# Patient Record
Sex: FEMALE | Race: WHITE | NOT HISPANIC OR LATINO | Employment: OTHER | URBAN - METROPOLITAN AREA
[De-identification: names, ages, dates, MRNs, and addresses within clinical notes are randomized per-mention and may not be internally consistent; named-entity substitution may affect disease eponyms.]

---

## 2017-01-25 ENCOUNTER — TRANSCRIBE ORDERS (OUTPATIENT)
Dept: ADMINISTRATIVE | Facility: HOSPITAL | Age: 82
End: 2017-01-25

## 2017-01-25 ENCOUNTER — ALLSCRIPTS OFFICE VISIT (OUTPATIENT)
Dept: OTHER | Facility: OTHER | Age: 82
End: 2017-01-25

## 2017-01-25 DIAGNOSIS — C50.311 MALIGNANT NEOPLASM OF LOWER-INNER QUADRANT OF RIGHT FEMALE BREAST (HCC): Primary | ICD-10-CM

## 2017-02-20 ENCOUNTER — GENERIC CONVERSION - ENCOUNTER (OUTPATIENT)
Dept: OTHER | Facility: OTHER | Age: 82
End: 2017-02-20

## 2017-07-11 ENCOUNTER — ALLSCRIPTS OFFICE VISIT (OUTPATIENT)
Dept: OTHER | Facility: OTHER | Age: 82
End: 2017-07-11

## 2017-08-15 ENCOUNTER — HOSPITAL ENCOUNTER (OUTPATIENT)
Dept: MAMMOGRAPHY | Facility: CLINIC | Age: 82
Discharge: HOME | End: 2017-08-15
Payer: COMMERCIAL

## 2017-08-15 DIAGNOSIS — C50.311 MALIGNANT NEOPLASM OF LOWER-INNER QUADRANT OF RIGHT FEMALE BREAST (HCC): ICD-10-CM

## 2017-08-15 PROCEDURE — G0279 TOMOSYNTHESIS, MAMMO: HCPCS

## 2017-08-15 PROCEDURE — G0204 DX MAMMO INCL CAD BI: HCPCS

## 2017-08-23 ENCOUNTER — ALLSCRIPTS OFFICE VISIT (OUTPATIENT)
Dept: OTHER | Facility: OTHER | Age: 82
End: 2017-08-23

## 2018-01-11 NOTE — RESULT NOTES
Verified Results  (1) COMPREHENSIVE METABOLIC PANEL [id number] 13:38KF Me[facility] Quant    Order Number: [id number]    TW Order Number: [id number] Order Number: [id number] Order Number: [id number]  National Kidney Disease Education Program recommendations are as follows:  GFR calculation is accurate only with a steady state creatinine  Chronic Kidney disease less than 60 ml/min/1 73 sq  meters  Kidney failure less than 15 ml/min/1 73 sq  meters  Test Name Result Flag Reference   GLUCOSE,RANDM 88 mg/dL     If the patient is fasting, the ADA then defines impaired fasting glucose as > 100 mg/dL and diabetes as > or equal to 123 mg/dL  SODIUM 138 mmol/L  136-145   POTASSIUM 4 2 mmol/L  3 5-5 3   CHLORIDE 102 mmol/L  100-108   CARBON DIOXIDE 28 mmol/L  21-32   ANION GAP (CALC) 8 mmol/L  4-13   BLOOD UREA NITROGEN 33 mg/dL H 5-25   CREATININE 1 26 mg/dL  0 60-1 30   Standardized to IDMS reference method   CALCIUM 9 1 mg/dL  8 3-10 1   BILI, TOTAL 0 47 mg/dL  0 20-1 00   ALK PHOSPHATAS 52 U/L     ALT (SGPT) 25 U/L  12-78   AST(SGOT) 20 U/L  5-45   ALBUMIN 3 9 g/dL  3 5-5 0   TOTAL PROTEIN 7 2 g/dL  6 4-8 2   eGFR Non-African American 40 8 ml/min/1 73sq m       (1) CBC/PLT/DIFF 22Apr2016 10:47AM [facility]   TW Order Number: [id number]    TW Order Number: [id number]     Test Name Result Flag Reference   WBC COUNT 5 18 Thousand/uL  4 31-10 16   RBC COUNT 3 58 Million/uL L 3 81-5 12   HEMOGLOBIN 11 5 g/dL  11 5-15 4   HEMATOCRIT 35 5 %  34 8-46  1   MCV 99 fL H 82-98   MCH 32 1 pg  26 8-34 3   MCHC 32 4 g/dL  31 4-37 4   RDW 14 6 %  11 6-15 1   MPV 9 4 fL  8 9-12 7   PLATELET COUNT 275 Thousands/uL  149-390   nRBC AUTOMATED 0 /100 WBCs     NEUTROPHILS RELATIVE PERCENT 57 %  43-75   LYMPHOCYTES RELATIVE PERCENT 30 %  14-44   MONOCYTES RELATIVE PERCENT 7 %  4-12   EOSINOPHILS RELATIVE PERCENT 5 %  0-6   BASOPHILS RELATIVE PERCENT 1 %  0-1   NEUTROPHILS ABSOLUTE COUNT 2 97 Thousands/µL  1 85-7 62 LYMPHOCYTES ABSOLUTE COUNT 1 54 Thousands/µL  0 60-4 47   MONOCYTES ABSOLUTE COUNT 0 35 Thousand/µL  0 17-1 22   EOSINOPHILS ABSOLUTE COUNT 0 24 Thousand/µL  0 00-0 61   BASOPHILS ABSOLUTE COUNT 0 07 Thousands/µL  0 00-0 10     (1) LIPID PANEL, FASTING 22Apr2016 10:47AM [facility] Order Number: [id number]     Order Number: [id number] Order Number: [id number] Order Number: [id number]  Triglyceride:         Normal              <150 mg/dl       Borderline High    150-199 mg/dl       High               200-499 mg/dl       Very High          >499 mg/dl  Cholesterol:         Desirable        <200 mg/dl      Borderline High  200-239 mg/dl      High             >239 mg/dl  HDL Cholesterol:        High    >59 mg/dL      Low     <41 mg/dL  LDL CALCULATED:    This screening LDL is a calculated result  It does not have the accuracy of the Direct Measured LDL in the monitoring of patients with hyperlipidemia and/or statin therapy  Direct Measure LDL (PQA518) must be ordered separately in these patients  Test Name Result Flag Reference   CHOLESTEROL 201 mg/dL H    HDL,DIRECT 54 mg/dL  40-60   Specimen collection should occur prior to Metamizole administration due to the potential for falsely depressed results  LDL CHOLESTEROL CALCULATED 99 mg/dL  0-100   TRIGLYCERIDES 239 mg/dL H <=150   Specimen collection should occur prior to N-Acetylcysteine or Metamizole administration due to the potential for falsely depressed results       (1) HEMOGLOBIN A1C 22Apr2016 10:47AM [facility] Order Number: [id number]      5 7-6 4% impaired fasting glucose  >=6 5% diagnosis of diabetes    Falsely low levels are seen in conditions linked to short RBC life span-  hemolytic anemia, and splenomegaly  Falsely elevated levels are seen in situations where there is an increased production of RBC- receipt of erythropoietin or blood transfusions  Adopted from ADA-Clinical Practice Recommendations     Test Name Result Flag Reference   HEMOGLOBIN A1C 5 6 %  4 0-5 6   EST  AVG  GLUCOSE 114 mg/dl       (1) TSH WITH FT4 REFLEX 22Apr2016 10:47AM Ashanti Aguirre    Order Number: [id number]    TW Order Number: [id number]     Test Name Result Flag Reference   TSH 7 200 uIU/mL H 0 358-3 740   T4,FREE 1 14 ng/dL  0 76-1 46       Plan  Hypothyroidism    · Levothyroxine Sodium 75 MCG Oral Tablet; Take 1 tablet daily   · (1) TSH WITH FT4 REFLEX; Status:Active;  Requested GUEVARA:[id number];

## 2018-01-12 VITALS
BODY MASS INDEX: 41.12 KG/M2 | HEART RATE: 64 BPM | DIASTOLIC BLOOD PRESSURE: 70 MMHG | HEIGHT: 59 IN | TEMPERATURE: 98.2 F | OXYGEN SATURATION: 96 % | RESPIRATION RATE: 18 BRPM | SYSTOLIC BLOOD PRESSURE: 104 MMHG | WEIGHT: 204 LBS

## 2018-01-14 VITALS
TEMPERATURE: 98.3 F | WEIGHT: 206 LBS | SYSTOLIC BLOOD PRESSURE: 100 MMHG | BODY MASS INDEX: 41.53 KG/M2 | RESPIRATION RATE: 18 BRPM | HEIGHT: 59 IN | HEART RATE: 70 BPM | DIASTOLIC BLOOD PRESSURE: 64 MMHG

## 2018-01-15 VITALS
TEMPERATURE: 97.6 F | HEART RATE: 70 BPM | SYSTOLIC BLOOD PRESSURE: 110 MMHG | DIASTOLIC BLOOD PRESSURE: 74 MMHG | RESPIRATION RATE: 16 BRPM | OXYGEN SATURATION: 89 % | BODY MASS INDEX: 41.93 KG/M2 | HEIGHT: 59 IN | WEIGHT: 208 LBS

## 2018-07-10 ENCOUNTER — TRANSCRIBE ORDERS (OUTPATIENT)
Dept: ADMINISTRATIVE | Facility: HOSPITAL | Age: 83
End: 2018-07-10

## 2018-07-10 ENCOUNTER — OFFICE VISIT (OUTPATIENT)
Dept: HEMATOLOGY ONCOLOGY | Facility: CLINIC | Age: 83
End: 2018-07-10
Payer: COMMERCIAL

## 2018-07-10 VITALS
SYSTOLIC BLOOD PRESSURE: 120 MMHG | HEIGHT: 59 IN | DIASTOLIC BLOOD PRESSURE: 62 MMHG | BODY MASS INDEX: 40.92 KG/M2 | HEART RATE: 64 BPM | TEMPERATURE: 100 F | WEIGHT: 203 LBS | RESPIRATION RATE: 20 BRPM | OXYGEN SATURATION: 90 %

## 2018-07-10 DIAGNOSIS — C50.911 BILATERAL MALIGNANT NEOPLASM OF BREAST IN FEMALE, ESTROGEN RECEPTOR POSITIVE, UNSPECIFIED SITE OF BREAST (HCC): Primary | ICD-10-CM

## 2018-07-10 DIAGNOSIS — C50.912 BILATERAL MALIGNANT NEOPLASM OF BREAST IN FEMALE, ESTROGEN RECEPTOR POSITIVE, UNSPECIFIED SITE OF BREAST (HCC): Primary | ICD-10-CM

## 2018-07-10 DIAGNOSIS — C50.912 BILATERAL MALIGNANT NEOPLASM OF BREAST IN FEMALE, UNSPECIFIED ESTROGEN RECEPTOR STATUS, UNSPECIFIED SITE OF BREAST (HCC): Primary | ICD-10-CM

## 2018-07-10 DIAGNOSIS — Z17.0 BILATERAL MALIGNANT NEOPLASM OF BREAST IN FEMALE, ESTROGEN RECEPTOR POSITIVE, UNSPECIFIED SITE OF BREAST (HCC): Primary | ICD-10-CM

## 2018-07-10 DIAGNOSIS — C50.911 BILATERAL MALIGNANT NEOPLASM OF BREAST IN FEMALE, UNSPECIFIED ESTROGEN RECEPTOR STATUS, UNSPECIFIED SITE OF BREAST (HCC): Primary | ICD-10-CM

## 2018-07-10 PROCEDURE — 99214 OFFICE O/P EST MOD 30 MIN: CPT | Performed by: INTERNAL MEDICINE

## 2018-07-10 RX ORDER — CLOPIDOGREL BISULFATE 75 MG/1
75 TABLET ORAL DAILY
COMMUNITY
Start: 2018-05-12 | End: 2019-05-03 | Stop reason: HOSPADM

## 2018-07-10 RX ORDER — LEVOTHYROXINE SODIUM 112 MCG
112 TABLET ORAL DAILY
COMMUNITY
Start: 2018-07-02 | End: 2019-04-02 | Stop reason: ALTCHOICE

## 2018-07-10 RX ORDER — GABAPENTIN 300 MG/1
300 CAPSULE ORAL
COMMUNITY
Start: 2018-05-04 | End: 2019-02-19

## 2018-07-10 RX ORDER — SERTRALINE HYDROCHLORIDE 25 MG/1
25 TABLET, FILM COATED ORAL
COMMUNITY
Start: 2018-05-12

## 2018-07-10 NOTE — PROGRESS NOTES
Hematology / Oncology Outpatient Follow Up Note    Anat Pugh 80 y o  female PSP:[birth year] HFY:[id number]         Date:  7/10/2018    Assessment / Plan:  A 80year old postmenopausal woman with bilateral stage IA breast cancer  She had grade 1, ER/UT positive, HER-2 negative right breast cancer, as well as grade 2, ER/UT positive, HER-2 3+ left breast cancer  She underwent bilateral lumpectomy, resulting in MALGORZATA  She was intolerant to anastrozole, as well as exemestane  Because of history of stroke, tamoxifen was not recommended  She is currently on observation  Clinically, she has no evidence recurrent disease  I recommended her to be followed by Dr Tana Russo  She may see me p r n  basis  She and her daughter are in agreement with my recommendations  Subjective:     HPI:  A 45-year-old postmenopausal woman, who has several other comorbidities, including aortic bovine valve replacement, hypertension, as well as severe rheumatoid, and osteoarthritis  She was recently found to have bilateral breasts abnormality, based on screening mammography  Left breast  Biopsy was positive for invasive ductal carcinoma , which was % positive, UT 65-70% positive, HER-2 3+ disease  She underwent bilateral lumpectomy in September 8, 2015  Right lumpectomy specimen showed a 7x4 mm of invasive mucinous carcinoma, grade 1  ER percent positive, UT 95% positive, HER-2 negative disease  Left lumpectomy specimen showed 9x8x5 mm of invasive ductal carcinoma, grade 2  This was ER/UT positive, HER-2 3+ disease  She presents today to discuss adjuvant treatment options  She has somewhat limited mobility, because of arthritis  She use a cane for ambulation  According to the cardiologist short, she has normal  Ejection fraction, based on the echocardiogram in 2014  She has no complaint of pain  She has mild exertional shortness of breath  She has diffuse joint pain   She also told me that she has history of osteoporosis, which is improved with injection treatment, given by rheumatologist  Her performance status is 1-2/4 on ECOG scale  She has no family history of breast or ovarian cancer  Interval History:  A 80year-old postmenopausal woman with bilateral stage IA breast cancer  She has ER/NE positive, HER-2 negative on the right, ER/NE positive, HER-2 3+ on the left  She underwent bilateral lumpectomies resulting in MALGORZATA  She has both subcentimeter tumor  We previously had extensive discussion regarding adjuvant treatment options, including no systemic therapy, aromatase inhibitor, AI with Herceptin  She chose anastrozole  She could not tolerate anastrozole, because of significant musculoskeletal symptoms  She has baseline rheumatoid arthritis for which she was previously on prednisone  Subsequently, she was on exemestane for 2 weeks in January 2016, resulting in same side effect with significant joint pain and stiffness  Then, she discontinued hormonal therapy  She presents today for annual follow-up  She has baseline ambulatory dysfunction, due to the arthritis  Otherwise, she has no new complaint  She denied hot flashes  She has no respiratory symptoms  She is going to have another mammography next months  Her performance status is 1/4 on the ECOG scale  Objective:     Primary Diagnosis:    Bilateral breast cancer, diagnosed in September 2015  1  right breast cancer, stage IA (pT1b, pN0, M0)  Invasive mucinous histology Grade 1, ER, NE positive, HER-2 negative disease  2  left breast cancer, stage IA (pT1b, pN0,M0) invasive ductal histology, grade 2, ER/NE positive, HER-2 3+ disease  Cancer Staging:  Cancer Staging  No matching staging information was found for the patient  Previous Hematologic/ Oncologic Treatment:     1  adjuvant hormonal therapy with anastrozole for 10 days in November 2015   2  Adjuvant hormonal therapy with exemestane, for 2 weeks in January 2016       Current Hematologic/ Oncologic Treatment:      Observation  Disease Status:     MALGORZATA status post bilateral lumpectomy  Test Results:    Pathology:    Right lumpectomy specimen showed 7x4 mm of invasive mucinous carcinoma, grade 1  No sentinel lymph node was taken  % positive, DE 95% positive, HER-2 negative disease  Stage IA(pT1b, pN0,M0)Left lumpectomy specimen showed 9x8x5 mm of invasive ductal carcinoma, grade 2  2 sentinel lymph node was negative for metastatic disease  % positive, DE 65-70% positive, HER-2 3+ disease  Stage IA(pT1b, pN0,M0)  Radiology:    Mammography in August 2017 was benign  BI-RADS 2  Laboratory:        Physical Exam:      General Appearance:    Alert, oriented        Eyes:    PERRL   Ears:    Normal external ear canals, both ears   Nose:   Nares normal, septum midline   Throat:   Mucosa moist  Pharynx without injection  Neck:   Supple       Lungs:     Clear to auscultation bilaterally   Chest Wall:    No tenderness or deformity    Heart:    Regular rate and rhythm       Abdomen:     Soft, non-tender, bowel sounds +, no organomegaly           Extremities:   Extremities no cyanosis or edema       Skin:   no rash or icterus  Lymph nodes:   Cervical, supraclavicular, and axillary nodes normal   Neurologic:   CNII-XII intact, normal strength, sensation and reflexes     Throughout          Breast exam:   lumpectomy scar in outer upper quadrant of her bilateral breast  No palpable abnormality in either remaining breast tissue              ROS: Review of Systems   Musculoskeletal:        Arthritis   All other systems reviewed and are negative  Imaging: No results found        Labs:   Lab Results   Component Value Date    WBC 5 42 09/23/2016    HGB 12 1 09/23/2016    HCT 36 1 09/23/2016     (H) 09/23/2016     09/23/2016     Lab Results   Component Value Date     09/25/2016    K 3 8 09/25/2016     09/25/2016    CO2 27 09/25/2016    ANIONGAP 9 09/25/2016    BUN 27 (H) 09/25/2016    CREATININE 1 34 (H) 09/25/2016    GLUCOSE 81 09/25/2016    CALCIUM 8 3 09/25/2016    AST 26 09/23/2016    ALT 22 09/23/2016    ALKPHOS 44 (L) 09/23/2016    PROT 7 2 09/23/2016    BILITOT 0 39 09/23/2016    EGFR 37 9 09/25/2016         Lab Results   Component Value Date    BXQKEJSJ18 1,118 (H) 09/23/2016         Current Medications: Reviewed  Allergies: Reviewed  PMH/FH/SH:  Reviewed      Vital Sign:    Body surface area is 1 86 meters squared      Wt Readings from Last 3 Encounters:   07/10/18 92 1 kg (203 lb)   08/23/17 94 3 kg (208 lb)   07/11/17 92 5 kg (204 lb)        Temp Readings from Last 3 Encounters:   07/10/18 100 °F (37 8 °C) (Tympanic)   08/23/17 97 6 °F (36 4 °C)   07/11/17 98 2 °F (36 8 °C)        BP Readings from Last 3 Encounters:   07/10/18 120/62   08/23/17 110/74   07/11/17 104/70         Pulse Readings from Last 3 Encounters:   07/10/18 64   08/23/17 70   07/11/17 64     @LASTSAO2(3)@

## 2018-07-30 NOTE — ASSESSMENT & PLAN NOTE
Diagnosis and Staging:   Left breast invasive carcinoma, grade 2 Stage IA (T1b, N0, M0) %, [place] 65%, HER-2/gemma 3+, margins negative by greater than 2 mm  Right Breast  invasive carcinoma 7 mm in size Stage IA (T1b, NX, M0) grade 1 % /AK 90% HER-2/gemma negative  Closest margin 0 4 mmdense breast   Treatment History:   October 2015: Left breast lumpectomy sentinel lymph node biopsy, right breast lumpectomy  Anastrozole 1 mg daily for 10 days-stopped due to arthalgias  Exemestane for two weeks, stopped due to arthralgias

## 2018-08-01 ENCOUNTER — OFFICE VISIT (OUTPATIENT)
Dept: SURGICAL ONCOLOGY | Facility: CLINIC | Age: 83
End: 2018-08-01
Payer: COMMERCIAL

## 2018-08-01 VITALS
WEIGHT: 201 LBS | HEIGHT: 59 IN | TEMPERATURE: 98 F | BODY MASS INDEX: 40.52 KG/M2 | SYSTOLIC BLOOD PRESSURE: 138 MMHG | DIASTOLIC BLOOD PRESSURE: 82 MMHG | RESPIRATION RATE: 16 BRPM | HEART RATE: 60 BPM

## 2018-08-01 DIAGNOSIS — C50.912 BILATERAL MALIGNANT NEOPLASM OF BREAST IN FEMALE, ESTROGEN RECEPTOR POSITIVE, UNSPECIFIED SITE OF BREAST (HCC): Primary | ICD-10-CM

## 2018-08-01 DIAGNOSIS — C50.911 BILATERAL MALIGNANT NEOPLASM OF BREAST IN FEMALE, ESTROGEN RECEPTOR POSITIVE, UNSPECIFIED SITE OF BREAST (HCC): Primary | ICD-10-CM

## 2018-08-01 DIAGNOSIS — Z17.0 BILATERAL MALIGNANT NEOPLASM OF BREAST IN FEMALE, ESTROGEN RECEPTOR POSITIVE, UNSPECIFIED SITE OF BREAST (HCC): Primary | ICD-10-CM

## 2018-08-01 PROCEDURE — 4040F PNEUMOC VAC/ADMIN/RCVD: CPT | Performed by: SURGERY

## 2018-08-01 PROCEDURE — 99214 OFFICE O/P EST MOD 30 MIN: CPT | Performed by: SURGERY

## 2018-08-01 RX ORDER — NYSTATIN 100000 [USP'U]/G
POWDER TOPICAL AS NEEDED
COMMUNITY
Start: 2018-01-19 | End: 2019-11-06 | Stop reason: HOSPADM

## 2018-08-01 RX ORDER — LORAZEPAM 0.5 MG/1
0.5 TABLET ORAL
COMMUNITY
Start: 2017-06-13 | End: 2019-04-18

## 2018-08-01 RX ORDER — LIDOCAINE 50 MG/G
2 PATCH TOPICAL DAILY
COMMUNITY
Start: 2018-05-13 | End: 2019-04-18

## 2018-08-01 NOTE — PROGRESS NOTES
Surgical Oncology Follow Up       [address] [place] [address],[address] [place] [address]  [facility] [place] [address] [address]  1934  [id number]  [address] [place] [address],[address] [place] [address]  [facility] [place] [id number]    Chief Complaint   Patient presents with    Follow-up     6  month follow up    Breast Cancer          Assessment & Plan:   The patient presents for six-month follow-up visit  She has no complaints referable to her breast   She is due for mammogram and has a scheduled for August 17th  Clinical exam shows no evidence of local regional or distant recurrence disease  We will see her back in 6 months  She is agreeable to seeing Aaron Johnsonu at that time  Cancer History:      No history exists  Diagnosis and Staging: Left breast invasive carcinoma, grade 2 Stage I (T1b, N0, M0) %, [place] 65%, HER-2/gemma 3+, margins negative by greater than 2 mmRight breast invasive carcinoma 7 mm in size Stage I (T1b, NX, M0) grade 1 ER/[place] HER-2/gemma pending  Closest margin 0 4 mmHeterogeneously dense breast   Treatment History: October 2015: Left breast lumpectomy sentinel lymph node biopsy, right breast lumpectomyArimidex discontinued secondary to arthralgias       Interval History:   See above    Review of Systems:   Review of Systems   Constitutional: Negative for activity change, appetite change and fatigue  HENT: Negative  Eyes: Negative  Respiratory: Negative for cough, shortness of breath and wheezing  Cardiovascular: Negative for chest pain and leg swelling  Gastrointestinal: Negative  Endocrine: Negative  Genitourinary: Negative  Musculoskeletal:        No new changes or complaints of bone pain   Skin: Negative  Allergic/Immunologic: Negative  Neurological: Negative  Hematological: Negative  Psychiatric/Behavioral: Negative          Past Medical History     Patient Active Problem List   Diagnosis    TIA (transient ischemic attack)    UTI (urinary tract infection)    Hypertension    Diabetes ([place] [address] )    Hypothyroidism    HX: breast cancer    H/O: CVA (cerebrovascular accident)    Osteoporosis    Arthritis    Fibromyalgia    Bilateral malignant neoplasm of breast in female, estrogen receptor positive ([place] [address] )     Past Medical History:   Diagnosis Date    Cardiac disease     aortic valve transplant    Diabetes mellitus ([place] [address] )     Disease of thyroid gland     Hyperlipidemia     Hypertension     Renal disorder     kidney stent    Stroke ([place] [address] )      No past surgical history on file  No family history on file  Social History     Social History    Marital status: /Civil Union     Spouse name: N/A    Number of children: N/A    Years of education: N/A     Occupational History    Not on file       Social History Main Topics    Smoking status: Never Smoker    Smokeless tobacco: Not on file    Alcohol use No    Drug use: No    Sexual activity: Not on file     Other Topics Concern    Not on file     Social History Narrative    No narrative on file       Current Outpatient Prescriptions:     amLODIPine (NORVASC) 5 mg tablet, Take by mouth , Disp: , Rfl:     clopidogrel (PLAVIX) 75 mg tablet, , Disp: , Rfl:     furosemide (LASIX) 20 mg tablet, Take by mouth , Disp: , Rfl:     gabapentin (NEURONTIN) 300 mg capsule, Take 300 mg by mouth, Disp: , Rfl:     HYDROcodone-acetaminophen (NORCO) 5-325 mg per tablet, Take by mouth , Disp: , Rfl:     hydroxychloroquine (PLAQUENIL) 200 mg tablet, Take by mouth , Disp: , Rfl:     losartan (COZAAR) 100 MG tablet, Take by mouth , Disp: , Rfl:     metoprolol tartrate (LOPRESSOR) 25 mg tablet, Take by mouth , Disp: , Rfl:     nitroglycerin (NITROSTAT) 0 4 mg SL tablet, Place under the tongue , Disp: , Rfl:     Omega-3 Fatty Acids (FISH OIL) 645 MG CAPS, Take by mouth , Disp: , Rfl:     potassium chloride (KLOR-CON M10) 10 mEq tablet, Take by mouth , Disp: , Rfl:     Red Yeast Rice 600 MG CAPS, Take by mouth , Disp: , Rfl:     sertraline (ZOLOFT) 25 mg tablet, , Disp: , Rfl:     SYNTHROID 112 MCG tablet, , Disp: , Rfl:   Allergies   Allergen Reactions    Duloxetine Hcl Other (See Comments)    Escitalopram      Other reaction(s): Urinary Retention    Gabapentin      Other reaction(s): Hypertension    Pregabalin      Other reaction(s): Hypertension    Tramadol      Other reaction(s): Hypertension    Triprolidine-Pse Other (See Comments)       Physical Exam:   There were no vitals filed for this visit  Physical Exam   Constitutional: She is oriented to person, place, and time  She appears well-developed and well-nourished  HENT:   Head: Normocephalic and atraumatic  Mouth/Throat: Oropharynx is clear and moist    Eyes: EOM are normal  Pupils are equal, round, and reactive to light  Neck: Normal range of motion  Neck supple  No JVD present  No tracheal deviation present  No thyromegaly present  Cardiovascular: Normal rate, regular rhythm, normal heart sounds and intact distal pulses  Exam reveals no gallop and no friction rub  No murmur heard  Pulmonary/Chest: Effort normal and breath sounds normal  No respiratory distress  She has no wheezes  She has no rales  Both breasts were examined in the sitting and supine position  There are no worrisome skin lesions, no nipple retraction and no nipple discharge  There are no dominant masses, axillary adenopathy or supraclavicular adenopathy on either side  Abdominal: Soft  She exhibits no distension and no mass  There is no hepatomegaly  There is no tenderness  There is no rebound and no guarding  Musculoskeletal: Normal range of motion  She exhibits no edema or tenderness  Lymphadenopathy:     She has no cervical adenopathy  Neurological: She is alert and oriented to person, place, and time  No cranial nerve deficit  Skin: Skin is warm and dry  No rash noted   No erythema  Psychiatric: She has a normal mood and affect  Her behavior is normal    Vitals reviewed  Results:   Not applicable          Advance Care Planning/Advance Directives:  I discussed the disease status, treatment plans and follow-up with the patient

## 2018-08-01 NOTE — LETTER
August 1, 2018     Jay Jin, DO  [address]    Patient: Silvestre Osborne   YOB: 1934   Date of Visit: 8/1/2018       Dear Dr Trisha Doss: Thank you for referring Claudia Ramos to me for evaluation  Below are my notes for this consultation  If you have questions, please do not hesitate to call me  I look forward to following your patient along with you  Sincerely,        Olya Laura MD        CC: No Recipients  Olya Laura MD  8/1/2018  2:11 PM  Sign at close encounter     Surgical Oncology Follow Up       [address] [place] [address] [address]  1934  [phone]  [address] [facility],[address]  [facility] [place] [id number]    Chief Complaint   Patient presents with    Follow-up     6  month follow up    Breast Cancer          Assessment & Plan:   The patient presents for six-month follow-up visit  She has no complaints referable to her breast   She is due for mammogram and has a scheduled for August 17th  Clinical exam shows no evidence of local regional or distant recurrence disease  We will see her back in 6 months  She is agreeable to seeing Solange Mcintosh at that time  Cancer History:      No history exists  Diagnosis and Staging: Left breast invasive carcinoma, grade 2 Stage I (T1b, N0, M0) %, NH 65%, HER-2/gemma 3+, margins negative by greater than 2 mmRight breast invasive carcinoma 7 mm in size Stage I (T1b, NX, M0) grade 1 ER/NH HER-2/gemma pending  Closest margin 0 4 mmHeterogeneously dense breast   Treatment History: October 2015: Left breast lumpectomy sentinel lymph node biopsy, right breast lumpectomyArimidex discontinued secondary to arthralgias       Interval History:   See above    Review of Systems:   Review of Systems   Constitutional: Negative for activity change, appetite change and fatigue  HENT: Negative  Eyes: Negative  Respiratory: Negative for cough, shortness of breath and wheezing  Cardiovascular: Negative for chest pain and leg swelling  Gastrointestinal: Negative  Endocrine: Negative  Genitourinary: Negative  Musculoskeletal:        No new changes or complaints of bone pain   Skin: Negative  Allergic/Immunologic: Negative  Neurological: Negative  Hematological: Negative  Psychiatric/Behavioral: Negative  Past Medical History     Patient Active Problem List   Diagnosis    TIA (transient ischemic attack)    UTI (urinary tract infection)    Hypertension    Diabetes ([place] [address] )    Hypothyroidism    HX: breast cancer    H/O: CVA (cerebrovascular accident)    Osteoporosis    Arthritis    Fibromyalgia    Bilateral malignant neoplasm of breast in female, estrogen receptor positive ([place] [address] )     Past Medical History:   Diagnosis Date    Cardiac disease     aortic valve transplant    Diabetes mellitus ([place] [address] )     Disease of thyroid gland     Hyperlipidemia     Hypertension     Renal disorder     kidney stent    Stroke ([place] [address] )      No past surgical history on file  No family history on file  Social History     Social History    Marital status: /Civil Union     Spouse name: N/A    Number of children: N/A    Years of education: N/A     Occupational History    Not on file       Social History Main Topics    Smoking status: Never Smoker    Smokeless tobacco: Not on file    Alcohol use No    Drug use: No    Sexual activity: Not on file     Other Topics Concern    Not on file     Social History Narrative    No narrative on file       Current Outpatient Prescriptions:     amLODIPine (NORVASC) 5 mg tablet, Take by mouth , Disp: , Rfl:     clopidogrel (PLAVIX) 75 mg tablet, , Disp: , Rfl:     furosemide (LASIX) 20 mg tablet, Take by mouth , Disp: , Rfl:     gabapentin (NEURONTIN) 300 mg capsule, Take 300 mg by mouth, Disp: , Rfl:    HYDROcodone-acetaminophen (NORCO) 5-325 mg per tablet, Take by mouth , Disp: , Rfl:     hydroxychloroquine (PLAQUENIL) 200 mg tablet, Take by mouth , Disp: , Rfl:     losartan (COZAAR) 100 MG tablet, Take by mouth , Disp: , Rfl:     metoprolol tartrate (LOPRESSOR) 25 mg tablet, Take by mouth , Disp: , Rfl:     nitroglycerin (NITROSTAT) 0 4 mg SL tablet, Place under the tongue , Disp: , Rfl:     Omega-3 Fatty Acids (FISH OIL) 645 MG CAPS, Take by mouth , Disp: , Rfl:     potassium chloride (KLOR-CON M10) 10 mEq tablet, Take by mouth , Disp: , Rfl:     Red Yeast Rice 600 MG CAPS, Take by mouth , Disp: , Rfl:     sertraline (ZOLOFT) 25 mg tablet, , Disp: , Rfl:     SYNTHROID 112 MCG tablet, , Disp: , Rfl:   Allergies   Allergen Reactions    Duloxetine Hcl Other (See Comments)    Escitalopram      Other reaction(s): Urinary Retention    Gabapentin      Other reaction(s): Hypertension    Pregabalin      Other reaction(s): Hypertension    Tramadol      Other reaction(s): Hypertension    Triprolidine-Pse Other (See Comments)       Physical Exam:   There were no vitals filed for this visit  Physical Exam   Constitutional: She is oriented to person, place, and time  She appears well-developed and well-nourished  HENT:   Head: Normocephalic and atraumatic  Mouth/Throat: Oropharynx is clear and moist    Eyes: EOM are normal  Pupils are equal, round, and reactive to light  Neck: Normal range of motion  Neck supple  No JVD present  No tracheal deviation present  No thyromegaly present  Cardiovascular: Normal rate, regular rhythm, normal heart sounds and intact distal pulses  Exam reveals no gallop and no friction rub  No murmur heard  Pulmonary/Chest: Effort normal and breath sounds normal  No respiratory distress  She has no wheezes  She has no rales  Both breasts were examined in the sitting and supine position  There are no worrisome skin lesions, no nipple retraction and no nipple discharge  There are no dominant masses, axillary adenopathy or supraclavicular adenopathy on either side  Abdominal: Soft  She exhibits no distension and no mass  There is no hepatomegaly  There is no tenderness  There is no rebound and no guarding  Musculoskeletal: Normal range of motion  She exhibits no edema or tenderness  Lymphadenopathy:     She has no cervical adenopathy  Neurological: She is alert and oriented to person, place, and time  No cranial nerve deficit  Skin: Skin is warm and dry  No rash noted  No erythema  Psychiatric: She has a normal mood and affect  Her behavior is normal    Vitals reviewed  Results:   Not applicable          Advance Care Planning/Advance Directives:  I discussed the disease status, treatment plans and follow-up with the patient

## 2018-08-24 ENCOUNTER — HOSPITAL ENCOUNTER (OUTPATIENT)
Dept: MAMMOGRAPHY | Facility: CLINIC | Age: 83
Discharge: HOME | End: 2018-08-24
Payer: COMMERCIAL

## 2018-08-24 DIAGNOSIS — C50.911 BILATERAL MALIGNANT NEOPLASM OF BREAST IN FEMALE, UNSPECIFIED ESTROGEN RECEPTOR STATUS, UNSPECIFIED SITE OF BREAST (HCC): ICD-10-CM

## 2018-08-24 DIAGNOSIS — C50.912 BILATERAL MALIGNANT NEOPLASM OF BREAST IN FEMALE, UNSPECIFIED ESTROGEN RECEPTOR STATUS, UNSPECIFIED SITE OF BREAST (HCC): ICD-10-CM

## 2018-08-24 PROCEDURE — 77066 DX MAMMO INCL CAD BI: CPT

## 2018-08-24 PROCEDURE — G0279 TOMOSYNTHESIS, MAMMO: HCPCS

## 2018-08-27 ENCOUNTER — TELEPHONE (OUTPATIENT)
Dept: HEMATOLOGY ONCOLOGY | Facility: CLINIC | Age: 83
End: 2018-08-27

## 2018-08-27 NOTE — TELEPHONE ENCOUNTER
----- Message from Monika Vasques RN sent at 8/24/2018  4:41 PM EDT -----      ----- Message -----  From: Fahad Salas MD  Sent: 8/24/2018   3:16 PM  To: Monika Vasques RN    Call pt  Normal mammo

## 2019-01-09 ENCOUNTER — APPOINTMENT (EMERGENCY)
Dept: RADIOLOGY | Facility: HOSPITAL | Age: 84
DRG: 552 | End: 2019-01-09
Payer: COMMERCIAL

## 2019-01-09 ENCOUNTER — HOSPITAL ENCOUNTER (INPATIENT)
Facility: HOSPITAL | Age: 84
LOS: 8 days | DRG: 552 | End: 2019-01-17
Attending: SURGERY | Admitting: SURGERY
Payer: COMMERCIAL

## 2019-01-09 DIAGNOSIS — S12.101A CLOSED NONDISPLACED FRACTURE OF SECOND CERVICAL VERTEBRA (HCC): Primary | ICD-10-CM

## 2019-01-09 DIAGNOSIS — M54.2 NECK PAIN, ACUTE: ICD-10-CM

## 2019-01-09 DIAGNOSIS — S12.120A CLOSED ODONTOID FRACTURE WITH TYPE III MORPHOLOGY, INITIAL ENCOUNTER (HCC): ICD-10-CM

## 2019-01-09 PROBLEM — S12.500A C6 CERVICAL FRACTURE (HCC): Status: ACTIVE | Noted: 2019-01-09

## 2019-01-09 PROBLEM — E03.9 HYPOTHYROID: Status: ACTIVE | Noted: 2019-01-09

## 2019-01-09 PROBLEM — I10 HYPERTENSION: Status: ACTIVE | Noted: 2019-01-09

## 2019-01-09 LAB
ALBUMIN SERPL BCP-MCNC: 3.9 G/DL (ref 3.5–5)
ALP SERPL-CCNC: 70 U/L (ref 46–116)
ALT SERPL W P-5'-P-CCNC: 38 U/L (ref 12–78)
ANION GAP SERPL CALCULATED.3IONS-SCNC: 8 MMOL/L (ref 4–13)
AST SERPL W P-5'-P-CCNC: 36 U/L (ref 5–45)
BASE EXCESS BLDA CALC-SCNC: 3 MMOL/L (ref -2–3)
BASOPHILS # BLD AUTO: 0.06 THOUSANDS/ΜL (ref 0–0.1)
BASOPHILS NFR BLD AUTO: 1 % (ref 0–1)
BILIRUB SERPL-MCNC: 0.42 MG/DL (ref 0.2–1)
BUN SERPL-MCNC: 25 MG/DL (ref 5–25)
CA-I BLD-SCNC: 1.13 MMOL/L (ref 1.12–1.32)
CALCIUM SERPL-MCNC: 9 MG/DL (ref 8.3–10.1)
CHLORIDE SERPL-SCNC: 102 MMOL/L (ref 100–108)
CO2 SERPL-SCNC: 27 MMOL/L (ref 21–32)
CREAT SERPL-MCNC: 1.12 MG/DL (ref 0.6–1.3)
EOSINOPHIL # BLD AUTO: 0.06 THOUSAND/ΜL (ref 0–0.61)
EOSINOPHIL NFR BLD AUTO: 1 % (ref 0–6)
ERYTHROCYTE [DISTWIDTH] IN BLOOD BY AUTOMATED COUNT: 13.2 % (ref 11.6–15.1)
GFR SERPL CREATININE-BSD FRML MDRD: 45 ML/MIN/1.73SQ M
GLUCOSE SERPL-MCNC: 161 MG/DL (ref 65–140)
GLUCOSE SERPL-MCNC: 178 MG/DL (ref 65–140)
HCO3 BLDA-SCNC: 29.7 MMOL/L (ref 24–30)
HCT VFR BLD AUTO: 37.5 % (ref 34.8–46.1)
HCT VFR BLD CALC: 36 % (ref 34.8–46.1)
HGB BLD-MCNC: 11.8 G/DL (ref 11.5–15.4)
HGB BLDA-MCNC: 12.2 G/DL (ref 11.5–15.4)
IMM GRANULOCYTES # BLD AUTO: 0.14 THOUSAND/UL (ref 0–0.2)
IMM GRANULOCYTES NFR BLD AUTO: 2 % (ref 0–2)
LYMPHOCYTES # BLD AUTO: 2.42 THOUSANDS/ΜL (ref 0.6–4.47)
LYMPHOCYTES NFR BLD AUTO: 27 % (ref 14–44)
MCH RBC QN AUTO: 31.9 PG (ref 26.8–34.3)
MCHC RBC AUTO-ENTMCNC: 31.5 G/DL (ref 31.4–37.4)
MCV RBC AUTO: 101 FL (ref 82–98)
MONOCYTES # BLD AUTO: 0.5 THOUSAND/ΜL (ref 0.17–1.22)
MONOCYTES NFR BLD AUTO: 6 % (ref 4–12)
NEUTROPHILS # BLD AUTO: 5.66 THOUSANDS/ΜL (ref 1.85–7.62)
NEUTS SEG NFR BLD AUTO: 63 % (ref 43–75)
NRBC BLD AUTO-RTO: 0 /100 WBCS
PCO2 BLD: 31 MMOL/L (ref 21–32)
PCO2 BLD: 52.4 MM HG (ref 42–50)
PH BLD: 7.36 [PH] (ref 7.3–7.4)
PLATELET # BLD AUTO: 183 THOUSANDS/UL (ref 149–390)
PMV BLD AUTO: 9.4 FL (ref 8.9–12.7)
PO2 BLD: 30 MM HG (ref 35–45)
POTASSIUM BLD-SCNC: 4.6 MMOL/L (ref 3.5–5.3)
POTASSIUM SERPL-SCNC: 4.4 MMOL/L (ref 3.5–5.3)
PROT SERPL-MCNC: 7.7 G/DL (ref 6.4–8.2)
RBC # BLD AUTO: 3.7 MILLION/UL (ref 3.81–5.12)
SAO2 % BLD FROM PO2: 53 % (ref 95–98)
SODIUM BLD-SCNC: 139 MMOL/L (ref 136–145)
SODIUM SERPL-SCNC: 137 MMOL/L (ref 136–145)
SPECIMEN SOURCE: ABNORMAL
WBC # BLD AUTO: 8.84 THOUSAND/UL (ref 4.31–10.16)

## 2019-01-09 PROCEDURE — 84295 ASSAY OF SERUM SODIUM: CPT

## 2019-01-09 PROCEDURE — 70450 CT HEAD/BRAIN W/O DYE: CPT

## 2019-01-09 PROCEDURE — 99232 SBSQ HOSP IP/OBS MODERATE 35: CPT | Performed by: SURGERY

## 2019-01-09 PROCEDURE — 80053 COMPREHEN METABOLIC PANEL: CPT | Performed by: SURGERY

## 2019-01-09 PROCEDURE — 82803 BLOOD GASES ANY COMBINATION: CPT

## 2019-01-09 PROCEDURE — 70498 CT ANGIOGRAPHY NECK: CPT

## 2019-01-09 PROCEDURE — 99285 EMERGENCY DEPT VISIT HI MDM: CPT

## 2019-01-09 PROCEDURE — 94760 N-INVAS EAR/PLS OXIMETRY 1: CPT

## 2019-01-09 PROCEDURE — 82947 ASSAY GLUCOSE BLOOD QUANT: CPT

## 2019-01-09 PROCEDURE — 96374 THER/PROPH/DIAG INJ IV PUSH: CPT

## 2019-01-09 PROCEDURE — 85014 HEMATOCRIT: CPT

## 2019-01-09 PROCEDURE — 36415 COLL VENOUS BLD VENIPUNCTURE: CPT | Performed by: SURGERY

## 2019-01-09 PROCEDURE — 82330 ASSAY OF CALCIUM: CPT

## 2019-01-09 PROCEDURE — 72125 CT NECK SPINE W/O DYE: CPT

## 2019-01-09 PROCEDURE — 84132 ASSAY OF SERUM POTASSIUM: CPT

## 2019-01-09 PROCEDURE — 85025 COMPLETE CBC W/AUTO DIFF WBC: CPT | Performed by: SURGERY

## 2019-01-09 RX ORDER — POTASSIUM CHLORIDE 750 MG/1
10 TABLET, EXTENDED RELEASE ORAL 2 TIMES DAILY
COMMUNITY
End: 2019-02-06 | Stop reason: HOSPADM

## 2019-01-09 RX ORDER — ONDANSETRON 2 MG/ML
INJECTION INTRAMUSCULAR; INTRAVENOUS
Status: COMPLETED
Start: 2019-01-09 | End: 2019-01-09

## 2019-01-09 RX ORDER — ONDANSETRON 2 MG/ML
4 INJECTION INTRAMUSCULAR; INTRAVENOUS EVERY 4 HOURS PRN
Status: DISCONTINUED | OUTPATIENT
Start: 2019-01-09 | End: 2019-01-17 | Stop reason: HOSPADM

## 2019-01-09 RX ORDER — ALBUTEROL SULFATE 2.5 MG/3ML
2.5 SOLUTION RESPIRATORY (INHALATION) EVERY 6 HOURS PRN
Status: DISCONTINUED | OUTPATIENT
Start: 2019-01-09 | End: 2019-01-12

## 2019-01-09 RX ORDER — HYDROMORPHONE HCL/PF 1 MG/ML
0.5 SYRINGE (ML) INJECTION EVERY 4 HOURS PRN
Status: DISCONTINUED | OUTPATIENT
Start: 2019-01-09 | End: 2019-01-10

## 2019-01-09 RX ORDER — NYSTATIN 100000 U/G
CREAM TOPICAL 2 TIMES DAILY
Status: DISCONTINUED | OUTPATIENT
Start: 2019-01-09 | End: 2019-01-17 | Stop reason: HOSPADM

## 2019-01-09 RX ORDER — FUROSEMIDE 20 MG/1
20 TABLET ORAL DAILY
COMMUNITY
End: 2019-02-19

## 2019-01-09 RX ORDER — GABAPENTIN 300 MG/1
300 CAPSULE ORAL 2 TIMES DAILY
Status: ON HOLD | COMMUNITY
End: 2019-02-05

## 2019-01-09 RX ORDER — CLOPIDOGREL BISULFATE 75 MG/1
75 TABLET ORAL DAILY
COMMUNITY
End: 2019-02-19

## 2019-01-09 RX ORDER — LEVOTHYROXINE SODIUM 112 UG/1
CAPSULE ORAL DAILY
Status: ON HOLD | COMMUNITY
End: 2019-10-08

## 2019-01-09 RX ORDER — LOSARTAN POTASSIUM 100 MG/1
100 TABLET ORAL DAILY
Status: ON HOLD | COMMUNITY
End: 2019-02-05

## 2019-01-09 RX ORDER — OXYCODONE HYDROCHLORIDE 5 MG/1
2.5 TABLET ORAL EVERY 4 HOURS PRN
Status: DISCONTINUED | OUTPATIENT
Start: 2019-01-09 | End: 2019-01-11

## 2019-01-09 RX ORDER — AMPICILLIN TRIHYDRATE 250 MG
CAPSULE ORAL DAILY
COMMUNITY
End: 2019-03-15

## 2019-01-09 RX ORDER — ACETAMINOPHEN 325 MG/1
650 TABLET ORAL EVERY 6 HOURS SCHEDULED
Status: DISCONTINUED | OUTPATIENT
Start: 2019-01-10 | End: 2019-01-10

## 2019-01-09 RX ORDER — LOSARTAN POTASSIUM 50 MG/1
100 TABLET ORAL DAILY
Status: DISCONTINUED | OUTPATIENT
Start: 2019-01-10 | End: 2019-01-17 | Stop reason: HOSPADM

## 2019-01-09 RX ORDER — LORAZEPAM 0.5 MG/1
0.5 TABLET ORAL EVERY 6 HOURS PRN
Status: DISCONTINUED | OUTPATIENT
Start: 2019-01-09 | End: 2019-01-10

## 2019-01-09 RX ORDER — LIDOCAINE 50 MG/G
2 PATCH TOPICAL DAILY
COMMUNITY
End: 2019-11-06 | Stop reason: HOSPADM

## 2019-01-09 RX ORDER — HYDROXYCHLOROQUINE SULFATE 200 MG/1
200 TABLET, FILM COATED ORAL
COMMUNITY
End: 2019-02-19

## 2019-01-09 RX ORDER — CLOTRIMAZOLE 1 %
CREAM (GRAM) TOPICAL 2 TIMES DAILY
Status: DISCONTINUED | OUTPATIENT
Start: 2019-01-09 | End: 2019-01-09

## 2019-01-09 RX ORDER — AMLODIPINE BESYLATE 5 MG/1
5 TABLET ORAL DAILY
COMMUNITY
End: 2019-02-19

## 2019-01-09 RX ORDER — B-COMPLEX WITH VITAMIN C
1 TABLET ORAL 2 TIMES DAILY WITH MEALS
COMMUNITY
End: 2019-11-06 | Stop reason: HOSPADM

## 2019-01-09 RX ORDER — HYDROCODONE BITARTRATE AND ACETAMINOPHEN 5; 300 MG/1; MG/1
1 TABLET ORAL EVERY 6 HOURS PRN
COMMUNITY
End: 2019-04-18

## 2019-01-09 RX ORDER — LIDOCAINE 50 MG/G
1 PATCH TOPICAL ONCE
Status: DISCONTINUED | OUTPATIENT
Start: 2019-01-09 | End: 2019-01-10

## 2019-01-09 RX ORDER — CHLORAL HYDRATE 500 MG
1000 CAPSULE ORAL DAILY
COMMUNITY
End: 2019-05-03 | Stop reason: HOSPADM

## 2019-01-09 RX ORDER — ALBUTEROL SULFATE 90 UG/1
2 AEROSOL, METERED RESPIRATORY (INHALATION) EVERY 4 HOURS PRN
Status: DISCONTINUED | OUTPATIENT
Start: 2019-01-09 | End: 2019-01-17 | Stop reason: HOSPADM

## 2019-01-09 RX ORDER — UBIDECARENONE 75 MG
CAPSULE ORAL DAILY
Status: ON HOLD | COMMUNITY
End: 2019-01-10

## 2019-01-09 RX ORDER — GABAPENTIN 100 MG/1
100 CAPSULE ORAL
Status: DISCONTINUED | OUTPATIENT
Start: 2019-01-09 | End: 2019-01-10

## 2019-01-09 RX ORDER — CHLORHEXIDINE GLUCONATE 0.12 MG/ML
15 RINSE ORAL EVERY 12 HOURS SCHEDULED
Status: DISCONTINUED | OUTPATIENT
Start: 2019-01-09 | End: 2019-01-10

## 2019-01-09 RX ORDER — LOSARTAN POTASSIUM 50 MG/1
100 TABLET ORAL DAILY
Status: DISCONTINUED | OUTPATIENT
Start: 2019-01-10 | End: 2019-01-09

## 2019-01-09 RX ORDER — LORAZEPAM 0.5 MG/1
0.5 TABLET ORAL 2 TIMES DAILY PRN
COMMUNITY
End: 2019-02-06 | Stop reason: HOSPADM

## 2019-01-09 RX ORDER — HEPARIN SODIUM 5000 [USP'U]/ML
5000 INJECTION, SOLUTION INTRAVENOUS; SUBCUTANEOUS EVERY 8 HOURS SCHEDULED
Status: DISCONTINUED | OUTPATIENT
Start: 2019-01-09 | End: 2019-01-09

## 2019-01-09 RX ORDER — OXYCODONE HYDROCHLORIDE 5 MG/1
5 TABLET ORAL EVERY 4 HOURS PRN
Status: DISCONTINUED | OUTPATIENT
Start: 2019-01-09 | End: 2019-01-11

## 2019-01-09 RX ORDER — FUROSEMIDE 20 MG/1
20 TABLET ORAL DAILY
Status: DISCONTINUED | OUTPATIENT
Start: 2019-01-10 | End: 2019-01-10

## 2019-01-09 RX ORDER — SODIUM CHLORIDE 9 MG/ML
75 INJECTION, SOLUTION INTRAVENOUS CONTINUOUS
Status: DISCONTINUED | OUTPATIENT
Start: 2019-01-09 | End: 2019-01-10

## 2019-01-09 RX ORDER — LEVOTHYROXINE SODIUM 112 UG/1
112 TABLET ORAL
Status: DISCONTINUED | OUTPATIENT
Start: 2019-01-10 | End: 2019-01-17 | Stop reason: HOSPADM

## 2019-01-09 RX ORDER — NITROGLYCERIN 0.4 MG/1
0.4 TABLET SUBLINGUAL
COMMUNITY
End: 2019-03-15

## 2019-01-09 RX ORDER — LORAZEPAM 0.5 MG/1
0.5 TABLET ORAL 2 TIMES DAILY
Status: DISCONTINUED | OUTPATIENT
Start: 2019-01-10 | End: 2019-01-09

## 2019-01-09 RX ADMIN — HYDROMORPHONE HYDROCHLORIDE 0.5 MG: 1 INJECTION, SOLUTION INTRAMUSCULAR; INTRAVENOUS; SUBCUTANEOUS at 22:04

## 2019-01-09 RX ADMIN — SODIUM CHLORIDE 100 ML/HR: 0.9 INJECTION, SOLUTION INTRAVENOUS at 23:13

## 2019-01-09 RX ADMIN — ONDANSETRON 4 MG: 2 INJECTION INTRAMUSCULAR; INTRAVENOUS at 23:35

## 2019-01-09 RX ADMIN — CHLORHEXIDINE GLUCONATE 0.12% ORAL RINSE 15 ML: 1.2 LIQUID ORAL at 23:08

## 2019-01-09 RX ADMIN — METOPROLOL TARTRATE 25 MG: 25 TABLET, FILM COATED ORAL at 23:08

## 2019-01-09 RX ADMIN — ACETAMINOPHEN 650 MG: 325 TABLET, FILM COATED ORAL at 23:08

## 2019-01-09 RX ADMIN — GABAPENTIN 100 MG: 100 CAPSULE ORAL at 23:08

## 2019-01-09 RX ADMIN — IOHEXOL 85 ML: 350 INJECTION, SOLUTION INTRAVENOUS at 20:56

## 2019-01-10 ENCOUNTER — APPOINTMENT (INPATIENT)
Dept: RADIOLOGY | Facility: HOSPITAL | Age: 84
DRG: 552 | End: 2019-01-10
Payer: COMMERCIAL

## 2019-01-10 PROBLEM — R53.81 PHYSICAL DECONDITIONING: Status: ACTIVE | Noted: 2019-01-10
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
## 2019-01-10 PROBLEM — R52 ACUTE PAIN: Status: ACTIVE | Noted: 2019-01-10

## 2019-01-10 PROBLEM — R68.89 FORGETFULNESS: Status: ACTIVE | Noted: 2019-01-10

## 2019-01-10 PROBLEM — R26.2 AMBULATORY DYSFUNCTION: Status: ACTIVE | Noted: 2019-01-10

## 2019-01-10 LAB
ABO GROUP BLD: NORMAL
ANION GAP SERPL CALCULATED.3IONS-SCNC: 10 MMOL/L (ref 4–13)
BASOPHILS # BLD AUTO: 0.03 THOUSANDS/ΜL (ref 0–0.1)
BASOPHILS NFR BLD AUTO: 0 % (ref 0–1)
BLD GP AB SCN SERPL QL: NEGATIVE
BUN SERPL-MCNC: 22 MG/DL (ref 5–25)
CALCIUM SERPL-MCNC: 8.8 MG/DL (ref 8.3–10.1)
CHLORIDE SERPL-SCNC: 104 MMOL/L (ref 100–108)
CO2 SERPL-SCNC: 24 MMOL/L (ref 21–32)
CREAT SERPL-MCNC: 1 MG/DL (ref 0.6–1.3)
EOSINOPHIL # BLD AUTO: 0.01 THOUSAND/ΜL (ref 0–0.61)
EOSINOPHIL NFR BLD AUTO: 0 % (ref 0–6)
ERYTHROCYTE [DISTWIDTH] IN BLOOD BY AUTOMATED COUNT: 13.2 % (ref 11.6–15.1)
GFR SERPL CREATININE-BSD FRML MDRD: 52 ML/MIN/1.73SQ M
GLUCOSE SERPL-MCNC: 135 MG/DL (ref 65–140)
HCT VFR BLD AUTO: 34.9 % (ref 34.8–46.1)
HGB BLD-MCNC: 10.9 G/DL (ref 11.5–15.4)
IMM GRANULOCYTES # BLD AUTO: 0.06 THOUSAND/UL (ref 0–0.2)
IMM GRANULOCYTES NFR BLD AUTO: 1 % (ref 0–2)
INR PPP: 1.02 (ref 0.86–1.17)
LYMPHOCYTES # BLD AUTO: 1.44 THOUSANDS/ΜL (ref 0.6–4.47)
LYMPHOCYTES NFR BLD AUTO: 15 % (ref 14–44)
MCH RBC QN AUTO: 31.8 PG (ref 26.8–34.3)
MCHC RBC AUTO-ENTMCNC: 31.2 G/DL (ref 31.4–37.4)
MCV RBC AUTO: 102 FL (ref 82–98)
MONOCYTES # BLD AUTO: 0.54 THOUSAND/ΜL (ref 0.17–1.22)
MONOCYTES NFR BLD AUTO: 6 % (ref 4–12)
NEUTROPHILS # BLD AUTO: 7.28 THOUSANDS/ΜL (ref 1.85–7.62)
NEUTS SEG NFR BLD AUTO: 78 % (ref 43–75)
NRBC BLD AUTO-RTO: 0 /100 WBCS
NT-PROBNP SERPL-MCNC: 7347 PG/ML
PLATELET # BLD AUTO: 156 THOUSANDS/UL (ref 149–390)
PMV BLD AUTO: 9.2 FL (ref 8.9–12.7)
POTASSIUM SERPL-SCNC: 4.7 MMOL/L (ref 3.5–5.3)
PROTHROMBIN TIME: 13.5 SECONDS (ref 11.8–14.2)
RBC # BLD AUTO: 3.43 MILLION/UL (ref 3.81–5.12)
RH BLD: NEGATIVE
SODIUM SERPL-SCNC: 138 MMOL/L (ref 136–145)
SPECIMEN EXPIRATION DATE: NORMAL
WBC # BLD AUTO: 9.36 THOUSAND/UL (ref 4.31–10.16)

## 2019-01-10 PROCEDURE — 99254 IP/OBS CNSLTJ NEW/EST MOD 60: CPT | Performed by: PHYSICIAN ASSISTANT

## 2019-01-10 PROCEDURE — 85025 COMPLETE CBC W/AUTO DIFF WBC: CPT | Performed by: EMERGENCY MEDICINE

## 2019-01-10 PROCEDURE — 99232 SBSQ HOSP IP/OBS MODERATE 35: CPT | Performed by: SURGERY

## 2019-01-10 PROCEDURE — 97116 GAIT TRAINING THERAPY: CPT

## 2019-01-10 PROCEDURE — 85610 PROTHROMBIN TIME: CPT | Performed by: EMERGENCY MEDICINE

## 2019-01-10 PROCEDURE — 83880 ASSAY OF NATRIURETIC PEPTIDE: CPT | Performed by: EMERGENCY MEDICINE

## 2019-01-10 PROCEDURE — 97163 PT EVAL HIGH COMPLEX 45 MIN: CPT

## 2019-01-10 PROCEDURE — 86900 BLOOD TYPING SEROLOGIC ABO: CPT | Performed by: EMERGENCY MEDICINE

## 2019-01-10 PROCEDURE — 86850 RBC ANTIBODY SCREEN: CPT | Performed by: EMERGENCY MEDICINE

## 2019-01-10 PROCEDURE — 86901 BLOOD TYPING SEROLOGIC RH(D): CPT | Performed by: EMERGENCY MEDICINE

## 2019-01-10 PROCEDURE — G8978 MOBILITY CURRENT STATUS: HCPCS

## 2019-01-10 PROCEDURE — G8979 MOBILITY GOAL STATUS: HCPCS

## 2019-01-10 PROCEDURE — 99223 1ST HOSP IP/OBS HIGH 75: CPT | Performed by: PHYSICIAN ASSISTANT

## 2019-01-10 PROCEDURE — 71045 X-RAY EXAM CHEST 1 VIEW: CPT

## 2019-01-10 PROCEDURE — 80048 BASIC METABOLIC PNL TOTAL CA: CPT | Performed by: EMERGENCY MEDICINE

## 2019-01-10 PROCEDURE — 97760 ORTHOTIC MGMT&TRAING 1ST ENC: CPT

## 2019-01-10 RX ORDER — SERTRALINE HYDROCHLORIDE 25 MG/1
25 TABLET, FILM COATED ORAL DAILY
Status: DISCONTINUED | OUTPATIENT
Start: 2019-01-10 | End: 2019-01-17 | Stop reason: HOSPADM

## 2019-01-10 RX ORDER — HYDROMORPHONE HCL/PF 1 MG/ML
0.2 SYRINGE (ML) INJECTION EVERY 4 HOURS PRN
Status: DISCONTINUED | OUTPATIENT
Start: 2019-01-10 | End: 2019-01-11

## 2019-01-10 RX ORDER — AMLODIPINE BESYLATE 5 MG/1
5 TABLET ORAL DAILY
Status: DISCONTINUED | OUTPATIENT
Start: 2019-01-10 | End: 2019-01-11

## 2019-01-10 RX ORDER — LIDOCAINE 50 MG/G
1 PATCH TOPICAL DAILY
Status: DISCONTINUED | OUTPATIENT
Start: 2019-01-10 | End: 2019-01-17 | Stop reason: HOSPADM

## 2019-01-10 RX ORDER — HYDRALAZINE HYDROCHLORIDE 20 MG/ML
10 INJECTION INTRAMUSCULAR; INTRAVENOUS ONCE
Status: COMPLETED | OUTPATIENT
Start: 2019-01-10 | End: 2019-01-10

## 2019-01-10 RX ORDER — SERTRALINE HYDROCHLORIDE 25 MG/1
25 TABLET, FILM COATED ORAL DAILY
COMMUNITY
End: 2019-02-19

## 2019-01-10 RX ORDER — GABAPENTIN 300 MG/1
300 CAPSULE ORAL DAILY
Status: DISCONTINUED | OUTPATIENT
Start: 2019-01-11 | End: 2019-01-17 | Stop reason: HOSPADM

## 2019-01-10 RX ORDER — B-COMPLEX WITH VITAMIN C
2 TABLET ORAL 2 TIMES DAILY WITH MEALS
Status: DISCONTINUED | OUTPATIENT
Start: 2019-01-10 | End: 2019-01-10 | Stop reason: SDUPTHER

## 2019-01-10 RX ORDER — ACETAMINOPHEN 325 MG/1
975 TABLET ORAL EVERY 8 HOURS SCHEDULED
Status: DISCONTINUED | OUTPATIENT
Start: 2019-01-10 | End: 2019-01-17 | Stop reason: HOSPADM

## 2019-01-10 RX ORDER — FUROSEMIDE 10 MG/ML
20 INJECTION INTRAMUSCULAR; INTRAVENOUS ONCE
Status: COMPLETED | OUTPATIENT
Start: 2019-01-10 | End: 2019-01-10

## 2019-01-10 RX ORDER — B-COMPLEX WITH VITAMIN C
1 TABLET ORAL 2 TIMES DAILY WITH MEALS
Status: DISCONTINUED | OUTPATIENT
Start: 2019-01-10 | End: 2019-01-17 | Stop reason: HOSPADM

## 2019-01-10 RX ORDER — HEPARIN SODIUM 5000 [USP'U]/ML
5000 INJECTION, SOLUTION INTRAVENOUS; SUBCUTANEOUS EVERY 8 HOURS SCHEDULED
Status: DISCONTINUED | OUTPATIENT
Start: 2019-01-10 | End: 2019-01-15

## 2019-01-10 RX ORDER — DOCUSATE SODIUM 100 MG/1
100 CAPSULE, LIQUID FILLED ORAL 2 TIMES DAILY
Status: DISCONTINUED | OUTPATIENT
Start: 2019-01-10 | End: 2019-01-17 | Stop reason: HOSPADM

## 2019-01-10 RX ORDER — CLOPIDOGREL BISULFATE 75 MG/1
75 TABLET ORAL DAILY
Status: DISCONTINUED | OUTPATIENT
Start: 2019-01-10 | End: 2019-01-17 | Stop reason: HOSPADM

## 2019-01-10 RX ORDER — GABAPENTIN 300 MG/1
600 CAPSULE ORAL
Status: DISCONTINUED | OUTPATIENT
Start: 2019-01-10 | End: 2019-01-17 | Stop reason: HOSPADM

## 2019-01-10 RX ORDER — ACETAMINOPHEN 325 MG/1
TABLET ORAL
Status: COMPLETED
Start: 2019-01-10 | End: 2019-01-10

## 2019-01-10 RX ORDER — CHLORAL HYDRATE 500 MG
1000 CAPSULE ORAL DAILY
Status: DISCONTINUED | OUTPATIENT
Start: 2019-01-10 | End: 2019-01-17 | Stop reason: HOSPADM

## 2019-01-10 RX ORDER — ALBUTEROL SULFATE 90 UG/1
2 AEROSOL, METERED RESPIRATORY (INHALATION) EVERY 4 HOURS PRN
COMMUNITY

## 2019-01-10 RX ADMIN — NYSTATIN: 100000 CREAM TOPICAL at 08:08

## 2019-01-10 RX ADMIN — FUROSEMIDE 20 MG: 10 INJECTION, SOLUTION INTRAMUSCULAR; INTRAVENOUS at 09:54

## 2019-01-10 RX ADMIN — HYDRALAZINE HYDROCHLORIDE 10 MG: 20 INJECTION INTRAMUSCULAR; INTRAVENOUS at 02:38

## 2019-01-10 RX ADMIN — METOPROLOL TARTRATE 25 MG: 25 TABLET, FILM COATED ORAL at 23:08

## 2019-01-10 RX ADMIN — LIDOCAINE 1 PATCH: 50 PATCH CUTANEOUS at 09:54

## 2019-01-10 RX ADMIN — OXYCODONE HYDROCHLORIDE 5 MG: 5 TABLET ORAL at 17:49

## 2019-01-10 RX ADMIN — CHLORHEXIDINE GLUCONATE 0.12% ORAL RINSE 15 ML: 1.2 LIQUID ORAL at 08:07

## 2019-01-10 RX ADMIN — ONDANSETRON 4 MG: 2 INJECTION INTRAMUSCULAR; INTRAVENOUS at 06:03

## 2019-01-10 RX ADMIN — HYDROMORPHONE HYDROCHLORIDE 0.5 MG: 1 INJECTION, SOLUTION INTRAMUSCULAR; INTRAVENOUS; SUBCUTANEOUS at 05:51

## 2019-01-10 RX ADMIN — NYSTATIN: 100000 CREAM TOPICAL at 05:30

## 2019-01-10 RX ADMIN — ACETAMINOPHEN 975 MG: 325 TABLET, FILM COATED ORAL at 19:14

## 2019-01-10 RX ADMIN — DOCUSATE SODIUM 100 MG: 100 CAPSULE, LIQUID FILLED ORAL at 17:49

## 2019-01-10 RX ADMIN — ACETAMINOPHEN 650 MG: 325 TABLET, FILM COATED ORAL at 05:04

## 2019-01-10 RX ADMIN — LOSARTAN POTASSIUM 100 MG: 50 TABLET, FILM COATED ORAL at 08:07

## 2019-01-10 RX ADMIN — LEVOTHYROXINE SODIUM 112 MCG: 112 TABLET ORAL at 05:04

## 2019-01-10 RX ADMIN — OXYCODONE HYDROCHLORIDE 5 MG: 5 TABLET ORAL at 13:19

## 2019-01-10 RX ADMIN — AMLODIPINE BESYLATE 5 MG: 5 TABLET ORAL at 08:07

## 2019-01-10 RX ADMIN — HEPARIN SODIUM 5000 UNITS: 5000 INJECTION INTRAVENOUS; SUBCUTANEOUS at 13:16

## 2019-01-10 RX ADMIN — LORAZEPAM 0.5 MG: 0.5 TABLET ORAL at 09:13

## 2019-01-10 RX ADMIN — HYDROMORPHONE HYDROCHLORIDE 0.5 MG: 1 INJECTION, SOLUTION INTRAMUSCULAR; INTRAVENOUS; SUBCUTANEOUS at 02:38

## 2019-01-10 RX ADMIN — OXYCODONE HYDROCHLORIDE 5 MG: 5 TABLET ORAL at 23:09

## 2019-01-10 RX ADMIN — HEPARIN SODIUM 5000 UNITS: 5000 INJECTION INTRAVENOUS; SUBCUTANEOUS at 23:09

## 2019-01-10 RX ADMIN — HYDROMORPHONE HYDROCHLORIDE 0.2 MG: 1 INJECTION, SOLUTION INTRAMUSCULAR; INTRAVENOUS; SUBCUTANEOUS at 14:50

## 2019-01-10 RX ADMIN — CALCIUM CARBONATE 500 MG (1,250 MG)-VITAMIN D3 200 UNIT TABLET 1 TABLET: at 17:49

## 2019-01-10 RX ADMIN — OXYCODONE HYDROCHLORIDE 2.5 MG: 5 TABLET ORAL at 09:13

## 2019-01-10 RX ADMIN — ACETAMINOPHEN 975 MG: 325 TABLET ORAL at 19:14

## 2019-01-10 RX ADMIN — ONDANSETRON 4 MG: 2 INJECTION INTRAMUSCULAR; INTRAVENOUS at 09:24

## 2019-01-10 RX ADMIN — NYSTATIN: 100000 CREAM TOPICAL at 23:13

## 2019-01-10 RX ADMIN — SERTRALINE HYDROCHLORIDE 25 MG: 25 TABLET ORAL at 23:08

## 2019-01-10 RX ADMIN — GABAPENTIN 600 MG: 300 CAPSULE ORAL at 23:08

## 2019-01-10 RX ADMIN — ALBUTEROL SULFATE 2 PUFF: 90 AEROSOL, METERED RESPIRATORY (INHALATION) at 23:36

## 2019-01-10 RX ADMIN — FUROSEMIDE 20 MG: 20 TABLET ORAL at 08:08

## 2019-01-10 RX ADMIN — CLOPIDOGREL 75 MG: 75 TABLET, FILM COATED ORAL at 13:14

## 2019-01-10 NOTE — SOCIAL WORK
MSW Student met with patient and explained her role  Pt was alert and oriented and reported that her emergency contact is her daughter Anat Barcenas [phone]  Pt reported that she lives in a 1 story home with her daughter and her family  Pt reported that there are no steps to enter and only 1 step inside the home  Pt denied any hx of D/A, MH stays, or inpatient PT/OT  Pt reported that she had [address] and outpt  PT in the past after a knee surgery  Pt was IPTA with ADLS  Pt uses a cane and walker to ambulate  Pts PCP is through  and her pharmacy of choice is [facility] but they are switching to [facility]  CM reviewed d/c planning process including the following: identifying help at home, patient preference for d/c planning needs, Discharge Lounge, [facility] Meds to Bed program, availability of treatment team to discuss questions or concerns patient and/or family may have regarding understanding medications and recognizing signs and symptoms once discharged  CM also encouraged patient to follow up with all recommended appointments after discharge  Patient advised of importance for patient and family to participate in managing patients medical well being      I have read and agree with the above documentation    ODALYS Mcrae

## 2019-01-10 NOTE — PROGRESS NOTES
Acceptance Note - Critical Care   Leana Christianson 80 y o  female MRN: [id number]  Unit/Bed#: ED 12 Encounter: [id number]    Assessment: 81 yo F w/ history of Stroke, AVR, HTN, Diabetes w/ Type III Odontoid fracture and non-displaced C6 fracture s/p mechanical fall on Plavix  Plan:          Neuro:    - CT c spine: Type III Odontoid fracture extending to R and L superior articular facets and L transverse foramen  Acute non-displaced C6 fracture   - CTA neck: Negative for aneurysm or dissection   - CTH negative   - ASPEN collar   - Neurosurgery consultation  [place] collar  NPO for possible surgery intervention   - Q1 hr neuro checks  C spine precautions   - Pain control w/ scheduled tylenol and PRN Oxycodone and Dilaudid                CV:    - Hx of HTN: Current /76  Restart home Losartan 100mg q Daily, Lopressor 25mg BID  Hold amlodipine 5mg    - Hx of stroke  Hold Plavix for possible surgery tomorrow                Lung:    - Continue to monitor airway given C collar    - 97% on 2L NC  Maintain O2 sat > 94%                  GI:    - No acute issues                 FEN:    - NS @ 100cc/hr given NPO status  - Advance diet per neurosurgery    - replete electrolytes PRN  :    - Cr 1 12  BUN 25  Continue to monitor   - Monitor I/Os   - Restart home Lasix 20mg                  ID:    - No acute issues  Afebrile, continue to monitor WBC                 Heme:    - Hgb 11 8   - SCDs for DVT prophylaxis  Hold chemical                  Endo:   - Hx of hyothyroid  Continue home synthroid 112 mcg q daily   - Hx of Diabetes on no medications  Q6 hr glucose, Sliding Scale Insulin                            Msk/Skin:   - Type II odontoid fracture  [place] collar  C spine precautions  Neurosurgery consult   - Rash underneath L breast and panus   Start Clotrimazole cream                  Disposition: ICU for airway observation     Chief Complaint: Neck pain, R lateral chest wall pain w/ associated SOB    HPI/24hr events: Mechanical fall on Plavix with head strike  Type III Odontoid Fracture and non-displaced C2 fracture  Physical Exam:   Physical Exam   Constitutional: She is oriented to person, place, and time  She appears well-developed and well-nourished  No distress  HENT:   Head: Normocephalic  Right Ear: External ear normal    Left Ear: External ear normal    Eyes: Pupils are equal, round, and reactive to light  Conjunctivae and EOM are normal  Right eye exhibits no discharge  Left eye exhibits no discharge  Neck:   C collar in place   Cardiovascular: Normal rate, regular rhythm, normal heart sounds and intact distal pulses  Exam reveals no friction rub  No murmur heard  Pulmonary/Chest: Effort normal  No respiratory distress  She has wheezes (mild, diffuse)  She exhibits tenderness (R lateral rib cage)  Abdominal: Soft  She exhibits no distension  There is no tenderness  There is no guarding  Musculoskeletal: Normal range of motion  She exhibits edema (b/l legs) and tenderness (R lateral chest)  5/5 strength in all extremities    Lymphadenopathy:     She has no cervical adenopathy  Neurological: She is alert and oriented to person, place, and time  No cranial nerve deficit  She exhibits normal muscle tone  Skin: Rash (Red rash under left breast) noted  Vitals:    19 2035 19 2040 19 2045 19 2100   BP: 169/70 (!) 205/73 (!) 198/75 (!) 190/77   Pulse: 70 64 62 63   Resp: 22 16 16 16   Temp: 98 8 °F (37 1 °C)      TempSrc: Oral      SpO2: 99% 99% 99% 93%             Temperature:   Temp (24hrs), Av 8 °F (37 1 °C), Min:98 8 °F (37 1 °C), Max:98 8 °F (37 1 °C)    Current: Temperature: 98 8 °F (37 1 °C)    Weights: There is no height or weight on file to calculate BMI    Weight (last 2 days)     None          Hemodynamic Monitoring:  N/A     Non-Invasive/Invasive Ventilation Settings:  Respiratory    Lab Data (Last 4 hours)    None O2/Vent Data (Last 4 hours)    None              No results found for: PHART, OWL4WFD, PO2ART, STB0HMA, Q3OGDGNF, BEART, SOURCE  SpO2: SpO2: 93 %    Intake and Outputs:  I/O     None     Nutrition:        Diet Orders            Start     Ordered    01/09/19 2118  Diet NPO  Diet effective now     Question Answer Comment   Diet Type NPO    RD to adjust diet per protocol? Yes        01/09/19 2124          Labs:     Results from last 7 days  Lab Units 01/09/19 2049 01/09/19 2043   WBC Thousand/uL 8 84  --    HEMOGLOBIN g/dL 11 8  --    I STAT HEMOGLOBIN g/dl  --  12 2   HEMATOCRIT % 37 5  --    HEMATOCRIT, ISTAT %  --  36   PLATELETS Thousands/uL 183  --    NEUTROS PCT % 63  --    MONOS PCT % 6  --       Results from last 7 days  Lab Units 01/09/19 2049 01/09/19 2043   SODIUM mmol/L 137  --    POTASSIUM mmol/L 4 4  --    CHLORIDE mmol/L 102  --    CO2 mmol/L 27  --    CO2, I-STAT mmol/L  --  31   BUN mg/dL 25  --    CREATININE mg/dL 1 12  --    CALCIUM mg/dL 9 0  --    ALK PHOS U/L 70  --    ALT U/L 38  --    AST U/L 36  --    GLUCOSE, ISTAT mg/dl  --  178*                      No results found for: TROPONINI    Imaging:  I have personally reviewed pertinent reports        EKG: I have personally reviewed pertinent reports    Micro:  No results found for: Jessi Crate, WOUNDCULT, SPUTUMCULTUR    Allergies: No Known Allergies    Medications:   Scheduled Meds:  Current Facility-Administered Medications:  [START ON 1/10/2019] acetaminophen 650 mg Oral Q6H [address] Cookie Shave, DO   chlorhexidine 15 mL Swish & Spit Q12H [address] Cookie Shave, DO   heparin (porcine) 5,000 Units Subcutaneous [facility] Cookie Yuri, DO   HYDROmorphone 0 5 mg Intravenous Q4H PRN Michelle Adam PA-C   oxyCODONE 2 5 mg Oral Q4H PRN Michelle Jair PA-C   oxyCODONE 5 mg Oral Q4H PRN Michelle Adam PA-C   sodium chloride 100 mL/hr Intravenous Continuous Cookie Shave, DO     Continuous Infusions:  sodium chloride 100 mL/hr     PRN Meds:    HYDROmorphone 0 5 mg Q4H PRN   oxyCODONE 2 5 mg Q4H PRN   oxyCODONE 5 mg Q4H PRN       VTE Pharmacologic Prophylaxis: Sequential compression device (Venodyne)   VTE Mechanical Prophylaxis: reason for no mechanical VTE prophylaxis possible OR tomorrow    Invasive lines and devices: Invasive Devices     Peripheral Intravenous Line            Peripheral IV 01/09/19 Left Antecubital less than 1 day    Peripheral IV 01/09/19 Left Hand less than 1 day                   Counseling / Coordination of Care  Total time spent today 30 minutes  Greater than 50% of total time was spent with the patient and / or family counseling and / or coordination of care  A description of the counseling / coordination of care: patient care     Code Status: Level 1 - Full Code     Portions of the record may have been created with voice recognition software  Occasional wrong word or "sound a like" substitutions may have occurred due to the inherent limitations of voice recognition software  Read the chart carefully and recognize, using context, where substitutions have occurred       Joel Larios DO

## 2019-01-10 NOTE — ED PROVIDER NOTES
Emergency Department Airway Evaluation and Management Form    History  Obtained from: medics  Patient has no allergy information on record  No chief complaint on file  HPI     80 yr old fem on plavix fell and hit her head  Now with neck pain  No past medical history on file  No past surgical history on file  No family history on file  Social History   Substance Use Topics    Smoking status: Not on file    Smokeless tobacco: Not on file    Alcohol use Not on file     I have reviewed and agree with the history as documented  Review of Systems    Physical Exam  There were no vitals taken for this visit  Physical Exam:     GCS 15  Lungs: cta  Ox good  BS equal bilaterally       ED Medications  Medications - No data to display    Intubation  Procedures    Notes  No airway intervention    CritCare Time    Final Diagnosis  Final diagnoses:   None       ED Provider  Electronically Signed by     Key Huynh DO  01/09/19 3906

## 2019-01-10 NOTE — PROGRESS NOTES
01/10/19 176 [facility]   Spiritual Beliefs/Perceptions   Support Systems Children   Stress Factors   Patient Stress Factors None identified   Family Stress Factors None identified   Coping Responses   Patient Coping Open/discussion   Family Coping Open/discussion   Plan of Care   Comments Cultivated a caring and supportive presence     Assessment Completed by: Unit visit

## 2019-01-10 NOTE — H&P
H&P Exam - Trauma   Raffi Person 80 y o  female MRN: [id number]  Unit/Bed#: TR 02 Encounter: [id number]    Assessment/Plan   Trauma Alert: Level B  Model of Arrival: Ambulance  Trauma Team: Attending Shay Padilla, Residents Shaylee, Fellow Homer and NATE Shrestha  Consultants: Neurosurgery    Trauma Active Problems:    Fall  Head strike on Plavix   Back pain  Neck pain    Trauma Plan:      Fall     - PT/OT eval   Head Strike on Plavix     -CT Head   Neck Pain/Back Pain      - CT Neck      - CTA Neck      - Neurosurgery Consulted      -Ambulatory trial      -Pain control     Chief Complaint: Back pain    History of Present Illness   HPI:  Raffi Person is a 80 y o  female who presents with  Fall and neck pain on Plavix  Denies LOC  Mechanical fall after slip while walking with two canes  Mechanism:Fall    Review of Systems    Historical Information      No past medical history on file  No past surgical history on file  Social History   History   Alcohol use Not on file     History   Drug use: Unknown     History   Smoking Status    Not on file   Smokeless Tobacco    Not on file       There is no immunization history on file for this patient    Last Tetanus: n/a  Family History: Non-contributory  Unable to obtain/limited by none      Meds/Allergies   current meds:   Current Facility-Administered Medications   Medication Dose Route Frequency     EMS REPLENISHMENT MED   Does not apply Once       Allergies not on file      PHYSICAL EXAM    PE limited by: body habitus     Objective   Vitals:   First set: Temperature: 98 8 °F (37 1 °C) (01/09/19 2035)  Pulse: 70 (01/09/19 2035)  Respirations: 22 (01/09/19 2035)  Blood Pressure: 169/70 (01/09/19 2035)    Primary Survey:   (A) Airway: intact  (B) Breathing: BS equal B/L  (C) Circulation: Pulses:   normal, pedal  2/4 and radial  2/4  (D) Disabliity:  GCS Total:  15  (E) Expose:  Completed    Secondary Survey: (Click on Physical Exam tab above)  Physical Exam   Constitutional: She is oriented to person, place, and time  She appears well-developed and well-nourished  HENT:   Head: Normocephalic  Eyes: Pupils are equal, round, and reactive to light  Conjunctivae are normal    Neck: Normal range of motion  No JVD present  Tracheal tenderness and muscular tenderness present  No spinous process tenderness present  Cardiovascular: Normal rate, regular rhythm, normal heart sounds and intact distal pulses  Exam reveals no gallop and no friction rub  No murmur heard  Pulmonary/Chest: Effort normal and breath sounds normal  No stridor  No respiratory distress  She has no wheezes  Abdominal: Soft  Bowel sounds are normal  She exhibits no distension  There is no tenderness  Musculoskeletal: She exhibits edema  Cervical back: She exhibits tenderness  Thoracic back: She exhibits no tenderness  Lumbar back: She exhibits no tenderness  Neurological: She is alert and oriented to person, place, and time  She displays normal reflexes  No cranial nerve deficit  She exhibits normal muscle tone  Skin: Skin is warm and dry  inframammary rash c/w candidal infection noted    Nursing note and vitals reviewed        Invasive Devices     Peripheral Intravenous Line            Peripheral IV 01/09/19 Right Antecubital less than 1 day    Peripheral IV 01/09/19 Right Antecubital less than 1 day                Lab Results:   BMP/CMP:   Lab Results   Component Value Date    CO2 31 01/09/2019    GLUCOSE 178 (H) 01/09/2019   , CBC:   Lab Results   Component Value Date    HGB 12 2 01/09/2019    HCT 36 01/09/2019    and ISTAT: No components found for: VBG  Imaging/EKG Studies: Results: I have personally reviewed pertinent films in PACS  Other Studies: none    Code Status: No Order  Advance Directive and Living Will:      Power of :    POLST:

## 2019-01-10 NOTE — PROGRESS NOTES
Progress Note - ICU Transfer to SD/MS galdino Hernandez 80 y o  female MRN: [id number]  [facility]   [facility] Encounter: [id number]    Code Status: Level 1 - Full Code  POA:    POLST:      Reason for ICU admission: Type III odontoid fracture    Active problems:   Principal Problem:    Type III fracture of odontoid process ([address] )  Active Problems:    Closed nondisplaced fracture of second cervical vertebra ([address] )    Neck pain, acute    C6 cervical fracture ([address] )    Hypertension    Hypothyroid  Resolved Problems:    * No resolved hospital problems  *      Consultants:   Neurosurgery, geriatrics     History of Present Illness:  Patient is a 80year old female presenting on 1/9 as a level B trauma following a mechanical fall  She has a past medical history of breast cancer, type 2 diabetes, hypertension, hypothyroidism, fibrom,yalgia, renal artery stenosis, and TIA  Her imaging was notable for a type III odontoid fracture and a nondisplaced fracture through bridgin osteophytes at the C6 level  She was referred to the critical care unit for closer monitoring  Summary of clinical course:   She had a stable neurologic exam overnight  Her laboratory work-up was notable for an elevated BNP and she will be given one dose of IV Lasix today and return to her home dose of Lasix tomorrow  Neurosurgery was contacted and they recommended non-operative management with upright xrays and the Aspen collar  A consult for geriatrics was placed and she is felt to be stable for transiton to a medical surgical floor with telemetry       Recent or scheduled procedures:   1/10 CXR- Bibasilar opacities, stable mild enlargement of cardiac silhouette  1/9 CTA Neck- No evidence of aneurysm or dissection, type 3 dens fracture with component extending to the left C2 transverse foramen, nondisplaced fracture anterior T6 bridging osteophyte, areas of atherosclerotic plque narrowing  1/9 CT Head- No acute intracranial abnormality    Outstanding/pending diagnostics:   Official neurosurgery consult, upright xrays, geriatrics consult, PT/OT    Cultures:   None       Mobilization Plan:   Out of bed with assistance    Nutrition Plan:   Regular diet    VTE Pharmacologic Prophylaxis: Heparin  VTE Mechanical Prophylaxis: sequential compression device    Discharge Plan:   Patient should be ready for discharge to home/rehab after neurosurgery eval/PT/OT    Initial Physical Therapy Recommendations: Pending  Initial Occupational Therapy Recommendations: Pending  Initial /Plan: Following    Home medications that are not reordered and reason why:   Percocet- on oral pain control  Plaquenil- Pending final evaluations, likely resume on discharge  Losartan- Watch renal function following IV diuresis    Spoke with Raven Ross PA-C regarding transfer  Please call [phone] with any questions or concerns  Portions of the record may have been created with voice recognition software  Occasional wrong word or "sound a like" substitutions may have occurred due to the inherent limitations of voice recognition software  Read the chart carefully and recognize, using context, where substitutions have occurred      NATHEN Dill

## 2019-01-10 NOTE — PROGRESS NOTES
01/09/19 6743   Plan of Care   Comments  provided support to staff, hospitality to family members, and was a silent and supportive presence      Assessment Completed by: Unit visit

## 2019-01-10 NOTE — CONSULTS
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      Plan:   · Exam: GCS 15, A&OX3, EOMI, 2mm, conjugate, SF, Finger to nose is normal and without drift  Bilaterally  Strength is 5/5 throughout  Sensation to light touch is normal in all extremities  Neck exam:  [place] Nucla collar on  Tenderness at C2 region and C6 on deep palpation  +3 bilateral pedal and pretibial edema noted on extremity exam   · Images:  CT of cervical spine on 01/09/2019 demonstrates traumatic type 3 odontoid fracture with extension to the left superior articular facet and transverse facet of C2    · Pain control:  Per primary team  · DVT ppx:   · SCDs  · Continue heparin subcu  · Activity:  As tolerated  · PT/OT evaluation & treatment  · Brace:  [place]  Nucla cervical collar all the time, Estefani collar for shower  · Medical Mx:  Per primary team  · Neurocheck:  Routine  · NSx  Following the patient by neuromonitoring and reviewing images  Upright cervical spine x-rays AP, lateral and open-mouth views ordered  results pending  Call for concern or problem  HPI: Nancy Horton is a 80y o  year old female with PMH of AVR, HTN, DM, CVA 2x, left knee replacement, and gait instability admitted with traumatic type III odontoid fracture and non displaced fracture of C6    Patient lives with her daughter, she usually uses cane for walking, but her cane slid and  fell forward on her right side   Patient denies loss of consciousness, seizure, numbness, weakness, paresthesia and extremities except chronic residual underlying tingling sensation in the feet and upper extremities related to head diabetes mellitus  Patient is nauseated but denies any vomiting  Denies urinary or bowel issues  Denies blurry vision diplopia  Denies fever, chills, rigors, cough or chest pain  Both her diabetes mellitus and hypertension is controlled with medication  Patient denies history of headache or seizure  She has history of AVR, peripheral edema on Lasix  She takes Plavix  Denies history of smoking cigarettes but drinks alcohol very occasionally  Head CT cervical spine demonstrates traumatic type 3 odontoid fracture with extension to the left superior articular facet and also transverse facet of C2 fracture, C6 fracture through osteophytic bridging complex  Review of Systems   Constitutional: Negative for appetite change, chills, fatigue and fever  HENT: Negative for trouble swallowing and voice change  Eyes: Negative for photophobia and visual disturbance  Respiratory: Negative for apnea, chest tightness, shortness of breath and stridor  Cardiovascular: Positive for leg swelling  Negative for chest pain  Gastrointestinal: Negative for constipation, nausea and vomiting  Genitourinary: Negative for difficulty urinating  Musculoskeletal: Positive for gait problem, neck pain and neck stiffness  Negative for back pain  Skin: Positive for wound  Small bruise on the left shoulder   Neurological: Negative for dizziness, tremors, seizures, syncope, facial asymmetry, speech difficulty, weakness, light-headedness, numbness and headaches  Hematological: Does not bruise/bleed easily  Psychiatric/Behavioral: Negative for decreased concentration and dysphoric mood         Historical Information   Past Medical History:   Diagnosis Date    [facility]     Hypertension     [facility]roke [facility])      History reviewed  No pertinent surgical history  History   Alcohol Use No     History   Drug use: Unknown     History   Smoking Status    Unknown If Ever Smoked   Smokeless Tobacco    Never Used     History reviewed  No pertinent family history  Meds/Allergies   all current active meds have been reviewed and current meds:   Current Facility-Administered Medications   Medication Dose Route Frequency    acetaminophen (TYLENOL) tablet 975 mg  975 mg Oral Q8H [address]    albuterol (PROVENTIL HFA,VENTOLIN HFA) inhaler 2 puff  2 puff Inhalation Q4H PRN    albuterol inhalation solution 2 5 mg  2 5 mg Nebulization Q6H PRN    amLODIPine (NORVASC) tablet 5 mg  5 mg Oral Daily    calcium carbonate-vitamin D (OSCAL-D) 500 mg-200 units per tablet 1 tablet  1 tablet Oral BID With Meals    clopidogrel (PLAVIX) tablet 75 mg  75 mg Oral Daily    docusate sodium (COLACE) capsule 100 mg  100 mg Oral BID    fish oil capsule 1,000 mg  1,000 mg Oral Daily    gabapentin (NEURONTIN) capsule 100 mg  100 mg Oral HS    heparin (porcine) subcutaneous injection 5,000 Units  5,000 Units Subcutaneous Q8H [address]    HYDROmorphone (DILAUDID) injection 0 2 mg  0 2 mg Intravenous Q4H PRN    levothyroxine tablet 112 mcg  112 mcg Oral Early Morning    lidocaine (LIDODERM) 5 % patch 1 patch  1 patch Topical Daily    losartan (COZAAR) tablet 100 mg  100 mg Oral Daily    metoprolol tartrate (LOPRESSOR) tablet 25 mg  25 mg Oral Q12H KARINE    nystatin (MYCOSTATIN) cream   Topical BID    ondansetron (ZOFRAN) injection 4 mg  4 mg Intravenous Q4H PRN    oxyCODONE (ROXICODONE) IR tablet 2 5 mg  2 5 mg Oral Q4H PRN    oxyCODONE (ROXICODONE) IR tablet 5 mg  5 mg Oral Q4H PRN    sertraline (ZOLOFT) tablet 25 mg  25 mg Oral Daily     No Known Allergies    Objective   I/O       01/08 0701 - 01/09 0700 01/09 0701 - 01/10 0700 01/10 0701 - 01/11 0700    P  O   150 30    I V  (mL/kg)  478 3 (5 1) 345 8 (3 7)    Total Intake(mL/kg)  628 3 (6 7) 375 8 (4)    Urine (mL/kg/hr)  200 291 (0 6)    Total Output   200 291    Net   +428 3 +84 8           Unmeasured Urine Occurrence  1 x           Physical Exam   Constitutional: She appears well-developed and well-nourished  HENT:   Head: Normocephalic and atraumatic  Eyes: Pupils are equal, round, and reactive to light  Conjunctivae and EOM are normal    Neck: Normal range of motion  Cardiovascular: Normal rate and regular rhythm  Pulmonary/Chest: Effort normal and breath sounds normal    Abdominal: Soft  Bowel sounds are normal    Musculoskeletal: Normal range of motion  Neurological: She is alert  She has normal strength and normal reflexes  She has a normal Finger-Nose-Finger Test  GCS eye subscore is 4  GCS verbal subscore is 5  GCS motor subscore is 6  She displays no Babinski's sign on the right side  She displays no Babinski's sign on the left side  Reflex Scores:       Tricep reflexes are 2+ on the right side and 2+ on the left side  Bicep reflexes are 2+ on the right side and 2+ on the left side  Brachioradialis reflexes are 2+ on the right side and 2+ on the left side  Patellar reflexes are 2+ on the right side and 2+ on the left side  Achilles reflexes are 2+ on the right side and 2+ on the left side  Skin: Skin is warm and dry  Psychiatric: Her speech is normal      Neurologic Exam     Mental Status   Disoriented to person  Disoriented to place  Oriented to country, city and area  Oriented to time  Disoriented to date  Oriented to year and month  Registration: recalls 3 of 3 objects  Recall at 5 minutes: recalls 2 of 3 objects  Attention: normal  Concentration: normal    Speech: speech is normal   Knowledge: good  Able to perform simple calculations  Able to name object       Cranial Nerves     CN II   Visual acuity: normal  Right visual field deficit: none  Left visual field deficit: none     CN III, IV, VI   Pupils are equal, round, and reactive to light  Extraocular motions are normal    Right pupil: Size: 2 mm  Shape: regular  Reactivity: brisk  Left pupil: Size: 2 mm  Shape: regular  Reactivity: brisk  Nystagmus: none     CN V   Right facial sensation deficit: none  Left facial sensation deficit: none    CN VII   Right facial weakness: none  Left facial weakness: none    CN XI   CN XI normal      CN XII   CN XII normal      Motor Exam   Muscle bulk: normal  Overall muscle tone: normal  Right arm tone: normal  Left arm tone: normal  Right arm pronator drift: absent  Left arm pronator drift: absent  Right leg tone: normal  Left leg tone: normal    Strength   Strength 5/5 throughout  Sensory Exam   Light touch normal    Proprioception normal    Pinprick normal      Gait, Coordination, and Reflexes     Coordination   Finger to nose coordination: normal    Reflexes   Right brachioradialis: 2+  Left brachioradialis: 2+  Right biceps: 2+  Left biceps: 2+  Right triceps: 2+  Left triceps: 2+  Right patellar: 2+  Left patellar: 2+  Right achilles: 2+  Left achilles: 2+  Right : 2+  Left : 2+  Right plantar: normal  Left plantar: normal  Right Anderson: absent  Left Anderson: absent  Right ankle clonus: absent  Left ankle clonus: absent      Vitals:Blood pressure 155/61, pulse 56, temperature 98 1 °F (36 7 °C), temperature source Oral, resp  rate 20, height 4' 11 75" (1 518 m), weight 93 3 kg (205 lb 11 oz), SpO2 93 %  ,Body mass index is 40 51 kg/m²       Lab Results:     Results from last 7 days  Lab Units 01/10/19  0530 01/09/19 2049 01/09/19 2043   WBC Thousand/uL 9 36 8 84  --    HEMOGLOBIN g/dL 10 9* 11 8  --    I STAT HEMOGLOBIN g/dl  --   --  12 2   HEMATOCRIT % 34 9 37 5  --    HEMATOCRIT, ISTAT %  --   --  36   PLATELETS Thousands/uL 156 183  --    NEUTROS PCT % 78* 63  --    MONOS PCT % 6 6  --        Results from last 7 days  Lab Units 01/10/19  0530 01/09/19 2049 01/09/19 2043   POTASSIUM mmol/L 4 7 4 4  -- CHLORIDE mmol/L 104 102  --    CO2 mmol/L 24 27  --    CO2, I-STAT mmol/L  --   --  31   BUN mg/dL 22 25  --    CREATININE mg/dL 1 00 1 12  --    CALCIUM mg/dL 8 8 9 0  --    ALK PHOS U/L  --  70  --    ALT U/L  --  38  --    AST U/L  --  36  --    GLUCOSE, ISTAT mg/dl  --   --  178*               Results from last 7 days  Lab Units 01/10/19  0530   INR  1 02     No results found for: TROPONINT  ABG:No results found for: PHART, XIR2CKV, PO2ART, WNV5XCC, M8AFNTMT, BEART, SOURCE    Imaging Studies: I have personally reviewed pertinent reports  and I have personally reviewed pertinent films in PACS    EKG, Pathology, and Other Studies: I have personally reviewed pertinent reports  and I have personally reviewed pertinent films in PACS    VTE Prophylaxis: Sequential compression device (Venodyne)     Code Status: Level 1 - Full Code  Advance Directive and Living Will:      Power of :    POLST:      Counseling / Coordination of Care  I spent 20 minutes with the patient

## 2019-01-10 NOTE — PLAN OF CARE
Problem: PHYSICAL THERAPY ADULT  Goal: Performs mobility at highest level of function for planned discharge setting  See evaluation for individualized goals  Treatment/Interventions: LE strengthening/ROM, Functional transfer training, Elevations, Therapeutic exercise, Endurance training, Cognitive reorientation, Patient/family training, Equipment eval/education, Bed mobility, Gait training, Compensatory technique education, Continued evaluation, Spoke to nursing  Equipment Recommended: Rickey López (wide )       See flowsheet documentation for full assessment, interventions and recommendations    Prognosis: Good  Problem List: Decreased strength, Decreased range of motion, Decreased endurance, Impaired balance, Decreased coordination, Decreased mobility, Decreased safety awareness, Pain  Assessment: Pt is a 80 y o  female admitted to [facility] on 1/9/2019 w/ Type III fracture of odontoid process ([facility] [address] ); is currently in Ccollar w spine precautions Pt exhibits significant impairments with weakness, decreased ROM, impaired balance, decreased endurance, impaired coordination, gait deviations, pain, decreased activity tolerance, decreased functional mobility tolerance, decreased safety awareness, impaired judgement, fall risk, orthopedic restrictions and decreased skin integrity; these impact independence with mobility, ADLs, and IADLs; requires min assist w sup-sit EOB w HOB elev use of bed rails and bed pad- pt required significant increased time exhibits paucity of movement, needs multiple rest intervals and encouragement to progress w mob; objective measures on the Barthel Index also reveal limitations;  therapy prognosis is impacted by relevant co morbidities as noted in evaluation; clinical presentation is currently unstable/unpredictable - pt is on cont pulse oximetry, pain inadequately controlled, presents w complex hx and phys impairments as noted- a regression from baseline; PTA, pt was Independent with mobility lives in single level setup  skilled PT is indicated to to optimize functional independence and discharge planning; pending functional progress, PT recommendation at discharge is IP rehab         Recommendation: Post acute IP rehab          See flowsheet documentation for full assessment

## 2019-01-10 NOTE — OCCUPATIONAL THERAPY NOTE
Occupational Therapy Cancellation Note        Patient Name: Jean Jacinto  Today's Date: 1/10/2019        OT orders received  Chart reviewed  Awaiting neurosurgery consult at this time   OT will continue to follow and see as able for initial eval     Patrick Garcia MS, OTR/L

## 2019-01-10 NOTE — PROGRESS NOTES
01/10/19 1000   Clinical Encounter Type   Visited With Patient and family together   Routine Visit Introduction   Continue Visiting Yes   Family Spiritual Encounters   Family Coping Open/discussion

## 2019-01-10 NOTE — ORTHOTIC NOTE
Orthotic Note            Date: 1/10/2019      Patient Name: Mojgan Solano        Time: 14:15pm    Reason for Consult:  Patient Active Problem List   Diagnosis    Closed nondisplaced fracture of second cervical vertebra ([facility] )    Neck pain, acute    Type III fracture of odontoid process ([facility] )    C6 cervical fracture ([facility] )    Hypertension    Hypothyroid    Fall    Forgetfulness    Ambulatory dysfunction    Physical deconditioning    Acute pain   Laverne Dante/Estefani Collar [id number]  Per Neurosurgical    I measured, fit, and donned [employer] while patient was supine in bed  Pt tolerated well and instructions/adjustments reviewed at this time  I molded bilateral sides and adjusted anterior chin plate to level 2 for optimal fit  Instructions, vista replacement pads, my contact information, and Estefani shower collar at bedside  RN kimi Bowden  Assisted with splinting c-spine  during this fitting I will continue daily follow up with patient  Recommendations:  Please call Mobility Coordinator at ext  [phone] in regards to bracing instruction and/or adjustment  Jules Hobson Mobility Coordinator LCFo, LCOF, ASOP R  O T, O B T

## 2019-01-10 NOTE — PHYSICAL THERAPY NOTE
Physical Therapy Evaluation    Patient's Name:Chely Davila    Today's Date:01/10/19    Patient Active Problem List   Diagnosis    Closed nondisplaced fracture of second cervical vertebra ([facility] [address] )    Neck pain, acute    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)    C6 cervical fracture (HCC)    Hypertension    Hypothyroid    Fall    Forgetfulness    Ambulatory dysfunction    Physical deconditioning    Acute pain       Past Medical History:   Diagnosis Date    [facility]     Hypertension     [facility]roke [facility])        History reviewed  No pertinent surgical history  01/10/19 1500   Pain Assessment   Pain Assessment FLACC   Pain Score 6   Hospital Pain Intervention(s) Ambulation/increased activity   Response to Interventions increased pain w mobilization   Pain Rating: FLACC (Rest) - Face 1   Pain Rating: FLACC (Rest) - Legs 1   Pain Rating: FLACC (Rest) - Activity 1   Pain Rating: FLACC (Rest) - Cry 2   Pain Rating: FLACC (Rest) - Consolability 1   Score: FLACC (Rest) 6   Home Living   Type of [address] One level;Ramped entrance   Prior Function   Level of [place] (amb w 2 canes)   Lives With [facility] Help From Family   Restrictions/Precautions   Braces or Orthoses C/S Collar   Other Precautions Pain; Fall Risk;Spinal precautions   General   Family/Caregiver Present Yes   Cognition   Arousal/Participation Alert   Orientation Level Oriented to person;Oriented to place;Oriented to situation   Following Commands Follows all commands and directions without difficulty   RUE Assessment   RUE Assessment X  (limited reach and propulsion of RW)   LUE Assessment   LUE Assessment X  (limited reach and propulsion of RW)   RLE Assessment   RLE Assessment X   Strength RLE   RLE Overall Strength 3-/5   LLE Assessment   LLE Assessment X   Strength LLE   LLE Overall Strength 3-/5   Coordination   Movements are Fluid and Coordinated 0   Coordination and Movement Description antalgic LE instability   Bed Mobility   Supine to Sit 4  Minimal assistance   Additional items HOB elevated; Bedrails; Increased time required;Verbal cues;LE management   Transfers   Sit to Stand 4  Minimal assistance   Additional items Increased time required;Verbal cues   Stand to Sit 4  Minimal assistance   Additional items Increased time required;Verbal cues  (RW)   Balance   Static Sitting Fair   Dynamic Sitting Fair -  (forward reach)   Endurance Deficit   Endurance Deficit Yes   Endurance Deficit Description antalgic, fatigue   Activity Tolerance   Activity Tolerance Patient limited by fatigue;Patient limited by pain   Nurse Made Aware yes   Assessment   Prognosis Good   Problem List Decreased strength;Decreased range of motion;Decreased endurance; Impaired balance;Decreased coordination;Decreased mobility; Decreased safety awareness;Pain   Assessment Pt is a 80 y o  female admitted to [facility] on 1/9/2019 w/ Type III fracture of odontoid process ([facility] [address] ); is currently in Ccollar w spine precautions Pt exhibits significant impairments with weakness, decreased ROM, impaired balance, decreased endurance, impaired coordination, gait deviations, pain, decreased activity tolerance, decreased functional mobility tolerance, decreased safety awareness, impaired judgement, fall risk, orthopedic restrictions and decreased skin integrity; these impact independence with mobility, ADLs, and IADLs; requires min assist w sup-sit EOB w HOB elev use of bed rails and bed pad- pt required significant increased time exhibits paucity of movement, needs multiple rest intervals and encouragement to progress w mob; objective measures on the Barthel Index also reveal limitations;  therapy prognosis is impacted by relevant co morbidities as noted in evaluation; clinical presentation is currently unstable/unpredictable - pt is on cont pulse oximetry, pain inadequately controlled, presents w complex hx and phys impairments as noted- a regression from baseline; PTA, pt was Independent with mobility lives in single level setup  skilled PT is indicated to to optimize functional independence and discharge planning; pending functional progress, PT recommendation at discharge is IP rehab    Goals   Patient Goals not expressed 2* cog deficits   STG Expiration Date 01/20/19   Short Term Goal #1 1  Modified Independent with Bed Mobility Rolling Right and Left     2  Modified Independent with Bed Mobility Supine-Sit     3  Modified Independent with Transfer Bed-Chair     4  Increase Dynamic Sitting Balance at least 1 Grade for improved stability with functional reach activities     5  Increase Dynamic Standing Balance at least 1 Grade for improved ease with Activities of Daily Living     6  Increase Lower Extremity Strength at least 1 Grade for improved ease mobility tasks     7  Modified Independent with  Ambulation 200 feet using RW to facilitate home and community mobility   Plan   Treatment/Interventions LE strengthening/ROM; Functional transfer training;Elevations; Therapeutic exercise; Endurance training;Cognitive reorientation;Patient/family training;Equipment eval/education; Bed mobility;Gait training; Compensatory technique education;Continued evaluation;Spoke to nursing   PT Frequency (4-6x/wk)   Recommendation   Recommendation Post acute IP rehab   Equipment Recommended Walker  (wide )   Barthel Index   Feeding 10   Bathing 0   Grooming Score 0   Dressing Score 5   Bladder Score 10   Bowels Score 10   Toilet Use Score 5   Transfers (Bed/Chair) Score 10   Mobility (Level Surface) Score 0   Stairs Score 0   Barthel Index Score 50     PT Progress Note  Time In:1445  Time Out:1515  Total Time: 30 min  Additional session required 2* pt required Ccollar refitting- + trigger point pressure related tenderness; pt collar refitted for snug fit and pt educated on spine precautions, collar wear; pt then required min assist w transf and amb 14 ft using wide RW-initially trialed 2 steps w bilat canes however increased stability noted w RW- gait pattern slow, decreased foot clearance, wide JEFE, requires multiple rest intervals; pt seated in rec session post amb- reports feeling better; rec cont PT IP rehab when med cleared    Jil Sanchez, PT

## 2019-01-10 NOTE — PLAN OF CARE
DISCHARGE PLANNING     Discharge to home or other facility with appropriate resources Progressing        HEMATOLOGIC - ADULT     Maintains hematologic stability Progressing        INFECTION - ADULT     Absence or prevention of progression during hospitalization Progressing        Knowledge Deficit     Patient/family/caregiver demonstrates understanding of disease process, treatment plan, medications, and discharge instructions Progressing        METABOLIC, FLUID AND ELECTROLYTES - ADULT     Electrolytes maintained within normal limits Progressing     Fluid balance maintained Progressing     Glucose maintained within target range Progressing        MUSCULOSKELETAL - ADULT     Maintain or return mobility to safest level of function Progressing     Maintain proper alignment of affected body part Progressing        NEUROSENSORY - ADULT     Achieves stable or improved neurological status Progressing     Absence of seizures Progressing     Remains free of injury related to seizures activity Progressing     Achieves maximal functionality and self care Progressing        Nutrition/Hydration-ADULT     Nutrient/Hydration intake appropriate for improving, restoring or maintaining nutritional needs Progressing        PAIN - ADULT     Verbalizes/displays adequate comfort level or baseline comfort level Progressing        Potential for Falls     Patient will remain free of falls Progressing        Prexisting or High Potential for Compromised Skin Integrity     Skin integrity is maintained or improved Progressing        SAFETY ADULT     Maintain or return to baseline ADL function Progressing     Maintain or return mobility status to optimal level Progressing        SKIN/TISSUE INTEGRITY - ADULT     Skin integrity remains intact Progressing     Incision(s), wounds(s) or drain site(s) healing without S/S of infection Progressing     Oral mucous membranes remain intact Progressing

## 2019-01-10 NOTE — RESPIRATORY THERAPY NOTE
RT Protocol Note  Graeme Chapman 80 y o  female MRN: [id number]  Unit/Bed#: [facility] 513-01 Encounter: [id number]    Assessment    Principal Problem:    Type III fracture of odontoid process ([facility] )  Active Problems:    Closed nondisplaced fracture of second cervical vertebra ([facility] )    Neck pain, acute    C6 cervical fracture (HCC)    Hypertension    Hypothyroid      Home Pulmonary Medications:  Albuterol MDI PRN       Past Medical History:   Diagnosis Date    CHF (congestive heart failure) ([place] [address] )     Diabetes mellitus ([place] [address] )     Hypertension     Stroke ([place] [address] )      Social History     Social History    Marital status: /Civil Union     Spouse name: N/A    Number of children: N/A    Years of education: N/A     Social History Main Topics    Smoking status: Unknown If Ever Smoked    Smokeless tobacco: Never Used    Alcohol use No    Drug use: Unknown    Sexual activity: Not Asked     Other Topics Concern    None     Social History Narrative    None       Subjective         Objective    Physical Exam:   Assessment Type: Assess only  General Appearance: Awake, Alert  Respiratory Pattern: Normal  Chest Assessment: Chest expansion symmetrical  Bilateral Breath Sounds: Diminished, Expiratory wheezes  O2 Device: 4LNC    Vitals:  Blood pressure 133/92, pulse 58, temperature 97 6 °F (36 4 °C), temperature source Oral, resp  rate (!) 23, height 3' 9" (1 143 m), weight 93 3 kg (205 lb 11 oz), SpO2 93 %  Imaging and other studies: I have personally reviewed pertinent reports  O2 Device: 4LNC     Plan    Respiratory Plan: Home Bronchodilator Patient pathway  Airway Clearance Plan: Incentive Spirometer     Resp Comments: (P) Respiratory and airway clearance protocol initiated at this time  Pt admitted s/p fall  Pt has C6 cervical fracture  Pt states she has no pulmonary history  Pt states she takes an Albuterol MDI at home PRN and was recently prescribed that   Pt's breath sounds are diminished with expiratory wheezes but pt's daughter is at bedside and states that is normal  Pt states she does not feel SOB at the moment  Will order pt on home regimen of Albuterol MDI PRN and Albuterol nebs Q6 PRN  Will continue to monitor pt with IS therapy

## 2019-01-10 NOTE — CONSULTS
Consultation - Geriatrics   Roberta Splinter 80 y o  female MRN: [id number]  Unit/Bed#: [facility] 634-01 Encounter: [id number]      Assessment/Plan  1  Fall  Fall precautions  Home meds unremarkable, patient does not take Ativan at home  Continue with calcium, vitamin-D supplement  PT, OT  Patient would benefit from rehab versus home with home therapy services, suspect she will need rehab given fractures    2  Forgetfulness  Patient admits to feeling forgetful, but is alert oriented x4 and scores 4/5 on mini cog assessment which is normal  Patient encouraged to keep mind active to prevent further decline  Will draw a B12, folate as patient shows signs of macrocytosis  Patient may follow up at [facility] for positive aging upon discharge for any concerns over her memory  Continue supportive care    3  Ambulatory dysfunction  Patient ambulates with 2 canes at baseline  Continue with vitamin D calcium supplement that she takes at home  PT, OT  Patient will likely need rehab status post cervical fractures    4  Deconditioning  Secondary to injuries, hospital stay  Mobilize frequently to prevent further decline  PT, OT    5  Delirium precautions  Continue with pain medication as ordered, see 6  Recommend Colace daily for bowel regimen  Monitor closely for urinary retention, bladder scan and straight cath as needed  Patient at risk for developing delirium, delirium preventing tactics advised  Redirect unwanted patient behaviors as first line tx  Reorient patient frequently  Avoid deliriogenic meds including tramadol, benzodiazepines, benadryl  Good sleep hygiene important, limit night time interruptions  Encourage patient to stay awake during the day  Ensure adequate hydration/nutrition  Mobilize often     6  Acute pain due to trauma  Will adjust Tylenol to 975 mg Q 8  Continue with Lidoderm patch  Continue with oxycodone 2 5, 5 mg p r n  Q 4 for moderate, severe pain  will adjust Dilaudid to 0 2 mg IV p r n  Q 4    7  Depression  Continue with patient's Zoloft 25 mg daily, will restart    8  Type 3 odontoid fracture, nondisplaced C6 fracture  Neurosurgery following        History of Present Illness   Physician Requesting Consult: Destiney Quinn DO  Reason for Consult / Principal Problem: isar  Hx and PE limited by: n/a  HPI: Raffi Person is a 80y o  year old female who presents to [facility] after falling  Patient was ambulating with 2 canes  Patient was found to have type 3 odontoid fracture nondisplaced C6 fracture, she was admitted under the trauma team, Neurosurgery was consulted  Prior to arrival patient lived with her daughter, son-in-law, and her grandchildren  She was independent with ADLs, had some assistance with IADLs  Reports she does not drive  Ambulates with 2 canes at baseline  Admits to feeling forgetful at times, but scores within normal limits on cognitive assessment  Denies depression  Does not take Ativan at baseline which is listed in computer as a home medication  Daughter at bedside, all questions answered  Inpatient consult to Gerontology  Consult performed by: Rosezella Alpers ordered by: Johanna Pozo          Review of Systems   Constitutional: Negative for fatigue  Eyes: Negative for visual disturbance  Respiratory: Negative for chest tightness and shortness of breath  Cardiovascular: Negative for chest pain and palpitations  Gastrointestinal: Negative for abdominal distention, abdominal pain, constipation, diarrhea, nausea and vomiting  Genitourinary: Negative for difficulty urinating  Musculoskeletal: Positive for gait problem, myalgias and neck pain  Neurological: Positive for headaches  Psychiatric/Behavioral: Negative for confusion  Admits to feeling forgetful   All other systems reviewed and are negative        Historical Information   Past Medical History:   Diagnosis Date    CHF [facility]congestive heart failure) ([facility] )     Hypertension     Stroke [facility])      History reviewed  No pertinent surgical history    Social History   History   Alcohol Use No     History   Drug use: Unknown     History   Smoking Status    Unknown If Ever Smoked   Smokeless Tobacco    Never Used         Family History: non-contributory    Meds/Allergies   Current meds:   Current Facility-Administered Medications   Medication Dose Route Frequency    acetaminophen (TYLENOL) tablet 650 mg  650 mg Oral Q6H [facility]    albuterol (PROVENTIL HFA,VENTOLIN HFA) inhaler 2 puff  2 puff Inhalation Q4H PRN    albuterol inhalation solution 2 5 mg  2 5 mg Nebulization Q6H PRN    amLODIPine (NORVASC) tablet 5 mg  5 mg Oral Daily    calcium carbonate-vitamin D (OSCAL-D) 500 mg-200 units per tablet 1 tablet  1 tablet Oral BID With Meals    clopidogrel (PLAVIX) tablet 75 mg  75 mg Oral Daily    fish oil capsule 1,000 mg  1,000 mg Oral Daily    gabapentin (NEURONTIN) capsule 100 mg  100 mg Oral HS    heparin (porcine) subcutaneous injection 5,000 Units  5,000 Units Subcutaneous Q8H [facility]    HYDROmorphone (DILAUDID) injection 0 5 mg  0 5 mg Intravenous Q4H PRN    levothyroxine tablet 112 mcg  112 mcg Oral Early Morning    lidocaine (LIDODERM) 5 % patch 1 patch  1 patch Topical Daily    LORazepam (ATIVAN) tablet 0 5 mg  0 5 mg Oral Q6H PRN    losartan (COZAAR) tablet 100 mg  100 mg Oral Daily    metoprolol tartrate (LOPRESSOR) tablet 25 mg  25 mg Oral Q12H [facility]    nystatin (MYCOSTATIN) cream   Topical BID    ondansetron (ZOFRAN) injection 4 mg  4 mg Intravenous Q4H PRN    oxyCODONE (ROXICODONE) IR tablet 2 5 mg  2 5 mg Oral Q4H PRN    oxyCODONE (ROXICODONE) IR tablet 5 mg  5 mg Oral Q4H PRN      Current PTA meds:  Prescriptions Prior to Admission   Medication    albuterol (PROVENTIL HFA,VENTOLIN HFA) 90 mcg/act inhaler    amLODIPine (NORVASC) 5 mg tablet    calcium carbonate-vitamin D (OSCAL-D) 500 mg-200 units per tablet    calcium-vitamin D 250-100 MG-UNIT per tablet    clopidogrel (PLAVIX) 75 mg tablet    furosemide (LASIX) 20 mg tablet    gabapentin (NEURONTIN) 100 mg capsule    HYDROcodone-acetaminophen (NORCO) 5-325 mg per tablet    hydroxychloroquine (PLAQUENIL) 200 mg tablet    Levothyroxine Sodium 112 MCG CAPS    lidocaine (LIDODERM) 5 %    LORazepam (ATIVAN) 0 5 mg tablet    losartan (COZAAR) 100 MG tablet    LOSARTAN POTASSIUM-HCTZ PO    metoprolol tartrate (LOPRESSOR) 25 mg tablet    Multiple Vitamins-Calcium (ONE-A-DAY WOMENS PO)    nitroglycerin (NITROSTAT) 0 4 mg SL tablet    Omega-3 Fatty Acids (FISH OIL) 1,000 mg    potassium chloride (K-DUR,KLOR-CON) 10 mEq tablet    Red Yeast Rice 600 MG CAPS    sertraline (ZOLOFT) 25 mg tablet        No Known Allergies    Objective   Vitals: Blood pressure 135/79, pulse 76, temperature 97 6 °F (36 4 °C), temperature source Oral, resp  rate 20, height 4' 11 75" (1 518 m), weight 93 3 kg (205 lb 11 oz), SpO2 95 %  ,Body mass index is 40 51 kg/m²  Physical Exam   Constitutional: She is oriented to person, place, and time  She appears well-developed and well-nourished  No distress  HENT:   Head: Normocephalic  Mouth/Throat: No oropharyngeal exudate  In c collar   Eyes: Conjunctivae and EOM are normal  No scleral icterus  Neck: Neck supple  Cardiovascular: Normal rate  Pulmonary/Chest: Effort normal and breath sounds normal  She has no wheezes  She has no rales  Abdominal: Soft  Bowel sounds are normal  She exhibits no distension  Musculoskeletal: She exhibits edema  Neurological: She is alert and oriented to person, place, and time  Skin: Skin is warm and dry  Psychiatric: She is not agitated and not actively hallucinating  Cognition and memory are not impaired  She exhibits normal recent memory and normal remote memory     Patient scores 4/5 on mini cog assessment which is normal  No signs of delirium  Patient has history of depression for which she takes Zoloft   Nursing note and vitals reviewed  Lab Results:   Results from last 7 days  Lab Units 01/10/19  0530   WBC Thousand/uL 9 36   HEMOGLOBIN g/dL 10 9*   HEMATOCRIT % 34 9   PLATELETS Thousands/uL 156        Results from last 7 days  Lab Units 01/10/19  0530 01/09/19 2049 01/09/19 2043   POTASSIUM mmol/L 4 7 4 4  < >  --    CHLORIDE mmol/L 104 102  < >  --    CO2 mmol/L 24 27  < >  --    CO2, I-STAT mmol/L  --   --   --  31   BUN mg/dL 22 25  < >  --    CREATININE mg/dL 1 00 1 12  < >  --    CALCIUM mg/dL 8 8 9 0  < >  --    ALK PHOS U/L  --  70  --   --    ALT U/L  --  38  --   --    AST U/L  --  36  --   --    GLUCOSE, ISTAT mg/dl  --   --   --  178*   < > = values in this interval not displayed  Imaging Studies: I have personally reviewed pertinent reports  EKG, Pathology, and Other Studies: I have personally reviewed pertinent reports      VTE Prophylaxis: Sequential compression device (Venodyne)     Code Status: Level 1 - Full Code

## 2019-01-10 NOTE — PROGRESS NOTES
Progress Note - Critical Care   Hurman Phoenix 80 y o  female MRN: [id number]  Unit/Bed#: [facility] 513-01 Encounter: [id number]    Assessment:  - Type III Odontoid fracture  - Nondisplaced C6 fracture  - HTN  - Hypothyroid  - elevated BNP    Plan:          Neuro:    - Type III odontoid fracture and nondisplaced C6 fracture    - Neurosurgery consulted    - ASPEN collar    - Cervical spine precautions    - Q1 hour neuro checks  No focal deficits              CV:    - HTN    - continue home Losartan, Lopressor and Amlodipine     - 1 dose of Hydralazine given overnight   - Hx of Stroke    - Hold plavix given possible surgical intervention    - B/L Leg edema    - ECHO 2016: EF 55% w/ grade 1 diastolic dysfunction     - BNP 7,347 - consider repeat ECHO                Lung:    - Sating 92-94% on RA  Diffuse wheezes, no acute respiratory distress   - Respiratory protocol    - R sided chest pain    - no apparent fracture on CXR (1/9)    - repeat CXR pending    - Pain control: Lidoderm patch, scheduled tylenol, prn oxycodone, dilaudid prn for breakthrough                 GI:    - Prn Zofran for nausea   - GI prophylaxis not needed  FEN:    - NS @ 75 ml/hr given NPO status   - Replete electrolytes prn   - NPO for possible operative management of C2 fx  Advance diet per neurosurgery                 :    - BUN/CR stable   - Increase Lasix to 40mg IV given b/l leg edema and elevated BNP - Voiding spontaneously   - Monitor I/Os                 ID:    - No acute issues  Monitor WBC                 Heme:    - SCDs for DVT prophylaxis    Hold Chemical ppx for possible OR                 Endo:    - Hypothyroid - Continue home levothyroxine                            Msk/Skin:    - R sided chest pain    - no apparent fracture on CXR    - Pain control: Lidoderm patch, scheduled tylenol, prn oxycodone, dilaudid prn for breakthrough    - Candidal rash to L breast and panus      - Continue Nystatin cream Disposition: ICU     Chief Complaint: Neck pain, L chest wall pain    HPI/24hr events: No acute events overnight  Given 1 dose of 10mg Hydralazine for HTN    Physical Exam: Physical Exam   Constitutional: She is oriented to person, place, and time  She appears well-developed and well-nourished  No distress  HENT:   Head: Normocephalic and atraumatic  Right Ear: External ear normal    Left Ear: External ear normal    Eyes: Pupils are equal, round, and reactive to light  Conjunctivae and EOM are normal  Right eye exhibits no discharge  Left eye exhibits no discharge  Neck:   C collar in place     Cardiovascular: Normal rate, regular rhythm, normal heart sounds and intact distal pulses  Exam reveals no gallop and no friction rub  No murmur heard  Pulmonary/Chest: Effort normal  No respiratory distress  She has wheezes  She has no rales  She exhibits tenderness (R sided)  Abdominal: Soft  Bowel sounds are normal  She exhibits no distension  There is no tenderness  There is no guarding  Musculoskeletal: Normal range of motion  She exhibits edema (b/l legs)  She exhibits no tenderness or deformity  5/5 Muscle strength in all extremities    Neurological: She is alert and oriented to person, place, and time  No cranial nerve deficit or sensory deficit  She exhibits normal muscle tone  Skin: Skin is warm  Rash (L breast and panus ) noted  Nursing note and vitals reviewed  Vitals:    01/10/19 0100 01/10/19 0200 01/10/19 0300 01/10/19 0400   BP: (!) 181/74 (!) 188/82 147/62 133/92   Pulse: 58 60 60 58   Resp: (!) 28 (!) 25 (!) 27 (!) 23   Temp:       TempSrc:       SpO2: 99% 99% 91% 93%   Weight:       Height:                 Temperature:   Temp (24hrs), Av 3 °F (36 8 °C), Min:97 6 °F (36 4 °C), Max:98 8 °F (37 1 °C)    Current: Temperature: 97 6 °F (36 4 °C)    Weights:   IBW: 11 kg    Body mass index is 71 42 kg/m²    Weight (last 2 days)     Date/Time   Weight    19 2244  93 3 (205 69) Hemodynamic Monitoring:  N/A     Non-Invasive/Invasive Ventilation Settings:  Respiratory    Lab Data (Last 4 hours)    None         O2/Vent Data (Last 4 hours)    None              No results found for: PHART, YLI5RWZ, PO2ART, FIR2EWZ, K2YLHZFA, BEART, SOURCE  SpO2: SpO2: 93 %    Intake and Outputs:  I/O       01/08 0701 - 01/09 0700 01/09 0701 - 01/10 0700    P  O   150    I V  (mL/kg)  478 3 (5 1)    Total Intake(mL/kg)  628 3 (6 7)    Urine (mL/kg/hr)  200    Total Output   200    Net   +428 3                Nutrition:        Diet Orders            Start     Ordered    01/09/19 2118  Diet NPO  Diet effective now     Question Answer Comment   Diet Type NPO    RD to adjust diet per protocol? Yes        01/09/19 2124            Labs:     Results from last 7 days  Lab Units 01/09/19 2049 01/09/19 2043   WBC Thousand/uL 8 84  --    HEMOGLOBIN g/dL 11 8  --    I STAT HEMOGLOBIN g/dl  --  12 2   HEMATOCRIT % 37 5  --    HEMATOCRIT, ISTAT %  --  36   PLATELETS Thousands/uL 183  --    NEUTROS PCT % 63  --    MONOS PCT % 6  --       Results from last 7 days  Lab Units 01/09/19 2049 01/09/19 2043   SODIUM mmol/L 137  --    POTASSIUM mmol/L 4 4  --    CHLORIDE mmol/L 102  --    CO2 mmol/L 27  --    CO2, I-STAT mmol/L  --  31   BUN mg/dL 25  --    CREATININE mg/dL 1 12  --    CALCIUM mg/dL 9 0  --    ALK PHOS U/L 70  --    ALT U/L 38  --    AST U/L 36  --    GLUCOSE, ISTAT mg/dl  --  178*                      No results found for: TROPONINI    Imaging:  I have personally reviewed pertinent reports        EKG: I have personally reviewed pertinent reports    Micro:  No results found for: James Edison, WOUNDCULT, SPUTUMCULTUR    Allergies: No Known Allergies    Medications:   Scheduled Meds:  Current Facility-Administered Medications:  acetaminophen 650 mg Oral Q6H [facility] Azeem Lyons,     albuterol 2 puff Inhalation Q4H PRN Radonna Press, DO    albuterol 2 5 mg Nebulization Q6H PRN Radonna Press, DO amLODIPine 5 mg Oral Daily Daniella Addison, DO    chlorhexidine 15 mL Swish & Spit Q12H [address] Daniella Addison, DO    furosemide 20 mg Oral Daily Daniella Addison, DO    gabapentin 100 mg Oral HS Daniella Addison, DO    HYDROmorphone 0 5 mg Intravenous Q4H PRN AZIZA Gonzalez-C    levothyroxine 112 mcg Oral Early Morning Daniella Addison, DO    lidocaine 1 patch Topical Once Daniella Addison, DO    LORazepam 0 5 mg Oral Q6H PRN Daniella Addison, DO    losartan 100 mg Oral Daily Daniella Addison, DO    metoprolol tartrate 25 mg Oral Q12H [address] Daniella Addison, DO    nystatin  Topical BID Daniella Addison, DO    ondansetron 4 mg Intravenous Q4H PRN Daniella Addison, DO    oxyCODONE 2 5 mg Oral Q4H PRN Des Hamilton PA-C    oxyCODONE 5 mg Oral Q4H PRN Des Hamilton PA-C    sodium chloride 100 mL/hr Intravenous Continuous Daniella Addison,  Last Rate: 100 mL/hr (01/09/19 2313)     Continuous Infusions:  sodium chloride 100 mL/hr Last Rate: 100 mL/hr (01/09/19 2313)     PRN Meds:    albuterol 2 puff Q4H PRN   albuterol 2 5 mg Q6H PRN   HYDROmorphone 0 5 mg Q4H PRN   LORazepam 0 5 mg Q6H PRN   ondansetron 4 mg Q4H PRN   oxyCODONE 2 5 mg Q4H PRN   oxyCODONE 5 mg Q4H PRN       VTE Pharmacologic Prophylaxis: Sequential compression device (Venodyne)   VTE Mechanical Prophylaxis: reason for no mechanical VTE prophylaxis possible OR    Invasive lines and devices: Invasive Devices     Peripheral Intravenous Line            Peripheral IV 01/09/19 Left Antecubital less than 1 day    Peripheral IV 01/09/19 Left Hand less than 1 day                   Counseling / Coordination of Care  Total Critical Care time spent 30 minutes excluding procedures, teaching and family updates  Code Status: Level 1 - Full Code     Portions of the record may have been created with voice recognition software  Occasional wrong word or "sound a like" substitutions may have occurred due to the inherent limitations of voice recognition software    Read the chart carefully and recognize, using context, where substitutions have occurred       Mika Foreman, DO

## 2019-01-10 NOTE — PROGRESS NOTES
Progress Note - Tertiary Trauma Survery   Mojgan Solano 80 y o  female MRN: [id number]  Unit/Bed#: [facility] 513-01 Encounter: [id number]    Summary of Diagnosed Injuries: Type III Odontoid Fx, Nondisplaced C6 Fx    Clinical Plan: ASPEN collar    Bedside nurse rounds completed with nurse     Prophylaxis: Sequential compression device Zollie Buckingham)     Disposition: Med/Surg    Code status:  Level 1 - Full Code    Consultants: Neurosurgery   Is the patient 65 years or older?: YES:    1  Before the illness or injury that brought you to the Emergency, did you need someone to help you on a regular basis? 1=Yes   2  Since the illness or injury that brought you to the Emergency, have you needed more help than usual to take care of yourself? 1=Yes   3  Have you been hospitalized for one or more nights during the past 6 months (excluding a stay in the Emergency Department)? 0=No   4  In general, do you see well? 0=Yes   5  In general, do you have serious problems with your memory? 0=No   6  Do you take more than three different medications everyday? 1=Yes   TOTAL   3     Did you order a geriatric consult if the score was 2 or greater?: yes    SUBJECTIVE:     Transfer from: n/a  Outside Films Received: not applicable  Tertiary Exam Due on: 1/10/19    Mechanism of Injury:Other: Mechanical fall    Details related to Injury: +LOC:  no    Chief Complaint: Neck Pain, R chest wall pain    HPI/Last 24 hour events: 81 yo F who presented as level B trauma for mechanical fall with headstrike on plavix   Patient found to have Type III Odontoid Fx, Nondisplaced C6 fx     Active medications:           Current Facility-Administered Medications:     acetaminophen (TYLENOL) tablet 650 mg, 650 mg, Oral, Q6H KARINE, 650 mg at 01/10/19 0504    albuterol (PROVENTIL HFA,VENTOLIN HFA) inhaler 2 puff, 2 puff, Inhalation, Q4H PRN    albuterol inhalation solution 2 5 mg, 2 5 mg, Nebulization, Q6H PRN    amLODIPine (NORVASC) tablet 5 mg, 5 mg, Oral, Daily, 5 mg at 01/10/19 0807    chlorhexidine (PERIDEX) 0 12 % oral rinse 15 mL, 15 mL, Swish & Spit, Q12H [facility], 15 mL at 01/10/19 0807    furosemide (LASIX) injection 20 mg, 20 mg, Intravenous, Once    gabapentin (NEURONTIN) capsule 100 mg, 100 mg, Oral, HS, 100 mg at 01/09/19 2308    HYDROmorphone (DILAUDID) injection 0 5 mg, 0 5 mg, Intravenous, Q4H PRN, 0 5 mg at 01/10/19 0551    levothyroxine tablet 112 mcg, 112 mcg, Oral, Early Morning, 112 mcg at 01/10/19 0504    lidocaine (LIDODERM) 5 % patch 1 patch, 1 patch, Topical, Daily    LORazepam (ATIVAN) tablet 0 5 mg, 0 5 mg, Oral, Q6H PRN, 0 5 mg at 01/10/19 0913    losartan (COZAAR) tablet 100 mg, 100 mg, Oral, Daily, 100 mg at 01/10/19 0807    metoprolol tartrate (LOPRESSOR) tablet 25 mg, 25 mg, Oral, Q12H KARINE, 25 mg at 01/09/19 2308    nystatin (MYCOSTATIN) cream, , Topical, BID    ondansetron (ZOFRAN) injection 4 mg, 4 mg, Intravenous, Q4H PRN, 4 mg at 01/10/19 0924    oxyCODONE (ROXICODONE) IR tablet 2 5 mg, 2 5 mg, Oral, Q4H PRN, 2 5 mg at 01/10/19 0913    oxyCODONE (ROXICODONE) IR tablet 5 mg, 5 mg, Oral, Q4H PRN      OBJECTIVE:     Vitals:   Vitals:    01/10/19 0830   BP:    Pulse: 56   Resp: 20   Temp:    SpO2: 93%       Physical Exam:   GENERAL APPEARANCE: Well appearing, no acute distress  NEURO: AAO x3, sensation grossly intact, 5/5 muscle strength in all extremities  HEENT: Normocephalic, Atraumatic  CV: RRR, no murmurs  LUNGS: CTA b/l  GI: Soft, non distended, no tenderness  : No acute issues  MSK: R chest wall pain  SKIN: Rash to L breast and panus      I/O:   I/O       01/08 0701 - 01/09 0700 01/09 0701 - 01/10 0700 01/10 0701 - 01/11 0700    P  O   150     I V  (mL/kg)  478 3 (5 1)     Total Intake(mL/kg)  628 3 (6 7)     Urine (mL/kg/hr)  200     Total Output   200      Net   +428 3             Unmeasured Urine Occurrence  1 x           Invasive Devices:    Invasive Devices     Peripheral Intravenous Line            Peripheral IV 01/09/19 Left Antecubital less than 1 day    Peripheral IV 01/09/19 Left Hand less than 1 day                  Imaging:   Trauma - Ct Head Wo Contrast    Result Date: 1/9/2019  Impression: No acute intracranial abnormality  Workstation performed: [id number]     Cta Neck W Wo Contrast    Result Date: 1/9/2019  Impression: No evidence of aneurysm or dissection  Type 3 dens fracture with fracture component extending to the left C2 transverse foramen  Nondisplaced fracture anterior T6 bridging osteophyte  Areas of atherosclerotic plaque narrowing as described above  Workstation performed: [id number]     Trauma - Ct Spine Cervical Wo Contrast    Result Date: 1/9/2019  Impression: 1  Type III odontoid fracture  The fracture line extends to the right and left superior articular facets and left transverse foramen of C2  2   Acute nondisplaced fracture through bulky, bridging osteophytes at the C6 level  I personally discussed this study with Deanna Dancer on 1/9/2019 at 8:59 PM   Workstation performed: [id number]     Xr Trauma Multiple    Result Date: 1/10/2019  Impression: Borderline enlargement of cardiac silhouette  Status post median sternotomy  No acute pulmonary disease  Workstation performed: [id number]     Xr Chest Portable Icu    Result Date: 1/10/2019  Impression: 1  Bibasilar opacities compatible with effusions and a component of atelectasis or infection  2   Stable mild enlargement of cardiac silhouette   Workstation performed: [id number]       Labs:   CBC:   Lab Results   Component Value Date    WBC 9 36 01/10/2019    HGB 10 9 (L) 01/10/2019    HCT 34 9 01/10/2019     (H) 01/10/2019     01/10/2019    MCH 31 8 01/10/2019    MCHC 31 2 (L) 01/10/2019    RDW 13 2 01/10/2019    MPV 9 2 01/10/2019    NRBC 0 01/10/2019     CMP:   Lab Results   Component Value Date     01/10/2019    CO2 24 01/10/2019    CO2 31 01/09/2019    BUN 22 01/10/2019    CREATININE 1 00 01/10/2019    GLUCOSE 178 (H) 01/09/2019    CALCIUM 8 8 01/10/2019    AST 36 01/09/2019    ALT 38 01/09/2019    ALKPHOS 70 01/09/2019    EGFR 52 01/10/2019     Phosphorus: No results found for: PHOS  Magnesium: No results found for: MG  Urinalysis: No results found for: COLORU, CLARITYU, SPECGRAV, PHUR, LEUKOCYTESUR, NITRITE, PROTEINUA, GLUCOSEU, KETONESU, BILIRUBINUR, BLOODU  Coagulation:   Lab Results   Component Value Date    INR 1 02 01/10/2019

## 2019-01-11 ENCOUNTER — APPOINTMENT (INPATIENT)
Dept: RADIOLOGY | Facility: HOSPITAL | Age: 84
DRG: 552 | End: 2019-01-11
Payer: COMMERCIAL

## 2019-01-11 PROBLEM — R41.0 DELIRIUM: Status: ACTIVE | Noted: 2019-01-11

## 2019-01-11 LAB
ANION GAP SERPL CALCULATED.3IONS-SCNC: 7 MMOL/L (ref 4–13)
BUN SERPL-MCNC: 20 MG/DL (ref 5–25)
CALCIUM SERPL-MCNC: 9.3 MG/DL (ref 8.3–10.1)
CHLORIDE SERPL-SCNC: 98 MMOL/L (ref 100–108)
CO2 SERPL-SCNC: 29 MMOL/L (ref 21–32)
CREAT SERPL-MCNC: 0.97 MG/DL (ref 0.6–1.3)
ERYTHROCYTE [DISTWIDTH] IN BLOOD BY AUTOMATED COUNT: 13.4 % (ref 11.6–15.1)
FOLATE SERPL-MCNC: >20 NG/ML (ref 3.1–17.5)
GFR SERPL CREATININE-BSD FRML MDRD: 54 ML/MIN/1.73SQ M
GLUCOSE SERPL-MCNC: 92 MG/DL (ref 65–140)
HCT VFR BLD AUTO: 31.6 % (ref 34.8–46.1)
HGB BLD-MCNC: 10.1 G/DL (ref 11.5–15.4)
MAGNESIUM SERPL-MCNC: 2.1 MG/DL (ref 1.6–2.6)
MCH RBC QN AUTO: 32.2 PG (ref 26.8–34.3)
MCHC RBC AUTO-ENTMCNC: 32 G/DL (ref 31.4–37.4)
MCV RBC AUTO: 101 FL (ref 82–98)
PLATELET # BLD AUTO: 153 THOUSANDS/UL (ref 149–390)
PMV BLD AUTO: 9.8 FL (ref 8.9–12.7)
POTASSIUM SERPL-SCNC: 3.7 MMOL/L (ref 3.5–5.3)
RBC # BLD AUTO: 3.14 MILLION/UL (ref 3.81–5.12)
SODIUM SERPL-SCNC: 134 MMOL/L (ref 136–145)
VIT B12 SERPL-MCNC: 622 PG/ML (ref 100–900)
WBC # BLD AUTO: 6.58 THOUSAND/UL (ref 4.31–10.16)

## 2019-01-11 PROCEDURE — 99233 SBSQ HOSP IP/OBS HIGH 50: CPT | Performed by: NURSE PRACTITIONER

## 2019-01-11 PROCEDURE — G8988 SELF CARE GOAL STATUS: HCPCS

## 2019-01-11 PROCEDURE — 80048 BASIC METABOLIC PNL TOTAL CA: CPT | Performed by: NURSE PRACTITIONER

## 2019-01-11 PROCEDURE — 94760 N-INVAS EAR/PLS OXIMETRY 1: CPT

## 2019-01-11 PROCEDURE — G8987 SELF CARE CURRENT STATUS: HCPCS

## 2019-01-11 PROCEDURE — 85027 COMPLETE CBC AUTOMATED: CPT | Performed by: NURSE PRACTITIONER

## 2019-01-11 PROCEDURE — 82607 VITAMIN B-12: CPT | Performed by: PHYSICIAN ASSISTANT

## 2019-01-11 PROCEDURE — 94640 AIRWAY INHALATION TREATMENT: CPT

## 2019-01-11 PROCEDURE — 83735 ASSAY OF MAGNESIUM: CPT | Performed by: NURSE PRACTITIONER

## 2019-01-11 PROCEDURE — 99233 SBSQ HOSP IP/OBS HIGH 50: CPT | Performed by: NEUROLOGICAL SURGERY

## 2019-01-11 PROCEDURE — 99232 SBSQ HOSP IP/OBS MODERATE 35: CPT | Performed by: SURGERY

## 2019-01-11 PROCEDURE — 97167 OT EVAL HIGH COMPLEX 60 MIN: CPT

## 2019-01-11 PROCEDURE — 82746 ASSAY OF FOLIC ACID SERUM: CPT | Performed by: PHYSICIAN ASSISTANT

## 2019-01-11 PROCEDURE — 72040 X-RAY EXAM NECK SPINE 2-3 VW: CPT

## 2019-01-11 RX ORDER — HYDRALAZINE HYDROCHLORIDE 20 MG/ML
5 INJECTION INTRAMUSCULAR; INTRAVENOUS EVERY 6 HOURS PRN
Status: DISCONTINUED | OUTPATIENT
Start: 2019-01-11 | End: 2019-01-17 | Stop reason: HOSPADM

## 2019-01-11 RX ORDER — OXYCODONE HYDROCHLORIDE 5 MG/1
2.5 TABLET ORAL EVERY 4 HOURS PRN
Status: DISCONTINUED | OUTPATIENT
Start: 2019-01-11 | End: 2019-01-17 | Stop reason: HOSPADM

## 2019-01-11 RX ORDER — HYDROMORPHONE HCL/PF 1 MG/ML
0.2 SYRINGE (ML) INJECTION ONCE
Status: COMPLETED | OUTPATIENT
Start: 2019-01-11 | End: 2019-01-11

## 2019-01-11 RX ORDER — AMLODIPINE BESYLATE 10 MG/1
10 TABLET ORAL DAILY
Status: DISCONTINUED | OUTPATIENT
Start: 2019-01-12 | End: 2019-01-11

## 2019-01-11 RX ORDER — POLYETHYLENE GLYCOL 3350 17 G/17G
17 POWDER, FOR SOLUTION ORAL DAILY PRN
Status: DISCONTINUED | OUTPATIENT
Start: 2019-01-11 | End: 2019-01-17 | Stop reason: HOSPADM

## 2019-01-11 RX ORDER — AMLODIPINE BESYLATE 5 MG/1
5 TABLET ORAL ONCE
Status: DISCONTINUED | OUTPATIENT
Start: 2019-01-11 | End: 2019-01-11

## 2019-01-11 RX ORDER — IPRATROPIUM BROMIDE AND ALBUTEROL SULFATE 2.5; .5 MG/3ML; MG/3ML
3 SOLUTION RESPIRATORY (INHALATION) ONCE
Status: COMPLETED | OUTPATIENT
Start: 2019-01-11 | End: 2019-01-11

## 2019-01-11 RX ORDER — AMLODIPINE BESYLATE 5 MG/1
5 TABLET ORAL DAILY
Status: DISCONTINUED | OUTPATIENT
Start: 2019-01-12 | End: 2019-01-13

## 2019-01-11 RX ORDER — HYDROXYCHLOROQUINE SULFATE 200 MG/1
200 TABLET, FILM COATED ORAL
Status: DISCONTINUED | OUTPATIENT
Start: 2019-01-12 | End: 2019-01-17 | Stop reason: HOSPADM

## 2019-01-11 RX ORDER — FUROSEMIDE 20 MG/1
20 TABLET ORAL DAILY
Status: DISCONTINUED | OUTPATIENT
Start: 2019-01-11 | End: 2019-01-17 | Stop reason: HOSPADM

## 2019-01-11 RX ORDER — SENNOSIDES 8.6 MG
1 TABLET ORAL
Status: DISCONTINUED | OUTPATIENT
Start: 2019-01-11 | End: 2019-01-17 | Stop reason: HOSPADM

## 2019-01-11 RX ORDER — LIDOCAINE 50 MG/G
1 PATCH TOPICAL DAILY
Status: DISCONTINUED | OUTPATIENT
Start: 2019-01-11 | End: 2019-01-17 | Stop reason: HOSPADM

## 2019-01-11 RX ADMIN — Medication 1000 MG: at 08:25

## 2019-01-11 RX ADMIN — HYDRALAZINE HYDROCHLORIDE 5 MG: 20 INJECTION INTRAMUSCULAR; INTRAVENOUS at 23:27

## 2019-01-11 RX ADMIN — AMLODIPINE BESYLATE 5 MG: 5 TABLET ORAL at 08:26

## 2019-01-11 RX ADMIN — ACETAMINOPHEN 975 MG: 325 TABLET ORAL at 05:22

## 2019-01-11 RX ADMIN — OXYCODONE HYDROCHLORIDE 2.5 MG: 5 TABLET ORAL at 11:06

## 2019-01-11 RX ADMIN — ACETAMINOPHEN 975 MG: 325 TABLET ORAL at 12:50

## 2019-01-11 RX ADMIN — SERTRALINE HYDROCHLORIDE 25 MG: 25 TABLET ORAL at 21:52

## 2019-01-11 RX ADMIN — DOCUSATE SODIUM 100 MG: 100 CAPSULE, LIQUID FILLED ORAL at 08:26

## 2019-01-11 RX ADMIN — METOPROLOL TARTRATE 25 MG: 25 TABLET, FILM COATED ORAL at 08:25

## 2019-01-11 RX ADMIN — CALCIUM CARBONATE 500 MG (1,250 MG)-VITAMIN D3 200 UNIT TABLET 1 TABLET: at 08:25

## 2019-01-11 RX ADMIN — GABAPENTIN 300 MG: 300 CAPSULE ORAL at 08:26

## 2019-01-11 RX ADMIN — METOPROLOL TARTRATE 25 MG: 25 TABLET, FILM COATED ORAL at 21:54

## 2019-01-11 RX ADMIN — LOSARTAN POTASSIUM 100 MG: 50 TABLET, FILM COATED ORAL at 08:25

## 2019-01-11 RX ADMIN — CALCIUM CARBONATE 500 MG (1,250 MG)-VITAMIN D3 200 UNIT TABLET 1 TABLET: at 17:39

## 2019-01-11 RX ADMIN — HEPARIN SODIUM 5000 UNITS: 5000 INJECTION INTRAVENOUS; SUBCUTANEOUS at 05:22

## 2019-01-11 RX ADMIN — LIDOCAINE 1 PATCH: 50 PATCH CUTANEOUS at 11:26

## 2019-01-11 RX ADMIN — OXYCODONE HYDROCHLORIDE 2.5 MG: 5 TABLET ORAL at 21:53

## 2019-01-11 RX ADMIN — DOCUSATE SODIUM 100 MG: 100 CAPSULE, LIQUID FILLED ORAL at 17:39

## 2019-01-11 RX ADMIN — LEVOTHYROXINE SODIUM 112 MCG: 112 TABLET ORAL at 05:23

## 2019-01-11 RX ADMIN — NYSTATIN: 100000 CREAM TOPICAL at 08:26

## 2019-01-11 RX ADMIN — OXYCODONE HYDROCHLORIDE 2.5 MG: 5 TABLET ORAL at 23:32

## 2019-01-11 RX ADMIN — HYDROMORPHONE HYDROCHLORIDE 0.2 MG: 1 INJECTION, SOLUTION INTRAMUSCULAR; INTRAVENOUS; SUBCUTANEOUS at 12:59

## 2019-01-11 RX ADMIN — ACETAMINOPHEN 975 MG: 325 TABLET ORAL at 21:52

## 2019-01-11 RX ADMIN — CLOPIDOGREL 75 MG: 75 TABLET, FILM COATED ORAL at 08:25

## 2019-01-11 RX ADMIN — HEPARIN SODIUM 5000 UNITS: 5000 INJECTION INTRAVENOUS; SUBCUTANEOUS at 21:55

## 2019-01-11 RX ADMIN — NYSTATIN 1 APPLICATION: 100000 CREAM TOPICAL at 19:43

## 2019-01-11 RX ADMIN — GABAPENTIN 600 MG: 300 CAPSULE ORAL at 21:52

## 2019-01-11 RX ADMIN — SENNOSIDES 8.6 MG: 8.6 TABLET, FILM COATED ORAL at 22:00

## 2019-01-11 RX ADMIN — SENNOSIDES 8.6 MG: 8.6 TABLET, FILM COATED ORAL at 12:21

## 2019-01-11 RX ADMIN — FUROSEMIDE 20 MG: 20 TABLET ORAL at 12:21

## 2019-01-11 RX ADMIN — IPRATROPIUM BROMIDE AND ALBUTEROL SULFATE 3 ML: 2.5; .5 SOLUTION RESPIRATORY (INHALATION) at 12:57

## 2019-01-11 NOTE — SOCIAL WORK
Cm reviewed patient during care coordination rounds  Cm informed patient not ready for discharge at this time  Cm informed by therapies patient is being recommended for inpatient rehab  Cm met with patient's family at bedside  Family requesting referral to [facility]  Subacute rehab choices left at bedside for family review  Awaiting [facility] determination and subacute choices

## 2019-01-11 NOTE — PROGRESS NOTES
Pt complains of severe right forehead pain, following adjustment of cervical collar  Previously medicated for pain with oxycodone  Notified trauma PA of same  Orders placed for dilaudid one time dose

## 2019-01-11 NOTE — OCCUPATIONAL THERAPY NOTE
[address] Evaluation     Patient Name: Patrica Scott  Today's Date: 1/11/2019  Problem List  Patient Active Problem List   Diagnosis    Closed nondisplaced fracture of second cervical vertebra ([facility] )    Neck pain, acute    Type III fracture of odontoid process ([facility] )    C6 cervical fracture (HCC)    Hypertension    Hypothyroid    Fall    Forgetfulness    Ambulatory dysfunction    Physical deconditioning    Acute pain     Past Medical History  Past Medical History:   Diagnosis Date    [facility]congest[facility] failure) ([facility] )     Hypertension     Stroke [facility])      Past Surgical History  History reviewed  No pertinent surgical history  01/11/19 0930   Note Type   Note type Eval/Treat   Restrictions/Precautions   Braces or Orthoses C/S Collar   Other Precautions Cognitive; Chair Alarm; Bed Alarm;Multiple lines; Fall Risk;Pain;Spinal precautions   Pain Assessment   Pain Assessment 0-10   Pain Score 3   Pain Location Neck   Patient's Stated Pain Goal No pain   Hospital Pain Intervention(s) Repositioned; Ambulation/increased activity; Distraction; Emotional support   Response to Interventions INCREASED WITH ACTIVITY    Home Living   Type of [address] [place] [address] One level;Ramped entrance   Bathroom Shower/Tub Walk-in shower   Bathroom Toilet Standard   Bathroom Equipment Built-in shower seat;Grab bars in [address]   Additional Comments PT REPORTS USE OF B/L SPC PTA  USE OF GRAB BARS IN SHOWER    Prior Function   Level of [place] Independent with ADLs and functional mobility   Lives With Family   Receives Help From Family   ADL Assistance Independent   IADLs Needs assistance   Falls in the last 6 months 1 to 4   Vocational Retired   Lifestyle   [facility] PT [address] AS NEEDED  PT SLEEPS IN RECLINER AT HOME      Reciprocal Relationships LIVES WITH SUPPORTIVE FAMILY    Service to Others RETIRED Intrinsic Gratification ENJOYS SPENDING TIME WITH FAMILY    ADL   Eating Assistance 4  Minimal Assistance   Grooming Assistance 4  Minimal Assistance   UB Bathing Assistance 3  Moderate Assistance   LB Bathing Assistance 2  Maximal Assistance   UB Dressing Assistance 3  Moderate Assistance   LB Dressing Assistance 2  Maximal Assistance   Toileting Assistance  2  Maximal Assistance   Functional Assistance 2  Maximal Assistance   Bed Mobility   Supine to Sit 3  Moderate assistance   Additional items Assist x 1; Increased time required;Verbal cues;HOB elevated   Sit to Supine Unable to assess  (PT LEFT OOB WITH ALARM ON )   Transfers   Sit to Stand 4  Minimal assistance   Additional items Assist x 1; Increased time required;Verbal cues   Stand to Sit 4  Minimal assistance   Additional items Assist x 1; Increased time required;Verbal cues   Functional Mobility   Functional Mobility 3  Moderate assistance   Additional items Rolling walker   Balance   Static Sitting Fair   Static Standing Fair -   Ambulatory Poor +   Activity Tolerance   Activity Tolerance Patient limited by fatigue;Patient limited by pain   Medical Staff Made Aware SPOKE TO CM REGARDING D/C PLAN    Nurse Made Aware APPROPRIATE TO SEE    RUE Assessment   RUE Assessment WFL   RUE Strength   RUE Overall Strength Within Functional Limits - able to perform ADL tasks with strength   LUE Assessment   LUE Assessment WFL   LUE Strength   LUE Overall Strength Within Functional Limits - able to perform ADL tasks with strength   Cognition   Overall Cognitive Status Impaired   Arousal/Participation Alert; Cooperative   Attention Attends with cues to redirect   Orientation Level Oriented X4   Memory Decreased recall of precautions;Decreased recall of recent events;Decreased short term memory   Following Commands Follows one step commands without difficulty   Comments PT IS PLEASANT AND COOPERATIVE  LIMITED INSIGHT/JUDGEMENT/SAFETY AWARENESS   LIMITED RECALL OF LEARNED PRECAUTIONS  MILDLY ANXIOUS AT TIMES  RECOMMEND ALARM ON PT FOR SAFETY  Assessment   Limitation Decreased ADL status; Decreased Safe judgement during ADL;Decreased cognition;Decreased endurance;Decreased self-care trans;Decreased high-level ADLs   Prognosis Good   Assessment 81 YO Female SEEN FOR INITIAL OCCUPATIONAL THERAPY EVALUATION FOLLOWING ADMISSION TO [facility] S/P FALL RESULTING IN TYPE 3 ODONTOID FX WITH EXTENSION TO L SUPERVISOR ARTICULAR FACET AND TRANSVERSE FORAMEN OF C2  PT CURRENTLY HAS THE FOLLOWING RESTRICTIONS;SPINAL PRECAUTIONS and C-COLLAR   PROBLEMS LIST INCLUDES DM, CHF, CVA, HTN, FREQUENT FALLS AND FORGETFULNESS  PT IS FROM AN [place] SUITE OFF FAMILY'S HOME   WHERE SHE REPORTS BEING INDEPENDENT WITH ADLS AND HAS ASSIST FOR IADLS PTA  PT CURRENTLY REQUIRES OVERALL MOD-MAX A WITH ADLS, MIN A WITH TRANSFERS AND MOD A WITH LIMITED FUNCTIONAL MOBILITY WITH USE OF RW + CUES FOR SEQUENCING/SAFETY T/O  PT IS LIMITED 2' PAIN, FATIGUE, IMPAIRED BALANCE, FALL RISK , LIMITED SAFETY AWARENESS/INSIGHT/JUDGEMENT, SPINAL PRECAUTIONS, OVERALL WEAKNESS/DECONDITIONING , DIZZINESS WITH CHANGE OF POSITIONING  and OVERALL LIMITED ACTIVITY TOLERANCE  PT EDUCATED ON SPINAL PRECAUTIONS, DEEP BREATHING TECHNIQUES T/O ACTIVITY and CONTINUE PARTICIPATION IN SELF-CARE/MOBILITY WITH STAFF WHILE IN THE HOSPITAL   FROM AN OCCUPATIONAL THERAPY PERSPECTIVE, PT WOULD BENEFIT FROM ADDITIONAL OT SERVICES IN AN INPT REHAB SETTING UPON D/C  WILL CONT TO FOLLOW TO ADDRESS THE BELOW DESCRIBED GOALS  Goals   Patient Goals TO HAVE LESS PAIN    LTG Time Frame 10-14   Long Term Goal #1 SEE BELOW    Plan   Treatment Interventions ADL retraining;Functional transfer training; Endurance training;Cognitive reorientation;Patient/family training;Equipment evaluation/education; Compensatory technique education; Energy conservation; Activityengagement   Goal Expiration Date 01/25/19   OT Frequency 3-5x/wk   Recommendation   OT Discharge Recommendation Short Term Rehab   OT - OK to Discharge Yes   Barthel Index   Feeding 5   Bathing 0   Grooming Score 0   Dressing Score 5   Bladder Score 5   Bowels Score 10   Toilet Use Score 5   Transfers (Bed/Chair) Score 10   Mobility (Level Surface) Score 0   Stairs Score 0   Barthel Index Score 40   Modified [place] Scale   Modified [place] Scale 4         OCCUPATIONAL THERAPY GOALS TO BE MET WITHIN 10-14 DAYS:    -Pt will complete additional cognitive assessment with 100% attention to task in order to assist with safe d/c plan  -Pt will follow 100% simple 2-step commands and be A&O x4 consistently with environmental cues to increase participation in functional activities  -Pt will improve functional mobility and transfers to MOD I on/off all surfaces w/ G balance/safety including toileting   -Pt will increase independence in all ADLS to MOD I with G balance sitting upright in chair   -Pt will demonstrate G recall and carry over of learned spinal precautions when completing c-collar management with MIN A from caregiver    -Pt will improve activity tolerance to G for 30 min txment sessions w/ G carry over of learned energy conservation techniques   -Pt will improve independence in lt homemaking activities to MOD I without requiring cues for safety   -Pt will demonstrate G carryover of learned safety techniques and proper body mechanics in functional and leisure activities with use of DME      Documentation completed by Toribio Ayon, JUNG, OTR/L

## 2019-01-11 NOTE — PROGRESS NOTES
Progress Note - Leana Christianson 1934, 80 y o  female MRN: [id number]    Unit/Bed#: [facility] 634-01 Encounter: [id number]    Primary Care Provider: Desire Lentz DO   Date and time admitted to hospital: 1/9/2019  8:31 PM        Delirium   Assessment & Plan    - delirium overnight, resolved in the day time  - delirium precautions  - declined melatonin      Hypertension   Assessment & Plan    - daughter reports that she is very sensitive to hypertension and 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 pressures  Will resume home lasix today as she has some wheezing and a few crackles noted on exam  Monitor response  Could consider increasing norvasc to 10 mg dosing if continues to have HTN       Closed nondisplaced fracture of second cervical vertebra (HCC)   Assessment & Plan    - cervical collar per neurosurgery recs  - upright Xrays today  - analgesia: pt reporting she takes hydrocodone at home  I explained oxycodone is preferred in geriatric population related to side effect profile and to optimize tylenol use and minimize narcotics (with 2 5 mg dosing available)  She is agreeable to try this method and daughter is requesting to try 2 5 with additional 2 5 available as breakthrough  - neuro intact    - PT/OT recommending rehab, CM assisting with placement                Subjective/Objective   Chief Complaint: "I have a lot of pain in my neck  I was confused last night also "    Subjective: rports neck pain, denies headache, admits to intermittent nausea yesterday and intermittent dizziness  Denies SOB or palpations  Admits to mild wheezing/chest congestion   Denies constipation    Objective:     Meds/Allergies   Prescriptions Prior to Admission   Medication Sig Dispense Refill Last Dose    albuterol (PROVENTIL HFA,VENTOLIN HFA) 90 mcg/act inhaler Inhale 2 puffs every 4 (four) hours as needed for wheezing       amLODIPine (NORVASC) 5 mg tablet Take 5 mg by mouth daily       calcium carbonate-vitamin D (OSCAL-D) 500 mg-200 units per tablet Take 1 tablet by mouth 2 (two) times a day with meals         calcium-vitamin D 250-100 MG-UNIT per tablet Take 1 tablet by mouth 2 (two) times a day       clopidogrel (PLAVIX) 75 mg tablet Take 75 mg by mouth daily       furosemide (LASIX) 20 mg tablet Take 20 mg by mouth daily       gabapentin (NEURONTIN) 300 mg capsule Take 300 mg by mouth 2 (two) times a day 1 CAPSULE IN AM (300MG) AND 2 CAPS AT HS (600MG)        HYDROcodone-acetaminophen (NORCO) 5-325 mg per tablet Take 1 tablet by mouth every 6 (six) hours as needed for pain       hydroxychloroquine (PLAQUENIL) 200 mg tablet Take 200 mg by mouth daily with breakfast       Levothyroxine Sodium 112 MCG CAPS Take by mouth daily       lidocaine (LIDODERM) 5 % Apply 2 patches topically daily Remove & Discard patch within 12 hours or as directed by MD         LORazepam (ATIVAN) 0 5 mg tablet Take 0 5 mg by mouth 2 (two) times a day as needed for anxiety         losartan (COZAAR) 100 MG tablet Take 100 mg by mouth daily       LOSARTAN POTASSIUM-HCTZ PO Take by mouth       metoprolol tartrate (LOPRESSOR) 25 mg tablet Take 25 mg by mouth every 12 (twelve) hours       Multiple Vitamins-Calcium (ONE-A-DAY WOMENS PO) Take by mouth       nitroglycerin (NITROSTAT) 0 4 mg SL tablet Place 0 4 mg under the tongue every 5 (five) minutes as needed for chest pain       Omega-3 Fatty Acids (FISH OIL) 1,000 mg Take 1,000 mg by mouth daily       potassium chloride (K-DUR,KLOR-CON) 10 mEq tablet Take 10 mEq by mouth 2 (two) times a day       Red Yeast Rice 600 MG CAPS Take by mouth daily       sertraline (ZOLOFT) 25 mg tablet Take 25 mg by mouth daily          Vitals: Blood pressure (!) 175/60, pulse (!) 54, temperature (!) 97 3 °F (36 3 °C), resp  rate 18, height 4' 11 75" (1 518 m), weight 93 3 kg (205 lb 11 oz), SpO2 93 %  Body mass index is 40 51 kg/m²   SpO2: SpO2: 93 %    ABG: No results found for: PHART, SKD8OCF, PO2ART, KHD4FMU, M1TDCWDH, BEART, SOURCE      Intake/Output Summary (Last 24 hours) at 01/11/19 1820  Last data filed at 01/11/19 1401   Gross per 24 hour   Intake              480 ml   Output             1050 ml   Net             -570 ml       Invasive Devices     Peripheral Intravenous Line            Peripheral IV 01/09/19 Left Antecubital 1 day    Peripheral IV 01/09/19 Left Hand 1 day                Nutrition/GI Proph/Bowel Reg: regular    Physical Exam:   Physical Exam   Constitutional: She is oriented to person, place, and time  She appears well-developed  No distress  HENT:   Head: Normocephalic  Eyes: Pupils are equal, round, and reactive to light  Conjunctivae and EOM are normal  Right eye exhibits no discharge  Left eye exhibits no discharge  Neck: No tracheal deviation present  c-collar intact    Cardiovascular: Normal rate, regular rhythm, normal heart sounds and intact distal pulses  Pulmonary/Chest: Effort normal  No stridor  No respiratory distress  She has wheezes (left sided)  She has rales (few scattered rhonchi in bases, mostly on left side )  She exhibits no tenderness  98% on room air    Abdominal: Soft  Bowel sounds are normal  She exhibits no distension and no mass  There is no tenderness  There is no guarding  Musculoskeletal: She exhibits tenderness (posterior neck pain)  She exhibits no edema or deformity  Neurological: She is alert and oriented to person, place, and time  No cranial nerve deficit  Skin: Skin is warm and dry  Capillary refill takes less than 2 seconds  She is not diaphoretic  Psychiatric: She has a normal mood and affect  Lab Results:   Results: I have personally reviewed pertinent reports     and I have personally reviewed pertinent films in PACS, BMP/CMP:   Lab Results   Component Value Date    SODIUM 134 (L) 01/11/2019    K 3 7 01/11/2019    CL 98 (L) 01/11/2019    CO2 29 01/11/2019    BUN 20 01/11/2019    CREATININE 0 97 01/11/2019    CALCIUM 9 3 01/11/2019    EGFR 54 01/11/2019    and CBC:   Lab Results   Component Value Date    WBC 6 58 01/11/2019    HGB 10 1 (L) 01/11/2019    HCT 31 6 (L) 01/11/2019     (H) 01/11/2019     01/11/2019    MCH 32 2 01/11/2019    MCHC 32 0 01/11/2019    RDW 13 4 01/11/2019    MPV 9 8 01/11/2019     Imaging/EKG Studies: Results: I have personally reviewed pertinent reports     and I have personally reviewed pertinent films in PACS  Other Studies:   VTE Prophylaxis: SCDs, SQ Heparin

## 2019-01-11 NOTE — PROGRESS NOTES
Progress Note - Neurosurgery   Mey Toth 80 y o  female MRN: [id number]  Unit/Bed#: [facility] 634-01 Encounter: [id number]      Assessment:  1  Traumatic type 3 odontoid fracture, with extension to left superior articular facet and transverse foramen of C2  2  Nondisplaced fracture of C6 bridging osteophyte  3  History of Bovine tissue AVR, on Plavix  4  History of diabetes mellitus  5  History of hypertension  6  History of frequent falls with 2 episodes of strokes        Plan:   § Exam: A&OX3, speech fluent, finger-to-nose normal and without drift bilaterally, neuro intact and nonfocal   Tenderness at the posterior cervical spine C2 and C6 region, on a spin [place] collar, bruise on left shoulder noted  § Images:  CT of cervical spine on 01/09/2019 demonstrates traumatic type 3 odontoid fracture with extension to the left superior articular facet and transverse facet of C2  § Upright cervical spine x-rays in brace on 01/11/2019 demonstrates stable odontoid fracture and C6 fracture     ? Pain control:  Per primary team  ? DVT ppx:   § SCDs  § Continue heparin subcu  ? Activity:  As tolerated  ? PT/OT evaluation & treatment  ? Brace:  [place] cervical collar all the time, Esteafni collar for shower  ? Medical Mx:  Per primary team  ? Neurocheck:  Routine  ? Patient is complaining of posterior neck pain; otherwise neurological nonfocal   She is wearing [place] cervical collar  Walked 15 feet today using walker  PT/OT recommends acute inpatient rehab  Upright cervical spine x-rays appears was stable fractures  No neurosurgical intervention is anticipated at this juncture  Continue pain control, wearing [place] cervical collar, and physical therapy  We also recommend APS evaluation and treatment if pain becomes worse  Will sign off  Follow-up in 2 weeks with another upright cervical spine x-rays  Call for concern or problem        Subjective/Objective   Chief Complaint: "I have pain on my back of neck"/progress note    Subjective:  Patient complains of moderate to severe pain on head posterior neck, radiating to the lateral face of left-side, denies any radiculopathy to the upper extremity  Denies bowel or bladder issues  She started to walk and denies any gait issues or tendency to fall  Objective:  Patient is sitting in the recliner, [place] cervical collar or    I/O       01/09 0701 - 01/10 0700 01/10 0701 - 01/11 0700 01/11 0701 - 01/12 0700    P  O  150 510     I V  (mL/kg) 478 3 (5 1) 345 8 (3 7)     Total Intake(mL/kg) 628 3 (6 7) 855 8 (9 2)     Urine (mL/kg/hr) 200 2049 (0 9)     Total Output 200 2049      Net +428 3 -1193 2             Unmeasured Urine Occurrence 1 x            Invasive Devices     Peripheral Intravenous Line            Peripheral IV 01/09/19 Left Antecubital 1 day    Peripheral IV 01/09/19 Left Hand 1 day                Physical Exam:  Vitals: Blood pressure 170/56, pulse 57, temperature 98 4 °F (36 9 °C), resp  rate 18, height 4' 11 75" (1 518 m), weight 93 3 kg (205 lb 11 oz), SpO2 94 %  ,Body mass index is 40 51 kg/m²          General appearance: alert, appears stated age, cooperative and in no pain distress time of exam  Head: Normocephalic, without obvious abnormality, atraumatic  Eyes: EOMI, 2 mm conjugate  Neck: supple, a spin [place] collar on, left shoulder tip bruise noted  Lungs: non labored breathing  Heart: regular heart rate  Neurologic:   Mental status: Alert, oriented x3, thought content appropriate  Cranial nerves: grossly intact (Cranial nerves II-XII)  Sensory: normal to light touch throughout  Motor: moving all extremities without focal weakness, strength is 5/5 throughout  Reflexes: 2+ and symmetric; without clonus and Babinski negative bilaterally  Coordination: finger to nose normal bilaterally, no drift bilaterally      Lab Results:    Results from last 7 days  Lab Units 01/11/19  0604 01/10/19  0530 01/09/19  2049   WBC Thousand/uL 6 58 9 36 8 84 HEMOGLOBIN g/dL 10 1* 10 9* 11 8   HEMATOCRIT % 31 6* 34 9 37 5   PLATELETS Thousands/uL 153 156 183   NEUTROS PCT %  --  78* 63   MONOS PCT %  --  6 6       Results from last 7 days  Lab Units 01/11/19  0604 01/10/19  0530 01/09/19 2049 01/09/19  2043   POTASSIUM mmol/L 3 7 4 7 4 4  --    CHLORIDE mmol/L 98* 104 102  --    CO2 mmol/L 29 24 27  --    CO2, I-STAT mmol/L  --   --   --  31   BUN mg/dL 20 22 25  --    CREATININE mg/dL 0 97 1 00 1 12  --    CALCIUM mg/dL 9 3 8 8 9 0  --    ALK PHOS U/L  --   --  70  --    ALT U/L  --   --  38  --    AST U/L  --   --  36  --    GLUCOSE, ISTAT mg/dl  --   --   --  178*       Results from last 7 days  Lab Units 01/11/19  0604   MAGNESIUM mg/dL 2 1           Results from last 7 days  Lab Units 01/10/19  0530   INR  1 02     No results found for: TROPONINT  ABG:No results found for: PHART, XRQ7CIK, PO2ART, MJR6DSH, K8OOKHNB, BEART, SOURCE    Imaging Studies: I have personally reviewed pertinent reports  and I have personally reviewed pertinent films in PACS    EKG, Pathology, and Other Studies: I have personally reviewed pertinent reports        VTE Pharmacologic Prophylaxis: Heparin SQ    VTE Mechanical Prophylaxis: sequential compression device

## 2019-01-11 NOTE — DISCHARGE INSTRUCTIONS
VISTA collar at all times except for showering change to caleb collar  No heavy lifting  No strenuous activities  NO DRIVING  **Please notify MD immediately if you have increased neck or arm pain  New numbness and/or weakness in your arm  Difficulty swallowing or breathing especially while lying down  Numbness or weakness in arms or legs   **

## 2019-01-11 NOTE — ASSESSMENT & PLAN NOTE
- cervical collar per neurosurgery recs  - upright Xrays today  - analgesia: pt reporting she takes hydrocodone at home  I explained oxycodone is preferred in geriatric population related to side effect profile and to optimize tylenol use and minimize narcotics (with 2 5 mg dosing available)   She is agreeable to try this method and daughter is requesting to try 2 5 with additional 2 5 available as breakthrough  - neuro intact    - PT/OT recommending rehab, CM assisting with placement

## 2019-01-11 NOTE — PLAN OF CARE
DISCHARGE PLANNING     Discharge to home or other facility with appropriate resources Progressing        DISCHARGE PLANNING - CARE MANAGEMENT     Discharge to post-acute care or home with appropriate resources Progressing        HEMATOLOGIC - ADULT     Maintains hematologic stability Progressing        INFECTION - ADULT     Absence or prevention of progression during hospitalization Progressing        Knowledge Deficit     Patient/family/caregiver demonstrates understanding of disease process, treatment plan, medications, and discharge instructions Progressing        METABOLIC, FLUID AND ELECTROLYTES - ADULT     Electrolytes maintained within normal limits Progressing     Fluid balance maintained Progressing     Glucose maintained within target range Progressing        MUSCULOSKELETAL - ADULT     Maintain or return mobility to safest level of function Progressing     Maintain proper alignment of affected body part Progressing        NEUROSENSORY - ADULT     Achieves stable or improved neurological status Progressing     Absence of seizures Progressing     Remains free of injury related to seizures activity Progressing     Achieves maximal functionality and self care Progressing        Nutrition/Hydration-ADULT     Nutrient/Hydration intake appropriate for improving, restoring or maintaining nutritional needs Progressing        PAIN - ADULT     Verbalizes/displays adequate comfort level or baseline comfort level Progressing        Potential for Falls     Patient will remain free of falls Progressing        Prexisting or High Potential for Compromised Skin Integrity     Skin integrity is maintained or improved Progressing        SAFETY ADULT     Maintain or return to baseline ADL function Progressing     Maintain or return mobility status to optimal level Progressing        SKIN/TISSUE INTEGRITY - ADULT     Skin integrity remains intact Progressing     Incision(s), wounds(s) or drain site(s) healing without S/S of infection Progressing     Oral mucous membranes remain intact Progressing

## 2019-01-11 NOTE — PROGRESS NOTES
Progress Note - Karlene Hernandez 80 y o  female MRN: [id number]    Unit/Bed#: [facility] 634-01 Encounter: [id number]      Assessment/Plan:  1  Ambulatory dysfunction/fall  Fall precautions  Daughter confirms patient does not take Ativan at home  PT/OT  Recommend inpatient rehab  Continue calcium and vitamin-D supplementation    2  Acute pain secondary to trauma  Patient refused Lidoderm patch   Patient and daughter educated on need for Lidoderm patch, agreed to use  Lidoderm ordered for left neck and mid back  Continue with scheduled acetaminophen 975 mg  Continue low-dose oxycodone 2 5 mg p o  P r n  Q 4 hours for moderate pain and oxycodone 5 mg p o  P r n  For severe pain  Dilaudid 0 2 mg IV p r n  q 4 hours for breakthrough pain    3  Constipation secondary to opioid use  Daughter reports history of constipation with use of opioids  Patient reports no bowel movement last 2 days  Continue with scheduled Colace  Will order senna how  MiraLax p r n  Continue to monitor    4  Delirium precautions  The patient is alert and orient x3  Daughter reports patient is slow to respond but not as sharp as she normally is  Multifactorial risk factors  Continue with pain protocol and bowel regimen  Provide frequent redirection, reorientation, distraction techniques    Avoid deliriogenic medications such as tramadol, benzodiazepines, anticholinergics,  Benadryl  Treat pain, See geriatric pain protocol  Monitor for constipation and urinary retention  Encourage early and frequent moblization, OOB  Encourage Hydration/ Nutrition  Implement sleep hygiene, limit night time interuptions, group activities    5  Forgetfulness  Mini cog was 4/5 yesterday  Patient complained of fatigue and not sleeping last night  Vitamin B12 and folate within normal limits    6  Deconditioning  Multifactorial  Encourage frequent mobilization  Encourage p o   Hydration/nutrition  Encourage incentive spirometry  Encourage reorientation and conversation to keep mind active  Recommend engagement been puzzles  PT/OT       7  Depression  Patient with reported history of depression and recent loss of  and other family members  Zoloft 25 mg p o  Daily started    8  Odontoid Fx, type 3/ nondisplaced C6 fracture  Neurosurgery following            Subjective:   "I did not sleep well last night, the nurses kept waking up patient is alert and oriented x3, out of bed, daughter is at bedside  Per nursing patient was reportedly slept well  Patient complaining of pain, refusing Lidoderm patch  Patient and daughter educated on use of Lidoderm patch and goal of reducing need for narcotic/opioid medications for pain  Patient tolerating p o  Well, reports constipation  Objective:     Vitals: Blood pressure 170/56, pulse 57, temperature 98 4 °F (36 9 °C), resp  rate 18, height 4' 11 75" (1 518 m), weight 93 3 kg (205 lb 11 oz), SpO2 94 %  ,Body mass index is 40 51 kg/m²  Intake/Output Summary (Last 24 hours) at 01/11/19 1136  Last data filed at 01/11/19 0601   Gross per 24 hour   Intake              480 ml   Output             1758 ml   Net            -1278 ml       Physical Exam: /56   Pulse 57   Temp 98 4 °F (36 9 °C)   Resp 18   Ht 4' 11 75" (1 518 m)   Wt 93 3 kg (205 lb 11 oz)   SpO2 94%   BMI 40 51 kg/m²    Gen:  Alert and orient x3, no acute distress  Cardiovascular:  S1, S2, no murmur rub or gallop  Pulmonary:  Clear to auscultation no wheezes rales or rhonchi  Abdomen:  Soft, slightly distended, bowel sounds hypoactive    Invasive Devices     Peripheral Intravenous Line            Peripheral IV 01/09/19 Left Antecubital 1 day    Peripheral IV 01/09/19 Left Hand 1 day                Lab, Imaging and other studies: I have personally reviewed pertinent reports      VTE Pharmacologic Prophylaxis: Sequential compression device (Venodyne)   VTE Mechanical Prophylaxis: sequential compression device

## 2019-01-11 NOTE — PLAN OF CARE
Problem: OCCUPATIONAL THERAPY ADULT  Goal: Performs self-care activities at highest level of function for planned discharge setting  See evaluation for individualized goals  Treatment Interventions: ADL retraining, Functional transfer training, Endurance training, Cognitive reorientation, Patient/family training, Equipment evaluation/education, Compensatory technique education, Energy conservation, Activityengagement          See flowsheet documentation for full assessment, interventions and recommendations  Limitation: Decreased ADL status, Decreased Safe judgement during ADL, Decreased cognition, Decreased endurance, Decreased self-care trans, Decreased high-level ADLs  Prognosis: Good  Assessment: 79 YO Female SEEN FOR INITIAL OCCUPATIONAL THERAPY EVALUATION FOLLOWING ADMISSION TO [facility] S/ FALL RESULTING IN TYPE 3 ODONTOID FX WITH EXTENSION TO L SUPERVISOR ARTICULAR FACET AND TRANSVERSE FORAMEN OF C2  PT CURRENTLY HAS THE FOLLOWING RESTRICTIONS;SPINAL PRECAUTIONS and C-COLLAR   PROBLEMS LIST INCLUDES DM, HTN, FREQUENT FALLS AND FORGETFULNESS  PT IS FROM AN [facility]   WHERE SHE REPORTS BEING INDEPENDENT WITH ADLS AND HAS ASSIST FOR IADLS PTA  PT CURRENTLY REQUIRES OVERALL MOD-MAX A WITH ADLS, MIN A WITH TRANSFERS AND MOD A WITH LIMITED FUNCTIONAL MOBILITY WITH USE OF RW + CUES FOR SEQUENCING/SAFETY T/O  PT IS LIMITED 2' PAIN, FATIGUE, IMPAIRED BALANCE, FALL RISK , LIMITED SAFETY AWARENESS/INSIGHT/JUDGEMENT, SPINAL PRECAUTIONS, OVERALL WEAKNESS/DECONDITIONING , DIZZINESS WITH CHANGE OF POSITIONING  and OVERALL LIMITED ACTIVITY TOLERANCE  PT EDUCATED ON SPINAL PRECAUTIONS, DEEP BREATHING TECHNIQUES T/O ACTIVITY and CONTINUE PARTICIPATION IN SELF-CARE/MOBILITY WITH STAFF WHILE IN THE HOSPITAL   FROM AN OCCUPATIONAL THERAPY PERSPECTIVE, PT WOULD BENEFIT FROM ADDITIONAL OT SERVICES IN AN INPT REHAB SETTING UPON D/C  WILL CONT TO FOLLOW TO ADDRESS THE BELOW DESCRIBED GOALS  OT Discharge Recommendation: Short Term Rehab  OT - OK to Discharge:  Yes

## 2019-01-11 NOTE — ASSESSMENT & PLAN NOTE
- daughter reports that she is very sensitive to hypertension and 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 pressures  Will resume home lasix today as she has some wheezing and a few crackles noted on exam  Monitor response   Could consider increasing norvasc to 10 mg dosing if continues to have HTN

## 2019-01-12 ENCOUNTER — APPOINTMENT (INPATIENT)
Dept: RADIOLOGY | Facility: HOSPITAL | Age: 84
DRG: 552 | End: 2019-01-12
Payer: COMMERCIAL

## 2019-01-12 LAB
ANION GAP SERPL CALCULATED.3IONS-SCNC: 7 MMOL/L (ref 4–13)
BUN SERPL-MCNC: 20 MG/DL (ref 5–25)
CALCIUM SERPL-MCNC: 9.3 MG/DL (ref 8.3–10.1)
CHLORIDE SERPL-SCNC: 97 MMOL/L (ref 100–108)
CO2 SERPL-SCNC: 30 MMOL/L (ref 21–32)
CREAT SERPL-MCNC: 0.98 MG/DL (ref 0.6–1.3)
GFR SERPL CREATININE-BSD FRML MDRD: 53 ML/MIN/1.73SQ M
GLUCOSE SERPL-MCNC: 98 MG/DL (ref 65–140)
POTASSIUM SERPL-SCNC: 3.6 MMOL/L (ref 3.5–5.3)
SODIUM SERPL-SCNC: 134 MMOL/L (ref 136–145)

## 2019-01-12 PROCEDURE — 97116 GAIT TRAINING THERAPY: CPT

## 2019-01-12 PROCEDURE — 99232 SBSQ HOSP IP/OBS MODERATE 35: CPT | Performed by: PHYSICIAN ASSISTANT

## 2019-01-12 PROCEDURE — 80048 BASIC METABOLIC PNL TOTAL CA: CPT | Performed by: NURSE PRACTITIONER

## 2019-01-12 PROCEDURE — 97530 THERAPEUTIC ACTIVITIES: CPT

## 2019-01-12 PROCEDURE — 71045 X-RAY EXAM CHEST 1 VIEW: CPT

## 2019-01-12 PROCEDURE — 97110 THERAPEUTIC EXERCISES: CPT

## 2019-01-12 RX ORDER — NYSTATIN 100000 [USP'U]/G
POWDER TOPICAL 2 TIMES DAILY
Status: DISCONTINUED | OUTPATIENT
Start: 2019-01-12 | End: 2019-01-17 | Stop reason: HOSPADM

## 2019-01-12 RX ADMIN — NYSTATIN: 100000 CREAM TOPICAL at 09:21

## 2019-01-12 RX ADMIN — OXYCODONE HYDROCHLORIDE 2.5 MG: 5 TABLET ORAL at 05:49

## 2019-01-12 RX ADMIN — ACETAMINOPHEN 975 MG: 325 TABLET ORAL at 13:18

## 2019-01-12 RX ADMIN — SENNOSIDES 8.6 MG: 8.6 TABLET, FILM COATED ORAL at 22:06

## 2019-01-12 RX ADMIN — LEVOTHYROXINE SODIUM 112 MCG: 112 TABLET ORAL at 05:47

## 2019-01-12 RX ADMIN — ACETAMINOPHEN 975 MG: 325 TABLET ORAL at 22:05

## 2019-01-12 RX ADMIN — CALCIUM CARBONATE 500 MG (1,250 MG)-VITAMIN D3 200 UNIT TABLET 1 TABLET: at 18:16

## 2019-01-12 RX ADMIN — HYDROXYCHLOROQUINE SULFATE 200 MG: 200 TABLET, FILM COATED ORAL at 09:13

## 2019-01-12 RX ADMIN — GABAPENTIN 600 MG: 300 CAPSULE ORAL at 22:05

## 2019-01-12 RX ADMIN — METOPROLOL TARTRATE 25 MG: 25 TABLET, FILM COATED ORAL at 09:13

## 2019-01-12 RX ADMIN — SERTRALINE HYDROCHLORIDE 25 MG: 25 TABLET ORAL at 22:05

## 2019-01-12 RX ADMIN — GABAPENTIN 300 MG: 300 CAPSULE ORAL at 09:13

## 2019-01-12 RX ADMIN — OXYCODONE HYDROCHLORIDE 2.5 MG: 5 TABLET ORAL at 03:25

## 2019-01-12 RX ADMIN — LOSARTAN POTASSIUM 100 MG: 50 TABLET, FILM COATED ORAL at 09:13

## 2019-01-12 RX ADMIN — OXYCODONE HYDROCHLORIDE 2.5 MG: 5 TABLET ORAL at 22:08

## 2019-01-12 RX ADMIN — DOCUSATE SODIUM 100 MG: 100 CAPSULE, LIQUID FILLED ORAL at 09:13

## 2019-01-12 RX ADMIN — HEPARIN SODIUM 5000 UNITS: 5000 INJECTION INTRAVENOUS; SUBCUTANEOUS at 22:04

## 2019-01-12 RX ADMIN — FUROSEMIDE 20 MG: 20 TABLET ORAL at 09:21

## 2019-01-12 RX ADMIN — HEPARIN SODIUM 5000 UNITS: 5000 INJECTION INTRAVENOUS; SUBCUTANEOUS at 05:47

## 2019-01-12 RX ADMIN — AMLODIPINE BESYLATE 5 MG: 5 TABLET ORAL at 09:13

## 2019-01-12 RX ADMIN — ACETAMINOPHEN 975 MG: 325 TABLET ORAL at 05:47

## 2019-01-12 RX ADMIN — HEPARIN SODIUM 5000 UNITS: 5000 INJECTION INTRAVENOUS; SUBCUTANEOUS at 13:19

## 2019-01-12 RX ADMIN — OXYCODONE HYDROCHLORIDE 2.5 MG: 5 TABLET ORAL at 09:13

## 2019-01-12 RX ADMIN — Medication 1000 MG: at 09:13

## 2019-01-12 RX ADMIN — OXYCODONE HYDROCHLORIDE 2.5 MG: 5 TABLET ORAL at 18:16

## 2019-01-12 RX ADMIN — LIDOCAINE 1 PATCH: 50 PATCH CUTANEOUS at 09:00

## 2019-01-12 RX ADMIN — CLOPIDOGREL 75 MG: 75 TABLET, FILM COATED ORAL at 09:13

## 2019-01-12 RX ADMIN — OXYCODONE HYDROCHLORIDE 2.5 MG: 5 TABLET ORAL at 13:18

## 2019-01-12 RX ADMIN — LIDOCAINE 1 PATCH: 50 PATCH CUTANEOUS at 09:14

## 2019-01-12 RX ADMIN — CALCIUM CARBONATE 500 MG (1,250 MG)-VITAMIN D3 200 UNIT TABLET 1 TABLET: at 09:13

## 2019-01-12 RX ADMIN — NYSTATIN: 100000 POWDER TOPICAL at 18:17

## 2019-01-12 RX ADMIN — DOCUSATE SODIUM 100 MG: 100 CAPSULE, LIQUID FILLED ORAL at 18:17

## 2019-01-12 RX ADMIN — METOPROLOL TARTRATE 25 MG: 25 TABLET, FILM COATED ORAL at 22:05

## 2019-01-12 NOTE — ASSESSMENT & PLAN NOTE
- cervical collar per neurosurgery recs  - upright Xrays today  - analgesia: pt reporting she takes hydrocodone at home   -agreeable to current regimen here  - neuro intact    - PT/OT recommending rehab, CM assisting with placement

## 2019-01-12 NOTE — PROGRESS NOTES
Progress Note - Cliff Hutchison 1934, 80 y o  female MRN: [id number]    Unit/Bed#: [facility] 634-01 Encounter: [id number]    Primary Care Provider: Brandon Giles DO   Date and time admitted to hospital: 1/9/2019  8:31 PM        Delirium   Assessment & Plan    - delirium overnight, resolved in the day time  - delirium precautions  - declined melatonin      Hypertension   Assessment & Plan    - daughter reports that she is very sensitive to hypertension and 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 pressures  Will resume home lasix today as she has some wheezing and a few crackles noted on exam  Monitor response  Could consider increasing norvasc to 10 mg dosing if continues to have HTN       Closed nondisplaced fracture of second cervical vertebra (HCC)   Assessment & Plan    - cervical collar per neurosurgery recs  - upright Xrays today  - analgesia: pt reporting she takes hydrocodone at home   -agreeable to current regimen here  - neuro intact    - PT/OT recommending rehab, CM assisting with placement      * Type III fracture of odontoid process ([address] )   Assessment & Plan    -see above       DVT prophylaxis: SCDs and SQH  PT and OT: rehab    Disposition:  DC pending  Awaiting Case Management to find placement  Subjective/Objective   Chief Complaint: "No new complaints "    Subjective:  Patient offers no new complaints today on presentation  Denies any new nausea or vomiting  Tolerating collar  Tolerating a diet  Already out of bed to chair today  No chest pain or shortness of breath  Pain is well controlled      Objective:     Meds/Allergies   Prescriptions Prior to Admission   Medication Sig Dispense Refill Last Dose    albuterol (PROVENTIL HFA,VENTOLIN HFA) 90 mcg/act inhaler Inhale 2 puffs every 4 (four) hours as needed for wheezing       amLODIPine (NORVASC) 5 mg tablet Take 5 mg by mouth daily       calcium carbonate-vitamin D (OSCAL-D) 500 mg-200 units per tablet Take 1 tablet by mouth 2 (two) times a day with meals         calcium-vitamin D 250-100 MG-UNIT per tablet Take 1 tablet by mouth 2 (two) times a day       clopidogrel (PLAVIX) 75 mg tablet Take 75 mg by mouth daily       furosemide (LASIX) 20 mg tablet Take 20 mg by mouth daily       gabapentin (NEURONTIN) 300 mg capsule Take 300 mg by mouth 2 (two) times a day 1 CAPSULE IN AM (300MG) AND 2 CAPS AT HS (600MG)        HYDROcodone-acetaminophen (NORCO) 5-325 mg per tablet Take 1 tablet by mouth every 6 (six) hours as needed for pain       hydroxychloroquine (PLAQUENIL) 200 mg tablet Take 200 mg by mouth daily with breakfast       Levothyroxine Sodium 112 MCG CAPS Take by mouth daily       lidocaine (LIDODERM) 5 % Apply 2 patches topically daily Remove & Discard patch within 12 hours or as directed by MD         LORazepam (ATIVAN) 0 5 mg tablet Take 0 5 mg by mouth 2 (two) times a day as needed for anxiety         losartan (COZAAR) 100 MG tablet Take 100 mg by mouth daily       LOSARTAN POTASSIUM-HCTZ PO Take by mouth       metoprolol tartrate (LOPRESSOR) 25 mg tablet Take 25 mg by mouth every 12 (twelve) hours       Multiple Vitamins-Calcium (ONE-A-DAY WOMENS PO) Take by mouth       nitroglycerin (NITROSTAT) 0 4 mg SL tablet Place 0 4 mg under the tongue every 5 (five) minutes as needed for chest pain       Omega-3 Fatty Acids (FISH OIL) 1,000 mg Take 1,000 mg by mouth daily       potassium chloride (K-DUR,KLOR-CON) 10 mEq tablet Take 10 mEq by mouth 2 (two) times a day       Red Yeast Rice 600 MG CAPS Take by mouth daily       sertraline (ZOLOFT) 25 mg tablet Take 25 mg by mouth daily          Vitals: Blood pressure 160/64, pulse 62, temperature 97 5 °F (36 4 °C), resp  rate 18, height 4' 11 75" (1 518 m), weight 93 8 kg (206 lb 12 7 oz), SpO2 94 %  Body mass index is 40 72 kg/m²   SpO2: SpO2: 94 %, SpO2 Device: O2 Device: None (Room air)    ABG: No results found for: PHART, XDU2UDW, PO2ART, QAK2WON, G8FWEXVT, BEART, SOURCE      Intake/Output Summary (Last 24 hours) at 01/12/19 1628  Last data filed at 01/12/19 1623   Gross per 24 hour   Intake              270 ml   Output             2430 ml   Net            -2160 ml       Invasive Devices     Peripheral Intravenous Line            Peripheral IV 01/09/19 Left Antecubital 2 days          Drain            External Urinary Catheter -- days                Nutrition/GI Proph/Bowel Reg:  Continue current diet    Physical Exam:   GENERAL APPEARANCE:  No acute distress, well-appearing  HEENT:  Normocephalic, PERRLA  CV:  Regular rate and rhythm  LUNGS:  CTA bilaterally  ABD:  Bowel sounds x4, nontender  EXT:  +2 pulses on extremities, neurovascularly intact  NEURO:  Cranial nerves 2-12 intact, no focal deficits, collar in place  SKIN:  Warm, dry, intact    Lab Results:   Results: I have personally reviewed pertinent reports   , BMP/CMP:   Lab Results   Component Value Date    SODIUM 134 (L) 01/12/2019    K 3 6 01/12/2019    CL 97 (L) 01/12/2019    CO2 30 01/12/2019    BUN 20 01/12/2019    CREATININE 0 98 01/12/2019    CALCIUM 9 3 01/12/2019    EGFR 53 01/12/2019    and CBC: No results found for: WBC, HGB, HCT, MCV, PLT, ADJUSTEDWBC, MCH, MCHC, RDW, MPV, NRBC  Imaging/EKG Studies: Results: I have personally reviewed pertinent reports  Other Studies:  No other studies  VTE Prophylaxis:  SCDs and subcutaneous heparin    Nurse rounds completed  Family updated at bedside

## 2019-01-12 NOTE — PLAN OF CARE
Problem: PHYSICAL THERAPY ADULT  Goal: Performs mobility at highest level of function for planned discharge setting  See evaluation for individualized goals  Treatment/Interventions: LE strengthening/ROM, Functional transfer training, Elevations, Therapeutic exercise, Endurance training, Cognitive reorientation, Patient/family training, Equipment eval/education, Bed mobility, Gait training, Compensatory technique education, Continued evaluation, Spoke to nursing  Equipment Recommended: Holly Saleh (wide )       See flowsheet documentation for full assessment, interventions and recommendations  Outcome: Progressing  Prognosis: Good  Problem List: Decreased strength, Decreased range of motion, Decreased endurance, Impaired balance, Decreased coordination, Decreased mobility, Decreased safety awareness, Pain  Assessment: Pt was found supine to begin session  She was able to perform all bed mobility and transfers with mod to min Ax1 with cuing needed for hand placement and LE management at times  She denied any dizziness upon sitting up and was able to ambulate into the hallway going increased distances compared to LV  She did c/o increased neck pain with ambulation and requested to go back to the room  She was seated in bedside chair where she perform seated TE with no increased symptoms but muscular fatigue noted in B/L LE  Pt was comfortable in chair with overall fatigue noted having her call bell in reach  She would benefitr from continued PT in order to promote safe and functional mobility           Recommendation: Post acute IP rehab          See flowsheet documentation for full assessment

## 2019-01-12 NOTE — PHYSICAL THERAPY NOTE
Physical Therapy Progress Note     19 1050   Pain Assessment   Pain Assessment 0-10   Pain Score 4   Pain Type Acute pain   Pain Location Head;Neck   Pain Orientation Bilateral   Restrictions/Precautions   Braces or Orthoses C/S Collar   Other Precautions Cognitive; Fall Risk;Telemetry;Spinal precautions   General   Family/Caregiver Present Yes   Cognition   Overall Cognitive Status Impaired   Arousal/Participation Alert; Cooperative   Attention Attends with cues to redirect   Orientation Level Oriented X4   Memory Decreased recall of precautions;Decreased recall of recent events;Decreased short term memory   Following Commands Follows one step commands without difficulty   Comments Pt was identified by name and , agreeable to treatment  Bed Mobility   Rolling L 3  Moderate assistance   Additional items Assist x 1;HOB elevated; Increased time required;Verbal cues   Supine to Sit 3  Moderate assistance   Additional items Assist x 1;HOB elevated; Increased time required; Bedrails;Verbal cues;LE management   Transfers   Sit to Stand 4  Minimal assistance   Additional items Assist x 1; Armrests; Increased time required;Verbal cues   Stand to Sit 4  Minimal assistance   Additional items Assist x 1; Armrests; Increased time required;Verbal cues   Stand pivot 4  Minimal assistance   Additional items Assist x 1; Armrests; Increased time required;Verbal cues   Ambulation/Elevation   Gait pattern Narrow JEFE; Decreased foot clearance; Short stride; Step to;Excessively slow   Gait Assistance 4  Minimal assist   Additional items Assist x 1;Verbal cues; Tactile cues  (For walker direstion)   Assistive Device Rolling walker   Distance 30'x2   Balance   Static Sitting Fair   Dynamic Sitting Fair -   Static Standing Fair -   Dynamic Standing Poor +   Ambulatory Poor +   Endurance Deficit   Endurance Deficit Yes   Endurance Deficit Description general deconditioning   Activity Tolerance   Activity Tolerance Patient limited by fatigue;Patient limited by pain   Nurse Made Aware yes, ok to see   Exercises   Knee AROM Long Arc Quad Sitting;10 reps;AROM; Bilateral   Ankle Pumps Sitting;20 reps;AROM; Bilateral   Assessment   Prognosis Good   Problem List Decreased strength;Decreased range of motion;Decreased endurance; Impaired balance;Decreased coordination;Decreased mobility; Decreased safety awareness;Pain   Assessment Pt was found supine to begin session  She was able to perform all bed mobility and transfers with mod to min Ax1 with cuing needed for hand placement and LE management at times  She denied any dizziness upon sitting up and was able to ambulate into the hallway going increased distances compared to LV  She did c/o increased neck pain with ambulation and requested to go back to the room  She was seated in bedside chair where she perform seated TE with no increased symptoms but muscular fatigue noted in B/L LE  Pt was comfortable in chair with overall fatigue noted having her call bell in reach  She would benefitr from continued PT in order to promote safe and functional mobility      Goals   Patient Goals To go home  STG Expiration Date 01/20/19   Short Term Goal #1 As per PT: 1  Modified Independent with Bed Mobility Rolling Right and Left 2  Modified Independent with Bed Mobility Supine-Sit 3  Modified Independent with Transfer Bed-Chair 4  Increase Dynamic Sitting Balance at least 1 Grade for improved stability with functional reach activities 5  Increase Dynamic Standing Balance at least 1 Grade for improved ease with Activities of Daily Living 6  Increase Lower Extremity Strength at least 1 Grade for improved ease mobility tasks 7  Modified Independent with Ambulation 200 feet using RW to facilitate home and community mobility   Treatment Day 1   Plan   Treatment/Interventions LE strengthening/ROM; Functional transfer training;Elevations; Therapeutic exercise; Endurance training;Cognitive reorientation;Patient/family training;Equipment eval/education; Bed mobility;Gait training; Compensatory technique education;Continued evaluation;Spoke to nursing   PT Frequency Other (Comment)  (4-6x/week)   Recommendation   Recommendation Post acute IP rehab   Equipment Recommended Azalea Triana  (RW, Wide)     Sheela Dennison, PTA

## 2019-01-12 NOTE — SOCIAL WORK
Per [place] [facility], Pt's insurance carrier is closed for the weekend, therefore, unable to obtain an auth for an admission to [facility] over the weekend  CM will continue to follow

## 2019-01-13 PROCEDURE — 97116 GAIT TRAINING THERAPY: CPT

## 2019-01-13 PROCEDURE — 97530 THERAPEUTIC ACTIVITIES: CPT

## 2019-01-13 PROCEDURE — 99232 SBSQ HOSP IP/OBS MODERATE 35: CPT | Performed by: PHYSICIAN ASSISTANT

## 2019-01-13 PROCEDURE — 97112 NEUROMUSCULAR REEDUCATION: CPT

## 2019-01-13 PROCEDURE — 97535 SELF CARE MNGMENT TRAINING: CPT

## 2019-01-13 RX ORDER — AMLODIPINE BESYLATE 10 MG/1
10 TABLET ORAL DAILY
Status: DISCONTINUED | OUTPATIENT
Start: 2019-01-14 | End: 2019-01-17 | Stop reason: HOSPADM

## 2019-01-13 RX ADMIN — GABAPENTIN 300 MG: 300 CAPSULE ORAL at 09:48

## 2019-01-13 RX ADMIN — METOPROLOL TARTRATE 25 MG: 25 TABLET, FILM COATED ORAL at 21:03

## 2019-01-13 RX ADMIN — LIDOCAINE 1 PATCH: 50 PATCH CUTANEOUS at 09:58

## 2019-01-13 RX ADMIN — NYSTATIN: 100000 POWDER TOPICAL at 18:20

## 2019-01-13 RX ADMIN — CALCIUM CARBONATE 500 MG (1,250 MG)-VITAMIN D3 200 UNIT TABLET 1 TABLET: at 18:19

## 2019-01-13 RX ADMIN — OXYCODONE HYDROCHLORIDE 2.5 MG: 5 TABLET ORAL at 13:56

## 2019-01-13 RX ADMIN — OXYCODONE HYDROCHLORIDE 2.5 MG: 5 TABLET ORAL at 09:50

## 2019-01-13 RX ADMIN — AMLODIPINE BESYLATE 5 MG: 5 TABLET ORAL at 09:48

## 2019-01-13 RX ADMIN — NYSTATIN: 100000 POWDER TOPICAL at 10:21

## 2019-01-13 RX ADMIN — SENNOSIDES 8.6 MG: 8.6 TABLET, FILM COATED ORAL at 21:03

## 2019-01-13 RX ADMIN — HEPARIN SODIUM 5000 UNITS: 5000 INJECTION INTRAVENOUS; SUBCUTANEOUS at 21:02

## 2019-01-13 RX ADMIN — LEVOTHYROXINE SODIUM 112 MCG: 112 TABLET ORAL at 05:57

## 2019-01-13 RX ADMIN — OXYCODONE HYDROCHLORIDE 2.5 MG: 5 TABLET ORAL at 02:06

## 2019-01-13 RX ADMIN — NYSTATIN: 100000 CREAM TOPICAL at 18:20

## 2019-01-13 RX ADMIN — ACETAMINOPHEN 975 MG: 325 TABLET ORAL at 13:56

## 2019-01-13 RX ADMIN — NYSTATIN: 100000 CREAM TOPICAL at 10:21

## 2019-01-13 RX ADMIN — METOPROLOL TARTRATE 25 MG: 25 TABLET, FILM COATED ORAL at 09:48

## 2019-01-13 RX ADMIN — HEPARIN SODIUM 5000 UNITS: 5000 INJECTION INTRAVENOUS; SUBCUTANEOUS at 05:57

## 2019-01-13 RX ADMIN — ACETAMINOPHEN 975 MG: 325 TABLET ORAL at 21:02

## 2019-01-13 RX ADMIN — OXYCODONE HYDROCHLORIDE 2.5 MG: 5 TABLET ORAL at 22:33

## 2019-01-13 RX ADMIN — HYDROXYCHLOROQUINE SULFATE 200 MG: 200 TABLET, FILM COATED ORAL at 07:44

## 2019-01-13 RX ADMIN — OXYCODONE HYDROCHLORIDE 2.5 MG: 5 TABLET ORAL at 18:19

## 2019-01-13 RX ADMIN — GABAPENTIN 600 MG: 300 CAPSULE ORAL at 21:03

## 2019-01-13 RX ADMIN — OXYCODONE HYDROCHLORIDE 2.5 MG: 5 TABLET ORAL at 21:02

## 2019-01-13 RX ADMIN — HEPARIN SODIUM 5000 UNITS: 5000 INJECTION INTRAVENOUS; SUBCUTANEOUS at 13:56

## 2019-01-13 RX ADMIN — LOSARTAN POTASSIUM 100 MG: 50 TABLET, FILM COATED ORAL at 09:48

## 2019-01-13 RX ADMIN — CALCIUM CARBONATE 500 MG (1,250 MG)-VITAMIN D3 200 UNIT TABLET 1 TABLET: at 07:44

## 2019-01-13 RX ADMIN — SERTRALINE HYDROCHLORIDE 25 MG: 25 TABLET ORAL at 21:02

## 2019-01-13 RX ADMIN — FUROSEMIDE 20 MG: 20 TABLET ORAL at 09:48

## 2019-01-13 RX ADMIN — DOCUSATE SODIUM 100 MG: 100 CAPSULE, LIQUID FILLED ORAL at 18:19

## 2019-01-13 RX ADMIN — OXYCODONE HYDROCHLORIDE 2.5 MG: 5 TABLET ORAL at 07:43

## 2019-01-13 RX ADMIN — DOCUSATE SODIUM 100 MG: 100 CAPSULE, LIQUID FILLED ORAL at 09:48

## 2019-01-13 RX ADMIN — ACETAMINOPHEN 975 MG: 325 TABLET ORAL at 05:57

## 2019-01-13 RX ADMIN — CLOPIDOGREL 75 MG: 75 TABLET, FILM COATED ORAL at 09:58

## 2019-01-13 RX ADMIN — LIDOCAINE 1 PATCH: 50 PATCH CUTANEOUS at 09:48

## 2019-01-13 NOTE — PHYSICAL THERAPY NOTE
Physical Therapy Progress Note      01/13/19 1050   Pain Assessment   Pain Assessment 0-10   Pain Score 5   Pain Type Acute pain   Pain Location Neck;Back   Hospital Pain Intervention(s) Ambulation/increased activity; Distraction; Emotional support   Response to Interventions Tolerated   Restrictions/Precautions   Braces or Orthoses C/S Collar   Other Precautions Fall Risk;Cognitive;Pain   General   Chart Reviewed Yes   Response to Previous Treatment Patient with no complaints from previous session  Family/Caregiver Present Yes   Cognition   Arousal/Participation Alert; Cooperative   Comments Patient is pleasant and cooperative throughout session, intermittent encourgament   Bed Mobility   Rolling L 3  Moderate assistance   Additional items Assist x 1;HOB elevated; Increased time required;Verbal cues   Supine to Sit 3  Moderate assistance   Additional items Assist x 1;HOB elevated; Increased time required;Verbal cues;LE management   Transfers   Sit to Stand 4  Minimal assistance   Additional items Assist x 1; Armrests; Increased time required;Verbal cues   Stand to Sit 4  Minimal assistance   Additional items Assist x 1; Armrests; Increased time required;Verbal cues   Ambulation/Elevation   Gait pattern Excessively slow; Step to; Inconsistent cande;Decreased foot clearance; Improper Weight shift; Poor UE support   Gait Assistance 4  Minimal assist   Additional items Assist x 1;Verbal cues   Assistive Device Rolling walker   Distance 20 feet x 2   Balance   Static Sitting Fair   Dynamic Sitting Fair -   Static Standing Fair -   Dynamic Standing Poor +   Endurance Deficit   Endurance Deficit Yes   Activity Tolerance   Activity Tolerance Patient limited by fatigue;Patient limited by pain   Nurse Sarthak Hoffman RN   Exercises   Heelslides Sitting;10 reps;AAROM; Bilateral   Hip Flexion Sitting;15 reps;AAROM; Bilateral   Knee AROM Long Arc Quad Sitting;15 reps;AAROM; Bilateral   Ankle Pumps Sitting;15 reps;AROM; Bilateral Assessment   Prognosis Good   Problem List Decreased strength;Decreased range of motion;Decreased endurance; Impaired balance;Decreased mobility; Decreased coordination;Decreased safety awareness;Orthopedic restrictions;Pain   Assessment Patient lying supine in bed, agreeable to participate in therapy  She requires mod A to sit EOB with LE management  She ambulated 20 feetx2  with standing rest breaks and icnrease pain  Educaetd patient and family on use of commode to improve mobility with good understanding  Goals   Patient Goals To get better   STG Expiration Date 01/20/19   Treatment Day 2   Plan   Treatment/Interventions Functional transfer training;LE strengthening/ROM; Therapeutic exercise; Endurance training;Bed mobility;Gait training;Family   PT Frequency (4-6x a week)   Recommendation   Recommendation Post acute IP rehab   Equipment Recommended Walker Darylene Saber)   PT - OK to Discharge No  (to rehab when medically stable)       Chidi Duarte, PTA

## 2019-01-13 NOTE — ASSESSMENT & PLAN NOTE
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

## 2019-01-13 NOTE — PLAN OF CARE
Problem: PHYSICAL THERAPY ADULT  Goal: Performs mobility at highest level of function for planned discharge setting  See evaluation for individualized goals  Treatment/Interventions: LE strengthening/ROM, Functional transfer training, Elevations, Therapeutic exercise, Endurance training, Cognitive reorientation, Patient/family training, Equipment eval/education, Bed mobility, Gait training, Compensatory technique education, Continued evaluation, Spoke to nursing  Equipment Recommended: Rickey López (wide )       See flowsheet documentation for full assessment, interventions and recommendations  Outcome: Progressing  Prognosis: Good  Problem List: Decreased strength, Decreased range of motion, Decreased endurance, Impaired balance, Decreased mobility, Decreased coordination, Decreased safety awareness, Orthopedic restrictions, Pain  Assessment: Patient lying supine in bed, agreeable to participate in therapy  She requires mod A to sit EOB with LE management  She ambulated 20 feetx2  with standing rest breaks and icnrease pain  Educaetd patient and family on use of commode to improve mobility with good understanding  Recommendation: Post acute IP rehab     PT - OK to Discharge: No (to rehab when medically stable)    See flowsheet documentation for full assessment

## 2019-01-13 NOTE — OCCUPATIONAL THERAPY NOTE
Occupational Therapy Treatment Note:     01/13/19 1215   Restrictions/Precautions   Braces or Orthoses C/S Collar   Other Precautions Cognitive; Fall Risk;Pain   Pain Assessment   Pain Score 5   Pain Type Acute pain   Pain Location Neck   Hospital Pain Intervention(s) Repositioned   Response to Interventions tolerated   ADL   Where Assessed Commode   UB Dressing Assistance 4  Minimal Assistance   UB Dressing Deficit Thread RUE; Thread LUE;Pull around back   UB Dressing Comments don gown   Toileting Assistance  2  Maximal Assistance   Toileting Deficit Perineal hygiene   Toileting Comments in stance at rw   Functional Standing Tolerance   Time ~3 mins   Activity static standing   Comments during hygiene   Bed Mobility   Additional Comments Pt OOB upon presentation   Transfers   Sit to Stand 4  Minimal assistance   Additional items Assist x 1; Increased time required   Stand to Sit 4  Minimal assistance   Additional items Assist x 1; Increased time required   Stand pivot 4  Minimal assistance   Additional items Assist x 1; Increased time required   Toilet transfer 4  Minimal assistance   Additional items Assist x 1; Increased time required;Verbal cues   Additional Comments vc and emotional support required   Functional Mobility   Functional Mobility 3  Moderate assistance   Additional Comments for weight shifting and RW use   Additional items Rolling walker   Toilet Transfers   Toilet Transfer From [place] Type To and from   Toilet Transfer to Raised toilet seat with rails   Toilet Transfer Technique Ambulating;Stand pivot   Toilet Transfers Minimal assistance   Toilet Transfers Comments incerased time and verbal cues   Cognition   Overall Cognitive Status Impaired   Arousal/Participation Alert; Cooperative   Attention Attends with cues to redirect   Orientation Level Oriented X4   Memory Decreased recall of precautions;Decreased recall of recent events;Decreased short term memory   Following Commands Follows one step commands with increased time or repetition   Comments Pt is fearful of falling, requires increased emotional support theoughout required increased vc for techniques and safe rw use   Activity Tolerance   Activity Tolerance Patient limited by fatigue   Medical Staff Made Aware NSG aware   Assessment   Assessment Pt was seen this date for OT tx session focusing on dressing tasks, toileting, trasnfers and overall activity tolerance  Pt presents in bathroom with PCA finishing washing seated on commode, pt requires min A for donning gown to thread UE and pull around back  Min A and increased time with vc for STS from commode, pt is fearful of falling requires increased emotional support theoughout  Requires max A for perianal hyginene following toileting at RW level Pt requres mod A for ambulating transfer back to chair  Increased VC and emotional support tactile cues for RW use  Min A with increased cues for stand to sit in chair  Phone and call bell in reach, continue to follow with current POC     Plan   Treatment Interventions ADL retraining   Goal Expiration Date 01/25/19   Treatment Day 1   OT Frequency 3-5x/wk   Recommendation   OT Discharge Recommendation Short Term [address], [address]

## 2019-01-13 NOTE — PLAN OF CARE
Problem: OCCUPATIONAL THERAPY ADULT  Goal: Performs self-care activities at highest level of function for planned discharge setting  See evaluation for individualized goals  Treatment Interventions: ADL retraining, Functional transfer training, Endurance training, Cognitive reorientation, Patient/family training, Equipment evaluation/education, Compensatory technique education, Energy conservation, Activityengagement          See flowsheet documentation for full assessment, interventions and recommendations  Outcome: Progressing  Limitation: Decreased ADL status, Decreased Safe judgement during ADL, Decreased cognition, Decreased endurance, Decreased self-care trans, Decreased high-level ADLs  Prognosis: Good  Assessment: Pt was seen this date for OT tx session focusing on dressing tasks, toileting, trasnfers and overall activity tolerance  Pt presents in bathroom with PCA finishing washing seated on commode, pt requires min A for donning gown to thread UE and pull around back  Min A and increased time with vc for STS from commode, pt is fearful of falling requires increased emotional support theoughout  Requires max A for perianal hyginene following toileting at RW level Pt requres mod A for ambulating transfer back to chair  Increased VC and emotional support tactile cues for RW use  Min A with increased cues for stand to sit in chair  Phone and call bell in reach, continue to follow with current POC       OT Discharge Recommendation: Short Term Rehab  OT - OK to Discharge: Yes  SHAWN Nixon

## 2019-01-14 ENCOUNTER — TELEPHONE (OUTPATIENT)
Dept: NEUROSURGERY | Facility: CLINIC | Age: 84
End: 2019-01-14

## 2019-01-14 PROCEDURE — 97535 SELF CARE MNGMENT TRAINING: CPT

## 2019-01-14 PROCEDURE — 99232 SBSQ HOSP IP/OBS MODERATE 35: CPT | Performed by: EMERGENCY MEDICINE

## 2019-01-14 PROCEDURE — 94762 N-INVAS EAR/PLS OXIMTRY CONT: CPT

## 2019-01-14 RX ADMIN — METOPROLOL TARTRATE 25 MG: 25 TABLET, FILM COATED ORAL at 08:57

## 2019-01-14 RX ADMIN — OXYCODONE HYDROCHLORIDE 2.5 MG: 5 TABLET ORAL at 04:10

## 2019-01-14 RX ADMIN — HEPARIN SODIUM 5000 UNITS: 5000 INJECTION INTRAVENOUS; SUBCUTANEOUS at 21:01

## 2019-01-14 RX ADMIN — NYSTATIN: 100000 POWDER TOPICAL at 09:08

## 2019-01-14 RX ADMIN — DOCUSATE SODIUM 100 MG: 100 CAPSULE, LIQUID FILLED ORAL at 18:09

## 2019-01-14 RX ADMIN — GABAPENTIN 300 MG: 300 CAPSULE ORAL at 08:57

## 2019-01-14 RX ADMIN — SERTRALINE HYDROCHLORIDE 25 MG: 25 TABLET ORAL at 21:01

## 2019-01-14 RX ADMIN — ACETAMINOPHEN 975 MG: 325 TABLET ORAL at 13:17

## 2019-01-14 RX ADMIN — OXYCODONE HYDROCHLORIDE 2.5 MG: 5 TABLET ORAL at 21:01

## 2019-01-14 RX ADMIN — METOPROLOL TARTRATE 25 MG: 25 TABLET, FILM COATED ORAL at 21:00

## 2019-01-14 RX ADMIN — HYDROXYCHLOROQUINE SULFATE 200 MG: 200 TABLET, FILM COATED ORAL at 09:07

## 2019-01-14 RX ADMIN — HEPARIN SODIUM 5000 UNITS: 5000 INJECTION INTRAVENOUS; SUBCUTANEOUS at 06:09

## 2019-01-14 RX ADMIN — NYSTATIN: 100000 CREAM TOPICAL at 09:08

## 2019-01-14 RX ADMIN — HEPARIN SODIUM 5000 UNITS: 5000 INJECTION INTRAVENOUS; SUBCUTANEOUS at 13:17

## 2019-01-14 RX ADMIN — CALCIUM CARBONATE 500 MG (1,250 MG)-VITAMIN D3 200 UNIT TABLET 1 TABLET: at 08:57

## 2019-01-14 RX ADMIN — LOSARTAN POTASSIUM 100 MG: 50 TABLET, FILM COATED ORAL at 09:07

## 2019-01-14 RX ADMIN — LEVOTHYROXINE SODIUM 112 MCG: 112 TABLET ORAL at 06:09

## 2019-01-14 RX ADMIN — OXYCODONE HYDROCHLORIDE 2.5 MG: 5 TABLET ORAL at 01:17

## 2019-01-14 RX ADMIN — OXYCODONE HYDROCHLORIDE 2.5 MG: 5 TABLET ORAL at 08:58

## 2019-01-14 RX ADMIN — CLOPIDOGREL 75 MG: 75 TABLET, FILM COATED ORAL at 08:57

## 2019-01-14 RX ADMIN — OXYCODONE HYDROCHLORIDE 2.5 MG: 5 TABLET ORAL at 06:10

## 2019-01-14 RX ADMIN — DOCUSATE SODIUM 100 MG: 100 CAPSULE, LIQUID FILLED ORAL at 08:58

## 2019-01-14 RX ADMIN — CALCIUM CARBONATE 500 MG (1,250 MG)-VITAMIN D3 200 UNIT TABLET 1 TABLET: at 15:43

## 2019-01-14 RX ADMIN — AMLODIPINE BESYLATE 10 MG: 10 TABLET ORAL at 08:58

## 2019-01-14 RX ADMIN — OXYCODONE HYDROCHLORIDE 2.5 MG: 5 TABLET ORAL at 15:43

## 2019-01-14 RX ADMIN — ACETAMINOPHEN 975 MG: 325 TABLET ORAL at 06:09

## 2019-01-14 RX ADMIN — GABAPENTIN 600 MG: 300 CAPSULE ORAL at 21:01

## 2019-01-14 RX ADMIN — NYSTATIN 1 APPLICATION: 100000 POWDER TOPICAL at 18:09

## 2019-01-14 RX ADMIN — OXYCODONE HYDROCHLORIDE 2.5 MG: 5 TABLET ORAL at 11:35

## 2019-01-14 RX ADMIN — SENNOSIDES 8.6 MG: 8.6 TABLET, FILM COATED ORAL at 21:01

## 2019-01-14 RX ADMIN — ACETAMINOPHEN 975 MG: 325 TABLET ORAL at 21:00

## 2019-01-14 RX ADMIN — FUROSEMIDE 20 MG: 20 TABLET ORAL at 08:57

## 2019-01-14 NOTE — OCCUPATIONAL THERAPY NOTE
Occupational Therapy Treatment Note:       01/14/19 4598   Restrictions/Precautions   Braces or Orthoses C/S Collar   Other Precautions Cognitive   Pain Assessment   Pain Assessment 0-10   Pain Score 8   Pain Location Back   Pain Orientation Upper   ADL   Grooming Assistance 2  Maximal Assistance   Grooming Comments hair combing   LB Bathing Assistance 3  Moderate Assistance   UB Dressing Assistance 4  Minimal Assistance  (hosp gown (will  require more asst for overhead shirt))   LB Dressing Assistance 2  Maximal Assistance   Toileting Assistance  2  Maximal Assistance   Functional Standing Tolerance   Time 15 min at sinkside during ub bahting  pt demsontrates fair balance with rw and close sba    Transfers   Sit to Stand 4  Minimal assistance   Stand to Sit 4  Minimal assistance   Stand pivot 4  Minimal assistance   Additional items (rw)   Functional Mobility   Functional Mobility 4  Minimal assistance   Additional items Rolling walker   Cognition   Overall Cognitive Status Impaired   Arousal/Participation Alert; Cooperative   Attention Attends with cues to redirect   Following Commands Follows one step commands without difficulty   Comments pt requires increased time to complete adls/ mobility  Activity Tolerance   Activity Tolerance Patient tolerated treatment well   Assessment   Assessment pt participated in pm ot session and was seen focusing on complete am care seated for lb and in stance for ub and grooming at sinkside  pt requires increased time to walk to from bathroom  pt tolerated standing 15 min with fairly static balance  pt required max asst lb care and min asst ub  pt with c/o pain with touching of her upper back and of her r lower leg  pt toelrated session well, required increased time and min asst to stand from tall chair   pt required max asst for hair combing as she is unable to use ue's to comb hair per pt report  thus requireing max asst   Plan   Treatment Interventions ADL retraining;Functional transfer training; Activityengagement;Equipment evaluation/education;Patient/family training; Endurance training   Goal Expiration Date 01/25/19   Treatment Day 2   OT Frequency 3-5x/wk   Recommendation   OT Discharge Recommendation Short Term Rehab   Barthel Index   Feeding 5   Bathing 0   Grooming Score 0   Dressing Score 5   Bladder Score 5   Bowels Score 10   Toilet Use Score 5   Transfers (Bed/Chair) Score 10   Mobility (Level Surface) Score 0   Stairs Score 0   Barthel Index Score 40   Modified Rosy Scale   Modified Mellette Scale 4   April A SHAWN Jean-Baptiste

## 2019-01-14 NOTE — PROGRESS NOTES
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 pressures  - resumed Lasix, patient appears euvolemic  - increase Norvasc to 10 mg       Closed nondisplaced fracture of second cervical vertebra (HCC)   Assessment & Plan    - cervical collar per neurosurgery recs  - upright Xrays - stable  - analgesia: pt reporting she takes hydrocodone at home   - agreeable to current regimen here  - neuro intact    - PT/OT recommending rehab, CM assisting with placement      * 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)   Assessment & Plan    -see above       DVT prophylaxis: SCDs and subcutaneous heparin  PT and OT:  Eval and treat    Disposition:  DC pending Case Management  Subjective/Objective   Chief Complaint: "Patient offers no new complaints "    Subjective:  Patient offers no complaints and is resting in bed  Denies any new pain  Tolerating collar  Tolerating a diet      Objective:     Meds/Allergies   Prescriptions Prior to Admission   Medication Sig Dispense Refill Last Dose    albuterol (PROVENTIL HFA,VENTOLIN HFA) 90 mcg/act inhaler Inhale 2 puffs every 4 (four) hours as needed for wheezing       amLODIPine (NORVASC) 5 mg tablet Take 5 mg by mouth daily       calcium carbonate-vitamin D (OSCAL-D) 500 mg-200 units per tablet Take 1 tablet by mouth 2 (two) times a day with meals         calcium-vitamin D 250-100 MG-UNIT per tablet Take 1 tablet by mouth 2 (two) times a day       clopidogrel (PLAVIX) 75 mg tablet Take 75 mg by mouth daily       furosemide (LASIX) 20 mg tablet Take 20 mg by mouth daily       gabapentin (NEURONTIN) 300 mg capsule Take 300 mg by mouth 2 (two) times a day 1 CAPSULE IN AM (300MG) AND 2 CAPS AT HS (600MG)        HYDROcodone-acetaminophen (NORCO) 5-325 mg per tablet Take 1 tablet by mouth every 6 (six) hours as needed for pain       hydroxychloroquine (PLAQUENIL) 200 mg tablet Take 200 mg by mouth daily with breakfast       Levothyroxine Sodium 112 MCG CAPS Take by mouth daily       lidocaine (LIDODERM) 5 % Apply 2 patches topically daily Remove & Discard patch within 12 hours or as directed by MD         LORazepam (ATIVAN) 0 5 mg tablet Take 0 5 mg by mouth 2 (two) times a day as needed for anxiety         losartan (COZAAR) 100 MG tablet Take 100 mg by mouth daily       LOSARTAN POTASSIUM-HCTZ PO Take by mouth       metoprolol tartrate (LOPRESSOR) 25 mg tablet Take 25 mg by mouth every 12 (twelve) hours       Multiple Vitamins-Calcium (ONE-A-DAY WOMENS PO) Take by mouth       nitroglycerin (NITROSTAT) 0 4 mg SL tablet Place 0 4 mg under the tongue every 5 (five) minutes as needed for chest pain       Omega-3 Fatty Acids (FISH OIL) 1,000 mg Take 1,000 mg by mouth daily       potassium chloride (K-DUR,KLOR-CON) 10 mEq tablet Take 10 mEq by mouth 2 (two) times a day       Red Yeast Rice 600 MG CAPS Take by mouth daily       sertraline (ZOLOFT) 25 mg tablet Take 25 mg by mouth daily          Vitals: Blood pressure 169/54, pulse 56, temperature 98 1 °F (36 7 °C), resp  rate 18, height 4' 11 75" (1 518 m), weight 93 8 kg (206 lb 12 7 oz), SpO2 94 %  Body mass index is 40 72 kg/m²   SpO2: SpO2: 94 %, SpO2 Device: O2 Device: None (Room air)    ABG: No results found for: PHART, KAR3IBC, PO2ART, KZT6OKN, D5FNRQOT, BEART, SOURCE      Intake/Output Summary (Last 24 hours) at 01/14/19 1339  Last data filed at 01/14/19 1101   Gross per 24 hour   Intake              570 ml   Output              788 ml   Net             -218 ml Invasive Devices     Drain            External Urinary Catheter -- days                Nutrition/GI Proph/Bowel Reg:  Continue current diet    Physical Exam:   GENERAL APPEARANCE:  No acute distress, well-appearing  HEENT:  Currently in a collar; neurovascularly intact  CV:  Regular rate and rhythm  LUNGS:  CTA bilaterally; no rales or rhonchi  ABD:  Bowel sounds x4, nontender  EXT:  Edema has improved daily; neurovascularly intact; +2 pulses  NEURO:  Cranial nerves 2-12 intact, no focal deficits  SKIN:  Warm, dry, intact    Lab Results: Results: I have personally reviewed pertinent reports   , BMP/CMP: No results found for: SODIUM, K, CL, CO2, ANIONGAP, BUN, CREATININE, GLUCOSE, CALCIUM, AST, ALT, ALKPHOS, PROT, BILITOT, EGFR and CBC: No results found for: WBC, HGB, HCT, MCV, PLT, ADJUSTEDWBC, MCH, MCHC, RDW, MPV, NRBC  Imaging/EKG Studies: Results: I have personally reviewed pertinent reports  Other Studies:  No other studies  VTE Prophylaxis:  SCDs and subcutaneous heparin    Family updated today  Nurse rounds completed today

## 2019-01-14 NOTE — UTILIZATION REVIEW
Continued Stay Review    Date: 1/14/2019  Vital Signs: /54   Pulse 56   Temp 98 1 °F (36 7 °C)   Resp 18   Ht 4' 11 75" (1 518 m)   Wt 93 8 kg (206 lb 12 7 oz)   SpO2 94%   BMI 40 72 kg/m²   Assessment/Plan: delirium overnight - resolved in daytime  Resume home lasix as she has some wheezing and crackles-- increase norvasc  fx nodisplaced 2 cervical vertebrae- cervical collar--pt/ot re commends  Rehab  Type 3 fx odontoid fx    Medications:   Scheduled Meds:   Current Facility-Administered Medications:  acetaminophen 975 mg Oral Q8H [facility] Claudia Loredo PA-C   albuterol 2 puff Inhalation Q4H PRN Phil Johnson,    amLODIPine 10 mg Oral Daily Davey Mackey PA-C   calcium carbonate-vitamin D 1 tablet Oral BID With Meals Venda Manuel\[facility] [place] [facility]\"", CRNP   clopidogrel 75 mg Oral Daily [facility] Manuel\[facility] [place] [facility]\"", CRNP   docusate sodium 100 mg Oral BID Juan Pratt PA-C   fish oil 1,000 mg Oral [facility], CR[facility]   furosemide 20 mg Oral Daily Zamzam Hodgson, CRNP   gabapentin 300 mg Oral Daily Madeline Paredes MD   gabapentin 600 mg Oral HS Madeline Paredes MD   heparin (porcine) 5,000 Units Subcutaneous [facility] Anthony Montiel, LISSETTENP   hydrALAZINE 5 mg Intravenous Q6H PRN Genice Canard, CRNP   hydroxychloroquine 200 mg Oral Daily With Breakfast Ivane Mikayla, CRNP   levothyroxine 112 mcg Oral Early Morning [facility]Gall[facility] Cent[facility], DO   lidocaine 1 patch Topical Daily Venda Cra\[facility] [place] [facility]\"", CRNP   lidocaine 1 patch Topical Daily Tuesday M Haider, CRNP   losartan 100 mg Oral Daily [facility], DO   metoprolol tartrate 25 mg Oral Q12H [facility] Gwendjesus alberto Meng, DO   nystatin  Topical BID Lauriendjesus alberto Salinas, DO   nystatin  Topical BID Gagandeep Small PA-C   ondansetron 4 mg Intravenous Q4H PRN Nacho [facility], DO   oxyCODONE 2 5 mg Oral Q4H PRN Genice Canard, CRNP   oxyCODONE 2 5 mg Oral Q4H PRN NATHEN Coombs   polyethylene glycol 17 g Oral Daily PRN Tuesday Carlos NATHEN Bliss   senna 1 tablet Oral HS Tuesday M NATHEN Maloney   sertraline 25 mg Oral Daily Solectron Geenapp, ALICE     Continuous Infusions:    PRN Meds: albuterol    hydrALAZINE    ondansetron    oxyCODONEx4    oxyCODONExx4    polyethylene glycol  Pertinent Labs/Diagnostic Results:none  Age/Sex: 80 y o  female   Discharge Plan: insurance carrier closed for the weekend -- awaiting approval for [facility]

## 2019-01-14 NOTE — ASSESSMENT & PLAN NOTE
- cervical collar per neurosurgery recs  - upright Xrays - stable  - analgesia: pt reporting she takes hydrocodone at home   - agreeable to current regimen here  - neuro intact    - PT/OT recommending rehab, CM assisting with placement

## 2019-01-14 NOTE — SOCIAL WORK
Cm reviewed patient during care coordination rounds  Cm continues to work on discharge plan  Cm informed by [facility] liaison that patient's daughter is concerned about acute referral   Cm contacted daughter to speak about discharge plan  Daughter states she has concerns about moms ability to do acute therapy  Daughter plans to come in at 5pm today to discuss discharge options with patient  Daughter provided additional choices of [address] [place] [address]  Cm sent referrals and awaiting final decision from patient and patient's daughter

## 2019-01-14 NOTE — PLAN OF CARE
Problem: OCCUPATIONAL THERAPY ADULT  Goal: Performs self-care activities at highest level of function for planned discharge setting  See evaluation for individualized goals  Treatment Interventions: ADL retraining, Functional transfer training, Endurance training, Cognitive reorientation, Patient/family training, Equipment evaluation/education, Compensatory technique education, Energy conservation, Activityengagement          See flowsheet documentation for full assessment, interventions and recommendations  Outcome: Progressing  Limitation: Decreased ADL status, Decreased Safe judgement during ADL, Decreased cognition, Decreased endurance, Decreased self-care trans, Decreased high-level ADLs  Prognosis: Good  Assessment: pt participated in pm ot session and was seen focusing on complete am care seated for lb and in stance for ub and grooming at sinkside  pt requires increased time to walk to from bathroom  pt tolerated standing 15 min with fairly static balance  pt required max asst lb care and min asst ub  pt with c/o pain with touching of her upper back and of her r lower leg  pt toelrated session well, required increased time and min asst to stand from tall chair  pt required max asst for hair combing as she is unable to use ue's to comb hair per pt report  thus requireing max asst     OT Discharge Recommendation: Short Term Rehab  OT - OK to Discharge:  Yes  SHAWN Perez

## 2019-01-14 NOTE — ASSESSMENT & PLAN NOTE
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

## 2019-01-14 NOTE — TELEPHONE ENCOUNTER
01/28/2019-PT STILL IN HOSPITAL/PA-S ARE AWARE AND WILL FOLLOW-UP  01/28/2019 APT CANCELLED     01/25/2019-PT STILL IN HOSPITAL    01/24/2019-PT STILL IN HOSPITAL    01/23/2019-PT STILL IN HOSPITAL    01/22/2019-PT STILL IN HOSPITAL    01/21/2019-PT STILL IN HOSPITAL    01/15/2019-PT STILL IN HOSPITAL    01/14/2019-NORM (01/11/2019)SIGNED OFF WAS 2 WEEKS FOLLOW-UP WITH ANOTHER UPRIGHT CERVICAL SPINE XRAY      [facility]  01/28/2019 JOCE Dexter

## 2019-01-15 PROCEDURE — 97530 THERAPEUTIC ACTIVITIES: CPT

## 2019-01-15 PROCEDURE — 99232 SBSQ HOSP IP/OBS MODERATE 35: CPT | Performed by: EMERGENCY MEDICINE

## 2019-01-15 PROCEDURE — 97535 SELF CARE MNGMENT TRAINING: CPT

## 2019-01-15 RX ADMIN — OXYCODONE HYDROCHLORIDE 2.5 MG: 5 TABLET ORAL at 01:41

## 2019-01-15 RX ADMIN — AMLODIPINE BESYLATE 10 MG: 10 TABLET ORAL at 09:04

## 2019-01-15 RX ADMIN — CALCIUM CARBONATE 500 MG (1,250 MG)-VITAMIN D3 200 UNIT TABLET 1 TABLET: at 09:03

## 2019-01-15 RX ADMIN — OXYCODONE HYDROCHLORIDE 2.5 MG: 5 TABLET ORAL at 20:23

## 2019-01-15 RX ADMIN — HEPARIN SODIUM 5000 UNITS: 5000 INJECTION INTRAVENOUS; SUBCUTANEOUS at 06:32

## 2019-01-15 RX ADMIN — GABAPENTIN 600 MG: 300 CAPSULE ORAL at 21:32

## 2019-01-15 RX ADMIN — NYSTATIN: 100000 CREAM TOPICAL at 09:06

## 2019-01-15 RX ADMIN — DOCUSATE SODIUM 100 MG: 100 CAPSULE, LIQUID FILLED ORAL at 09:03

## 2019-01-15 RX ADMIN — METOPROLOL TARTRATE 25 MG: 25 TABLET, FILM COATED ORAL at 09:04

## 2019-01-15 RX ADMIN — LIDOCAINE 1 PATCH: 50 PATCH CUTANEOUS at 09:02

## 2019-01-15 RX ADMIN — FUROSEMIDE 20 MG: 20 TABLET ORAL at 09:05

## 2019-01-15 RX ADMIN — NYSTATIN: 100000 POWDER TOPICAL at 18:16

## 2019-01-15 RX ADMIN — GABAPENTIN 300 MG: 300 CAPSULE ORAL at 09:03

## 2019-01-15 RX ADMIN — CALCIUM CARBONATE 500 MG (1,250 MG)-VITAMIN D3 200 UNIT TABLET 1 TABLET: at 16:11

## 2019-01-15 RX ADMIN — ACETAMINOPHEN 975 MG: 325 TABLET ORAL at 21:32

## 2019-01-15 RX ADMIN — CLOPIDOGREL 75 MG: 75 TABLET, FILM COATED ORAL at 09:19

## 2019-01-15 RX ADMIN — OXYCODONE HYDROCHLORIDE 2.5 MG: 5 TABLET ORAL at 09:04

## 2019-01-15 RX ADMIN — NYSTATIN: 100000 POWDER TOPICAL at 09:06

## 2019-01-15 RX ADMIN — SERTRALINE HYDROCHLORIDE 25 MG: 25 TABLET ORAL at 21:32

## 2019-01-15 RX ADMIN — LEVOTHYROXINE SODIUM 112 MCG: 112 TABLET ORAL at 06:33

## 2019-01-15 RX ADMIN — HYDROXYCHLOROQUINE SULFATE 200 MG: 200 TABLET, FILM COATED ORAL at 09:06

## 2019-01-15 RX ADMIN — ACETAMINOPHEN 975 MG: 325 TABLET ORAL at 13:42

## 2019-01-15 RX ADMIN — ENOXAPARIN SODIUM 40 MG: 40 INJECTION SUBCUTANEOUS at 13:42

## 2019-01-15 RX ADMIN — OXYCODONE HYDROCHLORIDE 2.5 MG: 5 TABLET ORAL at 16:11

## 2019-01-15 RX ADMIN — METOPROLOL TARTRATE 25 MG: 25 TABLET, FILM COATED ORAL at 21:32

## 2019-01-15 RX ADMIN — ACETAMINOPHEN 975 MG: 325 TABLET ORAL at 06:32

## 2019-01-15 RX ADMIN — LOSARTAN POTASSIUM 100 MG: 50 TABLET, FILM COATED ORAL at 09:05

## 2019-01-15 RX ADMIN — OXYCODONE HYDROCHLORIDE 2.5 MG: 5 TABLET ORAL at 21:33

## 2019-01-15 RX ADMIN — LIDOCAINE 1 PATCH: 50 PATCH CUTANEOUS at 09:03

## 2019-01-15 RX ADMIN — OXYCODONE HYDROCHLORIDE 2.5 MG: 5 TABLET ORAL at 13:44

## 2019-01-15 NOTE — PHYSICAL THERAPY NOTE
Physical Therapy Progress Note     01/15/19 1156   Pain Assessment   Pain Assessment FLACC   Pain Rating: FLACC (Rest) - Face 0   Pain Rating: FLACC (Rest) - Legs 0   Pain Rating: FLACC (Rest) - Activity 0   Pain Rating: FLACC (Rest) - Cry 1   Pain Rating: FLACC (Rest) - Consolability 0   Score: FLACC (Rest) 1   Pain Rating: FLACC (Activity) - Face 1   Pain Rating: FLACC (Activity) - Legs 1   Pain Rating: FLACC (Activity) - Activity 1   Pain Rating: FLACC (Activity) - Cry 1   Pain Rating: FLACC (Activity) - Consolability 1   Score: FLACC (Activity) 5   Restrictions/Precautions   Braces or Orthoses C/S Collar   Other Precautions Fall Risk;Pain   Subjective   Subjective Pt pleasant and agreeable to treatment  family arrived as session began  Pt requested to use bathroom during session, and deferred ambulating in hallway due to fear of incontinence after realizing she had some diarrhea  Complained of increased neck & head pain during ambulation which resided with seated rests  Transfers   Sit to Stand 4  Minimal assistance   Additional items Assist x 1; Armrests; Increased time required   Stand to Sit 4  Minimal assistance   Additional items Assist x 1; Armrests; Increased time required   Stand pivot 4  Minimal assistance   Additional items Assist x 1; Armrests; Increased time required   Toilet transfer 4  Minimal assistance   Additional items Assist x 1; Armrests; Increased time required;Raised toilet seat   Ambulation/Elevation   Gait pattern Excessively slow; Short stride; Inconsistent cande; Shuffling;Decreased foot clearance; Antalgic;Poor UE support   Gait Assistance 4  Minimal assist   Additional items Assist x 1   Assistive Device Rolling walker   Distance 10' x 4   Balance   Static Sitting Fair   Static Standing Fair -   Ambulatory Poor +   Endurance Deficit   Endurance Deficit Yes   Endurance Deficit Description deconditioning & increased neck pain with ambulation   Activity Tolerance   Activity Tolerance Patient tolerated treatment well;Patient limited by fatigue;Patient limited by pain   Nurse Debbie Aguilar RN   Assessment   Prognosis Good   Problem List Decreased strength;Decreased range of motion;Decreased endurance; Impaired balance;Decreased mobility; Decreased coordination;Decreased safety awareness;Orthopedic restrictions;Pain   Assessment Pt continues to require assistance and supervision for all standing activities this session due to increased fatigue and pain with mobility tasks this session  Pt able to perform transfers under own power with min A to maintain safety, but requires excessive time to perform & instructions for decreased UE use to reduce pain  Pt then ambulated repeated short distances between bedside chair & toilet with short, shuffling steps, and slow pacing with no LOB or increased swaying noted  Pt complained of increased neck pain with mobility due to increased reliance on UEs during ambulation at this time  Pt provided with shorter RW during session to improve UE support with decreased shoulder elevation during ambulation  Pt reported it felt more comfortable and did not voice complaints of increased pain with use  Pt able to perform static standing for up to 1 minute for cleaning next to toilet, for which she was dependent  At end of session, pt returned to bedside recliner and required assist x 2 to fully boost back into chair as pt demonstrated difficulty doing so on her own  During session, spoke with pt's daughter regarding pt's mobilty status, and plan for discharge to [facility] at this time  Daughter verbalized agreement to all discussion points at this time with no further questions or concerns presently  Will continue to benefit from therapy services at this time to improve functional mobility, strength, endurance, and activity tolerance with decreased pain     Barriers to Discharge Inaccessible home environment   Goals   Patient Goals to go to rehab so she can get home   STG Expiration Date 01/20/19   Short Term Goal #1 as per PT eval on 1/10/19; 1  Modified Independent with Bed Mobility Rolling Right and Left 2  Modified Independent with Bed Mobility Supine-Sit 3  Modified Independent with Transfer Bed-Chair 4  Increase Dynamic Sitting Balance at least 1 Grade for improved stability with functional reach activities 5  Increase Dynamic Standing Balance at least 1 Grade for improved ease with Activities of Daily Living 6  Increase Lower Extremity Strength at least 1 Grade for improved ease mobility tasks 7  Modified Independent with Ambulation 200 feet using RW to facilitate home and community mobility   Treatment Day 3   Plan   Treatment/Interventions LE strengthening/ROM; Functional transfer training; Therapeutic exercise; Endurance training;Patient/family training;Equipment eval/education; Bed mobility;Gait training   Progress Progressing toward goals   PT Frequency (4-6x/week)   Recommendation   Recommendation Short-term skilled PT   Equipment Recommended Prosper Menjivar PTA

## 2019-01-15 NOTE — PROGRESS NOTES
Progress Note - Samir Ordoñez 1934, 80 y o  female MRN: [id number]    Unit/Bed#: [facility] 634-01 Encounter: [id number]    Primary Care Provider: Hair Franco DO   Date and time admitted to hospital: 1/9/2019  8:31 PM      59-year-old female status post fall    Closed nondisplaced fracture of second cervical vertebra (HCC)   Assessment & Plan    - cervical collar per neurosurgery recs  - upright Xrays - stable  - analgesia: pt reporting she takes hydrocodone at home   - agreeable to current regimen with oxycodone  - neuro intact    - PT/OT recommending rehab, CM assisting with placement - looking into [facility] with possible SNF back ups after they speak with daughter today     * Type III fracture of odontoid process [facility])   Assessment & Plan    -see above     C6 cervical fracture ([facility] [address] )   Assessment & Plan    - See above     Neck pain, acute   Assessment & Plan    Continue current pain regimen     Acute pain   Assessment & Plan    Continue current pain regimen this scheduled Tylenol p r n  Oxycodone  Continue bowel regimen, patient is having bowel movements     Ambulatory dysfunction   Assessment & Plan    Patient requires inpatient rehab, case management following regarding placement     Delirium   Assessment & Plan    - delirium overnight, resolved in the day time  - delirium precautions  - declined melatonin      Forgetfulness   Assessment & Plan    Inpatient rehab upon discharge     Physical deconditioning   Assessment & Plan    Inpatient rehab upon discharge     Hypertension   Assessment & Plan    - daughter reports that she is very sensitive to hypertension and 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 pressures  - resumed Lasix, patient appears euvolemic  - continue Norvasc at 10 mg       Hypothyroid   Assessment & Plan    On home Synthroid       Prophylaxis:  SCDs, change subcu heparin to Lovenox  Disposition:  Placement pending at skilled nursing facility versus [facility]    Case management following  Bedside nurse rounds completed with nurse Rebecca Argueta  Patient aware plan of care for the day  Chief Complaint: "I'm feeling pretty good"    Subjective: Patient's pain is controlled with current pain regimen  She slept well last night  Has no complaints this morning       Objective:     Meds/Allergies     Current Facility-Administered Medications:     acetaminophen (TYLENOL) tablet 975 mg, 975 mg, Oral, Q8H [address], Claudia Loredo PA-C, 975 mg at 01/15/19 1320    albuterol (PROVENTIL HFA,VENTOLIN HFA) inhaler 2 puff, 2 puff, Inhalation, Q4H PRN, Estrella Asif, DO, 2 puff at 01/10/19 2336    amLODIPine (NORVASC) tablet 10 mg, 10 mg, Oral, Daily, Jen De La Fuente PA-C, 10 mg at 01/15/19 0904    calcium carbonate-vitamin D (OSCAL-D) 500 mg-200 units per tablet 1 tablet, 1 tablet, Oral, BID With Meals, NATHEN Rahman, 1 tablet at 01/15/19 0903    clopidogrel (PLAVIX) tablet 75 mg, 75 mg, Oral, Daily, NATHEN Rahman, 75 mg at 01/15/19 0919    docusate sodium (COLACE) capsule 100 mg, 100 mg, Oral, BID, Jaquan Flores PA-C, 100 mg at 01/15/19 0903    enoxaparin (LOVENOX) subcutaneous injection 40 mg, 40 mg, Subcutaneous, Q24H [facility], Neha Lucio PA-C    fish oil capsule 1,000 mg, 1,000 mg, Oral, Daily, NATHEN Rahman, 1,000 mg at 01/12/19 0913    furosemide (LASIX) tablet 20 mg, 20 mg, Oral, Daily, NATHEN Bronw, 20 mg at 01/15/19 0905    gabapentin (NEURONTIN) capsule 300 mg, 300 mg, Oral, Daily, Jaguar Lepe MD, 300 mg at 01/15/19 6812    gabapentin (NEURONTIN) capsule 600 mg, 600 mg, Oral, HS, Jaguar Lepe MD, 600 mg at 01/14/19 2101    hydrALAZINE (APRESOLINE) injection 5 mg, 5 mg, Intravenous, Q6H PRN, NATHEN Brown, 5 mg at 01/11/19 2327    hydroxychloroquine (PLAQUENIL) tablet 200 mg, 200 mg, Oral, Daily With Breakfast, NATHEN Brown, 200 mg at 01/15/19 0906    levothyroxine tablet 112 mcg, 112 mcg, Oral, Early Morning, Kellen Nolan, DO, 112 mcg at 01/15/19 0633    lidocaine (LIDODERM) 5 % patch 1 patch, 1 patch, Topical, Daily, Kevyn Quiros, NATHEN, 1 patch at 01/15/19 0903    lidocaine (LIDODERM) 5 % patch 1 patch, 1 patch, Topical, Daily,  Miguelangel Asencio, LISSETTENP, 1 patch at 01/15/19 0902    losartan (COZAAR) tablet 100 mg, 100 mg, Oral, Daily, Rusty Lights, DO, 100 mg at 01/15/19 0905    metoprolol tartrate (LOPRESSOR) tablet 25 mg, 25 mg, Oral, Q12H [facility], Rusty Lights, DO, 25 mg at 01/15/19 9179    nystatin (MYCOSTATIN) cream, , Topical, BID, Rusty Lights, DO    nystatin (MYCOSTATIN) powder, , Topical, BID, Darius Small PA-C    ondansetron [facility]) injection 4 mg, 4 mg, Intravenous, Q4H PRN, Rusty Lights, DO, 4 mg at 01/10/19 1616    oxyCODONE (ROXICODONE) IR tablet 2 5 mg, 2 5 mg, Oral, Q4H PRN, NATHEN Brown, 2 5 mg at 19 0858    oxyCODONE (ROXICODONE) IR tablet 2 5 mg, 2 5 mg, Oral, Q4H PRN, NATHEN Brown, 2 5 mg at 01/15/19 0904    polyethylene glycol (MIRALAX) packet 17 g, 17 g, Oral, Daily PRN,  NATHEN Maloney    [facility]) tablet 8 6 mg, 1 tablet, Oral, HS,  NATHEN Maloney, 8 6 mg at 19    sertraline (ZOLOFT) tablet 25 mg, 25 mg, Oral, Daily, Freddy Cornelius PA-C, 25 mg at 19    Vitals: Blood pressure 128/61, pulse 58, temperature 98 2 °F (36 8 °C), resp  rate 19, height 4' 11 75" (1 518 m), weight 93 8 kg (206 lb 12 7 oz), SpO2 96 %  Body mass index is 40 72 kg/m²   SpO2: SpO2: 96 %, SpO2 Device: O2 Device: None (Room air)    ABG: No results found for: PHART, PGB0CNT, PO2ART, JQX7SRN, U8EBXUFI, BEART, SOURCE      Intake/Output Summary (Last 24 hours) at 01/15/19 0919  Last data filed at 01/15/19 0600   Gross per 24 hour   Intake              120 ml   Output             1213 ml   Net            -1093 ml       Invasive Devices     Drain            External Urinary Catheter -- days                          Nutrition/GI Proph/Bowel Re g sodium, regular house    Physical Exam:   GENERAL APPEARANCE:  No acute distress  HEENT:  Normocephalic, atraumatic  CV:  Regular rate and rhythm, no murmurs gallops or rubs  LUNGS:  Clear to auscultation bilaterally  ABD:  Soft, nontender, nondistended  EXT:  Moving all extremities equally  NEURO:  GCS 15, nonfocal exam; cervical collar in place  SKIN:  Pink, warm, dry      Lab Results: BMP/CMP: No results found for: SODIUM, K, CL, CO2, ANIONGAP, BUN, CREATININE, GLUCOSE, CALCIUM, AST, ALT, ALKPHOS, PROT, BILITOT, EGFR and CBC: No results found for: WBC, HGB, HCT, MCV, PLT, ADJUSTEDWBC, MCH, MCHC, RDW, MPV, NRBC  Imaging/EKG Studies: Results: I have personally reviewed pertinent reports      Other Studies:  No new  VTE Prophylaxis:  SCDs, subcu heparin

## 2019-01-15 NOTE — PLAN OF CARE
Problem: PHYSICAL THERAPY ADULT  Goal: Performs mobility at highest level of function for planned discharge setting  See evaluation for individualized goals  Treatment/Interventions: LE strengthening/ROM, Functional transfer training, Elevations, Therapeutic exercise, Endurance training, Cognitive reorientation, Patient/family training, Equipment eval/education, Bed mobility, Gait training, Compensatory technique education, Continued evaluation, Spoke to nursing  Equipment Recommended: [address] (wide )       See flowsheet documentation for full assessment, interventions and recommendations  Outcome: Progressing  Prognosis: Good  Problem List: Decreased strength, Decreased range of motion, Decreased endurance, Impaired balance, Decreased mobility, Decreased coordination, Decreased safety awareness, Orthopedic restrictions, Pain  Assessment: Pt continues to require assistance and supervision for all standing activities this session due to increased fatigue and pain with mobility tasks this session  Pt able to perform transfers under own power with min A to maintain safety, but requires excessive time to perform & instructions for decreased UE use to reduce pain  Pt then ambulated repeated short distances between bedside chair & toilet with short, shuffling steps, and slow pacing with no LOB or increased swaying noted  Pt complained of increased neck pain with mobility due to increased reliance on UEs during ambulation at this time  Pt provided with shorter RW during session to improve UE support with decreased shoulder elevation during ambulation  Pt reported it felt more comfortable and did not voice complaints of increased pain with use  Pt able to perform static standing for up to 1 minute for cleaning next to toilet, for which she was dependent  At end of session, pt returned to bedside recliner and required assist x 2 to fully boost back into chair as pt demonstrated difficulty doing so on her own  During session, spoke with pt's daughter regarding pt's mobilty status, and plan for discharge to [facility] at this time  Daughter verbalized agreement to all discussion points at this time with no further questions or concerns presently  Will continue to benefit from therapy services at this time to improve functional mobility, strength, endurance, and activity tolerance with decreased pain  Barriers to Discharge: Inaccessible home environment     Recommendation: Short-term skilled PT     PT - OK to Discharge: No (to rehab when medically stable)    See flowsheet documentation for full assessment

## 2019-01-15 NOTE — PLAN OF CARE
Problem: OCCUPATIONAL THERAPY ADULT  Goal: Performs self-care activities at highest level of function for planned discharge setting  See evaluation for individualized goals  Treatment Interventions: ADL retraining, Functional transfer training, Endurance training, Cognitive reorientation, Patient/family training, Equipment evaluation/education, Compensatory technique education, Energy conservation, Activityengagement          See flowsheet documentation for full assessment, interventions and recommendations  Outcome: Progressing  Limitation: Decreased ADL status, Decreased Safe judgement during ADL, Decreased cognition, Decreased endurance, Decreased self-care trans, Decreased high-level ADLs  Prognosis: Good  Assessment: Patient participated in skilled OT with focus on ADL skill training, functional transfer skills, dressing, bathing, hygiene skills  Patient limited by C/S collar for oral care with dentures, however, problem solved independently  Patient required assist levels as documented  Patient identified by name and   Patient would benefit from STR with focus on increasing funcitonal strength, increasing functional transfer skill training, increasing functional independence with ADL self care skills for carryover into her daily routine        OT Discharge Recommendation: Short Term Rehab  OT - OK to Discharge:  (when medically cleared)  Krysta Segundo

## 2019-01-15 NOTE — ASSESSMENT & PLAN NOTE
- cervical collar per neurosurgery recs  - upright Xrays - stable  - analgesia: pt reporting she takes hydrocodone at home   - agreeable to current regimen with oxycodone  - neuro intact    - PT/OT recommending rehab, CM assisting with placement - looking into [facility] with possible SNF back ups after they speak with daughter today

## 2019-01-15 NOTE — ASSESSMENT & PLAN NOTE
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

## 2019-01-15 NOTE — SOCIAL WORK
Auth obtained for [facility]  Unfortunately there are no beds at [facility] today and there will not be any until Thursday or Friday   CM will discuss with family in the morning to determine if we should pursue [facility]

## 2019-01-15 NOTE — ASSESSMENT & PLAN NOTE
Continue current pain regimen this scheduled Tylenol p r n   Oxycodone  Continue bowel regimen, patient is having bowel movements

## 2019-01-15 NOTE — PROGRESS NOTES
Spoke w/ trauma regarding PRN pain medication regimen  Told her orders need to be changed d/t same dose being used as moderate/severe pain and breakthrough pain with same time apart - Q4H, PRN  Was told she will change orders   Waiting to follow up

## 2019-01-15 NOTE — OCCUPATIONAL THERAPY NOTE
Occupational Therapy Treatment Note:       01/15/19 1142   Restrictions/Precautions   Braces or Orthoses C/S Collar   Other Precautions Cognitive   Pain Assessment   Pain Assessment 0-10   Pain Score Worst Possible Pain   Pain Type Acute pain   Pain Location Neck   ADL   Where Assessed Other (Comment)  (chair and commode)   Grooming Assistance 2  Maximal Assistance   Grooming Deficit Setup;Verbal cueing;Supervision/safety; Increased time to complete;Wash/dry face;Denture care   Grooming Comments (dentures difficult secondary to C/S collar)   UB Bathing Assistance 3  Moderate Assistance   UB Bathing Deficit Increased time to complete; Chest;Right arm;Left arm   LB Bathing Assistance 3  Moderate Assistance   LB Bathing Deficit Setup; Increased time to complete; Buttocks;Right lower leg including foot; Left lower leg including foot   UB Dressing Assistance 4  Minimal Assistance   UB Dressing Deficit Thread RUE  (hospital gown; increased assist with OH top)   LB Dressing Assistance 2  Maximal Assistance   LB Dressing Deficit Setup;Verbal cueing;Supervision/safety; Increased time to complete; Don/doff R sock; Don/doff L sock   Toileting Assistance  2  Maximal Assistance   Toileting Deficit Clothing management up;Clothing management down   Toileting Comments (perianal hygiene assist)   Functional Standing Tolerance   Time 12 minutes at sink   Activity static adl task   Comments at sink   Transfers   Sit to Stand 4  Minimal assistance   Additional items Assist x 1; Armrests; Increased time required;Verbal cues   Stand to Sit 4  Minimal assistance   Additional items Assist x 1; Armrests; Increased time required;Verbal cues   Stand pivot 4  Minimal assistance   Additional items Assist x 1; Increased time required;Verbal cues   Toilet transfer 4  Minimal assistance   Additional items Assist x 1; Increased time required   Functional Mobility   Functional Mobility 4  Minimal assistance   Additional items Rolling walker   Toilet Transfers Toilet Transfer From Rolling walker   Toilet Transfer Type To and from   Toilet Transfer to Raised toilet seat with rails   Toilet Transfer Technique Ambulating   Toilet Transfers Minimal assistance   Cognition   Overall Cognitive Status Impaired   Arousal/Participation Alert; Cooperative   Attention Attends with cues to redirect   Orientation Level Oriented X4   Memory Decreased recall of recent events;Decreased recall of precautions   Following Commands Follows one step commands without difficulty   Activity Tolerance   Activity Tolerance Patient tolerated treatment well   Medical Staff Made Aware ok to see per ZENAIDA Mendoza   Assessment   Assessment Patient participated in skilled OT with focus on ADL skill training, functional transfer skills, dressing, bathing, hygiene skills  Patient limited by C/S collar for oral care with dentures, however, problem solved independently  Patient required assist levels as documented  Patient identified by name and   Patient would benefit from STR with focus on increasing funcitonal strength, increasing functional transfer skill training, increasing functional independence with ADL self care skills for carryover into her daily routine      Plan   Treatment Interventions ADL retraining;Functional transfer training   Goal Expiration Date 19   Treatment Day 3   OT Frequency 3-5x/wk   Recommendation   OT Discharge Recommendation Short Term Rehab   OT - OK to Discharge (when medically cleared)   Danita Shea

## 2019-01-16 PROCEDURE — 99232 SBSQ HOSP IP/OBS MODERATE 35: CPT | Performed by: EMERGENCY MEDICINE

## 2019-01-16 PROCEDURE — 94762 N-INVAS EAR/PLS OXIMTRY CONT: CPT

## 2019-01-16 RX ADMIN — GABAPENTIN 600 MG: 300 CAPSULE ORAL at 21:29

## 2019-01-16 RX ADMIN — OXYCODONE HYDROCHLORIDE 2.5 MG: 5 TABLET ORAL at 17:29

## 2019-01-16 RX ADMIN — LIDOCAINE 1 PATCH: 50 PATCH CUTANEOUS at 08:03

## 2019-01-16 RX ADMIN — SERTRALINE HYDROCHLORIDE 25 MG: 25 TABLET ORAL at 21:28

## 2019-01-16 RX ADMIN — NYSTATIN: 100000 CREAM TOPICAL at 08:08

## 2019-01-16 RX ADMIN — HYDROXYCHLOROQUINE SULFATE 200 MG: 200 TABLET, FILM COATED ORAL at 08:07

## 2019-01-16 RX ADMIN — Medication 1000 MG: at 08:05

## 2019-01-16 RX ADMIN — ACETAMINOPHEN 975 MG: 325 TABLET ORAL at 05:17

## 2019-01-16 RX ADMIN — METOPROLOL TARTRATE 25 MG: 25 TABLET, FILM COATED ORAL at 21:29

## 2019-01-16 RX ADMIN — OXYCODONE HYDROCHLORIDE 2.5 MG: 5 TABLET ORAL at 10:59

## 2019-01-16 RX ADMIN — CALCIUM CARBONATE 500 MG (1,250 MG)-VITAMIN D3 200 UNIT TABLET 1 TABLET: at 17:28

## 2019-01-16 RX ADMIN — METOPROLOL TARTRATE 25 MG: 25 TABLET, FILM COATED ORAL at 08:08

## 2019-01-16 RX ADMIN — OXYCODONE HYDROCHLORIDE 2.5 MG: 5 TABLET ORAL at 21:29

## 2019-01-16 RX ADMIN — AMLODIPINE BESYLATE 10 MG: 10 TABLET ORAL at 08:06

## 2019-01-16 RX ADMIN — GABAPENTIN 300 MG: 300 CAPSULE ORAL at 08:05

## 2019-01-16 RX ADMIN — NYSTATIN: 100000 POWDER TOPICAL at 17:28

## 2019-01-16 RX ADMIN — LEVOTHYROXINE SODIUM 112 MCG: 112 TABLET ORAL at 05:17

## 2019-01-16 RX ADMIN — NYSTATIN: 100000 POWDER TOPICAL at 08:07

## 2019-01-16 RX ADMIN — OXYCODONE HYDROCHLORIDE 2.5 MG: 5 TABLET ORAL at 08:01

## 2019-01-16 RX ADMIN — ACETAMINOPHEN 975 MG: 325 TABLET ORAL at 21:28

## 2019-01-16 RX ADMIN — ACETAMINOPHEN 975 MG: 325 TABLET ORAL at 13:09

## 2019-01-16 RX ADMIN — CALCIUM CARBONATE 500 MG (1,250 MG)-VITAMIN D3 200 UNIT TABLET 1 TABLET: at 08:04

## 2019-01-16 RX ADMIN — OXYCODONE HYDROCHLORIDE 2.5 MG: 5 TABLET ORAL at 00:07

## 2019-01-16 RX ADMIN — OXYCODONE HYDROCHLORIDE 2.5 MG: 5 TABLET ORAL at 18:50

## 2019-01-16 RX ADMIN — OXYCODONE HYDROCHLORIDE 2.5 MG: 5 TABLET ORAL at 01:51

## 2019-01-16 RX ADMIN — LOSARTAN POTASSIUM 100 MG: 50 TABLET, FILM COATED ORAL at 08:06

## 2019-01-16 RX ADMIN — DOCUSATE SODIUM 100 MG: 100 CAPSULE, LIQUID FILLED ORAL at 08:05

## 2019-01-16 RX ADMIN — OXYCODONE HYDROCHLORIDE 2.5 MG: 5 TABLET ORAL at 13:10

## 2019-01-16 RX ADMIN — FUROSEMIDE 20 MG: 20 TABLET ORAL at 08:06

## 2019-01-16 RX ADMIN — CLOPIDOGREL 75 MG: 75 TABLET, FILM COATED ORAL at 08:05

## 2019-01-16 RX ADMIN — ENOXAPARIN SODIUM 40 MG: 40 INJECTION SUBCUTANEOUS at 08:04

## 2019-01-16 NOTE — SOCIAL WORK
Horace spoke to pt and her granddaughter regarding d/c plan  They requested I speak to the pt's dtr [facility]  HORACE spoke to [facility] and explained the current situation  [facility] appreciated the offer of the pt potentially going to [facility] but prefers to stay here and is set on going to [facility] for the rehab   Pt and family aware there is no available bed today but once one opens, the pt can d/c there

## 2019-01-16 NOTE — ASSESSMENT & PLAN NOTE
Patient requires inpatient rehab, case management following regarding placement  Plan to d/c to [facility] tomorrow

## 2019-01-16 NOTE — SOCIAL WORK
Spoke to [facility]  Bed available for the pt tomorrow   CM will follow up in the AM for bed assignment and time

## 2019-01-16 NOTE — ASSESSMENT & PLAN NOTE
- cervical collar per neurosurgery recs  - upright Xrays - stable  - analgesia: pt reporting she takes hydrocodone at home   - agreeable to current regimen with oxycodone  - neuro intact    - PT/OT recommending rehab, CM assisting with placement - D/C to [facility] tomorrow

## 2019-01-16 NOTE — PROGRESS NOTES
Progress Note - Krzysztof Granado 1934, 80 y o  female MRN: [id number]    Unit/Bed#: [facility] 634-01 Encounter: [id number]    Primary Care Provider: Sarah Ariza DO   Date and time admitted to hospital: 1/9/2019  8:31 PM        Closed nondisplaced fracture of second cervical vertebra (HCC)   Assessment & Plan    - cervical collar per neurosurgery recs  - upright Xrays - stable  - analgesia: pt reporting she takes hydrocodone at home   - agreeable to current regimen with oxycodone  - neuro intact    - PT/OT recommending rehab, CM assisting with placement - D/C to [facility] tomorrow     * Type III fracture of odontoid process ([facility] [address] )   Assessment & Plan    -see above     C6 cervical fracture (HCC)   Assessment & Plan    - See above     Neck pain, acute   Assessment & Plan    Continue current pain regimen     Acute pain   Assessment & Plan    Continue current pain regimen this scheduled Tylenol p r n  Oxycodone  Continue bowel regimen, patient is having bowel movements     Ambulatory dysfunction   Assessment & Plan    Patient requires inpatient rehab, case management following regarding placement  Plan to d/c to [facility] tomorrow  Delirium   Assessment & Plan    - much improved  - delirium precautions  - declined melatonin      Forgetfulness   Assessment & Plan    Inpatient rehab upon discharge  [facility] tomorrow  Physical deconditioning   Assessment & Plan    Inpatient rehab upon discharge     Hypertension   Assessment & Plan    - daughter reports that she is very sensitive to hypertension and 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 pressures  - resumed Lasix, patient appears euvolemic  - continue Norvasc at 10 mg       Hypothyroid   Assessment & Plan    On home Synthroid       Proph: SCDS, lovenox  Disp: D/C to [facility] tomorrow  Bedside nurse rounds completed with nurse Zarina Flood  Patient aware of plan of care for the day           Chief Complaint: "I'm doing okay"    Subjective: Patient's pain is controlled with the oxycodone  Notes pain in her neck  Slept well last night  Is aware of plan to go to [facility] tomorrow       Objective:     Meds/Allergies     Current Facility-Administered Medications:     acetaminophen (TYLENOL) tablet 975 mg, 975 mg, Oral, Q8H [facility], Claudia Loredo PA-C, 975 mg at 01/16/19 0517    albuterol (PROVENTIL HFA,VENTOLIN HFA) inhaler 2 puff, 2 puff, Inhalation, Q4H PRN, Estrella Asif, DO, 2 puff at 01/10/19 2336    amLODIPine (NORVASC) tablet 10 mg, 10 mg, Oral, Daily, Jen De La Fuente PA-C, 10 mg at 01/16/19 0806    calcium carbonate-vitamin D (OSCAL-D) 500 mg-200 units per tablet 1 tablet, 1 tablet, Oral, BID With Meals, NATHEN Rahman, 1 tablet at 01/16/19 0804    clopidogrel (PLAVIX) tablet 75 mg, 75 mg, Oral, Daily, NATHEN Rahman, 75 mg at 01/16/19 0805    docusate sodium (COLACE) capsule 100 mg, 100 mg, Oral, BID, Jaquan Flores PA-C, 100 mg at 01/16/19 0805    enoxaparin (LOVENOX) subcutaneous injection 40 mg, 40 mg, Subcutaneous, Q24H [facility], Neha Lucio PA-C, 40 mg at 01/16/19 0804    fish oil capsule 1,000 mg, 1,000 mg, Oral, Daily, NATHEN Rahman, 1,000 mg at 01/16/19 0805    furosemide (LASIX) tablet 20 mg, 20 mg, Oral, Daily, NATHEN Brown, 20 mg at 01/16/19 0806    gabapentin (NEURONTIN) capsule 300 mg, 300 mg, Oral, Daily, Jaguar Lepe MD, 300 mg at 01/16/19 0805    gabapentin (NEURONTIN) capsule 600 mg, 600 mg, Oral, HS, Jaguar Lepe MD, 600 mg at 01/15/19 2132    hydrALAZINE (APRESOLINE) injection 5 mg, 5 mg, Intravenous, Q6H PRN, NATHEN Brown, 5 mg at 01/11/19 2327    hydroxychloroquine (PLAQUENIL) tablet 200 mg, 200 mg, Oral, Daily With Breakfast, NATHEN Brown, 200 mg at 01/16/19 0807    levothyroxine tablet 112 mcg, 112 mcg, Oral, Early Morning, Kellen Nolan DO, 112 mcg at 01/16/19 0517    lidocaine (LIDODERM) 5 % patch 1 patch, 1 patch, Topical, Daily, NATHEN Rahman, 1 patch at 01/16/19 7205   lidocaine (LIDODERM) 5 % patch 1 patch, 1 patch, Topical, Daily, Tuesday M Argenis Alvarezian, [address], 1 patch at 01/16/19 0803    losartan (COZAAR) tablet 100 mg, 100 mg, Oral, Daily, Dasia Pace, DO, 100 mg at 01/16/19 6106    metoprolol tartrate (LOPRESSOR) tablet 25 mg, 25 mg, Oral, Q12H [address], Dasia Pace, DO, 25 mg at 01/16/19 0808    nystatin (MYCOSTATIN) cream, , Topical, BID, Dasia Pace, DO    nystatin (MYCOSTATIN) powder, , Topical, BID, Darius Small PA-C    ondansetron [facility]) injection 4 mg, 4 mg, Intravenous, Q4H PRN, Dasia Pace, DO, 4 mg at 01/10/19 6768    oxyCODONE (ROXICODONE) IR tablet 2 5 mg, 2 5 mg, Oral, Q4H PRN, NATHEN Brown, 2 5 mg at 01/16/19 1059    oxyCODONE (ROXICODONE) IR tablet 2 5 mg, 2 5 mg, Oral, Q4H PRN, NATHEN Brown, 2 5 mg at 01/16/19 0801    polyethylene glycol (MIRALAX) packet 17 g, 17 g, Oral, Daily PRN, Tuesday M NATHEN Maloney    [facility]) tablet 8 6 mg, 1 tablet, Oral, HS, Tuesday M NATHEN Maloney, 8 6 mg at 01/14/19 2101    sertraline (ZOLOFT) tablet 25 mg, 25 mg, Oral, Daily, Desmond Dixon PA-C, 25 mg at 01/15/19 2132    Vitals: Blood pressure (!) 174/76, pulse 56, temperature 98 2 °F (36 8 °C), resp  rate 20, height 4' 11 75" (1 518 m), weight 93 8 kg (206 lb 12 7 oz), SpO2 97 %  Body mass index is 40 72 kg/m²   SpO2: SpO2: 97 %, SpO2 Device: O2 Device: None (Room air)    ABG: No results found for: PHART, WXG7YLM, PO2ART, PSH1HZQ, C9PLWBWQ, BEART, SOURCE      Intake/Output Summary (Last 24 hours) at 01/16/19 1139  Last data filed at 01/16/19 1101   Gross per 24 hour   Intake              360 ml   Output             1500 ml   Net            -1140 ml       Invasive Devices     Drain            External Urinary Catheter -- days                          Nutrition/GI Proph/Bowel Reg: Regular      Physical Exam:   GENERAL APPEARANCE: NAD  HEENT: NCAT; + VISTA collar in place  CV: RRR, no MGR   LUNGS: CTA bilaterally  ABD: soft, non-tender, non-distended  EXT: moving all equally  NEURO: GCS 15, non-focal exam  SKIN: pink, warm, dry      Lab Results: BMP/CMP: No results found for: SODIUM, K, CL, CO2, ANIONGAP, BUN, CREATININE, GLUCOSE, CALCIUM, AST, ALT, ALKPHOS, PROT, BILITOT, EGFR and CBC: No results found for: WBC, HGB, HCT, MCV, PLT, ADJUSTEDWBC, MCH, MCHC, RDW, MPV, NRBC  Imaging/EKG Studies: Results: I have personally reviewed pertinent reports      Other Studies: no new    VTE Prophylaxis: SCDS, Lovenox

## 2019-01-17 ENCOUNTER — HOSPITAL ENCOUNTER (INPATIENT)
Facility: HOSPITAL | Age: 84
LOS: 20 days | Discharge: HOME HEALTH | DRG: 560 | End: 2019-02-06
Attending: PHYSICAL MEDICINE & REHABILITATION | Admitting: PHYSICAL MEDICINE & REHABILITATION
Payer: COMMERCIAL

## 2019-01-17 VITALS
WEIGHT: 206.79 LBS | DIASTOLIC BLOOD PRESSURE: 52 MMHG | BODY MASS INDEX: 40.6 KG/M2 | SYSTOLIC BLOOD PRESSURE: 143 MMHG | HEART RATE: 55 BPM | OXYGEN SATURATION: 97 % | TEMPERATURE: 99 F | HEIGHT: 60 IN | RESPIRATION RATE: 18 BRPM

## 2019-01-17 DIAGNOSIS — E03.9 HYPOTHYROIDISM, UNSPECIFIED TYPE: ICD-10-CM

## 2019-01-17 DIAGNOSIS — I50.32 CHRONIC DIASTOLIC HEART FAILURE (HCC): ICD-10-CM

## 2019-01-17 DIAGNOSIS — N18.9 CHRONIC KIDNEY DISEASE, UNSPECIFIED CKD STAGE: ICD-10-CM

## 2019-01-17 DIAGNOSIS — M79.7 FIBROMYALGIA: ICD-10-CM

## 2019-01-17 DIAGNOSIS — S12.120A CLOSED ODONTOID FRACTURE WITH TYPE III MORPHOLOGY, INITIAL ENCOUNTER (HCC): ICD-10-CM

## 2019-01-17 DIAGNOSIS — R26.2 AMBULATORY DYSFUNCTION: ICD-10-CM

## 2019-01-17 DIAGNOSIS — K59.01 SLOW TRANSIT CONSTIPATION: ICD-10-CM

## 2019-01-17 DIAGNOSIS — E87.1 HYPONATREMIA: ICD-10-CM

## 2019-01-17 DIAGNOSIS — S12.501D CLOSED NONDISPLACED FRACTURE OF SIXTH CERVICAL VERTEBRA WITH ROUTINE HEALING, UNSPECIFIED FRACTURE MORPHOLOGY, SUBSEQUENT ENCOUNTER: ICD-10-CM

## 2019-01-17 DIAGNOSIS — R52 PAIN: ICD-10-CM

## 2019-01-17 DIAGNOSIS — F32.A DEPRESSION, UNSPECIFIED DEPRESSION TYPE: ICD-10-CM

## 2019-01-17 DIAGNOSIS — M06.9 RHEUMATOID ARTHRITIS INVOLVING MULTIPLE SITES, UNSPECIFIED RHEUMATOID FACTOR PRESENCE: ICD-10-CM

## 2019-01-17 DIAGNOSIS — I15.0 RENOVASCULAR HYPERTENSION: Primary | ICD-10-CM

## 2019-01-17 DIAGNOSIS — Z95.2 AORTIC VALVE REPLACED: ICD-10-CM

## 2019-01-17 PROBLEM — Z95.3 HISTORY OF AORTIC VALVE REPLACEMENT WITH BIOPROSTHETIC VALVE: Status: ACTIVE | Noted: 2019-01-17

## 2019-01-17 PROBLEM — Z86.73 HISTORY OF CVA (CEREBROVASCULAR ACCIDENT): Status: ACTIVE | Noted: 2019-01-17

## 2019-01-17 PROBLEM — E11.42 TYPE 2 DIABETES MELLITUS WITH DIABETIC POLYNEUROPATHY (HCC): Status: ACTIVE | Noted: 2019-01-17

## 2019-01-17 PROBLEM — E11.9 TYPE 2 DIABETES MELLITUS (HCC): Status: ACTIVE | Noted: 2019-01-17

## 2019-01-17 LAB
GLUCOSE SERPL-MCNC: 143 MG/DL (ref 65–140)
GLUCOSE SERPL-MCNC: 178 MG/DL (ref 65–140)

## 2019-01-17 PROCEDURE — 99238 HOSP IP/OBS DSCHRG MGMT 30/<: CPT | Performed by: EMERGENCY MEDICINE

## 2019-01-17 PROCEDURE — 82948 REAGENT STRIP/BLOOD GLUCOSE: CPT

## 2019-01-17 PROCEDURE — 99223 1ST HOSP IP/OBS HIGH 75: CPT | Performed by: PHYSICAL MEDICINE & REHABILITATION

## 2019-01-17 RX ORDER — SENNOSIDES 8.6 MG
1 TABLET ORAL
Status: DISCONTINUED | OUTPATIENT
Start: 2019-01-17 | End: 2019-02-06 | Stop reason: HOSPADM

## 2019-01-17 RX ORDER — FUROSEMIDE 20 MG/1
20 TABLET ORAL DAILY
Status: CANCELLED | OUTPATIENT
Start: 2019-01-18

## 2019-01-17 RX ORDER — OXYCODONE HYDROCHLORIDE 5 MG/1
2.5 TABLET ORAL EVERY 4 HOURS PRN
Status: CANCELLED | OUTPATIENT
Start: 2019-01-17

## 2019-01-17 RX ORDER — HYDRALAZINE HYDROCHLORIDE 20 MG/ML
5 INJECTION INTRAMUSCULAR; INTRAVENOUS EVERY 6 HOURS PRN
Status: DISCONTINUED | OUTPATIENT
Start: 2019-01-17 | End: 2019-01-18

## 2019-01-17 RX ORDER — SERTRALINE HYDROCHLORIDE 25 MG/1
25 TABLET, FILM COATED ORAL DAILY
Status: CANCELLED | OUTPATIENT
Start: 2019-01-17

## 2019-01-17 RX ORDER — AMLODIPINE BESYLATE 10 MG/1
10 TABLET ORAL DAILY
Status: CANCELLED | OUTPATIENT
Start: 2019-01-18

## 2019-01-17 RX ORDER — LIDOCAINE 50 MG/G
1 PATCH TOPICAL DAILY
Status: CANCELLED | OUTPATIENT
Start: 2019-01-18

## 2019-01-17 RX ORDER — SENNOSIDES 8.6 MG
1 TABLET ORAL
Status: CANCELLED | OUTPATIENT
Start: 2019-01-17

## 2019-01-17 RX ORDER — LIDOCAINE 50 MG/G
1 PATCH TOPICAL DAILY
Status: DISCONTINUED | OUTPATIENT
Start: 2019-01-18 | End: 2019-02-06 | Stop reason: HOSPADM

## 2019-01-17 RX ORDER — LOSARTAN POTASSIUM 50 MG/1
100 TABLET ORAL DAILY
Status: DISCONTINUED | OUTPATIENT
Start: 2019-01-18 | End: 2019-01-24

## 2019-01-17 RX ORDER — CLOPIDOGREL BISULFATE 75 MG/1
75 TABLET ORAL DAILY
Status: CANCELLED | OUTPATIENT
Start: 2019-01-18

## 2019-01-17 RX ORDER — LEVOTHYROXINE SODIUM 112 UG/1
112 TABLET ORAL
Status: DISCONTINUED | OUTPATIENT
Start: 2019-01-18 | End: 2019-02-06 | Stop reason: HOSPADM

## 2019-01-17 RX ORDER — METHOCARBAMOL 500 MG/1
500 TABLET, FILM COATED ORAL EVERY 6 HOURS PRN
Status: DISCONTINUED | OUTPATIENT
Start: 2019-01-17 | End: 2019-01-24

## 2019-01-17 RX ORDER — OXYCODONE HYDROCHLORIDE 5 MG/1
2.5 TABLET ORAL EVERY 4 HOURS PRN
Status: DISCONTINUED | OUTPATIENT
Start: 2019-01-17 | End: 2019-01-24

## 2019-01-17 RX ORDER — AMLODIPINE BESYLATE 10 MG/1
10 TABLET ORAL DAILY
Status: DISCONTINUED | OUTPATIENT
Start: 2019-01-18 | End: 2019-01-24

## 2019-01-17 RX ORDER — HYDROXYCHLOROQUINE SULFATE 200 MG/1
200 TABLET, FILM COATED ORAL
Status: CANCELLED | OUTPATIENT
Start: 2019-01-18

## 2019-01-17 RX ORDER — POLYETHYLENE GLYCOL 3350 17 G/17G
17 POWDER, FOR SOLUTION ORAL DAILY PRN
Status: DISCONTINUED | OUTPATIENT
Start: 2019-01-17 | End: 2019-02-06 | Stop reason: HOSPADM

## 2019-01-17 RX ORDER — ALBUTEROL SULFATE 90 UG/1
2 AEROSOL, METERED RESPIRATORY (INHALATION) EVERY 4 HOURS PRN
Status: DISCONTINUED | OUTPATIENT
Start: 2019-01-17 | End: 2019-02-06 | Stop reason: HOSPADM

## 2019-01-17 RX ORDER — DOCUSATE SODIUM 100 MG/1
100 CAPSULE, LIQUID FILLED ORAL 2 TIMES DAILY
Status: DISCONTINUED | OUTPATIENT
Start: 2019-01-17 | End: 2019-02-06 | Stop reason: HOSPADM

## 2019-01-17 RX ORDER — POLYETHYLENE GLYCOL 3350 17 G/17G
17 POWDER, FOR SOLUTION ORAL DAILY PRN
Status: CANCELLED | OUTPATIENT
Start: 2019-01-17

## 2019-01-17 RX ORDER — CHLORAL HYDRATE 500 MG
1000 CAPSULE ORAL DAILY
Status: DISCONTINUED | OUTPATIENT
Start: 2019-01-18 | End: 2019-02-06 | Stop reason: HOSPADM

## 2019-01-17 RX ORDER — B-COMPLEX WITH VITAMIN C
1 TABLET ORAL 2 TIMES DAILY WITH MEALS
Status: CANCELLED | OUTPATIENT
Start: 2019-01-17

## 2019-01-17 RX ORDER — ACETAMINOPHEN 325 MG/1
975 TABLET ORAL EVERY 8 HOURS SCHEDULED
Status: DISCONTINUED | OUTPATIENT
Start: 2019-01-17 | End: 2019-02-06 | Stop reason: HOSPADM

## 2019-01-17 RX ORDER — ONDANSETRON 2 MG/ML
4 INJECTION INTRAMUSCULAR; INTRAVENOUS EVERY 4 HOURS PRN
Status: CANCELLED | OUTPATIENT
Start: 2019-01-17

## 2019-01-17 RX ORDER — DOCUSATE SODIUM 100 MG/1
100 CAPSULE, LIQUID FILLED ORAL 2 TIMES DAILY
Status: CANCELLED | OUTPATIENT
Start: 2019-01-17

## 2019-01-17 RX ORDER — B-COMPLEX WITH VITAMIN C
1 TABLET ORAL 2 TIMES DAILY WITH MEALS
Status: DISCONTINUED | OUTPATIENT
Start: 2019-01-18 | End: 2019-02-06 | Stop reason: HOSPADM

## 2019-01-17 RX ORDER — NYSTATIN 100000 [USP'U]/G
POWDER TOPICAL 2 TIMES DAILY
Status: CANCELLED | OUTPATIENT
Start: 2019-01-17

## 2019-01-17 RX ORDER — GABAPENTIN 300 MG/1
600 CAPSULE ORAL
Status: DISCONTINUED | OUTPATIENT
Start: 2019-01-17 | End: 2019-01-28

## 2019-01-17 RX ORDER — ACETAMINOPHEN 325 MG/1
975 TABLET ORAL EVERY 8 HOURS SCHEDULED
Status: CANCELLED | OUTPATIENT
Start: 2019-01-17

## 2019-01-17 RX ORDER — LOSARTAN POTASSIUM 50 MG/1
100 TABLET ORAL DAILY
Status: CANCELLED | OUTPATIENT
Start: 2019-01-18

## 2019-01-17 RX ORDER — NYSTATIN 100000 U/G
CREAM TOPICAL 2 TIMES DAILY
Status: CANCELLED | OUTPATIENT
Start: 2019-01-17

## 2019-01-17 RX ORDER — HYDROXYCHLOROQUINE SULFATE 200 MG/1
200 TABLET, FILM COATED ORAL
Status: DISCONTINUED | OUTPATIENT
Start: 2019-01-18 | End: 2019-02-06 | Stop reason: HOSPADM

## 2019-01-17 RX ORDER — OXYCODONE HYDROCHLORIDE 5 MG/1
2.5 TABLET ORAL EVERY 4 HOURS PRN
Status: DISCONTINUED | OUTPATIENT
Start: 2019-01-17 | End: 2019-01-21

## 2019-01-17 RX ORDER — LEVOTHYROXINE SODIUM 112 UG/1
112 TABLET ORAL
Status: CANCELLED | OUTPATIENT
Start: 2019-01-18

## 2019-01-17 RX ORDER — GABAPENTIN 300 MG/1
600 CAPSULE ORAL
Status: CANCELLED | OUTPATIENT
Start: 2019-01-17

## 2019-01-17 RX ORDER — NYSTATIN 100000 [USP'U]/G
POWDER TOPICAL 2 TIMES DAILY
Status: DISCONTINUED | OUTPATIENT
Start: 2019-01-17 | End: 2019-02-06 | Stop reason: HOSPADM

## 2019-01-17 RX ORDER — FUROSEMIDE 20 MG/1
20 TABLET ORAL DAILY
Status: DISCONTINUED | OUTPATIENT
Start: 2019-01-18 | End: 2019-01-24

## 2019-01-17 RX ORDER — METHOCARBAMOL 500 MG/1
500 TABLET, FILM COATED ORAL EVERY 6 HOURS PRN
Status: DISCONTINUED | OUTPATIENT
Start: 2019-01-17 | End: 2019-01-17 | Stop reason: HOSPADM

## 2019-01-17 RX ORDER — HYDRALAZINE HYDROCHLORIDE 20 MG/ML
5 INJECTION INTRAMUSCULAR; INTRAVENOUS EVERY 6 HOURS PRN
Status: CANCELLED | OUTPATIENT
Start: 2019-01-17

## 2019-01-17 RX ORDER — GABAPENTIN 300 MG/1
300 CAPSULE ORAL DAILY
Status: CANCELLED | OUTPATIENT
Start: 2019-01-18

## 2019-01-17 RX ORDER — METHOCARBAMOL 500 MG/1
500 TABLET, FILM COATED ORAL EVERY 6 HOURS PRN
Status: CANCELLED | OUTPATIENT
Start: 2019-01-17

## 2019-01-17 RX ORDER — GABAPENTIN 300 MG/1
300 CAPSULE ORAL DAILY
Status: DISCONTINUED | OUTPATIENT
Start: 2019-01-18 | End: 2019-01-19

## 2019-01-17 RX ORDER — SERTRALINE HYDROCHLORIDE 25 MG/1
25 TABLET, FILM COATED ORAL DAILY
Status: DISCONTINUED | OUTPATIENT
Start: 2019-01-17 | End: 2019-02-06 | Stop reason: HOSPADM

## 2019-01-17 RX ORDER — NYSTATIN 100000 U/G
CREAM TOPICAL 2 TIMES DAILY
Status: DISCONTINUED | OUTPATIENT
Start: 2019-01-17 | End: 2019-01-22

## 2019-01-17 RX ORDER — ONDANSETRON 2 MG/ML
4 INJECTION INTRAMUSCULAR; INTRAVENOUS EVERY 4 HOURS PRN
Status: DISCONTINUED | OUTPATIENT
Start: 2019-01-17 | End: 2019-01-18

## 2019-01-17 RX ORDER — CLOPIDOGREL BISULFATE 75 MG/1
75 TABLET ORAL DAILY
Status: DISCONTINUED | OUTPATIENT
Start: 2019-01-18 | End: 2019-02-06 | Stop reason: HOSPADM

## 2019-01-17 RX ORDER — CHLORAL HYDRATE 500 MG
1000 CAPSULE ORAL DAILY
Status: CANCELLED | OUTPATIENT
Start: 2019-01-18

## 2019-01-17 RX ORDER — ALBUTEROL SULFATE 90 UG/1
2 AEROSOL, METERED RESPIRATORY (INHALATION) EVERY 4 HOURS PRN
Status: CANCELLED | OUTPATIENT
Start: 2019-01-17

## 2019-01-17 RX ADMIN — GABAPENTIN 600 MG: 300 CAPSULE ORAL at 21:49

## 2019-01-17 RX ADMIN — OXYCODONE HYDROCHLORIDE 2.5 MG: 5 TABLET ORAL at 09:18

## 2019-01-17 RX ADMIN — AMLODIPINE BESYLATE 10 MG: 10 TABLET ORAL at 09:17

## 2019-01-17 RX ADMIN — OXYCODONE HYDROCHLORIDE 2.5 MG: 5 TABLET ORAL at 16:17

## 2019-01-17 RX ADMIN — LIDOCAINE 1 PATCH: 50 PATCH CUTANEOUS at 09:17

## 2019-01-17 RX ADMIN — OXYCODONE HYDROCHLORIDE 2.5 MG: 5 TABLET ORAL at 00:15

## 2019-01-17 RX ADMIN — OXYCODONE HYDROCHLORIDE 2.5 MG: 5 TABLET ORAL at 02:30

## 2019-01-17 RX ADMIN — METHOCARBAMOL 500 MG: 500 TABLET, FILM COATED ORAL at 11:19

## 2019-01-17 RX ADMIN — METOPROLOL TARTRATE 25 MG: 25 TABLET, FILM COATED ORAL at 21:48

## 2019-01-17 RX ADMIN — INSULIN LISPRO 1 UNITS: 100 INJECTION, SOLUTION INTRAVENOUS; SUBCUTANEOUS at 21:51

## 2019-01-17 RX ADMIN — FUROSEMIDE 20 MG: 20 TABLET ORAL at 09:17

## 2019-01-17 RX ADMIN — ACETAMINOPHEN 975 MG: 325 TABLET, FILM COATED ORAL at 21:48

## 2019-01-17 RX ADMIN — ENOXAPARIN SODIUM 40 MG: 40 INJECTION SUBCUTANEOUS at 09:15

## 2019-01-17 RX ADMIN — LOSARTAN POTASSIUM 100 MG: 50 TABLET, FILM COATED ORAL at 09:17

## 2019-01-17 RX ADMIN — NYSTATIN: 100000 CREAM TOPICAL at 09:23

## 2019-01-17 RX ADMIN — METOPROLOL TARTRATE 25 MG: 25 TABLET, FILM COATED ORAL at 09:17

## 2019-01-17 RX ADMIN — SERTRALINE HYDROCHLORIDE 25 MG: 25 TABLET ORAL at 21:49

## 2019-01-17 RX ADMIN — LEVOTHYROXINE SODIUM 112 MCG: 112 TABLET ORAL at 06:00

## 2019-01-17 RX ADMIN — NYSTATIN: 100000 POWDER TOPICAL at 21:48

## 2019-01-17 RX ADMIN — ACETAMINOPHEN 975 MG: 325 TABLET ORAL at 06:00

## 2019-01-17 RX ADMIN — ACETAMINOPHEN 975 MG: 325 TABLET ORAL at 13:20

## 2019-01-17 RX ADMIN — HYDROXYCHLOROQUINE SULFATE 200 MG: 200 TABLET, FILM COATED ORAL at 09:20

## 2019-01-17 RX ADMIN — CLOPIDOGREL 75 MG: 75 TABLET, FILM COATED ORAL at 09:17

## 2019-01-17 RX ADMIN — OXYCODONE HYDROCHLORIDE 2.5 MG: 5 TABLET ORAL at 14:07

## 2019-01-17 RX ADMIN — GABAPENTIN 300 MG: 300 CAPSULE ORAL at 09:17

## 2019-01-17 RX ADMIN — NYSTATIN: 100000 POWDER TOPICAL at 09:22

## 2019-01-17 RX ADMIN — CALCIUM CARBONATE 500 MG (1,250 MG)-VITAMIN D3 200 UNIT TABLET 1 TABLET: at 09:17

## 2019-01-17 NOTE — ASSESSMENT & PLAN NOTE
Patient participated in a comprehensive inpatient rehab program with 3-5 hours a day 5-7 days a week of PT/OT  To evaluate for any further SLP needs

## 2019-01-17 NOTE — ASSESSMENT & PLAN NOTE
Followed by IM/Nephro  Her BP goals were liberalized to 130/80  Her medication required adjustment  She is stable on the following regimen:  Amlodipine 5mg  Losartan 50mg  Continue metoprolol tartrate 25mg Q12  Continue Lasix 20mg daily     S/p stenting for DONAVAN

## 2019-01-17 NOTE — ASSESSMENT & PLAN NOTE
Lab Results   Component Value Date    HGBA1C 5 2 01/18/2019     - BG were well-controlled on a diabetic diet  She is diet controlled at home  We discontinued her accuchecks/CDI

## 2019-01-17 NOTE — DISCHARGE SUMMARY
Discharge- Heide Cornea 1934, 80 y o  female MRN: [id number]    [facility] Encounter: [id number]    Primary Care Provider: Nicole Klinefelter, DO   Date and time admitted to hospital: 1/9/2019  8:31 PM      81 y/o female s/p fall    Closed nondisplaced fracture of second cervical vertebra (HCC) and C6 fracture   Assessment & Plan    - cervical collar per neurosurgery recs  - upright Xrays - stable  - analgesia: pt reporting she takes hydrocodone at home   - agreeable to current regimen with oxycodone and will add methocarbamol prn today  - neuro intact    - PT/OT recommending rehab, CM assisting with placement - D/C to [facility] today     * Type III fracture of odontoid process ([facility] [address] )   Assessment & Plan    -see above     C6 cervical fracture (HCC)   Assessment & Plan    - See above     Neck pain, acute   Assessment & Plan    Continue current pain regimen, adding methocarbamol prn     Acute pain   Assessment & Plan    Continue current pain regimen this scheduled Tylenol p r n  Oxycodone  Continue bowel regimen, patient is having bowel movements     Ambulatory dysfunction   Assessment & Plan    Patient requires inpatient rehab, case management following regarding placement  Plan to d/c to [facility] today  Delirium   Assessment & Plan    - much improved  - delirium precautions  - declined melatonin      Forgetfulness   Assessment & Plan    Inpatient rehab upon discharge  [facility] today  Physical deconditioning   Assessment & Plan    Inpatient rehab upon discharge     Hypertension   Assessment & Plan    - daughter reports that she is very sensitive to hypertension and 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 pressures  - resumed Lasix, patient appears euvolemic  - continue Norvasc at 10 mg       Hypothyroid   Assessment & Plan    On home Synthroid         Bedside nurse rounds completed with nurse Rose Rodriguez  Patient aware of the plan of care for the day           Resolved Problems  Date Reviewed: 1/17/2019    None          Admission Date:   Admission Orders     Ordered        01/09/19 2352  Inpatient Admission  Once               Admitting Diagnosis: Multiple injuries [T07  XXXA]  Closed nondisplaced fracture of second cervical vertebra ([address] ) [S12 101A]    HPI: As per Meg Sosa PA-C who evaluated the patient on admission, "Karlene Hernandez is a 80 y o  female who presents with  Fall and neck pain on Plavix  Denies LOC  Mechanical fall after slip while walking with two canes  "    Procedures Performed: No orders of the defined types were placed in this encounter  Summary of Hospital Course:   Patient was admitted to the trauma service due to findings of C2 and C6 fracture  She was seen by neurosurgery who evaluated the patient and recommended non-operative management in a VISTA collar  She remained neurologically intact and her pain was controlled  She was able to tolerate a regular diet  She was hypertensive and her amlodipine was increased to 10 mg daily  She was up and ambulatory PT/OT who evaluated he patient and recommended discharge to inpatient rehab  She is being discharged to [facility] today  She will f/u with neurosurgery in 2 weeks with repeat x-rays at that time and should f/u with her PCP as well regarding her hypertension  This morning she notes neck pain intermittently  Offered a muscle relaxant and she is willing to try this  Ready to go to [facility]  Exam:   GEN: NAD  HEENT: + VISTA collar  NEURO: GCS 15, non-focal exam  CV: RRR, no MGR  PULM: CTA bilaterally  GI: soft, non-tender, non-distended   : voiding   MSK: moving all equally  SKIN: pink, warm, dry    Significant Findings, Care, Treatment and Services Provided:   Xr Chest Portable    Result Date: 1/12/2019  Impression: Mild CHF with trace bilateral pleural effusions similar to prior study  Workstation performed: [id number]     Xr Spine Cervical 2 Or 3 Vw Injury    Result Date: 1/11/2019  Impression: 1    Grossly stable type III odontoid process fracture as previously characterized  2   Multilevel degenerative change and evidence of diffuse idiopathic skeletal hypertrophy  Workstation performed: [id number]     Trauma - Ct Head Wo Contrast    Result Date: 1/9/2019  Impression: No acute intracranial abnormality  Workstation performed: [id number]     Cta Neck W Wo Contrast    Result Date: 1/9/2019  Impression: No evidence of aneurysm or dissection  Type 3 dens fracture with fracture component extending to the left C2 transverse foramen  Nondisplaced fracture anterior T6 bridging osteophyte  Areas of atherosclerotic plaque narrowing as described above  Workstation performed: [id number]     Trauma - Ct Spine Cervical Wo Contrast    Result Date: 1/9/2019  Impression: 1  Type III odontoid fracture  The fracture line extends to the right and left superior articular facets and left transverse foramen of C2  2   Acute nondisplaced fracture through bulky, bridging osteophytes at the C6 level  I personally discussed this study with Sasha Lockhart on 1/9/2019 at 8:59 PM   Workstation performed: [id number]     Xr Chest 1 View    Result Date: 1/10/2019  Impression: Borderline enlargement of cardiac silhouette  Status post median sternotomy  No acute pulmonary disease  Workstation performed: [id number]     Xr Trauma Multiple    Result Date: 1/10/2019  Impression: Borderline enlargement of cardiac silhouette  Status post median sternotomy  No acute pulmonary disease  Workstation performed: [id number]     Xr Chest Portable Icu    Result Date: 1/10/2019  Impression: 1  Bibasilar opacities compatible with effusions and a component of atelectasis or infection  2   Stable mild enlargement of cardiac silhouette  Workstation performed: [id number]       Complications: none    Condition at Discharge: good         Discharge instructions/Information to patient and family:   See after visit summary for information provided to patient and family  Provisions for Follow-Up Care:  See after visit summary for information related to follow-up care and any pertinent home health orders  PCP: Brandon Giles DO    [facility] Readmission: No    Discharge Statement   I spent 30 minutes discharging the patient  This time was spent on the day of discharge  I had direct contact with the patient on the day of discharge  Additional documentation is required if more than 30 minutes were spent on discharge  Discharge Medications:  See after visit summary for reconciled discharge medications provided to patient and family

## 2019-01-17 NOTE — ASSESSMENT & PLAN NOTE
Old R BG stroke appreciated on recent CT head   Cannot tolerate statins  Continue Plavix  Continue fish oil  Can restart red yeast rice at discharge

## 2019-01-17 NOTE — SOCIAL WORK
Pt's bed available at [facility]  Pt will d/c there today @1500 and go into room 973   CM left a voicemail for pt's dtr Haven Cap

## 2019-01-17 NOTE — PROGRESS NOTES
PHYSICAL MEDICINE AND REHABILITATION   PREADMISSION ASSESSMENT     Projected [facility] Diagnoses:  Impairment of mobility, safety, Activities of Daily Living (ADLs), and cognitive/communication skills due to Orthopedic Disorders:  08 9  Other Orthopedic     Etiology: Traumatic Type 3 Odontoid Fracture  Date of Onset: 1/9/19   Date of surgery: N/A    PATIENT INFORMATION  Name: Mey Toth Phone #: [phone] (home)   Address: [address] [place], [place] [address]  YOB: 1934 Age: 80 y o  SS#   Marital Status: /Civil Union  Ethnicity:   Employment Status: retired  Extended Emergency Contact Information  Primary Emergency Contact: [address] of [place] Phone: [phone]  Relation: Daughter  Advance Directive: Level 1 Full Code, AD Unknown    INSURANCE/COVERAGE:     Primary Payor: [employer] [address] / Plan: [facility] MEDICARE ADVANTAGE [address] / Product Type: Medicare HMO /   Secondary Payer: Private Pay    Payer Contact: Bro Palacios Payer Contact:   Contact Phone: [phone]  Contact Fax: [phone] Contact Phone:   Authorization #: [id number]  Coverage Dates:1/17-1/23  LCD: 1/23 with follow up same day  Medicare #: [id number]    Medical Record #: [id number]    REFERRAL SOURCE:   Referring provider: Harleen Reyes DO  Referring facility: [address] [place] [address]   Room: [address] [place] [address]/[facility] 185-11  PCP: Viet Ro DO PCP phone number: [phone]    MEDICAL INFORMATION  HPI: Patient is an 80year old female who originally presented to the [address] [place] [address] on 1/9/19 s/p fall with complaints of neck pain   At that time she was walking with the use of 2 canes   The patient denies LOC, although does admit to hitting her head   A CTH was done, however it was found to be negative   The patient was however found to have a Type III Odontoid fracture extending to right and left superior articular facets and left transverse foramen   There was also an acute non-displaced C6 fracture noted   The patient was treated non-operatively and placed in a VISTA collar, which is to be worn at all times, with spinal precautions   On 1/10/19 the patient offered complaints of right sided chest pain  A chest xray was done which revealed bibasilar opacities consistent with effusing and a component of atelectasis or infection   Her home dosage of Lasix was then resumed  During her hospital course she was noted to by hypertensive and her Amlodipine dosage was then increased  (Of note, the daughter did state during the patients hospital course that the patient is very sensitive to hypertension and that she has had 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 due to her high blood pressures)  At this time the patient is being cleared by her attending for discharge to rehab and both PT/OT are recommending inpatient acute rehab  Insurance authorization has been obtained and the patient will transfer to [address] [place] [address] in [facility] later on today  Past Medical History:   Past Surgical History: Allergies:     Past Medical History:   Diagnosis Date    CHF (congestive heart failure) ([facility] [address] )     Hypertension     Stroke [facility])     History reviewed  No pertinent surgical history  Allergies   Allergen Reactions    Duloxetine Hcl      Cymbalta;  Hemorrhagic stroke listed as reaction    Anastrozole     Exemestane      Aromasin; Muscle pain & cramps    Lexapro [Escitalopram]      Urinary retention    Lyrica [Pregabalin]      hypertension    Metformin      diarrhea    Statins      Muscle pain & cramps    Tramadol      hypertension    Triprolidine-Pseudoephedrine          Comorbidities: S/p fal, nondisplaced C6 fracture, nondisplaced C2 fracture, acute neck pain, delirium, forgetfulness , and history of: HTN, b/l lower extremity edema, anemia, constipation, hyponatremia, median sternotomy, CVA on plavix, hypothyroid, Dm, depression, AVR, left TKA, and arthritis     CURRENT VITAL SIGNS:   Temp:  [97 7 °F (36 5 °C)-98 1 °F (36 7 °C)] 98 1 °F (36 7 °C)  HR:  [52-55] 55  Resp:  [18-20] 20  BP: (167-184)/(66-74) 184/66   Intake/Output Summary (Last 24 hours) at 01/17/19 1139  Last data filed at 01/17/19 0612   Gross per 24 hour   Intake              480 ml   Output             1125 ml   Net             -645 ml        LABORATORY RESULTS:      Lab Results   Component Value Date    HGB 10 1 (L) 01/11/2019    HCT 31 6 (L) 01/11/2019    WBC 6 58 01/11/2019     Lab Results   Component Value Date    BUN 20 01/12/2019    K 3 6 01/12/2019    CL 97 (L) 01/12/2019    GLUCOSE 178 (H) 01/09/2019    CREATININE 0 98 01/12/2019     Lab Results   Component Value Date    PROTIME 13 5 01/10/2019    INR 1 02 01/10/2019        DIAGNOSTIC STUDIES:  Xr Chest Portable    Result Date: 1/12/2019  Impression: Mild CHF with trace bilateral pleural effusions similar to prior study  Workstation performed: [id number]     Xr Spine Cervical 2 Or 3 Vw Injury    Result Date: 1/11/2019  Impression: 1  Grossly stable type III odontoid process fracture as previously characterized  2   Multilevel degenerative change and evidence of diffuse idiopathic skeletal hypertrophy  Workstation performed: [id number]     Trauma - Ct Head Wo Contrast    Result Date: 1/9/2019  Impression: No acute intracranial abnormality  Workstation performed: [id number]     Cta Neck W Wo Contrast    Result Date: 1/9/2019  Impression: No evidence of aneurysm or dissection  Type 3 dens fracture with fracture component extending to the left C2 transverse foramen  Nondisplaced fracture anterior T6 bridging osteophyte  Areas of atherosclerotic plaque narrowing as described above  Workstation performed: [id number]     Trauma - Ct Spine Cervical Wo Contrast    Result Date: 1/9/2019  Impression: 1  Type III odontoid fracture    The fracture line extends to the right and left superior articular facets and left transverse foramen of C2  2   Acute nondisplaced fracture through bulky, bridging osteophytes at the C6 level  I personally discussed this study with Marta Lee on 1/9/2019 at 8:59 PM   Workstation performed: [id number]     Xr Chest 1 View    Result Date: 1/10/2019  Impression: Borderline enlargement of cardiac silhouette  Status post median sternotomy  No acute pulmonary disease  Workstation performed: [id number]     Xr Trauma Multiple    Result Date: 1/10/2019  Impression: Borderline enlargement of cardiac silhouette  Status post median sternotomy  No acute pulmonary disease  Workstation performed: [id number]     Xr Chest Portable Icu    Result Date: 1/10/2019  Impression: 1  Bibasilar opacities compatible with effusions and a component of atelectasis or infection  2   Stable mild enlargement of cardiac silhouette   Workstation performed: [id number]       PRECAUTIONS/SPECIAL NEEDS:  Tobacco:   History   Smoking Status    Unknown If Ever Smoked   Smokeless Tobacco    Never Used   , Alcohol:    History   Alcohol Use No   , Splints/Braces: C/S collar at all times, Anticoagulation:  clopidogrel 75 mg orally every day and Lovenox 40mg QD, Blood Sugar Management: as per MD, Edema Management, Safety Concerns, Pain Management, Bladder Incontinence: # of accidents 3 in 3 days, Dietary Restrictions: Sodium 2 Grams, Pain, and Fall Precautions    MEDICATIONS:     Current Facility-Administered Medications:     acetaminophen (TYLENOL) tablet 975 mg, 975 mg, Oral, Q8H [address], Claudia Loredo PA-C, 975 mg at 01/17/19 0600    albuterol (PROVENTIL HFA,VENTOLIN HFA) inhaler 2 puff, 2 puff, Inhalation, Q4H PRN, Brennan Reis, DO, 2 puff at 01/10/19 2336    amLODIPine (NORVASC) tablet 10 mg, 10 mg, Oral, Daily, Disha Small PA-C, 10 mg at 01/17/19 2136    calcium carbonate-vitamin D (OSCAL-D) 500 mg-200 units per tablet 1 tablet, 1 tablet, Oral, BID With Meals, NATHEN Oswald, 1 tablet at 01/17/19 3016    clopidogrel (PLAVIX) tablet 75 mg, 75 mg, Oral, Daily, Dykes LISSETTE CanNP, 75 mg at 01/17/19 0917    docusate sodium (COLACE) capsule 100 mg, 100 mg, Oral, BID, Raghav Ferrer PA-C, 100 mg at 01/16/19 0805    enoxaparin (LOVENOX) subcutaneous injection 40 mg, 40 mg, Subcutaneous, Q24H [address], Alena Mcmahon PA-C, 40 mg at 01/17/19 0915    fish oil capsule 1,000 mg, 1,000 mg, Oral, Daily, Dykesdwight Can, NATHEN, 1,000 mg at 01/16/19 0805    furosemide (LASIX) tablet 20 mg, 20 mg, Oral, Daily, NATHEN Brown, 20 mg at 01/17/19 7698    gabapentin (NEURONTIN) capsule 300 mg, 300 mg, Oral, Daily, Irma Durham MD, 300 mg at 01/17/19 7812    gabapentin (NEURONTIN) capsule 600 mg, 600 mg, Oral, HS, Irma Durham MD, 600 mg at 01/16/19 2129    hydrALAZINE (APRESOLINE) injection 5 mg, 5 mg, Intravenous, Q6H PRN, NATHEN Brown, 5 mg at 01/11/19 2327    hydroxychloroquine (PLAQUENIL) tablet 200 mg, 200 mg, Oral, Daily With Breakfast, NATHEN Brown, 200 mg at 01/17/19 0920    levothyroxine tablet 112 mcg, 112 mcg, Oral, Early Morning, Gilmar Majanoshorn, DO, 112 mcg at 01/17/19 0600    lidocaine (LIDODERM) 5 % patch 1 patch, 1 patch, Topical, Daily, Dykes NATHEN Can, 1 patch at 01/17/19 0917    lidocaine (LIDODERM) 5 % patch 1 patch, 1 patch, Topical, Daily, Tuesday M NATHEN Maloney, 1 patch at 01/17/19 9007    losartan (COZAAR) tablet 100 mg, 100 mg, Oral, Daily, Crelucio Jess, DO, 100 mg at 01/17/19 2847    methocarbamol (ROBAXIN) tablet 500 mg, 500 mg, Oral, Q6H PRN, Alena Mcmahon PA-C, 500 mg at 01/17/19 1119    metoprolol tartrate (LOPRESSOR) tablet 25 mg, 25 mg, Oral, Q12H [address], Creig Jess, DO, 25 mg at 01/17/19 9105    nystatin (MYCOSTATIN) cream, , Topical, BID, Creig Jess, DO    nystatin (MYCOSTATIN) powder, , Topical, BID, Darius Small PA-C    ondansetron [facility]) injection 4 mg, 4 mg, Intravenous, Q4H PRN, Creig Jess, DO, 4 mg at 01/10/19 9165    oxyCODONE (ROXICODONE) IR tablet 2 5 mg, 2 5 mg, Oral, Q4H PRN, NATHEN Brown, 2 5 mg at 01/17/19 0918    oxyCODONE (ROXICODONE) IR tablet 2 5 mg, 2 5 mg, Oral, Q4H PRN, Zamzam Hodgson, NATHEN, 2 5 mg at 01/17/19 0230    polyethylene glycol (MIRALAX) packet 17 g, 17 g, Oral, Daily PRN, Tuesday M Haider, CRNP    [facility]) tablet 8 6 mg, 1 tablet, Oral, HS, Tuesday M NATHEN Maloney, 8 6 mg at 01/14/19 2101    sertraline (ZOLOFT) tablet 25 mg, 25 mg, Oral, Daily, Darren Li PA-C, 25 mg at 01/16/19 2128    SKIN INTEGRITY:   Redness under breasts  PRIOR LEVEL OF FUNCTION:  She lives in a(n) single family home  Karlene Hernandez is  and lives with their family  Self Care: Independent, Indoor Mobility: Modified independnet , Stairs (in/outdoor): modified independent and Cognition: Needed some help    HOME ENVIRONMENT:  The living area: can live on 1 level  There Ramp to enter the home  The patient will have 24 hour supervision/physical assistance available upon discharge  Spoke with patient's daughter Aminata Weeks who has stated that she works from home Tuesdays and Thursdays and has an adult daughter who can also assist and supervise  Daughter has stated that she will make arrangements for 24/7 supervision for her mother prior to her discharge from rehab      PREVIOUS DME:  Equipment in home (previous DME): Grab Bars, Rolling Walker, Single [employer] and Built in [address]:  Physical Therapy Occupational Therapy Speech Therapy   As per PTA:      01/15/19 1156   Pain Assessment   Pain Assessment FLACC   Pain Rating: FLACC (Rest) - Face 0   Pain Rating: FLACC (Rest) - Legs 0   Pain Rating: FLACC (Rest) - Activity 0   Pain Rating: FLACC (Rest) - Cry 1   Pain Rating: FLACC (Rest) - Consolability 0   Score: FLACC (Rest) 1   Pain Rating: FLACC (Activity) - Face 1   Pain Rating: FLACC (Activity) - Legs 1   Pain Rating: FLACC (Activity) - Activity 1   Pain Rating: FLACC (Activity) - Cry 1   Pain Rating: FLACC (Activity) - Consolability 1   Score: FLACC (Activity) 5   Restrictions/Precautions   Braces or Orthoses C/S Collar   Other Precautions Fall Risk;Pain   Subjective   Subjective Pt pleasant and agreeable to treatment  family arrived as session began  Pt requested to use bathroom during session, and deferred ambulating in hallway due to fear of incontinence after realizing she had some diarrhea  Complained of increased neck & head pain during ambulation which resided with seated rests  Transfers   Sit to Stand 4  Minimal assistance   Additional items Assist x 1; Armrests; Increased time required   Stand to Sit 4  Minimal assistance   Additional items Assist x 1; Armrests; Increased time required   Stand pivot 4  Minimal assistance   Additional items Assist x 1; Armrests; Increased time required   Toilet transfer 4  Minimal assistance   Additional items Assist x 1; Armrests; Increased time required;Raised toilet seat   Ambulation/Elevation   Gait pattern Excessively slow; Short stride; Inconsistent cande; Shuffling;Decreased foot clearance; Antalgic;Poor UE support   Gait Assistance 4  Minimal assist   Additional items Assist x 1   Assistive Device Rolling walker   Distance 10' x 4   Balance   Static Sitting Fair   Static Standing Fair -   Ambulatory Poor +   Endurance Deficit   Endurance Deficit Yes   Endurance Deficit Description deconditioning & increased neck pain with ambulation   Activity Tolerance   Activity Tolerance Patient tolerated treatment well;Patient limited by fatigue;Patient limited by pain   Nurse [address]   Assessment   Prognosis Good   Problem List Decreased strength;Decreased range of motion;Decreased endurance; Impaired balance;Decreased mobility; Decreased coordination;Decreased safety awareness;Orthopedic restrictions;Pain   Assessment Pt continues to require assistance and supervision for all standing activities this session due to increased fatigue and pain with mobility tasks this session  Pt able to perform transfers under own power with min A to maintain safety, but requires excessive time to perform & instructions for decreased UE use to reduce pain  Pt then ambulated repeated short distances between bedside chair & toilet with short, shuffling steps, and slow pacing with no LOB or increased swaying noted  Pt complained of increased neck pain with mobility due to increased reliance on UEs during ambulation at this time  Pt provided with shorter RW during session to improve UE support with decreased shoulder elevation during ambulation  Pt reported it felt more comfortable and did not voice complaints of increased pain with use  Pt able to perform static standing for up to 1 minute for cleaning next to toilet, for which she was dependent  At end of session, pt returned to bedside recliner and required assist x 2 to fully boost back into chair as pt demonstrated difficulty doing so on her own  During session, spoke with pt's daughter regarding pt's mobilty status, and plan for discharge to [facility] at this time  Daughter verbalized agreement to all discussion points at this time with no further questions or concerns presently  Will continue to benefit from therapy services at this time to improve functional mobility, strength, endurance, and activity tolerance with decreased pain  As per ESCOBAR:       01/15/19 1142   Restrictions/Precautions   Braces or Orthoses C/S Collar   Other Precautions Cognitive   Pain Assessment   Pain Assessment 0-10   Pain Score Worst Possible Pain   Pain Type Acute pain   Pain Location Neck   ADL   Where Assessed Other (Comment)  (chair and commode)   Grooming Assistance 2  Maximal Assistance   Grooming Deficit Setup;Verbal cueing;Supervision/safety; Increased time to complete;Wash/dry face;Denture care   Grooming Comments (dentures difficult secondary to C/S collar)   UB Bathing Assistance 3  Moderate Assistance UB Bathing Deficit Increased time to complete; Chest;Right arm;Left arm   LB Bathing Assistance 3  Moderate Assistance   LB Bathing Deficit Setup; Increased time to complete; Buttocks;Right lower leg including foot; Left lower leg including foot   UB Dressing Assistance 4  Minimal Assistance   UB Dressing Deficit Thread RUE  (hospital gown; increased assist with [place] top)   LB Dressing Assistance 2  Maximal Assistance   LB Dressing Deficit Setup;Verbal cueing;Supervision/safety; Increased time to complete; Don/doff R sock; Don/doff L sock   Toileting Assistance  2  Maximal Assistance   Toileting Deficit Clothing management up;Clothing management down   Toileting Comments (perianal hygiene assist)   Functional Standing Tolerance   Time 12 minutes at sink   Activity static adl task   Comments at sink   Transfers   Sit to Stand 4  Minimal assistance   Additional items Assist x 1; Armrests; Increased time required;Verbal cues   Stand to Sit 4  Minimal assistance   Additional items Assist x 1; Armrests; Increased time required;Verbal cues   Stand pivot 4  Minimal assistance   Additional items Assist x 1; Increased time required;Verbal cues   Toilet transfer 4  Minimal assistance   Additional items Assist x 1; Increased time required   Functional Mobility   Functional Mobility 4  Minimal assistance   Additional items Rolling walker   Toilet Transfers   Toilet Transfer From Rolling walker   Toilet Transfer Type To and from   Toilet Transfer to Raised toilet seat with rails   Toilet Transfer Technique Ambulating   Toilet Transfers Minimal assistance   Cognition   Overall Cognitive Status Impaired   Arousal/Participation Alert; Cooperative   Attention Attends with cues to redirect   Orientation Level Oriented X4   Memory Decreased recall of recent events;Decreased recall of precautions   Following Commands Follows one step commands without difficulty   Activity Tolerance   Activity Tolerance Patient tolerated treatment well   Medical Staff Made Aware ok to see per ZENAIDA Romano   Assessment   Assessment Patient participated in skilled OT with focus on ADL skill training, functional transfer skills, dressing, bathing, hygiene skills  Patient limited by C/S collar for oral care with dentures, however, problem solved independently  Patient required assist levels as documented  Patient identified by name and   Patient would benefit from STR with focus on increasing funcitonal strength, increasing functional transfer skill training, increasing functional independence with ADL self care skills for carryover into her daily routine  N/A     CURRENT GAP IN FUNCTION  Prior to admission the patient was modified independent with transfers and ambulation with cane/rolling walkerand was independent with ADLs  IADLs were completed by her family  Estimated length of stay: 7 to 10 days    Anticipated Post-Discharge Disposition/Treatment  Disposition: Return to previous home/apartment  Outpatient Services: Physical Therapy (PT) and Occupational Therapy (OT)    BARRIERS TO DISCHARGE  Lovenox, Weakness, Pain, Diminished cognition/Mentation change, Balance Difficulty, Fatigue, Home Accessibility, Caregiver Accessibility, Financial Resources, Equipment Needs and Resource Availability    INTERVENTIONS FOR DISCHARGE  Adaptive equipment, Patient/Family/Caregiver Education, [employer], Financial Assistance, Arrange DME needs, Medication Changes as per MD, Therapy exercises and Center of balance support     REQUIRED THERAPY:  Patient will require PT and OT 90 minutes each per day, five days per week to achieve rehab goals       REQUIRED FUNCTIONAL AND MEDICAL MANAGEMENT FOR INPATIENT REHABILITATION:  Skin:  Grab Bars, Rolling Walker, [facility] [place], Pain Management: Overall pain is well controlled, Deep Vein Thrombosis (DVT) Prophylaxis:  low molecular weight heparin, IVC filter and Plavix daily, Diabetes Management: continue sliding scale insulin, patient to do finger sticks as ordered, SLIM to continue to manage diabetes, and additional medical conditions, nursing management for education, PM&R to maximize function and provide medical over sight, PT/OT intervention, patient and family education and training, and any consults as needed  RECOMMENDED LEVEL OF CARE:  Patient is an 80year old female who originally presented to the [address] [place] [address] on 1/9/19 s/p fall with complaints of neck pain   The patient was found to have a Type III Odontoid fracture extending to right and left superior articular facets and left transverse foramen   There was also an acute non-displaced C6 fracture noted   The patient was treated non-operatively and placed in a VISTA collar  Prior to admission the patient was independent with ADLS and required assist with IADLS   She was able to ambulate with the use of a cane/rolling walker   At this time the patient requires min assist with transfers and ambulation   She is able to ambulate with the use of a rolling walker and is going 10 x 4   The patient also requires mod assist with UB ADLS and mod to max assist with LB ADLS    At this time close medical management and PM&R management is recommended for the patient while on the [facility] to help monitor labs as well as other medical conditions   Nursing management will be required to monitor bowel/bladder function to prevent incontinent episodes and skin breakdown as well as education on medication changes   Inpatient acute rehab is recommended at this time for the patient to maximize overall strength, endurance, self care, and mobility upon discharge to home with the support of her family

## 2019-01-17 NOTE — ASSESSMENT & PLAN NOTE
Had an episode of HERLINDA  She was followed by nephrology, and this was felt to be due to increased BP control  Crea at discharge was 1 26  - Baseline crea ~ 1    - We monitored her labs on Lasix  - Renally dosed medications  - Avoiding nephrotoxic medications  - She was instructed to get a BMP checked 1 week after discharge to monitor her sodium levels and kidney function on Lasix, and to follow-up with her PCP  - At discharge, I contacted daughter to let her know that her fluid restriction was lifted, and that Jenny Paiz should get her BMP rechecked as above  - Nephrology has signed off and will follow-up with her at discharge on an as needed basis

## 2019-01-17 NOTE — ASSESSMENT & PLAN NOTE
Continue sertraline  Her gabapentin was titrated up to 600mg BID, which she is tolerating  Consult neuropsychology for adjustment and coping skills for pain management

## 2019-01-17 NOTE — ASSESSMENT & PLAN NOTE
- cervical collar per neurosurgery recs  - upright Xrays - stable  - analgesia: pt reporting she takes hydrocodone at home   - agreeable to current regimen with oxycodone and will add methocarbamol prn today  - neuro intact    - PT/OT recommending rehab, CM assisting with placement - D/C to [facility] today

## 2019-01-17 NOTE — ASSESSMENT & PLAN NOTE
She maintained her cervical precautions and C-Collar ATC  - 1/28 Follow-up XR x2 showed increased anterior displacement compared to 1/11   - Neurosurgery reviewed, and felt that fracture appeared stable   - Patient remained neurologically intact with functional improvement  - Plan to follow-up 2/13 with repeat XR and Neurosurgery

## 2019-01-17 NOTE — ASSESSMENT & PLAN NOTE
Monitor cardiopulmonary status  IM to manage  - Cath without serious cardiac dz prior to her surgery

## 2019-01-17 NOTE — ASSESSMENT & PLAN NOTE
Patient requires inpatient rehab, case management following regarding placement  Plan to d/c to [facility] today

## 2019-01-17 NOTE — PROGRESS NOTES
Pt care rounding with Kaitlyn Loza - plan is for patient to be discharged to [facility] today , robaxin ordered

## 2019-01-17 NOTE — PLAN OF CARE
Problem: Potential for Falls  Goal: Patient will remain free of falls  INTERVENTIONS:  - Assess patient frequently for physical needs  -  Identify cognitive and physical deficits and behaviors that affect risk of falls    -  [place] fall precautions as indicated by assessment   - Educate patient/family on patient safety including physical limitations  - Instruct patient to call for assistance with activity based on assessment  - Modify environment to reduce risk of injury  - Consider OT/PT consult to assist with strengthening/mobility   Outcome: Progressing      Problem: Prexisting or High Potential for Compromised Skin Integrity  Goal: Skin integrity is maintained or improved  INTERVENTIONS:  - Identify patients at risk for skin breakdown  - Assess and monitor skin integrity  - Assess and monitor nutrition and hydration status  - Monitor labs (i e  albumin)  - Assess for incontinence   - Turn and reposition patient  - Assist with mobility/ambulation  - Relieve pressure over bony prominences  - Avoid friction and shearing  - Provide appropriate hygiene as needed including keeping skin clean and dry  - Evaluate need for skin moisturizer/barrier cream  - Collaborate with interdisciplinary team (i e  Nutrition, Rehabilitation, etc )   - Patient/family teaching   Outcome: Progressing      Problem: PAIN - ADULT  Goal: Verbalizes/displays adequate comfort level or baseline comfort level  Interventions:  - Encourage patient to monitor pain and request assistance  - Assess pain using appropriate pain scale  - Administer analgesics based on type and severity of pain and evaluate response  - Implement non-pharmacological measures as appropriate and evaluate response  - Consider cultural and social influences on pain and pain management  - Notify physician/advanced practitioner if interventions unsuccessful or patient reports new pain   Outcome: Progressing      Problem: INFECTION - ADULT  Goal: Absence or prevention of progression during hospitalization  INTERVENTIONS:  - Assess and monitor for signs and symptoms of infection  - Monitor lab/diagnostic results  - Monitor all insertion sites, i e  indwelling lines, tubes, and drains  - Monitor endotracheal (as able) and nasal secretions for changes in amount and color  - [place] appropriate cooling/warming therapies per order  - Administer medications as ordered  - Instruct and encourage patient and family to use good hand hygiene technique  - Identify and instruct in appropriate isolation precautions for identified infection/condition   Outcome: Progressing      Problem: SAFETY ADULT  Goal: Maintain or return to baseline ADL function  INTERVENTIONS:  -  Assess patient's ability to carry out ADLs; assess patient's baseline for ADL function and identify physical deficits which impact ability to perform ADLs (bathing, care of mouth/teeth, toileting, grooming, dressing, etc )  - Assess/evaluate cause of self-care deficits   - Assess range of motion  - Assess patient's mobility; develop plan if impaired  - Assess patient's need for assistive devices and provide as appropriate  - Encourage maximum independence but intervene and supervise when necessary  ¯ Involve family in performance of ADLs  ¯ Assess for home care needs following discharge   ¯ Request OT consult to assist with ADL evaluation and planning for discharge  ¯ Provide patient education as appropriate   Outcome: Progressing    Goal: Maintain or return mobility status to optimal level  INTERVENTIONS:  - Assess patient's baseline mobility status (ambulation, transfers, stairs, etc )    - Identify cognitive and physical deficits and behaviors that affect mobility  - Identify mobility aids required to assist with transfers and/or ambulation (gait belt, sit-to-stand, lift, walker, cane, etc )  - [place] fall precautions as indicated by assessment  - Record patient progress and toleration of activity level on Mobility SBAR; progress patient to next Phase/Stage  - Instruct patient to call for assistance with activity based on assessment  - Request Rehabilitation consult to assist with strengthening/weightbearing, etc    Outcome: Progressing      Problem: DISCHARGE PLANNING  Goal: Discharge to home or other facility with appropriate resources  INTERVENTIONS:  - Identify barriers to discharge w/patient and caregiver  - Arrange for needed discharge resources and transportation as appropriate  - Identify discharge learning needs (meds, wound care, etc )  - Arrange for interpretive services to assist at discharge as needed  - Refer to Case Management Department for coordinating discharge planning if the patient needs post-hospital services based on physician/advanced practitioner order or complex needs related to functional status, cognitive ability, or social support system   Outcome: Progressing      Problem: Knowledge Deficit  Goal: Patient/family/caregiver demonstrates understanding of disease process, treatment plan, medications, and discharge instructions  Complete learning assessment and assess knowledge base  Interventions:  - Provide teaching at level of understanding  - Provide teaching via preferred learning methods   Outcome: Progressing      Problem: Nutrition/Hydration-ADULT  Goal: Nutrient/Hydration intake appropriate for improving, restoring or maintaining nutritional needs  Monitor and assess patient's nutrition/hydration status for malnutrition (ex- brittle hair, bruises, dry skin, pale skin and conjunctiva, muscle wasting, smooth red tongue, and disorientation)  Collaborate with interdisciplinary team and initiate plan and interventions as ordered  Monitor patient's weight and dietary intake as ordered or per policy  Utilize nutrition screening tool and intervene per policy  Determine patient's food preferences and provide high-protein, high-caloric foods as appropriate       INTERVENTIONS:  - Monitor oral intake, urinary output, labs, and treatment plans  - Assess nutrition and hydration status and recommend course of action  - Evaluate amount of meals eaten  - Assist patient with eating if necessary   - Allow adequate time for meals  - Recommend/ encourage appropriate diets, oral nutritional supplements, and vitamin/mineral supplements  - Order, calculate, and assess calorie counts as needed  - Recommend, monitor, and adjust tube feedings and TPN/PPN based on assessed needs  - Assess need for intravenous fluids  - Provide specific nutrition/hydration education as appropriate  - Include patient/family/caregiver in decisions related to nutrition   Outcome: Progressing      Problem: NEUROSENSORY - ADULT  Goal: Achieves stable or improved neurological status  INTERVENTIONS  - Monitor and report changes in neurological status  - Initiate measures to prevent increased intracranial pressure  - Maintain blood pressure and fluid volume within ordered parameters to optimize cerebral perfusion  - Monitor temperature, glucose, and sodium or any other associated labs   Initiate appropriate interventions as ordered  - Monitor for seizure activity   - Administer anti-seizure medications as ordered   Outcome: Progressing    Goal: Absence of seizures  INTERVENTIONS  - Monitor for seizure activity  - Administer anti-seizure medications as ordered  - Monitor neurological status   Outcome: Progressing    Goal: Remains free of injury related to seizures activity  INTERVENTIONS  - Maintain airway, patient safety  and administer oxygen as ordered  - Monitor patient for seizure activity, document and report duration and description of seizure to physician/advanced practitioner  - If seizure occurs,  ensure patient safety during seizure  - Reorient patient post seizure  - Seizure pads on all 4 side rails  - Instruct patient/family to notify RN of any seizure activity including if an aura is experienced  - Instruct patient/family to call for assistance with activity based on nursing assessment  - Administer anti-seizure medications as ordered  - Monitor fetal well being   Outcome: Progressing    Goal: Achieves maximal functionality and self care  INTERVENTIONS  - Monitor swallowing and airway patency with patient fatigue and changes in neurological status  - Encourage and assist patient to increase activity and self care with guidance from rehab services  - Encourage visually impaired, hearing impaired and aphasic patients to use assistive/communication devices   Outcome: Progressing      Problem: METABOLIC, FLUID AND ELECTROLYTES - ADULT  Goal: Electrolytes maintained within normal limits  INTERVENTIONS:  - Monitor labs and assess patient for signs and symptoms of electrolyte imbalances  - Administer electrolyte replacement as ordered  - Monitor response to electrolyte replacements, including repeat lab results as appropriate  - Instruct patient on fluid and nutrition as appropriate   Outcome: Progressing    Goal: Fluid balance maintained  INTERVENTIONS:  - Monitor labs and assess for signs and symptoms of volume excess or deficit  - Monitor I/O and WT  - Instruct patient on fluid and nutrition as appropriate   Outcome: Progressing    Goal: Glucose maintained within target range  INTERVENTIONS:  - Monitor Blood Glucose as ordered  - Assess for signs and symptoms of hyperglycemia and hypoglycemia  - Administer ordered medications to maintain glucose within target range  - Assess nutritional intake and initiate nutrition service referral as needed   Outcome: Progressing      Problem: SKIN/TISSUE INTEGRITY - ADULT  Goal: Skin integrity remains intact  INTERVENTIONS  - Identify patients at risk for skin breakdown  - Assess and monitor skin integrity  - Assess and monitor nutrition and hydration status  - Monitor labs (i e  albumin)  - Assess for incontinence   - Turn and reposition patient  - Assist with mobility/ambulation  - Relieve pressure over bony prominences  - Avoid friction and shearing  - Provide appropriate hygiene as needed including keeping skin clean and dry  - Evaluate need for skin moisturizer/barrier cream  - Collaborate with interdisciplinary team (i e  Nutrition, Rehabilitation, etc )   - Patient/family teaching   Outcome: Progressing    Goal: Incision(s), wounds(s) or drain site(s) healing without S/S of infection  INTERVENTIONS  - Assess and document risk factors for skin impairment   - Assess and document dressing, incision, wound bed, drain sites and surrounding tissue  - Initiate Nutrition services consult and/or wound management as needed   Outcome: Progressing    Goal: Oral mucous membranes remain intact  INTERVENTIONS  - Assess oral mucosa and hygiene practices  - Implement preventative oral hygiene regimen  - Implement oral medicated treatments as ordered  - Initiate Nutrition services referral as needed   Outcome: Progressing      Problem: HEMATOLOGIC - ADULT  Goal: Maintains hematologic stability  INTERVENTIONS  - Assess for signs and symptoms of bleeding or hemorrhage  - Monitor labs  - Administer supportive blood products/factors as ordered and appropriate   Outcome: Progressing      Problem: MUSCULOSKELETAL - ADULT  Goal: Maintain or return mobility to safest level of function  INTERVENTIONS:  - Assess patient's ability to carry out ADLs; assess patient's baseline for ADL function and identify physical deficits which impact ability to perform ADLs (bathing, care of mouth/teeth, toileting, grooming, dressing, etc )  - Assess/evaluate cause of self-care deficits   - Assess range of motion  - Assess patient's mobility; develop plan if impaired  - Assess patient's need for assistive devices and provide as appropriate  - Encourage maximum independence but intervene and supervise when necessary  - Involve family in performance of ADLs  - Assess for home care needs following discharge   - Request OT consult to assist with ADL evaluation and planning for discharge  - Provide patient education as appropriate   Outcome: Progressing    Goal: Maintain proper alignment of affected body part  INTERVENTIONS:  - Support, maintain and protect limb and body alignment  - Provide pt/fam with appropriate education   Outcome: Progressing      Problem: DISCHARGE PLANNING - CARE MANAGEMENT  Goal: Discharge to post-acute care or home with appropriate resources  INTERVENTIONS:  - Conduct assessment to determine patient/family and health care team treatment goals, and need for post-acute services based on payer coverage, community resources, and patient preferences, and barriers to discharge  - Address psychosocial, clinical, and financial barriers to discharge as identified in assessment in conjunction with the patient/family and health care team  - Arrange appropriate level of post-acute services according to patient's   needs and preference and payer coverage in collaboration with the physician and health care team  - Communicate with and update the patient/family, physician, and health care team regarding progress on the discharge plan  - Arrange appropriate transportation to post-acute venues  -Patient to be evaluated   Pt anticipated for rehab vs home   Outcome: Progressing

## 2019-01-18 LAB
ALBUMIN SERPL BCP-MCNC: 3.2 G/DL (ref 3.5–5)
ALP SERPL-CCNC: 62 U/L (ref 46–116)
ALT SERPL W P-5'-P-CCNC: 43 U/L (ref 12–78)
ANION GAP SERPL CALCULATED.3IONS-SCNC: 7 MMOL/L (ref 4–13)
AST SERPL W P-5'-P-CCNC: 35 U/L (ref 5–45)
BASOPHILS # BLD AUTO: 0.04 THOUSANDS/ΜL (ref 0–0.1)
BASOPHILS NFR BLD AUTO: 1 % (ref 0–1)
BILIRUB SERPL-MCNC: 0.5 MG/DL (ref 0.2–1)
BUN SERPL-MCNC: 31 MG/DL (ref 5–25)
CALCIUM SERPL-MCNC: 9.1 MG/DL (ref 8.3–10.1)
CHLORIDE SERPL-SCNC: 98 MMOL/L (ref 100–108)
CO2 SERPL-SCNC: 29 MMOL/L (ref 21–32)
CREAT SERPL-MCNC: 1.14 MG/DL (ref 0.6–1.3)
EOSINOPHIL # BLD AUTO: 0.2 THOUSAND/ΜL (ref 0–0.61)
EOSINOPHIL NFR BLD AUTO: 3 % (ref 0–6)
ERYTHROCYTE [DISTWIDTH] IN BLOOD BY AUTOMATED COUNT: 13.2 % (ref 11.6–15.1)
EST. AVERAGE GLUCOSE BLD GHB EST-MCNC: 103 MG/DL
GFR SERPL CREATININE-BSD FRML MDRD: 44 ML/MIN/1.73SQ M
GLUCOSE SERPL-MCNC: 115 MG/DL (ref 65–140)
GLUCOSE SERPL-MCNC: 124 MG/DL (ref 65–140)
GLUCOSE SERPL-MCNC: 85 MG/DL (ref 65–140)
GLUCOSE SERPL-MCNC: 97 MG/DL (ref 65–140)
HBA1C MFR BLD: 5.2 % (ref 4.2–6.3)
HCT VFR BLD AUTO: 34 % (ref 34.8–46.1)
HGB BLD-MCNC: 10.9 G/DL (ref 11.5–15.4)
IMM GRANULOCYTES # BLD AUTO: 0.04 THOUSAND/UL (ref 0–0.2)
IMM GRANULOCYTES NFR BLD AUTO: 1 % (ref 0–2)
LYMPHOCYTES # BLD AUTO: 1.88 THOUSANDS/ΜL (ref 0.6–4.47)
LYMPHOCYTES NFR BLD AUTO: 27 % (ref 14–44)
MAGNESIUM SERPL-MCNC: 2.4 MG/DL (ref 1.6–2.6)
MCH RBC QN AUTO: 32 PG (ref 26.8–34.3)
MCHC RBC AUTO-ENTMCNC: 32.1 G/DL (ref 31.4–37.4)
MCV RBC AUTO: 100 FL (ref 82–98)
MONOCYTES # BLD AUTO: 0.55 THOUSAND/ΜL (ref 0.17–1.22)
MONOCYTES NFR BLD AUTO: 8 % (ref 4–12)
NEUTROPHILS # BLD AUTO: 4.24 THOUSANDS/ΜL (ref 1.85–7.62)
NEUTS SEG NFR BLD AUTO: 60 % (ref 43–75)
NRBC BLD AUTO-RTO: 0 /100 WBCS
PHOSPHATE SERPL-MCNC: 3.2 MG/DL (ref 2.3–4.1)
PLATELET # BLD AUTO: 182 THOUSANDS/UL (ref 149–390)
PMV BLD AUTO: 9.4 FL (ref 8.9–12.7)
POTASSIUM SERPL-SCNC: 3.7 MMOL/L (ref 3.5–5.3)
PROT SERPL-MCNC: 6.7 G/DL (ref 6.4–8.2)
RBC # BLD AUTO: 3.41 MILLION/UL (ref 3.81–5.12)
SODIUM SERPL-SCNC: 134 MMOL/L (ref 136–145)
WBC # BLD AUTO: 6.95 THOUSAND/UL (ref 4.31–10.16)

## 2019-01-18 PROCEDURE — 80053 COMPREHEN METABOLIC PANEL: CPT | Performed by: PHYSICAL MEDICINE & REHABILITATION

## 2019-01-18 PROCEDURE — 99232 SBSQ HOSP IP/OBS MODERATE 35: CPT | Performed by: PHYSICAL MEDICINE & REHABILITATION

## 2019-01-18 PROCEDURE — 97166 OT EVAL MOD COMPLEX 45 MIN: CPT

## 2019-01-18 PROCEDURE — 97535 SELF CARE MNGMENT TRAINING: CPT

## 2019-01-18 PROCEDURE — 97162 PT EVAL MOD COMPLEX 30 MIN: CPT

## 2019-01-18 PROCEDURE — 84100 ASSAY OF PHOSPHORUS: CPT | Performed by: PHYSICAL MEDICINE & REHABILITATION

## 2019-01-18 PROCEDURE — 97110 THERAPEUTIC EXERCISES: CPT

## 2019-01-18 PROCEDURE — 85025 COMPLETE CBC W/AUTO DIFF WBC: CPT | Performed by: PHYSICAL MEDICINE & REHABILITATION

## 2019-01-18 PROCEDURE — 97530 THERAPEUTIC ACTIVITIES: CPT

## 2019-01-18 PROCEDURE — 83735 ASSAY OF MAGNESIUM: CPT | Performed by: PHYSICAL MEDICINE & REHABILITATION

## 2019-01-18 PROCEDURE — 82948 REAGENT STRIP/BLOOD GLUCOSE: CPT

## 2019-01-18 PROCEDURE — 83036 HEMOGLOBIN GLYCOSYLATED A1C: CPT | Performed by: PHYSICAL MEDICINE & REHABILITATION

## 2019-01-18 RX ADMIN — ENOXAPARIN SODIUM 40 MG: 40 INJECTION SUBCUTANEOUS at 08:14

## 2019-01-18 RX ADMIN — LOSARTAN POTASSIUM 100 MG: 50 TABLET, FILM COATED ORAL at 08:15

## 2019-01-18 RX ADMIN — NYSTATIN: 100000 POWDER TOPICAL at 21:04

## 2019-01-18 RX ADMIN — OXYCODONE HYDROCHLORIDE 2.5 MG: 5 TABLET ORAL at 21:06

## 2019-01-18 RX ADMIN — LIDOCAINE 1 PATCH: 50 PATCH CUTANEOUS at 08:14

## 2019-01-18 RX ADMIN — OXYCODONE HYDROCHLORIDE 2.5 MG: 5 TABLET ORAL at 04:17

## 2019-01-18 RX ADMIN — ACETAMINOPHEN 975 MG: 325 TABLET, FILM COATED ORAL at 21:05

## 2019-01-18 RX ADMIN — STANDARDIZED SENNA CONCENTRATE 8.6 MG: 8.6 TABLET ORAL at 21:05

## 2019-01-18 RX ADMIN — LIDOCAINE 1 PATCH: 50 PATCH CUTANEOUS at 08:12

## 2019-01-18 RX ADMIN — METOPROLOL TARTRATE 25 MG: 25 TABLET, FILM COATED ORAL at 08:16

## 2019-01-18 RX ADMIN — ACETAMINOPHEN 975 MG: 325 TABLET, FILM COATED ORAL at 13:25

## 2019-01-18 RX ADMIN — METOPROLOL TARTRATE 25 MG: 25 TABLET, FILM COATED ORAL at 21:07

## 2019-01-18 RX ADMIN — SERTRALINE HYDROCHLORIDE 25 MG: 25 TABLET ORAL at 21:05

## 2019-01-18 RX ADMIN — OXYCODONE HYDROCHLORIDE 2.5 MG: 5 TABLET ORAL at 16:27

## 2019-01-18 RX ADMIN — GABAPENTIN 600 MG: 300 CAPSULE ORAL at 21:05

## 2019-01-18 RX ADMIN — LEVOTHYROXINE SODIUM 112 MCG: 112 TABLET ORAL at 05:51

## 2019-01-18 RX ADMIN — ACETAMINOPHEN 975 MG: 325 TABLET, FILM COATED ORAL at 05:51

## 2019-01-18 RX ADMIN — CALCIUM CARBONATE 500 MG (1,250 MG)-VITAMIN D3 200 UNIT TABLET 1 TABLET: at 08:16

## 2019-01-18 RX ADMIN — OXYCODONE HYDROCHLORIDE 2.5 MG: 5 TABLET ORAL at 08:16

## 2019-01-18 RX ADMIN — FUROSEMIDE 20 MG: 20 TABLET ORAL at 08:15

## 2019-01-18 RX ADMIN — HYDROXYCHLOROQUINE SULFATE 200 MG: 200 TABLET, FILM COATED ORAL at 11:45

## 2019-01-18 RX ADMIN — DOCUSATE SODIUM 100 MG: 100 CAPSULE, LIQUID FILLED ORAL at 08:14

## 2019-01-18 RX ADMIN — CLOPIDOGREL BISULFATE 75 MG: 75 TABLET ORAL at 08:15

## 2019-01-18 RX ADMIN — GABAPENTIN 300 MG: 300 CAPSULE ORAL at 08:16

## 2019-01-18 RX ADMIN — METHOCARBAMOL 500 MG: 500 TABLET, FILM COATED ORAL at 13:26

## 2019-01-18 RX ADMIN — METHOCARBAMOL 500 MG: 500 TABLET, FILM COATED ORAL at 08:14

## 2019-01-18 RX ADMIN — AMLODIPINE BESYLATE 10 MG: 10 TABLET ORAL at 08:16

## 2019-01-18 RX ADMIN — CALCIUM CARBONATE 500 MG (1,250 MG)-VITAMIN D3 200 UNIT TABLET 1 TABLET: at 16:27

## 2019-01-18 RX ADMIN — NYSTATIN 1 APPLICATION: 100000 POWDER TOPICAL at 08:12

## 2019-01-18 NOTE — PROGRESS NOTES
Spoke with patient's daughter Carry Desi regarding patient's personal needs while on the unit  Update given to daughter regarding patient's status also

## 2019-01-18 NOTE — PROGRESS NOTES
19 0700   Patient Data   Rehab Impairment Impairment of mobility, safety, Activities of Daily Living (ADLs), and cognitive/communication skills due to Orthopedic Disorders s/p fall at home   Etiologic Diagnosis Traumatic Type III Odontoid fx, C6 fx   Date of Onset 19   Support System   Name Haven Cap   Relationship DTR   Able to provide 24 hour supervision Yes   Able to provide physical help? Yes   Home Setup   Type of Home Single Level   Method of Entry Ramp;Hand Rail Bilateral   First Floor Bathroom Shower;Curtain;Grab Bars;Built-in shower seat  (handicap s/u for pt's spouse who had PD ())   First Floor Bathroom Accessibility Grab bars by toilet;Grab bars in tub/shower   First Floor Setup Available Yes   Available Equipment Single Point Cane;Roller Walker  (raised toilet)   Baseline Information   Prior Device(s) Used On-Ramp Wireless; Shower Chair   Prior IADL Participation   Money Management Identify Money;Estimate Costs;Estimate Change;Combine Bills;Manage Checkbook  (A from DTR)   Meal Preparation Partial Participation;Stove top;Microwave; Other  (light meal prep, pt DTR A)   Home Cleaning Cleaning service  (1x per week)   Prior Level of Function   Self-Care 3  Independent - Patient completed the activities by him/herself, with or without an assistive device, with no assistance from a helper  Indoor-Mobility (Ambulation) 3  Independent - Patient completed the activities by him/herself, with or without an assistive device, with no assistance from a helper  Functional Cognition 2  Needed Some Help - Patient needed a partial assistance from another person to complete activities  Prior Assistance Needed for Driving;Household Chores/Cleaning;Meal Preparation;Money Management; Shopping   Prior Device Used VELMA Garay   Psychosocial   Psychosocial (WDL) WDL   Patient Behaviors/Mood Anxious; Fearful   Restrictions/Precautions   Precautions Fall Risk;Supervision on toilet/commode;Spinal precautions;Pain  (purewick at night)   Braces or Orthoses C/S Collar  (at all times)   Pain Assessment   Pain Assessment 0-10   Pain Score 7   Pain Type Acute pain   Pain Location Neck   Pain Orientation Bilateral;Posterior   Pain Radiating Towards b/l shoulders   Pain Descriptors Aching;Sore;Tender   Pain Frequency Constant/continuous   Pain Onset Ongoing   Clinical Progression Gradually worsening   Effect of Pain on Daily Activities Pain of 4 at rest, inc to 7 w/ movement   Hospital Pain Intervention(s) Repositioned; Ambulation/increased activity; Rest;Other (Comment)  (RN notified, admin pain meds; C/S collar adjusted)   Response to Interventions Pt tolerated Tx   QI: Eating   Assistance Needed Set-up / clean-up   Assistance Provided by [place] No physical assistance   Eating CARE Score 5   Eating Assessment   Positioning Upright;Out of Bed   Meal Assessed Breakfast   QI: Swallowing/Nutritional Status Regular food   Dentures Lower   Opens Packages No   Findings benefits from straws for liquids 2* C/S collar  Eating (FIM) 5 - Patient needs help to open contianers or set up tray   QI: Oral Hygiene   Assistance Needed Set-up / [address] Provided by [place] No physical assistance   Comment seated w/c level at sink 2* spinal precautions and dec stand tolerance  Oral Hygiene CARE Score 5   Grooming   Able To Initiate Tasks;Comb/Brush Hair;Wash/Dry Face;Brush/Clean Teeth;Wash/Dry Hands   Limitation Noted In Coordination; Safety;Strength;Timeliness   Findings seated, able to manipulate toothpaste/toothbrush materials  Pt requires A to comb hair in back 2* dec ROM of b/l UE      Grooming (FIM) 4 - Patient completed 3/4  tasks   QI: Shower/Bathe Self   Assistance Needed Physical assistance   Assistance Provided by [place] 50%-74%   Shower/Bathe Self CARE Score 2   Bathing   Assessed Bath Style Sponge Bath   Anticipated D/C Bath Style Shower   Able to [place] No   Able to [employer] Temperature No Able to Wash/Rinse/Dry (body part) L Upper Leg;R Upper Leg;Chest;Abdomen;Perineal Area   Limitations Noted in Balance; Endurance;ROM;Safety;Strength;Timeliness   Positioning Seated;Standing   Findings  Pt seated and required A for thoroughness of b/l UE 2* dec ROM/strength and inc pain in neck w/ UE movement  Pt dependent for bathing b/l LE and buttocks 2* dec posterior reach and unable to complete cross leg to maintain spinal precautions  Pt 's pain level, dec ROM, and body habitus limiting factors  Pt able to bathe perineal area in stance w/ RW w/ steadying A from OT  Inc time required t/o 2* fatigue  Bathing (FIM) 3 - Patient completes 5/10  6/10 or 7/10 parts   QI: Upper Body Dressing   Assistance Needed Physical assistance   Assistance Provided by [place] 75% or more   Comment Pt required maxA for problem solving and to thread b/l UE through long sleeve button down shirt, A for mgmt in back and A for buttoning 2* dec visual w/ C/S collar on  Pt noted w/ inc pain in neck w/ arm movement  Pt unable to bring shirt overhead, OT recommend wide/stretchy openings for pullover shirts  Pt has poor tolerance for C/S collar adjustment 2* c/o severe pain in posterior neck  Upper Body Dressing CARE Score 2   QI: Lower Body Dressing   Assistance Needed Physical assistance   Assistance Provided by [place] 50%-74%   Comment Pt requires A to thread pants/undergarment over b/l LE  Pt steadying A and inc time to complete CM over hips      Lower Body Dressing CARE Score 2   QI: Putting On/Taking Off Footwear   Assistance Needed Physical assistance   Assistance Provided by [place] Total assistance   Comment dependent 2* spinal precautions   Putting On/Taking Off Footwear CARE Score 1   QI: Picking Up Object   Reason if not Attempted Safety concerns   Picking Up Object CARE Score 88   Dressing/Undressing Clothing   Remove UB Clothes Other  (hospital gown)   Remove LB [address] Pants;Undergarment;Socks   Limitations Noted In Balance; Buttoning; Endurance;Problem Solving; Safety;Strength;ROM; Timeliness   Positioning Supported Sit;Standing   UB Dressing (FIM) 2 - Patient completes 25-49% of all tasks   LB Dressing (FIM) 2 - Patient completes 25-49% of all tasks   QI: [address] Needed Physical assistance   Assistance Provided by [place] 25%-49%   Henrry Chen [address] Score 3   Toileting   Able to [address] down yes, up yes  Manage Hygiene Bladder   Limitations Noted In Balance;ROM;Safety;LE Strength;UE Strength   Adaptive Equipment Grab Bar   Findings Pt steadying A in stance for perineal hygiene, pt state "I can't do it", however demo ability to complete w/ inc time  Pt does demo impaired posterior reach and will require extensive A for bowel hygiene  Pt reports utilizing "long wash cloth" at home for buttock cleaning/hygiene 2* body habitus  Toileting (FIM) 4 - Patient requires steadying assist or light touching   Bed Mobility   Findings Pt refusing to roll and reports inc pain when attempting log rolling  QI: Lying to Sitting on Side of Bed   Assistance Needed Physical assistance   Assistance Provided by [place] 50%-74%   Comment Pt require A for trunk mgmt and b/l LE, 2nd person present for safety and 2* pt pain level  Lying to Sitting on Side of Bed CARE Score 2   QI: Sit to Stand   Assistance Needed Physical assistance   Assistance Provided by [place] 25%-49%   Comment w/ RW   Sit to Stand CARE Score 3   QI: Chair/Bed-to-Chair Transfer   Assistance Needed Physical assistance   Assistance Provided by [place] 25%-49%   Comment Deondre w/ RW, max inc time   Chair/Bed-to-Chair Transfer CARE Score 3   Transfer Bed/Chair/Wheelchair   Positioning Concerns (pain)   Limitations Noted In Balance;Confidence; Endurance;Pain Management;UE Strength;LE Strength   Adaptive Equipment Roller Walker   Stand Pivot Minimal Assist   Sit to Stand Minimal   Stand to Sit Minimal Supine to Sit Moderate Assist   Findings VC for safe hand placement, inc time warranted, pt noted anxious and anticipating pain w/ sit>stand, requires max encouragment from OT  Bed, Chair, Wheelchair Transfer (FIM) 4 - Patient requires steadying assist or light touching   QI: Toilet Transfer   Assistance Needed Physical assistance   Assistance Provided by [place] 25%-49%   Toilet Transfer CARE Score 3   Toilet Transfer   Surface Assessed Standard Toilet   Transfer Technique Standard   Limitations Noted In Balance; Endurance;Confidence; Safety;ROM;LE Strength;UE Strength   Adaptive Equipment Grab Bar   Findings Deondre w/ RW and VC for safety   Toilet Transfer (FIM) 4 - Patient requires steadying assist or light touching   Tub/Shower Transfer   Not Assessed Sponge Bath;Endurance; Safety   Findings Pt requesting SB this session 2* fatigue/pain when OT offered full shower  Shower Transfer (FIM) 0 - Activity does not occur   Comprehension   QI: Comprehension 3  Usually Understands: Understands most conversations, but misses some part/intent of message  Requires cues at times to understand  Comprehension (FIM) 4 - Understands basic info/conversation 75-90% of time   Expression   QI: Expression 4   Express complex messages without difficulty and with speech that is clear and easy to [place]   Expression (FIM) 5 - Needs help/cues only RARELY (< 10% of the time)   Social Interaction   Social Interaction (FIM) 5 - Interacts appropriately with others 90% of time   Problem Solving   Problem solving (FIM) 4 - Solves basic problems 75-89% of time   Memory   Memory (FIM) 5 - Needs cueing reminders <10%   RUE Assessment   RUE Assessment WFL  (limited to 90 degrees 2* pain)   LUE Assessment   LUE Assessment WFL  (limited to 90 degrees 2* pain)   Coordination   Movements are Fluid and Coordinated 0   Sensation   Light Touch No apparent deficits   Additional Comments reports "some tingling" in b/l hands, however sensation appears intact  Cognition   Overall Cognitive Status Impaired   Arousal/Participation Alert; Cooperative   Attention Attends with cues to redirect   Orientation Level Oriented X4   Memory Decreased recall of precautions   Following Commands Follows one step commands with increased time or repetition   Comments Pt unable to recall spinal precautions  Pt demo G recall of fall at home and able to describe event to OT  Discharge Information   Patient's Discharge Plan "return home w/ my daughter"   Patient's Rehab Expectations "to get back to what I was doing before, maybe even better"   Barriers to Discharge Home Decreased Strength;Decreased Endurance;Pain; Safety Considerations   Impressions Pt is a 80 y o  female seen for OT evaluation s/p admit to [facility] on 1/9/2019 for type III odontoid fx and non-displaced C6 fx s/p fall at home  Pt comorbidities include: h/o HTN, b/l LE edema, neuropathy, anemia, CVA on plavix, DM, dperession, AVR, L TKA, arthritis, h/o broken back (per pt report T2 and L5 fx), and h/o 2 CVAs 2* high BP  Pt reports living w/ DTR in single level home w/ ramp to eter and indep for ADLs w/ assist from DTR for IADLs PTA  Pt using RW or SPC at times PTA  Pt reports having the following DME available at home: RW, built in shower chair/handicap bathroom, GB in shower and at toilet, raised toilet seat, and SPC  Upon evaluation, pt current level of fxn: maxA for UB/LB dressing, modA for bathing, modA bed mobility, and Deondre w/ RW for fxnl transfers due to the following deficits impacting occupational performance: orthopedic restrictions, pain mgmt, poor endurance, dec UE/LE strength, dec ROM, dec stand tolerance, and impaired standing balance  Based on OT evaluation, pt demonstrates good rehab potential and would benefit from skilled OT services to reach pt goals of S w/ RW for ADL fxn/transfers and Deondre for IADLs w/ ELOS 2 weeks to return home w/ family support      OT Therapy Minutes   OT Time In 0700   OT Time Out 0830   OT Total Time (minutes) 90   OT Mode of treatment - Individual (minutes) 90   OT Mode of treatment - Concurrent (minutes) 0   OT Mode of treatment - Group (minutes) 0   OT Mode of treatment - Co-treat (minutes) 0   OT Mode of Teatment - Total time(minutes) 90 minutes

## 2019-01-18 NOTE — PROGRESS NOTES
OT LTG   01/18/19 0700   Rehab Team Goals   ADL Team Goal Patient will require supervision with ADLs with least restrictive device upon completion of rehab program   Transfer Team Goal Patient will require supervision with transfers with least restrictive device upon completion of rehab program   Cognitive Team Goal Patient will be independent for basic  tasks and require supervision for complex tasks upon completion of rehab program   Rehab Team Interventions   OT Interventions Self Care;Home Management; Therapeutic Exercise;Community Reintegration;Cognitive Retraining;Energy Conservation;Splint Fabrication/Modification;Patient/Family Education   Eating Goal   QI: Eating Goal 06  Independent - Patient completes the activity by him/herself with no assistance from a helper  FIM Eating Goal Moderate [place]   Status Ongoing; Target goal - one week; Target goal - two weeks   Interventions Optimal Position   Grooming Goal   QI: Oral Hygiene Goal 06  Independent - Patient completes the activity by him/herself with no assistance from a helper  FIM Grooming Goal Moderate [place]   Task Wash/Dry Face;Wash/Dry Hands;Brush Teeth;Comb Hair;Acquire Items; Initiate Task;Complete Groom   Environment Seated in Chair   Status Ongoing; Target goal - one week; Target goal - two weeks   Intervention Assistive Device;Balance Work; Therapeutic Exercise; Tolerance Work   Bathing Goal   QI: Shower/bathe self Goal 04  Supervision or touching assistance- [place] provides VERBAL CUES or supervision throughout activity  FIM Bathing Goal Supervision   Task Upper Body Bathing  (LB)   Environment Seated;Standing; Shower   Adaptive Equipment Reacher;Long Handle Sponge;Seat with back;Grab Bar;Hand Held Shower   Status Ongoing; Target goal - one week; Target goal - two weeks   Intervention ADL Training;Assistive Device; Therapeutic Exercise   Upper Body Dressing Goal   QI: Upper body dressing Goal 05   Setup or clean-up assistance - [place] SETS UP or CLEANS UP, patient completes activity  [place] assists only prior to or following the activity  FIM Upper Body Dressing Goal Supervision   Task Upper Body;Arms in/out; Over Head;Button Down   Environment Seated   Status Ongoing; Target goal - one week; Target goal - two weeks   Intervention Assistive Device;Balance Work; Therapeutic Exercise; Tolerance Work   Lower [place] [employer] Dressing Goal   QI: Lower body dressing Goal 05  Setup or clean-up assistance - [place] SETS UP or CLEANS UP, patient completes activity  [place] assists only prior to or following the activity  QI: Putting on/taking off footwear Goal 05  Setup or clean-up assistance - [place] SETS UP or CLEANS UP, patient completes activity  [place] assists only prior to or following the activity  FIM Lower Body Dressing Goal Supervision   Task Lower Body;Socks;Pants; Undergarment;Shoe/Slipper   Adaptive Equipment Reacher;Sock Aide; Shoe Horn;Dressing Stick; Elastic Laces   Environment Seated;Standing   Status Ongoing; Target goal - one week; Target goal - two weeks   Intervention Assistive Device;Balance Work; Therapeutic Exercise; Tolerance Work   Toileting Goal   QI: Toileting hygiene Goal 05  Setup or clean-up assistance - [place] SETS UP or CLEANS UP, patient completes activity  [place] assists only prior to or following the activity  FIM Toileting Goal Supervision   Task Pants Up;Pants Down;Hygiene   Status Ongoing; Target goal - one week; Target goal - two weeks   Intervention ADL Training;Balance Work;Assistive Device   PT Transfer Goal   QI: Sit to stand Goal 04  Supervision or touching assistance- [place] provides VERBAL CUES or supervision throughout activity  QI: Chair/bed-to-chair transfer Goal 04  Supervision or touching assistance- [place] provides VERBAL CUES or supervision throughout activity  Assistive Device Roller Walker   Environment Level Surface; Well Lit   Status Ongoing; Target goal - one week; Target goal - two weeks   Toileting Transfer Goal   QI: Toilet transfer Goal 04  Supervision or touching assistance- [place] provides VERBAL CUES or supervision throughout activity  FIM Toilet Transfer Goal Supervision   Assistive Device Roller Walker   Status Ongoing; Target goal - one week; Target goal - two weeks   Intervention ADL Training;Balance Work;Assistive Device   Tub/Shower Transfer Goal   FIM Tub/ Shower Transfer Goal Supervision   Method Shower Stall   Assist Device Seat with Back;Grab Bar;Hand Held Shower   Status Ongoing; Target goal - one week; Target goal - two weeks   Interventions ADL Training;Assistive Device   Comprehension Goal   Comprehension Assist Level Close Supervision   Status Ongoing; Target goal - one week; Target goal - two weeks   Interventions Receptive Language Tasks; Reading Tasks   Expression Goal   Expression Assist Level Moderate [place]   Status Ongoing; Target goal - one week; Target goal - two weeks   Social Interaction Goal   Social Interaction Assist Level Moderate [place]   Status Ongoing; Target goal - one week; Target goal - two weeks   Problem Solving Goal   Problem Solving Assist Level Close Supervision   Status Ongoing; Target goal - one week; Target goal - two weeks   Intervention Cognitive Training; Safety Education   Memory Goal   Memory Assist Level Close Supervision   Status Ongoing; Target goal - one week; Target goal - two weeks   Intervention Precautions Review   Object Retrieval Goal   QI: Picking up object Goal 04  Supervision or touching assistance- [place] provides VERBAL CUES or supervision throughout activity  Assistive Device  Reacher   Small Object Picked Up shoe   Home Management Goal   Assist Level Supervision   Status Ongoing; Target - one week; Target - two weeks   Interventions Activity Tolerance; Other;Money Management  (light meal prep)

## 2019-01-18 NOTE — TREATMENT PLAN
Individualized Plan of [address] 80 y o  female MRN: [id number]  Unit/Bed#: -01 Encounter: [id number]     PATIENT INFORMATION  ADMISSION DATE: 1/17/2019  4:48 PM APOORVA CATEGORY:Orthopedic Disorders:  08 9  Other Orthopedic Type 3 odontoid fracture, C6 fracture   ADMISSION DIAGNOSIS: Fracture [T14  8XXA]  EXPECTED LOS: 7 to 10 days     MEDICAL/FUNCTIONAL PROGNOSIS  Based on my assessment of the patient's medical conditions and current functional status, the prognosis for attaining medical and functional goals or the IRF stay is:  Good    Medical Goals: Patient will be medically stable for discharge to [facility] upon completion of rehab program    Cardiopulmonary function: Ensure cardiopulmonary stability and optimize cardiopulmonary function not only at rest but with activity as patient's activity level significantly increases in acute rehab compared with prior to transfer in preparation for safe discharge from [facility]  Must closely and frequently monitor blood pressure and HR to ensure adequate cardiac output during ADLs and ambulation as patient is at increased risk for orthostatic hypotension/syncope and potential injury if not monitored for and managed adequately  Blood pressure management:    Frequent monitoring of blood pressure with appropriate adjustments in blood pressure medication management to optimize blood pressure control and prevent/limit renal complications  Monitoring impact of blood pressure and side-effects of blood pressure medications at rest and with activity  Hypoxia prevention: Ensure appropriate level of oxygenation at rest and with activity to avoid symptomatic hypoxia, maximize functional performance, and decrease risk of atelectasis/pneumonia through close and frequent monitoring, providing appropriate respiratory treatments (such as incentive spirometry), and when necessary provide/adjust respiratory medications     Pain management:  Pain will improve with frequent evaluation of pain, careful adjustments in medications, frequent re-evaluation of patient's pain and medical/neurologic status to ensure optimal pain control, avoidance of potential serious and even life-threatening side-effects and drug interactions, as well as weaning pain medications as soon as possible to decrease risk of short and long-term use  Also with involvement of neuropsychology for adjunct coping mechanisms  Skin wounds: Appropriate skin checks for wound/skin evaluation including evaluation of healing, worsening of wounds, or signs of infection  Wound care management from rehab nursing, wound care nursing, physicians  Ensure frequent appropriate turning, positioning in bed, in chair, when mobilizing, and when appropriate with use of appropriate devices to optimize healing and decrease risk of worsening or new skin breakdown  Prevention of hypo/hyperglycemia:  Given history of diabetes - maintain euglycemia    ANTICIPATED DISCHARGE DISPOSITION AND SERVICES  COMMUNITY SETTING: Home - with supervision  Is a 24-hr caregiver available? Yes  Has discharge plan been discussed with primary caregiver? Yes  Date of Discussion: unknown date    ANTICIPATED FOLLOW-UP SERVICE:   Outpatient Therapy Services: PT and OT        DISCIPLINE SPECIFIC PLANS:  Required Disciplines & Services: Rehabillitation Nursing, Case Management, Diabetes Education and Psychology    REQUIRED THERAPY:  Therapy Hours per Day Days per Week Total Days   Physical Therapy 1 5 5 5   Occupational Therapy 1 5 5 5   NOTE: Additional therapy time(s) may be added as appropriate to meet patient needs and to achieve functional goals      Patient will either participate in above therapy regimen or participate in 900 minutes of therapy within 7 day week consisting of PT and OT due to the following medical procedure/condition:Orthopedic Disorders:  08 9  Other Orthopedic Type 3 odontoid fracture and C6 fracture    ANTICIPATED FUNCTIONAL OUTCOMES:  ADL:   Patient will get to at least a Supervision /Standby assist level with ADLs   Bladder/Bowel: Patient will return to premorbid level for bladder/bowel management upon completion of rehab program   Transfers:   Patient will get to a modified independent level with transfers   Locomotion:   Patient will get to a modified independent level with locomotion   Cognitive:   Patient will be at baseline cognitive function at discharge        DISCHARGE PLANNING NEEDS  Equipment needs: Discharge needs to be reviewed with team      REHAB ANTICIPATED PARTICIPATION RESTRICTIONS:  None

## 2019-01-18 NOTE — PLAN OF CARE
DISCHARGE PLANNING     Discharge to home or other facility with appropriate resources Progressing        INFECTION - ADULT     Absence or prevention of progression during hospitalization Progressing     Absence of fever/infection during neutropenic period Progressing        Nutrition/Hydration-ADULT     Nutrient/Hydration intake appropriate for improving, restoring or maintaining nutritional needs Progressing        PAIN - ADULT     Verbalizes/displays adequate comfort level or baseline comfort level Progressing        Potential for Falls     Patient will remain free of falls Progressing        Prexisting or High Potential for Compromised Skin Integrity     Skin integrity is maintained or improved Progressing        SAFETY ADULT     Patient will remain free of falls Progressing     Maintain or return to baseline ADL function Progressing     Maintain or return mobility status to optimal level Progressing

## 2019-01-18 NOTE — PROGRESS NOTES
01/18/19 1001   Patient Data   Rehab Impairment Orthopedic Injury   Etiologic Diagnosis Type III Odontoid fx, C6 fx   Date of Onset 01/09/19   Support System   Name joshua   Birdie dtr    Home Setup   Type of Home Single Level   Method of Entry Ramp   Number of Stairs in Home 1  (curb step may use RW)   Prior Level of Function   Self-Care 3  Independent - Patient completed the activities by him/herself, with or without an assistive device, with no assistance from a helper  Indoor-Mobility (Ambulation) 3  Independent - Patient completed the activities by him/herself, with or without an assistive device, with no assistance from a helper  Stairs 3  Independent - Patient completed the activities by him/herself, with or without an assistive device, with no assistance from a helper  Functional Cognition 2  Needed Some Help - Patient needed a partial assistance from another person to complete activities  Prior Device Used (2 Canes )   Psychosocial   Psychosocial (WDL) WDL   Restrictions/Precautions   Precautions Fall Risk;Spinal precautions;Pain   Braces or Orthoses C/S Collar   Pain Assessment   Pain Score 7   Pain Location Neck   Pain Orientation Bilateral   Hospital Pain Intervention(s) Repositioned; Ambulation/increased activity   Response to Interventions nursing had provided pain meds earlier in the day    QI: Elizabethrt Score 4   Toileting   Able to [address] down yes, up yes     Manage Hygiene Bladder   Limitations Noted In Balance;UE Strength;LE Strength   Toileting (FIM) 4 - Patient completes 75% of all tasks   QI: Roll Left and Right   Assistance Needed Physical assistance   Assistance Provided by [place] 50%-74%   Roll Left and Right CARE Score 2   QI: Sit to Lying   Assistance Needed Physical assistance   Assistance Provided by [place] 75% or more   Sit to Lying CARE Score 2   QI: Lying to Sitting on Side of Bed   Assistance Needed Physical assistance   Assistance Provided by [place] 75% or more   Lying to Sitting on Side of Bed CARE Score 2   QI: Sit to Stand   Assistance Needed Physical assistance   Assistance Provided by [place] 50%-74%   Sit to Stand CARE Score 2   QI: Chair/Bed-to-Chair Transfer   Assistance Needed Physical assistance   Assistance Provided by [place] 50%-74%   Chair/Bed-to-Chair Transfer CARE Score 2   QI: Car Transfer   Reason if not Attempted Safety concerns   Car Transfer CARE Score 88   Transfer Bed/Chair/Wheelchair   Limitations Noted In Balance; Endurance;UE Strength;LE Strength;Pain Management   Adaptive Equipment Roller Walker   Stand Pivot Moderate Assist   Sit to Stand Moderate Assist   Stand to Sit Minimal   Supine to Sit Maximum Assist   Sit to Supine Maximum Assist   Bed, Chair, Wheelchair Transfer (FIM) 2 - [place] needs to lift or boost to rise AND assist to sit   QI: Toilet Transfer   Assistance Needed Physical assistance; Adaptive equipment   Assistance Provided by [place] 50%-74%   Toilet Transfer CARE Score 2   Toilet Transfer   Surface Assessed Standard Toilet   Transfer Technique Stand Pivot   Limitations Noted In Balance; Endurance;UE Strength;LE Strength   Adaptive Equipment Grab Bar   Findings Mark down and ModA up   Toilet Transfer (FIM) 2 - [place] needs to lift or boost to rise AND assist to sit   QI: Walk 10 Feet   Assistance Needed Physical assistance; Adaptive equipment   Assistance Provided by [place] Less than 25%   Walk 10 Feet CARE Score 3   QI: Walk 50 Feet with Two Turns   Assistance Needed Physical assistance; Adaptive equipment   Assistance Provided by [place] Less than 25%   Walk 50 Feet with Two Turns CARE Score 3   QI: Walk 150 Feet   Reason if not Attempted Safety concerns   Walk 150 Feet CARE Score 88   QI: Walking 10 Feet on Uneven Surfaces   Reason if not Attempted Safety concerns   Walking 10 Feet on Uneven Surfaces CARE Score 88   Ambulation   Does the patient walk? 2   Yes   Primary Discharge Mode of Locomotion Walk   Walk Assist Level Minimum Assist   Gait Pattern Inconsistant Jenny; Slow Jenny;Decreased foot clearance; Step to; Step through; Decreased R stance;Decreased L stance; Improper weight shift   Assist Device Lisa Fang Walked (feet) 20 ft  (25, 30, 50)   Limitations Noted In Balance; Endurance; Heel Strike;Speed;Strength;Swing   Findings very slow gait speed  pausing at times to take rest breaks due to neck pain  elevated height of RW to prevent her from leaning forward   Walking (FIM) 1 - Patient ambulates less than 49 feet regardless of assist/device/set up   QI: 1 Step (Curb)   Reason if not Attempted Safety concerns   1 Step (Curb) CARE Score 88   QI: 4 Steps   Reason if not Attempted Safety concerns   4 Steps CARE Score 88   QI: 12 Steps   Reason if not Attempted Safety concerns   12 Steps CARE Score 88   Comprehension   QI: Comprehension 3  Usually Understands: Understands most conversations, but misses some part/intent of message  Requires cues at times to understand  Comprehension (FIM) 5 - Understands basic directions and conversation   Expression   QI: Expression 3  Exhibits some difficulty with expressing needs and ideas (e g , some words or finishing thoughts) or speech is not clear   Expression (FIM) 5 - Needs help/cues only RARELY (< 10% of the time)   Social Interaction   Social Interaction (FIM) 6 - Interacts appropriately with others BUT requires extra  time   Problem Solving   Problem solving (FIM) 5 - Solves basic problems 90% of time   Memory   Memory (FIM) 5 - Post cues patient   RLE Assessment   RLE Assessment (gross strength 3-/5)   Strength RLE   RLE Overall Strength 3-/5   Strength LLE   LLE Overall Strength 3-/5   Cognition   Arousal/Participation Cooperative   Objective Measure   PT Measure(s) seated hillary BRADLEY   talked with pt and daughter regarding waht she was doing before, recommendations for home, need for fall prevention techniques to be in place  Daughter rpeorted they have 3 dogs at home and one is very energetic so she is working on a plan for safety in regards to the dogs  She is also working on having someonehome with pt on the day she works outside of the house  She also has a commode at home she will set up for pt to use at night  Reviewed fall risk precautions and safety concerns  Discharge Information   Patient's Discharge Plan to [place] home wtih family   Patient's Rehab Expectations to get better   Impressions Pt presents s/p fall at home and a Type 3 odontoid fracture and C6 fx  Pt presents with inc in neck pain, dec BLE strength, dec core strength, dec standing dynamic balance, dec righting reactions and very slow gait speed  Pt is of short stature, so with dec ability to use arms to help per reposition in the chair secondary to neck pain, she also needs physical A for positioning purposes  Pt's family is supportive but will need FT to review safety precautions with pt before d/c home  Pt will benefit from skilled PT to progress BLE strength to assist in standing and progress to stairs, to work on activity tolerance to inc gait speed and to improve balance to dec fall risk      PT Therapy Minutes   PT Time In 1000   PT Time Out 1130   PT Total Time (minutes) 90   PT Mode of treatment - Individual (minutes) 90   PT Mode of treatment - Concurrent (minutes) 0   PT Mode of treatment - Group (minutes) 0   PT Mode of treatment - Co-treat (minutes) 0   PT Mode of Teatment - Total time(minutes) 90 minutes

## 2019-01-18 NOTE — CONSULTS
Consultation - Gloria Lockhart 80 y o  female MRN: [id number]    Unit/Bed#: -01 Encounter: [id number]        History of Present Illness     HPI: Gloria Lockhart is a 80y o  year old female with a history of DM, CVA 2005 (Plavix)/TIA 2016, Rheumatoid arthritis/SLE, HTN, tissue AVR 0533, diastolic CHF, chronic pain taking Hydrocodone, CKD, depression, breast cancer, fibromyalgia, right RA stent for DONAVAN, hypothyroidism, intolerance to statins who presented after a fall at home  As a result of her fall, she sustained a Type III odontoid fracture wit the fracture line extending to the right and left superior articular facets and left transverse foramen of C2 along with an acute nondisplaced fracture through bulky, bridging osteophytes at the C6 level  CTA of the neck showed no aneurysm/dissection  She was seen by Neurosurgery but did not require surgery  She is wearing a cervical collar  She had some delirium but has resolved  Currently, patient has no c/o CP, SOB, dizziness, N/V/D     ROS:  As in HPI, otherwise negative 12 point ROS        Historical Information   Past Medical History:   Diagnosis Date    CHF [facility]congestive heart failure) ([facility] [address] )     Hypertension     Stroke [facility])      History reviewed  No pertinent surgical history  Social History   History   Alcohol Use No     History   Drug use: Unknown     History   Smoking Status    Unknown If Ever Smoked   Smokeless Tobacco    Never Used     History reviewed  No pertinent family history      Meds/Allergies   current meds:  Current Facility-Administered Medications   Medication Dose Route Frequency    acetaminophen (TYLENOL) tablet 975 mg  975 mg Oral Q8H [address]    albuterol (PROVENTIL HFA,VENTOLIN HFA) inhaler 2 puff  2 puff Inhalation Q4H PRN    amLODIPine (NORVASC) tablet 10 mg  10 mg Oral Daily    calcium carbonate-vitamin D (OSCAL-D) 500 mg-200 units per tablet 1 tablet  1 tablet Oral BID With Meals    clopidogrel (PLAVIX) tablet 75 mg  75 mg Oral Daily    docusate sodium (COLACE) capsule 100 mg  100 mg Oral BID    enoxaparin (LOVENOX) subcutaneous injection 40 mg  40 mg Subcutaneous Q24H KARINE    fish oil capsule 1,000 mg  1,000 mg Oral Daily    furosemide (LASIX) tablet 20 mg  20 mg Oral Daily    gabapentin (NEURONTIN) capsule 300 mg  300 mg Oral Daily    gabapentin (NEURONTIN) capsule 600 mg  600 mg Oral HS    hydrALAZINE (APRESOLINE) injection 5 mg  5 mg Intravenous Q6H PRN    hydroxychloroquine (PLAQUENIL) tablet 200 mg  200 mg Oral Daily With Breakfast    insulin lispro (HumaLOG) 100 units/mL subcutaneous injection 1-5 Units  1-5 Units Subcutaneous TID AC    insulin lispro (HumaLOG) 100 units/mL subcutaneous injection 1-5 Units  1-5 Units Subcutaneous HS    levothyroxine tablet 112 mcg  112 mcg Oral Early Morning    lidocaine (LIDODERM) 5 % patch 1 patch  1 patch Topical Daily    lidocaine (LIDODERM) 5 % patch 1 patch  1 patch Topical Daily    losartan (COZAAR) tablet 100 mg  100 mg Oral Daily    methocarbamol (ROBAXIN) tablet 500 mg  500 mg Oral Q6H PRN    metoprolol tartrate (LOPRESSOR) tablet 25 mg  25 mg Oral Q12H KARINE    nystatin (MYCOSTATIN) cream   Topical BID    nystatin (MYCOSTATIN) powder   Topical BID    ondansetron (ZOFRAN) injection 4 mg  4 mg Intravenous Q4H PRN    oxyCODONE (ROXICODONE) IR tablet 2 5 mg  2 5 mg Oral Q4H PRN    oxyCODONE (ROXICODONE) IR tablet 2 5 mg  2 5 mg Oral Q4H PRN    polyethylene glycol (MIRALAX) packet 17 g  17 g Oral Daily PRN    senna (SENOKOT) tablet 8 6 mg  1 tablet Oral HS    sertraline (ZOLOFT) tablet 25 mg  25 mg Oral Daily       PTA meds:   Prescriptions Prior to Admission   Medication    clopidogrel (PLAVIX) 75 mg tablet    furosemide (LASIX) 20 mg tablet    gabapentin (NEURONTIN) 300 mg capsule    hydroxychloroquine (PLAQUENIL) 200 mg tablet    Levothyroxine Sodium 112 MCG CAPS    lidocaine (LIDODERM) 5 %    losartan (COZAAR) 100 MG tablet    LOSARTAN POTASSIUM-HCTZ PO    metoprolol tartrate (LOPRESSOR) 25 mg tablet    sertraline (ZOLOFT) 25 mg tablet    albuterol (PROVENTIL HFA,VENTOLIN HFA) 90 mcg/act inhaler    amLODIPine (NORVASC) 5 mg tablet    calcium carbonate-vitamin D (OSCAL-D) 500 mg-200 units per tablet    calcium-vitamin D 250-100 MG-UNIT per tablet    HYDROcodone-acetaminophen (NORCO) 5-325 mg per tablet    LORazepam (ATIVAN) 0 5 mg tablet    Multiple Vitamins-Calcium (ONE-A-DAY WOMENS PO)    nitroglycerin (NITROSTAT) 0 4 mg SL tablet    Omega-3 Fatty Acids (FISH OIL) 1,000 mg    potassium chloride (K-DUR,KLOR-CON) 10 mEq tablet    Red Yeast Rice 600 MG CAPS     Allergies   Allergen Reactions    Duloxetine Hcl      Cymbalta; Hemorrhagic stroke listed as reaction    Anastrozole     Exemestane      Aromasin; Muscle pain & cramps    Lexapro [Escitalopram]      Urinary retention    Lyrica [Pregabalin]      hypertension    Metformin      diarrhea    Statins      Muscle pain & cramps    Tramadol      hypertension    Triprolidine-Pseudoephedrine        Objective   Vitals: Blood pressure 150/90, pulse 56, temperature 98 °F (36 7 °C), temperature source Oral, resp  rate 18, height 4' 11" (1 499 m), weight 84 1 kg (185 lb 6 5 oz), SpO2 95 %      Physical Exam      Constitutional:  NAD; pleasant; nontoxic  HEENT:  AT/NC;  oropharynx negative for thrush  Neck: collar on  CV:  +S1, S2;  RRR; no rub/murmur  Pulmonary:  BBS without crackles/wheeze/rhonci; resp are unlabored  Abdominal:  soft, +BS, ND/NT; no mass  Musculoskeletal:  Mild bilateral LE edema  :  no gomez  Neurological/Psych:  AAO; no depression/anxiety    Lab Results:   Results from last 7 days  Lab Units 01/18/19  0550   WBC Thousand/uL 6 95   HEMOGLOBIN g/dL 10 9*   HEMATOCRIT % 34 0*   PLATELETS Thousands/uL 182       Results from last 7 days  Lab Units 01/18/19  0550 01/12/19  0525   SODIUM mmol/L 134* 134*   POTASSIUM mmol/L 3 7 3 6   CHLORIDE mmol/L 98* 97*   CO2 mmol/L 29 30   BUN mg/dL 31* 20 CREATININE mg/dL 1 14 0 98   CALCIUM mg/dL 9 1 9 3       Results from last 7 days  Lab Units 01/18/19  0550   HEMOGLOBIN A1C % 5 2             Results from last 7 days  Lab Units 01/18/19  1118 01/18/19  0648 01/17/19  2104   POC GLUCOSE mg/dl 115 97 178*     [unfilled]    Labs reviewed    Imaging: reviewed  EKG, Pathology, and Other Studies: I have personally reviewed pertinent reports  VTE Prophylaxis: Enoxaparin (Lovenox)    Code Status: Level 1 - Full Code   Advance Directive and Living Will: Yes    Power of : Yes    Assessment/Plan      Type III odontoid fracture with fracture line extending to the right/left superior articular facets and left transverse foramen of C2; nondisplaced fracture through bulky, bridging osteophytes at the C6 level:  required no surgery  Continue collar    HTN: on Norvasc 10mg qd, Cozaar 100mg qd, Lopressor 25mg q12hrs; BPs are still a bit too high but cant go up on Lopressor since HR is in the 50's; may need to add Hydralazine to regimen but will watch for today    CKD; baseline 1 1-1 3:  Stable    Hx bioAVR 2012: had no significant disease when had card cath before surgery    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 2016:  continue Plavix that she has been on since CVA 2005; with her TIA 2016, neuro kept Plavix; she is intol statins    DM diet controlled:  stop Accuchecks and watch fastings; not sure she follows a diet at home but had been on Metformin in past but stopped since HbA1C was 5 as OP    Chronic diastolic CHF, LVEF 62%, mild-mod MR/mild TR and norm function AVR 2016:  continue Lasix as at home  Follows with Dr Leno Silverio    Family says LE edema today is less than at home    RA/SLE:  continue Plaquenil    HLD: intol to statin; continue Red yeast rice and Fish oil when she goes home    Hypothyroidism:  continue current Levothyroxine    Hx right RA stent: BPs since stent controlled    Depression: Zoloft    Chronic pain/fibromyalgia:  on Hydrocodone at home    Hx breast cancer: follows with Ramya Perez      Counseling / Coordination of Care  Total floor / unit time spent today 60 minutes  Greater than 50% of total time was spent with the patient and / or family counseling and / or coordination of care  ** Please Note: Dragon 360 Dictation voice to text software may have been used in the creation of this document   **

## 2019-01-18 NOTE — PROGRESS NOTES
Physical Medicine and Rehabilitation Progress Note  Dorothy Rajan 80 y o  female MRN: [id number]  Unit/Bed#: -01 Encounter: [id number]    HPI: Prema Son a 80 y  o  female who presented to the [facility] as a trauma following a fall on Plavix  She utilizes a SPC at baseline, and had hit a wet patch on the floor causing the cane to slip from under her  Her PMH is significant for HTN with history of CVA, AVR, HTN, and T2DM  CT C-Spine revealed a Type 3 Odontoid fracture extending to the R and L superior articular facets and L transverse foramen  She also had an acute non-displaced C6 fracture  Treated non-op  Course c/b by fluid overload, hospital delirium, and  Pain  These are improving  She was evaluated by PT/OT and determined to be appropriate for discharge to the [facility] on 1/17/19  Chief Complaint: Neck Pain    Interval: Patient s/e at bedside  States she had a heavenly sleep  She denies any CP, SOB, fevers, chills, N/V  Last BM 1/16  She reports she used to see a pain management physician in the early 80s who taught her biofeedback and did aquatherapy which was incredibly helpful for her diffuse pain  She would like to get back to trying modalities like that to help control her pain  No acute events overnight  ROS:  Negative except for what is noted in HPI/CC/Sbuj    Assessment/Plan:      * Ambulatory dysfunction   Assessment & Plan    Patient will participate in a comprehensive inpatient rehab program with 3-5 hours a day 5-7 days a week of PT/OT  To evaluate for any further SLP needs  - Continue pain management  - Fall precautions     C6 cervical fracture (HCC)   Assessment & Plan    Non-op management  - Pain control as noted above in Type III odontoid fracture  - PT/OT     Type III fracture of odontoid process [facility])   Assessment & Plan    Please see ambulatory dysfunction     C-Collar ATC   - Pain management - goal to wean off of opioids   - Continue Oxycodone 2 5mg as needed and with breakthrough  - Continue Tylenol ATC  - Continue Robaxin as needed for muscle spasms  - Lidocaine patch  - Cervical precautions  - Will need follow-up with Neurosurgery on 1/28/19  History of aortic valve replacement with bioprosthetic valve   Assessment & Plan    Monitor cardiopulmonary status  IM to manage     Fibromyalgia   Assessment & Plan    Continue sertraline  Continue gabapentin (Renally dosed)  Consult neuropsychology for adjustment and coping skills for pain management  Rheumatoid arthritis involving multiple sites [facility])   Assessment & Plan    Continue plaquenil  - Was also on hydrocodone but titrating off at home  - Monitor for flare  - IM consulted     Chronic kidney disease   Assessment & Plan    Crea stable  Check CMP, Mag, Phos in AM  Monitor  Depression   Assessment & Plan    Continue sertraline  Would avoid lorazepam considering confusion  Has not received on inpatient  Type 2 diabetes mellitus with diabetic polyneuropathy [facility])   Assessment & Plan    Lab Results   Component Value Date    HGBA1C 5 2 01/18/2019       Recent Labs      01/17/19   1216  01/17/19   2104  01/18/19   0648  01/18/19   1118   POCGLU  143*  178*  97  115       Blood Sugar Average: Last 72 hrs:  - Checking Hgb A1C  - Starting on CDI  - Starting Accuchecks  - At home diet controlled  - Placed on diabetic diet  - IM to manage     History of CVA (cerebrovascular accident)   Assessment & Plan    No tim sequelae  Cannot tolerate statins  Continue Plavix  Continue fish oil  At home on other supplements as well  Chronic diastolic heart failure (HCC)   Assessment & Plan    With some volume overload early in hospital stay  Now examines euvolemic  Monitor kidney function on lasix   Continue ARB/BB     Renovascular hypertension   Assessment & Plan    Continue increased Amlodipine 10mg  Continues home Losartan 100mg  Continue metoprolol tartrate 25mg Q12    Change hydralazine PRN to PO  S/p stenting for DONAVAN  IM to manage        Scheduled Meds:    Current Facility-Administered Medications:  acetaminophen 975 mg Oral [facility] Neris Gilmore MD   albuterol 2 puff Inhalation Q4H PRN Neris Gilmore MD   amLODIPine 10 mg Oral Daily Neris Gilmore MD   calcium carbonate-vitamin D 1 tablet Oral BID With Meals Neris Gilmore MD   clopidogrel 75 mg Oral Daily Neris Gilmore MD   docusate sodium 100 mg Oral BID Neris Gilmore MD   enoxaparin 40 mg Subcutaneous Q24H [address] Neris Gilmore MD   fish oil 1,000 mg Oral Daily Neris Gilmore MD   furosemide 20 mg Oral Daily Neris Gilmore MD   gabapentin 300 mg Oral Daily Neris Gilmore MD   gabapentin 600 mg Oral HS Neris Gilmore MD   hydrALAZINE 5 mg Intravenous Q6H PRN Neris Gilmore MD   hydroxychloroquine 200 mg Oral Daily With Breakfast Neris Gilmore MD   insulin lispro 1-5 Units Subcutaneous TID [facility] Neris Gilmore MD   insulin lispro 1-5 Units Subcutaneous HS Neris Gilmore MD   levothyroxine 112 mcg Oral Early Morning Neris Gilmore MD   lidocaine 1 patch Topical Daily Neris Gilmore MD   lidocaine 1 patch Topical Daily Neris Gilmore MD   losartan 100 mg Oral Daily Neris Gilmore MD   methocarbamol 500 mg Oral Q6H PRN Neris Gilmore MD   metoprolol tartrate 25 mg Oral Q12H [address] Neris Gilmore MD   nystatin  Topical BID Neris Gilmore MD   nystatin  Topical BID Neris Gilmore MD   ondansetron 4 mg Intravenous Q4H PRN Neris Gilmore MD   oxyCODONE 2 5 mg Oral Q4H PRN Neris Gilmore MD   oxyCODONE 2 5 mg Oral Q4H PRN Neris Gilmore MD   polyethylene glycol 17 g Oral Daily PRN Neris Gilmore MD   senna 1 tablet Oral HS Neris Gilmore MD   sertraline 25 mg Oral Daily Neris Gilmore MD        Objective:    Functional Update: Pending evals    Allergies per EMR    Physical Exam:  Temp:  [98 °F (36 7 °C)-99 °F (37 2 °C)] 98 °F (36 7 °C)  HR:  [56-59] 56  Resp:  [16-18] 18  BP: (143-167)/(52-90) 150/90  SpO2:  [95 %-100 %] 95 %    General: alert, no apparent distress, cooperative and comfortable  HEENT:  Head: Normocephalic, no lesions, without obvious abnormality  Neck has C-collar in place  NO signs of skin breakdown at occiput/chin  CARDIAC:  regular rate and rhythm, S1, S2 normal, no murmur, click, rub or gallop  LUNGS:  normal air entry, lungs clear to auscultation  ABDOMEN:  soft, non-tender  Bowel sounds normal  No masses, no organomegaly  EXTREMITIES:  No pitting edema noted at this time  NEURO:   normal without focal findings, mental status, speech normal, alert and oriented x3, SANDEEP and Moving all 4 extremities symmetrically  Observed ambulating transfer from wheelchair to chair  PSYCH:  Normal  and Alert and oriented, appropriate affect    INCISION:  None    Diagnostic Studies: Reviewed, no new imaging  No orders to display       Laboratory: Reviewed    Results from last 7 days  Lab Units 01/18/19  0550   HEMOGLOBIN g/dL 10 9*   HEMATOCRIT % 34 0*   WBC Thousand/uL 6 95       Results from last 7 days  Lab Units 01/18/19  0550 01/12/19  0525   BUN mg/dL 31* 20   SODIUM mmol/L 134* 134*   POTASSIUM mmol/L 3 7 3 6   CHLORIDE mmol/L 98* 97*   CREATININE mg/dL 1 14 0 98   AST U/L 35  --    ALT U/L 43  --             Patient Active Problem List   Diagnosis    Closed nondisplaced fracture of second cervical vertebra (HCC)    Neck pain, acute    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)    C6 cervical fracture (HCC)    Hypertension    Hypothyroidism    Fall    Forgetfulness    Ambulatory dysfunction    Physical deconditioning    Acute pain    Delirium    Renovascular hypertension    Chronic diastolic heart failure (HCC)    History of CVA (cerebrovascular accident)    Type 2 diabetes mellitus with diabetic polyneuropathy (HCC)    Depression    Chronic kidney disease    Rheumatoid arthritis involving multiple sites ([facility] [address] )    Fibromyalgia    History of aortic valve replacement with bioprosthetic valve ** Please Note: Fluency Direct voice to text software may have been used in the creation of this document  **    Total visit time:  At least 25 minutes, with more than 50% spent counseling/coordinating care

## 2019-01-18 NOTE — CASE MANAGEMENT
Patient seen for initial evaluation for case management needs  Patient reports living with daughter, LEXA and grandchildren in an apt attached to home with ramp, 1 ISRA  Pt was I pta with use of spcs for mobility  Patient has a cleaning aide 1x per week and is looking to add aides to assist at d/c  Dtr is home 2 days per week to assist as well  Pt has remote history of outpatient therapy and no home care services  IMM form reviewed and signed  Case mgmt will continue to follow and assist with d/c planning needs during the course of the rehab stay

## 2019-01-19 PROCEDURE — 97535 SELF CARE MNGMENT TRAINING: CPT

## 2019-01-19 RX ORDER — GABAPENTIN 300 MG/1
300 CAPSULE ORAL 2 TIMES DAILY
Status: DISCONTINUED | OUTPATIENT
Start: 2019-01-19 | End: 2019-01-28

## 2019-01-19 RX ORDER — MUSCLE RUB CREAM 100; 150 MG/G; MG/G
CREAM TOPICAL 4 TIMES DAILY PRN
Status: DISCONTINUED | OUTPATIENT
Start: 2019-01-19 | End: 2019-01-20

## 2019-01-19 RX ADMIN — LEVOTHYROXINE SODIUM 112 MCG: 112 TABLET ORAL at 05:45

## 2019-01-19 RX ADMIN — ENOXAPARIN SODIUM 40 MG: 40 INJECTION SUBCUTANEOUS at 08:06

## 2019-01-19 RX ADMIN — OXYCODONE HYDROCHLORIDE 2.5 MG: 5 TABLET ORAL at 13:21

## 2019-01-19 RX ADMIN — GABAPENTIN 300 MG: 300 CAPSULE ORAL at 15:04

## 2019-01-19 RX ADMIN — ACETAMINOPHEN 975 MG: 325 TABLET, FILM COATED ORAL at 13:50

## 2019-01-19 RX ADMIN — OXYCODONE HYDROCHLORIDE 2.5 MG: 5 TABLET ORAL at 05:46

## 2019-01-19 RX ADMIN — NYSTATIN 1 APPLICATION: 100000 POWDER TOPICAL at 18:03

## 2019-01-19 RX ADMIN — HYDROXYCHLOROQUINE SULFATE 200 MG: 200 TABLET, FILM COATED ORAL at 09:57

## 2019-01-19 RX ADMIN — METOPROLOL TARTRATE 25 MG: 25 TABLET, FILM COATED ORAL at 08:07

## 2019-01-19 RX ADMIN — GABAPENTIN 300 MG: 300 CAPSULE ORAL at 08:07

## 2019-01-19 RX ADMIN — METOPROLOL TARTRATE 25 MG: 25 TABLET, FILM COATED ORAL at 21:40

## 2019-01-19 RX ADMIN — LIDOCAINE 1 PATCH: 50 PATCH CUTANEOUS at 09:56

## 2019-01-19 RX ADMIN — OXYCODONE HYDROCHLORIDE 2.5 MG: 5 TABLET ORAL at 09:55

## 2019-01-19 RX ADMIN — NYSTATIN 1 APPLICATION: 100000 POWDER TOPICAL at 09:58

## 2019-01-19 RX ADMIN — ACETAMINOPHEN 975 MG: 325 TABLET, FILM COATED ORAL at 21:40

## 2019-01-19 RX ADMIN — AMLODIPINE BESYLATE 10 MG: 10 TABLET ORAL at 08:07

## 2019-01-19 RX ADMIN — METHOCARBAMOL 500 MG: 500 TABLET, FILM COATED ORAL at 13:50

## 2019-01-19 RX ADMIN — ACETAMINOPHEN 975 MG: 325 TABLET, FILM COATED ORAL at 05:45

## 2019-01-19 RX ADMIN — CALCIUM CARBONATE 500 MG (1,250 MG)-VITAMIN D3 200 UNIT TABLET 1 TABLET: at 18:00

## 2019-01-19 RX ADMIN — DOCUSATE SODIUM 100 MG: 100 CAPSULE, LIQUID FILLED ORAL at 18:00

## 2019-01-19 RX ADMIN — OXYCODONE HYDROCHLORIDE 2.5 MG: 5 TABLET ORAL at 20:12

## 2019-01-19 RX ADMIN — METHOCARBAMOL 500 MG: 500 TABLET, FILM COATED ORAL at 20:12

## 2019-01-19 RX ADMIN — CLOPIDOGREL BISULFATE 75 MG: 75 TABLET ORAL at 08:07

## 2019-01-19 RX ADMIN — DOCUSATE SODIUM 100 MG: 100 CAPSULE, LIQUID FILLED ORAL at 08:07

## 2019-01-19 RX ADMIN — CALCIUM CARBONATE 500 MG (1,250 MG)-VITAMIN D3 200 UNIT TABLET 1 TABLET: at 08:06

## 2019-01-19 RX ADMIN — SERTRALINE HYDROCHLORIDE 25 MG: 25 TABLET ORAL at 21:40

## 2019-01-19 RX ADMIN — STANDARDIZED SENNA CONCENTRATE 8.6 MG: 8.6 TABLET ORAL at 21:40

## 2019-01-19 RX ADMIN — FUROSEMIDE 20 MG: 20 TABLET ORAL at 08:06

## 2019-01-19 RX ADMIN — LOSARTAN POTASSIUM 100 MG: 50 TABLET, FILM COATED ORAL at 08:07

## 2019-01-19 RX ADMIN — GABAPENTIN 600 MG: 300 CAPSULE ORAL at 21:40

## 2019-01-19 NOTE — PROGRESS NOTES
Internal Medicine Progress Note  Patient: Bong Morris  Age/sex: 80 y o  female  Medical Record #: [id number]      ASSESSMENT/PLAN:  Bong Morris is seen and examined and mangement for following issues:    Type III odontoid fracture with fracture line extending to the right/left superior articular facets and left transverse foramen of C2; nondisplaced fracture through bulky, bridging osteophytes at the C6 level:  required no surgery  Continue collar  Pt reports severe Rt facial pain today - an afternoon dose of gabapentin will be added      HTN: on Norvasc 10mg qd, Cozaar 100mg qd, Lopressor 25mg q12hrs  BP improving      CKD; baseline 1 1-1 3:  Stable     Hx bioAVR 2012: had no significant disease when had card cath before surgery     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 2016:  continue Plavix that she has been on since CVA 2005; with her TIA 2016, neuro kept Plavix; she is intol statins     DM diet controlled:  stop Accuchecks and watch fastings; not sure she follows a diet at home but had been on Metformin in past but stopped since HbA1C was 5 as OP     Chronic diastolic CHF, LVEF 85%, mild-mod MR/mild TR and norm function AVR 2016:  continue Lasix as at home  Follows with Dr Anastacia Smith  Family says LE edema today is less than at home     RA/SLE:  continue Plaquenil     HLD: intol to statin; continue Red yeast rice and Fish oil when she goes home     Hypothyroidism:  continue current Levothyroxine     Hx right RA stent: BPs since stent controlled     Depression: Zoloft     Chronic pain/fibromyalgia:  on Hydrocodone at home     Hx breast cancer: follows with Olena Mcintosh      Subjective: Patient seen and examined  Pt reports severe Rt facial pain  She states that it is not relieved with pain medications  She reprots diffuse muscle pain as well  She denies any other complaints      ROS:   GI: denies abdominal pain, change bowel habits or reflux symptoms  Neuro: No new neurologic changes  Respiratory: No Cough, SOB  Cardiovascular: No CP, palpitations     Scheduled Meds:    Current Facility-Administered Medications:  acetaminophen 975 mg Oral [facility] Kip Parkinson MD   albuterol 2 puff Inhalation Q4H PRN Kip Parkinson MD   amLODIPine 10 mg Oral Daily Kip Parkinson MD   calcium carbonate-vitamin D 1 tablet Oral BID With Meals Kip Parkinson MD   clopidogrel 75 mg Oral Daily Kip Parkinson MD   docusate sodium 100 mg Oral BID Kip Parkinson MD   enoxaparin 40 mg Subcutaneous Q24H [address] Kip Parkinson MD   fish oil 1,000 mg Oral Daily Kip Parkinson MD   furosemide 20 mg Oral Daily Kip Parkinson MD   gabapentin 300 mg Oral Daily Kip Parkinson MD   gabapentin 600 mg Oral HS Kip Parkinson MD   hydroxychloroquine 200 mg Oral Daily With Breakfast Kip Parkinson MD   levothyroxine 112 mcg Oral Early Morning Kip Parkinson MD   lidocaine 1 patch Topical Daily Kip Parkinson MD   lidocaine 1 patch Topical Daily Kip Parkinson MD   losartan 100 mg Oral Daily Kip Parkinson MD   methocarbamol 500 mg Oral Q6H PRN Kip Parkinson MD   metoprolol tartrate 25 mg Oral Q12H [address] Kip Parkinson MD   nystatin  Topical BID Kip Parkinson MD   nystatin  Topical BID Kip Parkinson MD   oxyCODONE 2 5 mg Oral Q4H PRN Kip Parkinson MD   oxyCODONE 2 5 mg Oral Q4H PRN Kip Parkinson MD   polyethylene glycol 17 g Oral Daily PRN Kip Parkinson MD   senna 1 tablet Oral HS Kip Parkinson MD   sertraline 25 mg Oral Daily Kip Parkinson MD       Labs:       Results from last 7 days  Lab Units 01/18/19  0550   WBC Thousand/uL 6 95   HEMOGLOBIN g/dL 10 9*   HEMATOCRIT % 34 0*   PLATELETS Thousands/uL 182       Results from last 7 days  Lab Units 01/18/19  0550   SODIUM mmol/L 134*   POTASSIUM mmol/L 3 7   CHLORIDE mmol/L 98*   CO2 mmol/L 29   BUN mg/dL 31*   CREATININE mg/dL 1 14   CALCIUM mg/dL 9 1       Results from last 7 days  Lab Units 01/18/19  0550   HEMOGLOBIN A1C % 5 2              Results from last 7 days  Lab Units 01/18/19  1526 01/18/19  1118 01/18/19  0648   POC GLUCOSE mg/dl 124 115 97     [unfilled]    Labs reviewed    Physical Examination:  Vitals:   Vitals:    01/18/19 1325 01/18/19 2059 01/18/19 2107 01/19/19 0514   BP: 145/77 144/70 144/70 144/60   BP Location: Right arm Left arm  Right arm   Pulse: 66 63 63 (!) 54   Resp: 18 18 16   Temp: 98 °F (36 7 °C) 97 9 °F (36 6 °C)  97 9 °F (36 6 °C)   TempSrc: Oral Oral  Oral   SpO2: 94% 98%  97%   Weight:       Height:           PERRLA EOMI no JVD  RESP: CTAB, no R/R/W  CV: +S1 S2, regular rate, no rubs  ABD: soft, NT, ND, normal BS   EXT: No edema  Neuro: AAOx3  [ X ] Total time spent: 30 Mins and greater than 50% of this time was spent counseling/coordinating care  ** Please Note: Dragon 360 Dictation voice to text software may have been used in the creation of this document   **

## 2019-01-19 NOTE — QUICK NOTE
PM&R Brief Note:    Patient with diffuse muscular pains similar to her fibromyalgia  Also complaining of right side facial pain similar to exacerbated trigeminal neuralgia        Plan:  -Order placed for Aqua K pad prn - Avoid cervical area   -Order placed for topical Bengay prn  -Increase Neurontin dose to add a afternoon additional dose of 300mg timed at Fili Manning MD  PM&R

## 2019-01-19 NOTE — PROGRESS NOTES
Occupational Therapy Note     01/19/19 2001   Pain Assessment   Pain Assessment 0-10   Pain Score 6   Pain Type Acute pain;Surgical pain   Pain Location Neck   Pain Orientation Posterior   Hospital Pain Intervention(s) Repositioned; Ambulation/increased activity   Restrictions/Precautions   Precautions Bed/chair alarms;Spinal precautions;Pain; Fall Risk   Braces or Orthoses C/S Collar   Lifestyle   Autonomy "On days when I had my fibromyalgia, I just didn't do anything"   QI: Eating   Comment Pt reports difficulty hand to mouth, may benefit from further assessment for curved utensils or differnet positioning for meals for improved hand to mouth   Grooming   Able To Wash/Dry Face   Limitation Noted In Strength   Findings seated WC level after set up able to wash face   Grooming (FIM) 4 - Patient completed  4/5 tasks   QI: Shower/Bathe Self   Comment pt set up sinksdie for UB bathing, due to time after toileting, decreased assessment of full bathing with OT    Bathing   Assessed Bath Style Sponge Bath   Positioning Seated   Findings  recommend LHAE usage for LB ADLS   QI: Sit to Stand   Assistance Needed Physical assistance   Assistance Provided by [place] Less than 25%   Comment from recliner and from toilet   Sit to Stand CARE Score 3   QI: Chair/Bed-to-Chair Transfer   Assistance Needed Physical assistance   Assistance Provided by [place] Less than 25%   Chair/Bed-to-Chair Transfer CARE Score 3   QI: [address] Needed Physical assistance   Assistance Provided by [place] Total assistance   Comment for BM management due to spinal precautions and obesity   Toileting Hygiene CARE Score 1   Toileting   Able to Pull Clothing down yes, up no     Manage Hygiene Bladder   Limitations Noted In Balance;ROM;Safety   Findings Pt able to perform bladder management, D for BM hygiene  (CM requiring assist to pull up underpants, unilateral suppor)   Toileting (FIM) 3 - Patient completes  50-74% of all tasks   QI: Toilet Transfer   Assistance Needed Physical assistance   Assistance Provided by [place] Less than 25%   Toilet Transfer CARE Score 3   Toilet Transfer   Toilet Transfer (FIM) 4 - Patient completes 75% of all tasks   Other Comments   Assessment Pt reports being educated on changing c-collar pads yesterday with daughter, able to provide some direction for teach back when changing pads this am   Pads requiring re adjust x 1 after donned, improved comfort with same  Assessment   Treatment Assessment Pt seen for skilled OT services this date for functional mobility with RW bed to toilet, toileting, transfers, education on collar pad changing, grooming and UB bathing  Pt requires CG  at this time for functional mobility in room, cue for maneuvering around end of bed, reports burning nerve pain at neck  Able to complete bladder hygiene however assist for BM hygiene due to spinal precautions and obesity  CM requiring min A to pull up over hips with unilateral alternating graps release of RW and/or grab bar, no LOB noted  Seated, pt able to wash face after set up and abdomen  Requesting asssit with meals, to further assess need for curved built up utensils or compensatory OOB positioning while seated upright     Tolerating session well, slow to move, min A from recliner initially, CG from toilet  Occupational performance remains limited in functional mobility, UB and LB dressing, feeding  Pt to benefit from continued skilled OT services to progress with feeding assessment for hand to mouth problem solving with collar, LB ADLS with LHAE review to meet established treatment goals with overall decreased burden of care  Plan   Treatment/Interventions ADL retraining;Functional transfer training; Therapeutic exercise;Patient/family training;Equipment eval/education; Bed mobility; Compensatory technique education;OT   OT Therapy Minutes   OT Time In 0830   OT Time Out 0900   OT Total Time (minutes) 30   OT Mode of treatment - Individual (minutes) 30   OT Mode of treatment - Concurrent (minutes) 0   OT Mode of treatment - Group (minutes) 0   OT Mode of treatment - Co-treat (minutes) 0   OT Mode of Teatment - Total time(minutes) 30 minutes   Therapy Time missed   Time missed?  No

## 2019-01-20 PROCEDURE — 97116 GAIT TRAINING THERAPY: CPT

## 2019-01-20 PROCEDURE — 97535 SELF CARE MNGMENT TRAINING: CPT

## 2019-01-20 PROCEDURE — 97530 THERAPEUTIC ACTIVITIES: CPT

## 2019-01-20 PROCEDURE — 97110 THERAPEUTIC EXERCISES: CPT

## 2019-01-20 RX ORDER — TROLAMINE SALICYLATE 10 G/100G
1 CREAM TOPICAL 4 TIMES DAILY PRN
Status: DISCONTINUED | OUTPATIENT
Start: 2019-01-20 | End: 2019-02-06 | Stop reason: HOSPADM

## 2019-01-20 RX ADMIN — STANDARDIZED SENNA CONCENTRATE 8.6 MG: 8.6 TABLET ORAL at 21:53

## 2019-01-20 RX ADMIN — AMLODIPINE BESYLATE 10 MG: 10 TABLET ORAL at 10:11

## 2019-01-20 RX ADMIN — METOPROLOL TARTRATE 25 MG: 25 TABLET, FILM COATED ORAL at 21:53

## 2019-01-20 RX ADMIN — ACETAMINOPHEN 975 MG: 325 TABLET, FILM COATED ORAL at 14:07

## 2019-01-20 RX ADMIN — LIDOCAINE 1 PATCH: 50 PATCH CUTANEOUS at 09:10

## 2019-01-20 RX ADMIN — NYSTATIN 1 APPLICATION: 100000 POWDER TOPICAL at 10:16

## 2019-01-20 RX ADMIN — GABAPENTIN 300 MG: 300 CAPSULE ORAL at 09:16

## 2019-01-20 RX ADMIN — SERTRALINE HYDROCHLORIDE 25 MG: 25 TABLET ORAL at 21:52

## 2019-01-20 RX ADMIN — FUROSEMIDE 20 MG: 20 TABLET ORAL at 10:05

## 2019-01-20 RX ADMIN — CALCIUM CARBONATE 500 MG (1,250 MG)-VITAMIN D3 200 UNIT TABLET 1 TABLET: at 10:04

## 2019-01-20 RX ADMIN — OXYCODONE HYDROCHLORIDE 2.5 MG: 5 TABLET ORAL at 10:03

## 2019-01-20 RX ADMIN — LIDOCAINE 1 PATCH: 50 PATCH CUTANEOUS at 09:11

## 2019-01-20 RX ADMIN — HYDROXYCHLOROQUINE SULFATE 200 MG: 200 TABLET, FILM COATED ORAL at 10:14

## 2019-01-20 RX ADMIN — NYSTATIN: 100000 CREAM TOPICAL at 18:18

## 2019-01-20 RX ADMIN — METHOCARBAMOL 500 MG: 500 TABLET, FILM COATED ORAL at 12:40

## 2019-01-20 RX ADMIN — DOCUSATE SODIUM 100 MG: 100 CAPSULE, LIQUID FILLED ORAL at 10:05

## 2019-01-20 RX ADMIN — OXYCODONE HYDROCHLORIDE 2.5 MG: 5 TABLET ORAL at 05:47

## 2019-01-20 RX ADMIN — NYSTATIN: 100000 POWDER TOPICAL at 18:18

## 2019-01-20 RX ADMIN — OXYCODONE HYDROCHLORIDE 2.5 MG: 5 TABLET ORAL at 20:26

## 2019-01-20 RX ADMIN — METOPROLOL TARTRATE 25 MG: 25 TABLET, FILM COATED ORAL at 10:11

## 2019-01-20 RX ADMIN — GABAPENTIN 600 MG: 300 CAPSULE ORAL at 21:52

## 2019-01-20 RX ADMIN — OXYCODONE HYDROCHLORIDE 2.5 MG: 5 TABLET ORAL at 18:15

## 2019-01-20 RX ADMIN — CLOPIDOGREL BISULFATE 75 MG: 75 TABLET ORAL at 10:07

## 2019-01-20 RX ADMIN — LEVOTHYROXINE SODIUM 112 MCG: 112 TABLET ORAL at 05:47

## 2019-01-20 RX ADMIN — GABAPENTIN 300 MG: 300 CAPSULE ORAL at 14:08

## 2019-01-20 RX ADMIN — ACETAMINOPHEN 975 MG: 325 TABLET, FILM COATED ORAL at 21:53

## 2019-01-20 RX ADMIN — OXYCODONE HYDROCHLORIDE 2.5 MG: 5 TABLET ORAL at 14:07

## 2019-01-20 RX ADMIN — METHOCARBAMOL 500 MG: 500 TABLET, FILM COATED ORAL at 19:38

## 2019-01-20 RX ADMIN — DOCUSATE SODIUM 100 MG: 100 CAPSULE, LIQUID FILLED ORAL at 16:51

## 2019-01-20 RX ADMIN — CALCIUM CARBONATE 500 MG (1,250 MG)-VITAMIN D3 200 UNIT TABLET 1 TABLET: at 16:51

## 2019-01-20 RX ADMIN — ACETAMINOPHEN 975 MG: 325 TABLET, FILM COATED ORAL at 05:47

## 2019-01-20 RX ADMIN — ENOXAPARIN SODIUM 40 MG: 40 INJECTION SUBCUTANEOUS at 09:13

## 2019-01-20 RX ADMIN — METHOCARBAMOL 500 MG: 500 TABLET, FILM COATED ORAL at 05:47

## 2019-01-20 RX ADMIN — LOSARTAN POTASSIUM 100 MG: 50 TABLET, FILM COATED ORAL at 10:13

## 2019-01-20 NOTE — PROGRESS NOTES
01/20/19 0855   Pain Assessment   Pain Assessment 0-10   Pain Score 4   Pain Location Shoulder   Pain Orientation Bilateral   Pain Descriptors Aching;Burning   Pain Frequency Constant/continuous   Restrictions/Precautions   Precautions Fall Risk;Pain;Spinal precautions;Supervision on toilet/commode   Braces or Orthoses C/S Collar   Cognition   Overall Cognitive Status Impaired   Subjective   Subjective pt c/o pain in B/L shoulders; requesting to get pain patches from nursing   QI: Sit to Stand   Assistance Needed Physical assistance   Assistance Provided by [place] 25%-49%   Comment fluctuates based on fatigue   Sit to Stand CARE Score 3   Bed Mobility   Findings pt sleeps in a recliner at home and does not like to lay flat   QI: Chair/Bed-to-Chair Transfer   Assistance Needed Physical assistance; Adaptive equipment   Assistance Provided by [place] Less than 25%   Chair/Bed-to-Chair Transfer CARE Score 3   Transfer Bed/Chair/Wheelchair   Limitations Noted In Endurance;Pain Management;LE Strength   Adaptive Equipment Roller Walker   Stand Pivot Contact Guard   Sit to Stand Minimal Assist   Stand to [facility]   Findings based on pain and fatigue pt performs STS CGA-Deondre    Bed, Chair, Wheelchair Transfer (FIM) 3 - [place] needs to lift, boost or assist to stand OR sit   QI: Car Transfer   Reason if not Attempted Safety concerns   Car Transfer CARE Score 88   QI: Walk 10 Feet   Assistance Needed Physical assistance; Adaptive equipment   Assistance Provided by [place] Less than 25%   Walk 10 Feet CARE Score 3   QI: Walk 50 Feet with Two Turns   Reason if not Attempted Safety concerns   Walk 50 Feet with Two Turns CARE Score 88   QI: Walk 150 Feet   Reason if not Attempted Safety concerns   Walk 150 Feet CARE Score 88   QI: Walking 10 Feet on Uneven Surfaces   Reason if not Attempted Safety concerns   Walking 10 Feet on Uneven Surfaces CARE Score 88   Ambulation   Does the patient walk? 2   Yes   Primary Discharge Mode of Locomotion Walk   Walk Assist Level Contact Guard;Minimum Assist   Gait Pattern Inconsistant Jenny; Slow Jenny;Decreased foot clearance; Improper weight shift   Assist Device Roller Kerline Fang Walked (feet) 40 ft  (x2)   Limitations Noted In Balance; Endurance; Safety;Posture;Speed;Strength   Findings pt requires increase time, pt very slow gait  cues to keep RW closer to avoid outstretched and increasing pain   Walking (FIM) 1 - Patient ambulates less than 49 feet regardless of assist/device/set up   Wheelchair mobility   QI: Does the patient use a wheelchair? 0  No   QI: 1 Step (Curb)   Reason if not Attempted Safety concerns   1 Step (Curb) CARE Score 88   QI: 4 Steps   Reason if not Attempted Safety concerns   4 Steps CARE Score 88   QI: 12 Steps   Reason if not Attempted Safety concerns   12 Steps CARE Score 88   Therapeutic Interventions   Strengthening seated LAQ x20  STS 2x5 for functional strengthening   Flexibility seated scap retraction no resistance  x20   Assessment   Treatment Assessment pt with poor activity tolerance today due to pain  pt given pain meds in the morning and not due until after therapy session  pt states the full 1 1/2 hours is a lot at this time  pt educated on rehab is 3hours a day, but will try and space sessions out for rest   pt very slow gait, increasing fall risk at this time  pt to cont focus on functional txs, strengthening and pain management to progress with funcitonal mobility and safety  Problem List Decreased strength;Decreased range of motion;Decreased endurance; Impaired balance;Decreased mobility; Decreased coordination;Decreased safety awareness;Orthopedic restrictions;Pain   Barriers to Discharge Inaccessible home environment   Plan   Treatment/Interventions Functional transfer training;LE strengthening/ROM; Endurance training;Patient/family training;Gait training   Progress Slow progress, decreased activity tolerance   Recommendation   Recommendation Short-term skilled PT; Home PT;24 hour supervision/assist;Home with family support   PT Therapy Minutes   PT Time In 0830   PT Time Out 1000   PT Total Time (minutes) 90   PT Mode of treatment - Individual (minutes) 90   PT Mode of treatment - Concurrent (minutes) 0   PT Mode of treatment - Group (minutes) 0   PT Mode of treatment - Co-treat (minutes) 0   PT Mode of Teatment - Total time(minutes) 90 minutes   Therapy Time missed   Time missed?  No

## 2019-01-20 NOTE — PROGRESS NOTES
Patient has been sleeping in chair  This is what she dose at home, however I did educate her on needing to spend time in the bed besides chair only  C/o how she is put into bed, per patient  Please use 2 people to assist patient to bed with least amount of pain if possible   ty

## 2019-01-20 NOTE — PROGRESS NOTES
01/20/19 1050   Pain Assessment   Pain Assessment 0-10   Pain Score 4   Pain Type Acute pain;Surgical pain   Pain Location Head;Neck; Shoulder   Pain Orientation Bilateral;Posterior   Pain Descriptors Aching;Nagging; Throbbing   Pain Frequency Constant/continuous   Pain Onset Ongoing   Clinical Progression Not changed   Hospital Pain Intervention(s) Repositioned; Rest   Response to Interventions tolerated session   Restrictions/Precautions   Precautions Fall Risk;Pain;Spinal precautions;Supervision on toilet/commode   Braces or Orthoses C/S Collar   QI: Eating   Assistance Needed Set-up / clean-up   Assistance Provided by [place] No physical assistance   Comment Pt with only finger foods to inc indep with feeding   Eating CARE Score 5   Eating Assessment   Positioning Upright;Out of Bed   Meal Assessed Lunch   Findings Pt ordered only foods that she can eat with her fingers and reports that she is able to feed herself a little better  Would benefit from self-feeding modification to inc indep   Eating (FIM) 5 - Patient needs help to open contianers or set up tray   Grooming   Able To Wash/Dry Hands; Wash/Dry Face;Comb/Brush Hair   Limitation Noted In Strength   Findings Pt in stance at sink  Pt with limited ability to perform grooming tasks 2* pain with dynamic reach to acquire items and reach to face and head  Grooming (FIM) 2 - Patient completed 1/4  tasks   QI: Sit to Stand   Assistance Needed Physical assistance   Assistance Provided by [place] 25%-49%   Sit to Stand CARE Score 3   QI: Chair/Bed-to-Chair Transfer   Assistance Needed Physical assistance   Assistance Provided by [place] 25%-49%   Comment once in stance Deondre with RW   Chair/Bed-to-Chair Transfer CARE Score 3   Transfer Bed/Chair/Wheelchair   Positioning Concerns (pain)   Limitations Noted In Balance; Endurance;Confidence;UE Strength;LE Strength   Adaptive Equipment Roller Walker   Findings Pt req modA for sit>stand but then performs pivot and functional mobility at [address] with RW  Pt req ext time  Pt reports that RW is causing pain in UEs  Bed, Chair, Wheelchair Transfer (FIM) 3 - [place] needs to lift, boost or assist to stand OR sit   QI: [address] Needed Physical assistance   Assistance Provided by [place] 50%-74%   Comment dec ROM, pain in shoulders   [address] Score 2   Toileting   Able to [address] down no, up no  Manage Hygiene Bladder   Limitations Noted In Balance; Coordination; Safety;LE Strength   Adaptive Equipment Grab Bar   Findings performed [place] hygiene but req A for CM this session 2* pain   Toileting (FIM) 2 - Patient completes 25-49% of all tasks   QI: Toilet Transfer   Assistance Needed Physical assistance   Assistance Provided by [place] Less than 25%   Comment Deondre with grab bar   Toilet Transfer CARE Score 3   Toilet Transfer   Surface Assessed Standard Toilet   Transfer Technique Standard   Limitations Noted In Balance; Endurance;ROM;UE Strength;LE Strength   Adaptive Equipment Grab Bar   Findings Pt req use of grab bar with BUEs for sit<>stand from toilet   Toilet Transfer (FIM) 4 - Patient completes 75% of all tasks   Exercise Tools   Exercise Tools Yes   Other Exercise Tool 1 Pt engaged in towel glides with BUEs on table top to inc UE ROM to promote inc dynamic reach to acquire grooming tasks, perform self feeding and perform toileting tasks  Pt with limit range during initial movements but with repetition pt demo inc range  Pt reports that pain lessens with inc movement  Cognition   Overall Cognitive Status Impaired   Arousal/Participation Cooperative   Attention Attends with cues to redirect   Orientation Level Oriented X4   Memory Decreased recall of precautions   Following Commands Follows one step commands with increased time or repetition   Activity Tolerance   Activity Tolerance Patient limited by pain; Patient limited by fatigue   Assessment   Treatment Assessment Pt participated in skilled OT session focusing on toileting, grooming, functional transfers, and UE ROM  Pt cont to be limited by pain in neck, shoulders, and head limiting confidence and motivation to participate in tasks  Pt tolerated ROM while engaging in conversation regarding her dogs and chickens  Pt motivated to get home to be able to take care of them again  Pt would benefit from cont therapy focusing on inc ROM, dynamic balance, and pain managmenet to inc indep with ADL and IADL tasks  Cont with POC  Prognosis Fair   Problem List Decreased strength;Decreased range of motion;Decreased endurance; Impaired balance;Decreased mobility; Decreased coordination;Decreased safety awareness;Orthopedic restrictions;Pain   Barriers to Discharge Inaccessible home environment   Plan   Treatment/Interventions ADL retraining;Functional transfer training; Therapeutic exercise; Endurance training;Bed mobility   Progress Slow progress, decreased activity tolerance   OT Therapy Minutes   OT Time In 1050   OT Time Out 1150   OT Total Time (minutes) 60   OT Mode of treatment - Individual (minutes) 60   OT Mode of treatment - Concurrent (minutes) 0   OT Mode of treatment - Group (minutes) 0   OT Mode of treatment - Co-treat (minutes) 0   OT Mode of Teatment - Total time(minutes) 60 minutes   Therapy Time missed   Time missed?  No

## 2019-01-20 NOTE — PROGRESS NOTES
01/20/19 1230   Pain Assessment   Pain Assessment 0-10   Pain Score 7   Pain Type Acute pain   Pain Location Head;Face   Pain Orientation Posterior;Bilateral   Pain Descriptors Pressure; Joseph Patches; Shooting   Pain Frequency Constant/continuous   Pain Onset Ongoing   Clinical Progression Not changed   Effect of Pain on Daily Activities pain of 4 at rest, inc to 7 w/ movement   Patient's Stated Pain Goal No pain   Hospital Pain Intervention(s) Repositioned; Other (Comment); Emotional support;Relaxation technique; Rest  (RN notified, admin pain meds)   Response to Interventions Pt participation limited 2* pain   Restrictions/Precautions   Precautions Fall Risk;Spinal precautions;Pain;Supervision on toilet/commode   ROM Restrictions Yes  (spinal precautions)   Braces or Orthoses C/S Collar   QI: Eating   Assistance Needed Set-up / clean-up   Eating CARE Score 5   Eating Assessment   Positioning Upright;Out of Bed   Intake Mode PO;Self   Findings Pt seated upright position to eat applesauce utilizing curved soup spoon in effort to inc pt ease and indep of feeding during meals and dec pain w/ lifting UE to mouth  OT trialed pillow behind pt in w/c to inc upright positioning, inc height of bedside table to bring food closer, and curved spoon  Pt notes curved spoon is easier to utilize, however the soup spoon is too large for pt 's mouth  Pt benefits from smaller curved spoon, however therapy does not have smaller spoon on CW4 or 9 at this time  Pt reports having curved spoons at home utilized w/ her  in the past, pt plan to have DTR bring in curved utensils during next visit      Eating (FIM) 5 - Patient requires supervision, cueing or coaxing   QI: Sit to Stand   Assistance Needed Physical assistance   Assistance Provided by [place] 25%-49%   Sit to Stand CARE Score 3   QI: Chair/Bed-to-Chair Transfer   Assistance Needed Physical assistance   Assistance Provided by [place] Less than 25%   Chair/Bed-to-Chair Transfer CARE Score 3   Transfer Bed/Chair/Wheelchair   Limitations Noted In Balance; Endurance;UE Strength;LE Strength;Pain Management   Adaptive Equipment Roller Walker   Stand Pivot Contact Guard   Sit to Stand Minimal;Moderate Assist   Stand to Sit Minimal   Findings Pt engaged in repetitive sit<>stand to inc indep w/ focus on utilizing LE more than UE in effort to dec UE pain  Pt varrying Deondre-modA for sit>stand depending on fatigue/pain level  Pt cont to require VC for safe hand placement  Bed, Chair, Wheelchair Transfer (FIM) 3 - [place] needs to lift, boost or assist to stand OR sit   Cognition   Overall Cognitive Status Impaired   Arousal/Participation Cooperative   Attention Attends with cues to redirect   Orientation Level Oriented X4   Memory Decreased recall of precautions   Following Commands Follows one step commands with increased time or repetition   Activity Tolerance   Activity Tolerance Patient limited by pain   Assessment   Treatment Assessment Pt participated in skilled OT Tx session w/ focus on feeding/adaptive utensils, repetitive transfer training, and repositioning pt for optimal comfort  See above note for detail  Pt unable to see TV in room without straining neck  OT returned pt to recliner at end of session and repositioned all pillows and level of recliner to inc pt comfort and ability to see TV  Several different positions trialed until pt report G comfort level and ability to see TV without strain  Pt cont to have poor tolerance for standing/ambulation and limited by pain in face/neck  Cont OT POC w/ focus on pain mgmt, stand tolerance, endurance, light UE strengthening/ROM, safety awareness and C/S collar mgmt to maximize pt indep w/ ADLs/IADLs and all fxnl transfers  Prognosis Good   Problem List Decreased strength;Decreased range of motion;Decreased endurance; Impaired balance;Decreased mobility; Decreased coordination;Decreased safety awareness;Orthopedic restrictions;Pain   Barriers to Discharge Inaccessible home environment   Plan   Treatment/Interventions ADL retraining;Functional transfer training;LE strengthening/ROM; Therapeutic exercise;Equipment eval/education; Compensatory technique education   Progress Slow progress, decreased activity tolerance   OT Therapy Minutes   OT Time In 1230   OT Time Out 1330   OT Total Time (minutes) 60   OT Mode of treatment - Individual (minutes) 60   OT Mode of treatment - Concurrent (minutes) 0   OT Mode of treatment - Group (minutes) 0   OT Mode of treatment - Co-treat (minutes) 0   OT Mode of Teatment - Total time(minutes) 60 minutes   Therapy Time missed   Time missed?  Yes

## 2019-01-20 NOTE — PROGRESS NOTES
Internal Medicine Progress Note  Patient: Darren Player  Age/sex: 80 y o  female  Medical Record #: [id number]      ASSESSMENT/PLAN:  Darren Player is seen and examined and mangement for following issues:    Type III odontoid fracture with fracture line extending to the right/left superior articular facets and left transverse foramen of C2; nondisplaced fracture through bulky, bridging osteophytes at the C6 level:  required no surgery  Continue collar  Pt reportedsevere Rt facial pain yesterday and ended afternoon dose of gabapentin was added  Facial pain has improved today      HTN: on Norvasc 10mg qd, Cozaar 100mg qd, Lopressor 25mg q12hrs  BP improving      CKD; baseline 1 1-1 3:  Stable     Hx bioAVR 2012: had no significant disease when had card cath before surgery     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 2016:  continue Plavix that she has been on since CVA 2005; with her TIA 2016, neuro kept Plavix; she is intol statins     DM diet controlled:   watch fasting blood sugars      Chronic diastolic CHF, LVEF 54%, mild-mod MR/mild TR and norm function AVR 2016:  continue Lasix as at home  Follows with Dr Capone Room  Family says LE edema today is less than at home     RA/SLE:  continue Plaquenil     HLD: intol to statin; continue Red yeast rice and Fish oil when she goes home     Hypothyroidism:  continue current Levothyroxine     Hx right RA stent: BPs since stent controlled     Depression: Zoloft     Chronic pain/fibromyalgia:  on Hydrocodone at home     Hx breast cancer: follows with Liu Kingsley      Subjective: Patient seen and examined  Patient reports right facial pain is better today  She denies any other complaints      ROS:   GI: denies abdominal pain, change bowel habits or reflux symptoms  Neuro: +Right facial pain  Respiratory: No Cough, SOB  Cardiovascular: No CP, palpitations     Scheduled Meds:    Current Facility-Administered Medications:  acetaminophen 975 mg Oral Q8H [address] Farhad Bowman MD   albuterol 2 puff Inhalation Q4H PRN Ishan Herman MD   amLODIPine 10 mg Oral Daily Ishan Herman MD   calcium carbonate-vitamin D 1 tablet Oral BID With Meals Ishan Herman MD   clopidogrel 75 mg Oral Daily Ishan Herman MD   docusate sodium 100 mg Oral BID Isahn Herman MD   enoxaparin 40 mg Subcutaneous Q24H [address] Ishan Herman MD   fish oil 1,000 mg Oral Daily Ishan Herman MD   furosemide 20 mg Oral Daily Ishan Herman MD   gabapentin 300 mg Oral BID Ewelina Perales PA-C   gabapentin 600 mg Oral HS Ishan Herman MD   hydroxychloroquine 200 mg Oral Daily With Breakfast Ishan Herman MD   levothyroxine 112 mcg Oral Early Morning Ishan Herman MD   lidocaine 1 patch Topical Daily Ishan Herman MD   lidocaine 1 patch Topical Daily Ishan Herman MD   losartan 100 mg Oral Daily Ishan Herman MD   methocarbamol 500 mg Oral Q6H PRN Ishan Herman MD   metoprolol tartrate 25 mg Oral Q12H [address] Ishan Herman MD   NON FORMULARY 1 application Topical 4x Daily PRN Ewelina Perales PA-C   nystatin  Topical BID Ishan Herman MD   nystatin  Topical BID Ishan Herman MD   oxyCODONE 2 5 mg Oral Q4H PRN Ishan Herman MD   oxyCODONE 2 5 mg Oral Q4H PRN Ishan Herman MD   polyethylene glycol 17 g Oral Daily PRN Ishan Herman MD   senna 1 tablet Oral HS Ishan Herman MD   sertraline 25 mg Oral Daily Ishan Herman MD       Labs:       Results from last 7 days  Lab Units 01/18/19  0550   WBC Thousand/uL 6 95   HEMOGLOBIN g/dL 10 9*   HEMATOCRIT % 34 0*   PLATELETS Thousands/uL 182       Results from last 7 days  Lab Units 01/18/19  0550   SODIUM mmol/L 134*   POTASSIUM mmol/L 3 7   CHLORIDE mmol/L 98*   CO2 mmol/L 29   BUN mg/dL 31*   CREATININE mg/dL 1 14   CALCIUM mg/dL 9 1       Results from last 7 days  Lab Units 01/18/19  0550   HEMOGLOBIN A1C % 5 2              Results from last 7 days  Lab Units 01/18/19  1526 01/18/19  1118 01/18/19  0648   POC GLUCOSE mg/dl 124 115 97     @ARI@    Labs reviewed    Physical Examination:  Vitals:   Vitals:    01/19/19 1459 01/19/19 2109 01/20/19 1000 01/20/19 1011   BP: 170/72 143/74 150/60    BP Location: Left arm Right arm Left arm    Pulse: 58 62  (!) 53   Resp: 18 16     Temp: 97 6 °F (36 4 °C) 97 9 °F (36 6 °C)     TempSrc: Oral Oral     SpO2: 98% 94%     Weight:       Height:           PERRLA EOMI no JVD  RESP: CTAB, no R/R/W  CV: +S1 S2, regular rate, no rubs  ABD: soft, NT, ND, normal BS   EXT: No edema  Neuro: AAOx3  [ X ] Total time spent: 30 Mins and greater than 50% of this time was spent counseling/coordinating care  ** Please Note: Dragon 360 Dictation voice to text software may have been used in the creation of this document   **

## 2019-01-21 PROBLEM — E87.1 HYPONATREMIA: Status: ACTIVE | Noted: 2019-01-21

## 2019-01-21 LAB
ANION GAP SERPL CALCULATED.3IONS-SCNC: 5 MMOL/L (ref 4–13)
BASOPHILS # BLD AUTO: 0.05 THOUSANDS/ΜL (ref 0–0.1)
BASOPHILS NFR BLD AUTO: 1 % (ref 0–1)
BUN SERPL-MCNC: 30 MG/DL (ref 5–25)
CALCIUM SERPL-MCNC: 9.1 MG/DL (ref 8.3–10.1)
CHLORIDE SERPL-SCNC: 98 MMOL/L (ref 100–108)
CHLORIDE UR-SCNC: 50 MMOL/L (ref 10–330)
CO2 SERPL-SCNC: 27 MMOL/L (ref 21–32)
CREAT SERPL-MCNC: 0.99 MG/DL (ref 0.6–1.3)
EOSINOPHIL # BLD AUTO: 0.11 THOUSAND/ΜL (ref 0–0.61)
EOSINOPHIL NFR BLD AUTO: 2 % (ref 0–6)
ERYTHROCYTE [DISTWIDTH] IN BLOOD BY AUTOMATED COUNT: 13.2 % (ref 11.6–15.1)
GFR SERPL CREATININE-BSD FRML MDRD: 52 ML/MIN/1.73SQ M
GLUCOSE SERPL-MCNC: 109 MG/DL (ref 65–140)
HCT VFR BLD AUTO: 36.7 % (ref 34.8–46.1)
HGB BLD-MCNC: 12.1 G/DL (ref 11.5–15.4)
IMM GRANULOCYTES # BLD AUTO: 0.06 THOUSAND/UL (ref 0–0.2)
IMM GRANULOCYTES NFR BLD AUTO: 1 % (ref 0–2)
LYMPHOCYTES # BLD AUTO: 1.48 THOUSANDS/ΜL (ref 0.6–4.47)
LYMPHOCYTES NFR BLD AUTO: 22 % (ref 14–44)
MCH RBC QN AUTO: 32.2 PG (ref 26.8–34.3)
MCHC RBC AUTO-ENTMCNC: 33 G/DL (ref 31.4–37.4)
MCV RBC AUTO: 98 FL (ref 82–98)
MONOCYTES # BLD AUTO: 0.55 THOUSAND/ΜL (ref 0.17–1.22)
MONOCYTES NFR BLD AUTO: 8 % (ref 4–12)
NEUTROPHILS # BLD AUTO: 4.44 THOUSANDS/ΜL (ref 1.85–7.62)
NEUTS SEG NFR BLD AUTO: 66 % (ref 43–75)
NRBC BLD AUTO-RTO: 0 /100 WBCS
OSMOLALITY UR/SERPL-RTO: 287 MMOL/KG (ref 282–298)
OSMOLALITY UR: 388 MMOL/KG
PLATELET # BLD AUTO: 205 THOUSANDS/UL (ref 149–390)
PMV BLD AUTO: 9.2 FL (ref 8.9–12.7)
POTASSIUM SERPL-SCNC: 4.1 MMOL/L (ref 3.5–5.3)
RBC # BLD AUTO: 3.76 MILLION/UL (ref 3.81–5.12)
SODIUM 24H UR-SCNC: 56 MOL/L
SODIUM SERPL-SCNC: 130 MMOL/L (ref 136–145)
TSH SERPL DL<=0.05 MIU/L-ACNC: 3.02 UIU/ML (ref 0.36–3.74)
URATE SERPL-MCNC: 5.9 MG/DL (ref 2–6.8)
WBC # BLD AUTO: 6.69 THOUSAND/UL (ref 4.31–10.16)

## 2019-01-21 PROCEDURE — 97530 THERAPEUTIC ACTIVITIES: CPT

## 2019-01-21 PROCEDURE — 84300 ASSAY OF URINE SODIUM: CPT | Performed by: NURSE PRACTITIONER

## 2019-01-21 PROCEDURE — 82436 ASSAY OF URINE CHLORIDE: CPT | Performed by: NURSE PRACTITIONER

## 2019-01-21 PROCEDURE — 84443 ASSAY THYROID STIM HORMONE: CPT | Performed by: NURSE PRACTITIONER

## 2019-01-21 PROCEDURE — 85025 COMPLETE CBC W/AUTO DIFF WBC: CPT | Performed by: NURSE PRACTITIONER

## 2019-01-21 PROCEDURE — 84550 ASSAY OF BLOOD/URIC ACID: CPT | Performed by: NURSE PRACTITIONER

## 2019-01-21 PROCEDURE — 97110 THERAPEUTIC EXERCISES: CPT

## 2019-01-21 PROCEDURE — 83930 ASSAY OF BLOOD OSMOLALITY: CPT | Performed by: NURSE PRACTITIONER

## 2019-01-21 PROCEDURE — 83935 ASSAY OF URINE OSMOLALITY: CPT | Performed by: NURSE PRACTITIONER

## 2019-01-21 PROCEDURE — 80048 BASIC METABOLIC PNL TOTAL CA: CPT | Performed by: NURSE PRACTITIONER

## 2019-01-21 PROCEDURE — 99232 SBSQ HOSP IP/OBS MODERATE 35: CPT | Performed by: PHYSICAL MEDICINE & REHABILITATION

## 2019-01-21 RX ADMIN — FUROSEMIDE 20 MG: 20 TABLET ORAL at 08:33

## 2019-01-21 RX ADMIN — METHOCARBAMOL 500 MG: 500 TABLET, FILM COATED ORAL at 07:09

## 2019-01-21 RX ADMIN — DOCUSATE SODIUM 100 MG: 100 CAPSULE, LIQUID FILLED ORAL at 08:33

## 2019-01-21 RX ADMIN — METOPROLOL TARTRATE 25 MG: 25 TABLET, FILM COATED ORAL at 21:08

## 2019-01-21 RX ADMIN — OXYCODONE HYDROCHLORIDE 2.5 MG: 5 TABLET ORAL at 12:20

## 2019-01-21 RX ADMIN — LIDOCAINE 1 PATCH: 50 PATCH CUTANEOUS at 08:37

## 2019-01-21 RX ADMIN — ENOXAPARIN SODIUM 40 MG: 40 INJECTION SUBCUTANEOUS at 08:34

## 2019-01-21 RX ADMIN — HYDROXYCHLOROQUINE SULFATE 200 MG: 200 TABLET, FILM COATED ORAL at 08:38

## 2019-01-21 RX ADMIN — GABAPENTIN 300 MG: 300 CAPSULE ORAL at 14:15

## 2019-01-21 RX ADMIN — STANDARDIZED SENNA CONCENTRATE 8.6 MG: 8.6 TABLET ORAL at 21:09

## 2019-01-21 RX ADMIN — ACETAMINOPHEN 975 MG: 325 TABLET, FILM COATED ORAL at 14:15

## 2019-01-21 RX ADMIN — LEVOTHYROXINE SODIUM 112 MCG: 112 TABLET ORAL at 07:09

## 2019-01-21 RX ADMIN — NYSTATIN 1 APPLICATION: 100000 POWDER TOPICAL at 08:38

## 2019-01-21 RX ADMIN — CALCIUM CARBONATE 500 MG (1,250 MG)-VITAMIN D3 200 UNIT TABLET 1 TABLET: at 08:29

## 2019-01-21 RX ADMIN — DOCUSATE SODIUM 100 MG: 100 CAPSULE, LIQUID FILLED ORAL at 17:40

## 2019-01-21 RX ADMIN — GABAPENTIN 600 MG: 300 CAPSULE ORAL at 21:08

## 2019-01-21 RX ADMIN — NYSTATIN: 100000 POWDER TOPICAL at 17:41

## 2019-01-21 RX ADMIN — LOSARTAN POTASSIUM 100 MG: 50 TABLET, FILM COATED ORAL at 08:32

## 2019-01-21 RX ADMIN — SERTRALINE HYDROCHLORIDE 25 MG: 25 TABLET ORAL at 21:08

## 2019-01-21 RX ADMIN — OXYCODONE HYDROCHLORIDE 2.5 MG: 5 TABLET ORAL at 16:30

## 2019-01-21 RX ADMIN — OXYCODONE HYDROCHLORIDE 2.5 MG: 5 TABLET ORAL at 21:08

## 2019-01-21 RX ADMIN — CLOPIDOGREL BISULFATE 75 MG: 75 TABLET ORAL at 08:33

## 2019-01-21 RX ADMIN — METOPROLOL TARTRATE 25 MG: 25 TABLET, FILM COATED ORAL at 08:33

## 2019-01-21 RX ADMIN — ACETAMINOPHEN 975 MG: 325 TABLET, FILM COATED ORAL at 07:08

## 2019-01-21 RX ADMIN — LIDOCAINE 1 PATCH: 50 PATCH CUTANEOUS at 08:35

## 2019-01-21 RX ADMIN — CALCIUM CARBONATE 500 MG (1,250 MG)-VITAMIN D3 200 UNIT TABLET 1 TABLET: at 15:56

## 2019-01-21 RX ADMIN — OXYCODONE HYDROCHLORIDE 2.5 MG: 5 TABLET ORAL at 07:09

## 2019-01-21 RX ADMIN — ACETAMINOPHEN 975 MG: 325 TABLET, FILM COATED ORAL at 21:09

## 2019-01-21 RX ADMIN — METHOCARBAMOL 500 MG: 500 TABLET, FILM COATED ORAL at 12:21

## 2019-01-21 RX ADMIN — METHOCARBAMOL 500 MG: 500 TABLET, FILM COATED ORAL at 18:58

## 2019-01-21 RX ADMIN — GABAPENTIN 300 MG: 300 CAPSULE ORAL at 08:33

## 2019-01-21 NOTE — ASSESSMENT & PLAN NOTE
Resolved  - Patient continuing Fluid restriction   - Sodium tabs discontinued  - Resolved with most recent Na on 1/31/18 136 < 135 <137  Imaging per prior providers:  - CTCAP to rule out other causes - unremarkable except for possible pulmonary nodule  - CTH unremarkable except for old, stable lacunar infarct  - Remainder of work-up unrevealing    - Restarted on furosemide  - Will monitor sodium periodically

## 2019-01-21 NOTE — PROGRESS NOTES
01/21/19 0830   Pain Assessment   Pain Score 5   Pain Location Neck; Shoulder   Hospital Pain Intervention(s) Repositioned; Ambulation/increased activity   Response to Interventions end of session repositioned in recliner with arms supported by pillows and head supported with pillows and pt reported feeling better    Restrictions/Precautions   Precautions Fall Risk;Spinal precautions;Pain   Braces or Orthoses C/S Collar   Cognition   Arousal/Participation Cooperative   Subjective   Subjective pt reports feeling okay, but having had a rough morning  she reported inc pain in her neck earlier in the day adn it took some time to get pain meds    QI: Sit to Stand   Assistance Needed Physical assistance; Adaptive equipment   Assistance Provided by [place] 50%-74%   Sit to Stand CARE Score 2   QI: Chair/Bed-to-Chair Transfer   Assistance Needed Physical assistance; Adaptive equipment   Assistance Provided by [place] 50%-74%   Chair/Bed-to-Chair Transfer CARE Score 2   Transfer Bed/Chair/Wheelchair   Limitations Noted In Balance; Endurance;UE Strength;LE Strength   Adaptive Equipment Roller Walker   Stand Pivot Moderate Assist;Minimal Assist   Sit to Stand Moderate Assist;Minimal Assist   Stand to Sit Minimal Assist   Bed, Chair, Wheelchair Transfer (FIM) 2 - [place] needs to lift or boost to rise AND assist to sit   QI: Walk 10 Feet   Assistance Needed Supervision; Adaptive equipment   Walk 10 Feet CARE Score 4   QI: Walk 50 Feet with Two Turns   Assistance Needed Supervision; Adaptive equipment   Walk 50 Feet with Two Turns CARE Score 4   Ambulation   Primary Discharge Mode of Locomotion Walk   Walk Assist Level Close Supervision   Gait Pattern Inconsistant Jenny; Slow Jenny;Decreased foot clearance; Improper weight shift;Decreased L stance;Decreased R stance; Step to; Step through   Assist Device Lisa Fang Walked (feet) 20 ft  (x2, 50 x2)   Limitations Noted In Balance; Endurance; Heel Strike;Speed;Strength;Swing Findings 20' was into  / out of the bathroom   Walking (FIM) 2 - Patient ambulates between 50 - 149 feet regardless of assist/device/set up   QI: Toilet Transfer   Assistance Needed Physical assistance; Adaptive equipment   Assistance Provided by [place] Less than 25%   Toilet Transfer CARE Score 3   Toilet Transfer   Surface Assessed Standard Toilet   Limitations Noted In Balance; Endurance;UE Strength;LE Strength   Adaptive Equipment Grab Bar   Findings Mark up and down   Toilet Transfer (FIM) 2 - [place] needs to lift or boost to rise AND assist to sit   Therapeutic Interventions   Strengthening seated marching and LAQ with 3lb ankle weight both legs 2x5-6  resisted knee flexion and hip abd 3x10 BLE against green theraband    Other Comments   Comments start of session pt was getting meds from nursing; donned socks and sneakers and got ready for therapy; pt needed to use the bathroom, so also stood at sink to wash hands  Assessment   Treatment Assessment Pt cont to need help to stand due to cont BLE weakness/core weakness, as well as continued neck pain, however she needs slightly less help than on eval  Pt cont to ambulate at very slow speed, which she reports is normal for her  Pt will cont to benefit from skilled PT to further progress activity tolerance, BLE strength and standing balance/righting reactions to improve safety and dec fall risk  Talked with nursing and Dr David Bernard about pt request to sleep in the recliner; she sleeps in a recliner at home  Put an extra soft care cushion on the recliner for skin care purposes  ISRA cont to be a barrier for home  Barriers to Discharge Inaccessible home environment;Decreased caregiver support   PT Barriers   Physical Impairment Decreased strength;Decreased range of motion;Decreased endurance; Impaired balance;Decreased mobility;Pain   Functional Limitation Car transfers;Stair negotiation;Standing;Transfers; Walking   Plan   Treatment/Interventions Functional transfer training;LE strengthening/ROM; Therapeutic exercise; Endurance training;Patient/family training;Equipment eval/education; Bed mobility;Gait training   Progress Progressing toward goals   Recommendation   Recommendation Outpatient PT; Home with family support;24 hour supervision/assist   Equipment Recommended Walker   PT Therapy Minutes   PT Time In 0830   PT Time Out 1000   PT Total Time (minutes) 90   PT Mode of treatment - Individual (minutes) 90   PT Mode of treatment - Concurrent (minutes) 0   PT Mode of treatment - Group (minutes) 0   PT Mode of treatment - Co-treat (minutes) 0   PT Mode of Teatment - Total time(minutes) 90 minutes   Therapy Time missed   Time missed?  No

## 2019-01-21 NOTE — PROGRESS NOTES
01/21/19 4697 [facility] Involvement Patient active with [facility] active in [address] [place] Aware/Contacted Leader/[facility] aware of patient's status   Spiritual Beliefs/Perceptions   Concept of God Accepting   God's Role in Disease Natural   Relationship with God Close   Support Systems [facility] community   Stress Factors   Patient Stress Factors Other (Comment)   Coping Responses   Patient Coping Open/discussion; Accepting   Plan of Care   Comments Cultivated a relationship of care and support  Provided chaplaincy education and prayer  Listened empathetically     Assessment Completed by: Unit visit

## 2019-01-21 NOTE — PROGRESS NOTES
Internal Medicine Progress Note  Patient: Elizabeth Johnston  Age/sex: 80 y o  female  Medical Record #: [id number]      ASSESSMENT/PLAN:  Elizabeth Johnston is seen and examined and management for following issues:    Type III odontoid fracture with fracture line extending to the right/left superior articular facets and left transverse foramen of C2; nondisplaced fracture through bulky, bridging osteophytes at the C6 level:  required no surgery  Continue collar  Pt reported severe Rt facial pain on 1/19 and an afternoon dose of gabapentin was added  Facial pain resolved today      HTN: stable on Norvasc 10mg qd, Cozaar 100mg qd, Lopressor 25mg q12hrs  Hyponatremia:  Sodium today is lowest that she has been = will get serum/urine osmol, urine sodium/chloride, uric acid, TSH, AM cortisol  Will change diet from 2GM to 4GM sodium and place on FR 1500ml     CKD; baseline 1 1-1 3:  Stable     Hx bioAVR 2012: had no significant disease when had card cath before surgery     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 2016:  continue Plavix that she has been on since CVA 2005; with her TIA 2016, neuro kept Plavix; she is intol statins     DM diet controlled:   watch fasting blood sugars      Chronic diastolic CHF, LVEF 72%, mild-mod MR/mild TR and norm function AVR 2016:  continue Lasix as at home  Follows with Dr Pura You  Family says LE edema is less than at home     RA/SLE:  continue Plaquenil     HLD: intol to statin; continue Red yeast rice and Fish oil when she goes home     Hypothyroidism:  continue current Levothyroxine     Hx right RA stent: BPs since stent controlled     Depression: Zoloft     Chronic pain/fibromyalgia:  on Hydrocodone at home     Hx breast cancer: follows with Alyssa Rodas      Subjective:  denies any complaints      ROS:   GI: denies abdominal pain, change bowel habits or reflux symptoms  Neuro: +Right facial pain  Respiratory: No Cough, SOB  Cardiovascular: No CP, palpitations     Scheduled Meds:    Current Facility-Administered Medications:  acetaminophen 975 mg Oral [facility] Fartun Cassidy MD   albuterol 2 puff Inhalation Q4H PRN Fartun Cassidy, MD   amLODIPine 10 mg Oral Daily Fartun Cassidy MD   calcium carbonate-vitamin D 1 tablet Oral BID With Meals Fartun Cassidy MD   clopidogrel 75 mg Oral Daily Fartun Cassidy MD   docusate sodium 100 mg Oral BID Fartun Cassidy MD   enoxaparin 40 mg Subcutaneous Q24H [address] Fartun Cassidy, MD   fish oil 1,000 mg Oral Daily Fartun Cassidy MD   furosemide 20 mg Oral Daily Fartun Cassidy, MD   gabapentin 300 mg Oral BID WILLAM AriasC   gabapentin 600 mg Oral HS Fartun Cassidy, MD   hydroxychloroquine 200 mg Oral Daily With Breakfast Fartun Cassidy MD   levothyroxine 112 mcg Oral Early Morning Fartun Cassidy MD   lidocaine 1 patch Topical Daily Fartun Cassidy, MD   lidocaine 1 patch Topical Daily Fartun Cassidy, MD   losartan 100 mg Oral Daily Fartun Cassidy, MD   methocarbamol 500 mg Oral Q6H PRN Fartun Cassidy MD   metoprolol tartrate 25 mg Oral Q12H [address] Fartun Cassidy MD   nystatin  Topical BID Fartun Cassidy, MD   nystatin  Topical BID Fartun Cassidy, MD   oxyCODONE 2 5 mg Oral Q4H PRN Fartun Cassidy, MD   oxyCODONE 2 5 mg Oral Q4H PRN Fartun Cassidy, MD   polyethylene glycol 17 g Oral Daily PRN Fartun Cassidy, MD   senna 1 tablet Oral HS Fartun Cassidy, MD   sertraline 25 mg Oral Daily Fartun Cassidy, MD   trolamine salicylate 1 application Topical 4x Daily PRN Ewelina Perales, PA-C       Labs:       Results from last 7 days  Lab Units 01/21/19  1004 01/18/19  0550   WBC Thousand/uL 6 69 6 95   HEMOGLOBIN g/dL 12 1 10 9*   HEMATOCRIT % 36 7 34 0*   PLATELETS Thousands/uL 205 182       Results from last 7 days  Lab Units 01/21/19  1004 01/18/19  0550   SODIUM mmol/L 130* 134*   POTASSIUM mmol/L 4 1 3 7   CHLORIDE mmol/L 98* 98*   CO2 mmol/L 27 29   BUN mg/dL 30* 31*   CREATININE mg/dL 0 99 1 14   CALCIUM mg/dL 9 1 9 1       Results from last 7 days  Lab Units 01/18/19  0550 HEMOGLOBIN A1C % 5 2              Results from last 7 days  Lab Units 01/18/19  1526 01/18/19  1118 01/18/19  0648   POC GLUCOSE mg/dl 124 115 97     [unfilled]    Labs reviewed    Physical Examination:  Vitals:   Vitals:    01/20/19 1011 01/20/19 1354 01/20/19 2105 01/21/19 0558   BP:  122/66 130/70 126/69   BP Location:  Left arm Left arm Left arm   Pulse: (!) 53 (!) 54 60 58   Resp:  18 20 20   Temp:  97 7 °F (36 5 °C) 98 5 °F (36 9 °C) 97 6 °F (36 4 °C)   TempSrc:  Oral Oral Oral   SpO2:  94% 95% 98%   Weight:       Height:           HEENT: PERRLA EOMI;  No thrush  RESP: CTAB, no R/R/W; resp unlabored  CV: +S1 S2, regular rate, no rubs/murmurs  ABD: soft, NT, ND, normal BS   EXT: No edema  Neuro: AAOx3  [ X ] Total time spent: 30 Mins and greater than 50% of this time was spent counseling/coordinating care  ** Please Note: Dragon 360 Dictation voice to text software may have been used in the creation of this document   **

## 2019-01-21 NOTE — CONSULTS
Consultation - Neuropsychology    Mojgan Solano 80 y o  female MRN: [id number]  Unit/Bed#: -01 Encounter: [id number]    Assessment/Plan     Assessment:  Pt has history of depression; pt denied current major symptoms  Pt reported frustration and sadness related to the need for hospitalization and being away from her family  Other psychosocial stressors include: fracture from fall at home; loss of independence  Overall, pt is currently coping with medical issues and adjusting to rehab needs  Family and social support includes: pt lives with her daughter, son in law and granddaughters, she has close family relationships  Pt reported motivation to participate in rehab program and work on rehab goals  Dx: G25 66 Adjustment disorder with depressed mood  Code: [id number]    Plan:   Pt will be afforded rehab psychology services while at [facility] to help pt cope with illness  History of Present Illness   Physician Requesting Consult: Sebastian Iyer MD  Reason for Consult / Principal Problem: coping, adjustment  Patient is a 80 y o  female   Primary complaints include: concern about health problems and poor concentration  Psychosocial Stressors: health  Consults    Psychiatric Review Of Systems:  sleep: no  appetite changes: no  weight changes: no  energy/anergy: yes  interest/pleasure/anhedonia: no  somatic symptoms: yes  anxiety/panic: no  murali: no  guilty/hopeless: no  self injurious behavior/risky behavior: no    Historical Information   Past Psychiatric History: noted history of depression  Substance Abuse History:  Use of Alcohol: denied and 0 out of 4 on CAGE    Smoking history: none noted  Family Psychiatric History: none noted  Social History  Education: high school diploma/GED  Marital history:   Living arrangement, social support: The patient lives in home with extended family    Occupational History: retired  Functioning Relationships: good support system and good relationship with children  Other Pertinent History: None    Traumatic History:   Abuse: none noted  Other Traumatic Events: fall that lead to current injuries; death of  last year      Past Medical History:   Diagnosis Date    CHF (congestive heart failure) ([facility] [address] )     Hypertension     Stroke [facility])        Medical Review Of Systems:  Review of Systems    Meds/Allergies   current meds:   Current Facility-Administered Medications   Medication Dose Route Frequency    acetaminophen (TYLENOL) tablet 975 mg  975 mg Oral Q8H [facility]    albuterol (PROVENTIL HFA,VENTOLIN HFA) inhaler 2 puff  2 puff Inhalation Q4H PRN    amLODIPine (NORVASC) tablet 10 mg  10 mg Oral Daily    calcium carbonate-vitamin D (OSCAL-D) 500 mg-200 units per tablet 1 tablet  1 tablet Oral BID With Meals    clopidogrel (PLAVIX) tablet 75 mg  75 mg Oral Daily    docusate sodium (COLACE) capsule 100 mg  100 mg Oral BID    enoxaparin (LOVENOX) subcutaneous injection 40 mg  40 mg Subcutaneous Q24H Pending sale to [facility]    fish oil capsule 1,000 mg  1,000 mg Oral Daily    furosemide (LASIX) tablet 20 mg  20 mg Oral Daily    gabapentin (NEURONTIN) capsule 300 mg  300 mg Oral BID    gabapentin (NEURONTIN) capsule 600 mg  600 mg Oral HS    hydroxychloroquine (PLAQUENIL) tablet 200 mg  200 mg Oral Daily With Breakfast    levothyroxine tablet 112 mcg  112 mcg Oral Early Morning    lidocaine (LIDODERM) 5 % patch 1 patch  1 patch Topical Daily    lidocaine (LIDODERM) 5 % patch 1 patch  1 patch Topical Daily    losartan (COZAAR) tablet 100 mg  100 mg Oral Daily    methocarbamol (ROBAXIN) tablet 500 mg  500 mg Oral Q6H PRN    metoprolol tartrate (LOPRESSOR) tablet 25 mg  25 mg Oral Q12H Pending sale to [facility]    nystatin (MYCOSTATIN) cream   Topical BID    nystatin (MYCOSTATIN) powder   Topical BID    oxyCODONE (ROXICODONE) IR tablet 2 5 mg  2 5 mg Oral Q4H PRN    oxyCODONE (ROXICODONE) IR tablet 2 5 mg  2 5 mg Oral Q4H PRN    polyethylene glycol (MIRALAX) packet 17 g  17 g Oral Daily PRN    senna (SENOKOT) tablet 8 6 mg  1 tablet Oral HS    sertraline (ZOLOFT) tablet 25 mg  25 mg Oral Daily    trolamine salicylate (ASPERCREME) 10 % cream 1 application  1 application Topical 4x Daily PRN     Allergies   Allergen Reactions    Duloxetine Hcl      Cymbalta; Hemorrhagic stroke listed as reaction    Anastrozole     Exemestane      Aromasin; Muscle pain & cramps    Lexapro [Escitalopram]      Urinary retention    Lyrica [Pregabalin]      hypertension    Metformin      diarrhea    Statins      Muscle pain & cramps    Tramadol      hypertension    Triprolidine-Pseudoephedrine        Objective   Vital signs in last 24 hours:  Temp:  [97 6 °F (36 4 °C)-98 5 °F (36 9 °C)] 97 6 °F (36 4 °C)  HR:  [54-60] 58  Resp:  [18-20] 20  BP: (122-130)/(66-70) 126/69      Intake/Output Summary (Last 24 hours) at 01/21/19 1158  Last data filed at 01/21/19 0830   Gross per 24 hour   Intake              360 ml   Output                0 ml   Net              360 ml       Mental Status Evaluation:  Appearance:  age appropriate   Behavior:  normal   Speech:  normal pitch and normal volume   Mood:  euthymic   Affect:  mood-congruent   Language:  WNL   Thought Process:  goal directed and logical   Thought Content:  normal   Perceptual Disturbances: None   Risk Potential: none   Sensorium:  person, place and situation   Cognition:  grossly intact   Consciousness:  alert and awake    Attention: attention span and concentration were age appropriate   Intellect: within normal limits   Fund of Knowledge: vocabulary: WNL   Insight:  age appropriate   Judgment: age appropriate   Muscle Strength and Tone: see PT eval   Gait/Station: see PT eval   Motor Activity: no abnormal movements

## 2019-01-21 NOTE — PROGRESS NOTES
Physical Medicine and Rehabilitation Progress Note  Cliff Hutchison 80 y o  female MRN: [id number]  Unit/Bed#: -01 Encounter: [id number]    HPI: Juanita Bulgarian a 80 y  o  female who presented to the [facility] as a trauma following a fall on Plavix  She utilizes a SPC at baseline, and had hit a wet patch on the floor causing the cane to slip from under her  Her PMH is significant for HTN with history of CVA, AVR, HTN, and T2DM  CT C-Spine revealed a Type 3 Odontoid fracture extending to the R and L superior articular facets and L transverse foramen  She also had an acute non-displaced C6 fracture  Treated non-op  Course c/b by fluid overload, hospital delirium, and  Pain  These are improving  She was evaluated by PT/OT and determined to be appropriate for discharge to the [facility] on 1/17/19  Chief Complaint: Neck Pain    Interval: Patient s/e at bedside  States she had a heavenly sleep  She denies any CP, SOB, fevers, chills, N/V  Last BM 1/16  She reports she used to see a pain management physician in the early 80s who taught her biofeedback and did aquatherapy which was incredibly helpful for her diffuse pain  She would like to get back to trying modalities like that to help control her pain  No acute events overnight  ROS:  Negative except for what is noted in HPI/CC/Sbuj    Assessment/Plan:      * Ambulatory dysfunction   Assessment & Plan    Patient will participate in a comprehensive inpatient rehab program with 3-5 hours a day 5-7 days a week of PT/OT  To evaluate for any further SLP needs  - Continue pain management  - Fall precautions     C6 cervical fracture (HCC)   Assessment & Plan    Non-op management  - Pain control as noted above in Type III odontoid fracture  - PT/OT     Type III fracture of odontoid process [facility])   Assessment & Plan    Please see ambulatory dysfunction     C-Collar ATC   - Pain management - goal to wean off of opioids   - Continue Oxycodone 2 5mg as needed  - Discontinued breakthrough  - Continue Tylenol ATC  - Continue Robaxin as needed for muscle spasms  - Lidocaine patch  - Cervical precautions  - Will need follow-up with Neurosurgery on 1/28/19  Hyponatremia   Assessment & Plan    1/21 Na down to 130  Will discuss with medicine, may be SIADH vs  PO intake  Monitor labs  History of aortic valve replacement with bioprosthetic valve   Assessment & Plan    Monitor cardiopulmonary status  IM to manage     Fibromyalgia   Assessment & Plan    Continue sertraline  Continue gabapentin (Renally dosed)  Consult neuropsychology for adjustment and coping skills for pain management  Rheumatoid arthritis involving multiple sites [facility])   Assessment & Plan    Continue plaquenil  - Was also on hydrocodone but titrating off at home  - Monitor for flare  - IM consulted     Chronic kidney disease   Assessment & Plan    Crea stable  Depression   Assessment & Plan    Continue sertraline  Would avoid lorazepam considering confusion  Has not received on inpatient  Type 2 diabetes mellitus with diabetic polyneuropathy [facility])   Assessment & Plan    Lab Results   Component Value Date    HGBA1C 5 2 01/18/2019       Recent Labs      01/18/19   1526   POCGLU  124       Blood Sugar Average: Last 72 hrs:  - Checking Hgb A1C  - Starting on CDI  - Starting Accuchecks  - At home diet controlled  - Placed on diabetic diet  - IM to manage     History of CVA (cerebrovascular accident)   Assessment & Plan    No tim sequelae  Cannot tolerate statins  Continue Plavix  Continue fish oil  At home on other supplements as well  Chronic diastolic heart failure (HCC)   Assessment & Plan    With some volume overload early in hospital stay     Now examines euvolemic  Monitor kidney function on lasix   Continue ARB/BB     Renovascular hypertension   Assessment & Plan    Continue increased Amlodipine 10mg  Continues home Losartan 100mg  Continue metoprolol tartrate 25mg Q12  Change hydralazine PRN to PO  S/p stenting for DONAVAN  IM to manage       # Skin  · Encourage regular turning as patient at risk for skin breakdown  · Staff to continue patient education on Q2h turning     # Bowel  · Patient reports no constipation  · last BM 1/21/19  Continu regimen of Colace, senna, and miralax       # Bladder  · Patient voiding spontaneously    # Pain  · Continue tylenol, for max of 3gm daily  · Continue oxycodone   · Continue methocarbamol  · Continue gabapentin    # Rehab Psych   · referral to Rehabilitation Psychology    # Other  - Diet/Nutrition:        Diet Orders            Start     Ordered    01/18/19 1606  Diet Cardiovascular; Sodium 2 GM  Diet effective now     Question Answer Comment   Diet Type Cardiovascular    Cardiac Sodium 2 GM    RD to adjust diet per protocol?  Yes        01/18/19 1606    01/18/19 0721  Room Service  Once     Question:  Type of Service  Answer:  Room Service - Appropriate with Assistance    01/18/19 0720    01/17/19 1654  Dietary nutrition supplements  Once     Question Answer Comment   Select Supplement: Ensure Enlive-Vanilla    Frequency Lunch        01/17/19 1653        - DVT prophy: Sequential compression device (Venodyne)  and Enoxaparin (Lovenox)  - GI ppx: None  - Nausea: None  - Supplements: None  - Sleep: None    Disposition: TBD    CODE: Level 1: Full Code   Scheduled Meds:    Current Facility-Administered Medications:  acetaminophen 975 mg Oral Q8H [address] Maryjane Gurrola MD   albuterol 2 puff Inhalation Q4H PRN Maryjane Gurrola MD   amLODIPine 10 mg Oral Daily Maryjane Gurrola MD   calcium carbonate-vitamin D 1 tablet Oral BID With Meals Maryjane Gurrola MD   clopidogrel 75 mg Oral Daily Maryjane Gurrola MD   docusate sodium 100 mg Oral BID Maryjane Gurrola MD   enoxaparin 40 mg Subcutaneous Q24H [address] Maryjane Gurrola MD   fish oil 1,000 mg Oral Daily Maryjane Gurrola MD   furosemide 20 mg Oral Daily Maryjane Gurrola MD   gabapentin 300 mg Oral BID Ewelina Perales PA-C   gabapentin 600 mg Oral HS Hammad Og MD   hydroxychloroquine 200 mg Oral Daily With Breakfast Hammad Og MD   levothyroxine 112 mcg Oral Early Morning Hammad Og MD   lidocaine 1 patch Topical Daily Hammad Og MD   lidocaine 1 patch Topical Daily Hammad Og MD   losartan 100 mg Oral Daily Hammad Og MD   methocarbamol 500 mg Oral Q6H PRN Hammad Og MD   metoprolol tartrate 25 mg Oral Q12H [place] Goel MD   nystatin  Topical BID Hammad Og MD   nystatin  Topical BID Hammad Og MD   oxyCODONE 2 5 mg Oral Q4H PRN Hammad Og MD   polyethylene glycol 17 g Oral Daily PRN Hammad Og MD   senna 1 tablet Oral HS Hammad Og MD   sertraline 25 mg Oral Daily Hammad Og MD   trolamine salicylate 1 application Topical 4x Daily PRN Ewelina Perales PA-C        Objective:    Functional Update:   1/20/19 OT: Set-up for eating, min- Roseline Field for functional transfers  1/21/19 PT: min-modA for transfers, ClS for ambulation 20' x2, 48' x2 with RW, Deondre for toilet transfers, hygiene, and lower body dressing  Allergies per EMR    Physical Exam:  Temp:  [97 6 °F (36 4 °C)-98 5 °F (36 9 °C)] 97 6 °F (36 4 °C)  HR:  [54-60] 58  Resp:  [18-20] 20  BP: (122-130)/(66-70) 126/69  SpO2:  [94 %-98 %] 98 %    General: alert, no apparent distress, cooperative and comfortable  HEENT:  Head: Normocephalic, no lesions, without obvious abnormality  Neck has C-collar in place  No signs of skin breakdown at occiput/chin  CARDIAC:  regular rate and rhythm, S1, S2 normal, no murmur, click, rub or gallop  LUNGS:  normal air entry, lungs clear to auscultation  ABDOMEN:  soft, non-tender  Bowel sounds normal  No masses, no organomegaly  EXTREMITIES:  No pitting edema noted at this time  NEURO:   normal without focal findings, mental status, speech normal, alert and oriented x3, SANDEEP and Moving all 4 extremities symmetrically   Observed ambulating - decreased step length and clearance, almost shuffling  PSYCH:  Normal  and Alert and oriented, appropriate affect  INCISION:  None    Diagnostic Studies: Reviewed, no new imaging  No orders to display       Laboratory: Reviewed    Results from last 7 days  Lab Units 01/21/19  1004 01/18/19  0550   HEMOGLOBIN g/dL 12 1 10 9*   HEMATOCRIT % 36 7 34 0*   WBC Thousand/uL 6 69 6 95       Results from last 7 days  Lab Units 01/21/19  1004 01/18/19  0550   BUN mg/dL 30* 31*   SODIUM mmol/L 130* 134*   POTASSIUM mmol/L 4 1 3 7   CHLORIDE mmol/L 98* 98*   CREATININE mg/dL 0 99 1 14   AST U/L  --  35   ALT U/L  --  43            Patient Active Problem List   Diagnosis    Closed nondisplaced fracture of second cervical vertebra (HCC)    Neck pain, acute    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)    C6 cervical fracture (HCC)    Hypertension    Hypothyroidism    Fall    Forgetfulness    Ambulatory dysfunction    Physical deconditioning    Acute pain    Delirium    Renovascular hypertension    Chronic diastolic heart failure (HCC)    History of CVA (cerebrovascular accident)    Type 2 diabetes mellitus with diabetic polyneuropathy (HCC)    Depression    Chronic kidney disease    Rheumatoid arthritis involving multiple sites ([facility] [address] )    Fibromyalgia    History of aortic valve replacement with bioprosthetic valve       ** Please Note: Fluency Direct voice to text software may have been used in the creation of this document  **    Total visit time:  At least 25 minutes, with more than 50% spent counseling/coordinating care

## 2019-01-21 NOTE — PROGRESS NOTES
01/21/19 1230   Pain Assessment   Pain Assessment 0-10   Pain Score 5   Pain Type Chronic pain   Pain Location Head;Neck   Pain Radiating Towards b/l shoulders   Pain Descriptors Aching; Sharp   Pain Frequency Constant/continuous   Pain Onset Ongoing   Clinical Progression Gradually improving   Hospital Pain Intervention(s) Ambulation/increased activity   Response to Interventions pt tolerated gentle ROM    Restrictions/Precautions   Precautions Fall Risk;Spinal precautions   ROM Restrictions Yes  (cerv spinal precautions)   Braces or Orthoses C/S Collar   QI: Sit to Stand   Assistance Needed Physical assistance   Assistance Provided by Cyrus 25%-49%   Sit to Stand CARE Score 3   QI: Chair/Bed-to-Chair Transfer   Assistance Needed Physical assistance   Assistance Provided by Cyrus 25%-49%   Chair/Bed-to-Chair Transfer CARE Score 3   Transfer Bed/Chair/Wheelchair   Positioning Concerns (pain)   Limitations Noted In Balance; Endurance;LE Strength;UE Strength   Adaptive Equipment Roller Walker   Findings Pt fluctuates with transfers dependent on fatigue, pain, and surface height  Pt able to stand with CGA from armed chair in OT gym, modA from recliner after ext sit  Bed, Chair, Wheelchair Transfer (FIM) 3 - Cyrus needs to lift, boost or assist to stand OR sit   Exercise Tools   Exercise Tools Yes   Theraputty Pt engaged in bead retrievel from yellow theraputty to inc [address] and strength to inc indep with feeding, grooming, toileting tasks  Pt reports that she has neuropathy at baseline and occasionally had difficulty grasping pills prior to fall/injury  Pt req ext time to manipulate putty but reported no pain and had no note bead dropping during session   Other Exercise Tool 1 Pt engaged in towel glides to inc UEROM and dec discomfort in shoulders and back to promote inc dynamic reach for functional task performance  Pt initially reports pain and stiffness but feels "loosening" with repitition      Cognition Overall Cognitive Status Impaired   Arousal/Participation Cooperative   Attention Attends with cues to redirect   Orientation Level Oriented X4   Memory Decreased recall of precautions   Following Commands Follows one step commands with increased time or repetition   Activity Tolerance   Activity Tolerance Patient limited by fatigue;Patient limited by pain   Medical Staff Made Aware NSG notified   Assessment   Treatment Assessment Pt participated in skilled OT session focusing on functional transfers, UE ROM, [address], and dynamic reach  Pt cont to be limited by pain and fatigue in neck and shoulders but motivated to participate this session  Pt req positioning in chairs with support under b/l UEs to limit "pulling" on shoulders  Pt engaged in dynamic reach task to organize photos on photo display "tree " Pt req to use LUE to stabilize each branch while attaching photo with RUE to tree  Pt req rest breaks between each picture due to unsupported position  Pt reported no inc pain  Pt would benefit from cont therapy focusing on act tolerance, UE ROM, UE strength, FMC, and dynamic reach  Pt would also benefit from [address] [place] [address] to determine functional cognition  Cont with POC  Prognosis Fair   Problem List Decreased strength;Decreased range of motion;Decreased endurance; Impaired balance;Decreased mobility; Decreased coordination;Decreased safety awareness;Orthopedic restrictions;Pain; Impaired sensation   Barriers to Discharge Inaccessible home environment;Decreased caregiver support   Plan   Treatment/Interventions Functional transfer training; Therapeutic exercise; Endurance training;Bed mobility; ADL retraining   Progress Progressing toward goals   OT Therapy Minutes   OT Time In 1230   OT Time Out 1400   OT Total Time (minutes) 90   OT Mode of treatment - Individual (minutes) 90   OT Mode of treatment - Concurrent (minutes) 0   OT Mode of treatment - Group (minutes) 0   OT Mode of treatment - Co-treat (minutes) 0   OT Mode of Teatment - Total time(minutes) 90 minutes   Therapy Time missed   Time missed?  No

## 2019-01-22 LAB
ANION GAP SERPL CALCULATED.3IONS-SCNC: 9 MMOL/L (ref 4–13)
BUN SERPL-MCNC: 34 MG/DL (ref 5–25)
CALCIUM SERPL-MCNC: 8.9 MG/DL (ref 8.3–10.1)
CHLORIDE SERPL-SCNC: 98 MMOL/L (ref 100–108)
CO2 SERPL-SCNC: 26 MMOL/L (ref 21–32)
CORTIS SERPL-MCNC: 17.8 UG/DL
CREAT SERPL-MCNC: 1.07 MG/DL (ref 0.6–1.3)
GFR SERPL CREATININE-BSD FRML MDRD: 48 ML/MIN/1.73SQ M
GLUCOSE SERPL-MCNC: 94 MG/DL (ref 65–140)
POTASSIUM SERPL-SCNC: 4.5 MMOL/L (ref 3.5–5.3)
SODIUM SERPL-SCNC: 133 MMOL/L (ref 136–145)

## 2019-01-22 PROCEDURE — 80048 BASIC METABOLIC PNL TOTAL CA: CPT | Performed by: NURSE PRACTITIONER

## 2019-01-22 PROCEDURE — 97116 GAIT TRAINING THERAPY: CPT

## 2019-01-22 PROCEDURE — 99232 SBSQ HOSP IP/OBS MODERATE 35: CPT | Performed by: PHYSICAL MEDICINE & REHABILITATION

## 2019-01-22 PROCEDURE — 82533 TOTAL CORTISOL: CPT | Performed by: NURSE PRACTITIONER

## 2019-01-22 PROCEDURE — 97530 THERAPEUTIC ACTIVITIES: CPT

## 2019-01-22 PROCEDURE — 97535 SELF CARE MNGMENT TRAINING: CPT

## 2019-01-22 PROCEDURE — 97110 THERAPEUTIC EXERCISES: CPT

## 2019-01-22 RX ADMIN — SERTRALINE HYDROCHLORIDE 25 MG: 25 TABLET ORAL at 21:19

## 2019-01-22 RX ADMIN — TROLAMINE SALICYLATE 1 APPLICATION: 10 CREAM TOPICAL at 22:57

## 2019-01-22 RX ADMIN — NYSTATIN: 100000 POWDER TOPICAL at 17:36

## 2019-01-22 RX ADMIN — CALCIUM CARBONATE 500 MG (1,250 MG)-VITAMIN D3 200 UNIT TABLET 1 TABLET: at 07:41

## 2019-01-22 RX ADMIN — NYSTATIN: 100000 CREAM TOPICAL at 08:31

## 2019-01-22 RX ADMIN — METOPROLOL TARTRATE 25 MG: 25 TABLET, FILM COATED ORAL at 21:18

## 2019-01-22 RX ADMIN — TROLAMINE SALICYLATE 1 APPLICATION: 10 CREAM TOPICAL at 13:20

## 2019-01-22 RX ADMIN — GABAPENTIN 300 MG: 300 CAPSULE ORAL at 14:48

## 2019-01-22 RX ADMIN — METOPROLOL TARTRATE 25 MG: 25 TABLET, FILM COATED ORAL at 08:26

## 2019-01-22 RX ADMIN — ACETAMINOPHEN 975 MG: 325 TABLET, FILM COATED ORAL at 05:27

## 2019-01-22 RX ADMIN — LEVOTHYROXINE SODIUM 112 MCG: 112 TABLET ORAL at 05:27

## 2019-01-22 RX ADMIN — CALCIUM CARBONATE 500 MG (1,250 MG)-VITAMIN D3 200 UNIT TABLET 1 TABLET: at 16:48

## 2019-01-22 RX ADMIN — CLOPIDOGREL BISULFATE 75 MG: 75 TABLET ORAL at 08:26

## 2019-01-22 RX ADMIN — LOSARTAN POTASSIUM 100 MG: 50 TABLET, FILM COATED ORAL at 08:26

## 2019-01-22 RX ADMIN — METHOCARBAMOL 500 MG: 500 TABLET, FILM COATED ORAL at 14:48

## 2019-01-22 RX ADMIN — GABAPENTIN 600 MG: 300 CAPSULE ORAL at 21:19

## 2019-01-22 RX ADMIN — OXYCODONE HYDROCHLORIDE 2.5 MG: 5 TABLET ORAL at 03:36

## 2019-01-22 RX ADMIN — ENOXAPARIN SODIUM 40 MG: 40 INJECTION SUBCUTANEOUS at 08:24

## 2019-01-22 RX ADMIN — LIDOCAINE 1 PATCH: 50 PATCH CUTANEOUS at 08:29

## 2019-01-22 RX ADMIN — DOCUSATE SODIUM 100 MG: 100 CAPSULE, LIQUID FILLED ORAL at 17:36

## 2019-01-22 RX ADMIN — HYDROXYCHLOROQUINE SULFATE 200 MG: 200 TABLET, FILM COATED ORAL at 08:28

## 2019-01-22 RX ADMIN — AMLODIPINE BESYLATE 10 MG: 10 TABLET ORAL at 08:27

## 2019-01-22 RX ADMIN — OXYCODONE HYDROCHLORIDE 2.5 MG: 5 TABLET ORAL at 12:40

## 2019-01-22 RX ADMIN — OXYCODONE HYDROCHLORIDE 2.5 MG: 5 TABLET ORAL at 16:48

## 2019-01-22 RX ADMIN — FUROSEMIDE 20 MG: 20 TABLET ORAL at 08:27

## 2019-01-22 RX ADMIN — STANDARDIZED SENNA CONCENTRATE 8.6 MG: 8.6 TABLET ORAL at 21:19

## 2019-01-22 RX ADMIN — ACETAMINOPHEN 975 MG: 325 TABLET, FILM COATED ORAL at 13:19

## 2019-01-22 RX ADMIN — ACETAMINOPHEN 975 MG: 325 TABLET, FILM COATED ORAL at 21:19

## 2019-01-22 RX ADMIN — OXYCODONE HYDROCHLORIDE 2.5 MG: 5 TABLET ORAL at 21:18

## 2019-01-22 RX ADMIN — METHOCARBAMOL 500 MG: 500 TABLET, FILM COATED ORAL at 21:18

## 2019-01-22 RX ADMIN — OXYCODONE HYDROCHLORIDE 2.5 MG: 5 TABLET ORAL at 07:39

## 2019-01-22 RX ADMIN — METHOCARBAMOL 500 MG: 500 TABLET, FILM COATED ORAL at 07:39

## 2019-01-22 RX ADMIN — GABAPENTIN 300 MG: 300 CAPSULE ORAL at 08:27

## 2019-01-22 RX ADMIN — NYSTATIN: 100000 POWDER TOPICAL at 08:32

## 2019-01-22 RX ADMIN — DOCUSATE SODIUM 100 MG: 100 CAPSULE, LIQUID FILLED ORAL at 08:27

## 2019-01-22 NOTE — PCC OCCUPATIONAL THERAPY
Pt has made G progress towards achieving LTGs  Pt continues to present with decreased activity tolerance/endurance, dec UE strength/ROM, decreased memory and pain  Pt with supportive daughter who has been present for family training on C collar management and on ADL recommendations  Pt has achieved supervision level goals  Recommending pt be dc'd home with family support and home OT services

## 2019-01-22 NOTE — PROGRESS NOTES
Internal Medicine Progress Note  Patient: Benjamin Marin  Age/sex: 80 y o  female  Medical Record #: [id number]      ASSESSMENT/PLAN:  Benjamin Marin is seen and examined and management for following issues:    Type III odontoid fracture with fracture line extending to the right/left superior articular facets and left transverse foramen of C2; nondisplaced fracture through bulky, bridging osteophytes at the C6 level:  required no surgery  Continue collar  Pt reported severe Rt facial pain on 1/19 and an afternoon dose of gabapentin was added  Facial pain resolved today      HTN: stable on Norvasc 10mg qd, Cozaar 100mg qd, Lopressor 25mg q12hrs  Hyponatremia:  Improved; continue the diet change from 2GM to 4GM sodium/FR 1500ml  Will recheck sodium 1/24      CKD; baseline 1 1-1 3:  Stable     Hx bioAVR 2012: had no significant disease when had card cath before surgery     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 2016:  continue Plavix that she has been on since CVA 2005; with her TIA 2016, neuro kept Plavix; she is intol statins     DM diet controlled:   watch fasting blood sugars      Chronic diastolic CHF, LVEF 24%, mild-mod MR/mild TR and norm function AVR 2016:  continue Lasix as at home  Follows with Dr Kirstin Diaz  Family says LE edema is less than at home     RA/SLE:  continue Plaquenil     HLD: intol to statin; continue Red yeast rice and Fish oil when she goes home     Hypothyroidism:  continue current Levothyroxine     Hx right RA stent: BPs since stent placement is controlled     Depression: Zoloft     Chronic pain/fibromyalgia:  on Hydrocodone at home     Hx breast cancer: follows with Bernestine Guardian      Subjective:  denies any complaints      ROS:   GI: denies abdominal pain, change bowel habits or reflux symptoms  Neuro: +Right facial pain  Respiratory: No Cough, SOB  Cardiovascular: No CP, palpitations     Scheduled Meds:    Current Facility-Administered Medications:  acetaminophen 975 mg Oral [facility] Guillaume Benavides MD   albuterol 2 puff Inhalation Q4H PRN Rogelio Espinoza MD   amLODIPine 10 mg Oral Daily Rogelio Espinoza MD   calcium carbonate-vitamin D 1 tablet Oral BID With Meals Rogelio Espinoza MD   clopidogrel 75 mg Oral Daily Rogelio Espinoza MD   docusate sodium 100 mg Oral BID Rogelio Espinoza MD   enoxaparin 40 mg Subcutaneous Q24H [facility] Rogelio Espinoza MD   fish oil 1,000 mg Oral Daily Rogelio Espinoza MD   furosemide 20 mg Oral Daily Rogleio Espinoza MD   gabapentin 300 mg Oral BID Ewelina Perales PA-C   gabapentin 600 mg Oral HS Rogelio Espinoza MD   hydroxychloroquine 200 mg Oral Daily With Breakfast Rogelio Espinoza MD   levothyroxine 112 mcg Oral Early Morning Rogelio Espinoza MD   lidocaine 1 patch Topical Daily Rogelio Espinoza MD   lidocaine 1 patch Topical Daily Rogelio Espinoza MD   losartan 100 mg Oral Daily Rogelio Espinoza MD   methocarbamol 500 mg Oral Q6H PRN Rogelio Espinoza MD   metoprolol tartrate 25 mg Oral Q12H [facility] Rogelio Espinoza MD   nystatin  Topical BID Rogelio Espinoza MD   nystatin  Topical BID Rogelio Espinoza MD   oxyCODONE 2 5 mg Oral Q4H PRN Rogelio Espinoza MD   polyethylene glycol 17 g Oral Daily PRN Rogelio Espinoza MD   senna 1 tablet Oral HS Rogelio Espinoza MD   sertraline 25 mg Oral Daily Rogelio Espinoza MD   trolamine salicylate 1 application Topical 4x Daily PRN Ewelina Perales, PA-LYNNE       Labs:       Results from last 7 days  Lab Units 01/21/19  1004 01/18/19  0550   WBC Thousand/uL 6 69 6 95   HEMOGLOBIN g/dL 12 1 10 9*   HEMATOCRIT % 36 7 34 0*   PLATELETS Thousands/uL 205 182       Results from last 7 days  Lab Units 01/22/19  0527 01/21/19  1004   SODIUM mmol/L 133* 130*   POTASSIUM mmol/L 4 5 4 1   CHLORIDE mmol/L 98* 98*   CO2 mmol/L 26 27   BUN mg/dL 34* 30*   CREATININE mg/dL 1 07 0 99   CALCIUM mg/dL 8 9 9 1       Results from last 7 days  Lab Units 01/18/19  0550   HEMOGLOBIN A1C % 5 2              Results from last 7 days  Lab Units 01/18/19  1526 01/18/19  1118 01/18/19  0648   POC GLUCOSE mg/dl 124 115 97     [unfilled]    Labs reviewed    Physical Examination:  Vitals:   Vitals:    01/21/19 0558 01/21/19 1358 01/21/19 2006 01/22/19 0501   BP: 126/69 118/60 151/65 138/58   BP Location: Left arm Left arm Left arm Right arm   Pulse: 58 104 55 57   Resp: 20 20 18 18   Temp: 97 6 °F (36 4 °C) 98 °F (36 7 °C) 98 4 °F (36 9 °C) 97 8 °F (36 6 °C)   TempSrc: Oral Oral Oral Oral   SpO2: 98% 94% 98% 99%   Weight:       Height:           HEENT:  No thrush  RESP: CTAB, no R/R/W; resp unlabored  CV: +S1 S2, regular rate, no rubs/murmurs  ABD: soft, NT, ND, normal BS   EXT: No edema  Neuro: AAOx3  [ X ] Total time spent: 30 Mins and greater than 50% of this time was spent counseling/coordinating care  ** Please Note: Dragon 360 Dictation voice to text software may have been used in the creation of this document   **

## 2019-01-22 NOTE — PROGRESS NOTES
01/22/19 1230   Pain Assessment   Pain Assessment 0-10   Pain Score 8   Pain Type Acute pain   Pain Location Neck;Head   Pain Orientation Posterior   Pain Descriptors Throbbing;Aching   Pain Frequency Constant/continuous   Hospital Pain Intervention(s) Rest  (spoke with RN asking for meds during session)   Response to Interventions frequent rest breaks   Restrictions/Precautions   Precautions Fall Risk;Spinal precautions   Braces or Orthoses C/S Collar   Subjective   Subjective c/o pain in neck, between shoulders and raditing into head   QI: Sit to Stand   Assistance Needed Physical assistance;Verbal cues   Assistance Provided by [place] 25%-49%   Sit to Stand CARE Score 3   QI: Chair/Bed-to-Chair Transfer   Assistance Needed Physical assistance; Adaptive equipment   Assistance Provided by [place] 25%-49%   Chair/Bed-to-Chair Transfer CARE Score 3   Transfer Bed/Chair/Wheelchair   Adaptive Equipment Roller Walker   Stand Pivot Contact Guard   Sit to Stand Moderate Assist   Bed, Chair, Wheelchair Transfer (FIM) 3 - [place] needs to lift, boost or assist to stand OR sit   QI: Walk 10 Feet   Assistance Needed Physical assistance; Adaptive equipment   Assistance Provided by [place] Less than 25%   Walk 10 Feet CARE Score 3   QI: Walk 50 Feet with Two Turns   Reason if not Attempted Safety concerns   Walk 50 Feet with Two Turns CARE Score 88   QI: Walk 150 Feet   Reason if not Attempted Safety concerns   Walk 150 Feet CARE Score 88   QI: Walking 10 Feet on Uneven Surfaces   Reason if not Attempted Safety concerns   Walking 10 Feet on Uneven Surfaces CARE Score 88   Ambulation   Does the patient walk? 2  Yes   Primary Discharge Mode of Locomotion Walk   Walk Assist Level Contact Guard   Gait Pattern Slow Jenny;Decreased foot clearance; Step to   Assist Device Lisa Fang Walked (feet) 20 ft  (x6)   Limitations Noted In Endurance;Balance;Strength;Speed   Findings limited by fatigue and c/o neck pain   Walking (FIM) 1 - Patient ambulates less than 49 feet regardless of assist/device/set up   Wheelchair mobility   QI: Does the patient use a wheelchair? 0  No   Toilet Transfer   Toilet Transfer (FIM) 3 - [place] needs to lift, boost or assist to stand OR sit   Therapeutic Interventions   Strengthening LAQ 4 x 15 reps, STS 3 x 5 reps   Flexibility passive stretch B HS and calves   Equipment Use   NuStep 10 min B LE lvl 1   Assessment   Treatment Assessment Pt starting hussein with c/o pain, just receiving oxycodone at start of session  Frequent rest breaks required due to pain and fatigue  Pt given tylenol and robaxin along with aspercream by Rn during session, with reports of pain relief, yet still with intermittent c/o spasms with headaches  Still requires A with STS transfers, relys on B UE for support  Only ambulatig 20-25ft at a time, remains very fearful of falling  will benefit from continued therapy to maxmize her functional Ind and safety  Recommendation   Recommendation Home with family support;Home PT   Equipment Recommended Walker   PT Therapy Minutes   PT Time In 1230   PT Time Out 1400   PT Total Time (minutes) 90   PT Mode of treatment - Individual (minutes) 90   PT Mode of treatment - Concurrent (minutes) 0   PT Mode of treatment - Group (minutes) 0   PT Mode of treatment - Co-treat (minutes) 0   PT Mode of Teatment - Total time(minutes) 90 minutes   Therapy Time missed   Time missed?  No

## 2019-01-22 NOTE — PCC PHYSICAL THERAPY
1/22/19  Pt presents with decline in function  Pt c/o pain ion neck, spasms between shoulders, radiating to head limiting her ability to participate at times  Pt retropulsive with STS transfers, relying on B UE more than legs  CGA/MiN A with ambulation, but short distances only at this time  Slow progress towards LTGs  Will benefit form continued skilled therapy to maximize functional mobility, decrease  [place] of care and risk for falls  1/31/19  Patient making good progress with skilled PT intervention  Pt overall min/CGA for activity  Pt  ambulates in slow cande but is safe with no LOB noted  Pt  cont to have some difficulty with ascending steps due to arthritis on B LE but does not have really to manage steps in her house  Pt   cont to demonstrate progress and inc activity tolerance  COnt with POC as tolerated     02/5/2019    Pt progressing toward goals today with skilled PT intervention  Pt shows good demo/ and safety awareness during ambulation and car transfers with her daughter outside today in PM session  Instructed pt daughter on car transfers outside with safety precaution to dec fall risk and pt daughter demo ,good understanding of hand and body position on pt during car transfers  Pt being D/C with all needs on 2/6

## 2019-01-22 NOTE — PLAN OF CARE
Discharge to home or other facility with appropriate resources Progressing      Absence or prevention of progression during hospitalization Progressing      Absence of fever/infection during neutropenic period Progressing      Nutrient/Hydration intake appropriate for improving, restoring or maintaining nutritional needs Progressing      Verbalizes/displays adequate comfort level or baseline comfort level Progressing      Patient will remain free of falls Progressing      Skin integrity is maintained or improved Progressing      Patient will remain free of falls Progressing      Maintain or return to baseline ADL function Progressing      Maintain or return mobility status to optimal level Progressing

## 2019-01-22 NOTE — PROGRESS NOTES
Physical Medicine and Rehabilitation Progress Note  Dorothy Rajan 80 y o  female MRN: [id number]  Unit/Bed#: -01 Encounter: [id number]    HPI: Prema Son a 80 y  o  female who presented to the [facility] as a trauma following a fall on Plavix  She utilizes a SPC at baseline, and had hit a wet patch on the floor causing the cane to slip from under her  Her PMH is significant for HTN with history of CVA, AVR, HTN, and T2DM  CT C-Spine revealed a Type 3 Odontoid fracture extending to the R and L superior articular facets and L transverse foramen  She also had an acute non-displaced C6 fracture  Treated non-op  Course c/b by fluid overload, hospital delirium, and  Pain  These are improving  She was evaluated by PT/OT and determined to be appropriate for discharge to the [facility] on 1/17/19  Chief Complaint: Feeling well today  Interval: Patient s/e at bedside  States her pain is at a 2/10  Distraction helps  Sleeping in the recliner helps, and she performs her weight shifts appropriately throughout the night  Last BM last night  No new bladder dysfunction  Pain - has been using oxycodone 2 5mg about 4x a day, understands the goal is to titrate down  No other acute events overnight  ROS:  Negative except for what is noted in HPI/CC/Sbuj    Assessment/Plan:      * Ambulatory dysfunction   Assessment & Plan    Patient will participate in a comprehensive inpatient rehab program with 3-5 hours a day 5-7 days a week of PT/OT  To evaluate for any further SLP needs  - Continue pain management  - Fall precautions     C6 cervical fracture (HCC)   Assessment & Plan    Non-op management  - Pain control as noted above in Type III odontoid fracture  - PT/OT     Type III fracture of odontoid process [facility])   Assessment & Plan    Please see ambulatory dysfunction     C-Collar ATC   - Pain management - goal to wean off of opioids   - Continue Oxycodone 2 5mg as needed  - Discontinued breakthrough  - Continue Tylenol ATC  - Continue Robaxin as needed for muscle spasms  - Lidocaine patch  - Cervical precautions  - Will need follow-up with Neurosurgery on 1/28/19  Hyponatremia   Assessment & Plan    1/22 Na improved from 130 to 133 with fluid restriction and liberalizing salt intake with diet  Follow-up labs  History of aortic valve replacement with bioprosthetic valve   Assessment & Plan    Monitor cardiopulmonary status  IM to manage     Fibromyalgia   Assessment & Plan    Continue sertraline  Continue gabapentin (Renally dosed)  Consult neuropsychology for adjustment and coping skills for pain management  Rheumatoid arthritis involving multiple sites [facility])   Assessment & Plan    Continue plaquenil  - Was also on hydrocodone but titrating off at home  - Monitor for flare  - IM consulted     Chronic kidney disease   Assessment & Plan    Crea stable  Depression   Assessment & Plan    Continue sertraline  Would avoid lorazepam considering confusion  Has not received on inpatient  Type 2 diabetes mellitus with diabetic polyneuropathy [facility])   Assessment & Plan    Lab Results   Component Value Date    HGBA1C 5 2 01/18/2019       No results for input(s): POCGLU in the last 72 hours  Blood Sugar Average: Last 72 hrs:  - Checking Hgb A1C  - Starting on CDI  - Starting Accuchecks  - At home diet controlled  - Placed on diabetic diet  - IM to manage     History of CVA (cerebrovascular accident)   Assessment & Plan    No tim sequelae  Cannot tolerate statins  Continue Plavix  Continue fish oil  At home on other supplements as well  Chronic diastolic heart failure (HCC)   Assessment & Plan    With some volume overload early in hospital stay     Now examines euvolemic  Monitor kidney function on lasix   Continue ARB/BB     Renovascular hypertension   Assessment & Plan    Continue increased Amlodipine 10mg  Continues home Losartan 100mg  Continue metoprolol tartrate 25mg Q12  Change hydralazine PRN to PO  S/p stenting for DONAVAN  IM managing, recs appreciated       # Skin  · Encourage regular turning as patient at risk for skin breakdown  · Staff to continue patient education on Q2h turning     # Bowel  · Patient reports no constipation  · last BM 1/21/19  Continu regimen of Colace, senna, and miralax       # Bladder  · Patient voiding spontaneously    # Pain  · Continue tylenol, for max of 3gm daily  · Continue oxycodone   · Continue methocarbamol  · Continue gabapentin    # Rehab Psych   · referral to Rehabilitation Psychology    # Other  - Diet/Nutrition:        Diet Orders            Start     Ordered    01/21/19 1337  Diet Cardiovascular; Sodium 4 Gm (KARMA); Fluid Restriction 1500 ML  Diet effective now     Question Answer Comment   Diet Type Cardiovascular    Cardiac Sodium 4 Gm (KARMA)    Other Restriction(s): Fluid Restriction 1500 ML    RD to adjust diet per protocol?  Yes        01/21/19 1336    01/18/19 0721  Room Service  Once     Question:  Type of Service  Answer:  Room Service - Appropriate with Assistance    01/18/19 0720    01/17/19 1654  Dietary nutrition supplements  Once     Question Answer Comment   Select Supplement: Ensure Enlive-Vanilla    Frequency Lunch        01/17/19 1653        - DVT prophy: Sequential compression device (Venodyne)  and Enoxaparin (Lovenox)  - GI ppx: None  - Nausea: None  - Supplements: None  - Sleep: None    Disposition: TBD    CODE: Level 1: Full Code   Scheduled Meds:    Current Facility-Administered Medications:  acetaminophen 975 mg Oral Q8H Jessica Kurtz MD   albuterol 2 puff Inhalation Q4H PRN Judd Christianson MD   amLODIPine 10 mg Oral Daily Judd Christianson MD   calcium carbonate-vitamin D 1 tablet Oral BID With Meals Judd Christianson MD   clopidogrel 75 mg Oral Daily Judd Christianson MD   docusate sodium 100 mg Oral BID Judd Christianson MD   enoxaparin 40 mg Subcutaneous Q24H [address] Judd Christianson MD fish oil 1,000 mg Oral Daily Emily Addison MD   furosemide 20 mg Oral Daily Emily Addison MD   gabapentin 300 mg Oral BID Ewelina Perales PA-C   gabapentin 600 mg Oral HS Emily Addison MD   hydroxychloroquine 200 mg Oral Daily With Breakfast Emily Addison MD   levothyroxine 112 mcg Oral Early Morning Emily Addison MD   lidocaine 1 patch Topical Daily Emily Addison MD   lidocaine 1 patch Topical Daily Emily Addison MD   losartan 100 mg Oral Daily Emily Addison MD   methocarbamol 500 mg Oral Q6H PRN Emily Addison MD   metoprolol tartrate 25 mg Oral Q12H Yao Hampton MD   nystatin  Topical BID Emily Addison MD   oxyCODONE 2 5 mg Oral Q4H PRN Emily Addison MD   polyethylene glycol 17 g Oral Daily PRN Emily Addison MD   senna 1 tablet Oral HS Emily Addison MD   sertraline 25 mg Oral Daily Emily Addison MD   trolamine salicylate 1 application Topical 4x Daily PRN Ewelina Perales PA-C        Objective:    Functional Update:   1/22/19 OT: Fredrick Miles shower/bathe, tub transfer, maxA LB dressing, totalA UB dressing, modA toileting hygiene/toileting/transfers  1/21/19 PT: min-modA for transfers, ClS for ambulation 20' x2, 48' x2 with RW, Deondre for toilet transfers, hygiene, and lower body dressing  Allergies per EMR    Physical Exam:  Temp:  [97 8 °F (36 6 °C)-98 4 °F (36 9 °C)] 97 8 °F (36 6 °C)  HR:  [] 57  Resp:  [18-20] 18  BP: (118-151)/(58-65) 138/58  SpO2:  [94 %-99 %] 99 %    General: alert, no apparent distress, cooperative and comfortable  HEENT:  Head: Normocephalic, no lesions, without obvious abnormality  Neck has C-collar in place  No signs of skin breakdown at occiput/chin  CARDIAC:  regular rate and rhythm, S1, S2 normal, no murmur, click, rub or gallop  LUNGS:  normal air entry, lungs clear to auscultation  ABDOMEN:  soft, non-tender  Bowel sounds normal  No masses, no organomegaly  EXTREMITIES:  Her socks leave an imprint distal 1/3 of her calf    NEURO: normal without focal findings, mental status, speech normal, alert and oriented x3, SANDEEP and Moving all 4 extremities symmetrically  PSYCH:  Normal  and Alert and oriented, appropriate affect  INCISION:  None    Diagnostic Studies: Reviewed, no new imaging  No orders to display       Laboratory: Reviewed    Results from last 7 days  Lab Units 01/21/19  1004 01/18/19  0550   HEMOGLOBIN g/dL 12 1 10 9*   HEMATOCRIT % 36 7 34 0*   WBC Thousand/uL 6 69 6 95       Results from last 7 days  Lab Units 01/22/19  0527 01/21/19  1004 01/18/19  0550   BUN mg/dL 34* 30* 31*   SODIUM mmol/L 133* 130* 134*   POTASSIUM mmol/L 4 5 4 1 3 7   CHLORIDE mmol/L 98* 98* 98*   CREATININE mg/dL 1 07 0 99 1 14   AST U/L  --   --  35   ALT U/L  --   --  43            Patient Active Problem List   Diagnosis    Closed nondisplaced fracture of second cervical vertebra (HCC)    Neck pain, acute    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)    C6 cervical fracture (HCC)    Hypertension    Hypothyroidism    Fall    Forgetfulness    Ambulatory dysfunction    Physical deconditioning    Acute pain    Delirium    Renovascular hypertension    Chronic diastolic heart failure (HCC)    History of CVA (cerebrovascular accident)    Type 2 diabetes mellitus with diabetic polyneuropathy (HCC)    Depression    Chronic kidney disease    Rheumatoid arthritis involving multiple sites ([facility] [address] )    Fibromyalgia    History of aortic valve replacement with bioprosthetic valve    Hyponatremia       ** Please Note: Fluency Direct voice to text software may have been used in the creation of this document  **    Total visit time:  At least 25 minutes, with more than 50% spent counseling/coordinating care

## 2019-01-22 NOTE — PCC CARE MANAGEMENT
Pt has made good progress and is d/c'ing home today with family and contd hhc services through [facility] for rn pt and ot  Family training completed and they are aware of pts functional ability

## 2019-01-22 NOTE — PROGRESS NOTES
Occupational Therapy Treatment Note       01/22/19 4611   Pain Assessment   Pain Assessment 0-10   Pain Score 5   Pain Location Shoulder   Pain Radiating Towards B/L;   Hospital Pain Intervention(s) Repositioned   Response to Interventions RN provided pain medication    Restrictions/Precautions   Precautions Fall Risk;Spinal precautions;Supervision on toilet/commode   Braces or Orthoses C/S Collar   Lifestyle   Autonomy "I am really tired"    QI: Shower/Bathe Self   Assistance Needed Physical assistance   Assistance Provided by [place] 25%-49%   Shower/Bathe Self CARE Score 3   Bathing   Assessed Bath Style Shower   Anticipated D/C Bath Style Shower   Able to [place] No   Able to [employer] Temperature No   Able to Wash/Rinse/Dry (body part) Left Arm;Right Arm;L Upper Leg;R Upper Leg;Chest;Abdomen   Limitations Noted in Balance; Endurance; Safety;Strength;Timeliness;ROM   Positioning Seated;Standing   Findings  pt will requires assistance for bathing of lower legs/feet while seated and buttock/perineal area while in stance  Pt will benefit from long handled adaptive equipment training    Bathing (FIM) 3 - Patient completes 5/10  6/10 or 7/10 parts   Tub/Shower Transfer   Limitations Noted In Balance; Endurance; Safety;UE Strength;LE Strength   Adaptive Equipment Transfer Bench;Grab Bars   Assessed Shower   Findings pt requires mod assist to compelte stand pivot transfer from w/c using grab bars   Pt reports having walk in shower with grab bars all around at home and built in bench    Shower Transfer (FIM) 3 - [place] needs to lift, boost or assist to stand OR sit   QI: Upper Body Dressing   Assistance Needed Physical assistance   Assistance Provided by [place] Total assistance   Comment pt requires assistance to thread UEs and pull shirt around back, and button in front    Upper Body Dressing CARE Score 1   QI: Lower Body Dressing   Assistance Needed Physical assistance   Assistance Provided by [place] 75% or more Comment pt requires assistance to thread LEs into clothing, steadying assist in stance, and assistance to manage clothing over R hip    Lower Body Dressing CARE Score 2   QI: Putting [address] Needed Physical assistance   Assistance Provided by [place] Total assistance   Putting On/Taking Off Footwear CARE Score 1   Dressing/Undressing Clothing   Remove LB Clothes [address]; Undergarment;Socks; Shoes   Limitations Noted In Balance; Endurance; Safety;Strength;ROM; Timeliness   Positioning Supported Sit   Findings pt will benefit from long handled adaptive equipment training    UB Dressing (FIM) 1 - Patient completes less than 25% of all tasks   LB Dressing (FIM) 2 - Patient completes 25-49% of all tasks   QI: Sit to Stand   Assistance Needed Physical assistance   Assistance Provided by [place] 50%-74%   Comment pt requires boosting assistance    Sit to Stand CARE Score 2   QI: Chair/Bed-to-Chair Transfer   Assistance Needed Physical assistance   Assistance Provided by [place] 25%-49%   Comment RW   Chair/Bed-to-Chair Transfer CARE Score 3   Transfer Bed/Chair/Wheelchair   Limitations Noted In Balance; Endurance;UE Strength;LE Strength   Adaptive Equipment Roller Walker   Sit to Stand Moderate Assist;Assist x 1   Stand to Sit Minimal;Assist x 1   Findings pt requires boosting assistance to sit to stand, able to complete functional mobility within room/bathroom with min assist x1 using RW and increased time    Bed, Chair, Wheelchair Transfer (FIM) 3 - Patient completes 50 - 74% of all tasks   QI: [address] Needed Physical assistance   Assistance Provided by [place] 50%-74%   Toileting Hygiene CARE Score 2   Toileting   Able to [address] down no, up no  Manage Hygiene Bladder   Limitations Noted In Balance; Safety;LE Strength;UE Strength   Adaptive Equipment Grab Bar   Findings pt completes hygiene in stance with steadying assist, requires assistance to fully manage brief on/off hips    Toileting (FIM) 2 - Patient completes 25-49% of all tasks   QI: Toilet Transfer   Assistance Needed Physical assistance   Assistance Provided by [place] 50%-74%   Toilet Transfer CARE Score 2   Toilet Transfer   Surface Assessed Standard Toilet   Transfer Technique Standard   Limitations Noted In Balance; Endurance;UE Strength;LE Strength   Adaptive Equipment Grab Bar  (B/L )   Toilet Transfer (FIM) 3 - [place] needs to lift, boost or assist to stand OR sit   Cognition   Comments Pt engages in appropriate conversation, requires increased time to recall spinal precautions and will benefit from repetitive training  Pt will benefit from administration of [place] Cognitive Assessment during next OT tx session    Activity Tolerance   Activity Tolerance Patient tolerated treatment well   Assessment   Treatment Assessment Pt participated in skilled OT tx session focused on ADL routine  Per pt's after visit summary pt to wear [place] collar at all times, but Estefani collar for showering, no shower restrictions  OTR/L attempted to don [place] collar however fit too large 2" length of pt's neck, OTR/L spoke with restorative specialist Estella who verbalized he will bring pediatric [place] collar to pt and assess fit, as well as address pt's c/o [place] collar being uncomfortable  Pt limited in ADL and functional mobility/transfers 2* impaired standing balance/tolerance, UE ROM/strength, pain, impaired activity tolerance, fatigue  Pt will continue to benefit from skilled OT intervention to address long handled adaptive equipment training, standing balance/tolerance, administration of [place] Cognitive Assessment, in order to maximize functional independence in ADLS and functional mobility/transfers while decreasing burden of care   Of note, after session restorative specialist Estella present and reports pediatric collar and adult [place] collar inappropriate fit - he will talk to Karlie Adrian tomorrow  At this time pt would shower in [place] collar, change pads after  Problem List Decreased strength;Decreased range of motion;Decreased endurance; Impaired balance;Decreased mobility   Plan   Treatment/Interventions ADL retraining;Functional transfer training; Therapeutic exercise; Endurance training;Patient/family training;Equipment eval/education; Compensatory technique education   Progress Progressing toward goals   Recommendation   OT Discharge Recommendation (pending progress )   OT Therapy Minutes   OT Time In 0830   OT Time Out 1010   OT Total Time (minutes) 100   OT Mode of treatment - Individual (minutes) 100   OT Mode of treatment - Concurrent (minutes) 0   OT Mode of treatment - Group (minutes) 0   OT Mode of treatment - Co-treat (minutes) 0   OT Mode of Teatment - Total time(minutes) 100 minutes   Therapy Time missed   Time missed?  No       Afsaneh De Paz MS, OTR/L , CBIS

## 2019-01-23 PROCEDURE — 97530 THERAPEUTIC ACTIVITIES: CPT

## 2019-01-23 PROCEDURE — 97116 GAIT TRAINING THERAPY: CPT

## 2019-01-23 PROCEDURE — 97535 SELF CARE MNGMENT TRAINING: CPT

## 2019-01-23 PROCEDURE — 97110 THERAPEUTIC EXERCISES: CPT

## 2019-01-23 PROCEDURE — G0515 COGNITIVE SKILLS DEVELOPMENT: HCPCS

## 2019-01-23 PROCEDURE — 99233 SBSQ HOSP IP/OBS HIGH 50: CPT | Performed by: PHYSICAL MEDICINE & REHABILITATION

## 2019-01-23 RX ORDER — OXYCODONE HYDROCHLORIDE 5 MG/1
2.5 TABLET ORAL ONCE
Status: COMPLETED | OUTPATIENT
Start: 2019-01-23 | End: 2019-01-23

## 2019-01-23 RX ADMIN — OXYCODONE HYDROCHLORIDE 2.5 MG: 5 TABLET ORAL at 00:28

## 2019-01-23 RX ADMIN — DOCUSATE SODIUM 100 MG: 100 CAPSULE, LIQUID FILLED ORAL at 09:18

## 2019-01-23 RX ADMIN — METHOCARBAMOL 500 MG: 500 TABLET, FILM COATED ORAL at 17:24

## 2019-01-23 RX ADMIN — GABAPENTIN 300 MG: 300 CAPSULE ORAL at 09:14

## 2019-01-23 RX ADMIN — Medication 1000 MG: at 09:21

## 2019-01-23 RX ADMIN — OXYCODONE HYDROCHLORIDE 2.5 MG: 5 TABLET ORAL at 13:22

## 2019-01-23 RX ADMIN — DOCUSATE SODIUM 100 MG: 100 CAPSULE, LIQUID FILLED ORAL at 16:42

## 2019-01-23 RX ADMIN — HYDROXYCHLOROQUINE SULFATE 200 MG: 200 TABLET, FILM COATED ORAL at 07:47

## 2019-01-23 RX ADMIN — LIDOCAINE 1 PATCH: 50 PATCH CUTANEOUS at 09:20

## 2019-01-23 RX ADMIN — OXYCODONE HYDROCHLORIDE 2.5 MG: 5 TABLET ORAL at 09:17

## 2019-01-23 RX ADMIN — NYSTATIN: 100000 POWDER TOPICAL at 09:21

## 2019-01-23 RX ADMIN — OXYCODONE HYDROCHLORIDE 2.5 MG: 5 TABLET ORAL at 17:24

## 2019-01-23 RX ADMIN — OXYCODONE HYDROCHLORIDE 2.5 MG: 5 TABLET ORAL at 05:30

## 2019-01-23 RX ADMIN — METHOCARBAMOL 500 MG: 500 TABLET, FILM COATED ORAL at 11:31

## 2019-01-23 RX ADMIN — NYSTATIN: 100000 POWDER TOPICAL at 16:42

## 2019-01-23 RX ADMIN — SERTRALINE HYDROCHLORIDE 25 MG: 25 TABLET ORAL at 21:50

## 2019-01-23 RX ADMIN — CALCIUM CARBONATE 500 MG (1,250 MG)-VITAMIN D3 200 UNIT TABLET 1 TABLET: at 16:42

## 2019-01-23 RX ADMIN — TROLAMINE SALICYLATE 1 APPLICATION: 10 CREAM TOPICAL at 13:23

## 2019-01-23 RX ADMIN — ACETAMINOPHEN 975 MG: 325 TABLET, FILM COATED ORAL at 21:35

## 2019-01-23 RX ADMIN — METHOCARBAMOL 500 MG: 500 TABLET, FILM COATED ORAL at 23:23

## 2019-01-23 RX ADMIN — GABAPENTIN 600 MG: 300 CAPSULE ORAL at 21:35

## 2019-01-23 RX ADMIN — ACETAMINOPHEN 975 MG: 325 TABLET, FILM COATED ORAL at 14:08

## 2019-01-23 RX ADMIN — FUROSEMIDE 20 MG: 20 TABLET ORAL at 09:18

## 2019-01-23 RX ADMIN — CALCIUM CARBONATE 500 MG (1,250 MG)-VITAMIN D3 200 UNIT TABLET 1 TABLET: at 07:47

## 2019-01-23 RX ADMIN — METOPROLOL TARTRATE 25 MG: 25 TABLET, FILM COATED ORAL at 09:14

## 2019-01-23 RX ADMIN — CLOPIDOGREL BISULFATE 75 MG: 75 TABLET ORAL at 09:20

## 2019-01-23 RX ADMIN — TROLAMINE SALICYLATE 1 APPLICATION: 10 CREAM TOPICAL at 07:48

## 2019-01-23 RX ADMIN — ACETAMINOPHEN 975 MG: 325 TABLET, FILM COATED ORAL at 05:31

## 2019-01-23 RX ADMIN — LOSARTAN POTASSIUM 100 MG: 50 TABLET, FILM COATED ORAL at 09:18

## 2019-01-23 RX ADMIN — METOPROLOL TARTRATE 25 MG: 25 TABLET, FILM COATED ORAL at 21:35

## 2019-01-23 RX ADMIN — ENOXAPARIN SODIUM 40 MG: 40 INJECTION SUBCUTANEOUS at 09:19

## 2019-01-23 RX ADMIN — METHOCARBAMOL 500 MG: 500 TABLET, FILM COATED ORAL at 05:30

## 2019-01-23 RX ADMIN — OXYCODONE HYDROCHLORIDE 2.5 MG: 5 TABLET ORAL at 21:36

## 2019-01-23 RX ADMIN — GABAPENTIN 300 MG: 300 CAPSULE ORAL at 14:08

## 2019-01-23 RX ADMIN — STANDARDIZED SENNA CONCENTRATE 8.6 MG: 8.6 TABLET ORAL at 21:36

## 2019-01-23 RX ADMIN — LEVOTHYROXINE SODIUM 112 MCG: 112 TABLET ORAL at 05:31

## 2019-01-23 RX ADMIN — AMLODIPINE BESYLATE 10 MG: 10 TABLET ORAL at 09:19

## 2019-01-23 NOTE — PROGRESS NOTES
01/23/19 1000   Pain Assessment   Pain Assessment 0-10   Pain Score 5   Pain Type Acute pain;Surgical pain   Pain Location Head;Neck; Shoulder   Pain Orientation Bilateral   Pain Descriptors Sharp;Radiating   Pain Frequency Intermittent   Restrictions/Precautions   Precautions Fluid restriction; Fall Risk;Spinal precautions;Pain   Weight Bearing Restrictions No   Braces or Orthoses C/S Collar   Cognition   Overall Cognitive Status Impaired   Subjective   Subjective pt complained of pain throughout session  nursing notified at end of session for pain meds     QI: Sit to Stand   Assistance Needed Physical assistance;Verbal cues   Assistance Provided by [place] Less than 25%   Comment verbal cues needed for sequencing    Sit to Stand CARE Score 3   QI: Chair/Bed-to-Chair Transfer   Assistance Needed Incidental touching; Adaptive equipment   Comment rolling walker    Chair/Bed-to-Chair Transfer CARE Score 4   Transfer Bed/Chair/Wheelchair   Limitations Noted In Endurance;LE Strength;Pain Management;Balance; Sequencing   Adaptive Equipment Roller Walker   Stand Pivot Contact Guard   Sit to Stand Minimal Assist;Contact Guard   Stand to [employer] pt needed vc for sequencing  with repeated STS demonstrated good carry over, with repeated txs without vc and later in session pt unable to carry over without vc   Bed, Chair, Wheelchair Transfer (FIM) 3 - [place] needs to lift, boost or assist to stand OR sit   QI: Car Transfer   Assistance Needed Physical assistance   Assistance Provided by [place] 50%-74%   Comment assistance with BL LE in and out of car    Car Transfer CARE Score 2   QI: Walk 10 Feet   Assistance Needed Incidental touching; Adaptive equipment   Walk 10 Feet CARE Score 4   QI: Walk 50 Feet with Two Turns   Assistance Needed Incidental touching; Adaptive equipment   Walk 50 Feet with Two Turns CARE Score 4   QI: Walk 150 Feet   Assistance Needed Incidental touching; Adaptive equipment   Walk 150 Feet CARE Score 4   QI: Walking 10 Feet on Uneven Surfaces   Reason if not Attempted Safety concerns   Walking 10 Feet on Uneven Surfaces CARE Score 88   Ambulation   Does the patient walk? 2  Yes   Primary Discharge Mode of Locomotion Walk   Walk Assist Level Contact Guard   Gait Pattern Antalgic; Slow Jenny; Inconsistant Jenny; Forward Flexion;Decreased foot clearance; Improper weight shift   Assist Device Roller Walker   Distance Walked (feet) 55 ft  (x1, 70 x1, 150 x1 )   Limitations Noted In Endurance;Balance;Posture; Sequencing;Speed;Strength   Findings pt requires increased time to perform takes standing rest breaks to improve posture  decreased distance with fatigue    Walking (FIM) 2 - Patient ambulates between 50 - 149 feet regardless of assist/device/set up   Wheelchair mobility   QI: Does the patient use a wheelchair? 0  No   QI: 1 Step (Curb)   Reason if not Attempted Safety concerns   1 Step (Curb) CARE Score 88   QI: 4 Steps   Reason if not Attempted Safety concerns   4 Steps CARE Score 88   QI: 12 Steps   Reason if not Attempted Safety concerns   12 Steps CARE Score 88   Therapeutic Interventions   Strengthening LAQ BL 2 lbs x30, STS for functional strengthening 2 set x5   Flexibility seated BL gastroc and HS stretching 3 min    Equipment Use   NuStep 10 level 1 LE only    Assessment   Treatment Assessment pt demonstrated good progress in gait training but still limited by pain and fatigue  continues to need vc for carryover in txs to inprove functional independance  pt requires rest breaks during amb to improve posture  pt to contiue to focus on strengthening, endurance, sequencing and safety to progress with functional mobility and independance    Problem List Decreased strength;Decreased endurance; Impaired balance;Decreased mobility; Decreased safety awareness;Orthopedic restrictions;Pain   Barriers to Discharge Decreased caregiver support; Inaccessible home environment   PT Barriers   Physical Impairment Decreased strength;Decreased endurance; Impaired balance;Decreased mobility; Decreased safety awareness;Pain;Orthopedic restrictions   Functional Limitation Car transfers;Stair negotiation;Standing;Transfers; Walking   Plan   Treatment/Interventions Functional transfer training;LE strengthening/ROM; Therapeutic exercise; Endurance training;Patient/family training;Gait training   Progress Progressing toward goals   Recommendation   Recommendation 24 hour supervision/assist;Home PT; Home with family support   Equipment Recommended Walker   PT Therapy Minutes   PT Time In 1000   PT Time Out 1130   PT Total Time (minutes) 90   PT Mode of treatment - Individual (minutes) 90   PT Mode of treatment - Concurrent (minutes) 0   PT Mode of treatment - Group (minutes) 0   PT Mode of treatment - Co-treat (minutes) 0   PT Mode of Teatment - Total time(minutes) 90 minutes   Therapy Time missed   Time missed?  No

## 2019-01-23 NOTE — PCC NURSING
Admitted s/p Traumatic Type 3 odontoid fracture s/p fall-C2 and C6 fractures (non-op)  Wears Cervical collar AAT  Pain is managed with Tylenol, neurontin, lidocaine patches, robaxin and oxycodone as well as home med Aspercream  Pt has HX: CHF, HTN, anemia, B/L LE edema, hyponatremia, DM, AVR, stroke, arthritis, constipation, anxiety, depression, hypothyroid  She is currently taking Plavix for anti-coag, and uses compression stockings from home  HTN managed with lopressor, norvasc, lasix  She can be Forgetful, occasional confusion at night  And has Patient states she has facial numbness which is not new  She is on a 1200cc F R  Pt will be discharged home today with supervision  We will review medication administration and cervical precautions  We will review skin care and turning and repositioning as well as weight shifts   Followup appointments will be discussed and AVS summary will be provided

## 2019-01-23 NOTE — PROGRESS NOTES
Patient complaining of increase pain with no relief from PRN medications  Dr Olivia Nearyaniv notified  Verbal order received from  for 2 5 oxycodone x1  Order placed  Will administer

## 2019-01-23 NOTE — TEAM CONFERENCE
Acute RehabilitationTeam Conference Note  Date: 1/23/2019   Time: 11:05 AM       Patient Name:  Mojgan Solano       Medical Record Number: [id number]   YOB: 1934  Sex: Female          Room/Bed:  [facility] 973-01  Payor Info:  Payor: [address] [place] [address] / Plan: [facility] REP / Product Type: Medicare HMO /      Admitting Diagnosis: Fracture Salonila Fleeting  8XXA]   Admit Date/Time:  1/17/2019  4:48 PM  Admission Comments: No comment available     Primary Diagnosis:  Ambulatory dysfunction  Principal Problem: Ambulatory dysfunction    Patient Active Problem List    Diagnosis Date Noted    Hyponatremia 01/21/2019    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    Chronic diastolic heart failure ([facility] ) 01/17/2019    History of CVA (cerebrovascular accident) 01/17/2019    Type 2 diabetes mellitus with diabetic polyneuropathy [facility] ) 01/17/2019    Depression 01/17/2019    Chronic kidney disease 01/17/2019    Rheumatoid arthritis involving multiple sites ([facility] ) 01/17/2019    Fibromyalgia 01/17/2019    History of aortic valve replacement with bioprosthetic valve 01/17/2019    Delirium 01/11/2019    Fall 01/10/2019    Forgetfulness 01/10/2019    Ambulatory dysfunction 01/10/2019    Physical deconditioning 01/10/2019    Acute pain 01/10/2019    Closed nondisplaced fracture of second cervical vertebra ([facility] ) 01/09/2019    Neck pain, acute 01/09/2019    Type III fracture of odontoid process ([facility] ) 01/09/2019    C6 cervical fracture ([facility] ) 01/09/2019    Hypertension 01/09/2019    Hypothyroidism 01/09/2019       Physical Therapy:    Weight Bearing Status: Full Weight Bearing  Transfers: Moderate Assistance  Bed Mobility:  (NA, sleeps in recliner)  Amulation Distance (ft): 25 feet  Ambulation: Minimal Assistance, Contact Guard  Assistive Device for Ambulation: Roller Walker  Wheelchair Mobility Distance:  (NA)  Number of Stairs: 1  Assistive Device for Stairs: [address]:  Moderate Assistance  Ramp: Minimal Assistance  Assistive Device for Ramp: Roller Walker  Discharge Recommendations: Home with:  [address] with[de-identified] Family Support, Home Physical Therapy    1/22/19  Pt presents with decline in function  Pt c/o pain ion neck, spasms between shoulders, radiating to head limiting her ability to participate at times  Pt retropulsive with STS transfers, relying on B UE more than legs  CGA/MiN A with ambulation, but short distances only at this time  Slow progress towards LTGs  Will benefit form continued skilled therapy to maximize functional mobility, decrease  [place] of care and risk for falls  Occupational Therapy:  Eating: Supervision  Grooming: Minimal Assistance  Bathing: Moderate Assistance  Bathing: Moderate Assistance  Upper Body Dressing: Total Assistance  Lower Body Dressing: Maximum Assistance  Toileting: Maximum Assistance  Tub/Shower Transfer: Moderate Assistance  Toilet Transfer: Moderate Assistance  Cognition: Exceptions to WNL  Cognition: Decreased Memory  Orientation: Person, Place, Time, Situation  Discharge Recommendations: Other       Pt presents with impairments in activity tolerance, endurance, standing balance/tolerance, UE strength, UE ROM, memory and pain  Additional functional barriers include fatigue, HERNANDEZ, decreased caregiver support, risk for falls and home environment  Pt will continue to benefit from skilled OT services to address above mentioned barriers and maximize functional independence in baseline areas of occupation   OT D/C recommendation is for re-team                Speech Therapy:           No notes on file    Nursing Notes:  Appetite: Good  Diet Type: Cardiac, 2 gram sodium diet                                                                     Pain Location: Eye, Head  Pain Orientation: Bilateral  Pain Score: 3                       Hospital Pain Intervention(s): Medication (See MAR)          Admitted s/p Traumatic Type 3 odontoid fracture s/p fall-C2 and C6 fractures (non-op)  Wears Cervical collar AAT  Pain is managed with Tylenol, neurontin, lidocaine patches, robaxin and oxycodone as well as home med Aspercream  Pt has HX: CHF, HTN, anemia, B/L LE edema, hyponatremia, DM, AVR, stroke, arthritis, constipation, anxiety, depression, hypothyroid  She is currently taking Plavix for anti-coag, and uses compression stockings from home  HTN managed with lopressor, norvasc, lasix  She can be Forgetful, occasional confusion at night  And has Patient states she has facial numbness which is not new  She is on a 1500cc F R  We will monitor lab values, vital signs and work on pain management  We will work with patient on proper use of cervical collar  We will educate pt on turning, repositioning and weight shifts to prevent skin breakdown  Pt will practice safety awareness and remain free from injury  Case Management:     Discharge Planning  Living Arrangements: Children  Support Systems: [place] community  Assistance Needed: yes  Type of Current Residence: Private residence  Current Home Care Services: No  Pt is participating with therapy and expects to return home  Family is supportive and aware of potential recommendations for contd therapy services  Following to assist w/dc planning needs  Is the patient actively participating in therapies? yes  List any modifications to the treatment plan:     Barriers Interventions   pain Pain meds, repositioning   Upper extremity rom and strength, standing balance/tolerance Therapy exercises, adaptive equipment training   deconditioning Therapy strengthening exercises, energy conservation education             Is the patient making expected progress toward goals?  yesyes  List any update or changes to goals:     Medical Goals: Patient will be medically stable for discharge to [facility] upon completion of rehab program and Patient will be able to manage medical conditions and comorbid conditions with medications and follow up upon completion of rehab program    Weekly Team Goals:   Rehab Team Goals  ADL Team Goal: Patient will require supervision with ADLs with least restrictive device upon completion of rehab program  Bowel/Bladder Team Goal: Patient will return to premorbid level for bladder/bowel management upon completion of rehab program  Transfer Team Goal: Patient will require supervision with transfers with least restrictive device upon completion of rehab program  Locomotion Team Goal: Patient will require supervision with locomotion with least restrictive device upon completion of rehab program  Cognitive Team Goal: Patient will be independent for basic  tasks and require supervision for complex tasks upon completion of rehab program    Discussion: pt presents with the above barriers and is funcitoning at max a with adls,  Min to mod to stand, close supervision walker with walker  Pt was using two canes prior to admisison and is now aware of need for walker  Goals are for supervision on dc with the use of a walker  Estimated los totalling 3 weeks  Anticipated Discharge Date:  reteam [facility] Team Members Present: The following team members are supervising care for this patient and were present during this Weekly Team Conference      Physician: Dr Adele Roy MD  : ODALYS Beltran  Registered Nurse: Sara Del Rio, RN, CRRN  Physical Therapist: Alondra Garcia DPT  Occupational Therapist: Gladys Crockett MS, OTR/L, CBIS  Speech Therapist:   Maggie

## 2019-01-23 NOTE — PROGRESS NOTES
Physical Medicine and Rehabilitation Progress Note  Karlene Hernandez 80 y o  female MRN: [id number]  Unit/Bed#: -01 Encounter: [id number]    HPI: Kieranhoward Ayala a 80 y  o  female who presented to the [facility] as a trauma following a fall on Plavix  She utilizes a SPC at baseline, and had hit a wet patch on the floor causing the cane to slip from under her  Her PMH is significant for HTN with history of CVA, AVR, HTN, and T2DM  CT C-Spine revealed a Type 3 Odontoid fracture extending to the R and L superior articular facets and L transverse foramen  She also had an acute non-displaced C6 fracture  Treated non-op  Course c/b by fluid overload, hospital delirium, and  Pain  These are improving  She was evaluated by PT/OT and determined to be appropriate for discharge to the [facility] on 1/17/19  Chief Complaint: Feeling well today  Interval: Patient s/e at bedside  Overnight had significant pain radiating from her neck on the left side, up around her ear to her face  It is tender to palpation and recreates the radiation pattern  It is localized to the level of her fracture  She denies new weakness/numbness/tingling  Required extra dose of Oxycodone  She feels that the robaxin also provides significantly relief, but only takes it 3-4x a day  No new bowel/bladder dysfunction  This morning pain is 4/10 and more manageable  ROS:  Negative except for what is noted in HPI/CC/Sbuj    Assessment/Plan:      * Ambulatory dysfunction   Assessment & Plan    Patient will participate in a comprehensive inpatient rehab program with 3-5 hours a day 5-7 days a week of PT/OT  To evaluate for any further SLP needs    - Continue pain management  - Fall precautions     C6 cervical fracture (HCC)   Assessment & Plan    Non-op management  - Pain control as noted above in Type III odontoid fracture  - PT/OT     Type III fracture of odontoid process [facility])   Assessment & Plan    Please see ambulatory dysfunction  C-Collar ATC   - Pain management - goal to wean off of opioids   - Continue Oxycodone 2 5mg as needed  - Discontinued breakthrough  - Continue Tylenol ATC  - Continue Robaxin as needed for muscle spasms - try this if pain recurs  May need to schedule this medication    - Lidocaine patch  - Cervical precautions  - Will need follow-up with Neurosurgery on 1/28/19    - The pain she is having may have a myofascial component  Hyponatremia   Assessment & Plan    1/22 Na improved from 130 to 133 with fluid restriction and liberalizing salt intake with diet  Follow-up labs  History of aortic valve replacement with bioprosthetic valve   Assessment & Plan    Monitor cardiopulmonary status  IM to manage     Fibromyalgia   Assessment & Plan    Continue sertraline  Continue gabapentin (Renally dosed)  Consult neuropsychology for adjustment and coping skills for pain management  Rheumatoid arthritis involving multiple sites [facility])   Assessment & Plan    Continue plaquenil  - Was also on hydrocodone but titrating off at home  - Monitor for flare  - IM following     Chronic kidney disease   Assessment & Plan    Crea stable  Depression   Assessment & Plan    Continue sertraline  Type 2 diabetes mellitus with diabetic polyneuropathy [facility])   Assessment & Plan    Lab Results   Component Value Date    HGBA1C 5 2 01/18/2019       No results for input(s): POCGLU in the last 72 hours  Blood Sugar Average: Last 72 hrs:  - Monitor fasting glucose  - Continue diabetic diet  - At home diet controlled  - Placed on diabetic diet  - IM to manage     History of CVA (cerebrovascular accident)   Assessment & Plan    No tim sequelae  Cannot tolerate statins  Continue Plavix  Continue fish oil  At home on red yeast rice as well  Chronic diastolic heart failure (HCC)   Assessment & Plan    With some volume overload early in hospital stay     Now examines euvolemic  Monitor kidney function on lasix   Continue ARB/BB     Renovascular hypertension   Assessment & Plan    Continue increased Amlodipine 10mg  Continues home Losartan 100mg  Continue metoprolol tartrate 25mg Q12  Change hydralazine PRN to PO  S/p stenting for DONAVAN  IM managing, recs appreciated       # Skin  · Encourage regular turning as patient at risk for skin breakdown  · Staff to continue patient education on Q2h turning     # Bowel  · Patient reports no constipation  · last BM 1/23/19  Continu regimen of Colace, senna, and miralax       # Bladder  · Patient voiding spontaneously    # Pain  · Continue tylenol, for max of 3gm daily  · Continue oxycodone   · Continue methocarbamol  · Continue gabapentin    # Rehab Psych   · referral to Rehabilitation Psychology    # Other  - Diet/Nutrition:        Diet Orders            Start     Ordered    01/21/19 1337  Diet Cardiovascular; Sodium 4 Gm (KARMA); Fluid Restriction 1500 ML  Diet effective now     Question Answer Comment   Diet Type Cardiovascular    Cardiac Sodium 4 Gm (KARMA)    Other Restriction(s): Fluid Restriction 1500 ML    RD to adjust diet per protocol? Yes        01/21/19 1336    01/18/19 0721  Room Service  Once     Question:  Type of Service  Answer:  Room Service - Appropriate with Assistance    01/18/19 0720    01/17/19 1654  Dietary nutrition supplements  Once     Question Answer Comment   Select Supplement: Ensure Enlive-Vanilla    Frequency Lunch        01/17/19 1653        - DVT prophy: Sequential compression device (Venodyne)  and Enoxaparin (Lovenox)  - GI ppx: None  - Nausea: None  - Supplements: None  - Sleep: None    Disposition: Team 1/23/19: UE ROM/functional strength are barriers  She may have some cognitive deficits at baseline  OT to check MOCA today  Also having issues with the fit of her [employer] collar for showers  Anticipate that her length of stay may be about 2-3 weeks  Will reteam next week      CODE: Level 1: Full Code   Scheduled Meds:    Current Facility-Administered Medications:  acetaminophen 975 mg Oral [facility] Lima Self MD   albuterol 2 puff Inhalation Q4H PRN Lima Self MD   amLODIPine 10 mg Oral Daily Lima Self MD   calcium carbonate-vitamin D 1 tablet Oral BID With Meals Lima Self MD   clopidogrel 75 mg Oral Daily Lima Self MD   docusate sodium 100 mg Oral BID Lima Self MD   enoxaparin 40 mg Subcutaneous Q24H [address] Lima Self MD   fish oil 1,000 mg Oral Daily Lima Self MD   furosemide 20 mg Oral Daily Lima Self MD   gabapentin 300 mg Oral BID Ewelina Perales PA-C   gabapentin 600 mg Oral HS Lima Self MD   hydroxychloroquine 200 mg Oral Daily With Breakfast Lima Self MD   levothyroxine 112 mcg Oral Early Morning Lima Self MD   lidocaine 1 patch Topical Daily Lima Self MD   lidocaine 1 patch Topical Daily Lima Self MD   losartan 100 mg Oral Daily Lima Self MD   methocarbamol 500 mg Oral Q6H PRN Lima Self MD   metoprolol tartrate 25 mg Oral Q12H [address] Lima Self MD   nystatin  Topical BID Lima Self MD   oxyCODONE 2 5 mg Oral Q4H PRN Lima Self MD   polyethylene glycol 17 g Oral Daily PRN Lima Self MD   senna 1 tablet Oral HS Lima Self MD   sertraline 25 mg Oral Daily Lima Self MD   trolamine salicylate 1 application Topical 4x Daily PRN Ewelina Perales PA-C        Objective:    Functional Update:   1/23/19 OT: Eating - supervision, Deondre grooming, modA bathing, Total A UB dressing, MaxA LB dressing, maxA toileting, modA tub/shower transfer, modA toilet transfer  1/23/19 PT: modA transfers, CGA-Deondre ambulation 25' with RW, modA for stairs, Deondre ramp       Allergies per EMR    Physical Exam:  Temp:  [97 5 °F (36 4 °C)-98 °F (36 7 °C)] 97 5 °F (36 4 °C)  HR:  [53-66] 56  Resp:  [18] 18  BP: (108-142)/(60-72) 110/65  SpO2:  [97 %-99 %] 97 %    General: alert, no apparent distress, cooperative and comfortable  HEENT:  Head: Normocephalic, no lesions, without obvious abnormality  Neck has C-collar in place  No signs of skin breakdown at occiput/chin  CARDIAC:  regular rate and rhythm, S1, S2 normal, no murmur, click, rub or gallop  LUNGS:  normal air entry, lungs clear to auscultation  ABDOMEN:  soft, non-tender  Bowel sounds normal  No masses, no organomegaly  EXTREMITIES:  Her socks leave an imprint distal 1/3 of her calf  NEURO:   normal without focal findings, mental status, speech normal, alert and oriented x3, SANDEEP and Strength is intact throughout  5/5  PSYCH:  Normal  and Alert and oriented, appropriate affect  INCISION:  None   MSK: She has paraspinal tenderness very localized that follows a radiation pattern through the side of her head to her face  Localized at level of fracture       Diagnostic Studies: Reviewed, no new imaging  No orders to display       Laboratory: Reviewed    Results from last 7 days  Lab Units 01/21/19  1004 01/18/19  0550   HEMOGLOBIN g/dL 12 1 10 9*   HEMATOCRIT % 36 7 34 0*   WBC Thousand/uL 6 69 6 95       Results from last 7 days  Lab Units 01/22/19  0527 01/21/19  1004 01/18/19  0550   BUN mg/dL 34* 30* 31*   SODIUM mmol/L 133* 130* 134*   POTASSIUM mmol/L 4 5 4 1 3 7   CHLORIDE mmol/L 98* 98* 98*   CREATININE mg/dL 1 07 0 99 1 14   AST U/L  --   --  35   ALT U/L  --   --  43            Patient Active Problem List   Diagnosis    Closed nondisplaced fracture of second cervical vertebra (HCC)    Neck pain, acute    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)    C6 cervical fracture (HCC)    Hypertension    Hypothyroidism    Fall    Forgetfulness    Ambulatory dysfunction    Physical deconditioning    Acute pain    Delirium    Renovascular hypertension    Chronic diastolic heart failure (HCC)    History of CVA (cerebrovascular accident)    Type 2 diabetes mellitus with diabetic polyneuropathy (HCC)    Depression    Chronic kidney disease    Rheumatoid arthritis involving multiple sites ([facility] )    Fibromyalgia    History of aortic valve replacement with bioprosthetic valve    Hyponatremia       ** Please Note: Fluency Direct voice to text software may have been used in the creation of this document  **    Total time spent: At least 35 minutes, with more than 50% spent counseling/coordinating care  In addition, this patient was discussed by the interdisciplinary team in weekly case conference today  The care of the patient was extensively discussed with all care providers and an appropriate rehabilitation plan was formulated unique for this patient  Barriers were identified preventing progression of therapy and appropriate interventions were discussed with each discipline  Please see the team note for input from all disciplines regarding barriers, intervention, and discharge planning

## 2019-01-23 NOTE — PROGRESS NOTES
01/23/19 1230   Pain Assessment   Pain Assessment 0-10   Pain Score 6   Pain Type Acute pain   Pain Location Back;Head   Pain Orientation Posterior;Bilateral   Restrictions/Precautions   Precautions Fall Risk;Supervision on toilet/commode;Spinal precautions;Pain   Braces or Orthoses C/S Collar   QI: Putting On/Taking Off Footwear   Assistance Needed Physical assistance   Assistance Provided by [place] Less than 25%   Comment LHAE, pt is able to complete with increased time and VC's  Putting On/Taking Off Footwear CARE Score 3   QI: Sit to Stand   Assistance Needed Physical assistance   Assistance Provided by [place] 25%-49%   Sit to Stand CARE Score 3   QI: Chair/Bed-to-Chair Transfer   Assistance Needed Physical assistance   Assistance Provided by [place] 50%-74%   Chair/Bed-to-Chair Transfer CARE Score 2   Transfer Bed/Chair/Wheelchair   Limitations Noted In Balance; Endurance;Pain Management;UE Strength;LE Strength;Confidence   Adaptive Equipment Roller Walker   Bed, Chair, Wheelchair Transfer (FIM) 3 - [place] needs to lift, boost or assist to stand OR sit   QI: [address] Needed Physical assistance   Assistance Provided by [place] 50%-74%   Comment Pt requires A for CM up over hips, with encouragement pt is able to complete hygiene  Toileting Hygiene CARE Score 2   Toileting   Able to Pull Clothing down no, up no  Able to Manage Clothing Closures No   Manage Hygiene Bladder   Limitations Noted In Balance; Safety;UE Strength;LE Strength   Toileting (FIM) 2 - Patient completes 25-49% of all tasks   QI: Toilet Transfer   Assistance Needed Physical assistance   Assistance Provided by [place] 50%-74%   Toilet Transfer CARE Score 2   Toilet Transfer   Surface Assessed Standard Toilet   Transfer Technique Standard   Limitations Noted In Balance; Endurance; Safety;UE Strength;LE Strength   Findings Pt requires unilateral UE on grab bar to maintain balance      Toilet Transfer (FIM) 3 - [place] needs to lift, boost or assist to stand OR sit   Exercise Tools   Other Exercise Tool 1 Pt completes non resistive towel glides moving to her tolerance through all planes completing 3 x 10 each to increase UB ROM for increased independence with ADL tasks  Cognition   Overall Cognitive Status Impaired   Arousal/Participation Cooperative   Attention Attends with cues to redirect   Orientation Level Oriented to person;Oriented to place;Oriented to situation   Memory Decreased recall of precautions   Following Commands Follows one step commands with increased time or repetition   Comments Pt completed Jason Cognitive Assessment (MOCA) version 8 1  Pt scored overall 20/30  indicating a mild cognitive deficit  Visuospatial/executive: 3/5 Naming: 3/3 Memory:    (worth no points) Attention:  2/6 Language: 1/ 3 Abstraction: 1/2 Delayed recall: 4/ 5 Orientation: 5/ 6   Activity Tolerance   Activity Tolerance Patient tolerated treatment well   Assessment   Treatment Assessment Pt participated in skilled OT services with focus on c collar management, endurance, UE ROM, and functional cognition  Pt continues to be limited by pain, decreased endurance, decreased strength, dec UE ROM, and decreased functional cognition  Pt administered MOCA with scoring of 20/30 indicating a mild cognitive impairment, refer above for details  Pt continues to require modA for all functional transfers and VC's for proper technique  Pt continues to require total A for C collar management and education on pad change  Pt states her daughter will be able to assist at home  Pt will continue to benefit from skilled OT services with focus on family training for C collar management, postural control, and standing tolerance/balance  Prognosis Fair   Problem List Decreased strength;Decreased endurance; Impaired balance;Decreased mobility; Decreased safety awareness;Orthopedic restrictions;Pain   Plan   Treatment/Interventions ADL retraining;Functional transfer training; Therapeutic exercise; Endurance training;Cognitive reorientation;Equipment eval/education; Compensatory technique education   Progress Progressing toward goals   Recommendation   OT Discharge Recommendation Home with family support   OT Therapy Minutes   OT Time In 1230   OT Time Out 1400   OT Total Time (minutes) 90   OT Mode of treatment - Individual (minutes) 90   OT Mode of treatment - Concurrent (minutes) 0   OT Mode of treatment - Group (minutes) 0   OT Mode of treatment - Co-treat (minutes) 0   OT Mode of Teatment - Total time(minutes) 90 minutes   Therapy Time missed   Time missed?  No

## 2019-01-23 NOTE — PROGRESS NOTES
Internal Medicine Progress Note  Patient: Norman Maldonado  Age/sex: 80 y o  female  Medical Record #: [id number]      ASSESSMENT/PLAN:  Norman Maldonado is seen and examined and management for following issues:    Type III odontoid fracture with fracture line extending to the right/left superior articular facets and left transverse foramen of C2; nondisplaced fracture through bulky, bridging osteophytes at the C6 level:  required no surgery  Continue collar  Pt reported severe Rt facial pain on 1/19 and an afternoon dose of gabapentin was added  Facial pain resolved today      HTN: stable on Norvasc 10mg qd, Cozaar 100mg qd, Lopressor 25mg q12hrs  Hyponatremia:  Improved; continue the diet change from 2GM to 4GM sodium/FR 1500ml  Will recheck sodium 1/24      CKD; baseline 1 1-1 3:  Stable     Hx bioAVR 2012: had no significant disease when had card cath before surgery     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 2016:  continue Plavix that she has been on since CVA 2005; with her TIA 2016, neuro kept Plavix; she is intol statins     DM diet controlled:   watch fasting blood sugars      Chronic diastolic CHF, LVEF 68%, mild-mod MR/mild TR and norm function AVR 2016:  continue Lasix as at home  Follows with Dr Brayan Amador  Family says LE edema is less than at home     RA/SLE:  continue Plaquenil     HLD: intol to statin; continue Red yeast rice and Fish oil when she goes home     Hypothyroidism:  continue current Levothyroxine     Hx right RA stent: BPs since stent placement is controlled     Depression: Zoloft     Chronic pain/fibromyalgia:  on Hydrocodone at home     Hx breast cancer: follows with Shiva Fuchs      Subjective:  denies any complaints      ROS:   GI: denies abdominal pain, change bowel habits or reflux symptoms  Neuro: +Right facial pain  Respiratory: No Cough, SOB  Cardiovascular: No CP, palpitations     Scheduled Meds:    Current Facility-Administered Medications:  acetaminophen 975 mg Oral [facility] Emily Addison MD   albuterol 2 puff Inhalation Q4H PRN Leta Mccray MD   amLODIPine 10 mg Oral Daily Leta Mccray MD   calcium carbonate-vitamin D 1 tablet Oral BID With Meals Leta Mccray MD   clopidogrel 75 mg Oral Daily Leta Mccray MD   docusate sodium 100 mg Oral BID Leta Mccray MD   enoxaparin 40 mg Subcutaneous Q24H [facility] Leta Mccray MD   fish oil 1,000 mg Oral Daily Leta Mccray MD   furosemide 20 mg Oral Daily Leta Mccray MD   gabapentin 300 mg Oral BID Ewelina Perales PA-C   gabapentin 600 mg Oral HS Leta Mccray MD   hydroxychloroquine 200 mg Oral Daily With Breakfast Leta Mccray MD   levothyroxine 112 mcg Oral Early Morning Leta Mccray MD   lidocaine 1 patch Topical Daily Leta Mccray MD   lidocaine 1 patch Topical Daily Leta cMcray MD   losartan 100 mg Oral Daily Leta Mccray MD   methocarbamol 500 mg Oral Q6H PRN Leta Mccray MD   metoprolol tartrate 25 mg Oral Q12H [facility] Leta Mccray MD   nystatin  Topical BID Leta Mccray MD   oxyCODONE 2 5 mg Oral Q4H PRN Leta Mccray MD   polyethylene glycol 17 g Oral Daily PRN Leta Mccray MD   senna 1 tablet Oral HS Leta Mccray MD   sertraline 25 mg Oral Daily Leta Mccray MD   trolamine salicylate 1 application Topical 4x Daily PRN Ewelina Perales PA-C       Labs:       Results from last 7 days  Lab Units 01/21/19  1004 01/18/19  0550   WBC Thousand/uL 6 69 6 95   HEMOGLOBIN g/dL 12 1 10 9*   HEMATOCRIT % 36 7 34 0*   PLATELETS Thousands/uL 205 182       Results from last 7 days  Lab Units 01/22/19  0527 01/21/19  1004   SODIUM mmol/L 133* 130*   POTASSIUM mmol/L 4 5 4 1   CHLORIDE mmol/L 98* 98*   CO2 mmol/L 26 27   BUN mg/dL 34* 30*   CREATININE mg/dL 1 07 0 99   CALCIUM mg/dL 8 9 9 1       Results from last 7 days  Lab Units 01/18/19  0550   HEMOGLOBIN A1C % 5 2              Results from last 7 days  Lab Units 01/18/19  1526 01/18/19  1118 01/18/19  0648   POC GLUCOSE mg/dl 124 115 97     @ARI@    Labs reviewed    Physical Examination:  Vitals:   Vitals:    01/22/19 2013 01/22/19 2118 01/23/19 0551 01/23/19 0914   BP: 142/72  108/60 110/65   BP Location: Left arm  Right arm    Pulse: (!) 53 66 56    Resp: 18  18    Temp: 97 8 °F (36 6 °C)  97 5 °F (36 4 °C)    TempSrc: Oral  Oral    SpO2: 98%  97%    Weight:       Height:           HEENT:  No thrush  RESP: CTAB, no R/R/W; resp unlabored  CV: +S1 S2, regular rate, no rubs/murmurs  ABD: soft, NT, ND, normal BS   EXT: mild edema of LEs L>R  Neuro: AAOx3  [ X ] Total time spent: 30 Mins and greater than 50% of this time was spent counseling/coordinating care  ** Please Note: Dragon 360 Dictation voice to text software may have been used in the creation of this document   **

## 2019-01-23 NOTE — SOCIAL WORK
Team conference summary sent to amisha kimble at [facility] [phone] for clinical update  Awaiting determination  Met w/pt and reviewed team update and barriers to dc  Pt in agreement and hopes to be able to return home doing things on her own as she was prior to admission  Pt aware insurance update was conducted today  Pt asked cm to contact her dtr joshua to make her aware as well  mssg left on dtr's phone, awaiting return call

## 2019-01-24 ENCOUNTER — APPOINTMENT (INPATIENT)
Dept: RADIOLOGY | Facility: HOSPITAL | Age: 84
DRG: 560 | End: 2019-01-24
Payer: COMMERCIAL

## 2019-01-24 PROBLEM — I70.1 RENAL ARTERY STENOSIS (HCC): Status: ACTIVE | Noted: 2019-01-24

## 2019-01-24 PROBLEM — I12.9 PARENCHYMAL RENAL HYPERTENSION: Status: ACTIVE | Noted: 2019-01-24

## 2019-01-24 LAB
ANION GAP SERPL CALCULATED.3IONS-SCNC: 6 MMOL/L (ref 4–13)
BASOPHILS # BLD AUTO: 0.05 THOUSANDS/ΜL (ref 0–0.1)
BASOPHILS NFR BLD AUTO: 1 % (ref 0–1)
BUN SERPL-MCNC: 39 MG/DL (ref 5–25)
CALCIUM SERPL-MCNC: 9.1 MG/DL (ref 8.3–10.1)
CHLORIDE SERPL-SCNC: 98 MMOL/L (ref 100–108)
CO2 SERPL-SCNC: 27 MMOL/L (ref 21–32)
CREAT SERPL-MCNC: 1.2 MG/DL (ref 0.6–1.3)
EOSINOPHIL # BLD AUTO: 0.23 THOUSAND/ΜL (ref 0–0.61)
EOSINOPHIL NFR BLD AUTO: 2 % (ref 0–6)
ERYTHROCYTE [DISTWIDTH] IN BLOOD BY AUTOMATED COUNT: 13.3 % (ref 11.6–15.1)
GFR SERPL CREATININE-BSD FRML MDRD: 42 ML/MIN/1.73SQ M
GLUCOSE SERPL-MCNC: 90 MG/DL (ref 65–140)
HCT VFR BLD AUTO: 34 % (ref 34.8–46.1)
HGB BLD-MCNC: 10.6 G/DL (ref 11.5–15.4)
IMM GRANULOCYTES # BLD AUTO: 0.05 THOUSAND/UL (ref 0–0.2)
IMM GRANULOCYTES NFR BLD AUTO: 1 % (ref 0–2)
LYMPHOCYTES # BLD AUTO: 2 THOUSANDS/ΜL (ref 0.6–4.47)
LYMPHOCYTES NFR BLD AUTO: 20 % (ref 14–44)
MCH RBC QN AUTO: 31.5 PG (ref 26.8–34.3)
MCHC RBC AUTO-ENTMCNC: 31.2 G/DL (ref 31.4–37.4)
MCV RBC AUTO: 101 FL (ref 82–98)
MONOCYTES # BLD AUTO: 0.63 THOUSAND/ΜL (ref 0.17–1.22)
MONOCYTES NFR BLD AUTO: 6 % (ref 4–12)
NEUTROPHILS # BLD AUTO: 7.32 THOUSANDS/ΜL (ref 1.85–7.62)
NEUTS SEG NFR BLD AUTO: 70 % (ref 43–75)
NRBC BLD AUTO-RTO: 0 /100 WBCS
PLATELET # BLD AUTO: 206 THOUSANDS/UL (ref 149–390)
PMV BLD AUTO: 9.4 FL (ref 8.9–12.7)
POTASSIUM SERPL-SCNC: 4.7 MMOL/L (ref 3.5–5.3)
RBC # BLD AUTO: 3.37 MILLION/UL (ref 3.81–5.12)
SODIUM SERPL-SCNC: 131 MMOL/L (ref 136–145)
WBC # BLD AUTO: 10.28 THOUSAND/UL (ref 4.31–10.16)

## 2019-01-24 PROCEDURE — 97530 THERAPEUTIC ACTIVITIES: CPT

## 2019-01-24 PROCEDURE — 97116 GAIT TRAINING THERAPY: CPT

## 2019-01-24 PROCEDURE — 97110 THERAPEUTIC EXERCISES: CPT

## 2019-01-24 PROCEDURE — 85025 COMPLETE CBC W/AUTO DIFF WBC: CPT | Performed by: NURSE PRACTITIONER

## 2019-01-24 PROCEDURE — 71046 X-RAY EXAM CHEST 2 VIEWS: CPT

## 2019-01-24 PROCEDURE — 99222 1ST HOSP IP/OBS MODERATE 55: CPT | Performed by: INTERNAL MEDICINE

## 2019-01-24 PROCEDURE — G0515 COGNITIVE SKILLS DEVELOPMENT: HCPCS

## 2019-01-24 PROCEDURE — 99232 SBSQ HOSP IP/OBS MODERATE 35: CPT | Performed by: PHYSICAL MEDICINE & REHABILITATION

## 2019-01-24 PROCEDURE — 80048 BASIC METABOLIC PNL TOTAL CA: CPT | Performed by: NURSE PRACTITIONER

## 2019-01-24 RX ORDER — METHOCARBAMOL 500 MG/1
500 TABLET, FILM COATED ORAL EVERY 6 HOURS SCHEDULED
Status: DISCONTINUED | OUTPATIENT
Start: 2019-01-24 | End: 2019-01-29

## 2019-01-24 RX ORDER — OXYCODONE HYDROCHLORIDE 5 MG/1
2.5 TABLET ORAL EVERY 6 HOURS PRN
Status: DISCONTINUED | OUTPATIENT
Start: 2019-01-24 | End: 2019-02-04

## 2019-01-24 RX ORDER — TORSEMIDE 20 MG/1
10 TABLET ORAL DAILY
Status: DISCONTINUED | OUTPATIENT
Start: 2019-01-24 | End: 2019-01-25

## 2019-01-24 RX ORDER — AMLODIPINE BESYLATE 5 MG/1
5 TABLET ORAL DAILY
Status: DISCONTINUED | OUTPATIENT
Start: 2019-01-25 | End: 2019-02-06 | Stop reason: HOSPADM

## 2019-01-24 RX ORDER — LOSARTAN POTASSIUM 50 MG/1
100 TABLET ORAL
Status: DISCONTINUED | OUTPATIENT
Start: 2019-01-25 | End: 2019-01-25

## 2019-01-24 RX ADMIN — GABAPENTIN 600 MG: 300 CAPSULE ORAL at 22:14

## 2019-01-24 RX ADMIN — TROLAMINE SALICYLATE 1 APPLICATION: 10 CREAM TOPICAL at 15:33

## 2019-01-24 RX ADMIN — CLOPIDOGREL BISULFATE 75 MG: 75 TABLET ORAL at 08:09

## 2019-01-24 RX ADMIN — LOSARTAN POTASSIUM 100 MG: 50 TABLET, FILM COATED ORAL at 08:08

## 2019-01-24 RX ADMIN — GABAPENTIN 300 MG: 300 CAPSULE ORAL at 14:42

## 2019-01-24 RX ADMIN — TORSEMIDE 10 MG: 20 TABLET ORAL at 14:41

## 2019-01-24 RX ADMIN — HYDROXYCHLOROQUINE SULFATE 200 MG: 200 TABLET, FILM COATED ORAL at 07:50

## 2019-01-24 RX ADMIN — ACETAMINOPHEN 975 MG: 325 TABLET, FILM COATED ORAL at 14:41

## 2019-01-24 RX ADMIN — METHOCARBAMOL 500 MG: 500 TABLET, FILM COATED ORAL at 05:43

## 2019-01-24 RX ADMIN — DOCUSATE SODIUM 100 MG: 100 CAPSULE, LIQUID FILLED ORAL at 08:09

## 2019-01-24 RX ADMIN — OXYCODONE HYDROCHLORIDE 2.5 MG: 5 TABLET ORAL at 01:33

## 2019-01-24 RX ADMIN — OXYCODONE HYDROCHLORIDE 2.5 MG: 5 TABLET ORAL at 10:16

## 2019-01-24 RX ADMIN — AMLODIPINE BESYLATE 10 MG: 10 TABLET ORAL at 08:08

## 2019-01-24 RX ADMIN — FUROSEMIDE 20 MG: 20 TABLET ORAL at 08:09

## 2019-01-24 RX ADMIN — METOPROLOL TARTRATE 25 MG: 25 TABLET, FILM COATED ORAL at 22:14

## 2019-01-24 RX ADMIN — STANDARDIZED SENNA CONCENTRATE 8.6 MG: 8.6 TABLET ORAL at 22:14

## 2019-01-24 RX ADMIN — METHOCARBAMOL 500 MG: 500 TABLET, FILM COATED ORAL at 17:20

## 2019-01-24 RX ADMIN — CALCIUM CARBONATE 500 MG (1,250 MG)-VITAMIN D3 200 UNIT TABLET 1 TABLET: at 17:20

## 2019-01-24 RX ADMIN — DOCUSATE SODIUM 100 MG: 100 CAPSULE, LIQUID FILLED ORAL at 17:20

## 2019-01-24 RX ADMIN — METHOCARBAMOL 500 MG: 500 TABLET, FILM COATED ORAL at 11:53

## 2019-01-24 RX ADMIN — CALCIUM CARBONATE 500 MG (1,250 MG)-VITAMIN D3 200 UNIT TABLET 1 TABLET: at 07:50

## 2019-01-24 RX ADMIN — NYSTATIN: 100000 POWDER TOPICAL at 17:22

## 2019-01-24 RX ADMIN — OXYCODONE HYDROCHLORIDE 2.5 MG: 5 TABLET ORAL at 05:43

## 2019-01-24 RX ADMIN — ENOXAPARIN SODIUM 40 MG: 40 INJECTION SUBCUTANEOUS at 08:11

## 2019-01-24 RX ADMIN — GABAPENTIN 300 MG: 300 CAPSULE ORAL at 08:09

## 2019-01-24 RX ADMIN — TROLAMINE SALICYLATE 1 APPLICATION: 10 CREAM TOPICAL at 10:21

## 2019-01-24 RX ADMIN — NYSTATIN: 100000 POWDER TOPICAL at 08:12

## 2019-01-24 RX ADMIN — ACETAMINOPHEN 975 MG: 325 TABLET, FILM COATED ORAL at 22:14

## 2019-01-24 RX ADMIN — LIDOCAINE 1 PATCH: 50 PATCH CUTANEOUS at 08:10

## 2019-01-24 RX ADMIN — METOPROLOL TARTRATE 25 MG: 25 TABLET, FILM COATED ORAL at 08:09

## 2019-01-24 RX ADMIN — SERTRALINE HYDROCHLORIDE 25 MG: 25 TABLET ORAL at 22:14

## 2019-01-24 RX ADMIN — OXYCODONE HYDROCHLORIDE 2.5 MG: 5 TABLET ORAL at 17:21

## 2019-01-24 RX ADMIN — LEVOTHYROXINE SODIUM 112 MCG: 112 TABLET ORAL at 05:43

## 2019-01-24 RX ADMIN — OXYCODONE HYDROCHLORIDE 2.5 MG: 5 TABLET ORAL at 22:22

## 2019-01-24 RX ADMIN — ACETAMINOPHEN 975 MG: 325 TABLET, FILM COATED ORAL at 05:43

## 2019-01-24 NOTE — PROGRESS NOTES
01/24/19 1245   Pain Assessment   Pain Assessment 0-10   Pain Score 8   Pain Type Acute pain   Pain Location Head;Neck   Pain Frequency Intermittent   Hospital Pain Intervention(s) Repositioned;Distraction; Emotional support; Environmental changes   Restrictions/Precautions   Precautions Bed/chair alarms; Fall Risk;Spinal precautions   Weight Bearing Restrictions No   Braces or Orthoses C/S Collar   QI: Sit to Stand   Assistance Needed Physical assistance   Assistance Provided by [place] 50%-74%   Comment Pt demos ability to complete sit to stand transfers from mat table with overall Deondre with VC's for proper hand placement  However, from recliner pt requires up to modA to boost from surface  Sit to Stand CARE Score 2   QI: Chair/Bed-to-Chair Transfer   Assistance Needed Physical assistance   Assistance Provided by [place] 50%-74%   Chair/Bed-to-Chair Transfer CARE Score 2   Transfer Bed/Chair/Wheelchair   Limitations Noted In Balance; Endurance;UE Strength;LE Strength   Adaptive Equipment Roller Walker   Findings Pt demos ability to complete sit to stand transfers with overall modA warranted to boost from surfaces  Bed, Chair, Wheelchair Transfer (FIM) 3 - [place] needs to lift, boost or assist to stand OR sit   Health Management   Health Management Level of Assistance Minimal verbal cues; Close supervision   Health Management Pt engaged in simulated medication management task  Pt reports at baseline she used a weekly pill container  into AM/PM slots  Pt reports that she kept her PRN medications in separate area and did not keep them in pill container at baseline  Pt utilizes 4 medications to complete task to simulate home routine with directions of 1 in the AM, 2 tablets a day- 1 AM, 1PM, PRN every 6 hours, and 1 at bed time  Pt demos ability to complete 3/4 medications with 100% accuracy without VC's   Pt does require min VC's initially to identify PRN medication, once prompted pt was able to determine that she would not put that in the container  When asked if she took a medication at 8AM and the dosage was for every 6 hours pt did demo difficulty identifying next appropriate time of day to take that medication  Recommending pt complete medication management with supervision of her daughter initially to ensure accuracy  With pt receptive to recommendations  Cognition   Overall Cognitive Status Impaired   Arousal/Participation Cooperative   Attention Attends with cues to redirect   Orientation Level Oriented X4   Memory Decreased recall of precautions   Following Commands Follows one step commands with increased time or repetition   Activity Tolerance   Activity Tolerance Patient tolerated treatment well   Assessment   Treatment Assessment Pt participated in skilled OT services with focus on sitting balance and postural control, UE ROM, medication management, and functional mobility  Please refer above for details on medication management task  Pt continues to be limited by dec endurance/activity tolerance, pain, dec UE ROM, dec standing tolerance/balance, dec general strength, dec functional cognition, and decreased functional mobility which limits pt's ability to safely and independently complete ADL tasks  Pt engaged in jigsaw puzzle activity while in unsupported sitting at [facility] for ~15 minutes with occasional LUE support to maintain balance warranted  Pt reports "Wow, I used to be so good at doing these things " Pt requires increased time to problem solve positioning of pieces and for sequencing task  Pt requires supported rest break to manage fatigue/pain  Then tolerates an additional 10 minutes at EOM  Pt will continue to benefit from skilled OT services with focus on above mentioned deficits to increase safety and independence with I/ADL tasks  Prognosis Fair   Problem List Decreased strength;Decreased range of motion;Decreased endurance; Impaired balance;Decreased mobility; Decreased coordination;Decreased safety awareness;Pain;Orthopedic restrictions   Plan   Treatment/Interventions ADL retraining;Functional transfer training; Therapeutic exercise;Cognitive reorientation; Endurance training;Equipment eval/education; Compensatory technique education   Progress Progressing toward goals   Recommendation   OT Discharge Recommendation Home with family support   OT Therapy Minutes   OT Time In 1245   OT Time Out 1415   OT Total Time (minutes) 90   OT Mode of treatment - Individual (minutes) 90   OT Mode of treatment - Concurrent (minutes) 0   OT Mode of treatment - Group (minutes) 0   OT Mode of treatment - Co-treat (minutes) 0   OT Mode of Teatment - Total time(minutes) 90 minutes   Therapy Time missed   Time missed?  No

## 2019-01-24 NOTE — PROGRESS NOTES
01/24/19 0830   Pain Assessment   Pain Assessment 0-10   Pain Score 5   Pain Type Chronic pain   Pain Location Head;Shoulder   Pain Orientation Bilateral;Posterior   Restrictions/Precautions   Precautions Bed/chair alarms; Fall Risk;Spinal precautions   ROM Restrictions Yes   Braces or Orthoses C/S Collar   Subjective   Subjective pt reports having pain as subjective c/o    QI: Sit to Stand   Assistance Needed Physical assistance   Assistance Provided by [place] 25%-49%   Sit to Stand CARE Score 3   Bed Mobility   Findings pt in recliner    QI: Chair/Bed-to-Chair Transfer   Assistance Needed Physical assistance   Assistance Provided by [place] 25%-49%   Comment vc's to leaning forward    Chair/Bed-to-Chair Transfer CARE Score 3   Transfer Bed/Chair/Wheelchair   Limitations Noted In Endurance;LE Strength;Balance;Pain Management   Adaptive Equipment Roller [employer], Chair, Wheelchair Transfer (FIM) 3 - [place] needs to lift, boost or assist to stand OR sit   QI: Car Transfer   Assistance Needed Physical assistance   Assistance Provided by [place] 50%-74%   Comment BL LE needs Min/ ModA    Car Transfer CARE Score 2   QI: Walk 50 Feet with Two Turns   Assistance Needed Incidental touching   Walk 50 Feet with Two Turns CARE Score 4   Ambulation   Does the patient walk? 2  Yes   Primary Discharge Mode of Locomotion Walk   Walk Assist Level Close Supervision   Gait Pattern Antalgic; Slow Jenny; Wide JEFE; Improper weight shift   Assist Device Lisa Kerline Leoncio Walked (feet) 55 ft   Limitations Noted In Speed; Endurance;Balance; Safety;Posture   Findings pt slow gait noted pain in Left side of head for shoulder level    Walking (FIM) 2 - Patient ambulates between 50 - 149 feet regardless of assist/device/set up   QI: Wheel 150 Feet   Reason if not Attempted Activity not applicable   Wheel 741 Feet CARE Score 9   Wheelchair mobility   QI: Does the patient use a wheelchair? 0   No   Wheelchair (FIM) 0 - Activity does not occur   QI: 1 Step (Curb)   Assistance Needed Physical assistance   Assistance Provided by [place] 50%-74%   1 Step (Curb) CARE Score 2   Stairs   Type Curb   # of Steps 2   Weight Bearing Precautions Fall Risk   Assist Devices Roller Walker   Stairs (FIM) 1 - Patient goes up and down less than 4 stairs regardless of assist/device/set up   QI: Toilet Transfer   Assistance Needed Physical assistance   Assistance Provided by [place] 50%-74%   Toilet Transfer CARE Score 2   Toilet Transfer   Surface Assessed Standard Toilet   Limitations Noted In Balance; Endurance;LE Strength;Sensation   Adaptive Equipment Grab Bar   Findings Mark    Toilet Transfer (FIM) 3 - [place] needs to lift, boost or assist to stand OR sit   Therapeutic Interventions   Strengthening BL LAQ AP, 10 15 reps    Flexibility B/L LE stretching    Balance B/L LE positioning wide to narrow    Assessment   Treatment Assessment pt progressing toward goals , pt gait cont to be slow  and wide base of support , pt cont with C' collar at this time and repositioned  and Educated on collar pad  pt perform  thex ex's , gait training with RW standing balance w/o RW for support , pt will cont towrd goals    Problem List Decreased mobility; Decreased strength;Decreased endurance;Orthopedic restrictions;Decreased safety awareness   Barriers to Discharge Decreased caregiver support; Inaccessible home environment   PT Barriers   Physical Impairment Decreased endurance;Decreased strength;Orthopedic restrictions;Decreased safety awareness;Decreased mobility   Plan   Treatment/Interventions Therapeutic exercise; Functional transfer training;LE strengthening/ROM; Endurance training;Patient/family training;Gait training   Progress Progressing toward goals   PT Therapy Minutes   PT Time In 0830   PT Time Out 0930   PT Total Time (minutes) 60   PT Mode of treatment - Individual (minutes) 60   PT Mode of treatment - Concurrent (minutes) 0   PT Mode of treatment - Group (minutes) 0 PT Mode of treatment - Co-treat (minutes) 0   PT Mode of Teatment - Total time(minutes) 60 minutes   Therapy Time missed   Time missed?  No

## 2019-01-24 NOTE — CONSULTS
Consultation - Nephrology   Dorothy Rajan 80 y o  female MRN: [id number]  Unit/Bed#: [facility] 973-01 Encounter: [id number]    Inpatient consult to Nephrology  Consult performed by: Jani Walton  Consult ordered by: Marlynn Gilford          History of Present Illness   Physician Requesting Consult: Johanny Trivedi MD  Reason for Consult / Principal Problem:  Hyponatremia  History mildly limited by some forgetfulness    HPI: Dorothy Rajan is a 80y o  year old female with history of diabetes mellitus/hypertension with renal artery disease/hypothyroidism/ambulatory dysfunction/CVA/rheumatoid arthritis/fibromyalgia/aortic valve replacement who presents with a fall having had a wet patch on the floor causing her cane to slipped from under her  CT of the spine revealed a T3 adulthood fracture extending to the right and left superior articular facets left transverse foramen  She also had an acute nondisplaced C6 fracture  Negative CT for acute intracranial process  Now admitted for rehabilitation  We are asked to see her for hyponatremia and hypertension/renal artery disease  She denies any prior history of hyponatremia  Sodium on admission was 137  It did decrease down progressively 230 as of 01/21/2019 and has been hovering at that level between 131-133  She drinks about 6-8 oz glasses of water a day  She has chronic lower extremity edema on furosemide  She has a good appetite denying any significant weight loss  No nausea vomiting or diarrhea  No urinary symptoms including dysuria hematuria voiding symptoms foamy urine  No headaches  Slight productive cough  Patient has a history of left renal artery stent for renal vascular hypertension followed by Dr Tricia Vázquez    History obtained from the patient, and the chart which I completely reviewed        General:  Overall feeling well with good appetite and no weight loss  Cardiovascular:  No chest pain, shortness of breath, chronic leg swelling stable  Respiratory:  No wheezing, mildly productive cough at times related to postnasal drip  Gastrointestinal:  No nausea vomiting or diarrhea and no abdominal pain  Genitourinary:  See HPI  Neuro:  No headaches, dizziness or lightheadedness at this time  Rest of review of systems as completely reviewed with the patient are negative    Historical Information   Past Medical History:   Diagnosis Date    [facility]F [facility]congestive heart failure) ([facility] )     Hypertension     Stroke ([facility] )    ·  Renal artery disease status post left renal artery stent    History reviewed  No pertinent surgical history    Social History   History   Alcohol Use No     History   Drug use: Unknown     History   Smoking Status    Unknown If Ever Smoked   Smokeless Tobacco    Never Used     Family history:  · Both mother and father with hypertension  · No history of kidney disease    Meds/Allergies   all current active meds have been reviewed, current meds: Current Facility-Administered Medications   Medication Dose Route Frequency    acetaminophen (TYLENOL) tablet 975 mg  975 mg Oral Q8H [address]    albuterol (PROVENTIL HFA,VENTOLIN HFA) inhaler 2 puff  2 puff Inhalation Q4H PRN    amLODIPine (NORVASC) tablet 10 mg  10 mg Oral Daily    calcium carbonate-vitamin D (OSCAL-D) 500 mg-200 units per tablet 1 tablet  1 tablet Oral BID With Meals    clopidogrel (PLAVIX) tablet 75 mg  75 mg Oral Daily    docusate sodium (COLACE) capsule 100 mg  100 mg Oral BID    enoxaparin (LOVENOX) subcutaneous injection 40 mg  40 mg Subcutaneous Q24H [facility]    fish oil capsule 1,000 mg  1,000 mg Oral Daily    gabapentin (NEURONTIN) capsule 300 mg  300 mg Oral BID    gabapentin (NEURONTIN) capsule 600 mg  600 mg Oral HS    hydroxychloroquine (PLAQUENIL) tablet 200 mg  200 mg Oral Daily With Breakfast    levothyroxine tablet 112 mcg  112 mcg Oral Early Morning    lidocaine (LIDODERM) 5 % patch 1 patch  1 patch Topical Daily    lidocaine (LIDODERM) 5 % patch 1 patch  1 patch Topical Daily    losartan (COZAAR) tablet 100 mg  100 mg Oral Daily    methocarbamol (ROBAXIN) tablet 500 mg  500 mg Oral Q6H [address]    metoprolol tartrate (LOPRESSOR) tablet 25 mg  25 mg Oral Q12H KARINE    nystatin (MYCOSTATIN) powder   Topical BID    oxyCODONE (ROXICODONE) IR tablet 2 5 mg  2 5 mg Oral Q6H PRN    polyethylene glycol (MIRALAX) packet 17 g  17 g Oral Daily PRN    senna (SENOKOT) tablet 8 6 mg  1 tablet Oral HS    sertraline (ZOLOFT) tablet 25 mg  25 mg Oral Daily    trolamine salicylate (ASPERCREME) 10 % cream 1 application  1 application Topical 4x Daily PRN    and PTA meds:  Prescriptions Prior to Admission   Medication    clopidogrel (PLAVIX) 75 mg tablet    furosemide (LASIX) 20 mg tablet    gabapentin (NEURONTIN) 300 mg capsule    hydroxychloroquine (PLAQUENIL) 200 mg tablet    Levothyroxine Sodium 112 MCG CAPS    lidocaine (LIDODERM) 5 %    losartan (COZAAR) 100 MG tablet    LOSARTAN POTASSIUM-HCTZ PO    metoprolol tartrate (LOPRESSOR) 25 mg tablet    sertraline (ZOLOFT) 25 mg tablet    albuterol (PROVENTIL HFA,VENTOLIN HFA) 90 mcg/act inhaler    amLODIPine (NORVASC) 5 mg tablet    calcium carbonate-vitamin D (OSCAL-D) 500 mg-200 units per tablet    calcium-vitamin D 250-100 MG-UNIT per tablet    HYDROcodone-acetaminophen (NORCO) 5-325 mg per tablet    LORazepam (ATIVAN) 0 5 mg tablet    Multiple Vitamins-Calcium (ONE-A-DAY WOMENS PO)    nitroglycerin (NITROSTAT) 0 4 mg SL tablet    Omega-3 Fatty Acids (FISH OIL) 1,000 mg    potassium chloride (K-DUR,KLOR-CON) 10 mEq tablet    Red Yeast Rice 600 MG CAPS       Allergies   Allergen Reactions    Duloxetine Hcl      Cymbalta;  Hemorrhagic stroke listed as reaction    Anastrozole     Exemestane      Aromasin; Muscle pain & cramps    Lexapro [Escitalopram]      Urinary retention    Lyrica [Pregabalin]      hypertension    Metformin      diarrhea    Statins      Muscle pain & cramps    Tramadol      hypertension  Triprolidine-Pseudoephedrine        Objective     Intake/Output Summary (Last 24 hours) at 01/24/19 1353  Last data filed at 01/24/19 1300   Gross per 24 hour   Intake             1074 ml   Output                0 ml   Net             1074 ml   Patient Vitals for the past 24 hrs:   BP Temp Temp src Pulse Resp SpO2   01/24/19 0626 140/58 98 3 °F (36 8 °C) Oral 55 18 95 %   01/23/19 2022 158/63 97 6 °F (36 4 °C) Oral 59 18 98 %   01/23/19 1420 134/56 97 5 °F (36 4 °C) Oral (!) 54 18 94 %       Invasive Devices:      Vitals:    01/24/19 0626   BP: 140/58   Pulse: 55   Resp: 18   Temp: 98 3 °F (36 8 °C)   SpO2: 95%     General:  Well-developed well-nourished, obese in no acute distress  Skin:  No acute rash  Eyes:  No scleral icterus noninjected  ENT:  Normocephalic/atraumatic, mucous membranes moist  Neck:  Appears Supple but in a Neck color so limited, no jugular venous distention, no carotid bruits, 1+ carotid upstroke and no thyromegaly  Back:  No CVA tenderness  Chest:  Very minimal and expiratory wheezes but no rales or rhonchi appreciated; no dullness to percussion; good respiratory effort no use of accessory muscles  CVS:  Regular rate and rhythm without a murmur rub or gallops appreciable  Abdomen:  Obese, soft and nontender with normal bowel sounds, no hepatosplenomegaly or bruits appreciable  Extremities:  No clubbing, no cyanosis, 1+ lower extremity edema 1/2 the way up the pretibial region with support hose, no femoral bruits  Neuro:  No gross focality but limited in a chair  Psych:  Alert and oriented today    Current Weight: Weight - Scale: 84 1 kg (185 lb 6 5 oz)  First Weight: Weight - Scale: 84 1 kg (185 lb 6 5 oz)    Lab Results:  I have personally reviewed pertinent labs    CBC: Lab Results   Component Value Date    WBC 10 28 (H) 01/24/2019    RBC 3 37 (L) 01/24/2019     CMP: Lab Results   Component Value Date    CL 98 (L) 01/24/2019    CO2 27 01/24/2019    CO2 31 01/09/2019    BUN 39 (H) 01/24/2019    CREATININE 1 20 01/24/2019    GLUCOSE 178 (H) 01/09/2019    CALCIUM 9 1 01/24/2019    AST 35 01/18/2019    ALT 43 01/18/2019    ALKPHOS 62 01/18/2019    EGFR 42 01/24/2019     Phosphorus:   Lab Results   Component Value Date    PHOS 3 2 01/18/2019     Magnesium:   Lab Results   Component Value Date    MG 2 4 01/18/2019     Urinalysis: No results found for: Delfina Perks, SPECGRAV, PHUR, LEUKOCYTESUR, NITRITE, PROTEINUA, GLUCOSEU, KETONESU, BILIRUBINUR, BLOODU  BMP: Lab Results   Component Value Date    GLUCOSE 178 (H) 01/09/2019    SODIUM 131 (L) 01/24/2019    CO2 27 01/24/2019    CO2 31 01/09/2019    BUN 39 (H) 01/24/2019    CREATININE 1 20 01/24/2019    CALCIUM 9 1 01/24/2019     Radiology review:   Chest x-ray from 01/12/2019:  Mild CHF with trace bilateral effusions  CT of the head without acute intracranial abnormality            Assessment/Plan  :  1  Hyponatremia: This is somewhat new  Differential diagnosis would include prerenal in the setting of questionable volume overload, rule out hypothyroidism and adrenal insufficiency  Rule out potential SIADH  Recommendations: Workup:  · Urine for sodium:  56 but that was on furosemide so inconclusive  · Urine for osmolality:  388  · Serum for osmolality:  287 borderline  · TSH:  3 0 acceptable  · A m  Cortisol: To be ordered  · Uric acid:  5 9 more likely related to prerenal  · Recheck chest x-ray at this time regarding volume status  If persistent consideration for possible CT of the chest abdomen pelvis rule out other causes for SIADH  Treatment:  · Fluid restriction 1200 mL per day  · Add torsemide 10 mg daily to help promote water excretion  · Monitor sodium with an attempt to keep closer to normal    2  Hypertension/renal artery disease status post renal artery stent:  Blood pressure appears overall acceptable in some lability  Avoid over treating    I would place hold parameters and decrease amlodipine given edema to 5 mg daily given some blood pressure readings below 386 and low diastolic readings in the 92-94 range  Goal is to maintain blood pressure less than 130/80 but not much lower  Portions of the record may have been created with voice recognition software   Occasional wrong word or "sound a like" substitutions may have occurred due to the inherent limitations of voice recognition software   Read the chart carefully and recognize, using context, where substitutions have occurred

## 2019-01-24 NOTE — SOCIAL WORK
Received return call from pts dtr Tomi Erickson, reviewed team update, barriers to dc and approximate los  Tomi Erickson has already reached out to [employer] members for assist with supervision on dc when family is not available  Tomi Erickson feels pt's mental status has changed with hospitalization and pt doesn't appear as sharp as she was pta  Tomi Dre also stated pt has had some hallucinations in her presence, which she feels may be due to pain meds  Tomi Erickson is off from work on fri/sat/sun and is asking that be taken into consideration for dc so she is able to be with her mom when she returns home

## 2019-01-24 NOTE — PROGRESS NOTES
Internal Medicine Progress Note  Patient: Aamir Brown  Age/sex: 80 y o  female  Medical Record #: [id number]      ASSESSMENT/PLAN:  Aamir Brown is seen and examined and management for following issues:    Type III odontoid fracture with fracture line extending to the right/left superior articular facets and left transverse foramen of C2; nondisplaced fracture through bulky, bridging osteophytes at the C6 level:  required no surgery  Continue collar  Pt reported severe Rt facial pain on 1/19 and an afternoon dose of gabapentin was added  Facial pain resolved      HTN: stable on Norvasc 10mg qd, Cozaar 100mg qd, Lopressor 25mg q12hrs  Hyponatremia:  Worse today = renal to see; will tighten up FR from 1500 to 1200 today and change the diet to regular from 4GM sodium  Will recheck sodium 1/25  Hyponatremia labs sent 1/21      CKD; baseline 1 1-1 3:  Stable     Hx bioAVR 2012: had no significant disease when had card cath before surgery     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 2016:  continue Plavix that she has been on since CVA 2005; with her TIA 2016, neuro kept Plavix; she is intol statins     DM diet controlled:   watching fasting blood sugars  Today was 90     Chronic diastolic CHF, LVEF 57%, mild-mod MR/mild TR and norm function AVR 2016:  on Lasix 20mg qd as at home which I will place on hold until seen by renal   Follows with Dr Endy Bradshaw  Family says LE edema is less than at home     RA/SLE:  continue Plaquenil     HLD: intol to statin; continue Red yeast rice and Fish oil when she goes home     Hypothyroidism:  continue current Levothyroxine     Hx right RA stent: BPs since stent placement is controlled     Depression: Zoloft     Chronic pain/fibromyalgia:  on Hydrocodone at home     Hx breast cancer: follows with Dominique Robert      Subjective:  denies any complaints      ROS:   GI: denies abdominal pain, change bowel habits or reflux symptoms  Neuro: +Right facial pain  Respiratory: No Cough, SOB  Cardiovascular: No CP, palpitations Scheduled Meds:    Current Facility-Administered Medications:  acetaminophen 975 mg Oral [facility] Amarjit Gallo MD   albuterol 2 puff Inhalation Q4H PRN Amarjit Gallo MD   amLODIPine 10 mg Oral Daily Amarjit Gallo MD   calcium carbonate-vitamin D 1 tablet Oral BID With Meals Amarjit Gallo MD   clopidogrel 75 mg Oral Daily Amarjit Gallo MD   docusate sodium 100 mg Oral BID Amarjit Gallo MD   enoxaparin 40 mg Subcutaneous Q24H [address] Amarjit Gallo MD   fish oil 1,000 mg Oral Daily Amarjit Gallo MD   furosemide 20 mg Oral Daily Amarjit Gallo MD   gabapentin 300 mg Oral BID Ewelina Perales PA-C   gabapentin 600 mg Oral HS Amarjit Gallo MD   hydroxychloroquine 200 mg Oral Daily With Breakfast Amarjit Gallo MD   levothyroxine 112 mcg Oral Early Morning Amarjit Gallo MD   lidocaine 1 patch Topical Daily Amarjit Gallo MD   lidocaine 1 patch Topical Daily Amarjit Gallo MD   losartan 100 mg Oral Daily Amarjit Gallo MD   methocarbamol 500 mg Oral Q6H [address] Amarjit Gallo MD   metoprolol tartrate 25 mg Oral Q12H [address] Amarjit Gallo MD   nystatin  Topical BID Amarjit Gallo MD   oxyCODONE 2 5 mg Oral Q6H PRN Amarjit Gallo MD   polyethylene glycol 17 g Oral Daily PRN Amarjit Gallo MD   senna 1 tablet Oral HS Amarjit Gallo MD   sertraline 25 mg Oral Daily Amarjit Gallo MD   trolamine salicylate 1 application Topical 4x Daily PRN Ewelina Perales PA-C       Labs:       Results from last 7 days  Lab Units 01/24/19  0632 01/21/19  1004   WBC Thousand/uL 10 28* 6 69   HEMOGLOBIN g/dL 10 6* 12 1   HEMATOCRIT % 34 0* 36 7   PLATELETS Thousands/uL 206 205       Results from last 7 days  Lab Units 01/24/19  0632 01/22/19  0527   SODIUM mmol/L 131* 133*   POTASSIUM mmol/L 4 7 4 5   CHLORIDE mmol/L 98* 98*   CO2 mmol/L 27 26   BUN mg/dL 39* 34*   CREATININE mg/dL 1 20 1 07   CALCIUM mg/dL 9 1 8 9       Results from last 7 days  Lab Units 01/18/19  0550   HEMOGLOBIN A1C % 5 2 Results from last 7 days  Lab Units 01/18/19  1526 01/18/19  1118 01/18/19  0648   POC GLUCOSE mg/dl 124 115 97     [unfilled]    Labs reviewed    Physical Examination:  Vitals:   Vitals:    01/23/19 0914 01/23/19 1420 01/23/19 2022 01/24/19 0626   BP: 110/65 134/56 158/63 140/58   BP Location:  Left arm Left arm Right arm   Pulse:  (!) 54 59 55   Resp:  18 18 18   Temp:  97 5 °F (36 4 °C) 97 6 °F (36 4 °C) 98 3 °F (36 8 °C)   TempSrc:  Oral Oral Oral   SpO2:  94% 98% 95%   Weight:       Height:           HEENT:  No thrush  RESP: CTAB, no R/R/W; resp unlabored  CV: +S1 S2, regular rate, no rubs/murmurs  ABD: soft, NT, ND, normal BS   EXT: mild edema of LEs L>R  Neuro: AAOx3  [ X ] Total time spent: 30 Mins and greater than 50% of this time was spent counseling/coordinating care  ** Please Note: Dragon 360 Dictation voice to text software may have been used in the creation of this document   **

## 2019-01-24 NOTE — PROGRESS NOTES
01/24/19 1430   Pain Assessment   Pain Assessment 0-10   Pain Score 7   Pain Type Acute pain   Restrictions/Precautions   Precautions Bed/chair alarms; Fall Risk;Spinal precautions   Braces or Orthoses C/S Collar   Subjective   Subjective pt reports cont with pain as above    QI: Sit to Stand   Assistance Needed Physical assistance   Assistance Provided by [place] 25%-49%   Sit to Stand CARE Score 3   QI: Chair/Bed-to-Chair Transfer   Assistance Needed Physical assistance   Assistance Provided by [place] 25%-49%   Chair/Bed-to-Chair Transfer CARE Score 3   Transfer Bed/Chair/Wheelchair   Limitations Noted In Endurance;LE Strength;Pain Management   Adaptive Equipment Roller Walker   Sit to [employer] Guard;Minimal Assist   Stand to Sit Contact Guard   Findings pt cont to make slow progress toward    Bed, Chair, Wheelchair Transfer (FIM) 3 - [place] needs to lift, boost or assist to stand OR sit   QI: Walk 50 Feet with Two Turns   Assistance Needed Incidental touching   Assistance Provided by [place] 25%-49%   Walk 50 Feet with Two Turns CARE Score 3   Ambulation   Does the patient walk? 2  Yes   Primary Discharge Mode of Locomotion Walk   Walk Assist Level Close Supervision   Gait Pattern Antalgic; Slow Jenny; Wide JEFE; Improper weight shift   Assist Device Lilianaer Kerline Fang Walked (feet) 55 ft   Limitations Noted In Endurance;Balance;Posture; Safety;Strength   Walking (FIM) 2 - Patient ambulates between 50 - 149 feet regardless of assist/device/set up   Wheelchair mobility   Wheelchair (FIM) 0 - Activity does not occur   Toilet Transfer   Toilet Transfer (FIM) 0 - Activity does not occur   Therapeutic Interventions   Strengthening LAQ X20 AP x20    Balance standing balance    Equipment Use   NuStep 10 min level 1   Assessment   Treatment Assessment Pt progressing toward goals , perform thex ex;s functional activities , cont to need conditioning , LE strengthening      Plan   Treatment/Interventions Therapeutic exercise;LE strengthening/ROM; Functional transfer training;Gait training; Endurance training   Progress Progressing toward goals   PT Therapy Minutes   PT Time In 1430   PT Time Out 1500   PT Total Time (minutes) 30   PT Mode of treatment - Individual (minutes) 30   PT Mode of treatment - Concurrent (minutes) 0   PT Mode of treatment - Group (minutes) 0   PT Mode of treatment - Co-treat (minutes) 0   PT Mode of Teatment - Total time(minutes) 30 minutes   Therapy Time missed   Time missed?  No

## 2019-01-24 NOTE — PROGRESS NOTES
Physical Medicine and Rehabilitation Progress Note  Haydee Mary 80 y o  female MRN: [id number]  Unit/Bed#: -01 Encounter: [id number]    HPI: Nneka Banks a 80 y  o  female who presented to the [facility] as a trauma following a fall on Plavix  She utilizes a SPC at baseline, and had hit a wet patch on the floor causing the cane to slip from under her  Her PMH is significant for HTN with history of CVA, AVR, HTN, and T2DM  CT C-Spine revealed a Type 3 Odontoid fracture extending to the R and L superior articular facets and L transverse foramen  She also had an acute non-displaced C6 fracture  Treated non-op  Course c/b by fluid overload, hospital delirium, and  Pain  These are improving  She was evaluated by PT/OT and determined to be appropriate for discharge to the [facility] on 1/17/19  Chief Complaint: Pain is localized to C2 level on the left  Interval: Patient s/e at therapy  Slept better yesterday  Having difficulty with C-collar fit as pediatric collar is too small, and this collar is too big  She denies any pressure areas or discomfort in terms of skin  She denies any new weakness/numbness/tingling  She denies any CP, SOB  She feels the Robaxin helps with her pain more than the oxycodone, but has difficulty figuring out when to order it  She does not feel the pain constantly  Had a better sleep last night  NO other acute events  ROS:  Negative except for what is noted in HPI/CC/Sbuj    Assessment/Plan:      * Ambulatory dysfunction   Assessment & Plan    Patient will participate in a comprehensive inpatient rehab program with 3-5 hours a day 5-7 days a week of PT/OT  To evaluate for any further SLP needs    - Continue pain management  - Fall precautions     C6 cervical fracture (HCC)   Assessment & Plan    Non-op management  - Pain control as noted above in Type III odontoid fracture  - PT/OT     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)   Assessment & Plan    Please see ambulatory dysfunction  C-Collar ATC  Current collar keeps her stable, but does seem a little too big, unfortunately Pediatric collar doesn't fit  Seen by orthotics here with minimal improvement  - Pain management - goal to wean off of opioids  Emphasized today  - Will make Robaxin scheduled  - Decreased frequency of oxycodone 2 5mg to Q6hr  - Discontinued breakthrough  - Continue Tylenol ATC  - Lidocaine patch  - Cervical precautions  - Will need follow-up with Neurosurgery on 1/28/19    - The pain she is having may have a myofascial component  Hyponatremia   Assessment & Plan    1/24 Na 131 with fluid restriction and liberalizing salt intake with diet  History of aortic valve replacement with bioprosthetic valve   Assessment & Plan    Monitor cardiopulmonary status  IM to manage     Fibromyalgia   Assessment & Plan    Continue sertraline   - Would strongly consider a switch to Duloxetine or another SNRI as an outpatient as this is approved for chronic pain syndromes, and may help with her central sensitization from her Fibromyalgia  Continue gabapentin (Renally dosed)  Consult neuropsychology for adjustment and coping skills for pain management  Rheumatoid arthritis involving multiple sites [facility])   Assessment & Plan    Continue plaquenil  - Was also on hydrocodone but titrating off at home  - Monitor for flare  - IM following     Chronic kidney disease   Assessment & Plan    Crea stable  Depression   Assessment & Plan    Continue sertraline    - Would strongly consider a switch to Duloxetine or another SNRI as an outpatient as this is approved for chronic pain syndromes, and may help with her central sensitization from her Fibromyalgia  Type 2 diabetes mellitus with diabetic polyneuropathy [facility])   Assessment & Plan    Lab Results   Component Value Date    HGBA1C 5 2 01/18/2019       No results for input(s): POCGLU in the last 72 hours      Blood Sugar Average: Last 72 hrs:  - Monitor fasting glucose  - Continue diabetic diet  - At home diet controlled  - Placed on diabetic diet  - IM to manage     History of CVA (cerebrovascular accident)   Assessment & Plan    No tim sequelae  Cannot tolerate statins  Continue Plavix  Continue fish oil  At home on red yeast rice as well  Chronic diastolic heart failure (HCC)   Assessment & Plan    With some volume overload early in hospital stay  Now examines euvolemic  Monitor kidney function on lasix   Continue ARB/BB     Renovascular hypertension   Assessment & Plan    Continue increased Amlodipine 10mg  Continues home Losartan 100mg  Continue metoprolol tartrate 25mg Q12  Change hydralazine PRN to PO  S/p stenting for DONAVAN  IM managing, recs appreciated       # Skin  · Encourage regular turning as patient at risk for skin breakdown  · Staff to continue patient education on Q2h turning     # Bowel  · Patient reports no constipation  · last BM 1/23/19  Continu regimen of Colace, senna, and miralax       # Bladder  · Patient voiding spontaneously    # Pain  · Continue tylenol, for max of 3gm daily  · Continue oxycodone   · Continue methocarbamol  · Continue gabapentin    # Rehab Psych   · referral to Rehabilitation Psychology    # Other  - Diet/Nutrition:        Diet Orders            Start     Ordered    01/21/19 1337  Diet Cardiovascular; Sodium 4 Gm (KARMA); Fluid Restriction 1500 ML  Diet effective now     Question Answer Comment   Diet Type Cardiovascular    Cardiac Sodium 4 Gm (KARMA)    Other Restriction(s): Fluid Restriction 1500 ML    RD to adjust diet per protocol?  Yes        01/21/19 1336    01/18/19 0721  Room Service  Once     Question:  Type of Service  Answer:  Room Service - Appropriate with Assistance    01/18/19 0720    01/17/19 1654  Dietary nutrition supplements  Once     Question Answer Comment   Select Supplement: Ensure Enlive-Vanilla    Frequency Lunch        01/17/19 1653        - DVT prophy: Sequential compression device (Venodyne)  and Enoxaparin (Lovenox)  - GI ppx: None  - Nausea: None  - Supplements: None  - Sleep: None    Disposition: Team 1/23/19: UE ROM/functional strength are barriers  She may have some cognitive deficits at baseline  OT to check MOCA today  Also having issues with the fit of her Estefani collar for showers  Anticipate that her length of stay may be about 2-3 weeks  Will reteam next week  CODE: Level 1: Full Code   Scheduled Meds:    Current Facility-Administered Medications:  acetaminophen 975 mg Oral [facility] Matthew Erps, MD   albuterol 2 puff Inhalation Q4H PRN MARVINula Erps, MD   amLODIPine 10 mg Oral Daily Zula Erps, MD   calcium carbonate-vitamin D 1 tablet Oral BID With Meals Zula Erps, MD   clopidogrel 75 mg Oral Daily Zula Erps, MD   docusate sodium 100 mg Oral BID Zula Erps, MD   enoxaparin 40 mg Subcutaneous Q24H [facility] MARVINula Erps, MD   fish oil 1,000 mg Oral Daily Zula Erps, MD   furosemide 20 mg Oral Daily Zula Erps, MD   gabapentin 300 mg Oral BID Ewelina Perales PA-C   gabapentin 600 mg Oral HS Zula Erps, MD   hydroxychloroquine 200 mg Oral Daily With Breakfast Zula Erps, MD   levothyroxine 112 mcg Oral Early Morning Zula Erps, MD   lidocaine 1 patch Topical Daily Zula Erps, MD   lidocaine 1 patch Topical Daily Zula Erps, MD   losartan 100 mg Oral Daily Zula Erps, MD   methocarbamol 500 mg Oral Q6H [facility] Zula Erps, MD   metoprolol tartrate 25 mg Oral Q12H [facility] Zula Erps, MD   nystatin  Topical BID Zula Erps, MD   oxyCODONE 2 5 mg Oral Q6H PRN Zula Erps, MD   polyethylene glycol 17 g Oral Daily PRN Zula Erps, MD   senna 1 tablet Oral HS Zula Erps, MD   sertraline 25 mg Oral Daily Zula Erps, MD   trolamine salicylate 1 application Topical 4x Daily PRN Ewelina Perales PA-C        Objective:    Functional Update: Pending updates today     1/23/19 OT: Eating - supervision, Deondre grooming, modA bathing, Total A UB dressing, MaxA LB dressing, maxA toileting, modA tub/shower transfer, modA toilet transfer  1/23/19 PT: modA transfers, CGA-Deondre ambulation 25' with RW, modA for stairs, Deondre ramp  Allergies per EMR    Physical Exam:  Temp:  [97 5 °F (36 4 °C)-98 3 °F (36 8 °C)] 98 3 °F (36 8 °C)  HR:  [54-59] 55  Resp:  [18] 18  BP: (134-158)/(56-63) 140/58  SpO2:  [94 %-98 %] 95 %    General: alert, no apparent distress, cooperative and comfortable  HEENT:  Head: Normocephalic, no lesions, without obvious abnormality  C-collar in place  CARDIAC:  regular rate and rhythm, S1, S2 normal, no murmur, click, rub or gallop  LUNGS:  normal air entry, lungs clear to auscultation  ABDOMEN:  soft, non-tender  Bowel sounds normal  No masses, no organomegaly  EXTREMITIES:  NOw wearing TEDs  NEURO:   normal without focal findings, mental status, speech normal, alert and oriented x3 and Strenght in UE intact 5/5, able to demonstrate >3/5 throughout LE    PSYCH:  Normal  and Alert and oriented, appropriate affect    INCISION:  None     Diagnostic Studies: Reviewed, no new imaging  No orders to display       Laboratory: Reviewed    Results from last 7 days  Lab Units 01/24/19  0632 01/21/19  1004 01/18/19  0550   HEMOGLOBIN g/dL 10 6* 12 1 10 9*   HEMATOCRIT % 34 0* 36 7 34 0*   WBC Thousand/uL 10 28* 6 69 6 95       Results from last 7 days  Lab Units 01/24/19  0632 01/22/19  0527 01/21/19  1004 01/18/19  0550   BUN mg/dL 39* 34* 30* 31*   SODIUM mmol/L 131* 133* 130* 134*   POTASSIUM mmol/L 4 7 4 5 4 1 3 7   CHLORIDE mmol/L 98* 98* 98* 98*   CREATININE mg/dL 1 20 1 07 0 99 1 14   AST U/L  --   --   --  35   ALT U/L  --   --   --  43            Patient Active Problem List   Diagnosis    Closed nondisplaced fracture of second cervical vertebra (HCC)    Neck pain, acute    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)    C6 cervical fracture (HCC)    Hypertension    Hypothyroidism    Fall    Forgetfulness    Ambulatory dysfunction    Physical deconditioning    Acute pain    Delirium    Renovascular hypertension    Chronic diastolic heart failure (HCC)    History of CVA (cerebrovascular accident)    Type 2 diabetes mellitus with diabetic polyneuropathy ([facility])    Depression    Chronic kidney disease    [facility]    Fibromyalgia    History of aortic valve replacement with bioprosthetic valve    Hyponatremia       ** Please Note: Flu[employer] Direct voice to text software may have been used in the creation of this document  **    Total visit time:  At least 25 minutes, with more than 50% spent counseling/coordinating care

## 2019-01-25 ENCOUNTER — APPOINTMENT (INPATIENT)
Dept: RADIOLOGY | Facility: HOSPITAL | Age: 84
DRG: 560 | End: 2019-01-25
Payer: COMMERCIAL

## 2019-01-25 PROBLEM — N17.9 ACUTE RENAL FAILURE SUPERIMPOSED ON CHRONIC KIDNEY DISEASE (HCC): Status: ACTIVE | Noted: 2019-01-17

## 2019-01-25 LAB
ANION GAP SERPL CALCULATED.3IONS-SCNC: 7 MMOL/L (ref 4–13)
BACTERIA UR QL AUTO: ABNORMAL /HPF
BILIRUB UR QL STRIP: NEGATIVE
BUN SERPL-MCNC: 42 MG/DL (ref 5–25)
CALCIUM SERPL-MCNC: 9 MG/DL (ref 8.3–10.1)
CHLORIDE SERPL-SCNC: 96 MMOL/L (ref 100–108)
CLARITY UR: CLEAR
CO2 SERPL-SCNC: 29 MMOL/L (ref 21–32)
COLOR UR: YELLOW
CORTIS SERPL-MCNC: 18.8 UG/DL
CREAT SERPL-MCNC: 1.4 MG/DL (ref 0.6–1.3)
GFR SERPL CREATININE-BSD FRML MDRD: 35 ML/MIN/1.73SQ M
GLUCOSE SERPL-MCNC: 80 MG/DL (ref 65–140)
GLUCOSE UR STRIP-MCNC: NEGATIVE MG/DL
HGB UR QL STRIP.AUTO: NEGATIVE
HYALINE CASTS #/AREA URNS LPF: ABNORMAL /LPF
KETONES UR STRIP-MCNC: NEGATIVE MG/DL
LEUKOCYTE ESTERASE UR QL STRIP: ABNORMAL
MAGNESIUM SERPL-MCNC: 2.6 MG/DL (ref 1.6–2.6)
NITRITE UR QL STRIP: NEGATIVE
NON-SQ EPI CELLS URNS QL MICRO: ABNORMAL /HPF
PH UR STRIP.AUTO: 6 [PH] (ref 4.5–8)
POTASSIUM SERPL-SCNC: 4.5 MMOL/L (ref 3.5–5.3)
PROT UR STRIP-MCNC: NEGATIVE MG/DL
RBC #/AREA URNS AUTO: ABNORMAL /HPF
SODIUM SERPL-SCNC: 132 MMOL/L (ref 136–145)
SP GR UR STRIP.AUTO: 1.01 (ref 1–1.03)
UROBILINOGEN UR QL STRIP.AUTO: 0.2 E.U./DL
WBC #/AREA URNS AUTO: ABNORMAL /HPF

## 2019-01-25 PROCEDURE — 97530 THERAPEUTIC ACTIVITIES: CPT

## 2019-01-25 PROCEDURE — 83735 ASSAY OF MAGNESIUM: CPT | Performed by: INTERNAL MEDICINE

## 2019-01-25 PROCEDURE — 71250 CT THORAX DX C-: CPT

## 2019-01-25 PROCEDURE — 97535 SELF CARE MNGMENT TRAINING: CPT

## 2019-01-25 PROCEDURE — 99232 SBSQ HOSP IP/OBS MODERATE 35: CPT | Performed by: PHYSICAL MEDICINE & REHABILITATION

## 2019-01-25 PROCEDURE — 70450 CT HEAD/BRAIN W/O DYE: CPT

## 2019-01-25 PROCEDURE — 74176 CT ABD & PELVIS W/O CONTRAST: CPT

## 2019-01-25 PROCEDURE — 80048 BASIC METABOLIC PNL TOTAL CA: CPT | Performed by: NURSE PRACTITIONER

## 2019-01-25 PROCEDURE — 82533 TOTAL CORTISOL: CPT | Performed by: INTERNAL MEDICINE

## 2019-01-25 PROCEDURE — 97110 THERAPEUTIC EXERCISES: CPT

## 2019-01-25 PROCEDURE — 99232 SBSQ HOSP IP/OBS MODERATE 35: CPT | Performed by: INTERNAL MEDICINE

## 2019-01-25 PROCEDURE — 81001 URINALYSIS AUTO W/SCOPE: CPT | Performed by: INTERNAL MEDICINE

## 2019-01-25 PROCEDURE — 97116 GAIT TRAINING THERAPY: CPT

## 2019-01-25 RX ORDER — LOSARTAN POTASSIUM 50 MG/1
50 TABLET ORAL
Status: DISCONTINUED | OUTPATIENT
Start: 2019-01-25 | End: 2019-02-06 | Stop reason: HOSPADM

## 2019-01-25 RX ADMIN — LOSARTAN POTASSIUM 50 MG: 50 TABLET, FILM COATED ORAL at 21:36

## 2019-01-25 RX ADMIN — DOCUSATE SODIUM 100 MG: 100 CAPSULE, LIQUID FILLED ORAL at 08:52

## 2019-01-25 RX ADMIN — ACETAMINOPHEN 975 MG: 325 TABLET, FILM COATED ORAL at 21:37

## 2019-01-25 RX ADMIN — LIDOCAINE 1 PATCH: 50 PATCH CUTANEOUS at 08:55

## 2019-01-25 RX ADMIN — DOCUSATE SODIUM 100 MG: 100 CAPSULE, LIQUID FILLED ORAL at 17:57

## 2019-01-25 RX ADMIN — OXYCODONE HYDROCHLORIDE 2.5 MG: 5 TABLET ORAL at 05:56

## 2019-01-25 RX ADMIN — GABAPENTIN 300 MG: 300 CAPSULE ORAL at 15:56

## 2019-01-25 RX ADMIN — METHOCARBAMOL 500 MG: 500 TABLET, FILM COATED ORAL at 05:57

## 2019-01-25 RX ADMIN — TORSEMIDE 10 MG: 20 TABLET ORAL at 08:53

## 2019-01-25 RX ADMIN — METOPROLOL TARTRATE 25 MG: 25 TABLET, FILM COATED ORAL at 08:53

## 2019-01-25 RX ADMIN — CALCIUM CARBONATE 500 MG (1,250 MG)-VITAMIN D3 200 UNIT TABLET 1 TABLET: at 08:52

## 2019-01-25 RX ADMIN — LEVOTHYROXINE SODIUM 112 MCG: 112 TABLET ORAL at 05:57

## 2019-01-25 RX ADMIN — SERTRALINE HYDROCHLORIDE 25 MG: 25 TABLET ORAL at 21:37

## 2019-01-25 RX ADMIN — METHOCARBAMOL 500 MG: 500 TABLET, FILM COATED ORAL at 17:55

## 2019-01-25 RX ADMIN — Medication 1000 MG: at 08:55

## 2019-01-25 RX ADMIN — NYSTATIN: 100000 POWDER TOPICAL at 08:55

## 2019-01-25 RX ADMIN — METOPROLOL TARTRATE 25 MG: 25 TABLET, FILM COATED ORAL at 20:30

## 2019-01-25 RX ADMIN — ACETAMINOPHEN 975 MG: 325 TABLET, FILM COATED ORAL at 05:57

## 2019-01-25 RX ADMIN — OXYCODONE HYDROCHLORIDE 2.5 MG: 5 TABLET ORAL at 20:31

## 2019-01-25 RX ADMIN — CLOPIDOGREL BISULFATE 75 MG: 75 TABLET ORAL at 08:52

## 2019-01-25 RX ADMIN — ACETAMINOPHEN 975 MG: 325 TABLET, FILM COATED ORAL at 14:00

## 2019-01-25 RX ADMIN — OXYCODONE HYDROCHLORIDE 2.5 MG: 5 TABLET ORAL at 02:05

## 2019-01-25 RX ADMIN — GABAPENTIN 300 MG: 300 CAPSULE ORAL at 08:54

## 2019-01-25 RX ADMIN — STANDARDIZED SENNA CONCENTRATE 8.6 MG: 8.6 TABLET ORAL at 21:37

## 2019-01-25 RX ADMIN — ENOXAPARIN SODIUM 40 MG: 40 INJECTION SUBCUTANEOUS at 08:55

## 2019-01-25 RX ADMIN — NYSTATIN: 100000 POWDER TOPICAL at 17:55

## 2019-01-25 RX ADMIN — LIDOCAINE 1 PATCH: 50 PATCH CUTANEOUS at 08:56

## 2019-01-25 RX ADMIN — CALCIUM CARBONATE 500 MG (1,250 MG)-VITAMIN D3 200 UNIT TABLET 1 TABLET: at 15:56

## 2019-01-25 RX ADMIN — GABAPENTIN 600 MG: 300 CAPSULE ORAL at 21:36

## 2019-01-25 RX ADMIN — METHOCARBAMOL 500 MG: 500 TABLET, FILM COATED ORAL at 11:57

## 2019-01-25 RX ADMIN — METHOCARBAMOL 500 MG: 500 TABLET, FILM COATED ORAL at 00:22

## 2019-01-25 RX ADMIN — HYDROXYCHLOROQUINE SULFATE 200 MG: 200 TABLET, FILM COATED ORAL at 08:55

## 2019-01-25 NOTE — SOCIAL WORK
Received mssg from Pineda Bradford at [facility] approving additional 7 days with lcd 1/29 and update due 1/30

## 2019-01-25 NOTE — PROGRESS NOTES
Progress Note - Nephrology   Mojgan Solano 80 y o  female MRN: [id number]  Unit/Bed#: -01 Encounter: [id number]    ASSESSMENT AND PLAN:  58-year-old female with history of diabetes mellitus/hypertension/renal artery disease/hypothyroidism/ambulatory dysfunction/CVA/rheumatoid arthritis/fibromyalgia/aortic valve replacement who presents with a fall slipping  She has a T3 fracture extending to the right and left superior articular facets and left transfers foramen  Also an acute nondisplaced C6 fracture  She is admitted for rehabilitation  We are asked to follow her for hyponatremia:    1  Hyponatremia: This is somewhat new  Differential diagnosis would include prerenal in the setting of questionable volume overload  Rule out potential SIADH  Serum sodium was 130 at the [place] on 01/21/2019  Current sodium is 132 slightly better  Recommendations: Workup:  · Urine for sodium:  56 but that was on furosemide so inconclusive  · Urine for osmolality:  388  · Serum for osmolality:  287 borderline  · TSH:  3 0 acceptable  · A m  Cortisol:  17 8 therefore negative adrenal insufficiency  · Uric acid:  5 9 more likely related to prerenal  · Chest x-ray:  No acute disease no evidence of CHF    Treatment:  · Fluid restriction 1200 mL per day  · Torsemide 10 mg daily to help promote water excretion  · Monitor sodium with an attempt to keep closer to normal  · For completeness I would perform the CT of the chest abdomen and pelvis to rule out other causes of SIADH with oral contrast only  · IF THE SODIUM IS NO BETTER TOMORROW CONSIDERATION FOR SMALL DOSE OF SODIUM CHLORIDE 1 G T I D      2  Hypertension/renal artery disease status post renal artery stent:  Blood pressure appears overall acceptable in some lability  Avoid over treating  · I just decreased amlodipine 5 mg daily    Further adjustments and possibly lowering medications if persistently low normal  · Goal is to maintain blood pressure less than 130/80 but not much lower  3  CKD stage 3/mild HERLINDA:  Typical creatinine is closer to 1 0  Creatinine slightly higher today 1 4  Most likely slightly prerenal/decrease in blood pressure on losartan     I would decrease losartan hold torsemide today  Potential re-initiation at torsemide in a m     I would do a simple workup as well  Recommendations:  · UA  · Urine protein creatinine ratio  · CT ordered of the abdomen and pelvis which should evaluate her kidneys ruling out hydronephrosis  · PVR with bladder scans  · Hold torsemide  · Decrease losartan 50 a day  · Avoid hypoperfusion        Subjective:   Patient overall feels well  Only complains of a postnasal drip leading to a cough  She denies any chest pain or shortness of Breath  Urinating well  No nausea vomiting or diarrhea  Objective:     Vitals: Blood pressure 126/60, pulse 55, temperature 98 °F (36 7 °C), temperature source Oral, resp  rate 18, height 4' 11" (1 499 m), weight 84 1 kg (185 lb 6 5 oz), SpO2 96 %  ,Body mass index is 37 45 kg/m²      Weight (last 2 days)     None            Intake/Output Summary (Last 24 hours) at 01/25/19 0930  Last data filed at 01/25/19 0900   Gross per 24 hour   Intake              474 ml   Output                0 ml   Net              474 ml            Physical Exam: General:  No acute distress sitting out of bed  Skin:  No acute rash  Eyes:  No scleral icterus  ENT:  Moist mucous membranes  Neck:  No jugular venous distention; cervical neck collar in place  Chest:  Clear to auscultation  CVS:  No rubs or gallops appreciable  Abdomen:  Obese, soft and nontender with normal bowel sounds  Extremities:  Trace lower extremity edema bilaterally  Neuro:  No gross focality  Psych:  Alert and oriented                Medications:    Scheduled Meds:  Current Facility-Administered Medications:  acetaminophen 975 mg Oral Q8H [facility] Rogelio Espinoza MD   albuterol 2 puff Inhalation Q4H PRN Rogelio Espinoza MD   amLODIPine 5 mg Oral Daily Bethany Marshall MD   calcium carbonate-vitamin D 1 tablet Oral BID With Meals Anthony Steele MD   clopidogrel 75 mg Oral Daily Anthony Steele MD   docusate sodium 100 mg Oral BID Anthony Steele MD   enoxaparin 40 mg Subcutaneous Q24H [address] Anthony Steele MD   fish oil 1,000 mg Oral Daily Anthony Steele MD   gabapentin 300 mg Oral BID Ewelina Perales PA-C   gabapentin 600 mg Oral HS Anthony Steele MD   hydroxychloroquine 200 mg Oral Daily With Breakfast Anthony Steele MD   levothyroxine 112 mcg Oral Early Morning Anthony Steele MD   lidocaine 1 patch Topical Daily Anthony Steele MD   lidocaine 1 patch Topical Daily Anthony Steele MD   losartan 100 mg Oral HS Bethany Marshall MD   methocarbamol 500 mg Oral Q6H [address] Anthony Steele MD   metoprolol tartrate 25 mg Oral Q12H [address] Anthony Steele MD   nystatin  Topical BID Anthony Steele MD   oxyCODONE 2 5 mg Oral Q6H PRN Anthony Steele MD   polyethylene glycol 17 g Oral Daily PRN Anthony Steele MD   senna 1 tablet Oral HS Anthony Steele MD   sertraline 25 mg Oral Daily Anthony Steele MD   torsemide 10 mg Oral Daily Bethany Marshall MD   trolamine salicylate 1 application Topical 4x Daily PRN Ewelina Perales PA-C       PRN Meds: albuterol    oxyCODONE    polyethylene glycol    trolamine salicylate    Continuous Infusions:     Lab, Imaging and other studies: I have personally reviewed pertinent labs    Laboratory Results:    Results from last 7 days  Lab Units 01/25/19  0504 01/24/19  0632 01/22/19  0527 01/21/19  1004   WBC Thousand/uL  --  10 28*  --  6 69   HEMOGLOBIN g/dL  --  10 6*  --  12 1   HEMATOCRIT %  --  34 0*  --  36 7   PLATELETS Thousands/uL  --  206  --  205   POTASSIUM mmol/L 4 5 4 7 4 5 4 1   CHLORIDE mmol/L 96* 98* 98* 98*   CO2 mmol/L 29 27 26 27   BUN mg/dL 42* 39* 34* 30*   CREATININE mg/dL 1 40* 1 20 1 07 0 99   CALCIUM mg/dL 9 0 9 1 8 9 9 1   MAGNESIUM mg/dL 2 6  --   --   --      Urinalysis: No results found for: COLORU, CLARITYU, Leslie Corley, LEUKOCYTESUR, NITRITE, PROTEINUA, GLUCOSEU, KETONESU, BILIRUBINUR, BLOODU  ABGs: No results found for: [facility]  Radiology review:     Portions of the record may have been created with voice recognition software   Occasional wrong word or "sound a like" substitutions may have occurred due to the inherent limitations of voice recognition software   Read the chart carefully and recognize, using context, where substitutions have occurred

## 2019-01-25 NOTE — PROGRESS NOTES
Physical Medicine and Rehabilitation Progress Note  Jennie Rodriguez 80 y o  female MRN: [id number]  Unit/Bed#: -01 Encounter: [id number]    HPI: Dwayne Rdz 80 y  o  female who presented to the [facility] as a trauma following a fall on Plavix  She utilizes a SPC at baseline, and had hit a wet patch on the floor causing the cane to slip from under her  Her PMH is significant for HTN with history of CVA, AVR, HTN, and T2DM  CT C-Spine revealed a Type 3 Odontoid fracture extending to the R and L superior articular facets and L transverse foramen  She also had an acute non-displaced C6 fracture  Treated non-op  Course c/b by fluid overload, hospital delirium, and  Pain  These are improving  She was evaluated by PT/OT and determined to be appropriate for discharge to the 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, as well as her daughter/grandchildren  She states that yesterday, she told her daughter she was having strange dreams at night of people coming into her room, but that she didn't think it was actually happening  She denies any visual or auditory hallucinations and is feeling well today  She feels her pain is much better controlled on the Robaxin, and is fine with decreasing her oxycodone use  She has only used it once so far  She has been having bowel movements daily  She denies lightheadedness, dizziness  She denies difficulty speaking/swallowing, new numbness/tingling/weakness  She is in good spirits today  ROS:  Negative except for what is noted in HPI/CC/Sbuj    Assessment/Plan:      * Ambulatory dysfunction   Assessment & Plan    Patient will participate in a comprehensive inpatient rehab program with 3-5 hours a day 5-7 days a week of PT/OT  To evaluate for any further SLP needs    - Continue pain management  - Fall precautions     C6 cervical fracture (HCC)   Assessment & Plan    Non-op management  - Pain control as noted above in Type III odontoid fracture  - PT/OT     Type III fracture of odontoid process [facility])   Assessment & Plan    Please see ambulatory dysfunction  C-Collar ATC  Current collar keeps her stable, but does seem a little too big, unfortunately Pediatric collar doesn't fit  Seen by orthotics here with minimal improvement  - Pain management - goal to wean off of opioids  Emphasized today  - Will make Robaxin scheduled  - Decreased frequency of oxycodone 2 5mg to Q6hr  - Discontinued breakthrough  - Continue Tylenol ATC  - Lidocaine patch  - Cervical precautions  - Will need follow-up with Neurosurgery on 1/28/19    - The pain she is having may have a myofascial component  Hyponatremia   Assessment & Plan    1/25 Na 132 from 131  Continue to check BMP daily  - 1200 mL fluid restriction  - May need to start sodium chloride tabs if no improvement  - CTCAP to rule out other causes  - Remainder of work-up unrevealing  Nephro recs appreciated  History of aortic valve replacement with bioprosthetic valve   Assessment & Plan    Monitor cardiopulmonary status  IM to manage     Fibromyalgia   Assessment & Plan    Continue sertraline   - Would strongly consider a switch to Duloxetine or another SNRI as an outpatient as this is approved for chronic pain syndromes, and may help with her central sensitization from her Fibromyalgia  Continue gabapentin (Renally dosed)  Consult neuropsychology for adjustment and coping skills for pain management  Rheumatoid arthritis involving multiple sites [facility])   Assessment & Plan    Continue plaquenil  - Was also on hydrocodone but titrating off at home  - Monitor for flare  - IM following     Acute renal failure superimposed on chronic kidney disease ([facility] [address] )   Assessment & Plan    Creatinine increased to 1 4   Of note, bladder scans have been <150cc previously  Per nephro, may be due to decreased BP, and likely pre-renal  - Hold torsemide one day  - Losartan decreased   - Current dose of gabapentin acceptable given kidney disease   - Follow-up work-up per nephro   · UA  · Urine protein creatinine ratio  · CT ordered of the abdomen and pelvis which should evaluate her kidneys ruling out hydronephrosis  · Avoid hypoperfusion        Depression   Assessment & Plan    Continue sertraline    - Would strongly consider a switch to Duloxetine or another SNRI as an outpatient as this is approved for chronic pain syndromes, and may help with her central sensitization from her Fibromyalgia  Type 2 diabetes mellitus with diabetic polyneuropathy [facility])   Assessment & Plan    Lab Results   Component Value Date    HGBA1C 5 2 01/18/2019       No results for input(s): POCGLU in the last 72 hours  Blood Sugar Average: Last 72 hrs:  - Monitor fasting glucose  - Continue diabetic diet  - At home diet controlled  - Placed on diabetic diet  - IM to manage     History of CVA (cerebrovascular accident)   Assessment & Plan    No tim sequelae  Cannot tolerate statins  Continue Plavix  Continue fish oil  At home on red yeast rice as well  Chronic diastolic heart failure (HCC)   Assessment & Plan    With some volume overload early in hospital stay  Now examines euvolemic  Monitor kidney function on lasix   Continue ARB/BB     Renovascular hypertension   Assessment & Plan    Amlodipine decreased to 5mg  Goal <130/80 but not by much per Nephrology  Increased creatinine, Losartan decreased to50mg  Continue metoprolol tartrate 25mg Q12  Change hydralazine PRN to PO  S/p stenting for DONAVAN  IM/Nephro managing, recs appreciated       # Skin  · Encourage regular turning as patient at risk for skin breakdown  · Staff to continue patient education on Q2h turning     # Bowel  · Patient reports no constipation  · last BM 1/25/19   Continu regimen of Colace, senna, and miralax       # Bladder  · Patient voiding spontaneously    # Pain  · Continue tylenol, for max of 3gm daily  · Continue oxycodone   · Continue methocarbamol  · Continue gabapentin  · Decreased oxycodone, and made Robaxin scheduled  # Rehab Psych   · referral to Rehabilitation Psychology    # Other  - Diet/Nutrition:        Diet Orders            Start     Ordered    01/24/19 1248  Diet Regular; Regular House; Fluid Restriction 1200 ML  Diet effective now     Question Answer Comment   Diet Type Regular    Regular Regular House    Other Restriction(s): Fluid Restriction 1200 ML    RD to adjust diet per protocol? Yes        01/24/19 1247    01/18/19 0721  Room Service  Once     Question:  Type of Service  Answer:  Room Service - Appropriate with Assistance    01/18/19 0720    01/17/19 1654  Dietary nutrition supplements  Once     Question Answer Comment   Select Supplement: Ensure Enlive-Vanilla    Frequency Lunch        01/17/19 1653        - DVT prophy: Sequential compression device (Venodyne)  and Enoxaparin (Lovenox)  - GI ppx: None  - Nausea: None  - Supplements: None  - Sleep: None    Disposition: Team 1/23/19: UE ROM/functional strength are barriers  She may have some cognitive deficits at baseline  OT to check MOCA today  Also having issues with the fit of her Estefani collar for showers  Anticipate that her length of stay may be about 2-3 weeks  Will reteam next week      CODE: Level 1: Full Code   Scheduled Meds:    Current Facility-Administered Medications:  acetaminophen 975 mg Oral [facility] Ishan Herman MD   albuterol 2 puff Inhalation Q4H PRN Ishan Herman MD   amLODIPine 5 mg Oral Daily Beatris Hatchet, MD   barium sulfate 450 mL Oral Once in imaging Beatris Hatchet, MD   calcium carbonate-vitamin D 1 tablet Oral BID With Meals Ishan Herman MD   clopidogrel 75 mg Oral Daily Ishan Herman MD   docusate sodium 100 mg Oral BID Ishan Herman MD   enoxaparin 40 mg Subcutaneous Q24H [facility] Depadua, MD   fish oil 1,000 mg Oral Daily Patrica Camacho MD   gabapentin 300 mg Oral BID Ewelina Perales PA-C   gabapentin 600 mg Oral HS Patrica Camacho MD   hydroxychloroquine 200 mg Oral Daily With Breakfast Patrica Camacho MD   levothyroxine 112 mcg Oral Early Morning Patrica Camacho MD   lidocaine 1 patch Topical Daily Patrica Camacho MD   lidocaine 1 patch Topical Daily Patrica Camacho MD   losartan 50 mg Oral HS Patrica Kline MD   methocarbamol 500 mg Oral Q6H [facility] Patrica Camacho MD   metoprolol tartrate 25 mg Oral Q12H [facility] Patrica Camacho MD   nystatin  Topical BID Patrica Camacho MD   oxyCODONE 2 5 mg Oral Q6H PRN Patrica Camacho MD   polyethylene glycol 17 g Oral Daily PRN Patrica Camacho MD   senna 1 tablet Oral HS Patrica Camacho MD   sertraline 25 mg Oral Daily Patrica Camacho MD   trolamine salicylate 1 application Topical 4x Daily PRN Ewelina Perales PA-C        Objective:    Functional Update:   1/25/19 OT: Set-up/Clean-up Eating, oral hygiene, modA sponge bathe, UB dressing, totalA LB dressing, toileting hygiene, modA toilet transfer  1/25/19 PT: CGA-Deondre with transfers, ClS 54' with RW ambulation  Allergies per EMR    Physical Exam:  Temp:  [97 5 °F (36 4 °C)-98 4 °F (36 9 °C)] 97 5 °F (36 4 °C)  HR:  [55-59] 57  Resp:  [18] 18  BP: (124-170)/(50-64) 170/64  SpO2:  [94 %-96 %] 96 %    General: alert, no apparent distress, cooperative and comfortable  HEENT:  Head: Normocephalic, no lesions, without obvious abnormality  C-collar in place  CARDIAC:  regular rate and rhythm, S1, S2 normal, no murmur, click, rub or gallop  LUNGS:  normal air entry, lungs clear to auscultation  ABDOMEN:  soft, non-tender  Bowel sounds normal  No masses, no organomegaly  EXTREMITIES:  NOw wearing TEDs    NEURO:   normal without focal findings, mental status, speech normal, alert and oriented x3 and Strenght in UE intact 5/5, able to demonstrate >3/5 throughout LE    PSYCH:  Normal  and Alert and oriented, appropriate affect  INCISION:  None     Diagnostic Studies: Reviewed  XR chest pa & lateral   Final Result by Giovanni Sanders DO (01/25 9992)   No acute cardiopulmonary disease  Workstation performed: [id number]         CT head wo contrast    (Results Pending)   CT chest abdomen pelvis wo contrast    (Results Pending)       Laboratory: Reviewed    Results from last 7 days  Lab Units 01/24/19  0632 01/21/19  1004   HEMOGLOBIN g/dL 10 6* 12 1   HEMATOCRIT % 34 0* 36 7   WBC Thousand/uL 10 28* 6 69       Results from last 7 days  Lab Units 01/25/19  0504 01/24/19  0632 01/22/19  0527   BUN mg/dL 42* 39* 34*   SODIUM mmol/L 132* 131* 133*   POTASSIUM mmol/L 4 5 4 7 4 5   CHLORIDE mmol/L 96* 98* 98*   CREATININE mg/dL 1 40* 1 20 1 07            Patient Active Problem List   Diagnosis    Closed nondisplaced fracture of second cervical vertebra (HCC)    Neck pain, acute    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)    C6 cervical fracture (HCC)    Hypertension    Hypothyroidism    Fall    Forgetfulness    Ambulatory dysfunction    Physical deconditioning    Acute pain    Delirium    Renovascular hypertension    Chronic diastolic heart failure (HCC)    History of CVA (cerebrovascular accident)    Type 2 diabetes mellitus with diabetic polyneuropathy (HCC)    Depression    Chronic kidney disease    Rheumatoid arthritis involving multiple sites ([facility] [address] )    Fibromyalgia    History of aortic valve replacement with bioprosthetic valve    Hyponatremia    Renal artery stenosis (HCC)    Parenchymal renal hypertension       ** Please Note: Fluency Direct voice to text software may have been used in the creation of this document  **    Total visit time:  At least 25 minutes, with more than 50% spent counseling/coordinating care

## 2019-01-25 NOTE — PROGRESS NOTES
01/25/19 1230   Pain Assessment   Pain Assessment 0-10   Pain Score 4   Pain Type Acute pain   Pain Location Head   Pain Orientation Posterior   Restrictions/Precautions   Precautions Bed/chair alarms; Fall Risk;Spinal precautions;Supervision on toilet/commode;Pain   Braces or Orthoses C/S Collar   Subjective   Subjective pt states feeling okay a little pain as above in subject    QI: Sit to Stand   Assistance Needed Incidental touching   Sit to Stand CARE Score 4   Bed Mobility   Findings pt recliner    QI: Chair/Bed-to-Chair Transfer   Assistance Needed Physical assistance   Assistance Provided by [place] Less than 25%   Chair/Bed-to-Chair Transfer CARE Score 3   Transfer Bed/Chair/Wheelchair   Limitations Noted In Endurance;Balance;Pain Management; Sequencing;LE Strength   Adaptive Equipment Roller Walker   Stand Pivot Contact Guard   Sit to Stand Supervision   Stand to [employer] Transfer Minimal Assist   Findings with Car transfers Mark with BL LE    Bed, Chair, Wheelchair Transfer (FIM) 4 - Patient requires steadying assist or light touching   QI: Car Transfer   Assistance Needed Physical assistance   Assistance Provided by [place] 25%-49%   Car Transfer CARE Score 3   QI: Walk 50 Feet with Two Turns   Assistance Needed Incidental touching;Supervision   Walk 50 Feet with Two Turns CARE Score 4   Ambulation   Does the patient walk? 2  Yes   Primary Discharge Mode of Locomotion Walk   Walk Assist Level Close Supervision;Contact Guard   Gait Pattern Antalgic; Inconsistant Jenny;Decreased foot clearance; Wide JEFE; Improper weight shift   Assist Device Lisa Fang Walked (feet) 80 ft  (x2)   Limitations Noted In Endurance;Balance; Heel Strike;Strength; Safety;Posture   Findings 50 x2 and 25 x2 to inc endurance and short ambulation for home    Wheelchair mobility   QI: Does the patient use a wheelchair? 0   No   Wheelchair (FIM) 0 - Activity does not occur   QI: 1 Step (Curb)   Assistance Needed Incidental touching   Comment 4 inch step up   1 Step (Curb) CARE Score 4   QI: 4 Steps   Assistance Needed Incidental touching   4 Steps CARE Score 4   Stairs   Type Curb;Stairs   # of Steps 4   Weight Bearing Precautions Fall Risk   Assist Devices Bilateral Rail;Walker   Findings instructed pt with ascending and descdnding stairs and curb steps with foot and  handplacement and RW    Stairs (FIM) 2 - Patient goes up and down 4 - 11 stairs regardless of assist/device/setup   QI: Picking Up Object   Reason if not Attempted Safety concerns   Picking Up Object CARE Score 88   Toilet Transfer   Surface Assessed Standard Toilet   Limitations Noted In Endurance;Balance; Safety;LE Strength   Adaptive Equipment Grab Bar   Toilet Transfer (FIM) 4 - Patient requires steadying assist or light touching   Therapeutic Interventions   Strengthening LAQ QS hip flexion x20  AP x20 WC push ups x10    Flexibility B/L LE stretch in sitting    Balance walking backward with RW    Equipment Use   NuStep 10 min level 2    Assessment   Treatment Assessment pt perform thex ex's functional , gait training  with RW functional balance training w/o support AD   pt demo / better cande during ambulation speed inc with RW , pt instructed on C ' collar to repositon  Spoke with nsging about pt pain level and recv'ing pain meds before PT session  Spoke with pt daughter  about pt progresing pain level and setting up car transfer next week  Pt perform 4 inch curb step with RW and ascending and descending [facility] at [facility] with vc's for foot and RW placement  Pt will cont toward goals for DC/ and pt return to her room in recliner with all needs in reach and chair alarm on   PT Barriers   Physical Impairment Decreased strength;Decreased endurance;Decreased mobility; Impaired balance;Decreased safety awareness;Pain;Orthopedic restrictions   Functional Limitation Car transfers   Plan   Treatment/Interventions Functional transfer training;LE strengthening/ROM; Elevations; Therapeutic exercise; Endurance training;Patient/family training;Gait training   Progress Progressing toward goals   PT Therapy Minutes   PT Time In 1230   PT Time Out 1400   PT Total Time (minutes) 90   PT Mode of treatment - Individual (minutes) 90   PT Mode of treatment - Concurrent (minutes) 0   PT Mode of treatment - Group (minutes) 0   PT Mode of treatment - Co-treat (minutes) 0   PT Mode of Teatment - Total time(minutes) 90 minutes   Therapy Time missed   Time missed?  No

## 2019-01-25 NOTE — PROGRESS NOTES
Internal Medicine Progress Note  Patient: Eneida Anglin  Age/sex: 80 y o  female  Medical Record #: [id number]      ASSESSMENT/PLAN:  Eneida Anglin is seen and examined and management for following issues:    Type III odontoid fracture with fracture line extending to the right/left superior articular facets and left transverse foramen of C2; nondisplaced fracture through bulky, bridging osteophytes at the C6 level:  required no surgery  Continue collar  Pt reported severe Rt facial pain on 1/19 and an afternoon dose of gabapentin was added  Facial pain resolved      HTN: on Norvasc 10mg qd, Cozaar 100mg qd, Lopressor 25mg q12hrs = renal decreased Losartan to 50mg qd and the Norvasc to 5mg qd today    Hyponatremia:  Renal is following = added Torsemide but held today; on  FR 1200  CXR was negative and renal getting CT C/A/P to explore etio of the hyponatremia     CKD; baseline 1 1-1 3:  above baseline today so renal decreased Losartan and held Torsemide; for PVRs      Hx bioAVR 2012: had no significant disease when had card cath before surgery     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 2016:  continue Plavix that she has been on since CVA 2005; with her TIA 2016, neuro kept Plavix; she is intol statins     DM diet controlled:   watching fasting blood sugars  Today was 80     Chronic diastolic CHF, LVEF 91%, mild-mod MR/mild TR and norm function AVR 2016:  on Lasix 20mg qd as at home which is now changed to Torsemide 10 mg qd by renal   Follows with Dr Raynell Cockayne  Family says LE edema is less than at home  CXR yesterday was negative for CHF     RA/SLE:  continue Plaquenil     HLD: intol to statin; continue Red yeast rice and Fish oil when she goes home     Hypothyroidism:  continue current Levothyroxine     Hx right RA stent: BPs since stent placement is controlled     Depression: Zoloft     Chronic pain/fibromyalgia:  on Hydrocodone at home     Hx breast cancer: follows with Thompson Vargas      Subjective:  denies any complaints      ROS:   GI: denies abdominal pain, change bowel habits or reflux symptoms  Neuro: +Right facial pain  Respiratory: No Cough, SOB  Cardiovascular: No CP, palpitations     Scheduled Meds:    Current Facility-Administered Medications:  acetaminophen 975 mg Oral Replaced by [facility] Travis Parker MD   albuterol 2 puff Inhalation Q4H PRN Travis Parker MD   amLODIPine 5 mg Oral Daily Dima Gould MD   calcium carbonate-vitamin D 1 tablet Oral BID With Meals Travis Parker MD   clopidogrel 75 mg Oral Daily Travis Parker MD   docusate sodium 100 mg Oral BID Travis Parker MD   enoxaparin 40 mg Subcutaneous Q24H [address] Travis Parker MD   fish oil 1,000 mg Oral Daily Travis Parker MD   gabapentin 300 mg Oral BID Ewelina Perales PA-C   gabapentin 600 mg Oral HS Travis Parker MD   hydroxychloroquine 200 mg Oral Daily With Breakfast Travis Parker MD   levothyroxine 112 mcg Oral Early Morning Travis Parker MD   lidocaine 1 patch Topical Daily Travis Parker MD   lidocaine 1 patch Topical Daily Travis Parker MD   losartan 50 mg Oral HS Dima Gould MD   methocarbamol 500 mg Oral Q6H [address] Travsi Parker MD   metoprolol tartrate 25 mg Oral Q12H [address] Travis Parker MD   nystatin  Topical BID Travis Parker MD   oxyCODONE 2 5 mg Oral Q6H PRN Travis Parker MD   polyethylene glycol 17 g Oral Daily PRN Travis Parker MD   senna 1 tablet Oral HS Travis Parker MD   sertraline 25 mg Oral Daily Travis Parker MD   trolamine salicylate 1 application Topical 4x Daily PRN Ewelina Perales PA-C       Labs:       Results from last 7 days  Lab Units 01/24/19  0632 01/21/19  1004   WBC Thousand/uL 10 28* 6 69   HEMOGLOBIN g/dL 10 6* 12 1   HEMATOCRIT % 34 0* 36 7   PLATELETS Thousands/uL 206 205       Results from last 7 days  Lab Units 01/25/19  0504 01/24/19  0632   SODIUM mmol/L 132* 131*   POTASSIUM mmol/L 4 5 4 7   CHLORIDE mmol/L 96* 98*   CO2 mmol/L 29 27   BUN mg/dL 42* 39*   CREATININE mg/dL 1 40* 1 20   CALCIUM mg/dL 9 0 9 1 Results from last 7 days  Lab Units 01/18/19  1526   POC GLUCOSE mg/dl 124     [unfilled]    Labs reviewed    Physical Examination:  Vitals:   Vitals:    01/24/19 1300 01/24/19 2211 01/25/19 0458 01/25/19 0852   BP: 143/56 142/50 124/50 126/60   BP Location: Right arm Left arm Right arm    Pulse: 60 59 55    Resp: 18 18 18    Temp: 98 4 °F (36 9 °C) 98 4 °F (36 9 °C) 98 °F (36 7 °C)    TempSrc: Oral Oral Oral    SpO2: 98% 94% 96%    Weight:       Height:           HEENT:  No thrush  RESP: CTAB, no R/R/W; resp unlabored  CV: +S1 S2, regular rate, no rubs/murmurs  ABD: soft, NT, ND, normal BS   EXT: edema of LEs L>R  Neuro: AAOx3  [ X ] Total time spent: 30 Mins and greater than 50% of this time was spent counseling/coordinating care  ** Please Note: Dragon 360 Dictation voice to text software may have been used in the creation of this document   **

## 2019-01-25 NOTE — PROGRESS NOTES
01/25/19 0700   Pain Assessment   Pain Score 3   Pain Location Head   Hospital Pain Intervention(s) Distraction; Emotional support; Environmental changes; Rest   Diversional Activities Other (Comment)  (conversation)   Restrictions/Precautions   Precautions Bed/chair alarms; Fall Risk;Fluid restriction;Spinal precautions;Supervision on toilet/commode   QI: Eating   Assistance Needed Set-up / [address] Provided by [place] No physical assistance   Eating CARE Score 5   Eating Assessment   Eating (FIM) 5 - Patient needs help to open contianers or set up tray   QI: Oral Hygiene   Assistance Needed Set-up / [address] [place] Provided by [place] No physical assistance   Oral Hygiene CARE Score 5   Grooming   Able To Wash/Dry Face;Brush/Clean Teeth;Wash/Dry Hands   Limitation Noted In Timeliness; Other  (Positioning)   Findings (seated at sinkside in w/c with verbal cues for positioning )   Grooming (FIM) 5 - [place] sets up supplies or applies device   QI: Shower/Bathe Self   Assistance Needed Physical assistance   Assistance Provided by [place] 50%-74%   Comment sponge bath completed- awaiting showering orders   Shower/Bathe Self CARE Score 2   Bathing   Assessed Bath Style Sponge Bath   Anticipated D/C Bath Style Shower   Able to [place] No   Able to [employer] Temperature Yes   Able to Wash/Rinse/Dry (body part) Left Arm;Right Arm;L Upper Leg;R Upper Leg;Chest;Abdomen   Limitations Noted in Coordination; Endurance;Problem Solving;Strength;Timeliness   Positioning Seated  (seated in w/c at sinkside)   Adaptive Equipment Longhand Reacher  (education given on LH sponge for BLEs for spinal precautions)   Findings  (UB- set-up A for spongebath seated in w/c, LB- Max A w/ LHAE)   Bathing (FIM) 2 - Patient completes 3/10 or 4/10 parts   Tub/Shower Transfer   Not Assessed Sponge Bath;Medical   QI: Upper Body Dressing   Assistance Needed Physical assistance   Assistance Provided by [place] 25%-49%   Upper Body Dressing CARE Score 3   QI: Lower Body Dressing   Assistance Needed Physical assistance   Assistance Provided by [place] 75% or more   Lower Body Dressing CARE Score 2   QI: Putting On/Taking Off Footwear   Assistance Needed Physical assistance   Assistance Provided by [place] Total assistance   Putting On/Taking Off Footwear CARE Score 1   Dressing/Undressing Clothing   Remove UB Clothes Other  (hospital gown)   Remove LB Clothes Undergarment;Socks   [address]; Undergarment;Socks; Shoes   Limitations Noted In Balance; Endurance;Problem Solving; Safety;Strength;Timeliness   Adaptive Equipment Reacher;Sock Aide;Dressing Stick   Positioning Supported Sit  (sinkside in w/c)   Findings Pt seated sinkside in w/c with education provided on AE education/training with Min A for UB to don pullover shirts overhead with C/S collar, with Max A for LB dressing with AE training provided for threading pants, and donning/doffing socks bilaterally to follow spinal precautions  Pt required Max verbal cues and assistance to complete LB dressing at this time  Pt Dep for donning shoes  UB Dressing (FIM) 4 - Patient completes 75% of all tasks   LB Dressing (FIM) 2 - Patient completes 25-49% of all tasks   QI: Sit to Stand   Assistance Needed Physical assistance   Assistance Provided by [place] Less than 25%   Sit to Stand CARE Score 3   QI: Chair/Bed-to-Chair Transfer   Assistance Needed Physical assistance   Assistance Provided by [place] Less than 25%   Chair/Bed-to-Chair Transfer CARE Score 3   Transfer Bed/Chair/Wheelchair   Sit to Stand Minimal;Assist x 1   Stand to Sit Minimal;Assist x 1   Bed, Chair, Wheelchair Transfer (FIM) 4 - Patient completes 75% of all tasks   QI: [address] Needed Physical assistance   Assistance Provided by [place] Total assistance   Toileting Hygiene CARE Score 1   Toileting   Able to [address] down no, up no     Able to [employer] No   Manage Hygiene Bladder; Bowel   Limitations Noted In Balance; Safety; Other  (spinal precautions)   Toileting (FIM) 1 - Patient completes less than 25% of all tasks   QI: Toilet Transfer   Assistance Needed Physical assistance   Assistance Provided by [place] 25%-49%   Toilet Transfer CARE Score 3   Toilet Transfer   Surface Assessed Raised Toilet   Transfer Technique Standard   Limitations Noted In Balance; Endurance; Safety   Adaptive Equipment Grab Bar  (Rolling Walker)   Toilet Transfer (FIM) 4 - Patient completes 75% of all tasks   Assessment   Treatment Assessment Pt participated in skilled OT session for up to 90 minutes with fair activity tolerance with focus on self-care ADL tasks  Pt seated in recliner chair at start of therapy session  Pt transferred sit to stand at [address] level with RW  Pt participated in functional mobility with RW to bathroom at [address] [place] [address] level with RW  Pt participated in toilet transfers at [address] [place] [address] with RW and grab bars  Pt participated Max A for clothing management/hygiene tasks  Pt participated in sponge bath at sinkside seated in w/c  Pt participated in UB bathing at set-up A level, with Min A needed to don shirt over head secondary to managing over C/S collar  Pt participated in LB bathing routine upper LEs at Sup level, with [facility] training completed with reacher/dressing stick for LEs seated in w/c  Pt participated in LB dressing routine at Max A level with [facility] training completed with max verbal cues following demonstration to utilize with poor carryover noted at this time with continued training needed for pt to follow spinal precautions at this time  Pt Dep for shoes at this time  Pt participated in functional mobility from bathroom to recliner chair in room at [address] [place] [address] with RW  Pt seated in recliner chair with pillow support at end of therapy session with breakfast tray  Pt required set-up A with tray   Pt would benefit from continued OT services to progress pt with self-care ADL tasks to least restrictive level for safe d/c to home at [facility]  Problem List Decreased endurance; Impaired balance;Decreased mobility; Decreased coordination;Decreased safety awareness   Plan   Treatment/Interventions ADL retraining;Functional transfer training; Therapeutic exercise; Endurance training;Patient/family training;Bed mobility; Compensatory technique education   OT Therapy Minutes   OT Time In 0700   OT Time Out 0830   OT Total Time (minutes) 90   OT Mode of treatment - Individual (minutes) 90   OT Mode of treatment - Concurrent (minutes) 0   OT Mode of treatment - Group (minutes) 0   OT Mode of treatment - Co-treat (minutes) 0   OT Mode of Teatment - Total time(minutes) 90 minutes   Therapy Time missed   Time missed?  No

## 2019-01-26 LAB
ANION GAP SERPL CALCULATED.3IONS-SCNC: 8 MMOL/L (ref 4–13)
BUN SERPL-MCNC: 51 MG/DL (ref 5–25)
CALCIUM SERPL-MCNC: 8.9 MG/DL (ref 8.3–10.1)
CHLORIDE SERPL-SCNC: 96 MMOL/L (ref 100–108)
CO2 SERPL-SCNC: 25 MMOL/L (ref 21–32)
CREAT SERPL-MCNC: 1.36 MG/DL (ref 0.6–1.3)
CREAT UR-MCNC: 51 MG/DL
GFR SERPL CREATININE-BSD FRML MDRD: 36 ML/MIN/1.73SQ M
GLUCOSE P FAST SERPL-MCNC: 91 MG/DL (ref 65–99)
GLUCOSE SERPL-MCNC: 91 MG/DL (ref 65–140)
POTASSIUM SERPL-SCNC: 4.6 MMOL/L (ref 3.5–5.3)
PROT UR-MCNC: 21 MG/DL
PROT/CREAT UR: 0.41 MG/G{CREAT} (ref 0–0.1)
SODIUM SERPL-SCNC: 129 MMOL/L (ref 136–145)

## 2019-01-26 PROCEDURE — 80048 BASIC METABOLIC PNL TOTAL CA: CPT | Performed by: INTERNAL MEDICINE

## 2019-01-26 PROCEDURE — 97110 THERAPEUTIC EXERCISES: CPT

## 2019-01-26 PROCEDURE — 97530 THERAPEUTIC ACTIVITIES: CPT

## 2019-01-26 PROCEDURE — 82570 ASSAY OF URINE CREATININE: CPT | Performed by: INTERNAL MEDICINE

## 2019-01-26 PROCEDURE — 99232 SBSQ HOSP IP/OBS MODERATE 35: CPT | Performed by: PHYSICAL MEDICINE & REHABILITATION

## 2019-01-26 PROCEDURE — 97535 SELF CARE MNGMENT TRAINING: CPT

## 2019-01-26 PROCEDURE — 97116 GAIT TRAINING THERAPY: CPT

## 2019-01-26 PROCEDURE — 99232 SBSQ HOSP IP/OBS MODERATE 35: CPT | Performed by: INTERNAL MEDICINE

## 2019-01-26 PROCEDURE — 84156 ASSAY OF PROTEIN URINE: CPT | Performed by: INTERNAL MEDICINE

## 2019-01-26 RX ORDER — SODIUM CHLORIDE 1000 MG
2 TABLET, SOLUBLE MISCELLANEOUS 2 TIMES DAILY WITH MEALS
Status: DISCONTINUED | OUTPATIENT
Start: 2019-01-26 | End: 2019-01-27

## 2019-01-26 RX ORDER — SODIUM CHLORIDE 1000 MG
1 TABLET, SOLUBLE MISCELLANEOUS
Status: DISCONTINUED | OUTPATIENT
Start: 2019-01-26 | End: 2019-01-26

## 2019-01-26 RX ADMIN — CALCIUM CARBONATE 500 MG (1,250 MG)-VITAMIN D3 200 UNIT TABLET 1 TABLET: at 08:11

## 2019-01-26 RX ADMIN — METOPROLOL TARTRATE 25 MG: 25 TABLET, FILM COATED ORAL at 21:15

## 2019-01-26 RX ADMIN — LEVOTHYROXINE SODIUM 112 MCG: 112 TABLET ORAL at 05:26

## 2019-01-26 RX ADMIN — LIDOCAINE 1 PATCH: 50 PATCH CUTANEOUS at 08:11

## 2019-01-26 RX ADMIN — DOCUSATE SODIUM 100 MG: 100 CAPSULE, LIQUID FILLED ORAL at 18:32

## 2019-01-26 RX ADMIN — LIDOCAINE 1 PATCH: 50 PATCH CUTANEOUS at 08:12

## 2019-01-26 RX ADMIN — ENOXAPARIN SODIUM 40 MG: 40 INJECTION SUBCUTANEOUS at 08:11

## 2019-01-26 RX ADMIN — OXYCODONE HYDROCHLORIDE 2.5 MG: 5 TABLET ORAL at 12:26

## 2019-01-26 RX ADMIN — SODIUM CHLORIDE TAB 1 GM 1 G: 1 TAB at 14:16

## 2019-01-26 RX ADMIN — METHOCARBAMOL 500 MG: 500 TABLET, FILM COATED ORAL at 00:55

## 2019-01-26 RX ADMIN — METHOCARBAMOL 500 MG: 500 TABLET, FILM COATED ORAL at 05:26

## 2019-01-26 RX ADMIN — OXYCODONE HYDROCHLORIDE 2.5 MG: 5 TABLET ORAL at 05:26

## 2019-01-26 RX ADMIN — GABAPENTIN 300 MG: 300 CAPSULE ORAL at 14:15

## 2019-01-26 RX ADMIN — SERTRALINE HYDROCHLORIDE 25 MG: 25 TABLET ORAL at 21:15

## 2019-01-26 RX ADMIN — SODIUM CHLORIDE TAB 1 GM 1 G: 1 TAB at 16:44

## 2019-01-26 RX ADMIN — STANDARDIZED SENNA CONCENTRATE 8.6 MG: 8.6 TABLET ORAL at 21:15

## 2019-01-26 RX ADMIN — LOSARTAN POTASSIUM 50 MG: 50 TABLET, FILM COATED ORAL at 21:15

## 2019-01-26 RX ADMIN — GABAPENTIN 600 MG: 300 CAPSULE ORAL at 21:15

## 2019-01-26 RX ADMIN — ACETAMINOPHEN 975 MG: 325 TABLET, FILM COATED ORAL at 14:15

## 2019-01-26 RX ADMIN — METOPROLOL TARTRATE 25 MG: 25 TABLET, FILM COATED ORAL at 08:11

## 2019-01-26 RX ADMIN — NYSTATIN 1 APPLICATION: 100000 POWDER TOPICAL at 21:23

## 2019-01-26 RX ADMIN — METHOCARBAMOL 500 MG: 500 TABLET, FILM COATED ORAL at 18:32

## 2019-01-26 RX ADMIN — Medication 1000 MG: at 08:12

## 2019-01-26 RX ADMIN — METHOCARBAMOL 500 MG: 500 TABLET, FILM COATED ORAL at 12:25

## 2019-01-26 RX ADMIN — GABAPENTIN 300 MG: 300 CAPSULE ORAL at 08:11

## 2019-01-26 RX ADMIN — CALCIUM CARBONATE 500 MG (1,250 MG)-VITAMIN D3 200 UNIT TABLET 1 TABLET: at 16:31

## 2019-01-26 RX ADMIN — NYSTATIN: 100000 POWDER TOPICAL at 09:00

## 2019-01-26 RX ADMIN — ACETAMINOPHEN 975 MG: 325 TABLET, FILM COATED ORAL at 05:26

## 2019-01-26 RX ADMIN — OXYCODONE HYDROCHLORIDE 2.5 MG: 5 TABLET ORAL at 18:32

## 2019-01-26 RX ADMIN — ACETAMINOPHEN 975 MG: 325 TABLET, FILM COATED ORAL at 21:15

## 2019-01-26 RX ADMIN — DOCUSATE SODIUM 100 MG: 100 CAPSULE, LIQUID FILLED ORAL at 08:11

## 2019-01-26 RX ADMIN — HYDROXYCHLOROQUINE SULFATE 200 MG: 200 TABLET, FILM COATED ORAL at 08:12

## 2019-01-26 RX ADMIN — CLOPIDOGREL BISULFATE 75 MG: 75 TABLET ORAL at 08:11

## 2019-01-26 RX ADMIN — AMLODIPINE BESYLATE 5 MG: 5 TABLET ORAL at 08:11

## 2019-01-26 NOTE — PROGRESS NOTES
Internal Medicine Progress Note  Patient: Ousmane Hernandez  Age/sex: 80 y o  female  Medical Record #: [id number]      ASSESSMENT/PLAN:  Ousmane Hernandez is seen and examined and management for following issues:    Type III odontoid fracture with fracture line extending to the right/left superior articular facets and left transverse foramen of C2; nondisplaced fracture through bulky, bridging osteophytes at the C6 level:  required no surgery  Continue collar  Pt reported severe Rt facial pain on 1/19 and an afternoon dose of gabapentin was added  Facial pain resolved      HTN: on Norvasc 10mg qd, Cozaar 100mg qd, Lopressor 25mg q12hrs = renal decreased Losartan to 50mg qd and the Norvasc to 5mg qd today    Hyponatremia:  Renal is following = added Torsemide but held today; on  FR 1200  CXR was negative and renal getting CT C/A/P negative for apparent etiology for hyponatremia  Need Followup 3-6 months for ground-glass finding lower lung base  Sodium tabs t i d  Added     CKD; baseline 1 1-1 3:  above baseline today so renal decreased Losartan and held Torsemide; for PVRs      Hx bioAVR 2012: had no significant disease when had card cath before surgery     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 2016:  continue Plavix that she has been on since CVA 2005; with her TIA 2016, neuro kept Plavix; she is intol statins     DM diet controlled:   watching fasting blood sugars  Today was 80     Chronic diastolic CHF, LVEF 59%, mild-mod MR/mild TR and norm function AVR 2016:  on Lasix 20mg qd as at home which is now changed to Torsemide 10 mg qd by renal   Follows with Dr Noe Lopes  Family says LE edema is less than at home    CXR yesterday was negative for CHF     RA/SLE:  continue Plaquenil     HLD: intol to statin; continue Red yeast rice and Fish oil when she goes home     Hypothyroidism:  continue current Levothyroxine     Hx right RA stent: BPs since stent placement is controlled     Depression: Zoloft     Chronic pain/fibromyalgia:  on Hydrocodone at home     Hx breast cancer: follows with Dominique Robert      Subjective:  denies any complaints      ROS:   GI: denies abdominal pain, change bowel habits or reflux symptoms  Neuro: +Right facial pain  Respiratory: No Cough, SOB  Cardiovascular: No CP, palpitations     Scheduled Meds:    Current Facility-Administered Medications:  acetaminophen 975 mg Oral [facility] Matthew Manzanares MD   albuterol 2 puff Inhalation Q4H PRN Matthew Manzanares, MD   amLODIPine 5 mg Oral Daily Rafal Peng MD   barium sulfate 450 mL Oral Once in imaging Rafal Peng MD   calcium carbonate-vitamin D 1 tablet Oral BID With Meals Matthew Manzanares, MD   clopidogrel 75 mg Oral Daily Matthew Erps, MD   docusate sodium 100 mg Oral BID Matthew Erps, MD   enoxaparin 40 mg Subcutaneous Q24H [address] Matthew Erps, MD   fish oil 1,000 mg Oral Daily MARVINula Erps, MD   gabapentin 300 mg Oral BID Ewelina Perales PA-C   gabapentin 600 mg Oral HS Matthew Erps, MD   hydroxychloroquine 200 mg Oral Daily With Breakfast Matthew Erps, MD   levothyroxine 112 mcg Oral Early Morning Zula Erps, MD   lidocaine 1 patch Topical Daily MARVINula Erps, MD   lidocaine 1 patch Topical Daily Zula Erps, MD   losartan 50 mg Oral HS Rafal Peng, MD   methocarbamol 500 mg Oral Q6H [address] Zula Erps, MD   metoprolol tartrate 25 mg Oral Q12H [address] Zula Erps, MD   nystatin  Topical BID Matthew Erps, MD   oxyCODONE 2 5 mg Oral Q6H PRN Matthew Erps, MD   polyethylene glycol 17 g Oral Daily PRN Matthew Erps, MD   senna 1 tablet Oral HS Zula Erps, MD   sertraline 25 mg Oral Daily Zula Erps, MD   sodium chloride 1 g Oral TID With Meals NATHEN Henry   trolamine salicylate 1 application Topical 4x Daily PRN Ewelina Perales PA-C       Labs:       Results from last 7 days  Lab Units 01/24/19  0632 01/21/19  1004   WBC Thousand/uL 10 28* 6 69   HEMOGLOBIN g/dL 10 6* 12 1   HEMATOCRIT % 34 0* 36 7   PLATELETS Thousands/uL 206 205 Results from last 7 days  Lab Units 01/26/19  0605 01/25/19  0504   SODIUM mmol/L 129* 132*   POTASSIUM mmol/L 4 6 4 5   CHLORIDE mmol/L 96* 96*   CO2 mmol/L 25 29   BUN mg/dL 51* 42*   CREATININE mg/dL 1 36* 1 40*   CALCIUM mg/dL 8 9 9 0                    [unfilled]    Labs reviewed    Physical Examination:  Vitals:   Vitals:    01/25/19 1425 01/25/19 2030 01/25/19 2100 01/26/19 0632   BP: 130/70 142/65 142/65 138/63   BP Location: Left arm  Left arm Left arm   Pulse:  62 60 55   Resp:   18 18   Temp:   98 4 °F (36 9 °C) 97 7 °F (36 5 °C)   TempSrc:   Oral Oral   SpO2:   97% 96%   Weight:       Height:           HEENT:  No thrush  RESP: CTAB, no R/R/W; resp unlabored  CV: +S1 S2, regular rate, no rubs/murmurs  ABD: soft, NT, ND, normal BS   EXT: edema of LEs L>R  Neuro: AAOx3  [ X ] Total time spent: 30 Mins and greater than 50% of this time was spent counseling/coordinating care  ** Please Note: Dragon 360 Dictation voice to text software may have been used in the creation of this document   **

## 2019-01-26 NOTE — PROGRESS NOTES
NEPHROLOGY PROGRESS NOTE   Hurman Phoenix 80 y o  female MRN: [id number]  Unit/Bed#: -01 Encounter: [id number]  Reason for Consult: Hyponatremia, CKd    ASSESSMENT AND PLAN:  55-year-old female with history of diabetes mellitus/hypertension/renal artery disease/hypothyroidism/ambulatory dysfunction/CVA/rheumatoid arthritis/fibromyalgia/aortic valve replacement who presents with a fall slipping  She has a T3 fracture extending to the right and left superior articular facets and left transfers foramen  Also an acute nondisplaced C6 fracture  She is admitted for rehabilitation  We are asked to follow her for hyponatremia    Hyponatremia could be secondary to component of hypervolemia/SIADH  -serum sodium has worsened at 129 Today  Agree with starting salt tablet will increase dose to 2 g p o  B i d  Holding diuretics for time being   -strict fluid restriction 1 2 L per day  Workup:  · · Urine for sodium:  56 but that was on furosemide so inconclusive  · · Urine for osmolality:  388  · · Serum for osmolality:  287 borderline  · · TSH:  3 0 acceptable  · · A m  Cortisol:  17 8 therefore negative adrenal insufficiency  · · Uric acid:  5 9 more likely related to prerenal  · · Chest x-ray:  No acute disease no evidence of CHF    Mild HERLINDA on CKD stage 3, baseline creatinine around one  Creatinine has peaked at 1 4 which has now slightly improved at 1 3 after reducing losartan   -continue to monitor renal function  Diuretics is currently being held   -avoid nephrotoxins or NSAIDs  -allows SBP 130s to 140s-  -urinalysis shows no significant hematuria or proteinuria  -CT scan shows no hydronephrosis  -bladder scan nonsignificant   -losartan reduced  Hypertension, blood pressure overall acceptable  Renal artery disease status post renal artery stent  Losartan reduced recently  Diuretics is on hold for now  May consider restarting diuretics in next 24 hr depending on renal function and volume status      Will discuss with primary team    SUBJECTIVE:  Patient seen and examined at bedside  No chest pain, shortness of breath, nausea, vomiting, abdominal pain or diarrhea  No urinary complaints       OBJECTIVE:  Current Weight: Weight - Scale: 84 1 kg (185 lb 6 5 oz)  Vitals:    01/26/19 1344   BP: 138/60   Pulse: 66   Resp: 18   Temp: 97 5 °F (36 4 °C)   SpO2: 97%       Intake/Output Summary (Last 24 hours) at 01/26/19 1510  Last data filed at 01/26/19 1200   Gross per 24 hour   Intake              360 ml   Output              742 ml   Net             -382 ml       Physical Examination:  General:  Lying in bed, no acute distress   Eyes:  Mild conjunctival pallor present  ENT:  External examination ears and nose unremarkable  Neck:  Neck collar present  Respiratory:  Bilateral air entry present  CVS:  S1, S2 present  GI:  Soft, nontender, nondistended  CNS:  Active alert oriented x3  Extremities:  Has trace edema in both legs with some nonpitting component  Skin:  No new rash in legs    Medications:    Current Facility-Administered Medications:     acetaminophen (TYLENOL) tablet 975 mg, 975 mg, Oral, Q8H [address], Lima Self MD, 975 mg at 01/26/19 1415    albuterol (PROVENTIL HFA,VENTOLIN HFA) inhaler 2 puff, 2 puff, Inhalation, Q4H PRN, Lmia Self MD    amLODIPine (NORVASC) tablet 5 mg, 5 mg, Oral, Daily, Shawn Swain MD, 5 mg at 01/26/19 6746    barium sulfate 2 1 % suspension 450 mL, 450 mL, Oral, Once in imaging, Shawn Swain MD    calcium carbonate-vitamin D (OSCAL-D) 500 mg-200 units per tablet 1 tablet, 1 tablet, Oral, BID With Meals, Lima Self MD, 1 tablet at 01/26/19 0811    clopidogrel (PLAVIX) tablet 75 mg, 75 mg, Oral, Daily, Lima Self MD, 75 mg at 01/26/19 0811    docusate sodium (COLACE) capsule 100 mg, 100 mg, Oral, BID, Lima Self MD, 100 mg at 01/26/19 0811    enoxaparin (LOVENOX) subcutaneous injection 40 mg, 40 mg, Subcutaneous, Q24H [address], Lima Self MD, 40 mg at 01/26/19 0811    fish oil capsule 1,000 mg, 1,000 mg, Oral, Daily, Cynthia Schroeder MD, 1,000 mg at 01/26/19 8359    gabapentin (NEURONTIN) capsule 300 mg, 300 mg, Oral, BID, Ewelina Perales PA-C, 300 mg at 01/26/19 1415    gabapentin (NEURONTIN) capsule 600 mg, 600 mg, Oral, HS, Cynthia Schroeder MD, 600 mg at 01/25/19 2136    hydroxychloroquine (PLAQUENIL) tablet 200 mg, 200 mg, Oral, Daily With Breakfast, Cynthia Schroeder MD, 200 mg at 01/26/19 4723    levothyroxine tablet 112 mcg, 112 mcg, Oral, Early Morning, Cynthia Schroeder MD, 112 mcg at 01/26/19 0526    lidocaine (LIDODERM) 5 % patch 1 patch, 1 patch, Topical, Daily, Cynthia Schroeder MD, 1 patch at 01/26/19 0812    lidocaine (LIDODERM) 5 % patch 1 patch, 1 patch, Topical, Daily, Cynthia Schroeder MD, 1 patch at 01/26/19 0811    losartan (COZAAR) tablet 50 mg, 50 mg, Oral, HS, Leonel Pimentel MD, 50 mg at 01/25/19 2136    methocarbamol (ROBAXIN) tablet 500 mg, 500 mg, Oral, Q6H [facility], Cynthia Schroeder MD, 500 mg at 01/26/19 1225    metoprolol tartrate (LOPRESSOR) tablet 25 mg, 25 mg, Oral, Q12H [facility], Cynthia Schroeder MD, 25 mg at 01/26/19 4531    nystatin (MYCOSTATIN) powder, , Topical, BID, Cynthia Schroeder MD    oxyCODONE (ROXICODONE) IR tablet 2 5 mg, 2 5 mg, Oral, Q6H PRN, Cynthia Schroeder MD, 2 5 mg at 01/26/19 1226    polyethylene glycol (MIRALAX) packet 17 g, 17 g, Oral, Daily PRN, Cynthia Schroeder MD    [facility]) tablet 8 6 mg, 1 tablet, Oral, HS, Cynthia Schroeder MD, 8 6 mg at 01/25/19 2137    sertraline (ZOLOFT) tablet 25 mg, 25 mg, Oral, Daily, Cynthia Schroeder MD, 25 mg at 01/25/19 2137    sodium chloride tablet 1 g, 1 g, Oral, TID With Meals, NATHEN Vega, 1 g at 01/26/19 1416    trolamine salicylate (ASPERCREME) 10 % cream 1 application, 1 application, Topical, 4x Daily PRN, Ewelina Perales PA-C, 1 application at 56/13/87 1533    Laboratory Results:    Results from last 7 days  Lab Units 01/26/19  0605 01/25/19  8826 01/24/19  4772 01/22/19  0527 01/21/19  1004   WBC Thousand/uL  --   --  10 28*  --  6 69   HEMOGLOBIN g/dL  --   --  10 6*  --  12 1   HEMATOCRIT %  --   --  34 0*  --  36 7   PLATELETS Thousands/uL  --   --  206  --  205   POTASSIUM mmol/L 4 6 4 5 4 7 4 5 4 1   CHLORIDE mmol/L 96* 96* 98* 98* 98*   CO2 mmol/L 25 29 27 26 27   BUN mg/dL 51* 42* 39* 34* 30*   CREATININE mg/dL 1 36* 1 40* 1 20 1 07 0 99   CALCIUM mg/dL 8 9 9 0 9 1 8 9 9 1   MAGNESIUM mg/dL  --  2 6  --   --   --        Results for orders placed during the hospital encounter of 01/09/19   XR chest portable    Narrative CHEST     INDICATION:   pulm congestion  COMPARISON:  January 10, 2019    EXAM PERFORMED/VIEWS:  XR CHEST PORTABLE      FINDINGS:  There are median sternotomy wires indicating prior cardiac surgery  Valvular replacement noted  Cardiomediastinal silhouette appears unremarkable  Trace bilateral pleural effusions  Mild pulmonary vascular congestion  Osseous structures appear within normal limits for patient age  Impression Mild CHF with trace bilateral pleural effusions similar to prior study  Workstation performed: [id number]       Results for orders placed during the hospital encounter of 01/17/19   XR chest pa & lateral    Narrative CHEST     INDICATION:   History of CHF with some mild wheezing  COMPARISON:  Chest radiographs January 12, 2019    EXAM PERFORMED/VIEWS:  XR CHEST PA & LATERAL  Images: 2    FINDINGS:  The heart is enlarged  Atherosclerotic changes in the aorta  Median sternotomy with valve replacement  The lungs are clear  No pneumothorax or pleural effusion  Thoracic vertebral compression deformities are noted, unchanged when compared to prior examination  Impression No acute cardiopulmonary disease  Workstation performed: [id number]       No results found for this or any previous visit  No results found for this or any previous visit    No results found for this or any previous visit  No results found for this or any previous visit  Portions of the record may have been created with voice recognition software  Occasional wrong word or "sound a like" substitutions may have occurred due to the inherent limitations of voice recognition software  Read the chart carefully and recognize, using context, where substitutions have occurred

## 2019-01-26 NOTE — PROGRESS NOTES
Physical Medicine and Rehabilitation Progress Note  Aamir Brown 80 y o  female MRN: [id number]  Unit/Bed#: -01 Encounter: [id number]    HPI: Anatoly Vargas a 80 y  o  female who presented to the [facility] as a trauma following a fall on Plavix  She utilizes a SPC at baseline, and had hit a wet patch on the floor causing the cane to slip from under her  Her PMH is significant for HTN with history of CVA, AVR, HTN, and T2DM  CT C-Spine revealed a Type 3 Odontoid fracture extending to the R and L superior articular facets and L transverse foramen  She also had an acute non-displaced C6 fracture  Treated non-op  Course c/b by fluid overload, hospital delirium, and  Pain  These are improving  She was evaluated by PT/OT and determined to be appropriate for discharge to the [facility] on 1/17/19  Chief Complaint: "I'm doing well"      Interval: Patient s/e today at bedside  Overall, feeling as though her pain has been improving throughout her stay  Has not changed in quality, but has improved in regards to intensity  Only utilized Oxycodone 3x yesterday  Otherwise she denies any CP, SOB, new numbness/tingling/weakness, abdominal discomfort, N/V      ROS:  Negative except for what is noted in HPI/CC/Sbuj    Assessment/Plan:      * Ambulatory dysfunction   Assessment & Plan    Patient will participate in a comprehensive inpatient rehab program with 3-5 hours a day 5-7 days a week of PT/OT  To evaluate for any further SLP needs  - Continue pain management  - Fall precautions     C6 cervical fracture (HCC)   Assessment & Plan    Non-op management  - Pain control as noted above in Type III odontoid fracture  - PT/OT     Type III fracture of odontoid process [facility])   Assessment & Plan    Please see ambulatory dysfunction  C-Collar ATC  Current collar keeps her stable, but does seem a little too big, unfortunately Pediatric collar doesn't fit   Seen by orthotics here with minimal improvement  - Pain management - goal to wean off of opioids  Emphasized today  - Will make Robaxin scheduled  - Decreased frequency of oxycodone 2 5mg to Q6hr  - Discontinued breakthrough  - Continue Tylenol ATC  - Lidocaine patch  - Cervical precautions  - Will need follow-up with Neurosurgery on 1/28/19    - The pain she is having may have a myofascial component  Hyponatremia   Assessment & Plan    1/26 Na 129 from 132  Continue to check BMP daily  - 1200 mL fluid restriction  - Start sodium chloride tabs today pending Nephrology remarks  - CTCAP to rule out other causes - unremarkable except for possible pulmonary nodule  - CTH unremarkable except for old, stable lacunar infarct  - Remainder of work-up unrevealing  Nephro recs appreciated  History of aortic valve replacement with bioprosthetic valve   Assessment & Plan    Monitor cardiopulmonary status  IM to manage     Fibromyalgia   Assessment & Plan    Continue sertraline   - Would strongly consider a switch to Duloxetine or another SNRI as an outpatient as this is approved for chronic pain syndromes, and may help with her central sensitization from her Fibromyalgia  Continue gabapentin (Renally dosed)  Consult neuropsychology for adjustment and coping skills for pain management  Rheumatoid arthritis involving multiple sites [facility])   Assessment & Plan    Continue plaquenil  - Was also on hydrocodone but titrating off at home  - Monitor for flare  - IM following     Acute renal failure superimposed on chronic kidney disease ([facility] [address] )   Assessment & Plan    Creatinine improved 1/26 to 1 36  Of note, bladder scans have been <150cc previously     Per nephro, may be due to decreased BP, and likely pre-renal  - Hold torsemide one day  - Losartan decreased   - Current dose of gabapentin acceptable given kidney disease   - Follow-up work-up per nephro   · UA  · Urine protein creatinine ratio  · CT ordered of the abdomen and pelvis which should evaluate her kidneys ruling out hydronephrosis  · Avoid hypoperfusion        Depression   Assessment & Plan    Continue sertraline    - Would strongly consider a switch to Duloxetine or another SNRI as an outpatient as this is approved for chronic pain syndromes, and may help with her central sensitization from her Fibromyalgia  Type 2 diabetes mellitus with diabetic polyneuropathy [facility])   Assessment & Plan    Lab Results   Component Value Date    HGBA1C 5 2 01/18/2019       No results for input(s): POCGLU in the last 72 hours  Blood Sugar Average: Last 72 hrs:  - Monitor fasting glucose  - Continue diabetic diet  - At home diet controlled  - Placed on diabetic diet  - IM to manage     History of CVA (cerebrovascular accident)   Assessment & Plan    No tim sequelae  Cannot tolerate statins  Continue Plavix  Continue fish oil  At home on red yeast rice as well  Chronic diastolic heart failure (HCC)   Assessment & Plan    With some volume overload early in hospital stay  Now examines euvolemic  Monitor kidney function on lasix   Continue ARB/BB     Renovascular hypertension   Assessment & Plan    Amlodipine decreased to 5mg  Goal <130/80 but not by much per Nephrology  Increased creatinine, Losartan decreased to50mg  Continue metoprolol tartrate 25mg Q12  Change hydralazine PRN to PO  S/p stenting for DONAVAN  IM/Nephro managing, recs appreciated       # Skin  · Encourage regular turning as patient at risk for skin breakdown  · Staff to continue patient education on Q2h turning     # Bowel  · Patient reports no constipation  · last BM 1/25/19  Continu regimen of Colace, senna, and miralax       # Bladder  · Patient voiding spontaneously    # Pain  · Continue tylenol, for max of 3gm daily  · Continue oxycodone   · Continue methocarbamol  · Continue gabapentin  · Decreased oxycodone, and made Robaxin scheduled      # Rehab Psych   · referral to Rehabilitation Psychology    # Other  - Diet/Nutrition:        Diet Orders            Start     Ordered    01/24/19 1248  Diet Regular; Regular House; Fluid Restriction 1200 ML  Diet effective now     Question Answer Comment   Diet Type Regular    Regular Regular House    Other Restriction(s): Fluid Restriction 1200 ML    RD to adjust diet per protocol? Yes        01/24/19 1247    01/18/19 0721  Room Service  Once     Question:  Type of Service  Answer:  Room Service - Appropriate with Assistance    01/18/19 0720    01/17/19 1654  Dietary nutrition supplements  Once     Question Answer Comment   Select Supplement: Ensure Enlive-Vanilla    Frequency Lunch        01/17/19 1653        - DVT prophy: Sequential compression device (Venodyne)  and Enoxaparin (Lovenox)  - GI ppx: None  - Nausea: None  - Supplements: None  - Sleep: None    Disposition: Team 1/23/19: UE ROM/functional strength are barriers  She may have some cognitive deficits at baseline  OT to check MOCA today  Also having issues with the fit of her Estefani collar for showers  Anticipate that her length of stay may be about 2-3 weeks  Will reteam next week      CODE: Level 1: Full Code   Scheduled Meds:    Current Facility-Administered Medications:  acetaminophen 975 mg Oral [facility] Christine Goldstein MD   albuterol 2 puff Inhalation Q4H PRN Christine Goldstein MD   amLODIPine 5 mg Oral Daily Frank Gandara MD   barium sulfate 450 mL Oral Once in imaging Frank Gandara MD   calcium carbonate-vitamin D 1 tablet Oral BID With Meals Christine Goldstein MD   clopidogrel 75 mg Oral Daily Christine Goldstein MD   docusate sodium 100 mg Oral BID Christine Goldstein MD   enoxaparin 40 mg Subcutaneous Q24H [address] Christine Goldstein MD   fish oil 1,000 mg Oral Daily Christine Goldstein MD   gabapentin 300 mg Oral BID Ewelina Perales PA-C   gabapentin 600 mg Oral HS Christine Goldstein MD   hydroxychloroquine 200 mg Oral Daily With Breakfast Christine Goldtsein MD   levothyroxine 112 mcg Oral Early Morning Christine Goldstein MD   lidocaine 1 patch Topical Daily Neris Gilmore MD   lidocaine 1 patch Topical Daily Neris Gilmore MD   losartan 50 mg Oral HS Bambi Hernandez MD   methocarbamol 500 mg Oral Q6H [address] Neris Gilmore MD   metoprolol tartrate 25 mg Oral Q12H [address] Neris Gilmore MD   nystatin  Topical BID Neris Gilmore MD   oxyCODONE 2 5 mg Oral Q6H PRN Neris Gilmore MD   polyethylene glycol 17 g Oral Daily PRN Neris Gilmore MD   senna 1 tablet Oral HS Neris Gilmore MD   sertraline 25 mg Oral Daily Neris Gilmore MD   trolamine salicylate 1 application Topical 4x Daily PRN Ewelina Perales PA-C        Objective:    Functional Update:   1/25/19 OT: Set-up/Clean-up Eating, oral hygiene, modA sponge bathe, UB dressing, totalA LB dressing, toileting hygiene, modA toilet transfer  1/25/19 PT: CGA-Deondre with transfers, ClS 54' with RW ambulation  Allergies per EMR    Physical Exam:  Temp:  [97 5 °F (36 4 °C)-98 4 °F (36 9 °C)] 97 7 °F (36 5 °C)  HR:  [55-62] 55  Resp:  [18] 18  BP: (126-170)/(60-70) 138/63  SpO2:  [96 %-97 %] 96 %    General: alert, no apparent distress, cooperative and comfortable  HEENT:  Head: Normocephalic, no lesions, without obvious abnormality  C-collar in place  CARDIAC:  regular rate and rhythm, S1, S2 normal, no murmur, click, rub or gallop  LUNGS:  normal air entry, lungs clear to auscultation  ABDOMEN:  soft, non-tender  Bowel sounds normal  No masses, no organomegaly  EXTREMITIES:  BL LE edema, stable  NEURO:   normal without focal findings, mental status, speech normal, alert and oriented x3 and Demonstrates functional strength throughout  PSYCH:  Normal  and Alert and oriented, appropriate affect  INCISION:  None     Diagnostic Studies: Reviewed  CT chest abdomen pelvis wo contrast   Final Result by Jacob Li MD (01/25 1954)      No suspicious appearing masses are seen  There is an approximately 1 cm groundglass attenuation density in the right lower lobe    Suggest reassessment with repeat chest CT in 3-6 months  Relatively mild colonic diverticulosis  Workstation performed: [id number]         CT head wo contrast   Final Result by Sahil Rosales MD (01/25 1943)      No acute intracranial abnormality  Moderate chronic microvascular ischemic change in periventricular white matter and cerebral atrophy  Workstation performed: [id number]         XR chest pa & lateral   Final Result by Sammy Black DO (01/25 5278)   No acute cardiopulmonary disease  Workstation performed: [id number]             Laboratory: Reviewed    Results from last 7 days  Lab Units 01/24/19  0632 01/21/19  1004   HEMOGLOBIN g/dL 10 6* 12 1   HEMATOCRIT % 34 0* 36 7   WBC Thousand/uL 10 28* 6 69       Results from last 7 days  Lab Units 01/26/19  0605 01/25/19  0504 01/24/19  0632   BUN mg/dL 51* 42* 39*   SODIUM mmol/L 129* 132* 131*   POTASSIUM mmol/L 4 6 4 5 4 7   CHLORIDE mmol/L 96* 96* 98*   CREATININE mg/dL 1 36* 1 40* 1 20            Patient Active Problem List   Diagnosis    Closed nondisplaced fracture of second cervical vertebra (HCC)    Neck pain, acute    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)    C6 cervical fracture (HCC)    Hypertension    Hypothyroidism    Fall    Forgetfulness    Ambulatory dysfunction    Physical deconditioning    Acute pain    Delirium    Renovascular hypertension    Chronic diastolic heart failure (HCC)    History of CVA (cerebrovascular accident)    Type 2 diabetes mellitus with diabetic polyneuropathy (HCC)    Depression    Acute renal failure superimposed on chronic kidney disease (HCC)    Rheumatoid arthritis involving multiple sites ([address] )    Fibromyalgia    History of aortic valve replacement with bioprosthetic valve    Hyponatremia    Renal artery stenosis (HCC)    Parenchymal renal hypertension       ** Please Note: Fluency Direct voice to text software may have been used in the creation of this document  **    Total visit time:  At least 25 minutes, with more than 50% spent counseling/coordinating care

## 2019-01-26 NOTE — PROGRESS NOTES
01/26/19 1300   Pain Assessment   Pain Assessment 0-10   Pain Score 2   Pain Type Acute pain   Pain Location Head   Pain Orientation Bilateral   Restrictions/Precautions   Precautions Bed/chair alarms; Fall Risk;Spinal precautions;Supervision on toilet/commode   Weight Bearing Restrictions No   ROM Restrictions Yes   Braces or Orthoses C/S Collar   QI: Sit to Stand   Assistance Needed Physical assistance   Assistance Provided by [place] 50%-74%   Comment Pt requires A to boost up from surface  Sit to Stand CARE Score 2   QI: Chair/Bed-to-Chair Transfer   Assistance Needed Physical assistance   Assistance Provided by [place] 50%-74%   Chair/Bed-to-Chair Transfer CARE Score 2   Transfer Bed/Chair/Wheelchair   Limitations Noted In Balance; Endurance;LE Strength;UE Strength;Pain Management   Adaptive Equipment Roller Walker   Bed, Chair, Wheelchair Transfer (FIM) 3 - [place] needs to lift, boost or assist to stand OR sit   QI: [address] Needed Physical assistance   Assistance Provided by [place] 25%-49%   Henrry Alfaro 83 Score 3   Toileting   Able to [address] down yes, up no  Able to Manage Clothing Closures No   Manage Hygiene Bladder   Limitations Noted In Balance; Coordination; Safety;UE Strength;LE Strength   Adaptive Equipment Grab Bar   Findings During hygiene completion pt noted to have blood tinged area  Nursing notified and present to assess  Applied orange cream and powder for skin integrity  Toileting (FIM) 3 - Patient completes  50-74% of all tasks   QI: Toilet Transfer   Assistance Needed Physical assistance   Assistance Provided by [place] 50%-74%   Toilet Transfer CARE Score 2   Toilet Transfer   Surface Assessed Raised Toilet   Transfer Technique Standard   Limitations Noted In Balance; Endurance; Safety;UE Strength;LE Strength   Adaptive Equipment Grab Bar   Toilet Transfer (FIM) 3 - [place] needs to lift, boost or assist to stand OR sit   Cognition   Overall Cognitive Status Impaired   Arousal/Participation Alert; Cooperative   Attention Attends with cues to redirect   Orientation Level Oriented X4   Memory Decreased recall of precautions   Following Commands Follows one step commands with increased time or repetition   Activity Tolerance   Activity Tolerance Patient tolerated treatment well   Assessment   Treatment Assessment Pt participated in skilled OT services with focus on informal family training session on C collar management, and toileting tasks  Pt's daughter, Vane Hernandez, present for family training  Per her request we focused session on collar management  Handout issued at end of session  Educated on c collar pad change and on importance of keeping head immobilized during collar change  Pt's daughter verbalizes understanding and states that she can have her  A with collar change as well so that one person can assure she doesn't move while the other completes the change  She get's hands on with the actual pad change this session on the practice collar and demos G understanding but does not get hands on at this time with the actual collar change  Pt will continue to benefit from skilled OT services with focus on further family training, endurance, strengthening, and standing balance  OT Family training done with: Pt's daughter Vane Hernandez  Assessment of family training Pt's daughter very receptive and asks appropriate questions  To continue training on Tuesday at 10AM    Prognosis Fair   Problem List Decreased endurance; Impaired balance;Decreased mobility; Decreased coordination;Decreased safety awareness   Plan   Treatment/Interventions ADL retraining;Functional transfer training; Therapeutic exercise; Endurance training;Equipment eval/education; Compensatory technique education   Progress Progressing toward goals   Recommendation   OT Discharge Recommendation Home with family support   OT Therapy Minutes   OT Time In 1300   OT Time Out 1409   OT Total Time (minutes) 81 OT Mode of treatment - Individual (minutes) 69   OT Mode of treatment - Concurrent (minutes) 0   OT Mode of treatment - Group (minutes) 0   OT Mode of treatment - Co-treat (minutes) 0   OT Mode of Teatment - Total time(minutes) 69 minutes   Therapy Time missed   Time missed?  No

## 2019-01-26 NOTE — PROGRESS NOTES
01/26/19 0830   Pain Assessment   Pain Assessment 0-10   Pain Score 5   Pain Type Acute pain   Pain Location Head;Neck   Pain Orientation Bilateral   Pain Descriptors Aching   Pain Frequency Intermittent   Pain Onset Ongoing   Clinical Progression Gradually improving   Patient's Stated Pain Goal No pain   Hospital Pain Intervention(s) Medication (See MAR); Rest;Repositioned   Response to Interventions tolerating well   Restrictions/Precautions   Precautions Bed/chair alarms; Fall Risk;Spinal precautions;Supervision on toilet/commode   Weight Bearing Restrictions No   Braces or Orthoses C/S Collar   Cognition   Overall Cognitive Status Impaired   Arousal/Participation Alert; Cooperative   Attention Attends with cues to redirect   Orientation Level Oriented X4   Memory Decreased recall of precautions   Following Commands Follows one step commands with increased time or repetition   Subjective   Subjective c/o 5/10 pain in her neck and head during therapy session but she was still able to tolerate the entire therapy session  Patient reported she had her pain meds prior to therapy session  QI: Roll Left and Right   Reason if not Attempted Activity not applicable   Roll Left and Right CARE Score 9   QI: Sit to Stand   Assistance Needed Supervision   Assistance Provided by [place] No physical assistance   Sit to Stand CARE Score 4   QI: Chair/Bed-to-Chair Transfer   Assistance Needed Supervision   Assistance Provided by [place] No physical assistance   Comment CS using RW   Chair/Bed-to-Chair Transfer CARE Score 4   Transfer Bed/Chair/Wheelchair   Limitations Noted In Balance; Endurance;Pain Management; Sequencing;LE Strength   Adaptive Equipment Roller Walker   Stand Pivot Supervision  (CS)   Sit to Stand Supervision  (CS)   Stand to Дмитрий Controls Supervision  (CS)   Bed, Chair, Wheelchair Transfer (FIM) 5 - Patient requires supervision/monitoring   QI: Walk 10 Feet   Assistance Needed Supervision   Assistance Provided by [place] No physical assistance   Comment CS using RW   Walk 10 Feet CARE Score 4   QI: Walk 50 Feet with Two [address] Provided by [place] No physical assistance   Comment CS using RW   Walk 50 Feet with Two Turns CARE Score 4   QI: Walk 150 Feet   Assistance Needed Supervision   Assistance Provided by [place] No physical assistance   Comment CS using RW   Walk 150 Feet CARE Score 4   QI: Walking 10 Feet on Uneven Surfaces   Reason if not Attempted Safety concerns   Walking 10 Feet on Uneven Surfaces CARE Score 88   Ambulation   Does the patient walk? 2  Yes   Primary Discharge Mode of Locomotion Walk   Walk Assist Level Close Supervision   Gait Pattern Inconsistant Jenny; Slow Jenny;Decreased foot clearance; Wide JEFE; Improper weight shift   Assist Device Roller Walker   Distance Walked (feet) 150 ft  (150 feet x 2)   Limitations Noted In Balance; Endurance;Speed;Strength;Swing   Walking (FIM) 5 - Patient requires supervision/monitoring AND distance 150 feet or more, no rest   Wheelchair mobility   QI: Does the patient use a wheelchair? 0   No   QI: 1 Step (Curb)   Assistance Needed Incidental touching   Assistance Provided by [place] Less than 25%   Comment CG using RW   1 Step (Curb) CARE Score 3   QI: 4 Steps   Assistance Needed Physical assistance   Assistance Provided by [place] 25%-49%   Comment Min Assist   4 Steps CARE Score 3   QI: 12 Steps   Reason if not Attempted Activity not applicable   12 Steps CARE Score 9   Stairs   Type Curb;Stairs   # of Steps 4   Weight Bearing Precautions Fall Risk   Assist Devices Bilateral Rail;Roller Walker   Stairs (FIM) 2 - Patient goes up and down 4 - 11 stairs regardless of assist/device/setup   QI: Picking Up Object   Reason if not Attempted Safety concerns   Picking Up Object CARE Score 88   Therapeutic Interventions   Strengthening Seated ther ex on BLE's using 2 5# ankle weights for LAQ, hip flexion, hip adduction with ball squeezes, gluteal sets, ankle DF/PF for 3 sets of 10 reps each  Equipment Use   NuStep Level 2 x 10 minutes   Assessment   Treatment Assessment Patient tolerated therapy very well with rest breaks in between  She was very pleasant and very cooperative the entire therapy session  She ambulated at a steady but slow pace  No LOB noted  She ambulated 150 feet x 2 with Close Supervision using RW  CS with all transfers  CG using RW with curb step  Ascended/descended 4 steps with B/L HR with Min Assist  She will benefit from continued skilled PT services to improve BLE's strength to facilitate safety and [place] with transfers and gait, to improve endurance with gait and improve standing balance to reduce risk for falls  Problem List Decreased strength;Decreased endurance; Impaired balance;Decreased mobility; Decreased safety awareness;Orthopedic restrictions;Pain   Barriers to Discharge Inaccessible home environment;Decreased caregiver support   PT Barriers   Physical Impairment Decreased strength;Decreased endurance; Impaired balance;Decreased mobility; Decreased safety awareness;Orthopedic restrictions;Pain   Functional Limitation Car transfers;Stair negotiation;Standing;Transfers; Walking   Plan   Treatment/Interventions Functional transfer training;LE strengthening/ROM; Elevations; Therapeutic exercise; Endurance training;Bed mobility;Gait training   Recommendation   Recommendation 24 hour supervision/assist;Home PT; Home with family support   Equipment Recommended Walker   PT Therapy Minutes   PT Time In 0830   PT Time Out 1000   PT Total Time (minutes) 90   PT Mode of treatment - Individual (minutes) 90   PT Mode of treatment - Concurrent (minutes) 0   PT Mode of treatment - Group (minutes) 0   PT Mode of treatment - Co-treat (minutes) 0   PT Mode of Teatment - Total time(minutes) 90 minutes   Therapy Time missed   Time missed?  No

## 2019-01-27 LAB
ANION GAP SERPL CALCULATED.3IONS-SCNC: 5 MMOL/L (ref 4–13)
BUN SERPL-MCNC: 49 MG/DL (ref 5–25)
CALCIUM SERPL-MCNC: 9 MG/DL (ref 8.3–10.1)
CHLORIDE SERPL-SCNC: 100 MMOL/L (ref 100–108)
CO2 SERPL-SCNC: 31 MMOL/L (ref 21–32)
CREAT SERPL-MCNC: 1.32 MG/DL (ref 0.6–1.3)
GFR SERPL CREATININE-BSD FRML MDRD: 37 ML/MIN/1.73SQ M
GLUCOSE SERPL-MCNC: 82 MG/DL (ref 65–140)
POTASSIUM SERPL-SCNC: 4.8 MMOL/L (ref 3.5–5.3)
SODIUM SERPL-SCNC: 136 MMOL/L (ref 136–145)

## 2019-01-27 PROCEDURE — 97110 THERAPEUTIC EXERCISES: CPT

## 2019-01-27 PROCEDURE — 97530 THERAPEUTIC ACTIVITIES: CPT

## 2019-01-27 PROCEDURE — 80048 BASIC METABOLIC PNL TOTAL CA: CPT | Performed by: INTERNAL MEDICINE

## 2019-01-27 PROCEDURE — 97535 SELF CARE MNGMENT TRAINING: CPT

## 2019-01-27 PROCEDURE — 99232 SBSQ HOSP IP/OBS MODERATE 35: CPT | Performed by: INTERNAL MEDICINE

## 2019-01-27 PROCEDURE — 97116 GAIT TRAINING THERAPY: CPT

## 2019-01-27 RX ORDER — SODIUM CHLORIDE 1000 MG
1 TABLET, SOLUBLE MISCELLANEOUS 2 TIMES DAILY WITH MEALS
Status: DISCONTINUED | OUTPATIENT
Start: 2019-01-27 | End: 2019-01-29

## 2019-01-27 RX ADMIN — CLOPIDOGREL BISULFATE 75 MG: 75 TABLET ORAL at 07:34

## 2019-01-27 RX ADMIN — ACETAMINOPHEN 975 MG: 325 TABLET, FILM COATED ORAL at 14:03

## 2019-01-27 RX ADMIN — OXYCODONE HYDROCHLORIDE 2.5 MG: 5 TABLET ORAL at 19:45

## 2019-01-27 RX ADMIN — LIDOCAINE 1 PATCH: 50 PATCH CUTANEOUS at 08:40

## 2019-01-27 RX ADMIN — GABAPENTIN 300 MG: 300 CAPSULE ORAL at 14:03

## 2019-01-27 RX ADMIN — NYSTATIN 1 APPLICATION: 100000 POWDER TOPICAL at 08:38

## 2019-01-27 RX ADMIN — AMLODIPINE BESYLATE 5 MG: 5 TABLET ORAL at 07:34

## 2019-01-27 RX ADMIN — ACETAMINOPHEN 975 MG: 325 TABLET, FILM COATED ORAL at 21:41

## 2019-01-27 RX ADMIN — DOCUSATE SODIUM 100 MG: 100 CAPSULE, LIQUID FILLED ORAL at 18:24

## 2019-01-27 RX ADMIN — METHOCARBAMOL 500 MG: 500 TABLET, FILM COATED ORAL at 18:24

## 2019-01-27 RX ADMIN — ACETAMINOPHEN 975 MG: 325 TABLET, FILM COATED ORAL at 05:38

## 2019-01-27 RX ADMIN — GABAPENTIN 600 MG: 300 CAPSULE ORAL at 21:40

## 2019-01-27 RX ADMIN — SODIUM CHLORIDE TAB 1 GM 2 G: 1 TAB at 07:36

## 2019-01-27 RX ADMIN — OXYCODONE HYDROCHLORIDE 2.5 MG: 5 TABLET ORAL at 07:34

## 2019-01-27 RX ADMIN — GABAPENTIN 300 MG: 300 CAPSULE ORAL at 07:34

## 2019-01-27 RX ADMIN — METHOCARBAMOL 500 MG: 500 TABLET, FILM COATED ORAL at 05:38

## 2019-01-27 RX ADMIN — LEVOTHYROXINE SODIUM 112 MCG: 112 TABLET ORAL at 05:38

## 2019-01-27 RX ADMIN — CALCIUM CARBONATE 500 MG (1,250 MG)-VITAMIN D3 200 UNIT TABLET 1 TABLET: at 07:33

## 2019-01-27 RX ADMIN — METOPROLOL TARTRATE 25 MG: 25 TABLET, FILM COATED ORAL at 21:41

## 2019-01-27 RX ADMIN — SERTRALINE HYDROCHLORIDE 25 MG: 25 TABLET ORAL at 21:41

## 2019-01-27 RX ADMIN — LIDOCAINE 1 PATCH: 50 PATCH CUTANEOUS at 07:35

## 2019-01-27 RX ADMIN — CALCIUM CARBONATE 500 MG (1,250 MG)-VITAMIN D3 200 UNIT TABLET 1 TABLET: at 16:30

## 2019-01-27 RX ADMIN — ENOXAPARIN SODIUM 40 MG: 40 INJECTION SUBCUTANEOUS at 07:35

## 2019-01-27 RX ADMIN — LOSARTAN POTASSIUM 50 MG: 50 TABLET, FILM COATED ORAL at 21:41

## 2019-01-27 RX ADMIN — METHOCARBAMOL 500 MG: 500 TABLET, FILM COATED ORAL at 00:21

## 2019-01-27 RX ADMIN — OXYCODONE HYDROCHLORIDE 2.5 MG: 5 TABLET ORAL at 00:21

## 2019-01-27 RX ADMIN — METOPROLOL TARTRATE 25 MG: 25 TABLET, FILM COATED ORAL at 07:33

## 2019-01-27 RX ADMIN — STANDARDIZED SENNA CONCENTRATE 8.6 MG: 8.6 TABLET ORAL at 21:42

## 2019-01-27 RX ADMIN — HYDROXYCHLOROQUINE SULFATE 200 MG: 200 TABLET, FILM COATED ORAL at 07:36

## 2019-01-27 RX ADMIN — METHOCARBAMOL 500 MG: 500 TABLET, FILM COATED ORAL at 11:43

## 2019-01-27 RX ADMIN — DOCUSATE SODIUM 100 MG: 100 CAPSULE, LIQUID FILLED ORAL at 07:34

## 2019-01-27 RX ADMIN — SODIUM CHLORIDE TAB 1 GM 1 G: 1 TAB at 16:28

## 2019-01-27 RX ADMIN — NYSTATIN: 100000 POWDER TOPICAL at 19:49

## 2019-01-27 RX ADMIN — OXYCODONE HYDROCHLORIDE 2.5 MG: 5 TABLET ORAL at 14:09

## 2019-01-27 RX ADMIN — Medication 1000 MG: at 07:36

## 2019-01-27 NOTE — PROGRESS NOTES
NEPHROLOGY PROGRESS NOTE   Samir Ordoñez 80 y o  female MRN: [id number]  Unit/Bed#: -01 Encounter: [id number]  Reason for Consult: CKD, hyponatremia    ASSESSMENT AND PLAN:  27-year-old female with history of diabetes mellitus/hypertension/renal artery disease/hypothyroidism/ambulatory dysfunction/CVA/rheumatoid arthritis/fibromyalgia/aortic valve replacement who presents with a fall slipping  She has a T3 fracture extending to the right and left superior articular facets and left transfers foramen  Also an acute nondisplaced C6 fracture  She is admitted for rehabilitation  We are asked to follow her for hyponatremia     Hyponatremia could be secondary to component of hypervolemia/SIADH  -serum sodium has improved at 136 Today   -strict fluid restriction 1 2 L per day, decrease salt tablet to 1 g p  O  B i d   Continue to hold diuretics for today  Consider initiating diuretics in next 24 to 48 hr     Workup:  · ·           Urine for sodium:  56 but that was on furosemide so inconclusive  · ·           Urine for osmolality:  388  · ·           Serum for osmolality:  287 borderline  · ·           TSH:  3 0 acceptable  · ·           A m  Cortisol:  17 8 therefore negative adrenal insufficiency  · ·           Uric acid:  5 9 more likely related to prerenal  · ·           Chest x-ray:  No acute disease no evidence of CHF     Mild HERLINDA on CKD stage 3, baseline creatinine around one  Creatinine has peaked at 1 4 which has now slightly improved at 1 3 and stable   -continue to monitor renal function  Diuretics is currently being held   -avoid nephrotoxins or NSAIDs  -allows SBP 130s to 140s  -urinalysis shows no significant hematuria or proteinuria  -CT scan shows no hydronephrosis  -bladder scan nonsignificant   -losartan reduced      Hypertension, blood pressure overall acceptable with occasional high readings  Renal artery disease status post renal artery stent  Losartan reduced recently    Diuretics is on hold for now  May consider restarting diuretics in next 24 hr depending on renal function and volume status  SUBJECTIVE:  Patient seen and examined at bedside  No chest pain, shortness of breath, nausea, vomiting, abdominal pain or diarrhea  No urinary complaints       OBJECTIVE:  Current Weight: Weight - Scale: 84 1 kg (185 lb 6 5 oz)  Vitals:    01/27/19 0514   BP: 152/65   Pulse: 77   Resp: 19   Temp: 98 7 °F (37 1 °C)   SpO2: 92%       Intake/Output Summary (Last 24 hours) at 01/27/19 1303  Last data filed at 01/27/19 1230   Gross per 24 hour   Intake              897 ml   Output              250 ml   Net              647 ml       Physical Examination:  General:  Sitting in chair, no acute distress   Eyes:  Mild conjunctival pallor present  ENT:  External examination of ears and nose unremarkable  Neck:  Neck collar present  Respiratory:  Bilateral air entry present  CVS:  S1, S2 present  GI:  Soft, nontender, nondistended  CNS:  Active alert oriented x3  Extremities:  No significant pitting edema in legs  Skin:  No new rash in legs    Medications:    Current Facility-Administered Medications:     acetaminophen (TYLENOL) tablet 975 mg, 975 mg, Oral, Q8H [facility], Ishna Herman MD, 975 mg at 01/27/19 0538    albuterol (PROVENTIL HFA,VENTOLIN HFA) inhaler 2 puff, 2 puff, Inhalation, Q4H PRN, Ishan Herman MD    amLODIPine (NORVASC) tablet 5 mg, 5 mg, Oral, Daily, Beatris Hatchet, MD, 5 mg at 01/27/19 0734    barium sulfate 2 1 % suspension 450 mL, 450 mL, Oral, Once in imaging, Beatris Hatchet, MD    calcium carbonate-vitamin D (OSCAL-D) 500 mg-200 units per tablet 1 tablet, 1 tablet, Oral, BID With Meals, Ishan Herman MD, 1 tablet at 01/27/19 0733    clopidogrel (PLAVIX) tablet 75 mg, 75 mg, Oral, Daily, Ishan Herman MD, 75 mg at 01/27/19 0734    docusate sodium (COLACE) capsule 100 mg, 100 mg, Oral, BID, Ishan Herman MD, 100 mg at 01/27/19 0734    enoxaparin (LOVENOX) subcutaneous injection 40 mg, 40 mg, Subcutaneous, Q24H [address], Vidal Lr MD, 40 mg at 01/27/19 4595    fish oil capsule 1,000 mg, 1,000 mg, Oral, Daily, Vidal Lr MD, 1,000 mg at 01/27/19 0736    gabapentin (NEURONTIN) capsule 300 mg, 300 mg, Oral, BID, Ewelina Perales PA-C, 300 mg at 01/27/19 4301    gabapentin (NEURONTIN) capsule 600 mg, 600 mg, Oral, HS, Vidal Lr MD, 600 mg at 01/26/19 2115    hydroxychloroquine (PLAQUENIL) tablet 200 mg, 200 mg, Oral, Daily With Breakfast, Vidal Lr MD, 200 mg at 01/27/19 0736    levothyroxine tablet 112 mcg, 112 mcg, Oral, Early Morning, Vidal Lr MD, 112 mcg at 01/27/19 0538    lidocaine (LIDODERM) 5 % patch 1 patch, 1 patch, Topical, Daily, Vidal Lr MD, 1 patch at 01/27/19 0840    lidocaine (LIDODERM) 5 % patch 1 patch, 1 patch, Topical, Daily, Vidal Lr MD, 1 patch at 01/27/19 0840    losartan (COZAAR) tablet 50 mg, 50 mg, Oral, HS, Shane Brown MD, 50 mg at 01/26/19 2115    methocarbamol (ROBAXIN) tablet 500 mg, 500 mg, Oral, Q6H [address], Vidal Lr MD, 500 mg at 01/27/19 1143    metoprolol tartrate (LOPRESSOR) tablet 25 mg, 25 mg, Oral, Q12H [address], Vidal Lr MD, 25 mg at 01/27/19 5986    nystatin (MYCOSTATIN) powder, , Topical, BID, Vidal Lr MD, 1 application at 05/71/27 0838    oxyCODONE (ROXICODONE) IR tablet 2 5 mg, 2 5 mg, Oral, Q6H PRN, Vidal Lr MD, 2 5 mg at 01/27/19 0734    polyethylene glycol (MIRALAX) packet 17 g, 17 g, Oral, Daily PRN, Vidal Lr MD    [facility]) tablet 8 6 mg, 1 tablet, Oral, HS, Vidal Lr MD, 8 6 mg at 01/26/19 2115    sertraline (ZOLOFT) tablet 25 mg, 25 mg, Oral, Daily, Vidal Lr MD, 25 mg at 01/26/19 2115    sodium chloride tablet 2 g, 2 g, Oral, BID With Meals, Flavia Butler MD, 2 g at 01/27/19 0736    trolamine salicylate (ASPERCREME) 10 % cream 1 application, 1 application, Topical, 4x Daily PRN, Ewelina Perales PA-C, 1 application at 75/71/34 1533    Laboratory Results:    Results from last 7 days  Lab Units 01/27/19  0510 01/26/19  0605 01/25/19  0504 01/24/19  7615 01/22/19  0527 01/21/19  1004   WBC Thousand/uL  --   --   --  10 28*  --  6 69   HEMOGLOBIN g/dL  --   --   --  10 6*  --  12 1   HEMATOCRIT %  --   --   --  34 0*  --  36 7   PLATELETS Thousands/uL  --   --   --  206  --  205   POTASSIUM mmol/L 4 8 4 6 4 5 4 7 4 5 4 1   CHLORIDE mmol/L 100 96* 96* 98* 98* 98*   CO2 mmol/L 31 25 29 27 26 27   BUN mg/dL 49* 51* 42* 39* 34* 30*   CREATININE mg/dL 1 32* 1 36* 1 40* 1 20 1 07 0 99   CALCIUM mg/dL 9 0 8 9 9 0 9 1 8 9 9 1   MAGNESIUM mg/dL  --   --  2 6  --   --   --        Results for orders placed during the hospital encounter of 01/09/19   XR chest portable    Narrative CHEST     INDICATION:   pulm congestion  COMPARISON:  January 10, 2019    EXAM PERFORMED/VIEWS:  XR CHEST PORTABLE      FINDINGS:  There are median sternotomy wires indicating prior cardiac surgery  Valvular replacement noted  Cardiomediastinal silhouette appears unremarkable  Trace bilateral pleural effusions  Mild pulmonary vascular congestion  Osseous structures appear within normal limits for patient age  Impression Mild CHF with trace bilateral pleural effusions similar to prior study  Workstation performed: [id number]       Results for orders placed during the hospital encounter of 01/17/19   XR chest pa & lateral    Narrative CHEST     INDICATION:   History of CHF with some mild wheezing  COMPARISON:  Chest radiographs January 12, 2019    EXAM PERFORMED/VIEWS:  XR CHEST PA & LATERAL  Images: 2    FINDINGS:  The heart is enlarged  Atherosclerotic changes in the aorta  Median sternotomy with valve replacement  The lungs are clear  No pneumothorax or pleural effusion  Thoracic vertebral compression deformities are noted, unchanged when compared to prior examination  Impression No acute cardiopulmonary disease          Workstation performed: [id number]       No results found for this or any previous visit  No results found for this or any previous visit  No results found for this or any previous visit  No results found for this or any previous visit  Portions of the record may have been created with voice recognition software  Occasional wrong word or "sound a like" substitutions may have occurred due to the inherent limitations of voice recognition software  Read the chart carefully and recognize, using context, where substitutions have occurred

## 2019-01-27 NOTE — PROGRESS NOTES
01/27/19 1500   Pain Assessment   Pain Assessment 0-10   Pain Score 5   Pain Type Acute pain   Pain Location Head;Neck   Pain Descriptors Aching;Pressure; Headache;Heaviness   Pain Frequency Constant/continuous   Hospital Pain Intervention(s) Medication (See MAR); Repositioned; Rest   Restrictions/Precautions   Precautions Bed/chair alarms; Fall Risk;Spinal precautions;Supervision on toilet/commode   Weight Bearing Restrictions No   ROM Restrictions Yes   Braces or Orthoses C/S Collar   Cognition   Overall Cognitive Status Impaired   Arousal/Participation Alert; Cooperative   Attention Attends with cues to redirect   Orientation Level Oriented X4   Memory Decreased recall of precautions   Following Commands Follows one step commands with increased time or repetition   QI: Sit to [address] Provided by [place] No physical assistance   Comment First attempt pt challenged, with cues for pre-tx set-up able to execute at supervision level   Sit to Stand CARE Score 4   Transfer Bed/Chair/Wheelchair   Bed, Chair, Wheelchair Transfer (FIM) 4 - Patient requires steadying assist or light touching   QI: Walk 10 Feet   Assistance Needed Supervision; Adaptive equipment   Assistance Provided by [place] No physical assistance   Walk 10 Feet CARE Score 4   QI: Walk 50 Feet with Two Turns   Assistance Needed Supervision; Adaptive equipment   Assistance Provided by [place] No physical assistance   Walk 50 Feet with Two Turns CARE Score 4   QI: Walk 150 Feet   Assistance Needed Supervision; Adaptive equipment   Assistance Provided by [place] No physical assistance   Walk 150 Feet CARE Score 4   Ambulation   Does the patient walk? 2  Yes   Primary Discharge Mode of Locomotion Walk   Walk Assist Level Supervision   Gait Pattern Slow Jenny;Decreased foot clearance   Assist Device Roller Walker   Distance Walked (feet) 300 ft   Limitations Noted In Speed;Strength;Swing;Endurance; Heel Strike   Walking (FIM) 5 - Patient requires supervision/monitoring AND distance 150 feet or more, no rest   Wheelchair mobility   QI: Does the patient use a wheelchair? 0  No   QI: 4 Steps   Assistance Needed Incidental touching; Adaptive equipment   Assistance Provided by [place] No physical assistance   4 Steps CARE Score 4   QI: 12 Steps   Reason if not Attempted Activity not applicable   12 Steps CARE Score 9   Stairs   Type Stairs   # of Steps 6  (3 x2 )   Weight Bearing Precautions Fall Risk;Cervical   Assist Devices Bilateral Rail   Findings Non reciprocal pattern with B HR assist, appropriate eccentric control for safe descent   Stairs (FIM) 2 - Patient goes up and down 4 - 11 stairs regardless of assist/device/setup   Therapeutic Interventions   Strengthening TE once seated in recliner after ambulation and elevations: LAQ, hip flexion, ankle pumps 3 x10 each   Assessment   Treatment Assessment Pt participated in brief skilled PT session focusing on ambulation tolerance over short community distances, negotiation of stairs, and therapeutic exercise  Pt complaining of significant headache and neck pain on entry, however agreeable to participate  Gait speed remains slow however pt is a safe RW user, ambulating 300 feet at supervision level, actively conversing while walking with no deterioration in balance  Stairs performed at supervision level with verbal cueing to place as much of lead foot onto stair riser prior to advancing opposite leg as pt does have tendency to only achieve forefoot contact before proceeding  Second trial with improved safety practices  Pt will continue to benefit from PT intervention to maximize safety  with all functional mobility and activity tolerance/endurance while progressing towards d/c  Problem List Decreased endurance; Impaired balance;Decreased mobility; Decreased coordination;Decreased safety awareness;Decreased skin integrity;Orthopedic restrictions;Decreased cognition; Impaired judgement;Decreased strength   Barriers to Discharge Inaccessible home environment;Decreased caregiver support   PT Barriers   Physical Impairment Decreased strength;Decreased endurance; Impaired balance;Decreased mobility; Decreased safety awareness;Orthopedic restrictions;Pain   Functional Limitation Car transfers;Stair negotiation;Standing;Transfers; Walking   Plan   Treatment/Interventions Functional transfer training;LE strengthening/ROM; Elevations; Therapeutic exercise; Endurance training;Gait training;Bed mobility; Patient/family training   Progress Progressing toward goals   PT Therapy Minutes   PT Time In 1500   PT Time Out 1530   PT Total Time (minutes) 30   PT Mode of treatment - Individual (minutes) 30   PT Mode of treatment - Concurrent (minutes) 0   PT Mode of treatment - Group (minutes) 0   PT Mode of treatment - Co-treat (minutes) 0   PT Mode of Teatment - Total time(minutes) 30 minutes   Therapy Time missed   Time missed?  No

## 2019-01-27 NOTE — PROGRESS NOTES
Internal Medicine Progress Note  Patient: Heidi Leon  Age/sex: 80 y o  female  Medical Record #: [id number]      ASSESSMENT/PLAN:  Heidi Leon is seen and examined and management for following issues:    Type III odontoid fracture with fracture line extending to the right/left superior articular facets and left transverse foramen of C2; nondisplaced fracture through bulky, bridging osteophytes at the C6 level:  required no surgery  Continue collar  Pt reported severe Rt facial pain on 1/19 and an afternoon dose of gabapentin was added  Facial pain resolved      HTN: on Norvasc 10mg qd, Cozaar 100mg qd, Lopressor 25mg q12hrs = renal decreased Losartan to 50mg qd and the Norvasc to 5mg qd today    Hyponatremia:  Renal is following = added Torsemide but held today; on  FR 1200  CXR was negative and renal getting CT C/A/P negative for apparent etiology for hyponatremia  Need Followup 3-6 months for ground-glass finding lower lung base  Sodium tabs t i d  Added     CKD; baseline 1 1-1 3:  above baseline today so renal decreased Losartan and held Torsemide; for PVRs      Hx bioAVR 2012: had no significant disease when had card cath before surgery     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 2016:  continue Plavix that she has been on since CVA 2005; with her TIA 2016, neuro kept Plavix; she is intol statins     DM diet controlled:   watching fasting blood sugars  Today was 80     Chronic diastolic CHF, LVEF 85%, mild-mod MR/mild TR and norm function AVR 2016:  on Lasix 20mg qd as at home which is now changed to Torsemide 10 mg qd by renal   Follows with Dr Wells Able  Family says LE edema is less than at home    CXR yesterday was negative for CHF     RA/SLE:  continue Plaquenil     HLD: intol to statin; continue Red yeast rice and Fish oil when she goes home     Hypothyroidism:  continue current Levothyroxine     Hx right RA stent: BPs since stent placement is controlled     Depression: Zoloft     Chronic pain/fibromyalgia:  on Hydrocodone at home     Hx breast cancer: follows with Yong Gamez      Subjective:  Out of bed with breakfast denies any complaints      ROS:   GI: denies abdominal pain, change bowel habits or reflux symptoms  Neuro: +Right facial pain  Respiratory: No Cough, SOB  Cardiovascular: No CP, palpitations     Scheduled Meds:    Current Facility-Administered Medications:  acetaminophen 975 mg Oral [facility] Ishan Herman MD   albuterol 2 puff Inhalation Q4H PRN Ishan Herman MD   amLODIPine 5 mg Oral Daily Beatris Hatchet, MD   barium sulfate 450 mL Oral Once in imaging Beatris Hatchet, MD   calcium carbonate-vitamin D 1 tablet Oral BID With Meals Ishan Herman MD   clopidogrel 75 mg Oral Daily Ishan Herman MD   docusate sodium 100 mg Oral BID Ishan Herman MD   enoxaparin 40 mg Subcutaneous Q24H [address] Ishan Herman MD   fish oil 1,000 mg Oral Daily Ishan Herman MD   gabapentin 300 mg Oral BID Ewelina Perales PA-C   gabapentin 600 mg Oral HS Ishan Herman MD   hydroxychloroquine 200 mg Oral Daily With Breakfast Ishan Herman MD   levothyroxine 112 mcg Oral Early Morning Ishan Herman MD   lidocaine 1 patch Topical Daily Ishan Herman MD   lidocaine 1 patch Topical Daily Ishan Herman MD   losartan 50 mg Oral HS Beatris Hatchet, MD   methocarbamol 500 mg Oral Q6H [address] Ishan Herman MD   metoprolol tartrate 25 mg Oral Q12H [address] Ishan Herman MD   nystatin  Topical BID Ishan Herman MD   oxyCODONE 2 5 mg Oral Q6H PRN Ishan Herman MD   polyethylene glycol 17 g Oral Daily PRN Ishan Herman MD   senna 1 tablet Oral HS Ishan Herman MD   sertraline 25 mg Oral Daily Ishan Herman MD   sodium chloride 2 g Oral BID With Meals Matthew Do MD   trolamine salicylate 1 application Topical 4x Daily PRN Ewelina Perales PA-C       Labs:       Results from last 7 days  Lab Units 01/24/19  0632 01/21/19  1004   WBC Thousand/uL 10 28* 6 69   HEMOGLOBIN g/dL 10 6* 12 1   HEMATOCRIT % 34 0* 36 7   PLATELETS Thousands/uL 206 205       Results from last 7 days  Lab Units 01/27/19  0510 01/26/19  0605   SODIUM mmol/L 136 129*   POTASSIUM mmol/L 4 8 4 6   CHLORIDE mmol/L 100 96*   CO2 mmol/L 31 25   BUN mg/dL 49* 51*   CREATININE mg/dL 1 32* 1 36*   CALCIUM mg/dL 9 0 8 9                    [unfilled]    Labs reviewed    Physical Examination:  Vitals:   Vitals:    01/26/19 1344 01/26/19 2001 01/27/19 0508 01/27/19 0514   BP: 138/60 145/65 140/70 152/65   BP Location: Left arm Left arm Left arm Left arm   Pulse: 66 60 56 77   Resp: 18 20 19 19   Temp: 97 5 °F (36 4 °C) 98 °F (36 7 °C) 98 7 °F (37 1 °C) 98 7 °F (37 1 °C)   TempSrc: Oral Oral Oral Oral   SpO2: 97% 95% 96% 92%   Weight:       Height:           HEENT:  No thrush  RESP: CTAB, no R/R/W; resp unlabored  CV: +S1 S2, regular rate, no rubs/murmurs  ABD: soft, NT, ND, normal BS   EXT: edema of LEs L>R  Neuro: AAOx3  [ X ] Total time spent: 30 Mins and greater than 50% of this time was spent counseling/coordinating care  ** Please Note: Dragon 360 Dictation voice to text software may have been used in the creation of this document   **

## 2019-01-28 ENCOUNTER — APPOINTMENT (INPATIENT)
Dept: RADIOLOGY | Facility: HOSPITAL | Age: 84
DRG: 560 | End: 2019-01-28
Payer: COMMERCIAL

## 2019-01-28 LAB
ANION GAP SERPL CALCULATED.3IONS-SCNC: 8 MMOL/L (ref 4–13)
BASOPHILS # BLD AUTO: 0.04 THOUSANDS/ΜL (ref 0–0.1)
BASOPHILS NFR BLD AUTO: 1 % (ref 0–1)
BUN SERPL-MCNC: 47 MG/DL (ref 5–25)
CALCIUM SERPL-MCNC: 8.8 MG/DL (ref 8.3–10.1)
CHLORIDE SERPL-SCNC: 102 MMOL/L (ref 100–108)
CO2 SERPL-SCNC: 28 MMOL/L (ref 21–32)
CREAT SERPL-MCNC: 1.11 MG/DL (ref 0.6–1.3)
EOSINOPHIL # BLD AUTO: 0.16 THOUSAND/ΜL (ref 0–0.61)
EOSINOPHIL NFR BLD AUTO: 3 % (ref 0–6)
ERYTHROCYTE [DISTWIDTH] IN BLOOD BY AUTOMATED COUNT: 13.2 % (ref 11.6–15.1)
GFR SERPL CREATININE-BSD FRML MDRD: 46 ML/MIN/1.73SQ M
GLUCOSE P FAST SERPL-MCNC: 84 MG/DL (ref 65–99)
GLUCOSE SERPL-MCNC: 84 MG/DL (ref 65–140)
HCT VFR BLD AUTO: 34.2 % (ref 34.8–46.1)
HGB BLD-MCNC: 10.8 G/DL (ref 11.5–15.4)
IMM GRANULOCYTES # BLD AUTO: 0.03 THOUSAND/UL (ref 0–0.2)
IMM GRANULOCYTES NFR BLD AUTO: 1 % (ref 0–2)
LYMPHOCYTES # BLD AUTO: 2.31 THOUSANDS/ΜL (ref 0.6–4.47)
LYMPHOCYTES NFR BLD AUTO: 40 % (ref 14–44)
MCH RBC QN AUTO: 32 PG (ref 26.8–34.3)
MCHC RBC AUTO-ENTMCNC: 31.6 G/DL (ref 31.4–37.4)
MCV RBC AUTO: 102 FL (ref 82–98)
MONOCYTES # BLD AUTO: 0.43 THOUSAND/ΜL (ref 0.17–1.22)
MONOCYTES NFR BLD AUTO: 7 % (ref 4–12)
NEUTROPHILS # BLD AUTO: 2.88 THOUSANDS/ΜL (ref 1.85–7.62)
NEUTS SEG NFR BLD AUTO: 48 % (ref 43–75)
NRBC BLD AUTO-RTO: 0 /100 WBCS
PLATELET # BLD AUTO: 193 THOUSANDS/UL (ref 149–390)
PMV BLD AUTO: 9.2 FL (ref 8.9–12.7)
POTASSIUM SERPL-SCNC: 4.6 MMOL/L (ref 3.5–5.3)
RBC # BLD AUTO: 3.37 MILLION/UL (ref 3.81–5.12)
SODIUM SERPL-SCNC: 138 MMOL/L (ref 136–145)
WBC # BLD AUTO: 5.85 THOUSAND/UL (ref 4.31–10.16)

## 2019-01-28 PROCEDURE — 97116 GAIT TRAINING THERAPY: CPT

## 2019-01-28 PROCEDURE — 99233 SBSQ HOSP IP/OBS HIGH 50: CPT | Performed by: NEUROLOGICAL SURGERY

## 2019-01-28 PROCEDURE — 80048 BASIC METABOLIC PNL TOTAL CA: CPT | Performed by: NURSE PRACTITIONER

## 2019-01-28 PROCEDURE — 97530 THERAPEUTIC ACTIVITIES: CPT

## 2019-01-28 PROCEDURE — 99232 SBSQ HOSP IP/OBS MODERATE 35: CPT | Performed by: INTERNAL MEDICINE

## 2019-01-28 PROCEDURE — 99232 SBSQ HOSP IP/OBS MODERATE 35: CPT | Performed by: PHYSICAL MEDICINE & REHABILITATION

## 2019-01-28 PROCEDURE — 85025 COMPLETE CBC W/AUTO DIFF WBC: CPT | Performed by: NURSE PRACTITIONER

## 2019-01-28 PROCEDURE — 97110 THERAPEUTIC EXERCISES: CPT

## 2019-01-28 PROCEDURE — 72040 X-RAY EXAM NECK SPINE 2-3 VW: CPT

## 2019-01-28 PROCEDURE — 97535 SELF CARE MNGMENT TRAINING: CPT

## 2019-01-28 RX ORDER — GABAPENTIN 400 MG/1
400 CAPSULE ORAL EVERY 8 HOURS SCHEDULED
Status: DISCONTINUED | OUTPATIENT
Start: 2019-01-28 | End: 2019-01-28

## 2019-01-28 RX ORDER — FUROSEMIDE 20 MG/1
20 TABLET ORAL DAILY
Status: DISCONTINUED | OUTPATIENT
Start: 2019-01-28 | End: 2019-02-06 | Stop reason: HOSPADM

## 2019-01-28 RX ADMIN — CALCIUM CARBONATE 500 MG (1,250 MG)-VITAMIN D3 200 UNIT TABLET 1 TABLET: at 18:13

## 2019-01-28 RX ADMIN — Medication 1000 MG: at 08:54

## 2019-01-28 RX ADMIN — GABAPENTIN 300 MG: 300 CAPSULE ORAL at 14:41

## 2019-01-28 RX ADMIN — AMLODIPINE BESYLATE 5 MG: 5 TABLET ORAL at 08:52

## 2019-01-28 RX ADMIN — OXYCODONE HYDROCHLORIDE 2.5 MG: 5 TABLET ORAL at 14:42

## 2019-01-28 RX ADMIN — LIDOCAINE 1 PATCH: 50 PATCH CUTANEOUS at 08:51

## 2019-01-28 RX ADMIN — NYSTATIN: 100000 POWDER TOPICAL at 18:14

## 2019-01-28 RX ADMIN — LOSARTAN POTASSIUM 50 MG: 50 TABLET, FILM COATED ORAL at 21:19

## 2019-01-28 RX ADMIN — OXYCODONE HYDROCHLORIDE 2.5 MG: 5 TABLET ORAL at 08:52

## 2019-01-28 RX ADMIN — FUROSEMIDE 20 MG: 20 TABLET ORAL at 14:41

## 2019-01-28 RX ADMIN — ENOXAPARIN SODIUM 40 MG: 40 INJECTION SUBCUTANEOUS at 08:51

## 2019-01-28 RX ADMIN — METHOCARBAMOL 500 MG: 500 TABLET, FILM COATED ORAL at 00:09

## 2019-01-28 RX ADMIN — GABAPENTIN 300 MG: 300 CAPSULE ORAL at 08:50

## 2019-01-28 RX ADMIN — METHOCARBAMOL 500 MG: 500 TABLET, FILM COATED ORAL at 18:13

## 2019-01-28 RX ADMIN — NYSTATIN: 100000 POWDER TOPICAL at 08:54

## 2019-01-28 RX ADMIN — DOCUSATE SODIUM 100 MG: 100 CAPSULE, LIQUID FILLED ORAL at 08:50

## 2019-01-28 RX ADMIN — ACETAMINOPHEN 975 MG: 325 TABLET, FILM COATED ORAL at 14:07

## 2019-01-28 RX ADMIN — METHOCARBAMOL 500 MG: 500 TABLET, FILM COATED ORAL at 06:31

## 2019-01-28 RX ADMIN — CALCIUM CARBONATE 500 MG (1,250 MG)-VITAMIN D3 200 UNIT TABLET 1 TABLET: at 08:50

## 2019-01-28 RX ADMIN — SERTRALINE HYDROCHLORIDE 25 MG: 25 TABLET ORAL at 21:19

## 2019-01-28 RX ADMIN — METHOCARBAMOL 500 MG: 500 TABLET, FILM COATED ORAL at 12:00

## 2019-01-28 RX ADMIN — SODIUM CHLORIDE TAB 1 GM 1 G: 1 TAB at 08:54

## 2019-01-28 RX ADMIN — SODIUM CHLORIDE TAB 1 GM 1 G: 1 TAB at 18:14

## 2019-01-28 RX ADMIN — DOCUSATE SODIUM 100 MG: 100 CAPSULE, LIQUID FILLED ORAL at 18:13

## 2019-01-28 RX ADMIN — CLOPIDOGREL BISULFATE 75 MG: 75 TABLET ORAL at 08:50

## 2019-01-28 RX ADMIN — OXYCODONE HYDROCHLORIDE 2.5 MG: 5 TABLET ORAL at 02:02

## 2019-01-28 RX ADMIN — ACETAMINOPHEN 975 MG: 325 TABLET, FILM COATED ORAL at 21:18

## 2019-01-28 RX ADMIN — GABAPENTIN 500 MG: 400 CAPSULE ORAL at 18:13

## 2019-01-28 RX ADMIN — METOPROLOL TARTRATE 25 MG: 25 TABLET, FILM COATED ORAL at 21:19

## 2019-01-28 RX ADMIN — STANDARDIZED SENNA CONCENTRATE 8.6 MG: 8.6 TABLET ORAL at 21:18

## 2019-01-28 RX ADMIN — ACETAMINOPHEN 975 MG: 325 TABLET, FILM COATED ORAL at 06:31

## 2019-01-28 RX ADMIN — METOPROLOL TARTRATE 25 MG: 25 TABLET, FILM COATED ORAL at 08:52

## 2019-01-28 RX ADMIN — ENOXAPARIN SODIUM 40 MG: 40 INJECTION SUBCUTANEOUS at 08:50

## 2019-01-28 RX ADMIN — HYDROXYCHLOROQUINE SULFATE 200 MG: 200 TABLET, FILM COATED ORAL at 08:54

## 2019-01-28 RX ADMIN — LEVOTHYROXINE SODIUM 112 MCG: 112 TABLET ORAL at 06:31

## 2019-01-28 RX ADMIN — OXYCODONE HYDROCHLORIDE 2.5 MG: 5 TABLET ORAL at 21:19

## 2019-01-28 NOTE — PROGRESS NOTES
Physical Medicine and Rehabilitation Progress Note  Hurman Phoenix 80 y o  female MRN: [id number]  Unit/Bed#: -01 Encounter: [id number]    HPI: Pooja Duque a 80 y  o  female who presented to the [facility] as a trauma following a fall on Plavix  She utilizes a SPC at baseline, and had hit a wet patch on the floor causing the cane to slip from under her  Her PMH is significant for HTN with history of CVA, AVR, HTN, and T2DM  CT C-Spine revealed a Type 3 Odontoid fracture extending to the R and L superior articular facets and L transverse foramen  She also had an acute non-displaced C6 fracture  Treated non-op  Course c/b by fluid overload, hospital delirium, and  Pain  These are improving  She was evaluated by PT/OT and determined to be appropriate for discharge to the [facility] on 1/17/19  Chief Complaint: "I'm doing well"      Interval: Patient s/e today at bedside  Overall, feeling as though her pain has been improving throughout her stay  Has not changed in quality, but has improved in regards to intensity  Only utilized Oxycodone 3x yesterday  Otherwise she denies any CP, SOB, new numbness/tingling/weakness, abdominal discomfort, N/V      ROS:  Negative except for what is noted in HPI/CC/Sbuj    Assessment/Plan:      * Ambulatory dysfunction   Assessment & Plan    Patient will participate in a comprehensive inpatient rehab program with 3-5 hours a day 5-7 days a week of PT/OT  To evaluate for any further SLP needs  - Continue pain management  - Fall precautions     C6 cervical fracture (HCC)   Assessment & Plan    Non-op management  - Pain control as noted above in Type III odontoid fracture  - PT/OT     Type III fracture of odontoid process [facility])   Assessment & Plan    Please see ambulatory dysfunction  C-Collar ATC  Current collar keeps her stable, but does seem a little too big, unfortunately Pediatric collar doesn't fit   Seen by orthotics here with minimal improvement  - Pain management - goal to wean off of opioids  - Will make Robaxin scheduled  - Continue oxycodone 2 5mg to Q6hr  - Continue Tylenol ATC  - Lidocaine patch  - Cervical precautions  - Follow-up XR with Nsx today  Some anterior displacement  Neurosurgery to follow-up regarding CT Scan and possible intervention    - Neurologically stable  - The pain she is having may have a myofascial component  Hyponatremia   Assessment & Plan    Resolved  1/28 138  Continue to check BMP daily  - 1200 mL fluid restriction  - Currently on sodium chloride 1g BID    - CTCAP to rule out other causes - unremarkable except for possible pulmonary nodule  - CTH unremarkable except for old, stable lacunar infarct  - Remainder of work-up unrevealing    - Restarting Lasix 20mg daily and monitoring labs  Nephro recs appreciated  History of aortic valve replacement with bioprosthetic valve   Assessment & Plan    Monitor cardiopulmonary status  IM to manage     Fibromyalgia   Assessment & Plan    Continue sertraline  Increased gabapentin to 500mg BID  Renally dosed  Consult neuropsychology for adjustment and coping skills for pain management  Rheumatoid arthritis involving multiple sites [facility])   Assessment & Plan    Continue plaquenil  - Was also on hydrocodone but titrating off at home  - Monitor for flare  - IM following     Acute renal failure superimposed on chronic kidney disease ([facility] [address] )   Assessment & Plan    Resolved  Crea 1/28 1  11  Per nephro, may be due to decreased BP, and likely pre-renal  - Restarted on Lasix 20mg daily    - Losartan decreased still at 50mg  - Current dose of gabapentin acceptable given kidney disease   - Follow-up work-up per nephro        Depression   Assessment & Plan    Continue sertraline        Type 2 diabetes mellitus with diabetic polyneuropathy [facility])   Assessment & Plan    Lab Results   Component Value Date    HGBA1C 5 2 01/18/2019       No results for input(s): POCGLU in the last 72 hours  Blood Sugar Average: Last 72 hrs:  - Monitor fasting glucose  - Continue diabetic diet  - At home diet controlled  - Placed on diabetic diet  - IM to manage     History of CVA (cerebrovascular accident)   Assessment & Plan    No tim sequelae  Cannot tolerate statins  Continue Plavix  Continue fish oil  At home on red yeast rice as well  Chronic diastolic heart failure (HCC)   Assessment & Plan    With some volume overload early in hospital stay  Now examines euvolemic  Monitor kidney function on lasix   Continue ARB/BB     Renovascular hypertension   Assessment & Plan    Amlodipine decreased to 5mg  Goal <130/80 but not by much per Nephrology  Increased creatinine, Losartan decreased to50mg  Continue metoprolol tartrate 25mg Q12   - Restarting Lasix 20mg daily  Change hydralazine PRN to PO   S/p stenting for DONAVAN  IM/Nephro managing, recs appreciated       # Skin  · Encourage regular turning as patient at risk for skin breakdown  · Staff to continue patient education on Q2h turning     # Bowel  · Patient reports no constipation  · last BM 1/27/19  Continu regimen of Colace, senna, and miralax       # Bladder  · Patient voiding spontaneously    # Pain  · Continue tylenol, for max of 3gm daily  · Continue oxycodone   · Continue methocarbamol  · Continue gabapentin  · Decreased oxycodone, and made Robaxin scheduled  # Rehab Psych   · referral to Rehabilitation Psychology    # Other  - Diet/Nutrition:        Diet Orders            Start     Ordered    01/24/19 1248  Diet Regular; Regular House; Fluid Restriction 1200 ML  Diet effective now     Question Answer Comment   Diet Type Regular    Regular Regular House    Other Restriction(s): Fluid Restriction 1200 ML    RD to adjust diet per protocol?  Yes        01/24/19 1247    01/18/19 0721  Room Service  Once     Question:  Type of Service  Answer:  Room Service - Appropriate with Assistance    01/18/19 8020 01/17/19 1654  Dietary nutrition supplements  Once     Question Answer Comment   Select Supplement: Ensure Enlive-Vanilla    Frequency Lunch        01/17/19 1653        - DVT prophy: Sequential compression device (Venodyne)  and Enoxaparin (Lovenox)  - GI ppx: None  - Nausea: None  - Supplements: None  - Sleep: None    Disposition: Team 1/23/19: UE ROM/functional strength are barriers  She may have some cognitive deficits at baseline  OT to check MOCA today  Also having issues with the fit of her Estefani collar for showers  Anticipate that her length of stay may be about 2-3 weeks  Will reteam next week      CODE: Level 1: Full Code   Scheduled Meds:    Current Facility-Administered Medications:  acetaminophen 975 mg Oral [facility] Neftali Coley MD   albuterol 2 puff Inhalation Q4H PRN Neftali Coley MD   amLODIPine 5 mg Oral Daily Rebecca Tran MD   barium sulfate 450 mL Oral Once in imaging Rebecca Tran MD   calcium carbonate-vitamin D 1 tablet Oral BID With Meals Neftali Coley MD   clopidogrel 75 mg Oral Daily Neftali Coley MD   docusate sodium 100 mg Oral BID Neftali Coley MD   enoxaparin 40 mg Subcutaneous Q24H [facility] Neftali Coley MD   fish oil 1,000 mg Oral Daily Neftali Coley MD   furosemide 20 mg Oral Daily Gina Wilson MD   gabapentin 500 mg Oral BID Neftali Coley MD   hydroxychloroquine 200 mg Oral Daily With Breakfast Neftali Coley MD   levothyroxine 112 mcg Oral Early Morning Neftali Coley MD   lidocaine 1 patch Topical Daily Neftali Coley MD   lidocaine 1 patch Topical Daily Neftali Coley MD   losartan 50 mg Oral HS Rebecca Tran MD   methocarbamol 500 mg Oral Q6H [facility] Neftali Coley MD   metoprolol tartrate 25 mg Oral Q12H [facility] Neftali Coley MD   nystatin  Topical BID Neftali Coley MD   oxyCODONE 2 5 mg Oral Q6H PRN Neftali Coley MD   polyethylene glycol 17 g Oral Daily PRN Neftali Coley MD   senna 1 tablet Oral HS Neftali Coley MD   sertraline 25 mg Oral Daily Leamon Phlegm Deandra Mix MD   sodium chloride 1 g Oral BID With Meals Lisa Park MD   trolamine salicylate 1 application Topical 4x Daily PRN Ewelina Perales PA-C        Objective:    Functional Update:   1/25/19 OT: Set-up/Clean-up Eating, oral hygiene, modA sponge bathe, UB dressing, totalA LB dressing, toileting hygiene, modA toilet transfer  1/25/19 PT: CGA-Deondre with transfers, ClS 54' with RW ambulation  Allergies per EMR    Physical Exam:  Temp:  [97 8 °F (36 6 °C)-98 4 °F (36 9 °C)] 97 8 °F (36 6 °C)  HR:  [56-88] 60  Resp:  [18] 18  BP: (135-164)/(58-67) 142/58  SpO2:  [93 %-98 %] 93 %    General: alert, no apparent distress, cooperative and comfortable  HEENT:  Head: Normocephalic, no lesions, without obvious abnormality  C-collar in place - needed adjustment  Slightly too big  CARDIAC:  regular rate and rhythm, S1, S2 normal, no murmur, click, rub or gallop  LUNGS:  normal air entry, lungs clear to auscultation  ABDOMEN:  soft, non-tender  Bowel sounds normal  No masses, no organomegaly  EXTREMITIES:  BL LE edema, stable  NEURO:   normal without focal findings, mental status, speech normal, alert and oriented x3 and Demonstrates functional strength throughout  PSYCH:  Normal  and Alert and oriented, appropriate affect  INCISION:  None     Diagnostic Studies: Reviewed  XR spine cervical 2 or 3 vw injury   Final Result by Brayan Momin MD (01/28 1142)      Increased anterior displacement of odontoid fracture  Workstation performed: [id number]         CT chest abdomen pelvis wo contrast   Final Result by Tiffanie Mcclure MD (01/25 1954)      No suspicious appearing masses are seen  There is an approximately 1 cm groundglass attenuation density in the right lower lobe  Suggest reassessment with repeat chest CT in 3-6 months  Relatively mild colonic diverticulosis                 Workstation performed: [id number]         CT head wo contrast   Final Result by Tiffanie Mcclure MD (01/25 1943)      No acute intracranial abnormality  Moderate chronic microvascular ischemic change in periventricular white matter and cerebral atrophy  Workstation performed: [id number]         XR chest pa & lateral   Final Result by Anastacia Early DO (01/25 2331)   No acute cardiopulmonary disease  Workstation performed: [id number]             Laboratory: Reviewed    Results from last 7 days  Lab Units 01/28/19  0541 01/24/19  0632   HEMOGLOBIN g/dL 10 8* 10 6*   HEMATOCRIT % 34 2* 34 0*   WBC Thousand/uL 5 85 10 28*       Results from last 7 days  Lab Units 01/28/19  0541 01/27/19  0510 01/26/19  0605   BUN mg/dL 47* 49* 51*   SODIUM mmol/L 138 136 129*   POTASSIUM mmol/L 4 6 4 8 4 6   CHLORIDE mmol/L 102 100 96*   CREATININE mg/dL 1 11 1 32* 1 36*            Patient Active Problem List   Diagnosis    Closed nondisplaced fracture of second cervical vertebra (HCC)    Neck pain, acute    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)    C6 cervical fracture (HCC)    Hypertension    Hypothyroidism    Fall    Forgetfulness    Ambulatory dysfunction    Physical deconditioning    Acute pain    Delirium    Renovascular hypertension    Chronic diastolic heart failure (HCC)    History of CVA (cerebrovascular accident)    Type 2 diabetes mellitus with diabetic polyneuropathy (HCC)    Depression    Acute renal failure superimposed on chronic kidney disease (HCC)    Rheumatoid arthritis involving multiple sites ([facility] [address] )    Fibromyalgia    History of aortic valve replacement with bioprosthetic valve    Hyponatremia    Renal artery stenosis (HCC)    Parenchymal renal hypertension       ** Please Note: Fluency Direct voice to text software may have been used in the creation of this document  **    Total visit time:  At least 25 minutes, with more than 50% spent counseling/coordinating care

## 2019-01-28 NOTE — PROGRESS NOTES
01/28/19 1230   Pain Assessment   Pain Assessment 0-10   Pain Score 6   Pain Type Acute pain   Pain Location Neck   Pain Orientation Bilateral   Pain Radiating Towards shoulders   Pain Descriptors Aching;Headache;Pressure   Pain Frequency Constant/continuous   Pain Onset Ongoing   Clinical Progression Not changed   Effect of Pain on Daily Activities pain limits the patients ability to tolerate continuous functional mobility   Patient's Stated Pain Goal No pain   Hospital Pain Intervention(s) Repositioned; Other (Comment)  (gentle massage to distal upper traps)   Response to Interventions after gentle massage pt noted decrease in pain to 3/10   Restrictions/Precautions   Precautions Bed/chair alarms; Fall Risk;Pain;Spinal precautions   Weight Bearing Restrictions No   Braces or Orthoses C/S Collar   Cognition   Orientation Level Oriented X4   Subjective   Subjective Patient said that although therapy gives her pain she feels like it is heling her improve her mobility   She said that she will be happy when she learns about her DC date and feels like she's progressing    QI: Sit to [address] Provided by [place] No physical assistance   Sit to Stand CARE Score 4   QI: Chair/Bed-to-Chair Transfer   Assistance Needed Incidental touching   Assistance Provided by [place] No physical assistance   Chair/Bed-to-Chair Transfer CARE Score 4   Transfer Bed/Chair/Wheelchair   Stand Pivot Contact Guard   Sit to Stand Supervision   Stand to Sit Supervision   Bed, Chair, Wheelchair Transfer (FIM) 4 - Patient requires steadying assist or light touching   QI: Walk 10 [address] Provided by [place] No physical assistance   Walk 10 Feet CARE Score 4   QI: Walk 50 Feet with Two [address] Provided by [place] No physical assistance   Walk 50 Feet with Two Turns CARE Score 4   QI: Walk [address] Assistance Provided by [place] No physical assistance   Walk 150 Feet CARE Score 4   QI: Walking 10 Feet on Uneven Surfaces   Reason if not Attempted Safety concerns   Walking 10 Feet on Uneven Surfaces CARE Score 88   Ambulation   Does the patient walk? 2  Yes   Primary Discharge Mode of Locomotion Walk   Walk Assist Level Supervision   Gait Pattern Slow Cande;Decreased foot clearance   Assist Device Roller Walker   Distance Walked (feet) 150 ft  (x 3 and 200)   Limitations Noted In Endurance;Speed   Findings Patient continue to present with slow cande however I do not believe that this is a barrier to her returning home as she does demonstrate good ability to manage safely household distances   Walking (FIM) 5 - Patient requires supervision/monitoring AND distance 150 feet or more, no rest   QI: 1 Step (Curb)   Assistance Needed Incidental touching   Assistance Provided by [place] Less than 25%   1 Step (Curb) CARE Score 3   QI: 4 Steps   Assistance Needed Incidental touching   Assistance Provided by [place] Less than 25%   4 Steps CARE Score 3   Stairs   Type Stairs   # of Steps 9  (3 x 3)   Weight Bearing Precautions Fall Risk;Cervical   Assist Devices Bilateral Rail   Stairs (FIM) 2 - Patient goes up and down 4 - 11 stairs regardless of assist/device/setup   Therapeutic Interventions   Strengthening bilateral LAQs with moderate manual resistance 10 x 3, STS 5 x 2, standing heel raises 10 x 3   Assessment   Treatment Assessment Patient is demonstrating good progress toward her goals as noted above  She is able to manage her mobility with a RW and anticipate that she will be able to mobilize safely household distances  Patient is recommended to continue to practice stair negotiation to assure safe transition in her home  Recommend that she continue to focus on stair negotiation and progression with functional mobility to allow her to return home safely   Patient states that she has not slept in her bed for many years at home and will not need to practice bed transfers because she will continue to use her recline   PT Barriers   Physical Impairment Decreased strength;Decreased range of motion;Decreased endurance;Decreased mobility;Orthopedic restrictions;Pain   Functional Limitation Car transfers;Stair negotiation;Standing;Transfers; Walking   Plan   Treatment/Interventions Functional transfer training;LE strengthening/ROM; Elevations; Therapeutic exercise; Endurance training;Patient/family training;Gait training   Progress Progressing toward goals   PT Therapy Minutes   PT Time In 1230   PT Time Out 1400   PT Total Time (minutes) 90   PT Mode of treatment - Individual (minutes) 90   PT Mode of treatment - Concurrent (minutes) 0   PT Mode of treatment - Group (minutes) 0   PT Mode of treatment - Co-treat (minutes) 0   PT Mode of Teatment - Total time(minutes) 90 minutes   Therapy Time missed   Time missed?  No

## 2019-01-28 NOTE — PROGRESS NOTES
Progress Note - Neurosurgery   Graeme Chapman 80 y o  female MRN: [id number]  Unit/Bed#: -01 Encounter: [id number]    Assessment:  1  Traumatic type 3 odontoid fracture, with extension to left superior articular facet and transverse foramen of C2  2  Nondisplaced fracture of C6 bridging osteophyte  3  History of Bovine tissue AVR, on Plavix  4  History of diabetes mellitus  5  History of hypertension  6  History of frequent falls with 2 episodes of strokes    Plan:  Neuro:  · Exam GCS 15, AAO X 3, , sensation intact to LT, decreased BUE to pinprick, normal to pinprick BLE, reflexes 2+ and symmetric, no clonus, no drift  · Continue regular neurologic checks   · Imaging reviewed personally and by attending  Final results as below  § Xr cervical Spine ordered 1/28/19: showed Increased anterior displacement of odontoid fracture  Pt was bent on this exam thus it was uncertain if this may have been exaggerating the displacement as stated here  Therefore a repeat Xr Cervical spine was ordered  Will review when completed  § CT of cervical spine on 01/09/2019 demonstrates traumatic type 3 odontoid fracture with extension to the left superior articular facet and transverse facet of C2  § Upright cervical spine x-rays in brace on 01/11/2019 demonstrates stable odontoid fracture and C6 fracture  · Ongoing medical mgt per primary team in [facility]  · Pain control per primary team    · Eval and mobilize per PT/OT  · DVT PPX: SCDs, Lovenox  · Pt remains neurologically stable  No neurosurgical intervention is anticipated at this time  Will review repeat Xray of Cervical spine when completed  Neurosurgery will continue to follow  Please call with any questions or concerns  Subjective/Objective   Chief Complaint: "I  pain in around my jaws, the back of my neck and the back of my head"     Subjective: Pt reports she has constant pain in her posterior neck, the back of her head and around her jaws and ears bilaterally   She rates her pain as 2-3/10 on the pain scale and reports her pain is made worse with movement and improves with rests  She reports the pain in her neck does not radiate down her bilateral arms  She reports she also has a hx of chronic ear pain  Pt denies numbness and tingling in bilateral arms or legs  She denies weakness in BUE/BLE  She reports she continues to ambulate with a walker with PT/OT  She reports she has also been able to go up the stairs  Pt reports that she lives at home with her daughter and son in law who live in the adjacent house and would have support whenever she will be ready to be discharged to home  She also gets aides to assist her with cleaning around her house  She reports her  passed away so she lives in the apt next to her daughter by herself  Objective: AAO x 3 , NAD  I/O       01/26 0701 - 01/27 0700 01/27 0701 - 01/28 0700 01/28 0701 - 01/29 0700    P  O  540 957 180    Total Intake(mL/kg) 540 (6 4) 957 (11 4) 180 (2 1)    Urine (mL/kg/hr) 650 (0 3)      Total Output 650        Net -110 +957 +180           Unmeasured Urine Occurrence  2 x     Unmeasured Stool Occurrence  1 x           Invasive Devices          No matching active lines, drains, or airways          Physical Exam:  Vitals: Blood pressure 164/67, pulse 60, temperature 98 4 °F (36 9 °C), temperature source Oral, resp  rate 18, height 4' 11" (1 499 m), weight 84 1 kg (185 lb 6 5 oz), SpO2 95 %  ,Body mass index is 37 45 kg/m²  General appearance: alert, appears stated age, cooperative and no distress  Head: Normocephalic, without obvious abnormality, atraumatic  Eyes: EOMI, PERRL  Neck: supple, symmetrical, trachea midline, cervical brace in place  Back:  tenderness to percussion or palpation of cervical spine    Lungs: non labored breathing  Heart: regular heart rate  Neurologic:   Mental status: Alert, oriented X 3, thought content appropriate  Cranial nerves: grossly intact (Cranial nerves II-XII)  Sensory: normal to LT X 4,   Motor: moving all extremities without focal weakness, strength 5/5 BUE, BLE  4/5 secondary to tight jeans and bilateral knee surgeries  Reflexes: 2+ and symmetric  Coordination: finger to nose normal bilaterally, no drift bilaterally      Lab Results:    Results from last 7 days  Lab Units 01/28/19  0541 01/24/19  0632 01/21/19  1004   WBC Thousand/uL 5 85 10 28* 6 69   HEMOGLOBIN g/dL 10 8* 10 6* 12 1   HEMATOCRIT % 34 2* 34 0* 36 7   PLATELETS Thousands/uL 193 206 205   NEUTROS PCT % 48 70 66   MONOS PCT % 7 6 8       Results from last 7 days  Lab Units 01/28/19  0541 01/27/19  0510 01/26/19  0605   POTASSIUM mmol/L 4 6 4 8 4 6   CHLORIDE mmol/L 102 100 96*   CO2 mmol/L 28 31 25   BUN mg/dL 47* 49* 51*   CREATININE mg/dL 1 11 1 32* 1 36*   CALCIUM mg/dL 8 8 9 0 8 9       Results from last 7 days  Lab Units 01/25/19  0504   MAGNESIUM mg/dL 2 6             Imaging Studies: I have personally reviewed pertinent reports  and I have personally reviewed pertinent films in PACS  Attending personally reviewed pertinent films and reports in PACS  Xr cervical Spine ordered 1/28/19: showed Increased anterior displacement of odontoid fracture  Pt was bent on this xray so a repeat Upright x ray ordered  EKG, Pathology, and Other Studies: I have personally reviewed pertinent reports  VTE Pharmacologic Prophylaxis: Enoxaparin (Lovenox)    VTE Mechanical Prophylaxis: sequential compression device    PLEASE NOTE:  This encounter was completed utilizing the [employer] Direct Speech Voice Recognition Software  Grammatical errors, random word insertions, pronoun errors and incomplete sentences are occasional consequences of the system due to software limitations, ambient noise and hardware issues  These may be missed by proof reading prior to affixing electronic signature   Any questions or concerns about the content, text or information contained within the body of this dictation should be directly addressed to the advanced practitioner or physician for clarification  Please do not hesitate to call me directly if you have any questions or concerns

## 2019-01-28 NOTE — PROGRESS NOTES
Internal Medicine Progress Note  Patient: Tammi Padilla  Age/sex: 80 y o  female  Medical Record #: [id number]      ASSESSMENT/PLAN:  Tammi Padilla is seen and examined and management for following issues:    Type III odontoid fracture with fracture line extending to the right/left superior articular facets and left transverse foramen of C2; nondisplaced fracture through bulky, bridging osteophytes at the C6 level:  required no surgery  Continue collar  Pt reported severe Rt facial pain on 1/19 and an afternoon dose of gabapentin was added  Facial pain she describes is intermitt and in front of ears      HTN: per renal = acceptable on Norvasc 5 mg qd, Cozaar 50mg qd, Lopressor 25mg q12hrs    Hyponatremia:  Resolved; renal is following  On NACL tabs 1 Gm BID  Torsemide still on hold; continue  FR 1200  Renal ordered CXR 1/24 (negative) and CT C/A/P 1/25 negative for etiology for hyponatremia  Ground-glass attenuation RLL lower lung base: For repeat CT chest 3-6 months        CKD; baseline 1 1-1 3:  baseline today ; diuretic is on hold     Hx bioAVR 2012: had no significant disease when had card cath before surgery     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 2016:  continue Plavix that she has been on since CVA 2005; with her TIA 2016, neuro kept Plavix; she is intol statins     DM diet controlled:   watching fasting blood sugars  Today was 80     Chronic diastolic CHF, LVEF 78%, mild-mod MR/mild TR and norm function AVR 2016:  on Lasix 20mg qd as at home which is now changed to Torsemide by renal (on hold now)  Follows with Dr Randy Mcdowell  Family says LE edema is less than at home    CXR 1/24 was negative for CHF     RA/SLE:  continue Plaquenil     HLD: intol to statin; continue Red yeast rice and Fish oil when she goes home     Hypothyroidism:  continue current Levothyroxine     Hx right RA stent: BPs since stent placement is controlled     Depression: Zoloft     Chronic pain/fibromyalgia:  on Hydrocodone at home     Hx breast cancer: follows with Keya Watters      Subjective: Other than the intermitt discomfort from collar, denies any complaints      ROS:   GI: denies abdominal pain, change bowel habits or reflux symptoms  Neuro: +Right facial pain  Respiratory: No Cough, SOB  Cardiovascular: No CP, palpitations     Scheduled Meds:    Current Facility-Administered Medications:  acetaminophen 975 mg Oral [facility] Amarjit Gallo MD   albuterol 2 puff Inhalation Q4H PRN Amarjit Gallo MD   amLODIPine 5 mg Oral Daily Rama Menjivar MD   barium sulfate 450 mL Oral Once in imaging Rama Menjivar MD   calcium carbonate-vitamin D 1 tablet Oral BID With Meals Amarjit Gallo MD   clopidogrel 75 mg Oral Daily Amarjit Gallo MD   docusate sodium 100 mg Oral BID Amarjit Gallo MD   enoxaparin 40 mg Subcutaneous Q24H [facility] Amarjit Gallo MD   fish oil 1,000 mg Oral Daily Amarjit Gallo MD   gabapentin 300 mg Oral BID Ewelina Perales PA-C   gabapentin 600 mg Oral HS Amarjit Gallo MD   hydroxychloroquine 200 mg Oral Daily With Breakfast Amarjit Gallo MD   levothyroxine 112 mcg Oral Early Morning Amarjit Gallo MD   lidocaine 1 patch Topical Daily Amarjit Gallo MD   lidocaine 1 patch Topical Daily Amarjit Gallo MD   losartan 50 mg Oral HS Rama Menjivar MD   methocarbamol 500 mg Oral Q6H [facility] Amarjit Gallo MD   metoprolol tartrate 25 mg Oral Q12H [facility] Amarjit Gallo MD   nystatin  Topical BID Amarjit Gallo MD   oxyCODONE 2 5 mg Oral Q6H PRN Amarjit Gallo MD   polyethylene glycol 17 g Oral Daily PRN Amarjit Gallo MD   senna 1 tablet Oral HS Amarjit Gallo MD   sertraline 25 mg Oral Daily Amarjit Gallo MD   sodium chloride 1 g Oral BID With Meals Ariane Castillo MD   trolamine salicylate 1 application Topical 4x Daily PRN Ewelina Perales PA-C       Labs:       Results from last 7 days  Lab Units 01/28/19  0541 01/24/19  0632   WBC Thousand/uL 5 85 10 28*   HEMOGLOBIN g/dL 10 8* 10 6*   HEMATOCRIT % 34 2* 34 0*   PLATELETS Thousands/uL 193 206 Results from last 7 days  Lab Units 01/28/19  0541 01/27/19  0510   SODIUM mmol/L 138 136   POTASSIUM mmol/L 4 6 4 8   CHLORIDE mmol/L 102 100   CO2 mmol/L 28 31   BUN mg/dL 47* 49*   CREATININE mg/dL 1 11 1 32*   CALCIUM mg/dL 8 8 9 0                    [unfilled]    Labs reviewed    Physical Examination:  Vitals:   Vitals:    01/27/19 1318 01/27/19 2045 01/28/19 0501 01/28/19 0852   BP: 148/58 148/66 135/65 164/67   BP Location: Left arm Left arm Left arm    Pulse: 62 88 56 60   Resp: 18 18 18    Temp: 98 4 °F (36 9 °C) 98 4 °F (36 9 °C) 98 4 °F (36 9 °C)    TempSrc: Oral Oral Oral    SpO2: 97% 98% 95%    Weight:       Height:           HEENT:  No thrush  RESP: CTAB, no R/R/W; resp unlabored  CV: +S1 S2, regular rate, no rubs/murmurs  ABD: soft, NT, ND, normal BS   EXT: edema of LEs L>R is atble  Neuro: AAO; [place] 4/5      [ X ] Total time spent: 30 Mins and greater than 50% of this time was spent counseling/coordinating care  ** Please Note: Dragon 360 Dictation voice to text software may have been used in the creation of this document   **

## 2019-01-28 NOTE — PROGRESS NOTES
01/28/19 0700   Pain Assessment   Pain Assessment 0-10   Pain Score 4   Pain Type Acute pain   Pain Location Neck   Restrictions/Precautions   Precautions Bed/chair alarms;Spinal precautions   Braces or Orthoses C/S Collar   Lifestyle   Intrinsic Gratification Pt reporting that she enjoys being outdoors and spding time with her grand children and dogs  QI: Eating   Assistance Needed Set-up / clean-up   Eating CARE Score 5   Eating Assessment   Findings assessed self feeding task  Pt has demonstrated improved hand to mouth ROM and is able to complete entire meal with use of standard utensil  requires increased time with management of chewing with collar  spilliage on chin peice noted and educated for need of changing pads  Eating (FIM) 5 - Patient needs help to apply orthoses or insert dentures   QI: Oral Hygiene   Assistance Needed Supervision;Set-up / clean-up   Comment in stance at sink   Oral Hygiene CARE Score 4   Grooming   Able To Wash/Dry Hands;Brush/Clean Teeth;Comb/Brush Hair;Initiate Tasks; Acquire Items   Findings in stance at sink   Grooming (FIM) 5 - Patient requires supervision/monitoring   QI: Shower/Bathe Self   Assistance Needed Physical assistance   Assistance Provided by Mobile 25%-49%   Shower/Bathe Self CARE Score 3   [address] [place] [facility] to Wash/Rinse/Dry (body part) Left Arm;Right Arm;L Upper Leg;R Upper Leg;Chest;Abdomen;Perineal Area   Positioning Seated;Standing   Findings  Pt reports use of "flossing "method fopr perinal bathing  Discussed use fo longer towel to complete with reduced reaching and strain on neck  UB bathing completed in stance at sink   Pt reports that she has LH sponge at home    Bathing (FIM) 3 - Patient completes 5/10  6/10 or 7/10 parts   QI: Upper Body Dressing   Assistance Needed Physical assistance   Assistance Provided by Mobile Less than 25%   Comment able to don pull over tank, and requires assist for bring sleeve of button down around to thread  Upper Body Dressing CARE Score 3   QI: Lower Body Dressing   Assistance Needed Incidental touching   Comment Pt utilizing [address] reacher for donning underwear and pants in sitting  increased time required for manipulation of tool but able to complete  Lower Body Dressing CARE Score 4   Dressing/Undressing Clothing   Remove UB Clothes Pullover Shirt   Remove LB Clothes Undergarment   Don UB Clothes Pullover Shirt; [address]; Undergarment   Limitations Noted In Balance; Endurance   Adaptive Equipment Reacher   Findings UB dressing completed in sitting along with LB threading  See QI comment for detail   UB Dressing (FIM) 4 - Patient requires steadying assist or light touching   LB Dressing (FIM) 4 - Patient completes 75% of all tasks   QI: Sit to Stand   Assistance Needed Supervision   Sit to Stand CARE Score 4   QI: Chair/Bed-to-Chair Transfer   Assistance Needed Incidental touching   Chair/Bed-to-Chair Transfer CARE Score 4   Transfer Bed/Chair/Wheelchair   Stand Pivot Contact Guard   Bed, Chair, Wheelchair Transfer (FIM) 4 - Patient requires steadying assist or light touching   QI: [address] Needed Incidental touching   [address] Score 4   Toileting   Able to [address] down yes, up yes  Toileting (FIM) 4 - Patient requires steadying assist or light touching   QI: Toilet Transfer   Assistance Needed Incidental touching   Toilet Transfer CARE Score 4   Toilet Transfer   Adaptive Equipment Grab Bar   Toilet Transfer (FIM) 4 - [place] lifts one extremity during transfer   Activity Tolerance   Activity Tolerance Patient tolerated treatment well   Assessment   Treatment Assessment Pt participated in skilled OT session focusing on functional ADL retraining, activity tolerance, activity modification, use of AE, and functional transfers  See above for details on ADL function   Pt continues to demonstrate the need for increased time to manage fatigue  When utilizing [facility] pt demonstrates ability to complete LB dressing tasks with set up  Stand by assist for safety in stance  Pt continues to require skilled acute rehab OT services to increase overall functional independence and safety w/ ADLs and functional transfers, continue to follow plan of care  Prognosis Fair   Problem List Decreased strength;Decreased range of motion;Decreased endurance; Impaired balance;Decreased mobility; Decreased safety awareness   Plan   Treatment/Interventions ADL retraining;Functional transfer training;LE strengthening/ROM; Therapeutic exercise; Endurance training;Cognitive reorientation;Patient/family training;Bed mobility; Equipment eval/education   Progress Progressing toward goals   OT Therapy Minutes   OT Time In 0700   OT Time Out 0830   OT Total Time (minutes) 90   OT Mode of treatment - Individual (minutes) 90   OT Mode of treatment - Concurrent (minutes) 0   OT Mode of treatment - Group (minutes) 0   OT Mode of treatment - Co-treat (minutes) 0   OT Mode of Teatment - Total time(minutes) 90 minutes

## 2019-01-28 NOTE — PROGRESS NOTES
NEPHROLOGY PROGRESS NOTE   Dorothy Rajan 80 y o  female MRN: [id number]  Unit/Bed#: -01 Encounter: [id number]  Reason for Consult: HERLINDA, Hyponatremia    ASSESSMENT and PLAN:    1 ) Hyponatremia --> resolved  -serum sodium normal at 138  -sodium has improved and nice and correct rate with no evidence of over-correction  -currently on sodium chloride tablets 1 g b i d   -currently on a fluid restriction  -can restart furosemide 20 mg daily  -check a BMP tomorrow  -history of hypovolemia/SIADH    2 ) Acute kidney injury --> resolved  -renal function back to baseline  -restart Lasix    3 ) Chronic kidney disease stage II/III  -baseline creatinine is around 1 mg/dL    4 ) Hypertension  -last blood pressure was running on the higher side but overall has been acceptable  -restart furosemide monitor blood pressure      SUBJECTIVE / INTERVAL HISTORY:    No overnight events    OBJECTIVE:  Current Weight: Weight - Scale: 84 1 kg (185 lb 6 5 oz)  Vitals:    01/27/19 1318 01/27/19 2045 01/28/19 0501 01/28/19 0852   BP: 148/58 148/66 135/65 164/67   BP Location: Left arm Left arm Left arm    Pulse: 62 88 56 60   Resp: 18 18 18    Temp: 98 4 °F (36 9 °C) 98 4 °F (36 9 °C) 98 4 °F (36 9 °C)    TempSrc: Oral Oral Oral    SpO2: 97% 98% 95%    Weight:       Height:           Intake/Output Summary (Last 24 hours) at 01/28/19 1200  Last data filed at 01/28/19 0830   Gross per 24 hour   Intake              837 ml   Output                0 ml   Net              837 ml       Review of Systems:    12 point ROS has been reviewed  Physical Exam   Constitutional: She is oriented to person, place, and time  She appears well-developed and well-nourished  No distress  HENT:   Head: Normocephalic and atraumatic  Eyes: Pupils are equal, round, and reactive to light  No scleral icterus  Neck: Normal range of motion  Neck supple  Cardiovascular: Normal rate, regular rhythm and normal heart sounds    Exam reveals no gallop and no friction rub  No murmur heard  Pulmonary/Chest: Effort normal and breath sounds normal  No respiratory distress  She has no wheezes  She has no rales  She exhibits no tenderness  Abdominal: Soft  Bowel sounds are normal  She exhibits no distension  There is no tenderness  There is no rebound  Musculoskeletal: Normal range of motion  She exhibits no edema  Neurological: She is alert and oriented to person, place, and time  Skin: No rash noted  She is not diaphoretic  Psychiatric: She has a normal mood and affect  Nursing note and vitals reviewed        Medications:    Current Facility-Administered Medications:     acetaminophen (TYLENOL) tablet 975 mg, 975 mg, Oral, Q8H [address], Renato Denise MD, 975 mg at 01/28/19 0631    albuterol (PROVENTIL HFA,VENTOLIN HFA) inhaler 2 puff, 2 puff, Inhalation, Q4H PRN, Renato Denise MD    amLODIPine (NORVASC) tablet 5 mg, 5 mg, Oral, Daily, Danial Harris MD, 5 mg at 01/28/19 9792    barium sulfate 2 1 % suspension 450 mL, 450 mL, Oral, Once in imaging, Danial Harris MD    calcium carbonate-vitamin D (OSCAL-D) 500 mg-200 units per tablet 1 tablet, 1 tablet, Oral, BID With Meals, Renato Denise MD, 1 tablet at 01/28/19 0850    clopidogrel (PLAVIX) tablet 75 mg, 75 mg, Oral, Daily, Renato Denise MD, 75 mg at 01/28/19 0850    docusate sodium (COLACE) capsule 100 mg, 100 mg, Oral, BID, Renato Denise MD, 100 mg at 01/28/19 0850    enoxaparin (LOVENOX) subcutaneous injection 40 mg, 40 mg, Subcutaneous, Q24H [address], Renato Denise MD, 40 mg at 01/28/19 6699    fish oil capsule 1,000 mg, 1,000 mg, Oral, Daily, Renato Denise MD, 1,000 mg at 01/28/19 0854    gabapentin (NEURONTIN) capsule 300 mg, 300 mg, Oral, BID, Ewelina Perales PA-C, 300 mg at 01/28/19 0850    gabapentin (NEURONTIN) capsule 600 mg, 600 mg, Oral, HS, Renato Denise MD, 600 mg at 01/27/19 2140    hydroxychloroquine (PLAQUENIL) tablet 200 mg, 200 mg, Oral, Daily With Breakfast, Rosalio Rodriguez Hafsa Fatima MD, 200 mg at 01/28/19 0854    levothyroxine tablet 112 mcg, 112 mcg, Oral, Early Morning, Kellen Downing MD, 112 mcg at 01/28/19 0631    lidocaine (LIDODERM) 5 % patch 1 patch, 1 patch, Topical, Daily, Kellen Downing MD, 1 patch at 01/28/19 0851    lidocaine (LIDODERM) 5 % patch 1 patch, 1 patch, Topical, Daily, Kellen Downing MD, 1 patch at 01/28/19 0851    losartan (COZAAR) tablet 50 mg, 50 mg, Oral, HS, Suhail Baxter MD, 50 mg at 01/27/19 2141    methocarbamol (ROBAXIN) tablet 500 mg, 500 mg, Oral, Q6H [facility], Kellen Downing MD, 500 mg at 01/28/19 0631    metoprolol tartrate (LOPRESSOR) tablet 25 mg, 25 mg, Oral, Q12H [facility], Kellen Downing MD, 25 mg at 01/28/19 3010    nystatin (MYCOSTATIN) powder, , Topical, BID, Kellen Downing MD    oxyCODONE (ROXICODONE) IR tablet 2 5 mg, 2 5 mg, Oral, Q6H PRN, Kellen Downing MD, 2 5 mg at 01/28/19 0485    polyethylene glycol (MIRALAX) packet 17 g, 17 g, Oral, Daily PRN, Kellen Downing MD    [facility]) tablet 8 6 mg, 1 tablet, Oral, HS, Kellen Downing MD, 8 6 mg at 01/27/19 2142    sertraline (ZOLOFT) tablet 25 mg, 25 mg, Oral, Daily, Kellen Downing MD, 25 mg at 01/27/19 2141    sodium chloride tablet 1 g, 1 g, Oral, BID With Meals, Olayinka Thornton MD, 1 g at 01/28/19 0854    trolamine salicylate (ASPERCREME) 10 % cream 1 application, 1 application, Topical, 4x Daily PRN, Ewelina Perales PA-C, 1 application at 52/61/49 1533    Laboratory Results:    Results from last 7 days  Lab Units 01/28/19  0541 01/27/19  0510 01/26/19  0605 01/25/19  0504 01/24/19  0632 01/22/19  0527   WBC Thousand/uL 5 85  --   --   --  10 28*  --    HEMOGLOBIN g/dL 10 8*  --   --   --  10 6*  --    HEMATOCRIT % 34 2*  --   --   --  34 0*  --    PLATELETS Thousands/uL 193  --   --   --  206  --    POTASSIUM mmol/L 4 6 4 8 4 6 4 5 4 7 4 5   CHLORIDE mmol/L 102 100 96* 96* 98* 98*   CO2 mmol/L 28 31 25 29 27 26   BUN mg/dL 47* 49* 51* 42* 39* 34*   CREATININE mg/dL 1  11 1 32* 1 36* 1 40* 1 20 1 07   CALCIUM mg/dL 8 8 9 0 8 9 9 0 9 1 8 9   MAGNESIUM mg/dL  --   --   --  2 6  --   --

## 2019-01-29 PROBLEM — R91.1 LESION OF LEFT LUNG: Status: ACTIVE | Noted: 2019-01-29

## 2019-01-29 PROBLEM — R52 PAIN: Status: ACTIVE | Noted: 2019-01-29

## 2019-01-29 LAB
ANION GAP SERPL CALCULATED.3IONS-SCNC: 7 MMOL/L (ref 4–13)
BUN SERPL-MCNC: 41 MG/DL (ref 5–25)
CALCIUM SERPL-MCNC: 9.1 MG/DL (ref 8.3–10.1)
CHLORIDE SERPL-SCNC: 102 MMOL/L (ref 100–108)
CO2 SERPL-SCNC: 28 MMOL/L (ref 21–32)
CREAT SERPL-MCNC: 1.18 MG/DL (ref 0.6–1.3)
GFR SERPL CREATININE-BSD FRML MDRD: 42 ML/MIN/1.73SQ M
GLUCOSE SERPL-MCNC: 81 MG/DL (ref 65–140)
POTASSIUM SERPL-SCNC: 4.8 MMOL/L (ref 3.5–5.3)
SODIUM SERPL-SCNC: 137 MMOL/L (ref 136–145)

## 2019-01-29 PROCEDURE — 80048 BASIC METABOLIC PNL TOTAL CA: CPT | Performed by: INTERNAL MEDICINE

## 2019-01-29 PROCEDURE — 99232 SBSQ HOSP IP/OBS MODERATE 35: CPT

## 2019-01-29 PROCEDURE — 97530 THERAPEUTIC ACTIVITIES: CPT

## 2019-01-29 PROCEDURE — 99233 SBSQ HOSP IP/OBS HIGH 50: CPT | Performed by: NEUROLOGICAL SURGERY

## 2019-01-29 PROCEDURE — 97110 THERAPEUTIC EXERCISES: CPT

## 2019-01-29 PROCEDURE — 97116 GAIT TRAINING THERAPY: CPT

## 2019-01-29 PROCEDURE — 97535 SELF CARE MNGMENT TRAINING: CPT

## 2019-01-29 PROCEDURE — 99232 SBSQ HOSP IP/OBS MODERATE 35: CPT | Performed by: INTERNAL MEDICINE

## 2019-01-29 RX ORDER — METHOCARBAMOL 500 MG/1
500 TABLET, FILM COATED ORAL 3 TIMES DAILY
Status: DISCONTINUED | OUTPATIENT
Start: 2019-01-30 | End: 2019-02-06 | Stop reason: HOSPADM

## 2019-01-29 RX ADMIN — OXYCODONE HYDROCHLORIDE 2.5 MG: 5 TABLET ORAL at 09:08

## 2019-01-29 RX ADMIN — ENOXAPARIN SODIUM 40 MG: 40 INJECTION SUBCUTANEOUS at 09:06

## 2019-01-29 RX ADMIN — CALCIUM CARBONATE 500 MG (1,250 MG)-VITAMIN D3 200 UNIT TABLET 1 TABLET: at 07:28

## 2019-01-29 RX ADMIN — LIDOCAINE 1 PATCH: 50 PATCH CUTANEOUS at 09:05

## 2019-01-29 RX ADMIN — STANDARDIZED SENNA CONCENTRATE 8.6 MG: 8.6 TABLET ORAL at 22:31

## 2019-01-29 RX ADMIN — OXYCODONE HYDROCHLORIDE 2.5 MG: 5 TABLET ORAL at 18:19

## 2019-01-29 RX ADMIN — GABAPENTIN 500 MG: 400 CAPSULE ORAL at 09:05

## 2019-01-29 RX ADMIN — LIDOCAINE 1 PATCH: 50 PATCH CUTANEOUS at 09:04

## 2019-01-29 RX ADMIN — LOSARTAN POTASSIUM 50 MG: 50 TABLET, FILM COATED ORAL at 22:30

## 2019-01-29 RX ADMIN — CALCIUM CARBONATE 500 MG (1,250 MG)-VITAMIN D3 200 UNIT TABLET 1 TABLET: at 16:48

## 2019-01-29 RX ADMIN — NYSTATIN: 100000 POWDER TOPICAL at 09:06

## 2019-01-29 RX ADMIN — METHOCARBAMOL 500 MG: 500 TABLET, FILM COATED ORAL at 11:44

## 2019-01-29 RX ADMIN — SODIUM CHLORIDE TAB 1 GM 1 G: 1 TAB at 07:29

## 2019-01-29 RX ADMIN — TROLAMINE SALICYLATE 1 APPLICATION: 10 CREAM TOPICAL at 09:21

## 2019-01-29 RX ADMIN — SERTRALINE HYDROCHLORIDE 25 MG: 25 TABLET ORAL at 22:30

## 2019-01-29 RX ADMIN — LEVOTHYROXINE SODIUM 112 MCG: 112 TABLET ORAL at 05:41

## 2019-01-29 RX ADMIN — DOCUSATE SODIUM 100 MG: 100 CAPSULE, LIQUID FILLED ORAL at 09:05

## 2019-01-29 RX ADMIN — HYDROXYCHLOROQUINE SULFATE 200 MG: 200 TABLET, FILM COATED ORAL at 09:06

## 2019-01-29 RX ADMIN — DOCUSATE SODIUM 100 MG: 100 CAPSULE, LIQUID FILLED ORAL at 17:35

## 2019-01-29 RX ADMIN — ACETAMINOPHEN 975 MG: 325 TABLET, FILM COATED ORAL at 22:29

## 2019-01-29 RX ADMIN — METHOCARBAMOL 500 MG: 500 TABLET, FILM COATED ORAL at 05:41

## 2019-01-29 RX ADMIN — GABAPENTIN 500 MG: 400 CAPSULE ORAL at 17:34

## 2019-01-29 RX ADMIN — METOPROLOL TARTRATE 25 MG: 25 TABLET, FILM COATED ORAL at 09:05

## 2019-01-29 RX ADMIN — METHOCARBAMOL 500 MG: 500 TABLET, FILM COATED ORAL at 00:17

## 2019-01-29 RX ADMIN — ACETAMINOPHEN 975 MG: 325 TABLET, FILM COATED ORAL at 13:29

## 2019-01-29 RX ADMIN — FUROSEMIDE 20 MG: 20 TABLET ORAL at 09:05

## 2019-01-29 RX ADMIN — METOPROLOL TARTRATE 25 MG: 25 TABLET, FILM COATED ORAL at 22:31

## 2019-01-29 RX ADMIN — Medication 1000 MG: at 09:20

## 2019-01-29 RX ADMIN — NYSTATIN: 100000 POWDER TOPICAL at 17:35

## 2019-01-29 RX ADMIN — METHOCARBAMOL 500 MG: 500 TABLET, FILM COATED ORAL at 17:34

## 2019-01-29 RX ADMIN — CLOPIDOGREL BISULFATE 75 MG: 75 TABLET ORAL at 09:05

## 2019-01-29 RX ADMIN — ACETAMINOPHEN 975 MG: 325 TABLET, FILM COATED ORAL at 05:40

## 2019-01-29 RX ADMIN — AMLODIPINE BESYLATE 5 MG: 5 TABLET ORAL at 09:05

## 2019-01-29 NOTE — PROGRESS NOTES
NEPHROLOGY PROGRESS NOTE   Benjamin Marin 80 y o  female MRN: [id number]  Unit/Bed#: -01 Encounter: [id number]  Reason for Consult: Hyponatremia    ASSESSMENT and PLAN:    1 ) Hyponatremia --> resolved  -serum sodium is stable at 137  -sodium has improved and nice and correct rate with no evidence of over-correction  -currently on sodium chloride tablets 1 g b i d   -currently on a fluid restriction  -restarted furosemide 20 mg yesterday  -history of hypovolemia/SIADH  -will discontinue sodium chloride tablets and monitor sodium level closely, check a BMP tomorrow morning  Given her history of chronic congestive heart failure I would like to avoid salt tablets if possible     2 ) Acute kidney injury --> resolved  -renal function back to baseline  -restart Lasix  -creatinine stable at 1 18 today     3 ) Chronic kidney disease stage II/III  -baseline creatinine is around 1 mg/dL     4 ) Hypertension  -last blood pressure was running on the higher side but overall has been acceptable  -blood pressure still remains elevated  -will discontinue salt tablets and monitor blood pressure  -continue Lasix 20 mg      SUBJECTIVE / INTERVAL HISTORY:    No overnight events    OBJECTIVE:  Current Weight: Weight - Scale: 84 1 kg (185 lb 6 5 oz)  Vitals:    01/28/19 1401 01/28/19 2053 01/28/19 2107 01/29/19 0516   BP: 142/58 (!) 173/74 160/70 165/60   BP Location: Left arm Left arm Left arm Left arm   Pulse: 60 59  56   Resp: 18 18  18   Temp: 97 8 °F (36 6 °C) 97 7 °F (36 5 °C)  98 2 °F (36 8 °C)   TempSrc: Oral Oral  Oral   SpO2: 93% 96%  98%   Weight:       Height:           Intake/Output Summary (Last 24 hours) at 01/29/19 0953  Last data filed at 01/29/19 0916   Gross per 24 hour   Intake              960 ml   Output              600 ml   Net              360 ml       Review of Systems:    12 point ROS has been reviewed  Physical Exam   Constitutional: She is oriented to person, place, and time   She appears well-developed and well-nourished  No distress  HENT:   Head: Normocephalic and atraumatic  Eyes: Pupils are equal, round, and reactive to light  No scleral icterus  Neck: Normal range of motion  Neck supple  Cardiovascular: Normal rate, regular rhythm and normal heart sounds  Exam reveals no gallop and no friction rub  No murmur heard  Pulmonary/Chest: Effort normal and breath sounds normal  No respiratory distress  She has no wheezes  She has no rales  She exhibits no tenderness  Abdominal: Soft  Bowel sounds are normal  She exhibits no distension  There is no tenderness  There is no rebound  Musculoskeletal: Normal range of motion  She exhibits no edema  Neurological: She is alert and oriented to person, place, and time  Skin: No rash noted  She is not diaphoretic  Psychiatric: She has a normal mood and affect  Nursing note and vitals reviewed        Medications:    Current Facility-Administered Medications:     acetaminophen (TYLENOL) tablet 975 mg, 975 mg, Oral, Q8H [facility], Kathi Solorzano MD, 975 mg at 01/29/19 0540    albuterol (PROVENTIL HFA,VENTOLIN HFA) inhaler 2 puff, 2 puff, Inhalation, Q4H PRN, Kathi Solorzano MD    amLODIPine (NORVASC) tablet 5 mg, 5 mg, Oral, Daily, Aaron Butcher MD, 5 mg at 01/29/19 0905    barium sulfate 2 1 % suspension 450 mL, 450 mL, Oral, Once in imaging, Aaron Butcher MD    calcium carbonate-vitamin D (OSCAL-D) 500 mg-200 units per tablet 1 tablet, 1 tablet, Oral, BID With Meals, Kathi Solorzano MD, 1 tablet at 01/29/19 0728    clopidogrel (PLAVIX) tablet 75 mg, 75 mg, Oral, Daily, Kathi Solorzano MD, 75 mg at 01/29/19 0905    docusate sodium (COLACE) capsule 100 mg, 100 mg, Oral, BID, Kathi Solorzano MD, 100 mg at 01/29/19 0905    enoxaparin (LOVENOX) subcutaneous injection 40 mg, 40 mg, Subcutaneous, Q24H [facility], Kathi Solorzano MD, 40 mg at 01/29/19 0859    fish oil capsule 1,000 mg, 1,000 mg, Oral, Daily, Kathi Solorzano MD, 1,000 mg at 01/29/19 0920    furosemide (LASIX) tablet 20 mg, 20 mg, Oral, Daily, Janina Haywood MD, 20 mg at 01/29/19 6510    gabapentin (NEURONTIN) capsule 500 mg, 500 mg, Oral, BID, Patrica Camacho MD, 500 mg at 01/29/19 0268    hydroxychloroquine (PLAQUENIL) tablet 200 mg, 200 mg, Oral, Daily With Breakfast, Patrica Camacho MD, 200 mg at 01/29/19 0906    levothyroxine tablet 112 mcg, 112 mcg, Oral, Early Morning, Patrica Camacho MD, 112 mcg at 01/29/19 0541    lidocaine (LIDODERM) 5 % patch 1 patch, 1 patch, Topical, Daily, Patrica Camacho MD, 1 patch at 01/29/19 0905    lidocaine (LIDODERM) 5 % patch 1 patch, 1 patch, Topical, Daily, Patrica Camacho MD, 1 patch at 01/29/19 0904    losartan (COZAAR) tablet 50 mg, 50 mg, Oral, HS, Patrica Kline MD, 50 mg at 01/28/19 2119    methocarbamol (ROBAXIN) tablet 500 mg, 500 mg, Oral, Q6H [facility], Patrica Camacho MD, 500 mg at 01/29/19 0541    metoprolol tartrate (LOPRESSOR) tablet 25 mg, 25 mg, Oral, Q12H [facility], Patrica Camacho MD, 25 mg at 01/29/19 0270    nystatin (MYCOSTATIN) powder, , Topical, BID, Patrica Camacho MD    oxyCODONE (ROXICODONE) IR tablet 2 5 mg, 2 5 mg, Oral, Q6H PRN, Patrica Camacho MD, 2 5 mg at 01/29/19 0908    polyethylene glycol (MIRALAX) packet 17 g, 17 g, Oral, Daily PRN, Patrica Camacho MD    [facility]) tablet 8 6 mg, 1 tablet, Oral, HS, Patrica Camacho MD, 8 6 mg at 01/28/19 2118    sertraline (ZOLOFT) tablet 25 mg, 25 mg, Oral, Daily, Patrica Camacho MD, 25 mg at 01/28/19 2119    trolamine salicylate (ASPERCREME) 10 % cream 1 application, 1 application, Topical, 4x Daily PRN, Ewelina Peraels PA-C, 1 application at 01/52/38 0875    Laboratory Results:    Results from last 7 days  Lab Units 01/29/19  0524 01/28/19  0541 01/27/19  0510 01/26/19  0605 01/25/19  0504 01/24/19  0632   WBC Thousand/uL  --  5 85  --   --   --  10 28*   HEMOGLOBIN g/dL  --  10 8*  --   --   --  10 6*   HEMATOCRIT %  --  34 2*  --   --   --  34 0*   PLATELETS Thousands/uL --  193  --   --   --  206   POTASSIUM mmol/L 4 8 4 6 4 8 4 6 4 5 4 7   CHLORIDE mmol/L 102 102 100 96* 96* 98*   CO2 mmol/L 28 28 31 25 29 27   BUN mg/dL 41* 47* 49* 51* 42* 39*   CREATININE mg/dL 1 18 1 11 1 32* 1 36* 1 40* 1 20   CALCIUM mg/dL 9 1 8 8 9 0 8 9 9 0 9 1   MAGNESIUM mg/dL  --   --   --   --  2 6  --

## 2019-01-29 NOTE — PROGRESS NOTES
01/29/19 0930   Pain Assessment   Pain Assessment 0-10   Pain Score 2   Pain Type Acute pain   Pain Location Chest;Coccyx   Restrictions/Precautions   Precautions Bed/chair alarms;Cardiac/sternal;Cognitive;Fluid restriction   Subjective   Subjective pt states feeling good ready for    QI: Sit to Stand   Assistance Needed Incidental touching   Sit to Stand CARE Score 4   QI: Chair/Bed-to-Chair Transfer   Assistance Needed Incidental touching   Chair/Bed-to-Chair Transfer CARE Score 4   Transfer Bed/Chair/Wheelchair   Limitations Noted In Balance; Endurance;LE Strength;Pain Management; Sequencing   Adaptive Equipment Roller Walker   Stand Pivot Contact Guard   Sit to [employer]   Stand to nPicker Supervision   Findings needs extra time to perform Car transfers    Bed, Chair, Wheelchair Transfer (FIM) 4 - Patient requires steadying assist or light touching   QI: Car Transfer   Assistance Needed Supervision   Car Transfer CARE Score 4   QI: Walk 150 Feet   Assistance Needed Supervision   Walk 150 Feet CARE Score 4   Ambulation   Does the patient walk? 2   Yes   Primary Discharge Mode of Locomotion Walk   Walk Assist Level Supervision   Gait Pattern Slow Jenny;Decreased foot clearance   Assist Device Roller Walker   Distance Walked (feet) 152 ft  (x3)   Limitations Noted In Endurance;Speed   Findings pt progressing with ambulation as above but still has slow gait speed    Walking (FIM) 5 - Patient requires supervision/monitoring AND distance 150 feet or more, no rest   Wheelchair mobility   Wheelchair (FIM) 0 - Activity does not occur   QI: 4 Steps   Assistance Needed Incidental touching   4 Steps CARE Score 4   Stairs   Type Curb;Stairs   # of Steps 6   Weight Bearing Precautions Fall Risk;Cervical   Assist Devices Bilateral Rail   Findings non reciprocal    Stairs (FIM) 2 - Patient goes up and down 4 - 11 stairs regardless of assist/device/setup   Therapeutic Interventions Strengthening LAQ QS standing marching x20    Flexibility Hamstring/calf   stretching in sitting    Balance standing balance marching inplace  and pt walking backward to inc safety awareness and improve balance    Equipment Use   NuStep level 2 for 10 min    Other Comments   Comments pt cont to maintain fair to good safe during all transfers and ambulation with RW    Assessment   Treatment Assessment pt perform thex ex's functional activities and gait training with RW  pt cont with C' collar , inc gait distnace , pt gait slow during ambulation with RW , pt improving with ascending and descending stair   pt will cont to benefit with PT session to meet goals    Problem List Orthopedic restrictions;Pain;Decreased safety awareness; Impaired balance;Decreased endurance;Decreased strength;Decreased mobility   Barriers to Discharge Inaccessible home environment;Decreased caregiver support   Plan   Treatment/Interventions Functional transfer training;LE strengthening/ROM; Elevations; Therapeutic exercise; Endurance training;Patient/family training;Gait training   Progress Progressing toward goals   PT Therapy Minutes   PT Time In 0930   PT Time Out 1030   PT Total Time (minutes) 60   PT Mode of treatment - Individual (minutes) 60   PT Mode of treatment - Concurrent (minutes) 0   PT Mode of treatment - Group (minutes) 0   PT Mode of treatment - Co-treat (minutes) 0   PT Mode of Teatment - Total time(minutes) 60 minutes   Therapy Time missed   Time missed?  No

## 2019-01-29 NOTE — PROGRESS NOTES
01/29/19 1430   Pain Assessment   Pain Assessment No/denies pain   Restrictions/Precautions   Precautions Bed/chair alarms;Spinal precautions;Supervision on toilet/commode   Braces or Orthoses C/S Collar   Subjective   Subjective pt reay for PT session    Ambulation   Primary Discharge Mode of Locomotion Walk   Walk Assist Level Supervision   Gait Pattern Inconsistant Jenny; Slow Jenny   Assist Device Roller Kerline Fang Walked (feet) 150 ft   Limitations Noted In Endurance;Speed;Strength; Sequencing   Walking (FIM) 5 - Patient requires supervision/monitoring AND distance 150 feet or more, no rest   Stairs   Type Stairs   # of Steps 6   Assist Devices Bilateral Rail   Stairs (FIM) 2 - Patient goes up and down 4 - 11 stairs regardless of assist/device/setup   Equipment Use   NuStep level 2 for 10 min   Assessment   Treatment Assessment pt perform thex ex's Nu Step bike for 10 min level 2 , LE only perfomr [facility]   Pt perform gait training with RW to her tolernace 150 feet with RW ,pt will cont toward gols    Plan   Progress Progressing toward goals   PT Therapy Minutes   PT Time In 1430   PT Time Out 1500   PT Total Time (minutes) 30   PT Mode of treatment - Individual (minutes) 30   PT Mode of treatment - Concurrent (minutes) 0   PT Mode of treatment - Group (minutes) 0   PT Mode of treatment - Co-treat (minutes) 0   PT Mode of Teatment - Total time(minutes) 30 minutes   Therapy Time missed   Time missed?  No

## 2019-01-29 NOTE — PROGRESS NOTES
01/29/19 1030   Pain Assessment   Pain Assessment 0-10   Pain Score 2   Pain Type Acute pain   Pain Location Head   Hospital Pain Intervention(s) Distraction; Emotional support; Environmental changes   Restrictions/Precautions   Precautions Bed/chair alarms;Supervision on toilet/commode;Spinal precautions   Weight Bearing Restrictions No   ROM Restrictions Yes   Braces or Orthoses C/S Collar   QI: Eating   Assistance Needed Set-up / Christos Stable Provided by [place] No physical assistance   Eating CARE Score 5   Eating Assessment   Eating (FIM) 6 - Patient requires assistive device or dentures   QI: Sit to Stand   Assistance Needed Incidental touching   Assistance Provided by [place] Less than 25%   Sit to Stand CARE Score 3   QI: Chair/Bed-to-Chair Transfer   Assistance Needed Incidental touching   Assistance Provided by [place] Less than 25%   Comment RW   Chair/Bed-to-Chair Transfer CARE Score 3   Transfer Bed/Chair/Wheelchair   Sit to Stand Minimal;Assist x 1   Stand to Sit Minimal;Assist x 1   Bed, Chair, Wheelchair Transfer (FIM) 4 - Patient requires steadying assist or light touching   Meal Prep   Meal Prep Level Walker   Meal Prep Level of Assistance Minimum assistance   Meal Preparation Pt engaged in making instant oatmeal on stove top to simulate meal prep at home  Pt needed mod VC's for safety and RW placement  Pt reports that she makes oatmeal on the stove top at home and she makes non-instant oatmeal  Pt also reports that daughter does most of the cooking for meals  Pt able to read directions on package and follow them with verbal cues for sequencing task  Pt noted to perseverate on certain step at a time and requires VC's to terminate that task and move on to the next  Pt able to retrieve oatmeal packet from [facility] cabinet, pot from counter, and water from sink  Pt able to find measuring cup but unable to retrieve it due to spinal precautions   Pt demos G safety awareness for leaning around pot to adjust water temperature in back of stove but francesca P safety awareness for walker positioning  Pt reports that she has temperature controls for her stove above oven door on the front portion of the stove top which will increase independence and safety  Pt requires VC's to initiate rest breaks appropriately, chair placed near stove top in line of sight to promote safety  Pt's daughter present at the end of session and reviewed recommendations for all items to be placed at counter height, supervision recommended during hot meal prep 2* to dec functional cognition (memory, insight, and safety awareness), and to place chair near stove to promote ECTs  Pt's daughter receptive to all recommendations  Cognition   Overall Cognitive Status Impaired   Arousal/Participation Alert; Cooperative   Attention Attends with cues to redirect   Orientation Level Oriented X4   Memory Decreased recall of precautions;Decreased short term memory;Decreased recall of recent events   Following Commands Follows one step commands with increased time or repetition   Additional Activities   Additional Activities Comments Pt engaged in towel glides on the table to focus on increased UE ROM to increase independence with ADL tasks  Pt completes 3 x 10 doing towel glides front and back, side to side, V shaped, clockwise circles and counter clockwise circles  Overall tolerates well this session, decreased c/o pain this session  Activity Tolerance   Activity Tolerance Patient tolerated treatment well   Assessment   Treatment Assessment Pt participated in skilled OT services to focus on meal prep, kitchen mobility, and UE ROM  See above for details on meal prep  Pt demos compliance with orthopedic retrictions   Pt overall making G progress towards goals but continues to be limited by decrease endurance, decreased UE ROM, decreased safety awareness, decreased standing tolerance/balance, decreased functional cognition, and orthopedic restrictions which limits her ability to complete I/ADL tasks independently  Pt with supportive family that was present at end of session, receptive to all recommendations  Will benefit from further family training on February 1st to review C collar management and ADL recommendations  Pt would benefit from skilled OT services to focus on endurance, family training, standing balance/tolerance, and general strengthening  Prognosis Fair   Problem List Decreased strength;Decreased range of motion;Decreased endurance; Impaired balance;Decreased mobility; Decreased cognition;Decreased safety awareness;Orthopedic restrictions   Plan   Treatment/Interventions ADL retraining;Functional transfer training; Therapeutic exercise; Endurance training;Cognitive reorientation;Patient/family training   Progress Progressing toward goals   Recommendation   OT Discharge Recommendation Home with family support   OT Therapy Minutes   OT Time In 1030   OT Time Out 1145   OT Total Time (minutes) 75   OT Mode of treatment - Individual (minutes) 75   OT Mode of treatment - Concurrent (minutes) 0   OT Mode of treatment - Group (minutes) 0   OT Mode of treatment - Co-treat (minutes) 0   OT Mode of Teatment - Total time(minutes) 75 minutes   Therapy Time missed   Time missed?  No

## 2019-01-29 NOTE — PROGRESS NOTES
01/29/19 1305   Pain Assessment   Pain Assessment No/denies pain   Pain Score No Pain   Restrictions/Precautions   Precautions Bed/chair alarms;Spinal precautions;Supervision on toilet/commode   Weight Bearing Restrictions No   ROM Restrictions Yes   Braces or Orthoses C/S Collar   QI: Chair/Bed-to-Chair Transfer   Assistance Needed Incidental touching   Assistance Provided by [place] Less than 25%   Comment RW   Chair/Bed-to-Chair Transfer CARE Score 3   Transfer Bed/Chair/Wheelchair   Limitations Noted In Balance; Endurance;LE Strength   Adaptive Equipment Roller [employer], Chair, Wheelchair Transfer (FIM) 4 - Patient completes 75% of all tasks   QI: [address] Needed Physical assistance   Assistance Provided by [place] 25%-49%   Comment Pt demos improvement with CM up/down over hips but requires A for rear hygiene for thoroughness  Toileting Hygiene CARE Score 3   Toileting   Able to [address] down yes, up yes  Able to Manage Clothing Closures No   Manage Hygiene Bladder; Bowel   Limitations Noted In Balance;LE Strength;UE Strength   Adaptive Equipment Grab Bar   Findings Pt demos significant improvement with CM up/down over hips, however, continues to require A for rear hygiene  Orange cream and nystatin powder applied to labia area 2* to decreased skin integrity  Nursing aware  Toileting (FIM) 3 - Patient completes  50-74% of all tasks   QI: Toilet Transfer   Assistance Needed Incidental touching   Assistance Provided by [place] No physical assistance   Toilet Transfer CARE Score 4   Toilet Transfer   Surface Assessed Raised Toilet   Transfer Technique Standard   Limitations Noted In Balance; Endurance; Safety;UE Strength;LE Strength   Adaptive Equipment Grab Bar   Toilet Transfer (FIM) 4 - Patient requires steadying assist or light touching   Cognition   Overall Cognitive Status Impaired   Arousal/Participation Alert; Cooperative   Attention Attends with cues to redirect Orientation Level Oriented X4   Memory Decreased recall of precautions   Following Commands Follows one step commands with increased time or repetition   Activity Tolerance   Activity Tolerance Patient tolerated treatment well   Assessment   Treatment Assessment Pt participated in skilled OT services with focus on toileting, functional transfers, and endurance  Pt demos significant improvement with CM up/down over hips during toileting tasks  She continues to require VC's for safety and to maintain unilateral UE support on grab bar during task to maintain balance  Pt continues to require A for rear hygiene 2* to decreased UE ROM  Pt's daughter completed pad change on our simulated practice collar with 100% accuracy  Pt will continue to benefit from skilled OT services with focus on FT scheduled for Friday February 1st at 12:30 PM to review shower transfer and  C collar management  Prognosis Fair   Problem List Decreased strength;Decreased range of motion;Decreased endurance; Impaired balance;Decreased mobility; Decreased cognition;Decreased safety awareness;Orthopedic restrictions   Plan   Treatment/Interventions ADL retraining;Functional transfer training; Therapeutic exercise; Endurance training;Equipment eval/education; Compensatory technique education; Bed mobility   Progress Progressing toward goals   Recommendation   OT Discharge Recommendation Home with family support   OT Therapy Minutes   OT Time In 1305   OT Time Out 1330   OT Total Time (minutes) 25   OT Mode of treatment - Individual (minutes) 25   OT Mode of treatment - Concurrent (minutes) 0   OT Mode of treatment - Group (minutes) 0   OT Mode of treatment - Co-treat (minutes) 0   OT Mode of Teatment - Total time(minutes) 25 minutes   Therapy Time missed   Time missed?  No

## 2019-01-29 NOTE — PROGRESS NOTES
Progress Note - Neurosurgery   Bong Morris 80 y o  female MRN: [id number]  Unit/Bed#: -01 Encounter: [id number]    Assessment:  1  Traumatic type 3 odontoid fracture, with extension to left superior articular facet and transverse foramen of C2  2  Nondisplaced fracture of C6 bridging osteophyte  3  History of Bovine tissue AVR, on Plavix  4  History of diabetes mellitus  5  History of hypertension  6  History of frequent falls with 2 episodes of strokes    Plan:  · Exam GCS 15, AAO X 3, , sensation intact to LT, decreased BUE to pinprick, normal to pinprick BLE, reflexes 2+ and symmetric, no clonus, no drift  · Continue regular neurologic checks   · Imaging reviewed personally and by attending  Final results as below  § Repeat X ray cervical spine 1/28/19 : Pt Upright in this repeat X ray compared to previous x ray  Odontoid fracture looks stable  Awaiting final read  § Xr cervical Spine ordered 1/28/19: showed Increased anterior displacement of odontoid fracture  Pt was bent on this exam thus it was uncertain if this may have been exaggerating the displacement as stated here  Therefore a repeat Xr Cervical spine was ordered  C6 fracture  § CT of cervical spine on 01/09/2019 demonstrates traumatic type 3 odontoid fracture with extension to the left superior articular facet and transverse facet of C2  § Upright cervical spine x-rays in brace on 01/11/2019 demonstrates stable odontoid fracture and C6 fracture  · Ongoing medical mgt per primary team in [facility]  · Pain control per primary team    · Eval and mobilize per PT/OT  · DVT PPX: SCDs, Lovenox  · Pt remains neurologically stable  No neurosurgical intervention is anticipated at this time  2 week follow up apt scheduled at the neurosurgery office with repeat Xray of Cervical spine  Neurosurgery will see pt as needed during the remainder of her hospitalization  Please call with any questions or concerns       Subjective/Objective   Chief Complaint: "I have mild pain in my neck while I sit here"/ Follow up for type III Odontoid fracture and C6 fracture  Subjective: Pt reports that while she sits resting in her chair, she only has mild pain that she rates as 1/10 on the pain scale  She reports the pain is limited to her neck at this time and does not radiate down her arms  She reports the pain is aggravated by movement and improves by staying still or sitting down  She denies numbness, tingling or weakness in bilateral arms and legs  Pt reports she continues to void in the bathroom without concerns  She reports she continues to do well with her therapy, walking down the hallway using her walker  Objective: Alert and awake, NAD  I/O       01/27 0701 - 01/28 0700 01/28 0701 - 01/29 0700    P  O  957 1140    Total Intake(mL/kg) 957 (11 4) 1140 (13 6)    Urine (mL/kg/hr)  300 (0 1)    Total Output   300    Net +957 +840          Unmeasured Urine Occurrence 2 x 1 x    Unmeasured Stool Occurrence 1 x           Invasive Devices          No matching active lines, drains, or airways          Physical Exam:  Vitals: Blood pressure 165/60, pulse 56, temperature 98 2 °F (36 8 °C), temperature source Oral, resp  rate 18, height 4' 11" (1 499 m), weight 84 1 kg (185 lb 6 5 oz), SpO2 98 %  ,Body mass index is 37 45 kg/m²      General appearance: alert, appears stated age, cooperative and no distress  Head: Normocephalic, without obvious abnormality, atraumatic  Eyes: EOMI, PERRL  Neck: supple, symmetrical, trachea midline, cervical brace in place  Back: tenderness to percussion or palpation of posterior cervical spine  Lungs: non labored breathing  Heart: regular heart rate  Neurologic:   Mental status: Alert, oriented x 3, thought content appropriate  Cranial nerves: grossly intact (Cranial nerves II-XII)  Sensory: normal to LT X 4  Motor: moving all extremities without focal weakness, strength 5/5 BUE, BLE  4/5 secondary to tight jeans and bilateral knee surgeries  Reflexes: 2+ and symmetric  Coordination: no drift bilaterally      Lab Results:    Results from last 7 days  Lab Units 01/28/19  0541 01/24/19  0632   WBC Thousand/uL 5 85 10 28*   HEMOGLOBIN g/dL 10 8* 10 6*   HEMATOCRIT % 34 2* 34 0*   PLATELETS Thousands/uL 193 206   NEUTROS PCT % 48 70   MONOS PCT % 7 6       Results from last 7 days  Lab Units 01/29/19  0524 01/28/19  0541 01/27/19  0510   POTASSIUM mmol/L 4 8 4 6 4 8   CHLORIDE mmol/L 102 102 100   CO2 mmol/L 28 28 31   BUN mg/dL 41* 47* 49*   CREATININE mg/dL 1 18 1 11 1 32*   CALCIUM mg/dL 9 1 8 8 9 0       Results from last 7 days  Lab Units 01/25/19  0504   MAGNESIUM mg/dL 2 6               Imaging Studies: I have personally reviewed pertinent reports  and I have personally reviewed pertinent films in PACS  Attending reviewed pertinent reports and films in PACS  · Repeat X ray cervical spine 1/28/19 : Pt Upright in this repeat X ray compared to previous x ray  Odontoid fracture looks stable  Awaiting final read  · Xr cervical Spine ordered 1/28/19: showed Increased anterior displacement of odontoid fracture  Pt was bent on this exam thus it was uncertain if this may have been exaggerating the displacement as stated here  Therefore a repeat Xr Cervical spine was ordered  C6 fracture  · CT of cervical spine on 01/09/2019 demonstrates traumatic type 3 odontoid fracture with extension to the left superior articular facet and transverse facet of C2  · Upright cervical spine x-rays in brace on 01/11/2019 demonstrates stable odontoid fracture and C6 fracture      EKG, Pathology, and Other Studies: I have personally reviewed pertinent reports  VTE Pharmacologic Prophylaxis: Enoxaparin (Lovenox)    VTE Mechanical Prophylaxis: sequential compression device    PLEASE NOTE:  This encounter was completed utilizing the [employer] Direct Speech Voice Recognition Software   Grammatical errors, random word insertions, pronoun errors and incomplete sentences are occasional consequences of the system due to software limitations, ambient noise and hardware issues  These may be missed by proof reading prior to affixing electronic signature  Any questions or concerns about the content, text or information contained within the body of this dictation should be directly addressed to the advanced practitioner or physician for clarification  Please do not hesitate to call me directly if you have any questions or concerns

## 2019-01-30 ENCOUNTER — TELEPHONE (OUTPATIENT)
Dept: NEUROSURGERY | Facility: CLINIC | Age: 84
End: 2019-01-30

## 2019-01-30 LAB
ANION GAP SERPL CALCULATED.3IONS-SCNC: 5 MMOL/L (ref 4–13)
BUN SERPL-MCNC: 40 MG/DL (ref 5–25)
CALCIUM SERPL-MCNC: 9.2 MG/DL (ref 8.3–10.1)
CHLORIDE SERPL-SCNC: 101 MMOL/L (ref 100–108)
CO2 SERPL-SCNC: 29 MMOL/L (ref 21–32)
CREAT SERPL-MCNC: 1.18 MG/DL (ref 0.6–1.3)
GFR SERPL CREATININE-BSD FRML MDRD: 42 ML/MIN/1.73SQ M
GLUCOSE SERPL-MCNC: 75 MG/DL (ref 65–140)
POTASSIUM SERPL-SCNC: 4.5 MMOL/L (ref 3.5–5.3)
SODIUM SERPL-SCNC: 135 MMOL/L (ref 136–145)

## 2019-01-30 PROCEDURE — 97116 GAIT TRAINING THERAPY: CPT

## 2019-01-30 PROCEDURE — 97530 THERAPEUTIC ACTIVITIES: CPT

## 2019-01-30 PROCEDURE — 97535 SELF CARE MNGMENT TRAINING: CPT

## 2019-01-30 PROCEDURE — 99232 SBSQ HOSP IP/OBS MODERATE 35: CPT | Performed by: INTERNAL MEDICINE

## 2019-01-30 PROCEDURE — 80048 BASIC METABOLIC PNL TOTAL CA: CPT | Performed by: INTERNAL MEDICINE

## 2019-01-30 PROCEDURE — 97110 THERAPEUTIC EXERCISES: CPT

## 2019-01-30 RX ADMIN — NYSTATIN: 100000 POWDER TOPICAL at 17:14

## 2019-01-30 RX ADMIN — ENOXAPARIN SODIUM 40 MG: 40 INJECTION SUBCUTANEOUS at 08:56

## 2019-01-30 RX ADMIN — CALCIUM CARBONATE 500 MG (1,250 MG)-VITAMIN D3 200 UNIT TABLET 1 TABLET: at 08:55

## 2019-01-30 RX ADMIN — METHOCARBAMOL 500 MG: 500 TABLET, FILM COATED ORAL at 21:25

## 2019-01-30 RX ADMIN — OXYCODONE HYDROCHLORIDE 2.5 MG: 5 TABLET ORAL at 08:59

## 2019-01-30 RX ADMIN — CLOPIDOGREL BISULFATE 75 MG: 75 TABLET ORAL at 08:55

## 2019-01-30 RX ADMIN — OXYCODONE HYDROCHLORIDE 2.5 MG: 5 TABLET ORAL at 15:22

## 2019-01-30 RX ADMIN — METOPROLOL TARTRATE 25 MG: 25 TABLET, FILM COATED ORAL at 21:23

## 2019-01-30 RX ADMIN — Medication 1000 MG: at 08:57

## 2019-01-30 RX ADMIN — ACETAMINOPHEN 975 MG: 325 TABLET, FILM COATED ORAL at 13:30

## 2019-01-30 RX ADMIN — LEVOTHYROXINE SODIUM 112 MCG: 112 TABLET ORAL at 06:33

## 2019-01-30 RX ADMIN — ACETAMINOPHEN 975 MG: 325 TABLET, FILM COATED ORAL at 21:23

## 2019-01-30 RX ADMIN — LIDOCAINE 1 PATCH: 50 PATCH CUTANEOUS at 08:11

## 2019-01-30 RX ADMIN — STANDARDIZED SENNA CONCENTRATE 8.6 MG: 8.6 TABLET ORAL at 21:23

## 2019-01-30 RX ADMIN — METHOCARBAMOL 500 MG: 500 TABLET, FILM COATED ORAL at 06:34

## 2019-01-30 RX ADMIN — LOSARTAN POTASSIUM 50 MG: 50 TABLET, FILM COATED ORAL at 21:23

## 2019-01-30 RX ADMIN — METOPROLOL TARTRATE 25 MG: 25 TABLET, FILM COATED ORAL at 08:55

## 2019-01-30 RX ADMIN — ACETAMINOPHEN 975 MG: 325 TABLET, FILM COATED ORAL at 06:33

## 2019-01-30 RX ADMIN — DOCUSATE SODIUM 100 MG: 100 CAPSULE, LIQUID FILLED ORAL at 17:14

## 2019-01-30 RX ADMIN — CALCIUM CARBONATE 500 MG (1,250 MG)-VITAMIN D3 200 UNIT TABLET 1 TABLET: at 17:14

## 2019-01-30 RX ADMIN — GABAPENTIN 500 MG: 400 CAPSULE ORAL at 08:54

## 2019-01-30 RX ADMIN — AMLODIPINE BESYLATE 5 MG: 5 TABLET ORAL at 08:55

## 2019-01-30 RX ADMIN — DOCUSATE SODIUM 100 MG: 100 CAPSULE, LIQUID FILLED ORAL at 08:56

## 2019-01-30 RX ADMIN — GABAPENTIN 500 MG: 400 CAPSULE ORAL at 17:14

## 2019-01-30 RX ADMIN — SERTRALINE HYDROCHLORIDE 25 MG: 25 TABLET ORAL at 21:23

## 2019-01-30 RX ADMIN — FUROSEMIDE 20 MG: 20 TABLET ORAL at 08:55

## 2019-01-30 RX ADMIN — METHOCARBAMOL 500 MG: 500 TABLET, FILM COATED ORAL at 13:29

## 2019-01-30 RX ADMIN — NYSTATIN: 100000 POWDER TOPICAL at 08:57

## 2019-01-30 RX ADMIN — HYDROXYCHLOROQUINE SULFATE 200 MG: 200 TABLET, FILM COATED ORAL at 08:57

## 2019-01-30 NOTE — PROGRESS NOTES
01/30/19 0930   Pain Assessment   Pain Score 5   Pain Type Acute pain;Chronic pain   Pain Location Head   Pain Orientation Right   Hospital Pain Intervention(s) Ambulation/increased activity  (medicated earlier )   Restrictions/Precautions   Precautions Bed/chair alarms; Fall Risk;Spinal precautions;Supervision on toilet/commode   Braces or Orthoses C/S Collar   Cognition   Arousal/Participation Alert; Cooperative   Attention Within functional limits   Following Commands Follows multistep commands with increased time or repetition   Subjective   Subjective Pt  reported having a bad headache earlier but was medicated  Is ready to participate in therapy    QI: Sit to Stand   Assistance Needed Incidental touching   Sit to Stand CARE Score 4   QI: Chair/Bed-to-Chair Transfer   Assistance Needed Supervision   Chair/Bed-to-Chair Transfer CARE Score 4   Transfer Bed/Chair/Wheelchair   Adaptive Equipment Roller Walker   Stand Pivot Supervision   Sit to Stand Contact Guard;Supervision   Stand to [employer] Transfer Minimal Assist   Findings occasional CGA for sit to stand when pt  not placing feet back enough and is unsteady when she first gets up    Bed, Chair, Wheelchair Transfer (FIM) 4 - Patient requires steadying assist or light touching   QI: Car Transfer   Assistance Needed Physical assistance   Assistance Provided by [place] 25%-49%   Comment used RW as a grab bar like what she has in her daughter's car    Car Transfer CARE Score 3   QI: [address] [place] 10 Feet CARE Score 4   QI: Walk 50 Feet with Two [place] 50 Feet with Two Turns CARE Score 4   QI: Walk 150 Feet   Assistance Needed Supervision   Walk 150 Feet CARE Score 4   QI: Walking 10 Feet on Uneven Surfaces   Reason if not Attempted Safety concerns   Walking 10 Feet on Uneven Surfaces CARE Score 88   Ambulation   Does the patient walk? 2   Yes   Primary Discharge Mode of Locomotion Walk   Walk Assist Level Close Supervision   Gait Pattern Slow Cande; Step to; Improper weight shift   Assist Device Roller Kerline Fang Walked (feet) 150 ft  (X 2 )   Limitations Noted In Balance; Coordination;Device Management; Endurance   Walking (FIM) 5 - Patient requires supervision/monitoring AND distance 150 feet or more, no rest   Wheelchair mobility   QI: Does the patient use a wheelchair? 0  No   QI: 1 Step (Curb)   Assistance Needed Incidental touching   Comment 2" step block to mimic ISRA    1 Step (Curb) CARE Score 4   QI: 4 Steps   Assistance Needed Physical assistance   Assistance Provided by [place] 25%-49%   4 Steps CARE Score 3   QI: 12 Steps   Reason if not Attempted Safety concerns   12 Steps CARE Score 88   Stairs   Type Stairs;Curb   # of Steps 6   Weight Bearing Precautions Cervical;Fall Risk   Assist Devices Bilateral Rail   Findings min to mod A    Stairs (FIM) 3 - Patient completes 50 - 74% of all tasks, needs more than steadying or light touch AND goes up and down full flight (12- 14 stairs)   Therapeutic Interventions   Strengthening seated LAQ with 2# wts, add suqezze with ball, hip abd with red TB, standing marching in place    Flexibility B hamstring and gastroc stretching    Equipment Use   NuStep Level 2 , LE only 12 mins    Assessment   Treatment Assessment Pt  tolerated session well amidst ongoing headache but reported feeling a little better after her pain meds earlier  No LOB noted durins session but needed cueing for proper foot placement with sit to stand with pt  tending to put feet far forward making her unstable when she first initial stands  Pt  ambulates in slow cande but is safe with no LOB noted  Pt  cont to have some difficulty with ascending steps due to arthritis on B LE but does not have really to manage steps in her house  Pt   cont to demonstrate progress and inc activity tolerance   COnt with POC as tolerated    Problem List Decreased strength;Decreased endurance; Impaired balance;Decreased mobility; Decreased coordination; Impaired sensation;Orthopedic restrictions;Pain   PT Barriers   Physical Impairment Decreased strength;Decreased endurance; Impaired balance;Decreased mobility; Decreased coordination; Impaired sensation;Orthopedic restrictions;Pain   Functional Limitation Car transfers;Stair negotiation;Standing;Transfers; Walking   Plan   Treatment/Interventions Functional transfer training;LE strengthening/ROM; Elevations; Therapeutic exercise; Endurance training;Patient/family training;Equipment eval/education; Bed mobility;Gait training   Recommendation   Recommendation Home PT; Home with family support   Equipment Recommended Walker   PT Therapy Minutes   PT Time In 0930   PT Time Out 1100   PT Total Time (minutes) 90   PT Mode of treatment - Individual (minutes) 90   PT Mode of treatment - Concurrent (minutes) 0   PT Mode of treatment - Group (minutes) 0   PT Mode of treatment - Co-treat (minutes) 0   PT Mode of Teatment - Total time(minutes) 90 minutes   Therapy Time missed   Time missed?  No

## 2019-01-30 NOTE — PROGRESS NOTES
Physical Medicine and Rehabilitation Progress Note  Aamir Brown 80 y o  female MRN: [id number]  Unit/Bed#: -01 Encounter: [id number]    HPI: "Anatoly Vargas a 80 y  o  female who presented to the [facility] as a trauma following a fall on Plavix  She utilizes a SPC at baseline, and had hit a wet patch on the floor causing the cane to slip from under her  Her PMH is significant for HTN with history of CVA, AVR, HTN, and T2DM  CT C-Spine revealed a Type 3 Odontoid fracture extending to the R and L superior articular facets and L transverse foramen  She also had an acute non-displaced C6 fracture  Treated non-op  Course c/b by fluid overload, hospital delirium, and  Pain  These are improving  She was evaluated by PT/OT and determined to be appropriate for discharge to the [facility] on 1/17/19  " Per primary attending     Chief Complaint:  Decline in function    Interval: Repeat C-spine x-ray confirms slight increased anterior displacement of odontoid compared to XR 1/11  Awaiting attending NS recs  Patient reports neck pain and associated myofacial trap, upper back pain are improving well  She denies new weakness, sensory changes, radiating arm or leg pain, worsening balance, bowel/bladder problems, lightheadedness, trouble swallowing, calf pain, SOB, or other complaints    ROS: A 10-point ROS was performed  Negative except as listed above  Assessment/Plan:      * Ambulatory dysfunction   Assessment & Plan    Patient will participate in a comprehensive inpatient rehab program with 3-5 hours a day 5-7 days a week of PT/OT  To evaluate for any further SLP needs  - Continue pain management  - Fall precautions     Type III fracture of odontoid process [facility])   Assessment & Plan    Please see ambulatory dysfunction    Follow-up X-rays 1/28: some increased anterior displacement compared with 1/11 films  Neuro exam stable   >NS following > await further recommendations from attending  >C-Collar ATC   - Cervical precautions       Pain   Assessment & Plan    Improving neck and associated myofascial pain improving  - Continue scheduled APAP  - Adjust Robaxin 500mg TID for muscle spasms but limit impact on sleep with QID dosing  - Gabapentin 500mg BID scheduled for adjuvant  Pain control and chronic Fibro  - Continue oxycodone 2 5mg to Q6hr PRN  - Lidocaine patch     Lesion of left lung   Assessment & Plan    CT C/A/P - 1 cm groundglass attenuation density in the right lower lobe  Suggest reassessment with repeat chest CT in 3-6 months  Hyponatremia   Assessment & Plan    Resolved  Fluid restriction per nephro/IM  Imaging per prior providers:  - CTCAP to rule out other causes - unremarkable except for possible pulmonary nodule  - CTH unremarkable except for old, stable lacunar infarct  Nephro recs appreciated  History of aortic valve replacement with bioprosthetic valve   Assessment & Plan    Monitor cardiopulmonary status  IM to manage     Fibromyalgia   Assessment & Plan    Continue sertraline  Continue gabapentin to 500mg BID  Consult neuropsychology for adjustment and coping skills for pain management  Rheumatoid arthritis involving multiple sites [facility])   Assessment & Plan    Continue plaquenil, and pain mgmt as outlined below  - Monitor for flare  - IM following     Acute renal failure superimposed on chronic kidney disease [facility])   Assessment & Plan    Remains resolved 1/29  - Follow-up mgmt per nephro/IM       Depression   Assessment & Plan    Continue sertraline  Type 2 diabetes mellitus with diabetic polyneuropathy [facility])   Assessment & Plan    Lab Results   Component Value Date    HGBA1C 5 2 01/18/2019       No results for input(s): POCGLU in the last 72 hours  Blood Sugar Average: Last 72 hrs:  - Monitor fasting glucose  - Continue diabetic diet  - At home diet controlled  - Placed on diabetic diet    - IM to manage     History of CVA (cerebrovascular accident)   Assessment & Plan    Old L IC stroke appreciated on recent CT head   Cannot tolerate statins  Continue Plavix  Continue fish oil  At home on red yeast rice as well  Chronic diastolic heart failure (HCC)   Assessment & Plan    Continue lasix 20mg qday per nephro/IM  Monitor kidney function on lasix   Continue ARB/BB     Renovascular hypertension   Assessment & Plan    Mgmt per IM/Nephro  Amlodipine 5mg  Goal <130/80 but not by much per Nephrology  Losartan 50mg  Continue metoprolol tartrate 25mg Q12  Continue Lasix 20mg daily  Change hydralazine PRN to PO   S/p stenting for DONAVAN       C6 cervical fracture (HCC)   Assessment & Plan    Non-op management  - Pain control as noted above in Type III odontoid fracture  - PT/OT       # Skin  · Encourage regular turning as patient at risk for skin breakdown  · Staff to continue patient education on Q2h turning     # Bowel    Bowel program; stooling better    # Bladder  · Patient voiding spontaneously    # Rehab Psych   · referral to Rehabilitation Psychology    # Other  - Diet/Nutrition:        Diet Orders            Start     Ordered    01/24/19 1248  Diet Regular; Regular House; Fluid Restriction 1200 ML  Diet effective now     Question Answer Comment   Diet Type Regular    Regular Regular House    Other Restriction(s): Fluid Restriction 1200 ML    RD to adjust diet per protocol? Yes        01/24/19 1247    01/18/19 0721  Room Service  Once     Question:  Type of Service  Answer:  Room Service - Appropriate with Assistance    01/18/19 0720    01/17/19 1654  Dietary nutrition supplements  Once     Question Answer Comment   Select Supplement: Ensure Enlive-Vanilla    Frequency Lunch        01/17/19 1653        - DVT prophy: Sequential compression device (Venodyne)  and Enoxaparin (Lovenox)  - GI ppx: None  - Nausea: None  - Supplements: None  - Sleep: None    Disposition: Team 1/23/19: UE ROM/functional strength are barriers   She may have some cognitive deficits at baseline  OT to check MOCA today  Also having issues with the fit of her Estefani collar for showers  Anticipate that her length of stay may be about 2-3 weeks  Will reteam this week      CODE: Level 1: Full Code   Scheduled Meds:    Current Facility-Administered Medications:  acetaminophen 975 mg Oral [facility] Rogers Velez MD   albuterol 2 puff Inhalation Q4H PRN Rogers Velez MD   amLODIPine 5 mg Oral Daily Linda Wayne MD   barium sulfate 450 mL Oral Once in imaging Linda Wayne MD   calcium carbonate-vitamin D 1 tablet Oral BID With Meals Rogers Velez MD   clopidogrel 75 mg Oral Daily Rogers Velez MD   docusate sodium 100 mg Oral BID Rogers Velez MD   enoxaparin 40 mg Subcutaneous Q24H [facility] Rogers Velez MD   fish oil 1,000 mg Oral Daily Rogers Velez MD   furosemide 20 mg Oral Daily Sarah Huerta MD   gabapentin 500 mg Oral BID Rogers Velez MD   hydroxychloroquine 200 mg Oral Daily With Breakfast Rogers Velez MD   levothyroxine 112 mcg Oral Early Morning Rogers Velez MD   lidocaine 1 patch Topical Daily Rogers Velez MD   lidocaine 1 patch Topical Daily Rogers Velez MD   losartan 50 mg Oral HS Linda Wayne MD   [START ON 1/30/2019] methocarbamol 500 mg Oral TID Ronn Acevedo MD   metoprolol tartrate 25 mg Oral Q12H [facility] Rogers Velez MD   nystatin  Topical BID Rogers Velez MD   oxyCODONE 2 5 mg Oral Q6H PRN Rogers Velez MD   polyethylene glycol 17 g Oral Daily PRN Rogers Velez MD   senna 1 tablet Oral HS Rogers Velez MD   sertraline 25 mg Oral Daily Rogers Velez MD   trolamine salicylate 1 application Topical 4x Daily PRN Ewelina Perales PA-C        Objective:    Functional Update:   OT: toileting mod assist; transfers min assist;  PT:  ambulation supervision at times    Allergies per EMR    Physical Exam:  Temp:  [97 6 °F (36 4 °C)-98 2 °F (36 8 °C)] 97 6 °F (36 4 °C)  HR:  [] 58  Resp:  [18] 18  BP: (128-173)/(58-74) 128/58  SpO2:  [61 %-98 %] 98 %    General: Awake, alert in NAD  HENT:  MMM, Cervical collar in place   Respiratory: Suboptimal lung expansion bilaterally with slight decreased sounds at the bases; no wheezes, rales, or rhonchi  Cardiovascular: Regular rate and rhythm, no murmurs, rubs, or gallops  Gastrointestinal: Soft, non-tender, non-distended, normoactive bowel sounds  Genitourinary: No gomez  SkiN/MSK/Extremities:  No calf edema or calf tenderness to palpation  Neurologic/Psych:   MENTAL STATUS: orientation intact  Affect: Reasonably positive   Strength/MMT:  Near 5/5 throughout    Diagnostic Studies: Reviewed  XR follow up   Final Result by Franklyn Panda MD (01/29 1848)      Anterior displacement at the odontoid fracture is unchanged from exam earlier in the same day, but increased from January 11  Unchanged alignment at C6 anterior syndesmophyte fracture  Workstation performed: [id number]         XR spine cervical 2 or 3 vw injury   Final Result by Mar Goldstein MD (01/28 1142)      Increased anterior displacement of odontoid fracture  Workstation performed: [id number]         CT chest abdomen pelvis wo contrast   Final Result by Andrew Rust MD (01/25 1954)      No suspicious appearing masses are seen  There is an approximately 1 cm groundglass attenuation density in the right lower lobe  Suggest reassessment with repeat chest CT in 3-6 months  Relatively mild colonic diverticulosis  Workstation performed: [id number]         CT head wo contrast   Final Result by Andrew Rust MD (01/25 1943)      No acute intracranial abnormality  Moderate chronic microvascular ischemic change in periventricular white matter and cerebral atrophy  Workstation performed: [id number]         XR chest pa & lateral   Final Result by Anita hCase DO (01/25 9610)   No acute cardiopulmonary disease              Workstation performed: [id number]             Laboratory: Reviewed    Results from last 7 days  Lab Units 01/28/19  0541 01/24/19  0632   HEMOGLOBIN g/dL 10 8* 10 6*   HEMATOCRIT % 34 2* 34 0*   WBC Thousand/uL 5 85 10 28*       Results from last 7 days  Lab Units 01/29/19  0524 01/28/19  0541 01/27/19  0510   BUN mg/dL 41* 47* 49*   SODIUM mmol/L 137 138 136   POTASSIUM mmol/L 4 8 4 6 4 8   CHLORIDE mmol/L 102 102 100   CREATININE mg/dL 1 18 1 11 1 32*            Patient Active Problem List   Diagnosis    Closed nondisplaced fracture of second cervical vertebra (HCC)    Neck pain, acute    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)    C6 cervical fracture (HCC)    Hypertension    Hypothyroidism    Fall    Forgetfulness    Ambulatory dysfunction    Physical deconditioning    Acute pain    Delirium    Renovascular hypertension    Chronic diastolic heart failure (HCC)    History of CVA (cerebrovascular accident)    Type 2 diabetes mellitus with diabetic polyneuropathy (HCC)    Depression    Acute renal failure superimposed on chronic kidney disease (HCC)    Rheumatoid arthritis involving multiple sites ([facility] [address] )    Fibromyalgia    History of aortic valve replacement with bioprosthetic valve    Hyponatremia    Renal artery stenosis (HCC)    Parenchymal renal hypertension    Pain    Lesion of left lung       ** Please Note: [employer] Direct voice to text software may have been used in the creation of this document  **    Total visit time:  At least 25 minutes, with more than 50% spent counseling/coordinating care

## 2019-01-30 NOTE — PROGRESS NOTES
Occupational Therapy Treatment Note       01/30/19 0700   Pain Assessment   Pain Assessment 0-10   Pain Score 5   Pain Type Acute pain   Pain Location [facility]   Hospital Pain Intervention(s) Repositioned   Response to Interventions tolerates session    Restrictions/Precautions   Precautions Bed/chair alarms; Fall Risk;Supervision on toilet/commode;Spinal precautions   Braces or Orthoses C/S Collar   Lifestyle   Autonomy "It really hurts where I keep bleeding" - pt with skin tears on labia, RN Rosalio Emery aware and present to cover at end of session    QI: Shower/Bathe Self   Assistance Needed Physical assistance   Assistance Provided by [place] Less than 25%   Shower/Bathe Self CARE Score 3   Bathing   Assessed Bath Style Shower   Anticipated D/C Bath Style Shower   Able to [place] No   Able to [employer] Temperature Yes   Able to Wash/Rinse/Dry (body part) Left Arm;Right Arm;L Upper Leg;R Upper Leg;L Lower Leg/Foot;R Lower Leg/Foot;Chest;Abdomen   Limitations Noted in Balance; Endurance; Safety;Strength;Timeliness   Positioning Seated;Standing   Adaptive Equipment Hand Held Shower; Shower [employer]; Shower Seat;Longhand Reacher   Findings  pt requires steadying assist in stance and assistance to bathe buttock 2* spinal precautions and body habitus  Pt has long sponge at home she will be able to use to increase independence in LB bathing while seated  Bathing (FIM) 4 - Patient completes 8/10 or 9/10 parts   Tub/Shower Transfer   Limitations Noted In Balance; Safety; Endurance;UE Strength;LE Strength   Adaptive Equipment Grab Bars;Seat with Back   Assessed Shower   Findings pt requires steadying assist for walk in shower transfer using RW and then B/L grab bars    Shower Transfer (FIM) 4 - Patient requires steadying assist or light touching   QI: Lower Body Dressing   Assistance Needed Physical assistance   Assistance Provided by [place] 25%-49%   Comment steadying assist in stance while managing off brief    Lower Body Dressing CARE Score 3   QI: Putting On/Taking Off Footwear   Comment pt able to doff socks while seated using dressing stick with supervision, OT to trial pt's ability to don socks using sock aide during future session 2* time constraint    Dressing/Undressing Clothing   Findings pt requires assist x2 for c/s collar management as pt is unable to fit into [place] collar and unable to tolerate lying flat  While seated in recliner chair assist x1 supports 1 piece of c/s collar and pt's head while assist of 2nd changes c/s collar pads after they are wet from shower  Pt able to doff tank top overhead while seated with supervision, unable to don 2* time constraint at this time    UB Dressing (FIM) 5 - Patient requires supervision/monitoring   QI: Sit to Stand   Assistance Needed Incidental touching   Assistance Provided by [place] Less than 25%   Sit to Stand CARE Score 3   QI: Chair/Bed-to-Chair Transfer   Assistance Needed Incidental touching   Assistance Provided by [place] Less than 25%   Comment RW   Chair/Bed-to-Chair Transfer CARE Score 3   Transfer Bed/Chair/Wheelchair   Limitations Noted In Balance; Endurance;Pain Management;UE Strength;LE Strength   Adaptive Equipment Roller Walker   Sit to Stand Minimal;Assist x 1   Stand to Sit Minimal;Assist x 1   Findings pt requires min assist for steadying with RW for sit <> stand and functinal mobility within room  Bed, Chair, Wheelchair Transfer (FIM) 4 - Patient requires steadying assist or light touching   Cognition   Arousal/Participation Alert; Cooperative   Attention Attends with cues to redirect   Orientation Level Oriented to person;Oriented to place;Oriented to situation   Following Commands Follows one step commands without difficulty   Comments pt presents with improved recall of precautions, is able to identify need to not bend forward during ADL tasks   OT did educate pt that she may hinge forward at hips while maintaining spine in straight position to assist with ADL tasks  Activity Tolerance   Activity Tolerance Patient tolerated treatment well   Assessment   Treatment Assessment Pt participated in skilled 70 minute OT tx session focused on bathing and functional mobility/transfers  See above for further details on functional performance  Pt continues to require extensive time for functional mobility and ADL routine, in 70 minute session able to accomplish functional mobility/transfers within room, shower transfer, bathing routine, unable to complete full dressing routine  OT recommending at least 90 minute session to complete bathing/dressing routine 2* pt's decreased activity tolerance and speed during functional mobility  Pt demonstrates progress in shower transfer, completes with min assist x1 using grab bars (which pt has at home)  Pt reports having shower chair installed in home shower that pulls down from wall at home  Pt requires contact guard assist for sit to stand transfer, min assist for stand to sit transfer with RW  Pt will benefit from continued long handled adaptive equipment training, and dressing tasks, as well as standing balance during toileting tasks at home  Continue OT plan of care with focus on dressing and toileting tasks, as well as standing balance, and family training scheduled for Friday February 1st  Plan for pt to D/C home with supervision for all functional mobility/transfers and supervision/assistance for all ADLS, as well as home OT services  OT to assess DME needs with pt and pt's daughter on Friday  Plan for pt to D/C next Wednesday February 6  Problem List Decreased strength;Decreased endurance;Decreased range of motion; Impaired balance;Decreased mobility; Decreased skin integrity;Orthopedic restrictions;Pain   Plan   Treatment/Interventions ADL retraining;Functional transfer training; Therapeutic exercise; Endurance training;Patient/family training;Cognitive reorientation; Bed mobility; Equipment eval/education; Compensatory technique education   Progress Progressing toward goals   Recommendation   OT Discharge Recommendation Home OT   OT Therapy Minutes   OT Time In 0700   OT Time Out 0810   OT Total Time (minutes) 70   OT Mode of treatment - Individual (minutes) 70   OT Mode of treatment - Concurrent (minutes) 0   OT Mode of treatment - Group (minutes) 0   OT Mode of treatment - Co-treat (minutes) 0   OT Mode of Teatment - Total time(minutes) 70 minutes   Therapy Time missed   Time missed?  No       Afsaneh De Paz MS, OTR/L , CBIS

## 2019-01-30 NOTE — TEAM CONFERENCE
Acute RehabilitationTeam Conference Note  Date: 1/30/2019   Time: 10:11 AM       Patient Name:  Ramona Malloy       Medical Record Number: [id number]   YOB: 1934  Sex: Female          Room/Bed:  [facility]01  Payor Info:  Payor: [id number] [place] [address] / Plan: [facility] REP / Product Type: Medicare HMO /      Admitting Diagnosis: Fracture Viry Peels  8XXA]   Admit Date/Time:  1/17/2019  4:48 PM  Admission Comments: No comment available     Primary Diagnosis:  Ambulatory dysfunction  Principal Problem: Ambulatory dysfunction    Patient Active Problem List    Diagnosis Date Noted    Pain 01/29/2019    Lesion of left lung 01/29/2019    Renal artery stenosis (HCC) 01/24/2019    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    Hyponatremia 01/21/2019    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    Chronic diastolic heart failure ([facility] ) 01/17/2019    History of CVA (cerebrovascular accident) 01/17/2019    Type 2 diabetes mellitus with diabetic polyneuropathy ([facility] ) 01/17/2019    Depression 01/17/2019    Acute renal failure superimposed on chronic kidney disease ([facility] ) 01/17/2019    Rheumatoid arthritis involving multiple sites ([facility] [facility] ) 01/17/2019    Fibromyalgia 01/17/2019    History of aortic valve replacement with bioprosthetic valve 01/17/2019    Delirium 01/11/2019    Fall 01/10/2019    Forgetfulness 01/10/2019    Ambulatory dysfunction 01/10/2019    Physical deconditioning 01/10/2019    Acute pain 01/10/2019    Closed nondisplaced fracture of second cervical vertebra ([facility] ) 01/09/2019    Neck pain, acute 01/09/2019    Type III fracture of odontoid process ([facility] ) 01/09/2019    C6 cervical fracture ([facility] ) 01/09/2019    Hypertension 01/09/2019    Hypothyroidism 01/09/2019       Physical Therapy:    Weight Bearing Status: Full Weight Bearing  Transfers:  Moderate Assistance  Bed Mobility:  (NA, sleeps in recliner)  Amulation Distance (ft): 25 feet  Ambulation: Minimal Assistance, Contact Guard  Assistive Device for Ambulation: Roller Walker  Wheelchair Mobility Distance:  (NA)  Number of Stairs: 1  Assistive Device for Stairs: [address]: Moderate Assistance  Ramp: Minimal Assistance  Assistive Device for Ramp: Roller Walker  Discharge Recommendations: Home with:  [address] with[de-identified] Family Support, Home Physical Therapy    1/22/19  Pt presents with decline in function  Pt c/o pain ion neck, spasms between shoulders, radiating to head limiting her ability to participate at times  Pt retropulsive with STS transfers, relying on B UE more than legs  CGA/MiN A with ambulation, but short distances only at this time  Slow progress towards LTGs  Will benefit form continued skilled therapy to maximize functional mobility, decrease  [place] of care and risk for falls  Occupational Therapy:  Eating: Supervision  Grooming: Minimal Assistance  Bathing: Moderate Assistance  Bathing: Moderate Assistance  Upper Body Dressing: Minimal Assistance  Lower Body Dressing: Minimal Assistance  Toileting: Minimal Assistance  Tub/Shower Transfer: Moderate Assistance  Toilet Transfer: Minimal Assistance  Cognition: Exceptions to WNL  Cognition: Decreased Memory, Decreased Safety, Decreased Comprehension, Decreased Attention  Orientation: Person, Place, Time, Situation  Discharge Recommendations: Home with:  [address] with[de-identified] Family Support       Pt making slow progress towards LTGs  Pt continues to present with decreased activity tolerance/endurance, decreased standing balance/tolerance, dec UE strength/ROM, decreased memory and pain  Pt with supportive daughter who has been present for family training on C collar management  Pt will continue to benefit from skilled OT services to address above mentioned barriers and maximize functional independence in baseline areas of occupation   OT D/C recommendation is for re-team                Speech Therapy:           No notes on file    Nursing Notes:  Appetite: Good  Diet Type: Cardiac                                          Wound 01/26/19 Moisture associated skin damage Vagina Right;Left small open areas from wearing depends & + moisture (Active)   Wound Description Beefy red 1/30/2019  7:27 AM   Olga-wound Assessment Pink 1/30/2019  7:27 AM   Drainage Amount Moderate 1/30/2019  7:27 AM   Drainage Description Bloody 1/30/2019  7:27 AM   Treatments Cleansed;Site care 1/30/2019  7:27 AM   Dressing Other (Comment); Protective barrier 1/30/2019  7:27 AM                             Pain Location: Head  Pain Orientation: Right  Pain Score: 7                       Hospital Pain Intervention(s): Medication (See MAR), Rest          Admitted s/p Traumatic Type 3 odontoid fracture s/p fall-C2 and C6 fractures (non-op)  Wears Cervical collar AAT  Pain is managed with Tylenol, neurontin, lidocaine patches, robaxin and oxycodone as well as home med Aspercream  Pt has HX: CHF, HTN, anemia, B/L LE edema, hyponatremia, DM, AVR, stroke, arthritis, constipation, anxiety, depression, hypothyroid  She is currently taking Plavix for anti-coag, and uses compression stockings from home  HTN managed with lopressor, norvasc, lasix  She can be Forgetful, occasional confusion at night  And has Patient states she has facial numbness which is not new  She is on a 1200cc F R  We will monitor lab values, vital signs and work on pain management  We will work with patient on proper use of cervical collar  We will educate pt on turning, repositioning and weight shifts to prevent skin breakdown  Pt will practice safety awareness and remain free from injury  Case Management:     Discharge Planning  Living Arrangements: Children  Support Systems: Protestant/bianka community  Assistance Needed: yes  Type of Current Residence: Private residence  Current Home Care Services: No  Pt is participating with therapy and expects to return home   Family is supportive and aware of potential recommendations for contd therapy services  Following to assist w/dc planning needs  Is the patient actively participating in therapies? yes  List any modifications to the treatment plan:     Barriers Interventions   Standing balance, righting reactions Therapy exercises   Gait speed  Therapy exercises,    Spinal precautions Family education   stairs Therapy stair training, family education         Is the patient making expected progress toward goals? yes  List any update or changes to goals:     Medical Goals: Patient will be medically stable for discharge to [facility] upon completion of rehab program and Patient will be able to manage medical conditions and comorbid conditions with medications and follow up upon completion of rehab program    Weekly Team Goals:   Rehab Team Goals  ADL Team Goal: Patient will require supervision with ADLs with least restrictive device upon completion of rehab program  Bowel/Bladder Team Goal: Patient will return to premorbid level for bladder/bowel management upon completion of rehab program  Transfer Team Goal: Patient will require supervision with transfers with least restrictive device upon completion of rehab program  Locomotion Team Goal: Patient will require supervision with locomotion with least restrictive device upon completion of rehab program  Cognitive Team Goal: Patient will be independent for basic  tasks and require supervision for complex tasks upon completion of rehab program    Discussion: pt presents with above barriers and is making progress  Pain is better controlled  Pt is close supervision for transfers and walking  Pt is mod a adls and recommendations will be for supervision on dc  Pt has stairs to enter and will need supervision on dc  Family education and training to occur on Friday  Recommendations are for contd rn pt and ot services    Anticipated Discharge Date:  2/6/19  [facility] Team Members Present: The following team members are supervising care for this patient and were present during this Weekly Team Conference      Physician: Dr Charisma Angel MD  : Mya Guerra MSW  Registered Nurse: Marino Ribeiro, RN, CRRN  Physical Therapist: RAQUEL GalvezT  Occupational Therapist: Winnie Lucero MS, OTR/L, CBIS  Speech Therapist:   Other:

## 2019-01-30 NOTE — PROGRESS NOTES
Internal Medicine Progress Note  Patient: Aamir Brown  Age/sex: 80 y o  female  Medical Record #: [id number]      ASSESSMENT/PLAN:  Aamir Brown is seen and examined and management for following issues:    Type III odontoid fracture with fracture line extending to the right/left superior articular facets and left transverse foramen of C2; nondisplaced fracture through bulky, bridging osteophytes at the C6 level:  required no surgery  Continue collar  Pt reported severe Rt facial pain on 1/19 and an afternoon dose of gabapentin was added  Facial pain she describes is intermitt and in front of ears      HTN with hx right RA stent: per renal = acceptable on Norvasc 5 mg qd, Cozaar 50mg qd, Lopressor 25mg q12hrs    Hyponatremia:  Resolved; renal is following  The NACL tabs 1 Gm BID are now stopped  Torsemide was stopped and now she is now back on Lasix 20 mg qd as at home; continue FR 1200  Renal ordered CXR 1/24 (negative) and CT C/A/P 1/25 negative for etiology for hyponatremia  Ground-glass attenuation RLL lower lung base: For repeat CT chest 3-6 months        CKD; baseline 1 1-1 3:  baseline today     Hx bioAVR 2012: had no significant disease when had card cath before surgery     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 2016:  continue Plavix that she has been on since CVA 2005; with her TIA 2016, neuro kept Plavix; she is intol statins and uses Red yeast rice and Fish oil at home     DM diet controlled:   watching fasting blood sugars  Today was 81      Chronic diastolic CHF, LVEF 39%, mild-mod MR/mild TR and norm function AVR 2016:  on Lasix 20mg qd as at home  Follows with Dr Endy Bradshaw  Family says LE edema is less than at home  CXR 1/24 was negative for CHF     RA/SLE; chr pain/fibromyalgia:  continue Plaquenil for RA; on Hydrocodone at home for chr pain      Hypothyroidism:  continue current Levothyroxine     Depression: Zoloft     Hx breast cancer: follows with Dominique Robert      Subjective:   Other than the intermitt discomfort from collar, denies any complaints      ROS:   GI: denies abdominal pain, change bowel habits or reflux symptoms  Neuro: +Right facial pain  Respiratory: No Cough, SOB  Cardiovascular: No CP, palpitations     Scheduled Meds:    Current Facility-Administered Medications:  acetaminophen 975 mg Oral [facility] Hammad Og MD   albuterol 2 puff Inhalation Q4H PRN Hammad Og MD   amLODIPine 5 mg Oral Daily Ladonna Muir MD   barium sulfate 450 mL Oral Once in imaging Ladonna Muir MD   calcium carbonate-vitamin D 1 tablet Oral BID With Meals Hammad Og MD   clopidogrel 75 mg Oral Daily Hammad Og MD   docusate sodium 100 mg Oral BID Hammad Og MD   enoxaparin 40 mg Subcutaneous Q24H [address] Hammad Og MD   fish oil 1,000 mg Oral Daily Hammad Og MD   furosemide 20 mg Oral Daily Essie Palumbo MD   gabapentin 500 mg Oral BID Hammad Og MD   hydroxychloroquine 200 mg Oral Daily With Breakfast Hammad Og MD   levothyroxine 112 mcg Oral Early Morning Hammad Og MD   lidocaine 1 patch Topical Daily Hammad Og MD   lidocaine 1 patch Topical Daily Hammad Og MD   losartan 50 mg Oral HS Ladonna Muir MD   methocarbamol 500 mg Oral TID Ivy Arreola MD   metoprolol tartrate 25 mg Oral Q12H [address] Hammad Og MD   nystatin  Topical BID Hammad Og MD   oxyCODONE 2 5 mg Oral Q6H PRN Hammad Og MD   polyethylene glycol 17 g Oral Daily PRN Hammad Og MD   senna 1 tablet Oral HS Hammad Og MD   sertraline 25 mg Oral Daily Hammad Og MD   trolamine salicylate 1 application Topical 4x Daily PRN Ewelina Perales PA-C       Labs:       Results from last 7 days  Lab Units 01/28/19  0541 01/24/19  0632   WBC Thousand/uL 5 85 10 28*   HEMOGLOBIN g/dL 10 8* 10 6*   HEMATOCRIT % 34 2* 34 0*   PLATELETS Thousands/uL 193 206       Results from last 7 days  Lab Units 01/30/19  0620 01/29/19  0524   SODIUM mmol/L 135* 137   POTASSIUM mmol/L 4 5 4 8   CHLORIDE mmol/L 101 102   CO2 mmol/L 29 28   BUN mg/dL 40* 41*   CREATININE mg/dL 1 18 1 18   CALCIUM mg/dL 9 2 9 1                    [unfilled]    Labs reviewed    Physical Examination:  Vitals:   Vitals:    01/29/19 1329 01/29/19 1624 01/29/19 2036 01/30/19 0612   BP: 128/58  134/70 152/66   BP Location: Left arm  Left arm Left arm   Pulse: 104 58 63 60   Resp: 18  18 16   Temp: 97 6 °F (36 4 °C)  98 2 °F (36 8 °C) 97 6 °F (36 4 °C)   TempSrc: Oral  Oral Oral   SpO2: 98%  98% 98%   Weight:       Height:           HEENT:  No thrush  RESP: CTAB, no R/R/W; resp unlabored  CV: +S1 S2, regular rate, no rubs/murmurs  ABD: soft, NT, ND, normal BS   EXT: edema of LEs L>R is stable  Neuro: AAO; [place] 4/5      [ X ] Total time spent: 30 Mins and greater than 50% of this time was spent counseling/coordinating care  ** Please Note: Dragon 360 Dictation voice to text software may have been used in the creation of this document   **

## 2019-01-30 NOTE — ASSESSMENT & PLAN NOTE
CT C/A/P - 1 cm groundglass attenuation density in the right lower lobe  Suggest reassessment with repeat chest CT in 3-6 months

## 2019-01-30 NOTE — ASSESSMENT & PLAN NOTE
Improving neck and associated myofascial pain improving  Multifactorial due to trigger points and acute injury, as well as a component of central sensitization due to her fibromyalgia  - As there is Tylenol in her home medication, she was instructed to monitor her intake and limit to 3g daily    - Robaxin 500mg QID as needed for muscle spasms  - Her gabapentin was titrated up to 600mg BID which helped significantly with her central sensitization/fibro  Dosing adjusted for kidney function  - She is titrating off her opioids  She will return to using her home medications, Hydrocodone 5mg/Tylenol 325mg which she will continue to titrate down  She was only using narcotics 2x a day at discharge  She was not given a prescription for oxycodone    - Lidocaine patch as needed for 12 hours at a time

## 2019-01-30 NOTE — PROGRESS NOTES
01/30/19 1100   Pain Assessment   Pain Assessment No/denies pain   Pain Score No Pain   Restrictions/Precautions   Precautions Bed/chair alarms; Fall Risk;Supervision on toilet/commode;Spinal precautions   Weight Bearing Restrictions No   ROM Restrictions Yes   Braces or Orthoses C/S Collar   Lifestyle   Autonomy "I'm not reall having any pain right now"   QI: Eating   Assistance Needed Set-up / [address] Provided by [place] No physical assistance   Eating CARE Score 5   Eating Assessment   Eating (FIM) 5 - Patient needs help to open contianers or set up tray   QI: [address] Provided by [place] No physical assistance   Comment Pt engaged in activity that involved retrieving bean bags from the floor using a reacher  Pt able to retrieve all bean bags by using reacher toss them across the floor closer to the bin  Pt then proceeded to  each one and place it into the bin  Pt demos G safety awareness with RW position  Pt demos little difficulty with activity  Pt demos compliance with spinal precautions remembering not to bend to  objects  Picking Up Object CARE Score 4   QI: Sit to Stand   Assistance Needed Incidental touching   Assistance Provided by [place] No physical assistance   Comment Pt did not need any boosting during this session   Sit to Stand CARE Score 4   QI: Chair/Bed-to-Chair Transfer   Assistance Needed Incidental touching   Assistance Provided by [place] No physical assistance   Comment Pt did not need any boosting during session  Continues to require steadying A 2* to decreased standing balance  Chair/Bed-to-Chair Transfer CARE Score 4   Transfer Bed/Chair/Wheelchair   Limitations Noted In Balance; Endurance;LE Strength;UE Strength   Adaptive Equipment Roller Walker   Bed, Chair, Wheelchair Transfer (FIM) 4 - Patient requires steadying assist or light touching   Functional Standing Tolerance   Time ~10 minutes   Activity Static standing at table   Comments Pt engaged in parquetry puzzle activity to focus on unsupported standing tolerance/balance to increase independence in I/ADL tasks  Pt able to stand unsupported with ~10 minutes with a rest break in between to adjust table height for Pt but Pt did not require a rest break on her own  Pt demonstrates improved standing tolerance as evidenced by no significant signs of fatigue during activity  Pt able to complete first puzzle that with fixed figure pattern with overall CS  Pt able to complete additional open ended pattern that requires pt to problem solve fitting numerous pieces to make one shape  Pt requires more time to figure out open ended pattern but does not require any VC's to finish puzzle  Cognition   Overall Cognitive Status Impaired   Arousal/Participation Alert; Cooperative   Attention Attends with cues to redirect   Orientation Level Oriented X4   Memory Decreased short term memory   Following Commands Follows one step commands without difficulty   Activity Tolerance   Activity Tolerance Patient tolerated treatment well   Assessment   Treatment Assessment Pt participated in skilled OT services to focus on standing balance/tolerance and item retrieval from ground using a reacher and RW to increase independence with I/ADLs  See above for details on standing balance/tolerance and item retrieval  Pt demos G compliance with orthopedic restrictions and does not require any VC's  Pt overall progressing towards LTGs but continues to be limited by decreased standing balance, decreased general strength, decreased endurance, decreased UE ROM, and orthopedic restrictions which limits pt's ability to complete I/ADL tasks independently  Pt would benefit from skilled OT services to focus on decreased standing balance, decreased endurance, and family training to increase safety and independence with ADL tasks      Prognosis Fair   Problem List Decreased strength;Decreased range of motion;Decreased endurance; Impaired balance;Decreased mobility; Decreased cognition;Decreased safety awareness;Decreased skin integrity;Orthopedic restrictions;Pain   Plan   Treatment/Interventions ADL retraining;Functional transfer training; Therapeutic exercise; Endurance training;Patient/family training; Compensatory technique education   Progress Progressing toward goals   Recommendation   OT Discharge Recommendation Home OT   OT Therapy Minutes   OT Time In 1100   OT Time Out 1130   OT Total Time (minutes) 30   OT Mode of treatment - Individual (minutes) 20   OT Mode of treatment - Concurrent (minutes) 10   OT Mode of treatment - Group (minutes) 0   OT Mode of treatment - Co-treat (minutes) 0   OT Mode of Teatment - Total time(minutes) 30 minutes   Therapy Time missed   Time missed?  No

## 2019-01-30 NOTE — SOCIAL WORK
Clinical update faxed to amisha kimble at [facility] [phone], requesting an additional week  mssg left on dtrs voice mail with update from team and potential dc date  Following to assist w/dc planning needs

## 2019-01-30 NOTE — PROGRESS NOTES
NEPHROLOGY PROGRESS NOTE   Samir Ordoñez 80 y o  female MRN: [id number]  Unit/Bed#: -01 Encounter: [id number]  Reason for Consult: Hyponatremia    ASSESSMENT and PLAN:    1 ) Hyponatremia --> resolved  -serum sodium stable at 135, 137 yesterday  -sodium has improved and nice and correct rate with no evidence of over-correction  -off sodium chloride tablets  -currently on a fluid restriction, 1 2 L per day  -currently on furosemide 20 mg daily  -history of hypovolemia/SIADH  -BMP tomorrow     2 ) Acute kidney injury --> resolved  -renal function back to baseline  -continue furosemide  -creatinine stable at 1 18 today     3 ) Chronic kidney disease stage II/III  -baseline creatinine is around 1 mg/dL     4 ) Hypertension  -last blood pressure was running on the higher side but overall has been acceptable  -blood pressure still remains elevated  -if blood pressure still high tomorrow renal function stable along with sodium level we can increase the furosemide to 40 mg or 20 mg b i d   -continue Lasix 20 mg      SUBJECTIVE / INTERVAL HISTORY:    No overnight events    OBJECTIVE:  Current Weight: Weight - Scale: 84 1 kg (185 lb 6 5 oz)  Vitals:    01/29/19 1329 01/29/19 1624 01/29/19 2036 01/30/19 0612   BP: 128/58  134/70 152/66   BP Location: Left arm  Left arm Left arm   Pulse: 104 58 63 60   Resp: 18  18 16   Temp: 97 6 °F (36 4 °C)  98 2 °F (36 8 °C) 97 6 °F (36 4 °C)   TempSrc: Oral  Oral Oral   SpO2: 98%  98% 98%   Weight:       Height:           Intake/Output Summary (Last 24 hours) at 01/30/19 1105  Last data filed at 01/30/19 0830   Gross per 24 hour   Intake              180 ml   Output              500 ml   Net             -320 ml       Review of Systems:    12 point ROS has been reviewed  Physical Exam   Constitutional: She is oriented to person, place, and time  She appears well-developed and well-nourished  No distress  HENT:   Head: Normocephalic and atraumatic     Eyes: Pupils are equal, round, and reactive to light  No scleral icterus  Neck: Normal range of motion  Neck supple  Cardiovascular: Normal rate, regular rhythm and normal heart sounds  Exam reveals no gallop and no friction rub  No murmur heard  Pulmonary/Chest: Effort normal and breath sounds normal  No respiratory distress  She has no wheezes  She has no rales  She exhibits no tenderness  Abdominal: Soft  Bowel sounds are normal  She exhibits no distension  There is no tenderness  There is no rebound  Musculoskeletal: Normal range of motion  She exhibits no edema  Neurological: She is alert and oriented to person, place, and time  Skin: No rash noted  She is not diaphoretic  Psychiatric: She has a normal mood and affect  Nursing note and vitals reviewed        Medications:    Current Facility-Administered Medications:     acetaminophen (TYLENOL) tablet 975 mg, 975 mg, Oral, Q8H [address], Zarina Flood MD, 975 mg at 01/30/19 0633    albuterol (PROVENTIL HFA,VENTOLIN HFA) inhaler 2 puff, 2 puff, Inhalation, Q4H PRN, Zarina Flood MD    amLODIPine (NORVASC) tablet 5 mg, 5 mg, Oral, Daily, Lulu Dyson MD, 5 mg at 01/30/19 0855    barium sulfate 2 1 % suspension 450 mL, 450 mL, Oral, Once in imaging, Lulu Dyson MD    calcium carbonate-vitamin D (OSCAL-D) 500 mg-200 units per tablet 1 tablet, 1 tablet, Oral, BID With Meals, Zarina Flood MD, 1 tablet at 01/30/19 0855    clopidogrel (PLAVIX) tablet 75 mg, 75 mg, Oral, Daily, Zarina Flood MD, 75 mg at 01/30/19 0855    docusate sodium (COLACE) capsule 100 mg, 100 mg, Oral, BID, Zarina Flood MD, 100 mg at 01/30/19 0856    enoxaparin (LOVENOX) subcutaneous injection 40 mg, 40 mg, Subcutaneous, Q24H [address], Zarina Flood MD, 40 mg at 01/30/19 0856    fish oil capsule 1,000 mg, 1,000 mg, Oral, Daily, Zarina Flood MD, 1,000 mg at 01/30/19 0857    furosemide (LASIX) tablet 20 mg, 20 mg, Oral, Daily, Madison Mcmahon MD, 20 mg at 01/30/19 1699    gabapentin (NEURONTIN) capsule 500 mg, 500 mg, Oral, BID, Rogelio Espinoza MD, 500 mg at 01/30/19 0854    hydroxychloroquine (PLAQUENIL) tablet 200 mg, 200 mg, Oral, Daily With Breakfast, Rogelio Espinoza MD, 200 mg at 01/30/19 0857    levothyroxine tablet 112 mcg, 112 mcg, Oral, Early Morning, Rogelio Espinoza MD, 112 mcg at 01/30/19 5881    lidocaine (LIDODERM) 5 % patch 1 patch, 1 patch, Topical, Daily, Rogelio Espinoza MD, 1 patch at 01/30/19 0811    lidocaine (LIDODERM) 5 % patch 1 patch, 1 patch, Topical, Daily, Rogelio Espinoza MD, 1 patch at 01/30/19 0811    losartan (COZAAR) tablet 50 mg, 50 mg, Oral, HS, Valdemar Vanessa MD, 50 mg at 01/29/19 2230    methocarbamol (ROBAXIN) tablet 500 mg, 500 mg, Oral, TID, Papi Cummings MD, 500 mg at 01/30/19 5252    metoprolol tartrate (LOPRESSOR) tablet 25 mg, 25 mg, Oral, Q12H [address], Rogelio Espinoza MD, 25 mg at 01/30/19 0855    nystatin (MYCOSTATIN) powder, , Topical, BID, Rogelio Espinoza MD    oxyCODONE (ROXICODONE) IR tablet 2 5 mg, 2 5 mg, Oral, Q6H PRN, Rogelio Espinoza MD, 2 5 mg at 01/30/19 0859    polyethylene glycol (MIRALAX) packet 17 g, 17 g, Oral, Daily PRN, Rogelio Espinoza MD    [facility]) tablet 8 6 mg, 1 tablet, Oral, HS, Rogelio Espinoza MD, 8 6 mg at 01/29/19 2231    sertraline (ZOLOFT) tablet 25 mg, 25 mg, Oral, Daily, Rogelio Espinoza MD, 25 mg at 01/29/19 2230    trolamine salicylate (ASPERCREME) 10 % cream 1 application, 1 application, Topical, 4x Daily PRN, Ewelina Perales PA-C, 1 application at 30/51/66 7824    Laboratory Results:    Results from last 7 days  Lab Units 01/30/19  0620 01/29/19  0524 01/28/19  0541 01/27/19  0510 01/26/19  0605 01/25/19  0504 01/24/19  0632   WBC Thousand/uL  --   --  5 85  --   --   --  10 28*   HEMOGLOBIN g/dL  --   --  10 8*  --   --   --  10 6*   HEMATOCRIT %  --   --  34 2*  --   --   --  34 0*   PLATELETS Thousands/uL  --   --  193  --   --   --  206   POTASSIUM mmol/L 4 5 4 8 4 6 4 8 4 6 4 5 4 7   CHLORIDE mmol/L 101 102 102 100 96* 96* 98*   CO2 mmol/L 29 28 28 31 25 29 27   BUN mg/dL 40* 41* 47* 49* 51* 42* 39*   CREATININE mg/dL 1 18 1 18 1 11 1 32* 1 36* 1 40* 1 20   CALCIUM mg/dL 9 2 9 1 8 8 9 0 8 9 9 0 9 1   MAGNESIUM mg/dL  --   --   --   --   --  2 6  --

## 2019-01-31 PROBLEM — N18.30 STAGE 3 CHRONIC KIDNEY DISEASE (HCC): Status: ACTIVE | Noted: 2019-01-17

## 2019-01-31 LAB
ANION GAP SERPL CALCULATED.3IONS-SCNC: 6 MMOL/L (ref 4–13)
BASOPHILS # BLD AUTO: 0.03 THOUSANDS/ΜL (ref 0–0.1)
BASOPHILS NFR BLD AUTO: 1 % (ref 0–1)
BUN SERPL-MCNC: 38 MG/DL (ref 5–25)
CALCIUM SERPL-MCNC: 9.1 MG/DL (ref 8.3–10.1)
CHLORIDE SERPL-SCNC: 101 MMOL/L (ref 100–108)
CO2 SERPL-SCNC: 29 MMOL/L (ref 21–32)
CREAT SERPL-MCNC: 1.18 MG/DL (ref 0.6–1.3)
EOSINOPHIL # BLD AUTO: 0.2 THOUSAND/ΜL (ref 0–0.61)
EOSINOPHIL NFR BLD AUTO: 3 % (ref 0–6)
ERYTHROCYTE [DISTWIDTH] IN BLOOD BY AUTOMATED COUNT: 13 % (ref 11.6–15.1)
GFR SERPL CREATININE-BSD FRML MDRD: 42 ML/MIN/1.73SQ M
GLUCOSE SERPL-MCNC: 84 MG/DL (ref 65–140)
HCT VFR BLD AUTO: 35.7 % (ref 34.8–46.1)
HGB BLD-MCNC: 11.2 G/DL (ref 11.5–15.4)
IMM GRANULOCYTES # BLD AUTO: 0.03 THOUSAND/UL (ref 0–0.2)
IMM GRANULOCYTES NFR BLD AUTO: 1 % (ref 0–2)
LYMPHOCYTES # BLD AUTO: 2.14 THOUSANDS/ΜL (ref 0.6–4.47)
LYMPHOCYTES NFR BLD AUTO: 34 % (ref 14–44)
MCH RBC QN AUTO: 31.5 PG (ref 26.8–34.3)
MCHC RBC AUTO-ENTMCNC: 31.4 G/DL (ref 31.4–37.4)
MCV RBC AUTO: 101 FL (ref 82–98)
MONOCYTES # BLD AUTO: 0.46 THOUSAND/ΜL (ref 0.17–1.22)
MONOCYTES NFR BLD AUTO: 7 % (ref 4–12)
NEUTROPHILS # BLD AUTO: 3.39 THOUSANDS/ΜL (ref 1.85–7.62)
NEUTS SEG NFR BLD AUTO: 54 % (ref 43–75)
NRBC BLD AUTO-RTO: 0 /100 WBCS
PLATELET # BLD AUTO: 181 THOUSANDS/UL (ref 149–390)
PMV BLD AUTO: 9.3 FL (ref 8.9–12.7)
POTASSIUM SERPL-SCNC: 4.4 MMOL/L (ref 3.5–5.3)
RBC # BLD AUTO: 3.55 MILLION/UL (ref 3.81–5.12)
SODIUM SERPL-SCNC: 136 MMOL/L (ref 136–145)
WBC # BLD AUTO: 6.25 THOUSAND/UL (ref 4.31–10.16)

## 2019-01-31 PROCEDURE — 97110 THERAPEUTIC EXERCISES: CPT

## 2019-01-31 PROCEDURE — 97116 GAIT TRAINING THERAPY: CPT

## 2019-01-31 PROCEDURE — 80048 BASIC METABOLIC PNL TOTAL CA: CPT | Performed by: INTERNAL MEDICINE

## 2019-01-31 PROCEDURE — 99232 SBSQ HOSP IP/OBS MODERATE 35: CPT | Performed by: PHYSICAL MEDICINE & REHABILITATION

## 2019-01-31 PROCEDURE — 97535 SELF CARE MNGMENT TRAINING: CPT

## 2019-01-31 PROCEDURE — 97530 THERAPEUTIC ACTIVITIES: CPT

## 2019-01-31 PROCEDURE — 85025 COMPLETE CBC W/AUTO DIFF WBC: CPT | Performed by: NURSE PRACTITIONER

## 2019-01-31 PROCEDURE — 99232 SBSQ HOSP IP/OBS MODERATE 35: CPT | Performed by: INTERNAL MEDICINE

## 2019-01-31 RX ADMIN — SERTRALINE HYDROCHLORIDE 25 MG: 25 TABLET ORAL at 21:09

## 2019-01-31 RX ADMIN — Medication 1000 MG: at 08:59

## 2019-01-31 RX ADMIN — ENOXAPARIN SODIUM 40 MG: 40 INJECTION SUBCUTANEOUS at 09:02

## 2019-01-31 RX ADMIN — OXYCODONE HYDROCHLORIDE 2.5 MG: 5 TABLET ORAL at 09:43

## 2019-01-31 RX ADMIN — METHOCARBAMOL 500 MG: 500 TABLET, FILM COATED ORAL at 06:07

## 2019-01-31 RX ADMIN — ACETAMINOPHEN 975 MG: 325 TABLET, FILM COATED ORAL at 06:06

## 2019-01-31 RX ADMIN — DOCUSATE SODIUM 100 MG: 100 CAPSULE, LIQUID FILLED ORAL at 09:00

## 2019-01-31 RX ADMIN — HYDROXYCHLOROQUINE SULFATE 200 MG: 200 TABLET, FILM COATED ORAL at 08:59

## 2019-01-31 RX ADMIN — GABAPENTIN 500 MG: 400 CAPSULE ORAL at 09:00

## 2019-01-31 RX ADMIN — LEVOTHYROXINE SODIUM 112 MCG: 112 TABLET ORAL at 06:07

## 2019-01-31 RX ADMIN — METOPROLOL TARTRATE 25 MG: 25 TABLET, FILM COATED ORAL at 21:09

## 2019-01-31 RX ADMIN — METOPROLOL TARTRATE 25 MG: 25 TABLET, FILM COATED ORAL at 09:00

## 2019-01-31 RX ADMIN — DOCUSATE SODIUM 100 MG: 100 CAPSULE, LIQUID FILLED ORAL at 17:13

## 2019-01-31 RX ADMIN — NYSTATIN: 100000 POWDER TOPICAL at 17:15

## 2019-01-31 RX ADMIN — ACETAMINOPHEN 975 MG: 325 TABLET, FILM COATED ORAL at 21:08

## 2019-01-31 RX ADMIN — STANDARDIZED SENNA CONCENTRATE 8.6 MG: 8.6 TABLET ORAL at 21:08

## 2019-01-31 RX ADMIN — CALCIUM CARBONATE 500 MG (1,250 MG)-VITAMIN D3 200 UNIT TABLET 1 TABLET: at 15:46

## 2019-01-31 RX ADMIN — GABAPENTIN 500 MG: 400 CAPSULE ORAL at 17:13

## 2019-01-31 RX ADMIN — NYSTATIN: 100000 POWDER TOPICAL at 09:03

## 2019-01-31 RX ADMIN — ACETAMINOPHEN 975 MG: 325 TABLET, FILM COATED ORAL at 13:16

## 2019-01-31 RX ADMIN — AMLODIPINE BESYLATE 5 MG: 5 TABLET ORAL at 09:01

## 2019-01-31 RX ADMIN — LIDOCAINE 1 PATCH: 50 PATCH CUTANEOUS at 09:02

## 2019-01-31 RX ADMIN — CALCIUM CARBONATE 500 MG (1,250 MG)-VITAMIN D3 200 UNIT TABLET 1 TABLET: at 08:59

## 2019-01-31 RX ADMIN — CLOPIDOGREL BISULFATE 75 MG: 75 TABLET ORAL at 09:01

## 2019-01-31 RX ADMIN — OXYCODONE HYDROCHLORIDE 2.5 MG: 5 TABLET ORAL at 15:45

## 2019-01-31 RX ADMIN — METHOCARBAMOL 500 MG: 500 TABLET, FILM COATED ORAL at 13:17

## 2019-01-31 RX ADMIN — METHOCARBAMOL 500 MG: 500 TABLET, FILM COATED ORAL at 21:08

## 2019-01-31 RX ADMIN — LIDOCAINE 1 PATCH: 50 PATCH CUTANEOUS at 09:01

## 2019-01-31 RX ADMIN — FUROSEMIDE 20 MG: 20 TABLET ORAL at 09:01

## 2019-01-31 NOTE — SOCIAL WORK
Received call back from amisha kimble at [facility] approving additional week with lcd 2/5 and confirmed dc 2/6  dtr aware and would like pt to be d/c'd 2/7 if possible due to her work schedule  Cm to review with physician

## 2019-01-31 NOTE — PROGRESS NOTES
01/31/19 0930   Pain Assessment   Pain Assessment 0-10   Pain Score 7   Pain Type Acute pain   Pain Location Neck;Head   Hospital Pain Intervention(s) Medication (See MAR); Repositioned; Rest   Restrictions/Precautions   Precautions Bed/chair alarms; Fall Risk;Spinal precautions;Supervision on toilet/commode   Braces or Orthoses C/S Collar   Cognition   Overall Cognitive Status WFL   Arousal/Participation Alert; Cooperative   Attention Within functional limits   Orientation Level Oriented X4   Following Commands Follows one step commands without difficulty   Subjective   Subjective Agreeable to PT; c/o posterior head/neck pain 7/10  RN notified and administered pain meds  Pt reporting dec in pain by end of session  QI: Sit to Stand   Assistance Needed Supervision   Sit to Stand CARE Score 4   QI: Chair/Bed-to-Chair Transfer   Assistance Needed Supervision; Adaptive equipment   Comment RW   Chair/Bed-to-Chair Transfer CARE Score 4   Transfer Bed/Chair/Wheelchair   Limitations Noted In Balance;Confidence; Endurance;UE Strength;LE Strength   Adaptive Equipment Roller Walker   Stand Pivot Supervision   Sit to Stand Supervision   Stand to Gabriel Thomas time to complete transfers   Bed, Chair, Wheelchair Transfer (FIM) 5 - Patient requires supervision/monitoring   QI: Walk 10 Feet   Assistance Needed Supervision; Adaptive equipment   Comment RW   Walk 10 Feet CARE Score 4   QI: Walk 50 Feet with Two Turns   Assistance Needed Supervision; Adaptive equipment   Comment RW   Walk 50 Feet with Two Turns CARE Score 4   QI: Walk 150 Feet   Assistance Needed Supervision; Adaptive equipment   Comment RW   Walk 150 Feet CARE Score 4   Ambulation   Does the patient walk? 2  Yes   Primary Discharge Mode of Locomotion Walk   Walk Assist Level Close Supervision   Gait Pattern Inconsistant Jenny; Slow Jenny;Decreased foot clearance; Improper weight shift   Assist Device Lisa Fang Walked (feet) 150 ft Limitations Noted In Balance; Endurance; Heel Strike; Sequencing;Speed;Strength;Swing   Findings 150ft x3   Walking (FIM) 5 - Patient requires supervision/monitoring AND distance 150 feet or more, no rest   Wheelchair mobility   QI: Does the patient use a wheelchair? 0  No   QI: 4 Steps   Assistance Needed Supervision; Adaptive equipment   Comment Bilat HR, 6 steps   4 Steps CARE Score 4   Stairs   Type Stairs   # of Steps 6   Assist Devices Bilateral Rail   Findings Nonreciprocal pattern, inc time needed to complete  Pt with c/o R knee discomfort while descending steps   Stairs (FIM) 2 - Patient goes up and down 4 - 11 stairs regardless of assist/device/setup   Therapeutic Interventions   Strengthening Seated bilat LE TE 20x each: LAQ, hip flex, hip abd with green theraband, hamstring curls green theraband   Assessment   Treatment Assessment Pt participated in PT session with focus on LE strengthening and mobility with RW  Pt completed all mobility at supervision level today with no episodes of LOB or instability noted  Inc time required to complete mobility due to pain  Pt also remains limited by dec strength and endurance  Pt demonstrating good insight into residual deficits/limitations  She will cont to benefit from PT to further improve strength, endurance and indep and safety with all mobility  Family/Caregiver Present NO   Problem List Decreased strength;Decreased range of motion;Decreased endurance; Impaired balance;Decreased mobility;Orthopedic restrictions;Pain   PT Barriers   Physical Impairment Decreased strength;Decreased range of motion;Decreased endurance; Impaired balance;Decreased mobility;Orthopedic restrictions;Pain   Functional Limitation Car transfers; Ramp negotiation;Stair negotiation;Standing;Transfers; Walking   Plan   Treatment/Interventions Functional transfer training;LE strengthening/ROM; Elevations; Therapeutic exercise; Endurance training;Patient/family training;Bed mobility;Gait training   Progress Progressing toward goals   Recommendation   Recommendation Home with family support; Outpatient PT   PT Therapy Minutes   PT Time In 0930   PT Time Out 1030   PT Total Time (minutes) 60   PT Mode of treatment - Individual (minutes) 60   PT Mode of treatment - Concurrent (minutes) 0   PT Mode of treatment - Group (minutes) 0   PT Mode of treatment - Co-treat (minutes) 0   PT Mode of Teatment - Total time(minutes) 60 minutes   Therapy Time missed   Time missed?  No

## 2019-01-31 NOTE — PROGRESS NOTES
Physical Medicine and Rehabilitation Progress Note  Qian Jeffers 80 y o  female MRN: [id number]  Unit/Bed#: -01 Encounter: [id number]    HPI: Sathish Gregory a 80 y  o  female who presented to the [facility] as a trauma following a fall on Plavix  She utilizes a SPC at baseline, and had hit a wet patch on the floor causing the cane to slip from under her  Her PMH is significant for HTN with history of CVA, AVR, HTN, and T2DM  CT C-Spine revealed a Type 3 Odontoid fracture extending to the R and L superior articular facets and L transverse foramen  She also had an acute non-displaced C6 fracture  Treated non-op  Course c/b by fluid overload, hospital delirium, and  Pain  These are improving  She was evaluated by PT/OT and determined to be appropriate for discharge to the [facility] on 1/17/19  Chief Complaint: Pain has improved  Interval: Patient s/e today at therapy and in recliner  Discussed with OT who feels that the patient is tolerating therapy well, with improvement in overall discomfort/tenderness  She reports having a BM this morning, and generally being fairly regular  No issues with bladder  She denies new weakness/numbness/tingling  Robaxin decreased to TID  Using oxycodone only twice a day  Review of her old records indicates her stroke primarily affected her left side  ROS:  Negative except for what is noted in HPI/CC/Sbuj    Assessment/Plan:      * Ambulatory dysfunction   Assessment & Plan    Patient will participate in a comprehensive inpatient rehab program with 3-5 hours a day 5-7 days a week of PT/OT  To evaluate for any further SLP needs  - Continue pain management  - Fall precautions     C6 cervical fracture (HCC)   Assessment & Plan    Non-op management  - Pain control as noted above in Type III odontoid fracture  - PT/OT     Type III fracture of odontoid process [facility])   Assessment & Plan    Please see ambulatory dysfunction    Follow-up X-rays x2 1/28: some increased anterior displacement compared with 1/11 films  Neuro exam stable   - C-Collar ATC   - Cervical precautions  - Discussed with Neurosurgery Initial films on 1/28 were with patient bent forward, exaggerating displacement  Repeat XR done appeared stable  - Plan for follow-up with Neurosurgery 2 weeks with another XR of cervical spine    - Messaged findings on exam (UMN) to Neurosurgery, but anticipate that given the rest of her exam stability, may wait to get further imaging  Lesion of left lung   Assessment & Plan    CT C/A/P - 1 cm groundglass attenuation density in the right lower lobe  Suggest reassessment with repeat chest CT in 3-6 months  Pain   Assessment & Plan    Improving neck and associated myofascial pain improving  - Continue scheduled APAP  - Adjust Robaxin 500mg TID for muscle spasms but limit impact on sleep with QID dosing  - Gabapentin 500mg BID scheduled for adjuvant  Pain control and chronic Fibro  Can increase if needed to up to 700mg BID    - Continue oxycodone 2 5mg to Q6hr PRN - patient titrating down appropriately  - Lidocaine patch     Hyponatremia   Assessment & Plan    Resolved  - Patient continuing Fluid restriction   - Sodium tabs discontinued  - Resolved with most recent Na on 1/31/18 136 < 135 <137  Imaging per prior providers:  - CTCAP to rule out other causes - unremarkable except for possible pulmonary nodule  - CTH unremarkable except for old, stable lacunar infarct  - Remainder of work-up unrevealing  Nephro recs appreciated  History of aortic valve replacement with bioprosthetic valve   Assessment & Plan    Monitor cardiopulmonary status  IM to manage     Fibromyalgia   Assessment & Plan    Continue sertraline  Continue gabapentin to 500mg BID  Consult neuropsychology for adjustment and coping skills for pain management       Rheumatoid arthritis involving multiple sites [facility])   Assessment & Plan    Continue plaquenil, and pain mgmt as outlined below  - Monitor for flare  - IM following     Stage 3 chronic kidney disease ([address] )   Assessment & Plan    Had an episode of HERLINDA  Now resolved with stable creatinine at 1 18    - Baseline crea ~ 1    - Nephro following, recs appreciated  Continue to monitor function on Lasix  - Renally dosed medications  - Avoiding nephrotoxic medications  Depression   Assessment & Plan    Continue sertraline  Type 2 diabetes mellitus with diabetic polyneuropathy [facility])   Assessment & Plan    Lab Results   Component Value Date    HGBA1C 5 2 01/18/2019       No results for input(s): POCGLU in the last 72 hours  Blood Sugar Average: Last 72 hrs:  - Monitor fasting glucose  - Continue diabetic diet  - At home diet controlled  - Placed on diabetic diet  - IM to manage     History of CVA (cerebrovascular accident)   Assessment & Plan    Old R BG stroke appreciated on recent CT head   Cannot tolerate statins  Continue Plavix  Continue fish oil  At home on red yeast rice as well  Chronic diastolic heart failure (HCC)   Assessment & Plan    Continue lasix 20mg qday per nephro/IM  Monitor kidney function on lasix   Continue ARB/BB     Renovascular hypertension   Assessment & Plan    Mgmt per IM/Nephro  Amlodipine 5mg  Goal <130/80 but not by much per Nephrology  Losartan 50mg  Continue metoprolol tartrate 25mg Q12  Continue Lasix 20mg daily  S/p stenting for DONAVAN         # Skin  · Encourage regular turning as patient at risk for skin breakdown  · Staff to continue patient education on Q2h turning     # Bowel  · Patient reports no constipation  · last BM 1/31/19  Continu regimen of Colace, senna, and miralax       # Bladder  · Patient voiding spontaneously    # Pain  · Continue tylenol, for max of 3gm daily  · Continue oxycodone   · Continue methocarbamol  · Continue gabapentin  · Robaxin TID, Gabapentin increased to 500mg BID      # Rehab Psych   · referral to Rehabilitation Psychology    # Other  - Diet/Nutrition:        Diet Orders            Start     Ordered    01/24/19 1248  Diet Regular; Regular House; Fluid Restriction 1200 ML  Diet effective now     Question Answer Comment   Diet Type Regular    Regular Regular House    Other Restriction(s): Fluid Restriction 1200 ML    RD to adjust diet per protocol? Yes        01/24/19 1247    01/18/19 0721  Room Service  Once     Question:  Type of Service  Answer:  Room Service - Appropriate with Assistance    01/18/19 0720    01/17/19 1654  Dietary nutrition supplements  Once     Question Answer Comment   Select Supplement: Ensure Enlive-Vanilla    Frequency Lunch        01/17/19 1653        - DVT prophy: Sequential compression device (Venodyne)  and Enoxaparin (Lovenox)  - GI ppx: None  - Nausea: None  - Supplements: None  - Sleep: None    Disposition: Team 1/30/19 Making slow progress  Has decrease tolerance/endurance, UE strength/ROM, memory  Goal is to get patient to a Supervision level with ADLs/transfers/mobility with LRAD  She is making progress  Plan to reteam on 2/6/19       CODE: Level 1: Full Code   Scheduled Meds:    Current Facility-Administered Medications:  acetaminophen 975 mg Oral [facility] Maryjane Gurrola MD   albuterol 2 puff Inhalation Q4H PRN Maryjane Gurrola MD   amLODIPine 5 mg Oral Daily Matt Barry MD   barium sulfate 450 mL Oral Once in imaging Matt Barry MD   calcium carbonate-vitamin D 1 tablet Oral BID With Meals Maryjane Gurrola MD   clopidogrel 75 mg Oral Daily Maryjane Gurrola MD   docusate sodium 100 mg Oral BID Maryjane Gurrola MD   enoxaparin 40 mg Subcutaneous Q24H [address] Maryjane Gurrola MD   fish oil 1,000 mg Oral Daily Maryjane Gurrola MD   furosemide 20 mg Oral Daily Chad Li MD   gabapentin 500 mg Oral BID Maryjane Gurrola MD   hydroxychloroquine 200 mg Oral Daily With Breakfast Maryjane Gurrola MD   levothyroxine 112 mcg Oral Early Morning Maryjane Gurrola MD   lidocaine 1 patch Topical Daily Maryjane Gurrola MD lidocaine 1 patch Topical Daily Hammad Og MD   losartan 50 mg Oral HS Ladonna Muir MD   methocarbamol 500 mg Oral TID Ivy Arreola MD   metoprolol tartrate 25 mg Oral Q12H [facility] Hammad Og MD   nystatin  Topical BID Hammad Og MD   oxyCODONE 2 5 mg Oral Q6H PRN Hammad Og MD   polyethylene glycol 17 g Oral Daily PRN Hammad Og MD   senna 1 tablet Oral HS Hammad Og MD   sertraline 25 mg Oral Daily Hammad Og MD   trolamine salicylate 1 application Topical 4x Daily PRN Ewelina Perales PA-C        Objective:    Functional Update:   1/30/19 OT: S eating, Deondre grooming, UB/LB dressing  modA bathing, Deondre toileting, modA tub/shower transfers, Deondre toilet transfers  1/30/19 PT: modA transfers, Ambulates 25' CGA-Deondre with RW, 1 stair with RW modA, Deondre with ramp  Allergies per EMR    Physical Exam:  Temp:  [97 7 °F (36 5 °C)-97 9 °F (36 6 °C)] 97 9 °F (36 6 °C)  HR:  [56-60] 60  Resp:  [16-18] 16  BP: (134-158)/(60-68) 142/68  SpO2:  [97 %] 97 %    General: alert, no apparent distress, cooperative and comfortable  HEENT:  Head: Normocephalic, no lesions, without obvious abnormality  C-collar in place - slightly big, but appropriate restriction  CARDIAC:  regular rate and rhythm, S1, S2 normal, no murmur, click, rub or gallop  LUNGS:  normal air entry, lungs clear to auscultation  ABDOMEN:  soft, non-tender  Bowel sounds normal  No masses, no organomegaly  EXTREMITIES:  BL LE edema, stable  NEURO:   normal without focal findings, mental status, speech normal, alert and oriented x3 and See mmt below  Also reflexes are 2+ symmetric throughout  Azael present on the right, as well as plantar response extensor on the right   L side is normal    PSYCH:  Normal  and Alert and oriented, appropriate affect     INTEG/SKIN: Two areas of raw, macerated skin on her bilateral labia, seen best posteriorly, with evidence of moisture associated dermatitis through her labia, groin, and intergluteal region  MSK:  See MMT below  Hip ROM seated limited due to body habitus  MMT:   Strength:   Right  Left  Site  Right  Left  Site    5 5  S Ab: Shoulder Abductors   3 3  HF: Hip Flexors    5 5  EF: Elbow Flexors  5  5 KF: Knee Flexors    5  5  EE: Elbow Extensors  5  5  KE: Knee Extensors    5  5  WE: Wrist Extensors  5  5  DR: Dorsi Flexors    5  5  FF: Finger Flexors  5  5  PF: Plantar Flexors    5  5  HI: Hand Intrinsics  5  5  EHL: Extensor Hallucis Longus          Diagnostic Studies: Reviewed  XR follow up   Final Result by Kendra Phillip MD (01/29 1848)      Anterior displacement at the odontoid fracture is unchanged from exam earlier in the same day, but increased from January 11  Unchanged alignment at C6 anterior syndesmophyte fracture  Workstation performed: [id number]         XR spine cervical 2 or 3 vw injury   Final Result by Daisy Kent MD (01/28 1142)      Increased anterior displacement of odontoid fracture  Workstation performed: [id number]         CT chest abdomen pelvis wo contrast   Final Result by Leonardo Aj MD (01/25 1954)      No suspicious appearing masses are seen  There is an approximately 1 cm groundglass attenuation density in the right lower lobe  Suggest reassessment with repeat chest CT in 3-6 months  Relatively mild colonic diverticulosis  Workstation performed: [id number]         CT head wo contrast   Final Result by Leonardo Aj MD (01/25 1943)      No acute intracranial abnormality  Moderate chronic microvascular ischemic change in periventricular white matter and cerebral atrophy  Workstation performed: [id number]         XR chest pa & lateral   Final Result by Geri Abebe DO (01/25 5299)   No acute cardiopulmonary disease              Workstation performed: [id number]             Laboratory: Reviewed    Results from last 7 days  Lab Units 01/31/19  0608 01/28/19  0526 HEMOGLOBIN g/dL 11 2* 10 8*   HEMATOCRIT % 35 7 34 2*   WBC Thousand/uL 6 25 5 85       Results from last 7 days  Lab Units 01/31/19  0608 01/30/19  0620 01/29/19  0524   BUN mg/dL 38* 40* 41*   SODIUM mmol/L 136 135* 137   POTASSIUM mmol/L 4 4 4 5 4 8   CHLORIDE mmol/L 101 101 102   CREATININE mg/dL 1 18 1 18 1 18            Patient Active Problem List   Diagnosis    Closed nondisplaced fracture of second cervical vertebra (HCC)    Neck pain, acute    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)    C6 cervical fracture (HCC)    Hypertension    Hypothyroidism    Fall    Forgetfulness    Ambulatory dysfunction    Physical deconditioning    Acute pain    Delirium    Renovascular hypertension    Chronic diastolic heart failure (HCC)    History of CVA (cerebrovascular accident)    Type 2 diabetes mellitus with diabetic polyneuropathy (HCC)    Depression    Acute renal failure superimposed on chronic kidney disease (HCC)    Rheumatoid arthritis involving multiple sites ([address] )    Fibromyalgia    History of aortic valve replacement with bioprosthetic valve    Hyponatremia    Renal artery stenosis (HCC)    Parenchymal renal hypertension    Pain    Lesion of left lung       ** Please Note: Fluency Direct voice to text software may have been used in the creation of this document  **    Total visit time:  At least 25 minutes, with more than 50% spent counseling/coordinating care

## 2019-01-31 NOTE — PROGRESS NOTES
01/31/19 1300   Pain Assessment   Pain Assessment 0-10   Pain Score 3   Pain Type Acute pain   Pain Location Head   Pain Orientation Frontal   Restrictions/Precautions   Precautions Bed/chair alarms; Fall Risk;Spinal precautions;Supervision on toilet/commode   Braces or Orthoses C/S Collar   Subjective   Subjective Pt  was sleep when entering room and agreeable to therapy   QI: Sit to Stand   Assistance Needed Supervision; Adaptive equipment   Sit to Stand CARE Score 4   QI: Chair/Bed-to-Chair Transfer   Assistance Needed Supervision; Adaptive equipment   Chair/Bed-to-Chair Transfer CARE Score 4   Transfer Bed/Chair/Wheelchair   Limitations Noted In Coordination; Endurance;LE Strength;Balance   Adaptive Equipment Roller Walker   Stand Pivot Supervision   Sit to Stand Supervision   Stand to SAMANTHA Christenseney, Chair, Wheelchair Transfer (FIM) 5 - Patient requires supervision/monitoring   QI: Walk 10 Feet   Assistance Needed Supervision; Adaptive equipment   Walk 10 Feet CARE Score 4   QI: Walk 50 Feet with Two Turns   Assistance Needed Supervision; Adaptive equipment   Walk 50 Feet with Two Turns CARE Score 4   QI: Walking 10 Feet on Uneven Surfaces   Reason if not Attempted Safety concerns   Walking 10 Feet on Uneven Surfaces CARE Score 88   Ambulation   Does the patient walk? 2  Yes   Primary Discharge Mode of Locomotion Walk   Walk Assist Level Supervision   Gait Pattern Inconsistant Jenny; Slow Jenny; Improper weight shift;Decreased foot clearance   Assist Device Roller Walker   Distance Walked (feet) 150 ft   Limitations Noted In Balance; Coordination; Endurance; Heel Strike;Strength;Speed   Findings pt amb with decrease step length and decrease gait speeds    Walking (FIM) 5 - Patient requires supervision/monitoring AND distance 150 feet or more, no rest   Wheelchair mobility   QI: Does the patient use a wheelchair? 0   No   QI: Toilet Transfer   Assistance Needed Adaptive equipment;Supervision   Toilet Transfer CARE Score 4   Toilet Transfer   Surface Assessed Standard Toilet   Limitations Noted In Balance; Endurance;LE Strength   Adaptive Equipment Grab Bar   Findings pt had to use BR at start of session, nursing entered and performed medication application    Toilet Transfer (FIM) 5 - Patient requires supervision/monitoring   Equipment Use   NuStep 10 min LEs only    Assessment   Treatment Assessment session focused on functional mobility and endurance with amb  pt with slow gait speed but does not demonstrate decrease balance or safety at this time  cont to work on LE strength, endurance and fuctional mobility to decrease fall risk prior to d/c next week  Problem List Decreased strength;Decreased endurance; Impaired balance;Decreased mobility; Decreased coordination;Orthopedic restrictions   Barriers to Discharge Inaccessible home environment   PT Barriers   Physical Impairment Decreased strength;Decreased range of motion;Decreased endurance; Impaired balance;Decreased mobility;Orthopedic restrictions;Pain   Functional Limitation Car transfers;Stair negotiation;Standing;Transfers; Walking   Plan   Treatment/Interventions Functional transfer training;LE strengthening/ROM; Therapeutic exercise; Endurance training;Bed mobility;Gait training   Progress Progressing toward goals   Recommendation   Recommendation Outpatient PT; Home with family support   PT Therapy Minutes   PT Time In 1300   PT Time Out 1330   PT Total Time (minutes) 30   PT Mode of treatment - Individual (minutes) 30   PT Mode of treatment - Concurrent (minutes) 0   PT Mode of treatment - Group (minutes) 0   PT Mode of treatment - Co-treat (minutes) 0   PT Mode of Teatment - Total time(minutes) 30 minutes   Therapy Time missed   Time missed?  No

## 2019-01-31 NOTE — PROGRESS NOTES
01/31/19 0700   Pain Assessment   Pain Assessment 0-10   Pain Score 2   Pain Type Acute pain   Hospital Pain Intervention(s) Distraction; Emotional support; Environmental changes   Restrictions/Precautions   Precautions Bed/chair alarms;Spinal precautions;Supervision on toilet/commode; Fall Risk   Weight Bearing Restrictions No   ROM Restrictions Yes   Braces or Orthoses C/S Collar   QI: Eating   Assistance Needed Set-up / [address] Provided by [place] No physical assistance   Eating CARE Score 5   Eating Assessment   Eating (FIM) 5 - Patient needs help to open contianers or set up tray   Grooming   Able To Comb/Brush Hair;Wash/Dry Face;Brush/Clean Teeth;Wash/Dry Hands; Initiate Tasks   Limitation Noted In Coordination; Safety;Strength;Timeliness   Grooming (FIM) 4 - Patient requires steadying assist or light touching   QI: Shower/Bathe Self   Assistance Needed Incidental touching   Assistance Provided by [place] Less than 25%   Comment Pt has demonstrated ability to complete rear/loreto hygiene while in stance at sink using flossing method  2* to decreased skin integrity pt educated on patting area clean to reduce shearing force as pt presents with skin tears on labia  Pt uses LHR to wash BLEs with G carry over of technique  Shower/Bathe Self CARE Score 3   Bathing   Assessed Bath Style Sponge Bath   Anticipated D/C Bath Style Shower   Able to [place] No   Able to [employer] Temperature Other  (Not assessed)   Able to Wash/Rinse/Dry (body part) Left Arm;Right Arm;L Upper Leg;R Upper Leg;L Lower Leg/Foot;R Lower Leg/Foot;Chest;Abdomen;Perineal Area; Buttocks   Positioning Seated;Standing   Adaptive Equipment Longhand Reacher   Bathing (FIM) 4 - Patient requires steadying assist or light touching   Tub/Shower Transfer   Not Assessed Sponge Bath  (Timeliness)   QI: Upper Body Dressing   Assistance Needed Verbal cues; Incidental touching   Assistance Provided by [place] No physical assistance   Comment Pt able to don OH tank top with overall CS and requires dressing stick to don button down shirt to pass shirt around back 2* orthopedic restrictions and decreased UE ROM  Pt requires increased time for task completion  Upper Body Dressing CARE Score 4   QI: Lower Body Dressing   Assistance Needed Incidental touching   Assistance Provided by [place] No physical assistance   Comment Pt demos increased ability to complete LB dressing with use of LHAE  Pt is able to thread BLEs into pants with use of reacher with increased time  Pt also demtaisha increased independence with pulling pants up to knees from seated position which increases her ability to complete CM up over hips while in stance  Pt continues to require steadying A for maintaining balance, increased time, and VC's for safety  Lower Body Dressing CARE Score 4   QI: Putting On/Taking Off Footwear   Assistance Needed Physical assistance   Assistance Provided by [place] Less than 25%   Comment Pt's family brought in home sock aid for Pt to try  Pt francesca difficulty getting sock on to free standing sock aid and getting her leg up on the bed which is the method she reports completing at home  Pt unable to get sock on using this sock aid due to orthopedic restrictions as this sock aide does not have any ropes  Pt shown standard sock aid pt requires VC's/A for pulling sock on sock aid 2* to decreased hand/ strength  Pt reports that she wants to use free standing sock aid at home and supervising SHAWN/L educates pt on concerns with sock aide she is currently using and made recommendation for standard one to increase independence, safety, and for ECTs  Pt receptive to education with increased education  Putting On/Taking Off Footwear CARE Score 3   Dressing/Undressing Clothing   Remove UB Clothes Undershirt; Other  [facility] gown)   Remove LB Clothes Socks  (Brief)   Don UB Clothes Button Shirt; [address]; Undergarment;Socks   Limitations Noted In Balance; Endurance;Strength;ROM; Timeliness   Adaptive Equipment Reacher;Dressing Stick; Sock Aide;LH [employer]; Sit Edge Of Bed;Standing   Findings Pt uses dressing stick to remove socks  Pt then uses reacher to don brief and pants  Pt uses dressing stick to pull button shirt around her back to thread left arm  Pt uses sock aid to don socks and LH shoehorn to don shoes with assistance to tie shoe laces   UB Dressing (FIM) 5 - Patient requires supervision/monitoring   LB Dressing (FIM) 3 - Patient completes  50-74% of all tasks   QI: Sit to Stand   Assistance Needed Incidental touching   Assistance Provided by [place] Less than 25%   Comment Pt did not require anyt boosting during this session, continues to require CGA 2* to decreased standing balance  Sit to Stand CARE Score 3   QI: Chair/Bed-to-Chair Transfer   Assistance Needed Incidental touching   Assistance Provided by [place] Less than 25%   Comment RW   Chair/Bed-to-Chair Transfer CARE Score 3   Transfer Bed/Chair/Wheelchair   Limitations Noted In Balance; Endurance;UE Strength;LE Strength   Adaptive Equipment Roller Walker   Sit to TXU [place]   Stand to W  R  [place], Chair, Wheelchair Transfer (FIM) 4 - Patient requires steadying assist or light touching   QI: [address] Needed Physical assistance   Assistance Provided by [place] 75% or more   Comment Pt able to complete CM down over hips but requires assistance for hygiene and pants up over hips  Toileting Hygiene CARE Score 2   Toileting   Able to Pull Clothing down yes, up no  Able to Manage Clothing Closures No   Manage Hygiene Bladder; Bowel   Limitations Noted In Balance;ROM;LE Strength;UE Strength; Safety   Adaptive Equipment Grab Bar   Toileting (FIM) 2 - Patient completes 25-49% of all tasks   QI: Toilet Transfer   Assistance Needed Incidental touching   Assistance Provided by [place] No physical assistance   Toilet Transfer CARE Score 4   Toilet Transfer   Surface Assessed Raised Toilet   Transfer Technique Standard   Limitations Noted In Balance; Endurance;ROM;LE Strength;UE Strength   Adaptive Equipment Grab Bar   Findings Pt does not require any boosting during this session   Toilet Transfer (FIM) 4 - Patient requires steadying assist or light touching   Cognition   Overall Cognitive Status Impaired   Arousal/Participation Alert; Cooperative   Attention Attends with cues to redirect   Orientation Level Oriented X4   Memory Decreased short term memory   Following Commands Follows one step commands without difficulty   Activity Tolerance   Activity Tolerance Patient tolerated treatment well   Assessment   Treatment Assessment Pt participated in skilled OT services to focus on ADL retraining  See above for details on ADL performance  Pt demos F insight into her deficits after supervising ESCOBAR/L educated client on different long rope handled sock aid to promote ECT  Pt demos improved standing tolerance when standing at sink to complete rear and loreto hygiene  Pt demos P safety awareness while sitting on the edge of bed and needs VC's to sit back further to prevent fall  Pt seen by nurse Avani Handley from wound care to further assess wound on labia  Hydroguard lotion and ABD pad to be applied to area between brief to promote wound healing  Pt reports it feeling better after cream and pad are applied  Pt shows overall F progress towards LTGs but continues to be limited by decreased shoulder ROM, decreased endurance, and decreased general strength which limits her ability to complete I/ADL tasks independently  Pt would benefit from skilled OT services to focus on family training, increased endurance, increased shoulder ROM, and increased general strength and compensatory strategies for loreto/rear hygiene  Prognosis Fair   Problem List Decreased strength;Decreased range of motion;Decreased endurance; Impaired balance;Decreased mobility; Decreased cognition;Decreased safety awareness;Decreased skin integrity;Orthopedic restrictions;Pain   Plan   Treatment/Interventions ADL retraining;Functional transfer training; Therapeutic exercise; Endurance training;Patient/family training   Progress Progressing toward goals   Recommendation   OT Discharge Recommendation Home OT   OT Therapy Minutes   OT Time In 0700   OT Time Out 0830   OT Total Time (minutes) 90   OT Mode of treatment - Individual (minutes) 90   OT Mode of treatment - Concurrent (minutes) 0   OT Mode of treatment - Group (minutes) 0   OT Mode of treatment - Co-treat (minutes) 0   OT Mode of Teatment - Total time(minutes) 90 minutes   Therapy Time missed   Time missed?  No

## 2019-01-31 NOTE — PROGRESS NOTES
NEPHROLOGY PROGRESS NOTE   Patrica Scott 80 y o  female MRN: [id number]  Unit/Bed#: -01 Encounter: [id number]  Reason for Consult: HERLINDA    ASSESSMENT and PLAN:    1 ) Hyponatremia --> resolved  -sodium level stable at 136  -sodium has improved and nice and correct rate with no evidence of over-correction  -off sodium chloride tablets  -currently on a fluid restriction, 1 2 L per day  -currently on furosemide 20 mg daily  -history of hypovolemia/SIADH  -no further recommendations   -continue current management     2 ) Acute kidney injury --> resolved  -renal function back to baseline  -continue furosemide  -creatinine stable at 1 18 today     3 ) Chronic kidney disease stage II/III  -baseline creatinine is around 1 mg/dL     4 ) Hypertension  -blood pressure acceptable  -continue Lasix 20 mg    All acute renal issues have resolved and are currently stable  Can follow up as an outpatient if needed  No further recommendations  We will sign off at this time call for any questions or concerns  Thank you      SUBJECTIVE / INTERVAL HISTORY:    No overnight events    OBJECTIVE:  Current Weight: Weight - Scale: 84 1 kg (185 lb 6 5 oz)  Vitals:    01/30/19 0612 01/30/19 1255 01/30/19 1953 01/31/19 0603   BP: 152/66 134/68 158/60 142/68   BP Location: Left arm Left arm Left arm Left arm   Pulse: 60 56 58 60   Resp: 16 18 16 16   Temp: 97 6 °F (36 4 °C) 97 7 °F (36 5 °C) 97 7 °F (36 5 °C) 97 9 °F (36 6 °C)   TempSrc: Oral Oral Oral Oral   SpO2: 98% 97% 97% 97%   Weight:       Height:           Intake/Output Summary (Last 24 hours) at 01/31/19 0953  Last data filed at 01/31/19 0830   Gross per 24 hour   Intake              330 ml   Output                0 ml   Net              330 ml       Review of Systems:    12 point ROS has been reviewed  Physical Exam   Constitutional: She is oriented to person, place, and time  She appears well-developed and well-nourished  No distress     HENT:   Head: Normocephalic and atraumatic  Eyes: Pupils are equal, round, and reactive to light  No scleral icterus  Neck: Normal range of motion  Neck supple  Cardiovascular: Normal rate, regular rhythm and normal heart sounds  Exam reveals no gallop and no friction rub  No murmur heard  Pulmonary/Chest: Effort normal and breath sounds normal  No respiratory distress  She has no wheezes  She has no rales  She exhibits no tenderness  Abdominal: Soft  Bowel sounds are normal  She exhibits no distension  There is no tenderness  There is no rebound  Musculoskeletal: Normal range of motion  She exhibits no edema  Neurological: She is alert and oriented to person, place, and time  Skin: No rash noted  She is not diaphoretic  Psychiatric: She has a normal mood and affect  Nursing note and vitals reviewed        Medications:    Current Facility-Administered Medications:     acetaminophen (TYLENOL) tablet 975 mg, 975 mg, Oral, Q8H [facility], Lima Self MD, 975 mg at 01/31/19 0606    albuterol (PROVENTIL HFA,VENTOLIN HFA) inhaler 2 puff, 2 puff, Inhalation, Q4H PRN, Lima Self MD    amLODIPine (NORVASC) tablet 5 mg, 5 mg, Oral, Daily, Shawn Swain MD, 5 mg at 01/31/19 0901    barium sulfate 2 1 % suspension 450 mL, 450 mL, Oral, Once in imaging, Shawn Swain MD    calcium carbonate-vitamin D (OSCAL-D) 500 mg-200 units per tablet 1 tablet, 1 tablet, Oral, BID With Meals, Lima Self MD, 1 tablet at 01/31/19 0859    clopidogrel (PLAVIX) tablet 75 mg, 75 mg, Oral, Daily, Lima Self MD, 75 mg at 01/31/19 0901    docusate sodium (COLACE) capsule 100 mg, 100 mg, Oral, BID, Lima Self MD, 100 mg at 01/31/19 0900    enoxaparin (LOVENOX) subcutaneous injection 40 mg, 40 mg, Subcutaneous, Q24H [facility], Lima Self MD, 40 mg at 01/31/19 0902    fish oil capsule 1,000 mg, 1,000 mg, Oral, Daily, Lima Self MD, 1,000 mg at 01/31/19 0859    furosemide (LASIX) tablet 20 mg, 20 mg, Oral, Daily, Madelaine Ponce MD, 20 mg at 01/31/19 0901    gabapentin (NEURONTIN) capsule 500 mg, 500 mg, Oral, BID, Travis Parker MD, 500 mg at 01/31/19 0900    hydroxychloroquine (PLAQUENIL) tablet 200 mg, 200 mg, Oral, Daily With Breakfast, Travis Parker MD, 200 mg at 01/31/19 0859    levothyroxine tablet 112 mcg, 112 mcg, Oral, Early Morning, Travis Parker MD, 112 mcg at 01/31/19 0607    lidocaine (LIDODERM) 5 % patch 1 patch, 1 patch, Topical, Daily, Travis Parker MD, 1 patch at 01/31/19 0902    lidocaine (LIDODERM) 5 % patch 1 patch, 1 patch, Topical, Daily, Travis Parker MD, 1 patch at 01/31/19 0901    losartan (COZAAR) tablet 50 mg, 50 mg, Oral, HS, Dima Gould MD, 50 mg at 01/30/19 2123    methocarbamol (ROBAXIN) tablet 500 mg, 500 mg, Oral, TID, Олег Suresh MD, 500 mg at 01/31/19 0607    metoprolol tartrate (LOPRESSOR) tablet 25 mg, 25 mg, Oral, Q12H [address], Travis Parker MD, 25 mg at 01/31/19 0900    nystatin (MYCOSTATIN) powder, , Topical, BID, Travis Parker MD    oxyCODONE (ROXICODONE) IR tablet 2 5 mg, 2 5 mg, Oral, Q6H PRN, Travis Parker MD, 2 5 mg at 01/31/19 0943    polyethylene glycol (MIRALAX) packet 17 g, 17 g, Oral, Daily PRN, Travis Parker MD    [facility]) tablet 8 6 mg, 1 tablet, Oral, HS, Travis Parker MD, 8 6 mg at 01/30/19 2123    sertraline (ZOLOFT) tablet 25 mg, 25 mg, Oral, Daily, Travis Parker MD, 25 mg at 01/30/19 2123    trolamine salicylate (ASPERCREME) 10 % cream 1 application, 1 application, Topical, 4x Daily PRN, Ewelina Perales PA-C, 1 application at 34/26/85 3107    Laboratory Results:    Results from last 7 days  Lab Units 01/31/19  0608 01/30/19  0620 01/29/19  0524 01/28/19  0541 01/27/19  0510 01/26/19  0605 01/25/19  0504   WBC Thousand/uL 6 25  --   --  5 85  --   --   --    HEMOGLOBIN g/dL 11 2*  --   --  10 8*  --   --   --    HEMATOCRIT % 35 7  --   --  34 2*  --   --   --    PLATELETS Thousands/uL 181  --   --  193  --   --   --    POTASSIUM mmol/L 4 4 4 5 4 8 4 6 4 8 4 6 4 5   CHLORIDE mmol/L 101 101 102 102 100 96* 96*   CO2 mmol/L 29 29 28 28 31 25 29   BUN mg/dL 38* 40* 41* 47* 49* 51* 42*   CREATININE mg/dL 1 18 1 18 1 18 1 11 1 32* 1 36* 1 40*   CALCIUM mg/dL 9 1 9 2 9 1 8 8 9 0 8 9 9 0   MAGNESIUM mg/dL  --   --   --   --   --   --  2 6

## 2019-01-31 NOTE — PROGRESS NOTES
Internal Medicine Progress Note  Patient: Elizabeth Johnston  Age/sex: 80 y o  female  Medical Record #: [id number]      ASSESSMENT/PLAN:  Elizabeth Johnston is seen and examined and management for following issues:    Type III odontoid fracture with fracture line extending to the right/left superior articular facets and left transverse foramen of C2; nondisplaced fracture through bulky, bridging osteophytes at the C6 level:  required no surgery  Continue collar  Pt reported severe Rt facial pain on 1/19 and an afternoon dose of gabapentin was added  Facial pain she describes is intermitt and in front of ears      HTN with hx right RA stent: per renal = acceptable on Norvasc 5 mg qd, Cozaar 50mg qd, Lopressor 25mg q12hrs    Hyponatremia:  Resolved; renal is following  The NACL tabs 1 Gm BID are now stopped  Torsemide was switched back to Lasix 20 mg qd as at home; continue FR 1200  Renal ordered CXR 1/24 (negative) and CT C/A/P 1/25 negative for etiology for hyponatremia  Ground-glass attenuation RLL lower lung base: For repeat CT chest 3-6 months        CKD; baseline 1 1-1 3:  baseline      Hx bioAVR 2012: had no significant disease when had card cath before surgery     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 2016:  continue Plavix that she has been on since CVA 2005; with her TIA 2016, neuro kept Plavix; she is intol statins and uses Red yeast rice and Fish oil at home     DM diet controlled:   watching fasting blood sugars  Stable      Chronic diastolic CHF, LVEF 30%, mild-mod MR/mild TR and norm function AVR 2016:  on Lasix 20mg qd as at home  Follows with Dr Pura You  Family says LE edema is less than at home  CXR 1/24 was negative for CHF     RA/SLE; chr pain/fibromyalgia:  continue Plaquenil for RA; on Hydrocodone at home for chr pain      Hypothyroidism:  continue current Levothyroxine     Depression: Zoloft     Hx breast cancer: follows with Alyssa Rodas      Subjective:  denies any complaints      ROS:   GI: denies abdominal pain, change bowel habits or reflux symptoms  Neuro: +Right facial pain  Respiratory: No Cough, SOB  Cardiovascular: No CP, palpitations     Scheduled Meds:    Current Facility-Administered Medications:  acetaminophen 975 mg Oral [facility] Rasheed Dhillon MD   albuterol 2 puff Inhalation Q4H PRN Rasheed Dhillon MD   amLODIPine 5 mg Oral Daily Rober Collins MD   barium sulfate 450 mL Oral Once in imaging Rober Collins MD   calcium carbonate-vitamin D 1 tablet Oral BID With Meals Rasheed Dhillon MD   clopidogrel 75 mg Oral Daily Rasheed Dhillon MD   docusate sodium 100 mg Oral BID Rasheed Dhillon MD   enoxaparin 40 mg Subcutaneous Q24H [facility] Rasheed Dhillon MD   fish oil 1,000 mg Oral Daily Rasheed Dhillon MD   furosemide 20 mg Oral Daily Allen Terrazas MD   gabapentin 500 mg Oral BID Rasheed Dhillon MD   hydroxychloroquine 200 mg Oral Daily With Breakfast Rasheed Dhillon MD   levothyroxine 112 mcg Oral Early Morning Rasheed Dhillon MD   lidocaine 1 patch Topical Daily Rasheed Dhillon MD   lidocaine 1 patch Topical Daily Rasheed Dhillon MD   losartan 50 mg Oral HS Rober Collins MD   methocarbamol 500 mg Oral TID Lucinda Mathew MD   metoprolol tartrate 25 mg Oral Q12H [facility] Rasheed Dhillon MD   nystatin  Topical BID Rasheed Dhillon MD   oxyCODONE 2 5 mg Oral Q6H PRN Rasheed Dhillon MD   polyethylene glycol 17 g Oral Daily PRN Rasheed Dhillon MD   senna 1 tablet Oral HS Rasheed Dhillon MD   sertraline 25 mg Oral Daily Rasheed Dhillon MD   trolamine salicylate 1 application Topical 4x Daily PRN Ewelina Perales PA-C       Labs:       Results from last 7 days  Lab Units 01/31/19  0608 01/28/19  0541   WBC Thousand/uL 6 25 5 85   HEMOGLOBIN g/dL 11 2* 10 8*   HEMATOCRIT % 35 7 34 2*   PLATELETS Thousands/uL 181 193       Results from last 7 days  Lab Units 01/31/19  0608 01/30/19  0620   SODIUM mmol/L 136 135*   POTASSIUM mmol/L 4 4 4 5   CHLORIDE mmol/L 101 101   CO2 mmol/L 29 29   BUN mg/dL 38* 40*   CREATININE mg/dL 1 18 1 18 CALCIUM mg/dL 9 1 9 2                    [unfilled]    Labs reviewed    Physical Examination:  Vitals:   Vitals:    01/30/19 1255 01/30/19 1953 01/31/19 0603 01/31/19 1314   BP: 134/68 158/60 142/68 124/54   BP Location: Left arm Left arm Left arm Left arm   Pulse: 56 58 60 56   Resp: 18 16 16 18   Temp: 97 7 °F (36 5 °C) 97 7 °F (36 5 °C) 97 9 °F (36 6 °C) 97 6 °F (36 4 °C)   TempSrc: Oral Oral Oral Oral   SpO2: 97% 97% 97% 97%   Weight:       Height:           HEENT:  No thrush  RESP: CTAB, no R/R/W; resp unlabored  CV: +S1 S2, regular rate, no rubs/murmurs  ABD: soft, NT, ND, normal BS   EXT: edema of LEs L>R is stable  Neuro: AAO; [place] 4/5      [ X ] Total time spent: 30 Mins and greater than 50% of this time was spent counseling/coordinating care  ** Please Note: Dragon 360 Dictation voice to text software may have been used in the creation of this document   **

## 2019-02-01 PROCEDURE — 97530 THERAPEUTIC ACTIVITIES: CPT

## 2019-02-01 PROCEDURE — 99232 SBSQ HOSP IP/OBS MODERATE 35: CPT | Performed by: PHYSICAL MEDICINE & REHABILITATION

## 2019-02-01 PROCEDURE — 97116 GAIT TRAINING THERAPY: CPT

## 2019-02-01 PROCEDURE — 97535 SELF CARE MNGMENT TRAINING: CPT

## 2019-02-01 RX ADMIN — GABAPENTIN 500 MG: 400 CAPSULE ORAL at 17:11

## 2019-02-01 RX ADMIN — GABAPENTIN 500 MG: 400 CAPSULE ORAL at 09:17

## 2019-02-01 RX ADMIN — OXYCODONE HYDROCHLORIDE 2.5 MG: 5 TABLET ORAL at 17:13

## 2019-02-01 RX ADMIN — HYDROXYCHLOROQUINE SULFATE 200 MG: 200 TABLET, FILM COATED ORAL at 07:37

## 2019-02-01 RX ADMIN — NYSTATIN 1 APPLICATION: 100000 POWDER TOPICAL at 09:22

## 2019-02-01 RX ADMIN — ACETAMINOPHEN 975 MG: 325 TABLET, FILM COATED ORAL at 21:45

## 2019-02-01 RX ADMIN — SERTRALINE HYDROCHLORIDE 25 MG: 25 TABLET ORAL at 21:46

## 2019-02-01 RX ADMIN — AMLODIPINE BESYLATE 5 MG: 5 TABLET ORAL at 09:20

## 2019-02-01 RX ADMIN — STANDARDIZED SENNA CONCENTRATE 8.6 MG: 8.6 TABLET ORAL at 21:45

## 2019-02-01 RX ADMIN — Medication 1000 MG: at 09:19

## 2019-02-01 RX ADMIN — OXYCODONE HYDROCHLORIDE 2.5 MG: 5 TABLET ORAL at 10:20

## 2019-02-01 RX ADMIN — METHOCARBAMOL 500 MG: 500 TABLET, FILM COATED ORAL at 21:45

## 2019-02-01 RX ADMIN — LOSARTAN POTASSIUM 50 MG: 50 TABLET, FILM COATED ORAL at 21:46

## 2019-02-01 RX ADMIN — CALCIUM CARBONATE 500 MG (1,250 MG)-VITAMIN D3 200 UNIT TABLET 1 TABLET: at 07:36

## 2019-02-01 RX ADMIN — ACETAMINOPHEN 975 MG: 325 TABLET, FILM COATED ORAL at 05:51

## 2019-02-01 RX ADMIN — ENOXAPARIN SODIUM 40 MG: 40 INJECTION SUBCUTANEOUS at 09:16

## 2019-02-01 RX ADMIN — LIDOCAINE 1 PATCH: 50 PATCH CUTANEOUS at 09:22

## 2019-02-01 RX ADMIN — CALCIUM CARBONATE 500 MG (1,250 MG)-VITAMIN D3 200 UNIT TABLET 1 TABLET: at 17:11

## 2019-02-01 RX ADMIN — CLOPIDOGREL BISULFATE 75 MG: 75 TABLET ORAL at 09:17

## 2019-02-01 RX ADMIN — NYSTATIN: 100000 POWDER TOPICAL at 17:15

## 2019-02-01 RX ADMIN — LEVOTHYROXINE SODIUM 112 MCG: 112 TABLET ORAL at 05:51

## 2019-02-01 RX ADMIN — METOPROLOL TARTRATE 25 MG: 25 TABLET, FILM COATED ORAL at 21:45

## 2019-02-01 RX ADMIN — METHOCARBAMOL 500 MG: 500 TABLET, FILM COATED ORAL at 14:19

## 2019-02-01 RX ADMIN — METHOCARBAMOL 500 MG: 500 TABLET, FILM COATED ORAL at 07:36

## 2019-02-01 RX ADMIN — DOCUSATE SODIUM 100 MG: 100 CAPSULE, LIQUID FILLED ORAL at 09:17

## 2019-02-01 RX ADMIN — LIDOCAINE 1 PATCH: 50 PATCH CUTANEOUS at 09:23

## 2019-02-01 RX ADMIN — METOPROLOL TARTRATE 25 MG: 25 TABLET, FILM COATED ORAL at 09:21

## 2019-02-01 RX ADMIN — FUROSEMIDE 20 MG: 20 TABLET ORAL at 09:20

## 2019-02-01 RX ADMIN — ACETAMINOPHEN 975 MG: 325 TABLET, FILM COATED ORAL at 14:18

## 2019-02-01 RX ADMIN — DOCUSATE SODIUM 100 MG: 100 CAPSULE, LIQUID FILLED ORAL at 17:11

## 2019-02-01 NOTE — PROGRESS NOTES
Physical Medicine and Rehabilitation Progress Note  Bong Morris 80 y o  female MRN: [id number]  Unit/Bed#: -01 Encounter: [id number]    HPI: Shiraz Isabel a 80 y  o  female who presented to the [facility] as a trauma following a fall on Plavix  She utilizes a SPC at baseline, and had hit a wet patch on the floor causing the cane to slip from under her  Her PMH is significant for HTN with history of CVA, AVR, HTN, and T2DM  CT C-Spine revealed a Type 3 Odontoid fracture extending to the R and L superior articular facets and L transverse foramen  She also had an acute non-displaced C6 fracture  Treated non-op  Course c/b by fluid overload, hospital delirium, and  Pain  These are improving  She was evaluated by PT/OT and determined to be appropriate for discharge to the [facility] on 1/17/19  Chief Complaint: "I'm doing well"    Interval: Patient s/e today at bedside  Feeling well  Had a bad experience having her blood pressure being taken in her upper arm this morning - anything that causes her anxiety or frustrates her causes her pain to flare  It is back to baseline this morning  No acute events overnight  She denies any CP, SOB, fevers, chills, N/V, numbness/tingling/weakness, bowel/bladder dysfunction  ROS:  Negative except for what is noted in HPI/CC/Sbuj    Assessment/Plan:      * Ambulatory dysfunction   Assessment & Plan    Patient will participate in a comprehensive inpatient rehab program with 3-5 hours a day 5-7 days a week of PT/OT  To evaluate for any further SLP needs  - Continue pain management  - Fall precautions     C6 cervical fracture (HCC)   Assessment & Plan    Non-op management  - Pain control as noted above in Type III odontoid fracture  - PT/OT     Type III fracture of odontoid process [facility])   Assessment & Plan    Please see ambulatory dysfunction    Follow-up X-rays  x2 1/28: some increased anterior displacement compared with 1/11 films  Neuro exam stable   - C-Collar ATC   - Cervical precautions  - Discussed with Neurosurgery Initial films on 1/28 were with patient bent forward, exaggerating displacement  Repeat XR done appeared stable  - Plan for follow-up with Neurosurgery 2 weeks with another XR of cervical spine  Lesion of left lung   Assessment & Plan    CT C/A/P - 1 cm groundglass attenuation density in the right lower lobe  Suggest reassessment with repeat chest CT in 3-6 months  Pain   Assessment & Plan    Improving neck and associated myofascial pain improving  - Continue scheduled APAP  - Adjust Robaxin 500mg TID for muscle spasms but limit impact on sleep with QID dosing  - Gabapentin 500mg BID scheduled for adjuvant  Pain control and chronic Fibro  Can increase if needed to up to 700mg BID    - Continue oxycodone 2 5mg to Q6hr PRN - patient titrating down appropriately  - Lidocaine patch     Hyponatremia   Assessment & Plan    Resolved  - Patient continuing Fluid restriction   - Sodium tabs discontinued  - Resolved with most recent Na on 1/31/18 136 < 135 <137  Imaging per prior providers:  - CTCAP to rule out other causes - unremarkable except for possible pulmonary nodule  - CTH unremarkable except for old, stable lacunar infarct  - Remainder of work-up unrevealing    - Restarted on furosemide  - Will monitor sodium periodically  History of aortic valve replacement with bioprosthetic valve   Assessment & Plan    Monitor cardiopulmonary status  IM to manage  - Cath without serious cardiac dz prior to her surgery  Fibromyalgia   Assessment & Plan    Continue sertraline  Continue gabapentin to 500mg BID  Consult neuropsychology for adjustment and coping skills for pain management       Rheumatoid arthritis involving multiple sites [facility])   Assessment & Plan    Continue plaquenil, and pain mgmt as outlined below  - Monitor for flare  - IM following     Stage 3 chronic kidney disease ([facility] [address] )   Assessment & Plan    Had an episode of HERLINDA  Now resolved with stable creatinine at 1 18    - Baseline crea ~ 1    - Nephro following, recs appreciated  Continue to monitor function on Lasix  - Renally dosed medications  - Avoiding nephrotoxic medications     - Nephrology has signed off and will follow-up with her at discharge  Depression   Assessment & Plan    Continue sertraline  Type 2 diabetes mellitus with diabetic polyneuropathy [facility])   Assessment & Plan    Lab Results   Component Value Date    HGBA1C 5 2 01/18/2019       No results for input(s): POCGLU in the last 72 hours  Blood Sugar Average: Last 72 hrs:  - Monitor fasting glucose  - Continue diabetic diet  - At home diet controlled  - Placed on diabetic diet  - IM to manage     History of CVA (cerebrovascular accident)   Assessment & Plan    Old R BG stroke appreciated on recent CT head   Cannot tolerate statins  Continue Plavix  Continue fish oil  At home on red yeast rice as well  Chronic diastolic heart failure (HCC)   Assessment & Plan    Continue lasix 20mg qday per nephro/IM  Monitor kidney function on lasix   Continue ARB/BB     Renovascular hypertension   Assessment & Plan    Mgmt per IM/Nephro  Amlodipine 5mg  Goal <130/80 but not by much per Nephrology  Losartan 50mg  Continue metoprolol tartrate 25mg Q12  Continue Lasix 20mg daily  S/p stenting for DONAVAN         # Skin  · Encourage regular turning as patient at risk for skin breakdown  · Staff to continue patient education on Q2h turning  · Maceration on labia and crural folds with hydraguard and ABD pads      # Bowel  · Patient reports no constipation  · last BM 1/31/19  Colace, Senna, Miralax      # Bladder  · Patient voiding spontaneously    # Pain  · Continue tylenol, for max of 3gm daily  · Continue oxycodone   · Continue methocarbamol  · Continue gabapentin  · Robaxin TID, Gabapentin increased to 500mg BID      # Rehab Psych   · referral to Rehabilitation Psychology    # Other  - Diet/Nutrition:        Diet Orders            Start     Ordered    01/24/19 1248  Diet Regular; Regular House; Fluid Restriction 1200 ML  Diet effective now     Question Answer Comment   Diet Type Regular    Regular Regular House    Other Restriction(s): Fluid Restriction 1200 ML    RD to adjust diet per protocol? Yes        01/24/19 1247    01/18/19 0721  Room Service  Once     Question:  Type of Service  Answer:  Room Service - Appropriate with Assistance    01/18/19 0720    01/17/19 1654  Dietary nutrition supplements  Once     Question Answer Comment   Select Supplement: Ensure Enlive-Vanilla    Frequency Lunch        01/17/19 1653        - DVT prophy: Sequential compression device (Venodyne)  and Enoxaparin (Lovenox)  - GI ppx: None  - Nausea: None  - Supplements: None  - Sleep: None    Disposition: Team 1/30/19 Making slow progress  Has decrease tolerance/endurance, UE strength/ROM, memory  Goal is to get patient to a Supervision level with ADLs/transfers/mobility with LRAD  She is making progress  Plan for discharge on 2/6/19       CODE: Level 1: Full Code   Scheduled Meds:    Current Facility-Administered Medications:  acetaminophen 975 mg Oral [facility] Lima Self MD   albuterol 2 puff Inhalation Q4H PRN Lima Self MD   amLODIPine 5 mg Oral Daily Shawn Swain MD   barium sulfate 450 mL Oral Once in imaging Shawn Swain MD   calcium carbonate-vitamin D 1 tablet Oral BID With Meals Lima Self MD   clopidogrel 75 mg Oral Daily Lima Self MD   docusate sodium 100 mg Oral BID Lima Self MD   enoxaparin 40 mg Subcutaneous Q24H [address] Lima Self MD   fish oil 1,000 mg Oral Daily Lima Self MD   furosemide 20 mg Oral Daily Madelaine Ponce MD   gabapentin 500 mg Oral BID Lima Self MD   hydroxychloroquine 200 mg Oral Daily With Breakfast Lima Self MD   levothyroxine 112 mcg Oral Early Morning Lima Self MD   lidocaine 1 patch Topical Daily Lima Self MD   lidocaine 1 patch Topical Daily Raven Angulo MD   losartan 50 mg Oral HS Jasvir Rubalcava MD   methocarbamol 500 mg Oral TID Wilda Dang MD   metoprolol tartrate 25 mg Oral Q12H [address] Raven Angulo MD   nystatin  Topical BID Raven Angulo MD   oxyCODONE 2 5 mg Oral Q6H PRN Raven Angulo MD   polyethylene glycol 17 g Oral Daily PRN Raven Angulo MD   senna 1 tablet Oral HS Raven Angulo MD   sertraline 25 mg Oral Daily Raven Angulo MD   trolamine salicylate 1 application Topical 4x Daily PRN Ewelina Perales PA-C        Objective:    Functional Update:   19 OT: Set-up/clean-up for eating, CGA for grooming, Deondre shower/bathe  Sup with UB dressing/LB dressing, maxA toilet hygiene, CGA toilet transfers  19 PT: S bed mobility, transfers, ambulation 150' with RW, toilet transfers    Allergies per EMR    Physical Exam:  Temp:  [97 5 °F (36 4 °C)-97 6 °F (36 4 °C)] 97 5 °F (36 4 °C)  HR:  [56-64] 58  Resp:  [18-19] 19  BP: (122-139)/(54-70) 139/65  SpO2:  [97 %-98 %] 97 %    General: alert, no apparent distress, cooperative and comfortable  HEENT:  Head: Normocephalic, no lesions, without obvious abnormality  C-collar in place  CARDIAC:  regular rate and rhythm, S1, S2 normal, no murmur, click, rub or gallop  LUNGS:  normal air entry, lungs clear to auscultation  ABDOMEN:  soft, non-tender  Bowel sounds normal  No masses, no organomegaly  EXTREMITIES:  Improved BL LE edema  NEURO:   normal without focal findings, mental status, speech normal, alert and oriented x3 and    PSYCH:  Normal  and Alert and oriented, appropriate affect  INTEG/SKIN: Significant moisture associated dermatitis of her bilateral outer labia and crural/intergluteal folds  MSK:  See MMT below    MMT:   Strength: (from 19)  Right  Left  Site  Right  Left  Site    5 5  S Ab: Shoulder Abductors   3 3  HF: Hip Flexors    5 5  EF: Elbow Flexors  5  5 KF: Knee Flexors    5  5  EE: Elbow Extensors  5  5  KE: Knee Extensors    5  5 WE: Wrist Extensors  5  5  DR: Dorsi Flexors    5  5  FF: Finger Flexors  5  5  PF: Plantar Flexors    5  5  HI: Hand Intrinsics  5  5  EHL: Extensor Hallucis Longus     Diagnostic Studies: Reviewed  XR follow up   Final Result by Ayo Oseguera MD (01/29 1848)      Anterior displacement at the odontoid fracture is unchanged from exam earlier in the same day, but increased from January 11  Unchanged alignment at C6 anterior syndesmophyte fracture  Workstation performed: [id number]         XR spine cervical 2 or 3 vw injury   Final Result by Victor Manuel Sweet MD (01/28 1142)      Increased anterior displacement of odontoid fracture  Workstation performed: [id number]         CT chest abdomen pelvis wo contrast   Final Result by Kush Hernandez MD (01/25 1954)      No suspicious appearing masses are seen  There is an approximately 1 cm groundglass attenuation density in the right lower lobe  Suggest reassessment with repeat chest CT in 3-6 months  Relatively mild colonic diverticulosis  Workstation performed: [id number]         CT head wo contrast   Final Result by Kush Hernandez MD (01/25 1943)      No acute intracranial abnormality  Moderate chronic microvascular ischemic change in periventricular white matter and cerebral atrophy  Workstation performed: [id number]         XR chest pa & lateral   Final Result by Lorna Roca DO (01/25 3158)   No acute cardiopulmonary disease              Workstation performed: [id number]             Laboratory: Reviewed    Results from last 7 days  Lab Units 01/31/19  0608 01/28/19  0541   HEMOGLOBIN g/dL 11 2* 10 8*   HEMATOCRIT % 35 7 34 2*   WBC Thousand/uL 6 25 5 85       Results from last 7 days  Lab Units 01/31/19  0608 01/30/19  0620 01/29/19  0524   BUN mg/dL 38* 40* 41*   SODIUM mmol/L 136 135* 137   POTASSIUM mmol/L 4 4 4 5 4 8   CHLORIDE mmol/L 101 101 102   CREATININE mg/dL 1 18 1 18 1 18 Patient Active Problem List   Diagnosis    Closed nondisplaced fracture of second cervical vertebra (HCC)    Neck pain, acute    Type III fracture of odontoid process ([facility] [address] )    C6 cervical fracture (HCC)    Hypertension    Hypothyroidism    Fall    Forgetfulness    Ambulatory dysfunction    Physical deconditioning    Acute pain    Delirium    Renovascular hypertension    Chronic diastolic heart failure (HCC)    History of CVA (cerebrovascular accident)    Type 2 diabetes mellitus with diabetic polyneuropathy (HCC)    Depression    Stage 3 chronic kidney disease (HCC)    Rheumatoid arthritis involving multiple sites ([facility] [address] )    Fibromyalgia    History of aortic valve replacement with bioprosthetic valve    Hyponatremia    Renal artery stenosis (HCC)    Parenchymal renal hypertension    Pain    Lesion of left lung       ** Please Note: Fluency Direct voice to text software may have been used in the creation of this document  **    Total visit time:  At least 25 minutes, with more than 50% spent counseling/coordinating care

## 2019-02-01 NOTE — PROGRESS NOTES
02/01/19 1230   Pain Assessment   Pain Assessment 0-10   Pain Score 3   Pain Type Acute pain   Pain Location Head   Pain Orientation Left;Right   Restrictions/Precautions   Precautions Bed/chair alarms; Fall Risk;Cognitive;Supervision on toilet/commode;Spinal precautions   Braces or Orthoses C/S Collar   Subjective   Subjective pt reports feeling a littl pain but tolerable    QI: Sit to Stand   Assistance Needed Adaptive equipment;Supervision   Sit to Stand CARE Score 4   Bed Mobility   Findings in recliner    QI: Chair/Bed-to-Chair Transfer   Assistance Needed Supervision   Chair/Bed-to-Chair Transfer CARE Score 4   Transfer Bed/Chair/Wheelchair   Limitations Noted In Coordination;Balance   [address] to Stand Supervision   Stand to [employer] Transfer Supervision   Bed, Chair, Wheelchair Transfer (FIM) 5 - Patient requires supervision/monitoring   QI: Car Transfer   Assistance Needed Supervision   Comment needs extra time with B/L LE    Car Transfer CARE Score 4   QI: Walk 10 Feet   Assistance Needed Supervision   Walk 10 Feet CARE Score 4   QI: Walk 50 Feet with Two Turns   Assistance Needed Supervision   Walk 50 Feet with Two Turns CARE Score 4   QI: Walk 150 Feet   Assistance Needed Supervision   Walk 150 Feet CARE Score 4   Ambulation   Does the patient walk? 2  Yes   Primary Discharge Mode of Locomotion Walk   Walk Assist Level Supervision   Gait Pattern Inconsistant Jenny;Decreased foot clearance; Improper weight shift; Wide JEFE   Assist Device Roller Kerline Fang Walked (feet) 155 ft  (X2)   Limitations Noted In Device Management;Balance; Endurance; Heel Strike   Findings progressing with gait training , pt seem more relax and steady    Walking (FIM) 5 - Patient requires supervision/monitoring AND distance 150 feet or more, no rest   QI: Wheel 150 Feet   Reason if not Attempted Activity not applicable   Wheel 749 Feet CARE Score 9 Wheelchair mobility   QI: Does the patient use a wheelchair? 0  No   Wheelchair (FIM) 0 - Activity does not occur   QI: 1 Step (Curb)   Assistance Needed Supervision; Adaptive equipment   Comment Close S    1 Step (Curb) CARE Score 4   QI: 4 Steps   Assistance Needed Supervision   Comment Single hand rail left side and ascending and descending sideways , pt better at front ascending and descending with HHA  plus single handrail    4 Steps CARE Score 4   Stairs   Type Stairs   # of Steps 6   Weight Bearing Precautions Fall Risk;Cervical   Assist Devices Single Rail   Findings nonreciproccal pattern VC 's for foot placement    Stairs (FIM) 2 - Patient goes up and down 4 - 11 stairs regardless of assist/device/setup   QI: Picking Up Object   Reason if not Attempted Safety concerns   Picking Up Object CARE Score 88   Toilet Transfer   Toilet Transfer (FIM) 0 - Activity does not occur   Therapeutic Interventions   Flexibility B/L LE stretching in sitting manual    Balance in ambulation  and ascending and descending stair, pt walking with RW on long mat to inc different surface   Assessment   Treatment Assessment pt perform skilled PT gait training with activities, functional activities /mobility with dynamic balance in standing w/o AD   Pt daugther instructed with F/T hand and body positioning chey with car transfer and ascending  and descending stair  Pt daughter shows good understanding all safety transfers and ambulation , stairs and car transfer for safety and to dec risk for falls   Pt will cont toward D/C goals and with skilled PT    Problem List Decreased strength;Decreased endurance; Impaired balance;Decreased mobility;Pain;Orthopedic restrictions   Barriers to Discharge Inaccessible home environment   PT Barriers   Functional Limitation Car transfers;Stair negotiation  (and long abdulaziz downstairs )   Plan   Treatment/Interventions Functional transfer training;Elevations; Endurance training;Patient/family training;Gait training;LE strengthening/ROM   Progress Progressing toward goals   PT Therapy Minutes   PT Time In 1230   PT Time Out 1330   PT Total Time (minutes) 60   PT Mode of treatment - Individual (minutes) 60   PT Mode of treatment - Concurrent (minutes) 0   PT Mode of treatment - Group (minutes) 0   PT Mode of treatment - Co-treat (minutes) 0   PT Mode of Teatment - Total time(minutes) 60 minutes   Therapy Time missed   Time missed?  No

## 2019-02-01 NOTE — PROGRESS NOTES
Occupational Therapy Treatment Note       02/01/19 1000   Pain Assessment   Pain Assessment No/denies pain   Restrictions/Precautions   Precautions Bed/chair alarms; Fall Risk;Cognitive;Supervision on toilet/commode;Spinal precautions   Braces or Orthoses C/S Collar   Lifestyle   Autonomy "I am not concerned" - with D/C home next week    QI: [address] Provided by [place] No physical assistance   Eating CARE Score 6   Eating Assessment   Findings pt able to cut food and bring to mouth with increased time, no use of adaptive equipment    Eating (FIM) 6 - Patient requires increased time or safety concern   Dressing/Undressing Clothing   Findings pt's daughter engaged in changing of c/s collar pads with min verbal instruction from OTR/L  Currently pt requires assist x2 to be present while pt seated and supported at back  Pt requires assist x1 for holding 1 piece of [place] collar and stabilizing head with assist of 2nd to change collar pads  2* [place] collar not fitting, pt's daughter questioning having 2nd [place] collar present  OTR/L called Jaja Pryor who verbalized he will bring 2nd [place] collar for pt to act as shower collar since adult and pediatric Estefani collars do not fit  QI: Sit to Stand   Assistance Needed Adaptive equipment;Supervision   Assistance Provided by [place] No physical assistance   Sit to Stand CARE Score 4   QI: Chair/Bed-to-Chair Transfer   Assistance Needed Supervision; Adaptive equipment   Assistance Provided by [place] No physical assistance   Chair/Bed-to-Chair Transfer CARE Score 4   Transfer Bed/Chair/Wheelchair   Limitations Noted In Balance; Endurance;LE Strength;UE Strength   Adaptive Equipment Roller Walker   Sit to [employer]   Stand to Xamplified pt completes functional mobility within room/bathroom at supervision level using RW    Bed, Chair, Wheelchair Transfer (FIM) 5 - Patient requires supervision/monitoring   QI: [address] Needed Physical assistance   Assistance Provided by [place] 50%-74%   Toileting Hygiene CARE Score 2   Toileting   Able to [address] down yes, up no  Manage Hygiene Bladder   Limitations Noted In Balance; Safety;UE Strength;LE Strength   Adaptive Equipment Grab Bar  (B/L)   Findings 2* decreased skin integrity  on buttock/labia, pt requires assistance to perform hygiene and manage brief up  ZENAIDA Caldwell present and educated pt's daughter on using silicone cream and abdominal pad in brief for skin integrity purposes each time after using toilet  Pt's daughter verbalizes understanding  Toileting (FIM) 2 - Patient completes 25-49% of all tasks   QI: Toilet Transfer   Assistance Needed Supervision; Adaptive equipment   Assistance Provided by [place] No physical assistance   Toilet Transfer CARE Score 4   Toilet Transfer   Surface Assessed Standard Toilet   Transfer Technique Standard   Limitations Noted In Balance; Endurance;UE Strength;LE Strength; Safety   Adaptive Equipment Grab Bar  (B/L)   Findings pt has access to B/L grab bars at home    Toilet Transfer (FIM) 5 - Patient requires supervision/monitoring   Cognition   Arousal/Participation Alert; Cooperative   Attention Within functional limits   Orientation Level Oriented to person;Oriented to place;Oriented to situation   Memory Decreased short term memory   Following Commands Follows one step commands without difficulty   Comments pt recalls spinal precautions during functional tasks    Activity Tolerance   Activity Tolerance Patient tolerated treatment well   Medical Staff Made Aware ZENAIDA Caldwell present to observe pt's skin on buttock/labia  See nursing notes/flowsheet for further details  Assessment   Treatment Assessment Pt participated in skilled OT tx session focused on family training with pt's daughter present   Pt able to complete functional mobility/transfers, including toilet transfer, at supervision level with use of RW  Pt provided with hip kit, and copy of receipt  Pt has raised toilet with B/L grab bars at home  Pt is recommended for RW and shower chair with back  OTR/L spoke with TEODORO LR, who will submit form for RW  Pt's daughter does not currently want to purchase shower chair with back, first check at home and with [facility] to see if she has one available  OT to follow up with daughter by Monday if she would like to have one purchased through [facility]  Pt's daughter demonstrates good carryover of techniques previously taught to her on c/s collar management and changing of [place] pads  Jaja Pryor will provide second [place] collar to pt as [place] collars for showering do no fit  Pt's daughter reports family and caregivers will provide pt with supervision/assistance at home for all functional mobility/transfers, ADLS, and anything in stance  Pt and daughter discussed plan to use bell for pt to ring for family and use cell phone to call her daughter at night when she requires assistance  Pt's daughter reports she will look into increasing caregiver support at home for when she is working, as she does not believe pt's son will be able to assist/be comfortable assisting with pt's current toileting needs  Provided pt with handout of example of toileting aide to increase pt's independence  Plan for D/C home with family support, supervision/assistance for all ADLS, functional transfers/mobility, or any task in stance, and home OT services  Prognosis Fair   Problem List Decreased strength;Decreased endurance; Impaired balance;Decreased mobility; Decreased range of motion;Decreased skin integrity;Pain   Plan   Treatment/Interventions ADL retraining;Functional transfer training; Therapeutic exercise; Endurance training;Cognitive reorientation;Patient/family training;Equipment eval/education; Bed mobility; Compensatory technique education   Progress Progressing toward goals   Recommendation   OT Discharge Recommendation Home OT   Equipment Recommended (RW, shower chair with back, )   OT Equipment ordered hip kit 2/1/19   OT Therapy Minutes   OT Time In 1000   OT Time Out 1130   OT Total Time (minutes) 90   OT Mode of treatment - Individual (minutes) 90   OT Mode of treatment - Concurrent (minutes) 0   OT Mode of treatment - Group (minutes) 0   OT Mode of treatment - Co-treat (minutes) 0   OT Mode of Teatment - Total time(minutes) 90 minutes   Therapy Time missed   Time missed?  No       Afsaneh De Paz MS, OTR/L , CBIS

## 2019-02-01 NOTE — PROGRESS NOTES
02/01/19 0830   Pain Assessment   Pain Assessment No/denies pain   Pain Score No Pain   Restrictions/Precautions   Precautions Bed/chair alarms; Fall Risk;Supervision on toilet/commode;Spinal precautions   Braces or Orthoses C/S Collar   Subjective   Subjective pt reports feeling okay and ready for PT session    QI: Lying to Sitting on Side of Bed   Comment in recliner   QI: Sit to Stand   Assistance Needed Adaptive equipment;Supervision   Sit to Stand CARE Score 4   QI: Chair/Bed-to-Chair Transfer   Assistance Needed Supervision   Chair/Bed-to-Chair Transfer CARE Score 4   Transfer Bed/Chair/Wheelchair   Limitations Noted In Coordination;Balance   [address] to Stand Supervision   Stand to Ender Gomez 74, Wheelchair Transfer (FIM) 5 - Patient requires supervision/monitoring   QI: Walk 50 Feet with Two Turns   Assistance Needed Supervision   Walk 50 Feet with Two Turns CARE Score 4   Ambulation   Does the patient walk? 2  Yes   Primary Discharge Mode of Locomotion Walk   Walk Assist Level Supervision   Gait Pattern Decreased foot clearance; Inconsistant Jenny   Assist Device Roller Walker   Distance Walked (feet) 50 ft   Limitations Noted In Balance;Device Management; Endurance   Findings walking short distance for home area    Walking (FIM) 5 - HOUSEHOLD EXCEPTION: Ambulates 50 feet or more, with or w/o a device, but completely independent   QI: Toilet Transfer   Assistance Needed Supervision; Adaptive equipment   Toilet Transfer CARE Score 4   Toilet Transfer   Surface Assessed Standard Toilet   Limitations Noted In Balance; Endurance   Adaptive Equipment Grab Bar   Findings extra time and AD needed    Toilet Transfer (FIM) 5 - Patient requires supervision/monitoring   Therapeutic Interventions   Balance balance for toiteling    Assessment   Treatment Assessment pt perform skilled PT session to inc balance awareness in short distance in her room , pt walking to bathroom and around her her for functional activities to inc fall awareness and dec fall risk , Pt educated with C' collar repositioning for safety   Pt will cont toward goals ,   Problem List Decreased strength;Decreased endurance;Decreased mobility;Pain;Orthopedic restrictions   Barriers to Discharge Inaccessible home environment   PT Barriers   Physical Impairment Decreased strength;Decreased range of motion;Decreased endurance; Impaired balance;Decreased mobility; Decreased skin integrity;Orthopedic restrictions;Pain   Plan   Treatment/Interventions Functional transfer training; Endurance training;Patient/family training;Gait training   Progress Progressing toward goals   PT Therapy Minutes   PT Time In 0830   PT Time Out 0900   PT Total Time (minutes) 30   PT Mode of treatment - Individual (minutes) 30   PT Mode of treatment - Concurrent (minutes) 0   PT Mode of treatment - Group (minutes) 0   PT Mode of treatment - Co-treat (minutes) 0   PT Mode of Teatment - Total time(minutes) 30 minutes   Therapy Time missed   Time missed?  No

## 2019-02-01 NOTE — PROGRESS NOTES
Internal Medicine Progress Note  Patient: Leana Christianson  Age/sex: 80 y o  female  Medical Record #: [id number]      ASSESSMENT/PLAN:  Leana Christianson is seen and examined and management for following issues:    Type III odontoid fracture with fracture line extending to the right/left superior articular facets and left transverse foramen of C2; nondisplaced fracture through bulky, bridging osteophytes at the C6 level:  required no surgery  Continue collar  Pt reported severe Rt facial pain on 1/19 and an afternoon dose of gabapentin was added  Facial pain she describes is intermitt and in front of ears      HTN with hx right RA stent: per renal = acceptable on Norvasc 5 mg qd, Cozaar 50mg qd, Lopressor 25mg q12hrs    Hyponatremia:  Resolved; renal did follow and have signed off  The NACL tabs 1 Gm BID are now stopped  Torsemide was switched back to Lasix 20 mg qd as at home; continue FR 1200  Renal ordered CXR 1/24 (negative) and CT C/A/P 1/25 negative for etiology for hyponatremia  Ground-glass attenuation RLL lower lung base: For repeat CT chest 3-6 months        CKD; baseline 1 0-1 1:  baseline per renal     Hx bioAVR 2012: had no significant disease when had card cath before surgery     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 2016:  continue Plavix that she has been on since CVA 2005; with her TIA 2016, neuro kept Plavix; she is intol statins and uses Red yeast rice and Fish oil at home     DM diet controlled:   watching fasting blood sugars  Stable      Chronic diastolic CHF, LVEF 86%, mild-mod MR/mild TR and norm function AVR 2016:  on Lasix 20mg qd as at home  Follows with Dr Gallito Mora  Family says LE edema is less than at home  CXR 1/24 was negative for CHF     RA/SLE; chr pain/fibromyalgia:  continue Plaquenil for RA; on Hydrocodone at home for chr pain      Hypothyroidism:  continue current Levothyroxine     Depression: Zoloft     Hx breast cancer: follows with Daniela Silver      Subjective:  denies any complaints      ROS:   GI: denies abdominal pain, change bowel habits or reflux symptoms  Neuro: +Right facial pain  Respiratory: No Cough, SOB  Cardiovascular: No CP, palpitations     Scheduled Meds:    Current Facility-Administered Medications:  acetaminophen 975 mg Oral [facility] Tanner Do MD   albuterol 2 puff Inhalation Q4H PRN Tanner Do MD   amLODIPine 5 mg Oral Daily Skyla Ge MD   barium sulfate 450 mL Oral Once in imaging Skyla Ge MD   calcium carbonate-vitamin D 1 tablet Oral BID With Meals Tanner Do MD   clopidogrel 75 mg Oral Daily Tanner Do MD   docusate sodium 100 mg Oral BID Tanner Do MD   enoxaparin 40 mg Subcutaneous Q24H [facility] Tanner Do MD   fish oil 1,000 mg Oral Daily Tanner Do MD   furosemide 20 mg Oral Daily Trell Laura MD   gabapentin 500 mg Oral BID Tanner Do MD   hydroxychloroquine 200 mg Oral Daily With Breakfast Tanner Do MD   levothyroxine 112 mcg Oral Early Morning Tanner Do MD   lidocaine 1 patch Topical Daily Tanner Do MD   lidocaine 1 patch Topical Daily Tanner Do MD   losartan 50 mg Oral HS Skyla Ge MD   methocarbamol 500 mg Oral TID Keily Blue MD   metoprolol tartrate 25 mg Oral Q12H [facility] Tanner Do MD   nystatin  Topical BID Tanner Do MD   oxyCODONE 2 5 mg Oral Q6H PRN Tanner Do MD   polyethylene glycol 17 g Oral Daily PRN Tanner Do MD   senna 1 tablet Oral HS Tanner Do MD   sertraline 25 mg Oral Daily Tanner Do MD   trolamine salicylate 1 application Topical 4x Daily PRN Ewelina Perales PA-C       Labs:       Results from last 7 days  Lab Units 01/31/19  0608 01/28/19  0541   WBC Thousand/uL 6 25 5 85   HEMOGLOBIN g/dL 11 2* 10 8*   HEMATOCRIT % 35 7 34 2*   PLATELETS Thousands/uL 181 193       Results from last 7 days  Lab Units 01/31/19  0608 01/30/19  0620   SODIUM mmol/L 136 135*   POTASSIUM mmol/L 4 4 4 5   CHLORIDE mmol/L 101 101   CO2 mmol/L 29 29   BUN mg/dL 38* 40*   CREATININE mg/dL 1 18 1 18   CALCIUM mg/dL 9 1 9 2                    [unfilled]    Labs reviewed    Physical Examination:  Vitals:   Vitals:    02/01/19 0518 02/01/19 0920 02/01/19 0921 02/01/19 1314   BP: 135/70 139/65 139/65 144/58   BP Location: Left arm   Left arm   Pulse: 59  58 59   Resp: 19   18   Temp: 97 5 °F (36 4 °C)   97 6 °F (36 4 °C)   TempSrc: Oral   Oral   SpO2: 97%   98%   Weight:       Height:           HEENT:  No thrush  RESP: CTAB, no R/R/W; resp unlabored  CV: +S1 S2, regular rate, no rubs/murmurs  ABD: soft, NT, ND, normal BS   EXT: edema of LEs L>R is stable  Neuro: AAO; [place] 4/5      [ X ] Total time spent: 30 Mins and greater than 50% of this time was spent counseling/coordinating care  ** Please Note: Dragon 360 Dictation voice to text software may have been used in the creation of this document   **

## 2019-02-02 PROCEDURE — 97110 THERAPEUTIC EXERCISES: CPT

## 2019-02-02 PROCEDURE — 97530 THERAPEUTIC ACTIVITIES: CPT

## 2019-02-02 PROCEDURE — 99232 SBSQ HOSP IP/OBS MODERATE 35: CPT | Performed by: PHYSICAL MEDICINE & REHABILITATION

## 2019-02-02 PROCEDURE — 97116 GAIT TRAINING THERAPY: CPT

## 2019-02-02 PROCEDURE — 97535 SELF CARE MNGMENT TRAINING: CPT

## 2019-02-02 RX ORDER — OXYCODONE HYDROCHLORIDE 5 MG/1
2.5 TABLET ORAL ONCE
Status: COMPLETED | OUTPATIENT
Start: 2019-02-02 | End: 2019-02-02

## 2019-02-02 RX ADMIN — METOPROLOL TARTRATE 25 MG: 25 TABLET, FILM COATED ORAL at 09:18

## 2019-02-02 RX ADMIN — ENOXAPARIN SODIUM 40 MG: 40 INJECTION SUBCUTANEOUS at 09:17

## 2019-02-02 RX ADMIN — AMLODIPINE BESYLATE 5 MG: 5 TABLET ORAL at 09:17

## 2019-02-02 RX ADMIN — OXYCODONE HYDROCHLORIDE 2.5 MG: 5 TABLET ORAL at 20:52

## 2019-02-02 RX ADMIN — DOCUSATE SODIUM 100 MG: 100 CAPSULE, LIQUID FILLED ORAL at 18:55

## 2019-02-02 RX ADMIN — ACETAMINOPHEN 975 MG: 325 TABLET, FILM COATED ORAL at 05:49

## 2019-02-02 RX ADMIN — CLOPIDOGREL BISULFATE 75 MG: 75 TABLET ORAL at 09:15

## 2019-02-02 RX ADMIN — FUROSEMIDE 20 MG: 20 TABLET ORAL at 09:18

## 2019-02-02 RX ADMIN — OXYCODONE HYDROCHLORIDE 2.5 MG: 5 TABLET ORAL at 05:49

## 2019-02-02 RX ADMIN — CALCIUM CARBONATE 500 MG (1,250 MG)-VITAMIN D3 200 UNIT TABLET 1 TABLET: at 09:15

## 2019-02-02 RX ADMIN — GABAPENTIN 500 MG: 400 CAPSULE ORAL at 18:55

## 2019-02-02 RX ADMIN — NYSTATIN: 100000 POWDER TOPICAL at 18:57

## 2019-02-02 RX ADMIN — STANDARDIZED SENNA CONCENTRATE 8.6 MG: 8.6 TABLET ORAL at 21:17

## 2019-02-02 RX ADMIN — METHOCARBAMOL 500 MG: 500 TABLET, FILM COATED ORAL at 05:50

## 2019-02-02 RX ADMIN — LIDOCAINE 1 PATCH: 50 PATCH CUTANEOUS at 09:16

## 2019-02-02 RX ADMIN — ACETAMINOPHEN 975 MG: 325 TABLET, FILM COATED ORAL at 13:53

## 2019-02-02 RX ADMIN — METOPROLOL TARTRATE 25 MG: 25 TABLET, FILM COATED ORAL at 21:17

## 2019-02-02 RX ADMIN — Medication 1000 MG: at 09:19

## 2019-02-02 RX ADMIN — CALCIUM CARBONATE 500 MG (1,250 MG)-VITAMIN D3 200 UNIT TABLET 1 TABLET: at 16:19

## 2019-02-02 RX ADMIN — NYSTATIN 1 APPLICATION: 100000 POWDER TOPICAL at 09:22

## 2019-02-02 RX ADMIN — DOCUSATE SODIUM 100 MG: 100 CAPSULE, LIQUID FILLED ORAL at 09:15

## 2019-02-02 RX ADMIN — ACETAMINOPHEN 975 MG: 325 TABLET, FILM COATED ORAL at 21:15

## 2019-02-02 RX ADMIN — METHOCARBAMOL 500 MG: 500 TABLET, FILM COATED ORAL at 21:17

## 2019-02-02 RX ADMIN — GABAPENTIN 500 MG: 400 CAPSULE ORAL at 09:15

## 2019-02-02 RX ADMIN — LEVOTHYROXINE SODIUM 112 MCG: 112 TABLET ORAL at 05:49

## 2019-02-02 RX ADMIN — OXYCODONE HYDROCHLORIDE 2.5 MG: 5 TABLET ORAL at 00:20

## 2019-02-02 RX ADMIN — OXYCODONE HYDROCHLORIDE 2.5 MG: 5 TABLET ORAL at 16:19

## 2019-02-02 RX ADMIN — HYDROXYCHLOROQUINE SULFATE 200 MG: 200 TABLET, FILM COATED ORAL at 09:19

## 2019-02-02 RX ADMIN — LOSARTAN POTASSIUM 50 MG: 50 TABLET, FILM COATED ORAL at 21:17

## 2019-02-02 RX ADMIN — SERTRALINE HYDROCHLORIDE 25 MG: 25 TABLET ORAL at 21:17

## 2019-02-02 RX ADMIN — METHOCARBAMOL 500 MG: 500 TABLET, FILM COATED ORAL at 13:53

## 2019-02-02 NOTE — PROGRESS NOTES
02/02/19 0830   Pain Assessment   Pain Assessment No/denies pain   Pain Score No Pain   Restrictions/Precautions   Precautions Bed/chair alarms; Fall Risk;Cognitive;Supervision on toilet/commode;Spinal precautions   ROM Restrictions Yes  (spinal precautions)   Braces or Orthoses C/S Collar   QI: Upper Body Dressing   Assistance Needed Supervision   Assistance Provided by [place] No physical assistance   Comment inc time/effort to don zip up jacket   Upper Body Dressing CARE Score 4   QI: Putting On/Taking Off [address] Needed Incidental touching   Assistance Provided by [place] Less than 25%   Comment Pt able to don diabetic sneakers w/ laces tied utilizing [facility], [facility] and R  Pt requires A to tie shoes and to adjust velcro strap in back of diabetic shoes  Pt reports having family at home to assist as needed  Putting On/Taking Off Footwear CARE Score 3   Dressing/Undressing Clothing   UB Dressing (FIM) 5 - Patient requires supervision/monitoring   QI: Sit to [address] Provided by [place] No physical assistance   Sit to Stand CARE Score 4   QI: Chair/Bed-to-Chair Transfer   Assistance Needed Supervision;Verbal cues   Assistance Provided by [place] No physical assistance   Comment VC to stay in RW, pt noted w/ improved carryover and able to teach back to OT  Chair/Bed-to-Chair Transfer CARE Score 4   Transfer Bed/Chair/Wheelchair   Limitations Noted In Balance; Endurance;UE Strength;LE Strength   Adaptive Equipment Roller Walker   Stand Pivot Supervision   Sit to Stand Supervision   Stand to [address]  [place], Chair, Wheelchair Transfer (FIM) 5 - Patient requires supervision/monitoring   Therapeutic Excerise-Strength   UE Strength Yes   Right Upper Extremity- Strength   R Position Seated   Equipment Theraband   RUE Strength Comment light resistance TB 3x10 gentle lateral chest pull and alternating tricep press   Pt also completed gentle scapular retraction within pt tolerance 2x10  Focus of exercise on inc pt overall endurance and UE fxn for fxnl reaching and improved postural control during ADLs/IADLs and all fxnl transfers  Left Upper Extremity-Strength   LUE Strength Comment See RUE for detail  Cognition   Overall Cognitive Status WFL   Arousal/Participation Alert; Cooperative   Memory Decreased short term memory   Following Commands Follows one step commands without difficulty   Activity Tolerance   Activity Tolerance Patient tolerated treatment well   Assessment   Treatment Assessment Pt participated in skilled OT Tx session w/ focus on endurance, light UE strengthening/ROM, safety awareness, AE training, and edu for safe RW mgmt  Pt reports no concerns for upcoming D/C home  Pt able to recall need to "spot check" C/S collar while home in mirror to ensure correct positioning  Pt able to verbalize to OT how C/S collar should look in order to A caregiver w/ readjusting  Pt completed fxnl mobility room<>OT gym w/ RW a S level and able to stay in RW more  Pt noted to utilize LE more than UE during sit<>stand transfers and overall demo inc LE strength and dec strain on neck/shoulders during transfers  Cont OT POC for carryover of safety awareness, RW mgmt, endurance, light UE strengthening, stand tolerance, and balance to maximize pt indep w/ ADLs/IADLs and all fxnl transfers  Prognosis Fair   Problem List Decreased strength;Decreased range of motion;Decreased endurance; Impaired balance;Decreased mobility;Obesity;Orthopedic restrictions;Pain   Barriers to Discharge Inaccessible home environment   Plan   Treatment/Interventions ADL retraining;Functional transfer training; Therapeutic exercise; Endurance training;Equipment eval/education; Compensatory technique education   Progress Progressing toward goals   Recommendation   OT Discharge Recommendation Home OT   OT Therapy Minutes   OT Time In 0830   OT Time Out 0930   OT Total Time (minutes) 60   OT Mode of treatment - Individual (minutes) 60   OT Mode of treatment - Concurrent (minutes) 0   OT Mode of treatment - Group (minutes) 0   OT Mode of treatment - Co-treat (minutes) 0   OT Mode of Teatment - Total time(minutes) 60 minutes   Therapy Time missed   Time missed?  No

## 2019-02-02 NOTE — PROGRESS NOTES
02/02/19 1345   Pain Assessment   Pain Assessment 0-10   Pain Score 8   Pain Type Acute pain   Pain Location Ear; Face   Pain Orientation Bilateral   Pain Descriptors Jabbing;Pressure   Pain Frequency Intermittent   Pain Onset Gradual   Clinical Progression Not changed   Effect of Pain on Daily Activities pain limits pt mobility    Hospital Pain Intervention(s) Rest;Distraction   Response to Interventions Pt subsides w/ rest and distraction, pt tolerated Tx   Restrictions/Precautions   Precautions Bed/chair alarms; Fall Risk;Spinal precautions;Cognitive;Supervision on toilet/commode   ROM Restrictions Yes   Braces or Orthoses C/S Collar   QI: Sit to [address] Provided by [place] No physical assistance   Sit to Stand CARE Score 4   QI: Chair/Bed-to-Chair Transfer   Assistance Needed Supervision   Assistance Provided by [place] No physical assistance   Chair/Bed-to-Chair Transfer CARE Score 4   Transfer Bed/Chair/Wheelchair   Limitations Noted In Balance; Endurance;Pain Management;UE Strength;LE Strength   Adaptive Equipment Roller Walker   Stand Pivot Supervision   Sit to Stand Supervision   Stand to Sit Supervision   Findings Pt demo improved carryover w/ staying in RW and noted w/ improved control stand>sit from previous OT session w/ overall inc safety for transfers  OT introduced clip on walker tray to pt which pt reports "I love this" and plans to discuss w/ DTR to purchase prior to D/C  Bed, Chair, Wheelchair Transfer (FIM) 5 - Patient requires supervision/monitoring   QI: [address] [address] Needed Physical assistance   Assistance Provided by [place] 50%-74%   Toileting Hygiene CARE Score 2   Toileting   Able to [address] down yes, up yes  Manage Hygiene Bladder; Bowel   Limitations Noted In Balance; Safety;UE Strength;LE Strength   Adaptive Equipment Grab Bar   Findings RN toileting pt at start of session  OT assisted pt to complete toileting   Pt in stance to complete CM over hips, pt states, "I can't pull my pants up"  However w/ encouragment from OT and alternating b/l release from GB/RW, pt able to complete CM over hips w/ inc time  RN reports pt dependent for rear hygiene following BM  Toileting (FIM) 3 - Patient completes  50-74% of all tasks   Kitchen Mobility   Kitchen-Mobility Level Walker   Kitchen Activity Retrieve items;Transport items   Kitchen Mobility Comments Pt engaged in IADL of preparing tea kettle to make hot tea w/ focus on utilizing walker tray, dynamic standing balance, fxnl reach w/in pt ROM, and overall endurance to inc pt indep at home  Pt completed activity at S level and cont to require VC to face cabinets/water/stove top while reaching to dec strain on neck, inc safety and maintain spinal precautions  Cognition   Overall Cognitive Status WFL   Arousal/Participation Alert; Cooperative   Attention Within functional limits   Orientation Level Oriented X4   Memory Decreased short term memory   Following Commands Follows one step commands with increased time or repetition   Activity Tolerance   Activity Tolerance Patient tolerated treatment well   Assessment   Treatment Assessment Pt participated in skilled OT Tx session w/ focus on toileting, dynamic standing balance, fxnl reaching, kitchen mobility, safety awareness, and endurance  Pt completed fxnl ambulation back to room from kitchen w/ no rest break and demo inc endurance for short distance fxnl mobility  Cont OT POC w/ focus on endurance, safety awareness, stand tolerance, kitchen mobility, and dynamic balance to maximize pt indep w/ ADLs/fxnl transfers  Prognosis Fair   Problem List Decreased strength;Decreased range of motion;Decreased endurance; Impaired balance;Decreased mobility;Pain;Orthopedic restrictions   Barriers to Discharge Inaccessible home environment   Plan   Treatment/Interventions ADL retraining;Functional transfer training; Therapeutic exercise; Endurance training; Compensatory technique education   Progress Progressing toward goals   Recommendation   OT Discharge Recommendation Home OT   Equipment Recommended Other (comment)  (clip on walker tray)   OT Equipment ordered (provided w/ purchase info, plan to f/u w/ DTR)   OT Therapy Minutes   OT Time In 1345   OT Time Out 1420   OT Total Time (minutes) 35   OT Mode of treatment - Individual (minutes) 35   OT Mode of treatment - Concurrent (minutes) 0   OT Mode of treatment - Group (minutes) 0   OT Mode of treatment - Co-treat (minutes) 0   OT Mode of Teatment - Total time(minutes) 35 minutes   Therapy Time missed   Time missed?  No

## 2019-02-02 NOTE — PROGRESS NOTES
02/02/19 1000   Pain Assessment   Pain Assessment 0-10   Pain Score 3   Pain Location Head   Pain Orientation Posterior   Hospital Pain Intervention(s) Medication (See MAR)   Restrictions/Precautions   Precautions Bed/chair alarms;Cognitive; Fall Risk;Supervision on toilet/commode;Spinal precautions   Weight Bearing Restrictions No   ROM Restrictions Yes  (cervical spine precautions)   Braces or Orthoses C/S Collar   Subjective   Subjective Pt seated in bedside recliner on entry, agreeable to participate   QI: Sit to [address] Provided by [place] No physical assistance   Sit to Stand CARE Score 4   Transfer Bed/Chair/Wheelchair   Limitations Noted In Balance;LE Strength;Pain Management   Adaptive Equipment Roller [employer] to Stand Supervision   Stand to W  R  [place], Chair, Wheelchair Transfer (FIM) 5 - Patient requires supervision/monitoring   QI: Walk 10 Feet   Assistance Needed Supervision; Adaptive equipment   Walk 10 Feet CARE Score 4   QI: Walk 50 Feet with Two Turns   Assistance Needed Supervision; Adaptive equipment   Walk 50 Feet with Two Turns CARE Score 4   QI: Walk 150 Feet   Assistance Needed Supervision; Adaptive equipment   Walk 150 Feet CARE Score 4   QI: Walking 10 Feet on Uneven Surfaces   Assistance Needed Supervision; Incidental touching; Adaptive equipment   Assistance Provided by [place] No physical assistance   Walking 10 Feet on Uneven Surfaces CARE Score 4   Ambulation   Does the patient walk? 2  Yes   Primary Discharge Mode of Locomotion Walk   Walk Assist Level Supervision   Gait Pattern Decreased foot clearance; Slow Jenny; Lateral deviation   Assist Device Roller Walker   Distance Walked (feet) 150 ft  (x2)   Limitations Noted In Balance;Strength;Swing;Speed; Heel Strike; Endurance   Walking (FIM) 5 - Patient requires supervision/monitoring AND distance 150 feet or more, no rest   Wheelchair mobility   QI: Does the patient use a wheelchair?  0  No   Wheelchair (FIM) 0 - Activity does not occur   QI: 1 Step (Curb)   Assistance Needed Supervision; Adaptive equipment;Verbal cues   1 Step (Curb) CARE Score 4   QI: 4 Steps   Assistance Needed Supervision;Verbal cues   Assistance Provided by [place] No physical assistance   Comment Non reciprocal pattern with bilateral HR assist   4 Steps CARE Score 4   Stairs   Type Stairs   # of Steps 6   Weight Bearing Precautions Cervical;Fall Risk   Stairs (FIM) 2 - Patient goes up and down 4 - 11 stairs regardless of assist/device/setup   Therapeutic Interventions   Strengthening Seated TE: LAQ with 2# CW x20, Hip ADD ball squeeze 3" x 20, GTB Hip ABD 3" x 20  Standing HR/TR x 20   Flexibility Manual hamstring stretch bilaterally 30" x 4 ea, self gastroc complex stretch in standing on 2" block 30" x 3 ea   Balance Pre SLS with one LE on red foam disc arms at side 30" x 3 ea, Rhomberg position eyes closed 3 x 30'   Equipment Use   NuStep L2 x 15' SPM > 60 throughout   Parallel Bars 6 lengths of: marching gait, retro gait, and sidestepping   Assessment   Treatment Assessment Pt participated in skilled PT session focusing on activity tolerance, functional strengthening and flexibility, as well as negotiation of community obstacles  Pt resting comfortably on arrival complaining of mild HA localized to posterior region, however agreeable to participate  Pt with excellent safety awareness throughout session, assuming a slowed cande however hypervigilant with all transfer performance and negotiation of obstacles  Pt does require verbal cueing for 2" curb negotiation with rolling walker and for sequencing when climbing  stairs  Static balance mildly impaired in NBOS positions with eyes closed, appropriate use of ankle strategy appreciated  Pt encouraged to focus on heel strike initial contact while ambulating back to her room with good carryover    Pt requests to sit in recliner for lunch with all needs in reach, elevated head discomfort to 4/10 however pleased with her tolerance for extended therapy session this morning  She will continue to benefit from PT intervention to maximize functional endurance and safety while progressing towards dc  Problem List Decreased strength;Decreased endurance; Impaired balance;Decreased mobility;Pain;Orthopedic restrictions   Barriers to Discharge Inaccessible home environment   PT Barriers   Physical Impairment Decreased strength;Decreased range of motion;Decreased endurance; Impaired balance;Decreased mobility; Decreased skin integrity;Orthopedic restrictions;Pain   Functional Limitation Car transfers;Stair negotiation   Plan   Treatment/Interventions Functional transfer training;LE strengthening/ROM; Elevations; Therapeutic exercise; Endurance training;Gait training;Bed mobility   Progress Progressing toward goals   PT Therapy Minutes   PT Time In 1000   PT Time Out 1130   PT Total Time (minutes) 90   PT Mode of treatment - Individual (minutes) 90   PT Mode of treatment - Concurrent (minutes) 0   PT Mode of treatment - Group (minutes) 0   PT Mode of treatment - Co-treat (minutes) 0   PT Mode of Teatment - Total time(minutes) 90 minutes   Therapy Time missed   Time missed?  No

## 2019-02-02 NOTE — PROGRESS NOTES
Internal Medicine Progress Note  Patient: Darren Player  Age/sex: 80 y o  female  Medical Record #: [id number]      ASSESSMENT/PLAN:  Darren Player is seen and examined and management for following issues:    Type III odontoid fracture with fracture line extending to the right/left superior articular facets and left transverse foramen of C2; nondisplaced fracture through bulky, bridging osteophytes at the C6 level:  required no surgery  Continue collar  Facial pain she describes is intermitt and in front of ears and is bilateral, mostly R/T activity  Continue Neurontin     HTN with hx right RA stent: per renal = acceptable on Norvasc 5 mg qd, Cozaar 50mg qd, Lopressor 25mg q12hrs    Hyponatremia:  Resolved; renal did follow and have signed off  The NACL tabs 1 Gm BID are now stopped  Torsemide was switched back to Lasix 20 mg qd as at home; continue FR 1200  Renal ordered CXR 1/24 (negative) and CT C/A/P 1/25 negative for etiology for hyponatremia  For BMP monday    Ground-glass attenuation RLL lower lung base: For repeat CT chest 3-6 months        CKD; baseline 1 0-1 1:  baseline per renal     Hx bioAVR 2012: had no significant disease when had card cath before surgery     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 2016:  continue Plavix that she has been on since CVA 2005; with her TIA 2016, neuro kept Plavix; she is intol statins and uses Red yeast rice and Fish oil at home     DM diet controlled:   watching fasting blood sugars  Stable      Chronic diastolic CHF, LVEF 93%, mild-mod MR/mild TR and norm function AVR 2016:  on Lasix 20mg qd as at home  Follows with Dr Capone Room  Family says LE edema is less than at home  CXR 1/24 was negative for CHF     RA/SLE; chr pain/fibromyalgia:  continue Plaquenil for RA; on Hydrocodone at home for chr pain      Hypothyroidism:  continue current Levothyroxine     Depression: Zoloft     Hx breast cancer: follows with Ever Gess      Subjective:  denies any complaints      ROS:   GI: denies abdominal pain, change bowel habits or reflux symptoms  Neuro: +Right facial pain  Respiratory: No Cough, SOB  Cardiovascular: No CP, palpitations     Scheduled Meds:    Current Facility-Administered Medications:  acetaminophen 975 mg Oral [facility] Tanner Do MD   albuterol 2 puff Inhalation Q4H PRN Tanner Do MD   amLODIPine 5 mg Oral Daily Skyla Ge MD   barium sulfate 450 mL Oral Once in imaging Skyla Ge MD   calcium carbonate-vitamin D 1 tablet Oral BID With Meals Tanner Do MD   clopidogrel 75 mg Oral Daily Tanner Do MD   docusate sodium 100 mg Oral BID Tanner Do MD   enoxaparin 40 mg Subcutaneous Q24H [facility] HOME Tanner Do MD   fish oil 1,000 mg Oral Daily Tanner Do MD   furosemide 20 mg Oral Daily Trell Laura MD   gabapentin 500 mg Oral BID Tanner Do MD   hydroxychloroquine 200 mg Oral Daily With Breakfast Tanner Do MD   levothyroxine 112 mcg Oral Early Morning Tanner Do MD   lidocaine 1 patch Topical Daily Tanner Do MD   lidocaine 1 patch Topical Daily Tanner Do MD   losartan 50 mg Oral HS Skyla Ge MD   methocarbamol 500 mg Oral TID Keily Blue MD   metoprolol tartrate 25 mg Oral Q12H [facility] Tanner Do MD   nystatin  Topical BID Tanner Do MD   oxyCODONE 2 5 mg Oral Q6H PRN Tanner Do MD   polyethylene glycol 17 g Oral Daily PRN Tanner Do MD   senna 1 tablet Oral HS Tanner Do MD   sertraline 25 mg Oral Daily Tanner Do MD   trolamine salicylate 1 application Topical 4x Daily PRN Ewelina Perales PA-C       Labs:       Results from last 7 days  Lab Units 01/31/19  0608 01/28/19  0541   WBC Thousand/uL 6 25 5 85   HEMOGLOBIN g/dL 11 2* 10 8*   HEMATOCRIT % 35 7 34 2*   PLATELETS Thousands/uL 181 193       Results from last 7 days  Lab Units 01/31/19  0608 01/30/19  0620   SODIUM mmol/L 136 135*   POTASSIUM mmol/L 4 4 4 5   CHLORIDE mmol/L 101 101   CO2 mmol/L 29 29   BUN mg/dL 38* 40*   CREATININE mg/dL 1 18 1 18 CALCIUM mg/dL 9 1 9 2                    [unfilled]    Labs reviewed    Physical Examination:  Vitals:   Vitals:    02/01/19 1314 02/01/19 2108 02/02/19 0500 02/02/19 0917   BP: 144/58 132/60 125/55 160/69   BP Location: Left arm Left arm Left arm    Pulse: 59 60 55 60   Resp: 18 18 18    Temp: 97 6 °F (36 4 °C) 98 1 °F (36 7 °C) 97 5 °F (36 4 °C)    TempSrc: Oral Oral Oral    SpO2: 98% 99% 96%    Weight:       Height:           HEENT:  No thrush  RESP: CTAB, no R/R/W; resp unlabored  CV: +S1 S2, regular rate, no rubs/murmurs  ABD: soft, NT, ND, normal BS   EXT: edema of LEs L>R is stable  Neuro: AAO; [place] 4/5      [ X ] Total time spent: 30 Mins and greater than 50% of this time was spent counseling/coordinating care  ** Please Note: Dragon 360 Dictation voice to text software may have been used in the creation of this document   **

## 2019-02-02 NOTE — PROGRESS NOTES
Physical Medicine and Rehabilitation Progress Note  Mey Toth 80 y o  female MRN: [id number]  Unit/Bed#: -01 Encounter: [id number]    Chief Complaint:  f/u fracture and f/u ambulatory dysfunction    Etiology/Reason for Admission: Impairment of mobility, safety and Activities of Daily Living (ADLs) due to Orthopedic Disorders:  08 9  Other Orthopedic spinal fractures    Interval History:  No acute events overnight  Patient seen examined at bedside  She denies any chest pain shortness of breath  Occasionally has pain to her temples, but she denies any double vision or blurry vision associated with it  Pain does not extend down to her lower face or her neck  She denies any headaches at this time  Assessment/Plan - Continue plan of care as per primary team, unless otherwise stated below:      · Rehab - Continue PT and OT  · Bowel - Patient reports no constipation  · Bladder - Patient voiding spontaneously  · Skin -  Encourage regular turning as patient at risk for skin breakdown  · Staff to continue patient education on Q2h turning  · Rehabilitation team to perform skin checks regularly  · Pain - Continue tylenol, for max of 3gm daily      · Continue oxycodone   · Continue methocarbamol  · Continue lidoderm patch(es)  · Continue gabapentin  · HTN - Stable, continue anti-hypertensives as per IM  · Anemia - Stable with last Hgb at 11 2, monitor with intermittent CBC  · HERLINDA - appears to have improved from high of 1 4  encourage fluid intake as needed    Josemanuel Alberts MD MPH  Physical Medicine and Rehabilitation    Review of Systems:    General ROS: negative for - chills or fever  Psychological ROS: negative for - anxiety or depression  Ophthalmic ROS: negative for - blurry vision or double vision  Respiratory ROS: no cough, shortness of breath, or wheezing  Cardiovascular ROS: no chest pain or dyspnea on exertion  Gastrointestinal ROS: no abdominal pain, change in bowel habits, or black or bloody stools  Genito-Urinary ROS: no dysuria, trouble voiding, or hematuria  Musculoskeletal ROS: negative for - muscle pain  Neurological ROS: no TIA or stroke symptoms  Dermatological ROS: negative for rash    Functional Update:  Physical Therapy Occupational Therapy   Weight Bearing Status: Full Weight Bearing  Transfers: Contact Guard  Bed Mobility: Contact Guard  Amulation Distance (ft): 150 feet  Ambulation: Contact Guard  Assistive Device for Ambulation: Roller Walker  Wheelchair Mobility Distance:  (NA)  Number of Stairs: 8  Assistive Device for Stairs: Gerardo Coins  Stair Assistance: Minimal Assistance  Ramp: Minimal Assistance  Assistive Device for Ramp: Roller Walker  Discharge Recommendations: Home with:  [address] with[de-identified] Family Support, Home Physical Therapy   Eating: Supervision  Grooming: Minimal Assistance  Bathing: Moderate Assistance  Bathing: Moderate Assistance  Upper Body Dressing: Minimal Assistance  Lower Body Dressing: Minimal Assistance  Toileting: Minimal Assistance  Tub/Shower Transfer: Moderate Assistance  Toilet Transfer: Minimal Assistance  Cognition: Exceptions to WNL  Cognition: Decreased Memory, Decreased Safety, Decreased Comprehension, Decreased Attention  Orientation: Person, Place, Time, Situation         Physical Exam:  Temp:  [97 5 °F (36 4 °C)-98 1 °F (36 7 °C)] 97 5 °F (36 4 °C)  HR:  [55-60] 60  Resp:  [18] 18  BP: (125-160)/(55-69) 160/69  SpO2:  [96 %-99 %] 96 %    General: alert, no apparent distress, cooperative and comfortable  HEENT:  Head: Normal, normocephalic, atraumatic  Eye: Normal external eye, conjunctiva, lids cornea, JUAN  Neck / Thyroid: C-collar in place  LUNGS:  no abnormal respiratory pattern noted  ABDOMEN:  soft, non-tender   Bowel sounds normal  No masses, no organomegaly  EXTREMITIES:  extremities normal, warm and well-perfused; no cyanosis, clubbing, or edema  NEURO:   mental status, speech normal, alert and oriented x3  PSYCH:  Alert and oriented, appropriate affect  Laboratory:  Reviewed    Results from last 7 days  Lab Units 01/31/19  0608 01/28/19  0541   HEMOGLOBIN g/dL 11 2* 10 8*   HEMATOCRIT % 35 7 34 2*   WBC Thousand/uL 6 25 5 85       Results from last 7 days  Lab Units 01/31/19  0608 01/30/19  0620 01/29/19  0524   BUN mg/dL 38* 40* 41*   SODIUM mmol/L 136 135* 137   POTASSIUM mmol/L 4 4 4 5 4 8   CHLORIDE mmol/L 101 101 102   CREATININE mg/dL 1 18 1 18 1 18            Diagnostic Studies: Reviewed   XR follow up   Final Result by Ugo Sandhu MD (01/29 1848)      Anterior displacement at the odontoid fracture is unchanged from exam earlier in the same day, but increased from January 11  Unchanged alignment at C6 anterior syndesmophyte fracture  Workstation performed: [id number]         XR spine cervical 2 or 3 vw injury   Final Result by Cristian Gaston MD (01/28 1142)      Increased anterior displacement of odontoid fracture  Workstation performed: [id number]         CT chest abdomen pelvis wo contrast   Final Result by Burt George MD (01/25 1954)      No suspicious appearing masses are seen  There is an approximately 1 cm groundglass attenuation density in the right lower lobe  Suggest reassessment with repeat chest CT in 3-6 months  Relatively mild colonic diverticulosis  Workstation performed: [id number]         CT head wo contrast   Final Result by Burt George MD (01/25 1943)      No acute intracranial abnormality  Moderate chronic microvascular ischemic change in periventricular white matter and cerebral atrophy  Workstation performed: [id number]         XR chest pa & lateral   Final Result by Rubin Yeung DO (01/25 1712)   No acute cardiopulmonary disease  Workstation performed: [id number]               ** Please Note: Fluency Direct voice to text software may have been used in the creation of this document   **      Current Facility-Administered Medications:     acetaminophen (TYLENOL) tablet 975 mg, 975 mg, Oral, Q8H [address], Judd Christianson MD, 975 mg at 02/02/19 1353    albuterol (PROVENTIL HFA,VENTOLIN HFA) inhaler 2 puff, 2 puff, Inhalation, Q4H PRN, Judd Christianson MD    amLODIPine (NORVASC) tablet 5 mg, 5 mg, Oral, Daily, Lm Stoddard MD, 5 mg at 02/02/19 0237    barium sulfate 2 1 % suspension 450 mL, 450 mL, Oral, Once in imaging, Lm Stoddard MD    calcium carbonate-vitamin D (OSCAL-D) 500 mg-200 units per tablet 1 tablet, 1 tablet, Oral, BID With Meals, Judd Christianson MD, 1 tablet at 02/02/19 0915    clopidogrel (PLAVIX) tablet 75 mg, 75 mg, Oral, Daily, Judd Crhistianson MD, 75 mg at 02/02/19 0915    docusate sodium (COLACE) capsule 100 mg, 100 mg, Oral, BID, Judd Christianson MD, 100 mg at 02/02/19 0915    enoxaparin (LOVENOX) subcutaneous injection 40 mg, 40 mg, Subcutaneous, Q24H [address], Judd Christianson MD, 40 mg at 02/02/19 7706    fish oil capsule 1,000 mg, 1,000 mg, Oral, Daily, Judd Christianson MD, 1,000 mg at 02/02/19 0919    furosemide (LASIX) tablet 20 mg, 20 mg, Oral, Daily, Valente Duran MD, 20 mg at 02/02/19 2542    gabapentin (NEURONTIN) capsule 500 mg, 500 mg, Oral, BID, Judd Christianson MD, 500 mg at 02/02/19 0915    hydroxychloroquine (PLAQUENIL) tablet 200 mg, 200 mg, Oral, Daily With Breakfast, Judd Christianson MD, 200 mg at 02/02/19 0919    levothyroxine tablet 112 mcg, 112 mcg, Oral, Early Morning, Judd Christianson MD, 112 mcg at 02/02/19 0549    lidocaine (LIDODERM) 5 % patch 1 patch, 1 patch, Topical, Daily, Judd Christianson MD, 1 patch at 02/02/19 0916    lidocaine (LIDODERM) 5 % patch 1 patch, 1 patch, Topical, Daily, Judd Christianson MD, 1 patch at 02/02/19 0916    losartan (COZAAR) tablet 50 mg, 50 mg, Oral, HS, Lm Stoddard MD, 50 mg at 02/01/19 2146    methocarbamol (ROBAXIN) tablet 500 mg, 500 mg, Oral, TID, Liz Singh MD, 500 mg at 02/02/19 1353    metoprolol tartrate (LOPRESSOR) tablet 25 mg, 25 mg, Oral, Q12H [facility] HOME, Johanny Trivedi MD, 25 mg at 02/02/19 8493    nystatin (MYCOSTATIN) powder, , Topical, BID, Johanny Trivedi MD, 1 application at 95/69/54 3742    oxyCODONE (ROXICODONE) IR tablet 2 5 mg, 2 5 mg, Oral, Q6H PRN, Johanny Trivedi MD, 2 5 mg at 02/02/19 0549    polyethylene glycol (MIRALAX) packet 17 g, 17 g, Oral, Daily PRN, Johanny Trivedi MD    [facility]) tablet 8 6 mg, 1 tablet, Oral, HS, Johanny Trivedi MD, 8 6 mg at 02/01/19 2145    sertraline (ZOLOFT) tablet 25 mg, 25 mg, Oral, Daily, Johanny Trivedi MD, 25 mg at 02/01/19 2146    trolamine salicylate (ASPERCREME) 10 % cream 1 application, 1 application, Topical, 4x Daily PRN, Ewelina Perales PA-C, 1 application at 52/32/82 4900

## 2019-02-03 PROCEDURE — 97110 THERAPEUTIC EXERCISES: CPT

## 2019-02-03 PROCEDURE — 97530 THERAPEUTIC ACTIVITIES: CPT

## 2019-02-03 PROCEDURE — 97112 NEUROMUSCULAR REEDUCATION: CPT

## 2019-02-03 PROCEDURE — 97535 SELF CARE MNGMENT TRAINING: CPT

## 2019-02-03 RX ADMIN — ACETAMINOPHEN 975 MG: 325 TABLET, FILM COATED ORAL at 21:40

## 2019-02-03 RX ADMIN — CALCIUM CARBONATE 500 MG (1,250 MG)-VITAMIN D3 200 UNIT TABLET 1 TABLET: at 09:22

## 2019-02-03 RX ADMIN — Medication 1000 MG: at 09:16

## 2019-02-03 RX ADMIN — AMLODIPINE BESYLATE 5 MG: 5 TABLET ORAL at 09:14

## 2019-02-03 RX ADMIN — LIDOCAINE 1 PATCH: 50 PATCH CUTANEOUS at 09:15

## 2019-02-03 RX ADMIN — METHOCARBAMOL 500 MG: 500 TABLET, FILM COATED ORAL at 06:04

## 2019-02-03 RX ADMIN — HYDROXYCHLOROQUINE SULFATE 200 MG: 200 TABLET, FILM COATED ORAL at 09:16

## 2019-02-03 RX ADMIN — SERTRALINE HYDROCHLORIDE 25 MG: 25 TABLET ORAL at 21:40

## 2019-02-03 RX ADMIN — METOPROLOL TARTRATE 25 MG: 25 TABLET, FILM COATED ORAL at 09:14

## 2019-02-03 RX ADMIN — GABAPENTIN 500 MG: 400 CAPSULE ORAL at 09:20

## 2019-02-03 RX ADMIN — OXYCODONE HYDROCHLORIDE 2.5 MG: 5 TABLET ORAL at 05:48

## 2019-02-03 RX ADMIN — CALCIUM CARBONATE 500 MG (1,250 MG)-VITAMIN D3 200 UNIT TABLET 1 TABLET: at 16:45

## 2019-02-03 RX ADMIN — ACETAMINOPHEN 975 MG: 325 TABLET, FILM COATED ORAL at 05:47

## 2019-02-03 RX ADMIN — ACETAMINOPHEN 975 MG: 325 TABLET, FILM COATED ORAL at 13:51

## 2019-02-03 RX ADMIN — LEVOTHYROXINE SODIUM 112 MCG: 112 TABLET ORAL at 05:47

## 2019-02-03 RX ADMIN — CLOPIDOGREL BISULFATE 75 MG: 75 TABLET ORAL at 09:20

## 2019-02-03 RX ADMIN — STANDARDIZED SENNA CONCENTRATE 8.6 MG: 8.6 TABLET ORAL at 21:40

## 2019-02-03 RX ADMIN — ENOXAPARIN SODIUM 40 MG: 40 INJECTION SUBCUTANEOUS at 09:14

## 2019-02-03 RX ADMIN — FUROSEMIDE 20 MG: 20 TABLET ORAL at 09:14

## 2019-02-03 RX ADMIN — METHOCARBAMOL 500 MG: 500 TABLET, FILM COATED ORAL at 13:51

## 2019-02-03 RX ADMIN — NYSTATIN: 100000 POWDER TOPICAL at 18:03

## 2019-02-03 RX ADMIN — DOCUSATE SODIUM 100 MG: 100 CAPSULE, LIQUID FILLED ORAL at 09:14

## 2019-02-03 RX ADMIN — GABAPENTIN 500 MG: 400 CAPSULE ORAL at 18:02

## 2019-02-03 RX ADMIN — NYSTATIN: 100000 POWDER TOPICAL at 09:16

## 2019-02-03 RX ADMIN — METHOCARBAMOL 500 MG: 500 TABLET, FILM COATED ORAL at 21:40

## 2019-02-03 RX ADMIN — DOCUSATE SODIUM 100 MG: 100 CAPSULE, LIQUID FILLED ORAL at 18:02

## 2019-02-03 RX ADMIN — METOPROLOL TARTRATE 25 MG: 25 TABLET, FILM COATED ORAL at 21:40

## 2019-02-03 RX ADMIN — OXYCODONE HYDROCHLORIDE 2.5 MG: 5 TABLET ORAL at 16:46

## 2019-02-03 NOTE — PROGRESS NOTES
02/03/19 0830   Pain Assessment   Pain Assessment 0-10   Pain Score 2   Pain Type Acute pain   Pain Location Neck   Pain Orientation Bilateral   Pain Radiating Towards shoulders   Pain Descriptors Sore   Pain Frequency Intermittent   Hospital Pain Intervention(s) (STM upper traps)   Response to Interventions pain subsided   Restrictions/Precautions   Precautions Bed/chair alarms; Fall Risk;Spinal precautions;Supervision on toilet/commode   Braces or Orthoses C/S Collar   Cognition   Overall Cognitive Status WFL   Arousal/Participation Alert; Cooperative   Attention Within functional limits   Orientation Level Oriented X4   Memory Decreased short term memory   Following Commands Follows one step commands with increased time or repetition   Subjective   Subjective reports her shoulders get sore   QI: Sit to Stand   Assistance Needed Supervision   Sit to Stand CARE Score 4   QI: Chair/Bed-to-Chair Transfer   Assistance Needed Supervision   Chair/Bed-to-Chair Transfer CARE Score 4   Transfer Bed/Chair/Wheelchair   Limitations Noted In Balance;UE Strength;LE Strength   Adaptive Equipment Roller Walker   Stand Pivot Supervision   Sit to Stand Supervision   Stand to W  R  [place], Chair, Wheelchair Transfer (FIM) 5 - Patient requires supervision/monitoring   QI: Walk 10 Feet   Assistance Needed Supervision   Walk 10 Feet CARE Score 4   QI: Walk 50 Feet with Two Turns   Assistance Needed Supervision   Walk 50 Feet with Two Turns CARE Score 4   QI: Walk 150 Feet   Assistance Needed Supervision   Walk 150 Feet CARE Score 4   Ambulation   Does the patient walk? 2  Yes   Primary Discharge Mode of Locomotion Walk   Walk Assist Level Supervision   Gait Pattern Inconsistant Jenny; Slow Jenny; Step through   Assist Device Lisa Fang Walked (feet) 150 ft   Limitations Noted In Balance; Heel Strike;Speed;Strength;Swing   Walking (FIM) 5 - Patient requires supervision/monitoring AND distance 150 feet or more, no rest   Wheelchair mobility   QI: Does the patient use a wheelchair? 0  No   QI: 1 Step (Curb)   Assistance Needed Supervision   Comment CS   1 Step (Curb) CARE Score 4   Stairs   Type Stairs;Curb   # of Steps 2   Weight Bearing Precautions Cervical;Fall Risk   Assist Devices Hand Hold;Single Rail   Findings HHA on non-rail side for home setup   Therapeutic Interventions   Strengthening LAQ, standing marches 15x2 2 5# CW; abduction blue TB 15x2   Flexibility hamstring and gastroc 60"x2   Other Comments   Comments STM to bilat upper trap for 6 mins   Assessment   Treatment Assessment good safety awareness and sequencing for all xfers with RW; pt has decreased ROM for hip flexion to use stairs due to anatomical structure; she is able to get up and down stairs with 1 HR which is the setup she has at home and reports her daughter helps her with this, min A with the HHA on the non-rail side; ROM is not sufficient for stepping straight up, she needs to lean to her L to get her RLE up a 6" curb or stair; mobility is slow but she has good endurance for amb and TE; pt would benefit from continued skilled PT to increase strength in BLE, and balance for safe and functional mobility/activity; continue POC as per PT   Problem List Decreased strength;Decreased range of motion;Decreased endurance; Impaired balance;Decreased mobility;Pain;Orthopedic restrictions;Decreased skin integrity   Barriers to Discharge Inaccessible home environment   PT Barriers   Functional Limitation Car transfers;Stair negotiation   Plan   Treatment/Interventions ADL retraining;Functional transfer training;LE strengthening/ROM; Elevations; Therapeutic exercise; Endurance training;Patient/family training;Gait training;Equipment eval/education   Progress Progressing toward goals   Recommendation   Recommendation Outpatient PT; Home with family support   Equipment Recommended Walker   PT Therapy Minutes   PT Time In 0830   PT Time Out 0930   PT Total Time (minutes) 60   PT Mode of treatment - Individual (minutes) 60   PT Mode of treatment - Concurrent (minutes) 0   PT Mode of treatment - Group (minutes) 0   PT Mode of treatment - Co-treat (minutes) 0   PT Mode of Teatment - Total time(minutes) 60 minutes   Therapy Time missed   Time missed?  No

## 2019-02-03 NOTE — PROGRESS NOTES
02/03/19 1040   Pain Assessment   Pain Assessment 0-10   Pain Score 3   Pain Type Acute pain   Pain Location Neck   Pain Orientation Bilateral;Posterior   Pain Radiating Towards shoulders   Pain Descriptors Sore   Pain Frequency Intermittent   Pain Onset Gradual   Clinical Progression Not changed   Effect of Pain on Daily Activities impacts pt's sleep, pt reports not sleeping at night   Patient's Stated Pain Goal No pain   Hospital Pain Intervention(s) Ambulation/increased activity;Repositioned;Relaxation technique   Response to Interventions Pt tolerated Tx   Restrictions/Precautions   Precautions Bed/chair alarms; Fall Risk;Supervision on toilet/commode;Pain;Spinal precautions   ROM Restrictions Yes   Braces or Orthoses C/S Collar   QI: Sit to [address] Provided by [facility] No physical assistance   Sit to Stand CARE Score 4   QI: Chair/Bed-to-Chair Transfer   Assistance Needed Supervision   Assistance Provided by [facility] No physical assistance   Chair/Bed-to-Chair Transfer CARE Score 4   Transfer Bed/Chair/Wheelchair   Limitations Noted In Balance; Endurance;LE Strength;UE Strength   Adaptive Equipment Roller Walker   Stand Pivot Supervision   Sit to Stand Supervision   Stand to Sit Supervision   Findings Pt cont to demo improved safety awareness for fxnl transfers, however requires VC to stay in RW to dec pressure placed through b/l UE during fxnl ambulation OT gym>pt room  Bed, Chair, Wheelchair Transfer (FIM) 5 - Patient requires supervision/monitoring   Functional Standing Tolerance   Time 6 mins   Activity Static standing on balance disc   Comments Pt CG-CS in stance on balance disc w/ RW at raised table top to engage in pt preferred theract of completing a puzzle  Pt demo inc difficulty w/ puzzle completion and reports "it is more difficult than it used to be to do a puzzle"   Focus of activity on standing balance, stand tolerance, activity tolerance, and fxnl reaching to maximize pt indep w/ ADLs/IADLs and all fxnl transfers  Pt tolerated activity w/ no c/o pain  Therapeutic Exercise - ROM   UE-ROM Yes   ROM- Right Upper Extremities   R Shoulder AROM; Flexion; Extension   R Position Seated   Equipment Other (Comment)  (jeffrey, no weight applied)   R Weight/Reps/Sets 2x15   RUE ROM Comment Pt seated at table top raised to shoulder height to dec neck flexion and completed 2x15 table top jeffrey w/ no resistance for light stretch of b/l UE and spinal precautions maintained  Pt tolerated exercise  Pt c/o tightness upper traps  Pt in stance to complete 3x10 retrograde shoulder rolls to improve posture and break up tension/tightness in upper traps to dec pt pain and inc pt indep w/ ADL fxn and transfers  ROM - Left Upper Extremities    LUE ROM Comment See RUE for detail  Cognition   Overall Cognitive Status WFL   Arousal/Participation Alert; Cooperative   Attention Within functional limits   Orientation Level Oriented X4   Memory Decreased short term memory   Following Commands Follows one step commands with increased time or repetition   Activity Tolerance   Activity Tolerance Patient tolerated treatment well   Assessment   Treatment Assessment Pt participated in skilled OT Tx session w/ focus on b/l UE AROM within pt tolerance, stand tolerance, standing balance, and fxnl transfers  See above note for activity detail  Pt cont to report that she has "not slept well in days because of face/neck pain"  Pt has poor tolerance for reclined position and reports that placing her "head against anything causes pain"  OT rearranged all pt pillows in recliner in effort to determine optimal position for comfort and provided edu on need to have less pillows and to allow head to rest w/ spine in improved alignment to dec pt neck pain, however pt cont to have no tolerance for various positions and cont to be unable to rest head against care cushion or pillow   Pt reports her recliner at home "is like sinking into a cloud and it will be more comfortable, this recliner is too hard and needs lots of pillows"  Pt poor sleep hygiene and pain in neck cont to limit pt activity tolerance and pt reports feeling "tired" often during therapy  Pt is noted w/ improved transfers and carryover for safety  Cont OT POC w/ focus on optimal positioning for sleep routine, pain mgmt, stand tolerance, dynamic standing balance, and endurance to maximize pt indep w/ ADLs and all fxnl transfers  Pt plan to purchase clip on walker tray through [facility], however there were no trays in storage closet today, Mo Spivey aware and plan to restock during the week, please f/u w/ pt to purchase  Prognosis Good   Problem List Decreased strength;Decreased range of motion;Decreased endurance; Impaired balance;Orthopedic restrictions;Decreased skin integrity;Pain   Barriers to Discharge Inaccessible home environment   Plan   Treatment/Interventions ADL retraining;Functional transfer training; Therapeutic exercise; Endurance training; Compensatory technique education   Progress Progressing toward goals   Recommendation   OT Discharge Recommendation Home OT   Equipment Recommended Other (comment)  (clip on tray walker, please f/u for purchase)   OT Therapy Minutes   OT Time In 1040   OT Time Out 1140   OT Total Time (minutes) 60   OT Mode of treatment - Individual (minutes) 40   OT Mode of treatment - Concurrent (minutes) 0   OT Mode of treatment - Group (minutes) 0   OT Mode of treatment - Co-treat (minutes) 0   OT Mode of Teatment - Total time(minutes) 40 minutes   Therapy Time missed   Time missed?  No

## 2019-02-03 NOTE — PROGRESS NOTES
Internal Medicine Progress Note  Patient: Samir Ordoñez  Age/sex: 80 y o  female  Medical Record #: [id number]      ASSESSMENT/PLAN:  Samir Ordoñez is seen and examined and management for following issues:    Type III odontoid fracture with fracture line extending to the right/left superior articular facets and left transverse foramen of C2; nondisplaced fracture through bulky, bridging osteophytes at the C6 level:  required no surgery  Continue collar  Facial pain she describes is intermitt and in front of ears and is bilateral, mostly R/T activity  Continue Neurontin     HTN with hx right RA stent: per renal = acceptable on Norvasc 5 mg qd, Cozaar 50mg qd, Lopressor 25mg q12hrs    Hyponatremia:  Resolved; renal did follow and have signed off  The NACL tabs 1 Gm BID are now stopped  Torsemide was switched back to Lasix 20 mg qd as at home; continue FR 1200  Renal ordered CXR 1/24 (negative) and CT C/A/P 1/25 negative for etiology for hyponatremia  For BMP monday    Ground-glass attenuation RLL lower lung base: For repeat CT chest 3-6 months        CKD; baseline 1 0-1 1:  baseline per renal     Hx bioAVR 2012: had no significant disease when had card cath before surgery     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 2016:  continue Plavix that she has been on since CVA 2005; with her TIA 2016, neuro kept Plavix; she is intol statins and uses Red yeast rice and Fish oil at home     DM diet controlled:   watching fasting blood sugars  Stable      Chronic diastolic CHF, LVEF 71%, mild-mod MR/mild TR and norm function AVR 2016:  on Lasix 20mg qd as at home  Follows with Dr Pedro Spearing  Family says LE edema is less than at home    CXR 1/24 was negative for CHF     RA/SLE; chr pain/fibromyalgia:  continue Plaquenil for RA; on Hydrocodone at home for chr pain      Hypothyroidism:  continue current Levothyroxine     Depression: Zoloft     Hx breast cancer: follows with Myrtle Alford      Subjective:  denies any complaints currently but had severe pain last night in top of head and ears down side of face bilaterally    ROS:   GI: denies abdominal pain, change bowel habits or reflux symptoms  Neuro: +Right facial pain  Respiratory: No Cough, SOB  Cardiovascular: No CP, palpitations     Scheduled Meds:    Current Facility-Administered Medications:  acetaminophen 975 mg Oral [facility] Christine Goldstein, MD   albuterol 2 puff Inhalation Q4H PRN Christine Goldstein, MD   amLODIPine 5 mg Oral Daily Frank Gandara MD   barium sulfate 450 mL Oral Once in imaging Frank Gandara MD   calcium carbonate-vitamin D 1 tablet Oral BID With Meals Christine Goldstein, MD   clopidogrel 75 mg Oral Daily Christine Filter, MD   docusate sodium 100 mg Oral BID Christine Filter, MD   enoxaparin 40 mg Subcutaneous Q24H [address] Christine Filter, MD   fish oil 1,000 mg Oral Daily Christine Filter, MD   furosemide 20 mg Oral Daily Enrigue Banco, MD   gabapentin 500 mg Oral BID Christine Filter, MD   hydroxychloroquine 200 mg Oral Daily With Breakfast Christine Filter, MD   levothyroxine 112 mcg Oral Early Morning Christine Filter, MD   lidocaine 1 patch Topical Daily Christine Filter, MD   lidocaine 1 patch Topical Daily Christine Filter, MD   losartan 50 mg Oral HS Frank Gandara, MD   methocarbamol 500 mg Oral TID Gerard Query, MD   metoprolol tartrate 25 mg Oral Q12H [address] Christine Goldstein, MD   nystatin  Topical BID Christine Filter, MD   oxyCODONE 2 5 mg Oral Q6H PRN Christine Goldstein, MD   polyethylene glycol 17 g Oral Daily PRN Christine Filter, MD   senna 1 tablet Oral HS Christine Filter, MD   sertraline 25 mg Oral Daily Christine Filter, MD   trolamine salicylate 1 application Topical 4x Daily PRN Ewelina Perales PA-C       Labs:       Results from last 7 days  Lab Units 01/31/19  0608 01/28/19  0541   WBC Thousand/uL 6 25 5 85   HEMOGLOBIN g/dL 11 2* 10 8*   HEMATOCRIT % 35 7 34 2*   PLATELETS Thousands/uL 181 193       Results from last 7 days  Lab Units 01/31/19  0608 01/30/19  0620   SODIUM mmol/L 136 135*   POTASSIUM mmol/L 4 4 4 5 CHLORIDE mmol/L 101 101   CO2 mmol/L 29 29   BUN mg/dL 38* 40*   CREATININE mg/dL 1 18 1 18   CALCIUM mg/dL 9 1 9 2                    [unfilled]    Labs reviewed    Physical Examination:  Vitals:   Vitals:    02/02/19 0917 02/02/19 2116 02/03/19 0517 02/03/19 0525   BP: 160/69 155/67 140/70    BP Location:  Left arm Left arm    Pulse: 60 69 60    Resp:  16 20    Temp:  98 3 °F (36 8 °C)  98 3 °F (36 8 °C)   TempSrc:  Oral Oral Oral   SpO2:  98% 97%    Weight:       Height:           HEENT:  No thrush  RESP: CTAB, no R/R/W; resp unlabored  CV: +S1 S2, regular rate, no rubs/murmurs  ABD: soft, NT, ND, normal BS   EXT: edema of LEs L>R is stable  Neuro: AAO; [place] 4/5      [ X ] Total time spent: 30 Mins and greater than 50% of this time was spent counseling/coordinating care  ** Please Note: Dragon 360 Dictation voice to text software may have been used in the creation of this document   **

## 2019-02-03 NOTE — PROGRESS NOTES
Patient was complaining of severe neck and head pain  Doctor Tele was notified  N/O to administer one time dose of Oxycodone 2 5mg PO

## 2019-02-03 NOTE — PROGRESS NOTES
02/03/19 1400   Pain Assessment   Pain Assessment 0-10   Pain Score 3   Pain Type Acute pain   Pain Location Head   Pain Descriptors Headache   Hospital Pain Intervention(s) Ambulation/increased activity; Rest   Restrictions/Precautions   Precautions Bed/chair alarms; Fall Risk;Supervision on toilet/commode;Spinal precautions   Braces or Orthoses C/S Collar   Cognition   Overall Cognitive Status WFL   Arousal/Participation Alert; Cooperative   Attention Within functional limits   Orientation Level Oriented X4   Memory Decreased short term memory   Following Commands Follows one step commands with increased time or repetition   Subjective   Subjective reports she has a HA   QI: Sit to Stand   Assistance Needed Supervision   Sit to Stand CARE Score 4   QI: Chair/Bed-to-Chair Transfer   Assistance Needed Supervision   Chair/Bed-to-Chair Transfer CARE Score 4   Transfer Bed/Chair/Wheelchair   Limitations Noted In Balance;UE Strength;LE Strength   Adaptive Equipment Roller Walker   Stand Pivot Supervision   Sit to Stand Supervision   Stand to W  R  [place], Chair, Wheelchair Transfer (FIM) 5 - Patient requires supervision/monitoring   QI: Walk 10 Feet   Assistance Needed Supervision   Walk 10 Feet CARE Score 4   QI: Walk 50 Feet with Two Turns   Assistance Needed Supervision   Walk 50 Feet with Two Turns CARE Score 4   Ambulation   Does the patient walk? 2  Yes   Primary Discharge Mode of Locomotion Walk   Walk Assist Level Supervision   Gait Pattern Inconsistant Jenny; Slow Jenny;Narrow JEFE;Step through   Assist Device Lisa Fang Walked (feet) 50 ft  (x2)   Limitations Noted In Endurance; Heel Strike;Speed;Strength;Swing   Findings to and from gym for short distance walking   Walking (FIM) 2 - Patient ambulates between 50 - 149 feet regardless of assist/device/set up   Wheelchair mobility   QI: Does the patient use a wheelchair? 0   No   Equipment Use   NuStep 15 mins BLE only level 2   Assessment Treatment Assessment pt is limited with activity due to HA but she is able to demontstrate consistent safety awareness and sequencing; movements continue to be slow but her balance with the RW is good; continue to increase strength, endurance, and balance for safe and functional mobility; continue POC as per PT   Problem List Decreased strength;Decreased range of motion;Decreased endurance; Impaired balance;Orthopedic restrictions;Decreased skin integrity;Pain   Barriers to Discharge Inaccessible home environment   PT Barriers   Functional Limitation Car transfers;Stair negotiation   Plan   Treatment/Interventions ADL retraining;Functional transfer training;LE strengthening/ROM; Elevations; Therapeutic exercise; Endurance training;Patient/family training;Equipment eval/education; Bed mobility;Gait training   Progress Progressing toward goals   Recommendation   Recommendation Outpatient PT; Home with family support   Equipment Recommended Walker   PT Therapy Minutes   PT Time In 1400   PT Time Out 1430   PT Total Time (minutes) 30   PT Mode of treatment - Individual (minutes) 0   PT Mode of treatment - Concurrent (minutes) 30   PT Mode of treatment - Group (minutes) 0   PT Mode of treatment - Co-treat (minutes) 0   PT Mode of Teatment - Total time(minutes) 30 minutes   Therapy Time missed   Time missed?  No

## 2019-02-04 PROBLEM — E87.1 HYPONATREMIA: Status: RESOLVED | Noted: 2019-01-21 | Resolved: 2019-02-04

## 2019-02-04 LAB
ANION GAP SERPL CALCULATED.3IONS-SCNC: 9 MMOL/L (ref 4–13)
BASOPHILS # BLD AUTO: 0.04 THOUSANDS/ΜL (ref 0–0.1)
BASOPHILS NFR BLD AUTO: 1 % (ref 0–1)
BUN SERPL-MCNC: 38 MG/DL (ref 5–25)
CALCIUM SERPL-MCNC: 8.7 MG/DL (ref 8.3–10.1)
CHLORIDE SERPL-SCNC: 101 MMOL/L (ref 100–108)
CO2 SERPL-SCNC: 25 MMOL/L (ref 21–32)
CREAT SERPL-MCNC: 1.18 MG/DL (ref 0.6–1.3)
EOSINOPHIL # BLD AUTO: 0.23 THOUSAND/ΜL (ref 0–0.61)
EOSINOPHIL NFR BLD AUTO: 4 % (ref 0–6)
ERYTHROCYTE [DISTWIDTH] IN BLOOD BY AUTOMATED COUNT: 13.2 % (ref 11.6–15.1)
GFR SERPL CREATININE-BSD FRML MDRD: 42 ML/MIN/1.73SQ M
GLUCOSE P FAST SERPL-MCNC: 85 MG/DL (ref 65–99)
GLUCOSE SERPL-MCNC: 85 MG/DL (ref 65–140)
HCT VFR BLD AUTO: 32.5 % (ref 34.8–46.1)
HGB BLD-MCNC: 10.3 G/DL (ref 11.5–15.4)
IMM GRANULOCYTES # BLD AUTO: 0.02 THOUSAND/UL (ref 0–0.2)
IMM GRANULOCYTES NFR BLD AUTO: 0 % (ref 0–2)
LYMPHOCYTES # BLD AUTO: 1.76 THOUSANDS/ΜL (ref 0.6–4.47)
LYMPHOCYTES NFR BLD AUTO: 33 % (ref 14–44)
MCH RBC QN AUTO: 32.4 PG (ref 26.8–34.3)
MCHC RBC AUTO-ENTMCNC: 31.7 G/DL (ref 31.4–37.4)
MCV RBC AUTO: 102 FL (ref 82–98)
MONOCYTES # BLD AUTO: 0.43 THOUSAND/ΜL (ref 0.17–1.22)
MONOCYTES NFR BLD AUTO: 8 % (ref 4–12)
NEUTROPHILS # BLD AUTO: 2.82 THOUSANDS/ΜL (ref 1.85–7.62)
NEUTS SEG NFR BLD AUTO: 54 % (ref 43–75)
NRBC BLD AUTO-RTO: 0 /100 WBCS
PLATELET # BLD AUTO: 165 THOUSANDS/UL (ref 149–390)
PMV BLD AUTO: 9.4 FL (ref 8.9–12.7)
POTASSIUM SERPL-SCNC: 4.7 MMOL/L (ref 3.5–5.3)
RBC # BLD AUTO: 3.18 MILLION/UL (ref 3.81–5.12)
SODIUM SERPL-SCNC: 135 MMOL/L (ref 136–145)
WBC # BLD AUTO: 5.3 THOUSAND/UL (ref 4.31–10.16)

## 2019-02-04 PROCEDURE — 97110 THERAPEUTIC EXERCISES: CPT

## 2019-02-04 PROCEDURE — 99232 SBSQ HOSP IP/OBS MODERATE 35: CPT | Performed by: PHYSICAL MEDICINE & REHABILITATION

## 2019-02-04 PROCEDURE — 80048 BASIC METABOLIC PNL TOTAL CA: CPT | Performed by: NURSE PRACTITIONER

## 2019-02-04 PROCEDURE — 85025 COMPLETE CBC W/AUTO DIFF WBC: CPT | Performed by: NURSE PRACTITIONER

## 2019-02-04 PROCEDURE — 97530 THERAPEUTIC ACTIVITIES: CPT

## 2019-02-04 PROCEDURE — 97116 GAIT TRAINING THERAPY: CPT

## 2019-02-04 PROCEDURE — 97535 SELF CARE MNGMENT TRAINING: CPT

## 2019-02-04 RX ORDER — OXYCODONE HYDROCHLORIDE 5 MG/1
2.5 TABLET ORAL EVERY 8 HOURS PRN
Status: DISCONTINUED | OUTPATIENT
Start: 2019-02-04 | End: 2019-02-06 | Stop reason: HOSPADM

## 2019-02-04 RX ADMIN — DOCUSATE SODIUM 100 MG: 100 CAPSULE, LIQUID FILLED ORAL at 08:53

## 2019-02-04 RX ADMIN — SERTRALINE HYDROCHLORIDE 25 MG: 25 TABLET ORAL at 21:58

## 2019-02-04 RX ADMIN — METHOCARBAMOL 500 MG: 500 TABLET, FILM COATED ORAL at 05:54

## 2019-02-04 RX ADMIN — TROLAMINE SALICYLATE 1 APPLICATION: 10 CREAM TOPICAL at 23:00

## 2019-02-04 RX ADMIN — DOCUSATE SODIUM 100 MG: 100 CAPSULE, LIQUID FILLED ORAL at 17:13

## 2019-02-04 RX ADMIN — Medication 1000 MG: at 08:53

## 2019-02-04 RX ADMIN — GABAPENTIN 500 MG: 400 CAPSULE ORAL at 17:13

## 2019-02-04 RX ADMIN — CALCIUM CARBONATE 500 MG (1,250 MG)-VITAMIN D3 200 UNIT TABLET 1 TABLET: at 08:50

## 2019-02-04 RX ADMIN — METHOCARBAMOL 500 MG: 500 TABLET, FILM COATED ORAL at 13:45

## 2019-02-04 RX ADMIN — LIDOCAINE 1 PATCH: 50 PATCH CUTANEOUS at 08:50

## 2019-02-04 RX ADMIN — LOSARTAN POTASSIUM 50 MG: 50 TABLET, FILM COATED ORAL at 21:58

## 2019-02-04 RX ADMIN — OXYCODONE HYDROCHLORIDE 2.5 MG: 5 TABLET ORAL at 09:10

## 2019-02-04 RX ADMIN — STANDARDIZED SENNA CONCENTRATE 8.6 MG: 8.6 TABLET ORAL at 21:58

## 2019-02-04 RX ADMIN — METHOCARBAMOL 500 MG: 500 TABLET, FILM COATED ORAL at 21:58

## 2019-02-04 RX ADMIN — METOPROLOL TARTRATE 25 MG: 25 TABLET, FILM COATED ORAL at 21:58

## 2019-02-04 RX ADMIN — LEVOTHYROXINE SODIUM 112 MCG: 112 TABLET ORAL at 05:54

## 2019-02-04 RX ADMIN — ACETAMINOPHEN 975 MG: 325 TABLET, FILM COATED ORAL at 21:58

## 2019-02-04 RX ADMIN — FUROSEMIDE 20 MG: 20 TABLET ORAL at 10:07

## 2019-02-04 RX ADMIN — NYSTATIN: 100000 POWDER TOPICAL at 09:03

## 2019-02-04 RX ADMIN — OXYCODONE HYDROCHLORIDE 2.5 MG: 5 TABLET ORAL at 17:14

## 2019-02-04 RX ADMIN — ACETAMINOPHEN 975 MG: 325 TABLET, FILM COATED ORAL at 05:54

## 2019-02-04 RX ADMIN — CALCIUM CARBONATE 500 MG (1,250 MG)-VITAMIN D3 200 UNIT TABLET 1 TABLET: at 17:14

## 2019-02-04 RX ADMIN — CLOPIDOGREL BISULFATE 75 MG: 75 TABLET ORAL at 08:53

## 2019-02-04 RX ADMIN — HYDROXYCHLOROQUINE SULFATE 200 MG: 200 TABLET, FILM COATED ORAL at 08:53

## 2019-02-04 RX ADMIN — GABAPENTIN 500 MG: 400 CAPSULE ORAL at 08:52

## 2019-02-04 RX ADMIN — ACETAMINOPHEN 975 MG: 325 TABLET, FILM COATED ORAL at 13:44

## 2019-02-04 RX ADMIN — NYSTATIN: 100000 POWDER TOPICAL at 17:12

## 2019-02-04 RX ADMIN — METOPROLOL TARTRATE 25 MG: 25 TABLET, FILM COATED ORAL at 10:07

## 2019-02-04 RX ADMIN — ENOXAPARIN SODIUM 40 MG: 40 INJECTION SUBCUTANEOUS at 09:00

## 2019-02-04 NOTE — PROGRESS NOTES
02/04/19 1100   Pain Assessment   Pain Assessment 0-10   Pain Type Acute pain   Pain Location Head   Restrictions/Precautions   Precautions Bed/chair alarms; Fall Risk;Impulsive   ROM Restrictions Yes   Braces or Orthoses C/S Collar   Subjective   Subjective pt states ready for PT session    QI: Roll Left and Right   Reason if not Attempted Activity not applicable   Roll Left and Right CARE Score 9   QI: Sit to Lying   Comment pt sleep in recliner at home    Reason if not Attempted Activity not applicable   Sit to Lying CARE Score 9   QI: Lying to Sitting on Side of Bed   Comment in recliner    QI: Sit to Stand   Assistance Needed Supervision   Sit to Stand CARE Score 4   Bed Mobility   Findings in recliner    QI: Chair/Bed-to-Chair Transfer   Assistance Needed Supervision   Chair/Bed-to-Chair Transfer CARE Score 4   Transfer Bed/Chair/Wheelchair   Limitations Noted In Balance; Endurance;LE Strength;UE Strength   Adaptive Equipment Roller Walker   Stand Pivot Supervision   Sit to Stand Supervision   Stand to [employer], Chair, Wheelchair Transfer (FIM) 5 - Patient requires supervision/monitoring   QI: Car Transfer   Assistance Needed Supervision   Comment needs extra time to complete task    Car Transfer CARE Score 4   QI: Walk 10 Feet   Assistance Needed Supervision   Walk 10 Feet CARE Score 4   QI: Walk 50 Feet with Two Turns   Assistance Needed Supervision   Walk 50 Feet with Two Turns CARE Score 4   Ambulation   Does the patient walk? 2  Yes   Primary Discharge Mode of Locomotion Walk   Walk Assist Level Supervision   Gait Pattern Decreased foot clearance; Inconsistant Jenny;R knee yovany   Assist Device Rollator   Distance Walked (feet) 100 ft  (x2)   Limitations Noted In Balance; Endurance; Heel Strike;Speed;Strength   Walking (FIM) 2 - Patient ambulates between 50 - 149 feet regardless of assist/device/set up   QI: Wheel 50 Feet with Two Turns   Reason if not Attempted Activity not applicable   Wheel 50 Feet with Two Turns CARE Score 9   QI: Wheel 150 Feet   Reason if not Attempted Activity not applicable   Wheel 852 Feet CARE Score 9   Wheelchair mobility   QI: Does the patient use a wheelchair? 0  No   Wheelchair (FIM) 0 - Activity does not occur   Stairs   Type Stairs   # of Steps 2   Weight Bearing Precautions Cervical;Fall Risk   Assist Devices Single Rail;Hand Hold   Stairs (FIM) 1 - Patient goes up and down less than 4 stairs regardless of assist/device/set up   QI: Toilet Transfer   Assistance Needed Supervision   Toilet Transfer CARE Score 4   Toilet Transfer   Surface Assessed Standard Toilet   Limitations Noted In Balance; Endurance   Adaptive Equipment Grab Bar   Positioning Concerns Arm Rest   Toilet Transfer (FIM) 5 - Patient requires supervision/monitoring   Therapeutic Interventions   Strengthening LAQ QS AP x20 Gluts x15 WC push ups x10    Flexibility B/LLE PROM in sitting    Balance standing w/o support for 60 sec    Equipment Use   NuStep level 2 for 15 min   Assessment   Treatment Assessment pt perform skilled PT session  this AM focus on functional activities, gait training with RW and thex ex's to inc B/L LE strengthening   pt educated on step lenght  and stride in gait pattern to dec fall risk  Pt will cont to need PT to meet D/C goals    Barriers to Discharge Inaccessible home environment   Plan   Treatment/Interventions Functional transfer training;LE strengthening/ROM; Therapeutic exercise;Elevations; Endurance training;Cognitive reorientation;Patient/family training;Bed mobility;Gait training   Progress Progressing toward goals   Recommendation   Recommendation Outpatient PT; Home with family support   Equipment Recommended Walker   PT Therapy Minutes   PT Time In 1100   PT Time Out 1130   PT Total Time (minutes) 30   PT Mode of treatment - Individual (minutes) 0   PT Mode of treatment - Concurrent (minutes) 30   PT Mode of treatment - Group (minutes) 0   PT Mode of treatment - Co-treat (minutes) 0   PT Mode of Teatment - Total time(minutes) 30 minutes   Therapy Time missed   Time missed?  No

## 2019-02-04 NOTE — SOCIAL WORK
In preparation for dc cm received order from therapy for a youth roller walker and bath seat w/back  Order placed with [facility]  Referral also made to [facility] for contd rn pt and ot services  dtr did ask for dc to be moved to Thursday due to her work schedule however pts insurance covered through 2/5 with expected dc 2/6  dtr made aware  She stated dc would be occurring late in the day then on Wednesday

## 2019-02-04 NOTE — PROGRESS NOTES
Physical Medicine and Rehabilitation Progress Note  Cliff Hutchison 80 y o  female MRN: [id number]  Unit/Bed#: -01 Encounter: [id number]    HPI: Juanita Berman a 80 y  o  female who presented to the [facility] as a trauma following a fall on Plavix  She utilizes a SPC at baseline, and had hit a wet patch on the floor causing the cane to slip from under her  Her PMH is significant for HTN with history of CVA, AVR, HTN, and T2DM  CT C-Spine revealed a Type 3 Odontoid fracture extending to the R and L superior articular facets and L transverse foramen  She also had an acute non-displaced C6 fracture  Treated non-op  Course c/b by fluid overload, hospital delirium, and  Pain  These are improving  She was evaluated by PT/OT and determined to be appropriate for discharge to the [facility] on 1/17/19  Chief Complaint: "I didn't get a good sleep last night  Interval: Patient s/e today at bedside eating breakfast  She reports that last night had difficulty sleeping due to her neighbor having guests as well as her facial pain  Also reports ear aching, but denies popping, congestion, difficulty hearing  She states she occasionally has her ears cleaned by her primary care physician and is due  She denies any UR symptoms  No new numbness/tingling/weakness  Otherwise no acute events  ROS:  Negative except for what is noted in HPI/CC/Sbuj    Assessment/Plan:      * Ambulatory dysfunction   Assessment & Plan    Patient will participate in a comprehensive inpatient rehab program with 3-5 hours a day 5-7 days a week of PT/OT  To evaluate for any further SLP needs  - Continue pain management  - Fall precautions     C6 cervical fracture (HCC)   Assessment & Plan    Non-op management  - Pain control as noted above in Type III odontoid fracture  - PT/OT     Type III fracture of odontoid process [facility])   Assessment & Plan    Please see ambulatory dysfunction    Follow-up X-rays  x2 1/28: some increased anterior displacement compared with 1/11 films  Neuro exam stable   - C-Collar ATC   - Cervical precautions  - Discussed with Neurosurgery Initial films on 1/28 were with patient bent forward, exaggerating displacement  Repeat XR done appeared stable  - Plan for follow-up with Neurosurgery 2 weeks with another XR of cervical spine  Lesion of left lung   Assessment & Plan    CT C/A/P - 1 cm groundglass attenuation density in the right lower lobe  Suggest reassessment with repeat chest CT in 3-6 months  Pain   Assessment & Plan    Improving neck and associated myofascial pain improving  - Continue scheduled APAP  - Adjust Robaxin 500mg TID for muscle spasms but limit impact on sleep with QID dosing  - Gabapentin 500mg BID scheduled for adjuvant  Pain control and chronic Fibro  Can increase if needed to up to 700mg BID    - Decreased oxycodone 2 5mg to Q8hr PRN - patient titrating down appropriately  - Lidocaine patch     History of aortic valve replacement with bioprosthetic valve   Assessment & Plan    Monitor cardiopulmonary status  IM to manage  - Cath without serious cardiac dz prior to her surgery  Fibromyalgia   Assessment & Plan    Continue sertraline  Continue gabapentin to 500mg BID  Consult neuropsychology for adjustment and coping skills for pain management  Rheumatoid arthritis involving multiple sites [facility])   Assessment & Plan    Continue plaquenil, and pain mgmt as outlined below  - Monitor for flare  - IM following     Stage 3 chronic kidney disease ([address] )   Assessment & Plan    Had an episode of HERLINDA  Now resolved with stable creatinine at 1 18    - Baseline crea ~ 1    - Nephro following, recs appreciated  Continue to monitor function on Lasix  - Renally dosed medications  - Avoiding nephrotoxic medications     - Nephrology has signed off and will follow-up with her at discharge  Depression   Assessment & Plan    Continue sertraline        Type 2 diabetes mellitus with diabetic polyneuropathy [facility])   Assessment & Plan    Lab Results   Component Value Date    HGBA1C 5 2 01/18/2019       No results for input(s): POCGLU in the last 72 hours  Blood Sugar Average: Last 72 hrs:  - Monitor fasting glucose  - Continue diabetic diet  - At home diet controlled  - Placed on diabetic diet  - IM to manage     History of CVA (cerebrovascular accident)   Assessment & Plan    Old R BG stroke appreciated on recent CT head   Cannot tolerate statins  Continue Plavix  Continue fish oil  At home on red yeast rice as well  Chronic diastolic heart failure (HCC)   Assessment & Plan    Continue lasix 20mg qday per nephro/IM  Monitor kidney function on lasix   Continue ARB/BB     Renovascular hypertension   Assessment & Plan    Mgmt per IM/Nephro  Amlodipine 5mg  Goal <130/80 but not by much per Nephrology  Losartan 50mg  Continue metoprolol tartrate 25mg Q12  Continue Lasix 20mg daily  S/p stenting for DONAVAN       Hyponatremiaresolved as of 2/4/2019   Assessment & Plan    Resolved  - Patient continuing Fluid restriction   - Sodium tabs discontinued  - Resolved with most recent Na on 1/31/18 136 < 135 <137  Imaging per prior providers:  - CTCAP to rule out other causes - unremarkable except for possible pulmonary nodule  - CTH unremarkable except for old, stable lacunar infarct  - Remainder of work-up unrevealing    - Restarted on furosemide  - Will monitor sodium periodically  # Skin  · Encourage regular turning as patient at risk for skin breakdown  · Staff to continue patient education on Q2h turning  · Maceration on labia and crural folds with hydraguard and ABD pads      # Bowel  · Patient reports no constipation  · last BM 2/3/19  Colace, Senna, Miralax      # Bladder  · Patient voiding spontaneously    # Pain  · Continue tylenol, for max of 3gm daily      · Continue oxycodone   · Continue methocarbamol  · Continue gabapentin  · Robaxin TID, Gabapentin increased to 500mg BID  # Rehab Psych   · referral to Rehabilitation Psychology    # Other  - Diet/Nutrition:        Diet Orders            Start     Ordered    01/24/19 1248  Diet Regular; Regular House; Fluid Restriction 1200 ML  Diet effective now     Question Answer Comment   Diet Type Regular    Regular Regular House    Other Restriction(s): Fluid Restriction 1200 ML    RD to adjust diet per protocol? Yes        01/24/19 1247    01/18/19 0721  Room Service  Once     Question:  Type of Service  Answer:  Room Service - Appropriate with Assistance    01/18/19 0720    01/17/19 1654  Dietary nutrition supplements  Once     Question Answer Comment   Select Supplement: Ensure Enlive-Vanilla    Frequency Lunch        01/17/19 1653        - DVT prophy: Sequential compression device (Venodyne)  and Enoxaparin (Lovenox)  - GI ppx: None  - Nausea: None  - Supplements: None  - Sleep: None    Disposition: Team 1/30/19 Making slow progress  Has decrease tolerance/endurance, UE strength/ROM, memory  Goal is to get patient to a Supervision level with ADLs/transfers/mobility with LRAD  She is making progress  Plan for discharge on 2/6/19       CODE: Level 1: Full Code   Scheduled Meds:    Current Facility-Administered Medications:  acetaminophen 975 mg Oral [facility] Kellen Downing MD   albuterol 2 puff Inhalation Q4H PRN Kellen Downing MD   amLODIPine 5 mg Oral Daily Suhail Baxter MD   barium sulfate 450 mL Oral Once in imaging Suhail Baxter MD   calcium carbonate-vitamin D 1 tablet Oral BID With Meals Kellen Downing MD   clopidogrel 75 mg Oral Daily Kellen Downing MD   docusate sodium 100 mg Oral BID Kellen Downing MD   enoxaparin 40 mg Subcutaneous Q24H [address] Kellen Downing MD   fish oil 1,000 mg Oral Daily Kellen Downing MD   furosemide 20 mg Oral Daily Jossie Munson MD   gabapentin 500 mg Oral BID Kellen Downing MD   hydroxychloroquine 200 mg Oral Daily With Breakfast Kellen Downing MD   levothyroxine 112 mcg Oral Early Morning Anthony Steele MD   lidocaine 1 patch Topical Daily Anthony Steele MD   lidocaine 1 patch Topical Daily Anthony Steele MD   losartan 50 mg Oral HS Bethany Marshall MD   methocarbamol 500 mg Oral TID Rah Gurrola MD   metoprolol tartrate 25 mg Oral Q12H [address] Anthony Steele MD   nystatin  Topical BID Anthony Steele MD   oxyCODONE 2 5 mg Oral Q8H PRN Anthony Steele MD   polyethylene glycol 17 g Oral Daily PRN Anthony Steele MD   senna 1 tablet Oral HS Anthony Steele MD   sertraline 25 mg Oral Daily Anthony Steele MD   trolamine salicylate 1 application Topical 4x Daily PRN Ewelina Perales PA-C        Objective:    Functional Update:   2/3/19 OT: Mayda Pepper toileting hygiene, toileting  2/3/19 PT: S transfers, ambulation ' with RW,     Allergies per EMR    Physical Exam:  Temp:  [97 9 °F (36 6 °C)-98 8 °F (37 1 °C)] 98 2 °F (36 8 °C)  HR:  [55-67] 55  Resp:  [18-20] 20  BP: (110-158)/(55-75) 110/64  SpO2:  [93 %-95 %] 95 %    General: alert, no apparent distress, cooperative and comfortable  HEENT:  Head: Normocephalic, no lesions, without obvious abnormality  Ears: Limited due to equipment, but TM appear clear, no erythema in her external canal, no pain/tenderness when pulling pinna  Has minimal cerumen in R ear > L ear  C-collar in place  No evidence of pressure injury at chin/jawline/occiput  CARDIAC:  regular rate and rhythm, S1, S2 normal, no murmur, click, rub or gallop  LUNGS:  normal air entry, lungs clear to auscultation  ABDOMEN:  soft, non-tender  Bowel sounds normal  No masses, no organomegaly  EXTREMITIES:  Improved BL LE edema  wearing home NABIL stockings  NEURO:   normal without focal findings, mental status, speech normal, alert and oriented x3 and Strength is >4/5 throughout  [facility]:  Normal  and Alert and oriented, appropriate affect       Diagnostic Studies: Reviewed  XR follow up   Final Result by Jayson Gomez MD (01/29 1848)      Anterior displacement at the odontoid fracture is unchanged from exam earlier in the same day, but increased from January 11  Unchanged alignment at C6 anterior syndesmophyte fracture  Workstation performed: [id number]         XR spine cervical 2 or 3 vw injury   Final Result by Vera Ace MD (01/28 1142)      Increased anterior displacement of odontoid fracture  Workstation performed: [id number]         CT chest abdomen pelvis wo contrast   Final Result by Pj Bullock MD (01/25 1954)      No suspicious appearing masses are seen  There is an approximately 1 cm groundglass attenuation density in the right lower lobe  Suggest reassessment with repeat chest CT in 3-6 months  Relatively mild colonic diverticulosis  Workstation performed: [id number]         CT head wo contrast   Final Result by Pj Bullock MD (01/25 1943)      No acute intracranial abnormality  Moderate chronic microvascular ischemic change in periventricular white matter and cerebral atrophy  Workstation performed: [id number]         XR chest pa & lateral   Final Result by Tacos Romero DO (01/25 8334)   No acute cardiopulmonary disease              Workstation performed: [id number]             Laboratory: Reviewed    Results from last 7 days  Lab Units 02/04/19  0625 01/31/19  0608   HEMOGLOBIN g/dL 10 3* 11 2*   HEMATOCRIT % 32 5* 35 7   WBC Thousand/uL 5 30 6 25       Results from last 7 days  Lab Units 02/04/19  0625 01/31/19  0608 01/30/19  0620   BUN mg/dL 38* 38* 40*   SODIUM mmol/L 135* 136 135*   POTASSIUM mmol/L 4 7 4 4 4 5   CHLORIDE mmol/L 101 101 101   CREATININE mg/dL 1 18 1 18 1 18            Patient Active Problem List   Diagnosis    Closed nondisplaced fracture of second cervical vertebra (HCC)    Neck pain, acute    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)    C6 cervical fracture (HCC)    Hypertension    Hypothyroidism    Fall    Forgetfulness    Ambulatory dysfunction    Physical deconditioning    Acute pain    Delirium    Renovascular hypertension    Chronic diastolic heart failure (HCC)    History of CVA (cerebrovascular accident)    Type 2 diabetes mellitus with diabetic polyneuropathy (HCC)    Depression    Stage 3 chronic kidney disease (HCC)    [facility]    Fibromyalgia    History of aortic valve replacement with bioprosthetic valve    Hyponatremia    Renal artery stenosis (HCC)    Parenchymal renal hypertension    Pain    Lesion of left lung       ** Please Note: Fluency Direct voice to text software may have been used in the creation of this document  **    Total visit time:  At least 25 minutes, with more than 50% spent counseling/coordinating care

## 2019-02-04 NOTE — PROGRESS NOTES
02/04/19 0700   Pain Assessment   Pain Assessment 0-10   Pain Score 4   Pain Type Acute pain   Pain Location Head   Pain Orientation Right   Pain Frequency Intermittent   Hospital Pain Intervention(s) Distraction; Emotional support; Environmental changes   Restrictions/Precautions   Precautions Bed/chair alarms; Fall Risk;Spinal precautions;Supervision on toilet/commode   Weight Bearing Restrictions No   ROM Restrictions Yes   Braces or Orthoses C/S Collar   QI: Eating   Assistance Needed Independent   Assistance Provided by [place] No physical assistance   Eating CARE Score 6   Eating Assessment   Positioning Upright;Out of Bed   Meal Assessed Breakfast   Eating (FIM) 6 - Patient requires increased time or safety concern   QI: Oral Hygiene   Comment Unable to complete 2* to time constraints  Grooming   Able To Initiate Tasks;Comb/Brush Hair;Wash/Dry Face;Brush/Clean Teeth;Wash/Dry Hands   Limitation Noted In Coordination;Timeliness; Safety;Strength   Findings Pt requires assistance with combing hair 2* to limted UE ROM  Grooming (FIM) 4 - Patient completed 3/4  tasks   QI: Shower/Bathe Self   Assistance Needed Supervision   Assistance Provided by [place] No physical assistance   Comment Pt in stance using grab bars with unilateral UE support to complete rear and loreto bathing  Pt demos G safety awareness during stance  Pt utilizes LHR to complete foot and bilateral lower leg bathing  Shower/Bathe Self CARE Score 4   Bathing   Assessed Bath Style Shower   Anticipated D/C Bath Style Shower   Able to [place] No   Able to [employer] Temperature Yes   Able to Wash/Rinse/Dry (body part) Left Arm;Right Arm;L Upper Leg;R Upper Leg;L Lower Leg/Foot;R Lower Leg/Foot;Chest;Abdomen;Perineal Area; Buttocks   Limitations Noted in Balance; Endurance;ROM;Strength;Timeliness   Positioning Seated;Standing   Adaptive Equipment Longhand Reacher; Shower Seat;Shower [employer]   Findings  Pt requires supervision when in stance 2* decreased standing balance  Pt demos increased safety awareness by maintaining unilateral UE support on the grab bars while in stance   Bathing (FIM) 5 - Patient requires supervision/monitoring but completes 10/10 parts   Tub/Shower Transfer   Limitations Noted In Balance; Endurance;UE Strength;LE Strength; Safety   Adaptive Equipment Grab Bars;Seat with Back   Assessed Shower   Findings Pt requires supervision during transfer 2* decreased standing balance   Shower Transfer (FIM) 5 - Patient requires supervision/monitoring   QI: Upper Body Dressing   Assistance Needed Supervision   Assistance Provided by [place] No physical assistance   Comment Pt able to complete UB dressing with supervision but requires increased time to pull shirt down 2* decreased ROM and orthopedic restrictions  Pt continues to require total A for donning/doffing C collar  Pt's daughter and son in law will complete this portion at home 2* to pt's decreased ROM and safety  Upper Body Dressing CARE Score 4   QI: Lower Body Dressing   Assistance Needed Incidental touching; Adaptive equipment   Assistance Provided by [place] No physical assistance   Comment Pt requires steading when pulling up pants over hips  Pt uses LHR to pull pants over feet and lower leg  Lower Body Dressing CARE Score 4   QI: Putting On/Taking Off Footwear   Comment Unable to assess this session 2* to time constraints  QI: Picking Up Object   Assistance Needed Supervision; Adaptive equipment   Assistance Provided by [place] No physical assistance   Comment Pt uses LHR to  socks off floor   Picking Up Object CARE Score 4   [facility]Undressing [facility]; Other  [facility])   Remove LB [address] UB Clothes Undershirt;Pullover [address] LB Clothes Pants; Undergarment;Socks; Shoes   Limitations Noted In Balance; Endurance;Strength;ROM; Timeliness   Adaptive Equipment Reacher;Dressing Stick   Positioning Supported Sit;Standing   Findings Pt uses dressing stick to remove hospital socks when getting in shower  2* to time constraints pt unable to complete LB dressing  UB Dressing (FIM) 5 - Patient requires supervision/monitoring   LB Dressing (FIM) 4 - Patient requires steadying assist or light touching   QI: Sit to [address] Provided by [place] No physical assistance   Comment Pt requires no boosting but continues to require supervision 2* decreased standing balance   Sit to Stand CARE Score 4   QI: Chair/Bed-to-Chair Transfer   Assistance Needed Supervision   Assistance Provided by [place] No physical assistance   Comment Pt requires no boosting but continues to require supervision due to decreased standing balance   Chair/Bed-to-Chair Transfer CARE Score 4   Transfer Bed/Chair/Wheelchair   Limitations Noted In Balance; Endurance;UE Strength;LE Strength   Adaptive Equipment Roller Walker   Sit to [facility] [place]   Stand to Sit Supervision   Findings Pt continues to demo increased safety awareness with transfer while using RW by pushing from w/c or chair instead of grabbing for RW  Bed, Chair, Wheelchair Transfer (FIM) 5 - Patient requires supervision/monitoring   QI: [address] Provided by [place] No physical assistance   Comment Pt able to complete pants down, hygiene, and pants up but continues to be supervision 2* decreased standing balance   Toileting Hygiene CARE Score 4   Toileting   Able to [address] down yes, up yes     Manage Hygiene Bladder   Limitations Noted In Balance;ROM;LE Strength   Adaptive Equipment Grab Bar   Toileting (FIM) 5 - Patient requires supervision/monitoring   QI: Toilet Transfer   Assistance Needed Supervision   Assistance Provided by [place] No physical assistance   Toilet Transfer CARE Score 4   Toilet Transfer   Surface Assessed Standard Toilet   Transfer Technique Standard   Limitations Noted In Balance; Endurance;LE Strength;UE Strength; Safety   Adaptive Equipment Grab Bar   Findings Pt francesca MIRZA carryover of safety during functional transfer  Toilet Transfer (FIM) 5 - Patient requires supervision/monitoring   Cognition   Overall Cognitive Status WFL   Arousal/Participation Alert; Cooperative   Attention Within functional limits   Orientation Level Oriented X4   Memory Decreased short term memory   Following Commands Follows one step commands with increased time or repetition   Activity Tolerance   Activity Tolerance Patient tolerated treatment well   Assessment   Treatment Assessment Pt participated in skilled OT services to focus on ADL retraining and functional transfers  See above for details on bathing and dressing  Pt francesca MIRZA safety awareness with RW needing no VC's for hand placement and staying inside of the walker this session  Pt francesca MIRZA compliance of spinal precautions and demos understanding when dressing lower body stating " I can't bend that far"  Pt continues to be limited by decreased standing tolerance/balance, decreased endurance, and orthopedic restriction which limits her ability to complete I/ADLs independently  Pt educated to complete rear/loreto hygiene by patting area instead of wiping area to prevent shearing  Notified nursing when completed with bathing tasks so that they could address wounds  Refer to their note for details  Pt would continue to benefit from skilled OT services to focus on standing tolerance/balance, endurance, functional transfers, and family training  Prognosis Good   Problem List Decreased strength;Decreased range of motion;Decreased endurance; Impaired balance;Decreased mobility; Decreased cognition;Decreased safety awareness;Orthopedic restrictions;Pain   Plan   Treatment/Interventions ADL retraining;Functional transfer training; Therapeutic exercise; Endurance training;Patient/family training; Compensatory technique education   Progress Progressing toward goals Recommendation   OT Discharge Recommendation Home OT   OT Equipment ordered Youth RW, shower chair with back  Date ordered 02/04/19   OT Therapy Minutes   OT Time In 0700   OT Time Out 0830   OT Total Time (minutes) 90   OT Mode of treatment - Individual (minutes) 90   OT Mode of treatment - Concurrent (minutes) 0   OT Mode of treatment - Group (minutes) 0   OT Mode of treatment - Co-treat (minutes) 0   OT Mode of Teatment - Total time(minutes) 90 minutes   Therapy Time missed   Time missed?  No

## 2019-02-04 NOTE — PROGRESS NOTES
02/04/19 1400   Pain Assessment   Pain Assessment 0-10   Pain Score 4   Pain Type Acute pain   Pain Location Head   Restrictions/Precautions   Precautions Bed/chair alarms; Fall Risk;Impulsive   ROM Restrictions Yes   Braces or Orthoses C/S Collar   Subjective   Subjective pt agreeable to perform PT session    QI: Sit to Stand   Assistance Needed Set-up / clean-up   Sit to Stand CARE Score 5   Bed Mobility   Findings pt sleeps in recliner and will sleep at her daughter house in her recliner post d/c    QI: Chair/Bed-to-Chair Transfer   Assistance Needed Set-up / clean-up   Chair/Bed-to-Chair Transfer CARE Score 5   Transfer Bed/Chair/Wheelchair   Limitations Noted In Endurance;Balance   Adaptive Equipment Roller Walker   Stand Pivot Supervision   Sit to Stand Supervision   Stand to [facility] 146, Wheelchair Transfer (FIM) 5 - Patient requires supervision/monitoring   QI: Car Transfer   Assistance Needed Verbal cues; Incidental touching   Car Transfer CARE Score 4   QI: Walk 10 Feet   Assistance Needed Set-up / clean-up   Walk 10 Feet CARE Score 5   QI: Walk 50 Feet with Two Turns   Assistance Needed Set-up / clean-up   Walk 50 Feet with Two Turns CARE Score 5   QI: Walk 150 Feet   Assistance Needed Set-up / clean-up   Walk 150 Feet CARE Score 5   Ambulation   Does the patient walk? 2  Yes   Primary Discharge Mode of Locomotion Walk   Walk Assist Level Supervision   Gait Pattern Decreased foot clearance; Inconsistant Jenny; Improper weight shift; Wide JEFE   Assist Device Lisa Kerline Leoncio Walked (feet) 150 ft   Limitations Noted In Balance; Endurance; Heel Strike; Sequencing;Speed;Strength;Swing   Findings short distance with 50 feet with RW for S level    Walking (FIM) 5 - Patient requires supervision/monitoring AND distance 150 feet or more, no rest   Wheelchair mobility   QI: Does the patient use a wheelchair? 0   No   QI: 1 Step (Curb)   Assistance Needed Incidental touching   1 Step (Curb) CARE Score 4   QI: 4 Steps   Assistance Needed Incidental touching   Assistance Provided by Tracy Less than 25%   4 Steps CARE Score 3   Stairs   Type Stairs;Curb   # of Steps 4   Assist Devices Single Rail;Hand Hold   Findings instructed HHA and single HR    Stairs (FIM) 2 - Patient goes up and down 4 - 11 stairs regardless of assist/device/setup   Therapeutic Interventions   Strengthening LAQ AP QS GLUTS X15     Flexibility Hamstring and calf stretch in sitting    Balance standing balance NBOS with EC/EO    Equipment Use   GenJuice level 2 for 15 min    Assessment   Treatment Assessment pt perform thex ex's functional activities and gait mobility to her tolerance   Pt perform ascending and descending  steps 6 inch single HR needs extra time to perform task at [facility] , to [address] when fatigue   Pt perform thex ex's for B/L LE strengthening and mobility   pt also demo/ good safety awareness  with RW needing no VC for hand placement  Pt has difficulty with transfers chey bending her B/L LEGS into the car need Mark getting in car but not out  Pt will perform car transfers [place] 2/5/19 with her daughter car at 1 pm for trail before D/C this week  Pt will cont to need skilled PT to meet goals    Problem List Decreased strength;Decreased endurance;Decreased mobility; Decreased coordination;Decreased cognition;Decreased safety awareness   Barriers to Discharge Inaccessible home environment   Plan   Treatment/Interventions Functional transfer training;LE strengthening/ROM; Elevations; Therapeutic exercise; Endurance training;Cognitive reorientation;Patient/family training;Gait training   Progress Progressing toward goals   PT Therapy Minutes   PT Time In 1400   PT Time Out 1500   PT Total Time (minutes) 60   PT Mode of treatment - Individual (minutes) 60   PT Mode of treatment - Concurrent (minutes) 0   PT Mode of treatment - Group (minutes) 0   PT Mode of treatment - Co-treat (minutes) 0   PT Mode of Teatment - Total time(minutes) 60 minutes   Therapy Time missed   Time missed?  No

## 2019-02-04 NOTE — PROGRESS NOTES
Internal Medicine Progress Note  Patient: Dorothy Rajan  Age/sex: 80 y o  female  Medical Record #: [id number]      ASSESSMENT/PLAN:  Dorothy Rajan is seen and examined and management for following issues:    Type III odontoid fracture with fracture line extending to the right/left superior articular facets and left transverse foramen of C2; nondisplaced fracture through bulky, bridging osteophytes at the C6 level:  required no surgery  Continue collar  Facial pain she describes is intermitt and in front of ears and is bilateral, mostly R/T activity  Continue Neurontin     HTN with hx right RA stent: per renal = acceptable on Norvasc 5 mg qd, Cozaar 50mg qd, Lopressor 25mg q12hrs    Hyponatremia:  Resolved/stable; renal did follow and have signed off  Torsemide was switched back to Lasix 20 mg qd as at home; on FR 1200 and will liberalize to 1500 today  Renal ordered CXR 1/24 (negative) and CT C/A/P 1/25 negative for etiology for hyponatremia  Ground-glass attenuation RLL lower lung base: For repeat CT chest 3-6 months        CKD; baseline 1 0-1 1:  baseline per renal and stable currently     Hx bioAVR 2012: had no significant disease when had card cath before surgery     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 2016:  continue Plavix that she has been on since CVA 2005; with her TIA 2016, neuro kept Plavix; she is intol statins and uses Red yeast rice and Fish oil at home     DM diet controlled:   watching fasting blood sugars  Stable      Chronic diastolic CHF, LVEF 22%, mild-mod MR/mild TR and norm function AVR 2016:  on Lasix 20mg qd as at home  Follows with Dr Gracie Mcelroy  Family says LE edema is less than at home    CXR 1/24 was negative for CHF     RA/SLE; chr pain/fibromyalgia:  continue Plaquenil for RA; on Hydrocodone at home for chr pain      Hypothyroidism:  continue current Levothyroxine     Depression: Zoloft     Hx breast cancer: follows with Fidel Newby      Subjective:  denies any complaints currently     ROS:   GI: denies abdominal pain, change bowel habits or reflux symptoms  Neuro: +Right facial pain  Respiratory: No Cough, SOB  Cardiovascular: No CP, palpitations     Scheduled Meds:    Current Facility-Administered Medications:  acetaminophen 975 mg Oral [facility] Danielle Champagne MD   albuterol 2 puff Inhalation Q4H PRN Danielle Champagne MD   amLODIPine 5 mg Oral Daily Mary Dupree MD   barium sulfate 450 mL Oral Once in imaging Mary Dupree MD   calcium carbonate-vitamin D 1 tablet Oral BID With Meals Danielle Champagne MD   clopidogrel 75 mg Oral Daily Danielle Champagne MD   docusate sodium 100 mg Oral BID Danielle Champagne MD   enoxaparin 40 mg Subcutaneous Q24H [address] Danielle Champagne MD   fish oil 1,000 mg Oral Daily Danielle Champagne, MD   furosemide 20 mg Oral Daily Maria A Barriga MD   gabapentin 500 mg Oral BID Danielle Champagne MD   hydroxychloroquine 200 mg Oral Daily With Breakfast Danielle Champagne MD   levothyroxine 112 mcg Oral Early Morning Danielle Champagne MD   lidocaine 1 patch Topical Daily Danielle Champagne, MD   lidocaine 1 patch Topical Daily Danielle Champagne, MD   losartan 50 mg Oral HS Mary Dupree MD   methocarbamol 500 mg Oral TID Garrett Hem, MD   metoprolol tartrate 25 mg Oral Q12H [address] Danielle Champagne MD   nystatin  Topical BID Danielle Champagne, MD   oxyCODONE 2 5 mg Oral Q8H PRN Danielle Champagne MD   polyethylene glycol 17 g Oral Daily PRN Danielle Champagne MD   senna 1 tablet Oral HS Danielle Champagne, MD   sertraline 25 mg Oral Daily Danielle Champagne MD   trolamine salicylate 1 application Topical 4x Daily PRN Ewelina Perales PA-C       Labs:       Results from last 7 days  Lab Units 02/04/19  0625 01/31/19  0608   WBC Thousand/uL 5 30 6 25   HEMOGLOBIN g/dL 10 3* 11 2*   HEMATOCRIT % 32 5* 35 7   PLATELETS Thousands/uL 165 181       Results from last 7 days  Lab Units 02/04/19  0625 01/31/19  0608   SODIUM mmol/L 135* 136   POTASSIUM mmol/L 4 7 4 4   CHLORIDE mmol/L 101 101   CO2 mmol/L 25 29   BUN mg/dL 38* 38*   CREATININE mg/dL 1 18 1 18   CALCIUM mg/dL 8 7 9 1                    [unfilled]    Labs reviewed    Physical Examination:  Vitals:   Vitals:    02/03/19 2031 02/04/19 0700 02/04/19 0859 02/04/19 1005   BP: 128/55 110/58 110/64 126/56   BP Location: Left arm  Left arm Left arm   Pulse: 63 55     Resp: 18 20     Temp: 97 9 °F (36 6 °C) 98 2 °F (36 8 °C)     TempSrc: Oral Oral     SpO2: 95% 95%     Weight:       Height:           HEENT:  No thrush  RESP: CTAB, no R/R/W; resp unlabored  CV: +S1 S2, regular rate, no rubs/murmurs  ABD: soft, NT, ND, normal BS   EXT: edema of LEs L>R is stable  Neuro: AAO; [place] 4/5      [ X ] Total time spent: 30 Mins and greater than 50% of this time was spent counseling/coordinating care  ** Please Note: Dragon 360 Dictation voice to text software may have been used in the creation of this document   **

## 2019-02-05 PROBLEM — L30.8 DERMATITIS ASSOCIATED WITH MOISTURE: Status: ACTIVE | Noted: 2019-02-05

## 2019-02-05 PROBLEM — M79.10 TRIGGER POINT: Status: ACTIVE | Noted: 2019-02-05

## 2019-02-05 PROBLEM — R41.0 DELIRIUM: Status: RESOLVED | Noted: 2019-01-11 | Resolved: 2019-02-05

## 2019-02-05 PROBLEM — R68.89 FORGETFULNESS: Status: RESOLVED | Noted: 2019-01-10 | Resolved: 2019-02-05

## 2019-02-05 PROBLEM — R53.81 PHYSICAL DECONDITIONING: Status: RESOLVED | Noted: 2019-01-10 | Resolved: 2019-02-05

## 2019-02-05 PROCEDURE — 3E023BZ INTRODUCTION OF ANESTHETIC AGENT INTO MUSCLE, PERCUTANEOUS APPROACH: ICD-10-PCS | Performed by: PHYSICAL MEDICINE & REHABILITATION

## 2019-02-05 PROCEDURE — 97535 SELF CARE MNGMENT TRAINING: CPT

## 2019-02-05 PROCEDURE — 99233 SBSQ HOSP IP/OBS HIGH 50: CPT | Performed by: PHYSICAL MEDICINE & REHABILITATION

## 2019-02-05 PROCEDURE — 97530 THERAPEUTIC ACTIVITIES: CPT

## 2019-02-05 PROCEDURE — 97110 THERAPEUTIC EXERCISES: CPT

## 2019-02-05 PROCEDURE — 97116 GAIT TRAINING THERAPY: CPT

## 2019-02-05 RX ORDER — METHOCARBAMOL 500 MG/1
500 TABLET, FILM COATED ORAL 4 TIMES DAILY PRN
Qty: 60 TABLET | Refills: 0 | Status: SHIPPED | OUTPATIENT
Start: 2019-02-05 | End: 2019-03-15 | Stop reason: SDUPTHER

## 2019-02-05 RX ORDER — GABAPENTIN 300 MG/1
600 CAPSULE ORAL 2 TIMES DAILY
Status: DISCONTINUED | OUTPATIENT
Start: 2019-02-05 | End: 2019-02-06 | Stop reason: HOSPADM

## 2019-02-05 RX ORDER — SENNOSIDES 8.6 MG
1 TABLET ORAL
Qty: 30 EACH | Refills: 0 | Status: SHIPPED | OUTPATIENT
Start: 2019-02-05 | End: 2019-02-19

## 2019-02-05 RX ORDER — LIDOCAINE HYDROCHLORIDE 5 MG/ML
5 INJECTION, SOLUTION INFILTRATION; INTRAVENOUS ONCE
Status: DISCONTINUED | OUTPATIENT
Start: 2019-02-05 | End: 2019-02-05

## 2019-02-05 RX ORDER — LOSARTAN POTASSIUM 100 MG/1
50 TABLET ORAL DAILY
Qty: 15 TABLET | Refills: 0
Start: 2019-02-05 | End: 2019-08-20 | Stop reason: HOSPADM

## 2019-02-05 RX ORDER — LIDOCAINE HYDROCHLORIDE 5 MG/ML
10 INJECTION, SOLUTION INFILTRATION; INTRAVENOUS ONCE
Status: COMPLETED | OUTPATIENT
Start: 2019-02-05 | End: 2019-02-05

## 2019-02-05 RX ORDER — GABAPENTIN 300 MG/1
600 CAPSULE ORAL 2 TIMES DAILY
Qty: 30 CAPSULE | Refills: 0 | Status: SHIPPED | OUTPATIENT
Start: 2019-02-05 | End: 2019-05-03 | Stop reason: HOSPADM

## 2019-02-05 RX ORDER — DOCUSATE SODIUM 100 MG/1
100 CAPSULE, LIQUID FILLED ORAL 2 TIMES DAILY PRN
Qty: 30 CAPSULE | Refills: 0 | Status: SHIPPED | OUTPATIENT
Start: 2019-02-05 | End: 2019-09-24 | Stop reason: ALTCHOICE

## 2019-02-05 RX ORDER — BUPIVACAINE HYDROCHLORIDE 2.5 MG/ML
5 INJECTION, SOLUTION EPIDURAL; INFILTRATION; INTRACAUDAL ONCE
Status: DISCONTINUED | OUTPATIENT
Start: 2019-02-05 | End: 2019-02-05

## 2019-02-05 RX ADMIN — CLOPIDOGREL BISULFATE 75 MG: 75 TABLET ORAL at 09:06

## 2019-02-05 RX ADMIN — Medication 1000 MG: at 09:13

## 2019-02-05 RX ADMIN — CALCIUM CARBONATE 500 MG (1,250 MG)-VITAMIN D3 200 UNIT TABLET 1 TABLET: at 09:07

## 2019-02-05 RX ADMIN — ENOXAPARIN SODIUM 40 MG: 40 INJECTION SUBCUTANEOUS at 09:07

## 2019-02-05 RX ADMIN — LOSARTAN POTASSIUM 50 MG: 50 TABLET, FILM COATED ORAL at 21:09

## 2019-02-05 RX ADMIN — GABAPENTIN 500 MG: 400 CAPSULE ORAL at 09:06

## 2019-02-05 RX ADMIN — LIDOCAINE 1 PATCH: 50 PATCH CUTANEOUS at 09:13

## 2019-02-05 RX ADMIN — GABAPENTIN 600 MG: 300 CAPSULE ORAL at 17:06

## 2019-02-05 RX ADMIN — CALCIUM CARBONATE 500 MG (1,250 MG)-VITAMIN D3 200 UNIT TABLET 1 TABLET: at 17:06

## 2019-02-05 RX ADMIN — LIDOCAINE HYDROCHLORIDE 10 ML: 5 INJECTION, SOLUTION INFILTRATION at 15:39

## 2019-02-05 RX ADMIN — LEVOTHYROXINE SODIUM 112 MCG: 112 TABLET ORAL at 06:17

## 2019-02-05 RX ADMIN — HYDROXYCHLOROQUINE SULFATE 200 MG: 200 TABLET, FILM COATED ORAL at 09:13

## 2019-02-05 RX ADMIN — METHOCARBAMOL 500 MG: 500 TABLET, FILM COATED ORAL at 13:43

## 2019-02-05 RX ADMIN — FUROSEMIDE 20 MG: 20 TABLET ORAL at 09:07

## 2019-02-05 RX ADMIN — NYSTATIN: 100000 POWDER TOPICAL at 17:06

## 2019-02-05 RX ADMIN — OXYCODONE HYDROCHLORIDE 2.5 MG: 5 TABLET ORAL at 09:17

## 2019-02-05 RX ADMIN — METOPROLOL TARTRATE 25 MG: 25 TABLET, FILM COATED ORAL at 21:09

## 2019-02-05 RX ADMIN — NYSTATIN: 100000 POWDER TOPICAL at 09:18

## 2019-02-05 RX ADMIN — DOCUSATE SODIUM 100 MG: 100 CAPSULE, LIQUID FILLED ORAL at 17:06

## 2019-02-05 RX ADMIN — ACETAMINOPHEN 975 MG: 325 TABLET, FILM COATED ORAL at 21:09

## 2019-02-05 RX ADMIN — METOPROLOL TARTRATE 25 MG: 25 TABLET, FILM COATED ORAL at 09:17

## 2019-02-05 RX ADMIN — ACETAMINOPHEN 975 MG: 325 TABLET, FILM COATED ORAL at 13:43

## 2019-02-05 RX ADMIN — AMLODIPINE BESYLATE 5 MG: 5 TABLET ORAL at 09:18

## 2019-02-05 RX ADMIN — DOCUSATE SODIUM 100 MG: 100 CAPSULE, LIQUID FILLED ORAL at 09:06

## 2019-02-05 RX ADMIN — STANDARDIZED SENNA CONCENTRATE 8.6 MG: 8.6 TABLET ORAL at 21:09

## 2019-02-05 RX ADMIN — ACETAMINOPHEN 975 MG: 325 TABLET, FILM COATED ORAL at 06:17

## 2019-02-05 RX ADMIN — METHOCARBAMOL 500 MG: 500 TABLET, FILM COATED ORAL at 21:09

## 2019-02-05 RX ADMIN — METHOCARBAMOL 500 MG: 500 TABLET, FILM COATED ORAL at 06:17

## 2019-02-05 RX ADMIN — SERTRALINE HYDROCHLORIDE 25 MG: 25 TABLET ORAL at 21:09

## 2019-02-05 NOTE — PROGRESS NOTES
Internal Medicine Progress Note  Patient: Benjamin Marin  Age/sex: 80 y o  female  Medical Record #: [id number]      ASSESSMENT/PLAN:  Benjamin Marin is seen and examined and management for following issues:    Type III odontoid fracture with fracture line extending to the right/left superior articular facets and left transverse foramen of C2; nondisplaced fracture through bulky, bridging osteophytes at the C6 level:  required no surgery  Continue collar  Facial pain she describes is intermitt and in front of ears and is bilateral, mostly R/T activity  Continue Neurontin     HTN with hx right RA stent: per renal = acceptable on Norvasc 5 mg qd, Cozaar 50mg qd, Lopressor 25mg q12hrs    Hyponatremia:  Resolved/stable; renal did follow and have signed off  Torsemide was switched back to Lasix 20 mg qd as at home; had been on FR 1200 and yesterday liberalized to 1500 (keep for home)  Ground-glass attenuation RLL lower lung base: For repeat CT chest 3-6 months        CKD; baseline 1 0-1 1:  baseline per renal and stable currently     Hx bioAVR 2012: had no significant disease when had card cath before surgery     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 2016:  continue Plavix that she has been on since CVA 2005; with her TIA 2016, neuro kept Plavix; she is intol statins and uses Red yeast rice and Fish oil at home     DM diet controlled:   watching fasting blood sugars  Stable      Chronic diastolic CHF, LVEF 36%, mild-mod MR/mild TR and norm function AVR 2016:  on Lasix 20mg qd as at home  Follows with Dr Kirstin Diaz  Family says LE edema is less than at home    CXR 1/24 was negative for CHF     RA/SLE; chr pain/fibromyalgia:  continue Plaquenil for RA; on Hydrocodone at home for chr pain      Hypothyroidism:  continue current Levothyroxine     Depression: Zoloft      Subjective:  denies any complaints currently     ROS:   GI: denies abdominal pain, change bowel habits or reflux symptoms  Neuro: +Right facial pain  Respiratory: No Cough, SOB  Cardiovascular: No CP, palpitations     Scheduled Meds:    Current Facility-Administered Medications:  acetaminophen 975 mg Oral [facility] Travis Parker MD   albuterol 2 puff Inhalation Q4H PRN Travis Parker MD   amLODIPine 5 mg Oral Daily Dima Gould MD   barium sulfate 450 mL Oral Once in imaging Dima Gould MD   calcium carbonate-vitamin D 1 tablet Oral BID With Meals Travis Parker MD   clopidogrel 75 mg Oral Daily Travis Parker MD   docusate sodium 100 mg Oral BID Travis Parker MD   enoxaparin 40 mg Subcutaneous Q24H [facility] Travis Parker MD   fish oil 1,000 mg Oral Daily Travis Parker MD   furosemide 20 mg Oral Daily Romy Saravia MD   gabapentin 500 mg Oral BID Travis Parker MD   hydroxychloroquine 200 mg Oral Daily With Breakfast Travis Parker MD   levothyroxine 112 mcg Oral Early Morning Travis Parker MD   lidocaine 1 patch Topical Daily Travis Parker MD   lidocaine 1 patch Topical Daily Travis Parker MD   losartan 50 mg Oral HS Dima Gould MD   methocarbamol 500 mg Oral TID Олег Suresh MD   metoprolol tartrate 25 mg Oral Q12H [facility] Travis Parker MD   nystatin  Topical BID Travis Praker MD   oxyCODONE 2 5 mg Oral Q8H PRN Travis Parker MD   polyethylene glycol 17 g Oral Daily PRN Travis Parker MD   senna 1 tablet Oral HS Travis Parker MD   sertraline 25 mg Oral Daily Travis Parker MD   trolamine salicylate 1 application Topical 4x Daily PRN Ewelina Perales PA-C       Labs:       Results from last 7 days  Lab Units 02/04/19  0625 01/31/19  0608   WBC Thousand/uL 5 30 6 25   HEMOGLOBIN g/dL 10 3* 11 2*   HEMATOCRIT % 32 5* 35 7   PLATELETS Thousands/uL 165 181       Results from last 7 days  Lab Units 02/04/19  0625 01/31/19  0608   SODIUM mmol/L 135* 136   POTASSIUM mmol/L 4 7 4 4   CHLORIDE mmol/L 101 101   CO2 mmol/L 25 29   BUN mg/dL 38* 38*   CREATININE mg/dL 1 18 1 18   CALCIUM mg/dL 8 7 9 1                    [unfilled]    Labs reviewed    Physical Examination:  Vitals:   Vitals:    02/04/19 1325 02/04/19 2026 02/05/19 0616 02/05/19 0917   BP: 120/58 155/62 145/70 160/70   BP Location: Left arm Left arm Left arm    Pulse: 59 64  60   Resp: 18 18     Temp: 97 6 °F (36 4 °C) 98 3 °F (36 8 °C)     TempSrc: Oral Oral     SpO2: 98% 97% 98%    Weight:       Height:           HEENT:  No thrush  RESP: CTAB, no R/R/W; resp unlabored  CV: +S1 S2, regular rate, no rubs/murmurs  ABD: soft, NT, ND, normal BS   EXT: edema of LEs L>R is stable  Neuro: AAO; [place] 4/5      [ X ] Total time spent: 30 Mins and greater than 50% of this time was spent counseling/coordinating care  ** Please Note: Dragon 360 Dictation voice to text software may have been used in the creation of this document   **

## 2019-02-05 NOTE — ASSESSMENT & PLAN NOTE
During stay developed pain in her neck that radiated to her temples/side of her face  On exam, noted to have multiple trigger points, worse on the R side of her neck with twitch signs  Noted particularly in upper trapezius and splenius capitis  - Discussed with Neurosurgery who had no contraindications to treatment  - On 2/5/19, patient was consented after a discussion of risks/benefits  Trigger point injections of 0 5% lidocaine in 1cc aliquots to 2 spots in her R upper trapezius (Cervical portion), and 1 spot in her splenius capitis were targeted  She wanted to hold off on her L side at this time  - She reported 50% improvement, and would like to follow-up with rehab as an outpatient for repeat trigger injections if needed

## 2019-02-05 NOTE — ASSESSMENT & PLAN NOTE
Was seen by wound care nursing  Has two areas of raw skin on her bilateral labia, best seen posteriorly  Hydroguard is being placed on these regions  Will be receiving home wound care nursing to treat and monitor these areas

## 2019-02-05 NOTE — PROGRESS NOTES
02/05/19 0930   Pain Assessment   Pain Assessment 0-10   Pain Score 5   Pain Type Acute pain   Pain Location Head   Pain Orientation Upper   Pain Radiating Towards shoulders   Restrictions/Precautions   Precautions Bed/chair alarms; Fall Risk;Supervision on toilet/commode   Braces or Orthoses C/S Collar   Subjective   Subjective pt agreeable to perform PT session    QI: Roll Left and Right   Reason if not Attempted Activity not applicable   Roll Left and Right CARE Score 9   QI: Sit to Lying   Reason if not Attempted Activity not applicable   Sit to Lying CARE Score 9   QI: Lying to Sitting on Side of Bed   Reason if not Attempted Activity not applicable   Lying to Sitting on Side of Bed CARE Score 9   QI: Sit to Stand   Reason if not Attempted Activity not applicable   Sit to Stand CARE Score 9   Bed Mobility   Findings pt states does not need to perform bed mobility d/t sleeping in recliner at home  and inc pain in C ' area  QI: Chair/Bed-to-Chair Transfer   Assistance Needed Set-up / clean-up   Chair/Bed-to-Chair Transfer CARE Score 5   Transfer Bed/Chair/Wheelchair   Limitations Noted In Endurance;Balance   Adaptive Equipment Roller Walker   Stand Pivot Supervision   Sit to Stand Supervision   Stand to [employer] Transfer Supervision;Contact Guard   Findings extra time to perform car tansfers   Bed, Chair, Wheelchair Transfer (FIM) 5 - Patient requires supervision/monitoring   QI: Car Transfer   Assistance Needed Verbal cues; Incidental touching   Assistance Provided by [place] 25%-49%   Comment needs assist with B/L LE    Car Transfer CARE Score 3   QI: Walk 10 Feet   Assistance Needed Set-up / clean-up   Walk 10 Feet CARE Score 5   QI: Walk 50 Feet with Two Turns   Assistance Needed Set-up / clean-up   Walk 50 Feet with Two Turns CARE Score 5   QI: Walk 150 Feet   Assistance Needed Set-up / clean-up   Walk 150 Feet CARE Score 5   QI: Walking 10 Feet on Uneven Surfaces   Reason if not Attempted Activity not applicable   Walking 10 Feet on Uneven Surfaces CARE Score 9   Ambulation   Does the patient walk? 2  Yes   Primary Discharge Mode of Locomotion Walk   Walk Assist Level Supervision   Gait Pattern Decreased foot clearance; Slow Jenny; Improper weight shift; Wide JEFE   Assist Device Roller Kerline Fang Walked (feet) 155 ft  (x2)   QI: Wheel 50 Feet with Two Turns   Reason if not Attempted Activity not applicable   Wheel 50 Feet with Two Turns CARE Score 9   QI: Wheel 150 Feet   Reason if not Attempted Activity not applicable   Wheel 306 Feet CARE Score 9   Wheelchair mobility   QI: Does the patient use a wheelchair? 0   No   Wheelchair (FIM) 0 - Activity does not occur   QI: 1 Step (Curb)   Assistance Needed Incidental touching   1 Step (Curb) CARE Score 4   QI: 4 Steps   Assistance Needed Incidental touching   4 Steps CARE Score 4   QI: 12 Steps   Reason if not Attempted Activity not applicable   12 Steps CARE Score 9   Stairs   Type Curb;Stairs   # of Steps 6   Weight Bearing Precautions Cervical;Fall Risk   Assist Devices Single Rail;Hand Hold   Stairs (FIM) 2 - Patient goes up and down 4 - 11 stairs regardless of assist/device/setup   QI: Picking Up Object   Reason if not Attempted Safety concerns   Picking Up Object CARE Score 88   QI: Toilet Transfer   Reason if not Attempted Activity not applicable   Toilet Transfer CARE Score 9   Toilet Transfer   Toilet Transfer (FIM) 0 - Activity does not occur   Therapeutic Interventions   Strengthening LAQ AP hip flexion in sitting and in standing with RW for AD , x15    Flexibility calf and hamstring in sitting manual stretch    Balance standing balance w/o AD    Other walkikng forward and backwards with RW no LOB    Equipment Use   NuStep level 2 for 12 min B/L LE ONLY    Assessment   Treatment Assessment pt perform thex ex's , functional activities  and gait training with RW ,instructed pt will alll safety percaution and ascendinga nd descending stairs as above  Pt shows good demo/ with STS and hand placement before standing and sitting   pt also cont to demo with good safety awareness with functional transfers using RW  Pt will cont toward goals    Barriers to Discharge Inaccessible home environment   Plan   Treatment/Interventions Functional transfer training;LE strengthening/ROM; Elevations; Therapeutic exercise; Endurance training;Gait training;Patient/family training   Progress Progressing toward goals   PT Therapy Minutes   PT Time In 0930   PT Time Out 1030   PT Total Time (minutes) 60   PT Mode of treatment - Individual (minutes) 60   PT Mode of treatment - Concurrent (minutes) 0   PT Mode of treatment - Group (minutes) 0   PT Mode of treatment - Co-treat (minutes) 0   PT Mode of Teatment - Total time(minutes) 60 minutes   Therapy Time missed   Time missed?  No

## 2019-02-05 NOTE — PROGRESS NOTES
02/05/19 1245   Pain Assessment   Pain Assessment 0-10   Pain Score 4   Pain Type Acute pain   Pain Location Face;Head   Restrictions/Precautions   Precautions Bed/chair alarms; Fall Risk;Supervision on toilet/commode   ROM Restrictions Yes   Braces or Orthoses C/S Collar   Subjective   Subjective pt agreeable for car transfers outside with her daugther   QI: Chair/Bed-to-Chair Transfer   Assistance Needed Set-up / clean-up   Chair/Bed-to-Chair Transfer CARE Score 5   Transfer Bed/Chair/Wheelchair   Limitations Noted In Balance; Endurance   Adaptive Equipment Roller Walker   Stand Pivot Supervision   Sit to Stand Supervision   Stand to Jule Game Supervision   Findings pt S level all transfers   plus car transfer outside at S level with her daughter , with instructed on safety percaution and body positioning  and hand placement    Bed, Chair, Wheelchair Transfer (FIM) 5 - Patient requires supervision/monitoring   QI: Car Transfer   Assistance Needed Set-up / clean-up   Car Transfer CARE Score 5   QI: [address] / clean-up   Walk 10 Feet CARE Score 5   QI: Walk 50 Feet with Two [address] / clean-up   Walk 50 Feet with Two Turns CARE Score 5   QI: Walk 150 Feet   Assistance Needed Set-up / clean-up   Walk 150 Feet CARE Score 5   QI: Walking 10 Feet on Uneven Surfaces   Assistance Needed Set-up / clean-up   Comment ramp downstair on first floor about 40 feet    Walking 10 Feet on Uneven Surfaces CARE Score 5   Ambulation   Does the patient walk? 2  Yes   Primary Discharge Mode of Locomotion Walk   Walk Assist Level Supervision   Gait Pattern Inconsistant Jenny; Slow Jenny;Decreased foot clearance; Improper weight shift; Wide JEFE   Assist Device Lisa Fang Walked (feet) 158 ft   Limitations Noted In Balance; Coordination   Walking (FIM) 5 - Patient requires supervision/monitoring AND distance 150 feet or more, no rest   Wheelchair mobility   QI: Does the patient use a wheelchair? 0  No   Toilet Transfer   Surface Assessed Standard Toilet   Limitations Noted In Endurance;Balance   Adaptive Equipment Grab Bar   Positioning Concerns Arm Rest   Toilet Transfer (FIM) 5 - Patient requires supervision/monitoring   Therapeutic Interventions   Strengthening LAQ AP X15 QS X15    Balance Walking forward and backward with RW // inplace marching with AD RW for 60 sec    Assessment   Treatment Assessment pt daughter instructed with car transfers and curb steps with RW to transfers into her car for D/C  Daughter instructed on body positioning and hand placement on her mother for safety and VC for pt for hand placement and foot sequence to enter car and out of car  Pt will be D/C on 2/6 this week with all needs for home   pt will f/u with MD post D/C    Barriers to Discharge Inaccessible home environment   Plan   Treatment/Interventions Functional transfer training;LE strengthening/ROM; Endurance training; Therapeutic exercise;Elevations; Patient/family training;Gait training   Progress Progressing toward goals   PT Therapy Minutes   PT Time In 1245   PT Time Out 1330   PT Total Time (minutes) 45   PT Mode of treatment - Individual (minutes) 45   PT Mode of treatment - Concurrent (minutes) 0   PT Mode of treatment - Group (minutes) 0   PT Mode of treatment - Co-treat (minutes) 0   PT Mode of Teatment - Total time(minutes) 45 minutes   Therapy Time missed   Time missed?  No

## 2019-02-05 NOTE — PROGRESS NOTES
Physical Medicine and Rehabilitation Progress Note  Samir Ordoñez 80 y o  female MRN: [id number]  Unit/Bed#: -01 Encounter: [id number]    HPI: Catie Angulo a 80 y  o  female who presented to the [facility] as a trauma following a fall on Plavix  She utilizes a SPC at baseline, and had hit a wet patch on the floor causing the cane to slip from under her  Her PMH is significant for HTN with history of CVA, AVR, HTN, and T2DM  CT C-Spine revealed a Type 3 Odontoid fracture extending to the R and L superior articular facets and L transverse foramen  She also had an acute non-displaced C6 fracture  Treated non-op  Course c/b by fluid overload, hospital delirium, and  Pain  These are improving  She was evaluated by PT/OT and determined to be appropriate for discharge to the [facility] on 19  Chief Complaint: "Last night was hard too"    Interval: Patient s/e today at breakfast  She reports she had another episode of pain, that typically starts in her neck and radiates up the side of her head bilaterally  Palpation provokes the pain, and pressure on these points from her cervical collar provokes the pain  She denies associated weakness, numbness, tingling, changes in vision or difficulty swallowing  When it occurs, the pain is severe  The pain does not radiate down her arms  Raising her right shoulder exacerbates the pain  She denies any new bowel/bladder dysfunction  She denies any symptoms in her lower extremities  She has never had trigger point injections in the past      ROS:  Negative except for what is noted in HPI/CC/Sbuj    Assessment/Plan:      * Ambulatory dysfunction   Assessment & Plan    Patient will participate in a comprehensive inpatient rehab program with 3-5 hours a day 5-7 days a week of PT/OT  To evaluate for any further SLP needs    - Continue pain management  - Fall precautions     C6 cervical fracture [facility])   Assessment & Plan    Non-op management  - Pain control as noted above in Type III odontoid fracture  - PT/OT     Type III fracture of odontoid process [facility])   Assessment & Plan    Please see ambulatory dysfunction  Follow-up X-rays  x2 1/28: some increased anterior displacement compared with 1/11 films  Neuro exam stable   - C-Collar ATC   - Cervical precautions  - Discussed with Neurosurgery Initial films on 1/28 were with patient bent forward, exaggerating displacement  Repeat XR done appeared stable  - Plan for follow-up with Neurosurgery 2 weeks with another XR of cervical spine  Dermatitis associated with moisture   Assessment & Plan    Was seen by wound care nursing  Has two areas of raw skin on her bilateral labia, best seen posteriorly  Hydroguard is being placed on these regions  Will be receiving home wound care nursing to treat and monitor these areas  Trigger point   Assessment & Plan    She has trigger points noted in her cervical paraspinals: Upper trapezius, and splenius  Cervicis/capitis  Will get consent and trial trigger points later today  Discussed with Neurosurgery who has no objections  Lesion of left lung   Assessment & Plan    CT C/A/P - 1 cm groundglass attenuation density in the right lower lobe  Suggest reassessment with repeat chest CT in 3-6 months  Pain   Assessment & Plan    Improving neck and associated myofascial pain improving  - Continue scheduled APAP  - Adjust Robaxin 500mg TID for muscle spasms but limit impact on sleep with QID dosing  - Gabapentin increased to 600mg BID scheduled for adjuvant  Pain control and chronic Fibro  Can increase if needed to up to 700mg BID    - Decreased oxycodone 2 5mg to Q8hr PRN - patient titrating down appropriately  - Discussed with patient and she will go home on home Hydrocodone  Will discuss equivalencies in dosing with her     - Lidocaine patch     History of aortic valve replacement with bioprosthetic valve   Assessment & Plan    Monitor cardiopulmonary status  IM to manage  - Cath without serious cardiac dz prior to her surgery  Fibromyalgia   Assessment & Plan    Continue sertraline  Continue gabapentin to 500mg BID  Consult neuropsychology for adjustment and coping skills for pain management  Rheumatoid arthritis involving multiple sites [facility])   Assessment & Plan    Continue plaquenil, and pain mgmt as outlined below  - Monitor for flare  - IM following     Stage 3 chronic kidney disease ([address] )   Assessment & Plan    Had an episode of HERLINDA  Now resolved with stable creatinine at 1 18    - Baseline crea ~ 1    - Nephro following, recs appreciated  Continue to monitor function on Lasix  - Renally dosed medications  - Avoiding nephrotoxic medications     - Nephrology has signed off and will follow-up with her at discharge  Depression   Assessment & Plan    Continue sertraline  Type 2 diabetes mellitus with diabetic polyneuropathy [facility])   Assessment & Plan    Lab Results   Component Value Date    HGBA1C 5 2 01/18/2019       No results for input(s): POCGLU in the last 72 hours  Blood Sugar Average: Last 72 hrs:  - Monitor fasting glucose  - Continue diabetic diet  - At home diet controlled  - Placed on diabetic diet  - IM to manage     History of CVA (cerebrovascular accident)   Assessment & Plan    Old R BG stroke appreciated on recent CT head   Cannot tolerate statins  Continue Plavix  Continue fish oil  At home on red yeast rice as well  Chronic diastolic heart failure (HCC)   Assessment & Plan    Continue lasix 20mg qday per nephro/IM  Monitor kidney function on lasix   Continue ARB/BB     Renovascular hypertension   Assessment & Plan    Mgmt per IM/Nephro  Amlodipine 5mg  Goal <130/80 but not by much per Nephrology  Losartan 50mg  Continue metoprolol tartrate 25mg Q12  Continue Lasix 20mg daily  S/p stenting for DONAVAN       Hyponatremiaresolved as of 2/4/2019   Assessment & Plan    Resolved     - Patient continuing Fluid restriction   - Sodium tabs discontinued  - Resolved with most recent Na on 1/31/18 136 < 135 <137  Imaging per prior providers:  - CTCAP to rule out other causes - unremarkable except for possible pulmonary nodule  - CTH unremarkable except for old, stable lacunar infarct  - Remainder of work-up unrevealing    - Restarted on furosemide  - Will monitor sodium periodically  # Skin  · Encourage regular turning as patient at risk for skin breakdown  · Staff to continue patient education on Q2h turning  · Maceration on labia and crural folds with hydraguard and ABD pads      # Bowel  · Patient reports no constipation  · Colace, Senna, Miralax      # Bladder  · Patient voiding spontaneously    # Pain  · Continue tylenol, for max of 3gm daily  · Continue oxycodone   · Continue methocarbamol  · Continue gabapentin    # Rehab Psych   · referral to Rehabilitation Psychology    # Other  - Diet/Nutrition:        Diet Orders            Start     Ordered    02/04/19 1039  Diet Regular; Regular House; Fluid Restriction 1500 ML  Diet effective now     Question Answer Comment   Diet Type Regular    Regular Regular House    Other Restriction(s): Fluid Restriction 1500 ML    RD to adjust diet per protocol? Yes        02/04/19 1038    01/18/19 0721  Room Service  Once     Question:  Type of Service  Answer:  Room Service - Appropriate with Assistance    01/18/19 0720    01/17/19 1654  Dietary nutrition supplements  Once     Question Answer Comment   Select Supplement: Ensure Enlive-Vanilla    Frequency Lunch        01/17/19 1653        - DVT prophy: Sequential compression device (Venodyne)  and Enoxaparin (Lovenox)  - GI ppx: None  - Nausea: None  - Supplements: None  - Sleep: None    Disposition: Team 1/30/19 Making slow progress  Has decrease tolerance/endurance, UE strength/ROM, memory  Goal is to get patient to a Supervision level with ADLs/transfers/mobility with LRAD  She is making progress   Plan for discharge on 2/6/19  CODE: Level 1: Full Code   Scheduled Meds:    Current Facility-Administered Medications:  acetaminophen 975 mg Oral [facility] Kip Parkinson MD   albuterol 2 puff Inhalation Q4H PRN Kip Parkinson MD   amLODIPine 5 mg Oral Daily Patrick Ramírez MD   barium sulfate 450 mL Oral Once in imaging Patrick Ramírez MD   calcium carbonate-vitamin D 1 tablet Oral BID With Meals Kip Parkinson MD   clopidogrel 75 mg Oral Daily Kip Parkinson MD   docusate sodium 100 mg Oral BID Kip Parkinson MD   enoxaparin 40 mg Subcutaneous Q24H [address] Kip Parkinson MD   fish oil 1,000 mg Oral Daily Kip Parkinson MD   furosemide 20 mg Oral Daily Claudette Norris, MD   gabapentin 500 mg Oral BID Kip Parkinson MD   hydroxychloroquine 200 mg Oral Daily With Breakfast Kip Parkinson MD   levothyroxine 112 mcg Oral Early Morning Kip Parkinson MD   lidocaine 1 patch Topical Daily Kip Parkinson MD   lidocaine 1 patch Topical Daily Kip Parkinson MD   losartan 50 mg Oral HS Patrick Ramírez MD   methocarbamol 500 mg Oral TID Su Martinez MD   metoprolol tartrate 25 mg Oral Q12H [address] Kip Parkinson MD   nystatin  Topical BID Kip Parkinson MD   oxyCODONE 2 5 mg Oral Q8H PRN Kip Parkinson MD   polyethylene glycol 17 g Oral Daily PRN Kip Parkinson MD   senna 1 tablet Oral HS Kip aPrkinson MD   sertraline 25 mg Oral Daily Kip Parkinson MD   trolamine salicylate 1 application Topical 4x Daily PRN Ewelina Perales PA-C        Objective:    Functional Update:   2/3/19 OT: Tin Gayle toileting hygiene, toileting  2/3/19 PT: S transfers, ambulation ' with RW,     Allergies per EMR    Physical Exam:  Temp:  [97 6 °F (36 4 °C)-98 3 °F (36 8 °C)] 98 3 °F (36 8 °C)  HR:  [59-64] 60  Resp:  [18] 18  BP: (120-160)/(58-70) 160/70  SpO2:  [97 %-98 %] 98 %    General: alert, no apparent distress, cooperative and comfortable  HEENT:  Head: Normocephalic, no lesions, without obvious abnormality  C-collar in place   No evidence of pressure injury at chin/jawline/occiput She has discrete trigger points on the R > L cervical paraspinals, along her upper trapezius and splenius  CARDIAC:  regular rate and rhythm, S1, S2 normal, no murmur, click, rub or gallop  LUNGS:  normal air entry, lungs clear to auscultation  ABDOMEN:  soft, non-tender  Bowel sounds normal  No masses, no organomegaly  EXTREMITIES:  Minimal pitting edema BL LE  wearing home NABIL stockings  NEURO:   normal without focal findings, mental status, speech normal, alert and oriented x3 and Strength is >4/5 throughout  PSYCH:  Normal  and Alert and oriented, appropriate affect  MSK: She has discrete ropes of muscle in her cervical paraspinals R>L, with elicitation of radiation to the sides of her face and head with palpation  Diagnostic Studies: Reviewed  XR follow up   Final Result by Chris Cuellar MD (01/29 1848)      Anterior displacement at the odontoid fracture is unchanged from exam earlier in the same day, but increased from January 11  Unchanged alignment at C6 anterior syndesmophyte fracture  Workstation performed: [id number]         XR spine cervical 2 or 3 vw injury   Final Result by Taty Fountain MD (01/28 1142)      Increased anterior displacement of odontoid fracture  Workstation performed: [id number]         CT chest abdomen pelvis wo contrast   Final Result by Alicia Carlin MD (01/25 1954)      No suspicious appearing masses are seen  There is an approximately 1 cm groundglass attenuation density in the right lower lobe  Suggest reassessment with repeat chest CT in 3-6 months  Relatively mild colonic diverticulosis  Workstation performed: [id number]         CT head wo contrast   Final Result by Alicia Carlin MD (01/25 1943)      No acute intracranial abnormality  Moderate chronic microvascular ischemic change in periventricular white matter and cerebral atrophy  Workstation performed: [id number]         XR chest pa & lateral   Final Result by Srinivas Donis DO (01/25 1593)   No acute cardiopulmonary disease  Workstation performed: [id number]             Laboratory: Reviewed    Results from last 7 days  Lab Units 02/04/19  0625 01/31/19  0608   HEMOGLOBIN g/dL 10 3* 11 2*   HEMATOCRIT % 32 5* 35 7   WBC Thousand/uL 5 30 6 25       Results from last 7 days  Lab Units 02/04/19  0625 01/31/19  0608 01/30/19  0620   BUN mg/dL 38* 38* 40*   SODIUM mmol/L 135* 136 135*   POTASSIUM mmol/L 4 7 4 4 4 5   CHLORIDE mmol/L 101 101 101   CREATININE mg/dL 1 18 1 18 1 18            Patient Active Problem List   Diagnosis    Closed nondisplaced fracture of second cervical vertebra (HCC)    Neck pain, acute    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)    C6 cervical fracture (HCC)    Hypertension    Hypothyroidism    Fall    Forgetfulness    Ambulatory dysfunction    Physical deconditioning    Acute pain    Delirium    Renovascular hypertension    Chronic diastolic heart failure (HCC)    History of CVA (cerebrovascular accident)    Type 2 diabetes mellitus with diabetic polyneuropathy (HCC)    Depression    Stage 3 chronic kidney disease (HCC)    Rheumatoid arthritis involving multiple sites ([facility] [address] )    Fibromyalgia    History of aortic valve replacement with bioprosthetic valve    Renal artery stenosis (HCC)    Parenchymal renal hypertension    Pain    Lesion of left lung       ** Please Note: Fluency Direct voice to text software may have been used in the creation of this document  **     ADDENDUM 2/5/19  Procedure Note  The risks and benefits were explained to the patient, and verbal consent was received  The 3 trigger points were identified with twitch responses and marked along the right cervical portion of the upper trapezius (2 spots) and the splenius capitis (1 spot)   Each site was prepped with alcohol and injected with 1cc aliquots of Lidocaine 0  5%  Hemostasis was achieved with pressure, and the patient tolerated the procedure well  She reported some relief of her discomfort  She denied any changes in vision, lightheadedness, dizziness, numbness, tingling, weakness  Cervical precautions were maintained      Leanna Carrero MD  Physical Medicine and Rehabilitation

## 2019-02-05 NOTE — DISCHARGE INSTRUCTIONS
Rosario Gonsales, you presented to the [facility] as a trauma following a fall on Plavix  You were using a cane, which slipped on a wet patch on the floor  Your medical history is significant for high blood pressure, previous stroke, aortic valve replacement, hypertension, type 2 diabetes  CT of your cervical spine showed a Type 3 Odontoid fracture extending to the R and L superior articular facets and L transverse foramen  You also had an acute non-displaced C6 fracture  CTA Neck was negative for aneurysm or dissection and CTH was negative for acute intracranial process  Neurosurgery evaluated the you and recommended non-operative management  Neurologically, you are intact  Geriatric Medicine was consulted, who noted a normal mini cognitive screen, but some forgetfulness at baseline  There were no significant medications noted that would have contributed to your fall  Your course was complicated by pain, for which you were placed on opioids, scheduled tylenol, robaxin, and lidoderm patches  You had some constipation due to opioid use and were regular on a bowel regimen of senna/colace  You also developed mild fluid overload treated with Lasix, and hypertension  She also had episodes of sundowning and hospital delirium at night  She was treated with delirium precautions - there was no evidence of an organic cause (such as an infection) contributing to your delirium  This completely resolved  You were evaluated by PT/OT and determined to be appropriate for discharge to the [facility] on 1/17/19  Here we managed the following issues:    * Ambulatory dysfunction   Assessment & Plan     You participated in a comprehensive inpatient rehab program with 3-5 hours a day 5-7 days a week of PT/OT  You are going home at a Supervision level at this time, meaning you should have supervision around the house   You will also be receiving home PT/OT/wound care nursing        C6 cervical fracture ([facility] [address] ) Assessment & Plan     Non-op management  - Pain control as noted above in Type III odontoid fracture  - PT/OT      Type III fracture of odontoid process [facility])   Assessment & Plan     You had follow-up XR done on 1/28 twice that appeared to show some increased anterior displacement, however, upon evaluation by Neurosurgery they appeared stable  - Please follow-up with Neurosurgery on 2/13/19  Please get your XRs done 2-3 days prior to this appointment  - You are to continue to wear your collar around the clock and take cervical precautions to prevent further devastating injury        Trigger point   Assessment & Plan         On exam you were noted to have trigger points that cause lateral head pain, worse on the right than the left  On 2/5/19, you had trigger point injections to your upper trapezius and splenius capitis with some improvement  - You should follow-up with Dr Gt Liao, one of our rehab physicians at discharge to monitor and treat as needed        Lesion of left lung   Assessment & Plan     CT C/A/P - 1 cm groundglass attenuation density in the right lower lobe   Suggest reassessment with repeat chest CT in 3-6 months  - You do have an appointment with your oncologist on 2/12/19        Pain   Assessment & Plan     Multifactorial due to neck fracture, muscle spasm, myofascial pain, and trigger points  - At this time you will be transitioning to your home hydrocodone  Please take it as prescribed, and try to titrate off of it   - Your home medication has Tylenol in it (325mg per pill)  You are only to take 3g of Tylenol total a day to prevent liver damage    - You can take Robaxin 500mg up to 4x a day as needed for muscle spasms    - We increased your Gabapentin gradually to 600mg BID  This should help with the pain related to your fibromyalgia    - You can continue to use your lidocaine patches for 12 hours at a time (they must be removed for 12 hours)     - Please do not take any benzodiazepines or any other muscle relaxants or narcotics on this regimen      Fibromyalgia   Assessment & Plan     Continue sertraline  Kelly [place] has been increased to 600mg BID  Neuropsychology followed along to provide coping skills management  - You may benefit from aqua therapy as well as biofeedback, which has been very helpful for you in the past        Rheumatoid arthritis involving multiple sites [facility])   Assessment & Plan     Continue your plaquenil        Stage 3 chronic kidney disease ([facility] [address] )   Assessment & Plan     Had an episode of HERLINDA  During your stay, your kidney function stabilized  Your most recent creatinine is 1 18  Your baseline is 1    - Avoid NSAIDs at this time (Aleve, Ibuprofen, Motrin, etc)  - You were followed by the kidney doctors and can follow-up with them on an as needed basis  - Please have a BMP (bloodwork) done in 1 week and follow-up with your PCP        Depression   Assessment & Plan     Continue sertraline        Type 2 diabetes mellitus with diabetic polyneuropathy [facility])   Assessment & Plan             Lab Results   Component Value Date     HGBA1C 5 2 01/18/2019      - We monitored your fasting glucose  Continue a diabetic diet       History of CVA (cerebrovascular accident)   Assessment & Plan     Old R BG stroke appreciated on recent CT head   Cannot tolerate statins  Continue Plavix  Continue fish oil and red yeast rice at discharge  Chronic diastolic heart failure (HCC)   Assessment & Plan     You were restarted on your Lasix  You should follow-up with labs in 1 week to check your electrolytes  - You can follow-up with your PCP    - Your Losartan was decreased to 50mg and you are on the same dose of metoprolol at this time        Renovascular hypertension   Assessment & Plan     Amlodipine 5mg  Goal <130/80 but not by much per Nephrology  Losartan 50mg  Continue metoprolol tartrate 25mg Q12  Continue Lasix 20mg daily        Hyponatremia-resolved as of 2/4/2019   Assessment & Plan     Resolved  - You were seen by nephrology and are currently on a fluid restriction of 1500mL  - Please get labs in 1 week to check your sodium level  - A full work-up was negative (including imaging of your chest, abdomen, pelvis, and head)  - This is most likely related to your pain and injury    - You were initially treated with sodium tabs, but those have been discontinued  - You are back on your Lasix  Dermatitis associated with moisture   Assessment & Plan     Was seen by wound care nursing  Has two areas of raw skin on her bilateral labia, best seen posteriorly  Hydroguard is being placed on these regions  Will be receiving home wound care nursing to treat and monitor these areas  Please see your doctors listed in the follow up providers section of your discharge paperwork, and take the discharge paperwork with you to your appointments  ***  Should you develop feelings of significant hopelessness, severe depression, severe anxiety, or suicide you should call 911 or obtain transportation to nearest ER  [employer] Suicide Prevention Lifeline is 7-138.773.6208 and is available 24 hrs/day  If you have any questions or concerns regarding your acute rehabilitation stay including issues with medications, rehabilitation, and follow-up plan, please call:   [address] [place] [address] at [phone]  You should have the following blood work done after discharge:  BMP  Follow-up with outpatient physicians regarding the results  Please note the following incidental findings were found during your recent hospitalization please discuss them with your doctors *** so that they may arrange any tests/referrals as they deem necessary:  CT C/A/P - 1 cm groundglass attenuation density in the right lower lobe   Suggest reassessment with repeat chest CT in 3-6 months  Weightbearing/Activity precautions:  WBAT      Plavix (clopidogrel) instructions  Take Plavix (clopidogrel) every day INDEFINITELY unless instructed otherwise by a doctor  Should you develop fevers, chills, new weakness, changes in sensation, difficulty speaking, facial weakness, confusion, or other concerning symptoms please call 911 and/or obtain transportation to nearest ER immediately  Please note you are restricted from driving/operating a motorized vehicle/operating heavy machinery/etc     You are restricted from driving until cleared by outpatient physician  Please note changes may have been made to your medications please refer to your discharge paperwork for your current medications and take this list with you to all your doctors appointments for your doctors to review  Please do not resume a home medication unless the medication reconciliation sheet indicates to do so, please do not assume that a medication that you were given a prescription for is the same as a medication you have at home based on both medications having the same name as dosages and frequency may have changed  You have been continued on chronic opiate pain medications on discharge; should you require additional refills/medications, your PCP and/or surgeon/pain provider may prescribe at his/her discretion  They can be quite helpful particularly for severe acute pain and at times chronic pain  They can increase your risk of falling and injury which can be severe and even life-threatening  Note it is advisable to limit use of pain medications for these reasons as well as they can become habit forming and lead to addiction  Prior to this hospitalization you were taking Hydrocodone as prescribed by your outpatient physicians for pain related to your rheumatoid arthritis and fibromyalgia  During this hospital course we had you on oxycodone  At discharge you can transition back to your home opioids (hydrocodone)  Please continue to titrate down this medication as tolerated        Unless specifically discussed with physician, please do not combine any muscle relaxants (including but not limited to zanaflex, flexaril, soma, robaxin, etc) pain medications (including but not limited to tramadol, vicodin, norco, percocet, oxycodone, oxycontin, morphine, hydropmorphone, oxymorphone, fentanyl, hydrocodone, etc)  or sedatives/sleep aids/anti-anxiety medications (including but not limited to [place], trazodone, valium, xanax, klonipin, ativan, lorazepam, restoril, temazepam etc) that you may have been prescribed prior to admission with the pain medication you are being prescribed upon discharge from the rehab unit (unless listed to do so on your medication reconciliation in your discharge paperwork)   Note:  It is advisable to limit the use of pain medications (including but not limited to tramadol, vicodin, norco, percocet, oxycodone, oxycontin, morphine, hydropmorphone, oxymorphone, fentanyl, hydrocodone, etc) and avoid sedatives/sleep aids/anti-anxiety medications (including but not limited to [place], trazodone, valium, xanax, klonipin, ativan, lorazepam, restoril, temazepam etc), muscle relaxants (including but not limited to zanaflex, flexaril, soma, robaxin, etc); unless listed to do so on your medication reconciliation in your discharge paperwork, as they may become habit forming and lead to additiction  Please do not drive or operate heavy machinery while on hydrocodone  Do not drink alcohol while on current medications as this can increase your risk of injury or severe complication  Please check your blood pressure prior to taking your blood pressure medications and keep a log that you will bring with you to your follow-up doctors' appointments      >Please contact your family doctor or cardiologist immediately for a blood pressure below 100/50 and do not take your blood pressure medications until speaking with them  >Please contact your family doctor or cardiologist as soon as possible for blood pressure greater than 160/100    Please avoid NSAID (including but not limited to advil, aleve, motrin, naproxen, ibuprofen, mobic, meloxicam, diclofenac etc) medications as NSAID medications may increase your risk of bleeding (which can be life-threatening), stroke, heart attack, kidney disease,  delay bone healing  Please wear your cervical collar at all times until cleared by [facility]    Please follow your spine precautions as instructed until cleared by [facility]    No pushing/pulling/lifting greater than 5 lbs until cleared by [facility]    Please have your x-rays done 2-3 days prior to your 6 week post operative follow up visit with your neurosurgeon and bring the disk with the x-rays on them with you to the appointment; you have been provided an electronic prescription that can be used at [facility] radiology facilities however if you do not wish to use [facility] radiology services please contact [facility] for a paper prescription to use at a radiology facility of your choosing and bring the disk with the x-rays/images on them to your appointment     Unless specifically noted in your medication list provided to you in your discharge paper work, please discuss with your family doctor prior to resuming any vitamins, minerals, supplements you may have been taking prior to your hospitalization     Please note a summary of your hospital stay with relevant information for your doctors will try to be sent to them  Please confirm with your doctors at your follow up visits that they have received this summary and have them contact [address] [place] [address] if they have not received them along with any other medical records they may require  Hang Rogers Phone Number:  [phone]             Trigger Point Pain   WHAT YOU NEED TO KNOW:   What is a trigger point? Trigger points are often called muscle knots   They are a tight lump in an area of muscle that can cause pain  Where might trigger points occur? You may have trigger points in your neck, shoulders, or upper and lower back  You may have them in your head or jaws  You may also have them in your buttocks or legs  You may only have one trigger point or you may have many in the different areas of your body  What causes trigger points? Trigger points may be caused by muscle injury  They may also form if you use the muscle too much, or you have repeated minor stress to the muscle  Minor stress may result from things such as poor posture and sleep position  Emotional stress may also cause trigger points  This can happen when stress makes you tense certain muscles, such as those in your shoulders and neck  What are the signs and symptoms of trigger points? · Pain:  Trigger points can cause deep, aching pain  They may cause pain only when the trigger point is pressed  They may also cause constant pain, or pain during movement of the muscle  Pain may spread away from the trigger point  Pain may also occur in another part of your body  For example, a trigger point in your neck may cause eye pain  This is called referred pain  · Decreased range of motion:  Range of motion is how much you can move a joint, such as your shoulder or knee  A trigger point can shorten a muscle  This can reduce the range of motion of a nearby joint  · Muscle weakness: The pain caused by a trigger point may weaken the muscle  · Other signs and symptoms: You may be dizzy or hear ringing in your ears  Your skin over the trigger point may turn red  Your skin may tingle and be sensitive to the touch  Trigger points may also cause your mouth may make extra saliva and your eyes may make extra tears  How are trigger points diagnosed? Your healthcare provider will ask about your health history  He will also ask you to describe your pain  You may need certain tests to rule out other conditions   Your healthcare provider will look for a hard lump in your muscle  He may press on or squeeze the tissue over this lump  He will do this to see if the muscle twitches (quick movements) and if you have any pain  These signs help your healthcare provider know if you have a trigger point  You may also need tests to make sure your pain is not related to a more serious condition  Tests include:  · X-ray: This is a picture taken of your bones and tissues to look for possible causes of your pain  · Computed tomography scan: This is also called a CT scan  A special x-ray machine uses a computer to take pictures of your body  It may be used to look at your bones and muscles  You may be given dye in an IV before the pictures are taken  The dye may help your healthcare provider see the pictures better  People who are allergic to iodine or shellfish (lobster, crab, or shrimp) may be allergic to some dyes  Tell your healthcare provider if you are allergic to shellfish, or have other allergies or medical conditions  · Magnetic resonance imaging: This test is called an MRI  During the MRI, pictures are taken of your body  An MRI may be used to look at your bones and muscles  You will need to lie still during the MRI  Never enter the MRI room with any metal objects  This can cause serious injury  How are trigger points treated? · Trigger point injections:  A healthcare provider puts a needle through your skin and into the trigger point  Saline (salt solution), pain relievers, or other medicines may be pushed through the needle into the trigger point  Healthcare providers may also use only a dry needle (no medicine)  When the needle is removed, the muscle area is gently stretched  · Spray and stretch:  A cooling substance is sprayed on your skin over the trigger point  This helps relax the muscle, which is then gently stretched  · Massage:   Your healthcare provider may massage the muscle that contains the trigger point  He may also perform compression therapy  This is when he presses on the trigger point until the muscle relaxes  These methods help relax and stretch the muscle  · Transcutaneous electrical nerve stimulation: This is also called TENS  During TENS, pads are placed on the trigger point  They may also be placed where you have referred pain  The pads are attached to a device that sends electrical signals into your body  These signals help reduce pain  · Physical therapy:  A physical therapist helps you with special exercises  These exercises help gently stretch your muscles and relieve pain  Your therapist may also help you find the causes of your trigger points, such as poor posture  · Medicines: Your healthcare provider may suggest pain medicines such as ibuprofen  He may also prescribe medicines that relax your muscles  What are the risks of treatment for trigger points? You may bruise or get an infection in the area your trigger point injections (shots) were given  The shots may cause you to feel faint  You may also be sore where you got the shot  The needle may cause muscle damaged  A collapsed lung is a risk if you get the shot near your lungs  How can I manage my trigger point pain? Follow all instructions your healthcare provider gives you  He may tell you to do the following:  · Stay active after you have trigger point injections  Gently move your joints through their full range of motion during the first week  Avoid strenuous activity for 3 or 4 days  · Do regular stretches of the trigger point muscle  Place gentle pressure on the trigger point, and then stretch the muscle  Ask your healthcare provider for more information about how to stretch and apply pressure  · Apply ice or heat to the pain site  A bag of ice covered with a towel or a heating pad can help ease pain for a short time  Apply ice or heat as directed  When should I call my healthcare provider?    · Your pain has not improved after 2 to 3 sessions of trigger point injections  · You have questions or concerns about your trigger point pain  When should I seek immediate care or call 911? · You have chest pain or trouble breathing that starts suddenly  CARE AGREEMENT:   You have the right to help plan your care  Learn about your health condition and how it may be treated  Discuss treatment options with your caregivers to decide what care you want to receive  You always have the right to refuse treatment  The above information is an  only  It is not intended as medical advice for individual conditions or treatments  Talk to your doctor, nurse or pharmacist before following any medical regimen to see if it is safe and effective for you  © 2017 [address]  Information is for End User's use only and may not be sold, redistributed or otherwise used for commercial purposes  All illustrations and images included in CareNotes® are the copyrighted property of [employer]  or [employer]  Fluid Restriction   WHAT YOU NEED TO KNOW:   Fluid restriction means that you need to limit the amount of liquids you have each day  Fluid restriction is needed if your body is holding water  This is called fluid retention  Fluid retention can cause health problems, such as tissue and blood vessel damage, long-term swelling, and stress on the heart  You may need to limit the amount of liquids you have each day to less than 1,000 milliliters (mL)  Ask your healthcare provider how much liquid you can have each day  DISCHARGE INSTRUCTIONS:   Follow up with your healthcare provider as directed: Your healthcare provider may recommend that you see a dietitian on a regular basis  A dietitian can help you create a plan to get the right amount of liquid each day  The dietitian can also help you calculate the amount of liquid in the foods you eat   Write down your questions so you remember to ask them during your visits  Amount of liquid in common foods and drinks:  Liquid from both foods and drinks should be counted toward your daily liquid limit:  · 12 ounces (1 can) of soda (332 mL)    · 1 cup of juice (215 mL) or 2% milk (217 mL)    · 6 ounces of coffee (175 mL) or 6 ounces of tea (168 mL)    · 1 cup of gelatin (200 mL)    · 1 single popsicle (45 mL)    · 1 cup of ice cream (100 mL) or sherbet (127 mL)    · 1 cup of yogurt (182 mL) or cottage cheese (185 mL)    · 1 cup of raw peaches, canned in juice (218 mL)    · 1 cup of grapes (120 mL) or berries (130 mL)    · 1 cup of watermelon (140 mL)    · 1 cup of cooked broccoli (170 mL) or creamed corn (200 mL)  Track your liquid intake:  Keep a record of the amount of liquid you get each day  Record the exact amount of liquid in mL  To do this, measure the amount of ounces that your glass holds  Multiply the amount in ounces by 30 to get the amount in mL  For example, a glass may hold 16 ounces of liquid  To get the amount in mL, multiply 16 by 30  The total amount of liquid in this glass is 480 mL  Monitor your weight:  Weigh yourself at the same time every day, with the same scale  Record your weight so you can compare it to your other daily weights  You may be retaining fluid if your weight goes up by more than 2 pounds in 24 hours  If you have a sudden weight loss, you may be dehydrated  Other things to remember about fluid balance:   · Large amounts of sodium from foods can cause fluid retention  Sodium is found in table salt, salted snacks, tamez, cheddar cheese, soy sauce, lunch meat, and canned vegetables  Ask your healthcare provider how much sodium you can have each day  · Diuretics are medicines that can help your body get rid of extra fluid  If you take diuretics, follow your healthcare provider's directions  You may become dehydrated if you take too much of this medicine  Contact your healthcare provider if:   · You gain 2 pounds in 1 day  · Your skin is tight and shiny  · You are urinating very little, even though you are regularly drinking liquids  · You have signs of dehydration, such as a headache, dark yellow urine, dry eyes or mouth, or a fast heartbeat  · You have questions or concerns about your condition or care  Seek immediate care or call 911 if:   · You have a severe headache that cannot be relieved with medicine  · You have shortness of breath  · You have chest pain  © 2017 [facility] Information is for End User's use only and may not be sold, redistributed or otherwise used for commercial purposes  All illustrations and images included in CareNotes® are the copyrighted property of [employer]  or Avelino Valencia  The above information is an  only  It is not intended as medical advice for individual conditions or treatments  Talk to your doctor, nurse or pharmacist before following any medical regimen to see if it is safe and effective for you  Cervical Fracture   WHAT YOU NEED TO KNOW:   · A cervical fracture is a break in 1 or more of the 7 cervical vertebrae (bones) in your neck  Cervical vertebrae support your head and allow your neck to bend and twist  The vertebrae enclose and protect the spinal cord, which controls your ability to move  Cervical fractures can happen after injuring the neck in motor vehicle accidents, falls, diving, and contact sports  Because a cervical fracture can also harm the spinal cord, it can be a very serious injury  DISCHARGE INSTRUCTIONS:   Medicines:   · Pain medicine: You may be given medicine to take away or decrease pain  Do not wait until the pain is severe before you take your medicine  · Take your medicine as directed  Contact your healthcare provider if you think your medicine is not helping or if you have side effects  Tell him or her if you are allergic to any medicine   Keep a list of the medicines, vitamins, and herbs you take  Include the amounts, and when and why you take them  Bring the list or the pill bottles to follow-up visits  Carry your medicine list with you in case of an emergency  Follow up with your spine specialist or healthcare provider as directed:  Write down your questions so you remember to ask them during your visits  Skin and brace care:  Skin breakdown can lead to deep wounds caused by pressure or pulling on your skin  Check your chin, ears, back of your head, and shoulders for redness or sores if you are wearing a brace  Check the skin daily around halo brace pins for signs of infection, such as redness or bad-smelling drainage  Change your vest lining if it gets wet  Ask your healthcare provider how to care for your halo pins and vest  Ask your healthcare provider for more information about using a halo brace, semirigid collar, or soft collar  Therapy: A physical therapist and an occupational therapist may exercise your arms, legs, and hands  They may also teach you new ways to do things around the house  A speech therapist may work with you to help you talk or swallow  Wound care:  Ask your healthcare provider to show you how to care for your wound  Contact your spine specialist or healthcare provider if:   · You have a fever  · You see a skin rash, redness, or sores under your brace  · You have problems swallowing while you are wearing your halo brace  · Your neck pain is not getting better even with treatment  · You have questions or concerns about your cervical fracture, medicine, or care  Seek care immediately or call 911 if:   · You have a sudden, severe headache with nausea and vomiting  · You are seeing double or cannot see out of 1 eye  · You cannot stay awake  · The pins in your halo brace have loosened or look deeper in the skin than before  · You feel new weakness or numbness in your hands or fingers  · You are short of breath      · You cannot feel or move your arms or legs  · You cough up blood  · You feel lightheaded, short of breath, and have chest pain  You cough up blood  · You feel lightheaded, short of breath, and have chest pain  · Your arm or leg feels warm, tender, and painful  It may look swollen and red  © 2017 [address] Information is for End User's use only and may not be sold, redistributed or otherwise used for commercial purposes  All illustrations and images included in CareNotes® are the copyrighted property of [employer]  or Avelino Valencia  The above information is an  only  It is not intended as medical advice for individual conditions or treatments  Talk to your doctor, nurse or pharmacist before following any medical regimen to see if it is safe and effective for you

## 2019-02-05 NOTE — PROGRESS NOTES
02/05/19 0700   Pain Assessment   Pain Assessment 0-10   Pain Score 5   Pain Type Acute pain   Pain Location Head   Pain Frequency Intermittent   Hospital Pain Intervention(s) Distraction; Emotional support; Environmental changes   Restrictions/Precautions   Precautions Bed/chair alarms; Fall Risk;Supervision on toilet/commode;Pain  (Orthopedic Restrictions)   Weight Bearing Restrictions No   ROM Restrictions Yes   Braces or Orthoses C/S Collar   Lifestyle   Autonomy "My head really hurts today"   QI: [address] Provided by [place] No physical assistance   Eating CARE Score 6   Eating Assessment   Positioning Upright;Out of Bed   Meal Assessed Breakfast   QI: Swallowing/Nutritional Status Regular food   Eating (FIM) 6 - Patient requires increased time or safety concern   QI: Oral Hygiene   Assistance Needed Supervision   Assistance Provided by [place] No physical assistance   Comment Pt completes hygiene while in stance at sink with RW  Pt requires supervision 2* to decreased standing balance  Oral Hygiene CARE Score 4   Grooming   Able To Initiate Tasks; Acquire Items;Comb/Brush Hair;Wash/Dry Face;Brush/Clean Teeth;Wash/Dry Hands   Limitation Noted In Timeliness;Strength   Findings Pt does require Supervision for grooming activities while in stance at sink 2* to decreased standing balance, however, pt will complete grooming while seated in w/c at sink upon d/c and demos ability to complete seated grooming tasks at a Liliana level      Grooming (FIM) 6 - Patient requires assistive device/extra time/safety concerns but completes independently   QI: Shower/Bathe Self   Assistance Needed Adaptive equipment;Physical assistance   Assistance Provided by [place] No physical assistance   Shower/Bathe Self CARE Score -   Bathing   Assessed Bath Style Sponge Bath   Anticipated D/C Bath Style Shower   Able to [place] No   Able to [employer] Temperature Yes   Able to Wash/Rinse/Dry (body part) Left Arm;Right Arm;L Upper Leg;R Upper Leg;L Lower Leg/Foot;R Lower Leg/Foot;Chest;Abdomen;Perineal Area   Limitations Noted in Balance; Endurance;Timeliness;Strength   Positioning Seated;Standing   Adaptive Equipment Longhand Reacher   Findings  Pt continues to require min A for rear hygiene 2* to decreased skin integrity and decreased dynamic reach around waist for thoroughness  Pt's daughter has purchased toileting wand to increase independence with task  Recommending Home OT to further assess  Bathing (FIM) 4 - Patient completes 8/10 or 9/10 parts   Tub/Shower Transfer   Not Assessed Sponge Bath;Patient refusal   Findings Pt completed SB this session 2* to increased pain and fatigue, however, has demonstrated ability to complete shower transfer with use of bench at an overall Supervision level  Shower Transfer (FIM) 5 - Patient requires supervision/monitoring   QI: Upper Body Dressing   Assistance Needed Supervision   Assistance Provided by [place] No physical assistance   Comment Pt continues to require supervision during UB dressing 2* to decreased standing balance  Pt continues to require total assistance with C/S collar management which daughter will complete at home  Upper Body Dressing CARE Score 4   QI: Lower Body Dressing   Assistance Needed Supervision; Adaptive equipment   Assistance Provided by [place] No physical assistance   Comment Pt requires supervision for LB dressing  Pt uses LHR to thread pants over ankles  Pt demos increased ability to pull pants over hips by requiring no physical assistance  Lower Body Dressing CARE Score 4   QI: Putting On/Taking Off Footwear   Assistance Needed Supervision; Adaptive equipment   Assistance Provided by [place] No physical assistance   Comment Pt at times continues to present with difficulty managing compression stockings and tying shoe laces  Pt's daughter has confirmed that she would be willing to A PRN upon d/c with that task   Pt self reports that at baseline she did not tie her shoes and instead managed the velcro strap in back with use of shoe horn  Pt has demonstrated ability to complete donning/doffing footwear simulated to home set up with use of standard socks and her shoes with use of LHAE to complete at an overall supervision level  Putting On/Taking Off Footwear CARE Score 4   QI: Picking Up Object   Assistance Needed Supervision; Adaptive equipment   Assistance Provided by [place] No physical assistance   Comment Pt uses LHR to  socks off the floor  Picking Up Object CARE Score 4   Dressing/Undressing Clothing   Remove UB Clothes Undershirt; Other  [facility])   Remove LB Clothes Socks; [address]; [address]; Undergarment;Socks; Shoes   Limitations Noted In Balance; Endurance; Safety;ROM; Timeliness;Strength   Adaptive Equipment Reacher;Dressing Stick;LH Shoehorn   Positioning Supported Sit;Standing   UB Dressing (FIM) 5 - Patient requires supervision/monitoring   LB Dressing (FIM) 5 - Patient requires supervision/monitoring   QI: Sit to [address] Provided by [place] No physical assistance   Comment Pt continues to require supervison 2* to decreased standing balance   Sit to Stand CARE Score 4   QI: Chair/Bed-to-Chair Transfer   Assistance Needed Supervision   Assistance Provided by [place] No physical assistance   Comment Pt continues to require supervision 2* decreased standing balance   Chair/Bed-to-Chair Transfer CARE Score 4   Transfer Bed/Chair/Wheelchair   Sit to Stand Supervision   Stand to Sit Supervision   Findings Pt continues to demo increased safety awareness with RW by pushing from surface shes sitting on     Bed, Chair, Wheelchair Transfer (FIM) 5 - Patient requires supervision/monitoring   QI: [address] Provided by [place] No physical assistance   Comment Pt continues to require supervision 2* to decreased standing balance  Toileting Hygiene CARE Score 4   Toileting   Able to [address] down yes, up yes  Able to Manage Clothing Closures No   Manage Hygiene Bowel;Bladder   Limitations Noted In Balance;ROM;Safety;UE Strength;LE Strength   Adaptive Equipment Grab Bar   Findings Pt completes pants up and down but requires assistance with rear hygiene 2* decreased UE ROM for thoroughness  Toileting (FIM) 4 - Patient completes 75% of all tasks   QI: [facility] Provided by [place] No physical assistance   Toilet Transfer CARE Score 4   Toilet Transfer   Surface Assessed Standard Commode   Transfer Technique Standard   Limitations Noted In Balance; Endurance;ROM;Safety;UE Strength;LE Strength   Adaptive Equipment Grab Bar   Findings Pt continues to demo G carryover of safety during functional transfer  Toilet Transfer (FIM) 5 - Patient requires supervision/monitoring   Cognition   Overall Cognitive Status WFL   Arousal/Participation Alert; Cooperative   Attention Within functional limits   Orientation Level Oriented X4   Memory Decreased short term memory   Following Commands Follows one step commands with increased time or repetition   Activity Tolerance   Activity Tolerance Patient limited by pain   Assessment   Treatment Assessment Pt participated in skilled OT services to focus on ADL retraining and functional transfers  See above for details on ADL performance and functional transfers  Pt continues to demo increased safety awareness with functional transfers using RW  Pt limited this session by pain on right side of her head, nursing made aware  Pt made good progress towards LTGs  Pt expecting shower chair with back, folding walker tray, and RW in preparation for d/c  OT recommending supervision for ADL tasks 2* to decreased UE ROM, decreased functional cognition, and impaired standing balance   OT also recommending pt continue with home OT to further address above mentioned deficits  Pt and family receptive to all recommendations and very supportive, agreeable to A PRN upon initial d/c home  OT recommending discharge home with family support and home OT services  Prognosis Good   Plan   Progress Progressing toward goals   Recommendation   OT Discharge Recommendation Home OT   OT Therapy Minutes   OT Time In 0700   OT Time Out 0830   OT Total Time (minutes) 90   OT Mode of treatment - Individual (minutes) 90   OT Mode of treatment - Concurrent (minutes) 0   OT Mode of treatment - Group (minutes) 0   OT Mode of treatment - Co-treat (minutes) 0   OT Mode of Teatment - Total time(minutes) 90 minutes   Therapy Time missed   Time missed?  No

## 2019-02-06 VITALS
TEMPERATURE: 97.9 F | WEIGHT: 185.41 LBS | RESPIRATION RATE: 18 BRPM | SYSTOLIC BLOOD PRESSURE: 140 MMHG | DIASTOLIC BLOOD PRESSURE: 70 MMHG | BODY MASS INDEX: 37.38 KG/M2 | OXYGEN SATURATION: 98 % | HEART RATE: 66 BPM | HEIGHT: 59 IN

## 2019-02-06 LAB
ANION GAP SERPL CALCULATED.3IONS-SCNC: 9 MMOL/L (ref 4–13)
BUN SERPL-MCNC: 42 MG/DL (ref 5–25)
CALCIUM SERPL-MCNC: 8.9 MG/DL (ref 8.3–10.1)
CHLORIDE SERPL-SCNC: 102 MMOL/L (ref 100–108)
CO2 SERPL-SCNC: 26 MMOL/L (ref 21–32)
CREAT SERPL-MCNC: 1.26 MG/DL (ref 0.6–1.3)
GFR SERPL CREATININE-BSD FRML MDRD: 39 ML/MIN/1.73SQ M
GLUCOSE SERPL-MCNC: 82 MG/DL (ref 65–140)
POTASSIUM SERPL-SCNC: 4.4 MMOL/L (ref 3.5–5.3)
SODIUM SERPL-SCNC: 137 MMOL/L (ref 136–145)

## 2019-02-06 PROCEDURE — 80048 BASIC METABOLIC PNL TOTAL CA: CPT | Performed by: NURSE PRACTITIONER

## 2019-02-06 PROCEDURE — 99239 HOSP IP/OBS DSCHRG MGMT >30: CPT | Performed by: PHYSICAL MEDICINE & REHABILITATION

## 2019-02-06 RX ADMIN — METOPROLOL TARTRATE 25 MG: 25 TABLET, FILM COATED ORAL at 08:35

## 2019-02-06 RX ADMIN — ENOXAPARIN SODIUM 40 MG: 40 INJECTION SUBCUTANEOUS at 08:35

## 2019-02-06 RX ADMIN — METHOCARBAMOL 500 MG: 500 TABLET, FILM COATED ORAL at 06:41

## 2019-02-06 RX ADMIN — NYSTATIN: 100000 POWDER TOPICAL at 08:40

## 2019-02-06 RX ADMIN — AMLODIPINE BESYLATE 5 MG: 5 TABLET ORAL at 08:35

## 2019-02-06 RX ADMIN — LIDOCAINE 1 PATCH: 50 PATCH CUTANEOUS at 08:41

## 2019-02-06 RX ADMIN — HYDROXYCHLOROQUINE SULFATE 200 MG: 200 TABLET, FILM COATED ORAL at 08:36

## 2019-02-06 RX ADMIN — Medication 1000 MG: at 08:36

## 2019-02-06 RX ADMIN — LEVOTHYROXINE SODIUM 112 MCG: 112 TABLET ORAL at 06:41

## 2019-02-06 RX ADMIN — CALCIUM CARBONATE 500 MG (1,250 MG)-VITAMIN D3 200 UNIT TABLET 1 TABLET: at 06:41

## 2019-02-06 RX ADMIN — ACETAMINOPHEN 975 MG: 325 TABLET, FILM COATED ORAL at 06:41

## 2019-02-06 RX ADMIN — DOCUSATE SODIUM 100 MG: 100 CAPSULE, LIQUID FILLED ORAL at 08:35

## 2019-02-06 RX ADMIN — CLOPIDOGREL BISULFATE 75 MG: 75 TABLET ORAL at 08:35

## 2019-02-06 RX ADMIN — GABAPENTIN 600 MG: 300 CAPSULE ORAL at 08:35

## 2019-02-06 RX ADMIN — FUROSEMIDE 20 MG: 20 TABLET ORAL at 08:35

## 2019-02-06 NOTE — NURSING NOTE
Patient and family received discharge information  Patient vitals were stable, no pain reported  Patient left in wheelchair with PT, PCA and son  No questions or concerns at time of discharge

## 2019-02-06 NOTE — DISCHARGE SUMMARY
Discharge Summary - PMR   Patrica Scott 80 y o  female MRN: [id number]  Unit/Bed#: -01 Encounter: [id number]    Admission Date: 1/17/2019     Discharge Date: 2/6/19    Etiologic/Rehabilitation Diagnosis: Impairment of mobility, safety and Activities of Daily Living (ADLs) due to Orthopedic Disorders:  08 9  Other Orthopedic Type 3 odontoid fracture and C6 nondisplaced fracture after fall    HPI: Lolis Sheets a 80 y  o  female who presented to the [facility] as a trauma following a fall on Plavix  She utilizes a SPC at baseline, and had hit a wet patch on the floor causing the cane to slip from under her  Her PMH is significant for HTN with history of CVA, AVR, HTN, and T2DM  CT C-Spine revealed a Type 3 Odontoid fracture extending to the R and L superior articular facets and L transverse foramen  She also had an acute non-displaced C6 fracture  CTA Neck was negative for aneurysm or dissection and CTH was negative for acute intracranial process  Neurosurgery evaluated the patient and recommended non-operative management  She was neurologically intact  Geriatric Medicine was consulted, who noted she had a normal mini cog, but some forgetfulness at baseline  There were no significant medications noted that would contribute to her fall  Her course was complicated by pain, for which she was placed on opioids, scheduled tylenol, robaxin, and lidoderm patches  She had constipation due to her opioid use and placed on a bowel regimen of senna/colace  She had some mild fluid overload treated with Lasix, and hypertension for which they increased her home amlodipine to 10mg  She also had episodes of sundowning and hospital delirium at night  She was treated with delirium precautions - there was no evidence of an organic cause (such as an infection) contributing to her delirium  She was evaluated by PT/OT and determined to be appropriate for discharge to the [facility] on 1/17/19  Procedures Performed During ARC Admission:   - 2/5/19 Trigger point injection to R upper trapezius and splenius capitis  Acute Rehabilitation Center Course:   Patient participated in a comprehensive interdisciplinary inpatient rehabilitation program which included involvment of MD, therapies (PT, OT, and/or SLP), RN, CM, SW, dietary, and psychology services  She was able to be advanced to a supervision level of assist and considered safe for discharge home with family  Please see below for patient's day to day management of medical needs  * Ambulatory dysfunction   Assessment & Plan    Patient participated in a comprehensive inpatient rehab program with 3-5 hours a day 5-7 days a week of PT/OT  To evaluate for any further SLP needs  C6 cervical fracture (HCC)   Assessment & Plan    Non-op management  - Pain control as noted above in Type III odontoid fracture  - PT/OT     Type III fracture of odontoid process [facility])   Assessment & Plan    She maintained her cervical precautions and C-Collar ATC  - 1/28 Follow-up XR x2 showed increased anterior displacement compared to 1/11   - Neurosurgery reviewed, and felt that fracture appeared stable   - Patient remained neurologically intact with functional improvement  - Plan to follow-up 2/13 with repeat XR and Neurosurgery  Dermatitis associated with moisture   Assessment & Plan    Was seen by wound care nursing  Has two areas of raw skin on her bilateral labia, best seen posteriorly  Hydroguard is being placed on these regions  Will be receiving home wound care nursing to treat and monitor these areas  Trigger point   Assessment & Plan    During stay developed pain in her neck that radiated to her temples/side of her face  On exam, noted to have multiple trigger points, worse on the R side of her neck with twitch signs  Noted particularly in upper trapezius and splenius capitis     - Discussed with Neurosurgery who had no contraindications to treatment  - On 2/5/19, patient was consented after a discussion of risks/benefits  Trigger point injections of 0 5% lidocaine in 1cc aliquots to 2 spots in her R upper trapezius (Cervical portion), and 1 spot in her splenius capitis were targeted  She wanted to hold off on her L side at this time  - She reported 50% improvement, and would like to follow-up with rehab as an outpatient for repeat trigger injections if needed  Lesion of left lung   Assessment & Plan    CT C/A/P - 1 cm groundglass attenuation density in the right lower lobe  Suggest reassessment with repeat chest CT in 3-6 months  Pain   Assessment & Plan    Improving neck and associated myofascial pain improving  Multifactorial due to trigger points and acute injury, as well as a component of central sensitization due to her fibromyalgia  - As there is Tylenol in her home medication, she was instructed to monitor her intake and limit to 3g daily    - Robaxin 500mg QID as needed for muscle spasms  - Her gabapentin was titrated up to 600mg BID which helped significantly with her central sensitization/fibro  Dosing adjusted for kidney function  - She is titrating off her opioids  She will return to using her home medications, Hydrocodone 5mg/Tylenol 325mg which she will continue to titrate down  She was only using narcotics 2x a day at discharge  She was not given a prescription for oxycodone    - Lidocaine patch as needed for 12 hours at a time  History of aortic valve replacement with bioprosthetic valve   Assessment & Plan    Monitor cardiopulmonary status  IM to manage  - Cath without serious cardiac dz prior to her surgery  Fibromyalgia   Assessment & Plan    Continue sertraline  Her gabapentin was titrated up to 600mg BID, which she is tolerating  Consult neuropsychology for adjustment and coping skills for pain management       Rheumatoid arthritis involving multiple sites [facility])   Assessment & Plan    Continue plaquenil, and pain mgmt as outlined below     Stage 3 chronic kidney disease [facility])   Assessment & Plan    Had an episode of HERLINDA  She was followed by nephrology, and this was felt to be due to increased BP control  Crea at discharge was 1 26  - Baseline crea ~ 1    - We monitored her labs on Lasix  - Renally dosed medications  - Avoiding nephrotoxic medications  - She was instructed to get a BMP checked 1 week after discharge to monitor her sodium levels and kidney function on Lasix, and to follow-up with her PCP  - At discharge, I contacted daughter to let her know that her fluid restriction was lifted, and that Eileen Loera should get her BMP rechecked as above  - Nephrology has signed off and will follow-up with her at discharge on an as needed basis  Depression   Assessment & Plan    Continue sertraline  Type 2 diabetes mellitus with diabetic polyneuropathy [facility])   Assessment & Plan    Lab Results   Component Value Date    HGBA1C 5 2 01/18/2019     - BG were well-controlled on a diabetic diet  She is diet controlled at home  We discontinued her accuchecks/CDI  History of CVA (cerebrovascular accident)   Assessment & Plan    Old R BG stroke appreciated on recent CT head   Cannot tolerate statins  Continue Plavix  Continue fish oil  Can restart red yeast rice at discharge  Chronic diastolic heart failure (HCC)   Assessment & Plan    Continue lasix 20mg qday  Monitor kidney function on lasix   Continue ARB/BB     Renovascular hypertension   Assessment & Plan    Followed by IM/Nephro  Her BP goals were liberalized to 130/80  Her medication required adjustment  She is stable on the following regimen:  Amlodipine 5mg  Losartan 50mg  Continue metoprolol tartrate 25mg Q12  Continue Lasix 20mg daily  S/p stenting for DONAVAN       Hyponatremiaresolved as of 2/4/2019   Assessment & Plan    Resolved  - Patient continuing Fluid restriction   - Sodium tabs discontinued    - Resolved with most recent Na on 1/31/18 136 < 135 <137  Imaging per prior providers:  - CTCAP to rule out other causes - unremarkable except for possible pulmonary nodule  - CTH unremarkable except for old, stable lacunar infarct  - Remainder of work-up unrevealing    - Restarted on furosemide  - Will monitor sodium periodically  Discharge Physical Examination:  /70   Pulse 66   Temp 97 9 °F (36 6 °C) (Oral)   Resp 18   Ht 4' 11" (1 499 m)   Wt 84 1 kg (185 lb 6 5 oz)   SpO2 98%   BMI 37 45 kg/m²     Gen: No acute distress, Well-nourished, well-appearing  HEENT: Moist mucus membranes, Normocephalic/Atraumatic  C-Collar in place  No evidence of pressure injury at chin, jawline, occiput  Cardiovascular: Regular rate, rhythm, S1/S2  Distal pulses palpable  Heme/Extr: No edema/clubbing/cyanosis  Pulmonary: Non-labored breathing  Lungs CTAB  : No gomez  GI: Soft, non-tender, non-distended  BS+  MSK: AROM is WFL in all extremities  No effusions or deformities  Bulk is symmetric  See below for MMT scores  HF limited due to habitus  Integumentary: Skin is warm, dry  No rashes or ulcers  She does have bilateral labial maceration and around groin due to moisture  Clean, raw wound beds without evidence of infection  Neuro: AAOx3, CN 2-12 intact  Sensation intact to light touch throughout  Speech is intact  Appropriate to questioning  Tone is normal    MMT:   Strength:   Right  Left  Site  Right  Left  Site    5 5  S Ab: Shoulder Abductors  4*  4*  HF: Hip Flexors    5 5  EF: Elbow Flexors  5  5 KF: Knee Flexors    5  5  EE: Elbow Extensors  5  5  KE: Knee Extensors    5  5  WE: Wrist Extensors  5  5  DR: Dorsi Flexors    5  5  FF: Finger Flexors  5  5  PF: Plantar Flexors    5  5  HI: Hand Intrinsics  5  5  EHL: Extensor Hallucis Longus   Psych: Normal mood and affect       Significant Findings, Care, Treatment and Services Provided: Acute comprehensive interdisciplinary inpatient rehabilitation including PT, OT, SLP, RN, CM, SW, dietary, psychology, etc     Complications: None    Functional Status Upon Admission to [facility]:  Mobility: Deondre 10' x4 with ambulation  Transfers: Deondre  ADLs: maxA grooming, modA UB bathing, LB bathing, Deondre UB dressing, maxA LB dressing, maxA toileting, Deondre toilet transfers, A with toileting hygiene  Functional Status Upon Discharge from [facility]:   Mobility: Supervision 80' with a RW  Transfers: Supervision, Set-up for Car transfer  ADLs: modInd eating, S grooming, UB/LB dressing, toileting, tub/shower transfer, toilet transfers, bathing  Discharge Diagnosis: Impairment of mobility, safety and Activities of Daily Living (ADLs) due to Orthopedic Disorders:  08 9  Other Orthopedic Type 3 Odontoid Fracture, Non-displaced C6 fracture  Discharge Medications:   See after visit summary for reconciled discharge medications provided to patient and family  Condition at Discharge: good     Discharge instructions/Information to patient and family:   See after visit summary for information provided to patient and family  Provisions for Follow-Up Care:  See after visit summary for information related to follow-up care and any pertinent home health orders  Disposition: Home    Planned Readmission: No    Discharge Statement   I spent 45 minutes discharging the patient  This time was spent on the day of discharge  I had direct contact with the patient on the day of discharge  Greater than 50% of the total time was spent examining patient, answering all patient questions, arranging and discussing plan of care with patient as well as directly providing post-discharge instructions  Additional time then spent on discharge activities  Discharge Medications:  See after visit summary for reconciled discharge medications provided to patient and family

## 2019-02-06 NOTE — PROGRESS NOTES
Internal Medicine Progress Note  Patient: Mojgan Solano  Age/sex: 80 y o  female  Medical Record #: [id number]      ASSESSMENT/PLAN:  Mojgan Solano is seen and examined and management for following issues:    Type III odontoid fracture with fracture line extending to the right/left superior articular facets and left transverse foramen of C2; nondisplaced fracture through bulky, bridging osteophytes at the C6 level:  required no surgery  Continue collar       HTN with hx right RA stent: per renal = acceptable on Norvasc 5 mg qd, Cozaar 50mg qd, Lopressor 25mg q12hrs  Hyponatremia:  Resolved/stable; On FR 1500  Ground-glass attenuation RLL lower lung base: For repeat CT chest 3-6 months        CKD; baseline 1 0-1 1:  a little above baseline today; recheck as OP     Hx bioAVR 2012: had no significant disease when had card cath before surgery     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 2016:  continue Plavix that she has been on since CVA 2005; with her TIA 2016, neuro kept Plavix; she is intol statins and uses Red yeast rice and Fish oil at home     DM diet controlled:   watching fasting blood sugars  Stable      Chronic diastolic CHF, LVEF 42%, mild-mod MR/mild TR and norm function AVR 2016:  on Lasix 20mg qd as at home  Follows with Dr Miracle Dominguez  Family says LE edema is less than at home    CXR 1/24 was negative for CHF     RA/SLE; chr pain/fibromyalgia:  continue Plaquenil for RA; on Hydrocodone at home for chr pain      Hypothyroidism:  continue current Levothyroxine     Depression: Zoloft      Subjective:  denies any complaints currently     ROS:   GI: denies abdominal pain, change bowel habits or reflux symptoms  Neuro: +Right facial pain  Respiratory: No Cough, SOB  Cardiovascular: No CP, palpitations     Scheduled Meds:    Current Facility-Administered Medications:  acetaminophen 975 mg Oral Q8H [address] Sebastian Iyer MD   albuterol 2 puff Inhalation Q4H PRN Sebastian Iyer MD   amLODIPine 5 mg Oral Daily Mateusz Lucero MD   barium sulfate 450 mL Oral Once in imaging Snajuana Cardenas MD   calcium carbonate-vitamin D 1 tablet Oral BID With Meals Emily Addison MD   clopidogrel 75 mg Oral Daily Emily Addison MD   docusate sodium 100 mg Oral BID Emily Addison MD   enoxaparin 40 mg Subcutaneous Q24H [facility] Emily Addison MD   fish oil 1,000 mg Oral Daily Emily Addison MD   furosemide 20 mg Oral Daily Tristan Jimenez MD   gabapentin 600 mg Oral BID Emily Addison MD   hydroxychloroquine 200 mg Oral Daily With Breakfast Emily Addison MD   levothyroxine 112 mcg Oral Early Morning Emily Addison MD   lidocaine 1 patch Topical Daily Emily Addison MD   lidocaine 1 patch Topical Daily Emily Addison MD   losartan 50 mg Oral HS Sanjuana Cardenas MD   methocarbamol 500 mg Oral TID Percy Fuller MD   metoprolol tartrate 25 mg Oral Q12H [facility] Emily Addison MD   nystatin  Topical BID Emily Addison MD   oxyCODONE 2 5 mg Oral Q8H PRN Emily Addison MD   polyethylene glycol 17 g Oral Daily PRN Emily Addison MD   senna 1 tablet Oral HS Emily Addison MD   sertraline 25 mg Oral Daily Emily Addison MD   trolamine salicylate 1 application Topical 4x Daily PRN Ewelina Perales PA-C       Labs:       Results from last 7 days  Lab Units 02/04/19  0625 01/31/19  0608   WBC Thousand/uL 5 30 6 25   HEMOGLOBIN g/dL 10 3* 11 2*   HEMATOCRIT % 32 5* 35 7   PLATELETS Thousands/uL 165 181       Results from last 7 days  Lab Units 02/06/19  0545 02/04/19  0625   SODIUM mmol/L 137 135*   POTASSIUM mmol/L 4 4 4 7   CHLORIDE mmol/L 102 101   CO2 mmol/L 26 25   BUN mg/dL 42* 38*   CREATININE mg/dL 1 26 1 18   CALCIUM mg/dL 8 9 8 7                    [unfilled]    Labs reviewed    Physical Examination:  Vitals:   Vitals:    02/05/19 1411 02/05/19 2037 02/06/19 0536 02/06/19 0835   BP: 150/60 145/65 139/75 140/70   BP Location: Left arm Left arm Left arm    Pulse:  64 65 66   Resp:  19 18    Temp:  98 5 °F (36 9 °C) 97 9 °F (36 6 °C)    TempSrc:  Oral Oral    SpO2: 95% 98%    Weight:       Height:           HEENT:  No thrush  RESP: CTAB, no R/R/W; resp unlabored  CV: +S1 S2, regular rate, no rubs/murmurs  ABD: soft, NT, ND, normal BS   EXT: edema of LEs L>R is stable  Neuro: AAO; [place] 4/5      [ X ] Total time spent: 30 Mins and greater than 50% of this time was spent counseling/coordinating care  ** Please Note: Dragon 360 Dictation voice to text software may have been used in the creation of this document   **

## 2019-02-06 NOTE — TEAM CONFERENCE
Acute RehabilitationTeam Conference Note  Date: 2/6/2019   Time: 9:31 AM       Patient Name:  Mumtaz Mix       Medical Record Number: [id number]   YOB: 1934  Sex: Female          Room/Bed:  [facility] 973-01  Payor Info:  Payor: [id number] [place] [address] / Plan: [facility] REP / Product Type: Medicare HMO /      Admitting Diagnosis: Fracture Rosalea Rach  [id number]]   Admit Date/Time:  1/17/2019  4:48 PM  Admission Comments: No comment available     Primary Diagnosis:  Ambulatory dysfunction  Principal Problem: Ambulatory dysfunction    Patient Active Problem List    Diagnosis Date Noted    Trigger point 02/05/2019    Dermatitis associated with moisture 02/05/2019    Pain 01/29/2019    Lesion of left lung 01/29/2019    Renal artery stenosis ([facility] ) 01/24/2019    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    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    Chronic diastolic heart failure ([facility] ) 01/17/2019    History of CVA (cerebrovascular accident) 01/17/2019    Type 2 diabetes mellitus with diabetic polyneuropathy ([facility] ) 01/17/2019    Depression 01/17/2019    Stage 3 chronic kidney disease ([facility] ) 01/17/2019    Rheumatoid arthritis involving multiple sites ([facility] ) 01/17/2019    Fibromyalgia 01/17/2019    History of aortic valve replacement with bioprosthetic valve 01/17/2019    Fall 01/10/2019    Ambulatory dysfunction 01/10/2019    Acute pain 01/10/2019    Closed nondisplaced fracture of second cervical vertebra ([facility] ) 01/09/2019    Neck pain, acute 01/09/2019    Type III fracture of odontoid process ([facility] ) 01/09/2019    C6 cervical fracture ([facility] ) 01/09/2019    Hypertension 01/09/2019    Hypothyroidism 01/09/2019       Physical Therapy:    Weight Bearing Status: Full Weight Bearing  Transfers: Supervision  Bed Mobility:  (no bed mobility pt sleeps in recliner in her room and at home with her daugther )  Amulation Distance (ft): 150 feet  Ambulation: Supervision  Assistive Device for Ambulation: Roller Walker  Wheelchair Mobility Distance: 0 ft  Number of Stairs: 6  Assistive Device for Stairs: Lehft Hand Rail (HHA and ascending and descending sideways )  Stair Assistance: Minimal Assistance  Ramp: Minimal Assistance  Assistive Device for Ramp: Roller Walker  Discharge Recommendations: Home with:  [address] with[de-identified] Family Support, 24 Hour Supervision    1/22/19  Pt presents with decline in function  Pt c/o pain ion neck, spasms between shoulders, radiating to head limiting her ability to participate at times  Pt retropulsive with STS transfers, relying on B UE more than legs  CGA/MiN A with ambulation, but short distances only at this time  Slow progress towards LTGs  Will benefit form continued skilled therapy to maximize functional mobility, decrease  Wasta of care and risk for falls  1/31/19  Patient making good progress with skilled PT intervention  Pt overall min/CGA for activity  Pt  ambulates in slow cande but is safe with no LOB noted  Pt  cont to have some difficulty with ascending steps due to arthritis on B LE but does not have really to manage steps in her house  Pt   cont to demonstrate progress and inc activity tolerance  COnt with POC as tolerated     02/5/2019    Pt progressing toward goals today with skilled PT intervention  Pt shows good demo/ and safety awareness during ambulation and car transfers with her daughter outside today in PM session  Instructed pt daughter on car transfers outside with safety precaution to dec fall risk and pt daughter demo ,good understanding of hand and body position on pt during car transfers  Pt being D/C with all needs on 2/6        Occupational Therapy:  Eating: Modified Independent  Grooming: Supervision  Bathing: Supervision  Bathing: Supervision  Upper Body Dressing: Supervision  Lower Body Dressing: Supervision  Toileting: Supervision  Tub/Shower Transfer: Supervision  Toilet Transfer: Supervision  Cognition: Exceptions to WNL  Cognition: Decreased Memory  Orientation: Person, Place, Time, Situation  Discharge Recommendations: Home with:  [address] with[de-identified] Family Support       Pt has made G progress towards achieving LTGs  Pt continues to present with decreased activity tolerance/endurance, dec UE strength/ROM, decreased memory and pain  Pt with supportive daughter who has been present for family training on C collar management and on ADL recommendations  Pt has achieved supervision level goals  Recommending pt be dc'd home with family support  Speech Therapy:           No notes on file    Nursing Notes:  Appetite: Fair  Diet Type: Regular/House                                          Wound 01/26/19 Moisture associated skin damage Vagina Right;Left small open areas from wearing depends & + moisture (Active)   Wound Description Beefy red 2/6/2019  8:52 AM   Olga-wound Assessment Maceration 2/6/2019  8:52 AM   Drainage Amount Scant 2/5/2019  5:28 PM   Drainage Description Bloody 2/5/2019  5:28 PM   Treatments Site care 2/6/2019  8:52 AM   Dressing ABD 2/6/2019  8:52 AM   Dressing Changed Changed 2/6/2019  8:52 AM   Patient Tolerance Tolerated well 2/6/2019  8:52 AM   Dressing Status Clean;Dry; Intact 2/6/2019  8:52 AM                             Pain Location: Head, Neck  Pain Orientation: Upper  Pain Score: 2                       Hospital Pain Intervention(s): Distraction, Emotional support, Environmental changes          Admitted s/p Traumatic Type 3 odontoid fracture s/p fall-C2 and C6 fractures (non-op)  Wears Cervical collar AAT  Pain is managed with Tylenol, neurontin, lidocaine patches, robaxin and oxycodone as well as home med Aspercream  Pt has HX: CHF, HTN, anemia, B/L LE edema, hyponatremia, DM, AVR, stroke, arthritis, constipation, anxiety, depression, hypothyroid  She is currently taking Plavix for anti-coag, and uses compression stockings from home  HTN managed with lopressor, norvasc, lasix   She can be Forgetful, occasional confusion at night  And has Patient states she has facial numbness which is not new  She is on a 1200cc F R  Pt will be discharged home today with supervision  We will review medication administration and cervical precautions  We will review skin care and turning and repositioning as well as weight shifts  Followup appointments will be discussed and AVS summary will be provided        Case Management:     Discharge Planning  Living Arrangements: Children  Support Systems: [employer] community  Assistance Needed: yes  Type of Current Residence: Private residence  [address]: No  Pt has made good progress and is d/c'ing home today with family and contd [facility] services through [facility] for rn pt and ot  Family training completed and they are aware of pts functional ability  Is the patient actively participating in therapies? yes  List any modifications to the treatment plan:     Barriers Interventions   All functional barriers resolved                      Is the patient making expected progress toward goals?  yes  List any update or changes to goals:     Medical Goals: Patient will be medically stable for discharge to [facility] upon completion of rehab program and Patient will be able to manage medical conditions and comorbid conditions with medications and follow up upon completion of rehab program    Weekly Team Goals:   Rehab Team Goals  ADL Team Goal: Patient will require supervision with ADLs with least restrictive device upon completion of rehab program  Bowel/Bladder Team Goal: Patient will return to premorbid level for bladder/bowel management upon completion of rehab program  Transfer Team Goal: Patient will require supervision with transfers with least restrictive device upon completion of rehab program  Locomotion Team Goal: Patient will require supervision with locomotion with least restrictive device upon completion of rehab program  Cognitive Team Goal: Patient will be independent for basic  tasks and require supervision for complex tasks upon completion of rehab program    Discussion: pt has achieved goals of supervision and is d/c'ing home today with family and [address] [place] [address] services through [facility] for rn pt and ot  Family training completed and pts son will be providing transport home  Anticipated Discharge Date:  2/6/2019  [facility] Team Members Present: The following team members are supervising care for this patient and were present during this Weekly Team Conference      Physician: Dr Chhaya Carmona MD  : Zula Skiff, MSW  Registered Nurse: Gato Crowe RN  Physical Therapist: Starr Torres DPT  Occupational Therapist: Bridget Fuller MS, OTR/L, CBIS  Speech Therapist:   Other:

## 2019-02-07 NOTE — OCCUPATIONAL THERAPY NOTE
Occupational Therapy Discharge Summary:     Pt made good progress throughout rehab stay, from OT standpoint  Pt was D/C home with family support, home OT services  Pt's daughter was present and educated on D/C recommendations  OT recommending pt have supervision/min assistance with all ADLS and supervision for all tasks in stance and functional transfers  Pt recommended for the following DME: shower chair with back, folding PRETTY fisher, PRETTY Bernard, MS, OTR/L , CBIS

## 2019-02-07 NOTE — CASE MANAGEMENT
Team dc summary - pt made good progress and returned home w/family support and cotnd [facility] services through [facility] for rn pt and ot  Pt received a roller walker and bath seat w/back through [facility]  Family training occurred and pts son present for dc instructions and is aware of pts functional ability

## 2019-02-07 NOTE — PHYSICAL THERAPY NOTE
PT D/C SUMMARY    Pt d/c home at this time with family support and use of RW for walking and transfers  Pt able to walk at S/DS level, but cont to recommend S level for overall safety due to fracture site and need to prevent further falls  FT was repeated on multiple occasions with family with PT and OT, to review collar management, safety precautions and car transfer  Pt was limited by pain and fatigue, due to pain from fibro as well as cervical pain from this fall  Family demonstrated understanding of safety precautions and why S level is recommended  Recommending family performs household chores  Pt able to dc home with family support and cont PT services

## 2019-02-08 ENCOUNTER — TELEPHONE (OUTPATIENT)
Dept: NEUROLOGY | Facility: CLINIC | Age: 84
End: 2019-02-08

## 2019-02-08 NOTE — TELEPHONE ENCOUNTER
----- Message from Danielle Champagne MD sent at 2/5/2019  4:05 PM EST -----  Regarding: HFU   Please make appointment for patient to follow-up with Dr Abby Shankar in 2-3 weeks  Thank you!

## 2019-02-16 ENCOUNTER — APPOINTMENT (OUTPATIENT)
Dept: RADIOLOGY | Age: 84
End: 2019-02-16
Payer: COMMERCIAL

## 2019-02-16 DIAGNOSIS — S12.120A CLOSED ODONTOID FRACTURE WITH TYPE III MORPHOLOGY, INITIAL ENCOUNTER (HCC): ICD-10-CM

## 2019-02-16 DIAGNOSIS — S12.101A CLOSED NONDISPLACED FRACTURE OF SECOND CERVICAL VERTEBRA (HCC): ICD-10-CM

## 2019-02-16 PROCEDURE — 72040 X-RAY EXAM NECK SPINE 2-3 VW: CPT

## 2019-02-19 ENCOUNTER — OFFICE VISIT (OUTPATIENT)
Dept: NEUROSURGERY | Facility: CLINIC | Age: 84
End: 2019-02-19
Payer: COMMERCIAL

## 2019-02-19 VITALS
SYSTOLIC BLOOD PRESSURE: 130 MMHG | TEMPERATURE: 97.8 F | HEIGHT: 59 IN | BODY MASS INDEX: 39.31 KG/M2 | WEIGHT: 195 LBS | DIASTOLIC BLOOD PRESSURE: 68 MMHG | HEART RATE: 64 BPM

## 2019-02-19 DIAGNOSIS — S12.190D OTHER CLOSED DISPLACED FRACTURE OF SECOND CERVICAL VERTEBRA WITH ROUTINE HEALING, SUBSEQUENT ENCOUNTER: Primary | ICD-10-CM

## 2019-02-19 DIAGNOSIS — S12.501D CLOSED NONDISPLACED FRACTURE OF SIXTH CERVICAL VERTEBRA WITH ROUTINE HEALING, UNSPECIFIED FRACTURE MORPHOLOGY, SUBSEQUENT ENCOUNTER: ICD-10-CM

## 2019-02-19 PROCEDURE — 99213 OFFICE O/P EST LOW 20 MIN: CPT | Performed by: PHYSICIAN ASSISTANT

## 2019-02-19 NOTE — PROGRESS NOTES
Assessment/Plan:       Diagnoses and all orders for this visit:    Other closed displaced fracture of second cervical vertebra with routine healing, subsequent encounter  -     Cervical Collar Pads  -     CT cervical spine without contrast; Future    Closed nondisplaced fracture of sixth cervical vertebra with routine healing, unspecified fracture morphology, subsequent encounter  -     Cervical Collar Pads  -     CT cervical spine without contrast; Future          Discussion / Summary: This is a 80 y o  female  who presents for follow up for C2 fracture and C6 fracture  Patient has been maintained in c-collar for approximately 5 1/2 weeks  C-spine xray ( 2/16/19)  was reviewed in detail by Dr Suresh Santiago and the type III odontoid fracture with anterior displacement of odontoid process in relation to the body of C2 appears stable in comparison to previous c-spine xray 1/28/19  There is presence of additional healing of the nondisplaced anterior bridging osteophyte fracture C6 when comparing the 2/16/19 C-spine xray to prior c-spine xray 1/28/19  Continued management with C-collar is advised at this juncture  She is to wear Vista c-collar at all times  She reports she has two [place] c-collars as she changes Vista c-collar after showering  She was provided with spare Vista c-collar pads while at office today  She is advised no strenuous activity  Patient advised to refrain from lifting, pulling, or pushing more then 10 lbs  No driving in setting of having to wear c-collar  Recommend patient take fall precautions  Recommend patient refrain from activity that increases chance of falling or injury to neck  Patient is to follow up in 4 weeks with CT C-spnie to be completed a few days prior to follow up visit    (Pending clinical status and result of C-spine CT to consider if able to wean from c-collar after follow up visit )    Explained to patient and daughter that can not rule out the possible need for surgery in future if there is not sufficient healing of cervical fractures  Ms Ainsley Santiago is advised to follow up with her Primary Care Provider for evaluation / management of cerumen in ears and for history of osteoporosis  Patient is to contact our office or present to hospital Emergency Room if experience worsening or new neck pain, sensory or motor change, bladder or bowel dysfunction, or other neurological change  Patient and her daughter expressed understanding and agreement     ______________________________________________________________________________________      Subjective:      Patient ID: Adrienne Fong is a 80 y o  female who presents for hospital consult (1/10/19) follow up for traumatic type III C2 fracture with displacement and for nondisplaced fracture of C6 bridging osteophyte which was diagnosed s/p fall  Ms Ainsley Santiago was in [facility] 1/9/19 - 1/17/19 and then transferred to [facility]  She was in [facility] until discharge home w/ daughter 2/6/19  Ms Ainsley Santiago had f/u C-spine xray 2/16/19  Ms Ainsley Santiago is accompanied at office with her daughter Missy Soliz)  HPI  Patient reports compliance with wearing Vista cervical collar at all times  She reports she has 2 Vista cervical collars as she changes the collar after showering  Patient reports she really does not have neck pain  She does report some discomfort posterior to her ears and throbbing discomfort towards the front of her head  She reports this is overall been improving over the last week  Patient and her daughter reports the methocarbamol has seemed to help her pain  She takes methocarbamol 500 mg 1 tablet q i d  She reports taking Norco 5/325 mg 1 tablet Q 6 hr p r n   (Per daughter Ms Ainsley Santiago had been on Norco 1 tab TID prior to hospitalization)  Patient denies radicular arm pain  She denies numbness, tingling, or weakness of her upper extremities or lower extremities      She reports she is eating well and denies swallowing issues  She ambulates with a roller walker  Patient reports she lives in an apartment at her daughter's home  Patient/daughter reports she was evaluated by home physical therapy and was cleared by home PT  Home OT consult is pending  Daughter reports OT cancelled home OT evaluation last week and has to be rescheduled  Ms Angelica Saavedra daughter reports that patient has been dressing and feeding herself without issue  Patient reports history of urinary urgency and some urinary stress incontinence leakage upon standing for about 1-2 years  She denies changes in her bladder function from her baseline  She denies bowel function dysfunction  She denies saddle anesthesia  She takes calcium plus vitamin-D  The following portions of the patient's history were reviewed and updated as appropriate: allergies, current medications, past family history, past medical history, past social history and past surgical history  Review of Systems   Constitutional: Negative for fever  HENT:        See HPI   Eyes: Negative for visual disturbance  Respiratory: Negative for shortness of breath  Cardiovascular: Negative for chest pain  Gastrointestinal: Negative for nausea and vomiting  Denies bowel dysfunction    Genitourinary:        See HPI   Musculoskeletal:        See HPI   Neurological:        See HPI    Psychiatric/Behavioral: Negative for agitation  Objective:      /68 (BP Location: Left arm, Patient Position: Sitting, Cuff Size: Standard)   Pulse 64   Temp 97 8 °F (36 6 °C) (Temporal)   Ht 4' 11" (1 499 m)   Wt 88 5 kg (195 lb)   BMI 39 39 kg/m²          Physical Exam   Constitutional: She appears well-developed and well-nourished  No distress  Wearing Vista c-collar  HENT:     Cerumen in bilateral EAC  Cerumen obscures TMs bilaterally  Eyes: Conjunctivae are normal  No scleral icterus  Cardiovascular: Normal rate     Pulmonary/Chest: No respiratory distress  Musculoskeletal:        Cervical back: She exhibits no tenderness and no deformity  Neurological:   Reflex Scores:       Tricep reflexes are 2+ on the right side and 2+ on the left side  Bicep reflexes are 2+ on the right side and 2+ on the left side  Brachioradialis reflexes are 2+ on the right side and 2+ on the left side  Patellar reflexes are 1+ on the right side and 1+ on the left side  Achilles reflexes are 1+ on the right side and 1+ on the left side  Skin: Skin is warm and dry  No breakdown of skin underlying c-collar   Psychiatric: Her speech is normal        Neurologic Exam     Mental Status   Speech: speech is normal   Level of consciousness: alert    Motor Exam     Motor:  Shoulder abduction 5/5 bilaterally  Elbow flexion/extension 5/5 bilaterally  Wrist flexion/extension 5/5 bilaterally  Finger  / abduction 5/5 bilaterally  Hip flexion 4/5 bilaterally  Knee flexion/extension 5/5 bilaterally  Ankle DF/PF 5/5 bilaterally  Great toe DF 5/5 bilaterally  Sensory Exam     Sensation to pinprick intact b/l upper extremities, torso, and b/l lower extremities  JPS and Vibratory sense intact b/l thumbs and great toes         Gait, Coordination, and Reflexes     Gait  Gait: (Ambaltes steady with roller walker )    Reflexes   Right brachioradialis: 2+  Left brachioradialis: 2+  Right biceps: 2+  Left biceps: 2+  Right triceps: 2+  Left triceps: 2+  Right patellar: 1+  Left patellar: 1+  Right achilles: 1+  Left achilles: 1+  Right plantar: normal  Left plantar: normal  Right Anderson: absent  Left Anderson: absent  Right ankle clonus: absent  Left ankle clonus: absent        Imaging study:    2/16/19 [facility] C-spine xray -- per report: " CERVICAL SPINE     INDICATION:   S12 101A: Unspecified nondisplaced fracture of second cervical vertebra, initial encounter for closed fracture  S12 120A: Other displaced dens fracture, initial encounter for closed fracture      COMPARISON:  Cervical spine x-ray dated January 28, 2019      VIEWS:  XR SPINE CERVICAL 2 OR 3 VW INJURY         FINDINGS:     No evidence of acute fracture  Again noted is anterior displacement of the odontoid process in relation to the body of C2 similar to the prior exam and this patient with a history of type III odontoid fracture  Reidentified is a nondisplaced fracture   of an anterior C6 bridging osteophyte      Normal alignment without subluxation      The intervertebral disc spaces are preserved  Moderate-sized multilevel anterior flowing vertebral body osteophytes are again noted    Multilevel spondylotic degenerative changes of the cervical spine are reidentified      The prevertebral soft tissues are within normal limits        The lung apices are clear      IMPRESSION:     No acute osseous abnormality      Stable type III odontoid fracture with anterior displacement of the odontoid process in relation to the body of C2 when compared to cervical spine x-ray dated January 28, 2019      Stable nondisplaced anterior bridging osteophyte at C6            Workstation performed: [id number] "

## 2019-02-19 NOTE — CONSULTS
Consultation/Progress note - Nils Jacobs 80 y o  female MRN: [id number]    Unit/Bed#: -01 Encounter: [id number]      Assessment/Plan     Assessment:  CLINICAL PROGRESS NOTE     Start Time: 9:00am   Stop Time: 9:45am      Time: 45 min       CPT Code: 91772     Functional Status, Objectives, Interventions, and Assessment:  Pt participated in psychology services to address coping skills, adjustment, and motivation in rehab  Pt addressed goals and interventions listed below in plan during session effectively  Pt reported improvements in mood and utilization of coping skills     Dx: F43 21 Adjustment disorder with depressed mood          Appearance  __X__Neat ____Disheveled ____Overweight ____Underweight ____Frail  __X__Casual     Speech  __X__Fluid, Articulate __X__Spontaneous ____Circumlocutions ____Word Finding difficulties  ____Dysarthria ____Circumstantial ____Paucity ____Perseverative  ____Pressured     Thought Process/Content  __X__Coherent____Preoccupations____Ruminations____Obsessions____Hallucinations  ____Delusions ____Flight of Ideas ____Distortions ____Distracted ____Suicidal Ideation  ____Homicidal Ideation     Interaction  __X__Engaged__X__Cooperative____Superficial____Detached____Fearful____Guarded  ____Suspicious ____Poor Becca Irons ____Aggressive     Affect  ____Neutral__X__Positive____Anxiety____Worried____Irritable____Angry____Dysphoric  __X__Euthymic     Behavior  __X__Spontaneous __X__Purposeful ____Slowed Initiation ____Apathetic ____Impulsive  ____Dyscontrolled ____Hypoactive ____Hyperactive ____Repetitive/Perseverative        BMI: 39 39 kg/m²              Overall Progress:  ____Very Declined ____Declined ____Stable __X__Improved ____Very Improved     Motivation and Participation:  ____Very Poor ____Poor ____Fair ____Good __X__Very Codie Jauregui     Projected Number of Sessions to Attain Goals: 6     Date of Next Session (if confirmed):     Continued Treatment is medically necessary to:  __X__Maintain current progress  __X__Prevent regression  ____Prevent hospitalization  ____Prevent relapse     Medications:  __X__ Reviewed current prescribed medications and there are no changes in dosage, frequency, method of administration since last appointment  (see below)  __X__ Reviewed current OTC medications and there are no changes in dosage, frequency, method of administration since last appointment  __X__ Reviewed current herbal supplements and vitamins and there are no changes in dosage, frequency, that of administration since last appointment      Depression Screen: GDS- no change       Unhealthy Alcohol Use Screen: CAGE- no change           Plan:  PSYCHOTHERAPY TREATMENT PLAN AND RECOMMENDATIONS     1-Treatment Modality and Frequency:  __X__CBT ____Cognitive Rehabilitation ____Integrative ____Psychodynamic ____Behavioral____ Existential ____Health and Behavior ____Biofeedback ____Addictions Treatment ____Marital and Family ____Other:  Weekly and PRN sessions while pt is at [facility]       2-Treatment Plan and Goals:     Problem: Improve coping skills      Goal: Pt will identify three CBT skills in sessions      Intervention: 1, 7, 10, 14, 19, 24, 25, 42, 43, 51      Target Date: 2/6/2019        Problem: Adjustment disorder with depressed mood      Goal: Pt will address medical issues related to adjustment issues as evidenced by pt report during sessions       Intervention: 1, 14, 16, 19, 22, 42, 50, 51      Target Date: 2/6/2019        Problem:  Motivation in rehab sessions      Goal: Pt will improve motivation in rehab sessions as evidenced by treatment team report       Intervention: 1, 11, 14, 17, 21, 31, 42, 50, 51       Target Date: 2/6/2019              3-Discharge criteria:  ____ Treatment Goals Reached __X__Maximum Benefit Achieved ____Health Condition Improved     4-Further Evaluation: N/A  ____Psychological ____Neuropsychological ____Educational ____Career ____Other:     5-Referrals: N/A ____Medical Specialist ____Psychiatry ____Support Group ____Self-Help ____Community Resources ____Other:     6-Treatment plan and recommendations discussed and patient understands and consents to treatment:  __X__Yes ____No     7-I have communicated the results of this evaluation with the patient PCP or referring physician:  ___X_Yes ____No (Explain why or why not)        Interventions Code Key*         1  Affect Identification & Expression 14  Coping Skills Training 27  Journal Assignments 40  Progressive Muscle Relaxation   2  Anger Management 15  Crisis Management 28  Journaling 41  Psychodrama   3  Assertiveness Training 16  Critical Incident Debriefing 29  Lifestyle management 42  Psychoeducation   4  Assigned Readings 17  Decision Option 30  Memory Enhancement Training 43  Refuting Irrational Thoughts   5  Assigned tasks 18  Engage Sig Others in Treatment 31  Motivational Interview 44  Reminiscence Techniques   6  Autogenic Training 19  Exploration 32  Motivational Interviewing 45  Role Play Techniques   7  Breathing Exercises 20  Facilitate decision making 33  Organizational Skills Training 46  Self-Hypnosis   8  CBT-I 21  Goal setting & time management 34  Other (specify) 47  Self-Other Boundaries Training   9  Cognitive Rehabilitation 22  Grief Work 35  Pain Management 48  Social Skills Training   10  Cognitive Restructuring 23  Guided Imagery 36  Parent Training 49  Solution Focused Techniques   11  Communication Training 24  Identify Automatic Thoughts 37  Pattern Identification & Interruption 50  Stress Management   12  Community support group 25  Identify Cognitive Distortions 38  Preventative strategies 51  Supportive Therapy   13  Conflict Resolution Skills 26  Job stress management 39  Problem Solving Skills Training 52   Thought stopping Techniques                Consults    Review of Systems    Historical Information   Past Medical History:   Diagnosis Date    Cardiac disease     aortic valve transplant    CHF (congestive heart failure) (HCC)     Diabetes mellitus ([address] )     Disease of thyroid gland     Hyperlipidemia     Hypertension     Renal disorder     kidney stent    Stroke [facility])      History reviewed  No pertinent surgical history    Social History   Social History     Substance and Sexual Activity   Alcohol Use No     Social History     Substance and Sexual Activity   Drug Use No     Social History     Tobacco Use   Smoking Status Unknown If Ever Smoked   Smokeless Tobacco Never Used   current meds:               Current Facility-Administered Medications   Medication Dose Route Frequency    acetaminophen (TYLENOL) tablet 975 mg  975 mg Oral Q8H [address]    albuterol (PROVENTIL HFA,VENTOLIN HFA) inhaler 2 puff  2 puff Inhalation Q4H PRN    amLODIPine (NORVASC) tablet 10 mg  10 mg Oral Daily    calcium carbonate-vitamin D (OSCAL-D) 500 mg-200 units per tablet 1 tablet  1 tablet Oral BID With Meals    clopidogrel (PLAVIX) tablet 75 mg  75 mg Oral Daily    docusate sodium (COLACE) capsule 100 mg  100 mg Oral BID    enoxaparin (LOVENOX) subcutaneous injection 40 mg  40 mg Subcutaneous Q24H [address]    fish oil capsule 1,000 mg  1,000 mg Oral Daily    furosemide (LASIX) tablet 20 mg  20 mg Oral Daily    gabapentin (NEURONTIN) capsule 300 mg  300 mg Oral BID    gabapentin (NEURONTIN) capsule 600 mg  600 mg Oral HS    hydroxychloroquine (PLAQUENIL) tablet 200 mg  200 mg Oral Daily With Breakfast    levothyroxine tablet 112 mcg  112 mcg Oral Early Morning    lidocaine (LIDODERM) 5 % patch 1 patch  1 patch Topical Daily    lidocaine (LIDODERM) 5 % patch 1 patch  1 patch Topical Daily    losartan (COZAAR) tablet 100 mg  100 mg Oral Daily    methocarbamol (ROBAXIN) tablet 500 mg  500 mg Oral Q6H PRN    metoprolol tartrate (LOPRESSOR) tablet 25 mg  25 mg Oral Q12H KARINE    nystatin (MYCOSTATIN) cream   Topical BID    nystatin (MYCOSTATIN) powder   Topical BID    oxyCODONE (ROXICODONE) IR tablet 2 5 mg  2 5 mg Oral Q4H PRN    oxyCODONE (ROXICODONE) IR tablet 2 5 mg  2 5 mg Oral Q4H PRN    polyethylene glycol (MIRALAX) packet 17 g  17 g Oral Daily PRN    senna (SENOKOT) tablet 8 6 mg  1 tablet Oral HS    sertraline (ZOLOFT) tablet 25 mg  25 mg Oral Daily    trolamine salicylate (ASPERCREME) 10 % cream 1 application  1 application Topical 4x Daily PRN           Allergies   Allergen Reactions    Duloxetine Hcl Other (See Comments) and Hypertension    Duloxetine Hcl      Cymbalta; Hemorrhagic stroke listed as reaction    Escitalopram      Other reaction(s): Urinary Retention    Pregabalin      Other reaction(s): Hypertension    Statins Myalgia    Tramadol      Other reaction(s): Hypertension    Triprolidine-Pse Other (See Comments)    Anastrozole Abdominal Pain    Anastrozole     Exemestane      Aromasin; Muscle pain & cramps    Lexapro [Escitalopram]      Urinary retention    Lyrica [Pregabalin]      hypertension    Metformin      diarrhea    Statins      Muscle pain & cramps    Tramadol      hypertension    Triprolidine-Pseudoephedrine     Metformin Diarrhea       Objective   Vitals: Blood pressure 140/70, pulse 66, temperature 97 9 °F (36 6 °C), temperature source Oral, resp  rate 18, height 4' 11" (1 499 m), weight 84 1 kg (185 lb 6 5 oz), SpO2 98 %, not currently breastfeeding    Invasive Devices          None          Physical Exam

## 2019-02-19 NOTE — CONSULTS
Consultation/Progress note - Ba Romeo 80 y o  female MRN: [id number]    Unit/Bed#: -01 Encounter: [id number]      Assessment/Plan     Assessment:  CLINICAL PROGRESS NOTE    Start Time: 10:30am   Stop Time: 11:15am     Time: 45 min  CPT Code: 15459    Functional Status, Objectives, Interventions, and Assessment:  Pt participated in psychology services to address coping skills, adjustment, and motivation in rehab  Pt addressed goals and interventions listed below in plan during session effectively  Dx: I58 31 Adjustment disorder with depressed mood           Appearance  __X__Neat ____Disheveled ____Overweight ____Underweight ____Frail  __X__Casual    Speech  __X__Fluid, Articulate __X__Spontaneous ____Circumlocutions ____Word Finding difficulties  ____Dysarthria ____Circumstantial ____Paucity ____Perseverative  ____Pressured    Thought Process/Content  __X__Coherent____Preoccupations____Ruminations____Obsessions____Hallucinations  ____Delusions ____Flight of Ideas ____Distortions ____Distracted ____Suicidal Ideation  ____Homicidal Ideation    Interaction  __X__Engaged__X__Cooperative____Superficial____Detached____Fearful____Guarded  ____Suspicious ____Poor Boundaries ____Aggressive    Affect  ____Neutral____Positive____Anxiety__X__Worried____Irritable____Angry____Dysphoric  __X__Euthymic    Behavior  __X__Spontaneous __X__Purposeful ____Slowed Initiation ____Apathetic ____Impulsive  ____Dyscontrolled ____Hypoactive ____Hyperactive ____Repetitive/Perseverative      BMI: 39 39 kg/m²          Overall Progress:  ____Very Declined ____Declined __X__Stable ____Improved ____Very Improved    Motivation and Participation:  ____Very Poor ____Poor ____Fair __X__Good ____Very Good    Projected Number of Sessions to Attain Goals: 6    Date of Next Session (if confirmed):    Continued Treatment is medically necessary to:  __X__Maintain current progress  __X__Prevent regression  ____Prevent hospitalization  ____Prevent relapse    Medications:  __X__ Reviewed current prescribed medications and there are no changes in dosage, frequency, method of administration since last appointment  (see below)  __X__ Reviewed current OTC medications and there are no changes in dosage, frequency, method of administration since last appointment  __X__ Reviewed current herbal supplements and vitamins and there are no changes in dosage, frequency, that of administration since last appointment  Depression Screen: GDS- no change  Unhealthy Alcohol Use Screen: CAGE- no change  Plan:  PSYCHOTHERAPY TREATMENT PLAN AND RECOMMENDATIONS    1-Treatment Modality and Frequency:  __X__CBT ____Cognitive Rehabilitation ____Integrative ____Psychodynamic ____Behavioral____ Existential ____Health and Behavior ____Biofeedback ____Addictions Treatment ____Marital and Family ____Other:  Weekly and PRN sessions while pt is at [facility]  2-Treatment Plan and Goals:    Problem: Improve coping skills     Goal: Pt will identify three CBT skills in sessions  Intervention: 1, 7, 10, 14, 19, 24, 25, 42, 43, 51  Target Date: 2/6/2019      Problem: Adjustment disorder with depressed mood     Goal: Pt will address medical issues related to adjustment issues as evidenced by pt report during sessions  Intervention: 1, 14, 16, 19, 22, 42, 50, 51  Target Date: 2/6/2019      Problem: Motivation in rehab sessions  Goal: Pt will improve motivation in rehab sessions as evidenced by treatment team report  Intervention: 1, 11, 14, 17, 21, 31, 42, 50, 51       Target Date: 2/6/2019          3-Discharge criteria:  ____ Treatment Goals Reached __X__Maximum Benefit Achieved ____Health Condition Improved    4-Further Evaluation: N/A  ____Psychological ____Neuropsychological ____Educational ____Career ____Other:    5-Referrals: N/A   ____Medical Specialist ____Psychiatry ____Support Group ____Self-Help ____Community Resources ____Other:    6-Treatment plan and recommendations discussed and patient understands and consents to treatment:  __X__Yes ____No    7-I have communicated the results of this evaluation with the patient PCP or referring physician:  ___X_Yes ____No (Explain why or why not)      Interventions Code Key*      1  Affect Identification & Expression 14  Coping Skills Training 27  Journal Assignments 40  Progressive Muscle Relaxation   2  Anger Management 15  Crisis Management 28  Journaling 41  Psychodrama   3  Assertiveness Training 16  Critical Incident Debriefing 29  Lifestyle management 42  Psychoeducation   4  Assigned Readings 17  Decision Option 30  Memory Enhancement Training 43  Refuting Irrational Thoughts   5  Assigned tasks 18  Engage Sig Others in Treatment 31  Motivational Interview 44  Reminiscence Techniques   6  Autogenic Training 19  Exploration 32  Motivational Interviewing 45  Role Play Techniques   7  Breathing Exercises 20  Facilitate decision making 33  Organizational Skills Training 46  Self-Hypnosis   8  CBT-I 21  Goal setting & time management 34  Other (specify) 47  Self-Other Boundaries Training   9  Cognitive Rehabilitation 22  Grief Work 35  Pain Management 48  Social Skills Training   10  Cognitive Restructuring 23  Guided Imagery 36  Parent Training 49  Solution Focused Techniques   11  Communication Training 24  Identify Automatic Thoughts 37  Pattern Identification & Interruption 50  Stress Management   12  Community support group 25  Identify Cognitive Distortions 38  Preventative strategies 51  Supportive Therapy   13  Conflict Resolution Skills 26  Job stress management 39  Problem Solving Skills Training 52   Thought stopping Techniques       Consults    Review of Systems    Historical Information   Past Medical History:   Diagnosis Date    Cardiac disease     aortic valve transplant    [facility]     [facility] mellitus [facility] )     Disease of thyroid gland     Hyperlipidemia     Hypertension     Renal disorder     kidney stent    Stroke [facility])      History reviewed  No pertinent surgical history    Social History   Social History     Substance and Sexual Activity   Alcohol Use No     Social History     Substance and Sexual Activity   Drug Use No     Social History     Tobacco Use   Smoking Status Unknown If Ever Smoked   Smokeless Tobacco Never Used   Meds/Allergies   current meds:          Current Facility-Administered Medications   Medication Dose Route Frequency    acetaminophen (TYLENOL) tablet 975 mg  975 mg Oral Q8H [address]    albuterol (PROVENTIL HFA,VENTOLIN HFA) inhaler 2 puff  2 puff Inhalation Q4H PRN    amLODIPine (NORVASC) tablet 10 mg  10 mg Oral Daily    calcium carbonate-vitamin D (OSCAL-D) 500 mg-200 units per tablet 1 tablet  1 tablet Oral BID With Meals    clopidogrel (PLAVIX) tablet 75 mg  75 mg Oral Daily    docusate sodium (COLACE) capsule 100 mg  100 mg Oral BID    enoxaparin (LOVENOX) subcutaneous injection 40 mg  40 mg Subcutaneous Q24H [address]    fish oil capsule 1,000 mg  1,000 mg Oral Daily    furosemide (LASIX) tablet 20 mg  20 mg Oral Daily    gabapentin (NEURONTIN) capsule 300 mg  300 mg Oral BID    gabapentin (NEURONTIN) capsule 600 mg  600 mg Oral HS    hydroxychloroquine (PLAQUENIL) tablet 200 mg  200 mg Oral Daily With Breakfast    levothyroxine tablet 112 mcg  112 mcg Oral Early Morning    lidocaine (LIDODERM) 5 % patch 1 patch  1 patch Topical Daily    lidocaine (LIDODERM) 5 % patch 1 patch  1 patch Topical Daily    losartan (COZAAR) tablet 100 mg  100 mg Oral Daily    methocarbamol (ROBAXIN) tablet 500 mg  500 mg Oral Q6H PRN    metoprolol tartrate (LOPRESSOR) tablet 25 mg  25 mg Oral Q12H KARINE    nystatin (MYCOSTATIN) cream   Topical BID    nystatin (MYCOSTATIN) powder   Topical BID    oxyCODONE (ROXICODONE) IR tablet 2 5 mg  2 5 mg Oral Q4H PRN    oxyCODONE (ROXICODONE) IR tablet 2 5 mg  2 5 mg Oral Q4H PRN    polyethylene glycol (MIRALAX) packet 17 g  17 g Oral Daily PRN    senna (SENOKOT) tablet 8 6 mg  1 tablet Oral HS    sertraline (ZOLOFT) tablet 25 mg  25 mg Oral Daily    trolamine salicylate (ASPERCREME) 10 % cream 1 application  1 application Topical 4x Daily PRN        Allergies   Allergen Reactions    Duloxetine Hcl Other (See Comments) and Hypertension    Duloxetine Hcl      Cymbalta; Hemorrhagic stroke listed as reaction    Escitalopram      Other reaction(s): Urinary Retention    Pregabalin      Other reaction(s): Hypertension    Statins Myalgia    Tramadol      Other reaction(s): Hypertension    Triprolidine-Pse Other (See Comments)    Anastrozole Abdominal Pain    Anastrozole     Exemestane      Aromasin; Muscle pain & cramps    Lexapro [Escitalopram]      Urinary retention    Lyrica [Pregabalin]      hypertension    Metformin      diarrhea    Statins      Muscle pain & cramps    Tramadol      hypertension    Triprolidine-Pseudoephedrine     Metformin Diarrhea       Objective   Vitals: Blood pressure 140/70, pulse 66, temperature 97 9 °F (36 6 °C), temperature source Oral, resp  rate 18, height 4' 11" (1 499 m), weight 84 1 kg (185 lb 6 5 oz), SpO2 98 %, not currently breastfeeding    Invasive Devices          None          Physical Exam

## 2019-02-19 NOTE — LETTER
February 20, 2019     Susy Hinojosa,   [address] [facility] [address]    Patient: Sylwia Darden   YOB: 1934   Date of Visit: 2/19/2019       Dear Dr Susanna Leonard Recipients: Thank you for referring Roberto Alegria to me for evaluation  Below are my notes for this consultation  If you have questions, please do not hesitate to call me  I look forward to following your patient along with you  Sincerely,        Geremias Ann PA-C        CC: No Recipients  Geremias Ann PA-C  2/20/2019  6:35 AM  Sign at close encounter  Assessment/Plan:       Diagnoses and all orders for this visit:    Other closed displaced fracture of second cervical vertebra with routine healing, subsequent encounter  -     Cervical Collar Pads  -     CT cervical spine without contrast; Future    Closed nondisplaced fracture of sixth cervical vertebra with routine healing, unspecified fracture morphology, subsequent encounter  -     Cervical Collar Pads  -     CT cervical spine without contrast; Future          Discussion / Summary: This is a 80 y o  female  who presents for follow up for C2 fracture and C6 fracture  Patient has been maintained in c-collar for approximately 5 1/2 weeks  C-spine xray ( 2/16/19)  was reviewed in detail by Dr Rohan Velazquez and the type III odontoid fracture with anterior displacement of odontoid process in relation to the body of C2 appears stable in comparison to previous c-spine xray 1/28/19  There is presence of additional healing of the nondisplaced anterior bridging osteophyte fracture C6 when comparing the 2/16/19 C-spine xray to prior c-spine xray 1/28/19  Continued management with C-collar is advised at this juncture  She is to wear Vista c-collar at all times  She reports she has two Benjamin c-collars as she changes Vista c-collar after showering  She was provided with spare Vista c-collar pads while at office today  She is advised no strenuous activity   Patient advised to refrain from lifting, pulling, or pushing more then 10 lbs  No driving in setting of having to wear c-collar  Recommend patient take fall precautions  Recommend patient refrain from activity that increases chance of falling or injury to neck  Patient is to follow up in 4 weeks with CT C-spnie to be completed a few days prior to follow up visit  (Pending clinical status and result of C-spine CT to consider if able to wean from c-collar after follow up visit )    Explained to patient and daughter that can not rule out the possible need for surgery in future if there is not sufficient healing of cervical fractures  Ms Tiburcio Prado is advised to follow up with her Primary Care Provider for evaluation / management of cerumen in ears and for history of osteoporosis  Patient is to contact our office or present to hospital Emergency Room if experience worsening or new neck pain, sensory or motor change, bladder or bowel dysfunction, or other neurological change  Patient and her daughter expressed understanding and agreement     ______________________________________________________________________________________      Subjective:      Patient ID: Susie Joseph is a 80 y o  female who presents for hospital consult (1/10/19) follow up for traumatic type III C2 fracture with displacement and for nondisplaced fracture of C6 bridging osteophyte which was diagnosed s/p fall  Ms Tiburcio Prado was in [address] [place] [address] 1/9/19 - 1/17/19 and then transferred to [facility]  She was in [facility] until discharge home w/ daughter 2/6/19  Ms Tiburcio Prado had f/u C-spine xray 2/16/19  Ms Tiburcio Prado is accompanied at office with her daughter Rodrick Chamorro)  HPI  Patient reports compliance with wearing Vista cervical collar at all times  She reports she has 2 Vista cervical collars as she changes the collar after showering  Patient reports she really does not have neck pain    She does report some discomfort posterior to her ears and throbbing discomfort towards the front of her head  She reports this is overall been improving over the last week  Patient and her daughter reports the methocarbamol has seemed to help her pain  She takes methocarbamol 500 mg 1 tablet q i d  She reports taking Norco 5/325 mg 1 tablet Q 6 hr p r n   (Per daughter Ms Adina Davila had been on Norco 1 tab TID prior to hospitalization)  Patient denies radicular arm pain  She denies numbness, tingling, or weakness of her upper extremities or lower extremities  She reports she is eating well and denies swallowing issues  She ambulates with a roller walker  Patient reports she lives in an apartment at her daughter's home  Patient/daughter reports she was evaluated by home physical therapy and was cleared by home PT  Home OT consult is pending  Daughter reports OT cancelled home OT evaluation last week and has to be rescheduled  Ms Lazarus Riis daughter reports that patient has been dressing and feeding herself without issue  Patient reports history of urinary urgency and some urinary stress incontinence leakage upon standing for about 1-2 years  She denies changes in her bladder function from her baseline  She denies bowel function dysfunction  She denies saddle anesthesia  She takes calcium plus vitamin-D  The following portions of the patient's history were reviewed and updated as appropriate: allergies, current medications, past family history, past medical history, past social history and past surgical history  Review of Systems   Constitutional: Negative for fever  HENT:        See HPI   Eyes: Negative for visual disturbance  Respiratory: Negative for shortness of breath  Cardiovascular: Negative for chest pain  Gastrointestinal: Negative for nausea and vomiting          Denies bowel dysfunction    Genitourinary:        See HPI   Musculoskeletal:        See HPI   Neurological:        See HPI    Psychiatric/Behavioral: Negative for agitation  Objective:      /68 (BP Location: Left arm, Patient Position: Sitting, Cuff Size: Standard)   Pulse 64   Temp 97 8 °F (36 6 °C) (Temporal)   Ht 4' 11" (1 499 m)   Wt 88 5 kg (195 lb)   BMI 39 39 kg/m²           Physical Exam   Constitutional: She appears well-developed and well-nourished  No distress  Wearing Vista c-collar  HENT:     Cerumen in bilateral EAC  Cerumen obscures TMs bilaterally  Eyes: Conjunctivae are normal  No scleral icterus  Cardiovascular: Normal rate  Pulmonary/Chest: No respiratory distress  Musculoskeletal:        Cervical back: She exhibits no tenderness and no deformity  Neurological:   Reflex Scores:       Tricep reflexes are 2+ on the right side and 2+ on the left side  Bicep reflexes are 2+ on the right side and 2+ on the left side  Brachioradialis reflexes are 2+ on the right side and 2+ on the left side  Patellar reflexes are 1+ on the right side and 1+ on the left side  Achilles reflexes are 1+ on the right side and 1+ on the left side  Skin: Skin is warm and dry  No breakdown of skin underlying c-collar   Psychiatric: Her speech is normal        Neurologic Exam     Mental Status   Speech: speech is normal   Level of consciousness: alert    Motor Exam     Motor:  Shoulder abduction 5/5 bilaterally  Elbow flexion/extension 5/5 bilaterally  Wrist flexion/extension 5/5 bilaterally  Finger  / abduction 5/5 bilaterally  Hip flexion 4/5 bilaterally  Knee flexion/extension 5/5 bilaterally  Ankle DF/PF 5/5 bilaterally  Great toe DF 5/5 bilaterally  Sensory Exam     Sensation to pinprick intact b/l upper extremities, torso, and b/l lower extremities  JPS and Vibratory sense intact b/l thumbs and great toes         Gait, Coordination, and Reflexes     Gait  Gait: (Ambaltes steady with roller walker )    Reflexes   Right brachioradialis: 2+  Left brachioradialis: 2+  Right biceps: 2+  Left biceps: 2+  Right triceps: 2+  Left triceps: 2+  Right patellar: 1+  Left patellar: 1+  Right achilles: 1+  Left achilles: 1+  Right plantar: normal  Left plantar: normal  Right Anderson: absent  Left Anderson: absent  Right ankle clonus: absent  Left ankle clonus: absent        Imaging study:    2/16/19 [facility] C-spine xray -- per report: " CERVICAL SPINE     INDICATION:   S12 101A: Unspecified nondisplaced fracture of second cervical vertebra, initial encounter for closed fracture  S12 120A: Other displaced dens fracture, initial encounter for closed fracture      COMPARISON:  Cervical spine x-ray dated January 28, 2019      VIEWS:  XR SPINE CERVICAL 2 OR 3 VW INJURY         FINDINGS:     No evidence of acute fracture  Again noted is anterior displacement of the odontoid process in relation to the body of C2 similar to the prior exam and this patient with a history of type III odontoid fracture  Reidentified is a nondisplaced fracture   of an anterior C6 bridging osteophyte      Normal alignment without subluxation      The intervertebral disc spaces are preserved  Moderate-sized multilevel anterior flowing vertebral body osteophytes are again noted    Multilevel spondylotic degenerative changes of the cervical spine are reidentified      The prevertebral soft tissues are within normal limits        The lung apices are clear      IMPRESSION:     No acute osseous abnormality      Stable type III odontoid fracture with anterior displacement of the odontoid process in relation to the body of C2 when compared to cervical spine x-ray dated January 28, 2019      Stable nondisplaced anterior bridging osteophyte at C6            Workstation performed: [id number] "

## 2019-02-19 NOTE — CONSULTS
Consultation/Progress note - Cletis Denver 80 y o  female MRN: [id number]    Unit/Bed#: -01 Encounter: [id number]      Assessment/Plan     Assessment:  CLINICAL PROGRESS NOTE     Start Time: 10:30am   Stop Time: 11:15am      Time: 45 min       CPT EHFU: 14114     Functional Status, Objectives, Interventions, and Assessment:  Pt participated in psychology services to address coping skills, adjustment, and motivation in rehab  Pt addressed goals and interventions listed below in plan during session effectively  Pt reported continued improvements in motivation in rehab sessions, coping skills, and mood     Dx: F43 21 Adjustment disorder with depressed mood          Appearance  __X__Neat ____Disheveled ____Overweight ____Underweight ____Frail  __X__Casual     Speech  __X__Fluid, Articulate __X__Spontaneous ____Circumlocutions ____Word Finding difficulties  ____Dysarthria ____Circumstantial ____Paucity ____Perseverative  ____Pressured     Thought Process/Content  __X__Coherent____Preoccupations____Ruminations____Obsessions____Hallucinations  ____Delusions ____Flight of Ideas ____Distortions ____Distracted ____Suicidal Ideation  ____Homicidal Ideation     Interaction  __X__Engaged__X__Cooperative____Superficial____Detached____Fearful____Guarded  ____Suspicious ____Poor Molina Hoops ____Aggressive     Affect  ____Neutral__X__Positive____Anxiety____Worried____Irritable____Angry____Dysphoric  __X__Euthymic     Behavior  __X__Spontaneous __X__Purposeful ____Slowed Initiation ____Apathetic ____Impulsive  ____Dyscontrolled ____Hypoactive ____Hyperactive ____Repetitive/Perseverative        BMI: 39 39 kg/m²              Overall Progress:  ____Very Declined ____Declined ____Stable ____Improved ___X_Very Improved     Motivation and Participation:  ____Very Poor ____Poor ____Fair ____Good __X__Very Klein Salvage     Projected [facility] of Sessions to [facility]     Date of Next Session (if confirmed):     Continued Treatment is medically necessary to:  __X__Maintain current progress  __X__Prevent regression  ____Prevent hospitalization  ____Prevent relapse     Medications:  __X__ Reviewed current prescribed medications and there are no changes in dosage, frequency, method of administration since last appointment  (see below)  __X__ Reviewed current OTC medications and there are no changes in dosage, frequency, method of administration since last appointment    __X__ Reviewed current herbal supplements and vitamins and there are no changes in dosage, frequency, that of administration since last appointment      Depression Screen: GDS- not given; last score 1/21/2019      Unhealthy Alcohol Use Screen: CAGE- no change            Plan:  PSYCHOTHERAPY TREATMENT PLAN AND RECOMMENDATIONS     1-Treatment Modality and Frequency:  __X__CBT ____Cognitive Rehabilitation ____Integrative ____Psychodynamic ____Behavioral____ Existential ____Health and Behavior ____Biofeedback ____Addictions Treatment ____Marital and Family ____Other:    Weekly and PRN sessions while pt is at [facility]       2-Treatment Plan and Goals:     Problem: Improve coping skills      Goal: Pt will identify three CBT skills in sessions      Intervention: 1, 7, 10, 14, 19, 24, 25, 42, 43, 51      Target Date: 2/6/2019        Problem: Adjustment disorder with depressed mood      Goal: Pt will address medical issues related to adjustment issues as evidenced by pt report during sessions       Intervention: 1, 14, 16, 19, 22, 42, 50, 51      Target Date: 2/6/2019        Problem: Motivation in rehab sessions      Goal: Pt will improve motivation in rehab sessions as evidenced by treatment team report       Intervention: 1, 11, 14, 17, 21, 31, 42, 50, 51       Target Date: 2/6/2019              3-Discharge criteria:  ____ Treatment Goals Reached __X__Maximum Benefit Achieved ____Health Condition Improved     4-Further Evaluation: N/A  ____Psychological ____Neuropsychological ____Educational ____Career ____Other:     5-Referrals: N/A   ____Medical Specialist ____Psychiatry ____Support Group ____Self-Help ____Community Resources ____Other:     6-Treatment plan and recommendations discussed and patient understands and consents to treatment:  __X__Yes ____No     7-I have communicated the results of this evaluation with the patient PCP or referring physician:  ___X_Yes ____No (Explain why or why not)        Interventions Code Key*         1  Affect Identification & Expression 14  Coping Skills Training 27  Journal Assignments 40  Progressive Muscle Relaxation   2  Anger Management 15  Crisis Management 28  Journaling 41  Psychodrama   3  Assertiveness Training 16  Critical Incident Debriefing 29  Lifestyle management 42  Psychoeducation   4  Assigned Readings 17  Decision Option 30  Memory Enhancement Training 43  Refuting Irrational Thoughts   5  Assigned tasks 18  Engage Sig Others in Treatment 31  Motivational Interview 44  Reminiscence Techniques   6  Autogenic Training 19  Exploration 32  Motivational Interviewing 45  Role Play Techniques   7  Breathing Exercises 20  Facilitate decision making 33  Organizational Skills Training 46  Self-Hypnosis   8  CBT-I 21  Goal setting & time management 34  Other (specify) 47  Self-Other Boundaries Training   9  Cognitive Rehabilitation 22  Grief Work 35  Pain Management 48  Social Skills Training   10  Cognitive Restructuring 23  Guided Imagery 36  Parent Training 49  Solution Focused Techniques   11  Communication Training 24  Identify Automatic Thoughts 37  Pattern Identification & Interruption 50  Stress Management   12  Community support group 25  Identify Cognitive Distortions 38  Preventative strategies 51  Supportive Therapy   13  Conflict Resolution Skills 26  Job stress management 39  Problem Solving Skills Training 52   Thought stopping Techniques             Consults    Review of Systems    Historical Information   Past Medical History:   Diagnosis Date    Cardiac disease     aortic valve transplant    CHF (congestive heart failure) (HCC)     Diabetes mellitus ([address] )     Disease of thyroid gland     Hyperlipidemia     Hypertension     Renal disorder     kidney stent    Stroke [facility])      History reviewed  No pertinent surgical history    Social History   Social History     Substance and Sexual Activity   Alcohol Use No     Social History     Substance and Sexual Activity   Drug Use No     Social History     Tobacco Use   Smoking Status Unknown If Ever Smoked   Smokeless Tobacco Never Used   current meds:               Current Facility-Administered Medications   Medication Dose Route Frequency    acetaminophen (TYLENOL) tablet 975 mg  975 mg Oral Q8H [address]    albuterol (PROVENTIL HFA,VENTOLIN HFA) inhaler 2 puff  2 puff Inhalation Q4H PRN    amLODIPine (NORVASC) tablet 10 mg  10 mg Oral Daily    calcium carbonate-vitamin D (OSCAL-D) 500 mg-200 units per tablet 1 tablet  1 tablet Oral BID With Meals    clopidogrel (PLAVIX) tablet 75 mg  75 mg Oral Daily    docusate sodium (COLACE) capsule 100 mg  100 mg Oral BID    enoxaparin (LOVENOX) subcutaneous injection 40 mg  40 mg Subcutaneous Q24H [address]    fish oil capsule 1,000 mg  1,000 mg Oral Daily    furosemide (LASIX) tablet 20 mg  20 mg Oral Daily    gabapentin (NEURONTIN) capsule 300 mg  300 mg Oral BID    gabapentin (NEURONTIN) capsule 600 mg  600 mg Oral HS    hydroxychloroquine (PLAQUENIL) tablet 200 mg  200 mg Oral Daily With Breakfast    levothyroxine tablet 112 mcg  112 mcg Oral Early Morning    lidocaine (LIDODERM) 5 % patch 1 patch  1 patch Topical Daily    lidocaine (LIDODERM) 5 % patch 1 patch  1 patch Topical Daily    losartan (COZAAR) tablet 100 mg  100 mg Oral Daily    methocarbamol (ROBAXIN) tablet 500 mg  500 mg Oral Q6H PRN    metoprolol tartrate (LOPRESSOR) tablet 25 mg  25 mg Oral Q12H KARINE    nystatin (MYCOSTATIN) cream   Topical BID    nystatin (MYCOSTATIN) powder   Topical BID    oxyCODONE (ROXICODONE) IR tablet 2 5 mg  2 5 mg Oral Q4H PRN    oxyCODONE (ROXICODONE) IR tablet 2 5 mg  2 5 mg Oral Q4H PRN    polyethylene glycol (MIRALAX) packet 17 g  17 g Oral Daily PRN    senna (SENOKOT) tablet 8 6 mg  1 tablet Oral HS    sertraline (ZOLOFT) tablet 25 mg  25 mg Oral Daily    trolamine salicylate (ASPERCREME) 10 % cream 1 application  1 application Topical 4x Daily PRN       Allergies   Allergen Reactions    Duloxetine Hcl Other (See Comments) and Hypertension    Duloxetine Hcl      Cymbalta; Hemorrhagic stroke listed as reaction    Escitalopram      Other reaction(s): Urinary Retention    Pregabalin      Other reaction(s): Hypertension    Statins Myalgia    Tramadol      Other reaction(s): Hypertension    Triprolidine-Pse Other (See Comments)    Anastrozole Abdominal Pain    Anastrozole     Exemestane      Aromasin; Muscle pain & cramps    Lexapro [Escitalopram]      Urinary retention    Lyrica [Pregabalin]      hypertension    Metformin      diarrhea    Statins      Muscle pain & cramps    Tramadol      hypertension    Triprolidine-Pseudoephedrine     Metformin Diarrhea       Objective   Vitals: Blood pressure 140/70, pulse 66, temperature 97 9 °F (36 6 °C), temperature source Oral, resp  rate 18, height 4' 11" (1 499 m), weight 84 1 kg (185 lb 6 5 oz), SpO2 98 %, not currently breastfeeding    Invasive Devices          None          Physical Exam

## 2019-02-19 NOTE — PATIENT INSTRUCTIONS
Wear Vista C-collar at all times  You were provided with extra Vista c-collar pads at office today  Refrain from strenuous activity  Limit lifting, pulling, pushing to no more then 10 lbs  No driving in setting of having to wear C-collar  Recommend you take fall precautions and refrain from activity that increases chance of fall or injury to neck  Follow up your Primary Care Provider for evaluation / management of ear wax in both ears and for history of osteoporosis  Contact our office or present to Emergency Room if experience worsening or new neck pain, sensory or motor change, bladder or bowel function change, or other neurological change

## 2019-02-25 ENCOUNTER — TELEPHONE (OUTPATIENT)
Dept: NEUROLOGY | Facility: CLINIC | Age: 84
End: 2019-02-25

## 2019-02-25 NOTE — TELEPHONE ENCOUNTER
LYNNE:    Received a call from Shane w/[facility] (home care)  He wanted to inform Dr Niki Arguelles that he saw the patient today and she is "doing great" and is being discharged from therapy

## 2019-03-15 ENCOUNTER — OFFICE VISIT (OUTPATIENT)
Dept: NEUROLOGY | Facility: CLINIC | Age: 84
End: 2019-03-15
Payer: COMMERCIAL

## 2019-03-15 VITALS
RESPIRATION RATE: 12 BRPM | WEIGHT: 203 LBS | SYSTOLIC BLOOD PRESSURE: 118 MMHG | DIASTOLIC BLOOD PRESSURE: 58 MMHG | HEIGHT: 59 IN | BODY MASS INDEX: 40.92 KG/M2

## 2019-03-15 DIAGNOSIS — S12.120A CLOSED ODONTOID FRACTURE WITH TYPE III MORPHOLOGY, INITIAL ENCOUNTER (HCC): ICD-10-CM

## 2019-03-15 DIAGNOSIS — R52 PAIN: ICD-10-CM

## 2019-03-15 DIAGNOSIS — S12.501D CLOSED NONDISPLACED FRACTURE OF SIXTH CERVICAL VERTEBRA WITH ROUTINE HEALING, UNSPECIFIED FRACTURE MORPHOLOGY, SUBSEQUENT ENCOUNTER: ICD-10-CM

## 2019-03-15 PROCEDURE — 99213 OFFICE O/P EST LOW 20 MIN: CPT | Performed by: PHYSICAL MEDICINE & REHABILITATION

## 2019-03-15 RX ORDER — METHOCARBAMOL 500 MG/1
500 TABLET, FILM COATED ORAL 3 TIMES DAILY PRN
Qty: 90 TABLET | Refills: 0 | Status: SHIPPED | OUTPATIENT
Start: 2019-03-15 | End: 2019-11-06 | Stop reason: HOSPADM

## 2019-03-15 NOTE — PROGRESS NOTES
Physical Medicine & Rehabilitation New Patient Evaluation  Neal Leal 80 y o  female      ASSESSMENT/PLAN:     Dwayne Rdz 80 y  o  female who presented to the [facility] as a trauma following a fall on Plavix  She utilizes a SPC at baseline, and had hit a wet patch on the floor causing the cane to slip from under her  Her PMH is significant for HTN with history of CVA, AVR, HTN, and T2DM  CT C-Spine revealed a Type 3 Odontoid fracture extending to the R and L superior articular facets and L transverse foramen  She also had an acute non-displaced C6 fracture  CTA Neck was negative for aneurysm or dissection and CTH was negative for acute intracranial process  Neurosurgery evaluated the patient and recommended non-operative management  She was neurologically intact  Patinet present today for physiatry follow-up  After [facility], she was in home therapies and these are finishing up  She is accompanied by her daughter today  Patient and daughter feel she is almost back to her baseline functionally in regards to mobility and self care  She continues to have neck and upper shoulder/back pain, overall improving from initial fall and hospitalization  Plan:   -Rehab of Odontoid fx and C6 fracture: At this time, no further therapy needed  When C-collar is eventually discontinued by NSGY, patient would benefit from PT for improved cervical ROM  -Today, provided counselling on her diagnoses and answered questions     -Pain: Patient at this time was provided with refill for Robaxin at a lower frequency at 500mg TID prn  At baseline she is on Norco and Neurontin for RA  -Patient may benefit from another TPI in the future if needed  -RTC in 2-3 months  Diagnoses and all orders for this visit:    Closed odontoid fracture with type III morphology, initial encounter ([facility] [address] )  -     methocarbamol (ROBAXIN) 500 mg tablet;  Take 1 tablet (500 mg total) by mouth 3 (three) times a day as needed for muscle spasms    Closed nondisplaced fracture of sixth cervical vertebra with routine healing, unspecified fracture morphology, subsequent encounter  -     methocarbamol (ROBAXIN) 500 mg tablet; Take 1 tablet (500 mg total) by mouth 3 (three) times a day as needed for muscle spasms    Pain  -     methocarbamol (ROBAXIN) 500 mg tablet; Take 1 tablet (500 mg total) by mouth 3 (three) times a day as needed for muscle spasms             *I have spent 60 minutes with Patient and family today in which greater than 50% of this time was spent in counseling/coordination of care regarding Patient and family education and Impressions  HPI:   Farhana Davila 80 y o  female right handed, with  has a past medical history of Cardiac disease, CHF (congestive heart failure) ([facility] [address] ), Diabetes mellitus ([facility] [address] ), Disease of thyroid gland, Hyperlipidemia, Hypertension, Renal disorder, and Stroke ([facility] [address] )  Old records were reviewed personally  Imaging: I personally reviewed pertinent imaging      Expanded Social History:  Patient lives with her family in a home with first floor set up       Function:   Current Level of Function:   Mod I with RW with mobility and self care - needs extra time  Does have Min A with bathing and donning/doffing collar     Review of Systems   Constitutional: Negative  HENT: Negative  Eyes: Negative  Respiratory: Negative  Cardiovascular: Negative  Gastrointestinal: Negative  Endocrine: Negative  Genitourinary: Negative  Musculoskeletal: Positive for back pain, gait problem and neck pain  Head pain (right side), pressure and sharp pain (right side of face) Shoulder pain,    Skin: Negative  Allergic/Immunologic: Negative  Neurological: Positive for headaches  Hematological: Negative  Psychiatric/Behavioral: Negative  All other systems reviewed and are negative    ROS reviewed and updated     OBJECTIVE:   /58 (BP Location: Left arm, Patient Position: Sitting, Cuff Size: Standard)   Resp 12   Ht 4' 11" (1 499 m)   Wt 92 1 kg (203 lb)   BMI 41 00 kg/m²      Physical Exam   Constitutional: She appears well-developed and well-nourished  HENT:   Head: Normocephalic and atraumatic  C-Collar in place    Eyes: Pupils are equal, round, and reactive to light  EOM are normal    Cardiovascular: Normal rate and regular rhythm  Pulmonary/Chest: Breath sounds normal  She has no wheezes  She has no rales  Abdominal: Soft  Bowel sounds are normal  She exhibits no distension  There is no tenderness  Skin: Skin is warm  Psychiatric: She has a normal mood and affect  Nursing note and vitals reviewed      Neuro:  AAOx3  Gait: Slow pattern step thought with RW  Motor Exam:   4/5 throughout    Labs:   Lab Results   Component Value Date    WBC 5 30 02/04/2019    HGB 10 3 (L) 02/04/2019    HCT 32 5 (L) 02/04/2019     (H) 02/04/2019     02/04/2019     Lab Results   Component Value Date    GLUCOSE 178 (H) 01/09/2019    CALCIUM 8 9 02/06/2019     11/06/2015    K 4 4 02/06/2019    CO2 26 02/06/2019     02/06/2019    BUN 42 (H) 02/06/2019    CREATININE 1 26 02/06/2019         Past Medical History:   Diagnosis Date    Cardiac disease     aortic valve transplant    CHF (congestive heart failure) ([address] )     Diabetes mellitus ([facility] [address] )     Disease of thyroid gland     Hyperlipidemia     Hypertension     Renal disorder     kidney stent    Stroke [facility])        Patient Active Problem List    Diagnosis Date Noted    Trigger point 02/05/2019    Dermatitis associated with moisture 02/05/2019    Pain 01/29/2019    Lesion of left lung 01/29/2019    Renal artery stenosis ([address] ) 01/24/2019    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    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    Chronic diastolic heart failure ([address] ) 01/17/2019    History of CVA (cerebrovascular accident) 01/17/2019    Type 2 diabetes mellitus with diabetic polyneuropathy ([place] [address] ) 01/17/2019    Depression 01/17/2019    Stage 3 chronic kidney disease ([place] [address] ) 01/17/2019    Rheumatoid arthritis involving multiple sites ([place] [address] ) 01/17/2019    Fibromyalgia 01/17/2019    History of aortic valve replacement with bioprosthetic valve 01/17/2019    Fall 01/10/2019    Ambulatory dysfunction 01/10/2019    Acute pain 01/10/2019    Closed nondisplaced fracture of second cervical vertebra ([place] [address] ) 01/09/2019    Neck pain, acute 01/09/2019    Type III fracture of odontoid process ([place] [address] ) 01/09/2019    C6 cervical fracture ([place] [address] ) 01/09/2019    Hypertension 01/09/2019    Hypothyroidism 01/09/2019    Bilateral malignant neoplasm of breast in female, estrogen receptor positive ([place] [address] ) 07/10/2018    TIA (transient ischemic attack) 09/23/2016    UTI (urinary tract infection) 09/23/2016    Hypertension 09/23/2016    Diabetes ([place] [address] ) 09/23/2016    Hypothyroidism 09/23/2016    HX: breast cancer 09/23/2016    H/O: CVA (cerebrovascular accident) 09/23/2016    Osteoporosis 09/23/2016    Arthritis 09/23/2016    Fibromyalgia 09/23/2016       History reviewed  No pertinent surgical history  History reviewed  No pertinent family history  Social History     Allergies   Allergen Reactions    Duloxetine Hcl Other (See Comments) and Hypertension    Duloxetine Hcl      Cymbalta;  Hemorrhagic stroke listed as reaction    Escitalopram      Other reaction(s): Urinary Retention    Pregabalin      Other reaction(s): Hypertension    Statins Myalgia    Tramadol      Other reaction(s): Hypertension    Triprolidine-Pse Other (See Comments)    Anastrozole Abdominal Pain    Anastrozole     Exemestane      Aromasin; Muscle pain & cramps    Lexapro [Escitalopram]      Urinary retention    Lyrica [Pregabalin]      hypertension    Metformin      diarrhea    Statins      Muscle pain & cramps    Tramadol      hypertension    Triprolidine-Pseudoephedrine     Metformin Diarrhea         Current Outpatient Medications:     albuterol (PROVENTIL HFA,VENTOLIN HFA) 90 mcg/act inhaler, Inhale 2 puffs every 4 (four) hours as needed for wheezing, Disp: , Rfl:     amLODIPine (NORVASC) 5 mg tablet, Take 5 mg by mouth 2 (two) times a day , Disp: , Rfl:     calcium carbonate-vitamin D (OSCAL-D) 500 mg-200 units per tablet, Take 1 tablet by mouth 2 (two) times a day with meals  , Disp: , Rfl:     clopidogrel (PLAVIX) 75 mg tablet, Take 75 mg by mouth daily , Disp: , Rfl:     docusate sodium (COLACE) 100 mg capsule, Take 1 capsule (100 mg total) by mouth 2 (two) times a day as needed for constipation (Patient taking differently: Take 100 mg by mouth daily ), Disp: 30 capsule, Rfl: 0    furosemide (LASIX) 20 mg tablet, Take 20 mg by mouth daily , Disp: , Rfl:     gabapentin (NEURONTIN) 300 mg capsule, Take 2 capsules (600 mg total) by mouth 2 (two) times a day, Disp: 30 capsule, Rfl: 0    HYDROcodone-acetaminophen (NORCO) 5-325 mg per tablet, Take 1 tablet by mouth every 6 (six) hours as needed , Disp: , Rfl:     hydroxychloroquine (PLAQUENIL) 200 mg tablet, Take 200 mg by mouth daily , Disp: , Rfl:     Levothyroxine Sodium 112 MCG CAPS, Take by mouth daily, Disp: , Rfl:     lidocaine (LIDODERM) 5 %, , Disp: , Rfl:     lidocaine (LIDODERM) 5 %, Apply 2 patches topically daily Remove & Discard patch within 12 hours or as directed by MD  , Disp: , Rfl:     losartan (COZAAR) 100 MG tablet, Take 0 5 tablets (50 mg total) by mouth daily, Disp: 15 tablet, Rfl: 0    methocarbamol (ROBAXIN) 500 mg tablet, Take 1 tablet (500 mg total) by mouth 3 (three) times a day as needed for muscle spasms, Disp: 90 tablet, Rfl: 0    metoprolol tartrate (LOPRESSOR) 25 mg tablet, Take 25 mg by mouth 2 (two) times a day , Disp: , Rfl:     Multiple Vitamins-Calcium (MULTI-DAY/CALCIUM/EXTRA IRON PO), Take 1 tablet by mouth daily, Disp: , Rfl:     Multiple Vitamins-Calcium (ONE-A-DAY WOMENS PO), Take by mouth, Disp: , Rfl:     nystatin (MYCOSTATIN) powder, Apply topically as needed , Disp: , Rfl:     Omega-3 Fatty Acids (FISH OIL) 1,000 mg, Take 1,000 mg by mouth daily, Disp: , Rfl:     Red Yeast Rice 600 MG CAPS, Take by mouth , Disp: , Rfl:     sertraline (ZOLOFT) 25 mg tablet, 25 mg daily at bedtime , Disp: , Rfl:     SYNTHROID 112 MCG tablet, Take 112 mcg by mouth daily , Disp: , Rfl:     HYDROcodone-acetaminophen (NORCO) 5-325 mg per tablet, Take 1 tablet by mouth every 6 (six) hours as needed for pain, Disp: , Rfl:     LORazepam (ATIVAN) 0 5 mg tablet, Take 0 5 mg by mouth, Disp: , Rfl:     nitroglycerin (NITROSTAT) 0 4 mg SL tablet, Place under the tongue , Disp: , Rfl:

## 2019-03-22 ENCOUNTER — TELEPHONE (OUTPATIENT)
Dept: NEUROLOGY | Facility: CLINIC | Age: 84
End: 2019-03-22

## 2019-03-29 ENCOUNTER — HOSPITAL ENCOUNTER (OUTPATIENT)
Dept: RADIOLOGY | Age: 84
Discharge: HOME | End: 2019-03-29
Payer: COMMERCIAL

## 2019-03-29 DIAGNOSIS — S12.190D OTHER CLOSED DISPLACED FRACTURE OF SECOND CERVICAL VERTEBRA WITH ROUTINE HEALING, SUBSEQUENT ENCOUNTER: ICD-10-CM

## 2019-03-29 DIAGNOSIS — S12.501D CLOSED NONDISPLACED FRACTURE OF SIXTH CERVICAL VERTEBRA WITH ROUTINE HEALING, UNSPECIFIED FRACTURE MORPHOLOGY, SUBSEQUENT ENCOUNTER: ICD-10-CM

## 2019-03-29 PROCEDURE — 72125 CT NECK SPINE W/O DYE: CPT

## 2019-04-02 ENCOUNTER — OFFICE VISIT (OUTPATIENT)
Dept: NEUROSURGERY | Facility: CLINIC | Age: 84
End: 2019-04-02
Payer: COMMERCIAL

## 2019-04-02 VITALS
BODY MASS INDEX: 40.16 KG/M2 | DIASTOLIC BLOOD PRESSURE: 70 MMHG | TEMPERATURE: 97.8 F | HEART RATE: 68 BPM | SYSTOLIC BLOOD PRESSURE: 120 MMHG | HEIGHT: 59 IN | WEIGHT: 199.2 LBS

## 2019-04-02 DIAGNOSIS — S12.090G: ICD-10-CM

## 2019-04-02 DIAGNOSIS — S12.130K: Primary | ICD-10-CM

## 2019-04-02 PROCEDURE — 99214 OFFICE O/P EST MOD 30 MIN: CPT | Performed by: PHYSICIAN ASSISTANT

## 2019-04-02 RX ORDER — CHLORHEXIDINE GLUCONATE 0.12 MG/ML
15 RINSE ORAL ONCE
Status: CANCELLED | OUTPATIENT
Start: 2019-04-02 | End: 2019-04-02

## 2019-04-09 ENCOUNTER — TELEPHONE (OUTPATIENT)
Dept: NEUROSURGERY | Facility: CLINIC | Age: 84
End: 2019-04-09

## 2019-04-09 ENCOUNTER — TRANSCRIBE ORDERS (OUTPATIENT)
Dept: LAB | Facility: HOSPITAL | Age: 84
End: 2019-04-09

## 2019-04-09 ENCOUNTER — TELEPHONE (OUTPATIENT)
Dept: NEUROLOGY | Facility: CLINIC | Age: 84
End: 2019-04-09

## 2019-04-09 ENCOUNTER — APPOINTMENT (OUTPATIENT)
Dept: LAB | Facility: HOSPITAL | Age: 84
End: 2019-04-09
Attending: PHYSICAL MEDICINE & REHABILITATION
Payer: COMMERCIAL

## 2019-04-09 DIAGNOSIS — E11.49 DIABETIC NEUROPATHY WITH NEUROLOGIC COMPLICATION (HCC): ICD-10-CM

## 2019-04-09 DIAGNOSIS — R53.83 TIREDNESS: Primary | ICD-10-CM

## 2019-04-09 DIAGNOSIS — E11.40 DIABETIC NEUROPATHY WITH NEUROLOGIC COMPLICATION (HCC): ICD-10-CM

## 2019-04-09 DIAGNOSIS — M32.9 SLE (SYSTEMIC LUPUS ERYTHEMATOSUS RELATED SYNDROME) (HCC): ICD-10-CM

## 2019-04-09 DIAGNOSIS — E03.9 ACQUIRED HYPOTHYROIDISM: ICD-10-CM

## 2019-04-09 DIAGNOSIS — M81.0 SENILE OSTEOPOROSIS: ICD-10-CM

## 2019-04-09 DIAGNOSIS — Z95.2 HEART VALVE REPLACED BY TRANSPLANT: ICD-10-CM

## 2019-04-09 DIAGNOSIS — S12.090G: ICD-10-CM

## 2019-04-09 DIAGNOSIS — E87.1 HYPONATREMIA: ICD-10-CM

## 2019-04-09 DIAGNOSIS — R53.83 TIREDNESS: ICD-10-CM

## 2019-04-09 LAB
25(OH)D3 SERPL-MCNC: 32.1 NG/ML (ref 30–100)
ABO GROUP BLD: NORMAL
ALBUMIN SERPL BCP-MCNC: 3.9 G/DL (ref 3.5–5)
ALP SERPL-CCNC: 60 U/L (ref 46–116)
ALT SERPL W P-5'-P-CCNC: 22 U/L (ref 12–78)
ANION GAP SERPL CALCULATED.3IONS-SCNC: 6 MMOL/L (ref 4–13)
APTT PPP: 29 SECONDS (ref 26–38)
AST SERPL W P-5'-P-CCNC: 19 U/L (ref 5–45)
BASOPHILS # BLD AUTO: 0.06 THOUSANDS/ΜL (ref 0–0.1)
BASOPHILS NFR BLD AUTO: 1 % (ref 0–1)
BILIRUB SERPL-MCNC: 0.4 MG/DL (ref 0.2–1)
BILIRUB UR QL STRIP: NEGATIVE
BLD GP AB SCN SERPL QL: NEGATIVE
BUN SERPL-MCNC: 30 MG/DL (ref 5–25)
C3 SERPL-MCNC: 140 MG/DL (ref 90–180)
C4 SERPL-MCNC: 39 MG/DL (ref 10–40)
CALCIUM SERPL-MCNC: 9.7 MG/DL (ref 8.3–10.1)
CHLORIDE SERPL-SCNC: 104 MMOL/L (ref 100–108)
CHOLEST SERPL-MCNC: 193 MG/DL (ref 50–200)
CLARITY UR: CLEAR
CO2 SERPL-SCNC: 28 MMOL/L (ref 21–32)
COLOR UR: YELLOW
CREAT SERPL-MCNC: 1.21 MG/DL (ref 0.6–1.3)
CRP SERPL QL: 4.9 MG/L
EOSINOPHIL # BLD AUTO: 0.2 THOUSAND/ΜL (ref 0–0.61)
EOSINOPHIL NFR BLD AUTO: 4 % (ref 0–6)
ERYTHROCYTE [DISTWIDTH] IN BLOOD BY AUTOMATED COUNT: 13.3 % (ref 11.6–15.1)
ERYTHROCYTE [SEDIMENTATION RATE] IN BLOOD: 56 MM/HOUR (ref 0–20)
EST. AVERAGE GLUCOSE BLD GHB EST-MCNC: 91 MG/DL
GFR SERPL CREATININE-BSD FRML MDRD: 41 ML/MIN/1.73SQ M
GLUCOSE P FAST SERPL-MCNC: 83 MG/DL (ref 65–99)
GLUCOSE UR STRIP-MCNC: NEGATIVE MG/DL
HBA1C MFR BLD: 4.8 % (ref 4.2–6.3)
HCT VFR BLD AUTO: 37.4 % (ref 34.8–46.1)
HDLC SERPL-MCNC: 48 MG/DL (ref 40–60)
HGB BLD-MCNC: 11.7 G/DL (ref 11.5–15.4)
HGB UR QL STRIP.AUTO: NEGATIVE
IMM GRANULOCYTES # BLD AUTO: 0.01 THOUSAND/UL (ref 0–0.2)
IMM GRANULOCYTES NFR BLD AUTO: 0 % (ref 0–2)
INR PPP: 0.99 (ref 0.86–1.17)
KETONES UR STRIP-MCNC: NEGATIVE MG/DL
LDLC SERPL CALC-MCNC: 66 MG/DL (ref 0–100)
LEUKOCYTE ESTERASE UR QL STRIP: NEGATIVE
LYMPHOCYTES # BLD AUTO: 2.07 THOUSANDS/ΜL (ref 0.6–4.47)
LYMPHOCYTES NFR BLD AUTO: 36 % (ref 14–44)
MCH RBC QN AUTO: 32.4 PG (ref 26.8–34.3)
MCHC RBC AUTO-ENTMCNC: 31.3 G/DL (ref 31.4–37.4)
MCV RBC AUTO: 104 FL (ref 82–98)
MONOCYTES # BLD AUTO: 0.31 THOUSAND/ΜL (ref 0.17–1.22)
MONOCYTES NFR BLD AUTO: 6 % (ref 4–12)
NEUTROPHILS # BLD AUTO: 3.03 THOUSANDS/ΜL (ref 1.85–7.62)
NEUTS SEG NFR BLD AUTO: 53 % (ref 43–75)
NITRITE UR QL STRIP: NEGATIVE
NONHDLC SERPL-MCNC: 145 MG/DL
NRBC BLD AUTO-RTO: 0 /100 WBCS
PH UR STRIP.AUTO: 5 [PH]
PLATELET # BLD AUTO: 188 THOUSANDS/UL (ref 149–390)
PMV BLD AUTO: 9.2 FL (ref 8.9–12.7)
POTASSIUM SERPL-SCNC: 4.7 MMOL/L (ref 3.5–5.3)
PROT SERPL-MCNC: 7.9 G/DL (ref 6.4–8.2)
PROT UR STRIP-MCNC: NEGATIVE MG/DL
PROTHROMBIN TIME: 13.2 SECONDS (ref 11.8–14.2)
RBC # BLD AUTO: 3.61 MILLION/UL (ref 3.81–5.12)
RH BLD: NEGATIVE
SODIUM SERPL-SCNC: 138 MMOL/L (ref 136–145)
SP GR UR STRIP.AUTO: 1.02 (ref 1–1.03)
SPECIMEN EXPIRATION DATE: NORMAL
TRIGL SERPL-MCNC: 397 MG/DL
TSH SERPL DL<=0.05 MIU/L-ACNC: 3.62 UIU/ML (ref 0.36–3.74)
UROBILINOGEN UR QL STRIP.AUTO: 0.2 E.U./DL
WBC # BLD AUTO: 5.68 THOUSAND/UL (ref 4.31–10.16)

## 2019-04-09 PROCEDURE — 80053 COMPREHEN METABOLIC PANEL: CPT

## 2019-04-09 PROCEDURE — 80061 LIPID PANEL: CPT

## 2019-04-09 PROCEDURE — 86850 RBC ANTIBODY SCREEN: CPT

## 2019-04-09 PROCEDURE — 86225 DNA ANTIBODY NATIVE: CPT

## 2019-04-09 PROCEDURE — 85652 RBC SED RATE AUTOMATED: CPT

## 2019-04-09 PROCEDURE — 83036 HEMOGLOBIN GLYCOSYLATED A1C: CPT

## 2019-04-09 PROCEDURE — 86038 ANTINUCLEAR ANTIBODIES: CPT

## 2019-04-09 PROCEDURE — 86900 BLOOD TYPING SEROLOGIC ABO: CPT

## 2019-04-09 PROCEDURE — 86140 C-REACTIVE PROTEIN: CPT

## 2019-04-09 PROCEDURE — 81003 URINALYSIS AUTO W/O SCOPE: CPT | Performed by: PHYSICIAN ASSISTANT

## 2019-04-09 PROCEDURE — 85610 PROTHROMBIN TIME: CPT

## 2019-04-09 PROCEDURE — 85730 THROMBOPLASTIN TIME PARTIAL: CPT

## 2019-04-09 PROCEDURE — 36415 COLL VENOUS BLD VENIPUNCTURE: CPT

## 2019-04-09 PROCEDURE — 86039 ANTINUCLEAR ANTIBODIES (ANA): CPT

## 2019-04-09 PROCEDURE — 86160 COMPLEMENT ANTIGEN: CPT

## 2019-04-09 PROCEDURE — 84443 ASSAY THYROID STIM HORMONE: CPT

## 2019-04-09 PROCEDURE — 82306 VITAMIN D 25 HYDROXY: CPT

## 2019-04-09 PROCEDURE — 85025 COMPLETE CBC W/AUTO DIFF WBC: CPT

## 2019-04-09 PROCEDURE — 87081 CULTURE SCREEN ONLY: CPT

## 2019-04-09 PROCEDURE — 86901 BLOOD TYPING SEROLOGIC RH(D): CPT

## 2019-04-10 LAB
ANA HOMOGEN SER QL IF: NORMAL
ANA HOMOGEN TITR SER: NORMAL {TITER}
DSDNA AB SER-ACNC: 20 IU/ML (ref 0–9)
MRSA NOSE QL CULT: NORMAL
RYE IGE QN: POSITIVE

## 2019-04-16 ENCOUNTER — ANESTHESIA EVENT (OUTPATIENT)
Dept: PERIOP | Facility: HOSPITAL | Age: 84
DRG: 472 | End: 2019-04-16
Payer: COMMERCIAL

## 2019-04-18 RX ORDER — HYDROCODONE BITARTRATE AND ACETAMINOPHEN 5; 325 MG/1; MG/1
1 TABLET ORAL EVERY 6 HOURS PRN
COMMUNITY
End: 2019-08-20 | Stop reason: HOSPADM

## 2019-04-30 ENCOUNTER — DOCUMENTATION (OUTPATIENT)
Dept: NEUROSURGERY | Facility: CLINIC | Age: 84
End: 2019-04-30

## 2019-05-01 ENCOUNTER — HOSPITAL ENCOUNTER (INPATIENT)
Facility: HOSPITAL | Age: 84
LOS: 2 days | Discharge: HOME | DRG: 472 | End: 2019-05-03
Attending: NEUROLOGICAL SURGERY | Admitting: NEUROLOGICAL SURGERY
Payer: COMMERCIAL

## 2019-05-01 ENCOUNTER — ANESTHESIA (OUTPATIENT)
Dept: PERIOP | Facility: HOSPITAL | Age: 84
DRG: 472 | End: 2019-05-01
Payer: COMMERCIAL

## 2019-05-01 ENCOUNTER — HOSPITAL ENCOUNTER (OUTPATIENT)
Dept: RADIOLOGY | Facility: HOSPITAL | Age: 84
Setting detail: SURGERY ADMIT
Discharge: HOME | DRG: 472 | End: 2019-05-01
Payer: COMMERCIAL

## 2019-05-01 DIAGNOSIS — S12.130K: ICD-10-CM

## 2019-05-01 DIAGNOSIS — Z98.890 POST-OPERATIVE STATE: Primary | ICD-10-CM

## 2019-05-01 LAB
ABO GROUP BLD: NORMAL
BLD GP AB SCN SERPL QL: NEGATIVE
CA-I BLD-SCNC: 0.61 MMOL/L (ref 1.12–1.32)
GLUCOSE SERPL-MCNC: 182 MG/DL (ref 65–140)
GLUCOSE SERPL-MCNC: 192 MG/DL (ref 65–140)
GLUCOSE SERPL-MCNC: 192 MG/DL (ref 65–140)
GLUCOSE SERPL-MCNC: 41 MG/DL (ref 65–140)
GLUCOSE SERPL-MCNC: 93 MG/DL (ref 65–140)
HCT VFR BLD CALC: <15 % (ref 34.8–46.1)
PCO2 BLD: <17 MM HG (ref 36–44)
PH BLD: 7.43 [PH] (ref 7.35–7.45)
PLATELET # BLD AUTO: 151 THOUSANDS/UL (ref 149–390)
PMV BLD AUTO: 9.1 FL (ref 8.9–12.7)
PO2 BLD: 244 MM HG (ref 75–129)
POTASSIUM BLD-SCNC: <2 MMOL/L (ref 3.5–5.3)
RH BLD: NEGATIVE
SODIUM BLD-SCNC: 154 MMOL/L (ref 136–145)
SPECIMEN EXPIRATION DATE: NORMAL
SPECIMEN SOURCE: ABNORMAL

## 2019-05-01 PROCEDURE — 22840 INSERT SPINE FIXATION DEVICE: CPT | Performed by: NEUROLOGICAL SURGERY

## 2019-05-01 PROCEDURE — 82803 BLOOD GASES ANY COMBINATION: CPT

## 2019-05-01 PROCEDURE — 86850 RBC ANTIBODY SCREEN: CPT | Performed by: PHYSICIAN ASSISTANT

## 2019-05-01 PROCEDURE — 86900 BLOOD TYPING SEROLOGIC ABO: CPT | Performed by: PHYSICIAN ASSISTANT

## 2019-05-01 PROCEDURE — 82947 ASSAY GLUCOSE BLOOD QUANT: CPT

## 2019-05-01 PROCEDURE — 84132 ASSAY OF SERUM POTASSIUM: CPT

## 2019-05-01 PROCEDURE — 0RG10J1 FUSION OF CERVICAL VERTEBRAL JOINT WITH SYNTHETIC SUBSTITUTE, POSTERIOR APPROACH, POSTERIOR COLUMN, OPEN APPROACH: ICD-10-PCS | Performed by: NEUROLOGICAL SURGERY

## 2019-05-01 PROCEDURE — 82330 ASSAY OF CALCIUM: CPT

## 2019-05-01 PROCEDURE — 85049 AUTOMATED PLATELET COUNT: CPT | Performed by: NEUROLOGICAL SURGERY

## 2019-05-01 PROCEDURE — C1713 ANCHOR/SCREW BN/BN,TIS/BN: HCPCS | Performed by: NEUROLOGICAL SURGERY

## 2019-05-01 PROCEDURE — 72040 X-RAY EXAM NECK SPINE 2-3 VW: CPT

## 2019-05-01 PROCEDURE — 84295 ASSAY OF SERUM SODIUM: CPT

## 2019-05-01 PROCEDURE — 22595 ARTHRD PST TQ ATLAS-AXIS: CPT | Performed by: NEUROLOGICAL SURGERY

## 2019-05-01 PROCEDURE — 85014 HEMATOCRIT: CPT

## 2019-05-01 PROCEDURE — 82947 ASSAY GLUCOSE BLOOD QUANT: CPT | Performed by: NURSE PRACTITIONER

## 2019-05-01 PROCEDURE — 82948 REAGENT STRIP/BLOOD GLUCOSE: CPT

## 2019-05-01 PROCEDURE — 86901 BLOOD TYPING SEROLOGIC RH(D): CPT | Performed by: PHYSICIAN ASSISTANT

## 2019-05-01 PROCEDURE — 20930 SP BONE ALGRFT MORSEL ADD-ON: CPT | Performed by: NEUROLOGICAL SURGERY

## 2019-05-01 DEVICE — ROD 3603725 PRE-CUT 3.5MM X 25MM
Type: IMPLANTABLE DEVICE | Site: SPINE CERVICAL | Status: FUNCTIONAL
Brand: INFINITY™ OCCIPITOCERVICAL UPPER THORACIC SYSTEM

## 2019-05-01 DEVICE — DBM 006005 PROGENIX PLUS 5CC
Type: IMPLANTABLE DEVICE | Site: SPINE CERVICAL | Status: FUNCTIONAL
Brand: PROGENIX® PUTTY AND PROGENIX® PLUS

## 2019-05-01 RX ORDER — MAGNESIUM SULFATE HEPTAHYDRATE 40 MG/ML
INJECTION, SOLUTION INTRAVENOUS AS NEEDED
Status: DISCONTINUED | OUTPATIENT
Start: 2019-05-01 | End: 2019-05-01

## 2019-05-01 RX ORDER — LIDOCAINE 50 MG/G
2 PATCH TOPICAL DAILY
Status: DISCONTINUED | OUTPATIENT
Start: 2019-05-01 | End: 2019-05-03 | Stop reason: HOSPADM

## 2019-05-01 RX ORDER — CEFAZOLIN SODIUM 1 G/50ML
1000 SOLUTION INTRAVENOUS EVERY 8 HOURS
Status: COMPLETED | OUTPATIENT
Start: 2019-05-01 | End: 2019-05-02

## 2019-05-01 RX ORDER — NYSTATIN 100000 [USP'U]/G
POWDER TOPICAL 2 TIMES DAILY
Status: DISCONTINUED | OUTPATIENT
Start: 2019-05-01 | End: 2019-05-03 | Stop reason: HOSPADM

## 2019-05-01 RX ORDER — FENTANYL CITRATE/PF 50 MCG/ML
25 SYRINGE (ML) INJECTION
Status: COMPLETED | OUTPATIENT
Start: 2019-05-01 | End: 2019-05-01

## 2019-05-01 RX ORDER — LABETALOL 20 MG/4 ML (5 MG/ML) INTRAVENOUS SYRINGE
AS NEEDED
Status: DISCONTINUED | OUTPATIENT
Start: 2019-05-01 | End: 2019-05-01 | Stop reason: SURG

## 2019-05-01 RX ORDER — CHLORHEXIDINE GLUCONATE 0.12 MG/ML
15 RINSE ORAL ONCE
Status: COMPLETED | OUTPATIENT
Start: 2019-05-01 | End: 2019-05-01

## 2019-05-01 RX ORDER — MIDAZOLAM HYDROCHLORIDE 1 MG/ML
INJECTION INTRAMUSCULAR; INTRAVENOUS AS NEEDED
Status: DISCONTINUED | OUTPATIENT
Start: 2019-05-01 | End: 2019-05-01 | Stop reason: SURG

## 2019-05-01 RX ORDER — SERTRALINE HYDROCHLORIDE 25 MG/1
25 TABLET, FILM COATED ORAL
Status: DISCONTINUED | OUTPATIENT
Start: 2019-05-01 | End: 2019-05-03 | Stop reason: HOSPADM

## 2019-05-01 RX ORDER — PROPOFOL 10 MG/ML
INJECTION, EMULSION INTRAVENOUS AS NEEDED
Status: DISCONTINUED | OUTPATIENT
Start: 2019-05-01 | End: 2019-05-01 | Stop reason: SURG

## 2019-05-01 RX ORDER — AMPICILLIN TRIHYDRATE 250 MG
1 CAPSULE ORAL DAILY
Status: DISCONTINUED | OUTPATIENT
Start: 2019-05-01 | End: 2019-05-01 | Stop reason: ALTCHOICE

## 2019-05-01 RX ORDER — SODIUM CHLORIDE 9 MG/ML
INJECTION, SOLUTION INTRAVENOUS CONTINUOUS PRN
Status: DISCONTINUED | OUTPATIENT
Start: 2019-05-01 | End: 2019-05-01 | Stop reason: SURG

## 2019-05-01 RX ORDER — SODIUM CHLORIDE, SODIUM LACTATE, POTASSIUM CHLORIDE, CALCIUM CHLORIDE 600; 310; 30; 20 MG/100ML; MG/100ML; MG/100ML; MG/100ML
50 INJECTION, SOLUTION INTRAVENOUS CONTINUOUS
Status: DISCONTINUED | OUTPATIENT
Start: 2019-05-01 | End: 2019-05-02

## 2019-05-01 RX ORDER — LEVOTHYROXINE SODIUM 112 UG/1
112 TABLET ORAL
Status: DISCONTINUED | OUTPATIENT
Start: 2019-05-02 | End: 2019-05-03 | Stop reason: HOSPADM

## 2019-05-01 RX ORDER — OXYCODONE HYDROCHLORIDE 5 MG/1
5 TABLET ORAL EVERY 4 HOURS PRN
Status: DISCONTINUED | OUTPATIENT
Start: 2019-05-01 | End: 2019-05-03 | Stop reason: HOSPADM

## 2019-05-01 RX ORDER — METOPROLOL TARTRATE 5 MG/5ML
INJECTION INTRAVENOUS AS NEEDED
Status: DISCONTINUED | OUTPATIENT
Start: 2019-05-01 | End: 2019-05-01 | Stop reason: SURG

## 2019-05-01 RX ORDER — SODIUM CHLORIDE 9 MG/ML
125 INJECTION, SOLUTION INTRAVENOUS CONTINUOUS
Status: DISCONTINUED | OUTPATIENT
Start: 2019-05-01 | End: 2019-05-01

## 2019-05-01 RX ORDER — LIDOCAINE HYDROCHLORIDE 10 MG/ML
0.5 INJECTION, SOLUTION EPIDURAL; INFILTRATION; INTRACAUDAL; PERINEURAL ONCE AS NEEDED
Status: COMPLETED | OUTPATIENT
Start: 2019-05-01 | End: 2019-05-01

## 2019-05-01 RX ORDER — LABETALOL 20 MG/4 ML (5 MG/ML) INTRAVENOUS SYRINGE
5
Status: DISCONTINUED | OUTPATIENT
Start: 2019-05-01 | End: 2019-05-01 | Stop reason: HOSPADM

## 2019-05-01 RX ORDER — BISACODYL 10 MG
10 SUPPOSITORY, RECTAL RECTAL DAILY PRN
Status: DISCONTINUED | OUTPATIENT
Start: 2019-05-01 | End: 2019-05-03 | Stop reason: HOSPADM

## 2019-05-01 RX ORDER — GLYCOPYRROLATE 0.2 MG/ML
INJECTION INTRAMUSCULAR; INTRAVENOUS AS NEEDED
Status: DISCONTINUED | OUTPATIENT
Start: 2019-05-01 | End: 2019-05-01 | Stop reason: SURG

## 2019-05-01 RX ORDER — LOSARTAN POTASSIUM 50 MG/1
50 TABLET ORAL DAILY
Status: DISCONTINUED | OUTPATIENT
Start: 2019-05-01 | End: 2019-05-03 | Stop reason: HOSPADM

## 2019-05-01 RX ORDER — KETAMINE HYDROCHLORIDE 50 MG/ML
INJECTION, SOLUTION, CONCENTRATE INTRAMUSCULAR; INTRAVENOUS AS NEEDED
Status: DISCONTINUED | OUTPATIENT
Start: 2019-05-01 | End: 2019-05-01 | Stop reason: SURG

## 2019-05-01 RX ORDER — EPHEDRINE SULFATE 50 MG/ML
INJECTION INTRAVENOUS AS NEEDED
Status: DISCONTINUED | OUTPATIENT
Start: 2019-05-01 | End: 2019-05-01 | Stop reason: SURG

## 2019-05-01 RX ORDER — ALBUTEROL SULFATE 90 UG/1
2 AEROSOL, METERED RESPIRATORY (INHALATION) EVERY 4 HOURS PRN
Status: DISCONTINUED | OUTPATIENT
Start: 2019-05-01 | End: 2019-05-03 | Stop reason: HOSPADM

## 2019-05-01 RX ORDER — CEFAZOLIN SODIUM 2 G/50ML
2000 SOLUTION INTRAVENOUS ONCE
Status: COMPLETED | OUTPATIENT
Start: 2019-05-01 | End: 2019-05-01

## 2019-05-01 RX ORDER — DEXAMETHASONE SODIUM PHOSPHATE 10 MG/ML
INJECTION, SOLUTION INTRAMUSCULAR; INTRAVENOUS AS NEEDED
Status: DISCONTINUED | OUTPATIENT
Start: 2019-05-01 | End: 2019-05-01 | Stop reason: SURG

## 2019-05-01 RX ORDER — AMLODIPINE BESYLATE 5 MG/1
5 TABLET ORAL EVERY MORNING
Status: DISCONTINUED | OUTPATIENT
Start: 2019-05-02 | End: 2019-05-03 | Stop reason: HOSPADM

## 2019-05-01 RX ORDER — B-COMPLEX WITH VITAMIN C
1 TABLET ORAL 2 TIMES DAILY WITH MEALS
Status: DISCONTINUED | OUTPATIENT
Start: 2019-05-01 | End: 2019-05-03 | Stop reason: HOSPADM

## 2019-05-01 RX ORDER — SENNOSIDES 8.6 MG
1 TABLET ORAL DAILY
Status: DISCONTINUED | OUTPATIENT
Start: 2019-05-01 | End: 2019-05-02

## 2019-05-01 RX ORDER — HYDRALAZINE HYDROCHLORIDE 20 MG/ML
INJECTION INTRAMUSCULAR; INTRAVENOUS AS NEEDED
Status: DISCONTINUED | OUTPATIENT
Start: 2019-05-01 | End: 2019-05-01 | Stop reason: SURG

## 2019-05-01 RX ORDER — LIDOCAINE HYDROCHLORIDE 10 MG/ML
INJECTION, SOLUTION INFILTRATION; PERINEURAL AS NEEDED
Status: DISCONTINUED | OUTPATIENT
Start: 2019-05-01 | End: 2019-05-01 | Stop reason: HOSPADM

## 2019-05-01 RX ORDER — ONDANSETRON 2 MG/ML
4 INJECTION INTRAMUSCULAR; INTRAVENOUS EVERY 6 HOURS PRN
Status: DISCONTINUED | OUTPATIENT
Start: 2019-05-01 | End: 2019-05-03 | Stop reason: HOSPADM

## 2019-05-01 RX ORDER — CHLORAL HYDRATE 500 MG
1000 CAPSULE ORAL DAILY
Status: DISCONTINUED | OUTPATIENT
Start: 2019-05-01 | End: 2019-05-03 | Stop reason: HOSPADM

## 2019-05-01 RX ORDER — HYDROMORPHONE HCL/PF 1 MG/ML
0.2 SYRINGE (ML) INJECTION
Status: DISCONTINUED | OUTPATIENT
Start: 2019-05-01 | End: 2019-05-01 | Stop reason: HOSPADM

## 2019-05-01 RX ORDER — BUPIVACAINE HYDROCHLORIDE AND EPINEPHRINE 5; 5 MG/ML; UG/ML
INJECTION, SOLUTION EPIDURAL; INTRACAUDAL; PERINEURAL AS NEEDED
Status: DISCONTINUED | OUTPATIENT
Start: 2019-05-01 | End: 2019-05-01 | Stop reason: HOSPADM

## 2019-05-01 RX ORDER — ONDANSETRON 2 MG/ML
4 INJECTION INTRAMUSCULAR; INTRAVENOUS EVERY 4 HOURS PRN
Status: DISCONTINUED | OUTPATIENT
Start: 2019-05-01 | End: 2019-05-01

## 2019-05-01 RX ORDER — SUCCINYLCHOLINE/SOD CL,ISO/PF 100 MG/5ML
SYRINGE (ML) INTRAVENOUS AS NEEDED
Status: DISCONTINUED | OUTPATIENT
Start: 2019-05-01 | End: 2019-05-01 | Stop reason: SURG

## 2019-05-01 RX ORDER — METHOCARBAMOL 500 MG/1
500 TABLET, FILM COATED ORAL 3 TIMES DAILY PRN
Status: DISCONTINUED | OUTPATIENT
Start: 2019-05-01 | End: 2019-05-03 | Stop reason: HOSPADM

## 2019-05-01 RX ORDER — NEOSTIGMINE METHYLSULFATE 1 MG/ML
INJECTION INTRAVENOUS AS NEEDED
Status: DISCONTINUED | OUTPATIENT
Start: 2019-05-01 | End: 2019-05-01 | Stop reason: SURG

## 2019-05-01 RX ORDER — ONDANSETRON 2 MG/ML
INJECTION INTRAMUSCULAR; INTRAVENOUS AS NEEDED
Status: DISCONTINUED | OUTPATIENT
Start: 2019-05-01 | End: 2019-05-01 | Stop reason: SURG

## 2019-05-01 RX ORDER — POLYETHYLENE GLYCOL 3350 17 G/17G
17 POWDER, FOR SOLUTION ORAL DAILY
Status: DISCONTINUED | OUTPATIENT
Start: 2019-05-02 | End: 2019-05-03 | Stop reason: HOSPADM

## 2019-05-01 RX ORDER — SENNOSIDES 8.6 MG
1 TABLET ORAL DAILY
Status: DISCONTINUED | OUTPATIENT
Start: 2019-05-02 | End: 2019-05-03 | Stop reason: HOSPADM

## 2019-05-01 RX ORDER — FENTANYL CITRATE 50 UG/ML
INJECTION, SOLUTION INTRAMUSCULAR; INTRAVENOUS AS NEEDED
Status: DISCONTINUED | OUTPATIENT
Start: 2019-05-01 | End: 2019-05-01 | Stop reason: SURG

## 2019-05-01 RX ORDER — OXYCODONE HYDROCHLORIDE 5 MG/1
2.5 TABLET ORAL EVERY 4 HOURS PRN
Status: DISCONTINUED | OUTPATIENT
Start: 2019-05-01 | End: 2019-05-03 | Stop reason: HOSPADM

## 2019-05-01 RX ORDER — ACETAMINOPHEN 325 MG/1
975 TABLET ORAL EVERY 8 HOURS SCHEDULED
Status: DISCONTINUED | OUTPATIENT
Start: 2019-05-01 | End: 2019-05-03 | Stop reason: HOSPADM

## 2019-05-01 RX ORDER — HYDROMORPHONE HCL/PF 1 MG/ML
0.2 SYRINGE (ML) INJECTION EVERY 4 HOURS PRN
Status: DISCONTINUED | OUTPATIENT
Start: 2019-05-01 | End: 2019-05-03 | Stop reason: HOSPADM

## 2019-05-01 RX ORDER — HYDROXYCHLOROQUINE SULFATE 200 MG/1
200 TABLET, FILM COATED ORAL DAILY
Status: DISCONTINUED | OUTPATIENT
Start: 2019-05-01 | End: 2019-05-03 | Stop reason: HOSPADM

## 2019-05-01 RX ORDER — VANCOMYCIN HYDROCHLORIDE 1 G/20ML
INJECTION, POWDER, LYOPHILIZED, FOR SOLUTION INTRAVENOUS AS NEEDED
Status: DISCONTINUED | OUTPATIENT
Start: 2019-05-01 | End: 2019-05-01 | Stop reason: HOSPADM

## 2019-05-01 RX ORDER — ONDANSETRON 2 MG/ML
4 INJECTION INTRAMUSCULAR; INTRAVENOUS ONCE AS NEEDED
Status: DISCONTINUED | OUTPATIENT
Start: 2019-05-01 | End: 2019-05-01 | Stop reason: HOSPADM

## 2019-05-01 RX ORDER — SODIUM CHLORIDE 9 MG/ML
75 INJECTION, SOLUTION INTRAVENOUS CONTINUOUS
Status: DISCONTINUED | OUTPATIENT
Start: 2019-05-01 | End: 2019-05-02

## 2019-05-01 RX ORDER — HEPARIN SODIUM 5000 [USP'U]/ML
5000 INJECTION, SOLUTION INTRAVENOUS; SUBCUTANEOUS EVERY 8 HOURS SCHEDULED
Status: DISCONTINUED | OUTPATIENT
Start: 2019-05-01 | End: 2019-05-03 | Stop reason: HOSPADM

## 2019-05-01 RX ORDER — ALBUMIN, HUMAN INJ 5% 5 %
SOLUTION INTRAVENOUS CONTINUOUS PRN
Status: DISCONTINUED | OUTPATIENT
Start: 2019-05-01 | End: 2019-05-01 | Stop reason: SURG

## 2019-05-01 RX ORDER — LIDOCAINE HYDROCHLORIDE AND EPINEPHRINE 10; 10 MG/ML; UG/ML
INJECTION, SOLUTION INFILTRATION; PERINEURAL AS NEEDED
Status: DISCONTINUED | OUTPATIENT
Start: 2019-05-01 | End: 2019-05-01 | Stop reason: HOSPADM

## 2019-05-01 RX ORDER — GABAPENTIN 300 MG/1
600 CAPSULE ORAL 2 TIMES DAILY
Status: DISCONTINUED | OUTPATIENT
Start: 2019-05-01 | End: 2019-05-02

## 2019-05-01 RX ORDER — FUROSEMIDE 20 MG/1
20 TABLET ORAL DAILY
Status: DISCONTINUED | OUTPATIENT
Start: 2019-05-01 | End: 2019-05-03 | Stop reason: HOSPADM

## 2019-05-01 RX ORDER — LIDOCAINE HYDROCHLORIDE 10 MG/ML
INJECTION, SOLUTION INFILTRATION; PERINEURAL
Status: DISCONTINUED | OUTPATIENT
Start: 2019-05-01 | End: 2019-05-01

## 2019-05-01 RX ORDER — ROCURONIUM BROMIDE 10 MG/ML
INJECTION, SOLUTION INTRAVENOUS AS NEEDED
Status: DISCONTINUED | OUTPATIENT
Start: 2019-05-01 | End: 2019-05-01 | Stop reason: SURG

## 2019-05-01 RX ORDER — MAGNESIUM SULFATE HEPTAHYDRATE 40 MG/ML
INJECTION, SOLUTION INTRAVENOUS AS NEEDED
Status: DISCONTINUED | OUTPATIENT
Start: 2019-05-01 | End: 2019-05-01 | Stop reason: SURG

## 2019-05-01 RX ORDER — NITROGLYCERIN 0.4 MG/1
0.4 TABLET SUBLINGUAL
Status: DISCONTINUED | OUTPATIENT
Start: 2019-05-01 | End: 2019-05-03 | Stop reason: HOSPADM

## 2019-05-01 RX ORDER — LIDOCAINE HYDROCHLORIDE 10 MG/ML
INJECTION, SOLUTION INFILTRATION; PERINEURAL AS NEEDED
Status: DISCONTINUED | OUTPATIENT
Start: 2019-05-01 | End: 2019-05-01 | Stop reason: SURG

## 2019-05-01 RX ADMIN — OXYCODONE HYDROCHLORIDE 5 MG: 5 TABLET ORAL at 18:31

## 2019-05-01 RX ADMIN — NEOSTIGMINE METHYLSULFATE 1 MG: 1 INJECTION, SOLUTION INTRAVENOUS at 13:05

## 2019-05-01 RX ADMIN — ROCURONIUM BROMIDE 30 MG: 10 INJECTION, SOLUTION INTRAVENOUS at 10:26

## 2019-05-01 RX ADMIN — SODIUM CHLORIDE 75 ML/HR: 0.9 INJECTION, SOLUTION INTRAVENOUS at 15:15

## 2019-05-01 RX ADMIN — SODIUM CHLORIDE, SODIUM LACTATE, POTASSIUM CHLORIDE, AND CALCIUM CHLORIDE 50 ML/HR: .6; .31; .03; .02 INJECTION, SOLUTION INTRAVENOUS at 08:57

## 2019-05-01 RX ADMIN — EPHEDRINE SULFATE 15 MG: 50 INJECTION, SOLUTION INTRAVENOUS at 12:41

## 2019-05-01 RX ADMIN — DEXAMETHASONE SODIUM PHOSPHATE 10 MG: 10 INJECTION, SOLUTION INTRAMUSCULAR; INTRAVENOUS at 11:04

## 2019-05-01 RX ADMIN — PHENYLEPHRINE HYDROCHLORIDE 40 MCG/MIN: 10 INJECTION INTRAVENOUS at 11:20

## 2019-05-01 RX ADMIN — CHLORHEXIDINE GLUCONATE 0.12% ORAL RINSE 15 ML: 1.2 LIQUID ORAL at 09:06

## 2019-05-01 RX ADMIN — LIDOCAINE HYDROCHLORIDE 0.5 ML: 10 INJECTION, SOLUTION EPIDURAL; INFILTRATION; INTRACAUDAL; PERINEURAL at 08:57

## 2019-05-01 RX ADMIN — FENTANYL CITRATE 50 MCG: 50 INJECTION, SOLUTION INTRAMUSCULAR; INTRAVENOUS at 10:11

## 2019-05-01 RX ADMIN — NEOSTIGMINE METHYLSULFATE 3 MG: 1 INJECTION, SOLUTION INTRAVENOUS at 12:47

## 2019-05-01 RX ADMIN — FENTANYL CITRATE 50 MCG: 50 INJECTION, SOLUTION INTRAMUSCULAR; INTRAVENOUS at 10:46

## 2019-05-01 RX ADMIN — SODIUM CHLORIDE: 0.9 INJECTION, SOLUTION INTRAVENOUS at 10:20

## 2019-05-01 RX ADMIN — FENTANYL CITRATE 25 MCG: 50 INJECTION, SOLUTION INTRAMUSCULAR; INTRAVENOUS at 14:31

## 2019-05-01 RX ADMIN — GABAPENTIN 600 MG: 300 CAPSULE ORAL at 17:28

## 2019-05-01 RX ADMIN — LABETALOL HYDROCHLORIDE 5 MG: 5 INJECTION, SOLUTION INTRAVENOUS at 13:15

## 2019-05-01 RX ADMIN — HYDRALAZINE HYDROCHLORIDE 5 MG: 20 INJECTION INTRAMUSCULAR; INTRAVENOUS at 14:02

## 2019-05-01 RX ADMIN — METOPROLOL TARTRATE 25 MG: 25 TABLET, FILM COATED ORAL at 17:28

## 2019-05-01 RX ADMIN — FENTANYL CITRATE 50 MCG: 50 INJECTION, SOLUTION INTRAMUSCULAR; INTRAVENOUS at 13:50

## 2019-05-01 RX ADMIN — KETAMINE HYDROCHLORIDE 10 MG: 50 INJECTION, SOLUTION INTRAMUSCULAR; INTRAVENOUS at 12:00

## 2019-05-01 RX ADMIN — MIDAZOLAM 1 MG: 1 INJECTION INTRAMUSCULAR; INTRAVENOUS at 10:08

## 2019-05-01 RX ADMIN — LABETALOL HYDROCHLORIDE 5 MG: 5 INJECTION, SOLUTION INTRAVENOUS at 13:20

## 2019-05-01 RX ADMIN — CEFAZOLIN SODIUM 2000 MG: 2 SOLUTION INTRAVENOUS at 11:00

## 2019-05-01 RX ADMIN — FUROSEMIDE 20 MG: 20 TABLET ORAL at 17:28

## 2019-05-01 RX ADMIN — SUGAMMADEX 178 MG: 100 INJECTION, SOLUTION INTRAVENOUS at 13:10

## 2019-05-01 RX ADMIN — HYDROMORPHONE HYDROCHLORIDE 0.2 MG: 1 INJECTION, SOLUTION INTRAMUSCULAR; INTRAVENOUS; SUBCUTANEOUS at 20:35

## 2019-05-01 RX ADMIN — SERTRALINE HYDROCHLORIDE 25 MG: 25 TABLET ORAL at 21:20

## 2019-05-01 RX ADMIN — GLYCOPYRROLATE 0.6 MG: 0.2 INJECTION, SOLUTION INTRAMUSCULAR; INTRAVENOUS at 12:46

## 2019-05-01 RX ADMIN — EPHEDRINE SULFATE 10 MG: 50 INJECTION, SOLUTION INTRAVENOUS at 10:35

## 2019-05-01 RX ADMIN — GLYCOPYRROLATE 0.2 MG: 0.2 INJECTION, SOLUTION INTRAMUSCULAR; INTRAVENOUS at 13:05

## 2019-05-01 RX ADMIN — FENTANYL CITRATE 50 MCG: 50 INJECTION, SOLUTION INTRAMUSCULAR; INTRAVENOUS at 13:31

## 2019-05-01 RX ADMIN — LABETALOL HYDROCHLORIDE 5 MG: 5 INJECTION, SOLUTION INTRAVENOUS at 13:45

## 2019-05-01 RX ADMIN — METOPROLOL TARTRATE 2 MG: 1 INJECTION, SOLUTION INTRAVENOUS at 12:07

## 2019-05-01 RX ADMIN — Medication 100 MG: at 10:12

## 2019-05-01 RX ADMIN — PROPOFOL 150 MG: 10 INJECTION, EMULSION INTRAVENOUS at 10:12

## 2019-05-01 RX ADMIN — LABETALOL HYDROCHLORIDE 5 MG: 5 INJECTION, SOLUTION INTRAVENOUS at 13:35

## 2019-05-01 RX ADMIN — HYDROMORPHONE HYDROCHLORIDE 0.2 MG: 1 INJECTION, SOLUTION INTRAMUSCULAR; INTRAVENOUS; SUBCUTANEOUS at 14:49

## 2019-05-01 RX ADMIN — FENTANYL CITRATE 50 MCG: 50 INJECTION, SOLUTION INTRAMUSCULAR; INTRAVENOUS at 11:44

## 2019-05-01 RX ADMIN — ALBUMIN (HUMAN): 12.5 SOLUTION INTRAVENOUS at 11:08

## 2019-05-01 RX ADMIN — LIDOCAINE HYDROCHLORIDE 50 MG: 10 INJECTION, SOLUTION INFILTRATION; PERINEURAL at 10:11

## 2019-05-01 RX ADMIN — MAGNESIUM SULFATE IN WATER 1 G: 40 INJECTION, SOLUTION INTRAVENOUS at 12:01

## 2019-05-01 RX ADMIN — FENTANYL CITRATE 50 MCG: 50 INJECTION, SOLUTION INTRAMUSCULAR; INTRAVENOUS at 13:40

## 2019-05-01 RX ADMIN — FENTANYL CITRATE 25 MCG: 50 INJECTION, SOLUTION INTRAMUSCULAR; INTRAVENOUS at 14:11

## 2019-05-01 RX ADMIN — PHENYLEPHRINE HYDROCHLORIDE 50 MCG: 10 INJECTION INTRAVENOUS at 10:11

## 2019-05-01 RX ADMIN — ROCURONIUM BROMIDE 20 MG: 10 INJECTION, SOLUTION INTRAVENOUS at 10:45

## 2019-05-01 RX ADMIN — LOSARTAN POTASSIUM 50 MG: 50 TABLET, FILM COATED ORAL at 17:28

## 2019-05-01 RX ADMIN — ACETAMINOPHEN 975 MG: 325 TABLET, FILM COATED ORAL at 21:20

## 2019-05-01 RX ADMIN — METOPROLOL TARTRATE 2 MG: 1 INJECTION, SOLUTION INTRAVENOUS at 11:48

## 2019-05-01 RX ADMIN — CEFAZOLIN SODIUM 1000 MG: 1 SOLUTION INTRAVENOUS at 20:01

## 2019-05-01 RX ADMIN — SODIUM CHLORIDE: 9 INJECTION, SOLUTION INTRAVENOUS at 10:46

## 2019-05-01 RX ADMIN — REMIFENTANIL HYDROCHLORIDE 0.2 MCG/KG/MIN: 1 INJECTION, POWDER, LYOPHILIZED, FOR SOLUTION INTRAVENOUS at 11:35

## 2019-05-01 RX ADMIN — METHOCARBAMOL 500 MG: 500 TABLET, FILM COATED ORAL at 18:31

## 2019-05-01 RX ADMIN — EPHEDRINE SULFATE 10 MG: 50 INJECTION, SOLUTION INTRAVENOUS at 10:17

## 2019-05-01 RX ADMIN — PROPOFOL 50 MG: 10 INJECTION, EMULSION INTRAVENOUS at 10:45

## 2019-05-01 RX ADMIN — ONDANSETRON 4 MG: 2 INJECTION INTRAMUSCULAR; INTRAVENOUS at 12:46

## 2019-05-01 RX ADMIN — HYDROMORPHONE HYDROCHLORIDE 0.2 MG: 1 INJECTION, SOLUTION INTRAMUSCULAR; INTRAVENOUS; SUBCUTANEOUS at 14:55

## 2019-05-02 ENCOUNTER — APPOINTMENT (INPATIENT)
Dept: RADIOLOGY | Facility: HOSPITAL | Age: 84
DRG: 472 | End: 2019-05-02
Payer: COMMERCIAL

## 2019-05-02 LAB
ANION GAP SERPL CALCULATED.3IONS-SCNC: 8 MMOL/L (ref 4–13)
BUN SERPL-MCNC: 19 MG/DL (ref 5–25)
CALCIUM SERPL-MCNC: 8.6 MG/DL (ref 8.3–10.1)
CHLORIDE SERPL-SCNC: 106 MMOL/L (ref 100–108)
CO2 SERPL-SCNC: 25 MMOL/L (ref 21–32)
CREAT SERPL-MCNC: 1.08 MG/DL (ref 0.6–1.3)
ERYTHROCYTE [DISTWIDTH] IN BLOOD BY AUTOMATED COUNT: 13 % (ref 11.6–15.1)
GFR SERPL CREATININE-BSD FRML MDRD: 47 ML/MIN/1.73SQ M
GLUCOSE SERPL-MCNC: 112 MG/DL (ref 65–140)
GLUCOSE SERPL-MCNC: 117 MG/DL (ref 65–140)
GLUCOSE SERPL-MCNC: 125 MG/DL (ref 65–140)
GLUCOSE SERPL-MCNC: 131 MG/DL (ref 65–140)
GLUCOSE SERPL-MCNC: 133 MG/DL (ref 65–140)
HCT VFR BLD AUTO: 32.5 % (ref 34.8–46.1)
HGB BLD-MCNC: 10.3 G/DL (ref 11.5–15.4)
MCH RBC QN AUTO: 32.1 PG (ref 26.8–34.3)
MCHC RBC AUTO-ENTMCNC: 31.7 G/DL (ref 31.4–37.4)
MCV RBC AUTO: 101 FL (ref 82–98)
PLATELET # BLD AUTO: 187 THOUSANDS/UL (ref 149–390)
PMV BLD AUTO: 9.3 FL (ref 8.9–12.7)
POTASSIUM SERPL-SCNC: 4.3 MMOL/L (ref 3.5–5.3)
RBC # BLD AUTO: 3.21 MILLION/UL (ref 3.81–5.12)
SODIUM SERPL-SCNC: 139 MMOL/L (ref 136–145)
WBC # BLD AUTO: 8.73 THOUSAND/UL (ref 4.31–10.16)

## 2019-05-02 PROCEDURE — G8978 MOBILITY CURRENT STATUS: HCPCS

## 2019-05-02 PROCEDURE — G8979 MOBILITY GOAL STATUS: HCPCS

## 2019-05-02 PROCEDURE — 97166 OT EVAL MOD COMPLEX 45 MIN: CPT

## 2019-05-02 PROCEDURE — G8988 SELF CARE GOAL STATUS: HCPCS

## 2019-05-02 PROCEDURE — 85027 COMPLETE CBC AUTOMATED: CPT | Performed by: PHYSICIAN ASSISTANT

## 2019-05-02 PROCEDURE — 80048 BASIC METABOLIC PNL TOTAL CA: CPT | Performed by: PHYSICIAN ASSISTANT

## 2019-05-02 PROCEDURE — 99024 POSTOP FOLLOW-UP VISIT: CPT | Performed by: NEUROLOGICAL SURGERY

## 2019-05-02 PROCEDURE — 82948 REAGENT STRIP/BLOOD GLUCOSE: CPT

## 2019-05-02 PROCEDURE — G8987 SELF CARE CURRENT STATUS: HCPCS

## 2019-05-02 PROCEDURE — 72040 X-RAY EXAM NECK SPINE 2-3 VW: CPT

## 2019-05-02 PROCEDURE — 97163 PT EVAL HIGH COMPLEX 45 MIN: CPT

## 2019-05-02 PROCEDURE — G8989 SELF CARE D/C STATUS: HCPCS

## 2019-05-02 RX ORDER — GABAPENTIN 400 MG/1
400 CAPSULE ORAL 3 TIMES DAILY
Status: DISCONTINUED | OUTPATIENT
Start: 2019-05-02 | End: 2019-05-03 | Stop reason: HOSPADM

## 2019-05-02 RX ADMIN — HYDROMORPHONE HYDROCHLORIDE 0.2 MG: 1 INJECTION, SOLUTION INTRAMUSCULAR; INTRAVENOUS; SUBCUTANEOUS at 22:49

## 2019-05-02 RX ADMIN — HEPARIN SODIUM 5000 UNITS: 5000 INJECTION INTRAVENOUS; SUBCUTANEOUS at 05:04

## 2019-05-02 RX ADMIN — METOPROLOL TARTRATE 25 MG: 25 TABLET, FILM COATED ORAL at 17:36

## 2019-05-02 RX ADMIN — HYDROXYCHLOROQUINE SULFATE 200 MG: 200 TABLET, FILM COATED ORAL at 08:36

## 2019-05-02 RX ADMIN — ACETAMINOPHEN 975 MG: 325 TABLET, FILM COATED ORAL at 22:20

## 2019-05-02 RX ADMIN — OXYCODONE HYDROCHLORIDE 5 MG: 5 TABLET ORAL at 08:28

## 2019-05-02 RX ADMIN — OXYCODONE HYDROCHLORIDE 5 MG: 5 TABLET ORAL at 04:19

## 2019-05-02 RX ADMIN — GABAPENTIN 400 MG: 400 CAPSULE ORAL at 22:20

## 2019-05-02 RX ADMIN — OXYCODONE HYDROCHLORIDE 5 MG: 5 TABLET ORAL at 22:20

## 2019-05-02 RX ADMIN — AMLODIPINE BESYLATE 5 MG: 5 TABLET ORAL at 08:35

## 2019-05-02 RX ADMIN — Medication 1000 MG: at 22:20

## 2019-05-02 RX ADMIN — METHOCARBAMOL 500 MG: 500 TABLET, FILM COATED ORAL at 02:30

## 2019-05-02 RX ADMIN — GABAPENTIN 400 MG: 400 CAPSULE ORAL at 17:36

## 2019-05-02 RX ADMIN — LIDOCAINE 2 PATCH: 50 PATCH CUTANEOUS at 08:31

## 2019-05-02 RX ADMIN — HEPARIN SODIUM 5000 UNITS: 5000 INJECTION INTRAVENOUS; SUBCUTANEOUS at 13:27

## 2019-05-02 RX ADMIN — NYSTATIN: 100000 POWDER TOPICAL at 08:37

## 2019-05-02 RX ADMIN — LEVOTHYROXINE SODIUM 112 MCG: 112 TABLET ORAL at 05:04

## 2019-05-02 RX ADMIN — GABAPENTIN 600 MG: 300 CAPSULE ORAL at 08:30

## 2019-05-02 RX ADMIN — SERTRALINE HYDROCHLORIDE 25 MG: 25 TABLET ORAL at 22:20

## 2019-05-02 RX ADMIN — FUROSEMIDE 20 MG: 20 TABLET ORAL at 08:36

## 2019-05-02 RX ADMIN — ACETAMINOPHEN 975 MG: 325 TABLET, FILM COATED ORAL at 05:04

## 2019-05-02 RX ADMIN — LOSARTAN POTASSIUM 50 MG: 50 TABLET, FILM COATED ORAL at 08:35

## 2019-05-02 RX ADMIN — CALCIUM CARBONATE-VITAMIN D TAB 500 MG-200 UNIT 1 TABLET: 500-200 TAB at 08:31

## 2019-05-02 RX ADMIN — CALCIUM CARBONATE-VITAMIN D TAB 500 MG-200 UNIT 1 TABLET: 500-200 TAB at 17:36

## 2019-05-02 RX ADMIN — METHOCARBAMOL 500 MG: 500 TABLET, FILM COATED ORAL at 11:59

## 2019-05-02 RX ADMIN — ACETAMINOPHEN 975 MG: 325 TABLET, FILM COATED ORAL at 13:27

## 2019-05-02 RX ADMIN — CEFAZOLIN SODIUM 1000 MG: 1 SOLUTION INTRAVENOUS at 04:20

## 2019-05-02 RX ADMIN — HEPARIN SODIUM 5000 UNITS: 5000 INJECTION INTRAVENOUS; SUBCUTANEOUS at 22:22

## 2019-05-02 RX ADMIN — OXYCODONE HYDROCHLORIDE 5 MG: 5 TABLET ORAL at 00:09

## 2019-05-02 RX ADMIN — METOPROLOL TARTRATE 25 MG: 25 TABLET, FILM COATED ORAL at 08:36

## 2019-05-02 RX ADMIN — Medication 1 TABLET: at 08:31

## 2019-05-03 ENCOUNTER — APPOINTMENT (INPATIENT)
Dept: RADIOLOGY | Facility: HOSPITAL | Age: 84
DRG: 472 | End: 2019-05-03
Payer: COMMERCIAL

## 2019-05-03 VITALS
SYSTOLIC BLOOD PRESSURE: 160 MMHG | WEIGHT: 196 LBS | RESPIRATION RATE: 18 BRPM | BODY MASS INDEX: 39.51 KG/M2 | HEIGHT: 59 IN | DIASTOLIC BLOOD PRESSURE: 52 MMHG | OXYGEN SATURATION: 98 % | TEMPERATURE: 98.2 F | HEART RATE: 60 BPM

## 2019-05-03 LAB — GLUCOSE SERPL-MCNC: 101 MG/DL (ref 65–140)

## 2019-05-03 PROCEDURE — 82948 REAGENT STRIP/BLOOD GLUCOSE: CPT

## 2019-05-03 PROCEDURE — 71046 X-RAY EXAM CHEST 2 VIEWS: CPT

## 2019-05-03 PROCEDURE — NC001 PR NO CHARGE: Performed by: PHYSICIAN ASSISTANT

## 2019-05-03 PROCEDURE — 99024 POSTOP FOLLOW-UP VISIT: CPT | Performed by: PHYSICIAN ASSISTANT

## 2019-05-03 PROCEDURE — 97116 GAIT TRAINING THERAPY: CPT

## 2019-05-03 RX ORDER — GABAPENTIN 400 MG/1
400 CAPSULE ORAL 2 TIMES DAILY
Qty: 28 CAPSULE | Refills: 0 | Status: SHIPPED | OUTPATIENT
Start: 2019-05-03 | End: 2019-05-22 | Stop reason: DRUGHIGH

## 2019-05-03 RX ORDER — ACETAMINOPHEN 325 MG/1
650 TABLET ORAL EVERY 6 HOURS PRN
Qty: 30 TABLET | Refills: 0
Start: 2019-05-03 | End: 2019-11-06 | Stop reason: HOSPADM

## 2019-05-03 RX ADMIN — CALCIUM CARBONATE-VITAMIN D TAB 500 MG-200 UNIT 1 TABLET: 500-200 TAB at 15:55

## 2019-05-03 RX ADMIN — SENNOSIDES 8.6 MG: 8.6 TABLET, FILM COATED ORAL at 09:20

## 2019-05-03 RX ADMIN — LEVOTHYROXINE SODIUM 112 MCG: 112 TABLET ORAL at 05:29

## 2019-05-03 RX ADMIN — LOSARTAN POTASSIUM 50 MG: 50 TABLET, FILM COATED ORAL at 09:20

## 2019-05-03 RX ADMIN — ACETAMINOPHEN 975 MG: 325 TABLET, FILM COATED ORAL at 15:55

## 2019-05-03 RX ADMIN — OXYCODONE HYDROCHLORIDE 5 MG: 5 TABLET ORAL at 09:27

## 2019-05-03 RX ADMIN — OXYCODONE HYDROCHLORIDE 5 MG: 5 TABLET ORAL at 03:56

## 2019-05-03 RX ADMIN — GABAPENTIN 400 MG: 400 CAPSULE ORAL at 15:55

## 2019-05-03 RX ADMIN — ACETAMINOPHEN 975 MG: 325 TABLET, FILM COATED ORAL at 05:29

## 2019-05-03 RX ADMIN — FUROSEMIDE 20 MG: 20 TABLET ORAL at 09:20

## 2019-05-03 RX ADMIN — OXYCODONE HYDROCHLORIDE 5 MG: 5 TABLET ORAL at 15:55

## 2019-05-03 RX ADMIN — CALCIUM CARBONATE-VITAMIN D TAB 500 MG-200 UNIT 1 TABLET: 500-200 TAB at 09:20

## 2019-05-03 RX ADMIN — HEPARIN SODIUM 5000 UNITS: 5000 INJECTION INTRAVENOUS; SUBCUTANEOUS at 15:55

## 2019-05-03 RX ADMIN — METOPROLOL TARTRATE 25 MG: 25 TABLET, FILM COATED ORAL at 09:20

## 2019-05-03 RX ADMIN — NYSTATIN: 100000 POWDER TOPICAL at 09:54

## 2019-05-03 RX ADMIN — METHOCARBAMOL 500 MG: 500 TABLET, FILM COATED ORAL at 10:28

## 2019-05-03 RX ADMIN — LIDOCAINE 2 PATCH: 50 PATCH CUTANEOUS at 09:20

## 2019-05-03 RX ADMIN — HEPARIN SODIUM 5000 UNITS: 5000 INJECTION INTRAVENOUS; SUBCUTANEOUS at 05:30

## 2019-05-03 RX ADMIN — Medication 1 TABLET: at 09:20

## 2019-05-03 RX ADMIN — HYDROXYCHLOROQUINE SULFATE 200 MG: 200 TABLET, FILM COATED ORAL at 09:20

## 2019-05-03 RX ADMIN — AMLODIPINE BESYLATE 5 MG: 5 TABLET ORAL at 09:20

## 2019-05-03 RX ADMIN — GABAPENTIN 400 MG: 400 CAPSULE ORAL at 09:20

## 2019-05-06 ENCOUNTER — TELEPHONE (OUTPATIENT)
Dept: NEUROSURGERY | Facility: CLINIC | Age: 84
End: 2019-05-06

## 2019-05-14 ENCOUNTER — CLINICAL SUPPORT (OUTPATIENT)
Dept: NEUROSURGERY | Facility: CLINIC | Age: 84
End: 2019-05-14

## 2019-05-14 VITALS — DIASTOLIC BLOOD PRESSURE: 78 MMHG | TEMPERATURE: 97.2 F | SYSTOLIC BLOOD PRESSURE: 130 MMHG

## 2019-05-14 DIAGNOSIS — Z98.890 POST-OPERATIVE STATE: Primary | ICD-10-CM

## 2019-05-14 PROCEDURE — 99024 POSTOP FOLLOW-UP VISIT: CPT

## 2019-05-21 DIAGNOSIS — G62.9 NEUROPATHY: Primary | ICD-10-CM

## 2019-05-21 DIAGNOSIS — S12.130K: ICD-10-CM

## 2019-05-22 ENCOUNTER — TELEPHONE (OUTPATIENT)
Dept: NEUROSURGERY | Facility: CLINIC | Age: 84
End: 2019-05-22

## 2019-05-22 DIAGNOSIS — M79.2 NERVE PAIN: Primary | ICD-10-CM

## 2019-05-22 RX ORDER — GABAPENTIN 300 MG/1
CAPSULE ORAL
Qty: 60 CAPSULE | Refills: 0 | Status: CANCELLED | OUTPATIENT
Start: 2019-05-22

## 2019-05-22 RX ORDER — GABAPENTIN 300 MG/1
CAPSULE ORAL
Qty: 60 CAPSULE | Refills: 0 | Status: ON HOLD | OUTPATIENT
Start: 2019-05-22 | End: 2019-11-05 | Stop reason: SDUPTHER

## 2019-05-30 ENCOUNTER — TELEPHONE (OUTPATIENT)
Dept: OBGYN CLINIC | Facility: HOSPITAL | Age: 84
End: 2019-05-30

## 2019-06-05 ENCOUNTER — TELEPHONE (OUTPATIENT)
Dept: OBGYN CLINIC | Facility: HOSPITAL | Age: 84
End: 2019-06-05

## 2019-06-07 ENCOUNTER — TELEPHONE (OUTPATIENT)
Dept: NEUROLOGY | Facility: CLINIC | Age: 84
End: 2019-06-07

## 2019-06-17 ENCOUNTER — TELEPHONE (OUTPATIENT)
Dept: NEUROSURGERY | Facility: CLINIC | Age: 84
End: 2019-06-17

## 2019-06-21 ENCOUNTER — APPOINTMENT (OUTPATIENT)
Dept: RADIOLOGY | Age: 84
End: 2019-06-21
Payer: COMMERCIAL

## 2019-06-21 DIAGNOSIS — S12.130K: ICD-10-CM

## 2019-06-21 PROCEDURE — 72040 X-RAY EXAM NECK SPINE 2-3 VW: CPT

## 2019-06-25 ENCOUNTER — OFFICE VISIT (OUTPATIENT)
Dept: NEUROSURGERY | Facility: CLINIC | Age: 84
End: 2019-06-25

## 2019-06-25 VITALS
HEART RATE: 64 BPM | WEIGHT: 196 LBS | HEIGHT: 59 IN | DIASTOLIC BLOOD PRESSURE: 57 MMHG | SYSTOLIC BLOOD PRESSURE: 149 MMHG | TEMPERATURE: 96.5 F | BODY MASS INDEX: 39.51 KG/M2 | RESPIRATION RATE: 16 BRPM

## 2019-06-25 DIAGNOSIS — Z98.1 S/P CERVICAL SPINAL FUSION: Primary | ICD-10-CM

## 2019-06-25 PROCEDURE — 99024 POSTOP FOLLOW-UP VISIT: CPT | Performed by: NEUROLOGICAL SURGERY

## 2019-06-25 RX ORDER — CHLORAL HYDRATE 500 MG
CAPSULE ORAL
COMMUNITY
End: 2020-09-22

## 2019-06-25 NOTE — PROGRESS NOTES
[facility] Neurosurgery Office Note  Aubrey Herzog is a 80 y o  female    Hand Dominance: Right    Type of Visit: Post-op      Diagnoses and all orders for this visit:    S/P cervical spinal fusion  -     XR spine cervical complete 6+ vw flex/ext/obl; Future  -     Ambulatory referral to Physical Therapy; Future          DISCUSSION SUMMARY  44-year-old female status post a C1-C2 lateral mass fixation fusion who is doing well  Follow-up imaging studies are essential to ensure that the fusion continues    Continued follow up is advised in 3 months' time with repeat x-rays to ensure that instrumentation is still intact  CHIEF COMPLAINT  Patient presents for 6 week post-op visit     NEUROSURGERY PROCEDURES  5/1/19 (DKO) C1-C2 lateral mass fixation fusion with possible sublaminar cables    HISTORY OF PRESENT ILLNESS  This patient presented to the outpatient office for follow up of a C2 fracture with anterolisthesis s/p fall in January  Patient underwent C1-2 lateral mass fixation fusion with sublaminar cables under general anesthesia with minimal blood loss and no complications  The patient has no complaints at this point  She has no problems with her incision  The numbness and tingling which she had posteriorly on the scalp is now gone    REVIEW OF SYSTEMS  Review of Systems   Constitutional: Positive for activity change (wearing cervical collar)  HENT: Negative  Eyes: Negative  Respiratory: Negative  Cardiovascular: Negative  Gastrointestinal: Negative  Endocrine: Negative  Genitourinary: Negative  Musculoskeletal: Positive for gait problem (ambulating with a walker) and neck stiffness (due to collar)  Skin: Negative  Allergic/Immunologic: Negative  Neurological:        Pressure and discomfort on the right side of her face   Hematological: Negative  Psychiatric/Behavioral: Negative  All other systems reviewed and are negative      I reviewed the ROS    MEDICAL HISTORY  Active Ambulatory Problems     Diagnosis Date Noted    TIA (transient ischemic attack) 09/23/2016    UTI (urinary tract infection) 09/23/2016    Hypertension 09/23/2016    Diabetes ([address] ) 09/23/2016    Hypothyroidism 09/23/2016    HX: breast cancer 09/23/2016    H/O: CVA (cerebrovascular accident) 09/23/2016    Osteoporosis 09/23/2016    Arthritis 09/23/2016    Fibromyalgia 09/23/2016    Bilateral malignant neoplasm of breast in female, estrogen receptor positive ([address] ) 07/10/2018    Closed nondisplaced fracture of second cervical vertebra ([facility] [address] ) 01/09/2019    Neck pain, acute 01/09/2019    Type III fracture of odontoid process ([facility] [address] ) 01/09/2019    C6 cervical fracture ([facility] [address] ) 01/09/2019    Hypertension 01/09/2019    Hypothyroidism 01/09/2019    Fall 01/10/2019    Ambulatory dysfunction 01/10/2019    Acute pain 01/10/2019    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    Chronic diastolic heart failure ([address] ) 01/17/2019    History of CVA (cerebrovascular accident) 01/17/2019    Type 2 diabetes mellitus with diabetic polyneuropathy ([address] ) 01/17/2019    Depression 01/17/2019    Stage 3 chronic kidney disease ([address] ) 01/17/2019    Rheumatoid arthritis involving multiple sites ([facility] [address] ) 01/17/2019    Fibromyalgia 01/17/2019    History of aortic valve replacement with bioprosthetic valve 01/17/2019    Renal artery stenosis (HCC) 01/24/2019    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    Pain 01/29/2019    Lesion of left lung 01/29/2019    Trigger point 02/05/2019    Dermatitis associated with moisture 02/05/2019    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 04/02/2019     Resolved Ambulatory Problems     Diagnosis Date Noted    Forgetfulness 01/10/2019    Physical deconditioning 01/10/2019    Delirium 01/11/2019    Hyponatremia 01/21/2019     Past Medical History:   Diagnosis Date    Breast cancer ([facility] [address] )     Cardiac disease     CHF [facility]congestive heart failure) ([facility] [address] )     Compression fracture of body of thoracic vertebra (HCC)     CVA (cerebral vascular accident) ([facility] [address] )     [email or web address]     Disease of thyroid gland     Fibromyalgia, primary     Hyperlipidemia     Neuropathy     Pressure injury of skin     Renal disorder     Stroke [facility])        Past Surgical History:   Procedure Laterality Date    AORTIC VALVE SURGERY      BREAST SURGERY      lumpectomy for breast cancer    CATARACT EXTRACTION      CHOLECYSTECTOMY      HYSTERECTOMY  1978    KIDNEY SURGERY      stent placed for kidney    OTHER SURGICAL HISTORY      abdominal aneurysm surgery    RI ARTHRODESIS POSTERIOR ATLAS-AXIS C1-C2 N/A 5/1/2019    Procedure: C1-C2 lateral mass fixation fusion with possible sublaminar cables;  Surgeon: Adolphus Boas, MD;  [facility]: [facility];  Service: Neurosurgery    REPLACEMENT TOTAL KNEE      left        Social History     Tobacco Use   Smoking Status Never Smoker   Smokeless Tobacco Never Used       Social History     Substance and Sexual Activity   Alcohol Use Not Currently       Social History     Substance and Sexual Activity   Drug Use No       Vitals:    06/25/19 1012   BP: 149/57   BP Location: Right arm   Patient Position: Sitting   Cuff Size: Adult   Pulse: 64   Resp: 16   Temp: (!) 96 5 °F (35 8 °C)   Weight: 88 9 kg (196 lb)   Height: 4' 11" (1 499 m)         Current Outpatient Medications:     acetaminophen (TYLENOL) 325 mg tablet, Take 2 tablets (650 mg total) by mouth every 6 (six) hours as needed for mild pain (Patient taking differently: Take 1,000 mg by mouth 3 (three) times a day as needed for mild pain ), Disp: 30 tablet, Rfl: 0    albuterol (PROVENTIL HFA,VENTOLIN HFA) 90 mcg/act inhaler, Inhale 2 puffs every 4 (four) hours as needed for wheezing, Disp: , Rfl:     amLODIPine (NORVASC) 5 mg tablet, Take 5 mg by mouth every morning , Disp: , Rfl:     calcium carbonate-vitamin D (OSCAL-D) 500 mg-200 units per tablet, Take 1 tablet by mouth 2 (two) times a day with meals  , Disp: , Rfl:     docusate sodium (COLACE) 100 mg capsule, Take 1 capsule (100 mg total) by mouth 2 (two) times a day as needed for constipation (Patient taking differently: Take 100 mg by mouth daily ), Disp: 30 capsule, Rfl: 0    furosemide (LASIX) 20 mg tablet, Take 20 mg by mouth daily , Disp: , Rfl:     gabapentin (NEURONTIN) 300 mg capsule, Take 2 capsules PO three times a day, Disp: 60 capsule, Rfl: 0    HYDROcodone-acetaminophen (NORCO) 5-325 mg per tablet, Take 1 tablet by mouth every 6 (six) hours as needed for pain, Disp: , Rfl:     hydroxychloroquine (PLAQUENIL) 200 mg tablet, Take 200 mg by mouth daily , Disp: , Rfl:     Levothyroxine Sodium 112 MCG CAPS, Take by mouth daily, Disp: , Rfl:     lidocaine (LIDODERM) 5 %, Apply 2 patches topically daily Remove & Discard patch within 12 hours or as directed by MD  , Disp: , Rfl:     losartan (COZAAR) 100 MG tablet, Take 0 5 tablets (50 mg total) by mouth daily, Disp: 15 tablet, Rfl: 0    methocarbamol (ROBAXIN) 500 mg tablet, Take 1 tablet (500 mg total) by mouth 3 (three) times a day as needed for muscle spasms, Disp: 90 tablet, Rfl: 0    metoprolol tartrate (LOPRESSOR) 25 mg tablet, Take 25 mg by mouth 2 (two) times a day , Disp: , Rfl:     Multiple Vitamins-Calcium (MULTI-DAY/CALCIUM/EXTRA IRON PO), Take 1 tablet by mouth daily, Disp: , Rfl:     Multiple Vitamins-Minerals (CENTRUM ADULTS PO), Centrum, Disp: , Rfl:     nitroglycerin (NITROSTAT) 0 4 mg SL tablet, Place under the tongue every 5 (five) minutes as needed for chest pain , Disp: , Rfl:     nystatin (MYCOSTATIN) powder, Apply topically as needed , Disp: , Rfl:     Omega-3 Fatty Acids (FISH OIL) 1,000 mg, Fish Oil 1,000 mg capsule, Disp: , Rfl:     sertraline (ZOLOFT) 25 mg tablet, 25 mg daily at bedtime , Disp: , Rfl:      Allergies   Allergen Reactions    Duloxetine Hcl Other (See Comments) and Hypertension    Duloxetine Hcl Cymbalta;  Hemorrhagic stroke listed as reaction    Escitalopram      Other reaction(s): Urinary Retention    Pregabalin      Other reaction(s): Hypertension    Statins Myalgia    Tramadol      Other reaction(s): Hypertension    Triprolidine-Pse Other (See Comments)    Anastrozole Abdominal Pain    Anastrozole     Antihistamines, Diphenhydramine-Type     Exemestane      Aromasin; Muscle pain & cramps    Lexapro [Escitalopram]      Urinary retention    Lyrica [Pregabalin]      hypertension    Metformin      diarrhea    Oxycodone     Statins      Muscle pain & cramps    Tramadol      hypertension    Triprolidine-Pseudoephedrine     Metformin Diarrhea        The following portions of the patient's history were updated by MA and reviewed by MD: allergies, current medications, past family history, past medical history, past social history, past surgical history and problem list       Physical Exam  Awake and alert  Incision clean and dry and well healed  Power in the upper extremities is 5/5 bilaterally    RESULTS/DATA  X-rays cervical spine demonstrate the instrumentation to be in ideal position without signs of breakage or loosening

## 2019-06-25 NOTE — PATIENT INSTRUCTIONS
Return for office visit in 3 months' time with a repeat set of flex/ext x-rays of the cervical spine  Start weaning out of collar for the next 2 weeks and commence course of physical therapy after that time

## 2019-06-25 NOTE — LETTER
July 8, 2019     Ashley Savannah, DO  [address] [facility] [address]    Patient: Christoph Mcclure   YOB: 1934   Date of Visit: 6/25/2019       Dear Dr Cortney Berg: Thank you for referring David Matute to me for evaluation  Below are my notes for this consultation  If you have questions, please do not hesitate to call me  I look forward to following your patient along with you  Sincerely,        Farzaneh Mendez MD        CC: No Recipients  Farzaneh Mendez MD  7/1/2019 10:34 AM  Signed  [facility] Note  Christoph Mcclure is a 80 y o  female    Hand Dominance: Right    Type of Visit: Post-op      Diagnoses and all orders for this visit:    S/P cervical spinal fusion  -     XR spine cervical complete 6+ vw flex/ext/obl; Future  -     Ambulatory referral to Physical Therapy; Future          DISCUSSION SUMMARY  20-year-old female status post a C1-C2 lateral mass fixation fusion who is doing well  Follow-up imaging studies are essential to ensure that the fusion continues    Continued follow up is advised in 3 months' time with repeat x-rays to ensure that instrumentation is still intact  CHIEF COMPLAINT  Patient presents for 6 week post-op visit     NEUROSURGERY PROCEDURES  5/1/19 (DKO) C1-C2 lateral mass fixation fusion with possible sublaminar cables    HISTORY OF PRESENT ILLNESS  This patient presented to the outpatient office for follow up of a C2 fracture with anterolisthesis s/p fall in January  Patient underwent C1-2 lateral mass fixation fusion with sublaminar cables under general anesthesia with minimal blood loss and no complications  The patient has no complaints at this point  She has no problems with her incision  The numbness and tingling which she had posteriorly on the scalp is now gone    REVIEW OF SYSTEMS  Review of Systems   Constitutional: Positive for activity change (wearing cervical collar)  HENT: Negative  Eyes: Negative  Respiratory: Negative  Cardiovascular: Negative  Gastrointestinal: Negative  Endocrine: Negative  Genitourinary: Negative  Musculoskeletal: Positive for gait problem (ambulating with a walker) and neck stiffness (due to collar)  Skin: Negative  Allergic/Immunologic: Negative  Neurological:        Pressure and discomfort on the right side of her face   Hematological: Negative  Psychiatric/Behavioral: Negative  All other systems reviewed and are negative      I reviewed the ROS    MEDICAL HISTORY  Active Ambulatory Problems     Diagnosis Date Noted    TIA (transient ischemic attack) 09/23/2016    UTI (urinary tract infection) 09/23/2016    Hypertension 09/23/2016    Diabetes ([address] ) 09/23/2016    Hypothyroidism 09/23/2016    HX: breast cancer 09/23/2016    H/O: CVA (cerebrovascular accident) 09/23/2016    Osteoporosis 09/23/2016    Arthritis 09/23/2016    Fibromyalgia 09/23/2016    Bilateral malignant neoplasm of breast in female, estrogen receptor positive ([address] ) 07/10/2018    Closed nondisplaced fracture of second cervical vertebra ([facility] ) 01/09/2019    Neck pain, acute 01/09/2019    Type III fracture of odontoid process ([address] ) 01/09/2019    C6 cervical fracture ([address] ) 01/09/2019    Hypertension 01/09/2019    Hypothyroidism 01/09/2019    Fall 01/10/2019    Ambulatory dysfunction 01/10/2019    Acute pain 01/10/2019    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    Chronic diastolic heart failure ([address] ) 01/17/2019    History of CVA (cerebrovascular accident) 01/17/2019    Type 2 diabetes mellitus with diabetic polyneuropathy ([address] ) 01/17/2019    Depression 01/17/2019    Stage 3 chronic kidney disease ([address] ) 01/17/2019    Rheumatoid arthritis involving multiple sites ([address] ) 01/17/2019    Fibromyalgia 01/17/2019    History of aortic valve replacement with bioprosthetic valve 01/17/2019    Renal artery stenosis ([address] ) 01/24/2019    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    Pain 01/29/2019    Lesion of left lung 01/29/2019    Trigger point 02/05/2019    Dermatitis associated with moisture 02/05/2019    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 04/02/2019     Resolved Ambulatory Problems     Diagnosis Date Noted    Forgetfulness 01/10/2019    Physical deconditioning 01/10/2019    Delirium 01/11/2019    Hyponatremia 01/21/2019     Past Medical History:   Diagnosis Date    Breast cancer ([facility] )     Cardiac disease     [facility]     Compression fracture of body of thoracic vertebra ([facility])     CVA (cerebral vascular accident) ([facility] )     Diabetes mellitus ([facility] )     Disease of thyroid gland     Fibromyalgia, primary     Hyperlipidemia     Neuropathy     Pressure injury of skin     Renal disorder     Stroke ([facility] )        Past Surgical History:   Procedure Laterality Date    AORTIC VALVE SURGERY      BREAST SURGERY      lumpectomy for breast cancer    CATARACT EXTRACTION      CHOLECYSTECTOMY      HYSTERECTOMY  1978    KIDNEY SURGERY      stent placed for kidney    OTHER SURGICAL HISTORY      abdominal aneurysm surgery    UT ARTHRODESIS POSTERIOR ATLAS-AXIS C1-C2 N/A 5/1/2019    Procedure: C1-C2 lateral mass fixation fusion with possible sublaminar cables;  Surgeon: Juani Stacy MD;  Location: [facility];  Service: Neurosurgery    REPLACEMENT TOTAL KNEE      left        Social History     Tobacco Use   Smoking Status Never Smoker   Smokeless Tobacco Never Used       Social History     Substance and Sexual Activity   Alcohol Use Not Currently       Social History     Substance and Sexual Activity   Drug Use No       Vitals:    06/25/19 1012   BP: 149/57   BP Location: Right arm   Patient Position: Sitting   Cuff Size: Adult   Pulse: 64   Resp: 16   Temp: (!) 96 5 °F (35 8 °C)   Weight: 88 9 kg (196 lb)   Height: 4' 11" (1 499 m)         Current Outpatient Medications:     acetaminophen (TYLENOL) 325 mg tablet, Take 2 tablets (650 mg total) by mouth every 6 (six) hours as needed for mild pain (Patient taking differently: Take 1,000 mg by mouth 3 (three) times a day as needed for mild pain ), Disp: 30 tablet, Rfl: 0    albuterol (PROVENTIL HFA,VENTOLIN HFA) 90 mcg/act inhaler, Inhale 2 puffs every 4 (four) hours as needed for wheezing, Disp: , Rfl:     amLODIPine (NORVASC) 5 mg tablet, Take 5 mg by mouth every morning , Disp: , Rfl:     calcium carbonate-vitamin D (OSCAL-D) 500 mg-200 units per tablet, Take 1 tablet by mouth 2 (two) times a day with meals  , Disp: , Rfl:     docusate sodium (COLACE) 100 mg capsule, Take 1 capsule (100 mg total) by mouth 2 (two) times a day as needed for constipation (Patient taking differently: Take 100 mg by mouth daily ), Disp: 30 capsule, Rfl: 0    furosemide (LASIX) 20 mg tablet, Take 20 mg by mouth daily , Disp: , Rfl:     gabapentin (NEURONTIN) 300 mg capsule, Take 2 capsules PO three times a day, Disp: 60 capsule, Rfl: 0    HYDROcodone-acetaminophen (NORCO) 5-325 mg per tablet, Take 1 tablet by mouth every 6 (six) hours as needed for pain, Disp: , Rfl:     hydroxychloroquine (PLAQUENIL) 200 mg tablet, Take 200 mg by mouth daily , Disp: , Rfl:     Levothyroxine Sodium 112 MCG CAPS, Take by mouth daily, Disp: , Rfl:     lidocaine (LIDODERM) 5 %, Apply 2 patches topically daily Remove & Discard patch within 12 hours or as directed by MD  , Disp: , Rfl:     losartan (COZAAR) 100 MG tablet, Take 0 5 tablets (50 mg total) by mouth daily, Disp: 15 tablet, Rfl: 0    methocarbamol (ROBAXIN) 500 mg tablet, Take 1 tablet (500 mg total) by mouth 3 (three) times a day as needed for muscle spasms, Disp: 90 tablet, Rfl: 0    metoprolol tartrate (LOPRESSOR) 25 mg tablet, Take 25 mg by mouth 2 (two) times a day , Disp: , Rfl:     Multiple Vitamins-Calcium (MULTI-DAY/CALCIUM/EXTRA IRON PO), Take 1 tablet by mouth daily, Disp: , Rfl:     Multiple Vitamins-Minerals (CENTRUM ADULTS PO), Centrum, Disp: , Rfl:     nitroglycerin (NITROSTAT) 0 4 mg SL tablet, Place under the tongue every 5 (five) minutes as needed for chest pain , Disp: , Rfl:     nystatin (MYCOSTATIN) powder, Apply topically as needed , Disp: , Rfl:     Omega-3 Fatty Acids (FISH OIL) 1,000 mg, Fish Oil 1,000 mg capsule, Disp: , Rfl:     sertraline (ZOLOFT) 25 mg tablet, 25 mg daily at bedtime , Disp: , Rfl:      Allergies   Allergen Reactions    Duloxetine Hcl Other (See Comments) and Hypertension    Duloxetine Hcl      Cymbalta;  Hemorrhagic stroke listed as reaction    Escitalopram      Other reaction(s): Urinary Retention    Pregabalin      Other reaction(s): Hypertension    Statins Myalgia    Tramadol      Other reaction(s): Hypertension    Triprolidine-Pse Other (See Comments)    Anastrozole Abdominal Pain    Anastrozole     Antihistamines, Diphenhydramine-Type     Exemestane      Aromasin; Muscle pain & cramps    Lexapro [Escitalopram]      Urinary retention    Lyrica [Pregabalin]      hypertension    Metformin      diarrhea    Oxycodone     Statins      Muscle pain & cramps    Tramadol      hypertension    Triprolidine-Pseudoephedrine     Metformin Diarrhea        The following portions of the patient's history were updated by MA and reviewed by MD: allergies, current medications, past family history, past medical history, past social history, past surgical history and problem list       Physical Exam  Awake and alert  Incision clean and dry and well healed  Power in the upper extremities is 5/5 bilaterally    RESULTS/DATA  X-rays cervical spine demonstrate the instrumentation to be in ideal position without signs of breakage or loosening

## 2019-07-09 ENCOUNTER — EVALUATION (OUTPATIENT)
Dept: PHYSICAL THERAPY | Age: 84
End: 2019-07-09
Payer: COMMERCIAL

## 2019-07-09 VITALS — SYSTOLIC BLOOD PRESSURE: 122 MMHG | DIASTOLIC BLOOD PRESSURE: 58 MMHG

## 2019-07-09 DIAGNOSIS — Z98.1 S/P CERVICAL SPINAL FUSION: Primary | ICD-10-CM

## 2019-07-09 PROCEDURE — 97162 PT EVAL MOD COMPLEX 30 MIN: CPT | Performed by: PHYSICAL THERAPIST

## 2019-07-09 PROCEDURE — 97116 GAIT TRAINING THERAPY: CPT | Performed by: PHYSICAL THERAPIST

## 2019-07-09 PROCEDURE — 97110 THERAPEUTIC EXERCISES: CPT | Performed by: PHYSICAL THERAPIST

## 2019-07-09 NOTE — PROGRESS NOTES
PT Evaluation     Today's date: 2019  Patient name: Prudence Clark  : 1934  MRN: [id number]  Referring provider: Praneeth Parker MD  Dx:   Encounter Diagnosis     ICD-10-CM    1  S/P cervical spinal fusion Z98 1 Ambulatory referral to Physical Therapy       Start Time: 1100  Stop Time: 1200  Total time in clinic (min): 60 minutes    Assessment  Assessment details: Pt presents with signs and symptoms synonymous with C1-C2 spinal fusion and a mechanical assessment of decreased LE/UE strength, range of motion, general deconditioning, and reduced step length during ambulation with use of rolling walker  Patient reports prior left knee issues causing her to have used AD since   Also limited RLE strength due to prior stroke  Pt prognosis is fair to good due to spinal fusion limiting range of motion  Patient was instructed in proper use of rolling walker to promote safety as well as instructed in HEP  Positive prognostic indicators include compliance with HEP at home, family support, and motivated  Pt will benefit from continued skilled physical therapy to improve IADLs independently, strength, and improve overall function     Impairments: abnormal gait, abnormal or restricted ROM, impaired balance, impaired physical strength, lacks appropriate home exercise program and poor posture   Other impairment: fall risk  Understanding of Dx/Px/POC: good  Plan  Plan details: 2-3 times per week/8 weeks  Patient would benefit from: skilled physical therapy  Planned therapy interventions: neuromuscular re-education, patient education, strengthening, therapeutic activities, therapeutic exercise, therapeutic training, functional ROM exercises, gait training, graded exercise, home exercise program, balance, ADL training and abdominal trunk stabilization  Plan of Care beginning date: 2019  Plan of Care expiration date: 9/3/2019  Treatment plan discussed with: family and patient        Subjective Evaluation    History of Present Illness  Date of surgery: 5/1/2019  Mechanism of injury: Patient reports that in January she lost her balance while using two SPC due to prior knee replacement and fell forward causing her to hit the top of her head  After the fall she was admitted to the hospital for two weeks and was placed in a hard collar (Dx C2 fracture and C6 fracture)  Patient received in-home physical therapy while fracture was healing but was not successful  Underwent C1-C2 spinal fusion in May patient was recommended by physician to try physical therapy for strengthening, balance, and deconditioning  Previously was receiving in home therapy  Currently reports that she is sleeping on a recliner and denies any difficulty to stay asleep  Pt denies difficulty of upper and lower extremity paresthesia, B/B changes, and saddle paresthesia  Denies dizziness, dysphagia, dysarthria, diplopia, nor drop attacks, nor nystagmus, facial numbness, nausea  She states that her pain symptoms are reduced and reports that she has a deep pressure on the right lateral aspect of her face that has slowly improving  Had heightened sensitivity on crown of head which has dissipated  Currently ambulating with rolling walker and reports that she must use a chair with arm rests to get up  Not a recurrent problem   Quality of life: fair    Pain  No pain reported  Location: bilateral lower cervical   Quality: tight  Relieving factors: relaxation    Social Support  Steps to enter house: no  Stairs in house: yes (1)   Lives in: one-story house    Hand dominance: right    Treatments  Previous treatment: occupational therapy, physical therapy and immobilization  Discharged from (in last 30 days): skilled nursing facility and home health care  Patient Goals  Patient goal: Improve ambulation and reaching overhead        Objective    Cervical % of normal   Flex   75%   Extn  0%   SB Left 50%   SB Right 25%   ROT Left 25%   ROT Right 25%         MMT         AROM Shoulder       L       R        L           R   Flex  5 5 WFL WFL   Extn  5 5 WNL WNL   Abd  5 5 90 90   IR  5 5 WNL WNL   ER  5 5                   MMT    Elbow         L        R   Flex  5 5   Extn  5 5   Wrist     Flex 5 5   Extn  5 5    strength            Head positioning: forward head, decreased lordosis  Dermatome: (light touch): = B/L UE, B/L LE   Reflexes:  (L/R) C5:        C6:            C7: No cervical mobility testing performing nor ligamentous testing  GAIT: decreased gait speed with rolling walker, forward trunk lean with reach to rolling walker  Squat assess: 25% with UE assist  Sit to stand: requires BUE assist  SLS: deferred  Rhomber sec  EO, 30 sec sway EC  TUGx10 feet: 63 seconds  Required mod assist from sit to stand without arm rests present  5x STS: 51 sec  Lumbar  % of normal   Flex  50   Extn  25   SB Left 25   SB Right 25   ROT Left 50   ROT Right 50   Testing in seated position      MMT    Hip       L       R   Flex  3+ 2+p! Extn  N/T N/T   Abd  4 4   Add  4+ 4+   IR  4 3+   ER  3+ 3+                 MMT    Knee         L        R   Flex  5 5   Extn  5 4-          Tested in seated             MMT    Ankle       L        R   PF 2+ 2+   DF  3+ 3+   EHL 4 4      Reflexes:  (L/R) L4:   0 BLE     S1:  0 BLE       Flowsheet Rows      Most Recent Value   PT/OT G-Codes   Current Score  36   Projected Score  50      Precautions: hx of Type 2 DM and breast cx, HTN, hx of strokes (right side involvement),     Manual                          Exercise Diary 7 9       Seated heel raise 2x10       Stand hip abd  3x10       Stand march 3x10       LAQ 3x10       Feet togeth EC 3x30''                                         Patient provided verbal consent to treatment plan and recommended interventions  Short Term:  1  Pt will demonstrate improved TUG score by 5 seconds in 4 weeks   2  Pt will demonstrate improved LE strength by 1 grade in 4 weeks     3  Pt will be able to improve 5x STS score by 5 seconds in 4 weeks  4    Pt will be able to ambulate > 5 minutes with assist device in 4 weeks  Long Term:  1  Pt will be independent in their HEP in 8 weeks  2  Pt will be able to improve Rhomberg sway with EC in 8 weeks to reduce fall risk  3  Pt will demonstrate improved LE strength by 2 grades in 8 weeks  4  Pt will be able to perform sit to stand transfer without assistance in 8 weeks        5  Pt will be able to ambulate > 10-15 minutes with rolling walker in 8 weeks

## 2019-07-16 ENCOUNTER — OFFICE VISIT (OUTPATIENT)
Dept: PHYSICAL THERAPY | Age: 84
End: 2019-07-16
Payer: COMMERCIAL

## 2019-07-16 DIAGNOSIS — Z98.1 S/P CERVICAL SPINAL FUSION: Primary | ICD-10-CM

## 2019-07-16 PROCEDURE — 97112 NEUROMUSCULAR REEDUCATION: CPT | Performed by: PHYSICAL THERAPIST

## 2019-07-16 PROCEDURE — 97110 THERAPEUTIC EXERCISES: CPT | Performed by: PHYSICAL THERAPIST

## 2019-07-16 NOTE — PROGRESS NOTES
Daily Note     Today's date: 2019  Patient name: Umair Monroy  : 1934  MRN: [id number]  Referring provider: Magaly Somers MD  Dx:   Encounter Diagnosis     ICD-10-CM    1  S/P cervical spinal fusion Z98 1        Start Time: 1445  Stop Time: 1545  Total time in clinic (min): 60 minutes    Subjective: Pt presents today stating that she is feeling better today but continues to feel stiff in upper cervical region  Pt reports that she was fatigued and sore after initial evaluation but symptoms improved since  Patient co-treated by Physical Therapy Student, Camden Pedro, under my direct supervision  Objective: See treatment diary below      Assessment: Patient tolerated treatment well with minimal fatigue at the end of the session  Provided cervical-thoracic p/a mobilizations to improve range of motion  Will continue to progress lower extremity strength as well as balance intervention  Patient reported not wanting to perform table exercises lying down next time as she is not very comfortable doing so as well as high sensitivity reported with use of pillow behind head  Exercises modified due to only trace muscle activation RLE  Plan: Continue to progress treatment as tolerated  Precautions: hx of Type 2 DM and breast cx, HTN, hx of strokes (right side involvement),      Manual  7 16           Upper T-spine with cervical exn 2x10                            Exercise Diary 7 9  7 16         Seated heel raise 2x10  2x10         Stand hip abd   3x10  3x10 ea          Stand march 3x10  3x10 ea          LAQ 3x10  3x10         Feet together EC 3x30''  3x30"         seated posture   3x30"          semi-rec  glute set    2x10          Biodex LOS    3x, lvl 2         Rocker board  2x20      TA draw BKFO  2x10      recumb bike        Standing weight shifts        Reaching for cones

## 2019-07-18 ENCOUNTER — OFFICE VISIT (OUTPATIENT)
Dept: PHYSICAL THERAPY | Age: 84
End: 2019-07-18
Payer: COMMERCIAL

## 2019-07-18 DIAGNOSIS — Z98.1 S/P CERVICAL SPINAL FUSION: Primary | ICD-10-CM

## 2019-07-18 PROCEDURE — 97112 NEUROMUSCULAR REEDUCATION: CPT | Performed by: PHYSICAL THERAPIST

## 2019-07-18 PROCEDURE — 97110 THERAPEUTIC EXERCISES: CPT | Performed by: PHYSICAL THERAPIST

## 2019-07-18 NOTE — PROGRESS NOTES
Daily Note     Today's date: 2019  Patient name: Aubrey Herzog  : 1934  MRN: [id number]  Referring provider: Shilpi Bradford MD  Dx:   Encounter Diagnosis     ICD-10-CM    1  S/P cervical spinal fusion Z98 1        Start Time: 1445  Stop Time: 1530  Total time in clinic (min): 45 minutes    Subjective: Patient reports that her right leg is bothering her today and her neck is feeling stiff  Patient co-treated by Physical Therapy Student, Dee Amin, under my direct supervision  Objective: See treatment diary below      Assessment: Patient reported fatigue with treatment today  Improved tolerance to standing exercises noted  Plan: Continue to progress treatment as tolerated  Precautions: hx of Type 2 DM and breast cx, HTN, hx of strokes (right side involvement),      Manual  7 16  7 18         Upper T-spine with cervical exn 2x10  2x10                          Exercise Diary 7 9  7 16  7 18       Seated heel raise 2x10  2x10  2x20       Stand hip abd   3x10  3x10 ea   3x10 EA        Stand march 3x10  3x10 ea          LAQ 3x10  3x10  3x10 ea        Feet together EC 3x30''  3x30"        seated posture   3x30"         stand glute set    2x10 2x10        Biodex LOS    3x, lvl 2  3x maze       Rocker board  2x20 2x15 a/p, m/l     TA draw BKFO  2x10      recumb bike        Standing weight shifts        Reaching for cones   5'     Stand hamstring curl   2x10 ea

## 2019-07-19 ENCOUNTER — APPOINTMENT (OUTPATIENT)
Dept: PHYSICAL THERAPY | Age: 84
End: 2019-07-19
Payer: COMMERCIAL

## 2019-07-26 ENCOUNTER — OFFICE VISIT (OUTPATIENT)
Dept: PHYSICAL THERAPY | Age: 84
End: 2019-07-26
Payer: COMMERCIAL

## 2019-07-26 DIAGNOSIS — Z98.1 S/P CERVICAL SPINAL FUSION: Primary | ICD-10-CM

## 2019-07-26 PROCEDURE — 97112 NEUROMUSCULAR REEDUCATION: CPT

## 2019-07-26 PROCEDURE — 97110 THERAPEUTIC EXERCISES: CPT

## 2019-07-26 NOTE — PROGRESS NOTES
Daily Note     Today's date: 2019  Patient name: Andres Brown  : 1934  MRN: [id number]  Referring provider: Merline Shores, MD  Dx:   Encounter Diagnosis     ICD-10-CM    1  S/P cervical spinal fusion Z98 1                   Subjective: pt reports     Objective: See treatment diary below      Assessment: Tolerated treatment well  Patient demonstrated fatigue post treatment and would benefit from continued PT, occas LOB during balance ex, but pt able to recover without assist, sit to stand txfer difficulty noted but pt able to do with much effort      Plan: Continue per plan of care  Progress treatment as tolerated  Precautions: hx of Type 2 DM and breast cx, HTN, hx of strokes (right side involvement),      Manual  7 16  7 18         Upper T-spine with cervical exn 2x10  2x10                          Exercise Diary 7 9  7 16  7 18  19     Seated heel raise 2x10  2x10  2x20  2x20     Stand hip abd  3x10  3x10 ea   3x10 EA   3x10 ea     Stand march 3x10  3x10 ea   3x10 ea  3x10 ea     LAQ 3x10  3x10  3x10 ea   3x10x5"     Feet together EC 3x30''  3x30"   3x30"     seated posture   3x30"         stand glute set    2x10 2x10  3x10x5"      Biodex LOS    3x, lvl 2  3x maze  LOS static level 2x3,maze static level 1x3     Rocker board  2x20 2x15 a/p, m/l 2x15 each direction    TA draw BKFO  2x10      recumb bike        Standing weight shifts    10x10"    Reaching for cones   5' 5'(no UE support)    Stand hamstring curl   2x10 ea   3x10 ea

## 2019-07-30 ENCOUNTER — APPOINTMENT (OUTPATIENT)
Dept: PHYSICAL THERAPY | Age: 84
End: 2019-07-30
Payer: COMMERCIAL

## 2019-08-16 ENCOUNTER — APPOINTMENT (INPATIENT)
Dept: NON INVASIVE DIAGNOSTICS | Facility: HOSPITAL | Age: 84
DRG: 291 | End: 2019-08-16
Payer: COMMERCIAL

## 2019-08-16 ENCOUNTER — HOSPITAL ENCOUNTER (INPATIENT)
Facility: HOSPITAL | Age: 84
LOS: 4 days | Discharge: HOME | DRG: 291 | End: 2019-08-20
Attending: EMERGENCY MEDICINE | Admitting: INTERNAL MEDICINE
Payer: COMMERCIAL

## 2019-08-16 ENCOUNTER — APPOINTMENT (EMERGENCY)
Dept: RADIOLOGY | Facility: HOSPITAL | Age: 84
DRG: 291 | End: 2019-08-16
Payer: COMMERCIAL

## 2019-08-16 DIAGNOSIS — I50.33 ACUTE ON CHRONIC DIASTOLIC HEART FAILURE (HCC): ICD-10-CM

## 2019-08-16 DIAGNOSIS — N90.89 LABIAL LESION: ICD-10-CM

## 2019-08-16 DIAGNOSIS — I21.4 NSTEMI (NON-ST ELEVATED MYOCARDIAL INFARCTION) (HCC): Primary | ICD-10-CM

## 2019-08-16 DIAGNOSIS — I50.9 CHF EXACERBATION (HCC): ICD-10-CM

## 2019-08-16 DIAGNOSIS — N17.9 AKI (ACUTE KIDNEY INJURY) (HCC): ICD-10-CM

## 2019-08-16 PROBLEM — R77.8 ELEVATED TROPONIN: Status: ACTIVE | Noted: 2019-08-16

## 2019-08-16 PROBLEM — R13.10 DYSPHAGIA: Status: ACTIVE | Noted: 2019-08-16

## 2019-08-16 PROBLEM — J96.01 ACUTE RESPIRATORY FAILURE WITH HYPOXIA (HCC): Status: ACTIVE | Noted: 2019-08-16

## 2019-08-16 PROBLEM — R79.89 ELEVATED TROPONIN: Status: ACTIVE | Noted: 2019-08-16

## 2019-08-16 LAB
ALBUMIN SERPL BCP-MCNC: 4 G/DL (ref 3.5–5)
ALP SERPL-CCNC: 68 U/L (ref 46–116)
ALT SERPL W P-5'-P-CCNC: 20 U/L (ref 12–78)
ANION GAP SERPL CALCULATED.3IONS-SCNC: 8 MMOL/L (ref 4–13)
ANION GAP SERPL CALCULATED.3IONS-SCNC: 8 MMOL/L (ref 4–13)
APTT PPP: 173 SECONDS (ref 23–37)
APTT PPP: 75 SECONDS (ref 23–37)
AST SERPL W P-5'-P-CCNC: 37 U/L (ref 5–45)
ATRIAL RATE: 80 BPM
BASOPHILS # BLD AUTO: 0.07 THOUSANDS/ΜL (ref 0–0.1)
BASOPHILS NFR BLD AUTO: 1 % (ref 0–1)
BILIRUB SERPL-MCNC: 0.54 MG/DL (ref 0.2–1)
BUN SERPL-MCNC: 33 MG/DL (ref 5–25)
BUN SERPL-MCNC: 33 MG/DL (ref 5–25)
CALCIUM SERPL-MCNC: 9.1 MG/DL (ref 8.3–10.1)
CALCIUM SERPL-MCNC: 9.5 MG/DL (ref 8.3–10.1)
CHLORIDE SERPL-SCNC: 102 MMOL/L (ref 100–108)
CHLORIDE SERPL-SCNC: 103 MMOL/L (ref 100–108)
CHOLEST SERPL-MCNC: 204 MG/DL (ref 50–200)
CO2 SERPL-SCNC: 26 MMOL/L (ref 21–32)
CO2 SERPL-SCNC: 27 MMOL/L (ref 21–32)
CREAT SERPL-MCNC: 1.28 MG/DL (ref 0.6–1.3)
CREAT SERPL-MCNC: 1.35 MG/DL (ref 0.6–1.3)
EOSINOPHIL # BLD AUTO: 0.21 THOUSAND/ΜL (ref 0–0.61)
EOSINOPHIL NFR BLD AUTO: 2 % (ref 0–6)
ERYTHROCYTE [DISTWIDTH] IN BLOOD BY AUTOMATED COUNT: 13.5 % (ref 11.6–15.1)
ERYTHROCYTE [DISTWIDTH] IN BLOOD BY AUTOMATED COUNT: 13.6 % (ref 11.6–15.1)
ERYTHROCYTE [DISTWIDTH] IN BLOOD BY AUTOMATED COUNT: 13.7 % (ref 11.6–15.1)
EST. AVERAGE GLUCOSE BLD GHB EST-MCNC: 97 MG/DL
GFR SERPL CREATININE-BSD FRML MDRD: 36 ML/MIN/1.73SQ M
GFR SERPL CREATININE-BSD FRML MDRD: 38 ML/MIN/1.73SQ M
GLUCOSE SERPL-MCNC: 115 MG/DL (ref 65–140)
GLUCOSE SERPL-MCNC: 135 MG/DL (ref 65–140)
GLUCOSE SERPL-MCNC: 142 MG/DL (ref 65–140)
GLUCOSE SERPL-MCNC: 147 MG/DL (ref 65–140)
GLUCOSE SERPL-MCNC: 158 MG/DL (ref 65–140)
GLUCOSE SERPL-MCNC: 167 MG/DL (ref 65–140)
HBA1C MFR BLD: 5 % (ref 4.2–6.3)
HCT VFR BLD AUTO: 31.7 % (ref 34.8–46.1)
HCT VFR BLD AUTO: 32.5 % (ref 34.8–46.1)
HCT VFR BLD AUTO: 36.3 % (ref 34.8–46.1)
HDLC SERPL-MCNC: 56 MG/DL (ref 40–60)
HGB BLD-MCNC: 10.1 G/DL (ref 11.5–15.4)
HGB BLD-MCNC: 10.2 G/DL (ref 11.5–15.4)
HGB BLD-MCNC: 11.5 G/DL (ref 11.5–15.4)
IMM GRANULOCYTES # BLD AUTO: 0.03 THOUSAND/UL (ref 0–0.2)
IMM GRANULOCYTES NFR BLD AUTO: 0 % (ref 0–2)
INR PPP: 1.15 (ref 0.84–1.19)
LDLC SERPL CALC-MCNC: 128 MG/DL (ref 0–100)
LYMPHOCYTES # BLD AUTO: 2.83 THOUSANDS/ΜL (ref 0.6–4.47)
LYMPHOCYTES NFR BLD AUTO: 28 % (ref 14–44)
MAGNESIUM SERPL-MCNC: 2.1 MG/DL (ref 1.6–2.6)
MCH RBC QN AUTO: 32.1 PG (ref 26.8–34.3)
MCH RBC QN AUTO: 32.2 PG (ref 26.8–34.3)
MCH RBC QN AUTO: 32.4 PG (ref 26.8–34.3)
MCHC RBC AUTO-ENTMCNC: 31.4 G/DL (ref 31.4–37.4)
MCHC RBC AUTO-ENTMCNC: 31.7 G/DL (ref 31.4–37.4)
MCHC RBC AUTO-ENTMCNC: 31.9 G/DL (ref 31.4–37.4)
MCV RBC AUTO: 101 FL (ref 82–98)
MCV RBC AUTO: 102 FL (ref 82–98)
MCV RBC AUTO: 102 FL (ref 82–98)
MONOCYTES # BLD AUTO: 0.54 THOUSAND/ΜL (ref 0.17–1.22)
MONOCYTES NFR BLD AUTO: 5 % (ref 4–12)
NEUTROPHILS # BLD AUTO: 6.45 THOUSANDS/ΜL (ref 1.85–7.62)
NEUTS SEG NFR BLD AUTO: 64 % (ref 43–75)
NONHDLC SERPL-MCNC: 148 MG/DL
NRBC BLD AUTO-RTO: 0 /100 WBCS
NT-PROBNP SERPL-MCNC: ABNORMAL PG/ML
P AXIS: 62 DEGREES
PLATELET # BLD AUTO: 170 THOUSANDS/UL (ref 149–390)
PLATELET # BLD AUTO: 172 THOUSANDS/UL (ref 149–390)
PLATELET # BLD AUTO: 196 THOUSANDS/UL (ref 149–390)
PMV BLD AUTO: 9.4 FL (ref 8.9–12.7)
PMV BLD AUTO: 9.4 FL (ref 8.9–12.7)
PMV BLD AUTO: 9.5 FL (ref 8.9–12.7)
POTASSIUM SERPL-SCNC: 4.2 MMOL/L (ref 3.5–5.3)
POTASSIUM SERPL-SCNC: 5 MMOL/L (ref 3.5–5.3)
PR INTERVAL: 182 MS
PROT SERPL-MCNC: 8.1 G/DL (ref 6.4–8.2)
PROTHROMBIN TIME: 14.3 SECONDS (ref 11.6–14.5)
QRS AXIS: -17 DEGREES
QRSD INTERVAL: 96 MS
QT INTERVAL: 406 MS
QTC INTERVAL: 468 MS
RBC # BLD AUTO: 3.14 MILLION/UL (ref 3.81–5.12)
RBC # BLD AUTO: 3.18 MILLION/UL (ref 3.81–5.12)
RBC # BLD AUTO: 3.55 MILLION/UL (ref 3.81–5.12)
SODIUM SERPL-SCNC: 137 MMOL/L (ref 136–145)
SODIUM SERPL-SCNC: 137 MMOL/L (ref 136–145)
T WAVE AXIS: 70 DEGREES
TRIGL SERPL-MCNC: 99 MG/DL
TROPONIN I SERPL-MCNC: 0.99 NG/ML
TROPONIN I SERPL-MCNC: 1.22 NG/ML
TROPONIN I SERPL-MCNC: 1.24 NG/ML
VENTRICULAR RATE: 80 BPM
WBC # BLD AUTO: 10.13 THOUSAND/UL (ref 4.31–10.16)
WBC # BLD AUTO: 8.05 THOUSAND/UL (ref 4.31–10.16)
WBC # BLD AUTO: 8.16 THOUSAND/UL (ref 4.31–10.16)

## 2019-08-16 PROCEDURE — 82948 REAGENT STRIP/BLOOD GLUCOSE: CPT

## 2019-08-16 PROCEDURE — 84484 ASSAY OF TROPONIN QUANT: CPT | Performed by: INTERNAL MEDICINE

## 2019-08-16 PROCEDURE — 99232 SBSQ HOSP IP/OBS MODERATE 35: CPT | Performed by: INTERNAL MEDICINE

## 2019-08-16 PROCEDURE — 80061 LIPID PANEL: CPT | Performed by: INTERNAL MEDICINE

## 2019-08-16 PROCEDURE — 36415 COLL VENOUS BLD VENIPUNCTURE: CPT | Performed by: EMERGENCY MEDICINE

## 2019-08-16 PROCEDURE — 94760 N-INVAS EAR/PLS OXIMETRY 1: CPT

## 2019-08-16 PROCEDURE — 93005 ELECTROCARDIOGRAM TRACING: CPT

## 2019-08-16 PROCEDURE — 83036 HEMOGLOBIN GLYCOSYLATED A1C: CPT | Performed by: INTERNAL MEDICINE

## 2019-08-16 PROCEDURE — 96375 TX/PRO/DX INJ NEW DRUG ADDON: CPT

## 2019-08-16 PROCEDURE — 85027 COMPLETE CBC AUTOMATED: CPT | Performed by: EMERGENCY MEDICINE

## 2019-08-16 PROCEDURE — 93306 TTE W/DOPPLER COMPLETE: CPT

## 2019-08-16 PROCEDURE — 80053 COMPREHEN METABOLIC PANEL: CPT | Performed by: EMERGENCY MEDICINE

## 2019-08-16 PROCEDURE — 71045 X-RAY EXAM CHEST 1 VIEW: CPT

## 2019-08-16 PROCEDURE — 85025 COMPLETE CBC W/AUTO DIFF WBC: CPT | Performed by: EMERGENCY MEDICINE

## 2019-08-16 PROCEDURE — 99223 1ST HOSP IP/OBS HIGH 75: CPT | Performed by: INTERNAL MEDICINE

## 2019-08-16 PROCEDURE — G8997 SWALLOW GOAL STATUS: HCPCS

## 2019-08-16 PROCEDURE — 93306 TTE W/DOPPLER COMPLETE: CPT | Performed by: INTERNAL MEDICINE

## 2019-08-16 PROCEDURE — 83880 ASSAY OF NATRIURETIC PEPTIDE: CPT | Performed by: EMERGENCY MEDICINE

## 2019-08-16 PROCEDURE — 96376 TX/PRO/DX INJ SAME DRUG ADON: CPT

## 2019-08-16 PROCEDURE — 85610 PROTHROMBIN TIME: CPT | Performed by: EMERGENCY MEDICINE

## 2019-08-16 PROCEDURE — 92610 EVALUATE SWALLOWING FUNCTION: CPT

## 2019-08-16 PROCEDURE — 80048 BASIC METABOLIC PNL TOTAL CA: CPT | Performed by: INTERNAL MEDICINE

## 2019-08-16 PROCEDURE — 93010 ELECTROCARDIOGRAM REPORT: CPT | Performed by: INTERNAL MEDICINE

## 2019-08-16 PROCEDURE — 85027 COMPLETE CBC AUTOMATED: CPT | Performed by: INTERNAL MEDICINE

## 2019-08-16 PROCEDURE — 96365 THER/PROPH/DIAG IV INF INIT: CPT

## 2019-08-16 PROCEDURE — 83735 ASSAY OF MAGNESIUM: CPT | Performed by: INTERNAL MEDICINE

## 2019-08-16 PROCEDURE — 85730 THROMBOPLASTIN TIME PARTIAL: CPT | Performed by: EMERGENCY MEDICINE

## 2019-08-16 PROCEDURE — 99291 CRITICAL CARE FIRST HOUR: CPT

## 2019-08-16 PROCEDURE — 99285 EMERGENCY DEPT VISIT HI MDM: CPT | Performed by: EMERGENCY MEDICINE

## 2019-08-16 PROCEDURE — G8996 SWALLOW CURRENT STATUS: HCPCS

## 2019-08-16 PROCEDURE — 94660 CPAP INITIATION&MGMT: CPT

## 2019-08-16 PROCEDURE — 85730 THROMBOPLASTIN TIME PARTIAL: CPT | Performed by: INTERNAL MEDICINE

## 2019-08-16 PROCEDURE — 84484 ASSAY OF TROPONIN QUANT: CPT | Performed by: EMERGENCY MEDICINE

## 2019-08-16 RX ORDER — NITROGLYCERIN 0.4 MG/1
0.8 TABLET SUBLINGUAL ONCE
Status: COMPLETED | OUTPATIENT
Start: 2019-08-16 | End: 2019-08-16

## 2019-08-16 RX ORDER — NYSTATIN 100000 [USP'U]/G
1 POWDER TOPICAL 3 TIMES DAILY
Status: DISCONTINUED | OUTPATIENT
Start: 2019-08-16 | End: 2019-08-20 | Stop reason: HOSPADM

## 2019-08-16 RX ORDER — FUROSEMIDE 10 MG/ML
80 INJECTION INTRAMUSCULAR; INTRAVENOUS 2 TIMES DAILY
Status: DISCONTINUED | OUTPATIENT
Start: 2019-08-16 | End: 2019-08-18

## 2019-08-16 RX ORDER — SERTRALINE HYDROCHLORIDE 25 MG/1
25 TABLET, FILM COATED ORAL
Status: DISCONTINUED | OUTPATIENT
Start: 2019-08-16 | End: 2019-08-20 | Stop reason: HOSPADM

## 2019-08-16 RX ORDER — NITROGLYCERIN 20 MG/100ML
100 INJECTION INTRAVENOUS
Status: DISCONTINUED | OUTPATIENT
Start: 2019-08-16 | End: 2019-08-16

## 2019-08-16 RX ORDER — HEPARIN SODIUM 1000 [USP'U]/ML
4000 INJECTION, SOLUTION INTRAVENOUS; SUBCUTANEOUS ONCE
Status: COMPLETED | OUTPATIENT
Start: 2019-08-16 | End: 2019-08-16

## 2019-08-16 RX ORDER — HEPARIN SODIUM 1000 [USP'U]/ML
2000 INJECTION, SOLUTION INTRAVENOUS; SUBCUTANEOUS AS NEEDED
Status: DISCONTINUED | OUTPATIENT
Start: 2019-08-16 | End: 2019-08-16

## 2019-08-16 RX ORDER — DOCUSATE SODIUM 100 MG/1
100 CAPSULE, LIQUID FILLED ORAL DAILY
Status: DISCONTINUED | OUTPATIENT
Start: 2019-08-16 | End: 2019-08-20 | Stop reason: HOSPADM

## 2019-08-16 RX ORDER — AMLODIPINE BESYLATE 5 MG/1
5 TABLET ORAL EVERY MORNING
Status: DISCONTINUED | OUTPATIENT
Start: 2019-08-16 | End: 2019-08-18

## 2019-08-16 RX ORDER — HEPARIN SODIUM 10000 [USP'U]/100ML
3-20 INJECTION, SOLUTION INTRAVENOUS
Status: DISCONTINUED | OUTPATIENT
Start: 2019-08-16 | End: 2019-08-16

## 2019-08-16 RX ORDER — GABAPENTIN 300 MG/1
300 CAPSULE ORAL 2 TIMES DAILY
Status: DISCONTINUED | OUTPATIENT
Start: 2019-08-16 | End: 2019-08-16

## 2019-08-16 RX ORDER — NITROGLYCERIN 0.4 MG/1
0.4 TABLET SUBLINGUAL
Status: DISCONTINUED | OUTPATIENT
Start: 2019-08-16 | End: 2019-08-20 | Stop reason: HOSPADM

## 2019-08-16 RX ORDER — ASPIRIN 81 MG/1
324 TABLET, CHEWABLE ORAL ONCE
Status: COMPLETED | OUTPATIENT
Start: 2019-08-16 | End: 2019-08-16

## 2019-08-16 RX ORDER — FUROSEMIDE 10 MG/ML
40 INJECTION INTRAMUSCULAR; INTRAVENOUS 2 TIMES DAILY
Status: DISCONTINUED | OUTPATIENT
Start: 2019-08-16 | End: 2019-08-16

## 2019-08-16 RX ORDER — METHOCARBAMOL 500 MG/1
500 TABLET, FILM COATED ORAL 3 TIMES DAILY PRN
Status: DISCONTINUED | OUTPATIENT
Start: 2019-08-16 | End: 2019-08-20 | Stop reason: HOSPADM

## 2019-08-16 RX ORDER — ALBUTEROL SULFATE 90 UG/1
2 AEROSOL, METERED RESPIRATORY (INHALATION) EVERY 4 HOURS PRN
Status: DISCONTINUED | OUTPATIENT
Start: 2019-08-16 | End: 2019-08-20 | Stop reason: HOSPADM

## 2019-08-16 RX ORDER — LEVOTHYROXINE SODIUM 112 UG/1
112 TABLET ORAL
Status: DISCONTINUED | OUTPATIENT
Start: 2019-08-16 | End: 2019-08-20 | Stop reason: HOSPADM

## 2019-08-16 RX ORDER — HYDROXYCHLOROQUINE SULFATE 200 MG/1
200 TABLET, FILM COATED ORAL DAILY
Status: DISCONTINUED | OUTPATIENT
Start: 2019-08-16 | End: 2019-08-20 | Stop reason: HOSPADM

## 2019-08-16 RX ORDER — FUROSEMIDE 10 MG/ML
40 INJECTION INTRAMUSCULAR; INTRAVENOUS ONCE
Status: COMPLETED | OUTPATIENT
Start: 2019-08-16 | End: 2019-08-16

## 2019-08-16 RX ORDER — GABAPENTIN 300 MG/1
600 CAPSULE ORAL 3 TIMES DAILY
Status: DISCONTINUED | OUTPATIENT
Start: 2019-08-16 | End: 2019-08-20 | Stop reason: HOSPADM

## 2019-08-16 RX ORDER — FUROSEMIDE 10 MG/ML
40 INJECTION INTRAMUSCULAR; INTRAVENOUS ONCE
Status: DISCONTINUED | OUTPATIENT
Start: 2019-08-16 | End: 2019-08-16

## 2019-08-16 RX ORDER — B-COMPLEX WITH VITAMIN C
1 TABLET ORAL 2 TIMES DAILY WITH MEALS
Status: DISCONTINUED | OUTPATIENT
Start: 2019-08-16 | End: 2019-08-20 | Stop reason: HOSPADM

## 2019-08-16 RX ORDER — FUROSEMIDE 10 MG/ML
20 INJECTION INTRAMUSCULAR; INTRAVENOUS ONCE
Status: DISCONTINUED | OUTPATIENT
Start: 2019-08-16 | End: 2019-08-16

## 2019-08-16 RX ORDER — NITROGLYCERIN 20 MG/100ML
INJECTION INTRAVENOUS
Status: COMPLETED
Start: 2019-08-16 | End: 2019-08-16

## 2019-08-16 RX ORDER — CHLORAL HYDRATE 500 MG
1000 CAPSULE ORAL DAILY
Status: DISCONTINUED | OUTPATIENT
Start: 2019-08-16 | End: 2019-08-20 | Stop reason: HOSPADM

## 2019-08-16 RX ORDER — ACETAMINOPHEN 325 MG/1
650 TABLET ORAL EVERY 6 HOURS PRN
Status: DISCONTINUED | OUTPATIENT
Start: 2019-08-16 | End: 2019-08-20 | Stop reason: HOSPADM

## 2019-08-16 RX ORDER — NITROGLYCERIN 20 MG/100ML
5-200 INJECTION INTRAVENOUS
Status: DISCONTINUED | OUTPATIENT
Start: 2019-08-16 | End: 2019-08-17

## 2019-08-16 RX ORDER — CLOPIDOGREL BISULFATE 75 MG/1
75 TABLET ORAL DAILY
COMMUNITY
End: 2019-10-10 | Stop reason: HOSPADM

## 2019-08-16 RX ORDER — HEPARIN SODIUM 1000 [USP'U]/ML
4000 INJECTION, SOLUTION INTRAVENOUS; SUBCUTANEOUS AS NEEDED
Status: DISCONTINUED | OUTPATIENT
Start: 2019-08-16 | End: 2019-08-16

## 2019-08-16 RX ADMIN — GABAPENTIN 300 MG: 300 CAPSULE ORAL at 13:45

## 2019-08-16 RX ADMIN — DOCUSATE SODIUM 100 MG: 100 CAPSULE, LIQUID FILLED ORAL at 09:58

## 2019-08-16 RX ADMIN — CALCIUM CARBONATE-VITAMIN D TAB 500 MG-200 UNIT 1 TABLET: 500-200 TAB at 09:58

## 2019-08-16 RX ADMIN — FUROSEMIDE 40 MG: 10 INJECTION, SOLUTION INTRAMUSCULAR; INTRAVENOUS at 03:07

## 2019-08-16 RX ADMIN — Medication 1 TABLET: at 09:58

## 2019-08-16 RX ADMIN — NITROGLYCERIN 100 MCG/MIN: 20 INJECTION INTRAVENOUS at 00:50

## 2019-08-16 RX ADMIN — INSULIN LISPRO 1 UNITS: 100 INJECTION, SOLUTION INTRAVENOUS; SUBCUTANEOUS at 13:29

## 2019-08-16 RX ADMIN — AMLODIPINE BESYLATE 5 MG: 5 TABLET ORAL at 09:58

## 2019-08-16 RX ADMIN — CALCIUM CARBONATE-VITAMIN D TAB 500 MG-200 UNIT 1 TABLET: 500-200 TAB at 15:40

## 2019-08-16 RX ADMIN — NYSTATIN 1 APPLICATION: 100000 POWDER TOPICAL at 21:12

## 2019-08-16 RX ADMIN — GABAPENTIN 600 MG: 300 CAPSULE ORAL at 20:01

## 2019-08-16 RX ADMIN — NITROGLYCERIN 0.8 MG: 0.4 TABLET SUBLINGUAL at 00:52

## 2019-08-16 RX ADMIN — Medication 1000 MG: at 09:58

## 2019-08-16 RX ADMIN — NITROGLYCERIN 100 MCG/MIN: 20 INJECTION INTRAVENOUS at 01:01

## 2019-08-16 RX ADMIN — ACETAMINOPHEN 650 MG: 325 TABLET ORAL at 10:03

## 2019-08-16 RX ADMIN — METOPROLOL TARTRATE 25 MG: 25 TABLET, FILM COATED ORAL at 21:13

## 2019-08-16 RX ADMIN — ASPIRIN 81 MG 324 MG: 81 TABLET ORAL at 01:51

## 2019-08-16 RX ADMIN — ACETAMINOPHEN 650 MG: 325 TABLET ORAL at 15:41

## 2019-08-16 RX ADMIN — ALBUTEROL SULFATE 2 PUFF: 90 AEROSOL, METERED RESPIRATORY (INHALATION) at 15:41

## 2019-08-16 RX ADMIN — FUROSEMIDE 40 MG: 10 INJECTION, SOLUTION INTRAMUSCULAR; INTRAVENOUS at 09:59

## 2019-08-16 RX ADMIN — METOPROLOL TARTRATE 25 MG: 25 TABLET, FILM COATED ORAL at 09:58

## 2019-08-16 RX ADMIN — HEPARIN SODIUM 4000 UNITS: 1000 INJECTION, SOLUTION INTRAVENOUS; SUBCUTANEOUS at 01:39

## 2019-08-16 RX ADMIN — HEPARIN SODIUM AND DEXTROSE 11.1 UNITS/KG/HR: 10000; 5 INJECTION INTRAVENOUS at 01:43

## 2019-08-16 RX ADMIN — METHOCARBAMOL 500 MG: 500 TABLET, FILM COATED ORAL at 10:03

## 2019-08-16 RX ADMIN — SERTRALINE HYDROCHLORIDE 25 MG: 25 TABLET ORAL at 21:13

## 2019-08-16 RX ADMIN — NYSTATIN 1 APPLICATION: 100000 POWDER TOPICAL at 14:40

## 2019-08-16 RX ADMIN — TICAGRELOR 180 MG: 90 TABLET ORAL at 01:52

## 2019-08-16 RX ADMIN — FUROSEMIDE 80 MG: 10 INJECTION, SOLUTION INTRAMUSCULAR; INTRAVENOUS at 17:59

## 2019-08-16 RX ADMIN — HYDROXYCHLOROQUINE SULFATE 200 MG: 200 TABLET, FILM COATED ORAL at 09:59

## 2019-08-16 RX ADMIN — ACETAMINOPHEN 650 MG: 325 TABLET ORAL at 21:16

## 2019-08-16 NOTE — ASSESSMENT & PLAN NOTE
Wt Readings from Last 3 Encounters:   08/16/19 101 kg (223 lb 1 7 oz)   06/25/19 88 9 kg (196 lb)   05/01/19 88 9 kg (196 lb)     Acute on chronic diastolic CHF; reviewed records and patient known to Dr Viji Serrano  Continue IV furosemide until seen by cardiology    Daughter reports baseline weight is maximum 200 lb

## 2019-08-16 NOTE — H&P
H&P- Milaids Perks 1934, 80 y o  female MRN: [id number]    Unit/Bed#: [facility] 507-01 Encounter: [id number]    Primary Care Provider: Gina Velez DO   Date and time admitted to hospital: 8/16/2019 12:33 AM        * Acute respiratory failure with hypoxia ([facility] [address] )  Assessment & Plan  · Initially presenting with respiratory distress requiring BiPAP therapy, since improved and now on 3L NC supplemental oxygen  Likely in setting of acute heart failure  · Treatment of acute heart failure and potential NSTEMI as below  · Incentive spirometry, pulmonary toileting  · Provide supplemental oxygen as needed to maintain SaO2 greater than 88%  · ADDENDUM:  On arrival to floor patient with respiratory distress  BiPAP initiated on reassessment patient more comfortable  Will update level of care to step-down 2, trial off BiPAP in 2-3 hours  May need to consider re-initiation of nitroglycerin GTT if no improvement    Acute heart failure [facility])  Assessment & Plan  Wt Readings from Last 3 Encounters:   08/16/19 90 7 kg (200 lb)   06/25/19 88 9 kg (196 lb)   05/01/19 88 9 kg (196 lb)     · Initially presenting with respiratory distress  CXR awaiting final read but appears pulmonary vascular congestion, also with elevated NT-BNP pro  · Improved with a BiPAP and nitroglycerin GTT; nitroglycerin GTT since ceased due to low blood pressures in ED  · Will provide 40 mg IV Lasix now then 40 mg IV b i d    · Check echocardiogram; last echocardiogram 09/2016 with EF 79%, grade 1 diastolic dysfunction  · Cardiology consultation to be requested  · Daily weights, I/Os  · Monitor creatinine, electrolytes        Elevated troponin  Assessment & Plan  · Troponin 0 99, EKG without apparent ischemic change  · Unclear at this time if NSTEMI type 1 or NSTEMI type 2 in setting of acute heart failure  · Was initiated on heparin GTT, provided ASA and Brilinta load in ED  · Continue heparin GTT for now  · Continue to trend troponin    Acute kidney injury [facility])  Assessment & Plan  · POA, baseline creatinine approximately 1  · Diuresis as above, will monitor creatinine daily  · Hold nephrotoxins, avoid hypotension    Rheumatoid arthritis involving multiple sites [facility])  Assessment & Plan  · Stable and will continue home medications    Type 2 diabetes mellitus with diabetic polyneuropathy [facility])  Assessment & Plan  Lab Results   Component Value Date    HGBA1C 4 8 04/09/2019       No results for input(s): POCGLU in the last 72 hours  Blood Sugar Average: Last 72 hrs:  ·  listed past medical history, however appears controlled off medications  · Check A1c in a m  SSI with Accu-Cheks    Hypothyroidism  Assessment & Plan  · Continue levothyroxine    Hypertension  Assessment & Plan  · Continue amlodipine and metoprolol, holding losartan in setting of acute kidney injury  · Avoid hypotension and monitor blood pressure per protocol      VTE Prophylaxis: Heparin Drip  / sequential compression device   Code Status: ADDENDUM: DNR/DNI  Patient initially wished to be full code, however prior documentation on living will provided by patient indicated she would wish to have been DNR/DNI  I clarified with patient at bedside, she has indicated she would wish to follow directions on living will  POLST: POLST form is not discussed and not completed at this time  Anticipated Length of Stay:  Patient will be admitted on an Inpatient basis with an anticipated length of stay of  Greater than 2 midnights  Justification for Hospital Stay: Please see detailed plans noted above  Chief Complaint:     Shortness of breath  History of Present Illness:  Alona Siegel is a 80 y o  female who presents with shortness of breath  Has a past medical history significant for diastolic CHF, aortic stenosis S/P prior bioprosthetic AVR replacement, apparent type 2 DM, hypertension, hypothyroidism, rheumatoid arthritis    Reports she was in her normal state of health until approximately 2 days prior to admission when she developed worsening shortness of breath even at rest   This was associated with wheezing, and patient has been unable to lie flat since the onset of symptoms  She notes leg swelling, but does not feel this is worse than baseline  She denies any fevers/chills or known sick contacts, chest pain/pressure, coughing, abdominal pain/nausea/vomiting/diarrhea  On presentation to the ED, she was noted to be in significant respiratory distress requiring BiPAP therapy and IV nitroglycerin  Subsequent workup revealed a troponin of 0 99 and CXR with evidence of pulmonary vascular congestion for which she was initiated on heparin GTT, loaded with Brilinta and aspirin, and is to receive dose of IV Lasix  At the time of my evaluation, BiPAP had been weaned off, and patient in no respiratory distress on 3L NC, noting improvement in her shortness of breath      Review of Systems:    Constitutional:  Denies fever or chills   Eyes:  Denies change in visual acuity   HENT:  Denies nasal congestion or sore throat   Respiratory:  Denies cough but endorsed shortness of breath  Cardiovascular:  Denies chest pain but endorses chronic edema  GI:  Denies abdominal pain, nausea, vomiting, bloody stools or diarrhea   :  Denies dysuria   Musculoskeletal:  Denies back pain or joint pain   Integument:  Denies rash   Neurologic:  Denies headache, focal weakness or sensory changes   Endocrine:  Denies polyuria or polydipsia   Lymphatic:  Denies swollen glands   Psychiatric:  Denies depression or anxiety     Past Medical and Surgical History:   Past Medical History:   Diagnosis Date    Arthritis     [facility]     Cardiac disease     aortic valve transplant    [facility]congestive heart failure) ([facility])     Compression fracture of body of thoracic vertebra (HCC)     CVA (cerebral vascular accident) ([facility] [address] )     Diabetes mellitus [facility]     Disease of thyroid gland     Fibromyalgia, primary     Hyperlipidemia     Hypertension     Neuropathy     Pressure injury of skin     Renal disorder     kidney stent    Stroke [facility])      Past Surgical History:   Procedure Laterality Date    AORTIC VALVE SURGERY      BREAST SURGERY      lumpectomy for breast cancer    CATARACT EXTRACTION      CHOLECYSTECTOMY      HYSTERECTOMY  1978    KIDNEY SURGERY      stent placed for kidney    OTHER SURGICAL HISTORY      abdominal aneurysm surgery    [place] ARTHRODESIS POSTERIOR ATLAS-AXIS C1-C2 N/A 5/1/2019    Procedure: C1-C2 lateral mass fixation fusion with possible sublaminar cables;  Surgeon: Mp Whitehead MD;  [facility];  Service: Neurosurgery    REPLACEMENT TOTAL KNEE      left        Meds/Allergies:  Medications Prior to Admission   Medication    acetaminophen (TYLENOL) 325 mg tablet    albuterol (PROVENTIL HFA,VENTOLIN HFA) 90 mcg/act inhaler    amLODIPine (NORVASC) 5 mg tablet    calcium carbonate-vitamin D (OSCAL-D) 500 mg-200 units per tablet    docusate sodium (COLACE) 100 mg capsule    furosemide (LASIX) 20 mg tablet    gabapentin (NEURONTIN) 300 mg capsule    HYDROcodone-acetaminophen (NORCO) 5-325 mg per tablet    hydroxychloroquine (PLAQUENIL) 200 mg tablet    Levothyroxine Sodium 112 MCG CAPS    lidocaine (LIDODERM) 5 %    losartan (COZAAR) 100 MG tablet    methocarbamol (ROBAXIN) 500 mg tablet    metoprolol tartrate (LOPRESSOR) 25 mg tablet    Multiple Vitamins-Calcium (MULTI-DAY/CALCIUM/EXTRA IRON PO)    Multiple Vitamins-Minerals (CENTRUM ADULTS PO)    nitroglycerin (NITROSTAT) 0 4 mg SL tablet    nystatin (MYCOSTATIN) powder    Omega-3 Fatty Acids (FISH OIL) 1,000 mg    sertraline (ZOLOFT) 25 mg tablet       Allergies: Allergies   Allergen Reactions    Duloxetine Hcl Other (See Comments) and Hypertension    Duloxetine Hcl      Cymbalta;  Hemorrhagic stroke listed as reaction    Escitalopram      Other reaction(s): Urinary Retention    Pregabalin      Other reaction(s): Hypertension    Statins Myalgia    Tramadol      Other reaction(s): Hypertension    Triprolidine-Pse Other (See Comments)    Anastrozole Abdominal Pain    Anastrozole     Antihistamines, Diphenhydramine-Type     Exemestane      Aromasin; Muscle pain & cramps    Lexapro [Escitalopram]      Urinary retention    Lyrica [Pregabalin]      hypertension    Metformin      diarrhea    Oxycodone     Statins      Muscle pain & cramps    Tramadol      hypertension    Triprolidine-Pseudoephedrine     Metformin Diarrhea     History:  Marital Status:      Substance Use History:   Social History     Substance and Sexual Activity   Alcohol Use Not Currently     Social History     Tobacco Use   Smoking Status Never Smoker   Smokeless Tobacco Never Used     Social History     Substance and Sexual Activity   Drug Use No       Family History:  Family History   Problem Relation Age of Onset    Heart disease Mother     Arthritis Mother     Diabetes Mother     Heart failure Father     Arthritis Father     Other Father         enlargement of prostate     Dementia Father        Physical Exam:     Vitals:   Blood Pressure: 142/65 (08/16/19 0215)  Pulse: 68 (08/16/19 0215)  Temperature: 99 7 °F (37 6 °C) (08/16/19 0059)  Temp Source: Axillary (08/16/19 0059)  Respirations: (!) 24 (08/16/19 0215)  Height: 4' 11" (149 9 cm) (08/16/19 0045)  Weight - Scale: 90 7 kg (200 lb) (08/16/19 0045)  SpO2: 95 % (08/16/19 0215)    Constitutional:  Well developed, obese, no acute distress, non-toxic appearance   Eyes:  PERRL, conjunctiva normal   HENT:  Atraumatic, external ears normal, nose normal, oropharynx moist, no pharyngeal exudates   Neck- normal range of motion, no tenderness, supple   Respiratory:  No respiratory distress, decreased breath sounds bilaterally with bilateral apical expiratory wheezing and bibasilar rales  Cardiovascular:  Normal rate, normal rhythm, no murmurs, no gallops, no rubs   GI:  Soft, nondistended, normal bowel sounds, nontender, no organomegaly, no mass, no rebound, no guarding   :  No costovertebral angle tenderness   Musculoskeletal:  Trace bilateral LE edema, no tenderness, no deformities  Back- no tenderness  Integument:  Well hydrated, no rash   Lymphatic:  No lymphadenopathy noted   Neurologic:  Alert &awake, communicative, CN 2-12 normal, normal motor function, normal sensory function, no focal deficits noted   Psychiatric:  Speech and behavior appropriate       Lab Results: I have personally reviewed pertinent reports  Results from last 7 days   Lab Units 08/16/19 0214 08/16/19  0053   WBC Thousand/uL 8 16 10 13   HEMOGLOBIN g/dL 10 2* 11 5   HEMATOCRIT % 32 5* 36 3   PLATELETS Thousands/uL 170 196   NEUTROS PCT %  --  64   LYMPHS PCT %  --  28   MONOS PCT %  --  5   EOS PCT %  --  2     Results from last 7 days   Lab Units 08/16/19  0053   POTASSIUM mmol/L 5 0   CHLORIDE mmol/L 103   CO2 mmol/L 26   BUN mg/dL 33*   CREATININE mg/dL 1 35*   CALCIUM mg/dL 9 5   ALK PHOS U/L 68   ALT U/L 20   AST U/L 37     Results from last 7 days   Lab Units 08/16/19  0214   INR  1 15       EKG: Normal sinus rhythm    Imaging: I have personally reviewed pertinent reports  CXR personally reviewed, appears evidence of bilateral pulmonary vascular congestion  ** Please Note: Dragon 360 Dictation voice to text software was used in the creation of this document   **

## 2019-08-16 NOTE — ASSESSMENT & PLAN NOTE
Wt Readings from Last 3 Encounters:   08/16/19 90 7 kg (200 lb)   06/25/19 88 9 kg (196 lb)   05/01/19 88 9 kg (196 lb)     · Initially presenting with respiratory distress  CXR awaiting final read but appears pulmonary vascular congestion, also with elevated NT-BNP pro  · Improved with a BiPAP and nitroglycerin GTT; nitroglycerin GTT since ceased due to low blood pressures in ED  · Will provide 40 mg IV Lasix now then 40 mg IV b i d    · Check echocardiogram; last echocardiogram 09/2016 with EF 61%, grade 1 diastolic dysfunction  · Cardiology consultation to be requested  · Daily weights, I/Os  · Monitor creatinine, electrolytes

## 2019-08-16 NOTE — ED ATTENDING ATTESTATION
Chidi Brooks DO, saw and evaluated the patient  I have discussed the patient with the resident/non-physician practitioner and agree with the resident's/non-physician practitioner's findings, Plan of Care, and MDM as documented in the resident's/non-physician practitioner's note, except where noted  All available labs and Radiology studies were reviewed  I was present for key portions of any procedure(s) performed by the resident/non-physician practitioner and I was immediately available to provide assistance  At this point I agree with the current assessment done in the Emergency Department  I have conducted an independent evaluation of this patient a history and physical is as follows:    59-year-old female with history of grade 1 diastolic heart failure presents for evaluation of dyspnea and increased work of breathing starting tonight symptoms constant, no alleviating or exacerbating factors denies any significant increase in leg swelling  Denies fevers, chills  Patient seen immediately upon arrival with increased respiratory rate and abdominal breathing  She has decreased breath sounds bilaterally and faint crackles in the bases bilaterally  She has multiple B lines in the anterior lung fields bilaterally right greater than left  She has slightly reduced ejection fraction based on bedside echocardiogram she has mild apical hypokinesis no pericardial effusion is noted the RV is not significantly dilated  Patient has some mild hypertension with a systolic pressure of 624  Impression:  Acute respiratory distress likely due to acute pulmonary edema from acute on chronic diastolic heart failure  Plan:  Nitroglycerin for preload reduction, BiPAP, cardiac evaluation, reassess after preload reduction is complete  May require Lasix patient will require admission for further evaluation and treatment          Critical Care Time  Procedures

## 2019-08-16 NOTE — PROGRESS NOTES
Patient and daughter noted having bilateral labial lesions and PCP had recommended surgical evaluation  Patient requesting gynecologic evaluation during hospitalization

## 2019-08-16 NOTE — SPEECH THERAPY NOTE
Speech-Language Pathology Bedside Swallow Evaluation    Patient Name: Ernst Momin    ZGXPB'[id number] Date: 8/16/2019     Problem List  Patient Active Problem List   Diagnosis    TIA (transient ischemic attack)    UTI (urinary tract infection)    Hypertension    Diabetes ([facility] [address] )    Hypothyroidism    HX: breast cancer    H/O: CVA (cerebrovascular accident)    Osteoporosis    Arthritis    Fibromyalgia    Bilateral malignant neoplasm of breast in female, estrogen receptor positive ([facility] [address] )    Closed nondisplaced fracture of second cervical vertebra (HCC)    Neck pain, acute    Type III fracture of odontoid process ([address] )    C6 cervical fracture ([address] )    Hypertension    Hypothyroidism    Fall    Ambulatory dysfunction    Acute pain    Renovascular hypertension    Acute heart failure (HCC)    History of CVA (cerebrovascular accident)    Type 2 diabetes mellitus with diabetic polyneuropathy ([facility])    Depression    Stage 3 chronic kidney disease ([facility])    Rheumatoid arthritis involving multiple sites ([address] )    Fibromyalgia    History of aortic valve replacement with bioprosthetic valve    Renal artery stenosis ([facility])    Parenchymal renal hypertension    Pain    Lesion of left lung    Trigger point    Dermatitis associated with moisture    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    Elevated troponin    Acute respiratory failure with hypoxia ([facility])    Acute kidney injury ([address] )    Dysphagia       Past Medical History  Past Medical History:   Diagnosis Date    Arthritis     Breast cancer ([address] )     Cardiac disease     aortic valve transplant    CHF (congestive heart failure) ([facility])     Compression fracture of body of thoracic vertebra ([facility])     CVA (cerebral vascular accident) ([address] )     Diabetes mellitus ([address] )     Disease of thyroid gland     Fibromyalgia, primary     Hyperlipidemia     Hypertension     Neuropathy     Pressure injury of skin     Renal disorder     kidney stent    Stroke [facility])        Past Surgical History  Past Surgical History:   Procedure Laterality Date    AORTIC VALVE SURGERY      BREAST SURGERY      lumpectomy for breast cancer    CATARACT EXTRACTION      CHOLECYSTECTOMY      HYSTERECTOMY  1978    KIDNEY SURGERY      stent placed for kidney    OTHER SURGICAL HISTORY      abdominal aneurysm surgery    [place] ARTHRODESIS POSTERIOR ATLAS-AXIS C1-C2 N/A 5/1/2019    Procedure: C1-C2 lateral mass fixation fusion with possible sublaminar cables;  Surgeon: Tony Stein MD;  [facility];  Service: Neurosurgery    REPLACEMENT TOTAL KNEE      left        Summary   Pt presented with functional appearing oral and pharyngeal stage swallowing skills with materials administered today  Pharyngeal swallow initiation felt to be timely, and laryngeal rise felt to be [place]  Pt/daughter reported pt has occasional coughing episodes when she eats (~1x per month), pt admitted she occasionally eats too fast and is often distracted (laughing, talking) while eating  Additionally, pt was noted to have some burping during evaluation and stated she sometimes feels "it gets blocked" pointing to the level of her sternum  Question component of esophageal dysphagia/occasional bottom up aspiration  Recommend continuing regular diet at this time, ST will f/u briefly for reinforcement of safe swallow strategies  Risk for Aspiration: suspect low, may be at increased risk for bottom up aspiration  Recommendations: regular diet and thin liquids     Recommended Form of Meds: whole with liquid     Aspiration precautions and compensatory swallowing strategies: upright posture, only feed when fully alert, slow rate of feeding, small bites/sips and alternating bites and sips      Current Medical Status per Dr Trina Amaya H&P 8/16/2019  Michelle Primus is a 80 y o  female who presents with shortness of breath    Has a past medical history significant for diastolic CHF, aortic stenosis S/P prior bioprosthetic AVR replacement, apparent type 2 DM, hypertension, hypothyroidism, rheumatoid arthritis  Reports she was in her normal state of health until approximately 2 days prior to admission when she developed worsening shortness of breath even at rest   This was associated with wheezing, and patient has been unable to lie flat since the onset of symptoms  She notes leg swelling, but does not feel this is worse than baseline  She denies any fevers/chills or known sick contacts, chest pain/pressure, coughing, abdominal pain/nausea/vomiting/diarrhea  On presentation to the ED, she was noted to be in significant respiratory distress requiring BiPAP therapy and IV nitroglycerin  Subsequent workup revealed a troponin of 0 99 and CXR with evidence of pulmonary vascular congestion for which she was initiated on heparin GTT, loaded with Brilinta and aspirin, and is to receive dose of IV Lasix  At the time of my evaluation, BiPAP had been weaned off, and patient in no respiratory distress on 3L NC, noting improvement in her shortness of breath  Per Dr Josefa Tucker progress note today 8/16/2019   Dysphagia  Assessment & Plan  Daughter reported increased coughing when swallowing  Given respiratory symptoms will have speech pathologist evaluate swallowing    Current Precautions:  none    Past medical history:  Please see H&P for details    Special Studies:  IMPRESSION:  Mild central vascular congestion in keeping with CHF  Probable small pleural effusions      Social/Education/Vocational Hx:  Pt lives with family      Swallow Information   Current Symptoms/Concerns: coughing with po, per pt/family    Current Diet: regular diet and thin liquids      Baseline Diet: regular diet and thin liquids      Baseline Assessment   Behavior/Cognition: alert    Speech/Language Status: able to participate in conversation and able to follow commands    Patient Positioning: upright in bed    Pain Status/Interventions/Response to Interventions:  No report of or nonverbal indications of pain  Swallow Mechanism Exam   Oral-motor structures and function are WNL for symmetry, strength, ROM & coordination  Pt has full dentures  Consistencies Assessed and Performance   Consistencies Administered: thin liquids, puree and regular solids  Materials administered included water, apple sauce, toast    Oral Stage: WFL  Mastication was adequate with the materials administered today  Bolus formation and transfer were functional with no significant oral residue noted  No overt s/s reduced oral control  Pharyngeal Stage: WFL  Swallow Mechanics: Swallowing initiation appeared prompt  Laryngeal rise was palpated and judged to be within functional limits  Occasional mild throat clearing and burping also noted, question component of esophageal dysfunction  No overt coughing or change in vocal quality  Esophageal Concerns: belching and description of GERD symptoms but no official diagnosis    Strategies and Efficacy: encouraged pt to eat slowly and take small bites/sips      Summary and Recommendations (see above)    Results Reviewed with: patient, RN and family     Consider referral to: possibly GI    Treatment Recommended: brief ST f/u    Frequency of treatment: 2-3x    Dysphagia Goals per SLP: pt will tolerate regular diet with thin liquids without s/s of aspiration x72hrs    Education: initiated  Pt and caregivers would benefit from/require continued education

## 2019-08-16 NOTE — RESPIRATORY THERAPY NOTE
Respiratory Care      08/16/19 0100   Respiratory Assessment   Assessment Type Assess only   General Appearance Awake   Respiratory Pattern Tachypneic   Chest Assessment Chest expansion symmetrical   Bilateral Breath Sounds Diminished   Resp Comments Pt in respiratory distress  SpO2 95% on NC  Tachypneic with accessory muscle use  BiPAP started for increased WOB

## 2019-08-16 NOTE — ED PROCEDURE NOTE
PROCEDURE  CriticalCare Time  Performed by: Shannan Singh MD  Authorized by: Shannan Singh MD     Critical care provider statement:     Critical care time (minutes):  31    Critical care time was exclusive of:  Separately billable procedures and treating other patients and teaching time    Critical care was necessary to treat or prevent imminent or life-threatening deterioration of the following conditions:  Cardiac failure    Critical care was time spent personally by me on the following activities:  Obtaining history from patient or surrogate, development of treatment plan with patient or surrogate, discussions with consultants, evaluation of patient's response to treatment, examination of patient, ordering and performing treatments and interventions, ordering and review of laboratory studies, ordering and review of radiographic studies, re-evaluation of patient's condition and review of old charts    I assumed direction of critical care for this patient from another provider in my specialty: yes Kt Campbell    Comments:      Patient's Tn and BNP are elevated;  Patient has NSTEMI; bipap initiated; NTG IV patient did not tolerate; patient did received NTG SL; plan to administer heparin IV and ticagrelor         Shannan Singh MD  08/16/19 5233

## 2019-08-16 NOTE — ASSESSMENT & PLAN NOTE
Lab Results   Component Value Date    HGBA1C 5 0 08/16/2019     Recent Labs     08/16/19  0558   POCGLU 147*     Diabetes mellitus continue sliding scale insulin

## 2019-08-16 NOTE — ASSESSMENT & PLAN NOTE
Elevated troponin consideration for NSTEMI    Continue heparin infusion until seen by cardiology    Results from last 7 days   Lab Units 08/16/19  0721 08/16/19  0439 08/16/19  0053   TROPONIN I ng/mL 1 22* 1 24* 0 99*

## 2019-08-16 NOTE — CONSULTS
Cardiology   Michael Quiroga 80 y o  female MRN: [id number]  Unit/Bed#: [facility] 507-01 Encounter: [id number]      Reason for Consult / Principal Problem: Acute exacerbation of CHF and NSTEMI    Physician Requesting Consult:  Lesley Barbosa DO    Cardiologist: Dr Zak Coleman        Assessment/Plan:    >> Acute exacerbation of diastolic CHF  Known history of diastolic CHF, takes Lasix 20mg PO QD at home  Presented to the ED with respiratory failure after worsening SOB at rest and orthopnea for 2 days  Is not on any home O2  Was in acute respiratory distress on initial presentation to the ED and on BiPAP, now being weaned down off O2, currently on 3L and breathing easier  Appears volume overloaded on exam  CXR showed pulmonary vascular congestion  Baseline weight is stated to be 20 lbs lower than reported in hospital weight of 223 lbs  Increased diuresis to Lasix 80mg BID to try and reduce fluid overload and reduce O2 use  Will continue to observe diuresis    >> Troponin elevation  Troponin on presentation was 0 99, peaked at 1 24 early this morning  No chest pain at any time  Pt does not have a history of CAD  Denies any episodes of exertional chest pain  Likely secondary to non-MI troponin elevation from acute exacerbation of CHF  Waiting on ECHO results    >> HLD  Not on home statin, recommend that she should be started on high dose statin  LDL was 128 on presentation  Has known DM type 2  Has known HTN  Presented with NSTEMI    >> HTN  On Losartan 100mg PO QD at home    >> DM type 2  Known history of DM type 2   On insulin, continued in hospital  A1c at this hospitalization is 5%      CC: acute respiratory distress    HPI: Michael Quiroga 80y o  year old female with cardiac history significant for diastolic CHF, aortic stenosis s/p bioprosthetic valve replacement, DM type 2, HTN, HLD who presented to the ED yesterday with 2 day history of worsening shortness of breath at rest, leg swelling and orthopnea   On presentation to the ED she required BiPAP and IV nitroglycerin  Labs from the ED showed troponin elevated to 0 99 which has since peaked at 1 24 overnight, and CXR showed pulmonary vascular congestion  She was started on heparin, loaded with brilinta and given ASA in the ED and is now on IV lasix 40 BID, amlodipine 5mg QD, metoprolol tartrate 25 PO BID, and she has nitro PRN  Since admission she has now been weaned down to 3L O2 and is breathing comfortably  She states she normally takes Plavix at home and ECHO has been ordered this morning, presently awaiting results  Pt follows with cardiology at [address] [place] [address] [address], Dr Ha Bernstein, she states she has had many previous stress tests and ECHOs and they have always been normal  Per chart, most recent ECHO in 2016 showed EF of 60%  Pt was seen by me this morning, she is evidently volume up and is complaining of shortness of breath (although improved from admission yesterday)  She states she feels much better than yesterday  She has b/l edema of the lower extremities and she does not believe the edema is worse than her baseline, however, her typical home weight is stated as 200 lbs which is 20 lbs lower than her currently stated in hospital weight, although not confirmed  She is resting easily now and states she is able to lay flatter than she has been able to the last few days and has been able to sleep well and eat      Denies chest pain, palpitations, orthopnea, PND, pedal edema, syncope, presyncope, diaphoresis, nausea/vomiting     Remainder of ROS done and negative    Telemetry: no events    Net fluid balance: -1414mL    Weight: 101kgs    EKG: normal sinus (from the ED)    ECHO: ordered this AM, currently being completed    CHEST X-RAY: pulmonary vascular congestion    Labs: Cr 1 28        Family History:   Family History   Problem Relation Age of Onset    Heart disease Mother     Arthritis Mother     Diabetes Mother     Heart failure Father     Arthritis Father     Other Father enlargement of prostate     Dementia Father      Historical Information   Past Medical History:   Diagnosis Date    Arthritis     Breast cancer [facility])     Cardiac disease     aortic valve transplant    [facility]     Compression fracture of body of thoracic vertebra ([facility])     CVA [facility]re[facility] accident[facility] [address] [facility]     [facility]     Disease of thyroid gland     Fibromyalgia, primary     Hyperlipidemia     Hypertension     Neuropathy     Pressure injury of skin     Renal disorder     kidney stent    Stroke [facility])      Past Surgical History:   Procedure Laterality Date    AORTIC VALVE SURGERY      BREAST SURGERY      lumpectomy for breast cancer    CATARACT EXTRACTION      CHOLECYSTECTOMY      HYSTERECTOMY  1978    KIDNEY SURGERY      stent placed for kidney    OTHER SURGICAL HISTORY      abdominal aneurysm surgery    [facility] ARTHRODESIS POSTERIOR ATLAS-AXIS C1-C2 N/A 5/1/2019    Procedure: C1-C2 lateral mass fixation fusion with possible sublaminar cables;  Surgeon: Keila Matt MD;  Location: [facility];  Service: Neurosurgery    REPLACEMENT TOTAL KNEE      left      Social History   Social History     Substance and Sexual Activity   Alcohol Use Not Currently     Social History     Substance and Sexual Activity   Drug Use No     Social History     Tobacco Use   Smoking Status Never Smoker   Smokeless Tobacco Never Used     Family History:   Family History   Problem Relation Age of Onset    Heart disease Mother     Arthritis Mother     Diabetes Mother     Heart failure Father     Arthritis Father     Other Father         enlargement of prostate     Dementia Father        Review of Systems:  Review of Systems   Constitutional: Negative for fever  HENT: Positive for congestion  Respiratory: Positive for cough, shortness of breath and wheezing  Cardiovascular: Positive for leg swelling  Negative for chest pain and palpitations     All other systems reviewed and are negative            Scheduled Meds:    Current Facility-Administered Medications:  acetaminophen 650 mg Oral Q6H PRN Benedetta Flack, DO    albuterol 2 puff Inhalation Q4H PRN Benedetta Flack, DO    amLODIPine 5 mg Oral QAM Benedetta Flack, DO    calcium carbonate-vitamin D 1 tablet Oral BID With Meals Benedetta Flack, DO    docusate sodium 100 mg Oral Daily Benedetta Flack, DO    fish oil 1,000 mg Oral Daily Benedetta Flack, DO    furosemide 80 mg Intravenous BID Jake Alan MD    gabapentin 300 mg Oral BID Ancelmo Liemariam, DO    heparin (porcine) 3-20 Units/kg/hr (Order-Specific) Intravenous Titrated Benedetta Flack, DO Last Rate: 11 1 Units/kg/hr (08/16/19 0143)   heparin (porcine) 2,000 Units Intravenous PRN Benedetta Flack, DO    heparin (porcine) 4,000 Units Intravenous PRN Benedetta Flack, DO    hydroxychloroquine 200 mg Oral Daily Benedetta Flack, DO    insulin lispro 1-6 Units Subcutaneous 4x Daily (AC & HS) Ancelmo Lien, DO    levothyroxine 112 mcg Oral Early Morning Benedetta Flack, DO    methocarbamol 500 mg Oral TID PRN Benedetta Flack, DO    metoprolol tartrate 25 mg Oral BID Benedetta Flack, DO    multivitamin-minerals 1 tablet Oral Daily Benedetta Flack, DO    nitroglycerin 0 4 mg Sublingual Q5 Min PRN Benedetta Flack, DO    nitroGLYcerin 5-200 mcg/min Intravenous Titrated Benedetta Flack, DO Last Rate: Stopped (08/16/19 0103)   nystatin 1 application Topical TID Ancelmo Lien, DO    sertraline 25 mg Oral HS Benedetta Flack, DO      Continuous Infusions:    heparin (porcine) 3-20 Units/kg/hr (Order-Specific) Last Rate: 11 1 Units/kg/hr (08/16/19 0143)   nitroGLYcerin 5-200 mcg/min Last Rate: Stopped (08/16/19 0103)     PRN Meds:   acetaminophen    albuterol    heparin (porcine)    heparin (porcine)    methocarbamol    nitroglycerin  all current active meds have been reviewed, current meds:   Current Facility-Administered Medications   Medication Dose Route Frequency    acetaminophen (TYLENOL) tablet 650 mg  650 mg Oral Q6H PRN    albuterol (PROVENTIL HFA,VENTOLIN HFA) inhaler 2 puff  2 puff Inhalation Q4H PRN    amLODIPine (NORVASC) tablet 5 mg  5 mg Oral QAM    calcium carbonate-vitamin D (OSCAL-D) 500 mg-200 units per tablet 1 tablet  1 tablet Oral BID With Meals    docusate sodium (COLACE) capsule 100 mg  100 mg Oral Daily    fish oil capsule 1,000 mg  1,000 mg Oral Daily    furosemide (LASIX) injection 80 mg  80 mg Intravenous BID    gabapentin (NEURONTIN) capsule 300 mg  300 mg Oral BID    heparin (porcine) 25,000 units in 250 mL infusion (premix)  3-20 Units/kg/hr (Order-Specific) Intravenous Titrated    heparin (porcine) injection 2,000 Units  2,000 Units Intravenous PRN    heparin (porcine) injection 4,000 Units  4,000 Units Intravenous PRN    hydroxychloroquine (PLAQUENIL) tablet 200 mg  200 mg Oral Daily    insulin lispro (HumaLOG) 100 units/mL subcutaneous injection 1-6 Units  1-6 Units Subcutaneous 4x Daily (AC & HS)    levothyroxine tablet 112 mcg  112 mcg Oral Early Morning    methocarbamol (ROBAXIN) tablet 500 mg  500 mg Oral TID PRN    metoprolol tartrate (LOPRESSOR) tablet 25 mg  25 mg Oral BID    multivitamin-minerals (CENTRUM) tablet 1 tablet  1 tablet Oral Daily    nitroglycerin (NITROSTAT) SL tablet 0 4 mg  0 4 mg Sublingual Q5 Min PRN    nitroGLYcerin (TRIDIL) 50 mg in 250 mL infusion (premix)  5-200 mcg/min Intravenous Titrated    nystatin (MYCOSTATIN) powder 1 application  1 application Topical TID    sertraline (ZOLOFT) tablet 25 mg  25 mg Oral HS    and PTA meds:   Prior to Admission Medications   Prescriptions Last Dose Informant Patient Reported? Taking?    HYDROcodone-acetaminophen (NORCO) 5-325 mg per tablet  Self Yes No   Sig: Take 1 tablet by mouth every 6 (six) hours as needed for pain   Levothyroxine Sodium 112 MCG CAPS  Self Yes No   Sig: Take by mouth daily   Multiple Vitamins-Calcium (MULTI-DAY/CALCIUM/EXTRA IRON PO)  Self Yes No   Sig: Take 1 tablet by mouth daily   Multiple Vitamins-Minerals (CENTRUM ADULTS PO)  Self Yes No   Sig: Centrum   Omega-3 Fatty Acids (FISH OIL) 1,000 mg  Self Yes No   Sig: Fish Oil 1,000 mg capsule   acetaminophen (TYLENOL) 325 mg tablet  Self No No   Sig: Take 2 tablets (650 mg total) by mouth every 6 (six) hours as needed for mild pain   Patient taking differently: Take 1,000 mg by mouth 3 (three) times a day as needed for mild pain    albuterol (PROVENTIL HFA,VENTOLIN HFA) 90 mcg/act inhaler  Self Yes No   Sig: Inhale 2 puffs every 4 (four) hours as needed for wheezing   amLODIPine (NORVASC) 5 mg tablet  Self Yes No   Sig: Take 5 mg by mouth every morning    calcium carbonate-vitamin D (OSCAL-D) 500 mg-200 units per tablet  Self Yes No   Sig: Take 1 tablet by mouth 2 (two) times a day with meals     clopidogrel (PLAVIX) 75 mg tablet  Child Yes Yes   Sig: Take 75 mg by mouth daily   docusate sodium (COLACE) 100 mg capsule  Self No No   Sig: Take 1 capsule (100 mg total) by mouth 2 (two) times a day as needed for constipation   Patient taking differently: Take 100 mg by mouth daily    furosemide (LASIX) 20 mg tablet  Self Yes No   Sig: Take 20 mg by mouth daily    gabapentin (NEURONTIN) 300 mg capsule  Self No No   Sig: Take 2 capsules PO three times a day   hydroxychloroquine (PLAQUENIL) 200 mg tablet  Self Yes No   Sig: Take 200 mg by mouth daily    lidocaine (LIDODERM) 5 %  Self Yes No   Sig: Apply 2 patches topically daily Remove & Discard patch within 12 hours or as directed by MD     losartan (COZAAR) 100 MG tablet  Self No No   Sig: Take 0 5 tablets (50 mg total) by mouth daily   methocarbamol (ROBAXIN) 500 mg tablet  Self No No   Sig: Take 1 tablet (500 mg total) by mouth 3 (three) times a day as needed for muscle spasms   metoprolol tartrate (LOPRESSOR) 25 mg tablet  Self Yes No   Sig: Take 25 mg by mouth 2 (two) times a day    nitroglycerin (NITROSTAT) 0 4 mg SL tablet  Self Yes No   Sig: Place under the tongue every 5 (five) minutes as needed for chest pain    nystatin (MYCOSTATIN) powder  Self Yes No   Sig: Apply topically as needed    sertraline (ZOLOFT) 25 mg tablet  Self Yes No   Si mg daily at bedtime       Facility-Administered Medications: None       Allergies   Allergen Reactions    Duloxetine Hcl Other (See Comments) and Hypertension    Duloxetine Hcl      Cymbalta; Hemorrhagic stroke listed as reaction    Escitalopram      Other reaction(s): Urinary Retention    Pregabalin      Other reaction(s): Hypertension    Statins Myalgia    Tramadol      Other reaction(s): Hypertension    Triprolidine-Pse Other (See Comments)    Anastrozole Abdominal Pain    Anastrozole     Antihistamines, Diphenhydramine-Type     Exemestane      Aromasin; Muscle pain & cramps    Lexapro [Escitalopram]      Urinary retention    Lyrica [Pregabalin]      hypertension    Metformin      diarrhea    Oxycodone     Statins      Muscle pain & cramps    Tramadol      hypertension    Triprolidine-Pseudoephedrine     Metformin Diarrhea       Objective   Vitals: Blood pressure 140/63, pulse 57, temperature 97 5 °F (36 4 °C), resp   rate 22, height 4' 11" (1 499 m), weight 101 kg (223 lb 1 7 oz), SpO2 100 %, not currently breastfeeding , Body mass index is 45 06 kg/m² ,   Orthostatic Blood Pressures      Most Recent Value   Blood Pressure  140/63 filed at 2019 1113   Patient Position - Orthostatic VS  Lying filed at 2019 0300            Intake/Output Summary (Last 24 hours) at 2019 1340  Last data filed at 2019 1301  Gross per 24 hour   Intake 113 ml   Output 1527 ml   Net -1414 ml       Invasive Devices     Peripheral Intravenous Line            Peripheral IV 19 Left Wrist less than 1 day    Peripheral IV 19 Right Antecubital less than 1 day          Drain            External Urinary Catheter less than 1 day                Physical Exam:    General:  AO x3, no acute distress  Cardiac:  S1-S2 normal  No murmurs, rubs or gallops, JVP: elevation noted  Lungs:  Bibasilar crackles and some wheezing heard b/l    Abdomen:  Soft nontender nondistended, positive bowel sounds  Extremities:  Warm, well perfused, pulses palpable, no ulcers or rashes, nonpitting edema of b/l lower extremities  Neuro: Grossly nonfocal  Psych:  Normal affect      Lab Results:   Recent Results (from the past 24 hour(s))   ECG 12 lead    Collection Time: 08/16/19 12:47 AM   Result Value Ref Range    Ventricular Rate 80 BPM    Atrial Rate 80 BPM    OR Interval 182 ms    QRSD Interval 96 ms    QT Interval 406 ms    QTC Interval 468 ms    P Axis 62 degrees    QRS Axis -17 degrees    T Wave Axis 70 degrees   CBC and differential    Collection Time: 08/16/19 12:53 AM   Result Value Ref Range    WBC 10 13 4 31 - 10 16 Thousand/uL    RBC 3 55 (L) 3 81 - 5 12 Million/uL    Hemoglobin 11 5 11 5 - 15 4 g/dL    Hematocrit 36 3 34 8 - 46 1 %     (H) 82 - 98 fL    MCH 32 4 26 8 - 34 3 pg    MCHC 31 7 31 4 - 37 4 g/dL    RDW 13 5 11 6 - 15 1 %    MPV 9 4 8 9 - 12 7 fL    Platelets 602 416 - 382 Thousands/uL    nRBC 0 /100 WBCs    Neutrophils Relative 64 43 - 75 %    Immat GRANS % 0 0 - 2 %    Lymphocytes Relative 28 14 - 44 %    Monocytes Relative 5 4 - 12 %    Eosinophils Relative 2 0 - 6 %    Basophils Relative 1 0 - 1 %    Neutrophils Absolute 6 45 1 85 - 7 62 Thousands/µL    Immature Grans Absolute 0 03 0 00 - 0 20 Thousand/uL    Lymphocytes Absolute 2 83 0 60 - 4 47 Thousands/µL    Monocytes Absolute 0 54 0 17 - 1 22 Thousand/µL    Eosinophils Absolute 0 21 0 00 - 0 61 Thousand/µL    Basophils Absolute 0 07 0 00 - 0 10 Thousands/µL   Comprehensive metabolic panel    Collection Time: 08/16/19 12:53 AM   Result Value Ref Range    Sodium 137 136 - 145 mmol/L    Potassium 5 0 3 5 - 5 3 mmol/L    Chloride 103 100 - 108 mmol/L    CO2 26 21 - 32 mmol/L    ANION GAP 8 4 - 13 mmol/L    BUN 33 (H) 5 - 25 mg/dL    Creatinine 1 35 (H) 0 60 - 1 30 mg/dL    Glucose 167 (H) 65 - 140 mg/dL    Calcium 9 5 8 3 - 10 1 mg/dL    AST 37 5 - 45 U/L    ALT 20 12 - 78 U/L    Alkaline Phosphatase 68 46 - 116 U/L    Total Protein 8 1 6 4 - 8 2 g/dL    Albumin 4 0 3 5 - 5 0 g/dL    Total Bilirubin 0 54 0 20 - 1 00 mg/dL    eGFR 36 ml/min/1 73sq m   Troponin I    Collection Time: 08/16/19 12:53 AM   Result Value Ref Range    Troponin I 0 99 (H) <=0 04 ng/mL   NT-BNP PRO (BNP for [facility], [facility], [facility], [place], [place], [facility], [facility], [place] campuses)    Collection Time: 08/16/19 12:53 AM   Result Value Ref Range    NT-proBNP 17,249 (H) <450 pg/mL   APTT six (6) hours after Heparin bolus/drip initiation or dosing change    Collection Time: 08/16/19  2:14 AM   Result Value Ref Range     (HH) 23 - 37 seconds   CBC    Collection Time: 08/16/19  2:14 AM   Result Value Ref Range    WBC 8 16 4 31 - 10 16 Thousand/uL    RBC 3 18 (L) 3 81 - 5 12 Million/uL    Hemoglobin 10 2 (L) 11 5 - 15 4 g/dL    Hematocrit 32 5 (L) 34 8 - 46 1 %     (H) 82 - 98 fL    MCH 32 1 26 8 - 34 3 pg    MCHC 31 4 31 4 - 37 4 g/dL    RDW 13 6 11 6 - 15 1 %    Platelets 567 427 - 064 Thousands/uL    MPV 9 5 8 9 - 12 7 fL   Protime-INR    Collection Time: 08/16/19  2:14 AM   Result Value Ref Range    Protime 14 3 11 6 - 14 5 seconds    INR 1 15 0 84 - 1 19   Hemoglobin A1c w/EAG Estimation (Orders if not completed within the last 90 days)    Collection Time: 08/16/19  4:39 AM   Result Value Ref Range    Hemoglobin A1C 5 0 4 2 - 6 3 %    EAG 97 mg/dl   Troponin I    Collection Time: 08/16/19  4:39 AM   Result Value Ref Range    Troponin I 1 24 (H) <=0 04 ng/mL   CBC (With Platelets)    Collection Time: 08/16/19  4:39 AM   Result Value Ref Range    WBC 8 05 4 31 - 10 16 Thousand/uL    RBC 3 14 (L) 3 81 - 5 12 Million/uL    Hemoglobin 10 1 (L) 11 5 - 15 4 g/dL    Hematocrit 31 7 (L) 34 8 - 46 1 %     (H) 82 - 98 fL    MCH 32 2 26 8 - 34 3 pg    MCHC 31 9 31 4 - 37 4 g/dL    RDW 13 7 11 6 - 15 1 % Platelets 399 345 - 168 Thousands/uL    MPV 9 4 8 9 - 12 7 fL   Basic metabolic panel    Collection Time: 08/16/19  4:39 AM   Result Value Ref Range    Sodium 137 136 - 145 mmol/L    Potassium 4 2 3 5 - 5 3 mmol/L    Chloride 102 100 - 108 mmol/L    CO2 27 21 - 32 mmol/L    ANION GAP 8 4 - 13 mmol/L    BUN 33 (H) 5 - 25 mg/dL    Creatinine 1 28 0 60 - 1 30 mg/dL    Glucose 142 (H) 65 - 140 mg/dL    Calcium 9 1 8 3 - 10 1 mg/dL    eGFR 38 ml/min/1 73sq m   Magnesium    Collection Time: 08/16/19  4:39 AM   Result Value Ref Range    Magnesium 2 1 1 6 - 2 6 mg/dL   Lipid panel    Collection Time: 08/16/19  4:39 AM   Result Value Ref Range    Cholesterol 204 (H) 50 - 200 mg/dL    Triglycerides 99 <=150 mg/dL    HDL, Direct 56 40 - 60 mg/dL    LDL Calculated 128 (H) 0 - 100 mg/dL    Non-HDL-Chol (CHOL-HDL) 148 mg/dl   Fingerstick Glucose (POCT)    Collection Time: 08/16/19  5:58 AM   Result Value Ref Range    POC Glucose 147 (H) 65 - 140 mg/dl   Troponin I    Collection Time: 08/16/19  7:21 AM   Result Value Ref Range    Troponin I 1 22 (H) <=0 04 ng/mL   APTT    Collection Time: 08/16/19  9:28 AM   Result Value Ref Range    PTT 75 (H) 23 - 37 seconds   ]    Imaging: I have personally reviewed pertinent reports

## 2019-08-16 NOTE — ASSESSMENT & PLAN NOTE
Daughter reported increased coughing when swallowing    Given respiratory symptoms will have speech pathologist evaluate swallowing

## 2019-08-16 NOTE — PROGRESS NOTES
Progress Note - Josefa Lira 1934, 80 y o  female MRN: [id number]    Unit/Bed#: [facility] 507-01 Encounter: [id number]    Primary Care Provider: Angelina Momin DO   Date and time admitted to hospital: 8/16/2019 12:33 AM        * Acute respiratory failure with hypoxia [facility])  Assessment & Plan  Acute respiratory failure with hypoxia secondary to decompensated CHF  Was placed on BiPAP overnight and nitroglycerin infusion  Now stable 4 L nasal cannula  Acute heart failure (HCC)  Assessment & Plan  Wt Readings from Last 3 Encounters:   08/16/19 101 kg (223 lb 1 7 oz)   06/25/19 88 9 kg (196 lb)   05/01/19 88 9 kg (196 lb)     Acute on chronic diastolic CHF; reviewed records and patient known to Dr Lesli Hammond  Continue IV furosemide until seen by cardiology  Daughter reports baseline weight is maximum 200 lb    Dysphagia  Assessment & Plan  Daughter reported increased coughing when swallowing  Given respiratory symptoms will have speech pathologist evaluate swallowing    Acute kidney injury [facility])  Assessment & Plan  HERLINDA secondary to volume overload/CHF  Monitor with diuresis    Results from last 7 days   Lab Units 08/16/19  0439 08/16/19  0053   BUN mg/dL 33* 33*   CREATININE mg/dL 1 28 1 35*   EGFR ml/min/1 73sq m 38 36       Elevated troponin  Assessment & Plan  Elevated troponin consideration for NSTEMI    Continue heparin infusion until seen by cardiology    Results from last 7 days   Lab Units 08/16/19  0721 08/16/19  0439 08/16/19  0053   TROPONIN I ng/mL 1 22* 1 24* 0 99*       History of aortic valve replacement with bioprosthetic valve  Assessment & Plan  History of aortic stenosis status post AVR    Rheumatoid arthritis involving multiple sites [facility])  Assessment & Plan  Rheumatoid arthritis continue hydroxychloroquine    Type 2 diabetes mellitus with diabetic polyneuropathy [facility])  Assessment & Plan  Lab Results   Component Value Date    HGBA1C 5 0 08/16/2019     Recent Labs     08/16/19  0558   POCGLU 147*     Diabetes mellitus continue sliding scale insulin    Hypertension  Assessment & Plan  Essential hypertension continue amlodipine and metoprolol  Holding losartan given kidney injury      VTE Pharmacologic Prophylaxis: Heparin Drip    Patient Centered Rounds: I have performed bedside rounds with nursing staff today  Discussions with Specialists or Other Care Team Provider:   Education and Discussions with Family / Patient:  Discussed at length with patient's daughter    Time Spent for Care: 30 mins  More than 50% of total time spent on counseling and coordination of care as described above  Current Length of Stay: 0 day(s)  Current Patient Status: Inpatient     Certification Statement: The patient will continue to require additional inpatient hospital stay due to Acute respiratory failure with hypoxia [facility])  Discharge Plan / Estimated Discharge Date:     Code Status: Level 3 - DNAR and DNI  ______________________________________________________________________________    Subjective:   Patient seen and examined  Had increased work of breathing overnight requiring BiPAP  Has been transition to 4 L nasal cannula this morning    Objective:   Vitals: Blood pressure 140/63, pulse 57, temperature 97 5 °F (36 4 °C), resp  rate 22, height 4' 11" (1 499 m), weight 101 kg (223 lb 1 7 oz), SpO2 100 %, not currently breastfeeding      Physical Exam:   General appearance: alert, appears stated age and cooperative  Head: atraumatic  Lungs: diminished breath sounds  Heart: regular rate and rhythm  Abdomen: soft, non-tender, positive bowel sounds   Back: negative, range of motion normal  Extremities: edema +2 lower extremities bilaterally  Neurologic: Grossly normal    Additional Data:   Labs:  Results from last 7 days   Lab Units 08/16/19  0439 08/16/19  0214 08/16/19  0053   WBC Thousand/uL 8 05 8 16 10 13   HEMOGLOBIN g/dL 10 1* 10 2* 11 5   HEMATOCRIT % 31 7* 32 5* 36 3   MCV fL 101* 102* 102*   PLATELETS Thousands/uL 172 170 196   INR   --  1 15  --      Results from last 7 days   Lab Units 08/16/19  0439 08/16/19  0053   SODIUM mmol/L 137 137   POTASSIUM mmol/L 4 2 5 0   CHLORIDE mmol/L 102 103   CO2 mmol/L 27 26   ANION GAP mmol/L 8 8   BUN mg/dL 33* 33*   CREATININE mg/dL 1 28 1 35*   CALCIUM mg/dL 9 1 9 5   ALBUMIN g/dL  --  4 0   TOTAL BILIRUBIN mg/dL  --  0 54   ALK PHOS U/L  --  68   ALT U/L  --  20   AST U/L  --  37   EGFR ml/min/1 73sq m 38 36   GLUCOSE RANDOM mg/dL 142* 167*     Results from last 7 days   Lab Units 08/16/19  0439   MAGNESIUM mg/dL 2 1     Results from last 7 days   Lab Units 08/16/19  0721 08/16/19  0439 08/16/19  0053   TROPONIN I ng/mL 1 22* 1 24* 0 99*     Results from last 7 days   Lab Units 08/16/19  0053   NT-PRO BNP pg/mL 17,249*          Results from last 7 days   Lab Units 08/16/19  0558   POC GLUCOSE mg/dl 147*     Results from last 7 days   Lab Units 08/16/19  0439   HEMOGLOBIN A1C % 5 0         * I Have Reviewed All Lab Data Listed Above  Cultures:           Imaging:  Imaging Reports Reviewed Today Include:   Procedure: Xr Chest 1 View Portable  Result Date: 8/16/2019  Impression: Mild central vascular congestion in keeping with CHF  Probable small pleural effusions   Workstation performed: [id number]     Scheduled Meds:  Current Facility-Administered Medications:  acetaminophen 650 mg Oral Q6H PRN Latonya Lebanon, DO    albuterol 2 puff Inhalation Q4H PRN Latonya Lebanon, DO    amLODIPine 5 mg Oral QAM Latonya Lebanon, DO    calcium carbonate-vitamin D 1 tablet Oral BID With Meals Latonya Sahil, DO    docusate sodium 100 mg Oral Daily Latonya Lebanon, DO    fish oil 1,000 mg Oral Daily Latonya Sahil, DO    furosemide 80 mg Intravenous BID Gabo Smart MD    heparin (porcine) 3-20 Units/kg/hr (Order-Specific) Intravenous Titrated Latonya Lebanon, DO Last Rate: 11 1 Units/kg/hr (08/16/19 0143)   heparin (porcine) 2,000 Units Intravenous PRN Latonya Lebanon, DO    heparin (porcine) 4,000 Units Intravenous PRN Dareen Pew, DO    hydroxychloroquine 200 mg Oral Daily Dareen Pew, DO    insulin lispro 1-6 Units Subcutaneous Q6H [address] Dareen Pew, DO    levothyroxine 112 mcg Oral Early Morning Dareen Pew, DO    methocarbamol 500 mg Oral TID PRN Dareen Pew, DO    metoprolol tartrate 25 mg Oral BID Dareen Pew, DO    multivitamin-minerals 1 tablet Oral Daily Dareen Pew, DO    nitroglycerin 0 4 mg Sublingual Q5 Min PRN Dareen Pew, DO    nitroGLYcerin 5-200 mcg/min Intravenous Titrated Dareen Pew, DO Last Rate: Stopped (08/16/19 0103)   sertraline 25 mg Oral HS Dareen Pew, DO        Naveed Vinson, DO [address] [facility]  Hospitalist    ** Please Note: This note has been constructed using a voice recognition system   **

## 2019-08-16 NOTE — ASSESSMENT & PLAN NOTE
· Initially presenting with respiratory distress requiring BiPAP therapy, since improved and now on 3L NC supplemental oxygen  Likely in setting of acute heart failure    · Treatment of acute heart failure and potential NSTEMI as below  · Incentive spirometry, pulmonary toileting  · Provide supplemental oxygen as needed to maintain SaO2 greater than 88%

## 2019-08-16 NOTE — ASSESSMENT & PLAN NOTE
HERLINDA secondary to volume overload/CHF    Monitor with diuresis    Results from last 7 days   Lab Units 08/16/19  0439 08/16/19  0053   BUN mg/dL 33* 33*   CREATININE mg/dL 1 28 1 35*   EGFR ml/min/1 73sq m 38 36

## 2019-08-16 NOTE — ASSESSMENT & PLAN NOTE
· Troponin 0 99, EKG without apparent ischemic change  · Unclear at this time if NSTEMI type 1 or NSTEMI type 2 in setting of acute heart failure  · Was initiated on heparin GTT, provided ASA and Brilinta load in ED  · Continue heparin GTT for now  · Continue to trend troponin

## 2019-08-16 NOTE — ASSESSMENT & PLAN NOTE
Acute respiratory failure with hypoxia secondary to decompensated CHF  Was placed on BiPAP overnight and nitroglycerin infusion  Now stable 4 L nasal cannula

## 2019-08-16 NOTE — CONSULTS
Consultation - OB/GYN   Abel Adam 80 y o  female MRN: [id number]  Unit/Bed#: [facility] 507-01 Encounter: [id number]    80 y o  with vulvar hematoma    She has not established gynecologic care    Chief complaint:  I have something down there    HPI:  Florian Alonzo is an 79yo woman with complaints of "something down there" for several months  Pt is interviewed with her daughter  Pt reports occasional feelings of discomfort on her vulva since January  Pt was hospitalized at that time, and used a Wick catheter  Daughter reports that, immediately following hospitalization where she used a wick catheter for greater than a week, she was helping her mother with hygiene when she noticed large, dark purple patches on her mother's vulva  She reports that these patches have not changed in size, shape, or consistency since January  Reports that occasionally the color changes "almost back to normal" but that they usually become dark again  Pt reports that she occasionally feels some pressure or fullness if she "sits down the wrong way" but that this feeling quickly passes if she changes positions  She has been using barrier creams to protect her skin, which she reports has helped  Pt is currently hospitalized for acute respiratory failure with hypoxia, for which she is on O2 via NC, NSTEMI, acute heart failure  ROS:  Denies fevers, chills, night sweats  +fatigue  Denies nausea, emesis, diarrhea  Pt has baseline constipation that she reports has not changed  "It's kind of like pellets sometimes"  +SOB  Denies CP, feelings of chest tightness, palpitations   Reports her breathing "is getting a lot better since I've been here"  Denies vaginal bleeding, discharge, odor, irritation, itching, or present pain      Complications:  Patient Active Problem List   Diagnosis    TIA (transient ischemic attack)    UTI (urinary tract infection)    Hypertension    Diabetes ([facility] )    Hypothyroidism    HX: breast cancer    H/O: CVA (cerebrovascular accident)    Osteoporosis    Arthritis    Fibromyalgia    Bilateral malignant neoplasm of breast in female, estrogen receptor positive ([facility] )    Closed nondisplaced fracture of second cervical vertebra (HCC)    Neck pain, acute    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)    C6 cervical fracture (HCC)    Hypertension    Hypothyroidism    Fall    Ambulatory dysfunction    Acute pain    Renovascular hypertension    Acute heart failure (HCC)    History of CVA (cerebrovascular accident)    Type 2 diabetes mellitus with diabetic polyneuropathy ([facility])    Depression    Stage 3 chronic kidney disease (HCC)    Rheumatoid arthritis involving multiple sites ([facility] )    Fibromyalgia    History of aortic valve replacement with bioprosthetic valve    Renal artery stenosis (HCC)    Parenchymal renal hypertension    Pain    Lesion of left lung    Trigger point    Dermatitis associated with moisture    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    Elevated troponin    Acute respiratory failure with hypoxia ([facility])    Acute kidney injury ([facility] )    Dysphagia       PMH:  Past Medical History:   Diagnosis Date    Arthritis     Breast cancer ([facility] )     Cardiac disease     aortic valve transplant    CHF (congestive heart failure) ([facility])     Compression fracture of body of thoracic vertebra (HCC)     CVA (cerebral vascular accident) ([facility] )     Diabetes mellitus ([facility] )     Disease of thyroid gland     Fibromyalgia, primary     Hyperlipidemia     Hypertension     Neuropathy     Pressure injury of skin     Renal disorder     kidney stent    Stroke ([facility])        PSH:  Past Surgical History:   Procedure Laterality Date    AORTIC VALVE SURGERY      BREAST SURGERY      lumpectomy for breast cancer    CATARACT EXTRACTION      CHOLECYSTECTOMY      HYSTERECTOMY  1978    KIDNEY SURGERY      stent placed for kidney    OTHER SURGICAL HISTORY      abdominal aneurysm surgery    PA ARTHRODESIS POSTERIOR ATLAS-AXIS C1-C2 N/A 5/1/2019    Procedure: C1-C2 lateral mass fixation fusion with possible sublaminar cables;  Surgeon: Adolphus Boas, MD;  [facility]: [facility];  Service: Neurosurgery    REPLACEMENT TOTAL KNEE      left        Social Hx:  Presents with daughter; reports she lives with her  in an apartment next door to her daughter and her family    OB Hx:  OB History   No data available       Meds:  No current facility-administered medications on file prior to encounter        Current Outpatient Medications on File Prior to Encounter   Medication Sig Dispense Refill    clopidogrel (PLAVIX) 75 mg tablet Take 75 mg by mouth daily      acetaminophen (TYLENOL) 325 mg tablet Take 2 tablets (650 mg total) by mouth every 6 (six) hours as needed for mild pain (Patient taking differently: Take 1,000 mg by mouth 3 (three) times a day as needed for mild pain ) 30 tablet 0    albuterol (PROVENTIL HFA,VENTOLIN HFA) 90 mcg/act inhaler Inhale 2 puffs every 4 (four) hours as needed for wheezing      amLODIPine (NORVASC) 5 mg tablet Take 5 mg by mouth every morning       calcium carbonate-vitamin D (OSCAL-D) 500 mg-200 units per tablet Take 1 tablet by mouth 2 (two) times a day with meals        docusate sodium (COLACE) 100 mg capsule Take 1 capsule (100 mg total) by mouth 2 (two) times a day as needed for constipation (Patient taking differently: Take 100 mg by mouth daily ) 30 capsule 0    furosemide (LASIX) 20 mg tablet Take 20 mg by mouth daily       gabapentin (NEURONTIN) 300 mg capsule Take 2 capsules PO three times a day 60 capsule 0    HYDROcodone-acetaminophen (NORCO) 5-325 mg per tablet Take 1 tablet by mouth every 6 (six) hours as needed for pain      hydroxychloroquine (PLAQUENIL) 200 mg tablet Take 200 mg by mouth daily       Levothyroxine Sodium 112 MCG CAPS Take by mouth daily      lidocaine (LIDODERM) 5 % Apply 2 patches topically daily Remove & Discard patch within 12 hours or as directed by MD        losartan (COZAAR) 100 MG tablet Take 0 5 tablets (50 mg total) by mouth daily 15 tablet 0    methocarbamol (ROBAXIN) 500 mg tablet Take 1 tablet (500 mg total) by mouth 3 (three) times a day as needed for muscle spasms 90 tablet 0    metoprolol tartrate (LOPRESSOR) 25 mg tablet Take 25 mg by mouth 2 (two) times a day       Multiple Vitamins-Calcium (MULTI-DAY/CALCIUM/EXTRA IRON PO) Take 1 tablet by mouth daily      Multiple Vitamins-Minerals (CENTRUM ADULTS PO) Centrum      nitroglycerin (NITROSTAT) 0 4 mg SL tablet Place under the tongue every 5 (five) minutes as needed for chest pain       nystatin (MYCOSTATIN) powder Apply topically as needed       Omega-3 Fatty Acids (FISH OIL) 1,000 mg Fish Oil 1,000 mg capsule      sertraline (ZOLOFT) 25 mg tablet 25 mg daily at bedtime          Allergies: Allergies   Allergen Reactions    Duloxetine Hcl Other (See Comments) and Hypertension    Duloxetine Hcl      Cymbalta; Hemorrhagic stroke listed as reaction    Escitalopram      Other reaction(s): Urinary Retention    Pregabalin      Other reaction(s): Hypertension    Statins Myalgia    Tramadol      Other reaction(s): Hypertension    Triprolidine-Pse Other (See Comments)    Anastrozole Abdominal Pain    Anastrozole     Antihistamines, Diphenhydramine-Type     Exemestane      Aromasin; Muscle pain & cramps    Lexapro [Escitalopram]      Urinary retention    Lyrica [Pregabalin]      hypertension    Metformin      diarrhea    Oxycodone     Statins      Muscle pain & cramps    Tramadol      hypertension    Triprolidine-Pseudoephedrine     Metformin Diarrhea       Physical Exam:  /61 (BP Location: Left arm)   Pulse 58   Temp 97 8 °F (36 6 °C)   Resp 20   Ht 4' 11" (1 499 m)   Wt 101 kg (223 lb 1 7 oz)   SpO2 99%   BMI 45 06 kg/m²     Gen:  AaOx3, NAD, pleasant, cooperative  Card: RRR, no murmurs, rubs, or gallops  Pulm: Diffuse wheezing  Abd: Soft, obese, nontender, nondistended  : Kathleen Hung catheter initially in place; removed for examination  Initially attempted to examine pt while pt in supine position; pt does not tolerate supine position secondary to increased respiratory effort and neck pain  Offered pt examination while standing vs  Outpatient examination once pt breathing effort improved  Pt was able to stand at bedside and gently lean forward  On this exam, pt found to have bilateral lower vulvar hematomas, R>L  These were firm, non fluctuant, nontender  No open wounds  No obvious signs of infection  Per daughter's report during exam, these are the same size and shape as previously appreciated  Extremities: Wearing SCDs bilaterally      Assessment:   80 y o  with bilateral vulvar hematomas in setting of anticoagulation  Plan:   Vulvar hematomas:  - Suspect may be secondary to Kathleen Hung catheter use; would recommend cessation of Wick catheter   - Continue barrier creams  - Discussed with pt and with pt daughter that these hematomas would likely regress and reabsorb over time without intervention  - Recommend use of donut seat cushion to alleviate pressure on hematomas  - Pt provided with [address] [place] [address] contact information for outpatient follow up once able to better tolerate physical examination    Thank you for the courtesy of the consultation for this very kind patient  We will be available if there are any additional questions or concerns        Discussed with Dr Jaqueline Sefl,   OB/GYN, PGY4  8/16/2019, 7:02 PM

## 2019-08-16 NOTE — ASSESSMENT & PLAN NOTE
Lab Results   Component Value Date    HGBA1C 4 8 04/09/2019       No results for input(s): POCGLU in the last 72 hours  Blood Sugar Average: Last 72 hrs:  ·  listed past medical history, however appears controlled off medications  · Check A1c in a m    SSI with Fazal

## 2019-08-16 NOTE — RESPIRATORY THERAPY NOTE
resp care      08/16/19 1100   Respiratory Protocol   Respiratory Plan Home Bronchodilator Patient pathway   Respiratory Assessment   Assessment Type Assess only   General Appearance Drowsy;Sleeping   Respiratory Pattern Normal   Chest Assessment Chest expansion symmetrical   Bilateral Breath Sounds Clear;Diminished   Resp Comments Pt taken off HS BiPAP at 0900 hrs and placed on a 4 lpm nc  Pt assesssed per Resp Protocol  No apparent respiratory distress at this time  No MDIs given  BiPAP ordered PRN and avialable if needed  Will continue to monitor per protocol  NC reduced to 3 lpm since SpO2 found at 100% on 4     Additional Assessments   SpO2 100 %

## 2019-08-16 NOTE — ASSESSMENT & PLAN NOTE
· POA, baseline creatinine approximately 1  · Diuresis as above, will monitor creatinine daily  · Hold nephrotoxins, avoid hypotension

## 2019-08-16 NOTE — ASSESSMENT & PLAN NOTE
· Continue amlodipine and metoprolol, holding losartan in setting of acute kidney injury  · Avoid hypotension and monitor blood pressure per protocol

## 2019-08-16 NOTE — ED NOTES
Pt taken of bipap as trial - placed on 2L via [facility], [address] [facility]  08/16/19 5462

## 2019-08-16 NOTE — ED CARE HANDOFF
Emergency Department Sign Out Note        Sign out and transfer of care from [facility]  See Separate Emergency Department note  The patient, Wilda Richter, was evaluated by the previous provider for dyspnea      Workup Completed:  Evaluation for CHF    ED Course / Workup Pending (followup):  CXR mild pulmonary congestion       Results Reviewed     Procedure Component Value Units Date/Time    Comprehensive metabolic panel [[id number]]  (Abnormal) Collected:  08/16/19 0053    Lab Status:  Final result Specimen:  Blood from Arm, Right Updated:  08/16/19 0122     Sodium 137 mmol/L      Potassium 5 0 mmol/L      Chloride 103 mmol/L      CO2 26 mmol/L      ANION GAP 8 mmol/L      BUN 33 mg/dL      Creatinine 1 35 mg/dL      Glucose 167 mg/dL      Calcium 9 5 mg/dL      AST 37 U/L      ALT 20 U/L      Alkaline Phosphatase 68 U/L      Total Protein 8 1 g/dL      Albumin 4 0 g/dL      Total Bilirubin 0 54 mg/dL      eGFR 36 ml/min/1 73sq m     Narrative:       [facility] guidelines for Chronic Kidney Disease (CKD):     Stage 1 with normal or high GFR (GFR > 90 mL/min/1 73 square meters)    Stage 2 Mild CKD (GFR = 60-89 mL/min/1 73 square meters)    Stage 3A Moderate CKD (GFR = 45-59 mL/min/1 73 square meters)    Stage 3B Moderate CKD (GFR = 30-44 mL/min/1 73 square meters)    Stage 4 Severe CKD (GFR = 15-29 mL/min/1 73 square meters)    Stage 5 End Stage CKD (GFR <15 mL/min/1 73 square meters)  Note: GFR calculation is accurate only with a steady state creatinine    Troponin I [[id number]]  (Abnormal) Collected:  08/16/19 0053    Lab Status:  Final result Specimen:  Blood from Arm, Right Updated:  08/16/19 0122     Troponin I 0 99 ng/mL     NT-BNP PRO (BNP for [facility], [facility], [facility], [place], [place], [facility], [facility], [place] campuses) [[id number]]  (Abnormal) Collected:  08/16/19 0053    Lab Status:  Final result Specimen:  Blood from Arm, Right Updated:  08/16/19 0122     NT-proBNP 17,249 pg/mL     CBC and differential [[id number]]  (Abnormal) Collected:  08/16/19 0053    Lab Status:  Final result Specimen:  Blood from Arm, Right Updated:  08/16/19 0058     WBC 10 13 Thousand/uL      RBC 3 55 Million/uL      Hemoglobin 11 5 g/dL      Hematocrit 36 3 %       fL      MCH 32 4 pg      MCHC 31 7 g/dL      RDW 13 5 %      MPV 9 4 fL      Platelets 012 Thousands/uL      nRBC 0 /100 WBCs      Neutrophils Relative 64 %      Immat GRANS % 0 %      Lymphocytes Relative 28 %      Monocytes Relative 5 %      Eosinophils Relative 2 %      Basophils Relative 1 %      Neutrophils Absolute 6 45 Thousands/µL      Immature Grans Absolute 0 03 Thousand/uL      Lymphocytes Absolute 2 83 Thousands/µL      Monocytes Absolute 0 54 Thousand/µL      Eosinophils Absolute 0 21 Thousand/µL      Basophils Absolute 0 07 Thousands/µL             Identification of Seniors at Risk      Most Recent Value   (ISAR) Identification of Seniors at Risk   Before the illness or injury that brought you to the Emergency, did you need someone to help you on a regular basis? 1 Filed at: 08/16/2019 0055   In the last 24 hours, have you needed more help than usual?  1 Filed at: 08/16/2019 0066   Have you been hospitalized for one or more nights during the past 6 months? 1 Filed at: 08/16/2019 0055   In general, do you see well?  0 Filed at: 08/16/2019 0055   In general, do you have serious problems with your memory? 0 Filed at: 08/16/2019 0055   Do you take more than three different medications every day? 1 Filed at: 08/16/2019 0055   ISAR Score  4 Filed at: 08/16/2019 0055                        ED Course as of Aug 16 0124   Fri Aug 16, 2019   0106 79 yo F presents for respiratory distress likely secondary to diastolic heart failure   Plan to initiate bipap to see if symptoms improve      0122 Tn is  99; plan to treat for NSTEMI        Procedures  MDMpatient has received NTG;plan a dose of furosemide; anticipate admission    Disposition  Final diagnoses:   None     ED Disposition     None      Follow-up Information    None       Patient's Medications   Discharge Prescriptions    No medications on file     No discharge procedures on file         ED Provider  Electronically Signed by     Geovany Merida MD  08/16/19 3607       Geovany Merida MD  08/16/19 8434

## 2019-08-16 NOTE — SOCIAL WORK
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  Was [employer], retired & does not drive  Daughter transports  DME rw  H/o vna with JEREMY  No h/o rhb  Denies any MH, D&A tx  [facility] [address] and [facility]  PCP Dr Trisha CASTANO and emergency contact, daughter Monica Gonzalez [phone]  Will have ride home  Cm to follow  Patient/caregiver received discharge checklist  Content reviewed  Patient/caregiver encouraged to participate in discharge plan of care prior to discharge home  CM reviewed d/c planning process including the following: identifying help at home, patient preference for d/c planning needs, Discharge Lounge, [facility] Meds to Bed program, availability of treatment team to discuss questions or concerns patient and/or family may have regarding understanding medications and recognizing signs and symptoms once discharged  CM also encouraged patient to follow up with all recommended appointments after discharge  Patient advised of importance for patient and family to participate in managing patients medical well being

## 2019-08-16 NOTE — RESPIRATORY THERAPY NOTE
RT Protocol Note  Julia Carter 80 y o  female MRN: [id number]  Unit/Bed#: [facility] 507-01 Encounter: [id number]    Assessment    Principal Problem:    Acute respiratory failure with hypoxia ([facility] )  Active Problems:    Hypertension    Hypothyroidism    Acute heart failure (HCC)    Type 2 diabetes mellitus with diabetic polyneuropathy (HCC)    Rheumatoid arthritis involving multiple sites ([facility])    Elevated troponin    Acute kidney injury ([facility] )      Home Pulmonary Medications:  albuterol       Past Medical History:   Diagnosis Date    Arthritis     Breast cancer ([place] [address] )     Cardiac disease     aortic valve transplant    CHF (congestive heart failure) ([facility])     Compression fracture of body of thoracic vertebra ([facility])     CVA (cerebral vascular accident) ([place] [address] )     Diabetes mellitus ([place] [address] )     Disease of thyroid gland     Fibromyalgia, primary     Hyperlipidemia     Hypertension     Neuropathy     Pressure injury of skin     Renal disorder     kidney stent    Stroke [facility])      Social History     Socioeconomic History    Marital status:       Spouse name: None    Number of children: 3    Years of education: None    Highest education level: None   Occupational History    None   Social Needs    Financial resource strain: None    Food insecurity:     Worry: None     Inability: None    Transportation needs:     Medical: None     Non-medical: None   Tobacco Use    Smoking status: Never Smoker    Smokeless tobacco: Never Used   Substance and Sexual Activity    Alcohol use: Not Currently    Drug use: No    Sexual activity: None   Lifestyle    Physical activity:     Days per week: None     Minutes per session: None    Stress: None   Relationships    Social connections:     Talks on phone: None     Gets together: None     Attends Denominational service: None     Active member of club or organization: None     Attends meetings of clubs or organizations: None     Relationship status: None    Intimate partner violence:     Fear of current or ex partner: None     Emotionally abused: None     Physically abused: None     Forced sexual activity: None   Other Topics Concern    None   Social History Narrative    ** Merged History Encounter **            Subjective         Objective    Physical Exam:   Assessment Type: Assess only  General Appearance: Alert, Awake  Respiratory Pattern: Dyspnea at rest, Tachypneic  Chest Assessment: Chest expansion symmetrical  Bilateral Breath Sounds: Clear, Diminished    Vitals:  Blood pressure (!) 173/72, pulse 66, temperature 98 5 °F (36 9 °C), temperature source Oral, resp  rate 18, height 4' 11" (1 499 m), weight 101 kg (223 lb 1 7 oz), SpO2 100 %, not currently breastfeeding  Imaging and other studies: I have personally reviewed pertinent films in PACS          Plan    Respiratory Plan: Vent/NIV/HFNC        Resp Comments: Called to pt room to place pt on BIPAP  Pt initially on BIPAP in the ED for resp distress  Upon assessment pt was using accessory muscles to breathe and she stated she was having SOB  SpO2 WNL  Pt was placed on BIPAP for increased WOB  Bialteral BS were decreased and clear anteriorly  CXR revealed pulmonary vascular congestion  Chart was reviewed at this time  Pt has no pulmonary HX but has a albuterol MDI listed under home medications  Pt will continue to be followed per resp protocol while on BIPAP

## 2019-08-16 NOTE — ED PROVIDER NOTES
History  Chief Complaint   Patient presents with    Shortness of Breath     Patient reports SOB worsening over the past two days  Reports wheezing beginning tonight  HPI  80 y o  Female with PMH DM, CVA, C2 fx s/p fixation in May 2019, and recent CHF diagnosis presents to the ED with wheezing and SOB worsening 2 days  Pt reports sxs are worse when laying flat  She denies fever, chills, cough, chest pain, nausea, vomiting, dizziness, lightheadedness, abdominal pain  States leg swelling is unchanged from baseline  Denies any hx breathing problems in the past  Last echo EF 55% in 2016  Pt does not wear oxygen at home  On arrival she was satting 87% on RA, but came up to 95% on 3L NC  Prior to Admission Medications   Prescriptions Last Dose Informant Patient Reported? Taking?    HYDROcodone-acetaminophen (NORCO) 5-325 mg per tablet  Self Yes No   Sig: Take 1 tablet by mouth every 6 (six) hours as needed for pain   Levothyroxine Sodium 112 MCG CAPS  Self Yes No   Sig: Take by mouth daily   Multiple Vitamins-Calcium (MULTI-DAY/CALCIUM/EXTRA IRON PO)  Self Yes No   Sig: Take 1 tablet by mouth daily   Multiple Vitamins-Minerals (CENTRUM ADULTS PO)  Self Yes No   Sig: Centrum   Omega-3 Fatty Acids (FISH OIL) 1,000 mg  Self Yes No   Sig: Fish Oil 1,000 mg capsule   acetaminophen (TYLENOL) 325 mg tablet  Self No No   Sig: Take 2 tablets (650 mg total) by mouth every 6 (six) hours as needed for mild pain   Patient taking differently: Take 1,000 mg by mouth 3 (three) times a day as needed for mild pain    albuterol (PROVENTIL HFA,VENTOLIN HFA) 90 mcg/act inhaler  Self Yes No   Sig: Inhale 2 puffs every 4 (four) hours as needed for wheezing   amLODIPine (NORVASC) 5 mg tablet  Self Yes No   Sig: Take 5 mg by mouth every morning    calcium carbonate-vitamin D (OSCAL-D) 500 mg-200 units per tablet  Self Yes No   Sig: Take 1 tablet by mouth 2 (two) times a day with meals     docusate sodium (COLACE) 100 mg capsule  Self No No Sig: Take 1 capsule (100 mg total) by mouth 2 (two) times a day as needed for constipation   Patient taking differently: Take 100 mg by mouth daily    furosemide (LASIX) 20 mg tablet  Self Yes No   Sig: Take 20 mg by mouth daily    gabapentin (NEURONTIN) 300 mg capsule  Self No No   Sig: Take 2 capsules PO three times a day   hydroxychloroquine (PLAQUENIL) 200 mg tablet  Self Yes No   Sig: Take 200 mg by mouth daily    lidocaine (LIDODERM) 5 %  Self Yes No   Sig: Apply 2 patches topically daily Remove & Discard patch within 12 hours or as directed by MD     losartan (COZAAR) 100 MG tablet  Self No No   Sig: Take 0 5 tablets (50 mg total) by mouth daily   methocarbamol (ROBAXIN) 500 mg tablet  Self No No   Sig: Take 1 tablet (500 mg total) by mouth 3 (three) times a day as needed for muscle spasms   metoprolol tartrate (LOPRESSOR) 25 mg tablet  Self Yes No   Sig: Take 25 mg by mouth 2 (two) times a day    nitroglycerin (NITROSTAT) 0 4 mg SL tablet  Self Yes No   Sig: Place under the tongue every 5 (five) minutes as needed for chest pain    nystatin (MYCOSTATIN) powder  Self Yes No   Sig: Apply topically as needed    sertraline (ZOLOFT) 25 mg tablet  Self Yes No   Si mg daily at bedtime       Facility-Administered Medications: None       Past Medical History:   Diagnosis Date    Arthritis     Breast cancer ([place] [address] )     Cardiac disease     aortic valve transplant    [facility])     Compression fracture of body of thoracic vertebra ([facility])     CVA (cerebral vascular accident) ([facility] )     Diabetes mellitus ([place] [address] )     Disease of thyroid gland     Fibromyalgia, primary     Hyperlipidemia     Hypertension     Neuropathy     Pressure injury of skin     Renal disorder     kidney stent    Stroke [facility])        Past Surgical History:   Procedure Laterality Date    AORTIC VALVE SURGERY      BREAST SURGERY      lumpectomy for breast cancer    CATARACT EXTRACTION      CHOLECYSTECTOMY      HYSTERECTOMY  1978    KIDNEY SURGERY      stent placed for kidney    OTHER SURGICAL HISTORY      abdominal aneurysm surgery    ID ARTHRODESIS POSTERIOR ATLAS-AXIS C1-C2 N/A 5/1/2019    Procedure: C1-C2 lateral mass fixation fusion with possible sublaminar cables;  Surgeon: Eloy Purcell MD;  [facility]: [facility];  Service: Neurosurgery    REPLACEMENT TOTAL KNEE      left        Family History   Problem Relation Age of Onset    Heart disease Mother     Arthritis Mother     Diabetes Mother     Heart failure Father     Arthritis Father     Other Father         enlargement of prostate     Dementia Father      I have reviewed and agree with the history as documented  Social History     Tobacco Use    Smoking status: Never Smoker    Smokeless tobacco: Never Used   Substance Use Topics    Alcohol use: Not Currently    Drug use: No        Review of Systems   Constitutional: Positive for diaphoresis and fatigue  Negative for chills and fever  HENT: Negative for congestion, rhinorrhea and sore throat  Respiratory: Positive for shortness of breath and wheezing  Negative for chest tightness  Cardiovascular: Negative for chest pain  Gastrointestinal: Negative for abdominal pain, diarrhea, nausea and vomiting  Genitourinary: Negative for dysuria and hematuria  Musculoskeletal: Negative for arthralgias and myalgias  Skin: Negative for rash  Neurological: Negative for dizziness, syncope, weakness, light-headedness, numbness and headaches  Hematological: Bruises/bleeds easily (on plavix)  Psychiatric/Behavioral: Negative for confusion  All other systems reviewed and are negative        Physical Exam  ED Triage Vitals   Temperature Pulse Respirations Blood Pressure SpO2   08/16/19 0059 08/16/19 0045 08/16/19 0045 08/16/19 0045 08/16/19 0045   99 7 °F (37 6 °C) 88 (!) 32 (!) 185/86 (!) 87 %      Temp Source Heart Rate Source Patient Position - Orthostatic VS BP Location FiO2 (%)   08/16/19 0059 08/16/19 0045 08/16/19 0045 08/16/19 0045 --   Axillary Monitor Sitting Right arm       Pain Score       08/16/19 0045       No Pain             Orthostatic Vital Signs  Vitals:    08/16/19 0045 08/16/19 0103 08/16/19 0115 08/16/19 0215   BP: (!) 185/86 112/63 105/55 142/65   Pulse: 88  76 68   Patient Position - Orthostatic VS: Sitting          Physical Exam   Constitutional: She is oriented to person, place, and time  She appears well-developed and well-nourished  No distress  HENT:   Head: Normocephalic and atraumatic  Right Ear: External ear normal    Left Ear: External ear normal    Nose: Nose normal    Eyes: Pupils are equal, round, and reactive to light  Conjunctivae and EOM are normal    Neck: Normal range of motion  Neck supple  Cardiovascular: Normal rate, regular rhythm, normal heart sounds and intact distal pulses  Exam reveals no gallop and no friction rub  No murmur heard  Pulmonary/Chest: Tachypnea noted  She is in respiratory distress  She has decreased breath sounds in the right lower field and the left lower field  She has no wheezes  She has rhonchi in the right upper field, the right middle field, the left upper field and the left middle field  She has no rales  Abdominal: Soft  Bowel sounds are normal  She exhibits no distension and no mass  There is no tenderness  There is no rebound and no guarding  Musculoskeletal: Normal range of motion  She exhibits edema (bilateral lower extremity edema)  Lymphadenopathy:     She has no cervical adenopathy  Neurological: She is alert and oriented to person, place, and time  She has normal strength  No cranial nerve deficit or sensory deficit  Skin: Skin is warm and dry  She is not diaphoretic  Psychiatric: She has a normal mood and affect  Nursing note and vitals reviewed        ED Medications  Medications   nitroGLYcerin (TRIDIL) 50 mg in 250 mL infusion (premix) (0 mcg/min Intravenous Stopped 8/16/19 0103)   heparin (porcine) 25,000 units in 250 mL infusion (premix) (11 1 Units/kg/hr × 90 kg (Order-Specific) Intravenous New Bag 8/16/19 0143)   heparin (porcine) injection 4,000 Units (has no administration in time range)   heparin (porcine) injection 2,000 Units (has no administration in time range)   nitroglycerin (NITROSTAT) SL tablet 0 8 mg (0 8 mg Sublingual Given 8/16/19 0052)   aspirin chewable tablet 324 mg (324 mg Oral Given 8/16/19 0151)   ticagrelor (BRILINTA) tablet 180 mg (180 mg Oral Given 8/16/19 0152)   heparin (porcine) injection 4,000 Units (4,000 Units Intravenous Given 8/16/19 0139)   furosemide (LASIX) injection 40 mg (40 mg Intravenous Given 8/16/19 0307)       Diagnostic Studies  Results Reviewed     Procedure Component Value Units Date/Time    Protime-INR [[id number]]  (Normal) Collected:  08/16/19 0214    Lab Status:  Final result Specimen:  Blood from Arm, Left Updated:  08/16/19 0300     Protime 14 3 seconds      INR 1 15    APTT six (6) hours after Heparin bolus/drip initiation or dosing change [[id number]]  (Abnormal) Collected:  08/16/19 0214    Lab Status:  Final result Specimen:  Blood from Arm, Left Updated:  08/16/19 0300      seconds     CBC [[id number]]  (Abnormal) Collected:  08/16/19 0214    Lab Status:  Final result Specimen:  Blood from Arm, Left Updated:  08/16/19 0223     WBC 8 16 Thousand/uL      RBC 3 18 Million/uL      Hemoglobin 10 2 g/dL      Hematocrit 32 5 %       fL      MCH 32 1 pg      MCHC 31 4 g/dL      RDW 13 6 %      Platelets 984 Thousands/uL      MPV 9 5 fL     Comprehensive metabolic panel [[id number]]  (Abnormal) Collected:  08/16/19 0053    Lab Status:  Final result Specimen:  Blood from Arm, Right Updated:  08/16/19 0122     Sodium 137 mmol/L      Potassium 5 0 mmol/L      Chloride 103 mmol/L      CO2 26 mmol/L      ANION GAP 8 mmol/L      BUN 33 mg/dL      Creatinine 1 35 mg/dL      Glucose 167 mg/dL      Calcium 9 5 mg/dL      AST 37 U/L      ALT 20 U/L      Alkaline Phosphatase 68 U/L      Total Protein 8 1 g/dL      Albumin 4 0 g/dL      Total Bilirubin 0 54 mg/dL      eGFR 36 ml/min/1 73sq m     Narrative:       [place] guidelines for Chronic Kidney Disease (CKD):     Stage 1 with normal or high GFR (GFR > 90 mL/min/1 73 square meters)    Stage 2 Mild CKD (GFR = 60-89 mL/min/1 73 square meters)    Stage 3A Moderate CKD (GFR = 45-59 mL/min/1 73 square meters)    Stage 3B Moderate CKD (GFR = 30-44 mL/min/1 73 square meters)    Stage 4 Severe CKD (GFR = 15-29 mL/min/1 73 square meters)    Stage 5 End Stage CKD (GFR <15 mL/min/1 73 square meters)  Note: GFR calculation is accurate only with a steady state creatinine    Troponin I [[id number]]  (Abnormal) Collected:  08/16/19 0053    Lab Status:  Final result Specimen:  Blood from Arm, Right Updated:  08/16/19 0122     Troponin I 0 99 ng/mL     NT-BNP PRO (BNP for [place], [facility], [facility], [place], [place], [facility], [facility], [place] campuses) [[id number]]  (Abnormal) Collected:  08/16/19 0053    Lab Status:  Final result Specimen:  Blood from Arm, Right Updated:  08/16/19 0122     NT-proBNP 17,249 pg/mL     CBC and differential [[id number]]  (Abnormal) Collected:  08/16/19 0053    Lab Status:  Final result Specimen:  Blood from Arm, Right Updated:  08/16/19 0058     WBC 10 13 Thousand/uL      RBC 3 55 Million/uL      Hemoglobin 11 5 g/dL      Hematocrit 36 3 %       fL      MCH 32 4 pg      MCHC 31 7 g/dL      RDW 13 5 %      MPV 9 4 fL      Platelets 176 Thousands/uL      nRBC 0 /100 WBCs      Neutrophils Relative 64 %      Immat GRANS % 0 %      Lymphocytes Relative 28 %      Monocytes Relative 5 %      Eosinophils Relative 2 %      Basophils Relative 1 %      Neutrophils Absolute 6 45 Thousands/µL      Immature Grans Absolute 0 03 Thousand/uL      Lymphocytes Absolute 2 83 Thousands/µL      Monocytes Absolute 0 54 Thousand/µL      Eosinophils Absolute 0 21 Thousand/µL      Basophils Absolute 0 07 Thousands/µL                  XR chest 1 view portable   ED Interpretation by Bryce Doran MD (56/01 0149)   Pulmonary edema bilateral            Procedures  ECG 12 Lead Documentation Only  Date/Time: 8/16/2019 3:24 AM  Performed by: Bryce Doran MD  Authorized by: Bryce Doran MD     ECG reviewed by me, the ED Provider: yes    Patient location:  ED  Previous ECG:     Previous ECG:  Compared to current    Similarity:  No change  Interpretation:     Interpretation: non-specific    Rate:     ECG rate:  80    ECG rate assessment: normal    Rhythm:     Rhythm: sinus rhythm    Ectopy:     Ectopy: none    QRS:     QRS axis:  Normal    QRS intervals:  Normal  Conduction:     Conduction: normal    ST segments:     ST segments:  Normal  T waves:     T waves: normal              ED Course  ED Course as of Aug 16 0326   Fri Aug 16, 2019   0129 Some bilateral pulmonary edema, will give lasix   XR chest 1 view portable   0129 NT-proBNP(!): 17,249   0130 Will treat for NSTEMI with ASA, brulinta, heparin   Troponin I(!): 0 99   0130 Creatinine(!): 1 35           Identification of Seniors at Risk      Most Recent Value   (ISAR) Identification of Seniors at Risk   Before the illness or injury that brought you to the Emergency, did you need someone to help you on a regular basis? 1 Filed at: 08/16/2019 0055   In the last 24 hours, have you needed more help than usual?  1 Filed at: 08/16/2019 9135   Have you been hospitalized for one or more nights during the past 6 months? 1 Filed at: 08/16/2019 0055   In general, do you see well?  0 Filed at: 08/16/2019 0055   In general, do you have serious problems with your memory? 0 Filed at: 08/16/2019 0055   Do you take more than three different medications every day? 1 Filed at: 08/16/2019 0055   ISAR Score  4 Filed at: 08/16/2019 0055                          MDM  SOB and respiratory distress  Likely CHF exacerbation  Bipap  SL nitro, nitro drip  CBC, CMP, Troponin, BNP, EKG, CXR   Consider lasix if fluid overloaded on CXR  Disposition  Final diagnoses:   NSTEMI (non-ST elevated myocardial infarction) ([facility] )   CHF exacerbation ([place] [address] )   HERLINDA (acute kidney injury) ([place] [address] )     Time reflects when diagnosis was documented in both MDM as applicable and the Disposition within this note     Time User Action Codes Description Comment    8/16/2019  1:43 AM Estefani Ledger Add [I21 4] NSTEMI (non-ST elevated myocardial infarction) ([place] [address] )     8/16/2019  1:43 AM Estefani Ledger Add [I50 9] CHF exacerbation ([place] [address] )     8/16/2019  1:43 AM Estefani Ledger Add [N17 9] HERLINDA (acute kidney injury) [facility])       ED Disposition     ED Disposition Condition Date/Time Comment    Admit Stable Fri Aug 16, 2019  2:40 AM Case was discussed with Dr Samantha Boyce and the patient's admission status was agreed to be Admission Status: inpatient status to the service of Dr Samantha Boyce   Follow-up Information    None         Patient's Medications   Discharge Prescriptions    No medications on file     No discharge procedures on file  ED Provider  Attending physically available and evaluated Aubrey Herzog I managed the patient along with the ED Attending      Electronically Signed by         Gianni Barney MD  08/16/19 3650       Gianni Barney MD  08/16/19 8692

## 2019-08-17 PROBLEM — I50.33 ACUTE ON CHRONIC DIASTOLIC HEART FAILURE (HCC): Status: ACTIVE | Noted: 2019-01-17

## 2019-08-17 LAB
ANION GAP SERPL CALCULATED.3IONS-SCNC: 7 MMOL/L (ref 4–13)
BUN SERPL-MCNC: 33 MG/DL (ref 5–25)
CALCIUM SERPL-MCNC: 9.2 MG/DL (ref 8.3–10.1)
CHLORIDE SERPL-SCNC: 99 MMOL/L (ref 100–108)
CO2 SERPL-SCNC: 28 MMOL/L (ref 21–32)
CREAT SERPL-MCNC: 1.28 MG/DL (ref 0.6–1.3)
ERYTHROCYTE [DISTWIDTH] IN BLOOD BY AUTOMATED COUNT: 13.4 % (ref 11.6–15.1)
GFR SERPL CREATININE-BSD FRML MDRD: 38 ML/MIN/1.73SQ M
GLUCOSE SERPL-MCNC: 112 MG/DL (ref 65–140)
GLUCOSE SERPL-MCNC: 122 MG/DL (ref 65–140)
GLUCOSE SERPL-MCNC: 144 MG/DL (ref 65–140)
HCT VFR BLD AUTO: 32.6 % (ref 34.8–46.1)
HGB BLD-MCNC: 9.9 G/DL (ref 11.5–15.4)
MCH RBC QN AUTO: 31.7 PG (ref 26.8–34.3)
MCHC RBC AUTO-ENTMCNC: 30.4 G/DL (ref 31.4–37.4)
MCV RBC AUTO: 105 FL (ref 82–98)
PLATELET # BLD AUTO: 169 THOUSANDS/UL (ref 149–390)
PMV BLD AUTO: 9.5 FL (ref 8.9–12.7)
POTASSIUM SERPL-SCNC: 3.9 MMOL/L (ref 3.5–5.3)
RBC # BLD AUTO: 3.12 MILLION/UL (ref 3.81–5.12)
SODIUM SERPL-SCNC: 134 MMOL/L (ref 136–145)
WBC # BLD AUTO: 6.73 THOUSAND/UL (ref 4.31–10.16)

## 2019-08-17 PROCEDURE — 80048 BASIC METABOLIC PNL TOTAL CA: CPT | Performed by: INTERNAL MEDICINE

## 2019-08-17 PROCEDURE — 82948 REAGENT STRIP/BLOOD GLUCOSE: CPT

## 2019-08-17 PROCEDURE — 85027 COMPLETE CBC AUTOMATED: CPT | Performed by: INTERNAL MEDICINE

## 2019-08-17 PROCEDURE — 99232 SBSQ HOSP IP/OBS MODERATE 35: CPT | Performed by: INTERNAL MEDICINE

## 2019-08-17 RX ORDER — LOSARTAN POTASSIUM 50 MG/1
50 TABLET ORAL DAILY
Status: DISCONTINUED | OUTPATIENT
Start: 2019-08-17 | End: 2019-08-18

## 2019-08-17 RX ORDER — POTASSIUM CHLORIDE 20 MEQ/1
20 TABLET, EXTENDED RELEASE ORAL DAILY
Status: DISCONTINUED | OUTPATIENT
Start: 2019-08-17 | End: 2019-08-20 | Stop reason: HOSPADM

## 2019-08-17 RX ORDER — HEPARIN SODIUM 5000 [USP'U]/ML
5000 INJECTION, SOLUTION INTRAVENOUS; SUBCUTANEOUS EVERY 8 HOURS SCHEDULED
Status: DISCONTINUED | OUTPATIENT
Start: 2019-08-17 | End: 2019-08-20 | Stop reason: HOSPADM

## 2019-08-17 RX ADMIN — HEPARIN SODIUM 5000 UNITS: 5000 INJECTION INTRAVENOUS; SUBCUTANEOUS at 16:00

## 2019-08-17 RX ADMIN — FUROSEMIDE 80 MG: 10 INJECTION, SOLUTION INTRAMUSCULAR; INTRAVENOUS at 09:17

## 2019-08-17 RX ADMIN — CALCIUM CARBONATE-VITAMIN D TAB 500 MG-200 UNIT 1 TABLET: 500-200 TAB at 08:00

## 2019-08-17 RX ADMIN — LOSARTAN POTASSIUM 50 MG: 50 TABLET ORAL at 09:10

## 2019-08-17 RX ADMIN — POTASSIUM CHLORIDE 20 MEQ: 1500 TABLET, EXTENDED RELEASE ORAL at 09:08

## 2019-08-17 RX ADMIN — ACETAMINOPHEN 650 MG: 325 TABLET ORAL at 20:07

## 2019-08-17 RX ADMIN — DOCUSATE SODIUM 100 MG: 100 CAPSULE, LIQUID FILLED ORAL at 09:09

## 2019-08-17 RX ADMIN — AMLODIPINE BESYLATE 5 MG: 5 TABLET ORAL at 09:10

## 2019-08-17 RX ADMIN — GABAPENTIN 600 MG: 300 CAPSULE ORAL at 09:09

## 2019-08-17 RX ADMIN — GABAPENTIN 600 MG: 300 CAPSULE ORAL at 21:14

## 2019-08-17 RX ADMIN — SERTRALINE HYDROCHLORIDE 25 MG: 25 TABLET ORAL at 21:15

## 2019-08-17 RX ADMIN — HYDROXYCHLOROQUINE SULFATE 200 MG: 200 TABLET, FILM COATED ORAL at 09:11

## 2019-08-17 RX ADMIN — METHOCARBAMOL 500 MG: 500 TABLET, FILM COATED ORAL at 03:24

## 2019-08-17 RX ADMIN — METOPROLOL TARTRATE 25 MG: 25 TABLET, FILM COATED ORAL at 09:09

## 2019-08-17 RX ADMIN — FUROSEMIDE 80 MG: 10 INJECTION, SOLUTION INTRAMUSCULAR; INTRAVENOUS at 17:12

## 2019-08-17 RX ADMIN — GABAPENTIN 600 MG: 300 CAPSULE ORAL at 16:00

## 2019-08-17 RX ADMIN — NYSTATIN 1 APPLICATION: 100000 POWDER TOPICAL at 09:11

## 2019-08-17 RX ADMIN — CALCIUM CARBONATE-VITAMIN D TAB 500 MG-200 UNIT 1 TABLET: 500-200 TAB at 16:00

## 2019-08-17 RX ADMIN — Medication 1000 MG: at 09:08

## 2019-08-17 RX ADMIN — METOPROLOL TARTRATE 25 MG: 25 TABLET, FILM COATED ORAL at 21:14

## 2019-08-17 RX ADMIN — HEPARIN SODIUM 5000 UNITS: 5000 INJECTION INTRAVENOUS; SUBCUTANEOUS at 21:14

## 2019-08-17 RX ADMIN — NYSTATIN 1 APPLICATION: 100000 POWDER TOPICAL at 16:01

## 2019-08-17 RX ADMIN — ACETAMINOPHEN 650 MG: 325 TABLET ORAL at 06:21

## 2019-08-17 RX ADMIN — LEVOTHYROXINE SODIUM 112 MCG: 112 TABLET ORAL at 05:47

## 2019-08-17 RX ADMIN — NYSTATIN 1 APPLICATION: 100000 POWDER TOPICAL at 21:15

## 2019-08-17 RX ADMIN — Medication 1 TABLET: at 09:09

## 2019-08-17 NOTE — ASSESSMENT & PLAN NOTE
· Repeat 2D echocardiogram this admission shows normal functioning bioprosthetic valve    No further intervention indicated at this time

## 2019-08-17 NOTE — ASSESSMENT & PLAN NOTE
· Likely in the setting of acute on chronic diastolic congestive heart failure  She is weaned off BiPAP and currently saturating well on room air with diuresis  · Continue incentive spirometry  · Diuresis as outlined below  · Continue to monitor respiratory status closely  P r n   Supplemental oxygen for O2 sats less than 92% on room air

## 2019-08-17 NOTE — ASSESSMENT & PLAN NOTE
Lab Results   Component Value Date    HGBA1C 5 0 08/16/2019     Recent Labs     08/16/19  1648 08/16/19  2104 08/17/19  0711 08/17/19  1056   POCGLU 135 115 144* 122     · Controlled currently  A1c is only 5    Will discontinue insulin sliding scale and blood glucose checks

## 2019-08-17 NOTE — PROGRESS NOTES
Progress Note - Kirs Carpenter 1934, 80 y o  female MRN: [id number]    Unit/Bed#: [facility] 507-01 Encounter: [id number]    Primary Care Provider: Jones Hernandez DO   Date and time admitted to hospital: 8/16/2019 12:33 AM        * Acute respiratory failure with hypoxia ([address] )  Assessment & Plan  · Likely in the setting of acute on chronic diastolic congestive heart failure  She is weaned off BiPAP and currently saturating well on room air with diuresis  · Continue incentive spirometry  · Diuresis as outlined below  · Continue to monitor respiratory status closely  P r n  Supplemental oxygen for O2 sats less than 92% on room air    Acute on chronic diastolic heart failure [facility])  Assessment & Plan  Wt Readings from Last 3 Encounters:   08/17/19 101 kg (222 lb 3 6 oz)   06/25/19 88 9 kg (196 lb)   05/01/19 88 9 kg (196 lb)     · Appreciate Cardiology recommendations  Repeat 2D echocardiogram shows preserved EF with grade 2 diastolic dysfunction  Patient has history of bioprosthetic aortic valve which appears to be normally functioning  · Patient is responding well to IV diuretics, though still appears volume overloaded  She is on room air though weight is still significantly up at 222 lb (she was 196 lb in the end of June)  · Will continue aggressive diuresis with IV Lasix 80 mg b i d  · Replete lytes as needed  · Telemetry monitoring  · Fluid and sodium restriction    Elevated troponin  Assessment & Plan  · This is likely a non MI troponin elevation in the setting of her on chronic diastolic congestive heart failure exacerbation and acute hypoxic respiratory failure  No ischemic changes noted on EKG    · Will continue to monitor  · Per Cardiology, patient will require eventually an updated ischemic evaluation as an outpatient with her cardiologist  · Discontinue heparin gtt and start DVT prophylaxis    Results from last 7 days   Lab Units 08/16/19  0721 08/16/19  0439 08/16/19  0053   TROPONIN I ng/mL 1 22* 1 24* 0 99*       Dysphagia  Assessment & Plan  · Patient was evaluated by speech pathology  Patient did well with swallowing study though there is question of possible esophageal dysphagia  Patient should follow up with Gastroenterology in the outpatient setting for consideration of EGD  · For now continue regular diet as tolerated    Acute kidney injury [facility])  Assessment & Plan    Results from last 7 days   Lab Units 08/17/19  0559 08/16/19  0439 08/16/19  0053   BUN mg/dL 33* 33* 33*   CREATININE mg/dL 1 28 1 28 1 35*   EGFR ml/min/1 73sq m 38 38 36   ·  Renal function improving  Baseline appears between 1 0-1 2  · Will restart losartan  · Continue to monitor BMP    History of aortic valve replacement with bioprosthetic valve  Assessment & Plan  · Repeat 2D echocardiogram this admission shows normal functioning bioprosthetic valve  No further intervention indicated at this time    Rheumatoid arthritis involving multiple sites [facility])  Assessment & Plan  · Continue hydroxychloroquine  Stable    Type 2 diabetes mellitus with diabetic polyneuropathy [facility])  Assessment & Plan  Lab Results   Component Value Date    HGBA1C 5 0 08/16/2019     Recent Labs     08/16/19  1648 08/16/19  2104 08/17/19  0711 08/17/19  1056   POCGLU 135 115 144* 122     · Controlled currently  A1c is only 5  Will discontinue insulin sliding scale and blood glucose checks    Hypothyroidism  Assessment & Plan  · Stable  Continue levothyroxine 112 mcg daily replacement    Hypertension  Assessment & Plan  · Continue Lopressor 25 mg b i d  And amlodipine 5 mg daily  · Will restart losartan today 50 mg daily  · Continue to monitor    VTE Pharmacologic Prophylaxis:   Pharmacologic: Heparin  Mechanical VTE Prophylaxis in Place: Yes    Patient Centered Rounds: I have performed bedside rounds with nursing staff today      Discussions with Specialists or Other Care Team Provider:  Cardiology    Education and Discussions with Family / Patient: Patient as well as patient's daughter at bedside    Time Spent for Care: 30 minutes  More than 50% of total time spent on counseling and coordination of care as described above  Current Length of Stay: 1 day(s)    Current Patient Status: Inpatient   Certification Statement: The patient will continue to require additional inpatient hospital stay due to IV diuresis for acute on chronic diastolic congestive heart failure exacerbation    Discharge Plan:  Continue aggressive IV diuretics    Code Status: Level 3 - DNAR and DNI      Subjective:   Patient seen examined this morning  No acute events overnight  She feels like she is breathing better this morning  She denies any pain  She denies fevers, chills, nausea or vomiting    Objective:     Vitals:   Temp (24hrs), Av 2 °F (36 8 °C), Min:97 8 °F (36 6 °C), Max:98 8 °F (37 1 °C)    Temp:  [97 8 °F (36 6 °C)-98 8 °F (37 1 °C)] 98 °F (36 7 °C)  HR:  [58-68] 61  Resp:  [16-20] 16  BP: (115-155)/(51-68) 115/51  SpO2:  [94 %-99 %] 98 %  Body mass index is 44 88 kg/m²  Input and Output Summary (last 24 hours): Intake/Output Summary (Last 24 hours) at 2019 1509  Last data filed at 2019 1459  Gross per 24 hour   Intake 1046 17 ml   Output 4273 ml   Net -3226 83 ml       Physical Exam:     Physical Exam   Constitutional: She is oriented to person, place, and time  She appears well-developed and well-nourished  HENT:   Head: Normocephalic and atraumatic  Eyes: Pupils are equal, round, and reactive to light  Cardiovascular: Normal rate and regular rhythm  No murmur heard  Pulmonary/Chest: Effort normal  No stridor  No respiratory distress  Mild conversational tachypnea   Abdominal: Soft  She exhibits no distension  There is no tenderness  Obese habitus   Musculoskeletal: She exhibits edema (3+ lower extremity pitting edema with venous stasis changes)  Neurological: She is alert and oriented to person, place, and time   No cranial nerve deficit  Skin: Skin is warm and dry  Vitals reviewed  Additional Data:     Labs:    Results from last 7 days   Lab Units 08/17/19  0559  08/16/19  0053   WBC Thousand/uL 6 73   < > 10 13   HEMOGLOBIN g/dL 9 9*   < > 11 5   HEMATOCRIT % 32 6*   < > 36 3   PLATELETS Thousands/uL 169   < > 196   NEUTROS PCT %  --   --  64   LYMPHS PCT %  --   --  28   MONOS PCT %  --   --  5   EOS PCT %  --   --  2    < > = values in this interval not displayed  Results from last 7 days   Lab Units 08/17/19  0559  08/16/19  0053   SODIUM mmol/L 134*   < > 137   POTASSIUM mmol/L 3 9   < > 5 0   CHLORIDE mmol/L 99*   < > 103   CO2 mmol/L 28   < > 26   BUN mg/dL 33*   < > 33*   CREATININE mg/dL 1 28   < > 1 35*   ANION GAP mmol/L 7   < > 8   CALCIUM mg/dL 9 2   < > 9 5   ALBUMIN g/dL  --   --  4 0   TOTAL BILIRUBIN mg/dL  --   --  0 54   ALK PHOS U/L  --   --  68   ALT U/L  --   --  20   AST U/L  --   --  37   GLUCOSE RANDOM mg/dL 112   < > 167*    < > = values in this interval not displayed  Results from last 7 days   Lab Units 08/16/19  0214   INR  1 15     Results from last 7 days   Lab Units 08/17/19  1056 08/17/19  0711 08/16/19  2104 08/16/19  1648 08/16/19  1328 08/16/19  0558   POC GLUCOSE mg/dl 122 144* 115 135 158* 147*     Results from last 7 days   Lab Units 08/16/19  0439   HEMOGLOBIN A1C % 5 0         * I Have Reviewed All Lab Data Listed Above  * Additional Pertinent Lab Tests Reviewed: All Labs Within Last 24 Hours Reviewed    Imaging:   Xr Chest 1 View Portable    Result Date: 8/16/2019  Impression: Mild central vascular congestion in keeping with CHF  Probable small pleural effusions   Workstation performed: [id number]     Last 24 Hours Medication List:     Current Facility-Administered Medications:  acetaminophen 650 mg Oral Q6H PRN Regan Danas, DO   albuterol 2 puff Inhalation Q4H PRN Regan Danas, DO   amLODIPine 5 mg Oral QAM Regan Danas, DO   calcium carbonate-vitamin D 1 tablet Oral BID With Meals Ruben Clifton,    docusate sodium 100 mg Oral Daily Ruben Clifton, DO   fish oil 1,000 mg Oral Daily Ruben Clifton,    furosemide 80 mg Intravenous BID Aixa Bauer MD   gabapentin 600 mg Oral TID Tanya Gan, DO   heparin (porcine) 5,000 Units Subcutaneous [facility] Teto Dowell MD   hydroxychloroquine 200 mg Oral Daily Ruben Clifton, DO   levothyroxine 112 mcg Oral Early Morning Ruben Clifton, DO   losartan 50 mg Oral Daily Teto Dowell MD   methocarbamol 500 mg Oral TID PRN Ruben Clifton DO   metoprolol tartrate 25 mg Oral BID Ruben Clifton,    multivitamin-minerals 1 tablet Oral Daily Ruben Clifton DO   nitroglycerin 0 4 mg Sublingual Q5 Min PRN Ruben Clifton DO   nystatin 1 application Topical TID Tanya Gan, DO   potassium chloride 20 mEq Oral Daily Teto Dowell MD   sertraline 25 mg Oral HS Ruben Clifton DO        Today, Patient Was Seen By: Teto Dowell MD    ** Please Note: Dictation voice to text software may have been used in the creation of this document   **

## 2019-08-17 NOTE — ASSESSMENT & PLAN NOTE
· Continue Lopressor 25 mg b i d   And amlodipine 5 mg daily  · Will restart losartan today 50 mg daily  · Continue to monitor

## 2019-08-17 NOTE — ASSESSMENT & PLAN NOTE
Wt Readings from Last 3 Encounters:   08/17/19 101 kg (222 lb 3 6 oz)   06/25/19 88 9 kg (196 lb)   05/01/19 88 9 kg (196 lb)     · Appreciate Cardiology recommendations  Repeat 2D echocardiogram shows preserved EF with grade 2 diastolic dysfunction  Patient has history of bioprosthetic aortic valve which appears to be normally functioning  · Patient is responding well to IV diuretics, though still appears volume overloaded  She is on room air though weight is still significantly up at 222 lb (she was 196 lb in the end of June)  · Will continue aggressive diuresis with IV Lasix 80 mg b i d    · Replete lytes as needed  · Telemetry monitoring  · Fluid and sodium restriction

## 2019-08-17 NOTE — ASSESSMENT & PLAN NOTE
· This is likely a non MI troponin elevation in the setting of her on chronic diastolic congestive heart failure exacerbation and acute hypoxic respiratory failure  No ischemic changes noted on EKG    · Will continue to monitor  · Per Cardiology, patient will require eventually an updated ischemic evaluation as an outpatient with her cardiologist  · Discontinue heparin gtt and start DVT prophylaxis    Results from last 7 days   Lab Units 08/16/19  0721 08/16/19  0439 08/16/19  0053   TROPONIN I ng/mL 1 22* 1 24* 0 99*

## 2019-08-17 NOTE — PROGRESS NOTES
Cardiology Progress Note - Steven Cain 80 y o  female MRN: [id number]    Unit/Bed#: [facility] 507-01 Encounter: [id number]      Assessment:  Principal Problem:    Acute respiratory failure with hypoxia ([facility] [address] [address] )  Active Problems:    Hypertension    Hypothyroidism    Acute heart failure ([facility] [address] )    Type 2 diabetes mellitus with diabetic polyneuropathy (HCC)    Rheumatoid arthritis involving multiple sites [facility])    History of aortic valve replacement with bioprosthetic valve    Elevated troponin    Acute kidney injury ([facility] )    Dysphagia      Plan:    1  Acute on chronic diastolic CHF - She had a very good response to IV Lasix and is significantly improved from a volume standpoint  At this point we will continue IV Lasix today, and likely switch her to oral tomorrow  She will be discharged on torsemide instead of Lasix, likely at about 40 mg daily  Continue to follow urine output, daily labs and weight  Echocardiogram shows preserved LV function with grade 2 diastolic dysfunction      2  History of bioprosthetic aortic valve replacement - Echocardiogram shows normally functioning prosthetic valve  No other issues from this standpoint      3  Non MI troponin elevation - This Is associated with her acute CHF and respiratory failure  No symptoms of angina and no ECG changes seen  Medical management as above  She will require eventually an updated ischemic evaluation as an outpatient  Subjective:   Patient seen and examined  No significant events overnight  Patient states he is feeling better  Less short of breath  Had a good overall response to IV Lasix  Objective:     Vitals: Blood pressure 145/66, pulse 62, temperature 98 4 °F (36 9 °C), temperature source Oral, resp   rate 16, height 4' 11" (1 499 m), weight 101 kg (222 lb 3 6 oz), SpO2 99 %, not currently breastfeeding , Body mass index is 44 88 kg/m² ,   Orthostatic Blood Pressures      Most Recent Value   Blood Pressure  145/66 filed at 08/17/2019 4992 Patient Position - Orthostatic VS  Lying filed at 08/17/2019 0806            Intake/Output Summary (Last 24 hours) at 8/17/2019 0914  Last data filed at 8/17/2019 0806  Gross per 24 hour   Intake 856 17 ml   Output 4758 ml   Net -3901 83 ml       No significant arrhythmias seen on telemetry review  Physical Exam:    GEN: Michelle Clark appears well, alert and oriented x 3, pleasant and cooperative   HEENT: pupils equal, round, and reactive to light; extraocular muscles intact  NECK: supple, no carotid bruits  Improved JVP   HEART: regular rhythm, normal S1 and S2, soft systolic murmur, no clicks, gallops or rubs   LUNGS:  Diminished at bases bilaterally; no wheezes, rales, or rhonchi   ABDOMEN: normal bowel sounds, soft, no tenderness, no distention  EXTREMITIES: peripheral pulses normal; no clubbing, cyanosis    + edema bilaterally - improved  NEURO: no focal findings   SKIN: normal without suspicious lesions on exposed skin    Medications:      Current Facility-Administered Medications:     acetaminophen (TYLENOL) tablet 650 mg, 650 mg, Oral, Q6H PRN, Chad Oconnor DO, 650 mg at 08/17/19 0621    albuterol (PROVENTIL HFA,VENTOLIN HFA) inhaler 2 puff, 2 puff, Inhalation, Q4H PRN, Chad Oconnor DO, 2 puff at 08/16/19 1541    amLODIPine (NORVASC) tablet 5 mg, 5 mg, Oral, QAM, Chad Oconnor, DO, 5 mg at 08/17/19 0910    calcium carbonate-vitamin D (OSCAL-D) 500 mg-200 units per tablet 1 tablet, 1 tablet, Oral, BID With Meals, Chad Oconnor DO, 1 tablet at 08/17/19 0800    docusate sodium (COLACE) capsule 100 mg, 100 mg, Oral, Daily, Chad Oconnor DO, 100 mg at 08/17/19 0909    fish oil capsule 1,000 mg, 1,000 mg, Oral, Daily, Chad Oconnor, DO, 1,000 mg at 08/17/19 0908    furosemide (LASIX) injection 80 mg, 80 mg, Intravenous, BID, Anaya Bey MD, 80 mg at 08/16/19 1759    gabapentin (NEURONTIN) capsule 600 mg, 600 mg, Oral, TID, Gt Davila DO, 600 mg at 08/17/19 0955   hydroxychloroquine (PLAQUENIL) tablet 200 mg, 200 mg, Oral, Daily, Artelia Africa, DO, 200 mg at 08/17/19 0911    insulin lispro (HumaLOG) 100 units/mL subcutaneous injection 1-6 Units, 1-6 Units, Subcutaneous, 4x Daily (AC & HS) **AND** Fingerstick Glucose (POCT), , , 4x Daily AC and at bedtime, Marcheta Noon, DO    levothyroxine tablet 112 mcg, 112 mcg, Oral, Early Morning, Artelia Natty, DO, 112 mcg at 08/17/19 0547    losartan (COZAAR) tablet 50 mg, 50 mg, Oral, Daily, Lindsey Kay MD, 50 mg at 08/17/19 0910    methocarbamol (ROBAXIN) tablet 500 mg, 500 mg, Oral, TID PRN, Artelia Africa, DO, 500 mg at 08/17/19 0324    metoprolol tartrate (LOPRESSOR) tablet 25 mg, 25 mg, Oral, BID, Artelia Africa, DO, 25 mg at 08/17/19 0909    multivitamin-minerals (CENTRUM) tablet 1 tablet, 1 tablet, Oral, Daily, Artelia Africa, DO, 1 tablet at 08/17/19 0909    nitroglycerin (NITROSTAT) SL tablet 0 4 mg, 0 4 mg, Sublingual, Q5 Min PRN, Artelia Natty, DO    nystatin (MYCOSTATIN) powder 1 application, 1 application, Topical, TID, Gt Davila, DO, 1 application at 22/03/26 0911    potassium chloride (K-DUR,KLOR-CON) CR tablet 20 mEq, 20 mEq, Oral, Daily, Lindsey Kay MD, 20 mEq at 08/17/19 0908    sertraline (ZOLOFT) tablet 25 mg, 25 mg, Oral, HS, Artelia Africa, DO, 25 mg at 08/16/19 2113     Labs & Results:    Results from last 7 days   Lab Units 08/16/19  0721 08/16/19  0439 08/16/19  0053   TROPONIN I ng/mL 1 22* 1 24* 0 99*     Results from last 7 days   Lab Units 08/17/19  0559 08/16/19  0439 08/16/19  0214   WBC Thousand/uL 6 73 8 05 8 16   HEMOGLOBIN g/dL 9 9* 10 1* 10 2*   HEMATOCRIT % 32 6* 31 7* 32 5*   PLATELETS Thousands/uL 169 172 170     Results from last 7 days   Lab Units 08/16/19  0439   TRIGLYCERIDES mg/dL 99   HDL mg/dL 56     Results from last 7 days   Lab Units 08/17/19  0559 08/16/19  0439 08/16/19  0053   POTASSIUM mmol/L 3 9 4 2 5 0   CHLORIDE mmol/L 99* 102 103   CO2 mmol/L 28 27 26   BUN mg/dL 33* 33* 33*   CREATININE mg/dL 1 28 1 28 1 35*   CALCIUM mg/dL 9 2 9 1 9 5   ALK PHOS U/L  --   --  68   ALT U/L  --   --  20   AST U/L  --   --  37     Results from last 7 days   Lab Units 08/16/19  0928 08/16/19  0214   INR   --  1 15   PTT seconds 75* 173*     Results from last 7 days   Lab Units 08/16/19  0439   MAGNESIUM mg/dL 2 1         EKG personally reviewed by Donna Arreola MD   Sinus rhythm    ECHO:  LEFT VENTRICLE:  Systolic function was normal by visual assessment  Ejection fraction was estimated to be 55 %  There was moderate hypokinesis of the mid anteroseptal wall(s)  Features were consistent with a pseudonormal left ventricular filling pattern, with concomitant abnormal relaxation and increased filling pressure (grade 2 diastolic dysfunction)      RIGHT VENTRICLE:  Systolic pressure was mildly increased  Estimated peak pressure was 40 mmHg      LEFT ATRIUM:  The atrium was mildly dilated      MITRAL VALVE:  There was mild to moderate annular calcification  There was mild regurgitation      AORTIC VALVE:  A bioprosthesis was present  It exhibited normal function      TRICUSPID VALVE:  There was mild regurgitation      PULMONIC VALVE:  There was trace regurgitation  Counseling / Coordination of Care  Total floor / unit time spent today 25 minutes  Greater than 50% of total time was spent with the patient and / or family counseling and / or coordination of care

## 2019-08-17 NOTE — ASSESSMENT & PLAN NOTE
· Patient was evaluated by speech pathology  Patient did well with swallowing study though there is question of possible esophageal dysphagia    Patient should follow up with Gastroenterology in the outpatient setting for consideration of EGD  · For now continue regular diet as tolerated

## 2019-08-18 LAB
ANION GAP SERPL CALCULATED.3IONS-SCNC: 11 MMOL/L (ref 4–13)
BASOPHILS # BLD AUTO: 0.04 THOUSANDS/ΜL (ref 0–0.1)
BASOPHILS NFR BLD AUTO: 1 % (ref 0–1)
BUN SERPL-MCNC: 41 MG/DL (ref 5–25)
CALCIUM SERPL-MCNC: 9.5 MG/DL (ref 8.3–10.1)
CHLORIDE SERPL-SCNC: 100 MMOL/L (ref 100–108)
CO2 SERPL-SCNC: 22 MMOL/L (ref 21–32)
CREAT SERPL-MCNC: 1.59 MG/DL (ref 0.6–1.3)
EOSINOPHIL # BLD AUTO: 0.22 THOUSAND/ΜL (ref 0–0.61)
EOSINOPHIL NFR BLD AUTO: 4 % (ref 0–6)
ERYTHROCYTE [DISTWIDTH] IN BLOOD BY AUTOMATED COUNT: 13.3 % (ref 11.6–15.1)
GFR SERPL CREATININE-BSD FRML MDRD: 29 ML/MIN/1.73SQ M
GLUCOSE SERPL-MCNC: 94 MG/DL (ref 65–140)
HCT VFR BLD AUTO: 37.4 % (ref 34.8–46.1)
HGB BLD-MCNC: 11 G/DL (ref 11.5–15.4)
IMM GRANULOCYTES # BLD AUTO: 0.01 THOUSAND/UL (ref 0–0.2)
IMM GRANULOCYTES NFR BLD AUTO: 0 % (ref 0–2)
LYMPHOCYTES # BLD AUTO: 2.01 THOUSANDS/ΜL (ref 0.6–4.47)
LYMPHOCYTES NFR BLD AUTO: 38 % (ref 14–44)
MAGNESIUM SERPL-MCNC: 2.5 MG/DL (ref 1.6–2.6)
MCH RBC QN AUTO: 32 PG (ref 26.8–34.3)
MCHC RBC AUTO-ENTMCNC: 29.4 G/DL (ref 31.4–37.4)
MCV RBC AUTO: 109 FL (ref 82–98)
MONOCYTES # BLD AUTO: 0.33 THOUSAND/ΜL (ref 0.17–1.22)
MONOCYTES NFR BLD AUTO: 6 % (ref 4–12)
NEUTROPHILS # BLD AUTO: 2.71 THOUSANDS/ΜL (ref 1.85–7.62)
NEUTS SEG NFR BLD AUTO: 51 % (ref 43–75)
NRBC BLD AUTO-RTO: 0 /100 WBCS
PLATELET # BLD AUTO: 154 THOUSANDS/UL (ref 149–390)
PMV BLD AUTO: 9.6 FL (ref 8.9–12.7)
POTASSIUM SERPL-SCNC: 3.9 MMOL/L (ref 3.5–5.3)
RBC # BLD AUTO: 3.44 MILLION/UL (ref 3.81–5.12)
SODIUM SERPL-SCNC: 133 MMOL/L (ref 136–145)
WBC # BLD AUTO: 5.32 THOUSAND/UL (ref 4.31–10.16)

## 2019-08-18 PROCEDURE — 85025 COMPLETE CBC W/AUTO DIFF WBC: CPT | Performed by: INTERNAL MEDICINE

## 2019-08-18 PROCEDURE — 99232 SBSQ HOSP IP/OBS MODERATE 35: CPT | Performed by: INTERNAL MEDICINE

## 2019-08-18 PROCEDURE — 83735 ASSAY OF MAGNESIUM: CPT | Performed by: INTERNAL MEDICINE

## 2019-08-18 PROCEDURE — 80048 BASIC METABOLIC PNL TOTAL CA: CPT | Performed by: INTERNAL MEDICINE

## 2019-08-18 RX ORDER — LIDOCAINE 50 MG/G
2 PATCH TOPICAL DAILY
Status: DISCONTINUED | OUTPATIENT
Start: 2019-08-19 | End: 2019-08-20 | Stop reason: HOSPADM

## 2019-08-18 RX ORDER — LIDOCAINE 50 MG/G
1 PATCH TOPICAL DAILY
Status: DISCONTINUED | OUTPATIENT
Start: 2019-08-18 | End: 2019-08-18

## 2019-08-18 RX ORDER — LOSARTAN POTASSIUM 50 MG/1
50 TABLET ORAL DAILY
Status: DISCONTINUED | OUTPATIENT
Start: 2019-08-19 | End: 2019-08-19

## 2019-08-18 RX ADMIN — LOSARTAN POTASSIUM 50 MG: 50 TABLET ORAL at 08:51

## 2019-08-18 RX ADMIN — DOCUSATE SODIUM 100 MG: 100 CAPSULE, LIQUID FILLED ORAL at 08:51

## 2019-08-18 RX ADMIN — HEPARIN SODIUM 5000 UNITS: 5000 INJECTION INTRAVENOUS; SUBCUTANEOUS at 21:34

## 2019-08-18 RX ADMIN — NYSTATIN 1 APPLICATION: 100000 POWDER TOPICAL at 21:35

## 2019-08-18 RX ADMIN — METOPROLOL TARTRATE 25 MG: 25 TABLET, FILM COATED ORAL at 08:52

## 2019-08-18 RX ADMIN — GABAPENTIN 600 MG: 300 CAPSULE ORAL at 21:35

## 2019-08-18 RX ADMIN — NYSTATIN 1 APPLICATION: 100000 POWDER TOPICAL at 08:56

## 2019-08-18 RX ADMIN — GABAPENTIN 600 MG: 300 CAPSULE ORAL at 08:52

## 2019-08-18 RX ADMIN — LIDOCAINE 1 PATCH: 50 PATCH CUTANEOUS at 15:17

## 2019-08-18 RX ADMIN — Medication 1000 MG: at 08:51

## 2019-08-18 RX ADMIN — HYDROXYCHLOROQUINE SULFATE 200 MG: 200 TABLET, FILM COATED ORAL at 08:54

## 2019-08-18 RX ADMIN — CALCIUM CARBONATE-VITAMIN D TAB 500 MG-200 UNIT 1 TABLET: 500-200 TAB at 08:50

## 2019-08-18 RX ADMIN — SERTRALINE HYDROCHLORIDE 25 MG: 25 TABLET ORAL at 21:34

## 2019-08-18 RX ADMIN — ALBUTEROL SULFATE 2 PUFF: 90 AEROSOL, METERED RESPIRATORY (INHALATION) at 22:54

## 2019-08-18 RX ADMIN — LIDOCAINE 2 PATCH: 50 PATCH CUTANEOUS at 17:49

## 2019-08-18 RX ADMIN — HEPARIN SODIUM 5000 UNITS: 5000 INJECTION INTRAVENOUS; SUBCUTANEOUS at 15:00

## 2019-08-18 RX ADMIN — NYSTATIN 1 APPLICATION: 100000 POWDER TOPICAL at 15:20

## 2019-08-18 RX ADMIN — POTASSIUM CHLORIDE 20 MEQ: 1500 TABLET, EXTENDED RELEASE ORAL at 08:52

## 2019-08-18 RX ADMIN — LEVOTHYROXINE SODIUM 112 MCG: 112 TABLET ORAL at 05:20

## 2019-08-18 RX ADMIN — HEPARIN SODIUM 5000 UNITS: 5000 INJECTION INTRAVENOUS; SUBCUTANEOUS at 05:20

## 2019-08-18 RX ADMIN — CALCIUM CARBONATE-VITAMIN D TAB 500 MG-200 UNIT 1 TABLET: 500-200 TAB at 15:21

## 2019-08-18 RX ADMIN — GABAPENTIN 600 MG: 300 CAPSULE ORAL at 15:19

## 2019-08-18 RX ADMIN — ACETAMINOPHEN 650 MG: 325 TABLET ORAL at 21:34

## 2019-08-18 RX ADMIN — METOPROLOL TARTRATE 25 MG: 25 TABLET, FILM COATED ORAL at 21:34

## 2019-08-18 RX ADMIN — Medication 1 TABLET: at 08:51

## 2019-08-18 RX ADMIN — AMLODIPINE BESYLATE 5 MG: 5 TABLET ORAL at 08:52

## 2019-08-18 NOTE — PROGRESS NOTES
Cardiology Progress Note - Fang Shipley 80 y o  female MRN: [id number]    Unit/Bed#: [facility] 507-01 Encounter: [id number]      Assessment:  Principal Problem:    Acute respiratory failure with hypoxia ([facility] [address] )  Active Problems:    Hypertension    Hypothyroidism    Acute on chronic diastolic heart failure (HCC)    Type 2 diabetes mellitus with diabetic polyneuropathy (HCC)    Rheumatoid arthritis involving multiple sites [facility])    History of aortic valve replacement with bioprosthetic valve    Elevated troponin    Acute kidney injury ([facility] [address] )    Dysphagia      Plan:    1  Acute on chronic diastolic CHF - She had a very good response to IV Lasix and is significantly improved from a volume standpoint  However, creatinine did rise and therefore we will hold IV Lasix today  Plan to start torsemide 40 mg daily tomorrow  Follow labs and urine output  Echocardiogram shows preserved LV function with grade 2 diastolic dysfunction      2  History of bioprosthetic aortic valve replacement - Echocardiogram shows normally functioning prosthetic valve  No other issues from this standpoint      3  Non MI troponin elevation - This is associated with her acute CHF and respiratory failure  No symptoms of angina and no ECG changes seen  Medical management as above  She will require eventually an updated ischemic evaluation as an outpatient  Subjective:   Patient seen and examined  No significant events overnight  Patient states he is feeling better  Less short of breath  Had a good overall response to IV Lasix  However creatinine did rise slightly overnight  Objective:     Vitals: Blood pressure 122/60, pulse (!) 54, temperature 98 5 °F (36 9 °C), resp   rate 18, height 4' 11" (1 499 m), weight 86 2 kg (190 lb 0 6 oz), SpO2 99 %, not currently breastfeeding , Body mass index is 38 38 kg/m² ,   Orthostatic Blood Pressures      Most Recent Value   Blood Pressure  122/60 filed at 08/18/2019 2944   Patient Position - Orthostatic VS  Sitting filed at 08/17/2019 1927            Intake/Output Summary (Last 24 hours) at 8/18/2019 0900  Last data filed at 8/18/2019 0253  Gross per 24 hour   Intake 950 ml   Output 918 ml   Net 32 ml       No significant arrhythmias seen on telemetry review  Physical Exam:    GEN: Lloyd Smart appears well, alert and oriented x 3, pleasant and cooperative   HEENT: pupils equal, round, and reactive to light; extraocular muscles intact  NECK: supple, no carotid bruits  Improved JVP   HEART: regular rhythm, normal S1 and S2, no murmurs, clicks, gallops or rubs   LUNGS:  Diminished at bases with basilar rales that do clear bilaterally; no wheezes or rhonchi   ABDOMEN: normal bowel sounds, soft, no tenderness, no distention  EXTREMITIES: peripheral pulses normal; no clubbing, cyanosis   ++ edema bilaterally  NEURO: no focal findings   SKIN: normal without suspicious lesions on exposed skin      Medications:      Current Facility-Administered Medications:     acetaminophen (TYLENOL) tablet 650 mg, 650 mg, Oral, Q6H PRN, Willie Mcdowellgel, DO, 650 mg at 08/17/19 2007    albuterol (PROVENTIL HFA,VENTOLIN HFA) inhaler 2 puff, 2 puff, Inhalation, Q4H PRN, Willie Clemens, DO, 2 puff at 08/16/19 1541    amLODIPine (NORVASC) tablet 5 mg, 5 mg, Oral, QAM, Willie Clemens, DO, 5 mg at 08/18/19 0792    calcium carbonate-vitamin D (OSCAL-D) 500 mg-200 units per tablet 1 tablet, 1 tablet, Oral, BID With Meals, Willie Clemens, DO, 1 tablet at 08/18/19 0850    docusate sodium (COLACE) capsule 100 mg, 100 mg, Oral, Daily, Willie Mcdowellgel, DO, 100 mg at 08/18/19 0851    fish oil capsule 1,000 mg, 1,000 mg, Oral, Daily, Willie Mcdowellgel, DO, 1,000 mg at 08/18/19 0851    furosemide (LASIX) injection 80 mg, 80 mg, Intravenous, BID, Luanne Edwards MD, 80 mg at 08/18/19 0853    gabapentin (NEURONTIN) capsule 600 mg, 600 mg, Oral, TID, Gt Davila DO, 600 mg at 08/18/19 0845    heparin (porcine) subcutaneous injection 5,000 Units, 5,000 Units, Subcutaneous, Q8H [facility], Sherwin Rincon MD, 5,000 Units at 08/18/19 0520    hydroxychloroquine (PLAQUENIL) tablet 200 mg, 200 mg, Oral, Daily, Meg Lous, DO, 200 mg at 08/18/19 0854    levothyroxine tablet 112 mcg, 112 mcg, Oral, Early Morning, Meg Lous, DO, 112 mcg at 08/18/19 0520    losartan (COZAAR) tablet 50 mg, 50 mg, Oral, Daily, Sherwin Rincon MD, 50 mg at 08/18/19 5793    methocarbamol (ROBAXIN) tablet 500 mg, 500 mg, Oral, TID PRN, Meg Lous, DO, 500 mg at 08/17/19 0324    metoprolol tartrate (LOPRESSOR) tablet 25 mg, 25 mg, Oral, BID, Meg Lous, DO, 25 mg at 08/18/19 6360    multivitamin-minerals (CENTRUM) tablet 1 tablet, 1 tablet, Oral, Daily, Meg Thompson, DO, 1 tablet at 08/18/19 0851    nitroglycerin (NITROSTAT) SL tablet 0 4 mg, 0 4 mg, Sublingual, Q5 Min PRN, Meg Lous, DO    nystatin (MYCOSTATIN) powder 1 application, 1 application, Topical, TID, Gaile Pearl, DO, 1 application at 78/26/44 0856    potassium chloride (K-DUR,KLOR-CON) CR tablet 20 mEq, 20 mEq, Oral, Daily, Sherwin Rincon MD, 20 mEq at 08/18/19 6725    sertraline (ZOLOFT) tablet 25 mg, 25 mg, Oral, HS, Meg Lous, DO, 25 mg at 08/17/19 2115     Labs & Results:    Results from last 7 days   Lab Units 08/16/19  0721 08/16/19  0439 08/16/19  0053   TROPONIN I ng/mL 1 22* 1 24* 0 99*     Results from last 7 days   Lab Units 08/18/19  0538 08/17/19  0559 08/16/19  0439   WBC Thousand/uL 5 32 6 73 8 05   HEMOGLOBIN g/dL 11 0* 9 9* 10 1*   HEMATOCRIT % 37 4 32 6* 31 7*   PLATELETS Thousands/uL 154 169 172     Results from last 7 days   Lab Units 08/16/19  0439   TRIGLYCERIDES mg/dL 99   HDL mg/dL 56     Results from last 7 days   Lab Units 08/18/19  0531 08/17/19  0559 08/16/19  0439 08/16/19  0053   POTASSIUM mmol/L 3 9 3 9 4 2 5 0   CHLORIDE mmol/L 100 99* 102 103   CO2 mmol/L 22 28 27 26   BUN mg/dL 41* 33* 33* 33*   CREATININE mg/dL 1 59* 1 28 1 28 1 35*   CALCIUM mg/dL 9 5 9 2 9 1 9 5   ALK PHOS U/L  --   --   --  68   ALT U/L  --   --   --  20   AST U/L  --   --   --  37     Results from last 7 days   Lab Units 08/16/19  0928 08/16/19  0214   INR   --  1 15   PTT seconds 75* 173*     Results from last 7 days   Lab Units 08/18/19  0531 08/16/19  0439   MAGNESIUM mg/dL 2 5 2 1         EKG personally reviewed by Vita Trinh MD   Sinus rhythm    ECHO:  LEFT VENTRICLE:  Systolic function was normal by visual assessment  Ejection fraction was estimated to be 55 %  There was moderate hypokinesis of the mid anteroseptal wall(s)  Features were consistent with a pseudonormal left ventricular filling pattern, with concomitant abnormal relaxation and increased filling pressure (grade 2 diastolic dysfunction)      RIGHT VENTRICLE:  Systolic pressure was mildly increased  Estimated peak pressure was 40 mmHg      LEFT ATRIUM:  The atrium was mildly dilated      MITRAL VALVE:  There was mild to moderate annular calcification  There was mild regurgitation      AORTIC VALVE:  A bioprosthesis was present  It exhibited normal function      TRICUSPID VALVE:  There was mild regurgitation      PULMONIC VALVE:  There was trace regurgitation  Counseling / Coordination of Care  Total floor / unit time spent today 25 minutes  Greater than 50% of total time was spent with the patient and / or family counseling and / or coordination of care

## 2019-08-18 NOTE — UTILIZATION REVIEW
Initial Clinical Review    Admission: Date/Time/Statement: 8/16/19 @ 0240     Orders Placed This Encounter   Procedures    Inpatient Admission (expected length of stay for this patient Order details is greater than two midnights)     Standing Status:   Standing     Number of Occurrences:   1     Order Specific Question:   Admitting Physician     Answer:   Aniceto Pereyra [[id number]]     Order Specific Question:   Level of Care     Answer:   Med Surg [[id number]]     Order Specific Question:   Estimated length of stay     Answer:   More than 2 Midnights     Order Specific Question:   Certification     Answer:   I certify that inpatient services are medically necessary for this patient for a duration of greater than two midnights  See H&P and MD Progress Notes for additional information about the patient's course of treatment  ED Arrival Information     Expected Arrival Acuity Means of Arrival Escorted By Service Admission Type    - 8/16/2019 00:33 Emergent Ambulance [facility] Hospitalist Elective    Arrival Complaint    SOB        Chief Complaint   Patient presents with    Shortness of Breath     Patient reports SOB worsening over the past two days  Reports wheezing beginning tonight  Assessment/Plan: 81 y/o female with PMHx CHF, aortic stenosis s/p bioprosthetic AVR replacement, type 2 DM, HTN, hypothyroidism, rheumatoid arthritis presents to ED with c/o worsening sob even with rest x 2 days  SOB a/w wheezing and unable to lie flat  (+) leg swelling  In ED significant respiratory distress noted and Bipap initiated & IV nitro given  Trop 0 99, CXR revealed evidence of pulmonary vascular congestion   Hep gtt, brilanta and asa, IV lasix given       On exam:  decreased breath sounds b/l with b/l apical expiratory wheezing and bibasilar rales  Dx:  Acute respiratory failure with hypoxia / Acute heart failure / possible NSTEMI  Admit to Step-down unit level 2 under inpatient status, O2 as needed --- pt weaned to O2 3 lpm nc, but respiratory distress returned  Bipap re-initiated, with improvement in respiratory status  Serial troponins, repeat EKG, I/O's, daily wts, hold nephrotoxins, monitor labs, cardiology consult    Cardiology consult 8/16 --    1  Acute on chronic diastolic CHF - Patient arrived volume overloaded acute respiratory failure  She is improving with IV Lasix  Given her significant lower extremity edema we will increase this to 80 mg twice daily  Continue to follow urine output, daily labs and weight  Check an updated echocardiogram to look for any changes in LV function      2  History of bioprosthetic aortic valve replacement - get an updated echocardiogram        3  Non MI troponin elevation - a/w her acute CHF and respiratory failure  No symptoms of angina and no ECG changes seen  Medical management as above  Can stop IV heparin  8/17 --- pt still with sob, but improving  Continue IV lasix, monitor I/O's, daily wts, O2 as needed currently on room air        Intake/Output Summary (Last 24 hours) at 8/17/2019 0914  Last data filed at 8/17/2019 0806      Gross per 24 hour   Intake 856 17 ml   Output 4758 ml   Net -3901 83 ml         ED Triage Vitals   Temperature Pulse Respirations Blood Pressure SpO2   08/16/19 0059 08/16/19 0045 08/16/19 0045 08/16/19 0045 08/16/19 0045   99 7 °F (37 6 °C) 88 (!) 32 (!) 185/86 (!) 87 %      Temp Source Heart Rate Source Patient Position - Orthostatic VS BP Location FiO2 (%)   08/16/19 0059 08/16/19 0045 08/16/19 0045 08/16/19 0045 --   Axillary Monitor Sitting Right arm       Pain Score       08/16/19 0045       No Pain        Wt Readings from Last 1 Encounters:   08/18/19 86 2 kg (190 lb 0 6 oz)            08/17/19 0621  101 kg (222 lb 3 6 oz)     08/16/19 0359  101 kg (223 lb 1 7 oz)     08/16/19 0045  90 7 kg (200 lb)  4' 11" (1 499 m)       Additional Vital Signs:   Date/Time  Temp  Pulse  Resp  BP  SpO2  O2 Device   08/17/19 1600            None (Room air)   08/17/19 1537  97 9 °F (36 6 °C)  62  26Abnormal   117/56  94 %  None (Room air)   08/17/19 1241  98 °F (36 7 °C)  61  16  115/51  98 %  Nasal cannula   08/17/19 1200          94 %  None (Room air)   08/17/19 0806  98 4 °F (36 9 °C)  62  16  145/66  99 %     08/17/19 0800            Nasal cannula   08/17/19 0300  98 °F (36 7 °C)  64  18  140/66  98 %     08/16/19 2300  98 8 °F (37 1 °C)  62  18  139/60  99 %     08/16/19 1100          100 %  Nasal cannula   08/16/19 0958    66    151/70       08/16/19 0702  98 3 °F (36 8 °C)  66  20  151/67  100 %      08/16/19 0416    66      100 %     08/16/19 0300  98 5 °F (36 9 °C)  61  18  173/72Abnormal   98 %     08/16/19 0215    68  24Abnormal   142/65  95 %  Nasal cannula   08/16/19 0115    76  28Abnormal   105/55  99 %      08/16/19 0103        112/63       08/16/19 0059  99 7 °F (37 6 °C)             08/16/19 0048          95 %  Nasal cannula   08/16/19 0045    88  32Abnormal   185/86Abnormal   87 %Abnormal          Pertinent Labs/Diagnostic Test Results:   Results from last 7 days   Lab Units 08/17/19  0559 08/16/19  0439 08/16/19  0214 08/16/19  0053   WBC Thousand/uL 6 73 8 05 8 16 10 13   HEMOGLOBIN g/dL 9 9* 10 1* 10 2* 11 5   HEMATOCRIT % 32 6* 31 7* 32 5* 36 3   PLATELETS Thousands/uL 169 172 170 196   NEUTROS ABS Thousands/µL  --   --   --  6 45     Results from last 7 days   Lab Units 08/17/19  0559 08/16/19  0439 08/16/19  0053   SODIUM mmol/L 134* 137 137   POTASSIUM mmol/L 3 9 4 2 5 0   CHLORIDE mmol/L 99* 102 103   CO2 mmol/L 28 27 26   ANION GAP mmol/L 7 8 8   BUN mg/dL 33* 33* 33*   CREATININE mg/dL 1 28 1 28 1 35*   EGFR ml/min/1 73sq m 38 38 36   CALCIUM mg/dL 9 2 9 1 9 5   MAGNESIUM mg/dL  --  2 1  --      Results from last 7 days   Lab Units 08/16/19  0053   AST U/L 37   ALT U/L 20   ALK PHOS U/L 68   TOTAL PROTEIN g/dL 8 1   ALBUMIN g/dL 4 0   TOTAL BILIRUBIN mg/dL 0 54     Results from last 7 days   Lab Units 08/17/19  1056 08/17/19  0711 08/16/19  2104 08/16/19  1648 08/16/19  1328 08/16/19  0558   POC GLUCOSE mg/dl 122 144* 115 135 158* 147*     Results from last 7 days   Lab Units 08/17/19  0559 08/16/19  0439 08/16/19  0053   GLUCOSE RANDOM mg/dL 112 142* 167*     Results from last 7 days   Lab Units 08/16/19  0439   HEMOGLOBIN A1C % 5 0   EAG mg/dl 97     Results from last 7 days   Lab Units 08/16/19  0721 08/16/19  0439 08/16/19  0053   TROPONIN I ng/mL 1 22* 1 24* 0 99*     Results from last 7 days   Lab Units 08/16/19  0928 08/16/19  0214   PROTIME seconds  --  14 3   INR   --  1 15   PTT seconds 75* 173*     Results from last 7 days   Lab Units 08/16/19  0053   NT-PRO BNP pg/mL 17,249*     CXR 8/16 - Mild central vascular congestion in keeping with CHF  Probable small pleural effusions      EKG 8/16 -- Normal sinus rhythm  Incomplete right bundle branch block  Septal infarct (cited on or before 23-SEP-2016)  Abnormal ECG    ED Treatment:   Medication Administration from 08/16/2019 0033 to 08/16/2019 0330       Date/Time Order Dose Route Action     08/16/2019 0052 nitroglycerin (NITROSTAT) SL tablet 0 8 mg 0 8 mg Sublingual Given     08/16/2019 0050 nitroGLYcerin (TRIDIL) 50 mg in 250 mL infusion (premix) 100 mcg/min Intravenous New Bag     08/16/2019 0101 nitroGLYcerin (TRIDIL) 50 mg in 250 mL infusion (premix) 100 mcg/min Intravenous New Bag     08/16/2019 0151 aspirin chewable tablet 324 mg 324 mg Oral Given     08/16/2019 0152 ticagrelor (BRILINTA) tablet 180 mg 180 mg Oral Given     08/16/2019 0139 heparin (porcine) injection 4,000 Units 4,000 Units Intravenous Given     08/16/2019 0143 heparin (porcine) 25,000 units in 250 mL infusion (premix) 11 1 Units/kg/hr Intravenous New Bag     08/16/2019 0307 furosemide (LASIX) injection 40 mg 40 mg Intravenous Given     Past Medical History:   Diagnosis Date    Arthritis     [facility] [address] )     Cardiac disease     aortic valve transplant    [facility]est[facility])     Compression fracture of body of thoracic vertebra ([facility])     CVA (cerebral vascular accident) ([place] [address] )     Diabetes mellitus ([place] [address] )     Disease of thyroid gland     Fibromyalgia, primary     Hyperlipidemia     Hypertension     Neuropathy     Pressure injury of skin     Renal disorder     kidney stent    Stroke [facility])      Present on Admission:   Acute on chronic diastolic heart failure ([facility])   Elevated troponin   Hypertension   Hypothyroidism   Rheumatoid arthritis involving multiple sites ([place] [address] )   Type 2 diabetes mellitus with diabetic polyneuropathy ([facility])   Acute respiratory failure with hypoxia ([facility])   Acute kidney injury ([place] [address] )   Dysphagia      Admitting Diagnosis: SOB (shortness of breath) [R06 02]  NSTEMI (non-ST elevated myocardial infarction) ([facility]) [I21 4]  CHF exacerbation ([facility]) [I50 9]  HERLINDA (acute kidney injury) ([place] [address] ) [N17 9]  Age/Sex: 80 y o  female  Admission Orders:  Tele  Cardiac diet, 2 gm sodium  SCD's  I/O  Daily wts  Up with assist    Current Facility-Administered Medications:  furosemide (LASIX) injection 40 mg   Dose: 40 mg  Freq: Once Route: IV  Start: 08/16/19 0245 End: 08/16/19 0307      furosemide (LASIX) injection 40 mg   Dose: 40 mg  Freq:  Once Route: IV  Start: 08/16/19 0130 End: 08/16/19 0122      furosemide (LASIX) injection 80 mg   Dose: 80 mg  Freq: 2 times daily Route: IV  Start: 08/16/19 1800 End: 08/18/19 0910          acetaminophen 650 mg Oral Q6H PRN  8/16 x3, 8/17 x2   albuterol 2 puff Inhalation Q4H PRN  8/16 x1   calcium carbonate-vitamin D 1 tablet Oral BID With Meals   docusate sodium 100 mg Oral Daily   fish oil 1,000 mg Oral Daily   gabapentin 600 mg Oral TID   heparin (porcine) 5,000 Units Subcutaneous Q8H [facility]   hydroxychloroquine 200 mg Oral Daily   levothyroxine 112 mcg Oral Early Morning   [START ON 8/19/2019] losartan 50 mg Oral Daily   methocarbamol 500 mg Oral TID PRN  8/16 x1, 8/17 x1   metoprolol tartrate 25 mg Oral BID   multivitamin-minerals 1 tablet Oral Daily   nitroglycerin 0 4 mg Sublingual Q5 Min PRN   nystatin 1 application Topical TID   potassium chloride 20 mEq Oral Daily   sertraline 25 mg Oral HS       IP CONSULT TO CASE MANAGEMENT  IP CONSULT TO CARDIOLOGY  IP CONSULT TO OB GYN    Network Utilization Review Department  Phone: [phone]; Fax [phone]  [email or web address]  ATTENTION: Please call with any questions or concerns to [phone]  and carefully listen to the prompts so that you are directed to the right person  Send all requests for admission clinical reviews, approved or denied determinations and any other requests to fax [phone]   All voicemails are confidential

## 2019-08-18 NOTE — PLAN OF CARE
Problem: Prexisting or High Potential for Compromised Skin Integrity  Goal: Skin integrity is maintained or improved  Description  INTERVENTIONS:  - Identify patients at risk for skin breakdown  - Assess and monitor skin integrity  - Assess and monitor nutrition and hydration status  - Monitor labs   - Assess for incontinence   - Turn and reposition patient  - Assist with mobility/ambulation  - Relieve pressure over bony prominences  - Avoid friction and shearing  - Provide appropriate hygiene as needed including keeping skin clean and dry  - Evaluate need for skin moisturizer/barrier cream  - Collaborate with interdisciplinary team   - Patient/family teaching  - Consider wound care consult   Outcome: Progressing     Problem: Nutrition/Hydration-ADULT  Goal: Nutrient/Hydration intake appropriate for improving, restoring or maintaining nutritional needs  Description  Monitor and assess patient's nutrition/hydration status for malnutrition  Collaborate with interdisciplinary team and initiate plan and interventions as ordered  Monitor patient's weight and dietary intake as ordered or per policy  Utilize nutrition screening tool and intervene as necessary  Determine patient's food preferences and provide high-protein, high-caloric foods as appropriate       INTERVENTIONS:  - Monitor oral intake, urinary output, labs, and treatment plans  - Assess nutrition and hydration status and recommend course of action  - Evaluate amount of meals eaten  - Assist patient with eating if necessary   - Allow adequate time for meals  - Recommend/ encourage appropriate diets, oral nutritional supplements, and vitamin/mineral supplements  - Order, calculate, and assess calorie counts as needed  - Recommend, monitor, and adjust tube feedings and TPN/PPN based on assessed needs  - Assess need for intravenous fluids  - Provide specific nutrition/hydration education as appropriate  - Include patient/family/caregiver in decisions related to nutrition  Outcome: Progressing     Problem: Potential for Falls  Goal: Patient will remain free of falls  Description  INTERVENTIONS:  - Assess patient frequently for physical needs  -  Identify cognitive and physical deficits and behaviors that affect risk of falls    -  Golden fall precautions as indicated by assessment   - Educate patient/family on patient safety including physical limitations  - Instruct patient to call for assistance with activity based on assessment  - Modify environment to reduce risk of injury  - Consider OT/PT consult to assist with strengthening/mobility  Outcome: Progressing     Problem: CARDIOVASCULAR - ADULT  Goal: Maintains optimal cardiac output and hemodynamic stability  Description  INTERVENTIONS:  - Monitor I/O, vital signs and rhythm  - Monitor for S/S and trends of decreased cardiac output  - Administer and titrate ordered vasoactive medications to optimize hemodynamic stability  - Assess quality of pulses, skin color and temperature  - Assess for signs of decreased coronary artery perfusion  - Instruct patient to report change in severity of symptoms  Outcome: Progressing  Goal: Absence of cardiac dysrhythmias or at baseline rhythm  Description  INTERVENTIONS:  - Continuous cardiac monitoring, vital signs, obtain 12 lead EKG if ordered  - Administer antiarrhythmic and heart rate control medications as ordered  - Monitor electrolytes and administer replacement therapy as ordered  Outcome: Progressing     Problem: RESPIRATORY - ADULT  Goal: Achieves optimal ventilation and oxygenation  Description  INTERVENTIONS:  - Assess for changes in respiratory status  - Assess for changes in mentation and behavior  - Position to facilitate oxygenation and minimize respiratory effort  - Oxygen administered by appropriate delivery if ordered  - Initiate smoking cessation education as indicated  - Encourage broncho-pulmonary hygiene including cough, deep breathe, Incentive Spirometry  - Assess the need for suctioning and aspirate as needed  - Assess and instruct to report SOB or any respiratory difficulty  - Respiratory Therapy support as indicated  Outcome: Progressing     Problem: SKIN/TISSUE INTEGRITY - ADULT  Goal: Skin integrity remains intact  Description  INTERVENTIONS  - Identify patients at risk for skin breakdown  - Assess and monitor skin integrity  - Assess and monitor nutrition and hydration status  - Monitor labs (i e  albumin)  - Assess for incontinence   - Turn and reposition patient  - Assist with mobility/ambulation  - Relieve pressure over bony prominences  - Avoid friction and shearing  - Provide appropriate hygiene as needed including keeping skin clean and dry  - Evaluate need for skin moisturizer/barrier cream  - Collaborate with interdisciplinary team (i e  Nutrition, Rehabilitation, etc )   - Patient/family teaching  Outcome: Progressing  Goal: Incision(s), wounds(s) or drain site(s) healing without S/S of infection  Description  INTERVENTIONS  - Assess and document risk factors for skin impairment   - Assess and document dressing, incision, wound bed, drain sites and surrounding tissue  - Consider nutrition services referral as needed  - Oral mucous membranes remain intact  - Provide patient/ family education  Outcome: Progressing

## 2019-08-19 LAB
ALBUMIN SERPL BCP-MCNC: 3.3 G/DL (ref 3.5–5)
ALP SERPL-CCNC: 55 U/L (ref 46–116)
ALT SERPL W P-5'-P-CCNC: 15 U/L (ref 12–78)
ANION GAP SERPL CALCULATED.3IONS-SCNC: 8 MMOL/L (ref 4–13)
AST SERPL W P-5'-P-CCNC: 17 U/L (ref 5–45)
BASOPHILS # BLD AUTO: 0.05 THOUSANDS/ΜL (ref 0–0.1)
BASOPHILS NFR BLD AUTO: 1 % (ref 0–1)
BILIRUB SERPL-MCNC: 0.35 MG/DL (ref 0.2–1)
BUN SERPL-MCNC: 40 MG/DL (ref 5–25)
CALCIUM SERPL-MCNC: 9.3 MG/DL (ref 8.3–10.1)
CHLORIDE SERPL-SCNC: 98 MMOL/L (ref 100–108)
CO2 SERPL-SCNC: 32 MMOL/L (ref 21–32)
CREAT SERPL-MCNC: 1.62 MG/DL (ref 0.6–1.3)
EOSINOPHIL # BLD AUTO: 0.37 THOUSAND/ΜL (ref 0–0.61)
EOSINOPHIL NFR BLD AUTO: 7 % (ref 0–6)
ERYTHROCYTE [DISTWIDTH] IN BLOOD BY AUTOMATED COUNT: 13.2 % (ref 11.6–15.1)
GFR SERPL CREATININE-BSD FRML MDRD: 29 ML/MIN/1.73SQ M
GLUCOSE SERPL-MCNC: 91 MG/DL (ref 65–140)
HCT VFR BLD AUTO: 32.7 % (ref 34.8–46.1)
HGB BLD-MCNC: 10.4 G/DL (ref 11.5–15.4)
IMM GRANULOCYTES # BLD AUTO: 0.02 THOUSAND/UL (ref 0–0.2)
IMM GRANULOCYTES NFR BLD AUTO: 0 % (ref 0–2)
LYMPHOCYTES # BLD AUTO: 2.32 THOUSANDS/ΜL (ref 0.6–4.47)
LYMPHOCYTES NFR BLD AUTO: 41 % (ref 14–44)
MCH RBC QN AUTO: 32.3 PG (ref 26.8–34.3)
MCHC RBC AUTO-ENTMCNC: 31.8 G/DL (ref 31.4–37.4)
MCV RBC AUTO: 102 FL (ref 82–98)
MONOCYTES # BLD AUTO: 0.38 THOUSAND/ΜL (ref 0.17–1.22)
MONOCYTES NFR BLD AUTO: 7 % (ref 4–12)
NEUTROPHILS # BLD AUTO: 2.47 THOUSANDS/ΜL (ref 1.85–7.62)
NEUTS SEG NFR BLD AUTO: 44 % (ref 43–75)
NRBC BLD AUTO-RTO: 0 /100 WBCS
PLATELET # BLD AUTO: 194 THOUSANDS/UL (ref 149–390)
PMV BLD AUTO: 9.5 FL (ref 8.9–12.7)
POTASSIUM SERPL-SCNC: 3.7 MMOL/L (ref 3.5–5.3)
PROT SERPL-MCNC: 7 G/DL (ref 6.4–8.2)
RBC # BLD AUTO: 3.22 MILLION/UL (ref 3.81–5.12)
SODIUM SERPL-SCNC: 138 MMOL/L (ref 136–145)
WBC # BLD AUTO: 5.61 THOUSAND/UL (ref 4.31–10.16)

## 2019-08-19 PROCEDURE — 99232 SBSQ HOSP IP/OBS MODERATE 35: CPT | Performed by: INTERNAL MEDICINE

## 2019-08-19 PROCEDURE — 92526 ORAL FUNCTION THERAPY: CPT

## 2019-08-19 PROCEDURE — NC001 PR NO CHARGE: Performed by: INTERNAL MEDICINE

## 2019-08-19 PROCEDURE — 85025 COMPLETE CBC W/AUTO DIFF WBC: CPT | Performed by: INTERNAL MEDICINE

## 2019-08-19 PROCEDURE — 80053 COMPREHEN METABOLIC PANEL: CPT | Performed by: INTERNAL MEDICINE

## 2019-08-19 RX ORDER — TORSEMIDE 20 MG/1
40 TABLET ORAL DAILY
Status: DISCONTINUED | OUTPATIENT
Start: 2019-08-20 | End: 2019-08-20 | Stop reason: HOSPADM

## 2019-08-19 RX ORDER — AMLODIPINE BESYLATE 5 MG/1
5 TABLET ORAL DAILY
Status: DISCONTINUED | OUTPATIENT
Start: 2019-08-20 | End: 2019-08-20 | Stop reason: HOSPADM

## 2019-08-19 RX ADMIN — ACETAMINOPHEN 650 MG: 325 TABLET ORAL at 21:43

## 2019-08-19 RX ADMIN — ACETAMINOPHEN 650 MG: 325 TABLET ORAL at 12:14

## 2019-08-19 RX ADMIN — CALCIUM CARBONATE-VITAMIN D TAB 500 MG-200 UNIT 1 TABLET: 500-200 TAB at 17:27

## 2019-08-19 RX ADMIN — GABAPENTIN 600 MG: 300 CAPSULE ORAL at 21:35

## 2019-08-19 RX ADMIN — SERTRALINE HYDROCHLORIDE 25 MG: 25 TABLET ORAL at 21:34

## 2019-08-19 RX ADMIN — METOPROLOL TARTRATE 25 MG: 25 TABLET, FILM COATED ORAL at 09:49

## 2019-08-19 RX ADMIN — LIDOCAINE 2 PATCH: 50 PATCH CUTANEOUS at 09:51

## 2019-08-19 RX ADMIN — HEPARIN SODIUM 5000 UNITS: 5000 INJECTION INTRAVENOUS; SUBCUTANEOUS at 14:16

## 2019-08-19 RX ADMIN — NYSTATIN 1 APPLICATION: 100000 POWDER TOPICAL at 12:09

## 2019-08-19 RX ADMIN — CALCIUM CARBONATE-VITAMIN D TAB 500 MG-200 UNIT 1 TABLET: 500-200 TAB at 09:49

## 2019-08-19 RX ADMIN — HYDROXYCHLOROQUINE SULFATE 200 MG: 200 TABLET, FILM COATED ORAL at 09:52

## 2019-08-19 RX ADMIN — HEPARIN SODIUM 5000 UNITS: 5000 INJECTION INTRAVENOUS; SUBCUTANEOUS at 05:00

## 2019-08-19 RX ADMIN — NYSTATIN 1 APPLICATION: 100000 POWDER TOPICAL at 21:35

## 2019-08-19 RX ADMIN — POTASSIUM CHLORIDE 20 MEQ: 1500 TABLET, EXTENDED RELEASE ORAL at 09:50

## 2019-08-19 RX ADMIN — Medication 1000 MG: at 09:49

## 2019-08-19 RX ADMIN — GABAPENTIN 600 MG: 300 CAPSULE ORAL at 09:50

## 2019-08-19 RX ADMIN — GABAPENTIN 600 MG: 300 CAPSULE ORAL at 17:27

## 2019-08-19 RX ADMIN — HEPARIN SODIUM 5000 UNITS: 5000 INJECTION INTRAVENOUS; SUBCUTANEOUS at 21:35

## 2019-08-19 RX ADMIN — LOSARTAN POTASSIUM 50 MG: 50 TABLET ORAL at 09:49

## 2019-08-19 RX ADMIN — LEVOTHYROXINE SODIUM 112 MCG: 112 TABLET ORAL at 05:00

## 2019-08-19 RX ADMIN — Medication 1 TABLET: at 09:49

## 2019-08-19 RX ADMIN — METOPROLOL TARTRATE 25 MG: 25 TABLET, FILM COATED ORAL at 21:34

## 2019-08-19 NOTE — ASSESSMENT & PLAN NOTE
· Continue Lopressor 25 mg b i d  · Stop losartan given increasing creatinine  · Restart amlodipine 5 mg    · Continue to monitor

## 2019-08-19 NOTE — PROGRESS NOTES
Progress Note - Tri Zheng 1934, 80 y o  female MRN: [id number]    Unit/Bed#: [facility] 507-01 Encounter: [id number]    Primary Care Provider: Vito Meneses DO   Date and time admitted to hospital: 8/16/2019 12:33 AM        * Acute on chronic diastolic heart failure [facility])  Assessment & Plan  Wt Readings from Last 3 Encounters:   08/19/19 87 1 kg (192 lb 0 3 oz)   06/25/19 88 9 kg (196 lb)   05/01/19 88 9 kg (196 lb)     · Appreciate Cardiology recommendations  Repeat 2D echocardiogram shows preserved EF with grade 2 diastolic dysfunction  Patient has history of bioprosthetic aortic valve which appears to be normally functioning  · Patient is responding well to IV diuretics  She is on room air  · IV Lasix 80 mg b i d  On hold yesterday because of worsening creatinine  · Replete lytes as needed  · Telemetry monitoring  · Fluid and sodium restriction  · As per Cardiology note yesterday, plan was to switch her to p o  Torsemide  Waiting for attending to evaluate the patient  · Will monitor tonight  Repeat BMP tomorrow  Essential hypertension  Assessment & Plan  · Continue Lopressor 25 mg b i d  · Stop losartan given increasing creatinine  · Restart amlodipine 5 mg  · Continue to monitor    Acute kidney injury [facility])  Assessment & Plan    Results from last 7 days   Lab Units 08/19/19  0553 08/18/19  0531 08/17/19  0559 08/16/19  0439 08/16/19  0053   BUN mg/dL 40* 41* 33* 33* 33*   CREATININE mg/dL 1 62* 1 59* 1 28 1 28 1 35*   EGFR ml/min/1 73sq m 29 29 38 38 36   ·     Creatinine slowly worsening  1 6 today  Baseline around 1 2-1 3  Diuresis currently on hold  Recheck BMP tomorrow  Acute respiratory failure with hypoxia (HCC)  Assessment & Plan  · Secondary to CHF  · Resolved for now  · Patient on room air this morning  · PT OT evaluation and ambulatory pulse ox      Elevated troponin  Assessment & Plan  · Non MI troponin elevation 2" CHF  · No further intervention    Results from last 7 days   Lab Units 19  0721 19  0439 19  0053   TROPONIN I ng/mL 1 22* 1 24* 0 99*       History of aortic valve replacement with bioprosthetic valve  Assessment & Plan  · Repeat 2D echocardiogram this admission shows normal functioning bioprosthetic valve  No further intervention indicated at this time    Rheumatoid arthritis involving multiple sites [facility])  Assessment & Plan  · Continue hydroxychloroquine  Stable    Type 2 diabetes mellitus with diabetic polyneuropathy [facility])  Assessment & Plan  Lab Results   Component Value Date    HGBA1C 5 0 2019     Recent Labs     19  1648 19  2104 19  0711 19  1056   POCGLU 135 115 144* 122     · Controlled currently  A1c is only 5  Will discontinue insulin sliding scale and blood glucose checks  · No T2DM for all practical purposes        VTE Pharmacologic Prophylaxis:   Pharmacologic: Heparin  Mechanical VTE Prophylaxis in Place: Yes    Patient Centered Rounds: I have performed bedside rounds with nursing staff today  Discussions with Specialists or Other Care Team Provider:     Education and Discussions with Family / Patient: pt    Time Spent for Care: 30 minutes  More than 50% of total time spent on counseling and coordination of care as described above  Current Length of Stay: 3 day(s)    Current Patient Status: Inpatient   Certification Statement: The patient will continue to require additional inpatient hospital stay due to above    Discharge Plan: in next 1-2 days once renal function stable and pt switched to po diuretics    Code Status: Level 3 - DNAR and DNI      Subjective:   Pt seen and examined by me this morning  Pt denies any specific complaints  Said she is feeling much better today  No shortness of breath or chest pain      Objective:     Vitals:   Temp (24hrs), Av 8 °F (36 6 °C), Min:97 5 °F (36 4 °C), Max:98 1 °F (36 7 °C)    Temp:  [97 5 °F (36 4 °C)-98 1 °F (36 7 °C)] 98 1 °F (36 7 °C)  HR: [60-70] 62  Resp:  [18-19] 19  BP: (119-161)/(57-72) 161/67  SpO2:  [93 %-97 %] 93 %  Body mass index is 38 78 kg/m²  Input and Output Summary (last 24 hours): Intake/Output Summary (Last 24 hours) at 8/19/2019 1453  Last data filed at 8/19/2019 1409  Gross per 24 hour   Intake 490 ml   Output 1386 ml   Net -896 ml       Physical Exam:     Physical Exam    Constitutional: Pt appears well-developed and well-nourished  Not in any acute distress  HENT:   Head: Normocephalic and atraumatic  Eyes: EOM are normal    Neck: Neck supple  Cardiovascular: Normal rate, regular rhythm, normal heart sounds  Exam reveals no gallop and no friction rub  No murmur heard  Pulmonary/Chest: Effort normal and breath sounds normal  No respiratory distress  Pt has no wheezes or rales  Abdominal: Soft  Non-distended, Non-tender  Bowel sounds are normal    Musculoskeletal: Normal range of motion  Neurological: alert and oriented to person, place, and time  moving extremities spontaneously  Psychiatric: normal mood and affect        Additional Data:     Labs:    Results from last 7 days   Lab Units 08/19/19  0553   WBC Thousand/uL 5 61   HEMOGLOBIN g/dL 10 4*   HEMATOCRIT % 32 7*   PLATELETS Thousands/uL 194   NEUTROS PCT % 44   LYMPHS PCT % 41   MONOS PCT % 7   EOS PCT % 7*     Results from last 7 days   Lab Units 08/19/19  0553   SODIUM mmol/L 138   POTASSIUM mmol/L 3 7   CHLORIDE mmol/L 98*   CO2 mmol/L 32   BUN mg/dL 40*   CREATININE mg/dL 1 62*   ANION GAP mmol/L 8   CALCIUM mg/dL 9 3   ALBUMIN g/dL 3 3*   TOTAL BILIRUBIN mg/dL 0 35   ALK PHOS U/L 55   ALT U/L 15   AST U/L 17   GLUCOSE RANDOM mg/dL 91     Results from last 7 days   Lab Units 08/16/19  0214   INR  1 15     Results from last 7 days   Lab Units 08/17/19  1056 08/17/19  0711 08/16/19  2104 08/16/19  1648 08/16/19  1328 08/16/19  0558   POC GLUCOSE mg/dl 122 144* 115 135 158* 147*     Results from last 7 days   Lab Units 08/16/19  0439   HEMOGLOBIN A1C % 5 0               * I Have Reviewed All Lab Data Listed Above  * Additional Pertinent Lab Tests Reviewed: [place] [address] Admission Reviewed    Imaging:    Imaging Reports Reviewed Today Include:   Imaging Personally Reviewed by Myself Includes:     Recent Cultures (last 7 days):           Last 24 Hours Medication List:     Current Facility-Administered Medications:  acetaminophen 650 mg Oral Q6H PRN Regan Danas, DO   albuterol 2 puff Inhalation Q4H PRN Regan Danas, DO   [START ON 8/20/2019] amLODIPine 5 mg Oral Daily Jak Aguirre MD   calcium carbonate-vitamin D 1 tablet Oral BID With Meals Regan Danas, DO   docusate sodium 100 mg Oral Daily Regan Danas, DO   fish oil 1,000 mg Oral Daily Regan Danas, DO   gabapentin 600 mg Oral TID Melani Thompson,    heparin (porcine) 5,000 Units Subcutaneous [facility] Bebe Alejandro MD   hydroxychloroquine 200 mg Oral Daily Regan Danas, DO   levothyroxine 112 mcg Oral Early Morning Regan Danas, DO   lidocaine 2 patch Topical Daily Nacho Mandel MD   methocarbamol 500 mg Oral TID PRN Regan Danas, DO   metoprolol tartrate 25 mg Oral BID Regan Danas, DO   multivitamin-minerals 1 tablet Oral Daily Regan Danas, DO   nitroglycerin 0 4 mg Sublingual Q5 Min PRN Regan Danas, DO   nystatin 1 application Topical TID Melani Thompson,    potassium chloride 20 mEq Oral Daily Bebe Alejandro MD   sertraline 25 mg Oral HS Regan Danas, DO        Today, Patient Was Seen By: Jak Aguirre MD    ** Please Note: Dictation voice to text software may have been used in the creation of this document   **

## 2019-08-19 NOTE — ASSESSMENT & PLAN NOTE
· Secondary to CHF  · Resolved for now  · Patient on room air this morning  · PT OT evaluation and ambulatory pulse ox

## 2019-08-19 NOTE — PROGRESS NOTES
Cardiology Progress Note - Aubrey Herzog 80 y o  female MRN: [id number]    Unit/Bed#: [facility] 507-01 Encounter: [id number]      Assessment:  1  Acute on chronic diastolic HF  · Echo - LVEF 55%, Grade 2 DD, RVSP 40, normally functioning AVR  2  Hypertension  3  HERLINDA  4  s/p bio-AVR  5  Diabetes Mellitus Type 2   6  Rheumatoid arthritis    Plan  · Exacerbation likely 2/2 eating out at restaurants frequently  · Appears euvolemic  · Cr stable, slightly above baseline at 1 62 today  · She feels her swelling is much better now, and her leg swelling has never looked better before  · Will start on torsemide 40 daily from tomorrow  · Can be discharged home from cardiac standpoint  · Check BMP in 2-3 days  · Counseled on low salt diet, weight management    Follow up outpatient with Dr Thompson in 1 week    Subjective:    No significant events overnight  Review of Systems  No c/o chest pain, No c/o palpitations, No c/o shortness of breath, No c/o dizziness or light-headedness, No c/o abdominal pain, no c/o nausea/vomitting  All other systems negative    Telemetry Review: Not on telemetry    Objective:   Vitals: Blood pressure 126/61, pulse 58, temperature 97 6 °F (36 4 °C), temperature source Oral, resp  rate 18, height 4' 11" (1 499 m), weight 87 1 kg (192 lb 0 3 oz), SpO2 97 %, not currently breastfeeding , Body mass index is 38 78 kg/m² ,   Orthostatic Blood Pressures      Most Recent Value   Blood Pressure  126/61 filed at 08/19/2019 1500   Patient Position - Orthostatic VS  Lying filed at 08/19/2019 0049         Systolic (66GWS), AST:417 , Min:119 , YQT:413     Diastolic (52PYP), WQN:93, Min:61, Max:72    Wt Readings from Last 5 Encounters:   08/19/19 87 1 kg (192 lb 0 3 oz)   06/25/19 88 9 kg (196 lb)   05/01/19 88 9 kg (196 lb)   04/02/19 90 4 kg (199 lb 3 2 oz)   03/15/19 92 1 kg (203 lb)     I/O       08/17 0701 - 08/18 0700 08/18 0701 - 08/19 0700 08/19 0701 - 08/20 0700    P  O  950 240 370    I V  (mL/kg)       Total Intake(mL/kg) 950 (11) 240 (2 8) 370 (4 2)    Urine (mL/kg/hr) 1318 (0 6) 550 (0 3) 1036 (1 3)    Stool 0 0 0    Total Output 1592 788 9569    Net -650 -310 -666           Unmeasured Urine Occurrence 5 x      Unmeasured Stool Occurrence 1 x 2 x 1 x              Physical Exam    Constitutional:  Michelle Primus appears well, alert and oriented x 3, pleasant and cooperative  No acute distress   HEENT:    Normocephalic, atraumatic   Neck:  Supple, No JVD   Lungs:  Clear to auscultation bilaterally, respirations unlabored    Chest Wall:  No tenderness or deformity    Heart::  Regular rate, Regular rhythm, S1 and S2 normal, no murmur, rub or gallop    Abdomen:  Soft, non-tender, non-distended   Neurological:  Awake, alert, oriented x3   Non-focal exam    Extremities:  trace pedal edema, No calf tenderness         Laboratory Results:  Results from last 7 days   Lab Units 08/16/19  0721 08/16/19  0439 08/16/19  0053   TROPONIN I ng/mL 1 22* 1 24* 0 99*       CBC with diff:   Results from last 7 days   Lab Units 08/19/19  0553 08/18/19  0538 08/17/19  0559 08/16/19  0439 08/16/19  0214 08/16/19  0053   WBC Thousand/uL 5 61 5 32 6 73 8 05 8 16 10 13   HEMOGLOBIN g/dL 10 4* 11 0* 9 9* 10 1* 10 2* 11 5   HEMATOCRIT % 32 7* 37 4 32 6* 31 7* 32 5* 36 3   MCV fL 102* 109* 105* 101* 102* 102*   PLATELETS Thousands/uL 194 154 169 172 170 196   MCH pg 32 3 32 0 31 7 32 2 32 1 32 4   MCHC g/dL 31 8 29 4* 30 4* 31 9 31 4 31 7   RDW % 13 2 13 3 13 4 13 7 13 6 13 5   MPV fL 9 5 9 6 9 5 9 4 9 5 9 4   NRBC AUTO /100 WBCs 0 0  --   --   --  0         CMP:  Results from last 7 days   Lab Units 08/19/19  0553 08/18/19  0531 08/17/19  0559 08/16/19  0439 08/16/19  0053   POTASSIUM mmol/L 3 7 3 9 3 9 4 2 5 0   CHLORIDE mmol/L 98* 100 99* 102 103   CO2 mmol/L 32 22 28 27 26   BUN mg/dL 40* 41* 33* 33* 33*   CREATININE mg/dL 1 62* 1 59* 1 28 1 28 1 35*   CALCIUM mg/dL 9 3 9 5 9 2 9 1 9 5   AST U/L 17  --   --   --  37   ALT U/L 15  --   --   --  20 ALK PHOS U/L 55  --   --   --  68   EGFR ml/min/1 73sq m 29 29 38 38 36         BMP:  Results from last 7 days   Lab Units 08/19/19  0553 08/18/19  0531 08/17/19  0559 08/16/19  0439 08/16/19  0053   POTASSIUM mmol/L 3 7 3 9 3 9 4 2 5 0   CHLORIDE mmol/L 98* 100 99* 102 103   CO2 mmol/L 32 22 28 27 26   BUN mg/dL 40* 41* 33* 33* 33*   CREATININE mg/dL 1 62* 1 59* 1 28 1 28 1 35*   CALCIUM mg/dL 9 3 9 5 9 2 9 1 9 5       BNP: No results for input(s): BNP in the last 72 hours      Magnesium:   Results from last 7 days   Lab Units 08/18/19  0531 08/16/19  0439   MAGNESIUM mg/dL 2 5 2 1       Coags:   Results from last 7 days   Lab Units 08/16/19  0928 08/16/19  0214   PTT seconds 75* 173*   INR   --  1 15       TSH:        Hemoglobin A1C   Results from last 7 days   Lab Units 08/16/19  0439   HEMOGLOBIN A1C % 5 0       Lipid Profile:   Results from last 7 days   Lab Units 08/16/19  0439   TRIGLYCERIDES mg/dL 99   HDL mg/dL 56       Meds/Allergies   all current active meds have been reviewed and current meds:   Current Facility-Administered Medications   Medication Dose Route Frequency    acetaminophen (TYLENOL) tablet 650 mg  650 mg Oral Q6H PRN    albuterol (PROVENTIL HFA,VENTOLIN HFA) inhaler 2 puff  2 puff Inhalation Q4H PRN    [START ON 8/20/2019] amLODIPine (NORVASC) tablet 5 mg  5 mg Oral Daily    calcium carbonate-vitamin D (OSCAL-D) 500 mg-200 units per tablet 1 tablet  1 tablet Oral BID With Meals    docusate sodium (COLACE) capsule 100 mg  100 mg Oral Daily    fish oil capsule 1,000 mg  1,000 mg Oral Daily    gabapentin (NEURONTIN) capsule 600 mg  600 mg Oral TID    heparin (porcine) subcutaneous injection 5,000 Units  5,000 Units Subcutaneous Q8H [facility]    hydroxychloroquine (PLAQUENIL) tablet 200 mg  200 mg Oral Daily    levothyroxine tablet 112 mcg  112 mcg Oral Early Morning    lidocaine (LIDODERM) 5 % patch 2 patch  2 patch Topical Daily    methocarbamol (ROBAXIN) tablet 500 mg  500 mg Oral TID PRN    metoprolol tartrate (LOPRESSOR) tablet 25 mg  25 mg Oral BID    multivitamin-minerals (CENTRUM) tablet 1 tablet  1 tablet Oral Daily    nitroglycerin (NITROSTAT) SL tablet 0 4 mg  0 4 mg Sublingual Q5 Min PRN    nystatin (MYCOSTATIN) powder 1 application  1 application Topical TID    potassium chloride (K-DUR,KLOR-CON) CR tablet 20 mEq  20 mEq Oral Daily    sertraline (ZOLOFT) tablet 25 mg  25 mg Oral HS     Medications Prior to Admission   Medication    clopidogrel (PLAVIX) 75 mg tablet    acetaminophen (TYLENOL) 325 mg tablet    albuterol (PROVENTIL HFA,VENTOLIN HFA) 90 mcg/act inhaler    amLODIPine (NORVASC) 5 mg tablet    calcium carbonate-vitamin D (OSCAL-D) 500 mg-200 units per tablet    docusate sodium (COLACE) 100 mg capsule    furosemide (LASIX) 20 mg tablet    gabapentin (NEURONTIN) 300 mg capsule    HYDROcodone-acetaminophen (NORCO) 5-325 mg per tablet    hydroxychloroquine (PLAQUENIL) 200 mg tablet    Levothyroxine Sodium 112 MCG CAPS    lidocaine (LIDODERM) 5 %    losartan (COZAAR) 100 MG tablet    methocarbamol (ROBAXIN) 500 mg tablet    metoprolol tartrate (LOPRESSOR) 25 mg tablet    Multiple Vitamins-Calcium (MULTI-DAY/CALCIUM/EXTRA IRON PO)    Multiple Vitamins-Minerals (CENTRUM ADULTS PO)    nitroglycerin (NITROSTAT) 0 4 mg SL tablet    nystatin (MYCOSTATIN) powder    Omega-3 Fatty Acids (FISH OIL) 1,000 mg    sertraline (ZOLOFT) 25 mg tablet            Cardiac testing:   Results for orders placed during the hospital encounter of 19   Echo complete with contrast if indicated    Narrative [address]  [facility] [place] [address] [facility]  [phone]    Transthoracic Echocardiogram  2D, M-mode, Doppler, and Color Doppler    Study date:  16-Aug-2019    Patient: Yves aCrreon  MR number: [id number]  Account number: [de-identified]  : 1934  Age: 80 years  Gender: Female  Status: Inpatient  Location: Bedside  Height: 59 in  Weight: 223 lb  BP: 142/ 65 mmHg    Indications: Heart failure    Diagnoses: I50 9 - Heart failure, unspecified    Sonographer:  ORVILLE Peoples  Primary Physician:  Florencia Morgan DO  Referring Physician:  Kimo Asif DO  Group:  [id number]  Cardiology Fellow: Lisa Nicole MD  Interpreting Physician:  Arron Torres MD    SUMMARY    LEFT VENTRICLE:  Systolic function was normal by visual assessment  Ejection fraction was estimated to be 55 %  There was moderate hypokinesis of the mid anteroseptal wall(s)  Features were consistent with a pseudonormal left ventricular filling pattern, with concomitant abnormal relaxation and increased filling pressure (grade 2 diastolic dysfunction)  RIGHT VENTRICLE:  Systolic pressure was mildly increased  Estimated peak pressure was 40 mmHg  LEFT ATRIUM:  The atrium was mildly dilated  MITRAL VALVE:  There was mild to moderate annular calcification  There was mild regurgitation  AORTIC VALVE:  A bioprosthesis was present  It exhibited normal function  TRICUSPID VALVE:  There was mild regurgitation  PULMONIC VALVE:  There was trace regurgitation  PROCEDURE: The procedure was performed at the bedside  This was a routine study  The transthoracic approach was used  The study included complete 2D imaging, M-mode, complete spectral Doppler, and color Doppler  The heart rate was 61 bpm,  at the start of the study  Intravenous contrast (  4 Definity in NSS) was administered to opacify the left ventricle  Echocardiographic views were limited due to restricted patient mobility, poor acoustic window availability, decreased  penetration, and lung interference  This was a technically difficult study  LEFT VENTRICLE: Size was normal  Systolic function was normal by visual assessment  Ejection fraction was estimated to be 55 %  There was moderate hypokinesis of the mid anteroseptal wall(s)   Wall thickness was normal  DOPPLER: The ratio  of early ventricular filling to atrial contraction velocities was within the normal range  Features were consistent with a pseudonormal left ventricular filling pattern, with concomitant abnormal relaxation and increased filling pressure  (grade 2 diastolic dysfunction)  RIGHT VENTRICLE: The size was normal  Systolic function was normal  DOPPLER: Systolic pressure was mildly increased  Estimated peak pressure was 40 mmHg  LEFT ATRIUM: The atrium was mildly dilated  RIGHT ATRIUM: Size was normal     MITRAL VALVE: There was mild to moderate annular calcification  Valve structure was normal  There was normal leaflet separation  DOPPLER: The transmitral velocity was within the normal range  There was no evidence for stenosis  There was  mild regurgitation  AORTIC VALVE: A bioprosthesis was present  It exhibited normal function  TRICUSPID VALVE: The valve structure was normal  There was normal leaflet separation  DOPPLER: The transtricuspid velocity was within the normal range  There was no evidence for stenosis  There was mild regurgitation  PULMONIC VALVE: Leaflets exhibited normal thickness, no calcification, and normal cuspal separation  DOPPLER: The transpulmonic velocity was within the normal range  There was trace regurgitation  PERICARDIUM: There was no pericardial effusion  AORTA: The root exhibited normal size      SYSTEM MEASUREMENT TABLES    2D  %FS: 25 46 %  EDV(Teich): 128 16 ml  EF(Cube): 58 59 %  EF(Teich): 49 87 %  ESV(Cube): 57 45 ml  ESV(Teich): 64 25 ml  IVSd: 0 61 cm  LA Diam: 3 77 cm  LVEDV MOD A4C: 94 27 ml  LVEF MOD A4C: 43 45 %  LVESV MOD A4C: 53 3 ml  LVIDd: 5 18 cm  LVIDs: 3 86 cm  LVLd A4C: 7 cm  LVLs A4C: 6 41 cm  LVPWd: 0 94 cm  SV MOD A4C: 40 96 ml  SV(Cube): 81 27 ml  SV(Teich): 63 91 ml    CW  TR Vmax: 2 99 m/s  TR maxP 66 mmHg    PW  MV A Brayan: 0 74 m/s  MV Dec [place]: 4 35 m/s2  MV DecT: 208 99 ms  MV E Brayan: 0 91 m/s  MV E/A Ratio: 1 23    [facility] Accredited [facility]chocardi[facility] Laboratory    Prepared and electronically signed by    Emile Nixon MD  Signed 16-Aug-2019 15:33:11       No results found for this or any previous visit  No results found for this or any previous visit  No results found for this or any previous visit

## 2019-08-19 NOTE — ASSESSMENT & PLAN NOTE
Lab Results   Component Value Date    HGBA1C 5 0 08/16/2019     Recent Labs     08/16/19  1648 08/16/19  2104 08/17/19  0711 08/17/19  1056   POCGLU 135 115 144* 122     · Controlled currently  A1c is only 5    Will discontinue insulin sliding scale and blood glucose checks  · No T2DM for all practical purposes

## 2019-08-19 NOTE — ASSESSMENT & PLAN NOTE
Wt Readings from Last 3 Encounters:   08/18/19 86 2 kg (190 lb 0 6 oz)   06/25/19 88 9 kg (196 lb)   05/01/19 88 9 kg (196 lb)     · Appreciate Cardiology recommendations  Repeat 2D echocardiogram shows preserved EF with grade 2 diastolic dysfunction  Patient has history of bioprosthetic aortic valve which appears to be normally functioning  · Patient is responding well to IV diuretics, though still appears volume overloaded  She is on room air though weight is still significantly up at 222 lb (she was 196 lb in the end of June)  · IV Lasix 80 mg b i d   On hold  · Replete lytes as needed  · Telemetry monitoring  · Fluid and sodium restriction

## 2019-08-19 NOTE — PROGRESS NOTES
Progress Note - Michelle Primus 1934, 80 y o  female MRN: [id number]    Unit/Bed#: [facility] 507-01 Encounter: [id number]    Primary Care Provider: Kristian Garrett DO   Date and time admitted to hospital: 8/16/2019 12:33 AM    Dysphagia  Assessment & Plan  · evaluated by speech pathology  · should follow up with Gastroenterology in the outpatient setting for consideration of EGD  · For now continue regular diet as tolerated    Acute kidney injury [facility])  Assessment & Plan    Results from last 7 days   Lab Units 08/18/19  0531 08/17/19  0559 08/16/19  0439 08/16/19  0053   BUN mg/dL 41* 33* 33* 33*   CREATININE mg/dL 1 59* 1 28 1 28 1 35*   EGFR ml/min/1 73sq m 29 38 38 36   · in context of diuresis - held  · DC Amlodipine for soft BP  · If worse, hold losartan as well    Elevated troponin  Assessment & Plan  · 2" CHF  · No further intervention    Results from last 7 days   Lab Units 08/16/19  0721 08/16/19  0439 08/16/19  0053   TROPONIN I ng/mL 1 22* 1 24* 0 99*       History of aortic valve replacement with bioprosthetic valve  Assessment & Plan  · Repeat 2D echocardiogram this admission shows normal functioning bioprosthetic valve  No further intervention indicated at this time    Rheumatoid arthritis involving multiple sites [facility])  Assessment & Plan  · Continue hydroxychloroquine  Stable    Type 2 diabetes mellitus with diabetic polyneuropathy [facility])  Assessment & Plan  Lab Results   Component Value Date    HGBA1C 5 0 08/16/2019     Recent Labs     08/16/19  1648 08/16/19  2104 08/17/19  0711 08/17/19  1056   POCGLU 135 115 144* 122     · Controlled currently  A1c is only 5    Will discontinue insulin sliding scale and blood glucose checks  · No T2DM for all practical purposes    Acute on chronic diastolic heart failure [facility])  Assessment & Plan  Wt Readings from Last 3 Encounters:   08/18/19 86 2 kg (190 lb 0 6 oz)   06/25/19 88 9 kg (196 lb)   05/01/19 88 9 kg (196 lb)     · Appreciate Cardiology recommendations  Repeat 2D echocardiogram shows preserved EF with grade 2 diastolic dysfunction  Patient has history of bioprosthetic aortic valve which appears to be normally functioning  · Patient is responding well to IV diuretics, though still appears volume overloaded  She is on room air though weight is still significantly up at 222 lb (she was 196 lb in the end of )  · IV Lasix 80 mg b i d  On hold  · Replete lytes as needed  · Telemetry monitoring  · Fluid and sodium restriction    * Acute respiratory failure with hypoxia (HCC)  Assessment & Plan  · Improving with diuresis  · Wean off o2  · OOB and Ambulate      VTE Pharmacologic Prophylaxis: Heparin  Mechanical: Mechanical VTE prophylaxis in place  Patient Centered Rounds: I have performed bedside rounds with nursing staff today  Discussions with Specialists or Other Care Team Provider:     Education and Discussions with Family / Patient:     Time Spent for Care: More than 50% of total time spent on counseling and coordination of care as described above  Current Length of Stay: 2 day(s)    Current Patient Status: Inpatient   Certification Statement: The patient will continue to require additional inpatient hospital stay due to as above    Discharge Plan:    Code Status: Level 3 - DNAR and DNI      Subjective: nil acute    Objective:     Vitals:   Temp (24hrs), Av 1 °F (36 7 °C), Min:97 5 °F (36 4 °C), Max:98 5 °F (36 9 °C)    Temp:  [97 5 °F (36 4 °C)-98 5 °F (36 9 °C)] 97 5 °F (36 4 °C)  HR:  [54-70] 62  Resp:  [18-20] 18  BP: (100-142)/(48-65) 142/65  SpO2:  [94 %-99 %] 97 %  Body mass index is 38 38 kg/m²  Input and Output Summary (last 24 hours): Intake/Output Summary (Last 24 hours) at 2019 2853  Last data filed at 2019 1540  Gross per 24 hour   Intake 480 ml   Output 300 ml   Net 180 ml       Physical Exam   Constitutional: She is oriented to person, place, and time  HENT:   Head: Normocephalic  Mouth/Throat: Oropharynx is clear and moist    Eyes: Pupils are equal, round, and reactive to light  Cardiovascular: Normal rate and normal heart sounds  Pulmonary/Chest: Effort normal  She has rales  Musculoskeletal: She exhibits edema  Neurological: She is alert and oriented to person, place, and time  Additional Data:     Labs:    Results from last 7 days   Lab Units 08/18/19  0538   WBC Thousand/uL 5 32   HEMOGLOBIN g/dL 11 0*   HEMATOCRIT % 37 4   PLATELETS Thousands/uL 154   NEUTROS PCT % 51   LYMPHS PCT % 38   MONOS PCT % 6   EOS PCT % 4     Results from last 7 days   Lab Units 08/18/19  0531  08/16/19  0053   POTASSIUM mmol/L 3 9   < > 5 0   CHLORIDE mmol/L 100   < > 103   CO2 mmol/L 22   < > 26   BUN mg/dL 41*   < > 33*   CREATININE mg/dL 1 59*   < > 1 35*   CALCIUM mg/dL 9 5   < > 9 5   ALK PHOS U/L  --   --  68   ALT U/L  --   --  20   AST U/L  --   --  37    < > = values in this interval not displayed  Results from last 7 days   Lab Units 08/16/19  0214   INR  1 15       * I Have Reviewed All Lab Data Listed Above      Imaging:  Imaging Personally Reviewed by Myself Includes:     Cultures:   Blood Culture: No results found for: BLOODCX  Urine Culture:   Lab Results   Component Value Date    URINECX >100,000 cfu/ml Escherichia coli 09/23/2016     Sputum Culture: No components found for: SPUTUMCX  Wound Culture: No results found for: WOUNDCULT    Last 24 Hours Medication List:     Current Facility-Administered Medications:  acetaminophen 650 mg Oral Q6H PRN Oneta Escort, DO   albuterol 2 puff Inhalation Q4H PRN Oneta Escort, DO   calcium carbonate-vitamin D 1 tablet Oral BID With Meals Oneta Escort, DO   docusate sodium 100 mg Oral Daily Oneta Escort, DO   fish oil 1,000 mg Oral Daily Oneta Escort, DO   gabapentin 600 mg Oral TID Cozetta Crastanton, DO   heparin (porcine) 5,000 Units Subcutaneous [facility] Diandra Stevens MD   hydroxychloroquine 200 mg Oral Daily Oneta Escort, DO levothyroxine 112 mcg Oral Early Morning Myton Porch, DO   [START ON 8/19/2019] lidocaine 2 patch Topical Daily Nikkie Nino MD   [START ON 8/19/2019] losartan 50 mg Oral Daily Nikkie Nino MD   methocarbamol 500 mg Oral TID PRN Myton Porch, DO   metoprolol tartrate 25 mg Oral BID Myton Porch, DO   multivitamin-minerals 1 tablet Oral Daily Myton Porch, DO   nitroglycerin 0 4 mg Sublingual Q5 Min PRN Myton Porch, DO   nystatin 1 application Topical TID Rawleigh Ouch, DO   potassium chloride 20 mEq Oral Daily Jesse Dietrich MD   sertraline 25 mg Oral HS Myton Porch, DO        Today, Patient Was Seen By: Nikkie Nino MD    ** Please Note: Dragon 360 Dictation voice to text software may have been used in the creation of this document   **

## 2019-08-19 NOTE — ASSESSMENT & PLAN NOTE
· Non MI troponin elevation 2" CHF  · No further intervention    Results from last 7 days   Lab Units 08/16/19  0721 08/16/19  0439 08/16/19  0053   TROPONIN I ng/mL 1 22* 1 24* 0 99*

## 2019-08-19 NOTE — ASSESSMENT & PLAN NOTE
· evaluated by speech pathology  · should follow up with Gastroenterology in the outpatient setting for consideration of EGD  · For now continue regular diet as tolerated

## 2019-08-19 NOTE — SPEECH THERAPY NOTE
Speech/Language Pathology Progress Note    Patient Name: Miladis Alcantar  MZCBI'X Date: 8/19/2019    Subjective:  Pt awake and alert, sitting upright in chair at bedside for lunch  Objective:  Pt seen for diagnostic swallow tx for analysis of tolerance of regular diet and thin liquids during a meal  Pt reported she has had no recent issues with swallowing, no complaints  Seen for lunch today with tuna salad sandwich and thin liquids  Pt Demonstrated complete and timely mastication and swallow of sandwich  No s/s aspiration with regular solids or thin liquids  No report of globus sensation or "sticking/pressure" in her chest  Reviewed safe swallow strategies with pt including: Refraining from talking while chewing, masticate thoroughly, alternate liquids and solids, upright positioning during meals, slow rate of intake and small bites  Pt verbalized understanding and agreement  Assessment:  Pt is tolerating regular diet and thin liquids with no s/s aspiration, no complaints  Plan/Recommendations:  Continue regular diet and thin liquids  D/c ST services at this time

## 2019-08-19 NOTE — PLAN OF CARE
Problem: Prexisting or High Potential for Compromised Skin Integrity  Goal: Skin integrity is maintained or improved  Description  INTERVENTIONS:  - Identify patients at risk for skin breakdown  - Assess and monitor skin integrity  - Assess and monitor nutrition and hydration status  - Monitor labs   - Assess for incontinence   - Turn and reposition patient  - Assist with mobility/ambulation  - Relieve pressure over bony prominences  - Avoid friction and shearing  - Provide appropriate hygiene as needed including keeping skin clean and dry  - Evaluate need for skin moisturizer/barrier cream  - Collaborate with interdisciplinary team   - Patient/family teaching  - Consider wound care consult   Outcome: Progressing     Problem: Nutrition/Hydration-ADULT  Goal: Nutrient/Hydration intake appropriate for improving, restoring or maintaining nutritional needs  Description  Monitor and assess patient's nutrition/hydration status for malnutrition  Collaborate with interdisciplinary team and initiate plan and interventions as ordered  Monitor patient's weight and dietary intake as ordered or per policy  Utilize nutrition screening tool and intervene as necessary  Determine patient's food preferences and provide high-protein, high-caloric foods as appropriate       INTERVENTIONS:  - Monitor oral intake, urinary output, labs, and treatment plans  - Assess nutrition and hydration status and recommend course of action  - Evaluate amount of meals eaten  - Assist patient with eating if necessary   - Allow adequate time for meals  - Recommend/ encourage appropriate diets, oral nutritional supplements, and vitamin/mineral supplements  - Order, calculate, and assess calorie counts as needed  - Recommend, monitor, and adjust tube feedings and TPN/PPN based on assessed needs  - Assess need for intravenous fluids  - Provide specific nutrition/hydration education as appropriate  - Include patient/family/caregiver in decisions related to nutrition  Outcome: Progressing     Problem: Potential for Falls  Goal: Patient will remain free of falls  Description  INTERVENTIONS:  - Assess patient frequently for physical needs  -  Identify cognitive and physical deficits and behaviors that affect risk of falls    -  [place] fall precautions as indicated by assessment   - Educate patient/family on patient safety including physical limitations  - Instruct patient to call for assistance with activity based on assessment  - Modify environment to reduce risk of injury  - Consider OT/PT consult to assist with strengthening/mobility  Outcome: Progressing     Problem: CARDIOVASCULAR - ADULT  Goal: Maintains optimal cardiac output and hemodynamic stability  Description  INTERVENTIONS:  - Monitor I/O, vital signs and rhythm  - Monitor for S/S and trends of decreased cardiac output  - Administer and titrate ordered vasoactive medications to optimize hemodynamic stability  - Assess quality of pulses, skin color and temperature  - Assess for signs of decreased coronary artery perfusion  - Instruct patient to report change in severity of symptoms  Outcome: Progressing  Goal: Absence of cardiac dysrhythmias or at baseline rhythm  Description  INTERVENTIONS:  - Continuous cardiac monitoring, vital signs, obtain 12 lead EKG if ordered  - Administer antiarrhythmic and heart rate control medications as ordered  - Monitor electrolytes and administer replacement therapy as ordered  Outcome: Progressing     Problem: RESPIRATORY - ADULT  Goal: Achieves optimal ventilation and oxygenation  Description  INTERVENTIONS:  - Assess for changes in respiratory status  - Assess for changes in mentation and behavior  - Position to facilitate oxygenation and minimize respiratory effort  - Oxygen administered by appropriate delivery if ordered  - Initiate smoking cessation education as indicated  - Encourage broncho-pulmonary hygiene including cough, deep breathe, Incentive Spirometry  - Assess the need for suctioning and aspirate as needed  - Assess and instruct to report SOB or any respiratory difficulty  - Respiratory Therapy support as indicated  Outcome: Progressing     Problem: SKIN/TISSUE INTEGRITY - ADULT  Goal: Skin integrity remains intact  Description  INTERVENTIONS  - Identify patients at risk for skin breakdown  - Assess and monitor skin integrity  - Assess and monitor nutrition and hydration status  - Monitor labs (i e  albumin)  - Assess for incontinence   - Turn and reposition patient  - Assist with mobility/ambulation  - Relieve pressure over bony prominences  - Avoid friction and shearing  - Provide appropriate hygiene as needed including keeping skin clean and dry  - Evaluate need for skin moisturizer/barrier cream  - Collaborate with interdisciplinary team (i e  Nutrition, Rehabilitation, etc )   - Patient/family teaching  Outcome: Progressing  Goal: Incision(s), wounds(s) or drain site(s) healing without S/S of infection  Description  INTERVENTIONS  - Assess and document risk factors for skin impairment   - Assess and document dressing, incision, wound bed, drain sites and surrounding tissue  - Consider nutrition services referral as needed  - Oral mucous membranes remain intact  - Provide patient/ family education  Outcome: Progressing

## 2019-08-19 NOTE — ASSESSMENT & PLAN NOTE
· 2" CHF  · No further intervention    Results from last 7 days   Lab Units 08/16/19  0721 08/16/19  0439 08/16/19  0053   TROPONIN I ng/mL 1 22* 1 24* 0 99*

## 2019-08-19 NOTE — ASSESSMENT & PLAN NOTE
Wt Readings from Last 3 Encounters:   08/19/19 87 1 kg (192 lb 0 3 oz)   06/25/19 88 9 kg (196 lb)   05/01/19 88 9 kg (196 lb)     · Appreciate Cardiology recommendations  Repeat 2D echocardiogram shows preserved EF with grade 2 diastolic dysfunction  Patient has history of bioprosthetic aortic valve which appears to be normally functioning  · Patient is responding well to IV diuretics  She is on room air  · IV Lasix 80 mg b i d  On hold yesterday because of worsening creatinine  · Replete lytes as needed  · Telemetry monitoring  · Fluid and sodium restriction  · As per Cardiology note yesterday, plan was to switch her to p o  Torsemide  Waiting for attending to evaluate the patient  · Will monitor tonight  Repeat BMP tomorrow

## 2019-08-19 NOTE — PROGRESS NOTES
Cardiology   Silvino Ray 80 y o  female MRN: [id number]  Unit/Bed#: PPHP 507-01 Encounter: [id number]        Assessment/Plan:    >> Acute on chronic diastolic CHF  Known history of diastolic CHF, takes Lasix 20mg PO QD at home  Presented to the ED with respiratory failure after worsening SOB at rest and orthopnea for 2 days  Is not on any home O2  Was successfully weaned off O2 over the weekend  Appears to be at her baseline volume state at this time  Tolerated IV Lasix 80mg BID over the weekend, held yesterday because of Cr bump, will resume diuresis today, switch to Torsemide 40mg PO QD per Sunday cardiology rec's in preparation for discharge - please wait for attending attestation    >> Aortic Stenosis s/p bioprosthetic valve replacement  Per ECHO and physical exam, valve is functioning normally     >> Non-MI Troponin elevation  Troponin on presentation to ED was 0 99, peaked at 1 24  No chest pain at any time  Pt does not have a history of CAD  No ECG changes  Likely secondary to non-MI troponin elevation from acute exacerbation of CHF and respiratory failure     >> HLD  Not on home statin, recommend that she should be started on high dose statin  LDL was 128 on presentation  Has known DM type 2  Has known HTN     >> HTN  On Losartan 100mg PO QD at home     >> DM type 2  Known history of DM type 2   On insulin, continued in hospital  A1c at this hospitalization is 5%      Subjective: 80y o  year old female with cardiac history significant for diastolic CHF, aortic stenosis s/p bioprosthetic valve replacement, DM type 2, HTN, HLD who presented to the ED on Thursday with 2 day history of worsening shortness of breath at rest, leg swelling and orthopnea  On presentation to the ED she required BiPAP and IV nitroglycerin  Labs from the ED showed troponin elevated to 0 99 which peaked at 1 24, and CXR showed pulmonary vascular congestion   She was started on heparin, loaded with brilinta and given ASA in the ED and is now on IV lasix 40 BID, amlodipine 5mg QD, metoprolol tartrate 25 PO BID, and she has nitro PRN  Pt follows with cardiology at [address] [place] [address] [address], Dr Giana Sinclair, she states she has had many previous stress tests and ECHOs and they have always been normal  ECHO over the weekend showed normal systolic function, EF of 03%, moderate hypokinesis of the mid anteroseptal wall with grade 2 diastolic dysfunction, mild MR/TR  Over the weekend, the pt was weaned completely off O2 successfully  I saw the pt this morning and she was sitting up in her chair, reading  She has no complaints, states she feels her normal self, and wants to go home at this time       Denies chest pain, shortness of breath, palpitations, orthopnea, PND, syncope, presyncope, diaphoresis, nausea/vomiting at this time    Remainder of ROS done and negative    Telemetry: N/A    Net fluid balance: -361mL (-5 5L overall)    Weight: 87 1kgs    EKG: normal sinus    Labs: Cr 1 62, K 3 7, all others unremarkable          Historical Information   Past Medical History:   Diagnosis Date    Arthritis     Breast cancer ([facility]     Cardiac disease     aortic valve transplant    [facility]     Compression fracture of body of thoracic vertebra ([facility])     CVA (cerebral vascular accident) ([facility]     Diabetes mellitus ([facility] )     Disease of thyroid gland     Fibromyalgia, primary     Hyperlipidemia     Hypertension     Neuropathy     Pressure injury of skin     Renal disorder     kidney stent    Stroke [facility])      Past Surgical History:   Procedure Laterality Date    AORTIC VALVE SURGERY      BREAST SURGERY      lumpectomy for breast cancer    CATARACT EXTRACTION      CHOLECYSTECTOMY      HYSTERECTOMY  1978    KIDNEY SURGERY      stent placed for kidney    OTHER SURGICAL HISTORY      abdominal aneurysm surgery    [place] ARTHRODESIS POSTERIOR ATLAS-AXIS C1-C2 N/A 5/1/2019    Procedure: C1-C2 lateral mass fixation fusion with possible sublaminar cables;  Surgeon: Farzaneh Mendez MD;  [facility];  Service: Neurosurgery    REPLACEMENT TOTAL KNEE      left      Social History   Social History     Substance and Sexual Activity   Alcohol Use Not Currently     Social History     Substance and Sexual Activity   Drug Use No     Social History     Tobacco Use   Smoking Status Never Smoker   Smokeless Tobacco Never Used     Family History:   Family History   Problem Relation Age of Onset    Heart disease Mother     Arthritis Mother     Diabetes Mother     Heart failure Father     Arthritis Father     Other Father         enlargement of prostate     Dementia Father            Scheduled Meds:  Current Facility-Administered Medications:  acetaminophen 650 mg Oral Q6H PRN Adra Cones, DO   albuterol 2 puff Inhalation Q4H PRN Adra Cones, DO   calcium carbonate-vitamin D 1 tablet Oral BID With Meals Adra Cones, DO   docusate sodium 100 mg Oral Daily Adra Cones, DO   fish oil 1,000 mg Oral Daily Adra Cones, DO   gabapentin 600 mg Oral TID Akil Ridges, DO   heparin (porcine) 5,000 Units Subcutaneous [facility] Luis Manuel Aceves MD   hydroxychloroquine 200 mg Oral Daily Adra Cones, DO   levothyroxine 112 mcg Oral Early Morning Adra Cones, DO   lidocaine 2 patch Topical Daily Vielka Singh MD   losartan 50 mg Oral Daily Vielka Singh MD   methocarbamol 500 mg Oral TID PRN Adra Cones, DO   metoprolol tartrate 25 mg Oral BID Adra Cones, DO   multivitamin-minerals 1 tablet Oral Daily Adra Cones, DO   nitroglycerin 0 4 mg Sublingual Q5 Min PRN Adra Cones, DO   nystatin 1 application Topical TID Gt Davila, DO   potassium chloride 20 mEq Oral Daily Luis Manuel Aceves MD   sertraline 25 mg Oral HS Adra Cones, DO     Continuous Infusions:   PRN Meds:   acetaminophen    albuterol    methocarbamol    nitroglycerin  all current active meds have been reviewed, current meds:   Current Facility-Administered Medications   Medication Dose Route Frequency    acetaminophen (TYLENOL) tablet 650 mg  650 mg Oral Q6H PRN    albuterol (PROVENTIL HFA,VENTOLIN HFA) inhaler 2 puff  2 puff Inhalation Q4H PRN    calcium carbonate-vitamin D (OSCAL-D) 500 mg-200 units per tablet 1 tablet  1 tablet Oral BID With Meals    docusate sodium (COLACE) capsule 100 mg  100 mg Oral Daily    fish oil capsule 1,000 mg  1,000 mg Oral Daily    gabapentin (NEURONTIN) capsule 600 mg  600 mg Oral TID    heparin (porcine) subcutaneous injection 5,000 Units  5,000 Units Subcutaneous Q8H [address]    hydroxychloroquine (PLAQUENIL) tablet 200 mg  200 mg Oral Daily    levothyroxine tablet 112 mcg  112 mcg Oral Early Morning    lidocaine (LIDODERM) 5 % patch 2 patch  2 patch Topical Daily    losartan (COZAAR) tablet 50 mg  50 mg Oral Daily    methocarbamol (ROBAXIN) tablet 500 mg  500 mg Oral TID PRN    metoprolol tartrate (LOPRESSOR) tablet 25 mg  25 mg Oral BID    multivitamin-minerals (CENTRUM) tablet 1 tablet  1 tablet Oral Daily    nitroglycerin (NITROSTAT) SL tablet 0 4 mg  0 4 mg Sublingual Q5 Min PRN    nystatin (MYCOSTATIN) powder 1 application  1 application Topical TID    potassium chloride (K-DUR,KLOR-CON) CR tablet 20 mEq  20 mEq Oral Daily    sertraline (ZOLOFT) tablet 25 mg  25 mg Oral HS    and PTA meds:   Prior to Admission Medications   Prescriptions Last Dose Informant Patient Reported? Taking?    HYDROcodone-acetaminophen (NORCO) 5-325 mg per tablet  Self Yes No   Sig: Take 1 tablet by mouth every 6 (six) hours as needed for pain   Levothyroxine Sodium 112 MCG CAPS  Self Yes No   Sig: Take by mouth daily   Multiple Vitamins-Calcium (MULTI-DAY/CALCIUM/EXTRA IRON PO)  Self Yes No   Sig: Take 1 tablet by mouth daily   Multiple Vitamins-Minerals (CENTRUM ADULTS PO)  Self Yes No   Sig: Centrum   Omega-3 Fatty Acids (FISH OIL) 1,000 mg  Self Yes No   Sig: Fish Oil 1,000 mg capsule   acetaminophen (TYLENOL) 325 mg tablet  Self No No Sig: Take 2 tablets (650 mg total) by mouth every 6 (six) hours as needed for mild pain   Patient taking differently: Take 1,000 mg by mouth 3 (three) times a day as needed for mild pain    albuterol (PROVENTIL HFA,VENTOLIN HFA) 90 mcg/act inhaler  Self Yes No   Sig: Inhale 2 puffs every 4 (four) hours as needed for wheezing   amLODIPine (NORVASC) 5 mg tablet  Self Yes No   Sig: Take 5 mg by mouth every morning    calcium carbonate-vitamin D (OSCAL-D) 500 mg-200 units per tablet  Self Yes No   Sig: Take 1 tablet by mouth 2 (two) times a day with meals     clopidogrel (PLAVIX) 75 mg tablet  Child Yes Yes   Sig: Take 75 mg by mouth daily   docusate sodium (COLACE) 100 mg capsule  Self No No   Sig: Take 1 capsule (100 mg total) by mouth 2 (two) times a day as needed for constipation   Patient taking differently: Take 100 mg by mouth daily    furosemide (LASIX) 20 mg tablet  Self Yes No   Sig: Take 20 mg by mouth daily    gabapentin (NEURONTIN) 300 mg capsule  Self No No   Sig: Take 2 capsules PO three times a day   hydroxychloroquine (PLAQUENIL) 200 mg tablet  Self Yes No   Sig: Take 200 mg by mouth daily    lidocaine (LIDODERM) 5 %  Self Yes No   Sig: Apply 2 patches topically daily Remove & Discard patch within 12 hours or as directed by MD     losartan (COZAAR) 100 MG tablet  Self No No   Sig: Take 0 5 tablets (50 mg total) by mouth daily   methocarbamol (ROBAXIN) 500 mg tablet  Self No No   Sig: Take 1 tablet (500 mg total) by mouth 3 (three) times a day as needed for muscle spasms   metoprolol tartrate (LOPRESSOR) 25 mg tablet  Self Yes No   Sig: Take 25 mg by mouth 2 (two) times a day    nitroglycerin (NITROSTAT) 0 4 mg SL tablet  Self Yes No   Sig: Place under the tongue every 5 (five) minutes as needed for chest pain    nystatin (MYCOSTATIN) powder  Self Yes No   Sig: Apply topically as needed    sertraline (ZOLOFT) 25 mg tablet  Self Yes No   Si mg daily at bedtime       Facility-Administered Medications: None       Allergies   Allergen Reactions    Duloxetine Hcl Other (See Comments) and Hypertension    Duloxetine Hcl      Cymbalta; Hemorrhagic stroke listed as reaction    Escitalopram      Other reaction(s): Urinary Retention    Pregabalin      Other reaction(s): Hypertension    Statins Myalgia    Tramadol      Other reaction(s): Hypertension    Triprolidine-Pse Other (See Comments)    Anastrozole Abdominal Pain    Anastrozole     Antihistamines, Diphenhydramine-Type     Exemestane      Aromasin; Muscle pain & cramps    Lexapro [Escitalopram]      Urinary retention    Lyrica [Pregabalin]      hypertension    Metformin      diarrhea    Oxycodone     Statins      Muscle pain & cramps    Tramadol      hypertension    Triprolidine-Pseudoephedrine     Metformin Diarrhea       Objective   Vitals: Blood pressure 161/67, pulse 62, temperature 98 1 °F (36 7 °C), temperature source Oral, resp  rate 19, height 4' 11" (1 499 m), weight 87 1 kg (192 lb 0 3 oz), SpO2 93 %, not currently breastfeeding , Body mass index is 38 78 kg/m² , Orthostatic Blood Pressures      Most Recent Value   Blood Pressure  161/67 filed at 08/19/2019 0705   Patient Position - Orthostatic VS  Lying filed at 08/19/2019 0705            Intake/Output Summary (Last 24 hours) at 8/19/2019 0850  Last data filed at 8/19/2019 2049  Gross per 24 hour   Intake 490 ml   Output 1161 ml   Net -671 ml       Invasive Devices     Peripheral Intravenous Line            Peripheral IV 08/16/19 Left Wrist 3 days    Peripheral IV 08/16/19 Right Antecubital 3 days                Physical Exam:    General:  AO x3, no acute distress  Cardiac:  S1-S2 normal  No murmurs, rubs or gallops, JVP: no elevation appreciated  Lungs:  Clear to auscultation bilaterally, crackles have resolved    Abdomen:  Soft nontender nondistended, positive bowel sounds  Extremities:  Warm, well perfused, pulses palpable, no ulcers or rashes, nonpitting pedal edema present  Neuro: Grossly nonfocal  Psych:  Normal affect      Lab Results:   Recent Results (from the past 24 hour(s))   Comprehensive metabolic panel    Collection Time: 08/19/19  5:53 AM   Result Value Ref Range    Sodium 138 136 - 145 mmol/L    Potassium 3 7 3 5 - 5 3 mmol/L    Chloride 98 (L) 100 - 108 mmol/L    CO2 32 21 - 32 mmol/L    ANION GAP 8 4 - 13 mmol/L    BUN 40 (H) 5 - 25 mg/dL    Creatinine 1 62 (H) 0 60 - 1 30 mg/dL    Glucose 91 65 - 140 mg/dL    Calcium 9 3 8 3 - 10 1 mg/dL    AST 17 5 - 45 U/L    ALT 15 12 - 78 U/L    Alkaline Phosphatase 55 46 - 116 U/L    Total Protein 7 0 6 4 - 8 2 g/dL    Albumin 3 3 (L) 3 5 - 5 0 g/dL    Total Bilirubin 0 35 0 20 - 1 00 mg/dL    eGFR 29 ml/min/1 73sq m   CBC and differential    Collection Time: 08/19/19  5:53 AM   Result Value Ref Range    WBC 5 61 4 31 - 10 16 Thousand/uL    RBC 3 22 (L) 3 81 - 5 12 Million/uL    Hemoglobin 10 4 (L) 11 5 - 15 4 g/dL    Hematocrit 32 7 (L) 34 8 - 46 1 %     (H) 82 - 98 fL    MCH 32 3 26 8 - 34 3 pg    MCHC 31 8 31 4 - 37 4 g/dL    RDW 13 2 11 6 - 15 1 %    MPV 9 5 8 9 - 12 7 fL    Platelets 891 214 - 029 Thousands/uL    nRBC 0 /100 WBCs    Neutrophils Relative 44 43 - 75 %    Immat GRANS % 0 0 - 2 %    Lymphocytes Relative 41 14 - 44 %    Monocytes Relative 7 4 - 12 %    Eosinophils Relative 7 (H) 0 - 6 %    Basophils Relative 1 0 - 1 %    Neutrophils Absolute 2 47 1 85 - 7 62 Thousands/µL    Immature Grans Absolute 0 02 0 00 - 0 20 Thousand/uL    Lymphocytes Absolute 2 32 0 60 - 4 47 Thousands/µL    Monocytes Absolute 0 38 0 17 - 1 22 Thousand/µL    Eosinophils Absolute 0 37 0 00 - 0 61 Thousand/µL    Basophils Absolute 0 05 0 00 - 0 10 Thousands/µL   ]    Imaging: I have personally reviewed pertinent reports

## 2019-08-19 NOTE — ASSESSMENT & PLAN NOTE
Results from last 7 days   Lab Units 08/19/19  0553 08/18/19  0531 08/17/19  0559 08/16/19  0439 08/16/19  0053   BUN mg/dL 40* 41* 33* 33* 33*   CREATININE mg/dL 1 62* 1 59* 1 28 1 28 1 35*   EGFR ml/min/1 73sq m 29 29 38 38 36   ·     Creatinine slowly worsening  1 6 today  Baseline around 1 2-1 3  Diuresis currently on hold  Recheck BMP tomorrow

## 2019-08-19 NOTE — ASSESSMENT & PLAN NOTE
Results from last 7 days   Lab Units 08/18/19  0531 08/17/19  0559 08/16/19  0439 08/16/19  0053   BUN mg/dL 41* 33* 33* 33*   CREATININE mg/dL 1 59* 1 28 1 28 1 35*   EGFR ml/min/1 73sq m 29 38 38 36   · in context of diuresis - held  · DC Amlodipine for soft BP  · If worse, hold losartan as well

## 2019-08-20 VITALS
DIASTOLIC BLOOD PRESSURE: 65 MMHG | RESPIRATION RATE: 16 BRPM | TEMPERATURE: 97.6 F | HEART RATE: 59 BPM | WEIGHT: 192.02 LBS | BODY MASS INDEX: 38.71 KG/M2 | OXYGEN SATURATION: 91 % | HEIGHT: 59 IN | SYSTOLIC BLOOD PRESSURE: 144 MMHG

## 2019-08-20 LAB
ANION GAP SERPL CALCULATED.3IONS-SCNC: 7 MMOL/L (ref 4–13)
BUN SERPL-MCNC: 43 MG/DL (ref 5–25)
CALCIUM SERPL-MCNC: 9.3 MG/DL (ref 8.3–10.1)
CHLORIDE SERPL-SCNC: 102 MMOL/L (ref 100–108)
CO2 SERPL-SCNC: 29 MMOL/L (ref 21–32)
CREAT SERPL-MCNC: 1.5 MG/DL (ref 0.6–1.3)
GFR SERPL CREATININE-BSD FRML MDRD: 32 ML/MIN/1.73SQ M
GLUCOSE SERPL-MCNC: 88 MG/DL (ref 65–140)
POTASSIUM SERPL-SCNC: 4 MMOL/L (ref 3.5–5.3)
SODIUM SERPL-SCNC: 138 MMOL/L (ref 136–145)

## 2019-08-20 PROCEDURE — 97162 PT EVAL MOD COMPLEX 30 MIN: CPT

## 2019-08-20 PROCEDURE — 99221 1ST HOSP IP/OBS SF/LOW 40: CPT | Performed by: OBSTETRICS & GYNECOLOGY

## 2019-08-20 PROCEDURE — G8980 MOBILITY D/C STATUS: HCPCS

## 2019-08-20 PROCEDURE — 97166 OT EVAL MOD COMPLEX 45 MIN: CPT

## 2019-08-20 PROCEDURE — 99239 HOSP IP/OBS DSCHRG MGMT >30: CPT | Performed by: INTERNAL MEDICINE

## 2019-08-20 PROCEDURE — G8978 MOBILITY CURRENT STATUS: HCPCS

## 2019-08-20 PROCEDURE — 80048 BASIC METABOLIC PNL TOTAL CA: CPT | Performed by: INTERNAL MEDICINE

## 2019-08-20 PROCEDURE — G8979 MOBILITY GOAL STATUS: HCPCS

## 2019-08-20 PROCEDURE — G8987 SELF CARE CURRENT STATUS: HCPCS

## 2019-08-20 PROCEDURE — G8989 SELF CARE D/C STATUS: HCPCS

## 2019-08-20 PROCEDURE — G8988 SELF CARE GOAL STATUS: HCPCS

## 2019-08-20 RX ORDER — TORSEMIDE 20 MG/1
40 TABLET ORAL DAILY
Qty: 60 TABLET | Refills: 0 | Status: SHIPPED | OUTPATIENT
Start: 2019-08-21 | End: 2019-10-10 | Stop reason: HOSPADM

## 2019-08-20 RX ORDER — POTASSIUM CHLORIDE 750 MG/1
10 TABLET, EXTENDED RELEASE ORAL DAILY
Qty: 30 TABLET | Refills: 0 | Status: SHIPPED | OUTPATIENT
Start: 2019-08-21 | End: 2019-10-10 | Stop reason: HOSPADM

## 2019-08-20 RX ADMIN — HEPARIN SODIUM 5000 UNITS: 5000 INJECTION INTRAVENOUS; SUBCUTANEOUS at 05:16

## 2019-08-20 RX ADMIN — LIDOCAINE 2 PATCH: 50 PATCH CUTANEOUS at 09:10

## 2019-08-20 RX ADMIN — METOPROLOL TARTRATE 25 MG: 25 TABLET, FILM COATED ORAL at 09:05

## 2019-08-20 RX ADMIN — GABAPENTIN 600 MG: 300 CAPSULE ORAL at 09:06

## 2019-08-20 RX ADMIN — POTASSIUM CHLORIDE 20 MEQ: 1500 TABLET, EXTENDED RELEASE ORAL at 09:06

## 2019-08-20 RX ADMIN — LEVOTHYROXINE SODIUM 112 MCG: 112 TABLET ORAL at 05:16

## 2019-08-20 RX ADMIN — DOCUSATE SODIUM 100 MG: 100 CAPSULE, LIQUID FILLED ORAL at 09:05

## 2019-08-20 RX ADMIN — TORSEMIDE 40 MG: 20 TABLET ORAL at 09:12

## 2019-08-20 RX ADMIN — HYDROXYCHLOROQUINE SULFATE 200 MG: 200 TABLET, FILM COATED ORAL at 09:08

## 2019-08-20 RX ADMIN — ACETAMINOPHEN 650 MG: 325 TABLET ORAL at 12:34

## 2019-08-20 RX ADMIN — Medication 1000 MG: at 09:06

## 2019-08-20 RX ADMIN — Medication 1 TABLET: at 09:06

## 2019-08-20 RX ADMIN — CALCIUM CARBONATE-VITAMIN D TAB 500 MG-200 UNIT 1 TABLET: 500-200 TAB at 09:04

## 2019-08-20 RX ADMIN — AMLODIPINE BESYLATE 5 MG: 5 TABLET ORAL at 09:06

## 2019-08-20 NOTE — ASSESSMENT & PLAN NOTE
Creatinine slowly worsening  1 5 today  Baseline around 1 2-1 3  Torsemide resume today as per Cardiology  Recheck BMP and 2-3 days  Continue to hold losartan for now

## 2019-08-20 NOTE — PHYSICAL THERAPY NOTE
PHYSICAL THERAPY EVALUATION          Patient Name: Nelda Harris  EDYJV'M Date: 8/20/2019  Physical Therapy Evaluation     Patient's Name: Nelda Harris    Admitting Diagnosis  SOB (shortness of breath) [R06 02]  NSTEMI (non-ST elevated myocardial infarction) ([facility] [address] ) [I21 4]  CHF exacerbation ([facility] [address] ) [I50 9]  HERLINDA (acute kidney injury) ([facility] [address] ) [N17 9]    Problem List  Patient Active Problem List   Diagnosis    TIA (transient ischemic attack)    UTI (urinary tract infection)    Hypertension    Diabetes ([facility] [address] [facility]    Hypothyroidism    HX: breast cancer    H/O: CVA (cerebrovascular accident)    Osteoporosis    Arthritis    Fibromyalgia    Bilateral malignant neoplasm of breast in female, estrogen receptor positive ([facility] [address] )    Closed nondisplaced fracture of second cervical vertebra ([facility] [address] [facility] )    Neck pain, acute    Type III fracture of odontoid process ([facility] [address] [facility] )    C6 cervical fracture ([facility] [address] [facility]    Essential hypertension    Hypothyroidism    Fall    Ambulatory dysfunction    Acute pain    Renovascular hypertension    Acute on chronic diastolic heart failure (HCC)    History of CVA (cerebrovascular accident)    Type 2 diabetes mellitus with diabetic polyneuropathy ([facility]    Depression    Stage 3 chronic kidney disease (HCC)    Rheumatoid arthritis involving multiple sites ([facility] [address] [facility] )    Fibromyalgia    History of aortic valve replacement with bioprosthetic valve    Renal artery stenosis (HCC)    Parenchymal renal hypertension    Pain    Lesion of left lung    Trigger point    Dermatitis associated with moisture    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    Elevated troponin    Acute respiratory failure with hypoxia (HCC)    Acute kidney injury ([facility] [address] [facility]    Dysphagia       Past Medical History  Past Medical History:   Diagnosis Date    Arthritis     Breast cancer ([facility] [address] [facility]     Cardiac disease     aortic valve transplant    CHF (congestive heart failure) (HCC)     Compression fracture of body of thoracic vertebra (HCC)     CVA (cerebral vascular accident) ([facility] [address] [facility]     Diabetes mellitus ([facility] [address] [facility]     Disease of thyroid gland     Fibromyalgia, primary     Hyperlipidemia     Hypertension     Neuropathy     Pressure injury of skin     Renal disorder     kidney stent    St[facility])        Past Surgical History  Past Surgical History:   Procedure Laterality Date    AORTIC VALVE SURGERY      BREAST SURGERY      lumpectomy for breast cancer    CATARACT EXTRACTION      CHOLECYSTECTOMY      HYSTERECTOMY  1978    KIDNEY SURGERY      stent placed for kidney    OTHER SURGICAL HISTORY      abdominal aneurysm surgery    [place] ARTHRODESIS POSTERIOR ATLAS-AXIS C1-C2 N/A 5/1/2019    Procedure: C1-C2 lateral mass fixation fusion with possible sublaminar cables;  Surgeon: Ella Pascual MD;  [facility];  Service: Neurosurgery    REPLACEMENT TOTAL KNEE      left         08/20/19 0946   Note Type   Note type Eval only   Pain Assessment   Pain Assessment No/denies pain   Pain Score No Pain   Home Living   Type of Home Apartment  (first floor)   Home Layout One level;Ramped entrance   [address]   Prior Function   Level of [place] Independent with ADLs and functional mobility   Lives With Daughter   Receives Help From Family   ADL Assistance Independent   IADLs Needs assistance   Falls in the last 6 months 0   Restrictions/Precautions   Weight Bearing Precautions Per Order No   General   Family/Caregiver Present No   Cognition   Overall Cognitive Status WFL   RLE Assessment   RLE Assessment WFL   LLE Assessment   LLE Assessment WFL   Coordination   Movements are Fluid and Coordinated 1   Bed Mobility   Additional Comments OOB in chair upon PT arrival   Pt left upright in bedside chair with all needs in reach at end of therapy session     Transfers   Sit to Stand 5 Supervision   Stand to Sit 5  Supervision   Additional Comments Transfers with RW   Ambulation/Elevation   Gait pattern WNL  (slow)   Gait Assistance 5  Supervision   Assistive Device Rolling walker   Distance 200 ft   Stair Management Assistance Not tested   Balance   Static Sitting Good   Dynamic Sitting Good   Static Standing Fair +   Dynamic Standing Fair   Ambulatory Fair   Endurance Deficit   Endurance Deficit No   Activity Tolerance   Activity Tolerance Patient tolerated treatment well   Medical Staff Made Aware OT Lila   Nurse Made Aware ZENAIDA Godwin cleared pt to be seen by PT   Assessment   Prognosis Good   Assessment Pt seen for moderate complexity PT evaluation due to decrease in functional mobility status compared to baseline  Pt with active PT eval/treat and ambulate pt orders at this time  Pt is a 80 y o  yo F who [facility] with SOB on 8/16/19  PMH significant for CHF, aortic stenosis, DM, HTN, RA, breast cancer, CVA, neuropathy  Pt resides with daughter in 1st floor apartment with ramp to enter and reports I PTA with use of RW  Pt requires supervision for STS and ambulation 200 ft with RW at this time  Pt left upright in bedside chair with all needs in reach  PT to D/C pt as pt is at/near baseline and recommending home with family support once medically cleared     Barriers to Discharge None   Goals   Patient Goals to go home   Recommendation   Recommendation D/C PT;Home with family support   Equipment Recommended Walker   PT - OK to Discharge Yes  (once medically cleared)   Modified Rosy Scale   Modified [place] Scale 2   Barthel Index   Feeding 10   Bathing 0   Grooming Score 5   Dressing Score 5   Bladder Score 10   Bowels Score 10   Toilet Use Score 10   Transfers (Bed/Chair) Score 10   Mobility (Level Surface) Score 10   Stairs Score 0   Barthel Index Score 70           Elli Mariee, PT

## 2019-08-20 NOTE — ASSESSMENT & PLAN NOTE
Lab Results   Component Value Date    HGBA1C 5 0 08/16/2019     No results for input(s): POCGLU in the last 72 hours  · Controlled currently  A1c is only 5    Will discontinue insulin sliding scale and blood glucose checks  · No T2DM for all practical purposes

## 2019-08-20 NOTE — ASSESSMENT & PLAN NOTE
Wt Readings from Last 3 Encounters:   08/19/19 87 1 kg (192 lb 0 3 oz)   06/25/19 88 9 kg (196 lb)   05/01/19 88 9 kg (196 lb)     · Appreciate Cardiology recommendations  Repeat 2D echocardiogram shows preserved EF with grade 2 diastolic dysfunction  Patient has history of bioprosthetic aortic valve which appears to be normally functioning  · Patient responded well to IV diuretics  She is on room air  · Fluid and sodium restriction  · torsemide started from today  · Cr  Improving today  · Pt to follow up with her cardiologist at [facility] in 2 weeks    · Repeat BMP in 2-3 days

## 2019-08-20 NOTE — ASSESSMENT & PLAN NOTE
· Continue Lopressor 25 mg b i d  · Stop losartan given increasing creatinine  · Cont amlodipine 5 mg    · Continue to monitor

## 2019-08-20 NOTE — DISCHARGE SUMMARY
Discharge- Michelle Primus 1934, 80 y o  female MRN: [id number]    Unit/Bed[facility] 507-01 Encounter: [id number]    Primary Care Provider: Kristian Garrett DO   Date and time admitted to hospital: 8/16/2019 12:33 AM        * Acute on chronic diastolic heart failure [facility])  Assessment & Plan  Wt Readings from Last 3 Encounters:   08/19/19 87 1 kg (192 lb 0 3 oz)   06/25/19 88 9 kg (196 lb)   05/01/19 88 9 kg (196 lb)     · Appreciate Cardiology recommendations  Repeat 2D echocardiogram shows preserved EF with grade 2 diastolic dysfunction  Patient has history of bioprosthetic aortic valve which appears to be normally functioning  · Patient responded well to IV diuretics  She is on room air  · Fluid and sodium restriction  · torsemide started from today  · Cr  Improving today  · Pt to follow up with her cardiologist at [facility] in 2 weeks  · Repeat BMP in 2-3 days    Essential hypertension  Assessment & Plan  · Continue Lopressor 25 mg b i d  · Stop losartan given increasing creatinine  · Cont amlodipine 5 mg  · Continue to monitor    Acute kidney injury [facility])  Assessment & Plan  Creatinine slowly worsening  1 5 today  Baseline around 1 2-1 3  Torsemide resume today as per Cardiology  Recheck BMP and 2-3 days  Continue to hold losartan for now  Acute respiratory failure with hypoxia (HCC)  Assessment & Plan  · Secondary to CHF  · Resolved for now  · Patient on room air  · O2 sat > 90% on ambulation  · Cleared for discharge from PT standpoint  Elevated troponin  Assessment & Plan  · Non MI troponin elevation 2" CHF  · No further intervention    Results from last 7 days   Lab Units 08/16/19  0721 08/16/19  0439 08/16/19  0053   TROPONIN I ng/mL 1 22* 1 24* 0 99*       History of aortic valve replacement with bioprosthetic valve  Assessment & Plan  · Repeat 2D echocardiogram this admission shows normal functioning bioprosthetic valve    No further intervention indicated at this time    Rheumatoid arthritis involving multiple sites [facility])  Assessment & Plan  · Continue hydroxychloroquine  Stable    Type 2 diabetes mellitus with diabetic polyneuropathy [facility])  Assessment & Plan  Lab Results   Component Value Date    HGBA1C 5 0 08/16/2019     No results for input(s): POCGLU in the last 72 hours  · Controlled currently  A1c is only 5  Will discontinue insulin sliding scale and blood glucose checks  · No T2DM for all practical purposes            Discharging Physician / Practitioner: Annika Terry MD  PCP: Joseph Conn DO  Admission Date:   Admission Orders (From admission, onward)     Ordered        08/16/19 0240  Inpatient Admission (expected length of stay for this patient Order details is greater than two midnights)  Once                   Discharge Date: 08/20/19    Resolved Problems  Date Reviewed: 8/20/2019    None          Consultations During Hospital Stay:  · Cardiology    Procedures Performed:   · none    Significant Findings / Test Results:     XR chest 1 view portable   ED Interpretation by Tano Valdes MD (08/16 0149)   Pulmonary edema bilateral      Final Result by Robert Hardwick MD (08/16 4330)      Mild central vascular congestion in keeping with CHF  Probable small pleural effusions  Workstation performed: [id number]              ECHOCARDIOGRAM:     SUMMARY     LEFT VENTRICLE:  Systolic function was normal by visual assessment  Ejection fraction was estimated to be 55 %  There was moderate hypokinesis of the mid anteroseptal wall(s)  Features were consistent with a pseudonormal left ventricular filling pattern, with concomitant abnormal relaxation and increased filling pressure (grade 2 diastolic dysfunction)      RIGHT VENTRICLE:  Systolic pressure was mildly increased  Estimated peak pressure was 40 mmHg      LEFT ATRIUM:  The atrium was mildly dilated      MITRAL VALVE:  There was mild to moderate annular calcification    There was mild regurgitation      AORTIC VALVE:  A bioprosthesis was present  It exhibited normal function      TRICUSPID VALVE:  There was mild regurgitation      PULMONIC VALVE:  There was trace regurgitation  Incidental Findings:   · none    Test Results Pending at Discharge (will require follow up):   · none     Outpatient Tests Requested:  · BMP in 2-3 days    Complications:  none    Reason for Admission:  Shortness of breath    Hospital Course:     Christian Meyers is a 80 y o  female patient who originally presented to the hospital on 8/16/2019 due to shortness of breath and acute hypoxic respiratory failure  This was most likely due to acute on chronic diastolic CHF  Patient was started on IV diuresis  Was slowly weaned off oxygen  Was evaluated by Cardiology  Repeat echocardiogram was done which showed ejection fraction of 55% and grade 2 diastolic dysfunction  She has history of bioprosthetic aortic valve which appeared normal on the echocardiogram     Patient has not been weaned off oxygen  IV diuresis was held for to 2 days because of slowly increasing creatinine  Now stable and trending down  Has been transitioned to p o  Torsemide as per Cardiology  Will need repeat BMP in 2-3 days after discharge  Outpatient follow-up with her cardiologist at [facility]  Please see above list of diagnoses and related plan for additional information  Condition at Discharge: good     Discharge Day Visit / Exam:     Subjective:  Pt seen and examined by me this morning  Pt denied any complaints  No shortness of breath  Feeling much better      Vitals: Blood Pressure: 144/65 (08/20/19 0700)  Pulse: 59 (08/20/19 0700)  Temperature: 97 6 °F (36 4 °C) (08/20/19 0700)  Temp Source: Oral (08/20/19 0700)  Respirations: 16 (08/20/19 0700)  Height: 4' 11" (149 9 cm) (08/16/19 0045)  Weight - Scale: 87 1 kg (192 lb 0 3 oz) (08/19/19 0600)  SpO2: 91 % (08/20/19 0700)     Exam:   Physical Exam    Constitutional: Pt appears well-developed and well-nourished  Not in any acute distress  Cardiovascular: Normal rate, regular rhythm, normal heart sounds  Exam reveals no gallop and no friction rub  No murmur heard  mild bilateral lower extremity pitting edema  Pulmonary/Chest: Effort normal and breath sounds normal  No respiratory distress  Pt has no wheezes or rales  Abdominal: Soft  Non-distended, Non-tender  Bowel sounds are normal    Musculoskeletal: Normal range of motion  Neurological: alert and oriented to person, place, and time  Normal strength and sensations  Psychiatric: normal mood and affect  Discussion with Family: pt  She said she will call her daughter    Discharge instructions/Information to patient and family:   See after visit summary for information provided to patient and family  Provisions for Follow-Up Care:  See after visit summary for information related to follow-up care and any pertinent home health orders  Disposition:     Home    For Discharges to [facility] SNF:   · Not Applicable to this Patient - Not Applicable to this Patient    Planned Readmission: no     Discharge Statement:  I spent 30 minutes discharging the patient  This time was spent on the day of discharge  I had direct contact with the patient on the day of discharge  Greater than 50% of the total time was spent examining patient, answering all patient questions, arranging and discussing plan of care with patient as well as directly providing post-discharge instructions  Additional time then spent on discharge activities  Discharge Medications:  See after visit summary for reconciled discharge medications provided to patient and family        ** Please Note: This note has been constructed using a voice recognition system **

## 2019-08-20 NOTE — UTILIZATION REVIEW
Continued Stay Review    Date: 8/20/19 Tuesday                      Current Patient Class: INPATIENT    Current Level of Care:  MED SURG      HPI:   79 y/o female with Hx HTN, CHF, aortic stenosis s/p bioprosthetic AVR replacement, type 2 DM, RA +  Hypothyroidism -  presented to [facility] ED 2nd 2 day hx worsening SOB at rest with wheezing and unable to lie flat and  leg swelling  In ED significant respiratory distress requiring Bipap + IV nitro given  Trop 0 99, CXR revealed evidence of pulmonary vascular congestion   TX - IV Hep gtt, brilanta and asa, IV lasix given  On exam:  decreased breath sounds b/l with b/l apical expiratory wheezing and bibasilar rales  Dx:  Acute respiratory failure with hypoxia / Acute heart failure / possible NSTEMI  Admitted INPATIENT 8/16/19 AT 0240 to Level 2 Step-down  Was weaned to O2 3 lpm nc, but respiratory distress returned + Bipap re-initiated, with improvement in respiratory status  CURRENT ASSESSMENT/ PLAN:  * Acute on chronic diastolic heart failure (HCC)  Assessment & Plan  08/19/19 87 1 kg (192 lb 0 3 oz)   06/25/19 88 9 kg (196 lb)   05/01/19 88 9 kg (196 lb)      · Appreciate Cardiology recommendations  Repeat 2D echocardiogram shows preserved EF with grade 2 diastolic dysfunction  Patient has history of bioprosthetic aortic valve which appears to be normally functioning  · Patient responded well to IV diuretics  She is on room air  · Fluid and sodium restriction  · torsemide started from today  · Cr  Improving today  · Pt to follow up with her cardiologist at [facility] in 2 weeks  · Repeat BMP in 2-3 days     Essential hypertension  Assessment & Plan  · Continue Lopressor 25 mg b i d  · Stop losartan given increasing creatinine  · Cont amlodipine 5 mg  · Continue to monitor     Acute kidney injury [facility])  Assessment & Plan  Creatinine slowly worsening  1 5 today  Baseline around 1 2-1 3  Torsemide resume today as per Cardiology    Recheck BMP and 2-3 days  Continue to hold losartan for now      Acute respiratory failure with hypoxia [facility])  Assessment & Plan  · Secondary to CHF  · Resolved for now  · Patient on room air  · O2 sat > 90% on ambulation  · Cleared for discharge from PT standpoint    ·   Pertinent Labs/Diagnostic Results:   Results from last 7 days   Lab Units 08/19/19  0553 08/18/19  0538 08/17/19  0559 08/16/19  0439 08/16/19  0214 08/16/19  0053   WBC Thousand/uL 5 61 5 32 6 73 8 05 8 16 10 13   HEMOGLOBIN g/dL 10 4* 11 0* 9 9* 10 1* 10 2* 11 5   HEMATOCRIT % 32 7* 37 4 32 6* 31 7* 32 5* 36 3   PLATELETS Thousands/uL 194 154 169 172 170 196   NEUTROS ABS Thousands/µL 2 47 2 71  --   --   --  6 45     Results from last 7 days   Lab Units 08/20/19  0454 08/19/19  0553 08/18/19  0531 08/17/19  0559 08/16/19  0439   SODIUM mmol/L 138 138 133* 134* 137   POTASSIUM mmol/L 4 0 3 7 3 9 3 9 4 2   CHLORIDE mmol/L 102 98* 100 99* 102   CO2 mmol/L 29 32 22 28 27   ANION GAP mmol/L 7 8 11 7 8   BUN mg/dL 43* 40* 41* 33* 33*   CREATININE mg/dL 1 50* 1 62* 1 59* 1 28 1 28   EGFR ml/min/1 73sq m 32 29 29 38 38   CALCIUM mg/dL 9 3 9 3 9 5 9 2 9 1   MAGNESIUM mg/dL  --   --  2 5  --  2 1     Results from last 7 days   Lab Units 08/17/19  1056 08/17/19  0711 08/16/19  2104 08/16/19  1648 08/16/19  1328 08/16/19  0558   POC GLUCOSE mg/dl 122 144* 115 135 158* 147*     Results from last 7 days   Lab Units 08/16/19  0721 08/16/19  0439 08/16/19  0053   TROPONIN I ng/mL 1 22* 1 24* 0 99*     Results from last 7 days   Lab Units 08/16/19  0928 08/16/19  0214   PROTIME seconds  --  14 3   INR   --  1 15   PTT seconds 75* 173*     Results from last 7 days   Lab Units 08/16/19 0053   NT-PRO BNP pg/mL 17,249*     Vital Signs:   Blood Pressure: 144/65 (08/20/19 0700)  Pulse: 59 (08/20/19 0700)  Temperature: 97 6 °F (36 4 °C) (08/20/19 0700)  Temp Source: Oral (08/20/19 0700)  Respirations: 16 (08/20/19 0700)  Height: 4' 11" (149 9 cm) (08/16/19 0045)  Weight - Scale: 87 1 kg (192 lb 0 3 oz) (08/19/19 0600)  SpO2: 91 % (08/20/19 0700)     I/O    08/19 0701 08/20 0700 08/20 0701 08/21 0700   P  O  370 580   Total Intake(mL/kg) 370 (4 2) 580 (6 7)   Urine (mL/kg/hr) 1486 (0 7) 454 (0 8)   Stool 0 0   Total Output 1486 454   Net -1116 +126        Unmeasured Urine Occurrence  2 x   Unmeasured Stool Occurrence 1 x 1 x     Diet Cardiovascular; Sodium 2 GM; Fluid Restriction 1800 ML     Medications:   Scheduled Meds:   Current Facility-Administered Medications:  acetaminophen 650 mg Oral Q6H PRN  GIVEN X 2/ 24 HRS   albuterol 2 puff Inhalation Q4H PRN   amLODIPine 5 mg Oral Daily   calcium carbonate-vitamin D 1 tablet Oral BID With Meals   docusate sodium 100 mg Oral Daily   fish oil 1,000 mg Oral Daily   gabapentin 600 mg Oral TID   heparin (porcine) 5,000 Units Subcutaneous Q8H [address]   hydroxychloroquine 200 mg Oral Daily   levothyroxine 112 mcg Oral Early Morning   lidocaine 2 patch Topical Daily   methocarbamol 500 mg Oral TID PRN   metoprolol tartrate 25 mg Oral BID   multivitamin-minerals 1 tablet Oral Daily   nitroglycerin 0 4 mg Sublingual Q5 Min PRN   nystatin 1 application Topical TID   potassium chloride 20 mEq Oral Daily   sertraline 25 mg Oral HS   torsemide 40 mg Oral Daily     Discharge Plan:   [address] WHEN MEDICALLY CLEARED  CASE MANAGEMENT FOLLOWING CLOSELY FOR ALL DISCHARGE NEEDS    Network Utilization Review Department  Phone: [phone]; Fax [phone]  [email or web address]  ATTENTION: Please call with any questions or concerns to [phone]  and carefully listen to the prompts so that you are directed to the right person  Send all requests for admission clinical reviews, approved or denied determinations and any other requests to fax [phone]   All voicemails are confidential

## 2019-08-20 NOTE — OCCUPATIONAL THERAPY NOTE
[address]  Evaluation     Patient Name: Silvestre Osborne  LKZNF'Y Date: 8/20/2019  Problem List  Patient Active Problem List   Diagnosis    TIA (transient ischemic attack)    UTI (urinary tract infection)    Hypertension    Diabetes ([address] )    Hypothyroidism    HX: breast cancer    H/O: CVA (cerebrovascular accident)    Osteoporosis    Arthritis    Fibromyalgia    Bilateral malignant neoplasm of breast in female, estrogen receptor positive ([address] )    Closed nondisplaced fracture of second cervical vertebra (HCC)    Neck pain, acute    Type III fracture of odontoid process ([address] )    C6 cervical fracture ([address] )    Essential hypertension    Hypothyroidism    Fall    Ambulatory dysfunction    Acute pain    Renovascular hypertension    Acute on chronic diastolic heart failure (HCC)    History of CVA (cerebrovascular accident)    Type 2 diabetes mellitus with diabetic polyneuropathy [facility])    Depression    Stage 3 chronic kidney disease ([facility])    Rheumatoid arthritis involving multiple sites ([address] )    Fibromyalgia    History of aortic valve replacement with bioprosthetic valve    Renal artery stenosis ([facility])    Parenchymal renal hypertension    Pain    Lesion of left lung    Trigger point    Dermatitis associated with moisture    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    Elevated troponin    Acute respiratory failure with hypoxia ([facility])    Acute kidney injury ([address] )    Dysphagia     Past Medical History  Past Medical History:   Diagnosis Date    Arthritis     Breast cancer ([address] )     Cardiac disease     aortic valve transplant    CHF (congestive heart failure) ([facility]     Compression fracture of body of thoracic vertebra ([facility])     CVA (cerebral vascular accident) ([address] )     Diabetes mellitus ([address] )     Disease of thyroid gland     Fibromyalgia, primary     Hyperlipidemia     Hypertension     Neuropathy     Pressure injury of skin     Renal disorder     kidney stent    Stroke [facility])      Past Surgical History  Past Surgical History:   Procedure Laterality Date    AORTIC VALVE SURGERY      BREAST SURGERY      lumpectomy for breast cancer    CATARACT EXTRACTION      CHOLECYSTECTOMY      HYSTERECTOMY  1978    KIDNEY SURGERY      stent placed for kidney    OTHER SURGICAL HISTORY      abdominal aneurysm surgery    UT ARTHRODESIS POSTERIOR ATLAS-AXIS C1-C2 N/A 5/1/2019    Procedure: C1-C2 lateral mass fixation fusion with possible sublaminar cables;  Surgeon: Keila Matt MD;  [facility]: [facility];  Service: Neurosurgery    REPLACEMENT TOTAL KNEE      left          08/20/19 0945   Note Type   Note type Eval only   Restrictions/Precautions   Weight Bearing Precautions Per Order No   Other Precautions Telemetry   Pain Assessment   Pain Assessment No/denies pain   Pain Score No Pain   Home Living   Type of Home Apartment   Home Layout One level;Ramped entrance   Bathroom Shower/Tub Walk-in shower   Bathroom Toilet Raised   Bathroom Equipment Grab bars in shower;Grab bars around toilet; Shower chair   Bathroom Accessibility Accessible via walker   [address] [facility], RW tray   Prior Function   Level of [place] Independent with ADLs and functional mobility   Lives With Daughter   Receives Help From Family   ADL Assistance Independent   IADLs Needs assistance   Falls in the last 6 months 0   Vocational Retired   Lifestyle   Autonomy I ADL's/daughter and granddaughter assist with IADL's, +RW functional ambulation and walker tray   Reciprocal Relationships daughter, granddaughter   Service to Others retired   Intrinsic Gratification coloring   Psychosocial   Psychosocial ([address] [facility]) [facility]   Where Assessed Chair   Eating Assistance 7  Independent   Grooming Assistance 7  Independent   UB Bathing Assistance 5  [address]  [address] 5  Supervision/Setup   LB Dressing Assistance 4  Minimal Assistance   Toileting Assistance  5  Supervision/Setup   Bed Mobility   Additional Comments pt in chair as received with all needs met upon departure   Transfers   Sit to Stand 5  Supervision   Stand to Sit 5  Supervision   Toilet transfer 3  Moderate assistance   Additional items Standard toilet  (+grab bar S simulated higher toilet)   Functional Mobility   Functional Mobility 5  Supervision   Additional Comments S with RW household distance functional mobility   Balance   Static Sitting Good   Dynamic Sitting Fair +   Static Standing Fair +   Dynamic Standing Fair   Ambulatory Fair   Activity Tolerance   Activity Tolerance Patient tolerated treatment well   Medical Staff Made Aware PT   Nurse Made Aware Rn cleared pt for therapy   RUE Assessment   RUE Assessment WFL   LUE Assessment   LUE Assessment WFL   Hand Function   Gross Motor Coordination Functional   Fine Motor Coordination Functional   Vision-Basic Assessment   Current Vision Wears glasses only for reading   Vision - Complex Assessment   Acuity   (hallway exit)   Cognition   Overall Cognitive Status [facility]   Arousal/Participation Alert; Cooperative   Attention Within functional limits   Orientation Level Oriented X4   Memory Within functional limits   Following Commands Follows all commands and directions without difficulty   Comments pt demonstrated good safety with RW   Assessment   Limitation Decreased high-level ADLs; Decreased endurance;Decreased ADL status   Prognosis Fair   Assessment Pt is a 80 y o  female who was admitted to [facility] on 8/16/2019 with SOB approx~2 days,  Acute on chronic diastolic heart failure (HCC) , elevated troponin's, ARF with hypoxia, HERLINDA, RA, diabetes, HTN    Pt's problem list also includes PMH of TIA, UTI, HTN, diabetes, history of breast cancer, arthritis, neck pain acute, C 4 fracture   At baseline pt was completing I ADL's, assistance with IADL's from family with driving/meals Pt lives with daughter in a 1st floor apartment and ramp to entrance Currently pt requires min a  for overall ADLS and S with RW for functional mobility/transfers  Pt currently presents with impairments in the following categories -difficulty performing ADLS and difficulty performing IADLS  activity tolerance and standing balance/tolerance  These impairments, as well as pt's fatigue  limit pt's ability to safely engage in all baseline areas of occupation, includingdressing, laundry , house maintenance, medication management, meal prep and cleaning From OT standpoint, recommend home with family support upon D/C  OT will continue to follow to address the below stated goals      Goals   Patient Goals go home today   Recommendation   OT Discharge Recommendation Home with family support   OT - OK to Discharge   (when medically cleared)   Barthel Index   Feeding 10   Bathing 0   Grooming Score 5   Dressing Score 5   Bladder Score 10   Bowels Score 10   Toilet Use Score 10   Transfers (Bed/Chair) Score 10   Mobility (Level Surface) Score 10   Stairs Score 0   Barthel Index Score 70   Modified [place] Scale   Modified [place]y Scale 2     Suzanna Yen MOT, OTR/L

## 2019-08-20 NOTE — ASSESSMENT & PLAN NOTE
· Secondary to CHF  · Resolved for now  · Patient on room air  · O2 sat > 90% on ambulation  · Cleared for discharge from PT standpoint

## 2019-08-21 NOTE — UTILIZATION REVIEW
Notification of Discharge  This is a Notification of Discharge from our facility [address]  Please be advised that this patient has been discharge from our facility  Below you will find the admission and discharge date and time including the patients disposition  PRESENTATION DATE: 8/16/2019 12:33 AM  OBS ADMISSION DATE:   IP ADMISSION DATE: 8/16/19 0240   DISCHARGE DATE: 8/20/2019  3:55 PM  DISPOSITION: Home/Self Care Home/Self Care   Admission Orders listed below:  Admission Orders (From admission, onward)     Ordered        08/16/19 0240  Inpatient Admission (expected length of stay for this patient Order details is greater than two midnights)  Once                   Please contact the UR Department if additional information is required to close this patient's authorization/case  [address]  Utilization Review Department  Phone: [phone]; Fax [phone]  [email or web address]  ATTENTION: Please call with any questions or concerns to [phone]  and carefully listen to the prompts so that you are directed to the right person  Send all requests for admission clinical reviews, approved or denied determinations and any other requests to fax [phone]   All voicemails are confidential

## 2019-08-22 ENCOUNTER — PATIENT OUTREACH (OUTPATIENT)
Dept: CARDIOLOGY CLINIC | Facility: CLINIC | Age: 84
End: 2019-08-22

## 2019-08-22 NOTE — PROGRESS NOTES
HF Post hospital discharge call template:    Self management/teach back:  Primary learner: Patient   Following low sodium diet: Yes  Weighing daily: Yes    - Weight today: 190#  Monitoring symptoms: Yes  Any current symptoms: No  Knows when to call provider: Yes  Taking all medication as prescribed: Yes  Knows name of diuretic: Yes    Care Coordination:  Aware of cardiology follow up appointment: Yes, on 9/3  Aware of PCP follow up appointment: SANDRA- Daughter spoke with PCP office and is waiting to hear back  Home Health Care provider has contacted patient: SANDRA  Home Health RN (name) Contacted: SANDRA  Additional comments: Patient has great support- she lives with her daughter and family   Great engagement for self management of her HF

## 2019-09-10 ENCOUNTER — TRANSCRIBE ORDERS (OUTPATIENT)
Dept: ADMINISTRATIVE | Facility: HOSPITAL | Age: 84
End: 2019-09-10

## 2019-09-10 DIAGNOSIS — C50.912 BILATERAL MALIGNANT NEOPLASM OF BREAST IN FEMALE, UNSPECIFIED ESTROGEN RECEPTOR STATUS, UNSPECIFIED SITE OF BREAST (HCC): Primary | ICD-10-CM

## 2019-09-10 DIAGNOSIS — C50.911 BILATERAL MALIGNANT NEOPLASM OF BREAST IN FEMALE, UNSPECIFIED ESTROGEN RECEPTOR STATUS, UNSPECIFIED SITE OF BREAST (HCC): Primary | ICD-10-CM

## 2019-09-17 ENCOUNTER — HOSPITAL ENCOUNTER (OUTPATIENT)
Dept: MAMMOGRAPHY | Facility: CLINIC | Age: 84
Discharge: HOME | End: 2019-09-17
Payer: COMMERCIAL

## 2019-09-17 VITALS — HEIGHT: 59 IN | BODY MASS INDEX: 38.1 KG/M2 | WEIGHT: 189 LBS

## 2019-09-17 DIAGNOSIS — C50.911 BILATERAL MALIGNANT NEOPLASM OF BREAST IN FEMALE, UNSPECIFIED ESTROGEN RECEPTOR STATUS, UNSPECIFIED SITE OF BREAST (HCC): ICD-10-CM

## 2019-09-17 DIAGNOSIS — C50.912 BILATERAL MALIGNANT NEOPLASM OF BREAST IN FEMALE, UNSPECIFIED ESTROGEN RECEPTOR STATUS, UNSPECIFIED SITE OF BREAST (HCC): ICD-10-CM

## 2019-09-17 PROCEDURE — 77066 DX MAMMO INCL CAD BI: CPT

## 2019-09-17 PROCEDURE — G0279 TOMOSYNTHESIS, MAMMO: HCPCS

## 2019-09-20 ENCOUNTER — APPOINTMENT (OUTPATIENT)
Dept: RADIOLOGY | Age: 84
End: 2019-09-20
Payer: COMMERCIAL

## 2019-09-20 DIAGNOSIS — Z98.1 S/P CERVICAL SPINAL FUSION: ICD-10-CM

## 2019-09-20 PROCEDURE — 72052 X-RAY EXAM NECK SPINE 6/>VWS: CPT

## 2019-09-24 ENCOUNTER — OFFICE VISIT (OUTPATIENT)
Dept: NEUROSURGERY | Facility: CLINIC | Age: 84
End: 2019-09-24
Payer: COMMERCIAL

## 2019-09-24 VITALS
BODY MASS INDEX: 38.51 KG/M2 | TEMPERATURE: 96.8 F | HEIGHT: 59 IN | WEIGHT: 191 LBS | DIASTOLIC BLOOD PRESSURE: 50 MMHG | SYSTOLIC BLOOD PRESSURE: 115 MMHG | RESPIRATION RATE: 16 BRPM | HEART RATE: 59 BPM

## 2019-09-24 DIAGNOSIS — Z98.1 S/P CERVICAL SPINAL FUSION: Primary | ICD-10-CM

## 2019-09-24 PROCEDURE — 99213 OFFICE O/P EST LOW 20 MIN: CPT | Performed by: NEUROLOGICAL SURGERY

## 2019-09-24 NOTE — PROGRESS NOTES
[facility] Office Note  Grace Osborn is a 80 y o  female      Type of Visit: Follow-up      Diagnoses and all orders for this visit:    S/P cervical spinal fusion  -     Ambulatory referral to Physical Therapy; Future        DISCUSSION SUMMARY  79-year-old female status post a C1-C2 fixation fusion  Her instrumentation appears to be in good position  She appears to be doing well  She does have a complaint about a muscle spasm in the cervical erector complex and although she received physical therapy she did not receive physical therapy for range of motion and strengthening of her neck  I believe this is in order  Return if symptoms worsen or fail to improve  CHIEF COMPLAINT  Patient presents for 3 month follow up with x-rays    NEUROSURGERY PROCEDURES  5/1/19 (DKO) C1-C2 lateral mass fixation fusion with possible sublaminar cables    HISTORY OF PRESENT ILLNESS  This patient is a right-handed female who presented to the outpatient office for follow up of a C2 fracture with anterolisthesis s/p fall in January  Patient underwent C1-2 lateral mass fixation fusion with sublaminar cables under general anesthesia with minimal blood loss and no complications  Follow-up imaging studies are essential to ensure that the fusion continues  In general she feels that she is doing very well  In she has not wearing a collar  She does complain of some pain in the right lateral neck radiating from the mid neck to the occiput  This sometimes produces pain behind year  She says she can feel the spasm of the muscle  REVIEW OF SYSTEMS  Review of Systems   Constitutional: Positive for activity change (limited neck ROM; unchanged)  HENT: Negative  Eyes: Positive for visual disturbance (double vision when looking to the right; since her fall)  Respiratory: Negative  Cardiovascular: Negative  Gastrointestinal: Negative  Endocrine: Negative  Genitourinary: Negative      Musculoskeletal: Positive for gait problem (using a walker) and neck stiffness (unchanged)  Negative for neck pain (soreness coming from the right side of the neck into the ear and then she will have earache; at times it travels all the way up into the head)  Skin: Negative  Allergic/Immunologic: Negative  Hematological: Negative  Psychiatric/Behavioral: Negative  All other systems reviewed and are negative  I reviewed the ROS  I reviewed the ROS      MEDICAL HISTORY  Active Ambulatory Problems     Diagnosis Date Noted    TIA (transient ischemic attack) 09/23/2016    UTI (urinary tract infection) 09/23/2016    Hypertension 09/23/2016    Diabetes ([address] ) 09/23/2016    Hypothyroidism 09/23/2016    HX: breast cancer 09/23/2016    H/O: CVA (cerebrovascular accident) 09/23/2016    Osteoporosis 09/23/2016    Arthritis 09/23/2016    Fibromyalgia 09/23/2016    Bilateral malignant neoplasm of breast in female, estrogen receptor positive ([address] ) 07/10/2018    Closed nondisplaced fracture of second cervical vertebra ([facility] [facility] ) 01/09/2019    Neck pain, acute 01/09/2019    Type III fracture of odontoid process ([address] ) 01/09/2019    C6 cervical fracture ([address] ) 01/09/2019    Essential hypertension 01/09/2019    Hypothyroidism 01/09/2019    Fall 01/10/2019    Ambulatory dysfunction 01/10/2019    Acute pain 01/10/2019    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    Acute on chronic diastolic heart failure ([address] ) 01/17/2019    History of CVA (cerebrovascular accident) 01/17/2019    Type 2 diabetes mellitus with diabetic polyneuropathy ([address] ) 01/17/2019    Depression 01/17/2019    Stage 3 chronic kidney disease ([address] ) 01/17/2019    Rheumatoid arthritis involving multiple sites ([address] ) 01/17/2019    Fibromyalgia 01/17/2019    History of aortic valve replacement with bioprosthetic valve 01/17/2019    Renal artery stenosis ([address] ) 01/24/2019    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    Pain 01/29/2019    Lesion of left lung 01/29/2019    Trigger point 02/05/2019    Dermatitis associated with moisture 02/05/2019    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 04/02/2019    Elevated troponin 08/16/2019    Acute respiratory failure with hypoxia ([facility] [address] ) 08/16/2019    Acute kidney injury ([facility] [address] ) 08/16/2019    Dysphagia 08/16/2019     Resolved Ambulatory Problems     Diagnosis Date Noted    Forgetfulness 01/10/2019    Physical deconditioning 01/10/2019    Delirium 01/11/2019    Hyponatremia 01/21/2019     Past Medical History:   Diagnosis Date    Breast cancer ([facility] [facility] ) 2015    Cardiac disease     CHF (congestive heart failure) ([facility] [facility] )     Compression fracture of body of thoracic vertebra (HCC)     CVA (cerebral vascular accident) ([facility] [facility] )     Diabetes mellitus ([facility] [address] )     Disease of thyroid gland     Fibromyalgia, primary     Hyperlipidemia     Neuropathy     Pressure injury of skin     Renal disorder     Stroke [facility])        Past Surgical History:   Procedure Laterality Date    AORTIC VALVE SURGERY      BREAST LUMPECTOMY Right     BREAST LUMPECTOMY Left     BREAST SURGERY      lumpectomy for breast cancer    CATARACT EXTRACTION      CHOLECYSTECTOMY      HYSTERECTOMY  1978    KIDNEY SURGERY      stent placed for kidney    OTHER SURGICAL HISTORY      abdominal aneurysm surgery    TN ARTHRODESIS POSTERIOR ATLAS-AXIS C1-C2 N/A 5/1/2019    Procedure: C1-C2 lateral mass fixation fusion with possible sublaminar cables;  Surgeon: Eloy Purcell MD;  [facility];  Service: Neurosurgery    REPLACEMENT TOTAL KNEE      left        Social History     Tobacco Use   Smoking Status Never Smoker   Smokeless Tobacco Never Used       Social History     Substance and Sexual Activity   Alcohol Use Not Currently       Social History     Substance and Sexual Activity   Drug Use No       Vitals:    09/24/19 1145   BP: 115/50   BP Location: Right arm   Patient Position: Sitting Cuff Size: Adult   Pulse: 59   Resp: 16   Temp: (!) 96 8 °F (36 °C)   TempSrc: Tympanic   Weight: 86 6 kg (191 lb)   Height: 4' 11" (1 499 m)         Current Outpatient Medications:     acetaminophen (TYLENOL) 325 mg tablet, Take 2 tablets (650 mg total) by mouth every 6 (six) hours as needed for mild pain (Patient taking differently: Take 1,000 mg by mouth 3 (three) times a day as needed for mild pain ), Disp: 30 tablet, Rfl: 0    albuterol (PROVENTIL HFA,VENTOLIN HFA) 90 mcg/act inhaler, Inhale 2 puffs every 4 (four) hours as needed for wheezing, Disp: , Rfl:     amLODIPine (NORVASC) 5 mg tablet, Take 5 mg by mouth every morning , Disp: , Rfl:     calcium carbonate-vitamin D (OSCAL-D) 500 mg-200 units per tablet, Take 1 tablet by mouth 2 (two) times a day with meals  , Disp: , Rfl:     clopidogrel (PLAVIX) 75 mg tablet, Take 75 mg by mouth daily, Disp: , Rfl:     gabapentin (NEURONTIN) 300 mg capsule, Take 2 capsules PO three times a day, Disp: 60 capsule, Rfl: 0    hydroxychloroquine (PLAQUENIL) 200 mg tablet, Take 200 mg by mouth daily , Disp: , Rfl:     Levothyroxine Sodium 112 MCG CAPS, Take by mouth daily, Disp: , Rfl:     lidocaine (LIDODERM) 5 %, Apply 2 patches topically daily Remove & Discard patch within 12 hours or as directed by MD  , Disp: , Rfl:     methocarbamol (ROBAXIN) 500 mg tablet, Take 1 tablet (500 mg total) by mouth 3 (three) times a day as needed for muscle spasms, Disp: 90 tablet, Rfl: 0    metoprolol tartrate (LOPRESSOR) 25 mg tablet, Take 25 mg by mouth 2 (two) times a day , Disp: , Rfl:     Multiple Vitamins-Calcium (MULTI-DAY/CALCIUM/EXTRA IRON PO), Take 1 tablet by mouth daily, Disp: , Rfl:     Multiple Vitamins-Minerals (CENTRUM ADULTS PO), Centrum, Disp: , Rfl:     nitroglycerin (NITROSTAT) 0 4 mg SL tablet, Place under the tongue every 5 (five) minutes as needed for chest pain , Disp: , Rfl:     nystatin (MYCOSTATIN) powder, Apply topically as needed , Disp: , Rfl:   Omega-3 Fatty Acids (FISH OIL) 1,000 mg, Fish Oil 1,000 mg capsule, Disp: , Rfl:     potassium chloride (K-DUR,KLOR-CON) 10 mEq tablet, Take 1 tablet (10 mEq total) by mouth daily, Disp: 30 tablet, Rfl: 0    sertraline (ZOLOFT) 25 mg tablet, 25 mg daily at bedtime , Disp: , Rfl:     torsemide (DEMADEX) 20 mg tablet, Take 2 tablets (40 mg total) by mouth daily, Disp: 60 tablet, Rfl: 0     Allergies   Allergen Reactions    Duloxetine Hcl Other (See Comments) and Hypertension    Duloxetine Hcl      Cymbalta; Hemorrhagic stroke listed as reaction    Escitalopram      Other reaction(s): Urinary Retention    Pregabalin      Other reaction(s): Hypertension    Statins Myalgia    Tramadol      Other reaction(s): Hypertension    Triprolidine-Pse Other (See Comments)    Anastrozole Abdominal Pain    Anastrozole     Antihistamines, Diphenhydramine-Type     Exemestane      Aromasin; Muscle pain & cramps    Lexapro [Escitalopram]      Urinary retention    Lyrica [Pregabalin]      hypertension    Metformin      diarrhea    Oxycodone     Statins      Muscle pain & cramps    Tramadol      hypertension    Triprolidine-Pseudoephedrine     Metformin Diarrhea        The following portions of the patient's history were updated by MA and reviewed by MD: allergies, current medications, past family history, past medical history, past social history, past surgical history and problem list       Physical Exam  Awake alert  Extraocular movements are intact  Her power is 5/5 bilaterally  There is a muscle spasm in the right lateral neck which is palpable in very different than that which is present on the left  Her incision is clean and dry and well healed  Her ambulation station and gait are all at baseline  She uses a walker for ambulation  Power in the upper extremities is 5/5 bilaterally  She does not extinguish to double simultaneous stimulation      RESULTS/DATA  X-rays of the cervical spine are carefully reviewed  These are flexion-extension as well as obliques an AP and lateral films which demonstrate the instrumentation to be in good position and no loosening or breakage

## 2019-10-01 ENCOUNTER — EVALUATION (OUTPATIENT)
Dept: PHYSICAL THERAPY | Age: 84
End: 2019-10-01
Payer: COMMERCIAL

## 2019-10-01 VITALS — SYSTOLIC BLOOD PRESSURE: 132 MMHG | DIASTOLIC BLOOD PRESSURE: 66 MMHG

## 2019-10-01 DIAGNOSIS — Z98.1 S/P CERVICAL SPINAL FUSION: ICD-10-CM

## 2019-10-01 PROCEDURE — 97163 PT EVAL HIGH COMPLEX 45 MIN: CPT

## 2019-10-01 NOTE — PROGRESS NOTES
PT Evaluation     Today's date: 10/1/2019  Patient name: Winnie Florence  : 1934  MRN: [id number]  Referring provider: Shaji Anderson MD  Dx:   Encounter Diagnosis     ICD-10-CM    1  S/P cervical spinal fusion Z98 1 Ambulatory referral to Physical Therapy                  Assessment  Assessment details: Patient is an 80 y o  Female reporting to PT nearly 5 months S/P cervical fusion  She presents with severely limited cervical AROM in all planes, decreased shoulder AROM B/L, poor posture, and pain with head movements  No red flags or abnormal neurological signs present  At this time, patient will benefit from skilled PT intervention to address the impairments listed  Impairments: abnormal or restricted ROM, abnormal movement, activity intolerance, impaired physical strength, lacks appropriate home exercise program, pain with function, poor posture  and poor body mechanics    Symptom irritability: moderateBarriers to therapy: None  Understanding of Dx/Px/POC: good   Prognosis: good    Goals  STG's: 4 Weeks  1 ) Patient will be independent with HEP   2 ) Patient will be able to reach for a overhead with <3/10 pain  3 ) Patient will be able to don/doff jacket without difficulty  4 ) Patient will be able to reach behind their back with <3/10 pain  5 ) Patient will be able to look over her shoulder without discomfort  LTG's: 8 Weeks  1 ) Patient will demonstrate 5/5 shoulder strength B/L in all planes, which will demonstrate ability to perform ADL's normally without restriction  2 ) Patient will demonstrate full cervical AROM in all planes  3 ) Patient will be able to lift 5-10 lbs overhead with <3/10 pain  4 ) Patient will be able to perform work and/or recreational ADL's without difficulty  5 ) Patient will exceed predicted FOTO score  6 ) Patient will be able to perform all household chores without difficulty  Plan  Plan details: Patient was provided with an HEP   They were educated regarding repetitions, resistance, and proper technique  Patient demonstrated understanding verbally  Patient was educated regarding the etiology and pathomechanics of their condition  They demonstrated understanding verbally  Patient would benefit from: skilled physical therapy  Planned therapy interventions: manual therapy, patient education, postural training, therapeutic activities, stretching, strengthening, therapeutic exercise, home exercise program, functional ROM exercises, flexibility and body mechanics training  Frequency: 2x week  Duration in weeks: 8  Treatment plan discussed with: family and patient        Subjective Evaluation    History of Present Illness  Mechanism of injury: Patient is an 80 y o  Female reporting to PT following cervical fusion on   Patient needed surgery to stabilize C2-C3 after a fall in [facility], which resulted in a fracture  She was placed in a cervical collar for the next few months, but the x-rays showed insufficient healing  Patient was then scheduled for surgery  She reports no complications or adverse reactions following surgery  Patient was seen by PT in July where treatment focused on LE strengthening and balance  She was recently seen by neurosurgery for 3 month follow-up where patient was referred for PT specifically to address UE strengthening and cervical AROM  Patient continues to experience significant stiffness and pressure throughout the posterior aspect of her neck  Sx's will extend superiorly into the base of the skull causing HA's  She denies dysphagia, dysarthria, or presence of radicular sx's down either arm  Patient is significantly limited in her ability to turn her head or look up due to stiffness  She also expresses balance concerns, which she attests to her inability to properly turn her head             Recurrent probem    Quality of life: good    Pain  Current pain ratin  At best pain ratin  At worst pain ratin  Location: Neck Quality: tight, discomfort, cramping, pulling and pressure  Relieving factors: relaxation, support, rest and change in position  Aggravating factors: lifting and overhead activity    Social Support  Lives in: multiple-level home  Lives with: adult children and spouse      Diagnostic Tests  X-ray: normal  Treatments  Previous treatment: physical therapy and immobilization  Patient Goals  Patient goals for therapy: increased motion, decreased pain, increased strength and independence with ADLs/IADLs          Objective     Concurrent Complaints  Negative for night pain, disturbed sleep, dizziness, faints, headaches, nausea/motion sickness, tinnitus, trouble swallowing, difficulty breathing, shortness of breath, respiratory pain, visual change, cardiac problem, kidney problem, gallbladder problem, stomach problem, ulcer, appendix problem, spleen problem, pancreas problem, history of cancer, history of trauma and infection    Static Posture     Head  Forward      Palpation     Additional Palpation Details  Point tenderness with palpation of suboccipital region and UT muscle B/L    Neurological Testing     Sensation   Cervical/Thoracic   Left   Intact: light touch    Right   Intact: light touch    Active Range of Motion   Cervical/Thoracic Spine       Cervical    Flexion: 16 degrees   Extension: 20 degrees      Left lateral flexion: 24 degrees      Right lateral flexion: 20 degrees      Left rotation: 30 degrees  Right rotation: 24 degrees         Left Shoulder   Flexion: 140 degrees   Abduction: 120 degrees   External rotation 0°: 70 degrees   Internal rotation BTB: T12     Right Shoulder   Flexion: 140 degrees   Abduction: 120 degrees   External rotation 0°: 70 degrees   Internal rotation BTB: T12     Strength/Myotome Testing   Cervical Spine     Left   Interossei strength (t1): 5    Right   Interossei strength (t1): 5    Left Shoulder     Planes of Motion   Flexion: 4   Abduction: 4-   External rotation at 0°: 4+ Internal rotation at 0°: 5     Isolated Muscles   Upper trapezius: 5     Right Shoulder     Planes of Motion   Flexion: 4   Abduction: 4-   External rotation at 0°: 4+   Internal rotation at 0°: 5     Isolated Muscles   Upper trapezius: 5     Left Elbow   Flexion: 5  Extension: 5    Right Elbow   Flexion: 5  Extension: 5    Left Wrist/Hand   Wrist extension: 5  Wrist flexion: 5  Thumb extension: 5    Right Wrist/Hand   Wrist extension: 5  Wrist flexion: 5  Thumb extension: 5    General Comments:    Upper quarter screen   Shoulder: unremarkable  Elbow: unremarkable  Hand/wrist: unremarkable  Neuro Exam:     Headaches   Patient reports headaches: No               Precautions: *FALL RISK*, S/P Cervical Spine Fusion, HTN, HF, CKD, PmHx Breast CA, Fibromyalgia     Manual              Cervical Rotation Stretch             Cervical Extension Stretch             UT/LS Stretch                                           Exercise Diary              Pulleys             Cervical AROM (flex/ext, Rot)             UT/LS Stretch              Cervical Isometrics (6-Way)             Chin Tucks             TB Rows/Ext             DB Shoulder Flex/Ext             DB Biceps Curl             Corner Pec Stretch                                                                                                                                                                Modalities

## 2019-10-03 ENCOUNTER — OFFICE VISIT (OUTPATIENT)
Dept: PHYSICAL THERAPY | Age: 84
End: 2019-10-03
Payer: COMMERCIAL

## 2019-10-03 DIAGNOSIS — Z98.1 S/P CERVICAL SPINAL FUSION: Primary | ICD-10-CM

## 2019-10-03 PROCEDURE — 97110 THERAPEUTIC EXERCISES: CPT

## 2019-10-03 NOTE — PROGRESS NOTES
Daily Note     Today's date: 10/3/2019  Patient name: Yaima Ornelas  : 1934  MRN: [id number]  Referring provider: Lilian Ramon MD  Dx:   Encounter Diagnosis     ICD-10-CM    1  S/P cervical spinal fusion Z98 1        Start Time: 1400  Stop Time: 1500  Total time in clinic (min): 60 minutes    Subjective: Patient presents with moderate neck pain/stiffnes, 3-4/10  She has been adherent with HEP expressed verbally  Offers no additional complaints  Objective: See treatment diary below      Assessment: Patient tolerated tx very well without significant complaint  Significantly limited cervical SB and rotation AROM exhibited B/L  Tolerated stretches with overpressure  No LOB or unsteadiness with standing exercises  Patient demonstrated fatigue post treatment, exhibited good technique with therapeutic exercises and would benefit from continued PT      Plan: Continue per plan of care        Precautions: *FALL RISK*, S/P Cervical Spine Fusion, HTN, HF, CKD, PmHx Breast CA, Fibromyalgia     Manual  10/1  IE 10/3           Cervical Rotation Stretch  5'           Cervical Extension Stretch             UT/LS Stretch                                           Exercise Diary  10/1  IE 10/3           Pulleys             Cervical AROM (flex/ext, Rot)  10x10"           UT/LS Stretch   5x30"           Cervical Isometrics (flex, SB, Rot)  15x5"           Chin Tucks  20x5"           TB Rows  Green  2x15           DB Shoulder Flex/Scap  2#  2x12             DB Biceps Curl  4#  2x12                                                                                                                                                                           Modalities

## 2019-10-08 ENCOUNTER — HOSPITAL ENCOUNTER (INPATIENT)
Facility: HOSPITAL | Age: 84
LOS: 2 days | Discharge: HOME HEALTH | DRG: 086 | End: 2019-10-10
Attending: SURGERY | Admitting: SURGERY
Payer: COMMERCIAL

## 2019-10-08 ENCOUNTER — HOSPITAL ENCOUNTER (EMERGENCY)
Facility: HOSPITAL | Age: 84
End: 2019-10-08
Attending: EMERGENCY MEDICINE | Admitting: EMERGENCY MEDICINE
Payer: COMMERCIAL

## 2019-10-08 ENCOUNTER — OFFICE VISIT (OUTPATIENT)
Dept: PHYSICAL THERAPY | Age: 84
End: 2019-10-08
Payer: COMMERCIAL

## 2019-10-08 ENCOUNTER — APPOINTMENT (EMERGENCY)
Dept: CT IMAGING | Facility: HOSPITAL | Age: 84
End: 2019-10-08
Payer: COMMERCIAL

## 2019-10-08 VITALS
BODY MASS INDEX: 37.45 KG/M2 | RESPIRATION RATE: 16 BRPM | HEART RATE: 55 BPM | TEMPERATURE: 97.6 F | WEIGHT: 185.41 LBS | OXYGEN SATURATION: 94 % | SYSTOLIC BLOOD PRESSURE: 149 MMHG | DIASTOLIC BLOOD PRESSURE: 71 MMHG

## 2019-10-08 DIAGNOSIS — N18.30 STAGE 3 CHRONIC KIDNEY DISEASE (HCC): ICD-10-CM

## 2019-10-08 DIAGNOSIS — I10 HYPERTENSION, UNSPECIFIED TYPE: ICD-10-CM

## 2019-10-08 DIAGNOSIS — E03.9 HYPOTHYROIDISM, UNSPECIFIED TYPE: ICD-10-CM

## 2019-10-08 DIAGNOSIS — N17.9 ACUTE KIDNEY INJURY (HCC): ICD-10-CM

## 2019-10-08 DIAGNOSIS — E11.9 DIABETES (HCC): ICD-10-CM

## 2019-10-08 DIAGNOSIS — I50.33 ACUTE ON CHRONIC DIASTOLIC HEART FAILURE (HCC): ICD-10-CM

## 2019-10-08 DIAGNOSIS — Z98.1 S/P CERVICAL SPINAL FUSION: Primary | ICD-10-CM

## 2019-10-08 DIAGNOSIS — R26.2 AMBULATORY DYSFUNCTION: ICD-10-CM

## 2019-10-08 DIAGNOSIS — S06.5X0A TRAUMATIC SUBDURAL HEMATOMA WITHOUT LOSS OF CONSCIOUSNESS, INITIAL ENCOUNTER (HCC): ICD-10-CM
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
## 2019-10-08 DIAGNOSIS — S06.5X9A SUBDURAL HEMATOMA, ACUTE (HCC): Primary | ICD-10-CM

## 2019-10-08 DIAGNOSIS — E11.42 TYPE 2 DIABETES MELLITUS WITH DIABETIC POLYNEUROPATHY, WITHOUT LONG-TERM CURRENT USE OF INSULIN (HCC): ICD-10-CM

## 2019-10-08 LAB
ANION GAP SERPL CALCULATED.3IONS-SCNC: 8 MMOL/L (ref 4–13)
ANION GAP SERPL CALCULATED.3IONS-SCNC: 8 MMOL/L (ref 4–13)
APTT PPP: 23 SECONDS (ref 23–37)
BASOPHILS # BLD AUTO: 0.05 THOUSANDS/ΜL (ref 0–0.1)
BASOPHILS NFR BLD AUTO: 1 % (ref 0–1)
BUN SERPL-MCNC: 60 MG/DL (ref 5–25)
BUN SERPL-MCNC: 61 MG/DL (ref 5–25)
CALCIUM SERPL-MCNC: 10.4 MG/DL (ref 8.3–10.1)
CALCIUM SERPL-MCNC: 9.8 MG/DL (ref 8.3–10.1)
CHLORIDE SERPL-SCNC: 104 MMOL/L (ref 100–108)
CHLORIDE SERPL-SCNC: 99 MMOL/L (ref 100–108)
CO2 SERPL-SCNC: 31 MMOL/L (ref 21–32)
CO2 SERPL-SCNC: 31 MMOL/L (ref 21–32)
CREAT SERPL-MCNC: 2.44 MG/DL (ref 0.6–1.3)
CREAT SERPL-MCNC: 2.6 MG/DL (ref 0.6–1.3)
EOSINOPHIL # BLD AUTO: 0.08 THOUSAND/ΜL (ref 0–0.61)
EOSINOPHIL NFR BLD AUTO: 1 % (ref 0–6)
ERYTHROCYTE [DISTWIDTH] IN BLOOD BY AUTOMATED COUNT: 13.7 % (ref 11.6–15.1)
GFR SERPL CREATININE-BSD FRML MDRD: 16 ML/MIN/1.73SQ M
GFR SERPL CREATININE-BSD FRML MDRD: 18 ML/MIN/1.73SQ M
GLUCOSE SERPL-MCNC: 133 MG/DL (ref 65–140)
GLUCOSE SERPL-MCNC: 145 MG/DL (ref 65–140)
HCT VFR BLD AUTO: 34.6 % (ref 34.8–46.1)
HGB BLD-MCNC: 10.7 G/DL (ref 11.5–15.4)
IMM GRANULOCYTES # BLD AUTO: 0.05 THOUSAND/UL (ref 0–0.2)
IMM GRANULOCYTES NFR BLD AUTO: 1 % (ref 0–2)
INR PPP: 1.05 (ref 0.84–1.19)
LYMPHOCYTES # BLD AUTO: 1.45 THOUSANDS/ΜL (ref 0.6–4.47)
LYMPHOCYTES NFR BLD AUTO: 20 % (ref 14–44)
MCH RBC QN AUTO: 32.2 PG (ref 26.8–34.3)
MCHC RBC AUTO-ENTMCNC: 30.9 G/DL (ref 31.4–37.4)
MCV RBC AUTO: 104 FL (ref 82–98)
MONOCYTES # BLD AUTO: 0.39 THOUSAND/ΜL (ref 0.17–1.22)
MONOCYTES NFR BLD AUTO: 5 % (ref 4–12)
NEUTROPHILS # BLD AUTO: 5.39 THOUSANDS/ΜL (ref 1.85–7.62)
NEUTS SEG NFR BLD AUTO: 72 % (ref 43–75)
NRBC BLD AUTO-RTO: 0 /100 WBCS
PLATELET # BLD AUTO: 190 THOUSANDS/UL (ref 149–390)
PMV BLD AUTO: 9.6 FL (ref 8.9–12.7)
POTASSIUM SERPL-SCNC: 3.4 MMOL/L (ref 3.5–5.3)
POTASSIUM SERPL-SCNC: 6 MMOL/L (ref 3.5–5.3)
PROTHROMBIN TIME: 13.1 SECONDS (ref 11.6–14.5)
RBC # BLD AUTO: 3.32 MILLION/UL (ref 3.81–5.12)
SODIUM SERPL-SCNC: 138 MMOL/L (ref 136–145)
SODIUM SERPL-SCNC: 143 MMOL/L (ref 136–145)
WBC # BLD AUTO: 7.41 THOUSAND/UL (ref 4.31–10.16)

## 2019-10-08 PROCEDURE — 99223 1ST HOSP IP/OBS HIGH 75: CPT | Performed by: SURGERY

## 2019-10-08 PROCEDURE — 85025 COMPLETE CBC W/AUTO DIFF WBC: CPT | Performed by: EMERGENCY MEDICINE

## 2019-10-08 PROCEDURE — 97140 MANUAL THERAPY 1/> REGIONS: CPT

## 2019-10-08 PROCEDURE — 99285 EMERGENCY DEPT VISIT HI MDM: CPT

## 2019-10-08 PROCEDURE — 99284 EMERGENCY DEPT VISIT MOD MDM: CPT | Performed by: EMERGENCY MEDICINE

## 2019-10-08 PROCEDURE — 36415 COLL VENOUS BLD VENIPUNCTURE: CPT | Performed by: EMERGENCY MEDICINE

## 2019-10-08 PROCEDURE — 80048 BASIC METABOLIC PNL TOTAL CA: CPT | Performed by: EMERGENCY MEDICINE

## 2019-10-08 PROCEDURE — 85730 THROMBOPLASTIN TIME PARTIAL: CPT | Performed by: EMERGENCY MEDICINE

## 2019-10-08 PROCEDURE — 70450 CT HEAD/BRAIN W/O DYE: CPT

## 2019-10-08 PROCEDURE — 97110 THERAPEUTIC EXERCISES: CPT

## 2019-10-08 PROCEDURE — 85610 PROTHROMBIN TIME: CPT | Performed by: EMERGENCY MEDICINE

## 2019-10-08 PROCEDURE — 72125 CT NECK SPINE W/O DYE: CPT

## 2019-10-08 RX ORDER — OXYCODONE HYDROCHLORIDE 5 MG/1
2.5 TABLET ORAL EVERY 4 HOURS PRN
Status: DISCONTINUED | OUTPATIENT
Start: 2019-10-08 | End: 2019-10-10 | Stop reason: HOSPADM

## 2019-10-08 RX ORDER — SENNOSIDES 8.6 MG
2 TABLET ORAL DAILY
Status: DISCONTINUED | OUTPATIENT
Start: 2019-10-09 | End: 2019-10-10 | Stop reason: HOSPADM

## 2019-10-08 RX ORDER — LEVETIRACETAM 500 MG/1
500 TABLET ORAL 2 TIMES DAILY
Status: DISCONTINUED | OUTPATIENT
Start: 2019-10-08 | End: 2019-10-10 | Stop reason: HOSPADM

## 2019-10-08 RX ORDER — OXYCODONE HYDROCHLORIDE 5 MG/1
5 TABLET ORAL EVERY 4 HOURS PRN
Status: DISCONTINUED | OUTPATIENT
Start: 2019-10-08 | End: 2019-10-10 | Stop reason: HOSPADM

## 2019-10-08 RX ORDER — ONDANSETRON 2 MG/ML
4 INJECTION INTRAMUSCULAR; INTRAVENOUS EVERY 6 HOURS PRN
Status: DISCONTINUED | OUTPATIENT
Start: 2019-10-08 | End: 2019-10-10 | Stop reason: HOSPADM

## 2019-10-08 RX ORDER — POLYETHYLENE GLYCOL 3350 17 G/17G
17 POWDER, FOR SOLUTION ORAL DAILY
Status: DISCONTINUED | OUTPATIENT
Start: 2019-10-09 | End: 2019-10-10 | Stop reason: HOSPADM

## 2019-10-08 RX ORDER — DOCUSATE SODIUM 100 MG/1
100 CAPSULE, LIQUID FILLED ORAL 2 TIMES DAILY
Status: DISCONTINUED | OUTPATIENT
Start: 2019-10-08 | End: 2019-10-10 | Stop reason: HOSPADM

## 2019-10-08 RX ORDER — HYDROMORPHONE HCL/PF 1 MG/ML
0.2 SYRINGE (ML) INJECTION
Status: DISCONTINUED | OUTPATIENT
Start: 2019-10-08 | End: 2019-10-10 | Stop reason: HOSPADM

## 2019-10-08 RX ORDER — LEVOTHYROXINE SODIUM 112 UG/1
112 TABLET ORAL DAILY
COMMUNITY
Start: 2019-09-30

## 2019-10-08 RX ORDER — GABAPENTIN 300 MG/1
600 CAPSULE ORAL ONCE
Status: COMPLETED | OUTPATIENT
Start: 2019-10-08 | End: 2019-10-08

## 2019-10-08 RX ADMIN — DOCUSATE SODIUM 100 MG: 100 CAPSULE, LIQUID FILLED ORAL at 20:35

## 2019-10-08 RX ADMIN — GABAPENTIN 600 MG: 300 CAPSULE ORAL at 17:20

## 2019-10-08 RX ADMIN — DESMOPRESSIN ACETATE 25.2 MCG: 4 SOLUTION INTRAVENOUS at 20:00

## 2019-10-08 RX ADMIN — LEVETIRACETAM 500 MG: 500 TABLET, FILM COATED ORAL at 20:35

## 2019-10-08 RX ADMIN — OXYCODONE HYDROCHLORIDE 5 MG: 5 TABLET ORAL at 20:35

## 2019-10-08 NOTE — ED PROVIDER NOTES
History  Chief Complaint   Patient presents with   Jeral Hose Fall     Was walking in  the cancer center the automatic doors closed on her walker knocking her backwards hitting the back of her head on the concrete floor  No LOC     79 yo female with h/o CVA, AV transplant, CHF, DM, HTN, HLD presents s/p mechanical fall just PTA  Pt takes plavix  Pt  "medical emergency" from [facility]  Reportedly lobby doors closed on pt causing her to fall backwards onto concrete  No LOC  No seizure like activity  Pt apparently perseverating directly after, which is not baseline  Reports discomfort from lying on stretcher, denies other pain complaint, numbness/tinglng/weakness         History provided by:  Medical records and patient   used: No    Fall   Mechanism of injury: fall    Injury location:  Head/neck  Head/neck injury location:  Head  Time since incident:  15 minutes  Arrived directly from scene: yes    Fall:     Fall occurred:  Standing    Impact surface:  [place]    Point of impact:  Head    Entrapped after fall: no    Protective equipment: none    Suspicion of alcohol use: no    Suspicion of drug use: no    Tetanus status:  Unknown  Prior to arrival data:     Bystander interventions:  Bystander C-spine precautions    Patient ambulatory at scene: no      Blood loss:  None    Responsiveness at scene:  Alert    Orientation at scene:  Person, place, situation and time    Loss of consciousness: no      Amnesic to event: no      Airway interventions:  None    Breathing interventions:  None    IV access status:  None    IO access:  None    Fluids administered:  None    Cardiac interventions:  None    Medications administered:  None    Immobilization:  C-collar and long board    Airway condition since incident:  Stable    Breathing condition since incident:  Stable    Circulation condition since incident:  Stable    Mental status condition since incident:  Improving    Disability condition since incident:  Stable  Associated symptoms: no abdominal pain, no chest pain, no difficulty breathing and no vomiting    Risk factors: anticoagulation therapy, CHF and diabetes        Prior to Admission Medications   Prescriptions Last Dose Informant Patient Reported? Taking?    Levothyroxine Sodium 112 MCG CAPS  Self Yes No   Sig: Take by mouth daily   Multiple Vitamins-Calcium (MULTI-DAY/CALCIUM/EXTRA IRON PO)  Self Yes No   Sig: Take 1 tablet by mouth daily   Multiple Vitamins-Minerals (CENTRUM ADULTS PO)  Self Yes No   Sig: Centrum   Omega-3 Fatty Acids (FISH OIL) 1,000 mg  Self Yes No   Sig: Fish Oil 1,000 mg capsule   acetaminophen (TYLENOL) 325 mg tablet  Self No No   Sig: Take 2 tablets (650 mg total) by mouth every 6 (six) hours as needed for mild pain   Patient taking differently: Take 1,000 mg by mouth 3 (three) times a day as needed for mild pain    albuterol (PROVENTIL HFA,VENTOLIN HFA) 90 mcg/act inhaler  Self Yes No   Sig: Inhale 2 puffs every 4 (four) hours as needed for wheezing   amLODIPine (NORVASC) 5 mg tablet  Self Yes No   Sig: Take 5 mg by mouth every morning    calcium carbonate-vitamin D (OSCAL-D) 500 mg-200 units per tablet  Self Yes No   Sig: Take 1 tablet by mouth 2 (two) times a day with meals     clopidogrel (PLAVIX) 75 mg tablet  Self Yes No   Sig: Take 75 mg by mouth daily   gabapentin (NEURONTIN) 300 mg capsule  Self No No   Sig: Take 2 capsules PO three times a day   hydroxychloroquine (PLAQUENIL) 200 mg tablet  Self Yes No   Sig: Take 200 mg by mouth daily    lidocaine (LIDODERM) 5 %  Self Yes No   Sig: Apply 2 patches topically daily Remove & Discard patch within 12 hours or as directed by MD     methocarbamol (ROBAXIN) 500 mg tablet  Self No No   Sig: Take 1 tablet (500 mg total) by mouth 3 (three) times a day as needed for muscle spasms   metoprolol tartrate (LOPRESSOR) 25 mg tablet  Self Yes No   Sig: Take 25 mg by mouth 2 (two) times a day    nitroglycerin (NITROSTAT) 0 4 mg SL tablet  Self Yes No   Sig: Place under the tongue every 5 (five) minutes as needed for chest pain    nystatin (MYCOSTATIN) powder  Self Yes No   Sig: Apply topically as needed    potassium chloride (K-DUR,KLOR-CON) 10 mEq tablet  Self No No   Sig: Take 1 tablet (10 mEq total) by mouth daily   sertraline (ZOLOFT) 25 mg tablet  Self Yes No   Si mg daily at bedtime    torsemide (DEMADEX) 20 mg tablet  Self No No   Sig: Take 2 tablets (40 mg total) by mouth daily      Facility-Administered Medications: None       Past Medical History:   Diagnosis Date    Arthritis     Breast cancer ([facility] )     Cardiac disease     aortic valve transplant    [facility]est[facility] failure[facility]     Compression fracture of body of thoracic vertebra ([facility])     CVA (cerebral vascular accident) ([facility] )     Diabetes mellitus ([facility] )     Disease of thyroid gland     Fibromyalgia, primary     Hyperlipidemia     Hypertension     Neuropathy     Pressure injury of skin     Renal disorder     kidney stent    Stroke [facility])        Past Surgical History:   Procedure Laterality Date    AORTIC VALVE SURGERY      BREAST LUMPECTOMY Right     BREAST LUMPECTOMY Left     BREAST SURGERY      lumpectomy for breast cancer    CATARACT EXTRACTION      CHOLECYSTECTOMY      HYSTERECTOMY      KIDNEY SURGERY      stent placed for kidney    OTHER SURGICAL HISTORY      abdominal aneurysm surgery    LA ARTHRODESIS POSTERIOR ATLAS-AXIS C1-C2 N/A 2019    Procedure: C1-C2 lateral mass fixation fusion with possible sublaminar cables;  Surgeon: Maria E Laguna MD;  [facility]: [facility];  Service: Neurosurgery    REPLACEMENT TOTAL KNEE      left        Family History   Problem Relation Age of Onset    Heart disease Mother     Arthritis Mother     Diabetes Mother     Heart failure Father     Arthritis Father     Other Father         enlargement of prostate     Dementia Father     No Known Problems Daughter     No Known Problems Maternal Grandmother     No Known Problems Maternal Grandfather     No Known Problems Paternal Grandmother     No Known Problems Paternal Grandfather     No Known Problems Maternal Aunt     No Known Problems Maternal Aunt     No Known Problems Maternal Aunt     No Known Problems Maternal Aunt     No Known Problems Paternal Aunt     No Known Problems Paternal Aunt      I have reviewed and agree with the history as documented  Social History     Tobacco Use    Smoking status: Never Smoker    Smokeless tobacco: Never Used   Substance Use Topics    Alcohol use: Not Currently    Drug use: No        Review of Systems   Cardiovascular: Negative for chest pain  Gastrointestinal: Negative for abdominal pain and vomiting  Neurological: Positive for numbness  Negative for weakness  All other systems reviewed and are negative  Physical Exam  Physical Exam   Constitutional: She is oriented to person, place, and time  She appears well-developed and well-nourished  HENT:   Head: Normocephalic  Head is with contusion (back of head, no skull defect or boggy hematoma)  Head is without raccoon's eyes, without Thomson's sign, without abrasion and without laceration  Eyes: Conjunctivae are normal    Neck: Trachea normal and normal range of motion  C-spine intact   Cardiovascular: Normal rate, regular rhythm, normal heart sounds and intact distal pulses  No murmur heard  Pulmonary/Chest: Effort normal and breath sounds normal  No respiratory distress  Abdominal: Soft  Musculoskeletal: Normal range of motion  She exhibits no deformity  Neurological: She is alert and oriented to person, place, and time  She has normal strength  She is not disoriented  No cranial nerve deficit or sensory deficit  She displays a negative Romberg sign  Coordination and gait normal  GCS eye subscore is 4  GCS verbal subscore is 5  GCS motor subscore is 6  She displays no Babinski's sign on the right side   She displays no Babinski's sign on the left side  No nystagmus   Skin: Skin is warm and dry  She is not diaphoretic  Psychiatric: She has a normal mood and affect  Her behavior is normal  Judgment and thought content normal    Nursing note and vitals reviewed  Vital Signs  ED Triage Vitals   Temp Pulse Resp BP SpO2   -- -- -- -- --      Temp src Heart Rate Source Patient Position - Orthostatic VS BP Location FiO2 (%)   -- -- -- -- --      Pain Score       --           There were no vitals filed for this visit  Visual Acuity      ED Medications  Medications - No data to display    Diagnostic Studies  Results Reviewed     None                 CT cervical spine without contrast    (Results Pending)   CT head without contrast    (Results Pending)              Procedures  Procedures       ED Course  ED Course as of Oct 08 1849   Tue Oct 08, 2019   1552 Pt request home dose of neurontin      1646 Pt with small SDH, neuro intact  Discussed with Trauma  Accepted to their service at [facility]  Transport comiging 5:15PM                                  MDM  Number of Diagnoses or Management Options  Fall, initial encounter:   Subdural hematoma, acute [facility]):   Diagnosis management comments: 81 yo female on plavix with small SDH after mechanical fall  No other associated injuries  Neuro intact  HD stable    Tx'd to [place] [address] service for further work up and eval         Amount and/or Complexity of Data Reviewed  Clinical lab tests: ordered and reviewed  Tests in the radiology section of CPT®: ordered and reviewed  Tests in the medicine section of CPT®: ordered and reviewed  Obtain history from someone other than the patient: yes  Review and summarize past medical records: yes  Discuss the patient with other providers: yes  Independent visualization of images, tracings, or specimens: yes    Risk of Complications, Morbidity, and/or Mortality  Presenting problems: high  Management options: high    Patient Progress  Patient progress: stable      Disposition  Final diagnoses:   None     ED Disposition     None      Follow-up Information    None         Patient's Medications   Discharge Prescriptions    No medications on file     No discharge procedures on file      ED Provider  Electronically Signed by           Fabiola Gamez MD  10/08/19 4487

## 2019-10-08 NOTE — EMTALA/ACUTE CARE TRANSFER
[address] [place] [address]  Dept: [phone]      EMTALA TRANSFER CONSENT    NAME Audelia Mackay                                         1934                              MRN [id number]    I have been informed of my rights regarding examination, treatment, and transfer   by Dr Kevin Ramos MD    Benefits: Specialized equipment and/or services available at the receiving facility (Include comment)________________________    Risks: Potential for delay in receiving treatment, Potential deterioration of medical condition, Possible worsening of condition or death during transfer      Transfer Request   I acknowledge that my medical condition has been evaluated and explained to me by the emergency department physician or other qualified medical person and/or my attending physician who has recommended and offered to me further medical examination and treatment  I understand the Hospital's obligation with respect to the treatment and stabilization of my emergency medical condition  I nevertheless request to be transferred  I release the Hospital, the doctor, and any other persons caring for me from all responsibility or liability for any injury or ill effects that may result from my transfer and agree to accept all responsibility for the consequences of my choice to transfer, rather than receive stabilizing treatment at the Hospital  I understand that because the transfer is my request, my insurance may not provide reimbursement for the services  The Hospital will assist and direct me and my family in how to make arrangements for transfer, but the hospital is not liable for any fees charged by the transport service  In spite of this understanding, I refuse to consent to further medical examination and treatment which has been offered to me, and request transfer to  David Goyal Name, [address] : [address]   I authorize the performance of emergency medical procedures and treatments upon me in both transit and upon arrival at the receiving facility  Additionally, I authorize the release of any and all medical records to the receiving facility and request they be transported with me, if possible  I authorize the performance of emergency medical procedures and treatments upon me in both transit and upon arrival at the receiving facility  Additionally, I authorize the release of any and all medical records to the receiving facility and request they be transported with me, if possible  I understand that the safest mode of transportation during a medical emergency is an ambulance and that the Hospital advocates the use of this mode of transport  Risks of traveling to the receiving facility by car, including absence of medical control, life sustaining equipment, such as oxygen, and medical personnel has been explained to me and I fully understand them  (REGINE CORRECT BOX BELOW)  [  ]  I consent to the stated transfer and to be transported by ambulance/helicopter  [  ]  I consent to the stated transfer, but refuse transportation by ambulance and accept full responsibility for my transportation by car  I understand the risks of non-ambulance transfers and I exonerate the Hospital and its staff from any deterioration in my condition that results from this refusal     X___________________________________________    DATE  10/08/19  TIME________  Signature of patient or legally responsible individual signing on patient behalf           RELATIONSHIP TO PATIENT_________________________          Provider Certification    NAME Jacqueline Cornejo                                         1934                              MRN [id number]    A medical screening exam was performed on the above named patient  Based on the examination:    Condition Necessitating Transfer The primary encounter diagnosis was Subdural hematoma, acute ([address] )   A diagnosis of Fall, initial encounter was also pertinent to this visit  Patient Condition: The patient has been stabilized such that within reasonable medical probability, no material deterioration of the patient condition or the condition of the unborn child(brittany) is likely to result from the transfer    Reason for Transfer: Level of Care needed not available at this facility    [facility]   · Space available and qualified personnel available for treatment as acknowledged by    · Agreed to accept transfer and to provide appropriate medical treatment as acknowledged by       Dr Kate Molina  · Appropriate medical records of the examination and treatment of the patient are provided at the time of transfer   [address] [facility],[address] _______  · Transfer will be performed by qualified personnel from    and appropriate transfer equipment as required, including the use of necessary and appropriate life support measures  Provider Certification: I have examined the patient and explained the following risks and benefits of being transferred/refusing transfer to the patient/family:         Based on these reasonable risks and benefits to the patient and/or the unborn child(brittany), and based upon the information available at the time of the patients examination, I certify that the medical benefits reasonably to be expected from the provision of appropriate medical treatments at another medical facility outweigh the increasing risks, if any, to the individuals medical condition, and in the case of labor to the unborn child, from effecting the transfer      X____________________________________________ DATE 10/08/19        TIME_______      ORIGINAL - SEND TO MEDICAL RECORDS   COPY - SEND WITH PATIENT DURING TRANSFER

## 2019-10-08 NOTE — PROGRESS NOTES
Daily Note     Today's date: 10/8/2019  Patient name: Pako Wood  : 1934  MRN: [id number]  Referring provider: Laura Gold MD  Dx:   Encounter Diagnosis     ICD-10-CM    1  S/P cervical spinal fusion Z98 1                   Subjective: Patient presents feeling well overall  0/10 pain noted  Offers no additional complaints  Objective: See treatment diary below      Assessment: Patient demonstrates improved cervical rotation exhibited with PROM  Unsteadiness exhibited with ambulation, but patient was able to safely transfer from sit-to-stand and stand-to-sit without LOB  Patient demonstrated fatigue post treatment, exhibited good technique with therapeutic exercises and would benefit from continued PT      Plan: Continue per plan of care        Precautions: *FALL RISK*, S/P Cervical Spine Fusion, HTN, HF, CKD, PmHx Breast CA, Fibromyalgia     Manual  10/1  IE 10/3 10/8          Cervical Rotation Stretch  5' 10'          Cervical Extension Stretch             UT/LS Stretch   5'                                        Exercise Diary  10/1  IE 10/3 10/8          Pulleys             Cervical AROM (flex/ext, Rot)  10x10" 10x10"          UT/LS Stretch   5x30" 5x30"          Cervical Isometrics (flex, SB, Rot)  15x5"           Chin Tucks  20x5" 20x5"          TB Rows  Green  2x15 Green  2x15          DB Shoulder Flex/Scap  2#  2x12   2#  2x12          DB Biceps Curl  4#  2x12 4#  2x12                                                                                                                                                                          Modalities

## 2019-10-09 ENCOUNTER — APPOINTMENT (INPATIENT)
Dept: RADIOLOGY | Facility: HOSPITAL | Age: 84
DRG: 086 | End: 2019-10-09
Payer: COMMERCIAL

## 2019-10-09 LAB
BACTERIA UR QL AUTO: ABNORMAL /HPF
BILIRUB UR QL STRIP: NEGATIVE
CLARITY UR: CLEAR
COLOR UR: YELLOW
ERYTHROCYTE [DISTWIDTH] IN BLOOD BY AUTOMATED COUNT: 14.1 % (ref 11.6–15.1)
GLUCOSE UR STRIP-MCNC: NEGATIVE MG/DL
HCT VFR BLD AUTO: 30 % (ref 34.8–46.1)
HGB BLD-MCNC: 9.3 G/DL (ref 11.5–15.4)
HGB UR QL STRIP.AUTO: NEGATIVE
HYALINE CASTS #/AREA URNS LPF: ABNORMAL /LPF
KETONES UR STRIP-MCNC: NEGATIVE MG/DL
LEUKOCYTE ESTERASE UR QL STRIP: ABNORMAL
MCH RBC QN AUTO: 32.2 PG (ref 26.8–34.3)
MCHC RBC AUTO-ENTMCNC: 31 G/DL (ref 31.4–37.4)
MCV RBC AUTO: 104 FL (ref 82–98)
NITRITE UR QL STRIP: NEGATIVE
NON-SQ EPI CELLS URNS QL MICRO: ABNORMAL /HPF
PH UR STRIP.AUTO: 5.5 [PH]
PLATELET # BLD AUTO: 157 THOUSANDS/UL (ref 149–390)
PMV BLD AUTO: 9.7 FL (ref 8.9–12.7)
PROT UR STRIP-MCNC: NEGATIVE MG/DL
RBC # BLD AUTO: 2.89 MILLION/UL (ref 3.81–5.12)
RBC #/AREA URNS AUTO: ABNORMAL /HPF
SP GR UR STRIP.AUTO: 1.01 (ref 1–1.03)
UROBILINOGEN UR QL STRIP.AUTO: 0.2 E.U./DL
WBC # BLD AUTO: 6.53 THOUSAND/UL (ref 4.31–10.16)
WBC #/AREA URNS AUTO: ABNORMAL /HPF

## 2019-10-09 PROCEDURE — 99222 1ST HOSP IP/OBS MODERATE 55: CPT | Performed by: NEUROLOGICAL SURGERY

## 2019-10-09 PROCEDURE — G8988 SELF CARE GOAL STATUS: HCPCS

## 2019-10-09 PROCEDURE — G8978 MOBILITY CURRENT STATUS: HCPCS

## 2019-10-09 PROCEDURE — 99222 1ST HOSP IP/OBS MODERATE 55: CPT | Performed by: INTERNAL MEDICINE

## 2019-10-09 PROCEDURE — 97163 PT EVAL HIGH COMPLEX 45 MIN: CPT

## 2019-10-09 PROCEDURE — 81001 URINALYSIS AUTO W/SCOPE: CPT | Performed by: PHYSICIAN ASSISTANT

## 2019-10-09 PROCEDURE — 99232 SBSQ HOSP IP/OBS MODERATE 35: CPT | Performed by: PHYSICIAN ASSISTANT

## 2019-10-09 PROCEDURE — 99253 IP/OBS CNSLTJ NEW/EST LOW 45: CPT | Performed by: PHYSICIAN ASSISTANT

## 2019-10-09 PROCEDURE — 70450 CT HEAD/BRAIN W/O DYE: CPT

## 2019-10-09 PROCEDURE — G8979 MOBILITY GOAL STATUS: HCPCS

## 2019-10-09 PROCEDURE — G8987 SELF CARE CURRENT STATUS: HCPCS

## 2019-10-09 PROCEDURE — 97167 OT EVAL HIGH COMPLEX 60 MIN: CPT

## 2019-10-09 PROCEDURE — 85027 COMPLETE CBC AUTOMATED: CPT | Performed by: EMERGENCY MEDICINE

## 2019-10-09 RX ORDER — LIDOCAINE 50 MG/G
1 PATCH TOPICAL DAILY
Status: DISCONTINUED | OUTPATIENT
Start: 2019-10-09 | End: 2019-10-10 | Stop reason: HOSPADM

## 2019-10-09 RX ORDER — MULTIVITAMIN/IRON/FOLIC ACID 18MG-0.4MG
1 TABLET ORAL DAILY
Status: DISCONTINUED | OUTPATIENT
Start: 2019-10-09 | End: 2019-10-10 | Stop reason: HOSPADM

## 2019-10-09 RX ORDER — ACETAMINOPHEN 325 MG/1
975 TABLET ORAL EVERY 8 HOURS SCHEDULED
Status: DISCONTINUED | OUTPATIENT
Start: 2019-10-09 | End: 2019-10-10 | Stop reason: HOSPADM

## 2019-10-09 RX ORDER — AMLODIPINE BESYLATE 5 MG/1
5 TABLET ORAL EVERY MORNING
Status: DISCONTINUED | OUTPATIENT
Start: 2019-10-09 | End: 2019-10-09

## 2019-10-09 RX ORDER — B-COMPLEX WITH VITAMIN C
1 TABLET ORAL 2 TIMES DAILY WITH MEALS
Status: DISCONTINUED | OUTPATIENT
Start: 2019-10-09 | End: 2019-10-10 | Stop reason: HOSPADM

## 2019-10-09 RX ORDER — TORSEMIDE 20 MG/1
40 TABLET ORAL DAILY
Status: DISCONTINUED | OUTPATIENT
Start: 2019-10-09 | End: 2019-10-09

## 2019-10-09 RX ORDER — LEVOTHYROXINE SODIUM 112 UG/1
112 TABLET ORAL
Status: DISCONTINUED | OUTPATIENT
Start: 2019-10-10 | End: 2019-10-10 | Stop reason: HOSPADM

## 2019-10-09 RX ORDER — AMLODIPINE BESYLATE 5 MG/1
5 TABLET ORAL EVERY MORNING
Status: DISCONTINUED | OUTPATIENT
Start: 2019-10-09 | End: 2019-10-10 | Stop reason: HOSPADM

## 2019-10-09 RX ORDER — GABAPENTIN 300 MG/1
300 CAPSULE ORAL
Status: DISCONTINUED | OUTPATIENT
Start: 2019-10-09 | End: 2019-10-10 | Stop reason: HOSPADM

## 2019-10-09 RX ORDER — ACETAMINOPHEN 325 MG/1
975 TABLET ORAL EVERY 6 HOURS PRN
Status: DISCONTINUED | OUTPATIENT
Start: 2019-10-09 | End: 2019-10-09

## 2019-10-09 RX ORDER — METHOCARBAMOL 500 MG/1
500 TABLET, FILM COATED ORAL 3 TIMES DAILY PRN
Status: DISCONTINUED | OUTPATIENT
Start: 2019-10-09 | End: 2019-10-10 | Stop reason: HOSPADM

## 2019-10-09 RX ORDER — SERTRALINE HYDROCHLORIDE 25 MG/1
25 TABLET, FILM COATED ORAL
Status: DISCONTINUED | OUTPATIENT
Start: 2019-10-09 | End: 2019-10-10 | Stop reason: HOSPADM

## 2019-10-09 RX ORDER — HYDROXYCHLOROQUINE SULFATE 200 MG/1
200 TABLET, FILM COATED ORAL DAILY
Status: DISCONTINUED | OUTPATIENT
Start: 2019-10-09 | End: 2019-10-09

## 2019-10-09 RX ADMIN — LIDOCAINE 1 PATCH: 50 PATCH CUTANEOUS at 14:00

## 2019-10-09 RX ADMIN — OXYCODONE HYDROCHLORIDE 5 MG: 5 TABLET ORAL at 21:39

## 2019-10-09 RX ADMIN — ACETAMINOPHEN 975 MG: 325 TABLET ORAL at 21:39

## 2019-10-09 RX ADMIN — GABAPENTIN 300 MG: 300 CAPSULE ORAL at 21:39

## 2019-10-09 RX ADMIN — LEVETIRACETAM 500 MG: 500 TABLET, FILM COATED ORAL at 18:06

## 2019-10-09 RX ADMIN — METHOCARBAMOL TABLETS 500 MG: 500 TABLET, COATED ORAL at 14:00

## 2019-10-09 RX ADMIN — OXYCODONE HYDROCHLORIDE 5 MG: 5 TABLET ORAL at 12:17

## 2019-10-09 RX ADMIN — LEVETIRACETAM 500 MG: 500 TABLET, FILM COATED ORAL at 08:34

## 2019-10-09 RX ADMIN — DOCUSATE SODIUM 100 MG: 100 CAPSULE, LIQUID FILLED ORAL at 18:07

## 2019-10-09 RX ADMIN — Medication 1 TABLET: at 14:00

## 2019-10-09 RX ADMIN — ACETAMINOPHEN 975 MG: 325 TABLET ORAL at 14:06

## 2019-10-09 RX ADMIN — Medication 1 TABLET: at 18:07

## 2019-10-09 RX ADMIN — DOCUSATE SODIUM 100 MG: 100 CAPSULE, LIQUID FILLED ORAL at 08:34

## 2019-10-09 RX ADMIN — SERTRALINE HYDROCHLORIDE 25 MG: 25 TABLET ORAL at 21:39

## 2019-10-09 NOTE — PLAN OF CARE
Problem: Nutrition/Hydration-ADULT  Goal: Nutrient/Hydration intake appropriate for improving, restoring or maintaining nutritional needs  Description  Monitor and assess patient's nutrition/hydration status for malnutrition  Collaborate with interdisciplinary team and initiate plan and interventions as ordered  Monitor patient's weight and dietary intake as ordered or per policy  Utilize nutrition screening tool and intervene as necessary  Determine patient's food preferences and provide high-protein, high-caloric foods as appropriate       INTERVENTIONS:  - Monitor oral intake, urinary output, labs, and treatment plans  - Assess nutrition and hydration status and recommend course of action  - Evaluate amount of meals eaten  - Assist patient with eating if necessary   - Allow adequate time for meals  - Recommend/ encourage appropriate diets, oral nutritional supplements, and vitamin/mineral supplements  - Order, calculate, and assess calorie counts as needed  - Recommend, monitor, and adjust tube feedings and TPN/PPN based on assessed needs  - Assess need for intravenous fluids  - Provide specific nutrition/hydration education as appropriate  - Include patient/family/caregiver in decisions related to nutrition  Outcome: Progressing     Problem: Prexisting or High Potential for Compromised Skin Integrity  Goal: Skin integrity is maintained or improved  Description  INTERVENTIONS:  - Identify patients at risk for skin breakdown  - Assess and monitor skin integrity  - Assess and monitor nutrition and hydration status  - Monitor labs   - Assess for incontinence   - Turn and reposition patient  - Assist with mobility/ambulation  - Relieve pressure over bony prominences  - Avoid friction and shearing  - Provide appropriate hygiene as needed including keeping skin clean and dry  - Evaluate need for skin moisturizer/barrier cream  - Collaborate with interdisciplinary team   - Patient/family teaching  - Consider wound care consult   Outcome: Progressing     Problem: Potential for Falls  Goal: Patient will remain free of falls  Description  INTERVENTIONS:  - Assess patient frequently for physical needs  -  Identify cognitive and physical deficits and behaviors that affect risk of falls    -  [place] fall precautions as indicated by assessment   - Educate patient/family on patient safety including physical limitations  - Instruct patient to call for assistance with activity based on assessment  - Modify environment to reduce risk of injury  - Consider OT/PT consult to assist with strengthening/mobility  Outcome: Progressing     Problem: NEUROSENSORY - ADULT  Goal: Achieves stable or improved neurological status  Description  INTERVENTIONS  - Monitor and report changes in neurological status  - Monitor vital signs such as temperature, blood pressure, glucose, and any other labs ordered   - Initiate measures to prevent increased intracranial pressure  - Monitor for seizure activity and implement precautions if appropriate      Outcome: Progressing  Goal: Remains free of injury related to seizures activity  Description  INTERVENTIONS  - Maintain airway, patient safety  and administer oxygen as ordered  - Monitor patient for seizure activity, document and report duration and description of seizure to physician/advanced practitioner  - If seizure occurs,  ensure patient safety during seizure  - Reorient patient post seizure  - Seizure pads on all 4 side rails  - Instruct patient/family to notify RN of any seizure activity including if an aura is experienced  - Instruct patient/family to call for assistance with activity based on nursing assessment  - Administer anti-seizure medications if ordered    Outcome: Progressing  Goal: Achieves maximal functionality and self care  Description  INTERVENTIONS  - Monitor swallowing and airway patency with patient fatigue and changes in neurological status  - Encourage and assist patient to increase activity and self care     - Encourage visually impaired, hearing impaired and aphasic patients to use assistive/communication devices  Outcome: Progressing     Problem: SKIN/TISSUE INTEGRITY - ADULT  Goal: Skin integrity remains intact  Description  INTERVENTIONS  - Identify patients at risk for skin breakdown  - Assess and monitor skin integrity  - Assess and monitor nutrition and hydration status  - Monitor labs (i e  albumin)  - Assess for incontinence   - Turn and reposition patient  - Assist with mobility/ambulation  - Relieve pressure over bony prominences  - Avoid friction and shearing  - Provide appropriate hygiene as needed including keeping skin clean and dry  - Evaluate need for skin moisturizer/barrier cream  - Collaborate with interdisciplinary team (i e  Nutrition, Rehabilitation, etc )   - Patient/family teaching  Outcome: Progressing  Goal: Incision(s), wounds(s) or drain site(s) healing without S/S of infection  Description  INTERVENTIONS  - Assess and document risk factors for skin impairment   - Assess and document dressing, incision, wound bed, drain sites and surrounding tissue  - Consider nutrition services referral as needed  - Oral mucous membranes remain intact  - Provide patient/ family education  Outcome: Progressing     Problem: MUSCULOSKELETAL - ADULT  Goal: Maintain or return mobility to safest level of function  Description  INTERVENTIONS:  - Assess patient's ability to carry out ADLs; assess patient's baseline for ADL function and identify physical deficits which impact ability to perform ADLs (bathing, care of mouth/teeth, toileting, grooming, dressing, etc )  - Assess/evaluate cause of self-care deficits   - Assess range of motion  - Assess patient's mobility  - Assess patient's need for assistive devices and provide as appropriate  - Encourage maximum independence but intervene and supervise when necessary  - Involve family in performance of ADLs  - Assess for home care needs following discharge   - Consider OT consult to assist with ADL evaluation and planning for discharge  - Provide patient education as appropriate  Outcome: Progressing  Goal: Maintain proper alignment of affected body part  Description  INTERVENTIONS:  - Support, maintain and protect limb and body alignment  - Provide patient/ family with appropriate education  Outcome: Progressing     Problem: PAIN - ADULT  Goal: Verbalizes/displays adequate comfort level or baseline comfort level  Description  Interventions:  - Encourage patient to monitor pain and request assistance  - Assess pain using appropriate pain scale  - Administer analgesics based on type and severity of pain and evaluate response  - Implement non-pharmacological measures as appropriate and evaluate response  - Consider cultural and social influences on pain and pain management  - Notify physician/advanced practitioner if interventions unsuccessful or patient reports new pain  Outcome: Progressing     Problem: INFECTION - ADULT  Goal: Absence or prevention of progression during hospitalization  Description  INTERVENTIONS:  - Assess and monitor for signs and symptoms of infection  - Monitor lab/diagnostic results  - Monitor all insertion sites, i e  indwelling lines, tubes, and drains  - Monitor endotracheal if appropriate and nasal secretions for changes in amount and color  - [place] appropriate cooling/warming therapies per order  - Administer medications as ordered  - Instruct and encourage patient and family to use good hand hygiene technique  - Identify and instruct in appropriate isolation precautions for identified infection/condition  Outcome: Progressing     Problem: SAFETY ADULT  Goal: Maintain or return to baseline ADL function  Description  INTERVENTIONS:  -  Assess patient's ability to carry out ADLs; assess patient's baseline for ADL function and identify physical deficits which impact ability to perform ADLs (bathing, care of mouth/teeth, toileting, grooming, dressing, etc )  - Assess/evaluate cause of self-care deficits   - Assess range of motion  - Assess patient's mobility; develop plan if impaired  - Assess patient's need for assistive devices and provide as appropriate  - Encourage maximum independence but intervene and supervise when necessary  - Involve family in performance of ADLs  - Assess for home care needs following discharge   - Consider OT consult to assist with ADL evaluation and planning for discharge  - Provide patient education as appropriate  Outcome: Progressing  Goal: Maintain or return mobility status to optimal level  Description  INTERVENTIONS:  - Assess patient's baseline mobility status (ambulation, transfers, stairs, etc )    - Identify cognitive and physical deficits and behaviors that affect mobility  - Identify mobility aids required to assist with transfers and/or ambulation (gait belt, sit-to-stand, lift, walker, cane, etc )  - [place] fall precautions as indicated by assessment  - Record patient progress and toleration of activity level on Mobility SBAR; progress patient to next Phase/Stage  - Instruct patient to call for assistance with activity based on assessment  - Consider rehabilitation consult to assist with strengthening/weightbearing, etc   Outcome: Progressing     Problem: DISCHARGE PLANNING  Goal: Discharge to home or other facility with appropriate resources  Description  INTERVENTIONS:  - Identify barriers to discharge w/patient and caregiver  - Arrange for needed discharge resources and transportation as appropriate  - Identify discharge learning needs (meds, wound care, etc )  - Arrange for interpretive services to assist at discharge as needed  - Refer to Case Management Department for coordinating discharge planning if the patient needs post-hospital services based on physician/advanced practitioner order or complex needs related to functional status, cognitive ability, or social support system  Outcome: Progressing     Problem: Knowledge Deficit  Goal: Patient/family/caregiver demonstrates understanding of disease process, treatment plan, medications, and discharge instructions  Description  Complete learning assessment and assess knowledge base    Interventions:  - Provide teaching at level of understanding  - Provide teaching via preferred learning methods  Outcome: Progressing

## 2019-10-09 NOTE — ASSESSMENT & PLAN NOTE
- Acute kidney injury likely secondary to dehydration and diuretic use  - Await evaluation recommendations from Internal Medicine, and consider consultation to Nephrology if there is failure to improve  - Obtain UA with microscopic results, but no reflex to culture  - Monitor urine output and renal function with daily BMP  - Set hold parameters for blood pressure medication for systolic blood pressure of 130 mmHg  - Avoid nephrotoxic medications  Will hold torsemide and Plaquenil for now  May also consider holding gabapentin

## 2019-10-09 NOTE — ASSESSMENT & PLAN NOTE
- Stage 3 chronic kidney disease with baseline creatinine appearing to be between 1 5-1 6   - Management of acute kidney injury as noted  - Will need close outpatient follow up with both primary care provider and Cardiology    May also need outpatient follow up with nephrologist

## 2019-10-09 NOTE — UTILIZATION REVIEW
Initial Clinical Review    10/8 Transfer from [facility] ED  Admission: Date/Time/Statement: Inpatient Admission Orders (From admission, onward)     Ordered        10/08/19 1943  Inpatient Admission  Once                   Orders Placed This Encounter   Procedures    Inpatient Admission     Standing Status:   Standing     Number of Occurrences:   1     Order Specific Question:   Admitting Physician     Answer:   [place]     Order Specific Question:   Level of Care     Answer:   Level 2 Stepdown / HOT [14]     Order Specific Question:   Estimated length of stay     Answer:   More than 2 Midnights     Order Specific Question:   Certification     Answer:   I certify that inpatient services are medically necessary for this patient for a duration of greater than two midnights  See H&P and MD Progress Notes for additional information about the patient's course of treatment  ED Arrival Information     Expected Arrival Acuity Means of Arrival Escorted By Service Admission Type    10/8/2019 17:30 10/8/2019 17:53 Emergent Ambulance SLETS [facility] Trauma Emergency    Arrival Complaint    sub-dural        Chief Complaint   Patient presents with    Fall     pt presents to ED via EMS as a trauma transfer from [facility] [place]  pt was walking through a set of double doors and they closed on her walker  pt fell with impact to head   +blood thinners +left subdural    Trauma     Assessment/Plan:   80y Female, transfer from [address] [place] [address] S/P Fall with Subdural hematoma  She was walking towards automatic door to Oncology appointment when shut before she reached it  PMH of CHF, Type 2 DM, HTN,  HLD, 2 CVA on Plavix  Transfer to [facility] for [facility]  Admit Inpatient level of care for Traumatic subdural hematoma, Ambulatory function, HERLINDA on CKD stage 3 and Chronic diastolic heart failure  Neurosurgery consult, neuro checks and pain control  10/9 Progress notes;  Await repeat CT scan of the head later today  HERLINDA likely 2nd to dehydration and diuretic use - baseline creat 1 5-1 6  Monitor urine outpt and renal function with daily BMP  Hold torsemide and Plaquenil for now, consider holding gabapentin  Will likely need some chronic diuretic therapy once acute kidney injury is resolved  Daily wts and monitor creat closely  10/9 Neurosurgery cons; Currently c/o slight headache and neck pain  On Plavix for hx of multiple strokes, most recent 2016  DDAVT given in ED  Repeat CT Head this afternoon  Keppra, hold Plavix and other blood thinning meds x2 wks and pain control  No neurosurgical intervention at this time  ED Triage Vitals [10/08/19 1801]   Temperature Pulse Respirations Blood Pressure SpO2   97 8 °F (36 6 °C) (!) 54 18 118/55 94 %      Temp Source Heart Rate Source Patient Position - Orthostatic VS BP Location FiO2 (%)   Oral Monitor Lying Right arm --      Pain Score       No Pain        Wt Readings from Last 1 Encounters:   10/08/19 84 1 kg (185 lb 6 5 oz)     Additional Vital Signs:   10/09/19 0351  98 1 °F (36 7 °C)  56  20  119/56  94 %  None (Room air)    10/09/19 0000            None (Room air)    10/08/19 2326  98 5 °F (36 9 °C)  58  18  119/50        10/08/19 2037    56  18  122/56  95 %  None (Room air)    10/08/19 1904    53Abnormal   18  118/55  95 %  None (Room air)      Pertinent Labs/Diagnostic Test Results:   10/8  CT Head - Tiny acute subdural hematoma at the left paramedian anterior falx   No intraparenchymal hemorrhage  Marked chronic small vessel ischemic changes and mild volume loss  10/8 CT Cervical spine - No acute cervical spine fracture or subluxation  Postoperative changes of posterior cervical fusion at C1 and C2 for repair of a displaced odontoid fracture  There is stable 5 mm of anterior displacement of the odontoid process in relation to the body of C2    Moderate to marked 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 with multilevel anterior bridging osteophytes  There is a stable previously described nondisplaced fracture of an anterior bridging osteophyte at C6     10/9 Repeat CT Head - Grossly stable right anterior parafalcine subdural hematoma measuring up to 4 mm in maximum thickness  No new acute intracranial hemorrhage      Results from last 7 days   Lab Units 10/09/19  0436 10/08/19  1708   WBC Thousand/uL 6 53 7 41   HEMOGLOBIN g/dL 9 3* 10 7*   HEMATOCRIT % 30 0* 34 6*   PLATELETS Thousands/uL 157 190   NEUTROS ABS Thousands/µL  --  5 39         Results from last 7 days   Lab Units 10/08/19  2029 10/08/19  1708   SODIUM mmol/L 143 138   POTASSIUM mmol/L 3 4* 6 0*   CHLORIDE mmol/L 104 99*   CO2 mmol/L 31 31   ANION GAP mmol/L 8 8   BUN mg/dL 60* 61*   CREATININE mg/dL 2 44* 2 60*   EGFR ml/min/1 73sq m 18 16   CALCIUM mg/dL 9 8 10 4*     Results from last 7 days   Lab Units 10/08/19  2029 10/08/19  1708   GLUCOSE RANDOM mg/dL 133 145*     Results from last 7 days   Lab Units 10/08/19  1708   PROTIME seconds 13 1   INR  1 05   PTT seconds 23     ED Treatment:   Medication Administration from 10/08/2019 1737 to 10/08/2019 2309       Date/Time Order Dose Route Action     10/08/2019 2000 desmopressin (DDAVP) 25 2 mcg in sodium chloride 0 9 % 50 mL IVPB 25 2 mcg Intravenous New Bag     10/08/2019 2035 levETIRAcetam (KEPPRA) tablet 500 mg 500 mg Oral Given     10/08/2019 2035 docusate sodium (COLACE) capsule 100 mg 100 mg Oral Given     10/08/2019 2035 oxyCODONE (ROXICODONE) IR tablet 5 mg 5 mg Oral Given        Past Medical History:   Diagnosis Date    Arthritis     Breast cancer ([facility] ) 2015    Cardiac disease     aortic valve transplant    CHF (congestive heart failure) ([facility] )     Compression fracture of body of thoracic vertebra ([facility] )     CVA (cerebral vascular accident) ([facility] )     Diabetes mellitus ([facility] )     Disease of thyroid gland     Fibromyalgia, primary     Hyperlipidemia     Hypertension     Neuropathy     Pressure injury of skin     Renal disorder     kidney stent    Stroke [facility])      Present on Admission:   Ambulatory dysfunction   Essential hypertension   Stage 3 chronic kidney disease (HCC)   Traumatic subdural hematoma without loss of consciousness ([facility] )    Admitting Diagnosis: Acute on chronic diastolic heart failure (HCC) [I50 33]  Diabetes ([facility] ) [E11 9]  Subdural hematoma, acute ([facility] ) [S06 5X9A]  Ambulatory dysfunction [R26 2]  Type 2 diabetes mellitus with diabetic polyneuropathy, without long-term current use of insulin ([facility]) [E11 42]  Stage 3 chronic kidney disease ([facility] ) [N18 3]  Unspecified multiple injuries, initial encounter [T07  XXXA]  Hypertension, unspecified type [I10]     Age/Sex: 80 y o  female     Admission Orders:  Neurological checks q1h  IP CONSULT TO NEUROSURGERY  IP CONSULT TO GERONTOLOGY  IP CONSULT TO INTERNAL MEDICINE  IP CONSULT TO WOUND CARE    Current Facility-Administered Medications:  acetaminophen 975 mg Oral Q8H [address]   amLODIPine 5 mg Oral QAM   calcium carbonate-vitamin D 1 tablet Oral BID With Meals   docusate sodium 100 mg Oral BID   gabapentin 300 mg Oral HS   HYDROmorphone 0 2 mg Intravenous Q3H PRN   levETIRAcetam 500 mg Oral BID   [START ON 10/10/2019] levothyroxine 112 mcg Oral Early Morning   lidocaine 1 patch Topical Daily   methocarbamol 500 mg Oral TID PRN 10/9 x1   multivitamin-minerals 1 tablet Oral Daily   ondansetron 4 mg Intravenous Q6H PRN   oxyCODONE 2 5 mg Oral Q4H PRN   oxyCODONE 5 mg Oral Q4H PRN 10/9 x1   polyethylene glycol 17 g Oral Daily   senna 2 tablet Oral Daily   sertraline 25 mg Oral HS     Network Utilization Review Department  Phone: [phone]; Fax [phone]  [email or web address]  ATTENTION: Please call with any questions or concerns to [phone]  and carefully listen to the prompts so that you are directed to the right person     Send all requests for admission clinical reviews, approved or denied determinations and any other requests to fax [phone]   All voicemails are confidential

## 2019-10-09 NOTE — SOCIAL WORK
CM met with pt and her dtr in a 1 story home which has 1STE  Pt's bathroom has a walk-in shower with built in bench, raised toilet seat, and grab bars  Pt is independent PTA but doesn't drive  Pt owns a walker and BSC  Pt's pharmacy is [facility] on [address]  Pt has no hx of mental health or substance abuse  Pt was at [facility] in the past  Pt used [facility] for [facility] in the past  Pt is recommended for IP rehab but pt and family would like a return home with VNA through [facility]  CM will reach out to agency and follow up  CM reviewed d/c planning process including the following: identifying help at home, patient preference for d/c planning needs, Discharge Lounge, [facility] Meds to Bed program, availability of treatment team to discuss questions or concerns patient and/or family may have regarding understanding medications and recognizing signs and symptoms once discharged  CM also encouraged patient to follow up with all recommended appointments after discharge  Patient advised of importance for patient and family to participate in managing patients medical well being

## 2019-10-09 NOTE — H&P
H&P Exam - Trauma   Agata Lu 80 y o  female MRN: [id number]  Unit/Bed#: [facility] 304-87 Encounter: [id number]    Assessment/Plan   Trauma Alert: Other transfer  Model of Arrival: Ambulance  Trauma Team: Attending [place] Tequila, Resident Melissa Johnson  Consultants: None    Trauma Active Problems: Traumatic subdural hematoma    Trauma Plan: Give DDAVP, consult neurosurgery, hold Plavix and pharmacological VTE prophylaxis, consult gerontology, PT/OT     Chief Complaint: "I have neck pain, but my neck always hurts since I broke my neck in January "    History of Present Illness   HPI:  Agata Lu is a 80 y o  female with a past medical history of CVA on Plavix, hypertension, hyperlipidemia, congestive heart failure, CKD who presents as a trauma transfer from [address] after she was found to have an acute subdural hematoma  The patient states that she was walking in for an appointment with her oncologist for follow-up for remote history of breast cancer, when the sliding door is closed and subsequently caused her to lose her balance and fall  The patient states that she fell hitting her right side and the right side of her head  Denies loss of consciousness, denies any weakness or sensory changes, has no headache, has neck pain but this is chronic for her and she denies any neck pain  Mechanism:Fall    Review of Systems   Constitutional: Negative  Negative for chills and fever  HENT: Negative  Eyes: Negative  Negative for visual disturbance  Respiratory: Negative  Negative for cough and shortness of breath  Cardiovascular: Negative  Negative for chest pain  Gastrointestinal: Negative for abdominal pain, constipation, diarrhea, nausea and vomiting  Endocrine: Negative  Genitourinary: Negative  Negative for dysuria, frequency and urgency  Musculoskeletal: Positive for neck pain and neck stiffness  Skin: Negative  Negative for wound  Allergic/Immunologic: Negative  Neurological: Negative  Negative for dizziness, syncope, speech difficulty, weakness and headaches  Hematological: Negative  Psychiatric/Behavioral: Negative          Historical Information   History is obtained from the patient and the patient's daughter    Past Medical History:   Diagnosis Date    Arthritis     Breast cancer [facility]) 2015    Cardiac disease     aortic valve transplant    CHF (congestive heart failure) (HCC)     Compression fracture of body of thoracic vertebra [facility] [address] [facility]     CVA (cerebral vascular accident) ([facility] [address] )     Diabetes mellitus ([facility] [address] )     Disease of thyroid gland     Fibromyalgia, primary     Hyperlipidemia     Hypertension     Neuropathy     Pressure injury of skin     Renal disorder     kidney stent    Stroke [facility])      Past Surgical History:   Procedure Laterality Date    AORTIC VALVE SURGERY      BREAST LUMPECTOMY Right     BREAST LUMPECTOMY Left     BREAST SURGERY      lumpectomy for breast cancer    CATARACT EXTRACTION      CHOLECYSTECTOMY      HYSTERECTOMY  1978    KIDNEY SURGERY      stent placed for kidney    OTHER SURGICAL HISTORY      abdominal aneurysm surgery    MD ARTHRODESIS POSTERIOR ATLAS-AXIS C1-C2 N/A 5/1/2019    Procedure: C1-C2 lateral mass fixation fusion with possible sublaminar cables;  Surgeon: Sharyn Hutton MD;  [facility];  Service: Neurosurgery    REPLACEMENT TOTAL KNEE      left      Social History   Social History     Substance and Sexual Activity   Alcohol Use Not Currently    Frequency: Never    Binge frequency: Never     Social History     Substance and Sexual Activity   Drug Use No     Social History     Tobacco Use   Smoking Status Never Smoker   Smokeless Tobacco Never Used     Immunization History   Administered Date(s) Administered    DTaP 10/08/2015    INFLUENZA 10/01/2013, 10/06/2014, 11/03/2014, 10/08/2015, 10/04/2016, 10/24/2017, 10/30/2018    Influenza Quadrivalent Preservative Free 3 years and older IM 11/03/2014, 10/08/2015    Influenza Split High Dose Preservative Free IM 10/04/2016, 10/24/2017, 10/30/2018, 09/10/2019    Influenza TIV (IM) 10/01/2013, 10/06/2014    Pneumococcal Conjugate 13-Valent 10/08/2015    Pneumococcal Polysaccharide PPV23 02/07/2017    Tdap 10/08/2015    Zoster 03/12/2009, 03/12/2009     Last Tetanus: 2015  Family History: Non-contributory      Meds/Allergies   all current active meds have been reviewed    Allergies   Allergen Reactions    Duloxetine Hcl Other (See Comments) and Hypertension    Duloxetine Hcl      Cymbalta; Hemorrhagic stroke listed as reaction    Escitalopram      Other reaction(s): Urinary Retention    Pregabalin      Other reaction(s): Hypertension    Statins Myalgia    Tramadol      Other reaction(s): Hypertension    Triprolidine-Pse Other (See Comments)    Anastrozole Abdominal Pain    Anastrozole     Antihistamines, Diphenhydramine-Type     Exemestane      Aromasin; Muscle pain & cramps    Lexapro [Escitalopram]      Urinary retention    Lyrica [Pregabalin]      hypertension    Metformin      diarrhea    Oxycodone     Statins      Muscle pain & cramps    Tramadol      hypertension    Triprolidine-Pseudoephedrine     Metformin Diarrhea         PHYSICAL EXAM      Objective   Vitals:   First set: Temperature: 97 8 °F (36 6 °C) (10/08/19 1801)  Pulse: (!) 54 (10/08/19 1801)  Respirations: 18 (10/08/19 1801)  Blood Pressure: 118/55 (10/08/19 1801)    Primary Survey:   (A) Airway:  Intact  (B) Breathing:  Bilateral breath sounds  (C) Circulation: Pulses:   normal  (D) Disabliity:  GCS Total:  15  (E) Expose:  Completed    Secondary Survey:  Physical Exam   Constitutional: She is oriented to person, place, and time  She appears well-developed and well-nourished  HENT:   Head: Normocephalic  Right Ear: Tympanic membrane normal  No hemotympanum  Left Ear: Tympanic membrane normal  No hemotympanum  Nose: Nose normal  No nasal septal hematoma  Mouth/Throat: Uvula is midline and oropharynx is clear and moist    Scalp hematoma posterior left scalp   Eyes: Pupils are equal, round, and reactive to light  Conjunctivae and EOM are normal    Neck: Phonation normal  No spinous process tenderness present  Cardiovascular: Normal rate, regular rhythm, normal heart sounds and intact distal pulses  No murmur heard  Pulses:       Carotid pulses are 2+ on the right side, and 2+ on the left side  Femoral pulses are 2+ on the right side, and 2+ on the left side  Dorsalis pedis pulses are 2+ on the right side, and 2+ on the left side  Pulmonary/Chest: Effort normal and breath sounds normal  She exhibits no tenderness and no crepitus  Abdominal: Soft  Bowel sounds are normal  There is no tenderness  There is no rigidity  Musculoskeletal: Normal range of motion  Right shoulder: Normal         Left shoulder: Normal         Right elbow: Normal        Left elbow: Normal         Right wrist: Normal         Left wrist: Normal         Right hip: Normal         Left hip: Normal         Right knee: Normal         Left knee: Normal         Right ankle: Normal         Left ankle: Normal         Cervical back: Normal         Thoracic back: Normal         Lumbar back: Normal    Neurological: She is alert and oriented to person, place, and time  She has normal strength  No cranial nerve deficit or sensory deficit  GCS eye subscore is 4  GCS verbal subscore is 5  GCS motor subscore is 6  Patient is alert and oriented to person, place, time, and situation  Speech is normal with no dysarthria, no aphasia  Cranial nerves II-XII intact  Sensation is intact bilaterally upper and lower extremities  Normal muscle tone, no clonus, no atrophy  5/5 muscle strength bilaterally upper extremities, 5/5 muscle strength bilaterally lower extremities  No pronator drift  Skin: Skin is warm, dry and intact  She is not diaphoretic     Psychiatric: She has a normal mood and affect  Her behavior is normal    Vitals reviewed  Invasive Devices     Drain            External Urinary Catheter 2 days                Lab Results: Results: I have personally reviewed pertinent reports  Imaging/EKG Studies: Results: I have personally reviewed pertinent reports        Code Status: Prior  Advance Directive and Living Will: Yes    Power of : Yes  POLST:

## 2019-10-09 NOTE — ASSESSMENT & PLAN NOTE
SDH along left anterior falx s/p fall in doorway at [facility]  · Patient denies LOC  · Currently c/o slight headache and neck pain, denies any focal neuro deficits  · Takes plavix for h/o multiple strokes, most recently 2016   DDAVP given in ED    Imaging:  · CTH w/o, 10/8/19: Tiny acute subdural hematoma at the left paramedian anterior falx  No intraparenchymal hemorrhage  · CTH w/o, 10/9/19: No change in SDH  · CT cervical spine w/o, 10/8/19: No acute spine fracture or subluxation  Hardware intact from C1-C2 posterior fusion for displaced odontoid fx  · Xray cervical spine, 10/8/19: Hardware intact, no evidence of failure or broken hardware from C1-C2 fusion    Plan:  · Repeat CTH shows stable left falx SDH  · Keppra can be prescribed per primary team  · Hold plavix and all other blood thinning medications for 2 weeks  · DVT ppx: SCDs, hold pharm ppx in setting of acute SDH  · Pain control per primary team  · No neurosurgical intervention is anticipated at this time    Neurosurgery will sign off  Please do not hesitate to call with any additional questions  The patient will follow up as outpatient in 2 weeks with repeat CTH

## 2019-10-09 NOTE — CONSULTS
[address] Internal Medicine  Consult- Ivy Harris 1934, 80 y o  female MRN: [id number]    Unit/Bed#: [facility] 612-01 Encounter: [id number]    Primary Care Provider: Eileen Ramey DO   Date and time admitted to hospital: 10/8/2019  5:54 PM      Inpatient consult to Internal Medicine  Consult performed by: Gabi Abbasi PA-C  Consult ordered by: Elli Francis DO        * Traumatic subdural hematoma without loss of consciousness ([facility] [address] )  Assessment & Plan  · Patient sustained a mechanical fall  · CT head:  A tiny acute subdural hematoma at the left paramedian anterior falx  No intraparenchymal hemorrhage  Marked chronic small-vessel ischemic changes and mild volume loss  · Neurosurgery following, no surgical intervention at this time  Repeat CT head pending today  · Management per primary team, trauma  · Patient is on Plavix given history of prior stroke, this is on hold currently  Recommend hold for 2 weeks  · Continue Keppra per Trauma and Neurosurgery    Acute kidney injury [facility])  Assessment & Plan  · HERLINDA, present on arrival   Baseline appears to be 1 2-1 3  · Creatinine on admission 2 60  · There is no BMP for today  · Continue to hold torsemide  · BMP in a m    · Consider nephrology evaluation if no improvement  · Check PVR  · Monitor I&O    Chronic diastolic heart failure (HCC)  Assessment & Plan  Wt Readings from Last 3 Encounters:   10/08/19 84 1 kg (185 lb 6 5 oz)   10/08/19 84 1 kg (185 lb 6 5 oz)   09/24/19 86 6 kg (191 lb)     · Continue metoprolol   · Torsemide on hold currently given acute kidney injury  · Monitor daily weight, I&O  · Follow-up with outpatient cardiologist    Ambulatory dysfunction  Assessment & Plan  · Patient sustained a fall with injuries as noted above  · Fall precautions  · PT/OT evaluations    Essential hypertension  Assessment & Plan  · Continue amlodipine  · Torsemide is on hold given acute kidney injury  · Monitor blood pressure        VTE Prophylaxis: RX contraindicated due to: subdural hematoma  / sequential compression device     Recommendations for Discharge:  · SLIM will follow  · Recommend to f/u with PCP on discharge    Counseling / Coordination of Care Time: 45 minutes  Greater than 50% of total time spent on patient counseling and coordination of care  Collaboration of Care: Were Recommendations Directly Discussed with Primary Treatment Team? - No     History of Present Illness:    Williams Castellon is a 80 y o  female with PMH CKD, CHF, prior stroke, who is originally admitted to the trauma service due to traumatic subdural hematoma status post fall  We are consulted for medical management  The patient had been going to a routine office visit when she sustained a fall when the automatic doors closed in on her  She denies any lightheadedness, dizziness, LOC  She lost her balance and fell and hit her head  She was initially evaluated in [place] ED, and found to have subdural hematoma on CT of the head  For this, she was transferred to [facility] for trauma evaluation  She was admitted to the trauma service  She was seen by Neurosurgery, who did not recommend any surgical intervention at this time  Repeat CT head for today is pending  Today, patient has no acute complaints  She denies any HA, visual changes, n/v, weakness, lightheadedness, dizziness at this time  Just states she is tired  She is falling asleep throughout exam but wakes back up to verbal stimuli  Nursing states she has been like this today and was woken up every hour last night due to HOT protocol  Review of Systems:    Review of Systems   Constitutional: Positive for fatigue  Negative for activity change and appetite change  HENT: Negative  Eyes: Negative for visual disturbance  Respiratory: Negative  Cardiovascular: Negative  Gastrointestinal: Negative  Endocrine: Negative  Genitourinary: Negative  Musculoskeletal: Positive for neck pain (chronic)     Skin: Negative  Neurological: Negative for dizziness, weakness, light-headedness and headaches  Hematological: Negative  Psychiatric/Behavioral: Negative  Past Medical and Surgical History:     Past Medical History:   Diagnosis Date    Arthritis     Breast cancer ([facility] [address] [facility] 2015    Cardiac disease     aortic valve transplant    [facility]     Compression fracture of body of thoracic vertebra ([facility])     CVA (cerebral vascular accident) ([facility] [address] )     Diabetes mellitus ([facility] )     Disease of thyroid gland     Fibromyalgia, primary     Hyperlipidemia     Hypertension     Neuropathy     Pressure injury of skin     Renal disorder     kidney stent    Stroke [facility])        Past Surgical History:   Procedure Laterality Date    AORTIC VALVE SURGERY      BREAST LUMPECTOMY Right     BREAST LUMPECTOMY Left     BREAST SURGERY      lumpectomy for breast cancer    CATARACT EXTRACTION      CHOLECYSTECTOMY      HYSTERECTOMY  1978    KIDNEY SURGERY      stent placed for kidney    OTHER SURGICAL HISTORY      abdominal aneurysm surgery    MS ARTHRODESIS POSTERIOR ATLAS-AXIS C1-C2 N/A 5/1/2019    Procedure: C1-C2 lateral mass fixation fusion with possible sublaminar cables;  Surgeon: Meryle Pluck, MD;  Location: [facility];  Service: Neurosurgery    REPLACEMENT TOTAL KNEE      left        Meds/Allergies:    all medications and allergies reviewed    Allergies: Allergies   Allergen Reactions    Duloxetine Hcl Other (See Comments) and Hypertension    Duloxetine Hcl      Cymbalta;  Hemorrhagic stroke listed as reaction    Escitalopram      Other reaction(s): Urinary Retention    Pregabalin      Other reaction(s): Hypertension    Statins Myalgia    Tramadol      Other reaction(s): Hypertension    Triprolidine-Pse Other (See Comments)    Anastrozole Abdominal Pain    Anastrozole     Antihistamines, Diphenhydramine-Type     Exemestane      Aromasin; Muscle pain & cramps    Lexapro [Escitalopram]      Urinary retention    Lyrica [Pregabalin]      hypertension    Metformin      diarrhea    Oxycodone     Statins      Muscle pain & cramps    Tramadol      hypertension    Triprolidine-Pseudoephedrine     Metformin Diarrhea       Social History:     Marital Status:     Substance Use History:   Social History     Substance and Sexual Activity   Alcohol Use Not Currently    Frequency: Never    Binge frequency: Never     Social History     Tobacco Use   Smoking Status Never Smoker   Smokeless Tobacco Never Used     Social History     Substance and Sexual Activity   Drug Use No       Family History:    Family History   Problem Relation Age of Onset    Heart disease Mother     Arthritis Mother     Diabetes Mother     Heart failure Father     Arthritis Father     Other Father         enlargement of prostate     Dementia Father     No Known Problems Daughter     No Known Problems Maternal Grandmother     No Known Problems Maternal Grandfather     No Known Problems Paternal Grandmother     No Known Problems Paternal Grandfather     No Known Problems Maternal Aunt     No Known Problems Maternal Aunt     No Known Problems Maternal Aunt     No Known Problems Maternal Aunt     No Known Problems Paternal Aunt     No Known Problems Paternal Aunt        Physical Exam:     Vitals:   Blood Pressure: 122/62 (10/09/19 1126)  Pulse: 59 (10/09/19 1126)  Temperature: 97 6 °F (36 4 °C) (10/09/19 1126)  Temp Source: Oral (10/09/19 1126)  Respirations: 14 (10/09/19 1126)  Height: 4' 11" (149 9 cm) (10/08/19 1801)  Weight - Scale: 84 1 kg (185 lb 6 5 oz) (10/08/19 1801)  SpO2: 96 % (10/09/19 1126)    Physical Exam   Constitutional: She is oriented to person, place, and time  No distress  Falls asleep throughout exam however easily aroused to verbal stimuli and will wake right back up   HENT:   Head: Normocephalic and atraumatic  Eyes: EOM are normal  No scleral icterus     Neck: Normal range of motion  Neck supple  Cardiovascular: Normal rate and regular rhythm  Pulmonary/Chest: Effort normal and breath sounds normal  No respiratory distress  She has no wheezes  Abdominal: Soft  Bowel sounds are normal  She exhibits no distension  There is no tenderness  Musculoskeletal: Normal range of motion  Neurological: She is alert and oriented to person, place, and time  Follows commands   Skin: Skin is warm and dry  She is not diaphoretic  Psychiatric: She has a normal mood and affect  Her behavior is normal    Vitals reviewed  Additional Data:     Lab Results: I have personally reviewed pertinent reports  Results from last 7 days   Lab Units 10/09/19  0436 10/08/19  1708   WBC Thousand/uL 6 53 7 41   HEMOGLOBIN g/dL 9 3* 10 7*   HEMATOCRIT % 30 0* 34 6*   PLATELETS Thousands/uL 157 190   NEUTROS PCT %  --  72   LYMPHS PCT %  --  20   MONOS PCT %  --  5   EOS PCT %  --  1     Results from last 7 days   Lab Units 10/08/19  2029   SODIUM mmol/L 143   POTASSIUM mmol/L 3 4*   CHLORIDE mmol/L 104   CO2 mmol/L 31   BUN mg/dL 60*   CREATININE mg/dL 2 44*   ANION GAP mmol/L 8   CALCIUM mg/dL 9 8   GLUCOSE RANDOM mg/dL 133     Results from last 7 days   Lab Units 10/08/19  1708   INR  1 05         Lab Results   Component Value Date/Time    HGBA1C 5 0 08/16/2019 04:39 AM    HGBA1C 4 8 04/09/2019 01:24 PM    HGBA1C 5 2 01/18/2019 05:50 AM    HGBA1C 5 7 (H) 11/06/2015 11:05 AM    HGBA1C 5 5 07/02/2015 10:32 AM    HGBA1C 5 4 01/19/2015 11:19 AM               Imaging: I have personally reviewed pertinent reports  CT head wo contrast    (Results Pending)   CT head wo contrast    (Results Pending)       EKG, Pathology, and Other Studies Reviewed on Admission:   · EKG:     ** Please Note: This note has been constructed using a voice recognition system   **

## 2019-10-09 NOTE — ASSESSMENT & PLAN NOTE
· HERLINDA, present on arrival   Baseline appears to be 1 2-1 3  · Creatinine on admission 2 60  · There is no BMP for today  · Continue to hold torsemide  · BMP in a m    · Consider nephrology evaluation if no improvement  · Check PVR  · Monitor I&O

## 2019-10-09 NOTE — CONSULTS
Consult- Chadwick Flood 1934, 80 y o  female MRN: [id number]    Unit/Bed#: [facility] 612-01 Encounter: [id number]    Primary Care Provider: Hammad Spencer DO   Date and time admitted to hospital: 10/8/2019  5:54 PM      Inpatient consult to Neurosurgery  Consult performed by: Alejandro Eduardo PA-C  Consult ordered by: Yair Finney DO      Imaging personally reviewed with attending 10/9/19 at 7:30am  Patient examined at bedside 10/9/19 at 12:15pm    * Traumatic subdural hematoma without loss of consciousness ([facility] [address] )  Assessment & Plan  SDH along left anterior falx s/p fall in doorway at [facility]  · Patient denies LOC  · Currently c/o slight headache and neck pain, denies any focal neuro deficits  · Takes plavix for h/o multiple strokes, most recently 2016   DDAVP given in ED    Imaging:  · CTH w/o, 10/8/19: Tiny acute subdural hematoma at the left paramedian anterior falx  No intraparenchymal hemorrhage  · CTH w/o, 10/9/19: No change in SDH  · CT cervical spine w/o, 10/8/19: No acute spine fracture or subluxation  Hardware intact from C1-C2 posterior fusion for displaced odontoid fx  · Xray cervical spine, 10/8/19: Hardware intact, no evidence of failure or broken hardware from C1-C2 fusion    Plan:  · Repeat CTH shows stable left falx SDH  · Keppra can be prescribed per primary team  · Hold plavix and all other blood thinning medications for 2 weeks  · DVT ppx: SCDs, hold pharm ppx in setting of acute SDH  · Pain control per primary team  · No neurosurgical intervention is anticipated at this time    Neurosurgery will sign off  Please do not hesitate to call with any additional questions  The patient will follow up as outpatient in 2 weeks with repeat CTH      History of Present Illness     HPI: Chadwick Flood is a 80y o  year old female with PMH including multiple CVA (2016 most recent) on Plavix, HTN, HLD, CHF, CKD, and bilateral breast cancer who presents as a trauma alert from [facility]  She was found to have a small left SDH along the falx after the automatic doors in the cancer center closed on her walker, causing her to fall backwards  She did strike her head but did not lose consciousness  She uses a walker at baseline  She denies any weakness, numbness or tingling of her bilateral extremities  She does have a slight headache and neck pain (chronic)  Otherwise, she denies dizziness, seizures, syncope, confusion, vision changes, speech difficulty, or any other focal neuro deficits  Review of Systems   Constitutional: Negative  HENT: Negative  Eyes: Negative  Respiratory: Negative  Negative for chest tightness and shortness of breath  Cardiovascular: Negative  Negative for chest pain, palpitations and leg swelling  Gastrointestinal: Negative  Endocrine: Negative  Genitourinary: Negative  Musculoskeletal: Positive for back pain and neck pain  Skin: Negative  Neurological: Positive for headaches  Negative for dizziness, seizures, syncope, weakness and light-headedness  Hematological: Negative  Psychiatric/Behavioral: Negative          Historical Information   Past Medical History:   Diagnosis Date    Arthritis     Breast cancer ([facility] [address] ) 2015    Cardiac disease     aortic valve transplant    [facility]     Compression fracture of body of thoracic vertebra ([facility])     CVA (cerebral vascular accident) ([facility] [address] )     Diabetes [facility]     Disease of thyroid gland     Fibromyalgia, primary     Hyperlipidemia     Hypertension     Neuropathy     Pressure injury of skin     Renal disorder     kidney stent    Stroke [facility])      Past Surgical History:   Procedure Laterality Date    AORTIC VALVE SURGERY      BREAST LUMPECTOMY Right     BREAST LUMPECTOMY Left     BREAST SURGERY      lumpectomy for breast cancer    CATARACT EXTRACTION      CHOLECYSTECTOMY      HYSTERECTOMY  1978    KIDNEY SURGERY      stent placed for kidney    OTHER SURGICAL HISTORY      abdominal aneurysm surgery    [place] ARTHRODESIS POSTERIOR ATLAS-AXIS C1-C2 N/A 5/1/2019    Procedure: C1-C2 lateral mass fixation fusion with possible sublaminar cables;  Surgeon: Erica Alfonso MD;  [facility];  Service: Neurosurgery    REPLACEMENT TOTAL KNEE      left      Social History     Substance and Sexual Activity   Alcohol Use Not Currently    Frequency: Never    Binge frequency: Never     Social History     Substance and Sexual Activity   Drug Use No     Social History     Tobacco Use   Smoking Status Never Smoker   Smokeless Tobacco Never Used     Family History   Problem Relation Age of Onset    Heart disease Mother     Arthritis Mother     Diabetes Mother     Heart failure Father     Arthritis Father     Other Father         enlargement of prostate     Dementia Father     No Known Problems Daughter     No Known Problems Maternal Grandmother     No Known Problems Maternal Grandfather     No Known Problems Paternal Grandmother     No Known Problems Paternal Grandfather     No Known Problems Maternal Aunt     No Known Problems Maternal Aunt     No Known Problems Maternal Aunt     No Known Problems Maternal Aunt     No Known Problems Paternal Aunt     No Known Problems Paternal Aunt        Meds/Allergies   all current active meds have been reviewed, current meds:   Current Facility-Administered Medications   Medication Dose Route Frequency    acetaminophen (TYLENOL) tablet 975 mg  975 mg Oral Q8H [address]    amLODIPine (NORVASC) tablet 5 mg  5 mg Oral QAM    calcium carbonate-vitamin D (OSCAL-D) 500 mg-200 units per tablet 1 tablet  1 tablet Oral BID With Meals    docusate sodium (COLACE) capsule 100 mg  100 mg Oral BID    gabapentin (NEURONTIN) capsule 300 mg  300 mg Oral HS    HYDROmorphone (DILAUDID) injection 0 2 mg  0 2 mg Intravenous Q3H PRN    levETIRAcetam (KEPPRA) tablet 500 mg  500 mg Oral BID    [START ON 10/10/2019] levothyroxine tablet 112 mcg  112 mcg Oral Early Morning    lidocaine (LIDODERM) 5 % patch 1 patch  1 patch Topical Daily    methocarbamol (ROBAXIN) tablet 500 mg  500 mg Oral TID PRN    multivitamin-minerals (CENTRUM ADULTS) tablet 1 tablet  1 tablet Oral Daily    ondansetron (ZOFRAN) injection 4 mg  4 mg Intravenous Q6H PRN    oxyCODONE (ROXICODONE) IR tablet 2 5 mg  2 5 mg Oral Q4H PRN    oxyCODONE (ROXICODONE) IR tablet 5 mg  5 mg Oral Q4H PRN    polyethylene glycol (MIRALAX) packet 17 g  17 g Oral Daily    senna (SENOKOT) tablet 17 2 mg  2 tablet Oral Daily    sertraline (ZOLOFT) tablet 25 mg  25 mg Oral HS    and PTA meds:   Prior to Admission Medications   Prescriptions Last Dose Informant Patient Reported? Taking?    Multiple Vitamins-Calcium (MULTI-DAY/CALCIUM/EXTRA IRON PO)  Self Yes No   Sig: Take 1 tablet by mouth daily   Multiple Vitamins-Minerals (CENTRUM ADULTS PO)  Self Yes No   Sig: Centrum   Omega-3 Fatty Acids (FISH OIL) 1,000 mg  Self Yes No   Sig: Fish Oil 1,000 mg capsule   acetaminophen (TYLENOL) 325 mg tablet  Self No No   Sig: Take 2 tablets (650 mg total) by mouth every 6 (six) hours as needed for mild pain   Patient taking differently: Take 1,000 mg by mouth 3 (three) times a day as needed for mild pain    albuterol (PROVENTIL HFA,VENTOLIN HFA) 90 mcg/act inhaler  Self Yes No   Sig: Inhale 2 puffs every 4 (four) hours as needed for wheezing   amLODIPine (NORVASC) 5 mg tablet  Self Yes No   Sig: Take 5 mg by mouth every morning    calcium carbonate-vitamin D (OSCAL-D) 500 mg-200 units per tablet  Self Yes No   Sig: Take 1 tablet by mouth 2 (two) times a day with meals     clopidogrel (PLAVIX) 75 mg tablet  Self Yes Yes   Sig: Take 75 mg by mouth daily   gabapentin (NEURONTIN) 300 mg capsule  Self No No   Sig: Take 2 capsules PO three times a day   hydroxychloroquine (PLAQUENIL) 200 mg tablet  Self Yes No   Sig: Take 200 mg by mouth daily    levothyroxine 112 mcg tablet   Yes Yes Sig: TAKE 1 TABLET DAILY   lidocaine (LIDODERM) 5 %  Self Yes No   Sig: Apply 2 patches topically daily Remove & Discard patch within 12 hours or as directed by MD     methocarbamol (ROBAXIN) 500 mg tablet  Self No No   Sig: Take 1 tablet (500 mg total) by mouth 3 (three) times a day as needed for muscle spasms   metoprolol tartrate (LOPRESSOR) 25 mg tablet  Self Yes No   Sig: Take 25 mg by mouth 2 (two) times a day    nitroglycerin (NITROSTAT) 0 4 mg SL tablet  Self Yes No   Sig: Place under the tongue every 5 (five) minutes as needed for chest pain    nystatin (MYCOSTATIN) powder  Self Yes No   Sig: Apply topically as needed    potassium chloride (K-DUR,KLOR-CON) 10 mEq tablet  Self No No   Sig: Take 1 tablet (10 mEq total) by mouth daily   sertraline (ZOLOFT) 25 mg tablet  Self Yes No   Si mg daily at bedtime    torsemide (DEMADEX) 20 mg tablet  Self No No   Sig: Take 2 tablets (40 mg total) by mouth daily      Facility-Administered Medications: None     Allergies   Allergen Reactions    Duloxetine Hcl Other (See Comments) and Hypertension    Duloxetine Hcl      Cymbalta; Hemorrhagic stroke listed as reaction    Escitalopram      Other reaction(s): Urinary Retention    Pregabalin      Other reaction(s): Hypertension    Statins Myalgia    Tramadol      Other reaction(s): Hypertension    Triprolidine-Pse Other (See Comments)    Anastrozole Abdominal Pain    Anastrozole     Antihistamines, Diphenhydramine-Type     Exemestane      Aromasin; Muscle pain & cramps    Lexapro [Escitalopram]      Urinary retention    Lyrica [Pregabalin]      hypertension    Metformin      diarrhea    Oxycodone     Statins      Muscle pain & cramps    Tramadol      hypertension    Triprolidine-Pseudoephedrine     Metformin Diarrhea       Objective   I/O       10/07 0701 - 10/08 0700 10/08 0701 - 10/09 0700 10/09 0701 - 10/10 07    P  O    180    IV Piggyback  50     Total Intake(mL/kg)  50 (0 6) 180 (2 1) Urine (mL/kg/hr)  250     Total Output  250     Net  -200 +180           Unmeasured Urine Occurrence   1 x          Physical Exam   Constitutional: She is oriented to person, place, and time  She appears well-developed and well-nourished  HENT:   Head: Normocephalic and atraumatic  Eyes: Pupils are equal, round, and reactive to light  EOM are normal    Neck: Normal range of motion  TTP cervical spine and paraspinal muscles   Cardiovascular: Normal rate  Pulmonary/Chest: Effort normal  No respiratory distress  Abdominal: Soft  Musculoskeletal: Normal range of motion  Neurological: She is alert and oriented to person, place, and time  She has normal strength  She has a normal Finger-Nose-Finger Test    Reflex Scores:       Tricep reflexes are 1+ on the right side and 1+ on the left side  Bicep reflexes are 1+ on the right side and 1+ on the left side  Brachioradialis reflexes are 1+ on the right side and 1+ on the left side  Patellar reflexes are 1+ on the right side and 1+ on the left side  Achilles reflexes are 1+ on the right side and 1+ on the left side  Skin: Skin is warm and dry  Psychiatric: She has a normal mood and affect  Her speech is normal and behavior is normal  Judgment and thought content normal    Nursing note and vitals reviewed  Neurologic Exam     Mental Status   Oriented to person, place, and time  Recall at 5 minutes: recalls 3 of 3 objects  Follows 2 step commands  Attention: normal  Concentration: normal    Speech: speech is normal   Level of consciousness: alert  Knowledge: good  Able to perform simple calculations  Able to repeat  Normal comprehension  Cranial Nerves   Cranial nerves II through XII intact  CN III, IV, VI   Pupils are equal, round, and reactive to light    Extraocular motions are normal      Motor Exam   Muscle bulk: normal  Overall muscle tone: normal  Right arm pronator drift: absent  Left arm pronator drift: absent    Strength   Strength 5/5 throughout  Sensory Exam   Light touch normal    JPS and DST intact bilaterally     Gait, Coordination, and Reflexes     Coordination   Finger to nose coordination: normal    Tremor   Resting tremor: absent    Reflexes   Right brachioradialis: 1+  Left brachioradialis: 1+  Right biceps: 1+  Left biceps: 1+  Right triceps: 1+  Left triceps: 1+  Right patellar: 1+  Left patellar: 1+  Right achilles: 1+  Left achilles: 1+  Right : 1+  Left : 1+  Right Anderson: absent  Left Anderson: absent  Right ankle clonus: absent  Left ankle clonus: absent        Vitals:Blood pressure 122/62, pulse 59, temperature 97 6 °F (36 4 °C), temperature source Oral, resp  rate 14, height 4' 11" (1 499 m), weight 84 1 kg (185 lb 6 5 oz), SpO2 96 %, not currently breastfeeding  ,Body mass index is 37 45 kg/m²  Lab Results:   Results from last 7 days   Lab Units 10/09/19  0436 10/08/19  1708   WBC Thousand/uL 6 53 7 41   HEMOGLOBIN g/dL 9 3* 10 7*   HEMATOCRIT % 30 0* 34 6*   PLATELETS Thousands/uL 157 190   NEUTROS PCT %  --  72   MONOS PCT %  --  5     Results from last 7 days   Lab Units 10/08/19  2029 10/08/19  1708   POTASSIUM mmol/L 3 4* 6 0*   CHLORIDE mmol/L 104 99*   CO2 mmol/L 31 31   BUN mg/dL 60* 61*   CREATININE mg/dL 2 44* 2 60*   CALCIUM mg/dL 9 8 10 4*             Results from last 7 days   Lab Units 10/08/19  1708   INR  1 05   PTT seconds 23     No results found for: TROPONINT  ABG:No results found for: PHART, ECS9SNU, PO2ART, GHB3QIH, J9CLSBFU, BEART, SOURCE    Imaging Studies: I have personally reviewed pertinent reports  and I have personally reviewed pertinent films in PACS     Xr Spine Cervical Complete 6+ Vw Flex/ext/obl  Result Date: 9/20/2019  Impression: No plain film evidence of instability  Hardware appears intact   Workstation performed: [id number]     Ct Head Without Contrast  Result Date: 10/8/2019  Impression: Tiny acute subdural hematoma at the left paramedian anterior falx   No intraparenchymal hemorrhage  Marked chronic small vessel ischemic changes and mild volume loss  I personally discussed this study with Ruby Cherry on 10/8/2019 at 4:22 PM  Workstation performed: [id number]     Ct Cervical Spine Without Contrast  Result Date: 10/8/2019  Impression: No acute cervical spine fracture or subluxation  Postoperative changes of posterior cervical fusion at C1 and C2 for repair of a displaced odontoid fracture  There is stable 5 mm of anterior displacement of the odontoid process in relation to the body of C2  Moderate to marked 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 with multilevel anterior bridging osteophytes  There is a stable previously described nondisplaced fracture of an anterior bridging osteophyte at C6  Workstation performed: [id number]     EKG, Pathology, and Other Studies: I have personally reviewed pertinent reports  VTE Prophylaxis: Sequential compression device (Venodyne)     Code Status: Level 1 - Full Code  Advance Directive and Living Will: Yes    Power of : Yes  POLST:      Counseling / Coordination of Care  I spent 45 minutes with the patient

## 2019-10-09 NOTE — ASSESSMENT & PLAN NOTE
- Chronic history of essential hypertension   - Continue home medication regimen, Norvasc, but adjust hold parameter for systolic blood pressure of 130 mmHg in setting of acute kidney injury

## 2019-10-09 NOTE — OCCUPATIONAL THERAPY NOTE
[address] Evaluation     Patient Name: Eliana PERESDIEMELIA Date: 10/9/2019  Problem List  Principal Problem:    Traumatic subdural hematoma without loss of consciousness ([facility] [address] )  Active Problems:    Essential hypertension    Ambulatory dysfunction    Chronic diastolic heart failure (HCC)    Stage 3 chronic kidney disease ([facility] [address] )    Acute kidney injury ([facility] [address] )    Past Medical History  Past Medical History:   Diagnosis Date    Arthritis     Breast cancer ([facility] [address] ) 2015    Cardiac disease     aortic valve transplant    CHF (congestive heart failure) ([facility] [facility] )     Compression fracture of body of thoracic vertebra ([facility] [facility] )     CVA (cerebral vascular accident) ([facility] [facility] )     Diabetes mellitus ([facility] [facility] )     Disease of thyroid gland     Fibromyalgia, primary     Hyperlipidemia     Hypertension     Neuropathy     Pressure injury of skin     Renal disorder     kidney stent    Stroke [facility])      Past Surgical History  Past Surgical History:   Procedure Laterality Date    AORTIC VALVE SURGERY      BREAST LUMPECTOMY Right     BREAST LUMPECTOMY Left     BREAST SURGERY      lumpectomy for breast cancer    CATARACT EXTRACTION      CHOLECYSTECTOMY      HYSTERECTOMY  1978    KIDNEY SURGERY      stent placed for kidney    OTHER SURGICAL HISTORY      abdominal aneurysm surgery    [place] ARTHRODESIS POSTERIOR ATLAS-AXIS C1-C2 N/A 5/1/2019    Procedure: C1-C2 lateral mass fixation fusion with possible sublaminar cables;  Surgeon: Maria E Laguna MD;  [facility];  Service: Neurosurgery    REPLACEMENT TOTAL KNEE      left          10/09/19 1200   Note Type   Note type Eval/Treat   Restrictions/Precautions   Weight Bearing Precautions Per Order No   Other Precautions Cognitive; Fall Risk;Pain   Pain Assessment   Pain Assessment 0-10   Pain Score 6   Pain Type Acute pain   Pain Location Back   Patient's Stated Pain Goal No pain   Hospital Pain Intervention(s) Medication (See MAR); Repositioned; Ambulation/increased activity; Distraction; Emotional support   Response to Interventions TOLERATED    Home Living   Type of [place] to enter with rails;Multi-level; Able to live on main level with bedroom/bathroom  (1 ISRA )   Bathroom Shower/Tub Tub/shower unit   [employer] Raised   Bathroom Equipment Grab bars in shower; Shower chair;Grab bars around toilet   [facility]   Additional Comments PT REPORTS USE OF RW, SC AND GRAB BARS PTA  PT LIVES ON THE 1ST FLOOR OF FAMILY'S HOME   Prior Function   Level of [place] Independent with ADLs and functional mobility; Needs assistance with IADLs   Lives With Family   Receives Help From Family   ADL Assistance Independent   IADLs Needs assistance   Falls in the last 6 months 1 to 4   Vocational Retired   Lifestyle   Autonomy PT/PT'S DAUGHTER REPORT PT HAS OVERALL SUPERVISION WITH ADLS AND ASSIST AS NEEDED WITH IADLS INCLUDING SET-UP FOR MEDICATION    Reciprocal Relationships LIVES ON THE 1ST FLOOR OF [place]  DAUGHTER AND HER FAMILY LIVE UPSTAIRS  DAUGHTER REPORTS PT IS ALONE AT TIMES  Service to Others RETIRED   Intrinsic Gratification ENJOYS SPENDING TIME WITH FAMILY    Psychosocial   Psychosocial (WDL) WDL   ADL   Eating Assistance 7  Independent   Grooming Assistance 5  Supervision/Setup   UB Bathing Assistance 4  Minimal Assistance   LB Bathing Assistance 2  Maximal Assistance   UB Dressing Assistance 4  Minimal Assistance   LB Dressing Assistance 2  Maximal Assistance   Toileting Assistance  3  Moderate Assistance   Functional Assistance 3  Moderate Assistance   Bed Mobility   Additional Comments PT SITTING OOB UPON ARRIVAL  PT LEFT WITH ALL NEEDS IN REACH AND DAUGHTER PRESENT    Transfers   Sit to Stand 4  Minimal assistance   Additional items Assist x 1; Increased time required;Verbal cues   Stand to Sit 4  Minimal assistance   Additional items Assist x 1; Increased time required;Verbal cues   Functional Mobility   Functional Mobility 4  Minimal assistance   Additional Comments + CHAIR FOLLOW FOR SAFETY    Additional items Rolling walker   Balance   Static Sitting Fair   Static Standing Fair -   Ambulatory Poor +   Activity Tolerance   Activity Tolerance Patient limited by pain; Patient limited by fatigue   Medical Staff Made Aware SPOKE TO CM REGARDING D/C PLAN    Nurse Made Aware APPROPRIATE TO SEE PER RN, Joaquín Willson  RN AWARE OF PT'S REQUEST FOR PAIN MEDS    RUE Assessment   RUE Assessment WFL   LUE Assessment   LUE Assessment WFL   Hand Function   Gross Motor Coordination Functional   Fine Motor Coordination Functional   Sensation   Light Touch No apparent deficits   Vision - Complex Assessment   Additional Comments PT REPORTS MILD PHOTOPHOBIA AND DOUPLE VISION WHEN LOOKING TOWARDS THE R HOWEVER REPORTS THIS IS CHRONIC    Cognition   Overall Cognitive Status Impaired   Arousal/Participation Alert; Cooperative   Attention Attends with cues to redirect   Orientation Level Oriented X4   Memory Decreased short term memory;Decreased recall of recent events   Following Commands Follows one step commands without difficulty   Comments PT IS PLEASANT AND COOPERATIVE  PT DEFERS TO DAUGHTER AT TIMES TO ASSIST WITH ANSWERING QUESTIONS  LIMITED INSIGHT INTO DEFICITS    Assessment   Limitation Decreased ADL status; Decreased Safe judgement during ADL;Decreased cognition;Decreased endurance;Decreased self-care trans;Decreased high-level ADLs   Prognosis Good;Fair   Assessment 79 YO Female SEEN FOR INITIAL OCCUPATIONAL THERAPY EVALUATION FOLLOWING TXF FROM SLT->SLB S/P FALL WITH +HEADSTRIKE, -LOC  PT FOUND TO ACUTE ACUTE SUBDURAL HEMATOMA  PROBLEMS LIST INCLUDES H/O CVA, CHF, HTN, HLD, CKD, DM, FIBROMYALGIA AND H/O FALLS WITH NECK INJURY, CURRENTLY RECEIVING OUTPT THERAPY SERVICES  PT'S DAUGHTER PRESENT TO ASSIST WITH ANSWERING QUESTIONS  PT LIVE SON THE 1ST FLOOR OF FAMILY'S HOME WITH DAUGHTER AND DAUGHTER'S FAMILY LIVING UPSTAIRS  PT REPORT BEING OVERALL INDEPENDENT WITH ADLS WITH SUPERVISION WITH BATHING AND HAS ASSIST AS NEEDED WITH IADLS, INCLUDING SET-UP WITH MEDIATIONS  PT CURRENTLY REQUIRES OVERALL MOD-MAX A WITH ADLS, AND MIN A WITH TRANSFERS / FUNCTIONAL MOBILITY WITH USE OF RW  PT IS LIMITED 2' PAIN, FATIGUE, IMPAIRED BALANCE, FALL RISK , LIMITED INSIGHT INTO DEFICITS, OVERALL WEAKNESS/DECONDITIONING  and OVERALL LIMITED ACTIVITY TOLERANCE  PT'S DAUGHTER REPORTS PT WITH MILD INCREASED "WHIFTINESS" FOLLOWING FALL  PT EDUCATED ON DEEP BREATHING TECHNIQUES T/O ACTIVITY, SLOWING OF PACE, INCREASED FAMILY SUPPORT and CONTINUE PARTICIPATION IN SELF-CARE/MOBILITY WITH STAFF [address]   FROM AN OCCUPATIONAL THERAPY PERSPECTIVE, PT WOULD BENEFIT FROM ADDITIONAL OT SERVICES IN AN INPT REHAB SETTING UPON D/C  DAUGHTER REPORTS SHE WISHES FOR PT TO RETURN DIRECTLY HOME- IF FAMILY CONTS TO REFUSE REHAB, RECOMMEND INCREASED HHA AT ALL TIMES + HOME THERAPY  WILL CONT TO FOLLOW TO ADDRESS THE BELOW DESCRIBED GOALS  Goals   Patient Goals TO RETURN HOME    LTG Time Frame 10-14   Long Term Goal #1 SEE BELOW    Plan   Treatment Interventions ADL retraining;Functional transfer training; Endurance training;Cognitive reorientation;Patient/family training;Equipment evaluation/education; Compensatory technique education; Energy conservation; Activityengagement   Goal Expiration Date 10/23/19   OT Frequency 3-5x/wk   Recommendation   OT Discharge Recommendation Short Term Rehab   OT - OK to Discharge Yes   Barthel Index   Feeding 10   Bathing 0   Grooming Score 0   Dressing Score 5   Bladder Score 5   Bowels Score 5   Toilet Use Score 5   Transfers (Bed/Chair) Score 5   Mobility (Level Surface) Score 0   Stairs Score 0   Barthel Index Score 35   Modified Bristol Scale   Modified Bristol Scale 4       OCCUPATIONAL THERAPY GOALS TO BE MET WITHIN 10-14 DAYS:    -Pt will complete additional cognitive assessment with 100% attention to task in order to assist with safe d/c plan  -Pt will increase bed mobility to MOD I to participate in functional activities with F+ tolerance and balance  -Pt will improve functional mobility and transfers to MOD I on/off all surfaces w/ F+ balance including toileting   -Pt will increase independence in all ADLS to S LEVEL with F+ balance sitting upright in chair   -Pt will improve activity tolerance to G for 20 min txment sessions w/ G carry over of learned energy conservation techniques   -Pt will demonstrate G carryover of learned safety techniques and proper body mechanics in functional and leisure activities with use of DME      Documentation completed by Geraldo Rodriguez, JUNG, OTR/L

## 2019-10-09 NOTE — ASSESSMENT & PLAN NOTE
Wt Readings from Last 3 Encounters:   10/08/19 84 1 kg (185 lb 6 5 oz)   10/08/19 84 1 kg (185 lb 6 5 oz)   09/24/19 86 6 kg (191 lb)     - History of chronic diastolic heart failure with acute exacerbation earlier this year  - Followed by [facility]iology, Dr Pina Lou  Per daughter, the patient was recently started on a diuretic for significant volume overload  Due to acute kidney injury and suspected dehydration, diuretic therapy to be held at this time  - Check daily weights and closely monitor creatinine   - Will likely need some chronic diuretic therapy once acute kidney injury is resolved  - Await Internal Medicine evaluation and recommendations  - Close follow-up with Cardiology as an outpatient

## 2019-10-09 NOTE — CONSULTS
Consultation - Geriatrics   Pako Wood 80 y o  female MRN: [id number]  Unit/Bed#: [facility] 612-01 Encounter: [id number]      Assessment/Plan    Mechanical fall/ Subdural hematoma  On Plavix for CVA/ DDAVP given   Plavix on hold for 2 weeks as Neurosurgery team  Ct brain: acute subdural hematoma at the left paramedian anterior falx  On Keppra 500 mg PO BID for seizer prevention  Denies headache, nausea, vomiting or vision change   Neck pain / Geriatric pain control with Tylenol 975 mg Q8H PO  Lidocaine patch 5%   Diaudid 0 2 mg Q4H PRN IV  Oxycodone IR 2 5 mg Q4H PRN PO    HERLINDA on CKD stage  3  Bun/Cr: 60/2 44  K: 3 4   Avoid nephrotoxic drugs  Patient is on Gabapentin 600 mg PO TID total 1800 mg/day  Consider reducing her morning doses    Urinary Incontinence  Encourage scheduled toilet   Reduce fluids intake before bed   Fall precaution/ assistant with trans  On diuretics and has severe mixed incontinence  Consider Urogynecology consult on discharge    Ambulatory dysfunction   Multifactorial with current acute and  chronic medical conditions, hx of two CVA, urinary incontinence/ previous fall with neck injury  Uses walker and cane at home  CT Brain: Tiny acute subdural hematoma at the left paramedian anterior falx  Ct Spine: Postoperative changes of posterior cervical fusion at C1 and C2 for repair of a displaced odontoid fracture  There is stable 5 mm of anterior displacement of the odontoid process in relation to the body of C2  Continue Fall precaution   Encourage assistance with all transfers  PT and OT evaluation and treat    Frailty/Deconditioning/ Debility  Multifactorial  with acute and chronic medical condition   Continue fall precaution  Nutrition and  hydration support    Cognition Assesment  MiniCog: 3/5 has recent memory issues going on  Family hx of dementia in father  Hx CVA twice  2005/2008   CT head has Marked chronic small vessel ischemic changes and mild volume loss     Consider comprehensive geriatric assessment as out patient  Continue frequent reorientation and redirection  Encourage family and friends involvement  Delirium precaution  High risk with trauma/ pain/ hospitalization/ possible previous hx of Delirium according to daughter  Maintain sleep and wake cycle  Less interruption during the night   Avoid medication such as benzodiazepine, tramadol, benadryl, anticholinergics    Congestive Hear failure   EF: 55% 08/2019  Was on Lasix 40 mg po qd d/c due to acute kidney injury  Weight monitoring / lost 12 lbs since August  Had acute excerebration in August     History of Present Illness   Physician Requesting Consult: Matthew Harman MD  Reason for Consult / Principal Problem: Fall, Ambulatory dysfunction/ Subdural hematoma  Hx and PE limited by: none  Additional hx obtained from daughter    HPI: Josiane Bailey is a 80y o  year old female patient transferred from [facility] with Subdural hematoma following a mechanical fall seen and examined for geriatric consultation  Patient stated that when she walked towards the automatic door for her oncologist appointment the door shuts before she went throug  Patient was with her walker and lost balance when the door hit her walker and fell hitting her right side  She denies any LOC, nausea, vomiting  Patient has known medical history of CHF, Type 2 DM, HTN,  HLD, 2 CVA on Plavix  Patient had a fall in January/2019 at her home during which she had her odontoid fracture, closed non displaced fracture C6  Patient denies any other fall since then  Patient using her walker since the neck injury  Patient states that she uses cane since her stroke  She had first CVA in 2005 and second one on 2008 when she had weakness on her right side of her upper and lower limb  Patient daughter states that since August she is been more sleepy and difficult to wake her up in the morning   She had her clock drawing 2 times before this admission in past 2- 3months  She did well in did well in her doctors office and bad in the hospital settings  Daughter also worried regarding her antidepressant she was started when the patient was taking her care of her   with Lewy body dementia  Patient lives with her daughter and family  Patient is self sufficient to take care of her self  Daughter helps her with medication, driving, shopping  Patient uses walker in regular basis and had a fall in January/ 2019  Patient denies any visual or hearing impairment  She uses reading glasses  She has dentures both upper and lower  Patient has urinary incontinence  On examination patient exhibit pain in her back  Juan any headache, numbness, tingling, nausea, or visual changes  She remembered 3/3 words and 0 in clock drawing  Inpatient consult to Gerontology     Date/Time 10/9/2019 9:55 AM     Performed by  Grey Ramon MD     Authorized by Elli Francis DO              Review of Systems   Constitutional: Positive for activity change and fatigue  Negative for fever  Lost 12 lb in 4 months/ on diuretics for CHF    HENT: Negative for congestion, ear pain, hearing loss, trouble swallowing and voice change  Eyes: Negative for photophobia and visual disturbance  Respiratory: Negative for cough, chest tightness and wheezing  Cardiovascular: Negative for chest pain, palpitations and leg swelling  Gastrointestinal: Negative for abdominal distention, abdominal pain, diarrhea, nausea and vomiting  Genitourinary: Negative for difficulty urinating, dysuria, frequency and urgency  Musculoskeletal: Positive for back pain and neck pain  Negative for arthralgias and gait problem  Skin: Negative  Neurological: Negative for dizziness, tremors, seizures, speech difficulty, weakness, numbness and headaches  Psychiatric/Behavioral: Negative for agitation, confusion, hallucinations and sleep disturbance  The patient is not nervous/anxious      All other systems reviewed and are negative      Geriatric Conditions: Ambulatory dysfunction/ Fall/ urinary incontinence  Memory:  According to daughter recent memory issues  Mobility:  Uses walker and mobile   Falls:  Last fall 01/2019  Assistive Devices:  Walker/ cane  [employer]: Mildly Frail /5  Nutrition/weight loss/grocery shopping/meal preparation: with diuretics weight loss/ daughter helps   Vision impairment: no  Hearing impairment: no  Incontinence: yes  Delirium: no  Polypharmacy: yes/ on Methocarbamol/ Zoloft   Patients primary residence:Home  Lives with:daughter and family  iADL's: daughter helps/ can use phone  ADL's:  Takes care of her cleaning and hygine    Historical Information   Past Medical History:   Diagnosis Date    Arthritis     Breast cancer ([facility] ) 2015    Cardiac disease     aortic valve transplant    CHF (congestive heart failure) ([facility] )     Compression fracture of body of thoracic vertebra ([facility] )     CVA (cerebral vascular accident) ([facility] )     Diabetes mellitus ([facility] )     Disease of thyroid gland     Fibromyalgia, primary     Hyperlipidemia     Hypertension     Neuropathy     Pressure injury of skin     Renal disorder     kidney stent    Stroke [facility])      Past Surgical History:   Procedure Laterality Date    AORTIC VALVE SURGERY      BREAST LUMPECTOMY Right     BREAST LUMPECTOMY Left     BREAST SURGERY      lumpectomy for breast cancer    CATARACT EXTRACTION      CHOLECYSTECTOMY      HYSTERECTOMY  1978    KIDNEY SURGERY      stent placed for kidney    OTHER SURGICAL HISTORY      abdominal aneurysm surgery    [place] ARTHRODESIS POSTERIOR ATLAS-AXIS C1-C2 N/A 5/1/2019    Procedure: C1-C2 lateral mass fixation fusion with possible sublaminar cables;  Surgeon: Teresa Parra MD;  [facility];  Service: Neurosurgery    REPLACEMENT TOTAL KNEE      left      Social History   Social History     Substance and Sexual Activity   Alcohol Use Not Currently    Frequency: Never  Binge frequency: Never     Social History     Substance and Sexual Activity   Drug Use No     Social History     Tobacco Use   Smoking Status Never Smoker   Smokeless Tobacco Never Used         Family History:   Family History   Problem Relation Age of Onset    Heart disease Mother     Arthritis Mother     Diabetes Mother     Heart failure Father     Arthritis Father     Other Father         enlargement of prostate     Dementia Father    Patient  had Lewy body dementia and passed away last year       Meds/Allergies   Current meds:   Current Facility-Administered Medications   Medication Dose Route Frequency    docusate sodium (COLACE) capsule 100 mg  100 mg Oral BID    HYDROmorphone (DILAUDID) injection 0 2 mg  0 2 mg Intravenous Q3H PRN    levETIRAcetam (KEPPRA) tablet 500 mg  500 mg Oral BID    ondansetron (ZOFRAN) injection 4 mg  4 mg Intravenous Q6H PRN    oxyCODONE (ROXICODONE) IR tablet 2 5 mg  2 5 mg Oral Q4H PRN    oxyCODONE (ROXICODONE) IR tablet 5 mg  5 mg Oral Q4H PRN    polyethylene glycol (MIRALAX) packet 17 g  17 g Oral Daily    senna (SENOKOT) tablet 17 2 mg  2 tablet Oral Daily      Current PTA meds: from daughter  Medications Prior to Admission   Medication    clopidogrel (PLAVIX) 75 mg tablet po qd    levothyroxine 112 mcg tablet po qd    acetaminophen (TYLENOL) 325 mg tablet prn     albuterol (PROVENTIL HFA,VENTOLIN HFA) 90 mcg/act inhaler    amLODIPine (NORVASC) 5 mg tablet po qd    calcium carbonate-vitamin D (OSCAL-D) 500 mg-200 units per tablet    gabapentin (NEURONTIN) 300 mg capsule 2 po TID total 1800 mg     hydroxychloroquine (PLAQUENIL) 200 mg tablet po qd    lidocaine (LIDODERM) 5 %    methocarbamol (ROBAXIN) 500 mg tablet po qd    metoprolol tartrate (LOPRESSOR) 25 mg tablet po qd    Multiple Vitamins-Calcium (MULTI-DAY/CALCIUM/EXTRA IRON PO)    Multiple Vitamins-Minerals (CENTRUM ADULTS PO)    nitroglycerin (NITROSTAT) 0 4 mg SL tablet prn    nystatin (MYCOSTATIN) powder    Omega-3 Fatty Acids (FISH OIL) 1,000 mg    potassium chloride (K-DUR,KLOR-CON) 10 mEq tablet po qd    sertraline (ZOLOFT) 25 mg tablet po hs    torsemide (DEMADEX) 20 mg tablet po qd        Allergies   Allergen Reactions    Duloxetine Hcl Other (See Comments) and Hypertension    Duloxetine Hcl      Cymbalta; Hemorrhagic stroke listed as reaction    Escitalopram      Other reaction(s): Urinary Retention    Pregabalin      Other reaction(s): Hypertension    Statins Myalgia    Tramadol      Other reaction(s): Hypertension    Triprolidine-Pse Other (See Comments)    Anastrozole Abdominal Pain    Anastrozole     Antihistamines, Diphenhydramine-Type     Exemestane      Aromasin; Muscle pain & cramps    Lexapro [Escitalopram]      Urinary retention    Lyrica [Pregabalin]      hypertension    Metformin      diarrhea    Oxycodone     Statins      Muscle pain & cramps    Tramadol      hypertension    Triprolidine-Pseudoephedrine     Metformin Diarrhea       Objective   Vitals: Blood pressure 144/55, pulse 59, temperature 98 7 °F (37 1 °C), temperature source Oral, resp  rate 16, height 4' 11" (1 499 m), weight 84 1 kg (185 lb 6 5 oz), SpO2 95 %, not currently breastfeeding  ,Body mass index is 37 45 kg/m²  Physical Exam   Constitutional: She is oriented to person, place, and time  She appears well-developed and well-nourished  No distress  HENT:   Head: Normocephalic  Mouth/Throat: Oropharynx is clear and moist    Eyes: Pupils are equal, round, and reactive to light  Conjunctivae and EOM are normal  Right eye exhibits no discharge  Left eye exhibits no discharge  No scleral icterus  Neck: Normal range of motion  No JVD present  Discomfort ad pain with movement   Cardiovascular: Normal rate, regular rhythm and intact distal pulses  Murmur heard  Pulmonary/Chest: Effort normal and breath sounds normal  She has no wheezes  She has no rales  Abdominal: Soft  Bowel sounds are normal  She exhibits no distension  There is no tenderness  Musculoskeletal: She exhibits edema  She exhibits no tenderness or deformity  Neurological: She is alert and oriented to person, place, and time  No cranial nerve deficit or sensory deficit  Skin: Skin is warm and dry  Psychiatric: She has a normal mood and affect  Her behavior is normal    Nursing note and vitals reviewed  Lab Results:   Results from last 7 days   Lab Units 10/09/19  0436   WBC Thousand/uL 6 53   HEMOGLOBIN g/dL 9 3*   HEMATOCRIT % 30 0*   PLATELETS Thousands/uL 157        Results from last 7 days   Lab Units 10/08/19  2029   POTASSIUM mmol/L 3 4*   CHLORIDE mmol/L 104   CO2 mmol/L 31   BUN mg/dL 60*   CREATININE mg/dL 2 44*   CALCIUM mg/dL 9 8   Patient is K- Ethan     Imaging Studies: I have personally reviewed pertinent films in PACS   CT Brain: Tiny acute subdural hematoma at the left paramedian anterior falx  No intraparenchymal hemorrhage  Marked chronic small vessel ischemic changes and mild volume loss  No intracranial mass, mass effect or midline shift  No CT signs of acute infarction  No acute parenchymal hemorrhage  There is marked periventricular white matter low attenuation which is nonspecific and most likely related to chronic small vessel ischemic changes  There is an old left pontine lacunar infarct  There are old bilateral basal ganglia lacunar infarcts  Ct Spine: Postoperative changes of posterior cervical fusion at C1 and C2 for repair of a displaced odontoid fracture  There is stable 5 mm of anterior displacement of the odontoid process in relation to the body of C2  Moderate to marked 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 with multilevel anterior bridging osteophytes  There is a stable previously described nondisplaced fracture of an anterior bridging osteophyte at C6  EKG, Pathology, and Other Studies: I have personally reviewed pertinent reports      VTE Prophylaxis: Sequential compression device (Venodyne)     Code Status: Level 1 - Full Code

## 2019-10-09 NOTE — PHYSICAL THERAPY NOTE
PHYSICAL THERAPY EVALUATION  NAME:  Enoc Fletcher  DATE: 10/09/19    AGE:   80 y o  Mrn:   [id number]  ADMIT DX:  Acute on chronic diastolic heart failure ([facility]) [I50 33]  Diabetes ([place] [address] ) [E11 9]  Subdural hematoma, acute ([place] [address] ) [L85 2O8G]  Ambulatory dysfunction [R26 2]  Type 2 diabetes mellitus with diabetic polyneuropathy, without long-term current use of insulin ([facility]) [E11 42]  Stage 3 chronic kidney disease ([place] [address] ) [N18 3]  Unspecified multiple injuries, initial encounter [T07  XXXA]  Hypertension, unspecified type [I10]    Past Medical History:   Diagnosis Date    Arthritis     Breast cancer ([place] [address] ) 2015    Cardiac disease     aortic valve transplant    CHF (congestive heart failure) ([facility])     Compression fracture of body of thoracic vertebra ([facility])     CVA (cerebral vascular accident) ([place] [address] )     Diabetes mellitus ([place] [address] )     Disease of thyroid gland     Fibromyalgia, primary     Hyperlipidemia     Hypertension     Neuropathy     Pressure injury of skin     Renal disorder     kidney stent    Stroke [facility])        Past Surgical History:   Procedure Laterality Date    AORTIC VALVE SURGERY      BREAST LUMPECTOMY Right     BREAST LUMPECTOMY Left     BREAST SURGERY      lumpectomy for breast cancer    CATARACT EXTRACTION      CHOLECYSTECTOMY      HYSTERECTOMY  1978    KIDNEY SURGERY      stent placed for kidney    OTHER SURGICAL HISTORY      abdominal aneurysm surgery    [place] ARTHRODESIS POSTERIOR ATLAS-AXIS C1-C2 N/A 5/1/2019    Procedure: C1-C2 lateral mass fixation fusion with possible sublaminar cables;  Surgeon: Richard Sky MD;  [facility];  Service: Neurosurgery    REPLACEMENT TOTAL KNEE      left        Length Of Stay: 1    PHYSICAL THERAPY EVALUATION:        10/09/19 1201   Note Type   Note type Eval only   Pain Assessment   Pain Assessment 0-10   Pain Score 6   Pain Type Acute pain   Pain Location Back   Pain Descriptors Aching   Pain Frequency Constant/continuous   Pain Onset Ongoing   Clinical Progression Not changed   Effect of Pain on Daily Activities increased pain with activity    Patient's Stated Pain Goal No pain   Hospital Pain Intervention(s) Ambulation/increased activity;Repositioned   Response to Interventions tolerated    Home Living   Type of [address] [facility] Multi-level;Stairs to enter with rails; Able to live on main level with bedroom/bathroom  (1 ISRA, first floor setup )   [address] [facility]   Additional Comments Pt reports living with daughter and her family who are able to assist pt if needed  Pt also reports having HHA who assist her with cleaning at home    Prior Function   Level of [place] Independent with ADLs and functional mobility   Lives With [facility] Help From Family   ADL Assistance Independent   Falls in the last 6 months 1 to 4   Vocational Retired   Comments Pt reports the use of a RW for ambulation PTA    Restrictions/Precautions   Weight Bearing Precautions Per Order No   Other Precautions Cognitive; Fall Risk;Pain   General   Family/Caregiver Present No   Cognition   Overall Cognitive Status Impaired   Arousal/Participation Alert   Attention Attends with cues to redirect   Orientation Level Oriented X4   Memory Decreased short term memory   Following Commands Follows one step commands without difficulty   RUE Assessment   RUE Assessment WFL   LUE Assessment   LUE Assessment WFL   RLE Assessment   RLE Assessment WFL   Strength RLE   RLE Overall Strength 4-/5   LLE Assessment   LLE Assessment WFL   Strength LLE   LLE Overall Strength 4-/5   Bed Mobility   Additional Comments NA, Pt seated OOB in chair at time of PT eval    Transfers   Sit to Stand 4  Minimal assistance   Additional items Assist x 1; Increased time required;Verbal cues   Stand to Sit 4  Minimal assistance   Additional items Assist x 1; Increased time required;Verbal cues   Additional Comments VC and TC needed for hand placement and safety    Ambulation/Elevation   Gait pattern Excessively slow; Short stride; Foward flexed; Inconsistent cande   Gait Assistance 4  Minimal assist   Additional items Assist x 1   Assistive Device Rolling walker   Distance 30ft   (limited by fatigue )   Balance   Static Sitting Fair   Static Standing Fair -   Ambulatory Poor +   Endurance Deficit   Endurance Deficit Yes   Endurance Deficit Description fatigue    Activity Tolerance   Activity Tolerance Patient limited by fatigue   Nurse Made Aware Pt appropriate to be seen and mobilize per nsg    Assessment   Prognosis Good   Problem List Decreased strength;Decreased endurance; Impaired balance;Decreased mobility; Decreased safety awareness;Decreased cognition;Pain   Assessment Pt is 80 y o  female seen for PT evaluation s/p admit to [facility] on 10/8/2019  Two pt identifiers were used to confirm  Pt presented s/p fall  Pt was admitted with a primary dx of: traumatic subdural hematoma  Pt was transferred from [facility] to [address] [place] [address] for further medical management   PT now consulted for assessment of mobility and d/c needs  Pt with Up in chair orders  Pts current co morbidities effecting treatment include: arthritis, breast cancer, cardiac disease, CHF, hx of CVA, DM, fibromyalgia, HTN, HLD, neuropathy, and personal factors including ISRA home  Pts current clinical presentation is Unstable/ Unpredictable (high complexity) due to Ongoing medical management for primary dx, Increased reliance on more restrictive AD compared to baseline, Decreased activity tolerance compared to baseline, Fall risk, Increased assistance needed from caregiver at current time, Cog status, Continuous pulse oximetry monitoring     Prior to admission, pt was I with ambulation without the use of an AD as per pt  Upon evaluation, pt currently is requiring ; min A for transfers and min A for ambulation w/ RW   Pt denies any lightheadedness or dizziness with ambulation   Pt presents at PT eval functioning below baseline and currently w/ overall mobility deficits 2* to: BLE weakness, impaired balance, decreased endurance, gait deviations, pain, decreased activity tolerance compared to baseline, decreased safety awareness, fall risk, decreased cognition  Pt currently at a fall risk 2* to impairments listed above  Based on the aforementioned PT evaluation, pt will continue to benefit from skilled Acute PT interventions to address stated impairments; to maximize functional mobility; for ongoing pt/ family training; and DME needs  At conclusion of PT session pt returned back in chair with phone and call bell within reach  Pt denies any further questions at this time  PT is currently recommending rehab, daughter wishes for pt to return home, if family continues to refuse rehab and pt is to return home, recommend 24/7 assistance and home PT  Pt/ family agreeable to plan and goals as stated on evaluation  PT will continue to follow during hospital stay  Goals   Patient Goals " to go home"   STG Expiration Date 10/19/19   Short Term Goal #1 In 10 days pt will complete: 1) Bed mobility skills with S to increase safety and independence as well as decrease caregiver burden  2) Functional transfers with S to promote increased independence, safety, and QOL in the home environment  3) Ambulate 150' using least restrictive AD with S without LOB and stable vitals so that pt can negotiate home environment safely and promote independence with functional mobility and return to PLOF  4) Stair training up/ down 1 step/s using rail/s with S so that pt can enter/negotiate home environment safely and decrease fall risk  5) Improve balance grades to Good to increase safety with all mobility and decrease fall risk  6) Improve BLE strength by 1/2 grade to help increase overall functional mobility and decrease fall risk  7) PT for ongoing pt and family education; DME needs and D/C planning to promote highest level of function in least restrictive environment  Plan   Treatment/Interventions Functional transfer training;LE strengthening/ROM; Elevations; Therapeutic exercise; Endurance training;Patient/family training;Equipment eval/education; Bed mobility;Gait training;Spoke to nursing;Family;Spoke to advanced practitioner   PT Frequency Other (Comment)  (3-6x a week )   Recommendation   Recommendation Short-term skilled PT   Equipment Recommended Walker  (RW)   PT - OK to Discharge Yes  (to rehab when medically cleared )   Modified Rosy Scale   Modified [place] Scale 4   Barthel Index   Feeding 10   Bathing 0   Grooming Score 0   Dressing Score 5   Bladder Score 5   Bowels Score 5   Toilet Use Score 5   Transfers (Bed/Chair) Score 5   Mobility (Level Surface) Score 0   Stairs Score 0   Barthel Index Score 35   Loreatha Headings, PT

## 2019-10-09 NOTE — PLAN OF CARE
Problem: PHYSICAL THERAPY ADULT  Goal: Performs mobility at highest level of function for planned discharge setting  See evaluation for individualized goals  Description  Treatment/Interventions: Functional transfer training, LE strengthening/ROM, Elevations, Therapeutic exercise, Endurance training, Patient/family training, Equipment eval/education, Bed mobility, Gait training, Spoke to nursing, Family, Spoke to advanced practitioner  Equipment Recommended: David Gann)       See flowsheet documentation for full assessment, interventions and recommendations  Note:   Prognosis: Good  Problem List: Decreased strength, Decreased endurance, Impaired balance, Decreased mobility, Decreased safety awareness, Decreased cognition, Pain  Assessment: Pt is 80 y o  female seen for PT evaluation s/p admit to [facility] on 10/8/2019  Two pt identifiers were used to confirm  Pt presented s/p fall  Pt was admitted with a primary dx of: traumatic subdural hematoma  Pt was [facility] for further medical management   PT now consulted for assessment of mobility and d/c needs  Pt with Up in chair orders  Pts current co morbidities effecting treatment include: arthritis, breast cancer, cardiac disease, CHF, hx of CVA, DM, fibromyalgia, HTN, HLD, neuropathy, and personal factors including ISRA home  Pts current clinical presentation is Unstable/ Unpredictable (high complexity) due to Ongoing medical management for primary dx, Increased reliance on more restrictive AD compared to baseline, Decreased activity tolerance compared to baseline, Fall risk, Increased assistance needed from caregiver at current time, Cog status, Continuous pulse oximetry monitoring     Prior to admission, pt was I with ambulation without the use of an AD as per pt  Upon evaluation, pt currently is requiring ; min A for transfers and min A for ambulation w/ RW   Pt denies any lightheadedness or dizziness with ambulation   Pt presents at PT eval functioning below baseline and currently w/ overall mobility deficits 2* to: BLE weakness, impaired balance, decreased endurance, gait deviations, pain, decreased activity tolerance compared to baseline, decreased safety awareness, fall risk, decreased cognition  Pt currently at a fall risk 2* to impairments listed above  Based on the aforementioned PT evaluation, pt will continue to benefit from skilled Acute PT interventions to address stated impairments; to maximize functional mobility; for ongoing pt/ family training; and DME needs  At conclusion of PT session pt returned back in chair with phone and call bell within reach  Pt denies any further questions at this time  PT is currently recommending rehab, daughter wishes for pt to return home, if family continues to refuse rehab and pt is to return home, recommend 24/7 assistance and home PT  Pt/ family agreeable to plan and goals as stated on evaluation  PT will continue to follow during hospital stay  Recommendation: Short-term skilled PT     PT - OK to Discharge: Yes(to rehab when medically cleared )    See flowsheet documentation for full assessment

## 2019-10-09 NOTE — ASSESSMENT & PLAN NOTE
· Patient sustained a mechanical fall  · CT head:  A tiny acute subdural hematoma at the left paramedian anterior falx  No intraparenchymal hemorrhage  Marked chronic small-vessel ischemic changes and mild volume loss  · Neurosurgery following, no surgical intervention at this time  Repeat CT head pending today  · Management per primary team, trauma  · Patient is on Plavix given history of prior stroke, this is on hold currently    Recommend hold for 2 weeks  · Continue Keppra per Trauma and Neurosurgery

## 2019-10-09 NOTE — PROGRESS NOTES
Progress Note - Steff Ojeda 1934, 80 y o  female MRN: [id number]    Unit/Bed#: [facility] 612-01 Encounter: [id number]    Primary Care Provider: Carri Etienne DO   Date and time admitted to hospital: 10/8/2019  5:54 PM        Ambulatory dysfunction  Assessment & Plan  - Ambulatory dysfunction with fall leading to below noted injuries  - Fall precautions   - PT and OT evaluation and treatment as indicated  - Case management consult for disposition planning  * Traumatic subdural hematoma without loss of consciousness (HCC)  Assessment & Plan  - Small traumatic subdural hematoma without neurologic deficit  - Appreciate Neurosurgery evaluation and recommendations  Non operative management recommended  - Await repeat CT scan of the head later today  - Monitor neurologic exam   - PT and OT evaluation and treatment as indicated  - Manage of any associated symptoms such as headaches with Tylenol and/or as indicated  Acute kidney injury [facility])  Assessment & Plan  - Acute kidney injury likely secondary to dehydration and diuretic use  - Await evaluation recommendations from Internal Medicine, and consider consultation to Nephrology if there is failure to improve  - Obtain UA with microscopic results, but no reflex to culture  - Monitor urine output and renal function with daily BMP  - Set hold parameters for blood pressure medication for systolic blood pressure of 130 mmHg  - Avoid nephrotoxic medications  Will hold torsemide and Plaquenil for now  May also consider holding gabapentin  Stage 3 chronic kidney disease (HCC)  Assessment & Plan  - Stage 3 chronic kidney disease with baseline creatinine appearing to be between 1 5-1 6   - Management of acute kidney injury as noted  - Will need close outpatient follow up with both primary care provider and Cardiology    May also need outpatient follow up with nephrologist     Essential hypertension  Assessment & Plan  - Chronic history of essential hypertension   - Continue home medication regimen, Norvasc, but adjust hold parameter for systolic blood pressure of 130 mmHg in setting of acute kidney injury  Chronic diastolic heart failure (HCC)  Assessment & Plan  Wt Readings from Last 3 Encounters:   10/08/19 84 1 kg (185 lb 6 5 oz)   10/08/19 84 1 kg (185 lb 6 5 oz)   09/24/19 86 6 kg (191 lb)     - History of chronic diastolic heart failure with acute exacerbation earlier this year  - Followed by [facility], Dr Zakia Latif  Per daughter, the patient was recently started on a diuretic for significant volume overload  Due to acute kidney injury and suspected dehydration, diuretic therapy to be held at this time  - Check daily weights and closely monitor creatinine   - Will likely need some chronic diuretic therapy once acute kidney injury is resolved  - Await Internal Medicine evaluation and recommendations  - Close follow-up with Cardiology as an outpatient  Bedside nurse rounds completed with nurse Blackwood  Prophylaxis: Sequential compression device (Venodyne)     Disposition:  Not medically appropriate for discharge due to acute on chronic kidney injury and further workup for traumatic brain injury  Anticipate patient will be cleared from her traumatic injuries following repeat CT scan of the head later today  Patient will require additional workup and management for her chronic medical comorbidities and acute kidney injury, present on admission  Continue PT and OT evaluation and treatment as indicated  Case Management following for disposition planning  Code status:  Level 1 - Full Code    Consultants:     Hayley Beltran 73 Internal Medicine  [facility] Neurosurgery  3  Geriatric Medicine  4  Inpatient Wound Care  Is the patient 72 years or older?: YES:    1  Before the illness or injury that brought you to the Emergency, did you need someone to help you on a regular basis? 0=No   2   Since the illness or injury that brought you to the Emergency, have you needed more help than usual to take care of yourself? 1=Yes   3  Have you been hospitalized for one or more nights during the past 6 months (excluding a stay in the Emergency Department)? 1=Yes   4  In general, do you see well? 0=Yes   5  In general, do you have serious problems with your memory? 0=No   6  Do you take more than three different medications everyday? 1=Yes   TOTAL   3     Did you order a geriatric consult if the score was 2 or greater?: yes    SUBJECTIVE:     [facility]  Outside Films Received: yes  Tertiary Exam Due on: 10/9/2019    Mechanism of Injury:Fall    Details related to Injury: +LOC:  no    Chief Complaint:  My back hurts      HPI/Last 24 hour events:  Patient is feeling okay today  She notes that she has intermittent sharp spasm type pains in the mid right back that she had up to her neck  These pains typically come with movement in and out of bed  She is comfortable at rest at the time of my evaluation  Aside from the pain noted above, she denies having pain elsewhere  She denies headaches, blurry vision, dizziness or lightheadedness, nausea or vomiting  She denies any chest or abdominal pain, or pain in her extremities  She is tolerating a diet without nausea or vomiting      Active medications:           Current Facility-Administered Medications:     acetaminophen (TYLENOL) tablet 975 mg, 975 mg, Oral, Q8H KARINE    amLODIPine (NORVASC) tablet 5 mg, 5 mg, Oral, QAM    calcium carbonate-vitamin D (OSCAL-D) 500 mg-200 units per tablet 1 tablet, 1 tablet, Oral, BID With Meals    docusate sodium (COLACE) capsule 100 mg, 100 mg, Oral, BID, 100 mg at 10/09/19 0834    gabapentin (NEURONTIN) capsule 300 mg, 300 mg, Oral, HS    HYDROmorphone (DILAUDID) injection 0 2 mg, 0 2 mg, Intravenous, Q3H PRN    levETIRAcetam (KEPPRA) tablet 500 mg, 500 mg, Oral, BID, 500 mg at 10/09/19 0834    [START ON 10/10/2019] levothyroxine tablet 112 mcg, 112 mcg, Oral, Early Morning    lidocaine (LIDODERM) 5 % patch 1 patch, 1 patch, Topical, Daily    methocarbamol (ROBAXIN) tablet 500 mg, 500 mg, Oral, TID PRN    multivitamin-minerals (CENTRUM ADULTS) tablet 1 tablet, 1 tablet, Oral, Daily    ondansetron (ZOFRAN) injection 4 mg, 4 mg, Intravenous, Q6H PRN    oxyCODONE (ROXICODONE) IR tablet 2 5 mg, 2 5 mg, Oral, Q4H PRN    oxyCODONE (ROXICODONE) IR tablet 5 mg, 5 mg, Oral, Q4H PRN, 5 mg at 10/09/19 1217    polyethylene glycol (MIRALAX) packet 17 g, 17 g, Oral, Daily    senna (SENOKOT) tablet 17 2 mg, 2 tablet, Oral, Daily    sertraline (ZOLOFT) tablet 25 mg, 25 mg, Oral, HS      OBJECTIVE:     Vitals:   Vitals:    10/09/19 1126   BP: 122/62   Pulse: 59   Resp: 14   Temp: 97 6 °F (36 4 °C)   SpO2: 96%       Physical Exam:   GENERAL APPEARANCE: Patient in no acute distress  HEENT: NCAT; PERRL, EOMs intact; Mucous membranes moist  NECK / BACK:  Mild tenderness over the right mid lateral back without external evidence of trauma or deformity; there was no tenderness over the cervical, thoracic or lumbar spine in the midline  CV: Regular rate and rhythm; + S1, S2; no murmur/gallops/rubs appreciated  CHEST / LUNGS: Clear to auscultation; no wheezes/rales/rhonci; mild right anterolateral chest wall tenderness without crepitus or deformity or external evidence of trauma  ABD: NABS; soft; non-distended; non-tender  EXT: +2 pulses bilaterally upper & lower extremities; no clubbing/cyanosis; moving all 4 extremities equally with no pain on range of motion and no tenderness; there was mild nonpitting edema of the bilateral lower extremities  NEURO: GCS 15; no focal neurologic deficits; neurovascularly intact  SKIN: Warm, dry and well perfused; no rash; no jaundice        I/O:   I/O       10/07 0701 - 10/08 0700 10/08 0701 - 10/09 0700 10/09 0701 - 10/10 0700    IV Piggyback  50     Total Intake(mL/kg)  50 (0 6)     Urine (mL/kg/hr) 250     Total Output  250     Net  -200                  Invasive Devices: Invasive Devices     Peripheral Intravenous Line            Peripheral IV 10/08/19 Right Forearm less than 1 day          Drain            External Urinary Catheter less than 1 day                  Imaging:   Ct Head Without Contrast    Result Date: 10/8/2019  Impression: Tiny acute subdural hematoma at the left paramedian anterior falx   No intraparenchymal hemorrhage  Marked chronic small vessel ischemic changes and mild volume loss  I personally discussed this study with Mitchell Dickey on 10/8/2019 at 4:22 PM  Workstation performed: [id number]     Ct Cervical Spine Without Contrast    Result Date: 10/8/2019  Impression: No acute cervical spine fracture or subluxation  Postoperative changes of posterior cervical fusion at C1 and C2 for repair of a displaced odontoid fracture  There is stable 5 mm of anterior displacement of the odontoid process in relation to the body of C2  Moderate to marked 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 with multilevel anterior bridging osteophytes  There is a stable previously described nondisplaced fracture of an anterior bridging osteophyte at C6    Workstation performed: [id number]       Labs:   CBC:   Lab Results   Component Value Date    WBC 6 53 10/09/2019    HGB 9 3 (L) 10/09/2019    HCT 30 0 (L) 10/09/2019     (H) 10/09/2019     10/09/2019    MCH 32 2 10/09/2019    MCHC 31 0 (L) 10/09/2019    RDW 14 1 10/09/2019    MPV 9 7 10/09/2019    NRBC 0 10/08/2019     CMP:   Lab Results   Component Value Date     10/08/2019    CO2 31 10/08/2019    BUN 60 (H) 10/08/2019    CREATININE 2 44 (H) 10/08/2019    CALCIUM 9 8 10/08/2019    EGFR 18 10/08/2019     Coagulation:   Lab Results   Component Value Date    INR 1 05 10/08/2019         Siva Rivero PA-C  10/9/2019 10:56 AM

## 2019-10-09 NOTE — PLAN OF CARE
Problem: OCCUPATIONAL THERAPY ADULT  Goal: Performs self-care activities at highest level of function for planned discharge setting  See evaluation for individualized goals  Description  Treatment Interventions: ADL retraining, Functional transfer training, Endurance training, Cognitive reorientation, Patient/family training, Equipment evaluation/education, Compensatory technique education, Energy conservation, Activityengagement          See flowsheet documentation for full assessment, interventions and recommendations  Note:   Limitation: Decreased ADL status, Decreased Safe judgement during ADL, Decreased cognition, Decreased endurance, Decreased self-care trans, Decreased high-level ADLs  Prognosis: Good, Fair  Assessment: 81 YO Female SEEN FOR INITIAL OCCUPATIONAL THERAPY EVALUATION FOLLOWING TXF FROM SL[facility]->SLB S/P FALL WITH +HEADSTRIKE, -LOC  PT FOUND TO ACUTE ACUTE SUBDURAL HEMATOMA  PROBLEMS LIST INCLUDES H/O CVA, CHF, HTN, HLD, CKD, DM, FIBROMYALGIA AND H/O FALLS WITH NECK INJURY, CURRENTLY RECEIVING OUTPT THERAPY SERVICES  PT'S DAUGHTER PRESENT TO ASSIST WITH ANSWERING QUESTIONS  PT LIVE SON THE 1ST FLOOR OF FAMILY'S HOME WITH DAUGHTER AND DAUGHTER'S FAMILY LIVING UPSTAIRS  PT REPORT BEING OVERALL INDEPENDENT WITH ADLS WITH SUPERVISION WITH BATHING AND HAS ASSIST AS NEEDED WITH IADLS, INCLUDING SET-UP WITH MEDIATIONS  PT CURRENTLY REQUIRES OVERALL MOD-MAX A WITH ADLS, AND MIN A WITH TRANSFERS / FUNCTIONAL MOBILITY WITH USE OF RW  PT IS LIMITED 2' PAIN, FATIGUE, IMPAIRED BALANCE, FALL RISK , LIMITED INSIGHT INTO DEFICITS, OVERALL WEAKNESS/DECONDITIONING  and OVERALL LIMITED ACTIVITY TOLERANCE  PT'S DAUGHTER REPORTS PT WITH MILD INCREASED "WHIFTINESS" FOLLOWING FALL  PT EDUCATED ON DEEP BREATHING TECHNIQUES T/O ACTIVITY, SLOWING OF PACE, INCREASED FAMILY SUPPORT and CONTINUE PARTICIPATION IN SELF-CARE/MOBILITY WITH STAFF Corinna W Be Uribe    FROM AN OCCUPATIONAL THERAPY PERSPECTIVE, PT WOULD BENEFIT FROM ADDITIONAL OT SERVICES IN AN INPT REHAB SETTING UPON D/C  DAUGHTER REPORTS SHE WISHES FOR PT TO RETURN DIRECTLY HOME- IF FAMILY CONTS TO REFUSE REHAB, RECOMMEND INCREASED HHA AT ALL TIMES + HOME THERAPY  WILL CONT TO FOLLOW TO ADDRESS THE BELOW DESCRIBED GOALS  OT Discharge Recommendation: Short Term Rehab  OT - OK to Discharge:  Yes

## 2019-10-09 NOTE — CONSULTS
Consult Note - Wound   Sammuel Grills 80 y o  female MRN: [id number]  [facility] Encounter: [id number]        History and Present Illness:  Patient is an 80year old female transferred to [address] [place] [address] where she presented s/p fall  She is awake, alert and oriented, out of bed in chair in company of granddaughter, patient with no complaints, only concerns to be able to finishing her meal un-interrupted  Patient able to stand for assessment, granddaughter also assessing skin with wound care nurse, she helps with care at home and familiar with skin condition on daily basis  BMI: Estimated body mass index is 37 45 kg/m² as calculated from the following:    Height as of this encounter: 4' 11" (1 499 m)  Weight as of this encounter: 84 1 kg (185 lb 6 5 oz)      History  Past Medical History:   Diagnosis Date    Arthritis     Breast cancer ([facility] ) 2015    Cardiac disease     aortic valve transplant    [facility]est[facility] failure[facility]     Compression fracture of body of thoracic vertebra ([facility])     CVA (cerebral vascular accident) ([facility] )     Diabetes mellitus ([facility] )     Disease of thyroid gland     Fibromyalgia, primary     Hyperlipidemia     Hypertension     Neuropathy     Pressure injury of skin     Renal disorder     kidney stent    Stroke [facility])      Past Surgical History:   Procedure Laterality Date    AORTIC VALVE SURGERY      BREAST LUMPECTOMY Right     BREAST LUMPECTOMY Left     BREAST SURGERY      lumpectomy for breast cancer    CATARACT EXTRACTION      CHOLECYSTECTOMY      HYSTERECTOMY  1978    KIDNEY SURGERY      stent placed for kidney    OTHER SURGICAL HISTORY      abdominal aneurysm surgery    CO ARTHRODESIS POSTERIOR ATLAS-AXIS C1-C2 N/A 5/1/2019    Procedure: C1-C2 lateral mass fixation fusion with possible sublaminar cables;  Surgeon: Vanessa Benavides MD;  [facility]: [facility];  Service: Neurosurgery    REPLACEMENT TOTAL KNEE      left        Problem List: Patient Active Problem List   Diagnosis    TIA (transient ischemic attack)    UTI (urinary tract infection)    Hypertension    Diabetes ([facility] )    Hypothyroidism    HX: breast cancer    H/O: CVA (cerebrovascular accident)    Osteoporosis    Arthritis    Fibromyalgia    Bilateral malignant neoplasm of breast in female, estrogen receptor positive ([facility] )    Closed nondisplaced fracture of second cervical vertebra (HCC)    Neck pain, acute    Type III fracture of odontoid process ([facility] )    C6 cervical fracture (HCC)    Essential hypertension    Hypothyroidism    Fall    Ambulatory dysfunction    Acute pain    Renovascular hypertension    Chronic diastolic heart failure (HCC)    History of CVA (cerebrovascular accident)    Type 2 diabetes mellitus with diabetic polyneuropathy (HCC)    Depression    Stage 3 chronic kidney disease (HCC)    Rheumatoid arthritis involving multiple sites ([facility] )    Fibromyalgia    History of aortic valve replacement with bioprosthetic valve    Renal artery stenosis (HCC)    Parenchymal renal hypertension    Pain    Lesion of left lung    Trigger point    Dermatitis associated with moisture    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    Elevated troponin    Acute respiratory failure with hypoxia (HCC)    Acute kidney injury (HCC)    Dysphagia    Traumatic subdural hematoma without loss of consciousness (HCC)       Medications:  Current Facility-Administered Medications   Medication Dose Route Frequency Provider Last Rate Last Dose    acetaminophen (TYLENOL) tablet 975 mg  975 mg Oral [facility] Mook Holly PA-C   975 mg at 10/09/19 1406    amLODIPine (NORVASC) tablet 5 mg  5 mg Oral QA Mook Holly PA-C        calcium carbonate-vitamin D (OSCAL-D) 500 mg-200 units per tablet 1 tablet  1 tablet Oral BID With Meals Allyn Ho DO        docusate sodium (COLACE) capsule 100 mg  100 mg Oral BID Arvis Stagger, DO   100 mg at 10/09/19 4359    gabapentin (NEURONTIN) capsule 300 mg  300 mg Oral HS Arvis Stagger, DO        HYDROmorphone (DILAUDID) injection 0 2 mg  0 2 mg Intravenous Q3H PRN Arvis Stagger, DO        levETIRAcetam (KEPPRA) tablet 500 mg  500 mg Oral BID Arvis Stagger, DO   500 mg at 10/09/19 0834    [START ON 10/10/2019] levothyroxine tablet 112 mcg  112 mcg Oral Early Morning Arvis Stagger, DO        lidocaine (LIDODERM) 5 % patch 1 patch  1 patch Topical Daily Arvis Stagger, DO   1 patch at 10/09/19 1400    methocarbamol (ROBAXIN) tablet 500 mg  500 mg Oral TID PRN Arvis Stagger, DO   500 mg at 10/09/19 1400    multivitamin-minerals (CENTRUM ADULTS) tablet 1 tablet  1 tablet Oral Daily Arvis Stagger, DO   1 tablet at 10/09/19 1400    ondansetron (ZOFRAN) injection 4 mg  4 mg Intravenous Q6H PRN Arvis Stagger, DO        oxyCODONE (ROXICODONE) IR tablet 2 5 mg  2 5 mg Oral Q4H PRN Arvis Stagger, DO        oxyCODONE (ROXICODONE) IR tablet 5 mg  5 mg Oral Q4H PRN Arvis Stagger, DO   5 mg at 10/09/19 1217    polyethylene glycol (MIRALAX) packet 17 g  17 g Oral Daily Arvis Stagger, DO        senna (SENOKOT) tablet 17 2 mg  2 tablet Oral Daily Arvis Stagger, DO        sertraline (ZOLOFT) tablet 25 mg  25 mg Oral HS Arvis Stagger, DO           Assessment Findings:   1-Mid collins cleft with fine linear skin splitting secondary to MASD, almost already fully closed  Mild blanchable sacral erythema and maceration noted  No pressure injury or candidiasis seen to skin folds, sacrum, perineum or labia  Bilateral labia with dry bernadine and blanchable purple skin discoloration consistent with skin damage/changes secondary to chronic friction and moisture  Patient is incontinent, she reports wearing depends on daily basis due to urinary accidents at times      Skin care plans:  1-Apply Hydraguard mixed with Calazime to sacrum, buttocks, labia BID and PRN  2-Hydraguard to bilateral heel BID and PRN  3-Elevate heels to offload pressure  4-Ehob cushion when out of bed  5-Turn/repoisiton q2h or when medically stable for pressure re-distribution on skin  6-Moisturize skin daily with skin nourishing cream      Wounds:  Wound 08/16/19 Moisture associated skin damage Sacrum Medial (Active)   Wound Description Pink 10/9/2019 12:17 PM   Wound Length (cm) 2 cm 10/9/2019 12:17 PM   Wound Width (cm) 0 1 cm 10/9/2019 12:17 PM   Wound Depth (cm) 0 01 10/9/2019 12:17 PM   Calculated Wound Area (cm^2) 0 2 cm^2 10/9/2019 12:17 PM   Calculated Wound Volume (cm^3) 0 cm^3 10/9/2019 12:17 PM   Patient Tolerance Tolerated well 10/9/2019 12:17 PM       Our skin care recommendations are placed as nursing orders, wound care is signing off, please call ext [phone] or [phone] with questions or concerns          Gomez Husain RN, BSN, [facility]

## 2019-10-09 NOTE — ASSESSMENT & PLAN NOTE
- Small traumatic subdural hematoma without neurologic deficit  - Appreciate Neurosurgery evaluation and recommendations  Non operative management recommended  - Await repeat CT scan of the head later today  - Monitor neurologic exam   - PT and OT evaluation and treatment as indicated  - Manage of any associated symptoms such as headaches with Tylenol and/or as indicated

## 2019-10-09 NOTE — ASSESSMENT & PLAN NOTE
Wt Readings from Last 3 Encounters:   10/08/19 84 1 kg (185 lb 6 5 oz)   10/08/19 84 1 kg (185 lb 6 5 oz)   09/24/19 86 6 kg (191 lb)     · Continue metoprolol   · Torsemide on hold currently given acute kidney injury  · Monitor daily weight, I&O  · Follow-up with outpatient cardiologist

## 2019-10-09 NOTE — ASSESSMENT & PLAN NOTE
SDH along left anterior falx s/p fall in doorway at [facility]  · Patient denies LOC  · Currently c/o slight headache and neck pain, denies any focal neuro deficits  · Takes plavix for h/o multiple strokes, most recently 2016   DDAVP given in ED    Imaging:  · CTH w/o, 10/8/19: Tiny acute subdural hematoma at the left paramedian anterior falx  No intraparenchymal hemorrhage  · CT cervical spine w/o, 10/8/19: No acute spine fracture or subluxation  Hardware intact from C1-C2 posterior fusion for displaced odontoid fx  · Xray cervical spine, 10/8/19: Hardware intact, no evidence of failure or broken hardware from C1-C2 fusion    Plan:  · Repeat CTH this afternoon (24h after first [facility])  · Keppra can be prescribed per primary team  · Hold plavix and all other blood thinning medications for 2 weeks  · DVT ppx: SCDs, hold pharm ppx in setting of acute SDH  · Pain control per primary team  · No neurosurgical intervention is anticipated at this time    Neurosurgery will sign off and write treatment plan after repeat CTH is obtained  Please do not hesitate to call with any additional questions  If there is no change, the patient will follow up as outpatient in 2 weeks with repeat CTH

## 2019-10-09 NOTE — ASSESSMENT & PLAN NOTE
- Ambulatory dysfunction with fall leading to below noted injuries  - Fall precautions   - PT and OT evaluation and treatment as indicated  - Case management consult for disposition planning

## 2019-10-09 NOTE — RESPIRATORY THERAPY NOTE
RT Protocol Note  Eliana Campo 80 y o  female MRN: [id number]  [address] Encounter: [id number]    Assessment    Principal Problem:    Traumatic subdural hematoma without loss of consciousness ([place] [address] )  Active Problems:    Essential hypertension    Ambulatory dysfunction    Chronic diastolic heart failure (HCC)    Stage 3 chronic kidney disease (HCC)    Acute kidney injury ([facility] )      Home Pulmonary Medications:  Albuterol PRN       Past Medical History:   Diagnosis Date    Arthritis     Breast cancer ([place] [address] ) 2015    Cardiac disease     aortic valve transplant    [facility]congest[facility]) [facility]     Compression fracture of body of thoracic vertebra ([facility])     CVA (cerebral vascular accident) ([place] [address] )     Diabetes mellitus ([place] [address] )     Disease of thyroid gland     Fibromyalgia, primary     Hyperlipidemia     Hypertension     Neuropathy     Pressure injury of skin     Renal disorder     kidney stent    Stroke [facility])      Social History     Socioeconomic History    Marital status:       Spouse name: None    Number of children: 3    Years of education: None    Highest education level: None   Occupational History    None   Social Needs    Financial resource strain: None    Food insecurity:     Worry: None     Inability: None    Transportation needs:     Medical: None     Non-medical: None   Tobacco Use    Smoking status: Never Smoker    Smokeless tobacco: Never Used   Substance and Sexual Activity    Alcohol use: Not Currently     Frequency: Never     Binge frequency: Never    Drug use: No    Sexual activity: Not Currently   Lifestyle    Physical activity:     Days per week: None     Minutes per session: None    Stress: None   Relationships    Social connections:     Talks on phone: None     Gets together: None     Attends Sikhism service: None     Active member of club or organization: None     Attends meetings of clubs or organizations: None     Relationship status: None    Intimate partner violence:     Fear of current or ex partner: None     Emotionally abused: None     Physically abused: None     Forced sexual activity: None   Other Topics Concern    None   Social History Narrative    ** Merged History Encounter **            Subjective         Objective    Physical Exam:   Assessment Type: Assess only  General Appearance: Alert, Awake  Respiratory Pattern: Normal  Chest Assessment: Chest expansion symmetrical  Bilateral Breath Sounds: Clear, Diminished  Cough: None    Vitals:  Blood pressure 122/62, pulse 60, temperature 97 6 °F (36 4 °C), temperature source Oral, resp  rate 14, height 4' 11" (1 499 m), weight 84 1 kg (185 lb 6 5 oz), SpO2 96 %, not currently breastfeeding  Imaging and other studies: I have personally reviewed pertinent reports  Plan    Respiratory Plan: No distress/Pulmonary history        Resp Comments: Pt admitted for subdural hematoma  Pt uses albuterol inhaler PRN at home on occasion  Will order albuterol inhaler PRN

## 2019-10-10 ENCOUNTER — APPOINTMENT (OUTPATIENT)
Dept: PHYSICAL THERAPY | Age: 84
End: 2019-10-10
Payer: COMMERCIAL

## 2019-10-10 VITALS
BODY MASS INDEX: 37.48 KG/M2 | DIASTOLIC BLOOD PRESSURE: 52 MMHG | RESPIRATION RATE: 18 BRPM | OXYGEN SATURATION: 94 % | TEMPERATURE: 97.4 F | WEIGHT: 185.9 LBS | HEART RATE: 56 BPM | SYSTOLIC BLOOD PRESSURE: 97 MMHG | HEIGHT: 59 IN

## 2019-10-10 LAB
ANION GAP SERPL CALCULATED.3IONS-SCNC: 8 MMOL/L (ref 4–13)
BUN SERPL-MCNC: 51 MG/DL (ref 5–25)
CALCIUM SERPL-MCNC: 10.3 MG/DL (ref 8.3–10.1)
CHLORIDE SERPL-SCNC: 97 MMOL/L (ref 100–108)
CO2 SERPL-SCNC: 31 MMOL/L (ref 21–32)
CREAT SERPL-MCNC: 1.87 MG/DL (ref 0.6–1.3)
ERYTHROCYTE [DISTWIDTH] IN BLOOD BY AUTOMATED COUNT: 14.1 % (ref 11.6–15.1)
GFR SERPL CREATININE-BSD FRML MDRD: 24 ML/MIN/1.73SQ M
GLUCOSE SERPL-MCNC: 94 MG/DL (ref 65–140)
HCT VFR BLD AUTO: 29.4 % (ref 34.8–46.1)
HGB BLD-MCNC: 9.4 G/DL (ref 11.5–15.4)
MCH RBC QN AUTO: 33.3 PG (ref 26.8–34.3)
MCHC RBC AUTO-ENTMCNC: 32 G/DL (ref 31.4–37.4)
MCV RBC AUTO: 104 FL (ref 82–98)
PLATELET # BLD AUTO: 167 THOUSANDS/UL (ref 149–390)
PMV BLD AUTO: 10.2 FL (ref 8.9–12.7)
POTASSIUM SERPL-SCNC: 3.9 MMOL/L (ref 3.5–5.3)
RBC # BLD AUTO: 2.82 MILLION/UL (ref 3.81–5.12)
SODIUM SERPL-SCNC: 136 MMOL/L (ref 136–145)
WBC # BLD AUTO: 5.52 THOUSAND/UL (ref 4.31–10.16)

## 2019-10-10 PROCEDURE — 97535 SELF CARE MNGMENT TRAINING: CPT

## 2019-10-10 PROCEDURE — 80048 BASIC METABOLIC PNL TOTAL CA: CPT | Performed by: PHYSICIAN ASSISTANT

## 2019-10-10 PROCEDURE — 85027 COMPLETE CBC AUTOMATED: CPT | Performed by: PHYSICIAN ASSISTANT

## 2019-10-10 PROCEDURE — NC001 PR NO CHARGE: Performed by: INTERNAL MEDICINE

## 2019-10-10 PROCEDURE — NC001 PR NO CHARGE: Performed by: PHYSICIAN ASSISTANT

## 2019-10-10 PROCEDURE — 99238 HOSP IP/OBS DSCHRG MGMT 30/<: CPT | Performed by: PHYSICIAN ASSISTANT

## 2019-10-10 RX ORDER — LEVETIRACETAM 500 MG/1
500 TABLET ORAL 2 TIMES DAILY
Qty: 10 TABLET | Refills: 0 | Status: SHIPPED | OUTPATIENT
Start: 2019-10-10 | End: 2019-11-06 | Stop reason: HOSPADM

## 2019-10-10 RX ORDER — HEPARIN SODIUM 5000 [USP'U]/ML
5000 INJECTION, SOLUTION INTRAVENOUS; SUBCUTANEOUS EVERY 8 HOURS SCHEDULED
Status: DISCONTINUED | OUTPATIENT
Start: 2019-10-10 | End: 2019-10-10 | Stop reason: HOSPADM

## 2019-10-10 RX ORDER — HYDROCODONE BITARTRATE AND ACETAMINOPHEN 5; 325 MG/1; MG/1
1 TABLET ORAL EVERY 6 HOURS PRN
COMMUNITY
End: 2019-11-06 | Stop reason: HOSPADM

## 2019-10-10 RX ORDER — DOCUSATE SODIUM 100 MG/1
100 CAPSULE, LIQUID FILLED ORAL 2 TIMES DAILY PRN
Qty: 10 CAPSULE | Refills: 0
Start: 2019-10-10 | End: 2019-11-06 | Stop reason: HOSPADM

## 2019-10-10 RX ADMIN — DOCUSATE SODIUM 100 MG: 100 CAPSULE, LIQUID FILLED ORAL at 08:41

## 2019-10-10 RX ADMIN — ACETAMINOPHEN 975 MG: 325 TABLET ORAL at 05:26

## 2019-10-10 RX ADMIN — Medication 1 TABLET: at 08:41

## 2019-10-10 RX ADMIN — HEPARIN SODIUM 5000 UNITS: 5000 INJECTION, SOLUTION INTRAVENOUS; SUBCUTANEOUS at 10:00

## 2019-10-10 RX ADMIN — LEVOTHYROXINE SODIUM 112 MCG: 112 TABLET ORAL at 05:26

## 2019-10-10 RX ADMIN — LEVETIRACETAM 500 MG: 500 TABLET, FILM COATED ORAL at 08:41

## 2019-10-10 RX ADMIN — POLYETHYLENE GLYCOL 3350 17 G: 17 POWDER, FOR SOLUTION ORAL at 08:41

## 2019-10-10 RX ADMIN — SENNOSIDES 17.2 MG: 8.6 TABLET, FILM COATED ORAL at 08:41

## 2019-10-10 RX ADMIN — LIDOCAINE 1 PATCH: 50 PATCH CUTANEOUS at 08:42

## 2019-10-10 NOTE — ASSESSMENT & PLAN NOTE
- Acute kidney injury likely secondary to dehydration and diuretic use  Creatinine improving to 1 8 on 10/10/2019  - [address] Internal Medicine evaluation and recommendations  No need for Nephrology consult at this time  - Monitor urine output and renal function with daily BMP  - Set hold parameters for blood pressure medication for systolic blood pressure of 130 mmHg  - Avoid nephrotoxic medications  Will hold torsemide and Plaquenil for now  - Will need short-term outpatient follow-up with primary care provider and cardiologist to determine appropriate diuretic regimen

## 2019-10-10 NOTE — ASSESSMENT & PLAN NOTE
Wt Readings from Last 3 Encounters:   10/10/19 84 3 kg (185 lb 14 4 oz)   10/08/19 84 1 kg (185 lb 6 5 oz)   09/24/19 86 6 kg (191 lb)     - History of chronic diastolic heart failure with acute exacerbation earlier this year  - Followed by [facility] Cardiology, Dr Duc Alanis  Per daughter, the patient was recently started on a diuretic for significant volume overload  Due to acute kidney injury and suspected dehydration, diuretic therapy to be held at this time  - Check daily weights and closely monitor creatinine   - Will likely need some chronic diuretic therapy once acute kidney injury is resolved  - Await Internal Medicine evaluation and recommendations  - Close follow-up with Cardiology as an outpatient

## 2019-10-10 NOTE — ASSESSMENT & PLAN NOTE
Wt Readings from Last 3 Encounters:   10/10/19 84 3 kg (185 lb 14 4 oz)   10/08/19 84 1 kg (185 lb 6 5 oz)   09/24/19 86 6 kg (191 lb)     - History of chronic diastolic heart failure with acute exacerbation earlier this year  - Followed by [facility] Cardiology, Dr Ashtyn Chicas  Per daughter, the patient was recently started on a diuretic for significant volume overload  Due to acute kidney injury and suspected dehydration, diuretic therapy to be held at this time  - Check daily weights and closely monitor creatinine   - Will likely need some chronic diuretic therapy once acute kidney injury is resolved  - Await Internal Medicine evaluation and recommendations  - Close follow-up with Cardiology as an outpatient

## 2019-10-10 NOTE — PLAN OF CARE
Problem: Nutrition/Hydration-ADULT  Goal: Nutrient/Hydration intake appropriate for improving, restoring or maintaining nutritional needs  Description  Monitor and assess patient's nutrition/hydration status for malnutrition  Collaborate with interdisciplinary team and initiate plan and interventions as ordered  Monitor patient's weight and dietary intake as ordered or per policy  Utilize nutrition screening tool and intervene as necessary  Determine patient's food preferences and provide high-protein, high-caloric foods as appropriate       INTERVENTIONS:  - Monitor oral intake, urinary output, labs, and treatment plans  - Assess nutrition and hydration status and recommend course of action  - Evaluate amount of meals eaten  - Assist patient with eating if necessary   - Allow adequate time for meals  - Recommend/ encourage appropriate diets, oral nutritional supplements, and vitamin/mineral supplements  - Order, calculate, and assess calorie counts as needed  - Recommend, monitor, and adjust tube feedings and TPN/PPN based on assessed needs  - Assess need for intravenous fluids  - Provide specific nutrition/hydration education as appropriate  - Include patient/family/caregiver in decisions related to nutrition  Outcome: Progressing     Problem: Prexisting or High Potential for Compromised Skin Integrity  Goal: Skin integrity is maintained or improved  Description  INTERVENTIONS:  - Identify patients at risk for skin breakdown  - Assess and monitor skin integrity  - Assess and monitor nutrition and hydration status  - Monitor labs   - Assess for incontinence   - Turn and reposition patient  - Assist with mobility/ambulation  - Relieve pressure over bony prominences  - Avoid friction and shearing  - Provide appropriate hygiene as needed including keeping skin clean and dry  - Evaluate need for skin moisturizer/barrier cream  - Collaborate with interdisciplinary team   - Patient/family teaching  - Consider wound care consult   Outcome: Progressing     Problem: Potential for Falls  Goal: Patient will remain free of falls  Description  INTERVENTIONS:  - Assess patient frequently for physical needs  -  Identify cognitive and physical deficits and behaviors that affect risk of falls    -  [place] fall precautions as indicated by assessment   - Educate patient/family on patient safety including physical limitations  - Instruct patient to call for assistance with activity based on assessment  - Modify environment to reduce risk of injury  - Consider OT/PT consult to assist with strengthening/mobility  Outcome: Progressing     Problem: NEUROSENSORY - ADULT  Goal: Achieves stable or improved neurological status  Description  INTERVENTIONS  - Monitor and report changes in neurological status  - Monitor vital signs such as temperature, blood pressure, glucose, and any other labs ordered   - Initiate measures to prevent increased intracranial pressure  - Monitor for seizure activity and implement precautions if appropriate      Outcome: Progressing  Goal: Remains free of injury related to seizures activity  Description  INTERVENTIONS  - Maintain airway, patient safety  and administer oxygen as ordered  - Monitor patient for seizure activity, document and report duration and description of seizure to physician/advanced practitioner  - If seizure occurs,  ensure patient safety during seizure  - Reorient patient post seizure  - Seizure pads on all 4 side rails  - Instruct patient/family to notify RN of any seizure activity including if an aura is experienced  - Instruct patient/family to call for assistance with activity based on nursing assessment  - Administer anti-seizure medications if ordered    Outcome: Progressing  Goal: Achieves maximal functionality and self care  Description  INTERVENTIONS  - Monitor swallowing and airway patency with patient fatigue and changes in neurological status  - Encourage and assist patient to increase activity and self care     - Encourage visually impaired, hearing impaired and aphasic patients to use assistive/communication devices  Outcome: Progressing     Problem: SKIN/TISSUE INTEGRITY - ADULT  Goal: Skin integrity remains intact  Description  INTERVENTIONS  - Identify patients at risk for skin breakdown  - Assess and monitor skin integrity  - Assess and monitor nutrition and hydration status  - Monitor labs (i e  albumin)  - Assess for incontinence   - Turn and reposition patient  - Assist with mobility/ambulation  - Relieve pressure over bony prominences  - Avoid friction and shearing  - Provide appropriate hygiene as needed including keeping skin clean and dry  - Evaluate need for skin moisturizer/barrier cream  - Collaborate with interdisciplinary team (i e  Nutrition, Rehabilitation, etc )   - Patient/family teaching  Outcome: Progressing  Goal: Incision(s), wounds(s) or drain site(s) healing without S/S of infection  Description  INTERVENTIONS  - Assess and document risk factors for skin impairment   - Assess and document dressing, incision, wound bed, drain sites and surrounding tissue  - Consider nutrition services referral as needed  - Oral mucous membranes remain intact  - Provide patient/ family education  Outcome: Progressing     Problem: MUSCULOSKELETAL - ADULT  Goal: Maintain or return mobility to safest level of function  Description  INTERVENTIONS:  - Assess patient's ability to carry out ADLs; assess patient's baseline for ADL function and identify physical deficits which impact ability to perform ADLs (bathing, care of mouth/teeth, toileting, grooming, dressing, etc )  - Assess/evaluate cause of self-care deficits   - Assess range of motion  - Assess patient's mobility  - Assess patient's need for assistive devices and provide as appropriate  - Encourage maximum independence but intervene and supervise when necessary  - Involve family in performance of ADLs  - Assess for home care needs following discharge   - Consider OT consult to assist with ADL evaluation and planning for discharge  - Provide patient education as appropriate  Outcome: Progressing  Goal: Maintain proper alignment of affected body part  Description  INTERVENTIONS:  - Support, maintain and protect limb and body alignment  - Provide patient/ family with appropriate education  Outcome: Progressing     Problem: PAIN - ADULT  Goal: Verbalizes/displays adequate comfort level or baseline comfort level  Description  Interventions:  - Encourage patient to monitor pain and request assistance  - Assess pain using appropriate pain scale  - Administer analgesics based on type and severity of pain and evaluate response  - Implement non-pharmacological measures as appropriate and evaluate response  - Consider cultural and social influences on pain and pain management  - Notify physician/advanced practitioner if interventions unsuccessful or patient reports new pain  Outcome: Progressing     Problem: INFECTION - ADULT  Goal: Absence or prevention of progression during hospitalization  Description  INTERVENTIONS:  - Assess and monitor for signs and symptoms of infection  - Monitor lab/diagnostic results  - Monitor all insertion sites, i e  indwelling lines, tubes, and drains  - Monitor endotracheal if appropriate and nasal secretions for changes in amount and color  - [place] appropriate cooling/warming therapies per order  - Administer medications as ordered  - Instruct and encourage patient and family to use good hand hygiene technique  - Identify and instruct in appropriate isolation precautions for identified infection/condition  Outcome: Progressing     Problem: SAFETY ADULT  Goal: Maintain or return to baseline ADL function  Description  INTERVENTIONS:  -  Assess patient's ability to carry out ADLs; assess patient's baseline for ADL function and identify physical deficits which impact ability to perform ADLs (bathing, care of mouth/teeth, toileting, grooming, dressing, etc )  - Assess/evaluate cause of self-care deficits   - Assess range of motion  - Assess patient's mobility; develop plan if impaired  - Assess patient's need for assistive devices and provide as appropriate  - Encourage maximum independence but intervene and supervise when necessary  - Involve family in performance of ADLs  - Assess for home care needs following discharge   - Consider OT consult to assist with ADL evaluation and planning for discharge  - Provide patient education as appropriate  Outcome: Progressing  Goal: Maintain or return mobility status to optimal level  Description  INTERVENTIONS:  - Assess patient's baseline mobility status (ambulation, transfers, stairs, etc )    - Identify cognitive and physical deficits and behaviors that affect mobility  - Identify mobility aids required to assist with transfers and/or ambulation (gait belt, sit-to-stand, lift, walker, cane, etc )  - [place] fall precautions as indicated by assessment  - Record patient progress and toleration of activity level on Mobility SBAR; progress patient to next Phase/Stage  - Instruct patient to call for assistance with activity based on assessment  - Consider rehabilitation consult to assist with strengthening/weightbearing, etc   Outcome: Progressing     Problem: DISCHARGE PLANNING  Goal: Discharge to home or other facility with appropriate resources  Description  INTERVENTIONS:  - Identify barriers to discharge w/patient and caregiver  - Arrange for needed discharge resources and transportation as appropriate  - Identify discharge learning needs (meds, wound care, etc )  - Arrange for interpretive services to assist at discharge as needed  - Refer to Case Management Department for coordinating discharge planning if the patient needs post-hospital services based on physician/advanced practitioner order or complex needs related to functional status, cognitive ability, or social support system  Outcome: Progressing     Problem: Knowledge Deficit  Goal: Patient/family/caregiver demonstrates understanding of disease process, treatment plan, medications, and discharge instructions  Description  Complete learning assessment and assess knowledge base    Interventions:  - Provide teaching at level of understanding  - Provide teaching via preferred learning methods  Outcome: Progressing

## 2019-10-10 NOTE — PROGRESS NOTES
Progress Note - Geriatric Medicine   Audelia Mackay 80 y o  female MRN: [id number]  Unit/Bed#: [facility] 612-01 Encounter: [id number]      Assessment/Plan:    Mechanical fall/ Subdural hematoma  Was on Plavix for CVA/ DDAVP given   Plavix on hold for 2 weeks as Neurosurgery team  Ct brain: acute subdural hematoma at the left paramedian anterior falx  On Keppra 500 mg PO BID for seizer prevention  Denies headache, nausea, vomiting or vision change   Neck pain / Geriatric pain control with Tylenol 975 mg Q8H PO  Lidocaine patch 5%   Diaudid 0 2 mg Q4H PRN IV  Oxycodone IR 2 5 mg Q4H PRN PO     HERLINDA on CKD stage  3  Bun/Cr: 51/ 1 87 from 60/2 44  K: 3 9   Avoid nephrotoxic drugs     Urinary Incontinence  Encourage scheduled toilet   Reduce fluids intake before bed   Fall precaution/ assistant with trans  On diuretics and has severe mixed incontinence  Consider Urogynecology consult on discharge     Ambulatory dysfunction   Multifactorial with current acute and  chronic medical conditions, hx of two CVA, urinary incontinence/ previous fall with neck injury  Uses walker and cane at home  CT Brain: Tiny acute subdural hematoma at the left paramedian anterior falx  Ct Spine: Postoperative changes of posterior cervical fusion at C1 and C2 for repair of a displaced odontoid fracture   There is stable 5 mm of anterior displacement of the odontoid process in relation to the body of C2  Continue Fall precaution   Encourage assistance with all transfers  PT and OT evaluation and treat     Frailty/Deconditioning/ Debility  Multifactorial  with acute and chronic medical condition   Continue fall precaution  Nutrition and  hydration support     Cognition Assesment  Alert and oriented X 4   MiniCog: 3/5 has recent memory issues going on  Family hx of dementia in father  Hx CVA twice  2005/2008   CT head has Marked chronic small vessel ischemic changes and mild volume loss     Consider comprehensive geriatric assessment as out patient  Continue frequent reorientation and redirection  Encourage family and friends involvement  Delirium precaution  High risk with trauma/ pain/ hospitalization/ possible previous hx of Delirium according to daughter  Maintain sleep and wake cycle  Less interruption during the night   Avoid medication such as benzodiazepine, tramadol, benadryl, anticholinergics     Congestive Hear failure   EF: 55% 08/2019  Was on Lasix 40 mg po qd d/c due to acute kidney injury  Weight monitoring / lost 12 lbs since August  Had acute excerebration in August   Follow up with Cardiologist  Dr Suraj Burrows    Subjective:   80  Y o female patient was seen and examined at her room with her son presence  Patient stated that  she her pain has been reduced  She has on and off pain in her neck and back due to the fall  She denies any headache, nausea or other new complaints  Her baseline Cr; 1 6  And her Cr today came down to 1 87  Primary team is checking her kidney functions from home  She had her bowel movement  Review of Systems   Constitutional: Negative for activity change, appetite change, chills, fatigue and fever  HENT: Negative for congestion, hearing loss, trouble swallowing and voice change  Eyes: Negative for photophobia, discharge and visual disturbance  Respiratory: Negative for apnea, cough, shortness of breath and wheezing  Cardiovascular: Negative for chest pain, palpitations and leg swelling  Gastrointestinal: Negative for abdominal distention, abdominal pain, constipation, nausea and vomiting  Genitourinary: Negative for difficulty urinating, flank pain, frequency and urgency  Musculoskeletal: Positive for back pain and neck pain  Neurological: Negative for dizziness, tremors, speech difficulty, weakness, light-headedness and headaches  Psychiatric/Behavioral: Negative for agitation, confusion, decreased concentration, hallucinations and sleep disturbance     All other systems reviewed and are negative  Objective:     Vitals: Blood pressure 97/52, pulse 56, temperature (!) 97 4 °F (36 3 °C), resp  rate 18, height 4' 11" (1 499 m), weight 84 3 kg (185 lb 14 4 oz), SpO2 94 %, not currently breastfeeding  ,Body mass index is 37 55 kg/m²  Intake/Output Summary (Last 24 hours) at 10/10/2019 1351  Last data filed at 10/10/2019 0945  Gross per 24 hour   Intake 418 ml   Output 150 ml   Net 268 ml       Current Medications: Reviewed    Physical Exam:   Physical Exam   Constitutional: She is oriented to person, place, and time  She appears well-developed and well-nourished  No distress  HENT:   Head: Normocephalic and atraumatic  Mouth/Throat: Oropharynx is clear and moist    Eyes: Pupils are equal, round, and reactive to light  Conjunctivae and EOM are normal  Right eye exhibits no discharge  Left eye exhibits no discharge  Neck: Normal range of motion  Cardiovascular: Normal rate, regular rhythm and normal heart sounds  Exam reveals no friction rub  No murmur heard  Pulmonary/Chest: Effort normal and breath sounds normal  No respiratory distress  She has no wheezes  She has no rales  Abdominal: Soft  Bowel sounds are normal  She exhibits no distension  Musculoskeletal: She exhibits no edema, tenderness or deformity  Neurological: She is alert and oriented to person, place, and time  No cranial nerve deficit or sensory deficit  Oriented to person, place, date, month and year    Skin: Skin is warm and dry  No erythema  No pallor  Psychiatric: She has a normal mood and affect  Her behavior is normal  Thought content normal    Nursing note and vitals reviewed  Invasive Devices     Peripheral Intravenous Line            Peripheral IV 10/08/19 Right Forearm 1 day          Drain            External Urinary Catheter 1 day                Lab, Imaging and other studies: I have personally reviewed pertinent reports       Bun/ Cr: 51/ 1 87  NA: 136/ K ; 3 9

## 2019-10-10 NOTE — ASSESSMENT & PLAN NOTE
- Acute kidney injury likely secondary to dehydration and diuretic use  Creatinine improving to 1 8 on 10/10/2019  - [address] Internal Medicine evaluation and recommendations  No need for Nephrology consult at this time  - Plan to monitor renal function with repeat BMP on 10/11/2019 and have short-term outpatient follow-up with PCP and cardiologist   - Avoid nephrotoxic medications  Will hold torsemide and Plaquenil for now  - Will need short-term outpatient follow-up with primary care provider and cardiologist to determine appropriate diuretic regimen

## 2019-10-10 NOTE — ASSESSMENT & PLAN NOTE
- Small traumatic subdural hematoma without neurologic deficit  - Appreciate Neurosurgery evaluation and recommendations  Non operative management recommended  - Repeat CT scan of the head from 10/09/2019 reviewed with stable findings and no evidence of new or worsening hemorrhage   - Monitor neurologic exam   - I will discuss initiating DVT prophylaxis with the Neurosurgery service today   - PT and OT evaluation and treatment as indicated  - Manage of any associated symptoms such as headaches with Tylenol and/or as indicated

## 2019-10-10 NOTE — PLAN OF CARE
Problem: OCCUPATIONAL THERAPY ADULT  Goal: Performs self-care activities at highest level of function for planned discharge setting  See evaluation for individualized goals  Description  Treatment Interventions: ADL retraining, Functional transfer training, Endurance training, Cognitive reorientation, Patient/family training, Equipment evaluation/education, Compensatory technique education, Energy conservation, Activityengagement          See flowsheet documentation for full assessment, interventions and recommendations  Outcome: Progressing  Note:   Limitation: Decreased ADL status, Decreased Safe judgement during ADL, Decreased cognition, Decreased endurance, Decreased self-care trans, Decreased high-level ADLs  Prognosis: Good, Fair  Assessment: pt participated in am ot session and was seen focusing adl's toileting and activity tolerence  pts dtr was present and assisted with lb care  pt will have asst at home with dtr and asst for homemaking  pt toelrated adls with minimal pain in r back  pt was sba ub and mod asst lb adls, s for functional mobility     OT Discharge Recommendation: Short Term Rehab  OT - OK to Discharge:  Yes  April SHAWN Elizabeth

## 2019-10-10 NOTE — DISCHARGE SUMMARY
Discharge- Elian Ward 1934, 80 y o  female MRN: [id number]    Unit/Bed#: [facility] 612-01 Encounter: [id number]    Primary Care Provider: Zakia Schaffer DO   Date and time admitted to hospital: 10/8/2019  5:54 PM        Ambulatory dysfunction  Assessment & Plan  - Ambulatory dysfunction with fall leading to below noted injuries  - Fall precautions   - PT and OT evaluation and treatment as indicated  - Case management consult for disposition planning  * Traumatic subdural hematoma without loss of consciousness (HCC)  Assessment & Plan  - Small traumatic subdural hematoma without neurologic deficit  - Appreciate Neurosurgery evaluation and recommendations  Non operative management recommended  - Repeat CT scan of the head from 10/09/2019 reviewed with stable findings and no evidence of new or worsening hemorrhage   - Monitor neurologic exam   - I will discuss initiating DVT prophylaxis with the Neurosurgery service today   - PT and OT evaluation and treatment as indicated  - Manage of any associated symptoms such as headaches with Tylenol and/or as indicated  Acute kidney injury [facility])  Assessment & Plan  - Acute kidney injury likely secondary to dehydration and diuretic use  Creatinine improving to 1 8 on 10/10/2019  - [address] Internal Medicine evaluation and recommendations  No need for Nephrology consult at this time  - Plan to monitor renal function with repeat BMP on 10/11/2019 and have short-term outpatient follow-up with PCP and cardiologist   - Avoid nephrotoxic medications  Will hold torsemide and Plaquenil for now  - Will need short-term outpatient follow-up with primary care provider and cardiologist to determine appropriate diuretic regimen  Stage 3 chronic kidney disease (HCC)  Assessment & Plan  - Stage 3 chronic kidney disease with baseline creatinine appearing to be between 1 2-1 6   - Management of acute kidney injury as noted    - Will need close outpatient follow up with both primary care provider and Cardiology  May also need outpatient follow up with nephrologist     Essential hypertension  Assessment & Plan  - Chronic history of essential hypertension   - Continue home medication regimen, Norvasc, but adjust hold parameter for systolic blood pressure of 130 mmHg in setting of acute kidney injury  Chronic diastolic heart failure (HCC)  Assessment & Plan  Wt Readings from Last 3 Encounters:   10/10/19 84 3 kg (185 lb 14 4 oz)   10/08/19 84 1 kg (185 lb 6 5 oz)   09/24/19 86 6 kg (191 lb)     - History of chronic diastolic heart failure with acute exacerbation earlier this year  - Followed by [facility]iology, Dr Nakita Cabrera  Per daughter, the patient was recently started on a diuretic for significant volume overload  Due to acute kidney injury and suspected dehydration, diuretic therapy to be held at this time  - Check daily weights and closely monitor creatinine   - Will likely need some chronic diuretic therapy once acute kidney injury is resolved  - Await Internal Medicine evaluation and recommendations  - Close follow-up with Cardiology as an outpatient  Discharge Summary - Trauma Service   Jj Jones 80 y o  female MRN: [id number]  Unit/Bed#: [facility] 336-17 Encounter: [id number]    Admission Date: 10/8/2019     Discharge Date: 10/10/2019    Admitting Diagnosis: Acute on chronic diastolic heart failure ([facility] [address] ) [I50 33]  Diabetes ([address] ) [E11 9]  Subdural hematoma, acute ([facility] [address] ) [S06 5X9A]  Ambulatory dysfunction [R26 2]  Type 2 diabetes mellitus with diabetic polyneuropathy, without long-term current use of insulin (HCC) [E11 42]  Stage 3 chronic kidney disease ([facility] [address] ) [N18 3]  Unspecified multiple injuries, initial encounter [T07  XXXA]  Hypertension, unspecified type [I10]    Discharge Diagnosis: See above  Attending and Service: Dr Rizo Resides, Acute Care Surgical Services  Consulting Physician(s):     1  Geriatric Medicine  2  [facility]    3    [facility]  4  [facility] Internal Medicine  Imaging and Procedures Performed:     Ct Head Wo Contrast    Result Date: 10/9/2019  Impression: 1  Grossly stable right anterior parafalcine subdural hematoma measuring up to 4 mm in maximum thickness  No new acute intracranial hemorrhage  2   Chronic white matter microangiopathy Workstation performed: [id number]     Ct Head Without Contrast    Result Date: 10/8/2019  Impression: Tiny acute subdural hematoma at the left paramedian anterior falx   No intraparenchymal hemorrhage  Marked chronic small vessel ischemic changes and mild volume loss  I personally discussed this study with Renu Dick on 10/8/2019 at 4:22 PM  Workstation performed: [id number]     Ct Cervical Spine Without Contrast    Result Date: 10/8/2019  Impression: No acute cervical spine fracture or subluxation  Postoperative changes of posterior cervical fusion at C1 and C2 for repair of a displaced odontoid fracture  There is stable 5 mm of anterior displacement of the odontoid process in relation to the body of C2  Moderate to marked 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 with multilevel anterior bridging osteophytes  There is a stable previously described nondisplaced fracture of an anterior bridging osteophyte at C6  Workstation performed: [id number]     Hospital Course: Elian Ward is a 40-year-old female presented initially to [address] [place] [address] after a fall  Her workup over there was notable for a subdural hematoma  The patient was transferred to [facility] for trauma evaluation  On arrival to [facility], she stated that she lost her balance secondary to a sliding door she was trying to walk through to go to her oncologist appointment  She fell and struck her right side and her the right side of her head  She denied losing consciousness  She also denied having a headache having any weakness or sensory changes  She did complain of neck pain, but noted it was chronic and unchanged from her baseline  On her initial trauma evaluation, her primary survey was unremarkable  On secondary survey, patient was bradycardic but had normal vital signs otherwise  Her initial workup included labs in the above-noted imaging studies  The patient was admitted to the trauma service status post fall with traumatic subdural hematoma and platelet dysfunction secondary to Plavix use  On admission, the patient was placed on H O T  Protocol and Neurosurgery was consulted  She was given DDAVP and her anti-platelet medication was held  In addition to her traumatic injuries, she was found to have acute kidney injury on baseline chronic kidney disease  All nephrotoxic medications were held, her blood pressure medication was added adjusted appropriately to allow the patient to have permissive hypertension, and Internal Medicine was consulted to help manage her medical comorbidities  Geriatric Medicine was also consulted to assist in the management of her medical comorbidities and medication regimen during her hospitalization  Due to significant history of CHF, no aggressive IV fluid resuscitation was initiated, but her diuretics were held  The patient maintain adequate urine output and did not have any significant change in her weight, but did have improvement in her renal function by holding the nephrotoxic medications  No additional workup or intervention was necessary in the management of her medical conditions  Neurosurgery repeated a CT scan of her head demonstrating stability of the bleed and cleared for discharge home  PT and OT evaluated the patient and recommended home PT  Case Management assisted with disposition planning, the patient was deemed stable for discharge on 10/10/2019 with short term outpatient follow-up with her primary care provider and cardiologist as well as with a repeat BMP on 10/11/2019      On discharge, the patient is instructed to follow-up with the patient's primary care provider to review the events of the patient's recent hospitalization  The patient is instructed to follow-up in the Trauma Clinic as needed  The patient is instructed to follow up with [facility] Neurosurgery in 2 weeks for re-evaluation with repeat CT scan of her head 2-3 days prior to the appointment  The patient is instructed to follow up with her primary care provider and cardiologist within the next 1 week to discuss her diuretic regimen and renal function  The patient should follow the provided discharge instructions  Condition at Discharge: good     Discharge instructions/Information to patient and family:   See after visit summary for information provided to patient and family  Provisions for Follow-Up Care:  See after visit summary for information related to follow-up care and any pertinent home health orders  Disposition: See After Visit Summary for discharge disposition information  Planned Readmission: No    Discharge Statement   I spent 25 minutes discharging the patient  This time was spent on the day of discharge  I had direct contact with the patient on the day of discharge  Additional documentation is required if more than 30 minutes were spent on discharge  Discharge Medications:  See after visit summary for reconciled discharge medications provided to patient and family        Larisa Lara PA-C  10/10/2019  5:50 PM

## 2019-10-10 NOTE — PROGRESS NOTES
Progress Note - Sonia Joaquin 1934, 80 y o  female MRN: [id number]    Unit/Bed#: [facility] 612-01 Encounter: [id number]    Primary Care Provider: Josie Kidd DO   Date and time admitted to hospital: 10/8/2019  5:54 PM        Ambulatory dysfunction  Assessment & Plan  - Ambulatory dysfunction with fall leading to below noted injuries  - Fall precautions   - PT and OT evaluation and treatment as indicated  - Case management consult for disposition planning  * Traumatic subdural hematoma without loss of consciousness (HCC)  Assessment & Plan  - Small traumatic subdural hematoma without neurologic deficit  - Appreciate Neurosurgery evaluation and recommendations  Non operative management recommended  - Repeat CT scan of the head from 10/09/2019 reviewed with stable findings and no evidence of new or worsening hemorrhage   - Monitor neurologic exam   - PT and OT evaluation and treatment as indicated  - Manage of any associated symptoms such as headaches with Tylenol and/or as indicated  Acute kidney injury [facility])  Assessment & Plan  - Acute kidney injury likely secondary to dehydration and diuretic use  Creatinine improving to 1 8 on 10/10/2019  - [address] Internal Medicine evaluation and recommendations  No need for Nephrology consult at this time  - Monitor urine output and renal function with daily BMP  - Set hold parameters for blood pressure medication for systolic blood pressure of 130 mmHg  - Avoid nephrotoxic medications  Will hold torsemide and Plaquenil for now  - Will need short-term outpatient follow-up with primary care provider and cardiologist to determine appropriate diuretic regimen  Stage 3 chronic kidney disease (HCC)  Assessment & Plan  - Stage 3 chronic kidney disease with baseline creatinine appearing to be between 1 5-1 6   - Management of acute kidney injury as noted  - Will need close outpatient follow up with both primary care provider and Cardiology    May also need outpatient follow up with nephrologist     Essential hypertension  Assessment & Plan  - Chronic history of essential hypertension   - Continue home medication regimen, Norvasc, but adjust hold parameter for systolic blood pressure of 130 mmHg in setting of acute kidney injury  Chronic diastolic heart failure (HCC)  Assessment & Plan  Wt Readings from Last 3 Encounters:   10/10/19 84 3 kg (185 lb 14 4 oz)   10/08/19 84 1 kg (185 lb 6 5 oz)   09/24/19 86 6 kg (191 lb)     - History of chronic diastolic heart failure with acute exacerbation earlier this year  - Followed by [facility] Cardiology, Dr Darby Godoy  Per daughter, the patient was recently started on a diuretic for significant volume overload  Due to acute kidney injury and suspected dehydration, diuretic therapy to be held at this time  - Check daily weights and closely monitor creatinine   - Will likely need some chronic diuretic therapy once acute kidney injury is resolved  - Await Internal Medicine evaluation and recommendations  - Close follow-up with Cardiology as an outpatient  Bedside rounds completed with nurse Doni Juarez  Disposition:  Anticipate discharge home when medically appropriate, likely in the next 24-48 hours  Continue PT and OT evaluation and treatment as indicated  Case Management following for disposition planning  SUBJECTIVE:  Chief Complaint:  I feel well this morning      Subjective:  Patient is feeling better today and is hopeful that she be discharged home soon  She notes she had only minimal right-sided back discomfort overnight  She denies any headaches, dizziness or lightheadedness, visual changes, nausea or vomiting  She is tolerating an oral diet  She offers no new complaints this morning        OBJECTIVE:     Meds/Allergies     Current Facility-Administered Medications:     acetaminophen (TYLENOL) tablet 975 mg, 975 mg, Oral, Q8H [facility], Mook Holly PA-C, 975 mg at 10/10/19 0526    amLODIPine (NORVASC) tablet 5 mg, 5 mg, Oral, Mook MCCLAIN PA-C    calcium carbonate-vitamin D (OSCAL-D) 500 mg-200 units per tablet 1 tablet, 1 tablet, Oral, BID With Meals, Radha Capuchin, DO, 1 tablet at 10/10/19 0841    docusate sodium (COLACE) capsule 100 mg, 100 mg, Oral, BID, Radha Capuchin, DO, 100 mg at 10/10/19 0841    gabapentin (NEURONTIN) capsule 300 mg, 300 mg, Oral, HS, Radha Capuchin, DO, 300 mg at 10/09/19 2139    HYDROmorphone (DILAUDID) injection 0 2 mg, 0 2 mg, Intravenous, Q3H PRN, Radha Capuchin, DO    levETIRAcetam (KEPPRA) tablet 500 mg, 500 mg, Oral, BID, Radha Capuchin, DO, 500 mg at 10/10/19 0841    levothyroxine tablet 112 mcg, 112 mcg, Oral, Early Morning, Radha Capuchin, DO, 112 mcg at 10/10/19 0526    lidocaine (LIDODERM) 5 % patch 1 patch, 1 patch, Topical, Daily, Radha Capuchin, DO, 1 patch at 10/10/19 8391    methocarbamol (ROBAXIN) tablet 500 mg, 500 mg, Oral, TID PRN, Radha Capuchin, DO, 500 mg at 10/09/19 1400    multivitamin-minerals (CENTRUM ADULTS) tablet 1 tablet, 1 tablet, Oral, Daily, Radha Capuchin, DO, 1 tablet at 10/10/19 0841    ondansetron (ZOFRAN) injection 4 mg, 4 mg, Intravenous, Q6H PRN, Radha Capuchin, DO    oxyCODONE (ROXICODONE) IR tablet 2 5 mg, 2 5 mg, Oral, Q4H PRN, Radha Capuchin, DO    oxyCODONE (ROXICODONE) IR tablet 5 mg, 5 mg, Oral, Q4H PRN, Radha Capuchin, DO, 5 mg at 10/09/19 2139    polyethylene glycol (MIRALAX) packet 17 g, 17 g, Oral, Daily, Radha Capuchin, DO, 17 g at 10/10/19 0841    senna (SENOKOT) tablet 17 2 mg, 2 tablet, Oral, Daily, Radha Capuchin, DO, 17 2 mg at 10/10/19 0841    sertraline (ZOLOFT) tablet 25 mg, 25 mg, Oral, HS, Radha Capuchin, DO, 25 mg at 10/09/19 2139     Vitals:   Vitals:    10/10/19 0729   BP: 97/52   Pulse: 56   Resp: 18   Temp: (!) 97 4 °F (36 3 °C)   SpO2: 94%       Intake/Output:  I/O       10/08 0701 - 10/09 0700 10/09 0701 - 10/10 0700 10/10 0701 - 10/11 0700    P  O   418     IV Piggyback 50      Total Intake(mL/kg) 50 (0 6) 418 (5)     Urine (mL/kg/hr) 250 150 (0 1)     Total Output 250 150     Net -200 +268            Unmeasured Urine Occurrence  3 x            Nutrition/GI Proph/Bowel Reg:  Cardiac diet; Colace, MiraLax and senna  Physical Exam:   GENERAL APPEARANCE: Patient in no acute distress  HEENT: NCAT; PERRL, EOMs intact; Mucous membranes moist  NECK / BACK:  Minimal right mid lateral back tenderness without deformity; no midline cervical, thoracic or lumbar spine tenderness  CV: Regular rate and rhythm; + S1, S2; no murmur/gallops/rubs appreciated  CHEST / LUNGS: Clear to auscultation; no wheezes/rales/rhonci; no chest wall tenderness today  ABD: NABS; soft; non-distended; non-tender  EXT: +2 pulses bilaterally upper & lower extremities; no clubbing/cyanosis, mild nonpitting edema of the bilateral lower extremities  NEURO: GCS 15; no focal neurologic deficits; neurovascularly intact  SKIN: Warm, dry and well perfused; no rash; no jaundice  Invasive Devices     Peripheral Intravenous Line            Peripheral IV 10/08/19 Right Forearm 1 day          Drain            External Urinary Catheter 1 day                 Lab Results:   Results: I have personally reviewed pertinent reports   , BMP/CMP:   Lab Results   Component Value Date    SODIUM 136 10/10/2019    K 3 9 10/10/2019    CL 97 (L) 10/10/2019    CO2 31 10/10/2019    BUN 51 (H) 10/10/2019    CREATININE 1 87 (H) 10/10/2019    CALCIUM 10 3 (H) 10/10/2019    EGFR 24 10/10/2019   , CBC:   Lab Results   Component Value Date    WBC 5 52 10/10/2019    HGB 9 4 (L) 10/10/2019    HCT 29 4 (L) 10/10/2019     (H) 10/10/2019     10/10/2019    MCH 33 3 10/10/2019    MCHC 32 0 10/10/2019    RDW 14 1 10/10/2019    MPV 10 2 10/10/2019    and Coagulation: No results found for: PT, INR, APTT  Imaging/EKG Studies: Results: I have personally reviewed pertinent reports      Other Studies: N/A  VTE Prophylaxis: Sequential compression device [facility])      Johnathan Bo PA-C  10/10/2019 06:52 AM

## 2019-10-10 NOTE — OCCUPATIONAL THERAPY NOTE
Occupational Therapy Treatment Note:       10/10/19 1058   Restrictions/Precautions   Other Precautions Cognitive; Fall Risk   Transfers   Sit to Stand 5  Supervision   Stand to Sit 5  Supervision   Functional Mobility   Functional Mobility 5  Supervision   Additional items Rolling walker   Cognition   Overall Cognitive Status Impaired   Arousal/Participation Alert   Attention Attends with cues to redirect   Following Commands Follows one step commands without difficulty   Activity Tolerance   Activity Tolerance Patient tolerated treatment well   Assessment   Assessment pt participated in am ot session and was seen focusing adl's toileting and activity tolerence  pts dtr was present and assisted with lb care  pt will have asst at home with dtr and asst for homemaking  pt toelrated adls with minimal pain in r back  pt was sba ub and mod asst lb adls, s for functional mobility   Plan   Treatment Interventions ADL retraining;Functional transfer training; Endurance training;Patient/family training;Equipment evaluation/education; Activityengagement   Goal Expiration Date 10/23/19   OT Treatment Day 1   OT Frequency 3-5x/wk   Recommendation   OT Discharge Recommendation Short Term Rehab   Barthel Index   Feeding 10   Bathing 5   Grooming Score 5   Dressing Score 5   Bladder Score 5   Bowels Score 10   Toilet Use Score 5   Transfers (Bed/Chair) Score 10   Mobility (Level Surface) Score 0   Stairs Score 0   Barthel Index Score 55   Modified Rosy Scale   Modified Rosy Scale 4   April A SHAWN Jean-Baptiste

## 2019-10-10 NOTE — ASSESSMENT & PLAN NOTE
- Stage 3 chronic kidney disease with baseline creatinine appearing to be between 1 2-1 6   - Management of acute kidney injury as noted  - Will need close outpatient follow up with both primary care provider and Cardiology    May also need outpatient follow up with nephrologist

## 2019-10-10 NOTE — QUICK NOTE
I spent approximately 45 minutes coordinating care for discharge today much of that time was spent in conversation with the patient as well as her son and daughter regarding her current condition and expected course over the next several days  We discussed that if she is discharged today, she will need to have a BMP for follow-up on her renal function in the morning and continue to hold her diuretic therapy as well as some other home medications  I noted that I had contacted her primary care provider's office as well as her cardiologist's office to discuss the events surrounding her admission and the complication she has had with some of her chronic medical conditions, notably her renal impairment  We discussed that both her primary care physician's office and her cardiologist's office up prepared to have close outpatient follow-up in our in agreement with the tentative plan to repeat her renal function studies tomorrow and continue to hold some of her home medications on discharge  After this discussion and answering their questions and concerns, the plan will be to proceed with discharge today with [facility] for home health therapy and nursing care      Ismael Szymanski PA-C  10/10/2019 12:32 AM

## 2019-10-11 NOTE — UTILIZATION REVIEW
Notification of Discharge  This is a Notification of Discharge from our facility [address]  Please be advised that this patient has been discharge from our facility  Below you will find the admission and discharge date and time including the patients disposition  PRESENTATION DATE: 10/8/2019  5:54 PM  OBS ADMISSION DATE:   IP ADMISSION DATE: 10/8/19 1930   DISCHARGE DATE: 10/10/2019  2:25 PM  DISPOSITION: Home with [facility] with [facility]   Admission Orders listed below:  Admission Orders (From admission, onward)     Ordered        10/08/19 1943  Inpatient Admission  Once                   Please contact the UR Department if additional information is required to close this patient's authorization/case  [address]  Utilization Review Department  Phone: [phone]; Fax [phone]  [email or web address]  ATTENTION: Please call with any questions or concerns to [phone]  and carefully listen to the prompts so that you are directed to the right person  Send all requests for admission clinical reviews, approved or denied determinations and any other requests to fax [phone]   All voicemails are confidential

## 2019-10-12 ENCOUNTER — HOSPITAL ENCOUNTER (INPATIENT)
Facility: HOSPITAL | Age: 84
LOS: 7 days | DRG: 091 | End: 2019-10-19
Attending: EMERGENCY MEDICINE | Admitting: SURGERY
Payer: COMMERCIAL

## 2019-10-12 ENCOUNTER — APPOINTMENT (EMERGENCY)
Dept: RADIOLOGY | Facility: HOSPITAL | Age: 84
DRG: 091 | End: 2019-10-12
Payer: COMMERCIAL

## 2019-10-12 DIAGNOSIS — I50.32 CHRONIC DIASTOLIC HEART FAILURE (HCC): ICD-10-CM

## 2019-10-12 DIAGNOSIS — N17.9 ACUTE KIDNEY INJURY (HCC): ICD-10-CM
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
## 2019-10-12 LAB
ANION GAP SERPL CALCULATED.3IONS-SCNC: 8 MMOL/L (ref 4–13)
BUN SERPL-MCNC: 43 MG/DL (ref 5–25)
CALCIUM SERPL-MCNC: 10.3 MG/DL (ref 8.3–10.1)
CHLORIDE SERPL-SCNC: 93 MMOL/L (ref 100–108)
CO2 SERPL-SCNC: 29 MMOL/L (ref 21–32)
CREAT SERPL-MCNC: 1.87 MG/DL (ref 0.6–1.3)
GFR SERPL CREATININE-BSD FRML MDRD: 24 ML/MIN/1.73SQ M
GLUCOSE SERPL-MCNC: 101 MG/DL (ref 65–140)
POTASSIUM SERPL-SCNC: 4.3 MMOL/L (ref 3.5–5.3)
SODIUM SERPL-SCNC: 130 MMOL/L (ref 136–145)

## 2019-10-12 PROCEDURE — 99285 EMERGENCY DEPT VISIT HI MDM: CPT | Performed by: EMERGENCY MEDICINE

## 2019-10-12 PROCEDURE — 99285 EMERGENCY DEPT VISIT HI MDM: CPT

## 2019-10-12 PROCEDURE — 80048 BASIC METABOLIC PNL TOTAL CA: CPT | Performed by: EMERGENCY MEDICINE

## 2019-10-12 PROCEDURE — 36415 COLL VENOUS BLD VENIPUNCTURE: CPT | Performed by: EMERGENCY MEDICINE

## 2019-10-12 PROCEDURE — 70450 CT HEAD/BRAIN W/O DYE: CPT

## 2019-10-12 RX ORDER — ONDANSETRON 2 MG/ML
4 INJECTION INTRAMUSCULAR; INTRAVENOUS EVERY 6 HOURS PRN
Status: DISCONTINUED | OUTPATIENT
Start: 2019-10-12 | End: 2019-10-19 | Stop reason: HOSPADM

## 2019-10-12 RX ORDER — ALBUTEROL SULFATE 90 UG/1
2 AEROSOL, METERED RESPIRATORY (INHALATION) EVERY 4 HOURS PRN
Status: DISCONTINUED | OUTPATIENT
Start: 2019-10-12 | End: 2019-10-19 | Stop reason: HOSPADM

## 2019-10-12 RX ORDER — GABAPENTIN 300 MG/1
300 CAPSULE ORAL 3 TIMES DAILY
Status: DISCONTINUED | OUTPATIENT
Start: 2019-10-12 | End: 2019-10-19 | Stop reason: HOSPADM

## 2019-10-12 RX ORDER — METHOCARBAMOL 500 MG/1
500 TABLET, FILM COATED ORAL 3 TIMES DAILY PRN
Status: DISCONTINUED | OUTPATIENT
Start: 2019-10-12 | End: 2019-10-19 | Stop reason: HOSPADM

## 2019-10-12 RX ORDER — SERTRALINE HYDROCHLORIDE 25 MG/1
25 TABLET, FILM COATED ORAL
Status: DISCONTINUED | OUTPATIENT
Start: 2019-10-12 | End: 2019-10-19 | Stop reason: HOSPADM

## 2019-10-12 RX ORDER — AMLODIPINE BESYLATE 5 MG/1
5 TABLET ORAL EVERY MORNING
Status: DISCONTINUED | OUTPATIENT
Start: 2019-10-13 | End: 2019-10-19 | Stop reason: HOSPADM

## 2019-10-12 RX ORDER — HEPARIN SODIUM 5000 [USP'U]/ML
5000 INJECTION, SOLUTION INTRAVENOUS; SUBCUTANEOUS EVERY 8 HOURS SCHEDULED
Status: DISCONTINUED | OUTPATIENT
Start: 2019-10-12 | End: 2019-10-19 | Stop reason: HOSPADM

## 2019-10-12 RX ORDER — LORAZEPAM 0.5 MG/1
0.5 TABLET ORAL EVERY 8 HOURS PRN
Status: DISCONTINUED | OUTPATIENT
Start: 2019-10-12 | End: 2019-10-19 | Stop reason: HOSPADM

## 2019-10-12 RX ORDER — LORAZEPAM 0.5 MG/1
TABLET ORAL EVERY 8 HOURS PRN
COMMUNITY
End: 2019-11-06 | Stop reason: HOSPADM

## 2019-10-12 RX ORDER — DOCUSATE SODIUM 100 MG/1
100 CAPSULE, LIQUID FILLED ORAL 2 TIMES DAILY PRN
Status: DISCONTINUED | OUTPATIENT
Start: 2019-10-12 | End: 2019-10-14

## 2019-10-12 RX ORDER — LEVETIRACETAM 500 MG/1
500 TABLET ORAL 2 TIMES DAILY
Status: DISCONTINUED | OUTPATIENT
Start: 2019-10-12 | End: 2019-10-19 | Stop reason: HOSPADM

## 2019-10-12 RX ORDER — LEVOTHYROXINE SODIUM 112 UG/1
112 TABLET ORAL DAILY
Status: DISCONTINUED | OUTPATIENT
Start: 2019-10-12 | End: 2019-10-19 | Stop reason: HOSPADM

## 2019-10-12 RX ADMIN — GABAPENTIN 300 MG: 300 CAPSULE ORAL at 16:23

## 2019-10-12 RX ADMIN — SERTRALINE HYDROCHLORIDE 25 MG: 25 TABLET ORAL at 21:37

## 2019-10-12 RX ADMIN — METOPROLOL TARTRATE 25 MG: 25 TABLET, FILM COATED ORAL at 17:29

## 2019-10-12 RX ADMIN — HEPARIN SODIUM 5000 UNITS: 5000 INJECTION INTRAVENOUS; SUBCUTANEOUS at 21:37

## 2019-10-12 RX ADMIN — LEVETIRACETAM 500 MG: 500 TABLET, FILM COATED ORAL at 17:29

## 2019-10-12 RX ADMIN — ALBUTEROL SULFATE 2 PUFF: 90 AEROSOL, METERED RESPIRATORY (INHALATION) at 23:04

## 2019-10-12 RX ADMIN — METHOCARBAMOL TABLETS 500 MG: 500 TABLET, COATED ORAL at 16:23

## 2019-10-12 RX ADMIN — LORAZEPAM 0.5 MG: 0.5 TABLET ORAL at 23:17

## 2019-10-12 RX ADMIN — GABAPENTIN 300 MG: 300 CAPSULE ORAL at 21:37

## 2019-10-12 NOTE — ED ATTENDING ATTESTATION
10/12/2019  I, Elda Doe MD, saw and evaluated the patient  I have discussed the patient with the resident/non-physician practitioner and agree with the resident's/non-physician practitioner's findings, Plan of Care, and MDM as documented in the resident's/non-physician practitioner's note, except where noted  All available labs and Radiology studies were reviewed  I was present for key portions of any procedure(s) performed by the resident/non-physician practitioner and I was immediately available to provide assistance  At this point I agree with the current assessment done in the Emergency Department  I have conducted an independent evaluation of this patient a history and physical is as follows:   Fall   Recent sdh     D/c 2 days ago to home with home PT and OT     No surgical intervention   [place] today   While using walker  One step      No fever  No vomiting no HA     No C/O   Exam nad  vss   HEENT no ext evidence of head trauma    Neck nt   Lungs clear heart rrr  abd soft nt  Ext edema   Old brusing left shoulder area   Imp   Fall      ED Course         Critical Care Time  Procedures

## 2019-10-12 NOTE — PLAN OF CARE
Problem: Potential for Falls  Goal: Patient will remain free of falls  Description  INTERVENTIONS:  - Assess patient frequently for physical needs  -  Identify cognitive and physical deficits and behaviors that affect risk of falls    -  [place] fall precautions as indicated by assessment   - Educate patient/family on patient safety including physical limitations  - Instruct patient to call for assistance with activity based on assessment  - Modify environment to reduce risk of injury  - Consider OT/PT consult to assist with strengthening/mobility  10/12/2019 1648 by Darvin Edouard RN  Outcome: Progressing  10/12/2019 1648 by Darvin Edouard RN  Outcome: Progressing     Problem: PAIN - ADULT  Goal: Verbalizes/displays adequate comfort level or baseline comfort level  Description  Interventions:  - Encourage patient to monitor pain and request assistance  - Assess pain using appropriate pain scale  - Administer analgesics based on type and severity of pain and evaluate response  - Implement non-pharmacological measures as appropriate and evaluate response  - Consider cultural and social influences on pain and pain management  - Notify physician/advanced practitioner if interventions unsuccessful or patient reports new pain  Outcome: Progressing     Problem: INFECTION - ADULT  Goal: Absence or prevention of progression during hospitalization  Description  INTERVENTIONS:  - Assess and monitor for signs and symptoms of infection  - Monitor lab/diagnostic results  - Monitor all insertion sites, i e  indwelling lines, tubes, and drains  - Monitor endotracheal if appropriate and nasal secretions for changes in amount and color  - [place] appropriate cooling/warming therapies per order  - Administer medications as ordered  - Instruct and encourage patient and family to use good hand hygiene technique  - Identify and instruct in appropriate isolation precautions for identified infection/condition  Outcome: Progressing  Goal: Absence of fever/infection during neutropenic period  Description  INTERVENTIONS:  - Monitor WBC    Outcome: Progressing     Problem: SAFETY ADULT  Goal: Maintain or return to baseline ADL function  Description  INTERVENTIONS:  -  Assess patient's ability to carry out ADLs; assess patient's baseline for ADL function and identify physical deficits which impact ability to perform ADLs (bathing, care of mouth/teeth, toileting, grooming, dressing, etc )  - Assess/evaluate cause of self-care deficits   - Assess range of motion  - Assess patient's mobility; develop plan if impaired  - Assess patient's need for assistive devices and provide as appropriate  - Encourage maximum independence but intervene and supervise when necessary  - Involve family in performance of ADLs  - Assess for home care needs following discharge   - Consider OT consult to assist with ADL evaluation and planning for discharge  - Provide patient education as appropriate  Outcome: Progressing  Goal: Maintain or return mobility status to optimal level  Description  INTERVENTIONS:  - Assess patient's baseline mobility status (ambulation, transfers, stairs, etc )    - Identify cognitive and physical deficits and behaviors that affect mobility  - Identify mobility aids required to assist with transfers and/or ambulation (gait belt, sit-to-stand, lift, walker, cane, etc )  - [place] fall precautions as indicated by assessment  - Record patient progress and toleration of activity level on Mobility SBAR; progress patient to next Phase/Stage  - Instruct patient to call for assistance with activity based on assessment  - Consider rehabilitation consult to assist with strengthening/weightbearing, etc   Outcome: Progressing     Problem: DISCHARGE PLANNING  Goal: Discharge to home or other facility with appropriate resources  Description  INTERVENTIONS:  - Identify barriers to discharge w/patient and caregiver  - Arrange for needed discharge resources and transportation as appropriate  - Identify discharge learning needs (meds, wound care, etc )  - Arrange for interpretive services to assist at discharge as needed  - Refer to Case Management Department for coordinating discharge planning if the patient needs post-hospital services based on physician/advanced practitioner order or complex needs related to functional status, cognitive ability, or social support system  Outcome: Progressing

## 2019-10-12 NOTE — ED PROVIDER NOTES
History  Chief Complaint   Patient presents with   Grace Baez witnessed fall/assist to the ground  Pt d/c on [facility] for a fall as well  Pt c/o chronic back pain and left sided muscle discomfort from previous fall      80-year-old woman with a past medical history of CHF, COPD, recent traumatic subdural hematoma presents for evaluation of fall  Patient was admitted from today to 10 10 to Trauma service here following a fall at [address] while she was there for routine outpatient visit  Patient was on Plavix that was reversed with DDAVP  She was admitted evaluated by Neurosurgery, PT/OT  Patient did well was cleared for home PT and OT  She lives with her daughter who helps with her care  Her daughter states she has been more weak since going home  She had a fall today while attempting neck ascend 1 stair  Patient is visiting nurse was there at the time and helped her to the ground  She states that her legs felt weak when she tried to go up the stairs  She did not strike her head  No LOC  No nausea, vomiting  Denies numbness, weakness, tingling in her extremities  She has no complaints  Her daughter is present states she is acting neurologic baseline  Her GCS was 15 upon arrival here  Prior to Admission Medications   Prescriptions Last Dose Informant Patient Reported? Taking?    HYDROcodone-acetaminophen (NORCO) 5-325 mg per tablet   Yes Yes   Sig: Take 1 tablet by mouth every 6 (six) hours as needed for pain   LORazepam (ATIVAN) 0 5 mg tablet   Yes Yes   Sig: Take by mouth every 8 (eight) hours as needed for anxiety   Multiple Vitamins-Calcium (MULTI-DAY/CALCIUM/EXTRA IRON PO)  Self Yes Yes   Sig: Take 1 tablet by mouth daily   Multiple Vitamins-Minerals (CENTRUM ADULTS PO)  Self Yes No   Sig: Centrum   Omega-3 Fatty Acids (FISH OIL) 1,000 mg  Self Yes Yes   Sig: Fish Oil 1,000 mg capsule   Red Yeast Rice Extract (RED YEAST RICE PO)   Yes Yes   Sig: Take 600 mg by mouth daily   acetaminophen (TYLENOL) 325 mg tablet  Self No No   Sig: Take 2 tablets (650 mg total) by mouth every 6 (six) hours as needed for mild pain   Patient taking differently: Take 1,000 mg by mouth 3 (three) times a day as needed for mild pain    albuterol (PROVENTIL HFA,VENTOLIN HFA) 90 mcg/act inhaler  Self Yes Yes   Sig: Inhale 2 puffs every 4 (four) hours as needed for wheezing   amLODIPine (NORVASC) 5 mg tablet  Self Yes Yes   Sig: Take 5 mg by mouth every morning    calcium carbonate-vitamin D (OSCAL-D) 500 mg-200 units per tablet  Self Yes Yes   Sig: Take 1 tablet by mouth 2 (two) times a day with meals     docusate sodium (COLACE) 100 mg capsule   No No   Sig: Take 1 capsule (100 mg total) by mouth 2 (two) times a day as needed for constipation   gabapentin (NEURONTIN) 300 mg capsule  Self No Yes   Sig: Take 2 capsules PO three times a day   levETIRAcetam (KEPPRA) 500 mg tablet   No Yes   Sig: Take 1 tablet (500 mg total) by mouth 2 (two) times a day for 5 days   levothyroxine 112 mcg tablet   Yes Yes   Sig: Take 112 mcg by mouth daily    lidocaine (LIDODERM) 5 %  Self Yes Yes   Sig: Apply 2 patches topically daily Remove & Discard patch within 12 hours or as directed by MD     methocarbamol (ROBAXIN) 500 mg tablet  Self No Yes   Sig: Take 1 tablet (500 mg total) by mouth 3 (three) times a day as needed for muscle spasms   metoprolol tartrate (LOPRESSOR) 25 mg tablet  Self Yes Yes   Sig: Take 25 mg by mouth 2 (two) times a day    nitroglycerin (NITROSTAT) 0 4 mg SL tablet  Self Yes No   Sig: Place under the tongue every 5 (five) minutes as needed for chest pain    nystatin (MYCOSTATIN) powder  Self Yes Yes   Sig: Apply topically as needed    sertraline (ZOLOFT) 25 mg tablet  Self Yes Yes   Si mg daily at bedtime       Facility-Administered Medications: None       Past Medical History:   Diagnosis Date    Arthritis     Breast cancer ([facility] [address] )     Cardiac disease     aortic valve transplant  CHF (congestive heart failure) (HCC)     Compression fracture of body of thoracic vertebra (HCC)     [facility] vascular [facility] [address] [facility]     Diabetes mellitus ([facility] [address] )     Disease of thyroid gland     Fibromyalgia, primary     H/O cervical fracture 01/09/2019    Hyperlipidemia     Hypertension     Neuropathy     Pressure injury of skin     Renal disorder     kidney stent    Stroke [facility])        Past Surgical History:   Procedure Laterality Date    AORTIC VALVE SURGERY      BREAST LUMPECTOMY Right     BREAST LUMPECTOMY Left     BREAST SURGERY      lumpectomy for breast cancer    CATARACT EXTRACTION      CHOLECYSTECTOMY      HYSTERECTOMY  1978    KIDNEY SURGERY      stent placed for kidney    OTHER SURGICAL HISTORY      abdominal aneurysm surgery    [place] ARTHRODESIS POSTERIOR ATLAS-AXIS C1-C2 N/A 5/1/2019    Procedure: C1-C2 lateral mass fixation fusion with possible sublaminar cables;  Surgeon: Kelly Feliz MD;  [facility];  Service: Neurosurgery    REPLACEMENT TOTAL KNEE      left        Family History   Problem Relation Age of Onset    Heart disease Mother     Arthritis Mother     Diabetes Mother     Heart failure Father     Arthritis Father     Other Father         enlargement of prostate     Dementia Father     No Known Problems Daughter     No Known Problems Maternal Grandmother     No Known Problems Maternal Grandfather     No Known Problems Paternal Grandmother     No Known Problems Paternal Grandfather     No Known Problems Maternal Aunt     No Known Problems Maternal Aunt     No Known Problems Maternal Aunt     No Known Problems Maternal Aunt     No Known Problems Paternal Aunt     No Known Problems Paternal Aunt      I have reviewed and agree with the history as documented      Social History     Tobacco Use    Smoking status: Never Smoker    Smokeless tobacco: Never Used   Substance Use Topics    Alcohol use: Not Currently     Frequency: Never Binge frequency: Never    Drug use: No        Review of Systems   Constitutional: Negative for appetite change, chills, diaphoresis, fatigue and fever  HENT: Negative for congestion, rhinorrhea and sore throat  Respiratory: Negative for cough, shortness of breath, wheezing and stridor  Cardiovascular: Negative for chest pain, palpitations and leg swelling  Gastrointestinal: Negative for abdominal distention, abdominal pain, constipation, diarrhea, nausea and vomiting  Endocrine: Negative for polydipsia and polyuria  Genitourinary: Negative for dysuria and hematuria  Musculoskeletal: Negative for back pain, neck pain and neck stiffness  Skin: Negative for pallor, rash and wound  Neurological: Positive for weakness  Negative for dizziness, light-headedness and headaches  Psychiatric/Behavioral: Negative for behavioral problems and confusion  Physical Exam  ED Triage Vitals   Temperature Pulse Respirations Blood Pressure SpO2   10/12/19 1112 10/12/19 1112 10/12/19 1112 10/12/19 1112 10/12/19 1112   97 5 °F (36 4 °C) 62 22 135/66 94 %      Temp Source Heart Rate Source Patient Position - Orthostatic VS BP Location FiO2 (%)   10/12/19 1112 10/12/19 1112 10/12/19 1112 10/12/19 1112 --   Oral Monitor Sitting Left arm       Pain Score       10/12/19 1351       4             Orthostatic Vital Signs  Vitals:    10/12/19 1300 10/12/19 1351 10/12/19 1605 10/12/19 2300   BP: 142/62 142/62 147/58 142/57   Pulse: 62 59 65 64   Patient Position - Orthostatic VS:  Sitting  Lying       Physical Exam   Constitutional: She is oriented to person, place, and time  She appears well-developed and well-nourished  No distress  HENT:   Head: Normocephalic and atraumatic  Eyes: Conjunctivae are normal  No scleral icterus  Neck: Normal range of motion  Neck supple  Cardiovascular: Normal rate, regular rhythm, normal heart sounds and intact distal pulses  No murmur heard    Pulmonary/Chest: Effort normal and breath sounds normal  No respiratory distress  She has no wheezes  Abdominal: Soft  Bowel sounds are normal  She exhibits no distension  There is no tenderness  Musculoskeletal: Normal range of motion  She exhibits no edema, tenderness or deformity  Neurological: She is alert and oriented to person, place, and time  She has normal strength  No cranial nerve deficit or sensory deficit  She exhibits normal muscle tone  GCS eye subscore is 4  GCS verbal subscore is 5  GCS motor subscore is 6  Skin: Skin is warm and dry  No rash noted  She is not diaphoretic  No erythema  No pallor  Psychiatric: She has a normal mood and affect  Her behavior is normal    Nursing note and vitals reviewed        ED Medications  Medications   heparin (porcine) subcutaneous injection 5,000 Units (5,000 Units Subcutaneous Given 10/13/19 0525)   ondansetron (ZOFRAN) injection 4 mg (has no administration in time range)   albuterol (PROVENTIL HFA,VENTOLIN HFA) inhaler 2 puff (2 puffs Inhalation Given 10/12/19 2304)   amLODIPine (NORVASC) tablet 5 mg (has no administration in time range)   docusate sodium (COLACE) capsule 100 mg (has no administration in time range)   gabapentin (NEURONTIN) capsule 300 mg (300 mg Oral Given 10/12/19 2137)   levETIRAcetam (KEPPRA) tablet 500 mg (500 mg Oral Given 10/12/19 1729)   levothyroxine tablet 112 mcg (112 mcg Oral Not Given 10/12/19 1530)   LORazepam (ATIVAN) tablet 0 5 mg (0 5 mg Oral Given 10/12/19 2317)   methocarbamol (ROBAXIN) tablet 500 mg (500 mg Oral Given 10/13/19 0104)   metoprolol tartrate (LOPRESSOR) tablet 25 mg (25 mg Oral Given 10/12/19 1729)   sertraline (ZOLOFT) tablet 25 mg (25 mg Oral Given 10/12/19 2137)       Diagnostic Studies  Results Reviewed     Procedure Component Value Units Date/Time    Basic metabolic panel [[id number]]  (Abnormal) Collected:  10/13/19 0500    Lab Status:  Final result Specimen:  Blood from Arm, Right Updated:  10/13/19 4969     Sodium 133 mmol/L Potassium 4 2 mmol/L      Chloride 101 mmol/L      CO2 24 mmol/L      ANION GAP 8 mmol/L      BUN 39 mg/dL      Creatinine 1 57 mg/dL      Glucose 90 mg/dL      Calcium 9 2 mg/dL      eGFR 30 ml/min/1 73sq m     Narrative:       [place] guidelines for Chronic Kidney Disease (CKD):     Stage 1 with normal or high GFR (GFR > 90 mL/min/1 73 square meters)    Stage 2 Mild CKD (GFR = 60-89 mL/min/1 73 square meters)    Stage 3A Moderate CKD (GFR = 45-59 mL/min/1 73 square meters)    Stage 3B Moderate CKD (GFR = 30-44 mL/min/1 73 square meters)    Stage 4 Severe CKD (GFR = 15-29 mL/min/1 73 square meters)    Stage 5 End Stage CKD (GFR <15 mL/min/1 73 square meters)  Note: GFR calculation is accurate only with a steady state creatinine    Platelet count [[id number]]     Lab Status:  No result Specimen:  Blood     Basic metabolic panel [[id number]]  (Abnormal) Collected:  10/12/19 1220    Lab Status:  Final result Specimen:  Blood from Arm, Right Updated:  10/12/19 1245     Sodium 130 mmol/L      Potassium 4 3 mmol/L      Chloride 93 mmol/L      CO2 29 mmol/L      ANION GAP 8 mmol/L      BUN 43 mg/dL      Creatinine 1 87 mg/dL      Glucose 101 mg/dL      Calcium 10 3 mg/dL      eGFR 24 ml/min/1 73sq m     Narrative:       [place] guidelines for Chronic Kidney Disease (CKD):     Stage 1 with normal or high GFR (GFR > 90 mL/min/1 73 square meters)    Stage 2 Mild CKD (GFR = 60-89 mL/min/1 73 square meters)    Stage 3A Moderate CKD (GFR = 45-59 mL/min/1 73 square meters)    Stage 3B Moderate CKD (GFR = 30-44 mL/min/1 73 square meters)    Stage 4 Severe CKD (GFR = 15-29 mL/min/1 73 square meters)    Stage 5 End Stage CKD (GFR <15 mL/min/1 73 square meters)  Note: GFR calculation is accurate only with a steady state creatinine                 CT head without contrast   Final Result by Tessa Mao MD (10/12 3201)      Right parafalcine extra-axial hemorrhage, stable   No new extra-axial hemorrhage   No mass effect or midline shift   Chronic small vessel ischemic changes               Workstation performed: [id number]               Procedures  Procedures        ED Course  ED Course as of Oct 13 0717   Sat Oct 12, 2019   1419 Discussed with Dr Salma Li  He will admit to the trauma service for PT/OT, case management  Identification of Seniors at Risk      Most Recent Value   (ISAR) Identification of Seniors at Risk   Before the illness or injury that brought you to the Emergency, did you need someone to help you on a regular basis? 0 Filed at: 10/12/2019 1115   In the last 24 hours, have you needed more help than usual?  0 Filed at: 10/12/2019 1115   Have you been hospitalized for one or more nights during the past 6 months? 1 Filed at: 10/12/2019 1115   In general, do you see well? 1 Filed at: 10/12/2019 1115   In general, do you have serious problems with your memory? 1 Filed at: 10/12/2019 1115   Do you take more than three different medications every day? 1 Filed at: 10/12/2019 1115   ISAR Score  4 Filed at: 10/12/2019 1115                          MDM  Number of Diagnoses or Management Options  Fall from standing: new and requires workup  Diagnosis management comments: A 11year-old woman recently discharged from Trauma service for traumatic subdural presents with fall  Patient was feeling weak all ambulating with a walker and was helped to the ground by a visiting nurse  Patient was cleared by home PT/OT  Concerned the patient require more intensive physical therapy before being cleared to go home  Will recheck CT head and BMP  Workup normal   Creatinine stable  Will discuss with trauma team regarding admission for short-term rehab placement, PT/OT         Amount and/or Complexity of Data Reviewed  Clinical lab tests: ordered and reviewed  Tests in the radiology section of CPT®: ordered and reviewed  Decide to obtain previous medical records or to obtain history from someone other than the patient: yes  Obtain history from someone other than the patient: yes  Review and summarize past medical records: yes  Discuss the patient with other providers: yes  Independent visualization of images, tracings, or specimens: yes    Risk of Complications, Morbidity, and/or Mortality  Presenting problems: low  Diagnostic procedures: low  Management options: low    Patient Progress  Patient progress: stable      Disposition  Final diagnoses:   Fall from standing     Time reflects when diagnosis was documented in both MDM as applicable and the Disposition within this note     Time User Action Codes Description Comment    10/13/2019  7:16 AM Kerry Alvarez [[id number]  [id number]] Fall from standing       ED Disposition     ED Disposition Condition Date/Time Comment    Admit Stable Sun Oct 13, 2019  7:17 AM Case was discussed with Trauma and the patient's admission status was agreed to be Admission Status: observation status to the service of Dr Edgar Ramos          Follow-up Information    None         Current Discharge Medication List      CONTINUE these medications which have NOT CHANGED    Details   albuterol (PROVENTIL HFA,VENTOLIN HFA) 90 mcg/act inhaler Inhale 2 puffs every 4 (four) hours as needed for wheezing      amLODIPine (NORVASC) 5 mg tablet Take 5 mg by mouth every morning       calcium carbonate-vitamin D (OSCAL-D) 500 mg-200 units per tablet Take 1 tablet by mouth 2 (two) times a day with meals        gabapentin (NEURONTIN) 300 mg capsule Take 2 capsules PO three times a day  Qty: 60 capsule, Refills: 0    Associated Diagnoses: Neuropathy      HYDROcodone-acetaminophen (NORCO) 5-325 mg per tablet Take 1 tablet by mouth every 6 (six) hours as needed for pain      levETIRAcetam (KEPPRA) 500 mg tablet Take 1 tablet (500 mg total) by mouth 2 (two) times a day for 5 days  Qty: 10 tablet, Refills: 0    Associated Diagnoses: Traumatic subdural hematoma without loss of consciousness, initial encounter ([place] [address] )      levothyroxine 112 mcg tablet Take 112 mcg by mouth daily       lidocaine (LIDODERM) 5 % Apply 2 patches topically daily Remove & Discard patch within 12 hours or as directed by MD        LORazepam (ATIVAN) 0 5 mg tablet Take by mouth every 8 (eight) hours as needed for anxiety      methocarbamol (ROBAXIN) 500 mg tablet Take 1 tablet (500 mg total) by mouth 3 (three) times a day as needed for muscle spasms  Qty: 90 tablet, Refills: 0    Associated Diagnoses: Closed odontoid fracture with type III morphology, initial encounter ([place] [address] ); Closed nondisplaced fracture of sixth cervical vertebra with routine healing, unspecified fracture morphology, subsequent encounter; Pain      metoprolol tartrate (LOPRESSOR) 25 mg tablet Take 25 mg by mouth 2 (two) times a day       Multiple Vitamins-Calcium (MULTI-DAY/CALCIUM/EXTRA IRON PO) Take 1 tablet by mouth daily      nystatin (MYCOSTATIN) powder Apply topically as needed       Omega-3 Fatty Acids (FISH OIL) 1,000 mg Fish Oil 1,000 mg capsule      Red Yeast Rice Extract (RED YEAST RICE PO) Take 600 mg by mouth daily      sertraline (ZOLOFT) 25 mg tablet 25 mg daily at bedtime       acetaminophen (TYLENOL) 325 mg tablet Take 2 tablets (650 mg total) by mouth every 6 (six) hours as needed for mild pain  Qty: 30 tablet, Refills: 0    Associated Diagnoses: Post-operative state      docusate sodium (COLACE) 100 mg capsule Take 1 capsule (100 mg total) by mouth 2 (two) times a day as needed for constipation  Qty: 10 capsule, Refills: 0    Associated Diagnoses: Traumatic subdural hematoma without loss of consciousness, initial encounter ([facility])      Multiple Vitamins-Minerals (CENTRUM ADULTS PO) Centrum      nitroglycerin (NITROSTAT) 0 4 mg SL tablet Place under the tongue every 5 (five) minutes as needed for chest pain            No discharge procedures on file  ED Provider  Attending physically available and evaluated Enoch Ogden I managed the patient along with the ED Attending      Electronically Signed by         Pino Townsend MD  10/13/19 6942       Pino Townsend MD  10/13/19 1669

## 2019-10-13 LAB
ANION GAP SERPL CALCULATED.3IONS-SCNC: 8 MMOL/L (ref 4–13)
BASOPHILS # BLD AUTO: 0.04 THOUSANDS/ΜL (ref 0–0.1)
BASOPHILS NFR BLD AUTO: 1 % (ref 0–1)
BUN SERPL-MCNC: 39 MG/DL (ref 5–25)
CALCIUM SERPL-MCNC: 9.2 MG/DL (ref 8.3–10.1)
CHLORIDE SERPL-SCNC: 101 MMOL/L (ref 100–108)
CO2 SERPL-SCNC: 24 MMOL/L (ref 21–32)
CREAT SERPL-MCNC: 1.57 MG/DL (ref 0.6–1.3)
EOSINOPHIL # BLD AUTO: 0.04 THOUSAND/ΜL (ref 0–0.61)
EOSINOPHIL NFR BLD AUTO: 1 % (ref 0–6)
ERYTHROCYTE [DISTWIDTH] IN BLOOD BY AUTOMATED COUNT: 14.2 % (ref 11.6–15.1)
GFR SERPL CREATININE-BSD FRML MDRD: 30 ML/MIN/1.73SQ M
GLUCOSE SERPL-MCNC: 90 MG/DL (ref 65–140)
HCT VFR BLD AUTO: 28.7 % (ref 34.8–46.1)
HGB BLD-MCNC: 9.3 G/DL (ref 11.5–15.4)
IMM GRANULOCYTES # BLD AUTO: 0.01 THOUSAND/UL (ref 0–0.2)
IMM GRANULOCYTES NFR BLD AUTO: 0 % (ref 0–2)
LYMPHOCYTES # BLD AUTO: 1.3 THOUSANDS/ΜL (ref 0.6–4.47)
LYMPHOCYTES NFR BLD AUTO: 24 % (ref 14–44)
MCH RBC QN AUTO: 33.2 PG (ref 26.8–34.3)
MCHC RBC AUTO-ENTMCNC: 32.4 G/DL (ref 31.4–37.4)
MCV RBC AUTO: 103 FL (ref 82–98)
MONOCYTES # BLD AUTO: 0.38 THOUSAND/ΜL (ref 0.17–1.22)
MONOCYTES NFR BLD AUTO: 7 % (ref 4–12)
NEUTROPHILS # BLD AUTO: 3.76 THOUSANDS/ΜL (ref 1.85–7.62)
NEUTS SEG NFR BLD AUTO: 67 % (ref 43–75)
NRBC BLD AUTO-RTO: 0 /100 WBCS
PLATELET # BLD AUTO: 165 THOUSANDS/UL (ref 149–390)
PMV BLD AUTO: 9.4 FL (ref 8.9–12.7)
POTASSIUM SERPL-SCNC: 4.2 MMOL/L (ref 3.5–5.3)
RBC # BLD AUTO: 2.8 MILLION/UL (ref 3.81–5.12)
SODIUM SERPL-SCNC: 133 MMOL/L (ref 136–145)
WBC # BLD AUTO: 5.53 THOUSAND/UL (ref 4.31–10.16)

## 2019-10-13 PROCEDURE — 99222 1ST HOSP IP/OBS MODERATE 55: CPT | Performed by: SURGERY

## 2019-10-13 PROCEDURE — G8988 SELF CARE GOAL STATUS: HCPCS

## 2019-10-13 PROCEDURE — 97167 OT EVAL HIGH COMPLEX 60 MIN: CPT

## 2019-10-13 PROCEDURE — G8979 MOBILITY GOAL STATUS: HCPCS

## 2019-10-13 PROCEDURE — 97163 PT EVAL HIGH COMPLEX 45 MIN: CPT

## 2019-10-13 PROCEDURE — G8978 MOBILITY CURRENT STATUS: HCPCS

## 2019-10-13 PROCEDURE — 80048 BASIC METABOLIC PNL TOTAL CA: CPT | Performed by: STUDENT IN AN ORGANIZED HEALTH CARE EDUCATION/TRAINING PROGRAM

## 2019-10-13 PROCEDURE — NC001 PR NO CHARGE: Performed by: SURGERY

## 2019-10-13 PROCEDURE — 85025 COMPLETE CBC W/AUTO DIFF WBC: CPT | Performed by: PHYSICIAN ASSISTANT

## 2019-10-13 PROCEDURE — G8987 SELF CARE CURRENT STATUS: HCPCS

## 2019-10-13 RX ADMIN — HEPARIN SODIUM 5000 UNITS: 5000 INJECTION INTRAVENOUS; SUBCUTANEOUS at 21:56

## 2019-10-13 RX ADMIN — GABAPENTIN 300 MG: 300 CAPSULE ORAL at 17:16

## 2019-10-13 RX ADMIN — METOPROLOL TARTRATE 25 MG: 25 TABLET, FILM COATED ORAL at 08:00

## 2019-10-13 RX ADMIN — AMLODIPINE BESYLATE 5 MG: 5 TABLET ORAL at 08:00

## 2019-10-13 RX ADMIN — LEVETIRACETAM 500 MG: 500 TABLET, FILM COATED ORAL at 08:00

## 2019-10-13 RX ADMIN — METHOCARBAMOL TABLETS 500 MG: 500 TABLET, COATED ORAL at 01:04

## 2019-10-13 RX ADMIN — GABAPENTIN 300 MG: 300 CAPSULE ORAL at 08:00

## 2019-10-13 RX ADMIN — SERTRALINE HYDROCHLORIDE 25 MG: 25 TABLET ORAL at 21:56

## 2019-10-13 RX ADMIN — METHOCARBAMOL TABLETS 500 MG: 500 TABLET, COATED ORAL at 08:00

## 2019-10-13 RX ADMIN — LEVETIRACETAM 500 MG: 500 TABLET, FILM COATED ORAL at 17:16

## 2019-10-13 RX ADMIN — METHOCARBAMOL TABLETS 500 MG: 500 TABLET, COATED ORAL at 21:56

## 2019-10-13 RX ADMIN — METHOCARBAMOL TABLETS 500 MG: 500 TABLET, COATED ORAL at 17:15

## 2019-10-13 RX ADMIN — HEPARIN SODIUM 5000 UNITS: 5000 INJECTION INTRAVENOUS; SUBCUTANEOUS at 13:33

## 2019-10-13 RX ADMIN — GABAPENTIN 300 MG: 300 CAPSULE ORAL at 21:56

## 2019-10-13 RX ADMIN — HEPARIN SODIUM 5000 UNITS: 5000 INJECTION INTRAVENOUS; SUBCUTANEOUS at 05:25

## 2019-10-13 RX ADMIN — METOPROLOL TARTRATE 25 MG: 25 TABLET, FILM COATED ORAL at 17:16

## 2019-10-13 RX ADMIN — LEVOTHYROXINE SODIUM 112 MCG: 112 TABLET ORAL at 08:00

## 2019-10-13 NOTE — PLAN OF CARE
Problem: PHYSICAL THERAPY ADULT  Goal: Performs mobility at highest level of function for planned discharge setting  See evaluation for individualized goals  Description  Treatment/Interventions: Functional transfer training, LE strengthening/ROM, Therapeutic exercise, Gait training, Bed mobility, Endurance training  Equipment Recommended: Jas Nolan       See flowsheet documentation for full assessment, interventions and recommendations  Note:   Prognosis: Fair  Problem List: Decreased strength, Decreased endurance, Impaired balance, Decreased mobility, Decreased cognition, Pain  Assessment: Pt is a 79 y/o female who is seen for high complexity PT evaluation after being admitted for a recent fall  Pt was recently D/C from facility after a fall in which she suffered an SDH  Pt was D/C'd 3 days ago w/home PT/OT  Pt was re-admitted yesterday after falling in her home while attempting to ascend 1 stair  Pt co-morbidities/PMHx consist of CKD III, depression, TIA, UTI, HTN, DM, hypothyroidism, hx of breast cancer, CVA, OP, RA, diabetic polyneuropathy, C/S fractures, fibromyalgia, HERLINDA, and dysphagia  Pt currently lives w/her daughter and her family in a 1 story house  Pt enters the home through the back of the house with a ramp  PTA, pt states that she was (I) w/all ADLs and IADLs, except for driving and cooking at times  Pt ambulates with a RW regularly  Upon evaluation, pt reported that her legs still felt weak  During strength assessment, there was more weakness in the (R) LE compared to the (L) LE  Pt believes that it is residual from her past stroke  Pt required min A x 2 for transfers and min A x 1 w/chair follow during ambulation w/RW  Pt was only able to ambulate 15' before stopping due to fatigue  Recommend that pt receive skilled PT until medically cleared for D/C  Once medically cleared, recommend be D/C to rehab          Recommendation: Post acute IP rehab     PT - OK to Discharge: Yes    See flowsheet documentation for full assessment

## 2019-10-13 NOTE — PLAN OF CARE
Problem: OCCUPATIONAL THERAPY ADULT  Goal: Performs self-care activities at highest level of function for planned discharge setting  See evaluation for individualized goals  Description  Treatment Interventions: ADL retraining, Functional transfer training, Endurance training, Patient/family training, Cognitive reorientation, Equipment evaluation/education, Compensatory technique education, Activityengagement          See flowsheet documentation for full assessment, interventions and recommendations  Note:   Limitation: Decreased ADL status, Decreased Safe judgement during ADL, Decreased endurance, Decreased self-care trans  Prognosis: Good, Fair  Assessment: Pt is a 80 y o  female who was admitted to [facility] on 10/12/2019 with Ambulatory dysfunction s/p fall (lowered to ground while attempting to step up a step with home care) - pt recently admitted to [facility] with SDH s/p fall and d/c home with home services 10/10  Pt's problem list also includes PMH of DM, HTN, underlying neurological disorder, previous surgery, cancer history, neuropathy and arthritis, breast ca, aortic valve replacement, CHF, compression fx T spine, CVA, thryoid disease, fibromyalgia, h/o cervical fx, hld, renal disorder with kidney stent  At baseline pt was completing adls independently and ambulating with RW - dtr assists with iadls -   Pt lives in apt on same property as dtr  Currently pt requires mod to max assist for overall ADLS and moderate assist for functional mobility/transfers  Pt currently presents with impairments in the following categories -limited home support, difficulty performing ADLS, difficulty performing IADLS , decreased initiation and engagement  and health management  activity tolerance, endurance, standing balance/tolerance, memory, safety , judgement  and attention    These impairments, as well as pt's fatigue, pain, decreased caregiver support and risk for falls  limit pt's ability to safely engage in all baseline areas of occupation, includingbathing, dressing, toileting, functional mobility/transfers, community mobility, house maintenance, social participation  and leisure activities  From OT standpoint, recommend inpt rehab  upon D/C  OT will continue to follow to address the below stated goals        OT Discharge Recommendation: Short Term Rehab

## 2019-10-13 NOTE — PROGRESS NOTES
Progress Note - Elian Ward 1934, 80 y o  female MRN: [id number]    Unit/Bed#: [facility] 258-56 Encounter: [id number]    Primary Care Provider: Zakia Schaffer DO   Date and time admitted to hospital: 10/12/2019 11:07 AM        Traumatic subdural hematoma without loss of consciousness ([facility] [address] )  Assessment & Plan  - recently discharged 10/10/19  - no new hemorrhage on head CT 10/12/19    Acute kidney injury [facility])  Assessment & Plan  - improving with Creatinine at/near baseline    Ambulatory dysfunction  Assessment & Plan  - PT/OT  - Gerontology consult  - rehab            Bedside nurse rounds completed with nurse   Prophylaxis: Heparin    Disposition: rehab    Code status:  Level 1 - Full Code    Consultants: Gerontology    Is the patient 72 years or older?: YES:    1  Before the illness or injury that brought you to the Emergency, did you need someone to help you on a regular basis? 1=Yes   2  Since the illness or injury that brought you to the Emergency, have you needed more help than usual to take care of yourself? 1=Yes   3  Have you been hospitalized for one or more nights during the past 6 months (excluding a stay in the Emergency Department)? 1=Yes   4  In general, do you see well? 0=Yes   5  In general, do you have serious problems with your memory? 0=No   6  Do you take more than three different medications everyday? 1=Yes   TOTAL   4     Did you order a geriatric consult if the score was 2 or greater?: yes    SUBJECTIVE:     Transfer from: n/a  Outside Films Received: not applicable  Tertiary Exam Due on: today    Mechanism of Injury:Fall    Details related to Injury: +LOC:  no    Chief Complaint: sore back    HPI/Last 24 hour events: A bit weak  Mild paraspinal back soreness      Active medications:           Current Facility-Administered Medications:     albuterol (PROVENTIL HFA,VENTOLIN HFA) inhaler 2 puff, 2 puff, Inhalation, Q4H PRN, 2 puff at 10/12/19 5978    amLODIPine (NORVASC) tablet 5 mg, 5 mg, Oral, QAM, 5 mg at 10/13/19 0800    docusate sodium (COLACE) capsule 100 mg, 100 mg, Oral, BID PRN    gabapentin (NEURONTIN) capsule 300 mg, 300 mg, Oral, TID, 300 mg at 10/13/19 0800    heparin (porcine) subcutaneous injection 5,000 Units, 5,000 Units, Subcutaneous, Q8H [address], 5,000 Units at 10/13/19 0525 **AND** Platelet count, , , Once    levETIRAcetam (KEPPRA) tablet 500 mg, 500 mg, Oral, BID, 500 mg at 10/13/19 0800    levothyroxine tablet 112 mcg, 112 mcg, Oral, Daily, 112 mcg at 10/13/19 0800    LORazepam (ATIVAN) tablet 0 5 mg, 0 5 mg, Oral, Q8H PRN, 0 5 mg at 10/12/19 2317    methocarbamol (ROBAXIN) tablet 500 mg, 500 mg, Oral, TID PRN, 500 mg at 10/13/19 0800    metoprolol tartrate (LOPRESSOR) tablet 25 mg, 25 mg, Oral, BID, 25 mg at 10/13/19 0800    ondansetron (ZOFRAN) injection 4 mg, 4 mg, Intravenous, Q6H PRN    sertraline (ZOLOFT) tablet 25 mg, 25 mg, Oral, HS, 25 mg at 10/12/19 2137      OBJECTIVE:     Vitals:   Vitals:    10/13/19 0733   BP: 149/60   Pulse: 66   Resp: 20   Temp: 98 5 °F (36 9 °C)   SpO2: 98%       Physical Exam:   Constitution: patient lying in bed, appears comfortable  HEENT: SANDEEP, EOMI  CV: regular rate and rhythm, +2 DP pulses  Pulm: CTA, no wheezes, rhonchi or crackles, unlabored, equal bilaterally  Abd: soft, nontender, nondistended, active bowel sounds  Extremities: moves all extremities, equal strength, no edema, no skin breakdown  Neuro: A&O, no focal deficits  Skin: no rash or breakdown, warm            I/O:   I/O       10/11 0701 - 10/12 0700 10/12 0701 - 10/13 0700 10/13 0701 - 10/14 0700    P  O   100 360    Total Intake(mL/kg)  100 (1 1) 360 (4 1)    Urine (mL/kg/hr)  179 726 (1 3)    Total Output  179 726    Net  -79 -366           Unmeasured Urine Occurrence   1 x          Invasive Devices:    Invasive Devices     Drain            External Urinary Catheter 4 days                  Imaging:   Ct Head Without Contrast    Result Date: 10/12/2019  Impression: Right parafalcine extra-axial hemorrhage, stable No new extra-axial hemorrhage No mass effect or midline shift Chronic small vessel ischemic changes Workstation performed: [id number]       Labs:   CBC:   Lab Results   Component Value Date    WBC 5 53 10/13/2019    HGB 9 3 (L) 10/13/2019    HCT 28 7 (L) 10/13/2019     (H) 10/13/2019     10/13/2019    MCH 33 2 10/13/2019    MCHC 32 4 10/13/2019    RDW 14 2 10/13/2019    MPV 9 4 10/13/2019    NRBC 0 10/13/2019     CMP:   Lab Results   Component Value Date     10/13/2019    CO2 24 10/13/2019    BUN 39 (H) 10/13/2019    CREATININE 1 57 (H) 10/13/2019    CALCIUM 9 2 10/13/2019    EGFR 30 10/13/2019

## 2019-10-13 NOTE — UTILIZATION REVIEW
Initial Clinical Review    Admission: Date/Time/Statement: Inpatient Admission Orders (From admission, onward)     Ordered        10/12/19 1518  Inpatient Admission  Once                   Orders Placed This Encounter   Procedures    Inpatient Admission     Standing Status:   Standing     Number of Occurrences:   1     Order Specific Question:   Admitting Physician     Answer:   Hilaria Coleman     Order Specific Question:   Level of Care     Answer:   Med Surg [16]     Order Specific Question:   Bed Type     Answer:   Trauma [7]     Order Specific Question:   Estimated length of stay     Answer:   More than 2 Midnights     Order Specific Question:   Certification     Answer:   I certify that inpatient services are medically necessary for this patient for a duration of greater than two midnights  See H&P and MD Progress Notes for additional information about the patient's course of treatment  ED Arrival Information     Expected Arrival Acuity Means of Arrival Escorted By Service Admission Type    - 10/12/2019 11:06 Urgent Ambulance [facility] [place] operated by [facility] Trauma Urgent    Arrival Complaint    fall        Chief Complaint   Patient presents with   Julia Corral witnessed fall/assist to the ground  Pt d/c on [facility] Sees for a fall as well  Pt c/o chronic back pain and left sided muscle discomfort from previous fall      Assessment/Plan: 79 y/o female with h/o recent SDH who presents to ED with weakness s/p fall today at home  Pt states she was attempting to go the bathroom, became weak and unable to stand, and was assisted to the ground by her visiting nurse  Admit inpatient to M/S unit with Gait instability    PT/OT isaias lei with assist, reg diet    ED Triage Vitals   Temperature Pulse Respirations Blood Pressure SpO2   10/12/19 1112 10/12/19 1112 10/12/19 1112 10/12/19 1112 10/12/19 1112   97 5 °F (36 4 °C) 62 22 135/66 94 %      Temp Source Heart Rate Source Patient Position - Orthostatic VS BP Location FiO2 (%)   10/12/19 1112 10/12/19 1112 10/12/19 1112 10/12/19 1112 --   Oral Monitor Sitting Left arm       Pain Score       10/12/19 1351       4        Wt Readings from Last 1 Encounters:   10/13/19 88 5 kg (195 lb 3 2 oz)     Additional Vital Signs:   Date/Time  Temp  Pulse  Resp  BP  MAP (mmHg)  SpO2  O2 Device   10/13/19 07:33:42  98 5 °F (36 9 °C)  66  20  149/60  90  98 %     10/12/19 2300  99 5 °F (37 5 °C)  64  19  142/57    95 %  None (Room air)   10/12/19 2130            95 %     10/12/19 1700              None (Room air)   10/12/19 16:05:27  97 5 °F (36 4 °C)  65    147/58  88  90 %     10/12/19 1351    59  18  142/62    95 %  None (Room air)   10/12/19 1300    62    142/62  89  85 %Abnormal      10/12/19 1200    60    166/63  85  90 %     10/12/19 1112  97 5 °F (36 4 °C)  62  22  135/66    94 %  None (Room air)       Pertinent Labs/Diagnostic Test Results:   Results from last 7 days   Lab Units 10/13/19  0755 10/10/19  0526 10/09/19  0436 10/08/19  1708   WBC Thousand/uL 5 53 5 52 6 53 7 41   HEMOGLOBIN g/dL 9 3* 9 4* 9 3* 10 7*   HEMATOCRIT % 28 7* 29 4* 30 0* 34 6*   PLATELETS Thousands/uL 165 167 157 190   NEUTROS ABS Thousands/µL 3 76  --   --  5 39     Results from last 7 days   Lab Units 10/13/19  0507 10/12/19  1220 10/10/19  0526 10/08/19  2029 10/08/19  1708   SODIUM mmol/L 133* 130* 136 143 138   POTASSIUM mmol/L 4 2 4 3 3 9 3 4* 6 0*   CHLORIDE mmol/L 101 93* 97* 104 99*   CO2 mmol/L 24 29 31 31 31   ANION GAP mmol/L 8 8 8 8 8   BUN mg/dL 39* 43* 51* 60* 61*   CREATININE mg/dL 1 57* 1 87* 1 87* 2 44* 2 60*   EGFR ml/min/1 73sq m 30 24 24 18 16   CALCIUM mg/dL 9 2 10 3* 10 3* 9 8 10 4*     Results from last 7 days   Lab Units 10/13/19  0507 10/12/19  1220 10/10/19  0526 10/08/19  2029 10/08/19  1708   GLUCOSE RANDOM mg/dL 90 101 94 133 145*     Results from last 7 days   Lab Units 10/08/19  1708   PROTIME seconds 13 1   INR  1 05   PTT seconds 23     Results from last 7 days   Lab Units 10/09/19  1913   CLARITY UA  Clear   COLOR UA  Yellow   SPEC GRAV UA  1 014   PH UA  5 5   GLUCOSE UA mg/dl Negative   KETONES UA mg/dl Negative   BLOOD UA  Negative   PROTEIN UA mg/dl Negative   NITRITE UA  Negative   BILIRUBIN UA  Negative   UROBILINOGEN UA E U /dl 0 2   LEUKOCYTES UA  Trace*   WBC UA /hpf 2-4*   RBC UA /hpf None Seen   BACTERIA UA /hpf None Seen   EPITHELIAL CELLS WET PREP /hpf None Seen     ED Treatment:   Medication Administration from 10/12/2019 1106 to 10/12/2019 1559       Date/Time Order Dose Route Action     10/12/2019 1530 heparin (porcine) subcutaneous injection 5,000 Units 5,000 Units Subcutaneous Not Given     10/12/2019 1530 levothyroxine tablet 112 mcg 112 mcg Oral Not Given     Past Medical History:   Diagnosis Date    Arthritis     Breast cancer ([facility] [address] ) 2015    Cardiac disease     aortic valve transplant    CHF (congestive heart failure) ([facility] [address] )     Compression fracture of body of thoracic vertebra (HCC)     CVA (cerebral vascular accident) ([facility] [address] )     Diabetes mellitus ([facility] [address] )     Disease of thyroid gland     Fibromyalgia, primary     H/O cervical fracture 01/09/2019    Hyperlipidemia     Hypertension     Neuropathy     Pressure injury of skin     Renal disorder     kidney stent    Stroke [facility])      Present on Admission:   Acute kidney injury ([facility] [address] )   [facility] of [facility]   Ambulatory dysfunction      Admitting Diagnosis: Injury [T14 [id number]]  Age/Sex: 80 y o  female  Admission Orders:    Medications:  amLODIPine 5 mg Oral QAM   gabapentin 300 mg Oral TID   heparin (porcine) 5,000 Units Subcutaneous Q8H [address]   levETIRAcetam 500 mg Oral BID   levothyroxine 112 mcg Oral Daily   metoprolol tartrate 25 mg Oral BID   sertraline 25 mg Oral HS          albuterol 2 puff Inhalation Q4H PRN x1   docusate sodium 100 mg Oral BID PRN   LORazepam 0 5 mg Oral Q8H PRN x1   methocarbamol 500 mg Oral TID PRN x3 ondansetron 4 mg Intravenous Q6H PRN       IP CONSULT TO CASE MANAGEMENT    Network Utilization Review Department  Phone: [phone]; Fax [phone]  [email or web address]  ATTENTION: Please call with any questions or concerns to [phone]  and carefully listen to the prompts so that you are directed to the right person  Send all requests for admission clinical reviews, approved or denied determinations and any other requests to fax [phone]   All voicemails are confidential

## 2019-10-13 NOTE — SOCIAL WORK
Cm met with patient to review role of CM  Patient presented as alert during conversation  Patient reported that she lives on the same property as her daughter, Lila Sandy, [phone]  Patient reported that she resides in a apartment with 2 steps to enter with rails  Patient reported that she is independent with a RW and other ADLS  Patient denied having any inpatient PT/OT, inpatient MH, and substance abuse treatment  Patient reported that she had Nilda Garrett for PT in the past  Patient reported that [facility] [address] is [facility] of choice  Patient reported that [facility] is PCP  CM reviewed d/c planning process including the following: identifying help at home, patient preference for d/c planning needs, Discharge Lounge, [facility] Meds to Bed program, availability of treatment team to discuss questions or concerns patient and/or family may have regarding understanding medications and recognizing signs and symptoms once discharged  CM also encouraged patient to follow up with all recommended appointments after discharge  Patient advised of importance for patient and family to participate in managing patients medical well being

## 2019-10-13 NOTE — PLAN OF CARE
Problem: Potential for Falls  Goal: Patient will remain free of falls  Description  INTERVENTIONS:  - Assess patient frequently for physical needs  -  Identify cognitive and physical deficits and behaviors that affect risk of falls    -  [place] fall precautions as indicated by assessment   - Educate patient/family on patient safety including physical limitations  - Instruct patient to call for assistance with activity based on assessment  - Modify environment to reduce risk of injury  - Consider OT/PT consult to assist with strengthening/mobility  Outcome: Progressing     Problem: PAIN - ADULT  Goal: Verbalizes/displays adequate comfort level or baseline comfort level  Description  Interventions:  - Encourage patient to monitor pain and request assistance  - Assess pain using appropriate pain scale  - Administer analgesics based on type and severity of pain and evaluate response  - Implement non-pharmacological measures as appropriate and evaluate response  - Consider cultural and social influences on pain and pain management  - Notify physician/advanced practitioner if interventions unsuccessful or patient reports new pain  Outcome: Progressing     Problem: INFECTION - ADULT  Goal: Absence or prevention of progression during hospitalization  Description  INTERVENTIONS:  - Assess and monitor for signs and symptoms of infection  - Monitor lab/diagnostic results  - Monitor all insertion sites, i e  indwelling lines, tubes, and drains  - Monitor endotracheal if appropriate and nasal secretions for changes in amount and color  - [place] appropriate cooling/warming therapies per order  - Administer medications as ordered  - Instruct and encourage patient and family to use good hand hygiene technique  - Identify and instruct in appropriate isolation precautions for identified infection/condition  Outcome: Progressing  Goal: Absence of fever/infection during neutropenic period  Description  INTERVENTIONS:  - Monitor WBC    Outcome: Progressing     Problem: SAFETY ADULT  Goal: Maintain or return to baseline ADL function  Description  INTERVENTIONS:  -  Assess patient's ability to carry out ADLs; assess patient's baseline for ADL function and identify physical deficits which impact ability to perform ADLs (bathing, care of mouth/teeth, toileting, grooming, dressing, etc )  - Assess/evaluate cause of self-care deficits   - Assess range of motion  - Assess patient's mobility; develop plan if impaired  - Assess patient's need for assistive devices and provide as appropriate  - Encourage maximum independence but intervene and supervise when necessary  - Involve family in performance of ADLs  - Assess for home care needs following discharge   - Consider OT consult to assist with ADL evaluation and planning for discharge  - Provide patient education as appropriate  Outcome: Progressing  Goal: Maintain or return mobility status to optimal level  Description  INTERVENTIONS:  - Assess patient's baseline mobility status (ambulation, transfers, stairs, etc )    - Identify cognitive and physical deficits and behaviors that affect mobility  - Identify mobility aids required to assist with transfers and/or ambulation (gait belt, sit-to-stand, lift, walker, cane, etc )  - [place] fall precautions as indicated by assessment  - Record patient progress and toleration of activity level on Mobility SBAR; progress patient to next Phase/Stage  - Instruct patient to call for assistance with activity based on assessment  - Consider rehabilitation consult to assist with strengthening/weightbearing, etc   Outcome: Progressing     Problem: DISCHARGE PLANNING  Goal: Discharge to home or other facility with appropriate resources  Description  INTERVENTIONS:  - Identify barriers to discharge w/patient and caregiver  - Arrange for needed discharge resources and transportation as appropriate  - Identify discharge learning needs (meds, wound care, etc )  - Arrange for interpretive services to assist at discharge as needed  - Refer to Case Management Department for coordinating discharge planning if the patient needs post-hospital services based on physician/advanced practitioner order or complex needs related to functional status, cognitive ability, or social support system  Outcome: Progressing

## 2019-10-13 NOTE — OCCUPATIONAL THERAPY NOTE
[address] Evaluation     Patient Name: Elian Ward  AUWRS'E Date: 10/13/2019  Problem List  Principal Problem:    Ambulatory dysfunction  Active Problems:    Acute kidney injury [facility])    Traumatic subdural hematoma without loss of consciousness ([facility] )    Past Medical History  Past Medical History:   Diagnosis Date    Arthritis     Breast cancer ([facility] ) 2015    Cardiac disease     aortic valve transplant    [facility]     Compression fracture of body of thoracic vertebra ([facility]     CVA (cerebral vascular accident) ([facility] )     Diabetes mellitus ([facility] )     Disease of thyroid gland     Fibromyalgia, primary     H/O cervical fracture 01/09/2019    Hyperlipidemia     Hypertension     Neuropathy     Pressure injury of skin     Renal disorder     kidney stent    Stroke [facility])      Past Surgical History  Past Surgical History:   Procedure Laterality Date    AORTIC VALVE SURGERY      BREAST LUMPECTOMY Right     BREAST LUMPECTOMY Left     BREAST SURGERY      lumpectomy for breast cancer    CATARACT EXTRACTION      CHOLECYSTECTOMY      HYSTERECTOMY  1978    KIDNEY SURGERY      stent placed for kidney    OTHER SURGICAL HISTORY      abdominal aneurysm surgery    [place] ARTHRODESIS POSTERIOR ATLAS-AXIS C1-C2 N/A 5/1/2019    Procedure: C1-C2 lateral mass fixation fusion with possible sublaminar cables;  Surgeon: Liv Castañeda MD;  [facility];  Service: Neurosurgery    REPLACEMENT TOTAL KNEE      left          10/13/19 1111   Note Type   Note type Eval/Treat   Restrictions/Precautions   Weight Bearing Precautions Per Order No   Other Precautions Cognitive; Chair Alarm; Bed Alarm;O2;Fall Risk   Pain Assessment   Pain Assessment 0-10   Pain Score 6   Pain Type Acute pain;Chronic pain   Pain Location [facility] Pain Intervention(s) Repositioned; Ambulation/increased activity; Emotional support   Home Living   Type of [facility] level;Ramped entrance   [place] Grab bars in shower;Grab bars around toilet   [address] [facility]   Prior Function   Level of [place] Independent with ADLs and functional mobility; Needs assistance with IADLs   Lives With Daughter lives on same property (pt in own apt)   [address] Help From Family   ADL Assistance Independent   IADLs Needs assistance   Falls in the last 6 months 1 to 4   Vocational Retired   Lifestyle   Autonomy I basic adls and mobility - family manages majority of iadls    Reciprocal Relationships supportive family    Service to Others retired   Intrinsic Gratification  mostly sedentary    Subjective   Subjective c/o neck pain    ADL   Eating Assistance 5  Supervision/Setup   Grooming Assistance 4  Minimal Assistance   [address] 4  Minimal Assistance   [address] 3  Moderate Assistance   [address] 4  [address] [place]  3  Moderate Assistance   Transfers   Sit to Stand 3  Moderate assistance   Stand to Sit 3  Moderate assistance   Stand pivot 3  Moderate assistance   Functional Mobility   Functional Mobility 3  Moderate assistance   Additional items Rolling walker   Balance   Static Sitting Fair   Dynamic Sitting Fair -   Static Standing Poor   Dynamic Standing Poor   Ambulatory Poor   Activity Tolerance   Activity Tolerance Patient limited by fatigue;Patient limited by pain;Treatment limited secondary to medical complications (Comment)   RUE Assessment   RUE Assessment WFL   LUE Assessment   LUE Assessment WFL   Cognition   Overall Cognitive Status Impaired   Arousal/Participation Arousable; Cooperative   Attention Attends with cues to redirect   Orientation Level Oriented to person;Oriented to place;Oriented to time;Oriented to situation   Memory Decreased recall of precautions   Following Commands Follows one step commands with increased time or repetition   Assessment   Limitation Decreased ADL status; Decreased Safe judgement during ADL;Decreased endurance;Decreased self-care trans   Prognosis Good;Fair   Assessment Pt is a 80 y o  female who was [facility] to [facility] on 10/12/2019 with Ambulatory dysfunction s/p fall (lowered to ground while attempting to step up a step with home care) - pt recently admitted with SDH s/p fall and d/c home with home services 10/10 after pt/family declined PT/OT recommendations for inpt rehab  Pt's problem list also includes PMH of DM, HTN, underlying neurological disorder, previous surgery, cancer history, neuropathy and arthritis, breast ca, aortic valve replacement, CHF, compression fx T spine, CVA, thryoid disease, fibromyalgia, h/o cervical fx, hld, renal disorder with kidney stent  At baseline pt was completing adls independently and ambulating with RW - dtr assists with iadls -   Pt lives in apt on same property as dtr  Currently pt requires mod to max assist for overall ADLS and moderate assist for functional mobility/transfers  Pt currently presents with impairments in the following categories -limited home support, difficulty performing ADLS, difficulty performing IADLS , decreased initiation and engagement  and health management  activity tolerance, endurance, standing balance/tolerance, memory, safety , judgement  and attention   These impairments, as well as pt's fatigue, pain, decreased caregiver support and risk for falls  limit pt's ability to safely engage in all baseline areas of occupation, includingbathing, dressing, toileting, functional mobility/transfers, community mobility, house maintenance, social participation  and leisure activities  From OT standpoint, recommend inpt rehab  upon D/C  OT will continue to follow to address the below stated goals      Goals   Patient Goals go home    LTG Time Frame 10-14   Long Term Goal #1 refer to established goals below   Plan   Treatment Interventions ADL retraining;Functional transfer training; Endurance training;Patient/family training;Cognitive reorientation;Equipment evaluation/education; Compensatory technique education; Activityengagement   Goal Expiration Date 10/27/19   OT Frequency 3-5x/wk   Recommendation   OT Discharge Recommendation Short Term Rehab   Barthel Index   Feeding 10   Bathing 0   Grooming Score 0   Dressing Score 5   Bladder Score 10   Bowels Score 10   Toilet Use Score 5   Transfers (Bed/Chair) Score 5   Mobility (Level Surface) Score 0   Stairs Score 0   Barthel Index Score 45       OCCUPATIONAL THERAPY GOALS:    *Mod I adls after setup with use of AE PRN  *Mod I toileting and clothing management   *Mod I functional mobility and transfers to/from all surfaces with good dynamic balance and safety for participation in dynamic adls and iadl tasks   *Demonstrate good carryover with safe use of RW during functional tasks   *Assess DME needs   *Increase activity tolerance to 35-40 minutes for participation in adls and enjoyable activities  *Pt to participate in ongoing functional cognitive assessment with good attention/participation to assist with safe d/c recommendations

## 2019-10-13 NOTE — H&P
H&P Exam - Trauma   Jj Jones 80 y o  female MRN: [id number]  [facility] Encounter: [id number]    Assessment/Plan   Trauma Alert: Evaluation  Model of Arrival: Ambulance  Trauma Team: Attending Jacob Davis, Residents Alessandro Langford and Fellow Jeannette Jiménez  Consultants: None    Trauma Active Problems:   Gait instability    Trauma Plan:   Admit  PT/OT  Rehab placement    Chief Complaint: felt weak on my feet    History of Present Illness   HPI:  Jj Jones is a 80 y o  female who presents to the ED with weakness  She was recently admitted  To the trauma service with a subdural hematoma  She was working with her home   Nurse today  She was getting out of the bathroom and felt unsteady on her feet  She was helped to the ground  Due to this, she presented to the ED for evaluation  Mechanism:Other: none    Review of Systems   Constitutional: Negative  HENT: Negative  Eyes: Negative  Respiratory: Negative  Cardiovascular: Negative  Gastrointestinal: Negative  Endocrine: Negative  Genitourinary: Negative  Musculoskeletal: Negative  Skin: Negative  Allergic/Immunologic: Negative  Neurological: Positive for weakness  Hematological: Negative  Psychiatric/Behavioral: Negative  Historical Information   History is unobtainable from the patient due to n/a    Efforts to obtain history included the following sources: family member, obtained from other records    Past Medical History:   Diagnosis Date    Arthritis     Breast cancer [facility]) 2015    Cardiac disease     aortic valve transplant    [facility]     Compression fracture of body of thoracic vertebra ([facility]     CVA [facility]cerebral vascular accident) [facility]     Diabetes mellitus [facility]     Disease of thyroid gland     Fibromyalgia, primary     H/O cervical fracture 01/09/2019    Hyperlipidemia     Hypertension     Neuropathy     Pressure injury of skin     Renal disorder     kidney stent    Stroke ([facility] Past Surgical History:   Procedure Laterality Date    AORTIC VALVE SURGERY      BREAST LUMPECTOMY Right     BREAST LUMPECTOMY Left     BREAST SURGERY      lumpectomy for breast cancer    CATARACT EXTRACTION      CHOLECYSTECTOMY      HYSTERECTOMY  1978    KIDNEY SURGERY      stent placed for kidney    OTHER SURGICAL HISTORY      abdominal aneurysm surgery    [place] ARTHRODESIS POSTERIOR ATLAS-AXIS C1-C2 N/A 5/1/2019    Procedure: C1-C2 lateral mass fixation fusion with possible sublaminar cables;  Surgeon: Guicho Carnes MD;  [facility];  Service: Neurosurgery    REPLACEMENT TOTAL KNEE      left      Social History   Social History     Substance and Sexual Activity   Alcohol Use Not Currently    Frequency: Never    Binge frequency: Never     Social History     Substance and Sexual Activity   Drug Use No     Social History     Tobacco Use   Smoking Status Never Smoker   Smokeless Tobacco Never Used     Immunization History   Administered Date(s) Administered    DTaP 10/08/2015    INFLUENZA 10/01/2013, 10/06/2014, 11/03/2014, 10/08/2015, 10/04/2016, 10/24/2017, 10/30/2018    Influenza Quadrivalent Preservative Free 3 years and older IM 11/03/2014, 10/08/2015    Influenza Split High Dose Preservative Free IM 10/04/2016, 10/24/2017, 10/30/2018, 09/10/2019    Influenza TIV (IM) 10/01/2013, 10/06/2014    Pneumococcal Conjugate 13-Valent 10/08/2015    Pneumococcal Polysaccharide PPV23 02/07/2017    Tdap 10/08/2015    Zoster 03/12/2009, 03/12/2009     Last Tetanus: 2015  Family History: Non-contributory  Unable to obtain/limited by n/a      Meds/Allergies   PTA meds:   Prior to Admission Medications   Prescriptions Last Dose Informant Patient Reported? Taking?    HYDROcodone-acetaminophen (NORCO) 5-325 mg per tablet   Yes Yes   Sig: Take 1 tablet by mouth every 6 (six) hours as needed for pain   LORazepam (ATIVAN) 0 5 mg tablet   Yes Yes   Sig: Take by mouth every 8 (eight) hours as needed for anxiety   Multiple Vitamins-Calcium (MULTI-DAY/CALCIUM/EXTRA IRON PO)  Self Yes Yes   Sig: Take 1 tablet by mouth daily   Multiple Vitamins-Minerals (CENTRUM ADULTS PO)  Self Yes No   Sig: Centrum   Omega-3 Fatty Acids (FISH OIL) 1,000 mg  Self Yes Yes   Sig: Fish Oil 1,000 mg capsule   Red Yeast Rice Extract (RED YEAST RICE PO)   Yes Yes   Sig: Take 600 mg by mouth daily   acetaminophen (TYLENOL) 325 mg tablet  Self No No   Sig: Take 2 tablets (650 mg total) by mouth every 6 (six) hours as needed for mild pain   Patient taking differently: Take 1,000 mg by mouth 3 (three) times a day as needed for mild pain    albuterol (PROVENTIL HFA,VENTOLIN HFA) 90 mcg/act inhaler  Self Yes Yes   Sig: Inhale 2 puffs every 4 (four) hours as needed for wheezing   amLODIPine (NORVASC) 5 mg tablet  Self Yes Yes   Sig: Take 5 mg by mouth every morning    calcium carbonate-vitamin D (OSCAL-D) 500 mg-200 units per tablet  Self Yes Yes   Sig: Take 1 tablet by mouth 2 (two) times a day with meals     docusate sodium (COLACE) 100 mg capsule   No No   Sig: Take 1 capsule (100 mg total) by mouth 2 (two) times a day as needed for constipation   gabapentin (NEURONTIN) 300 mg capsule  Self No Yes   Sig: Take 2 capsules PO three times a day   levETIRAcetam (KEPPRA) 500 mg tablet   No Yes   Sig: Take 1 tablet (500 mg total) by mouth 2 (two) times a day for 5 days   levothyroxine 112 mcg tablet   Yes Yes   Sig: Take 112 mcg by mouth daily    lidocaine (LIDODERM) 5 %  Self Yes Yes   Sig: Apply 2 patches topically daily Remove & Discard patch within 12 hours or as directed by MD     methocarbamol (ROBAXIN) 500 mg tablet  Self No Yes   Sig: Take 1 tablet (500 mg total) by mouth 3 (three) times a day as needed for muscle spasms   metoprolol tartrate (LOPRESSOR) 25 mg tablet  Self Yes Yes   Sig: Take 25 mg by mouth 2 (two) times a day    nitroglycerin (NITROSTAT) 0 4 mg SL tablet  Self Yes No   Sig: Place under the tongue every 5 (five) minutes as needed for chest pain    nystatin (MYCOSTATIN) powder  Self Yes Yes   Sig: Apply topically as needed    sertraline (ZOLOFT) 25 mg tablet  Self Yes Yes   Si mg daily at bedtime       Facility-Administered Medications: None       Allergies   Allergen Reactions    Duloxetine Hcl Other (See Comments) and Hypertension    Duloxetine Hcl      Cymbalta; Hemorrhagic stroke listed as reaction    Escitalopram      Other reaction(s): Urinary Retention    Pregabalin      Other reaction(s): Hypertension    Statins Myalgia    Tramadol      Other reaction(s): Hypertension    Triprolidine-Pse Other (See Comments)    Anastrozole Abdominal Pain    Anastrozole     Antihistamines, Diphenhydramine-Type     Exemestane      Aromasin; Muscle pain & cramps    Lexapro [Escitalopram]      Urinary retention    Lyrica [Pregabalin]      hypertension    Metformin      diarrhea    Oxycodone      Pt unsure      Statins      Muscle pain & cramps    Tramadol      hypertension    Triprolidine-Pseudoephedrine     Metformin Diarrhea         PHYSICAL EXAM    PE limited by: n/a    Objective   Vitals:   First set: Temperature: 97 5 °F (36 4 °C) (10/12/19 1112)  Pulse: 62 (10/12/19 1112)  Respirations: 22 (10/12/19 1112)  Blood Pressure: 135/66 (10/12/19 1112)    Primary Survey:   (A) Airway: intact  (B) Breathing: breath sounds bilaterally  (C) Circulation: Pulses:   normal  (D) Disabliity:  GCS Total:  15  (E) Expose:  Completed    Secondary Survey: (Click on Physical Exam tab above)  Physical Exam   Constitutional: She is oriented to person, place, and time  She appears well-developed and well-nourished  HENT:   Head: Normocephalic  Eyes: Pupils are equal, round, and reactive to light  Neck: No tracheal deviation present  Cardiovascular: Normal rate  Pulmonary/Chest: Effort normal  No respiratory distress  Abdominal: Soft  She exhibits no distension  There is no tenderness  Musculoskeletal: She exhibits no tenderness  Neurological: She is alert and oriented to person, place, and time  Skin: Skin is warm and dry  Capillary refill takes less than 2 seconds  Psychiatric: She has a normal mood and affect  Her behavior is normal        Invasive Devices     Drain            External Urinary Catheter 4 days                Lab Results:   BMP/CMP:   Lab Results   Component Value Date    SODIUM 130 (L) 10/12/2019    K 4 3 10/12/2019    CL 93 (L) 10/12/2019    CO2 29 10/12/2019    BUN 43 (H) 10/12/2019    CREATININE 1 87 (H) 10/12/2019    CALCIUM 10 3 (H) 10/12/2019    EGFR 24 10/12/2019   , CBC: No results found for: WBC, HGB, HCT, MCV, PLT, ADJUSTEDWBC, MCH, MCHC, RDW, MPV, NRBC and Coagulation: No results found for: PT, INR, APTT  Imaging/EKG Studies: Results: I have personally reviewed pertinent reports      Other Studies: n/a    Code Status: Level 1 - Full Code  Advance Directive and Living Will: Yes    Power of : Yes  POLST:

## 2019-10-13 NOTE — PHYSICAL THERAPY NOTE
Physical Therapy Evaluation    Patient's Name: Yaima Ornelas    Admitting Diagnosis  Injury [T14 [id number]]    Problem List  Patient Active Problem List   Diagnosis    TIA (transient ischemic attack)    UTI (urinary tract infection)    Hypertension    Diabetes ([facility] [facility]    Hypothyroidism    HX: breast cancer    H/O: CVA (cerebrovascular accident)    Osteoporosis    Arthritis    Fibromyalgia    Bilateral malignant neoplasm of breast in female, estrogen receptor positive ([facility] [address] )    Closed nondisplaced fracture of second cervical vertebra (HCC)    Neck pain, acute    Type III fracture of odontoid process ([facility] [address] )    C6 cervical fracture ([address]    Essential hypertension    Hypothyroidism    Fall    Ambulatory dysfunction    Acute pain    Renovascular hypertension    Chronic diastolic heart failure (HCC)    History of CVA (cerebrovascular accident)    Type 2 diabetes mellitus with diabetic polyneuropathy ([facility])    Depression    Stage 3 chronic kidney disease ([facility])    Rheumatoid arthritis involving multiple sites ([facility] [address] )    Fibromyalgia    History of aortic valve replacement with bioprosthetic valve    Renal artery stenosis ([facility])    Parenchymal renal hypertension    Pain    Lesion of left lung    Trigger point    Dermatitis associated with moisture    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    Elevated troponin    Acute respiratory failure with hypoxia (HCC)    Acute kidney injury ([address]    Dysphagia    Traumatic subdural hematoma without loss of consciousness ([facility])       Past Medical History  Past Medical History:   Diagnosis Date    Arthritis     Breast cancer ([facility] [address] ) 2015    Cardiac disease     aortic valve transplant    CHF (congestive heart failure) ([facility])     Compression fracture of body of thoracic vertebra (HCC)     CVA (cerebral vascular accident) ([address] )     Diabetes mellitus ([address] )     Disease of thyroid gland     Fibromyalgia, primary     H/O cervical fracture 01/09/2019    Hyperlipidemia     Hypertension     Neuropathy     Pressure injury of skin     Renal disorder     kidney stent    Stroke [facility])        Past Surgical History  Past Surgical History:   Procedure Laterality Date    AORTIC VALVE SURGERY      BREAST LUMPECTOMY Right     BREAST LUMPECTOMY Left     BREAST SURGERY      lumpectomy for breast cancer    CATARACT EXTRACTION      CHOLECYSTECTOMY      HYSTERECTOMY  1978    KIDNEY SURGERY      stent placed for kidney    OTHER SURGICAL HISTORY      abdominal aneurysm surgery    OK ARTHRODESIS POSTERIOR ATLAS-AXIS C1-C2 N/A 5/1/2019    Procedure: C1-C2 lateral mass fixation fusion with possible sublaminar cables;  Surgeon: Amilcar Dixon MD;  [facility];  Service: Neurosurgery    REPLACEMENT TOTAL KNEE      left           10/13/19 1110   Note Type   Note type Eval only   Pain Assessment   Pain Assessment 0-10   Pain Score 6   Pain Type Chronic pain   Pain Location Back;Neck   Hospital Pain Intervention(s) Ambulation/increased activity   Response to Interventions Decreased pain; pain is worse when immobile   Home Living   Type of [address] One level;Performs ADLs on one level  (3 ISRA in front; ramp in the back; pt goes through back)   Bathroom Shower/Tub Walk-in shower   Bathroom Toilet Standard   Bathroom Equipment Grab bars in shower;Grab bars around toilet   [facility]   Prior Function   Level of [address] with ADLs and functional mobility; Needs assistance with IADLs  (some assist w/driving; otherwise (I) w/IADLS)   Lives With Daughter   Receives Help From Family   ADL Assistance Independent   IADLs Needs assistance   Falls in the last 6 months 1 to 4   Vocational Retired   Comments Pt lives w/her dtr and her family; pt is able to perform all ADLs and IADLs w/assistance only w/driving   Restrictions/Precautions Weight Bearing Precautions Per Order No   Other Precautions Cognitive; Chair Alarm; Fall Risk;O2;Multiple lines   General   Family/Caregiver Present No   Cognition   Overall Cognitive Status Impaired   Arousal/Participation Responsive   Orientation Level Oriented X4   Memory Within functional limits   Following Commands Follows one step commands with increased time or repetition   RLE Assessment   RLE Assessment   (grossly 3+/5)   LLE Assessment   LLE Assessment   (grossly 4/5)   Transfers   Sit to Stand 4  Minimal assistance   Additional items Assist x 2; Increased time required;Verbal cues  (VC for hand placement)   Stand to Sit 4  Minimal assistance   Additional items Assist x 2; Increased time required   Ambulation/Elevation   Gait pattern Forward Flexion; Shuffling; Foward flexed; Short stride; Excessively slow   Gait Assistance 4  Minimal assist   Additional items Assist x 1  (w/chair follow)   Assistive Device Rolling walker   Distance 15' w/RW; sat back in chair due to fatigue   Balance   Static Sitting Poor   Dynamic Sitting Poor   Static Standing Poor   Dynamic Standing Poor   Ambulatory Poor   Endurance Deficit   Endurance Deficit Yes   Endurance Deficit Description Fatigue; pt was wheezing during tx   Activity Tolerance   Activity Tolerance Patient limited by fatigue   Nurse Made Aware Yes   Assessment   Prognosis Fair   Problem List Decreased strength;Decreased endurance; Impaired balance;Decreased mobility; Decreased cognition;Pain   Assessment Pt is a 79 y/o female who is seen for high complexity PT evaluation after being admitted for a recent fall  Pt was recently D/C from facility after a fall in which she suffered an SDH  Pt was D/C'd 3 days ago w/home PT/OT  Pt was re-admitted yesterday after falling in her home while attempting to ascend 1 stair   Pt co-morbidities/PMHx consist of CKD III, depression, TIA, UTI, HTN, DM, hypothyroidism, hx of breast cancer, CVA, OP, RA, diabetic polyneuropathy, C/S fractures, fibromyalgia, HERLINDA, and dysphagia  Pt currently lives w/her daughter and her family in a 1 story house  Pt enters the home through the back of the house with a ramp  PTA, pt states that she was (I) w/all ADLs and IADLs, except for driving and cooking at times  Pt ambulates with a RW regularly  Upon evaluation, pt reported that her legs still felt weak  During strength assessment, there was more weakness in the (R) LE compared to the (L) LE  Pt believes that it is residual from her past stroke  Pt required min A x 2 for transfers and min A x 1 w/chair follow during ambulation w/RW  Pt was only able to ambulate 15' before stopping due to fatigue  Recommend that pt receive skilled PT until medically cleared for D/C  Once medically cleared, recommend be D/C to rehab  Goals   Patient Goals go home   STG Expiration Date 10/27/19   Short Term Goal #1 In 10-14 days, pt will be able to ambulate 100' w/RW at supervision to improve functional independence; pt will be able to perform all transfers at supervision to improve ability to negotiate multi-level surfaces; pt will be able to improve standing balance from poor to fair- to improve safety in standing; pt will be able to improve (R) LE strength by 1/2-1 grade to improve overall function   PT Treatment Day 0   Plan   Treatment/Interventions Functional transfer training;LE strengthening/ROM; Therapeutic exercise;Gait training;Bed mobility; Endurance training   PT Frequency   (3-5x/wk)   Recommendation   Recommendation Post acute IP rehab   Equipment Recommended Walker   PT - OK to Discharge Yes   Modified Rosy Scale   Modified Rosy Scale 4   Barthel Index   Feeding 10   Bathing 0   Grooming Score 5   Dressing Score 5   Bladder Score 10   Bowels Score 10   Toilet Use Score 5   Transfers (Bed/Chair) Score 5   Mobility (Level Surface) Score 0   Stairs Score 0   Barthel Index Score 50       Joshua Randall, SPT

## 2019-10-14 ENCOUNTER — APPOINTMENT (OUTPATIENT)
Dept: PHYSICAL THERAPY | Age: 84
End: 2019-10-14
Payer: COMMERCIAL

## 2019-10-14 PROCEDURE — 97535 SELF CARE MNGMENT TRAINING: CPT

## 2019-10-14 PROCEDURE — 97530 THERAPEUTIC ACTIVITIES: CPT

## 2019-10-14 PROCEDURE — 99232 SBSQ HOSP IP/OBS MODERATE 35: CPT | Performed by: PHYSICIAN ASSISTANT

## 2019-10-14 PROCEDURE — 97116 GAIT TRAINING THERAPY: CPT

## 2019-10-14 PROCEDURE — 97110 THERAPEUTIC EXERCISES: CPT

## 2019-10-14 RX ORDER — DOCUSATE SODIUM 100 MG/1
100 CAPSULE, LIQUID FILLED ORAL 2 TIMES DAILY
Status: DISCONTINUED | OUTPATIENT
Start: 2019-10-14 | End: 2019-10-19 | Stop reason: HOSPADM

## 2019-10-14 RX ORDER — FUROSEMIDE 20 MG/1
20 TABLET ORAL ONCE
Status: COMPLETED | OUTPATIENT
Start: 2019-10-14 | End: 2019-10-14

## 2019-10-14 RX ORDER — NYSTATIN 100000 [USP'U]/G
POWDER TOPICAL 2 TIMES DAILY
Status: DISCONTINUED | OUTPATIENT
Start: 2019-10-14 | End: 2019-10-19 | Stop reason: HOSPADM

## 2019-10-14 RX ORDER — FUROSEMIDE 20 MG/1
10 TABLET ORAL DAILY
Status: DISCONTINUED | OUTPATIENT
Start: 2019-10-15 | End: 2019-10-15

## 2019-10-14 RX ORDER — ACETAMINOPHEN 325 MG/1
650 TABLET ORAL EVERY 6 HOURS PRN
Status: DISCONTINUED | OUTPATIENT
Start: 2019-10-14 | End: 2019-10-19 | Stop reason: HOSPADM

## 2019-10-14 RX ADMIN — METOPROLOL TARTRATE 25 MG: 25 TABLET, FILM COATED ORAL at 17:26

## 2019-10-14 RX ADMIN — GABAPENTIN 300 MG: 300 CAPSULE ORAL at 08:12

## 2019-10-14 RX ADMIN — LEVOTHYROXINE SODIUM 112 MCG: 112 TABLET ORAL at 08:12

## 2019-10-14 RX ADMIN — NYSTATIN: 100000 POWDER TOPICAL at 14:07

## 2019-10-14 RX ADMIN — GABAPENTIN 300 MG: 300 CAPSULE ORAL at 23:04

## 2019-10-14 RX ADMIN — AMLODIPINE BESYLATE 5 MG: 5 TABLET ORAL at 08:12

## 2019-10-14 RX ADMIN — GABAPENTIN 300 MG: 300 CAPSULE ORAL at 17:26

## 2019-10-14 RX ADMIN — DOCUSATE SODIUM 100 MG: 100 CAPSULE, LIQUID FILLED ORAL at 10:47

## 2019-10-14 RX ADMIN — FUROSEMIDE 20 MG: 20 TABLET ORAL at 10:47

## 2019-10-14 RX ADMIN — METHOCARBAMOL TABLETS 500 MG: 500 TABLET, COATED ORAL at 17:31

## 2019-10-14 RX ADMIN — SERTRALINE HYDROCHLORIDE 25 MG: 25 TABLET ORAL at 23:04

## 2019-10-14 RX ADMIN — METOPROLOL TARTRATE 25 MG: 25 TABLET, FILM COATED ORAL at 08:12

## 2019-10-14 RX ADMIN — HEPARIN SODIUM 5000 UNITS: 5000 INJECTION INTRAVENOUS; SUBCUTANEOUS at 14:06

## 2019-10-14 RX ADMIN — LEVETIRACETAM 500 MG: 500 TABLET, FILM COATED ORAL at 17:26

## 2019-10-14 RX ADMIN — HEPARIN SODIUM 5000 UNITS: 5000 INJECTION INTRAVENOUS; SUBCUTANEOUS at 06:21

## 2019-10-14 RX ADMIN — HEPARIN SODIUM 5000 UNITS: 5000 INJECTION INTRAVENOUS; SUBCUTANEOUS at 23:04

## 2019-10-14 RX ADMIN — ALBUTEROL SULFATE 2 PUFF: 90 AEROSOL, METERED RESPIRATORY (INHALATION) at 08:00

## 2019-10-14 RX ADMIN — LEVETIRACETAM 500 MG: 500 TABLET, FILM COATED ORAL at 08:12

## 2019-10-14 NOTE — ASSESSMENT & PLAN NOTE
Wt Readings from Last 3 Encounters:   10/13/19 88 5 kg (195 lb 3 2 oz)   10/10/19 84 3 kg (185 lb 14 4 oz)   10/08/19 84 1 kg (185 lb 6 5 oz)       - Lasix was recently held due to HERLINDA  - O2 sats acceptable but patient with c/o SOB  - will give MDI  - small dose Lasix now

## 2019-10-14 NOTE — PLAN OF CARE
Problem: OCCUPATIONAL THERAPY ADULT  Goal: Performs self-care activities at highest level of function for planned discharge setting  See evaluation for individualized goals  Description  Treatment Interventions: ADL retraining, Functional transfer training, Endurance training, Patient/family training, Cognitive reorientation, Equipment evaluation/education, Compensatory technique education, Activityengagement          See flowsheet documentation for full assessment, interventions and recommendations  Outcome: Progressing  Note:   Limitation: Decreased ADL status, Decreased Safe judgement during ADL, Decreased endurance, Decreased self-care trans  Prognosis: Good, Fair  Assessment: pt participated in am ot session and was seen focusing on toileting, functional mobility, functional transfers and activity tolerence  pt required max asst toileting including clothing management and anal care  pt required min/mod  asst for functional mobility to from bathroom needing asst to steer rw and mod cues for technqiue and safety       OT Discharge Recommendation: Short Term Rehab   April [facility]

## 2019-10-14 NOTE — PLAN OF CARE
Problem: PHYSICAL THERAPY ADULT  Goal: Performs mobility at highest level of function for planned discharge setting  See evaluation for individualized goals  Description  Treatment/Interventions: Functional transfer training, LE strengthening/ROM, Therapeutic exercise, Gait training, Bed mobility, Endurance training  Equipment Recommended: Yoselin Bonilla       See flowsheet documentation for full assessment, interventions and recommendations  Outcome: Progressing  Note:   Prognosis: Fair  Problem List: Decreased strength, Decreased endurance, Impaired balance, Decreased mobility, Decreased cognition, Pain  Assessment: Pt continues to require excessive time & assist x1-2 for all mobiilty at this time due to generalized weakness & fatigue  No LOB noted & pt able to attend to instructions for seequencing throughout  Seated rests given during session to recover  Pt required assist for loreto hygeine & donning/doffing brief during session  Pt performed LE exercises as noted above to improve LE strength to facilitate improved transfers & increase activity tolerance  Pt reports "crunching" in R knee with LAQ, & pain in L knee  At conclusion of sesison, pt remained seated in recliner with all needs in reach & no new questions or concerns  Caregiver arrived at conclusion of session & discussed pt progress & current mobility  Will continue to benefit from therapy service to progress functional mobility, activity tolerance, strength, and balance to maximize safe mobility  Barriers to Discharge: Inaccessible home environment     Recommendation: Post acute IP rehab     PT - OK to Discharge: Yes    See flowsheet documentation for full assessment

## 2019-10-14 NOTE — OCCUPATIONAL THERAPY NOTE
Occupational Therapy Treatment Note:       10/14/19 1007   Restrictions/Precautions   Other Precautions Fall Risk; Chair Alarm;Cognitive   Pain Assessment   Pain Assessment No/denies pain   Pain Score No Pain   ADL   Toileting Assistance  2  Maximal Assistance   Toileting Comments asst for clothing management and anal care   Functional Standing Tolerance   Time f- balance with rw during toileting   Transfers   Sit to Stand 3  Moderate assistance  (min from tall chair, mod from std toilet c grab bar)   Functional Mobility   Functional Mobility 3  Moderate assistance  (min to mod asst x 1 with rw, mod vc's, weaker l le)   Additional items Rolling walker   Cognition   Overall Cognitive Status Impaired   Arousal/Participation Cooperative   Attention Attends with cues to redirect   Memory Decreased recall of precautions;Decreased recall of recent events;Decreased short term memory   Following Commands Follows one step commands with increased time or repetition   Comments mod vc's for carryover of hand placement and to use rw appropraitly  pt is at risk for falls  Activity Tolerance   Activity Tolerance Patient tolerated treatment well   Assessment   Assessment pt participated in am ot session and was seen focusing on toileting, functional mobility, functional transfers and activity tolerence  pt required max asst toileting including clothing management and anal care  pt required min/mod  asst for functional mobility to from bathroom needing asst to steer rw and mod cues for technqiue and safety  Plan   Treatment Interventions ADL retraining;Functional transfer training; Activityengagement;Patient/family training;Equipment evaluation/education;Cognitive reorientation   Goal Expiration Date 10/27/19   OT Treatment Day 1   OT Frequency 3-5x/wk   Recommendation   OT Discharge Recommendation Short Term Rehab   Barthel Index   Feeding 10   Bathing 0   Grooming Score 0   Dressing Score 5   Bladder Score 5   Bowels Score 10   Toilet Use Score 5   Transfers (Bed/Chair) Score 5   Mobility (Level Surface) Score 0   Stairs Score 0   Barthel Index Score 40   Modified Rapides Scale   Modified Rapides Scale 4   April A SHAWN Jean-Baptiste

## 2019-10-14 NOTE — PLAN OF CARE
Problem: Potential for Falls  Goal: Patient will remain free of falls  Description  INTERVENTIONS:  - Assess patient frequently for physical needs  -  Identify cognitive and physical deficits and behaviors that affect risk of falls    -  [place] fall precautions as indicated by assessment   - Educate patient/family on patient safety including physical limitations  - Instruct patient to call for assistance with activity based on assessment  - Modify environment to reduce risk of injury  - Consider OT/PT consult to assist with strengthening/mobility  Outcome: Progressing     Problem: PAIN - ADULT  Goal: Verbalizes/displays adequate comfort level or baseline comfort level  Description  Interventions:  - Encourage patient to monitor pain and request assistance  - Assess pain using appropriate pain scale  - Administer analgesics based on type and severity of pain and evaluate response  - Implement non-pharmacological measures as appropriate and evaluate response  - Consider cultural and social influences on pain and pain management  - Notify physician/advanced practitioner if interventions unsuccessful or patient reports new pain  Outcome: Progressing     Problem: INFECTION - ADULT  Goal: Absence or prevention of progression during hospitalization  Description  INTERVENTIONS:  - Assess and monitor for signs and symptoms of infection  - Monitor lab/diagnostic results  - Monitor all insertion sites, i e  indwelling lines, tubes, and drains  - Monitor endotracheal if appropriate and nasal secretions for changes in amount and color  - [place] appropriate cooling/warming therapies per order  - Administer medications as ordered  - Instruct and encourage patient and family to use good hand hygiene technique  - Identify and instruct in appropriate isolation precautions for identified infection/condition  Outcome: Progressing  Goal: Absence of fever/infection during neutropenic period  Description  INTERVENTIONS:  - Monitor WBC    Outcome: Progressing     Problem: SAFETY ADULT  Goal: Maintain or return to baseline ADL function  Description  INTERVENTIONS:  -  Assess patient's ability to carry out ADLs; assess patient's baseline for ADL function and identify physical deficits which impact ability to perform ADLs (bathing, care of mouth/teeth, toileting, grooming, dressing, etc )  - Assess/evaluate cause of self-care deficits   - Assess range of motion  - Assess patient's mobility; develop plan if impaired  - Assess patient's need for assistive devices and provide as appropriate  - Encourage maximum independence but intervene and supervise when necessary  - Involve family in performance of ADLs  - Assess for home care needs following discharge   - Consider OT consult to assist with ADL evaluation and planning for discharge  - Provide patient education as appropriate  Outcome: Progressing  Goal: Maintain or return mobility status to optimal level  Description  INTERVENTIONS:  - Assess patient's baseline mobility status (ambulation, transfers, stairs, etc )    - Identify cognitive and physical deficits and behaviors that affect mobility  - Identify mobility aids required to assist with transfers and/or ambulation (gait belt, sit-to-stand, lift, walker, cane, etc )  - [place] fall precautions as indicated by assessment  - Record patient progress and toleration of activity level on Mobility SBAR; progress patient to next Phase/Stage  - Instruct patient to call for assistance with activity based on assessment  - Consider rehabilitation consult to assist with strengthening/weightbearing, etc   Outcome: Progressing     Problem: DISCHARGE PLANNING  Goal: Discharge to home or other facility with appropriate resources  Description  INTERVENTIONS:  - Identify barriers to discharge w/patient and caregiver  - Arrange for needed discharge resources and transportation as appropriate  - Identify discharge learning needs (meds, wound care, etc )  - Arrange for interpretive services to assist at discharge as needed  - Refer to Case Management Department for coordinating discharge planning if the patient needs post-hospital services based on physician/advanced practitioner order or complex needs related to functional status, cognitive ability, or social support system  Outcome: Progressing

## 2019-10-14 NOTE — SOCIAL WORK
Pt recommended for IP rehab  CM spoke to pt's dtr  A post acute care recommendation was made by your care team for Acute Rehab  Discussed [place] [facility] with caregiver  Choice is to make a new referral to [facility]   CM placed and will follow up

## 2019-10-14 NOTE — PROGRESS NOTES
Progress Note - Sonja Mcelroy 1934, 80 y o  female MRN: [id number]    Unit/Bed#: [facility] 608-01 Encounter: [id number]    Primary Care Provider: Basil Essex, DO   Date and time admitted to hospital: 10/12/2019 11:07 AM        Traumatic subdural hematoma without loss of consciousness ([facility] [address] )  Assessment & Plan  - recently discharged 10/10/19  - no new hemorrhage on head CT 10/12/19    Acute kidney injury [facility])  Assessment & Plan  - resolved    Chronic diastolic heart failure (HCC)  Assessment & Plan  Wt Readings from Last 3 Encounters:   10/13/19 88 5 kg (195 lb 3 2 oz)   10/10/19 84 3 kg (185 lb 14 4 oz)   10/08/19 84 1 kg (185 lb 6 5 oz)       - Lasix was recently held due to HERLINDA  - O2 sats acceptable but patient with c/o SOB  - will give MDI  - small dose Lasix now    * Ambulatory dysfunction  Assessment & Plan  - PT/OT  - Gerontology consult  - rehab        Bedside rounds completed with nurse Mable Kelley  Disposition: rehab      SUBJECTIVE:  Chief Complaint: SOB    Subjective: Feels SOB          OBJECTIVE:     Meds/Allergies     Current Facility-Administered Medications:     albuterol (PROVENTIL HFA,VENTOLIN HFA) inhaler 2 puff, 2 puff, Inhalation, Q4H PRN, Gayatri Alfaro MD, 2 puff at 10/14/19 0800    amLODIPine (NORVASC) tablet 5 mg, 5 mg, Oral, QAM, Gayatri Alfaro MD, 5 mg at 10/14/19 6096    docusate sodium (COLACE) capsule 100 mg, 100 mg, Oral, BID PRN, Gayatri Alfaro MD    gabapentin (NEURONTIN) capsule 300 mg, 300 mg, Oral, TID, Gayatri Alfaro MD, 300 mg at 10/14/19 0320    heparin (porcine) subcutaneous injection 5,000 Units, 5,000 Units, Subcutaneous, Q8H [address], 5,000 Units at 10/14/19 0621 **AND** Platelet count, , , Once, Gayatri Alfaro MD    levETIRAcetam [facility]) tablet 500 mg, 500 mg, Oral, BID, Gayatri Alfaro MD, 500 mg at 10/14/19 0247    levothyroxine tablet 112 mcg, 112 mcg, Oral, Daily, Gayatri Alfaro MD, 112 mcg at 10/14/19 8510    LORazepam (ATIVAN) tablet 0 5 mg, 0 5 mg, Oral, Q8H PRN, Gayatri Alfaro MD, 0 5 mg at 10/12/19 2317    methocarbamol (ROBAXIN) tablet 500 mg, 500 mg, Oral, TID PRN, Nichol Chung MD, 500 mg at 10/13/19 2156    metoprolol tartrate (LOPRESSOR) tablet 25 mg, 25 mg, Oral, BID, Nichol hCung MD, 25 mg at 10/14/19 6508    ondansetron [facility]) injection 4 mg, 4 mg, Intravenous, Q6H PRN, Nichol Chung MD    sertraline (ZOLOFT) tablet 25 mg, 25 mg, Oral, HS, Nichol Chung MD, 25 mg at 10/13/19 2156     Vitals:   Vitals:    10/14/19 0716   BP: 136/58   Pulse: 58   Resp: 18   Temp: 98 1 °F (36 7 °C)   SpO2: 92%       Intake/Output:  I/O       10/12 0701 - 10/13 0700 10/13 0701 - 10/14 0700 10/14 0701 - 10/15 0700    P  O  100 1020     Total Intake(mL/kg) 100 (1 1) 1020 (11 5)     Urine (mL/kg/hr) 179 1416 (0 7) 200 (1 1)    Total Output 179 1416 200    Net -79 -396 -200           Unmeasured Urine Occurrence  1 x 1 x           Nutrition/GI Proph/Bowel Reg: Colace    Physical Exam:   Constitution: patient lying in bed, appears comfortable  HEENT: SANDEEP, EOMI  CV: regular rate and rhythm, +2 DP pulses  Pulm: wheezes bilaterally  Abd: soft, nontender, nondistended, active bowel sounds  Extremities: moves all extremities, equal strength, no edema, no skin breakdown  Neuro: A&O, no focal deficits  Skin: no rash or breakdown, warm          Invasive Devices     Drain            External Urinary Catheter 5 days    External Urinary Catheter less than 1 day                 Lab Results: Results: I have personally reviewed pertinent reports  Imaging/EKG Studies: Results: I have personally reviewed pertinent reports      Other Studies: n/a  VTE Prophylaxis: Heparin

## 2019-10-14 NOTE — PHYSICAL THERAPY NOTE
Physical Therapy Progress Note    Magdaleno Ambrocio, PTA       10/14/19 1413   Pain Assessment   Pain Assessment No/denies pain   Restrictions/Precautions   Other Precautions Chair Alarm; Fall Risk;Cognitive   Subjective   Subjective Pt encountered seated in recliner, pleasant and agreeable to treatment  No new complaints given  Observed dyspnea with mobility  Pt encouraged by increased ambulation this session  Transfers   Sit to Stand 4  Minimal assistance   Additional items Assist x 1; Armrests; Increased time required;Assist x 2   Stand to Sit 4  Minimal assistance   Additional items Assist x 1; Armrests; Increased time required   Stand pivot 4  Minimal assistance   Additional items Assist x 1; Increased time required   Toilet transfer 4  Minimal assistance   Additional items Assist x 1;Assist x 2;Armrests; Increased time required;Raised toilet seat  (hand rail)   Ambulation/Elevation   Gait pattern Excessively slow; Short stride; Foward flexed; Shuffling; Inconsistent cande;Decreased foot clearance; Improper Weight shift; Poor UE support   Gait Assistance 4  Minimal assist   Additional items Assist x 1  (+ chair follow)   Assistive Device Rolling walker   Distance 25' x2, 5' x 2   Balance   Static Sitting Fair   Static Standing Fair   Ambulatory Poor +   Endurance Deficit   Endurance Deficit Yes   Endurance Deficit Description fatigue, observed dyspnea, generalized weakness   Activity Tolerance   Activity Tolerance Patient limited by fatigue;Patient tolerated treatment well   Nurse Made Aware yes   Assessment   Prognosis Fair   Problem List Decreased strength;Decreased endurance; Impaired balance;Decreased mobility; Decreased cognition;Pain   Assessment Pt continues to require excessive time & assist x1-2 for all mobiilty at this time due to generalized weakness & fatigue  No LOB noted & pt able to attend to instructions for seequencing throughout  Seated rests given during session to recover    Pt required assist for loreto hygeine & donning/doffing brief during session  Pt performed LE exercises as noted above to improve LE strength to facilitate improved transfers & increase activity tolerance  Pt reports "crunching" in R knee with LAQ, & pain in L knee  At conclusion of sesison, pt remained seated in recliner with all needs in reach & no new questions or concerns  Caregiver arrived at conclusion of session & discussed pt progress & current mobility  Will continue to benefit from therapy service to progress functional mobility, activity tolerance, strength, and balance to maximize safe mobility  Barriers to Discharge Inaccessible home environment   Goals   Patient Goals to rest and get better   STG Expiration Date 10/27/19   PT Treatment Day 1   Plan   Treatment/Interventions Functional transfer training;LE strengthening/ROM; Therapeutic exercise; Endurance training;Patient/family training;Equipment eval/education; Bed mobility;Gait training   Progress Progressing toward goals   PT Frequency   (3-5x/week)   Recommendation   Recommendation Post acute IP rehab   Equipment Recommended Janet Del Cid

## 2019-10-14 NOTE — PHYSICAL THERAPY NOTE
Physical Therapy Progress Note     10/14/19 1413   Pain Assessment   Pain Assessment No/denies pain   Restrictions/Precautions   Other Precautions Chair Alarm; Fall Risk;Cognitive   Subjective   Subjective Pt encountered seated in recliner, pleasant and agreeable to treatment  No new complaints given  Observed dyspnea with mobility  Pt encouraged by increased ambulation this session  Transfers   Sit to Stand 4  Minimal assistance   Additional items Assist x 1; Armrests; Increased time required;Assist x 2   Stand to Sit 4  Minimal assistance   Additional items Assist x 1; Armrests; Increased time required   Stand pivot 4  Minimal assistance   Additional items Assist x 1; Increased time required   Toilet transfer 4  Minimal assistance   Additional items Assist x 1;Assist x 2;Armrests; Increased time required;Raised toilet seat  (hand rail)   Ambulation/Elevation   Gait pattern Excessively slow; Short stride; Foward flexed; Shuffling; Inconsistent cande;Decreased foot clearance; Improper Weight shift; Poor UE support   Gait Assistance 4  Minimal assist   Additional items Assist x 1  (+ chair follow)   Assistive Device Rolling walker   Distance 25' x2, 5' x 2   Balance   Static Sitting Fair   Static Standing Fair   Ambulatory Poor +   Endurance Deficit   Endurance Deficit Yes   Endurance Deficit Description fatigue, observed dyspnea, generalized weakness   Activity Tolerance   Activity Tolerance Patient limited by fatigue;Patient tolerated treatment well   Nurse Made Aware yes   Exercises   Knee AROM Long Arc Quad Sitting;20 reps;AROM; Bilateral   Marching Sitting;20 reps;AROM; Bilateral   Assessment   Prognosis Fair   Problem List Decreased strength;Decreased endurance; Impaired balance;Decreased mobility; Decreased cognition;Pain   Assessment Pt continues to require excessive time & assist x1-2 for all mobiilty at this time due to generalized weakness & fatigue    No LOB noted & pt able to attend to instructions for seequencing throughout  Seated rests given during session to recover  Pt required assist for loreto hygeine & donning/doffing brief during session  Pt performed LE exercises as noted above to improve LE strength to facilitate improved transfers & increase activity tolerance  Pt reports "crunching" in R knee with LAQ, & pain in L knee  At conclusion of sesison, pt remained seated in recliner with all needs in reach & no new questions or concerns  Caregiver arrived at conclusion of session & discussed pt progress & current mobility  Will continue to benefit from therapy service to progress functional mobility, activity tolerance, strength, and balance to maximize safe mobility  Barriers to Discharge Inaccessible home environment   Goals   Patient Goals to rest and get better   STG Expiration Date 10/27/19   PT Treatment Day 1   Plan   Treatment/Interventions Functional transfer training;LE strengthening/ROM; Therapeutic exercise; Endurance training;Patient/family training;Equipment eval/education; Bed mobility;Gait training   Progress Progressing toward goals   PT Frequency   (3-5x/week)   Recommendation   Recommendation Post acute IP rehab   Equipment Recommended Keyur Jones, PTA

## 2019-10-14 NOTE — PLAN OF CARE
Problem: Potential for Falls  Goal: Patient will remain free of falls  Description  INTERVENTIONS:  - Assess patient frequently for physical needs  -  Identify cognitive and physical deficits and behaviors that affect risk of falls    -  [place] fall precautions as indicated by assessment   - Educate patient/family on patient safety including physical limitations  - Instruct patient to call for assistance with activity based on assessment  - Modify environment to reduce risk of injury  - Consider OT/PT consult to assist with strengthening/mobility  10/14/2019 0711 by Kristel Corley RN  Outcome: Progressing  10/14/2019 0709 by Kristel Corley RN  Outcome: Progressing     Problem: PAIN - ADULT  Goal: Verbalizes/displays adequate comfort level or baseline comfort level  Description  Interventions:  - Encourage patient to monitor pain and request assistance  - Assess pain using appropriate pain scale  - Administer analgesics based on type and severity of pain and evaluate response  - Implement non-pharmacological measures as appropriate and evaluate response  - Consider cultural and social influences on pain and pain management  - Notify physician/advanced practitioner if interventions unsuccessful or patient reports new pain  10/14/2019 0711 by Kristel Corley RN  Outcome: Progressing  10/14/2019 0709 by Kristel Corley RN  Outcome: Progressing     Problem: INFECTION - ADULT  Goal: Absence or prevention of progression during hospitalization  Description  INTERVENTIONS:  - Assess and monitor for signs and symptoms of infection  - Monitor lab/diagnostic results  - Monitor all insertion sites, i e  indwelling lines, tubes, and drains  - Monitor endotracheal if appropriate and nasal secretions for changes in amount and color  - [place] appropriate cooling/warming therapies per order  - Administer medications as ordered  - Instruct and encourage patient and family to use good hand hygiene technique  - Identify and instruct in appropriate isolation precautions for identified infection/condition  10/14/2019 0711 by Austen Skelton RN  Outcome: Progressing  10/14/2019 0709 by Austen Skelton RN  Outcome: Progressing  Goal: Absence of fever/infection during neutropenic period  Description  INTERVENTIONS:  - Monitor WBC    10/14/2019 0711 by Austen Skelton RN  Outcome: Progressing  10/14/2019 0709 by Austen Skelton RN  Outcome: Progressing     Problem: SAFETY ADULT  Goal: Maintain or return to baseline ADL function  Description  INTERVENTIONS:  -  Assess patient's ability to carry out ADLs; assess patient's baseline for ADL function and identify physical deficits which impact ability to perform ADLs (bathing, care of mouth/teeth, toileting, grooming, dressing, etc )  - Assess/evaluate cause of self-care deficits   - Assess range of motion  - Assess patient's mobility; develop plan if impaired  - Assess patient's need for assistive devices and provide as appropriate  - Encourage maximum independence but intervene and supervise when necessary  - Involve family in performance of ADLs  - Assess for home care needs following discharge   - Consider OT consult to assist with ADL evaluation and planning for discharge  - Provide patient education as appropriate  10/14/2019 0711 by Austen Skelton RN  Outcome: Progressing  10/14/2019 0709 by Austen Skelton RN  Outcome: Progressing  Goal: Maintain or return mobility status to optimal level  Description  INTERVENTIONS:  - Assess patient's baseline mobility status (ambulation, transfers, stairs, etc )    - Identify cognitive and physical deficits and behaviors that affect mobility  - Identify mobility aids required to assist with transfers and/or ambulation (gait belt, sit-to-stand, lift, walker, cane, etc )  - [place] fall precautions as indicated by assessment  - Record patient progress and toleration of activity level on Mobility SBAR; progress patient to next Phase/Stage  - Instruct patient to call for assistance with activity based on assessment  - Consider rehabilitation consult to assist with strengthening/weightbearing, etc   10/14/2019 0711 by Caden Centeno RN  Outcome: Progressing  10/14/2019 0709 by Caden Centeno RN  Outcome: Progressing     Problem: DISCHARGE PLANNING  Goal: Discharge to home or other facility with appropriate resources  Description  INTERVENTIONS:  - Identify barriers to discharge w/patient and caregiver  - Arrange for needed discharge resources and transportation as appropriate  - Identify discharge learning needs (meds, wound care, etc )  - Arrange for interpretive services to assist at discharge as needed  - Refer to Case Management Department for coordinating discharge planning if the patient needs post-hospital services based on physician/advanced practitioner order or complex needs related to functional status, cognitive ability, or social support system  10/14/2019 0711 by Caden Centeno RN  Outcome: Progressing  10/14/2019 0709 by Caden Centeno RN  Outcome: Progressing

## 2019-10-14 NOTE — UTILIZATION REVIEW
Notification of Inpatient Admission/Inpatient Authorization Request  This is a Notification of Inpatient Admission/Request for Inpatient Authorization for our facility [facility]  Be advised that this patient was admitted to our facility under Inpatient Status  Please contact the Utilization Review Department where the patient is receiving care services for additional admission information  Facility: [facility] ([facility]")  Place of Service Code: 21   Place of Service Name: [address]  Presentation Date & Time: 10/12/2019 11:07 AM  Inpatient Admission Date & Time: 10/12/19 1502  Discharge Date & Time: No discharge date for patient encounter  Discharge Disposition (if discharged): Home with [facility]  Attending Physician and NPI#: Elda Rodriguez Md [[id number]]   Attending Physician:  BRYAN Malone  Specialty- General Surgery  [facility] ID- [id number]  [address] [place] [address]  Phone 1: [phone]  Fax: [phone]  Admission Orders (From admission, onward)     Ordered        10/12/19 1518  Inpatient Admission  Once                     [address] [place] [address] [place] Utilization Review Department  Phone: [phone]; Fax [phone]  [email or web address]  ATTENTION: Please call with any questions or concerns to [phone]  and carefully listen to the prompts so that you are directed to the right person  Send all requests for admission clinical reviews, approved or denied determinations and any other requests to fax [phone]   All voicemails are confidential

## 2019-10-15 ENCOUNTER — APPOINTMENT (OUTPATIENT)
Dept: PHYSICAL THERAPY | Age: 84
End: 2019-10-15
Payer: COMMERCIAL

## 2019-10-15 ENCOUNTER — APPOINTMENT (INPATIENT)
Dept: RADIOLOGY | Facility: HOSPITAL | Age: 84
DRG: 091 | End: 2019-10-15
Payer: COMMERCIAL

## 2019-10-15 LAB
ANION GAP SERPL CALCULATED.3IONS-SCNC: 7 MMOL/L (ref 4–13)
ANION GAP SERPL CALCULATED.3IONS-SCNC: 8 MMOL/L (ref 4–13)
BASOPHILS # BLD AUTO: 0.06 THOUSANDS/ΜL (ref 0–0.1)
BASOPHILS NFR BLD AUTO: 1 % (ref 0–1)
BUN SERPL-MCNC: 35 MG/DL (ref 5–25)
BUN SERPL-MCNC: 36 MG/DL (ref 5–25)
CALCIUM SERPL-MCNC: 8.9 MG/DL (ref 8.3–10.1)
CALCIUM SERPL-MCNC: 8.9 MG/DL (ref 8.3–10.1)
CHLORIDE SERPL-SCNC: 100 MMOL/L (ref 100–108)
CHLORIDE SERPL-SCNC: 101 MMOL/L (ref 100–108)
CO2 SERPL-SCNC: 27 MMOL/L (ref 21–32)
CO2 SERPL-SCNC: 30 MMOL/L (ref 21–32)
CREAT SERPL-MCNC: 1.52 MG/DL (ref 0.6–1.3)
CREAT SERPL-MCNC: 1.65 MG/DL (ref 0.6–1.3)
EOSINOPHIL # BLD AUTO: 0.07 THOUSAND/ΜL (ref 0–0.61)
EOSINOPHIL NFR BLD AUTO: 1 % (ref 0–6)
ERYTHROCYTE [DISTWIDTH] IN BLOOD BY AUTOMATED COUNT: 14.4 % (ref 11.6–15.1)
GFR SERPL CREATININE-BSD FRML MDRD: 28 ML/MIN/1.73SQ M
GFR SERPL CREATININE-BSD FRML MDRD: 31 ML/MIN/1.73SQ M
GLUCOSE SERPL-MCNC: 150 MG/DL (ref 65–140)
GLUCOSE SERPL-MCNC: 97 MG/DL (ref 65–140)
HCT VFR BLD AUTO: 27.3 % (ref 34.8–46.1)
HGB BLD-MCNC: 8.4 G/DL (ref 11.5–15.4)
IMM GRANULOCYTES # BLD AUTO: 0.02 THOUSAND/UL (ref 0–0.2)
IMM GRANULOCYTES NFR BLD AUTO: 0 % (ref 0–2)
LYMPHOCYTES # BLD AUTO: 1.64 THOUSANDS/ΜL (ref 0.6–4.47)
LYMPHOCYTES NFR BLD AUTO: 26 % (ref 14–44)
MAGNESIUM SERPL-MCNC: 2.5 MG/DL (ref 1.6–2.6)
MCH RBC QN AUTO: 31.9 PG (ref 26.8–34.3)
MCHC RBC AUTO-ENTMCNC: 30.8 G/DL (ref 31.4–37.4)
MCV RBC AUTO: 104 FL (ref 82–98)
MONOCYTES # BLD AUTO: 0.46 THOUSAND/ΜL (ref 0.17–1.22)
MONOCYTES NFR BLD AUTO: 7 % (ref 4–12)
NEUTROPHILS # BLD AUTO: 4.03 THOUSANDS/ΜL (ref 1.85–7.62)
NEUTS SEG NFR BLD AUTO: 65 % (ref 43–75)
NRBC BLD AUTO-RTO: 0 /100 WBCS
PHOSPHATE SERPL-MCNC: 2.9 MG/DL (ref 2.3–4.1)
PLATELET # BLD AUTO: 182 THOUSANDS/UL (ref 149–390)
PLATELET # BLD AUTO: 190 THOUSANDS/UL (ref 149–390)
PMV BLD AUTO: 9.4 FL (ref 8.9–12.7)
PMV BLD AUTO: 9.4 FL (ref 8.9–12.7)
POTASSIUM SERPL-SCNC: 3.7 MMOL/L (ref 3.5–5.3)
POTASSIUM SERPL-SCNC: 4.1 MMOL/L (ref 3.5–5.3)
RBC # BLD AUTO: 2.63 MILLION/UL (ref 3.81–5.12)
SODIUM SERPL-SCNC: 135 MMOL/L (ref 136–145)
SODIUM SERPL-SCNC: 138 MMOL/L (ref 136–145)
WBC # BLD AUTO: 6.28 THOUSAND/UL (ref 4.31–10.16)

## 2019-10-15 PROCEDURE — 71045 X-RAY EXAM CHEST 1 VIEW: CPT

## 2019-10-15 PROCEDURE — 84100 ASSAY OF PHOSPHORUS: CPT | Performed by: PHYSICIAN ASSISTANT

## 2019-10-15 PROCEDURE — 97116 GAIT TRAINING THERAPY: CPT

## 2019-10-15 PROCEDURE — 97530 THERAPEUTIC ACTIVITIES: CPT

## 2019-10-15 PROCEDURE — 80048 BASIC METABOLIC PNL TOTAL CA: CPT | Performed by: PHYSICIAN ASSISTANT

## 2019-10-15 PROCEDURE — 99232 SBSQ HOSP IP/OBS MODERATE 35: CPT | Performed by: PHYSICIAN ASSISTANT

## 2019-10-15 PROCEDURE — 97535 SELF CARE MNGMENT TRAINING: CPT

## 2019-10-15 PROCEDURE — 94640 AIRWAY INHALATION TREATMENT: CPT | Performed by: SOCIAL WORKER

## 2019-10-15 PROCEDURE — 85049 AUTOMATED PLATELET COUNT: CPT | Performed by: STUDENT IN AN ORGANIZED HEALTH CARE EDUCATION/TRAINING PROGRAM

## 2019-10-15 PROCEDURE — 85025 COMPLETE CBC W/AUTO DIFF WBC: CPT | Performed by: PHYSICIAN ASSISTANT

## 2019-10-15 PROCEDURE — 94760 N-INVAS EAR/PLS OXIMETRY 1: CPT | Performed by: SOCIAL WORKER

## 2019-10-15 PROCEDURE — 83735 ASSAY OF MAGNESIUM: CPT | Performed by: PHYSICIAN ASSISTANT

## 2019-10-15 RX ORDER — ALBUTEROL SULFATE 2.5 MG/3ML
SOLUTION RESPIRATORY (INHALATION)
Status: DISPENSED
Start: 2019-10-15 | End: 2019-10-16

## 2019-10-15 RX ORDER — FUROSEMIDE 10 MG/ML
20 INJECTION INTRAMUSCULAR; INTRAVENOUS ONCE
Status: DISCONTINUED | OUTPATIENT
Start: 2019-10-15 | End: 2019-10-15

## 2019-10-15 RX ORDER — FUROSEMIDE 40 MG/1
40 TABLET ORAL ONCE
Status: COMPLETED | OUTPATIENT
Start: 2019-10-15 | End: 2019-10-15

## 2019-10-15 RX ORDER — ALBUTEROL SULFATE 2.5 MG/3ML
2.5 SOLUTION RESPIRATORY (INHALATION) EVERY 4 HOURS PRN
Status: DISCONTINUED | OUTPATIENT
Start: 2019-10-15 | End: 2019-10-19 | Stop reason: HOSPADM

## 2019-10-15 RX ADMIN — FUROSEMIDE 40 MG: 40 TABLET ORAL at 17:04

## 2019-10-15 RX ADMIN — ACETAMINOPHEN 650 MG: 325 TABLET ORAL at 03:23

## 2019-10-15 RX ADMIN — SERTRALINE HYDROCHLORIDE 25 MG: 25 TABLET ORAL at 21:32

## 2019-10-15 RX ADMIN — LEVOTHYROXINE SODIUM 112 MCG: 112 TABLET ORAL at 09:35

## 2019-10-15 RX ADMIN — GABAPENTIN 300 MG: 300 CAPSULE ORAL at 21:32

## 2019-10-15 RX ADMIN — HEPARIN SODIUM 5000 UNITS: 5000 INJECTION INTRAVENOUS; SUBCUTANEOUS at 21:32

## 2019-10-15 RX ADMIN — ACETAMINOPHEN 650 MG: 325 TABLET ORAL at 14:33

## 2019-10-15 RX ADMIN — GABAPENTIN 300 MG: 300 CAPSULE ORAL at 16:25

## 2019-10-15 RX ADMIN — NYSTATIN: 100000 POWDER TOPICAL at 09:30

## 2019-10-15 RX ADMIN — LEVETIRACETAM 500 MG: 500 TABLET, FILM COATED ORAL at 17:04

## 2019-10-15 RX ADMIN — HEPARIN SODIUM 5000 UNITS: 5000 INJECTION INTRAVENOUS; SUBCUTANEOUS at 14:34

## 2019-10-15 RX ADMIN — FUROSEMIDE 10 MG: 20 TABLET ORAL at 09:36

## 2019-10-15 RX ADMIN — HEPARIN SODIUM 5000 UNITS: 5000 INJECTION INTRAVENOUS; SUBCUTANEOUS at 06:07

## 2019-10-15 RX ADMIN — METHOCARBAMOL TABLETS 500 MG: 500 TABLET, COATED ORAL at 07:30

## 2019-10-15 RX ADMIN — METOPROLOL TARTRATE 25 MG: 25 TABLET, FILM COATED ORAL at 17:04

## 2019-10-15 RX ADMIN — AMLODIPINE BESYLATE 5 MG: 5 TABLET ORAL at 09:35

## 2019-10-15 RX ADMIN — METOPROLOL TARTRATE 25 MG: 25 TABLET, FILM COATED ORAL at 09:35

## 2019-10-15 RX ADMIN — GABAPENTIN 300 MG: 300 CAPSULE ORAL at 09:35

## 2019-10-15 RX ADMIN — LEVETIRACETAM 500 MG: 500 TABLET, FILM COATED ORAL at 09:35

## 2019-10-15 RX ADMIN — METHOCARBAMOL TABLETS 500 MG: 500 TABLET, COATED ORAL at 21:32

## 2019-10-15 RX ADMIN — ACETAMINOPHEN 650 MG: 325 TABLET ORAL at 21:32

## 2019-10-15 RX ADMIN — ALBUTEROL SULFATE 2.5 MG: 2.5 SOLUTION RESPIRATORY (INHALATION) at 13:11

## 2019-10-15 RX ADMIN — NYSTATIN: 100000 POWDER TOPICAL at 17:04

## 2019-10-15 NOTE — PLAN OF CARE
Problem: Potential for Falls  Goal: Patient will remain free of falls  Description  INTERVENTIONS:  - Assess patient frequently for physical needs  -  Identify cognitive and physical deficits and behaviors that affect risk of falls    -  [place] fall precautions as indicated by assessment   - Educate patient/family on patient safety including physical limitations  - Instruct patient to call for assistance with activity based on assessment  - Modify environment to reduce risk of injury  - Consider OT/PT consult to assist with strengthening/mobility  Outcome: Progressing     Problem: PAIN - ADULT  Goal: Verbalizes/displays adequate comfort level or baseline comfort level  Description  Interventions:  - Encourage patient to monitor pain and request assistance  - Assess pain using appropriate pain scale  - Administer analgesics based on type and severity of pain and evaluate response  - Implement non-pharmacological measures as appropriate and evaluate response  - Consider cultural and social influences on pain and pain management  - Notify physician/advanced practitioner if interventions unsuccessful or patient reports new pain  Outcome: Progressing     Problem: INFECTION - ADULT  Goal: Absence or prevention of progression during hospitalization  Description  INTERVENTIONS:  - Assess and monitor for signs and symptoms of infection  - Monitor lab/diagnostic results  - Monitor all insertion sites, i e  indwelling lines, tubes, and drains  - Monitor endotracheal if appropriate and nasal secretions for changes in amount and color  - [place] appropriate cooling/warming therapies per order  - Administer medications as ordered  - Instruct and encourage patient and family to use good hand hygiene technique  - Identify and instruct in appropriate isolation precautions for identified infection/condition  Outcome: Progressing  Goal: Absence of fever/infection during neutropenic period  Description  INTERVENTIONS:  - Monitor WBC    Outcome: Progressing     Problem: SAFETY ADULT  Goal: Maintain or return to baseline ADL function  Description  INTERVENTIONS:  -  Assess patient's ability to carry out ADLs; assess patient's baseline for ADL function and identify physical deficits which impact ability to perform ADLs (bathing, care of mouth/teeth, toileting, grooming, dressing, etc )  - Assess/evaluate cause of self-care deficits   - Assess range of motion  - Assess patient's mobility; develop plan if impaired  - Assess patient's need for assistive devices and provide as appropriate  - Encourage maximum independence but intervene and supervise when necessary  - Involve family in performance of ADLs  - Assess for home care needs following discharge   - Consider OT consult to assist with ADL evaluation and planning for discharge  - Provide patient education as appropriate  Outcome: Progressing  Goal: Maintain or return mobility status to optimal level  Description  INTERVENTIONS:  - Assess patient's baseline mobility status (ambulation, transfers, stairs, etc )    - Identify cognitive and physical deficits and behaviors that affect mobility  - Identify mobility aids required to assist with transfers and/or ambulation (gait belt, sit-to-stand, lift, walker, cane, etc )  - [place] fall precautions as indicated by assessment  - Record patient progress and toleration of activity level on Mobility SBAR; progress patient to next Phase/Stage  - Instruct patient to call for assistance with activity based on assessment  - Consider rehabilitation consult to assist with strengthening/weightbearing, etc   Outcome: Progressing     Problem: DISCHARGE PLANNING  Goal: Discharge to home or other facility with appropriate resources  Description  INTERVENTIONS:  - Identify barriers to discharge w/patient and caregiver  - Arrange for needed discharge resources and transportation as appropriate  - Identify discharge learning needs (meds, wound care, etc )  - Arrange for interpretive services to assist at discharge as needed  - Refer to Case Management Department for coordinating discharge planning if the patient needs post-hospital services based on physician/advanced practitioner order or complex needs related to functional status, cognitive ability, or social support system  Outcome: Progressing     Problem: SKIN/TISSUE INTEGRITY - ADULT  Goal: Skin integrity remains intact  Description  INTERVENTIONS  - Identify patients at risk for skin breakdown  - Assess and monitor skin integrity  - Assess and monitor nutrition and hydration status  - Monitor labs (i e  albumin)  - Assess for incontinence   - Turn and reposition patient  - Assist with mobility/ambulation  - Relieve pressure over bony prominences  - Avoid friction and shearing  - Provide appropriate hygiene as needed including keeping skin clean and dry  - Evaluate need for skin moisturizer/barrier cream  - Collaborate with interdisciplinary team (i e  Nutrition, Rehabilitation, etc )   - Patient/family teaching  Outcome: Progressing  Goal: Incision(s), wounds(s) or drain site(s) healing without S/S of infection  Description  INTERVENTIONS  - Assess and document risk factors for skin impairment   - Assess and document dressing, incision, wound bed, drain sites and surrounding tissue  - Consider nutrition services referral as needed  - Oral mucous membranes remain intact  - Provide patient/ family education  Outcome: Progressing  Goal: Oral mucous membranes remain intact  Description  INTERVENTIONS  - Assess oral mucosa and hygiene practices  - Implement preventative oral hygiene regimen  - Implement oral medicated treatments as ordered  - Initiate Nutrition services referral as needed  Outcome: Progressing     Problem: HEMATOLOGIC - ADULT  Goal: Maintains hematologic stability  Description  INTERVENTIONS  - Assess for signs and symptoms of bleeding or hemorrhage  - Monitor labs  - Administer supportive blood products/factors as ordered and appropriate  Outcome: Progressing     Problem: MUSCULOSKELETAL - ADULT  Goal: Maintain or return mobility to safest level of function  Description  INTERVENTIONS:  - Assess patient's ability to carry out ADLs; assess patient's baseline for ADL function and identify physical deficits which impact ability to perform ADLs (bathing, care of mouth/teeth, toileting, grooming, dressing, etc )  - Assess/evaluate cause of self-care deficits   - Assess range of motion  - Assess patient's mobility  - Assess patient's need for assistive devices and provide as appropriate  - Encourage maximum independence but intervene and supervise when necessary  - Involve family in performance of ADLs  - Assess for home care needs following discharge   - Consider OT consult to assist with ADL evaluation and planning for discharge  - Provide patient education as appropriate  Outcome: Progressing  Goal: Maintain proper alignment of affected body part  Description  INTERVENTIONS:  - Support, maintain and protect limb and body alignment  - Provide patient/ family with appropriate education  Outcome: Progressing

## 2019-10-15 NOTE — ASSESSMENT & PLAN NOTE
- recently discharged 10/10/19  - no new hemorrhage on head CT 10/12/19  - continue to monitor overall clinical status  - GCS 15, stable neurovascular exam

## 2019-10-15 NOTE — PHYSICAL THERAPY NOTE
Physical Therapy Treatment Note     10/15/19 4268   Pain Assessment   Pain Assessment 0-10   Pain Type Chronic pain   Pain Location Back;Neck   Pain Orientation Bilateral   Restrictions/Precautions   Weight Bearing Precautions Per Order No   Other Precautions Fall Risk; Chair Alarm;Pain;Telemetry   General   Chart Reviewed Yes   Family/Caregiver Present No   Cognition   Overall Cognitive Status WFL   Subjective   Subjective Agreeable to PT  Seate on recliner upon entry   Transfers   Sit to Stand 4  Minimal assistance   Additional items Assist x 1; Increased time required;Armrests   Stand to Sit 4  Minimal assistance   Additional items Assist x 1; Armrests; Increased time required   Stand pivot 4  Minimal assistance   Additional items Assist x 1   Ambulation/Elevation   Gait pattern Decreased foot clearance; Wide JEFE; Inconsistent cande; Excessively slow; Short stride   Gait Assistance 4  Minimal assist   Additional items Assist x 1;Assist x 2;Other (Comment)   Assistive Device Rolling walker   Distance 20ft, 45ft   Balance   Static Sitting Fair +   Ambulatory Fair -   Endurance Deficit   Endurance Deficit Yes   Endurance Deficit Description SOB and fatigue   Activity Tolerance   Activity Tolerance Patient limited by fatigue   Nurse Made Aware Yes   Exercises   THR Sitting;Bilateral;AROM;10 reps   Assessment   Prognosis Fair   Problem List Decreased strength;Decreased range of motion;Decreased endurance;Decreased mobility;Pain   Assessment Pt  is limited due to labored breathing and fatigue  Pt  on 2L o2 however was 100%  Per RN O2 was removed during session and patient remained on RA t/o and post session  Rn was informed about the SpO2 Stats and patient remined at 93-96% t/o session on RA  Gait noted to be with slow and short cande and needed seated rest between ambualtion due to fatigue   pt  need further skilled PT to improve mobility and endurance and decrease care giver burden   Barriers to Discharge Inaccessible home environment   Goals   Patient Goals None reported   STG Expiration Date 10/27/19   PT Treatment Day 2   Plan   Treatment/Interventions Functional transfer training;LE strengthening/ROM; Therapeutic exercise;Patient/family training;Gait training;Equipment eval/education;Spoke to nursing   Progress Progressing toward goals   PT Frequency Other (Comment)  (3-5x/week)   Recommendation   Recommendation Post acute IP rehab   Equipment Recommended Walker   PT - OK to Discharge Yes         Ruiz , PTA

## 2019-10-15 NOTE — OCCUPATIONAL THERAPY NOTE
Occupational Therapy Treatment Note:       10/15/19 1035   Restrictions/Precautions   Other Precautions Chair Alarm   Pain Assessment   Pain Assessment FLACC   Pain Rating: FLACC (Rest) - Face 0   Pain Rating: FLACC (Rest) - Legs 0   Pain Rating: FLACC (Rest) - Activity 0   Pain Rating: FLACC (Rest) - Cry 0   Pain Rating: FLACC (Rest) - Consolability 0   Score: FLACC (Rest) 0   Pain Rating: FLACC (Activity) - Face 0   Pain Rating: FLACC (Activity) - Legs 0   Pain Rating: FLACC (Activity) - Activity 0   Pain Rating: FLACC (Activity) - Cry 0   Pain Rating: FLACC (Activity) - Consolability 0   Score: FLACC (Activity) 0   ADL   Grooming Assistance 5  Supervision/Setup   UB Bathing Comments nsg reports asst for lb adls   LB Dressing Assistance 4  Minimal Assistance   LB Dressing Deficit Use of adaptive equipment   LB Dressing Comments seated to oneyda socks  pt wears compression hose pta, deppends pta and sits on taller commode for dressing  pt reports using a sock aide different than rigid plastic and a dressing stick  pt also has reacher for retrieving items from floor   Toileting Comments pt uses loreto wick external catheter with + spillage noted  pt is incontinent of urine as she wears deppends pta   Functional Standing Tolerance   Time f- c clothing management   Transfers   Sit to Stand 4  Minimal assistance   Additional items Assist x 1  (rw)   Stand to Sit 4  Minimal assistance   Stand pivot 4  Minimal assistance   Additional items Assist x 1   Cognition   Arousal/Participation Cooperative   Attention Within functional limits   Memory Decreased recall of precautions   Following Commands Follows one step commands without difficulty   Comments min vc's for safe rw use, improvement noted from previous session  pt still requires minimal cues for safety and carryover overall    pt on o2 via nasal canula   Activity Tolerance   Activity Tolerance Patient tolerated treatment well   Assessment   Assessment pt participated in am ot session and was seen focusing on adl's lb dressing using lhae and sit to stand form chair for clothing management  pt demosntates f- standing balance and was able to use ue's for clothing management  pt needing asst to apply cream to buttocks/ groin area in stance  pt with use of loreto wick when oob in chair for urine  Plan   Treatment Interventions ADL retraining;Functional transfer training; Activityengagement; Endurance training;Patient/family training;Equipment evaluation/education   Goal Expiration Date 10/27/19   OT Treatment Day 2   OT Frequency 3-5x/wk   Recommendation   OT Discharge Recommendation Short Term Rehab   Barthel Index   Feeding 10   Bathing 0   Grooming Score 0   Dressing Score 5   Bladder Score 5   Bowels Score 10   Toilet Use Score 5   Transfers (Bed/Chair) Score 10   Mobility (Level Surface) Score 0   Stairs Score 0   Barthel Index Score 45   Modified Dayton Scale   Modified Dayton Scale 4   April A SHAWN Jean-Baptiste   Pt with sob, noted during conversation  Pt on o2 this session   Pulse oxy remained wfl (upper 90's)

## 2019-10-15 NOTE — PROGRESS NOTES
Progress Note - Yoni Dates 1934, 80 y o  female MRN: [id number]    Unit/Bed#: [facility] 954-24 Encounter: [id number]    Primary Care Provider: Espinoza Martin DO   Date and time admitted to hospital: 10/12/2019 11:07 AM        Traumatic subdural hematoma without loss of consciousness ([facility] [address] )  Assessment & Plan  - recently discharged 10/10/19  - no new hemorrhage on head CT 10/12/19  - continue to monitor overall clinical status  - GCS 15, stable neurovascular exam    Acute kidney injury ([facility] [address] )  Assessment & Plan  - baseline appears to be 1 0-1 2  - will continue to trend kidney function  - a m  Labs pending  - repeat BMP showed creatinine of 1 55 today  - patient did required another dose of Lasix to 40 mg      Chronic diastolic heart failure (HCC)  Assessment & Plan  Wt Readings from Last 3 Encounters:   10/15/19 88 5 kg (195 lb)   10/10/19 84 3 kg (185 lb 14 4 oz)   10/08/19 84 1 kg (185 lb 6 5 oz)       - Lasix 40 mg given today  - O2 sats acceptable but patient with c/o SOB  - will give MDI  - chest x-ray shows likely some fluid overload with history of CHF  - respiratory consulted for evaluation of possible nebulizer treatments  - will continue to follow clinically    * Ambulatory dysfunction  Assessment & Plan  - PT/OT  - Gerontology consult  - rehab    DVT prophylaxis: SCDs and SQH  PT and OT: eval and treat    Disposition: Continue to follow clinically  A m  Labs pending  Forty of Lasix given this afternoon in the setting of possible fluid overload  Continue to follow fluid status closely  Monitor urinary output  Bedside rounds completed with nurse  SUBJECTIVE:  Chief Complaint: "No new complaints "    Subjective:  Patient resting in bed at this time  Does should have some accessory muscle use that may be her baseline  She does not appear to be in any other respiratory distress with no shortness of breath  Currently wearing nasal cannula  Satting appropriately    Patient denies any new fevers, chills, sweats  Denies any cough or congestion        OBJECTIVE:     Meds/Allergies     Current Facility-Administered Medications:     acetaminophen (TYLENOL) tablet 650 mg, 650 mg, Oral, Q6H PRN, Jimbo Edwards MD, 650 mg at 10/15/19 1433    albuterol (2 5 mg/3 mL) 0 083 % inhalation solution **ADS Override Pull**, , , ,     albuterol (PROVENTIL HFA,VENTOLIN HFA) inhaler 2 puff, 2 puff, Inhalation, Q4H PRN, Jimbo Edwards MD, 2 puff at 10/14/19 0800    albuterol inhalation solution 2 5 mg, 2 5 mg, Nebulization, Q4H PRN, Julia Rivera MD, 2 5 mg at 10/15/19 1311    amLODIPine (NORVASC) tablet 5 mg, 5 mg, Oral, QAM, Jimbo Edwards MD, 5 mg at 10/15/19 0935    docusate sodium (COLACE) capsule 100 mg, 100 mg, Oral, BID, Yecenia Lorenz PA-C, 100 mg at 10/14/19 1047    gabapentin (NEURONTIN) capsule 300 mg, 300 mg, Oral, TID, Jimbo Edwards MD, 300 mg at 10/15/19 1625    heparin (porcine) subcutaneous injection 5,000 Units, 5,000 Units, Subcutaneous, Q8H [address], 5,000 Units at 10/15/19 1434 **AND** [COMPLETED] Platelet count, , , Once, Jimbo Edwards MD    levETIRAcetam (KEPPRA) tablet 500 mg, 500 mg, Oral, BID, Jimbo Edwards MD, 500 mg at 10/15/19 1704    levothyroxine tablet 112 mcg, 112 mcg, Oral, Daily, Jimbo Edwards MD, 112 mcg at 10/15/19 0935    LORazepam (ATIVAN) tablet 0 5 mg, 0 5 mg, Oral, Q8H PRN, Jimbo Edwards MD, 0 5 mg at 10/12/19 2317    methocarbamol (ROBAXIN) tablet 500 mg, 500 mg, Oral, TID PRN, Jimbo Edwards MD, 500 mg at 10/15/19 0730    metoprolol tartrate (LOPRESSOR) tablet 25 mg, 25 mg, Oral, BID, Jimbo Edwards MD, 25 mg at 10/15/19 1704    nystatin (MYCOSTATIN) powder, , Topical, BID, Yecenia Lorenz PA-C    ondansetron [facility]) injection 4 mg, 4 mg, Intravenous, Q6H PRN, Jimbo Edwards MD    sertraline (ZOLOFT) tablet 25 mg, 25 mg, Oral, HS, Jimbo Edwards MD, 25 mg at 10/14/19 2304     Vitals:   Vitals:    10/15/19 1600   BP:    Pulse:    Resp:    Temp:    SpO2: 99%       Intake/Output:  I/O       10/14 0701 - 10/15 0700 10/15 0701 - 10/16 0700    P  O  880 560    Total Intake(mL/kg) 880 (9 9) 560 (6 3)    Urine (mL/kg/hr) 600 (0 3) 554 (0 5)    Stool 0     Total Output 600 554    Net +280 +6          Unmeasured Urine Occurrence 4 x     Unmeasured Stool Occurrence 3 x            Nutrition/GI Proph/Bowel Reg:  Continue current diet    Physical Exam:   GENERAL APPEARANCE:  No acute distress  NEURO:  Cranial nerves 2-12 intact, no focal deficits  HEENT:  Normocephalic, PERRLA  CV:  Regular rate and rhythm  LUNGS:  Appears to be somewhat fluid overloaded; no apparent crackles or wheezing however  GI:  Bowel sounds x4, nontender  :  No Heller  MSK:  +2 pulses on extremities, neurovascularly intact  SKIN:  Warm, dry, intact, no evidence of edema    Invasive Devices     Drain            External Urinary Catheter 2 days                 Lab Results:   Results: I have personally reviewed pertinent reports   , BMP/CMP:   Lab Results   Component Value Date    SODIUM 135 (L) 10/15/2019    K 3 7 10/15/2019     10/15/2019    CO2 27 10/15/2019    BUN 35 (H) 10/15/2019    CREATININE 1 52 (H) 10/15/2019    CALCIUM 8 9 10/15/2019    EGFR 31 10/15/2019    and CBC:   Lab Results   Component Value Date    WBC 6 28 10/15/2019    HGB 8 4 (L) 10/15/2019    HCT 27 3 (L) 10/15/2019     (H) 10/15/2019     10/15/2019    MCH 31 9 10/15/2019    MCHC 30 8 (L) 10/15/2019    RDW 14 4 10/15/2019    MPV 9 4 10/15/2019    NRBC 0 10/15/2019     Imaging/EKG Studies: Results: I have personally reviewed pertinent reports      Other Studies:  No other studies  VTE Prophylaxis:  SCDs and subcutaneous heparin

## 2019-10-15 NOTE — PLAN OF CARE
Problem: OCCUPATIONAL THERAPY ADULT  Goal: Performs self-care activities at highest level of function for planned discharge setting  See evaluation for individualized goals  Description  Treatment Interventions: ADL retraining, Functional transfer training, Endurance training, Patient/family training, Cognitive reorientation, Equipment evaluation/education, Compensatory technique education, Activityengagement          See flowsheet documentation for full assessment, interventions and recommendations  Outcome: Progressing  Note:   Limitation: Decreased ADL status, Decreased Safe judgement during ADL, Decreased endurance, Decreased self-care trans  Prognosis: Good, Fair  Assessment: pt participated in am ot session and was seen focusing on adl's lb dressing using lhae and sit to stand form chair for clothing management  pt demosntates f- standing balance and was able to use ue's for clothing management  pt needing asst to apply cream to buttocks/ groin area in stance  pt with use of loreto wick when oob in chair for urine       OT Discharge Recommendation: Short Term Rehab     April Aly [place]

## 2019-10-15 NOTE — ASSESSMENT & PLAN NOTE
Wt Readings from Last 3 Encounters:   10/15/19 88 5 kg (195 lb)   10/10/19 84 3 kg (185 lb 14 4 oz)   10/08/19 84 1 kg (185 lb 6 5 oz)       - Lasix 40 mg given today  - O2 sats acceptable but patient with c/o SOB  - will give MDI  - chest x-ray shows likely some fluid overload with history of CHF  - respiratory consulted for evaluation of possible nebulizer treatments  - will continue to follow clinically

## 2019-10-15 NOTE — ASSESSMENT & PLAN NOTE
- baseline appears to be 1 0-1 2  - will continue to trend kidney function  - a m   Labs pending  - repeat BMP showed creatinine of 1 55 today  - patient did required another dose of Lasix to 40 mg

## 2019-10-15 NOTE — NUTRITION
10/15/19 0917   Recommendations/Interventions   Interventions Diet: order adjustment  (Suggest diet change to 2gNa)

## 2019-10-15 NOTE — PLAN OF CARE
Problem: PHYSICAL THERAPY ADULT  Goal: Performs mobility at highest level of function for planned discharge setting  See evaluation for individualized goals  Description  Treatment/Interventions: Functional transfer training, LE strengthening/ROM, Therapeutic exercise, Gait training, Bed mobility, Endurance training  Equipment Recommended: Lenin Farooq       See flowsheet documentation for full assessment, interventions and recommendations  Outcome: Progressing  Note:   Prognosis: Fair  Problem List: Decreased strength, Decreased range of motion, Decreased endurance, Decreased mobility, Pain  Assessment: Pt  is limited due to labored breathing and fatigue  Pt  on 2L o2 however was 100%  Per RN O2 was removed during session and patient remained on RA t/o and post session  Rn was informed about the SpO2 Stats and patient remined at 93-96% t/o session on RA  Gait noted to be with slow and short cande and needed seated rest between ambualtion due to fatigue  pt  need further skilled PT to improve mobility and endurance and decrease care giver burden  Barriers to Discharge: Inaccessible home environment     Recommendation: Post acute IP rehab     PT - OK to Discharge: Yes    See flowsheet documentation for full assessment

## 2019-10-16 ENCOUNTER — APPOINTMENT (INPATIENT)
Dept: RADIOLOGY | Facility: HOSPITAL | Age: 84
DRG: 091 | End: 2019-10-16
Payer: COMMERCIAL

## 2019-10-16 ENCOUNTER — TELEPHONE (OUTPATIENT)
Dept: NEUROSURGERY | Facility: CLINIC | Age: 84
End: 2019-10-16

## 2019-10-16 PROBLEM — Z87.09 HISTORY OF COPD: Status: ACTIVE | Noted: 2019-10-16

## 2019-10-16 LAB
ANION GAP SERPL CALCULATED.3IONS-SCNC: 7 MMOL/L (ref 4–13)
ARTERIAL PATENCY WRIST A: YES
BASE EX.OXY STD BLDV CALC-SCNC: 34.6 % (ref 60–80)
BASE EXCESS BLDA CALC-SCNC: 1.3 MMOL/L
BASE EXCESS BLDV CALC-SCNC: 3.8 MMOL/L
BASOPHILS # BLD AUTO: 0.06 THOUSANDS/ΜL (ref 0–0.1)
BASOPHILS NFR BLD AUTO: 1 % (ref 0–1)
BUN SERPL-MCNC: 33 MG/DL (ref 5–25)
CALCIUM SERPL-MCNC: 8.9 MG/DL (ref 8.3–10.1)
CHLORIDE SERPL-SCNC: 100 MMOL/L (ref 100–108)
CO2 SERPL-SCNC: 28 MMOL/L (ref 21–32)
CREAT SERPL-MCNC: 1.41 MG/DL (ref 0.6–1.3)
EOSINOPHIL # BLD AUTO: 0.07 THOUSAND/ΜL (ref 0–0.61)
EOSINOPHIL NFR BLD AUTO: 1 % (ref 0–6)
ERYTHROCYTE [DISTWIDTH] IN BLOOD BY AUTOMATED COUNT: 14.5 % (ref 11.6–15.1)
GFR SERPL CREATININE-BSD FRML MDRD: 34 ML/MIN/1.73SQ M
GLUCOSE SERPL-MCNC: 84 MG/DL (ref 65–140)
HCO3 BLDA-SCNC: 27 MMOL/L (ref 22–28)
HCO3 BLDV-SCNC: 30.7 MMOL/L (ref 24–30)
HCT VFR BLD AUTO: 29.9 % (ref 34.8–46.1)
HGB BLD-MCNC: 9.3 G/DL (ref 11.5–15.4)
IMM GRANULOCYTES # BLD AUTO: 0.01 THOUSAND/UL (ref 0–0.2)
IMM GRANULOCYTES NFR BLD AUTO: 0 % (ref 0–2)
IPAP: 15
LYMPHOCYTES # BLD AUTO: 1.64 THOUSANDS/ΜL (ref 0.6–4.47)
LYMPHOCYTES NFR BLD AUTO: 29 % (ref 14–44)
MAGNESIUM SERPL-MCNC: 2.4 MG/DL (ref 1.6–2.6)
MCH RBC QN AUTO: 32.4 PG (ref 26.8–34.3)
MCHC RBC AUTO-ENTMCNC: 31.1 G/DL (ref 31.4–37.4)
MCV RBC AUTO: 104 FL (ref 82–98)
MONOCYTES # BLD AUTO: 0.43 THOUSAND/ΜL (ref 0.17–1.22)
MONOCYTES NFR BLD AUTO: 8 % (ref 4–12)
NASAL CANNULA: 2
NEUTROPHILS # BLD AUTO: 3.47 THOUSANDS/ΜL (ref 1.85–7.62)
NEUTS SEG NFR BLD AUTO: 61 % (ref 43–75)
NON VENT- BIPAP: ABNORMAL
NRBC BLD AUTO-RTO: 0 /100 WBCS
O2 CT BLDA-SCNC: 14.5 ML/DL (ref 16–23)
O2 CT BLDV-SCNC: 5 ML/DL
OXYHGB MFR BLDA: 94.8 % (ref 94–97)
PCO2 BLDA: 47.3 MM HG (ref 36–44)
PCO2 BLDV: 59.5 MM HG (ref 42–50)
PEEP MAX SETTING VENT: 5 CM[H2O]
PH BLDA: 7.37 [PH] (ref 7.35–7.45)
PH BLDV: 7.33 [PH] (ref 7.3–7.4)
PHOSPHATE SERPL-MCNC: 3.2 MG/DL (ref 2.3–4.1)
PLATELET # BLD AUTO: 209 THOUSANDS/UL (ref 149–390)
PMV BLD AUTO: 9.9 FL (ref 8.9–12.7)
PO2 BLDA: 87.7 MM HG (ref 75–129)
PO2 BLDV: 22.9 MM HG (ref 35–45)
POTASSIUM SERPL-SCNC: 4 MMOL/L (ref 3.5–5.3)
RBC # BLD AUTO: 2.87 MILLION/UL (ref 3.81–5.12)
SODIUM SERPL-SCNC: 135 MMOL/L (ref 136–145)
SPECIMEN SOURCE: ABNORMAL
VENT BIPAP FIO2: 30 %
WBC # BLD AUTO: 5.68 THOUSAND/UL (ref 4.31–10.16)

## 2019-10-16 PROCEDURE — 36556 INSERT NON-TUNNEL CV CATH: CPT | Performed by: EMERGENCY MEDICINE

## 2019-10-16 PROCEDURE — 71045 X-RAY EXAM CHEST 1 VIEW: CPT

## 2019-10-16 PROCEDURE — 94640 AIRWAY INHALATION TREATMENT: CPT

## 2019-10-16 PROCEDURE — NC001 PR NO CHARGE: Performed by: EMERGENCY MEDICINE

## 2019-10-16 PROCEDURE — 84100 ASSAY OF PHOSPHORUS: CPT | Performed by: PHYSICIAN ASSISTANT

## 2019-10-16 PROCEDURE — 82805 BLOOD GASES W/O2 SATURATION: CPT | Performed by: PHYSICIAN ASSISTANT

## 2019-10-16 PROCEDURE — 99232 SBSQ HOSP IP/OBS MODERATE 35: CPT | Performed by: SURGERY

## 2019-10-16 PROCEDURE — 80048 BASIC METABOLIC PNL TOTAL CA: CPT | Performed by: PHYSICIAN ASSISTANT

## 2019-10-16 PROCEDURE — 83735 ASSAY OF MAGNESIUM: CPT | Performed by: PHYSICIAN ASSISTANT

## 2019-10-16 PROCEDURE — 5A09357 ASSISTANCE WITH RESPIRATORY VENTILATION, LESS THAN 24 CONSECUTIVE HOURS, CONTINUOUS POSITIVE AIRWAY PRESSURE: ICD-10-PCS | Performed by: EMERGENCY MEDICINE

## 2019-10-16 PROCEDURE — 94660 CPAP INITIATION&MGMT: CPT

## 2019-10-16 PROCEDURE — 06HM33Z INSERTION OF INFUSION DEVICE INTO RIGHT FEMORAL VEIN, PERCUTANEOUS APPROACH: ICD-10-PCS | Performed by: EMERGENCY MEDICINE

## 2019-10-16 PROCEDURE — 85025 COMPLETE CBC W/AUTO DIFF WBC: CPT | Performed by: PHYSICIAN ASSISTANT

## 2019-10-16 PROCEDURE — 36600 WITHDRAWAL OF ARTERIAL BLOOD: CPT

## 2019-10-16 PROCEDURE — 82805 BLOOD GASES W/O2 SATURATION: CPT | Performed by: EMERGENCY MEDICINE

## 2019-10-16 PROCEDURE — 94760 N-INVAS EAR/PLS OXIMETRY 1: CPT

## 2019-10-16 RX ORDER — FUROSEMIDE 40 MG/1
40 TABLET ORAL ONCE
Status: COMPLETED | OUTPATIENT
Start: 2019-10-16 | End: 2019-10-16

## 2019-10-16 RX ORDER — TORSEMIDE 20 MG/1
40 TABLET ORAL DAILY
Status: DISCONTINUED | OUTPATIENT
Start: 2019-10-17 | End: 2019-10-19 | Stop reason: HOSPADM

## 2019-10-16 RX ORDER — FUROSEMIDE 40 MG/1
40 TABLET ORAL ONCE
Status: DISCONTINUED | OUTPATIENT
Start: 2019-10-16 | End: 2019-10-16

## 2019-10-16 RX ADMIN — SERTRALINE HYDROCHLORIDE 25 MG: 25 TABLET ORAL at 21:51

## 2019-10-16 RX ADMIN — HEPARIN SODIUM 5000 UNITS: 5000 INJECTION INTRAVENOUS; SUBCUTANEOUS at 05:07

## 2019-10-16 RX ADMIN — METOPROLOL TARTRATE 25 MG: 25 TABLET, FILM COATED ORAL at 08:21

## 2019-10-16 RX ADMIN — NYSTATIN: 100000 POWDER TOPICAL at 17:45

## 2019-10-16 RX ADMIN — ALBUTEROL SULFATE 5 MG: 2.5 SOLUTION RESPIRATORY (INHALATION) at 17:45

## 2019-10-16 RX ADMIN — LEVETIRACETAM 500 MG: 500 TABLET, FILM COATED ORAL at 17:43

## 2019-10-16 RX ADMIN — GABAPENTIN 300 MG: 300 CAPSULE ORAL at 08:21

## 2019-10-16 RX ADMIN — GABAPENTIN 300 MG: 300 CAPSULE ORAL at 21:51

## 2019-10-16 RX ADMIN — IPRATROPIUM BROMIDE 1 MG: 0.5 SOLUTION RESPIRATORY (INHALATION) at 17:45

## 2019-10-16 RX ADMIN — ALBUTEROL SULFATE 2 PUFF: 90 AEROSOL, METERED RESPIRATORY (INHALATION) at 14:15

## 2019-10-16 RX ADMIN — ALBUTEROL SULFATE 2.5 MG: 2.5 SOLUTION RESPIRATORY (INHALATION) at 15:34

## 2019-10-16 RX ADMIN — FUROSEMIDE 40 MG: 40 TABLET ORAL at 15:42

## 2019-10-16 RX ADMIN — FUROSEMIDE 40 MG: 40 TABLET ORAL at 11:47

## 2019-10-16 RX ADMIN — ACETAMINOPHEN 650 MG: 325 TABLET ORAL at 05:07

## 2019-10-16 RX ADMIN — HEPARIN SODIUM 5000 UNITS: 5000 INJECTION INTRAVENOUS; SUBCUTANEOUS at 21:51

## 2019-10-16 RX ADMIN — ALBUTEROL SULFATE 2.5 MG: 2.5 SOLUTION RESPIRATORY (INHALATION) at 11:21

## 2019-10-16 RX ADMIN — HEPARIN SODIUM 5000 UNITS: 5000 INJECTION INTRAVENOUS; SUBCUTANEOUS at 13:17

## 2019-10-16 RX ADMIN — LEVETIRACETAM 500 MG: 500 TABLET, FILM COATED ORAL at 08:21

## 2019-10-16 RX ADMIN — GABAPENTIN 300 MG: 300 CAPSULE ORAL at 17:44

## 2019-10-16 RX ADMIN — ACETAMINOPHEN 650 MG: 325 TABLET ORAL at 14:16

## 2019-10-16 RX ADMIN — DOCUSATE SODIUM 100 MG: 100 CAPSULE, LIQUID FILLED ORAL at 08:21

## 2019-10-16 RX ADMIN — AMLODIPINE BESYLATE 5 MG: 5 TABLET ORAL at 08:21

## 2019-10-16 RX ADMIN — NYSTATIN: 100000 POWDER TOPICAL at 08:21

## 2019-10-16 RX ADMIN — METOPROLOL TARTRATE 25 MG: 25 TABLET, FILM COATED ORAL at 17:43

## 2019-10-16 RX ADMIN — LORAZEPAM 0.5 MG: 0.5 TABLET ORAL at 15:30

## 2019-10-16 RX ADMIN — LEVOTHYROXINE SODIUM 112 MCG: 112 TABLET ORAL at 08:21

## 2019-10-16 NOTE — ASSESSMENT & PLAN NOTE
Wt Readings from Last 3 Encounters:   10/15/19 88 5 kg (195 lb)   10/10/19 84 3 kg (185 lb 14 4 oz)   10/08/19 84 1 kg (185 lb 6 5 oz)       - Lasix 40 mg given today  - O2 sats acceptable but patient with c/o SOB  - will give MDI  - Repeat CXR today on  10/16/19  - ABG pending  - respiratory consulted for evaluation of possible nebulizer treatments  - will continue to follow clinically

## 2019-10-16 NOTE — TELEPHONE ENCOUNTER
10/21/2019-CALLED  [facility] AT PHONE#[phone], SPOKE TO SHAYNA, CONFIRMED 10/22/2019 CT AT [facility] APT AND 10/25/2019 F/U APT IN [place]  FACILITY MAY CALL BACK IN REGARDS TO APT'S      10/18/2019-PT STILL IN HOSPITAL    10/17/2019-PT Yani Werner    10/16/2019-ALVA (10/09/2019) SIGNED OFF  2 WEEK FOLLOW UP SCHEDULED WITH [facility]  PT WAS DISCHARGED ON 10/10/2019, BUT ADMITTED BACK IN [facility] ON 10/12/2019 AND IS STILL IN HOSPITAL    10/25/2019 APT W/CT HEAD  CT SCHEDULED ON 10/22/2019

## 2019-10-16 NOTE — QUICK NOTE
Called to assess patient at bedside this afternoon  She was noted to be in some respiratory distress secondary to feeling short of breath  She was placed on nasal cannula and her saturations improved to 98% from 88%  She was also re-evaluate for possible COPD exacerbation  The decision was made between the trauma team to follow-up with a repeat stat chest x-ray as well as an ABG  Patient was placed back into her chair  Patient had no audible wheezing appreciated on a evaluation  Will continue to follow closely  Will reassess for the possibility of giving another dose of Lasix this afternoon

## 2019-10-16 NOTE — PROGRESS NOTES
Progress Note - Williams Castellon 1934, 80 y o  female MRN: [id number]    [facility] Encounter: [id number]    Primary Care Provider: Kayla Flores DO   Date and time admitted to hospital: 10/12/2019 11:07 AM        History of COPD  Assessment & Plan  - repeat chest x-ray today  - ABG pending  - continue to monitor  - nasal cannula as needed  - does not wear oxygen at home  - respiratory consulted    Traumatic subdural hematoma without loss of consciousness ([facility] [address] )  Assessment & Plan  - recently discharged 10/10/19  - no new hemorrhage on head CT 10/12/19  - continue to monitor overall clinical status  - GCS 15, stable neurovascular exam    Acute kidney injury ([facility] [address] )  Assessment & Plan  - baseline appears to be 1 0-1 2  - will continue to trend kidney function  - a m  Labs pending  - repeat BMP showed creatinine of 1 4  - patient did required another dose of Lasix to 40 mg today      Chronic diastolic heart failure (HCC)  Assessment & Plan  Wt Readings from Last 3 Encounters:   10/15/19 88 5 kg (195 lb)   10/10/19 84 3 kg (185 lb 14 4 oz)   10/08/19 84 1 kg (185 lb 6 5 oz)       - Lasix 40 mg given today  - O2 sats acceptable but patient with c/o SOB  - will give MDI  - Repeat CXR today on  10/16/19  - ABG pending  - respiratory consulted for evaluation of possible nebulizer treatments  - will continue to follow clinically    * Ambulatory dysfunction  Assessment & Plan  - PT/OT  - Gerontology consult  - rehab    PT and OT: eval and treat  DVT prophylaxis: SCDs and SQH    Disposition:  Dispo planning  Patient at this time is not appropriate for discharge in the setting of respiratory distress  Chest x-ray pending  ABG ordered stat  Bedside rounds completed with nurse  SUBJECTIVE:  Chief Complaint: "I feel short of breath "    Subjective: Patient reports that she feels short of breath this afternoon  States that this all sudden occurred approximately an hour ago    States that she has no cough or congestion  Denies any new fevers, chills, sweats  Denies any new nausea or vomiting  OBJECTIVE:     Meds/Allergies     Current Facility-Administered Medications:     acetaminophen (TYLENOL) tablet 650 mg, 650 mg, Oral, Q6H PRN, Sandra Oscar MD, 650 mg at 10/16/19 1416    albuterol (PROVENTIL HFA,VENTOLIN HFA) inhaler 2 puff, 2 puff, Inhalation, Q4H PRN, Sandra Oscar MD, 2 puff at 10/16/19 1415    albuterol inhalation solution 2 5 mg, 2 5 mg, Nebulization, Q4H PRN, Abi Washington MD, 2 5 mg at 10/16/19 1121    amLODIPine (NORVASC) tablet 5 mg, 5 mg, Oral, QAM, Sandra Oscar MD, 5 mg at 10/16/19 0821    docusate sodium (COLACE) capsule 100 mg, 100 mg, Oral, BID, Pebbles Merlos PA-C, 100 mg at 10/16/19 7033    gabapentin (NEURONTIN) capsule 300 mg, 300 mg, Oral, TID, Sandra Oscar MD, 300 mg at 10/16/19 2404    heparin (porcine) subcutaneous injection 5,000 Units, 5,000 Units, Subcutaneous, Q8H [facility], 5,000 Units at 10/16/19 1317 **AND** [COMPLETED] Platelet count, , , Once, Sandra Oscar MD    levETIRAcetam (KEPPRA) tablet 500 mg, 500 mg, Oral, BID, Sandra Oscar MD, 500 mg at 10/16/19 5197    levothyroxine tablet 112 mcg, 112 mcg, Oral, Daily, Sandra Oscar MD, 112 mcg at 10/16/19 8205    LORazepam (ATIVAN) tablet 0 5 mg, 0 5 mg, Oral, Q8H PRN, Sandra Oscar MD, 0 5 mg at 10/12/19 2317    methocarbamol (ROBAXIN) tablet 500 mg, 500 mg, Oral, TID PRN, Sandra Oscar MD, 500 mg at 10/15/19 2132    metoprolol tartrate (LOPRESSOR) tablet 25 mg, 25 mg, Oral, BID, Sandra Oscar MD, 25 mg at 10/16/19 7104    nystatin (MYCOSTATIN) powder, , Topical, BID, Pebbles Merlos PA-C    ondansetron [facility]) injection 4 mg, 4 mg, Intravenous, Q6H PRN, Sandra Oscar MD    sertraline (ZOLOFT) tablet 25 mg, 25 mg, Oral, HS, Sandra Oscar MD, 25 mg at 10/15/19 2132     Vitals:   Vitals:    10/16/19 1122   BP:    Pulse:    Resp:    Temp:    SpO2: 99%       Intake/Output:  I/O       10/14 0701 - 10/15 0700 10/15 0701 - 10/16 0700 10/16 0701 - 10/17 0700    P  O  880 880 300    Total Intake(mL/kg) 880 (9 9) 880 (9 9) 300 (3 4)    Urine (mL/kg/hr) 600 (0 3) 1054 (0 5) 800 (1 2)    Stool 0 0 0    Total Output 600 1054 800    Net +280 -174 -500           Unmeasured Urine Occurrence 4 x      Unmeasured Stool Occurrence 3 x 2 x 1 x           Nutrition/GI Proph/Bowel Reg:  Continue current diet    Physical Exam:   GENERAL APPEARANCE:  No acute distress  NEURO:  Cranial nerves 2-12 intact, no focal deficits  HEENT:  Normocephalic  CV:  Regular rate and rhythm  LUNGS:  No audible wheezing at the bilateral bases; no rales or rhonchi  GI:  Bowel sounds x4, nontender  :  No Heller  MSK:  +2 pulses on extremities, no edema appreciated  SKIN:  Warm, dry, intact    Invasive Devices     Drain            External Urinary Catheter 2 days                 Lab Results:   Results: I have personally reviewed pertinent reports   , BMP/CMP:   Lab Results   Component Value Date    SODIUM 135 (L) 10/16/2019    K 4 0 10/16/2019     10/16/2019    CO2 28 10/16/2019    BUN 33 (H) 10/16/2019    CREATININE 1 41 (H) 10/16/2019    CALCIUM 8 9 10/16/2019    EGFR 34 10/16/2019    and CBC:   Lab Results   Component Value Date    WBC 5 68 10/16/2019    HGB 9 3 (L) 10/16/2019    HCT 29 9 (L) 10/16/2019     (H) 10/16/2019     10/16/2019    MCH 32 4 10/16/2019    MCHC 31 1 (L) 10/16/2019    RDW 14 5 10/16/2019    MPV 9 9 10/16/2019    NRBC 0 10/16/2019     Imaging/EKG Studies: Results: I have personally reviewed pertinent reports      Other Studies:  No other studies  VTE Prophylaxis:  SCDs and subcutaneous heparin

## 2019-10-16 NOTE — QUICK NOTE
Patient was re-evaluated after giving a dose of Lasix and chest x-ray and ABG were performed  Chest x-ray looks slightly worse from previous chest x-ray on 10/15/2019  At this point secondary to increased work of breathing as well as overall fluid status; we will transfer patient to the ICU a step-down level 1  Daughter is at bedside  ICU team made aware  Patient will be placed on BiPAP and IV access will be obtained

## 2019-10-16 NOTE — ASSESSMENT & PLAN NOTE
- baseline appears to be 1 0-1 2  - will continue to trend kidney function  - a m   Labs pending  - repeat BMP showed creatinine of 1 4  - patient did required another dose of Lasix to 40 mg today

## 2019-10-16 NOTE — SOCIAL WORK
Auth obtained for [facility]  Pt can d/c there once a bed is available  Possible today but more than likely tomorrow

## 2019-10-16 NOTE — PROGRESS NOTES
Daily Progress Note - Critical Care Acceptance Note   Rand Bejarano 80 y o  female MRN: [id number]  Unit/Bed#: [facility] 608-01 Encounter: [id number]        ----------------------------------------------------------------------------------------  HPI/24hr events: This is an 80year old female with history of COPD, grade 2 diastolic CHF with 22% EF, CVA, DM, HTN, HLD transferred to ICU for acute respiratory failure  Patient was initially admitted to trauma service for mechanical fall on Plavix, found to have a small SDH without any other injuries  Patient was admitted to trauma service from 10/12-16 for SDH with hospital course complicated by HERLINDA  She was being arranged for discharge to rehab today, however, prior to discharge she had a desat down to 88% with increased work of breathing and eventually required BiPAP     ---------------------------------------------------------------------------------------  SUBJECTIVE       Review of Systems   Constitutional: Negative  Negative for chills and fever  HENT: Negative  Negative for rhinorrhea  Eyes: Negative  Respiratory: Negative  Negative for cough and shortness of breath  Cardiovascular: Negative  Negative for chest pain and leg swelling  Gastrointestinal: Negative  Negative for abdominal pain, diarrhea, nausea and vomiting  Genitourinary: Negative  Negative for dysuria, flank pain and frequency  Musculoskeletal: Positive for back pain  Negative for neck pain  Skin: Negative  Negative for rash  Neurological: Negative  Negative for light-headedness and headaches  All other systems reviewed and are negative      Review of systems was reviewed and negative unless stated above in HPI/24-hour events   ---------------------------------------------------------------------------------------  Assessment and Plan:    Neuro:    Diagnosis: Small SDH, no observations, stable repeat head CT  o Keppra for seizure ppx   o GCS 15, Q2 neuro checks  Anxiety  o Scheduled home dose zoloft  o Ativan PRN   Analgesia:   o Scheduled gabapentin  o PRN Tylenol   o PRN Robaxin      CV:    Hx diastolic heart failure   - Grade 2 diastolic EF with EF 98%  - ABG: Ph 7 374, pCO2 47 3, pO2 87 7, bicarb 27, base excess 1 3   - Scheduled torsemide   - Lasix as needed for additional diuresis   - Home dose Norvasc, lopressor    Pulm:   Acute hypoxic respiratory failure, hx COPD  o On BiPAP 15/5  o Duonebs PRN   Respiratory    Lab Data (Last 4 hours)      10/16 1448            pH, Arterial       7 374           pCO2, Arterial       47 3           pO2, Arterial       87 7           HCO3, Arterial       27 0           Base Excess, Arterial       1 3                O2/Vent Data           Most Recent        Non-Invasive Ventilation Mode   BiPAP                  GI:    Abd soft, non-tender  o Zofran PRN  o Bowel regimen: Colace  o NPO while on BiPAP    :    HERLINDA  o CRT improving - down to 1 41 from 1 52 today    Monitor volume status and I&O  I/O       10/14 0701 - 10/15 0700 10/15 0701 - 10/16 0700 10/16 0701 - 10/17 0700    P  O  880 880 300    Total Intake(mL/kg) 880 (9 9) 880 (9 9) 300 (3 4)    Urine (mL/kg/hr) 600 (0 3) 1054 (0 5) 800 (0 9)    Stool 0 0 0    Total Output 600 1054 800    Net +280 -174 -500           Unmeasured Urine Occurrence 4 x      Unmeasured Stool Occurrence 3 x 2 x 1 x          F/E/N:    Replete electrolytes PRN   Holding fluids while diuresing     Heme/Onc:    Chronic anemia, stable    Hgb 9 3,    Continue to monitor     Endo:    Hypothyroidism  o Home dose levothyroxine     ID:    No issues, monitor for fever and leukocytosis    No obvious infiltrates on CXR but continue to monitor       MSK/Skin:    Frequent turning, offloading, skin checks    Disposition: Continue Critical Care   Code Status: Level 1 - Full Code  ---------------------------------------------------------------------------------------  ICU CORE MEASURES    Prophylaxis   VTE Pharmacologic Prophylaxis: Heparin  VTE Mechanical Prophylaxis: sequential compression device      Invasive Devices Review  Invasive Devices     Drain            External Urinary Catheter 3 days              Can any invasive devices be discontinued today? Not applicable  ---------------------------------------------------------------------------------------  OBJECTIVE    Physical Exam   Constitutional: She is oriented to person, place, and time  She appears well-developed and well-nourished  No distress  HENT:   Head: Normocephalic and atraumatic  Mouth/Throat: Oropharynx is clear and moist    Eyes: EOM are normal    Neck: Normal range of motion  Neck supple  Cardiovascular: Normal rate, regular rhythm, normal heart sounds and intact distal pulses  Exam reveals no gallop and no friction rub  No murmur heard  Pulmonary/Chest: No respiratory distress  She has wheezes (mild expiratory wheeze bilaterally but good air movement)  She has no rales  On BiPAP, breathing comfortably   Abdominal: Soft  There is no tenderness  There is no rebound and no guarding  Musculoskeletal: She exhibits no edema or tenderness  Neurological: She is alert and oriented to person, place, and time  No cranial nerve deficit  Clear fluent speech   Skin: Skin is warm and dry  Capillary refill takes less than 2 seconds  Psychiatric: She has a normal mood and affect  Nursing note and vitals reviewed        Vitals   Vitals:    10/16/19 1022 10/16/19 1122 10/16/19 1534 10/16/19 1536   BP:    159/79   BP Location:       Pulse: 55   64   Resp:    21   Temp:    (!) 96 7 °F (35 9 °C)   TempSrc:       SpO2: 97% 99% 97% (!) 89%   Weight:       Height:         Temp (24hrs), Av 7 °F (35 9 °C), Min:96 6 °F (35 9 °C), Max:96 7 °F (35 9 °C)  Current: Temperature: (!) 96 7 °F (35 9 °C)      Invasive/non-invasive ventilation settings   Respiratory    Lab Data (Last 4 hours)      10/16 1448            pH, Arterial       7 374           pCO2, Arterial       47 3           pO2, Arterial       87 7           HCO3, Arterial       27 0           Base Excess, Arterial       1 3                O2/Vent Data     None                Height and Weights   Height: 4' 11" (149 9 cm)  IBW: 43 2 kg  Body mass index is 39 39 kg/m²  Weight (last 2 days)     Date/Time   Weight    10/15/19 0600   88 5 (195)                Intake and Output  I/O       10/14 0701 - 10/15 0700 10/15 0701 - 10/16 0700 10/16 0701 - 10/17 0700    P  O  880 880 300    Total Intake(mL/kg) 880 (9 9) 880 (9 9) 300 (3 4)    Urine (mL/kg/hr) 600 (0 3) 1054 (0 5) 800 (1)    Stool 0 0 0    Total Output 600 1054 800    Net +280 -174 -500           Unmeasured Urine Occurrence 4 x      Unmeasured Stool Occurrence 3 x 2 x 1 x            Nutrition       Diet Orders   (From admission, onward)             Start     Ordered    10/15/19 1356  Diet Cardiovascular; Sodium 2 GM  Diet effective now     Question Answer Comment   Diet Type Cardiovascular    Cardiac Sodium 2 GM    RD to adjust diet per protocol?  Yes        10/15/19 1356              Laboratory and Diagnostics:  Results from last 7 days   Lab Units 10/16/19  0502 10/15/19  1131 10/15/19  0442 10/13/19  0755 10/10/19  0526   WBC Thousand/uL 5 68  --  6 28 5 53 5 52   HEMOGLOBIN g/dL 9 3*  --  8 4* 9 3* 9 4*   HEMATOCRIT % 29 9*  --  27 3* 28 7* 29 4*   PLATELETS Thousands/uL 209 190 182 165 167   NEUTROS PCT % 61  --  65 67  --    MONOS PCT % 8  --  7 7  --      Results from last 7 days   Lab Units 10/16/19  0502 10/15/19  1131 10/15/19  0442 10/13/19  0507 10/12/19  1220 10/10/19  0526   SODIUM mmol/L 135* 135* 138 133* 130* 136   POTASSIUM mmol/L 4 0 3 7 4 1 4 2 4 3 3 9   CHLORIDE mmol/L 100 100 101 101 93* 97*   CO2 mmol/L 28 27 30 24 29 31   ANION GAP mmol/L 7 8 7 8 8 8   BUN mg/dL 33* 35* 36* 39* 43* 51*   CREATININE mg/dL 1 41* 1 52* 1 65* 1 57* 1 87* 1 87*   CALCIUM mg/dL 8 9 8 9 8 9 9 2 10 3* 10 3*   GLUCOSE RANDOM mg/dL 84 150* 97 90 101 94     Results from last 7 days   Lab Units 10/16/19  0502 10/15/19  1131   MAGNESIUM mg/dL 2 4 2 5   PHOSPHORUS mg/dL 3 2 2 9                   ABG:  Results from last 7 days   Lab Units 10/16/19  1448   PH ART  7 374   PCO2 ART mm Hg 47 3*   PO2 ART mm Hg 87 7   HCO3 ART mmol/L 27 0   BASE EXC ART mmol/L 1 3   ABG SOURCE  Radial, Left     VBG:  Results from last 7 days   Lab Units 10/16/19  1448   ABG SOURCE  Radial, Left         Imaging: I have personally reviewed pertinent reports  Active Medications  Scheduled Meds:  Current Facility-Administered Medications:  acetaminophen 650 mg Oral Q6H PRN Mary Vanegas MD   albuterol 2 puff Inhalation Q4H PRN Mary Vanegas MD   albuterol 2 5 mg Nebulization Q4H PRN Alba Benavides MD   amLODIPine 5 mg Oral QAM Mary Vanegas MD   docusate sodium 100 mg Oral BID Nydia Mills PA-C   gabapentin 300 mg Oral TID Mary Vanegas MD   heparin (porcine) 5,000 Units Subcutaneous [facility] Mary Vanegas MD   levETIRAcetam 500 mg Oral BID Mary Vanegas MD   levothyroxine 112 mcg Oral Daily Mary Vanegas MD   LORazepam 0 5 mg Oral Q8H PRN Mary Vanegas MD   methocarbamol 500 mg Oral TID PRN Mary Vanegas MD   metoprolol tartrate 25 mg Oral BID Mary Vanegas MD   nystatin  Topical BID Nydia Mills PA-C   ondansetron 4 mg Intravenous Q6H PRN Mary Vanegas MD   sertraline 25 mg Oral HS Mary Vanegas MD     Continuous Infusions:     PRN Meds:     acetaminophen 650 mg Q6H PRN   albuterol 2 puff Q4H PRN   albuterol 2 5 mg Q4H PRN   LORazepam 0 5 mg Q8H PRN   methocarbamol 500 mg TID PRN   ondansetron 4 mg Q6H PRN       Allergies   Allergies   Allergen Reactions    Duloxetine Hcl Other (See Comments) and Hypertension    Duloxetine Hcl      Cymbalta;  Hemorrhagic stroke listed as reaction    Escitalopram      Other reaction(s): Urinary Retention    Pregabalin      Other reaction(s): Hypertension    Statins Myalgia    Tramadol      Other reaction(s): Hypertension    Triprolidine-Pse Other (See Comments)    Anastrozole Abdominal Pain    Anastrozole     Antihistamines, Diphenhydramine-Type     Exemestane      Aromasin; Muscle pain & cramps    Lexapro [Escitalopram]      Urinary retention    Lyrica [Pregabalin]      hypertension    Metformin      diarrhea    Oxycodone      Pt unsure      Statins      Muscle pain & cramps    Tramadol      hypertension    Triprolidine-Pseudoephedrine     Metformin Diarrhea       Advance Directive and Living Will: Yes    Power of : Yes  POLST:          Ximena Willis MD        Portions of the record may have been created with voice recognition software  Occasional wrong word or "sound a like" substitutions may have occurred due to the inherent limitations of voice recognition software    Read the chart carefully and recognize, using context, where substitutions have occurred

## 2019-10-16 NOTE — ASSESSMENT & PLAN NOTE
- repeat chest x-ray today  - ABG pending  - continue to monitor  - nasal cannula as needed  - does not wear oxygen at home  - respiratory consulted

## 2019-10-16 NOTE — PLAN OF CARE
Problem: Potential for Falls  Goal: Patient will remain free of falls  Description  INTERVENTIONS:  - Assess patient frequently for physical needs  -  Identify cognitive and physical deficits and behaviors that affect risk of falls    -  [place] fall precautions as indicated by assessment   - Educate patient/family on patient safety including physical limitations  - Instruct patient to call for assistance with activity based on assessment  - Modify environment to reduce risk of injury  - Consider OT/PT consult to assist with strengthening/mobility  Outcome: Progressing     Problem: PAIN - ADULT  Goal: Verbalizes/displays adequate comfort level or baseline comfort level  Description  Interventions:  - Encourage patient to monitor pain and request assistance  - Assess pain using appropriate pain scale  - Administer analgesics based on type and severity of pain and evaluate response  - Implement non-pharmacological measures as appropriate and evaluate response  - Consider cultural and social influences on pain and pain management  - Notify physician/advanced practitioner if interventions unsuccessful or patient reports new pain  Outcome: Progressing     Problem: INFECTION - ADULT  Goal: Absence or prevention of progression during hospitalization  Description  INTERVENTIONS:  - Assess and monitor for signs and symptoms of infection  - Monitor lab/diagnostic results  - Monitor all insertion sites, i e  indwelling lines, tubes, and drains  - Monitor endotracheal if appropriate and nasal secretions for changes in amount and color  - [place] appropriate cooling/warming therapies per order  - Administer medications as ordered  - Instruct and encourage patient and family to use good hand hygiene technique  - Identify and instruct in appropriate isolation precautions for identified infection/condition  Outcome: Progressing  Goal: Absence of fever/infection during neutropenic period  Description  INTERVENTIONS:  - Monitor WBC    Outcome: Progressing     Problem: SAFETY ADULT  Goal: Maintain or return to baseline ADL function  Description  INTERVENTIONS:  -  Assess patient's ability to carry out ADLs; assess patient's baseline for ADL function and identify physical deficits which impact ability to perform ADLs (bathing, care of mouth/teeth, toileting, grooming, dressing, etc )  - Assess/evaluate cause of self-care deficits   - Assess range of motion  - Assess patient's mobility; develop plan if impaired  - Assess patient's need for assistive devices and provide as appropriate  - Encourage maximum independence but intervene and supervise when necessary  - Involve family in performance of ADLs  - Assess for home care needs following discharge   - Consider OT consult to assist with ADL evaluation and planning for discharge  - Provide patient education as appropriate  Outcome: Progressing  Goal: Maintain or return mobility status to optimal level  Description  INTERVENTIONS:  - Assess patient's baseline mobility status (ambulation, transfers, stairs, etc )    - Identify cognitive and physical deficits and behaviors that affect mobility  - Identify mobility aids required to assist with transfers and/or ambulation (gait belt, sit-to-stand, lift, walker, cane, etc )  - [place] fall precautions as indicated by assessment  - Record patient progress and toleration of activity level on Mobility SBAR; progress patient to next Phase/Stage  - Instruct patient to call for assistance with activity based on assessment  - Consider rehabilitation consult to assist with strengthening/weightbearing, etc   Outcome: Progressing     Problem: DISCHARGE PLANNING  Goal: Discharge to home or other facility with appropriate resources  Description  INTERVENTIONS:  - Identify barriers to discharge w/patient and caregiver  - Arrange for needed discharge resources and transportation as appropriate  - Identify discharge learning needs (meds, wound care, etc )  - Arrange for interpretive services to assist at discharge as needed  - Refer to Case Management Department for coordinating discharge planning if the patient needs post-hospital services based on physician/advanced practitioner order or complex needs related to functional status, cognitive ability, or social support system  Outcome: Progressing     Problem: SKIN/TISSUE INTEGRITY - ADULT  Goal: Skin integrity remains intact  Description  INTERVENTIONS  - Identify patients at risk for skin breakdown  - Assess and monitor skin integrity  - Assess and monitor nutrition and hydration status  - Monitor labs (i e  albumin)  - Assess for incontinence   - Turn and reposition patient  - Assist with mobility/ambulation  - Relieve pressure over bony prominences  - Avoid friction and shearing  - Provide appropriate hygiene as needed including keeping skin clean and dry  - Evaluate need for skin moisturizer/barrier cream  - Collaborate with interdisciplinary team (i e  Nutrition, Rehabilitation, etc )   - Patient/family teaching  Outcome: Progressing  Goal: Incision(s), wounds(s) or drain site(s) healing without S/S of infection  Description  INTERVENTIONS  - Assess and document risk factors for skin impairment   - Assess and document dressing, incision, wound bed, drain sites and surrounding tissue  - Consider nutrition services referral as needed  - Oral mucous membranes remain intact  - Provide patient/ family education  Outcome: Progressing  Goal: Oral mucous membranes remain intact  Description  INTERVENTIONS  - Assess oral mucosa and hygiene practices  - Implement preventative oral hygiene regimen  - Implement oral medicated treatments as ordered  - Initiate Nutrition services referral as needed  Outcome: Progressing     Problem: HEMATOLOGIC - ADULT  Goal: Maintains hematologic stability  Description  INTERVENTIONS  - Assess for signs and symptoms of bleeding or hemorrhage  - Monitor labs  - Administer supportive blood products/factors as ordered and appropriate  Outcome: Progressing     Problem: MUSCULOSKELETAL - ADULT  Goal: Maintain or return mobility to safest level of function  Description  INTERVENTIONS:  - Assess patient's ability to carry out ADLs; assess patient's baseline for ADL function and identify physical deficits which impact ability to perform ADLs (bathing, care of mouth/teeth, toileting, grooming, dressing, etc )  - Assess/evaluate cause of self-care deficits   - Assess range of motion  - Assess patient's mobility  - Assess patient's need for assistive devices and provide as appropriate  - Encourage maximum independence but intervene and supervise when necessary  - Involve family in performance of ADLs  - Assess for home care needs following discharge   - Consider OT consult to assist with ADL evaluation and planning for discharge  - Provide patient education as appropriate  Outcome: Progressing  Goal: Maintain proper alignment of affected body part  Description  INTERVENTIONS:  - Support, maintain and protect limb and body alignment  - Provide patient/ family with appropriate education  Outcome: Progressing     Problem: Prexisting or High Potential for Compromised Skin Integrity  Goal: Skin integrity is maintained or improved  Description  INTERVENTIONS:  - Identify patients at risk for skin breakdown  - Assess and monitor skin integrity  - Assess and monitor nutrition and hydration status  - Monitor labs   - Assess for incontinence   - Turn and reposition patient  - Assist with mobility/ambulation  - Relieve pressure over bony prominences  - Avoid friction and shearing  - Provide appropriate hygiene as needed including keeping skin clean and dry  - Evaluate need for skin moisturizer/barrier cream  - Collaborate with interdisciplinary team   - Patient/family teaching  - Consider wound care consult   Outcome: Progressing

## 2019-10-17 ENCOUNTER — APPOINTMENT (INPATIENT)
Dept: RADIOLOGY | Facility: HOSPITAL | Age: 84
DRG: 091 | End: 2019-10-17
Payer: COMMERCIAL

## 2019-10-17 LAB
ANION GAP SERPL CALCULATED.3IONS-SCNC: 6 MMOL/L (ref 4–13)
BASOPHILS # BLD AUTO: 0.03 THOUSANDS/ΜL (ref 0–0.1)
BASOPHILS NFR BLD AUTO: 1 % (ref 0–1)
BUN SERPL-MCNC: 33 MG/DL (ref 5–25)
CALCIUM SERPL-MCNC: 8.6 MG/DL (ref 8.3–10.1)
CHLORIDE SERPL-SCNC: 103 MMOL/L (ref 100–108)
CO2 SERPL-SCNC: 30 MMOL/L (ref 21–32)
CREAT SERPL-MCNC: 1.5 MG/DL (ref 0.6–1.3)
EOSINOPHIL # BLD AUTO: 0.11 THOUSAND/ΜL (ref 0–0.61)
EOSINOPHIL NFR BLD AUTO: 2 % (ref 0–6)
ERYTHROCYTE [DISTWIDTH] IN BLOOD BY AUTOMATED COUNT: 14.7 % (ref 11.6–15.1)
GFR SERPL CREATININE-BSD FRML MDRD: 32 ML/MIN/1.73SQ M
GLUCOSE SERPL-MCNC: 176 MG/DL (ref 65–140)
GLUCOSE SERPL-MCNC: 89 MG/DL (ref 65–140)
HCT VFR BLD AUTO: 27.3 % (ref 34.8–46.1)
HGB BLD-MCNC: 8.7 G/DL (ref 11.5–15.4)
IMM GRANULOCYTES # BLD AUTO: 0.02 THOUSAND/UL (ref 0–0.2)
IMM GRANULOCYTES NFR BLD AUTO: 0 % (ref 0–2)
LYMPHOCYTES # BLD AUTO: 1.59 THOUSANDS/ΜL (ref 0.6–4.47)
LYMPHOCYTES NFR BLD AUTO: 26 % (ref 14–44)
MCH RBC QN AUTO: 33 PG (ref 26.8–34.3)
MCHC RBC AUTO-ENTMCNC: 31.9 G/DL (ref 31.4–37.4)
MCV RBC AUTO: 103 FL (ref 82–98)
MONOCYTES # BLD AUTO: 0.4 THOUSAND/ΜL (ref 0.17–1.22)
MONOCYTES NFR BLD AUTO: 7 % (ref 4–12)
NEUTROPHILS # BLD AUTO: 3.91 THOUSANDS/ΜL (ref 1.85–7.62)
NEUTS SEG NFR BLD AUTO: 64 % (ref 43–75)
NRBC BLD AUTO-RTO: 0 /100 WBCS
PLATELET # BLD AUTO: 186 THOUSANDS/UL (ref 149–390)
PMV BLD AUTO: 9.4 FL (ref 8.9–12.7)
POTASSIUM SERPL-SCNC: 4.5 MMOL/L (ref 3.5–5.3)
RBC # BLD AUTO: 2.64 MILLION/UL (ref 3.81–5.12)
SODIUM SERPL-SCNC: 139 MMOL/L (ref 136–145)
WBC # BLD AUTO: 6.06 THOUSAND/UL (ref 4.31–10.16)

## 2019-10-17 PROCEDURE — 80048 BASIC METABOLIC PNL TOTAL CA: CPT | Performed by: EMERGENCY MEDICINE

## 2019-10-17 PROCEDURE — 94640 AIRWAY INHALATION TREATMENT: CPT

## 2019-10-17 PROCEDURE — 94760 N-INVAS EAR/PLS OXIMETRY 1: CPT

## 2019-10-17 PROCEDURE — 94660 CPAP INITIATION&MGMT: CPT

## 2019-10-17 PROCEDURE — 71045 X-RAY EXAM CHEST 1 VIEW: CPT

## 2019-10-17 PROCEDURE — 99232 SBSQ HOSP IP/OBS MODERATE 35: CPT | Performed by: EMERGENCY MEDICINE

## 2019-10-17 PROCEDURE — NC001 PR NO CHARGE: Performed by: EMERGENCY MEDICINE

## 2019-10-17 PROCEDURE — 82948 REAGENT STRIP/BLOOD GLUCOSE: CPT

## 2019-10-17 PROCEDURE — 85025 COMPLETE CBC W/AUTO DIFF WBC: CPT | Performed by: EMERGENCY MEDICINE

## 2019-10-17 RX ORDER — ACETAMINOPHEN 325 MG/1
325 TABLET ORAL ONCE
Status: COMPLETED | OUTPATIENT
Start: 2019-10-17 | End: 2019-10-17

## 2019-10-17 RX ORDER — LEVALBUTEROL 1.25 MG/.5ML
1.25 SOLUTION, CONCENTRATE RESPIRATORY (INHALATION)
Status: DISCONTINUED | OUTPATIENT
Start: 2019-10-17 | End: 2019-10-19 | Stop reason: HOSPADM

## 2019-10-17 RX ORDER — FUROSEMIDE 10 MG/ML
40 INJECTION INTRAMUSCULAR; INTRAVENOUS ONCE
Status: COMPLETED | OUTPATIENT
Start: 2019-10-17 | End: 2019-10-17

## 2019-10-17 RX ADMIN — NYSTATIN: 100000 POWDER TOPICAL at 17:58

## 2019-10-17 RX ADMIN — GABAPENTIN 300 MG: 300 CAPSULE ORAL at 09:06

## 2019-10-17 RX ADMIN — METHOCARBAMOL TABLETS 500 MG: 500 TABLET, COATED ORAL at 10:16

## 2019-10-17 RX ADMIN — FUROSEMIDE 40 MG: 10 INJECTION, SOLUTION INTRAMUSCULAR; INTRAVENOUS at 15:51

## 2019-10-17 RX ADMIN — LEVALBUTEROL HYDROCHLORIDE 1.25 MG: 1.25 SOLUTION, CONCENTRATE RESPIRATORY (INHALATION) at 13:19

## 2019-10-17 RX ADMIN — ACETAMINOPHEN 650 MG: 325 TABLET ORAL at 10:16

## 2019-10-17 RX ADMIN — IPRATROPIUM BROMIDE 0.5 MG: 0.5 SOLUTION RESPIRATORY (INHALATION) at 19:52

## 2019-10-17 RX ADMIN — LEVALBUTEROL HYDROCHLORIDE 1.25 MG: 1.25 SOLUTION, CONCENTRATE RESPIRATORY (INHALATION) at 19:52

## 2019-10-17 RX ADMIN — ALBUTEROL SULFATE 2.5 MG: 2.5 SOLUTION RESPIRATORY (INHALATION) at 09:10

## 2019-10-17 RX ADMIN — HEPARIN SODIUM 5000 UNITS: 5000 INJECTION INTRAVENOUS; SUBCUTANEOUS at 14:35

## 2019-10-17 RX ADMIN — LEVETIRACETAM 500 MG: 500 TABLET, FILM COATED ORAL at 09:06

## 2019-10-17 RX ADMIN — NYSTATIN: 100000 POWDER TOPICAL at 09:08

## 2019-10-17 RX ADMIN — LEVETIRACETAM 500 MG: 500 TABLET, FILM COATED ORAL at 17:58

## 2019-10-17 RX ADMIN — ALBUTEROL SULFATE 2.5 MG: 2.5 SOLUTION RESPIRATORY (INHALATION) at 01:55

## 2019-10-17 RX ADMIN — HEPARIN SODIUM 5000 UNITS: 5000 INJECTION INTRAVENOUS; SUBCUTANEOUS at 21:06

## 2019-10-17 RX ADMIN — GABAPENTIN 300 MG: 300 CAPSULE ORAL at 21:06

## 2019-10-17 RX ADMIN — ACETAMINOPHEN 325 MG: 325 TABLET ORAL at 14:34

## 2019-10-17 RX ADMIN — GABAPENTIN 300 MG: 300 CAPSULE ORAL at 15:51

## 2019-10-17 RX ADMIN — IPRATROPIUM BROMIDE 0.5 MG: 0.5 SOLUTION RESPIRATORY (INHALATION) at 13:19

## 2019-10-17 RX ADMIN — SERTRALINE HYDROCHLORIDE 25 MG: 25 TABLET ORAL at 21:06

## 2019-10-17 RX ADMIN — FUROSEMIDE 40 MG: 10 INJECTION, SOLUTION INTRAMUSCULAR; INTRAVENOUS at 11:13

## 2019-10-17 RX ADMIN — HEPARIN SODIUM 5000 UNITS: 5000 INJECTION INTRAVENOUS; SUBCUTANEOUS at 06:40

## 2019-10-17 RX ADMIN — METOPROLOL TARTRATE 25 MG: 25 TABLET, FILM COATED ORAL at 17:58

## 2019-10-17 RX ADMIN — LEVOTHYROXINE SODIUM 112 MCG: 112 TABLET ORAL at 09:06

## 2019-10-17 NOTE — RESPIRATORY THERAPY NOTE
Resp Care      10/17/19 0802   Respiratory Assessment   Resp Comments Pt taken off bipap and placed on 5L nc humidified  Pt SpO2 100%      Additional Assessments   SpO2 100 %

## 2019-10-17 NOTE — PROGRESS NOTES
Progress Note - ICU Transfer to Step down/med  surg  - Milagro Colón 80 y o  female MRN: [id number]    Unit/Bed#: ICU 03 Encounter: [id number]      Code Status: Level 1 - Full Code    Date of ICU admission: 10/16    Reason for ICU adm: Small traumatic subdural bleed + acute hypoxic respiratory failure     Active problems:   Patient Active Problem List   Diagnosis    TIA (transient ischemic attack)    UTI (urinary tract infection)    Hypertension    Diabetes [facility] [address] [facility]    Hypothyroidism    HX: breast cancer    H/O: CVA (cerebrovascular accident)    Osteoporosis    Arthritis    Fibromyalgia    Bilateral malignant neoplasm of breast in female, estrogen receptor positive ([facility] [address] )    Closed nondisplaced fracture of second cervical vertebra (HCC)    Neck pain, acute    Type III fracture of odontoid process ([facility] [address] )    C6 cervical fracture ([facility] [address] [facility]    Essential hypertension    Hypothyroidism    Fall    Ambulatory dysfunction    Acute pain    Renovascular hypertension    Chronic diastolic heart failure (HCC)    History of CVA (cerebrovascular accident)    Type 2 diabetes mellitus with diabetic polyneuropathy (HCC)    Depression    Stage 3 chronic kidney disease (HCC)    Rheumatoid arthritis involving multiple sites ([facility] [address] [facility]    Fibromyalgia    History of aortic valve replacement with bioprosthetic valve    Renal artery stenosis (HCC)    Parenchymal renal hypertension    Pain    Lesion of left lung    Trigger point    Dermatitis associated with moisture    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    Elevated troponin    Acute respiratory failure with hypoxia (HCC)    Acute kidney injury (HCC)    Dysphagia    Traumatic subdural hematoma without loss of consciousness (HCC)    History of COPD       Summary of clinical course:  This is an 80year old female with history of COPD, grade 2 diastolic CHF with 69% EF, CVA, DM, HTN, HLD transferred to ICU for acute respiratory failure  Patient was initially admitted to trauma service for mechanical fall on Plavix, found to have a small SDH without any other injuries  Patient was admitted to trauma service from 10/12-16 for SDH with hospital course complicated by HERLINDA  She was being arranged for discharge to rehab today, however, prior to discharge she had a desat down to 88% with increased work of breathing and eventually required BiPAP  Patient was titrated off of BiPAP and doing well on nasal cannula  Suspect likely etiology of CHF exacerbation, for which patient was diuresed  ICU course was complicated by lack of peripheral access and a femoral CVC was placed  Recent or scheduled procedures:   10/17 CXR: Persistent cardiomegaly and central vascular congestion with small bilateral pleural effusions  Persistent patchy airspace densities in the right mid and lower lung  Right basilar pneumonia not excluded  Outstanding/pending diagnostics: N/A    Hospital discharge planning:      Neuro:   · Diagnosis: Small SDH, no observations, stable repeat head CT  ? Keppra for seizure ppx   ? GCS 15, Q2 neuro checks   · Anxiety  ? Scheduled home dose zoloft  ? Ativan PRN  · Analgesia:   ? Scheduled gabapentin  ? PRN Tylenol   ? PRN Robaxin       CV:   · Hx diastolic heart failure   - Grade 2 diastolic EF with EF 39%  - VBG last night: pH 7 33, pCO2 60, pO2 23, bicarb 30, SVO2 35  - Scheduled torsemide   - Lasix as needed for additional diuresis   · HTN  - Home dose Norvasc, lopressor     Pulm:  · Acute hypoxic respiratory failure, hx COPD and diastolic CHF  ? Improved - satting around 99% on NC  ? Feel more likely CHF than COPD given minimal wheezing, good air movement, and vascular congestion on CXR  ? Scheduled nebs   ? Scheduled torsemide, lasix as needed to diurese   ? CPAP at night     GI:   · Abd soft, non-tender  ? Zofran PRN  ? Bowel regimen: Colace  ? Cardiac diet     :   · HERLINDA  ?  CRT improving - was 2 6 on 10/8, down to 1 5 today   · Monitor volume status and I&O, has a purewick   · Labial hematoma - continue to monitor     F/E/N:   · Replete electrolytes PRN  · Holding fluids while diuresing      Heme/Onc:   · Chronic anemia, stable   · Hgb 8 7 (from 9 3),   · Continue to monitor   · On heparin for VTE ppx     Endo:   · Hypothyroidism  ?  Home dose levothyroxine      ID:   · No issues, monitor for fever and leukocytosis   · No obvious infiltrates on CXR but continue to monitor         MSK/Skin:   · Frequent turning, offloading, skin checks

## 2019-10-17 NOTE — RESPIRATORY THERAPY NOTE
RT Protocol Note  Candice Copeland 80 y o  female MRN: [id number]  Unit/Bed#: ICU 03 Encounter: [id number]    Assessment    Principal Problem:    Ambulatory dysfunction  Active Problems:    [facility]ronic [facility] )    Acute kidney injury ([place] [address] )    Traumatic subdural hematoma without loss of consciousness (HCC)    History of COPD      Home Pulmonary Medications:  2 puffs albuterol prn       Past Medical History:   Diagnosis Date    Arthritis     Breast cancer ([place] [address] ) 2015    Cardiac disease     aortic valve transplant    CHF (congestive heart failure) ([place] [address] )     Compression fracture of body of thoracic vertebra (HCC)     CVA (cerebral vascular accident) ([place] [address] )     Diabetes mellitus ([place] [address] )     Disease of thyroid gland     Fibromyalgia, primary     H/O cervical fracture 01/09/2019    Hyperlipidemia     Hypertension     Neuropathy     Pressure injury of skin     Renal disorder     kidney stent    Stroke [facility])      Social History     Socioeconomic History    Marital status:       Spouse name: Not on file    Number of children: 3    Years of education: Not on file    Highest education level: Not on file   Occupational History    Not on file   Social Needs    Financial resource strain: Not on file    Food insecurity:     Worry: Not on file     Inability: Not on file    Transportation needs:     Medical: Not on file     Non-medical: Not on file   Tobacco Use    Smoking status: Never Smoker    Smokeless tobacco: Never Used   Substance and Sexual Activity    Alcohol use: Not Currently     Frequency: Never     Binge frequency: Never    Drug use: No    Sexual activity: Not Currently   Lifestyle    Physical activity:     Days per week: Not on file     Minutes per session: Not on file    Stress: Not on file   Relationships    Social connections:     Talks on phone: Not on file     Gets together: Not on file     Attends Orthodox service: Not on file     Active member of club or organization: Not on file     Attends meetings of clubs or organizations: Not on file     Relationship status: Not on file    Intimate partner violence:     Fear of current or ex partner: Not on file     Emotionally abused: Not on file     Physically abused: Not on file     Forced sexual activity: Not on file   Other Topics Concern    Not on file   Social History Narrative    ** Merged History Encounter **            Subjective         Objective    Physical Exam:   Assessment Type: Assess only  General Appearance: Alert, Awake  Respiratory Pattern: Normal  Chest Assessment: Chest expansion symmetrical  Bilateral Breath Sounds: Crackles(in bases)    Vitals:  Blood pressure (!) 74/47, pulse 60, temperature 98 3 °F (36 8 °C), temperature source Oral, resp  rate 21, height 4' 11" (1 499 m), weight 86 2 kg (190 lb 0 6 oz), SpO2 100 %, not currently breastfeeding  Results from last 7 days   Lab Units 10/16/19  1448   PH ART  7 374   PCO2 ART mm Hg 47 3*   PO2 ART mm Hg 87 7   HCO3 ART mmol/L 27 0   BASE EXC ART mmol/L 1 3   O2 CONTENT ART mL/dL 14 5*   O2 HGB, ARTERIAL % 94 8   ABG SOURCE  Radial, Left   CHUCK TEST  Yes       Imaging and other studies: I have personally reviewed pertinent reports  O2 Device: bipap     Plan    Respiratory Plan: Mild Distress pathway        Resp Comments: Pt ordered [id number] and Q4 atrovent by NATHEN  Pt evaluated per respiratory protocal  She has crackles bibasilar and a faint wheeze in her upper airway  Pt states she has been coughing up clear secretions  She appears to be resting comfortably at this time, but states her breathing is "not good"  Albuterol will be changed to xopenex TID per protocal, and atrovent will be changed to TID as well  CXR shows mild vascular congestions

## 2019-10-17 NOTE — PLAN OF CARE
Problem: Potential for Falls  Goal: Patient will remain free of falls  Description  INTERVENTIONS:  - Assess patient frequently for physical needs  -  Identify cognitive and physical deficits and behaviors that affect risk of falls    -  Osteen fall precautions as indicated by assessment   - Educate patient/family on patient safety including physical limitations  - Instruct patient to call for assistance with activity based on assessment  - Modify environment to reduce risk of injury  - Consider OT/PT consult to assist with strengthening/mobility  10/17/2019 0730 by Kristian Schuster RN  Outcome: Progressing  10/17/2019 0729 by Kristian Schuster RN  Outcome: Progressing     Problem: PAIN - ADULT  Goal: Verbalizes/displays adequate comfort level or baseline comfort level  Description  Interventions:  - Encourage patient to monitor pain and request assistance  - Assess pain using appropriate pain scale  - Administer analgesics based on type and severity of pain and evaluate response  - Implement non-pharmacological measures as appropriate and evaluate response  - Consider cultural and social influences on pain and pain management  - Notify physician/advanced practitioner if interventions unsuccessful or patient reports new pain  10/17/2019 0730 by Kristian Schuster RN  Outcome: Progressing  10/17/2019 0729 by Kristian Schuster RN  Outcome: Progressing     Problem: INFECTION - ADULT  Goal: Absence or prevention of progression during hospitalization  Description  INTERVENTIONS:  - Assess and monitor for signs and symptoms of infection  - Monitor lab/diagnostic results  - Monitor all insertion sites, i e  indwelling lines, tubes, and drains  - Monitor endotracheal if appropriate and nasal secretions for changes in amount and color  - Osteen appropriate cooling/warming therapies per order  - Administer medications as ordered  - Instruct and encourage patient and family to use good hand hygiene technique  - Identify and instruct in appropriate isolation precautions for identified infection/condition  10/17/2019 0730 by Jayshree Iniguez RN  Outcome: Progressing  10/17/2019 0729 by Jayshree Iniguez RN  Outcome: Progressing  Goal: Absence of fever/infection during neutropenic period  Description  INTERVENTIONS:  - Monitor WBC    10/17/2019 0730 by Jayshree Iniguez RN  Outcome: Progressing  10/17/2019 0729 by Jayshree Iniguez RN  Outcome: Progressing     Problem: SAFETY ADULT  Goal: Maintain or return to baseline ADL function  Description  INTERVENTIONS:  -  Assess patient's ability to carry out ADLs; assess patient's baseline for ADL function and identify physical deficits which impact ability to perform ADLs (bathing, care of mouth/teeth, toileting, grooming, dressing, etc )  - Assess/evaluate cause of self-care deficits   - Assess range of motion  - Assess patient's mobility; develop plan if impaired  - Assess patient's need for assistive devices and provide as appropriate  - Encourage maximum independence but intervene and supervise when necessary  - Involve family in performance of ADLs  - Assess for home care needs following discharge   - Consider OT consult to assist with ADL evaluation and planning for discharge  - Provide patient education as appropriate  10/17/2019 0730 by Jayshree Iniguez RN  Outcome: Progressing  10/17/2019 0729 by Jayshree Iniguez RN  Outcome: Progressing  Goal: Maintain or return mobility status to optimal level  Description  INTERVENTIONS:  - Assess patient's baseline mobility status (ambulation, transfers, stairs, etc )    - Identify cognitive and physical deficits and behaviors that affect mobility  - Identify mobility aids required to assist with transfers and/or ambulation (gait belt, sit-to-stand, lift, walker, cane, etc )  - Baldwin fall precautions as indicated by assessment  - Record patient progress and toleration of activity level on Mobility SBAR; progress patient to next Phase/Stage  - Instruct patient to call for assistance with activity based on assessment  - Consider rehabilitation consult to assist with strengthening/weightbearing, etc   10/17/2019 0730 by Penelope Williamson RN  Outcome: Progressing  10/17/2019 0729 by Penelope Williamson RN  Outcome: Progressing     Problem: DISCHARGE PLANNING  Goal: Discharge to home or other facility with appropriate resources  Description  INTERVENTIONS:  - Identify barriers to discharge w/patient and caregiver  - Arrange for needed discharge resources and transportation as appropriate  - Identify discharge learning needs (meds, wound care, etc )  - Arrange for interpretive services to assist at discharge as needed  - Refer to Case Management Department for coordinating discharge planning if the patient needs post-hospital services based on physician/advanced practitioner order or complex needs related to functional status, cognitive ability, or social support system  10/17/2019 0730 by Penelope Williamson RN  Outcome: Progressing  10/17/2019 0729 by Penelope Williamson RN  Outcome: Progressing     Problem: SKIN/TISSUE INTEGRITY - ADULT  Goal: Skin integrity remains intact  Description  INTERVENTIONS  - Identify patients at risk for skin breakdown  - Assess and monitor skin integrity  - Assess and monitor nutrition and hydration status  - Monitor labs (i e  albumin)  - Assess for incontinence   - Turn and reposition patient  - Assist with mobility/ambulation  - Relieve pressure over bony prominences  - Avoid friction and shearing  - Provide appropriate hygiene as needed including keeping skin clean and dry  - Evaluate need for skin moisturizer/barrier cream  - Collaborate with interdisciplinary team (i e  Nutrition, Rehabilitation, etc )   - Patient/family teaching  10/17/2019 0730 by Penelope Williamson RN  Outcome: Progressing  10/17/2019 0729 by Penelope Williamson RN  Outcome: Progressing  Goal: Incision(s), wounds(s) or drain site(s) healing without S/S of infection  Description  INTERVENTIONS  - Assess and document risk factors for skin impairment   - Assess and document dressing, incision, wound bed, drain sites and surrounding tissue  - Consider nutrition services referral as needed  - Oral mucous membranes remain intact  - Provide patient/ family education  10/17/2019 0730 by Horacio Gagnon RN  Outcome: Progressing  10/17/2019 0729 by Horacio Gagnon RN  Outcome: Progressing  Goal: Oral mucous membranes remain intact  Description  INTERVENTIONS  - Assess oral mucosa and hygiene practices  - Implement preventative oral hygiene regimen  - Implement oral medicated treatments as ordered  - Initiate Nutrition services referral as needed  10/17/2019 0730 by Horacio Gagnon RN  Outcome: Progressing  10/17/2019 0729 by Horacio Gagnon RN  Outcome: Progressing     Problem: HEMATOLOGIC - ADULT  Goal: Maintains hematologic stability  Description  INTERVENTIONS  - Assess for signs and symptoms of bleeding or hemorrhage  - Monitor labs  - Administer supportive blood products/factors as ordered and appropriate  10/17/2019 0730 by Horacio Gagnon RN  Outcome: Progressing  10/17/2019 0729 by Horacio Gagnon RN  Outcome: Progressing     Problem: MUSCULOSKELETAL - ADULT  Goal: Maintain or return mobility to safest level of function  Description  INTERVENTIONS:  - Assess patient's ability to carry out ADLs; assess patient's baseline for ADL function and identify physical deficits which impact ability to perform ADLs (bathing, care of mouth/teeth, toileting, grooming, dressing, etc )  - Assess/evaluate cause of self-care deficits   - Assess range of motion  - Assess patient's mobility  - Assess patient's need for assistive devices and provide as appropriate  - Encourage maximum independence but intervene and supervise when necessary  - Involve family in performance of ADLs  - Assess for home care needs following discharge   - Consider OT consult to assist with ADL evaluation and planning for discharge  - Provide patient education as appropriate  10/17/2019 0730 by Topher Herbert RN  Outcome: Progressing  10/17/2019 0729 by Topher Herbert RN  Outcome: Progressing  Goal: Maintain proper alignment of affected body part  Description  INTERVENTIONS:  - Support, maintain and protect limb and body alignment  - Provide patient/ family with appropriate education  10/17/2019 0730 by Topher Herbert RN  Outcome: Progressing  10/17/2019 0729 by Topher Herbert RN  Outcome: Progressing     Problem: Prexisting or High Potential for Compromised Skin Integrity  Goal: Skin integrity is maintained or improved  Description  INTERVENTIONS:  - Identify patients at risk for skin breakdown  - Assess and monitor skin integrity  - Assess and monitor nutrition and hydration status  - Monitor labs   - Assess for incontinence   - Turn and reposition patient  - Assist with mobility/ambulation  - Relieve pressure over bony prominences  - Avoid friction and shearing  - Provide appropriate hygiene as needed including keeping skin clean and dry  - Evaluate need for skin moisturizer/barrier cream  - Collaborate with interdisciplinary team   - Patient/family teaching  - Consider wound care consult   10/17/2019 0730 by Topher Herbert RN  Outcome: Progressing  10/17/2019 0729 by Topher Herbert RN  Outcome: Progressing     Problem: RESPIRATORY - ADULT  Goal: Achieves optimal ventilation and oxygenation  Description  INTERVENTIONS:  - Assess for changes in respiratory status  - Assess for changes in mentation and behavior  - Position to facilitate oxygenation and minimize respiratory effort  - Oxygen administered by appropriate delivery if ordered  - Initiate smoking cessation education as indicated  - Encourage broncho-pulmonary hygiene including cough, deep breathe, Incentive Spirometry  - Assess the need for suctioning and aspirate as needed  - Assess and instruct to report SOB or any respiratory difficulty  - Respiratory Therapy support as indicated  Outcome: Progressing

## 2019-10-17 NOTE — UTILIZATION REVIEW
Continued Stay Review    Date: 10/17/2019                         Current Patient Class: Inpatient   Current Level of Care:  Critical care     HPI:85 y o  female initially admitted on 10/12/2019  - 81 yo f presented s/p fall at home with weakness , s/p recent SDH  Assessment/Plan: 10/16 pt started with increasing SOB, , Sats dropped to 88% placed oxygen  And eventually required Bipap,  given 1 dose of lasix, transferred to Level one stepdown       10/17 - weaned off bipap to 5L NC o2 humidified - neurochecks q 2      Pertinent Labs/Diagnostic Results:   Results from last 7 days   Lab Units 10/17/19  0443 10/16/19  0502 10/15/19  1131 10/15/19  0442 10/13/19  0755   WBC Thousand/uL 6 06 5 68  --  6 28 5 53   HEMOGLOBIN g/dL 8 7* 9 3*  --  8 4* 9 3*   HEMATOCRIT % 27 3* 29 9*  --  27 3* 28 7*   PLATELETS Thousands/uL 186 209 190 182 165   NEUTROS ABS Thousands/µL 3 91 3 47  --  4 03 3 76         Results from last 7 days   Lab Units 10/17/19  0443 10/16/19  0502 10/15/19  1131 10/15/19  0442 10/13/19  0507   SODIUM mmol/L 139 135* 135* 138 133*   POTASSIUM mmol/L 4 5 4 0 3 7 4 1 4 2   CHLORIDE mmol/L 103 100 100 101 101   CO2 mmol/L 30 28 27 30 24   ANION GAP mmol/L 6 7 8 7 8   BUN mg/dL 33* 33* 35* 36* 39*   CREATININE mg/dL 1 50* 1 41* 1 52* 1 65* 1 57*   EGFR ml/min/1 73sq m 32 34 31 28 30   CALCIUM mg/dL 8 6 8 9 8 9 8 9 9 2   MAGNESIUM mg/dL  --  2 4 2 5  --   --    PHOSPHORUS mg/dL  --  3 2 2 9  --   --              Results from last 7 days   Lab Units 10/17/19  0443 10/16/19  0502 10/15/19  1131 10/15/19  0442 10/13/19  0507 10/12/19  1220   GLUCOSE RANDOM mg/dL 89 84 150* 97 90 101             No results found for: BETA-HYDROXYBUTYRATE   Results from last 7 days   Lab Units 10/16/19  1448   PH ART  7 374   PCO2 ART mm Hg 47 3*   PO2 ART mm Hg 87 7   HCO3 ART mmol/L 27 0   BASE EXC ART mmol/L 1 3   O2 CONTENT ART mL/dL 14 5*   O2 HGB, ARTERIAL % 94 8   ABG SOURCE  Radial, Left     Results from last 7 days Lab Units 10/16/19  2209   PH KHALIF  7 331   PCO2 KHALIF mm Hg 59 5*   PO2 KHALIF mm Hg 22 9*   HCO3 KHALIF mmol/L 30 7*   BASE EXC KHALIF mmol/L 3 8   O2 CONTENT KHALIF ml/dL 5 0   O2 HGB, VENOUS % 34 6*         CXR 10/16 - Stable cardiomegaly with pulmonary vascular congestion        Vital Signs:   Date/Time  Temp  Pulse  Resp  BP  MAP (mmHg)  SpO2  O2 Device  Patient Position - Orthostatic VS   10/17/19 0900    60  21  74/47Abnormal   58  100 %       10/17/19 0802            100 %  Nasal cannula     10/17/19 0800  98 3 °F (36 8 °C)  56  20  90/70  83  99 %       10/17/19 0700    50Abnormal   17  106/65  81  99 %    Lying   10/17/19 0600    52Abnormal   17  111/60  82  99 %    Lying   10/17/19 0515    50Abnormal   17  127/57  73  99 %    Lying   10/17/19 0400  97 6 °F (36 4 °C)  52Abnormal   17  100/45Abnormal   65  100 %    Lying   10/17/19 0332            100 %       10/17/19 0300    56  20  111/59  74  100 %    Lying   10/17/19 0200    54Abnormal   29Abnormal   108/72  90  98 %    Lying   10/17/19 0155            100 %       10/17/19 0100    50Abnormal   18  107/55  76  100 %    Lying   10/17/19 0000  98 1 °F (36 7 °C)  50Abnormal   17  100/50  62  100 %    Lying   10/16/19 2343            100 %       10/16/19 2300    52Abnormal   19  98/44Abnormal   53  100 %     Lying   O2 Device: bipap at 10/16/19 2300   10/16/19 2200    56  26Abnormal   123/51  60  96 %     Lying   O2 Device: bipap at 10/16/19 2200   10/16/19 2100    54Abnormal   17  98/44Abnormal   57  100 %    Lying   10/16/19 2000  97 6 °F (36 4 °C)  58  20  125/49Abnormal   73  99 %    Lying   10/16/19 1946            100 %       10/16/19 1900    62  18  97/65  70  100 %     Lying   O2 Device: bipap at 10/16/19 1900   10/16/19 1800    64  20  132/68  91  100 %       10/16/19 1745            100 %       10/16/19 1743    60    128/70           10/16/19 1700    72  22  170/95  127  99 %     10/16/19 1640  97 8 °F (36 6 °C)  78  29Abnormal   148/73  101  100 %       10/16/19 1600      22             10/16/19 15:36:24  96 7 °F (35 9 °C)Abnormal   64  21  159/79  106  89 %Abnormal   Nasal cannula     10/16/19 1534            97 %       10/16/19 1122            99 %  None (Room air)             Medications:   amLODIPine 5 mg Oral QAM    docusate sodium 100 mg Oral BID    gabapentin 300 mg Oral TID    heparin (porcine) 5,000 Units Subcutaneous Q8H [facility]    levETIRAcetam 500 mg Oral BID    levothyroxine 112 mcg Oral Daily    metoprolol tartrate 25 mg Oral BID    nystatin  Topical BID    sertraline 25 mg Oral HS    torsemide 40 mg Oral Daily Started on 10/17 am    lasix 40 mg Oral Once  10/16 x 2                  acetaminophen 650 mg Oral Q6H PRN 10/16 x 2  10/17 x 1    albuterol 2 puff Inhalation Q4H PRN 10/16 x 1    albuterol 2 5 mg Nebulization Q4H PRN 10/16 x 2  10/17 x 2    LORazepam 0 5 mg Oral Q8H PRN 10/16 x 1    methocarbamol 500 mg Oral TID PRN 10/17 x 1    ondansetron 4 mg Intravenous Q6H PRN      Nursing Orders -  VS q 4 - daily weighs - up in chair - I & O q shift - SCD's to le's-  Diet 2 gm NA - neuro cks q 2       Discharge Plan:  D     Network Utilization Review Department  Phone: [phone]; Fax [phone]  [email or web address]  ATTENTION: Please call with any questions or concerns to [phone]  and carefully listen to the prompts so that you are directed to the right person  Send all requests for admission clinical reviews, approved or denied determinations and any other requests to fax [phone]   All voicemails are confidential

## 2019-10-17 NOTE — PROGRESS NOTES
Daily Progress Note - Critical Care Acceptance Note   Theodora Siemens 80 y o  female MRN: [id number]  Unit/Bed#: ICU 03 Encounter: [id number]        ----------------------------------------------------------------------------------------  HPI/24hr events: This is an 80year old female with history of COPD, grade 2 diastolic CHF with 44% EF, CVA, DM, HTN, HLD transferred to ICU for acute respiratory failure  Patient was initially admitted to trauma service for mechanical fall on Plavix, found to have a small SDH without any other injuries  Patient was admitted to trauma service from 10/12-16 for SDH with hospital course complicated by HERLINDA  She was being arranged for discharge to rehab today, however, prior to discharge she had a desat down to 88% with increased work of breathing and eventually required BiPAP     ---------------------------------------------------------------------------------------  SUBJECTIVE       Review of Systems   Constitutional: Negative  Negative for chills and fever  HENT: Negative  Negative for rhinorrhea  Eyes: Negative  Respiratory: Negative  Negative for cough and shortness of breath  Cardiovascular: Negative  Negative for chest pain and leg swelling  Gastrointestinal: Negative  Negative for abdominal pain, diarrhea, nausea and vomiting  Genitourinary: Negative  Negative for dysuria, flank pain and frequency  Musculoskeletal: Negative  Negative for back pain and neck pain  Skin: Negative  Negative for rash  Neurological: Negative  Negative for light-headedness and headaches  All other systems reviewed and are negative      Review of systems was reviewed and negative unless stated above in HPI/24-hour events   ---------------------------------------------------------------------------------------  Assessment and Plan:    Neuro:    Diagnosis: Small SDH, no observations, stable repeat head CT  o Keppra for seizure ppx   o GCS 15, Q2 neuro checks  Anxiety  o Scheduled home dose zoloft  o Ativan PRN   Analgesia:   o Scheduled gabapentin  o PRN Tylenol   o PRN Robaxin      CV:    Hx diastolic heart failure   - Grade 2 diastolic EF with EF 50%  - VBG last night: pH 7 33, pCO2 60, pO2 23, bicarb 30, SVO2 35  - Scheduled torsemide   - Lasix as needed for additional diuresis   · HTN  - Home dose Norvasc, lopressor    Pulm:   Acute hypoxic respiratory failure, hx COPD  o On BiPAP 15/5  o Duonebs PRN   o CXR from yesterday stable cardiomegaly with mild pulmonary vascular congestion  Respiratory    Lab Data (Last 4 hours)    None         O2/Vent Data (Last 4 hours)      10/17 0332          Non-Invasive Ventilation Mode BiPAP                   GI:    Abd soft, non-tender  o Zofran PRN  o Bowel regimen: Colace  o NPO while on BiPAP    :    HERLINDA  o CRT improving - was 2 6 on 10/8, down to 1 5 today    Monitor volume status and I&O, has a [place]   I/O       10/15 0701 - 10/16 0700 10/16 0701 - 10/17 0700    P  O  880 420    Total Intake(mL/kg) 880 (9 9) 420 (4 9)    Urine (mL/kg/hr) 1054 (0 5) 1100 (0 5)    Stool 0 0    Total Output 1054 1100    Net -174 -680          Unmeasured Urine Occurrence  1 x    Unmeasured Stool Occurrence 2 x 1 x        F/E/N:    Replete electrolytes PRN   Holding fluids while diuresing     Heme/Onc:    Chronic anemia, stable    Hgb 8 7 (from 9 3),    Continue to monitor    On heparin for VTE ppx    Endo:    Hypothyroidism  o Home dose levothyroxine     ID:    No issues, monitor for fever and leukocytosis    No obvious infiltrates on CXR but continue to monitor       MSK/Skin:    Frequent turning, offloading, skin checks    Disposition: Continue Critical Care   Code Status: Level 1 - Full Code  ---------------------------------------------------------------------------------------  ICU CORE MEASURES    Prophylaxis   VTE Pharmacologic Prophylaxis: Heparin  VTE Mechanical Prophylaxis: sequential compression device      Invasive Devices Review  Invasive Devices     Central Venous Catheter Line            CVC Central Lines 10/16/19 Triple Right Femoral less than 1 day          Drain            External Urinary Catheter 3 days              Can any invasive devices be discontinued today? Not applicable  ---------------------------------------------------------------------------------------  OBJECTIVE    Physical Exam   Constitutional: She is oriented to person, place, and time  She appears well-developed and well-nourished  No distress  HENT:   Head: Normocephalic and atraumatic  Mouth/Throat: Oropharynx is clear and moist    Eyes: EOM are normal    Neck: Normal range of motion  Neck supple  Cardiovascular: Normal rate, regular rhythm, normal heart sounds and intact distal pulses  Exam reveals no gallop and no friction rub  No murmur heard  Pulmonary/Chest: Effort normal and breath sounds normal  No respiratory distress  She has no wheezes  She has no rales  On BiPAP   Abdominal: Soft  There is no tenderness  There is no rebound and no guarding  Musculoskeletal: She exhibits no edema or tenderness  Neurological: She is alert and oriented to person, place, and time  No cranial nerve deficit  Clear fluent speech   Skin: Skin is warm and dry  Capillary refill takes less than 2 seconds  Psychiatric: She has a normal mood and affect  Nursing note and vitals reviewed        Vitals   Vitals:    10/17/19 0300 10/17/19 0332 10/17/19 0400 10/17/19 0600   BP: 111/59  (!) 100/45    BP Location: Right arm  Right arm    Pulse: 56  (!) 52    Resp: 20  17    Temp:   97 6 °F (36 4 °C)    TempSrc:   Axillary    SpO2: 100% 100% 100%    Weight:    86 2 kg (190 lb 0 6 oz)   Height:         Temp (24hrs), Av 4 °F (36 3 °C), Min:96 6 °F (35 9 °C), Max:98 1 °F (36 7 °C)  Current: Temperature: 97 6 °F (36 4 °C)      Invasive/non-invasive ventilation settings   Respiratory    Lab Data (Last 4 hours)    None         O2/Vent Data (Last 4 hours)      10/17 0332          Non-Invasive Ventilation Mode BiPAP                   Height and Weights   Height: 4' 11" (149 9 cm)  IBW: 43 2 kg  Body mass index is 38 38 kg/m²  Weight (last 2 days)     Date/Time   Weight    10/17/19 0600   86 2 (190 04)    10/15/19 0600   88 5 (195)                Intake and Output  I/O       10/14 0701 - 10/15 0700 10/15 0701 - 10/16 0700 10/16 0701 - 10/17 0700    P  O  880 880 300    Total Intake(mL/kg) 880 (9 9) 880 (9 9) 300 (3 4)    Urine (mL/kg/hr) 600 (0 3) 1054 (0 5) 800 (1)    Stool 0 0 0    Total Output 600 1054 800    Net +280 -174 -500           Unmeasured Urine Occurrence 4 x      Unmeasured Stool Occurrence 3 x 2 x 1 x            Nutrition       Diet Orders   (From admission, onward)             Start     Ordered    10/15/19 1356  Diet Cardiovascular; Sodium 2 GM  Diet effective now     Question Answer Comment   Diet Type Cardiovascular    Cardiac Sodium 2 GM    RD to adjust diet per protocol?  Yes        10/15/19 1356              Laboratory and Diagnostics:  Results from last 7 days   Lab Units 10/17/19  0443 10/16/19  0502 10/15/19  1131 10/15/19  0442 10/13/19  0755   WBC Thousand/uL 6 06 5 68  --  6 28 5 53   HEMOGLOBIN g/dL 8 7* 9 3*  --  8 4* 9 3*   HEMATOCRIT % 27 3* 29 9*  --  27 3* 28 7*   PLATELETS Thousands/uL 186 209 190 182 165   NEUTROS PCT % 64 61  --  65 67   MONOS PCT % 7 8  --  7 7     Results from last 7 days   Lab Units 10/17/19  0443 10/16/19  0502 10/15/19  1131 10/15/19  0442 10/13/19  0507 10/12/19  1220   SODIUM mmol/L 139 135* 135* 138 133* 130*   POTASSIUM mmol/L 4 5 4 0 3 7 4 1 4 2 4 3   CHLORIDE mmol/L 103 100 100 101 101 93*   CO2 mmol/L 30 28 27 30 24 29   ANION GAP mmol/L 6 7 8 7 8 8   BUN mg/dL 33* 33* 35* 36* 39* 43*   CREATININE mg/dL 1 50* 1 41* 1 52* 1 65* 1 57* 1 87*   CALCIUM mg/dL 8 6 8 9 8 9 8 9 9 2 10 3*   GLUCOSE RANDOM mg/dL 89 84 150* 97 90 101     Results from last 7 days   Lab Units 10/16/19  050 10/15/19  1131   MAGNESIUM mg/dL 2 4 2 5   PHOSPHORUS mg/dL 3 2 2 9                   ABG:  Results from last 7 days   Lab Units 10/16/19  1448   PH ART  7 374   PCO2 ART mm Hg 47 3*   PO2 ART mm Hg 87 7   HCO3 ART mmol/L 27 0   BASE EXC ART mmol/L 1 3   ABG SOURCE  Radial, Left     VBG:  Results from last 7 days   Lab Units 10/16/19  2209 10/16/19  1448   PH KHALIF  7 331  --    PCO2 KHALIF mm Hg 59 5*  --    PO2 KHALIF mm Hg 22 9*  --    HCO3 KHALIF mmol/L 30 7*  --    BASE EXC KHALIF mmol/L 3 8  --    ABG SOURCE   --  Radial, Left         Imaging: I have personally reviewed pertinent reports  Active Medications  Scheduled Meds:    Current Facility-Administered Medications:  acetaminophen 650 mg Oral Q6H PRN Gisela Alvarez MD   albuterol 2 puff Inhalation Q4H PRN Gisela Alvarez MD   albuterol 2 5 mg Nebulization Q4H PRN Gisela Alvarez MD   amLODIPine 5 mg Oral QAM Gisela Alvarez MD   docusate sodium 100 mg Oral BID Gisela Alvarez MD   gabapentin 300 mg Oral TID Gisela Alvarez MD   heparin (porcine) 5,000 Units Subcutaneous Q8H [address] Gisela Alvarez MD   levETIRAcetam 500 mg Oral BID Gisela Alvarez MD   levothyroxine 112 mcg Oral Daily Gisela Alvarez MD   LORazepam 0 5 mg Oral Q8H PRN Gisela Alvarez MD   methocarbamol 500 mg Oral TID PRN Gisela Alvarez MD   metoprolol tartrate 25 mg Oral BID Gisela Alvarez MD   nystatin  Topical BID Gisela Alvarez MD   ondansetron 4 mg Intravenous Q6H PRN Gisela Alvarez MD   sertraline 25 mg Oral HS Gisela Alvarez MD   torsemide 40 mg Oral Daily Gisela Alvarez MD     Continuous Infusions:     PRN Meds:     acetaminophen 650 mg Q6H PRN   albuterol 2 puff Q4H PRN   albuterol 2 5 mg Q4H PRN   LORazepam 0 5 mg Q8H PRN   methocarbamol 500 mg TID PRN   ondansetron 4 mg Q6H PRN       Allergies   Allergies   Allergen Reactions    Duloxetine Hcl Other (See Comments) and Hypertension    Duloxetine Hcl      Cymbalta;  Hemorrhagic stroke listed as reaction    Escitalopram      Other reaction(s): Urinary Retention    Pregabalin      Other reaction(s): Hypertension    Statins Myalgia    Tramadol      Other reaction(s): Hypertension    Triprolidine-Pse Other (See Comments)    Anastrozole Abdominal Pain    Anastrozole     Antihistamines, Diphenhydramine-Type     Exemestane      Aromasin; Muscle pain & cramps    Lexapro [Escitalopram]      Urinary retention    Lyrica [Pregabalin]      hypertension    Metformin      diarrhea    Oxycodone      Pt unsure      Statins      Muscle pain & cramps    Tramadol      hypertension    Triprolidine-Pseudoephedrine     Metformin Diarrhea       Advance Directive and Living Will: Yes    Power of : Yes  POLST:          Claudia Wharton MD        Portions of the record may have been created with voice recognition software  Occasional wrong word or "sound a like" substitutions may have occurred due to the inherent limitations of voice recognition software    Read the chart carefully and recognize, using context, where substitutions have occurred

## 2019-10-17 NOTE — PROCEDURES
Central Line Insertion  Date/Time: 10/16/2019 10:18 PM  Performed by: Myra Avila MD  Authorized by: Myra Avila MD     Patient location:  ICU  Other Assisting Provider: Yes (comment) (Dr Glenn Antunez)    Consent:     Consent obtained:  Written    Consent given by:  Guardian    Risks discussed:  Incorrect placement, bleeding, arterial puncture and infection    Alternatives discussed:  Delayed treatment  Universal protocol:     Patient identity confirmed:  Verbally with patient and arm band  Pre-procedure details:     Hand hygiene: Hand hygiene performed prior to insertion      Sterile barrier technique: All elements of maximal sterile technique followed      Skin preparation:  ChloraPrep    Skin preparation agent: Skin preparation agent completely dried prior to procedure    Indications:     Central line indications: no peripheral vascular access    Anesthesia (see MAR for exact dosages): Anesthesia method:  Local infiltration    Local anesthetic:  Lidocaine 1% w/o epi  Procedure details:     Location:  Right femoral    Vessel type: vein      Laterality:  Right    Approach: percutaneous technique used      Patient position:  Reverse Trendelenburg    Catheter type:  Triple lumen 16cm    Catheter size:  7 Fr    Landmarks identified: yes      Ultrasound guidance: yes      Sterile ultrasound techniques: Sterile gel and sterile probe covers were used      Number of attempts:  1    Successful placement: yes    Post-procedure details:     Post-procedure:  Dressing applied and line sutured    Assessment:  Blood return through all ports and free fluid flow    Post-procedure complications: none      Patient tolerance of procedure:   Tolerated well, no immediate complications

## 2019-10-17 NOTE — PROGRESS NOTES
Patient c/o breathing "feeling jerky", p o  100%, lungs decreased with faint crackles, resident called to the bedside

## 2019-10-18 LAB
ANION GAP SERPL CALCULATED.3IONS-SCNC: 7 MMOL/L (ref 4–13)
BASOPHILS # BLD AUTO: 0.04 THOUSANDS/ΜL (ref 0–0.1)
BASOPHILS NFR BLD AUTO: 1 % (ref 0–1)
BUN SERPL-MCNC: 37 MG/DL (ref 5–25)
CALCIUM SERPL-MCNC: 8.6 MG/DL (ref 8.3–10.1)
CHLORIDE SERPL-SCNC: 103 MMOL/L (ref 100–108)
CO2 SERPL-SCNC: 28 MMOL/L (ref 21–32)
CREAT SERPL-MCNC: 1.53 MG/DL (ref 0.6–1.3)
EOSINOPHIL # BLD AUTO: 0.16 THOUSAND/ΜL (ref 0–0.61)
EOSINOPHIL NFR BLD AUTO: 3 % (ref 0–6)
ERYTHROCYTE [DISTWIDTH] IN BLOOD BY AUTOMATED COUNT: 14.8 % (ref 11.6–15.1)
GFR SERPL CREATININE-BSD FRML MDRD: 31 ML/MIN/1.73SQ M
GLUCOSE SERPL-MCNC: 119 MG/DL (ref 65–140)
GLUCOSE SERPL-MCNC: 125 MG/DL (ref 65–140)
GLUCOSE SERPL-MCNC: 96 MG/DL (ref 65–140)
HCT VFR BLD AUTO: 26.9 % (ref 34.8–46.1)
HGB BLD-MCNC: 8.3 G/DL (ref 11.5–15.4)
IMM GRANULOCYTES # BLD AUTO: 0.02 THOUSAND/UL (ref 0–0.2)
IMM GRANULOCYTES NFR BLD AUTO: 0 % (ref 0–2)
LYMPHOCYTES # BLD AUTO: 1.65 THOUSANDS/ΜL (ref 0.6–4.47)
LYMPHOCYTES NFR BLD AUTO: 26 % (ref 14–44)
MCH RBC QN AUTO: 32.2 PG (ref 26.8–34.3)
MCHC RBC AUTO-ENTMCNC: 30.9 G/DL (ref 31.4–37.4)
MCV RBC AUTO: 104 FL (ref 82–98)
MONOCYTES # BLD AUTO: 0.51 THOUSAND/ΜL (ref 0.17–1.22)
MONOCYTES NFR BLD AUTO: 8 % (ref 4–12)
NEUTROPHILS # BLD AUTO: 3.87 THOUSANDS/ΜL (ref 1.85–7.62)
NEUTS SEG NFR BLD AUTO: 62 % (ref 43–75)
NRBC BLD AUTO-RTO: 0 /100 WBCS
PLATELET # BLD AUTO: 193 THOUSANDS/UL (ref 149–390)
PMV BLD AUTO: 9.4 FL (ref 8.9–12.7)
POTASSIUM SERPL-SCNC: 4.1 MMOL/L (ref 3.5–5.3)
RBC # BLD AUTO: 2.58 MILLION/UL (ref 3.81–5.12)
SODIUM SERPL-SCNC: 138 MMOL/L (ref 136–145)
WBC # BLD AUTO: 6.25 THOUSAND/UL (ref 4.31–10.16)

## 2019-10-18 PROCEDURE — 82948 REAGENT STRIP/BLOOD GLUCOSE: CPT

## 2019-10-18 PROCEDURE — 94760 N-INVAS EAR/PLS OXIMETRY 1: CPT

## 2019-10-18 PROCEDURE — 97116 GAIT TRAINING THERAPY: CPT

## 2019-10-18 PROCEDURE — 97535 SELF CARE MNGMENT TRAINING: CPT

## 2019-10-18 PROCEDURE — 94640 AIRWAY INHALATION TREATMENT: CPT

## 2019-10-18 PROCEDURE — 99232 SBSQ HOSP IP/OBS MODERATE 35: CPT | Performed by: SURGERY

## 2019-10-18 PROCEDURE — 94660 CPAP INITIATION&MGMT: CPT

## 2019-10-18 PROCEDURE — 80048 BASIC METABOLIC PNL TOTAL CA: CPT | Performed by: EMERGENCY MEDICINE

## 2019-10-18 PROCEDURE — 85025 COMPLETE CBC W/AUTO DIFF WBC: CPT | Performed by: EMERGENCY MEDICINE

## 2019-10-18 RX ADMIN — IPRATROPIUM BROMIDE 0.5 MG: 0.5 SOLUTION RESPIRATORY (INHALATION) at 07:12

## 2019-10-18 RX ADMIN — SERTRALINE HYDROCHLORIDE 25 MG: 25 TABLET ORAL at 21:30

## 2019-10-18 RX ADMIN — METOPROLOL TARTRATE 25 MG: 25 TABLET, FILM COATED ORAL at 09:17

## 2019-10-18 RX ADMIN — LEVETIRACETAM 500 MG: 500 TABLET, FILM COATED ORAL at 09:17

## 2019-10-18 RX ADMIN — IPRATROPIUM BROMIDE 0.5 MG: 0.5 SOLUTION RESPIRATORY (INHALATION) at 19:24

## 2019-10-18 RX ADMIN — TORSEMIDE 40 MG: 20 TABLET ORAL at 09:17

## 2019-10-18 RX ADMIN — LEVALBUTEROL HYDROCHLORIDE 1.25 MG: 1.25 SOLUTION, CONCENTRATE RESPIRATORY (INHALATION) at 13:02

## 2019-10-18 RX ADMIN — LEVETIRACETAM 500 MG: 500 TABLET, FILM COATED ORAL at 17:17

## 2019-10-18 RX ADMIN — NYSTATIN: 100000 POWDER TOPICAL at 09:18

## 2019-10-18 RX ADMIN — GABAPENTIN 300 MG: 300 CAPSULE ORAL at 17:16

## 2019-10-18 RX ADMIN — METOPROLOL TARTRATE 25 MG: 25 TABLET, FILM COATED ORAL at 17:16

## 2019-10-18 RX ADMIN — HEPARIN SODIUM 5000 UNITS: 5000 INJECTION INTRAVENOUS; SUBCUTANEOUS at 21:30

## 2019-10-18 RX ADMIN — AMLODIPINE BESYLATE 5 MG: 5 TABLET ORAL at 09:17

## 2019-10-18 RX ADMIN — ACETAMINOPHEN 650 MG: 325 TABLET ORAL at 00:29

## 2019-10-18 RX ADMIN — LEVALBUTEROL HYDROCHLORIDE 1.25 MG: 1.25 SOLUTION, CONCENTRATE RESPIRATORY (INHALATION) at 19:24

## 2019-10-18 RX ADMIN — NYSTATIN: 100000 POWDER TOPICAL at 17:18

## 2019-10-18 RX ADMIN — IPRATROPIUM BROMIDE 0.5 MG: 0.5 SOLUTION RESPIRATORY (INHALATION) at 13:02

## 2019-10-18 RX ADMIN — LEVALBUTEROL HYDROCHLORIDE 1.25 MG: 1.25 SOLUTION, CONCENTRATE RESPIRATORY (INHALATION) at 07:12

## 2019-10-18 RX ADMIN — GABAPENTIN 300 MG: 300 CAPSULE ORAL at 21:30

## 2019-10-18 RX ADMIN — HEPARIN SODIUM 5000 UNITS: 5000 INJECTION INTRAVENOUS; SUBCUTANEOUS at 06:03

## 2019-10-18 RX ADMIN — GABAPENTIN 300 MG: 300 CAPSULE ORAL at 09:17

## 2019-10-18 RX ADMIN — METHOCARBAMOL TABLETS 500 MG: 500 TABLET, COATED ORAL at 17:16

## 2019-10-18 RX ADMIN — LEVOTHYROXINE SODIUM 112 MCG: 112 TABLET ORAL at 09:17

## 2019-10-18 RX ADMIN — HEPARIN SODIUM 5000 UNITS: 5000 INJECTION INTRAVENOUS; SUBCUTANEOUS at 13:31

## 2019-10-18 NOTE — PLAN OF CARE
Problem: Potential for Falls  Goal: Patient will remain free of falls  Description  INTERVENTIONS:  - Assess patient frequently for physical needs  -  Identify cognitive and physical deficits and behaviors that affect risk of falls    -  [place] fall precautions as indicated by assessment   - Educate patient/family on patient safety including physical limitations  - Instruct patient to call for assistance with activity based on assessment  - Modify environment to reduce risk of injury  - Consider OT/PT consult to assist with strengthening/mobility  Outcome: Progressing     Problem: PAIN - ADULT  Goal: Verbalizes/displays adequate comfort level or baseline comfort level  Description  Interventions:  - Encourage patient to monitor pain and request assistance  - Assess pain using appropriate pain scale  - Administer analgesics based on type and severity of pain and evaluate response  - Implement non-pharmacological measures as appropriate and evaluate response  - Consider cultural and social influences on pain and pain management  - Notify physician/advanced practitioner if interventions unsuccessful or patient reports new pain  Outcome: Progressing     Problem: INFECTION - ADULT  Goal: Absence or prevention of progression during hospitalization  Description  INTERVENTIONS:  - Assess and monitor for signs and symptoms of infection  - Monitor lab/diagnostic results  - Monitor all insertion sites, i e  indwelling lines, tubes, and drains  - Monitor endotracheal if appropriate and nasal secretions for changes in amount and color  - [place] appropriate cooling/warming therapies per order  - Administer medications as ordered  - Instruct and encourage patient and family to use good hand hygiene technique  - Identify and instruct in appropriate isolation precautions for identified infection/condition  Outcome: Progressing  Goal: Absence of fever/infection during neutropenic period  Description  INTERVENTIONS:  - Monitor WBC    Outcome: Progressing     Problem: SAFETY ADULT  Goal: Maintain or return to baseline ADL function  Description  INTERVENTIONS:  -  Assess patient's ability to carry out ADLs; assess patient's baseline for ADL function and identify physical deficits which impact ability to perform ADLs (bathing, care of mouth/teeth, toileting, grooming, dressing, etc )  - Assess/evaluate cause of self-care deficits   - Assess range of motion  - Assess patient's mobility; develop plan if impaired  - Assess patient's need for assistive devices and provide as appropriate  - Encourage maximum independence but intervene and supervise when necessary  - Involve family in performance of ADLs  - Assess for home care needs following discharge   - Consider OT consult to assist with ADL evaluation and planning for discharge  - Provide patient education as appropriate  Outcome: Progressing  Goal: Maintain or return mobility status to optimal level  Description  INTERVENTIONS:  - Assess patient's baseline mobility status (ambulation, transfers, stairs, etc )    - Identify cognitive and physical deficits and behaviors that affect mobility  - Identify mobility aids required to assist with transfers and/or ambulation (gait belt, sit-to-stand, lift, walker, cane, etc )  - [place] fall precautions as indicated by assessment  - Record patient progress and toleration of activity level on Mobility SBAR; progress patient to next Phase/Stage  - Instruct patient to call for assistance with activity based on assessment  - Consider rehabilitation consult to assist with strengthening/weightbearing, etc   Outcome: Progressing     Problem: DISCHARGE PLANNING  Goal: Discharge to home or other facility with appropriate resources  Description  INTERVENTIONS:  - Identify barriers to discharge w/patient and caregiver  - Arrange for needed discharge resources and transportation as appropriate  - Identify discharge learning needs (meds, wound care, etc )  - Arrange for interpretive services to assist at discharge as needed  - Refer to Case Management Department for coordinating discharge planning if the patient needs post-hospital services based on physician/advanced practitioner order or complex needs related to functional status, cognitive ability, or social support system  Outcome: Progressing     Problem: SKIN/TISSUE INTEGRITY - ADULT  Goal: Skin integrity remains intact  Description  INTERVENTIONS  - Identify patients at risk for skin breakdown  - Assess and monitor skin integrity  - Assess and monitor nutrition and hydration status  - Monitor labs (i e  albumin)  - Assess for incontinence   - Turn and reposition patient  - Assist with mobility/ambulation  - Relieve pressure over bony prominences  - Avoid friction and shearing  - Provide appropriate hygiene as needed including keeping skin clean and dry  - Evaluate need for skin moisturizer/barrier cream  - Collaborate with interdisciplinary team (i e  Nutrition, Rehabilitation, etc )   - Patient/family teaching  Outcome: Progressing  Goal: Incision(s), wounds(s) or drain site(s) healing without S/S of infection  Description  INTERVENTIONS  - Assess and document risk factors for skin impairment   - Assess and document dressing, incision, wound bed, drain sites and surrounding tissue  - Consider nutrition services referral as needed  - Oral mucous membranes remain intact  - Provide patient/ family education  Outcome: Progressing  Goal: Oral mucous membranes remain intact  Description  INTERVENTIONS  - Assess oral mucosa and hygiene practices  - Implement preventative oral hygiene regimen  - Implement oral medicated treatments as ordered  - Initiate Nutrition services referral as needed  Outcome: Progressing     Problem: HEMATOLOGIC - ADULT  Goal: Maintains hematologic stability  Description  INTERVENTIONS  - Assess for signs and symptoms of bleeding or hemorrhage  - Monitor labs  - Administer supportive blood products/factors as ordered and appropriate  Outcome: Progressing     Problem: MUSCULOSKELETAL - ADULT  Goal: Maintain or return mobility to safest level of function  Description  INTERVENTIONS:  - Assess patient's ability to carry out ADLs; assess patient's baseline for ADL function and identify physical deficits which impact ability to perform ADLs (bathing, care of mouth/teeth, toileting, grooming, dressing, etc )  - Assess/evaluate cause of self-care deficits   - Assess range of motion  - Assess patient's mobility  - Assess patient's need for assistive devices and provide as appropriate  - Encourage maximum independence but intervene and supervise when necessary  - Involve family in performance of ADLs  - Assess for home care needs following discharge   - Consider OT consult to assist with ADL evaluation and planning for discharge  - Provide patient education as appropriate  Outcome: Progressing  Goal: Maintain proper alignment of affected body part  Description  INTERVENTIONS:  - Support, maintain and protect limb and body alignment  - Provide patient/ family with appropriate education  Outcome: Progressing     Problem: Prexisting or High Potential for Compromised Skin Integrity  Goal: Skin integrity is maintained or improved  Description  INTERVENTIONS:  - Identify patients at risk for skin breakdown  - Assess and monitor skin integrity  - Assess and monitor nutrition and hydration status  - Monitor labs   - Assess for incontinence   - Turn and reposition patient  - Assist with mobility/ambulation  - Relieve pressure over bony prominences  - Avoid friction and shearing  - Provide appropriate hygiene as needed including keeping skin clean and dry  - Evaluate need for skin moisturizer/barrier cream  - Collaborate with interdisciplinary team   - Patient/family teaching  - Consider wound care consult   Outcome: Progressing     Problem: RESPIRATORY - ADULT  Goal: Achieves optimal ventilation and oxygenation  Description  INTERVENTIONS:  - Assess for changes in respiratory status  - Assess for changes in mentation and behavior  - Position to facilitate oxygenation and minimize respiratory effort  - Oxygen administered by appropriate delivery if ordered  - Initiate smoking cessation education as indicated  - Encourage broncho-pulmonary hygiene including cough, deep breathe, Incentive Spirometry  - Assess the need for suctioning and aspirate as needed  - Assess and instruct to report SOB or any respiratory difficulty  - Respiratory Therapy support as indicated  Outcome: Progressing

## 2019-10-18 NOTE — PLAN OF CARE
Problem: PHYSICAL THERAPY ADULT  Goal: Performs mobility at highest level of function for planned discharge setting  See evaluation for individualized goals  Description  Treatment/Interventions: Functional transfer training, LE strengthening/ROM, Therapeutic exercise, Gait training, Bed mobility, Endurance training  Equipment Recommended: Scottie Arriaga       See flowsheet documentation for full assessment, interventions and recommendations  Note:   Prognosis: Fair  Problem List: Decreased range of motion, Decreased strength, Decreased endurance, Impaired balance, Decreased mobility, Decreased safety awareness, Pain  Assessment: Pt very motivated to complete therapy  REquired increased time to complete transfers with deficits in strength and balance  Ambulated with severely slow gait  Decreased foot clearance  Required frequent instruction during ambulation for RW distance and positioning  Pt will benefit from continued inpt skilled PT to maximize functional mobility & safety  Barriers to Discharge: Inaccessible home environment     Recommendation: Post acute IP rehab     PT - OK to Discharge: Yes    See flowsheet documentation for full assessment

## 2019-10-18 NOTE — PLAN OF CARE
Problem: OCCUPATIONAL THERAPY ADULT  Goal: Performs self-care activities at highest level of function for planned discharge setting  See evaluation for individualized goals  Description  Treatment Interventions: ADL retraining, Functional transfer training, Endurance training, Patient/family training, Cognitive reorientation, Equipment evaluation/education, Compensatory technique education, Activityengagement          See flowsheet documentation for full assessment, interventions and recommendations  Outcome: Progressing  Note:   Limitation: Decreased ADL status, Decreased Safe judgement during ADL, Decreased endurance, Decreased self-care trans  Prognosis: Good, Fair  Assessment: pt participated in am ot session and was seen focusing on adls and toileting / functional transfers, bed  mobility  pt was recently transfered to from icu with respritory issues she is not back on pphp6 , discussion with otr/l and plan to resume goals from ie   pt continues to require in pt rehab  she ermains on 3 l o2 with + sob  pulse oxy and pulse remain wfl  pt does requrie mod asst for sit to stand and trnasfers with rw as well as incrased time allowed  pt requires ta for incont management and for loreto anal care and clothing management  pt with poor plus standing balance and requires overall mod asst for lb adl's  pt reports she wearrs deppends at home and uses lhae for dressing          OT Discharge Recommendation: Short Term Rehab     [facility] [place], [place]

## 2019-10-18 NOTE — PROGRESS NOTES
Progress Note - Ivy Harris 1934, 80 y o  female MRN: [id number]    Unit/Bed#: [facility] 609-01 Encounter: [id number]    Primary Care Provider: Eileen Ramey DO   Date and time admitted to hospital: 10/12/2019 11:07 AM        Acute kidney injury [facility])  Assessment & Plan  - baseline appears to be 1 0-1 2  - will continue to trend kidney function  - a m  Labs pending  - patient did required another dose of Lasix to 40 mg today      * Ambulatory dysfunction  Assessment & Plan  - PT/OT  - Gerontology consult  - rehab    History of COPD  Assessment & Plan  - repeat chest x-ray today  - ABG pending  - continue to monitor  - nasal cannula as needed  - does not wear oxygen at home  - respiratory consulted    Traumatic subdural hematoma without loss of consciousness ([facility] [address] )  Assessment & Plan  - recently discharged 10/10/19  - no new hemorrhage on head CT 10/12/19  - continue to monitor overall clinical status  - GCS 15, stable neurovascular exam    Chronic diastolic heart failure (HCC)  Assessment & Plan  Wt Readings from Last 3 Encounters:   10/17/19 86 2 kg (190 lb 0 6 oz)   10/10/19 84 3 kg (185 lb 14 4 oz)   10/08/19 84 1 kg (185 lb 6 5 oz)       - Lasix 40 mg given today  - O2 sats acceptable but patient with c/o SOB  - will give MDI  - Repeat CXR today on  10/16/19  - ABG pending  - respiratory consulted for evaluation of possible nebulizer treatments  - will continue to follow clinically          Bedside rounds completed with nurse Yes       Disposition: Patient still requiring nasal cannula during the day and CPAP at night      SUBJECTIVE:  Chief Complaint: hypoxic respiratory distress     Subjective: slept well breathing ok with nasal cannula      OBJECTIVE:     Meds/Allergies     Current Facility-Administered Medications:     acetaminophen (TYLENOL) tablet 650 mg, 650 mg, Oral, Q6H PRN, Gisela Alvarez MD, 650 mg at 10/18/19 0029    albuterol (PROVENTIL HFA,VENTOLIN HFA) inhaler 2 puff, 2 puff, Inhalation, Q4H PRN, Lisa Alvarez MD, 2 puff at 10/16/19 1415    albuterol inhalation solution 2 5 mg, 2 5 mg, Nebulization, Q4H PRN, Gisela Alvarez MD, 2 5 mg at 10/17/19 0910    amLODIPine (NORVASC) tablet 5 mg, 5 mg, Oral, QAM, Gisela Alvarez MD, 5 mg at 10/16/19 0821    docusate sodium (COLACE) capsule 100 mg, 100 mg, Oral, BID, Gisela Alvarez MD, 100 mg at 10/16/19 0821    gabapentin (NEURONTIN) capsule 300 mg, 300 mg, Oral, TID, Gisela Alvarez MD, 300 mg at 10/17/19 2106    heparin (porcine) subcutaneous injection 5,000 Units, 5,000 Units, Subcutaneous, Q8H [address], 5,000 Units at 10/18/19 0603 **AND** [COMPLETED] Platelet count, , , Once, Jayjay Hooker MD    ipratropium (ATROVENT) 0 02 % inhalation solution 0 5 mg, 0 5 mg, Nebulization, TID, Gisela Alvarez MD, 0 5 mg at 10/18/19 0712    levalbuterol ([place]) inhalation solution 1 25 mg, 1 25 mg, Nebulization, TID, Gisela Alvarez MD, 1 25 mg at 10/18/19 0712    levETIRAcetam (KEPPRA) tablet 500 mg, 500 mg, Oral, BID, Gisela Alvarez MD, 500 mg at 10/17/19 1758    levothyroxine tablet 112 mcg, 112 mcg, Oral, Daily, Gisela Alvarez MD, 112 mcg at 10/17/19 0906    LORazepam (ATIVAN) tablet 0 5 mg, 0 5 mg, Oral, Q8H PRN, Gisela Alvarez MD, 0 5 mg at 10/16/19 1530    methocarbamol (ROBAXIN) tablet 500 mg, 500 mg, Oral, TID PRN, Gisela Alvarez MD, 500 mg at 10/17/19 1016    metoprolol tartrate (LOPRESSOR) tablet 25 mg, 25 mg, Oral, BID, Gisela Alvarez MD, 25 mg at 10/17/19 1758    nystatin (MYCOSTATIN) powder, , Topical, BID, Gisela Alvarez MD    ondansetron (ZOFRAN) injection 4 mg, 4 mg, Intravenous, Q6H PRN, Gisela Alvarez MD    sertraline (ZOLOFT) tablet 25 mg, 25 mg, Oral, HS, Gisela Alvarez MD, 25 mg at 10/17/19 2106    torsemide (DEMADEX) tablet 40 mg, 40 mg, Oral, Daily, Gisela Alvarez MD     Vitals:   Vitals:    10/18/19 0744   BP: 113/55   Pulse: 77   Resp: 16   Temp:    SpO2: 98%       Intake/Output:  I/O       10/16 0701 - 10/17 0700 10/17 0701 - 10/18 0700 10/18 0701 - 10/19 0700    P  O  420 600     Total Intake(mL/kg) 420 (4 9) 600 (7)     Urine (mL/kg/hr) 1100 (0 5) 1000 (0 5)     Stool 0 0     Total Output 1100 1000     Net -680 -400            Unmeasured Urine Occurrence 1 x 1 x     Unmeasured Stool Occurrence 1 x 1 x            Nutrition/GI Proph/Bowel Reg: Sodium restriction    Physical Exam:   GENERAL APPEARANCE: NAD, resting comfortably in bed  NEURO: good sensation throughout  HEENT: NC/AT, no obvious signs of trauma  CV: RRR, No M/R/G  LUNGS: Mild crackling noted B/L, Chest rise equal B/L, nasal cannula in place  ABD: NT/ND, BSx4  MSK: No signs of edema/ erythema noted  SKIN: No obvious signs of skin breakdown noted, warm/dry      Invasive Devices     Peripheral Intravenous Line            Peripheral IV 10/17/19 Right Antecubital less than 1 day          Drain            External Urinary Catheter 4 days                 Lab Results:   Results Reviewed     Procedure Component Value Units Date/Time    Platelet count [[id number]]  (Normal) Collected:  10/15/19 1131    Lab Status:  Final result Specimen:  Blood from Hand, Left Updated:  10/15/19 1145     Platelets 916 Thousands/uL      MPV 9 4 fL     Basic metabolic panel [[id number]]  (Abnormal) Collected:  10/13/19 0507    Lab Status:  Final result Specimen:  Blood from Arm, Right Updated:  10/13/19 7392     Sodium 133 mmol/L      Potassium 4 2 mmol/L      Chloride 101 mmol/L      CO2 24 mmol/L      ANION GAP 8 mmol/L      BUN 39 mg/dL      Creatinine 1 57 mg/dL      Glucose 90 mg/dL      Calcium 9 2 mg/dL      eGFR 30 ml/min/1 73sq m     Narrative:       [place] guidelines for Chronic Kidney Disease (CKD):     Stage 1 with normal or high GFR (GFR > 90 mL/min/1 73 square meters)    Stage 2 Mild CKD (GFR = 60-89 mL/min/1 73 square meters)    Stage 3A Moderate CKD (GFR = 45-59 mL/min/1 73 square meters)    Stage 3B Moderate CKD (GFR = 30-44 mL/min/1 73 square meters)    Stage 4 Severe CKD (GFR = 15-29 mL/min/1 73 square meters)    Stage 5 End Stage CKD (GFR <15 mL/min/1 73 square meters)  Note: GFR calculation is accurate only with a steady state creatinine    Basic metabolic panel [[id number]]  (Abnormal) Collected:  10/12/19 1220    Lab Status:  Final result Specimen:  Blood from Arm, Right Updated:  10/12/19 1245     Sodium 130 mmol/L      Potassium 4 3 mmol/L      Chloride 93 mmol/L      CO2 29 mmol/L      ANION GAP 8 mmol/L      BUN 43 mg/dL      Creatinine 1 87 mg/dL      Glucose 101 mg/dL      Calcium 10 3 mg/dL      eGFR 24 ml/min/1 73sq m     Narrative:       [place] guidelines for Chronic Kidney Disease (CKD):     Stage 1 with normal or high GFR (GFR > 90 mL/min/1 73 square meters)    Stage 2 Mild CKD (GFR = 60-89 mL/min/1 73 square meters)    Stage 3A Moderate CKD (GFR = 45-59 mL/min/1 73 square meters)    Stage 3B Moderate CKD (GFR = 30-44 mL/min/1 73 square meters)    Stage 4 Severe CKD (GFR = 15-29 mL/min/1 73 square meters)    Stage 5 End Stage CKD (GFR <15 mL/min/1 73 square meters)  Note: GFR calculation is accurate only with a steady state creatinine          Imaging/EKG Studies: Xr Chest Portable    Result Date: 10/17/2019  Impression: Persistent cardiomegaly and central vascular congestion with small bilateral pleural effusions  Persistent patchy airspace densities in the right mid and lower lung  Right basilar pneumonia not excluded  Workstation performed: [id number]     Xr Chest Portable    Result Date: 10/16/2019  Impression: Stable cardiomegaly with mild pulmonary vascular congestion  Workstation performed: [id number]     Xr Chest Portable    Result Date: 10/15/2019  Impression: Cardiomegaly and mild pulmonary vascular congestion   Workstation performed: [id number]     Ct Head Without Contrast    Result Date: 10/12/2019  Impression: Right parafalcine extra-axial hemorrhage, stable No new extra-axial hemorrhage No mass effect or midline shift Chronic small vessel ischemic changes Workstation performed: [id number]         VTE Prophylaxis: Heparin

## 2019-10-18 NOTE — SOCIAL WORK
Pt will need updated therapy notes  No d/c today   Plan to d/c to [facility] tomorrow when a bed is available

## 2019-10-18 NOTE — ASSESSMENT & PLAN NOTE
- baseline appears to be 1 0-1 2  - will continue to trend kidney function  - a m   Labs pending  - patient did required another dose of Lasix to 40 mg today

## 2019-10-18 NOTE — OCCUPATIONAL THERAPY NOTE
Occupational Therapy Treatment Note:       10/18/19 1015   Restrictions/Precautions   Other Precautions Fall Risk; Chair Alarm; Bed Alarm;Cognitive;O2   Pain Assessment   Pain Assessment No/denies pain   Pain Score No Pain   ADL   Where Assessed Chair   Grooming Assistance 5  Supervision/Setup   UB Bathing Assistance 3  Moderate Assistance   UB Bathing Comments asst for skin folds and proper cleaning and drying  LB Bathing Assistance 3  Moderate Assistance   UB Dressing Assistance 4  Minimal Assistance   LB Dressing Assistance 3  Moderate Assistance   Toileting Assistance  1  Total Assistance   Toileting Comments incontinent of urine, and slightly of bowel, ta for loreto anal care   Functional Standing Tolerance   Time p + balance for toileting   Bed Mobility   Supine to Sit 3  Moderate assistance   Additional items   (hob elevated, pt sleeps on an elaborate lift chair)   Transfers   Sit to Stand 3  Moderate assistance   Stand to Sit 3  Moderate assistance   Stand pivot 3  Moderate assistance   Functional Mobility   Functional Mobility 3  Moderate assistance   Additional items Rolling walker   Cognition   Arousal/Participation Cooperative   Attention Within functional limits   Memory Decreased recall of precautions;Decreased recall of recent events   Following Commands Follows one step commands with increased time or repetition   Activity Tolerance   Activity Tolerance Patient limited by fatigue  (+sob)   Assessment   Assessment pt participated in am ot session and was seen focusing on adls and toileting / functional transfers, bed  mobility  pt was recently transfered to from icu with respritory issues she is not back on pphp6 , discussion with otr/l and plan to resume goals from ie   pt continues to require in pt rehab  she ermains on 3 l o2 with + sob  pulse oxy and pulse remain wfl  pt does requrie mod asst for sit to stand and trnasfers with rw as well as incrased time allowed   pt requires ta for incont management and for loreto anal care and clothing management  pt with poor plus standing balance and requires overall mod asst for lb adl's  pt reports she wearrs deppends at home and uses lhae for dressing  Plan   Treatment Interventions ADL retraining;Functional transfer training; Activityengagement;Patient/family training;Equipment evaluation/education; Endurance training;Cognitive reorientation   Goal Expiration Date 10/27/19   OT Treatment Day 3   OT Frequency 3-5x/wk   Recommendation   OT Discharge Recommendation Short Term Rehab   Barthel Index   Feeding 10   Bathing 0   Grooming Score 0   Dressing Score 5   Bladder Score 5   Bowels Score 5   Toilet Use Score 5   Transfers (Bed/Chair) Score 5   Mobility (Level Surface) Score 0   Stairs Score 0   Barthel Index Score 35   Modified Surprise Scale   Modified Surprise Scale 4   April A SHAWN Jean-Baptiste

## 2019-10-18 NOTE — SOCIAL WORK
No bed available at [facility] this weekend  Liaison spoke to pt and dtr about [address]  Family amenable  Plan to d/c there tomorrow   Transport set for [facility] with [facility] wheelchair

## 2019-10-18 NOTE — PHYSICAL THERAPY NOTE
PHYSICAL THERAPY NOTE          Patient Name: Johnny Yousif  FJOUW'[id number] Date: 10/18/2019     10/18/19 1500   Pain Assessment   Pain Assessment No/denies pain   Pain Score 5   Pain Type Chronic pain   Pain Location Shoulder   Pain Orientation Left   Restrictions/Precautions   Other Precautions Chair Alarm; Bed Alarm; Fall Risk;Pain;Multiple lines   General   Chart Reviewed Yes   Family/Caregiver Present No   Cognition   Orientation Level Oriented X4   Subjective   Subjective Agreeable to PT session   Bed Mobility   Supine to Sit Unable to assess  (pt found resting in chair)   Sit to Supine Unable to assess  (pt left resting in chair as requested)   Transfers   Sit to Stand 3  Moderate assistance   Stand to Sit 3  Moderate assistance   Stand pivot 3  Moderate assistance   Ambulation/Elevation   Gait pattern Excessively slow;Decreased foot clearance; Redundant gait at times; Shuffling   Gait Assistance 4  Minimal assist   Additional items Assist x 1   Assistive Device Rolling walker   Distance 30   Balance   Static Sitting Fair +   Dynamic Standing Poor   Ambulatory Fair -   Endurance Deficit   Endurance Deficit Yes   Endurance Deficit Description SOB, increased WOB   Activity Tolerance   Activity Tolerance Patient limited by fatigue   Nurse Made Aware yes   Assessment   Prognosis Fair   Problem List Decreased range of motion;Decreased strength;Decreased endurance; Impaired balance;Decreased mobility; Decreased safety awareness;Pain   Assessment Pt very motivated to complete therapy  REquired increased time to complete transfers with deficits in strength and balance  Ambulated with severely slow gait  Decreased foot clearance  Required frequent instruction during ambulation for RW distance and positioning  Pt will benefit from continued inpt skilled PT to maximize functional mobility & safety      Barriers to Discharge Inaccessible home environment Goals   Patient Goals To go to rehab   STG Expiration Date 10/27/19   PT Treatment Day 3   Plan   Treatment/Interventions Functional transfer training; Endurance training;Patient/family training;Bed mobility;Gait training;OT;Spoke to case management   Progress Progressing toward goals   PT Frequency Other (Comment)   Recommendation   Recommendation Post acute IP rehab   Equipment Recommended Veronica Faith   PT - OK to Discharge Yes   Dawna Yen, PT

## 2019-10-18 NOTE — ASSESSMENT & PLAN NOTE
Wt Readings from Last 3 Encounters:   10/17/19 86 2 kg (190 lb 0 6 oz)   10/10/19 84 3 kg (185 lb 14 4 oz)   10/08/19 84 1 kg (185 lb 6 5 oz)       - Lasix 40 mg given today  - O2 sats acceptable but patient with c/o SOB  - will give MDI  - Repeat CXR today on  10/16/19  - ABG pending  - respiratory consulted for evaluation of possible nebulizer treatments  - will continue to follow clinically

## 2019-10-19 ENCOUNTER — HOSPITAL ENCOUNTER (INPATIENT)
Facility: HOSPITAL | Age: 84
LOS: 2 days | DRG: 949 | End: 2019-10-21
Attending: PHYSICAL MEDICINE & REHABILITATION | Admitting: PHYSICAL MEDICINE & REHABILITATION
Payer: COMMERCIAL

## 2019-10-19 VITALS
WEIGHT: 190.04 LBS | SYSTOLIC BLOOD PRESSURE: 120 MMHG | HEART RATE: 76 BPM | TEMPERATURE: 97.7 F | DIASTOLIC BLOOD PRESSURE: 53 MMHG | BODY MASS INDEX: 38.31 KG/M2 | HEIGHT: 59 IN | OXYGEN SATURATION: 96 % | RESPIRATION RATE: 20 BRPM

## 2019-10-19 DIAGNOSIS — N18.9 ACUTE-ON-CHRONIC KIDNEY INJURY (HCC): ICD-10-CM

## 2019-10-19 DIAGNOSIS — N17.9 ACUTE-ON-CHRONIC KIDNEY INJURY (HCC): ICD-10-CM

## 2019-10-19 DIAGNOSIS — I10 HYPERTENSION: Primary | ICD-10-CM

## 2019-10-19 PROBLEM — R93.7 ABNORMAL CT SCAN, CERVICAL SPINE: Status: ACTIVE | Noted: 2019-01-09

## 2019-10-19 PROBLEM — E78.5 HYPERLIPIDEMIA: Status: ACTIVE | Noted: 2019-10-19

## 2019-10-19 PROBLEM — S06.5X9A SDH (SUBDURAL HEMATOMA) (HCC): Status: ACTIVE | Noted: 2019-10-19

## 2019-10-19 PROBLEM — S06.5XAA SDH (SUBDURAL HEMATOMA): Status: ACTIVE | Noted: 2019-10-19

## 2019-10-19 PROBLEM — D64.9 ANEMIA: Status: ACTIVE | Noted: 2019-10-19

## 2019-10-19 PROBLEM — Z95.2 S/P AVR: Status: ACTIVE | Noted: 2019-01-17

## 2019-10-19 PROBLEM — G47.33 OSA (OBSTRUCTIVE SLEEP APNEA): Status: ACTIVE | Noted: 2019-10-19

## 2019-10-19 PROCEDURE — NC001 PR NO CHARGE: Performed by: PHYSICIAN ASSISTANT

## 2019-10-19 PROCEDURE — 94760 N-INVAS EAR/PLS OXIMETRY 1: CPT

## 2019-10-19 PROCEDURE — 82746 ASSAY OF FOLIC ACID SERUM: CPT | Performed by: NURSE PRACTITIONER

## 2019-10-19 PROCEDURE — 94640 AIRWAY INHALATION TREATMENT: CPT

## 2019-10-19 PROCEDURE — 83550 IRON BINDING TEST: CPT | Performed by: NURSE PRACTITIONER

## 2019-10-19 PROCEDURE — NC001 PR NO CHARGE

## 2019-10-19 PROCEDURE — 83540 ASSAY OF IRON: CPT | Performed by: NURSE PRACTITIONER

## 2019-10-19 PROCEDURE — 99238 HOSP IP/OBS DSCHRG MGMT 30/<: CPT | Performed by: SURGERY

## 2019-10-19 PROCEDURE — 94664 DEMO&/EVAL PT USE INHALER: CPT

## 2019-10-19 PROCEDURE — 82728 ASSAY OF FERRITIN: CPT | Performed by: NURSE PRACTITIONER

## 2019-10-19 PROCEDURE — 99223 1ST HOSP IP/OBS HIGH 75: CPT | Performed by: PHYSICAL MEDICINE & REHABILITATION

## 2019-10-19 PROCEDURE — 94660 CPAP INITIATION&MGMT: CPT

## 2019-10-19 RX ORDER — GABAPENTIN 300 MG/1
300 CAPSULE ORAL 3 TIMES DAILY
Status: CANCELLED | OUTPATIENT
Start: 2019-10-19

## 2019-10-19 RX ORDER — LEVALBUTEROL 1.25 MG/.5ML
1.25 SOLUTION, CONCENTRATE RESPIRATORY (INHALATION)
Status: DISCONTINUED | OUTPATIENT
Start: 2019-10-19 | End: 2019-10-20

## 2019-10-19 RX ORDER — ACETAMINOPHEN 325 MG/1
650 TABLET ORAL EVERY 6 HOURS PRN
Status: DISCONTINUED | OUTPATIENT
Start: 2019-10-19 | End: 2019-10-21 | Stop reason: HOSPADM

## 2019-10-19 RX ORDER — TORSEMIDE 20 MG/1
40 TABLET ORAL DAILY
Status: CANCELLED | OUTPATIENT
Start: 2019-10-20

## 2019-10-19 RX ORDER — LEVETIRACETAM 500 MG/1
500 TABLET ORAL EVERY 12 HOURS SCHEDULED
Status: DISCONTINUED | OUTPATIENT
Start: 2019-10-19 | End: 2019-10-19

## 2019-10-19 RX ORDER — LEVETIRACETAM 500 MG/1
500 TABLET ORAL 2 TIMES DAILY
Status: CANCELLED | OUTPATIENT
Start: 2019-10-19

## 2019-10-19 RX ORDER — NYSTATIN 100000 [USP'U]/G
POWDER TOPICAL 2 TIMES DAILY
Status: DISCONTINUED | OUTPATIENT
Start: 2019-10-19 | End: 2019-10-21 | Stop reason: HOSPADM

## 2019-10-19 RX ORDER — LEVALBUTEROL 1.25 MG/.5ML
1.25 SOLUTION, CONCENTRATE RESPIRATORY (INHALATION)
Status: CANCELLED | OUTPATIENT
Start: 2019-10-19

## 2019-10-19 RX ORDER — HEPARIN SODIUM 5000 [USP'U]/ML
5000 INJECTION, SOLUTION INTRAVENOUS; SUBCUTANEOUS EVERY 8 HOURS SCHEDULED
Status: DISCONTINUED | OUTPATIENT
Start: 2019-10-19 | End: 2019-10-19

## 2019-10-19 RX ORDER — METHOCARBAMOL 500 MG/1
500 TABLET, FILM COATED ORAL 3 TIMES DAILY PRN
Status: CANCELLED | OUTPATIENT
Start: 2019-10-19

## 2019-10-19 RX ORDER — ONDANSETRON 2 MG/ML
4 INJECTION INTRAMUSCULAR; INTRAVENOUS EVERY 6 HOURS PRN
Status: CANCELLED | OUTPATIENT
Start: 2019-10-19

## 2019-10-19 RX ORDER — SERTRALINE HYDROCHLORIDE 25 MG/1
25 TABLET, FILM COATED ORAL
Status: CANCELLED | OUTPATIENT
Start: 2019-10-19

## 2019-10-19 RX ORDER — TORSEMIDE 20 MG/1
40 TABLET ORAL DAILY
Status: DISCONTINUED | OUTPATIENT
Start: 2019-10-20 | End: 2019-10-21

## 2019-10-19 RX ORDER — DOCUSATE SODIUM 100 MG/1
100 CAPSULE, LIQUID FILLED ORAL 2 TIMES DAILY
Status: CANCELLED | OUTPATIENT
Start: 2019-10-19

## 2019-10-19 RX ORDER — LORAZEPAM 0.5 MG/1
0.5 TABLET ORAL EVERY 8 HOURS PRN
Status: CANCELLED | OUTPATIENT
Start: 2019-10-19

## 2019-10-19 RX ORDER — LEVOTHYROXINE SODIUM 112 UG/1
112 TABLET ORAL DAILY
Status: CANCELLED | OUTPATIENT
Start: 2019-10-20

## 2019-10-19 RX ORDER — ACETAMINOPHEN 325 MG/1
650 TABLET ORAL EVERY 6 HOURS PRN
Status: CANCELLED | OUTPATIENT
Start: 2019-10-19

## 2019-10-19 RX ORDER — METHOCARBAMOL 500 MG/1
500 TABLET, FILM COATED ORAL EVERY 8 HOURS PRN
Status: DISCONTINUED | OUTPATIENT
Start: 2019-10-19 | End: 2019-10-21 | Stop reason: HOSPADM

## 2019-10-19 RX ORDER — ALBUTEROL SULFATE 2.5 MG/3ML
2.5 SOLUTION RESPIRATORY (INHALATION) EVERY 4 HOURS PRN
Status: CANCELLED | OUTPATIENT
Start: 2019-10-19

## 2019-10-19 RX ORDER — LEVOTHYROXINE SODIUM 112 UG/1
112 TABLET ORAL DAILY
Status: DISCONTINUED | OUTPATIENT
Start: 2019-10-20 | End: 2019-10-21 | Stop reason: HOSPADM

## 2019-10-19 RX ORDER — AMLODIPINE BESYLATE 5 MG/1
5 TABLET ORAL EVERY MORNING
Status: DISCONTINUED | OUTPATIENT
Start: 2019-10-20 | End: 2019-10-21 | Stop reason: HOSPADM

## 2019-10-19 RX ORDER — NYSTATIN 100000 [USP'U]/G
POWDER TOPICAL 2 TIMES DAILY
Status: CANCELLED | OUTPATIENT
Start: 2019-10-19

## 2019-10-19 RX ORDER — ALBUTEROL SULFATE 90 UG/1
2 AEROSOL, METERED RESPIRATORY (INHALATION) EVERY 4 HOURS PRN
Status: CANCELLED | OUTPATIENT
Start: 2019-10-19

## 2019-10-19 RX ORDER — HEPARIN SODIUM 5000 [USP'U]/ML
5000 INJECTION, SOLUTION INTRAVENOUS; SUBCUTANEOUS EVERY 8 HOURS SCHEDULED
Status: CANCELLED | OUTPATIENT
Start: 2019-10-19

## 2019-10-19 RX ORDER — POLYETHYLENE GLYCOL 3350 17 G/17G
17 POWDER, FOR SOLUTION ORAL DAILY PRN
Status: DISCONTINUED | OUTPATIENT
Start: 2019-10-19 | End: 2019-10-21 | Stop reason: HOSPADM

## 2019-10-19 RX ORDER — GABAPENTIN 300 MG/1
300 CAPSULE ORAL 3 TIMES DAILY
Status: DISCONTINUED | OUTPATIENT
Start: 2019-10-19 | End: 2019-10-21 | Stop reason: HOSPADM

## 2019-10-19 RX ORDER — AMLODIPINE BESYLATE 5 MG/1
5 TABLET ORAL EVERY MORNING
Status: CANCELLED | OUTPATIENT
Start: 2019-10-20

## 2019-10-19 RX ADMIN — IPRATROPIUM BROMIDE 0.5 MG: 0.5 SOLUTION RESPIRATORY (INHALATION) at 20:50

## 2019-10-19 RX ADMIN — LEVALBUTEROL 1.25 MG: 1.25 SOLUTION, CONCENTRATE RESPIRATORY (INHALATION) at 20:50

## 2019-10-19 RX ADMIN — NYSTATIN: 100000 POWDER TOPICAL at 17:55

## 2019-10-19 RX ADMIN — GABAPENTIN 300 MG: 300 CAPSULE ORAL at 21:19

## 2019-10-19 RX ADMIN — LEVETIRACETAM 500 MG: 500 TABLET, FILM COATED ORAL at 09:24

## 2019-10-19 RX ADMIN — GABAPENTIN 300 MG: 300 CAPSULE ORAL at 15:36

## 2019-10-19 RX ADMIN — LEVOTHYROXINE SODIUM 112 MCG: 112 TABLET ORAL at 09:24

## 2019-10-19 RX ADMIN — IPRATROPIUM BROMIDE 0.5 MG: 0.5 SOLUTION RESPIRATORY (INHALATION) at 17:06

## 2019-10-19 RX ADMIN — HEPARIN SODIUM 5000 UNITS: 5000 INJECTION INTRAVENOUS; SUBCUTANEOUS at 15:37

## 2019-10-19 RX ADMIN — GABAPENTIN 300 MG: 300 CAPSULE ORAL at 09:24

## 2019-10-19 RX ADMIN — SERTRALINE HYDROCHLORIDE 25 MG: 50 TABLET, FILM COATED ORAL at 21:20

## 2019-10-19 RX ADMIN — DOCUSATE SODIUM 100 MG: 100 CAPSULE, LIQUID FILLED ORAL at 09:24

## 2019-10-19 RX ADMIN — LORAZEPAM 0.5 MG: 0.5 TABLET ORAL at 02:02

## 2019-10-19 RX ADMIN — HEPARIN SODIUM 5000 UNITS: 5000 INJECTION INTRAVENOUS; SUBCUTANEOUS at 05:15

## 2019-10-19 RX ADMIN — ACETAMINOPHEN 650 MG: 325 TABLET ORAL at 15:36

## 2019-10-19 RX ADMIN — METOPROLOL TARTRATE 25 MG: 25 TABLET, FILM COATED ORAL at 09:24

## 2019-10-19 RX ADMIN — NYSTATIN: 100000 POWDER TOPICAL at 09:25

## 2019-10-19 RX ADMIN — LEVALBUTEROL 1.25 MG: 1.25 SOLUTION, CONCENTRATE RESPIRATORY (INHALATION) at 17:06

## 2019-10-19 RX ADMIN — METOPROLOL TARTRATE 25 MG: 25 TABLET ORAL at 21:19

## 2019-10-19 RX ADMIN — LEVALBUTEROL HYDROCHLORIDE 1.25 MG: 1.25 SOLUTION, CONCENTRATE RESPIRATORY (INHALATION) at 07:18

## 2019-10-19 RX ADMIN — AMLODIPINE BESYLATE 5 MG: 5 TABLET ORAL at 09:24

## 2019-10-19 RX ADMIN — IPRATROPIUM BROMIDE 0.5 MG: 0.5 SOLUTION RESPIRATORY (INHALATION) at 07:17

## 2019-10-19 RX ADMIN — METHOCARBAMOL 500 MG: 500 TABLET ORAL at 15:36

## 2019-10-19 RX ADMIN — TORSEMIDE 40 MG: 20 TABLET ORAL at 09:24

## 2019-10-19 NOTE — PROGRESS NOTES
Progress Note - Audelia Mackay 1934, 80 y o  female MRN: [id number]    Unit/Bed#: [facility] 609-01 Encounter: [id number]    Primary Care Provider: Dima Delgado DO   Date and time admitted to hospital: 10/12/2019 11:07 AM        History of COPD  Assessment & Plan  - continue dual nebulizers  - off O2    Traumatic subdural hematoma without loss of consciousness (HCC)  Assessment & Plan  - GCS 15, stable neurovascular exam  - continue keppra prophylaxis    Acute kidney injury ([address] )  Assessment & Plan  - renal function stable  - home meds resumed    Chronic diastolic heart failure (HCC)  Assessment & Plan  - daily weights  - started on demadex    * Ambulatory dysfunction  Assessment & Plan  - PT/OT  - Gerontology consult  - rehab placement [address] today      Bedside rounds completed with nurse yes  Disposition: inpatient rehab    SUBJECTIVE:  Chief Complaint: I want to sit up    Subjective: no acute events overnight  Denies SOB, otherwise doing well        OBJECTIVE:     Meds/Allergies     Current Facility-Administered Medications:     acetaminophen (TYLENOL) tablet 650 mg, 650 mg, Oral, Q6H PRN, Gisela Alvarez MD, 650 mg at 10/18/19 0029    albuterol (PROVENTIL HFA,VENTOLIN HFA) inhaler 2 puff, 2 puff, Inhalation, Q4H PRN, Gisela Alvarez MD, 2 puff at 10/16/19 1415    albuterol inhalation solution 2 5 mg, 2 5 mg, Nebulization, Q4H PRN, Gisela Alvarez MD, 2 5 mg at 10/17/19 0910    amLODIPine (NORVASC) tablet 5 mg, 5 mg, Oral, QAM, Gisela Alvarez MD, 5 mg at 10/18/19 0917    docusate sodium (COLACE) capsule 100 mg, 100 mg, Oral, BID, Gisela Alvarez MD, 100 mg at 10/16/19 0821    gabapentin (NEURONTIN) capsule 300 mg, 300 mg, Oral, TID, Gisela Alvarez MD, 300 mg at 10/18/19 2130    heparin (porcine) subcutaneous injection 5,000 Units, 5,000 Units, Subcutaneous, Q8H [address], 5,000 Units at 10/19/19 0515 **AND** [COMPLETED] Platelet count, , , Once, Maia Fraire MD    ipratropium (ATROVENT) 0 02 % inhalation solution 0 5 mg, 0 5 mg, Nebulization, TID, Gisela Alvarez MD, 0 5 mg at 10/19/19 0717    levalbuterol ([place]) inhalation solution 1 25 mg, 1 25 mg, Nebulization, TID, Gisela Alvarez MD, 1 25 mg at 10/19/19 0718    levETIRAcetam (KEPPRA) tablet 500 mg, 500 mg, Oral, BID, Gisela Alvarez MD, 500 mg at 10/18/19 1717    levothyroxine tablet 112 mcg, 112 mcg, Oral, Daily, Gisela Alvarez MD, 112 mcg at 10/18/19 0917    LORazepam (ATIVAN) tablet 0 5 mg, 0 5 mg, Oral, Q8H PRN, Gisela Alvarez MD, 0 5 mg at 10/19/19 0202    methocarbamol (ROBAXIN) tablet 500 mg, 500 mg, Oral, TID PRN, Guicho Alvarez MD, 500 mg at 10/18/19 1716    metoprolol tartrate (LOPRESSOR) tablet 25 mg, 25 mg, Oral, BID, Gisela Alvarez MD, 25 mg at 10/18/19 1716    nystatin (MYCOSTATIN) powder, , Topical, BID, Gisela Alvarez MD    ondansetron (ZOFRAN) injection 4 mg, 4 mg, Intravenous, Q6H PRN, Gisela Alvarez MD    sertraline (ZOLOFT) tablet 25 mg, 25 mg, Oral, HS, Gisela Alvarez MD, 25 mg at 10/18/19 2130    torsemide (DEMADEX) tablet 40 mg, 40 mg, Oral, Daily, Gisela Alvarez MD, 40 mg at 10/18/19 0917     Vitals:   Vitals:    10/19/19 0718   BP:    Pulse:    Resp:    Temp:    SpO2: 100%       Intake/Output:  I/O       10/17 0701 - 10/18 0700 10/18 0701 - 10/19 0700 10/19 0701 - 10/20 0700    P  O  700 720     Total Intake(mL/kg) 700 (8 1) 720 (8 4)     Urine (mL/kg/hr) 1300 (0 6) 300 (0 1)     Stool 0 0     Total Output 1300 300     Net -600 +420            Unmeasured Urine Occurrence 1 x      Unmeasured Stool Occurrence 1 x 1 x            Nutrition/GI Proph/Bowel Reg: Cardiac 2g sodium    Physical Exam:   GENERAL APPEARANCE: no acute distress, awake and alert, oriented  NEURO: non-focal, 5/5 strength bilateral upper and lower extremities  HEENT: normocephalic  CV: regular rate  LUNGS: mild wheezes  GI: soft NT ND  : unremarkable  MSK: moves all extremities  SKIN: intact    Invasive Devices     Peripheral Intravenous Line Peripheral IV 10/17/19 Right Antecubital 1 day          Drain            External Urinary Catheter 5 days                 Lab Results: BMP/CMP: No results found for: SODIUM, K, CL, CO2, ANIONGAP, BUN, CREATININE, GLUCOSE, CALCIUM, AST, ALT, ALKPHOS, PROT, BILITOT, EGFR and CBC: No results found for: WBC, HGB, HCT, MCV, PLT, ADJUSTEDWBC, MCH, MCHC, RDW, MPV, NRBC  Imaging/EKG Studies: Results: I have personally reviewed pertinent reports      Other Studies: n/a  VTE Prophylaxis: Heparin

## 2019-10-19 NOTE — PLAN OF CARE
Problem: Potential for Falls  Goal: Patient will remain free of falls  Description  INTERVENTIONS:  - Assess patient frequently for physical needs  -  Identify cognitive and physical deficits and behaviors that affect risk of falls    -  [place] fall precautions as indicated by assessment   - Educate patient/family on patient safety including physical limitations  - Instruct patient to call for assistance with activity based on assessment  - Modify environment to reduce risk of injury  - Consider OT/PT consult to assist with strengthening/mobility  10/19/2019 1200 by Mary Gaines RN  Outcome: Not Progressing  10/19/2019 1200 by Mary Gaines RN  Outcome: Not Progressing     Problem: PAIN - ADULT  Goal: Verbalizes/displays adequate comfort level or baseline comfort level  Description  Interventions:  - Encourage patient to monitor pain and request assistance  - Assess pain using appropriate pain scale  - Administer analgesics based on type and severity of pain and evaluate response  - Implement non-pharmacological measures as appropriate and evaluate response  - Consider cultural and social influences on pain and pain management  - Notify physician/advanced practitioner if interventions unsuccessful or patient reports new pain  10/19/2019 1200 by Mary Gaines RN  Outcome: Not Progressing  10/19/2019 1200 by Mary Gaines RN  Outcome: Not Progressing     Problem: INFECTION - ADULT  Goal: Absence or prevention of progression during hospitalization  Description  INTERVENTIONS:  - Assess and monitor for signs and symptoms of infection  - Monitor lab/diagnostic results  - Monitor all insertion sites, i e  indwelling lines, tubes, and drains  - Monitor endotracheal if appropriate and nasal secretions for changes in amount and color  - [place] appropriate cooling/warming therapies per order  - Administer medications as ordered  - Instruct and encourage patient and family to use good hand hygiene technique  - Identify and instruct in appropriate isolation precautions for identified infection/condition  10/19/2019 1200 by Karin Bowden RN  Outcome: Not Progressing  10/19/2019 1200 by Karin Bowden RN  Outcome: Not Progressing  Goal: Absence of fever/infection during neutropenic period  Description  INTERVENTIONS:  - Monitor WBC    10/19/2019 1200 by Karin Bowden RN  Outcome: Not Progressing  10/19/2019 1200 by Karin Bowden RN  Outcome: Not Progressing     Problem: SAFETY ADULT  Goal: Maintain or return to baseline ADL function  Description  INTERVENTIONS:  -  Assess patient's ability to carry out ADLs; assess patient's baseline for ADL function and identify physical deficits which impact ability to perform ADLs (bathing, care of mouth/teeth, toileting, grooming, dressing, etc )  - Assess/evaluate cause of self-care deficits   - Assess range of motion  - Assess patient's mobility; develop plan if impaired  - Assess patient's need for assistive devices and provide as appropriate  - Encourage maximum independence but intervene and supervise when necessary  - Involve family in performance of ADLs  - Assess for home care needs following discharge   - Consider OT consult to assist with ADL evaluation and planning for discharge  - Provide patient education as appropriate  10/19/2019 1200 by Karin Bowden RN  Outcome: Not Progressing  10/19/2019 1200 by Karin Bowden RN  Outcome: Not Progressing  Goal: Maintain or return mobility status to optimal level  Description  INTERVENTIONS:  - Assess patient's baseline mobility status (ambulation, transfers, stairs, etc )    - Identify cognitive and physical deficits and behaviors that affect mobility  - Identify mobility aids required to assist with transfers and/or ambulation (gait belt, sit-to-stand, lift, walker, cane, etc )  - [place] fall precautions as indicated by assessment  - Record patient progress and toleration of activity level on Mobility SBAR; progress patient to next Phase/Stage  - Instruct patient to call for assistance with activity based on assessment  - Consider rehabilitation consult to assist with strengthening/weightbearing, etc   10/19/2019 1200 by Lavern Jauregui RN  Outcome: Not Progressing  10/19/2019 1200 by Lavern Jauregui RN  Outcome: Not Progressing     Problem: DISCHARGE PLANNING  Goal: Discharge to home or other facility with appropriate resources  Description  INTERVENTIONS:  - Identify barriers to discharge w/patient and caregiver  - Arrange for needed discharge resources and transportation as appropriate  - Identify discharge learning needs (meds, wound care, etc )  - Arrange for interpretive services to assist at discharge as needed  - Refer to Case Management Department for coordinating discharge planning if the patient needs post-hospital services based on physician/advanced practitioner order or complex needs related to functional status, cognitive ability, or social support system  10/19/2019 1200 by Lavern Jauregui RN  Outcome: Not Progressing  10/19/2019 1200 by Lavern Jauregui RN  Outcome: Not Progressing     Problem: SKIN/TISSUE INTEGRITY - ADULT  Goal: Skin integrity remains intact  Description  INTERVENTIONS  - Identify patients at risk for skin breakdown  - Assess and monitor skin integrity  - Assess and monitor nutrition and hydration status  - Monitor labs (i e  albumin)  - Assess for incontinence   - Turn and reposition patient  - Assist with mobility/ambulation  - Relieve pressure over bony prominences  - Avoid friction and shearing  - Provide appropriate hygiene as needed including keeping skin clean and dry  - Evaluate need for skin moisturizer/barrier cream  - Collaborate with interdisciplinary team (i e  Nutrition, Rehabilitation, etc )   - Patient/family teaching  10/19/2019 1200 by Lavern Jauregui RN  Outcome: Not Progressing  10/19/2019 1200 by Lavern Jauregui RN  Outcome: Not Progressing  Goal: Incision(s), wounds(s) or drain site(s) healing without S/S of infection  Description  INTERVENTIONS  - Assess and document risk factors for skin impairment   - Assess and document dressing, incision, wound bed, drain sites and surrounding tissue  - Consider nutrition services referral as needed  - Oral mucous membranes remain intact  - Provide patient/ family education  10/19/2019 1200 by Dillon Rose RN  Outcome: Not Progressing  10/19/2019 1200 by Dillon Rose RN  Outcome: Not Progressing  Goal: Oral mucous membranes remain intact  Description  INTERVENTIONS  - Assess oral mucosa and hygiene practices  - Implement preventative oral hygiene regimen  - Implement oral medicated treatments as ordered  - Initiate Nutrition services referral as needed  10/19/2019 1200 by Dillon Rose RN  Outcome: Not Progressing  10/19/2019 1200 by Dillon Rose RN  Outcome: Not Progressing     Problem: HEMATOLOGIC - ADULT  Goal: Maintains hematologic stability  Description  INTERVENTIONS  - Assess for signs and symptoms of bleeding or hemorrhage  - Monitor labs  - Administer supportive blood products/factors as ordered and appropriate  10/19/2019 1200 by Dillon Rose RN  Outcome: Not Progressing  10/19/2019 1200 by Dillon Rose RN  Outcome: Not Progressing     Problem: MUSCULOSKELETAL - ADULT  Goal: Maintain or return mobility to safest level of function  Description  INTERVENTIONS:  - Assess patient's ability to carry out ADLs; assess patient's baseline for ADL function and identify physical deficits which impact ability to perform ADLs (bathing, care of mouth/teeth, toileting, grooming, dressing, etc )  - Assess/evaluate cause of self-care deficits   - Assess range of motion  - Assess patient's mobility  - Assess patient's need for assistive devices and provide as appropriate  - Encourage maximum independence but intervene and supervise when necessary  - Involve family in performance of ADLs  - Assess for home care needs following discharge   - Consider OT consult to assist with ADL evaluation and planning for discharge  - Provide patient education as appropriate  10/19/2019 1200 by Brigid Barbour RN  Outcome: Not Progressing  10/19/2019 1200 by Brigid Barbour RN  Outcome: Not Progressing  Goal: Maintain proper alignment of affected body part  Description  INTERVENTIONS:  - Support, maintain and protect limb and body alignment  - Provide patient/ family with appropriate education  10/19/2019 1200 by Brigid Barbour RN  Outcome: Not Progressing  10/19/2019 1200 by Brigid Barbour RN  Outcome: Not Progressing     Problem: Prexisting or High Potential for Compromised Skin Integrity  Goal: Skin integrity is maintained or improved  Description  INTERVENTIONS:  - Identify patients at risk for skin breakdown  - Assess and monitor skin integrity  - Assess and monitor nutrition and hydration status  - Monitor labs   - Assess for incontinence   - Turn and reposition patient  - Assist with mobility/ambulation  - Relieve pressure over bony prominences  - Avoid friction and shearing  - Provide appropriate hygiene as needed including keeping skin clean and dry  - Evaluate need for skin moisturizer/barrier cream  - Collaborate with interdisciplinary team   - Patient/family teaching  - Consider wound care consult   10/19/2019 1200 by Brigid Barbour RN  Outcome: Not Progressing  10/19/2019 1200 by Brigid Barbour RN  Outcome: Not Progressing     Problem: RESPIRATORY - ADULT  Goal: Achieves optimal ventilation and oxygenation  Description  INTERVENTIONS:  - Assess for changes in respiratory status  - Assess for changes in mentation and behavior  - Position to facilitate oxygenation and minimize respiratory effort  - Oxygen administered by appropriate delivery if ordered  - Initiate smoking cessation education as indicated  - Encourage broncho-pulmonary hygiene including cough, deep breathe, Incentive Spirometry  - Assess the need for suctioning and aspirate as needed  - Assess and instruct to report SOB or any respiratory difficulty  - Respiratory Therapy support as indicated  10/19/2019 1200 by Romeo Blackwell RN  Outcome: Not Progressing  10/19/2019 1200 by Romeo Blackwell, RN  Outcome: Not Progressing

## 2019-10-19 NOTE — PROGRESS NOTES
PHYSICAL MEDICINE AND REHABILITATION   PREADMISSION ASSESSMENT     Projected [facility] Diagnoses:  Impairment of mobility, safety and Activities of Daily Living (ADLs) due to Brain Dysfunction:  02 22  Traumatic, Closed Injury   Etiologic Dx: Traumatic SDH s/p Fall  Date of Onset: 10/8/2019   Date of surgery: N/A    PATIENT INFORMATION  Name: Twila Ferrari Phone #: [phone] (home)   Address: [address] [place] [id number]  YOB: 1934 Age: 80 y o  SS#   Marital Status:   Ethnicity:   Employment Status: retired  Extended Emergency Contact Information  Primary Emergency Contact: Vinayak Host  Address: [address] Phone: [phone]  Mobile Phone: [phone]  Relation: Daughter  Advance Directive: Level 1 Full Code - advanced directive as filed    INSURANCE/COVERAGE:     Primary Payor: [place] [employer]  REP / Plan: [employer] REP / Product Type: Medicare HMO /   Secondary Payer: Private Pay   Payer Contact: Dao Tabor Payer Contact:   Contact Phone: [phone]  Fax #: [phone] Contact Phone:   Authorization #: [id number]  Coverage Dates: 10/19-10/25 (7 days)  LCD: 10/25 with review  Medicare #: [id number]   Medicare Days:  Medical Record #: [id number]    REFERRAL SOURCE:   Referring provider: Rosa Ward MD  Referring facility: [address] [place] [address]  Room: [address] [place] [address] 609/[facility] [id number]  PCP: Elizabeth Andrade DO PCP phone number: [phone]    MEDICAL INFORMATION  HPI: Patient is an 80year old female that presented to [address] [place] [address] for evaluation s/p fall at home  Per patient's daughter, patient sustained the fall due to leg weakness when trying to go up the stairs  Patient denies hitting her head or loss of consciousness   Patient was recently hospitalized at [address] [place] [address] (10/8 - 10/10) following a fall while there for an outpatient visit  Imaging revealed a traumatic subdural hematoma  Patient was on Plavix, which was reversed with DDAVP  Patient was evaluated by Neurosurgery, who recommended conservative management of SDH, and patient was discharged home with family support  On current presentation, patient's daughter states that patient has been more weak since at home, causing fall leading to presentation  Lab work was notable for hyponatremia and HERLINDA, with Lasix placed on hold  CT of the head showed right parafalcine extra-axial hemorrhage, stable; no new hemorrhage; chronic small vessel ischemic changes  Patient was continued on Keppra BID for seizure prophylaxis, with seizure precautions in place  Patient was noted with mild dyspnea, and was given MDI and small dose IV Lasix x2, with noted improvement  CXR showed cardiomegaly and mild pulmonary vascular congestion  ECHO completed in August of 2019 showed an EF of 55%, moderate hypokinesis of mid anteroseptal wall, grade 2 diastolic dysfunction; LA mildy dilated; mild to moderate MV annular calcification and mild regurgitation  The patient was due to transfer to Acute Rehab on 10/16/19 however MD was called to the patients room as the patient was noted to be in some respiratory distress and feeling SOB  Her SPO2 was noted to be 88% and she was placed on bipap which increased her SPO2 to 98%  She was transferred to the ICU  A chest xray was completed which appeared slightly worse from her previous chest xray which was done on 10/15/19  The patient was eventually titrated off of the bipap  It was felt her SOB was likely a CHF exacerbation and the patient was diuresed  She did have a femoral CVC placed  Patient is currently satting in upper 90's on RA, with no signs or symptoms of respiratory distress  Patient has remained hemodynamically stable, and is medically cleared for discharge to [facility] later today   Patient is currently on a Cardiac, 2gm Na diet, and is tolerating well with aspiration precautions in place  PT/OT therapies were consulted, and they are recommending patient for inpatient Acute Rehab  She has demonstrated that she can tolerate and participate in 3 hours of therapy per day  Past Medical History:   Past Surgical History: Allergies:     Past Medical History:   Diagnosis Date    Arthritis     Breast cancer ([facility] ) 2015    Cardiac disease     aortic valve transplant    CHF (congestive heart failure) (formerly [facility])     Compression fracture of body of thoracic vertebra (formerly [facility])     CVA (cerebral vascular accident) ([facility] )     Diabetes mellitus ([facility] )     Disease of thyroid gland     Fibromyalgia, primary     H/O cervical fracture 01/09/2019    Hyperlipidemia     Hypertension     Neuropathy     Pressure injury of skin     Renal disorder     kidney stent    Stroke [facility])     Past Surgical History:   Procedure Laterality Date    AORTIC VALVE SURGERY      BREAST LUMPECTOMY Right     BREAST LUMPECTOMY Left     BREAST SURGERY      lumpectomy for breast cancer    CATARACT EXTRACTION      CHOLECYSTECTOMY      HYSTERECTOMY  1978    KIDNEY SURGERY      stent placed for kidney    OTHER SURGICAL HISTORY      abdominal aneurysm surgery    CO ARTHRODESIS POSTERIOR ATLAS-AXIS C1-C2 N/A 5/1/2019    Procedure: C1-C2 lateral mass fixation fusion with possible sublaminar cables;  Surgeon: Vanessa Benavides MD;  [facility]: [facility];  Service: Neurosurgery    REPLACEMENT TOTAL KNEE      left      Allergies   Allergen Reactions    Duloxetine Hcl Other (See Comments) and Hypertension    Duloxetine Hcl      Cymbalta;  Hemorrhagic stroke listed as reaction    Escitalopram      Other reaction(s): Urinary Retention    Pregabalin      Other reaction(s): Hypertension    Statins Myalgia    Tramadol      Other reaction(s): Hypertension    Triprolidine-Pse Other (See Comments)    Anastrozole Abdominal Pain    Anastrozole     Antihistamines, Diphenhydramine-Type  Exemestane      Aromasin; Muscle pain & cramps    Lexapro [Escitalopram]      Urinary retention    Lyrica [Pregabalin]      hypertension    Metformin      diarrhea    Oxycodone      Pt unsure      Statins      Muscle pain & cramps    Tramadol      hypertension    Triprolidine-Pseudoephedrine     Metformin Diarrhea         Comorbidities: Falls, generalized weakness, hyponatremia, HERLINDA, CHF, COPD, chronic back pain, AV transplant, CVA, DM, hypothyroid, fibromyalgia, HLD, HTN, C1-C2 lateral mass fixation/fusion 5/1/19  CURRENT VITAL SIGNS:   Temp:  [97 7 °F (36 5 °C)] 97 7 °F (36 5 °C)  HR:  [69-76] 76  Resp:  [20] 20  BP: (120-124)/(53-67) 120/53   Intake/Output Summary (Last 24 hours) at 10/19/2019 0821  Last data filed at 10/18/2019 1930  Gross per 24 hour   Intake 720 ml   Output 300 ml   Net 420 ml        LABORATORY RESULTS:      Lab Results   Component Value Date    HGB 8 3 (L) 10/18/2019    HGB 11 9 08/27/2015    HCT 26 9 (L) 10/18/2019    HCT 36 2 08/27/2015    WBC 6 25 10/18/2019    WBC 5 88 08/27/2015     Lab Results   Component Value Date    BUN 37 (H) 10/18/2019    BUN 20 11/06/2015     11/06/2015    K 4 1 10/18/2019    K 4 8 11/06/2015     10/18/2019     11/06/2015    GLUCOSE 41 (L) 05/01/2019    GLUCOSE 82 11/06/2015    CREATININE 1 53 (H) 10/18/2019    CREATININE 1 19 11/06/2015     Lab Results   Component Value Date    PROTIME 13 1 10/08/2019    PROTIME 12 6 08/27/2015    INR 1 05 10/08/2019    INR 1 00 08/27/2015        DIAGNOSTIC STUDIES:  Xr Chest Portable    Result Date: 10/15/2019  Impression: Cardiomegaly and mild pulmonary vascular congestion   Workstation performed: [id number]     Ct Head Without Contrast    Result Date: 10/12/2019  Impression: Right parafalcine extra-axial hemorrhage, stable No new extra-axial hemorrhage No mass effect or midline shift Chronic small vessel ischemic changes Workstation performed: [id number]       PRECAUTIONS/SPECIAL NEEDS:  Tobacco:   Social History     Tobacco Use   Smoking Status Never Smoker   Smokeless Tobacco Never Used   , Alcohol:    Social History     Substance and Sexual Activity   Alcohol Use Not Currently    Frequency: Never    Binge frequency: Never   , Anticoagulation:  heparin, Blood Sugar Management: as per MD recommendations, Edema Management, Safety Concerns, Pain Management, Aspiration Risk/Precautions, Dietary Restrictions: Cardiac/2gm Na, Language Preference: English, Seizure Precautions and Fall Precautions      MEDICATIONS:     Current Facility-Administered Medications:     acetaminophen (TYLENOL) tablet 650 mg, 650 mg, Oral, Q6H PRN, Gisela Alvarez MD, 650 mg at 10/18/19 0029    albuterol (PROVENTIL HFA,VENTOLIN HFA) inhaler 2 puff, 2 puff, Inhalation, Q4H PRN, Gisela Alvarez MD, 2 puff at 10/16/19 1415    albuterol inhalation solution 2 5 mg, 2 5 mg, Nebulization, Q4H PRN, Gisela Alvarez MD, 2 5 mg at 10/17/19 0910    amLODIPine (NORVASC) tablet 5 mg, 5 mg, Oral, QAM, Gisela Alvarez MD, 5 mg at 10/18/19 0917    docusate sodium (COLACE) capsule 100 mg, 100 mg, Oral, BID, Gisela Alvarez MD, 100 mg at 10/16/19 0821    gabapentin (NEURONTIN) capsule 300 mg, 300 mg, Oral, TID, Gisela Alvarez MD, 300 mg at 10/18/19 2130    heparin (porcine) subcutaneous injection 5,000 Units, 5,000 Units, Subcutaneous, Q8H [address], 5,000 Units at 10/19/19 0515 **AND** [COMPLETED] Platelet count, , , Once, Sariah Barkley MD    ipratropium (ATROVENT) 0 02 % inhalation solution 0 5 mg, 0 5 mg, Nebulization, TID, Gisela Alvarez MD, 0 5 mg at 10/19/19 0717    levalbuterol (XOPENEX) inhalation solution 1 25 mg, 1 25 mg, Nebulization, TID, Gisela Alvarez MD, 1 25 mg at 10/19/19 0718    levETIRAcetam (KEPPRA) tablet 500 mg, 500 mg, Oral, BID, Gisela Alvarez MD, 500 mg at 10/18/19 1717    levothyroxine tablet 112 mcg, 112 mcg, Oral, Daily, Gisela Alvarez MD, 112 mcg at 10/18/19 0917    LORazepam (ATIVAN) tablet 0 5 mg, 0 5 mg, Oral, Q8H PRN, Gisela Alvarez MD, 0 5 mg at 10/19/19 0202    methocarbamol (ROBAXIN) tablet 500 mg, 500 mg, Oral, TID PRN, Malmarciano Alvarez MD, 500 mg at 10/18/19 1716    metoprolol tartrate (LOPRESSOR) tablet 25 mg, 25 mg, Oral, BID, Gisela Alvarez MD, 25 mg at 10/18/19 1716    nystatin (MYCOSTATIN) powder, , Topical, BID, Gisela Alvarez MD    ondansetron (ZOFRAN) injection 4 mg, 4 mg, Intravenous, Q6H PRN, Gisela Alvarez MD    sertraline (ZOLOFT) tablet 25 mg, 25 mg, Oral, HS, Gisela Alvarez MD, 25 mg at 10/18/19 2130    torsemide (DEMADEX) tablet 40 mg, 40 mg, Oral, Daily, Gisela Alvarez MD, 40 mg at 10/18/19 0917    SKIN INTEGRITY:   no rashes, erythema - buttock(s) bilateral, excoriation - buttock(s) bilateral; skin tear sacrum with foam dressing  PRIOR LEVEL OF FUNCTION:  She lives in a(n) apartment attached to daughter's home  Enoch Ogden is  and lives with their daughter and family  Self Care: Independent, Indoor Mobility: Modified Independent, Stairs (in/outdoor): Modified Independent and Cognition: Independent  Prior to admission, patient was Modified Independent with RW for gait and transfers, and only required assistance with transportation, which daughter provided  Of note, patient has sustained 2 falls in the last 6 months (both which led to hospitalization)  HOME ENVIRONMENT:  The living area: can live on one level  There are 3 steps to enter vs Ramped entrance to enter the home  The patient will not have 24 hour supervision/physical assistance available upon discharge  Patient's daughter is available to assist as needed  PREVIOUS DME:  Equipment in home (previous DME): Grab Bars, Rolling Walker and Walk-in Shower      FUNCTIONAL STATUS:  Physical Therapy Occupational Therapy Speech Therapy   10/18/19    Bed Mobility   Supine to Sit Unable to assess  (pt found resting in chair)   Sit to Supine Unable to assess  (pt left resting in chair as requested)   Transfers   Sit to Stand 3  Moderate assistance   Stand to Sit 3  Moderate assistance   Stand pivot 3  Moderate assistance   Ambulation/Elevation   Gait pattern Excessively slow;Decreased foot clearance; Redundant gait at times; Shuffling   Gait Assistance 4  Minimal assist   Additional items Assist x 1   Assistive Device Rolling walker   Distance 30   Balance   Static Sitting Fair +   Dynamic Standing Poor   Ambulatory Fair -   Endurance Deficit   Endurance Deficit Yes   Endurance Deficit Description SOB, increased WOB   Activity Tolerance   Activity Tolerance Patient limited by fatigue   Nurse Made Aware yes   Assessment   Prognosis Fair   Problem List Decreased range of motion;Decreased strength;Decreased endurance; Impaired balance;Decreased mobility; Decreased safety awareness;Pain   Assessment Pt very motivated to complete therapy  REquired increased time to complete transfers with deficits in strength and balance  Ambulated with severely slow gait  Decreased foot clearance  Required frequent instruction during ambulation for RW distance and positioning  Pt will benefit from continued inpt skilled PT to maximize functional mobility & safety  10/18/19      ADL   Where Assessed Chair   Grooming Assistance 5  Supervision/Setup   UB Bathing Assistance 3  Moderate Assistance   UB Bathing Comments asst for skin folds and proper cleaning and drying     LB Bathing Assistance 3  Moderate Assistance   UB Dressing Assistance 4  Minimal Assistance   LB Dressing Assistance 3  Moderate Assistance   Toileting Assistance  1  Total Assistance   Toileting Comments incontinent of urine, and slightly of bowel, ta for loreto anal care   Functional Standing Tolerance   Time p + balance for toileting   Bed Mobility   Supine to Sit 3  Moderate assistance   Additional items    (hob elevated, pt sleeps on an elaborate lift chair)   Transfers   Sit to Stand 3  Moderate assistance   Stand to Sit 3  Moderate assistance   Stand pivot 3  Moderate assistance   Functional Mobility   Functional Mobility 3  Moderate assistance   Additional items Rolling walker   Cognition   Arousal/Participation Cooperative   Attention Within functional limits   Memory Decreased recall of precautions;Decreased recall of recent events   Following Commands Follows one step commands with increased time or repetition   Activity Tolerance   Activity Tolerance Patient limited by fatigue  (+sob)   Assessment   Assessment pt participated in am ot session and was seen focusing on adls and toileting / functional transfers, bed  mobility  pt was recently transfered to from icu with respritory issues she is not back on pphp6 , discussion with otr/l and plan to resume goals from ie   pt continues to require in pt rehab  she ermains on 3 l o2 with + sob  pulse oxy and pulse remain wfl  pt does requrie mod asst for sit to stand and trnasfers with rw as well as incrased time allowed  pt requires ta for incont management and for loreto anal care and clothing management  pt with poor plus standing balance and requires overall mod asst for lb adl's  pt reports she wearrs deppends at home and uses lhae for dressing  N/A     CURRENT GAP IN FUNCTION     Prior to Admission:     Functional Status: Patient was not independent with mobility/ambulation, transfers, ADL's, IADL's  Estimated length of stay: 10 to 14 days    Anticipated Post-Discharge Disposition/Treatment  Disposition: Return to previous home/apartment    Outpatient Services: Physical Therapy (PT) and Occupational Therapy (OT)    BARRIERS TO DISCHARGE  Weakness, Pain, Fatigue, Home Accessibility, Caregiver Accessibility, Financial Resources, Equipment Needs and Resource Availability    INTERVENTIONS FOR DISCHARGE  Adaptive equipment, Patient/Family/Caregiver Education, [employer], Support Group, Financial Assistance, Arrange DME needs, Medication Changes as per MD recommendations, Therapy exercises and Center of balance support     REQUIRED THERAPY:  Patient will require PT and OT 90 minutes each per day, five days per week to achieve rehab goals  REQUIRED FUNCTIONAL AND MEDICAL MANAGEMENT FOR INPATIENT REHABILITATION:  Skin:  There are no pressure sores currently, buttock erythema/excoriation BEV, skin tear sacrum with foam dressing, Pain Management: Overall pain is well controlled, Deep Vein Thrombosis (DVT) Prophylaxis:  heparin and SCD's while in bed, Diabetes Management: continue sliding scale insulin, patient to do finger sticks as ordered, SLIM to continue to manage diabetes, and further IM management of additional medical conditions while on the ARC, she needs PT/OT intervention, patient/family education and training, possible Neuropsych and Neurosurgery consults with any other needed consults prn, nursing medication review and management of bowel/bladder function  RECOMMENDED LEVEL OF CARE:  Patient presented to [facility] for evaluation s/p fall at home  Per patient's daughter, patient sustained the fall due to leg weakness when trying to go up the stairs  Patient denies hitting her head or loss of consciousness  Patient was recently hospitalized at [address] [place] [address] (10/8 - 10/10) following a fall while there for an outpatient visit  Imaging revealed a traumatic subdural hematoma  Patient was on Plavix, which was reversed with DDAVP  Patient was evaluated by Neurosurgery, who recommended conservative management of SDH, and patient was discharged home with family support  On current presentation, patient's daughter states that patient has been more weak since at home, causing fall leading to presentation  Lab work was notable for hyponatremia and HERLINDA, with Lasix placed on hold  CT of the head showed right parafalcine extra-axial hemorrhage, stable; no new hemorrhage; chronic small vessel ischemic changes   Patient was continued on Keppra BID for seizure prophylaxis, with seizure precautions in place  Patient was noted with mild dyspnea, and was given MDI and small dose IV Lasix x2, with noted improvement  CXR showed cardiomegaly and mild pulmonary vascular congestion  ECHO completed in August of 2019 showed an EF of 55%, moderate hypokinesis of mid anteroseptal wall, grade 2 diastolic dysfunction; LA mildy dilated; mild to moderate MV annular calcification and mild regurgitation  Patient is currently satting in upper 90's on RA, with no signs or symptoms of respiratory distress  Patient is currently on a Cardiac, 2gm Na diet, and is tolerating well with aspiration precautions in place  Prior to patient's admission, patient was fully Independent with ADL's and IADL's, excluding driving, and was Modified Independent with RW for gait and transfers  Currently the patient requires min-mod assist with UB ADLS and mod assist with LB ADLS  In addition, she also requires mod assist with transfers and min assist with ambulation  With the use of a rolling walker the patient was able to ambulate 30 feet  Close medical management and PM&R management is recommended at this time while patient is on the [facility]  Inpatient acute rehab is recommended for patient to maximize overall strength and mobility to Modified Independent upon discharge to home with support of family

## 2019-10-19 NOTE — DISCHARGE SUMMARY
Discharge- Chadwick Flood 1934, 80 y o  female MRN: [id number]    Unit/Bed#: [facility] 609-01 Encounter: [id number]    Primary Care Provider: Hammad Spencer DO   Date and time admitted to hospital: 10/12/2019 11:07 AM        Gait instability  Assessment & Plan  - Gait instability with recent fall   - Continue PT and OT evaluation and treatment as indicated  - Fall precautions  Weakness  Assessment & Plan  - Weakness likely secondary to deconditioning and medical comorbidities  - Continue PT and OT evaluation and treatment as indicated  Acute-on-chronic kidney injury [facility])  Assessment & Plan  - Acute kidney injury superimposed on chronic kidney disease improved during this admission   - Continue current medication regimen on discharge  - Short-term outpatient follow-up with primary care provider  Chronic diastolic heart failure (HCC)  Assessment & Plan  Wt Readings from Last 3 Encounters:   10/20/19 87 kg (191 lb 12 8 oz)   10/17/19 86 2 kg (190 lb 0 6 oz)   10/10/19 84 3 kg (185 lb 14 4 oz)     - Continue current medication regimen on discharge  - Continue to closely monitor daily weights  - Short-term outpatient follow-up with primary cardiology service  SDH (subdural hematoma) ([facility])  Assessment & Plan  - Small traumatic subdural hematoma, stable on repeat imaging this admission without neurologic deficit   - Outpatient follow-up with Neurosurgery in approximately 2 weeks with repeat CT scan of the head prior to follow-up  Discharge Summary - Trauma Service   Chadwick Flood 80 y o  female MRN: [id number]  Unit/Bed#: [facility] 336-10 Encounter: [id number]    Admission Date: 10/12/2019     Discharge Date:  10/19/2019    Admitting Diagnosis: Injury [T14 [id number]]    Discharge Diagnosis:  See above  Attending and Service: Dr Quin Mason, Acute Care Surgical Services  Consulting Physician(s): None      Imaging and Procedures Performed:   Orders Placed This Encounter   Procedures   [facility] CT scan of the head on 10/12/2019 showed a stable right parafalcine extra-axial hemorrhage  No new extra-axial hemorrhage  No mass effect or midline shift  Chronic small vessel ischemic changes  Chest x-ray on 10/15/2019 showed cardiomegaly and mild pulmonary vascular congestion  Chest x-ray on 10/16/2019 showed stable cardiomegaly with mild pulmonary vascular congestion  Chest x-ray on 10/17/2019 showed persistent cardiomegaly and central vascular congestion with small bilateral pleural effusions  Persistent patchy airspace densities in the right mid and lower lung  Right basilar pneumonia not excluded  Hospital Course: Chadwick Flood is a 80-year-old female who presented to the emergency department complaining of weakness  She had recently been admitted to the trauma service following a fall with a traumatic subdural hematoma  Prior to her current presentation, 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 and felt unsteady on her feet as well as week while getting out of the bathroom  She was helped to the ground, but did not suffer recurrent fall  She was brought to the emergency department for recurrent evaluation  On her initial evaluation by the trauma service admission, her primary survey was unremarkable  On secondary survey she was afebrile with normal vital signs; she had no significant findings on secondary survey  Her initial workup included the above-noted CT scan of the head and labs  She was admitted to the trauma service with weakness and gait instability following a recent fall with traumatic brain injury as well as a mildly persistently elevated creatinine following a recent HERLINDA noted on her prior admission  There was no evidence for worsening or new traumatic brain injury  Patient was allowed a diet as tolerated, continued on her home medication regimen where appropriate, and PT and OT were consulted    She was evaluated by the therapy services and was recommended short-term rehab  Following admission, her acute kidney injury did resolve during her hospitalization  Due to interruption and her diuretic therapy as part of the management of her acute kidney injury, she did have an acute exacerbation of her chronic CHF resulting in an admission to the intensive care unit secondary to respiratory decompensation  Once her medical comorbidities were under control and she was deemed appropriate for transfer out of the intensive care unit, disposition planning was continued  She was finally able to be discharged to a post acute care facility with the assistance of case management on 10/19/2019  On discharge, the patient is instructed to follow-up with the patient's primary care provider to review the events of the patient's recent hospitalization  The patient is instructed to follow-up in the Trauma Clinic as needed  The patient is instructed to follow up with [address] [place] [address] Neurosurgery in 2 weeks for re-evaluation of her traumatic brain injury with a repeat CT scan of the head 2-3 days prior to the appointment  The patient is instructed to follow up with her cardiologist Dr Paula Olmstead to review the events of her recent hospitalizations and discuss her diuretic regimen  The patient should follow the provided discharge instructions  Condition at Discharge: good     Discharge instructions/Information to patient and family:   See after visit summary for information provided to patient and family  Provisions for Follow-Up Care:  See after visit summary for information related to follow-up care and any pertinent home health orders  Disposition: See After Visit Summary for discharge disposition information  Planned Readmission: No    Discharge Statement   I spent 24minutes discharging the patient  This time was spent on the day of discharge  I had direct contact with the patient on the day of discharge   Additional documentation is required if more than 30 minutes were spent on discharge  Discharge Medications:  See after visit summary for reconciled discharge medications provided to patient and family        Deneen Marie PA-C  10/19/2019  09:38 AM

## 2019-10-19 NOTE — DISCHARGE INSTRUCTIONS
Neurosurgery discharge instructions following traumatic head bleed:      Do not take any blood thinning medications (ie  No Advil  No motrin  No ibuprofen  No Aleve  No Aspirin  No fishoil  No heparin  No antiplatelet / no anticoagulation medication)  Do not take Plavix until 2 week appointment   Refrain from activity that increases chance of trauma to head or falls  Recommend you take fall precaution   No strenuous activity or sports   Return to hospital Emergency Room if you experience worsening / new headache, nausea/vomiting, speech/vision change, seizure, confusion / mental status change, weakness, or other neurological changes  Follow-up as scheduled with a repeat CT head without contrast to be completed 2-3 days prior to visit  Prescription has been entered electronically  Please call  to schedule  Continue PT and OT evaluation and treatment as indicated

## 2019-10-19 NOTE — DISCHARGE INSTRUCTIONS
Neurosurgery discharge instructions following traumatic head bleed:      Do not take any blood thinning medications (ie  No Advil  No motrin  No ibuprofen  No Aleve  No Aspirin  No fishoil  No heparin  No antiplatelet / no anticoagulation medication)  Do not take Plavix until 2 week appointment   Refrain from activity that increases chance of trauma to head or falls  Recommend you take fall precaution   No strenuous activity or sports   Return to hospital Emergency Room if you experience worsening / new headache, nausea/vomiting, speech/vision change, seizure, confusion / mental status change, weakness, or other neurological changes  Follow-up as scheduled with a repeat CT head without contrast to be completed 2-3 days prior to visit  Prescription has been entered electronically  Please call  to schedule  Trauma Discharge Instructions:    Please follow-up as instructed  If you need a follow-up appointment, please call the office when you leave to schedule an appointment  Activity:  - Continue PT and OT evaluation and treatment as indicated  - You may resume activity as tolerated with assistance  - Walking and normal light activities are encouraged  - Normal daily activities including climbing steps are okay  Diet:    - You may resume your normal diet  Medications:  - You may resume all of your regular medications, except for Plavix, Torsemide, Plaquneil and Potassium, after going home unless otherwise instructed  Please refer to your discharge medication list for further details  - Please take the pain medications as directed  - You are encouraged to use non-narcotic pain medications first and whenever possible  Reserve the use of narcotic pain medication for moderate to severe pain not controlled by non-narcotic medications   - No driving while taking narcotic pain medications  - You may become constipated, especially if taking pain medications   You may take any over the counter stool softeners or laxatives as needed  Examples: Milk of Magnesia, Colace, Senna  Additional Instructions:  - May shower daily   - If you have any questions or concerns after discharge please call the office   - Please monitor your weight daily and call your Cardiologist with any weight change of more than 3 lb in a 24 hour period   - Please have BMP (and any other labs recommended by her outpatient providers) completed on Friday, 10/11/2019  Results to Dr Josefa Myers (Cardiologist) at phone: [phone] and fax:  [phone], Dr Taryn Chicas at phone: [phone] and fax: [phone], and the Trauma Clinic at phone: [phone] and fax: [phone]

## 2019-10-20 PROBLEM — R53.1 WEAKNESS: Status: ACTIVE | Noted: 2019-10-20

## 2019-10-20 PROBLEM — R26.81 GAIT INSTABILITY: Status: ACTIVE | Noted: 2019-10-20

## 2019-10-20 LAB
FERRITIN SERPL-MCNC: 196 NG/ML (ref 8–388)
FOLATE SERPL-MCNC: >20 NG/ML (ref 3.1–17.5)
IRON SATN MFR SERPL: 17 %
IRON SERPL-MCNC: 45 UG/DL (ref 50–170)
TIBC SERPL-MCNC: 267 UG/DL (ref 250–450)

## 2019-10-20 PROCEDURE — 97535 SELF CARE MNGMENT TRAINING: CPT

## 2019-10-20 PROCEDURE — 94640 AIRWAY INHALATION TREATMENT: CPT

## 2019-10-20 PROCEDURE — 97162 PT EVAL MOD COMPLEX 30 MIN: CPT

## 2019-10-20 PROCEDURE — 94660 CPAP INITIATION&MGMT: CPT

## 2019-10-20 PROCEDURE — 97110 THERAPEUTIC EXERCISES: CPT

## 2019-10-20 PROCEDURE — 99223 1ST HOSP IP/OBS HIGH 75: CPT | Performed by: NURSE PRACTITIONER

## 2019-10-20 PROCEDURE — 94760 N-INVAS EAR/PLS OXIMETRY 1: CPT

## 2019-10-20 PROCEDURE — 97167 OT EVAL HIGH COMPLEX 60 MIN: CPT

## 2019-10-20 PROCEDURE — 97116 GAIT TRAINING THERAPY: CPT

## 2019-10-20 PROCEDURE — 97530 THERAPEUTIC ACTIVITIES: CPT

## 2019-10-20 RX ORDER — LIDOCAINE 50 MG/G
1 PATCH TOPICAL DAILY PRN
Status: DISCONTINUED | OUTPATIENT
Start: 2019-10-20 | End: 2019-10-21 | Stop reason: HOSPADM

## 2019-10-20 RX ORDER — LIDOCAINE 50 MG/G
1 PATCH TOPICAL DAILY PRN
Status: DISCONTINUED | OUTPATIENT
Start: 2019-10-20 | End: 2019-10-20

## 2019-10-20 RX ORDER — ALBUTEROL SULFATE 90 UG/1
2 AEROSOL, METERED RESPIRATORY (INHALATION) EVERY 4 HOURS PRN
Status: DISCONTINUED | OUTPATIENT
Start: 2019-10-20 | End: 2019-10-21 | Stop reason: HOSPADM

## 2019-10-20 RX ADMIN — GABAPENTIN 300 MG: 300 CAPSULE ORAL at 16:20

## 2019-10-20 RX ADMIN — METHOCARBAMOL 500 MG: 500 TABLET ORAL at 08:33

## 2019-10-20 RX ADMIN — GABAPENTIN 300 MG: 300 CAPSULE ORAL at 20:45

## 2019-10-20 RX ADMIN — TORSEMIDE 40 MG: 20 TABLET ORAL at 08:33

## 2019-10-20 RX ADMIN — LEVOTHYROXINE SODIUM 112 MCG: 112 TABLET ORAL at 06:25

## 2019-10-20 RX ADMIN — NYSTATIN: 100000 POWDER TOPICAL at 16:20

## 2019-10-20 RX ADMIN — SERTRALINE HYDROCHLORIDE 25 MG: 50 TABLET, FILM COATED ORAL at 21:06

## 2019-10-20 RX ADMIN — LEVALBUTEROL 1.25 MG: 1.25 SOLUTION, CONCENTRATE RESPIRATORY (INHALATION) at 07:36

## 2019-10-20 RX ADMIN — IPRATROPIUM BROMIDE 0.5 MG: 0.5 SOLUTION RESPIRATORY (INHALATION) at 14:36

## 2019-10-20 RX ADMIN — AMLODIPINE BESYLATE 5 MG: 5 TABLET ORAL at 08:33

## 2019-10-20 RX ADMIN — ALBUTEROL SULFATE 2 PUFF: 90 AEROSOL, METERED RESPIRATORY (INHALATION) at 18:51

## 2019-10-20 RX ADMIN — METHOCARBAMOL 500 MG: 500 TABLET ORAL at 18:52

## 2019-10-20 RX ADMIN — METOPROLOL TARTRATE 25 MG: 25 TABLET ORAL at 08:33

## 2019-10-20 RX ADMIN — METOPROLOL TARTRATE 25 MG: 25 TABLET ORAL at 20:45

## 2019-10-20 RX ADMIN — IPRATROPIUM BROMIDE 0.5 MG: 0.5 SOLUTION RESPIRATORY (INHALATION) at 07:36

## 2019-10-20 RX ADMIN — LEVALBUTEROL 1.25 MG: 1.25 SOLUTION, CONCENTRATE RESPIRATORY (INHALATION) at 14:36

## 2019-10-20 RX ADMIN — ACETAMINOPHEN 650 MG: 325 TABLET ORAL at 20:46

## 2019-10-20 RX ADMIN — ACETAMINOPHEN 650 MG: 325 TABLET ORAL at 14:13

## 2019-10-20 RX ADMIN — GABAPENTIN 300 MG: 300 CAPSULE ORAL at 08:33

## 2019-10-20 RX ADMIN — ACETAMINOPHEN 650 MG: 325 TABLET ORAL at 04:41

## 2019-10-20 RX ADMIN — NYSTATIN: 100000 POWDER TOPICAL at 08:35

## 2019-10-20 NOTE — ASSESSMENT & PLAN NOTE
- right sdh  - per last neurosx note of 10/9 hold all blood thinners for 2 wks and then FU CT at 2 wks time to determine if blood thinners can be resumed   - was on HSQ in acute care for DVT ppx however will use SCDs only until verbal clearance from neurosx which, will need to obtain clearance from neurosurgery   - completed 14 doses of keppra for sz ppx, need to confirm with neurosurgery if patient needs to continue on keppra

## 2019-10-20 NOTE — ASSESSMENT & PLAN NOTE
· Most recent EF: 55%, with moderate hypokinesis of mid anteroseptal walls noted  Patient with grade 2 diastolic dysfunction    · 10/17 CXR shows vascular congestion and small b/l pleural effusions  · Repeat CXR performed today, shows new small effusions and possible airspace consolidation in LL base  · BNP at [address]  · Patient refused torsemide as patient feels it was giving her loose stools  · Renal has switched her to lasix  · Holding therapy today due to SOB

## 2019-10-20 NOTE — ASSESSMENT & PLAN NOTE
Patient currently not on statin  Recommend healthy lifestyle choices for your cholesterol  Low fat/low cholesterol diet  Limit/avoid red meat  Eat more lean meat - chicken breast, ground turkey, fish  Exercise 30 mins at least 5 times a week as tolerated

## 2019-10-20 NOTE — ASSESSMENT & PLAN NOTE
- home plavix on hold due to extra-axial hemorrhage, per neurosx consult of 10/9 to be held for 2 wks until FU CT at which time neurosx will decide if plavix may be resumed  - intolerant to statins per EMR

## 2019-10-20 NOTE — H&P
PHYSICAL MEDICINE AND REHABILITATION H&P/ADMISSION NOTE  Agata Lu 80 y o  female MRN: [id number]  Unit/Bed#: [facility] 270-01 Encounter: [id number]     Rehab Diagnosis: SDH    History of Present Illness:   Agata Lu is a 80 y o  female who presented to the [address] with weakness after SDH which was managed non-operatively, stable per most recent [facility]  Noted to have acute on chronic renal failure for which renal has been consulted  Subjective: patient without complaint currently     Review of Systems: A 10-point review of systems was performed  Negative except as listed above      Plan:     SDH (subdural hematoma) (HCC)  Assessment & Plan  - right sdh  - per last neurosx note of 10/9 hold all blood thinners for 2 wks and then FU CTH at 2 wks time to determine if blood thinners can be resumed   - was on HSQ in acute care for DVT ppx however will use SCDs only until verbal clearance from neurosx which will defer to primary inpatient rehab attending to obtain clearance from neurosx service   - completed 14 doses of keppra for sz ppx, recommend primary inpt rehab attending confirm with neurosx service that no further doses are required      Anemia  Assessment & Plan  - per EMR present going back to 01/2019  - currently 8 3, recheck on 10/21  -IM monitoring     Acute-on-chronic kidney injury [facility])  Assessment & Plan  - baseline appears to be 1 0-1 2 however has not been below 1 5 since August   - currently 1 53 and this may represent new baseline however patient is on torsemide  - recheck on 10/21  - nephrology consulted     AZUL (obstructive sleep apnea)  Assessment & Plan  - non-compliant at home and has not used CPAP for 10 to 15 years now    S/P AVR  Assessment & Plan  - bioprosthetic valve   - nl fxn per last echo in EMR     Depression  Assessment & Plan  - zoloft     Chronic diastolic heart failure ([facility] [address] )  Assessment & Plan  - grade 2   - on chest XR of 10/17 vascular congestion and small b/l pleural effusions  - on torsemide  - volume currently stable   - IM managing     Essential hypertension  Assessment & Plan  - torsemide  - norvasc  - IM managing     Abnormal CT scan, cervical spine  Assessment & Plan  - on CT c-spine of 10/8 report noted stable C6 anterior osteophyte fx and 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  - evaluated on 10/9 by neurosx and no intervention/further BENNETT recommended  - will defer further d/w neurosx service to primary inpatient rehab attending      H/O: CVA (cerebrovascular accident)  Assessment & Plan  - home plavix on hold due to extra-axial hemorrhage, per neurosx consult of 10/9 to be held for 2 wks until [facility] at which time neurosx will decide based on FU CTH if plavix may be resumed  - intolerant to statins per EMR     Hypothyroidism  Assessment & Plan  - levothyroxine        Drug regimen reviewed, all potential adverse effects identified and addressed:    Scheduled Meds:  Current Facility-Administered Medications:  acetaminophen 650 mg Oral Q6H PRN MD Shannan Rios ON 10/20/2019] amLODIPine 5 mg Oral QAM Chris Torres MD   gabapentin 300 mg Oral TID Chris Torres MD   ipratropium 0 5 mg Nebulization TID Chris Torres MD   levalbuterol 1 25 mg Nebulization TID MD Shannan Rios ON 10/20/2019] levothyroxine 112 mcg Oral Daily Chris Torres MD   methocarbamol 500 mg Oral Q8H PRN Chris Torres MD   metoprolol tartrate 25 mg Oral Q12H Harish Em MD   nystatin  Topical BID Chris Torres MD   polyethylene glycol 17 g Oral Daily PRN Chris Torres MD   sertraline 25 mg Oral HS MD Shannan Rios ON 10/20/2019] torsemide 40 mg Oral Daily Chris Torres MD        Incidental findings:    1) incomplete RBBB: OP FU with PCP with further testing/treatment and/or specialist referral at PCP's discretion     2) elevated peak PA pressure: OP FU with PCP with further testing/treatment and/or specialist referral at PCP's discretion     DVT ppx: SCDs only until cleared by neurosx             Functional History - Prior to Admission:      Mod I PTA     Functional Status Upon Admission to [facility]:  Mobility: min 30 feet   Transfers: mod  ADLs: mod-max    Winnie Stagefelix lives with their daughter  She lives in [facility]) single family home  The living area: can live on one level  There 3 steps to enter the home      The patient will not have 24 hour supervision available upon discharge      Physical Exam:  T: 97 7  HR: 66  BP: 120/58  RR: 16  POx: 98% (RA)       General: alert, no apparent distress, cooperative and comfortable  CARDIAC:  +S1/2  LUNGS:  respirations unlabored   ABDOMEN:  soft NT   EXTREMITIES:  volume status currenlty stable   NEURO:   cranial nerves 2-12 intact, sensation grossly normal, finger to nose and cerebellar exam normal and 4-/5 UE B/L, 4-/5 dorsiflexion/plantar flexion B/L, 1-2/5 hip flexion B/L  PSYCH:  mood/affect currently stable       Laboratory:    Results from last 7 days   Lab Units 10/18/19  0513 10/17/19  0443 10/16/19  0502   HEMOGLOBIN g/dL 8 3* 8 7* 9 3*   HEMATOCRIT % 26 9* 27 3* 29 9*   WBC Thousand/uL 6 25 6 06 5 68     Results from last 7 days   Lab Units 10/18/19  0513 10/17/19  0443 10/16/19  0502   BUN mg/dL 37* 33* 33*   SODIUM mmol/L 138 139 135*   POTASSIUM mmol/L 4 1 4 5 4 0   CHLORIDE mmol/L 103 103 100   CREATININE mg/dL 1 53* 1 50* 1 41*                Past Medical History:   Past Surgical History:   Family History:   Social history:   Past Medical History:   Diagnosis Date    Arthritis     Breast cancer ([facility] [address] ) 2015    Cardiac disease     aortic valve transplant    [facility]congestive heart failure) ([facility])     Compression fracture of body of thoracic vertebra (HCC)     CVA (cerebral vascular accident) ([facility] [address] )     Diabetes mellitus ([facility] [address] )     Disease of thyroid gland     Fibromyalgia, primary     H/O cervical fracture 01/09/2019    Hyperlipidemia     Hypertension     Neuropathy     AZUL (obstructive sleep apnea) 10/19/2019    Pressure injury of skin     Renal disorder     kidney stent    Stroke [facility])     Past Surgical History:   Procedure Laterality Date    AORTIC VALVE SURGERY      BREAST LUMPECTOMY Right     BREAST LUMPECTOMY Left     BREAST SURGERY      lumpectomy for breast cancer    CATARACT EXTRACTION      CHOLECYSTECTOMY      HYSTERECTOMY  1978    KIDNEY SURGERY      stent placed for kidney    OTHER SURGICAL HISTORY      abdominal aneurysm surgery    IL ARTHRODESIS POSTERIOR ATLAS-AXIS C1-C2 N/A 5/1/2019    Procedure: C1-C2 lateral mass fixation fusion with possible sublaminar cables;  Surgeon: Maria E Laguna MD;  [facility];  Service: Neurosurgery    REPLACEMENT TOTAL KNEE      left      Family History   Problem Relation Age of Onset    Heart disease Mother     Arthritis Mother     Diabetes Mother     Heart failure Father     Arthritis Father     Other Father         enlargement of prostate     Dementia Father     No Known Problems Daughter     No Known Problems Maternal Grandmother     No Known Problems Maternal Grandfather     No Known Problems Paternal Grandmother     No Known Problems Paternal Grandfather     No Known Problems Maternal Aunt     No Known Problems Maternal Aunt     No Known Problems Maternal Aunt     No Known Problems Maternal Aunt     No Known Problems Paternal Aunt     No Known Problems Paternal Aunt       Social History     Socioeconomic History    Marital status:       Spouse name: None    Number of children: 3    Years of education: None    Highest education level: None   Occupational History    None   Social Needs    Financial resource strain: None    Food insecurity:     Worry: None     Inability: None    Transportation needs:     Medical: None     Non-medical: None   Tobacco Use    Smoking status: Never Smoker    Smokeless tobacco: Never Used   Substance and Sexual Activity    Alcohol use: Never     Frequency: Never     Binge frequency: Never    Drug use: No    Sexual activity: Not Currently   Lifestyle    Physical activity:     Days per week: None     Minutes per session: None    Stress: None   Relationships    Social connections:     Talks on phone: None     Gets together: None     Attends Christianity service: None     Active member of club or organization: None     Attends meetings of clubs or organizations: None     Relationship status: None    Intimate partner violence:     Fear of current or ex partner: None     Emotionally abused: None     Physically abused: None     Forced sexual activity: None   Other Topics Concern    None   Social History Narrative    ** Merged History Encounter **               Current Medical Diagnosis Allergies   Patient Active Problem List   Diagnosis    Hypothyroidism    H/O: CVA (cerebrovascular accident)    Abnormal CT scan, cervical spine    Essential hypertension    Chronic diastolic heart failure (HCC)    Depression    S/P AVR    Acute-on-chronic kidney injury ([facility] [address] )    Anemia    SDH (subdural hematoma) (HCC)    AZUL (obstructive sleep apnea)    Allergies   Allergen Reactions    Duloxetine Hcl Other (See Comments) and Hypertension    Duloxetine Hcl      Cymbalta;  Hemorrhagic stroke listed as reaction    Escitalopram      Other reaction(s): Urinary Retention    Pregabalin      Other reaction(s): Hypertension    Statins Myalgia    Tramadol      Other reaction(s): Hypertension    Triprolidine-Pse Other (See Comments)    Anastrozole Abdominal Pain    Anastrozole     Antihistamines, Diphenhydramine-Type     Exemestane      Aromasin; Muscle pain & cramps    Lexapro [Escitalopram]      Urinary retention    Lyrica [Pregabalin]      hypertension    Metformin      diarrhea    Oxycodone      Pt unsure      Statins      Muscle pain & cramps    Tramadol      hypertension    Triprolidine-Pseudoephedrine     Metformin Diarrhea           Medical Necessity Criteria for [facility] Admission: SDH, HTN  In addition, the preadmission screen, post-admission physical evaluation, overall plan of care and admissions order demonstrate a reasonable expectation that the following criteria were met at the time of admission to the [facility]  1  The patient requires active and ongoing therapeutic intervention of multiple therapy disciplines (physical therapy, occupational therapy, speech-language pathology, or prosthetics/orthotics), one of which is physical or occupational therapy  2  Patient requires an intensive rehabilitation therapy program, as defined in Chapter 1, section 110 2 2 of the CMS Medicare Policy Manual  This intensive rehabilitation therapy program will consist of at least 3 hours of therapy per day at least 5 days per week or at least 15 hours of intensive rehabilitation therapy within a 7 consecutive day period, beginning with the date of admission to the [facility]  3  The patient is reasonably expected to actively participate in, and benefit significantly from, the intensive rehabilitation therapy program as defined in Chapter 1, section 110 2 2 of the CMS Medicare Policy Manual at this time of admission to the [facility]  She can reasonably be expected to make measurable improvement (that will be of practical value to improve the patients functional capacity or adaptation to impairments) as a result of the rehabilitation treatment, as defined in section 110 3, and such improvement can be expected to be made within the prescribed period of time  As noted in the CMS Medicare Policy Manual, the patient need not be expected to achieve complete independence in the domain of self-care nor be expected to return to his or her prior level of functioning in order to meet this standard  4  The patient must require physician supervision by a rehabilitation physician   As such, a rehabilitation physician will conduct face-to-face visits with the patient at least 3 days per week throughout the patients stay in the [facility] to assess the patient both medically and functionally, as well as to modify the course of treatment as needed to maximize the patients capacity to benefit from the rehabilitation process  5  The patient requires an intensive and coordinated interdisciplinary approach to providing rehabilitation, as defined in Chapter 1, section 110 2 5 of the CMS Medicare Policy Manual  This will be achieved through periodic team conferences, conducted at least once in a 7-day period, and comprising of an interdisciplinary team of medical professionals consisting of: a rehabilitation physician, registered nurse,  and/or , and a licensed/certified therapist from each therapy discipline involved in treating the patient  Changes Since Pre-admission Assessment: None -This patient's participation in rehab continues to be reasonable, necessary and appropriate  CMS Required Post-Admission Physician Evaluation Elements  History and Physical, including medical history, functional history and active comorbidities as in above text      PostAdmission Physician Evaluation:  The patient has the potential to make improvement and is in need of physical, occupational, and/or therapy services  The patient may also need nutritional services  Given the patient's complex medical condition and risk of further medical complications, rehabilitative services cannot be safely provided at a lower level of care, such as a skilled nursing facility  I have reviewed the patient's functional and medical status at the time of the preadmission screening and they are the same as on the day of this admission  I acknowledge that I have personally performed a full physical examination on this patient within 24 hours of admission   The patient and/or family demonstrated understanding the rehabilitation program and the discharge process after we discussed them      Agree in entirety: yes  Minor adaptions: none    Major changes: none     Catherine Reese MD  Physical Medicine and Rehabilitation

## 2019-10-20 NOTE — ASSESSMENT & PLAN NOTE
- per EMR present going back to 01/2019  - currently 8 3, recheck on 10/21  - patient reports iron deficiency anemia, will get iron panel, folate, B12 and supplement as neccessary

## 2019-10-20 NOTE — ASSESSMENT & PLAN NOTE
- baseline appears to be 1 0-1 2 however has not been below 1 5 since August   - currently 1 53 and this may represent new baseline however patient is on torsemide  - recheck on 10/21  - nephrology consulted  by physiatry recommendations as stated below:  ? 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 Continued torsemide 40 mg daily and monitor- patient appears euvolemic  ? Check renal ultrasound for completeness  ?  Check vitamin-D level

## 2019-10-20 NOTE — CONSULTS
[address] [facility] 80 y o  female MRN: [id number]  Unit/Bed#: -01 Encounter: [id number]    ASSESSMENT and PLAN:  1  Chronic kidney disease, stage III:    · Patient previously seen by Dr Elena Gannon in 2015  During last visit it was noted that creatinine was up to 1 7 with a baseline closer to 1 2  Dr Elena Gannon felt that it was related to volume depletion  Following that visit creatinine improved and remained near 1-1 2  · Earlier this year baseline appears to be between 1 2-1 3     · In August creatinine elevated to 1 5 with no follow-up labs until October 8th at that time creatinine up to 2 6  This was during admission for acute subdural hematoma  Patient was not seen by Nephrology at that time  Eventually creatinine declined and plateaued in the mid 1s  · CKD etiology likely hypertensive nephrosclerosis, age-related nephron loss  Recent acute kidney injury possibly contributing [place] urinalysis with no history of proteinuria  · Urinalysis on 10/09/2019:  Negative for protein, no RBCs  · Creatinine currently in the mid 1s  No recent change in medications or diuretic dose  Patient relates that when she came in the hospital earlier this month she had severe volume overload and congestive heart failure which has resolved  · Plan:    · Continue to monitor at steady state to determine true baseline  As mentioned there may still be some affect related to recent acute kidney injury and poor renal reserve due to advanced age nephron loss  · Continued torsemide 40 mg daily and monitor- patient appears euvolemic  · Check renal ultrasound for completeness  · Check vitamin-D level  2  Recent acute subdural hematoma, managed medically:  Patient now in acute rehab due to weakness  3  Hypertension:    · History of renal artery stenosis status post stent placement right renal artery  · Blood pressure acceptable  · On calcium channel blocker and beta-blocker  4   Chronic diastolic CHF:    · Currently on torsemide 40 mg daily   · Echocardiogram 08/2019:  EF 40%, grade 2 diastolic dysfunction  Aortic bioprosthetic valve noted with Normal function noted  Mild MR  · Plan:  Continue torsemide 40 mg daily  5  Anemia:  Chronic, macrocytic  Likely related to chronic disease  Hemoglobin trending down, currently 8 3  Recommending limiting lab draws  Check B12, folate,  iron studies    SUMMARY OF RECOMMENDATIONS:  At this time it appears that creatinine has plateaued in the mid 1s which is similar to last value in August   Patient has a history of chronic kidney disease stage 2 to 3 and is likely at baseline  No change in diuretic dose suggested at this time  Continue to monitor  Other suggestions as noted above  HISTORY OF PRESENT ILLNESS:  Requesting Physician: Bela Abdi MD  Reason for Consult:  Chronic kidney disease, ? Acute kidney injury    Steff Ojeda is a 80 y o  female with a history of renal artery stenosis status post stent right renal artery, hyponatremia (volume related/SIADH), CKD stage 2 to 3, hypothyroidism, status post AVR, CVA on Plavix, hyperlipidemia hypertension, CHF, breast cancer who was admitted to acute rehab due to persistent weakness following recent acute subdural hematoma  The patient is an adequate historian and tells me that she was previously seen by Dr Alexander Rodriguez approximately 3 years ago  She had been treated for renal artery stenosis with right renal artery stent  Patient had a recent acute kidney injury when she was admitted with subdural hematoma  Diuretics were placed on hold since creatinine was up to 2 6  Afterwards patient developed acute on chronic CHF and was diuresed  She remains on torsemide 40 mg daily with stable weight absence of symptoms  A renal consultation is requested today for assistance in the management of CKD      PAST MEDICAL HISTORY:  Past Medical History:   Diagnosis Date    Arthritis     Breast cancer ([facility] [address] ) 2015    Cardiac disease     aortic valve transplant    [facility]     Compression fracture of body of thoracic vertebra ([facility])     CVA [facility]cerebral vascular accident) ([facility] [address] )     Diabetes mellitus ([facility] [address] )     Disease of thyroid gland     Fibromyalgia, primary     H/O cervical fracture 01/09/2019    Hyperlipidemia     Hypertension     Neuropathy     AZUL (obstructive sleep apnea) 10/19/2019    Pressure injury of skin     Renal disorder     kidney stent    Stroke [facility])        PAST SURGICAL HISTORY:  Past Surgical History:   Procedure Laterality Date    AORTIC VALVE SURGERY      BREAST LUMPECTOMY Right     BREAST LUMPECTOMY Left     BREAST SURGERY      lumpectomy for breast cancer    CATARACT EXTRACTION      CHOLECYSTECTOMY      HYSTERECTOMY  1978    KIDNEY SURGERY      stent placed for kidney    OTHER SURGICAL HISTORY      abdominal aneurysm surgery    MN ARTHRODESIS POSTERIOR ATLAS-AXIS C1-C2 N/A 5/1/2019    Procedure: C1-C2 lateral mass fixation fusion with possible sublaminar cables;  Surgeon: Marquise Corona MD;  [facility]: [facility];  Service: Neurosurgery    REPLACEMENT TOTAL KNEE      left        ALLERGIES:  Allergies   Allergen Reactions    Duloxetine Hcl Other (See Comments) and Hypertension    Duloxetine Hcl      Cymbalta;  Hemorrhagic stroke listed as reaction    Escitalopram      Other reaction(s): Urinary Retention    Pregabalin      Other reaction(s): Hypertension    Statins Myalgia    Tramadol      Other reaction(s): Hypertension    Triprolidine-Pse Other (See Comments)    Anastrozole Abdominal Pain    Anastrozole     Antihistamines, Diphenhydramine-Type     Exemestane      Aromasin; Muscle pain & cramps    Lexapro [Escitalopram]      Urinary retention    Lyrica [Pregabalin]      hypertension    Metformin      diarrhea    Oxycodone      Pt unsure      Statins      Muscle pain & cramps    Tramadol      hypertension    Triprolidine-Pseudoephedrine     Metformin Diarrhea       SOCIAL HISTORY:  Social History     Substance and Sexual Activity   Alcohol Use Never    Frequency: Never    Binge frequency: Never     Social History     Substance and Sexual Activity   Drug Use No     Social History     Tobacco Use   Smoking Status Never Smoker   Smokeless Tobacco Never Used       FAMILY HISTORY:  Family History   Problem Relation Age of Onset    Heart disease Mother     Arthritis Mother     Diabetes Mother     Heart failure Father     Arthritis Father     Other Father         enlargement of prostate     Dementia Father     No Known Problems Daughter     No Known Problems Maternal Grandmother     No Known Problems Maternal Grandfather     No Known Problems Paternal Grandmother     No Known Problems Paternal Grandfather     No Known Problems Maternal Aunt     No Known Problems Maternal Aunt     No Known Problems Maternal Aunt     No Known Problems Maternal Aunt     No Known Problems Paternal Aunt     No Known Problems Paternal Aunt        MEDICATIONS:    Current Facility-Administered Medications:     acetaminophen (TYLENOL) tablet 650 mg, 650 mg, Oral, Q6H PRN, Renae Ann MD, 650 mg at 10/20/19 0441    amLODIPine (NORVASC) tablet 5 mg, 5 mg, Oral, QAM, Renae Ann MD, 5 mg at 10/20/19 4009    gabapentin (NEURONTIN) capsule 300 mg, 300 mg, Oral, TID, Renae Ann MD, 300 mg at 10/20/19 0833    ipratropium (ATROVENT) 0 02 % inhalation solution 0 5 mg, 0 5 mg, Nebulization, TID, Renae Ann MD, 0 5 mg at 10/20/19 0736    levalbuterol (XOPENEX) inhalation solution 1 25 mg, 1 25 mg, Nebulization, TID, Renae Ann MD, 1 25 mg at 10/20/19 0736    levothyroxine tablet 112 mcg, 112 mcg, Oral, Daily, Renae Ann MD, 112 mcg at 10/20/19 0625    methocarbamol (ROBAXIN) tablet 500 mg, 500 mg, Oral, Q8H PRN, Renae Ann MD, 500 mg at 10/20/19 0833    metoprolol tartrate (LOPRESSOR) tablet 25 mg, 25 mg, Oral, Q12H [address], Carlito Georges MD, 25 mg at 10/20/19 5969    nystatin (MYCOSTATIN) powder, , Topical, BID, Carlito Georges MD    polyethylene glycol (MIRALAX) packet 17 g, 17 g, Oral, Daily PRN, Carlito Georges MD    sertraline (ZOLOFT) tablet 25 mg, 25 mg, Oral, HS, Carlito Georges MD, 25 mg at 10/19/19 2120    torsemide (DEMADEX) tablet 40 mg, 40 mg, Oral, Daily, Carlito Georges MD, 40 mg at 10/20/19 7780    REVIEW OF SYSTEMS:  Patient reports no unusual fatigue at this time  No fevers or chills  No nausea, vomiting, diarrhea  No shortness of breath  She never sleeps flat in the bed  Even at home she sleeps in a chair  She reports no cough, no chest pain  She denies difficulty urinating  All the systems were reviewed and were negative except as documented on the HPI  PHYSICAL EXAM:  Current Weight: Weight - Scale: 87 kg (191 lb 12 8 oz)  First Weight: Weight - Scale: 85 7 kg (188 lb 14 2 oz)  Vitals:    10/20/19 0600 10/20/19 0736 10/20/19 0830 10/20/19 0840   BP:   135/56    BP Location:   Left arm    Pulse:   76    Resp:   18    Temp:   97 7 °F (36 5 °C)    TempSrc:   Tympanic    SpO2:  92% 94%    Weight: 86 6 kg (191 lb)   87 kg (191 lb 12 8 oz)   Height:           Intake/Output Summary (Last 24 hours) at 10/20/2019 1252  Last data filed at 10/20/2019 0849  Gross per 24 hour   Intake 640 ml   Output 300 ml   Net 340 ml     Physical Exam   Constitutional: She is oriented to person, place, and time  She appears well-developed  She appears distressed  Morbidly obese female in no acute distress  HENT:   Head: Normocephalic and atraumatic  Mouth/Throat: Oropharynx is clear and moist    Eyes: Conjunctivae and EOM are normal    Neck: Normal range of motion  Neck supple  No JVD present  No tracheal deviation present  Cardiovascular: Normal rate, regular rhythm and intact distal pulses  Exam reveals no gallop and no friction rub  No murmur heard  Pulmonary/Chest: Effort normal  No stridor  No respiratory distress   She has no wheezes  She has rales in the right lower field  Abdominal: Soft  Bowel sounds are normal  She exhibits no distension and no mass  There is no tenderness  There is no rebound and no guarding  Musculoskeletal: Normal range of motion  She exhibits no edema, tenderness or deformity  Neurological: She is alert and oriented to person, place, and time  Skin: Skin is warm and dry  No rash noted  She is not diaphoretic  No erythema  No pallor  Psychiatric: She has a normal mood and affect   Her behavior is normal  Judgment and thought content normal        Invasive Devices:      Lab Results:   Results from last 7 days   Lab Units 10/18/19  0513 10/17/19  0443 10/16/19  0502 10/15/19  1131   WBC Thousand/uL 6 25 6 06 5 68  --    HEMOGLOBIN g/dL 8 3* 8 7* 9 3*  --    HEMATOCRIT % 26 9* 27 3* 29 9*  --    PLATELETS Thousands/uL 193 186 209 190   POTASSIUM mmol/L 4 1 4 5 4 0 3 7   CHLORIDE mmol/L 103 103 100 100   CO2 mmol/L 28 30 28 27   BUN mg/dL 37* 33* 33* 35*   CREATININE mg/dL 1 53* 1 50* 1 41* 1 52*   CALCIUM mg/dL 8 6 8 6 8 9 8 9   MAGNESIUM mg/dL  --   --  2 4 2 5   PHOSPHORUS mg/dL  --   --  3 2 2 9     Other Studies:

## 2019-10-20 NOTE — ASSESSMENT & PLAN NOTE
Wt Readings from Last 3 Encounters:   10/20/19 87 kg (191 lb 12 8 oz)   10/17/19 86 2 kg (190 lb 0 6 oz)   10/10/19 84 3 kg (185 lb 14 4 oz)     - Continue current medication regimen on discharge  - Continue to closely monitor daily weights  - Short-term outpatient follow-up with primary cardiology service

## 2019-10-20 NOTE — PLAN OF CARE
Problem: Potential for Falls  Goal: Patient will remain free of falls  Description  INTERVENTIONS:  - Assess patient frequently for physical needs  -  Identify cognitive and physical deficits and behaviors that affect risk of falls    -  [place] fall precautions as indicated by assessment   - Educate patient/family on patient safety including physical limitations  - Instruct patient to call for assistance with activity based on assessment  - Modify environment to reduce risk of injury  - Consider OT/PT consult to assist with strengthening/mobility  Outcome: Progressing     Problem: Prexisting or High Potential for Compromised Skin Integrity  Goal: Skin integrity is maintained or improved  Description  INTERVENTIONS:  - Identify patients at risk for skin breakdown  - Assess and monitor skin integrity  - Assess and monitor nutrition and hydration status  - Monitor labs   - Assess for incontinence   - Turn and reposition patient  - Assist with mobility/ambulation  - Relieve pressure over bony prominences  - Avoid friction and shearing  - Provide appropriate hygiene as needed including keeping skin clean and dry  - Evaluate need for skin moisturizer/barrier cream  - Collaborate with interdisciplinary team   - Patient/family teaching  - Consider wound care consult   Outcome: Progressing     Problem: PAIN - ADULT  Goal: Verbalizes/displays adequate comfort level or baseline comfort level  Description  Interventions:  - Encourage patient to monitor pain and request assistance  - Assess pain using appropriate pain scale  - Administer analgesics based on type and severity of pain and evaluate response  - Implement non-pharmacological measures as appropriate and evaluate response  - Consider cultural and social influences on pain and pain management  - Notify physician/advanced practitioner if interventions unsuccessful or patient reports new pain  Outcome: Progressing     Problem: INFECTION - ADULT  Goal: Absence or prevention of progression during hospitalization  Description  INTERVENTIONS:  - Assess and monitor for signs and symptoms of infection  - Monitor lab/diagnostic results  - Monitor all insertion sites, i e  indwelling lines, tubes, and drains  - Monitor endotracheal if appropriate and nasal secretions for changes in amount and color  - [place] appropriate cooling/warming therapies per order  - Administer medications as ordered  - Instruct and encourage patient and family to use good hand hygiene technique  - Identify and instruct in appropriate isolation precautions for identified infection/condition  Outcome: Progressing  Goal: Absence of fever/infection during neutropenic period  Description  INTERVENTIONS:  - Monitor WBC    Outcome: Progressing     Problem: SAFETY ADULT  Goal: Maintain or return to baseline ADL function  Description  INTERVENTIONS:  -  Assess patient's ability to carry out ADLs; assess patient's baseline for ADL function and identify physical deficits which impact ability to perform ADLs (bathing, care of mouth/teeth, toileting, grooming, dressing, etc )  - Assess/evaluate cause of self-care deficits   - Assess range of motion  - Assess patient's mobility; develop plan if impaired  - Assess patient's need for assistive devices and provide as appropriate  - Encourage maximum independence but intervene and supervise when necessary  - Involve family in performance of ADLs  - Assess for home care needs following discharge   - Consider OT consult to assist with ADL evaluation and planning for discharge  - Provide patient education as appropriate  Outcome: Progressing  Goal: Maintain or return mobility status to optimal level  Description  INTERVENTIONS:  - Assess patient's baseline mobility status (ambulation, transfers, stairs, etc )    - Identify cognitive and physical deficits and behaviors that affect mobility  - Identify mobility aids required to assist with transfers and/or ambulation (gait belt, sit-to-stand, lift, walker, cane, etc )  - [place] fall precautions as indicated by assessment  - Record patient progress and toleration of activity level on Mobility SBAR; progress patient to next Phase/Stage  - Instruct patient to call for assistance with activity based on assessment  - Consider rehabilitation consult to assist with strengthening/weightbearing, etc   Outcome: Progressing     Problem: DISCHARGE PLANNING  Goal: Discharge to home or other facility with appropriate resources  Description  INTERVENTIONS:  - Identify barriers to discharge w/patient and caregiver  - Arrange for needed discharge resources and transportation as appropriate  - Identify discharge learning needs (meds, wound care, etc )  - Arrange for interpretive services to assist at discharge as needed  - Refer to Case Management Department for coordinating discharge planning if the patient needs post-hospital services based on physician/advanced practitioner order or complex needs related to functional status, cognitive ability, or social support system  Outcome: Progressing

## 2019-10-20 NOTE — ASSESSMENT & PLAN NOTE
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

## 2019-10-20 NOTE — ASSESSMENT & PLAN NOTE
- Weakness likely secondary to deconditioning and medical comorbidities  - Continue PT and OT evaluation and treatment as indicated

## 2019-10-20 NOTE — ASSESSMENT & PLAN NOTE
- non-compliant at home and has not used CPAP for 10 to 15 years now  Can not find old sleep study, will place order for new sleep study

## 2019-10-20 NOTE — ASSESSMENT & PLAN NOTE
- Continue current medication regimen on discharge  - Continue to closely monitor daily weights  - Short-term outpatient follow-up with primary cardiology service

## 2019-10-20 NOTE — ASSESSMENT & PLAN NOTE
- home plavix on hold due to extra-axial hemorrhage, per neurosx consult of 10/9 to be held for 2 wks until FU [facility] at which time neurosx will decide based on FU [facility] if plavix may be resumed  - intolerant to statins per EMR

## 2019-10-20 NOTE — PLAN OF CARE
Problem: Potential for Falls  Goal: Patient will remain free of falls  Description  INTERVENTIONS:  - Assess patient frequently for physical needs  -  Identify cognitive and physical deficits and behaviors that affect risk of falls    -  [place] fall precautions as indicated by assessment   - Educate patient/family on patient safety including physical limitations  - Instruct patient to call for assistance with activity based on assessment  - Modify environment to reduce risk of injury  - Consider OT/PT consult to assist with strengthening/mobility  Outcome: Progressing     Problem: Prexisting or High Potential for Compromised Skin Integrity  Goal: Skin integrity is maintained or improved  Description  INTERVENTIONS:  - Identify patients at risk for skin breakdown  - Assess and monitor skin integrity  - Assess and monitor nutrition and hydration status  - Monitor labs   - Assess for incontinence   - Turn and reposition patient  - Assist with mobility/ambulation  - Relieve pressure over bony prominences  - Avoid friction and shearing  - Provide appropriate hygiene as needed including keeping skin clean and dry  - Evaluate need for skin moisturizer/barrier cream  - Collaborate with interdisciplinary team   - Patient/family teaching  - Consider wound care consult   Outcome: Progressing     Problem: PAIN - ADULT  Goal: Verbalizes/displays adequate comfort level or baseline comfort level  Description  Interventions:  - Encourage patient to monitor pain and request assistance  - Assess pain using appropriate pain scale  - Administer analgesics based on type and severity of pain and evaluate response  - Implement non-pharmacological measures as appropriate and evaluate response  - Consider cultural and social influences on pain and pain management  - Notify physician/advanced practitioner if interventions unsuccessful or patient reports new pain  Outcome: Progressing     Problem: INFECTION - ADULT  Goal: Absence or prevention of progression during hospitalization  Description  INTERVENTIONS:  - Assess and monitor for signs and symptoms of infection  - Monitor lab/diagnostic results  - Monitor all insertion sites, i e  indwelling lines, tubes, and drains  - Monitor endotracheal if appropriate and nasal secretions for changes in amount and color  - [place] appropriate cooling/warming therapies per order  - Administer medications as ordered  - Instruct and encourage patient and family to use good hand hygiene technique  - Identify and instruct in appropriate isolation precautions for identified infection/condition  Outcome: Progressing  Goal: Absence of fever/infection during neutropenic period  Description  INTERVENTIONS:  - Monitor WBC    Outcome: Progressing     Problem: SAFETY ADULT  Goal: Maintain or return to baseline ADL function  Description  INTERVENTIONS:  -  Assess patient's ability to carry out ADLs; assess patient's baseline for ADL function and identify physical deficits which impact ability to perform ADLs (bathing, care of mouth/teeth, toileting, grooming, dressing, etc )  - Assess/evaluate cause of self-care deficits   - Assess range of motion  - Assess patient's mobility; develop plan if impaired  - Assess patient's need for assistive devices and provide as appropriate  - Encourage maximum independence but intervene and supervise when necessary  - Involve family in performance of ADLs  - Assess for home care needs following discharge   - Consider OT consult to assist with ADL evaluation and planning for discharge  - Provide patient education as appropriate  Outcome: Progressing  Goal: Maintain or return mobility status to optimal level  Description  INTERVENTIONS:  - Assess patient's baseline mobility status (ambulation, transfers, stairs, etc )    - Identify cognitive and physical deficits and behaviors that affect mobility  - Identify mobility aids required to assist with transfers and/or ambulation (gait belt, sit-to-stand, lift, walker, cane, etc )  - [place] fall precautions as indicated by assessment  - Record patient progress and toleration of activity level on Mobility SBAR; progress patient to next Phase/Stage  - Instruct patient to call for assistance with activity based on assessment  - Consider rehabilitation consult to assist with strengthening/weightbearing, etc   Outcome: Progressing     Problem: DISCHARGE PLANNING  Goal: Discharge to home or other facility with appropriate resources  Description  INTERVENTIONS:  - Identify barriers to discharge w/patient and caregiver  - Arrange for needed discharge resources and transportation as appropriate  - Identify discharge learning needs (meds, wound care, etc )  - Arrange for interpretive services to assist at discharge as needed  - Refer to Case Management Department for coordinating discharge planning if the patient needs post-hospital services based on physician/advanced practitioner order or complex needs related to functional status, cognitive ability, or social support system  Outcome: Progressing

## 2019-10-20 NOTE — RESPIRATORY THERAPY NOTE
RT Protocol Note  Ryder Garcia 80 y o  female MRN: [id number]  Unit/Bed#: -01 Encounter: [id number]    Assessment    Active Problems:    Hypothyroidism    H/O: CVA (cerebrovascular accident)    Abnormal CT scan, cervical spine    Essential hypertension    Chronic diastolic heart failure (HCC)    Depression    S/P AVR    Acute-on-chronic kidney injury (HCC)    Anemia    SDH (subdural hematoma) ([facility])    AZUL (obstructive sleep apnea)    Hyperlipidemia      Home Pulmonary Medications:  Albuterol inhaler prn       Past Medical History:   Diagnosis Date    Arthritis     Breast cancer ([facility] ) 2015    Cardiac disease     aortic valve transplant    CHF (congestive heart failure) ([facility])     Compression fracture of body of thoracic vertebra ([facility])     CVA (cerebral vascular accident) ([facility] )     Diabetes mellitus ([facility] )     Disease of thyroid gland     Fibromyalgia, primary     H/O cervical fracture 01/09/2019    Hyperlipidemia     Hypertension     Neuropathy     AZUL (obstructive sleep apnea) 10/19/2019    Pressure injury of skin     Renal disorder     kidney stent    Stroke [facility])      Social History     Socioeconomic History    Marital status:       Spouse name: None    Number of children: 3    Years of education: None    Highest education level: None   Occupational History    None   Social Needs    Financial resource strain: None    Food insecurity:     Worry: None     Inability: None    Transportation needs:     Medical: None     Non-medical: None   Tobacco Use    Smoking status: Never Smoker    Smokeless tobacco: Never Used   Substance and Sexual Activity    Alcohol use: Never     Frequency: Never     Binge frequency: Never    Drug use: No    Sexual activity: Not Currently   Lifestyle    Physical activity:     Days per week: None     Minutes per session: None    Stress: None   Relationships    Social connections:     Talks on phone: None     Gets together: None     Attends Shinto service: None     Active member of club or organization: None     Attends meetings of clubs or organizations: None     Relationship status: None    Intimate partner violence:     Fear of current or ex partner: None     Emotionally abused: None     Physically abused: None     Forced sexual activity: None   Other Topics Concern    None   Social History Narrative    ** Merged History Encounter **            Subjective  States breathing is back to baseline       Objective  No respiratory distress noted  Physical Exam:   Assessment Type: (P) Pre-treatment  General Appearance: (P) Alert, Awake  Respiratory Pattern: (P) Normal  Chest Assessment: (P) Chest expansion symmetrical  Bilateral Breath Sounds: (P) Diminished  Cough: (P) Non-productive    Vitals:  Blood pressure 135/56, pulse 76, temperature 97 7 °F (36 5 °C), temperature source Tympanic, resp  rate 18, height 4' 11" (1 499 m), weight 87 kg (191 lb 12 8 oz), SpO2 95 %, not currently breastfeeding  Results from last 7 days   Lab Units 10/16/19  1448   PH ART  7 374   PCO2 ART mm Hg 47 3*   PO2 ART mm Hg 87 7   HCO3 ART mmol/L 27 0   BASE EXC ART mmol/L 1 3   O2 CONTENT ART mL/dL 14 5*   O2 HGB, ARTERIAL % 94 8   ABG SOURCE  Radial, Left   CHUCK TEST  Yes       Imaging and other studies: I have personally reviewed pertinent reports              Plan    Respiratory Plan: (P) Home Bronchodilator Patient pathway        Will change to prn inhalers as per protocol

## 2019-10-20 NOTE — ASSESSMENT & PLAN NOTE
- Gait instability with recent fall   - Continue PT and OT evaluation and treatment as indicated  - Fall precautions

## 2019-10-20 NOTE — ASSESSMENT & PLAN NOTE
· Acute comprehensive interdisciplinary inpatient rehabilitation including PT, OT, and/or SLP, RN, CM, SW, dietary, psychology, etc   · Goal: modified independent  · right sdh  · per last neurosx note of 10/9 hold all blood thinners for 2 wks and then FU CTH at 2 wks time to determine if blood thinners can be resumed  Repeat CTOH ordered for 10/23  Awaiting neurosurgery call-back regarding DVT prophylaxis with heparin     · 2 week keppra course completed

## 2019-10-20 NOTE — ASSESSMENT & PLAN NOTE
Results from last 7 days   Lab Units 10/21/19  0533 10/18/19  0513 10/17/19  0443   CREATININE mg/dL 1 41* 1 53* 1 50*       · Continue monitoring kidney function via intermittent BMP  · Avoid nephrotoxic meds/NSAIDs  · Encourage fluid intake  · Nephrology team following, will appreciate recs

## 2019-10-20 NOTE — ASSESSMENT & PLAN NOTE
Temp:  [97 1 °F (36 2 °C)-98 2 °F (36 8 °C)] 98 2 °F (36 8 °C)  HR:  [56-72] 72  Resp:  [16-19] 16  BP: (116-149)/(55-78) 149/78    · Continue Amlodipine  · Continue Metoprolol   · IM service managing anti-hypertensives, adjusting as needed

## 2019-10-20 NOTE — ASSESSMENT & PLAN NOTE
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

## 2019-10-20 NOTE — PROGRESS NOTES
10/20/19 1300   Patient Data   Rehab Impairment Impairment of mobility, safety and Activities of Daily Living (ADLs) due to Brain Dysfunction:  02 22  Traumatic, Closed Injury    Etiologic Diagnosis Traumatic SDH s/p Fall   Date of Onset 10/08/19   Support System   Relationship Daughter   Able to provide 24 hour supervision No  (works MWF)   Able to provide physical help? Yes   Home Setup   Type of Home Single Level   Method of Entry Ramp   Number of Stairs 0   Number of Stairs in Home 0   First Floor Bathroom Full; Shower;Curtain;Grab Bars;Built-in shower seat   First Floor Bathroom Accessibility Grab bars by toilet;Grab bars in tub/shower;Built in shower seat; Shower chair;Raised toilet seat   First Floor Setup Available Yes   Home Modifications Necessary? No   Available Equipment Roller Walker;Rollator; Wheelchair;Bedside Commode  (transport chair)   [address] [place] [address] prior to Admission [address]  (every monday)   Prior Device(s) Used Hema Dave   Prior IADL Participation   Money Management   (daughter completes)   Meal Preparation   (daughter completes)   Vinnie Cameron   (daughter completes)   Home Cleaning   (daughter completes)   Prior Level of Function   Self-Care 2  Needed Some Help - Patient needed a partial assistance from another person to complete activities  Indoor-Mobility (Ambulation) 3  Independent - Patient completed the activities by him/herself, with or without an assistive device, with no assistance from a helper  Stairs 2  Needed Some Help - Patient needed a partial assistance from another person to complete activities  Functional Cognition 3  Independent - Patient completed the activities by him/herself, with or without an assistive device, with no assistance from a helper  Prior Assistance Needed for Household Chores/Cleaning;Meal Preparation;Medication Management;Money Management;Driving; Shopping   Prior Device Used D   Walker   Patient Preference   Nickname (Patient Preference) Lilliana Archer   Psychosocial   Psychosocial (WDL) WDL   Patient Behaviors/Mood Cooperative   Length of Time/Family Visitation 2-4 hrs   Restrictions/Precautions   Precautions Fall Risk;Bed/chair alarms;Supervision on toilet/commode   Weight Bearing Restrictions No   ROM Restrictions No   Pain Assessment   Pain Assessment 0-10   Pain Score 6   Pain Type Chronic pain   Pain Location Neck   Pain Orientation Right   Pain Radiating Towards shoulders   Pain Descriptors Aching   Pain Frequency Constant/continuous   Pain Onset Ongoing   Clinical Progression Gradually improving   Effect of Pain on Daily Activities impacts functional mobility with RW   Patient's Stated Pain Goal No pain   Hospital Pain Intervention(s) Heat applied;Relaxation technique; Rest  (warm wash cloth during bathing to area)   Response to Interventions tolerated reporting decreased pain   Grooming   Able To Comb/Brush Hair;Wash/Dry Face;Wash/Dry Hands   Limitation Noted In Strength;Timeliness   Findings Pt seated in w/c to complete grooming, pt refusing to complete oral hygiene on this day    Tub/Shower Transfer   Reason Not Assessed Patient refusal  (pt reporting increased fatigue, requesting sponge bath)   Shower/Bathe Self   Type of Assistance Needed Physical assistance   Amount of Physical Assistance Provided 75% or more   Comment Pt seated in w/c to complete sponge bath  Pt able to wash UB  [place], requires maxA for washing below kness, requires modA to maintain balance while in stance to wash loreto area, therapist A to wash buttock area   Pt rpeorts daughter A with LB washing as needed   Shower/Bathe Self CARE Score 2   Bathing   Assessed Bath Style Sponge Bath   Anticipated D/C Bath Style Shower   Able to [place] No   Able to [employer] Temperature No   Able to Wash/Rinse/Dry (body part) Left Arm;Right Arm;L Upper Leg;R Upper Leg;Chest;Abdomen;Perineal Area   Limitations Noted in Balance; Coordination; Endurance;ROM;Safety;Strength;Timeliness   Positioning Seated;Standing   Findings  see above   Dressing/Undressing Clothing   Remove UB Clothes [place]raa;Other  (sweater)   Don UB Clothes Zahraa;Other  (sweater)   Type of Assistance Needed Physical assistance   Amount of Physical Assistance Provided 75% or more   Comment Pt requiring maxA to doff/don UB clothing  Requiring extra A 2* to increase pain on R side of neck   Upper Body Dressing CARE Score 2   Remove LB Clothes Pants; Shoes;Socks; Other  (puul ups)   Don LB Clothes Pants;Socks; Shoes; Other  (pull ups)   Type of Assistance Needed Physical assistance   Amount of Physical Assistance Provided 75% or more   Comment Pt requiring maxA to don pants, however, when slightly below knees pt able to pull up, pt requiring modA to maintain balance while in stance to pull pants up  Pt observed to hace decreased activity tolerance as evidenced by increased heavy breathing after standing   Lower Body Dressing CARE Score 2   Limitations Noted In Balance; Coordination; Endurance; Safety;Timeliness   Adaptive Equipment   (dressing stick,shoe horn and lrg metal sock aid @home)   Putting On/Taking Off Footwear   Type of Assistance Needed Physical assistance   Amount of Physical Assistance Provided Total assistance   Comment TA to don compression sock and sneakers   Putting On/Taking Off Footwear CARE Score 1   Toileting Hygiene   Type of Assistance Needed Physical assistance   Amount of Physical Assistance Provided Total assistance   Comment 2* to decreased activity tolerance, standing balance and inability to reach buttock in addition increased A 2* to bowel incontinence and diahrrea   Toileting Hygiene CARE Score 1   Toilet Transfer   Surface Assessed Raised Toilet   Transfer Technique Stand Pivot   Limitations Noted In Balance; Endurance; Safety;UE Strength;LE Strength   Adaptive Equipment Grab Bar   Findings Pt placed in fron of GB, pt able to pull self up, requiring modA to pull self up and pivot to oielt with A required for slow descent   Type of Assistance Needed Physical assistance   Amount of Physical Assistance Provided 50%-74%   Comment see above   Toilet Transfer CARE Score 2   Toileting   Able to Pull Clothing down no, up no  Able to Manage Clothing Closures No   Manage Hygiene Bowel   Limitations Noted In Balance; Coordination;UE Strength;LE Strength   Adaptive Equipment Grab Bar   Transfer Bed/Chair/Wheelchair   Positioning Concerns Skin Integrity   Limitations Noted In Balance; Coordination; Endurance;Problem Solving;UE Strength;LE Strength   Adaptive Equipment Roller Walker   Findings Pt requiring modA to perform SPT with RW with vc for proper hand/foot placement   Type of Assistance Needed Physical assistance   Amount of Physical Assistance Provided 50%-74%   Comment see above   Chair/Bed-to-Chair Transfer CARE Score 2   Sit to Stand   Type of Assistance Needed Physical assistance   Amount of Physical Assistance Provided 50%-74%   Comment Pt requires between modA based upon fatigue level  Pt reporting increased fatigue this afternoon requiring more A   Sit to Stand CARE Score 2   Picking Up Object   Reason if not Attempted Safety concerns   Picking Up Object CARE Score 88   RUE Assessment   RUE Assessment WFL   LUE Assessment   LUE Assessment WFL   Cognition   Overall Cognitive Status WFL   Arousal/Participation Alert; Cooperative   Attention Within functional limits   Orientation Level Oriented X4   Memory Decreased short term memory;Decreased recall of recent events;Decreased recall of biographical information   Following Commands Follows one step commands with increased time or repetition   Discharge Information   Impressions Patient is an 80year old female with PMH pg CHF, breast cancer, cardiac disease (aortic transplant), CVA, h/o cervical fx, HTN, renal disorder that presented to [place] [address] for evaluation s/p fall at home   Per patient's daughter, patient sustained the fall due to leg weakness when trying to go up the stairs  Patient denies hitting her head or loss of consciousness  Patient was recently hospitalized at [address] [place] [address] (10/8 - 10/10) following a fall while there for an outpatient visit  Imaging revealed a traumatic subdural hematoma  CT of the head showed right parafalcine extra-axial hemorrhage, stable; no new hemorrhage; chronic small vessel ischemic changes  dose IV Lasix x2, with noted improvement  CXR showed cardiomegaly and mild pulmonary vascular congestion  ECHO completed in August of 2019 showed an EF of 55%, moderate hypokinesis of mid anteroseptal wall, grade 2 diastolic dysfunction; LA mildy dilated; mild to moderate MV annular calcification and mild regurgitation  Pt currently lives at daughters home with a 1st floor s/u with ramp to enter  Pt with good family support  Pt reports daughter works MWF and is home alone from ~9am-2pm when LEXA comes home from work  Pt reports having a home health aide every Monday  Pt has a full bathroom with walk in shower with grab bars, built in seat and shower chair  Pt reports she mainly uses the shower chair  Has raised toilet seat with grab bars around  Pt reports PLOF includes daughter A with ADLs as needed, daughter completes all ADLs  Pt reports daughter would cook meal and leave in kitchen and pt would need to use RW to retrieve food items for meal  Pt reports for lB dressing she previously used dressing stick, shoe horn, and lrg metal sock aid  Currently pt requirng b/t modA/max for UB/LB dressing  Pt currently performing functional transfers with RW including SPT with modA from recliner<>w/c and w/c<> toilet  Pt seen for 90mins of skilled OT services with emphasis on self care and functional transfers   Pt presents with the following performance component deficits impacting ADL/IADL skills: decreased activity tolerance, increased SOB, decreased hip flex for lB dressing, increased neck pain, decreased strength, decreased dynamic standing balance impacting participation in ADL tasks   Pt can benefit from 2 weeks of skilled OT services to address these areas and resume prior occupational roles to maximize independence to reduce risk of fall and decrease risk of readmission   OT Therapy Minutes   OT Time In 1300   OT Time Out 1430   OT Total Time (minutes) 90   OT Mode of treatment - Individual (minutes) 90   OT Mode of treatment - Concurrent (minutes) 0   OT Mode of treatment - Group (minutes) 0   OT Mode of treatment - Co-treat (minutes) 0   OT Mode of Treatment - Total time(minutes) 90 minutes   OT Cumulative Minutes 90   Cumulative Minutes   Cumulative therapy minutes 90

## 2019-10-20 NOTE — ASSESSMENT & PLAN NOTE
- Small traumatic subdural hematoma, stable on repeat imaging this admission without neurologic deficit   - Outpatient follow-up with Neurosurgery in approximately 2 weeks with repeat CT scan of the head prior to follow-up

## 2019-10-20 NOTE — ASSESSMENT & PLAN NOTE
Results from last 7 days   Lab Units 10/21/19  0533 10/18/19  0513 10/17/19  0443   HEMOGLOBIN g/dL 8 8* 8 3* 8 7*     · Monitor CBC intermittently  · Transfuse for Hgb <7

## 2019-10-20 NOTE — ASSESSMENT & PLAN NOTE
Wt Readings from Last 3 Encounters:   10/20/19 87 kg (191 lb 12 8 oz)   10/17/19 86 2 kg (190 lb 0 6 oz)   10/10/19 84 3 kg (185 lb 14 4 oz)     - grade 2   - on chest XR of 10/17 vascular congestion and small b/l pleural effusions  - on torsemide  - volume currently stable

## 2019-10-20 NOTE — ASSESSMENT & PLAN NOTE
- Acute kidney injury superimposed on chronic kidney disease improved during this admission   - Continue current medication regimen on discharge  - Short-term outpatient follow-up with primary care provider

## 2019-10-20 NOTE — PROGRESS NOTES
OT LONG TERM GOALS       10/20/19 1300   Rehab Team Goals   ADL Team Goal Patient will require supervision with ADLs with least restrictive device upon completion of rehab program   Rehab Team Interventions   OT Interventions Self Care; Therapeutic Exercise;Community Reintegration; Energy Conservation;Patient/Family Education;Group Therapy   Eating Goal   Eating Goal 06  Independent - Patient completes the activity by him/herself with no assistance from a helper  Assist Device Upper Dentures;Partial Dentures   Status Ongoing; Target goal - two weeks   Grooming Goal   Oral Hygiene Goal 04  Supervision or touching assistance- [place] provides VERBAL CUES or supervision throughout activity  Task Wash/Dry Face;Wash/Dry Hands;Brush Teeth;Comb Hair;Complete Groom; Initiate Task   Environment Seated in Chair   Status Ongoing; Target goal - two weeks   Intervention Tolerance Work; Therapeutic Exercise   Tub/Shower Transfer Goal   Method Shower Stall   Assist Device Seat with Back;Grab Bar;Hand Held Shower   Status Ongoing; Target goal - two weeks   Interventions ADL Training   Bathing Goal   Shower/bathe self Goal 04  Supervision or touching assistance- [place] provides VERBAL CUES or supervision throughout activity  Environment Seated;Standing; Shower   Adaptive Equipment Long Handle Sponge;Grab Bar;Seat with back;Hand Held Shower   Status Ongoing; Target goal - two weeks   Intervention ADL Training; Therapeutic Exercise;Neuromuscular Education   Upper Body Dressing Goal   Upper body dressing Goal 04  Supervision or touching assistance- [place] provides VERBAL CUES or supervision throughout activity  Task Upper Body;Arms in/out; Over Head   Environment Seated;Standing   Status Ongoing; Target goal - two weeks   Intervention Therapeutic Exercise; Tolerance Work   Lower Body Dressing Goal   Lower body dressing Goal 04  Supervision or touching assistance- [place] provides VERBAL CUES or supervision throughout activity     Putting on/taking off footwear Goal 04  Supervision or touching assistance- [place] provides VERBAL CUES or supervision throughout activity  Task Lower Body;Shoe/Slipper;Socks;Pants; Undergarment   Adaptive Equipment Sock Aide; Shoe Horn;Other  (lrg metal sock aid)   Environment Seated;Standing   Safety Precautions Verbal Instruction   Status Ongoing; Target goal - two weeks   Intervention Balance Work; Therapeutic Exercise; Tolerance Work   Toileting Transfer Goal   Toilet transfer Goal 04  Supervision or touching assistance- [place] provides VERBAL CUES or supervision throughout activity  Assistive Device Roller Walker;Raised Toilet Seat;Grab Bar   Safety Verbal Instruction   Status Ongoing; Target goal - two weeks   Toileting Goal   Toileting hygiene Goal 04  Supervision or touching assistance- [place] provides VERBAL CUES or supervision throughout activity  Task Pants Down;Pants Up;Hygiene   Assistive Device Grab Bar   Safety Balance   Status Ongoing; Target goal - two weeks   Intervention Balance Work;ADL Training

## 2019-10-20 NOTE — CONSULTS
Consult- Twila Ferrari 1934, 80 y o  female MRN: [id number]    Unit/Bed#: [facility] 270-01 Encounter: [id number]    Primary Care Provider: Elizabeth Andrade DO   Date and time admitted to hospital: 10/19/2019  1:40 PM      Inpatient consult to Internal Medicine  Consult performed by: Mayco Arguelles  Consult ordered by: Marshall Bridges MD          Hyperlipidemia  Assessment & Plan  Patient currently not on statin  Recommend healthy lifestyle choices for your cholesterol  Low fat/low cholesterol diet  Limit/avoid red meat  Eat more lean meat - chicken breast, ground turkey, fish  Exercise 30 mins at least 5 times a week as tolerated  AZUL (obstructive sleep apnea)  Assessment & Plan  - non-compliant at home and has not used CPAP for 10 to 15 years now  Can not find old sleep study, will place order for new sleep study    SDH (subdural hematoma) ([facility] [address] )  Assessment & Plan  - right sdh  - per last neurosx note of 10/9 hold all blood thinners for 2 wks and then FU CT at 2 wks time to determine if blood thinners can be resumed   - was on HSQ in acute care for DVT ppx however will use SCDs only until verbal clearance from neurosx which, will need to obtain clearance from neurosurgery   - completed 14 doses of keppra for sz ppx, need to confirm with neurosurgery if patient needs to continue on keppra    Anemia  Assessment & Plan  - per EMR present going back to 01/2019  - currently 8 3, recheck on 10/21  - patient reports iron deficiency anemia, will get iron panel, folate, B12 and supplement as neccessary     Acute-on-chronic kidney injury [facility])  Assessment & Plan  - baseline appears to be 1 0-1 2 however has not been below 1 5 since August   - currently 1 53 and this may represent new baseline however patient is on torsemide  - recheck on 10/21  - nephrology consulted  by physiatry recommendations as stated below:  ? Continue to monitor at steady state to determine true baseline    As mentioned there may still be some affect related to recent acute kidney injury and poor renal reserve due to advanced age nephron loss  ? Continued torsemide 40 mg daily and monitor- patient appears euvolemic  ? Check renal ultrasound for completeness  ? Check vitamin-D level    S/P AVR  Assessment & Plan  - bioprosthetic valve   - nl fxn per last echo in EMR     Depression  Assessment & Plan  Well controlled on Zoloft    Chronic diastolic heart failure (HCC)  Assessment & Plan  Wt Readings from Last 3 Encounters:   10/20/19 87 kg (191 lb 12 8 oz)   10/17/19 86 2 kg (190 lb 0 6 oz)   10/10/19 84 3 kg (185 lb 14 4 oz)     - grade 2   - on chest XR of 10/17 vascular congestion and small b/l pleural effusions  - on torsemide  - volume currently stable           Essential hypertension  Assessment & Plan  - torsemide  - norvasc    /56    Abnormal CT scan, cervical spine  Assessment & Plan  - on CT c-spine of 10/8 report noted stable C6 anterior osteophyte fx and 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  - evaluated on 10/9 by neurosx and no intervention/further BENNETT recommended       H/O: CVA (cerebrovascular accident)  Assessment & Plan  - home plavix on hold due to extra-axial hemorrhage, per neurosx consult of 10/9 to be held for 2 wks until FU CT at which time neurosx will decide if plavix may be resumed  - intolerant to statins per EMR     Hypothyroidism  Assessment & Plan  Most recent TSH normal   Patient currently on home dose of levothyroxine 112mcg  VTE Prophylaxis: Reason for no pharmacologic prophylaxis patient with SAH  / sequential compression device     Recommendations for Discharge:  · As per treatment team     Counseling / Coordination of Care Time: 45 minutes  Greater than 50% of total time spent on patient counseling and coordination of care      History of Present Illness:    Candice Copeland is a 80 y o  female who is originally admitted to the [facility] on 10/19/2019 due to gait instability  Patient originally presented to the emergency room complaining of weakness  Patient had previously recently been admitted to trauma service for fall with traumatic subdural hematoma  She was admitted to the trauma service with weakness and gait instability following a recent fall and traumatic brain injury as well as mildly elevated creatinine following a recent a KI noted prior to her admission  At this time there was no evidence of worsening or new traumatic brain injury  It was recommended that she would be evaluated by therapy services and short-term rehabilitation  Due to interruption of her diuretic therapy as part of her management of her achy eye, she did have an acute exacerbation of her chronic CHF resulting in admission to the ICU secondary to respiratory decompensation  On discharge patient is instructed to follow up with patient's PCP to review events of patient's recent hospitalization  The patient is instructed to follow up with [facility] in 2 weeks for re-evaluation of her traumatic brain injury with a repeat CT scan of the head 2-3 days prior to the appointment  The patient is instructed to follow up with her cardiologist Dr Max Altman to review the events of her recent hospitalizations and discuss her diuretic regimen  We are consulted for medical management  Patient has past medical history of hypothyroidism, hypertension, depression, anemia CHF, and chronic kidney disease  Patient also noted to have history of AZUL which she has been noncompliant with her CPAP machine  Review of Systems:    Review of Systems   Constitutional: Negative for activity change, appetite change, chills, diaphoresis and fever  HENT: Negative for congestion, ear discharge, ear pain, postnasal drip, rhinorrhea, sinus pressure, sinus pain and sore throat  Eyes: Negative for pain, discharge, itching and visual disturbance     Respiratory: Negative for cough, chest tightness, shortness of breath and wheezing  Cardiovascular: Positive for palpitations (at times with ambulation)  Negative for chest pain and leg swelling  Gastrointestinal: Negative for abdominal pain, constipation, diarrhea, nausea and vomiting  Endocrine: Negative for polydipsia, polyphagia and polyuria  Genitourinary: Negative for difficulty urinating, dysuria and urgency  Musculoskeletal: Negative for arthralgias, back pain and neck pain  Skin: Negative for rash and wound  Neurological: Negative for dizziness, weakness, numbness and headaches  Past Medical and Surgical History:     Past Medical History:   Diagnosis Date    Arthritis     [facility]    Cardiac disease     aortic valve transplant    [facility]est[facility] failure[facility]     Compression fracture of body of thoracic vertebra ([facility])     CVA [facility]cerebral vascular accident) ([facility] )     Diabetes mellitus ([facility] )     Disease of thyroid gland     Fibromyalgia, primary     H/O cervical fracture 01/09/2019    Hyperlipidemia     Hypertension     Neuropathy     AZUL (obstructive sleep apnea) 10/19/2019    Pressure injury of skin     Renal disorder     kidney stent    Stroke [facility])        Past Surgical History:   Procedure Laterality Date    AORTIC VALVE SURGERY      BREAST LUMPECTOMY Right     BREAST LUMPECTOMY Left     BREAST SURGERY      lumpectomy for breast cancer    CATARACT EXTRACTION      CHOLECYSTECTOMY      HYSTERECTOMY  1978    KIDNEY SURGERY      stent placed for kidney    OTHER SURGICAL HISTORY      abdominal aneurysm surgery    CA ARTHRODESIS POSTERIOR ATLAS-AXIS C1-C2 N/A 5/1/2019    Procedure: C1-C2 lateral mass fixation fusion with possible sublaminar cables;  Surgeon: Vanessa Benavides MD;  [facility]: [facility];  Service: Neurosurgery    REPLACEMENT TOTAL KNEE      left        Meds/Allergies:    all medications and allergies reviewed    Allergies:    Allergies   Allergen Reactions    Duloxetine Hcl Other (See Comments) and Hypertension    Duloxetine Hcl      Cymbalta; Hemorrhagic stroke listed as reaction    Escitalopram      Other reaction(s): Urinary Retention    Pregabalin      Other reaction(s): Hypertension    Statins Myalgia    Tramadol      Other reaction(s): Hypertension    Triprolidine-Pse Other (See Comments)    Anastrozole Abdominal Pain    Anastrozole     Antihistamines, Diphenhydramine-Type     Exemestane      Aromasin; Muscle pain & cramps    Lexapro [Escitalopram]      Urinary retention    Lyrica [Pregabalin]      hypertension    Metformin      diarrhea    Oxycodone      Pt unsure      Statins      Muscle pain & cramps    Tramadol      hypertension    Triprolidine-Pseudoephedrine     Metformin Diarrhea       Social History:     Marital Status:      Substance Use History:   Social History     Substance and Sexual Activity   Alcohol Use Never    Frequency: Never    Binge frequency: Never     Social History     Tobacco Use   Smoking Status Never Smoker   Smokeless Tobacco Never Used     Social History     Substance and Sexual Activity   Drug Use No       Family History:    Family History   Problem Relation Age of Onset    Heart disease Mother     Arthritis Mother     Diabetes Mother     Heart failure Father     Arthritis Father     Other Father         enlargement of prostate     Dementia Father     No Known Problems Daughter     No Known Problems Maternal Grandmother     No Known Problems Maternal Grandfather     No Known Problems Paternal Grandmother     No Known Problems Paternal Grandfather     No Known Problems Maternal Aunt     No Known Problems Maternal Aunt     No Known Problems Maternal Aunt     No Known Problems Maternal Aunt     No Known Problems Paternal Aunt     No Known Problems Paternal Aunt        Physical Exam:     Vitals:   Blood Pressure: 135/56 (10/20/19 0830)  Pulse: 76 (10/20/19 0830)  Temperature: 97 7 °F (36 5 °C) (10/20/19 0830)  Temp Source: Tympanic (10/20/19 0830)  Respirations: 18 (10/20/19 0830)  Height: 4' 11" (149 9 cm) (10/19/19 1340)  Weight - Scale: 87 kg (191 lb 12 8 oz) (10/20/19 0840)  SpO2: 95 % (10/20/19 1436)    Physical Exam   Constitutional: She is oriented to person, place, and time  She appears well-developed and well-nourished  No distress  HENT:   Head: Normocephalic and atraumatic  Right Ear: External ear normal    Left Ear: External ear normal    Nose: Nose normal    Mouth/Throat: Oropharynx is clear and moist  No oropharyngeal exudate  Eyes: Pupils are equal, round, and reactive to light  Conjunctivae and EOM are normal  Right eye exhibits no discharge  Left eye exhibits no discharge  Neck: Normal range of motion  Neck supple  No thyromegaly present  Cardiovascular: Normal rate, regular rhythm, normal heart sounds and intact distal pulses  Exam reveals no gallop and no friction rub  No murmur heard  Pulmonary/Chest: Effort normal  No stridor  No respiratory distress  She has no wheezes  She has rales (RL)  Abdominal: Soft  Bowel sounds are normal  She exhibits no distension  There is no tenderness  Musculoskeletal: She exhibits edema (trace to bilateral lower extremities)  Lymphadenopathy:     She has no cervical adenopathy  Neurological: She is alert and oriented to person, place, and time  Skin: Skin is warm and dry  No rash noted  She is not diaphoretic  No erythema  Psychiatric: She has a normal mood and affect  Her behavior is normal  Judgment and thought content normal          Additional Data:     Lab Results: I have personally reviewed pertinent reports        Results from last 7 days   Lab Units 10/18/19  0513   WBC Thousand/uL 6 25   HEMOGLOBIN g/dL 8 3*   HEMATOCRIT % 26 9*   PLATELETS Thousands/uL 193   NEUTROS PCT % 62   LYMPHS PCT % 26   MONOS PCT % 8   EOS PCT % 3     Results from last 7 days   Lab Units 10/18/19  0513   POTASSIUM mmol/L 4 1   CHLORIDE mmol/L 103   CO2 mmol/L 28   BUN mg/dL 37*   CREATININE mg/dL 1 53*   CALCIUM mg/dL 8 6           Imaging: I have personally reviewed pertinent reports  Xr Chest Portable    Result Date: 10/17/2019  Narrative: CHEST INDICATION:   effusions  COMPARISON:  Chest x-ray from October 16, 2019  EXAM PERFORMED/VIEWS:  XR CHEST PORTABLE FINDINGS: Stable cardiomediastinal silhouette  Poststernotomy changes  Atherosclerotic calcification of the aortic knob  Improved aeration with interval decrease in the streaky densities noted at the left base  There are persistent patchy airspace densities in  the right mid and lower lung  Mild stable prominence of the hilar shadows  Mild blunting of costophrenic angles bilaterally  Osseous structures appear within normal limits for patient age  Impression: Persistent cardiomegaly and central vascular congestion with small bilateral pleural effusions  Persistent patchy airspace densities in the right mid and lower lung  Right basilar pneumonia not excluded  Workstation performed: [id number]     Xr Chest Portable    Result Date: 10/16/2019  Narrative: CHEST INDICATION:   shortness in breath  COMPARISON:  10/15/2019 EXAM PERFORMED/VIEWS:  XR CHEST PORTABLE FINDINGS:  There are median sternotomy wires indicating prior cardiac surgery  Heart shadow is enlarged but unchanged from prior exam  Stable mild pulmonary vascular congestion  No pneumothorax or pleural effusion  Osseous structures appear within normal limits for patient age  Impression: Stable cardiomegaly with mild pulmonary vascular congestion  Workstation performed: [id number]     Xr Chest Portable    Result Date: 10/15/2019  Narrative: CHEST INDICATION:   evaluate lung fields  COMPARISON:  8/16/2019 EXAM PERFORMED/VIEWS:  XR CHEST PORTABLE FINDINGS: The heart is enlarged  The patient is status post aortic valve replacement  There is aortic knob calcification  There is mild pulmonary vascular congestion   Sternal wires are present  Visualized osseous structures appear otherwise unremarkable for the patient's age  Impression: Cardiomegaly and mild pulmonary vascular congestion  Workstation performed: [id number]     Xr Spine Cervical Complete 6+ Vw Flex/ext/obl    Result Date: 9/20/2019  Narrative: CERVICAL SPINE INDICATION:   Z98 1: Arthrodesis status  COMPARISON:  None VIEWS:  XR SPINE CERVICAL COMPLETE 6+ VW FLEX /EXT /OBL FINDINGS: Prior posterior C1-2 fusion  Hardware appears intact and stable in position  No evidence of dynamic instability seen on flexion or extension  Diffuse hypertrophic anterior flowing osteophytes appearing stable  Normal foraminal are patent  There is mild narrowing on the right at C6 level  The prevertebral soft tissues are within normal limits  Impression: No plain film evidence of instability  Hardware appears intact  Workstation performed: [id number]     Ct Head Without Contrast    Result Date: 10/12/2019  Narrative: CT BRAIN - WITHOUT CONTRAST INDICATION:   fall  COMPARISON:  January 25, 2019 TECHNIQUE:  CT examination of the brain was performed  In addition to axial images, coronal 2D reformatted images were created and submitted for interpretation  Radiation dose length product (DLP) for this visit:  886 mGy-cm   This examination, like all CT scans performed in the [facility], was performed utilizing techniques to minimize radiation dose exposure, including the use of iterative reconstruction and automated exposure control  IMAGE QUALITY:  Diagnostic  FINDINGS: PARENCHYMA:  Moderate periventricular and white matter hypodensity seen related to chronic small vessel ischemic changes There is no new intraparenchymal hemorrhage seen VENTRICLES AND EXTRA-AXIAL SPACES:  Again noted is a right parafalcine hemorrhage through the inferior aspect of the falx, measuring 6 mm x 4 mm    This is stable There is no new extra-axial collection seen VISUALIZED ORBITS AND PARANASAL SINUSES: Unremarkable  CALVARIUM AND EXTRACRANIAL SOFT TISSUES:  Normal  Soft tissue hematoma seen likely    Impression: Right parafalcine extra-axial hemorrhage, stable No new extra-axial hemorrhage No mass effect or midline shift Chronic small vessel ischemic changes Workstation performed: [id number]     Ct Head Wo Contrast    Result Date: 10/9/2019  Narrative: CT BRAIN - WITHOUT CONTRAST INDICATION:   follow up SDH  COMPARISON:  Head CT 10/8/2019 TECHNIQUE:  CT examination of the brain was performed  In addition to axial images, coronal 2D reformatted images were created and submitted for interpretation  Radiation dose length product (DLP) for this visit:  926 28 mGy-cm   This examination, like all CT scans performed in the [facility], was performed utilizing techniques to minimize radiation dose exposure, including the use of iterative  reconstruction and automated exposure control  IMAGE QUALITY:  Diagnostic  FINDINGS: PARENCHYMA: No acute intraparenchymal hemorrhage  No masslike lesion, mass effect effect or midline shift  No CT evidence for acute infarct  Cerebral atrophy  Decreased attenuation is noted in periventricular and subcortical white matter, which is nonspecific, but is most consistent with moderate to severe microangiopathic change  Sequela of remote infarcts in the right basal ganglia and anterolateral left thalamus  VENTRICLES AND EXTRA-AXIAL SPACES:  The ventricles are normal in size for patient's age  Right anterior parafalcine subdural hematoma measuring up to 4 mm in maximal thickness is grossly stable from prior exam   No new acute intracranial hemorrhage  VISUALIZED ORBITS AND PARANASAL SINUSES:  Orbits are unremarkable  Paranasal sinuses are clear  CALVARIUM AND EXTRACRANIAL SOFT TISSUES:  Partially seen C1 fusion screws  Decreased posterior scalp soft tissue swelling /hematoma     Impression: 1    Grossly stable right anterior parafalcine subdural hematoma measuring up to 4 mm in maximum thickness  No new acute intracranial hemorrhage  2   Chronic white matter microangiopathy Workstation performed: [id number]     Ct Head Without Contrast    Result Date: 10/8/2019  Narrative: CT BRAIN - WITHOUT CONTRAST INDICATION:   Head trauma, minor, normal mental status (Age 24-59y)  COMPARISON:  CT brain dated January 25, 2019  TECHNIQUE:  CT examination of the brain was performed  In addition to axial images, coronal 2D reformatted images were created and submitted for interpretation  Radiation dose length product (DLP) for this visit:  944 mGy-cm   This examination, like all CT scans performed in the [facility], was performed utilizing techniques to minimize radiation dose exposure, including the use of iterative reconstruction and automated exposure control  IMAGE QUALITY:  Diagnostic  FINDINGS: PARENCHYMA:  No intracranial mass, mass effect or midline shift  No CT signs of acute infarction  No acute parenchymal hemorrhage  There is marked periventricular white matter low attenuation which is nonspecific and most likely related to chronic small vessel ischemic changes  There is an old left pontine lacunar infarct  There are old bilateral basal ganglia lacunar infarcts  VENTRICLES AND EXTRA-AXIAL SPACES:  There is a tiny area of acute subdural hemorrhage at the left paramedian anterior falx measuring 3 mm in greatest width (series 2, image 27, 28 is (  There is prominence of the ventricles and sulci related to mild volume loss  VISUALIZED ORBITS AND PARANASAL SINUSES:  Unremarkable  CALVARIUM AND EXTRACRANIAL SOFT TISSUES:  There is subcutaneous scalp soft tissue swelling at the posterior parietal convexity  The calvarium is intact  Impression: Tiny acute subdural hematoma at the left paramedian anterior falx   No intraparenchymal hemorrhage  Marked chronic small vessel ischemic changes and mild volume loss    I personally discussed this study with RU MALONE Anoop Bhakta on 10/8/2019 at 4:22 PM  Workstation performed: [id number]     Ct Cervical Spine Without Contrast    Result Date: 10/8/2019  Narrative: CT CERVICAL SPINE - WITHOUT CONTRAST INDICATION:   Neck trauma (Age => 65y)  COMPARISON:  Cervical spine x-ray dated March 29, 2019  TECHNIQUE:  CT examination of the cervical spine was performed without intravenous contrast   Contiguous axial images were obtained  Sagittal and coronal reconstructions were performed  Radiation dose length product (DLP) for this visit:  376 mGy-cm   This examination, like all CT scans performed in the [facility], was performed utilizing techniques to minimize radiation dose exposure, including the use of iterative reconstruction and automated exposure control  IMAGE QUALITY:  Diagnostic  FINDINGS: ALIGNMENT:  There are interval postoperative changes of posterior fusion of C1 and C2  The surgical hardware appears intact  Again noted is approximately 5 mm of anterior displacement of the dens in relation to the body of C2 compatible with the patient's known history of fracture  VERTEBRAL BODIES:  No acute fracture  There is a stable nondisplaced transverse fracture through an anterior ridging osteophyte at C6  DEGENERATIVE CHANGES:  Moderate multilevel cervical degenerative changes are noted  No critical central canal stenosis  PREVERTEBRAL AND PARASPINAL SOFT TISSUES:  Unremarkable  THORACIC INLET:  Normal      Impression: No acute cervical spine fracture or subluxation  Postoperative changes of posterior cervical fusion at C1 and C2 for repair of a displaced odontoid fracture  There is stable 5 mm of anterior displacement of the odontoid process in relation to the body of C2  Moderate to marked 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 with multilevel anterior bridging osteophytes  There is a stable previously described nondisplaced fracture of an anterior bridging osteophyte at C6  Workstation performed: [id number]         M*Modal software was used to dictate this note  It may contain errors with dictating incorrect words or incorrect spelling  Please contact the provider directly with any questions

## 2019-10-21 ENCOUNTER — APPOINTMENT (INPATIENT)
Dept: RADIOLOGY | Facility: HOSPITAL | Age: 84
DRG: 280 | End: 2019-10-21
Payer: COMMERCIAL

## 2019-10-21 ENCOUNTER — APPOINTMENT (INPATIENT)
Dept: CT IMAGING | Facility: HOSPITAL | Age: 84
DRG: 280 | End: 2019-10-21
Payer: COMMERCIAL

## 2019-10-21 ENCOUNTER — HOSPITAL ENCOUNTER (INPATIENT)
Facility: HOSPITAL | Age: 84
LOS: 7 days | DRG: 280 | End: 2019-10-28
Attending: INTERNAL MEDICINE | Admitting: FAMILY MEDICINE
Payer: COMMERCIAL

## 2019-10-21 ENCOUNTER — APPOINTMENT (INPATIENT)
Dept: ULTRASOUND IMAGING | Facility: HOSPITAL | Age: 84
DRG: 949 | End: 2019-10-21
Payer: COMMERCIAL

## 2019-10-21 ENCOUNTER — APPOINTMENT (INPATIENT)
Dept: RADIOLOGY | Facility: HOSPITAL | Age: 84
DRG: 949 | End: 2019-10-21
Payer: COMMERCIAL

## 2019-10-21 VITALS
RESPIRATION RATE: 20 BRPM | OXYGEN SATURATION: 94 % | TEMPERATURE: 98.8 F | WEIGHT: 192.9 LBS | HEART RATE: 79 BPM | DIASTOLIC BLOOD PRESSURE: 92 MMHG | BODY MASS INDEX: 38.89 KG/M2 | HEIGHT: 59 IN | SYSTOLIC BLOOD PRESSURE: 149 MMHG

## 2019-10-21 DIAGNOSIS — J96.00 ACUTE RESPIRATORY FAILURE (HCC): Primary | ICD-10-CM

## 2019-10-21 DIAGNOSIS — J96.02 ACUTE RESPIRATORY FAILURE WITH HYPOXIA AND HYPERCAPNIA (HCC): ICD-10-CM

## 2019-10-21 DIAGNOSIS — J96.02 ACUTE RESPIRATORY FAILURE WITH HYPERCAPNIA (HCC): ICD-10-CM

## 2019-10-21 DIAGNOSIS — J96.01 ACUTE RESPIRATORY FAILURE WITH HYPOXIA (HCC): ICD-10-CM

## 2019-10-21 DIAGNOSIS — N17.9 ACUTE-ON-CHRONIC KIDNEY INJURY (HCC): ICD-10-CM

## 2019-10-21 DIAGNOSIS — I50.33 ACUTE ON CHRONIC DIASTOLIC HEART FAILURE (HCC): ICD-10-CM

## 2019-10-21 DIAGNOSIS — J96.01 ACUTE RESPIRATORY FAILURE WITH HYPOXIA AND HYPERCAPNIA (HCC): ICD-10-CM

## 2019-10-21 DIAGNOSIS — N18.9 ACUTE-ON-CHRONIC KIDNEY INJURY (HCC): ICD-10-CM

## 2019-10-21 PROBLEM — J96.21 ACUTE ON CHRONIC RESPIRATORY FAILURE WITH HYPOXIA (HCC): Status: ACTIVE | Noted: 2019-10-21

## 2019-10-21 PROBLEM — R79.89 ELEVATED TROPONIN: Status: ACTIVE | Noted: 2019-10-21

## 2019-10-21 PROBLEM — E11.40 CONTROLLED TYPE 2 DIABETES MELLITUS WITH DIABETIC NEUROPATHY, WITHOUT LONG-TERM CURRENT USE OF INSULIN (HCC): Status: ACTIVE | Noted: 2017-10-24

## 2019-10-21 PROBLEM — R77.8 ELEVATED TROPONIN: Status: ACTIVE | Noted: 2019-10-21

## 2019-10-21 PROBLEM — M32.9 SLE (SYSTEMIC LUPUS ERYTHEMATOSUS) (HCC): Status: ACTIVE | Noted: 2017-10-24

## 2019-10-21 LAB
25(OH)D3 SERPL-MCNC: 35.9 NG/ML (ref 30–100)
ABO GROUP BLD: NORMAL
ALBUMIN SERPL BCP-MCNC: 4.3 G/DL (ref 3–5.2)
ALP SERPL-CCNC: 88 U/L (ref 43–122)
ALT SERPL W P-5'-P-CCNC: 26 U/L (ref 9–52)
ANION GAP SERPL CALCULATED.3IONS-SCNC: 13 MMOL/L (ref 5–14)
ANION GAP SERPL CALCULATED.3IONS-SCNC: 8 MMOL/L (ref 5–14)
APTT PPP: 21 SECONDS (ref 25–32)
ARTERIAL PATENCY WRIST A: YES
ARTERIAL PATENCY WRIST A: YES
AST SERPL W P-5'-P-CCNC: 41 U/L (ref 14–36)
BASE EXCESS BLDA CALC-SCNC: -3.1 MMOL/L (ref -2.1–2.1)
BASE EXCESS BLDA CALC-SCNC: 2.8 MMOL/L (ref -2.1–2.1)
BILIRUB SERPL-MCNC: 0.7 MG/DL
BILIRUB UR QL STRIP: NEGATIVE
BLD GP AB SCN SERPL QL: NEGATIVE
BUN SERPL-MCNC: 46 MG/DL (ref 5–25)
BUN SERPL-MCNC: 48 MG/DL (ref 5–25)
CALCIUM SERPL-MCNC: 9.1 MG/DL (ref 8.4–10.2)
CALCIUM SERPL-MCNC: 9.4 MG/DL (ref 8.4–10.2)
CHLORIDE SERPL-SCNC: 91 MMOL/L (ref 97–108)
CHLORIDE SERPL-SCNC: 94 MMOL/L (ref 97–108)
CLARITY UR: CLEAR
CO2 SERPL-SCNC: 30 MMOL/L (ref 22–30)
CO2 SERPL-SCNC: 32 MMOL/L (ref 22–30)
COLOR UR: NORMAL
CREAT SERPL-MCNC: 1.41 MG/DL (ref 0.6–1.2)
CREAT SERPL-MCNC: 1.9 MG/DL (ref 0.6–1.2)
D DIMER PPP FEU-MCNC: 4.4 UG/ML FEU
ERYTHROCYTE [DISTWIDTH] IN BLOOD BY AUTOMATED COUNT: 15.2 %
FERRITIN SERPL-MCNC: 229 NG/ML (ref 8–388)
FLUAV + FLUBV RNA ISLT NAA+PROBE: NOT DETECTED
FLUAV + FLUBV RNA ISLT NAA+PROBE: NOT DETECTED
FOLATE SERPL-MCNC: >20 NG/ML (ref 3.1–17.5)
GFR SERPL CREATININE-BSD FRML MDRD: 24 ML/MIN/1.73SQ M
GFR SERPL CREATININE-BSD FRML MDRD: 34 ML/MIN/1.73SQ M
GLUCOSE P FAST SERPL-MCNC: 104 MG/DL (ref 70–99)
GLUCOSE SERPL-MCNC: 103 MG/DL (ref 65–140)
GLUCOSE SERPL-MCNC: 104 MG/DL (ref 70–99)
GLUCOSE SERPL-MCNC: 224 MG/DL (ref 70–99)
GLUCOSE SERPL-MCNC: 373 MG/DL (ref 65–140)
GLUCOSE SERPL-MCNC: 96 MG/DL (ref 65–140)
GLUCOSE UR STRIP-MCNC: NEGATIVE MG/DL
HCO3 BLDA-SCNC: 27.6 MMOL/L (ref 22–26)
HCO3 BLDA-SCNC: 29.7 MMOL/L (ref 22–26)
HCT VFR BLD AUTO: 26.4 % (ref 36–46)
HCT VFR BLD AUTO: 30.1 % (ref 36–46)
HGB BLD-MCNC: 8.8 G/DL (ref 12–16)
HGB BLD-MCNC: 9.7 G/DL (ref 12–16)
HGB UR QL STRIP.AUTO: NEGATIVE
INR PPP: 0.94 (ref 0.91–1.09)
IPAP: 16
IRON SATN MFR SERPL: 23 %
IRON SERPL-MCNC: 62 UG/DL (ref 50–170)
KETONES UR STRIP-MCNC: NEGATIVE MG/DL
LACTATE SERPL-SCNC: 1.8 MMOL/L (ref 0.7–2)
LEUKOCYTE ESTERASE UR QL STRIP: NEGATIVE
LIPASE SERPL-CCNC: 176 U/L (ref 23–300)
MAGNESIUM SERPL-MCNC: 2.4 MG/DL (ref 1.6–2.3)
MCH RBC QN AUTO: 32.2 PG (ref 26.8–34.3)
MCHC RBC AUTO-ENTMCNC: 32.2 G/DL (ref 31.4–37.4)
MCV RBC AUTO: 100 FL (ref 80–100)
NITRITE UR QL STRIP: NEGATIVE
NON VENT ROOM AIR: 40 %
NON VENT- BIPAP: ABNORMAL
NON VENT- BIPAP: ABNORMAL
NT-PROBNP SERPL-MCNC: ABNORMAL PG/ML (ref 0–299)
O2 CT BLDA-SCNC: 16.8 ML/DL (ref 16–23)
O2 CT BLDA-SCNC: 16.9 ML/DL (ref 16–23)
OXYHGB MFR BLDA: 95.5 % (ref 95–98)
OXYHGB MFR BLDA: 95.5 % (ref 95–98)
PCO2 BLDA: 55 MM HG (ref 35–45)
PCO2 BLDA: 81 MM HG (ref 35–45)
PEEP MAX SETTING VENT: 8 CM[H2O]
PH BLDA: 7.14 [PH] (ref 7.35–7.45)
PH BLDA: 7.34 [PH] (ref 7.35–7.45)
PH UR STRIP.AUTO: 5 [PH]
PHOSPHATE SERPL-MCNC: 4 MG/DL (ref 2.5–4.8)
PHOSPHATE SERPL-MCNC: 7.9 MG/DL (ref 2.5–4.8)
PLATELET # BLD AUTO: 261 THOUSANDS/UL (ref 150–450)
PMV BLD AUTO: 7.5 FL (ref 8.9–12.7)
PO2 BLDA: 127 MM HG (ref 80–105)
PO2 BLDA: 156 MM HG (ref 80–105)
POTASSIUM SERPL-SCNC: 4.3 MMOL/L (ref 3.6–5)
POTASSIUM SERPL-SCNC: 4.9 MMOL/L (ref 3.6–5)
POTASSIUM SERPL-SCNC: 5.2 MMOL/L (ref 3.6–5)
PROCALCITONIN SERPL-MCNC: 0.29 NG/ML
PROT SERPL-MCNC: 7.8 G/DL (ref 5.9–8.4)
PROT UR STRIP-MCNC: NEGATIVE MG/DL
PROTHROMBIN TIME: 10.3 SECONDS (ref 9.8–12)
RBC # BLD AUTO: 3.02 MILLION/UL (ref 4–5.2)
RH BLD: NEGATIVE
SODIUM SERPL-SCNC: 134 MMOL/L (ref 137–147)
SODIUM SERPL-SCNC: 134 MMOL/L (ref 137–147)
SP GR UR STRIP.AUTO: 1.01 (ref 1–1.04)
SPECIMEN EXPIRATION DATE: NORMAL
SPECIMEN SOURCE: ABNORMAL
SPECIMEN SOURCE: ABNORMAL
TIBC SERPL-MCNC: 271 UG/DL (ref 250–450)
TROPONIN I SERPL-MCNC: 3.03 NG/ML (ref 0–0.03)
TROPONIN I SERPL-MCNC: 3.24 NG/ML (ref 0–0.03)
TSH SERPL DL<=0.05 MIU/L-ACNC: 3.36 UIU/ML (ref 0.47–4.68)
UROBILINOGEN UA: NEGATIVE MG/DL
VENT BIPAP FIO2: 60 %
VIT B12 SERPL-MCNC: 727 PG/ML (ref 100–900)
WBC # BLD AUTO: 11.8 THOUSAND/UL (ref 4.31–10.16)

## 2019-10-21 PROCEDURE — 83605 ASSAY OF LACTIC ACID: CPT | Performed by: PHYSICIAN ASSISTANT

## 2019-10-21 PROCEDURE — 82948 REAGENT STRIP/BLOOD GLUCOSE: CPT

## 2019-10-21 PROCEDURE — NC001 PR NO CHARGE: Performed by: PHYSICAL MEDICINE & REHABILITATION

## 2019-10-21 PROCEDURE — 94660 CPAP INITIATION&MGMT: CPT

## 2019-10-21 PROCEDURE — 86901 BLOOD TYPING SEROLOGIC RH(D): CPT | Performed by: PHYSICIAN ASSISTANT

## 2019-10-21 PROCEDURE — 71045 X-RAY EXAM CHEST 1 VIEW: CPT

## 2019-10-21 PROCEDURE — 85730 THROMBOPLASTIN TIME PARTIAL: CPT | Performed by: PHYSICIAN ASSISTANT

## 2019-10-21 PROCEDURE — 84132 ASSAY OF SERUM POTASSIUM: CPT | Performed by: NURSE PRACTITIONER

## 2019-10-21 PROCEDURE — 36600 WITHDRAWAL OF ARTERIAL BLOOD: CPT

## 2019-10-21 PROCEDURE — 93005 ELECTROCARDIOGRAM TRACING: CPT

## 2019-10-21 PROCEDURE — 85018 HEMOGLOBIN: CPT | Performed by: PHYSICAL MEDICINE & REHABILITATION

## 2019-10-21 PROCEDURE — 82746 ASSAY OF FOLIC ACID SERUM: CPT | Performed by: NURSE PRACTITIONER

## 2019-10-21 PROCEDURE — 36556 INSERT NON-TUNNEL CV CATH: CPT | Performed by: PHYSICIAN ASSISTANT

## 2019-10-21 PROCEDURE — 97535 SELF CARE MNGMENT TRAINING: CPT

## 2019-10-21 PROCEDURE — 82306 VITAMIN D 25 HYDROXY: CPT | Performed by: NURSE PRACTITIONER

## 2019-10-21 PROCEDURE — 84443 ASSAY THYROID STIM HORMONE: CPT | Performed by: PHYSICIAN ASSISTANT

## 2019-10-21 PROCEDURE — 70450 CT HEAD/BRAIN W/O DYE: CPT

## 2019-10-21 PROCEDURE — 83690 ASSAY OF LIPASE: CPT | Performed by: PHYSICIAN ASSISTANT

## 2019-10-21 PROCEDURE — 84484 ASSAY OF TROPONIN QUANT: CPT | Performed by: PHYSICIAN ASSISTANT

## 2019-10-21 PROCEDURE — NC001 PR NO CHARGE: Performed by: PHYSICIAN ASSISTANT

## 2019-10-21 PROCEDURE — 99291 CRITICAL CARE FIRST HOUR: CPT | Performed by: NURSE PRACTITIONER

## 2019-10-21 PROCEDURE — 92523 SPEECH SOUND LANG COMPREHEN: CPT

## 2019-10-21 PROCEDURE — 85014 HEMATOCRIT: CPT | Performed by: PHYSICAL MEDICINE & REHABILITATION

## 2019-10-21 PROCEDURE — 86850 RBC ANTIBODY SCREEN: CPT | Performed by: PHYSICIAN ASSISTANT

## 2019-10-21 PROCEDURE — 83540 ASSAY OF IRON: CPT | Performed by: NURSE PRACTITIONER

## 2019-10-21 PROCEDURE — 81003 URINALYSIS AUTO W/O SCOPE: CPT | Performed by: PHYSICIAN ASSISTANT

## 2019-10-21 PROCEDURE — 82607 VITAMIN B-12: CPT | Performed by: NURSE PRACTITIONER

## 2019-10-21 PROCEDURE — 85610 PROTHROMBIN TIME: CPT | Performed by: PHYSICIAN ASSISTANT

## 2019-10-21 PROCEDURE — 99232 SBSQ HOSP IP/OBS MODERATE 35: CPT | Performed by: NURSE PRACTITIONER

## 2019-10-21 PROCEDURE — 94760 N-INVAS EAR/PLS OXIMETRY 1: CPT

## 2019-10-21 PROCEDURE — 86900 BLOOD TYPING SEROLOGIC ABO: CPT | Performed by: PHYSICIAN ASSISTANT

## 2019-10-21 PROCEDURE — 87040 BLOOD CULTURE FOR BACTERIA: CPT | Performed by: PHYSICIAN ASSISTANT

## 2019-10-21 PROCEDURE — 97530 THERAPEUTIC ACTIVITIES: CPT

## 2019-10-21 PROCEDURE — 84100 ASSAY OF PHOSPHORUS: CPT | Performed by: PHYSICIAN ASSISTANT

## 2019-10-21 PROCEDURE — 87449 NOS EACH ORGANISM AG IA: CPT | Performed by: PHYSICIAN ASSISTANT

## 2019-10-21 PROCEDURE — 87502 INFLUENZA DNA AMP PROBE: CPT | Performed by: PHYSICIAN ASSISTANT

## 2019-10-21 PROCEDURE — 80048 BASIC METABOLIC PNL TOTAL CA: CPT | Performed by: PHYSICAL MEDICINE & REHABILITATION

## 2019-10-21 PROCEDURE — 85027 COMPLETE CBC AUTOMATED: CPT | Performed by: PHYSICIAN ASSISTANT

## 2019-10-21 PROCEDURE — 83550 IRON BINDING TEST: CPT | Performed by: NURSE PRACTITIONER

## 2019-10-21 PROCEDURE — 02HV33Z INSERTION OF INFUSION DEVICE INTO SUPERIOR VENA CAVA, PERCUTANEOUS APPROACH: ICD-10-PCS | Performed by: RADIOLOGY

## 2019-10-21 PROCEDURE — 83735 ASSAY OF MAGNESIUM: CPT | Performed by: PHYSICIAN ASSISTANT

## 2019-10-21 PROCEDURE — 85379 FIBRIN DEGRADATION QUANT: CPT | Performed by: PHYSICIAN ASSISTANT

## 2019-10-21 PROCEDURE — 94640 AIRWAY INHALATION TREATMENT: CPT

## 2019-10-21 PROCEDURE — 99233 SBSQ HOSP IP/OBS HIGH 50: CPT | Performed by: FAMILY MEDICINE

## 2019-10-21 PROCEDURE — 84100 ASSAY OF PHOSPHORUS: CPT | Performed by: NURSE PRACTITIONER

## 2019-10-21 PROCEDURE — 83880 ASSAY OF NATRIURETIC PEPTIDE: CPT | Performed by: PHYSICAL MEDICINE & REHABILITATION

## 2019-10-21 PROCEDURE — 84145 PROCALCITONIN (PCT): CPT | Performed by: PHYSICIAN ASSISTANT

## 2019-10-21 PROCEDURE — 82728 ASSAY OF FERRITIN: CPT | Performed by: NURSE PRACTITIONER

## 2019-10-21 PROCEDURE — 80053 COMPREHEN METABOLIC PANEL: CPT | Performed by: PHYSICIAN ASSISTANT

## 2019-10-21 PROCEDURE — 82805 BLOOD GASES W/O2 SATURATION: CPT | Performed by: PHYSICIAN ASSISTANT

## 2019-10-21 PROCEDURE — 99239 HOSP IP/OBS DSCHRG MGMT >30: CPT | Performed by: PHYSICAL MEDICINE & REHABILITATION

## 2019-10-21 RX ORDER — POLYETHYLENE GLYCOL 3350 17 G/17G
17 POWDER, FOR SOLUTION ORAL DAILY PRN
Status: CANCELLED | OUTPATIENT
Start: 2019-10-21

## 2019-10-21 RX ORDER — NYSTATIN 100000 [USP'U]/G
POWDER TOPICAL 2 TIMES DAILY
Status: DISCONTINUED | OUTPATIENT
Start: 2019-10-21 | End: 2019-10-28 | Stop reason: HOSPADM

## 2019-10-21 RX ORDER — HEPARIN SODIUM 5000 [USP'U]/ML
5000 INJECTION, SOLUTION INTRAVENOUS; SUBCUTANEOUS EVERY 8 HOURS SCHEDULED
Status: DISCONTINUED | OUTPATIENT
Start: 2019-10-21 | End: 2019-10-21

## 2019-10-21 RX ORDER — POLYETHYLENE GLYCOL 3350 17 G/17G
17 POWDER, FOR SOLUTION ORAL DAILY PRN
Status: DISCONTINUED | OUTPATIENT
Start: 2019-10-21 | End: 2019-10-28 | Stop reason: HOSPADM

## 2019-10-21 RX ORDER — CEFEPIME HYDROCHLORIDE 2 G/50ML
2000 INJECTION, SOLUTION INTRAVENOUS EVERY 24 HOURS
Status: DISCONTINUED | OUTPATIENT
Start: 2019-10-21 | End: 2019-10-22

## 2019-10-21 RX ORDER — GABAPENTIN 300 MG/1
300 CAPSULE ORAL 3 TIMES DAILY
Status: DISCONTINUED | OUTPATIENT
Start: 2019-10-21 | End: 2019-10-21

## 2019-10-21 RX ORDER — FUROSEMIDE 10 MG/ML
40 INJECTION INTRAMUSCULAR; INTRAVENOUS ONCE
Status: COMPLETED | OUTPATIENT
Start: 2019-10-22 | End: 2019-10-22

## 2019-10-21 RX ORDER — METOPROLOL TARTRATE 5 MG/5ML
2.5 INJECTION INTRAVENOUS EVERY 12 HOURS
Status: DISCONTINUED | OUTPATIENT
Start: 2019-10-21 | End: 2019-10-22

## 2019-10-21 RX ORDER — FUROSEMIDE 40 MG/1
80 TABLET ORAL
Status: CANCELLED | OUTPATIENT
Start: 2019-10-21

## 2019-10-21 RX ORDER — FUROSEMIDE 40 MG/1
80 TABLET ORAL
Status: DISCONTINUED | OUTPATIENT
Start: 2019-10-21 | End: 2019-10-21 | Stop reason: HOSPADM

## 2019-10-21 RX ORDER — LIDOCAINE 50 MG/G
1 PATCH TOPICAL DAILY PRN
Status: DISCONTINUED | OUTPATIENT
Start: 2019-10-21 | End: 2019-10-21

## 2019-10-21 RX ORDER — FUROSEMIDE 10 MG/ML
80 INJECTION INTRAMUSCULAR; INTRAVENOUS ONCE
Status: COMPLETED | OUTPATIENT
Start: 2019-10-21 | End: 2019-10-21

## 2019-10-21 RX ORDER — METHOCARBAMOL 500 MG/1
500 TABLET, FILM COATED ORAL EVERY 8 HOURS PRN
Status: CANCELLED | OUTPATIENT
Start: 2019-10-21

## 2019-10-21 RX ORDER — FUROSEMIDE 10 MG/ML
INJECTION INTRAMUSCULAR; INTRAVENOUS
Status: COMPLETED
Start: 2019-10-21 | End: 2019-10-21

## 2019-10-21 RX ORDER — NITROGLYCERIN 20 MG/100ML
5-200 INJECTION INTRAVENOUS
Status: DISCONTINUED | OUTPATIENT
Start: 2019-10-21 | End: 2019-10-22

## 2019-10-21 RX ORDER — CHLORHEXIDINE GLUCONATE 0.12 MG/ML
15 RINSE ORAL EVERY 12 HOURS SCHEDULED
Status: DISCONTINUED | OUTPATIENT
Start: 2019-10-21 | End: 2019-10-21

## 2019-10-21 RX ORDER — ALBUTEROL SULFATE 90 UG/1
2 AEROSOL, METERED RESPIRATORY (INHALATION) EVERY 4 HOURS PRN
Status: DISCONTINUED | OUTPATIENT
Start: 2019-10-21 | End: 2019-10-21

## 2019-10-21 RX ORDER — LEVOTHYROXINE SODIUM 112 UG/1
112 TABLET ORAL DAILY
Status: DISCONTINUED | OUTPATIENT
Start: 2019-10-22 | End: 2019-10-28 | Stop reason: HOSPADM

## 2019-10-21 RX ORDER — GABAPENTIN 300 MG/1
300 CAPSULE ORAL 3 TIMES DAILY
Status: CANCELLED | OUTPATIENT
Start: 2019-10-21

## 2019-10-21 RX ORDER — METHYLPREDNISOLONE SODIUM SUCCINATE 125 MG/2ML
125 INJECTION, POWDER, LYOPHILIZED, FOR SOLUTION INTRAMUSCULAR; INTRAVENOUS ONCE
Status: COMPLETED | OUTPATIENT
Start: 2019-10-21 | End: 2019-10-21

## 2019-10-21 RX ORDER — FUROSEMIDE 40 MG/1
80 TABLET ORAL
Status: DISCONTINUED | OUTPATIENT
Start: 2019-10-21 | End: 2019-10-21

## 2019-10-21 RX ORDER — LEVOTHYROXINE SODIUM 112 UG/1
112 TABLET ORAL DAILY
Status: CANCELLED | OUTPATIENT
Start: 2019-10-22

## 2019-10-21 RX ORDER — LEVALBUTEROL 1.25 MG/.5ML
SOLUTION, CONCENTRATE RESPIRATORY (INHALATION)
Status: DISPENSED
Start: 2019-10-21 | End: 2019-10-22

## 2019-10-21 RX ORDER — ALBUTEROL SULFATE 90 UG/1
2 AEROSOL, METERED RESPIRATORY (INHALATION) EVERY 4 HOURS PRN
Status: CANCELLED | OUTPATIENT
Start: 2019-10-21

## 2019-10-21 RX ORDER — ACETAMINOPHEN 325 MG/1
650 TABLET ORAL EVERY 6 HOURS PRN
Status: CANCELLED | OUTPATIENT
Start: 2019-10-21

## 2019-10-21 RX ORDER — ACETAMINOPHEN 325 MG/1
650 TABLET ORAL EVERY 6 HOURS PRN
Status: DISCONTINUED | OUTPATIENT
Start: 2019-10-21 | End: 2019-10-22 | Stop reason: SDUPTHER

## 2019-10-21 RX ORDER — LIDOCAINE 50 MG/G
1 PATCH TOPICAL DAILY PRN
Status: CANCELLED | OUTPATIENT
Start: 2019-10-21

## 2019-10-21 RX ORDER — FUROSEMIDE 10 MG/ML
40 INJECTION INTRAMUSCULAR; INTRAVENOUS ONCE
Status: COMPLETED | OUTPATIENT
Start: 2019-10-21 | End: 2019-10-21

## 2019-10-21 RX ORDER — NYSTATIN 100000 [USP'U]/G
POWDER TOPICAL 2 TIMES DAILY
Status: CANCELLED | OUTPATIENT
Start: 2019-10-21

## 2019-10-21 RX ORDER — AMLODIPINE BESYLATE 5 MG/1
5 TABLET ORAL EVERY MORNING
Status: CANCELLED | OUTPATIENT
Start: 2019-10-22

## 2019-10-21 RX ORDER — AMLODIPINE BESYLATE 5 MG/1
5 TABLET ORAL EVERY MORNING
Status: DISCONTINUED | OUTPATIENT
Start: 2019-10-22 | End: 2019-10-21

## 2019-10-21 RX ORDER — METHOCARBAMOL 500 MG/1
500 TABLET, FILM COATED ORAL EVERY 8 HOURS PRN
Status: DISCONTINUED | OUTPATIENT
Start: 2019-10-21 | End: 2019-10-21

## 2019-10-21 RX ORDER — VANCOMYCIN HYDROCHLORIDE 1 G/200ML
15 INJECTION, SOLUTION INTRAVENOUS EVERY 24 HOURS
Status: DISCONTINUED | OUTPATIENT
Start: 2019-10-21 | End: 2019-10-22

## 2019-10-21 RX ORDER — LEVALBUTEROL INHALATION SOLUTION 1.25 MG/3ML
1.25 SOLUTION RESPIRATORY (INHALATION)
Status: DISCONTINUED | OUTPATIENT
Start: 2019-10-21 | End: 2019-10-22

## 2019-10-21 RX ADMIN — IPRATROPIUM BROMIDE 0.5 MG: 0.5 SOLUTION RESPIRATORY (INHALATION) at 19:34

## 2019-10-21 RX ADMIN — LIDOCAINE 1 PATCH: 50 PATCH CUTANEOUS at 08:03

## 2019-10-21 RX ADMIN — METOPROLOL TARTRATE 25 MG: 25 TABLET ORAL at 08:00

## 2019-10-21 RX ADMIN — METOPROLOL TARTRATE 2.5 MG: 5 INJECTION INTRAVENOUS at 22:05

## 2019-10-21 RX ADMIN — METHYLPREDNISOLONE SODIUM SUCCINATE 125 MG: 125 INJECTION, POWDER, FOR SOLUTION INTRAMUSCULAR; INTRAVENOUS at 19:03

## 2019-10-21 RX ADMIN — GABAPENTIN 300 MG: 300 CAPSULE ORAL at 15:51

## 2019-10-21 RX ADMIN — CEFEPIME HYDROCHLORIDE 2000 MG: 2 INJECTION, SOLUTION INTRAVENOUS at 19:23

## 2019-10-21 RX ADMIN — LEVALBUTEROL HYDROCHLORIDE 1.25 MG: 1.25 SOLUTION RESPIRATORY (INHALATION) at 19:35

## 2019-10-21 RX ADMIN — ALBUTEROL SULFATE 2 PUFF: 90 AEROSOL, METERED RESPIRATORY (INHALATION) at 02:44

## 2019-10-21 RX ADMIN — FUROSEMIDE 40 MG: 10 INJECTION INTRAMUSCULAR; INTRAVENOUS at 17:17

## 2019-10-21 RX ADMIN — FUROSEMIDE 80 MG: 10 INJECTION, SOLUTION INTRAVENOUS at 18:07

## 2019-10-21 RX ADMIN — VANCOMYCIN HYDROCHLORIDE 1000 MG: 1 INJECTION, SOLUTION INTRAVENOUS at 20:17

## 2019-10-21 RX ADMIN — GABAPENTIN 300 MG: 300 CAPSULE ORAL at 08:02

## 2019-10-21 RX ADMIN — METHOCARBAMOL 500 MG: 500 TABLET ORAL at 12:30

## 2019-10-21 RX ADMIN — METHOCARBAMOL 500 MG: 500 TABLET ORAL at 02:44

## 2019-10-21 RX ADMIN — FUROSEMIDE 80 MG: 40 TABLET ORAL at 12:34

## 2019-10-21 RX ADMIN — ALBUTEROL SULFATE 2 PUFF: 90 AEROSOL, METERED RESPIRATORY (INHALATION) at 15:52

## 2019-10-21 RX ADMIN — NITROGLYCERIN 5 MCG/MIN: 20 INJECTION INTRAVENOUS at 18:09

## 2019-10-21 RX ADMIN — LEVOTHYROXINE SODIUM 112 MCG: 112 TABLET ORAL at 05:34

## 2019-10-21 RX ADMIN — NYSTATIN: 100000 POWDER TOPICAL at 08:08

## 2019-10-21 RX ADMIN — AZITHROMYCIN MONOHYDRATE 500 MG: 500 INJECTION, POWDER, LYOPHILIZED, FOR SOLUTION INTRAVENOUS at 21:57

## 2019-10-21 RX ADMIN — ALBUTEROL SULFATE 2 PUFF: 90 AEROSOL, METERED RESPIRATORY (INHALATION) at 08:09

## 2019-10-21 RX ADMIN — AMLODIPINE BESYLATE 5 MG: 5 TABLET ORAL at 08:02

## 2019-10-21 RX ADMIN — FUROSEMIDE 40 MG: 10 INJECTION, SOLUTION INTRAVENOUS at 17:17

## 2019-10-21 RX ADMIN — ALBUTEROL SULFATE 2.5 MG: 2.5 SOLUTION RESPIRATORY (INHALATION) at 11:21

## 2019-10-21 RX ADMIN — ACETAMINOPHEN 650 MG: 325 TABLET ORAL at 10:08

## 2019-10-21 RX ADMIN — FAMOTIDINE 20 MG: 10 INJECTION, SOLUTION INTRAVENOUS at 21:57

## 2019-10-21 RX ADMIN — ACETAMINOPHEN 650 MG: 325 TABLET ORAL at 04:10

## 2019-10-21 NOTE — ASSESSMENT & PLAN NOTE
· Currently appears to be at her baseline, awake alert oriented x3 with no overt deficits noted  · Continue scheduled neurological checks  · ASA and Plavix currently on hold given recent subdural hematoma

## 2019-10-21 NOTE — ASSESSMENT & PLAN NOTE
- per EMR present going back to 01/2019  - currently 8 8 improving, continue to trend  - patient reports iron deficiency anemia   Iron panel, B12 and folate wnl

## 2019-10-21 NOTE — ASSESSMENT & PLAN NOTE
· Noted on chart review to have chronic renal insufficiency with creatinine baseline appearing to be about 1 5-1 6  · Creatinine this morning 1 41 -- stat labs pending  · Heller in place for accurate I&O's   · Nephrology following, who placed patient on Lasix today -- will need to be seen by Nephrology given acute change in clinical status  · Avoid nephrotoxic agents  · Trend renal indices on serial BMPs

## 2019-10-21 NOTE — TELEPHONE ENCOUNTER
Message received from [facility] acute rehab stating pt's CT will be done at [address] [place] [address] for upcoming 10/25 appt

## 2019-10-21 NOTE — ASSESSMENT & PLAN NOTE
Lab Results   Component Value Date    HGBA1C 5 0 08/16/2019       Recent Labs     10/21/19  0650 10/21/19  1735   POCGLU 103 373*       Blood Sugar Average: Last 72 hrs:       · HbA1c on 08/16/2019 -- 5  · Sinus scale insulin with fingerstick blood glucose q 6 hours for NPO status to maintain blood sugar 140-180

## 2019-10-21 NOTE — PLAN OF CARE
Problem: Potential for Falls  Goal: Patient will remain free of falls  Description  INTERVENTIONS:  - Assess patient frequently for physical needs  -  Identify cognitive and physical deficits and behaviors that affect risk of falls    -  [place] fall precautions as indicated by assessment   - Educate patient/family on patient safety including physical limitations  - Instruct patient to call for assistance with activity based on assessment  - Modify environment to reduce risk of injury  - Consider OT/PT consult to assist with strengthening/mobility  Outcome: Progressing     Problem: Prexisting or High Potential for Compromised Skin Integrity  Goal: Skin integrity is maintained or improved  Description  INTERVENTIONS:  - Identify patients at risk for skin breakdown  - Assess and monitor skin integrity  - Assess and monitor nutrition and hydration status  - Monitor labs   - Assess for incontinence   - Turn and reposition patient  - Assist with mobility/ambulation  - Relieve pressure over bony prominences  - Avoid friction and shearing  - Provide appropriate hygiene as needed including keeping skin clean and dry  - Evaluate need for skin moisturizer/barrier cream  - Collaborate with interdisciplinary team   - Patient/family teaching  - Consider wound care consult   Outcome: Progressing     Problem: PAIN - ADULT  Goal: Verbalizes/displays adequate comfort level or baseline comfort level  Description  Interventions:  - Encourage patient to monitor pain and request assistance  - Assess pain using appropriate pain scale  - Administer analgesics based on type and severity of pain and evaluate response  - Implement non-pharmacological measures as appropriate and evaluate response  - Consider cultural and social influences on pain and pain management  - Notify physician/advanced practitioner if interventions unsuccessful or patient reports new pain  Outcome: Progressing     Problem: INFECTION - ADULT  Goal: Absence or prevention of progression during hospitalization  Description  INTERVENTIONS:  - Assess and monitor for signs and symptoms of infection  - Monitor lab/diagnostic results  - Monitor all insertion sites, i e  indwelling lines, tubes, and drains  - Monitor endotracheal if appropriate and nasal secretions for changes in amount and color  - [place] appropriate cooling/warming therapies per order  - Administer medications as ordered  - Instruct and encourage patient and family to use good hand hygiene technique  - Identify and instruct in appropriate isolation precautions for identified infection/condition  Outcome: Progressing  Goal: Absence of fever/infection during neutropenic period  Description  INTERVENTIONS:  - Monitor WBC    Outcome: Progressing     Problem: SAFETY ADULT  Goal: Maintain or return to baseline ADL function  Description  INTERVENTIONS:  -  Assess patient's ability to carry out ADLs; assess patient's baseline for ADL function and identify physical deficits which impact ability to perform ADLs (bathing, care of mouth/teeth, toileting, grooming, dressing, etc )  - Assess/evaluate cause of self-care deficits   - Assess range of motion  - Assess patient's mobility; develop plan if impaired  - Assess patient's need for assistive devices and provide as appropriate  - Encourage maximum independence but intervene and supervise when necessary  - Involve family in performance of ADLs  - Assess for home care needs following discharge   - Consider OT consult to assist with ADL evaluation and planning for discharge  - Provide patient education as appropriate  Outcome: Progressing  Goal: Maintain or return mobility status to optimal level  Description  INTERVENTIONS:  - Assess patient's baseline mobility status (ambulation, transfers, stairs, etc )    - Identify cognitive and physical deficits and behaviors that affect mobility  - Identify mobility aids required to assist with transfers and/or ambulation (gait belt, sit-to-stand, lift, walker, cane, etc )  - [place] fall precautions as indicated by assessment  - Record patient progress and toleration of activity level on Mobility SBAR; progress patient to next Phase/Stage  - Instruct patient to call for assistance with activity based on assessment  - Consider rehabilitation consult to assist with strengthening/weightbearing, etc   Outcome: Progressing     Problem: DISCHARGE PLANNING  Goal: Discharge to home or other facility with appropriate resources  Description  INTERVENTIONS:  - Identify barriers to discharge w/patient and caregiver  - Arrange for needed discharge resources and transportation as appropriate  - Identify discharge learning needs (meds, wound care, etc )  - Arrange for interpretive services to assist at discharge as needed  - Refer to Case Management Department for coordinating discharge planning if the patient needs post-hospital services based on physician/advanced practitioner order or complex needs related to functional status, cognitive ability, or social support system  Outcome: Progressing     Problem: Nutrition/Hydration-ADULT  Goal: Nutrient/Hydration intake appropriate for improving, restoring or maintaining nutritional needs  Description  Monitor and assess patient's nutrition/hydration status for malnutrition  Collaborate with interdisciplinary team and initiate plan and interventions as ordered  Monitor patient's weight and dietary intake as ordered or per policy  Utilize nutrition screening tool and intervene as necessary  Determine patient's food preferences and provide high-protein, high-caloric foods as appropriate       INTERVENTIONS:  - Monitor oral intake, urinary output, labs, and treatment plans  - Assess nutrition and hydration status and recommend course of action  - Evaluate amount of meals eaten  - Assist patient with eating if necessary   - Allow adequate time for meals  - Recommend/ encourage appropriate diets, oral nutritional supplements, and vitamin/mineral supplements  - Order, calculate, and assess calorie counts as needed  - Recommend, monitor, and adjust tube feedings and TPN/PPN based on assessed needs  - Assess need for intravenous fluids  - Provide specific nutrition/hydration education as appropriate  - Include patient/family/caregiver in decisions related to nutrition  Outcome: Progressing

## 2019-10-21 NOTE — ASSESSMENT & PLAN NOTE
- baseline appears to be 1 0-1 2 however has not been below 1 5 since August   - currently 1 41 - torsemide switched to lasix 80mg bid due to pt refusing torsemide due to diarrhea  - recheck on 10/24  - nephrology follow patient:  ? 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 Check renal ultrasound for completeness  ?  Check vitamin-D level

## 2019-10-21 NOTE — ASSESSMENT & PLAN NOTE
Wt Readings from Last 3 Encounters:   10/21/19 87 5 kg (192 lb 14 4 oz)   10/17/19 86 2 kg (190 lb 0 6 oz)   10/10/19 84 3 kg (185 lb 14 4 oz)         · Grade 2 diastolic heart failure noted on previous echo in August 2019  · Repeat echo pending  · Current acute on chronic respiratory failure necessitating transfer to ICU and placed on BiPAP likely attributable to worsening of this chronic diastolic heart failure  · Initially given 40 Lasix x1 today by Nephrology, additional 80 Lasix IV x1 provided on admission ICU  · Plan on monitoring I&O's closely with 24 hour fluid goal of net -1 L

## 2019-10-21 NOTE — ASSESSMENT & PLAN NOTE
Wt Readings from Last 3 Encounters:   10/21/19 87 5 kg (192 lb 14 4 oz)   10/17/19 86 2 kg (190 lb 0 6 oz)   10/10/19 84 3 kg (185 lb 14 4 oz)     -fluid overloaded monitor on lasix 80mg bid   - grade 2   - on chest XR of 10/17 vascular congestion and small b/l pleural effusions  - STAT CXR this morning   FINDINGS:  There are median sternotomy wires indicating prior cardiac surgery  The cardiac silhouette is enlarged  Hazy density at the left lung base is new suggesting effusion and/or consolidation  Slight blunting of the right costophrenic angle is seen  Interstitial prominence is unchanged  Right airspace opacity has resolved    Osseous structures appear within normal limits for patient age    IMPRESSION:  New small effusions with possible adjacent airspace consolidation in the left lung base      - BNP elevated 26, 100   - trace b/l le edema  - refused toresmide 40 this am, nephrology switched to lasix 80mg bid, first dose given with lunch  - hold off on O2 if patient o2 sat >93 %  -hold off on benzos at this time    Monitor closely

## 2019-10-21 NOTE — PROGRESS NOTES
10/21/19 1000   Pain Assessment   Pain Assessment 0-10   Pain Score 8   Pain Type Chronic pain   Pain Location Generalized   Pain Orientation Right   Pain Radiating Towards shoulders   Pain Descriptors Aching   Pain Frequency Constant/continuous   Pain Onset Ongoing   Clinical Progression Gradually improving   Effect of Pain on Daily Activities impacting functional ADL transfers   Patient's Stated Pain Goal No pain   Hospital Pain Intervention(s) Relaxation technique; Rest   Response to Interventions tolerated   Restrictions/Precautions   Precautions Fall Risk;Bed/chair alarms;O2;Pain;Supervision on toilet/commode  (2L)   Weight Bearing Restrictions No   ROM Restrictions No   Lifestyle   Autonomy "I am wheezing a lot today"   Sit to Stand   Type of Assistance Needed Physical assistance   Amount of Physical Assistance Provided 50%-74%   Comment Pt requiring modA/maxA to perform sit<>stand on this day 2* to increase pain and increased wheezing/SOB  Pt currently on 2L of O2  Sit to Stand CARE Score 2   Bed-Chair Transfer   Type of Assistance Needed Physical assistance   Amount of Physical Assistance Provided Total assistance   Comment Pt requiring Ax2 on this 2* to increased faitgue and increaed wheezing  Ax2 for safety  Pt demosntrating significant decline in functional performance from previous session   Chair/Bed-to-Chair Transfer CARE Score 1   Transfer Bed/Chair/Wheelchair   Positioning Concerns Skin Integrity   Limitations Noted In Balance; Coordination; Endurance;UE Strength;LE Strength   Adaptive Equipment Roller Walker   Findings see above   Toileting Hygiene   Type of Assistance Needed Physical assistance   Amount of Physical Assistance Provided Total assistance   Comment Pt able tu use grabar to stand  however, TA for hygiene 2* to increased fatigue   Toileting Hygiene CARE Score 1   Toileting   Able to [address] down no, up no     Able to Manage Clothing Closures No   Manage Hygiene Bowel   Limitations Noted In Balance; Coordination;UE Strength;LE Strength   Adaptive Equipment Grab Bar   Toilet Transfer   Type of Assistance Needed Physical assistance   Amount of Physical Assistance Provided 50%-74%   Comment Pt requirng modA on this day secondary to increased wheezing and fatigue  Toilet Transfer CARE Score 2   Toilet Transfer   Surface Assessed Raised Toilet   Transfer Technique Stand Pivot   Limitations Noted In Balance; Endurance;UE Strength;LE Strength   Adaptive Equipment Grab Bar   Findings see above   Cognition   Overall Cognitive Status Impaired   Arousal/Participation Alert; Cooperative   Attention Within functional limits   Orientation Level Oriented X4   Memory Decreased short term memory;Decreased recall of recent events   Following Commands Follows one step commands with increased time or repetition   Activity Tolerance   Activity Tolerance Patient limited by fatigue; Other (Comment)  (increased wheezing)   Medical Staff Made Aware nurse practitioner Kiersten Allen and RN Jana Ferrara   Assessment   Treatment Assessment Pt scheduled for 60mins of skilled OT services on this, however, pt able to be seen for 30mins 2* to increased fatigue and significant wheezing impacting functional performance  Nurse practitioner entered pts room and recommended pt to be on medical hold until pts breathing is normalized  Pt currently on 2L of O2, stats at 100%, pt not on oxygen day before  Skilled OT services focused on comfortably setting pt up in recliner chair for optimal positioning and comfort with legs elevated  At end of session, respiratory entering to provide breathing tx/medication  Therapist to assess further and see if pt appropriate for tx session later this afternoon  Prognosis Fair   Problem List Decreased strength;Decreased endurance; Impaired balance;Decreased mobility   Plan   Treatment/Interventions ADL retraining;Functional transfer training; Therapeutic exercise; Endurance training;Patient/family training; Bed mobility; Compensatory technique education   Progress Progressing toward goals   OT Therapy Minutes   OT Time In 1000   OT Time Out 1030   OT Total Time (minutes) 30   OT Mode of treatment - Individual (minutes) 30   OT Mode of treatment - Concurrent (minutes) 0   OT Mode of treatment - Group (minutes) 0   OT Mode of treatment - Co-treat (minutes) 0   OT Mode of Treatment - Total time(minutes) 30 minutes   OT Cumulative Minutes 120   Therapy Time missed   Time missed?  Yes   Amount of time missed 30   Reason for time missed Medical hold  (increased wheezing)   Time(s) multiple attempts made 2

## 2019-10-21 NOTE — ASSESSMENT & PLAN NOTE
Amlodipine 5mg daily  Metoprolol tartrate 25mg bid   Previously on torsemide 40mg daily but refused by pt due to diarrhea   Changed to lasix 80mg bid by nephrology team    BP prior to diuretic today minimally elevated today 149/78 will continue to monitor while on lasix

## 2019-10-21 NOTE — ASSESSMENT & PLAN NOTE
· Transferred to the ICU this evening from rehab after notably increased work of breathing, shortness of breath, hypoxia, conversational dyspnea  · Notably decreased lung sounds throughout bilaterally  · ABG -- pH 7 1, CO2 81, pO2 156, HCO3 27 6, BE -3 1  · Placed on BiPAP with mild improvement in work of breathing  · On chart review, this appears to be likely related to her acute on chronic diastolic heart failure, which appears to be worse than baseline  · BNP earlier today over 26,000  · Stat portable chest x-ray pending  · Initially given 40 Lasix x1 today, on admission to ICU given additional 80 Lasix x1  · Monitor I&O's   · NPO while on BiPAP and in respiratory distress  · Fluid goal for 24 hours at -1 L  · Started on nitro drip -- titratable with goal of systolic blood pressure less than 140  · Cardiac monitoring  · Obtain stat EKG  · Stat labs pending  · Pulmonary toileting as able  · Stat blood cultures x2, check strep/legionella  · Solu-Medrol 125 mg IV x1 given  · Scheduled Xopenex, Atrovent  · Start broad-spectrum antibiotic therapy with Zithromax, ceftriaxone, vancomycin

## 2019-10-21 NOTE — PCC SPEECH THERAPY
Pt completing formalized cognitive assessment, RIPA: G-2, where pt's overall percentile rank and composite index score when compared to age matched peers 80+ years deems pt to demonstrate cognitive linguistic skills to be WNL at this time  Pt was oriented x4 and when engaging in initial interview, pt's LTM biographical recall was WNL  Overall, pt is noted to demonstrate cognitive linguistic skills at premorbid level of functioning prior to hospitalizations  At this time, no further cognitive linguistic skills are warranted at this time, but pt will benefit from continued OT/PT services to maximize overall functional mobility  Please reconsult if any cognitive deficits present during pt's rehab stay

## 2019-10-21 NOTE — PLAN OF CARE
Problem: Potential for Falls  Goal: Patient will remain free of falls  Description  INTERVENTIONS:  - Assess patient frequently for physical needs  -  Identify cognitive and physical deficits and behaviors that affect risk of falls    -  [place] fall precautions as indicated by assessment   - Educate patient/family on patient safety including physical limitations  - Instruct patient to call for assistance with activity based on assessment  - Modify environment to reduce risk of injury  - Consider OT/PT consult to assist with strengthening/mobility  Outcome: Progressing     Problem: Prexisting or High Potential for Compromised Skin Integrity  Goal: Skin integrity is maintained or improved  Description  INTERVENTIONS:  - Identify patients at risk for skin breakdown  - Assess and monitor skin integrity  - Assess and monitor nutrition and hydration status  - Monitor labs   - Assess for incontinence   - Turn and reposition patient  - Assist with mobility/ambulation  - Relieve pressure over bony prominences  - Avoid friction and shearing  - Provide appropriate hygiene as needed including keeping skin clean and dry  - Evaluate need for skin moisturizer/barrier cream  - Collaborate with interdisciplinary team   - Patient/family teaching  - Consider wound care consult   Outcome: Progressing     Problem: PAIN - ADULT  Goal: Verbalizes/displays adequate comfort level or baseline comfort level  Description  Interventions:  - Encourage patient to monitor pain and request assistance  - Assess pain using appropriate pain scale  - Administer analgesics based on type and severity of pain and evaluate response  - Implement non-pharmacological measures as appropriate and evaluate response  - Consider cultural and social influences on pain and pain management  - Notify physician/advanced practitioner if interventions unsuccessful or patient reports new pain  Outcome: Progressing     Problem: INFECTION - ADULT  Goal: Absence or prevention of progression during hospitalization  Description  INTERVENTIONS:  - Assess and monitor for signs and symptoms of infection  - Monitor lab/diagnostic results  - Monitor all insertion sites, i e  indwelling lines, tubes, and drains  - Monitor endotracheal if appropriate and nasal secretions for changes in amount and color  - [place] appropriate cooling/warming therapies per order  - Administer medications as ordered  - Instruct and encourage patient and family to use good hand hygiene technique  - Identify and instruct in appropriate isolation precautions for identified infection/condition  Outcome: Progressing  Goal: Absence of fever/infection during neutropenic period  Description  INTERVENTIONS:  - Monitor WBC    Outcome: Progressing     Problem: SAFETY ADULT  Goal: Maintain or return to baseline ADL function  Description  INTERVENTIONS:  -  Assess patient's ability to carry out ADLs; assess patient's baseline for ADL function and identify physical deficits which impact ability to perform ADLs (bathing, care of mouth/teeth, toileting, grooming, dressing, etc )  - Assess/evaluate cause of self-care deficits   - Assess range of motion  - Assess patient's mobility; develop plan if impaired  - Assess patient's need for assistive devices and provide as appropriate  - Encourage maximum independence but intervene and supervise when necessary  - Involve family in performance of ADLs  - Assess for home care needs following discharge   - Consider OT consult to assist with ADL evaluation and planning for discharge  - Provide patient education as appropriate  Outcome: Progressing  Goal: Maintain or return mobility status to optimal level  Description  INTERVENTIONS:  - Assess patient's baseline mobility status (ambulation, transfers, stairs, etc )    - Identify cognitive and physical deficits and behaviors that affect mobility  - Identify mobility aids required to assist with transfers and/or ambulation (gait belt, sit-to-stand, lift, walker, cane, etc )  - [place] fall precautions as indicated by assessment  - Record patient progress and toleration of activity level on Mobility SBAR; progress patient to next Phase/Stage  - Instruct patient to call for assistance with activity based on assessment  - Consider rehabilitation consult to assist with strengthening/weightbearing, etc   Outcome: Progressing     Problem: DISCHARGE PLANNING  Goal: Discharge to home or other facility with appropriate resources  Description  INTERVENTIONS:  - Identify barriers to discharge w/patient and caregiver  - Arrange for needed discharge resources and transportation as appropriate  - Identify discharge learning needs (meds, wound care, etc )  - Arrange for interpretive services to assist at discharge as needed  - Refer to Case Management Department for coordinating discharge planning if the patient needs post-hospital services based on physician/advanced practitioner order or complex needs related to functional status, cognitive ability, or social support system  Outcome: Progressing

## 2019-10-21 NOTE — PROGRESS NOTES
Therapist spoke with NP Salome Guo) to assess pts current level 2* to increased wheezing this AM  NP reporting pt continuing to be on med hold until breathing normalizes

## 2019-10-21 NOTE — ASSESSMENT & PLAN NOTE
- home plavix on hold due to extra-axial hemorrhage, per neurosx consult of 10/9 to be held for 2 wks until FU CT at which time neurosx will decide if plavix may be resumed  - intolerant to statins per EMR   - continue to monitor BP - slightly elevated today after pt refused torsemide, notified renal, awaiting possible change to lasix

## 2019-10-21 NOTE — H&P
H&P- Audelia Mackay 1934, 80 y o  female MRN: [id number]    Unit/Bed#:  Encounter: [id number]    Primary Care Provider: Dima Delgado DO   Date and time admitted to hospital: 10/21/2019  5:07 PM        * Acute on chronic respiratory failure with hypoxia ([facility] )  Assessment & Plan  · Transferred to the ICU this evening from rehab after notably increased work of breathing, shortness of breath, hypoxia, conversational dyspnea  · Notably decreased lung sounds throughout bilaterally  · ABG -- pH 7 1, CO2 81, pO2 156, HCO3 27 6, BE -3 1  · Placed on BiPAP with mild improvement in work of breathing  · On chart review, this appears to be likely related to her acute on chronic diastolic heart failure, which appears to be worse than baseline  · BNP earlier today over 26,000  · Stat portable chest x-ray pending  · Initially given 40 Lasix x1 today, on admission to ICU given additional 80 Lasix x1  · Monitor I&O's   · NPO while on BiPAP and in respiratory distress  · Fluid goal for 24 hours at -1 L  · Started on nitro drip -- titratable with goal of systolic blood pressure less than 140  · Cardiac monitoring  · Obtain stat EKG  · Stat labs pending  · Pulmonary toileting as able  · Stat blood cultures x2, check strep/legionella  · Solu-Medrol 125 mg IV x1 given  · Scheduled Xopenex, Atrovent  · Start broad-spectrum antibiotic therapy with Zithromax, ceftriaxone, vancomycin    Acute on chronic diastolic heart failure ([facility] )  Assessment & Plan  Wt Readings from Last 3 Encounters:   10/21/19 87 5 kg (192 lb 14 4 oz)   10/17/19 86 2 kg (190 lb 0 6 oz)   10/10/19 84 3 kg (185 lb 14 4 oz)         · Grade 2 diastolic heart failure noted on previous echo in August 2019  · Repeat echo pending  · Current acute on chronic respiratory failure necessitating transfer to ICU and placed on BiPAP likely attributable to worsening of this chronic diastolic heart failure  · Initially given 40 Lasix x1 today by Nephrology, additional 80 Lasix IV x1 provided on admission ICU  · Plan on monitoring I&O's closely with 24 hour fluid goal of net -1 L    Acute-on-chronic kidney injury ([facility] )  Assessment & Plan  · Noted on chart review to have chronic renal insufficiency with creatinine baseline appearing to be about 1 5-1 6  · Creatinine this morning 1 41 -- stat labs pending  · Heller in place for accurate I&O's   · Nephrology following, who placed patient on Lasix today -- will need to be seen by Nephrology given acute change in clinical status  · Avoid nephrotoxic agents  · Trend renal indices on serial BMPs    Essential hypertension  Assessment & Plan  · On home Norvasc p o , currently being held for respiratory status  · Systolic blood pressure 876 on arrival to ICU, started on nitroglycerin drip, titratable, with systolic blood pressure goal less than 140    Controlled type 2 diabetes mellitus with diabetic neuropathy, without long-term current use of insulin [facility])  Assessment & Plan  Lab Results   Component Value Date    HGBA1C 5 0 08/16/2019       Recent Labs     10/21/19  0650 10/21/19  1735   POCGLU 103 373*       Blood Sugar Average: Last 72 hrs:       · HbA1c on 08/16/2019 -- 5  · Sinus scale insulin with fingerstick blood glucose q 6 hours for NPO status to maintain blood sugar 140-180      Hyperlipidemia  Assessment & Plan  · Not on home statin    AZUL (obstructive sleep apnea)  Assessment & Plan  · Previous chart review notes that she has been noncompliant with CPAP at night for past 10-15 years  · Currently on BiPAP for acute on chronic respiratory failure with hypoxia    SDH (subdural hematoma) ([facility] )  Assessment & Plan  · Initial presents to the emergency department on 10/8/19 after a fall from standing without loss of consciousness  · Most recent [facility] on 10/12/19 noted stable right parafalcine extra-axial hemorrhage  · Stat CT head ordered and pending  · Serial neuro exams  · Continuing to hold all blood thinners, ASA, pharmacologic DVT prophylaxis  · Check stat PT/INR    Anemia  Assessment & Plan  · Baseline hemoglobin appears to be 9-10  · Last hemoglobin this morning 8 8  · No overt signs and symptoms of bleeding -- continue to monitor  · Trend on serial hemoglobins  · Plan for transfusion if hemoglobin falls less than 7    S/P AVR  Assessment & Plan  · Normal function of bioprosthetic valve noted on previous echo in August 2019    H/O: CVA (cerebrovascular accident)  Assessment & Plan  · Currently appears to be at her baseline, awake alert oriented x3 with no overt deficits noted  · Continue scheduled neurological checks  · ASA and Plavix currently on hold given recent subdural hematoma    Hypothyroidism  Assessment & Plan  · On home Synthroid p o , currently being held respiratory status -- plan to restart as soon as able  · Check TSH stat      -------------------------------------------------------------------------------------------------------------  Chief Complaint: "I can't breathe"    History of Present Illness   HX and PE limited by: Clinical Status  Faheem Brooks is a 80 y o  female with a past medical history of chronic kidney injury, essential hypertension, hypothyroidism, history of CVA, chronic diastolic heart failure, status post aortic valve replacement, anemia, AZUL, hyperlipidemia, diabetes type 2 not on home insulin presents is critical care admission today from rehab, in which she was being treated following a subdural hematoma status post fall  Chart review appears that during her admission to rehab, she was being followed by Nephrology, who transition her from torsemide to Lasix after she had refused torsemide due to diarrhea, and she was given 40 of Lasix x1 today  However, during the day, she had progressive worsening of shortness of breath resulting in acute respiratory failure with hypoxia  Her initial blood gas showed pH of 7 1, CO2 of 81, PO2 156, bicarb of 27 6    She is placed on BiPAP and given an additional dose of Lasix 80 mg IV x1  She notably had a BNP over 26,000 today  Portable chest x-ray pending  She started only nitroglycerin drip for a systolic blood pressure over 200  She is given a 1 time dose of Solu-Medrol 125 mg IV is started on scheduled nebs  She states she feels much improved on the BiPAP and endorses no complaints at this time  Her daughter, Ammon Setting, at bedside and updated as to happenings and plan of care, and she is in agreement  Currently, assessment, patient appears comfortable on BiPAP, though appears to be in mild-to-moderate respiratory distress, including tachypnea, conversational dyspnea, and use of accessory muscles while breathing  She is admitted to critical care for intensive diuresis, tenuous respiratory status requiring BiPAP and blood pressure control  History obtained from chart review and unobtainable from patient due to tenuous respiratory status   -------------------------------------------------------------------------------------------------------------  Dispo: Admit to Critical Care    Code Status: Level 1 - Full Code  --------------------------------------------------------------------------------------------------------------  Review of Systems   Constitutional: Negative for activity change, appetite change, chills, diaphoresis, fatigue, fever and unexpected weight change  HENT: Negative for congestion, ear pain, facial swelling, rhinorrhea, sinus pain, sore throat, trouble swallowing and voice change  Eyes: Negative  Respiratory: Positive for shortness of breath and wheezing  Negative for apnea, cough, choking, chest tightness and stridor  Cardiovascular: Negative for chest pain, palpitations and leg swelling  Gastrointestinal: Negative for abdominal distention, abdominal pain, constipation, diarrhea, nausea and vomiting  Endocrine: Negative  Genitourinary: Positive for difficulty urinating  Negative for flank pain  Musculoskeletal: Negative  Allergic/Immunologic: Negative  Neurological: Negative for dizziness, facial asymmetry, speech difficulty, weakness, numbness and headaches  Hematological: Negative  Psychiatric/Behavioral: Negative  All other systems reviewed and are negative  A 12-point, complete review of systems was reviewed and negative except as stated above     Physical Exam   Constitutional: She is oriented to person, place, and time  She appears well-developed and well-nourished  She is cooperative  She appears ill  No distress  HENT:   Head: Normocephalic and atraumatic  Mouth/Throat: Oropharynx is clear and moist and mucous membranes are normal  No oropharyngeal exudate  Eyes: Pupils are equal, round, and reactive to light  EOM and lids are normal  Right eye exhibits no discharge  Left eye exhibits no discharge  No scleral icterus  PERRLA 2mm BL    Neck: Normal range of motion  Neck supple  No tracheal deviation present  Cardiovascular: Normal rate, regular rhythm, normal heart sounds and intact distal pulses  Exam reveals no gallop and no friction rub  No murmur heard  Pulses:       Radial pulses are 2+ on the right side, and 2+ on the left side  Dorsalis pedis pulses are 2+ on the right side, and 2+ on the left side  Pulmonary/Chest: Accessory muscle usage present  Tachypnea noted  She is in respiratory distress  She has decreased breath sounds (grossly throughout BL, more pronounced in the BL bases)  She has wheezes (inspiratory and expiratory throughout posteriorly)  BiPAP applied   Abdominal: Soft  Bowel sounds are normal  She exhibits no distension  There is no tenderness  Obese appearance   Genitourinary:   Genitourinary Comments: Pina present   Musculoskeletal: Normal range of motion  She exhibits no edema, tenderness or deformity  Lymphadenopathy:     She has no cervical adenopathy  Neurological: She is alert and oriented to person, place, and time  No cranial nerve deficit   GCS eye subscore is 4  GCS verbal subscore is 5  GCS motor subscore is 6  Skin: Skin is warm and dry  Capillary refill takes less than 2 seconds  No rash noted  She is not diaphoretic  There is pallor  Psychiatric: She has a normal mood and affect  Her speech is normal and behavior is normal  Judgment and thought content normal    Nursing note and vitals reviewed      --------------------------------------------------------------------------------------------------------------  Historical Information   Past Medical History:   Diagnosis Date    Arthritis     Breast cancer ([facility] ) 2015    Cardiac disease     aortic valve transplant    [facility]estive [facility]     Compression fracture of body of thoracic vertebra ([facility])     CVA (cerebral vascular accident) ([facility] )     Diabetes mellitus ([facility] )     Disease of thyroid gland     Fibromyalgia, primary     H/O cervical fracture 01/09/2019    Hyperlipidemia     Hypertension     Neuropathy     AZUL (obstructive sleep apnea) 10/19/2019    Pressure injury of skin     Renal disorder     kidney stent    Stroke [facility])      Past Surgical History:   Procedure Laterality Date    AORTIC VALVE SURGERY      BREAST LUMPECTOMY Right     BREAST LUMPECTOMY Left     BREAST SURGERY      lumpectomy for breast cancer    CATARACT EXTRACTION      CHOLECYSTECTOMY      HYSTERECTOMY  1978    KIDNEY SURGERY      stent placed for kidney    OTHER SURGICAL HISTORY      abdominal aneurysm surgery    CT ARTHRODESIS POSTERIOR ATLAS-AXIS C1-C2 N/A 5/1/2019    Procedure: C1-C2 lateral mass fixation fusion with possible sublaminar cables;  Surgeon: Ector Giron MD;  [facility]: [facility];  Service: Neurosurgery    REPLACEMENT TOTAL KNEE      left      Social History   Social History     Substance and Sexual Activity   Alcohol Use Never    Frequency: Never    Binge frequency: Never     Social History     Substance and Sexual Activity   Drug Use No     Social History     Tobacco Use   Smoking Status Never Smoker   Smokeless Tobacco Never Used     Exercise History: NA  Family History:   I have reviewed this patient's family history and commented on sigificant items within the HPI and   Family History   Problem Relation Age of Onset    Heart disease Mother     Arthritis Mother     Diabetes Mother     Heart failure Father     Arthritis Father     Other Father         enlargement of prostate     Dementia Father     No Known Problems Daughter     No Known Problems Maternal Grandmother     No Known Problems Maternal Grandfather     No Known Problems Paternal Grandmother     No Known Problems Paternal Grandfather     No Known Problems Maternal Aunt     No Known Problems Maternal Aunt     No Known Problems Maternal Aunt     No Known Problems Maternal Aunt     No Known Problems Paternal Aunt     No Known Problems Paternal Aunt        Vitals:   Vitals:    10/21/19 1931 10/21/19 1935 10/21/19 1945 10/21/19 2000   BP: 136/58  125/62 143/65   BP Location: Left arm  Left arm Left arm   Pulse: 69  67 70   Resp: 20  22 (!) 24   SpO2:  100%     Weight:       Height:         Temp  Min: 96 6 °F (35 9 °C)  Max: 99 5 °F (37 5 °C)  IBW: 43 2 kg  Height: 4' 11" (149 9 cm)  Body mass index is 41 14 kg/m²        Medications:  Current Facility-Administered Medications   Medication Dose Route Frequency    acetaminophen (TYLENOL) tablet 650 mg  650 mg Oral Q6H PRN    azithromycin (ZITHROMAX) 500 mg in sodium chloride 0 9 % 250 mL IVPB  500 mg Intravenous Q24H    cefepime (MAXIPIME) IVPB (premix) 2,000 mg  2,000 mg Intravenous Q24H    famotidine (PEPCID) injection 20 mg  20 mg Intravenous Q48H    insulin lispro (HumaLOG) 100 units/mL subcutaneous injection 1-5 Units  1-5 Units Subcutaneous Q6H [facility]    ipratropium (ATROVENT) 0 02 % inhalation solution 0 5 mg  0 5 mg Nebulization 4x Daily    levalbuterol (XOPENEX) 1 25 mg/0 5 mL inhalation solution **ADS Override Pull**        levalbuterol [facility]) inhalation solution 1 25 mg  1 25 mg Nebulization 4x Daily    [START ON 10/22/2019] levothyroxine tablet 112 mcg  112 mcg Oral Daily    metoprolol tartrate (LOPRESSOR) tablet 25 mg  25 mg Oral Q12H [address]    nitroGLYcerin (TRIDIL) 50 mg in 250 mL infusion (premix)  5-200 mcg/min Intravenous Titrated    nystatin (MYCOSTATIN) powder   Topical BID    polyethylene glycol (MIRALAX) packet 17 g  17 g Oral Daily PRN    vancomycin (VANCOCIN) IVPB (premix) 1,000 mg  15 mg/kg (Adjusted) Intravenous Q24H     Home medications:  Prior to Admission Medications   Prescriptions Last Dose Informant Patient Reported? Taking?    HYDROcodone-acetaminophen (NORCO) 5-325 mg per tablet   Yes No   Sig: Take 1 tablet by mouth every 6 (six) hours as needed for pain   LORazepam (ATIVAN) 0 5 mg tablet   Yes No   Sig: Take by mouth every 8 (eight) hours as needed for anxiety   Multiple Vitamins-Calcium (MULTI-DAY/CALCIUM/EXTRA IRON PO)  Self Yes No   Sig: Take 1 tablet by mouth daily   Multiple Vitamins-Minerals (CENTRUM ADULTS PO)  Self Yes No   Sig: Centrum   Omega-3 Fatty Acids (FISH OIL) 1,000 mg  Self Yes No   Sig: Fish Oil 1,000 mg capsule   Red Yeast Rice Extract (RED YEAST RICE PO)   Yes No   Sig: Take 600 mg by mouth daily   acetaminophen (TYLENOL) 325 mg tablet  Self No No   Sig: Take 2 tablets (650 mg total) by mouth every 6 (six) hours as needed for mild pain   Patient taking differently: Take 1,000 mg by mouth 3 (three) times a day as needed for mild pain    albuterol (PROVENTIL HFA,VENTOLIN HFA) 90 mcg/act inhaler  Self Yes No   Sig: Inhale 2 puffs every 4 (four) hours as needed for wheezing   amLODIPine (NORVASC) 5 mg tablet  Self Yes No   Sig: Take 5 mg by mouth every morning    calcium carbonate-vitamin D (OSCAL-D) 500 mg-200 units per tablet  Self Yes No   Sig: Take 1 tablet by mouth 2 (two) times a day with meals     docusate sodium (COLACE) 100 mg capsule   No No   Sig: Take 1 capsule (100 mg total) by mouth 2 (two) times a day as needed for constipation   Patient not taking: Reported on 10/19/2019   gabapentin (NEURONTIN) 300 mg capsule  Self No No   Sig: Take 2 capsules PO three times a day   levETIRAcetam (KEPPRA) 500 mg tablet   No No   Sig: Take 1 tablet (500 mg total) by mouth 2 (two) times a day for 5 days   levothyroxine 112 mcg tablet   Yes No   Sig: Take 112 mcg by mouth daily    lidocaine (LIDODERM) 5 %  Self Yes No   Sig: Apply 2 patches topically daily Remove & Discard patch within 12 hours or as directed by MD     methocarbamol (ROBAXIN) 500 mg tablet  Self No No   Sig: Take 1 tablet (500 mg total) by mouth 3 (three) times a day as needed for muscle spasms   metoprolol tartrate (LOPRESSOR) 25 mg tablet  Self Yes No   Sig: Take 25 mg by mouth 2 (two) times a day    nitroglycerin (NITROSTAT) 0 4 mg SL tablet  Self Yes No   Sig: Place under the tongue every 5 (five) minutes as needed for chest pain    nystatin (MYCOSTATIN) powder  Self Yes No   Sig: Apply topically as needed    sertraline (ZOLOFT) 25 mg tablet  Self Yes No   Si mg daily at bedtime       Facility-Administered Medications: None     Allergies: Allergies   Allergen Reactions    Duloxetine Hcl Other (See Comments) and Hypertension    Duloxetine Hcl      Cymbalta;  Hemorrhagic stroke listed as reaction    Escitalopram      Other reaction(s): Urinary Retention    Pregabalin      Other reaction(s): Hypertension    Statins Myalgia    Tramadol      Other reaction(s): Hypertension    Triprolidine-Pse Other (See Comments)    Anastrozole Abdominal Pain    Anastrozole     Antihistamines, Diphenhydramine-Type     Exemestane      Aromasin; Muscle pain & cramps    Lexapro [Escitalopram]      Urinary retention    Lyrica [Pregabalin]      hypertension    Metformin      diarrhea    Oxycodone      Pt unsure      Statins      Muscle pain & cramps    Tramadol      hypertension    Triprolidine-Pseudoephedrine     Metformin Diarrhea Laboratory and Diagnostics:  Results from last 7 days   Lab Units 10/21/19  1853 10/21/19  0533 10/18/19  0513 10/17/19  0443 10/16/19  0502 10/15/19  1131 10/15/19  0442   WBC Thousand/uL 11 80*  --  6 25 6 06 5 68  --  6 28   HEMOGLOBIN g/dL 9 7* 8 8* 8 3* 8 7* 9 3*  --  8 4*   HEMATOCRIT % 30 1* 26 4* 26 9* 27 3* 29 9*  --  27 3*   PLATELETS Thousands/uL 261  --  193 186 209 190 182   NEUTROS PCT %  --   --  62 64 61  --  65   MONOS PCT %  --   --  8 7 8  --  7     Results from last 7 days   Lab Units 10/21/19  1853 10/21/19  0533 10/18/19  0513 10/17/19  0443 10/16/19  0502 10/15/19  1131 10/15/19  0442   SODIUM mmol/L 134* 134* 138 139 135* 135* 138   POTASSIUM mmol/L 5 2* 4 3 4 1 4 5 4 0 3 7 4 1   CHLORIDE mmol/L 91* 94* 103 103 100 100 101   CO2 mmol/L 30 32* 28 30 28 27 30   ANION GAP mmol/L 13 8 7 6 7 8 7   BUN mg/dL 48* 46* 37* 33* 33* 35* 36*   CREATININE mg/dL 1 90* 1 41* 1 53* 1 50* 1 41* 1 52* 1 65*   CALCIUM mg/dL 9 4 9 1 8 6 8 6 8 9 8 9 8 9   GLUCOSE RANDOM mg/dL 224* 104* 96 89 84 150* 97   ALT U/L 26  --   --   --   --   --   --    AST U/L 41*  --   --   --   --   --   --    ALK PHOS U/L 88  --   --   --   --   --   --    ALBUMIN g/dL 4 3  --   --   --   --   --   --    TOTAL BILIRUBIN mg/dL 0 70  --   --   --   --   --   --      Results from last 7 days   Lab Units 10/21/19  1853 10/21/19  0533 10/16/19  0502 10/15/19  1131   MAGNESIUM mg/dL 2 4*  --  2 4 2 5   PHOSPHORUS mg/dL 7 9* 4 0 3 2 2 9      Results from last 7 days   Lab Units 10/21/19  1853   INR  0 94   PTT seconds 21*      Results from last 7 days   Lab Units 10/21/19  1853   TROPONIN I ng/mL 3 24*     Results from last 7 days   Lab Units 10/21/19  1852   LACTIC ACID mmol/L 1 8     ABG:  Results from last 7 days   Lab Units 10/21/19  1747   PH ART  7 140*   PCO2 ART mm Hg 81 0*   PO2 ART mm Hg 156 0*   HCO3 ART mmol/L 27 6*   BASE EXC ART mmol/L -3 1*   ABG SOURCE  Radial, Left     VBG:  Results from last 7 days   Lab Units 10/21/19  1747 10/16/19  2209   PH KHALIF   --  7 331   PCO2 KHALIF mm Hg  --  59 5*   PO2 KHALIF mm Hg  --  22 9*   HCO3 KHALIF mmol/L  --  30 7*   BASE EXC KHALIF mmol/L  --  3 8   ABG SOURCE  Radial, Left  --            Micro:  BC x2 pending   Strep/legionella pending      EKG: NSR on tele, HR 68  Imaging: STAT CXR, CTH pending    ------------------------------------------------------------------------------------------------------------  Advance Directive and Living Will: Yes    Power of : Yes  POLST:    ------------------------------------------------------------------------------------------------------------  Anticipated Length of Stay is > 2 midnights    Counseling / Coordination of Care  Total Critical Care time spent 60 minutes excluding procedures, teaching and family updates  NATHEN Cohen        Portions of the record may have been created with voice recognition software  Occasional wrong word or "sound a like" substitutions may have occurred due to the inherent limitations of voice recognition software    Read the chart carefully and recognize, using context, where substitutions have occurred

## 2019-10-21 NOTE — PROGRESS NOTES
PT [place]     10/20/19 0945   Patient Data   Rehab Impairment Impairment of mobility, safety and Activities of Daily Living (ADLs) due to Brain Dysfunction:  02 22  Traumatic, Closed Injury    Etiologic Diagnosis Traumatic SDH s/p Fall   Date of Onset 10/08/19   Home Setup   Type of Home Single Level   Method of Entry Ramp   Number of Stairs 0   Number of Stairs in Home 1   In Home Hand Rail Left  (descending to bathroom)   Available Equipment Roller Walker;Rollator; Wheelchair   Prior Level of Function   Indoor-Mobility (Ambulation) 3  Independent - Patient completed the activities by him/herself, with or without an assistive device, with no assistance from a helper  Stairs 2  Needed Some Help - Patient needed a partial assistance from another person to complete activities  Prior Device Used D  [employer] in the Last Year   Number of falls in the past 12 months 3   Type of Injury Associated with Fall Major injury   Patient Preference   Nickname (Patient Preference) 2001 [facility] Practices Sleeps in recliner at home for several years   Psychosocial   Psychosocial (WDL) WDL   Patient Behaviors/Mood Cooperative   Restrictions/Precautions   Precautions Bed/chair alarms; Fall Risk;O2   Weight Bearing Restrictions No   ROM Restrictions No   Pain Assessment   Pain Assessment 0-10   Pain Score 4   Pain Type Chronic pain   Pain Location Neck   Pain Orientation Right   Pain Radiating Towards Shoulder   Pain Descriptors Aching   Pain Frequency Constant/continuous   Pain Onset Ongoing   Effect of Pain on Daily Activities Limits patients ability to tolerate mobility due to use of UEs on RW to bear weight   Patient's Stated Pain Goal No pain   Hospital Pain Intervention(s) Other (Comment)  (gentle massage to R trap and SCM)   Response to Interventions Pain reduced to 0/10   Transfer Bed/Chair/Wheelchair   Positioning Concerns Skin Integrity   Limitations Noted In Balance;Confidence; Endurance;Pain Management;UE Strength;LE Strength   Adaptive Equipment Roller Walker   Type of Assistance Needed Physical assistance   Amount of Physical Assistance Provided 50%-74%   Chair/Bed-to-Chair Transfer CARE Score 2   Roll Left and Right   Type of Assistance Needed Physical assistance   Amount of Physical Assistance Provided 75% or more   Roll Left and Right CARE Score 2   Sit to Lying   Type of Assistance Needed Physical assistance   Amount of Physical Assistance Provided 75% or more   Sit to Lying CARE Score 2   Lying to Sitting on Side of Bed   Type of Assistance Needed Physical assistance   Amount of Physical Assistance Provided 75% or more   Lying to Sitting on Side of Bed CARE Score 2   Sit to Stand   Type of Assistance Needed Physical assistance   Amount of Physical Assistance Provided 50%-74%   Sit to Stand CARE Score 2   Picking Up Object   Type of Assistance Needed Physical assistance   Amount of Physical Assistance Provided Total assistance   Comment Recommend introducing reacher   Picking Up Object CARE Score 1   Car Transfer   Reason if not Attempted Environmental limitations   Car Transfer CARE Score 10   Ambulation   Primary Mode of Locomotion Prior to Admission Walk   Distance Walked (feet) 35 ft   Assist Device Roller Walker   Gait Pattern Inconsistant Jenny; Slow Jenny;Decreased foot clearance; Step to   Limitations Noted In Endurance;Strength   Provided Assistance with: Weight Shift   Walk Assist Level Moderate Assist   Walk 10 Feet   Type of Assistance Needed Physical assistance   Amount of Physical Assistance Provided 25%-49%   Walk 10 Feet CARE Score 3   Walk 50 Feet with Two Turns   Type of Assistance Needed Physical assistance   Amount of Physical Assistance Provided 25%-49%   Walk 50 Feet with Two Turns CARE Score 3   Walk 150 Feet   Reason if not Attempted Safety concerns   Walk 150 Feet CARE Score 88   Walking 10 Feet on Uneven Surfaces   Reason if not Attempted Safety concerns   Walking 10 Feet on Uneven Surfaces CARE Score 88   Curb or Single Stair   Style negotiated Single stair   Type of Assistance Needed Physical assistance   Amount of Physical Assistance Provided 50%-74%   1 Step (Curb) CARE Score 2   4 Steps   Reason if not Attempted Safety concerns   4 Steps CARE Score 88   12 Steps   Reason if not Attempted Safety concerns   12 Steps CARE Score 88   RLE Assessment   RLE Assessment X   Strength RLE   RLE Overall Strength 3+/5   LLE Assessment   LLE Assessment X   Strength LLE   LLE Overall Strength 3+/5   Sensation   Light Touch No apparent deficits   Sharp/Dull No apparent deficits   Propioception No apparent deficits   Cognition   Overall Cognitive Status WFL   Arousal/Participation Cooperative   Attention Within functional limits   Orientation Level Oriented X4   Therapeutic Exercise   Therapeutic Exercise/Activity Patient focused on setting up appropriate equipment for patient including RW, WC and bedside chair  Primary focus during treatment session was on practicing functional transfers between [facility] and arm chair and gait training  Patient was able to tolerate ambulation 35' x 2 and 45' x 1  Performed alternating toe taps on single stair 10 x 2  Step ups 4 x 3 with BUE support on hand rail  LAQs 10 x 3  Sit to stands 3 x 3 from WC  Discharge Information   Patient's Discharge Plan To return home with help from her daughter and members of her [employer]  Patient's Rehab Expectations To be able to move around better without falling  Barriers to Discharge Home Decreased Strength;Decreased Endurance   Impressions Pt presented to the hospital s/p a fall at home while having a visit from home health nurse  She was negotiating her step from her bathroom to her living area when she caught her toe and tripped  Pt has had several falls in the last few months both in her home and in the community   Patient has a significant medical history including previous rehabilitation stay from a fall resulting in cervical vertebral fractures, cardiac history (several bouts of CHF), knee replacement on LLE and chronic pain in right knee  Her persistent cervical pain results in limited activity tolerance to mobility- pain limited  Her cardiac function results in fatigue with transfers and ambulation and limited strength and ROM in knees make ambulation and chair transfers very difficult  Overall, the patient presents with good rehab potential to reach a supervision level with transfers, household ambulation and a single stair which will allow her to return home with support as she had previously  This was discussed with both the patient and her daughter, Litzy Erickson, who was present for part of the evaluation and both were in agreement  Skilled PT will focus on LE strengthening, increasing activity tolerance with mobility and providing education on safety strategies with mobility  Will monitor vitals before, during and after therapeutic activity to assure safe response to all activity      PT Therapy Minutes   PT Time In 0945   PT Time Out 1115   PT Total Time (minutes) 90   PT Mode of treatment - Individual (minutes) 90   PT Mode of treatment - Concurrent (minutes) 0   PT Mode of treatment - Group (minutes) 0   PT Mode of treatment - Co-treat (minutes) 0   PT Mode of Treatment - Total time(minutes) 90 minutes   PT Cumulative Minutes 90   Cumulative Minutes   Cumulative therapy minutes 90

## 2019-10-21 NOTE — ASSESSMENT & PLAN NOTE
Well controlled on Zoloft  Requesting an order for xanax but will hold off on benzos for now to prevent any decline in resp status

## 2019-10-21 NOTE — PROGRESS NOTES
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, recent episode of acute kidney injury in August and early October 2019   -baseline creatinine 1 2-1 3   -creatinine peaked at 2 4 in 10/8, continues to improve  Has been stable between 1 4-1 5  May have higher baseline   -renal ultrasound:  Pending   -last UA showed trace leukocytes, 2-4 WBCs  -refusing torsemide D/T diarrhea since starting    - will change to Lasix  -will need outpatient follow-up with Dr Vero Lorenzo at discharge    -continue to avoid nephrotoxins  -I/O   -continue to monitor renal indices  Recent acute subdural hematoma:  Continues inpatient rehab  Hypertension:  With history of renal artery stenosis, S/P stent placement   -blood pressure is stable, will continue to monitor   -continues on amlodipine 5 mg daily and metoprolol 25 mg every 12 hours  -continues on Lasix  CHF:  Ejection fraction of 55% with grade 2 diastolic dysfunction  Requiring intermittent oxygen support   -continues on lasix    -cardiology following   -continue daily weights, I/O  Mild hyponatremia:  -placed on fluid restriction   -continue to monitor   -currently on diuretics  Anemia:  Hemoglobin is stable in the 8's    -continue to monitor and transfuse as needed  -iron panel showed low iron at 45  Disposition:  Requiring inpatient stay due to rehabilitation needs  SUBJECTIVE:  The patient is sitting out of bed in chair  She denies chest pain  She states that she is slightly short of breath and is requiring supplemental oxygen  She denies nausea or vomiting  She states that she is having diarrhea but describes this as having 2 loose bowel movements a day  Her lower extremity edema is improved  She is eating and drinking well    She is urinating without difficulty      OBJECTIVE:  Current Weight: Weight - Scale: 87 kg (191 lb 12 8 oz)  Vitals:    10/20/19 1500 10/20/19 2045 10/20/19 2100 10/20/19 2300   BP: 129/55 136/62  116/56   BP Location: Right arm   Left arm   Pulse: 66 66  56   Resp: 19 16   Temp: (!) 97 1 °F (36 2 °C)   98 2 °F (36 8 °C)   TempSrc: Tympanic   Tympanic   SpO2: 94%  98% 94%   Weight:       Height:           Intake/Output Summary (Last 24 hours) at 10/21/2019 0758  Last data filed at 10/21/2019 0715  Gross per 24 hour   Intake 400 ml   Output    Net 400 ml     General: No apparent distress  Skin: warm, dry, intact, no rash  HEENT: Moist mucous membranes, sclera anicteric, normocephalic  Neck: No apparent JVD appreciated  Chest:  Wheeze B/L, on supplemental oxygen  Heart: Regular rate and rhythm, No murmer  Abdomen: Soft, round, NT, +BS  Extremities: No B/L LE edema present  Neuro: Alert and oriented  Psych: Appropriate mood and affect    Medications:    Current Facility-Administered Medications:     acetaminophen (TYLENOL) tablet 650 mg, 650 mg, Oral, Q6H PRN, Kia Molina MD, 650 mg at 10/21/19 0410    albuterol (PROVENTIL HFA,VENTOLIN HFA) inhaler 2 puff, 2 puff, Inhalation, Q4H PRN, Alvarado Spring MD, 2 puff at 10/21/19 0244    amLODIPine (NORVASC) tablet 5 mg, 5 mg, Oral, QAM, Kia Molina MD, 5 mg at 10/20/19 3494    gabapentin (NEURONTIN) capsule 300 mg, 300 mg, Oral, TID, Kia Molina MD, 300 mg at 10/20/19 2045    levothyroxine tablet 112 mcg, 112 mcg, Oral, Daily, Kia Molina MD, 112 mcg at 10/21/19 0534    lidocaine (LIDODERM) 5 % patch 1 patch, 1 patch, Topical, Daily PRN, Alvarado Spring MD    methocarbamol (ROBAXIN) tablet 500 mg, 500 mg, Oral, Q8H PRN, Kia Molina MD, 500 mg at 10/21/19 0244    metoprolol tartrate (LOPRESSOR) tablet 25 mg, 25 mg, Oral, Q12H [address], Kia Molina MD, 25 mg at 10/20/19 2045    nystatin (MYCOSTATIN) powder, , Topical, BID, Kia Molina MD    polyethylene glycol (MIRALAX) packet 17 g, 17 g, Oral, Daily PRN, Marshall Nunn MD    sertraline (ZOLOFT) tablet 25 mg, 25 mg, Oral, HS, Marshall Nunn MD, 25 mg at 10/20/19 2106    torsemide (DEMADEX) tablet 40 mg, 40 mg, Oral, Daily, Marshall Nunn MD, 40 mg at 10/20/19 5958    Laboratory Results:  Results from last 7 days   Lab Units 10/21/19  0533 10/18/19  0513 10/17/19  0443 10/16/19  0502 10/15/19  1131 10/15/19  0442   WBC Thousand/uL  --  6 25 6 06 5 68  --  6 28   HEMOGLOBIN g/dL 8 8* 8 3* 8 7* 9 3*  --  8 4*   HEMATOCRIT % 26 4* 26 9* 27 3* 29 9*  --  27 3*   PLATELETS Thousands/uL  --  193 186 209 190 182   POTASSIUM mmol/L 4 3 4 1 4 5 4 0 3 7 4 1   CHLORIDE mmol/L 94* 103 103 100 100 101   CO2 mmol/L 32* 28 30 28 27 30   BUN mg/dL 46* 37* 33* 33* 35* 36*   CREATININE mg/dL 1 41* 1 53* 1 50* 1 41* 1 52* 1 65*   CALCIUM mg/dL 9 1 8 6 8 6 8 9 8 9 8 9   MAGNESIUM mg/dL  --   --   --  2 4 2 5  --    PHOSPHORUS mg/dL 4 0  --   --  3 2 2 9  --

## 2019-10-21 NOTE — PROGRESS NOTES
Physical Medicine and Rehabilitation Progress Note  Ivy Harris 80 y o  female MRN: [id number]  Unit/Bed#: [facility] 270-01 Encounter: [id number]    HPI: "Ivy Harris is a 80 y o  female who presented to the [facility] [address] [facility] [address] with weakness after SDH which was managed non-operatively, stable per most recent [facility]  Noted to have acute on chronic renal failure for which renal has been consulted " Accepted to [facility] on 10/19/19  Chief Complaint: f/u non-traumatic brain injury, f/u ambulatory dysfunction and shortness of breath    Interval: No acute events overnight  This morning, patient with significant wheezing, anxiety  Currently on supplemental O2  Discussed with IM team, CXR was ordered, showing new small effusions as well as possible airspace consolidation in LL base  Patient otherwise hemodynamically stable, BP and HR stable  ARC hold therapy order placed today due to SOB  ROS: A 10 point ROS was performed; negative except as noted above  Assessment/Plan:    * SDH (subdural hematoma) (HCC)  Assessment & Plan  · Acute comprehensive interdisciplinary inpatient rehabilitation including PT, OT, and/or SLP, RN, CM, SW, dietary, psychology, etc   · Goal: modified independent  · right sdh  · per last neurosx note of 10/9 hold all blood thinners for 2 wks and then FU CTH at 2 wks time to determine if blood thinners can be resumed  Repeat CTOH ordered for 10/23  Awaiting neurosurgery call-back regarding DVT prophylaxis with heparin     · 2 week keppra course completed    AZUL (obstructive sleep apnea)  Assessment & Plan  - non-compliant at home and has not used CPAP for 10 to 15 years now    Anemia  Assessment & Plan  Results from last 7 days   Lab Units 10/21/19  0533 10/18/19  0513 10/17/19  0443   HEMOGLOBIN g/dL 8 8* 8 3* 8 7*     · Monitor CBC intermittently  · Transfuse for Hgb <7    Acute-on-chronic kidney injury [facility])  Assessment & Plan  Results from last 7 days   Lab Units 10/21/19  0533 10/18/19  0513 10/17/19  0443   CREATININE mg/dL 1 41* 1 53* 1 50*       · Continue monitoring kidney function via intermittent BMP  · Avoid nephrotoxic meds/NSAIDs  · Encourage fluid intake  · Nephrology team following, will appreciate recs    S/P AVR  Assessment & Plan  - bioprosthetic valve   - nl fxn per last echo in EMR     Depression  Assessment & Plan  - zoloft     Chronic diastolic heart failure (HCC)  Assessment & Plan  · Most recent EF: 55%, with moderate hypokinesis of mid anteroseptal walls noted  Patient with grade 2 diastolic dysfunction  · 10/17 CXR shows vascular congestion and small b/l pleural effusions  · Repeat CXR performed today, shows new small effusions and possible airspace consolidation in LL base  · BNP at [address]  · Patient refused torsemide as patient feels it was giving her loose stools  · Renal has switched her to lasix  · Holding therapy today due to SOB      Essential hypertension  Assessment & Plan  Temp:  [97 1 °F (36 2 °C)-98 2 °F (36 8 °C)] 98 2 °F (36 8 °C)  HR:  [56-72] 72  Resp:  [16-19] 16  BP: (116-149)/(55-78) 149/78    · Continue Amlodipine  · Continue Metoprolol   · IM service managing anti-hypertensives, adjusting as needed    Abnormal CT scan, cervical spine  Assessment & Plan  - on CT c-spine of 10/8 report noted stable C6 anterior osteophyte fx and 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  - evaluated on 10/9 by neurosx and no intervention/further BENNETT recommended  F/u as outpatient       H/O: CVA (cerebrovascular accident)  Assessment & Plan  - home plavix on hold due to extra-axial hemorrhage, per neurosx consult of 10/9 to be held for 2 wks until [facility] at which time neurosx will decide based on FU CTH if plavix may be resumed  - intolerant to statins per EMR     Hypothyroidism  Assessment & Plan  - levothyroxine     # Skin  · Encourage regular turning as patient at risk for skin breakdown  · Staff to continue patient education on Q2h turning  · Rehabilitation team to perform skin checks regularly     # Bowel  · Patient reports no constipation  · To ensure regular BMs, bowel regimen consisting of:  colace , dulcolax suppository  and miralax     # Bladder  · Patient voiding spontaneously    # Pain  · Continue tylenol, for max of 3gm daily  # Rehab Psych   · referral to Rehabilitation Psychology    # Other  - Diet/Nutrition:        Diet Orders   (From admission, onward)             Start     Ordered    10/21/19 0806  Diet Cardiovascular; Sodium 2 GM; Fluid Restriction 1500 ML  Diet effective now     Question Answer Comment   Diet Type Cardiovascular    Cardiac Sodium 2 GM    Other Restriction(s): Fluid Restriction 1500 ML    RD to adjust diet per protocol?  Yes        10/21/19 0806              - DVT prophy: Sequential compression device (Venodyne)   - GI ppx: None  - Nausea: None  - Supplements: None  - Sleep: None    Disposition: TBD    CODE: Level 1: Full Code Scheduled Meds:    Current Facility-Administered Medications:  acetaminophen 650 mg Oral Q6H PRN Dez Gomez MD   albuterol 2 puff Inhalation Q4H PRN Anton Shrestha MD   albuterol 2 5 mg Nebulization Q6H PRN Luanne Mo, CRNP   amLODIPine 5 mg Oral QAM Dez Gomez MD   furosemide 80 mg Oral BID (diuretic) Ivon Cover, CRNP   gabapentin 300 mg Oral TID Dez Gomez MD   levothyroxine 112 mcg Oral Daily Dez Gomez MD   lidocaine 1 patch Topical Daily PRN Anton Shrestha MD   methocarbamol 500 mg Oral Q8H PRN Dez Gomez MD   metoprolol tartrate 25 mg Oral Q12H [facility] Dez Gomez MD   nystatin  Topical BID Dez Gomez MD   polyethylene glycol 17 g Oral Daily PRN Dez Gomez MD   sertraline 25 mg Oral HS Dez Gomez MD        Objective:    Functional Update:  Mobility: max-total assist  Transfers: mod assist  ADLs: max assist    Allergies per EMR    Physical Exam:  Temp:  [97 1 °F (36 2 °C)-98 2 °F (36 8 °C)] 98 2 °F (36 8 °C)  HR:  [56-72] 72  Resp:  [16-19] 16  BP: (116-149)/(55-78) 149/78  SpO2:  [94 %-98 %] 94 %    General: alert, no apparent distress and cooperative  HEENT:  Head: Normocephalic, no lesions, without obvious abnormality  LUNGS:  expiratory wheezing, bibasilar crepitations noted  ABDOMEN:  soft, non-tender  Bowel sounds normal  No masses, no organomegaly  EXTREMITIES:  extremities normal, warm and well-perfused; no cyanosis, clubbing, or edema  NEURO:   mental status, speech normal, alert and oriented x3  PSYCH:  Alert and oriented, appropriate affect  Physical examination is otherwise unchanged from previous encounter, except as noted above  Diagnostic Studies: Reviewed  XR chest portable   Final Result by Jihan Maldonado MD (10/21 1313)      New small effusions with possible adjacent airspace consolidation in the left lung base  Workstation performed: [id number]         CT head wo contrast    (Results Pending)       Laboratory: Reviewed  Results from last 7 days   Lab Units 10/21/19  0533 10/18/19  0513 10/17/19  0443 10/16/19  0502   HEMOGLOBIN g/dL 8 8* 8 3* 8 7* 9 3*   HEMATOCRIT % 26 4* 26 9* 27 3* 29 9*   WBC Thousand/uL  --  6 25 6 06 5 68     Results from last 7 days   Lab Units 10/21/19  0533 10/18/19  0513 10/17/19  0443   BUN mg/dL 46* 37* 33*   SODIUM mmol/L 134* 138 139   POTASSIUM mmol/L 4 3 4 1 4 5   CHLORIDE mmol/L 94* 103 103   CREATININE mg/dL 1 41* 1 53* 1 50*            ** Please Note: Fluency Direct voice to text software may have been used in the creation of this document   **

## 2019-10-21 NOTE — SEPSIS NOTE
Sepsis Note   Sherryle Kelp 80 y o  female MRN: [id number]  Unit/Bed#:  Encounter: [id number]      qSOFA     Row Name 10/21/19 1900 10/21/19 1844 10/21/19 1829 10/21/19 1815 10/21/19 1800    Altered mental status GCS < 15              Respiratory Rate > / =22  1  0  0  1  1    Systolic BP < / =973  0  0  0  0  0    Q Sofa Score  2  1  1  1  1    Row Name 10/21/19 1745 10/21/19 1730 10/21/19 1722 10/21/19 1700       Altered mental status GCS < 15        1     Respiratory Rate > / =22  1  1  1       Systolic BP < / =669  0  0  0  0     Q Sofa Score  1  1  1           Initial Sepsis Screening     Row Name 10/21/19 Edward Vinson                Is the patient's history suggestive of a new or worsening infection? (!) Yes (Proceed)  -JG        Suspected source of infection  pneumonia  -JG        Are two or more of the following signs & symptoms of infection both present and new to the patient? (!) Yes (Proceed)  -JG        Indicate SIRS criteria  Tachypnea > 20 resp per min; Altered mental status; Tachycardia > 90 bpm  -JG        If the answer is yes to both questions, suspicion of sepsis is present          If severe sepsis is present AND tissue hypoperfusion perists in the hour after fluid resuscitation or lactate > 4, the patient meets criteria for SEPTIC SHOCK          Are any of the following organ dysfunction criteria present within 6 hours of suspected infection and SIRS criteria that are NOT considered to be chronic conditions? (!) Yes  -JG        Organ dysfunction  Acute respiratory failure (new need for invasive or non-invasive mechanical ventilation)  -JG        Date of presentation of severe sepsis  10/21/19  -JG        Time of presentation of severe sepsis  1700  -JG        Tissue hypoperfusion persists in the hour after crystalloid fluid administration, evidenced, by either:          Was hypotension present within one hour of the conclusion of crystalloid fluid administration?    Patient presented to ICU with Acute Encephalopathy secondary to Acute Hypoxic Hypercapnic Respiratory Failure secondary to Acute Systolic Heart Failure and/or Pneumonia  -JG        Date of presentation of septic shock   Despite multiple attempts at venous access from 17:00 to 18:00, no periipheral IV or blood was able to be obtained; Emergent R femoral TLC was placed with US guidance  Only 1 of 2 BCs obtained prior to Abx dosing due to inability to obtain   -JG        Time of presentation of septic shock            User Key  (r) = Recorded By, (t) = Taken By, (c) = Cosigned By    Initials Name Provider Type    Aleks Tom PA-C Physician Assistant        Differential of Acute Encephalopathy & Acute Hypercapnic & Hypoxic respiratory Failure likely Acute systolic heart failure however in the differential includes pulmonary infection  Cultures & Antibiotics ordered  Clinically, patient appears volume overloaded  BiPAP placed on patient, Lasix 80 mg IV x 1, & Nitro gtt started to keep SBP < 140  No IVF given secondary to clincally etiology of acute pathology being heart failure, & not sepsis  Upon presentation, delay in obtaining blood work, ability to obtain only 1 of 2 Blood cultures prior to giving antibiotics, was secondary to inability to obtain peripheral venous access  Multiple unsuccessful attempts were made at peripheral IVs & blood draws from 17:00 to 18:15  Decision to place R femoral TLC  One blood culture was obtained from central venous stick  2nd blood culture unable to be obtained      Jagdish Sewell PA-C

## 2019-10-21 NOTE — PROCEDURES
Central Line Insertion  Date/Time: 10/21/2019 6:53 PM  Performed by: Ammy Christiansen PA-C  Authorized by: Ammy Christiansen PA-C     Patient location:  Bedside  Consent:     Consent obtained:  Verbal and emergent situation    Consent given by:  Patient and healthcare agent (consent obtained verbally from daughter)    Risks discussed:  Arterial puncture, incorrect placement, nerve damage, bleeding and infection    Alternatives discussed:  No treatment and delayed treatment  Universal protocol:     Procedure explained and questions answered to patient or proxy's satisfaction: yes      Required blood products, implants, devices, and special equipment available: yes (U/S machine at bedside)      Site/side marked: yes      Immediately prior to procedure, a time out was called: yes      Patient identity confirmed:  Arm band, hospital-assigned identification number and provided demographic data  Pre-procedure details:     Hand hygiene: Hand hygiene performed prior to insertion      Skin preparation:  ChloraPrep    Skin preparation agent: Skin preparation agent completely dried prior to procedure    Indications:     Central line indications: medications requiring central line and no peripheral vascular access    Anesthesia (see MAR for exact dosages):      Anesthesia method:  Local infiltration    Local anesthetic:  Lidocaine 1% w/o epi (10 ml)  Procedure details:     Location:  Right femoral    Vessel type: vein      Laterality:  Right    Approach: percutaneous technique used      Patient position:  Flat    Catheter type:  Triple lumen 16cm    Catheter size:  7 Fr    Landmarks identified: yes      Ultrasound guidance: yes      Sterile ultrasound techniques: Sterile gel and sterile probe covers were used      Number of attempts:  4    Successful placement: yes    Post-procedure details:     Post-procedure:  Dressing applied and line sutured    Assessment:  Blood return through all ports and free fluid flow    Post-procedure complications: none      Patient tolerance of procedure: Tolerated well, no immediate complications    Observer:  Yes

## 2019-10-21 NOTE — SOCIAL WORK
Met with Pt briefly, in hopes of reviewing the rehab routine and CM role, but Pt requested that CM return another time  Pt reports that she was having trouble breathing/catching her breath  CM notified nursing staff of the above  VM left for Pts daughter, Precious Hernandez , stating that CM wanted to introduce herself, and share information re: the rehab routine/CM role  CM requested a return call to gather some information re: Pt, as she wasn't up for speaking with CM earlier

## 2019-10-21 NOTE — PROGRESS NOTES
10/21/19 0815   Pain Assessment   Pain Assessment No/denies pain   Restrictions/Precautions   Precautions Fall Risk;Bed/chair alarms;O2;Pain;Supervision on toilet/commode   Cognitive Linguisitic Assessments   Ross Information Processing Assessment - Geriatric (RIPA-G) Pt completed the RIPA- to assess current cognitive linguistic skills  The following subtests were administered: Immediate Memory- Raw Score of which is equivalent to a Percentile Rank of >99, correlating to skill being WNL; Temporal Orientation- Raw Score of 36, equivalent to a Percentile Rank of 59, indicating skill being WNL; Spatial Orientation- Raw Score of 44 and a Percentile Rank of >99 correlating to skill being WNL; General Information- Raw Score of 46 and a Percentile Rank of 59 corresponding to skill being WNL; Situational Knowledge- Raw Score of 41 which is equivalent to a Percentile Rank of 59 and correlating to skill being WNL; Categorical Vocabulary- Raw Score of 39 w/ a Percentile Rank of 70 which indicates skill being WNL and Listening Comprehension-Raw Score of 60 which corresponds to a Percentile Rank of 91 and is equivalent to skill being WNL  Pt's Composite Index Score of 116 and Percentile Rank of 98 correlate to overall cognitive linguistic skills to be WNL when compared to age matched peers 80+ years in age  Comprehension   QI: Comprehension 4  Undestands: Clear comprehension without cues or repetitions   Comprehension (FIM) 6 - Has only MILD difficulty with complex/abstract info   Expression   QI: Expression 4   Express complex messages without difficulty and with speech that is clear and easy to [place]   Expression (FIM) 6 - Expresses complex/abstract but requires:  more time   Social Interaction   Social Interaction (FIM) 6 - Interacts appropriately with others BUT requires extra  time   Problem Solving   Problem solving (FIM) 6 - Solves complex problems BUT requires extra time   Memory   Memory (FIM) 6 - Recognizes with extra time   Speech/Language/Cognition Assessmetn   Treatment Assessment Pt completing formalized cognitive assessment, RIPA: G-2, where pt's overall percentile rank and composite index score when compared to age matched peers 80+ years deems pt to demonstrate cognitive linguistic skills to be WNL at this time  Pt was oriented x4 and when engaging in initial interview, pt's LTM biographical recall was WNL  Pt was also noted to verbalize daily events from admission initially at [address] [place] [address] and then up to this current hospitalization  Overall, pt is noted to demonstrate cognitive linguistic skills at premorbid level of functioning prior to hospitalizations  At this time, no further cognitive linguistic skills are warranted at this time, but pt will benefit from continued OT/PT services to maximize overall functional mobility  Please reconsult if any cognitive deficits present during pt's rehab stay  SLP Therapy Minutes   SLP Time In 36   SLP Time Out 0900   SLP Total Time (minutes) 45   SLP Mode of treatment - Individual (minutes) 45   SLP Mode of treatment - Concurrent (minutes) 0   SLP Mode of treatment - Group (minutes) 0   SLP Mode of treatment - Co-treat (minutes) 0   SLP Mode of Treatment - Total time(minutes) 45 minutes   SLP Cumulative Minutes 45   Therapy Time missed   Time missed?  No

## 2019-10-21 NOTE — PROGRESS NOTES
SLP TAA       10/21/19 0815   Patient Data   Rehab Impairment Traumatic closed injury   Etiologic Diagnosis Traumatic SDH s/p fall   Date of Onset 10/08/19   Prior Level of Function   Functional Cognition 3  Independent - Patient completed the activities by him/herself, with or without an assistive device, with no assistance from a helper  Prior Assistance Needed for Medication Management;Money Management   Restrictions/Precautions   Precautions Fall Risk;Bed/chair alarms;O2;Pain;Supervision on toilet/commode   Pain Assessment   Pain Assessment No/denies pain   Comprehension   Auditory Basic;Complex   Visual Basic;Complex   Findings Pt completing formalized cognitive assessment  Refer to SLP Rehab not for details  QI: Comprehension 4  Undestands: Clear comprehension without cues or repetitions   Comprehension (FIM) 6 - Has only MILD difficulty with complex/abstract info   Expression   Verbal Basic;Complex   Non-Verbal Basic;Complex   Intelligibility Sentence   Findings Pt completing formalized cognitive assessment  Refer to SLP Rehab not for details  QI: Expression 4  Express complex messages without difficulty and with speech that is clear and easy to [place]   Expression (FIM) 6 - Expresses complex/abstract but requires:  more time   Social Interaction   Cooperation with staff   Participation Individual   Behaviors observed Appropriate   Findings Pt completing formalized cognitive assessment  Refer to SLP Rehab not for details  Social Interaction (FIM) 6 - Interacts appropriately with others BUT requires extra  time   Problem Solving   Complex Manages discharge planning   Routine Manages call bell;Manges precautions   Findings Pt completing formalized cognitive assessment  Refer to SLP Rehab not for details      Problem solving (FIM) 6 - Solves complex problems BUT requires extra time   Memory   Remember Routine Yes   Initiates Tasks Yes   Short-Term Intact   Long Term Intact   Recalls Precaution Yes Findings Pt completing formalized cognitive assessment  Refer to SLP Rehab not for details  Memory (FIM) 6 - Recognizes with extra time   Discharge Information   Patient's Discharge Plan home w/ family support   Patient's Rehab Expectations "to get back home"   Barriers to Discharge Home Decreased Strength;Decreased Endurance;Pain   Impressions Pt completing formalized cognitive assessment, RIPA: G-2, where pt's overall percentile rank and composite index score when compared to age matched peers 80+ years deems pt to demonstrate cognitive linguistic skills to be WNL at this time  Pt was oriented x4 and when engaging in initial interview, pt's LTM biographical recall was WNL  Overall, pt is noted to demonstrate cognitive linguistic skills at premorbid level of functioning prior to hospitalizations  At this time, no further cognitive linguistic skills are warranted at this time, but pt will benefit from continued OT/PT services to maximize overall functional mobility  Please reconsult if any cognitive deficits present during pt's rehab stay     SLP Therapy Minutes   SLP Time In 36   SLP Time Out 0900   SLP Total Time (minutes) 45   SLP Mode of treatment - Individual (minutes) 45   SLP Mode of treatment - Concurrent (minutes) 0   SLP Mode of treatment - Group (minutes) 0   SLP Mode of treatment - Co-treat (minutes) 0   SLP Mode of Treatment - Total time(minutes) 45 minutes   SLP Cumulative Minutes 45   Cumulative Minutes   Cumulative therapy minutes 135

## 2019-10-21 NOTE — PROGRESS NOTES
Progress Note - Winnie Stager 1934, 80 y o  female MRN: [id number]    Unit/Bed#: -01 Encounter: [id number]    Primary Care Provider: Lenny Stone,    Date and time admitted to hospital: 10/19/2019  1:40 PM        Hyperlipidemia  Assessment & Plan  Patient currently not on statin  Recommend healthy lifestyle choices for your cholesterol  Low fat/low cholesterol diet  Limit/avoid red meat  Eat more lean meat - chicken breast, ground turkey, fish  Exercise 30 mins at least 5 times a week as tolerated  AZUL (obstructive sleep apnea)  Assessment & Plan  - non-compliant at home and has not used CPAP for 10 to 15 years now  -new order for sleep study  - will need to determine plan for OP follow up based on sleep study results    Anemia  Assessment & Plan  - per EMR present going back to 01/2019  - currently 8 8 improving, continue to trend  - patient reports iron deficiency anemia   Iron panel, B12 and folate wnl    Acute-on-chronic kidney injury ([facility] [address] )  Assessment & Plan  - baseline appears to be 1 0-1 2 however has not been below 1 5 since August   - currently 1 41 - torsemide switched to lasix 80mg bid due to pt refusing torsemide due to diarrhea  - recheck on 10/24  - nephrology follow patient:  ? 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 Check renal ultrasound for completeness  ?  Check vitamin-D level    S/P AVR  Assessment & Plan  - bioprosthetic valve   - nl fxn per last echo in EMR     Depression  Assessment & Plan  Well controlled on Zoloft  Requesting an order for xanax but will hold off on benzos for now to prevent any decline in resp status    Chronic diastolic heart failure [facility])  Assessment & Plan  Wt Readings from Last 3 Encounters:   10/21/19 87 5 kg (192 lb 14 4 oz)   10/17/19 86 2 kg (190 lb 0 6 oz)   10/10/19 84 3 kg (185 lb 14 4 oz)     -fluid overloaded monitor on lasix 80mg bid   - grade 2   - on chest XR of 10/17 vascular congestion and small b/l pleural effusions  - STAT CXR this morning   FINDINGS:  There are median sternotomy wires indicating prior cardiac surgery  The cardiac silhouette is enlarged  Hazy density at the left lung base is new suggesting effusion and/or consolidation  Slight blunting of the right costophrenic angle is seen  Interstitial prominence is unchanged  Right airspace opacity has resolved  Osseous structures appear within normal limits for patient age    IMPRESSION:  New small effusions with possible adjacent airspace consolidation in the left lung base      - BNP elevated 26, 100   - trace b/l le edema  - refused toresmide 40 this am, nephrology switched to lasix 80mg bid, first dose given with lunch  - hold off on O2 if patient o2 sat >93 %  -hold off on benzos at this time    Monitor closely           Essential hypertension  Assessment & Plan  Amlodipine 5mg daily  Metoprolol tartrate 25mg bid   Previously on torsemide 40mg daily but refused by pt due to diarrhea   Changed to lasix 80mg bid by nephrology team    BP prior to diuretic today minimally elevated today 149/78 will continue to monitor while on lasix     Abnormal CT scan, cervical spine  Assessment & Plan  - on CT c-spine of 10/8 report noted stable C6 anterior osteophyte fx and 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  - evaluated on 10/9 by neurosx and no intervention/further BENNETT recommended       H/O: CVA (cerebrovascular accident)  Assessment & Plan  - home plavix on hold due to extra-axial hemorrhage, per neurosx consult of 10/9 to be held for 2 wks until FU CT at which time neurosx will decide if plavix may be resumed  - intolerant to statins per EMR   - continue to monitor BP - slightly elevated today after pt refused torsemide, notified renal, awaiting possible change to lasix Hypothyroidism  Assessment & Plan  Most recent TSH normal   Patient currently on home dose of levothyroxine 112mcg  * SDH (subdural hematoma) (HCC)  Assessment & Plan  - right sdh  - per last neurosx note of 10/9 hold all blood thinners for 2 wks and then FU CT at 2 wks time to determine if blood thinners can be resumed   - was on HSQ in acute care for DVT ppx however will use SCDs only until verbal clearance from neurosx which, will need to obtain clearance from neurosurgery   - completed 14 doses of keppra for sz ppx, need to confirm with neurosurgery if patient needs to continue on keppra      VTE Pharmacologic Prophylaxis:   Pharmacologic: SCDs only  Mechanical VTE Prophylaxis in Place: Yes    Time Spent for Care: 30 minutes  More than 50% of total time spent on counseling and coordination of care as described above  Current Length of Stay: 2 day(s)    Current Patient Status: Inpatient Rehab   Certification Statement: The patient will continue to require additional inpatient rehabilitation    Discharge Plan: As per treatment team     Code Status: Level 1 - Full Code    Subjective:   Patient sitting in wheelchair in room  She did not keep her CPAP mask on overnight and complained of shortness of breath and wheezing  Per nursing her O2 sat remained above 90% on room air  She was started on O2 2L nasal cannula and was asking nursing about being on home oxygen after discharge  Patient was asking about possibly having a xanax at night time to keep her calmer  She complains of being uncomfortable due to wheezing, SOB, and chest tightness  She denies any chest pain or heart palpitations  She does not notice any increased swelling       Objective:     Vitals:   Temp (24hrs), Av 7 °F (36 5 °C), Min:97 1 °F (36 2 °C), Max:98 2 °F (36 8 °C)    Temp:  [97 1 °F (36 2 °C)-98 2 °F (36 8 °C)] 98 2 °F (36 8 °C)  HR:  [56-72] 72  Resp:  [16-19] 16  BP: (116-149)/(55-78) 149/78  SpO2:  [94 %-98 %] 94 %  Body mass index is 38 96 kg/m²  Review of Systems   Constitutional: Negative for appetite change, chills, fever and unexpected weight change  Respiratory: Positive for chest tightness, shortness of breath and wheezing  Negative for cough  Cardiovascular: Positive for leg swelling (not worse)  Negative for chest pain and palpitations  Gastrointestinal: Negative for abdominal distention, abdominal pain, constipation, diarrhea, nausea and vomiting  Neurological: Negative for dizziness, weakness, light-headedness and headaches  Input and Output Summary (last 24 hours): Intake/Output Summary (Last 24 hours) at 10/21/2019 1400  Last data filed at 10/21/2019 1300  Gross per 24 hour   Intake 330 ml   Output    Net 330 ml       Physical Exam:     Physical Exam   Constitutional: She is oriented to person, place, and time  She appears well-developed and well-nourished  No distress  Obese  Sitting at bedside in wheelchair   HENT:   Head: Normocephalic and atraumatic  Eyes: Pupils are equal, round, and reactive to light  Conjunctivae and EOM are normal    Cardiovascular: Normal rate, regular rhythm and normal heart sounds  Pulmonary/Chest: Effort normal  No stridor  She has decreased breath sounds (decreased in bases)  She has wheezes (diffuse throughout all lung fields)  She has no rhonchi  She has rales in the right lower field and the left lower field  Abdominal: Soft  Bowel sounds are normal  She exhibits no distension and no mass  There is no tenderness  There is no guarding  Musculoskeletal: She exhibits edema (trace bilateral LE edema)  Neurological: She is alert and oriented to person, place, and time  Skin: Skin is warm and dry  She is not diaphoretic  Psychiatric: She has a normal mood and affect  Her behavior is normal    Vitals reviewed        Additional Data:     Labs:    Results from last 7 days   Lab Units 10/21/19  0533 10/18/19  0513   WBC Thousand/uL  --  6 25   HEMOGLOBIN g/dL 8  8* 8 3*   HEMATOCRIT % 26 4* 26 9*   PLATELETS Thousands/uL  --  193   NEUTROS PCT %  --  62   LYMPHS PCT %  --  26   MONOS PCT %  --  8   EOS PCT %  --  3     Results from last 7 days   Lab Units 10/21/19  0533   SODIUM mmol/L 134*   POTASSIUM mmol/L 4 3   CHLORIDE mmol/L 94*   CO2 mmol/L 32*   BUN mg/dL 46*   CREATININE mg/dL 1 41*   ANION GAP mmol/L 8   CALCIUM mg/dL 9 1   GLUCOSE RANDOM mg/dL 104*         Results from last 7 days   Lab Units 10/21/19  0650 10/18/19  1116 10/18/19  0744 10/17/19  2123   POC GLUCOSE mg/dl 103 119 125 176*                   * I Have Reviewed All Lab Data Listed Above  * Additional Pertinent Lab Tests Reviewed: All Labs Within Last 24 Hours Reviewed    Imaging:    Imaging Reports Reviewed Today Include: CXR  Imaging Personally Reviewed by Myself Includes:  CXR    Recent Cultures (last 7 days):           Last 24 Hours Medication List:     Current Facility-Administered Medications:  acetaminophen 650 mg Oral Q6H PRN Alondra Wray MD   albuterol 2 puff Inhalation Q4H PRN Anton Shrestha MD   albuterol 2 5 mg Nebulization Q6H PRN NATHEN Matos   amLODIPine 5 mg Oral QAM Alondra Wray MD   furosemide 80 mg Oral BID (diuretic) NATHEN Degroot   gabapentin 300 mg Oral TID Alondra Wray MD   levothyroxine 112 mcg Oral Daily Alondra Wray MD   lidocaine 1 patch Topical Daily PRN Anton Shrestha MD   methocarbamol 500 mg Oral Q8H PRN Alondra Wray MD   metoprolol tartrate 25 mg Oral Q12H Reinaldo Kamara MD   nystatin  Topical BID Alondra Wray MD   polyethylene glycol 17 g Oral Daily PRN Alondra Wray MD   sertraline 25 mg Oral HS Alondra Wray MD        Today, Patient Was Seen By: NATHEN Humphrey*Modal software was used to dictate this note  It may contain errors with dictating incorrect words or incorrect spelling  Please contact the provider directly with any questions

## 2019-10-21 NOTE — ASSESSMENT & PLAN NOTE
· Initial presents to the emergency department on 10/8/19 after a fall from standing without loss of consciousness  · Most recent [facility] on 10/12/19 noted stable right parafalcine extra-axial hemorrhage  · Stat CT head ordered and pending  · Serial neuro exams  · Continuing to hold all blood thinners, ASA, pharmacologic DVT prophylaxis  · Check stat PT/INR

## 2019-10-21 NOTE — PLAN OF CARE
Problem: Prexisting or High Potential for Compromised Skin Integrity  Goal: Skin integrity is maintained or improved  Description  INTERVENTIONS:  - Identify patients at risk for skin breakdown  - Assess and monitor skin integrity  - Assess and monitor nutrition and hydration status  - Monitor labs   - Assess for incontinence   - Turn and reposition patient  - Assist with mobility/ambulation  - Relieve pressure over bony prominences  - Avoid friction and shearing  - Provide appropriate hygiene as needed including keeping skin clean and dry  - Evaluate need for skin moisturizer/barrier cream  - Collaborate with interdisciplinary team   - Patient/family teaching  - Consider wound care consult   Outcome: Progressing     Problem: Potential for Falls  Goal: Patient will remain free of falls  Description  INTERVENTIONS:  - Assess patient frequently for physical needs  -  Identify cognitive and physical deficits and behaviors that affect risk of falls    -  Edwards fall precautions as indicated by assessment   - Educate patient/family on patient safety including physical limitations  - Instruct patient to call for assistance with activity based on assessment  - Modify environment to reduce risk of injury  - Consider OT/PT consult to assist with strengthening/mobility  Outcome: Progressing

## 2019-10-21 NOTE — ASSESSMENT & PLAN NOTE
· Baseline hemoglobin appears to be 9-10  · Last hemoglobin this morning 8 8  · No overt signs and symptoms of bleeding -- continue to monitor  · Trend on serial hemoglobins  · Plan for transfusion if hemoglobin falls less than 7

## 2019-10-21 NOTE — UTILIZATION REVIEW
Notification of Discharge  This is a Notification of Discharge from our facility [address]  Please be advised that this patient has been discharge from our facility  Below you will find the admission and discharge date and time including the patients disposition  PRESENTATION DATE: 10/12/2019 11:07 AM  OBS ADMISSION DATE:   IP ADMISSION DATE: 10/12/19 1502   DISCHARGE DATE: 10/19/2019  1:16 PM  DISPOSITION: [address] [place] [address] Dr [facility]   Admission Orders listed below:  Admission Orders (From admission, onward)     Ordered        10/12/19 1518  Inpatient Admission  Once                   Please contact the UR Department if additional information is required to close this patient's authorization/case  [facility] Utilization Review Department  Phone: [phone]; Fax [phone]  [email or web address]  ATTENTION: Please call with any questions or concerns to [phone]  and carefully listen to the prompts so that you are directed to the right person  Send all requests for admission clinical reviews, approved or denied determinations and any other requests to fax [phone]   All voicemails are confidential

## 2019-10-21 NOTE — ASSESSMENT & PLAN NOTE
· On home Synthroid p o , currently being held respiratory status -- plan to restart as soon as able  · Check TSH stat

## 2019-10-21 NOTE — ASSESSMENT & PLAN NOTE
- non-compliant at home and has not used CPAP for 10 to 15 years now  -new order for sleep study  - will need to determine plan for OP follow up based on sleep study results

## 2019-10-21 NOTE — NURSING NOTE
Patient this am was wheezing and short of breath inhaler used really any help nurse practioner came took her into her room she ordered breathing treatment which she had no real results noted portable cxr done  She did not want to take her demadex due to her saying gets diarerrhea dr Johnan Dawson in later ordered bnp which was 26,000 both nurse practioner and dr Francisco Grande aware patient still remains short of breath ordered po lasix given at 1230 patient  She did void x1 in the bathroom but missed the hat  Patient getting more anxious wanted ativan no order given patient was put into bed after using commode for bm was even more uncomfortable and more anxious  Calling out and went to try to keep her calm nurse practioner came back into see patient took o2 off for short time sats dropped into to 80's put back on o2 finally got order for patient to be tranfer to the unit via bed had nurse manager in earlier to she that patient was short of breath even at rest belongings went up with patient

## 2019-10-21 NOTE — RESPIRATORY THERAPY NOTE
1800- Pt in respiratory distress  Placed on bipap with settings per flow sheet  ABG drawn rr on bipap increased to 18 after results  Pt voices mild relief   Plan is to re eval in 1 hour and possible intubation if no improvement

## 2019-10-22 ENCOUNTER — APPOINTMENT (INPATIENT)
Dept: NON INVASIVE DIAGNOSTICS | Facility: HOSPITAL | Age: 84
DRG: 280 | End: 2019-10-22
Payer: COMMERCIAL

## 2019-10-22 ENCOUNTER — APPOINTMENT (INPATIENT)
Dept: RADIOLOGY | Facility: HOSPITAL | Age: 84
DRG: 280 | End: 2019-10-22
Payer: COMMERCIAL

## 2019-10-22 ENCOUNTER — APPOINTMENT (OUTPATIENT)
Dept: PHYSICAL THERAPY | Age: 84
End: 2019-10-22
Payer: COMMERCIAL

## 2019-10-22 PROBLEM — R26.81 GAIT INSTABILITY: Status: RESOLVED | Noted: 2019-10-20 | Resolved: 2019-10-22

## 2019-10-22 PROBLEM — G47.33 OSA (OBSTRUCTIVE SLEEP APNEA): Chronic | Status: ACTIVE | Noted: 2019-10-19

## 2019-10-22 PROBLEM — E55.9 VITAMIN D DEFICIENCY: Chronic | Status: ACTIVE | Noted: 2017-08-22

## 2019-10-22 PROBLEM — R53.1 WEAKNESS: Status: RESOLVED | Noted: 2019-10-20 | Resolved: 2019-10-22

## 2019-10-22 PROBLEM — IMO0002 HISTORY OF RENAL STENT: Status: ACTIVE | Noted: 2017-02-08

## 2019-10-22 PROBLEM — G62.9 PERIPHERAL POLYNEUROPATHY: Chronic | Status: ACTIVE | Noted: 2017-02-07

## 2019-10-22 PROBLEM — IMO0002 HISTORY OF RENAL STENT: Chronic | Status: ACTIVE | Noted: 2017-02-08

## 2019-10-22 PROBLEM — G62.9 PERIPHERAL POLYNEUROPATHY: Status: ACTIVE | Noted: 2017-02-07

## 2019-10-22 PROBLEM — M32.9 SLE (SYSTEMIC LUPUS ERYTHEMATOSUS) (HCC): Chronic | Status: ACTIVE | Noted: 2017-10-24

## 2019-10-22 PROBLEM — R93.7 ABNORMAL CT SCAN, CERVICAL SPINE: Status: RESOLVED | Noted: 2019-01-09 | Resolved: 2019-10-22

## 2019-10-22 PROBLEM — Z98.1 H/O SPINAL FUSION: Status: ACTIVE | Noted: 2019-07-23

## 2019-10-22 PROBLEM — E11.40 CONTROLLED TYPE 2 DIABETES MELLITUS WITH DIABETIC NEUROPATHY, WITHOUT LONG-TERM CURRENT USE OF INSULIN (HCC): Chronic | Status: ACTIVE | Noted: 2017-10-24

## 2019-10-22 PROBLEM — Z98.1 H/O SPINAL FUSION: Chronic | Status: ACTIVE | Noted: 2019-07-23

## 2019-10-22 PROBLEM — F32.A DEPRESSION: Chronic | Status: ACTIVE | Noted: 2019-01-17

## 2019-10-22 PROBLEM — E55.9 VITAMIN D DEFICIENCY: Status: ACTIVE | Noted: 2017-08-22

## 2019-10-22 PROBLEM — I10 ESSENTIAL HYPERTENSION: Chronic | Status: ACTIVE | Noted: 2019-01-09

## 2019-10-22 LAB
ALBUMIN SERPL BCP-MCNC: 3.4 G/DL (ref 3–5.2)
ALP SERPL-CCNC: 72 U/L (ref 43–122)
ALT SERPL W P-5'-P-CCNC: 32 U/L (ref 9–52)
ANION GAP SERPL CALCULATED.3IONS-SCNC: 7 MMOL/L (ref 5–14)
AST SERPL W P-5'-P-CCNC: 50 U/L (ref 14–36)
ATRIAL RATE: 80 BPM
BASOPHILS # BLD AUTO: 0 THOUSANDS/ΜL (ref 0–0.1)
BASOPHILS NFR BLD AUTO: 1 % (ref 0–1)
BILIRUB SERPL-MCNC: 0.7 MG/DL
BUN SERPL-MCNC: 52 MG/DL (ref 5–25)
CALCIUM SERPL-MCNC: 8.7 MG/DL (ref 8.4–10.2)
CHLORIDE SERPL-SCNC: 94 MMOL/L (ref 97–108)
CO2 SERPL-SCNC: 32 MMOL/L (ref 22–30)
CREAT SERPL-MCNC: 1.63 MG/DL (ref 0.6–1.2)
EOSINOPHIL # BLD AUTO: 0 THOUSAND/ΜL (ref 0–0.4)
EOSINOPHIL NFR BLD AUTO: 1 % (ref 0–6)
ERYTHROCYTE [DISTWIDTH] IN BLOOD BY AUTOMATED COUNT: 14.9 %
ERYTHROCYTE [DISTWIDTH] IN BLOOD BY AUTOMATED COUNT: 15.1 %
GFR SERPL CREATININE-BSD FRML MDRD: 29 ML/MIN/1.73SQ M
GLUCOSE SERPL-MCNC: 131 MG/DL (ref 65–140)
GLUCOSE SERPL-MCNC: 133 MG/DL (ref 65–140)
GLUCOSE SERPL-MCNC: 136 MG/DL (ref 65–140)
GLUCOSE SERPL-MCNC: 79 MG/DL (ref 65–140)
GLUCOSE SERPL-MCNC: 80 MG/DL (ref 70–99)
GLUCOSE SERPL-MCNC: 88 MG/DL (ref 65–140)
HCT VFR BLD AUTO: 23.4 % (ref 36–46)
HCT VFR BLD AUTO: 24 % (ref 36–46)
HGB BLD-MCNC: 7.9 G/DL (ref 12–16)
HGB BLD-MCNC: 8 G/DL (ref 12–16)
L PNEUMO1 AG UR QL IA.RAPID: NEGATIVE
LACTATE SERPL-SCNC: 1 MMOL/L (ref 0.7–2)
LYMPHOCYTES # BLD AUTO: 1.3 THOUSANDS/ΜL (ref 0.5–4)
LYMPHOCYTES NFR BLD AUTO: 18 % (ref 25–45)
MAGNESIUM SERPL-MCNC: 2.1 MG/DL (ref 1.6–2.3)
MCH RBC QN AUTO: 33.3 PG (ref 26.8–34.3)
MCH RBC QN AUTO: 33.4 PG (ref 26.8–34.3)
MCHC RBC AUTO-ENTMCNC: 33.4 G/DL (ref 31.4–37.4)
MCHC RBC AUTO-ENTMCNC: 33.7 G/DL (ref 31.4–37.4)
MCV RBC AUTO: 100 FL (ref 80–100)
MCV RBC AUTO: 99 FL (ref 80–100)
MONOCYTES # BLD AUTO: 0.5 THOUSAND/ΜL (ref 0.2–0.9)
MONOCYTES NFR BLD AUTO: 7 % (ref 1–10)
NEUTROPHILS # BLD AUTO: 5 THOUSANDS/ΜL (ref 1.8–7.8)
NEUTS SEG NFR BLD AUTO: 74 % (ref 45–65)
P AXIS: 50 DEGREES
PHOSPHATE SERPL-MCNC: 5.9 MG/DL (ref 2.5–4.8)
PLATELET # BLD AUTO: 195 THOUSANDS/UL (ref 150–450)
PLATELET # BLD AUTO: 204 THOUSANDS/UL (ref 150–450)
PMV BLD AUTO: 6.9 FL (ref 8.9–12.7)
PMV BLD AUTO: 7.1 FL (ref 8.9–12.7)
POTASSIUM SERPL-SCNC: 4.5 MMOL/L (ref 3.6–5)
PR INTERVAL: 204 MS
PROCALCITONIN SERPL-MCNC: 1.19 NG/ML
PROT SERPL-MCNC: 6.5 G/DL (ref 5.9–8.4)
QRS AXIS: -88 DEGREES
QRSD INTERVAL: 102 MS
QT INTERVAL: 390 MS
QTC INTERVAL: 449 MS
RBC # BLD AUTO: 2.37 MILLION/UL (ref 4–5.2)
RBC # BLD AUTO: 2.4 MILLION/UL (ref 4–5.2)
S PNEUM AG UR QL: NEGATIVE
SODIUM SERPL-SCNC: 133 MMOL/L (ref 137–147)
T WAVE AXIS: 105 DEGREES
TROPONIN I SERPL-MCNC: 2.85 NG/ML (ref 0–0.03)
VENTRICULAR RATE: 80 BPM
WBC # BLD AUTO: 6.2 THOUSAND/UL (ref 4.31–10.16)
WBC # BLD AUTO: 6.9 THOUSAND/UL (ref 4.31–10.16)

## 2019-10-22 PROCEDURE — 93970 EXTREMITY STUDY: CPT | Performed by: SURGERY

## 2019-10-22 PROCEDURE — 99222 1ST HOSP IP/OBS MODERATE 55: CPT | Performed by: INTERNAL MEDICINE

## 2019-10-22 PROCEDURE — G8978 MOBILITY CURRENT STATUS: HCPCS

## 2019-10-22 PROCEDURE — 99233 SBSQ HOSP IP/OBS HIGH 50: CPT | Performed by: INTERNAL MEDICINE

## 2019-10-22 PROCEDURE — 94760 N-INVAS EAR/PLS OXIMETRY 1: CPT

## 2019-10-22 PROCEDURE — 84484 ASSAY OF TROPONIN QUANT: CPT | Performed by: PHYSICIAN ASSISTANT

## 2019-10-22 PROCEDURE — 85027 COMPLETE CBC AUTOMATED: CPT | Performed by: PHYSICIAN ASSISTANT

## 2019-10-22 PROCEDURE — 83735 ASSAY OF MAGNESIUM: CPT | Performed by: PHYSICIAN ASSISTANT

## 2019-10-22 PROCEDURE — NC001 PR NO CHARGE: Performed by: INTERNAL MEDICINE

## 2019-10-22 PROCEDURE — 80053 COMPREHEN METABOLIC PANEL: CPT | Performed by: PHYSICIAN ASSISTANT

## 2019-10-22 PROCEDURE — 84145 PROCALCITONIN (PCT): CPT | Performed by: NURSE PRACTITIONER

## 2019-10-22 PROCEDURE — 93306 TTE W/DOPPLER COMPLETE: CPT | Performed by: INTERNAL MEDICINE

## 2019-10-22 PROCEDURE — 97163 PT EVAL HIGH COMPLEX 45 MIN: CPT

## 2019-10-22 PROCEDURE — 94640 AIRWAY INHALATION TREATMENT: CPT

## 2019-10-22 PROCEDURE — 94660 CPAP INITIATION&MGMT: CPT

## 2019-10-22 PROCEDURE — 93010 ELECTROCARDIOGRAM REPORT: CPT | Performed by: INTERNAL MEDICINE

## 2019-10-22 PROCEDURE — 93970 EXTREMITY STUDY: CPT

## 2019-10-22 PROCEDURE — 84100 ASSAY OF PHOSPHORUS: CPT | Performed by: PHYSICIAN ASSISTANT

## 2019-10-22 PROCEDURE — 82948 REAGENT STRIP/BLOOD GLUCOSE: CPT

## 2019-10-22 PROCEDURE — C8929 TTE W OR WO FOL WCON,DOPPLER: HCPCS

## 2019-10-22 PROCEDURE — 83605 ASSAY OF LACTIC ACID: CPT | Performed by: PHYSICIAN ASSISTANT

## 2019-10-22 PROCEDURE — 97167 OT EVAL HIGH COMPLEX 60 MIN: CPT

## 2019-10-22 PROCEDURE — 71045 X-RAY EXAM CHEST 1 VIEW: CPT

## 2019-10-22 PROCEDURE — 85025 COMPLETE CBC W/AUTO DIFF WBC: CPT | Performed by: PHYSICIAN ASSISTANT

## 2019-10-22 RX ORDER — AMLODIPINE BESYLATE 5 MG/1
5 TABLET ORAL DAILY
Status: DISCONTINUED | OUTPATIENT
Start: 2019-10-22 | End: 2019-10-23

## 2019-10-22 RX ORDER — FUROSEMIDE 10 MG/ML
80 INJECTION INTRAMUSCULAR; INTRAVENOUS EVERY 8 HOURS
Status: COMPLETED | OUTPATIENT
Start: 2019-10-22 | End: 2019-10-22

## 2019-10-22 RX ORDER — GABAPENTIN 300 MG/1
300 CAPSULE ORAL 2 TIMES DAILY
Status: DISCONTINUED | OUTPATIENT
Start: 2019-10-22 | End: 2019-10-27

## 2019-10-22 RX ORDER — LEVALBUTEROL INHALATION SOLUTION 1.25 MG/3ML
1.25 SOLUTION RESPIRATORY (INHALATION)
Status: DISCONTINUED | OUTPATIENT
Start: 2019-10-22 | End: 2019-10-22 | Stop reason: CLARIF

## 2019-10-22 RX ORDER — HYDROCODONE BITARTRATE AND ACETAMINOPHEN 5; 325 MG/1; MG/1
1 TABLET ORAL EVERY 6 HOURS PRN
Status: DISCONTINUED | OUTPATIENT
Start: 2019-10-22 | End: 2019-10-28 | Stop reason: HOSPADM

## 2019-10-22 RX ORDER — HYDROCODONE BITARTRATE AND ACETAMINOPHEN 5; 325 MG/1; MG/1
2 TABLET ORAL EVERY 6 HOURS PRN
Status: DISCONTINUED | OUTPATIENT
Start: 2019-10-22 | End: 2019-10-28 | Stop reason: HOSPADM

## 2019-10-22 RX ORDER — ACETAMINOPHEN 325 MG/1
650 TABLET ORAL EVERY 6 HOURS PRN
Status: DISCONTINUED | OUTPATIENT
Start: 2019-10-22 | End: 2019-10-28 | Stop reason: HOSPADM

## 2019-10-22 RX ORDER — FUROSEMIDE 10 MG/ML
80 INJECTION INTRAMUSCULAR; INTRAVENOUS ONCE
Status: COMPLETED | OUTPATIENT
Start: 2019-10-22 | End: 2019-10-22

## 2019-10-22 RX ORDER — LEVALBUTEROL 1.25 MG/.5ML
SOLUTION, CONCENTRATE RESPIRATORY (INHALATION)
Status: DISPENSED
Start: 2019-10-22 | End: 2019-10-22

## 2019-10-22 RX ORDER — PANTOPRAZOLE SODIUM 40 MG/1
40 TABLET, DELAYED RELEASE ORAL
Status: DISCONTINUED | OUTPATIENT
Start: 2019-10-22 | End: 2019-10-28 | Stop reason: HOSPADM

## 2019-10-22 RX ORDER — HEPARIN SODIUM 5000 [USP'U]/ML
5000 INJECTION, SOLUTION INTRAVENOUS; SUBCUTANEOUS EVERY 8 HOURS SCHEDULED
Status: DISCONTINUED | OUTPATIENT
Start: 2019-10-22 | End: 2019-10-28 | Stop reason: HOSPADM

## 2019-10-22 RX ORDER — LEVALBUTEROL 1.25 MG/.5ML
1.25 SOLUTION, CONCENTRATE RESPIRATORY (INHALATION)
Status: DISCONTINUED | OUTPATIENT
Start: 2019-10-22 | End: 2019-10-28 | Stop reason: HOSPADM

## 2019-10-22 RX ADMIN — HEPARIN SODIUM 5000 UNITS: 5000 INJECTION INTRAVENOUS; SUBCUTANEOUS at 22:02

## 2019-10-22 RX ADMIN — FUROSEMIDE 80 MG: 10 INJECTION, SOLUTION INTRAVENOUS at 07:44

## 2019-10-22 RX ADMIN — LEVALBUTEROL HYDROCHLORIDE 1.25 MG: 1.25 SOLUTION, CONCENTRATE RESPIRATORY (INHALATION) at 13:52

## 2019-10-22 RX ADMIN — METOPROLOL TARTRATE 25 MG: 25 TABLET, FILM COATED ORAL at 21:48

## 2019-10-22 RX ADMIN — HEPARIN SODIUM 5000 UNITS: 5000 INJECTION INTRAVENOUS; SUBCUTANEOUS at 15:29

## 2019-10-22 RX ADMIN — IPRATROPIUM BROMIDE 0.5 MG: 0.5 SOLUTION RESPIRATORY (INHALATION) at 13:52

## 2019-10-22 RX ADMIN — FUROSEMIDE 40 MG: 10 INJECTION, SOLUTION INTRAVENOUS at 00:14

## 2019-10-22 RX ADMIN — LEVALBUTEROL HYDROCHLORIDE 1.25 MG: 1.25 SOLUTION, CONCENTRATE RESPIRATORY (INHALATION) at 08:09

## 2019-10-22 RX ADMIN — LEVALBUTEROL HYDROCHLORIDE 1.25 MG: 1.25 SOLUTION, CONCENTRATE RESPIRATORY (INHALATION) at 19:23

## 2019-10-22 RX ADMIN — IPRATROPIUM BROMIDE 0.5 MG: 0.5 SOLUTION RESPIRATORY (INHALATION) at 02:42

## 2019-10-22 RX ADMIN — AMLODIPINE BESYLATE 5 MG: 5 TABLET ORAL at 09:41

## 2019-10-22 RX ADMIN — ACETAMINOPHEN 650 MG: 325 TABLET ORAL at 15:41

## 2019-10-22 RX ADMIN — GABAPENTIN 300 MG: 300 CAPSULE ORAL at 17:59

## 2019-10-22 RX ADMIN — LEVALBUTEROL HYDROCHLORIDE 1.25 MG: 1.25 SOLUTION RESPIRATORY (INHALATION) at 02:42

## 2019-10-22 RX ADMIN — FUROSEMIDE 80 MG: 10 INJECTION, SOLUTION INTRAVENOUS at 15:28

## 2019-10-22 RX ADMIN — FUROSEMIDE 80 MG: 10 INJECTION, SOLUTION INTRAVENOUS at 22:04

## 2019-10-22 RX ADMIN — HEPARIN SODIUM 5000 UNITS: 5000 INJECTION INTRAVENOUS; SUBCUTANEOUS at 09:42

## 2019-10-22 RX ADMIN — IPRATROPIUM BROMIDE 0.5 MG: 0.5 SOLUTION RESPIRATORY (INHALATION) at 08:10

## 2019-10-22 RX ADMIN — GABAPENTIN 300 MG: 300 CAPSULE ORAL at 09:41

## 2019-10-22 RX ADMIN — IPRATROPIUM BROMIDE 0.5 MG: 0.5 SOLUTION RESPIRATORY (INHALATION) at 19:23

## 2019-10-22 RX ADMIN — METOPROLOL TARTRATE 25 MG: 25 TABLET, FILM COATED ORAL at 09:41

## 2019-10-22 RX ADMIN — NYSTATIN: 100000 POWDER TOPICAL at 09:42

## 2019-10-22 RX ADMIN — PANTOPRAZOLE SODIUM 40 MG: 40 TABLET, DELAYED RELEASE ORAL at 07:45

## 2019-10-22 RX ADMIN — SERTRALINE HYDROCHLORIDE 25 MG: 50 TABLET ORAL at 21:56

## 2019-10-22 RX ADMIN — PERFLUTREN 1.6 ML/MIN: 6.52 INJECTION, SUSPENSION INTRAVENOUS at 15:11

## 2019-10-22 NOTE — ASSESSMENT & PLAN NOTE
· On home Norvasc p o , currently being held for respiratory status  · Systolic blood pressure 973 on arrival to ICU, started on nitroglycerin drip, titratable, with systolic blood pressure goal less than 140  · Home metoprolol converted to IV -- anticipate need to convert back to PO once able

## 2019-10-22 NOTE — NURSING NOTE
Pt is somewhat more agitated with forgetful and fidgety behavior  Disoriented to time  Moves poorly in bed  C o pain but does not rate  Very tender to right groin  Golf ball sized mobile mass noted medial to CVP site that is palpable while pt is left side lying  Lungs are less wheezy and now more coarse  Pt is frequently sneezing

## 2019-10-22 NOTE — DISCHARGE SUMMARY
Discharge Summary - PMR   Winnie Florence 80 y o  female MRN: [id number]  Unit/Bed#: [facility] 270-01 Encounter: [id number]    Admission Date: 10/19/2019     Discharge Date: 10/21/2019    Etiologic/Rehabilitation Diagnosis: Impairment of mobility, safety and Activities of Daily Living (ADLs) due to Brain Dysfunction:  02 22  Traumatic, Closed Injury    Per admitting HPI: "Winnie Florence is a 80 y o  female who presented to the [address] [place] [address] with weakness after SDH which was managed non-operatively, stable per most recent [facility]  Noted to have acute on chronic renal failure for which renal has been consulted " Accepted to [facility] on 10/19/19  Procedures Performed During [facility] Admission: None    Acute Rehabilitation Center Course: Patient required transfer to acute hospital  Patient with signficant respiratory distress, found to have [place] 387[address], CXR with evidence of overload  Patient to be transferred to acute service as medical needs outweigh rehabilitation needs  Discussed with SLIM provider, Dr Ramin Paez  See below for care during [facility] stay:     * SDH (subdural hematoma) (HCC)  Assessment & Plan  · Acute comprehensive interdisciplinary inpatient rehabilitation including PT, OT, and/or SLP, RN, CM, SW, dietary, psychology, etc   · Goal: modified independent  · right sdh  · per last neurosx note of 10/9 hold all blood thinners for 2 wks and then FU CTH at 2 wks time to determine if blood thinners can be resumed  Repeat CTOH ordered for 10/23  Awaiting neurosurgery call-back regarding DVT prophylaxis with heparin     · 2 week keppra course completed    AZUL (obstructive sleep apnea)  Assessment & Plan  - non-compliant at home and has not used CPAP for 10 to 15 years now    Anemia  Assessment & Plan  Results from last 7 days   Lab Units 10/21/19  0533 10/18/19  0513 10/17/19  0443   HEMOGLOBIN g/dL 8 8* 8 3* 8 7*     · Monitor CBC intermittently  · Transfuse for Hgb <7    Acute-on-chronic kidney injury [facility])  Assessment & Plan  Results from last 7 days   Lab Units 10/21/19  0533 10/18/19  0513 10/17/19  0443   CREATININE mg/dL 1 41* 1 53* 1 50*       · Continue monitoring kidney function via intermittent BMP  · Avoid nephrotoxic meds/NSAIDs  · Encourage fluid intake  · Nephrology team following, will appreciate recs    History of aortic valve replacement  Assessment & Plan  - bioprosthetic valve   - nl fxn per last echo in EMR     Depression  Assessment & Plan  - zoloft     Acute on chronic diastolic heart failure (HCC)  Assessment & Plan  · Most recent EF: 55%, with moderate hypokinesis of mid anteroseptal walls noted  Patient with grade 2 diastolic dysfunction  · 10/17 CXR shows vascular congestion and small b/l pleural effusions  · Repeat CXR performed today, shows new small effusions and possible airspace consolidation in LL base  · BNP at [address]  · Patient refused torsemide as patient feels it was giving her loose stools  · Renal has switched her to lasix  · Holding therapy today due to SOB      Essential hypertension  Assessment & Plan  Temp:  [97 1 °F (36 2 °C)-98 2 °F (36 8 °C)] 98 2 °F (36 8 °C)  HR:  [56-72] 72  Resp:  [16-19] 16  BP: (116-149)/(55-78) 149/78    · Continue Amlodipine  · Continue Metoprolol   · IM service managing anti-hypertensives, adjusting as needed    H/O: CVA (cerebrovascular accident)  Assessment & Plan  - home plavix on hold due to extra-axial hemorrhage, per neurosx consult of 10/9 to be held for 2 wks until FU [facility] at which time neurosx will decide based on FU CTH if plavix may be resumed  - intolerant to statins per EMR     Hypothyroidism  Assessment & Plan  - levothyroxine     Abnormal CT scan, cervical spineresolved as of 10/22/2019  Assessment & Plan  - on CT c-spine of 10/8 report noted stable C6 anterior osteophyte fx and 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  - evaluated on 10/9 by neurosx and no intervention/further BENNETT recommended   F/u as outpatient  Discharge Physical Examination:  General: alert cooperative  HEENT:  Head: Normocephalic, no lesions, without obvious abnormality  LUNGS:  expiratory wheezing, bibasilar crepitations noted  tachypnic  ABDOMEN:  soft, non-tender  Bowel sounds normal  No masses, no organomegaly  EXTREMITIES:  extremities normal, warm and well-perfused; no cyanosis, clubbing, or edema  NEURO:   mental status, speech normal, alert and oriented x3  PSYCH:  Alert and oriented, appropriate affect  Significant Findings, Care, Treatment and Services Provided: Acute comprehensive interdisciplinary inpatient rehabilitation including PT, OT, SLP, RN, CM, SW, dietary, psychology, etc     Complications: see above    Functional Status Upon Admission to [facility]:  Mobility: min 30 feet   Transfers: mod  ADLs: mod-max    Discharge Diagnosis: Impairment of mobility, safety and Activities of Daily Living (ADLs) due to Brain Dysfunction:  02 22  Traumatic, Closed Injury  Medically Complex:  17 9  Other Medically Complex Condition: acute on chronic CHF    Discharge Medications:   See after visit summary for reconciled discharge medications provided to patient and family  Condition at Discharge: stable     Discharge instructions/Information to patient and family:   See after visit summary for information provided to patient and family  Provisions for Follow-Up Care:  See after visit summary for information related to follow-up care and any pertinent home health orders  Future Appointments   Date Time Provider Sourav Alvarez   10/22/2019 12:30 PM 1200 Located within [facility] 1 1200 [facility] 173   10/22/2019  2:00 PM [address] E[address]   10/25/2019 10:30 AM ALICE Mason [facility]   10/25/2019  1:30 PM Almas Romero MD [facility]       Disposition: Home    Planned Readmission: No    Discharge Statement   I spent 45 minutes discharging the patient  This time was spent on the day of discharge   I had direct contact with the patient on the day of discharge  Greater than 50% of the total time was spent examining patient, answering all patient questions, arranging and discussing plan of care with patient as well as directly providing post-discharge instructions  Additional time then spent on discharge activities  Discharge Medications:  See after visit summary for reconciled discharge medications provided to patient and family

## 2019-10-22 NOTE — PROGRESS NOTES
NEPHROLOGY PROGRESS NOTE   Jeimy Frederick 80 y o  female MRN: [id number]  Unit/Bed#:  Encounter: [id number]  Reason for Consult: HAI/CKD    ASSESSMENT AND PLAN:  Patient is a 51-year-old female with CHF, CKD, hypertension found to have recent subdural hematoma  We are consulted for HAI/CKD management  Hai on CKD stage 3, baseline creatinine 1 2 to 1 3  -initial HAI with peak creatinine 2 6 improved and creatinine seems to have plateaued around 1 5 to 1 6 over last two months  -creatinine slightly increased to 1 9 yesterday although this has now improved to 1 6 today  Increasing creatinine could be secondary to BP variability, relative hypotension  -May have to accept higher creatinine to keep her euvolemic  This may represent her new or baseline  -urinalysis this admissions bland without hematuria or proteinuria  -currently has Heller catheter, closely monitor urine output  -BMP in a m   -diuresis as below    Diastolic CHF, prior echo in August 2019 showed EF 93%, grade 2 diastolic dysfunction  Repeat echo this admission results to follow  -currently seems mildly volume overloaded, requiring 2 L oxygen via nasal cannula  Agree with Lasix 80 mg IV Q eight hourly  Diuresing well currently  Urine output roughly 100 mL/hour   -she has history of renal artery stenosis, status post renal artery stent   -will check renal Doppler ultrasound to further evaluate as this can sometimes cause flash pulmonary edema  Hypertension, continue to monitor blood pressure  Avoid low BP   -change holding parameter for amlodipine  Recent subdural hematoma    Discussed above plan with ICU team    SUBJECTIVE:  Patient seen and examined at bedside  She had an episode of hypoxic respiratory failure with worsened dyspnea and was transferred to ICU for close monitoring  Currently remains on 2 L oxygen via nasal cannula  Urine output good with ongoing diuresis    Has off and on shortness of breath but seems to be stable and better    OBJECTIVE:  Current Weight: Weight - Scale: 83 5 kg (184 lb 1 4 oz)  Vitals:    10/22/19 1600   BP:    Pulse: 73   Resp: (!) 26   Temp: 98 6 °F (37 °C)   SpO2: 91%       Intake/Output Summary (Last 24 hours) at 10/22/2019 1709  Last data filed at 10/22/2019 1501  Gross per 24 hour   Intake 929 ml   Output 1945 ml   Net -1016 ml     Wt Readings from Last 3 Encounters:   10/22/19 83 5 kg (184 lb 1 4 oz)   10/21/19 87 5 kg (192 lb 14 4 oz)   10/17/19 86 2 kg (190 lb 0 6 oz)     Temp Readings from Last 3 Encounters:   10/22/19 98 6 °F (37 °C) (Temporal)   10/21/19 98 8 °F (37 1 °C) (Tympanic)   10/19/19 97 7 °F (36 5 °C)     BP Readings from Last 3 Encounters:   10/22/19 124/56   10/21/19 149/92   10/19/19 120/53     Pulse Readings from Last 3 Encounters:   10/22/19 73   10/21/19 79   10/19/19 76        Physical Examination:  General:  Lying in bed, no acute distress   Eyes:  Mild conjunctival pallor present  ENT:  External examination of ears and nose unremarkable  Neck:  Supple  Respiratory:  Decreased breath sound at base, occasional inspiratory crackles present  CVS:  S1, S2 present  GI:  Soft, nontender, nondistended  CNS:  Active alert oriented x2  Extremities:  No significant edema in legs  Skin:  No new rash in legs    Medications:    Current Facility-Administered Medications:     acetaminophen (TYLENOL) tablet 650 mg, 650 mg, Oral, Q6H PRN, Debora Haddad PA-C, 650 mg at 10/22/19 1541    amLODIPine (NORVASC) tablet 5 mg, 5 mg, Oral, Daily, Debora Haddad PA-C, 5 mg at 10/22/19 0941    furosemide (LASIX) injection 80 mg, 80 mg, Intravenous, Q8H, Levon Franks PA-C, 80 mg at 10/22/19 1528    gabapentin (NEURONTIN) capsule 300 mg, 300 mg, Oral, BID, Debora Haddad PA-C, 300 mg at 10/22/19 0941    heparin (porcine) subcutaneous injection 5,000 Units, 5,000 Units, Subcutaneous, Q8H [address], 5,000 Units at 10/22/19 1529 **AND** [CANCELED] Platelet count, , , Once, Debora Haddad PA-C  [facility] HYDROcodone-acetaminophen (NORCO) 5-325 mg per tablet 1 tablet, 1 tablet, Oral, Q6H PRN, Maria Isabel Orozco PA-C    HYDROcodone-acetaminophen (NORCO) 5-325 mg per tablet 2 tablet, 2 tablet, Oral, Q6H PRN, Maria Isabel Orozco PA-C    insulin lispro (HumaLOG) 100 units/mL subcutaneous injection 1-5 Units, 1-5 Units, Subcutaneous, TID AC **AND** Fingerstick Glucose (POCT), , , TID AC, Levon Franks PA-C    insulin lispro (HumaLOG) 100 units/mL subcutaneous injection 1-5 Units, 1-5 Units, Subcutaneous, HS, Levon Franks PA-C    ipratropium (ATROVENT) 0 02 % inhalation solution 0 5 mg, 0 5 mg, Nebulization, Q6H, Billy Carvalho MD, 0 5 mg at 10/22/19 1352    levalbuterol ([facility]) inhalation solution 1 25 mg, 1 25 mg, Nebulization, Q6H, Billy Carvalho MD, 1 25 mg at 10/22/19 1352    levothyroxine tablet 112 mcg, 112 mcg, Oral, Daily, India Beyer MD, Stopped at 10/22/19 0503    metoprolol tartrate (LOPRESSOR) tablet 25 mg, 25 mg, Oral, Q12H [facility], Maria Isabel Orozco PA-C, 25 mg at 10/22/19 0941    nystatin (MYCOSTATIN) powder, , Topical, BID, India Beyer MD    pantoprazole (PROTONIX) EC tablet 40 mg, 40 mg, Oral, Early Morning, Levon Franks PA-C, 40 mg at 10/22/19 0745    polyethylene glycol (MIRALAX) packet 17 g, 17 g, Oral, Daily PRN, India Beyer MD    sertraline (ZOLOFT) tablet 25 mg, 25 mg, Oral, HS, Maria Isabel Orozco PA-C    Laboratory Results:  Results from last 7 days   Lab Units 10/22/19  1242 10/22/19  0458 10/21/19  2200 10/21/19  1853 10/21/19  0533 10/18/19  0513 10/17/19  0443 10/16/19  0502   WBC Thousand/uL 6 20 6 90  --  11 80*  --  6 25 6 06 5 68   HEMOGLOBIN g/dL 8 0* 7 9*  --  9 7* 8 8* 8 3* 8 7* 9 3*   HEMATOCRIT % 24 0* 23 4*  --  30 1* 26 4* 26 9* 27 3* 29 9*   PLATELETS Thousands/uL 195 204  --  261  --  193 186 209   SODIUM mmol/L  --  133*  --  134* 134* 138 139 135*   POTASSIUM mmol/L  --  4 5 4 9 5 2* 4 3 4 1 4 5 4 0   CHLORIDE mmol/L  --  94*  --  91* 94* 103 103 100   CO2 mmol/L  --  32* --  30 32* 28 30 28   BUN mg/dL  --  52*  --  48* 46* 37* 33* 33*   CREATININE mg/dL  --  1 63*  --  1 90* 1 41* 1 53* 1 50* 1 41*   CALCIUM mg/dL  --  8 7  --  9 4 9 1 8 6 8 6 8 9   MAGNESIUM mg/dL  --  2 1  --  2 4*  --   --   --  2 4   PHOSPHORUS mg/dL  --  5 9*  --  7 9* 4 0  --   --  3 2       XR chest PICC line portable   Final Result by Smith Guzmán MD (10/22 1319)   1  Interval insertion of a left-sided PICC line with tip directed laterally in the region of the SVC  2   Persistent bibasilar opacities compatible small effusions and adjacent atelectasis or infection  3   Pulmonary vascular congestion  Workstation performed: [id number]         VAS lower limb venous duplex study, complete bilateral   Final Result by Aydin Jeffries MD (10/22 1151)      CT head wo contrast   Final Result by Christiane Siegel DO (10/21 2150)      Minimal residual right parafalcine  hemorrhage in the inferior aspect of the falx  Workstation performed: [id number]         XR chest portable ICU   Final Result by [place] DO (10/21 2054)      Mild central pulmonary vascular congestion and small basilar effusions, grossly unchanged  Workstation performed: [id number]             Portions of the record may have been created with voice recognition software  Occasional wrong word or "sound a like" substitutions may have occurred due to the inherent limitations of voice recognition software  Read the chart carefully and recognize, using context, where substitutions have occurred

## 2019-10-22 NOTE — SOCIAL WORK
LOS: 1 GMLOS: none    Pt is not a bundle pt but is a 30 day readmission  Pt was admitted to [facility] on 10/12/19- 10/19/19 for subderal hematoma  Pt d/[facility] (10/19- 10/21/19)  Pt was admitted and being treated for acute on chronic respiratory failure with hypoxia  CM, Nephrology, PT, and OT Consulted  PT and OT recommending acute rehab  SW met w/ pt to complete assessment  Pt was alert and able to complete assessment questions  SW confirmed that pt's emergency contact is her daughter, Aneta Almendarez ([phone], [phone])  Pt's has both POA and living will  Both are copied on chart  Pt's daughter [facility] is POA  Pt is  and receives SSI  Pt lives in a one floor apartment on the same property as her daughter  Pt's apartment has 2 ISRA w/ rails  Pt needed assistance w/ meal prep, dressing occassionaly, medical management and finances  Pt's daughter [facility] transports pt as needed  Pt owns a walker and a Rolator and a wheelchair  Pt is not open w/ any community or home resources  Pt's PCP is Basil Essex, DO  Pt had no hx of mental health or substance abuse  Pt is not a   Pt's preferred [facility] is [facility] [address] in [place]  When pt is d/c her daughter [facility] can transport  SW discussed PT and OT recommendation of acute rehab  Pt Is preferring to return to [facility] to send referral      Pt had no other questions or concerns  SW will follow for plan of care

## 2019-10-22 NOTE — PLAN OF CARE
Problem: OCCUPATIONAL THERAPY ADULT  Goal: Performs self-care activities at highest level of function for planned discharge setting  See evaluation for individualized goals  Description  Treatment Interventions: ADL retraining, Functional transfer training, UE strengthening/ROM, Endurance training, Cognitive reorientation, Continued evaluation          See flowsheet documentation for full assessment, interventions and recommendations  Note:   Limitation: Decreased ADL status, Decreased UE ROM, Decreased UE strength, Decreased Safe judgement during ADL, Decreased cognition, Decreased endurance, Decreased high-level ADLs  Prognosis: Good  Assessment: Pt is a 80 y o  female seen for OT evaluation s/p admit to [facility] on 10/21/2019 w/ Acute on chronic respiratory failure with hypoxia ([facility] [address] )  Comorbidities affecting Pt's functional performance at time of assessment include: chronic kidney injury, CHF, SDH secondary to a fall    Personal factors affecting Pt at time of IE include:difficulty performing ADLS, difficulty performing IADLS , health management  and environment  Upon evaluation: Pt requires maxA for all standing and LB ADLs, Deondre x 1 with additional CGA x 1 for lateral side steps at EOB  Pt's vitals WNL throughout with O2 ranging >93% throughout on supplemental O2 via NC  The following deficits impact occupational performance: weakness, decreased ROM, decreased strength, decreased balance, decreased tolerance, impaired problem solving and decreased safety awareness  Pt to benefit from continued skilled OT services while in the hospital to address deficits as defined above and maximize level of functional independence w ADL's and functional mobility  Occupational performance areas to address include: grooming, bathing/shower, toilet hygiene, dressing, medication management, health maintenance, functional mobility, clothing management and household maintenance   From OT standpoint, recommendation at time of d/c would be return to acute IP rehab         OT Discharge Recommendation: Short Term Rehab(acute rehab)

## 2019-10-22 NOTE — OCCUPATIONAL THERAPY NOTE
Occupational Therapy Evaluation  10:15-10:45am     Patient Name: Winnie Florence  [id number] Date: 10/22/2019  Problem List  Principal Problem:    Acute on chronic respiratory failure with hypoxia [facility])  Active Problems:    Essential hypertension    Acute on chronic diastolic heart failure (HCC)    Acute-on-chronic kidney injury (HCC)    Anemia    SDH (subdural hematoma) ([facility])    AZUL (obstructive sleep apnea)    Controlled type 2 diabetes mellitus with diabetic neuropathy, without long-term current use of insulin ([facility])    Elevated troponin    Morbid obesity ([facility] )    Past Medical History  Past Medical History:   Diagnosis Date    Arthritis     Breast cancer ([facility] ) 2015    Cardiac disease     aortic valve transplant    CHF (congestive heart failure) ([facility] )     Compression fracture of body of thoracic vertebra ([facility] )     CVA (cerebral vascular accident) ([facility] [facility] )     Diabetes mellitus ([facility] )     Disease of thyroid gland     Fibromyalgia, primary     H/O cervical fracture 01/09/2019    Hyperlipidemia     Hypertension     Neuropathy     AZUL (obstructive sleep apnea) 10/19/2019    Pressure injury of skin     Renal disorder     kidney stent    Stroke [facility])      Past Surgical History  Past Surgical History:   Procedure Laterality Date    AORTIC VALVE SURGERY      BREAST LUMPECTOMY Right     BREAST LUMPECTOMY Left     BREAST SURGERY      lumpectomy for breast cancer    CATARACT EXTRACTION      CHOLECYSTECTOMY      HYSTERECTOMY  1978    KIDNEY SURGERY      stent placed for kidney    OTHER SURGICAL HISTORY      abdominal aneurysm surgery    SC ARTHRODESIS POSTERIOR ATLAS-AXIS C1-C2 N/A 5/1/2019    Procedure: C1-C2 lateral mass fixation fusion with possible sublaminar cables;  Surgeon: Tj Pradhan MD;  [facility];  Service: Neurosurgery    REPLACEMENT TOTAL KNEE      left            10/22/19 1015   Note Type   Note type Eval only   Restrictions/Precautions   Weight Bearing Precautions Per Order No   Other Precautions O2;Telemetry;Multiple lines; Fall Risk;Cognitive   Pain Assessment   Pain Assessment No/denies pain   Home Living   Type of [facility] One level;Ramped entrance   Bathroom Shower/Tub Walk-in shower   Bathroom Toilet Standard   Bathroom Equipment Grab bars in shower   [address]   Prior Function   Comments Pt admitted from acute rehab  Prior to admission to acute rehab, Pt was largely independent with ADLs and mobility  Psychosocial   Psychosocial (WDL) WDL   Subjective   Subjective "my daughter was trying to catch me when I fell"   ADL   Eating Assistance 5  Supervision/Setup   Grooming Assistance 5  Supervision/Setup   UB Bathing Assistance 4  Minimal Assistance   LB Bathing Assistance 2  Maximal Assistance   UB Dressing Assistance 4  Minimal [address] 2  Maximal [address]  2  Maximal Assistance   Bed Mobility   Supine to Sit 3  Moderate assistance   Additional items Assist x 2;HOB elevated; Increased time required   Sit to Supine 3  Moderate assistance   Additional items Assist x 2;LE management; Increased time required;Verbal cues   Transfers   Sit to Stand 4  Minimal assistance   Additional items Assist x 2; Increased time required;Verbal cues   Stand to Sit 4  Minimal assistance   Additional items Assist x 2; Increased time required;Verbal cues   Functional Mobility   Functional Mobility 4  Minimal assistance   Additional Comments x 1, CGA x another for lateral side steps at EOB   Additional items Rolling walker   Balance   Static Sitting Fair +   Dynamic Sitting Fair   Static Standing Fair -   Dynamic Standing Poor +   Ambulatory Poor +   Activity Tolerance   Activity Tolerance Patient limited by fatigue   Nurse Made Aware yes, ZENAIDA Copeland    RUE Assessment   RUE Assessment WFL   LUE Assessment   LUE Assessment WFL   Vision-Basic Assessment   Current Vision Wears glasses only for reading   Cognition   Arousal/Participation Alert; Cooperative   Attention Attends with cues to redirect   Orientation Level Oriented to person;Disoriented to situation;Oriented to time  (Pt did not know she was in ICU, unable to state which hospit)   Memory Decreased recall of recent events   Following Commands Follows one step commands without difficulty   Comments Pt tangential re: medical history, however appears to be appropriate in timeline of events  Pt following all commands and demonstrates understanding of current fuctional deficits  Assessment   Limitation Decreased ADL status; Decreased UE ROM; Decreased UE strength;Decreased Safe judgement during ADL;Decreased cognition;Decreased endurance;Decreased high-level ADLs   Prognosis Good   Assessment   Pt is a 80 y o  female seen for OT evaluation s/p admit to [facility] on 10/21/2019 w/ Acute on chronic respiratory failure with hypoxia ([facility] [address] )  Comorbidities affecting Pt's functional performance at time of assessment include: chronic kidney injury, CHF, SDH secondary to a fall    Personal factors affecting Pt at time of IE include:difficulty performing ADLS, difficulty performing IADLS , health management  and environment  Upon evaluation: Pt requires maxA for all standing and LB ADLs, Deondre x 1 with additional CGA x 1 for lateral side steps at EOB  Pt's vitals WNL throughout with O2 ranging >93% throughout on supplemental O2 via NC  The following deficits impact occupational performance: weakness, decreased ROM, decreased strength, decreased balance, decreased tolerance, impaired problem solving and decreased safety awareness  Pt to benefit from continued skilled OT services while in the hospital to address deficits as defined above and maximize level of functional independence w ADL's and functional mobility   Occupational performance areas to address include: grooming, bathing/shower, toilet hygiene, dressing, medication management, health maintenance, functional mobility, clothing management and household maintenance  From OT standpoint, recommendation at time of d/c would be return to acute IP rehab  Goals   STG Time Frame   (2 weeks)   Short Term Goal #1 Pt will complete bed-->chair transfer with CGA x 1  Short Term Goal #2 Pt will complete toilet transfer with CGA x 1  Short Term Goal  Pt will complete LB dressing with Deondre x 1  Plan   Treatment Interventions ADL retraining;Functional transfer training;UE strengthening/ROM; Endurance training;Cognitive reorientation;Continued evaluation   Goal Expiration Date 11/05/19   OT Treatment Day 0   OT Frequency 3-5x/wk   Recommendation   OT Discharge Recommendation Short Term Rehab  (acute rehab)   Barthel Index   Feeding 10   Bathing 0   Grooming Score 0   Dressing Score 5   Bladder Score 0   Bowels Score 5   Toilet Use Score 5   Transfers (Bed/Chair) Score 5   Mobility (Level Surface) Score 0   Stairs Score 0   Barthel Index Score 30

## 2019-10-22 NOTE — ASSESSMENT & PLAN NOTE
· Baseline hemoglobin appears to be 9-10  · Last hemoglobin this morning 8 8  · No overt s/s of bleeding -- continue to monitor  · Trend on serial hemoglobins  · Plan for transfusion if hemoglobin falls less than 7

## 2019-10-22 NOTE — NURSING NOTE
Pt is alert and generally oriented  C/o pain to various locations including neck, back, shoulders, knees and right groin  Anxious and somewhat restless  NPO at present with respiratory distress  Moves extremities gingerly with limited ROM due to arthritis  Moves in bed only with assistance  Restraints removed for low risk of interfering with medical treatment  Pupils are equal and brisk  Otherwise difficult to assess neurologically because she is currently dependent on BIPAP and has difficulty communicating  Heart tones are clear with palpable pulses and +1 edema  Color is pale but warm and dry  Arms are ecchymotic due to IV attempts  Lungs are very decreased with high pitched I/E wheezes and fine scattered crackles  Very labored with accessory muscle use  Abdomen is very large and soft with hypoactive bowel sounds  No BM  Urinary catheter inserted x 1 attempt  Urine return is brisk for clear yellow  Will monitor  IV site to left anterior chest wall intact and infusing  CVP to right groin is intact with bloody drainage to dressing  Site is very tender  No palpable mass or hematoma  Olga care provided  Mildly excoriated to abdominal folds  External labia are noted to be enlarged and discolored  Daughter states this is new  Sacrum is intact

## 2019-10-22 NOTE — NURSING NOTE
Pt is alert with mild confusion  Easily reoriented  C/o nonspecific pain to multiple areas  Mostly bothered by burning sensation to right groin  Site is very tender to touch and difficult to examine due to large panus  No mass or hematoma noted  Right pupil is noted to be larger than left and sluggishly reactive  AP notified and seen at bedside  No other focal deficits though pt is restless  AP declined to medicate for pain at this time due to deteriorated condition  Lactic acid will be drawn at 0100 per her request   Remains very short of breath with restlessness  Pt is moaning often but not sleeping  Urine is clear yellow

## 2019-10-22 NOTE — ASSESSMENT & PLAN NOTE
· Noted on chart review to have chronic renal insufficiency with creatinine baseline appearing to be about 1 5-1 6  · Creatinine this morning 1 41 -- stat labs pending  · Heller in place for accurate I&O's   · Nephrology following, who placed patient on Lasix today -- will need to be seen by Nephrology given acute change in clinical status  · Avoid nephrotoxic agents  · Trend renal indices on serial BMPs  · Creat trending up since ICU admit to 1 9

## 2019-10-22 NOTE — ASSESSMENT & PLAN NOTE
· On home Synthroid p o , currently being held respiratory status -- plan to restart as soon as able  · TSH 3 36

## 2019-10-22 NOTE — ASSESSMENT & PLAN NOTE
· Initial presents to the emergency department on 10/8/19 after a fall from standing without loss of consciousness  · Most recent [facility] on 10/12/19 noted stable right parafalcine extra-axial hemorrhage  · Stat CT head -- Minimal residual right parafalcine hemorrhage in the inferior aspect of the falx    · Serial neuro exams  · Continuing to hold all blood thinners, ASA, pharmacologic DVT prophylaxis  · PT/INR WNL

## 2019-10-22 NOTE — TELEPHONE ENCOUNTER
10/22/2019- CALLED [facility] PHONE#[phone], SPOKE TO VERO, WHO ADVISED PT ADMITTED AS INPT TO ICU

## 2019-10-22 NOTE — ASSESSMENT & PLAN NOTE
· Currently appears to be at her baseline, awake alert oriented x3 with no overt deficits noted  · Continue scheduled neuro checks  · ASA and Plavix currently on hold given recent subdural hematoma

## 2019-10-22 NOTE — PLAN OF CARE
Problem: DISCHARGE PLANNING - CARE MANAGEMENT  Goal: Discharge to post-acute care or home with appropriate resources  Description  INTERVENTIONS:  - Conduct assessment to determine patient/family and health care team treatment goals, and need for post-acute services based on payer coverage, community resources, and patient preferences, and barriers to discharge  - Address psychosocial, clinical, and financial barriers to discharge as identified in assessment in conjunction with the patient/family and health care team  - Arrange appropriate level of post-acute services according to patients   needs and preference and payer coverage in collaboration with the physician and health care team  - Communicate with and update the patient/family, physician, and health care team regarding progress on the discharge plan  - Arrange appropriate transportation to post-acute venues  Outcome: Progressing  - Social work will follow for plan of care for acute rehabilitation needs

## 2019-10-22 NOTE — UTILIZATION REVIEW
Faxed [facility] the Authorization Form and Clinicals for authorization  Notification of Inpatient Admission/Inpatient Authorization Request  This is a Notification of Inpatient Admission/Request for Inpatient Authorization for our facility [facility]  Be advised that this patient was admitted to our facility under Inpatient Status  Please contact the Utilization Review Department where the patient is receiving care services for additional admission information  Facility: [facility]  Place of Service Code: 21   Place of Service Name: Inpatient Hospital  Presentation Date & Time: 10/21/2019  5:07 PM  Inpatient Admission Date & Time: 10/21/19 1707  Discharge Date & Time: No discharge date for patient encounter  Discharge Disposition (if discharged): [facility]  Attending Physician and NPI#: Lazarus Clancy  Admission Orders (From admission, onward)     Ordered        10/21/19 1745  Inpatient Admission  Once                   Network Utilization Review Department  Phone: [phone]; Fax [phone]  [email or web address]  ATTENTION: Please call with any questions or concerns to [phone]  and carefully listen to the prompts so that you are directed to the right person  Send all requests for admission clinical reviews, approved or denied determinations and any other requests to fax [phone]   All voicemails are confidential

## 2019-10-22 NOTE — PROGRESS NOTES
Vancomycin IV Pharmacy-to-Dose Consultation   Jj Jones is a 80 y o  female who was receiving Vancomycin IV with management by the Pharmacy Consult service for treatment of Pneumonia    Vancomycin days therapy:  2  Vancomycin 1000mg IV q24h with a trough due for 10/24/19 at 20:00  CrCl 25 ml/min    Draw level peripherally  Give dose immediately after trough (do NOT hold dose)  Call Pharmacy with any questions/concerns (Pharm Consult)     Vy Glover, [address]    [phone]

## 2019-10-22 NOTE — PROGRESS NOTES
Progress Note - Gera Franco 1934, 80 y o  female MRN: [id number]    Unit/Bed#:  Encounter: [id number]    Primary Care Provider: Charle Lesch, DO   Date and time admitted to hospital: 10/21/2019  5:07 PM        * Acute on chronic respiratory failure with hypoxia ([address] )  Assessment & Plan  · Transferred to the ICU this evening from rehab after notably increased work of breathing, shortness of breath, hypoxia, conversational dyspnea  · Notably decreased lung sounds throughout bilaterally  · ABG -- pH 7 1, CO2 81, pO2 156, HCO3 27 6, BE -3 1-- improved ABG recheck at 2100 to pH 7 34, CO2 55  · Placed on BiPAP with mild improvement in work of breathing  · On chart review, this appears to be likely related to her acute on chronic diastolic heart failure, which appears to be worse than baseline  · BNP earlier today over 26,000  · Stat portable CXR shows some pulmonary vascular congestion   · Initially given 40 Lasix x1 today, on admission to ICU given additional 80 Lasix x1 -- additional Lasix 40mg IV x1 given overnight for less than desired UO   · Monitor I&O's -- Fluid goal for 24 hours at -1 L  · NPO while on BiPAP and in respiratory distress  · Started on nitro drip -- titratable with goal of systolic blood pressure less than 140 -- turned off around 2100 last pm for adequate BP's  · Continue cardiac monitoring  · Pulmonary toileting as able  · Stat blood cultures x2, strep/legionella pending  · Flu negative   · Solu-Medrol 125 mg IV x1 given  · Scheduled Xopenex, Atrovent  · Abx D1, Zithromax D1, ceftriaxone D1, vancomycin D1  · Pharmacy consult placed for Vanc dosing  · Procalcitonin 0 29    Acute on chronic diastolic heart failure (HCC)  Assessment & Plan  Wt Readings from Last 3 Encounters:   10/21/19 92 4 kg (203 lb 11 3 oz)   10/21/19 87 5 kg (192 lb 14 4 oz)   10/17/19 86 2 kg (190 lb 0 6 oz)         · Grade 2 diastolic heart failure noted on previous echo in August 2019  · Repeat echo pending  · Current acute on chronic respiratory failure necessitating transfer to ICU and placed on BiPAP likely attributable to worsening of this chronic diastolic heart failure  · Initially given 40 Lasix x1 today by Nephrology, additional 80 Lasix IV x1 provided on admission ICU -- given additional Lasix 40mg overnight for less than desired UO   · Plan on monitoring I&O's closely with 24 hour fluid goal of net -1 L    Acute-on-chronic kidney injury ([address] )  Assessment & Plan  · Noted on chart review to have chronic renal insufficiency with creatinine baseline appearing to be about 1 5-1 6  · Creatinine this morning 1 41 -- stat labs pending  · Heller in place for accurate I&O's   · Nephrology following, who placed patient on Lasix today -- will need to be seen by Nephrology given acute change in clinical status  · Avoid nephrotoxic agents  · Trend renal indices on serial BMPs  · Creat trending up since ICU admit to 1 9    Essential hypertension  Assessment & Plan  · On home Norvasc p o , currently being held for respiratory status  · Systolic blood pressure 707 on arrival to ICU, started on nitroglycerin drip, titratable, with systolic blood pressure goal less than 140  · Home metoprolol converted to IV -- anticipate need to convert back to PO once able     Elevated troponin  Assessment & Plan  · Initial troponin 3 24, trending downward   · All ASA, plavix, heparin being held due to recent SDH    Controlled type 2 diabetes mellitus with diabetic neuropathy, without long-term current use of insulin [facility])  Assessment & Plan  Lab Results   Component Value Date    HGBA1C 5 0 08/16/2019       Recent Labs     10/21/19  0650 10/21/19  1735 10/21/19  2153 10/22/19  0057   POCGLU 103 373* 96 79       Blood Sugar Average: Last 72 hrs:   (P) 87 5     · HbA1c on 08/16/2019 -- 5  · SSI with fingerstick blood glucose q 6 hours for NPO status to maintain blood sugar 140-180    Hyperlipidemia  Assessment & Plan  · Not on home statin    AZUL (obstructive sleep apnea)  Assessment & Plan  · Previous chart review notes that she has been noncompliant with CPAP at night for past 10-15 years  · Continues on BiPAP for acute on chronic respiratory failure with hypoxia    SDH (subdural hematoma) ([facility] [address] )  Assessment & Plan  · Initial presents to the emergency department on 10/8/19 after a fall from standing without loss of consciousness  · Most recent [address] on 10/12/19 noted stable right parafalcine extra-axial hemorrhage  · Stat CT head -- Minimal residual right parafalcine hemorrhage in the inferior aspect of the falx  · Serial neuro exams  · Continuing to hold all blood thinners, ASA, pharmacologic DVT prophylaxis  · PT/INR WNL    Anemia  Assessment & Plan  · Baseline hemoglobin appears to be 9-10  · Last hemoglobin this morning 8 8  · No overt s/s of bleeding -- continue to monitor  · Trend on serial hemoglobins  · Plan for transfusion if hemoglobin falls less than 7    S/P AVR  Assessment & Plan  · Normal function of bioprosthetic valve noted on previous echo in August 2019  · Repeat echo pending    H/O: CVA (cerebrovascular accident)  Assessment & Plan  · Currently appears to be at her baseline, awake alert oriented x3 with no overt deficits noted  · Continue scheduled neuro checks  · ASA and Plavix currently on hold given recent subdural hematoma    Hypothyroidism  Assessment & Plan  · On home Synthroid p o , currently being held respiratory status -- plan to restart as soon as able  · TSH 3 36    ----------------------------------------------------------------------------------------  HPI/24hr events: Last night, she was admitted to Critical Care for acute on chronic respiratory failure likely secondary to fluid overload  She was placed on BiPAP with improvement of respiratory status and subsequent ABG  A total of 120mg Lasix was given yesterday and a Heller was inserted   However, after less than optimal UO response, an additional Lasix 40mg IV x1 was given overnight  CXR appears to have pulmonary vascular congestion  A nitro gtt was initially started for SBP >190, however this was weaned off around 2100 last night for adequate blood pressures  Overnight, her only complaint is that the BiPAP makes her uncomfortable, however prolonged breaks from the mask lead to increased work of breathing relatively quickly  She continues to have a tenuous respiratory status, though she states she feels improved  A R Fem TLC was placed on her transfer for lack of access  Disposition: Continue Critical Care   Code Status: Level 1 - Full Code  ---------------------------------------------------------------------------------------  SUBJECTIVE  "I feel fine"    Review of Systems   Constitutional: Negative  HENT: Negative  Eyes: Negative  Respiratory: Positive for shortness of breath and wheezing  Negative for apnea, cough, choking and chest tightness  Gastrointestinal: Negative  Endocrine: Negative  Genitourinary: Negative  Musculoskeletal: Negative  Allergic/Immunologic: Negative  Neurological: Negative  Hematological: Negative  Psychiatric/Behavioral: Negative  All other systems reviewed and are negative  Review of systems was reviewed and negative unless stated above in HPI/24-hour events   ---------------------------------------------------------------------------------------  OBJECTIVE    Physical Exam   Constitutional: She is oriented to person, place, and time  She appears well-developed and well-nourished  She is cooperative  She is easily aroused  She appears ill  She appears distressed  HENT:   Head: Normocephalic and atraumatic  Mouth/Throat: Oropharynx is clear and moist and mucous membranes are normal  No oropharyngeal exudate  Eyes: EOM and lids are normal  Right eye exhibits no discharge  Left eye exhibits no discharge  No scleral icterus     R pupil 5mm, round, reactive to light   L pupil 4mm, round, reactive to light    Neck: Normal range of motion  Neck supple  No tracheal deviation present  Cardiovascular: Normal rate, regular rhythm, normal heart sounds and intact distal pulses  Exam reveals no gallop and no friction rub  No murmur heard  Pulses:       Radial pulses are 2+ on the right side, and 2+ on the left side  Dorsalis pedis pulses are 2+ on the right side, and 2+ on the left side  Pulmonary/Chest: Accessory muscle usage present  Tachypnea noted  She has decreased breath sounds (very slightly improved from previous)  She has wheezes (insporatory and expiratory anteriorly)  BiPAP continued -- , VT's 350-460   Abdominal: Soft  Bowel sounds are normal  There is no tenderness  Obese appearing   Genitourinary:   Genitourinary Comments: Heller present   Musculoskeletal: Normal range of motion  She exhibits no edema, tenderness or deformity  Lymphadenopathy:     She has no cervical adenopathy  Neurological: She is alert, oriented to person, place, and time and easily aroused  No cranial nerve deficit  GCS eye subscore is 4  GCS verbal subscore is 5  GCS motor subscore is 6  Skin: Skin is warm and dry  Capillary refill takes less than 2 seconds  No rash noted  She is not diaphoretic  No pallor  Psychiatric: She has a normal mood and affect  Her behavior is normal  Judgment and thought content normal    Nursing note and vitals reviewed        Vitals   Vitals:    10/22/19 0200 10/22/19 0246 10/22/19 0300 10/22/19 0400   BP: (!) 109/48  119/61 133/62   BP Location:    Left arm   Pulse: 65  69 71   Resp: 20  (!) 32 20   Temp:    98 2 °F (36 8 °C)   TempSrc:    Temporal   SpO2: 100% 100% 100% 100%   Weight:    83 5 kg (184 lb 1 4 oz)   Height:         Temp (24hrs), Av 6 °F (36 4 °C), Min:96 5 °F (35 8 °C), Max:98 8 °F (37 1 °C)  Current: Temperature: 98 2 °F (36 8 °C)      Invasive/non-invasive ventilation settings   Respiratory    Lab Data (Last 4 hours)    None         O2/Vent Data (Last 4 hours)      10/22 0129 10/22 0246        Non-Invasive Ventilation Mode BiPAP BiPAP                  Height and Weights   Height: 4' 11" (149 9 cm)  IBW: 43 2 kg  Body mass index is 37 18 kg/m²  Weight (last 2 days)     Date/Time   Weight    10/22/19 0400   83 5 (184 08)    10/21/19 1900   92 4 (203 71)                Intake and Output  I/O       10/20 0701 - 10/21 0700 10/21 0701 - 10/22 0700    I V  (mL/kg)  109 (1 2)    IV Piggyback  500    Total Intake(mL/kg)  609 (6 6)    Urine (mL/kg/hr)  775    Total Output  775    Net  -166                Nutrition       Diet Orders   (From admission, onward)             Start     Ordered    10/21/19 1736  Diet NPO  Diet effective now     Question Answer Comment   Diet Type NPO    RD to adjust diet per protocol?  Yes        10/21/19 1745                Laboratory and Diagnostics:  Results from last 7 days   Lab Units 10/21/19  1853 10/21/19  0533 10/18/19  0513 10/17/19  0443 10/16/19  0502 10/15/19  1131   WBC Thousand/uL 11 80*  --  6 25 6 06 5 68  --    HEMOGLOBIN g/dL 9 7* 8 8* 8 3* 8 7* 9 3*  --    HEMATOCRIT % 30 1* 26 4* 26 9* 27 3* 29 9*  --    PLATELETS Thousands/uL 261  --  193 186 209 190   NEUTROS PCT %  --   --  62 64 61  --    MONOS PCT %  --   --  8 7 8  --      Results from last 7 days   Lab Units 10/21/19  2200 10/21/19  1853 10/21/19  0533 10/18/19  0513 10/17/19  0443 10/16/19  0502 10/15/19  1131   SODIUM mmol/L  --  134* 134* 138 139 135* 135*   POTASSIUM mmol/L 4 9 5 2* 4 3 4 1 4 5 4 0 3 7   CHLORIDE mmol/L  --  91* 94* 103 103 100 100   CO2 mmol/L  --  30 32* 28 30 28 27   ANION GAP mmol/L  --  13 8 7 6 7 8   BUN mg/dL  --  48* 46* 37* 33* 33* 35*   CREATININE mg/dL  --  1 90* 1 41* 1 53* 1 50* 1 41* 1 52*   CALCIUM mg/dL  --  9 4 9 1 8 6 8 6 8 9 8 9   GLUCOSE RANDOM mg/dL  --  224* 104* 96 89 84 150*   ALT U/L  --  26  --   --   --   --   --    AST U/L  --  41*  --   --   --   --   --    ALK PHOS U/L  --  88  --   --   --   --   --    ALBUMIN g/dL  -- 4 3  --   --   --   --   --    TOTAL BILIRUBIN mg/dL  --  0 70  --   --   --   --   --      Results from last 7 days   Lab Units 10/21/19  1853 10/21/19  0533 10/16/19  0502 10/15/19  1131   MAGNESIUM mg/dL 2 4*  --  2 4 2 5   PHOSPHORUS mg/dL 7 9* 4 0 3 2 2 9      Results from last 7 days   Lab Units 10/21/19  1853   INR  0 94   PTT seconds 21*      Results from last 7 days   Lab Units 10/22/19  0059 10/21/19  2200 10/21/19  1853   TROPONIN I ng/mL 2 85* 3 03* 3 24*     Results from last 7 days   Lab Units 10/22/19  0059 10/21/19  1852   LACTIC ACID mmol/L 1 0 1 8     ABG:  Results from last 7 days   Lab Units 10/21/19  2103   PH ART  7 340*   PCO2 ART mm Hg 55 0*   PO2 ART mm Hg 127 0*   HCO3 ART mmol/L 29 7*   BASE EXC ART mmol/L 2 8*   ABG SOURCE  Radial, Right     VBG:  Results from last 7 days   Lab Units 10/21/19  2103  10/16/19  2209   PH KHALIF   --   --  7 331   PCO2 KHALIF mm Hg  --   --  59 5*   PO2 KHALIF mm Hg  --   --  22 9*   HCO3 KHALIF mmol/L  --   --  30 7*   BASE EXC KHALIF mmol/L  --   --  3 8   ABG SOURCE  Radial, Right   < >  --     < > = values in this interval not displayed  Results from last 7 days   Lab Units 10/21/19  1853   PROCALCITONIN ng/ml 0 29*       Micro  BC x2 pending   Strep/legionella pending  Flu negative     EKG: NSR on tele   Imaging: I have personally reviewed pertinent reports  CT head wo contrast   Final Result      Minimal residual right parafalcine  hemorrhage in the inferior aspect of the falx  Workstation performed: [id number]         XR chest portable ICU   Final Result      Mild central pulmonary vascular congestion and small basilar effusions, grossly unchanged              Workstation performed: [id number]         VAS lower limb venous duplex study, complete bilateral    (Results Pending)         Active Medications  Scheduled Meds:    Current Facility-Administered Medications:  acetaminophen 650 mg Oral Q6H PRN Ellen Jacobs MD    azithromycin 500 mg Intravenous Q24H Kelton Leiva PA-C Last Rate: 500 mg (10/21/19 2157)   cefepime 2,000 mg Intravenous Q24H Kelton Leiva PA-C Last Rate: Stopped (10/21/19 1947)   famotidine 20 mg Intravenous Q48H Levon Franks PA-C    insulin lispro 1-5 Units Subcutaneous Q6H [address] NATHEN Ospina    ipratropium 0 5 mg Nebulization Q6H Ernestina Huerta MD    levalbuterol        levalbuterol        levalbuterol 1 25 mg Nebulization Q6H Ernestina Huerta MD    levothyroxine 112 mcg Oral Daily Abbey Vang MD    metoprolol 2 5 mg Intravenous Q12H NATHEN Ospina    nitroGLYcerin 5-200 mcg/min Intravenous Titrated Kelton Leiva PA-C Last Rate: 3 33 mcg/min (10/21/19 2106)   nystatin  Topical BID Abbey Vang MD    polyethylene glycol 17 g Oral Daily PRN Abbey Vang MD    vancomycin 15 mg/kg (Adjusted) Intravenous Q24H Kelton Leiva PA-C Last Rate: Stopped (10/21/19 2145)     Continuous Infusions:    nitroGLYcerin 5-200 mcg/min Last Rate: 3 33 mcg/min (10/21/19 2106)     PRN Meds:     acetaminophen 650 mg Q6H PRN   polyethylene glycol 17 g Daily PRN       ---------------------------------------------------------------------------------------  Advance Directive and Living Will: Yes    Power of : Yes  POLST:    ---------------------------------------------------------------------------------------      NATHEN Lazo        Portions of the record may have been created with voice recognition software  Occasional wrong word or "sound a like" substitutions may have occurred due to the inherent limitations of voice recognition software    Read the chart carefully and recognize, using context, where substitutions have occurred

## 2019-10-22 NOTE — PHYSICAL THERAPY NOTE
Physical Therapy Evaluation   Time In/Out:6205-5554    Patient's Name: Jeimy Frederick    Admitting Diagnosis  CHF (congestive heart failure) ([facility] 75 ) [I50 9]    Problem List  Patient Active Problem List   Diagnosis    Hypothyroidism    H/O: CVA (cerebrovascular accident)    Essential hypertension    [facility] ([facility] )    Depression    History of aortic valve replacement    Acute-on-chronic kidney injury ([place] [address] )    Anemia    SDH (subdural hematoma) ([facility])    AZUL (obstructive sleep apnea)    Hyperlipidemia    Acute on chronic respiratory failure with hypoxia ([facility])    Controlled type 2 diabetes mellitus with diabetic neuropathy, without long-term current use of insulin ([facility])    RA (rheumatoid arthritis) ([place] [address] )    SLE (systemic lupus erythematosus) ([facility])    Elevated troponin    H/O spinal fusion    History of renal stent    History of malignant neoplasm of breast    Morbid obesity ([place] [address] )    Osteoporosis    Peripheral polyneuropathy    Vitamin D deficiency       Past Medical History  Past Medical History:   Diagnosis Date    Arthritis     Breast cancer ([place] [address] ) 2015    Cardiac disease     aortic valve transplant    CHF (congestive heart failure) ([facility])     Compression fracture of body of thoracic vertebra ([facility])     CVA (cerebral vascular accident) ([place] [address] )     Diabetes mellitus ([place] [address] )     Disease of thyroid gland     Fibromyalgia, primary     H/O cervical fracture 01/09/2019    Hyperlipidemia     Hypertension     Neuropathy     AZUL (obstructive sleep apnea) 10/19/2019    Pressure injury of skin     Renal disorder     kidney stent    Stroke [facility])        Past Surgical History  Past Surgical History:   Procedure Laterality Date    AORTIC VALVE SURGERY      BREAST LUMPECTOMY Right     BREAST LUMPECTOMY Left     BREAST SURGERY      lumpectomy for breast cancer    CATARACT EXTRACTION      CHOLECYSTECTOMY      HYSTERECTOMY  1978    KIDNEY SURGERY      stent placed for kidney    OTHER SURGICAL HISTORY      abdominal aneurysm surgery    ME ARTHRODESIS POSTERIOR ATLAS-AXIS C1-C2 N/A 5/1/2019    Procedure: C1-C2 lateral mass fixation fusion with possible sublaminar cables;  Surgeon: Niurka Morales MD;  [facility]: [facility];  Service: Neurosurgery    REPLACEMENT TOTAL KNEE      left          ________________________________________________________     10/22/19 1045   Note Type   Note type Eval only   Pain Assessment   Pain Assessment 0-10   Pain Score   (c/o some neck and back pain upon return to bed  did not rate)   Home Living   Type of [address] [place] [address] One level;Ramped entrance   Bathroom Shower/Tub Walk-in shower   [address] bars around toilet;Grab bars in [facility]  (RW)   Prior Function   Level of [place] Independent with ADLs and functional mobility  (with RW)   Lives With Daughter  (Son in law, grandchildren; apt at home never alone per pt )   Receives [facility] in the last 6 months 1 to 4   Comments does not drive   Restrictions/Precautions   Weight Bearing Precautions Per Order No   Other Precautions O2;Telemetry;Multiple lines;Pain;Bed Alarm   General   Family/Caregiver Present No   Cognition   Arousal/Participation Cooperative   Attention Within functional limits   Orientation Level Oriented to person   Following Commands Follows one step commands with increased time or repetition   Comments Pt on 2 LPM NC ; she reports that she was not on O2 Prior to hospital admission  RUE Assessment   RUE Assessment WFL   LUE Assessment   LUE Assessment WFL   RLE Assessment   RLE Assessment   (decreased knee flexion)   Strength RLE   RLE Overall Strength   (hip flexion 3-/5, ext 3/5 mod   test,hip abd 2-/5, df 5/5)   LLE Assessment   LLE Assessment WFL   Strength LLE   LLE Overall Strength   (hip flexion 3-/5, ankle df 5/5, ahip abd 2-/5)   Bed Mobility Supine to Sit 3  Moderate assistance   Additional items Assist x 2; Increased time required;Verbal cues;LE management   Sit to Supine 3  Moderate assistance   Additional items Assist x 2;LE management;Verbal cues   Transfers   Sit to Stand 4  Minimal assistance   Additional items Assist x 2  (to SW)   Stand to Sit 4  Minimal assistance   Additional items Assist x 2; Increased time required;Verbal cues  (to bed)   Stand pivot Unable to assess   Ambulation/Elevation   Gait pattern Excessively slow  (side stepping to HOB only)   Gait Assistance 4  Minimal assist   Additional items Assist x 1  (CGA of 1)   Assistive Device Standard walker   Distance 3 feet    Balance   Static Sitting Fair +   Dynamic Sitting Poor   Static Standing Fair -   Dynamic Standing Fair -   Ambulatory Fair -   Endurance Deficit   Endurance Deficit Yes   Endurance Deficit Description   (SOB noted following activity, SpO2 Stable 2LPM NC 99%)   Activity Tolerance   Activity Tolerance Patient limited by fatigue;Patient limited by pain   Nurse Made Aware   (RN clears for evaluation)   Assessment   Prognosis Good   Problem List Decreased strength;Decreased range of motion;Decreased endurance; Impaired balance;Decreased mobility; Decreased coordination;Decreased cognition;Decreased safety awareness; Obesity;Pain;Decreased skin integrity   Assessment Pt admitted to [facility] on [facility]19  with progressive worsening of SOB resulting in acute respirataory failure with hypoxia   past medical history of chronic kidney injury, essential hypertension, hypothyroidism, history of CVA , chronic diastolic heart failure, status post aortic valve replacement, anemia,h/o malignant neoplasm of breast, SLE, RA,osteoporosis, peripheral poly neuropathy, depression, AZUL, hyperlipidemia, Cervical surgery C1-2 fixation in 5/2019 per patient s/p fall in January, diabetes type 2 not on home insulin presents is critical care admission today from rehab, in which she was being treated following a subdural hematoma status post fall  Consult for physical therapy received and cleared by nursing for evaluation  On evaluation patient is oriented to person only  Her long term memory appears intact as patient was able to give details of past history  Pt presents with decreased functional mobility, decreased strength, pain with movement in neck, back and right knee and thigh due to central femoral line  Unable to progress to OOB to chair as medical staff arrive to implant PICC line  Pt did demonstrate ability to stand and side step with SW few feet to [facility][1]  She required most assistance with bed mobility  Her vitals were stable during mobility however did display SOB  Vitals resting /61, HR 72 bpm, post mobility /56, HR 71 bpm  Pt is functioning at her baseline and was not on O2 prior to admission  Will continue to follow during hospital stay to address goals to increase functional mobility, strength, activity tolerance while awaiting clearance for return to rehab  In summary the patient's history, examination of body system(s), activity limitations, participation restrictions, and collaboration of care are the guiding factors that were used to determine the clinical decision  The clinical presentation is unstable and therefore the assigned level of complexity is: High  Barriers to Discharge Inaccessible home environment;Decreased caregiver support   Goals   Patient Goals to not be poked at   STG Expiration Date 10/29/19   Short Term Goal #1 1  Pt will roll to right and left with CS and use of bed rails as needed for position change and to decrease risk for skin breakdown  2  Pt will perform supine <-> sit with CGA /VC of 1  3  Pt will perform sit to stand from bed/chair with CGS/VC with RW to allow for OOB activity  4  Pt will perform SPT with RW with CS  5  Pt will ambulate 40 feet with RW and CGA to allow for mobility in room   6  Pt will demonstrate stable cardiopulmonary parameters with activity  PT Treatment Day 0   Plan   Treatment/Interventions Functional transfer training;LE strengthening/ROM; Therapeutic exercise; Endurance training;Cognitive reorientation;Patient/family training;Equipment eval/education; Bed mobility;Gait training; Compensatory technique education;Spoke to nursing;OT   PT Frequency Other (Comment)  (3-5x/wk)   Recommendation   Recommendation Post acute IP rehab   Equipment Recommended   (TBD in rehab)   PT - OK to Discharge Yes   Additional Comments   (when medically cleared and going to rehab)   Barthel Index   Feeding 5   Bathing 0   Grooming Score 0   Dressing Score 5   Bladder Score 0   Bowels Score 5   Toilet Use Score 5   Transfers (Bed/Chair) Score 5   Mobility (Level Surface) Score 0   Stairs Score 0   Barthel Index Score 25       Calvin Kumar, PT

## 2019-10-22 NOTE — PROGRESS NOTES
Vancomycin Assessment    Eliana Campo is a 80 y o  female who is currently receiving vancomycin 1250mg IV every 24 hours for Pneumonia     Relevant clinical data and objective history reviewed:  Creatinine   Date Value Ref Range Status   10/21/2019 1 90 (H) 0 60 - 1 20 mg/dL Final     Comment:     Standardized to IDMS reference method   10/21/2019 1 41 (H) 0 60 - 1 20 mg/dL Final     Comment:     Standardized to IDMS reference method   10/18/2019 1 53 (H) 0 60 - 1 30 mg/dL Final     Comment:     Standardized to IDMS reference method   11/06/2015 1 19 0 60 - 1 30 mg/dL Final     Comment:     Standardized to IDMS reference method   08/27/2015 1 70 (H) 0 60 - 1 30 mg/dL Final     Comment:     Standardized to IDMS reference method   07/02/2015 1 35 (H) 0 60 - 1 30 mg/dL Final     Comment:     Standardized to IDMS reference method     /65 (BP Location: Left arm)   Pulse 70   Resp (!) 24   Ht 4' 11" (1 499 m)   Wt 92 4 kg (203 lb 11 3 oz)   SpO2 100%   BMI 41 14 kg/m²   No intake/output data recorded  Lab Results   Component Value Date/Time    BUN 48 (H) 10/21/2019 06:53 PM    BUN 20 11/06/2015 11:05 AM    WBC 11 80 (H) 10/21/2019 06:53 PM    WBC 5 88 08/27/2015 02:22 PM    HGB 9 7 (L) 10/21/2019 06:53 PM    HGB 11 9 08/27/2015 02:22 PM    HCT 30 1 (L) 10/21/2019 06:53 PM    HCT 36 2 08/27/2015 02:22 PM     10/21/2019 06:53 PM    MCV 98 08/27/2015 02:22 PM     10/21/2019 06:53 PM     08/27/2015 02:22 PM     Temp Readings from Last 3 Encounters:   10/21/19 98 8 °F (37 1 °C) (Tympanic)   10/19/19 97 7 °F (36 5 °C)   10/10/19 (!) 97 4 °F (36 3 °C)     Vancomycin Days of Therapy: 1    Assessment/Plan  The patient is currently on vancomycin utilizing scheduled dosing based on adjusted body weight (due to obesity)  Baseline risks associated with therapy include: pre-existing renal impairment    The patient is currently receiving 1000mg IV q24 hours and is clinically appropriate and dose will be continued  Pharmacy will also follow closely for s/sx of nephrotoxicity, infusion reactions and appropriateness of therapy  BMP and CBC will be ordered per protocol  Plan for trough as patient approaches steady state, prior to the 4th  dose at approximately 20 00 on 10/24/19  Due to infection severity, will target a trough of 15-20 (appropriate for most indications)   Pharmacy will continue to follow the patients culture results and clinical progress daily      Radha Carpenter, Pharmacist

## 2019-10-22 NOTE — ASSESSMENT & PLAN NOTE
Wt Readings from Last 3 Encounters:   10/21/19 92 4 kg (203 lb 11 3 oz)   10/21/19 87 5 kg (192 lb 14 4 oz)   10/17/19 86 2 kg (190 lb 0 6 oz)         · Grade 2 diastolic heart failure noted on previous echo in August 2019  · Repeat echo pending  · Current acute on chronic respiratory failure necessitating transfer to ICU and placed on BiPAP likely attributable to worsening of this chronic diastolic heart failure  · Initially given 40 Lasix x1 today by Nephrology, additional 80 Lasix IV x1 provided on admission ICU -- given additional Lasix 40mg overnight for less than desired UO   · Plan on monitoring I&O's closely with 24 hour fluid goal of net -1 L

## 2019-10-22 NOTE — ASSESSMENT & PLAN NOTE
· Transferred to the ICU this evening from rehab after notably increased work of breathing, shortness of breath, hypoxia, conversational dyspnea  · Notably decreased lung sounds throughout bilaterally  · ABG -- pH 7 1, CO2 81, pO2 156, HCO3 27 6, BE -3 1-- improved ABG recheck at 2100 to pH 7 34, CO2 55  · Placed on BiPAP with mild improvement in work of breathing  · On chart review, this appears to be likely related to her acute on chronic diastolic heart failure, which appears to be worse than baseline  · BNP earlier today over 26,000  · Stat portable CXR shows some pulmonary vascular congestion   · Initially given 40 Lasix x1 today, on admission to ICU given additional 80 Lasix x1 -- additional Lasix 40mg IV x1 given overnight for less than desired UO   · Monitor I&O's -- Fluid goal for 24 hours at -1 L  · NPO while on BiPAP and in respiratory distress  · Started on nitro drip -- titratable with goal of systolic blood pressure less than 140 -- turned off around 2100 last pm for adequate BP's  · Continue cardiac monitoring  · Pulmonary toileting as able  · Stat blood cultures x2, strep/legionella pending  · Flu negative   · Solu-Medrol 125 mg IV x1 given  · Scheduled Xopenex, Atrovent  · Abx D1, Zithromax D1, ceftriaxone D1, vancomycin D1  · Pharmacy consult placed for Vanc dosing  · Procalcitonin 0 29

## 2019-10-22 NOTE — PLAN OF CARE
Problem: PHYSICAL THERAPY ADULT  Goal: Performs mobility at highest level of function for planned discharge setting  See evaluation for individualized goals  Description  Treatment/Interventions: Functional transfer training, LE strengthening/ROM, Therapeutic exercise, Endurance training, Cognitive reorientation, Patient/family training, Equipment eval/education, Bed mobility, Gait training, Compensatory technique education, Spoke to nursing, OT  Equipment Recommended: (TBD in rehab)       See flowsheet documentation for full assessment, interventions and recommendations  Outcome: Progressing  Note:   Prognosis: Good  Problem List: Decreased strength, Decreased range of motion, Decreased endurance, Impaired balance, Decreased mobility, Decreased coordination, Decreased cognition, Decreased safety awareness, Obesity, Pain, Decreased skin integrity  Assessment: Pt admitted to [facility] from [facility] on 10/21/19  with progressive worsening of SOB resulting in acute respirataory failure with hypoxia  past medical history of chronic kidney injury, essential hypertension, hypothyroidism, history of CVA , chronic diastolic heart failure, status post aortic valve replacement, anemia,h/o malignant neoplasm of breast, SLE, RA,osteoporosis, peripheral poly neuropathy, depression, AZUL, hyperlipidemia, Cervical surgery C1-2 fixation in 5/2019 per patient s/p fall in January, diabetes type 2 not on home insulin presents is critical care admission today from rehab, in which she was being treated following a subdural hematoma status post fall  Consult for physical therapy received and cleared by nursing for evaluation  On evaluation patient is oriented to person only  Her long term memory appears intact as patient was able to give details of past history  Pt presents with decreased functional mobility, decreased strength, pain with movement in neck, back and right knee and thigh due to central femoral line   Unable to progress to OOB to chair as medical staff arrive to implant PICC line  Pt did demonstrate ability to stand and side step with SW few feet to [facility]  She required most assistance with bed mobility  Her vitals were stable during mobility however did display SOB  Vitals resting /61, HR 72 bpm, post mobility /56, HR 71 bpm  Pt is functioning at her baseline and was not on O2 prior to admission  Will continue to follow during hospital stay to address goals to increase functional mobility, strength, activity tolerance while awaiting clearance for return to rehab  In summary the patient's history, examination of body system(s), activity limitations, participation restrictions, and collaboration of care are the guiding factors that were used to determine the clinical decision  The clinical presentation is unstable and therefore the assigned level of complexity is: High  Barriers to Discharge: Inaccessible home environment, Decreased caregiver support     Recommendation: Post acute IP rehab     PT - OK to Discharge: Yes    See flowsheet documentation for full assessment

## 2019-10-22 NOTE — CONSULTS
Consultation - Cardiology   Dequan Dillon 80 y o  female MRN: [id number]  Unit/Bed#:  Encounter: [id number]    Physician Requesting Consult: Dave Castle MD  Reason for Consult / Principal Problem:     Congestive heart failure  Consulting physician:  Ana Rosa Youngblood MD      Assessment/Plan     Assessment:  1  Status post bioprosthetic aortic valve replacement  2  Congestive heart failure, historically diastolic, 71,203 up from 36,729 in August and 7347 in January  3  Acute on Chronic kidney disease, stage III, creatinine 1 9, GFR 24  4  Anemia, hemoglobin 9 7  5  Hypertension with right renal artery stenosis with subsequent stent placement  6  Acute respiratory failure with hypoxia and CO2 retention  7  Recent acute subdural hematoma from fall   8  Obstructive sleep apnea  9  Hyperlipidemia  10  Diabetes mellitus  11  History of cerebral vascular accident  15  Compression fractures of thoracic vertebrae  13  Troponin elevation without symptoms of ischemia or EKG changes of ischemia which most likely is secondary to CHF and not a myocardial infarction  Initial troponin was 3 24 with subsequent values of 3 03 and 2 85   14  Although many parameters point to patient's problem being congestive heart failure, Sudden on set of symptoms, present tachypnea which is out of proportion to auscultatory findings, and elevated D-dimer at 4 4 (less than 0 53 normal) raises the possibility of pulmonary embolism  Plan:  1  Mainstay of treatment will be diuresis  Will defer on diuretic administration to Nephrology  Patient's heart rate does not appear that patient will benefit by beta blockade  When renal insufficiency improves, it may be helpful to change amlodipine to an Ace or Arb  2  Recommend V/Q lung scan  3  Echocardiogram  4   Further recommendations following echocardiogram     History of Present Illness   HPI: Dequan Dillon is a 80y o  year old female who presents with history of aorto bioprosthetic valve replacement, chronic diastolic heart failure, essential hypertension, obstructive sleep apnea, hyperlipidemia, type 2 diabetes mellitus without chronic insulin use, prior CVA, chronic kidney disease stage 3, and hypothyroidism  She is a previous patient of Dr Rachel Champion, Cardiology at [facility] site  Patient was in rehabilitation at [facility] following a subdural hematoma from a fall  Yesterday she became acutely short of breath requiring BiPAP for stabilization and was admitted to [facility] ICU  Arterial blood gases demonstrated CO2 retention and acidosis  Chest x-ray demonstrated new small bilateral pleural effusions with possible consolidation in the left lung base  Today, patient is oriented but confused  She appears tachypneic with minimal rales and wheezing on examination        Historical Information   Past Medical History:   Diagnosis Date    Arthritis     Breast cancer ([facility] ) 2015    Cardiac disease     aortic valve transplant    [facility]     Compression fracture of body of thoracic vertebra [facility])     CVA (cerebral vascular accident) ([facility] )     Diabetes mellitus ([facility] )     Disease of thyroid gland     Fibromyalgia, primary     H/O cervical fracture 01/09/2019    Hyperlipidemia     Hypertension     Neuropathy     AZUL (obstructive sleep apnea) 10/19/2019    Pressure injury of skin     Renal disorder     kidney stent    Stroke [facility])      Past Surgical History:   Procedure Laterality Date    AORTIC VALVE SURGERY      BREAST LUMPECTOMY Right     BREAST LUMPECTOMY Left     BREAST SURGERY      lumpectomy for breast cancer    CATARACT EXTRACTION      CHOLECYSTECTOMY      HYSTERECTOMY  1978    KIDNEY SURGERY      stent placed for kidney    OTHER SURGICAL HISTORY      abdominal aneurysm surgery    TX ARTHRODESIS POSTERIOR ATLAS-AXIS C1-C2 N/A 5/1/2019    Procedure: C1-C2 lateral mass fixation fusion with possible sublaminar cables;  Surgeon: Liv Castañeda MD;  [facility]: [facility];  Service: Neurosurgery    REPLACEMENT TOTAL KNEE      left      Social History:  Social History     Substance and Sexual Activity   Alcohol Use Never    Frequency: Never    Binge frequency: Never     Social History     Substance and Sexual Activity   Drug Use No     Social History     Tobacco Use   Smoking Status Never Smoker   Smokeless Tobacco Never Used     Family History:   family history includes Arthritis in her father and mother; Dementia in her father; Diabetes in her mother; Heart disease in her mother; Heart failure in her father; No Known Problems in her daughter, maternal aunt, maternal aunt, maternal aunt, maternal aunt, maternal grandfather, maternal grandmother, paternal aunt, paternal aunt, paternal grandfather, and paternal grandmother; Other in her father      Meds/Allergies   Current Facility-Administered Medications   Medication Dose Route Frequency    acetaminophen (TYLENOL) tablet 650 mg  650 mg Oral Q6H PRN    amLODIPine (NORVASC) tablet 5 mg  5 mg Oral Daily    azithromycin (ZITHROMAX) 500 mg in sodium chloride 0 9 % 250 mL IVPB  500 mg Intravenous Q24H    cefepime (MAXIPIME) IVPB (premix) 2,000 mg  2,000 mg Intravenous Q24H    gabapentin (NEURONTIN) capsule 300 mg  300 mg Oral BID    heparin (porcine) subcutaneous injection 5,000 Units  5,000 Units Subcutaneous Q8H [address]    HYDROcodone-acetaminophen (NORCO) 5-325 mg per tablet 1 tablet  1 tablet Oral Q6H PRN    HYDROcodone-acetaminophen (NORCO) 5-325 mg per tablet 2 tablet  2 tablet Oral Q6H PRN    insulin lispro (HumaLOG) 100 units/mL subcutaneous injection 1-5 Units  1-5 Units Subcutaneous Q6H [address]    ipratropium (ATROVENT) 0 02 % inhalation solution 0 5 mg  0 5 mg Nebulization Q6H    levalbuterol (XOPENEX) 1 25 mg/0 5 mL inhalation solution **ADS Override Pull**        levalbuterol (XOPENEX) inhalation solution 1 25 mg  1 25 mg Nebulization Q6H    levothyroxine tablet 112 mcg  112 mcg Oral Daily    metoprolol tartrate (LOPRESSOR) tablet 25 mg  25 mg Oral Q12H [facility]    nystatin (MYCOSTATIN) powder   Topical BID    pantoprazole (PROTONIX) EC tablet 40 mg  40 mg Oral Early Morning    polyethylene glycol (MIRALAX) packet 17 g  17 g Oral Daily PRN    sertraline (ZOLOFT) tablet 25 mg  25 mg Oral HS    vancomycin (VANCOCIN) IVPB (premix) 1,000 mg  15 mg/kg (Adjusted) Intravenous Q24H     Allergies   Allergen Reactions    Duloxetine Hcl Other (See Comments) and Hypertension    Duloxetine Hcl      Cymbalta; Hemorrhagic stroke listed as reaction    Escitalopram      Other reaction(s): Urinary Retention    Pregabalin      Other reaction(s): Hypertension    Statins Myalgia    Tramadol      Other reaction(s): Hypertension    Triprolidine-Pse Other (See Comments)    Anastrozole Abdominal Pain    Anastrozole     Antihistamines, Diphenhydramine-Type     Exemestane      Aromasin; Muscle pain & cramps    Lexapro [Escitalopram]      Urinary retention    Lyrica [Pregabalin]      hypertension    Metformin      diarrhea    Oxycodone      Pt unsure      Statins      Muscle pain & cramps    Tramadol      hypertension    Triprolidine-Pseudoephedrine     Metformin Diarrhea       Review of Systems   Constitutional: Negative  HENT: Negative  Respiratory: Positive for shortness of breath  Negative for chest tightness  Cardiovascular: Negative for chest pain and leg swelling  Gastrointestinal: Negative  Endocrine: Negative  Genitourinary: Negative  Musculoskeletal: Negative  Skin: Negative  Allergic/Immunologic: Negative  Neurological: Negative  Hematological: Negative  Psychiatric/Behavioral: Negative  Objective   Vitals: Blood pressure 132/61, pulse 73, temperature 98 °F (36 7 °C), temperature source Temporal, resp   rate (!) 28, height 4' 11" (1 499 m), weight 83 5 kg (184 lb 1 4 oz), SpO2 95 %, not currently breastfeeding  Orthostatic Blood Pressures      Most Recent Value   Blood Pressure  132/61 filed at 10/22/2019 0941   Patient Position - Orthostatic VS  Lying filed at 10/22/2019 0400          Intake/Output Summary (Last 24 hours) at 10/22/2019 0944  Last data filed at 10/22/2019 0801  Gross per 24 hour   Intake 609 ml   Output 1345 ml   Net -736 ml     Invasive Devices     Central Venous Catheter Line            CVC Central Lines 10/21/19 Triple 16cm Right Femoral less than 1 day          Peripheral Intravenous Line            Peripheral IV 10/21/19 Left; Other (Comment) less than 1 day          Drain            Urethral Catheter Latex 16 Fr  less than 1 day                Physical Exam   Constitutional: She is oriented to person, place, and time  She appears well-developed and well-nourished  HENT:   Head: Normocephalic and atraumatic  Neck: Normal range of motion  Neck supple  No JVD present  No tracheal deviation present  No thyromegaly present  Cardiovascular: Normal rate, regular rhythm, normal heart sounds and intact distal pulses  Exam reveals no gallop and no friction rub  No murmur heard  Pulmonary/Chest: She is in respiratory distress  She has wheezes  She has rales  Patient markedly tachypneic with minimal bilateral rales and wheezes  There is also dullness in both bases  Abdominal: Soft  Bowel sounds are normal    Musculoskeletal: Normal range of motion  She exhibits no edema  Neurological: She is alert and oriented to person, place, and time  Skin: Skin is warm and dry  Psychiatric: She has a normal mood and affect   Her behavior is normal        Lab Results:     CBC with diff:   Results from last 7 days   Lab Units 10/22/19  0458   WBC Thousand/uL 6 90   RBC Million/uL 2 37*   HEMOGLOBIN g/dL 7 9*   HEMATOCRIT % 23 4*   MCV fL 99   MCH pg 33 3   MCHC g/dL 33 7   RDW % 14 9   MPV fL 7 1*   PLATELETS Thousands/uL 204     CMP:   Results from last 7 days   Lab Units 10/22/19  0454 SODIUM mmol/L 133*   POTASSIUM mmol/L 4 5   CHLORIDE mmol/L 94*   CO2 mmol/L 32*   BUN mg/dL 52*   CREATININE mg/dL 1 63*   CALCIUM mg/dL 8 7   AST U/L 50*   ALT U/L 32   ALK PHOS U/L 72   EGFR ml/min/1 73sq m 29*     Troponin:   0   Lab Value Date/Time    TROPONINI 2 85 (H) 10/22/2019 0059    TROPONINI 3 03 (H) 10/21/2019 2200    TROPONINI 3 24 (H) 10/21/2019 1853    TROPONINI 1 22 (H) 08/16/2019 0721    TROPONINI 1 24 (H) 08/16/2019 0439    TROPONINI 0 99 (H) 08/16/2019 0053     TSH:   Results from last 7 days   Lab Units 10/21/19  1853   TSH 3RD GENERATON uIU/mL 3 360     Lipid Profile:       Imaging:   I have personally reviewed pertinent reports  EKG:  Normal sinus rhythm, left axis deviation, low-voltage QRS  Abnormal EKG

## 2019-10-22 NOTE — ASSESSMENT & PLAN NOTE
Lab Results   Component Value Date    HGBA1C 5 0 08/16/2019       Recent Labs     10/21/19  0650 10/21/19  1735 10/21/19  2153 10/22/19  0057   POCGLU 103 373* 96 79       Blood Sugar Average: Last 72 hrs:   (P) 87 5     · HbA1c on 08/16/2019 -- 5  · SSI with fingerstick blood glucose q 6 hours for NPO status to maintain blood sugar 140-180

## 2019-10-22 NOTE — PROCEDURES
Insert PICC line  Date/Time: 10/22/2019 11:18 AM  Performed by: Chantal Harding RN  Authorized by: NATHEN Ronquillo     Patient location:  Bedside  Consent:     Consent obtained:  Written    Consent given by:  Patient    Risks discussed:  Arterial puncture, incorrect placement and bleeding    Alternatives discussed:  No treatment  Universal protocol:     Procedure explained and questions answered to patient or proxy's satisfaction: yes      Relevant documents present and verified: yes      Test results available and properly labeled: yes      Radiology Images displayed and confirmed  If images not available, report reviewed: yes      Required blood products, implants, devices, and special equipment available: yes      Site/side marked: yes      Immediately prior to procedure, a time out was called: yes      Patient identity confirmed:  Verbally with patient and arm band  Pre-procedure details:     Hand hygiene: Hand hygiene performed prior to insertion      Sterile barrier technique: All elements of maximal sterile technique followed      Skin preparation:  ChloraPrep    Skin preparation agent: Skin preparation agent completely dried prior to procedure    Indications:     PICC line indications: medications requiring central line and no peripheral vascular access    Anesthesia (see MAR for exact dosages):      Anesthesia method:  None  Procedure details:     Location:  Basilic    Vessel type: vein      Approach: percutaneous technique used      Patient position:  Flat    Procedural supplies:  Triple lumen    Catheter size:  5 Fr    Landmarks identified: yes      Ultrasound guidance: yes      Number of attempts:  1    Vessel of catheter tip end:  Chest Xray needed to confirm placement    Total catheter length (cm):  48    Catheter out on skin (cm):  5    Max flow rate:  5    Arm circumference:  42  Post-procedure details:     Post-procedure:  Dressing applied and securement device placed    Assessment:  Blood return through all ports and placement verification pending x-ray result    Post-procedure complications: none      Patient tolerance of procedure:   Tolerated well, no immediate complications

## 2019-10-22 NOTE — CASE MANAGEMENT
Team Discharge Summary  Pt was doing well in therapy, however struggled with shortness of breath and required work up on the acute side  TBD if Pt will be appropriate for a return to [facility]

## 2019-10-22 NOTE — ASSESSMENT & PLAN NOTE
· Previous chart review notes that she has been noncompliant with CPAP at night for past 10-15 years  · Continues on BiPAP for acute on chronic respiratory failure with hypoxia

## 2019-10-22 NOTE — ASSESSMENT & PLAN NOTE
· Initial troponin 3 24, trending downward   · All ASA, plavix, heparin being held due to recent SDH

## 2019-10-22 NOTE — PROGRESS NOTES
OT DISCHARGE SUMMARY    Pt was at [facility] for acute rehab s/p fall sustaining subdural hematoma  Pt was doing well in therapy however she began with SOB on 10/21/19 and was transferred back to acute due to medical instability  Pt made limited progress due to short length of stay on rehab and unexpected d/c back to acute  Pt would benefit from return to [facility] for therapy services when medically appropriate       Coral Heller, OT

## 2019-10-22 NOTE — UTILIZATION REVIEW
Initial Clinical Review    Admission: Date/Time/Statement: Inpatient Admission Orders (From admission, onward)     Ordered        10/21/19 1745  Inpatient Admission  Once                   Orders Placed This Encounter   Procedures    Inpatient Admission     Standing Status:   Standing     Number of Occurrences:   1     Order Specific Question:   Admitting Physician     Answer:   Thiago Melara [[id number]]     Order Specific Question:   Level of Care     Answer:   Critical Care [15]     Order Specific Question:   Estimated length of stay     Answer:   More than 2 Midnights     Order Specific Question:   Certification     Answer:   I certify that inpatient services are medically necessary for this patient for a duration of greater than two midnights  See H&P and MD Progress Notes for additional information about the patient's course of treatment  Assessment/Plan: 81 y/o female transferred from Acute Rehab Unit presents with progressively worsening SOB resulting in acute respiratory failure with hypoxia  Her initial blood gas showed pH of 7 1, CO2 of 81, PO2 156, bicarb of 27 6  She is placed on BiPAP and given a dose of Lasix 80 mg IV x1  She notably had a BNP over 26,000 today  Started on nitro gtt for SBP over 200  Received IV solumedrol  Central line inserted  On exam, on bipap, respiratory distress, tachypnea, accessory muscle use, decreased breath sounds, inspiratory and expiratory wheezes  Was in Acute Rehab Unit after admission for fall with SDH  Admitted as inpatient to Critical Care due to acute on chronic respiratory failure with hypoxia, acute on chronic CHF, HERLINDA on CKD, HTN   NPO  Continue bipap  Continue nitro gtt  Tele  Blood cx x2 pending  ATC nebs  Continue IV antibiotics  Repeat echo  Nephrology following  10/22 Transitioned to NC O2   Lethargic but arousable  Still with labored breathing, tachypnea, accessory muscle use, decreased breath sounds, inspiratory and expiratory wheezes  Continue IV diuretics  Type 2 MI with peak troponin of 3 24  PICC line inserted      Vitals   Temperature Pulse Respirations Blood Pressure SpO2   10/21/19 2000 10/21/19 1700 10/21/19 1722 10/21/19 1700 10/21/19 1722   (!) 96 5 °F (35 8 °C) 82 (!) 34 159/93 92 %      Temp Source Heart Rate Source Patient Position - Orthostatic VS BP Location FiO2 (%)   10/21/19 2000 10/21/19 1700 10/21/19 1700 10/21/19 1700 10/21/19 2000   Temporal Monitor Lying Left arm 40      Pain Score       10/21/19 1815       No Pain        Wt Readings from Last 1 Encounters:   10/22/19 83 5 kg (184 lb 1 4 oz)     Additional Vital Signs:   10/22/19 1210 98 7 °F (37 1 °C) 70 24Abnormal  124/56 100 % Nasal cannula 2L   10/22/19 0800  73 28Abnormal  127/65 100 % Bipap   10/22/19 0735  69 35Abnormal  110/45Abnormal  100 %    10/22/19 0700  64 21 86/34Abnormal  100 %    10/22/19 0600  68 22 145/72 100 %    10/22/19 0500  67 19 124/63 100 %    10/22/19 0400 98 2 °F (36 8 °C) 71 20 133/62 100 % Bipap   10/22/19 0300  69 32Abnormal  119/61 100 %    10/22/19 0200  65 20 109/48Abnormal  100 %    10/22/19 0100  67 33Abnormal  120/60 100 %    10/22/19 0000 97 2 °F (36 2 °C)  63 26Abnormal  106/56 100 %    10/21/19 2300  65 34Abnormal  107/53 100 %    10/21/19 2200  68 19 118/79 100 % Nasal cannula 6L   10/21/19 2100  66 25Abnormal  134/61 100 % Nasal cannula 6L   10/21/19 2000 96 5 °F (35 8 °C)  70 24Abnormal  143/65  Bipap   10/21/19 1900  76 29Abnormal  149/57 100 % Bipap   10/21/19 1800  81 42Abnormal  192/73Abnormal  97 % Bipap   10/21/19 1745  80 43Abnormal  178/50Abnormal  93 %    10/21/19 1730  81 42Abnormal  178/50Abnormal  88 %Abnormal  Nasal cannula 4L   10/21/19 1722  82 34Abnormal  187/78Abnormal  92 % Nasal cannula 4L       Pertinent Labs/Diagnostic Test Results:   Lab Units 10/22/19  1242 10/22/19  0458 10/21/19  1853 10/21/19  0533   WBC Thousand/uL 6 20 6 90 11 80*  --    HEMOGLOBIN g/dL 8 0* 7 9* 9 7* 8 8* HEMATOCRIT % 24 0* 23 4* 30 1* 26 4*   PLATELETS Thousands/uL 195 204 261  --    NEUTROS ABS Thousands/µL  --  5 00  --   --          Lab Units 10/22/19  0458 10/21/19  2200 10/21/19  1853 10/21/19  0533   SODIUM mmol/L 133*  --  134* 134*   POTASSIUM mmol/L 4 5 4 9 5 2* 4 3   CHLORIDE mmol/L 94*  --  91* 94*   CO2 mmol/L 32*  --  30 32*   ANION GAP mmol/L 7  --  13 8   BUN mg/dL 52*  --  48* 46*   CREATININE mg/dL 1 63*  --  1 90* 1 41*   EGFR ml/min/1 73sq m 29*  --  24* 34*   CALCIUM mg/dL 8 7  --  9 4 9 1   MAGNESIUM mg/dL 2 1  --  2 4*  --    PHOSPHORUS mg/dL 5 9*  --  7 9* 4 0     Results from last 7 days   Lab Units 10/22/19  0458 10/21/19  1853   AST U/L 50* 41*   ALT U/L 32 26   ALK PHOS U/L 72 88   TOTAL PROTEIN g/dL 6 5 7 8   ALBUMIN g/dL 3 4 4 3   TOTAL BILIRUBIN mg/dL 0 70 0 70     Lab Units 10/22/19  1112 10/22/19  0457 10/22/19  0057 10/21/19  2153 10/21/19  1735 10/21/19  0650   POC GLUCOSE mg/dl 131 88 79 96 373* 103     Lab Units 10/22/19  0458 10/21/19  1853 10/21/19  0533   GLUCOSE RANDOM mg/dL 80 224* 104*     Lab Units 10/21/19  2103 10/21/19  1747   PH ART  7 340* 7 140*   PCO2 ART mm Hg 55 0* 81 0*   PO2 ART mm Hg 127 0* 156 0*   HCO3 ART mmol/L 29 7* 27 6*   BASE EXC ART mmol/L 2 8* -3 1*   O2 CONTENT ART mL/dL 16 9 16 8   O2 HGB, ARTERIAL % 95 5 95 5   ABG SOURCE  Radial, Right Radial, Left     Results from last 7 days   Lab Units 10/22/19  0059 10/21/19  2200 10/21/19  1853   TROPONIN I ng/mL 2 85* 3 03* 3 24*     Results from last 7 days   Lab Units 10/21/19  1853   D-DIMER QUANTITATIVE ug/ml FEU 4 40*     Results from last 7 days   Lab Units 10/21/19  1853   PROTIME seconds 10 3   INR  0 94   PTT seconds 21*     Results from last 7 days   Lab Units 10/21/19  1853   TSH 3RD GENERATON uIU/mL 3 360     Results from last 7 days   Lab Units 10/22/19  0458 10/21/19  1853   PROCALCITONIN ng/ml 1 19* 0 29*     Results from last 7 days   Lab Units 10/22/19  0059 10/21/19  1852   LACTIC ACID mmol/L 1 0 1 8     Results from last 7 days   Lab Units 10/21/19  0533   NT-PRO BNP pg/mL 26,100*     Lab Units 10/21/19  0533   FERRITIN ng/mL 229         Results from last 7 days   Lab Units 10/21/19  1853   LIPASE u/L 176     Results from last 7 days   Lab Units 10/21/19  2100   CLARITY UA  Clear   COLOR UA  Straw   SPEC GRAV UA  1 015   PH UA  5 0   GLUCOSE UA mg/dl Negative   KETONES UA mg/dl Negative   BLOOD UA  Negative   PROTEIN UA mg/dl Negative   NITRITE UA  Negative   BILIRUBIN UA  Negative   UROBILINOGEN UA mg/dL Negative   LEUKOCYTES UA  Negative     Results from last 7 days   Lab Units 10/21/19  2059   STREP PNEUMONIAE ANTIGEN, URINE  Negative   LEGIONELLA URINARY ANTIGEN  Negative     10/21 EKG:  Normal sinus rhythm  Left axis deviation    10/21 CT head:  Minimal residual right parafalcine  hemorrhage in the inferior aspect of the falx  10/21 CXR:  New small effusions with possible adjacent airspace consolidation in the left lung base  10/22 CXR:  1   Interval insertion of a left-sided PICC line with tip directed laterally in the region of the SVC  2   Persistent bibasilar opacities compatible small effusions and adjacent atelectasis or infection  3   Pulmonary vascular congestion  10/22 LE venous duplex:  Impression:  RIGHT LOWER LIMB:  No evidence of acute or chronic deep vein thrombosis  No evidence of superficial thrombophlebitis noted  Doppler evaluation shows a normal response to augmentation maneuvers  Popliteal, posterior tibial and anterior tibial arterial Doppler waveforms are  biphasic  LEFT LOWER LIMB:  No evidence of acute or chronic deep vein thrombosis  No evidence of superficial thrombophlebitis noted  Doppler evaluation shows a normal response to augmentation maneuvers  Popliteal, posterior tibial and anterior tibial arterial Doppler waveforms are  Biphasic      Echo pending      Past Medical History:   Diagnosis Date    Arthritis     Breast cancer ([facility] [address] ) 2015    Cardiac disease     aortic valve transplant    CHF (congestive heart failure) ([facility])     Compression fracture of body of thoracic vertebra ([facility])     CVA (cerebral vascular accident) ([facility] )     Diabetes mellitus ([place] [address] )     Disease of thyroid gland     Fibromyalgia, primary     H/O cervical fracture 01/09/2019    Hyperlipidemia     Hypertension     Neuropathy     AZUL (obstructive sleep apnea) 10/19/2019    Pressure injury of skin     Renal disorder     kidney stent    Stroke [facility])      Present on Admission:   Acute on chronic respiratory failure with hypoxia ([facility])   Acute-on-chronic kidney injury ([place] [address] )   Acute on chronic diastolic heart failure ([facility])   Anemia   AZUL (obstructive sleep apnea)   Essential hypertension   SDH (subdural hematoma) ([facility])   Controlled type 2 diabetes mellitus with diabetic neuropathy, without long-term current use of insulin ([facility])   Elevated troponin   Morbid obesity ([facility])      Admitting Diagnosis: CHF (congestive heart failure) ([place] [address] ) [I50 9]  Age/Sex: 80 y o  female  Admission Orders:    Medications:  amLODIPine 5 mg Oral Daily   azithromycin 500 mg Intravenous Q24H   cefepime 2,000 mg Intravenous Q24H   furosemide 80 mg Intravenous Q8H   gabapentin 300 mg Oral BID   heparin (porcine) 5,000 Units Subcutaneous Q8H [facility]   insulin lispro 1-5 Units Subcutaneous TID AC   insulin lispro 1-5 Units Subcutaneous HS   ipratropium 0 5 mg Nebulization Q6H   levalbuterol      levalbuterol 1 25 mg Nebulization Q6H   levothyroxine 112 mcg Oral Daily   metoprolol tartrate 25 mg Oral Q12H KARINE   nystatin  Topical BID   pantoprazole 40 mg Oral Early Morning   sertraline 25 mg Oral HS   vancomycin 15 mg/kg (Adjusted) Intravenous Q24H          acetaminophen 650 mg Oral Q6H PRN   HYDROcodone-acetaminophen 1 tablet Oral Q6H PRN   HYDROcodone-acetaminophen 2 tablet Oral Q6H PRN   polyethylene glycol 17 g Oral Daily PRN     nitroGLYcerin (TRIDIL) 50 mg in 250 mL infusion (premix)   Rate: 1 5-60 mL/hr Dose: 5-200 mcg/min  Freq: Titrated Route: IV  Last Dose: 3 33 mcg/min (10/21/19 2106)  Start: 10/21/19 1745 End: 10/22/19 0713    IP CONSULT TO CASE MANAGEMENT  IP CONSULT TO PHARMACY  IP CONSULT TO PICC TEAM  IP CONSULT TO CARDIOLOGY  IP CONSULT TO NEPHROLOGY   Accuchecks  SCDs  Neurovascular checks Q4h  Neuro checks Q1h  VS  Dysphagia assessment  Fall precautions  Bipap  Echo  PT/OT    Network Utilization Review Department  [email or web address]  ATTENTION: Please call with any questions or concerns to [phone] and carefully listen to the prompts so that you are directed to the right person  All voicemails are confidential   Jb Marti all requests for admission clinical reviews, approved or denied determinations and any other requests to dedicated fax number below belonging to the campus where the patient is receiving treatment    FACILITY NAME UR FAX NUMBER   ADMISSION DENIALS (Administrative/Medical Necessity) [address] [facility] (Maternity/NICU/Pediatrics) [phone]   [facility] [address] [place] [address] [address] [facility] [phone]   [address] [place] [address] [facility] [phone]   Forestine Bitter [address] [facility] [address] Edwina Merlin [phone]

## 2019-10-22 NOTE — ASSESSMENT & PLAN NOTE
· Normal function of bioprosthetic valve noted on previous echo in August 2019  · Repeat echo pending

## 2019-10-23 LAB
ANION GAP SERPL CALCULATED.3IONS-SCNC: 8 MMOL/L (ref 5–14)
ARTERIAL PATENCY WRIST A: YES
BASE EXCESS BLDA CALC-SCNC: 8.9 MMOL/L (ref -2.1–2.1)
BASOPHILS # BLD AUTO: 0.1 THOUSANDS/ΜL (ref 0–0.1)
BASOPHILS NFR BLD AUTO: 1 % (ref 0–1)
BUN SERPL-MCNC: 51 MG/DL (ref 5–25)
CALCIUM SERPL-MCNC: 8.9 MG/DL (ref 8.4–10.2)
CHLORIDE SERPL-SCNC: 95 MMOL/L (ref 97–108)
CHOLEST SERPL-MCNC: 144 MG/DL
CO2 SERPL-SCNC: 33 MMOL/L (ref 22–30)
CREAT SERPL-MCNC: 1.84 MG/DL (ref 0.6–1.2)
EOSINOPHIL # BLD AUTO: 0.1 THOUSAND/ΜL (ref 0–0.4)
EOSINOPHIL NFR BLD AUTO: 2 % (ref 0–6)
ERYTHROCYTE [DISTWIDTH] IN BLOOD BY AUTOMATED COUNT: 15.1 %
GFR SERPL CREATININE-BSD FRML MDRD: 25 ML/MIN/1.73SQ M
GLUCOSE SERPL-MCNC: 111 MG/DL (ref 65–140)
GLUCOSE SERPL-MCNC: 113 MG/DL (ref 70–99)
GLUCOSE SERPL-MCNC: 155 MG/DL (ref 65–140)
GLUCOSE SERPL-MCNC: 163 MG/DL (ref 65–140)
GLUCOSE SERPL-MCNC: 262 MG/DL (ref 65–140)
HCO3 BLDA-SCNC: 33.5 MMOL/L (ref 22–26)
HCT VFR BLD AUTO: 24.1 % (ref 36–46)
HDLC SERPL-MCNC: 33 MG/DL
HGB BLD-MCNC: 8.1 G/DL (ref 12–16)
LDLC SERPL CALC-MCNC: 66 MG/DL
LYMPHOCYTES # BLD AUTO: 1.5 THOUSANDS/ΜL (ref 0.5–4)
LYMPHOCYTES NFR BLD AUTO: 23 % (ref 25–45)
MAGNESIUM SERPL-MCNC: 2.3 MG/DL (ref 1.6–2.3)
MCH RBC QN AUTO: 33.1 PG (ref 26.8–34.3)
MCHC RBC AUTO-ENTMCNC: 33.6 G/DL (ref 31.4–37.4)
MCV RBC AUTO: 98 FL (ref 80–100)
MONOCYTES # BLD AUTO: 0.5 THOUSAND/ΜL (ref 0.2–0.9)
MONOCYTES NFR BLD AUTO: 8 % (ref 1–10)
NEUTROPHILS # BLD AUTO: 4.3 THOUSANDS/ΜL (ref 1.8–7.8)
NEUTS SEG NFR BLD AUTO: 66 % (ref 45–65)
NON VENT ROOM AIR: 21 %
NONHDLC SERPL-MCNC: 111 MG/DL
O2 CT BLDA-SCNC: 10.3 ML/DL (ref 16–23)
OXYHGB MFR BLDA: 88.8 % (ref 95–98)
PCO2 BLDA: 46 MM HG (ref 35–45)
PH BLDA: 7.47 [PH] (ref 7.35–7.45)
PHOSPHATE SERPL-MCNC: 4.4 MG/DL (ref 2.5–4.8)
PLATELET # BLD AUTO: 202 THOUSANDS/UL (ref 150–450)
PMV BLD AUTO: 7.2 FL (ref 8.9–12.7)
PO2 BLDA: 53 MM HG (ref 80–105)
POTASSIUM SERPL-SCNC: 4.1 MMOL/L (ref 3.6–5)
RBC # BLD AUTO: 2.45 MILLION/UL (ref 4–5.2)
SODIUM SERPL-SCNC: 136 MMOL/L (ref 137–147)
SPECIMEN SOURCE: ABNORMAL
TRIGL SERPL-MCNC: 223 MG/DL
WBC # BLD AUTO: 6.5 THOUSAND/UL (ref 4.31–10.16)

## 2019-10-23 PROCEDURE — 82948 REAGENT STRIP/BLOOD GLUCOSE: CPT

## 2019-10-23 PROCEDURE — 82805 BLOOD GASES W/O2 SATURATION: CPT | Performed by: NURSE PRACTITIONER

## 2019-10-23 PROCEDURE — 99233 SBSQ HOSP IP/OBS HIGH 50: CPT | Performed by: INTERNAL MEDICINE

## 2019-10-23 PROCEDURE — 94640 AIRWAY INHALATION TREATMENT: CPT

## 2019-10-23 PROCEDURE — 36600 WITHDRAWAL OF ARTERIAL BLOOD: CPT

## 2019-10-23 PROCEDURE — 85025 COMPLETE CBC W/AUTO DIFF WBC: CPT | Performed by: PHYSICIAN ASSISTANT

## 2019-10-23 PROCEDURE — 94760 N-INVAS EAR/PLS OXIMETRY 1: CPT

## 2019-10-23 PROCEDURE — 84100 ASSAY OF PHOSPHORUS: CPT | Performed by: PHYSICIAN ASSISTANT

## 2019-10-23 PROCEDURE — 80061 LIPID PANEL: CPT | Performed by: PHYSICIAN ASSISTANT

## 2019-10-23 PROCEDURE — NC001 PR NO CHARGE: Performed by: NURSE PRACTITIONER

## 2019-10-23 PROCEDURE — 97530 THERAPEUTIC ACTIVITIES: CPT

## 2019-10-23 PROCEDURE — 80048 BASIC METABOLIC PNL TOTAL CA: CPT | Performed by: PHYSICIAN ASSISTANT

## 2019-10-23 PROCEDURE — 97535 SELF CARE MNGMENT TRAINING: CPT

## 2019-10-23 PROCEDURE — 97116 GAIT TRAINING THERAPY: CPT

## 2019-10-23 PROCEDURE — 99232 SBSQ HOSP IP/OBS MODERATE 35: CPT | Performed by: INTERNAL MEDICINE

## 2019-10-23 PROCEDURE — 94660 CPAP INITIATION&MGMT: CPT

## 2019-10-23 PROCEDURE — 83735 ASSAY OF MAGNESIUM: CPT | Performed by: PHYSICIAN ASSISTANT

## 2019-10-23 RX ORDER — FUROSEMIDE 10 MG/ML
80 INJECTION INTRAMUSCULAR; INTRAVENOUS
Status: DISCONTINUED | OUTPATIENT
Start: 2019-10-23 | End: 2019-10-26

## 2019-10-23 RX ORDER — AMLODIPINE BESYLATE 2.5 MG/1
2.5 TABLET ORAL DAILY
Status: DISCONTINUED | OUTPATIENT
Start: 2019-10-24 | End: 2019-10-28

## 2019-10-23 RX ORDER — AMLODIPINE BESYLATE 5 MG/1
5 TABLET ORAL DAILY
Status: DISCONTINUED | OUTPATIENT
Start: 2019-10-24 | End: 2019-10-23

## 2019-10-23 RX ORDER — FUROSEMIDE 10 MG/ML
80 INJECTION INTRAMUSCULAR; INTRAVENOUS EVERY 8 HOURS
Status: DISCONTINUED | OUTPATIENT
Start: 2019-10-23 | End: 2019-10-23

## 2019-10-23 RX ORDER — FUROSEMIDE 10 MG/ML
80 INJECTION INTRAMUSCULAR; INTRAVENOUS EVERY 8 HOURS
Status: DISCONTINUED | OUTPATIENT
Start: 2019-10-24 | End: 2019-10-23

## 2019-10-23 RX ADMIN — HEPARIN SODIUM 5000 UNITS: 5000 INJECTION INTRAVENOUS; SUBCUTANEOUS at 15:13

## 2019-10-23 RX ADMIN — LEVALBUTEROL HYDROCHLORIDE 1.25 MG: 1.25 SOLUTION, CONCENTRATE RESPIRATORY (INHALATION) at 01:20

## 2019-10-23 RX ADMIN — SERTRALINE HYDROCHLORIDE 25 MG: 50 TABLET ORAL at 21:06

## 2019-10-23 RX ADMIN — IPRATROPIUM BROMIDE 0.5 MG: 0.5 SOLUTION RESPIRATORY (INHALATION) at 01:20

## 2019-10-23 RX ADMIN — HEPARIN SODIUM 5000 UNITS: 5000 INJECTION INTRAVENOUS; SUBCUTANEOUS at 21:05

## 2019-10-23 RX ADMIN — IPRATROPIUM BROMIDE 0.5 MG: 0.5 SOLUTION RESPIRATORY (INHALATION) at 08:38

## 2019-10-23 RX ADMIN — GABAPENTIN 300 MG: 300 CAPSULE ORAL at 08:29

## 2019-10-23 RX ADMIN — LEVALBUTEROL HYDROCHLORIDE 1.25 MG: 1.25 SOLUTION, CONCENTRATE RESPIRATORY (INHALATION) at 08:40

## 2019-10-23 RX ADMIN — GABAPENTIN 300 MG: 300 CAPSULE ORAL at 17:37

## 2019-10-23 RX ADMIN — INSULIN LISPRO 2 UNITS: 100 INJECTION, SOLUTION INTRAVENOUS; SUBCUTANEOUS at 11:39

## 2019-10-23 RX ADMIN — NYSTATIN: 100000 POWDER TOPICAL at 17:37

## 2019-10-23 RX ADMIN — IPRATROPIUM BROMIDE 0.5 MG: 0.5 SOLUTION RESPIRATORY (INHALATION) at 19:50

## 2019-10-23 RX ADMIN — FUROSEMIDE 80 MG: 10 INJECTION, SOLUTION INTRAVENOUS at 06:40

## 2019-10-23 RX ADMIN — LEVALBUTEROL HYDROCHLORIDE 1.25 MG: 1.25 SOLUTION, CONCENTRATE RESPIRATORY (INHALATION) at 19:50

## 2019-10-23 RX ADMIN — METOPROLOL TARTRATE 25 MG: 25 TABLET, FILM COATED ORAL at 20:40

## 2019-10-23 RX ADMIN — FUROSEMIDE 80 MG: 10 INJECTION, SOLUTION INTRAVENOUS at 15:23

## 2019-10-23 RX ADMIN — LEVOTHYROXINE SODIUM 112 MCG: 112 TABLET ORAL at 06:35

## 2019-10-23 RX ADMIN — AMLODIPINE BESYLATE 5 MG: 5 TABLET ORAL at 08:30

## 2019-10-23 RX ADMIN — PANTOPRAZOLE SODIUM 40 MG: 40 TABLET, DELAYED RELEASE ORAL at 06:35

## 2019-10-23 RX ADMIN — NYSTATIN: 100000 POWDER TOPICAL at 08:30

## 2019-10-23 RX ADMIN — INSULIN LISPRO 1 UNITS: 100 INJECTION, SOLUTION INTRAVENOUS; SUBCUTANEOUS at 15:43

## 2019-10-23 RX ADMIN — INSULIN LISPRO 1 UNITS: 100 INJECTION, SOLUTION INTRAVENOUS; SUBCUTANEOUS at 21:05

## 2019-10-23 RX ADMIN — HEPARIN SODIUM 5000 UNITS: 5000 INJECTION INTRAVENOUS; SUBCUTANEOUS at 06:35

## 2019-10-23 RX ADMIN — METOPROLOL TARTRATE 25 MG: 25 TABLET, FILM COATED ORAL at 08:29

## 2019-10-23 NOTE — ASSESSMENT & PLAN NOTE
· Stat CT head -- Minimal residual right parafalcine hemorrhage in the inferior aspect of the falx    · Serial neuro exams  · Continuing to hold ASA, plavix   · DVT ppx with Heparin SQ initiated 10/22/19

## 2019-10-23 NOTE — NURSING NOTE
Tolerating BiPAP and more awake  Denies pain at rest   Repositioned for pressure relief  Tolerates fairly well  No change in assessment otherwise

## 2019-10-23 NOTE — ASSESSMENT & PLAN NOTE
· Noted on chart review to have chronic renal insufficiency with creatinine baseline appearing to be about 1 5-1 6  · Heller in place for accurate I&O's   · Nephrology following, who agrees with scheduled Lasix, and who orders Renal US -- pending   · Avoid nephrotoxic agents  · Trend renal indices on serial BMPs  · Creat improving to 1 6 -- trend

## 2019-10-23 NOTE — ASSESSMENT & PLAN NOTE
· Transferred to the ICU 10/21 from rehab after notably increased work of breathing, shortness of breath, hypoxia, conversational dyspnea, notably decreased lung sounds throughout bilaterally  · ABG -- pH 7 1, CO2 81, pO2 156, HCO3 27 6, BE -3 1-- improved ABG recheck at 2100 to pH 7 34, CO2 55  · Placed on BiPAP with mild improvement in work of breathing  · On chart review, this appears to be likely related to her acute on chronic diastolic heart failure, which appears to be worse than baseline  · BNP over 26,000 on admission   · Stat portable CXR showed some pulmonary vascular congestion   · Lasix decreased to 80 mg IV BID - managed by nephrology   · Monitor I&O's -- Fluid goal for 24 hours at -1 L  · Weaned from BiPAP at 0800 10/22/19 and remained on NC throughout the day with satisfactory O2 sats   · Continue BiPAP HS and PRN for increased WOB - will leave bipap off overnight with an overnight pulse ox study on 2 L NC, repeat ABG in am to determine patient need for home bipap  · Pulmonary toileting as able  · Stat blood cultures negative, strep/legionella pending negative   · Flu negative   · Solu-Medrol 125 mg IV x1 given  · Scheduled Xopenex, Atrovent  · All abx discontinued 10/22/19  · Overall improved from admission

## 2019-10-23 NOTE — ASSESSMENT & PLAN NOTE
· Previous chart review notes that she has been noncompliant with CPAP at night for past 10-15 years  · Continue BiPAP HS and PRN for increased WOB

## 2019-10-23 NOTE — PHYSICAL THERAPY NOTE
Physical Therapy Treatment Note    Time in/out [id number]       10/23/19 1035   Pain Assessment   Pain Assessment No/denies pain   Pain Score No Pain   Restrictions/Precautions   Weight Bearing Precautions Per Order No   Other Precautions O2;Fall Risk;Pain   General   Chart Reviewed Yes   Family/Caregiver Present No   Cognition   Arousal/Participation Cooperative   Attention Attends with cues to redirect   Orientation Level Oriented to person;Oriented to place   Subjective   Subjective I am thristy  ( on fluid restriction/proved what is available in cup)   Transfers   Sit to Stand 3  Moderate assistance   Additional items Assist x 1   Stand to Sit 4  Minimal assistance   Additional items Verbal cues; Increased time required  (for hand placement)   Ambulation/Elevation   Gait pattern Excessively slow;Decreased foot clearance   Gait Assistance 4  Minimal assist   Additional items Assist x 1   Assistive Device Rolling walker   Distance 1 foot x 3   Balance   Static Sitting Fair   Static Standing Poor +   Ambulatory Poor +   Endurance Deficit   Endurance Deficit Yes   Endurance Deficit Description   (expresses being tired)   Activity Tolerance   Activity Tolerance Patient limited by fatigue   Nurse Made Aware RN clears for treatment   Exercises   Balance training    (lateral weight shift in standing)   Assessment   Prognosis Good   Problem List Decreased strength;Decreased range of motion;Decreased endurance; Impaired balance;Decreased mobility; Decreased coordination;Obesity;Pain   Assessment Cardiology note appreciated  Pt OOb in chair  Nursing in during session to take patient of telemonitor and remove gomez catheter  Pt on 2 LPM NC  Pt agreeable for treatment  Mod assist initially for sit to stand transfer and mod assist for balance to attain upright posture with RW for UE support however min assist on additional trials  Pt with decreased weight shift laterally to offload either extremity   Therapist has to assist with lateral weight shift to allow offload  Pt with decreased clearance  She is able to take 8 steps forward and backward x 3 with RW  Vitals at rest seated /47, HR 63 bpm and following session with upright activity /57, HR 64 bpm   Pt note to have some SOB following activity  SpO2 100% on 2 LPM  Pt required mod assist to position back in chair  Continue to follow and progress ambulation next visit  Will need longer O2 tubing to advance distance > 4 feet  Barriers to Discharge Inaccessible home environment;Decreased caregiver support   Goals   Patient Goals to  make sure my legs are strong   PT Treatment Day 1   Plan   Treatment/Interventions Functional transfer training;LE strengthening/ROM; Endurance training;Patient/family training;Equipment eval/education;Gait training;Spoke to nursing   Progress Slow progress, decreased activity tolerance   PT Frequency Other (Comment)  (3-5x/wk)   Recommendation   Recommendation Post acute IP rehab   PT - OK to Discharge Yes   Additional Comments   (when medically stable and going to rehab)

## 2019-10-23 NOTE — PROGRESS NOTES
Progress Note - Veralibeatriz Dates 1934, 80 y o  female MRN: [id number]    Unit/Bed#:  Encounter: [id number]    Primary Care Provider: Espinoza Martin DO   Date and time admitted to hospital: 10/21/2019  5:07 PM        * Acute on chronic respiratory failure with hypoxia ([address] )  Assessment & Plan  · Transferred to the ICU 10/21 from rehab after notably increased work of breathing, shortness of breath, hypoxia, conversational dyspnea, notably decreased lung sounds throughout bilaterally  · ABG -- pH 7 1, CO2 81, pO2 156, HCO3 27 6, BE -3 1-- improved ABG recheck at 2100 to pH 7 34, CO2 55  · Placed on BiPAP with mild improvement in work of breathing  · On chart review, this appears to be likely related to her acute on chronic diastolic heart failure, which appears to be worse than baseline  · BNP over 26,000  · Stat portable CXR shows some pulmonary vascular congestion   · Lasix scheduled 80mg IV Q8H to maintain at least 100mL/hr UO  · Monitor I&O's -- Fluid goal for 24 hours at -1 L  · Weaned from BiPAP at 0800 yesterday and remained on NC throughout the day with satisfactory O2 sats   · Continue BiPAP HS and PRN for increased WOB  · Continue cardiac monitoring  · Pulmonary toileting as able  · Stat blood cultures negative, strep/legionella pending negative   · Flu negative   · Solu-Medrol 125 mg IV x1 given  · Scheduled Xopenex, Atrovent  · All abx discontinued yesterday   · Overall improved from admission     Acute on chronic diastolic heart failure [facility])  Assessment & Plan  Wt Readings from Last 3 Encounters:   10/22/19 83 5 kg (184 lb 1 4 oz)   10/21/19 87 5 kg (192 lb 14 4 oz)   10/17/19 86 2 kg (190 lb 0 6 oz)         · Grade 2 diastolic heart failure noted on previous echo in August 2019  Repeat echo 10/22 -- Moderate left ventricular systolic dysfunction, EF 21%  Severe hypokinesis to akinesis in the distal half of the anterior, lateral and inferior walls with akinesis of the distal 3rd of the septum  The apex is akinetic to paradoxical  The base of the heart contracts vigorously -- findings could be consistent with Takotsubo's syndrome; Grade 2 left ventricular diastolic dysfunction (pseudonormalization)   Moderately elevated left ventricular filling pressures; mod to severe MR; mild to mod pulmonary HTN  · Current acute on chronic respiratory failure necessitating transfer to ICU and placed on BiPAP likely attributable to worsening of this chronic diastolic heart failure -- this appears to be improving as she has been liberated from constant respiratory support with BiPAP and has had improved UO, making her net negative overall  · Lasix IV 80mg Q8H scheduled  · Plan on monitoring I&O's closely with 24 hour fluid goal of net -1 L  · Heller remains present for accurate I&O's     Elevated troponin  Assessment & Plan  · Troponin peak 3 24 -- stop trend   · All ASA, plavix, heparin being held due to recent SDH    Acute-on-chronic kidney injury ([facility] [address] )  Assessment & Plan  · Noted on chart review to have chronic renal insufficiency with creatinine baseline appearing to be about 1 5-1 6  · Heller in place for accurate I&O's   · Nephrology following, who agrees with scheduled Lasix, and who orders Renal US -- pending   · Avoid nephrotoxic agents  · Trend renal indices on serial BMPs  · Creat improving to 1 6 -- trend     Anemia  Assessment & Plan  · Baseline hemoglobin appears to be 9-10  · No overt s/s of bleeding -- continue to monitor  · Trend on serial hemoglobins  · Plan for transfusion if hemoglobin falls less than 7  · T&S sent     Controlled type 2 diabetes mellitus with diabetic neuropathy, without long-term current use of insulin [facility])  Assessment & Plan  Lab Results   Component Value Date    HGBA1C 5 0 08/16/2019       Recent Labs     10/22/19  0457 10/22/19  1112 10/22/19  1601 10/22/19  2107   POCGLU 88 131 136 133       Blood Sugar Average: Last 72 hrs:   (P) 110 5     · HbA1c on 08/16/2019 -- 5  · SSI AC/HS with CHO controlled meals to maintain glucose 140-180    AZUL (obstructive sleep apnea)  Assessment & Plan  · Previous chart review notes that she has been noncompliant with CPAP at night for past 10-15 years  · Continue BiPAP HS and PRN for increased WOB    SDH (subdural hematoma) (HCC)  Assessment & Plan  · Stat CT head -- Minimal residual right parafalcine hemorrhage in the inferior aspect of the falx  · Serial neuro exams  · Continuing to hold ASA, plavix   · DVT ppx with Heparin SQ initiated yesterday    Essential hypertension  Assessment & Plan  · Continue home norvasc and metoprolol   · Nitroglycerin gtt titrated off and remained off throughout the day yesterday with stable BP's  · Maintain SBP <160    Morbid obesity (HCC)  Assessment & Plan  · Noted     ----------------------------------------------------------------------------------------  HPI/24hr events: She was weaned from BiPAP at approx 0800 yesterday and remained on NC throughout the day  Lasix 80 Q8H scheduled was initiated, and she is net negative 1  12L for 24H  Hemoglobin stable  HD stable  Echo shows new decrease in EF with severe hypokinesis to akinesis in the inferior walls and septum with progression to grade 2 diastolic dysfunction  Her R fem TLC was removed and a L PICC line was placed  She endorses no complaints  Disposition: Transfer to Stepdown Level 1   Code Status: Level 1 - Full Code  ---------------------------------------------------------------------------------------  SUBJECTIVE  "I feel good"    Review of Systems   Constitutional: Negative  HENT: Negative  Eyes: Negative  Respiratory: Positive for shortness of breath and wheezing  Negative for cough, choking and chest tightness  Cardiovascular: Negative for chest pain, palpitations and leg swelling  Gastrointestinal: Negative  Endocrine: Negative  Genitourinary: Negative  Musculoskeletal: Negative  Allergic/Immunologic: Negative      Neurological: Negative  Hematological: Negative  Psychiatric/Behavioral: Negative  All other systems reviewed and are negative  Review of systems was reviewed and negative unless stated above in HPI/24-hour events   ---------------------------------------------------------------------------------------  OBJECTIVE    Physical Exam   Constitutional: She is oriented to person, place, and time  She appears well-developed and well-nourished  She is sleeping and cooperative  She is easily aroused  Non-toxic appearance  She does not appear ill  No distress  HENT:   Head: Normocephalic and atraumatic  Mouth/Throat: Oropharynx is clear and moist and mucous membranes are normal  No oropharyngeal exudate  Eyes: Pupils are equal, round, and reactive to light  EOM and lids are normal  Right eye exhibits no discharge  Left eye exhibits no discharge  No scleral icterus  Pupils 4mm PERRLA BL   Neck: Normal range of motion  Neck supple  No tracheal deviation present  Cardiovascular: Normal rate, regular rhythm, normal heart sounds and intact distal pulses  Exam reveals no gallop and no friction rub  No murmur heard  Pulses:       Radial pulses are 2+ on the right side, and 2+ on the left side  Dorsalis pedis pulses are 2+ on the right side, and 2+ on the left side  Pulmonary/Chest: Accessory muscle usage (mild) present  No respiratory distress  She has decreased breath sounds  She has wheezes  She exhibits no tenderness  Abdominal: Soft  Bowel sounds are normal  She exhibits no distension  There is no tenderness  Obese appearance    Genitourinary:   Genitourinary Comments: Pina present    Musculoskeletal: Normal range of motion  She exhibits no edema, tenderness or deformity  Lymphadenopathy:     She has no cervical adenopathy  Neurological: She is alert, oriented to person, place, and time and easily aroused  No cranial nerve deficit  GCS eye subscore is 4  GCS verbal subscore is 5   GCS motor subscore is 6    Skin: Skin is warm and dry  Capillary refill takes less than 2 seconds  No rash noted  She is not diaphoretic  No erythema  No pallor  Psychiatric: She has a normal mood and affect  Her speech is normal and behavior is normal  Judgment and thought content normal    Nursing note and vitals reviewed  Vitals   Vitals:    10/23/19 0120 10/23/19 0200 10/23/19 0300 10/23/19 0400   BP:  107/52 (!) 106/49 (!) 132/48   BP Location:    Left arm   Pulse:  61 66 63   Resp:  (!) 23 (!) 23 13   Temp:    98 °F (36 7 °C)   TempSrc:    Temporal   SpO2: 100% 100% 100% 100%   Weight:    82 8 kg (182 lb 8 7 oz)   Height:         Temp (24hrs), Av 2 °F (36 8 °C), Min:97 9 °F (36 6 °C), Max:98 7 °F (37 1 °C)  Current: Temperature: 98 °F (36 7 °C)      Invasive/non-invasive ventilation settings   Respiratory    Lab Data (Last 4 hours)    None         O2/Vent Data (Last 4 hours)    None                Height and Weights   Height: 4' 11" (149 9 cm)  IBW: 43 2 kg  Body mass index is 36 87 kg/m²  Weight (last 2 days)     Date/Time   Weight    10/23/19 0400   82 8 (182 54)    10/22/19 0400   83 5 (184 08)    10/21/19 1900   92 4 (203 71)                Intake and Output  I/O       10/21 0701 - 10/22 0700 10/22 0701 - 10/23 0700    P  O   560    I V  (mL/kg) 109 (1 3)     IV Piggyback 500     Total Intake(mL/kg) 609 (7 3) 560 (6 7)    Urine (mL/kg/hr) 995 1680 (0 8)    Total Output 995 1680    Net -386 -1120                Nutrition       Diet Orders   (From admission, onward)             Start     Ordered    10/22/19 1200  Diet Cardiovascular; Cardiac; Fluid Restriction 1200 ML, Consistent Carbohydrate Diet Level 2 (5 carb servings/75 grams CHO/meal)  Diet effective now     Question Answer Comment   Diet Type Cardiovascular    Cardiac Cardiac    Other Restriction(s): Fluid Restriction 1200 ML    Other Restriction(s): Consistent Carbohydrate Diet Level 2 (5 carb servings/75 grams CHO/meal)    RD to adjust diet per protocol? Yes        10/22/19 1200                Laboratory and Diagnostics:  Results from last 7 days   Lab Units 10/23/19  0451 10/22/19  1242 10/22/19  0458 10/21/19  1853 10/21/19  0533 10/18/19  0513 10/17/19  0443   WBC Thousand/uL 6 50 6 20 6 90 11 80*  --  6 25 6 06   HEMOGLOBIN g/dL 8 1* 8 0* 7 9* 9 7* 8 8* 8 3* 8 7*   HEMATOCRIT % 24 1* 24 0* 23 4* 30 1* 26 4* 26 9* 27 3*   PLATELETS Thousands/uL 202 195 204 261  --  193 186   NEUTROS PCT % 66*  --  74*  --   --  62 64   MONOS PCT % 8  --  7  --   --  8 7     Results from last 7 days   Lab Units 10/23/19  0451 10/22/19  0458 10/21/19  2200 10/21/19  1853 10/21/19  0533 10/18/19  0513 10/17/19  0443   SODIUM mmol/L 136* 133*  --  134* 134* 138 139   POTASSIUM mmol/L 4 1 4 5 4 9 5 2* 4 3 4 1 4 5   CHLORIDE mmol/L 95* 94*  --  91* 94* 103 103   CO2 mmol/L 33* 32*  --  30 32* 28 30   ANION GAP mmol/L 8 7  --  13 8 7 6   BUN mg/dL 51* 52*  --  48* 46* 37* 33*   CREATININE mg/dL 1 84* 1 63*  --  1 90* 1 41* 1 53* 1 50*   CALCIUM mg/dL 8 9 8 7  --  9 4 9 1 8 6 8 6   GLUCOSE RANDOM mg/dL 113* 80  --  224* 104* 96 89   ALT U/L  --  32  --  26  --   --   --    AST U/L  --  50*  --  41*  --   --   --    ALK PHOS U/L  --  72  --  88  --   --   --    ALBUMIN g/dL  --  3 4  --  4 3  --   --   --    TOTAL BILIRUBIN mg/dL  --  0 70  --  0 70  --   --   --      Results from last 7 days   Lab Units 10/23/19  0451 10/22/19  0458 10/21/19  1853 10/21/19  0533   MAGNESIUM mg/dL 2 3 2 1 2 4*  --    PHOSPHORUS mg/dL 4 4 5 9* 7 9* 4 0      Results from last 7 days   Lab Units 10/21/19  1853   INR  0 94   PTT seconds 21*      Results from last 7 days   Lab Units 10/22/19  0059 10/21/19  2200 10/21/19  1853   TROPONIN I ng/mL 2 85* 3 03* 3 24*     Results from last 7 days   Lab Units 10/22/19  0059 10/21/19  1852   LACTIC ACID mmol/L 1 0 1 8     ABG:  Results from last 7 days   Lab Units 10/21/19  2103   PH ART  7 340*   PCO2 ART mm Hg 55 0*   PO2 ART mm Hg 127 0*   HCO3 ART mmol/L 29 7* BASE EXC ART mmol/L 2 8*   ABG SOURCE  Radial, Right     VBG:  Results from last 7 days   Lab Units 10/21/19  2103  10/16/19  2209   PH KHALIF   --   --  7 331   PCO2 KHALIF mm Hg  --   --  59 5*   PO2 KHALIF mm Hg  --   --  22 9*   HCO3 KHALIF mmol/L  --   --  30 7*   BASE EXC KHALIF mmol/L  --   --  3 8   ABG SOURCE  Radial, Right   < >  --     < > = values in this interval not displayed  Results from last 7 days   Lab Units 10/22/19  0458 10/21/19  1853   PROCALCITONIN ng/ml 1 19* 0 29*       Micro  Results from last 7 days   Lab Units 10/21/19  2059 10/21/19  1909   BLOOD CULTURE   --  No Growth at 24 hrs  LEGIONELLA URINARY ANTIGEN  Negative  --    STREP PNEUMONIAE ANTIGEN, URINE  Negative  --        EKG: NSR on tele   Imaging: I have personally reviewed pertinent reports  XR chest PICC line portable   Final Result   1  Interval insertion of a left-sided PICC line with tip directed laterally in the region of the SVC  2   Persistent bibasilar opacities compatible small effusions and adjacent atelectasis or infection  3   Pulmonary vascular congestion  Workstation performed: [id number]         VAS lower limb venous duplex study, complete bilateral   Final Result      CT head wo contrast   Final Result      Minimal residual right parafalcine  hemorrhage in the inferior aspect of the falx  Workstation performed: [id number]         XR chest portable ICU   Final Result      Mild central pulmonary vascular congestion and small basilar effusions, grossly unchanged              Workstation performed: [id number]         VAS renal artery complete    (Results Pending)         Active Medications  Scheduled Meds:    Current Facility-Administered Medications:  acetaminophen 650 mg Oral Q6H PRN GENIACALICE   amLODIPine 5 mg Oral Daily GENIAC, [place]   [START ON 10/24/2019] furosemide 80 mg Intravenous Q8H NATHEN Queen   gabapentin 300 mg Oral BID GENIACALICE   heparin (porcine) 5,000 Units Subcutaneous [facility] Tushar Begum PA-C   HYDROcodone-acetaminophen 1 tablet Oral Q6H PRN Tushar Begum PA-C   HYDROcodone-acetaminophen 2 tablet Oral Q6H PRN Tushar Begum PA-C   insulin lispro 1-5 Units Subcutaneous TID AC Levon Franks PA-C   insulin lispro 1-5 Units Subcutaneous HS Tushar Begum PA-C   ipratropium 0 5 mg Nebulization Q6H Keila Bolus, MD   levalbuterol 1 25 mg Nebulization Q6H Keila Bolus, MD   levothyroxine 112 mcg Oral Daily Naveen Green MD   metoprolol tartrate 25 mg Oral Q12H [facility] Tushar Begum PA-C   nystatin  Topical BID Naveen Green MD   pantoprazole 40 mg Oral Early Morning Tushar Begum PA-C   polyethylene glycol 17 g Oral Daily PRN Naveen Green MD   sertraline 25 mg Oral HS Levon Franks PA-C     Continuous Infusions:     PRN Meds:     acetaminophen 650 mg Q6H PRN   HYDROcodone-acetaminophen 1 tablet Q6H PRN   HYDROcodone-acetaminophen 2 tablet Q6H PRN   polyethylene glycol 17 g Daily PRN       ---------------------------------------------------------------------------------------  Advance Directive and Living Will: Yes    Power of : Yes  POLST:    ---------------------------------------------------------------------------------------    NATHEN Cohen        Portions of the record may have been created with voice recognition software  Occasional wrong word or "sound a like" substitutions may have occurred due to the inherent limitations of voice recognition software    Read the chart carefully and recognize, using context, where substitutions have occurred

## 2019-10-23 NOTE — ASSESSMENT & PLAN NOTE
Lab Results   Component Value Date    HGBA1C 5 0 08/16/2019       Recent Labs     10/22/19  0457 10/22/19  1112 10/22/19  1601 10/22/19  2107   POCGLU 88 131 136 133       Blood Sugar Average: Last 72 hrs:   (P) 110 5     · HbA1c on 08/16/2019 -- 5  · SSI AC/HS with CHO controlled meals to maintain glucose 140-180

## 2019-10-23 NOTE — ASSESSMENT & PLAN NOTE
Lab Results   Component Value Date    HGBA1C 5 0 08/16/2019       Recent Labs     10/22/19  1601 10/22/19  2107 10/23/19  0450 10/23/19  1129   POCGLU 136 133 111 262*       Blood Sugar Average: Last 72 hrs:   (P) 129 5     · HbA1c on 08/16/2019 -- 5  · SSI AC/HS with CHO controlled meals to maintain glucose 140-180

## 2019-10-23 NOTE — ASSESSMENT & PLAN NOTE
· Transferred to the ICU 10/21 from rehab after notably increased work of breathing, shortness of breath, hypoxia, conversational dyspnea, notably decreased lung sounds throughout bilaterally  · ABG -- pH 7 1, CO2 81, pO2 156, HCO3 27 6, BE -3 1-- improved ABG recheck at 2100 to pH 7 34, CO2 55  · Placed on BiPAP with mild improvement in work of breathing  · On chart review, this appears to be likely related to her acute on chronic diastolic heart failure, which appears to be worse than baseline  · BNP over 26,000  · Stat portable CXR shows some pulmonary vascular congestion   · Lasix scheduled 80mg IV Q8H to maintain at least 100mL/hr UO  · Monitor I&O's -- Fluid goal for 24 hours at -1 L  · Weaned from BiPAP at 0800 yesterday and remained on NC throughout the day with satisfactory O2 sats   · Continue BiPAP HS and PRN for increased WOB  · Continue cardiac monitoring  · Pulmonary toileting as able  · Stat blood cultures negative, strep/legionella pending negative   · Flu negative   · Solu-Medrol 125 mg IV x1 given  · Scheduled Xopenex, Atrovent  · All abx discontinued yesterday   · Overall improved from admission

## 2019-10-23 NOTE — ASSESSMENT & PLAN NOTE
Wt Readings from Last 3 Encounters:   10/23/19 82 8 kg (182 lb 8 7 oz)   10/21/19 87 5 kg (192 lb 14 4 oz)   10/17/19 86 2 kg (190 lb 0 6 oz)         · Grade 2 diastolic heart failure noted on previous echo in August 2019  Repeat echo 10/22 -- Moderate left ventricular systolic dysfunction, EF 54%  Severe hypokinesis to akinesis in the distal half of the anterior, lateral and inferior walls with akinesis of the distal 3rd of the septum  The apex is akinetic to paradoxical  The base of the heart contracts vigorously -- findings could be consistent with Takotsubo's syndrome; Grade 2 left ventricular diastolic dysfunction (pseudonormalization)   Moderately elevated left ventricular filling pressures; mod to severe MR; mild to mod pulmonary HTN  · Current acute on chronic respiratory failure necessitating transfer to ICU and placed on BiPAP likely attributable to worsening of this chronic diastolic heart failure -- this appears to be improving as she has been liberated from constant respiratory support with BiPAP and has had improved UO, making her net negative overall  · Lasix decreased to 80 mg IV BID

## 2019-10-23 NOTE — PLAN OF CARE
Problem: OCCUPATIONAL THERAPY ADULT  Goal: Performs self-care activities at highest level of function for planned discharge setting  See evaluation for individualized goals  Description  Treatment Interventions: ADL retraining, Functional transfer training, UE strengthening/ROM, Endurance training, Cognitive reorientation, Continued evaluation          See flowsheet documentation for full assessment, interventions and recommendations  Outcome: Progressing  Note:   Limitation: Decreased ADL status, Decreased UE ROM, Decreased UE strength, Decreased Safe judgement during ADL, Decreased cognition, Decreased endurance, Decreased high-level ADLs  Prognosis: Good  Assessment: Pt seen for OT txt  Pt pleasant, received in chair  Pt reports fatigue from physical therapy session  Pt completed 3 trials sit-->stand, verbal cues for hand placement and posture, noted improved body mechanics with repeat trials  Pt's O2 ranging 93-95% throughout while on supplemental O2 via NC  Pt continues to require maxA for LB dressing to don underwear  Pt overall demonstrating improved activity tolerance vs initial evaluation, remains limited in functional UE strength, standing tolerance, and dynamic balance  Pt would benefit from continued OT services to further address deficits and increase independence with ADLs  Continue OT POC        OT Discharge Recommendation: Short Term Rehab(acute IP rehab )

## 2019-10-23 NOTE — ASSESSMENT & PLAN NOTE
· Stat CT head -- Minimal residual right parafalcine hemorrhage in the inferior aspect of the falx    · Serial neuro exams  · Continuing to hold ASA, plavix   · DVT ppx with Heparin SQ initiated yesterday

## 2019-10-23 NOTE — ASSESSMENT & PLAN NOTE
· Continue home norvasc and metoprolol   · Nitroglycerin gtt titrated off and remained off throughout the day yesterday with stable BP's  · Maintain SBP <160

## 2019-10-23 NOTE — NURSING NOTE
Pt had gomez removed this am in ICU, did not void yetm scanned for 118cc, will monitor, no discomfort

## 2019-10-23 NOTE — PROGRESS NOTES
NEPHROLOGY PROGRESS NOTE   Eliana Campo 80 y o  female MRN: [id number]  Unit/Bed#:  Encounter: [id number]  Reason for Consult: HERLINDA/CKD    ASSESSMENT/PLAN:  1  Acute kidney injury:  Suspect secondary to fluctuations in blood pressure variability, relative hypotension, in the setting of acute pulmonary edema  -status post IV Lasix 80 mg t i d   -creatinine increased to 1 90 decreased 1 63 and currently 1 84-may require higher creatinine to keep euvolemic  -continue to follow I/O, lab values in volume status  -weight is decreasing and currently 82 8 kg  -avoid nephrotoxins  -avoid hypotension  -will increase hold parameters on amlodipine to SBP less than 130  -will decrease IV Lasix to 80 mg b i d  Today    2  Chronic kidney disease III:  Suspect etiology secondary to hypertensive nephrosclerosis, known renal artery stenosis, age-related nephron loss and prior episodes of AK I could be a component  -previously followed Dr Johnson Becker but was last seen in 2015  -after review of the medical records baseline appears to be 1 2-1 3 however has had elevation in creatinine recently in August to 1 5  -will need follow-up on discharge    3  Diastolic congestive heart failure: With acute on chronic respiratory failure with hypoxia  -critical care team following  -Repeat echocardiogram done 10/22/2019:  Reports "EF of 41% with severe hypokinesis and akinesis in the distal half of the anterior, lateral and inferior walls with akinesis of the distal 3rd of the septum  There was concern this is secondary to coronary artery disease and also consistent with Takotsubo's syndrome " Grade 2 diastolic dysfunction  Also seen mild-to-moderate pulmonary hypertension  46mmhg,   -cardiology following  -renal artery Doppler ordered-scheduled for tomorrow-will monitor (of note renal artery stenosis can cause pulmonary edema and some cases)    4   Hypertension:  BP on the lower side  -avoid hypotension to prevent decreased renal perfusion  -increased hold parameters on amlodipine for SBP less than 130  -patient received a m  Dose this morning  -will continue to trend    5  Recent subdural hematoma:  Previously and arc  -will return once medically stable for ongoing rehab    6  Volume:  Continues with lower extremity edema  -will continue IV Lasix however will change to b i d  For now  -will continue to trend I/O, lab values in volume status    7  Anemia:  Hemoglobin stable at 8 1  -per primary team  -consider transfusion for hemoglobin less than 7 0 or symptomatic  -iron stores on 10/21/2019 within normal limits-iron saturations 23%, iron 62, ferritin 229, folate > 20      SUBJECTIVE:  Patient seen and examined  Denies chest pain  Reports breathing is better  Tolerated BiPAP overnight  Per RN no acute events overnight       OBJECTIVE:  Current Weight: Weight - Scale: 82 8 kg (182 lb 8 7 oz)  Vitals:    10/23/19 0500 10/23/19 0600 10/23/19 0800 10/23/19 0829   BP: 116/50 (!) 97/42 116/50 116/50   BP Location:       Pulse: 69 61 61 69   Resp: 18 19 (!) 34 22   Temp:   97 8 °F (36 6 °C)    TempSrc:   Temporal    SpO2: 95% 99% 100% 100%   Weight:       Height:           Intake/Output Summary (Last 24 hours) at 10/23/2019 0934  Last data filed at 10/23/2019 0931  Gross per 24 hour   Intake 660 ml   Output 1975 ml   Net -1315 ml     General:  No acute distress, cooperative, out of bed  Skin:  Warm and dry without rash  HEENT:  Mucous membranes moist  Neck:  Supple without JVD noted  Lungs:  Scattered crackles noted bilaterally, expiratory wheezes, decreased bases  Cardiac:  Regular rate and rhythm, audible murmur  Extremities:  Trace lower extremity edema noted bilaterally  GI:  Soft, nontender, no distention, active bowel sounds  Neuro:  Alert oriented and awake  Psych:  Appropriate affect    Medications:    Current Facility-Administered Medications:     acetaminophen (TYLENOL) tablet 650 mg, 650 mg, Oral, Q6H PRN, Kelton Leiva PA-C, 650 mg at 10/22/19 1541    [START ON 10/24/2019] amLODIPine (NORVASC) tablet 5 mg, 5 mg, Oral, Daily, Ivyrenetta Maldonado, LISSETTENP    furosemide (LASIX) injection 80 mg, 80 mg, Intravenous, BID (diuretic), Ivy Confer, CRNP    gabapentin (NEURONTIN) capsule 300 mg, 300 mg, Oral, BID, Nadadrian Peguero, PA-C, 300 mg at 10/23/19 9194    heparin (porcine) subcutaneous injection 5,000 Units, 5,000 Units, Subcutaneous, Q8H [address], 5,000 Units at 10/23/19 0525 **AND** [CANCELED] Platelet count, , , Once, Levon Franks PA-C    HYDROcodone-acetaminophen (NORCO) 5-325 mg per tablet 1 tablet, 1 tablet, Oral, Q6H PRN, Adarsh Peguero PA-C    HYDROcodone-acetaminophen (NORCO) 5-325 mg per tablet 2 tablet, 2 tablet, Oral, Q6H PRN, Adarsh Peguero PA-C    insulin lispro (HumaLOG) 100 units/mL subcutaneous injection 1-5 Units, 1-5 Units, Subcutaneous, TID AC **AND** Fingerstick Glucose (POCT), , , TID AC, Levon Franks PA-C    insulin lispro (HumaLOG) 100 units/mL subcutaneous injection 1-5 Units, 1-5 Units, Subcutaneous, HS, Levon Franks PA-C    ipratropium (ATROVENT) 0 02 % inhalation solution 0 5 mg, 0 5 mg, Nebulization, Q6H, Donita Bolaños MD, 0 5 mg at 10/23/19 0838    levalbuterol (Iraida Showers) inhalation solution 1 25 mg, 1 25 mg, Nebulization, Q6H, Donita Bolaños MD, 1 25 mg at 10/23/19 0840    levothyroxine tablet 112 mcg, 112 mcg, Oral, Daily, Rosey Rodriguez MD, 112 mcg at 10/23/19 0635    metoprolol tartrate (LOPRESSOR) tablet 25 mg, 25 mg, Oral, Q12H [address], Nadara Curet, PA-C, 25 mg at 10/23/19 3859    nystatin (MYCOSTATIN) powder, , Topical, BID, Rosey Rodriguez MD    pantoprazole (PROTONIX) EC tablet 40 mg, 40 mg, Oral, Early Morning, Levon Franks PA-C, 40 mg at 10/23/19 8115    polyethylene glycol (MIRALAX) packet 17 g, 17 g, Oral, Daily PRN, Rosey Rodriguez MD    sertraline (ZOLOFT) tablet 25 mg, 25 mg, Oral, HS, Adarsh Peguero PA-C, 25 mg at 10/22/19 5403    Laboratory Results:  Results from last 7 days   Lab Units 10/23/19  0451 10/22/19  1242 10/22/19  0458 10/21/19  2200 10/21/19  1853 10/21/19  0533 10/18/19  0513 10/17/19  0443   WBC Thousand/uL 6 50 6 20 6 90  --  11 80*  --  6 25 6 06   HEMOGLOBIN g/dL 8 1* 8 0* 7 9*  --  9 7* 8 8* 8 3* 8 7*   HEMATOCRIT % 24 1* 24 0* 23 4*  --  30 1* 26 4* 26 9* 27 3*   PLATELETS Thousands/uL 202 195 204  --  261  --  193 186   POTASSIUM mmol/L 4 1  --  4 5 4 9 5 2* 4 3 4 1 4 5   CHLORIDE mmol/L 95*  --  94*  --  91* 94* 103 103   CO2 mmol/L 33*  --  32*  --  30 32* 28 30   BUN mg/dL 51*  --  52*  --  48* 46* 37* 33*   CREATININE mg/dL 1 84*  --  1 63*  --  1 90* 1 41* 1 53* 1 50*   CALCIUM mg/dL 8 9  --  8 7  --  9 4 9 1 8 6 8 6   MAGNESIUM mg/dL 2 3  --  2 1  --  2 4*  --   --   --    PHOSPHORUS mg/dL 4 4  --  5 9*  --  7 9* 4 0  --   --

## 2019-10-23 NOTE — RESPIRATORY THERAPY NOTE
Pt was getting transferred to the floors, pt is wasn't given her nebs, asked the pt if she wanted, pt stated she will inform us when she wants it

## 2019-10-23 NOTE — OCCUPATIONAL THERAPY NOTE
Occupational Therapy Treatment Note  12:00-12:38pm     Patient Name: Eri Marie  ABNPW'G Date: 10/23/2019  Problem List  Principal Problem:    Acute on chronic respiratory failure with hypoxia [facility])  Active Problems:    Essential hypertension    Acute on chronic diastolic heart failure (HCC)    Acute-on-chronic kidney injury (HCC)    Anemia    SDH (subdural hematoma) ([facility])    AZUL (obstructive sleep apnea)    Controlled type 2 diabetes mellitus with diabetic neuropathy, without long-term current use of insulin ([facility])    Troponin level elevated    Morbid obesity ([facility] [address] )            10/23/19 1200   Restrictions/Precautions   Weight Bearing Precautions Per Order No   Other Precautions O2;Telemetry; Fall Risk;Cognitive   Pain Assessment   Pain Assessment No/denies pain   ADL   Where Assessed Chair   Grooming Assistance 4  Minimal Assistance   LB Dressing Assistance 2  Maximal Assistance   LB Dressing Deficit Thread LLE into underwear; Thread RLE into underwear;Pull up over hips   Functional Standing Tolerance   Time ~ 1 minute    Activity static stand   Transfers   Sit to Stand 3  Moderate assistance   Additional items Assist x 1; Increased time required;Verbal cues   Stand to Sit 3  Moderate assistance   Additional items Assist x 1; Increased time required;Verbal cues   Cognition   Arousal/Participation Cooperative   Attention Attends with cues to redirect   Orientation Level Oriented to person;Oriented to place   Memory Decreased recall of recent events   Following Commands Follows one step commands without difficulty   Activity Tolerance   Activity Tolerance Patient limited by fatigue   Assessment   Assessment Pt seen for OT txt  Pt pleasant, received in chair  Pt reports fatigue from physical therapy session  Pt completed 3 trials sit-->stand, verbal cues for hand placement and posture, noted improved body mechanics with repeat trials  Pt's O2 ranging 93-95% throughout while on supplemental O2 via NC    Pt continues to require maxA for LB dressing to don underwear  Pt overall demonstrating improved activity tolerance vs initial evaluation, remains limited in functional UE strength, standing tolerance, and dynamic balance  Pt would benefit from continued OT services to further address deficits and increase independence with ADLs  Continue OT POC  Plan   Treatment Interventions ADL retraining;Functional transfer training;UE strengthening/ROM; Endurance training;Continued evaluation; Energy conservation; Activityengagement   Goal Expiration Date 11/05/19   OT Treatment Day 1   OT Frequency 3-5x/wk   Recommendation   OT Discharge Recommendation Short Term Rehab  (acute IP rehab )

## 2019-10-23 NOTE — ASSESSMENT & PLAN NOTE
· Continue home norvasc and metoprolol   · Nitroglycerin gtt titrated off on 10/22/19  · Maintain SBP <160

## 2019-10-23 NOTE — PROGRESS NOTES
Progress Note - Cardiology   Teresa Siemens 80 y o  female MRN: [id number]  Unit/Bed#:  Encounter: [id number]    Assessment:  1  Echocardiographic findings consistent with Takotsubo syndrome although underlying coronary artery disease cannot be ruled out by echocardiography  2  Congestive heart failure improved but not stabilized  3  Non ST elevation myocardial infarction, type 2 (although her presentation of elevated troponin with little serial change in the setting of congestive heart failure suggested that patient did not have a myocardial infarction; however, echocardiographic findings strongly support a non ST elevation myocardial infarction, type 2  If further evaluation reveals coronary artery disease, diagnosis will be changed to a non ST elevation myocardial infarction type 1  4  Echocardiogram performed in August reviewed  Reported as LVEF of 55 percent but appearance is more consistent with 40 percent  There is distal septal hypokinesis extending into the apex  The presence of a wall motion abnormality in August 2019 suggest that present problems represent coronary artery disease and not takotsubo syndrome  Reluctant to recommend cardiac catheterization due to chronic kidney disease, stage III  Presently her GFR is 25 but back in September 2019 it was 38  If renal function improves, minimal coronary angiography might be possible in 6-8 weeks  5   Status post bioprosthetic aortic valve replacement  6  Chronic kidney disease stage 3 with acute exacerbation  7  Chronic anemia  7  Hypertension with history of renal artery stenosis with subsequent stent placement  8  Diabetes mellitus  9  Hyperlipidemia  10  History of cerebral vascular accident  6  Clinical picture is evolving to support acute congestive heart failure and not coexisting pulmonary embolization     Plan:  1  Main stay of treatment remains diuresis  Patient clinically still in heart failure but improved    Defer diuretic management to Nephrology  2  Patient presently clinically not a candidate for carvedilol, Ace or Arb, Entresto  3  Consider nuclear stress test when further stabilized  Interval history:  Patient much improved since yesterday  She is sitting in a chair on room air and is much less tachypneic than yesterday although she still complains of shortness of breath  She denies chest discomfort of any type  Patient's examination today demonstrates significantly more rales than yesterday despite the fact the patient is not in distress as she was yesterday  In bed with abdominal breathing yesterday made examination very difficult compared to today when she was sitting in a chair  Vitals: /50   Pulse 69   Temp 97 8 °F (36 6 °C) (Temporal)   Resp 22   Ht 4' 11" (1 499 m)   Wt 82 8 kg (182 lb 8 7 oz)   SpO2 100%   BMI 36 87 kg/m²   Vitals:    10/22/19 0400 10/23/19 0400   Weight: 83 5 kg (184 lb 1 4 oz) 82 8 kg (182 lb 8 7 oz)     Orthostatic Blood Pressures      Most Recent Value   Blood Pressure  116/50 filed at 10/23/2019 2234   Patient Position - Orthostatic VS  Lying filed at 10/23/2019 0400            Intake/Output Summary (Last 24 hours) at 10/23/2019 1002  Last data filed at 10/23/2019 0931  Gross per 24 hour   Intake 660 ml   Output 1975 ml   Net -1315 ml       Invasive Devices     Peripherally Inserted Central Catheter Line            PICC Line 10/22/19 Left Brachial less than 1 day          Drain            Urethral Catheter Latex 16 Fr  1 day                Review of Systems   Respiratory: Positive for shortness of breath  Negative for cough, choking, chest tightness and wheezing  Cardiovascular: Negative for chest pain, palpitations and leg swelling  Musculoskeletal: Negative for gait problem  Skin: Negative for rash  Neurological: Negative for dizziness, tremors, syncope, weakness, light-headedness, numbness and headaches     Psychiatric/Behavioral: Negative for agitation and behavioral problems  The patient is not hyperactive  Physical Exam   Constitutional: She is oriented to person, place, and time  She appears well-developed and well-nourished  No distress  HENT:   Head: Normocephalic and atraumatic  Neck: No JVD present  No thyromegaly present  Cardiovascular: Normal rate, regular rhythm, normal heart sounds and intact distal pulses  Exam reveals no gallop and no friction rub  No murmur heard  Pulmonary/Chest: No respiratory distress  She has no wheezes  She has rales  Bilateral fine rales 1/2 way up on both lung fields  Patient is not in respiratory distress as she was yesterday  Dullness in both bases may be secondary to bilateral small pleural effusions   Musculoskeletal: She exhibits no edema  Neurological: She is alert and oriented to person, place, and time  Skin: Skin is warm and dry  Psychiatric: She has a normal mood and affect  Her behavior is normal        Lab Results:   CBC with diff:   Results from last 7 days   Lab Units 10/23/19  0451   WBC Thousand/uL 6 50   RBC Million/uL 2 45*   HEMOGLOBIN g/dL 8 1*   HEMATOCRIT % 24 1*   MCV fL 98   MCH pg 33 1   MCHC g/dL 33 6   RDW % 15 1   MPV fL 7 2*   PLATELETS Thousands/uL 202     CMP:   Results from last 7 days   Lab Units 10/23/19  0451 10/22/19  0458   SODIUM mmol/L 136* 133*   POTASSIUM mmol/L 4 1 4 5   CHLORIDE mmol/L 95* 94*   CO2 mmol/L 33* 32*   BUN mg/dL 51* 52*   CREATININE mg/dL 1 84* 1 63*   CALCIUM mg/dL 8 9 8 7   AST U/L  --  50*   ALT U/L  --  32   ALK PHOS U/L  --  72   EGFR ml/min/1 73sq m 25* 29*     Troponin:   0   Lab Value Date/Time    TROPONINI 2 85 (H) 10/22/2019 0059    TROPONINI 3 03 (H) 10/21/2019 2200    TROPONINI 3 24 (H) 10/21/2019 1853    TROPONINI 1 22 (H) 08/16/2019 0721    TROPONINI 1 24 (H) 08/16/2019 0439    TROPONINI 0 99 (H) 08/16/2019 0053       Imaging: I have personally reviewed pertinent reports

## 2019-10-23 NOTE — ASSESSMENT & PLAN NOTE
· Baseline hemoglobin appears to be 9-10  · No overt s/s of bleeding -- continue to monitor  · Trend on serial hemoglobins  · Plan for transfusion if hemoglobin falls less than 7  · T&S sent

## 2019-10-23 NOTE — NURSING NOTE
Pt received this pm from ICU into 712-2, pt a+o, denies pain, lungs with wheezing, abd soft with bs, +PP + edema BLE, will monitor, no SOB   Son at bedside

## 2019-10-23 NOTE — ASSESSMENT & PLAN NOTE
· Troponin peak 3 24 -- stop trend   · No avert ischemia noted   · All ASA, plavix, heparin being held due to recent SDH

## 2019-10-23 NOTE — PROGRESS NOTES
Transfer Note - ICU/Stepdown Transfer to [facility]/MS tele   Rand Bejarano 80 y o  female MRN: [id number]  [facility]   Unit/Bed#:  Encounter: [id number]    Code Status: Level 1 - Full Code    Reason for ICU/Stepdown admission: transfer from rehab for acute hypoxic respiratory failure     Active problems:     * Acute on chronic respiratory failure with hypoxia ([address] )  Assessment & Plan  · Transferred to the ICU 10/21 from rehab after notably increased work of breathing, shortness of breath, hypoxia, conversational dyspnea, notably decreased lung sounds throughout bilaterally  · ABG -- pH 7 1, CO2 81, pO2 156, HCO3 27 6, BE -3 1-- improved ABG recheck at 2100 to pH 7 34, CO2 55  · Placed on BiPAP with mild improvement in work of breathing  · On chart review, this appears to be likely related to her acute on chronic diastolic heart failure, which appears to be worse than baseline  · BNP over 26,000 on admission   · Stat portable CXR showed some pulmonary vascular congestion   · Lasix decreased to 80 mg IV BID - managed by nephrology   · Monitor I&O's -- Fluid goal for 24 hours at -1 L  · Weaned from BiPAP at 0800 10/22/19 and remained on NC throughout the day with satisfactory O2 sats   · Continue BiPAP HS and PRN for increased WOB - will leave bipap off overnight with an overnight pulse ox study on 2 L NC, repeat ABG in am to determine patient need for home bipap  · Pulmonary toileting as able  · Stat blood cultures negative, strep/legionella pending negative   · Flu negative   · Solu-Medrol 125 mg IV x1 given  · Scheduled Xopenex, Atrovent  · All abx discontinued 10/22/19  · Overall improved from admission     Acute on chronic diastolic heart failure (HCC)  Assessment & Plan  Wt Readings from Last 3 Encounters:   10/23/19 82 8 kg (182 lb 8 7 oz)   10/21/19 87 5 kg (192 lb 14 4 oz)   10/17/19 86 2 kg (190 lb 0 6 oz)         · Grade 2 diastolic heart failure noted on previous echo in August 2019  Repeat echo 10/22 -- Moderate left ventricular systolic dysfunction, EF 01%  Severe hypokinesis to akinesis in the distal half of the anterior, lateral and inferior walls with akinesis of the distal 3rd of the septum  The apex is akinetic to paradoxical  The base of the heart contracts vigorously -- findings could be consistent with Takotsubo's syndrome; Grade 2 left ventricular diastolic dysfunction (pseudonormalization)   Moderately elevated left ventricular filling pressures; mod to severe MR; mild to mod pulmonary HTN  · Current acute on chronic respiratory failure necessitating transfer to ICU and placed on BiPAP likely attributable to worsening of this chronic diastolic heart failure -- this appears to be improving as she has been liberated from constant respiratory support with BiPAP and has had improved UO, making her net negative overall  · Lasix decreased to 80 mg IV BID    Troponin level elevated  Assessment & Plan  · Troponin peak 3 24 -- stop trend   · No avert ischemia noted   · All ASA, plavix, heparin being held due to recent SDH    Acute-on-chronic kidney injury ([facility] [address] )  Assessment & Plan  · Noted on chart review to have chronic renal insufficiency with creatinine baseline appearing to be about 1 5-1 6  · Heller in place for accurate I&O's   · Nephrology following, who agrees with scheduled Lasix, and who orders Renal US -- pending   · Avoid nephrotoxic agents  · Trend renal indices on serial BMPs  · Creat improving to 1 6 -- trend     Anemia  Assessment & Plan  · Baseline hemoglobin appears to be 9-10  · No overt s/s of bleeding -- continue to monitor  · Trend on serial hemoglobins  · Plan for transfusion if hemoglobin falls less than 7  · T&S sent     Controlled type 2 diabetes mellitus with diabetic neuropathy, without long-term current use of insulin [facility])  Assessment & Plan  Lab Results   Component Value Date    HGBA1C 5 0 08/16/2019       Recent Labs     10/22/19  1601 10/22/19  7790 10/23/19  0450 10/23/19  1129   POCGLU 136 133 111 262*       Blood Sugar Average: Last 72 hrs:   (P) 129 5     · HbA1c on 08/16/2019 -- 5  · SSI AC/HS with CHO controlled meals to maintain glucose 140-180    AZUL (obstructive sleep apnea)  Assessment & Plan  · Previous chart review notes that she has been noncompliant with CPAP at night for past 10-15 years  · Continue BiPAP HS and PRN for increased WOB    SDH (subdural hematoma) (HCC)  Assessment & Plan  · Stat CT head -- Minimal residual right parafalcine hemorrhage in the inferior aspect of the falx  · Serial neuro exams  · Continuing to hold ASA, plavix   · DVT ppx with Heparin SQ initiated 10/22/19    Essential hypertension  Assessment & Plan  · Continue home norvasc and metoprolol   · Nitroglycerin gtt titrated off on 10/22/19  · Maintain SBP <160    Morbid obesity ([address] )  Assessment & Plan  · Noted         Consultants:   · Cardiology, nephrology     History of Present Illness/Summary of clinical course:  Goldie Martinez is an 30-year-old female with a past history chronic kidney disease, hypertension, hypothyroidism, CVA, chronic diastolic heart failure, status post aortic valve replacement, anemia, AZUL, hyperlipidemia, diabetes type 2, any recent subdural hematoma presented to ICU on 10/21/2019 from rehab  She initially was being treated for subdural hematoma status post fall at [facility] and was transferred to the acute rehab center [facility] on 10/19/2019  During the day on 10/21/2019 the patient had progressive worsening shortness of breath resulting in acute respiratory failure with hypoxia  Her initial blood gas showed pH 7 1, CO2 81, PO2 156, bicarb 27 6  He was placed on BiPAP and was given IV Lasix  Initially she required a nitroglycerin drip for systolic blood pressure greater than 200 however that was shortly discontinued a few hours later  She received IV Solu-Medrol and has been continued on diuresis    Repeat echo worse from August 2019 showing EF 41% with severe hypokinesis, severe mitral regurgitation, grade 2 left ventricular diastolic failure, and mild-to-moderate pulmonary hypertension  The patient continues to get IV Lasix and is being followed by Nephrology  Plan for the evening of [phone] is to have the patient receive a continuous pulse oximetry study on 2 L nasal cannula see if she qualifies for home BiPAP  Patient will be transferred to [facility] with no telemetry required do continue to encourage ambulation and set up discharge needs prior to returning to [facility]  Please refer to today's progress note for further clinical details  Recent or scheduled procedures: 10/21 R femoral triple-lumen catheter placed  10/22 BLE venous duplex negative  10/22 echo EF 41% with severe hypokinesis, severe mitral regurgitation, grade 2 left ventricular diastolic failure, and mild to moderate pulmonary hypertension  10/22 left upper extremity PICC line placed  10/22 right femoral triple-lumen catheter discontinued    Outstanding/pending diagnostics: overnight pulse oximetry study on 2 L NC for bipap qualification       Mobilization Plan: OOB to chair, ambulate with walker    Nutrition Plan: 1200 ml fluid restriction, CCM diet    Discharge Plan: Patient should be ready for discharge to go after 10/24/19        [  ] Family aware of transfer out of critical care: yes       Spoke with Dr Ning Weeks regarding transfer @ 21 [phone]  Patient accepted to their service      [address]

## 2019-10-23 NOTE — RESPIRATORY THERAPY NOTE
RT Protocol Note  Steff Ojeda 80 y o  female MRN: [id number]  Unit/Bed#:  Encounter: [id number]    Assessment    Principal Problem:    Acute on chronic respiratory failure with hypoxia [facility])  Active Problems:    Essential hypertension    Acute on chronic diastolic heart failure (HCC)    Acute-on-chronic kidney injury (HCC)    Anemia    SDH (subdural hematoma) ([facility])    AZUL (obstructive sleep apnea)    Controlled type 2 diabetes mellitus with diabetic neuropathy, without long-term current use of insulin ([facility])    Troponin level elevated    Morbid obesity ([facility] )      Home Pulmonary Medications:    Home Devices/Therapy: Other (Comment)(ventolin hfa prn)    Past Medical History:   Diagnosis Date    Arthritis     Breast cancer ([place] [address] ) 2015    Cardiac disease     aortic valve transplant    CHF (congestive heart failure) ([facility])     Compression fracture of body of thoracic vertebra ([place] [address] )     CVA (cerebral vascular accident) ([place] [address] )     Diabetes mellitus ([place] [address] )     Disease of thyroid gland     Fibromyalgia, primary     H/O cervical fracture 01/09/2019    Hyperlipidemia     Hypertension     Neuropathy     AZUL (obstructive sleep apnea) 10/19/2019    Pressure injury of skin     Renal disorder     kidney stent    Stroke [facility])      Social History     Socioeconomic History    Marital status:       Spouse name: Not on file    Number of children: 3    Years of education: Not on file    Highest education level: Not on file   Occupational History    Not on file   Social Needs    Financial resource strain: Not on file    Food insecurity:     Worry: Not on file     Inability: Not on file    Transportation needs:     Medical: Not on file     Non-medical: Not on file   Tobacco Use    Smoking status: Never Smoker    Smokeless tobacco: Never Used   Substance and Sexual Activity    Alcohol use: Never     Frequency: Never     Binge frequency: Never    Drug use: No    Sexual activity: Not Currently Lifestyle    Physical activity:     Days per week: Not on file     Minutes per session: Not on file    Stress: Not on file   Relationships    Social connections:     Talks on phone: Not on file     Gets together: Not on file     Attends Faith service: Not on file     Active member of club or organization: Not on file     Attends meetings of clubs or organizations: Not on file     Relationship status: Not on file    Intimate partner violence:     Fear of current or ex partner: Not on file     Emotionally abused: Not on file     Physically abused: Not on file     Forced sexual activity: Not on file   Other Topics Concern    Not on file   Social History Narrative    ** Merged History Encounter **            Subjective    Subjective Data: Florian matias    Objective    Physical Exam:   Assessment Type: Pre-treatment  General Appearance: Alert, Awake  Respiratory Pattern: Normal  Chest Assessment: Chest expansion symmetrical, Trachea midline  Bilateral Breath Sounds: Expiratory wheezes, Coarse  Location Specific: No  Cough: None  O2 Device: 2LPM/NC    Vitals:  Blood pressure 116/50, pulse 69, temperature 97 8 °F (36 6 °C), temperature source Temporal, resp  rate 22, height 4' 11" (1 499 m), weight 82 8 kg (182 lb 8 7 oz), SpO2 100 %, not currently breastfeeding  Results from last 7 days   Lab Units 10/21/19  2103   PH ART  7 340*   PCO2 ART mm Hg 55 0*   PO2 ART mm Hg 127 0*   HCO3 ART mmol/L 29 7*   BASE EXC ART mmol/L 2 8*   O2 CONTENT ART mL/dL 16 9   O2 HGB, ARTERIAL % 95 5   ABG SOURCE  Radial, Right   CHUCK TEST  Yes   NON VENT ROOM AIR % 40       Imaging and other studies: I have personally reviewed pertinent reports        O2 Device: 2LPM/NC     Plan    Respiratory Plan: Mild Distress pathway        Resp Comments: Pt was on bipap until 4am pt iwas comfertable with no respiratory distress

## 2019-10-23 NOTE — PLAN OF CARE
Problem: PHYSICAL THERAPY ADULT  Goal: Performs mobility at highest level of function for planned discharge setting  See evaluation for individualized goals  Description  Treatment/Interventions: Functional transfer training, LE strengthening/ROM, Therapeutic exercise, Endurance training, Cognitive reorientation, Patient/family training, Equipment eval/education, Bed mobility, Gait training, Compensatory technique education, Spoke to nursing, OT  Equipment Recommended: (TBD in rehab)       See flowsheet documentation for full assessment, interventions and recommendations  Outcome: Progressing  Note:   Prognosis: Good  Problem List: Decreased strength, Decreased range of motion, Decreased endurance, Impaired balance, Decreased mobility, Decreased coordination, Obesity, Pain  Assessment: Cardiology note appreciated  Pt OOb in chair  Nursing in during session to take patient of telemonitor and remove gomez catheter  Pt on 2 LPM NC  Pt agreeable for treatment  Mod assist initially for sit to stand transfer and mod assist for balance to attain upright posture with RW for UE support however min assist on additional trials  Pt with decreased weight shift laterally to offload either extremity  Therapist has to assist with lateral weight shift to allow offload  Pt with decreased clearance  She is able to take 8 steps forward and backward x 3 with RW  Vitals at rest seated /47, HR 63 bpm and following session with upright activity /57, HR 64 bpm   Pt note to have some SOB following activity  SpO2 100% on 2 LPM  Pt required mod assist to position back in chair  Continue to follow and progress ambulation next visit  Will need longer O2 tubing to advance distance > 4 feet  Barriers to Discharge: Inaccessible home environment, Decreased caregiver support     Recommendation: Post acute IP rehab     PT - OK to Discharge: Yes    See flowsheet documentation for full assessment

## 2019-10-23 NOTE — NURSING NOTE
Report phoned to 7T RN, Mary Allen  Pt assisted to W/c, escorted by transport to 711[address]2  Son with patient at time of transfer

## 2019-10-23 NOTE — ASSESSMENT & PLAN NOTE
Wt Readings from Last 3 Encounters:   10/22/19 83 5 kg (184 lb 1 4 oz)   10/21/19 87 5 kg (192 lb 14 4 oz)   10/17/19 86 2 kg (190 lb 0 6 oz)         · Grade 2 diastolic heart failure noted on previous echo in August 2019  Repeat echo 10/22 -- Moderate left ventricular systolic dysfunction, EF 48%  Severe hypokinesis to akinesis in the distal half of the anterior, lateral and inferior walls with akinesis of the distal 3rd of the septum  The apex is akinetic to paradoxical  The base of the heart contracts vigorously -- findings could be consistent with Takotsubo's syndrome; Grade 2 left ventricular diastolic dysfunction (pseudonormalization)   Moderately elevated left ventricular filling pressures; mod to severe MR; mild to mod pulmonary HTN  · Current acute on chronic respiratory failure necessitating transfer to ICU and placed on BiPAP likely attributable to worsening of this chronic diastolic heart failure -- this appears to be improving as she has been liberated from constant respiratory support with BiPAP and has had improved UO, making her net negative overall  · Lasix IV 80mg Q8H scheduled  · Plan on monitoring I&O's closely with 24 hour fluid goal of net -1 L  · Pina remains present for accurate I&O's

## 2019-10-23 NOTE — NURSING NOTE
Pt is obtunded and difficult to arouse  Responds to sternal rub and follows commands but fall back to sleep immediately  Vital signs stable with good SaO2  Somewhat confused to time but otherwise oriented  Pt is know to have slept very poorly for several days  Denies pain  Heart tones are clear with palpable pulses and +1 lower extremity edema  Color is pale with areas of dark ecchymosis  Skin is warm and dry  Lungs are very decreased with coarse crackles and scattered wheezes  Mildly labored at rest   Tolerating 2 liters via nasal cannula  Abdomen is large and soft  No BM  Urinary catheter draining clear yellow with small amount of sediment  PICC line intact with old drainage under dressing  Will monitor  Flushes well with good blood return  Right groin site dressing is moist but with no active drainage

## 2019-10-24 ENCOUNTER — APPOINTMENT (INPATIENT)
Dept: NON INVASIVE DIAGNOSTICS | Facility: HOSPITAL | Age: 84
DRG: 280 | End: 2019-10-24
Payer: COMMERCIAL

## 2019-10-24 LAB
ANION GAP SERPL CALCULATED.3IONS-SCNC: 6 MMOL/L (ref 5–14)
ARTERIAL PATENCY WRIST A: YES
BASE EXCESS BLDA CALC-SCNC: 10.6 MMOL/L (ref -2.1–2.1)
BUN SERPL-MCNC: 52 MG/DL (ref 5–25)
CALCIUM SERPL-MCNC: 8.8 MG/DL (ref 8.4–10.2)
CHLORIDE SERPL-SCNC: 96 MMOL/L (ref 97–108)
CO2 SERPL-SCNC: 34 MMOL/L (ref 22–30)
CREAT SERPL-MCNC: 1.78 MG/DL (ref 0.6–1.2)
GFR SERPL CREATININE-BSD FRML MDRD: 26 ML/MIN/1.73SQ M
GLUCOSE SERPL-MCNC: 108 MG/DL (ref 70–99)
GLUCOSE SERPL-MCNC: 127 MG/DL (ref 65–140)
GLUCOSE SERPL-MCNC: 144 MG/DL (ref 65–140)
GLUCOSE SERPL-MCNC: 163 MG/DL (ref 65–140)
GLUCOSE SERPL-MCNC: 221 MG/DL (ref 65–140)
HCO3 BLDA-SCNC: 36 MMOL/L (ref 22–26)
NASAL CANNULA: 2
O2 CT BLDA-SCNC: 10.9 ML/DL (ref 16–23)
OXYHGB MFR BLDA: 95.3 % (ref 95–98)
PCO2 BLDA: 53 MM HG (ref 35–45)
PH BLDA: 7.44 [PH] (ref 7.35–7.45)
PO2 BLDA: 85 MM HG (ref 80–105)
POTASSIUM SERPL-SCNC: 3.7 MMOL/L (ref 3.6–5)
SODIUM SERPL-SCNC: 136 MMOL/L (ref 137–147)
SPECIMEN SOURCE: ABNORMAL

## 2019-10-24 PROCEDURE — 97535 SELF CARE MNGMENT TRAINING: CPT | Performed by: STUDENT IN AN ORGANIZED HEALTH CARE EDUCATION/TRAINING PROGRAM

## 2019-10-24 PROCEDURE — 94760 N-INVAS EAR/PLS OXIMETRY 1: CPT

## 2019-10-24 PROCEDURE — 94762 N-INVAS EAR/PLS OXIMTRY CONT: CPT

## 2019-10-24 PROCEDURE — 99233 SBSQ HOSP IP/OBS HIGH 50: CPT | Performed by: INTERNAL MEDICINE

## 2019-10-24 PROCEDURE — 97116 GAIT TRAINING THERAPY: CPT

## 2019-10-24 PROCEDURE — 36600 WITHDRAWAL OF ARTERIAL BLOOD: CPT

## 2019-10-24 PROCEDURE — 82948 REAGENT STRIP/BLOOD GLUCOSE: CPT

## 2019-10-24 PROCEDURE — 97530 THERAPEUTIC ACTIVITIES: CPT | Performed by: STUDENT IN AN ORGANIZED HEALTH CARE EDUCATION/TRAINING PROGRAM

## 2019-10-24 PROCEDURE — 97530 THERAPEUTIC ACTIVITIES: CPT

## 2019-10-24 PROCEDURE — 94660 CPAP INITIATION&MGMT: CPT

## 2019-10-24 PROCEDURE — 80048 BASIC METABOLIC PNL TOTAL CA: CPT | Performed by: NURSE PRACTITIONER

## 2019-10-24 PROCEDURE — 99233 SBSQ HOSP IP/OBS HIGH 50: CPT | Performed by: FAMILY MEDICINE

## 2019-10-24 PROCEDURE — 97110 THERAPEUTIC EXERCISES: CPT

## 2019-10-24 PROCEDURE — 94640 AIRWAY INHALATION TREATMENT: CPT

## 2019-10-24 PROCEDURE — 82805 BLOOD GASES W/O2 SATURATION: CPT | Performed by: NURSE PRACTITIONER

## 2019-10-24 RX ORDER — HYDROCODONE BITARTRATE AND ACETAMINOPHEN 5; 325 MG/1; MG/1
1 TABLET ORAL EVERY 6 HOURS PRN
Status: CANCELLED | OUTPATIENT
Start: 2019-10-24

## 2019-10-24 RX ORDER — LEVOTHYROXINE SODIUM 112 UG/1
112 TABLET ORAL DAILY
Status: CANCELLED | OUTPATIENT
Start: 2019-10-25

## 2019-10-24 RX ORDER — METHOCARBAMOL 500 MG/1
500 TABLET, FILM COATED ORAL EVERY 6 HOURS PRN
Status: DISCONTINUED | OUTPATIENT
Start: 2019-10-24 | End: 2019-10-28 | Stop reason: HOSPADM

## 2019-10-24 RX ORDER — HYDROCODONE BITARTRATE AND ACETAMINOPHEN 5; 325 MG/1; MG/1
2 TABLET ORAL EVERY 6 HOURS PRN
Status: CANCELLED | OUTPATIENT
Start: 2019-10-24

## 2019-10-24 RX ORDER — ACETAMINOPHEN 325 MG/1
650 TABLET ORAL EVERY 6 HOURS PRN
Status: CANCELLED | OUTPATIENT
Start: 2019-10-24

## 2019-10-24 RX ORDER — POLYETHYLENE GLYCOL 3350 17 G/17G
17 POWDER, FOR SOLUTION ORAL DAILY PRN
Status: CANCELLED | OUTPATIENT
Start: 2019-10-24

## 2019-10-24 RX ORDER — HEPARIN SODIUM 5000 [USP'U]/ML
5000 INJECTION, SOLUTION INTRAVENOUS; SUBCUTANEOUS EVERY 8 HOURS SCHEDULED
Status: CANCELLED | OUTPATIENT
Start: 2019-10-24

## 2019-10-24 RX ORDER — AMLODIPINE BESYLATE 2.5 MG/1
2.5 TABLET ORAL DAILY
Status: CANCELLED | OUTPATIENT
Start: 2019-10-25

## 2019-10-24 RX ORDER — NYSTATIN 100000 [USP'U]/G
POWDER TOPICAL 2 TIMES DAILY
Status: CANCELLED | OUTPATIENT
Start: 2019-10-24

## 2019-10-24 RX ORDER — LEVALBUTEROL 1.25 MG/.5ML
1.25 SOLUTION, CONCENTRATE RESPIRATORY (INHALATION)
Status: CANCELLED | OUTPATIENT
Start: 2019-10-24

## 2019-10-24 RX ORDER — METOLAZONE 2.5 MG/1
5 TABLET ORAL ONCE
Status: COMPLETED | OUTPATIENT
Start: 2019-10-24 | End: 2019-10-24

## 2019-10-24 RX ORDER — PANTOPRAZOLE SODIUM 40 MG/1
40 TABLET, DELAYED RELEASE ORAL
Status: CANCELLED | OUTPATIENT
Start: 2019-10-25

## 2019-10-24 RX ORDER — GABAPENTIN 300 MG/1
300 CAPSULE ORAL 2 TIMES DAILY
Status: CANCELLED | OUTPATIENT
Start: 2019-10-24

## 2019-10-24 RX ORDER — FUROSEMIDE 40 MG/1
80 TABLET ORAL
Status: CANCELLED | OUTPATIENT
Start: 2019-10-24

## 2019-10-24 RX ORDER — POTASSIUM CHLORIDE 20 MEQ/1
20 TABLET, EXTENDED RELEASE ORAL ONCE
Status: COMPLETED | OUTPATIENT
Start: 2019-10-24 | End: 2019-10-24

## 2019-10-24 RX ADMIN — INSULIN LISPRO 1 UNITS: 100 INJECTION, SOLUTION INTRAVENOUS; SUBCUTANEOUS at 11:22

## 2019-10-24 RX ADMIN — NYSTATIN: 100000 POWDER TOPICAL at 09:57

## 2019-10-24 RX ADMIN — METOPROLOL TARTRATE 25 MG: 25 TABLET, FILM COATED ORAL at 21:38

## 2019-10-24 RX ADMIN — METOLAZONE 5 MG: 2.5 TABLET ORAL at 17:06

## 2019-10-24 RX ADMIN — FUROSEMIDE 80 MG: 10 INJECTION, SOLUTION INTRAVENOUS at 16:48

## 2019-10-24 RX ADMIN — LEVALBUTEROL HYDROCHLORIDE 1.25 MG: 1.25 SOLUTION, CONCENTRATE RESPIRATORY (INHALATION) at 02:23

## 2019-10-24 RX ADMIN — IPRATROPIUM BROMIDE 0.5 MG: 0.5 SOLUTION RESPIRATORY (INHALATION) at 02:23

## 2019-10-24 RX ADMIN — LEVALBUTEROL HYDROCHLORIDE 1.25 MG: 1.25 SOLUTION, CONCENTRATE RESPIRATORY (INHALATION) at 09:26

## 2019-10-24 RX ADMIN — IPRATROPIUM BROMIDE 0.5 MG: 0.5 SOLUTION RESPIRATORY (INHALATION) at 14:49

## 2019-10-24 RX ADMIN — LEVALBUTEROL HYDROCHLORIDE 1.25 MG: 1.25 SOLUTION, CONCENTRATE RESPIRATORY (INHALATION) at 14:49

## 2019-10-24 RX ADMIN — METOPROLOL TARTRATE 25 MG: 25 TABLET, FILM COATED ORAL at 09:55

## 2019-10-24 RX ADMIN — HEPARIN SODIUM 5000 UNITS: 5000 INJECTION INTRAVENOUS; SUBCUTANEOUS at 14:20

## 2019-10-24 RX ADMIN — METHOCARBAMOL 500 MG: 500 TABLET, FILM COATED ORAL at 17:02

## 2019-10-24 RX ADMIN — SERTRALINE HYDROCHLORIDE 25 MG: 50 TABLET ORAL at 21:37

## 2019-10-24 RX ADMIN — GABAPENTIN 300 MG: 300 CAPSULE ORAL at 09:54

## 2019-10-24 RX ADMIN — NYSTATIN: 100000 POWDER TOPICAL at 17:00

## 2019-10-24 RX ADMIN — IPRATROPIUM BROMIDE 0.5 MG: 0.5 SOLUTION RESPIRATORY (INHALATION) at 19:53

## 2019-10-24 RX ADMIN — ACETAMINOPHEN 650 MG: 325 TABLET ORAL at 11:23

## 2019-10-24 RX ADMIN — FUROSEMIDE 80 MG: 10 INJECTION, SOLUTION INTRAVENOUS at 09:56

## 2019-10-24 RX ADMIN — GABAPENTIN 300 MG: 300 CAPSULE ORAL at 17:00

## 2019-10-24 RX ADMIN — LEVALBUTEROL HYDROCHLORIDE 1.25 MG: 1.25 SOLUTION, CONCENTRATE RESPIRATORY (INHALATION) at 19:53

## 2019-10-24 RX ADMIN — HEPARIN SODIUM 5000 UNITS: 5000 INJECTION INTRAVENOUS; SUBCUTANEOUS at 21:50

## 2019-10-24 RX ADMIN — AMLODIPINE BESYLATE 2.5 MG: 2.5 TABLET ORAL at 09:54

## 2019-10-24 RX ADMIN — IPRATROPIUM BROMIDE 0.5 MG: 0.5 SOLUTION RESPIRATORY (INHALATION) at 09:23

## 2019-10-24 RX ADMIN — POTASSIUM CHLORIDE 20 MEQ: 20 TABLET, EXTENDED RELEASE ORAL at 17:05

## 2019-10-24 RX ADMIN — HEPARIN SODIUM 5000 UNITS: 5000 INJECTION INTRAVENOUS; SUBCUTANEOUS at 05:18

## 2019-10-24 NOTE — NURSING NOTE
Patient sleeping well on rounds at this time  No respiratory distress noted  No complaints of pain  SCDs in place  Patient NPO past midnight  Call bell in reach, will monitor

## 2019-10-24 NOTE — ASSESSMENT & PLAN NOTE
Wt Readings from Last 3 Encounters:   10/24/19 83 8 kg (184 lb 11 9 oz)   10/21/19 87 5 kg (192 lb 14 4 oz)   10/17/19 86 2 kg (190 lb 0 6 oz)       Patient is diuresing well    Will continue Lasix IV today and then transition to oral Lasix 80 mg twice daily tomorrow  Continue to monitor electrolytes and daily weights

## 2019-10-24 NOTE — ASSESSMENT & PLAN NOTE
Patient's renal function is between 1 7-1 9  Avoid nephrotoxin agents  Avoid hypotension  Monitor electrolytes and renal function during diuresis  Nephrology once renal ultrasound with Dopplers to be done  Will treat to schedule it will she is at [address] catheter    Monitor voiding

## 2019-10-24 NOTE — NURSING NOTE
Patient has remained NPO since midnight  Changed for a large amount of urine x2 overnight  Denies needs at this time  Call bell in reach, will monitor

## 2019-10-24 NOTE — PLAN OF CARE
Problem: OCCUPATIONAL THERAPY ADULT  Goal: Performs self-care activities at highest level of function for planned discharge setting  See evaluation for individualized goals  Description  Treatment Interventions: ADL retraining, Functional transfer training, UE strengthening/ROM, Endurance training, Cognitive reorientation, Continued evaluation          See flowsheet documentation for full assessment, interventions and recommendations  Outcome: Progressing  Note:   Limitation: Decreased ADL status, Decreased UE ROM, Decreased UE strength, Decreased Safe judgement during ADL, Decreased cognition, Decreased endurance, Decreased high-level ADLs  Prognosis: Good  Assessment: Pt participates in OT session with focus on toileting, transfers, LB dressing, and activity tolerance to increase I for d/c  Pt min A sit to stand with RW support  Pt CGA SPT from chair to commode  Pt min A toileting for bladder management and requires steadying during stance  Pt takes extra time to complete toileting activity this session  Pt max A LB dressing to don/doff depends and pt only able to pull up depends around hips  Pt will continue to benefit from activity tolerance, adls, and transfers       OT Discharge Recommendation: Short Term Rehab(acute IP rehab)

## 2019-10-24 NOTE — PHYSICAL THERAPY NOTE
45 minute treatment       10/24/19 7266   Pain Assessment   Pain Assessment 0-10   Pain Score   (7-8/10)   Pain Type Chronic pain   Pain Location Neck   Pain Orientation Posterior   Restrictions/Precautions   Weight Bearing Precautions Per Order No   General   Chart Reviewed Yes   Response to Previous Treatment Patient with no complaints from previous session  Cognition   Following Commands Follows one step commands without difficulty   Subjective   Subjective Pt agreeable toPT   Bed Mobility   Rolling L 3  Moderate assistance   Additional items Assist x 1; Increased time required   Supine to Sit 3  Moderate assistance   Additional items Assist x 1; Increased time required   Balance   Ambulatory Fair -   Endurance Deficit   Endurance Deficit Yes   Activity Tolerance   Activity Tolerance Patient limited by fatigue   Exercises   Hip Flexion Sitting;20 reps;AROM   Knee AROM Long Arc Quad Sitting;20 reps;AROM; Bilateral   Ankle Pumps Sitting;20 reps;AROM; Bilateral   Assessment   Prognosis Good   Problem List Decreased strength;Decreased range of motion;Decreased endurance; Impaired balance;Decreased mobility; Decreased coordination;Obesity;Pain   Goals   Patient Goals get stronger   STG Expiration Date 10/29/19   Plan   Treatment/Interventions   (COntinue per plan of care)   Progress Progressing toward goals   Recommendation   Recommendation Post acute IP rehab   Lucian Mendez PTA

## 2019-10-24 NOTE — ASSESSMENT & PLAN NOTE
Lab Results   Component Value Date    HGBA1C 5 0 08/16/2019       Recent Labs     10/23/19  1536 10/23/19  2025 10/24/19  0538 10/24/19  1058   POCGLU 155* 163* 163* 221*       Blood Sugar Average: Last 72 hrs:  (P) 395 [id number]   Patient's blood sugars are well controlled on current insulin regimen

## 2019-10-24 NOTE — PLAN OF CARE
Problem: OCCUPATIONAL THERAPY ADULT  Goal: Performs self-care activities at highest level of function for planned discharge setting  See evaluation for individualized goals  Description  Treatment Interventions: ADL retraining, Functional transfer training, UE strengthening/ROM, Endurance training, Cognitive reorientation, Continued evaluation          See flowsheet documentation for full assessment, interventions and recommendations  10/24/2019 1125 by SHAWN Benson  Outcome: Progressing  Note:   Limitation: Decreased ADL status, Decreased UE ROM, Decreased UE strength, Decreased Safe judgement during ADL, Decreased cognition, Decreased endurance, Decreased high-level ADLs  Prognosis: Good  Assessment: Pt participates in OT session with focus on toileting, transfers, LB dressing, and activity tolerance to increase I for d/c  Pt min A sit to stand with RW support  Pt CGA SPT from chair to commode  Pt min A toileting for bladder management and requires steadying during stance  Pt takes extra time to complete toileting activity this session  Pt max A LB dressing to don/doff depends and pt only able to pull up depends around hips  Pt will continue to benefit from activity tolerance, adls, and transfers  OT Discharge Recommendation: Short Term Rehab(acute IP rehab)       10/24/2019 1118 by SHAWN Benson  Outcome: Progressing  Note:   Limitation: Decreased ADL status, Decreased UE ROM, Decreased UE strength, Decreased Safe judgement during ADL, Decreased cognition, Decreased endurance, Decreased high-level ADLs  Prognosis: Good  Assessment: Pt participates in OT session with focus on toileting, transfers, LB dressing, and activity tolerance to increase I for d/c  Pt min A sit to stand with RW support  Pt CGA SPT from chair to commode  Pt min A toileting for bladder management and requires steadying during stance  Pt takes extra time to complete toileting activity this session   Pt max A LB dressing to don/doff depends and pt only able to pull up depends around hips  Pt will continue to benefit from activity tolerance, adls, and transfers       OT Discharge Recommendation: Short Term Rehab(acute IP rehab)

## 2019-10-24 NOTE — ASSESSMENT & PLAN NOTE
Secondary to demand ischemia due to acute on chronic diastolic CHF exacerbation    Continue medical management

## 2019-10-24 NOTE — PHYSICAL THERAPY NOTE
45 minute treatment       10/24/19 2585   Pain Assessment   Pain Assessment 0-10   Pain Score   (7-8/10)   Pain Type Chronic pain   Pain Location Neck   Pain Orientation Posterior   Restrictions/Precautions   Weight Bearing Precautions Per Order No   General   Chart Reviewed Yes   Response to Previous Treatment Patient with no complaints from previous session  Cognition   Following Commands Follows one step commands without difficulty   Subjective   Subjective Pt agreeable toPT   Bed Mobility   Rolling L 3  Moderate assistance   Additional items Assist x 1; Increased time required   Supine to Sit 3  Moderate assistance   Additional items Assist x 1; Increased time required   Balance   Ambulatory Fair -   Endurance Deficit   Endurance Deficit Yes   Activity Tolerance   Activity Tolerance Patient limited by fatigue   Exercises   Hip Flexion Sitting;20 reps;AROM   Knee AROM Long Arc Quad Sitting;20 reps;AROM; Bilateral   Ankle Pumps Sitting;20 reps;AROM; Bilateral   Assessment   Prognosis Good   Problem List Decreased strength;Decreased range of motion;Decreased endurance; Impaired balance;Decreased mobility; Decreased coordination;Obesity;Pain   Goals   Patient Goals get stronger   STG Expiration Date 10/29/19   Plan   Treatment/Interventions   (COntinue per plan of care)   Progress Progressing toward goals   Recommendation   Recommendation Post acute IP rehab   Ollie Ferrara, PTA

## 2019-10-24 NOTE — PROGRESS NOTES
Progress Note - Sonja Mcelroy 1934, 80 y o  female MRN: [id number]    Unit/Bed#: 7T [facility] 711-02 Encounter: [id number]    Primary Care Provider: Basil Essex, DO   Date and time admitted to hospital: 10/21/2019  5:07 PM        * Acute on chronic respiratory failure with hypoxia [facility])  Assessment & Plan  Patient had sudden flash pulmonary edema and acute on chronic diastolic CHF exacerbation  She is now on oxygen via nasal cannula during the daytime and placed on BiPAP during the evening following which she has improvement of her symptoms  Pulmonology has recommended home BiPAP for her for which testing and evaluations will continue    Acute on chronic diastolic heart failure [facility])  Assessment & Plan  Wt Readings from Last 3 Encounters:   10/24/19 83 8 kg (184 lb 11 9 oz)   10/21/19 87 5 kg (192 lb 14 4 oz)   10/17/19 86 2 kg (190 lb 0 6 oz)       Patient is diuresing well  Will continue Lasix IV today and then transition to oral Lasix 80 mg twice daily tomorrow  Continue to monitor electrolytes and daily weights      Morbid obesity ([facility] [address] )  Assessment & Plan  Patient's BMI is 37 31  Counseled on diet exercise and lifestyle modification    Troponin level elevated  Assessment & Plan  Secondary to demand ischemia due to acute on chronic diastolic CHF exacerbation    Continue medical management    Controlled type 2 diabetes mellitus with diabetic neuropathy, without long-term current use of insulin [facility])  Assessment & Plan  Lab Results   Component Value Date    HGBA1C 5 0 08/16/2019       Recent Labs     10/23/19  1536 10/23/19  2025 10/24/19  0538 10/24/19  1058   POCGLU 155* 163* 163* 221*       Blood Sugar Average: Last 72 hrs:  (P) 604 [id number]   Patient's blood sugars are well controlled on current insulin regimen    AZUL (obstructive sleep apnea)  Assessment & Plan  Patient probably requires a BiPAP machine    SDH (subdural hematoma) ([facility])  Assessment & Plan  Patient recently developed a subdural hematoma after a fall which showed Minimal residual right parafalcine  hemorrhage in the inferior aspect of the falx  Anemia  Assessment & Plan  Patient has hemoglobin between 8-9      Acute-on-chronic kidney injury [facility])  Assessment & Plan  Patient's renal function is between 1 7-1 9  Avoid nephrotoxin agents  Avoid hypotension  Monitor electrolytes and renal function during diuresis  Nephrology once renal ultrasound with Dopplers to be done  Will treat to schedule it will she is at [address] catheter  Monitor voiding    Essential hypertension  Assessment & Plan  Patient's blood pressures are well controlled      VTE Pharmacologic Prophylaxis:   Pharmacologic: Heparin  Mechanical VTE Prophylaxis in Place: Yes    Patient Centered Rounds: I have performed bedside rounds with nursing staff today  Discussions with Specialists or Other Care Team Provider:  Discussed with Nephrology    Education and Discussions with Family / Patient:  Discussed with patient at bedside about hospital course and also with her daughter    Time Spent for Care: 45 minutes  More than 50% of total time spent on counseling and coordination of care as described above  Current Length of Stay: 3 day(s)    Current Patient Status: Inpatient   Certification Statement: The patient will continue to require additional inpatient hospital stay due to Shortness of breath    Discharge Plan:  Discharge to acute rehab once authorization obtained    Code Status: Level 1 - Full Code      Subjective:   Patient is complaining of not being able to have her renal ultrasound done today  She states that she feels her breathing is improving    She also feels like overall she is improving and she is nervous about these episodes of sudden shortness of breath that she decompensated so fast    Objective:     Vitals:   Temp (24hrs), Av °F (36 7 °C), Min:97 1 °F (36 2 °C), Max:98 5 °F (36 9 °C)    Temp:  [97 1 °F (36 2 °C)-98 5 °F (36 9 °C)] 97 1 °F (36 2 °C)  HR:  [64-72] 68  Resp:  [20] 20  BP: (108-136)/(52-63) 136/63  SpO2:  [95 %-100 %] 95 %  Body mass index is 37 31 kg/m²  Input and Output Summary (last 24 hours): Intake/Output Summary (Last 24 hours) at 10/24/2019 1438  Last data filed at 10/24/2019 1300  Gross per 24 hour   Intake 420 ml   Output 200 ml   Net 220 ml       Physical Exam:     Physical Exam   Constitutional: She is oriented to person, place, and time  She appears well-developed and well-nourished  HENT:   Head: Normocephalic and atraumatic  Right Ear: External ear normal    Left Ear: External ear normal    Mouth/Throat: Oropharynx is clear and moist    Eyes: Conjunctivae and EOM are normal    Neck: Normal range of motion  Neck supple  Cardiovascular: Normal rate, regular rhythm and normal heart sounds  Pulmonary/Chest: Effort normal    Moderate air entry bilaterally  Mild decreased breath sounds bilateral bases otherwise clear to auscultation bilaterally   Abdominal: Soft  Bowel sounds are normal  She exhibits no mass  There is no tenderness  There is no rebound and no guarding  Genitourinary:   Genitourinary Comments: deferred   Musculoskeletal: She exhibits edema  Neurological: She is alert and oriented to person, place, and time  She has normal reflexes  Cranial nerves 2-12 are normal   Normal neurological exam   Skin: Skin is warm and dry  No rash noted  Psychiatric: She has a normal mood and affect  Nursing note and vitals reviewed          Additional Data:     Labs:    Results from last 7 days   Lab Units 10/23/19  0451   WBC Thousand/uL 6 50   HEMOGLOBIN g/dL 8 1*   HEMATOCRIT % 24 1*   PLATELETS Thousands/uL 202   NEUTROS PCT % 66*   LYMPHS PCT % 23*   MONOS PCT % 8   EOS PCT % 2     Results from last 7 days   Lab Units 10/24/19  0428  10/22/19  0458   SODIUM mmol/L 136*   < > 133*   POTASSIUM mmol/L 3 7   < > 4 5   CHLORIDE mmol/L 96*   < > 94*   CO2 mmol/L 34*   < > 32*   BUN mg/dL 52*   < > 52* CREATININE mg/dL 1 78*   < > 1 63*   ANION GAP mmol/L 6   < > 7   CALCIUM mg/dL 8 8   < > 8 7   ALBUMIN g/dL  --   --  3 4   TOTAL BILIRUBIN mg/dL  --   --  0 70   ALK PHOS U/L  --   --  72   ALT U/L  --   --  32   AST U/L  --   --  50*   GLUCOSE RANDOM mg/dL 108*   < > 80    < > = values in this interval not displayed  Results from last 7 days   Lab Units 10/21/19  1853   INR  0 94     Results from last 7 days   Lab Units 10/24/19  1058 10/24/19  0538 10/23/19  2025 10/23/19  1536 10/23/19  1129 10/23/19  0450 10/22/19  2107 10/22/19  1601 10/22/19  1112 10/22/19  0457 10/22/19  0057 10/21/19  2153   POC GLUCOSE mg/dl 221* 163* 163* 155* 262* 111 133 136 131 88 79 96         Results from last 7 days   Lab Units 10/22/19  0458 10/22/19  0059 10/21/19  1853 10/21/19  1852   LACTIC ACID mmol/L  --  1 0  --  1 8   PROCALCITONIN ng/ml 1 19*  --  0 29*  --            * I Have Reviewed All Lab Data Listed Above  * Additional Pertinent Lab Tests Reviewed: [address] Admission Reviewed    Imaging:    Imaging Reports Reviewed Today Include:  CT abdomen pelvis and chest x-ray  Imaging Personally Reviewed by Myself Includes:  Chest x-ray    Recent Cultures (last 7 days):     Results from last 7 days   Lab Units 10/21/19  2059 10/21/19  1909   BLOOD CULTURE   --  No Growth at 48 hrs     LEGIONELLA URINARY ANTIGEN  Negative  --        Last 24 Hours Medication List:     Current Facility-Administered Medications:  acetaminophen 650 mg Oral Q6H PRN NATHEN Tolentino   amLODIPine 2 5 mg Oral Daily Adele Pabon MD   furosemide 80 mg Intravenous BID (diuretic) NATHEN Tolentino   gabapentin 300 mg Oral BID NATHEN Tolentino   heparin (porcine) 5,000 Units Subcutaneous [facility] NATHEN Tolentino   HYDROcodone-acetaminophen 1 tablet Oral Q6H PRN NATHEN Tolentino   HYDROcodone-acetaminophen 2 tablet Oral Q6H PRN NATHEN Tolentino   insulin lispro 1-5 Units Subcutaneous TID AC NATHEN Tolentino   ipratropium 0 5 mg Nebulization Q6H Olamide Noguera, NATHEN   levalbuterol 1 25 mg Nebulization Q6H Olamide Noguera, CRNP   levothyroxine 112 mcg Oral Daily Olamide Noguera, CRNP   methocarbamol 500 mg Oral Q6H PRN Estrella Cotton MD   metoprolol tartrate 25 mg Oral Q12H [address] Olamide Chuckie, CRNP   nystatin  Topical BID Olamide Noguera, CRNP   pantoprazole 40 mg Oral Early Morning Olamide Noguera, CRNP   polyethylene glycol 17 g Oral Daily PRN Olamide Noguera, LISSETTENP   sertraline 25 mg Oral HS Olamide Noguera, NATHEN        Today, Patient Was Seen By: Estrella Cotton MD    ** Please Note: Dictation voice to text software may have been used in the creation of this document   **

## 2019-10-24 NOTE — PLAN OF CARE
Problem: Prexisting or High Potential for Compromised Skin Integrity  Goal: Skin integrity is maintained or improved  Description  INTERVENTIONS:  - Identify patients at risk for skin breakdown  - Assess and monitor skin integrity  - Assess and monitor nutrition and hydration status  - Monitor labs   - Assess for incontinence   - Turn and reposition patient  - Assist with mobility/ambulation  - Relieve pressure over bony prominences  - Avoid friction and shearing  - Provide appropriate hygiene as needed including keeping skin clean and dry  - Evaluate need for skin moisturizer/barrier cream  - Collaborate with interdisciplinary team   - Patient/family teaching  - Consider wound care consult   Outcome: Progressing     Problem: Potential for Falls  Goal: Patient will remain free of falls  Description  INTERVENTIONS:  - Assess patient frequently for physical needs  -  Identify cognitive and physical deficits and behaviors that affect risk of falls    -  [place] fall precautions as indicated by assessment   - Educate patient/family on patient safety including physical limitations  - Instruct patient to call for assistance with activity based on assessment  - Modify environment to reduce risk of injury  - Consider OT/PT consult to assist with strengthening/mobility  Outcome: Progressing     Problem: PAIN - ADULT  Goal: Verbalizes/displays adequate comfort level or baseline comfort level  Description  Interventions:  - Encourage patient to monitor pain and request assistance  - Assess pain using appropriate pain scale  - Administer analgesics based on type and severity of pain and evaluate response  - Implement non-pharmacological measures as appropriate and evaluate response  - Consider cultural and social influences on pain and pain management  - Notify physician/advanced practitioner if interventions unsuccessful or patient reports new pain  Outcome: Progressing     Problem: INFECTION - ADULT  Goal: Absence or prevention of progression during hospitalization  Description  INTERVENTIONS:  - Assess and monitor for signs and symptoms of infection  - Monitor lab/diagnostic results  - Monitor all insertion sites, i e  indwelling lines, tubes, and drains  - Monitor endotracheal if appropriate and nasal secretions for changes in amount and color  - [place] appropriate cooling/warming therapies per order  - Administer medications as ordered  - Instruct and encourage patient and family to use good hand hygiene technique  - Identify and instruct in appropriate isolation precautions for identified infection/condition  Outcome: Progressing  Goal: Absence of fever/infection during neutropenic period  Description  INTERVENTIONS:  - Monitor WBC    Outcome: Progressing     Problem: SAFETY ADULT  Goal: Maintain or return to baseline ADL function  Description  INTERVENTIONS:  -  Assess patient's ability to carry out ADLs; assess patient's baseline for ADL function and identify physical deficits which impact ability to perform ADLs (bathing, care of mouth/teeth, toileting, grooming, dressing, etc )  - Assess/evaluate cause of self-care deficits   - Assess range of motion  - Assess patient's mobility; develop plan if impaired  - Assess patient's need for assistive devices and provide as appropriate  - Encourage maximum independence but intervene and supervise when necessary  - Involve family in performance of ADLs  - Assess for home care needs following discharge   - Consider OT consult to assist with ADL evaluation and planning for discharge  - Provide patient education as appropriate  Outcome: Progressing  Goal: Maintain or return mobility status to optimal level  Description  INTERVENTIONS:  - Assess patient's baseline mobility status (ambulation, transfers, stairs, etc )    - Identify cognitive and physical deficits and behaviors that affect mobility  - Identify mobility aids required to assist with transfers and/or ambulation (gait belt, sit-to-stand, lift, walker, cane, etc )  - [place] fall precautions as indicated by assessment  - Record patient progress and toleration of activity level on Mobility SBAR; progress patient to next Phase/Stage  - Instruct patient to call for assistance with activity based on assessment  - Consider rehabilitation consult to assist with strengthening/weightbearing, etc   Outcome: Progressing     Problem: DISCHARGE PLANNING - CARE MANAGEMENT  Goal: Discharge to post-acute care or home with appropriate resources  Description  INTERVENTIONS:  - Conduct assessment to determine patient/family and health care team treatment goals, and need for post-acute services based on payer coverage, community resources, and patient preferences, and barriers to discharge  - Address psychosocial, clinical, and financial barriers to discharge as identified in assessment in conjunction with the patient/family and health care team  - Arrange appropriate level of post-acute services according to patients   needs and preference and payer coverage in collaboration with the physician and health care team  - Communicate with and update the patient/family, physician, and health care team regarding progress on the discharge plan  - Arrange appropriate transportation to post-acute venues  Outcome: Progressing     Problem: Nutrition/Hydration-ADULT  Goal: Nutrient/Hydration intake appropriate for improving, restoring or maintaining nutritional needs  Description  Monitor and assess patient's nutrition/hydration status for malnutrition  Collaborate with interdisciplinary team and initiate plan and interventions as ordered  Monitor patient's weight and dietary intake as ordered or per policy  Utilize nutrition screening tool and intervene as necessary  Determine patient's food preferences and provide high-protein, high-caloric foods as appropriate       INTERVENTIONS:  - Monitor oral intake, urinary output, labs, and treatment plans  - Assess nutrition and hydration status and recommend course of action  - Evaluate amount of meals eaten  - Assist patient with eating if necessary   - Allow adequate time for meals  - Recommend/ encourage appropriate diets, oral nutritional supplements, and vitamin/mineral supplements  - Order, calculate, and assess calorie counts as needed  - Recommend, monitor, and adjust tube feedings and TPN/PPN based on assessed needs  - Assess need for intravenous fluids  - Provide specific nutrition/hydration education as appropriate  - Include patient/family/caregiver in decisions related to nutrition  Outcome: Progressing

## 2019-10-24 NOTE — NURSING NOTE
Patient is awake, alert, and oriented to person, place, and time  Denies any pain or shortness of breath  Vital signs are stable  Sitting up in the chair while her son is visiting  Transfer to [facility] is delayed until tomorrow  Patient will be NPO after midnight for renal artery ultrasound which they were unable to perform today  Patient understands  Call bell in reach  Will continue to monitor

## 2019-10-24 NOTE — TELEPHONE ENCOUNTER
11/27/2019-APT R/S TO 12/03/2019 11/21/2019-CT SCHEDULED ON 11/27/2019  CALLED PT'S DAUGHTER (HARISH) AND LEFT MESSAGE ON MACHINE TO RESCHEDULE HOSP F/U APT AFTER CT       11/19/2019-CALLED PT AGAIN, LEFT MESSAGE ON MACHINE TO SCHEDULE F/U APT AND CT       11/12/2019-CALLED PT, SPOKE TO HARISH (PT'S DAUGHTER), WHO STATES SHE WAS ON WAY TO APT AND WILL SCHEDULE CT AND CALL OFFICE BACK TO SCHEDULE F/U APT        11/01/2019- [address]    10/29/2019-PT STILL INPT AT [facility]    10/24/2019-PT INPT AT [facility]  10/25/2019 APT WAS CANCELLED

## 2019-10-24 NOTE — ASSESSMENT & PLAN NOTE
Patient had sudden flash pulmonary edema and acute on chronic diastolic CHF exacerbation  She is now on oxygen via nasal cannula during the daytime and placed on BiPAP during the evening following which she has improvement of her symptoms    Pulmonology has recommended home BiPAP for her for which testing and evaluations will continue

## 2019-10-24 NOTE — ASSESSMENT & PLAN NOTE
Patient recently developed a subdural hematoma after a fall which showed Minimal residual right parafalcine  hemorrhage in the inferior aspect of the falx

## 2019-10-24 NOTE — OCCUPATIONAL THERAPY NOTE
10/24/19 1040   Restrictions/Precautions   Weight Bearing Precautions Per Order No   Other Precautions O2;Telemetry; Fall Risk;Pain   Pain Assessment   Pain Assessment 0-10   Pain Score 6   ADL   Where Assessed Commode   LB Dressing Assistance 2  Maximal Assistance   LB Dressing Deficit Setup; Thread RLE into underwear; Thread LLE into underwear   LB Dressing Comments don/doff depends   Toileting Assistance  4  Minimal Assistance   Toileting Deficit Setup;Steadying;Supervison/safety; Increased time to complete; Bedside commode   Toileting Comments bladder management   Functional Standing Tolerance   Time 1 min   Activity static standing   Comments RW   Transfers   Sit to Stand 4  Minimal assistance   Additional items Assist x 1   Stand to Sit 4  Minimal assistance   Additional items Assist x 1   Stand pivot 4  Minimal assistance   Additional items Assist x 1   Toilet transfer 4  Minimal assistance   Additional items Assist x 1   Cognition   Overall Cognitive Status Impaired   Arousal/Participation Cooperative   Attention Attends with cues to redirect   Orientation Level Oriented to person;Oriented to situation   Memory Decreased short term memory   Following Commands Follows one step commands with increased time or repetition   Activity Tolerance   Activity Tolerance Patient tolerated treatment well   Assessment   Assessment Pt participates in OT session with focus on toileting, transfers, LB dressing, and activity tolerance to increase I for d/c  Pt min A sit to stand with RW support  Pt CGA SPT from chair to commode  Pt min A toileting for bladder management and requires steadying during stance  Pt takes extra time to complete toileting activity this session  Pt max A LB dressing to don/doff depends and pt only able to pull up depends around hips  Pt will continue to benefit from activity tolerance, adls, and transfers  Plan   Treatment Interventions ADL retraining;Functional transfer training; Activityengagement Goal Expiration Date 11/05/19   OT Treatment Day 2   OT Frequency 3-5x/wk   Recommendation   OT Discharge Recommendation Short Term Rehab  (acute IP rehab)

## 2019-10-25 PROBLEM — I21.A1 TYPE 2 MI (MYOCARDIAL INFARCTION) (HCC): Status: ACTIVE | Noted: 2019-10-21

## 2019-10-25 LAB
ANION GAP SERPL CALCULATED.3IONS-SCNC: 6 MMOL/L (ref 5–14)
BUN SERPL-MCNC: 55 MG/DL (ref 5–25)
CALCIUM SERPL-MCNC: 9.1 MG/DL (ref 8.4–10.2)
CHLORIDE SERPL-SCNC: 95 MMOL/L (ref 97–108)
CO2 SERPL-SCNC: 37 MMOL/L (ref 22–30)
CREAT SERPL-MCNC: 1.69 MG/DL (ref 0.6–1.2)
GFR SERPL CREATININE-BSD FRML MDRD: 27 ML/MIN/1.73SQ M
GLUCOSE SERPL-MCNC: 107 MG/DL (ref 65–140)
GLUCOSE SERPL-MCNC: 123 MG/DL (ref 65–140)
GLUCOSE SERPL-MCNC: 130 MG/DL (ref 65–140)
GLUCOSE SERPL-MCNC: 154 MG/DL (ref 65–140)
GLUCOSE SERPL-MCNC: 98 MG/DL (ref 70–99)
POTASSIUM SERPL-SCNC: 4.3 MMOL/L (ref 3.6–5)
SODIUM SERPL-SCNC: 138 MMOL/L (ref 137–147)

## 2019-10-25 PROCEDURE — 99232 SBSQ HOSP IP/OBS MODERATE 35: CPT | Performed by: FAMILY MEDICINE

## 2019-10-25 PROCEDURE — 82948 REAGENT STRIP/BLOOD GLUCOSE: CPT

## 2019-10-25 PROCEDURE — 94660 CPAP INITIATION&MGMT: CPT

## 2019-10-25 PROCEDURE — 97535 SELF CARE MNGMENT TRAINING: CPT

## 2019-10-25 PROCEDURE — 94760 N-INVAS EAR/PLS OXIMETRY 1: CPT

## 2019-10-25 PROCEDURE — 94640 AIRWAY INHALATION TREATMENT: CPT

## 2019-10-25 PROCEDURE — 80048 BASIC METABOLIC PNL TOTAL CA: CPT | Performed by: FAMILY MEDICINE

## 2019-10-25 PROCEDURE — 97530 THERAPEUTIC ACTIVITIES: CPT

## 2019-10-25 PROCEDURE — 99232 SBSQ HOSP IP/OBS MODERATE 35: CPT | Performed by: INTERNAL MEDICINE

## 2019-10-25 RX ADMIN — GABAPENTIN 300 MG: 300 CAPSULE ORAL at 09:07

## 2019-10-25 RX ADMIN — GABAPENTIN 300 MG: 300 CAPSULE ORAL at 17:41

## 2019-10-25 RX ADMIN — IPRATROPIUM BROMIDE 0.5 MG: 0.5 SOLUTION RESPIRATORY (INHALATION) at 14:14

## 2019-10-25 RX ADMIN — ACETAMINOPHEN 650 MG: 325 TABLET ORAL at 17:41

## 2019-10-25 RX ADMIN — ACETAMINOPHEN 650 MG: 325 TABLET ORAL at 11:41

## 2019-10-25 RX ADMIN — FUROSEMIDE 80 MG: 10 INJECTION, SOLUTION INTRAVENOUS at 16:03

## 2019-10-25 RX ADMIN — SERTRALINE HYDROCHLORIDE 25 MG: 50 TABLET ORAL at 21:48

## 2019-10-25 RX ADMIN — LEVALBUTEROL HYDROCHLORIDE 1.25 MG: 1.25 SOLUTION, CONCENTRATE RESPIRATORY (INHALATION) at 14:13

## 2019-10-25 RX ADMIN — FUROSEMIDE 80 MG: 10 INJECTION, SOLUTION INTRAVENOUS at 09:10

## 2019-10-25 RX ADMIN — LEVOTHYROXINE SODIUM 112 MCG: 112 TABLET ORAL at 05:51

## 2019-10-25 RX ADMIN — METHOCARBAMOL 500 MG: 500 TABLET, FILM COATED ORAL at 09:06

## 2019-10-25 RX ADMIN — METHOCARBAMOL 500 MG: 500 TABLET, FILM COATED ORAL at 17:41

## 2019-10-25 RX ADMIN — ACETAMINOPHEN 650 MG: 325 TABLET ORAL at 23:42

## 2019-10-25 RX ADMIN — HEPARIN SODIUM 5000 UNITS: 5000 INJECTION INTRAVENOUS; SUBCUTANEOUS at 05:51

## 2019-10-25 RX ADMIN — IPRATROPIUM BROMIDE 0.5 MG: 0.5 SOLUTION RESPIRATORY (INHALATION) at 19:43

## 2019-10-25 RX ADMIN — HEPARIN SODIUM 5000 UNITS: 5000 INJECTION INTRAVENOUS; SUBCUTANEOUS at 23:37

## 2019-10-25 RX ADMIN — PANTOPRAZOLE SODIUM 40 MG: 40 TABLET, DELAYED RELEASE ORAL at 05:51

## 2019-10-25 RX ADMIN — POLYETHYLENE GLYCOL 3350 17 G: 17 POWDER, FOR SOLUTION ORAL at 09:07

## 2019-10-25 RX ADMIN — HEPARIN SODIUM 5000 UNITS: 5000 INJECTION INTRAVENOUS; SUBCUTANEOUS at 14:32

## 2019-10-25 RX ADMIN — METOPROLOL TARTRATE 25 MG: 25 TABLET, FILM COATED ORAL at 09:08

## 2019-10-25 RX ADMIN — LEVALBUTEROL HYDROCHLORIDE 1.25 MG: 1.25 SOLUTION, CONCENTRATE RESPIRATORY (INHALATION) at 19:43

## 2019-10-25 NOTE — ASSESSMENT & PLAN NOTE
Wt Readings from Last 3 Encounters:   10/25/19 83 4 kg (183 lb 13 8 oz)   10/21/19 87 5 kg (192 lb 14 4 oz)   10/17/19 86 2 kg (190 lb 0 6 oz)       Patient is diuresing well    Will continue Lasix IV today and then transition to oral Lasix 80 mg twice daily tomorrow  Continue to monitor electrolytes and daily weights

## 2019-10-25 NOTE — PROGRESS NOTES
Progress Note - Yoni Dates 1934, 80 y o  female MRN: [id number]    Unit/Bed#: 7T [facility] 711-02 Encounter: [id number]    Primary Care Provider: Espinoza Martin DO   Date and time admitted to hospital: 10/21/2019  5:07 PM        * Acute on chronic respiratory failure with hypoxia [facility])  Assessment & Plan  Patient had sudden flash pulmonary edema and acute on chronic diastolic CHF exacerbation  She is now on oxygen via nasal cannula during the daytime and placed on BiPAP during the evening following which she has improvement of her symptoms  Pulmonology has recommended home BiPAP for her for which testing and evaluations will continue    Acute on chronic diastolic heart failure [facility])  Assessment & Plan  Wt Readings from Last 3 Encounters:   10/25/19 83 4 kg (183 lb 13 8 oz)   10/21/19 87 5 kg (192 lb 14 4 oz)   10/17/19 86 2 kg (190 lb 0 6 oz)       Patient is diuresing well  Will continue Lasix IV today and then transition to oral Lasix 80 mg twice daily tomorrow  Continue to monitor electrolytes and daily weights      [facility]  Assessment & Plan  Patient's BMI is 37 31  Counseled on diet exercise and lifestyle modification    Type 2 MI (myocardial infarction) [facility])  Assessment & Plan  Secondary to demand ischemia due to acute on chronic diastolic CHF exacerbation    Continue medical management    Controlled type 2 diabetes mellitus with diabetic neuropathy, without long-term current use of insulin [facility])  Assessment & Plan  Lab Results   Component Value Date    HGBA1C 5 0 08/16/2019       Recent Labs     10/24/19  1555 10/24/19  2109 10/25/19  0554 10/25/19  1104   POCGLU 144* 127 107 123       Blood Sugar Average: Last 72 hrs:  (P) 376 [id number]   Patient's blood sugars are well controlled on current insulin regimen    AZUL (obstructive sleep apnea)  Assessment & Plan  Patient probably requires a BiPAP machine    SDH (subdural hematoma) ([facility] )  Assessment & Plan  Patient recently developed a subdural hematoma after a fall which showed Minimal residual right parafalcine  hemorrhage in the inferior aspect of the falx  Anemia  Assessment & Plan  Patient has hemoglobin between 8-9      Acute-on-chronic kidney injury [facility])  Assessment & Plan  Patient's renal function is between 1 7-1 9  Avoid nephrotoxin agents  Avoid hypotension  Monitor electrolytes and renal function during diuresis  Off Heller catheter  Monitor voiding    Essential hypertension  Assessment & Plan  Patient's blood pressures are well controlled      VTE Pharmacologic Prophylaxis:   Pharmacologic: Heparin  Mechanical VTE Prophylaxis in Place: Yes    Patient Centered Rounds: I have performed bedside rounds with nursing staff today  Discussions with Specialists or Other Care Team Provider:  Discussed with Nephrology    Education and Discussions with Family / Patient:  Discussed with patient at bedside about hospital course    Time Spent for Care: 30 minutes  More than 50% of total time spent on counseling and coordination of care as described above  Current Length of Stay: 4 day(s)    Current Patient Status: Inpatient   Certification Statement: The patient will continue to require additional inpatient hospital stay due to Shortness of breath    Discharge Plan:  Discharge to rehab in 24 hours    Code Status: Level 1 - Full Code      Subjective:   Patient states that she is feeling a little better with her breathing her still has significant swelling and unable to walk much and feels very fatigued and deconditioned    Objective:     Vitals:   Temp (24hrs), Av 2 °F (36 2 °C), Min:97 °F (36 1 °C), Max:97 6 °F (36 4 °C)    Temp:  [97 °F (36 1 °C)-97 6 °F (36 4 °C)] 97 °F (36 1 °C)  HR:  [60-67] 63  Resp:  [16-20] 18  BP: (113-146)/(56-75) 115/56  SpO2:  [93 %-100 %] 100 %  Body mass index is 37 14 kg/m²  Input and Output Summary (last 24 hours):        Intake/Output Summary (Last 24 hours) at 10/25/2019 9504  Last data filed at 10/25/2019 1300  Gross per 24 hour   Intake 960 ml   Output 600 ml   Net 360 ml       Physical Exam:     Physical Exam   Constitutional: She is oriented to person, place, and time  She appears well-developed and well-nourished  HENT:   Head: Normocephalic and atraumatic  Right Ear: External ear normal    Left Ear: External ear normal    Mouth/Throat: Oropharynx is clear and moist    Eyes: Conjunctivae and EOM are normal    Neck: Normal range of motion  Neck supple  Cardiovascular: Normal rate, regular rhythm and normal heart sounds  Pulmonary/Chest:   Increased work of breathing noted and moderate air entry bilaterally with decreased breath sounds bilateral bases   Abdominal: Soft  Bowel sounds are normal  She exhibits no mass  There is no tenderness  There is no rebound and no guarding  Genitourinary:   Genitourinary Comments: deferred   Musculoskeletal: She exhibits edema  Neurological: She is alert and oriented to person, place, and time  She has normal reflexes  Cranial nerves 2-12 are normal   Normal neurological exam   Skin: Skin is warm and dry  No rash noted  Psychiatric: She has a normal mood and affect  Nursing note and vitals reviewed          Additional Data:     Labs:    Results from last 7 days   Lab Units 10/23/19  0451   WBC Thousand/uL 6 50   HEMOGLOBIN g/dL 8 1*   HEMATOCRIT % 24 1*   PLATELETS Thousands/uL 202   NEUTROS PCT % 66*   LYMPHS PCT % 23*   MONOS PCT % 8   EOS PCT % 2     Results from last 7 days   Lab Units 10/25/19  0457  10/22/19  0458   SODIUM mmol/L 138   < > 133*   POTASSIUM mmol/L 4 3   < > 4 5   CHLORIDE mmol/L 95*   < > 94*   CO2 mmol/L 37*   < > 32*   BUN mg/dL 55*   < > 52*   CREATININE mg/dL 1 69*   < > 1 63*   ANION GAP mmol/L 6   < > 7   CALCIUM mg/dL 9 1   < > 8 7   ALBUMIN g/dL  --   --  3 4   TOTAL BILIRUBIN mg/dL  --   --  0 70   ALK PHOS U/L  --   --  72   ALT U/L  --   --  32   AST U/L  --   --  50*   GLUCOSE RANDOM mg/dL 98   < > 80    < > = values in this interval not displayed  Results from last 7 days   Lab Units 10/21/19  1853   INR  0 94     Results from last 7 days   Lab Units 10/25/19  1104 10/25/19  0554 10/24/19  2109 10/24/19  1555 10/24/19  1058 10/24/19  0538 10/23/19  2025 10/23/19  1536 10/23/19  1129 10/23/19  0450 10/22/19  2107 10/22/19  1601   POC GLUCOSE mg/dl 123 107 127 144* 221* 163* 163* 155* 262* 111 133 136         Results from last 7 days   Lab Units 10/22/19  0458 10/22/19  0059 10/21/19  1853 10/21/19  1852   LACTIC ACID mmol/L  --  1 0  --  1 8   PROCALCITONIN ng/ml 1 19*  --  0 29*  --            * I Have Reviewed All Lab Data Listed Above  * Additional Pertinent Lab Tests Reviewed: [address] Admission Reviewed    Imaging:    Imaging Reports Reviewed Today Include:  Chest x-ray  Imaging Personally Reviewed by Myself Includes:  None    Recent Cultures (last 7 days):     Results from last 7 days   Lab Units 10/21/19  2059 10/21/19  1909   BLOOD CULTURE   --  No Growth at 72 hrs     LEGIONELLA URINARY ANTIGEN  Negative  --        Last 24 Hours Medication List:     Current Facility-Administered Medications:  acetaminophen 650 mg Oral Q6H PRN Sameul Seat, CRNP   amLODIPine 2 5 mg Oral Daily Mauricio Patel MD   furosemide 80 mg Intravenous BID (diuretic) Sameul Seat, CRNP   gabapentin 300 mg Oral BID Sameul Seat, CRNP   heparin (porcine) 5,000 Units Subcutaneous Replaced by [facility] Sameul Seat, CRNP   HYDROcodone-acetaminophen 1 tablet Oral Q6H PRN Sameul Seat, CRNP   HYDROcodone-acetaminophen 2 tablet Oral Q6H PRN Sameul Seat, CRNP   insulin lispro 1-5 Units Subcutaneous TID AC Sameul Seat, CRNP   ipratropium 0 5 mg Nebulization Q6H Sameul Seat, CRNP   levalbuterol 1 25 mg Nebulization Q6H Sameul Seat, CRNP   levothyroxine 112 mcg Oral Daily Sameul Seat, CRNP   methocarbamol 500 mg Oral Q6H PRN Lenin Kamara MD   metoprolol tartrate 25 mg Oral Q12H [address] Sameul Seat, CRNP   nystatin  Topical BID Sameul Seat, CRNP pantoprazole 40 mg Oral Early Morning NATHEN Izquierdo   polyethylene glycol 17 g Oral Daily PRN NATHEN Izquierdo   sertraline 25 mg Oral HS NATHEN Izquierdo        Today, Patient Was Seen By: Vero Stout MD    ** Please Note: Dictation voice to text software may have been used in the creation of this document   **

## 2019-10-25 NOTE — NURSING NOTE
Pt offers no complaints  Aware of NPO status after midnight for u/s in the AM  Pt is alert and oriented  All needs in reach, will continue to monitor

## 2019-10-25 NOTE — CASE MANAGEMENT
[facility] to [facility]  Per Kierra Elliott remains pending at this time

## 2019-10-25 NOTE — ASSESSMENT & PLAN NOTE
Patient's renal function is between 1 7-1 9  Avoid nephrotoxin agents  Avoid hypotension  Monitor electrolytes and renal function during diuresis  Off Heller catheter    Monitor voiding

## 2019-10-25 NOTE — PLAN OF CARE
Problem: OCCUPATIONAL THERAPY ADULT  Goal: Performs self-care activities at highest level of function for planned discharge setting  See evaluation for individualized goals  Description  Treatment Interventions: ADL retraining, Functional transfer training, UE strengthening/ROM, Endurance training, Cognitive reorientation, Continued evaluation          See flowsheet documentation for full assessment, interventions and recommendations  Outcome: Progressing  Note:   Limitation: Decreased ADL status, Decreased UE ROM, Decreased UE strength, Decreased Safe judgement during ADL, Decreased cognition, Decreased endurance, Decreased high-level ADLs  Prognosis: Good  Assessment: Pt seen for OT txt  Pt pleasant, endorses fatigue from poor sleep last night  Pt able to tolerate ~ 10 minutes of unsupported sitting to complete grooming and UB dressing tasks at EOB  Pt completed sit-->stand and steps at EOB with Deondre x 1  Pt requesting return to bed  Pt overall demonstrating improvement with activity tolerance and endurance, improved O2 saturations, continues to require cues for compensatory breathing  Pt would benefit from continued OT services to further address deficits with functional strength, safety awareness, and dynamic standing balance  Continue OT POC        OT Discharge Recommendation: Short Term Rehab

## 2019-10-25 NOTE — PLAN OF CARE
Problem: Prexisting or High Potential for Compromised Skin Integrity  Goal: Skin integrity is maintained or improved  Description  INTERVENTIONS:  - Identify patients at risk for skin breakdown  - Assess and monitor skin integrity  - Assess and monitor nutrition and hydration status  - Monitor labs   - Assess for incontinence   - Turn and reposition patient  - Assist with mobility/ambulation  - Relieve pressure over bony prominences  - Avoid friction and shearing  - Provide appropriate hygiene as needed including keeping skin clean and dry  - Evaluate need for skin moisturizer/barrier cream  - Collaborate with interdisciplinary team   - Patient/family teaching  - Consider wound care consult   Outcome: Progressing     Problem: Potential for Falls  Goal: Patient will remain free of falls  Description  INTERVENTIONS:  - Assess patient frequently for physical needs  -  Identify cognitive and physical deficits and behaviors that affect risk of falls    -  [place] fall precautions as indicated by assessment   - Educate patient/family on patient safety including physical limitations  - Instruct patient to call for assistance with activity based on assessment  - Modify environment to reduce risk of injury  - Consider OT/PT consult to assist with strengthening/mobility  Outcome: Progressing     Problem: PAIN - ADULT  Goal: Verbalizes/displays adequate comfort level or baseline comfort level  Description  Interventions:  - Encourage patient to monitor pain and request assistance  - Assess pain using appropriate pain scale  - Administer analgesics based on type and severity of pain and evaluate response  - Implement non-pharmacological measures as appropriate and evaluate response  - Consider cultural and social influences on pain and pain management  - Notify physician/advanced practitioner if interventions unsuccessful or patient reports new pain  Outcome: Progressing     Problem: INFECTION - ADULT  Goal: Absence or prevention of progression during hospitalization  Description  INTERVENTIONS:  - Assess and monitor for signs and symptoms of infection  - Monitor lab/diagnostic results  - Monitor all insertion sites, i e  indwelling lines, tubes, and drains  - Monitor endotracheal if appropriate and nasal secretions for changes in amount and color  - [place] appropriate cooling/warming therapies per order  - Administer medications as ordered  - Instruct and encourage patient and family to use good hand hygiene technique  - Identify and instruct in appropriate isolation precautions for identified infection/condition  Outcome: Progressing  Goal: Absence of fever/infection during neutropenic period  Description  INTERVENTIONS:  - Monitor WBC    Outcome: Progressing     Problem: SAFETY ADULT  Goal: Maintain or return to baseline ADL function  Description  INTERVENTIONS:  -  Assess patient's ability to carry out ADLs; assess patient's baseline for ADL function and identify physical deficits which impact ability to perform ADLs (bathing, care of mouth/teeth, toileting, grooming, dressing, etc )  - Assess/evaluate cause of self-care deficits   - Assess range of motion  - Assess patient's mobility; develop plan if impaired  - Assess patient's need for assistive devices and provide as appropriate  - Encourage maximum independence but intervene and supervise when necessary  - Involve family in performance of ADLs  - Assess for home care needs following discharge   - Consider OT consult to assist with ADL evaluation and planning for discharge  - Provide patient education as appropriate  Outcome: Progressing  Goal: Maintain or return mobility status to optimal level  Description  INTERVENTIONS:  - Assess patient's baseline mobility status (ambulation, transfers, stairs, etc )    - Identify cognitive and physical deficits and behaviors that affect mobility  - Identify mobility aids required to assist with transfers and/or ambulation (gait belt, sit-to-stand, lift, walker, cane, etc )  - [place] fall precautions as indicated by assessment  - Record patient progress and toleration of activity level on Mobility SBAR; progress patient to next Phase/Stage  - Instruct patient to call for assistance with activity based on assessment  - Consider rehabilitation consult to assist with strengthening/weightbearing, etc   Outcome: Progressing     Problem: DISCHARGE PLANNING - CARE MANAGEMENT  Goal: Discharge to post-acute care or home with appropriate resources  Description  INTERVENTIONS:  - Conduct assessment to determine patient/family and health care team treatment goals, and need for post-acute services based on payer coverage, community resources, and patient preferences, and barriers to discharge  - Address psychosocial, clinical, and financial barriers to discharge as identified in assessment in conjunction with the patient/family and health care team  - Arrange appropriate level of post-acute services according to patients   needs and preference and payer coverage in collaboration with the physician and health care team  - Communicate with and update the patient/family, physician, and health care team regarding progress on the discharge plan  - Arrange appropriate transportation to post-acute venues  Outcome: Progressing     Problem: Nutrition/Hydration-ADULT  Goal: Nutrient/Hydration intake appropriate for improving, restoring or maintaining nutritional needs  Description  Monitor and assess patient's nutrition/hydration status for malnutrition  Collaborate with interdisciplinary team and initiate plan and interventions as ordered  Monitor patient's weight and dietary intake as ordered or per policy  Utilize nutrition screening tool and intervene as necessary  Determine patient's food preferences and provide high-protein, high-caloric foods as appropriate       INTERVENTIONS:  - Monitor oral intake, urinary output, labs, and treatment plans  - Assess nutrition and hydration status and recommend course of action  - Evaluate amount of meals eaten  - Assist patient with eating if necessary   - Allow adequate time for meals  - Recommend/ encourage appropriate diets, oral nutritional supplements, and vitamin/mineral supplements  - Order, calculate, and assess calorie counts as needed  - Recommend, monitor, and adjust tube feedings and TPN/PPN based on assessed needs  - Assess need for intravenous fluids  - Provide specific nutrition/hydration education as appropriate  - Include patient/family/caregiver in decisions related to nutrition  Outcome: Progressing

## 2019-10-25 NOTE — ASSESSMENT & PLAN NOTE
Lab Results   Component Value Date    HGBA1C 5 0 08/16/2019       Recent Labs     10/24/19  1555 10/24/19  2109 10/25/19  0554 10/25/19  1104   POCGLU 144* 127 107 123       Blood Sugar Average: Last 72 hrs:  (P) 292 [id number]   Patient's blood sugars are well controlled on current insulin regimen

## 2019-10-25 NOTE — PROGRESS NOTES
NEPHROLOGY PROGRESS NOTE   Yaima Ornelas 80 y o  female MRN: [id number]  Unit/Bed#: 7T [facility] 711-02 Encounter: [id number]  Reason for Consult: HERLINDA/CKD    ASSESSMENT/PLAN:  1  Acute kidney injury: - Suspect secondary to fluctuations in blood pressure variability, relative hypotension, in the setting of acute pulmonary edema  -creatinine slowly improving with IV diuretic use  -creatinine increased to 1 90 and currently 1 69 -may require higher creatinine to keep euvolemic  -urinalysis on admission was bland without hematuria or proteinuria  -continue to follow I/O, lab values in volume status  -weight fairly stable at 83 4 kg no change with diuretic use  -urinary output not well documented  -will add purwik for accurate I/O  -avoid nephrotoxins  -avoid hypotension  -discussed with Dr Pj Myles      2  Chronic kidney disease III:  Suspect etiology secondary to hypertensive nephrosclerosis, known renal artery stenosis, age-related nephron loss and prior episodes of AK I could be a component  -previously followed Dr Cj Ocampo but was last seen in 2015  -after review of the medical records baseline appears to be 1 2-1 3 however has had elevation in creatinine recently in August to 1 5-may have higher baseline  -will need follow-up on discharge     3  Diastolic congestive heart failure: With acute on chronic respiratory failure with hypoxia  -Repeat echocardiogram done 10/22/2019:  Reports "EF of 41%  With Grade 2 diastolic dysfunction  Also seen mild-to-moderate pulmonary hypertension  46mmhg   -cardiology following  -currently on IV Lasix 80 mg b i d -status post metolazone 5 mg yesterday-will redose today  -no noted improvement in weight or crackles  -of note she does have a history of renal artery stenosis and is status post renal artery stent  -unfortunately unable to obtain renal artery Doppler-unable to tolerate pressure  -may need to be obtained as outpatient     4   Hypertension:  BP stable  -avoid hypotension to prevent decreased renal perfusion  -hold parameters on amlodipine for SBP less than 130 in place  -will continue to trend     5  Recent subdural hematoma:  Previously and arc  -will return once medically stable for ongoing rehab      6  Anemia:  Hemoglobin stable at 8 1 last checked  -per primary team  -consider transfusion for hemoglobin less than 7 0 or symptomatic  -iron stores on 10/21/2019 within normal limits-iron saturations 23%, iron 62, ferritin 229, folate > 20    7  Hyponatremia:  Suspect secondary to hypervolemia  -improved to resolved with diuresis  -current serum sodium 138  -status post potassium replacement yesterday and current potassium 4 3    8  Recent subdural hematoma:  Patient being evaluated for rehab   -case management following      SUBJECTIVE:  Patient seen and examined  Denies chest pain shortness of breath    Renal artery Doppler not performed today    OBJECTIVE:  Current Weight: Weight - Scale: 83 4 kg (183 lb 13 8 oz)  Vitals:    10/24/19 2138 10/24/19 2330 10/25/19 0600 10/25/19 0744   BP: 146/75 121/66  113/66   BP Location:  Right arm  Right arm   Pulse: 67 67  60   Resp:  16  20   Temp:  97 6 °F (36 4 °C)  (!) 97 1 °F (36 2 °C)   TempSrc:  Temporal  Temporal   SpO2:  93%  100%   Weight:   83 4 kg (183 lb 13 8 oz)    Height:           Intake/Output Summary (Last 24 hours) at 10/25/2019 0949  Last data filed at 10/24/2019 1855  Gross per 24 hour   Intake 780 ml   Output 200 ml   Net 580 ml     General:  No acute distress, cooperative, out of bed  Skin:  Warm and dry without rash  HEENT:  Mucous membranes moist  Neck:  Supple without JVD noted  Lungs:  Fine few scattered rales on inspiratory and expiratory wheezes decreased bases  Cardiac:  Regular rate and rhythm, no rub  Extremities:  Trace lower extremity edema noted bilaterally  GI:  Soft, nontender, no distention, active bowel sounds  Neuro:  Alert oriented awake  Psych:  Appropriate affect    Medications:    Current Facility-Administered Medications:     acetaminophen (TYLENOL) tablet 650 mg, 650 mg, Oral, Q6H PRN, NATHEN Ponce, 650 mg at 10/24/19 1123    amLODIPine (NORVASC) tablet 2 5 mg, 2 5 mg, Oral, Daily, Nica Turpin MD, 2 5 mg at 10/24/19 0954    furosemide (LASIX) injection 80 mg, 80 mg, Intravenous, BID (diuretic), NATHEN Ponce, 80 mg at 10/25/19 0910    gabapentin (NEURONTIN) capsule 300 mg, 300 mg, Oral, BID, NATHEN Ponce, 300 mg at 10/25/19 5517    heparin (porcine) subcutaneous injection 5,000 Units, 5,000 Units, Subcutaneous, Q8H [facility], 5,000 Units at 10/25/19 0551 **AND** [CANCELED] Platelet count, , , Once, Janessa Mackay PA-C    HYDROcodone-acetaminophen (NORCO) 5-325 mg per tablet 1 tablet, 1 tablet, Oral, Q6H PRN, NATHEN Ponce    HYDROcodone-acetaminophen (NORCO) 5-325 mg per tablet 2 tablet, 2 tablet, Oral, Q6H PRN, NATHEN Ponce    insulin lispro (HumaLOG) 100 units/mL subcutaneous injection 1-5 Units, 1-5 Units, Subcutaneous, TID AC, 1 Units at 10/24/19 1122 **AND** Fingerstick Glucose (POCT), , , TID AC, NATHEN Ponce    ipratropium (ATROVENT) 0 02 % inhalation solution 0 5 mg, 0 5 mg, Nebulization, Q6H, NATHEN Ponce, 0 5 mg at 10/24/19 1953    levalbuterol [facility]) inhalation solution 1 25 mg, 1 25 mg, Nebulization, Q6H, NATHEN Ponce, 1 25 mg at 10/24/19 1953    levothyroxine tablet 112 mcg, 112 mcg, Oral, Daily, NATHEN Ponce, 112 mcg at 10/25/19 0551    methocarbamol (ROBAXIN) tablet 500 mg, 500 mg, Oral, Q6H PRN, Rosalinda Menezes MD, 500 mg at 10/25/19 0906    metoprolol tartrate (LOPRESSOR) tablet 25 mg, 25 mg, Oral, Q12H [facility], NATHEN Ponce, 25 mg at 10/25/19 0908    nystatin (MYCOSTATIN) powder, , Topical, BID, NATHEN Ponce    pantoprazole (PROTONIX) EC tablet 40 mg, 40 mg, Oral, Early Morning, NATHEN Ponce, 40 mg at 10/25/19 0551    polyethylene glycol (MIRALAX) packet 17 g, 17 g, Oral, Daily PRN, NATHEN Ponce, 17 g at 10/25/19 4649    sertraline (ZOLOFT) tablet 25 mg, 25 mg, Oral, HS, Tamera Bejarano, CRNP, 25 mg at 10/24/19 2137    Laboratory Results:  Results from last 7 days   Lab Units 10/25/19  0457 10/24/19  0428 10/23/19  0451 10/22/19  1242 10/22/19  0458 10/21/19  2200 10/21/19  1853 10/21/19  0533   WBC Thousand/uL  --   --  6 50 6 20 6 90  --  11 80*  --    HEMOGLOBIN g/dL  --   --  8 1* 8 0* 7 9*  --  9 7* 8 8*   HEMATOCRIT %  --   --  24 1* 24 0* 23 4*  --  30 1* 26 4*   PLATELETS Thousands/uL  --   --  202 195 204  --  261  --    POTASSIUM mmol/L 4 3 3 7 4 1  --  4 5 4 9 5 2* 4 3   CHLORIDE mmol/L 95* 96* 95*  --  94*  --  91* 94*   CO2 mmol/L 37* 34* 33*  --  32*  --  30 32*   BUN mg/dL 55* 52* 51*  --  52*  --  48* 46*   CREATININE mg/dL 1 69* 1 78* 1 84*  --  1 63*  --  1 90* 1 41*   CALCIUM mg/dL 9 1 8 8 8 9  --  8 7  --  9 4 9 1   MAGNESIUM mg/dL  --   --  2 3  --  2 1  --  2 4*  --    PHOSPHORUS mg/dL  --   --  4 4  --  5 9*  --  7 9* 4 0

## 2019-10-25 NOTE — OCCUPATIONAL THERAPY NOTE
Occupational Therapy Treatment Note     Patient Name: Elli Rosales  VDIPS'E Date: 10/25/2019  Problem List  Principal Problem:    Acute on chronic respiratory failure with hypoxia [facility])  Active Problems:    Essential hypertension    Acute on chronic diastolic heart failure (HCC)    Acute-on-chronic kidney injury (HCC)    Anemia    SDH (subdural hematoma) (HCC)    AZUL (obstructive sleep apnea)    Controlled type 2 diabetes mellitus with diabetic neuropathy, without long-term current use of insulin ([facility])    Troponin level elevated    Morbid obesity ([facility] [address] )            10/25/19 1130   Restrictions/Precautions   Weight Bearing Precautions Per Order No   Other Precautions O2;Telemetry; Fall Risk   Pain Assessment   Pain Assessment No/denies pain   ADL   Grooming Assistance 5  Supervision/Setup   Grooming Deficit Teeth care   UB Dressing Assistance 4  Minimal Assistance   UB Dressing Deficit   (for gown change )   Functional Standing Tolerance   Time ~ 1 minute   Activity functional static stand    Comments with use of a RW   Bed Mobility   Supine to Sit 3  Moderate assistance   Additional items Assist x 1; Increased time required;LE management   Sit to Supine 2  Maximal assistance   Additional items Assist x 1;LE management;Verbal cues; Increased time required   Transfers   Sit to Stand 4  Minimal assistance   Additional items Assist x 1   Stand to Sit 4  Minimal assistance   Additional items Assist x 1   Cognition   Arousal/Participation Cooperative; Alert   Attention Attends with cues to redirect   Orientation Level Oriented to person;Oriented to place; Disoriented to time   Memory Decreased short term memory;Decreased recall of recent events   Following Commands Follows one step commands without difficulty   Comments Pt with initial mild confusion, "how long have I been here?" Not oriented to year    Activity Tolerance   Activity Tolerance Patient tolerated treatment well   Assessment   Assessment Pt seen for OT txt    Pt pleasant, endorses fatigue from poor sleep last night  Pt able to tolerate ~ 10 minutes of unsupported sitting to complete grooming and UB dressing tasks at EOB  Pt completed sit-->stand and steps at EOB with Deondre x 1  Pt requesting return to bed  Pt overall demonstrating improvement with activity tolerance and endurance, improved O2 saturations, continues to require cues for compensatory breathing  Pt would benefit from continued OT services to further address deficits with functional strength, safety awareness, and dynamic standing balance  Continue OT POC  Plan   Treatment Interventions ADL retraining;Functional transfer training;UE strengthening/ROM; Endurance training;Continued evaluation; Energy conservation; Activityengagement   Goal Expiration Date 11/05/19   OT Treatment Day 3   OT Frequency 3-5x/wk   Recommendation   OT Discharge Recommendation Short Term Rehab  (acute IP rehab)

## 2019-10-25 NOTE — PHYSICAL THERAPY NOTE
Nursing reports that patient is scheduled for ultrasound this morning, shortly  Will follow up at a later time as schedule permits      Jorge Mandel, PT

## 2019-10-25 NOTE — CASE MANAGEMENT
CM awaiting notification of insurance authorization for [facility], IMM#[id number] explained patient verbalized understanding and form signed and placed into bin for scanning into EHR

## 2019-10-26 ENCOUNTER — APPOINTMENT (INPATIENT)
Dept: RADIOLOGY | Facility: HOSPITAL | Age: 84
DRG: 280 | End: 2019-10-26
Payer: COMMERCIAL

## 2019-10-26 LAB
ANION GAP SERPL CALCULATED.3IONS-SCNC: 7 MMOL/L (ref 5–14)
BACTERIA BLD CULT: NORMAL
BUN SERPL-MCNC: 57 MG/DL (ref 5–25)
CALCIUM SERPL-MCNC: 9.4 MG/DL (ref 8.4–10.2)
CHLORIDE SERPL-SCNC: 93 MMOL/L (ref 97–108)
CO2 SERPL-SCNC: 36 MMOL/L (ref 22–30)
CREAT SERPL-MCNC: 1.83 MG/DL (ref 0.6–1.2)
GFR SERPL CREATININE-BSD FRML MDRD: 25 ML/MIN/1.73SQ M
GLUCOSE SERPL-MCNC: 103 MG/DL (ref 65–140)
GLUCOSE SERPL-MCNC: 105 MG/DL (ref 70–99)
POTASSIUM SERPL-SCNC: 4.1 MMOL/L (ref 3.6–5)
SODIUM SERPL-SCNC: 136 MMOL/L (ref 137–147)

## 2019-10-26 PROCEDURE — 99232 SBSQ HOSP IP/OBS MODERATE 35: CPT | Performed by: FAMILY MEDICINE

## 2019-10-26 PROCEDURE — 80048 BASIC METABOLIC PNL TOTAL CA: CPT | Performed by: NURSE PRACTITIONER

## 2019-10-26 PROCEDURE — 82948 REAGENT STRIP/BLOOD GLUCOSE: CPT

## 2019-10-26 PROCEDURE — 94760 N-INVAS EAR/PLS OXIMETRY 1: CPT

## 2019-10-26 PROCEDURE — 71045 X-RAY EXAM CHEST 1 VIEW: CPT

## 2019-10-26 PROCEDURE — 94640 AIRWAY INHALATION TREATMENT: CPT

## 2019-10-26 PROCEDURE — 99232 SBSQ HOSP IP/OBS MODERATE 35: CPT | Performed by: PHYSICIAN ASSISTANT

## 2019-10-26 PROCEDURE — 94660 CPAP INITIATION&MGMT: CPT

## 2019-10-26 RX ORDER — TORSEMIDE 20 MG/1
40 TABLET ORAL
Status: DISCONTINUED | OUTPATIENT
Start: 2019-10-26 | End: 2019-10-28

## 2019-10-26 RX ORDER — LIDOCAINE 50 MG/G
1 PATCH TOPICAL DAILY PRN
Status: DISCONTINUED | OUTPATIENT
Start: 2019-10-26 | End: 2019-10-26

## 2019-10-26 RX ORDER — LIDOCAINE 50 MG/G
3 PATCH TOPICAL DAILY PRN
Status: DISCONTINUED | OUTPATIENT
Start: 2019-10-26 | End: 2019-10-26

## 2019-10-26 RX ORDER — LIDOCAINE 50 MG/G
3 PATCH TOPICAL DAILY PRN
Status: DISCONTINUED | OUTPATIENT
Start: 2019-10-26 | End: 2019-10-28 | Stop reason: HOSPADM

## 2019-10-26 RX ADMIN — HEPARIN SODIUM 5000 UNITS: 5000 INJECTION INTRAVENOUS; SUBCUTANEOUS at 14:02

## 2019-10-26 RX ADMIN — GABAPENTIN 300 MG: 300 CAPSULE ORAL at 09:34

## 2019-10-26 RX ADMIN — METOPROLOL TARTRATE 25 MG: 25 TABLET, FILM COATED ORAL at 09:34

## 2019-10-26 RX ADMIN — TORSEMIDE 40 MG: 20 TABLET ORAL at 16:02

## 2019-10-26 RX ADMIN — NYSTATIN 1 APPLICATION: 100000 POWDER TOPICAL at 09:34

## 2019-10-26 RX ADMIN — HEPARIN SODIUM 5000 UNITS: 5000 INJECTION INTRAVENOUS; SUBCUTANEOUS at 06:48

## 2019-10-26 RX ADMIN — SERTRALINE HYDROCHLORIDE 25 MG: 50 TABLET ORAL at 21:27

## 2019-10-26 RX ADMIN — LEVOTHYROXINE SODIUM 112 MCG: 112 TABLET ORAL at 06:48

## 2019-10-26 RX ADMIN — LEVALBUTEROL HYDROCHLORIDE 1.25 MG: 1.25 SOLUTION, CONCENTRATE RESPIRATORY (INHALATION) at 20:50

## 2019-10-26 RX ADMIN — HEPARIN SODIUM 5000 UNITS: 5000 INJECTION INTRAVENOUS; SUBCUTANEOUS at 21:28

## 2019-10-26 RX ADMIN — PANTOPRAZOLE SODIUM 40 MG: 40 TABLET, DELAYED RELEASE ORAL at 06:48

## 2019-10-26 RX ADMIN — NYSTATIN 1 APPLICATION: 100000 POWDER TOPICAL at 17:20

## 2019-10-26 RX ADMIN — LEVALBUTEROL HYDROCHLORIDE 1.25 MG: 1.25 SOLUTION, CONCENTRATE RESPIRATORY (INHALATION) at 07:35

## 2019-10-26 RX ADMIN — METHOCARBAMOL 500 MG: 500 TABLET, FILM COATED ORAL at 16:06

## 2019-10-26 RX ADMIN — IPRATROPIUM BROMIDE 0.5 MG: 0.5 SOLUTION RESPIRATORY (INHALATION) at 14:24

## 2019-10-26 RX ADMIN — ACETAMINOPHEN 650 MG: 325 TABLET ORAL at 20:54

## 2019-10-26 RX ADMIN — IPRATROPIUM BROMIDE 0.5 MG: 0.5 SOLUTION RESPIRATORY (INHALATION) at 07:35

## 2019-10-26 RX ADMIN — LIDOCAINE 2 PATCH: 50 PATCH TOPICAL at 14:02

## 2019-10-26 RX ADMIN — GABAPENTIN 300 MG: 300 CAPSULE ORAL at 17:19

## 2019-10-26 RX ADMIN — IPRATROPIUM BROMIDE 0.5 MG: 0.5 SOLUTION RESPIRATORY (INHALATION) at 20:50

## 2019-10-26 RX ADMIN — TORSEMIDE 40 MG: 20 TABLET ORAL at 09:34

## 2019-10-26 RX ADMIN — LEVALBUTEROL HYDROCHLORIDE 1.25 MG: 1.25 SOLUTION, CONCENTRATE RESPIRATORY (INHALATION) at 14:24

## 2019-10-26 RX ADMIN — METOPROLOL TARTRATE 25 MG: 25 TABLET, FILM COATED ORAL at 20:53

## 2019-10-26 NOTE — ASSESSMENT & PLAN NOTE
Wt Readings from Last 3 Encounters:   10/26/19 82 6 kg (182 lb 1 6 oz)   10/21/19 87 5 kg (192 lb 14 4 oz)   10/17/19 86 2 kg (190 lb 0 6 oz)       Patient is diuresing well  Her weight is reducing however she still has bibasilar crackles noted  Will repeat a chest x-ray for further evaluation  Will also discontinue IV Lasix and place her on oral torsemide and adjust dose based on her chest x-ray result today and her intake and output    Continue to monitor electrolytes and daily weights

## 2019-10-26 NOTE — ASSESSMENT & PLAN NOTE
Lab Results   Component Value Date    HGBA1C 5 0 08/16/2019       Recent Labs     10/25/19  1104 10/25/19  1538 10/25/19  2028 10/26/19  0556   POCGLU 123 130 154* 103       Blood Sugar Average: Last 72 hrs:  (P) 151   Patient's blood sugars are well controlled   Discontinue insulin sliding scale and Accu-Cheks

## 2019-10-26 NOTE — NURSING NOTE
Patient is A+Ox3, VSS, denied pain all morning, but states she has discomfort to her shoulders and requests lidoderm patches  Patient had BM x 2 and is voiding adequately on BSC  Patient ambulates with walker and assist x 1  Patient is resting comfortably in bed, call bell in reach  Will continue to monitor closely

## 2019-10-26 NOTE — PROGRESS NOTES
Progress Note - Eliana Campo 1934, 80 y o  female MRN: [id number]    Unit/Bed#: 7T [facility] 711-02 Encounter: [id number]    Primary Care Provider: Ruy Rincon DO   Date and time admitted to hospital: 10/21/2019  5:07 PM        * Acute on chronic respiratory failure with hypoxia [facility])  Assessment & Plan  Patient had sudden flash pulmonary edema and acute on chronic diastolic CHF exacerbation  She is now on oxygen via nasal cannula during the daytime and placed on BiPAP during the evening following which she has improvement of her symptoms  Pulmonology has recommended home BiPAP for her for which testing and evaluations will continue    Acute on chronic diastolic heart failure [facility])  Assessment & Plan  Wt Readings from Last 3 Encounters:   10/26/19 82 6 kg (182 lb 1 6 oz)   10/21/19 87 5 kg (192 lb 14 4 oz)   10/17/19 86 2 kg (190 lb 0 6 oz)       Patient is diuresing well  Her weight is reducing however she still has bibasilar crackles noted  Will repeat a chest x-ray for further evaluation  Will also discontinue IV Lasix and place her on oral torsemide and adjust dose based on her chest x-ray result today and her intake and output  Continue to monitor electrolytes and daily weights      Morbid obesity ([address] )  Assessment & Plan  Patient's BMI is 36 76  Counseled on diet exercise and lifestyle modification    Type 2 MI (myocardial infarction) [facility])  Assessment & Plan  Secondary to demand ischemia due to acute on chronic diastolic CHF exacerbation    Continue medical management    Controlled type 2 diabetes mellitus with diabetic neuropathy, without long-term current use of insulin [facility])  Assessment & Plan  Lab Results   Component Value Date    HGBA1C 5 0 08/16/2019       Recent Labs     10/25/19  1104 10/25/19  1538 10/25/19  2028 10/26/19  0556   POCGLU 123 130 154* 103       Blood Sugar Average: Last 72 hrs:  (P) 151   Patient's blood sugars are well controlled   Discontinue insulin sliding scale and Accu-Cheks    AZUL (obstructive sleep apnea)  Assessment & Plan  Patient probably requires a BiPAP machine    SDH (subdural hematoma) ([facility])  Assessment & Plan  Patient recently developed a subdural hematoma after a fall which showed Minimal residual right parafalcine  hemorrhage in the inferior aspect of the falx  Anemia  Assessment & Plan  Patient has hemoglobin between 8-9      Acute-on-chronic kidney injury [facility])  Assessment & Plan  Patient's renal function is between 1 7-1 9  Avoid nephrotoxin agents  Avoid hypotension  Monitor electrolytes and renal function during diuresis  Off Heller catheter  Monitor voiding    Essential hypertension  Assessment & Plan  Patient's blood pressures are well controlled      VTE Pharmacologic Prophylaxis:   Pharmacologic: Heparin  Mechanical VTE Prophylaxis in Place: Yes    Patient Centered Rounds: I have performed bedside rounds with nursing staff today  Discussions with Specialists or Other Care Team Provider:  Discuss with Nephrology    Education and Discussions with Family / Patient:  Discussed with patient at bedside about hospital course    Time Spent for Care: 30 minutes  More than 50% of total time spent on counseling and coordination of care as described above  Current Length of Stay: 5 day(s)    Current Patient Status: Inpatient   Certification Statement: The patient will continue to require additional inpatient hospital stay due to Shortness of breath    Discharge Plan:  Discharge to acute rehab once authorization obtained  Code Status: Level 1 - Full Code      Subjective:   Patient is complaining of having a restless night where she felt her legs restless and also had difficulty sleeping due to her breathing and the machine    She states that she is feeling better this morning but a little tired    Objective:     Vitals:   Temp (24hrs), Av °F (36 1 °C), Min:96 8 °F (36 °C), Max:97 1 °F (36 2 °C)    Temp:  [96 8 °F (36 °C)-97 1 °F (36 2 °C)] 96 8 °F (36 °C)  HR:  [63-90] 90  Resp:  [18-20] 20  BP: (107-134)/(49-60) 112/60  SpO2:  [94 %-100 %] 98 %  Body mass index is 36 78 kg/m²  Input and Output Summary (last 24 hours): Intake/Output Summary (Last 24 hours) at 10/26/2019 0931  Last data filed at 10/26/2019 0850  Gross per 24 hour   Intake 540 ml   Output 1100 ml   Net -560 ml       Physical Exam:     Physical Exam   Constitutional: She is oriented to person, place, and time  She appears well-developed and well-nourished  HENT:   Head: Normocephalic and atraumatic  Right Ear: External ear normal    Left Ear: External ear normal    Mouth/Throat: Oropharynx is clear and moist    Eyes: Pupils are equal, round, and reactive to light  Conjunctivae and EOM are normal    Neck: Normal range of motion  Neck supple  Cardiovascular: Normal rate, regular rhythm and normal heart sounds  Pulmonary/Chest:   Shallow inspiratory effort  Decreased breath sounds bilateral bases with bibasilar crackles noted  Moderate air entry bilaterally    Hard to hear her lungs due to her body habitus and poor inspiratory effort   Abdominal: Soft  Bowel sounds are normal  She exhibits no mass  There is no tenderness  There is no rebound and no guarding  Genitourinary:   Genitourinary Comments: deferred   Musculoskeletal: She exhibits edema and tenderness  1+ nonpitting edema bilateral lower extremity   Neurological: She is alert and oriented to person, place, and time  She has normal reflexes  Cranial nerves 2-12 are normal   Normal neurological exam   Skin: Skin is warm and dry  No rash noted  Psychiatric: She has a normal mood and affect  Nursing note and vitals reviewed          Additional Data:     Labs:    Results from last 7 days   Lab Units 10/23/19  0451   WBC Thousand/uL 6 50   HEMOGLOBIN g/dL 8 1*   HEMATOCRIT % 24 1*   PLATELETS Thousands/uL 202   NEUTROS PCT % 66*   LYMPHS PCT % 23*   MONOS PCT % 8   EOS PCT % 2     Results from last 7 days   Lab Units 10/26/19  0641  10/22/19  0458   SODIUM mmol/L 136*   < > 133*   POTASSIUM mmol/L 4 1   < > 4 5   CHLORIDE mmol/L 93*   < > 94*   CO2 mmol/L 36*   < > 32*   BUN mg/dL 57*   < > 52*   CREATININE mg/dL 1 83*   < > 1 63*   ANION GAP mmol/L 7   < > 7   CALCIUM mg/dL 9 4   < > 8 7   ALBUMIN g/dL  --   --  3 4   TOTAL BILIRUBIN mg/dL  --   --  0 70   ALK PHOS U/L  --   --  72   ALT U/L  --   --  32   AST U/L  --   --  50*   GLUCOSE RANDOM mg/dL 105*   < > 80    < > = values in this interval not displayed  Results from last 7 days   Lab Units 10/21/19  1853   INR  0 94     Results from last 7 days   Lab Units 10/26/19  0556 10/25/19  2028 10/25/19  1538 10/25/19  1104 10/25/19  0554 10/24/19  2109 10/24/19  1555 10/24/19  1058 10/24/19  0538 10/23/19  2025 10/23/19  1536 10/23/19  1129   POC GLUCOSE mg/dl 103 154* 130 123 107 127 144* 221* 163* 163* 155* 262*         Results from last 7 days   Lab Units 10/22/19  0458 10/22/19  0059 10/21/19  1853 10/21/19  1852   LACTIC ACID mmol/L  --  1 0  --  1 8   PROCALCITONIN ng/ml 1 19*  --  0 29*  --            * I Have Reviewed All Lab Data Listed Above  * Additional Pertinent Lab Tests Reviewed: [address] Admission Reviewed    Imaging:    Imaging Reports Reviewed Today Include:  Chest x-ray  Imaging Personally Reviewed by Myself Includes:  Chest x-ray    Recent Cultures (last 7 days):     Results from last 7 days   Lab Units 10/21/19  2059 10/21/19  1909   BLOOD CULTURE   --  No Growth After 4 Days     LEGIONELLA URINARY ANTIGEN  Negative  --        Last 24 Hours Medication List:     Current Facility-Administered Medications:  acetaminophen 650 mg Oral Q6H PRN NATHEN Awan   amLODIPine 2 5 mg Oral Daily Sejal Burkett MD   gabapentin 300 mg Oral BID NATHEN Awan   heparin (porcine) 5,000 Units Subcutaneous [facility] NATHEN Awan   HYDROcodone-acetaminophen 1 tablet Oral Q6H PRN NATHEN Awan   HYDROcodone-acetaminophen 2 tablet Oral Q6H PRN Sahil Elkin, CRNP   ipratropium 0 5 mg Nebulization Q6H [place] Elkin, CRNP   levalbuterol 1 25 mg Nebulization Q6H Sahil Elkin, CRNP   levothyroxine 112 mcg Oral Daily Sahil Elkin, CRNP   methocarbamol 500 mg Oral Q6H PRN Kylie Zuniga MD   metoprolol tartrate 25 mg Oral Q12H [address] Sahil Elkin, CRNP   nystatin  Topical BID [place] Elkin, CRNP   pantoprazole 40 mg Oral Early Morning Sahil Elkin, CRNP   polyethylene glycol 17 g Oral Daily PRN Sahil Elkin, CRNP   sertraline 25 mg Oral HS Sahil Elkin, CRNP   torsemide 40 mg Oral BID (diuretic) Kylie Zuniga MD        Today, Patient Was Seen By: Kylie Zuniga MD    ** Please Note: Dictation voice to text software may have been used in the creation of this document   **

## 2019-10-26 NOTE — PROGRESS NOTES
NEPHROLOGY PROGRESS NOTE   Diana Price 80 y o  female MRN: [id number]  Unit/Bed#: 7T [facility] 711-02 Encounter: [id number]  Reason for Consult: HERLINDA/CKD    ASSESSMENT/PLAN:  1  Acute Kidney Injury, POA- secondary to hypotension with volume overload  - creatinine has been slowly improving with diuresis  - changed from IV lasix to oral torsemide 40mg BID today  - will titrate oral diuretics as appropriate  - continue to follow creatinine  - electrolytes acceptable  2  Chronic Kidney Disease stage III- secondary to hypertensive nephrosclerosis with known renal artery stenosis  - previously followed by Dr Ghazala Mendoza in 2015  - baseline creatinine is 1 5-1 6  3  Diastolic CHF- changed from IV lasix to oral torsemide 40mg BID  - EF 41%  - will adjust as needed  - weight improving  - urine output appropriate  4  Hypertension- controlled  5  Anemia- monitor hgb  6  Subdural hematoma  7  Hyponatremia- continue diuresis, fluid restriction    SUBJECTIVE:  Patient states breathing is so-so  She states no acute changes in urination  Having loose stools past few days      OBJECTIVE:  Current Weight: Weight - Scale: 82 6 kg (182 lb 1 6 oz)  Vitals:    10/26/19 0600 10/26/19 0730 10/26/19 0932 10/26/19 1426   BP:  112/60 122/53    BP Location:  Left arm Right arm    Pulse:  90 69 77   Resp:  20 20 20   Temp:  (!) 96 8 °F (36 °C)     TempSrc:  Temporal     SpO2:  98%  97%   Weight: 82 6 kg (182 lb 1 6 oz)      Height:           Intake/Output Summary (Last 24 hours) at 10/26/2019 1438  Last data filed at 10/26/2019 1348  Gross per 24 hour   Intake 420 ml   Output 1500 ml   Net -1080 ml     General: NAD  Skin: no rash  HEENT: normocphalic  Neck: no JVD  Lungs: decreased with basilar crackles  Cardiac: RRR  Extremities: trace edema  GI: soft nt nd  Neuro: alert awake  Psych: mood and affect appropriate    Medications:    Current Facility-Administered Medications:     acetaminophen (TYLENOL) tablet 650 mg, 650 mg, Oral, Q6H PRN, Filiberto Arriaga CRNP, 650 mg at 10/25/19 2342    amLODIPine (NORVASC) tablet 2 5 mg, 2 5 mg, Oral, Daily, Donte Marks MD, 2 5 mg at 10/24/19 0954    gabapentin (NEURONTIN) capsule 300 mg, 300 mg, Oral, BID, Filiberto Bart, CRNP, 300 mg at 10/26/19 2347    heparin (porcine) subcutaneous injection 5,000 Units, 5,000 Units, Subcutaneous, Q8H [address], 5,000 Units at 10/26/19 1402 **AND** [CANCELED] Platelet count, , , Once, Levon Franks PA-C    HYDROcodone-acetaminophen (NORCO) 5-325 mg per tablet 1 tablet, 1 tablet, Oral, Q6H PRN, Filiberto Bart, CRNP    HYDROcodone-acetaminophen (NORCO) 5-325 mg per tablet 2 tablet, 2 tablet, Oral, Q6H PRN, Filiberto Bart, CRNP    ipratropium (ATROVENT) 0 02 % inhalation solution 0 5 mg, 0 5 mg, Nebulization, Q6H, Filiberto Bart, CRNP, 0 5 mg at 10/26/19 1424    levalbuterol (XOPENEX) inhalation solution 1 25 mg, 1 25 mg, Nebulization, Q6H, Filiberto Bart, CRNP, 1 25 mg at 10/26/19 1424    levothyroxine tablet 112 mcg, 112 mcg, Oral, Daily, Filiberto Bart, CRNP, 112 mcg at 10/26/19 0648    lidocaine (LIDODERM) 5 % patch 3 patch, 3 patch, Topical, Daily PRN, Mavis Bose MD    methocarbamol (ROBAXIN) tablet 500 mg, 500 mg, Oral, Q6H PRN, Mavis Bose MD, 500 mg at 10/25/19 1741    metoprolol tartrate (LOPRESSOR) tablet 25 mg, 25 mg, Oral, Q12H [address], Filiberto Bart, CRNP, 25 mg at 10/26/19 1336    nystatin (MYCOSTATIN) powder, , Topical, BID, Filiberto Bart, CRNP, 1 application at 23/68/82 0934    pantoprazole (PROTONIX) EC tablet 40 mg, 40 mg, Oral, Early Morning, Filiberto Bart, CRNP, 40 mg at 10/26/19 2245    polyethylene glycol (MIRALAX) packet 17 g, 17 g, Oral, Daily PRN, Filiberto Bart, CRNP, 17 g at 10/25/19 3909    sertraline (ZOLOFT) tablet 25 mg, 25 mg, Oral, HS, Filiberto Bart, CRNP, 25 mg at 10/25/19 2148    torsemide (DEMADEX) tablet 40 mg, 40 mg, Oral, BID (diuretic), Mavis Bose MD, 40 mg at 10/26/19 0698    Laboratory Results:  Results from last 7 days   Lab Units 10/26/19  0641 10/25/19  7441 10/24/19  0428 10/23/19  0451 10/22/19  1242 10/22/19  0458 10/21/19  2200 10/21/19  1853 10/21/19  0533   WBC Thousand/uL  --   --   --  6 50 6 20 6 90  --  11 80*  --    HEMOGLOBIN g/dL  --   --   --  8 1* 8 0* 7 9*  --  9 7* 8 8*   HEMATOCRIT %  --   --   --  24 1* 24 0* 23 4*  --  30 1* 26 4*   PLATELETS Thousands/uL  --   --   --  202 195 204  --  261  --    POTASSIUM mmol/L 4 1 4 3 3 7 4 1  --  4 5 4 9 5 2* 4 3   CHLORIDE mmol/L 93* 95* 96* 95*  --  94*  --  91* 94*   CO2 mmol/L 36* 37* 34* 33*  --  32*  --  30 32*   BUN mg/dL 57* 55* 52* 51*  --  52*  --  48* 46*   CREATININE mg/dL 1 83* 1 69* 1 78* 1 84*  --  1 63*  --  1 90* 1 41*   CALCIUM mg/dL 9 4 9 1 8 8 8 9  --  8 7  --  9 4 9 1   MAGNESIUM mg/dL  --   --   --  2 3  --  2 1  --  2 4*  --    PHOSPHORUS mg/dL  --   --   --  4 4  --  5 9*  --  7 9* 4 0

## 2019-10-26 NOTE — NURSING NOTE
Pt alert and oreinted times four  Didn't sleep very well  Last   Had a shower  Call bell with in reach, continue to monitor

## 2019-10-26 NOTE — SOCIAL WORK
Per [facility] liaison admission auth not yet received  They have made mult  Called to [facility] regarding   [employer] has not yet made decision on approval

## 2019-10-27 LAB
ANION GAP SERPL CALCULATED.3IONS-SCNC: 7 MMOL/L (ref 5–14)
BUN SERPL-MCNC: 60 MG/DL (ref 5–25)
CALCIUM SERPL-MCNC: 9.1 MG/DL (ref 8.4–10.2)
CHLORIDE SERPL-SCNC: 93 MMOL/L (ref 97–108)
CO2 SERPL-SCNC: 36 MMOL/L (ref 22–30)
CREAT SERPL-MCNC: 1.9 MG/DL (ref 0.6–1.2)
GFR SERPL CREATININE-BSD FRML MDRD: 24 ML/MIN/1.73SQ M
GLUCOSE SERPL-MCNC: 121 MG/DL (ref 65–140)
GLUCOSE SERPL-MCNC: 99 MG/DL (ref 70–99)
MAGNESIUM SERPL-MCNC: 2.1 MG/DL (ref 1.6–2.3)
POTASSIUM SERPL-SCNC: 4 MMOL/L (ref 3.6–5)
SODIUM SERPL-SCNC: 136 MMOL/L (ref 137–147)

## 2019-10-27 PROCEDURE — 94640 AIRWAY INHALATION TREATMENT: CPT

## 2019-10-27 PROCEDURE — 83735 ASSAY OF MAGNESIUM: CPT | Performed by: FAMILY MEDICINE

## 2019-10-27 PROCEDURE — 94660 CPAP INITIATION&MGMT: CPT

## 2019-10-27 PROCEDURE — 94760 N-INVAS EAR/PLS OXIMETRY 1: CPT

## 2019-10-27 PROCEDURE — 99232 SBSQ HOSP IP/OBS MODERATE 35: CPT | Performed by: PHYSICIAN ASSISTANT

## 2019-10-27 PROCEDURE — 80048 BASIC METABOLIC PNL TOTAL CA: CPT | Performed by: FAMILY MEDICINE

## 2019-10-27 PROCEDURE — 99232 SBSQ HOSP IP/OBS MODERATE 35: CPT | Performed by: FAMILY MEDICINE

## 2019-10-27 PROCEDURE — 82948 REAGENT STRIP/BLOOD GLUCOSE: CPT

## 2019-10-27 RX ORDER — GABAPENTIN 300 MG/1
300 CAPSULE ORAL 3 TIMES DAILY
Status: DISCONTINUED | OUTPATIENT
Start: 2019-10-27 | End: 2019-10-28 | Stop reason: HOSPADM

## 2019-10-27 RX ADMIN — GABAPENTIN 300 MG: 300 CAPSULE ORAL at 09:14

## 2019-10-27 RX ADMIN — METHOCARBAMOL 500 MG: 500 TABLET, FILM COATED ORAL at 13:48

## 2019-10-27 RX ADMIN — NYSTATIN 1 APPLICATION: 100000 POWDER TOPICAL at 09:15

## 2019-10-27 RX ADMIN — GABAPENTIN 300 MG: 300 CAPSULE ORAL at 21:46

## 2019-10-27 RX ADMIN — LEVALBUTEROL HYDROCHLORIDE 1.25 MG: 1.25 SOLUTION, CONCENTRATE RESPIRATORY (INHALATION) at 07:22

## 2019-10-27 RX ADMIN — IPRATROPIUM BROMIDE 0.5 MG: 0.5 SOLUTION RESPIRATORY (INHALATION) at 19:07

## 2019-10-27 RX ADMIN — HEPARIN SODIUM 5000 UNITS: 5000 INJECTION INTRAVENOUS; SUBCUTANEOUS at 13:47

## 2019-10-27 RX ADMIN — GABAPENTIN 300 MG: 300 CAPSULE ORAL at 15:19

## 2019-10-27 RX ADMIN — TORSEMIDE 40 MG: 20 TABLET ORAL at 15:19

## 2019-10-27 RX ADMIN — ACETAMINOPHEN 650 MG: 325 TABLET ORAL at 21:30

## 2019-10-27 RX ADMIN — METOPROLOL TARTRATE 25 MG: 25 TABLET, FILM COATED ORAL at 09:14

## 2019-10-27 RX ADMIN — LEVOTHYROXINE SODIUM 112 MCG: 112 TABLET ORAL at 06:31

## 2019-10-27 RX ADMIN — IPRATROPIUM BROMIDE 0.5 MG: 0.5 SOLUTION RESPIRATORY (INHALATION) at 14:05

## 2019-10-27 RX ADMIN — NYSTATIN 1 APPLICATION: 100000 POWDER TOPICAL at 17:27

## 2019-10-27 RX ADMIN — LEVALBUTEROL HYDROCHLORIDE 1.25 MG: 1.25 SOLUTION, CONCENTRATE RESPIRATORY (INHALATION) at 14:05

## 2019-10-27 RX ADMIN — HEPARIN SODIUM 5000 UNITS: 5000 INJECTION INTRAVENOUS; SUBCUTANEOUS at 09:14

## 2019-10-27 RX ADMIN — LIDOCAINE 2 PATCH: 50 PATCH TOPICAL at 13:22

## 2019-10-27 RX ADMIN — HEPARIN SODIUM 5000 UNITS: 5000 INJECTION INTRAVENOUS; SUBCUTANEOUS at 21:46

## 2019-10-27 RX ADMIN — LEVALBUTEROL HYDROCHLORIDE 1.25 MG: 1.25 SOLUTION, CONCENTRATE RESPIRATORY (INHALATION) at 19:07

## 2019-10-27 RX ADMIN — IPRATROPIUM BROMIDE 0.5 MG: 0.5 SOLUTION RESPIRATORY (INHALATION) at 07:22

## 2019-10-27 RX ADMIN — TORSEMIDE 40 MG: 20 TABLET ORAL at 09:14

## 2019-10-27 RX ADMIN — SERTRALINE HYDROCHLORIDE 25 MG: 50 TABLET ORAL at 21:46

## 2019-10-27 RX ADMIN — PANTOPRAZOLE SODIUM 40 MG: 40 TABLET, DELAYED RELEASE ORAL at 06:31

## 2019-10-27 NOTE — PROGRESS NOTES
Progress Note - Milagro Colón 1934, 80 y o  female MRN: [id number]    Unit/Bed#: 7T [facility] 711-02 Encounter: [id number]    Primary Care Provider: Earl Bourne DO   Date and time admitted to hospital: 10/21/2019  5:07 PM        * Acute on chronic respiratory failure with hypoxia [facility])  Assessment & Plan  Patient had sudden flash pulmonary edema and acute on chronic diastolic CHF exacerbation  She is now on oxygen via nasal cannula during the daytime and placed on BiPAP during the evening following which she has improvement of her symptoms  Pulmonology has recommended home BiPAP for her for which testing and evaluations will continue    Acute on chronic diastolic heart failure [facility])  Assessment & Plan  Wt Readings from Last 3 Encounters:   10/27/19 81 9 kg (180 lb 8 9 oz)   10/21/19 87 5 kg (192 lb 14 4 oz)   10/17/19 86 2 kg (190 lb 0 6 oz)       Patient is diuresing well  Her weight is reducing however she still has decreased breath sounds bilateral bases  Will repeat a chest x-ray for further evaluation  continue IV Lasix and place her on oral torsemide and adjust dose based on her intake and output  Continue to monitor electrolytes and daily weights      Morbid obesity ([address] )  Assessment & Plan  Patient's BMI is 36 76  Counseled on diet exercise and lifestyle modification    Type 2 MI (myocardial infarction) [facility])  Assessment & Plan  Secondary to demand ischemia due to acute on chronic diastolic CHF exacerbation    Continue medical management    Controlled type 2 diabetes mellitus with diabetic neuropathy, without long-term current use of insulin [facility])  Assessment & Plan  Lab Results   Component Value Date    HGBA1C 5 0 08/16/2019       Recent Labs     10/25/19  1538 10/25/19  2028 10/26/19  0556 10/27/19  0606   POCGLU 130 154* 103 121       Blood Sugar Average: Last 72 hrs:  (P) 139 3   Patient's blood sugars are well controlled   Discontinue insulin sliding scale and Accu-Cheks    AZUL (obstructive sleep apnea)  Assessment & Plan  Patient probably requires a BiPAP machine    SDH (subdural hematoma) (HCC)  Assessment & Plan  Patient recently developed a subdural hematoma after a fall which showed Minimal residual right parafalcine  hemorrhage in the inferior aspect of the falx  Chronic anemia  Assessment & Plan  Patient has hemoglobin between 8-9      Acute-on-chronic kidney injury [facility])  Assessment & Plan  Patient's renal function is between 1 7-1 9  Avoid nephrotoxin agents  Avoid hypotension  Monitor electrolytes and renal function during diuresis  Off Heller catheter  Monitor voiding    Essential hypertension  Assessment & Plan  Patient's blood pressures are well controlled      VTE Pharmacologic Prophylaxis:   Pharmacologic: Heparin  Mechanical VTE Prophylaxis in Place: Yes    Patient Centered Rounds: I have performed bedside rounds with nursing staff today  Discussions with Specialists or Other Care Team Provider:  Will discuss with Nephrology    Education and Discussions with Family / Patient:  Discussed with patient at bedside about hospital course    Time Spent for Care: 30 minutes  More than 50% of total time spent on counseling and coordination of care as described above  Current Length of Stay: 6 day(s)    Current Patient Status: Inpatient   Certification Statement: The patient will continue to require additional inpatient hospital stay due to sob    Discharge Plan:  Pending progress    Code Status: Level 1 - Full Code      Subjective:   Patient states that she felt better and slept last night  Still complains of shortness of breath with activity  The swelling on the feet noted  Objective:     Vitals:   Temp (24hrs), Av 7 °F (36 5 °C), Min:97 1 °F (36 2 °C), Max:98 2 °F (36 8 °C)    Temp:  [97 1 °F (36 2 °C)-98 2 °F (36 8 °C)] 98 2 °F (36 8 °C)  HR:  [61-77] 68  Resp:  [18-20] 20  BP: (110-126)/(51-67) 126/67  SpO2:  [97 %-100 %] 100 %  Body mass index is 36 47 kg/m²       Input and Output Summary (last 24 hours): Intake/Output Summary (Last 24 hours) at 10/27/2019 1017  Last data filed at 10/27/2019 0710  Gross per 24 hour   Intake 660 ml   Output 850 ml   Net -190 ml       Physical Exam:     Physical Exam   Constitutional: She is oriented to person, place, and time  She appears well-developed and well-nourished  HENT:   Head: Normocephalic and atraumatic  Right Ear: External ear normal    Left Ear: External ear normal    Mouth/Throat: Oropharynx is clear and moist    Eyes: Pupils are equal, round, and reactive to light  Conjunctivae and EOM are normal    Neck: Normal range of motion  Neck supple  Cardiovascular: Normal rate, regular rhythm and normal heart sounds  Pulmonary/Chest: Effort normal    Decreased air entry bilaterally on the bases and moderate air entry bilaterally   Abdominal: Soft  Bowel sounds are normal  She exhibits no mass  There is no tenderness  There is no rebound and no guarding  Genitourinary:   Genitourinary Comments: deferred   Musculoskeletal: She exhibits edema  Neurological: She is alert and oriented to person, place, and time  She has normal reflexes  Cranial nerves 2-12 are normal   Normal neurological exam   Skin: Skin is warm and dry  No rash noted  Psychiatric: She has a normal mood and affect  Nursing note and vitals reviewed          Additional Data:     Labs:    Results from last 7 days   Lab Units 10/23/19  0451   WBC Thousand/uL 6 50   HEMOGLOBIN g/dL 8 1*   HEMATOCRIT % 24 1*   PLATELETS Thousands/uL 202   NEUTROS PCT % 66*   LYMPHS PCT % 23*   MONOS PCT % 8   EOS PCT % 2     Results from last 7 days   Lab Units 10/27/19  0446  10/22/19  0458   SODIUM mmol/L 136*   < > 133*   POTASSIUM mmol/L 4 0   < > 4 5   CHLORIDE mmol/L 93*   < > 94*   CO2 mmol/L 36*   < > 32*   BUN mg/dL 60*   < > 52*   CREATININE mg/dL 1 90*   < > 1 63*   ANION GAP mmol/L 7   < > 7   CALCIUM mg/dL 9 1   < > 8 7   ALBUMIN g/dL  --   --  3 4   TOTAL BILIRUBIN mg/dL  --   --  0 70   ALK PHOS U/L  --   --  72   ALT U/L  --   --  32   AST U/L  --   --  50*   GLUCOSE RANDOM mg/dL 99   < > 80    < > = values in this interval not displayed  Results from last 7 days   Lab Units 10/21/19  1853   INR  0 94     Results from last 7 days   Lab Units 10/27/19  0606 10/26/19  0556 10/25/19  2028 10/25/19  1538 10/25/19  1104 10/25/19  0554 10/24/19  2109 10/24/19  1555 10/24/19  1058 10/24/19  0538 10/23/19  2025 10/23/19  1536   POC GLUCOSE mg/dl 121 103 154* 130 123 107 127 144* 221* 163* 163* 155*         Results from last 7 days   Lab Units 10/22/19  0458 10/22/19  0059 10/21/19  1853 10/21/19  1852   LACTIC ACID mmol/L  --  1 0  --  1 8   PROCALCITONIN ng/ml 1 19*  --  0 29*  --            * I Have Reviewed All Lab Data Listed Above  * Additional Pertinent Lab Tests Reviewed: [address] Admission Reviewed    Imaging:    Imaging Reports Reviewed Today Include: cxray  Imaging Personally Reviewed by Myself Includes:  cxray    Recent Cultures (last 7 days):     Results from last 7 days   Lab Units 10/21/19  2059 10/21/19  1909   BLOOD CULTURE   --  No Growth After 5 Days     LEGIONELLA URINARY ANTIGEN  Negative  --        Last 24 Hours Medication List:     Current Facility-Administered Medications:  acetaminophen 650 mg Oral Q6H PRN Deshawn Cagleish, LISSETTENP   amLODIPine 2 5 mg Oral Daily Fredrick Lomeli MD   gabapentin 300 mg Oral BID Donecesra Lee, NATHEN   heparin (porcine) 5,000 Units Subcutaneous [facility] Deshawn Cagleish, NATHEN   HYDROcodone-acetaminophen 1 tablet Oral Q6H PRN Donecesar Cagleish, CRNP   HYDROcodone-acetaminophen 2 tablet Oral Q6H PRN Donecesar Cagleish, CRMONET   ipratropium 0 5 mg Nebulization Q6H Doneen Tseringish, CRNP   levalbuterol 1 25 mg Nebulization Q6H Doneen Tseringish, CRMONET   levothyroxine 112 mcg Oral Daily Donecesar Lee, LISSETTENP   lidocaine 3 patch Topical Daily PRN Elicia Baker MD   methocarbamol 500 mg Oral Q6H PRN Elicia Baker MD   metoprolol tartrate 25 mg Oral Q12H [facility] Fred Mireles, CRNP   nystatin  Topical BID Fred Mireles, CRNP   pantoprazole 40 mg Oral Early Morning Fred Mireles, CRNP   polyethylene glycol 17 g Oral Daily PRN Ken Mireles, CRNP   sertraline 25 mg Oral HS Fred Mireles, CRNP   torsemide 40 mg Oral BID (diuretic) Ronald Mcdonough MD        Today, Patient Was Seen By: Ronald Mcdonough MD    ** Please Note: Dictation voice to text software may have been used in the creation of this document   **

## 2019-10-27 NOTE — ASSESSMENT & PLAN NOTE
Wt Readings from Last 3 Encounters:   10/27/19 81 9 kg (180 lb 8 9 oz)   10/21/19 87 5 kg (192 lb 14 4 oz)   10/17/19 86 2 kg (190 lb 0 6 oz)       Patient is diuresing well  Her weight is reducing however she still has decreased breath sounds bilateral bases  Will repeat a chest x-ray for further evaluation  continue IV Lasix and place her on oral torsemide and adjust dose based on her intake and output    Continue to monitor electrolytes and daily weights

## 2019-10-27 NOTE — PROGRESS NOTES
NEPHROLOGY PROGRESS NOTE   Candice Copeland 80 y o  female MRN: [id number]  Unit/Bed#: 7T [facility] 711-02 Encounter: [id number]  Reason for Consult: HERLINDA/CKD    ASSESSMENT/PLAN:  1  Acute Kidney Injury, POA- secondary to hypotension with volume overload  - creatinine has been slowly improving with diuresis  - changed from IV lasix to oral torsemide 40mg BID today  - will titrate oral diuretics as appropriate  - continue to follow creatinine  - electrolytes acceptable  2  Chronic Kidney Disease stage III- secondary to hypertensive nephrosclerosis with known renal artery stenosis  - previously followed by Dr Michael Danielson in 2015  - baseline creatinine is 1 5-1 6  - may have higher baseline creatinine for euvolemia  - would like to follow up with our group on discharge  3  Diastolic CHF- changed from IV lasix to oral torsemide 40mg BID  - EF 41%  - will adjust as needed  - weight improving  - urine output appropriate  4  Hypertension- controlled  - renal artery stenosis per daughter  - primary team ordering MRA to evaluate  5  Anemia- monitor hgb  6  Subdural hematoma  7  Hyponatremia- continue diuresis, fluid restriction    Disposition:  Continue torsemide 40mg BID    SUBJECTIVE:  Patient states breathing is good today  Good urine output  No retention  Family at bedside  Daughter states renal artery stenosis with unilateral stent placement for 90% blockage but no stent placed for 80% blockage on other side      OBJECTIVE:  Current Weight: Weight - Scale: 81 9 kg (180 lb 8 9 oz)  Vitals:    10/26/19 2053 10/26/19 2329 10/27/19 0600 10/27/19 0710   BP: 121/51 110/57  126/67   BP Location:  Right arm  Left arm   Pulse: 70 61  68   Resp:  18  20   Temp:  98 °F (36 7 °C)  98 2 °F (36 8 °C)   TempSrc:  Temporal  Temporal   SpO2:  97%  100%   Weight:   81 9 kg (180 lb 8 9 oz)    Height:           Intake/Output Summary (Last 24 hours) at 10/27/2019 1326  Last data filed at 10/27/2019 1303  Gross per 24 hour   Intake 720 ml   Output 850 ml   Net -130 ml     General: NAD  Skin: no rash  HEENT: normocephalic  Neck: supple  Lungs: CTAB  Cardiac: RRR  Extremities:   GI: soft nt nd  Neuro: alert awake  Psych: mood and affect appropriate    Medications:    Current Facility-Administered Medications:     acetaminophen (TYLENOL) tablet 650 mg, 650 mg, Oral, Q6H PRN, Sahil Elkin, CRNP, 650 mg at 10/26/19 2054    amLODIPine (NORVASC) tablet 2 5 mg, 2 5 mg, Oral, Daily, Adele Pabon MD, 2 5 mg at 10/24/19 0954    gabapentin (NEURONTIN) capsule 300 mg, 300 mg, Oral, BID, [place] Elkin, CRNP, 300 mg at 10/27/19 3515    heparin (porcine) subcutaneous injection 5,000 Units, 5,000 Units, Subcutaneous, Q8H [address], 5,000 Units at 10/27/19 0914 **AND** [CANCELED] Platelet count, , , Once, Levon Franks PA-C    HYDROcodone-acetaminophen (NORCO) 5-325 mg per tablet 1 tablet, 1 tablet, Oral, Q6H PRN, Sahil Elkin, CRNP    HYDROcodone-acetaminophen (NORCO) 5-325 mg per tablet 2 tablet, 2 tablet, Oral, Q6H PRN, Sahil Elkin, CRNP    ipratropium (ATROVENT) 0 02 % inhalation solution 0 5 mg, 0 5 mg, Nebulization, Q6H, [place] Elkin, CRNP, 0 5 mg at 10/27/19 8043    levalbuterol (XOPENEX) inhalation solution 1 25 mg, 1 25 mg, Nebulization, Q6H, Sahil Elkin, CRNP, 1 25 mg at 10/27/19 9115    levothyroxine tablet 112 mcg, 112 mcg, Oral, Daily, Sahil Elkin, CRNP, 112 mcg at 10/27/19 0631    lidocaine (LIDODERM) 5 % patch 3 patch, 3 patch, Topical, Daily PRN, Kylie Zuniga MD, 2 patch at 10/27/19 1322    methocarbamol (ROBAXIN) tablet 500 mg, 500 mg, Oral, Q6H PRN, Kylie Zuniga MD, 500 mg at 10/26/19 1606    metoprolol tartrate (LOPRESSOR) tablet 25 mg, 25 mg, Oral, Q12H [address], NATHEN Tolentino, 25 mg at 10/27/19 8163    nystatin (MYCOSTATIN) powder, , Topical, BID, NATHEN Tolentino, 1 application at 22/05/53 0915    pantoprazole (PROTONIX) EC tablet 40 mg, 40 mg, Oral, Early Morning, NATHEN Tolentino, 40 mg at 10/27/19 0631    polyethylene glycol (MIRALAX) packet 17 g, 17 g, Oral, Daily PRN, NATHEN Zarate, 17 g at 10/25/19 2334    sertraline (ZOLOFT) tablet 25 mg, 25 mg, Oral, HS, NATHEN Zarate, 25 mg at 10/26/19 2127    torsemide [facility]) tablet 40 mg, 40 mg, Oral, BID (diuretic), Misty Robles MD, 40 mg at 10/27/19 0914    Laboratory Results:  Results from last 7 days   Lab Units 10/27/19  0446 10/26/19  0641 10/25/19  0457 10/24/19  0428 10/23/19  0451 10/22/19  1242 10/22/19  0458 10/21/19  2200 10/21/19  1853 10/21/19  0533   WBC Thousand/uL  --   --   --   --  6 50 6 20 6 90  --  11 80*  --    HEMOGLOBIN g/dL  --   --   --   --  8 1* 8 0* 7 9*  --  9 7* 8 8*   HEMATOCRIT %  --   --   --   --  24 1* 24 0* 23 4*  --  30 1* 26 4*   PLATELETS Thousands/uL  --   --   --   --  202 195 204  --  261  --    POTASSIUM mmol/L 4 0 4 1 4 3 3 7 4 1  --  4 5 4 9 5 2* 4 3   CHLORIDE mmol/L 93* 93* 95* 96* 95*  --  94*  --  91* 94*   CO2 mmol/L 36* 36* 37* 34* 33*  --  32*  --  30 32*   BUN mg/dL 60* 57* 55* 52* 51*  --  52*  --  48* 46*   CREATININE mg/dL 1 90* 1 83* 1 69* 1 78* 1 84*  --  1 63*  --  1 90* 1 41*   CALCIUM mg/dL 9 1 9 4 9 1 8 8 8 9  --  8 7  --  9 4 9 1   MAGNESIUM mg/dL 2 1  --   --   --  2 3  --  2 1  --  2 4*  --    PHOSPHORUS mg/dL  --   --   --   --  4 4  --  5 9*  --  7 9* 4 0

## 2019-10-27 NOTE — ASSESSMENT & PLAN NOTE
Lab Results   Component Value Date    HGBA1C 5 0 08/16/2019       Recent Labs     10/25/19  1538 10/25/19  2028 10/26/19  0556 10/27/19  0606   POCGLU 130 154* 103 121       Blood Sugar Average: Last 72 hrs:  (P) 139 3   Patient's blood sugars are well controlled   Discontinue insulin sliding scale and Accu-Cheks

## 2019-10-28 ENCOUNTER — HOSPITAL ENCOUNTER (INPATIENT)
Facility: HOSPITAL | Age: 84
LOS: 9 days | Discharge: HOME HEALTH | DRG: 949 | End: 2019-11-06
Attending: PHYSICAL MEDICINE & REHABILITATION | Admitting: PHYSICAL MEDICINE & REHABILITATION
Payer: COMMERCIAL

## 2019-10-28 ENCOUNTER — APPOINTMENT (INPATIENT)
Dept: MRI IMAGING | Facility: HOSPITAL | Age: 84
DRG: 280 | End: 2019-10-28
Payer: COMMERCIAL

## 2019-10-28 VITALS
HEART RATE: 69 BPM | DIASTOLIC BLOOD PRESSURE: 57 MMHG | SYSTOLIC BLOOD PRESSURE: 116 MMHG | HEIGHT: 59 IN | WEIGHT: 167.55 LBS | TEMPERATURE: 97.8 F | BODY MASS INDEX: 33.78 KG/M2 | RESPIRATION RATE: 18 BRPM | OXYGEN SATURATION: 95 %

## 2019-10-28 DIAGNOSIS — S06.5X9A SDH (SUBDURAL HEMATOMA) (HCC): ICD-10-CM

## 2019-10-28 DIAGNOSIS — N17.9 ACUTE-ON-CHRONIC KIDNEY INJURY (HCC): ICD-10-CM

## 2019-10-28 DIAGNOSIS — G62.9 NEUROPATHY: ICD-10-CM

## 2019-10-28 DIAGNOSIS — R06.2 WHEEZING: ICD-10-CM

## 2019-10-28 DIAGNOSIS — Z86.73 H/O: CVA (CEREBROVASCULAR ACCIDENT): Chronic | ICD-10-CM

## 2019-10-28 DIAGNOSIS — J96.21 ACUTE ON CHRONIC RESPIRATORY FAILURE WITH HYPOXIA (HCC): ICD-10-CM

## 2019-10-28 DIAGNOSIS — G47.33 OSA (OBSTRUCTIVE SLEEP APNEA): Chronic | ICD-10-CM

## 2019-10-28 DIAGNOSIS — I50.33 ACUTE ON CHRONIC DIASTOLIC HEART FAILURE (HCC): Primary | ICD-10-CM

## 2019-10-28 DIAGNOSIS — I10 ESSENTIAL HYPERTENSION: Chronic | ICD-10-CM

## 2019-10-28 DIAGNOSIS — N18.9 ACUTE-ON-CHRONIC KIDNEY INJURY (HCC): ICD-10-CM

## 2019-10-28 PROBLEM — I70.1 RENAL ARTERY STENOSIS (HCC): Chronic | Status: ACTIVE | Noted: 2017-02-08

## 2019-10-28 LAB
ANION GAP SERPL CALCULATED.3IONS-SCNC: 8 MMOL/L (ref 5–14)
BUN SERPL-MCNC: 63 MG/DL (ref 5–25)
CALCIUM SERPL-MCNC: 8.9 MG/DL (ref 8.4–10.2)
CHLORIDE SERPL-SCNC: 90 MMOL/L (ref 97–108)
CO2 SERPL-SCNC: 37 MMOL/L (ref 22–30)
CREAT SERPL-MCNC: 1.95 MG/DL (ref 0.6–1.2)
GFR SERPL CREATININE-BSD FRML MDRD: 23 ML/MIN/1.73SQ M
GLUCOSE SERPL-MCNC: 91 MG/DL (ref 70–99)
POTASSIUM SERPL-SCNC: 3.7 MMOL/L (ref 3.6–5)
SODIUM SERPL-SCNC: 135 MMOL/L (ref 137–147)

## 2019-10-28 PROCEDURE — 94640 AIRWAY INHALATION TREATMENT: CPT

## 2019-10-28 PROCEDURE — 97116 GAIT TRAINING THERAPY: CPT

## 2019-10-28 PROCEDURE — NC001 PR NO CHARGE

## 2019-10-28 PROCEDURE — 99239 HOSP IP/OBS DSCHRG MGMT >30: CPT | Performed by: FAMILY MEDICINE

## 2019-10-28 PROCEDURE — 94660 CPAP INITIATION&MGMT: CPT

## 2019-10-28 PROCEDURE — 94760 N-INVAS EAR/PLS OXIMETRY 1: CPT

## 2019-10-28 PROCEDURE — 80048 BASIC METABOLIC PNL TOTAL CA: CPT | Performed by: FAMILY MEDICINE

## 2019-10-28 PROCEDURE — 99232 SBSQ HOSP IP/OBS MODERATE 35: CPT | Performed by: INTERNAL MEDICINE

## 2019-10-28 PROCEDURE — 99233 SBSQ HOSP IP/OBS HIGH 50: CPT | Performed by: PHYSICAL MEDICINE & REHABILITATION

## 2019-10-28 PROCEDURE — 97110 THERAPEUTIC EXERCISES: CPT

## 2019-10-28 PROCEDURE — 99222 1ST HOSP IP/OBS MODERATE 55: CPT | Performed by: NURSE PRACTITIONER

## 2019-10-28 RX ORDER — METHOCARBAMOL 500 MG/1
500 TABLET, FILM COATED ORAL EVERY 6 HOURS PRN
Status: DISCONTINUED | OUTPATIENT
Start: 2019-10-28 | End: 2019-11-06 | Stop reason: HOSPADM

## 2019-10-28 RX ORDER — HYDROCODONE BITARTRATE AND ACETAMINOPHEN 5; 325 MG/1; MG/1
2 TABLET ORAL EVERY 6 HOURS PRN
Status: DISCONTINUED | OUTPATIENT
Start: 2019-10-28 | End: 2019-10-28

## 2019-10-28 RX ORDER — LIDOCAINE 50 MG/G
3 PATCH TOPICAL DAILY PRN
Status: CANCELLED | OUTPATIENT
Start: 2019-10-28

## 2019-10-28 RX ORDER — ALBUTEROL SULFATE 90 UG/1
2 AEROSOL, METERED RESPIRATORY (INHALATION) EVERY 4 HOURS PRN
Status: DISCONTINUED | OUTPATIENT
Start: 2019-10-28 | End: 2019-11-06 | Stop reason: HOSPADM

## 2019-10-28 RX ORDER — TORSEMIDE 20 MG/1
20 TABLET ORAL DAILY
Status: DISCONTINUED | OUTPATIENT
Start: 2019-10-28 | End: 2019-10-28 | Stop reason: HOSPADM

## 2019-10-28 RX ORDER — TORSEMIDE 20 MG/1
20 TABLET ORAL DAILY
Status: DISCONTINUED | OUTPATIENT
Start: 2019-10-29 | End: 2019-10-31

## 2019-10-28 RX ORDER — HYDROCODONE BITARTRATE AND ACETAMINOPHEN 5; 325 MG/1; MG/1
1 TABLET ORAL EVERY 6 HOURS PRN
Status: DISCONTINUED | OUTPATIENT
Start: 2019-10-28 | End: 2019-10-28

## 2019-10-28 RX ORDER — FUROSEMIDE 40 MG/1
80 TABLET ORAL
Status: DISCONTINUED | OUTPATIENT
Start: 2019-10-28 | End: 2019-10-28

## 2019-10-28 RX ORDER — POLYETHYLENE GLYCOL 3350 17 G/17G
17 POWDER, FOR SOLUTION ORAL DAILY PRN
Status: DISCONTINUED | OUTPATIENT
Start: 2019-10-28 | End: 2019-11-06 | Stop reason: HOSPADM

## 2019-10-28 RX ORDER — SERTRALINE HYDROCHLORIDE 25 MG/1
25 TABLET, FILM COATED ORAL
Status: DISCONTINUED | OUTPATIENT
Start: 2019-10-28 | End: 2019-11-06 | Stop reason: HOSPADM

## 2019-10-28 RX ORDER — TORSEMIDE 20 MG/1
40 TABLET ORAL DAILY
Status: DISCONTINUED | OUTPATIENT
Start: 2019-10-28 | End: 2019-10-28

## 2019-10-28 RX ORDER — LEVOTHYROXINE SODIUM 112 UG/1
112 TABLET ORAL DAILY
Status: DISCONTINUED | OUTPATIENT
Start: 2019-10-29 | End: 2019-11-06 | Stop reason: HOSPADM

## 2019-10-28 RX ORDER — HEPARIN SODIUM 5000 [USP'U]/ML
5000 INJECTION, SOLUTION INTRAVENOUS; SUBCUTANEOUS EVERY 8 HOURS SCHEDULED
Status: DISCONTINUED | OUTPATIENT
Start: 2019-10-28 | End: 2019-11-06 | Stop reason: HOSPADM

## 2019-10-28 RX ORDER — ACETAMINOPHEN 325 MG/1
650 TABLET ORAL EVERY 6 HOURS PRN
Status: DISCONTINUED | OUTPATIENT
Start: 2019-10-28 | End: 2019-11-06 | Stop reason: HOSPADM

## 2019-10-28 RX ORDER — PANTOPRAZOLE SODIUM 40 MG/1
40 TABLET, DELAYED RELEASE ORAL
Status: DISCONTINUED | OUTPATIENT
Start: 2019-10-29 | End: 2019-10-29

## 2019-10-28 RX ORDER — GABAPENTIN 300 MG/1
300 CAPSULE ORAL 3 TIMES DAILY
Status: DISCONTINUED | OUTPATIENT
Start: 2019-10-28 | End: 2019-11-06 | Stop reason: HOSPADM

## 2019-10-28 RX ORDER — TORSEMIDE 20 MG/1
20 TABLET ORAL DAILY
Status: CANCELLED | OUTPATIENT
Start: 2019-10-29

## 2019-10-28 RX ORDER — METHOCARBAMOL 500 MG/1
500 TABLET, FILM COATED ORAL EVERY 6 HOURS PRN
Status: CANCELLED | OUTPATIENT
Start: 2019-10-28

## 2019-10-28 RX ORDER — NYSTATIN 100000 [USP'U]/G
POWDER TOPICAL 2 TIMES DAILY
Status: DISCONTINUED | OUTPATIENT
Start: 2019-10-28 | End: 2019-11-06 | Stop reason: HOSPADM

## 2019-10-28 RX ORDER — GABAPENTIN 300 MG/1
300 CAPSULE ORAL 3 TIMES DAILY
Status: CANCELLED | OUTPATIENT
Start: 2019-10-28

## 2019-10-28 RX ORDER — LEVALBUTEROL 1.25 MG/.5ML
1.25 SOLUTION, CONCENTRATE RESPIRATORY (INHALATION)
Status: DISCONTINUED | OUTPATIENT
Start: 2019-10-28 | End: 2019-10-28

## 2019-10-28 RX ORDER — LIDOCAINE 50 MG/G
3 PATCH TOPICAL DAILY PRN
Status: DISCONTINUED | OUTPATIENT
Start: 2019-10-28 | End: 2019-11-06 | Stop reason: HOSPADM

## 2019-10-28 RX ADMIN — HEPARIN SODIUM 5000 UNITS: 5000 INJECTION INTRAVENOUS; SUBCUTANEOUS at 15:38

## 2019-10-28 RX ADMIN — NYSTATIN: 100000 POWDER TOPICAL at 18:15

## 2019-10-28 RX ADMIN — LEVALBUTEROL HYDROCHLORIDE 1.25 MG: 1.25 SOLUTION, CONCENTRATE RESPIRATORY (INHALATION) at 01:01

## 2019-10-28 RX ADMIN — SERTRALINE 25 MG: 25 TABLET, FILM COATED ORAL at 21:37

## 2019-10-28 RX ADMIN — TORSEMIDE 20 MG: 20 TABLET ORAL at 08:54

## 2019-10-28 RX ADMIN — IPRATROPIUM BROMIDE 0.5 MG: 0.5 SOLUTION RESPIRATORY (INHALATION) at 07:22

## 2019-10-28 RX ADMIN — METHOCARBAMOL 500 MG: 500 TABLET ORAL at 20:08

## 2019-10-28 RX ADMIN — PANTOPRAZOLE SODIUM 40 MG: 40 TABLET, DELAYED RELEASE ORAL at 05:19

## 2019-10-28 RX ADMIN — LEVALBUTEROL HYDROCHLORIDE 1.25 MG: 1.25 SOLUTION, CONCENTRATE RESPIRATORY (INHALATION) at 07:22

## 2019-10-28 RX ADMIN — GABAPENTIN 300 MG: 300 CAPSULE ORAL at 08:52

## 2019-10-28 RX ADMIN — HEPARIN SODIUM 5000 UNITS: 5000 INJECTION INTRAVENOUS; SUBCUTANEOUS at 06:00

## 2019-10-28 RX ADMIN — IPRATROPIUM BROMIDE 0.5 MG: 0.5 SOLUTION RESPIRATORY (INHALATION) at 01:01

## 2019-10-28 RX ADMIN — ACETAMINOPHEN 650 MG: 325 TABLET ORAL at 20:07

## 2019-10-28 RX ADMIN — METOPROLOL TARTRATE 12.5 MG: 25 TABLET ORAL at 21:38

## 2019-10-28 RX ADMIN — HEPARIN SODIUM 5000 UNITS: 5000 INJECTION INTRAVENOUS; SUBCUTANEOUS at 21:38

## 2019-10-28 RX ADMIN — NYSTATIN: 100000 POWDER TOPICAL at 09:51

## 2019-10-28 RX ADMIN — GABAPENTIN 300 MG: 300 CAPSULE ORAL at 15:39

## 2019-10-28 RX ADMIN — GABAPENTIN 300 MG: 300 CAPSULE ORAL at 21:37

## 2019-10-28 RX ADMIN — METOPROLOL TARTRATE 12.5 MG: 25 TABLET ORAL at 09:51

## 2019-10-28 RX ADMIN — LEVOTHYROXINE SODIUM 112 MCG: 112 TABLET ORAL at 05:20

## 2019-10-28 NOTE — ASSESSMENT & PLAN NOTE
· Continue BiPAP at night  · Discussed with Pulmonology service, Dr Kellen Landry; patient will likely not be able to be discharged home on BiPAP unless hypercapneic  · Overnight pulse ox study demonstrated SpO2 of <89% for 9 mins; patient qualifies for home O2

## 2019-10-28 NOTE — ASSESSMENT & PLAN NOTE
Patient with a baseline creatinine of 1 71 9  Her torsemide was reduced secondary to increasing renal function  She does need to have been repeat renal artery ultrasound secondary to history of renal artery stenosis  It was attempted to be completed during his recent hospitalization but was unable to be secondary to discomfort

## 2019-10-28 NOTE — ASSESSMENT & PLAN NOTE
Patient had sudden flash pulmonary edema and acute on chronic diastolic CHF exacerbation  She is now on oxygen via nasal cannula during the daytime and placed on BiPAP during the evening following which she has improvement of her symptoms    Pulmonology has recommended home BiPAP for her for which testing and evaluations will continue  Diuretics being adjusted after discussing with Nephrology

## 2019-10-28 NOTE — ASSESSMENT & PLAN NOTE
Due for repeat renal u/s  Was unable to be completed during hospitalization secondary to patient discomfort    Can be coordinated as OP

## 2019-10-28 NOTE — ASSESSMENT & PLAN NOTE
No statin secondary to significant myalgias  Ideally patient should be on a statin given her hx  Last lipid panel 10/23/2019: Total cholesterol 144  Triglycerides 223  HDL 33  LDL 66

## 2019-10-28 NOTE — ASSESSMENT & PLAN NOTE
Patient's renal function is between 1 7-1 9  Avoid nephrotoxin agents  Avoid hypotension  Monitor electrolytes and renal function during diuresis  Off Heller catheter  Monitor voiding  Discontinue Norvasc and decrease dose of metoprolol to help prevent hypotension  Decrease the torsemide to 20 mg daily  I had a long discussion with her daughter and with the patient yesterday and they were concerned about her history of renal artery stenosis and wanted further testing done to evaluate that due to her renal impairment  She has history of renal artery stent placement in the past   At this point in time she was unable to tolerate a renal ultrasound with Doppler twice  The only other option is to use either a CT angiogram or and MRA both require contrast   Long discussion with Nephrology done and it was recommended to avoid any such studies at this time as it may potentiate further worsening of her renal function

## 2019-10-28 NOTE — ASSESSMENT & PLAN NOTE
Lab Results   Component Value Date    HGBA1C 5 0 08/16/2019       Recent Labs     10/25/19  1538 10/25/19  2028 10/26/19  0556 10/27/19  0606   POCGLU 130 154* 103 121       Blood Sugar Average: Last 72 hrs:  (P) 123   Patient's blood sugars are well controlled   Discontinue insulin sliding scale and Accu-Cheks

## 2019-10-28 NOTE — DISCHARGE SUMMARY
Discharge- Digna Jerome 1934, 80 y o  female MRN: [id number]    Unit/Bed#: 7T [facility] 711-02 Encounter: [id number]    Primary Care Provider: Taryn Chicas DO   Date and time admitted to hospital: 10/21/2019  5:07 PM        * Acute on chronic respiratory failure with hypoxia [facility])  Assessment & Plan  Patient had sudden flash pulmonary edema and acute on chronic diastolic CHF exacerbation  She is now on oxygen via nasal cannula during the daytime and placed on BiPAP during the evening following which she has improvement of her symptoms  Pulmonology has recommended home BiPAP for her for which testing and evaluations will continue  Diuretics being adjusted after discussing with Nephrology    Acute on chronic diastolic heart failure [facility])  Assessment & Plan  Wt Readings from Last 3 Encounters:   10/28/19 76 kg (167 lb 8 8 oz)   10/27/19 81 9 kg (180 lb 8 9 oz)   10/21/19 87 5 kg (192 lb 14 4 oz)       Patient is diuresing well  Her weight today is 83 3 kilos and not 76 kilos  That was an error  Her weight has not changed much but she appears to be euvolemic today  Discussed with Nephrology  Secondary to her worsening renal status and worsening bicarb will decrease torsemide to 20 mg daily and observe for now  Continue BiPAP in the evening  Continue to monitor electrolytes and daily weights  Check BMP tomorrow      Morbid obesity ([address] )  Assessment & Plan  Patient's BMI is 36 76  Counseled on diet exercise and lifestyle modification    Type 2 MI (myocardial infarction) [facility])  Assessment & Plan  Secondary to demand ischemia due to acute on chronic diastolic CHF exacerbation    Continue medical management    Controlled type 2 diabetes mellitus with diabetic neuropathy, without long-term current use of insulin [facility])  Assessment & Plan  Lab Results   Component Value Date    HGBA1C 5 0 08/16/2019       Recent Labs     10/25/19  1538 10/25/19  2028 10/26/19  0556 10/27/19  0606   POCGLU 130 154* 103 121 Blood Sugar Average: Last 72 hrs:  (P) 123   Patient's blood sugars are well controlled   Discontinue insulin sliding scale and Accu-Cheks    AZUL (obstructive sleep apnea)  Assessment & Plan  Patient probably requires a BiPAP machine    SDH (subdural hematoma) ([facility])  Assessment & Plan  Patient recently developed a subdural hematoma after a fall which showed Minimal residual right parafalcine  hemorrhage in the inferior aspect of the falx  Chronic anemia  Assessment & Plan  Patient has hemoglobin between 8-9      Acute-on-chronic kidney injury [facility])  Assessment & Plan  Patient's renal function is between 1 7-1 9  Avoid nephrotoxin agents  Avoid hypotension  Monitor electrolytes and renal function during diuresis  Off Heller catheter  Monitor voiding  Discontinue Norvasc and decrease dose of metoprolol to help prevent hypotension  Decrease the torsemide to 20 mg daily  I had a long discussion with her daughter and with the patient yesterday and they were concerned about her history of renal artery stenosis and wanted further testing done to evaluate that due to her renal impairment  She has history of renal artery stent placement in the past   At this point in time she was unable to tolerate a renal ultrasound with Doppler twice  The only other option is to use either a CT angiogram or and MRA both require contrast   Long discussion with Nephrology done and it was recommended to avoid any such studies at this time as it may potentiate further worsening of her renal function      Essential hypertension  Assessment & Plan  Patient's blood pressures are well controlled      Discharging Physician / Practitioner: America Heredia MD  PCP: Jeremías December, DO  Admission Date:   Admission Orders (From admission, onward)     Ordered        10/21/19 1745  Inpatient Admission  Once                   Discharge Date: 10/28/19    Resolved Problems  Date Reviewed: 10/28/2019    None          Consultations During [facility] Stay:  · Nephrology, Cardiology and critical care    Procedures Performed:   · None    Significant Findings / Test Results:   Xr Chest Portable    Result Date: 10/26/2019  Impression: Mild pulmonary vascular congestion with small bilateral pleural effusions  Workstation performed: [id number]     Xr Chest Portable    Result Date: 10/21/2019  Impression: New small effusions with possible adjacent airspace consolidation in the left lung base  Workstation performed: [id number]     Ct Head Wo Contrast    Result Date: 10/21/2019  Impression: Minimal residual right parafalcine  hemorrhage in the inferior aspect of the falx  Workstation performed: [id number]     Xr Chest Portable Icu    Result Date: 10/21/2019  Impression: Mild central pulmonary vascular congestion and small basilar effusions, grossly unchanged  Workstation performed: [id number]     Xr Chest Picc Line Portable    Result Date: 10/22/2019  Impression: 1  Interval insertion of a left-sided PICC line with tip directed laterally in the region of the SVC  2   Persistent bibasilar opacities compatible small effusions and adjacent atelectasis or infection  3   Pulmonary vascular congestion  Workstation performed: [id number]     Incidental Findings:   · None     Test Results Pending at Discharge (will require follow up): · None     Outpatient Tests Requested:  · BMP daily for the 1st 5 days while in acute rehab and then twice a week after that    Complications:  None    Reason for Admission:  Acute shortness of breath    Hospital Course:     Jeimy Frederick is a 80 y o  female patient who originally presented to the hospital on 10/21/2019 due to acute shortness of breath  Patient was found to be in flash pulmonary edema and had to be placed on a BiPAP  She was found to be in acute on chronic diastolic CHF exacerbation and also renal dysfunction    She was placed on IV Lasix her diuretics were adjusted and now she appears to be euvolemic and is currently on torsemide 20 mg daily  She will require daily BMPs for the next 5 days and daily weights and intake and output assessments so that her diuretic dose can be finalized  She also might require BiPAP to be arranged prior to home discharge  Will need follow-up with pulmonology and Nephrology while in acute rehab  Please note that the daughter did have some concerns about her having renal artery stenosis in the past and wanted her to be re-evaluated as she has been having worsening of her renal functions  We tried twice to do a renal ultrasound with Doppler which the patient could not tolerate and had to be canceled  She is not a candidate to have a CT angio or and MRA with gadolinium contrast due to risk of worsening renal function  Will need to have renal ultrasound with Doppler done again in a week or two with analgesia prior to the test   Please see above list of diagnoses and related plan for additional information  Condition at Discharge: good     Discharge Day Visit / Exam:     Subjective:  Patient states that she slept well last night the BiPAP on  She feels much better today and also her breathing seems a little better to her today  She is in good spirits today  Vitals: Blood Pressure: 116/57 (10/28/19 0854)  Pulse: 69 (10/28/19 0854)  Temperature: 97 8 °F (36 6 °C) (10/28/19 0716)  Temp Source: Temporal (10/28/19 0716)  Respirations: 18 (10/28/19 0854)  Height: 4' 11" (149 9 cm) (10/21/19 1900)  Weight - Scale: 76 kg (167 lb 8 8 oz) (10/28/19 0600)  SpO2: 95 % (10/28/19 0716)  Exam:   Physical Exam   Constitutional: She is oriented to person, place, and time  She appears well-developed and well-nourished  HENT:   Head: Normocephalic and atraumatic  Right Ear: External ear normal    Left Ear: External ear normal    Mouth/Throat: Oropharynx is clear and moist    Eyes: Pupils are equal, round, and reactive to light  Conjunctivae and EOM are normal    Neck: Normal range of motion  Neck supple  Cardiovascular: Normal rate, regular rhythm and normal heart sounds  Pulmonary/Chest: Effort normal    Moderate air entry bilaterally with decreased breath sounds bilateral bases   Abdominal: Soft  Bowel sounds are normal  She exhibits no mass  There is no tenderness  There is no rebound and no guarding  Genitourinary:   Genitourinary Comments: deferred   Musculoskeletal: She exhibits edema and tenderness  Neurological: She is alert and oriented to person, place, and time  She has normal reflexes  Cranial nerves 2-12 are normal   Normal neurological exam   Skin: Skin is warm and dry  No rash noted  Psychiatric: She has a normal mood and affect  Nursing note and vitals reviewed  Discussion with Family:  Discussed with daughter yesterday    Discharge instructions/Information to patient and family:   See after visit summary for information provided to patient and family  Provisions for Follow-Up Care:  See after visit summary for information related to follow-up care and any pertinent home health orders  Disposition:     Acute Rehab at [address] [place] [address] to [facility]:   · Not Applicable to this Patient - Not Applicable to this Patient    Planned Readmission:  None     Discharge Statement:  I spent 35 minutes discharging the patient  This time was spent on the day of discharge  I had direct contact with the patient on the day of discharge  Greater than 50% of the total time was spent examining patient, answering all patient questions, arranging and discussing plan of care with patient as well as directly providing post-discharge instructions  Additional time then spent on discharge activities  Discharge Medications:  See after visit summary for reconciled discharge medications provided to patient and family        ** Please Note: This note has been constructed using a voice recognition system **

## 2019-10-28 NOTE — ASSESSMENT & PLAN NOTE
No CPAP at home, new since recent admissions    Discussed with Dr Aldo Dominguez, will get pulmonology involved and coordinate with nocturnal pulse ox to help with approval for home CPAP

## 2019-10-28 NOTE — ASSESSMENT & PLAN NOTE
Lab Results   Component Value Date    HGBA1C 5 0 08/16/2019       Recent Labs     10/25/19  2028 10/26/19  0556 10/27/19  0606   POCGLU 154* 103 121       Blood Sugar Average: Last 72 hrs:      blood sugars were well controlled during her hospitalization  Her last A1c was 5 0  She did not require any insulin during hospitalization    Recommend diabetic diet

## 2019-10-28 NOTE — CONSULTS
Consult- Pako Wood 1934, 80 y o  female MRN: [id number]    Unit/Bed#: [facility] Encounter: [id number]    Primary Care Provider: Barney Carter DO   Date and time admitted to hospital: 10/28/2019  1:22 PM      Inpatient consult to Internal Medicine  Consult performed by: NATHEN Sapp  Consult ordered by: Korin Lee MD        Acute on chronic diastolic heart failure [facility])  Assessment & Plan  Wt Readings from Last 3 Encounters:   10/28/19 83 9 kg (184 lb 15 5 oz)   10/28/19 76 kg (167 lb 8 8 oz)   10/27/19 81 9 kg (180 lb 8 9 oz)     Patient with acute on chronic diastolic CHF  She did require BiPAP, IV diuresis during her ICU stay  Patient did diurese well  Patient appears euvolemic today  She will continue her torsemide 20 mg daily  This was reduced secondary to worsening bicarb and renal function  Do need to continue to follow closely  Recommend daily weights with the same scale    Last echo was 10/21/2019  Showing moderate left ventricle systolic dysfunction with an EF 41%  Severe hypokinesis to akinesis in the distal half of the anterior, lateral and inferior walls with akinesis of the distal 3rd of the septum  The apex is akinetic to paradoxical  The  base of the heart contracts vigorously  Although these findings could be secondary to coronary artery disease, they also are consistent with Takotsubo's syndrome  Grade 2 left ventricle diastolic dysfunction  Moderate to severe MR  Calcified tricuspid aortic valve  Moderate TR  Mild [place]    Renal artery stenosis [facility])  Assessment & Plan  Due for repeat renal u/s  Was unable to be completed during hospitalization secondary to patient discomfort  Can be coordinated as OP    Type 2 MI (myocardial infarction) [facility])  Assessment & Plan   Secondary to demand ischemia due to acute on chronic CHF exacerbation      Controlled type 2 diabetes mellitus with diabetic neuropathy, without long-term current use of insulin ([facility] )  Assessment & Plan  Lab Results   Component Value Date    HGBA1C 5 0 08/16/2019       Recent Labs     10/25/19  2028 10/26/19  0556 10/27/19  0606   POCGLU 154* 103 121       Blood Sugar Average: Last 72 hrs:      blood sugars were well controlled during her hospitalization  Her last A1c was 5 0  She did not require any insulin during hospitalization  Recommend diabetic diet    Hyperlipidemia  Assessment & Plan  No statin secondary to significant myalgias  Ideally patient should be on a statin given her hx  Last lipid panel 10/23/2019: Total cholesterol 144  Triglycerides 223  HDL 33  LDL 66  AZUL (obstructive sleep apnea)  Assessment & Plan  No CPAP at home, new since recent admissions  Discussed with Dr Lm Gupta, will get pulmonology involved and coordinate with nocturnal pulse ox to help with approval for home CPAP    Chronic anemia  Assessment & Plan  Stable  Most recent hemoglobin 8 1    Acute-on-chronic kidney injury [facility])  Assessment & Plan  Patient with a baseline creatinine of 1 71 9  Her torsemide was reduced secondary to increasing renal function  She does need to have been repeat renal artery ultrasound secondary to history of renal artery stenosis  It was attempted to be completed during his recent hospitalization but was unable to be secondary to discomfort  History of aortic valve replacement  Assessment & Plan   Bioprosthetic valve  Last echo was 10/21/2019  Showing moderate left ventricle systolic dysfunction with an EF 41%  Severe hypokinesis to akinesis in the distal half of the anterior, lateral and inferior walls with akinesis of the distal 3rd of the septum  The apex is akinetic to paradoxical  The  base of the heart contracts vigorously  Although these findings could be secondary to coronary artery disease, they also are consistent with Takotsubo's syndrome  Grade 2 left ventricle diastolic dysfunction  Moderate to severe MR  Calcified tricuspid aortic valve  Moderate TR    Mild ND    Depression  Assessment & Plan   Stable  Continue sertraline 25 mg daily    Essential hypertension  Assessment & Plan   Blood pressure is controlled  Continue metoprolol 25 mg b i d and torsemide 20 mg daily    Hypothyroidism  Assessment & Plan   Continue levothyroxine 112 mcg daily  TSH within normal limits 10/21/2019    * SDH (subdural hematoma) [facility])  Assessment & Plan  Secondary to a fall 10/8  Patient was [facility] to [facility] 10/8 through 10/10  She was evaluated by Neurosurgery who recommended conservative management of SDH  VTE Prophylaxis: Heparin  / sequential compression device     History of Present Illness:    Noel Cabello is a 80 y o  female who is [facility]   Patient was actually initially hospitalized at [facility] 10/8 through 10/10 following a fall  Patient has seen a fall due to leg weakness when trying to get up the stairs  CT head showed traumatic subdural hematoma  Next which was reversed with DDAVP  Patient was seen by Neurosurgery who recommended conservative management of the as [facility]  Patient was initially discharged home with family support  Patient then presented to the hospital due to weakness, dyspnea  Chest x-ray showed cardiomegaly and mild pulmonary vascular congestion  She was due to be transferred to acute rehab on 10/16/2019 however patient was noted to be in respiratory distress requiring BiPAP and was transferred to the ICU  A chest x-ray was completed which was worse from her previous chest x-ray  Patient was treated for a CHF exacerbation with diuresis and titrated off of the BiPAP  Patient was discharged to acute rehab on 10/19/2019    While in acute rehab patient experience respiratory distress, elevated BNP at to six, 100  Additional chest x-ray showed mild central pulmonary vascular congestion and small basilar effusions  She was given IV Lasix and required BiPAP  Patient was transferred the ICU on 10/21  Cardiology was consulted during her ICU stay and felt that her echo findings strongly support a non ST elevated MI infarction, type 2 secondary to demand ischemia due to acute on chronic diastolic CHF exacerbation  They recommended that she continue IV diuresis  Her IV Lasix was changed to p  O  Demadex  Additionally patient was seen by Nephrology who recommended repeat renal artery Doppler given her history of renal artery stenosis  However the patient was unable tolerated the procedure  This is recommended being completed as an outpatient    After discharge from hospital to acute rehab is recommended the patient have daily BMPs for the next five days, daily weights, I&Os and closely monitor her fluid status given her recurrent exacerbations    We are consulted for medical management    Review of Systems:    Review of Systems   Constitutional: Negative for appetite change, chills, fatigue, fever and unexpected weight change  HENT: Negative for congestion  Respiratory: Negative for cough, chest tightness, shortness of breath and wheezing  Cardiovascular: Negative for chest pain, palpitations and leg swelling  Gastrointestinal: Negative for abdominal pain, constipation, diarrhea, nausea and vomiting  Genitourinary: Negative for difficulty urinating and dysuria  Musculoskeletal: Negative for arthralgias and myalgias  Neurological: Negative for dizziness, light-headedness and headaches         + restless leg   Psychiatric/Behavioral: Positive for sleep disturbance  Self-injury: related to restless leg         Past Medical and Surgical History:     Past Medical History:   Diagnosis Date    Arthritis     [facility]    Cardiac disease     aortic valve transplant    [facility]est[facility]     Compression fracture of body of thoracic vertebra (HCC)     CVA (cerebral vascular accident) ([facility] )     Diabetes mellitus ([facility] )     Disease of thyroid gland     Fibromyalgia, primary     H/O cervical fracture 01/09/2019    Hyperlipidemia     Hypertension     Neuropathy     AZUL (obstructive sleep apnea) 10/19/2019    Pressure injury of skin     Renal disorder     kidney stent    Stroke [facility])        Past Surgical History:   Procedure Laterality Date    AORTIC VALVE SURGERY      BREAST LUMPECTOMY Right     BREAST LUMPECTOMY Left     BREAST SURGERY      lumpectomy for breast cancer    CATARACT EXTRACTION      CHOLECYSTECTOMY      HYSTERECTOMY  1978    JOINT REPLACEMENT      KIDNEY SURGERY      stent placed for kidney    OTHER SURGICAL HISTORY      abdominal aneurysm surgery    IA ARTHRODESIS POSTERIOR ATLAS-AXIS C1-C2 N/A 5/1/2019    Procedure: C1-C2 lateral mass fixation fusion with possible sublaminar cables;  Surgeon: Liv Castañeda MD;  Location: [facility];  Service: Neurosurgery    REPLACEMENT TOTAL KNEE      left        Meds/Allergies:    all medications and allergies reviewed    Allergies: Allergies   Allergen Reactions    Duloxetine Hcl Other (See Comments) and Hypertension    Duloxetine Hcl      Cymbalta; Hemorrhagic stroke listed as reaction    Escitalopram      Other reaction(s): Urinary Retention    Pregabalin      Other reaction(s): Hypertension    Statins Myalgia    Tramadol      Other reaction(s): Hypertension    Triprolidine-Pse Other (See Comments)    Anastrozole Abdominal Pain    Anastrozole     Antihistamines, Diphenhydramine-Type     Exemestane      Aromasin; Muscle pain & cramps    Lexapro [Escitalopram]      Urinary retention    Lyrica [Pregabalin]      hypertension    Metformin      diarrhea    Oxycodone      Pt unsure      Statins      Muscle pain & cramps    Tramadol      hypertension    Triprolidine-Pseudoephedrine     Metformin Diarrhea       Social History:     Marital Status:      Substance Use History:   Social History     Substance and Sexual Activity   Alcohol Use Yes  Frequency: Monthly or less    Drinks per session: 1 or 2    Binge frequency: Less than monthly     Social History     Tobacco Use   Smoking Status Never Smoker   Smokeless Tobacco Never Used     Social History     Substance and Sexual Activity   Drug Use Never       Family History:    Family History   Problem Relation Age of Onset    Heart disease Mother     Arthritis Mother     Diabetes Mother     Heart failure Father     Arthritis Father     Other Father         enlargement of prostate     Dementia Father     No Known Problems Daughter     No Known Problems Maternal Grandmother     No Known Problems Maternal Grandfather     No Known Problems Paternal Grandmother     No Known Problems Paternal Grandfather     No Known Problems Maternal Aunt     No Known Problems Maternal Aunt     No Known Problems Maternal Aunt     No Known Problems Maternal Aunt     No Known Problems Paternal Aunt     No Known Problems Paternal Aunt      Physical Exam:     Vitals:   Blood Pressure: 113/54 (10/28/19 1552)  Pulse: 68 (10/28/19 1552)  Temperature: 98 5 °F (36 9 °C) (10/28/19 1552)  Temp Source: Tympanic (10/28/19 1552)  Respirations: 18 (10/28/19 1552)  Height: 4' 11" (149 9 cm) (10/28/19 1315)  Weight - Scale: 83 9 kg (184 lb 15 5 oz) (10/28/19 1315)  SpO2: 96 % (10/28/19 1552)    Physical Exam   Constitutional: She is oriented to person, place, and time  She appears well-developed and well-nourished  No distress  euvolemic   Neck: No JVD present  Cardiovascular: Normal rate, regular rhythm and normal heart sounds  No murmur heard  No pedal edema   Pulmonary/Chest: Effort normal and breath sounds normal  No respiratory distress  She has no wheezes  Neurological: She is alert and oriented to person, place, and time  No focal deficits   Skin: Skin is warm and dry  Psychiatric: She has a normal mood and affect   Her behavior is normal  Judgment and thought content normal    Nursing note and vitals reviewed  Additional Data:     Lab Results: I have personally reviewed pertinent reports  Results from last 7 days   Lab Units 10/23/19  0451   WBC Thousand/uL 6 50   HEMOGLOBIN g/dL 8 1*   HEMATOCRIT % 24 1*   PLATELETS Thousands/uL 202   NEUTROS PCT % 66*   LYMPHS PCT % 23*   MONOS PCT % 8   EOS PCT % 2     Results from last 7 days   Lab Units 10/28/19  0433  10/22/19  0458   POTASSIUM mmol/L 3 7   < > 4 5   CHLORIDE mmol/L 90*   < > 94*   CO2 mmol/L 37*   < > 32*   BUN mg/dL 63*   < > 52*   CREATININE mg/dL 1 95*   < > 1 63*   CALCIUM mg/dL 8 9   < > 8 7   ALK PHOS U/L  --   --  72   ALT U/L  --   --  32   AST U/L  --   --  50*    < > = values in this interval not displayed  Results from last 7 days   Lab Units 10/21/19  1853   INR  0 94       Imaging: I have personally reviewed pertinent reports  EKG, Pathology, and Other Studies Reviewed on Admission:   EKG: 10/21/2019:  "Normal sinus rhythm  Left axis deviation  Low voltage QRS  Abnormal ECG  When compared with ECG of 16-AUG-2019 00:47,  Incomplete right bundle branch block is no longer Present  Questionable change in initial forces of Anterior leads  Nonspecific T wave abnormality now evident in Lateral leads  Confirmed by Shruti Flores ([id number]) on 10/22/2019 8:28:13 AM"    M*ALLGOOB software was used to dictate this note  It may contain errors with dictating incorrect words or incorrect spelling   Please contact the provider directly with any questions

## 2019-10-28 NOTE — ASSESSMENT & PLAN NOTE
Secondary to a fall 10/8  Patient was admitted to [facility] 10/8 through 10/10  She was evaluated by Neurosurgery who recommended conservative management of SDH

## 2019-10-28 NOTE — PROGRESS NOTES
PHYSICAL MEDICINE AND REHABILITATION   PREADMISSION ASSESSMENT     Projected [facility] Diagnoses:  Impairment of mobility, safety and Activities of Daily Living (ADLs) due to Brain Dysfunction:  02 22  Traumatic, Closed Injury   Etiology: Right parafalcine Hemorrhage   Date of Onset:10/18/19    Date of surgery: N/A    PATIENT INFORMATION  Name: Ivy Harris Phone #: [phone] (home)   Address: [address] [place] [id number]  YOB: 1934 Age: 80 y o  SS#   Marital Status:   Ethnicity:   Employment Status: retired  Extended Emergency Contact Information  Primary Emergency Contact: Luci Gould  Address: [address] [facility], [address] [place] [address] Phone: [phone]  Mobile Phone: [phone]  Relation: Daughter  Advance Directive: Level 1 Full Code, AD Unknown    INSURANCE/COVERAGE:     Primary Payor: [employer]  REP / Plan: [employer]ia [employer]rist[employer]  REP / Product Type: Medicare HMO /   Secondary Payer: Private Pay   Payer Contact:  Payer Contact:   Contact Phone:  Contact Phone:   Authorization #: [id number]  Coverage Dates:10/28-11/3  LCD: 11/3 with update due 11/4  Medicare ID #: [id number]   Medicare Days:  Medical Record #: [id number]    REFERRAL SOURCE:   Referring provider: Jonah Fleming MD  Referring facility: [address] [place] [address] Heart  Room: 7T /7T -02  PCP: Eileen Ramey DO PCP phone number: [phone]    MEDICAL INFORMATION  HPI: Patient is an 80year old female that presented to [place] [address] for evaluation s/p fall at home  Per patient's daughter, patient sustained the fall due to leg weakness when trying to go up the stairs  Patient denies hitting her head or loss of consciousness  Patient was recently hospitalized at [address] [place] [address] (10/8 - 10/10) following a fall while there for an outpatient visit   Imaging revealed a traumatic subdural hematoma  Patient was on Plavix, which was reversed with DDAVP  Patient was evaluated by Neurosurgery, who recommended conservative management of SDH, and patient was discharged home with family support  On current presentation, patient's daughter states that patient has been more weak since at home, causing fall leading to presentation  Lab work was notable for hyponatremia and HERLINDA, with Lasix placed on hold  CT of the head showed right parafalcine extra-axial hemorrhage, stable; no new hemorrhage; chronic small vessel ischemic changes  Patient was continued on Keppra BID for seizure prophylaxis, with seizure precautions in place  Patient was noted with mild dyspnea, and was given MDI and small dose IV Lasix x2, with noted improvement  CXR showed cardiomegaly and mild pulmonary vascular congestion  The patient was due to transfer to Acute Rehab on 10/16/19 however MD was called to the patients room as the patient was noted to be in some respiratory distress and feeling SOB  Her SPO2 was noted to be 88% and she was placed on bipap which increased her SPO2 to 98%  She was transferred to the ICU  A chest xray was completed which appeared slightly worse from her previous chest xray which was done on 10/15/19  The patient was eventually titrated off of the bipap  It was felt her SOB was likely a CHF exacerbation and the patient was diuresed  Patient discharged to acute rehab on 10/19/19  While there that patient was with respiratory distress and elevated BNP at 26,100  Chest x-ray showed Mild central pulmonary vascular congestion and small basilar effusions  Patient did refuse a dose of torsemide while at rehab due to loose stools  She was given a dose of Lasix 40mg by rehab physician followed by 80mg IV Lasix per nephrology  Initial blood gas showed pH of 7 1, CO2 of 81, PO2 156, bicarb of 27 6 Patient was assessed by respiratory and placed on bipap and transferred to the ICU on 10/21    She was placed on a nitro drip for significantly elevated blood pressure  Troponins were elevated to 3 24  She was given IV solumedrol and blood cultures were obtained  Procalcitonin was 0 29  She was started on Zithromax, ceftriaxone, and vanco   Repeat CXR showed some pulmonary vascular congestion  Additional IV Lasix was given  She was transitioned to O2 vial nasal cannula during the day and bi pap over night  Echo was completed that showed moderate left ventricular systolic dysfunction, EF 67%  Severe hypokinesis to akinesis in the distal half of the anterior, lateral and inferior walls with akinesis of the distal 3rd of the septum  The apex is akinetic to paradoxical  Thebase of the heart contracts vigorously  Although these findings could be secondary to coronary artery disease, they also are consistent with Takotsubo's syndrome and grade 2 left ventricular diastolic dysfunction  PICC line was inserted on 10/22 for poor IV access  Nephrology followed her throughout her hospital stay and felt that higher creatinine may need to be considered to keep patient euvolemic and may be her new baseline and felt that it was likely due to BP variability  Blood pressure medications were adjusted to avoid hypotension  I and Os were closely monitored  She was started on 80mg IV lasix Q 8 hours  Blood cultures were negative and antibiotics were discontinued on 10/22  Patient was stable to transfer out of the ICU on 10/23  Cardiology was consulted and felt echocardiographic findings strongly support a non ST elevation myocardial infarction, type 2 secondary to demand ischemia due to acute on chronic diastolic CHF exacerbation They recommended that she continue on IV diuresis due to CHF exacerbation  IV Lasix was changed to PO demadex  Nephrology recommended renal artery doppler due to history of renal artery stenosis, however the patient was not able to tolerate the pressure   It is being recommended that this be completed as an outpatient  PT and OT have evaluated that patient and are recommending inpatient rehab as well as the attending physician  She is medically stable for transfer at this time  Past Medical History:   Past Surgical History: Allergies:     Past Medical History:   Diagnosis Date    Arthritis     Breast cancer ([facility] ) 2015    Cardiac disease     aortic valve transplant    [facility]est[facility]     Compression fracture of body of thoracic vertebra (HCC)     CVA (cerebral vascular accident) ([facility] )     Diabetes [facility]     Disease of thyroid gland     Fibromyalgia, primary     H/O cervical fracture 01/09/2019    Hyperlipidemia     Hypertension     Neuropathy     AZUL (obstructive sleep apnea) 10/19/2019    Pressure injury of skin     Renal disorder     kidney stent    Stroke [facility])     Past Surgical History:   Procedure Laterality Date    AORTIC VALVE SURGERY      BREAST LUMPECTOMY Right     BREAST LUMPECTOMY Left     BREAST SURGERY      lumpectomy for breast cancer    CATARACT EXTRACTION      CHOLECYSTECTOMY      HYSTERECTOMY  1978    KIDNEY SURGERY      stent placed for kidney    OTHER SURGICAL HISTORY      abdominal aneurysm surgery    MO ARTHRODESIS POSTERIOR ATLAS-AXIS C1-C2 N/A 5/1/2019    Procedure: C1-C2 lateral mass fixation fusion with possible sublaminar cables;  Surgeon: Erica Alfonso MD;  [facility]: [facility];  Service: Neurosurgery    REPLACEMENT TOTAL KNEE      left      Allergies   Allergen Reactions    Duloxetine Hcl Other (See Comments) and Hypertension    Duloxetine Hcl      Cymbalta;  Hemorrhagic stroke listed as reaction    Escitalopram      Other reaction(s): Urinary Retention    Pregabalin      Other reaction(s): Hypertension    Statins Myalgia    Tramadol      Other reaction(s): Hypertension    Triprolidine-Pse Other (See Comments)    Anastrozole Abdominal Pain    Anastrozole     Antihistamines, Diphenhydramine-Type     Exemestane Aromasin; Muscle pain & cramps    Lexapro [Escitalopram]      Urinary retention    Lyrica [Pregabalin]      hypertension    Metformin      diarrhea    Oxycodone      Pt unsure      Statins      Muscle pain & cramps    Tramadol      hypertension    Triprolidine-Pseudoephedrine     Metformin Diarrhea         Comorbidities: Falls, HERLINDA on CKD, CHF exacerbation, acute on chronic respiratory failure, DM II, type 2 MI, AZUL, chronic anemia, HTN, and obesity  CURRENT VITAL SIGNS:   Temp:  [97 3 °F (36 3 °C)-97 9 °F (36 6 °C)] 97 8 °F (36 6 °C)  HR:  [62-72] 62  Resp:  [18-20] 18  BP: ()/(54-65) 114/65   Intake/Output Summary (Last 24 hours) at 10/28/2019 0851  Last data filed at 10/28/2019 0625  Gross per 24 hour   Intake 1020 ml   Output 1200 ml   Net -180 ml        LABORATORY RESULTS:      Lab Results   Component Value Date    HGB 8 1 (L) 10/23/2019    HGB 11 9 08/27/2015    HCT 24 1 (L) 10/23/2019    HCT 36 2 08/27/2015    WBC 6 50 10/23/2019    WBC 5 88 08/27/2015     Lab Results   Component Value Date    BUN 63 (H) 10/28/2019    BUN 20 11/06/2015     11/06/2015    K 3 7 10/28/2019    K 4 8 11/06/2015    CL 90 (L) 10/28/2019     11/06/2015    GLUCOSE 41 (L) 05/01/2019    GLUCOSE 82 11/06/2015    CREATININE 1 95 (H) 10/28/2019    CREATININE 1 19 11/06/2015     Lab Results   Component Value Date    PROTIME 10 3 10/21/2019    PROTIME 12 6 08/27/2015    INR 0 94 10/21/2019    INR 1 00 08/27/2015        DIAGNOSTIC STUDIES:  Xr Chest Portable    Result Date: 10/26/2019  Impression: Mild pulmonary vascular congestion with small bilateral pleural effusions  Workstation performed: [id number]     Xr Chest Portable    Result Date: 10/21/2019  Impression: New small effusions with possible adjacent airspace consolidation in the left lung base   Workstation performed: [id number]     Ct Head Wo Contrast    Result Date: 10/21/2019  Impression: Minimal residual right parafalcine  hemorrhage in the inferior aspect of the falx  Workstation performed: [id number]     Xr Chest Portable Icu    Result Date: 10/21/2019  Impression: Mild central pulmonary vascular congestion and small basilar effusions, grossly unchanged  Workstation performed: [id number]     Xr Chest Picc Line Portable    Result Date: 10/22/2019  Impression: 1  Interval insertion of a left-sided PICC line with tip directed laterally in the region of the SVC  2   Persistent bibasilar opacities compatible small effusions and adjacent atelectasis or infection  3   Pulmonary vascular congestion   Workstation performed: [id number]       PRECAUTIONS/SPECIAL NEEDS:  Tobacco:   Social History     Tobacco Use   Smoking Status Never Smoker   Smokeless Tobacco Never Used   , Alcohol:    Social History     Substance and Sexual Activity   Alcohol Use Never    Frequency: Never    Binge frequency: Never    Blood Sugar Management: as per MD, Edema Management, Safety Concerns, Pain Management, Bladder Incontinence: # of accidents 3 in 3 days, Dietary Restrictions: Cardiac, FR 1200mL, Consistent Carb Level 2 and Fall Precautions    MEDICATIONS:     Current Facility-Administered Medications:     acetaminophen (TYLENOL) tablet 650 mg, 650 mg, Oral, Q6H PRN, NATHEN Ponce, 650 mg at 10/27/19 2130    gabapentin (NEURONTIN) capsule 300 mg, 300 mg, Oral, TID, Rosalinda Menezes MD, 300 mg at 10/27/19 2146    heparin (porcine) subcutaneous injection 5,000 Units, 5,000 Units, Subcutaneous, Q8H [address], 5,000 Units at 10/28/19 0600 **AND** [CANCELED] Platelet count, , , Once, Levon Franks PA-C    HYDROcodone-acetaminophen (NORCO) 5-325 mg per tablet 1 tablet, 1 tablet, Oral, Q6H PRN, NATHEN Ponce    HYDROcodone-acetaminophen (NORCO) 5-325 mg per tablet 2 tablet, 2 tablet, Oral, Q6H PRN, NTAHEN Ponce    ipratropium (ATROVENT) 0 02 % inhalation solution 0 5 mg, 0 5 mg, Nebulization, Q6H, NATHEN Ponce, 0 5 mg at 10/28/19 4274    levalbuterol (XOPENEX) inhalation solution 1 25 mg, 1 25 mg, Nebulization, Q6H, Raina Nest, CRNP, 1 25 mg at 10/28/19 0003    levothyroxine tablet 112 mcg, 112 mcg, Oral, Daily, Thurston Nest, CRNP, 112 mcg at 10/28/19 0520    lidocaine (LIDODERM) 5 % patch 3 patch, 3 patch, Topical, Daily PRN, Jaelyn Plaza MD, 2 patch at 10/27/19 1322    methocarbamol (ROBAXIN) tablet 500 mg, 500 mg, Oral, Q6H PRN, Jaelyn Plaza MD, 500 mg at 10/27/19 1348    metoprolol tartrate (LOPRESSOR) tablet 25 mg, 25 mg, Oral, Q12H [address], Thurston Nest, CRNP, 25 mg at 10/27/19 1130    nystatin (MYCOSTATIN) powder, , Topical, BID, Raina Nest, CRNP, 1 application at 74/85/16 1727    pantoprazole (PROTONIX) EC tablet 40 mg, 40 mg, Oral, Early Morning, Raina Nest, CRNP, 40 mg at 10/28/19 0519    polyethylene glycol (MIRALAX) packet 17 g, 17 g, Oral, Daily PRN, Raina Nest, CRNP, 17 g at 10/25/19 0272    sertraline (ZOLOFT) tablet 25 mg, 25 mg, Oral, HS, Thurston Nest, CRNP, 25 mg at 10/27/19 2146    torsemide (DEMADEX) tablet 20 mg, 20 mg, Oral, Daily, Jaelyn Plaza MD    SKIN INTEGRITY:   Scattered ecchymosis and excoriation to the abdominal fold    PRIOR LEVEL OF FUNCTION:  She lives in [facility] an apartment that is connected to her daughter's home  Ryder Garcia is  and lives with their family  Self Care: Independent, Indoor Mobility: Modified Independent, Stairs (in/outdoor): Modified Independent  and Cognition: Independent    HOME ENVIRONMENT:  The living area: can live on one level  There is a ramp vs 3 steps to enter the home  The patient will not have 24 hour supervision/physical assistance available upon discharge  Daughter is available to help as needed    PREVIOUS DME:  Equipment in home (previous DME): Grab Bars, Rolling Walker and Walk-in shower    FUNCTIONAL STATUS:  Physical Therapy Occupational Therapy Speech Therapy   As per PT:      10/28/19 0802   Pain Assessment   Pain Assessment 0-10   Pain Score No Pain   Restrictions/Precautions   Weight Bearing Precautions Per Order No   Other Precautions O2;Fall Risk   General   Chart Reviewed Yes   Family/Caregiver Present No   Cognition   Arousal/Participation Cooperative   Attention Within functional limits   Following Commands Follows all commands and directions without difficulty   Subjective   Subjective My toes feel like cement underneath  Transfers   Sit to Stand 5  Supervision   Additional items Assist x 1; Increased time required;Armrests   Stand to Sit 5  Supervision   Additional items Assist x 1; Increased time required;Armrests   Stand pivot 5  Supervision   Additional items Assist x 1; Increased time required;Verbal cues   Ambulation/Elevation   Gait pattern Excessively slow; Short stride;Decreased foot clearance   Gait Assistance    (CGA)   Additional items Assist x 1   Assistive Device Rolling walker   Distance 40 feet   Balance   Static Sitting Good   Static Standing Fair   Ambulatory Fair   Activity Tolerance   Activity Tolerance Patient tolerated treatment well   Nurse Made Aware RN clears patient for treatment   Assessment   Prognosis Good   Problem List Decreased strength;Decreased range of motion;Decreased endurance; Impaired balance;Decreased mobility; Decreased coordination; Impaired sensation;Obesity   Assessment Pt is agreeable for treatment  She reports she will be having MRI this afternoon  Pt is seated OOB in chair on arrival   Pt demonstrates stable SpO2 sats at rest and with short distance ambulation  SpO2 92-93% on rrom air, HR resting 69 bpm and post ambulation 80 bpm  She tolerated LE TE for 10 -20 reps  TE consist of ankle DF/PF x 20, hip IR/ER x 10, Hip abd/add x 10, LAQ's with 3 sec hold  All TE to BLE  Upgrade goals as patient has achieved transfer and ambulation goal   New goals as follows: Pt will perform sit to stand from bed/chair and BSC with CS  Pt will ambulate 75 feet x 2 with RW CS with stable cardiopulmonary parameters   Continue with bed mobility goals as patient is OOB in chair on arrival  POC update and extended through 11/1/19      As per nursing on 6/26/19: moderate assist with bathing     As per OT:      10/25/19 1130   Restrictions/Precautions   Weight Bearing Precautions Per Order No   Other Precautions O2;Telemetry; Fall Risk   Pain Assessment   Pain Assessment No/denies pain   ADL   Grooming Assistance 5  Supervision/Setup   Grooming Deficit Teeth care   UB Dressing Assistance 4  Minimal Assistance   UB Dressing Deficit    (for gown change )   Functional Standing Tolerance   Time ~ 1 minute   Activity functional static stand    Comments with use of a RW   Bed Mobility   Supine to Sit 3  Moderate assistance   Additional items Assist x 1; Increased time required;LE management   Sit to Supine 2  Maximal assistance   Additional items Assist x 1;LE management;Verbal cues; Increased time required   Transfers   Sit to Stand 4  Minimal assistance   Additional items Assist x 1   Stand to Sit 4  Minimal assistance   Additional items Assist x 1   Cognition   Arousal/Participation Cooperative; Alert   Attention Attends with cues to redirect   Orientation Level Oriented to person;Oriented to place; Disoriented to time   Memory Decreased short term memory;Decreased recall of recent events   Following Commands Follows one step commands without difficulty   Comments Pt with initial mild confusion, "how long have I been here?" Not oriented to year    Activity Tolerance   Activity Tolerance Patient tolerated treatment well   Assessment   Assessment Pt seen for OT txt  Pt pleasant, endorses fatigue from poor sleep last night  Pt able to tolerate ~ 10 minutes of unsupported sitting to complete grooming and UB dressing tasks at EOB  Pt completed sit-->stand and steps at EOB with Deondre x 1  Pt requesting return to bed  Pt overall demonstrating improvement with activity tolerance and endurance, improved O2 saturations, continues to require cues for compensatory breathing    Pt would benefit from continued OT services to further address deficits with functional strength, safety awareness, and dynamic standing balance  Continue OT POC  N/A      CURRENT GAP IN FUNCTION   Prior to admission the patient was modified independent with transfers and ambulation with rolling walker, independent with ADLs and IADLs  Daughter provided transportation  Estimated length of stay: 7 to 10 days    Anticipated Post-Discharge Disposition/Treatment  Disposition: Return to previous home/apartment  Outpatient Services: Physical Therapy (PT) and Occupational Therapy (OT)    BARRIERS TO DISCHARGE  Weakness, Pain, Diminished cognition/Mentation change, Balance Difficulty, Fever/Chills, Fatigue, Home Accessibility, Caregiver Accessibility, Financial Resources, Equipment Needs and Resource Availability    INTERVENTIONS FOR DISCHARGE  Adaptive equipment, Patient/Family/Caregiver Education, [employer], Financial Assistance, Arrange DME needs, Medication Changes as per MD, Therapy exercises and Center of balance support     REQUIRED THERAPY:  Patient will require PT and OT 90 minutes each per day, five days per week to achieve rehab goals  REQUIRED FUNCTIONAL AND MEDICAL MANAGEMENT FOR INPATIENT REHABILITATION:  Skin:  Scattered ecchymosis and excoriation to the abdominal fold , Pain Management: Overall pain is well controlled, Deep Vein Thrombosis (DVT) Prophylaxis:  SCD's while in bed, Diabetes Management: continue sliding scale insulin, patient to do finger sticks as ordered, SLIM to continue to manage diabetes, and additional medical conditions, nursing for education and bladder management, PM&R to maximize function and provide medical oversight, PT/OT intervention, patinet and family education and training, nephrology to follow, and any consults as needed  RECOMMENDED LEVEL OF CARE:  Patient presented to [facility] on 10/12/19 with complaints of worsening weakness    She was previously hospitalized on found to have a TBI and discharged to home  CT of the head showed right parafalcine extra-axial hemorrhage, stable; no new hemorrhage; chronic small vessel ischemic changes  She was continued on keppra for seizure prophylaxis  During that hospitalization she was in respiratory distress and was diuresed  She was transferred to acute rehab when stable  While at rehab she was again in respiratory distress  She was transferred to the ICU and required bipap  She has since been weaned to room air  She was diuresed and closely followed by nephrology  Per cardiology echo was consistent with Takotsubo's syndrome and grade 2 left ventricular diastolic dysfunction  Cardiology agreed florina IV lasix  She is currently on PO demadex  Nephrology followed the patient without her stay and recommended renal artery ultrasound as an outpatient  Prior to admission the patient was modified independent with transfers and ambulation with rolling walker, independent with ADLs and IADLs  Daughter provided transportation  Currently she is CGA with rolling walker going 80 feet and per nursing is mod assist with ADLs  Nursing is being recommended for education, internal medicine to monitor and manage medical conditions, PM&R to maximize function and provide medical oversight, and inpatient rehab to maximize self care and mobility to modified independent upon discharge to home with the support of her daughter

## 2019-10-28 NOTE — PLAN OF CARE
Problem: PHYSICAL THERAPY ADULT  Goal: Performs mobility at highest level of function for planned discharge setting  See evaluation for individualized goals  Description  Treatment/Interventions: Functional transfer training, LE strengthening/ROM, Therapeutic exercise, Endurance training, Cognitive reorientation, Patient/family training, Equipment eval/education, Bed mobility, Gait training, Compensatory technique education, Spoke to nursing, OT  Equipment Recommended: (TBD in rehab)       See flowsheet documentation for full assessment, interventions and recommendations  Outcome: Progressing  Note:   Prognosis: Good  Problem List: Decreased strength, Decreased range of motion, Decreased endurance, Impaired balance, Decreased mobility, Decreased coordination, Impaired sensation, Obesity  Assessment: Pt is agreeable for treatment  She reports she will be having MRI this afternoon  Pt is seated OOB in chair on arrival   Pt demonstrates stable SpO2 sats at rest and with short distance ambulation  SpO2 92-93% on rrom air, HR resting 69 bpm and post ambulation 80 bpm  She tolerated LE TE for 10 -20 reps  TE consist of ankle DF/PF x 20, hip IR/ER x 10, Hip abd/add x 10, LAQ's with 3 sec hold  All TE to BLE  Upgrade goals as patient has achieved transfer and ambulation goal   New goals as follows: Pt will perform sit to stand from bed/chair and BSC with CS  Pt will ambulate 75 feet x 2 with RW CS with stable cardiopulmonary parameters  Continue with bed mobility goals as patient is OOB in chair on arrival  POC update and extended through 11/1/19  Barriers to Discharge: [address] home environment     Recommendation: Post acute IP rehab     PT - OK to Discharge: Yes    See flowsheet documentation for full assessment

## 2019-10-28 NOTE — ASSESSMENT & PLAN NOTE
Wt Readings from Last 3 Encounters:   11/06/19 83 8 kg (184 lb 12 8 oz)   10/28/19 76 kg (167 lb 8 8 oz)   10/27/19 81 9 kg (180 lb 8 9 oz)     · Monitor daily weights  · Torsemide resumed  · Metolazone started as well  · Discussed with renal today  Per service, continue torsemide 40mg BID  In addition, PRN metolazone 5mg if patient gains 2lbs in 2 days

## 2019-10-28 NOTE — PLAN OF CARE
Problem: Prexisting or High Potential for Compromised Skin Integrity  Goal: Skin integrity is maintained or improved  Description  INTERVENTIONS:  - Identify patients at risk for skin breakdown  - Assess and monitor skin integrity  - Assess and monitor nutrition and hydration status  - Monitor labs   - Assess for incontinence   - Turn and reposition patient  - Assist with mobility/ambulation  - Relieve pressure over bony prominences  - Avoid friction and shearing  - Provide appropriate hygiene as needed including keeping skin clean and dry  - Evaluate need for skin moisturizer/barrier cream  - Collaborate with interdisciplinary team   - Patient/family teaching  - Consider wound care consult   10/28/2019 1250 by Aki Fountain RN  Outcome: Completed  10/28/2019 0738 by Aki Fountain RN  Outcome: Progressing     Problem: Potential for Falls  Goal: Patient will remain free of falls  Description  INTERVENTIONS:  - Assess patient frequently for physical needs  -  Identify cognitive and physical deficits and behaviors that affect risk of falls    -  [place] fall precautions as indicated by assessment   - Educate patient/family on patient safety including physical limitations  - Instruct patient to call for assistance with activity based on assessment  - Modify environment to reduce risk of injury  - Consider OT/PT consult to assist with strengthening/mobility  10/28/2019 1250 by Aki Fountain RN  Outcome: Completed  10/28/2019 0738 by Aki Fountain RN  Outcome: Progressing     Problem: PAIN - ADULT  Goal: Verbalizes/displays adequate comfort level or baseline comfort level  Description  Interventions:  - Encourage patient to monitor pain and request assistance  - Assess pain using appropriate pain scale  - Administer analgesics based on type and severity of pain and evaluate response  - Implement non-pharmacological measures as appropriate and evaluate response  - Consider cultural and social influences on pain and pain management  - Notify physician/advanced practitioner if interventions unsuccessful or patient reports new pain  10/28/2019 1250 by Brie Appiah RN  Outcome: Completed  10/28/2019 0738 by Brie Appiah RN  Outcome: Progressing     Problem: INFECTION - ADULT  Goal: Absence or prevention of progression during hospitalization  Description  INTERVENTIONS:  - Assess and monitor for signs and symptoms of infection  - Monitor lab/diagnostic results  - Monitor all insertion sites, i e  indwelling lines, tubes, and drains  - Monitor endotracheal if appropriate and nasal secretions for changes in amount and color  - [place] appropriate cooling/warming therapies per order  - Administer medications as ordered  - Instruct and encourage patient and family to use good hand hygiene technique  - Identify and instruct in appropriate isolation precautions for identified infection/condition  10/28/2019 1250 by Brie Appiah RN  Outcome: Completed  10/28/2019 0738 by Brie Appiah RN  Outcome: Progressing  Goal: Absence of fever/infection during neutropenic period  Description  INTERVENTIONS:  - Monitor WBC    10/28/2019 1250 by Brie Appiah RN  Outcome: Completed  10/28/2019 0738 by Brie Appiah RN  Outcome: Progressing     Problem: SAFETY ADULT  Goal: Maintain or return to baseline ADL function  Description  INTERVENTIONS:  -  Assess patient's ability to carry out ADLs; assess patient's baseline for ADL function and identify physical deficits which impact ability to perform ADLs (bathing, care of mouth/teeth, toileting, grooming, dressing, etc )  - Assess/evaluate cause of self-care deficits   - Assess range of motion  - Assess patient's mobility; develop plan if impaired  - Assess patient's need for assistive devices and provide as appropriate  - Encourage maximum independence but intervene and supervise when necessary  - Involve family in performance of ADLs  - Assess for home care needs following discharge   - Consider OT consult to assist with ADL evaluation and planning for discharge  - Provide patient education as appropriate  10/28/2019 1250 by Scottie Luu RN  Outcome: Completed  10/28/2019 0738 by Scottie Luu RN  Outcome: Progressing  Goal: Maintain or return mobility status to optimal level  Description  INTERVENTIONS:  - Assess patient's baseline mobility status (ambulation, transfers, stairs, etc )    - Identify cognitive and physical deficits and behaviors that affect mobility  - Identify mobility aids required to assist with transfers and/or ambulation (gait belt, sit-to-stand, lift, walker, cane, etc )  - [place] fall precautions as indicated by assessment  - Record patient progress and toleration of activity level on Mobility SBAR; progress patient to next Phase/Stage  - Instruct patient to call for assistance with activity based on assessment  - Consider rehabilitation consult to assist with strengthening/weightbearing, etc   10/28/2019 1250 by Scottie Luu RN  Outcome: Completed  10/28/2019 0738 by Scottie Luu RN  Outcome: Progressing     Problem: DISCHARGE PLANNING - CARE MANAGEMENT  Goal: Discharge to post-acute care or home with appropriate resources  Description  INTERVENTIONS:  - Conduct assessment to determine patient/family and health care team treatment goals, and need for post-acute services based on payer coverage, community resources, and patient preferences, and barriers to discharge  - Address psychosocial, clinical, and financial barriers to discharge as identified in assessment in conjunction with the patient/family and health care team  - Arrange appropriate level of post-acute services according to patients   needs and preference and payer coverage in collaboration with the physician and health care team  - Communicate with and update the patient/family, physician, and health care team regarding progress on the discharge plan  - Arrange appropriate transportation to post-acute venues  10/28/2019 1250 by Be Stewart RN  Outcome: Completed  10/28/2019 0738 by Be Stewart RN  Outcome: Progressing     Problem: Nutrition/Hydration-ADULT  Goal: Nutrient/Hydration intake appropriate for improving, restoring or maintaining nutritional needs  Description  Monitor and assess patient's nutrition/hydration status for malnutrition  Collaborate with interdisciplinary team and initiate plan and interventions as ordered  Monitor patient's weight and dietary intake as ordered or per policy  Utilize nutrition screening tool and intervene as necessary  Determine patient's food preferences and provide high-protein, high-caloric foods as appropriate       INTERVENTIONS:  - Monitor oral intake, urinary output, labs, and treatment plans  - Assess nutrition and hydration status and recommend course of action  - Evaluate amount of meals eaten  - Assist patient with eating if necessary   - Allow adequate time for meals  - Recommend/ encourage appropriate diets, oral nutritional supplements, and vitamin/mineral supplements  - Order, calculate, and assess calorie counts as needed  - Recommend, monitor, and adjust tube feedings and TPN/PPN based on assessed needs  - Assess need for intravenous fluids  - Provide specific nutrition/hydration education as appropriate  - Include patient/family/caregiver in decisions related to nutrition  10/28/2019 1250 by Be Stewart RN  Outcome: Completed  10/28/2019 0738 by Be Stewart RN  Outcome: Progressing

## 2019-10-28 NOTE — SOCIAL WORK
Met with Pt to review rehab routine, and CM role  Pt shared that there are 0STE her home, as her  ( 1 5yrs ago) was a vet and the [place] installed a ramp with turns/spots to rest   Pt is also connected to her daughters home, through the living room  Pts grandchildren, 17yo female, 14yo female, and an 6yo male, are also very helpful to Pt  Pt reports her grandson will help her put on her shoes/socks in the morning, as well as assist with reaching items in the cabinets  Pt is connected to a [employer] community, and they will assist her immediately at d/c  Pt has dealt with [facility] and outpt services at [address] [facility]  Pt was most recently involved with [facility], and may have a case on hold  Pt utilized [facility] for her long term medication needs, and [facility], for short term meds  CM shared about [facility] meds to beds program   Pt understands the team meeting process, as well as potential LOS  Following to assist with d/c planning needs, and cont'd stay review due 11 4

## 2019-10-28 NOTE — ASSESSMENT & PLAN NOTE
Wt Readings from Last 3 Encounters:   10/28/19 76 kg (167 lb 8 8 oz)   10/27/19 81 9 kg (180 lb 8 9 oz)   10/21/19 87 5 kg (192 lb 14 4 oz)       Patient is diuresing well  Her weight today is 83 3 kilos and not 76 kilos  That was an error  Her weight has not changed much but she appears to be euvolemic today  Discussed with Nephrology  Secondary to her worsening renal status and worsening bicarb will decrease torsemide to 20 mg daily and observe for now  Continue BiPAP in the evening    Continue to monitor electrolytes and daily weights  Check BMP tomorrow

## 2019-10-28 NOTE — ASSESSMENT & PLAN NOTE
Bioprosthetic valve  Last echo was 10/21/2019  Showing moderate left ventricle systolic dysfunction with an EF 41%  Severe hypokinesis to akinesis in the distal half of the anterior, lateral and inferior walls with akinesis of the distal 3rd of the septum  The apex is akinetic to paradoxical  The  base of the heart contracts vigorously  Although these findings could be secondary to coronary artery disease, they also are consistent with Takotsubo's syndrome  Grade 2 left ventricle diastolic dysfunction  Moderate to severe MR  Calcified tricuspid aortic valve  Moderate TR    [place]ild [place]

## 2019-10-28 NOTE — H&P
PHYSICAL MEDICINE AND REHABILITATION H&P/ADMISSION NOTE  Sherryle Kelp 80 y o  female MRN: [id number]  Unit/Bed#: [facility] 268-02 Encounter: [id number]     Rehab Diagnosis: Impairment of mobility, safety, Activities of Daily Living (ADLs), and cognitive/communication skills due to Brain Dysfunction:  02 22  Traumatic, Closed Injury    History of Present Illness:   Sherryle Kelp is a 80 y o  female who presented to the [address] [place] [address] with generalized weakness  Patient formally been admitted from October 8th and discharged home on October 10th after fall at home  At the time workup was significant for subdural hematoma  Conservative management was recommended that time  Patient then re-presented on 10/12/19 with weakness and gait instability  During the 2nd admission, patient needed up with acute respiratory failure, requiring admission to ICU  Pulmonary function eventually did improve to the point where patient was able to be discharged to the [facility] on 10/19/19  Unfortunately patient's respiratory function decline, patient found to be in significant respiratory distress on October 21st, necessitated discharge back to acute hospital   Patient was found to have an elevated BNP of 45037  In addition imaging was positive for evidence of overload  Patient was diuresed with torsemide  Eventually pulmonary function improved to the point where patient could return to the [facility]  She was admitted to [facility] on 10/28/19  Subjective:  Patient seen examined at bedside  She denies any chest pain shortness of breath  She reports feeling much better  She is excited to resume therapies  She denies any paresthesias  No bowel or bladder dysfunction  Review of Systems: A 10-point review of systems was performed  Negative except as listed above      Plan:     * SDH (subdural hematoma) ([facility])  Assessment & Plan  · Conservative management was recommended  · Patient with a right subdural hematoma  · Neurosurgery service to be contacted regarding resumption of Plavix, which has been held since patient subdural hematoma  · It appears patient now on heparin subcu for DVT prophylaxis, unclear if neurosurgery cleared based on CT scan from 10/21/19    Renal artery stenosis [facility])  Assessment & Plan  · Nephrology service is following  · Patient may require repeat ultrasound with pain medications    AZUL (obstructive sleep apnea)  Assessment & Plan  · Continue BiPAP at night  · Will consult pulmonology closer to discharge to evaluate for DME needs for BiPAP    Chronic anemia  Assessment & Plan  Results from last 7 days   Lab Units 10/23/19  0451 10/22/19  1242 10/22/19  0458   HEMOGLOBIN g/dL 8 1* 8 0* 7 9*     · Monitor CBC intermittently (repeat tomorrow)  · Transfuse for Hgb <7    Depression  Assessment & Plan  · Continue sertraline    Acute on chronic diastolic heart failure ([facility] [address] )  Assessment & Plan  Wt Readings from Last 3 Encounters:   10/28/19 83 9 kg (184 lb 15 5 oz)   10/28/19 76 kg (167 lb 8 8 oz)   10/27/19 81 9 kg (180 lb 8 9 oz)       · Monitor daily weights  · Patient now on torsemide 20 mg daily      Essential hypertension  Assessment & Plan  Temp:  [97 8 °F (36 6 °C)-99 3 °F (37 4 °C)] 98 5 °F (36 9 °C)  HR:  [62-70] 68  Resp:  [18-20] 18  BP: ()/(54-65) 113/54    · Continue Metoprolol   · IM service managing anti-hypertensives, adjusting as needed    H/O: CVA (cerebrovascular accident)  Assessment & Plan  · Patient was formally on Plavix, continue to hold until cleared by Neurosurgery Service  · [facility] team to review CT of the head to determine if Plavix can be restarted  · Heparin has already been restarted      Hypothyroidism  Assessment & Plan  · Continue Synthroid    # Skin  · Encourage regular turning as patient at risk for skin breakdown  · Staff to continue patient education on Q2h turning  · Rehabilitation team to perform skin checks regularly     # Bowel  · Patient reports no constipation  · To ensure regular BMs, bowel regimen consisting of:  colace , dulcolax suppository  and miralax     # Bladder  · Patient voiding spontaneously    # Pain  · Continue tylenol, for max of 3gm daily  # Rehab Psych   · There are no psychological or psychiatric problems identified    # Other  - Diet/Nutrition:        Diet Orders   (From admission, onward)             Start     Ordered    10/28/19 1323  Diet Cardiovascular; Cardiac; Fluid Restriction 1200 ML, Consistent Carbohydrate Diet Level 2 (5 carb servings/75 grams CHO/meal)  Diet effective now     Question Answer Comment   Diet Type Cardiovascular    Cardiac Cardiac    Other Restriction(s): Fluid Restriction 1200 ML    Other Restriction(s): Consistent Carbohydrate Diet Level 2 (5 carb servings/75 grams CHO/meal)    RD to adjust diet per protocol?  Yes        10/28/19 1323              - DVT prophy: Sequential compression device (Venodyne)  and Heparin  - GI ppx: Pantaprazole  - Nausea: None  - Supplements: None  - Sleep: None    Disposition: TBD    CODE: Level 1: Full Code Drug regimen reviewed, all potential adverse effects identified and addressed:    Scheduled Meds:    Current Facility-Administered Medications:  acetaminophen 650 mg Oral Q6H PRN Anton Shrestha MD   albuterol 2 puff Inhalation Q4H PRN Anton Shrestha MD   gabapentin 300 mg Oral TID Anton Shrestha MD   heparin (porcine) 5,000 Units Subcutaneous Q8H 800 [employer] MD Radha   [START ON 10/29/2019] levothyroxine 112 mcg Oral Daily Anton Shrestha MD   lidocaine 3 patch Topical Daily PRN Anton Shrestha MD   methocarbamol 500 mg Oral Q6H PRN Anton Shrestha MD   metoprolol tartrate 12 5 mg Oral Q12H [address] Anton Shrestha MD   nystatin  Topical BID Anton Shrestha MD   [START ON 10/29/2019] pantoprazole 40 mg Oral Daily Before Breakfast Anton Shrestha MD   polyethylene glycol 17 g Oral Daily PRN Fausto Mejia MD   sertraline 25 mg Oral HS Fausto Mejia MD   [START ON 10/29/2019] torsemide 20 mg Oral Daily Abeba Hamilton MD        Restrictions include:  none Fall precautions      Functional History - Prior to Admission:      Functional Status: Patient was independent with mobility/ambulation, transfers, ADL's, IADL's  Functional Status Upon Admission to [facility]:  Mobility: supervision/CGA 40'  Transfers: supervision  ADLs: mod-max assist (especially lowers)    Theodora Siemens lives with their family  She lives in [facility]) single family home  The living area: can live on one level  Equipment in home: [address], [address] [facility] [place] [employer] and walk in shower  There 3 steps to enter the home  Patient/family's goals: Return to previous home/apartment  The patient will not have 24 hour supervision/physical assistance available upon discharge      Physical Exam:  Temp:  [97 8 °F (36 6 °C)-99 3 °F (37 4 °C)] 98 5 °F (36 9 °C)  HR:  [62-70] 68  Resp:  [18-20] 18  BP: ()/(54-65) 113/54  SpO2:  [92 %-99 %] 96 %    General: alert, no apparent distress, cooperative and comfortable  HEENT:  Head: Normocephalic, no lesions, without obvious abnormality  LUNGS:  no abnormal respiratory pattern, no retractions noted, non-labored breathing  ABDOMEN:  soft, non-tender  Bowel sounds normal  No masses, no organomegaly  EXTREMITIES:  edema 1+ edema bilateral LEs L>R  NEURO:   mental status, speech normal, alert and oriented x3  PSYCH:  Alert and oriented, appropriate affect    MMT:   Strength:   Right  Left  Site  Right  Left  Site    5 5  S Ab: Shoulder Abductors  5  5  HF: Hip Flexors    5 5  EF: Elbow Flexors  5  5 KF: Knee Flexors    5  5  EE: Elbow Extensors  5  5  KE: Knee Extensors    5  5  WE: Wrist Extensors  5  5  DR: Dorsi Flexors    5  5  FF: Finger Flexors  5  5  PF: Plantar Flexors    5  5  HI: Hand Intrinsics  5  5  EHL: Extensor Hallucis Longus     Laboratory:    Results from last 7 days   Lab Units 10/23/19  0451 10/22/19  1242 10/22/19  0458   HEMOGLOBIN g/dL 8 1* 8 0* 7 9* HEMATOCRIT % 24 1* 24 0* 23 4*   WBC Thousand/uL 6 50 6 20 6 90     Results from last 7 days   Lab Units 10/28/19  0433 10/27/19  0446 10/26/19  0641  10/22/19  0458  10/21/19  1853   BUN mg/dL 63* 60* 57*   < > 52*  --  48*   SODIUM mmol/L 135* 136* 136*   < > 133*  --  134*   POTASSIUM mmol/L 3 7 4 0 4 1   < > 4 5   < > 5 2*   CHLORIDE mmol/L 90* 93* 93*   < > 94*  --  91*   CREATININE mg/dL 1 95* 1 90* 1 83*   < > 1 63*  --  1 90*   AST U/L  --   --   --   --  50*  --  41*   ALT U/L  --   --   --   --  32  --  26    < > = values in this interval not displayed  Results from last 7 days   Lab Units 10/21/19  1853   PROTIME seconds 10 3   INR  0 94        Wt Readings from Last 1 Encounters:   10/28/19 83 9 kg (184 lb 15 5 oz)     Estimated body mass index is 37 36 kg/m² as calculated from the following:    Height as of this encounter: 4' 11" (1 499 m)  Weight as of this encounter: 83 9 kg (184 lb 15 5 oz)  Imaging: reviewed     Rehabilitation Prognosis: good     Tolerance for three hours of therapy a day: good     Family/Patient Goals:  Patient/family's goals: Return to previous home/apartment  Patient will receive PT, OT, and ST 60 minutes each per day, five days per week to achieve rehab goals or participate in 900 minutes of therapy within a 7 day week period  Mobility Goals: Modified [place]  Transfer Goals: Modified [place]  Activities of Daily Living (ADLs) Goals: Modified [place]    Discharge Planning:  Rehabilitation and discharge goals discussed with the patient and/or family  Case Managment and Social Work to review patient/family resources and to coordinate Discharge Planning  Estimated length of stay: 10 to 14 days    Patient and Family Education and Training:  Rehabilitation and discharge goals discussed with the patient and/or family  Patient/family education/training needs to be discussed in weekly team meeting      Equipment/DME needs: Therapy teams to assess and evaluate for additional equipment/DME needs throughout rehabilitation stay    Past Medical History:   Past Surgical History:   Family History:   Social history:   Past Medical History:   Diagnosis Date    Arthritis     Breast cancer ([facility] [address] ) 2015    Cardiac disease     aortic valve transplant    CHF (congestive heart failure) ([facility] [address] )     Compression fracture of body of thoracic vertebra ([facility] [address] )     CVA (cerebral vascular accident) ([facility] [address] )     Diabetes mellitus [facility]     Disease of thyroid gland     Fibromyalgia, primary     H/O cervical fracture 01/09/2019    Hyperlipidemia     Hypertension     Neuropathy     AZUL (obstructive sleep apnea) 10/19/2019    Pressure injury of skin     Renal disorder     kidney stent    Stroke [facility])     Past Surgical History:   Procedure Laterality Date    AORTIC VALVE SURGERY      BREAST LUMPECTOMY Right     BREAST LUMPECTOMY Left     BREAST SURGERY      lumpectomy for breast cancer    CATARACT EXTRACTION      CHOLECYSTECTOMY      HYSTERECTOMY  1978    JOINT REPLACEMENT      KIDNEY SURGERY      stent placed for kidney    OTHER SURGICAL HISTORY      abdominal aneurysm surgery    PA ARTHRODESIS POSTERIOR ATLAS-AXIS C1-C2 N/A 5/1/2019    Procedure: C1-C2 lateral mass fixation fusion with possible sublaminar cables;  Surgeon: Marquise Corona MD;  [facility];  Service: Neurosurgery    REPLACEMENT TOTAL KNEE      left      Family History   Problem Relation Age of Onset    Heart disease Mother     Arthritis Mother     Diabetes Mother     Heart failure Father     Arthritis Father     Other Father         enlargement of prostate     Dementia Father     No Known Problems Daughter     No Known Problems Maternal Grandmother     No Known Problems Maternal Grandfather     No Known Problems Paternal Grandmother     No Known Problems Paternal Grandfather     No Known Problems Maternal Aunt     No Known Problems Maternal Aunt     No Known Problems Maternal Aunt     No Known Problems Maternal Aunt     No Known Problems Paternal Aunt     No Known Problems Paternal Aunt       Social History     Socioeconomic History    Marital status:       Spouse name: Not on file    Number of children: 3    Years of education: Not on file    Highest education level: Not on file   Occupational History    Not on file   Social Needs    Financial resource strain: Not on file    Food insecurity:     Worry: Not on file     Inability: Not on file    Transportation needs:     Medical: Not on file     Non-medical: Not on file   Tobacco Use    Smoking status: Never Smoker    Smokeless tobacco: Never Used   Substance and Sexual Activity    Alcohol use: Yes     Frequency: Monthly or less     Drinks per session: 1 or 2     Binge frequency: Less than monthly    Drug use: Never    Sexual activity: Not Currently   Lifestyle    Physical activity:     Days per week: Not on file     Minutes per session: Not on file    Stress: Not on file   Relationships    Social connections:     Talks on phone: Not on file     Gets together: Not on file     Attends Rastafarian service: Not on file     Active member of club or organization: Not on file     Attends meetings of clubs or organizations: Not on file     Relationship status: Not on file    Intimate partner violence:     Fear of current or ex partner: Not on file     Emotionally abused: Not on file     Physically abused: Not on file     Forced sexual activity: Not on file   Other Topics Concern    Not on file   Social History Narrative    ** Merged History Encounter **               Current Medical Diagnosis Allergies   Patient Active Problem List   Diagnosis    Hypothyroidism    H/O: CVA (cerebrovascular accident)    Essential hypertension    Acute on chronic diastolic heart failure ([facility] [address] )    Depression    History of aortic valve replacement    Acute-on-chronic kidney injury ([facility] [address] )    Chronic anemia    SDH (subdural hematoma) ([facility] )    AZUL (obstructive sleep apnea)    Hyperlipidemia    Acute on chronic respiratory failure with hypoxia (HCC)    Controlled type 2 diabetes mellitus with diabetic neuropathy, without long-term current use of insulin (HCC)    RA (rheumatoid arthritis) ([facility] )    SLE [facility]ic lupus erythematosus) [facility]    Type 2 MI (myocardial infarction) ([facility] )    H/O spinal fusion    Renal artery stenosis (HCC)    History of malignant neoplasm of breast    Morbid obesity ([facility]    Osteoporosis    Peripheral polyneuropathy    Vitamin D deficiency    Allergies   Allergen Reactions    Duloxetine Hcl Other (See Comments) and Hypertension    Duloxetine Hcl      Cymbalta; Hemorrhagic stroke listed as reaction    Escitalopram      Other reaction(s): Urinary Retention    Pregabalin      Other reaction(s): Hypertension    Statins Myalgia    Tramadol      Other reaction(s): Hypertension    Triprolidine-Pse Other (See Comments)    Anastrozole Abdominal Pain    Anastrozole     Antihistamines, Diphenhydramine-Type     Exemestane      Aromasin; Muscle pain & cramps    Lexapro [Escitalopram]      Urinary retention    Lyrica [Pregabalin]      hypertension    Metformin      diarrhea    Oxycodone      Pt unsure      Statins      Muscle pain & cramps    Tramadol      hypertension    Triprolidine-Pseudoephedrine     Metformin Diarrhea           Medical Necessity Criteria for [facility] Admission: Acute Kidney Injury, Anemia, with the following plan: monitor H/H, Hypertension, Bowel/Bladder Management and Congestive Heart Failure (CHF) exacerbation  In addition, the preadmission screen, post-admission physical evaluation, overall plan of care and admissions order demonstrate a reasonable expectation that the following criteria were met at the time of admission to the [facility]    1  The patient requires active and ongoing therapeutic intervention of multiple therapy disciplines (physical therapy, occupational therapy, speech-language pathology, or prosthetics/orthotics), one of which is physical or occupational therapy  2  Patient requires an intensive rehabilitation therapy program, as defined in Chapter 1, section 110 2 2 of the CMS Medicare Policy Manual  This intensive rehabilitation therapy program will consist of at least 3 hours of therapy per day at least 5 days per week or at least 15 hours of intensive rehabilitation therapy within a 7 consecutive day period, beginning with the date of admission to the [facility]  3  The patient is reasonably expected to actively participate in, and benefit significantly from, the intensive rehabilitation therapy program as defined in Chapter 1, section 110 2 2 of the CMS Medicare Policy Manual at this time of admission to the [facility]  She can reasonably be expected to make measurable improvement (that will be of practical value to improve the patients functional capacity or adaptation to impairments) as a result of the rehabilitation treatment, as defined in section 110 3, and such improvement can be expected to be made within the prescribed period of time  As noted in the CMS Medicare Policy Manual, the patient need not be expected to achieve complete independence in the domain of self-care nor be expected to return to his or her prior level of functioning in order to meet this standard  4  The patient must require physician supervision by a rehabilitation physician  As such, a rehabilitation physician will conduct face-to-face visits with the patient at least 3 days per week throughout the patients stay in the [facility] to assess the patient both medically and functionally, as well as to modify the course of treatment as needed to maximize the patients capacity to benefit from the rehabilitation process    5  The patient requires an intensive and coordinated interdisciplinary approach to providing rehabilitation, as defined in Chapter 1, section 110 2 5 of the CMS Medicare Policy Manual  This will be achieved through periodic team conferences, conducted at least once in a 7-day period, and comprising of an interdisciplinary team of medical professionals consisting of: a rehabilitation physician, registered nurse,  and/or , and a licensed/certified therapist from each therapy discipline involved in treating the patient  Changes Since Pre-admission Assessment: None -This patient's participation in rehab continues to be reasonable, necessary and appropriate  CMS Required Post-Admission Physician Evaluation Elements  History and Physical, including medical history, functional history and active comorbidities as in above text      Post-Admission Physician Evaluation:  The patient has the potential to make improvement and is in need of physical, occupational, and/or therapy services  The patient may also need nutritional services  Given the patient's complex medical condition and risk of further medical complications, rehabilitative services cannot be safely provided at a lower level of care, such as a skilled nursing facility  I have reviewed the patient's functional and medical status at the time of the preadmission screening and they are the same as on the day of this admission  I acknowledge that I have personally performed a full physical examination on this patient within 24 hours of admission  The patient and/or family demonstrated understanding the rehabilitation program and the discharge process after we discussed them      Agree in entirety: yes  Minor adaptions: none    Major changes: none     Katty Dumont MD  Physical Medicine and Rehabilitation    ** Please Note: Fluency Direct voice to text software may have been used in the creation of this document   **

## 2019-10-28 NOTE — ASSESSMENT & PLAN NOTE
Temp:  [97 °F (36 1 °C)-98 7 °F (37 1 °C)] 97 °F (36 1 °C)  HR:  [60-63] 63  Resp:  [20-21] 21  BP: (102-138)/(52-71) 124/57    · Continue Metoprolol   · IM service managing anti-hypertensives, adjusting as needed

## 2019-10-28 NOTE — PHYSICAL THERAPY NOTE
Physical Therapy Treatment note    Time in/out [phone]       10/28/19 0802   Pain Assessment   Pain Assessment 0-10   Pain Score No Pain   Restrictions/Precautions   Weight Bearing Precautions Per Order No   Other Precautions O2;Fall Risk   General   Chart Reviewed Yes   Family/Caregiver Present No   Cognition   Arousal/Participation Cooperative   Attention Within functional limits   Following Commands Follows all commands and directions without difficulty   Subjective   Subjective My toes feel like cement underneath  Transfers   Sit to Stand 5  Supervision   Additional items Assist x 1; Increased time required;Armrests   Stand to Sit 5  Supervision   Additional items Assist x 1; Increased time required;Armrests   Stand pivot 5  Supervision   Additional items Assist x 1; Increased time required;Verbal cues   Ambulation/Elevation   Gait pattern Excessively slow; Short stride;Decreased foot clearance   Gait Assistance   (CGA)   Additional items Assist x 1   Assistive Device Rolling walker   Distance 40 feet   Balance   Static Sitting Good   Static Standing Fair   Ambulatory Fair   Activity Tolerance   Activity Tolerance Patient tolerated treatment well   Nurse Made Aware RN clears patient for treatment   Assessment   Prognosis Good   Problem List Decreased strength;Decreased range of motion;Decreased endurance; Impaired balance;Decreased mobility; Decreased coordination; Impaired sensation;Obesity   Assessment Pt is agreeable for treatment  She reports she will be having MRI this afternoon  Pt is seated OOB in chair on arrival   Pt demonstrates stable SpO2 sats at rest and with short distance ambulation  SpO2 92-93% on rrom air, HR resting 69 bpm and post ambulation 80 bpm  She tolerated LE TE for 10 -20 reps  TE consist of ankle DF/PF x 20, hip IR/ER x 10, Hip abd/add x 10, LAQ's with 3 sec hold  All TE to BLE  Upgrade goals as patient has achieved transfer and ambulation goal   New goals as follows:  Pt will perform sit to stand from bed/chair and BSC with CS  Pt will ambulate 75 feet x 2 with RW CS with stable cardiopulmonary parameters  Continue with bed mobility goals as patient is OOB in chair on arrival  POC update and extended through 11/1/19   Barriers to Discharge Inaccessible home environment   Goals   Patient Goals to get better   STG Expiration Date 11/01/19   Short Term Goal #1 Pt will perform sit to stand from bed/chair and BSC with CS  Pt will ambulate 75 feet x 2 with RW CS with stable cardiopulmonary parameters  Continue with bed mobility goals as patient is OOB in chair on arrival    PT Treatment Day 3   Plan   Treatment/Interventions Functional transfer training;LE strengthening/ROM; Therapeutic exercise; Endurance training;Patient/family training;Gait training;Spoke to nursing   Progress Progressing toward goals   PT Frequency Other (Comment)  (3-5x/wk)   Recommendation   Recommendation Post acute IP rehab   Equipment Recommended   (Pt personal walker in room)   PT - OK to Discharge Yes   Additional Comments   (if going to rehab )     Ehrhardt Medico, PT

## 2019-10-28 NOTE — ASSESSMENT & PLAN NOTE
Results from last 7 days   Lab Units 11/04/19  0429 10/31/19  1300 10/30/19  0538   HEMOGLOBIN g/dL 8 9* 9 3* 7 6*     · Monitor CBC intermittently   · Transfuse for Hgb <7  · Underwent another transfusion on 10/30, with appropriate response noted in H/h

## 2019-10-28 NOTE — PROGRESS NOTES
Follow up Consultation    Nephrology   Noel Cabello 80 y o  female MRN: [id number]  Unit/Bed#: 7T [facility] 711-02 Encounter: [id number]      Physician Requesting Consult: Misty Robles MD  Reason for Consult:  Acute kidney injury       ASSESSMENT/PLAN:    1)Acute kidney injury (POA) on CKD stage 3:  - HERLINDA most likely secondary to ischemic injury from hemodynamic perturbations plus renal venous congestion  - creatinine was improving with diuresis and now starting to trend up  - After review of records it appears that the patient has a baseline Creatinine of 1 5-1 6 mg/dL  - patient's creatinine today is at 1 95 mg/dL  - Avoid nephrotoxins, adjust meds to appropriate GFR   - Acid base and lytes stable except mild hyponatremia and alkalosis  - clinically patient appears to be euvolemic to hypovolemic  - will decrease torsemide to 20 mg p o  Q day from today  - concern for needing imaging for renal artery stenosis  Patient has a history of a stent on 1 side  At this time she is not able to get vascular Dopplers imaging, MRA was ordered however discussed with the patient the risks for NSF in light of acute kidney injury and that performing the test will not change her current management  She is agreeable and does not wish to go ahead with MRA   - Clinically patient is not uremic and there is no acute indication for renal replacement therapy (dialysis)  - Optimize hemodynamic status to avoid delay in renal recovery  - check BMP, magnesium, phosphorus in a m   - Await renal recovery  - Place on a renal diet when allowed diet order    - Strict I/O  2)Blood pressure:  - Optimize hemodynamics   - Maintain MAP > 65mmHg  - Avoid BP fluctuations    - on metoprolol 25 mg p o  B i d     3)H/H/anemia:  - most recent hemoglobin at 8 1 grams/deciliter  - maintain hemoglobin greater than 8 grams/deciliter    4)Volume status:  - Clinically patient appears to be euvolemic to hypovolemic  - changed from IV Lasix to oral torsemide 40 mg p o  B i d  From 10/27/2019  - decrease torsemide to 20 mg p o  Q day from 10/28/2019 monitor daily for dosage of diuretic adjustment at this time    5)Hyponatremia:  - Likely due to decreased free water clearance  - Likely due to decreased distal sodium delivery  - Continue to monitor for now, most recent sodium 135 mEq    6)CKD:  - Most likely secondary to renal vascular disease plus age-related nephron loss  - will arrange for follow up post hospitalization   - follows with Dr Sunny Blevins for nephrology as an outpatient    7) Subdural hematoma:  - management as per Primary team    8)CHF:  - Management as per primary team  - most recent EF of 41%  - change over to p o  diuretics from 10/27/2019   - follow-up with cardiology    Thanks for the consult  Will continue to follow  Please call with questions/ concerns  Above-mentioned orders and Plan with regards to renal artery stenosis/MRA and acute kidney injury was discussed with the team in [address] MD, [facility], 10/28/2019, 8:16 AM              Objective :   Hemodynamically stable remains afebrile, urine output close to 1 2 L in last 24 hours, rechecking weights as initial weight from today inaccurate  Patient seen and examined in her room no overnight events  PHYSICAL EXAM  /65 (BP Location: Right arm)   Pulse 62   Temp 97 8 °F (36 6 °C) (Temporal)   Resp 18   Ht 4' 11" (1 499 m)   Wt 76 kg (167 lb 8 8 oz)   SpO2 95%   BMI 33 84 kg/m²   Temp (24hrs), Av 7 °F (36 5 °C), Min:97 3 °F (36 3 °C), Max:97 9 °F (36 6 °C)        Intake/Output Summary (Last 24 hours) at 10/28/2019 0816  Last data filed at 10/28/2019 0625  Gross per 24 hour   Intake 1020 ml   Output 1200 ml   Net -180 ml       I/O last 24 hours:   In: 1020 [P O :1020]  Out: 1200 [Urine:1200]      Current Weight: Weight - Scale: 76 kg (167 lb 8 8 oz)  First Weight: Weight - Scale: 92 4 kg (203 lb 11 3 oz)  Physical Exam   Constitutional: She is oriented to person, place, and time  She appears well-developed and well-nourished  No distress  HENT:   Head: Normocephalic and atraumatic  Dry mucous membranes   Eyes: Conjunctivae are normal  No scleral icterus  Neck: Neck supple  No JVD present  Cardiovascular: Normal heart sounds  Exam reveals no friction rub  Pulmonary/Chest:   No wheezing mild diffuse crackles   Abdominal: She exhibits no mass  There is no tenderness  Musculoskeletal: She exhibits no edema  Neurological: She is alert and oriented to person, place, and time  Skin: Skin is warm and dry  She is not diaphoretic  Poor skin turgor   Psychiatric: She has a normal mood and affect  Her behavior is normal    Nursing note and vitals reviewed  Review of Systems   Constitutional: Negative for appetite change, fatigue and fever  HENT: Negative for congestion and sore throat  Respiratory: Negative for cough, shortness of breath and wheezing  Cardiovascular: Negative for chest pain, palpitations and leg swelling  Gastrointestinal: Negative for abdominal pain, diarrhea, nausea and vomiting  Genitourinary: Negative for flank pain  Musculoskeletal: Negative for back pain  Neurological: Negative for dizziness and headaches  Psychiatric/Behavioral: Negative for confusion  All other systems reviewed and are negative        Scheduled Meds:  Current Facility-Administered Medications:  acetaminophen 650 mg Oral Q6H PRN [facility], CRNP   gabapentin 300 mg Oral TID Luisa Lanza MD   heparin (porcine) 5,000 Units Subcutaneous [facility], CRMONET   HYDROcodone-acetaminophen 1 tablet Oral Q6H PRN [facility], CRNP   HYDROcodone-acetaminophen 2 tablet Oral Q6H PRN [facility], NATHEN   ipratropium 0 5 mg Nebulization Q6H [facility], CRNP   levalbuterol 1 25 mg Nebulization Q6H [facility], CRNP   levothyroxine 112 mcg Oral Daily [facility], CRNP   lidocaine 3 patch Topical Daily PRN Luisa Lanza MD   methocarbamol 500 mg Oral Q6H PRN Luisa Lanza MD metoprolol tartrate 25 [facility]12H [facility]   nystatin  Topical BID [facility]   pantoprazole 40 mg Oral Early Morning [facility]   polyethylene glycol 17 g Oral Daily PRN [facility], NATHEN   sertraline 25 mg Oral HS [facility]   torsemide 40 mg Oral Daily Luisa Lanza MD       PRN Meds:   acetaminophen    HYDROcodone-acetaminophen    HYDROcodone-acetaminophen    lidocaine    methocarbamol    polyethylene glycol    Continuous Infusions:       Invasive Devices: Invasive Devices     Peripherally Inserted Central Catheter Line            PICC Line 10/22/19 Left Brachial 5 days                  LABORATORY:    Results from last 7 days   Lab Units 10/28/19  0433 10/27/19  0446 10/26/19  0641 10/25/19  0457 10/24/19  0428 10/23/19  0451 10/22/19  1242 10/22/19  0458  10/21/19  1853   WBC Thousand/uL  --   --   --   --   --  6 50 6 20 6 90  --  11 80*   HEMOGLOBIN g/dL  --   --   --   --   --  8 1* 8 0* 7 9*  --  9 7*   HEMATOCRIT %  --   --   --   --   --  24 1* 24 0* 23 4*  --  30 1*   PLATELETS Thousands/uL  --   --   --   --   --  202 195 204  --  261   POTASSIUM mmol/L 3 7 4 0 4 1 4 3 3 7 4 1  --  4 5   < > 5 2*   CHLORIDE mmol/L 90* 93* 93* 95* 96* 95*  --  94*  --  91*   CO2 mmol/L 37* 36* 36* 37* 34* 33*  --  32*  --  30   BUN mg/dL 63* 60* 57* 55* 52* 51*  --  52*  --  48*   CREATININE mg/dL 1 95* 1 90* 1 83* 1 69* 1 78* 1 84*  --  1 63*  --  1 90*   CALCIUM mg/dL 8 9 9 1 9 4 9 1 8 8 8 9  --  8 7  --  9 4   MAGNESIUM mg/dL  --  2 1  --   --   --  2 3  --  2 1  --  2 4*   PHOSPHORUS mg/dL  --   --   --   --   --  4 4  --  5 9*  --  7 9*    < > = values in this interval not displayed  rest all reviewed    RADIOLOGY:  XR chest portable   Final Result by Rito Oconnell DO (10/26 7038)      Mild pulmonary vascular congestion with small bilateral pleural effusions              Workstation performed: [id number]         XR chest PICC line portable   Final Result by Saurav Nolan MD (10/22 1319)   1  Interval insertion of a left-sided PICC line with tip directed laterally in the region of the SVC  2   Persistent bibasilar opacities compatible small effusions and adjacent atelectasis or infection  3   Pulmonary vascular congestion  Workstation performed: [id number]         VAS lower limb venous duplex study, complete bilateral   Final Result by Antonio Olmstead MD (10/22 1151)      CT head wo contrast   Final Result by Marj Saldivar DO (10/21 2150)      Minimal residual right parafalcine  hemorrhage in the inferior aspect of the falx  Workstation performed: [id number]         XR chest portable ICU   Final Result by Pleasant Goodpasture, DO (10/21 2054)      Mild central pulmonary vascular congestion and small basilar effusions, grossly unchanged  Workstation performed: [id number]         MRI inpatient order    (Results Pending)     Rest all reviewed    Portions of the record may have been created with voice recognition software  Occasional wrong word or "sound a like" substitutions may have occurred due to the inherent limitations of voice recognition software  Read the chart carefully and recognize, using context, where substitutions have occurred  If you have any questions, please contact the dictating provider

## 2019-10-28 NOTE — ASSESSMENT & PLAN NOTE
Wt Readings from Last 3 Encounters:   10/28/19 83 9 kg (184 lb 15 5 oz)   10/28/19 76 kg (167 lb 8 8 oz)   10/27/19 81 9 kg (180 lb 8 9 oz)     Patient with acute on chronic diastolic CHF  She did require BiPAP, IV diuresis during her ICU stay  Patient did diurese well  Patient appears euvolemic today  She will continue her torsemide 20 mg daily  This was reduced secondary to worsening bicarb and renal function  Do need to continue to follow closely  Recommend daily weights with the same scale    Last echo was 10/21/2019  Showing moderate left ventricle systolic dysfunction with an EF 41%  Severe hypokinesis to akinesis in the distal half of the anterior, lateral and inferior walls with akinesis of the distal 3rd of the septum  The apex is akinetic to paradoxical  The  base of the heart contracts vigorously  Although these findings could be secondary to coronary artery disease, they also are consistent with Takotsubo's syndrome  Grade 2 left ventricle diastolic dysfunction  Moderate to severe MR  Calcified tricuspid aortic valve  Moderate TR    Mild OR

## 2019-10-28 NOTE — SOCIAL WORK
SW received confirmation from [facility] liaison that Jenn Lee was finally received  Pt to admit today  Nursing call call  for report at 12:15  Nursing notified of above  IMM#2 completed with pt  Copy provided to pt  Original placed in scan bin

## 2019-10-28 NOTE — PLAN OF CARE
Problem: Prexisting or High Potential for Compromised Skin Integrity  Goal: Skin integrity is maintained or improved  Description  INTERVENTIONS:  - Identify patients at risk for skin breakdown  - Assess and monitor skin integrity  - Assess and monitor nutrition and hydration status  - Monitor labs   - Assess for incontinence   - Turn and reposition patient  - Assist with mobility/ambulation  - Relieve pressure over bony prominences  - Avoid friction and shearing  - Provide appropriate hygiene as needed including keeping skin clean and dry  - Evaluate need for skin moisturizer/barrier cream  - Collaborate with interdisciplinary team   - Patient/family teaching  - Consider wound care consult   Outcome: Progressing     Problem: Potential for Falls  Goal: Patient will remain free of falls  Description  INTERVENTIONS:  - Assess patient frequently for physical needs  -  Identify cognitive and physical deficits and behaviors that affect risk of falls    -  [place] fall precautions as indicated by assessment   - Educate patient/family on patient safety including physical limitations  - Instruct patient to call for assistance with activity based on assessment  - Modify environment to reduce risk of injury  - Consider OT/PT consult to assist with strengthening/mobility  Outcome: Progressing     Problem: PAIN - ADULT  Goal: Verbalizes/displays adequate comfort level or baseline comfort level  Description  Interventions:  - Encourage patient to monitor pain and request assistance  - Assess pain using appropriate pain scale  - Administer analgesics based on type and severity of pain and evaluate response  - Implement non-pharmacological measures as appropriate and evaluate response  - Consider cultural and social influences on pain and pain management  - Notify physician/advanced practitioner if interventions unsuccessful or patient reports new pain  Outcome: Progressing     Problem: INFECTION - ADULT  Goal: Absence or prevention of progression during hospitalization  Description  INTERVENTIONS:  - Assess and monitor for signs and symptoms of infection  - Monitor lab/diagnostic results  - Monitor all insertion sites, i e  indwelling lines, tubes, and drains  - Monitor endotracheal if appropriate and nasal secretions for changes in amount and color  - [place] appropriate cooling/warming therapies per order  - Administer medications as ordered  - Instruct and encourage patient and family to use good hand hygiene technique  - Identify and instruct in appropriate isolation precautions for identified infection/condition  Outcome: Progressing  Goal: Absence of fever/infection during neutropenic period  Description  INTERVENTIONS:  - Monitor WBC    Outcome: Progressing     Problem: SAFETY ADULT  Goal: Maintain or return to baseline ADL function  Description  INTERVENTIONS:  -  Assess patient's ability to carry out ADLs; assess patient's baseline for ADL function and identify physical deficits which impact ability to perform ADLs (bathing, care of mouth/teeth, toileting, grooming, dressing, etc )  - Assess/evaluate cause of self-care deficits   - Assess range of motion  - Assess patient's mobility; develop plan if impaired  - Assess patient's need for assistive devices and provide as appropriate  - Encourage maximum independence but intervene and supervise when necessary  - Involve family in performance of ADLs  - Assess for home care needs following discharge   - Consider OT consult to assist with ADL evaluation and planning for discharge  - Provide patient education as appropriate  Outcome: Progressing  Goal: Maintain or return mobility status to optimal level  Description  INTERVENTIONS:  - Assess patient's baseline mobility status (ambulation, transfers, stairs, etc )    - Identify cognitive and physical deficits and behaviors that affect mobility  - Identify mobility aids required to assist with transfers and/or ambulation (gait belt, sit-to-stand, lift, walker, cane, etc )  - [place] fall precautions as indicated by assessment  - Record patient progress and toleration of activity level on Mobility SBAR; progress patient to next Phase/Stage  - Instruct patient to call for assistance with activity based on assessment  - Consider rehabilitation consult to assist with strengthening/weightbearing, etc   Outcome: Progressing     Problem: DISCHARGE PLANNING - CARE MANAGEMENT  Goal: Discharge to post-acute care or home with appropriate resources  Description  INTERVENTIONS:  - Conduct assessment to determine patient/family and health care team treatment goals, and need for post-acute services based on payer coverage, community resources, and patient preferences, and barriers to discharge  - Address psychosocial, clinical, and financial barriers to discharge as identified in assessment in conjunction with the patient/family and health care team  - Arrange appropriate level of post-acute services according to patients   needs and preference and payer coverage in collaboration with the physician and health care team  - Communicate with and update the patient/family, physician, and health care team regarding progress on the discharge plan  - Arrange appropriate transportation to post-acute venues  Outcome: Progressing     Problem: Nutrition/Hydration-ADULT  Goal: Nutrient/Hydration intake appropriate for improving, restoring or maintaining nutritional needs  Description  Monitor and assess patient's nutrition/hydration status for malnutrition  Collaborate with interdisciplinary team and initiate plan and interventions as ordered  Monitor patient's weight and dietary intake as ordered or per policy  Utilize nutrition screening tool and intervene as necessary  Determine patient's food preferences and provide high-protein, high-caloric foods as appropriate       INTERVENTIONS:  - Monitor oral intake, urinary output, labs, and treatment plans  - Assess nutrition and hydration status and recommend course of action  - Evaluate amount of meals eaten  - Assist patient with eating if necessary   - Allow adequate time for meals  - Recommend/ encourage appropriate diets, oral nutritional supplements, and vitamin/mineral supplements  - Order, calculate, and assess calorie counts as needed  - Recommend, monitor, and adjust tube feedings and TPN/PPN based on assessed needs  - Assess need for intravenous fluids  - Provide specific nutrition/hydration education as appropriate  - Include patient/family/caregiver in decisions related to nutrition  Outcome: Progressing

## 2019-10-28 NOTE — RESPIRATORY THERAPY NOTE
RT Protocol Note  Diana Price 80 y o  female MRN: [id number]  Unit/Bed#: -73 Encounter: [id number]    Assessment    Active Problems:    * No active hospital problems  *      Home Pulmonary Medications:  Albuterol inhalers prn       Past Medical History:   Diagnosis Date    Arthritis     [facility] ([facility] ) 2015    Cardiac disease     aortic valve transplant    [facility]     Compression fracture of body of thoracic vertebra ([facility])     CVA (cerebral vascular accident) ([place] [address] )     Diabetes mellitus ([place] [address] )     Disease of thyroid gland     Fibromyalgia, primary     H/O cervical fracture 01/09/2019    Hyperlipidemia     Hypertension     Neuropathy     AZUL (obstructive sleep apnea) 10/19/2019    Pressure injury of skin     Renal disorder     kidney stent    Stroke [facility])      Social History     Socioeconomic History    Marital status:       Spouse name: Not on file    Number of children: 3    Years of education: Not on file    Highest education level: Not on file   Occupational History    Not on file   Social Needs    Financial resource strain: Not on file    Food insecurity:     Worry: Not on file     Inability: Not on file    Transportation needs:     Medical: Not on file     Non-medical: Not on file   Tobacco Use    Smoking status: Never Smoker    Smokeless tobacco: Never Used   Substance and Sexual Activity    Alcohol use: Never     Frequency: Never     Binge frequency: Never    Drug use: No    Sexual activity: Not Currently   Lifestyle    Physical activity:     Days per week: Not on file     Minutes per session: Not on file    Stress: Not on file   Relationships    Social connections:     Talks on phone: Not on file     Gets together: Not on file     Attends [employer] service: Not on file     Active member of club or organization: Not on file     Attends meetings of clubs or organizations: Not on file     Relationship status: Not on file   [facility] Intimate partner violence:     Fear of current or ex partner: Not on file     Emotionally abused: Not on file     Physically abused: Not on file     Forced sexual activity: Not on file   Other Topics Concern    Not on file   Social History Narrative    ** Merged History Encounter **            Subjective  Pt states breathing feels great today  Breathing back to base line       Objective  No respiratory distress noted  Physical Exam:   Assessment Type: Assess only  General Appearance: Alert, Awake  Respiratory Pattern: Normal  Chest Assessment: Chest expansion symmetrical  Bilateral Breath Sounds: Clear, Diminished  Cough: None    Vitals:  Blood pressure 124/56, pulse 69, temperature 99 3 °F (37 4 °C), temperature source Tympanic, resp  rate 20, height 4' 11" (1 499 m), weight 83 9 kg (184 lb 15 5 oz), SpO2 92 %, not currently breastfeeding  Results from last 7 days   Lab Units 10/24/19  0908 10/23/19  0905   PH ART  7 440 7 470*   PCO2 ART mm Hg 53 0* 46 0*   PO2 ART mm Hg 85 0 53 0*   HCO3 ART mmol/L 36 0* 33 5*   BASE EXC ART mmol/L 10 6* 8 9*   O2 CONTENT ART mL/dL 10 9* 10 3*   O2 HGB, ARTERIAL % 95 3 88 8*   ABG SOURCE  Radial, Right Radial, Left   CHUCK TEST  Yes Yes   NON VENT ROOM AIR %  --  21       Imaging and other studies: I have personally reviewed pertinent reports  Plan    Respiratory Plan: Home Bronchodilator Patient pathway      Pt discharged from medical floor  Will change therapy to home meds  Pt in for overnight oxygen study to qualify for home bipap

## 2019-10-28 NOTE — NURSING NOTE
Pt discharged to [facility] after report given to [facility] NADIRA, pt in no distress on discharge

## 2019-10-29 ENCOUNTER — APPOINTMENT (OUTPATIENT)
Dept: PHYSICAL THERAPY | Age: 84
End: 2019-10-29
Payer: COMMERCIAL

## 2019-10-29 PROBLEM — N18.30 CKD (CHRONIC KIDNEY DISEASE) STAGE 3, GFR 30-59 ML/MIN (HCC): Status: ACTIVE | Noted: 2019-10-29

## 2019-10-29 PROBLEM — E87.3 ALKALOSIS: Status: ACTIVE | Noted: 2019-10-29

## 2019-10-29 LAB
ANION GAP SERPL CALCULATED.3IONS-SCNC: 9 MMOL/L (ref 5–14)
BUN SERPL-MCNC: 72 MG/DL (ref 5–25)
CALCIUM SERPL-MCNC: 8.8 MG/DL (ref 8.4–10.2)
CHLORIDE SERPL-SCNC: 89 MMOL/L (ref 97–108)
CO2 SERPL-SCNC: 36 MMOL/L (ref 22–30)
CREAT SERPL-MCNC: 2.08 MG/DL (ref 0.6–1.2)
GFR SERPL CREATININE-BSD FRML MDRD: 21 ML/MIN/1.73SQ M
GLUCOSE P FAST SERPL-MCNC: 112 MG/DL (ref 70–99)
GLUCOSE SERPL-MCNC: 112 MG/DL (ref 70–99)
POTASSIUM SERPL-SCNC: 3.8 MMOL/L (ref 3.6–5)
SODIUM SERPL-SCNC: 134 MMOL/L (ref 137–147)

## 2019-10-29 PROCEDURE — 99232 SBSQ HOSP IP/OBS MODERATE 35: CPT | Performed by: INTERNAL MEDICINE

## 2019-10-29 PROCEDURE — 97535 SELF CARE MNGMENT TRAINING: CPT

## 2019-10-29 PROCEDURE — 99232 SBSQ HOSP IP/OBS MODERATE 35: CPT | Performed by: NURSE PRACTITIONER

## 2019-10-29 PROCEDURE — NC001 PR NO CHARGE: Performed by: INTERNAL MEDICINE

## 2019-10-29 PROCEDURE — 80048 BASIC METABOLIC PNL TOTAL CA: CPT | Performed by: PHYSICAL MEDICINE & REHABILITATION

## 2019-10-29 PROCEDURE — 97530 THERAPEUTIC ACTIVITIES: CPT

## 2019-10-29 PROCEDURE — 97167 OT EVAL HIGH COMPLEX 60 MIN: CPT

## 2019-10-29 PROCEDURE — 99232 SBSQ HOSP IP/OBS MODERATE 35: CPT | Performed by: PHYSICAL MEDICINE & REHABILITATION

## 2019-10-29 PROCEDURE — 97110 THERAPEUTIC EXERCISES: CPT

## 2019-10-29 PROCEDURE — 97162 PT EVAL MOD COMPLEX 30 MIN: CPT

## 2019-10-29 PROCEDURE — 97116 GAIT TRAINING THERAPY: CPT

## 2019-10-29 RX ORDER — PANTOPRAZOLE SODIUM 20 MG/1
20 TABLET, DELAYED RELEASE ORAL
Status: DISCONTINUED | OUTPATIENT
Start: 2019-10-30 | End: 2019-11-06 | Stop reason: HOSPADM

## 2019-10-29 RX ORDER — CLOPIDOGREL BISULFATE 75 MG/1
75 TABLET ORAL DAILY
Status: DISCONTINUED | OUTPATIENT
Start: 2019-10-30 | End: 2019-11-06 | Stop reason: HOSPADM

## 2019-10-29 RX ADMIN — METHOCARBAMOL 500 MG: 500 TABLET ORAL at 22:45

## 2019-10-29 RX ADMIN — HEPARIN SODIUM 5000 UNITS: 5000 INJECTION INTRAVENOUS; SUBCUTANEOUS at 21:38

## 2019-10-29 RX ADMIN — GABAPENTIN 300 MG: 300 CAPSULE ORAL at 17:52

## 2019-10-29 RX ADMIN — ACETAMINOPHEN 650 MG: 325 TABLET ORAL at 22:45

## 2019-10-29 RX ADMIN — LIDOCAINE 2 PATCH: 50 PATCH CUTANEOUS at 10:39

## 2019-10-29 RX ADMIN — HEPARIN SODIUM 5000 UNITS: 5000 INJECTION INTRAVENOUS; SUBCUTANEOUS at 14:56

## 2019-10-29 RX ADMIN — GABAPENTIN 300 MG: 300 CAPSULE ORAL at 21:38

## 2019-10-29 RX ADMIN — GABAPENTIN 300 MG: 300 CAPSULE ORAL at 09:26

## 2019-10-29 RX ADMIN — SERTRALINE 25 MG: 25 TABLET, FILM COATED ORAL at 21:38

## 2019-10-29 RX ADMIN — METOPROLOL TARTRATE 12.5 MG: 25 TABLET ORAL at 09:26

## 2019-10-29 RX ADMIN — PANTOPRAZOLE SODIUM 40 MG: 40 TABLET, DELAYED RELEASE ORAL at 06:04

## 2019-10-29 RX ADMIN — NYSTATIN: 100000 POWDER TOPICAL at 17:52

## 2019-10-29 RX ADMIN — LEVOTHYROXINE SODIUM 112 MCG: 112 TABLET ORAL at 06:04

## 2019-10-29 RX ADMIN — HEPARIN SODIUM 5000 UNITS: 5000 INJECTION INTRAVENOUS; SUBCUTANEOUS at 06:04

## 2019-10-29 RX ADMIN — METOPROLOL TARTRATE 12.5 MG: 25 TABLET ORAL at 21:37

## 2019-10-29 RX ADMIN — ACETAMINOPHEN 650 MG: 325 TABLET ORAL at 08:43

## 2019-10-29 RX ADMIN — NYSTATIN: 100000 POWDER TOPICAL at 09:27

## 2019-10-29 RX ADMIN — TORSEMIDE 20 MG: 20 TABLET ORAL at 09:26

## 2019-10-29 RX ADMIN — METHOCARBAMOL 500 MG: 500 TABLET ORAL at 08:43

## 2019-10-29 NOTE — PLAN OF CARE
Problem: CARDIOVASCULAR - ADULT  Goal: Maintains optimal cardiac output and hemodynamic stability  Description  INTERVENTIONS:  - Monitor I/O, vital signs and rhythm  - Monitor for S/S and trends of decreased cardiac output  - Administer and titrate ordered vasoactive medications to optimize hemodynamic stability  - Assess quality of pulses, skin color and temperature  - Assess for signs of decreased coronary artery perfusion  - Instruct patient to report change in severity of symptoms  Outcome: Progressing  Goal: Absence of cardiac dysrhythmias or at baseline rhythm  Description  INTERVENTIONS:  - Continuous cardiac monitoring, vital signs, obtain 12 lead EKG if ordered  - Administer antiarrhythmic and heart rate control medications as ordered  - Monitor electrolytes and administer replacement therapy as ordered  Outcome: Progressing     Problem: RESPIRATORY - ADULT  Goal: Achieves optimal ventilation and oxygenation  Description  INTERVENTIONS:  - Assess for changes in respiratory status  - Assess for changes in mentation and behavior  - Position to facilitate oxygenation and minimize respiratory effort  - Oxygen administered by appropriate delivery if ordered  - Initiate smoking cessation education as indicated  - Encourage broncho-pulmonary hygiene including cough, deep breathe, Incentive Spirometry  - Assess the need for suctioning and aspirate as needed  - Assess and instruct to report SOB or any respiratory difficulty  - Respiratory Therapy support as indicated  Outcome: Progressing     Problem: GASTROINTESTINAL - ADULT  Goal: Maintains adequate nutritional intake  Description  INTERVENTIONS:  - Monitor percentage of each meal consumed  - Identify factors contributing to decreased intake, treat as appropriate  - Assist with meals as needed  - Monitor I&O, weight, and lab values if indicated  - Obtain nutrition services referral as needed  Outcome: Progressing     Problem: METABOLIC, FLUID AND ELECTROLYTES - ADULT  Goal: Electrolytes maintained within normal limits  Description  INTERVENTIONS:  - Monitor labs and assess patient for signs and symptoms of electrolyte imbalances  - Administer electrolyte replacement as ordered  - Monitor response to electrolyte replacements, including repeat lab results as appropriate  - Instruct patient on fluid and nutrition as appropriate  Outcome: Progressing  Goal: Fluid balance maintained  Description  INTERVENTIONS:  - Monitor labs   - Monitor I/O and WT  - Instruct patient on fluid and nutrition as appropriate  - Assess for signs & symptoms of volume excess or deficit  Outcome: Progressing  Goal: Glucose maintained within target range  Description  INTERVENTIONS:  - Monitor Blood Glucose as ordered  - Assess for signs and symptoms of hyperglycemia and hypoglycemia  - Administer ordered medications to maintain glucose within target range  - Assess nutritional intake and initiate nutrition service referral as needed  Outcome: Progressing     Problem: SKIN/TISSUE INTEGRITY - ADULT  Goal: Skin integrity remains intact  Description  INTERVENTIONS  - Identify patients at risk for skin breakdown  - Assess and monitor skin integrity  - Assess and monitor nutrition and hydration status  - Monitor labs (i e  albumin)  - Assess for incontinence   - Turn and reposition patient  - Assist with mobility/ambulation  - Relieve pressure over bony prominences  - Avoid friction and shearing  - Provide appropriate hygiene as needed including keeping skin clean and dry  - Evaluate need for skin moisturizer/barrier cream  - Collaborate with interdisciplinary team (i e  Nutrition, Rehabilitation, etc )   - Patient/family teaching  Outcome: Progressing  Goal: Oral mucous membranes remain intact  Description  INTERVENTIONS  - Assess oral mucosa and hygiene practices  - Implement preventative oral hygiene regimen  - Implement oral medicated treatments as ordered  - Initiate Nutrition services referral as needed  Outcome: Progressing     Problem: MUSCULOSKELETAL - ADULT  Goal: Maintain or return mobility to safest level of function  Description  INTERVENTIONS:  - Assess patient's ability to carry out ADLs; assess patient's baseline for ADL function and identify physical deficits which impact ability to perform ADLs (bathing, care of mouth/teeth, toileting, grooming, dressing, etc )  - Assess/evaluate cause of self-care deficits   - Assess range of motion  - Assess patient's mobility  - Assess patient's need for assistive devices and provide as appropriate  - Encourage maximum independence but intervene and supervise when necessary  - Involve family in performance of ADLs  - Assess for home care needs following discharge   - Consider OT consult to assist with ADL evaluation and planning for discharge  - Provide patient education as appropriate  Outcome: Progressing  Goal: Maintain proper alignment of affected body part  Description  INTERVENTIONS:  - Support, maintain and protect limb and body alignment  - Provide patient/ family with appropriate education  Outcome: Progressing     Problem: COPING  Goal: Pt/Family able to verbalize concerns and demonstrate effective coping strategies  Description  INTERVENTIONS:  - Assist patient/family to identify coping skills, available support systems and cultural and spiritual values  - Provide emotional support, including active listening and acknowledgement of concerns of patient and caregivers  - Reduce environmental stimuli, as able  - Provide patient education  - Assess for spiritual pain/suffering and initiate spiritual care, including notification of Pastoral Care or bianka based community as needed  - Assess effectiveness of coping strategies  Outcome: Progressing  Goal: Will report anxiety at manageable levels  Description  INTERVENTIONS:  - Administer medication as ordered  - Teach and encourage coping skills  - Provide emotional support  - Assess patient/family for anxiety and ability to cope  Outcome: Progressing     Problem: Prexisting or High Potential for Compromised Skin Integrity  Goal: Skin integrity is maintained or improved  Description  INTERVENTIONS:  - Identify patients at risk for skin breakdown  - Assess and monitor skin integrity  - Assess and monitor nutrition and hydration status  - Monitor labs   - Assess for incontinence   - Turn and reposition patient  - Assist with mobility/ambulation  - Relieve pressure over bony prominences  - Avoid friction and shearing  - Provide appropriate hygiene as needed including keeping skin clean and dry  - Evaluate need for skin moisturizer/barrier cream  - Collaborate with interdisciplinary team   - Patient/family teaching  - Consider wound care consult   Outcome: Progressing     Problem: Potential for Falls  Goal: Patient will remain free of falls  Description  INTERVENTIONS:  - Assess patient frequently for physical needs  -  Identify cognitive and physical deficits and behaviors that affect risk of falls    -  [place] fall precautions as indicated by assessment   - Educate patient/family on patient safety including physical limitations  - Instruct patient to call for assistance with activity based on assessment  - Modify environment to reduce risk of injury  - Consider OT/PT consult to assist with strengthening/mobility  Outcome: Progressing     Problem: PAIN - ADULT  Goal: Verbalizes/displays adequate comfort level or baseline comfort level  Description  Interventions:  - Encourage patient to monitor pain and request assistance  - Assess pain using appropriate pain scale  - Administer analgesics based on type and severity of pain and evaluate response  - Implement non-pharmacological measures as appropriate and evaluate response  - Consider cultural and social influences on pain and pain management  - Notify physician/advanced practitioner if interventions unsuccessful or patient reports new pain  Outcome: Progressing

## 2019-10-29 NOTE — ASSESSMENT & PLAN NOTE
Secondary to a fall 10/8  Patient was admitted to [facility] 10/8 through 10/10  She was evaluated by Neurosurgery who recommended conservative management of SDH

## 2019-10-29 NOTE — TREATMENT PLAN
Individualized Plan of [address] [facility] 80 y o  female MRN: [id number]  Unit/Bed#: -02 Encounter: [id number]     PATIENT INFORMATION  ADMISSION DATE: 10/28/2019  1:22 PM APOORVA CATEGORY:Brain Dysfunction:  02 22  Traumatic, Closed Injury   ADMISSION DIAGNOSIS: CHF (congestive heart failure) ([facility]) [I50 9]  Weakness [R53 1]  EXPECTED LOS: 10 to 14 days     MEDICAL/FUNCTIONAL PROGNOSIS  Based on my assessment of the patient's medical conditions and current functional status, the prognosis for attaining medical and functional goals or the IRF stay is:  Good    Medical Goals: Patient will be medically stable for discharge to [facility] upon completion of rehab program    [address]: Home - with supervision    ANTICIPATED FOLLOW-UP SERVICE:   Outpatient Therapy Services: PT and OT      Home Health Services: PT, OT and Nursing    DISCIPLINE SPECIFIC PLANS:  Required Disciplines & Services: Rehabillitation Nursing, Case Management, Repiratory Therapy and Dietay/Nutrition    REQUIRED THERAPY:  Therapy Hours per Day Days per Week Total Days   Physical Therapy 1 5 5   Occupational Therapy 1 5 5   Speech/Language Therapy 1 5 5   NOTE: Additional therapy time(s) may be added as appropriate to meet patient needs and to achieve functional goals      Patient will either participate in above therapy regimen or participate in 900 minutes of therapy within 7 day week consisting of PT, OT and SLP due to the following medical procedure/condition:Brain Dysfunction:  02 22  Traumatic, Closed Injury    ANTICIPATED FUNCTIONAL OUTCOMES:  ADL: Patient will require assist with ADLs with least restrictive device upon completion of rehab program   Bladder/Bowel:  modified independent   Transfers: Patient will be independent with transfers with least restrictive device upon completion of rehab program   Locomotion: Patient will be independent with locomotion with least restrictive device upon completion of rehab program   Cognitive:  Supervision     DISCHARGE PLANNING NEEDS  Equipment needs: Discharge needs to be reviewed with team    REHAB ANTICIPATED PARTICIPATION RESTRICTIONS:  Assist with Mobility, Inability to Drive and Rquires Assist with ADLS

## 2019-10-29 NOTE — NURSING NOTE
Small amount of bloody drainage noted under right pannus  Area remains excoriated  Pt  Stated that area does bleed for her periodically  Area cleansed with normal saline and ABD applied in abdominal fold  Nystatin powder utilized under right breast and abdominal folds  Pt  Denies any discomfort in area  Will continue to monitor

## 2019-10-29 NOTE — ASSESSMENT & PLAN NOTE
No CPAP at home, new since recent admissions  Discussed with Dr Mariann Mckeon, will get pulmonology involved and coordinate with nocturnal pulse ox to help with approval for home CPAP  Reviewed results of study for last p m  [place] Patient was on 2 L nasal cannula  Patient's pulse oximetry was 90-99% 86 6% of the time and 100% 13 4% of the time

## 2019-10-29 NOTE — ASSESSMENT & PLAN NOTE
Lab Results   Component Value Date    HGBA1C 5 0 08/16/2019       Recent Labs     10/27/19  0606   POCGLU 121       Blood Sugar Average: Last 72 hrs:      blood sugars were well controlled during her hospitalization  Her last A1c was 5 0  She did not require any insulin during hospitalization    Recommend diabetic diet

## 2019-10-29 NOTE — PROGRESS NOTES
OT EVALUATION       10/29/19 0700   Patient Data   Rehab Impairment  Impairment of mobility, safety and Activities of Daily Living (ADLs) due to Brain Dysfunction:  02 22  Traumatic, Closed Injury    Etiologic Diagnosis Right parafalcine Hemorrhage    Date of Onset 10/18/19   Support System   Name Han Day   Relationship daughter   Able to provide 24 hour supervision No  (works [email or web address] on those days)   Able to provide physical help? Yes   Home Setup   Type of Home Single Level   Method of Entry Ramp   Number of Stairs 0   Number of Stairs in Home 0   In Home Hand Rail Left  (descending to bathroom)   First Floor Bathroom Full; Shower;Curtain;Grab Bars;Built-in shower seat   First Floor Bathroom Accessibility Grab bars by toilet;Grab bars in tub/shower;Built in shower seat; Shower chair;Raised toilet seat   Second Floor Bathroom None   First Floor Setup Available Yes   Home Modifications Necessary? No   Available Equipment Roller Walker;Rollator; Bedside Commode  (transfer chair, grab bars, raised toilet)   [address] [place] [address] prior to Admission [address]  (every monday to compelte laundry, cleaning, etc)   Prior Device(s) Used Roller Walker   Prior IADL Participation   Money Management   (daughter completes)   Meal Preparation   (daughter completes)   Laundry   (daughter completes)   Home Cleaning   (daughter completes)   Prior Level of Function   Self-Care 2  Needed Some Help - Patient needed a partial assistance from another person to complete activities  Indoor-Mobility (Ambulation) 2  Needed Some Help - Patient needed a partial assistance from another person to complete activities  Stairs 2  Needed Some Help - Patient needed a partial assistance from another person to complete activities  Functional Cognition 3  Independent - Patient completed the activities by him/herself, with or without an assistive device, with no assistance from a helper     Prior Assistance Needed for Driving;Household Chores/Cleaning;Meal Preparation;Medication Management;Money Management; Shopping  (daughter completes)   Prior Device Used D  Charity Pipe in the Last Year   Number of falls in the past 12 months 3   Type of Injury Associated with Fall Major injury   Patient Preference   Natasha (Patient Preference) Alvena Osler   Psychosocial   Psychosocial (WDL) WDL   Patient Behaviors/Mood Cooperative   Length of Time/Family Visitation 2-4 hrs   Restrictions/Precautions   Precautions Fall Risk;Multiple lines  (PICC line LUE, monitor O2 stats)   Weight Bearing Restrictions No   ROM Restrictions No   Pain Assessment   Pain Assessment No/denies pain   Pain Score No Pain   Eating Assessment   Type of Assistance Needed Independent   Amount of Physical Assistance Provided No physical assistance   Eating CARE Score 6   Oral Hygiene   Type of Assistance Needed Supervision   Amount of Physical Assistance Provided No physical assistance   Comment Pt seated in front of sink, pt able to open tooth paste and palce on toothbrush  pt able to manage hygiene of dentures   Oral Hygiene CARE Score 4   Grooming   Able To Initiate Tasks;Comb/Brush Hair;Wash/Dry Face;Brush/Clean Teeth;Wash/Dry Hands   Limitation Noted In Timeliness   Tub/Shower Transfer   Limitations Noted In Balance; Coordination; Endurance;Problem Solving; Safety;UE Strength;LE Strength   Adaptive Equipment Grab Bars;Transfer Bench   Assessed Shower   Findings Pt angled with tub bench, pt able to lean forward and grasp grab bar with modA to perform side stepping into shower with vc t/o for proper hand/foot placement   Shower/Bathe Self   Type of Assistance Needed Physical assistance   Amount of Physical Assistance Provided 75% or more   Comment Therapist covered PICC line with tegaderm and asked RN Fabienne Peterson to check PICC after shower, RN reporting she will flush PICC and keep and eye out on dressing  Pt able to was UB and upper thighs   Pt requiring total A to wash below knees including feeting  Pt requiring modA to maintian balance while in stance to was loreto area  Pt demonstrating decreased activity tolerance and dynamic standing balance during bathing  Shower/Bathe Self CARE Score 2   Bathing   Assessed Bath Style Shower   Anticipated D/C Bath Style Shower   Able to [place] No   Able to [employer] Temperature No   Able to Wash/Rinse/Dry (body part) Left Arm;Right Arm;L Upper Leg;R Upper Leg;Chest;Abdomen;Perineal Area   Limitations Noted in Balance; Endurance;Problem Solving; Safety;Strength;Timeliness   Positioning Seated;Standing   Adaptive Equipment Tub Bench; Shower [employer]   Findings  see above   Dressing/Undressing Clothing   Remove UB Clothes Other  (hospital gown)   [address]; Other  (long sleeve)   Type of Assistance Needed Physical assistance   Amount of Physical Assistance Provided 50%-74%   Comment Pt able to don BUE thorugh arms and pull over head, however, requires A to adjust clothing from front and back while completing in seated position  Nursing aware of wound on upper R belly fold open wound (per pt 2* to yeast incetion)  Therapist fully dried pt on belly fold and applied Niastatin as per nursing with ABD pad  Upper Body Dressing CARE Score 2   Remove LB Clothes Socks   Don LB Clothes Pants;Socks; Shoes; Other  (pull ups)   Type of Assistance Needed Physical assistance   Amount of Physical Assistance Provided Total assistance   Comment 2* to decreased forward flex and decreased activity tolerance, pt requires total A to don pants and pull ups  Pt requiring modA to maintian balance while in stance to pull pants up, pt able to initiate and attempt to pull pants up, however, pt requiring A from therapist to fully bring pants over hips   Lower Body Dressing CARE Score 1   Limitations Noted In Balance; Coordination; Endurance; Safety;Strength;Timeliness   Positioning Supported Sit;Standing   Putting On/Taking Off Footwear   Type of Assistance Needed Physical assistance   Amount of Physical Assistance Provided Total assistance   Comment TA to don compression socks and sneakers   Putting On/Taking Off Footwear CARE Score 1   Toileting Hygiene   Type of Assistance Needed Physical assistance   Amount of Physical Assistance Provided Total assistance   Comment pt able to A with standing, TA for hygiene   Toileting Hygiene CARE Score 1   Toilet Transfer   Surface Assessed Drop Arm Commode  (too high, use raised toilet seat only)   Transfer Technique Stand Pivot   Limitations Noted In Balance; Endurance;Problem Solving; Sequencing;UE Strength;LE Strength   Adaptive Equipment Grab Bar;Walker   Positioning Concerns Grab Bars   Findings Pt able to transfer with use of grab bars to pull up and pivot to toilet with Deondre  In addition pt able to perform SPT with RW with modA to toilet  Pt reporting drop arm commode too high, pt can benefit from regular raised toilet seat   Type of Assistance Needed Physical assistance   Amount of Physical Assistance Provided 25%-49%   Toilet Transfer CARE Score 3   Toileting   Able to Pull Clothing down no, up no  Able to Manage Clothing Closures No   Limitations Noted In Balance; Coordination;UE Strength;LE Strength   Adaptive Equipment Grab Bar   Transfer Bed/Chair/Wheelchair   Positioning Concerns Skin Integrity   Limitations Noted In Balance; Coordination; Endurance;Problem Solving; Sequencing;UE Strength;LE Strength   Adaptive Equipment Roller Walker   Findings Pt requires Deondre with mod vc to perform SPT with use of RW from bed>w/c  Pt requiring extra time to complte 2* to slow movements   Type of Assistance Needed Physical assistance   Amount of Physical Assistance Provided 25%-49%   Chair/Bed-to-Chair Transfer CARE Score 3   Lying to Sitting on Side of Bed   Comment Pt requires maxA to perform supine ot sit with A for trunk and BLE mngmnt   However, pt sleeps in recliner chair at home   Reason if not Attempted Activity not applicable   Lying to Sitting on Side of Bed CARE Score 9   Sit to Stand   Type of Assistance Needed Physical assistance   Amount of Physical Assistance Provided 25%-49%   Comment Pt requiring Deondre to perform functional sit<>stand with vc t o for safe transfer techniques including pushing form chair and reaching back when sitting   Sit to Stand CARE Score 3   Picking Up Object   Reason if not Attempted Safety concerns   Picking Up Object CARE Score 88   Sensation   Light Touch No apparent deficits   Cognition   Overall Cognitive Status Impaired   Arousal/Participation Cooperative; Alert   Attention Within functional limits   Orientation Level Oriented X4   Memory Decreased short term memory;Decreased recall of recent events;Decreased long term memory   Following Commands Follows multistep commands with increased time or repetition   Discharge Information   Impressions Patient is an 80year old female that presented to [place] [address] for evaluation s/p fall at home  Per patient's daughter, patient sustained the fall due to leg weakness when trying to go up the stairs  Patient denies hitting her head or loss of consciousness  Patient was recently hospitalized at [address] [place] [address] (10/8 - 10/10) following a fall while there for an outpatient visit  Pt discharged to acute rehab on 10/19/19  Pt in acute rehab for about 2 days until pt presenting with respiratory distress and elevated BNP  EF of 41%  Patient was stable to transfer out of the ICU on 10/23  Pt currently lives with daughter in lower level with a ramp to enter  Prior to hospital admission daughter A with ADLs and completed all IADL tasks  Daughter works [employer] and pt reports she receives A from [employer] members on days daughter works  Pt currently performed functional ADL mobility with RW for shirt distances and items retrieval and transport with use of walker tray   Pt currently requires modA with UB dressing to pull clothing from front and back  Pt is TA for LB dressing at this times  Pt reports daughters or grand children A with LB dressing  Pt able to perform functional transfers with Deondre and use of RW  Pt requires extra time to complete transfer, however, requires minimal assistance  Pt seen for 90mins of skilled OT services with emphasis on self care and functional transfers  Pt presents with the following performance component deficits impacting ADL/IADL skills: decreased activity tolerance, decreased BUE strength, decreased endurance, decreased static/dynamic standing balance, decreased endurance  Pt can benefit from 1 week of skilled OT services to address these areas and resume prior occupational roles to maximize independence to reduce risk of fall and decrease risk of readmission and decrease caregiver burden  Pt observed to move slowly however, able to participate in functional tasks, O2 stats maintained above 95%  Pt close to baseline with good family support and assistance from [employer] members      OT Therapy Minutes   OT Time In 0700   OT Time Out 0830   OT Total Time (minutes) 90   OT Mode of treatment - Individual (minutes) 90   OT Mode of treatment - Concurrent (minutes) 0   OT Mode of treatment - Group (minutes) 0   OT Mode of treatment - Co-treat (minutes) 0   OT Mode of Treatment - Total time(minutes) 90 minutes   OT Cumulative Minutes 90   Cumulative Minutes   Cumulative therapy minutes 90

## 2019-10-29 NOTE — PROGRESS NOTES
PT Initial Eval       10/29/19 1000   Patient Data   Rehab Impairment Decline in functional mobility   Etiologic Diagnosis SDH   Date of Onset 10/18/19   Home Setup   Type of Home Single Level   Method of Entry Ramp   Number of Stairs 0   Number of Stairs in Home 0   Home Modifications Necessary? No   Available Equipment [employer]; Bedside Commode; Wheelchair; Shower Chair  (transport chir, reacher)   Baseline Information   Prior Device(s) Used Roller Walker   Prior Level of Function   Indoor-Mobility (Ambulation) 3  Independent - Patient completed the activities by him/herself, with or without an assistive device, with no assistance from a helper  Stairs 9  Not Applicable   Prior Device Used VELMA Galaviz in the Last Year   Number of falls in the past 12 months 3   Type of Injury Associated with Fall Major injury   Psychosocial   Psychosocial (WDL) WDL   Restrictions/Precautions   Precautions Fall Risk   Pain Assessment   Pain Assessment 0-10   Pain Score 4   Pain Type Chronic pain   Pain Location Shoulder   Pain Orientation Bilateral   Pain Frequency Constant/continuous   Pain Onset   (when active)   Hospital Pain Intervention(s) Rest   Transfer Bed/Chair/Wheelchair   Limitations Noted In Endurance;Balance;Confidence   Adaptive Equipment Roller Walker   Findings moves slowly, requires use of B UE support   Type of Assistance Needed Incidental touching; Adaptive equipment   Amount of Physical Assistance Provided Less than 25%   Chair/Bed-to-Chair Transfer CARE Score 3   Roll Left and Right   Type of Assistance Needed Physical assistance; Adaptive equipment   Amount of Physical Assistance Provided 75% or more   Comment pt does not sleep in bed at home   Roll Left and Right CARE Score 2   Sit to Lying   Type of Assistance Needed Physical assistance; Adaptive equipment   Amount of Physical Assistance Provided 50%-74%   Comment Pt does not sleep in bed at home   Sit to Lying CARE Score 2   Lying to Sitting on Side of Bed   Type of Assistance Needed Physical assistance; Adaptive equipment   Amount of Physical Assistance Provided 50%-74%   Comment Pt does not sleep in bed at home   Lying to Sitting on Side of Bed CARE Score 2   Sit to Stand   Type of Assistance Needed Incidental touching   Amount of Physical Assistance Provided No physical assistance   Comment slow moving   Sit to Stand CARE Score 4   Picking Up Object   Type of Assistance Needed Incidental touching; Adaptive equipment   Amount of Physical Assistance Provided Less than 25%   Comment using reacher   Picking Up Object CARE Score 3   Car Transfer   Type of Assistance Needed Physical assistance; Adaptive equipment   Amount of Physical Assistance Provided 25%-49%   Comment A B LE   Car Transfer CARE Score 3   Ambulation   Primary Mode of Locomotion Prior to Admission Walk   Distance Walked (feet) 50 ft   Assist Device Roller Walker   Gait Pattern Decreased foot clearance   Walk 10 Feet   Type of Assistance Needed Independent; Adaptive equipment   Walk 10 Feet CARE Score 6   Walk 50 Feet with Two Turns   Type of Assistance Needed Independent; Adaptive equipment   Walk 50 Feet with Two Turns CARE Score 6   Walk 150 Feet   Reason if not Attempted Activity not applicable   Walk 660 Feet CARE Score 9   Walking 10 Feet on Uneven Surfaces   Type of Assistance Needed Supervision; Adaptive equipment   Amount of Physical Assistance Provided Less than 25%   Comment steadying of RW   Walking 10 Feet on Uneven Surfaces CARE Score 3   Wheelchair mobility   Type of Wheelchair Used   (family would push pt using transport chair)   Curb or Single Stair   Reason if not Attempted Activity not applicable   1 Step (Curb) CARE Score 9   4 Steps   Reason if not Attempted Activity not applicable   4 Steps CARE Score 9   12 Steps   Reason if not Attempted Activity not applicable   12 Steps CARE Score 9   Comprehension   QI: Comprehension 4   Undestands: Clear comprehension without cues or repetitions   Expression   QI: Expression 4  Express complex messages without difficulty and with speech that is clear and easy to [place]   RLE Assessment   RLE Assessment WFL   Strength RLE   RLE Overall Strength 5/5   LLE Assessment   LLE Assessment WFL   Strength LLE   LLE Overall Strength 5/5   RUE Assessment   RUE Assessment   (Hx of chroic pain in shoulder)   LUE Assessment   LUE Assessment   (Hx of chronic pain in shoulder)   Sensation   Light Touch No apparent deficits   Cognition   Overall Cognitive Status WFL   Therapeutic Exercise   Therapeutic Exercise/Activity nustep x 12 min lvl 1, SPM 55-65 RPE of 10-11  Practiced STS for functional strengthening  W/c changed for one with full length armrests to allow pt useof arms to push up  Discharge Information   Patient's Discharge Plan To return to her home with family and friends to help as needed   Patient's Rehab Expectations To get stronger and be able to walk more on her own   Barriers to Discharge Home Decreased Endurance; Safety Considerations   Impressions Pt is a 79 y/o female with extensive medical Hx including CVA, Multiple vertebral Fxs, multiple falls with SDH, Fibromyalgia, Chronic pain, Obesity and most recently CHF withrespiratory failure  Pt has been sleeping in power recliner chair for past 9 yrs, using RW for mobility for past 9 months  requires extra time with all transfers and ambulation  Relys on B UE support when ambulating for balance, however pt reporting B shoulder pain limits her tolerance  Household distance ambulator prior with family using transport w/c for community  Pt will benfit from skilled therapy to maximze her functional safety and mobility within her home  Good Rehab candidate     PT Therapy Minutes   PT Time In 1000   PT Time Out 1130   PT Total Time (minutes) 90   PT Mode of treatment - Individual (minutes) 90   PT Mode of treatment - Concurrent (minutes) 0   PT Mode of treatment - Group (minutes) 0   PT Mode of treatment - Co-treat (minutes) 0   PT Mode of Treatment - Total time(minutes) 90 minutes   PT Cumulative Minutes 90   Cumulative Minutes   Cumulative therapy minutes 180

## 2019-10-29 NOTE — CONSULTS
Please see original consult note completed yesterday by Dr Mendel Saldivar  Will see in follow up later today

## 2019-10-29 NOTE — ASSESSMENT & PLAN NOTE
Reviewed Nephrology consult  Patient with a baseline creatinine of 1 5-1 6  Her torsemide was reduced secondary to increasing renal function  She does need to have been repeat renal artery ultrasound secondary to history of renal artery stenosis  It was attempted to be completed during his recent hospitalization but was unable to be secondary to discomfort  Slight increase in creat yesterday today 1 95--> 2 08    Await renal input for today with further adjustment of torsemide

## 2019-10-29 NOTE — PROGRESS NOTES
Physical Medicine and Rehabilitation Progress Note  Williams Castellon 80 y o  female MRN: [id number]  Unit/Bed#: [facility] 238-68 Encounter: [id number]    HPI: Williams Castellon is a 80 y o  female who presented to the [address] [place] [address] with generalized weakness  Patient formally been admitted from October 8th and discharged home on October 10th after fall at home  At the time workup was significant for subdural hematoma  Conservative management was recommended that time  Patient then re-presented on 10/12/19 with weakness and gait instability  During the 2nd admission, patient needed up with acute respiratory failure, requiring admission to ICU  Pulmonary function eventually did improve to the point where patient was able to be discharged to the [facility] on 10/19/19  Unfortunately patient's respiratory function decline, patient found to be in significant respiratory distress on October 21st, necessitated discharge back to acute hospital   Patient was found to have [place] 73[address]  In addition imaging was positive for evidence of overload  Patient was diuresed with torsemide  Eventually pulmonary function improved to the point where patient could return to the [facility]  She was admitted to [facility] on 10/28/19  Chief Complaint: f/u TBI and f/u ambulatory dysfunction    Interval: No acute events overnight  Denies any CP or SOB  Patient without complaint  Slept well overnight  ROS: A 10 point ROS was performed; negative except as noted above  Assessment/Plan:      * SDH (subdural hematoma) (HCC)  Assessment & Plan  · Conservative management was recommended  · Patient with a right subdural hematoma  · Discussed with neurosurgery, cleared for DVT prophylaxis, and plavix  Per service, bleed looks to have resolved  No need for repeat CTOH unless neurologic decline noted       Renal artery stenosis [facility])  Assessment & Plan  · Nephrology service is following  · Patient may require repeat ultrasound with pain medications    AZUL (obstructive sleep apnea)  Assessment & Plan  · Continue BiPAP at night  · Will consult pulmonology closer to discharge to evaluate for DME needs for BiPAP    Chronic anemia  Assessment & Plan  Results from last 7 days   Lab Units 10/23/19  0451   HEMOGLOBIN g/dL 8 1*     · Monitor CBC intermittently   · Transfuse for Hgb <7    Depression  Assessment & Plan  · Continue sertraline    Acute on chronic diastolic heart failure (HCC)  Assessment & Plan  Wt Readings from Last 3 Encounters:   10/29/19 83 6 kg (184 lb 4 9 oz)   10/28/19 76 kg (167 lb 8 8 oz)   10/27/19 81 9 kg (180 lb 8 9 oz)       · Monitor daily weights  · Patient now on torsemide 20 mg daily      Essential hypertension  Assessment & Plan  Temp:  [98 1 °F (36 7 °C)-99 3 °F (37 4 °C)] 98 1 °F (36 7 °C)  HR:  [68-72] 72  Resp:  [18-20] 20  BP: (113-124)/(54-60) 117/56    · Continue Metoprolol   · IM service managing anti-hypertensives, adjusting as needed    H/O: CVA (cerebrovascular accident)  Assessment & Plan  · Plavix was on hold due to bleed  · Discussed with neurosurgery  Patient now cleared to resume  Hypothyroidism  Assessment & Plan  · Continue Synthroid    # Skin  · Encourage regular turning as patient at risk for skin breakdown  · Staff to continue patient education on Q2h turning  · Rehabilitation team to perform skin checks regularly     # Bowel  · Patient reports no constipation  · To ensure regular BMs, bowel regimen consisting of:  colace , dulcolax suppository  and miralax     # Bladder  · Patient voiding spontaneously    # Pain  · Continue tylenol, for max of 3gm daily        # Rehab Psych   · There are no psychological or psychiatric problems identified    # Other  - Diet/Nutrition:        Diet Orders   (From admission, onward)             Start     Ordered    10/28/19 1323  Diet Cardiovascular; Cardiac; Fluid Restriction 1200 ML, Consistent Carbohydrate Diet Level 2 (5 carb servings/75 grams CHO/meal)  Diet effective now     Question Answer Comment   Diet Type Cardiovascular    Cardiac Cardiac    Other Restriction(s): Fluid Restriction 1200 ML    Other Restriction(s): Consistent Carbohydrate Diet Level 2 (5 carb servings/75 grams CHO/meal)    RD to adjust diet per protocol? Yes        10/28/19 1323              - DVT prophy: Sequential compression device (Venodyne)  and Heparin  - GI ppx: Pantaprazole  - Nausea: None  - Supplements: None  - Sleep: None    Disposition: TBD    CODE: Level 1: Full Code Scheduled Meds:    Current Facility-Administered Medications:  acetaminophen 650 mg Oral Q6H PRN Anton Shrestha MD   albuterol 2 puff Inhalation Q4H PRN Anton Shrestha MD   gabapentin 300 mg Oral TID Anton Shrestha MD   heparin (porcine) 5,000 Units Subcutaneous Q8H [facility] Anton Shrestha MD   levothyroxine 112 mcg Oral Daily Anton Shrestha MD   lidocaine 3 patch Topical Daily PRN Anton Shrestha MD   methocarbamol 500 mg Oral Q6H PRN Anton Shrestha MD   metoprolol tartrate 12 5 mg Oral Q12H [facility] Anton Shrestha MD   nystatin  Topical BID Anton Shrestha MD   pantoprazole 40 mg Oral Daily Before Breakfast Anton Shrestha MD   polyethylene glycol 17 g Oral Daily PRN Anton Shrestha MD   sertraline 25 mg Oral HS Anton Shrestha MD   torsemide 20 mg Oral Daily Anton Shrestha MD        Objective:    Functional Update:  Mobility: reeval pending  Transfers: mod assist  ADLs: max lowers    Allergies per EMR    Physical Exam:  Temp:  [98 1 °F (36 7 °C)-99 3 °F (37 4 °C)] 98 1 °F (36 7 °C)  HR:  [68-72] 72  Resp:  [18-20] 20  BP: (113-124)/(54-60) 117/56  SpO2:  [92 %-99 %] 95 %    General: alert, no apparent distress, cooperative and comfortable  HEENT:  Head: Normocephalic, no lesions, without obvious abnormality  LUNGS:  no abnormal respiratory pattern, no retractions noted, non-labored breathing   ABDOMEN:  soft, non-tender   Bowel sounds normal  No masses, no organomegaly  EXTREMITIES: extremities normal, warm and well-perfused; no cyanosis, clubbing, or edema  NEURO:   mental status, speech normal, alert and oriented x3  PSYCH:  Alert and oriented, appropriate affect  Physical examination is otherwise unchanged from previous encounter, except as noted above  Diagnostic Studies: Reviewed, no new imaging  No orders to display       Laboratory: Reviewed  Results from last 7 days   Lab Units 10/23/19  0451   HEMOGLOBIN g/dL 8 1*   HEMATOCRIT % 24 1*   WBC Thousand/uL 6 50     Results from last 7 days   Lab Units 10/29/19  0525 10/28/19  0433 10/27/19  0446   BUN mg/dL 72* 63* 60*   SODIUM mmol/L 134* 135* 136*   POTASSIUM mmol/L 3 8 3 7 4 0   CHLORIDE mmol/L 89* 90* 93*   CREATININE mg/dL 2 08* 1 95* 1 90*            ** Please Note: Fluency Direct voice to text software may have been used in the creation of this document   **

## 2019-10-29 NOTE — ASSESSMENT & PLAN NOTE
Wt Readings from Last 3 Encounters:   10/29/19 83 6 kg (184 lb 4 9 oz)   10/28/19 76 kg (167 lb 8 8 oz)   10/27/19 81 9 kg (180 lb 8 9 oz)     Patient with acute on chronic diastolic CHF  She did require BiPAP, IV diuresis during her ICU stay  Patient did diurese well  Patient appears euvolemic today  She will continue her torsemide 20 mg daily  This was reduced secondary to worsening bicarb and renal function  Do need to continue to follow closely  Recommend daily weights with the same scale    Last echo was 10/21/2019  Showing moderate left ventricle systolic dysfunction with an EF 41%  Severe hypokinesis to akinesis in the distal half of the anterior, lateral and inferior walls with akinesis of the distal 3rd of the septum  The apex is akinetic to paradoxical  The  base of the heart contracts vigorously  Although these findings could be secondary to coronary artery disease, they also are consistent with Takotsubo's syndrome  Grade 2 left ventricle diastolic dysfunction  Moderate to severe MR  Calcified tricuspid aortic valve  Moderate TR    Mild IL

## 2019-10-29 NOTE — PROGRESS NOTES
OT LONG TERM GOALS       10/29/19 0700   Rehab Team Goals   ADL Team Goal Patient will require assist with ADLs with least restrictive device upon completion of rehab program   Rehab Team Interventions   OT Interventions Self Care; Therapeutic Exercise;Patient/Family Education;Group Therapy   Eating Goal   Eating Goal 06  Independent - Patient completes the activity by him/herself with no assistance from a helper  Assist Device Upper Dentures   Meal Complete All meals   Status Ongoing; Target goal - one week   Grooming Goal   Oral Hygiene Goal 06  Independent - Patient completes the activity by him/herself with no assistance from a helper  Task Wash/Dry Face;Wash/Dry Hands;Brush Teeth;Comb Hair;Initiate Task;Other   Environment Seated in Chair   Status Ongoing; Target goal - one week   Tub/Shower Transfer Goal   Method Shower Stall   Assist Device Tub Bench;Grab Bar;Hand Held Shower   Status Ongoing; Target goal - one week   Interventions ADL Training   Bathing Goal   Shower/bathe self Goal 03  Partial/moderate assistance - [place] does less than half the effort  [place] lifts or holds trunk or limbs and provides more than half the effort  Environment Seated;Standing; Shower   Adaptive Equipment Transfer bench;Grab Bar;Hand Held Shower   Status Ongoing; Target goal - one week   Intervention ADL Training; Therapeutic Exercise   Upper Body Dressing Goal   Upper body dressing Goal 04  Supervision or touching assistance- [place] provides VERBAL CUES or supervision throughout activity  Task Upper Body;Arms in/out; Over Head   Environment Seated   Status Ongoing; Target goal - one week   Intervention Tolerance Work   Lower Body Dressing Goal   Lower body dressing Goal 03  Partial/moderate assistance - [place] does less than half the effort  [place] lifts or holds trunk or limbs and provides more than half the effort  Putting on/taking off footwear Goal 09   Not applicable  (FAMILY ASSISTS)   Task Lower Body   Adaptive Equipment [employer]; Other;Dressing Stick   Environment Seated;Standing   Status Ongoing; Target goal - one week   Intervention Balance Work; Therapeutic Exercise; Tolerance Work   Toileting Transfer Goal   Toilet transfer Goal 04  Supervision or touching assistance- [place] provides VERBAL CUES or supervision throughout activity  Assistive Device SpokenLayer   Status Ongoing; Target goal - one week   Intervention ADL Training;Balance Work   Toileting Goal   Toileting hygiene Goal 03  Partial/moderate assistance - [place] does less than half the effort  [place] lifts or holds trunk or limbs and provides more than half the effort  Task Pants Up;Pants Down;Hygiene   Assistive Device Grab Bar   Safety Balance   Status Ongoing; Target goal - one week   Intervention ADL Training;Balance Work

## 2019-10-29 NOTE — PROGRESS NOTES
Progress Note - Nephrology   Agata Lu 80 y o  female MRN: [id number]  Unit/Bed#: [facility] 996-46 Encounter: [id number]      Assessment / Plan:  1  Acute kidney injury (POA) on CKD stage 3:  - HERLINDA most likely secondary to ischemic injury from hemodynamic perturbations plus renal venous congestion  - creatinine was improving with diuresis and now starting to trend up  - After review of records it appears that the patient has a baseline Creatinine of 1 5-1 6 mg/dL  - patient's creatinine today is at 2 08 mg/dL  - Avoid nephrotoxins, adjust meds to appropriate GFR  - concern for needing imaging for renal artery stenosis  Patient has a history of a stent on 1 side  Unable to get vascular doppler imaging  MRA puts patient at risk of NSF  MRA deferred by patient at this time  - f/u am BMP     2  Hypertension:  - goal -140s for renal perfusion  - Avoid BP fluctuations  - on metoprolol 12 5 mg p o  B i d  With hold parameters, torsemide 20mg daily     3  anemia:  - most recent hemoglobin at 8 1 grams/deciliter  - maintain hemoglobin greater than 8 grams/deciliter  - monitor CBC     4)Volume status with hx systolic CHF, EF 10%:  - Clinically patient appears to be euvolemic  - continue torsemide to 20 mg p o  Daily   - monitor daily weights, I/O  - should follow with cardiology     5  Hyponatremia:  - Likely due to decreased free water clearance and decreased distal sodium delivery  - Continue to monitor for now, most recent sodium 145 mEq     6  CKD stage 3:  - Most likely secondary to renal vascular disease plus age-related nephron loss  - will arrange for follow up post hospitalization   - follows with Dr Dell Ramos for nephrology as an outpatient     7  Subdural hematoma:  - management as per Primary team    8  Elevated total Co2 suggestive of alkalosis - serum bicarb stable on current dose diuretics  Monitor this  Subjective:   She denies any chest pain or shortness breath  She is urinating well    No complaints  Objective:     Vitals: Blood pressure 127/62, pulse 67, temperature 98 4 °F (36 9 °C), temperature source Tympanic, resp  rate 20, height 4' 11" (1 499 m), weight 83 6 kg (184 lb 4 9 oz), SpO2 97 %, not currently breastfeeding  ,Body mass index is 37 22 kg/m²  Temp (24hrs), Av 4 °F (36 9 °C), Min:98 1 °F (36 7 °C), Max:98 6 °F (37 °C)      Weight (last 2 days)     Date/Time   Weight    10/29/19 0600   83 6 (184 3)    10/28/19 1315   83 9 (184 97)                Intake/Output Summary (Last 24 hours) at 10/29/2019 1816  Last data filed at 10/29/2019 1248  Gross per 24 hour   Intake 420 ml   Output 250 ml   Net 170 ml     I/O last 24 hours: In: 80 [P O :780]  Out: 250 [Urine:250]        Physical Exam:   Physical Exam   Constitutional: She appears well-developed and well-nourished  No distress  HENT:   Head: Normocephalic and atraumatic  Mouth/Throat: No oropharyngeal exudate  Eyes: Right eye exhibits no discharge  Left eye exhibits no discharge  No scleral icterus  Neck: Neck supple  No thyromegaly present  Cardiovascular: Normal rate and regular rhythm  No murmur heard  Pulmonary/Chest: Effort normal and breath sounds normal  No respiratory distress  She has no wheezes  Abdominal: Soft  Bowel sounds are normal  She exhibits no distension  Musculoskeletal: She exhibits no edema  Neurological: She is alert  She exhibits normal muscle tone  awake   Skin: Skin is warm and dry  No rash noted  She is not diaphoretic  Psychiatric: She has a normal mood and affect  Her behavior is normal    Nursing note and vitals reviewed        Invasive Devices     Peripherally Inserted Central Catheter Line            PICC Line 10/22/19 Left Brachial 7 days                Medications:    Scheduled Meds:  Current Facility-Administered Medications:  acetaminophen 650 mg Oral Q6H PRN Anton Shrestha MD   albuterol 2 puff Inhalation Q4H PRN Michael Patricio MD   [START ON 10/30/2019] clopidogrel 75 mg Oral Daily Anton Shrestha MD   gabapentin 300 mg Oral TID Anton Shrestha MD   heparin (porcine) 5,000 Units Subcutaneous Q8H [address] Anton Shrestha MD   levothyroxine 112 mcg Oral Daily Anton Shrestha MD   lidocaine 3 patch Topical Daily PRN Antno Shrestha MD   methocarbamol 500 mg Oral Q6H PRN Anton Shrestha MD   metoprolol tartrate 12 5 mg Oral Q12H [address] Anton Shrestha MD   nystatin  Topical BID Ashwin Sims MD   [START ON 10/30/2019] pantoprazole 20 mg Oral Daily Before Breakfast Peter Littlejohn MD   polyethylene glycol 17 g Oral Daily PRN Anton Shrestha MD   sertraline 25 mg Oral HS Anton Shrestha MD   torsemide 20 mg Oral Daily Ashwin Sims MD       PRN Meds:   acetaminophen    albuterol    lidocaine    methocarbamol    polyethylene glycol    Continuous Infusions:         LAB RESULTS:      Results from last 7 days   Lab Units 10/29/19  0525 10/28/19  0433 10/27/19  0446 10/26/19  0641 10/25/19  0457 10/24/19  0428 10/23/19  0451   WBC Thousand/uL  --   --   --   --   --   --  6 50   HEMOGLOBIN g/dL  --   --   --   --   --   --  8 1*   HEMATOCRIT %  --   --   --   --   --   --  24 1*   PLATELETS Thousands/uL  --   --   --   --   --   --  202   NEUTROS PCT %  --   --   --   --   --   --  66*   LYMPHS PCT %  --   --   --   --   --   --  23*   MONOS PCT %  --   --   --   --   --   --  8   EOS PCT %  --   --   --   --   --   --  2   POTASSIUM mmol/L 3 8 3 7 4 0 4 1 4 3 3 7 4 1   CHLORIDE mmol/L 89* 90* 93* 93* 95* 96* 95*   CO2 mmol/L 36* 37* 36* 36* 37* 34* 33*   BUN mg/dL 72* 63* 60* 57* 55* 52* 51*   CREATININE mg/dL 2 08* 1 95* 1 90* 1 83* 1 69* 1 78* 1 84*   CALCIUM mg/dL 8 8 8 9 9 1 9 4 9 1 8 8 8 9   MAGNESIUM mg/dL  --   --  2 1  --   --   --  2 3   PHOSPHORUS mg/dL  --   --   --   --   --   --  4 4       CUTURES:  Lab Results   Component Value Date    BLOODCX No Growth After 5 Days   10/21/2019    URINECX >100,000 cfu/ml Escherichia coli 09/23/2016                 Portions of the record may have been created with voice recognition software  Occasional wrong word or "sound a like" substitutions may have occurred due to the inherent limitations of voice recognition software  Read the chart carefully and recognize, using context, where substitutions have occurred  If you have any questions, please contact the dictating provider

## 2019-10-29 NOTE — PROGRESS NOTES
Progress Note - Elli Beverage 1934, 80 y o  female MRN: [id number]    Unit/Bed#: [facility] 268-02 Encounter: [id number]    Primary Care Provider: Cheyenne Ken DO   Date and time admitted to hospital: 10/28/2019  1:22 PM    Acute on chronic diastolic heart failure [facility])  Assessment & Plan  Wt Readings from Last 3 Encounters:   10/29/19 83 6 kg (184 lb 4 9 oz)   10/28/19 76 kg (167 lb 8 8 oz)   10/27/19 81 9 kg (180 lb 8 9 oz)     Patient with acute on chronic diastolic CHF  She did require BiPAP, IV diuresis during her ICU stay  Patient did diurese well  Patient appears euvolemic today  She will continue her torsemide 20 mg daily  This was reduced secondary to worsening bicarb and renal function  Do need to continue to follow closely  Recommend daily weights with the same scale    Last echo was 10/21/2019  Showing moderate left ventricle systolic dysfunction with an EF 41%  Severe hypokinesis to akinesis in the distal half of the anterior, lateral and inferior walls with akinesis of the distal 3rd of the septum  The apex is akinetic to paradoxical  The  base of the heart contracts vigorously  Although these findings could be secondary to coronary artery disease, they also are consistent with Takotsubo's syndrome  Grade 2 left ventricle diastolic dysfunction  Moderate to severe MR  Calcified tricuspid aortic valve  Moderate TR  Mild ID    Renal artery stenosis [facility])  Assessment & Plan  Due for repeat renal u/s  Was unable to be completed during hospitalization secondary to patient discomfort  Can be coordinated as OP    Type 2 MI (myocardial infarction) [facility])  Assessment & Plan  Secondary to demand ischemia due to acute on chronic CHF exacerbation      Controlled type 2 diabetes mellitus with diabetic neuropathy, without long-term current use of insulin [facility])  Assessment & Plan  Lab Results   Component Value Date    HGBA1C 5 0 08/16/2019       Recent Labs     10/27/19  0606   POCGLU 121       Blood Sugar Average: Last 72 hrs:      blood sugars were well controlled during her hospitalization  Her last A1c was 5 0  She did not require any insulin during hospitalization  Recommend diabetic diet    Hyperlipidemia  Assessment & Plan  No statin secondary to significant myalgias  Ideally patient should be on a statin given her hx  Last lipid panel 10/23/2019: Total cholesterol 144  Triglycerides 223  HDL 33  LDL 66  AZUL (obstructive sleep apnea)  Assessment & Plan  No CPAP at home, new since recent admissions  Discussed with Dr Herber Newman, will get pulmonology involved and coordinate with nocturnal pulse ox to help with approval for home CPAP  Reviewed results of study for last p m  Essence Bradford Patient was on 2 L nasal cannula  Patient's pulse oximetry was 90-99% 86 6% of the time and 100% 13 4% of the time  Chronic anemia  Assessment & Plan  Stable  Most recent hemoglobin 8 1    Acute-on-chronic kidney injury [facility])  Assessment & Plan  Reviewed Nephrology consult  Patient with a baseline creatinine of 1 5-1 6  Her torsemide was reduced secondary to increasing renal function  She does need to have been repeat renal artery ultrasound secondary to history of renal artery stenosis  It was attempted to be completed during his recent hospitalization but was unable to be secondary to discomfort  Slight increase in creat yesterday today 1 95--> 2 08  Await renal input for today with further adjustment of torsemide    History of aortic valve replacement  Assessment & Plan  Bioprosthetic valve  Last echo was 10/21/2019  Showing moderate left ventricle systolic dysfunction with an EF 41%  Severe hypokinesis to akinesis in the distal half of the anterior, lateral and inferior walls with akinesis of the distal 3rd of the septum  The apex is akinetic to paradoxical  The  base of the heart contracts vigorously   Although these findings could be secondary to coronary artery disease, they also are consistent with Takotsubo's syndrome  Grade 2 left ventricle diastolic dysfunction  Moderate to severe MR  Calcified tricuspid aortic valve  Moderate TR  Mild [place]    Depression  Assessment & Plan   Stable  Continue sertraline 25 mg daily    Essential hypertension  Assessment & Plan   Blood pressure is controlled  Continue metoprolol 25 mg b i d and torsemide 20 mg daily    Hypothyroidism  Assessment & Plan   Continue levothyroxine 112 mcg daily  TSH within normal limits 10/21/2019    * SDH (subdural hematoma) [facility])  Assessment & Plan  Secondary to a fall 10/8  Patient was admitted to [facility] 10/8 through 10/10  She was evaluated by Neurosurgery who recommended conservative management of SDH  VTE Pharmacologic Prophylaxis:   Pharmacologic: Heparin    Current Length of Stay: 1 day(s)    Current Patient Status: Inpatient Rehab     Discharge Plan: As per treatment team    Code Status: Level 1 - Full Code    Subjective:   Pt is doing well  No overnight concerns  Patient was seen in therapy gym this morning  She just completed walking with her walker and PT  She denied shortness for exertion or at rest   She reports she slept well at night with the 2 L nasal cannula  Denies CP, palpitations, dizziness, lightheadedness  Denies fever, chills, nausea, vomiting    Objective:     Vitals:   Temp (24hrs), Av 4 °F (36 9 °C), Min:98 1 °F (36 7 °C), Max:98 6 °F (37 °C)    Temp:  [98 1 °F (36 7 °C)-98 6 °F (37 °C)] 98 1 °F (36 7 °C)  HR:  [68-72] 72  Resp:  [18-20] 20  BP: (113-122)/(54-60) 117/56  SpO2:  [92 %-99 %] 95 %  Body mass index is 37 22 kg/m²  Review of Systems   Constitutional: Negative for appetite change, chills, fatigue, fever and unexpected weight change  Respiratory: Negative for cough, chest tightness, shortness of breath and wheezing  Cardiovascular: Negative for chest pain, palpitations and leg swelling     Gastrointestinal: Negative for abdominal pain, constipation, diarrhea, nausea and vomiting  Musculoskeletal: Positive for back pain  Negative for arthralgias and myalgias  Neurological: Negative for dizziness, light-headedness and headaches         + restless leg   Psychiatric/Behavioral: Positive for sleep disturbance (related to restless leg)  Input and Output Summary (last 24 hours): Intake/Output Summary (Last 24 hours) at 10/29/2019 1352  Last data filed at 10/29/2019 1248  Gross per 24 hour   Intake 780 ml   Output 250 ml   Net 530 ml     Physical Exam:     Physical Exam   Constitutional: She is oriented to person, place, and time  She appears well-developed and well-nourished  No distress  euvolemic   Neck: No JVD present  Cardiovascular: Normal rate, regular rhythm and normal heart sounds  No murmur heard  Trace LE edema B/L   Pulmonary/Chest: Effort normal and breath sounds normal  No respiratory distress  She has no wheezes  Neurological: She is alert and oriented to person, place, and time  No focal deficits   Skin: Skin is warm and dry  Psychiatric: She has a normal mood and affect  Her behavior is normal  Judgment and thought content normal    Nursing note and vitals reviewed      Additional Data:     Labs:    Results from last 7 days   Lab Units 10/23/19  0451   WBC Thousand/uL 6 50   HEMOGLOBIN g/dL 8 1*   HEMATOCRIT % 24 1*   PLATELETS Thousands/uL 202   NEUTROS PCT % 66*   LYMPHS PCT % 23*   MONOS PCT % 8   EOS PCT % 2     Results from last 7 days   Lab Units 10/29/19  0525   SODIUM mmol/L 134*   POTASSIUM mmol/L 3 8   CHLORIDE mmol/L 89*   CO2 mmol/L 36*   BUN mg/dL 72*   CREATININE mg/dL 2 08*   ANION GAP mmol/L 9   CALCIUM mg/dL 8 8   GLUCOSE RANDOM mg/dL 112*         Results from last 7 days   Lab Units 10/27/19  0606 10/26/19  0556 10/25/19  2028 10/25/19  1538 10/25/19  1104 10/25/19  0554 10/24/19  2109 10/24/19  1555 10/24/19  1058 10/24/19  0538 10/23/19  2025 10/23/19  1536   POC GLUCOSE mg/dl 121 103 154* 130 123 107 127 144* 221* 163* 163* 155*             Reviewed labs  Reviewed Images      Last 24 Hours Medication List:     Current Facility-Administered Medications:  acetaminophen 650 mg Oral Q6H PRN Anton Shrestha MD   albuterol 2 puff Inhalation Q4H PRN Amor Monroy MD   [START ON 10/30/2019] clopidogrel 75 mg Oral Daily Anton Shrestha MD   gabapentin 300 mg Oral TID Anton Shrestha MD   heparin (porcine) 5,000 Units Subcutaneous Q8H [address] Anton Myles MD   levothyroxine 112 mcg Oral Daily Anton Shrestha MD   lidocaine 3 patch Topical Daily PRN Anton Shrestha MD   methocarbamol 500 mg Oral Q6H PRN Anton Shrestha MD   metoprolol tartrate 12 5 mg Oral Q12H [address] Anton Shrestha MD   nystatin  Topical BID Anton Shrestha MD   pantoprazole 40 mg Oral Daily Before Breakfast Anton Shrestha MD   polyethylene glycol 17 g Oral Daily PRN Anton Shrestha MD   sertraline 25 mg Oral HS Anton Shrestha MD   torsemide 20 mg Oral Daily Amor Monroy MD      M*Modal software was used to dictate this note  It may contain errors with dictating incorrect words or incorrect spelling  Please contact the provider directly with any questions

## 2019-10-29 NOTE — ASSESSMENT & PLAN NOTE
Bioprosthetic valve  Last echo was 10/21/2019  Showing moderate left ventricle systolic dysfunction with an EF 41%  Severe hypokinesis to akinesis in the distal half of the anterior, lateral and inferior walls with akinesis of the distal 3rd of the septum  The apex is akinetic to paradoxical  The  base of the heart contracts vigorously  Although these findings could be secondary to coronary artery disease, they also are consistent with Takotsubo's syndrome  Grade 2 left ventricle diastolic dysfunction  Moderate to severe MR  Calcified tricuspid aortic valve  Moderate TR    Mild [place]

## 2019-10-30 LAB
ABO GROUP BLD: NORMAL
ANION GAP SERPL CALCULATED.3IONS-SCNC: 8 MMOL/L (ref 5–14)
ANISOCYTOSIS BLD QL SMEAR: PRESENT
BASOPHILS # BLD AUTO: 0.05 THOUSAND/UL (ref 0–0.1)
BASOPHILS NFR MAR MANUAL: 1 % (ref 0–1)
BLD GP AB SCN SERPL QL: NEGATIVE
BUN SERPL-MCNC: 72 MG/DL (ref 5–25)
CALCIUM SERPL-MCNC: 8.9 MG/DL (ref 8.4–10.2)
CHLORIDE SERPL-SCNC: 89 MMOL/L (ref 97–108)
CO2 SERPL-SCNC: 36 MMOL/L (ref 22–30)
CREAT SERPL-MCNC: 1.99 MG/DL (ref 0.6–1.2)
EOSINOPHIL # BLD AUTO: 0.16 THOUSAND/UL (ref 0–0.4)
EOSINOPHIL NFR BLD MANUAL: 3 % (ref 0–6)
ERYTHROCYTE [DISTWIDTH] IN BLOOD BY AUTOMATED COUNT: 16 %
GFR SERPL CREATININE-BSD FRML MDRD: 22 ML/MIN/1.73SQ M
GLUCOSE SERPL-MCNC: 102 MG/DL (ref 70–99)
HCT VFR BLD AUTO: 23 % (ref 36–46)
HGB BLD-MCNC: 7.6 G/DL (ref 12–16)
HYPERCHROMIA BLD QL SMEAR: PRESENT
LYMPHOCYTES # BLD AUTO: 1.09 THOUSAND/UL (ref 0.5–4)
LYMPHOCYTES # BLD AUTO: 21 % (ref 25–45)
MCH RBC QN AUTO: 32.6 PG (ref 26.8–34.3)
MCHC RBC AUTO-ENTMCNC: 33 G/DL (ref 31.4–37.4)
MCV RBC AUTO: 99 FL (ref 80–100)
METAMYELOCYTES NFR BLD MANUAL: 2 % (ref 0–1)
MONOCYTES # BLD AUTO: 0.47 THOUSAND/UL (ref 0.2–0.9)
MONOCYTES NFR BLD AUTO: 9 % (ref 1–10)
NEUTS SEG # BLD: 3.33 THOUSAND/UL (ref 1.8–7.8)
NEUTS SEG NFR BLD AUTO: 64 %
NRBC BLD AUTO-RTO: 1 /100 WBC (ref 0–2)
PLATELET # BLD AUTO: 197 THOUSANDS/UL (ref 150–450)
PLATELET BLD QL SMEAR: ADEQUATE
PMV BLD AUTO: 7.6 FL (ref 8.9–12.7)
POLYCHROMASIA BLD QL SMEAR: PRESENT
POTASSIUM SERPL-SCNC: 3.8 MMOL/L (ref 3.6–5)
RBC # BLD AUTO: 2.33 MILLION/UL (ref 4–5.2)
RBC MORPH BLD: ABNORMAL
RH BLD: NEGATIVE
SODIUM SERPL-SCNC: 133 MMOL/L (ref 137–147)
SPECIMEN EXPIRATION DATE: NORMAL
TOTAL CELLS COUNTED SPEC: 100
WBC # BLD AUTO: 5.2 THOUSAND/UL (ref 4.31–10.16)

## 2019-10-30 PROCEDURE — 97530 THERAPEUTIC ACTIVITIES: CPT

## 2019-10-30 PROCEDURE — 80048 BASIC METABOLIC PNL TOTAL CA: CPT | Performed by: PHYSICAL MEDICINE & REHABILITATION

## 2019-10-30 PROCEDURE — 99232 SBSQ HOSP IP/OBS MODERATE 35: CPT | Performed by: PHYSICAL MEDICINE & REHABILITATION

## 2019-10-30 PROCEDURE — 97535 SELF CARE MNGMENT TRAINING: CPT

## 2019-10-30 PROCEDURE — 85007 BL SMEAR W/DIFF WBC COUNT: CPT | Performed by: PHYSICAL MEDICINE & REHABILITATION

## 2019-10-30 PROCEDURE — P9016 RBC LEUKOCYTES REDUCED: HCPCS

## 2019-10-30 PROCEDURE — 97110 THERAPEUTIC EXERCISES: CPT

## 2019-10-30 PROCEDURE — 97116 GAIT TRAINING THERAPY: CPT

## 2019-10-30 PROCEDURE — 86923 COMPATIBILITY TEST ELECTRIC: CPT

## 2019-10-30 PROCEDURE — 30283B1 TRANSFUSION OF NONAUTOLOGOUS 4-FACTOR PROTHROMBIN COMPLEX CONCENTRATE INTO VEIN, PERCUTANEOUS APPROACH: ICD-10-PCS | Performed by: PHYSICAL MEDICINE & REHABILITATION

## 2019-10-30 PROCEDURE — 86900 BLOOD TYPING SEROLOGIC ABO: CPT | Performed by: NURSE PRACTITIONER

## 2019-10-30 PROCEDURE — 99232 SBSQ HOSP IP/OBS MODERATE 35: CPT | Performed by: NURSE PRACTITIONER

## 2019-10-30 PROCEDURE — 99232 SBSQ HOSP IP/OBS MODERATE 35: CPT | Performed by: INTERNAL MEDICINE

## 2019-10-30 PROCEDURE — 86850 RBC ANTIBODY SCREEN: CPT | Performed by: NURSE PRACTITIONER

## 2019-10-30 PROCEDURE — 86901 BLOOD TYPING SEROLOGIC RH(D): CPT | Performed by: NURSE PRACTITIONER

## 2019-10-30 PROCEDURE — 85027 COMPLETE CBC AUTOMATED: CPT | Performed by: PHYSICAL MEDICINE & REHABILITATION

## 2019-10-30 RX ORDER — FUROSEMIDE 10 MG/ML
20 INJECTION INTRAMUSCULAR; INTRAVENOUS ONCE
Status: COMPLETED | OUTPATIENT
Start: 2019-10-30 | End: 2019-10-30

## 2019-10-30 RX ADMIN — ACETAMINOPHEN 650 MG: 325 TABLET ORAL at 06:29

## 2019-10-30 RX ADMIN — LIDOCAINE 2 PATCH: 50 PATCH CUTANEOUS at 09:06

## 2019-10-30 RX ADMIN — METHOCARBAMOL 500 MG: 500 TABLET ORAL at 22:15

## 2019-10-30 RX ADMIN — TORSEMIDE 20 MG: 20 TABLET ORAL at 08:20

## 2019-10-30 RX ADMIN — METHOCARBAMOL 500 MG: 500 TABLET ORAL at 06:29

## 2019-10-30 RX ADMIN — GABAPENTIN 300 MG: 300 CAPSULE ORAL at 15:53

## 2019-10-30 RX ADMIN — METHOCARBAMOL 500 MG: 500 TABLET ORAL at 15:53

## 2019-10-30 RX ADMIN — HEPARIN SODIUM 5000 UNITS: 5000 INJECTION INTRAVENOUS; SUBCUTANEOUS at 14:55

## 2019-10-30 RX ADMIN — FUROSEMIDE 20 MG: 10 INJECTION, SOLUTION INTRAMUSCULAR; INTRAVENOUS at 22:14

## 2019-10-30 RX ADMIN — LEVOTHYROXINE SODIUM 112 MCG: 112 TABLET ORAL at 06:30

## 2019-10-30 RX ADMIN — ACETAMINOPHEN 650 MG: 325 TABLET ORAL at 22:15

## 2019-10-30 RX ADMIN — CLOPIDOGREL BISULFATE 75 MG: 75 TABLET, FILM COATED ORAL at 08:20

## 2019-10-30 RX ADMIN — SERTRALINE 25 MG: 25 TABLET, FILM COATED ORAL at 22:16

## 2019-10-30 RX ADMIN — NYSTATIN: 100000 POWDER TOPICAL at 08:23

## 2019-10-30 RX ADMIN — METOPROLOL TARTRATE 12.5 MG: 25 TABLET ORAL at 20:29

## 2019-10-30 RX ADMIN — NYSTATIN: 100000 POWDER TOPICAL at 17:35

## 2019-10-30 RX ADMIN — GABAPENTIN 300 MG: 300 CAPSULE ORAL at 20:30

## 2019-10-30 RX ADMIN — ACETAMINOPHEN 650 MG: 325 TABLET ORAL at 15:53

## 2019-10-30 RX ADMIN — HEPARIN SODIUM 5000 UNITS: 5000 INJECTION INTRAVENOUS; SUBCUTANEOUS at 06:30

## 2019-10-30 RX ADMIN — METOPROLOL TARTRATE 12.5 MG: 25 TABLET ORAL at 08:20

## 2019-10-30 RX ADMIN — PANTOPRAZOLE 20 MG: 20 TABLET, DELAYED RELEASE ORAL at 06:29

## 2019-10-30 RX ADMIN — GABAPENTIN 300 MG: 300 CAPSULE ORAL at 08:20

## 2019-10-30 NOTE — PROGRESS NOTES
NEPHROLOGY PROGRESS NOTE   Diana Price 80 y o  female MRN: [id number]  Unit/Bed#: -02 Encounter: [id number]  Reason for Consult: HERLINDA on CKD III    ASSESSMENT/PLAN:  HERLINDA (POA) on CKD III:  Likely secondary to ischemic injury as well as renal vascular congestion   -baseline creatinine 1 5-1 6   -creatinine stable at 1 9   -chronic disease likely secondary to renovascular disease plus age-related nephron loss   -may need imaging for renal artery stenosis, history of renal stent  MRI being deferred by patient at this time due to risk for NSF    -continue to avoid nephrotoxins  -will need repeat renal ultrasound when patient is able to tolerate  -I/O   -will need follow-up with Dr Merlos at discharge  Hypertension:  Blood pressure is stable   -avoid episodes of hypotension or high fluctuations in blood pressure   -continue metoprolol and torsemide with holding parameters   -goal systolic blood pressure 881 to 140 for optimal renal perfusion  Volume status/history of systolic CHF:  With ejection fraction of 22%, grade 2 diastolic dysfunction   -continue to monitor daily weights and I/O   -continue on torsemide 20 mg daily   -continue fluid restriction   -chest x-ray on 10/26 showed small bilateral pleural effusions, pulmonary vascular congestion    Mild hyponatremia:  Likely volume mediated  -continue to monitor   -placed on fluid restriction   -continue on current dose of diuretic  Elevated bicarbonate level:  Stable   -will continue to low current diuretic dose  Subdural hematoma:  Further care per primary team     Anemia: Hgb 7 6  C/O SOB with exertion    -continue to monitor and transfuse as needed for hemoglobin less than 7 5   -okay to transfuse with 1 unit of PRBCs  Would give lasix 20 mg IV post transfusion  Disposition:  Requiring additional inpatient stay due to rehabilitation needs  SUBJECTIVE:  The patient is working with therapy in ADLs room  She denies chest pain    She states that she feels short of breath with exertion  She denies nausea, vomiting, diarrhea  She denies issues with urination      OBJECTIVE:  Current Weight: Weight - Scale: 83 8 kg (184 lb 11 9 oz)  Vitals:    10/29/19 1514 10/29/19 2137 10/30/19 0600 10/30/19 0700   BP: 127/62 131/60  117/52   BP Location: Right arm   Right arm   Pulse: 67 73  65   Resp: 20   18   Temp: 98 4 °F (36 9 °C)   (!) 97 2 °F (36 2 °C)   TempSrc: Tympanic   Tympanic   SpO2: 97%   94%   Weight:   83 8 kg (184 lb 11 9 oz)    Height:           Intake/Output Summary (Last 24 hours) at 10/30/2019 1259  Last data filed at 10/30/2019 1252  Gross per 24 hour   Intake 510 ml   Output --   Net 510 ml     General: No apparent distress  Skin: warm, dry, intact, no rash  HEENT: Moist mucous membranes, sclera anicteric, normocephalic  Neck: No apparent JVD appreciated  Chest: Lung sounds clear B/L, on RA  Heart: Regular rate and rhythm, No murmer  Abdomen: Soft, round, NT, +BS  Extremities: B/L LE edema present  Neuro: Alert and oriented  Psych: Appropriate mood and affect    Medications:    Current Facility-Administered Medications:     acetaminophen (TYLENOL) tablet 650 mg, 650 mg, Oral, Q6H PRN, Anton Shrestha MD, 650 mg at 10/30/19 0629    albuterol (PROVENTIL HFA,VENTOLIN HFA) inhaler 2 puff, 2 puff, Inhalation, Q4H PRN, Narinder Almanza MD    clopidogrel (PLAVIX) tablet 75 mg, 75 mg, Oral, Daily, Anton Shrestha MD, 75 mg at 10/30/19 0820    gabapentin (NEURONTIN) capsule 300 mg, 300 mg, Oral, TID, Anton Shrestha MD, 300 mg at 10/30/19 0820    heparin (porcine) subcutaneous injection 5,000 Units, 5,000 Units, Subcutaneous, Q8H [address], Anton Shrestha MD, 5,000 Units at 10/30/19 0630    levothyroxine tablet 112 mcg, 112 mcg, Oral, Daily, Anton Shrestha MD, 112 mcg at 10/30/19 0630    lidocaine (LIDODERM) 5 % patch 3 patch, 3 patch, Topical, Daily PRN, Narinder Almanza MD, 2 patch at 10/30/19 0906    methocarbamol (ROBAXIN) tablet 500 mg, 500 mg, Oral, Q6H PRN, Alvarado Spring MD, 500 mg at 10/30/19 0629    metoprolol tartrate (LOPRESSOR) partial tablet 12 5 mg, 12 5 mg, Oral, Q12H [facility], Anton Shrestha MD, 12 5 mg at 10/30/19 0820    nystatin (MYCOSTATIN) powder, , Topical, BID, Anton Shrestha MD    pantoprazole (PROTONIX) EC tablet 20 mg, 20 mg, Oral, Daily Before Breakfast, Ezequiel Vu MD, 20 mg at 10/30/19 5516    polyethylene glycol (MIRALAX) packet 17 g, 17 g, Oral, Daily PRN, Alvarado Spring MD    sertraline (ZOLOFT) tablet 25 mg, 25 mg, Oral, HS, Anton Shrestha MD, 25 mg at 10/29/19 2138    torsemide (DEMADEX) tablet 20 mg, 20 mg, Oral, Daily, Alvarado Spring MD, 20 mg at 10/30/19 0820    Laboratory Results:  Results from last 7 days   Lab Units 10/30/19  0538 10/29/19  0525 10/28/19  0433 10/27/19  0446 10/26/19  0641 10/25/19  0457 10/24/19  0428   WBC Thousand/uL 5 20  --   --   --   --   --   --    HEMOGLOBIN g/dL 7 6*  --   --   --   --   --   --    HEMATOCRIT % 23 0*  --   --   --   --   --   --    PLATELETS Thousands/uL 197  --   --   --   --   --   --    POTASSIUM mmol/L 3 8 3 8 3 7 4 0 4 1 4 3 3 7   CHLORIDE mmol/L 89* 89* 90* 93* 93* 95* 96*   CO2 mmol/L 36* 36* 37* 36* 36* 37* 34*   BUN mg/dL 72* 72* 63* 60* 57* 55* 52*   CREATININE mg/dL 1 99* 2 08* 1 95* 1 90* 1 83* 1 69* 1 78*   CALCIUM mg/dL 8 9 8 8 8 9 9 1 9 4 9 1 8 8   MAGNESIUM mg/dL  --   --   --  2 1  --   --   --

## 2019-10-30 NOTE — PCC PHYSICAL THERAPY
10/30/2019: Pt presents with gait instability and limited act tolerance follwing re-admission to the [facility]  Physical impairments include: dec strength, balance, righting reactions, act tolerance and functional mobility  Pt household ambulation only PTA with family using transport chair for longer community distances  Pt lives in apartment attached to dtr and [place] house with ramp to enter and first floor set-up  SpO2 holding steady with therapy and activity 94% or above on RA  BLE weakness and BUE chronic shoulder pain making STS transfers from lower surfaces challenging  Pt states she has lift chair at home which she sleeps  D/c plan and recommendations TBD pending pt progress

## 2019-10-30 NOTE — ASSESSMENT & PLAN NOTE
No CPAP at home, new since recent admissions  Discussed with Dr Mruali Patel, will get pulmonology involved and coordinate with nocturnal pulse ox to help with approval for home CPAP  Reviewed results of study for last ttii [place] Patient was on 2 L nasal cannula  Patient's pulse oximetry was 90-99% 86 6% of the time and 100% 13 4% of the time

## 2019-10-30 NOTE — PCC NURSING
Pt  Admitted S/P fall on October 8th which she sustained a SDH which was being treated conservatively  Pt discharged  Home on October 10th and readmitted to hospital on October 12th with gait instability and weakness  Pt  Had acute respiratory distress that admission and was in ICU  Patient's condition improved and was initially admitted to the [facility] on 10/19/19  Pt  Again was having pulmonary issues with an increased BNP and was transferred to the acute side of the hospital on 10/21  Pt  Admitted back to [facility] on 10/28  Pt has history of hypothyroidism, CVA, HTN, CHF, depression, aortic valve replacement, CKD stage 3, renal artery stent, DM, hyperlipidemia, neuropathy, arthritis, fibromyalgia, compression fracture of thoracic verebra, cervical fracture with fixation, obstructive sleep apnea, breast CA with lumpectomy, and left total knee replacement  Pt  Is taking Tylenol 650 mg prn for pain control as well as Lidoderm patches to bilateral shoulders for same  Pt  Has albuterol inhaler puffs prn for SOB  Pt  Recently started on Plavix 75 mg  Pt  Wears venodynes and receives Heparin injections for anticoagulation therapy  Pt  Takes Levothyroxine 112 mcg tablet for hypothyroidism  Robaxin 500 mg given prn for muscle spasms in neck and shoulders  Pt  Takes Lopressor 12 5 mg BID for cardiac issues including HTN  Nystatin powder applied to areas of excoriation under breasts, groin and abdominal folds  Pt  Is also taking Zoloft 25 mg daily at bedtime for depression and Demadex 20 mg daily for hx  Of CHF  Pt  Is on a cardiac, CCD diet with a fluid restriction of 1,200 cc  Pt  Has a double lumen PICC in the left arm for blood draws because she is a difficult stick  Pt  Had an overnight pulse ox study performed to determine whether she needs to wear her Bipap at home  Pt  Is mod assist with rolling walker and max assist with bed mobility  This week we will monitor vital signs and labs   Daily weight will also be monitored daily on same scale for consistency  Pain will be managed with Tylenol, Robaxin and Lidoderm patches  We will prevent skin breakdown by turning and repositioning patient and off loading pressure  Pt 's sacrum is pink, but blanchable  Calazime ointment applied  Right abdominal fold excoriation with mild bleeding on and off  Area cleansed with NSS and ABD applied to absorb moisture  We will encourage independence with ADLs and keep patient safe from falls

## 2019-10-30 NOTE — PROGRESS NOTES
10/30/19 0874   Pain Assessment   Pain Assessment 0-10   Pain Score 5   Pain Type Chronic pain   Pain Location Shoulder   Pain Orientation Bilateral   Pain Descriptors Aching   Pain Frequency Constant/continuous   Pain Onset Ongoing   Effect of Pain on Daily Activities inc time for STS and SPT    Hospital Pain Intervention(s) Medication (See MAR); Repositioned; Ambulation/increased activity   Response to Interventions tolerated session, nrsing admin pain patch to B/L shoulders during session  Restrictions/Precautions   Precautions Fall Risk;Pain   Weight Bearing Restrictions No   ROM Restrictions No   Cognition   Overall Cognitive Status WFL   Arousal/Participation Alert; Cooperative   Subjective   Subjective Pt reports slept poorly last night agreeable to session  Sit to Stand   Type of Assistance Needed Physical assistance; Adaptive equipment   Amount of Physical Assistance Provided 25%-49%   Comment inc time required, Mark initially to boost, CGA after adding cushion to chair, CGA from chairs in PT gym  Sit to Stand CARE Score 3   Bed-Chair Transfer   Type of Assistance Needed Incidental touching; Adaptive equipment   Amount of Physical Assistance Provided No physical assistance   Comment CGA w RW    Chair/Bed-to-Chair Transfer CARE Score 4   Transfer Bed/Chair/Wheelchair   Limitations Noted In Balance; Endurance; Sequencing;UE Strength;LE Strength   Adaptive Equipment Roller Walker   Stand Pivot Contact Guard   Sit to Stand Minimal Assist;Contact Guard   Stand to [facility] time to transition UE to RW upon standing, At start of session Mark boost to stand from w/c, after adding cushion CGA  CGA to stand from white chairs in PT gym during repetitive SPT training  Walk 10 Feet   Type of Assistance Needed Adaptive equipment; Incidental touching   Amount of Physical Assistance Provided Less than 25%   Comment CGA w RW    Walk 10 Feet CARE Score 3   Walk 50 Feet with Two Turns   Type of Assistance Needed Adaptive equipment; Incidental touching   Amount of Physical Assistance Provided Less than 25%   Comment CGA w RW    Walk 50 Feet with Two Turns CARE Score 3   Walk 150 Feet   Reason if not Attempted Activity not applicable   Walk 598 Feet CARE Score 9   Ambulation   Does the patient walk? 2  Yes   Primary Mode of Locomotion Prior to Admission Walk   Distance Walked (feet) 60 ft  (x2 )   Assist Device Roller Walker   Gait Pattern Decreased foot clearance;R foot drag;L foot drag; Forward Flexion; Wide JEFE;Step through   Limitations Noted In Endurance; Heel Strike;Speed;Strength;Swing   Provided Assistance with: Balance;Direction   Walk Assist Level Contact Guard   Findings Dec foot clearance BLE, forward flexion, when cued to pick head up to see where pt is ambulating states it causes pain in neck from prior injuury in [facility]  Wheel 50 Feet with Two Turns   Reason if not Attempted Activity not applicable   Wheel 50 Feet with Two Turns CARE Score 9   Wheel 150 Feet   Reason if not Attempted Activity not applicable   Wheel 818 Feet CARE Score 9   Wheelchair mobility   Does the patient use a wheelchair? 0  No   Findings Pt family pushes pt in transport chair for longer distance community mobility  Curb or Single Stair   Reason if not Attempted Activity not applicable   1 Step (Curb) CARE Score 9   4 Steps   Reason if not Attempted Activity not applicable   4 Steps CARE Score 9   12 Steps   Reason if not Attempted Activity not applicable   12 Steps CARE Score 9   Picking Up Object   Reason if not Attempted Safety concerns   Picking Up Object CARE Score 88   Therapeutic Interventions   Strengthening Seated TE with 2# BLE 2x10: LAQ, Marching, hip ABD green tband, manually resisted hip ADD      Flexibility BLE seated passive hs and gastroc stretch x4min total     Neuromuscular Re-Education static standing unsupported with AD infront for safety 30sec x3    Other Repetitive STS x5 and SPT x8 w/c <> white chairs in PT gym  Added cushion to w/c as pt was struggling to get up from surface requiring ModA initally  able to complete CGA after adding cushion  Assessment   Treatment Assessment Pt participated in skilled PT session with focus on inc balance, functional mobility, transfers, LE strengthening and ROM  Pt SpO2 monitiored t/o session with lowest reading 94% on RA after 60 foot walk  Pt cont to present with BLE weakness and general dec act tolerance  FOcused on repetitive STS and SPT for inc safety with functional trasnfers  Cont to benefit from skilled PT to inc strength, act tolerance, balance and righting reactions to maximize functional indpendence and overall safety with mobility  Barriers to Discharge Decreased caregiver support   PT Barriers   Physical Impairment Decreased strength;Decreased endurance; Impaired balance;Decreased mobility;Pain;Decreased skin integrity;Obesity   Functional Limitation Car transfers; Ramp negotiation;Standing;Transfers; Walking   Plan   Treatment/Interventions Functional transfer training;LE strengthening/ROM; Therapeutic exercise; Endurance training;Patient/family training;Equipment eval/education; Bed mobility;Gait training   Progress Progressing toward goals   Recommendation   Recommendation Other (Comment)  (TBD pending pt progress )   Equipment Recommended Walker   PT Therapy Minutes   PT Time In 0830   PT Time Out 1000   PT Total Time (minutes) 90   PT Mode of treatment - Individual (minutes) 90   PT Mode of treatment - Concurrent (minutes) 0   PT Mode of treatment - Group (minutes) 0   PT Mode of treatment - Co-treat (minutes) 0   PT Mode of Treatment - Total time(minutes) 90 minutes   PT Cumulative Minutes 180   Therapy Time missed   Time missed?  No

## 2019-10-30 NOTE — PROGRESS NOTES
Progress Note - Pako Wood 1934, 80 y o  female MRN: [id number]    Unit/Bed#: [facility] 268-02 Encounter: [id number]    Primary Care Provider: Barney Carter DO   Date and time admitted to hospital: 10/28/2019  1:22 PM    Chronic anemia  Assessment & Plan  Reviewed nephrology consult and recommend transfuse if patient's hemoglobin is less than eight  Her hemoglobin did drop from 8 1-->7 6  Discussed with patient that was the recommendation to transfuse  Patient has had transfusions in the past and has no objections  However she does wish to speak with renal prior to proceeding forward  Patient is hesitant as she feels there are "too many doctors" and wants to make sure everyone is on the same page  Iron panel and folate reviewed from 10/21 within normal limits    Acute-on-chronic kidney injury [facility])  Assessment & Plan  Reviewed Nephrology consult  Patient with a baseline creatinine of 1 5-1 6  Her torsemide was reduced secondary to increasing renal function  She does need to have been repeat renal artery ultrasound secondary to history of renal artery stenosis  It was attempted to be completed during his recent hospitalization but was unable to be secondary to discomfort  Creatinine has slightly decreased  1 95--> 2 08 --> 1 99    Acute on chronic diastolic heart failure (HCC)  Assessment & Plan  Wt Readings from Last 3 Encounters:   10/30/19 83 8 kg (184 lb 11 9 oz)   10/28/19 76 kg (167 lb 8 8 oz)   10/27/19 81 9 kg (180 lb 8 9 oz)     Patient with acute on chronic diastolic CHF  She did require BiPAP, IV diuresis during her ICU stay  Patient did diurese well  Patient appears euvolemic today  She will continue her torsemide 20 mg daily  This was reduced secondary to worsening bicarb and renal function  Do need to continue to follow closely  Recommend daily weights with the same scale    Last echo was 10/21/2019  Showing moderate left ventricle systolic dysfunction with an EF 41%    Severe hypokinesis to akinesis in the distal half of the anterior, lateral and inferior walls with akinesis of the distal 3rd of the septum  The apex is akinetic to paradoxical  The  base of the heart contracts vigorously  Although these findings could be secondary to coronary artery disease, they also are consistent with Takotsubo's syndrome  Grade 2 left ventricle diastolic dysfunction  Moderate to severe MR  Calcified tricuspid aortic valve  Moderate TR  Mild MT    No signs or symptoms of fluid overload today  Renal artery stenosis [facility])  Assessment & Plan  Due for repeat renal u/s  Was unable to be completed during hospitalization secondary to patient discomfort  Can be coordinated as OP    Type 2 MI (myocardial infarction) [facility])  Assessment & Plan  Secondary to demand ischemia due to acute on chronic CHF exacerbation  Controlled type 2 diabetes mellitus with diabetic neuropathy, without long-term current use of insulin ([facility] [address] )  Assessment & Plan  Lab Results   Component Value Date    HGBA1C 5 0 08/16/2019       No results for input(s): POCGLU in the last 72 hours  Blood Sugar Average: Last 72 hrs:      blood sugars were well controlled during her hospitalization  Her last A1c was 5 0  She did not require any insulin during hospitalization  Recommend diabetic diet    Hyperlipidemia  Assessment & Plan  No statin secondary to significant myalgias  Ideally patient should be on a statin given her hx  Last lipid panel 10/23/2019: Total cholesterol 144  Triglycerides 223  HDL 33  LDL 66  AZUL (obstructive sleep apnea)  Assessment & Plan  No CPAP at home, new since recent admissions  Discussed with Dr Herber Newman, lacey get pulmonology involved and coordinate with nocturnal pulse ox to help with approval for home CPAP  Reviewed results of study for last p m  [facility] Patient was on 2 L nasal cannula  Patient's pulse oximetry was 90-99% 86 6% of the time and 100% 13 4% of the time      History of aortic valve replacement  Assessment & Plan  Bioprosthetic valve  Last echo was 10/21/2019  Showing moderate left ventricle systolic dysfunction with an EF 41%  Severe hypokinesis to akinesis in the distal half of the anterior, lateral and inferior walls with akinesis of the distal 3rd of the septum  The apex is akinetic to paradoxical  The  base of the heart contracts vigorously  Although these findings could be secondary to coronary artery disease, they also are consistent with Takotsubo's syndrome  Grade 2 left ventricle diastolic dysfunction  Moderate to severe MR  Calcified tricuspid aortic valve  Moderate TR  Mild MS    Depression  Assessment & Plan  Stable  Continue sertraline 25 mg daily    Essential hypertension  Assessment & Plan  Blood pressure is controlled  Continue metoprolol 25 mg b i d and torsemide 20 mg daily    Hypothyroidism  Assessment & Plan   Continue levothyroxine 112 mcg daily  TSH within normal limits 10/21/2019    * SDH (subdural hematoma) [facility])  Assessment & Plan  Secondary to a fall 10/8  Patient was admitted to [facility] 10/8 through 10/10  She was evaluated by Neurosurgery who recommended conservative management of SDH  VTE Pharmacologic Prophylaxis:   Pharmacologic: Heparin    Current Length of Stay: 2 day(s)    Current Patient Status: Inpatient Rehab     Discharge Plan: As per treatment team    Code Status: Level 1 - Full Code    Subjective:   Patient was evaluated in the therapy gym today  She was observed transferring from chair to wheelchair with walker with minimal assistance from therapy staff  Patient denies any type of shortness of breath is just needed exertion associated with transition  Patient reports she slept well overnight    Denies any type of shortness of breath, dyspnea exertion, chest pain, palpitations, dizziness, lightheaded    Objective:     Vitals:   Temp (24hrs), Av 8 °F (36 6 °C), Min:97 2 °F (36 2 °C), Max:98 4 °F (36 9 °C)    Temp:  [97 2 °F (36 2 °C)-98 4 °F (36 9 °C)] 97 2 °F (36 2 °C)  HR:  [65-73] 65  Resp:  [18-20] 18  BP: (117-131)/(52-62) 117/52  SpO2:  [94 %-97 %] 94 %  Body mass index is 37 31 kg/m²  Review of Systems   Constitutional: Negative for appetite change, chills, fatigue, fever and unexpected weight change  Respiratory: Negative for cough, chest tightness, shortness of breath and wheezing  Cardiovascular: Negative for chest pain, palpitations and leg swelling  Gastrointestinal: Negative for abdominal pain, constipation, diarrhea, nausea and vomiting  Musculoskeletal: Positive for arthralgias (posterior shoulder) and back pain  Negative for myalgias  Neurological: Negative for dizziness, syncope, light-headedness and headaches         + restless leg   Psychiatric/Behavioral: Positive for sleep disturbance (related to restless leg)  Input and Output Summary (last 24 hours): Intake/Output Summary (Last 24 hours) at 10/30/2019 1039  Last data filed at 10/30/2019 0757  Gross per 24 hour   Intake 570 ml   Output --   Net 570 ml       Physical Exam:     Physical Exam   Constitutional: She is oriented to person, place, and time  She appears well-developed and well-nourished  No distress  Neck: No JVD present  Cardiovascular: Normal rate, regular rhythm and normal heart sounds  No murmur heard  Trace LE edema B/L  Compression stockings in place   Pulmonary/Chest: Effort normal and breath sounds normal  No respiratory distress  She has no wheezes  Neurological: She is alert and oriented to person, place, and time  No focal deficits   Skin: Skin is warm and dry  Psychiatric: She has a normal mood and affect  Her behavior is normal  Judgment and thought content normal    Nursing note and vitals reviewed      Additional Data:     Labs:    Results from last 7 days   Lab Units 10/30/19  0538   WBC Thousand/uL 5 20   HEMOGLOBIN g/dL 7 6*   HEMATOCRIT % 23 0*   PLATELETS Thousands/uL 197     Results from last 7 days   Lab Units 10/30/19  0538   SODIUM mmol/L 133*   POTASSIUM mmol/L 3 8   CHLORIDE mmol/L 89*   CO2 mmol/L 36*   BUN mg/dL 72*   CREATININE mg/dL 1 99*   ANION GAP mmol/L 8   CALCIUM mg/dL 8 9   GLUCOSE RANDOM mg/dL 102*         Results from last 7 days   Lab Units 10/27/19  0606 10/26/19  0556 10/25/19  2028 10/25/19  1538 10/25/19  1104 10/25/19  0554 10/24/19  2109 10/24/19  1555 10/24/19  1058 10/24/19  0538 10/23/19  2025 10/23/19  1536   POC GLUCOSE mg/dl 121 103 154* 130 123 107 127 144* 221* 163* 163* 155*             Labs reviewed    Imaging:  Imaging reviewed    Recent Cultures (last 7 days):           Last 24 Hours Medication List:     Current Facility-Administered Medications:  acetaminophen 650 mg Oral Q6H PRN Anton Shrestha MD   albuterol 2 puff Inhalation Q4H PRN Anton Shrestha MD   clopidogrel 75 mg Oral Daily Anton Shrestha MD   gabapentin 300 mg Oral TID Anton Shrestha MD   heparin (porcine) 5,000 Units Subcutaneous Q8H [address] Anton Wilcox MD   levothyroxine 112 mcg Oral Daily Anton Shrestha MD   lidocaine 3 patch Topical Daily PRN Anton Shrestha MD   methocarbamol 500 mg Oral Q6H PRN Anton Shrestha MD   metoprolol tartrate 12 5 mg Oral Q12H [address] Anton Shrestha MD   nystatin  Topical BID Anton Shrestha MD   pantoprazole 20 mg Oral Daily Before Breakfast Marina Griggs MD   polyethylene glycol 17 g Oral Daily PRN Anton Shrestha MD   sertraline 25 mg Oral HS Anton Shrestha MD   torsemide 20 mg Oral Daily Alyson Hamilton MD      M*Modal software was used to dictate this note  It may contain errors with dictating incorrect words or incorrect spelling  Please contact the provider directly with any questions

## 2019-10-30 NOTE — NURSING NOTE
Patient's hemoglobin down to 7 7 patient is now going to get one unit of blood today waiting from call from blood bank

## 2019-10-30 NOTE — PROGRESS NOTES
10/30/19 1230   Pain Assessment   Pain Assessment No/denies pain   Pain Score No Pain   Restrictions/Precautions   Precautions Fall Risk;Pain  (O2 b/t 97%-99% room air t/o tx session)   Putting On/Taking Off Footwear   Type of Assistance Needed Physical assistance   Amount of Physical Assistance Provided 50%-74%   Comment Pt utilized metal sick aid to don compression socks requiring modA  Pt reports she completes donning of sock/shoes EOB with feet up in bed  Pt able to use dressing stick to don sneakers  Pt able to utilize dressing stick to doff socks  Please continue to use LHAE for lB dressing  Putting On/Taking Off Footwear CARE Score 2   Sit to Stand   Type of Assistance Needed Incidental touching   Amount of Physical Assistance Provided Less than 25%   Comment CGA for boost only, requires extra time to complete   Sit to Stand CARE Score 3   Bed-Chair Transfer   Type of Assistance Needed Incidental touching   Amount of Physical Assistance Provided Less than 25%   Comment CGA with RW   Chair/Bed-to-Chair Transfer CARE Score 3   Transfer Bed/Chair/Wheelchair   Positioning Concerns Skin Integrity   Limitations Noted In Balance; Endurance   Adaptive Equipment Roller Walker   Kitchen Mobility   Kitchen-Mobility Level Walker   Kitchen Activity Retrieve items;Transport items   Kitchen Mobility Comments Pt requires CGA for functional mobility with RW and use of walker tray to transport items from regriegerator to w/c  Pt requiring vc t/o fpr safety  pt reporting daughter compeltes meal but pt needs to [employer] and trasport      Therapeutic Excerise-Strength   UE Strength Yes   Exercise Tools   Exercise Tools Yes   Other Exercise Tool 1 Pt engaged in BUE strengthening with 2lb weight 3x10 in all avaialble shoulder planes to increase functional strength to increase participation in functional transfers   Other Exercise Tool 2 Pt seated in w/c with 2lb dowel while tapping ball 10x30 to increase BUE strength and increase core strength and postural control for increased particiaption in functional tasks  Cognition   Overall Cognitive Status WFL   Arousal/Participation Alert; Cooperative   Attention Within functional limits   Orientation Level Oriented X4   Memory Decreased short term memory   Following Commands Follows multistep commands with increased time or repetition   Activity Tolerance   Activity Tolerance Patient tolerated treatment well   Assessment   Treatment Assessment Pt seen for 90mins of skilled OT services with emphasis on LB dressing only socks and shoes, BUE strengthening and IADL transportation of items only  Pt currenlty requires CGA to perform functional mobility with RW and walker tray for item transportation  Pts O2 stats monitired t/o 2* to pt reporting SOB during activities O2 stats 97%-99% t/o  Pt tolerated tx session well  Can continue to benefit from skille dOT services to increase activity tolerance, standing balance, safety to increase functional independence, increase safety to prevent falls and reduce readmission  Prognosis Good   Problem List Decreased strength;Decreased range of motion;Decreased endurance; Impaired balance;Decreased mobility   Plan   Treatment/Interventions ADL retraining;Functional transfer training; Therapeutic exercise; Endurance training;Patient/family training;Bed mobility; Compensatory technique education   Progress Progressing toward goals   OT Therapy Minutes   OT Time In 1230   OT Time Out 1400   OT Total Time (minutes) 90   OT Mode of treatment - Individual (minutes) 90   OT Mode of treatment - Concurrent (minutes) 0   OT Mode of treatment - Group (minutes) 0   OT Mode of treatment - Co-treat (minutes) 0   OT Mode of Treatment - Total time(minutes) 90 minutes   OT Cumulative Minutes 180   Therapy Time missed   Time missed?  No

## 2019-10-30 NOTE — ASSESSMENT & PLAN NOTE
Wt Readings from Last 3 Encounters:   10/30/19 83 8 kg (184 lb 11 9 oz)   10/28/19 76 kg (167 lb 8 8 oz)   10/27/19 81 9 kg (180 lb 8 9 oz)     Patient with acute on chronic diastolic CHF  She did require BiPAP, IV diuresis during her ICU stay  Patient did diurese well  Patient appears euvolemic today  She will continue her torsemide 20 mg daily  This was reduced secondary to worsening bicarb and renal function  Do need to continue to follow closely  Recommend daily weights with the same scale    Last echo was 10/21/2019  Showing moderate left ventricle systolic dysfunction with an EF 41%  Severe hypokinesis to akinesis in the distal half of the anterior, lateral and inferior walls with akinesis of the distal 3rd of the septum  The apex is akinetic to paradoxical  The  base of the heart contracts vigorously  Although these findings could be secondary to coronary artery disease, they also are consistent with Takotsubo's syndrome  Grade 2 left ventricle diastolic dysfunction  Moderate to severe MR  Calcified tricuspid aortic valve  Moderate TR  Mild NV    No signs or symptoms of fluid overload today

## 2019-10-30 NOTE — ASSESSMENT & PLAN NOTE
Bioprosthetic valve  Last echo was 10/21/2019  Showing moderate left ventricle systolic dysfunction with an EF 41%  Severe hypokinesis to akinesis in the distal half of the anterior, lateral and inferior walls with akinesis of the distal 3rd of the septum  The apex is akinetic to paradoxical  The  base of the heart contracts vigorously  Although these findings could be secondary to coronary artery disease, they also are consistent with Takotsubo's syndrome  Grade 2 left ventricle diastolic dysfunction  Moderate to severe MR  Calcified tricuspid aortic valve  Moderate TR    Mild RI

## 2019-10-30 NOTE — ASSESSMENT & PLAN NOTE
Results from last 7 days   Lab Units 11/06/19  0512 11/05/19  0642 11/04/19  0429   CREATININE mg/dL 1 68* 1 61* 1 66*     · Continue monitoring kidney function via intermittent BMP  · Avoid nephrotoxic meds/NSAIDs  · Nephrology team following, appreciate recs

## 2019-10-30 NOTE — ASSESSMENT & PLAN NOTE
Results from last 7 days   Lab Units 11/06/19  0512 11/05/19  0642 11/04/19  0429   SODIUM mmol/L 131* 133* 134*     · Monitor BMP intermittently  · Continue 1200ml fluid restriction until stated otherwise by PCP/nephrologist

## 2019-10-30 NOTE — ASSESSMENT & PLAN NOTE
Secondary to a fall 10/8  Patient was admitted to [facility] 10/8 through 10/10  She was evaluated by Neurosurgery who recommended conservative management of SDH

## 2019-10-30 NOTE — PROGRESS NOTES
Physical Medicine and Rehabilitation Progress Note  Eri Marie 80 y o  female MRN: [id number]  Unit/Bed#: [facility] 669-62 Encounter: [id number]    HPI: Eri Marie is a 80 y o  female who presented to the [address] [place] [address] with generalized weakness  Patient formally been admitted from October 8th and discharged home on October 10th after fall at home  At the time workup was significant for subdural hematoma  Conservative management was recommended that time  Patient then re-presented on 10/12/19 with weakness and gait instability  During the 2nd admission, patient needed up with acute respiratory failure, requiring admission to ICU  Pulmonary function eventually did improve to the point where patient was able to be discharged to the [facility] on 10/19/19  Unfortunately patient's respiratory function decline, patient found to be in significant respiratory distress on October 21st, necessitated discharge back to acute hospital   Patient was found to have an elevated BNP of 69964  In addition imaging was positive for evidence of overload  Patient was diuresed with torsemide  Eventually pulmonary function improved to the point where patient could return to the [facility]  She was admitted to [facility] on 10/28/19  Chief Complaint: f/u TBI and f/u ambulatory dysfunction    Interval: No acute events overnight  Patient without complaint  Patient may require transfusion, is agreeable if needed  Consent to be signed  ROS: A 10 point ROS was performed; negative except as noted above  Assessment/Plan:      * SDH (subdural hematoma) ([facility])  Assessment & Plan  · Conservative management was recommended  · Patient with a right subdural hematoma  · Discussed with neurosurgery, cleared for DVT prophylaxis, and plavix  Per service, bleed looks to have resolved  No need for repeat CTOH unless neurologic decline noted       CKD (chronic kidney disease) stage 3, GFR 30-59 ml/min ([facility])  Assessment & Plan  Results from last 7 days   Lab Units 10/30/19  0538 10/29/19  0525 10/28/19  0433   CREATININE mg/dL 1 99* 2 08* 1 95*     · Continue monitoring kidney function via intermittent BMP  · Avoid nephrotoxic meds/NSAIDs  · Nephrology team following, sandy casillas    Renal artery stenosis [facility])  Assessment & Plan  · Nephrology service is following  · Patient may require repeat ultrasound with pain medications    AZUL (obstructive sleep apnea)  Assessment & Plan  · Continue BiPAP at night  · Will consult pulmonology closer to discharge to evaluate for DME needs for BiPAP    Chronic anemia  Assessment & Plan  Results from last 7 days   Lab Units 10/30/19  0538   HEMOGLOBIN g/dL 7 6*     · Monitor CBC intermittently   · Transfuse for Hgb <7  · Repeat CBC at 7 6  · Consent to be signed    Hyponatremia  Assessment & Plan  Results from last 7 days   Lab Units 10/30/19  0538 10/29/19  0525 10/28/19  0433   SODIUM mmol/L 133* 134* 135*     · Monitor BMP intermittently    Depression  Assessment & Plan  · Continue sertraline    Acute on chronic diastolic heart failure (HCC)  Assessment & Plan  Wt Readings from Last 3 Encounters:   10/30/19 83 8 kg (184 lb 11 9 oz)   10/28/19 76 kg (167 lb 8 8 oz)   10/27/19 81 9 kg (180 lb 8 9 oz)       · Monitor daily weights  · Patient now on torsemide 20 mg daily      Essential hypertension  Assessment & Plan  Temp:  [97 2 °F (36 2 °C)-98 4 °F (36 9 °C)] 97 2 °F (36 2 °C)  HR:  [65-73] 65  Resp:  [18-20] 18  BP: (117-131)/(52-62) 117/52    · Continue Metoprolol   · IM service managing anti-hypertensives, adjusting as needed    H/O: CVA (cerebrovascular accident)  Assessment & Plan  · Plavix was on hold due to bleed  · Discussed with neurosurgery  Patient now cleared to resume       Hypothyroidism  Assessment & Plan  · Continue Synthroid    # Skin  · Encourage regular turning as patient at risk for skin breakdown  · Staff to continue patient education on Q2h turning  · Rehabilitation team to perform skin checks regularly     # Bowel  · Patient reports no constipation  · To ensure regular BMs, bowel regimen consisting of:  colace , dulcolax suppository  and miralax     # Bladder  · Patient voiding spontaneously    # Pain  · Continue tylenol, for max of 3gm daily  # Rehab Psych   · There are no psychological or psychiatric problems identified    # Other  - Diet/Nutrition:        Diet Orders   (From admission, onward)             Start     Ordered    10/28/19 1323  Diet Cardiovascular; Cardiac; Fluid Restriction 1200 ML, Consistent Carbohydrate Diet Level 2 (5 carb servings/75 grams CHO/meal)  Diet effective now     Question Answer Comment   Diet Type Cardiovascular    Cardiac Cardiac    Other Restriction(s): Fluid Restriction 1200 ML    Other Restriction(s): Consistent Carbohydrate Diet Level 2 (5 carb servings/75 grams CHO/meal)    RD to adjust diet per protocol?  Yes        10/28/19 1323              - DVT prophy: Sequential compression device (Venodyne)  and Heparin  - GI ppx: Pantaprazole  - Nausea: None  - Supplements: None  - Sleep: None    Disposition: TBD    CODE: Level 1: Full Code Scheduled Meds:    Current Facility-Administered Medications:  acetaminophen 650 mg Oral Q6H PRN Anton Shrestha MD   albuterol 2 puff Inhalation Q4H PRN Edin Guo MD   clopidogrel 75 mg Oral Daily Anton Shrestha MD   gabapentin 300 mg Oral TID Anton Shrestha MD   heparin (porcine) 5,000 Units Subcutaneous Q8H [address] Anton Shrestha MD   levothyroxine 112 mcg Oral Daily Anton Shrestha MD   lidocaine 3 patch Topical Daily PRN Anton Shrestha MD   methocarbamol 500 mg Oral Q6H PRN Anton Shrestha MD   metoprolol tartrate 12 5 mg Oral Q12H [address] Anton Shrestha MD   nystatin  Topical BID Anton Shrestha MD   pantoprazole 20 mg Oral Daily Before Breakfast Wilmer Nielsen MD   polyethylene glycol 17 g Oral Daily PRN Anton Shrestha MD   sertraline 25 mg Oral HS Anton Shrestha MD   torsemide 20 mg Oral Daily Anton Shrestha MD        Objective:    Functional Update:  Mobility: CGA with RW  Transfers: mod assist  ADLs: max lowers    Allergies per EMR    Physical Exam:  Temp:  [97 2 °F (36 2 °C)-98 4 °F (36 9 °C)] 97 2 °F (36 2 °C)  HR:  [65-73] 65  Resp:  [18-20] 18  BP: (117-131)/(52-62) 117/52  SpO2:  [94 %-97 %] 94 %    General: alert, no apparent distress, cooperative and comfortable  HEENT:  Head: Normocephalic, no lesions, without obvious abnormality  LUNGS:  no abnormal respiratory pattern, no retractions noted, non-labored breathing   ABDOMEN:  soft, non-tender  Bowel sounds normal  No masses, no organomegaly  EXTREMITIES:  extremities normal, warm and well-perfused; no cyanosis, clubbing, or edema  NEURO:   mental status, speech normal, alert and oriented x3  PSYCH:  Alert and oriented, appropriate affect  Physical examination is otherwise unchanged from previous encounter, except as noted above  Diagnostic Studies: Reviewed, no new imaging  No orders to display     Laboratory: Reviewed   Results from last 7 days   Lab Units 10/30/19  0538   HEMOGLOBIN g/dL 7 6*   HEMATOCRIT % 23 0*   WBC Thousand/uL 5 20     Results from last 7 days   Lab Units 10/30/19  0538 10/29/19  0525 10/28/19  0433   BUN mg/dL 72* 72* 63*   SODIUM mmol/L 133* 134* 135*   POTASSIUM mmol/L 3 8 3 8 3 7   CHLORIDE mmol/L 89* 89* 90*   CREATININE mg/dL 1 99* 2 08* 1 95*            ** Please Note: Fluency Direct voice to text software may have been used in the creation of this document   **

## 2019-10-30 NOTE — ASSESSMENT & PLAN NOTE
Lab Results   Component Value Date    HGBA1C 5 0 08/16/2019       No results for input(s): POCGLU in the last 72 hours  Blood Sugar Average: Last 72 hrs:      blood sugars were well controlled during her hospitalization  Her last A1c was 5 0  She did not require any insulin during hospitalization    Recommend diabetic diet

## 2019-10-30 NOTE — ASSESSMENT & PLAN NOTE
Reviewed Nephrology consult  Patient with a baseline creatinine of 1 5-1 6  Her torsemide was reduced secondary to increasing renal function  She does need to have been repeat renal artery ultrasound secondary to history of renal artery stenosis  It was attempted to be completed during his recent hospitalization but was unable to be secondary to discomfort  Creatinine has slightly decreased    1 95--> 2 08 --> 1 99

## 2019-10-30 NOTE — PCC OCCUPATIONAL THERAPY
Pt is making steady gains towards STG/LTG  Pt close to previous baseline  Pt has increased family support at home, pt reports [employer] friends come to stay with her days daughter works  In addition, she reports at times grandchildren will A with socks and shoes  Pt currently perform bathing at [address]* to unable to reach feet, UB dressing at [address], TA for LB dressing  Pt currently requires CGA to perform functional ADL transfer with RW  Pt can continue to benefit from skilled OT services to increase activity tolerance, dynamic standing balance, increase participation in LB Dressing with use of LHAE to increase independence and decrease caregiver burden through interventions including self-care, there act, there ex

## 2019-10-30 NOTE — ASSESSMENT & PLAN NOTE
Reviewed nephrology consult and recommend transfuse if patient's hemoglobin is less than eight  Her hemoglobin did drop from 8 1-->7 6  Discussed with patient that was the recommendation to transfuse  Patient has had transfusions in the past and has no objections  However she does wish to speak with renal prior to proceeding forward  Patient is hesitant as she feels there are "too many doctors" and wants to make sure everyone is on the same page    Iron panel and folate reviewed from 10/21 within normal limits

## 2019-10-31 LAB
ABO GROUP BLD BPU: NORMAL
ANION GAP SERPL CALCULATED.3IONS-SCNC: 9 MMOL/L (ref 5–14)
BPU ID: NORMAL
BUN SERPL-MCNC: 68 MG/DL (ref 5–25)
CALCIUM SERPL-MCNC: 9 MG/DL (ref 8.4–10.2)
CHLORIDE SERPL-SCNC: 90 MMOL/L (ref 97–108)
CO2 SERPL-SCNC: 35 MMOL/L (ref 22–30)
CREAT SERPL-MCNC: 1.84 MG/DL (ref 0.6–1.2)
CROSSMATCH: NORMAL
ERYTHROCYTE [DISTWIDTH] IN BLOOD BY AUTOMATED COUNT: 16.5 %
GFR SERPL CREATININE-BSD FRML MDRD: 25 ML/MIN/1.73SQ M
GLUCOSE SERPL-MCNC: 99 MG/DL (ref 70–99)
HCT VFR BLD AUTO: 27.4 % (ref 36–46)
HGB BLD-MCNC: 9.3 G/DL (ref 12–16)
MCH RBC QN AUTO: 33.4 PG (ref 26.8–34.3)
MCHC RBC AUTO-ENTMCNC: 33.8 G/DL (ref 31.4–37.4)
MCV RBC AUTO: 99 FL (ref 80–100)
PLATELET # BLD AUTO: 205 THOUSANDS/UL (ref 150–450)
PMV BLD AUTO: 7.3 FL (ref 8.9–12.7)
POTASSIUM SERPL-SCNC: 3.9 MMOL/L (ref 3.6–5)
RBC # BLD AUTO: 2.78 MILLION/UL (ref 4–5.2)
SODIUM SERPL-SCNC: 134 MMOL/L (ref 137–147)
UNIT DISPENSE STATUS: NORMAL
UNIT PRODUCT CODE: NORMAL
UNIT RH: NORMAL
WBC # BLD AUTO: 5.9 THOUSAND/UL (ref 4.31–10.16)

## 2019-10-31 PROCEDURE — 85027 COMPLETE CBC AUTOMATED: CPT | Performed by: PHYSICAL MEDICINE & REHABILITATION

## 2019-10-31 PROCEDURE — 99232 SBSQ HOSP IP/OBS MODERATE 35: CPT | Performed by: NURSE PRACTITIONER

## 2019-10-31 PROCEDURE — 80048 BASIC METABOLIC PNL TOTAL CA: CPT | Performed by: PHYSICAL MEDICINE & REHABILITATION

## 2019-10-31 PROCEDURE — 97110 THERAPEUTIC EXERCISES: CPT

## 2019-10-31 PROCEDURE — 99233 SBSQ HOSP IP/OBS HIGH 50: CPT | Performed by: PHYSICAL MEDICINE & REHABILITATION

## 2019-10-31 PROCEDURE — 97530 THERAPEUTIC ACTIVITIES: CPT

## 2019-10-31 PROCEDURE — 97116 GAIT TRAINING THERAPY: CPT

## 2019-10-31 PROCEDURE — 97535 SELF CARE MNGMENT TRAINING: CPT

## 2019-10-31 RX ORDER — TROLAMINE SALICYLATE 10 G/100G
1 CREAM TOPICAL 4 TIMES DAILY
Status: DISCONTINUED | OUTPATIENT
Start: 2019-10-31 | End: 2019-11-06 | Stop reason: HOSPADM

## 2019-10-31 RX ORDER — TORSEMIDE 20 MG/1
30 TABLET ORAL DAILY
Status: DISCONTINUED | OUTPATIENT
Start: 2019-11-01 | End: 2019-11-01

## 2019-10-31 RX ADMIN — METHOCARBAMOL 500 MG: 500 TABLET ORAL at 20:54

## 2019-10-31 RX ADMIN — HEPARIN SODIUM 5000 UNITS: 5000 INJECTION INTRAVENOUS; SUBCUTANEOUS at 06:38

## 2019-10-31 RX ADMIN — GABAPENTIN 300 MG: 300 CAPSULE ORAL at 15:30

## 2019-10-31 RX ADMIN — SERTRALINE 25 MG: 25 TABLET, FILM COATED ORAL at 20:54

## 2019-10-31 RX ADMIN — TROLAMINE SALICYLATE 1 APPLICATION: 10 CREAM TOPICAL at 20:56

## 2019-10-31 RX ADMIN — ALBUTEROL SULFATE 2 PUFF: 90 AEROSOL, METERED RESPIRATORY (INHALATION) at 21:16

## 2019-10-31 RX ADMIN — TORSEMIDE 20 MG: 20 TABLET ORAL at 09:00

## 2019-10-31 RX ADMIN — GABAPENTIN 300 MG: 300 CAPSULE ORAL at 20:53

## 2019-10-31 RX ADMIN — METOPROLOL TARTRATE 12.5 MG: 25 TABLET ORAL at 20:54

## 2019-10-31 RX ADMIN — PANTOPRAZOLE 20 MG: 20 TABLET, DELAYED RELEASE ORAL at 09:03

## 2019-10-31 RX ADMIN — LIDOCAINE 2 PATCH: 50 PATCH CUTANEOUS at 10:30

## 2019-10-31 RX ADMIN — NYSTATIN: 100000 POWDER TOPICAL at 18:40

## 2019-10-31 RX ADMIN — METOPROLOL TARTRATE 12.5 MG: 25 TABLET ORAL at 09:00

## 2019-10-31 RX ADMIN — HEPARIN SODIUM 5000 UNITS: 5000 INJECTION INTRAVENOUS; SUBCUTANEOUS at 13:04

## 2019-10-31 RX ADMIN — METHOCARBAMOL 500 MG: 500 TABLET ORAL at 09:03

## 2019-10-31 RX ADMIN — GABAPENTIN 300 MG: 300 CAPSULE ORAL at 09:00

## 2019-10-31 RX ADMIN — ACETAMINOPHEN 650 MG: 325 TABLET ORAL at 20:53

## 2019-10-31 RX ADMIN — CLOPIDOGREL BISULFATE 75 MG: 75 TABLET, FILM COATED ORAL at 09:00

## 2019-10-31 RX ADMIN — ALBUTEROL SULFATE 2 PUFF: 90 AEROSOL, METERED RESPIRATORY (INHALATION) at 16:35

## 2019-10-31 RX ADMIN — LEVOTHYROXINE SODIUM 112 MCG: 112 TABLET ORAL at 06:35

## 2019-10-31 RX ADMIN — ACETAMINOPHEN 650 MG: 325 TABLET ORAL at 09:03

## 2019-10-31 NOTE — TEAM CONFERENCE
Acute RehabilitationTe Conference Note  Date: 10/31/2019   Time: 12:46 PM       Patient Name:  Enoc Fletcher       Medical Record Number: [id number]   YOB: 1934  Sex: Female          Room/Bed:  [facility] 268/[facility] 268-02  Payor Info:  Payor: [address] [place] [address] / Plan: [facility] REP / Product Type: Medicare HMO /      Admitting Diagnosis: CHF (congestive heart failure) ([facility] ) [I50 9]  Weakness [R53 1]   Admit Date/Time:  10/28/2019  1:22 PM  Admission Comments: No comment available     Primary Diagnosis:  SDH (subdural hematoma) ([facility])  Principal Problem: SDH (subdural hematoma) ([facility] )    Patient Active Problem List    Diagnosis Date Noted    CKD (chronic kidney disease) stage 3, GFR 30-59 ml/min ([facility] ) 10/29/2019    Alkalosis 10/29/2019    Acute on chronic respiratory failure with hypoxia ([facility] ) 10/21/2019    Type 2 MI (myocardial infarction) ([facility] ) 10/21/2019    Chronic anemia 10/19/2019    SDH (subdural hematoma) ([facility] ) 10/19/2019    AZUL (obstructive sleep apnea) 10/19/2019    Acute-on-chronic kidney injury ([facility] ) 08/16/2019    H/O spinal fusion 07/23/2019    Hyponatremia 01/21/2019    Acute on chronic diastolic heart failure ([facility] ) 01/17/2019    Depression 01/17/2019    Essential hypertension 01/09/2019    Controlled type 2 diabetes mellitus with diabetic neuropathy, without long-term current use of insulin ([facility] ) 10/24/2017    SLE (systemic lupus erythematosus) ([facility] ) 10/24/2017    Vitamin D deficiency 08/22/2017    Renal artery stenosis ([facility] ) 02/08/2017    Peripheral polyneuropathy 02/07/2017    Hypothyroidism 09/23/2016    H/O: CVA (cerebrovascular accident) 09/23/2016    History of malignant neoplasm of breast 09/23/2016    History of aortic valve replacement 07/21/2015    RA (rheumatoid arthritis) ([facility] ) 07/21/2015    Hyperlipidemia 10/01/2013    Osteoporosis 10/01/2013    Morbid obesity ([facility] ) 10/12/2012       Physical Therapy:    Weight Bearing Status: Full Weight Bearing  Transfers: Incidental Touching, Minimal Assistance  Bed Mobility: Minimal Assistance  Amulation Distance (ft): 60 feet  Ambulation: Incidental Touching  Assistive Device for Ambulation: Roller Walker  Ramp: Incidental Touching  Assistive Device for Ramp: Roller Walker  Discharge Recommendations: Home with:  [address] with[de-identified] Family Support, Home Physical Therapy, Outpatient Physical Therapy    10/30/2019: Pt presents with gait instability and limited act tolerance follwing re-admission to the [facility]  Physical impairments include: dec strength, balance, righting reactions, act tolerance and functional mobility  Pt household ambulation only PTA with family using transport chair for longer community distances  Pt lives in apartment attached to dtr and [place] house with ramp to enter and first floor set-up  SpO2 holding steady with therapy and activity 94% or above on RA  BLE weakness and BUE chronic shoulder pain making STS transfers from lower surfaces challenging  Pt states she has lift chair at home which she sleeps  D/c plan and recommendations TBD pending pt progress  Occupational Therapy:  Eating: Independent  Grooming: Supervision  Bathing: Maximum Assistance  Bathing: Maximum Assistance  Upper Body Dressing: Moderate Assistance  Lower Body Dressing: Maximum Assistance  Toileting: Total Assistance  Tub/Shower Transfer: Moderate Assistance  Toilet Transfer: Moderate Assistance  Cognition: Within Defined Limits  Orientation: Person, Time, Situation, Place  Discharge Recommendations: Home with:  Fatimah Gómez with[de-identified] Family Support       Pt is making steady gains towards STG/LTG  Pt close to previous baseline  Pt has increased family support at home, pt reports [employer] friends come to stay with her days daughter works  In addition, she reports at times grandchildren will A with socks and shoes  Pt currently perform bathing at [address]* to unable to reach feet, UB dressing at [address], TA for LB dressing   Pt currently requires CGA to perform functional ADL transfer with RW  Pt can continue to benefit from skilled OT services to increase activity tolerance, dynamic standing balance, increase participation in LB Dressing with use of LHAE to increase independence and decrease caregiver burden through interventions including self-care, there act, there ex  Speech Therapy:           No notes on file    Nursing Notes:  Appetite: Fair  Diet Type: Cardiac, Diabetic                      Diet Patient/Family Education Complete: Yes    Type of Wound (LDA): Wound                       Bladder: 3 - Moderate Assistance     Bladder Patient/Family Education: Yes  Bowel: 3 - Moderate Assistance     Bowel Patient/Family Education: Yes  Pain Location: Shoulder  Pain Orientation: Bilateral  Pain Score: 6                       Hospital Pain Intervention(s): Medication (See MAR), Repositioned, Ambulation/increased activity  Pain Patient/Family Education: Yes  Medication Management/Safety  Injectable: (Heparin sq)  Safe Administration: Yes    Pt  Admitted S/P fall on October 8th which she sustained a SDH which was being treated conservatively  Pt discharged  Home on October 10th and readmitted to hospital on October 12th with gait instability and weakness  Pt  Had acute respiratory distress that admission and was in ICU  Patient's condition improved and was initially admitted to the [facility] on 10/19/19  Pt  Again was having pulmonary issues with an increased BNP and was transferred to the acute side of the hospital on 10/21  Pt  Admitted back to [facility] on 10/28  Pt has history of hypothyroidism, CVA, HTN, CHF, depression, aortic valve replacement, CKD stage 3, renal artery stent, DM, hyperlipidemia, neuropathy, arthritis, fibromyalgia, compression fracture of thoracic verebra, cervical fracture with fixation, obstructive sleep apnea, breast CA with lumpectomy, and left total knee replacement     Pt  Is taking Tylenol 650 mg prn for pain control as well as Lidoderm patches to bilateral shoulders for same  Pt  Has albuterol inhaler puffs prn for SOB  Pt  Recently started on Plavix 75 mg  Pt  Wears venodynes and receives Heparin injections for anticoagulation therapy  Pt  Takes Levothyroxine 112 mcg tablet for hypothyroidism  Robaxin 500 mg given prn for muscle spasms in neck and shoulders  Pt  Takes Lopressor 12 5 mg BID for cardiac issues including HTN  Nystatin powder applied to areas of excoriation under breasts, groin and abdominal folds  Pt  Is also taking Zoloft 25 mg daily at bedtime for depression and Demadex 20 mg daily for hx  Of CHF  Pt  Is on a cardiac, CCD diet with a fluid restriction of 1,200 cc  Pt  Has a double lumen PICC in the left arm for blood draws because she is a difficult stick  Pt  Had an overnight pulse ox study performed to determine whether she needs to wear her Bipap at home  Pt  Is mod assist with rolling walker and max assist with bed mobility  This week we will monitor vital signs and labs  Daily weight will also be monitored daily on same scale for consistency  Pain will be managed with Tylenol, Robaxin and Lidoderm patches  We will prevent skin breakdown by turning and repositioning patient and off loading pressure  Pt 's sacrum is pink, but blanchable  Calazime ointment applied  Right abdominal fold excoriation with mild bleeding on and off  Area cleansed with NSS and ABD applied to absorb moisture  We will encourage independence with ADLs and keep patient safe from falls  Case Management:     Discharge Planning  Living Arrangements: Children  Support Systems: Children, [employer] community  Assistance Needed: yes  Type of Current Residence: Private residence  Current Home Care Services: No  Pt is participating well with therapy, and plans on a return home  Pts family is very supportive, and her daughters home is connected to her own  Pt has been educated on the potential for cont'd care, ie therapy and services  Following to assist with d/c planning needs  Is the patient actively participating in therapies? yes  List any modifications to the treatment plan:     Barriers Interventions   Decreased balance/righting reactions Therapy exercises/cueing   Decreased activity tolerance Energy conservation education   Decreased strength Therapy exercises/strengthening exercises   Difficulty breathing oxygen         Is the patient making expected progress toward goals? Yes  List any update or changes to goals:     Medical Goals: Patient will be medically stable for discharge to [facility] upon completion of rehab program and Patient will be able to manage medical conditions and comorbid conditions with medications and follow up upon completion of rehab program    Weekly Team Goals:   Rehab Team Goals  ADL Team Goal: Patient will require assist with ADLs with least restrictive device upon completion of rehab program  Transfer Team Goal: Patient will be independent with transfers with least restrictive device upon completion of rehab program  Locomotion Team Goal: Patient will be independent with locomotion with least restrictive device upon completion of rehab program    Discussion: Pt presents with the above barriers  Pt is currently incidental touching/min a for transfers, min a for bed mobility  Pt is ambulating 60ft w/incidental touching, and the use of RW  For ADLs Pt is max a for bathing/lower body dressing, mod a for upper body dressing/transfers, and total a for toileting  Pt has support from her family, as well as her [place] community  Pt goals are 24/7 supervision, due to safety concerns re: toileting/transfers  Family has been made aware, and will make the necessary adjustments  Pt was already connected with [facility], so CM will contact them to resume services for RN/PT/OT  Anticipated Discharge Date:  11 2 19  [facility] Team Members Present: The following team members are supervising care for this patient and were present during this Weekly Team Conference      Physician: Dr Monica Frank MD  : Chuckie Jimenez, BOB/ LCSW  Registered Nurse: Berta Mills RN   Physical Therapist: TREVER MathiasT  Occupational Therapist: Wendy Salas MS, OTR/L  Speech Therapist:   Other: Álvaro Cee PCM

## 2019-10-31 NOTE — PLAN OF CARE
Problem: CARDIOVASCULAR - ADULT  Goal: Maintains optimal cardiac output and hemodynamic stability  Description  INTERVENTIONS:  - Monitor I/O, vital signs and rhythm  - Monitor for S/S and trends of decreased cardiac output  - Administer and titrate ordered vasoactive medications to optimize hemodynamic stability  - Assess quality of pulses, skin color and temperature  - Assess for signs of decreased coronary artery perfusion  - Instruct patient to report change in severity of symptoms  Outcome: Progressing  Goal: Absence of cardiac dysrhythmias or at baseline rhythm  Description  INTERVENTIONS:  - Continuous cardiac monitoring, vital signs, obtain 12 lead EKG if ordered  - Administer antiarrhythmic and heart rate control medications as ordered  - Monitor electrolytes and administer replacement therapy as ordered  Outcome: Progressing     Problem: RESPIRATORY - ADULT  Goal: Achieves optimal ventilation and oxygenation  Description  INTERVENTIONS:  - Assess for changes in respiratory status  - Assess for changes in mentation and behavior  - Position to facilitate oxygenation and minimize respiratory effort  - Oxygen administered by appropriate delivery if ordered  - Initiate smoking cessation education as indicated  - Encourage broncho-pulmonary hygiene including cough, deep breathe, Incentive Spirometry  - Assess the need for suctioning and aspirate as needed  - Assess and instruct to report SOB or any respiratory difficulty  - Respiratory Therapy support as indicated  Outcome: Progressing     Problem: GASTROINTESTINAL - ADULT  Goal: Maintains adequate nutritional intake  Description  INTERVENTIONS:  - Monitor percentage of each meal consumed  - Identify factors contributing to decreased intake, treat as appropriate  - Assist with meals as needed  - Monitor I&O, weight, and lab values if indicated  - Obtain nutrition services referral as needed  Outcome: Progressing     Problem: METABOLIC, FLUID AND ELECTROLYTES - ADULT  Goal: Electrolytes maintained within normal limits  Description  INTERVENTIONS:  - Monitor labs and assess patient for signs and symptoms of electrolyte imbalances  - Administer electrolyte replacement as ordered  - Monitor response to electrolyte replacements, including repeat lab results as appropriate  - Instruct patient on fluid and nutrition as appropriate  Outcome: Progressing  Goal: Fluid balance maintained  Description  INTERVENTIONS:  - Monitor labs   - Monitor I/O and WT  - Instruct patient on fluid and nutrition as appropriate  - Assess for signs & symptoms of volume excess or deficit  Outcome: Progressing  Goal: Glucose maintained within target range  Description  INTERVENTIONS:  - Monitor Blood Glucose as ordered  - Assess for signs and symptoms of hyperglycemia and hypoglycemia  - Administer ordered medications to maintain glucose within target range  - Assess nutritional intake and initiate nutrition service referral as needed  Outcome: Progressing     Problem: SKIN/TISSUE INTEGRITY - ADULT  Goal: Skin integrity remains intact  Description  INTERVENTIONS  - Identify patients at risk for skin breakdown  - Assess and monitor skin integrity  - Assess and monitor nutrition and hydration status  - Monitor labs (i e  albumin)  - Assess for incontinence   - Turn and reposition patient  - Assist with mobility/ambulation  - Relieve pressure over bony prominences  - Avoid friction and shearing  - Provide appropriate hygiene as needed including keeping skin clean and dry  - Evaluate need for skin moisturizer/barrier cream  - Collaborate with interdisciplinary team (i e  Nutrition, Rehabilitation, etc )   - Patient/family teaching  Outcome: Progressing  Goal: Oral mucous membranes remain intact  Description  INTERVENTIONS  - Assess oral mucosa and hygiene practices  - Implement preventative oral hygiene regimen  - Implement oral medicated treatments as ordered  - Initiate Nutrition services referral as needed  Outcome: Progressing     Problem: MUSCULOSKELETAL - ADULT  Goal: Maintain or return mobility to safest level of function  Description  INTERVENTIONS:  - Assess patient's ability to carry out ADLs; assess patient's baseline for ADL function and identify physical deficits which impact ability to perform ADLs (bathing, care of mouth/teeth, toileting, grooming, dressing, etc )  - Assess/evaluate cause of self-care deficits   - Assess range of motion  - Assess patient's mobility  - Assess patient's need for assistive devices and provide as appropriate  - Encourage maximum independence but intervene and supervise when necessary  - Involve family in performance of ADLs  - Assess for home care needs following discharge   - Consider OT consult to assist with ADL evaluation and planning for discharge  - Provide patient education as appropriate  Outcome: Progressing  Goal: Maintain proper alignment of affected body part  Description  INTERVENTIONS:  - Support, maintain and protect limb and body alignment  - Provide patient/ family with appropriate education  Outcome: Progressing     Problem: COPING  Goal: Pt/Family able to verbalize concerns and demonstrate effective coping strategies  Description  INTERVENTIONS:  - Assist patient/family to identify coping skills, available support systems and cultural and spiritual values  - Provide emotional support, including active listening and acknowledgement of concerns of patient and caregivers  - Reduce environmental stimuli, as able  - Provide patient education  - Assess for spiritual pain/suffering and initiate spiritual care, including notification of Pastoral Care or [place]ianka based community as needed  - Assess effectiveness of coping strategies  Outcome: Progressing  Goal: Will report anxiety at manageable levels  Description  INTERVENTIONS:  - Administer medication as ordered  - Teach and encourage coping skills  - Provide emotional support  - Assess patient/family for anxiety and ability to cope  Outcome: Progressing     Problem: Prexisting or High Potential for Compromised Skin Integrity  Goal: Skin integrity is maintained or improved  Description  INTERVENTIONS:  - Identify patients at risk for skin breakdown  - Assess and monitor skin integrity  - Assess and monitor nutrition and hydration status  - Monitor labs   - Assess for incontinence   - Turn and reposition patient  - Assist with mobility/ambulation  - Relieve pressure over bony prominences  - Avoid friction and shearing  - Provide appropriate hygiene as needed including keeping skin clean and dry  - Evaluate need for skin moisturizer/barrier cream  - Collaborate with interdisciplinary team   - Patient/family teaching  - Consider wound care consult   Outcome: Progressing     Problem: Potential for Falls  Goal: Patient will remain free of falls  Description  INTERVENTIONS:  - Assess patient frequently for physical needs  -  Identify cognitive and physical deficits and behaviors that affect risk of falls    -  [place] fall precautions as indicated by assessment   - Educate patient/family on patient safety including physical limitations  - Instruct patient to call for assistance with activity based on assessment  - Modify environment to reduce risk of injury  - Consider OT/PT consult to assist with strengthening/mobility  Outcome: Progressing     Problem: PAIN - ADULT  Goal: Verbalizes/displays adequate comfort level or baseline comfort level  Description  Interventions:  - Encourage patient to monitor pain and request assistance  - Assess pain using appropriate pain scale  - Administer analgesics based on type and severity of pain and evaluate response  - Implement non-pharmacological measures as appropriate and evaluate response  - Consider cultural and social influences on pain and pain management  - Notify physician/advanced practitioner if interventions unsuccessful or patient reports new pain  Outcome: Progressing

## 2019-10-31 NOTE — PCC CARE MANAGEMENT
Pt is participating well with therapy, and plans on a return home  Pts family is very supportive, and her daughters home is connected to her own  Pt has been educated on the potential for cont'd care, ie therapy and services  Following to assist with d/c planning needs

## 2019-10-31 NOTE — ASSESSMENT & PLAN NOTE
Bioprosthetic valve  Last echo was 10/21/2019  Showing moderate left ventricle systolic dysfunction with an EF 41%  Severe hypokinesis to akinesis in the distal half of the anterior, lateral and inferior walls with akinesis of the distal 3rd of the septum  The apex is akinetic to paradoxical  The  base of the heart contracts vigorously  Although these findings could be secondary to coronary artery disease, they also are consistent with Takotsubo's syndrome  Grade 2 left ventricle diastolic dysfunction  Moderate to severe MR  Calcified tricuspid aortic valve  Moderate TR    [place]

## 2019-10-31 NOTE — PROGRESS NOTES
10/31/19 0700   Pain Assessment   Pain Assessment No/denies pain   Pain Score No Pain   Restrictions/Precautions   Precautions Fall Risk  (monitor O2 levels)   Eating   Type of Assistance Needed Independent   Amount of Physical Assistance Provided No physical assistance   Eating CARE Score 6   Oral Hygiene   Type of Assistance Needed Set-up / clean-up   Amount of Physical Assistance Provided No physical assistance   Comment Pt seated in w/c in front of sink  Pt able to compelte oral hygiene and mngmnt of dentures   Oral Hygiene CARE Score 5   Grooming   Able To Initiate Tasks;Comb/Brush Hair;Wash/Dry Face;Brush/Clean Teeth;Wash/Dry Hands   Limitation Noted In Timeliness   Findings Seated in w/c   Shower/Bathe Self   Type of Assistance Needed Physical assistance   Amount of Physical Assistance Provided 25%-49%   Comment Therapist completed sponge bath on this day 2* to no avaialbility of shower glove to cover L PICC line  Pt able to wash 7/10 parts  Pt requires A to wash below knees 2* to limited forward flexion resulting in SOB  In addition pt TA for buttock, pt requires CGA to maintian balance shile in stance to wash loreto area   Shower/Bathe Self CARE Score 3   Bathing   Assessed Bath Style Sponge Bath   Anticipated D/C Bath Style Shower   Able to [place] No   Able to [employer] Temperature No   Able to Wash/Rinse/Dry (body part) Left Arm;Right Arm;L Upper Leg;R Upper Leg;Chest;Abdomen;Perineal Area   Limitations Noted in Balance; Endurance;Strength;Timeliness   Positioning Seated;Standing   Tub/Shower Transfer   Reason Not Assessed Medical  (no shower glove to cover PICC line available)   Upper Body Dressing   Type of Assistance Needed Supervision   Amount of Physical Assistance Provided No physical assistance   Comment Pt able to don micah shirt and pullover sweater w/o physical A   However, requiring extra time to complete   Upper Body Dressing CARE Score 4   Lower Body Dressing   Type of Assistance Needed Physical assistance; Adaptive equipment   Amount of Physical Assistance Provided Less than 25%   Comment Pt able to use dressing stick to don pull up and pants  Pt requires extra time to complete task, however, succesful at completing task  Lower Body Dressing CARE Score 3   Putting On/Taking Off Footwear   Type of Assistance Needed Physical assistance   Amount of Physical Assistance Provided 25%-49%   Comment Pt seated EOB with LLE lifted on bed  Pt utilized metal sock aid and shoe horn to don sneakers  Pt observed to devlop SOB/wheezing while attempting ot don compression socks  O2 stats dropped to 90%, therapist provided O2 at 2L for 15mins, therapist assessed O2 after with O2 stats elevating to 97%  [facility] Therapist educated and recommended pt to have family A with socks and shoes in order to prevent increased forward flex and SOB   Putting On/Taking Off Footwear CARE Score 3   Picking Up Object   Reason if not Attempted Safety concerns   Picking Up Object CARE Score 88   Dressing/Undressing Clothing   Remove UB Clothes Other  (hospital gown)   [place] UB Clothes [place];Other  (pullover sweater)   Remove  [facility]; Shoes; Other  (compression socks, pull ups)   Limitations Noted In Balance; Endurance;Timeliness   Adaptive Equipment Dressing Stick; Sock Aide;LH Shoehorn   Positioning Supported Sit;Standing   Lying to Sitting on Side of Bed   Comment Pt requires maxA to perform bed mobility   However, pt sleeps on reclienr at home   Reason if not Attempted Activity not applicable   Lying to Sitting on Side of Bed CARE Score 9   Sit to Stand   Type of Assistance Needed Incidental touching   Amount of Physical Assistance Provided No physical assistance   Comment Pt able to perform sit<>stand with no physical A, however, requires extra time to complete   Sit to Stand CARE Score 4   Bed-Chair Transfer   Type of Assistance Needed Incidental touching   Amount of Physical Assistance Provided Less than 25%   Comment CGA with RW   Chair/Bed-to-Chair Transfer CARE Score 3   Transfer Bed/Chair/Wheelchair   Positioning Concerns Skin Integrity   Limitations Noted In Balance; Coordination; Endurance;UE Strength;LE Strength   Adaptive Equipment Roller Walker   Findings moves slowly   Jesus Manuel Mainerachel   Type of Assistance Needed Physical assistance   Amount of Physical Assistance Provided Less than 25%   Comment Pt requires CGA to maintian balance while in stance to perform hygiene for loreto   Toileting Hygiene CARE Score 3   Toileting   Able to [address] A down yes, up yes  Able to Manage Clothing Closures Yes   Manage Hygiene Bladder   Limitations Noted In Balance;UE Strength;LE Strength  (endurance)   Toilet Transfer   Type of Assistance Needed Incidental touching   Amount of Physical Assistance Provided Less than 25%   Comment Pt able to use grab abrs to pull self up, CGA to perform SPT from w/c<>raised toilet seat   Toilet Transfer CARE Score 3   Toilet Transfer   Surface Assessed Raised Toilet   Transfer Technique Stand Pivot   Limitations Noted In Balance; Endurance;UE Strength;LE Strength   Adaptive Equipment Grab Bar   Findings see above   Cognition   Overall Cognitive Status WFL   Arousal/Participation Alert; Cooperative   Attention Within functional limits   Orientation Level Oriented X4   Memory Decreased short term memory   Following Commands Follows multistep commands with increased time or repetition   Activity Tolerance   Activity Tolerance Patient limited by fatigue;Patient tolerated treatment well  (O2 stats b/t 90%-97%)   Assessment   Treatment Assessment Pt seen for 90mins of skilled OT services with focus on self care and functional ADL transfers with RW  Unable to perform shower on this day 2* to unavailable shower glove to cover L PICC line, sponge bathe performed on this day  Pts O2 stats monitored t/o OT session   Pt observed to drop to 90% while performing bending forward to don socks  Therapist placed O2 for 2L for 15mins  Therapist educated pt on use of pursed lpi breathing techniques t/o to increased energy conservation and prevent SOB  Pt verbalized understanding with good carryover of technique t/o tx session  Pt demonstrating good progress during ADL tasks  Pt currently performing UB washing/dressing at supervision, however, continues to require A for below knees for washing and dressing  Therapist educated pt on allowing family to A with socks and shoes to prevent SOB and fatigue, pt verbalized understanding  In addition, therapist recommend pt to have 24hr supervision only for safety  Pt currently performing functional transfers with RW  Pt reporting [employer] members come between 9-2pm  Pt receptive to having supervision at home, pt reporting daughter is working on home agencies to stay at home with pt  Therapist to call daughter to complete POC and amount of supervision needed for safe d /c home  Skilled OT services are warranted to increase functional activity tolerance, dynamic standing balance, BUE strengthening, LB dressings to increase I in functional performance and decrease caregiver burden  Prognosis Good   Problem List Decreased strength;Decreased endurance; Impaired balance;Decreased mobility   Plan   Treatment/Interventions ADL retraining;Functional transfer training; Therapeutic exercise;Patient/family training; Compensatory technique education   Progress Progressing toward goals   Recommendation   OT Discharge Recommendation 24 hour supervision/assist   OT Therapy Minutes   OT Time In 0700   OT Time Out 0830   OT Total Time (minutes) 90   OT Mode of treatment - Individual (minutes) 90   OT Mode of treatment - Concurrent (minutes) 0   OT Mode of treatment - Group (minutes) 0   OT Mode of treatment - Co-treat (minutes) 0   OT Mode of Treatment - Total time(minutes) 90 minutes   OT Cumulative Minutes 270   Therapy Time missed   Time missed?  No

## 2019-10-31 NOTE — PROGRESS NOTES
Physical Medicine and Rehabilitation Progress Note  Claudetta Busman 80 y o  female MRN: [id number]  Unit/Bed#: [facility] Encounter: [id number]    HPI: Claudetta Busman is a 80 y o  female who presented to the [address] [place] [address] with generalized weakness  Patient formally been admitted from October 8th and discharged home on October 10th after fall at home  At the time workup was significant for subdural hematoma  Conservative management was recommended that time  Patient then re-presented on 10/12/19 with weakness and gait instability  During the 2nd admission, patient needed up with acute respiratory failure, requiring admission to ICU  Pulmonary function eventually did improve to the point where patient was able to be discharged to the [facility] on 10/19/19  Unfortunately patient's respiratory function decline, patient found to be in significant respiratory distress on October 21st, necessitated discharge back to acute hospital   Patient was found to have an [place]NP of [id number]  In addition imaging was positive for evidence of overload  Patient was diuresed with torsemide  Eventually pulmonary function improved to the point where patient could return to the [facility]  She was admitted to [facility] on 10/28/19  Chief Complaint: f/u TBI and f/u ambulatory dysfunction    Interval: No acute events overnight  mild SOB this AM after working with therapies  Did receive transfusion last night  Otherwise no other complaints  ROS: A 10 point ROS was performed; negative except as noted above  Assessment/Plan:    * SDH (subdural hematoma) ([facility])  Assessment & Plan  · Conservative management was recommended  · Patient with a right subdural hematoma  · Discussed with neurosurgery, cleared for DVT prophylaxis, and plavix  Per service, bleed looks to have resolved  No need for repeat CTOH unless neurologic decline noted       CKD (chronic kidney disease) stage 3, GFR 30-59 ml/min ([facility])  Assessment & Plan  Results from last 7 days   Lab Units 10/31/19  0441 10/30/19  0538 10/29/19  0525   CREATININE mg/dL 1 84* 1 99* 2 08*     · Continue monitoring kidney function via intermittent BMP  · Avoid nephrotoxic meds/NSAIDs  · Nephrology team following, sandy casillas    Renal artery stenosis [facility])  Assessment & Plan  · Nephrology service is following  · Patient may require repeat ultrasound with pain medications    AZUL (obstructive sleep apnea)  Assessment & Plan  · Continue BiPAP at night  · Will consult pulmonology closer to discharge to evaluate for DME needs for BiPAP    Chronic anemia  Assessment & Plan  Results from last 7 days   Lab Units 10/30/19  0538   HEMOGLOBIN g/dL 7 6*     · Monitor CBC intermittently   · Transfuse for Hgb <7  · Transfused last night, repeat CBC pending    Hyponatremia  Assessment & Plan  Results from last 7 days   Lab Units 10/31/19  0441 10/30/19  0538 10/29/19  0525   SODIUM mmol/L 134* 133* 134*     · Monitor BMP intermittently    Depression  Assessment & Plan  · Continue sertraline    Acute on chronic diastolic heart failure (HCC)  Assessment & Plan  Wt Readings from Last 3 Encounters:   10/31/19 83 kg (182 lb 15 7 oz)   10/28/19 76 kg (167 lb 8 8 oz)   10/27/19 81 9 kg (180 lb 8 9 oz)       · Monitor daily weights  · Torsemide 20 mg daily      Essential hypertension  Assessment & Plan  Temp:  [97 7 °F (36 5 °C)-99 7 °F (37 6 °C)] 97 7 °F (36 5 °C)  HR:  [61-76] 64  Resp:  [18-22] 20  BP: (114-140)/(54-85) 140/65    · Continue Metoprolol   · IM service managing anti-hypertensives, adjusting as needed    H/O: CVA (cerebrovascular accident)  Assessment & Plan  · Plavix was on hold due to bleed  · Discussed with neurosurgery  Patient now cleared to resume       Hypothyroidism  Assessment & Plan  · Continue Synthroid    # Skin  · Encourage regular turning as patient at risk for skin breakdown  · Staff to continue patient education on Q2h turning  · Rehabilitation team to perform skin checks regularly     # Bowel  · Patient reports no constipation  · To ensure regular BMs, bowel regimen consisting of:  colace , dulcolax suppository  and miralax     # Bladder  · Patient voiding spontaneously    # Pain  · Continue tylenol, for max of 3gm daily  # Rehab Psych   · There are no psychological or psychiatric problems identified    # Other  - Diet/Nutrition:        Diet Orders   (From admission, onward)             Start     Ordered    10/28/19 1323  Diet Cardiovascular; Cardiac; Fluid Restriction 1200 ML, Consistent Carbohydrate Diet Level 2 (5 carb servings/75 grams CHO/meal)  Diet effective now     Question Answer Comment   Diet Type Cardiovascular    Cardiac Cardiac    Other Restriction(s): Fluid Restriction 1200 ML    Other Restriction(s): Consistent Carbohydrate Diet Level 2 (5 carb servings/75 grams CHO/meal)    RD to adjust diet per protocol?  Yes        10/28/19 1323              - DVT prophy: Sequential compression device (Venodyne)  and Heparin  - GI ppx: Pantaprazole  - Nausea: None  - Supplements: None  - Sleep: None    Disposition: TBD    CODE: Level 1: Full Code Scheduled Meds:    Current Facility-Administered Medications:  acetaminophen 650 mg Oral Q6H PRN Anton Shrestha MD   albuterol 2 puff Inhalation Q4H PRN Catherine Reese MD   clopidogrel 75 mg Oral Daily Anton Shrestha MD   gabapentin 300 mg Oral TID Anton Shrestha MD   heparin (porcine) 5,000 Units Subcutaneous Q8H [address] Anton Shrestha MD   levothyroxine 112 mcg Oral Daily Anton Shrestha MD   lidocaine 3 patch Topical Daily PRN Anton Shrestha MD   methocarbamol 500 mg Oral Q6H PRN Anton Shrestha MD   metoprolol tartrate 12 5 mg Oral Q12H [address] Anton Shrestha MD   nystatin  Topical BID Anton Shrestha MD   pantoprazole 20 mg Oral Daily Before Breakfast Christiana Miller MD   polyethylene glycol 17 g Oral Daily PRN Anton Shrestha MD   sertraline 25 mg Oral HS Anton Shrestha MD   torsemide 20 mg Oral Daily Anton Eugenio Cardozo MD   trolamine salicylate 1 application Topical 4x Daily NATHEN Escamilla        Objective:    Functional Update:  Physical Therapy Occupational Therapy   Weight Bearing Status: Full Weight Bearing  Transfers: Incidental Touching, Minimal Assistance  Bed Mobility: Minimal Assistance  Amulation Distance (ft): 60 feet  Ambulation: Incidental Touching  Assistive Device for Ambulation: Roller Walker  Ramp: Incidental Touching  Assistive Device for Ramp: Roller Walker  Discharge Recommendations: Home with:  [address] with[de-identified] Family Support, Home Physical Therapy, Outpatient Physical Therapy   Eating: Independent  Grooming: Supervision  Bathing: Maximum Assistance  Bathing: Maximum Assistance  Upper Body Dressing: Moderate Assistance  Lower Body Dressing: Maximum Assistance  Toileting: Total Assistance  Tub/Shower Transfer: Moderate Assistance  Toilet Transfer: Moderate Assistance  Cognition: Within Defined Limits  Orientation: Person, Time, Situation, Place       Allergies per EMR    Physical Exam:  Temp:  [97 7 °F (36 5 °C)-99 7 °F (37 6 °C)] 97 7 °F (36 5 °C)  HR:  [61-76] 64  Resp:  [18-22] 20  BP: (114-140)/(54-85) 140/65  SpO2:  [95 %-100 %] 97 %    General: alert, no apparent distress, cooperative and comfortable  HEENT:  Head: Normocephalic, no lesions, without obvious abnormality  LUNGS:  normal air entry, lungs clear to auscultation  ABDOMEN:  soft, non-tender  Bowel sounds normal  No masses, no organomegaly  EXTREMITIES:  extremities normal, warm and well-perfused; no cyanosis, clubbing, or edema  NEURO:   mental status, speech normal, alert and oriented x3  PSYCH:  Alert and oriented, appropriate affect  Physical examination is otherwise unchanged from previous encounter, except as noted above      Diagnostic Studies: Reviewed, no new imaging  No orders to display     Laboratory: Reviewed    Results from last 7 days   Lab Units 10/30/19  0538   HEMOGLOBIN g/dL 7 6*   HEMATOCRIT % 23 0*   WBC Thousand/uL 5 20     Results from last 7 days   Lab Units 10/31/19  0441 10/30/19  0538 10/29/19  0525   BUN mg/dL 68* 72* 72*   SODIUM mmol/L 134* 133* 134*   POTASSIUM mmol/L 3 9 3 8 3 8   CHLORIDE mmol/L 90* 89* 89*   CREATININE mg/dL 1 84* 1 99* 2 08*            ** Please Note: Fluency Direct voice to text software may have been used in the creation of this document  **    Total time spent:  35 minutes, with more than 50% spent counseling/coordinating care  Counseling includes discussion with patient re: progress in therapies, functional issues observed by therapy staff, and discussion with patient his/her current medical state/wellbeing  Coordination of patient's care was performed in conjunction with Internal Medicine service to monitor patient's labs, vitals, and management of their comorbidities  In addition, this patient was discussed by the interdisciplinary team in weekly case conference today  The care of the patient was extensively discussed with all care providers and an appropriate rehabilitation plan was formulated unique for this patient  Barriers were identified preventing progression of therapy and appropriate interventions were discussed with each discipline  Please see the team note for input from all disciplines regarding barriers, intervention, and discharge planning

## 2019-10-31 NOTE — PROGRESS NOTES
NEPHROLOGY PROGRESS NOTE   Sameer Marroquin 80 y o  female MRN: [id number]  Unit/Bed#: -02 Encounter: [id number]  Reason for Consult: HERLINDA (POA) on CKD III    ASSESSMENT/PLAN:  HERLINDA (POA) on CKD III:  Likely secondary to ischemic injury as well as renal vascular congestion   -baseline creatinine 1 5-1 6   -creatinine slowly improving    -chronic disease likely secondary to renovascular disease plus age-related nephron loss   -may need imaging for renal artery stenosis, history of renal stent  MRI being deferred by patient at this time due to risk for NSF    -continue to avoid nephrotoxins  -will need repeat renal ultrasound when patient is able to tolerate  -I/O   -will need follow-up with Dr Merlos at discharge       Hypertension:  Blood pressure is stable   -avoid episodes of hypotension or high fluctuations in blood pressure   -continue metoprolol and torsemide with holding parameters   -goal systolic blood pressure 995 to 140 for optimal renal perfusion      Volume status/history of systolic CHF:  With ejection fraction of 43%, grade 2 diastolic dysfunction   -continue to monitor daily weights and I/O   -continue on torsemide 20 mg daily   -continue fluid restriction   -chest x-ray on 10/26 showed small bilateral pleural effusions, pulmonary vascular congestion     Mild hyponatremia:  Likely volume mediated  -continue to monitor   -continue on fluid restriction   -continue on current dose of diuretic      Elevated bicarbonate level:  Stable   -will continue to low current diuretic dose      Subdural hematoma:  Further care per primary team     Elevated bicarbonate level: continue to monitor on diuretic       Anemia: Hgb 7 6  C/O SOB with exertion    -continue to monitor and transfuse as needed for hemoglobin less than 7 5   -okay to transfuse with 1 unit of PRBCs  Would give lasix 20 mg IV post transfusion  Disposition:  Requiring additional stay due to rehabilitation needs      SUBJECTIVE:  The patient is doing well today  She denies any current complaints  She still experiencing some shortness of breath with exertion  She denies nausea, vomiting, diarrhea  She denies issues with urination  She continues to work with therapy      OBJECTIVE:  Current Weight: Weight - Scale: 83 kg (182 lb 15 7 oz)  Vitals:    10/30/19 2100 10/30/19 2331 10/31/19 0600 10/31/19 0739   BP: 135/65 117/65  140/65   BP Location:  Right arm  Right arm   Pulse: 67 61  64   Resp: 18 18  20   Temp: 99 3 °F (37 4 °C) 99 7 °F (37 6 °C)  97 7 °F (36 5 °C)   TempSrc: Tympanic Tympanic  Tympanic   SpO2: 96% 96%  97%   Weight:   83 kg (182 lb 15 7 oz)    Height:           Intake/Output Summary (Last 24 hours) at 10/31/2019 1004  Last data filed at 10/31/2019 4444  Gross per 24 hour   Intake 820 ml   Output --   Net 820 ml     General: No apparent distress  Skin: warm, dry, intact, no rash  HEENT: Moist mucous membranes, sclera anicteric, normocephalic  Neck: No apparent JVD appreciated  Chest: Lung sounds clear B/L, on RA  Heart: Regular rate and rhythm, No murmer  Abdomen: Soft, round, NT, +BS  Extremities:  Trace B/L LE edema present  Neuro: Alert and oriented  Psych: Appropriate mood and affect    Medications:    Current Facility-Administered Medications:     acetaminophen (TYLENOL) tablet 650 mg, 650 mg, Oral, Q6H PRN, Anton Shrestha MD, 650 mg at 10/31/19 0903    albuterol (PROVENTIL HFA,VENTOLIN HFA) inhaler 2 puff, 2 puff, Inhalation, Q4H PRN, Yanely Moore MD    clopidogrel (PLAVIX) tablet 75 mg, 75 mg, Oral, Daily, Anton Shrestha MD, 75 mg at 10/31/19 0900    gabapentin (NEURONTIN) capsule 300 mg, 300 mg, Oral, TID, Anton Shrestha MD, 300 mg at 10/31/19 0900    heparin (porcine) subcutaneous injection 5,000 Units, 5,000 Units, Subcutaneous, Q8H [address], Yanely Moore MD, 5,000 Units at 10/31/19 4213    levothyroxine tablet 112 mcg, 112 mcg, Oral, Daily, Yanely Moore MD, 112 mcg at 10/31/19 0635    lidocaine (LIDODERM) 5 % patch 3 patch, 3 patch, Topical, Daily PRN, Aislinn Bhakta MD, 2 patch at 10/30/19 0906    methocarbamol (ROBAXIN) tablet 500 mg, 500 mg, Oral, Q6H PRN, Aislinn Bhakta MD, 500 mg at 10/31/19 0903    metoprolol tartrate (LOPRESSOR) partial tablet 12 5 mg, 12 5 mg, Oral, Q12H [address], Anton Shrestha MD, 12 5 mg at 10/31/19 0900    nystatin (MYCOSTATIN) powder, , Topical, BID, Anton Shrestha MD    pantoprazole (PROTONIX) EC tablet 20 mg, 20 mg, Oral, Daily Before Breakfast, Rosa Maria Hyde MD, 20 mg at 10/31/19 7793    polyethylene glycol (MIRALAX) packet 17 g, 17 g, Oral, Daily PRN, Aislinn Bhakta MD    sertraline (ZOLOFT) tablet 25 mg, 25 mg, Oral, HS, Anton Shrestha MD, 25 mg at 10/30/19 2216    torsemide (DEMADEX) tablet 20 mg, 20 mg, Oral, Daily, Anton Shrestha MD, 20 mg at 10/31/19 0900    trolamine salicylate (ASPERCREME) 10 % cream 1 application, 1 application, Topical, 4x Daily, NATHEN Tse    Laboratory Results:  Results from last 7 days   Lab Units 10/31/19  0441 10/30/19  0538 10/29/19  0525 10/28/19  0433 10/27/19  0446 10/26/19  0641 10/25/19  0457   WBC Thousand/uL  --  5 20  --   --   --   --   --    HEMOGLOBIN g/dL  --  7 6*  --   --   --   --   --    HEMATOCRIT %  --  23 0*  --   --   --   --   --    PLATELETS Thousands/uL  --  197  --   --   --   --   --    POTASSIUM mmol/L 3 9 3 8 3 8 3 7 4 0 4 1 4 3   CHLORIDE mmol/L 90* 89* 89* 90* 93* 93* 95*   CO2 mmol/L 35* 36* 36* 37* 36* 36* 37*   BUN mg/dL 68* 72* 72* 63* 60* 57* 55*   CREATININE mg/dL 1 84* 1 99* 2 08* 1 95* 1 90* 1 83* 1 69*   CALCIUM mg/dL 9 0 8 9 8 8 8 9 9 1 9 4 9 1   MAGNESIUM mg/dL  --   --   --   --  2 1  --   --

## 2019-10-31 NOTE — NURSING NOTE
Pt tolerated the transfusion  Vital signs stable  Afebrile  Alert and oriented times three  When pt went to the bathroom and getting ready for bed, She got a little short of breath  Crackles heard about half way up on the right  Did medicate her with lasix 20mg IV  Will continue to monitor

## 2019-10-31 NOTE — PROGRESS NOTES
Progress Note - Candice Copeland 1934, 80 y o  female MRN: [id number]    Unit/Bed#: [facility] 268-02 Encounter: [id number]    Primary Care Provider: Honey Galan DO   Date and time admitted to hospital: 10/28/2019  1:22 PM    Patient's daughter called this a m     I spoke with her and gave her update on her mom's progress  All questions answered    Chronic anemia  Assessment & Plan  Her hemoglobin did drop from 8 1-->7 6  Patient was experiencing some mild shortness of breath and transfused for symptomatic anemia  Patient is status post transfusion 10/30 she received 1 unit of PRBCs     Continue to transfuse with hemoglobin < 7 0     Acute-on-chronic kidney injury [facility])  Assessment & Plan  Reviewed Nephrology consult  Patient with a baseline creatinine of 1 5-1 6  Her torsemide was reduced secondary to increasing renal function  She does need to have been repeat renal artery ultrasound secondary to history of renal artery stenosis  It was attempted to be completed during his recent hospitalization but was unable to be secondary to discomfort  Creatinine has slightly decreased  1 95--> 2 08 --> 1 99 --> 1 84  Acute on chronic diastolic heart failure ([facility] [address] )  Assessment & Plan  Weight (last 2 days)     Date/Time   Weight    10/31/19 0600   83 (182 98)    10/30/19 0600   83 8 (184 75)    10/29/19 0600   83 6 (184 3)            Patient with acute on chronic diastolic CHF  She did require BiPAP, IV diuresis during her ICU stay  Patient did diurese well  Patient appears euvolemic today  She will continue her torsemide 20 mg daily  This was reduced secondary to worsening bicarb and renal function  Do need to continue to follow closely  Recommend daily weights with the same scale  Weights have been on different scales and therefore unreliable    Last echo was 10/21/2019  Showing moderate left ventricle systolic dysfunction with an EF 41%    Severe hypokinesis to akinesis in the distal half of the anterior, lateral and inferior walls with akinesis of the distal 3rd of the septum  The apex is akinetic to paradoxical  The  base of the heart contracts vigorously  Although these findings could be secondary to coronary artery disease, they also are consistent with Takotsubo's syndrome  Grade 2 left ventricle diastolic dysfunction  Moderate to severe MR  Calcified tricuspid aortic valve  Moderate TR  Mild [place]    Mild bilateral lower extremity edema  Discussed with patient continue compression stockings and elevation of lower extremities  She does experience some shortness of breath today  She did have some episodes that was at rest but that seems to be gradually improving  She has also had some episodes associated with therapy  Spoke with the therapists in these episodes appear to be mild and resolved once she rest   Patient was seen in the therapy gym after ambulating and had no respiratory distress  She states that her shortness of breath was improving    Renal artery stenosis [facility])  Assessment & Plan  Due for repeat renal u/s  Was unable to be completed during hospitalization secondary to patient discomfort  Can be coordinated as OP    Type 2 MI (myocardial infarction) [facility])  Assessment & Plan  Secondary to demand ischemia due to acute on chronic CHF exacerbation  Controlled type 2 diabetes mellitus with diabetic neuropathy, without long-term current use of insulin ([facility] [address] )  Assessment & Plan  Lab Results   Component Value Date    HGBA1C 5 0 08/16/2019       No results for input(s): POCGLU in the last 72 hours  Blood Sugar Average: Last 72 hrs:      blood sugars were well controlled during her hospitalization  Her last A1c was 5 0  She did not require any insulin during hospitalization  Recommend diabetic diet    Hyperlipidemia  Assessment & Plan  No statin secondary to significant myalgias  Ideally patient should be on a statin given her hx  Last lipid panel 10/23/2019: Total cholesterol 144  Triglycerides 223  HDL 33  LDL 66  AZUL (obstructive sleep apnea)  Assessment & Plan  No CPAP at home, new since recent admissions  Discussed with Dr Adolfo Sheffield, will get pulmonology involved and coordinate with nocturnal pulse ox to help with approval for home CPAP  Reviewed results of study for last p m  Ivan Dang Patient was on 2 L nasal cannula  Patient's pulse oximetry was 90-99% 86 6% of the time and 100% 13 4% of the time  Hyponatremia  Assessment & Plan  Sodium is stable at this time  Mild decrease and asymptomatic  Likely due to decreased free water clearance      History of aortic valve replacement  Assessment & Plan  Bioprosthetic valve  Last echo was 10/21/2019  Showing moderate left ventricle systolic dysfunction with an EF 41%  Severe hypokinesis to akinesis in the distal half of the anterior, lateral and inferior walls with akinesis of the distal 3rd of the septum  The apex is akinetic to paradoxical  The  base of the heart contracts vigorously  Although these findings could be secondary to coronary artery disease, they also are consistent with Takotsubo's syndrome  Grade 2 left ventricle diastolic dysfunction  Moderate to severe MR  Calcified tricuspid aortic valve  Moderate TR  Mild [place]    Depression  Assessment & Plan  Stable  Continue sertraline 25 mg daily    Essential hypertension  Assessment & Plan   Some elevation in blood pressure this morning  Overall controlled  Continue metoprolol 25 mg b i d and torsemide 20 mg daily    Hypothyroidism  Assessment & Plan   Continue levothyroxine 112 mcg daily  TSH within normal limits 10/21/2019    * SDH (subdural hematoma) [facility])  Assessment & Plan  Secondary to a fall 10/8  Patient was admitted to [address] [place] [address] 10/8 through 10/10  She was evaluated by Neurosurgery who recommended conservative management of SDH        VTE Pharmacologic Prophylaxis:   Pharmacologic: Heparin    Current Length of Stay: 3 day(s)    Current Patient Status: Inpatient Rehab     Discharge Plan: As per treatment team    Code Status: Level 1 - Full Code    Subjective:    patient reports that she had some shortness of breath this morning that did get slightly better  But she did experience some shortness of breath and dyspnea on exertion with therapy  Spoke with her therapist and reports mild and resolved with rest   Patient was seen in the therapy gym after ambulation and she reports that her shortness of breath was gradually improving  Patient reports that her lower extremity edema is the same  Patient denies chest pain, dizziness, lightheadedness, abdominal pain, nausea, vomiting  She is experiencing some upper back/shoulder pain and requesting Lidoderm patches for  I did discuss this with the nursing staff  Objective:     Vitals:   Temp (24hrs), Av 5 °F (36 9 °C), Min:97 7 °F (36 5 °C), Max:99 7 °F (37 6 °C)    Temp:  [97 7 °F (36 5 °C)-99 7 °F (37 6 °C)] 97 7 °F (36 5 °C)  HR:  [61-76] 64  Resp:  [18-22] 20  BP: (114-140)/(54-85) 140/65  SpO2:  [95 %-100 %] 97 %  Body mass index is 36 96 kg/m²  Review of Systems   Constitutional: Negative for appetite change, chills, fatigue, fever and unexpected weight change  Respiratory: Positive for shortness of breath  Negative for cough, chest tightness and wheezing  Cardiovascular: Positive for leg swelling  Negative for chest pain and palpitations  Gastrointestinal: Negative for abdominal pain, constipation, diarrhea, nausea and vomiting  Musculoskeletal: Positive for arthralgias (posterior shoulder) and back pain  Negative for myalgias  Neurological: Negative for dizziness, syncope, light-headedness and headaches         + restless leg   Psychiatric/Behavioral: Positive for sleep disturbance (related to restless leg)  Input and Output Summary (last 24 hours):        Intake/Output Summary (Last 24 hours) at 10/31/2019 1039  Last data filed at 10/31/2019 0903  Gross per 24 hour   Intake 820 ml   Output -- Net 820 ml       Physical Exam:     Physical Exam   Constitutional: She is oriented to person, place, and time  She appears well-developed and well-nourished  No distress  Euvolemic  Obese   Neck: No JVD present  Cardiovascular: Normal rate, regular rhythm and normal heart sounds  No murmur heard  Trace LE edema B/L  Compression stockings in place   Pulmonary/Chest: Effort normal and breath sounds normal  No respiratory distress  She has no wheezes  Neurological: She is alert and oriented to person, place, and time  No focal deficits   Skin: Skin is warm and dry  Psychiatric: She has a normal mood and affect  Her behavior is normal  Judgment and thought content normal    Nursing note and vitals reviewed        Additional Data:     Labs:    Results from last 7 days   Lab Units 10/30/19  0538   WBC Thousand/uL 5 20   HEMOGLOBIN g/dL 7 6*   HEMATOCRIT % 23 0*   PLATELETS Thousands/uL 197   LYMPHO PCT % 21*   MONO PCT % 9   EOS PCT % 3     Results from last 7 days   Lab Units 10/31/19  0441   SODIUM mmol/L 134*   POTASSIUM mmol/L 3 9   CHLORIDE mmol/L 90*   CO2 mmol/L 35*   BUN mg/dL 68*   CREATININE mg/dL 1 84*   ANION GAP mmol/L 9   CALCIUM mg/dL 9 0   GLUCOSE RANDOM mg/dL 99         Results from last 7 days   Lab Units 10/27/19  0606 10/26/19  0556 10/25/19  2028 10/25/19  1538 10/25/19  1104 10/25/19  0554 10/24/19  2109 10/24/19  1555 10/24/19  1058   POC GLUCOSE mg/dl 121 103 154* 130 123 107 127 144* 221*               Labs reviewed    Imaging:    Imaging reviewed    Recent Cultures (last 7 days):           Last 24 Hours Medication List:     Current Facility-Administered Medications:  acetaminophen 650 mg Oral Q6H PRN Anton Shrestha MD   albuterol 2 puff Inhalation Q4H PRN Anton Shrestha MD   clopidogrel 75 mg Oral Daily Anton Shrestha MD   gabapentin 300 mg Oral TID nAton Shrestha MD   heparin (porcine) 5,000 Units Subcutaneous Q8H [address] Anton Shrestha MD   levothyroxine 112 mcg Oral Daily Anton Shrestha MD   lidocaine 3 patch Topical Daily PRN Anton Shrestha MD   methocarbamol 500 mg Oral Q6H PRN Anton Shrestha MD   metoprolol tartrate 12 5 mg Oral Q12H [facility] Anton Shrestha MD   nystatin  Topical BID Anton Shrestha MD   pantoprazole 20 mg Oral Daily Before Breakfast Dany Clark MD   polyethylene glycol 17 g Oral Daily PRN Narinder Almanza MD   sertraline 25 mg Oral HS Anton Shrestha MD   torsemide 20 mg Oral Daily Anton Shrestha MD   trolamine salicylate 1 application Topical 4x Daily Ana Bobbi Nap, CRNP        M*Modal software was used to dictate this note  It may contain errors with dictating incorrect words or incorrect spelling  Please contact the provider directly with any questions

## 2019-10-31 NOTE — SOCIAL WORK
Met with Pt to review team meeting, and she is in agreement with the d/c plan   offered to call Pts daughter and share the news, as well as to learn about the [facility] left for Pts daughter, Chelly Garcia , sharing the d/c date, and inquiring about [facility], if they were on hold, or if a new referral should be made for [facility]  CM requested a return call

## 2019-10-31 NOTE — ASSESSMENT & PLAN NOTE
Reviewed Nephrology consult  Patient with a baseline creatinine of 1 5-1 6  Her torsemide was reduced secondary to increasing renal function  She does need to have been repeat renal artery ultrasound secondary to history of renal artery stenosis  It was attempted to be completed during his recent hospitalization but was unable to be secondary to discomfort  Creatinine has slightly decreased  1 95--> 2 08 --> 1 99 --> 1 84

## 2019-10-31 NOTE — PROGRESS NOTES
10/31/19 0900   Pain Assessment   Pain Assessment 0-10   Pain Score 5   Pain Type Chronic pain   Pain Location Shoulder   Pain Orientation Bilateral   Restrictions/Precautions   Precautions Fall Risk   Weight Bearing Restrictions No   Subjective   Subjective Pt doesnt have any new complaints   Sit to Stand   Type of Assistance Needed Supervision   Comment extra time to rise to RW   Sit to Stand CARE Score 4   Bed-Chair Transfer   Type of Assistance Needed Adaptive equipment;Supervision   Comment CS with RW   Chair/Bed-to-Chair Transfer CARE Score 4   Transfer Bed/Chair/Wheelchair   Limitations Noted In Balance; Endurance;LE Strength   Adaptive Equipment Roller Walker   Stand Pivot Supervision   Sit to Stand Supervision   Stand to Gabriel Thomas time to rise,  raised seat height   Walk 10 Feet   Type of Assistance Needed Supervision; Adaptive equipment   Comment CS with RW   Walk 10 Feet CARE Score 4   Walk 50 Feet with Two Turns   Type of Assistance Needed Adaptive equipment;Supervision   Comment CS with RW   Walk 50 Feet with Two Turns CARE Score 4   Walking 10 Feet on Uneven Surfaces   Type of Assistance Needed Supervision;Verbal cues   Comment close gaurd with RW   Walking 10 Feet on Uneven Surfaces CARE Score 4   Ambulation   Primary Mode of Locomotion Prior to Admission Walk   Distance Walked (feet) 50 ft   Assist Device Roller Walker   Gait Pattern Slow Jenny;Decreased foot clearance;R foot drag;L foot drag   Limitations Noted In Balance; Endurance; Heel Strike;Posture;Speed;Strength   Findings Despite gt deviations no overt LOB noted or trip/mis step   Wheelchair mobility   Does the patient use a wheelchair? 0   No   Therapeutic Interventions   Strengthening seated LAQ arom, Marching arom, 8x3,  ankle DF/PF off 2''box 20ea   Balance Reaching for items when standing at walker (no LOB)   Assessment   Treatment Assessment Pt participated in session initially needed to use bathroom so pt amb to bathroom 15', was able to stand unsupprted to manage pants  After bathroom pt stated slight SOB but all vitals WNL  Subsided with seated rest   Pt is slow but steady with ambulation and transfers  Due to deconditioning pt needs frequent rest breaks  Ed pt on staying with RW when reaching for items to ensure balance and safety  Cont skilled PT toward LTGs   Problem List Decreased strength;Decreased endurance; Impaired balance;Decreased mobility   Plan   Treatment/Interventions Functional transfer training;LE strengthening/ROM; Therapeutic exercise; Endurance training;Gait training   Progress Progressing toward goals   Recommendation   Equipment Recommended Walker   PT Therapy Minutes   PT Time In 0900   PT Time Out 1030   PT Total Time (minutes) 90   PT Mode of treatment - Individual (minutes) 90   PT Mode of treatment - Concurrent (minutes) 0   PT Mode of treatment - Group (minutes) 0   PT Mode of treatment - Co-treat (minutes) 0   PT Mode of Treatment - Total time(minutes) 90 minutes   PT Cumulative Minutes 270   Therapy Time missed   Time missed?  No

## 2019-10-31 NOTE — ASSESSMENT & PLAN NOTE
Sodium is stable at this time  Mild decrease and asymptomatic    Likely due to decreased free water clearance

## 2019-10-31 NOTE — ASSESSMENT & PLAN NOTE
Weight (last 2 days)     Date/Time   Weight    10/31/19 0600   83 (182 98)    10/30/19 0600   83 8 (184 75)    10/29/19 0600   83 6 (184 3)            Patient with acute on chronic diastolic CHF  She did require BiPAP, IV diuresis during her ICU stay  Patient did diurese well  Patient appears euvolemic today  She will continue her torsemide 20 mg daily  This was reduced secondary to worsening bicarb and renal function  Do need to continue to follow closely  Recommend daily weights with the same scale  Weights have been on different scales and therefore unreliable    Last echo was 10/21/2019  Showing moderate left ventricle systolic dysfunction with an EF 41%  Severe hypokinesis to akinesis in the distal half of the anterior, lateral and inferior walls with akinesis of the distal 3rd of the septum  The apex is akinetic to paradoxical  The  base of the heart contracts vigorously  Although these findings could be secondary to coronary artery disease, they also are consistent with Takotsubo's syndrome  Grade 2 left ventricle diastolic dysfunction  Moderate to severe MR  Calcified tricuspid aortic valve  Moderate TR  Mild [place]    Mild bilateral lower extremity edema  Discussed with patient continue compression stockings and elevation of lower extremities  She does experience some shortness of breath today  She did have some episodes that was at rest but that seems to be gradually improving  She has also had some episodes associated with therapy  Spoke with the therapists in these episodes appear to be mild and resolved once she rest   Patient was seen in the therapy gym after ambulating and had no respiratory distress    She states that her shortness of breath was improving

## 2019-10-31 NOTE — ASSESSMENT & PLAN NOTE
No CPAP at home, new since recent admissions  Discussed with Dr Pauly Huerta, will get pulmonology involved and coordinate with nocturnal pulse ox to help with approval for home CPAP  Reviewed results of study for last p m  [facility] Patient was on 2 L nasal cannula  Patient's pulse oximetry was 90-99% 86 6% of the time and 100% 13 4% of the time

## 2019-10-31 NOTE — OCCUPATIONAL THERAPY NOTE
Therapist spoke with daughter this AM  Pt reporting concerns of including therapy to increase ROM of neck, therapist reported to daughter to see surgeons orders and assess if appropriate  Therapist will reach back with further information to report to daughter  In addition, therapist discussed concerns with daughter to provide pt with 24 supervision for all mobility and ADLs only for safety in order to prevent falls  Daughter reporting [employer] A when she is not available  Daughter verbalizing understanding and reporting she is currently working on a plan to provide supervision upon d/c

## 2019-10-31 NOTE — ASSESSMENT & PLAN NOTE
Secondary to a fall 10/8  Patient was admitted to [facility] 10/8 through 10/10  She was evaluated by Neurosurgery who recommended conservative management of SDH

## 2019-10-31 NOTE — ASSESSMENT & PLAN NOTE
Some elevation in blood pressure this morning  Overall controlled    Continue metoprolol 25 mg b i d and torsemide 20 mg daily

## 2019-11-01 LAB
ANION GAP SERPL CALCULATED.3IONS-SCNC: 10 MMOL/L (ref 5–14)
BUN SERPL-MCNC: 68 MG/DL (ref 5–25)
CALCIUM SERPL-MCNC: 9.5 MG/DL (ref 8.4–10.2)
CHLORIDE SERPL-SCNC: 91 MMOL/L (ref 97–108)
CO2 SERPL-SCNC: 34 MMOL/L (ref 22–30)
CREAT SERPL-MCNC: 1.76 MG/DL (ref 0.6–1.2)
GFR SERPL CREATININE-BSD FRML MDRD: 26 ML/MIN/1.73SQ M
GLUCOSE P FAST SERPL-MCNC: 121 MG/DL (ref 70–99)
GLUCOSE SERPL-MCNC: 121 MG/DL (ref 70–99)
POTASSIUM SERPL-SCNC: 4.1 MMOL/L (ref 3.6–5)
SODIUM SERPL-SCNC: 135 MMOL/L (ref 137–147)

## 2019-11-01 PROCEDURE — 97530 THERAPEUTIC ACTIVITIES: CPT

## 2019-11-01 PROCEDURE — 97110 THERAPEUTIC EXERCISES: CPT

## 2019-11-01 PROCEDURE — 99232 SBSQ HOSP IP/OBS MODERATE 35: CPT | Performed by: NURSE PRACTITIONER

## 2019-11-01 PROCEDURE — 99232 SBSQ HOSP IP/OBS MODERATE 35: CPT | Performed by: INTERNAL MEDICINE

## 2019-11-01 PROCEDURE — 99232 SBSQ HOSP IP/OBS MODERATE 35: CPT | Performed by: PHYSICAL MEDICINE & REHABILITATION

## 2019-11-01 PROCEDURE — 97535 SELF CARE MNGMENT TRAINING: CPT

## 2019-11-01 PROCEDURE — 80048 BASIC METABOLIC PNL TOTAL CA: CPT | Performed by: PHYSICAL MEDICINE & REHABILITATION

## 2019-11-01 RX ORDER — TORSEMIDE 20 MG/1
40 TABLET ORAL DAILY
Status: DISCONTINUED | OUTPATIENT
Start: 2019-11-01 | End: 2019-11-01

## 2019-11-01 RX ORDER — FUROSEMIDE 10 MG/ML
20 INJECTION INTRAMUSCULAR; INTRAVENOUS ONCE
Status: COMPLETED | OUTPATIENT
Start: 2019-11-01 | End: 2019-11-01

## 2019-11-01 RX ORDER — ISOSORBIDE MONONITRATE 30 MG/1
30 TABLET, EXTENDED RELEASE ORAL DAILY
Status: DISCONTINUED | OUTPATIENT
Start: 2019-11-01 | End: 2019-11-06 | Stop reason: HOSPADM

## 2019-11-01 RX ORDER — TORSEMIDE 20 MG/1
40 TABLET ORAL DAILY
Status: DISCONTINUED | OUTPATIENT
Start: 2019-11-02 | End: 2019-11-03

## 2019-11-01 RX ORDER — ALBUTEROL SULFATE 2.5 MG/3ML
2.5 SOLUTION RESPIRATORY (INHALATION) EVERY 6 HOURS PRN
Status: DISCONTINUED | OUTPATIENT
Start: 2019-11-01 | End: 2019-11-04

## 2019-11-01 RX ORDER — MUSCLE RUB CREAM 100; 150 MG/G; MG/G
CREAM TOPICAL 4 TIMES DAILY PRN
Status: DISCONTINUED | OUTPATIENT
Start: 2019-11-01 | End: 2019-11-06 | Stop reason: HOSPADM

## 2019-11-01 RX ADMIN — SERTRALINE 25 MG: 25 TABLET, FILM COATED ORAL at 21:35

## 2019-11-01 RX ADMIN — ACETAMINOPHEN 650 MG: 325 TABLET ORAL at 12:38

## 2019-11-01 RX ADMIN — METOPROLOL TARTRATE 12.5 MG: 25 TABLET ORAL at 08:08

## 2019-11-01 RX ADMIN — LIDOCAINE 3 PATCH: 50 PATCH CUTANEOUS at 12:57

## 2019-11-01 RX ADMIN — CLOPIDOGREL BISULFATE 75 MG: 75 TABLET, FILM COATED ORAL at 08:09

## 2019-11-01 RX ADMIN — GABAPENTIN 300 MG: 300 CAPSULE ORAL at 16:55

## 2019-11-01 RX ADMIN — NYSTATIN: 100000 POWDER TOPICAL at 18:46

## 2019-11-01 RX ADMIN — ALBUTEROL SULFATE 2 PUFF: 90 AEROSOL, METERED RESPIRATORY (INHALATION) at 16:58

## 2019-11-01 RX ADMIN — HEPARIN SODIUM 5000 UNITS: 5000 INJECTION INTRAVENOUS; SUBCUTANEOUS at 15:06

## 2019-11-01 RX ADMIN — HEPARIN SODIUM 5000 UNITS: 5000 INJECTION INTRAVENOUS; SUBCUTANEOUS at 21:35

## 2019-11-01 RX ADMIN — METOPROLOL TARTRATE 12.5 MG: 25 TABLET ORAL at 21:35

## 2019-11-01 RX ADMIN — METHOCARBAMOL 500 MG: 500 TABLET ORAL at 15:06

## 2019-11-01 RX ADMIN — GABAPENTIN 300 MG: 300 CAPSULE ORAL at 21:35

## 2019-11-01 RX ADMIN — TROLAMINE SALICYLATE 1 APPLICATION: 10 CREAM TOPICAL at 21:35

## 2019-11-01 RX ADMIN — NYSTATIN: 100000 POWDER TOPICAL at 08:17

## 2019-11-01 RX ADMIN — METHOCARBAMOL 500 MG: 500 TABLET ORAL at 23:21

## 2019-11-01 RX ADMIN — PANTOPRAZOLE 20 MG: 20 TABLET, DELAYED RELEASE ORAL at 06:31

## 2019-11-01 RX ADMIN — METHOCARBAMOL 500 MG: 500 TABLET ORAL at 08:13

## 2019-11-01 RX ADMIN — ALBUTEROL SULFATE 2 PUFF: 90 AEROSOL, METERED RESPIRATORY (INHALATION) at 07:20

## 2019-11-01 RX ADMIN — FUROSEMIDE 20 MG: 10 INJECTION, SOLUTION INTRAMUSCULAR; INTRAVENOUS at 10:16

## 2019-11-01 RX ADMIN — ACETAMINOPHEN 650 MG: 325 TABLET ORAL at 23:21

## 2019-11-01 RX ADMIN — LEVOTHYROXINE SODIUM 112 MCG: 112 TABLET ORAL at 06:31

## 2019-11-01 RX ADMIN — HEPARIN SODIUM 5000 UNITS: 5000 INJECTION INTRAVENOUS; SUBCUTANEOUS at 06:32

## 2019-11-01 RX ADMIN — ISOSORBIDE MONONITRATE 30 MG: 30 TABLET, EXTENDED RELEASE ORAL at 10:42

## 2019-11-01 RX ADMIN — TORSEMIDE 30 MG: 20 TABLET ORAL at 08:09

## 2019-11-01 RX ADMIN — GABAPENTIN 300 MG: 300 CAPSULE ORAL at 08:09

## 2019-11-01 RX ADMIN — TROLAMINE SALICYLATE 1 APPLICATION: 10 CREAM TOPICAL at 08:15

## 2019-11-01 NOTE — NURSING NOTE
Pt alert and oriented times three  Assisted to the bathroom  Pt gets a little short of breath   Lungs are clear but decreased throughout,  Pt wanted oxygen on for sleep  Her saturation was between 94 and 99%  I put her on 1L  Call bell with in reach, continue to monitor

## 2019-11-01 NOTE — ASSESSMENT & PLAN NOTE
Bioprosthetic valve  Last echo was 10/21/2019  Showing moderate left ventricle systolic dysfunction with an EF 41%  Severe hypokinesis to akinesis in the distal half of the anterior, lateral and inferior walls with akinesis of the distal 3rd of the septum  The apex is akinetic to paradoxical  The  base of the heart contracts vigorously  Although these findings could be secondary to coronary artery disease, they also are consistent with Takotsubo's syndrome  Grade 2 left ventricle diastolic dysfunction  Moderate to severe MR  Calcified tricuspid aortic valve  Moderate TR    Mild [place]

## 2019-11-01 NOTE — ASSESSMENT & PLAN NOTE
Secondary to a fall 10/8  Patient was admitted to [facility] 10/8 through 10/10  She was evaluated by Neurosurgery who recommended conservative management of SDH

## 2019-11-01 NOTE — ASSESSMENT & PLAN NOTE
Weight (last 2 days)     Date/Time   Weight    11/02/19 0600   83 6 (184 3)    11/01/19 0600   83 1 (183 2)    10/31/19 0600   83 (182 98)            Patient with acute on chronic diastolic CHF  She did require BiPAP, IV diuresis during her ICU stay  Recommend daily weights with the same scale  Weights have been on different scales and therefore unreliable - discussed with nursing to have same scale daily  Last echo was 10/21/2019  Showing moderate left ventricle systolic dysfunction with an EF 41%  Severe hypokinesis to akinesis in the distal half of the anterior, lateral and inferior walls with akinesis of the distal 3rd of the septum  The apex is akinetic to paradoxical  The  base of the heart contracts vigorously  Although these findings could be secondary to coronary artery disease, they also are consistent with Takotsubo's syndrome  Grade 2 left ventricle diastolic dysfunction  Moderate to severe MR  Calcified tricuspid aortic valve  Moderate TR  Mild [place]    Trace bilateral lower extremity edema  Discussed with patient continue compression stockings and elevation of lower extremities  She is having some SOB, HERNANDEZ this am - improved from yesterday  She did receive lasix 20mg IV yesterday and torsemide has been titrated up and is now 40mg daily - first dose today  She does c/o wheezing and she is noted to have diffuse wheezing, no crackles    D/W pt and nursing and she will be getting an albuterol neb at this time    Repeat BMP in am

## 2019-11-01 NOTE — CONSULTS
Consultation - Pulmonary Medicine   Jeimy Frederick 80 y o  female MRN: [id number]  Unit/Bed#: [facility] 410-33 Encounter: [id number]      Physician Requesting Consult: Dr Teresa Shepard  Reason for Consult: BIPAP needs    Assessment/Plan:    1  Chronic hypercapnic respiratory failure  · This is likely secondary to her chronic heart failure as well as her untreated sleep apnea  · She will need a repeat outpatient sleep study with an ABG to see if she qualifies for BiPAP with sleep  · I agree with checking a nocturnal pulse ox prior to discharge to see if she qualifies for home oxygen with sleep  · She will need to follow up in our office after discharge to arrange a sleep study  I gave her daughter our card and instructed her to call for appointment after discharge  2  AZUL not compliant with CPAP  · Since her last sleep study was over 5 years ago, she will require a repeat study  She would benefit from a home study if she qualifies since she has difficulty sleeping at the sleep lab  3  Deconditioning  1  Cont  Rehab    I discussed the plan of care in detail with Ms Amy Erwin and her daughter at her side  I addressed all of their questions and concerns  ______________________________________________________________________    HPI:    Jeimy Frederick is a 80 y o  female who is admitted in acute rehab after a hospitalization for a subdural hematoma  The patient required intubation for respiratory failure during her stay  There is no nurse surgical intervention for her subdural hematoma  After discharge, she was transferred back to the hospital and treated for acute on chronic respiratory failure secondary to diastolic heart failure  She is now on rehab and we are asked to see her to evaluate for long-term BiPAP needs  Ms Amy Erwin had been wearing BiPAP with sleep in the rehab unit  She has no complaints regarding the BiPAP  She does not have a home machine  She does have a known diagnosis of sleep apnea    She has had 4 sleep studies in the past   She used to have a CPAP machine but she lost it at least 10 years ago  Her sleeping improved when she started sleeping upright in a recliner  She is willing to have a repeat sleep study and she is willingto use BiPAP if recommended  Review of Systems:  Full 12 point review of systems was performed  Aside from what was mentioned in the HPI, it is otherwise negative      Historical Information   Past Medical History:   Diagnosis Date    Arthritis     Breast cancer ([facility] ) 2015    Cardiac disease     aortic valve transplant    [facility]congestive heart failure) ([facility]     Compression fracture of body of thoracic vertebra (HCC)     CVA (cerebral vascular accident) ([facility] )     Diabetes mellitus ([place] [address] )     Disease of thyroid gland     Fibromyalgia, primary     H/O cervical fracture 01/09/2019    Hyperlipidemia     Hypertension     Neuropathy     AZUL (obstructive sleep apnea) 10/19/2019    Pressure injury of skin     Renal disorder     kidney stent    Stroke [facility])      Past Surgical History:   Procedure Laterality Date    AORTIC VALVE SURGERY      BREAST LUMPECTOMY Right     BREAST LUMPECTOMY Left     BREAST SURGERY      lumpectomy for breast cancer    CATARACT EXTRACTION      CHOLECYSTECTOMY      HYSTERECTOMY  1978    JOINT REPLACEMENT      KIDNEY SURGERY      stent placed for kidney    OTHER SURGICAL HISTORY      abdominal aneurysm surgery    DC ARTHRODESIS POSTERIOR ATLAS-AXIS C1-C2 N/A 5/1/2019    Procedure: C1-C2 lateral mass fixation fusion with possible sublaminar cables;  Surgeon: Keith Wolff MD;  Location: [facility];  Service: Neurosurgery    REPLACEMENT TOTAL KNEE      left      Social History   Social History     Substance and Sexual Activity   Alcohol Use Yes    Frequency: Monthly or less    Drinks per session: 1 or 2    Binge frequency: Less than monthly     Social History     Tobacco Use   Smoking Status Never Smoker   Smokeless Tobacco Never Used Family History:   Family History   Problem Relation Age of Onset    Heart disease Mother     Arthritis Mother     Diabetes Mother     Heart failure Father     Arthritis Father     Other Father         enlargement of prostate     Dementia Father     No Known Problems Daughter     No Known Problems Maternal Grandmother     No Known Problems Maternal Grandfather     No Known Problems Paternal Grandmother     No Known Problems Paternal Grandfather     No Known Problems Maternal Aunt     No Known Problems Maternal Aunt     No Known Problems Maternal Aunt     No Known Problems Maternal Aunt     No Known Problems Paternal Aunt     No Known Problems Paternal Aunt        Medications: The patient's active and prehospital medications were reviewed      Current Facility-Administered Medications:  acetaminophen 650 mg Oral Q6H PRN Anton Shrestha MD   albuterol 2 puff Inhalation Q4H PRN Anton Shrestha MD   albuterol 2 5 mg Nebulization Q6H PRN NATHEN Escamilla   clopidogrel 75 mg Oral Daily Anton Shrestha MD   gabapentin 300 mg Oral TID Anton Shrestha MD   heparin (porcine) 5,000 Units Subcutaneous Q8H [facility] Anton Shrestha MD   isosorbide mononitrate 30 mg Oral Daily NATHEN Escamilla   levothyroxine 112 mcg Oral Daily Anton Shrestha MD   lidocaine 3 patch Topical Daily PRN Anton Shrestha MD   methocarbamol 500 mg Oral Q6H PRN Anton Shrestha MD   metoprolol tartrate 12 5 mg Oral Q12H [facility] Anton Shrestha MD   nystatin  Topical BID Anton Shrestha MD   pantoprazole 20 mg Oral Daily Before Breakfast Yvonne Ba MD   polyethylene glycol 17 g Oral Daily PRN Ramandeep Mariee MD   sertraline 25 mg Oral HS Anton Shrestha MD   [START ON 11/2/2019] torsemide 40 mg Oral Daily NATHEN Escamilla   trolamine salicylate 1 application Topical 4x Daily NATHEN Escamilla         PhysicalExamination:  Vitals:   Vitals:    11/01/19 0600 11/01/19 0732 11/01/19 1022 11/01/19 1522   BP: 142/64 138/61 134/62   BP Location:  Right arm Right arm Right arm   Pulse:  67 73 68   Resp:  20  20   Temp:  98 7 °F (37 1 °C)  98 °F (36 7 °C)   TempSrc:  Tympanic  Tympanic   SpO2:  98%  98%   Weight: 83 1 kg (183 lb 3 2 oz)      Height:         Body mass index is 37 kg/m²  Temp  Min: 96 5 °F (35 8 °C)  Max: 99 7 °F (37 6 °C)  IBW: 43 2 kg    SpO2: 98 %,   SpO2 Activity: At Rest,   O2 Device: None (Room air)    GEN: WDWN, nad, comfortable  HEENT: NCAT, EOMI  CVS: Regular  LUNGS: Clear b/l b s    ABD: soft, obese  EXT: No c/c  NEURO: No focal deficits  MS: Moving all extremities  SKIN: warm, dry  PSYCH: calm, cooperative    Diagnostic Data:  CBC:   Results from last 7 days   Lab Units 10/31/19  1300 10/30/19  0538   WBC Thousand/uL 5 90 5 20   HEMOGLOBIN g/dL 9 3* 7 6*   HEMATOCRIT % 27 4* 23 0*   PLATELETS Thousands/uL 205 197       CMP:   Results from last 7 days   Lab Units 11/01/19  0729 10/31/19  0441 10/30/19  0538   SODIUM mmol/L 135* 134* 133*   POTASSIUM mmol/L 4 1 3 9 3 8   CHLORIDE mmol/L 91* 90* 89*   CO2 mmol/L 34* 35* 36*   BUN mg/dL 68* 68* 72*   CREATININE mg/dL 1 76* 1 84* 1 99*   CALCIUM mg/dL 9 5 9 0 8 9       Labs reviewed by me personally reveal anemia and metabolic alkalosis        ABG: 10/24/2019: 7 44/53/85 saturation 95%    Imaging:  CXR 10/26/2019 reviewed by me personally reveal Mild pulmonary vascular congestion with bilateral pleural effusions    Cardiac lab/EKG/telemetry/ECHO:   ECHO: EF 29%; grade 2 diastolic dysfunction, regional wall akinesis    Dalia Murphy DO

## 2019-11-01 NOTE — PROGRESS NOTES
NEPHROLOGY PROGRESS NOTE   Milagro Colón 80 y o  female MRN: [id number]  Unit/Bed#: -02 Encounter: [id number]  Reason for Consult: HERLINDA (POA) on CKD III    ASSESSMENT/PLAN:  HERLINDA (POA) on CKD III:  Likely secondary to ischemic injury as well as renal vascular congestion   -baseline creatinine 1 5-1 6   -creatinine continues to improve    -chronic disease likely secondary to renovascular disease plus age-related nephron loss   -may need imaging for renal artery stenosis, history of renal stent   MRI being deferred by patient at this time due to risk for NSF    -continue to avoid nephrotoxins  -will need repeat renal ultrasound when patient is able to tolerate  -I/O   -will need follow-up with Dr Merlos at discharge       Hypertension:  Blood pressure is stable   -avoid episodes of hypotension or high fluctuations in blood pressure   -continue metoprolol and torsemide with holding parameters   -goal systolic blood pressure 909 to 140 for optimal renal perfusion      Volume status/history of systolic CHF:  With ejection fraction of 43%, grade 2 diastolic dysfunction   -continue to monitor daily weights and I/O   -continue on torsemide 20 mg daily   -continue fluid restriction   -chest x-ray on 10/26 showed small bilateral pleural effusions, pulmonary vascular congestion     Mild hyponatremia:  Likely volume mediated  -continue to monitor   -continue on fluid restriction   -continue on current dose of diuretic      Subdural hematoma:  Further care per primary team      Elevated bicarbonate level: continue to monitor on diuretic  Improving      Anemia: Hgb 7 6  C/O SOB with exertion    -continue to monitor and transfuse as needed for hemoglobin less than 7 5   -okay to transfuse with 1 unit of PRBCs   Would give lasix 20 mg IV post transfusion      Disposition:  Requiring additional stay due to rehabilitation needs  SUBJECTIVE:  The patient is working with therapy in the ADL room  She is doing well today  She denies chest pain  She does have some shortness of breath with exertion  She denies nausea, vomiting, diarrhea  She denies issues with urination      OBJECTIVE:  Current Weight: Weight - Scale: 83 1 kg (183 lb 3 2 oz)  Vitals:    10/31/19 2054 11/01/19 0600 11/01/19 0732 11/01/19 1022   BP: 163/69  142/64 138/61   BP Location:   Right arm Right arm   Pulse: 70  67 73   Resp:   20    Temp:   98 7 °F (37 1 °C)    TempSrc:   Tympanic    SpO2:   98%    Weight:  83 1 kg (183 lb 3 2 oz)     Height:           Intake/Output Summary (Last 24 hours) at 11/1/2019 1209  Last data filed at 10/31/2019 1700  Gross per 24 hour   Intake 230 ml   Output --   Net 230 ml     General: No apparent distress  Skin: warm, dry, intact, no rash  HEENT: Moist mucous membranes, sclera anicteric, normocephalic  Neck: No apparent JVD appreciated  Chest: Lung sounds clear B/L, on RA  Heart: Regular rate and rhythm, No murmer  Abdomen: Soft, round, NT, +BS  Extremities: No B/L LE edema present  Neuro: Alert and oriented  Psych: Appropriate mood and affect    Medications:    Current Facility-Administered Medications:     acetaminophen (TYLENOL) tablet 650 mg, 650 mg, Oral, Q6H PRN, Jimbo Hdz MD, 650 mg at 10/31/19 2053    albuterol (PROVENTIL HFA,VENTOLIN HFA) inhaler 2 puff, 2 puff, Inhalation, Q4H PRN, Jimbo Hdz MD, 2 puff at 11/01/19 0720    albuterol inhalation solution 2 5 mg, 2 5 mg, Nebulization, Q6H PRN, NATHEN Escamilla    clopidogrel (PLAVIX) tablet 75 mg, 75 mg, Oral, Daily, Anton Shrestha MD, 75 mg at 11/01/19 0809    gabapentin (NEURONTIN) capsule 300 mg, 300 mg, Oral, TID, Anton Shrestha MD, 300 mg at 11/01/19 0809    heparin (porcine) subcutaneous injection 5,000 Units, 5,000 Units, Subcutaneous, Q8H [address], Anton Shrestha MD, 5,000 Units at 11/01/19 4879    isosorbide mononitrate (IMDUR) 24 hr tablet 30 mg, 30 mg, Oral, Daily, NATHEN Escamilla, 30 mg at 11/01/19 1042    levothyroxine tablet 112 mcg, 112 mcg, Oral, Daily, Anton Shrestha MD, 112 mcg at 11/01/19 0631    lidocaine (LIDODERM) 5 % patch 3 patch, 3 patch, Topical, Daily PRN, Alton Pink MD, 2 patch at 10/31/19 1030    methocarbamol (ROBAXIN) tablet 500 mg, 500 mg, Oral, Q6H PRN, Alton Pink MD, 500 mg at 11/01/19 0813    metoprolol tartrate (LOPRESSOR) partial tablet 12 5 mg, 12 5 mg, Oral, Q12H [address], Anton Shrestha MD, 12 5 mg at 11/01/19 0808    nystatin (MYCOSTATIN) powder, , Topical, BID, Anton Shrestha MD    pantoprazole (PROTONIX) EC tablet 20 mg, 20 mg, Oral, Daily Before Breakfast, Arron Coronado MD, 20 mg at 11/01/19 0631    polyethylene glycol (MIRALAX) packet 17 g, 17 g, Oral, Daily PRN, Alton Pink MD    sertraline (ZOLOFT) tablet 25 mg, 25 mg, Oral, HS, Anton Shrestha MD, 25 mg at 10/31/19 2054    torsemide (DEMADEX) tablet 30 mg, 30 mg, Oral, Daily, Arron Coronado MD, 30 mg at 11/01/19 0809    trolamine salicylate (ASPERCREME) 10 % cream 1 application, 1 application, Topical, 4x Daily, Nieves Curling, CRNP, 1 application at 68/97/80 0815    Laboratory Results:  Results from last 7 days   Lab Units 11/01/19  0729 10/31/19  1300 10/31/19  0441 10/30/19  0538 10/29/19  0525 10/28/19  0433 10/27/19  0446 10/26/19  0641   WBC Thousand/uL  --  5 90  --  5 20  --   --   --   --    HEMOGLOBIN g/dL  --  9 3*  --  7 6*  --   --   --   --    HEMATOCRIT %  --  27 4*  --  23 0*  --   --   --   --    PLATELETS Thousands/uL  --  205  --  197  --   --   --   --    POTASSIUM mmol/L 4 1  --  3 9 3 8 3 8 3 7 4 0 4 1   CHLORIDE mmol/L 91*  --  90* 89* 89* 90* 93* 93*   CO2 mmol/L 34*  --  35* 36* 36* 37* 36* 36*   BUN mg/dL 68*  --  68* 72* 72* 63* 60* 57*   CREATININE mg/dL 1 76*  --  1 84* 1 99* 2 08* 1 95* 1 90* 1 83*   CALCIUM mg/dL 9 5  --  9 0 8 9 8 8 8 9 9 1 9 4   MAGNESIUM mg/dL  --   --   --   --   --   --  2 1  --

## 2019-11-01 NOTE — ASSESSMENT & PLAN NOTE
Reviewed Nephrology note  Patient with a baseline creatinine of 1 5-1 6  Her torsemide was reduced secondary to increasing renal function  She does need to have been repeat renal artery ultrasound secondary to history of renal artery stenosis  It was attempted to be completed during his recent hospitalization but was unable to be secondary to discomfort  Torsemide was increased yesterday by Dr Fletcher Nielson to 30mg daily  Creatinine has slightly decreased  1 95--> 2 08 --> 1 99 --> 1 84 --> 1 76

## 2019-11-01 NOTE — PROGRESS NOTES
11/01/19 1230   Pain Assessment   Pain Assessment 0-10   Pain Score 6   Pain Type Acute pain;Chronic pain   Pain Location Neck   Pain Orientation Bilateral   Pain Radiating Towards shoulders   Pain Descriptors Sore   Pain Frequency Intermittent   Pain Onset Ongoing   Effect of Pain on Daily Activities posture with mobility   Restrictions/Precautions   Precautions Fall Risk   Cognition   Overall Cognitive Status WFL   Subjective   Subjective pt c/o soreness in shoulders/neck, pt c/o HA at end of session   Roll Left and Right   Reason if not Attempted Activity not applicable   Roll Left and Right CARE Score 9   Sit to Lying   Reason if not Attempted Activity not applicable   Sit to Lying CARE Score 9   Lying to Sitting on Side of Bed   Reason if not Attempted Activity not applicable   Lying to Sitting on Side of Bed CARE Score 9   Sit to Stand   Type of Assistance Needed Supervision   Amount of Physical Assistance Provided No physical assistance   Sit to Stand CARE Score 4   Bed-Chair Transfer   Type of Assistance Needed Supervision; Adaptive equipment   Amount of Physical Assistance Provided No physical assistance   Comment CS   Chair/Bed-to-Chair Transfer CARE Score 4   Transfer Bed/Chair/Wheelchair   Limitations Noted In Balance; Endurance;LE Strength   Adaptive Equipment Roller Walker   Stand Pivot Supervision   Sit to Stand Supervision   Stand to Sit Supervision   Findings increase time to perform all txs   Walk 10 Feet   Type of Assistance Needed Supervision; Adaptive equipment   Amount of Physical Assistance Provided No physical assistance   Comment CS with RW   Walk 10 Feet CARE Score 4   Walk 50 Feet with Two Turns   Type of Assistance Needed Supervision; Adaptive equipment   Amount of Physical Assistance Provided No physical assistance   Comment CS with RW   Walk 50 Feet with Two Turns CARE Score 4   Walk 150 Feet   Reason if not Attempted Activity not applicable   Walk 233 Feet CARE Score 9   Walking 10 Feet on Uneven Surfaces   Reason if not Attempted Safety concerns   Walking 10 Feet on Uneven Surfaces CARE Score 88   Ambulation   Does the patient walk? 2  Yes   Primary Mode of Locomotion Prior to Admission Walk   Distance Walked (feet) 60 ft  (x2, 75x1)   Assist Device Roller Walker   Gait Pattern Inconsistant Jenny; Slow Jenny;Decreased foot clearance; Forward Flexion; Improper weight shift   Limitations Noted In Balance; Endurance;Speed;Strength;Swing   Provided Assistance with: Balance   Walk Assist Level Close Supervision   Findings decrease stride length B LEs   cues for pt to look up to keep eye on surroundings   Wheel 50 Feet with Two Turns   Reason if not Attempted Activity not applicable   Wheel 50 Feet with Two Turns CARE Score 9   Wheel 150 Feet   Reason if not Attempted Activity not applicable   Wheel 602 Feet CARE Score 9   Wheelchair mobility   Does the patient use a wheelchair? 0  No   Curb or Single Stair   Style negotiated Single stair   Type of Assistance Needed Physical assistance; Adaptive equipment   Amount of Physical Assistance Provided 25%-49%   Comment pt has one step inside home  family installing second handrail    1 Step (Curb) CARE Score 3   4 Steps   Reason if not Attempted Activity not applicable   4 Steps CARE Score 9   12 Steps   Reason if not Attempted Activity not applicable   12 Steps CARE Score 9   Stairs   Type Stairs   # of Steps 2   Weight Bearing Precautions Fall Risk   Assist Devices Bilateral Rail   Findings pt's dtr states she has one stair to perform inside home between living room and going into the bathroom  one handrail currently installed, family to install a second  recommendation for A with step due to LE weakness and safety concerns  pt negotiated 6" step x2 with B HRs   Therapeutic Interventions   Strengthening STS [id number]    seated LAQ and hip flex x20 with 2# AW during rest break   Flexibility B gastroc and HS x3mins   Assessment   Treatment Assessment pt cont to work on household distances and stair training for safe d/c home  pt's dtr concerned with step and will be having second handrail installed  pt and pt's dtr educated on utilizing caregivers and home therapy when perfoming step to decrease fall risk  pt has difficulty advancing foot onto step and needs a boost to ascend  will provide family with HEP to cont with ROM and exs when at home  will cont to work on functional mobility and safety to decrease burden of care at home  Family/Caregiver Present dtr, Roel Rose   Problem List Decreased strength;Decreased range of motion;Decreased endurance; Impaired balance;Decreased mobility   Barriers to Discharge Decreased caregiver support   PT Barriers   Physical Impairment Decreased strength;Decreased endurance; Impaired balance;Decreased mobility;Pain;Decreased skin integrity;Obesity   Functional Limitation Car transfers; Ramp negotiation;Standing;Transfers; Walking   Plan   Treatment/Interventions Functional transfer training;LE strengthening/ROM; Endurance training;Patient/family training;Gait training   Progress Progressing toward goals   Recommendation   Recommendation Home PT; Home with family support   Equipment Recommended Walker   PT Therapy Minutes   PT Time In 1230   PT Time Out 1400   PT Total Time (minutes) 90   PT Mode of treatment - Individual (minutes) 90   PT Mode of treatment - Concurrent (minutes) 0   PT Mode of treatment - Group (minutes) 0   PT Mode of treatment - Co-treat (minutes) 0   PT Mode of Treatment - Total time(minutes) 90 minutes   PT Cumulative Minutes 360   Therapy Time missed   Time missed?  No

## 2019-11-01 NOTE — PLAN OF CARE
Problem: CARDIOVASCULAR - ADULT  Goal: Maintains optimal cardiac output and hemodynamic stability  Description  INTERVENTIONS:  - Monitor I/O, vital signs and rhythm  - Monitor for S/S and trends of decreased cardiac output  - Administer and titrate ordered vasoactive medications to optimize hemodynamic stability  - Assess quality of pulses, skin color and temperature  - Assess for signs of decreased coronary artery perfusion  - Instruct patient to report change in severity of symptoms  Outcome: Progressing  Goal: Absence of cardiac dysrhythmias or at baseline rhythm  Description  INTERVENTIONS:  - Continuous cardiac monitoring, vital signs, obtain 12 lead EKG if ordered  - Administer antiarrhythmic and heart rate control medications as ordered  - Monitor electrolytes and administer replacement therapy as ordered  Outcome: Progressing     Problem: RESPIRATORY - ADULT  Goal: Achieves optimal ventilation and oxygenation  Description  INTERVENTIONS:  - Assess for changes in respiratory status  - Assess for changes in mentation and behavior  - Position to facilitate oxygenation and minimize respiratory effort  - Oxygen administered by appropriate delivery if ordered  - Initiate smoking cessation education as indicated  - Encourage broncho-pulmonary hygiene including cough, deep breathe, Incentive Spirometry  - Assess the need for suctioning and aspirate as needed  - Assess and instruct to report SOB or any respiratory difficulty  - Respiratory Therapy support as indicated  Outcome: Progressing     Problem: GASTROINTESTINAL - ADULT  Goal: Maintains adequate nutritional intake  Description  INTERVENTIONS:  - Monitor percentage of each meal consumed  - Identify factors contributing to decreased intake, treat as appropriate  - Assist with meals as needed  - Monitor I&O, weight, and lab values if indicated  - Obtain nutrition services referral as needed  Outcome: Progressing     Problem: METABOLIC, FLUID AND ELECTROLYTES - ADULT  Goal: Electrolytes maintained within normal limits  Description  INTERVENTIONS:  - Monitor labs and assess patient for signs and symptoms of electrolyte imbalances  - Administer electrolyte replacement as ordered  - Monitor response to electrolyte replacements, including repeat lab results as appropriate  - Instruct patient on fluid and nutrition as appropriate  Outcome: Progressing  Goal: Fluid balance maintained  Description  INTERVENTIONS:  - Monitor labs   - Monitor I/O and WT  - Instruct patient on fluid and nutrition as appropriate  - Assess for signs & symptoms of volume excess or deficit  Outcome: Progressing  Goal: Glucose maintained within target range  Description  INTERVENTIONS:  - Monitor Blood Glucose as ordered  - Assess for signs and symptoms of hyperglycemia and hypoglycemia  - Administer ordered medications to maintain glucose within target range  - Assess nutritional intake and initiate nutrition service referral as needed  Outcome: Progressing     Problem: SKIN/TISSUE INTEGRITY - ADULT  Goal: Skin integrity remains intact  Description  INTERVENTIONS  - Identify patients at risk for skin breakdown  - Assess and monitor skin integrity  - Assess and monitor nutrition and hydration status  - Monitor labs (i e  albumin)  - Assess for incontinence   - Turn and reposition patient  - Assist with mobility/ambulation  - Relieve pressure over bony prominences  - Avoid friction and shearing  - Provide appropriate hygiene as needed including keeping skin clean and dry  - Evaluate need for skin moisturizer/barrier cream  - Collaborate with interdisciplinary team (i e  Nutrition, Rehabilitation, etc )   - Patient/family teaching  Outcome: Progressing  Goal: Oral mucous membranes remain intact  Description  INTERVENTIONS  - Assess oral mucosa and hygiene practices  - Implement preventative oral hygiene regimen  - Implement oral medicated treatments as ordered  - Initiate Nutrition services referral as needed  Outcome: Progressing     Problem: MUSCULOSKELETAL - ADULT  Goal: Maintain or return mobility to safest level of function  Description  INTERVENTIONS:  - Assess patient's ability to carry out ADLs; assess patient's baseline for ADL function and identify physical deficits which impact ability to perform ADLs (bathing, care of mouth/teeth, toileting, grooming, dressing, etc )  - Assess/evaluate cause of self-care deficits   - Assess range of motion  - Assess patient's mobility  - Assess patient's need for assistive devices and provide as appropriate  - Encourage maximum independence but intervene and supervise when necessary  - Involve family in performance of ADLs  - Assess for home care needs following discharge   - Consider OT consult to assist with ADL evaluation and planning for discharge  - Provide patient education as appropriate  Outcome: Progressing  Goal: Maintain proper alignment of affected body part  Description  INTERVENTIONS:  - Support, maintain and protect limb and body alignment  - Provide patient/ family with appropriate education  Outcome: Progressing     Problem: COPING  Goal: Pt/Family able to verbalize concerns and demonstrate effective coping strategies  Description  INTERVENTIONS:  - Assist patient/family to identify coping skills, available support systems and cultural and spiritual values  - Provide emotional support, including active listening and acknowledgement of concerns of patient and caregivers  - Reduce environmental stimuli, as able  - Provide patient education  - Assess for spiritual pain/suffering and initiate spiritual care, including notification of Pastoral Care or [place] based community as needed  - Assess effectiveness of coping strategies  Outcome: Progressing  Goal: Will report anxiety at manageable levels  Description  INTERVENTIONS:  - Administer medication as ordered  - Teach and encourage coping skills  - Provide emotional support  - Assess patient/family for anxiety and ability to cope  Outcome: Progressing     Problem: Prexisting or High Potential for Compromised Skin Integrity  Goal: Skin integrity is maintained or improved  Description  INTERVENTIONS:  - Identify patients at risk for skin breakdown  - Assess and monitor skin integrity  - Assess and monitor nutrition and hydration status  - Monitor labs   - Assess for incontinence   - Turn and reposition patient  - Assist with mobility/ambulation  - Relieve pressure over bony prominences  - Avoid friction and shearing  - Provide appropriate hygiene as needed including keeping skin clean and dry  - Evaluate need for skin moisturizer/barrier cream  - Collaborate with interdisciplinary team   - Patient/family teaching  - Consider wound care consult   Outcome: Progressing     Problem: Potential for Falls  Goal: Patient will remain free of falls  Description  INTERVENTIONS:  - Assess patient frequently for physical needs  -  Identify cognitive and physical deficits and behaviors that affect risk of falls    -  [place] fall precautions as indicated by assessment   - Educate patient/family on patient safety including physical limitations  - Instruct patient to call for assistance with activity based on assessment  - Modify environment to reduce risk of injury  - Consider OT/PT consult to assist with strengthening/mobility  Outcome: Progressing     Problem: PAIN - ADULT  Goal: Verbalizes/displays adequate comfort level or baseline comfort level  Description  Interventions:  - Encourage patient to monitor pain and request assistance  - Assess pain using appropriate pain scale  - Administer analgesics based on type and severity of pain and evaluate response  - Implement non-pharmacological measures as appropriate and evaluate response  - Consider cultural and social influences on pain and pain management  - Notify physician/advanced practitioner if interventions unsuccessful or patient reports new pain  Outcome: Progressing

## 2019-11-01 NOTE — ASSESSMENT & PLAN NOTE
Bioprosthetic valve  Last echo was 10/21/2019  Showing moderate left ventricle systolic dysfunction with an EF 41%  Severe hypokinesis to akinesis in the distal half of the anterior, lateral and inferior walls with akinesis of the distal 3rd of the septum  The apex is akinetic to paradoxical  The  base of the heart contracts vigorously  Although these findings could be secondary to coronary artery disease, they also are consistent with Takotsubo's syndrome  Grade 2 left ventricle diastolic dysfunction  Moderate to severe MR  Calcified tricuspid aortic valve  Moderate TR    Mild CO

## 2019-11-01 NOTE — PLAN OF CARE
Problem: CARDIOVASCULAR - ADULT  Goal: Maintains optimal cardiac output and hemodynamic stability  Description  INTERVENTIONS:  - Monitor I/O, vital signs and rhythm  - Monitor for S/S and trends of decreased cardiac output  - Administer and titrate ordered vasoactive medications to optimize hemodynamic stability  - Assess quality of pulses, skin color and temperature  - Assess for signs of decreased coronary artery perfusion  - Instruct patient to report change in severity of symptoms  Outcome: Progressing  Goal: Absence of cardiac dysrhythmias or at baseline rhythm  Description  INTERVENTIONS:  - Continuous cardiac monitoring, vital signs, obtain 12 lead EKG if ordered  - Administer antiarrhythmic and heart rate control medications as ordered  - Monitor electrolytes and administer replacement therapy as ordered  Outcome: Progressing     Problem: RESPIRATORY - ADULT  Goal: Achieves optimal ventilation and oxygenation  Description  INTERVENTIONS:  - Assess for changes in respiratory status  - Assess for changes in mentation and behavior  - Position to facilitate oxygenation and minimize respiratory effort  - Oxygen administered by appropriate delivery if ordered  - Initiate smoking cessation education as indicated  - Encourage broncho-pulmonary hygiene including cough, deep breathe, Incentive Spirometry  - Assess the need for suctioning and aspirate as needed  - Assess and instruct to report SOB or any respiratory difficulty  - Respiratory Therapy support as indicated  Outcome: Progressing     Problem: GASTROINTESTINAL - ADULT  Goal: Maintains adequate nutritional intake  Description  INTERVENTIONS:  - Monitor percentage of each meal consumed  - Identify factors contributing to decreased intake, treat as appropriate  - Assist with meals as needed  - Monitor I&O, weight, and lab values if indicated  - Obtain nutrition services referral as needed  Outcome: Progressing     Problem: METABOLIC, FLUID AND ELECTROLYTES - ADULT  Goal: Electrolytes maintained within normal limits  Description  INTERVENTIONS:  - Monitor labs and assess patient for signs and symptoms of electrolyte imbalances  - Administer electrolyte replacement as ordered  - Monitor response to electrolyte replacements, including repeat lab results as appropriate  - Instruct patient on fluid and nutrition as appropriate  Outcome: Progressing  Goal: Fluid balance maintained  Description  INTERVENTIONS:  - Monitor labs   - Monitor I/O and WT  - Instruct patient on fluid and nutrition as appropriate  - Assess for signs & symptoms of volume excess or deficit  Outcome: Progressing  Goal: Glucose maintained within target range  Description  INTERVENTIONS:  - Monitor Blood Glucose as ordered  - Assess for signs and symptoms of hyperglycemia and hypoglycemia  - Administer ordered medications to maintain glucose within target range  - Assess nutritional intake and initiate nutrition service referral as needed  Outcome: Progressing     Problem: SKIN/TISSUE INTEGRITY - ADULT  Goal: Skin integrity remains intact  Description  INTERVENTIONS  - Identify patients at risk for skin breakdown  - Assess and monitor skin integrity  - Assess and monitor nutrition and hydration status  - Monitor labs (i e  albumin)  - Assess for incontinence   - Turn and reposition patient  - Assist with mobility/ambulation  - Relieve pressure over bony prominences  - Avoid friction and shearing  - Provide appropriate hygiene as needed including keeping skin clean and dry  - Evaluate need for skin moisturizer/barrier cream  - Collaborate with interdisciplinary team (i e  Nutrition, Rehabilitation, etc )   - Patient/family teaching  Outcome: Progressing  Goal: Oral mucous membranes remain intact  Description  INTERVENTIONS  - Assess oral mucosa and hygiene practices  - Implement preventative oral hygiene regimen  - Implement oral medicated treatments as ordered  - Initiate Nutrition services referral as needed  Outcome: Progressing     Problem: MUSCULOSKELETAL - ADULT  Goal: Maintain or return mobility to safest level of function  Description  INTERVENTIONS:  - Assess patient's ability to carry out ADLs; assess patient's baseline for ADL function and identify physical deficits which impact ability to perform ADLs (bathing, care of mouth/teeth, toileting, grooming, dressing, etc )  - Assess/evaluate cause of self-care deficits   - Assess range of motion  - Assess patient's mobility  - Assess patient's need for assistive devices and provide as appropriate  - Encourage maximum independence but intervene and supervise when necessary  - Involve family in performance of ADLs  - Assess for home care needs following discharge   - Consider OT consult to assist with ADL evaluation and planning for discharge  - Provide patient education as appropriate  Outcome: Progressing  Goal: Maintain proper alignment of affected body part  Description  INTERVENTIONS:  - Support, maintain and protect limb and body alignment  - Provide patient/ family with appropriate education  Outcome: Progressing     Problem: COPING  Goal: Pt/Family able to verbalize concerns and demonstrate effective coping strategies  Description  INTERVENTIONS:  - Assist patient/family to identify coping skills, available support systems and cultural and spiritual values  - Provide emotional support, including active listening and acknowledgement of concerns of patient and caregivers  - Reduce environmental stimuli, as able  - Provide patient education  - Assess for spiritual pain/suffering and initiate spiritual care, including notification of Pastoral Care or [place] based community as needed  - Assess effectiveness of coping strategies  Outcome: Progressing  Goal: Will report anxiety at manageable levels  Description  INTERVENTIONS:  - Administer medication as ordered  - Teach and encourage coping skills  - Provide emotional support  - Assess patient/family for anxiety and ability to cope  Outcome: Progressing     Problem: Prexisting or High Potential for Compromised Skin Integrity  Goal: Skin integrity is maintained or improved  Description  INTERVENTIONS:  - Identify patients at risk for skin breakdown  - Assess and monitor skin integrity  - Assess and monitor nutrition and hydration status  - Monitor labs   - Assess for incontinence   - Turn and reposition patient  - Assist with mobility/ambulation  - Relieve pressure over bony prominences  - Avoid friction and shearing  - Provide appropriate hygiene as needed including keeping skin clean and dry  - Evaluate need for skin moisturizer/barrier cream  - Collaborate with interdisciplinary team   - Patient/family teaching  - Consider wound care consult   Outcome: Progressing     Problem: Potential for Falls  Goal: Patient will remain free of falls  Description  INTERVENTIONS:  - Assess patient frequently for physical needs  -  Identify cognitive and physical deficits and behaviors that affect risk of falls    -  [place] fall precautions as indicated by assessment   - Educate patient/family on patient safety including physical limitations  - Instruct patient to call for assistance with activity based on assessment  - Modify environment to reduce risk of injury  - Consider OT/PT consult to assist with strengthening/mobility  Outcome: Progressing     Problem: PAIN - ADULT  Goal: Verbalizes/displays adequate comfort level or baseline comfort level  Description  Interventions:  - Encourage patient to monitor pain and request assistance  - Assess pain using appropriate pain scale  - Administer analgesics based on type and severity of pain and evaluate response  - Implement non-pharmacological measures as appropriate and evaluate response  - Consider cultural and social influences on pain and pain management  - Notify physician/advanced practitioner if interventions unsuccessful or patient reports new pain  Outcome: Progressing

## 2019-11-01 NOTE — ASSESSMENT & PLAN NOTE
No CPAP at home  Last sleep study was approx 5 years ago  Did not wear home CPAP  Was wearing during hospitalization  Patient had an overnight pulse oximetry on 10/28 on 2 L nasal cannula  Patient's pulse oximetry was 90-99% 86 6% of the time and 100% 13 4% of the time  Continue 2L NC @ night  Discussed with Dr Ady Santos will be planning on noctural POX Sunday night on RA for approval for home O2   Patient is also being followed by pulmonology now and will need to follow up outpatient for sleep study for home CPAP  Reviewed pulm consult

## 2019-11-01 NOTE — PLAN OF CARE
Problem: CARDIOVASCULAR - ADULT  Goal: Maintains optimal cardiac output and hemodynamic stability  Description  INTERVENTIONS:  - Monitor I/O, vital signs and rhythm  - Monitor for S/S and trends of decreased cardiac output  - Administer and titrate ordered vasoactive medications to optimize hemodynamic stability  - Assess quality of pulses, skin color and temperature  - Assess for signs of decreased coronary artery perfusion  - Instruct patient to report change in severity of symptoms  Outcome: Progressing  Goal: Absence of cardiac dysrhythmias or at baseline rhythm  Description  INTERVENTIONS:  - Continuous cardiac monitoring, vital signs, obtain 12 lead EKG if ordered  - Administer antiarrhythmic and heart rate control medications as ordered  - Monitor electrolytes and administer replacement therapy as ordered  Outcome: Progressing     Problem: RESPIRATORY - ADULT  Goal: Achieves optimal ventilation and oxygenation  Description  INTERVENTIONS:  - Assess for changes in respiratory status  - Assess for changes in mentation and behavior  - Position to facilitate oxygenation and minimize respiratory effort  - Oxygen administered by appropriate delivery if ordered  - Initiate smoking cessation education as indicated  - Encourage broncho-pulmonary hygiene including cough, deep breathe, Incentive Spirometry  - Assess the need for suctioning and aspirate as needed  - Assess and instruct to report SOB or any respiratory difficulty  - Respiratory Therapy support as indicated  Outcome: Progressing     Problem: GASTROINTESTINAL - ADULT  Goal: Maintains adequate nutritional intake  Description  INTERVENTIONS:  - Monitor percentage of each meal consumed  - Identify factors contributing to decreased intake, treat as appropriate  - Assist with meals as needed  - Monitor I&O, weight, and lab values if indicated  - Obtain nutrition services referral as needed  Outcome: Progressing     Problem: METABOLIC, FLUID AND ELECTROLYTES - ADULT  Goal: Electrolytes maintained within normal limits  Description  INTERVENTIONS:  - Monitor labs and assess patient for signs and symptoms of electrolyte imbalances  - Administer electrolyte replacement as ordered  - Monitor response to electrolyte replacements, including repeat lab results as appropriate  - Instruct patient on fluid and nutrition as appropriate  Outcome: Progressing  Goal: Fluid balance maintained  Description  INTERVENTIONS:  - Monitor labs   - Monitor I/O and WT  - Instruct patient on fluid and nutrition as appropriate  - Assess for signs & symptoms of volume excess or deficit  Outcome: Progressing  Goal: Glucose maintained within target range  Description  INTERVENTIONS:  - Monitor Blood Glucose as ordered  - Assess for signs and symptoms of hyperglycemia and hypoglycemia  - Administer ordered medications to maintain glucose within target range  - Assess nutritional intake and initiate nutrition service referral as needed  Outcome: Progressing     Problem: SKIN/TISSUE INTEGRITY - ADULT  Goal: Skin integrity remains intact  Description  INTERVENTIONS  - Identify patients at risk for skin breakdown  - Assess and monitor skin integrity  - Assess and monitor nutrition and hydration status  - Monitor labs (i e  albumin)  - Assess for incontinence   - Turn and reposition patient  - Assist with mobility/ambulation  - Relieve pressure over bony prominences  - Avoid friction and shearing  - Provide appropriate hygiene as needed including keeping skin clean and dry  - Evaluate need for skin moisturizer/barrier cream  - Collaborate with interdisciplinary team (i e  Nutrition, Rehabilitation, etc )   - Patient/family teaching  Outcome: Progressing  Goal: Oral mucous membranes remain intact  Description  INTERVENTIONS  - Assess oral mucosa and hygiene practices  - Implement preventative oral hygiene regimen  - Implement oral medicated treatments as ordered  - Initiate Nutrition services referral as needed  Outcome: Progressing     Problem: MUSCULOSKELETAL - ADULT  Goal: Maintain or return mobility to safest level of function  Description  INTERVENTIONS:  - Assess patient's ability to carry out ADLs; assess patient's baseline for ADL function and identify physical deficits which impact ability to perform ADLs (bathing, care of mouth/teeth, toileting, grooming, dressing, etc )  - Assess/evaluate cause of self-care deficits   - Assess range of motion  - Assess patient's mobility  - Assess patient's need for assistive devices and provide as appropriate  - Encourage maximum independence but intervene and supervise when necessary  - Involve family in performance of ADLs  - Assess for home care needs following discharge   - Consider OT consult to assist with ADL evaluation and planning for discharge  - Provide patient education as appropriate  Outcome: Progressing  Goal: Maintain proper alignment of affected body part  Description  INTERVENTIONS:  - Support, maintain and protect limb and body alignment  - Provide patient/ family with appropriate education  Outcome: Progressing     Problem: COPING  Goal: Pt/Family able to verbalize concerns and demonstrate effective coping strategies  Description  INTERVENTIONS:  - Assist patient/family to identify coping skills, available support systems and cultural and spiritual values  - Provide emotional support, including active listening and acknowledgement of concerns of patient and caregivers  - Reduce environmental stimuli, as able  - Provide patient education  - Assess for spiritual pain/suffering and initiate spiritual care, including notification of Pastoral Care or bianka based community as needed  - Assess effectiveness of coping strategies  Outcome: Progressing  Goal: Will report anxiety at manageable levels  Description  INTERVENTIONS:  - Administer medication as ordered  - Teach and encourage coping skills  - Provide emotional support  - Assess patient/family for anxiety and ability to cope  Outcome: Progressing     Problem: Prexisting or High Potential for Compromised Skin Integrity  Goal: Skin integrity is maintained or improved  Description  INTERVENTIONS:  - Identify patients at risk for skin breakdown  - Assess and monitor skin integrity  - Assess and monitor nutrition and hydration status  - Monitor labs   - Assess for incontinence   - Turn and reposition patient  - Assist with mobility/ambulation  - Relieve pressure over bony prominences  - Avoid friction and shearing  - Provide appropriate hygiene as needed including keeping skin clean and dry  - Evaluate need for skin moisturizer/barrier cream  - Collaborate with interdisciplinary team   - Patient/family teaching  - Consider wound care consult   Outcome: Progressing     Problem: Potential for Falls  Goal: Patient will remain free of falls  Description  INTERVENTIONS:  - Assess patient frequently for physical needs  -  Identify cognitive and physical deficits and behaviors that affect risk of falls    -  [place] fall precautions as indicated by assessment   - Educate patient/family on patient safety including physical limitations  - Instruct patient to call for assistance with activity based on assessment  - Modify environment to reduce risk of injury  - Consider OT/PT consult to assist with strengthening/mobility  Outcome: Progressing     Problem: PAIN - ADULT  Goal: Verbalizes/displays adequate comfort level or baseline comfort level  Description  Interventions:  - Encourage patient to monitor pain and request assistance  - Assess pain using appropriate pain scale  - Administer analgesics based on type and severity of pain and evaluate response  - Implement non-pharmacological measures as appropriate and evaluate response  - Consider cultural and social influences on pain and pain management  - Notify physician/advanced practitioner if interventions unsuccessful or patient reports new pain  Outcome: Progressing anxiety and ability to cope  Outcome: Progressing     Problem: Prexisting or High Potential for Compromised Skin Integrity  Goal: Skin integrity is maintained or improved  Description  INTERVENTIONS:  - Identify patients at risk for skin breakdown  - Assess and monitor skin integrity  - Assess and monitor nutrition and hydration status  - Monitor labs   - Assess for incontinence   - Turn and reposition patient  - Assist with mobility/ambulation  - Relieve pressure over bony prominences  - Avoid friction and shearing  - Provide appropriate hygiene as needed including keeping skin clean and dry  - Evaluate need for skin moisturizer/barrier cream  - Collaborate with interdisciplinary team   - Patient/family teaching  - Consider wound care consult   Outcome: Progressing     Problem: Potential for Falls  Goal: Patient will remain free of falls  Description  INTERVENTIONS:  - Assess patient frequently for physical needs  -  Identify cognitive and physical deficits and behaviors that affect risk of falls    -  [place] fall precautions as indicated by assessment   - Educate patient/family on patient safety including physical limitations  - Instruct patient to call for assistance with activity based on assessment  - Modify environment to reduce risk of injury  - Consider OT/PT consult to assist with strengthening/mobility  Outcome: Progressing     Problem: PAIN - ADULT  Goal: Verbalizes/displays adequate comfort level or baseline comfort level  Description  Interventions:  - Encourage patient to monitor pain and request assistance  - Assess pain using appropriate pain scale  - Administer analgesics based on type and severity of pain and evaluate response  - Implement non-pharmacological measures as appropriate and evaluate response  - Consider cultural and social influences on pain and pain management  - Notify physician/advanced practitioner if interventions unsuccessful or patient reports new pain  Outcome: Progressing Problem: CARDIOVASCULAR - ADULT  Goal: Maintains optimal cardiac output and hemodynamic stability  Description  INTERVENTIONS:  - Monitor I/O, vital signs and rhythm  - Monitor for S/S and trends of decreased cardiac output  - Administer and titrate ordered vasoactive medications to optimize hemodynamic stability  - Assess quality of pulses, skin color and temperature  - Assess for signs of decreased coronary artery perfusion  - Instruct patient to report change in severity of symptoms  Outcome: Progressing     Problem: CARDIOVASCULAR - ADULT  Goal: Maintains optimal cardiac output and hemodynamic stability  Description  INTERVENTIONS:  - Monitor I/O, vital signs and rhythm  - Monitor for S/S and trends of decreased cardiac output  - Administer and titrate ordered vasoactive medications to optimize hemodynamic stability  - Assess quality of pulses, skin color and temperature  - Assess for signs of decreased coronary artery perfusion  - Instruct patient to report change in severity of symptoms  Outcome: Progressing  Goal: Absence of cardiac dysrhythmias or at baseline rhythm  Description  INTERVENTIONS:  - Continuous cardiac monitoring, vital signs, obtain 12 lead EKG if ordered  - Administer antiarrhythmic and heart rate control medications as ordered  - Monitor electrolytes and administer replacement therapy as ordered  Outcome: Progressing     Problem: RESPIRATORY - ADULT  Goal: Achieves optimal ventilation and oxygenation  Description  INTERVENTIONS:  - Assess for changes in respiratory status  - Assess for changes in mentation and behavior  - Position to facilitate oxygenation and minimize respiratory effort  - Oxygen administered by appropriate delivery if ordered  - Initiate smoking cessation education as indicated  - Encourage broncho-pulmonary hygiene including cough, deep breathe, Incentive Spirometry  - Assess the need for suctioning and aspirate as needed  - Assess and instruct to report SOB or any respiratory difficulty  - Respiratory Therapy support as indicated  Outcome: Progressing     Problem: GASTROINTESTINAL - ADULT  Goal: Maintains adequate nutritional intake  Description  INTERVENTIONS:  - Monitor percentage of each meal consumed  - Identify factors contributing to decreased intake, treat as appropriate  - Assist with meals as needed  - Monitor I&O, weight, and lab values if indicated  - Obtain nutrition services referral as needed  Outcome: Progressing     Problem: METABOLIC, FLUID AND ELECTROLYTES - ADULT  Goal: Electrolytes maintained within normal limits  Description  INTERVENTIONS:  - Monitor labs and assess patient for signs and symptoms of electrolyte imbalances  - Administer electrolyte replacement as ordered  - Monitor response to electrolyte replacements, including repeat lab results as appropriate  - Instruct patient on fluid and nutrition as appropriate  Outcome: Progressing  Goal: Fluid balance maintained  Description  INTERVENTIONS:  - Monitor labs   - Monitor I/O and WT  - Instruct patient on fluid and nutrition as appropriate  - Assess for signs & symptoms of volume excess or deficit  Outcome: Progressing  Goal: Glucose maintained within target range  Description  INTERVENTIONS:  - Monitor Blood Glucose as ordered  - Assess for signs and symptoms of hyperglycemia and hypoglycemia  - Administer ordered medications to maintain glucose within target range  - Assess nutritional intake and initiate nutrition service referral as needed  Outcome: Progressing     Problem: SKIN/TISSUE INTEGRITY - ADULT  Goal: Skin integrity remains intact  Description  INTERVENTIONS  - Identify patients at risk for skin breakdown  - Assess and monitor skin integrity  - Assess and monitor nutrition and hydration status  - Monitor labs (i e  albumin)  - Assess for incontinence   - Turn and reposition patient  - Assist with mobility/ambulation  - Relieve pressure over bony prominences  - Avoid friction and shearing  - Provide appropriate hygiene as needed including keeping skin clean and dry  - Evaluate need for skin moisturizer/barrier cream  - Collaborate with interdisciplinary team (i e  Nutrition, Rehabilitation, etc )   - Patient/family teaching  Outcome: Progressing  Goal: Oral mucous membranes remain intact  Description  INTERVENTIONS  - Assess oral mucosa and hygiene practices  - Implement preventative oral hygiene regimen  - Implement oral medicated treatments as ordered  - Initiate Nutrition services referral as needed  Outcome: Progressing     Problem: MUSCULOSKELETAL - ADULT  Goal: Maintain or return mobility to safest level of function  Description  INTERVENTIONS:  - Assess patient's ability to carry out ADLs; assess patient's baseline for ADL function and identify physical deficits which impact ability to perform ADLs (bathing, care of mouth/teeth, toileting, grooming, dressing, etc )  - Assess/evaluate cause of self-care deficits   - Assess range of motion  - Assess patient's mobility  - Assess patient's need for assistive devices and provide as appropriate  - Encourage maximum independence but intervene and supervise when necessary  - Involve family in performance of ADLs  - Assess for home care needs following discharge   - Consider OT consult to assist with ADL evaluation and planning for discharge  - Provide patient education as appropriate  Outcome: Progressing  Goal: Maintain proper alignment of affected body part  Description  INTERVENTIONS:  - Support, maintain and protect limb and body alignment  - Provide patient/ family with appropriate education  Outcome: Progressing     Problem: COPING  Goal: Pt/Family able to verbalize concerns and demonstrate effective coping strategies  Description  INTERVENTIONS:  - Assist patient/family to identify coping skills, available support systems and cultural and spiritual values  - Provide emotional support, including active listening and acknowledgement of concerns of patient and caregivers  - Reduce environmental stimuli, as able  - Provide patient education  - Assess for spiritual pain/suffering and initiate spiritual care, including notification of Pastoral Care or bianka based community as needed  - Assess effectiveness of coping strategies  Outcome: Progressing  Goal: Will report anxiety at manageable levels  Description  INTERVENTIONS:  - Administer medication as ordered  - Teach and encourage coping skills  - Provide emotional support  - Assess patient/family for anxiety and ability to cope  Outcome: Progressing     Problem: Prexisting or High Potential for Compromised Skin Integrity  Goal: Skin integrity is maintained or improved  Description  INTERVENTIONS:  - Identify patients at risk for skin breakdown  - Assess and monitor skin integrity  - Assess and monitor nutrition and hydration status  - Monitor labs   - Assess for incontinence   - Turn and reposition patient  - Assist with mobility/ambulation  - Relieve pressure over bony prominences  - Avoid friction and shearing  - Provide appropriate hygiene as needed including keeping skin clean and dry  - Evaluate need for skin moisturizer/barrier cream  - Collaborate with interdisciplinary team   - Patient/family teaching  - Consider wound care consult   Outcome: Progressing     Problem: Potential for Falls  Goal: Patient will remain free of falls  Description  INTERVENTIONS:  - Assess patient frequently for physical needs  -  Identify cognitive and physical deficits and behaviors that affect risk of falls    -  [place] fall precautions as indicated by assessment   - Educate patient/family on patient safety including physical limitations  - Instruct patient to call for assistance with activity based on assessment  - Modify environment to reduce risk of injury  - Consider OT/PT consult to assist with strengthening/mobility  Outcome: Progressing     Problem: PAIN - ADULT  Goal: Verbalizes/displays adequate comfort level or baseline comfort level  Description  Interventions:  - Encourage patient to monitor pain and request assistance  - Assess pain using appropriate pain scale  - Administer analgesics based on type and severity of pain and evaluate response  - Implement non-pharmacological measures as appropriate and evaluate response  - Consider cultural and social influences on pain and pain management  - Notify physician/advanced practitioner if interventions unsuccessful or patient reports new pain  Outcome: Progressing     Problem: METABOLIC, FLUID AND ELECTROLYTES - ADULT  Goal: Fluid balance maintained  Description  INTERVENTIONS:  - Monitor labs   - Monitor I/O and WT  - Instruct patient on fluid and nutrition as appropriate  - Assess for signs & symptoms of volume excess or deficit  Outcome: Progressing     Problem: METABOLIC, FLUID AND ELECTROLYTES - ADULT  Goal: Electrolytes maintained within normal limits  Description  INTERVENTIONS:  - Monitor labs and assess patient for signs and symptoms of electrolyte imbalances  - Administer electrolyte replacement as ordered  - Monitor response to electrolyte replacements, including repeat lab results as appropriate  - Instruct patient on fluid and nutrition as appropriate  Outcome: Progressing     Problem: GASTROINTESTINAL - ADULT  Goal: Maintains adequate nutritional intake  Description  INTERVENTIONS:  - Monitor percentage of each meal consumed  - Identify factors contributing to decreased intake, treat as appropriate  - Assist with meals as needed  - Monitor I&O, weight, and lab values if indicated  - Obtain nutrition services referral as needed  Outcome: Progressing     Problem: RESPIRATORY - ADULT  Goal: Achieves optimal ventilation and oxygenation  Description  INTERVENTIONS:  - Assess for changes in respiratory status  - Assess for changes in mentation and behavior  - Position to facilitate oxygenation and minimize respiratory effort  - Oxygen administered by appropriate delivery if ordered  - Initiate smoking cessation education as indicated  - Encourage broncho-pulmonary hygiene including cough, deep breathe, Incentive Spirometry  - Assess the need for suctioning and aspirate as needed  - Assess and instruct to report SOB or any respiratory difficulty  - Respiratory Therapy support as indicated  Outcome: Progressing     Problem: CARDIOVASCULAR - ADULT  Goal: Absence of cardiac dysrhythmias or at baseline rhythm  Description  INTERVENTIONS:  - Continuous cardiac monitoring, vital signs, obtain 12 lead EKG if ordered  - Administer antiarrhythmic and heart rate control medications as ordered  - Monitor electrolytes and administer replacement therapy as ordered  Outcome: Progressing     Problem: CARDIOVASCULAR - ADULT  Goal: Maintains optimal cardiac output and hemodynamic stability  Description  INTERVENTIONS:  - Monitor I/O, vital signs and rhythm  - Monitor for S/S and trends of decreased cardiac output  - Administer and titrate ordered vasoactive medications to optimize hemodynamic stability  - Assess quality of pulses, skin color and temperature  - Assess for signs of decreased coronary artery perfusion  - Instruct patient to report change in severity of symptoms  Outcome: Progressing

## 2019-11-01 NOTE — ASSESSMENT & PLAN NOTE
Reviewed Nephrology note  Patient with a baseline creatinine of 1 5-1 6  Her torsemide was reduced secondary to increasing renal function  She does need to have been repeat renal artery ultrasound secondary to history of renal artery stenosis  It was attempted to be completed during his recent hospitalization but was unable to be secondary to discomfort  Torsemide was increased by Dr Bala Short to 40mg daily  Creatinine is continuing to improve  1 95--> 2 08 --> 1 99 --> 1 84 --> 1 76 --> 1 58    Repeat BMP in am

## 2019-11-01 NOTE — ASSESSMENT & PLAN NOTE
Her hemoglobin did drop from 8 1-->7 6  Patient was experiencing some mild shortness of breath and transfused for symptomatic anemia  Patient is status post transfusion 10/30 she received 1 unit of PRBCs     Hgb 10/31 9 3    Continue to transfuse with hemoglobin < 7 0

## 2019-11-01 NOTE — ASSESSMENT & PLAN NOTE
Weight (last 2 days)     Date/Time   Weight    11/01/19 0600   83 1 (183 2)    10/31/19 0600   83 (182 98)    10/30/19 0600   83 8 (184 75)            Patient with acute on chronic diastolic CHF  She did require BiPAP, IV diuresis during her ICU stay  Patient did diurese well  Patient appears euvolemic today  She will continue her torsemide 20 mg daily  This was reduced secondary to worsening bicarb and renal function  Do need to continue to follow closely  Recommend daily weights with the same scale  Weights have been on different scales and therefore unreliable - discussed with nursing to have same scale daily  Last echo was 10/21/2019  Showing moderate left ventricle systolic dysfunction with an EF 41%  Severe hypokinesis to akinesis in the distal half of the anterior, lateral and inferior walls with akinesis of the distal 3rd of the septum  The apex is akinetic to paradoxical  The  base of the heart contracts vigorously  Although these findings could be secondary to coronary artery disease, they also are consistent with Takotsubo's syndrome  Grade 2 left ventricle diastolic dysfunction  Moderate to severe MR  Calcified tricuspid aortic valve  Moderate TR  Mild MO    Mild bilateral lower extremity edema  Discussed with patient continue compression stockings and elevation of lower extremities  She is having some SOB, HERNANDEZ this am   Started after waking and walking to bathroom  She was given her albuterol inhaler and placed on 2L NC that did help some  However she still reports SOB at rest   Noted she had an increase in her torsemide to 30mg by Dr Luanne Rubin yesterday and did receive the higher dose this am   Reviewed Dr Tiff Krishnan note from yesterday and stated would start imdur 30mg daily, however order not placed at this time  Will add on imdur 30mg daily  Pt is noted to have HERNANDEZ, even with mild exertion  She does have diffuse wheezing, no crackles noted  Start albuterol neb    Her BMP is still pending from this am   Called lab and results completed  Creat is 1 76  Discussed case with Dr Papito Cox and will give dose of IV lasix now 20mg  Hold on CXR for now  D/w nursing as well

## 2019-11-01 NOTE — PROGRESS NOTES
Progress Note - Candice Copeland 1934, 80 y o  female MRN: [id number]    Unit/Bed#: [facility] 268-02 Encounter: [id number]    Primary Care Provider: Honey Galan DO   Date and time admitted to hospital: 10/28/2019  1:22 PM    Acute on chronic diastolic heart failure [facility])  Assessment & Plan  Weight (last 2 days)     Date/Time   Weight    11/01/19 0600   83 1 (183 2)    10/31/19 0600   83 (182 98)    10/30/19 0600   83 8 (184 75)            Patient with acute on chronic diastolic CHF  She did require BiPAP, IV diuresis during her ICU stay  Patient did diurese well  Patient appears euvolemic today  She will continue her torsemide 20 mg daily  This was reduced secondary to worsening bicarb and renal function  Do need to continue to follow closely  Recommend daily weights with the same scale  Weights have been on different scales and therefore unreliable - discussed with nursing to have same scale daily  Last echo was 10/21/2019  Showing moderate left ventricle systolic dysfunction with an EF 41%  Severe hypokinesis to akinesis in the distal half of the anterior, lateral and inferior walls with akinesis of the distal 3rd of the septum  The apex is akinetic to paradoxical  The  base of the heart contracts vigorously  Although these findings could be secondary to coronary artery disease, they also are consistent with Takotsubo's syndrome  Grade 2 left ventricle diastolic dysfunction  Moderate to severe MR  Calcified tricuspid aortic valve  Moderate TR  Mild GA    Mild bilateral lower extremity edema  Discussed with patient continue compression stockings and elevation of lower extremities  She is having some SOB, HERNANDEZ this am   Started after waking and walking to bathroom  She was given her albuterol inhaler and placed on 2L NC that did help some    However she still reports SOB at rest   Noted she had an increase in her torsemide to 30mg by Dr Ruffus Hammans yesterday and did receive the higher dose this am  Reviewed Dr Maya Going note from yesterday and stated would start imdur 30mg daily, however order not placed at this time  Will add on imdur 30mg daily  Pt is noted to have HERNANDEZ, even with mild exertion  She does have diffuse wheezing, no crackles noted  Start albuterol neb  Her BMP is still pending from this am   Called lab and results completed  Creat is 1 76  Discussed case with Dr Kalee Ross and will give dose of IV lasix now 20mg  Hold on CXR for now  D/w nursing as well  Repeat BMP in am    Chronic anemia  Assessment & Plan  Her hemoglobin did drop from 8 1-->7 6  Patient was experiencing some mild shortness of breath and transfused for symptomatic anemia  Patient is status post transfusion 10/30 she received 1 unit of PRBCs     Hgb 10/31 9 3  Continue to transfuse with hemoglobin < 7 0     Acute-on-chronic kidney injury [facility])  Assessment & Plan  Reviewed Nephrology note  Patient with a baseline creatinine of 1 5-1 6  Her torsemide was reduced secondary to increasing renal function  She does need to have been repeat renal artery ultrasound secondary to history of renal artery stenosis  It was attempted to be completed during his recent hospitalization but was unable to be secondary to discomfort  Torsemide was increased yesterday by Dr Kalee Ross to 30mg daily  Creatinine has slightly decreased  1 95--> 2 08 --> 1 99 --> 1 84 --> 1 76  Renal artery stenosis [facility])  Assessment & Plan  Due for repeat renal u/s  Was unable to be completed during hospitalization secondary to patient discomfort  Can be coordinated as OP    Type 2 MI (myocardial infarction) [facility])  Assessment & Plan  Secondary to demand ischemia due to acute on chronic CHF exacerbation    Imdur started    Controlled type 2 diabetes mellitus with diabetic neuropathy, without long-term current use of insulin [facility])  Assessment & Plan  Lab Results   Component Value Date    HGBA1C 5 0 08/16/2019       No results for input(s): POCGLU in the last 72 hours  Blood Sugar Average: Last 72 hrs:      blood sugars were well controlled during her hospitalization  Her last A1c was 5 0  She did not require any insulin during hospitalization  Recommend diabetic diet    Hyperlipidemia  Assessment & Plan  No statin secondary to significant myalgias  Ideally patient should be on a statin given her hx  Last lipid panel 10/23/2019: Total cholesterol 144  Triglycerides 223  HDL 33  LDL 66  AZUL (obstructive sleep apnea)  Assessment & Plan  No CPAP at home, new since recent admissions  Discussed with Dr Karishma Cade, will get pulmonology involved and coordinate with nocturnal pulse ox to help with approval for home CPAP  Reviewed results of study for last p m  Kate Ada Patient was on 2 L nasal cannula  Patient's pulse oximetry was 90-99% 86 6% of the time and 100% 13 4% of the time  Continue 2L NC @ night  Discussed with Dr Karishma Cade will be planning on noctural POX Sunday night on RA for approval for home O2    Hyponatremia  Assessment & Plan  Sodium is stable at this time  Mild decrease and asymptomatic  Likely due to decreased free water clearance      History of aortic valve replacement  Assessment & Plan  Bioprosthetic valve  Last echo was 10/21/2019  Showing moderate left ventricle systolic dysfunction with an EF 41%  Severe hypokinesis to akinesis in the distal half of the anterior, lateral and inferior walls with akinesis of the distal 3rd of the septum  The apex is akinetic to paradoxical  The  base of the heart contracts vigorously  Although these findings could be secondary to coronary artery disease, they also are consistent with Takotsubo's syndrome  Grade 2 left ventricle diastolic dysfunction  Moderate to severe MR  Calcified tricuspid aortic valve  Moderate TR  Mild WI    Depression  Assessment & Plan  Stable    Continue sertraline 25 mg daily    Essential hypertension  Assessment & Plan  Some elevation in blood pressure this morning  Continue metoprolol 25 mg b i d  Torsemide increased yesterday to 30 mg daily  imdur to be added    H/O: CVA (cerebrovascular accident)  Assessment & Plan  Plavix resumed    Hypothyroidism  Assessment & Plan   Continue levothyroxine 112 mcg daily  TSH within normal limits 10/21/2019    * SDH (subdural hematoma) [facility])  Assessment & Plan  Secondary to a fall 10/8  Patient was admitted to [facility] 10/8 through 10/10  She was evaluated by Neurosurgery who recommended conservative management of SDH  VTE Pharmacologic Prophylaxis:   Pharmacologic: Heparin  Mechanical VTE Prophylaxis in Place: Yes    Current Length of Stay: 4 day(s)    Current Patient Status: Inpatient Rehab     Discharge Plan: As per treatment team    Code Status: Level 1 - Full Code    Subjective:   Discussed with nursing  Pt has been experiencing SOB starting this am       Objective:     Vitals:   Temp (24hrs), Av 8 °F (37 1 °C), Min:98 7 °F (37 1 °C), Max:98 8 °F (37 1 °C)    Temp:  [98 7 °F (37 1 °C)-98 8 °F (37 1 °C)] 98 7 °F (37 1 °C)  HR:  [66-73] 73  Resp:  [20] 20  BP: (125-163)/(58-69) 138/61  SpO2:  [95 %-98 %] 98 %  Body mass index is 37 kg/m²  Review of Systems   Constitutional: Positive for fatigue  Negative for appetite change, chills, fever and unexpected weight change  Respiratory: Positive for shortness of breath  Negative for cough, chest tightness and wheezing  Cardiovascular: Positive for leg swelling  Negative for chest pain and palpitations  Gastrointestinal: Negative for abdominal pain, constipation, diarrhea, nausea and vomiting  Musculoskeletal: Positive for arthralgias (posterior shoulder) and back pain  Negative for myalgias  Neurological: Negative for dizziness, syncope, light-headedness and headaches         + restless leg   Psychiatric/Behavioral: Positive for sleep disturbance (related to restless leg)  Input and Output Summary (last 24 hours):        Intake/Output Summary (Last 24 hours) at 11/1/2019 1116  Last data filed at 10/31/2019 1700  Gross per 24 hour   Intake 230 ml   Output --   Net 230 ml     Physical Exam:     Physical Exam   Constitutional: She is oriented to person, place, and time  She appears well-developed and well-nourished  No distress  Obese   Neck: No JVD present  Cardiovascular: Normal rate, regular rhythm and normal heart sounds  No murmur heard  +1 B/L LE edema  Pulmonary/Chest: Effort normal  No respiratory distress  She has wheezes (diffuse)  She has no rales  2L NC   Neurological: She is alert and oriented to person, place, and time  No focal deficits   Skin: Skin is warm and dry  Psychiatric: She has a normal mood and affect  Her behavior is normal  Judgment and thought content normal    Nursing note and vitals reviewed        Additional Data:     Labs:    Results from last 7 days   Lab Units 10/31/19  1300 10/30/19  0538   WBC Thousand/uL 5 90 5 20   HEMOGLOBIN g/dL 9 3* 7 6*   HEMATOCRIT % 27 4* 23 0*   PLATELETS Thousands/uL 205 197   LYMPHO PCT %  --  21*   MONO PCT %  --  9   EOS PCT %  --  3     Results from last 7 days   Lab Units 11/01/19  0729   SODIUM mmol/L 135*   POTASSIUM mmol/L 4 1   CHLORIDE mmol/L 91*   CO2 mmol/L 34*   BUN mg/dL 68*   CREATININE mg/dL 1 76*   ANION GAP mmol/L 10   CALCIUM mg/dL 9 5   GLUCOSE RANDOM mg/dL 121*         Results from last 7 days   Lab Units 10/27/19  0606 10/26/19  0556 10/25/19  2028 10/25/19  1538   POC GLUCOSE mg/dl 121 103 154* 130             Labs reviewed  Imaging:  Imaging reviewed  Recent Cultures (last 7 days):       Last 24 Hours Medication List:     Current Facility-Administered Medications:  acetaminophen 650 mg Oral Q6H PRN Anton Shrestha MD   albuterol 2 puff Inhalation Q4H PRN Anton Shrestha MD   albuterol 2 5 mg Nebulization Q6H PRN NATHEN Escamilla   clopidogrel 75 mg Oral Daily Anton Shrestha MD   gabapentin 300 mg Oral TID Alton Pink MD   heparin (porcine) 5,000 Units Subcutaneous Q8H [address] Anton Shrestha MD   isosorbide mononitrate 30 mg Oral Daily NATHEN Escamilla   levothyroxine 112 mcg Oral Daily Anton Shrestha MD   lidocaine 3 patch Topical Daily PRN Anton Shrestha MD   methocarbamol 500 mg Oral Q6H PRN Anton Shrestha MD   metoprolol tartrate 12 5 mg Oral Q12H [address] Anton Shrestha MD   nystatin  Topical BID Anton Shrestha MD   pantoprazole 20 mg Oral Daily Before Breakfast Peter Littlejohn MD   polyethylene glycol 17 g Oral Daily PRN Ashwin Sims MD   sertraline 25 mg Oral HS Anton Shrestha MD   torsemide 30 mg Oral Daily Peter Littlejohn MD   trolamine salicylate 1 application Topical 4x Daily Aan NATHEN Guerra      M*Modal software was used to dictate this note  It may contain errors with dictating incorrect words or incorrect spelling  Please contact the provider directly with any questions

## 2019-11-01 NOTE — PROGRESS NOTES
Physical Medicine and Rehabilitation Progress Note  Noel Cabello 80 y o  female MRN: [id number]  Unit/Bed#: [facility] 129-44 Encounter: [id number]    HPI: Noel Cabello is a 80 y o  female who presented to the [address] [place] [address] with generalized weakness  Patient formally been admitted from October 8th and discharged home on October 10th after fall at home  At the time workup was significant for subdural hematoma  Conservative management was recommended that time  Patient then re-presented on 10/12/19 with weakness and gait instability  During the 2nd admission, patient needed up with acute respiratory failure, requiring admission to ICU  Pulmonary function eventually did improve to the point where patient was able to be discharged to the [facility] on 10/19/19  Unfortunately patient's respiratory function decline, patient found to be in significant respiratory distress on October 21st, necessitated discharge back to acute hospital   Patient was found to have [place] [address]  In addition imaging was positive for evidence of overload  Patient was diuresed with torsemide  Eventually pulmonary function improved to the point where patient could return to the [facility]  She was admitted to [facility] on 10/28/19  Chief Complaint: f/u TBI and f/u ambulatory dysfunction    Interval: No acute events overnight  Did have SOB this AM  Torsemide was increased by IM service yesterday  ROS: A 10 point ROS was performed; negative except as noted above  Assessment/Plan:    * SDH (subdural hematoma) ([facility])  Assessment & Plan  · Conservative management was recommended  · Patient with a right subdural hematoma  · Discussed with neurosurgery, cleared for DVT prophylaxis, and plavix  Per service, bleed looks to have resolved  No need for repeat CTOH unless neurologic decline noted       CKD (chronic kidney disease) stage 3, GFR 30-59 ml/min ([facility])  Assessment & Plan  Results from last 7 days   Lab Units 11/01/19  0729 10/31/19  0441 10/30/19  0538   CREATININE mg/dL 1 76* 1 84* 1 99*     · Continue monitoring kidney function via intermittent BMP  · Avoid nephrotoxic meds/NSAIDs  · Nephrology team following, appreciate vinny    Renal artery stenosis [facility])  Assessment & Plan  · Nephrology service is following  · Patient may require repeat ultrasound with pain medications    AZUL (obstructive sleep apnea)  Assessment & Plan  · Continue BiPAP at night  · Discussed with Pulmonology service, Dr Lawrence Yousif; patient will likely not be able to be discharged home on BiPAP unless hypercapneic  May require ABG  Otherwise, will need to repeat overnight pulse ox study on room air to qualify patient for home O2 via NC  Dr Lawrence Yousif will evaluate patient today  Chronic anemia  Assessment & Plan  Results from last 7 days   Lab Units 10/31/19  1300 10/30/19  0538   HEMOGLOBIN g/dL 9 3* 7 6*     · Monitor CBC intermittently   · Transfuse for Hgb <7  · Underwent another transfusion on 10/30, with appropriate response noted in H/h    Hyponatremia  Assessment & Plan  Results from last 7 days   Lab Units 11/01/19  0729 10/31/19  0441 10/30/19  0538   SODIUM mmol/L 135* 134* 133*     · Monitor BMP intermittently    Depression  Assessment & Plan  · Continue sertraline    Acute on chronic diastolic heart failure (HCC)  Assessment & Plan  Wt Readings from Last 3 Encounters:   11/01/19 83 1 kg (183 lb 3 2 oz)   10/28/19 76 kg (167 lb 8 8 oz)   10/27/19 81 9 kg (180 lb 8 9 oz)     · Monitor daily weights  · Torsemide increased to 30mg daily      Essential hypertension  Assessment & Plan  Temp:  [98 7 °F (37 1 °C)-98 8 °F (37 1 °C)] 98 7 °F (37 1 °C)  HR:  [66-73] 73  Resp:  [20] 20  BP: (125-163)/(58-69) 138/61    · Continue Metoprolol   · IM service managing anti-hypertensives, adjusting as needed    H/O: CVA (cerebrovascular accident)  Assessment & Plan  · Plavix was on hold due to bleed  · Discussed with neurosurgery   Patient now cleared to resume  Hypothyroidism  Assessment & Plan  · Continue Synthroid    # Skin  · Encourage regular turning as patient at risk for skin breakdown  · Staff to continue patient education on Q2h turning  · Rehabilitation team to perform skin checks regularly     # Bowel  · Patient reports no constipation  · To ensure regular BMs, bowel regimen consisting of:  colace , dulcolax suppository  and miralax     # Bladder  · Patient voiding spontaneously    # Pain  · Continue tylenol, for max of 3gm daily  # Rehab Psych   · There are no psychological or psychiatric problems identified    # Other  - Diet/Nutrition:        Diet Orders   (From admission, onward)             Start     Ordered    10/28/19 1323  Diet Cardiovascular; Cardiac; Fluid Restriction 1200 ML, Consistent Carbohydrate Diet Level 2 (5 carb servings/75 grams CHO/meal)  Diet effective now     Question Answer Comment   Diet Type Cardiovascular    Cardiac Cardiac    Other Restriction(s): Fluid Restriction 1200 ML    Other Restriction(s): Consistent Carbohydrate Diet Level 2 (5 carb servings/75 grams CHO/meal)    RD to adjust diet per protocol?  Yes        10/28/19 1323              - DVT prophy: Sequential compression device (Venodyne)  and Heparin  - GI ppx: Pantaprazole  - Nausea: None  - Supplements: None  - Sleep: None    Disposition: TBD    CODE: Level 1: Full Code Scheduled Meds:    Current Facility-Administered Medications:  acetaminophen 650 mg Oral Q6H PRN Anton Shrestha MD   albuterol 2 puff Inhalation Q4H PRN Anton Shrestha MD   albuterol 2 5 mg Nebulization Q6H PRN NATHEN Escamilla   clopidogrel 75 mg Oral Daily Anton Shrestha MD   gabapentin 300 mg Oral TID Anton Shrestha MD   heparin (porcine) 5,000 Units Subcutaneous Q8H [address] Anton Shrestha MD   isosorbide mononitrate 30 mg Oral Daily NATHEN Escamilla   levothyroxine 112 mcg Oral Daily Anton Shrestha MD   lidocaine 3 patch Topical Daily PRN Venus Francis MD methocarbamol 500 mg Oral Q6H PRN Anton Shrestha MD   metoprolol tartrate 12 5 mg Oral Q12H [address] Anton Shrestha MD   nystatin  Topical BID Anton Shrestha MD   pantoprazole 20 mg Oral Daily Before Breakfast Analisa Meza MD   polyethylene glycol 17 g Oral Daily PRN Jimbo Hdz MD   sertraline 25 mg Oral HS Anton Shrestha MD   torsemide 30 mg Oral Daily Analisa Meza MD   trolamine salicylate 1 application Topical 4x Daily NATHEN Escamilla        Objective:    Functional Update:  Physical Therapy Occupational Therapy   Weight Bearing Status: Full Weight Bearing  Transfers: Incidental Touching, Minimal Assistance  Bed Mobility: Minimal Assistance  Amulation Distance (ft): 60 feet  Ambulation: Incidental Touching  Assistive Device for Ambulation: Roller Walker  Ramp: Incidental Touching  Assistive Device for Ramp: Roller Walker  Discharge Recommendations: Home with:  [address] with[de-identified] Family Support, Home Physical Therapy, Outpatient Physical Therapy   Eating: Independent  Grooming: Supervision  Bathing: Maximum Assistance  Bathing: Maximum Assistance  Upper Body Dressing: Moderate Assistance  Lower Body Dressing: Maximum Assistance  Toileting: Total Assistance  Tub/Shower Transfer: Moderate Assistance  Toilet Transfer: Moderate Assistance  Cognition: Within Defined Limits  Orientation: Person, Time, Situation, Place       Allergies per EMR    Physical Exam:  Temp:  [98 7 °F (37 1 °C)-98 8 °F (37 1 °C)] 98 7 °F (37 1 °C)  HR:  [66-73] 73  Resp:  [20] 20  BP: (125-163)/(58-69) 138/61  SpO2:  [95 %-98 %] 98 %    General: alert, no apparent distress, cooperative and comfortable  HEENT:  Head: Normocephalic, no lesions, without obvious abnormality  LUNGS:  normal air entry, lungs clear to auscultation  ABDOMEN:  soft, non-tender   Bowel sounds normal  No masses, no organomegaly  EXTREMITIES:  extremities normal, warm and well-perfused; no cyanosis, clubbing, or edema  NEURO:   mental status, speech normal, alert and oriented x3  PSYCH:  Alert and oriented, appropriate affect  Physical examination is otherwise unchanged from previous encounter, except as noted above  Diagnostic Studies: Reviewed, no new imaging  No orders to display     Laboratory: Reviewed    Results from last 7 days   Lab Units 10/31/19  1300 10/30/19  0538   HEMOGLOBIN g/dL 9 3* 7 6*   HEMATOCRIT % 27 4* 23 0*   WBC Thousand/uL 5 90 5 20     Results from last 7 days   Lab Units 11/01/19  0729 10/31/19  0441 10/30/19  0538   BUN mg/dL 68* 68* 72*   SODIUM mmol/L 135* 134* 133*   POTASSIUM mmol/L 4 1 3 9 3 8   CHLORIDE mmol/L 91* 90* 89*   CREATININE mg/dL 1 76* 1 84* 1 99*            ** Please Note: Fluency Direct voice to text software may have been used in the creation of this document   **

## 2019-11-01 NOTE — ASSESSMENT & PLAN NOTE
Elevation in blood pressure this morning, has been fairly controlled  Continue metoprolol 25 mg b i d  Torsemide increased to 40 mg daily starting this am and imdur 30mg added yesterday    Follow BP for now with recent changes

## 2019-11-01 NOTE — ASSESSMENT & PLAN NOTE
Some elevation in blood pressure this morning  Continue metoprolol 25 mg b i d  Torsemide increased yesterday to 30 mg daily    imdur to be added

## 2019-11-01 NOTE — PROGRESS NOTES
11/01/19 1000   Pain Assessment   Pain Assessment 0-10   Pain Score 5   Pain Type Chronic pain   Pain Location Neck   Pain Orientation Bilateral   Pain Radiating Towards shoulders   Pain Descriptors Aching   Pain Frequency Constant/continuous   Pain Onset Ongoing   Clinical Progression Gradually improving   Effect of Pain on Daily Activities limits AROM of neck impacting scanning of env't   Patient's Stated Pain Goal No pain   Hospital Pain Intervention(s) Heat applied; Rest   Response to Interventions tolerated reporting decreased pain in neck 2* to heat   Restrictions/Precautions   Precautions Fall Risk, Lidoderm patch BE CAREFUL OF HEAT   Weight Bearing Restrictions No   ROM Restrictions No   Oral Hygiene   Type of Assistance Needed Physical assistance   Amount of Physical Assistance Provided 25%-49%   Comment Pt requireing CGa to miantain balance while in stance   Oral Hygiene CARE Score 3   Grooming   Able To Initiate Tasks;Brush/Clean Teeth   Limitation Noted In Timeliness   Upper Body Dressing   Type of Assistance Needed Physical assistance   Amount of Physical Assistance Provided Less than 25%   Comment Pt requiring A to adjust brigido of shirt once pt pulled over head   Upper Body Dressing CARE Score 3   Picking Up Object   Type of Assistance Needed Incidental touching   Amount of Physical Assistance Provided Less than 25%   Comment Therapit educated pt on propeor placement of RW while bending to retrieve items from floor with LHR in order to prevent falls  Pt verbalize dudnerstanding ith good carryover of technique and education t/o activity   Picking Up Object CARE Score 3   Dressing/Undressing Clothing   Don UB Clothes Pullover Shirt   Sit to Stand   Type of Assistance Needed Supervision   Amount of Physical Assistance Provided No physical assistance   Comment with RW, requires extra time   Sit to Stand CARE Score 4   Bed-Chair Transfer   Type of Assistance Needed Adaptive equipment; Incidental touching Amount of Physical Assistance Provided Less than 25%   Comment CS with RW   Chair/Bed-to-Chair Transfer CARE Score 3   Transfer Bed/Chair/Wheelchair   Positioning Concerns Skin Integrity   Limitations Noted In Balance; Coordination; Endurance;UE Strength;LE Strength   Adaptive Equipment Roller Walker   Toileting   Able to Sudha down yes, up yes  Able to Manage Clothing Closures Yes   Limitations Noted In Balance; Coordination;UE Strength;LE Strength   Adaptive Equipment Grab Bar   Toilet Transfer   Type of Assistance Needed Incidental touching   Amount of Physical Assistance Provided Less than 25%   Comment Pt placed in front of grab abr next to toilet  Pt able to perform sit<>stand with use of grab bar, requiring inc touching to perform transfer  Pt able to ppull pants down  Toielt transfer performed end of session per pt request prior to lunch  Therapist communicated with Russ Kidd pt was in bathroom and pt educated to ring when don, pt verbalized understanding  Toilet Transfer CARE Score 3   Toilet Transfer   Surface Assessed Raised Toilet   Transfer Technique Stand Pivot   Limitations Noted In Balance; Endurance; Safety;UE Strength;LE Strength   Adaptive Equipment Grab Bar   Functional Standing Tolerance   Time 5mins; 4mins 16sec   Activity functional activity tolerance   Comments Pt engaged in functional stanting tolerance to increase endurance with energy conservation techniques to prevent SOB/fatigue  O2 level checked between activities with O2 94%-99%  Pt engaged in connect 4 activity with unilateral support on RW while in stance  Cognition   Overall Cognitive Status WFL   Arousal/Participation Alert; Cooperative   Attention Within functional limits   Orientation Level Oriented X4   Memory Decreased short term memory   Following Commands Follows multistep commands with increased time or repetition   Additional Activities   Additional Activities   (Neck AROM until pain tolerance)   Additional Activities Comments As per Dr Aldo Dominguez ok to perform slow neck AROM to increase ROM, prevent pain in order to increase scanning of env't  As per Dr Aldo Dominguez, ok to place heat on neck  Dr Aldo Dominguez reporting he will order Lidaderm patch for neck  Therefore, therapist educated pt on importance of reminding staff she has a lidaderm patch and to remove, wipe prior to applying heat in order to prevent skin breakdown, pt verbalized understanding  Pt engaged in 3x10 AROM until pain tolerance with neck rotation to R/L and flex/ext  Pt with no c/o pain/disocmfort during AROM and application of heat  Pt reporting pain decreased after application of heat    Activity Tolerance   Activity Tolerance Patient tolerated treatment well   Medical Staff Made Aware Spoke with RN Leo Choi) on pts O2 stats during therapy session   Assessment   Treatment Assessment Pt engaged in 90mins of skilled OT servies with focus on self-care, standing activity tolerance, dynamic standing balance with item retrievalfrom floor  Pt toelrated tx session well with no c/o SOB/fatigue while in standing activities with O2 stats monitored and varying between 94%-99%  Therapist educated pt on use of pursed lip breathing exercises for fatigue/SOB mngmnt  Pt with good techniques, however, can benefit from continued education 2* to observed to breath from mout and require cues for pursed lip breathing techniques  In addition therapist provided pt with handout from [facility] for dressing stick, therapist educated pt she could use a pool noodle to build up handle to increase functional grasp and tool manipulation  Pt is making steady gains towards goals  Pt can continue to benfit from skilled OT services to increase functional activity tolerance, educate on pursed lip breathing techniques, increase dynamic standing balance, item retrieval with folding walker tray, AROM for neck with heat, to increase I and decrease caegiver burden     Prognosis Good   Problem List Decreased strength;Decreased range of motion;Decreased endurance; Impaired balance;Decreased mobility   Plan   Treatment/Interventions ADL retraining;Functional transfer training; Therapeutic exercise; Endurance training;Cognitive reorientation;Patient/family training   Progress Progressing toward goals   Recommendation   OT Discharge Recommendation 24 hour supervision/assist   OT Therapy Minutes   OT Time In 1000   OT Time Out 1130   OT Total Time (minutes) 90   OT Mode of treatment - Individual (minutes) 90   OT Mode of treatment - Concurrent (minutes) 0   OT Mode of treatment - Group (minutes) 0   OT Mode of treatment - Co-treat (minutes) 0   OT Mode of Treatment - Total time(minutes) 90 minutes   OT Cumulative Minutes 360   Therapy Time missed   Time missed?  No

## 2019-11-01 NOTE — ASSESSMENT & PLAN NOTE
No CPAP at home, new since recent admissions  Discussed with Dr Joyce Rios, will get pulmonology involved and coordinate with nocturnal pulse ox to help with approval for home CPAP  Reviewed results of study for last p m  Mallory Dang Patient was on 2 L nasal cannula  Patient's pulse oximetry was 90-99% 86 6% of the time and 100% 13 4% of the time  Continue 2L NC @ night    Discussed with Dr Joyce Rios will be planning on noctural POX Sunday night on RA for approval for home O2

## 2019-11-01 NOTE — ASSESSMENT & PLAN NOTE
Sodium is stable at this time  Mild decrease and asymptomatic  Likely due to decreased free water clearance    Monitor fluid intake

## 2019-11-02 LAB
ANION GAP SERPL CALCULATED.3IONS-SCNC: 7 MMOL/L (ref 5–14)
BUN SERPL-MCNC: 68 MG/DL (ref 5–25)
CALCIUM SERPL-MCNC: 9.2 MG/DL (ref 8.4–10.2)
CHLORIDE SERPL-SCNC: 91 MMOL/L (ref 97–108)
CO2 SERPL-SCNC: 35 MMOL/L (ref 22–30)
CREAT SERPL-MCNC: 1.58 MG/DL (ref 0.6–1.2)
GFR SERPL CREATININE-BSD FRML MDRD: 30 ML/MIN/1.73SQ M
GLUCOSE SERPL-MCNC: 116 MG/DL (ref 70–99)
POTASSIUM SERPL-SCNC: 4.1 MMOL/L (ref 3.6–5)
SODIUM SERPL-SCNC: 133 MMOL/L (ref 137–147)

## 2019-11-02 PROCEDURE — 94640 AIRWAY INHALATION TREATMENT: CPT

## 2019-11-02 PROCEDURE — 99233 SBSQ HOSP IP/OBS HIGH 50: CPT | Performed by: NURSE PRACTITIONER

## 2019-11-02 PROCEDURE — 97116 GAIT TRAINING THERAPY: CPT

## 2019-11-02 PROCEDURE — 94760 N-INVAS EAR/PLS OXIMETRY 1: CPT

## 2019-11-02 PROCEDURE — 80048 BASIC METABOLIC PNL TOTAL CA: CPT | Performed by: PHYSICAL MEDICINE & REHABILITATION

## 2019-11-02 PROCEDURE — 97530 THERAPEUTIC ACTIVITIES: CPT

## 2019-11-02 PROCEDURE — 97535 SELF CARE MNGMENT TRAINING: CPT

## 2019-11-02 PROCEDURE — 99232 SBSQ HOSP IP/OBS MODERATE 35: CPT | Performed by: INTERNAL MEDICINE

## 2019-11-02 RX ORDER — AMLODIPINE BESYLATE 2.5 MG/1
2.5 TABLET ORAL DAILY
Status: DISCONTINUED | OUTPATIENT
Start: 2019-11-03 | End: 2019-11-02

## 2019-11-02 RX ADMIN — METOPROLOL TARTRATE 12.5 MG: 25 TABLET ORAL at 21:02

## 2019-11-02 RX ADMIN — ISOSORBIDE MONONITRATE 30 MG: 30 TABLET, EXTENDED RELEASE ORAL at 08:49

## 2019-11-02 RX ADMIN — GABAPENTIN 300 MG: 300 CAPSULE ORAL at 21:02

## 2019-11-02 RX ADMIN — HEPARIN SODIUM 5000 UNITS: 5000 INJECTION INTRAVENOUS; SUBCUTANEOUS at 05:59

## 2019-11-02 RX ADMIN — GABAPENTIN 300 MG: 300 CAPSULE ORAL at 16:12

## 2019-11-02 RX ADMIN — TORSEMIDE 40 MG: 20 TABLET ORAL at 08:50

## 2019-11-02 RX ADMIN — LIDOCAINE 2 PATCH: 50 PATCH CUTANEOUS at 08:50

## 2019-11-02 RX ADMIN — METHOCARBAMOL 500 MG: 500 TABLET ORAL at 16:11

## 2019-11-02 RX ADMIN — NYSTATIN: 100000 POWDER TOPICAL at 17:13

## 2019-11-02 RX ADMIN — NYSTATIN: 100000 POWDER TOPICAL at 08:50

## 2019-11-02 RX ADMIN — PANTOPRAZOLE 20 MG: 20 TABLET, DELAYED RELEASE ORAL at 06:01

## 2019-11-02 RX ADMIN — LEVOTHYROXINE SODIUM 112 MCG: 112 TABLET ORAL at 05:58

## 2019-11-02 RX ADMIN — HEPARIN SODIUM 5000 UNITS: 5000 INJECTION INTRAVENOUS; SUBCUTANEOUS at 21:03

## 2019-11-02 RX ADMIN — ACETAMINOPHEN 650 MG: 325 TABLET ORAL at 16:11

## 2019-11-02 RX ADMIN — ACETAMINOPHEN 650 MG: 325 TABLET ORAL at 09:09

## 2019-11-02 RX ADMIN — ALBUTEROL SULFATE 2.5 MG: 2.5 SOLUTION RESPIRATORY (INHALATION) at 09:48

## 2019-11-02 RX ADMIN — CLOPIDOGREL BISULFATE 75 MG: 75 TABLET, FILM COATED ORAL at 08:49

## 2019-11-02 RX ADMIN — ALBUTEROL SULFATE 2 PUFF: 90 AEROSOL, METERED RESPIRATORY (INHALATION) at 17:09

## 2019-11-02 RX ADMIN — TROLAMINE SALICYLATE 1 APPLICATION: 10 CREAM TOPICAL at 14:00

## 2019-11-02 RX ADMIN — SERTRALINE 25 MG: 25 TABLET, FILM COATED ORAL at 21:02

## 2019-11-02 RX ADMIN — METHOCARBAMOL 500 MG: 500 TABLET ORAL at 22:37

## 2019-11-02 RX ADMIN — METHOCARBAMOL 500 MG: 500 TABLET ORAL at 09:09

## 2019-11-02 RX ADMIN — ACETAMINOPHEN 650 MG: 325 TABLET ORAL at 22:37

## 2019-11-02 RX ADMIN — HEPARIN SODIUM 5000 UNITS: 5000 INJECTION INTRAVENOUS; SUBCUTANEOUS at 14:37

## 2019-11-02 RX ADMIN — TROLAMINE SALICYLATE 1 APPLICATION: 10 CREAM TOPICAL at 08:50

## 2019-11-02 RX ADMIN — GABAPENTIN 300 MG: 300 CAPSULE ORAL at 08:49

## 2019-11-02 RX ADMIN — TROLAMINE SALICYLATE 1 APPLICATION: 10 CREAM TOPICAL at 21:03

## 2019-11-02 RX ADMIN — METOPROLOL TARTRATE 12.5 MG: 25 TABLET ORAL at 08:49

## 2019-11-02 NOTE — PROGRESS NOTES
NEPHROLOGY PROGRESS NOTE   Theodora Siemens 80 y o  female MRN: [id number]  Unit/Bed#: [facility] 268-02 Encounter: [id number]  Reason for Consult:  Acute kidney injury    ASSESSMENT/PLAN:  1  Acute kidney injury most likely secondary to hemodynamic fluctuations as well as volume overload  2  Chronic kidney disease stage 3, baseline creatinine 1 5-1 6  3  Hypertension, slightly elevated in the 160s  4  History of cardiomyopathy with ejection fraction 14% with diastolic dysfunction, continue with current diuretic dosing  5  Hyponatremia likely secondary to volume overload    PLAN:  · Overall renal function remains fairly stable  · Blood pressure suboptimal, continue to monitor, previously on amlodipine  · Continue with current diuretic dosing    SUBJECTIVE:  Seen examined  Patient awake alert  Mild shortness of breath with exertion  Complains of wheezing  No significant chest pain or pressure  Denies any nausea vomiting diarrhea  Review of Systems    OBJECTIVE:  Current Weight: Weight - Scale: 83 6 kg (184 lb 4 9 oz)  Vitals:    11/01/19 2100 11/02/19 0600 11/02/19 0730 11/02/19 1612   BP: 141/65  163/70 131/62   BP Location: Right arm  Right arm Right arm   Pulse: 76  65 69   Resp: (!) 24  22 18   Temp: (!) 97 °F (36 1 °C)  97 7 °F (36 5 °C) (!) 97 3 °F (36 3 °C)   TempSrc: Tympanic  Tympanic Tympanic   SpO2: 97%  98% 95%   Weight:  83 6 kg (184 lb 4 9 oz)     Height:           Intake/Output Summary (Last 24 hours) at 11/2/2019 1631  Last data filed at 11/2/2019 1556  Gross per 24 hour   Intake 660 ml   Output 525 ml   Net 135 ml       Physical Exam   Constitutional: She is oriented to person, place, and time  No distress  HENT:   Head: Normocephalic  Neck: Neck supple  Cardiovascular: Normal rate and regular rhythm  Pulmonary/Chest: Effort normal  She has wheezes (Few expiratory wheezes)  Abdominal: Soft  She exhibits no distension  Musculoskeletal: She exhibits no edema     Neurological: She is alert and oriented to person, place, and time  Skin: Skin is warm and dry         Medications:    Current Facility-Administered Medications:     acetaminophen (TYLENOL) tablet 650 mg, 650 mg, Oral, Q6H PRN, Anton Shrestha MD, 650 mg at 11/02/19 1611    albuterol (PROVENTIL HFA,VENTOLIN HFA) inhaler 2 puff, 2 puff, Inhalation, Q4H PRN, Sharon Aj MD, 2 puff at 11/01/19 1658    albuterol inhalation solution 2 5 mg, 2 5 mg, Nebulization, Q6H PRN, NATHEN Escamilla, 2 5 mg at 11/02/19 0948    clopidogrel (PLAVIX) tablet 75 mg, 75 mg, Oral, Daily, Anton Shrestha MD, 75 mg at 11/02/19 0849    gabapentin (NEURONTIN) capsule 300 mg, 300 mg, Oral, TID, Anton Shrestha MD, 300 mg at 11/02/19 1612    heparin (porcine) subcutaneous injection 5,000 Units, 5,000 Units, Subcutaneous, Q8H [facility], Sharon Aj MD, 5,000 Units at 11/02/19 1437    isosorbide mononitrate (IMDUR) 24 hr tablet 30 mg, 30 mg, Oral, Daily, NATHEN Escamilla, 30 mg at 11/02/19 0849    levothyroxine tablet 112 mcg, 112 mcg, Oral, Daily, Anton Shrestha MD, 112 mcg at 11/02/19 0558    lidocaine (LIDODERM) 5 % patch 3 patch, 3 patch, Topical, Daily PRN, Sharon Aj MD, 2 patch at 11/02/19 0850    menthol-methyl salicylate (BENGAY) 66-75 % cream, , Apply externally, 4x Daily PRN, Sharon Aj MD    methocarbamol (ROBAXIN) tablet 500 mg, 500 mg, Oral, Q6H PRN, Anton Shrestha MD, 500 mg at 11/02/19 1611    metoprolol tartrate (LOPRESSOR) partial tablet 12 5 mg, 12 5 mg, Oral, Q12H [facility], Anton Shrestha MD, 12 5 mg at 11/02/19 0849    nystatin (MYCOSTATIN) powder, , Topical, BID, Anton Shrestha MD    pantoprazole (PROTONIX) EC tablet 20 mg, 20 mg, Oral, Daily Before Breakfast, Reece Menjivar MD, 20 mg at 11/02/19 0601    polyethylene glycol (MIRALAX) packet 17 g, 17 g, Oral, Daily PRN, Sharon Aj MD    sertraline (ZOLOFT) tablet 25 mg, 25 mg, Oral, HS, Anton Shrestha MD, 25 mg at 11/01/19 2135    torsemide [facility]) tablet 40 mg, 40 mg, Oral, Daily, Ana White, CRNP, 40 mg at 11/02/19 0850    trolamine salicylate (ASPERCREME) 10 % cream 1 application, 1 application, Topical, 4x Daily, Akosua Smith, CRNP, 1 application at 54/27/59 1468    Laboratory Results:  Results from last 7 days   Lab Units 11/02/19  0553 11/01/19  0729 10/31/19  1300 10/31/19  0441 10/30/19  0538 10/29/19  0525 10/28/19  0433 10/27/19  0446   WBC Thousand/uL  --   --  5 90  --  5 20  --   --   --    HEMOGLOBIN g/dL  --   --  9 3*  --  7 6*  --   --   --    HEMATOCRIT %  --   --  27 4*  --  23 0*  --   --   --    PLATELETS Thousands/uL  --   --  205  --  197  --   --   --    POTASSIUM mmol/L 4 1 4 1  --  3 9 3 8 3 8 3 7 4 0   CHLORIDE mmol/L 91* 91*  --  90* 89* 89* 90* 93*   CO2 mmol/L 35* 34*  --  35* 36* 36* 37* 36*   BUN mg/dL 68* 68*  --  68* 72* 72* 63* 60*   CREATININE mg/dL 1 58* 1 76*  --  1 84* 1 99* 2 08* 1 95* 1 90*   CALCIUM mg/dL 9 2 9 5  --  9 0 8 9 8 8 8 9 9 1   MAGNESIUM mg/dL  --   --   --   --   --   --   --  2 1

## 2019-11-02 NOTE — PROGRESS NOTES
11/02/19 1300   Pain Assessment   Pain Assessment No/denies pain   Restrictions/Precautions   Precautions Fall Risk;Fluid restriction;Hard of hearing   Sit to Stand   Type of Assistance Needed Supervision   Sit to Stand CARE Score 4   Bed-Chair Transfer   Type of Assistance Needed Supervision; Adaptive equipment   Chair/Bed-to-Chair Transfer CARE Score 4   Transfer Bed/Chair/Wheelchair   Limitations Noted In Endurance;Balance;UE Strength;LE Strength   Adaptive Equipment Roller Walker   Walk 10 Feet   Type of Assistance Needed Supervision; Adaptive equipment   Walk 10 Feet CARE Score 4   Walk 50 Feet with Two Turns   Type of Assistance Needed Supervision; Adaptive equipment   Walk 50 Feet with Two Turns CARE Score 4   Walk 150 Feet   Reason if not Attempted Safety concerns   Walk 150 Feet CARE Score 88   Walking 10 Feet on Uneven Surfaces   Type of Assistance Needed Supervision; Adaptive equipment   Comment ramp with RW   Walking 10 Feet on Uneven Surfaces CARE Score 4   Ambulation   Does the patient walk? 2  Yes   Primary Mode of Locomotion Prior to Admission Walk   Distance Walked (feet) 100 ft  (100 x 2)   Assist Device Roller Walker   Gait Pattern Slow Jenny;Decreased foot clearance   Limitations Noted In Strength;Speed; Endurance;Balance   Findings Reports B shoulder pain with wt beraing on RW only when ambulating   Wheelchair mobility   Does the patient use a wheelchair? 0  No   Curb or Single Stair   Style negotiated Curb   Type of Assistance Needed Incidental touching; Adaptive equipment   Amount of Physical Assistance Provided Less than 25%   1 Step (Curb) CARE Score 3   Toilet Transfer   Type of Assistance Needed Supervision   Toilet Transfer CARE Score 4   Toilet Transfer   Surface Assessed Standard Toilet   Assessment   Treatment Assessment Pt continues to required extra time with all tasks  Practiced pursed lip breathing with all activities   Returned to recliner chair, tendency to hold her breath while scooting backward in chair and then noted wheezing  Performing all transfers and walking with RW at supervision level  No LOB noted  Family/Caregiver Present No   PT Therapy Minutes   PT Time In 1300   PT Time Out 1330   PT Total Time (minutes) 30   PT Mode of treatment - Individual (minutes) 30   PT Mode of treatment - Concurrent (minutes) 0   PT Mode of treatment - Group (minutes) 0   PT Mode of treatment - Co-treat (minutes) 0   PT Mode of Treatment - Total time(minutes) 30 minutes   PT Cumulative Minutes 390   Therapy Time missed   Time missed?  No

## 2019-11-02 NOTE — PLAN OF CARE
Problem: CARDIOVASCULAR - ADULT  Goal: Maintains optimal cardiac output and hemodynamic stability  Description  INTERVENTIONS:  - Monitor I/O, vital signs and rhythm  - Monitor for S/S and trends of decreased cardiac output  - Administer and titrate ordered vasoactive medications to optimize hemodynamic stability  - Assess quality of pulses, skin color and temperature  - Assess for signs of decreased coronary artery perfusion  - Instruct patient to report change in severity of symptoms  Outcome: Progressing  Goal: Absence of cardiac dysrhythmias or at baseline rhythm  Description  INTERVENTIONS:  - Continuous cardiac monitoring, vital signs, obtain 12 lead EKG if ordered  - Administer antiarrhythmic and heart rate control medications as ordered  - Monitor electrolytes and administer replacement therapy as ordered  Outcome: Progressing     Problem: RESPIRATORY - ADULT  Goal: Achieves optimal ventilation and oxygenation  Description  INTERVENTIONS:  - Assess for changes in respiratory status  - Assess for changes in mentation and behavior  - Position to facilitate oxygenation and minimize respiratory effort  - Oxygen administered by appropriate delivery if ordered  - Initiate smoking cessation education as indicated  - Encourage broncho-pulmonary hygiene including cough, deep breathe, Incentive Spirometry  - Assess the need for suctioning and aspirate as needed  - Assess and instruct to report SOB or any respiratory difficulty  - Respiratory Therapy support as indicated  Outcome: Progressing     Problem: GASTROINTESTINAL - ADULT  Goal: Maintains adequate nutritional intake  Description  INTERVENTIONS:  - Monitor percentage of each meal consumed  - Identify factors contributing to decreased intake, treat as appropriate  - Assist with meals as needed  - Monitor I&O, weight, and lab values if indicated  - Obtain nutrition services referral as needed  Outcome: Progressing     Problem: METABOLIC, FLUID AND ELECTROLYTES - ADULT  Goal: Electrolytes maintained within normal limits  Description  INTERVENTIONS:  - Monitor labs and assess patient for signs and symptoms of electrolyte imbalances  - Administer electrolyte replacement as ordered  - Monitor response to electrolyte replacements, including repeat lab results as appropriate  - Instruct patient on fluid and nutrition as appropriate  Outcome: Progressing  Goal: Fluid balance maintained  Description  INTERVENTIONS:  - Monitor labs   - Monitor I/O and WT  - Instruct patient on fluid and nutrition as appropriate  - Assess for signs & symptoms of volume excess or deficit  Outcome: Progressing  Goal: Glucose maintained within target range  Description  INTERVENTIONS:  - Monitor Blood Glucose as ordered  - Assess for signs and symptoms of hyperglycemia and hypoglycemia  - Administer ordered medications to maintain glucose within target range  - Assess nutritional intake and initiate nutrition service referral as needed  Outcome: Progressing     Problem: SKIN/TISSUE INTEGRITY - ADULT  Goal: Skin integrity remains intact  Description  INTERVENTIONS  - Identify patients at risk for skin breakdown  - Assess and monitor skin integrity  - Assess and monitor nutrition and hydration status  - Monitor labs (i e  albumin)  - Assess for incontinence   - Turn and reposition patient  - Assist with mobility/ambulation  - Relieve pressure over bony prominences  - Avoid friction and shearing  - Provide appropriate hygiene as needed including keeping skin clean and dry  - Evaluate need for skin moisturizer/barrier cream  - Collaborate with interdisciplinary team (i e  Nutrition, Rehabilitation, etc )   - Patient/family teaching  Outcome: Progressing  Goal: Oral mucous membranes remain intact  Description  INTERVENTIONS  - Assess oral mucosa and hygiene practices  - Implement preventative oral hygiene regimen  - Implement oral medicated treatments as ordered  - Initiate Nutrition services referral as needed  Outcome: Progressing     Problem: MUSCULOSKELETAL - ADULT  Goal: Maintain or return mobility to safest level of function  Description  INTERVENTIONS:  - Assess patient's ability to carry out ADLs; assess patient's baseline for ADL function and identify physical deficits which impact ability to perform ADLs (bathing, care of mouth/teeth, toileting, grooming, dressing, etc )  - Assess/evaluate cause of self-care deficits   - Assess range of motion  - Assess patient's mobility  - Assess patient's need for assistive devices and provide as appropriate  - Encourage maximum independence but intervene and supervise when necessary  - Involve family in performance of ADLs  - Assess for home care needs following discharge   - Consider OT consult to assist with ADL evaluation and planning for discharge  - Provide patient education as appropriate  Outcome: Progressing  Goal: Maintain proper alignment of affected body part  Description  INTERVENTIONS:  - Support, maintain and protect limb and body alignment  - Provide patient/ family with appropriate education  Outcome: Progressing     Problem: COPING  Goal: Pt/Family able to verbalize concerns and demonstrate effective coping strategies  Description  INTERVENTIONS:  - Assist patient/family to identify coping skills, available support systems and cultural and spiritual values  - Provide emotional support, including active listening and acknowledgement of concerns of patient and caregivers  - Reduce environmental stimuli, as able  - Provide patient education  - Assess for spiritual pain/suffering and initiate spiritual care, including notification of Pastoral Care or bianka based community as needed  - Assess effectiveness of coping strategies  Outcome: Progressing  Goal: Will report anxiety at manageable levels  Description  INTERVENTIONS:  - Administer medication as ordered  - Teach and encourage coping skills  - Provide emotional support  - Assess patient/family for anxiety and ability to cope  Outcome: Progressing     Problem: Prexisting or High Potential for Compromised Skin Integrity  Goal: Skin integrity is maintained or improved  Description  INTERVENTIONS:  - Identify patients at risk for skin breakdown  - Assess and monitor skin integrity  - Assess and monitor nutrition and hydration status  - Monitor labs   - Assess for incontinence   - Turn and reposition patient  - Assist with mobility/ambulation  - Relieve pressure over bony prominences  - Avoid friction and shearing  - Provide appropriate hygiene as needed including keeping skin clean and dry  - Evaluate need for skin moisturizer/barrier cream  - Collaborate with interdisciplinary team   - Patient/family teaching  - Consider wound care consult   Outcome: Progressing     Problem: Potential for Falls  Goal: Patient will remain free of falls  Description  INTERVENTIONS:  - Assess patient frequently for physical needs  -  Identify cognitive and physical deficits and behaviors that affect risk of falls    -  Lookout fall precautions as indicated by assessment   - Educate patient/family on patient safety including physical limitations  - Instruct patient to call for assistance with activity based on assessment  - Modify environment to reduce risk of injury  - Consider OT/PT consult to assist with strengthening/mobility  Outcome: Progressing     Problem: PAIN - ADULT  Goal: Verbalizes/displays adequate comfort level or baseline comfort level  Description  Interventions:  - Encourage patient to monitor pain and request assistance  - Assess pain using appropriate pain scale  - Administer analgesics based on type and severity of pain and evaluate response  - Implement non-pharmacological measures as appropriate and evaluate response  - Consider cultural and social influences on pain and pain management  - Notify physician/advanced practitioner if interventions unsuccessful or patient reports new pain  Outcome: Progressing

## 2019-11-02 NOTE — NURSING NOTE
Patient again having wheezing offered the neb treatment again but patient wanted her inhaler stated it works better then the nebulizer told patient if inhaler does not work will have to use nebulizer

## 2019-11-02 NOTE — PROGRESS NOTES
11/02/19 3381   Pain Assessment   Pain Assessment 0-10   Pain Score 6   Pain Type Chronic pain   Pain Location Knee;Neck   Pain Orientation Bilateral   Hospital Pain Intervention(s) Repositioned; Other (Comment); Environmental changes  (OTC lotion applied by nurse)   Restrictions/Precautions   Precautions Fall Risk   Weight Bearing Restrictions No   ROM Restrictions No   Putting On/Taking Off Footwear   Type of Assistance Needed Physical assistance   Amount of Physical Assistance Provided Less than 25%   Comment Pt completes donning/doffing socks while seated at [facility] with leg supported at edge as pt reports completing with this method at home  Pt demonstrates ability to utilize leg  to position legs without FWD flexion and then utilizes dressing stick to doff hospital socks with overall supervision warranted  Pt continues to insist on use of metal sock aide as "It works the best" but again demos decreased endurance and need to complete via FWD trunk flexion  Pt does present SOB, however, SPO2 sats remained above 92% with rest breaks incorporated to focus on pursed lip breathing  Pt may benefit from trialing alternative method for donning socks to promote independence, reduce burden of care, and for energy conservation  Pt able to manage shoes this session with use of shoe horn  Putting On/Taking Off Footwear CARE Score 3   Lying to Sitting on Side of Bed   Type of Assistance Needed Physical assistance   Amount of Physical Assistance Provided Less than 25%   Comment Pt requires increased time and VC's and Deondre for trunk  Pt typically sleeps in recliner  Lying to Sitting on Side of Bed CARE Score 3   Sit to Stand   Type of Assistance Needed Supervision   Amount of Physical Assistance Provided No physical assistance   Comment Increased time for task completion  Sit to Stand CARE Score 4   Bed-Chair Transfer   Type of Assistance Needed Supervision; Adaptive equipment   Amount of Physical Assistance Provided No physical assistance   Comment CS   Chair/Bed-to-Chair Transfer CARE Score 4   Toileting Hygiene   Type of Assistance Needed Incidental touching   Amount of Physical Assistance Provided No physical assistance   Comment CGA for CM  Able to complete loreto hygiene  Toileting Hygiene CARE Score 4   Toilet Transfer   Type of Assistance Needed Supervision   Amount of Physical Assistance Provided No physical assistance   Toilet Transfer CARE Score 4   Cognition   Overall Cognitive Status WFL   Arousal/Participation Alert; Cooperative   Attention Within functional limits   Orientation Level Oriented X4   Memory Decreased short term memory   Following Commands Follows multistep commands with increased time or repetition   Activity Tolerance   Activity Tolerance Patient tolerated treatment well   Assessment   Treatment Assessment Pt participated in skilled OT services with focus on LB dressing, toileting, and functional transfers  Upon initial arrival to pt's room pt was on 2LO2 from overnight  Spoke with RN, Tari Acosta and pt ok to be on RA during the day  SPO2 sats monitored throughout duration of session with pt remaining 92% and above  Pt does continue to present with decreased activity tolerance and endurance and does require frequent rest breaks to manage fatigue  Pt require min VC's for carryover of directions at times and reports "I'm always bad first thing in the morning " Pt will continue to benefit from focus on functional cognition as it relates to ADL function, general endurance/strength, standing balance, and mild cervical stretching/ROM to reduce cervical stiffness  Moist heat appropriate if lidoderm patches are removed and area cleansed  Prognosis Good   Problem List Decreased strength;Decreased range of motion;Decreased endurance; Impaired balance;Decreased mobility   Plan   Treatment/Interventions ADL retraining;Functional transfer training; Therapeutic exercise; Endurance training;Patient/family training; Compensatory technique education   Progress Progressing toward goals   Recommendation   OT Discharge Recommendation 24 hour supervision/assist   OT Therapy Minutes   OT Time In 0730   OT Time Out 0830   OT Total Time (minutes) 60   OT Mode of treatment - Individual (minutes) 60   OT Mode of treatment - Concurrent (minutes) 0   OT Mode of treatment - Group (minutes) 0   OT Mode of treatment - Co-treat (minutes) 0   OT Mode of Treatment - Total time(minutes) 60 minutes   OT Cumulative Minutes 420   Therapy Time missed   Time missed?  No

## 2019-11-02 NOTE — PROGRESS NOTES
Progress Note - Ivy Harris 1934, 80 y o  female MRN: [id number]    Unit/Bed#: [facility] 268-02 Encounter: [id number]    Primary Care Provider: Eileen Ramey DO   Date and time admitted to hospital: 10/28/2019  1:22 PM    Acute on chronic diastolic heart failure [facility])  Assessment & Plan  Weight (last 2 days)     Date/Time   Weight    11/02/19 0600   83 6 (184 3)    11/01/19 0600   83 1 (183 2)    10/31/19 0600   83 (182 98)            Patient with acute on chronic diastolic CHF  She did require BiPAP, IV diuresis during her ICU stay  Recommend daily weights with the same scale  Weights have been on different scales and therefore unreliable - discussed with nursing to have same scale daily  Last echo was 10/21/2019  Showing moderate left ventricle systolic dysfunction with an EF 41%  Severe hypokinesis to akinesis in the distal half of the anterior, lateral and inferior walls with akinesis of the distal 3rd of the septum  The apex is akinetic to paradoxical  The  base of the heart contracts vigorously  Although these findings could be secondary to coronary artery disease, they also are consistent with Takotsubo's syndrome  Grade 2 left ventricle diastolic dysfunction  Moderate to severe MR  Calcified tricuspid aortic valve  Moderate TR  Mild [place]    Trace bilateral lower extremity edema  Discussed with patient continue compression stockings and elevation of lower extremities  She is having some SOB, HERNANDEZ this am - improved from yesterday  She did receive lasix 20mg IV yesterday and torsemide has been titrated up and is now 40mg daily - first dose today  She does c/o wheezing and she is noted to have diffuse wheezing, no crackles  D/W pt and nursing and she will be getting an albuterol neb at this time    Repeat BMP in am    Chronic anemia  Assessment & Plan  Her hemoglobin did drop from 8 1-->7 6  Patient was experiencing some mild shortness of breath and transfused for symptomatic anemia  Patient is status post transfusion 10/30 she received 1 unit of PRBCs     Hgb 10/31 9 3  Continue to transfuse with hemoglobin < 7 0     Acute-on-chronic kidney injury [facility])  Assessment & Plan  Reviewed Nephrology note  Patient with a baseline creatinine of 1 5-1 6  Her torsemide was reduced secondary to increasing renal function  She does need to have been repeat renal artery ultrasound secondary to history of renal artery stenosis  It was attempted to be completed during his recent hospitalization but was unable to be secondary to discomfort  Torsemide was increased by Dr Whit Atkins to 40mg daily  Creatinine is continuing to improve  1 95--> 2 08 --> 1 99 --> 1 84 --> 1 76 --> 1 58  Repeat BMP in am     Renal artery stenosis [facility])  Assessment & Plan  Due for repeat renal u/s  Was unable to be completed during hospitalization secondary to patient discomfort  Can be coordinated as OP    Type 2 MI (myocardial infarction) [facility])  Assessment & Plan  Secondary to demand ischemia due to acute on chronic CHF exacerbation  Imdur started    Controlled type 2 diabetes mellitus with diabetic neuropathy, without long-term current use of insulin ([facility] [address] )  Assessment & Plan  Lab Results   Component Value Date    HGBA1C 5 0 08/16/2019       No results for input(s): POCGLU in the last 72 hours  Blood Sugar Average: Last 72 hrs:     Blood sugars were well controlled during her hospitalization  Her last A1c was 5 0  She did not require any insulin during hospitalization  Recommend diabetic diet    Hyperlipidemia  Assessment & Plan  No statin secondary to significant myalgias  Ideally patient should be on a statin given her hx  Last lipid panel 10/23/2019: Total cholesterol 144  Triglycerides 223  HDL 33  LDL 66  AZUL (obstructive sleep apnea)  Assessment & Plan  No CPAP at home  Last sleep study was approx 5 years ago  Did not wear home CPAP  Was wearing during hospitalization   Patient had an overnight pulse oximetry on 10/28 on 2 L nasal cannula  Patient's pulse oximetry was 90-99% 86 6% of the time and 100% 13 4% of the time  Continue 2L NC @ night  Discussed with Dr Juan Carlos Parker will be planning on noctural POX Sunday night on RA for approval for home O2   Patient is also being followed by pulmonology now and will need to follow up outpatient for sleep study for home CPAP  Reviewed pulm consult  Hyponatremia  Assessment & Plan  Sodium is stable at this time  Mild decrease and asymptomatic  Likely due to decreased free water clearance  Monitor fluid intake      History of aortic valve replacement  Assessment & Plan  Bioprosthetic valve  Last echo was 10/21/2019  Showing moderate left ventricle systolic dysfunction with an EF 41%  Severe hypokinesis to akinesis in the distal half of the anterior, lateral and inferior walls with akinesis of the distal 3rd of the septum  The apex is akinetic to paradoxical  The  base of the heart contracts vigorously  Although these findings could be secondary to coronary artery disease, they also are consistent with Takotsubo's syndrome  Grade 2 left ventricle diastolic dysfunction  Moderate to severe MR  Calcified tricuspid aortic valve  Moderate TR  Mild [place]    Depression  Assessment & Plan  Stable  Continue sertraline 25 mg daily    Essential hypertension  Assessment & Plan  Elevation in blood pressure this morning, has been fairly controlled  Continue metoprolol 25 mg b i d  Torsemide increased to 40 mg daily starting this am and imdur 30mg added yesterday  Follow BP for now with recent changes    H/O: CVA (cerebrovascular accident)  Assessment & Plan  Plavix resumed    Hypothyroidism  Assessment & Plan   Continue levothyroxine 112 mcg daily  TSH within normal limits 10/21/2019    * SDH (subdural hematoma) [facility])  Assessment & Plan  Secondary to a fall 10/8  Patient was admitted to [facility] 10/8 through 10/10    She was evaluated by Neurosurgery who recommended conservative management of SDH  VTE Pharmacologic Prophylaxis:   Pharmacologic: Heparin    Current Length of Stay: 5 day(s)    Current Patient Status: Inpatient Rehab     Discharge Plan: As per treatment team    Code Status: Level 1 - Full Code    Subjective:    no overnight events  Patient reports that she is having some upper back pain and neck pain  She is having her medications currently and Lidoderm patches applied  She reports she had a good breakfast this morning  She is continuing to have some intermittent episodes of shortness of breath and wheezing  It is slightly improved from yesterday  Patient denies chest pain, palpitations, dizziness, lightheadedness, calf pain, nausea, vomiting    Objective:     Vitals:   Temp (24hrs), Av 6 °F (36 4 °C), Min:97 °F (36 1 °C), Max:98 °F (36 7 °C)    Temp:  [97 °F (36 1 °C)-98 °F (36 7 °C)] 97 7 °F (36 5 °C)  HR:  [65-76] 65  Resp:  [20-24] 22  BP: (134-163)/(61-70) 163/70  SpO2:  [97 %-98 %] 98 %  Body mass index is 37 23 kg/m²  Review of Systems   Constitutional: Negative for appetite change, chills, fatigue, fever and unexpected weight change  Respiratory: Positive for shortness of breath and wheezing  Negative for cough and chest tightness  Cardiovascular: Positive for leg swelling (stable  denies calf pain)  Negative for chest pain and palpitations  Gastrointestinal: Negative for abdominal pain, constipation, diarrhea, nausea and vomiting  Musculoskeletal: Positive for arthralgias (posterior shoulder), back pain and neck stiffness  Negative for myalgias  Neurological: Negative for dizziness, syncope, light-headedness and headaches         + restless leg      Input and Output Summary (last 24 hours):        Intake/Output Summary (Last 24 hours) at 2019 0929  Last data filed at 2019 0909  Gross per 24 hour   Intake 660 ml   Output 150 ml   Net 510 ml       Physical Exam:     Physical Exam   Constitutional: She is oriented to person, place, and time  She appears well-developed and well-nourished  No distress  Obese   Neck: No JVD present  Cardiovascular: Normal rate, regular rhythm and normal heart sounds  No murmur heard  Trace B/L LE edema  Compression socks in place   Pulmonary/Chest: Effort normal  No respiratory distress  She has wheezes (diffuse)  She has no rales  Neurological: She is alert and oriented to person, place, and time  No focal deficits   Skin: Skin is warm and dry  Psychiatric: She has a normal mood and affect  Her behavior is normal  Judgment and thought content normal    Nursing note and vitals reviewed        Additional Data:     Labs:    Results from last 7 days   Lab Units 10/31/19  1300 10/30/19  0538   WBC Thousand/uL 5 90 5 20   HEMOGLOBIN g/dL 9 3* 7 6*   HEMATOCRIT % 27 4* 23 0*   PLATELETS Thousands/uL 205 197   LYMPHO PCT %  --  21*   MONO PCT %  --  9   EOS PCT %  --  3     Results from last 7 days   Lab Units 11/02/19  0553   SODIUM mmol/L 133*   POTASSIUM mmol/L 4 1   CHLORIDE mmol/L 91*   CO2 mmol/L 35*   BUN mg/dL 68*   CREATININE mg/dL 1 58*   ANION GAP mmol/L 7   CALCIUM mg/dL 9 2   GLUCOSE RANDOM mg/dL 116*         Results from last 7 days   Lab Units 10/27/19  0606   POC GLUCOSE mg/dl 121             Labs reviewed    Imaging reviewed          Last 24 Hours Medication List:     Current Facility-Administered Medications:  acetaminophen 650 mg Oral Q6H PRN Anton Shrestha MD   albuterol 2 puff Inhalation Q4H PRN Anton Shrestha MD   albuterol 2 5 mg Nebulization Q6H PRN NATHEN Escamilla   clopidogrel 75 mg Oral Daily Anton Shrestha MD   gabapentin 300 mg Oral TID Anton Shrestha MD   heparin (porcine) 5,000 Units Subcutaneous Q8H [facility] Anton Shrestha MD   isosorbide mononitrate 30 mg Oral Daily NATHEN Escamilla   levothyroxine 112 mcg Oral Daily Anton Shrestha MD   lidocaine 3 patch Topical Daily PRN Jimob Hdz MD   menthol-methyl salicylate  Apply externally 4x Daily PRN Fay Owens MD   methocarbamol 500 mg Oral Q6H PRN Anton Shrestha MD   metoprolol tartrate 12 5 mg Oral Q12H [facility] Anton Shrestha MD   nystatin  Topical BID Anton Shrestha MD   pantoprazole 20 mg Oral Daily Before Breakfast Lisa Briones MD   polyethylene glycol 17 g Oral Daily PRN Fay Owens MD   sertraline 25 mg Oral HS Anton Shrestha MD   torsemide 40 mg Oral Daily NATHEN Escamilla   trolamine salicylate 1 application Topical 4x Daily NATHEN Blake*Modal software was used to dictate this note  It may contain errors with dictating incorrect words or incorrect spelling  Please contact the provider directly with any questions

## 2019-11-02 NOTE — NURSING NOTE
Patient had some wheezing this am nurse practioner in room at time did ask respiratory for breathing treatment did have then was better

## 2019-11-03 ENCOUNTER — APPOINTMENT (INPATIENT)
Dept: RADIOLOGY | Facility: HOSPITAL | Age: 84
DRG: 949 | End: 2019-11-03
Payer: COMMERCIAL

## 2019-11-03 LAB
ANION GAP SERPL CALCULATED.3IONS-SCNC: 8 MMOL/L (ref 5–14)
BUN SERPL-MCNC: 70 MG/DL (ref 5–25)
CALCIUM SERPL-MCNC: 9.1 MG/DL (ref 8.4–10.2)
CHLORIDE SERPL-SCNC: 90 MMOL/L (ref 97–108)
CO2 SERPL-SCNC: 35 MMOL/L (ref 22–30)
CREAT SERPL-MCNC: 1.69 MG/DL (ref 0.6–1.2)
GFR SERPL CREATININE-BSD FRML MDRD: 27 ML/MIN/1.73SQ M
GLUCOSE P FAST SERPL-MCNC: 114 MG/DL (ref 70–99)
GLUCOSE SERPL-MCNC: 114 MG/DL (ref 70–99)
POTASSIUM SERPL-SCNC: 4.4 MMOL/L (ref 3.6–5)
SODIUM SERPL-SCNC: 133 MMOL/L (ref 137–147)

## 2019-11-03 PROCEDURE — 97110 THERAPEUTIC EXERCISES: CPT

## 2019-11-03 PROCEDURE — 99232 SBSQ HOSP IP/OBS MODERATE 35: CPT | Performed by: INTERNAL MEDICINE

## 2019-11-03 PROCEDURE — 97530 THERAPEUTIC ACTIVITIES: CPT

## 2019-11-03 PROCEDURE — 99233 SBSQ HOSP IP/OBS HIGH 50: CPT | Performed by: NURSE PRACTITIONER

## 2019-11-03 PROCEDURE — 94760 N-INVAS EAR/PLS OXIMETRY 1: CPT

## 2019-11-03 PROCEDURE — 80048 BASIC METABOLIC PNL TOTAL CA: CPT | Performed by: NURSE PRACTITIONER

## 2019-11-03 PROCEDURE — 94761 N-INVAS EAR/PLS OXIMETRY MLT: CPT

## 2019-11-03 PROCEDURE — 97116 GAIT TRAINING THERAPY: CPT

## 2019-11-03 PROCEDURE — 71046 X-RAY EXAM CHEST 2 VIEWS: CPT

## 2019-11-03 PROCEDURE — 97535 SELF CARE MNGMENT TRAINING: CPT

## 2019-11-03 RX ORDER — FUROSEMIDE 10 MG/ML
80 INJECTION INTRAMUSCULAR; INTRAVENOUS EVERY 12 HOURS
Status: DISCONTINUED | OUTPATIENT
Start: 2019-11-03 | End: 2019-11-05

## 2019-11-03 RX ADMIN — NYSTATIN: 100000 POWDER TOPICAL at 18:03

## 2019-11-03 RX ADMIN — ALBUTEROL SULFATE 2 PUFF: 90 AEROSOL, METERED RESPIRATORY (INHALATION) at 14:37

## 2019-11-03 RX ADMIN — TORSEMIDE 40 MG: 20 TABLET ORAL at 08:41

## 2019-11-03 RX ADMIN — HEPARIN SODIUM 5000 UNITS: 5000 INJECTION INTRAVENOUS; SUBCUTANEOUS at 21:55

## 2019-11-03 RX ADMIN — HEPARIN SODIUM 5000 UNITS: 5000 INJECTION INTRAVENOUS; SUBCUTANEOUS at 14:37

## 2019-11-03 RX ADMIN — PANTOPRAZOLE 20 MG: 20 TABLET, DELAYED RELEASE ORAL at 06:23

## 2019-11-03 RX ADMIN — LEVOTHYROXINE SODIUM 112 MCG: 112 TABLET ORAL at 06:23

## 2019-11-03 RX ADMIN — TROLAMINE SALICYLATE 1 APPLICATION: 10 CREAM TOPICAL at 21:56

## 2019-11-03 RX ADMIN — GABAPENTIN 300 MG: 300 CAPSULE ORAL at 16:48

## 2019-11-03 RX ADMIN — LIDOCAINE 2 PATCH: 50 PATCH CUTANEOUS at 08:42

## 2019-11-03 RX ADMIN — GABAPENTIN 300 MG: 300 CAPSULE ORAL at 08:42

## 2019-11-03 RX ADMIN — FUROSEMIDE 80 MG: 10 INJECTION, SOLUTION INTRAMUSCULAR; INTRAVENOUS at 15:27

## 2019-11-03 RX ADMIN — CLOPIDOGREL BISULFATE 75 MG: 75 TABLET, FILM COATED ORAL at 08:41

## 2019-11-03 RX ADMIN — NYSTATIN: 100000 POWDER TOPICAL at 08:43

## 2019-11-03 RX ADMIN — METHOCARBAMOL 500 MG: 500 TABLET ORAL at 08:42

## 2019-11-03 RX ADMIN — ISOSORBIDE MONONITRATE 30 MG: 30 TABLET, EXTENDED RELEASE ORAL at 08:41

## 2019-11-03 RX ADMIN — TROLAMINE SALICYLATE 1 APPLICATION: 10 CREAM TOPICAL at 18:03

## 2019-11-03 RX ADMIN — ALBUTEROL SULFATE 2 PUFF: 90 AEROSOL, METERED RESPIRATORY (INHALATION) at 10:12

## 2019-11-03 RX ADMIN — METOPROLOL TARTRATE 12.5 MG: 25 TABLET ORAL at 08:42

## 2019-11-03 RX ADMIN — SERTRALINE 25 MG: 25 TABLET, FILM COATED ORAL at 21:55

## 2019-11-03 RX ADMIN — ACETAMINOPHEN 650 MG: 325 TABLET ORAL at 08:42

## 2019-11-03 RX ADMIN — TROLAMINE SALICYLATE 1 APPLICATION: 10 CREAM TOPICAL at 08:43

## 2019-11-03 RX ADMIN — ACETAMINOPHEN 650 MG: 325 TABLET ORAL at 16:48

## 2019-11-03 RX ADMIN — HEPARIN SODIUM 5000 UNITS: 5000 INJECTION INTRAVENOUS; SUBCUTANEOUS at 06:23

## 2019-11-03 RX ADMIN — GABAPENTIN 300 MG: 300 CAPSULE ORAL at 21:55

## 2019-11-03 RX ADMIN — METOPROLOL TARTRATE 25 MG: 25 TABLET ORAL at 21:55

## 2019-11-03 RX ADMIN — METHOCARBAMOL 500 MG: 500 TABLET ORAL at 16:49

## 2019-11-03 NOTE — ASSESSMENT & PLAN NOTE
Reviewed Nephrology note  Patient with a baseline creatinine of 1 5-1 6  Her torsemide was reduced secondary to increasing renal function  She does need to have been repeat renal artery ultrasound secondary to history of renal artery stenosis  It was attempted to be completed during his recent hospitalization but was unable to be secondary to discomfort  Torsemide was increased by Dr Ruffus Hammans to 40mg daily  Creatinine is continuing to improve    1 95--> 2 08 --> 1 99 --> 1 84 --> 1 76 --> 1 58--> 1 69  Repeat BMP in am

## 2019-11-03 NOTE — NURSING NOTE
Patient is becoming more wheezy inhaler used but still wheezy nephrology in to see ordered iv lasix which was given patient has voided x2 since dose given

## 2019-11-03 NOTE — ASSESSMENT & PLAN NOTE
Weight (last 2 days)     Date/Time   Weight    11/03/19 0842   83 9 (185)    11/02/19 0600   83 6 (184 3)    11/01/19 0600   83 1 (183 2)            Patient with acute on chronic diastolic CHF  She did require BiPAP, IV diuresis during her ICU stay  Recommend daily weights with the same scale  Weights have been on different scales and therefore unreliable - discussed with nursing to have same scale daily  Last echo was 10/21/2019  Showing moderate left ventricle systolic dysfunction with an EF 41%  Severe hypokinesis to akinesis in the distal half of the anterior, lateral and inferior walls with akinesis of the distal 3rd of the septum  The apex is akinetic to paradoxical  The  base of the heart contracts vigorously  Although these findings could be secondary to coronary artery disease, they also are consistent with Takotsubo's syndrome  Grade 2 left ventricle diastolic dysfunction  Moderate to severe MR  Calcified tricuspid aortic valve  Moderate TR  Mild [place]    Pt does not appear fluid overload  Weights relatively stable and last 3 weights have been on same scale  Continue fluid restrictions  Trace bilateral lower extremity edema  Discussed with patient continue compression stockings and elevation of lower extremities  She is having some SOB, HERNANDEZ this am - improved from yesterday  She did receive lasix 20mg IV 11/1 and torsemide has been titrated up and is now 40mg daily  She does c/o wheezing and she is noted to have diffuse wheezing, no crackles  She received a neb yesterday that did improve      IS given by therapy today and encouraged use    Repeat BMP and BNP in am  CXR now

## 2019-11-03 NOTE — ASSESSMENT & PLAN NOTE
Secondary to a fall 10/8  Patient was admitted to [facility] 10/8 through 10/10  She was evaluated by Neurosurgery who recommended conservative management of SDH

## 2019-11-03 NOTE — PROGRESS NOTES
11/03/19 1010   Pain Assessment   Pain Assessment 0-10   Pain Score 3   Pain Type Chronic pain   Pain Location Shoulder   Pain Orientation Bilateral   Pain Frequency Intermittent   Pain Onset   (when walking)   Hospital Pain Intervention(s) Rest   Response to Interventions 0/10   Restrictions/Precautions   Precautions Fall Risk;Fluid restriction   Sit to Stand   Type of Assistance Needed Supervision   Sit to Stand CARE Score 4   Bed-Chair Transfer   Type of Assistance Needed Supervision; Adaptive equipment   Chair/Bed-to-Chair Transfer CARE Score 4   Transfer Bed/Chair/Wheelchair   Adaptive Equipment Roller Walker   Car Transfer   Type of Assistance Needed Physical assistance;Verbal cues   Amount of Physical Assistance Provided 25%-49%   Car Transfer CARE Score 3   Walk 10 Feet   Type of Assistance Needed Supervision; Adaptive equipment   Walk 10 Feet CARE Score 4   Walk 50 Feet with Two Turns   Type of Assistance Needed Supervision; Adaptive equipment   Walk 50 Feet with Two Turns CARE Score 4   Walk 150 Feet   Comment fatigued at 100ft   Reason if not Attempted Safety concerns   Walk 150 Feet CARE Score 88   Walking 10 Feet on Uneven Surfaces   Type of Assistance Needed Supervision; Adaptive equipment   Comment ramp with RW   Walking 10 Feet on Uneven Surfaces CARE Score 4   Ambulation   Does the patient walk? 2  Yes   Primary Mode of Locomotion Prior to Admission Walk   Distance Walked (feet) 100 ft  (x2)   Assist Device Roller Walker   Gait Pattern Slow Jenny;Decreased foot clearance   Wheelchair mobility   Does the patient use a wheelchair? 0  No   Curb or Single Stair   Style negotiated Curb   Type of Assistance Needed Incidental touching; Adaptive equipment   Amount of Physical Assistance Provided Less than 25%   Comment performed x 2   1 Step (Curb) CARE Score 3   4 Steps   Comment wheezing and fatigued after 2 steps   Reason if not Attempted Safety concerns   4 Steps CARE Score 88   12 Steps   Reason if not Attempted Activity not applicable   12 Steps CARE Score 9   Picking Up Object   Type of Assistance Needed Incidental touching; Adaptive equipment   Amount of Physical Assistance Provided No physical assistance   Comment using reacher   Picking Up Object CARE Score 4   Therapeutic Interventions   Flexibility passive seated stretch of B HS and calves    Equipment Use   NuStep Nustep x 12 min w/o rets break lvl 2 spm 25-35 for general conditioning, O2 sats remained 90%   Other Comments   Comments O2 Sats checked by Respiratory therapist during ambulation w/o oxygen, 91% while ambulating   Assessment   Treatment Assessment Pt continues to participate well despite having wheezing intermittently  Pt SOB and wheezing with any activity that involves trunk flexion, i e  scooting back in chair, or lower body dressing  Continue sto require extra time for all activities  No LOB or dizziness reported  Per [employer] ,CRNP, with IM d/c pending medical clearance  Will plan to work with pt tomorrow for continued strength,mobility training to maximize functional Safety  Recommendation   Recommendation Home with family support;Home PT   Equipment Recommended Walker   PT Therapy Minutes   PT Time In 1010   PT Time Out 1140   PT Total Time (minutes) 90   PT Mode of treatment - Individual (minutes) 90   PT Mode of treatment - Concurrent (minutes) 0   PT Mode of treatment - Group (minutes) 0   PT Mode of treatment - Co-treat (minutes) 0   PT Mode of Treatment - Total time(minutes) 90 minutes   PT Cumulative Minutes 480   Therapy Time missed   Time missed?  No

## 2019-11-03 NOTE — PROGRESS NOTES
NEPHROLOGY PROGRESS NOTE   Elian Ward 80 y o  female MRN: [id number]  Unit/Bed#: [facility] 357-69 Encounter: [id number]  Reason for Consult: HERLINDA    ASSESSMENT/PLAN:  1  Acute kidney injury most likely secondary to hemodynamic fluctuations as well as volume overload  2  Chronic kidney disease stage 3, baseline creatinine 1 5-1 6  3  Hypertension, slightly elevated in the 160s  4  History of cardiomyopathy with ejection fraction 44% with diastolic dysfunction, continue with current diuretic dosing  5  Hyponatremia likely secondary to volume overload    PLAN:  · Renal function appears fairly stable with creatinine of 0 69  · Clinically patient appears overloaded, discontinue torsemide, resume IV diuretics, Lasix 80 mg b i d  · Blood pressure overall appears fairly stable  · Chest x-ray is pending    SUBJECTIVE:  Seen examined  Patient much more short of breath  Unable to do incentive spirometry  No active chest pain or pressure  Denies any abdominal pain  Review of Systems    OBJECTIVE:  Current Weight: Weight - Scale: 83 9 kg (185 lb)  Vitals:    11/02/19 1612 11/02/19 2100 11/03/19 0715 11/03/19 0842   BP: 131/62 135/63 134/60    BP Location: Right arm Right arm Right arm    Pulse: 69 70 69    Resp: 18 (!) 24 (!) 24    Temp: (!) 97 3 °F (36 3 °C) (!) 97 1 °F (36 2 °C) 99 3 °F (37 4 °C)    TempSrc: Tympanic Tympanic Tympanic    SpO2: 95% 100% 98%    Weight:    83 9 kg (185 lb)   Height:           Intake/Output Summary (Last 24 hours) at 11/3/2019 1512  Last data filed at 11/3/2019 1400  Gross per 24 hour   Intake 540 ml   Output 925 ml   Net -385 ml       Physical Exam   Constitutional: No distress  Neck: Neck supple  Cardiovascular: Normal rate and regular rhythm  Pulmonary/Chest: Tachypnea noted  She has rales in the right lower field and the left lower field  Abdominal: Soft  Musculoskeletal: She exhibits edema (Trace to 1+)  Neurological: She is alert  Skin: Skin is warm and dry  No rash noted  Medications:    Current Facility-Administered Medications:     acetaminophen (TYLENOL) tablet 650 mg, 650 mg, Oral, Q6H PRN, Anton Shrestha MD, 650 mg at 11/03/19 0842    albuterol (PROVENTIL HFA,VENTOLIN HFA) inhaler 2 puff, 2 puff, Inhalation, Q4H PRN, Jordan Baird MD, 2 puff at 11/03/19 1437    albuterol inhalation solution 2 5 mg, 2 5 mg, Nebulization, Q6H PRN, NATHEN Escamilla, 2 5 mg at 11/02/19 0948    clopidogrel (PLAVIX) tablet 75 mg, 75 mg, Oral, Daily, Anton Shrestha MD, 75 mg at 11/03/19 0841    furosemide (LASIX) injection 80 mg, 80 mg, Intravenous, Q12H, Yamini Murcia DO    gabapentin (NEURONTIN) capsule 300 mg, 300 mg, Oral, TID, Anton Shrestha MD, 300 mg at 11/03/19 0842    heparin (porcine) subcutaneous injection 5,000 Units, 5,000 Units, Subcutaneous, Q8H [address], Jordan Baird MD, 5,000 Units at 11/03/19 1437    isosorbide mononitrate (IMDUR) 24 hr tablet 30 mg, 30 mg, Oral, Daily, NATHEN Escamilla, 30 mg at 11/03/19 0841    levothyroxine tablet 112 mcg, 112 mcg, Oral, Daily, Anton Shrestha MD, 112 mcg at 11/03/19 0623    lidocaine (LIDODERM) 5 % patch 3 patch, 3 patch, Topical, Daily PRN, Jordan Baird MD, 2 patch at 11/03/19 0842    menthol-methyl salicylate (BENGAY) 39-65 % cream, , Apply externally, 4x Daily PRN, Jordan Baird MD    methocarbamol (ROBAXIN) tablet 500 mg, 500 mg, Oral, Q6H PRN, Kenn Shrestha MD, 500 mg at 11/03/19 0842    metoprolol tartrate (LOPRESSOR) tablet 25 mg, 25 mg, Oral, Q12H [address], Ze Lloyd MD    nystatin (MYCOSTATIN) powder, , Topical, BID, Anton Shrestha MD    pantoprazole (PROTONIX) EC tablet 20 mg, 20 mg, Oral, Daily Before Breakfast, Ze Lloyd MD, 20 mg at 11/03/19 1517    polyethylene glycol (MIRALAX) packet 17 g, 17 g, Oral, Daily PRN, Jordan Baird MD    sertraline (ZOLOFT) tablet 25 mg, 25 mg, Oral, HS, Anton Shrestha MD, 25 mg at 11/02/19 2102    trolamine salicylate (ASPERCREME) 10 % cream 1 application, 1 application, Topical, 4x Daily, Alfonso Cabot, CRNP, 1 application at 45/50/40 9016    Laboratory Results:  Results from last 7 days   Lab Units 11/03/19  0538 11/02/19  0553 11/01/19  0729 10/31/19  1300 10/31/19  0441 10/30/19  0538 10/29/19  0525 10/28/19  0433   WBC Thousand/uL  --   --   --  5 90  --  5 20  --   --    HEMOGLOBIN g/dL  --   --   --  9 3*  --  7 6*  --   --    HEMATOCRIT %  --   --   --  27 4*  --  23 0*  --   --    PLATELETS Thousands/uL  --   --   --  205  --  197  --   --    POTASSIUM mmol/L 4 4 4 1 4 1  --  3 9 3 8 3 8 3 7   CHLORIDE mmol/L 90* 91* 91*  --  90* 89* 89* 90*   CO2 mmol/L 35* 35* 34*  --  35* 36* 36* 37*   BUN mg/dL 70* 68* 68*  --  68* 72* 72* 63*   CREATININE mg/dL 1 69* 1 58* 1 76*  --  1 84* 1 99* 2 08* 1 95*   CALCIUM mg/dL 9 1 9 2 9 5  --  9 0 8 9 8 8 8 9

## 2019-11-03 NOTE — RESPIRATORY THERAPY NOTE
Home Oxygen Qualifying Test       Patient name: Daisy Johnson        : 1934   Date of Test:  November 3, 2019  Diagnosis:      Home Oxygen Test:    **Medicare Guidelines require item(s) 1-5 on all ambulatory patients or 1 and 2 on non-ambulatory patients  1   Baseline SPO2 on Room Air at rest 93 %  2   SPO2 during exercise on Room Air 91 %  During exercise monitor SpO2  If SPO2 increases >=89% with ambulation do not add supplemental             oxygen  If <= 88% on room air add O2 via NC and titrate patient  Patient must be ambulated with O2 and titrated to > 88% with exertion       5  Exercise performed:          duration 100 (min)          []  Supplemental Home Oxygen is indicated  [x]  Client does not qualify for home oxygen        Respiratory Additional Notes-   Gina Alegria, RT

## 2019-11-03 NOTE — ASSESSMENT & PLAN NOTE
Her hemoglobin did drop from 8 1-->7 6  Patient was experiencing some mild shortness of breath and transfused for symptomatic anemia  Patient is status post transfusion 10/30 she received 1 unit of PRBCs     Hgb 10/31 9 3    Continue to transfuse with hemoglobin < 7 0   CBC in am

## 2019-11-03 NOTE — ASSESSMENT & PLAN NOTE
Weight (last 2 days)     Date/Time   Weight    11/03/19 0842   83 9 (185)    11/02/19 0600   83 6 (184 3)    11/01/19 0600   83 1 (183 2)            Patient with acute on chronic diastolic CHF  She did require BiPAP, IV diuresis during her ICU stay  Recommend daily weights with the same scale  Weights have been on different scales and therefore unreliable - discussed with nursing to have same scale daily  Last echo was 10/21/2019  Showing moderate left ventricle systolic dysfunction with an EF 41%  Severe hypokinesis to akinesis in the distal half of the anterior, lateral and inferior walls with akinesis of the distal 3rd of the septum  The apex is akinetic to paradoxical  The  base of the heart contracts vigorously  Although these findings could be secondary to coronary artery disease, they also are consistent with Takotsubo's syndrome  Grade 2 left ventricle diastolic dysfunction  Moderate to severe MR  Calcified tricuspid aortic valve  Moderate TR  Mild TN    Pt does not appear fluid overload  Weights relatively stable and last 3 weights have been on same scale  Continue fluid restrictions  Trace bilateral lower extremity edema  Discussed with patient continue compression stockings and elevation of lower extremities  She is having some SOB, HERNANDEZ this am - improved from yesterday  She did receive lasix 20mg IV 11/1 and torsemide has been titrated up and is now 40mg daily  She does c/o wheezing and she is noted to have diffuse wheezing, no crackles  She received a neb yesterday that did improve    Should have home neb arranged    IS given by therapy today and encouraged use    Repeat BMP and BNP in am  CXR now

## 2019-11-03 NOTE — PLAN OF CARE
Problem: CARDIOVASCULAR - ADULT  Goal: Maintains optimal cardiac output and hemodynamic stability  Description  INTERVENTIONS:  - Monitor I/O, vital signs and rhythm  - Monitor for S/S and trends of decreased cardiac output  - Administer and titrate ordered vasoactive medications to optimize hemodynamic stability  - Assess quality of pulses, skin color and temperature  - Assess for signs of decreased coronary artery perfusion  - Instruct patient to report change in severity of symptoms  Outcome: Progressing  Goal: Absence of cardiac dysrhythmias or at baseline rhythm  Description  INTERVENTIONS:  - Continuous cardiac monitoring, vital signs, obtain 12 lead EKG if ordered  - Administer antiarrhythmic and heart rate control medications as ordered  - Monitor electrolytes and administer replacement therapy as ordered  Outcome: Progressing     Problem: RESPIRATORY - ADULT  Goal: Achieves optimal ventilation and oxygenation  Description  INTERVENTIONS:  - Assess for changes in respiratory status  - Assess for changes in mentation and behavior  - Position to facilitate oxygenation and minimize respiratory effort  - Oxygen administered by appropriate delivery if ordered  - Initiate smoking cessation education as indicated  - Encourage broncho-pulmonary hygiene including cough, deep breathe, Incentive Spirometry  - Assess the need for suctioning and aspirate as needed  - Assess and instruct to report SOB or any respiratory difficulty  - Respiratory Therapy support as indicated  Outcome: Progressing     Problem: GASTROINTESTINAL - ADULT  Goal: Maintains adequate nutritional intake  Description  INTERVENTIONS:  - Monitor percentage of each meal consumed  - Identify factors contributing to decreased intake, treat as appropriate  - Assist with meals as needed  - Monitor I&O, weight, and lab values if indicated  - Obtain nutrition services referral as needed  Outcome: Progressing     Problem: METABOLIC, FLUID AND ELECTROLYTES - ADULT  Goal: Electrolytes maintained within normal limits  Description  INTERVENTIONS:  - Monitor labs and assess patient for signs and symptoms of electrolyte imbalances  - Administer electrolyte replacement as ordered  - Monitor response to electrolyte replacements, including repeat lab results as appropriate  - Instruct patient on fluid and nutrition as appropriate  Outcome: Progressing  Goal: Fluid balance maintained  Description  INTERVENTIONS:  - Monitor labs   - Monitor I/O and WT  - Instruct patient on fluid and nutrition as appropriate  - Assess for signs & symptoms of volume excess or deficit  Outcome: Progressing  Goal: Glucose maintained within target range  Description  INTERVENTIONS:  - Monitor Blood Glucose as ordered  - Assess for signs and symptoms of hyperglycemia and hypoglycemia  - Administer ordered medications to maintain glucose within target range  - Assess nutritional intake and initiate nutrition service referral as needed  Outcome: Progressing     Problem: SKIN/TISSUE INTEGRITY - ADULT  Goal: Skin integrity remains intact  Description  INTERVENTIONS  - Identify patients at risk for skin breakdown  - Assess and monitor skin integrity  - Assess and monitor nutrition and hydration status  - Monitor labs (i e  albumin)  - Assess for incontinence   - Turn and reposition patient  - Assist with mobility/ambulation  - Relieve pressure over bony prominences  - Avoid friction and shearing  - Provide appropriate hygiene as needed including keeping skin clean and dry  - Evaluate need for skin moisturizer/barrier cream  - Collaborate with interdisciplinary team (i e  Nutrition, Rehabilitation, etc )   - Patient/family teaching  Outcome: Progressing  Goal: Oral mucous membranes remain intact  Description  INTERVENTIONS  - Assess oral mucosa and hygiene practices  - Implement preventative oral hygiene regimen  - Implement oral medicated treatments as ordered  - Initiate Nutrition services referral as needed  Outcome: Progressing     Problem: MUSCULOSKELETAL - ADULT  Goal: Maintain or return mobility to safest level of function  Description  INTERVENTIONS:  - Assess patient's ability to carry out ADLs; assess patient's baseline for ADL function and identify physical deficits which impact ability to perform ADLs (bathing, care of mouth/teeth, toileting, grooming, dressing, etc )  - Assess/evaluate cause of self-care deficits   - Assess range of motion  - Assess patient's mobility  - Assess patient's need for assistive devices and provide as appropriate  - Encourage maximum independence but intervene and supervise when necessary  - Involve family in performance of ADLs  - Assess for home care needs following discharge   - Consider OT consult to assist with ADL evaluation and planning for discharge  - Provide patient education as appropriate  Outcome: Progressing  Goal: Maintain proper alignment of affected body part  Description  INTERVENTIONS:  - Support, maintain and protect limb and body alignment  - Provide patient/ family with appropriate education  Outcome: Progressing     Problem: COPING  Goal: Pt/Family able to verbalize concerns and demonstrate effective coping strategies  Description  INTERVENTIONS:  - Assist patient/family to identify coping skills, available support systems and cultural and spiritual values  - Provide emotional support, including active listening and acknowledgement of concerns of patient and caregivers  - Reduce environmental stimuli, as able  - Provide patient education  - Assess for spiritual pain/suffering and initiate spiritual care, including notification of Pastoral Care or [place] based community as needed  - Assess effectiveness of coping strategies  Outcome: Progressing  Goal: Will report anxiety at manageable levels  Description  INTERVENTIONS:  - Administer medication as ordered  - Teach and encourage coping skills  - Provide emotional support  - Assess patient/family for anxiety and ability to cope  Outcome: Progressing     Problem: Prexisting or High Potential for Compromised Skin Integrity  Goal: Skin integrity is maintained or improved  Description  INTERVENTIONS:  - Identify patients at risk for skin breakdown  - Assess and monitor skin integrity  - Assess and monitor nutrition and hydration status  - Monitor labs   - Assess for incontinence   - Turn and reposition patient  - Assist with mobility/ambulation  - Relieve pressure over bony prominences  - Avoid friction and shearing  - Provide appropriate hygiene as needed including keeping skin clean and dry  - Evaluate need for skin moisturizer/barrier cream  - Collaborate with interdisciplinary team   - Patient/family teaching  - Consider wound care consult   Outcome: Progressing     Problem: Potential for Falls  Goal: Patient will remain free of falls  Description  INTERVENTIONS:  - Assess patient frequently for physical needs  -  Identify cognitive and physical deficits and behaviors that affect risk of falls    -  [place] fall precautions as indicated by assessment   - Educate patient/family on patient safety including physical limitations  - Instruct patient to call for assistance with activity based on assessment  - Modify environment to reduce risk of injury  - Consider OT/PT consult to assist with strengthening/mobility  Outcome: Progressing     Problem: PAIN - ADULT  Goal: Verbalizes/displays adequate comfort level or baseline comfort level  Description  Interventions:  - Encourage patient to monitor pain and request assistance  - Assess pain using appropriate pain scale  - Administer analgesics based on type and severity of pain and evaluate response  - Implement non-pharmacological measures as appropriate and evaluate response  - Consider cultural and social influences on pain and pain management  - Notify physician/advanced practitioner if interventions unsuccessful or patient reports new pain  Outcome: Progressing

## 2019-11-03 NOTE — ASSESSMENT & PLAN NOTE
Elevation mild elevation today 130s  Has been fairly controlled  Continue metoprolol 12 5 mg b i d  Torsemide increased to 40 mg daily starting 11/2 and imdur 30mg added 11/1  Follow BP for now with recent changes    Caution on adjustments with change in diuretic and imdur with impending d/c home Monday/Tuesday

## 2019-11-03 NOTE — PROGRESS NOTES
Progress Note - Claudetta Busman 1934, 80 y o  female MRN: [id number]    Unit/Bed#: [facility] 268-02 Encounter: [id number]    Primary Care Provider: Dereje York DO   Date and time admitted to hospital: 10/28/2019  1:22 PM    Patient was a potential discharge for tomorrow  Do not feel she is medically ready for discharge at this time  Plan for repeat blood work in the a m - CBC, BMP, BNP  And a chest x-ray now  Discussed with patient, her daughter and Dr Yan Stokes    Acute on chronic diastolic heart failure [facility])  Assessment & Plan  Weight (last 2 days)     Date/Time   Weight    11/03/19 0842   83 9 (185)    11/02/19 0600   83 6 (184 3)    11/01/19 0600   83 1 (183 2)            Patient with acute on chronic diastolic CHF  She did require BiPAP, IV diuresis during her ICU stay  Recommend daily weights with the same scale  Weights have been on different scales and therefore unreliable - discussed with nursing to have same scale daily  Last echo was 10/21/2019  Showing moderate left ventricle systolic dysfunction with an EF 41%  Severe hypokinesis to akinesis in the distal half of the anterior, lateral and inferior walls with akinesis of the distal 3rd of the septum  The apex is akinetic to paradoxical  The  base of the heart contracts vigorously  Although these findings could be secondary to coronary artery disease, they also are consistent with Takotsubo's syndrome  Grade 2 left ventricle diastolic dysfunction  Moderate to severe MR  Calcified tricuspid aortic valve  Moderate TR  Mild MS    Pt does not appear fluid overload  Weights relatively stable and last 3 weights have been on same scale  Continue fluid restrictions  Trace bilateral lower extremity edema  Discussed with patient continue compression stockings and elevation of lower extremities  She is having some SOB, HERNANDEZ this am - improved from yesterday    She did receive lasix 20mg IV 11/1 and torsemide has been titrated up and is now 40mg daily     She does c/o wheezing and she is noted to have diffuse wheezing, no crackles  She received a neb yesterday that did improve  Should have home neb arranged    IS given by therapy today and encouraged use    Repeat BMP and BNP in am  CXR now    Chronic anemia  Assessment & Plan  Her hemoglobin did drop from 8 1-->7 6  Patient was experiencing some mild shortness of breath and transfused for symptomatic anemia  Patient is status post transfusion 10/30 she received 1 unit of PRBCs     Hgb 10/31 9 3  Continue to transfuse with hemoglobin < 7 0   CBC in am    Acute-on-chronic kidney injury [facility])  Assessment & Plan  Reviewed Nephrology note  Patient with a baseline creatinine of 1 5-1 6  Her torsemide was reduced secondary to increasing renal function  She does need to have been repeat renal artery ultrasound secondary to history of renal artery stenosis  It was attempted to be completed during his recent hospitalization but was unable to be secondary to discomfort  Torsemide was increased by Dr Lillian Castellon to 40mg daily  Creatinine is continuing to improve  1 95--> 2 08 --> 1 99 --> 1 84 --> 1 76 --> 1 58--> 1 69  Repeat BMP in am     Renal artery stenosis [facility])  Assessment & Plan  Due for repeat renal u/s  Was unable to be completed during hospitalization secondary to patient discomfort  Can be coordinated as OP    Type 2 MI (myocardial infarction) [facility])  Assessment & Plan  Secondary to demand ischemia due to acute on chronic CHF exacerbation  Imdur started    Controlled type 2 diabetes mellitus with diabetic neuropathy, without long-term current use of insulin ([address] )  Assessment & Plan  Lab Results   Component Value Date    HGBA1C 5 0 08/16/2019       No results for input(s): POCGLU in the last 72 hours  Blood Sugar Average: Last 72 hrs:     Blood sugars were well controlled during her hospitalization  Her last A1c was 5 0  She did not require any insulin during hospitalization    Recommend diabetic diet    Hyperlipidemia  Assessment & Plan  No statin secondary to significant myalgias  Ideally patient should be on a statin given her hx  Last lipid panel 10/23/2019: Total cholesterol 144  Triglycerides 223  HDL 33  LDL 66  AZUL (obstructive sleep apnea)  Assessment & Plan  No CPAP at home  Last sleep study was approx 5 years ago  Did not wear home CPAP  Was wearing during hospitalization  Patient had an overnight pulse oximetry on 10/28 on 2 L nasal cannula  Patient's pulse oximetry was 90-99% 86 6% of the time and 100% 13 4% of the time  Continue 2L NC @ night  Discussed with Dr Gudelia Goel will be planning on noctural POX Sunday night on RA for approval for home O2   Patient is also being followed by pulmonology now and will need to follow up outpatient for sleep study for home CPAP  Reviewed pulm consult  Discussed with therapy pt had ambulatory Pox on RA this am and sat 91%    Hyponatremia  Assessment & Plan  Sodium is stable at this time  Mild decrease and asymptomatic  Continue with fluid restrictions  Repeat BMP in am    History of aortic valve replacement  Assessment & Plan  Bioprosthetic valve  Last echo was 10/21/2019  Showing moderate left ventricle systolic dysfunction with an EF 41%  Severe hypokinesis to akinesis in the distal half of the anterior, lateral and inferior walls with akinesis of the distal 3rd of the septum  The apex is akinetic to paradoxical  The  base of the heart contracts vigorously  Although these findings could be secondary to coronary artery disease, they also are consistent with Takotsubo's syndrome  Grade 2 left ventricle diastolic dysfunction  Moderate to severe MR  Calcified tricuspid aortic valve  Moderate TR  Mild SC    Depression  Assessment & Plan  Stable  Continue sertraline 25 mg daily    Essential hypertension  Assessment & Plan  Elevation mild elevation today 130s  Has been fairly controlled  Continue metoprolol 12 5 mg b i d  Torsemide increased to 40 mg daily starting  and imdur 30mg added   Follow BP for now with recent changes  Caution on adjustments with change in diuretic and imdur with impending d/c home Monday/Tuesday    H/O: CVA (cerebrovascular accident)  Assessment & Plan  Plavix resumed    Hypothyroidism  Assessment & Plan   Continue levothyroxine 112 mcg daily  TSH within normal limits 10/21/2019    * SDH (subdural hematoma) [facility])  Assessment & Plan  Secondary to a fall 10/8  Patient was admitted to [facility] 10/8 through 10/10  She was evaluated by Neurosurgery who recommended conservative management of SDH  VTE Pharmacologic Prophylaxis:   Pharmacologic: Heparin    Current Length of Stay: 6 day(s)    Current Patient Status: Inpatient Rehab     Discharge Plan: As per treatment team       Code Status: Level 1 - Full Code    Subjective:    patient was seen in therapy gym  She did experience some shortness of breath and wheezing today  Therapy reports that she did 12 minutes on the bicycle and was having a conversation while using bicycle  She denied any shortness of breath or dyspnea exertion at that time that there was audible wheezing by therapist   Therapy also indicates that she will have wheezing with exertion such as getting herself dressed  Patient denies shortness of breath or dyspnea exertion at this time  Denies chest pain, palpitations, dizziness, lightheadedness  Denies abdominal pain, nausea, vomiting  No difficulties urinating  Is maintaining fluid restrictions    Objective:     Vitals:   Temp (24hrs), Av 9 °F (36 6 °C), Min:97 1 °F (36 2 °C), Max:99 3 °F (37 4 °C)    Temp:  [97 1 °F (36 2 °C)-99 3 °F (37 4 °C)] 99 3 °F (37 4 °C)  HR:  [69-70] 69  Resp:  [18-24] 24  BP: (131-135)/(60-63) 134/60  SpO2:  [95 %-100 %] 98 %  Body mass index is 37 37 kg/m²  Review of Systems   Constitutional: Negative for appetite change, chills, fatigue, fever and unexpected weight change  Respiratory: Positive for shortness of breath and wheezing  Negative for cough and chest tightness  Cardiovascular: Positive for leg swelling (stable  denies calf pain)  Negative for chest pain and palpitations  Gastrointestinal: Negative for abdominal pain, constipation, diarrhea, nausea and vomiting  Genitourinary: Negative for difficulty urinating and dysuria  Musculoskeletal: Positive for arthralgias (posterior shoulder) and back pain  Negative for myalgias and neck stiffness  Neurological: Negative for dizziness, syncope, light-headedness and headaches         + restless leg   Psychiatric/Behavioral: The patient is nervous/anxious  Input and Output Summary (last 24 hours): Intake/Output Summary (Last 24 hours) at 11/3/2019 1125  Last data filed at 11/3/2019 0601  Gross per 24 hour   Intake 660 ml   Output 775 ml   Net -115 ml       Physical Exam:     Physical Exam   Constitutional: She is oriented to person, place, and time  She appears well-developed and well-nourished  No distress  Obese   Neck: No JVD present  Cardiovascular: Normal rate, regular rhythm and normal heart sounds  No murmur heard  Trace B/L LE edema  Compression socks in place   Pulmonary/Chest: Effort normal  No respiratory distress  She has wheezes (diffuse expiratory)  She has no rales  Neurological: She is alert and oriented to person, place, and time  No focal deficits   Skin: Skin is warm and dry  Psychiatric: She has a normal mood and affect  Her behavior is normal  Judgment and thought content normal    Nursing note and vitals reviewed        Additional Data:     Labs:    Results from last 7 days   Lab Units 10/31/19  1300 10/30/19  0538   WBC Thousand/uL 5 90 5 20   HEMOGLOBIN g/dL 9 3* 7 6*   HEMATOCRIT % 27 4* 23 0*   PLATELETS Thousands/uL 205 197   LYMPHO PCT %  --  21*   MONO PCT %  --  9   EOS PCT %  --  3     Results from last 7 days   Lab Units 11/03/19  0538   SODIUM mmol/L 133* POTASSIUM mmol/L 4 4   CHLORIDE mmol/L 90*   CO2 mmol/L 35*   BUN mg/dL 70*   CREATININE mg/dL 1 69*   ANION GAP mmol/L 8   CALCIUM mg/dL 9 1   GLUCOSE RANDOM mg/dL 114*     Labs reviewed    Imaging reviewed    Recent Cultures (last 7 days):           Last 24 Hours Medication List:     Current Facility-Administered Medications:  acetaminophen 650 mg Oral Q6H PRN Anton Shrestha MD   albuterol 2 puff Inhalation Q4H PRN Anton Shrestha MD   albuterol 2 5 mg Nebulization Q6H PRN NATHEN Escamilla   clopidogrel 75 mg Oral Daily Anton Shrestha MD   gabapentin 300 mg Oral TID Anton Shrestha MD   heparin (porcine) 5,000 Units Subcutaneous Q8H [address] Anton Shrestha MD   isosorbide mononitrate 30 mg Oral Daily NATHEN Escamilla   levothyroxine 112 mcg Oral Daily Anton Shrestha MD   lidocaine 3 patch Topical Daily PRN Anton Howe MD   menthol-methyl salicylate  Apply externally 4x Daily PRN Anton Shrestha MD   methocarbamol 500 mg Oral Q6H PRN Anton Shrestha MD   metoprolol tartrate 12 5 mg Oral Q12H [address] Anton Shrestha MD   nystatin  Topical BID Anton Shrestha MD   pantoprazole 20 mg Oral Daily Before Breakfast Richard Scott MD   polyethylene glycol 17 g Oral Daily PRN Brooke Dalal MD   sertraline 25 mg Oral HS nAton Shrestha MD   torsemide 40 mg Oral Daily NATHEN Escamilla   trolamine salicylate 1 application Topical 4x Daily NATHEN Lara Mai      M*Familonet software was used to dictate this note  It may contain errors with dictating incorrect words or incorrect spelling  Please contact the provider directly with any questions

## 2019-11-03 NOTE — ASSESSMENT & PLAN NOTE
No CPAP at home  Last sleep study was approx 5 years ago  Did not wear home CPAP  Was wearing during hospitalization  Patient had an overnight pulse oximetry on 10/28 on 2 L nasal cannula  Patient's pulse oximetry was 90-99% 86 6% of the time and 100% 13 4% of the time  Continue 2L NC @ night  Discussed with Dr Sánchez Mail will be planning on noctural POX Sunday night on RA for approval for home O2   Patient is also being followed by pulmonology now and will need to follow up outpatient for sleep study for home CPAP  Reviewed pulm consult      Discussed with therapy pt had ambulatory Pox on RA this am and sat 91%

## 2019-11-03 NOTE — ASSESSMENT & PLAN NOTE
Secondary to a fall 10/8  Patient was admitted to [address] [place] [address] 10/8 through 10/10  She was evaluated by Neurosurgery who recommended conservative management of SDH

## 2019-11-03 NOTE — ASSESSMENT & PLAN NOTE
Bioprosthetic valve  Last echo was 10/21/2019  Showing moderate left ventricle systolic dysfunction with an EF 41%  Severe hypokinesis to akinesis in the distal half of the anterior, lateral and inferior walls with akinesis of the distal 3rd of the septum  The apex is akinetic to paradoxical  The  base of the heart contracts vigorously  Although these findings could be secondary to coronary artery disease, they also are consistent with Takotsubo's syndrome  Grade 2 left ventricle diastolic dysfunction  Moderate to severe MR  Calcified tricuspid aortic valve  Moderate TR    Mild [place]

## 2019-11-03 NOTE — PROGRESS NOTES
11/03/19 0700   Pain Assessment   Pain Assessment 0-10   Pain Score 4   Pain Type Chronic pain   Pain Location Neck; Shoulder   Pain Orientation Bilateral   Restrictions/Precautions   Precautions Fall Risk;Fluid restriction;Hard of hearing   Weight Bearing Restrictions No   ROM Restrictions No   Eating   Type of Assistance Needed Independent   Amount of Physical Assistance Provided No physical assistance   Eating CARE Score 6   Oral Hygiene   Type of Assistance Needed Supervision   Amount of Physical Assistance Provided No physical assistance   Comment Supervision while in stance at sink  Oral Hygiene CARE Score 4   Shower/Bathe Self   Type of Assistance Needed Physical assistance   Amount of Physical Assistance Provided Less than 25%   Comment Pt continues to require Deondre for rear hygiene thoroughness which pt reports family will A with  Pt is able to complete all other portions with increased time and use of LH reacher to bathe BLEs  PICC protected with water proof covering throughout shower, remained dry and in tact  Shower/Bathe Self CARE Score 3   Bathing   Assessed Bath Style Shower   Anticipated D/C Bath Style Shower   Able to [place] Vidal Yes   Able to [employer] Temperature No   Able to Wash/Rinse/Dry (body part) Left Arm;Right Arm;L Upper Leg;R Upper Leg;Chest;Abdomen;Perineal Area;L Lower Leg/Foot;R Lower Leg/Foot   Limitations Noted in Balance;ROM;Strength; Endurance;Timeliness   Positioning Seated;Standing   Adaptive Equipment Tub Bench; Shower Bars;Hand Held Leon Morris   Findings Pt requires CS for shower stall transfer with use of grab bars and tub transfer bench  Pt reports having both at home      Upper Body Dressing   Type of Assistance Needed Supervision   Amount of Physical Assistance Provided No physical assistance   Upper Body Dressing CARE Score 4   Lower Body Dressing   Type of Assistance Needed Supervision; Adaptive equipment   Amount of Physical Assistance Provided No physical assistance   Comment Use of LHAE to thread undergarments and pants over BLEs and then able to complete CM up/down over hips with CS using alternating UE release  Lower Body Dressing CARE Score 4   Putting On/Taking Off Footwear   Type of Assistance Needed Physical assistance   Amount of Physical Assistance Provided Less than 25%   Comment Pt requires A for donning b/l compression socks, completes all other portions with use of LHAE with G carryover of technique  No SOB  Completed while seated at EOB  Putting On/Taking Off Footwear CARE Score 3   Picking Up Object   Type of Assistance Needed Supervision; Adaptive equipment   Amount of Physical Assistance Provided No physical assistance   Comment Use of LH reacher  Picking Up Object CARE Score 4   Dressing/Undressing Clothing   Remove UB Clothes Pullover Shirt   Don UB Clothes Pullover Shirt;Zahraa   Remove LB Clothes Socks; Undergarment;Pants   Don LB Clothes Pants; Undergarment;Socks; Shoes   Limitations Noted In Endurance;Strength;ROM   Adaptive Equipment Reacher;Dressing Stick   Positioning Sit Edge Of Bed;Standing   Bed-Chair Transfer   Type of Assistance Needed Supervision; Adaptive equipment   Amount of Physical Assistance Provided No physical assistance   Comment CS with RW  Extended time for task completion  Chair/Bed-to-Chair Transfer CARE Score 4   Toileting Hygiene   Type of Assistance Needed Physical assistance   Amount of Physical Assistance Provided Less than 25%   Comment Pt continues to require A for rear hygiene  Reports family will assist with this at home  Educated on tongs/toileting aides  Toileting Hygiene CARE Score 3   Toilet Transfer   Type of Assistance Needed Supervision; Adaptive equipment   Amount of Physical Assistance Provided No physical assistance   Toilet Transfer CARE Score 4   Cognition   Overall Cognitive Status [facility] Arousal/Participation Alert; Cooperative   Attention Within functional limits   Orientation Level Oriented X4   Memory Decreased short term memory   Following Commands Follows multistep commands with increased time or repetition   Activity Tolerance   Activity Tolerance Patient tolerated treatment well   Assessment   Treatment Assessment Pt participated in skilled OT services with focus on ADL retraining  Pt overall has made G progress towards LTGs  Pt continues to require Deondre for bathing and toileting hygiene 2* to decreased UE ROM to complete thorough hygiene of rear  Provided education on compensatory techniques, however, pt reports family will A at time of d/c  Pt completes all other portions of ADL tasks at a CS level with increased time for task completion and LHAE, RW necessary for energy conservation  Pt remains on RA throughout duration of session, does present with wheezing, however, SPO2 on RA remains >94% throughout entire session  Pt encourage to take frequent rest breaks to manage fatigue  Pt to d/c home with family support/A  Prognosis Good   Problem List Decreased strength;Decreased range of motion;Decreased endurance; Impaired balance;Decreased mobility   Recommendation   OT Discharge Recommendation 24 hour supervision/assist   OT Therapy Minutes   OT Time In 0700   OT Time Out 0830   OT Total Time (minutes) 90   OT Mode of treatment - Individual (minutes) 90   OT Mode of treatment - Concurrent (minutes) 0   OT Mode of treatment - Group (minutes) 0   OT Mode of treatment - Co-treat (minutes) 0   OT Mode of Treatment - Total time(minutes) 90 minutes   OT Cumulative Minutes 510   Therapy Time missed   Time missed?  No

## 2019-11-03 NOTE — ASSESSMENT & PLAN NOTE
Sodium is stable at this time  Mild decrease and asymptomatic  Continue with fluid restrictions    Repeat BMP in am

## 2019-11-03 NOTE — ASSESSMENT & PLAN NOTE
Bioprosthetic valve  Last echo was 10/21/2019  Showing moderate left ventricle systolic dysfunction with an EF 41%  Severe hypokinesis to akinesis in the distal half of the anterior, lateral and inferior walls with akinesis of the distal 3rd of the septum  The apex is akinetic to paradoxical  The  base of the heart contracts vigorously  Although these findings could be secondary to coronary artery disease, they also are consistent with Takotsubo's syndrome  Grade 2 left ventricle diastolic dysfunction  Moderate to severe MR  Calcified tricuspid aortic valve  Moderate TR    Mild KS

## 2019-11-03 NOTE — NURSING NOTE
Patient wheezing at times inhaler used and also resperatory therapist did pulse ox walking per him she did not desat

## 2019-11-03 NOTE — ASSESSMENT & PLAN NOTE
No CPAP at home  Last sleep study was approx 5 years ago  Did not wear home CPAP  Was wearing during hospitalization  Patient had an overnight pulse oximetry on 10/28 on 2 L nasal cannula  Patient's pulse oximetry was 90-99% 86 6% of the time and 100% 13 4% of the time  Continue 2L NC @ night  Discussed with Dr Kassidy Woods will be planning on noctural POX Sunday night on RA for approval for home O2   Patient is also being followed by pulmonology now and will need to follow up outpatient for sleep study for home CPAP  Reviewed pulm consult      Discussed with therapy pt had ambulatory Pox on RA this am and sat 91%

## 2019-11-03 NOTE — ASSESSMENT & PLAN NOTE
Reviewed Nephrology note  Patient with a baseline creatinine of 1 5-1 6  Her torsemide was reduced secondary to increasing renal function  She does need to have been repeat renal artery ultrasound secondary to history of renal artery stenosis  It was attempted to be completed during his recent hospitalization but was unable to be secondary to discomfort  Torsemide was increased by Dr Clarke Laws to 40mg daily  Creatinine is continuing to improve    1 95--> 2 08 --> 1 99 --> 1 84 --> 1 76 --> 1 58--> 1 69  Repeat BMP in am

## 2019-11-04 PROBLEM — R06.2 WHEEZING: Status: ACTIVE | Noted: 2019-11-04

## 2019-11-04 LAB
ANION GAP SERPL CALCULATED.3IONS-SCNC: 12 MMOL/L (ref 5–14)
BASOPHILS # BLD AUTO: 0.1 THOUSANDS/ΜL (ref 0–0.1)
BASOPHILS NFR BLD AUTO: 1 % (ref 0–1)
BUN SERPL-MCNC: 69 MG/DL (ref 5–25)
CALCIUM SERPL-MCNC: 9.4 MG/DL (ref 8.4–10.2)
CHLORIDE SERPL-SCNC: 90 MMOL/L (ref 97–108)
CO2 SERPL-SCNC: 32 MMOL/L (ref 22–30)
CREAT SERPL-MCNC: 1.66 MG/DL (ref 0.6–1.2)
EOSINOPHIL # BLD AUTO: 0.1 THOUSAND/ΜL (ref 0–0.4)
EOSINOPHIL NFR BLD AUTO: 2 % (ref 0–6)
ERYTHROCYTE [DISTWIDTH] IN BLOOD BY AUTOMATED COUNT: 16.6 %
GFR SERPL CREATININE-BSD FRML MDRD: 28 ML/MIN/1.73SQ M
GLUCOSE P FAST SERPL-MCNC: 153 MG/DL (ref 70–99)
GLUCOSE SERPL-MCNC: 153 MG/DL (ref 70–99)
HCT VFR BLD AUTO: 27 % (ref 36–46)
HGB BLD-MCNC: 8.9 G/DL (ref 12–16)
LYMPHOCYTES # BLD AUTO: 1.5 THOUSANDS/ΜL (ref 0.5–4)
LYMPHOCYTES NFR BLD AUTO: 23 % (ref 25–45)
MCH RBC QN AUTO: 32.9 PG (ref 26.8–34.3)
MCHC RBC AUTO-ENTMCNC: 33 G/DL (ref 31.4–37.4)
MCV RBC AUTO: 100 FL (ref 80–100)
MONOCYTES # BLD AUTO: 0.5 THOUSAND/ΜL (ref 0.2–0.9)
MONOCYTES NFR BLD AUTO: 7 % (ref 1–10)
NEUTROPHILS # BLD AUTO: 4.4 THOUSANDS/ΜL (ref 1.8–7.8)
NEUTS SEG NFR BLD AUTO: 68 % (ref 45–65)
NT-PROBNP SERPL-MCNC: ABNORMAL PG/ML (ref 0–299)
PLATELET # BLD AUTO: 233 THOUSANDS/UL (ref 150–450)
PMV BLD AUTO: 7.6 FL (ref 8.9–12.7)
POTASSIUM SERPL-SCNC: 4.4 MMOL/L (ref 3.6–5)
RBC # BLD AUTO: 2.71 MILLION/UL (ref 4–5.2)
SODIUM SERPL-SCNC: 134 MMOL/L (ref 137–147)
WBC # BLD AUTO: 6.5 THOUSAND/UL (ref 4.31–10.16)

## 2019-11-04 PROCEDURE — 99232 SBSQ HOSP IP/OBS MODERATE 35: CPT | Performed by: INTERNAL MEDICINE

## 2019-11-04 PROCEDURE — 97110 THERAPEUTIC EXERCISES: CPT

## 2019-11-04 PROCEDURE — 97530 THERAPEUTIC ACTIVITIES: CPT

## 2019-11-04 PROCEDURE — 94760 N-INVAS EAR/PLS OXIMETRY 1: CPT

## 2019-11-04 PROCEDURE — 99232 SBSQ HOSP IP/OBS MODERATE 35: CPT | Performed by: PHYSICAL MEDICINE & REHABILITATION

## 2019-11-04 PROCEDURE — 83880 ASSAY OF NATRIURETIC PEPTIDE: CPT | Performed by: NURSE PRACTITIONER

## 2019-11-04 PROCEDURE — 97116 GAIT TRAINING THERAPY: CPT

## 2019-11-04 PROCEDURE — 94664 DEMO&/EVAL PT USE INHALER: CPT

## 2019-11-04 PROCEDURE — 85025 COMPLETE CBC W/AUTO DIFF WBC: CPT | Performed by: NURSE PRACTITIONER

## 2019-11-04 PROCEDURE — 99233 SBSQ HOSP IP/OBS HIGH 50: CPT | Performed by: INTERNAL MEDICINE

## 2019-11-04 PROCEDURE — 80048 BASIC METABOLIC PNL TOTAL CA: CPT | Performed by: NURSE PRACTITIONER

## 2019-11-04 PROCEDURE — 94640 AIRWAY INHALATION TREATMENT: CPT

## 2019-11-04 PROCEDURE — 97535 SELF CARE MNGMENT TRAINING: CPT

## 2019-11-04 RX ORDER — ALBUTEROL SULFATE 2.5 MG/3ML
2.5 SOLUTION RESPIRATORY (INHALATION) EVERY 6 HOURS
Status: DISCONTINUED | OUTPATIENT
Start: 2019-11-04 | End: 2019-11-04

## 2019-11-04 RX ORDER — METOLAZONE 5 MG/1
5 TABLET ORAL DAILY
Status: DISCONTINUED | OUTPATIENT
Start: 2019-11-04 | End: 2019-11-05

## 2019-11-04 RX ORDER — ALBUTEROL SULFATE 2.5 MG/3ML
2.5 SOLUTION RESPIRATORY (INHALATION)
Status: DISCONTINUED | OUTPATIENT
Start: 2019-11-04 | End: 2019-11-06 | Stop reason: HOSPADM

## 2019-11-04 RX ADMIN — CLOPIDOGREL BISULFATE 75 MG: 75 TABLET, FILM COATED ORAL at 08:20

## 2019-11-04 RX ADMIN — FUROSEMIDE 80 MG: 10 INJECTION, SOLUTION INTRAMUSCULAR; INTRAVENOUS at 04:10

## 2019-11-04 RX ADMIN — HEPARIN SODIUM 5000 UNITS: 5000 INJECTION INTRAVENOUS; SUBCUTANEOUS at 06:16

## 2019-11-04 RX ADMIN — TROLAMINE SALICYLATE 1 APPLICATION: 10 CREAM TOPICAL at 22:26

## 2019-11-04 RX ADMIN — SERTRALINE 25 MG: 25 TABLET, FILM COATED ORAL at 22:25

## 2019-11-04 RX ADMIN — FUROSEMIDE 80 MG: 10 INJECTION, SOLUTION INTRAMUSCULAR; INTRAVENOUS at 17:29

## 2019-11-04 RX ADMIN — LIDOCAINE 2 PATCH: 50 PATCH CUTANEOUS at 08:21

## 2019-11-04 RX ADMIN — ACETAMINOPHEN 650 MG: 325 TABLET ORAL at 04:22

## 2019-11-04 RX ADMIN — PANTOPRAZOLE 20 MG: 20 TABLET, DELAYED RELEASE ORAL at 06:16

## 2019-11-04 RX ADMIN — ISOSORBIDE MONONITRATE 30 MG: 30 TABLET, EXTENDED RELEASE ORAL at 08:20

## 2019-11-04 RX ADMIN — LEVOTHYROXINE SODIUM 112 MCG: 112 TABLET ORAL at 06:16

## 2019-11-04 RX ADMIN — METOPROLOL TARTRATE 25 MG: 25 TABLET ORAL at 22:24

## 2019-11-04 RX ADMIN — ACETAMINOPHEN 650 MG: 325 TABLET ORAL at 17:43

## 2019-11-04 RX ADMIN — TROLAMINE SALICYLATE 1 APPLICATION: 10 CREAM TOPICAL at 08:22

## 2019-11-04 RX ADMIN — METOPROLOL TARTRATE 25 MG: 25 TABLET ORAL at 08:20

## 2019-11-04 RX ADMIN — METHOCARBAMOL 500 MG: 500 TABLET ORAL at 08:20

## 2019-11-04 RX ADMIN — HEPARIN SODIUM 5000 UNITS: 5000 INJECTION INTRAVENOUS; SUBCUTANEOUS at 14:22

## 2019-11-04 RX ADMIN — GABAPENTIN 300 MG: 300 CAPSULE ORAL at 17:28

## 2019-11-04 RX ADMIN — NYSTATIN: 100000 POWDER TOPICAL at 17:31

## 2019-11-04 RX ADMIN — GABAPENTIN 300 MG: 300 CAPSULE ORAL at 08:20

## 2019-11-04 RX ADMIN — ALBUTEROL SULFATE 2 PUFF: 90 AEROSOL, METERED RESPIRATORY (INHALATION) at 08:22

## 2019-11-04 RX ADMIN — GABAPENTIN 300 MG: 300 CAPSULE ORAL at 22:24

## 2019-11-04 RX ADMIN — ALBUTEROL SULFATE 2.5 MG: 2.5 SOLUTION RESPIRATORY (INHALATION) at 20:07

## 2019-11-04 RX ADMIN — HEPARIN SODIUM 5000 UNITS: 5000 INJECTION INTRAVENOUS; SUBCUTANEOUS at 22:25

## 2019-11-04 RX ADMIN — METOLAZONE 5 MG: 5 TABLET ORAL at 10:02

## 2019-11-04 RX ADMIN — METHOCARBAMOL 500 MG: 500 TABLET ORAL at 17:43

## 2019-11-04 RX ADMIN — ALBUTEROL SULFATE 2.5 MG: 2.5 SOLUTION RESPIRATORY (INHALATION) at 10:19

## 2019-11-04 RX ADMIN — NYSTATIN: 100000 POWDER TOPICAL at 08:22

## 2019-11-04 NOTE — PROGRESS NOTES
Progress Note - Jacqueline Cornejo 1934, 80 y o  female MRN: [id number]    Unit/Bed#: [facility] 268-02 Encounter: [id number]    Primary Care Provider: Timothy Vang DO   Date and time admitted to hospital: 10/28/2019  1:22 PM        Wheezing  Assessment & Plan  IMPRESSION:     Continued evidence of pulmonary vascular congestion with small effusions and left basilar subsegmental atelectasis     PICC line tip in the brachiocephalic vein projecting over the aortic arch  Discussed with nursing staff will increase patient's nebulizers to q 6 hours, patient also has as needed albuterol inhaler  Patient needs overnight pulse ox study  Renal artery stenosis [facility])  Assessment & Plan  Due for repeat renal u/s  Was unable to be completed during hospitalization secondary to patient discomfort  Can be coordinated as OP    Type 2 MI (myocardial infarction) [facility])  Assessment & Plan  Secondary to demand ischemia due to acute on chronic CHF exacerbation  Imdur started    Controlled type 2 diabetes mellitus with diabetic neuropathy, without long-term current use of insulin ([facility]  Assessment & Plan  Lab Results   Component Value Date    HGBA1C 5 0 08/16/2019       No results for input(s): POCGLU in the last 72 hours  Blood Sugar Average: Last 72 hrs:  Blood sugars were well controlled during her hospitalization  Her last A1c was 5 0  She did not require any insulin during hospitalization  Recommend diabetic diet      Hyperlipidemia  Assessment & Plan  No statin secondary to significant myalgias  Ideally patient should be on a statin given her hx  Last lipid panel 10/23/2019: Total cholesterol 144  Triglycerides 223  HDL 33  LDL 66  AZUL (obstructive sleep apnea)  Assessment & Plan  No CPAP at home  Last sleep study was approx 5 years ago  Did not wear home CPAP  Was wearing during hospitalization  Patient had an overnight pulse oximetry on 10/28 on 2 L nasal cannula    Patient's pulse oximetry was 90-99% 86 6% of the time and 100% 13 4% of the time  Continue 2L NC @ night  Discussed with Dr Lm Gupta will be planning on noctural POX Sunday night on RA for approval for home O2   Patient is also being followed by pulmonology now and will need to follow up outpatient for sleep study for home CPAP  Reviewed pulm consult  pt had ambulatory Pox on RA  sat 91%    Chronic anemia  Assessment & Plan  Her hemoglobin did drop from 8 1-->7 6  Patient was experiencing some mild shortness of breath and transfused for symptomatic anemia  Patient is status post transfusion 10/30 she received 1 unit of PRBCs     Hgb 11/4 8 9  Continue to transfuse with hemoglobin < 7 0  Acute-on-chronic kidney injury [facility])  Assessment & Plan  Reviewed Nephrology note  Patient with a baseline creatinine of 1 5-1 6  Her torsemide was discontinued,  Patient started on Lasix and Zaroxolyn  She does need to have been repeat renal artery ultrasound secondary to history of renal artery stenosis  It was attempted to be completed during his recent hospitalization but was unable to be secondary to discomfort  Creatinine is continuing to improve  1 95--> 2 08 --> 1 99 --> 1 84 --> 1 76 --> 1 58--> 1 69-->1 66     Will get BMP in the morning, continue to monitor potassium may need to supplement  Hyponatremia  Assessment & Plan  Sodium is stable at this time  Mild decrease and asymptomatic  Continue with fluid restrictions  NA-->134    History of aortic valve replacement  Assessment & Plan  Bioprosthetic valve  Last echo was 10/21/2019  Showing moderate left ventricle systolic dysfunction with an EF 41%  Severe hypokinesis to akinesis in the distal half of the anterior, lateral and inferior walls with akinesis of the distal 3rd of the septum  The apex is akinetic to paradoxical  The  base of the heart contracts vigorously   Although these findings could be secondary to coronary artery disease, they also are consistent with Takotsubo's syndrome  Grade 2 left ventricle diastolic dysfunction  Moderate to severe MR  Calcified tricuspid aortic valve  Moderate TR  Mild LA    Depression  Assessment & Plan  Stable  Continue sertraline 25 mg daily    Acute on chronic diastolic heart failure (HCC)  Assessment & Plan  Wt Readings from Last 3 Encounters:   11/04/19 84 3 kg (185 lb 12 8 oz)   10/28/19 76 kg (167 lb 8 8 oz)   10/27/19 81 9 kg (180 lb 8 9 oz)     Patient with acute on chronic diastolic CHF  She did require BiPAP, IV diuresis during her ICU stay        Recommend daily weights with the same scale  Weights have been on different scales and therefore unreliable - discussed with nursing to have same scale daily      Last echo was 10/21/2019  Showing moderate left ventricle systolic dysfunction with an EF 41%  Severe hypokinesis to akinesis in the distal half of the anterior, lateral and inferior walls with akinesis of the distal 3rd of the septum  The apex is akinetic to paradoxical  The  base of the heart contracts vigorously  Although these findings could be secondary to coronary artery disease, they also are consistent with Takotsubo's syndrome  Grade 2 left ventricle diastolic dysfunction  Moderate to severe MR  Calcified tricuspid aortic valve  Moderate TR  Mild LA     Pt does not appear fluid overload  Weights relatively stable and last 3 weights have been on same scale  Continue fluid restrictions  Trace bilateral lower extremity edema  Discussed with patient continue compression stockings and elevation of lower extremities  She is having some SOB, HERNANDEZ this am - improved from yesterday  Patient was seen by Nephrology today  Patient was started on Lasix 80 mg twice daily,  Was also restarted on Zaroxolyn 5mg  She does c/o wheezing and she is noted to have diffuse wheezing, no crackles  She received a neb with improvment  Will order neb  Q 6 hours    Should have home neb arranged      IS given by therapy today and encouraged use     BNP 37031  Chest xray done today        Essential hypertension  Assessment & Plan  BP controlled  Continue metoprolol 12 5 mg b i d  Demadex d/c by Nephrology, Lasix 80 mg q 12 hours, and Zaroxolyn 5 mg tablet dailyimdur 30mg added   Follow BP for now with recent changes  Caution on adjustments with change in diuretic and imdur with impending d/c home Wed  H/O: CVA (cerebrovascular accident)  Assessment & Plan  Plavix resumed    Hypothyroidism  Assessment & Plan   Continue levothyroxine 112 mcg daily  TSH within normal limits 10/21/2019    * SDH (subdural hematoma) [facility])  Assessment & Plan  Secondary to a fall 10/8  Patient was admitted to [facility] 10/8 through 10/10  She was evaluated by Neurosurgery who recommended conservative management of SDH  VTE Pharmacologic Prophylaxis:   Pharmacologic: Heparin Drip  Mechanical VTE Prophylaxis in Place: Yes    Current Length of Stay: 7 day(s)    Current Patient Status: Inpatient Rehab     Discharge Plan: As per treatment team     Code Status: Level 1 - Full Code    Subjective:     Patient seen today seated in wheelchair  Patient did experience shortness of breath as well as wheezing  Patient currently denies chest pain palpitations dizziness and lightheadedness  Patient denies abdominal pain, nausea, vomiting, no difficulties with urination  Patient is currently maintaining fluid restrictions  Nursing staff does state the patient appears to be short of breath  Patient was seen by Nephrology today  Objective:     Vitals:   Temp (24hrs), Av 3 °F (36 3 °C), Min:96 7 °F (35 9 °C), Max:98 1 °F (36 7 °C)    Temp:  [96 7 °F (35 9 °C)-98 1 °F (36 7 °C)] 96 7 °F (35 9 °C)  HR:  [68-70] 69  Resp:  [18-24] 24  BP: (118-134)/(59-63) 118/59  SpO2:  [93 %-97 %] 93 %  Body mass index is 37 53 kg/m²  Review of Systems   Constitutional: Negative for appetite change, chills, fatigue, fever and unexpected weight change  Respiratory: Positive for shortness of breath and wheezing  Negative for cough and chest tightness  Cardiovascular: Positive for leg swelling (stable  denies calf pain)  Negative for chest pain and palpitations  Gastrointestinal: Negative for abdominal pain, constipation, diarrhea, nausea and vomiting  Genitourinary: Negative for difficulty urinating and dysuria  Musculoskeletal: Positive for arthralgias (posterior shoulder) and back pain  Negative for myalgias and neck stiffness  Neurological: Negative for dizziness, syncope, light-headedness and headaches         + restless leg   Psychiatric/Behavioral: The patient is nervous/anxious  Input and Output Summary (last 24 hours): Intake/Output Summary (Last 24 hours) at 11/4/2019 1412  Last data filed at 11/4/2019 1330  Gross per 24 hour   Intake 1000 ml   Output 1325 ml   Net -325 ml       Physical Exam:     Physical Exam   Constitutional: She is oriented to person, place, and time  She appears well-developed and well-nourished  No distress  Obese   Neck: No JVD present  Cardiovascular: Normal rate, regular rhythm and normal heart sounds  No murmur heard  Trace B/L LE edema  Compression socks in place   Pulmonary/Chest: Effort normal  No respiratory distress  She has wheezes (diffuse expiratory)  She has no rales  Neurological: She is alert and oriented to person, place, and time  No focal deficits   Skin: Skin is warm and dry  Psychiatric: She has a normal mood and affect  Her behavior is normal  Judgment and thought content normal    Nursing note and vitals reviewed        Additional Data:     Labs:    Results from last 7 days   Lab Units 11/04/19  0429   WBC Thousand/uL 6 50   HEMOGLOBIN g/dL 8 9*   HEMATOCRIT % 27 0*   PLATELETS Thousands/uL 233   NEUTROS PCT % 68*   LYMPHS PCT % 23*   MONOS PCT % 7   EOS PCT % 2     Results from last 7 days   Lab Units 11/04/19  0429   SODIUM mmol/L 134*   POTASSIUM mmol/L 4 4   CHLORIDE mmol/L 90*   CO2 mmol/L 32*   BUN mg/dL 69*   CREATININE mg/dL 1 66*   ANION GAP mmol/L 12   CALCIUM mg/dL 9 4   GLUCOSE RANDOM mg/dL 153*                       Labs reviewed    Imaging:    Imaging reviewed    Recent Cultures (last 7 days):           Last 24 Hours Medication List:     Current Facility-Administered Medications:  acetaminophen 650 mg Oral Q6H PRN Anton Shrestha MD   albuterol 2 puff Inhalation Q4H PRN Anton Shrestha MD   albuterol 2 5 mg Nebulization TID Korin Lee MD   clopidogrel 75 mg Oral Daily Korin eLe MD   furosemide 80 mg Intravenous Q12H Tahmina Murcia DO   gabapentin 300 mg Oral TID Korin Lee MD   heparin (porcine) 5,000 Units Subcutaneous Q8H [address] Anton Shrestha MD   isosorbide mononitrate 30 mg Oral Daily NAHTEN Escamilla   levothyroxine 112 mcg Oral Daily Anton Shrestha MD   lidocaine 3 patch Topical Daily PRN Anton Brito MD   menthol-methyl salicylate  Apply externally 4x Daily PRN Anton Shrestha MD   methocarbamol 500 mg Oral Q6H PRN Anton Shrestha MD   metolazone 5 mg Oral Daily Alyssia Robledo MD   metoprolol tartrate 25 mg Oral Q12H [address] Kidd MD   nystatin  Topical BID Anton Shrestha MD   pantoprazole 20 mg Oral Daily Before Breakfast Norberto Mock MD   polyethylene glycol 17 g Oral Daily PRN Anton Shrestha MD   sertraline 25 mg Oral HS Anton Shrestha MD   trolamine salicylate 1 application Topical 4x Daily NATHEN Sapp        M*Quantified Skin software was used to dictate this note  It may contain errors with dictating incorrect words or incorrect spelling  Please contact the provider directly with any questions

## 2019-11-04 NOTE — PROGRESS NOTES
Follow up Consultation    Nephrology   Josiane Leader 80 y o  female MRN: [id number]  Unit/Bed#: [facility] 268-02 Encounter: [id number]      Physician Requesting Consult: Yanely Moore MD  Reason for Consult:  Acute kidney injury       ASSESSMENT/PLAN:    1)Acute kidney injury (POA) on CKD stage 3:  - HERLINDA most likely secondary to ischemic injury from hemodynamic perturbations plus renal venous congestion  - After review of records it appears that the patient has a baseline Creatinine of 1 5-1 6 mg/dL  - patient's creatinine today is at 1 66 mg/dL  Creatinine stable and back to baseline  - Avoid nephrotoxins, adjust meds to appropriate GFR   - Acid base and lytes stable except mild hyponatremia and alkalosis  - clinically patient appears to be minimally hypervolemic  - give metolazone 5mg po Q24 for now  - concern for needing imaging for renal artery stenosis  Patient has a history of a stent on 1 side  At this time she is not able to get vascular Dopplers imaging, MRA was ordered however discussed with the patient the risks for NSF in light of acute kidney injury and that performing the test will not change her current management  She is agreeable and does not wish to go ahead with MRA   - Clinically patient is not uremic and there is no acute indication for renal replacement therapy (dialysis)  - Optimize hemodynamic status to avoid delay in renal recovery  - check BMP, magnesium, phosphorus in a m   - Await renal recovery  - Place on a renal diet when allowed diet order    - Strict I/O  2)Blood pressure/hypertension:  - Optimize hemodynamics   - Maintain MAP > 65mmHg  - Avoid BP fluctuations  - on metoprolol 25 mg p o  B i d  Imdur 30 mg p o  Q day    3)H/H/anemia:  - most recent hemoglobin at 8 9 grams/deciliter  - maintain hemoglobin greater than 8 grams/deciliter    4)Volume status:  - Clinically patient appears to be euvolemic to minimally hypervolemic  - on Lasix 80 mg IV b i d   Will add metolazone 5mg po Q24 from today, if stable will  change over to torsemide 40 mg p o  B i d from tomorrow  5)Hyponatremia:  - Likely due to decreased free water clearance  - Likely due to decreased distal sodium delivery  - Continue to monitor for now, most recent sodium 134 mEq improved from yesterday    6)CKD:  - Most likely secondary to renal vascular disease plus age-related nephron loss  - will arrange for follow up post hospitalization   - follows with Dr Nesha Fraire for nephrology as an outpatient    7) Subdural hematoma:  - management as per Primary team    8)CHF:  - Management as per primary team  - most recent EF of 41%  - follow-up with cardiology    Thanks for the consult  Will continue to follow  Please call with questions/ concerns  Above-mentioned orders and Plan with regards to acute kidney injury was discussed with the team in [address] MD, [facility], 2019, 7:32 AM              Objective :   Seen and examined in her room no overnight events had some wheezing late yesterday evening was given Lasix urine output close to 875 cc plus in last 24 hours  Remains afebrile  Hemodynamically stable  Still with SOB and wheezing      PHYSICAL EXAM  /61 (BP Location: Right arm)   Pulse 68   Temp (!) 97 2 °F (36 2 °C) (Tympanic)   Resp 22   Ht 4' 11" (1 499 m)   Wt 84 3 kg (185 lb 12 8 oz)   LMP  (LMP Unknown)   SpO2 95%   BMI 37 53 kg/m²   Temp (24hrs), Av 7 °F (36 5 °C), Min:97 2 °F (36 2 °C), Max:98 1 °F (36 7 °C)        Intake/Output Summary (Last 24 hours) at 2019 0732  Last data filed at 2019 0400  Gross per 24 hour   Intake 1080 ml   Output 875 ml   Net 205 ml       I/O last 24 hours: In: 1080 [P O :1080]  Out: 875 [Urine:875]      Current Weight: Weight - Scale: 84 3 kg (185 lb 12 8 oz)  First Weight: Weight - Scale: 83 9 kg (184 lb 15 5 oz)  Physical Exam   Constitutional: She is oriented to person, place, and time  She appears well-developed and well-nourished   No distress  HENT:   Head: Normocephalic and atraumatic  Mouth/Throat: Oropharynx is clear and moist  No oropharyngeal exudate  Eyes: Conjunctivae are normal  No scleral icterus  Neck: Neck supple  No JVD present  Cardiovascular: Normal heart sounds  Exam reveals no friction rub  Pulmonary/Chest: She has wheezes  She has rales  Abdominal: Soft  She exhibits no mass  There is no tenderness  Musculoskeletal: She exhibits edema  + 1 edema bilateral lower extremity   Neurological: She is alert and oriented to person, place, and time  Skin: Skin is warm  No rash noted  She is not diaphoretic  Psychiatric: She has a normal mood and affect  Her behavior is normal    Nursing note and vitals reviewed            Review of Systems    Scheduled Meds:    Current Facility-Administered Medications:  acetaminophen 650 mg Oral Q6H PRN Anton Shrestha MD   albuterol 2 puff Inhalation Q4H PRN Anton Shrestha MD   albuterol 2 5 mg Nebulization Q6H PRN NATHEN Escamilla   clopidogrel 75 mg Oral Daily Alan Lunsford MD   furosemide 80 mg Intravenous Q12H Roxana Murcia DO   gabapentin 300 mg Oral TID Alan Lunsford MD   heparin (porcine) 5,000 Units Subcutaneous Q8H [facility] Anton Shrestha MD   isosorbide mononitrate 30 mg Oral Daily NATHEN Escamilla   levothyroxine 112 mcg Oral Daily Anton Shrestha MD   lidocaine 3 patch Topical Daily PRN Anton Mireles MD   menthol-methyl salicylate  Apply externally 4x Daily PRN Anton Shrestha MD   methocarbamol 500 mg Oral Q6H PRN Anton Shrestha MD   metoprolol tartrate 25 mg Oral Q12H Sg Arreola MD   nystatin  Topical BID Anton Shrestha MD   pantoprazole 20 mg Oral Daily Before Breakfast Endy Brown MD   polyethylene glycol 17 g Oral Daily PRN Anton Shrestha MD   sertraline 25 mg Oral HS Atnon Shrestha MD   trolamine salicylate 1 application Topical 4x Daily NATHEN Escamilla       PRN Meds:   acetaminophen    albuterol   albuterol    lidocaine    menthol-methyl salicylate    methocarbamol    polyethylene glycol    Continuous Infusions:       Invasive Devices: Invasive Devices     Peripherally Inserted Central Catheter Line            PICC Line 10/22/19 Left Brachial 12 days                  LABORATORY:    Results from last 7 days   Lab Units 11/04/19  0429 11/03/19  0538 11/02/19  0553 11/01/19  0729 10/31/19  1300 10/31/19  0441 10/30/19  0538 10/29/19  0525   WBC Thousand/uL 6 50  --   --   --  5 90  --  5 20  --    HEMOGLOBIN g/dL 8 9*  --   --   --  9 3*  --  7 6*  --    HEMATOCRIT % 27 0*  --   --   --  27 4*  --  23 0*  --    PLATELETS Thousands/uL 233  --   --   --  205  --  197  --    POTASSIUM mmol/L 4 4 4 4 4 1 4 1  --  3 9 3 8 3 8   CHLORIDE mmol/L 90* 90* 91* 91*  --  90* 89* 89*   CO2 mmol/L 32* 35* 35* 34*  --  35* 36* 36*   BUN mg/dL 69* 70* 68* 68*  --  68* 72* 72*   CREATININE mg/dL 1 66* 1 69* 1 58* 1 76*  --  1 84* 1 99* 2 08*   CALCIUM mg/dL 9 4 9 1 9 2 9 5  --  9 0 8 9 8 8      rest all reviewed    RADIOLOGY:  XR chest pa & lateral   Final Result by Adrián Armijo MD (11/03 1932)      Continued evidence of pulmonary vascular congestion with small effusions and left basilar subsegmental atelectasis      PICC line tip in the brachiocephalic vein projecting over the aortic arch  The examination demonstrates a significant  finding and was documented as such in [facility] for liaison and referring practitioner notification  Workstation performed: [id number]           Rest all reviewed    Portions of the record may have been created with voice recognition software  Occasional wrong word or "sound a like" substitutions may have occurred due to the inherent limitations of voice recognition software  Read the chart carefully and recognize, using context, where substitutions have occurred  If you have any questions, please contact the dictating provider

## 2019-11-04 NOTE — ASSESSMENT & PLAN NOTE
Secondary to a fall 10/8  Patient was admitted to [facility] 10/8 through 10/10  She was evaluated by Neurosurgery who recommended conservative management of SDH

## 2019-11-04 NOTE — ASSESSMENT & PLAN NOTE
Her hemoglobin did drop from 8 1-->7 6  Patient was experiencing some mild shortness of breath and transfused for symptomatic anemia  Patient is status post transfusion 10/30 she received 1 unit of PRBCs     Hgb 11/4 8 9  Continue to transfuse with hemoglobin < 7 0

## 2019-11-04 NOTE — ASSESSMENT & PLAN NOTE
BP controlled  Continue metoprolol 12 5 mg b i d  Demadex d/c by Nephrology, Lasix 80 mg q 12 hours, and Zaroxolyn 5 mg tablet dailyimdur 30mg added 11/1  Follow BP for now with recent changes  Caution on adjustments with change in diuretic and imdur with impending d/c home Wed

## 2019-11-04 NOTE — SOCIAL WORK
left for Pts daughter, Aric Serra , informing her that the d/c for today has been delayed due to medical setbacks  CM will continue to keep Pts daughter informed  PCF Pts daughter, Han Parsons, stating that she will be in later today  She would like to speak to staff when she arrives re: Pts chest xray  Clinical update sent to [employer], attn: Patrcia Favre, requesting additional days for Pt  PCT [facility], [phone], and confirmed that Pt was receiving RN/PT/OT prior to admission, and her referral has been placed on hold  CM will notify them with a d/c date, when it is available, and then send any/all pertinent info over to fax# [phone]

## 2019-11-04 NOTE — ASSESSMENT & PLAN NOTE
Bioprosthetic valve  Last echo was 10/21/2019  Showing moderate left ventricle systolic dysfunction with an EF 41%  Severe hypokinesis to akinesis in the distal half of the anterior, lateral and inferior walls with akinesis of the distal 3rd of the septum  The apex is akinetic to paradoxical  The  base of the heart contracts vigorously  Although these findings could be secondary to coronary artery disease, they also are consistent with Takotsubo's syndrome  Grade 2 left ventricle diastolic dysfunction  Moderate to severe MR  Calcified tricuspid aortic valve  Moderate TR    Mild HI

## 2019-11-04 NOTE — ASSESSMENT & PLAN NOTE
IMPRESSION:     Continued evidence of pulmonary vascular congestion with small effusions and left basilar subsegmental atelectasis     PICC line tip in the brachiocephalic vein projecting over the aortic arch  Discussed with nursing staff will increase patient's nebulizers to q 6 hours, patient also has as needed albuterol inhaler  Patient needs overnight pulse ox study

## 2019-11-04 NOTE — PROGRESS NOTES
11/04/19 0830   Pain Assessment   Pain Assessment 0-10   Pain Score 3   Pain Type Chronic pain   Pain Location Shoulder   Pain Orientation Bilateral   Pain Descriptors Sore   Pain Onset Other (Comment)  (with ambulation due to inc reliance on BUE support )   Hospital Pain Intervention(s) Ambulation/increased activity;Repositioned;Medication (See MAR)  (nursing admin pain patches )   Response to Interventions 0/10 with  seated rest breaks    Restrictions/Precautions   Precautions Fall Risk;Fluid restriction   Weight Bearing Restrictions No   ROM Restrictions No   Cognition   Overall Cognitive Status WFL   Arousal/Participation Alert; Cooperative   Subjective   Subjective Pt cont to report SOB and wheezing, agreeable to PT session    Roll Left and Right   Reason if not Attempted Activity not applicable   Roll Left and Right CARE Score 9   Sit to Lying   Reason if not Attempted Activity not applicable   Sit to Lying CARE Score 9   Lying to Sitting on Side of Bed   Reason if not Attempted Activity not applicable   Lying to Sitting on Side of Bed CARE Score 9   Sit to Stand   Type of Assistance Needed Supervision; Adaptive equipment   Amount of Physical Assistance Provided No physical assistance   Sit to Stand CARE Score 4   Bed-Chair Transfer   Type of Assistance Needed Supervision; Adaptive equipment   Amount of Physical Assistance Provided No physical assistance   Chair/Bed-to-Chair Transfer CARE Score 4   Transfer Bed/Chair/Wheelchair   Limitations Noted In Balance; Endurance;UE Strength;LE Strength   Adaptive Equipment Roller Walker   Stand Pivot Supervision   Sit to Stand Supervision   Stand to [employer] inc time required to perform tasks, S with RW for functional trasnfers    Walk 10 Feet   Type of Assistance Needed Supervision; Adaptive equipment   Amount of Physical Assistance Provided No physical assistance   Comment CS w RW    Walk 10 Feet CARE Score 4   Walk 50 Feet with Two Turns   Type of Assistance Needed Supervision; Adaptive equipment   Amount of Physical Assistance Provided No physical assistance   Comment CS w RW    Walk 50 Feet with Two Turns CARE Score 4   Walk 150 Feet   Comment Limited by fatigue in BLE and UE    Reason if not Attempted Safety concerns   Walk 150 Feet CARE Score 88   Walking 10 Feet on Uneven Surfaces   Type of Assistance Needed Supervision; Adaptive equipment   Amount of Physical Assistance Provided No physical assistance   Comment Ramp in hallway with RW, CS    Walking 10 Feet on Uneven Surfaces CARE Score 4   Ambulation   Does the patient walk? 2  Yes   Primary Mode of Locomotion Prior to Admission Walk   Distance Walked (feet) 100 ft  (30x6 )   Assist Device Roller Walker   Gait Pattern Slow Jenny;Decreased foot clearance; Step through   Limitations Noted In Speed;Strength;Swing;Endurance;Balance   Walk Assist Level Close Supervision   Findings inc time requires, C/o inc shoulder pain with longer distance, kept ambulation to shorter HH distances due to shoulder pain and inc wheezing and SOB  SpO2 dropped to 91% when ambulating on ramp  Wheel 50 Feet with Two Turns   Reason if not Attempted Activity not applicable   Wheel 50 Feet with Two Turns CARE Score 9   Wheel 150 Feet   Reason if not Attempted Activity not applicable   Wheel 603 Feet CARE Score 9   Wheelchair mobility   Does the patient use a wheelchair? 0  No   Curb or Single Stair   Style negotiated Curb   Type of Assistance Needed Adaptive equipment; Incidental touching   Amount of Physical Assistance Provided Less than 25%   Comment CGA w RW 4" curb step to mimic step at home, perfromed x3    1 Step (Curb) CARE Score 3   4 Steps   Reason if not Attempted Safety concerns   4 Steps CARE Score 88   12 Steps   Reason if not Attempted Activity not applicable   12 Steps CARE Score 9   Stairs   Type Curb   # of Steps 3  (4" curb step w RW )   Weight Bearing Precautions Fall Risk   Assist Devices [place] Findings performed 4" curb step for stairs in house at home pt has step up into living room, son in law going to install grab bar    Picking Up Object   Type of Assistance Needed Supervision; Adaptive equipment   Amount of Physical Assistance Provided No physical assistance   Comment CS with RW and using reacher to pick cup up off ground level    Picking Up Object CARE Score 4   Therapeutic Interventions   Strengthening STSx5, BLE 2x10 seated TE with 2# during rest breaks to help pt catch her breath LAQ, Marching, hip ADD ball squeeze  Flexibility Seated BLE passive  HS and gastroc stretch x4min   Neuromuscular Re-Education Static standing at sink in room while perfroming brushing teeth and combing of hair  HH distance ambulation 30x6 in PT gym to practice shorter walks due to wheezing and SOB with longer distances    Other PLB technique education during seated rest breaks, pt with trouble remembering to breath in through nose and out through mouth  Other Comments   Comments SpO2 monitored t/o session with activity and at rest  Dropped to 91% on RA while ambulating on ramp, lowest during short distance ambulation was 93% on RA with short distance ambulation  Assessment   Treatment Assessment Pt participated in skilled PT session with focus on inc functional mobility, safe d/c planning, LE strengthening and conditioning  Cont to present with audible wheezing noted with activity and at rest  SpO2 monitored t/o as above, initially upon arrival to pts room 96% on RA, desat with ambulation to 91% on RA during ambulation on ramp  Pt cont to c/o  B/L shoulder pain with loger distance ambulation, educated on pacing, energy conservation and limiting walking to shorter distance in the home  Pt tentavily due to d/c wednesday pending medical status  Cont to benefit from skilled PT to inc strength, functional mobility, and act tolerance  Inc time required for functional mobility and trasnfer      Family/Caregiver Present No   Barriers to Discharge Comments Pt has ramp to enter and voulnteers from [facility] to stay with pt during the day along with hired aides  PT Barriers   Physical Impairment Decreased strength;Decreased endurance; Impaired balance;Decreased mobility;Pain;Decreased skin integrity;Obesity   Functional Limitation Car transfers; Ramp negotiation;Standing;Transfers; Walking   Plan   Treatment/Interventions Functional transfer training;LE strengthening/ROM; Elevations; Therapeutic exercise; Endurance training;Patient/family training;Equipment eval/education;Gait training   Progress Progressing toward goals   Recommendation   Recommendation Home PT; Home with family support   Equipment Recommended Walker   PT Therapy Minutes   PT Time In 0830   PT Time Out 1000   PT Total Time (minutes) 90   PT Mode of treatment - Individual (minutes) 90   PT Mode of treatment - Concurrent (minutes) 0   PT Mode of treatment - Group (minutes) 0   PT Mode of treatment - Co-treat (minutes) 0   PT Mode of Treatment - Total time(minutes) 90 minutes   PT Cumulative Minutes 570   Therapy Time missed   Time missed?  No

## 2019-11-04 NOTE — ASSESSMENT & PLAN NOTE
Reviewed Nephrology note  Patient with a baseline creatinine of 1 5-1 6  Her torsemide was discontinued,  Patient started on Lasix and Zaroxolyn  She does need to have been repeat renal artery ultrasound secondary to history of renal artery stenosis  It was attempted to be completed during his recent hospitalization but was unable to be secondary to discomfort  Creatinine is continuing to improve  1 95--> 2 08 --> 1 99 --> 1 84 --> 1 76 --> 1 58--> 1 69-->1 66     Will get BMP in the morning, continue to monitor potassium may need to supplement

## 2019-11-04 NOTE — PLAN OF CARE
Problem: CARDIOVASCULAR - ADULT  Goal: Maintains optimal cardiac output and hemodynamic stability  Description  INTERVENTIONS:  - Monitor I/O, vital signs and rhythm  - Monitor for S/S and trends of decreased cardiac output  - Administer and titrate ordered vasoactive medications to optimize hemodynamic stability  - Assess quality of pulses, skin color and temperature  - Assess for signs of decreased coronary artery perfusion  - Instruct patient to report change in severity of symptoms  Outcome: Progressing  Goal: Absence of cardiac dysrhythmias or at baseline rhythm  Description  INTERVENTIONS:  - Continuous cardiac monitoring, vital signs, obtain 12 lead EKG if ordered  - Administer antiarrhythmic and heart rate control medications as ordered  - Monitor electrolytes and administer replacement therapy as ordered  Outcome: Progressing     Problem: RESPIRATORY - ADULT  Goal: Achieves optimal ventilation and oxygenation  Description  INTERVENTIONS:  - Assess for changes in respiratory status  - Assess for changes in mentation and behavior  - Position to facilitate oxygenation and minimize respiratory effort  - Oxygen administered by appropriate delivery if ordered  - Initiate smoking cessation education as indicated  - Encourage broncho-pulmonary hygiene including cough, deep breathe, Incentive Spirometry  - Assess the need for suctioning and aspirate as needed  - Assess and instruct to report SOB or any respiratory difficulty  - Respiratory Therapy support as indicated  Outcome: Progressing     Problem: GASTROINTESTINAL - ADULT  Goal: Maintains adequate nutritional intake  Description  INTERVENTIONS:  - Monitor percentage of each meal consumed  - Identify factors contributing to decreased intake, treat as appropriate  - Assist with meals as needed  - Monitor I&O, weight, and lab values if indicated  - Obtain nutrition services referral as needed  Outcome: Progressing     Problem: METABOLIC, FLUID AND ELECTROLYTES - ADULT  Goal: Electrolytes maintained within normal limits  Description  INTERVENTIONS:  - Monitor labs and assess patient for signs and symptoms of electrolyte imbalances  - Administer electrolyte replacement as ordered  - Monitor response to electrolyte replacements, including repeat lab results as appropriate  - Instruct patient on fluid and nutrition as appropriate  Outcome: Progressing  Goal: Fluid balance maintained  Description  INTERVENTIONS:  - Monitor labs   - Monitor I/O and WT  - Instruct patient on fluid and nutrition as appropriate  - Assess for signs & symptoms of volume excess or deficit  Outcome: Progressing  Goal: Glucose maintained within target range  Description  INTERVENTIONS:  - Monitor Blood Glucose as ordered  - Assess for signs and symptoms of hyperglycemia and hypoglycemia  - Administer ordered medications to maintain glucose within target range  - Assess nutritional intake and initiate nutrition service referral as needed  Outcome: Progressing     Problem: SKIN/TISSUE INTEGRITY - ADULT  Goal: Skin integrity remains intact  Description  INTERVENTIONS  - Identify patients at risk for skin breakdown  - Assess and monitor skin integrity  - Assess and monitor nutrition and hydration status  - Monitor labs (i e  albumin)  - Assess for incontinence   - Turn and reposition patient  - Assist with mobility/ambulation  - Relieve pressure over bony prominences  - Avoid friction and shearing  - Provide appropriate hygiene as needed including keeping skin clean and dry  - Evaluate need for skin moisturizer/barrier cream  - Collaborate with interdisciplinary team (i e  Nutrition, Rehabilitation, etc )   - Patient/family teaching  Outcome: Progressing  Goal: Oral mucous membranes remain intact  Description  INTERVENTIONS  - Assess oral mucosa and hygiene practices  - Implement preventative oral hygiene regimen  - Implement oral medicated treatments as ordered  - Initiate Nutrition services referral as needed  Outcome: Progressing     Problem: MUSCULOSKELETAL - ADULT  Goal: Maintain or return mobility to safest level of function  Description  INTERVENTIONS:  - Assess patient's ability to carry out ADLs; assess patient's baseline for ADL function and identify physical deficits which impact ability to perform ADLs (bathing, care of mouth/teeth, toileting, grooming, dressing, etc )  - Assess/evaluate cause of self-care deficits   - Assess range of motion  - Assess patient's mobility  - Assess patient's need for assistive devices and provide as appropriate  - Encourage maximum independence but intervene and supervise when necessary  - Involve family in performance of ADLs  - Assess for home care needs following discharge   - Consider OT consult to assist with ADL evaluation and planning for discharge  - Provide patient education as appropriate  Outcome: Progressing  Goal: Maintain proper alignment of affected body part  Description  INTERVENTIONS:  - Support, maintain and protect limb and body alignment  - Provide patient/ family with appropriate education  Outcome: Progressing     Problem: COPING  Goal: Pt/Family able to verbalize concerns and demonstrate effective coping strategies  Description  INTERVENTIONS:  - Assist patient/family to identify coping skills, available support systems and cultural and spiritual values  - Provide emotional support, including active listening and acknowledgement of concerns of patient and caregivers  - Reduce environmental stimuli, as able  - Provide patient education  - Assess for spiritual pain/suffering and initiate spiritual care, including notification of Pastoral Care or bianka based community as needed  - Assess effectiveness of coping strategies  Outcome: Progressing  Goal: Will report anxiety at manageable levels  Description  INTERVENTIONS:  - Administer medication as ordered  - Teach and encourage coping skills  - Provide emotional support  - Assess patient/family for anxiety and ability to cope  Outcome: Progressing     Problem: Prexisting or High Potential for Compromised Skin Integrity  Goal: Skin integrity is maintained or improved  Description  INTERVENTIONS:  - Identify patients at risk for skin breakdown  - Assess and monitor skin integrity  - Assess and monitor nutrition and hydration status  - Monitor labs   - Assess for incontinence   - Turn and reposition patient  - Assist with mobility/ambulation  - Relieve pressure over bony prominences  - Avoid friction and shearing  - Provide appropriate hygiene as needed including keeping skin clean and dry  - Evaluate need for skin moisturizer/barrier cream  - Collaborate with interdisciplinary team   - Patient/family teaching  - Consider wound care consult   Outcome: Progressing     Problem: Potential for Falls  Goal: Patient will remain free of falls  Description  INTERVENTIONS:  - Assess patient frequently for physical needs  -  Identify cognitive and physical deficits and behaviors that affect risk of falls    -  [place] fall precautions as indicated by assessment   - Educate patient/family on patient safety including physical limitations  - Instruct patient to call for assistance with activity based on assessment  - Modify environment to reduce risk of injury  - Consider OT/PT consult to assist with strengthening/mobility  Outcome: Progressing     Problem: PAIN - ADULT  Goal: Verbalizes/displays adequate comfort level or baseline comfort level  Description  Interventions:  - Encourage patient to monitor pain and request assistance  - Assess pain using appropriate pain scale  - Administer analgesics based on type and severity of pain and evaluate response  - Implement non-pharmacological measures as appropriate and evaluate response  - Consider cultural and social influences on pain and pain management  - Notify physician/advanced practitioner if interventions unsuccessful or patient reports new pain  Outcome: Progressing

## 2019-11-04 NOTE — PROGRESS NOTES
11/04/19 1230   Pain Assessment   Pain Assessment 0-10   Pain Score No Pain   Restrictions/Precautions   Precautions Fall Risk;Fluid restriction  (1200mL)   Sit to Lying   Comment Therapist assessed nursing to place pt in bed for bladder scan  Pt requiring modA with BLE and trunk mngmnt   Reason if not Attempted Activity not applicable   Sit to Lying CARE Score 9   Lying to Sitting on Side of Bed   Comment Therapist assessed nursing to place pt in bed for bladder scan  Pt requiring modA with BLE and trunk mngmnt   Reason if not Attempted Activity not applicable   Lying to Sitting on Side of Bed CARE Score 9   Sit to Stand   Type of Assistance Needed Supervision; Adaptive equipment   Amount of Physical Assistance Provided No physical assistance   Comment Requiring extra time to compelte with RW support   Sit to Stand CARE Score 4   Bed-Chair Transfer   Type of Assistance Needed Supervision; Adaptive equipment   Amount of Physical Assistance Provided No physical assistance   Comment no A with RW  Pt demosntrating good sacety awareness as evidnced by making sure w/c brakes are locked and proper positioning of hand/foot prior to transfers   Chair/Bed-to-Chair Transfer CARE Score 4   Transfer Bed/Chair/Wheelchair   Positioning Concerns Skin Integrity   Limitations Noted In Balance; Coordination; Endurance;UE Strength;LE Strength   Adaptive Equipment Roller Walker   Findings Moves slowly   Jesus Manuel Kelly   Type of Assistance Needed Supervision  (close supervision)   Amount of Physical Assistance Provided No physical assistance   Comment Pt able to perform sit<>stand with no A  Pt with good standing balance and able to perform hygiene after urination  No assistance required   Henrry Alfaro 83 Score 4   Toileting   Able to [address] down yes, up yes  Able to Manage Clothing Closures Yes   Manage Hygiene Bladder   Limitations Noted In Balance; Coordination;UE Strength; Other   Adaptive Equipment Grab Bar   Toilet Transfer   Type of Assistance Needed Supervision; Adaptive equipment   Amount of Physical Assistance Provided No physical assistance   Comment Able to use grab bar and lift self with stand pivot to toilet, no assistance required, moves slowly   Toilet Transfer CARE Score 4   Toilet Transfer   Surface Assessed Raised Toilet   Transfer Technique Stand Pivot   Limitations Noted In Balance; Endurance;UE Strength;LE Strength   Adaptive Equipment Grab Bar   Positioning Concerns Grab Bars   Kitchen Mobility   Kitchen-Mobility Level Walker   Kitchen Activity Retrieve items;Transport items   Kitchen Mobility Comments Pt required close supervision for item retrieval/trasport with use of folding walker tray  Pt able to perform functional ADL mobility with RW to refrigerator, good positioning of self in front with good standing balance to retrieve items and place on walker tray  In additin pt able to transport to microwave and open/close and transport back to regular table with regular chair  Pt reports daughter prepares meals where either she leaves them on the kitchen table or refriegerator where pt would have to retrieve with RW and walker tray  Pt reports at times she would make self a small salad and retrieve items from kitchen with walker tray and trasport them to table  Overall pt reporting good balance and safety awareness during kitchen mobilty  At this time pt would continue to recommend supervision for safety 2* to pt having multiple falls t/o the year  Pt verbalized understanding  Functional Standing Tolerance   Time 10mins   Activity Coloring in stance   Comments Pt participated in standinng tolerance/activity tolerance activity while participating in distractive task of coloring  Pt able to maintain good balance for 10mins with no c/o fatigue/SOB until end of task requiring to sit      Therapeutic Excerise-Strength   UE Strength Yes   Exercise Tools   Other Exercise Tool 1 Pt engaged in BUE strengthening with 2lb dowel with shoudler exercises in all available shoulder planes completed 3x10 requiring rest break in between each set  Pt educated on continued used of pursed lip breathing exercises for fatigue/SOB mngt, pt with good carryover t/o ther ex  Therapist instructed pt t/o exercises for optimal performance in order to increase BUE strength in order to A with functional transfers   Cognition   Overall Cognitive Status [facility]   Arousal/Participation Alert; Cooperative   Attention Within functional limits   Orientation Level Oriented X4   Memory Decreased short term memory   Following Commands Follows multistep commands with increased time or repetition   Activity Tolerance   Activity Tolerance Patient tolerated treatment well   Medical Staff Made Aware Spoke with ZENAIDA Guido pt placed in room with call bell within reach after session   Assessment   Treatment Assessment Pt participated in 90mins of skilled OT services iwth focus on functional transfers and functional standing toelrance and balance as well as participation in IADL tasks with RW and folding walker tray  Pt toelrated tx session well, however, continues to demonstrate decreased activity tolerance as evidenced by requiring frequent rest breaks during BUE strengthening  However, pt demosntrates overall improvement in functional transfers with RW to close supervision  Overall, therapist continues to recommend supervision for pt at d/c 2* to pts hx of frequent falls  Pt reports daughter is working on having friends come stay with her during the day  Pt due for tentative d/c date on 11/6/19 pending medical status  Pt can cotinue to benefit from skilled OT services to increase activity tolerance, BUE strength, dynamic standing balance, to increase I and decrease caregiver burden  Prognosis Good   Problem List Decreased strength;Decreased endurance; Impaired balance;Decreased mobility   Barriers to Discharge Decreased caregiver support   Plan   Treatment/Interventions ADL retraining;Functional transfer training; Therapeutic exercise; Endurance training;Patient/family training; Compensatory technique education   Progress Progressing toward goals   Recommendation   OT Discharge Recommendation 24 hour supervision/assist   OT Therapy Minutes   OT Time In 1230   OT Time Out 1400   OT Total Time (minutes) 90   OT Mode of treatment - Individual (minutes) 90   OT Mode of treatment - Concurrent (minutes) 0   OT Mode of treatment - Group (minutes) 0   OT Mode of treatment - Co-treat (minutes) 0   OT Mode of Treatment - Total time(minutes) 90 minutes   OT Cumulative Minutes 600   Therapy Time missed   Time missed?  No

## 2019-11-04 NOTE — ASSESSMENT & PLAN NOTE
Wt Readings from Last 3 Encounters:   11/04/19 84 3 kg (185 lb 12 8 oz)   10/28/19 76 kg (167 lb 8 8 oz)   10/27/19 81 9 kg (180 lb 8 9 oz)     Patient with acute on chronic diastolic CHF  She did require BiPAP, IV diuresis during her ICU stay        Recommend daily weights with the same scale  Weights have been on different scales and therefore unreliable - discussed with nursing to have same scale daily      Last echo was 10/21/2019  Showing moderate left ventricle systolic dysfunction with an EF 41%  Severe hypokinesis to akinesis in the distal half of the anterior, lateral and inferior walls with akinesis of the distal 3rd of the septum  The apex is akinetic to paradoxical  The  base of the heart contracts vigorously  Although these findings could be secondary to coronary artery disease, they also are consistent with Takotsubo's syndrome  Grade 2 left ventricle diastolic dysfunction  Moderate to severe MR  Calcified tricuspid aortic valve  Moderate TR  Mild FL     Pt does not appear fluid overload  Weights relatively stable and last 3 weights have been on same scale  Continue fluid restrictions  Trace bilateral lower extremity edema  Discussed with patient continue compression stockings and elevation of lower extremities  She is having some SOB, HERNANDEZ this am - improved from yesterday  Patient was seen by Nephrology today  Patient was started on Lasix 80 mg twice daily,  Was also restarted on Zaroxolyn 5mg  She does c/o wheezing and she is noted to have diffuse wheezing, no crackles  She received a neb with improvment  Will order neb  Q 6 hours    Should have home neb arranged      IS given by therapy today and encouraged use     BNP 13774  Chest xray done today

## 2019-11-04 NOTE — ASSESSMENT & PLAN NOTE
Sodium is stable at this time  Mild decrease and asymptomatic  Continue with fluid restrictions        NA-->134

## 2019-11-04 NOTE — ASSESSMENT & PLAN NOTE
No CPAP at home  Last sleep study was approx 5 years ago  Did not wear home CPAP  Was wearing during hospitalization  Patient had an overnight pulse oximetry on 10/28 on 2 L nasal cannula  Patient's pulse oximetry was 90-99% 86 6% of the time and 100% 13 4% of the time  Continue 2L NC @ night  Discussed with Dr Disha Lira will be planning on noctural POX Sunday night on RA for approval for home O2   Patient is also being followed by pulmonology now and will need to follow up outpatient for sleep study for home CPAP  Reviewed pulm consult       pt had ambulatory Pox on RA  sat 91%

## 2019-11-04 NOTE — NURSING NOTE
Nurse practioner and dr ahmadi aware of increase bnp and weight and her wheezing ordered breathing trearments also neprologist added derrick

## 2019-11-05 LAB
ANION GAP SERPL CALCULATED.3IONS-SCNC: 11 MMOL/L (ref 5–14)
BUN SERPL-MCNC: 73 MG/DL (ref 5–25)
CALCIUM SERPL-MCNC: 9.3 MG/DL (ref 8.4–10.2)
CHLORIDE SERPL-SCNC: 89 MMOL/L (ref 97–108)
CO2 SERPL-SCNC: 33 MMOL/L (ref 22–30)
CREAT SERPL-MCNC: 1.61 MG/DL (ref 0.6–1.2)
GFR SERPL CREATININE-BSD FRML MDRD: 29 ML/MIN/1.73SQ M
GLUCOSE SERPL-MCNC: 120 MG/DL (ref 70–99)
POTASSIUM SERPL-SCNC: 3.9 MMOL/L (ref 3.6–5)
SODIUM SERPL-SCNC: 133 MMOL/L (ref 137–147)

## 2019-11-05 PROCEDURE — 97110 THERAPEUTIC EXERCISES: CPT

## 2019-11-05 PROCEDURE — 99233 SBSQ HOSP IP/OBS HIGH 50: CPT | Performed by: INTERNAL MEDICINE

## 2019-11-05 PROCEDURE — 97535 SELF CARE MNGMENT TRAINING: CPT

## 2019-11-05 PROCEDURE — 94640 AIRWAY INHALATION TREATMENT: CPT

## 2019-11-05 PROCEDURE — 99232 SBSQ HOSP IP/OBS MODERATE 35: CPT | Performed by: PHYSICAL MEDICINE & REHABILITATION

## 2019-11-05 PROCEDURE — 99232 SBSQ HOSP IP/OBS MODERATE 35: CPT | Performed by: INTERNAL MEDICINE

## 2019-11-05 PROCEDURE — 97116 GAIT TRAINING THERAPY: CPT

## 2019-11-05 PROCEDURE — 97530 THERAPEUTIC ACTIVITIES: CPT

## 2019-11-05 PROCEDURE — 94760 N-INVAS EAR/PLS OXIMETRY 1: CPT

## 2019-11-05 PROCEDURE — 80048 BASIC METABOLIC PNL TOTAL CA: CPT | Performed by: INTERNAL MEDICINE

## 2019-11-05 RX ORDER — METOLAZONE 5 MG/1
5 TABLET ORAL
Qty: 15 TABLET | Refills: 3 | Status: SHIPPED | OUTPATIENT
Start: 2019-11-06 | End: 2019-11-06

## 2019-11-05 RX ORDER — GABAPENTIN 300 MG/1
300 CAPSULE ORAL 3 TIMES DAILY
Qty: 60 CAPSULE | Refills: 0 | Status: ON HOLD
Start: 2019-11-05 | End: 2022-06-03

## 2019-11-05 RX ORDER — ISOSORBIDE MONONITRATE 30 MG/1
30 TABLET, EXTENDED RELEASE ORAL DAILY
Qty: 30 TABLET | Refills: 3 | Status: SHIPPED | OUTPATIENT
Start: 2019-11-06 | End: 2020-01-21

## 2019-11-05 RX ORDER — METOLAZONE 5 MG/1
5 TABLET ORAL
Status: DISCONTINUED | OUTPATIENT
Start: 2019-11-06 | End: 2019-11-06

## 2019-11-05 RX ORDER — METOLAZONE 5 MG/1
5 TABLET ORAL DAILY
Status: DISCONTINUED | OUTPATIENT
Start: 2019-11-06 | End: 2019-11-05

## 2019-11-05 RX ORDER — TORSEMIDE 20 MG/1
40 TABLET ORAL 2 TIMES DAILY
Status: DISCONTINUED | OUTPATIENT
Start: 2019-11-05 | End: 2019-11-06 | Stop reason: HOSPADM

## 2019-11-05 RX ORDER — CLOPIDOGREL BISULFATE 75 MG/1
75 TABLET ORAL DAILY
Qty: 30 TABLET | Refills: 3 | Status: SHIPPED | OUTPATIENT
Start: 2019-11-06 | End: 2020-05-20 | Stop reason: SDUPTHER

## 2019-11-05 RX ORDER — TORSEMIDE 20 MG/1
40 TABLET ORAL 2 TIMES DAILY
Qty: 120 TABLET | Refills: 3 | Status: SHIPPED | OUTPATIENT
Start: 2019-11-05 | End: 2020-03-10 | Stop reason: SDUPTHER

## 2019-11-05 RX ADMIN — METOPROLOL TARTRATE 25 MG: 25 TABLET ORAL at 22:51

## 2019-11-05 RX ADMIN — LEVOTHYROXINE SODIUM 112 MCG: 112 TABLET ORAL at 06:29

## 2019-11-05 RX ADMIN — METOPROLOL TARTRATE 25 MG: 25 TABLET ORAL at 09:06

## 2019-11-05 RX ADMIN — ALBUTEROL SULFATE 2.5 MG: 2.5 SOLUTION RESPIRATORY (INHALATION) at 19:40

## 2019-11-05 RX ADMIN — ACETAMINOPHEN 650 MG: 325 TABLET ORAL at 15:07

## 2019-11-05 RX ADMIN — ACETAMINOPHEN 650 MG: 325 TABLET ORAL at 06:49

## 2019-11-05 RX ADMIN — GABAPENTIN 300 MG: 300 CAPSULE ORAL at 15:01

## 2019-11-05 RX ADMIN — METHOCARBAMOL 500 MG: 500 TABLET ORAL at 06:49

## 2019-11-05 RX ADMIN — HEPARIN SODIUM 5000 UNITS: 5000 INJECTION INTRAVENOUS; SUBCUTANEOUS at 22:51

## 2019-11-05 RX ADMIN — ISOSORBIDE MONONITRATE 30 MG: 30 TABLET, EXTENDED RELEASE ORAL at 09:06

## 2019-11-05 RX ADMIN — GABAPENTIN 300 MG: 300 CAPSULE ORAL at 09:07

## 2019-11-05 RX ADMIN — HEPARIN SODIUM 5000 UNITS: 5000 INJECTION INTRAVENOUS; SUBCUTANEOUS at 14:58

## 2019-11-05 RX ADMIN — TORSEMIDE 40 MG: 20 TABLET ORAL at 09:07

## 2019-11-05 RX ADMIN — ALBUTEROL SULFATE 2.5 MG: 2.5 SOLUTION RESPIRATORY (INHALATION) at 08:28

## 2019-11-05 RX ADMIN — SERTRALINE 25 MG: 25 TABLET, FILM COATED ORAL at 22:52

## 2019-11-05 RX ADMIN — PANTOPRAZOLE 20 MG: 20 TABLET, DELAYED RELEASE ORAL at 06:29

## 2019-11-05 RX ADMIN — LIDOCAINE 3 PATCH: 50 PATCH CUTANEOUS at 13:53

## 2019-11-05 RX ADMIN — ALBUTEROL SULFATE 2.5 MG: 2.5 SOLUTION RESPIRATORY (INHALATION) at 13:09

## 2019-11-05 RX ADMIN — CLOPIDOGREL BISULFATE 75 MG: 75 TABLET, FILM COATED ORAL at 09:07

## 2019-11-05 RX ADMIN — HEPARIN SODIUM 5000 UNITS: 5000 INJECTION INTRAVENOUS; SUBCUTANEOUS at 06:33

## 2019-11-05 RX ADMIN — FUROSEMIDE 80 MG: 10 INJECTION, SOLUTION INTRAMUSCULAR; INTRAVENOUS at 04:03

## 2019-11-05 RX ADMIN — METHOCARBAMOL 500 MG: 500 TABLET ORAL at 15:07

## 2019-11-05 RX ADMIN — GABAPENTIN 300 MG: 300 CAPSULE ORAL at 22:50

## 2019-11-05 NOTE — PROGRESS NOTES
11/05/19 0700   Pain Assessment   Pain Assessment No/denies pain   Pain Score No Pain   Restrictions/Precautions   Precautions Fall Risk;Fluid restriction  (1200mL)   Weight Bearing Restrictions No   ROM Restrictions No   Oral Hygiene   Type of Assistance Needed Supervision   Amount of Physical Assistance Provided No physical assistance   Comment Supervision with pt seated in w/c, able to manage dentures   Oral Hygiene CARE Score 4   Grooming   Able To Initiate Tasks;Comb/Brush Hair;Wash/Dry Face;Brush/Clean Teeth;Wash/Dry Hands   Limitation Noted In Timeliness   Findings seated in w/c   Shower/Bathe Self   Type of Assistance Needed Physical assistance   Amount of Physical Assistance Provided Less than 25%   Comment Continues to sponge bath 2* to PICC and no avaialbility of shower glove  Pt able to wash UB and primo thighs, however requires A to wash below knees and buttock  area  Supervision to [facility] balance while in stance to wash loreto area   Shower/Bathe Self CARE Score 3   Bathing   Assessed Bath Style Sponge Bath   Anticipated D/C Bath Style Shower   Able to [place] No   Able to [employer] Temperature No   Able to Wash/Rinse/Dry (body part) Left Arm;Right Arm;L Upper Leg;R Upper Leg;Chest;Abdomen;Perineal Area; Buttocks   Limitations Noted in Balance; Endurance;Timeliness;Strength   Positioning Seated;Standing   Tub/Shower Transfer   Reason Not Assessed Medical  (no availability of shower glove to cover PICC)   Upper Body Dressing   Type of Assistance Needed Supervision   Amount of Physical Assistance Provided No physical assistance   Comment Able to perform sitting   Upper Body Dressing CARE Score 4   Lower Body Dressing   Type of Assistance Needed Supervision; Adaptive equipment   Amount of Physical Assistance Provided No physical assistance   Comment use of dressing stick to don pull ups and pants, requiring extra time to complete   Lower Body Dressing CARE Score 4   Putting On/Taking Off Footwear Type of Assistance Needed Physical assistance   Amount of Physical Assistance Provided Total assistance   Comment TA to don compression socks and sneakers, pt reports if someone is avaialble at home they can assists   Putting On/Taking Off Footwear CARE Score 1   Dressing/Undressing Clothing   Remove UB Clothes [place]over [address] Undergarment;Socks   [address] [facility] Undergarment;Pants; Shoes;Socks   Limitations Noted In Balance; Endurance;ROM; Timeliness   Adaptive Equipment Dressing Stick   Positioning Supported Sit;Standing   Lying to Sitting on Side of Bed   Comment requires modA for bed mobiltiy, however, pt sleeps in recliner at home   Reason if not Attempted Activity not applicable   Lying to Sitting on Side of Bed CARE Score 9   Sit to Stand   Type of Assistance Needed Supervision; Adaptive equipment   Amount of Physical Assistance Provided No physical assistance   Comment Pt with good safety awareness and propoer hand/foot palcement, slow movement but able to perform without A   Sit to Stand CARE Score 4   Bed-Chair Transfer   Type of Assistance Needed Supervision; Adaptive equipment   Amount of Physical Assistance Provided No physical assistance   Comment Moves slowly with RW   Chair/Bed-to-Chair Transfer CARE Score 4   Transfer Bed/Chair/Wheelchair   Positioning Concerns Skin Integrity   Limitations Noted In Balance; Endurance;UE Strength;LE Strength   Adaptive Equipment Roller Walker   Toileting Hygiene   Type of Assistance Needed Supervision   Amount of Physical Assistance Provided No physical assistance   Comment Pt able to stand with use of grab bar on R side with good standinb balance and able to eprform loreto hygiene   Toileting Hygiene CARE Score 4   Toileting   Able to [address] down yes, up yes     Able to Manage Clothing Closures Yes   Manage Hygiene Bladder   Limitations Noted In [address] Toilet Transfer   Type of Assistance Needed Supervision; Adaptive equipment   Amount of Physical Assistance Provided No physical assistance   Comment Supervision to perform funcitonal ADL mobility with RW from bed>toilet   Toilet Transfer CARE Score 4   Toilet Transfer   Surface Assessed Raised Toilet   Transfer Technique Stand Pivot   Limitations Noted In Balance; Endurance;UE Strength;LE Strength   Adaptive Equipment Grab Bar   Therapeutic Excerise-Strength   UE Strength Yes   Exercise Tools   Exercise Tools Yes   Theraband Pt seated in w/c and engaged in light orange light resistance tband exercises compelted 3x10 in all available shoudler planes  Pt requiring vc t/o ther ex 2* to increased wheezing  Vc to perform pursed lip breathing exercises for breathing mngmnt  Next [facility] provide pt with HEP for tband exercises   Cognition   Overall Cognitive Status [facility]   Arousal/Participation Alert; Cooperative   Attention Within functional limits   Orientation Level Oriented X4   Memory Decreased short term memory   Following Commands Follows multistep commands with increased time or repetition   Activity Tolerance   Activity Tolerance Patient tolerated treatment well  (requiring frequent rest breaks 2* to SOB/fatigue)   Assessment   Treatment Assessment Pt engaged in 90mins of sklled OT services with foucs on self-care functional transfers and BUE strengthening  Pt is demonstrating overall functional improvement in ADL participation funcitoning at supervision level  Pt only requires A to don socks and sneakers  Pt eprforms functional ADL transfers with RW at supervision with increased time 2* to pt moving slowly  Therapist econtinued to educate pt on use of pursed lip breathing exercises for fatigue/SOB mngmnt, pt with mod carryover t/o tx session  During ADL session therapist washed LUE with chlorehexidine wipes and applied nystatin onto R stomach area, communicated with nursing   Pt with tentative d/c date for 11/6/19 based upon medical status  Pt has good support system at home and as per pt someone will be with her upon d/c  Cont skilled OT services towards LTG to increase independence nad functional performance to decrease caregiver burden, prevent falls and reduce risk of readmission  Prognosis Good   Problem List Decreased strength;Decreased range of motion;Decreased endurance; Impaired balance   Plan   Treatment/Interventions ADL retraining;Functional transfer training; Therapeutic exercise;Patient/family training; Compensatory technique education   Progress Progressing toward goals   Recommendation   OT Discharge Recommendation 24 hour supervision/assist  (and home with family support)   Equipment Recommended   (Pt has all available equipment at home, no DME recommendatio)   OT Therapy Minutes   OT Time In 0700   OT Time Out 0830   OT Total Time (minutes) 90   OT Mode of treatment - Individual (minutes) 90   OT Mode of treatment - Concurrent (minutes) 0   OT Mode of treatment - Group (minutes) 0   OT Mode of treatment - Co-treat (minutes) 0   OT Mode of Treatment - Total time(minutes) 90 minutes   OT Cumulative Minutes 690   Therapy Time missed   Time missed?  No

## 2019-11-05 NOTE — ASSESSMENT & PLAN NOTE
Secondary to a fall 10/8  Patient was admitted to [facility] 10/8 through 10/10  She was evaluated by Neurosurgery who recommended conservative management of SDH

## 2019-11-05 NOTE — PROGRESS NOTES
11/05/19 0930   Pain Assessment   Pain Assessment No/denies pain   Restrictions/Precautions   Precautions Fall Risk;Fluid restriction   Weight Bearing Restrictions No   Subjective   Subjective Pt reports breathing is feeling slightly better   Roll Left and Right   Reason if not Attempted Activity not applicable   Roll Left and Right CARE Score 9   Sit to Lying   Reason if not Attempted Activity not applicable   Sit to Lying CARE Score 9   Lying to Sitting on Side of Bed   Reason if not Attempted Activity not applicable   Lying to Sitting on Side of Bed CARE Score 9   Sit to Stand   Type of Assistance Needed Supervision   Comment to RW   Sit to Stand CARE Score 4   Bed-Chair Transfer   Type of Assistance Needed Supervision   Comment with RW   Chair/Bed-to-Chair Transfer CARE Score 4   Transfer Bed/Chair/Wheelchair   Limitations Noted In Balance;LE Strength; Endurance   Adaptive Equipment Roller Walker   Stand Pivot Supervision   Sit to Stand Supervision   Stand to [employer]   Findings increased time and effort , needs raised height   Walk 10 Feet   Type of Assistance Needed Supervision   Comment with RW   Walk 10 Feet CARE Score 4   Walk 50 Feet with Two Turns   Type of Assistance Needed Supervision   Comment with RW   Walk 50 Feet with Two Turns CARE Score 4   Walk 150 Feet   Reason if not Attempted Safety concerns   Walk 150 Feet CARE Score 88   Walking 10 Feet on Uneven Surfaces   Type of Assistance Needed Adaptive equipment;Supervision   Comment up/down ramp with RW   Walking 10 Feet on Uneven Surfaces CARE Score 4   Ambulation   Does the patient walk? 2  Yes   Primary Mode of Locomotion Prior to Admission Walk   Distance Walked (feet) 80 ft  (50x1,  30x2)   Gait Pattern Slow Jenny;Decreased foot clearance; Step through   Limitations Noted In Speed;Strength; Endurance; Heel Strike   Findings slow but steady, intermittent cues to stay closer inside walker    Wheelchair mobility   Does the patient use a wheelchair? 0  No   Curb or Single Stair   Style negotiated Curb   Type of Assistance Needed Adaptive equipment;Supervision   Comment 4''  curb with RW, VCs for safety   1 Step (Curb) CARE Score 4   4 Steps   Reason if not Attempted Activity not applicable   4 Steps CARE Score 9   12 Steps   Reason if not Attempted Activity not applicable   12 Steps CARE Score 9   Picking Up Object   Type of Assistance Needed Supervision   Comment standing with reacher and walker   Picking Up Object CARE Score 4   Toilet Transfer   Type of Assistance Needed Supervision   Comment RW   Toilet Transfer CARE Score 4   Therapeutic Interventions   Strengthening standing at counter, AROM marches, hip abd 6x3   Balance std unsupported whipping hands with santizer,  managing pants in bathroom no LOB    Assessment   Treatment Assessment Session participated in functional acts, MD noted pt may be for D/C home tomorrow  She will have full supervision with using RW  Pt able to perform ramp and curb to enter  Pt sleeps in a recliner at home  Encouraged pt to be active at home and not sit too long at periods t/o the day  Cont skilled PT toward LTGs   PT Barriers   Physical Impairment Decreased strength;Decreased endurance; Impaired balance;Decreased mobility   Functional Limitation Car transfers;Stair negotiation;Standing;Walking   Plan   Treatment/Interventions Functional transfer training;LE strengthening/ROM; Elevations; Therapeutic exercise; Endurance training;Gait training   Recommendation   Recommendation Home PT; Home with family support   Equipment Recommended Walker   PT Therapy Minutes   PT Time In 0930   PT Time Out 1100   PT Total Time (minutes) 90   PT Mode of treatment - Individual (minutes) 90   PT Mode of treatment - Concurrent (minutes) 0   PT Mode of treatment - Group (minutes) 0   PT Mode of treatment - Co-treat (minutes) 0   PT Mode of Treatment - Total time(minutes) 90 minutes   PT Cumulative Minutes 660   Therapy Time missed Time missed?  No

## 2019-11-05 NOTE — ASSESSMENT & PLAN NOTE
No CPAP at home  Last sleep study was approx 5 years ago  Did not wear home CPAP  Was wearing during hospitalization  Patient had an overnight pulse oximetry on 10/28 on 2 L nasal cannula  Patient's pulse oximetry was 90-99% 86 6% of the time and 100% 13 4% of the time  Continue 2L NC @ night  Patient had overnight pulse ox performed,  Less then 89% on room and  Patient does not qualify for home ox, will repeat tonight  Patient is also being followed by pulmonology now and will need to follow up outpatient for sleep study for home CPAP  Reviewed pulm consult       pt had ambulatory Pox on RA  sat 91%

## 2019-11-05 NOTE — ASSESSMENT & PLAN NOTE
Reviewed Nephrology note  Patient with a baseline creatinine of 1 5-1 6  Patient restarted on Torsemide  Zaroxolyn, Lasix discontinued  She does need to have been repeat renal artery ultrasound secondary to history of renal artery stenosis  It was attempted to be completed during his recent hospitalization but was unable to be secondary to discomfort  Creatinine is continuing to improve  1 95--> 2 08 --> 1 99 --> 1 84 --> 1 76 --> 1 58--> 1 69-->1 66    Patients potassium 3 9 will continue to monitor, may need potassium supplementation     Patient will follow up with Nephrology outpatient the

## 2019-11-05 NOTE — PROGRESS NOTES
11/05/19 1500   Pain Assessment   Pain Assessment 0-10   Pain Score 2   Pain Type Acute pain   Pain Location Head   Pain Orientation Anterior   Hospital Pain Intervention(s) Medication (See MAR); Ambulation/increased activity;Repositioned   Response to Interventions pain 0/10 at end of sesion    Restrictions/Precautions   Precautions Fall Risk;Fluid restriction   Weight Bearing Restrictions No   ROM Restrictions No   Cognition   Overall Cognitive Status WFL   Arousal/Participation Alert; Cooperative   Subjective   Subjective Pt c/o HA as above, agreeable to session    Sit to Stand   Type of Assistance Needed Supervision; Adaptive equipment   Amount of Physical Assistance Provided No physical assistance   Comment RW    Sit to Stand CARE Score 4   Bed-Chair Transfer   Type of Assistance Needed Supervision   Amount of Physical Assistance Provided No physical assistance   Comment RW    Chair/Bed-to-Chair Transfer CARE Score 4   Transfer Bed/Chair/Wheelchair   Limitations Noted In Balance; Endurance;LE Strength   Adaptive Equipment Roller Walker   Stand Pivot Supervision   Sit to Stand Supervision   Stand to [employer] Transfer Minimal Assist   Findings S with RW, inc time for all mobiltiy  Car Transfer   Type of Assistance Needed Physical assistance   Amount of Physical Assistance Provided 25%-49%   Comment A BLE practice transfer on EOM  Car Transfer CARE Score 3   Walk 10 Feet   Type of Assistance Needed Supervision; Adaptive equipment   Amount of Physical Assistance Provided No physical assistance   Comment CS w RW    Walk 10 Feet CARE Score 4   Walk 50 Feet with Two Turns   Type of Assistance Needed Supervision; Adaptive equipment   Amount of Physical Assistance Provided No physical assistance   Comment CS w RW    Walk 50 Feet with Two Turns CARE Score 4   Walk 150 Feet   Reason if not Attempted Safety concerns   Walk 150 Feet CARE Score 88   Ambulation   Does the patient walk? 2   Yes   Primary Mode of Locomotion Prior to Admission Walk   Distance Walked (feet) 80 ft  (x2 )   Assist Device Roller Walker   Gait Pattern Slow Jenny;Decreased foot clearance; Step through   Limitations Noted In Endurance; Heel Strike;Speed;Strength   Walk Assist Level Close Supervision   Wheel 50 Feet with Two Turns   Reason if not Attempted Activity not applicable   Wheel 50 Feet with Two Turns CARE Score 9   Wheel 150 Feet   Reason if not Attempted Activity not applicable   Wheel 236 Feet CARE Score 9   Wheelchair mobility   Does the patient use a wheelchair? 0  No   Curb or Single Stair   Style negotiated Curb   Type of Assistance Needed Adaptive equipment;Supervision   Amount of Physical Assistance Provided No physical assistance   Comment 4" curb step x2    1 Step (Curb) CARE Score 4   4 Steps   Reason if not Attempted Activity not applicable   4 Steps CARE Score 9   12 Steps   Reason if not Attempted Activity not applicable   12 Steps CARE Score 9   Stairs   Type Curb   # of Steps 2   Weight Bearing Precautions Fall Risk   Assist Devices Roller Walker   Findings 4" curb step to mimic step in living room at home with RW CS level with inc time  performed x2    Picking Up Object   Type of Assistance Needed Supervision; Adaptive equipment   Amount of Physical Assistance Provided No physical assistance   Comment staning with RW and use of reacher    Picking Up Object CARE Score 4   Assessment   Treatment Assessment Pt participated in [facility] session with focus on inc functional mobility, curb step  Pt sounding much better this session less weezing  Pt set to d/c home tomorrow if remains stable  Dtr to hire aide and voulnteers from [facility] to provide A as needed  Pt to receive HHPT upon d/c  overall S with RW and inc time needed for all functional mobility  Family/Caregiver Present No   PT Barriers   Physical Impairment Decreased strength;Decreased endurance; Impaired balance;Decreased mobility   Functional Limitation Car transfers;Stair negotiation; Walking;Ramp negotiation   Plan   Treatment/Interventions Functional transfer training;LE strengthening/ROM; Therapeutic exercise; Endurance training;Patient/family training;Equipment eval/education; Bed mobility;Gait training   Progress Progressing toward goals   Recommendation   Recommendation Home PT; Home with family support   Equipment Recommended Walker   PT Therapy Minutes   PT Time In 1500   PT Time Out 1530   PT Total Time (minutes) 30   PT Mode of treatment - Individual (minutes) 30   PT Mode of treatment - Concurrent (minutes) 0   PT Mode of treatment - Group (minutes) 0   PT Mode of treatment - Co-treat (minutes) 0   PT Mode of Treatment - Total time(minutes) 30 minutes   PT Cumulative Minutes 690   Therapy Time missed   Time missed?  No

## 2019-11-05 NOTE — ASSESSMENT & PLAN NOTE
Bioprosthetic valve  Last echo was 10/21/2019  Showing moderate left ventricle systolic dysfunction with an EF 41%  Severe hypokinesis to akinesis in the distal half of the anterior, lateral and inferior walls with akinesis of the distal 3rd of the septum  The apex is akinetic to paradoxical  The base of the heart contracts vigorously  Although these findings could be secondary to coronary artery disease, they also are consistent with Takotsubo's syndrome  Grade 2 left ventricle diastolic dysfunction  Moderate to severe MR  Calcified tricuspid aortic valve  Moderate TR    Mild AR

## 2019-11-05 NOTE — ASSESSMENT & PLAN NOTE
BP controlled  Continue metoprolol 12 5 mg b i d  Demadex, and Zaroxolyn 5 mg tablet daily  Imdur 30mg added 11/1  Caution on adjustments with change in diuretic and imdur with impending d/c home Wed

## 2019-11-05 NOTE — PROGRESS NOTES
Physical Medicine and Rehabilitation Progress Note (LATE ENTRY)  Yaima Ornelas 80 y o  female MRN: [id number]  Unit/Bed#: [facility] 293-32 Encounter: [id number]    HPI: Yaima Ornelas is a 80 y o  female who presented to the [address] [place] [address] with generalized weakness  Patient formally been admitted from October 8th and discharged home on October 10th after fall at home  At the time workup was significant for subdural hematoma  Conservative management was recommended that time  Patient then re-presented on 10/12/19 with weakness and gait instability  During the 2nd admission, patient needed up with acute respiratory failure, requiring admission to ICU  Pulmonary function eventually did improve to the point where patient was able to be discharged to the [facility] on 10/19/19  Unfortunately patient's respiratory function decline, patient found to be in significant respiratory distress on October 21st, necessitated discharge back to acute hospital   Patient was found to have [place] [id number]  In addition imaging was positive for evidence of overload  Patient was diuresed with torsemide  Eventually pulmonary function improved to the point where patient could return to the [facility]  She was admitted to [address] [place] [address] on 10/28/19  Chief Complaint: f/u TBI and f/u ambulatory dysfunction    Interval: No acute events overnight  Had SOB this AM      ROS: A 10 point ROS was performed; negative except as noted above  Assessment/Plan:    * SDH (subdural hematoma) ([facility])  Assessment & Plan  · Conservative management was recommended  · Patient with a right subdural hematoma  · Discussed with neurosurgery, cleared for DVT prophylaxis, and plavix  Per service, bleed looks to have resolved  No need for repeat CTOH unless neurologic decline noted       CKD (chronic kidney disease) stage 3, GFR 30-59 ml/min [facility])  Assessment & Plan  Results from last 7 days   Lab Units 11/01/19  0729 10/31/19  3571 10/30/19  0538   CREATININE mg/dL 1 76* 1 84* 1 99*     · Continue monitoring kidney function via intermittent BMP  · Avoid nephrotoxic meds/NSAIDs  · Nephrology team following, appreciate vinny    Renal artery stenosis [facility])  Assessment & Plan  · Nephrology service is following  · Patient may require repeat ultrasound with pain medications    AZUL (obstructive sleep apnea)  Assessment & Plan  · Continue BiPAP at night  · Discussed with Pulmonology service, Dr Amina Aragon; patient will likely not be able to be discharged home on BiPAP unless hypercapneic  May require ABG  Otherwise, will need to repeat overnight pulse ox study on room air to qualify patient for home O2 via NC  Dr Amina Aragon will evaluate patient today  Chronic anemia  Assessment & Plan  Results from last 7 days   Lab Units 10/31/19  1300 10/30/19  0538   HEMOGLOBIN g/dL 9 3* 7 6*     · Monitor CBC intermittently   · Transfuse for Hgb <7  · Underwent another transfusion on 10/30, with appropriate response noted in H/h    Hyponatremia  Assessment & Plan  Results from last 7 days   Lab Units 11/01/19  0729 10/31/19  0441 10/30/19  0538   SODIUM mmol/L 135* 134* 133*     · Monitor BMP intermittently    Depression  Assessment & Plan  · Continue sertraline    Acute on chronic diastolic heart failure (HCC)  Assessment & Plan  Wt Readings from Last 3 Encounters:   11/01/19 83 1 kg (183 lb 3 2 oz)   10/28/19 76 kg (167 lb 8 8 oz)   10/27/19 81 9 kg (180 lb 8 9 oz)     · Monitor daily weights  · Torsemide increased to 30mg daily      Essential hypertension  Assessment & Plan  Temp:  [98 7 °F (37 1 °C)-98 8 °F (37 1 °C)] 98 7 °F (37 1 °C)  HR:  [66-73] 73  Resp:  [20] 20  BP: (125-163)/(58-69) 138/61    · Continue Metoprolol   · IM service managing anti-hypertensives, adjusting as needed    H/O: CVA (cerebrovascular accident)  Assessment & Plan  · Plavix was on hold due to bleed  · Discussed with neurosurgery  Patient now cleared to resume  Hypothyroidism  Assessment & Plan  · Continue Synthroid    # Skin  · Encourage regular turning as patient at risk for skin breakdown  · Staff to continue patient education on Q2h turning  · Rehabilitation team to perform skin checks regularly     # Bowel  · Patient reports no constipation  · To ensure regular BMs, bowel regimen consisting of:  colace , dulcolax suppository  and miralax     # Bladder  · Patient voiding spontaneously    # Pain  · Continue tylenol, for max of 3gm daily  # Rehab Psych   · There are no psychological or psychiatric problems identified    # Other  - Diet/Nutrition:        Diet Orders   (From admission, onward)             Start     Ordered    10/28/19 1323  Diet Cardiovascular; Cardiac; Fluid Restriction 1200 ML, Consistent Carbohydrate Diet Level 2 (5 carb servings/75 grams CHO/meal)  Diet effective now     Question Answer Comment   Diet Type Cardiovascular    Cardiac Cardiac    Other Restriction(s): Fluid Restriction 1200 ML    Other Restriction(s): Consistent Carbohydrate Diet Level 2 (5 carb servings/75 grams CHO/meal)    RD to adjust diet per protocol?  Yes        10/28/19 1323              - DVT prophy: Sequential compression device (Venodyne)  and Heparin  - GI ppx: Pantaprazole  - Nausea: None  - Supplements: None  - Sleep: None    Disposition: TBD    CODE: Level 1: Full Code Scheduled Meds:    Current Facility-Administered Medications:  acetaminophen 650 mg Oral Q6H PRN Anton Shrestha MD   albuterol 2 puff Inhalation Q4H PRN Anton Shrestha MD   albuterol 2 5 mg Nebulization TID Narinder Almanza MD   clopidogrel 75 mg Oral Daily Anton Shrestha MD   gabapentin 300 mg Oral TID Anton Shrestha MD   heparin (porcine) 5,000 Units Subcutaneous Q8H [address] Anton Shrestha MD   isosorbide mononitrate 30 mg Oral Daily NATHEN Escamilla   levothyroxine 112 mcg Oral Daily Anton Shrestha MD   lidocaine 3 patch Topical Daily PRN Narinder Almanza MD   menthol-methyl salicylate Apply externally 4x Daily PRN Nazario Stephen MD   methocarbamol 500 mg Oral Q6H PRN Nazario Stephen MD   [START ON 11/6/2019] metolazone 5 mg Oral Q48H Luz Mckee MD   metoprolol tartrate 25 mg Oral Q12H [address] David Kidd MD   nystatin  Topical BID Nazario Stephen MD   pantoprazole 20 mg Oral Daily Before Breakfast Gisselle Keller MD   polyethylene glycol 17 g Oral Daily PRN Nazario Stephen MD   sertraline 25 mg Oral HS Anton Shrestha MD   torsemide 40 mg Oral BID Luz Mckee MD   trolamine salicylate 1 application Topical 4x Daily NATHEN Escamilla        Objective:    Functional Update:  Physical Therapy Occupational Therapy   Weight Bearing Status: Full Weight Bearing  Transfers: Incidental Touching, Minimal Assistance  Bed Mobility: Minimal Assistance  Amulation Distance (ft): 60 feet  Ambulation: Incidental Touching  Assistive Device for Ambulation: Roller Walker  Ramp: Incidental Touching  Assistive Device for Ramp: Roller Walker  Discharge Recommendations: Home with:  [address] [facility] [place] with[de-identified] Family Support, Home Physical Therapy, Outpatient Physical Therapy   Eating: Independent  Grooming: Supervision  Bathing: Maximum Assistance  Bathing: Maximum Assistance  Upper Body Dressing: Moderate Assistance  Lower Body Dressing: Maximum Assistance  Toileting: Total Assistance  Tub/Shower Transfer: Moderate Assistance  Toilet Transfer: Moderate Assistance  Cognition: Within Defined Limits  Orientation: Person, Time, Situation, Place       Allergies per EMR    Physical Exam:  Temp:  [97 7 °F (36 5 °C)-98 6 °F (37 °C)] 98 6 °F (37 °C)  HR:  [60-67] 61  Resp:  [20] 20  BP: (117-128)/(56-71) 117/71  SpO2:  [92 %-98 %] 98 %    General: alert, no apparent distress, cooperative and comfortable  HEENT:  Head: Normocephalic, no lesions, without obvious abnormality  LUNGS:  normal air entry, lungs clear to auscultation  ABDOMEN:  soft, non-tender   Bowel sounds normal  No masses, no organomegaly  EXTREMITIES: extremities normal, warm and well-perfused; no cyanosis, clubbing, or edema  NEURO:   mental status, speech normal, alert and oriented x3  PSYCH:  Alert and oriented, appropriate affect  Physical examination is otherwise unchanged from previous encounter, except as noted above  Diagnostic Studies: Reviewed, no new imaging  XR chest pa & lateral   Final Result by Lashaun Peters MD (11/03 1932)      Continued evidence of pulmonary vascular congestion with small effusions and left basilar subsegmental atelectasis      PICC line tip in the brachiocephalic vein projecting over the aortic arch  The examination demonstrates a significant  finding and was documented as such in [facility] for liaison and referring practitioner notification  Workstation performed: [id number]           Laboratory: Reviewed    Results from last 7 days   Lab Units 11/04/19  0429 10/31/19  1300 10/30/19  0538   HEMOGLOBIN g/dL 8 9* 9 3* 7 6*   HEMATOCRIT % 27 0* 27 4* 23 0*   WBC Thousand/uL 6 50 5 90 5 20     Results from last 7 days   Lab Units 11/05/19  0642 11/04/19  0429 11/03/19  0538   BUN mg/dL 73* 69* 70*   SODIUM mmol/L 133* 134* 133*   POTASSIUM mmol/L 3 9 4 4 4 4   CHLORIDE mmol/L 89* 90* 90*   CREATININE mg/dL 1 61* 1 66* 1 69*            ** Please Note: Fluency Direct voice to text software may have been used in the creation of this document   **

## 2019-11-05 NOTE — NURSING NOTE
Held torsemide 40mg evening dose due to BP manual reading of 108/52  Pulse was 61; O2 stat 98% room air  Pt has no complaints at this time and is sitting comfortably in recliner

## 2019-11-05 NOTE — PLAN OF CARE
Problem: CARDIOVASCULAR - ADULT  Goal: Maintains optimal cardiac output and hemodynamic stability  Description  INTERVENTIONS:  - Monitor I/O, vital signs and rhythm  - Monitor for S/S and trends of decreased cardiac output  - Administer and titrate ordered vasoactive medications to optimize hemodynamic stability  - Assess quality of pulses, skin color and temperature  - Assess for signs of decreased coronary artery perfusion  - Instruct patient to report change in severity of symptoms  Outcome: Progressing  Goal: Absence of cardiac dysrhythmias or at baseline rhythm  Description  INTERVENTIONS:  - Continuous cardiac monitoring, vital signs, obtain 12 lead EKG if ordered  - Administer antiarrhythmic and heart rate control medications as ordered  - Monitor electrolytes and administer replacement therapy as ordered  Outcome: Progressing     Problem: RESPIRATORY - ADULT  Goal: Achieves optimal ventilation and oxygenation  Description  INTERVENTIONS:  - Assess for changes in respiratory status  - Assess for changes in mentation and behavior  - Position to facilitate oxygenation and minimize respiratory effort  - Oxygen administered by appropriate delivery if ordered  - Initiate smoking cessation education as indicated  - Encourage broncho-pulmonary hygiene including cough, deep breathe, Incentive Spirometry  - Assess the need for suctioning and aspirate as needed  - Assess and instruct to report SOB or any respiratory difficulty  - Respiratory Therapy support as indicated  Outcome: Progressing     Problem: GASTROINTESTINAL - ADULT  Goal: Maintains adequate nutritional intake  Description  INTERVENTIONS:  - Monitor percentage of each meal consumed  - Identify factors contributing to decreased intake, treat as appropriate  - Assist with meals as needed  - Monitor I&O, weight, and lab values if indicated  - Obtain nutrition services referral as needed  Outcome: Progressing     Problem: METABOLIC, FLUID AND ELECTROLYTES - ADULT  Goal: Electrolytes maintained within normal limits  Description  INTERVENTIONS:  - Monitor labs and assess patient for signs and symptoms of electrolyte imbalances  - Administer electrolyte replacement as ordered  - Monitor response to electrolyte replacements, including repeat lab results as appropriate  - Instruct patient on fluid and nutrition as appropriate  Outcome: Progressing  Goal: Fluid balance maintained  Description  INTERVENTIONS:  - Monitor labs   - Monitor I/O and WT  - Instruct patient on fluid and nutrition as appropriate  - Assess for signs & symptoms of volume excess or deficit  Outcome: Progressing  Goal: Glucose maintained within target range  Description  INTERVENTIONS:  - Monitor Blood Glucose as ordered  - Assess for signs and symptoms of hyperglycemia and hypoglycemia  - Administer ordered medications to maintain glucose within target range  - Assess nutritional intake and initiate nutrition service referral as needed  Outcome: Progressing     Problem: SKIN/TISSUE INTEGRITY - ADULT  Goal: Skin integrity remains intact  Description  INTERVENTIONS  - Identify patients at risk for skin breakdown  - Assess and monitor skin integrity  - Assess and monitor nutrition and hydration status  - Monitor labs (i e  albumin)  - Assess for incontinence   - Turn and reposition patient  - Assist with mobility/ambulation  - Relieve pressure over bony prominences  - Avoid friction and shearing  - Provide appropriate hygiene as needed including keeping skin clean and dry  - Evaluate need for skin moisturizer/barrier cream  - Collaborate with interdisciplinary team (i e  Nutrition, Rehabilitation, etc )   - Patient/family teaching  Outcome: Progressing  Goal: Oral mucous membranes remain intact  Description  INTERVENTIONS  - Assess oral mucosa and hygiene practices  - Implement preventative oral hygiene regimen  - Implement oral medicated treatments as ordered  - Initiate Nutrition services referral as needed  Outcome: Progressing     Problem: MUSCULOSKELETAL - ADULT  Goal: Maintain or return mobility to safest level of function  Description  INTERVENTIONS:  - Assess patient's ability to carry out ADLs; assess patient's baseline for ADL function and identify physical deficits which impact ability to perform ADLs (bathing, care of mouth/teeth, toileting, grooming, dressing, etc )  - Assess/evaluate cause of self-care deficits   - Assess range of motion  - Assess patient's mobility  - Assess patient's need for assistive devices and provide as appropriate  - Encourage maximum independence but intervene and supervise when necessary  - Involve family in performance of ADLs  - Assess for home care needs following discharge   - Consider OT consult to assist with ADL evaluation and planning for discharge  - Provide patient education as appropriate  Outcome: Progressing  Goal: Maintain proper alignment of affected body part  Description  INTERVENTIONS:  - Support, maintain and protect limb and body alignment  - Provide patient/ family with appropriate education  Outcome: Progressing     Problem: COPING  Goal: Pt/Family able to verbalize concerns and demonstrate effective coping strategies  Description  INTERVENTIONS:  - Assist patient/family to identify coping skills, available support systems and cultural and spiritual values  - Provide emotional support, including active listening and acknowledgement of concerns of patient and caregivers  - Reduce environmental stimuli, as able  - Provide patient education  - Assess for spiritual pain/suffering and initiate spiritual care, including notification of Pastoral Care or bianka based community as needed  - Assess effectiveness of coping strategies  Outcome: Progressing  Goal: Will report anxiety at manageable levels  Description  INTERVENTIONS:  - Administer medication as ordered  - Teach and encourage coping skills  - Provide emotional support  - Assess patient/family for anxiety and ability to cope  Outcome: Progressing     Problem: Prexisting or High Potential for Compromised Skin Integrity  Goal: Skin integrity is maintained or improved  Description  INTERVENTIONS:  - Identify patients at risk for skin breakdown  - Assess and monitor skin integrity  - Assess and monitor nutrition and hydration status  - Monitor labs   - Assess for incontinence   - Turn and reposition patient  - Assist with mobility/ambulation  - Relieve pressure over bony prominences  - Avoid friction and shearing  - Provide appropriate hygiene as needed including keeping skin clean and dry  - Evaluate need for skin moisturizer/barrier cream  - Collaborate with interdisciplinary team   - Patient/family teaching  - Consider wound care consult   Outcome: Progressing     Problem: Potential for Falls  Goal: Patient will remain free of falls  Description  INTERVENTIONS:  - Assess patient frequently for physical needs  -  Identify cognitive and physical deficits and behaviors that affect risk of falls    -  Melissa fall precautions as indicated by assessment   - Educate patient/family on patient safety including physical limitations  - Instruct patient to call for assistance with activity based on assessment  - Modify environment to reduce risk of injury  - Consider OT/PT consult to assist with strengthening/mobility  Outcome: Progressing     Problem: PAIN - ADULT  Goal: Verbalizes/displays adequate comfort level or baseline comfort level  Description  Interventions:  - Encourage patient to monitor pain and request assistance  - Assess pain using appropriate pain scale  - Administer analgesics based on type and severity of pain and evaluate response  - Implement non-pharmacological measures as appropriate and evaluate response  - Consider cultural and social influences on pain and pain management  - Notify physician/advanced practitioner if interventions unsuccessful or patient reports new pain  Outcome: Progressing

## 2019-11-05 NOTE — ASSESSMENT & PLAN NOTE
Wt Readings from Last 3 Encounters:   11/04/19 84 3 kg (185 lb 12 8 oz)   10/28/19 76 kg (167 lb 8 8 oz)   10/27/19 81 9 kg (180 lb 8 9 oz)     Patient with acute on chronic diastolic CHF  She did require BiPAP, IV diuresis during her ICU stay        Recommend daily weights with the same scale  Weights have been on different scales and therefore unreliable - discussed with nursing to have same scale daily        Pt does not appear fluid overload  Weights relatively stable and last 3 weights have been on same scale  Continue fluid restrictions  Trace bilateral lower extremity edema  Discussed with patient continue compression stockings and elevation of lower extremities  Patient was seen by Nephrology today        Nephrology restart patient's Demadex and Zaroxolyn and discontinue Lasix      IS given by therapy today and encouraged use     BNP [id number]  Chest xray done today

## 2019-11-05 NOTE — PLAN OF CARE
Problem: CARDIOVASCULAR - ADULT  Goal: Maintains optimal cardiac output and hemodynamic stability  Description  INTERVENTIONS:  - Monitor I/O, vital signs and rhythm  - Monitor for S/S and trends of decreased cardiac output  - Administer and titrate ordered vasoactive medications to optimize hemodynamic stability  - Assess quality of pulses, skin color and temperature  - Assess for signs of decreased coronary artery perfusion  - Instruct patient to report change in severity of symptoms  Outcome: Progressing  Goal: Absence of cardiac dysrhythmias or at baseline rhythm  Description  INTERVENTIONS:  - Continuous cardiac monitoring, vital signs, obtain 12 lead EKG if ordered  - Administer antiarrhythmic and heart rate control medications as ordered  - Monitor electrolytes and administer replacement therapy as ordered  Outcome: Progressing     Problem: RESPIRATORY - ADULT  Goal: Achieves optimal ventilation and oxygenation  Description  INTERVENTIONS:  - Assess for changes in respiratory status  - Assess for changes in mentation and behavior  - Position to facilitate oxygenation and minimize respiratory effort  - Oxygen administered by appropriate delivery if ordered  - Initiate smoking cessation education as indicated  - Encourage broncho-pulmonary hygiene including cough, deep breathe, Incentive Spirometry  - Assess the need for suctioning and aspirate as needed  - Assess and instruct to report SOB or any respiratory difficulty  - Respiratory Therapy support as indicated  Outcome: Progressing     Problem: GASTROINTESTINAL - ADULT  Goal: Maintains adequate nutritional intake  Description  INTERVENTIONS:  - Monitor percentage of each meal consumed  - Identify factors contributing to decreased intake, treat as appropriate  - Assist with meals as needed  - Monitor I&O, weight, and lab values if indicated  - Obtain nutrition services referral as needed  Outcome: Progressing     Problem: METABOLIC, FLUID AND ELECTROLYTES - ADULT  Goal: Electrolytes maintained within normal limits  Description  INTERVENTIONS:  - Monitor labs and assess patient for signs and symptoms of electrolyte imbalances  - Administer electrolyte replacement as ordered  - Monitor response to electrolyte replacements, including repeat lab results as appropriate  - Instruct patient on fluid and nutrition as appropriate  Outcome: Progressing  Goal: Fluid balance maintained  Description  INTERVENTIONS:  - Monitor labs   - Monitor I/O and WT  - Instruct patient on fluid and nutrition as appropriate  - Assess for signs & symptoms of volume excess or deficit  Outcome: Progressing  Goal: Glucose maintained within target range  Description  INTERVENTIONS:  - Monitor Blood Glucose as ordered  - Assess for signs and symptoms of hyperglycemia and hypoglycemia  - Administer ordered medications to maintain glucose within target range  - Assess nutritional intake and initiate nutrition service referral as needed  Outcome: Progressing     Problem: SKIN/TISSUE INTEGRITY - ADULT  Goal: Skin integrity remains intact  Description  INTERVENTIONS  - Identify patients at risk for skin breakdown  - Assess and monitor skin integrity  - Assess and monitor nutrition and hydration status  - Monitor labs (i e  albumin)  - Assess for incontinence   - Turn and reposition patient  - Assist with mobility/ambulation  - Relieve pressure over bony prominences  - Avoid friction and shearing  - Provide appropriate hygiene as needed including keeping skin clean and dry  - Evaluate need for skin moisturizer/barrier cream  - Collaborate with interdisciplinary team (i e  Nutrition, Rehabilitation, etc )   - Patient/family teaching  Outcome: Progressing  Goal: Oral mucous membranes remain intact  Description  INTERVENTIONS  - Assess oral mucosa and hygiene practices  - Implement preventative oral hygiene regimen  - Implement oral medicated treatments as ordered  - Initiate Nutrition services referral as needed  Outcome: Progressing     Problem: MUSCULOSKELETAL - ADULT  Goal: Maintain or return mobility to safest level of function  Description  INTERVENTIONS:  - Assess patient's ability to carry out ADLs; assess patient's baseline for ADL function and identify physical deficits which impact ability to perform ADLs (bathing, care of mouth/teeth, toileting, grooming, dressing, etc )  - Assess/evaluate cause of self-care deficits   - Assess range of motion  - Assess patient's mobility  - Assess patient's need for assistive devices and provide as appropriate  - Encourage maximum independence but intervene and supervise when necessary  - Involve family in performance of ADLs  - Assess for home care needs following discharge   - Consider OT consult to assist with ADL evaluation and planning for discharge  - Provide patient education as appropriate  Outcome: Progressing  Goal: Maintain proper alignment of affected body part  Description  INTERVENTIONS:  - Support, maintain and protect limb and body alignment  - Provide patient/ family with appropriate education  Outcome: Progressing     Problem: COPING  Goal: Pt/Family able to verbalize concerns and demonstrate effective coping strategies  Description  INTERVENTIONS:  - Assist patient/family to identify coping skills, available support systems and cultural and spiritual values  - Provide emotional support, including active listening and acknowledgement of concerns of patient and caregivers  - Reduce environmental stimuli, as able  - Provide patient education  - Assess for spiritual pain/suffering and initiate spiritual care, including notification of Pastoral Care or bianka based community as needed  - Assess effectiveness of coping strategies  Outcome: Progressing  Goal: Will report anxiety at manageable levels  Description  INTERVENTIONS:  - Administer medication as ordered  - Teach and encourage coping skills  - Provide emotional support  - Assess patient/family for anxiety and ability to cope  Outcome: Progressing     Problem: Prexisting or High Potential for Compromised Skin Integrity  Goal: Skin integrity is maintained or improved  Description  INTERVENTIONS:  - Identify patients at risk for skin breakdown  - Assess and monitor skin integrity  - Assess and monitor nutrition and hydration status  - Monitor labs   - Assess for incontinence   - Turn and reposition patient  - Assist with mobility/ambulation  - Relieve pressure over bony prominences  - Avoid friction and shearing  - Provide appropriate hygiene as needed including keeping skin clean and dry  - Evaluate need for skin moisturizer/barrier cream  - Collaborate with interdisciplinary team   - Patient/family teaching  - Consider wound care consult   Outcome: Progressing     Problem: Potential for Falls  Goal: Patient will remain free of falls  Description  INTERVENTIONS:  - Assess patient frequently for physical needs  -  Identify cognitive and physical deficits and behaviors that affect risk of falls    -  [place] fall precautions as indicated by assessment   - Educate patient/family on patient safety including physical limitations  - Instruct patient to call for assistance with activity based on assessment  - Modify environment to reduce risk of injury  - Consider OT/PT consult to assist with strengthening/mobility  Outcome: Progressing     Problem: PAIN - ADULT  Goal: Verbalizes/displays adequate comfort level or baseline comfort level  Description  Interventions:  - Encourage patient to monitor pain and request assistance  - Assess pain using appropriate pain scale  - Administer analgesics based on type and severity of pain and evaluate response  - Implement non-pharmacological measures as appropriate and evaluate response  - Consider cultural and social influences on pain and pain management  - Notify physician/advanced practitioner if interventions unsuccessful or patient reports new pain  Outcome: Progressing

## 2019-11-05 NOTE — PROGRESS NOTES
Progress Note - Eliana Campo 1934, 80 y o  female MRN: [id number]    Unit/Bed#: [facility] 268-02 Encounter: [id number]    Primary Care Provider: Ruy Rincon DO   Date and time admitted to hospital: 10/28/2019  1:22 PM      Wheezing  Assessment & Plan  IMPRESSION:     Continued evidence of pulmonary vascular congestion with small effusions and left basilar subsegmental atelectasis     PICC line tip in the brachiocephalic vein projecting over the aortic arch  Discussed with nursing staff will increase patient's nebulizers to q 6 hours, patient also has as needed albuterol inhaler  Patient needs overnight pulse ox study  Improvement noted with nebulizers  Renal artery stenosis [facility])  Assessment & Plan  Due for repeat renal u/s  Was unable to be completed during hospitalization secondary to patient discomfort  Can be coordinated as OP    Type 2 MI (myocardial infarction) [facility])  Assessment & Plan  Secondary to demand ischemia due to acute on chronic CHF exacerbation  Imdur started    Controlled type 2 diabetes mellitus with diabetic neuropathy, without long-term current use of insulin ([facility] [address] )  Assessment & Plan  Lab Results   Component Value Date    HGBA1C 5 0 08/16/2019       No results for input(s): POCGLU in the last 72 hours  Blood Sugar Average: Last 72 hrs:  Blood sugars were well controlled during her hospitalization  Her last A1c was 5 0  She did not require any insulin during hospitalization  Recommend diabetic diet      Hyperlipidemia  Assessment & Plan  No statin secondary to significant myalgias  Ideally patient should be on a statin given her hx  Last lipid panel 10/23/2019: Total cholesterol 144  Triglycerides 223  HDL 33  LDL 66  AZUL (obstructive sleep apnea)  Assessment & Plan  No CPAP at home  Last sleep study was approx 5 years ago  Did not wear home CPAP  Was wearing during hospitalization  Patient had an overnight pulse oximetry on 10/28 on 2 L nasal cannula  Patient's pulse oximetry was 90-99% 86 6% of the time and 100% 13 4% of the time  Continue 2L NC @ night  Patient had overnight pulse ox performed,  Less then 89% on room and  Patient does not qualify for home ox, will repeat tonight  Patient is also being followed by pulmonology now and will need to follow up outpatient for sleep study for home CPAP  Reviewed pulm consult  pt had ambulatory Pox on RA  sat 91%    Chronic anemia  Assessment & Plan  Her hemoglobin did drop from 8 1-->7 6  Patient was experiencing some mild shortness of breath and transfused for symptomatic anemia  Patient is status post transfusion 10/30 she received 1 unit of PRBCs     Hgb 11/4 8 9  Continue to transfuse with hemoglobin < 7 0  Acute-on-chronic kidney injury [facility])  Assessment & Plan  Reviewed Nephrology note  Patient with a baseline creatinine of 1 5-1 6  Patient restarted on Torsemide  Zaroxolyn, Lasix discontinued  She does need to have been repeat renal artery ultrasound secondary to history of renal artery stenosis  It was attempted to be completed during his recent hospitalization but was unable to be secondary to discomfort  Creatinine is continuing to improve  1 95--> 2 08 --> 1 99 --> 1 84 --> 1 76 --> 1 58--> 1 69-->1 66    Patients potassium 3 9 will continue to monitor, may need potassium supplementation  Patient will follow up with Nephrology outpatient the         Hyponatremia  Assessment & Plan  Sodium is stable at this time  Mild decrease and asymptomatic  Continue with fluid restrictions  NA-->134    History of aortic valve replacement  Assessment & Plan  Bioprosthetic valve  Last echo was 10/21/2019  Showing moderate left ventricle systolic dysfunction with an EF 41%  Severe hypokinesis to akinesis in the distal half of the anterior, lateral and inferior walls with akinesis of the distal 3rd of the septum   The apex is akinetic to paradoxical  The base of the heart contracts vigorously  Although these findings could be secondary to coronary artery disease, they also are consistent with Takotsubo's syndrome  Grade 2 left ventricle diastolic dysfunction  Moderate to severe MR  Calcified tricuspid aortic valve  Moderate TR  Mild TX    Depression  Assessment & Plan  Stable  Continue sertraline 25 mg daily    Acute on chronic diastolic heart failure (HCC)  Assessment & Plan  Wt Readings from Last 3 Encounters:   11/04/19 84 3 kg (185 lb 12 8 oz)   10/28/19 76 kg (167 lb 8 8 oz)   10/27/19 81 9 kg (180 lb 8 9 oz)     Patient with acute on chronic diastolic CHF  She did require BiPAP, IV diuresis during her ICU stay        Recommend daily weights with the same scale  Weights have been on different scales and therefore unreliable - discussed with nursing to have same scale daily        Pt does not appear fluid overload  Weights relatively stable and last 3 weights have been on same scale  Continue fluid restrictions  Trace bilateral lower extremity edema  Discussed with patient continue compression stockings and elevation of lower extremities  Patient was seen by Nephrology today  Nephrology restart patient's Demadex and Zaroxolyn and discontinue Lasix      IS given by therapy today and encouraged use     BNP 61837  Chest xray done today        Essential hypertension  Assessment & Plan  BP controlled  Continue metoprolol 12 5 mg b i d  Demadex, and Zaroxolyn 5 mg tablet daily  Imdur 30mg added 11/1  Caution on adjustments with change in diuretic and imdur with impending d/c home Wed  H/O: CVA (cerebrovascular accident)  Assessment & Plan  Continue with Plavix    Hypothyroidism  Assessment & Plan   Continue levothyroxine 112 mcg daily  TSH within normal limits 10/21/2019    * SDH (subdural hematoma) [facility])  Assessment & Plan  Secondary to a fall 10/8  Patient was admitted to [facility] 10/8 through 10/10    She was evaluated by Neurosurgery who recommended conservative management of SDH  VTE Pharmacologic Prophylaxis:   Pharmacologic: Heparin Drip  Mechanical VTE Prophylaxis in Place: Yes    Current Length of Stay: 8 day(s)    Current Patient Status: Inpatient Rehab     Discharge Plan: As per treatment team     Code Status: Level 1 - Full Code    Subjective:   Patient seen today seated in wheelchair,  Overall patient states she is feeling much better in regards to shortness of breath as well as wheezing  Patient does state she get  Shortness of breath with exertion  Patient did have pulse ox study, and patient had 3 minutes of desaturation less than 89 on room air  Patient currently denies chest pain palpitations dizziness and lightheadedness  Patient denies abdominal pain, nausea, vomiting, no difficulties with urination  Patient is currently maintaining fluid restrictions  Nursing staff does state the patient appears to be short of breath  Patient was seen by Nephrology today  Objective:     Vitals:   Temp (24hrs), Av 5 °F (36 4 °C), Min:97 °F (36 1 °C), Max:97 8 °F (36 6 °C)    Temp:  [97 °F (36 1 °C)-97 8 °F (36 6 °C)] 97 7 °F (36 5 °C)  HR:  [60-67] 67  Resp:  [20] 20  BP: (111-128)/(56-65) 128/58  SpO2:  [92 %-97 %] 94 %  Body mass index is 37 53 kg/m²  Review of Systems   Constitutional: Negative for appetite change, chills, fatigue, fever and unexpected weight change  Respiratory: Negative for cough, chest tightness, shortness of breath and wheezing  Cardiovascular: Positive for leg swelling (stable  denies calf pain)  Negative for chest pain and palpitations  Gastrointestinal: Negative for abdominal pain, constipation, diarrhea, nausea and vomiting  Genitourinary: Negative for difficulty urinating and dysuria  Musculoskeletal: Positive for arthralgias (posterior shoulder) and back pain  Negative for myalgias and neck stiffness     Neurological: Negative for dizziness, syncope, light-headedness and headaches         + restless leg   Psychiatric/Behavioral: The patient is nervous/anxious  Input and Output Summary (last 24 hours): Intake/Output Summary (Last 24 hours) at 11/5/2019 1349  Last data filed at 11/5/2019 1309  Gross per 24 hour   Intake 710 ml   Output 1150 ml   Net -440 ml       Physical Exam:     Physical Exam   Constitutional: She is oriented to person, place, and time  She appears well-developed and well-nourished  No distress  Obese   Neck: No JVD present  Cardiovascular: Normal rate, regular rhythm and normal heart sounds  No murmur heard  Trace B/L LE edema  Compression socks in place   Pulmonary/Chest: Effort normal  No respiratory distress  She has wheezes (diffuse expiratory)  She has no rales  Neurological: She is alert and oriented to person, place, and time  No focal deficits   Skin: Skin is warm and dry  Psychiatric: She has a normal mood and affect  Her behavior is normal  Judgment and thought content normal    Nursing note and vitals reviewed        Additional Data:     Labs:    Results from last 7 days   Lab Units 11/04/19  0429   WBC Thousand/uL 6 50   HEMOGLOBIN g/dL 8 9*   HEMATOCRIT % 27 0*   PLATELETS Thousands/uL 233   NEUTROS PCT % 68*   LYMPHS PCT % 23*   MONOS PCT % 7   EOS PCT % 2     Results from last 7 days   Lab Units 11/05/19  0642   SODIUM mmol/L 133*   POTASSIUM mmol/L 3 9   CHLORIDE mmol/L 89*   CO2 mmol/L 33*   BUN mg/dL 73*   CREATININE mg/dL 1 61*   ANION GAP mmol/L 11   CALCIUM mg/dL 9 3   GLUCOSE RANDOM mg/dL 120*                       Labs reviewed    Imaging:    Imaging reviewed    Recent Cultures (last 7 days):           Last 24 Hours Medication List:     Current Facility-Administered Medications:  acetaminophen 650 mg Oral Q6H PRN Anton Shrestha MD   albuterol 2 puff Inhalation Q4H PRN Anton Shrestha MD   albuterol 2 5 mg Nebulization TID Leanne May MD   clopidogrel 75 mg Oral Daily Anton Shrestha MD   gabapentin 300 mg Oral TID Leanne May MD heparin (porcine) 5,000 Units Subcutaneous Q8H [address] Anton Shrestha MD   isosorbide mononitrate 30 mg Oral Daily NATHEN Escamilla   levothyroxine 112 mcg Oral Daily Anton Shrestha MD   lidocaine 3 patch Topical Daily PRN Sky Boateng MD   menthol-methyl salicylate  Apply externally 4x Daily PRN Anton Shrestha MD   methocarbamol 500 mg Oral Q6H PRN Sky Boateng MD   [START ON 11/6/2019] metolazone 5 mg Oral Q48H Tona Kay MD   metoprolol tartrate 25 mg Oral Q12H [address] David Kidd MD   nystatin  Topical BID Anton Shrestha MD   pantoprazole 20 mg Oral Daily Before Breakfast Leslie Hernandez MD   polyethylene glycol 17 g Oral Daily PRN Sky Boateng MD   sertraline 25 mg Oral HS Anton Shrestha MD   torsemide 40 mg Oral BID Tona Kay MD   trolamine salicylate 1 application Topical 4x Daily NATHEN Villalobos*Modal software was used to dictate this note  It may contain errors with dictating incorrect words or incorrect spelling  Please contact the provider directly with any questions

## 2019-11-05 NOTE — PROGRESS NOTES
Physical Medicine and Rehabilitation Progress Note   Ryder Garcia 80 y o  female MRN: [id number]  Unit/Bed#: Sadie Mao 377-24 Encounter: [id number]    HPI: Ryder Garcia is a 80 y o  female who presented to the [address] [place] [address] with generalized weakness  Patient formally been admitted from October 8th and discharged home on October 10th after fall at home  At the time workup was significant for subdural hematoma  Conservative management was recommended that time  Patient then re-presented on 10/12/19 with weakness and gait instability  During the 2nd admission, patient needed up with acute respiratory failure, requiring admission to ICU  Pulmonary function eventually did improve to the point where patient was able to be discharged to the [facility] on 10/19/19  Unfortunately patient's respiratory function decline, patient found to be in significant respiratory distress on October 21st, necessitated discharge back to acute hospital   Patient was found to have [place]NP of 27[address]  In addition imaging was positive for evidence of overload  Patient was diuresed with torsemide  Eventually pulmonary function improved to the point where patient could return to the [facility]  She was admitted to [facility] on 10/28/19  Chief Complaint:     f/u TBI and f/u ambulatory dysfunction    Interval: No acute events overnight  feels very well today  Results from Sunday's overnight pulse ox reviewed; patient does not qualify for home O2 as her saturations <89% was only 3min (requires cumulative 5mins or more)  Patient otherwise reports being ready for discharge home  No questions at this time  ROS: A 10 point ROS was performed; negative except as noted above  Assessment/Plan:    * SDH (subdural hematoma) ([facility])  Assessment & Plan  · Conservative management was recommended  · Patient with a right subdural hematoma  · Discussed with neurosurgery, cleared for DVT prophylaxis, and plavix   Per service, bleed looks to have resolved  No need for repeat CTOH unless neurologic decline noted  CKD (chronic kidney disease) stage 3, GFR 30-59 ml/min ([facility])  Assessment & Plan  Results from last 7 days   Lab Units 11/05/19  0642 11/04/19  0429 11/03/19  0538   CREATININE mg/dL 1 61* 1 66* 1 69*     · Continue monitoring kidney function via intermittent BMP  · Avoid nephrotoxic meds/NSAIDs  · Nephrology team following, appreciate recs    Renal artery stenosis [facility])  Assessment & Plan  · Nephrology service is following  · Patient may require repeat ultrasound with pain medications    AZUL (obstructive sleep apnea)  Assessment & Plan  · Continue BiPAP at night  · Discussed with Pulmonology service, Dr Anita Jacobs; patient will likely not be able to be discharged home on BiPAP unless hypercapneic  · Overnight pulse ox study demonstrated SpO2 of <89% for only 3 mins; patient does not qualify for home O2 at this time       Chronic anemia  Assessment & Plan  Results from last 7 days   Lab Units 11/04/19  0429 10/31/19  1300 10/30/19  0538   HEMOGLOBIN g/dL 8 9* 9 3* 7 6*     · Monitor CBC intermittently   · Transfuse for Hgb <7  · Underwent another transfusion on 10/30, with appropriate response noted in H/h    Hyponatremia  Assessment & Plan  Results from last 7 days   Lab Units 11/05/19  0642 11/04/19  0429 11/03/19  0538   SODIUM mmol/L 133* 134* 133*     · Monitor BMP intermittently    Depression  Assessment & Plan  · Continue sertraline    Acute on chronic diastolic heart failure (HCC)  Assessment & Plan  Wt Readings from Last 3 Encounters:   11/04/19 84 3 kg (185 lb 12 8 oz)   10/28/19 76 kg (167 lb 8 8 oz)   10/27/19 81 9 kg (180 lb 8 9 oz)     · Monitor daily weights  · Torsemide resumed  · Metolazone started as well    Essential hypertension  Assessment & Plan  Temp:  [97 7 °F (36 5 °C)-98 6 °F (37 °C)] 98 6 °F (37 °C)  HR:  [60-67] 61  Resp:  [20] 20  BP: (117-128)/(56-71) 117/71    · Continue Metoprolol   · IM service managing anti-hypertensives, adjusting as needed    H/O: CVA (cerebrovascular accident)  Assessment & Plan  · Plavix was on hold due to bleed  · Discussed with neurosurgery  Patient now cleared to resume  Hypothyroidism  Assessment & Plan  · Continue Synthroid    # Skin  · Encourage regular turning as patient at risk for skin breakdown  · Staff to continue patient education on Q2h turning  · Rehabilitation team to perform skin checks regularly     # Bowel  · Patient reports no constipation  · To ensure regular BMs, bowel regimen consisting of:  colace , dulcolax suppository  and miralax     # Bladder  · Patient voiding spontaneously    # Pain  · Continue tylenol, for max of 3gm daily  # Rehab Psych   · There are no psychological or psychiatric problems identified    # Other  - Diet/Nutrition:        Diet Orders   (From admission, onward)             Start     Ordered    10/28/19 1323  Diet Cardiovascular; Cardiac; Fluid Restriction 1200 ML, Consistent Carbohydrate Diet Level 2 (5 carb servings/75 grams CHO/meal)  Diet effective now     Question Answer Comment   Diet Type Cardiovascular    Cardiac Cardiac    Other Restriction(s): Fluid Restriction 1200 ML    Other Restriction(s): Consistent Carbohydrate Diet Level 2 (5 carb servings/75 grams CHO/meal)    RD to adjust diet per protocol?  Yes        10/28/19 1323              - DVT prophy: Sequential compression device (Venodyne)  and Heparin  - GI ppx: Pantaprazole  - Nausea: None  - Supplements: None  - Sleep: None    Disposition: ome 11/06/19    CODE: Level 1: Full Code Scheduled Meds:    Current Facility-Administered Medications:  acetaminophen 650 mg Oral Q6H PRN Anton Shrestha MD   albuterol 2 puff Inhalation Q4H PRN Anton Shrestha MD   albuterol 2 5 mg Nebulization TID Iris Becerril MD   clopidogrel 75 mg Oral Daily Anton Shrestha MD   gabapentin 300 mg Oral TID Anton Shrestha MD   heparin (porcine) 5,000 Units Subcutaneous Q8H [facility] Anton BATISTA MD Fady   isosorbide mononitrate 30 mg Oral Daily NATHEN Escamilla   levothyroxine 112 mcg Oral Daily Anton Shrestha MD   lidocaine 3 patch Topical Daily PRN Leidy Butler MD   menthol-methyl salicylate  Apply externally 4x Daily PRN Leidy Butler MD   methocarbamol 500 mg Oral Q6H PRN Leidy Butler MD   [START ON 11/6/2019] metolazone 5 mg Oral Q48H Oralee MD Keren   metoprolol tartrate 25 mg Oral Q12H [address] MD   nystatin  Topical BID Leidy Butler MD   pantoprazole 20 mg Oral Daily Before Breakfast Jeremy Cazares MD   polyethylene glycol 17 g Oral Daily PRN Leidy Butler MD   sertraline 25 mg Oral HS Anton Shrestha MD   torsemide 40 mg Oral BID Oralee MD Keren   trolamine salicylate 1 application Topical 4x Daily NATHEN Escamilla        Objective:    Functional Update:  Physical Therapy Occupational Therapy   Weight Bearing Status: Full Weight Bearing  Transfers: Incidental Touching, Minimal Assistance  Bed Mobility: Minimal Assistance  Amulation Distance (ft): 60 feet  Ambulation: Incidental Touching  Assistive Device for Ambulation: Roller Walker  Ramp: Incidental Touching  Assistive Device for Ramp: Roller Walker  Discharge Recommendations: Home with:  [address] with[de-identified] Family Support, Home Physical Therapy, Outpatient Physical Therapy   Eating: Independent  Grooming: Supervision  Bathing: Maximum Assistance  Bathing: Maximum Assistance  Upper Body Dressing: Moderate Assistance  Lower Body Dressing: Maximum Assistance  Toileting: Total Assistance  Tub/Shower Transfer: Moderate Assistance  Toilet Transfer:  Moderate Assistance  Cognition: Within Defined Limits  Orientation: Person, Time, Situation, Place       Allergies per EMR    Physical Exam:  Temp:  [97 7 °F (36 5 °C)-98 6 °F (37 °C)] 98 6 °F (37 °C)  HR:  [60-67] 61  Resp:  [20] 20  BP: (117-128)/(56-71) 117/71  SpO2:  [92 %-98 %] 98 %    General: alert, no apparent distress, cooperative and comfortable  HEENT: Head: Normocephalic, no lesions, without obvious abnormality  LUNGS:  normal air entry, lungs clear to auscultation  ABDOMEN:  soft, non-tender  Bowel sounds normal  No masses, no organomegaly  EXTREMITIES:  extremities normal, warm and well-perfused; no cyanosis, clubbing, or edema  NEURO:   mental status, speech normal, alert and oriented x3  PSYCH:  Alert and oriented, appropriate affect  Physical examination is otherwise unchanged from previous encounter, except as noted above  Diagnostic Studies: Reviewed, no new imaging  XR chest pa & lateral   Final Result by Jorge Choi MD (11/03 1932)      Continued evidence of pulmonary vascular congestion with small effusions and left basilar subsegmental atelectasis      PICC line tip in the brachiocephalic vein projecting over the aortic arch  The examination demonstrates a significant  finding and was documented as such in [facility] for liaison and referring practitioner notification  Workstation performed: [id number]           Laboratory: Reviewed     Results from last 7 days   Lab Units 11/04/19  0429 10/31/19  1300 10/30/19  0538   HEMOGLOBIN g/dL 8 9* 9 3* 7 6*   HEMATOCRIT % 27 0* 27 4* 23 0*   WBC Thousand/uL 6 50 5 90 5 20     Results from last 7 days   Lab Units 11/05/19  0642 11/04/19  0429 11/03/19  0538   BUN mg/dL 73* 69* 70*   SODIUM mmol/L 133* 134* 133*   POTASSIUM mmol/L 3 9 4 4 4 4   CHLORIDE mmol/L 89* 90* 90*   CREATININE mg/dL 1 61* 1 66* 1 69*            ** Please Note: Fluency Direct voice to text software may have been used in the creation of this document   **

## 2019-11-05 NOTE — ASSESSMENT & PLAN NOTE
IMPRESSION:     Continued evidence of pulmonary vascular congestion with small effusions and left basilar subsegmental atelectasis     PICC line tip in the brachiocephalic vein projecting over the aortic arch  Discussed with nursing staff will increase patient's nebulizers to q 6 hours, patient also has as needed albuterol inhaler  Patient needs overnight pulse ox study  Improvement noted with nebulizers

## 2019-11-05 NOTE — PROGRESS NOTES
Follow up Consultation    Nephrology   Enoch Ogden 80 y o  female MRN: [id number]  Unit/Bed#: [facility] 268-02 Encounter: [id number]      Physician Requesting Consult: Leidy Butler MD  Reason for Consult:  Acute kidney injury       ASSESSMENT/PLAN:    1)Acute kidney injury (POA) on CKD stage 3:  - HERLINDA most likely secondary to ischemic injury from hemodynamic perturbations plus renal venous congestion  - After review of records it appears that the patient has a baseline Creatinine of 1 5-1 6 mg/dL  - patient's creatinine today is at 1 61 mg/dL  Creatinine stable and back to baseline  - Avoid nephrotoxins, adjust meds to appropriate GFR   - Acid base and lytes stable except mild hyponatremia and alkalosis  - clinically patient appears to be minimally hypervolemic  - DC Lasix and change to torsemide 40 mg p o  B i d  And metolazone 5 mg p o  Q 48 hours  - concern for needing imaging for renal artery stenosis  Patient has a history of a stent on 1 side  At this time she is not able to get vascular Dopplers imaging, MRA was ordered however discussed with the patient the risks for NSF in light of acute kidney injury and that performing the test will not change her current management  She is agreeable and does not wish to go ahead with MRA   - Clinically patient is not uremic and there is no acute indication for renal replacement therapy (dialysis)  - Optimize hemodynamic status to avoid delay in renal recovery  - check BMP, magnesium, phosphorus in a m   - Await renal recovery  - Place on a renal diet when allowed diet order    - Strict I/O   - stable from renal standpoint for Discharge have sent the office a message to arrange for outpt follow up in a week with labs    2)Blood pressure/hypertension:  - Optimize hemodynamics   - Maintain MAP > 65mmHg  - Avoid BP fluctuations  - on metoprolol 25 mg p o  B i d  Imdur 30 mg p o   Q day    3)H/H/anemia:  - most recent hemoglobin at 8 9 grams/deciliter  - maintain hemoglobin greater than 8 grams/deciliter    4)Volume status:  - Clinically patient appears to be euvolemic to minimally hypervolemic  - DC IV Lasix and change to torsemide 40 mg p o  B i d  And metolazone 5 mg p o  Q 48 hours  5)Hyponatremia:  - Likely due to decreased free water clearance  - Likely due to decreased distal sodium delivery  - Continue to monitor for now, most recent sodium 133 mEq improved from yesterday    6)CKD:  - Most likely secondary to renal vascular disease plus age-related nephron loss  - will arrange for follow up post hospitalization   - follows with Dr Sunny Blevins for nephrology as an outpatient    7) Subdural hematoma:  - management as per Primary team    8)CHF:  - Management as per primary team  - most recent EF of 41%  - follow-up with cardiology    Thanks for the consult  Will continue to follow  Please call with questions/ concerns  Above-mentioned orders and Plan with regards to acute kidney injury was discussed with the team in [address] MD, [facility], 2019, 8:06 AM              Objective :   Patient seen and examined in her room no overnight events hemodynamically stable remains afebrile, urine output documented close to 1 1 L in last 24 hours  No weight is documented from today thus far  feels well, going home tomorrow      PHYSICAL EXAM  /59 (BP Location: Right arm)   Pulse 60   Temp 97 8 °F (36 6 °C) (Tympanic)   Resp 20   Ht 4' 11" (1 499 m)   Wt 84 3 kg (185 lb 12 8 oz)   LMP  (LMP Unknown)   SpO2 97%   BMI 37 53 kg/m²   Temp (24hrs), Av 4 °F (36 3 °C), Min:97 °F (36 1 °C), Max:97 8 °F (36 6 °C)        Intake/Output Summary (Last 24 hours) at 2019 0806  Last data filed at 2019 2300  Gross per 24 hour   Intake 350 ml   Output 1100 ml   Net -750 ml       I/O last 24 hours: In: 26 [P O :450;  I V :20]  Out: 1100 [Urine:1100]      Current Weight: Weight - Scale: 84 3 kg (185 lb 12 8 oz)  First Weight: Weight - Scale: 83 9 kg (184 lb 15 5 oz)  Physical Exam   Constitutional: She is oriented to person, place, and time  She appears well-developed and well-nourished  No distress  HENT:   Head: Normocephalic and atraumatic  Mouth/Throat: Oropharynx is clear and moist  No oropharyngeal exudate  Eyes: Conjunctivae are normal  No scleral icterus  Neck: Neck supple  No JVD present  Cardiovascular: Normal heart sounds  Exam reveals no friction rub  Pulmonary/Chest: Effort normal  She has no wheezes  Left basal crackles   Abdominal: Soft  She exhibits no mass  There is no tenderness  Musculoskeletal: She exhibits no edema  Neurological: She is alert and oriented to person, place, and time  Skin: Skin is warm  No rash noted  She is not diaphoretic  Psychiatric: She has a normal mood and affect  Her behavior is normal    Nursing note and vitals reviewed  Review of Systems   Constitutional: Negative for chills, fatigue and fever  HENT: Negative for congestion  Cardiovascular: Negative for chest pain, palpitations and leg swelling  Gastrointestinal: Negative for abdominal pain, constipation, diarrhea, nausea and vomiting  Genitourinary: Negative for dysuria  Musculoskeletal: Negative for back pain  Neurological: Negative for dizziness and headaches  Psychiatric/Behavioral: Negative for confusion  All other systems reviewed and are negative        Scheduled Meds:    Current Facility-Administered Medications:  acetaminophen 650 mg Oral Q6H PRN Anton Shrestha MD   albuterol 2 puff Inhalation Q4H PRN Anton Shrestha MD   albuterol 2 5 mg Nebulization TID Jordan Baird MD   clopidogrel 75 mg Oral Daily Jordan Baird MD   furosemide 80 mg Intravenous Q12H Yamini Murcia DO   gabapentin 300 mg Oral TID Jordan Baird MD   heparin (porcine) 5,000 Units Subcutaneous Q8H [facility] Anton Shrestha MD   isosorbide mononitrate 30 mg Oral Daily NATHEN Escamilla   levothyroxine 112 mcg Oral Daily Anton Shrestha MD   lidocaine 3 patch Topical Daily PRN Leidy Butler MD   menthol-methyl salicylate  Apply externally 4x Daily PRN Leidy Butler MD   methocarbamol 500 mg Oral Q6H PRN Leidy Butler MD   metolazone 5 mg Oral Daily Oralee MD Keren   metoprolol tartrate 25 mg Oral Q12H [address] Jeremy Cazares MD   nystatin  Topical BID Leidy Butler MD   pantoprazole 20 mg Oral Daily Before Breakfast Jeremy Cazares MD   polyethylene glycol 17 g Oral Daily PRN Anton Shrestha MD   sertraline 25 mg Oral HS Anton Shrestha MD   trolamine salicylate 1 application Topical 4x Daily NATHEN Escamilla       PRN Meds:   acetaminophen    albuterol    lidocaine    menthol-methyl salicylate    methocarbamol    polyethylene glycol    Continuous Infusions:       Invasive Devices: Invasive Devices     Peripherally Inserted Central Catheter Line            PICC Line 10/22/19 Left Brachial 13 days                  LABORATORY:    Results from last 7 days   Lab Units 11/05/19  0642 11/04/19  0429 11/03/19  0538 11/02/19  0553 11/01/19  0729 10/31/19  1300 10/31/19  0441 10/30/19  0538   WBC Thousand/uL  --  6 50  --   --   --  5 90  --  5 20   HEMOGLOBIN g/dL  --  8 9*  --   --   --  9 3*  --  7 6*   HEMATOCRIT %  --  27 0*  --   --   --  27 4*  --  23 0*   PLATELETS Thousands/uL  --  233  --   --   --  205  --  197   POTASSIUM mmol/L 3 9 4 4 4 4 4 1 4 1  --  3 9 3 8   CHLORIDE mmol/L 89* 90* 90* 91* 91*  --  90* 89*   CO2 mmol/L 33* 32* 35* 35* 34*  --  35* 36*   BUN mg/dL 73* 69* 70* 68* 68*  --  68* 72*   CREATININE mg/dL 1 61* 1 66* 1 69* 1 58* 1 76*  --  1 84* 1 99*   CALCIUM mg/dL 9 3 9 4 9 1 9 2 9 5  --  9 0 8 9      rest all reviewed    RADIOLOGY:  XR chest pa & lateral   Final Result by Estuardo Aaron MD (11/03 1932)      Continued evidence of pulmonary vascular congestion with small effusions and left basilar subsegmental atelectasis      PICC line tip in the brachiocephalic vein projecting over the aortic arch  The examination demonstrates a significant  finding and was documented as such in [facility] for liaison and referring practitioner notification  Workstation performed: [id number]           Rest all reviewed    Portions of the record may have been created with voice recognition software  Occasional wrong word or "sound a like" substitutions may have occurred due to the inherent limitations of voice recognition software  Read the chart carefully and recognize, using context, where substitutions have occurred  If you have any questions, please contact the dictating provider

## 2019-11-06 VITALS
OXYGEN SATURATION: 100 % | RESPIRATION RATE: 20 BRPM | DIASTOLIC BLOOD PRESSURE: 55 MMHG | BODY MASS INDEX: 37.25 KG/M2 | HEIGHT: 59 IN | WEIGHT: 184.8 LBS | TEMPERATURE: 97.9 F | HEART RATE: 64 BPM | SYSTOLIC BLOOD PRESSURE: 130 MMHG

## 2019-11-06 LAB
ANION GAP SERPL CALCULATED.3IONS-SCNC: 10 MMOL/L (ref 5–14)
BUN SERPL-MCNC: 74 MG/DL (ref 5–25)
CALCIUM SERPL-MCNC: 9 MG/DL (ref 8.4–10.2)
CHLORIDE SERPL-SCNC: 88 MMOL/L (ref 97–108)
CO2 SERPL-SCNC: 33 MMOL/L (ref 22–30)
CREAT SERPL-MCNC: 1.68 MG/DL (ref 0.6–1.2)
GFR SERPL CREATININE-BSD FRML MDRD: 28 ML/MIN/1.73SQ M
GLUCOSE P FAST SERPL-MCNC: 116 MG/DL (ref 70–99)
GLUCOSE SERPL-MCNC: 116 MG/DL (ref 70–99)
POTASSIUM SERPL-SCNC: 4 MMOL/L (ref 3.6–5)
SODIUM SERPL-SCNC: 131 MMOL/L (ref 137–147)

## 2019-11-06 PROCEDURE — 94640 AIRWAY INHALATION TREATMENT: CPT

## 2019-11-06 PROCEDURE — NC001 PR NO CHARGE: Performed by: PHYSICAL MEDICINE & REHABILITATION

## 2019-11-06 PROCEDURE — 99233 SBSQ HOSP IP/OBS HIGH 50: CPT | Performed by: INTERNAL MEDICINE

## 2019-11-06 PROCEDURE — 94762 N-INVAS EAR/PLS OXIMTRY CONT: CPT

## 2019-11-06 PROCEDURE — 80048 BASIC METABOLIC PNL TOTAL CA: CPT | Performed by: INTERNAL MEDICINE

## 2019-11-06 PROCEDURE — 94760 N-INVAS EAR/PLS OXIMETRY 1: CPT

## 2019-11-06 PROCEDURE — 99232 SBSQ HOSP IP/OBS MODERATE 35: CPT | Performed by: INTERNAL MEDICINE

## 2019-11-06 PROCEDURE — 99239 HOSP IP/OBS DSCHRG MGMT >30: CPT | Performed by: PHYSICAL MEDICINE & REHABILITATION

## 2019-11-06 RX ORDER — METOLAZONE 5 MG/1
5 TABLET ORAL AS NEEDED
Qty: 15 TABLET | Refills: 0 | Status: SHIPPED | OUTPATIENT
Start: 2019-11-06

## 2019-11-06 RX ORDER — ALBUTEROL SULFATE 2.5 MG/3ML
2.5 SOLUTION RESPIRATORY (INHALATION)
Qty: 25 VIAL | Refills: 3 | Status: SHIPPED | OUTPATIENT
Start: 2019-11-06 | End: 2020-02-27 | Stop reason: SDUPTHER

## 2019-11-06 RX ORDER — ALBUTEROL SULFATE 2.5 MG/3ML
2.5 SOLUTION RESPIRATORY (INHALATION)
Qty: 1 VIAL | Refills: 3 | Status: SHIPPED | OUTPATIENT
Start: 2019-11-06 | End: 2019-11-06

## 2019-11-06 RX ADMIN — ALBUTEROL SULFATE 2.5 MG: 2.5 SOLUTION RESPIRATORY (INHALATION) at 14:52

## 2019-11-06 RX ADMIN — ALBUTEROL SULFATE 2.5 MG: 2.5 SOLUTION RESPIRATORY (INHALATION) at 07:09

## 2019-11-06 RX ADMIN — ALBUTEROL SULFATE 2 PUFF: 90 AEROSOL, METERED RESPIRATORY (INHALATION) at 10:27

## 2019-11-06 RX ADMIN — NYSTATIN: 100000 POWDER TOPICAL at 08:14

## 2019-11-06 RX ADMIN — HEPARIN SODIUM 5000 UNITS: 5000 INJECTION INTRAVENOUS; SUBCUTANEOUS at 06:05

## 2019-11-06 RX ADMIN — METHOCARBAMOL 500 MG: 500 TABLET ORAL at 14:26

## 2019-11-06 RX ADMIN — ACETAMINOPHEN 650 MG: 325 TABLET ORAL at 14:25

## 2019-11-06 RX ADMIN — GABAPENTIN 300 MG: 300 CAPSULE ORAL at 08:14

## 2019-11-06 RX ADMIN — LIDOCAINE 3 PATCH: 50 PATCH CUTANEOUS at 13:02

## 2019-11-06 RX ADMIN — PANTOPRAZOLE 20 MG: 20 TABLET, DELAYED RELEASE ORAL at 06:05

## 2019-11-06 RX ADMIN — CLOPIDOGREL BISULFATE 75 MG: 75 TABLET, FILM COATED ORAL at 08:14

## 2019-11-06 RX ADMIN — TROLAMINE SALICYLATE 1 APPLICATION: 10 CREAM TOPICAL at 08:15

## 2019-11-06 RX ADMIN — LEVOTHYROXINE SODIUM 112 MCG: 112 TABLET ORAL at 06:05

## 2019-11-06 RX ADMIN — HEPARIN SODIUM 5000 UNITS: 5000 INJECTION INTRAVENOUS; SUBCUTANEOUS at 14:25

## 2019-11-06 NOTE — NURSING NOTE
Reassess pt breath sounds  No wheezing noted  Notice crackles bilaterally at the base of the lungs  Pt denies any shortness of breath at this time

## 2019-11-06 NOTE — ASSESSMENT & PLAN NOTE
Wt Readings from Last 3 Encounters:   11/06/19 83 8 kg (184 lb 12 8 oz)   10/28/19 76 kg (167 lb 8 8 oz)   10/27/19 81 9 kg (180 lb 8 9 oz)     Patient with acute on chronic diastolic CHF  She did require BiPAP, IV diuresis during her ICU stay        Recommend daily weights with the same scale  Weights have been on different scales and therefore unreliable - discussed with nursing to have same scale daily        Pt does not appear fluid overload  Weights relatively stable and last 3 weights have been on same scale  Continue fluid restrictions  Trace bilateral lower extremity edema  Discussed with patient continue compression stockings and elevation of lower extremities  Patient was seen by Nephrology today        Nephrology restart patient's Demadex and Zaroxolyn and discontinue Lasix      IS given by therapy today and encouraged use     BNP 93338

## 2019-11-06 NOTE — SOCIAL WORK
Fax to [facility], #[phone], to resume services (RN/PT/OT) for Pt  CM sent the script for [facility], as well as clinical notes       Oxygen referral sent via fax to [facility]

## 2019-11-06 NOTE — NURSING NOTE
Held metoprolol 25mg; torsemide 40mg; and isosorbide 30mg due to manual BP reading of 102/58  Pt has no complaints at this present time  Will continue to monitor

## 2019-11-06 NOTE — DISCHARGE SUMMARY
Discharge Summary - PMR   Jacqueline Cornejo 80 y o  female MRN: [id number]  Unit/Bed#: [facility] 906-30 Encounter: [id number]    Admission Date: 10/28/2019     Discharge Date:  11/06/19    Etiologic/Rehabilitation Diagnosis: Impairment of mobility, safety and Activities of Daily Living (ADLs) due to Brain Dysfunction:  02 22  Traumatic, Closed Injury    HPI: Jacqueline Cornejo is a 80 y o  female who presented to the [address] [place] [address] with generalized weakness  Patient formally been admitted from October 8th and discharged home on October 10th after fall at home  At the time workup was significant for subdural hematoma  Conservative management was recommended that time  Patient then re-presented on 10/12/19 with weakness and gait instability  During the 2nd admission, patient needed up with acute respiratory failure, requiring admission to ICU  Pulmonary function eventually did improve to the point where patient was able to be discharged to the [facility] on 10/19/19  Unfortunately patient's respiratory function decline, patient found to be in significant respiratory distress on October 21st, necessitated discharge back to acute hospital   Patient was found to have [place]NP [place] 46[address]  In addition imaging was positive for evidence of overload  Patient was diuresed with torsemide  Eventually pulmonary function improved to the point where patient could return to the [facility]  She was admitted to [facility] on 10/28/19  Procedures Performed During [facility] Admission: None    Acute Rehabilitation Center Course: Patient participated in a comprehensive interdisciplinary inpatient rehabilitation program which included involvment of MD, therapies (PT, OT, and/or SLP), RN, CM, SW, dietary, and psychology services  She was able to be advanced to an overall supervision level of assist and considered safe for discharge home with family  Please see below for patient's day to day management of medical needs        * SDH (subdural hematoma) ([facility])  Assessment & Plan  · Conservative management was recommended  · Patient with a right subdural hematoma  · Discussed with neurosurgery, cleared for DVT prophylaxis, and plavix  Per service, bleed looks to have resolved  No need for repeat CTOH unless neurologic decline noted  Wheezing  Assessment & Plan  · Continue nebulizer    CKD (chronic kidney disease) stage 3, GFR 30-59 ml/min ([facility])  Assessment & Plan  Results from last 7 days   Lab Units 11/06/19  0512 11/05/19  0642 11/04/19  0429   CREATININE mg/dL 1 68* 1 61* 1 66*     · Continue monitoring kidney function via intermittent BMP  · Avoid nephrotoxic meds/NSAIDs  · Nephrology team following, appreciate recs    Renal artery stenosis [facility])  Assessment & Plan  · Nephrology service is following  · Patient may require repeat ultrasound with pain medications    AZUL (obstructive sleep apnea)  Assessment & Plan  · Continue BiPAP at night  · Discussed with Pulmonology service, Dr Rodrick Rivera; patient will likely not be able to be discharged home on BiPAP unless hypercapneic  · Overnight pulse ox study demonstrated SpO2 of <89% for 9 mins; patient qualifies for home O2      Chronic anemia  Assessment & Plan  Results from last 7 days   Lab Units 11/04/19  0429 10/31/19  1300 10/30/19  0538   HEMOGLOBIN g/dL 8 9* 9 3* 7 6*     · Monitor CBC intermittently   · Transfuse for Hgb <7  · Underwent another transfusion on 10/30, with appropriate response noted in H/h    Hyponatremia  Assessment & Plan  Results from last 7 days   Lab Units 11/06/19  0512 11/05/19  0642 11/04/19  0429   SODIUM mmol/L 131* 133* 134*     · Monitor BMP intermittently  · Continue 1200ml fluid restriction until stated otherwise by PCP/nephrologist    Depression  Assessment & Plan  · Continue sertraline    Acute on chronic diastolic heart failure (HCC)  Assessment & Plan  Wt Readings from Last 3 Encounters:   11/06/19 83 8 kg (184 lb 12 8 oz)   10/28/19 76 kg (167 lb 8 8 oz)   10/27/19 81 9 kg (180 lb 8 9 oz)     · Monitor daily weights  · Torsemide resumed  · Metolazone started as well  · Discussed with renal today  Per service, continue torsemide 40mg BID  In addition, PRN metolazone 5mg if patient gains 2lbs in 2 days  Essential hypertension  Assessment & Plan  Temp:  [97 °F (36 1 °C)-98 7 °F (37 1 °C)] 97 °F (36 1 °C)  HR:  [60-63] 63  Resp:  [20-21] 21  BP: (102-138)/(52-71) 124/57    · Continue Metoprolol   · IM service managing anti-hypertensives, adjusting as needed    H/O: CVA (cerebrovascular accident)  Assessment & Plan  · Plavix was on hold due to bleed  · Discussed with neurosurgery  Patient now cleared to resume  Hypothyroidism  Assessment & Plan  · Continue Synthroid      Discharge Physical Examination:  General: alert, no apparent distress, cooperative and comfortable  HEENT:  Head: Normocephalic, no lesions, without obvious abnormality  LUNGS:  normal air entry, lungs clear to auscultation  ABDOMEN:  soft, non-tender  Bowel sounds normal  No masses, no organomegaly  EXTREMITIES:  extremities normal, warm and well-perfused; no cyanosis, clubbing, or edema  NEURO:   mental status, speech normal, alert and oriented x3  PSYCH:  Alert and oriented, appropriate affect  Significant Findings, Care, Treatment and Services Provided: Acute comprehensive interdisciplinary inpatient rehabilitation including PT, OT, SLP, RN, CM, SW, dietary, psychology, etc     Complications: none    Functional Status Upon Admission to [facility]  Mobility: supervision/CGA 40'  Transfers: supervision  ADLs: mod-max assist (especially lowers)    Functional Status Upon Discharge from [facility]:   Physical Therapy Occupational Therapy   Stand Pivot Supervision   Sit to Stand Supervision   Stand to Sit Supervision   Car Transfer Minimal Assist     Distance Walked (feet) 80 ft  (x2 )   Assist Device Roller Walker   Gait Pattern Slow Jenny;Decreased foot clearance; Step through   Limitations Noted In Endurance; Heel Strike;Speed;Strength   Walk Assist Level Close Supervision        ADLs: overall at supervision       Discharge Diagnosis: Impairment of mobility, safety and Activities of Daily Living (ADLs) due to Brain Dysfunction:  02 22  Traumatic, Closed Injury    Discharge Medications:   See after visit summary for reconciled discharge medications provided to patient and family  Condition at Discharge: stable     Discharge instructions/Information to patient and family:   See after visit summary for information provided to patient and family  Provisions for Follow-Up Care:  See after visit summary for information related to follow-up care and any pertinent home health orders  No future appointments  Disposition: Home    Planned Readmission: No    Discharge Statement   I spent 45 minutes discharging the patient  This time was spent on the day of discharge  I had direct contact with the patient on the day of discharge  Greater than 50% of the total time was spent examining patient, answering all patient questions, arranging and discussing plan of care with patient as well as directly providing post-discharge instructions  Additional time then spent on discharge activities  Discharge Medications:  See after visit summary for reconciled discharge medications provided to patient and family

## 2019-11-06 NOTE — ASSESSMENT & PLAN NOTE
No CPAP at home  Last sleep study was approx 5 years ago  Did not wear home CPAP  Was wearing during hospitalization  Patient had an overnight pulse oximetry on 10/28 on 2 L nasal cannula  Patient's pulse oximetry was 90-99% 86 6% of the time and 100% 13 4% of the time  Patient had overnight pulse ox performed,  Less then 89% on room air  Patient does not qualify for home ox, will repeat tonight  Patient is also being followed by pulmonology now and will need to follow up outpatient for sleep study for home CPAP  Reviewed pulm consult       pt had ambulatory Pox on RA  sat 91%

## 2019-11-06 NOTE — DISCHARGE INSTRUCTIONS
[facility] Discharge Instructions    Please continue your fluid restriction of 1200ml per day  Please note you are restricted from driving/operating a motorized vehicle/operating heavy machinery/etc until you are cleared by your doctors or through a formal driving evaluation  This service is offered through [address] [place] [address] driving evaluation program on [address] however this evaluation can be done at a site of your choosing  Please contact your family doctor for a referral when your family doctor clears you to perform this evaluation once your neurologic/physical deficits have stabilized  Please see your doctors listed in the follow up providers section of your discharge paperwork, and take the discharge paperwork with you to your appointments    Please note changes may have been made to your medications please refer to your discharge paperwork for your current medications and take this list with you to all your doctors appointments for your doctors to review    Please do not resume a home medication unless the medication reconciliation sheet indicates to do so, please do not assume that a medication that you were given a prescription for is the same as a medication you have at home based on both medications having the same name as dosages and frequency may have changed  Prior to your discharge from the hospital, your nurse has reviewed: your medications, when to take them, how to take them, what they are for, how much to take, and when your next dose is due; please follow these instructions as directed       Please check your blood pressure & heart rate/pulse prior to taking your blood pressure/heart rate medications and 1 hour after, please contact your family doctor or cardiologist immediately for a blood pressure below 100/60 and do not take the medications until speaking with them, please contact your family doctor or cardiologist immediately for blood pressure greater than 160/100; please contact your family doctor or cardiologist immediately for a heart rate/pulse lower than 61 and do not take the medications until speaking with them, please contact your family doctor or cardiologist immediately for heart rate/pulse greater than 100     Please avoid NSAID (including but not limited to advil, aleve, motrin, naproxen, ibuprofen, mobic, meloxicam, diclofenac etc) medications, anti platelet medications (including but not limited to plavix and plavix containing products), and any prescription blood thinners due to your already being on Plavix and aspirin and when combined with these other medications they can increase your risk of bleeding; you may be cleared to use these medications in the future but do not do so unless advised to do so by your doctors    Unless specifically noted in your medication list provided to you in your discharge paper work, please discuss with your family doctor prior to resuming any vitamins/minerals/supplements you may have been taking prior to your hospitalization     Please note a summary of your hospital stay with relevant information for your doctors has been sent to them, please confirm with your doctors at your follow up visits that they have received this summary and have them contact [address] [place] [address] if they have not received them along with any other medical records they may require

## 2019-11-06 NOTE — ASSESSMENT & PLAN NOTE
Reviewed Nephrology note  Patient with a baseline creatinine of 1 5-1 6  Patient restarted on Torsemide  Zaroxolyn, Lasix discontinued  She does need to have been repeat renal artery ultrasound secondary to history of renal artery stenosis  It was attempted to be completed during his recent hospitalization but was unable to be secondary to discomfort  Creatinine is continuing to improve  1 95--> 2 08 --> 1 99 --> 1 84 --> 1 76 --> 1 58--> 1 69-->1 66-->1 68    Patients potassium 4 0 will continue to monitor, may need potassium supplementation     Patient will follow up with Nephrology outpatient the

## 2019-11-06 NOTE — PLAN OF CARE
Problem: CARDIOVASCULAR - ADULT  Goal: Maintains optimal cardiac output and hemodynamic stability  Description  INTERVENTIONS:  - Monitor I/O, vital signs and rhythm  - Monitor for S/S and trends of decreased cardiac output  - Administer and titrate ordered vasoactive medications to optimize hemodynamic stability  - Assess quality of pulses, skin color and temperature  - Assess for signs of decreased coronary artery perfusion  - Instruct patient to report change in severity of symptoms  11/6/2019 1356 by Judy Harris RN  Outcome: Completed  11/6/2019 1108 by Judy Harirs RN  Outcome: Progressing  Goal: Absence of cardiac dysrhythmias or at baseline rhythm  Description  INTERVENTIONS:  - Continuous cardiac monitoring, vital signs, obtain 12 lead EKG if ordered  - Administer antiarrhythmic and heart rate control medications as ordered  - Monitor electrolytes and administer replacement therapy as ordered  11/6/2019 1356 by Judy Harris RN  Outcome: Completed  11/6/2019 1108 by Judy Harris RN  Outcome: Progressing     Problem: RESPIRATORY - ADULT  Goal: Achieves optimal ventilation and oxygenation  Description  INTERVENTIONS:  - Assess for changes in respiratory status  - Assess for changes in mentation and behavior  - Position to facilitate oxygenation and minimize respiratory effort  - Oxygen administered by appropriate delivery if ordered  - Initiate smoking cessation education as indicated  - Encourage broncho-pulmonary hygiene including cough, deep breathe, Incentive Spirometry  - Assess the need for suctioning and aspirate as needed  - Assess and instruct to report SOB or any respiratory difficulty  - Respiratory Therapy support as indicated  11/6/2019 1356 by Judy Harris RN  Outcome: Completed  11/6/2019 1108 by Judy Harris RN  Outcome: Progressing     Problem: GASTROINTESTINAL - ADULT  Goal: Maintains adequate nutritional intake  Description  INTERVENTIONS:  - Monitor percentage of each meal consumed  - Identify factors contributing to decreased intake, treat as appropriate  - Assist with meals as needed  - Monitor I&O, weight, and lab values if indicated  - Obtain nutrition services referral as needed  11/6/2019 1356 by Anamaria Bell RN  Outcome: Completed  11/6/2019 1108 by Anamaria Bell RN  Outcome: Progressing     Problem: METABOLIC, FLUID AND ELECTROLYTES - ADULT  Goal: Electrolytes maintained within normal limits  Description  INTERVENTIONS:  - Monitor labs and assess patient for signs and symptoms of electrolyte imbalances  - Administer electrolyte replacement as ordered  - Monitor response to electrolyte replacements, including repeat lab results as appropriate  - Instruct patient on fluid and nutrition as appropriate  11/6/2019 1356 by Anamaria Bell RN  Outcome: Completed  11/6/2019 1108 by Anamaria Bell RN  Outcome: Progressing  Goal: Fluid balance maintained  Description  INTERVENTIONS:  - Monitor labs   - Monitor I/O and WT  - Instruct patient on fluid and nutrition as appropriate  - Assess for signs & symptoms of volume excess or deficit  11/6/2019 1356 by Anamaria Bell RN  Outcome: Completed  11/6/2019 1108 by Anamaria Bell RN  Outcome: Progressing  Goal: Glucose maintained within target range  Description  INTERVENTIONS:  - Monitor Blood Glucose as ordered  - Assess for signs and symptoms of hyperglycemia and hypoglycemia  - Administer ordered medications to maintain glucose within target range  - Assess nutritional intake and initiate nutrition service referral as needed  11/6/2019 1356 by Anamaria Bell RN  Outcome: Completed  11/6/2019 1108 by Anamaria Bell RN  Outcome: Progressing     Problem: SKIN/TISSUE INTEGRITY - ADULT  Goal: Skin integrity remains intact  Description  INTERVENTIONS  - Identify patients at risk for skin breakdown  - Assess and monitor skin integrity  - Assess and monitor nutrition and hydration status  - Monitor labs (i e  albumin)  - Assess for incontinence   - Turn and reposition patient  - Assist with mobility/ambulation  - Relieve pressure over bony prominences  - Avoid friction and shearing  - Provide appropriate hygiene as needed including keeping skin clean and dry  - Evaluate need for skin moisturizer/barrier cream  - Collaborate with interdisciplinary team (i e  Nutrition, Rehabilitation, etc )   - Patient/family teaching  11/6/2019 1356 by Sanaz Jauregui RN  Outcome: Completed  11/6/2019 1108 by Sanaz Jauregui RN  Outcome: Progressing  Goal: Oral mucous membranes remain intact  Description  INTERVENTIONS  - Assess oral mucosa and hygiene practices  - Implement preventative oral hygiene regimen  - Implement oral medicated treatments as ordered  - Initiate Nutrition services referral as needed  11/6/2019 1356 by Sanaz Jauregui RN  Outcome: Completed  11/6/2019 1108 by Sanaz Jauregui RN  Outcome: Progressing     Problem: MUSCULOSKELETAL - ADULT  Goal: Maintain or return mobility to safest level of function  Description  INTERVENTIONS:  - Assess patient's ability to carry out ADLs; assess patient's baseline for ADL function and identify physical deficits which impact ability to perform ADLs (bathing, care of mouth/teeth, toileting, grooming, dressing, etc )  - Assess/evaluate cause of self-care deficits   - Assess range of motion  - Assess patient's mobility  - Assess patient's need for assistive devices and provide as appropriate  - Encourage maximum independence but intervene and supervise when necessary  - Involve family in performance of ADLs  - Assess for home care needs following discharge   - Consider OT consult to assist with ADL evaluation and planning for discharge  - Provide patient education as appropriate  11/6/2019 1356 by Sanaz Jauregui RN  Outcome: Completed  11/6/2019 1108 by Sanaz Jauregui RN  Outcome: Progressing  Goal: Maintain proper alignment of affected body part  Description  INTERVENTIONS:  - Support, maintain and protect limb and body alignment  - Provide patient/ family with appropriate education  11/6/2019 1356 by Anayeli Kothari RN  Outcome: Completed  11/6/2019 1108 by Anayeli Kothari RN  Outcome: Progressing     Problem: COPING  Goal: Pt/Family able to verbalize concerns and demonstrate effective coping strategies  Description  INTERVENTIONS:  - Assist patient/family to identify coping skills, available support systems and cultural and spiritual values  - Provide emotional support, including active listening and acknowledgement of concerns of patient and caregivers  - Reduce environmental stimuli, as able  - Provide patient education  - Assess for spiritual pain/suffering and initiate spiritual care, including notification of Pastoral Care or bianka based community as needed  - Assess effectiveness of coping strategies  11/6/2019 1356 by Anayeli Kothari RN  Outcome: Completed  11/6/2019 1108 by Anayeli Kothari RN  Outcome: Progressing  Goal: Will report anxiety at manageable levels  Description  INTERVENTIONS:  - Administer medication as ordered  - Teach and encourage coping skills  - Provide emotional support  - Assess patient/family for anxiety and ability to cope  11/6/2019 1356 by Anayeli Kothari RN  Outcome: Completed  11/6/2019 1108 by Anayeli Kothari RN  Outcome: Progressing     Problem: Prexisting or High Potential for Compromised Skin Integrity  Goal: Skin integrity is maintained or improved  Description  INTERVENTIONS:  - Identify patients at risk for skin breakdown  - Assess and monitor skin integrity  - Assess and monitor nutrition and hydration status  - Monitor labs   - Assess for incontinence   - Turn and reposition patient  - Assist with mobility/ambulation  - Relieve pressure over bony prominences  - Avoid friction and shearing  - Provide appropriate hygiene as needed including keeping skin clean and dry  - Evaluate need for skin moisturizer/barrier cream  - Collaborate with interdisciplinary team   - Patient/family teaching  - Consider wound care consult   11/6/2019 1356 by Rajni Burrows RN  Outcome: Completed  11/6/2019 1108 by Rajni Burrows RN  Outcome: Progressing     Problem: Potential for Falls  Goal: Patient will remain free of falls  Description  INTERVENTIONS:  - Assess patient frequently for physical needs  -  Identify cognitive and physical deficits and behaviors that affect risk of falls    -  [place] precautions as indicated by assessment   - Educate patient/family on patient safety including physical limitations  - Instruct patient to call for assistance with activity based on assessment  - Modify environment to reduce risk of injury  - Consider OT/PT consult to assist with strengthening/mobility  11/6/2019 1356 by Rajni Burrows RN  Outcome: Completed  11/6/2019 1108 by Rajni Burrows RN  Outcome: Progressing     Problem: PAIN - ADULT  Goal: Verbalizes/displays adequate comfort level or baseline comfort level  Description  Interventions:  - Encourage patient to monitor pain and request assistance  - Assess pain using appropriate pain scale  - Administer analgesics based on type and severity of pain and evaluate response  - Implement non-pharmacological measures as appropriate and evaluate response  - Consider cultural and social influences on pain and pain management  - Notify physician/advanced practitioner if interventions unsuccessful or patient reports new pain  11/6/2019 1356 by Rajni Burrows RN  Outcome: Completed  11/6/2019 1108 by Rajni Burrows RN  Outcome: Progressing

## 2019-11-06 NOTE — ASSESSMENT & PLAN NOTE
BP controlled  Continue metoprolol 12 5 mg b i d  Demadex, and Zaroxolyn 5 mg tablet daily  Imdur 30mg added 11/1  Caution on adjustments with change in diuretic and imdur with impending d/c home

## 2019-11-06 NOTE — PROGRESS NOTES
Progress Note - Gera Franco 1934, 80 y o  female MRN: [id number]    Unit/Bed#: [facility] 268-02 Encounter: [id number]    Primary Care Provider: Charle Lesch, DO   Date and time admitted to hospital: 10/28/2019  1:22 PM      Wheezing  Assessment & Plan  IMPRESSION:     Continued evidence of pulmonary vascular congestion with small effusions and left basilar subsegmental atelectasis     PICC line tip in the brachiocephalic vein projecting over the aortic arch  Discussed with nursing staff will increase patient's nebulizers to q 6 hours, patient also has as needed albuterol inhaler  Patient needs overnight pulse ox study  Improvement noted with nebulizers  Renal artery stenosis [facility])  Assessment & Plan  Due for repeat renal u/s  Was unable to be completed during hospitalization secondary to patient discomfort  Can be coordinated as OP    Type 2 MI (myocardial infarction) [facility])  Assessment & Plan  Secondary to demand ischemia due to acute on chronic CHF exacerbation  Imdur started    Controlled type 2 diabetes mellitus with diabetic neuropathy, without long-term current use of insulin ([facility] [address] )  Assessment & Plan  Lab Results   Component Value Date    HGBA1C 5 0 08/16/2019       No results for input(s): POCGLU in the last 72 hours  Blood Sugar Average: Last 72 hrs:  Blood sugars were well controlled during her hospitalization  Her last A1c was 5 0  She did not require any insulin during hospitalization  Recommend diabetic diet      Hyperlipidemia  Assessment & Plan  No statin secondary to significant myalgias  Ideally patient should be on a statin given her hx  Last lipid panel 10/23/2019: Total cholesterol 144  Triglycerides 223  HDL 33  LDL 66  AZUL (obstructive sleep apnea)  Assessment & Plan  No CPAP at home  Last sleep study was approx 5 years ago  Did not wear home CPAP  Was wearing during hospitalization  Patient had an overnight pulse oximetry on 10/28 on 2 L nasal cannula  Patient's pulse oximetry was 90-99% 86 6% of the time and 100% 13 4% of the time  Patient had overnight pulse ox performed,  Less then 89% on room air  Patient does not qualify for home ox, will repeat tonight  Patient is also being followed by pulmonology now and will need to follow up outpatient for sleep study for home CPAP  Reviewed pulm consult  pt had ambulatory Pox on RA  sat 91%    Chronic anemia  Assessment & Plan  Her hemoglobin did drop from 8 1-->7 6  Patient was experiencing some mild shortness of breath and transfused for symptomatic anemia  Patient is status post transfusion 10/30 she received 1 unit of PRBCs     Hgb 11/4 8 9  Continue to transfuse with hemoglobin < 7 0  Acute-on-chronic kidney injury [facility])  Assessment & Plan  Reviewed Nephrology note  Patient with a baseline creatinine of 1 5-1 6  Patient restarted on Torsemide  Zaroxolyn, Lasix discontinued  She does need to have been repeat renal artery ultrasound secondary to history of renal artery stenosis  It was attempted to be completed during his recent hospitalization but was unable to be secondary to discomfort  Creatinine is continuing to improve  1 95--> 2 08 --> 1 99 --> 1 84 --> 1 76 --> 1 58--> 1 69-->1 66-->1 68    Patients potassium 4 0 will continue to monitor, may need potassium supplementation  Patient will follow up with Nephrology outpatient the         Hyponatremia  Assessment & Plan  Sodium is stable at this time  Mild decrease and asymptomatic  Continue with fluid restrictions  NA-->134    History of aortic valve replacement  Assessment & Plan  Bioprosthetic valve  Last echo was 10/21/2019  Showing moderate left ventricle systolic dysfunction with an EF 41%  Severe hypokinesis to akinesis in the distal half of the anterior, lateral and inferior walls with akinesis of the distal 3rd of the septum  The apex is akinetic to paradoxical  The base of the heart contracts vigorously  Although these findings could be secondary to coronary artery disease, they also are consistent with Takotsubo's syndrome  Grade 2 left ventricle diastolic dysfunction  Moderate to severe MR  Calcified tricuspid aortic valve  Moderate TR  Mild CO    Depression  Assessment & Plan  Stable  Continue sertraline 25 mg daily    Acute on chronic diastolic heart failure (HCC)  Assessment & Plan  Wt Readings from Last 3 Encounters:   11/06/19 83 8 kg (184 lb 12 8 oz)   10/28/19 76 kg (167 lb 8 8 oz)   10/27/19 81 9 kg (180 lb 8 9 oz)     Patient with acute on chronic diastolic CHF  She did require BiPAP, IV diuresis during her ICU stay        Recommend daily weights with the same scale  Weights have been on different scales and therefore unreliable - discussed with nursing to have same scale daily        Pt does not appear fluid overload  Weights relatively stable and last 3 weights have been on same scale  Continue fluid restrictions  Trace bilateral lower extremity edema  Discussed with patient continue compression stockings and elevation of lower extremities  Patient was seen by Nephrology today  Nephrology restart patient's Demadex and Zaroxolyn and discontinue Lasix      IS given by therapy today and encouraged use     BNP 19619          Essential hypertension  Assessment & Plan  BP controlled  Continue metoprolol 12 5 mg b i d  Demadex, and Zaroxolyn 5 mg tablet daily  Imdur 30mg added 11/1  Caution on adjustments with change in diuretic and imdur with impending d/c home  H/O: CVA (cerebrovascular accident)  Assessment & Plan  Continue with Plavix    Hypothyroidism  Assessment & Plan   Continue levothyroxine 112 mcg daily  TSH within normal limits 10/21/2019    * SDH (subdural hematoma) [facility])  Assessment & Plan  Secondary to a fall 10/8  Patient was admitted to [facility] 10/8 through 10/10  She was evaluated by Neurosurgery who recommended conservative management of SDH      VTE Pharmacologic Prophylaxis:   Pharmacologic: Heparin Drip  Mechanical VTE Prophylaxis in Place: Yes    Current Length of Stay: 9 day(s)    Current Patient Status: Inpatient Rehab     Discharge Plan: As per treatment team     Code Status: Level 1 - Full Code    Subjective:   Patient seen today seated in wheelchair,  Overall patient states she is feeling much better in regards to shortness of breath as well as wheezing  Patient does state she get  Shortness of breath with exertion  Patient currently denies chest pain palpitations dizziness and lightheadedness  Patient denies abdominal pain, nausea, vomiting, no difficulties with urination  Patient is currently maintaining fluid restrictions  Nursing staff does state the patient appears to be short of breath  Patient was seen by Nephrology today  Patient will be discharged today  Objective:     Vitals:   Temp (24hrs), Av 1 °F (36 7 °C), Min:97 °F (36 1 °C), Max:98 7 °F (37 1 °C)    Temp:  [97 °F (36 1 °C)-98 7 °F (37 1 °C)] 97 °F (36 1 °C)  HR:  [60-63] 63  Resp:  [20-21] 21  BP: (102-138)/(52-71) 124/57  SpO2:  [93 %-98 %] 94 %  Body mass index is 37 33 kg/m²  Review of Systems   Constitutional: Negative for appetite change, chills, fatigue, fever and unexpected weight change  Respiratory: Negative for cough, chest tightness, shortness of breath and wheezing  Cardiovascular: Positive for leg swelling (stable  denies calf pain)  Negative for chest pain and palpitations  Gastrointestinal: Negative for abdominal pain, constipation, diarrhea, nausea and vomiting  Genitourinary: Negative for difficulty urinating and dysuria  Musculoskeletal: Positive for arthralgias (posterior shoulder) and back pain  Negative for myalgias and neck stiffness  Neurological: Negative for dizziness, syncope, light-headedness and headaches         + restless leg   Psychiatric/Behavioral: The patient is nervous/anxious           Input and Output Summary (last 24 hours): Intake/Output Summary (Last 24 hours) at 11/6/2019 1128  Last data filed at 11/6/2019 0852  Gross per 24 hour   Intake 740 ml   Output 950 ml   Net -210 ml       Physical Exam:     Physical Exam   Constitutional: She is oriented to person, place, and time  She appears well-developed and well-nourished  No distress  Obese   Neck: No JVD present  Cardiovascular: Normal rate, regular rhythm and normal heart sounds  No murmur heard  Trace B/L LE edema  Compression socks in place   Pulmonary/Chest: Effort normal  No respiratory distress  She has wheezes (diffuse expiratory)  She has no rales  Neurological: She is alert and oriented to person, place, and time  No focal deficits   Skin: Skin is warm and dry  Psychiatric: She has a normal mood and affect  Her behavior is normal  Judgment and thought content normal    Nursing note and vitals reviewed        Additional Data:     Labs:    Results from last 7 days   Lab Units 11/04/19  0429   WBC Thousand/uL 6 50   HEMOGLOBIN g/dL 8 9*   HEMATOCRIT % 27 0*   PLATELETS Thousands/uL 233   NEUTROS PCT % 68*   LYMPHS PCT % 23*   MONOS PCT % 7   EOS PCT % 2     Results from last 7 days   Lab Units 11/06/19  0512   SODIUM mmol/L 131*   POTASSIUM mmol/L 4 0   CHLORIDE mmol/L 88*   CO2 mmol/L 33*   BUN mg/dL 74*   CREATININE mg/dL 1 68*   ANION GAP mmol/L 10   CALCIUM mg/dL 9 0   GLUCOSE RANDOM mg/dL 116*                       Labs reviewed    Imaging:    Imaging reviewed    Recent Cultures (last 7 days):           Last 24 Hours Medication List:     Current Facility-Administered Medications:  acetaminophen 650 mg Oral Q6H PRN Anton Shrestha MD   albuterol 2 puff Inhalation Q4H PRN Anton Shrestha MD   albuterol 2 5 mg Nebulization TID Jordan Baird MD   clopidogrel 75 mg Oral Daily Anton Shrestha MD   gabapentin 300 mg Oral TID Anton Shrestha MD   heparin (porcine) 5,000 Units Subcutaneous Q8H [address] Anton Shrestha MD   isosorbide mononitrate 30 mg Oral Daily NATHEN Escamilla   levothyroxine 112 mcg Oral Daily Anton Shrestha MD   lidocaine 3 patch Topical Daily PRN Anton Taveras MD   menthol-methyl salicylate  Apply externally 4x Daily PRN Anton Shrestha MD   methocarbamol 500 mg Oral Q6H PRN Anton Shrestha MD   metoprolol tartrate 25 mg Oral Q12H [address] Kidd MD   nystatin  Topical BID Anotn Shrestha MD   pantoprazole 20 mg Oral Daily Before Breakfast Gisselle Zhang MD   polyethylene glycol 17 g Oral Daily PRN Gisell Sawyer MD   sertraline 25 mg Oral HS Anton Shrestha MD   torsemide 40 mg Oral BID Cindy Sun MD   trolamine salicylate 1 application Topical 4x Daily NATHEN Guerra        M*Modal software was used to dictate this note  It may contain errors with dictating incorrect words or incorrect spelling  Please contact the provider directly with any questions

## 2019-11-06 NOTE — NURSING NOTE
Pt discharged home with daughter  Daughter took pt personal belongings with her  Transported pt via wheelchair to be transported home by daughter

## 2019-11-06 NOTE — PROGRESS NOTES
Reviewed overnight pulse O2 on RA  Patient qualifes for Home O2  Will place order for home O2  CM made aware       Dave Ramos MD MPH  Physical Medicine and Rehabilitation

## 2019-11-06 NOTE — PROGRESS NOTES
Follow up Consultation    Nephrology   Agata Lu 80 y o  female MRN: [id number]  Unit/Bed#: [facility] 268-02 Encounter: [id number]      Physician Requesting Consult: Gisell Sawyer MD  Reason for Consult:  Acute kidney injury       ASSESSMENT/PLAN:    1)Acute kidney injury (POA) on CKD stage 3:  - HERLINDA most likely secondary to ischemic injury from hemodynamic perturbations plus renal venous congestion  - After review of records it appears that the patient has a baseline Creatinine of 1 5-1 6 mg/dL  - patient's creatinine today is at 1 68 mg/dL  Creatinine stable and back to baseline  - Avoid nephrotoxins, adjust meds to appropriate GFR   - Acid base and lytes stable except mild hyponatremia and alkalosis  - clinically patient appears to be minimally hypervolemic  -continue torsemide 40 mg p o  B i d  Advised patient to take metolazone 5 mg p o  Only gains more than 2 lb in 2 days  - concern for needing imaging for renal artery stenosis  Patient has a history of a stent on 1 side  At this time she is not able to get vascular Dopplers imaging, MRA was ordered however discussed with the patient the risks for NSF in light of acute kidney injury and that performing the test will not change her current management  She is agreeable and does not wish to go ahead with MRA   - Clinically patient is not uremic and there is no acute indication for renal replacement therapy (dialysis)  - Optimize hemodynamic status to avoid delay in renal recovery  - check BMP, magnesium, phosphorus in a m   - Await renal recovery  - Place on a renal diet when allowed diet order    - Strict I/O   - stable from renal standpoint for Discharge today have sent the office a message to arrange for outpt follow up in a week with labs    2)Blood pressure/hypertension:  - Optimize hemodynamics   - Maintain MAP > 65mmHg  - Avoid BP fluctuations  - on metoprolol 25 mg p o  B i d  Imdur 30 mg p o   Q day    3)H/H/anemia:  - most recent hemoglobin at 8 9 grams/deciliter  - maintain hemoglobin greater than 8 grams/deciliter    4)Volume status:  - Clinically patient appears to be euvolemic to minimally hypervolemic  - continue torsemide 40 mg p o  B i d  And change metolazone to 5 mg p o  P r n  If patient gains more than 2 lb in 2 days  5)Hyponatremia:  - Likely due to decreased free water clearance  - Likely due to decreased distal sodium delivery  - Continue to monitor for now, most recent sodium 131 mEq     6)CKD:  - Most likely secondary to renal vascular disease plus age-related nephron loss  - will arrange for follow up post hospitalization   - follows with Dr Josias Diaz for nephrology as an outpatient    7) Subdural hematoma:  - management as per Primary team    8)CHF:  - Management as per primary team  - most recent EF of 41%  - follow-up with cardiology    Thanks for the consult  Will continue to follow  Please call with questions/ concerns  Above-mentioned orders and Plan with regards to acute kidney injury was discussed with the team in [address] MD, [facility], 2019, 7:51 AM              Objective :   Patient seen and examined in her room no overnight events hemodynamically stable remains afebrile, urine output close to 1 4 L in last 24 hours last bladder scan 97 cc feels great , no complains  PHYSICAL EXAM  /63   Pulse 60   Temp 98 7 °F (37 1 °C) (Tympanic)   Resp 20   Ht 4' 11" (1 499 m)   Wt 83 8 kg (184 lb 12 8 oz)   LMP  (LMP Unknown)   SpO2 96%   BMI 37 33 kg/m²   Temp (24hrs), Av 3 °F (36 8 °C), Min:97 7 °F (36 5 °C), Max:98 7 °F (37 1 °C)        Intake/Output Summary (Last 24 hours) at 2019 0751  Last data filed at 2019 0624  Gross per 24 hour   Intake 860 ml   Output 950 ml   Net -90 ml       I/O last 24 hours: In: 18 [P O :840;  I V :20]  Out: 1350 [Urine:1350]      Current Weight: Weight - Scale: 83 8 kg (184 lb 12 8 oz)  First Weight: Weight - Scale: 83 9 kg (184 lb 15 5 oz)  Physical Exam Constitutional: She is oriented to person, place, and time  She appears well-developed and well-nourished  No distress  HENT:   Head: Normocephalic and atraumatic  Mouth/Throat: Oropharynx is clear and moist  No oropharyngeal exudate  Eyes: Conjunctivae are normal  No scleral icterus  Neck: Neck supple  No JVD present  Cardiovascular: Normal heart sounds  Exam reveals no friction rub  Pulmonary/Chest: Effort normal  She has no wheezes  She has no rales  Abdominal: Soft  She exhibits no mass  Musculoskeletal: She exhibits no edema  Neurological: She is alert and oriented to person, place, and time  Skin: Skin is warm  No rash noted  She is not diaphoretic  Psychiatric: She has a normal mood and affect  Her behavior is normal    Nursing note and vitals reviewed  Review of Systems   Constitutional: Negative for chills, fatigue and fever  HENT: Negative for congestion  Cardiovascular: Negative for chest pain, palpitations and leg swelling  Gastrointestinal: Negative for abdominal pain, diarrhea, nausea and vomiting  Genitourinary: Negative for dysuria  Musculoskeletal: Negative for back pain  Neurological: Negative for dizziness and headaches  Psychiatric/Behavioral: Negative for confusion  All other systems reviewed and are negative        Scheduled Meds:    Current Facility-Administered Medications:  acetaminophen 650 mg Oral Q6H PRN Anton Shrestha MD   albuterol 2 puff Inhalation Q4H PRN Anton Shrestha MD   albuterol 2 5 mg Nebulization TID Jordan Baird MD   clopidogrel 75 mg Oral Daily Anton Shrestha MD   gabapentin 300 mg Oral TID Anton Shrestha MD   heparin (porcine) 5,000 Units Subcutaneous Q8H [facility] Anton Shrestha MD   isosorbide mononitrate 30 mg Oral Daily NATHEN Escamilla   levothyroxine 112 mcg Oral Daily Anton Shrestha MD   lidocaine 3 patch Topical Daily PRN Jordan Baird MD   menthol-methyl salicylate  Apply externally 4x Daily PRN Shaun Alexander MD   methocarbamol 500 mg Oral Q6H PRN Shaun Alexander MD   metolazone 5 mg Oral Q48H Katia Galaviz MD   metoprolol tartrate 25 mg Oral Q12H [address] Kidd MD   nystatin  Topical BID Shaun Alexander MD   pantoprazole 20 mg Oral Daily Before Breakfast Esther Arevalo MD   polyethylene glycol 17 g Oral Daily PRN Anton Shrestha MD   sertraline 25 mg Oral HS Anton Shrestha MD   torsemide 40 mg Oral BID Katia Galaviz MD   trolamine salicylate 1 application Topical 4x Daily NATHEN Escamilla       PRN Meds:   acetaminophen    albuterol    lidocaine    menthol-methyl salicylate    methocarbamol    polyethylene glycol    Continuous Infusions:       Invasive Devices: Invasive Devices     Peripherally Inserted Central Catheter Line            PICC Line 10/22/19 Left Brachial 14 days                  LABORATORY:    Results from last 7 days   Lab Units 11/06/19  0512 11/05/19  8250 11/04/19  0429 11/03/19  0538 11/02/19  0553 11/01/19  0729 10/31/19  1300 10/31/19  0441   WBC Thousand/uL  --   --  6 50  --   --   --  5 90  --    HEMOGLOBIN g/dL  --   --  8 9*  --   --   --  9 3*  --    HEMATOCRIT %  --   --  27 0*  --   --   --  27 4*  --    PLATELETS Thousands/uL  --   --  233  --   --   --  205  --    POTASSIUM mmol/L 4 0 3 9 4 4 4 4 4 1 4 1  --  3 9   CHLORIDE mmol/L 88* 89* 90* 90* 91* 91*  --  90*   CO2 mmol/L 33* 33* 32* 35* 35* 34*  --  35*   BUN mg/dL 74* 73* 69* 70* 68* 68*  --  68*   CREATININE mg/dL 1 68* 1 61* 1 66* 1 69* 1 58* 1 76*  --  1 84*   CALCIUM mg/dL 9 0 9 3 9 4 9 1 9 2 9 5  --  9 0      rest all reviewed    RADIOLOGY:  XR chest pa & lateral   Final Result by Mike Kerr MD (11/03 1932)      Continued evidence of pulmonary vascular congestion with small effusions and left basilar subsegmental atelectasis      PICC line tip in the brachiocephalic vein projecting over the aortic arch         The examination demonstrates a significant  finding and was documented as such in Epic for liaison and referring practitioner notification  Workstation performed: [id number]           Rest all reviewed    Portions of the record may have been created with voice recognition software  Occasional wrong word or "sound a like" substitutions may have occurred due to the inherent limitations of voice recognition software  Read the chart carefully and recognize, using context, where substitutions have occurred  If you have any questions, please contact the dictating provider

## 2019-11-06 NOTE — OCCUPATIONAL THERAPY NOTE
On this day pt requesting to purchase leg  ($14 [phone]) 2* to pt reporting she uses cane at home and that she prefers to use equipment  Therapist discussed with pt process of purchasing leg  where pt would have to sign consignment form and bill would be mailed to home  Pt verbalized understanding, leg  provided with copy of signed paperwork  1 copy kept for therapy department

## 2019-11-06 NOTE — CASE MANAGEMENT
Team Discharge Summary  Pt made good progress, and is returning home with family support and cont'd service  Pts [place] services through [facility] will resume for RN/PT/OT  Pts medications were sent to her home pharmacy for p/u after d/c   Pt did not obtain any DME during this stay  Pts family was present for d/c instructions, and aware of her functional ability

## 2019-11-06 NOTE — PLAN OF CARE
Problem: CARDIOVASCULAR - ADULT  Goal: Maintains optimal cardiac output and hemodynamic stability  Description  INTERVENTIONS:  - Monitor I/O, vital signs and rhythm  - Monitor for S/S and trends of decreased cardiac output  - Administer and titrate ordered vasoactive medications to optimize hemodynamic stability  - Assess quality of pulses, skin color and temperature  - Assess for signs of decreased coronary artery perfusion  - Instruct patient to report change in severity of symptoms  Outcome: Progressing  Goal: Absence of cardiac dysrhythmias or at baseline rhythm  Description  INTERVENTIONS:  - Continuous cardiac monitoring, vital signs, obtain 12 lead EKG if ordered  - Administer antiarrhythmic and heart rate control medications as ordered  - Monitor electrolytes and administer replacement therapy as ordered  Outcome: Progressing     Problem: RESPIRATORY - ADULT  Goal: Achieves optimal ventilation and oxygenation  Description  INTERVENTIONS:  - Assess for changes in respiratory status  - Assess for changes in mentation and behavior  - Position to facilitate oxygenation and minimize respiratory effort  - Oxygen administered by appropriate delivery if ordered  - Initiate smoking cessation education as indicated  - Encourage broncho-pulmonary hygiene including cough, deep breathe, Incentive Spirometry  - Assess the need for suctioning and aspirate as needed  - Assess and instruct to report SOB or any respiratory difficulty  - Respiratory Therapy support as indicated  Outcome: Progressing     Problem: GASTROINTESTINAL - ADULT  Goal: Maintains adequate nutritional intake  Description  INTERVENTIONS:  - Monitor percentage of each meal consumed  - Identify factors contributing to decreased intake, treat as appropriate  - Assist with meals as needed  - Monitor I&O, weight, and lab values if indicated  - Obtain nutrition services referral as needed  Outcome: Progressing     Problem: METABOLIC, FLUID AND ELECTROLYTES - ADULT  Goal: Electrolytes maintained within normal limits  Description  INTERVENTIONS:  - Monitor labs and assess patient for signs and symptoms of electrolyte imbalances  - Administer electrolyte replacement as ordered  - Monitor response to electrolyte replacements, including repeat lab results as appropriate  - Instruct patient on fluid and nutrition as appropriate  Outcome: Progressing  Goal: Fluid balance maintained  Description  INTERVENTIONS:  - Monitor labs   - Monitor I/O and WT  - Instruct patient on fluid and nutrition as appropriate  - Assess for signs & symptoms of volume excess or deficit  Outcome: Progressing  Goal: Glucose maintained within target range  Description  INTERVENTIONS:  - Monitor Blood Glucose as ordered  - Assess for signs and symptoms of hyperglycemia and hypoglycemia  - Administer ordered medications to maintain glucose within target range  - Assess nutritional intake and initiate nutrition service referral as needed  Outcome: Progressing     Problem: SKIN/TISSUE INTEGRITY - ADULT  Goal: Skin integrity remains intact  Description  INTERVENTIONS  - Identify patients at risk for skin breakdown  - Assess and monitor skin integrity  - Assess and monitor nutrition and hydration status  - Monitor labs (i e  albumin)  - Assess for incontinence   - Turn and reposition patient  - Assist with mobility/ambulation  - Relieve pressure over bony prominences  - Avoid friction and shearing  - Provide appropriate hygiene as needed including keeping skin clean and dry  - Evaluate need for skin moisturizer/barrier cream  - Collaborate with interdisciplinary team (i e  Nutrition, Rehabilitation, etc )   - Patient/family teaching  Outcome: Progressing  Goal: Oral mucous membranes remain intact  Description  INTERVENTIONS  - Assess oral mucosa and hygiene practices  - Implement preventative oral hygiene regimen  - Implement oral medicated treatments as ordered  - Initiate Nutrition services referral as needed  Outcome: Progressing     Problem: MUSCULOSKELETAL - ADULT  Goal: Maintain or return mobility to safest level of function  Description  INTERVENTIONS:  - Assess patient's ability to carry out ADLs; assess patient's baseline for ADL function and identify physical deficits which impact ability to perform ADLs (bathing, care of mouth/teeth, toileting, grooming, dressing, etc )  - Assess/evaluate cause of self-care deficits   - Assess range of motion  - Assess patient's mobility  - Assess patient's need for assistive devices and provide as appropriate  - Encourage maximum independence but intervene and supervise when necessary  - Involve family in performance of ADLs  - Assess for home care needs following discharge   - Consider OT consult to assist with ADL evaluation and planning for discharge  - Provide patient education as appropriate  Outcome: Progressing  Goal: Maintain proper alignment of affected body part  Description  INTERVENTIONS:  - Support, maintain and protect limb and body alignment  - Provide patient/ family with appropriate education  Outcome: Progressing     Problem: COPING  Goal: Pt/Family able to verbalize concerns and demonstrate effective coping strategies  Description  INTERVENTIONS:  - Assist patient/family to identify coping skills, available support systems and cultural and spiritual values  - Provide emotional support, including active listening and acknowledgement of concerns of patient and caregivers  - Reduce environmental stimuli, as able  - Provide patient education  - Assess for spiritual pain/suffering and initiate spiritual care, including notification of Pastoral Care or bianka based community as needed  - Assess effectiveness of coping strategies  Outcome: Progressing  Goal: Will report anxiety at manageable levels  Description  INTERVENTIONS:  - Administer medication as ordered  - Teach and encourage coping skills  - Provide emotional support  - Assess patient/family for anxiety and ability to cope  Outcome: Progressing     Problem: Prexisting or High Potential for Compromised Skin Integrity  Goal: Skin integrity is maintained or improved  Description  INTERVENTIONS:  - Identify patients at risk for skin breakdown  - Assess and monitor skin integrity  - Assess and monitor nutrition and hydration status  - Monitor labs   - Assess for incontinence   - Turn and reposition patient  - Assist with mobility/ambulation  - Relieve pressure over bony prominences  - Avoid friction and shearing  - Provide appropriate hygiene as needed including keeping skin clean and dry  - Evaluate need for skin moisturizer/barrier cream  - Collaborate with interdisciplinary team   - Patient/family teaching  - Consider wound care consult   Outcome: Progressing     Problem: Potential for Falls  Goal: Patient will remain free of falls  Description  INTERVENTIONS:  - Assess patient frequently for physical needs  -  Identify cognitive and physical deficits and behaviors that affect risk of falls    -  [place] fall precautions as indicated by assessment   - Educate patient/family on patient safety including physical limitations  - Instruct patient to call for assistance with activity based on assessment  - Modify environment to reduce risk of injury  - Consider OT/PT consult to assist with strengthening/mobility  Outcome: Progressing     Problem: PAIN - ADULT  Goal: Verbalizes/displays adequate comfort level or baseline comfort level  Description  Interventions:  - Encourage patient to monitor pain and request assistance  - Assess pain using appropriate pain scale  - Administer analgesics based on type and severity of pain and evaluate response  - Implement non-pharmacological measures as appropriate and evaluate response  - Consider cultural and social influences on pain and pain management  - Notify physician/advanced practitioner if interventions unsuccessful or patient reports new pain  Outcome: Progressing

## 2019-11-06 NOTE — ASSESSMENT & PLAN NOTE
Secondary to a fall 10/8  Patient was admitted to [facility] 10/8 through 10/10  She was evaluated by Neurosurgery who recommended conservative management of SDH

## 2019-11-06 NOTE — NURSING NOTE
Pt had sleep study last night, no c/o pain, no c/o SOB, slept good  Urinated 700 cc this shift, latest bladder scan @630 am for 97 ml  Will continue to monitor

## 2019-11-06 NOTE — NURSING NOTE
Pt had complaints of shortness of breath  [place]asta wheezing on auscultation  Gave albuterol inhaler to relieve symptoms  Pt stated inhaler was beginning to resolve symptoms  Will continue to monitor

## 2019-11-07 ENCOUNTER — TELEPHONE (OUTPATIENT)
Dept: NEPHROLOGY | Facility: CLINIC | Age: 84
End: 2019-11-07

## 2019-11-07 ENCOUNTER — DOCUMENTATION (OUTPATIENT)
Dept: OTHER | Facility: HOSPITAL | Age: 84
End: 2019-11-07

## 2019-11-07 DIAGNOSIS — R06.2 WHEEZING: ICD-10-CM

## 2019-11-07 DIAGNOSIS — G47.33 OSA (OBSTRUCTIVE SLEEP APNEA): Chronic | ICD-10-CM

## 2019-11-07 DIAGNOSIS — I50.33 ACUTE ON CHRONIC DIASTOLIC HEART FAILURE (HCC): ICD-10-CM

## 2019-11-07 NOTE — TELEPHONE ENCOUNTER
----- Message from Vannesa Summers MD sent at 11/5/2019  9:15 AM EST -----  Regarding: HERLINDA  Please schedule the patient for hospital discharge follow-up for acute kidney injury  Please send the patient orders for BMP, magnesium, phosphorus prior to the visit    Manjit Peacock in the past    Thanks  casper

## 2019-11-08 NOTE — OCCUPATIONAL THERAPY NOTE
OT DISCHARGE SUMMARY    Pt was discharged home with family support  Pt currently functioning at supervision level for ADL,requires A for socks and shoes only which family will be able to provide A at home, pt currently progressed to supervision level for transfers with RW  No DME ordered at this [facility]  However, therapist provided pt with handout for dressing stick and pt purchased leg  with receipt provided to pt who will be billed for equipment at a later date  Pt to receive home health OT services  Pt lives at home with family who are able to assist as needed  Therapist recommended supervision at home 2* to multiple falls, daughter verbalized understanding and reported both family and Spiritism members able to provide supervision when daughter is working

## 2019-11-08 NOTE — PHYSICAL THERAPY NOTE
PT D/C SUMMARY    Pt progressed to S level with use of RW  Cont to recommend S level at home for overall safety and to dec fall risk  Pt's family was present at times during therapy sessions to see pt progress and to review safety precautions  Pt has modifications at home, such as that she sleeps in a recliner  Her family also has a transport chair that they will use to help her access Dr 's appointments and community events  Pt was able to make progress in regards to overall strength, activity tolerance, balance and safety with functional mobility  Pt's family is supportive, and they will be assisting pt at home along with home health aides  Pt able to dc home with support and cont home health services at this time

## 2019-11-11 ENCOUNTER — DOCUMENTATION (OUTPATIENT)
Dept: OTHER | Facility: HOSPITAL | Age: 84
End: 2019-11-11

## 2019-11-11 DIAGNOSIS — N17.9 AKI (ACUTE KIDNEY INJURY) (HCC): Primary | ICD-10-CM

## 2019-11-11 DIAGNOSIS — N18.30 STAGE 3 CHRONIC KIDNEY DISEASE (HCC): ICD-10-CM

## 2019-11-11 NOTE — TELEPHONE ENCOUNTER
Patients daughter called to schedule hospital follow up for 12/02/19 at 1:00 pm with Octaviano Mendes at the [facility] office  Made her aware of labs that need to be done a week prior and she stated she will take her to [place] to have them done  I will have clinical put in the lab orders

## 2019-11-26 ENCOUNTER — TELEPHONE (OUTPATIENT)
Dept: NEPHROLOGY | Facility: HOSPITAL | Age: 84
End: 2019-11-26

## 2019-11-26 ENCOUNTER — DOCUMENTATION (OUTPATIENT)
Dept: NEPHROLOGY | Facility: HOSPITAL | Age: 84
End: 2019-11-26

## 2019-11-26 LAB
CRP SERPL QL: 15.5 MG/L
DSDNA AB SER-ACNC: NEGATIVE [IU]/ML
ERYTHROCYTE [SEDIMENTATION RATE] IN BLOOD BY WESTERGREN METHOD: 53 MM/H
EXT DIFF-ABS BASOPHILS: 0
EXT DIFF-ABS EOSINOPHILS: 0.1
EXT DIFF-ABS LYMPHOCYTES: 1.4
EXT DIFF-ABS MONOCYTES: 0.4
EXT DIFF-ABS NEUTROPHILS: 3.5
EXT GLUCOSE BLD: 120
EXTERNAL ALBUMIN: 3.7
EXTERNAL ALK PHOS: 89
EXTERNAL ALT: 18
EXTERNAL ANION GAP: 8
EXTERNAL AST: 24
EXTERNAL BICARBONATE: 34
EXTERNAL BUN: 78
EXTERNAL CALCIUM: 9.2
EXTERNAL CHLORIDE: 95
EXTERNAL CREATININE: 1.88
EXTERNAL EGFR: 24
EXTERNAL HEMATOCRIT: 31 %
EXTERNAL HEMOGLOBIN: 10.6 G/DL
EXTERNAL MCV: 100
EXTERNAL PLATELET COUNT: 211 K/ΜL
EXTERNAL POTASSIUM: 4
EXTERNAL RBC: 3.13
EXTERNAL RDW: 16.9
EXTERNAL SODIUM: 137
EXTERNAL T.BILIRUBIN: 0.7
EXTERNAL TOTAL PROTEIN: 7.2
EXTERNAL WBC: 5.4

## 2019-11-26 NOTE — TELEPHONE ENCOUNTER
I spoke to patient's daughter Juwan Kruse, and she is aware of the patient's appointment on 12/2 with Jeffery Mckee  Patient had blood work through [facility] and I will try and get those results  Labs were done through [facility], I called and results are being faxed

## 2019-11-27 ENCOUNTER — HOSPITAL ENCOUNTER (OUTPATIENT)
Dept: RADIOLOGY | Age: 84
Discharge: HOME | End: 2019-11-27
Payer: COMMERCIAL

## 2019-11-27 DIAGNOSIS — S06.5X9A SUBDURAL HEMATOMA, ACUTE (HCC): ICD-10-CM

## 2019-11-27 PROCEDURE — 70450 CT HEAD/BRAIN W/O DYE: CPT

## 2019-12-02 ENCOUNTER — TELEPHONE (OUTPATIENT)
Dept: NEPHROLOGY | Facility: CLINIC | Age: 84
End: 2019-12-02

## 2019-12-02 DIAGNOSIS — N18.30 STAGE 3 CHRONIC KIDNEY DISEASE (HCC): Primary | ICD-10-CM

## 2019-12-02 NOTE — TELEPHONE ENCOUNTER
Spoke with Antonina Bonner, daughter, concerning recent lab work on 11/16/2019 and possible rescheduling appointment today due to snow    -Concerns discussed:   -creatinine on 11/16/2019-1 88 from discharge creatinine 1 68  -Medications: Torsemide 40 mg BID- due to increase in urination and stable weight, patient currently taking 40 mg am and 20 mg PM for some time-has not needed to take PRN Metolazone   -Can get itchy at times as well as metallic taste-not constant  -Continues on fluid restriction 1200 ml/day, and inquired about increasing  -has some sores in mouth sores     Recommendations:  -Get BMP, phos and mag at earliest convenience-patient has [facility] weekly-will send orders to get next 24/48 hours-daughter agreed    Once reviewed further recommendations will follow  -Okay to increase to 1500 ml/day fluid restriction-if gain weight -decrease back to 1200 ml/day-daughter agreed  -Can use mouth Kote- biotene mouth wash for comfort and may need to see PCP or even dentist with denture issues-daughter agreed  -Okay to reschedule appointment in 1-2 weeks-daughter agreed  -instructed to call with questions or concerns-daughter agreed

## 2019-12-02 NOTE — TELEPHONE ENCOUNTER
Patient has an appointment today but Nancy Iniguez will like a call back regarding her labs to see if she can wait to be seen  Please contact joshua at [phone]

## 2019-12-02 NOTE — TELEPHONE ENCOUNTER
----- Message from Keily Simms, [address] sent at 12/2/2019 12:52 PM EST -----  Hi  Orders are in for BMP  Mag, and Phos  Please send to [facility] to assist with blood draw this week    Requesting next 24-48 hours  Thanks  Daughter aware as well  Jonnie Conteh

## 2019-12-03 ENCOUNTER — OFFICE VISIT (OUTPATIENT)
Dept: NEUROSURGERY | Facility: CLINIC | Age: 84
End: 2019-12-03
Payer: COMMERCIAL

## 2019-12-03 VITALS
BODY MASS INDEX: 36.81 KG/M2 | SYSTOLIC BLOOD PRESSURE: 130 MMHG | TEMPERATURE: 98.7 F | WEIGHT: 182.6 LBS | HEART RATE: 74 BPM | DIASTOLIC BLOOD PRESSURE: 78 MMHG | HEIGHT: 59 IN

## 2019-12-03 DIAGNOSIS — S06.5X9A SDH (SUBDURAL HEMATOMA) (HCC): Primary | ICD-10-CM

## 2019-12-03 DIAGNOSIS — Z98.1 S/P CERVICAL SPINAL FUSION: ICD-10-CM

## 2019-12-03 DIAGNOSIS — M62.838 NECK MUSCLE SPASM: ICD-10-CM

## 2019-12-03 PROCEDURE — 99213 OFFICE O/P EST LOW 20 MIN: CPT | Performed by: PHYSICIAN ASSISTANT

## 2019-12-03 NOTE — PROGRESS NOTES
Neurosurgery Office Note  Williams Castellon 80 y o  female MRN: [id number]      Assessment/Plan     SDH (subdural hematoma) [facility])  Trace left anterior falx SDH after fall at [address] on 10/9/19  · H/o C1-2 lateral mass fusion in May 2019; imaging after fall showed stable fusion with intact hardware  · Patient was on Plavix and asked to hold for 2 weeks  · Recently hospitalized for acute on chronic kidney failure and pulmonary edema, now discharged from rehab    Imaging:  · CT head w/o, 11/27/19: complete resolution of previously seen SDH    Plan:  · Ok to resume all AC/AP  · Return as needed       Diagnoses and all orders for this visit:    SDH (subdural hematoma) ([facility] [address] )    S/P cervical spinal fusion  -     Ambulatory referral to Physical Therapy; Future    Neck muscle spasm  -     Ambulatory referral to Physical Therapy; Future            CHIEF COMPLAINT    Chief Complaint   Patient presents with    Follow-up     Subdural hematoma        HISTORY    This is an 80-year-old female with past medical history of multiple CVAs on Plavix, hypertension, hyperlipidemia, CHF, CKD, and history of bilateral breast cancer who presented as a trauma transfer from [facility] on 10/09/2019  She states that she was on her way into the building for a follow-up appointment with her oncologist when the automated doors closed on her walker and she fell backwards, striking her head  She states that she did not lose consciousness  At the time she denied any weakness, numbness, or tingling of her bilateral extremities but did complain of a slight headache  CT of her head performed at hospital showed a trace subdural hematoma along her left falx  She was recommended to hold Plavix and all other blood thinning medications for 2 weeks and follow up in 2 weeks with a repeat CT head    Unfortunately, she developed acute respiratory failure and acute on chronic kidney disease and was in the hospital for several weeks then discharged to acute rehab  She has been discharged from acute rehab and is doing much better  She does use oxygen at baseline  Her only complaint today are recurring nosebleeds that she has been getting since there he turned on, likely secondary to dry air and complicated by Plavix use  Her most recent CBC is within normal limits  She denies headache, confusion, dizziness, syncope, seizure, vision complaints, speech deficits, further falls, gait abnormality, focal neurologic weakness  She has resumed her Plavix and fish oil  She is complaining of right-sided cervical strain and muscle spasms which caused slight headaches  She has had these since her cervical fusion in May of 2019  She does state that Robaxin helps  She would like a prescription for physical therapy to work on strengthening and range of motion of her neck  This was provided to her today  REVIEW OF SYSTEMS    Review of Systems   Constitutional: Positive for fatigue  HENT: Negative  Eyes: Positive for visual disturbance (double vision )  Respiratory: Positive for cough, shortness of breath and wheezing  CHF     Cardiovascular: Negative  Gastrointestinal: Negative  Endocrine: Negative  Genitourinary: Negative  Musculoskeletal: Positive for back pain (upper back pain ), gait problem (uses walker ) and neck pain (back of neck )  Negative for arthralgias, joint swelling, myalgias and neck stiffness  Skin: Negative  Neurological: Positive for headaches (Right side of head )  Negative for dizziness, tremors, seizures, syncope, facial asymmetry, speech difficulty, weakness, light-headedness and numbness  Tingling top of Right head      Hematological: Bruises/bleeds easily (Plavix 75mg)  Psychiatric/Behavioral: Negative            Meds/Allergies     Current Outpatient Medications   Medication Sig Dispense Refill    albuterol (2 5 mg/3 mL) 0 083 % nebulizer solution Take 1 vial (2 5 mg total) by nebulization 3 (three) times a day 25 vial 3    albuterol (PROVENTIL HFA,VENTOLIN HFA) 90 mcg/act inhaler Inhale 2 puffs every 4 (four) hours as needed for wheezing      clopidogrel (PLAVIX) 75 mg tablet Take 1 tablet (75 mg total) by mouth daily 30 tablet 3    gabapentin (NEURONTIN) 300 mg capsule Take 1 capsule (300 mg total) by mouth 3 (three) times a day 60 capsule 0    isosorbide mononitrate (IMDUR) 30 mg 24 hr tablet Take 1 tablet (30 mg total) by mouth daily 30 tablet 3    levothyroxine 112 mcg tablet Take 112 mcg by mouth daily       metolazone (ZAROXOLYN) 5 mg tablet Take 1 tablet (5 mg total) by mouth as needed (if gains 2lbs in 2 days  ) 15 tablet 0    metoprolol tartrate (LOPRESSOR) 25 mg tablet Take 25 mg by mouth 2 (two) times a day       Multiple Vitamins-Calcium (MULTI-DAY/CALCIUM/EXTRA IRON PO) Take 1 tablet by mouth daily      Omega-3 Fatty Acids (FISH OIL) 1,000 mg Fish Oil 1,000 mg capsule      Red Yeast Rice Extract (RED YEAST RICE PO) Take 600 mg by mouth daily      sertraline (ZOLOFT) 25 mg tablet 25 mg daily at bedtime       torsemide (DEMADEX) 20 mg tablet Take 2 tablets (40 mg total) by mouth 2 (two) times a day 120 tablet 3     No current facility-administered medications for this visit  Allergies   Allergen Reactions    Duloxetine Hcl Other (See Comments) and Hypertension    Duloxetine Hcl      Cymbalta;  Hemorrhagic stroke listed as reaction    Escitalopram      Other reaction(s): Urinary Retention    Pregabalin      Other reaction(s): Hypertension    Statins Myalgia    Tramadol      Other reaction(s): Hypertension    Triprolidine-Pse Other (See Comments)    Anastrozole Abdominal Pain    Anastrozole     Antihistamines, Diphenhydramine-Type     Exemestane      Aromasin; Muscle pain & cramps    Lexapro [Escitalopram]      Urinary retention    Lyrica [Pregabalin]      hypertension    Metformin      diarrhea    Oxycodone      Pt unsure      Statins Muscle pain & cramps    Tramadol      hypertension    Triprolidine-Pseudoephedrine     Metformin Diarrhea       PAST HISTORY    Past Medical History:   Diagnosis Date    Arthritis     Breast cancer ([facility] [address] ) 2015    Cardiac disease     aortic valve transplant    [facility]congestive heart failure) ([facility])     Compression fracture of body of thoracic vertebra ([facility])     CVA (cerebral vascular accident) ([facility] [address] )     Diabetes mellitus ([facility] )     Disease of thyroid gland     Fibromyalgia, primary     H/O cervical fracture 01/09/2019    Hyperlipidemia     Hypertension     Neuropathy     AZUL (obstructive sleep apnea) 10/19/2019    Pressure injury of skin     Renal disorder     kidney stent    Stroke [facility])        Past Surgical History:   Procedure Laterality Date    AORTIC VALVE SURGERY      BREAST LUMPECTOMY Right     BREAST LUMPECTOMY Left     BREAST SURGERY      lumpectomy for breast cancer    CATARACT EXTRACTION      CHOLECYSTECTOMY      HYSTERECTOMY  1978    JOINT REPLACEMENT      KIDNEY SURGERY      stent placed for kidney    OTHER SURGICAL HISTORY      abdominal aneurysm surgery    MI ARTHRODESIS POSTERIOR ATLAS-AXIS C1-C2 N/A 5/1/2019    Procedure: C1-C2 lateral mass fixation fusion with possible sublaminar cables;  Surgeon: Bandar Horan MD;  Location: [facility];  Service: Neurosurgery    REPLACEMENT TOTAL KNEE      left        Social History     Tobacco Use    Smoking status: Never Smoker    Smokeless tobacco: Never Used   Substance Use Topics    Alcohol use: Yes     Frequency: Monthly or less     Drinks per session: 1 or 2     Binge frequency: Less than monthly    Drug use: Never       Family History   Problem Relation Age of Onset    Heart disease Mother     Arthritis Mother     Diabetes Mother     Heart failure Father     Arthritis Father     Other Father         enlargement of prostate     Dementia Father     No Known Problems Daughter     No Known Problems Maternal Grandmother     No Known Problems Maternal Grandfather     No Known Problems Paternal Grandmother     No Known Problems Paternal Grandfather     No Known Problems Maternal Aunt     No Known Problems Maternal Aunt     No Known Problems Maternal Aunt     No Known Problems Maternal Aunt     No Known Problems Paternal Aunt     No Known Problems Paternal Aunt          Above history personally reviewed  EXAM    Vitals:Blood pressure 130/78, pulse 74, temperature 98 7 °F (37 1 °C), temperature source Temporal, height 4' 11" (1 499 m), weight 82 8 kg (182 lb 9 6 oz), not currently breastfeeding  ,Body mass index is 36 88 kg/m²  Physical Exam   Constitutional: She is oriented to person, place, and time  She appears well-developed and well-nourished  HENT:   Head: Normocephalic and atraumatic  Eyes: Pupils are equal, round, and reactive to light  Neck: Normal range of motion  Cardiovascular: Normal rate  Pulmonary/Chest: Effort normal  No respiratory distress  Abdominal: Soft  Musculoskeletal: Normal range of motion  Neurological: She is alert and oriented to person, place, and time  She has normal strength  She has a normal Finger-Nose-Finger Test and a normal Tandem Gait Test  Gait normal    Reflex Scores:       Tricep reflexes are 2+ on the right side and 2+ on the left side  Bicep reflexes are 2+ on the right side and 2+ on the left side  Brachioradialis reflexes are 2+ on the right side and 2+ on the left side  Patellar reflexes are 2+ on the right side and 2+ on the left side  Achilles reflexes are 2+ on the right side and 2+ on the left side  Skin: Skin is warm and dry  Psychiatric: She has a normal mood and affect  Her speech is normal and behavior is normal  Judgment and thought content normal    Nursing note and vitals reviewed  Neurologic Exam     Mental Status   Oriented to person, place, and time  Recall at 5 minutes: recalls 3 of 3 objects  Follows 2 step commands  Attention: normal  Concentration: normal    Speech: speech is normal   Level of consciousness: alert  Knowledge: good  Able to perform simple calculations  Able to name object  Able to repeat  Normal comprehension  Cranial Nerves   Cranial nerves II through XII intact  CN III, IV, VI   Pupils are equal, round, and reactive to light  Motor Exam   Muscle bulk: normal  Overall muscle tone: normal  Right arm pronator drift: absent  Left arm pronator drift: absent    Strength   Strength 5/5 throughout  Sensory Exam   Light touch normal    Pinprick normal    DST and JPS intact bilaterally     Gait, Coordination, and Reflexes     Gait  Gait: normal (uses walker for ambulation assistance)    Coordination   Finger to nose coordination: normal  Tandem walking coordination: normal    Tremor   Resting tremor: absent    Reflexes   Right brachioradialis: 2+  Left brachioradialis: 2+  Right biceps: 2+  Left biceps: 2+  Right triceps: 2+  Left triceps: 2+  Right patellar: 2+  Left patellar: 2+  Right achilles: 2+  Left achilles: 2+  Right : 2+  Left : 2+  Right Anderson: absent  Left Anderson: absent  Right ankle clonus: absent  Left ankle clonus: absent        MEDICAL DECISION MAKING    Imaging Studies:     Ct Head Wo Contrast  Result Date: 12/2/2019  Impression: No acute intracranial abnormality  Resolved right parafalcine subdural hemorrhage  Stable generalized atrophy and moderate cerebral chronic microangiopathic disease  Workstation performed: [id number]       I have personally reviewed pertinent reports     and I have personally reviewed pertinent films in PACS

## 2019-12-03 NOTE — PATIENT INSTRUCTIONS
May resume plavix and any other blood thinning medications you were asked to stop taking after your fall    Return as needed

## 2019-12-03 NOTE — ASSESSMENT & PLAN NOTE
Trace left anterior falx SDH after fall at [address] on 10/9/19  · H/o C1-2 lateral mass fusion in May 2019; imaging after fall showed stable fusion with intact hardware  · Patient was on Plavix and asked to hold for 2 weeks  · Recently hospitalized for acute on chronic kidney failure and pulmonary edema, now discharged from rehab    Imaging:  · CT head w/o, 11/27/19: complete resolution of previously seen SDH    Plan:  · Ok to resume all AC/AP  · Return as needed

## 2019-12-11 ENCOUNTER — OFFICE VISIT (OUTPATIENT)
Dept: CARDIOLOGY CLINIC | Facility: CLINIC | Age: 84
End: 2019-12-11
Payer: COMMERCIAL

## 2019-12-11 VITALS
DIASTOLIC BLOOD PRESSURE: 68 MMHG | HEIGHT: 59 IN | WEIGHT: 184.4 LBS | OXYGEN SATURATION: 100 % | BODY MASS INDEX: 37.17 KG/M2 | HEART RATE: 66 BPM | SYSTOLIC BLOOD PRESSURE: 126 MMHG

## 2019-12-11 DIAGNOSIS — E78.5 HYPERLIPIDEMIA LDL GOAL <100: ICD-10-CM

## 2019-12-11 DIAGNOSIS — I10 HTN (HYPERTENSION), BENIGN: ICD-10-CM

## 2019-12-11 DIAGNOSIS — G47.33 OSA (OBSTRUCTIVE SLEEP APNEA): ICD-10-CM

## 2019-12-11 DIAGNOSIS — I50.22 CHRONIC SYSTOLIC HEART FAILURE (HCC): Primary | ICD-10-CM

## 2019-12-11 PROCEDURE — 99215 OFFICE O/P EST HI 40 MIN: CPT | Performed by: INTERNAL MEDICINE

## 2019-12-11 PROCEDURE — 3074F SYST BP LT 130 MM HG: CPT | Performed by: INTERNAL MEDICINE

## 2019-12-11 PROCEDURE — 3078F DIAST BP <80 MM HG: CPT | Performed by: INTERNAL MEDICINE

## 2019-12-11 NOTE — PROGRESS NOTES
Heart Failure Consult Note - Dequan Dillon 80 y o  female MRN: [id number]    @ Encounter: [id number]      Assessment/Plan:    Patient Active Problem List    Diagnosis Date Noted    Wheezing 11/04/2019    CKD (chronic kidney disease) stage 3, GFR 30-59 ml/min ([facility]) 10/29/2019    Alkalosis 10/29/2019    Acute on chronic respiratory failure with hypoxia ([facility] ) 10/21/2019    Type 2 MI (myocardial infarction) ([place] [address] ) 10/21/2019    Chronic anemia 10/19/2019    SDH (subdural hematoma) ([place] [address] ) 10/19/2019    AZUL (obstructive sleep apnea) 10/19/2019    Acute-on-chronic kidney injury ([place] [address] ) 08/16/2019    H/O spinal fusion 07/23/2019    Hyponatremia 01/21/2019    Acute on chronic diastolic heart failure ([place] [address] ) 01/17/2019    Depression 01/17/2019    Essential hypertension 01/09/2019    Controlled type 2 diabetes mellitus with diabetic neuropathy, without long-term current use of insulin ([place] [address] ) 10/24/2017    SLE (systemic lupus erythematosus) ([place] [address] ) 10/24/2017    Vitamin D deficiency 08/22/2017    Renal artery stenosis ([place] [address] ) 02/08/2017    Peripheral polyneuropathy 02/07/2017    Hypothyroidism 09/23/2016    H/O: CVA (cerebrovascular accident) 09/23/2016    History of malignant neoplasm of breast 09/23/2016    History of aortic valve replacement 07/21/2015    RA (rheumatoid arthritis) ([place] [address] ) 07/21/2015    Hyperlipidemia 10/01/2013    Osteoporosis 10/01/2013    Morbid obesity ([place] [address] ) 10/12/2012     # HFrEF, stage C, NYHA  Etiology: given persistent wall motion abnormalities on echo must rule out ischemia (previously hesitant given CKD), vs HTN vs stress myopathy though typical in motion atypical in that echo in August was showing similar wall motion abnormalities    Weight: 184 lbs  When admitted in August recorded at 220 lbs  NT proBNP 11/4/19: 27,700    EKG: SR, low voltage    Echocardiogram 10/22/19  LVEF: 40%, akinesis of distal half of anterior, lateral and inferior walls with akinesis of distal 3rd of septum, akinetic apex- looked like   Takotsubo  Grade 2 DD  LVIDd: 4 7 cm  RV: normal  MR: moderate to severe  PASP:  RVOT:   Other: moderate TR    Echo 8/17/19  LVEF: 45%, same wall motion abnormalities as above- but not nearly as severe    Lab Results   Component Value Date    NTBNP 27,700 (H) 11/04/2019        Neurohormonal Blockade:  --Beta-Blocker: metoprolol tartrate 25 mg BID  --ACEi, ARB or ARNi: CKD4  Imdur 30 mg daily  --Aldosterone Receptor Blocker:  --Diuretic: torsemide 40 mg BID- not on potassium and last K was 4 6    Sudden Cardiac Death Risk Reduction:  --ICD:     Electrical Resynchronization:  --Candidacy for BiV device:    Advanced Therapies (if appropriate): --Inotrope:  --LVAD/Transplant Candidacy:    # CKD3, Cr 1 75 on 12/3  GFR 26  # subdural hematoma  # AZUL- non compliant with treatment  # HTN, hx of DONAVAN stenosis and stenting  # hyperlipidemia  # DM2  # Hx of CVA x 2- mild right leg weakness  # hx of bioprosthetic AVR  # gait dysfunction- not very mobile  Uses walker    Today's Plan:  Limited echo to follow up on heart function as last echo showed a takotsubo type pattern- if it was I should see   I will start with nuclear perfusion imaging given her CKD to risk assess ischemic burden given decreased EF  --2g sodium diet  Weights daily    HPI:      79 yo female who is being evaluated for HFrEF  She has a history of bioprosthetic AVR, previous HFpEF, HTN, AZUL not using CPAP but uses oxygen at night, hyperlipidemia, hx of CVA, DM2  Use to see Dr Mahi Malave at [address]  She was in [facility] following subdural hematoma from a fall  She became acutely short of breath requiring BiPap for stabilization  ABG did shows CO2 retention  Previously admitted in August to [facility] for diastolic heart failure  She diuresed around 35 lbs  This was her first admit for heart failure  Discharged at 192 lbs, admitted at 220 lbs            Review of Systems   Constitutional: Negative for activity change, appetite change, fatigue and unexpected weight change  HENT: Negative for congestion and nosebleeds  Eyes: Negative  Respiratory: Negative for cough, chest tightness and shortness of breath  Cardiovascular: Negative for chest pain, palpitations and leg swelling  Gastrointestinal: Negative for abdominal distention  Endocrine: Negative  Genitourinary: Negative  Musculoskeletal: Negative  Skin: Negative  Neurological: Negative for dizziness, syncope and weakness  Hematological: Negative  Psychiatric/Behavioral: Negative  Past Medical History:   Diagnosis Date    Arthritis     Breast cancer [facility]) 2015    Cardiac disease     aortic valve transplant    [facility]est[facility]) [facility]     Compression fracture of body of thoracic vertebra ([facility])     CVA [facility]cerebral vascular accident) [facility] )     Diabetes mellitus ([facility] )     Disease of thyroid gland     Fibromyalgia, primary     H/O cervical fracture 01/09/2019    Hyperlipidemia     Hypertension     Neuropathy     AZUL (obstructive sleep apnea) 10/19/2019    Pressure injury of skin     Renal disorder     kidney stent    Stroke ([facility])        Allergies   Allergen Reactions    Duloxetine Hcl Other (See Comments) and Hypertension    Duloxetine Hcl      Cymbalta;  Hemorrhagic stroke listed as reaction    Escitalopram      Other reaction(s): Urinary Retention    Pregabalin      Other reaction(s): Hypertension    Statins Myalgia    Tramadol      Other reaction(s): Hypertension    Triprolidine-Pse Other (See Comments)    Anastrozole Abdominal Pain    Anastrozole     Antihistamines, Diphenhydramine-Type     Exemestane      Aromasin; Muscle pain & cramps    Lexapro [Escitalopram]      Urinary retention    Lyrica [Pregabalin]      hypertension    Metformin      diarrhea    Oxycodone      Pt unsure      Statins      Muscle pain & cramps    Tramadol      hypertension    Triprolidine-Pseudoephedrine     Metformin Diarrhea      Current Outpatient Medications:     albuterol (2 5 mg/3 mL) 0 083 % nebulizer solution, Take 1 vial (2 5 mg total) by nebulization 3 (three) times a day, Disp: 25 vial, Rfl: 3    albuterol (PROVENTIL HFA,VENTOLIN HFA) 90 mcg/act inhaler, Inhale 2 puffs every 4 (four) hours as needed for wheezing, Disp: , Rfl:     clopidogrel (PLAVIX) 75 mg tablet, Take 1 tablet (75 mg total) by mouth daily, Disp: 30 tablet, Rfl: 3    gabapentin (NEURONTIN) 300 mg capsule, Take 1 capsule (300 mg total) by mouth 3 (three) times a day, Disp: 60 capsule, Rfl: 0    levothyroxine 112 mcg tablet, Take 112 mcg by mouth daily , Disp: , Rfl:     metoprolol tartrate (LOPRESSOR) 25 mg tablet, Take 25 mg by mouth 2 (two) times a day , Disp: , Rfl:     Multiple Vitamins-Calcium (MULTI-DAY/CALCIUM/EXTRA IRON PO), Take 1 tablet by mouth daily, Disp: , Rfl:     Red Yeast Rice Extract (RED YEAST RICE PO), Take 600 mg by mouth daily, Disp: , Rfl:     sertraline (ZOLOFT) 25 mg tablet, 25 mg daily at bedtime , Disp: , Rfl:     torsemide (DEMADEX) 20 mg tablet, Take 2 tablets (40 mg total) by mouth 2 (two) times a day, Disp: 120 tablet, Rfl: 3    isosorbide mononitrate (IMDUR) 30 mg 24 hr tablet, Take 1 tablet (30 mg total) by mouth daily (Patient not taking: Reported on 12/11/2019), Disp: 30 tablet, Rfl: 3    metolazone (ZAROXOLYN) 5 mg tablet, Take 1 tablet (5 mg total) by mouth as needed (if gains 2lbs in 2 days ) (Patient not taking: Reported on 12/11/2019), Disp: 15 tablet, Rfl: 0    Omega-3 Fatty Acids (FISH OIL) 1,000 mg, Fish Oil 1,000 mg capsule, Disp: , Rfl:     Social History     Socioeconomic History    Marital status:       Spouse name: Not on file    Number of children: 3    Years of education: Not on file    Highest education level: Not on file   Occupational History    Not on file   Social Needs    Financial resource strain: Not on file    Food insecurity:     Worry: Not on file     Inability: Not on file   [employer] needs:     Medical: Not on file     Non-medical: Not on file   Tobacco Use    Smoking status: Never Smoker    Smokeless tobacco: Never Used   Substance and Sexual Activity    Alcohol use: Yes     Frequency: Monthly or less     Drinks per session: 1 or 2     Binge frequency: Less than monthly    Drug use: Never    Sexual activity: Not Currently   Lifestyle    Physical activity:     Days per week: Not on file     Minutes per session: Not on file    Stress: Not on file   Relationships    Social connections:     Talks on phone: Not on file     Gets together: Not on file     Attends Yazdanism service: Not on file     Active member of club or organization: Not on file     Attends meetings of clubs or organizations: Not on file     Relationship status: Not on file    Intimate partner violence:     Fear of current or ex partner: Not on file     Emotionally abused: Not on file     Physically abused: Not on file     Forced sexual activity: Not on file   Other Topics Concern    Not on file   Social History Narrative    ** Merged History Encounter **            Family History   Problem Relation Age of Onset    Heart disease Mother     Arthritis Mother     Diabetes Mother     Heart failure Father     Arthritis Father     Other Father         enlargement of prostate     Dementia Father     No Known Problems Daughter     No Known Problems Maternal Grandmother     No Known Problems Maternal Grandfather     No Known Problems Paternal Grandmother     No Known Problems Paternal Grandfather     No Known Problems Maternal Aunt     No Known Problems Maternal Aunt     No Known Problems Maternal Aunt     No Known Problems Maternal Aunt     No Known Problems Paternal Aunt     No Known Problems Paternal Aunt        Physical Exam:    Vitals: Blood pressure 126/68, pulse 66, height 4' 11" (1 499 m), weight 83 6 kg (184 lb 6 4 oz), SpO2 100 %, not currently breastfeeding , Body mass index is 37 24 kg/m² ,   Wt Readings from Last 3 Encounters:   12/11/19 83 6 kg (184 lb 6 4 oz)   12/03/19 82 8 kg (182 lb 9 6 oz)   11/06/19 83 8 kg (184 lb 12 8 oz)       Physical Exam   Constitutional: She is oriented to person, place, and time  She appears well-developed and well-nourished  HENT:   Head: Normocephalic and atraumatic  Eyes: Pupils are equal, round, and reactive to light  Neck: Normal range of motion  No JVD present  Cardiovascular: Normal rate and regular rhythm  No murmur heard  Pulmonary/Chest: Effort normal and breath sounds normal  No respiratory distress  Abdominal: Soft  She exhibits no distension  There is no tenderness  Musculoskeletal: Normal range of motion  She exhibits no edema  Neurological: She is alert and oriented to person, place, and time  Skin: Skin is warm and dry  No rash noted  Psychiatric: She has a normal mood and affect  Labs & Results:    Lab Results   Component Value Date    WBC 5 4 11/16/2019    HGB 10 6 11/16/2019    HCT 31 11/16/2019     11/16/2019     11/16/2019     Lab Results   Component Value Date    SODIUM 137 11/16/2019    K 4 0 11/16/2019    CL 95 11/16/2019    CO2 33 (H) 11/06/2019    BUN 78 11/16/2019    CREATININE 1 88 11/16/2019    GLUC 120 11/16/2019    CALCIUM 9 2 11/16/2019     Lab Results   Component Value Date    NTBNP 27,700 (H) 11/04/2019      Lab Results   Component Value Date    CHOLESTEROL 144 10/23/2019    CHOLESTEROL 204 (H) 08/16/2019    CHOLESTEROL 193 04/09/2019     Lab Results   Component Value Date    HDL 33 (L) 10/23/2019    HDL 56 08/16/2019    HDL 48 04/09/2019     Lab Results   Component Value Date    TRIG 223 (H) 10/23/2019    TRIG 99 08/16/2019    TRIG 397 (H) 04/09/2019     Lab Results   Component Value Date    NONHDLC 111 10/23/2019    [place] 148 08/16/2019    [place] 145 04/09/2019       EKG personally reviewed by Olya Molina DO       Counseling / Coordination of Care  Total floor / unit time spent today 40 minutes  Greater than 50% of total time was spent with the patient and / or family counseling and / or coordination of care  A description of the counseling / coordination of care: 25 min  Thank you for the opportunity to participate in the care of this patient  Shilpi PAK    Director of Advanced Heart Failure Program  Medical Director of [address] [place] [address]

## 2019-12-11 NOTE — PATIENT INSTRUCTIONS
I ordered an limited echo to follow up on your heart function    I also ordered a nuclear stress test to look at blood flow to your heart    Please check daily weights and contact the heart failure program at [phone] if you gain 3 lb in one day or 5 lb in one week  Please limit daily sodium intake to 2000 mg daily  Please limit daily fluid intake to 2000 ml daily  Bring complete list of medications to your follow up appointment  Low-Salt Choices  Eating salt (sodium) can make your body retain too much water  Excess water makes your heart work harder  Canned, packaged, and frozen foods are easy to prepare  But they are often high in sodium  Here are some ideas for low-salt foods you can easily make yourself  For breakfast  · Fruit or 100% fruit juice  · Whole-wheat bread or an English muffin  Look for sodium content on Nutrition Facts labels    · Low-fat milk or yogurt  · Unsalted eggs  · Shredded wheat  · Corn tortillas  · Unsalted steamed rice  · Regular (not instant) hot cereal, made without salt    Stay away from:  · Sausage, tamez, and ham  · Flour tortillas  · Packaged muffins, pancakes, and biscuits  · Instant hot cereals  · Cottage cheese    Good Choices:  · Fresh fish, chicken, turkey, or meatbaked, broiled, or roasted without salt  · Dry beans, cooked without salt  · Tofu, stir-fried without salt  · Unsalted fresh fruit and vegetables, or frozen or canned fruit and vegetables with no added salt    Stay away from:  · Lunch or deli meat that is cured or smoked  · Cheese  · Tomato juice and ketchup  · Canned vegetables, soups, and fish not labeled as no-salt-added or reduced sodium  · Packaged gravies and sauces  · Olives, pickles, and relish  · Bottled salad dressings    For snacks and desserts  · Yogurt  · Unsalted, air-popped popcorn  · Unsalted nuts or seeds    Stay away from:  · Pies and cakes  · Packaged dessert mixes  · Pizza  · Canned and packaged puddings  · Pretzels, chips, crackers, and nutsunless the label says unsalted

## 2019-12-18 ENCOUNTER — TELEPHONE (OUTPATIENT)
Dept: NEPHROLOGY | Facility: CLINIC | Age: 84
End: 2019-12-18

## 2019-12-18 NOTE — TELEPHONE ENCOUNTER
The patient should contact the cardiologist because I have not seen this patient since 2015  They will be more familiar  Thank you

## 2019-12-18 NOTE — TELEPHONE ENCOUNTER
Pt's daughter called stating she is concerned with some swelling that has been worsening, she is also going to be reaching out to Cardiology, who follows her closely but she wanted Gisselle Price to be aware as well   Upcoming appt on 1/3/20

## 2019-12-21 NOTE — UTILIZATION REVIEW
[address]  Utilization Review Department  Phone: [phone]; Fax [phone]  [email or web address]  ATTENTION: Please call with any questions or concerns to [phone]  and carefully listen to the prompts so that you are directed to the right person  Send all requests for admission clinical reviews, approved or denied determinations and any other requests to fax [phone]  All voicemails are confidential         Initial Clinical Review    Admission: Date/Time/Statement: 1/9/19 AT 2352 ADMIT INPATIENT TO ICU     Orders Placed This Encounter   Procedures    Inpatient Admission     Standing Status:   Standing     Number of Occurrences:   1     Order Specific Question:   Admitting Physician     Answer:   Ronny Leader [[id number]]     Order Specific Question:   Level of Care     Answer:   Critical Care [15]     Order Specific Question:   Estimated length of stay     Answer:   More than 2 Midnights     Order Specific Question:   Certification     Answer:   I certify that inpatient services are medically necessary for this patient for a duration of greater than two midnights  See H&P and MD Progress Notes for additional information about the patient's course of treatment  ED: Date/Time/Mode of Arrival:   ED Arrival Information     Expected Arrival Acuity Means of Arrival Escorted By Service Admission Type    - 1/9/2019 20:31 Immediate Ambulance [address] Complaint    Fall        Chief Complaint:   Chief Complaint   Patient presents with    Trauma     Pt was walking with 2 canes and fell, +head injury, +neck pain, -LOC, +plavix, 50mcg iv fentanyl en route  Arrives with collar  Chief Complaint: Neck + Back pain post Fall     History of Present Illness:  Mey Toth is a 80 y o  female who presents with  Fall and neck pain on Plavix  Denies LOC   Mechanical fall after slip while walking with two canes      Mechanism:  FALL    Physical Exam   Head: Normocephalic  Neck: Normal range of motion  No JVD present  Tracheal tenderness and muscular tenderness present  No spinous process tenderness present  Cardiovascular: Normal rate, regular rhythm, normal heart sounds and intact distal pulses  Exam reveals no gallop and no friction rub  No murmur heard  Pulmonary/Chest: Effort normal and breath sounds normal  No stridor  No respiratory distress  She has no wheezes  Abdominal: Soft  Bowel sounds are normal  She exhibits no distension  There is no tenderness  Musculoskeletal: She exhibits edema  Cervical back: She exhibits tenderness  Thoracic back: She exhibits no tenderness  Lumbar back: She exhibits no tenderness  Skin: Skin is warm and dry     inframammary rash c/w candidal infection noted        ED Vital Signs:   ED Triage Vitals   Temperature Pulse Respirations Blood Pressure SpO2   01/09/19 2035 01/09/19 2035 01/09/19 2035 01/09/19 2035 01/09/19 2035   98 8 °F (37 1 °C) 70 22 169/70 99 %      Temp Source Heart Rate Source Patient Position - Orthostatic VS BP Location FiO2 (%)   01/09/19 2035 01/09/19 2035 01/09/19 2040 01/09/19 2240 --   Oral Monitor Lying Right arm       Pain Score       01/09/19 2038       3        Wt Readings from Last 1 Encounters:   01/09/19 93 3 kg (205 lb 11 oz)      01/09 0701  01/10 0700 01/10 0701  01/10 0910  Most Recent    Temperature (°F) 97 698 8 97 6  97 6 (36 4)    Pulse 5670 5456  56    Respirations 1628 1220  20    Blood Pressure 133/92209/74 166/61  166/61    Shock Index 0 310 44 0 33  0 33    SpO2 (%)  88  99 9398  93          LABS/Diagnostic Test Results:   CBC and differential [[id number]] (Abnormal) Collected: 01/09/19 2049   Lab Status: Final result Specimen: Blood from Arm, Left Updated: 01/09/19 2059    WBC 8 84 4 31 - 10 16 Thousand/uL     RBC 3 70 (L) 3 81 - 5 12 Million/uL     Hemoglobin 11 8 11 5 - 15 4 g/dL     Hematocrit 37 5 34 8 - 46 1 %      (H) 82 - 98 fL     MCH 31 9 26 8 - 34 3 pg     MCHC 31 5 31 4 - 37 4 g/dL     RDW 13 2 11 6 - 15 1 %     MPV 9 4 8 9 - 12 7 fL     Platelets 313 804 - 164 Thousands/uL     nRBC 0 /100 WBCs     Neutrophils Relative 63 43 - 75 %     Immat GRANS % 2 0 - 2 %     Lymphocytes Relative 27 14 - 44 %     Monocytes Relative 6 4 - 12 %     Eosinophils Relative 1 0 - 6 %     Basophils Relative 1 0 - 1 %     Neutrophils Absolute 5 66 1 85 - 7 62 Thousands/µL     Immature Grans Absolute 0 14 0 00 - 0 20 Thousand/uL     Lymphocytes Absolute 2 42 0 60 - 4 47 Thousands/µL     Monocytes Absolute 0 50 0 17 - 1 22 Thousand/µL     Eosinophils Absolute 0 06 0 00 - 0 61 Thousand/µL     Basophils Absolute 0 06 0 00 - 0 10 Thousands/µL    Comprehensive metabolic panel [[id number]] (Abnormal) Collected: 01/09/19 2049   Lab Status: Final result Specimen: Blood from Arm, Left Updated: 01/09/19 2120    Sodium 137 136 - 145 mmol/L     Potassium 4 4 3 5 - 5 3 mmol/L     Chloride 102 100 - 108 mmol/L     CO2 27 21 - 32 mmol/L     ANION GAP 8 4 - 13 mmol/L     BUN 25 5 - 25 mg/dL     Creatinine 1 12 0 60 - 1 30 mg/dL         Glucose 161 (H) 65 - 140 mg/dL         Calcium 9 0 8 3 - 10 1 mg/dL     AST 36 5 - 45 U/L         ALT 38 12 - 78 U/L         Alkaline Phosphatase 70 46 - 116 U/L     Total Protein 7 7 6 4 - 8 2 g/dL     Albumin 3 9 3 5 - 5 0 g/dL     Total Bilirubin 0 42 0 20 - 1 00 mg/dL     eGFR 45 ml/min/1 73sq m      Protime-INR [[id number]] (Normal) Collected: 01/10/19 0530   Lab Status: Final result Specimen: Blood from Arm, Left Updated: 01/10/19 0601    Protime 13 5 11 8 - 14 2 seconds     INR 1 02 0 86 - 1 17        CT HEAD -  No acute intracranial abnormality        CT C-SPINE -  1   Type III odontoid fracture   The fracture line extends to the right and left superior articular facets and left transverse foramen of C2   2   Acute nondisplaced fracture through bulky, bridging osteophytes at the C6 level      ED Treatment:   Medication Administration from 01/09/2019 2023 to 01/09/2019 2231       Date/Time Order Dose Route Action Action by Comments     01/09/2019 2106  EMS REPLENISHMENT MED 0  Does not apply Given to EMS Cecilia Nesbitt RN      01/09/2019 2056 iohexol (OMNIPAQUE) 350 MG/ML injection (MULTI-DOSE) 85 mL 85 mL Intravenous Given Michelle Bleak      01/09/2019 2204 HYDROmorphone (DILAUDID) injection 0 5 mg 0 5 mg Intravenous Given Melchor Spray, RN      01/09/2019 2203 heparin (porcine) subcutaneous injection 5,000 Units 5,000 Units Subcutaneous Not Given Melchor Spray, RN      01/09/2019 2203 clotrimazole (LOTRIMIN) 1 % cream   Topical Not Given Melchor Beaumont, RN         Past Medical/Surgical History:   Past Medical History:   Diagnosis Date    [facility]F [facility]congestive [facility] failure) ([facility] )     Hypertension     Stroke [facility])      Admitting Diagnosis: Multiple injuries [T07  XXXA]  Closed nondisplaced fracture of second cervical vertebra ([facility] ) [S12 101A]    Age/Sex: 80 y o  female      Assessment/Plan   Trauma Alert: Level B  Model of Arrival: Ambulance  Trauma Team: Attending,  Fellow   Consultants: Neurosurgery     Trauma Active Problems:    Fall  Head strike on Plavix   Back pain  Neck pain     Trauma Plan:                 Fall                 - PT/OT eval               Head Strike on Plavix                 -CT Head               Neck Pain/Back Pain                  - CT Neck                  - CTA Neck                  - Neurosurgery Consulted                  -Ambulatory trial                  -Pain control         Admission Orders:  1/9/19 AT 2352   ADMIT INPATIENT TO ICU   CARDIO PULM MONITORING  VS + NEURO CHECKS PER ICU Q1HR    ASPEN COLLAR  [place] q2hrs  SCD    Nasal O2 at 4 L/min TITRATE PER SPO2  Continuous Pulse Oximetry    NPO    Continuous IV Infusions:   sodium chloride 75 mL/hr Last Rate: 75 mL/hr (01/10/19 0550)     Scheduled Meds:   Current Facility-Administered Medications:  acetaminophen 650 mg Oral Q6H [address]     albuterol 2 puff Inhalation Q4H PRN     albuterol 2 5 mg Nebulization Q6H PRN     amLODIPine 5 mg Oral Daily     chlorhexidine 15 mL Swish & Spit Q12H [address]     furosemide 20 mg Oral Daily     gabapentin 100 mg Oral HS     HYDROmorphone 0 5 mg Intravenous Q4H PRN     levothyroxine 112 mcg Oral Early Morning     lidocaine 1 patch Topical Once     LORazepam 0 5 mg Oral Q6H PRN     losartan 100 mg Oral Daily     metoprolol tartrate 25 mg Oral Q12H KARINE     nystatin  Topical BID     ondansetron 4 mg Intravenous Q4H PRN     oxyCODONE 2 5 mg Oral Q4H PRN     oxyCODONE 5 mg Oral Q4H PRN     sodium chloride 75 mL/hr Intravenous Continuous  Last Rate: 75 mL/hr (01/10/19 0550)     PRN Meds:      albuterol    IV HYDROmorphone 0 5 mg q4hrs prn given x 3    IV LORazepam    IV ondansetron 4 mg q4hrs prn given x 2    oxyCODONE 2 5 mg q4hrs prn given x 1    oxyCODONE    CONSULT NEUROSURGERY    PT EVAL    OT EVAL    DYSPHAGIA ASSESSMENT Yes

## 2019-12-23 ENCOUNTER — TELEPHONE (OUTPATIENT)
Dept: NEPHROLOGY | Facility: CLINIC | Age: 84
End: 2019-12-23

## 2019-12-23 NOTE — TELEPHONE ENCOUNTER
Patient's daughter called and stated the patient is having UTI symptoms such as Urgency, frequency, but not always able to void  She is also having burning with urination  Patient also lost about 6lbs the past week, though the patient says she has no appetite  She has been having a metallic taste in her mouth, her food doesn't taste right  SOB on exertion which is not new, her O2 is 88 on room air, on oxygen 97  She called the PCP first, who said to go to ER  Daughter would like to avoid that if it's not necessary  Caregiver did get a urine catch today, can she drop it at the lab for testing?

## 2019-12-23 NOTE — TELEPHONE ENCOUNTER
----- Message from [address]  [address]oor sent at 12/23/2019  2:16 PM EST -----  Hi! I agree with PCP to be evaluate in ER or even urgent care      Thanks  [employer]

## 2019-12-23 NOTE — TELEPHONE ENCOUNTER
I spoke to the patient's daughter Shanell Villeda and she is aware the patient needs to be evaluated at the ER or Urgent Care

## 2019-12-31 ENCOUNTER — DOCUMENTATION (OUTPATIENT)
Dept: NEPHROLOGY | Facility: CLINIC | Age: 84
End: 2019-12-31

## 2019-12-31 LAB
EXT GLUCOSE BLD: 87
EXTERNAL ANION GAP: 11
EXTERNAL BUN: 66
EXTERNAL CALCIUM: 9.3
EXTERNAL CHLORIDE: 100
EXTERNAL CO2: 27
EXTERNAL CREATININE: 1.75
EXTERNAL EGFR: 26
EXTERNAL POTASSIUM: 4.6
EXTERNAL SODIUM: 138

## 2020-01-02 PROBLEM — N18.4 STAGE 4 CHRONIC KIDNEY DISEASE (HCC): Status: ACTIVE | Noted: 2019-10-29

## 2020-01-03 ENCOUNTER — OFFICE VISIT (OUTPATIENT)
Dept: NEPHROLOGY | Facility: CLINIC | Age: 85
End: 2020-01-03
Payer: COMMERCIAL

## 2020-01-03 VITALS
HEART RATE: 68 BPM | BODY MASS INDEX: 35.48 KG/M2 | DIASTOLIC BLOOD PRESSURE: 60 MMHG | SYSTOLIC BLOOD PRESSURE: 128 MMHG | WEIGHT: 176 LBS | RESPIRATION RATE: 18 BRPM | HEIGHT: 59 IN

## 2020-01-03 DIAGNOSIS — I50.33 ACUTE ON CHRONIC DIASTOLIC HEART FAILURE (HCC): ICD-10-CM

## 2020-01-03 DIAGNOSIS — I10 ESSENTIAL HYPERTENSION: Chronic | ICD-10-CM

## 2020-01-03 DIAGNOSIS — N17.8 ACUTE RENAL FAILURE WITH OTHER SPECIFIED PATHOLOGICAL KIDNEY LESION SUPERIMPOSED ON STAGE 3 CHRONIC KIDNEY DISEASE: Primary | ICD-10-CM

## 2020-01-03 DIAGNOSIS — N18.4 STAGE 4 CHRONIC KIDNEY DISEASE (HCC): ICD-10-CM

## 2020-01-03 DIAGNOSIS — I70.1 RENAL ARTERY STENOSIS (HCC): Chronic | ICD-10-CM

## 2020-01-03 DIAGNOSIS — D64.9 CHRONIC ANEMIA: ICD-10-CM

## 2020-01-03 DIAGNOSIS — N18.30 ACUTE RENAL FAILURE WITH OTHER SPECIFIED PATHOLOGICAL KIDNEY LESION SUPERIMPOSED ON STAGE 3 CHRONIC KIDNEY DISEASE: Primary | ICD-10-CM

## 2020-01-03 PROCEDURE — 99214 OFFICE O/P EST MOD 30 MIN: CPT | Performed by: NURSE PRACTITIONER

## 2020-01-03 NOTE — PROGRESS NOTES
OFFICE FOLLOW UP - Nephrology   [facility] 80 y o  female MRN: [id number]       ASSESSMENT and PLAN:  Angel Maravilla was seen today for follow-up and chronic kidney disease  Diagnoses and all orders for this visit:    Acute renal failure with other specified pathological kidney lesion superimposed on stage 3 chronic kidney disease ([facility] )  -resolved  -Suspect secondary to fluctuations in blood pressure variability, relative hypotension, in the setting of acute pulmonary edema while admitted    Stage 4 chronic kidney disease ([facility] )  -Suspect etiology secondary to hypertensive nephrosclerosis, known renal artery stenosis, age-related nephron loss and prior episodes of AK I could be a component  -previously followed Dr Merlos but was last seen in 2015  -after review of the medical records previous baseline creatinine 1 2-1 3 however most recently baseline around 1 5-1 7  -will have patient return in three months with updated blood work and urine studies with for ongoing management  -referred patient to the Kidney Smart class  -     Basic metabolic panel; Future  -     CBC and differential; Future  -     Magnesium; Future  -     Phosphorus; Future  -     PTH, intact; Future  -     Protein / creatinine ratio, urine;  Future  -     Ambulatory referral to ckd education program; Future    Chronic anemia  -hemoglobin improved since discharge to 10 6  -prior iron stores were within normal limits  -will continue trend for now    Essential hypertension  -BP acceptable  -no change in current medications    Renal artery stenosis (HCC)    Acute on chronic diastolic heart failure ([facility] )-  -recent echocardiogram done 10/22/2019:  Reports "EF of 41%  With Grade 2 diastolic dysfunction   Also seen mild-to-moderate pulmonary hypertension  46mmhg   -follows Dr Mary Lou James  -patient examining fairly euvolemic  -currently on torsemide 40 mg b i d   -weight is been stable  -unable to obtain renal artery Doppler while admitted secondary to pressure being too much for her to handle     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  -will check PTH and phosphorus with next office visit                Patient Instructions   All questions asked and answered  The patient has been instructed to call office at [phone] with any questions or concerns  The patient has been instructed to obtain prescribed blood work and urine studies prior to next appointment  Avoid NSAID products to include Motrin, Ibuprofen, Aleve, Naprosyn, or naproxen   Low sodium diet-Education material " Kidney Disease: Eating Less Sodium" given to patient today  Fluid restriction 1500 ml/day  Return in 3 months with Dr Kelsey Forman  Refer to Kidney smart cal            HPI: Jeimy Frederick is a 80 y o  female who is here for Follow-up (HERLINDA) and Chronic Kidney Disease  The patient returns to office for hospital follow from 10/28/2019-11/6/2019 for HERLINDA secondary to fluctuations in blood pressure variability, relative hypotension, in the setting of acute pulmonary edema  She has a PMH of CKD III, CHF with EF 40%, hypertension, renal artery stenosis status post right renal stent, AVR, previous admission with hyponatremia, fall and subdural hematoma  Of note, she rescheduled her prior appointment secondary to inclement weather  Most recent lab work on 12/5/2019 reviewed and reveals creatinine of 1 75, eGFR 26, potassium 4 6, bicarb 27  After review of medical records, most recent baseline creatinine 1 5-1 7  On discussion, she reported taking Zaroxolyn twice since discharge with last one prior to Christmas for weight gain and edema  No issues since  Weight log reviewed and currently stable at 176 pounds  Denies chest pain, shortness of breath, nausea, vomiting, dizziness, lightheadedness, urinary issues, fevers or chills  Patient states she has some swelling but much improved  She reports that she will be having a stress test and echocardiogram in the near future         ROS:   All the systems were reviewed and were negative except as documented on the HPI  Allergies: Duloxetine hcl; Duloxetine hcl; Escitalopram; Pregabalin; Statins; Tramadol; Triprolidine-pse; Anastrozole; Anastrozole; Antihistamines, diphenhydramine-type; Exemestane; Lexapro [escitalopram]; Lyrica [pregabalin]; Metformin;  Oxycodone; Statins; Tramadol; Triprolidine-pseudoephedrine; and Metformin    Medications:   Current Outpatient Medications:     albuterol (2 5 mg/3 mL) 0 083 % nebulizer solution, Take 1 vial (2 5 mg total) by nebulization 3 (three) times a day, Disp: 25 vial, Rfl: 3    albuterol (PROVENTIL HFA,VENTOLIN HFA) 90 mcg/act inhaler, Inhale 2 puffs every 4 (four) hours as needed for wheezing, Disp: , Rfl:     clopidogrel (PLAVIX) 75 mg tablet, Take 1 tablet (75 mg total) by mouth daily, Disp: 30 tablet, Rfl: 3    gabapentin (NEURONTIN) 300 mg capsule, Take 1 capsule (300 mg total) by mouth 3 (three) times a day, Disp: 60 capsule, Rfl: 0    isosorbide mononitrate (IMDUR) 30 mg 24 hr tablet, Take 1 tablet (30 mg total) by mouth daily, Disp: 30 tablet, Rfl: 3    levothyroxine 112 mcg tablet, Take 112 mcg by mouth daily , Disp: , Rfl:     metoprolol tartrate (LOPRESSOR) 25 mg tablet, Take 25 mg by mouth 2 (two) times a day , Disp: , Rfl:     Multiple Vitamins-Calcium (MULTI-DAY/CALCIUM/EXTRA IRON PO), Take 1 tablet by mouth daily, Disp: , Rfl:     Red Yeast Rice Extract (RED YEAST RICE PO), Take 600 mg by mouth daily, Disp: , Rfl:     sertraline (ZOLOFT) 25 mg tablet, 25 mg daily at bedtime , Disp: , Rfl:     torsemide (DEMADEX) 20 mg tablet, Take 2 tablets (40 mg total) by mouth 2 (two) times a day, Disp: 120 tablet, Rfl: 3    metolazone (ZAROXOLYN) 5 mg tablet, Take 1 tablet (5 mg total) by mouth as needed (if gains 2lbs in 2 days ) (Patient not taking: Reported on 12/11/2019), Disp: 15 tablet, Rfl: 0    Omega-3 Fatty Acids (FISH OIL) 1,000 mg, Fish Oil 1,000 mg capsule, Disp: , Rfl:     Past Medical History: Diagnosis Date    Arthritis     Breast cancer [facility]) 2015    Cardiac disease     aortic valve transplant    CHF (congestive heart failure) (HCC)     Compression fracture of body of thoracic vertebra (HCC)     CVA [facility]cerebral vascular accident[facility] )     Diabetes mellitus ([facility] )     Disease of thyroid gland     Fibromyalgia, primary     H/O cervical fracture 01/09/2019    Hyperlipidemia     Hypertension     Neuropathy     AZUL (obstructive sleep apnea) 10/19/2019    Pressure injury of skin     Renal disorder     kidney stent    Stroke [facility])      Past Surgical History:   Procedure Laterality Date    AORTIC VALVE SURGERY      BREAST LUMPECTOMY Right     BREAST LUMPECTOMY Left     BREAST SURGERY      lumpectomy for breast cancer    CATARACT EXTRACTION      CHOLECYSTECTOMY      HYSTERECTOMY  1978    JOINT REPLACEMENT      KIDNEY SURGERY      stent placed for kidney    OTHER SURGICAL HISTORY      abdominal aneurysm surgery    NH ARTHRODESIS POSTERIOR ATLAS-AXIS C1-C2 N/A 5/1/2019    Procedure: C1-C2 lateral mass fixation fusion with possible sublaminar cables;  Surgeon: Liv Castañeda MD;  Location: [facility];  Service: Neurosurgery    REPLACEMENT TOTAL KNEE      left      Family History   Problem Relation Age of Onset    Heart disease Mother     Arthritis Mother     Diabetes Mother     Heart failure Father     Arthritis Father     Other Father         enlargement of prostate     Dementia Father     No Known Problems Daughter     No Known Problems Maternal Grandmother     No Known Problems Maternal Grandfather     No Known Problems Paternal Grandmother     No Known Problems Paternal Grandfather     No Known Problems Maternal Aunt     No Known Problems Maternal Aunt     No Known Problems Maternal Aunt     No Known Problems Maternal Aunt     No Known Problems Paternal Aunt     No Known Problems Paternal Aunt       reports that she has never smoked   She has never used smokeless tobacco  She reports that she drinks alcohol  She reports that she does not use drugs  Physical Exam:   Vitals:    01/03/20 1545 01/03/20 1600   BP: 153/58 128/60   BP Location: Right arm Right arm   Patient Position: Sitting Sitting   Cuff Size: Standard Standard   Pulse: 68    Resp: 18    Weight: 79 8 kg (176 lb)    Height: 4' 11" (1 499 m)      Body mass index is 35 55 kg/m²  General: cooperative, in not acute distress  Eyes: conjunctivae pink, anicteric sclerae  ENT: lips and mucous membranes moist  Neck: supple, no JVD  Chest: clear breath sounds bilateral, no crackles, ronchus or wheezings  CVS:  normal rate, regular rhythm  Abdomen: soft, non-tender, non-distended, normoactive bowel sounds  Back: no CVA tenderness  Extremities:  Plus one lower extremity edema of both legs  Skin: no rash  Neuro: awake, alert, oriented      Lab Results:  Results for orders placed or performed in visit on 35/45/89   Basic metabolic panel   Result Value Ref Range    SODIUM 138     POTASSIUM 4 6     CHLORIDE 100     CO2 27     BUN 66     CREATININE 1 75     Glucose 87     EXTERNAL CALCIUM 9 3     ANION GAP 11     EXTERNAL EGFR 26              Invalid input(s): ALBUMIN        Portions of the record may have been created with voice recognition software  Occasional wrong word or "sound a like" substitutions may have occurred due to the inherent limitations of voice recognition software  Read the chart carefully and recognize, using context, where substitutions have occurred  If you have any questions, please contact the dictating provider

## 2020-01-03 NOTE — PATIENT INSTRUCTIONS
All questions asked and answered  The patient has been instructed to call office at [phone] with any questions or concerns      The patient has been instructed to obtain prescribed blood work and urine studies prior to next appointment  Avoid NSAID products to include Motrin, Ibuprofen, Aleve, Naprosyn, or naproxen   Low sodium diet-Education material " Kidney Disease: Eating Less Sodium" given to patient today  Fluid restriction 1500 ml/day  Return in 3 months with Dr Gloria Herbert  Refer to [address] [place] [address]

## 2020-01-21 DIAGNOSIS — I10 ESSENTIAL HYPERTENSION: Chronic | ICD-10-CM

## 2020-01-21 RX ORDER — ISOSORBIDE MONONITRATE 30 MG/1
30 TABLET, EXTENDED RELEASE ORAL DAILY
Qty: 90 TABLET | Refills: 3 | Status: SHIPPED | OUTPATIENT
Start: 2020-01-21 | End: 2021-01-26

## 2020-02-25 ENCOUNTER — TELEPHONE (OUTPATIENT)
Dept: CARDIOLOGY CLINIC | Facility: CLINIC | Age: 85
End: 2020-02-25

## 2020-02-25 ENCOUNTER — OFFICE VISIT (OUTPATIENT)
Dept: CARDIOLOGY CLINIC | Facility: CLINIC | Age: 85
End: 2020-02-25
Payer: COMMERCIAL

## 2020-02-25 VITALS
HEART RATE: 85 BPM | DIASTOLIC BLOOD PRESSURE: 60 MMHG | WEIGHT: 169.5 LBS | HEIGHT: 59 IN | SYSTOLIC BLOOD PRESSURE: 112 MMHG | BODY MASS INDEX: 34.17 KG/M2 | OXYGEN SATURATION: 80 %

## 2020-02-25 DIAGNOSIS — N18.30 STAGE 3 CHRONIC KIDNEY DISEASE (HCC): ICD-10-CM

## 2020-02-25 DIAGNOSIS — J96.11 CHRONIC RESPIRATORY FAILURE WITH HYPOXIA (HCC): ICD-10-CM

## 2020-02-25 DIAGNOSIS — I50.33 ACUTE ON CHRONIC DIASTOLIC HEART FAILURE (HCC): Primary | ICD-10-CM

## 2020-02-25 DIAGNOSIS — E78.2 MIXED HYPERLIPIDEMIA: ICD-10-CM

## 2020-02-25 PROCEDURE — 99215 OFFICE O/P EST HI 40 MIN: CPT | Performed by: INTERNAL MEDICINE

## 2020-02-25 RX ORDER — NITROGLYCERIN 0.4 MG/1
TABLET SUBLINGUAL
COMMUNITY
Start: 2020-01-29

## 2020-02-25 NOTE — TELEPHONE ENCOUNTER
Received call from pt's daughter with many questions  Pt last seen in Dec, for f/u on March 27  Daughter Eliana Fuller, said Alvena Osler was only wearing 2 L O2 at night, but starting about a week ago,  during the day, her O2 sat was dropping into 70s,80s without the oxygen  She is now wearing it 24-7 and sats are in the high 90s  Daughter discovered low sat when she was sleeping approx 20 hours a day and was difficult to arouse  Pt did not want to go to the ED  She is better since wearing the oxygen all day and night  Torsemide order is for 40 mg bid, but she usually takes 40 in the AM and sometimes only 20 in PM   She does not have any extra edema, and she has actually been losing about a pound a week  I offered an ov with you today, as you have openings this afternoon, but daughter asked your opinion on whether she needs to be seen as it is harder to get her out these days  But if you recommend it, she will bring her in  She also had many questions that you may be able to answer in a phone call if she is not seen  Please advise

## 2020-02-25 NOTE — PROGRESS NOTES
Heart Failure Consult Note - Audelia Mackay 80 y o  female MRN: [id number]    @ Encounter: [id number]      Assessment/Plan:    Patient Active Problem List    Diagnosis Date Noted    Wheezing 11/04/2019    Stage 4 chronic kidney disease ([facility] ) 10/29/2019    Alkalosis 10/29/2019    Acute on chronic respiratory failure with hypoxia ([facility] ) 10/21/2019    Type 2 MI (myocardial infarction) ([facility] ) 10/21/2019    Chronic anemia 10/19/2019    SDH (subdural hematoma) ([facility] ) 10/19/2019    AZUL (obstructive sleep apnea) 10/19/2019    Acute-on-chronic kidney injury ([place] [address] ) 08/16/2019    H/O spinal fusion 07/23/2019    Hyponatremia 01/21/2019    Acute on chronic diastolic heart failure ([place] [address] ) 01/17/2019    Depression 01/17/2019    Essential hypertension 01/09/2019    Controlled type 2 diabetes mellitus with diabetic neuropathy, without long-term current use of insulin ([place] [address] ) 10/24/2017    SLE (systemic lupus erythematosus) ([place] [address] ) 10/24/2017    Vitamin D deficiency 08/22/2017    Renal artery stenosis ([place] [address] ) 02/08/2017    Peripheral polyneuropathy 02/07/2017    Hypothyroidism 09/23/2016    H/O: CVA (cerebrovascular accident) 09/23/2016    History of malignant neoplasm of breast 09/23/2016    History of aortic valve replacement 07/21/2015    RA (rheumatoid arthritis) ([facility] ) 07/21/2015    Hyperlipidemia 10/01/2013    Osteoporosis 10/01/2013    Morbid obesity ([place] [address] ) 10/12/2012     # HFrEF, stage C, NYHA  Etiology: given persistent wall motion abnormalities on echo must rule out ischemia (previously hesitant given CKD), vs HTN vs stress myopathy though typical in motion atypical in that echo in August was showing similar wall motion abnormalities    Weight: 169 lbs down from 184 lbs  When admitted in August recorded at 220 lbs  NT proBNP 11/4/19: 27,700    EKG: SR, low voltage    Echocardiogram 10/22/19  LVEF: 40%, akinesis of distal half of anterior, lateral and inferior walls with akinesis of distal 3rd of septum, akinetic apex- looked like   Takotsubo  Grade 2 DD  LVIDd: 4 7 cm  RV: normal  MR: moderate to severe  PASP:  RVOT:   Other: moderate TR    Echo 8/17/19  LVEF: 45%, same wall motion abnormalities as above- but not nearly as severe    Lab Results   Component Value Date    NTBNP 27,700 (H) 11/04/2019        Neurohormonal Blockade:  --Beta-Blocker: metoprolol tartrate 25 mg BID  --ACEi, ARB or ARNi: CKD4  Imdur 30 mg daily  --Aldosterone Receptor Blocker:  --Diuretic: torsemide 40 mg BID- not on potassium and last K was 4 6    Sudden Cardiac Death Risk Reduction:  --ICD:     Electrical Resynchronization:  --Candidacy for BiV device:    Advanced Therapies (if appropriate): --Inotrope:  --LVAD/Transplant Candidacy:    # CKD3, Cr 1 75 on 12/3  GFR 26  # subdural hematoma  # AZUL- non compliant with treatment  # HTN, hx of DONAVAN stenosis and stenting  # hyperlipidemia  # DM2  # Hx of CVA x 2- mild right leg weakness  # hx of bioprosthetic AVR  # gait dysfunction- not very mobile  Uses walker    Today's Plan:  Labs, CXR- decreased breath sounds on right  Sleep study- was on CPAP in the past but took herself off in the past  Polysomnogram  PFTs  labs    --2g sodium diet   Weights daily    HPI:      79 yo female who is being evaluated for HFrEF  She has a history of bioprosthetic AVR, previous HFpEF, HTN, AZUL not using CPAP but uses oxygen at night, hyperlipidemia, hx of CVA, DM2  Use to see Dr Park Moscoso at [address] [place] [address] [address]  She was in [facility] following subdural hematoma from a fall  She became acutely short of breath requiring BiPap for stabilization  ABG did shows CO2 retention  Previously admitted in August to [facility] for diastolic heart failure  She diuresed around 35 lbs  This was her first admit for heart failure  Discharged at 192 lbs, admitted at 220 lbs  Interim:  Urgent visit today for decreased oxygen saturations at home but states weights are not home   Daughter also said difficult to arouse when sleeping  She has hx of CO2 retention 10/21/19 ABG with CO2 to 81 requiring BiPap, required steroids, and nebs  It was in this setting she had Takotsubo pattern CM    Review of Systems   Constitutional: Negative for activity change, appetite change, fatigue and unexpected weight change  HENT: Negative for congestion and nosebleeds  Eyes: Negative  Respiratory: Positive for shortness of breath  Negative for cough and chest tightness  Low oxygen saturations at home   Cardiovascular: Negative for chest pain, palpitations and leg swelling  Gastrointestinal: Negative for abdominal distention  Endocrine: Negative  Genitourinary: Negative  Musculoskeletal: Negative  Skin: Negative  Neurological: Negative for dizziness, syncope and weakness  Hematological: Negative  Psychiatric/Behavioral: Negative  Past Medical History:   Diagnosis Date    Arthritis     Breast cancer [facility]) 2015    Cardiac disease     aortic valve transplant    [facility]est[facility] failure) [facility]     Compression fracture of body of thoracic vertebra ([facility])     CVA [facility]cerebral vascular accident) [facility]     Diabetes mellitus ([facility] )     Disease of thyroid gland     Fibromyalgia, primary     H/O cervical fracture 01/09/2019    Hyperlipidemia     Hypertension     Neuropathy     AZUL (obstructive sleep apnea) 10/19/2019    Pressure injury of skin     Renal disorder     kidney stent    Stroke ([facility])        Allergies   Allergen Reactions    Duloxetine Hcl Other (See Comments) and Hypertension    Duloxetine Hcl      Cymbalta;  Hemorrhagic stroke listed as reaction    Escitalopram      Other reaction(s): Urinary Retention    Pregabalin      Other reaction(s): Hypertension    Statins Myalgia    Tramadol      Other reaction(s): Hypertension    Triprolidine-Pse Other (See Comments)    Anastrozole Abdominal Pain    Anastrozole     Antihistamines, Diphenhydramine-Type     Exemestane      Aromasin; Muscle pain & cramps    Lexapro [Escitalopram]      Urinary retention    Lyrica [Pregabalin]      hypertension    Metformin      diarrhea    Oxycodone      Pt unsure      Statins      Muscle pain & cramps    Tramadol      hypertension    Triprolidine-Pseudoephedrine     Metformin Diarrhea           Current Outpatient Medications:     albuterol (2 5 mg/3 mL) 0 083 % nebulizer solution, Take 1 vial (2 5 mg total) by nebulization 3 (three) times a day, Disp: 25 vial, Rfl: 3    albuterol (PROVENTIL HFA,VENTOLIN HFA) 90 mcg/act inhaler, Inhale 2 puffs every 4 (four) hours as needed for wheezing, Disp: , Rfl:     clopidogrel (PLAVIX) 75 mg tablet, Take 1 tablet (75 mg total) by mouth daily, Disp: 30 tablet, Rfl: 3    gabapentin (NEURONTIN) 300 mg capsule, Take 1 capsule (300 mg total) by mouth 3 (three) times a day, Disp: 60 capsule, Rfl: 0    isosorbide mononitrate (IMDUR) 30 mg 24 hr tablet, Take 1 tablet (30 mg total) by mouth daily, Disp: 90 tablet, Rfl: 3    levothyroxine 112 mcg tablet, Take 112 mcg by mouth daily , Disp: , Rfl:     metoprolol tartrate (LOPRESSOR) 25 mg tablet, Take 25 mg by mouth 2 (two) times a day , Disp: , Rfl:     Multiple Vitamins-Calcium (MULTI-DAY/CALCIUM/EXTRA IRON PO), Take 1 tablet by mouth daily, Disp: , Rfl:     Red Yeast Rice Extract (RED YEAST RICE PO), Take 600 mg by mouth daily, Disp: , Rfl:     sertraline (ZOLOFT) 25 mg tablet, 25 mg daily at bedtime , Disp: , Rfl:     torsemide (DEMADEX) 20 mg tablet, Take 2 tablets (40 mg total) by mouth 2 (two) times a day (Patient taking differently: Take 40 mg by mouth 2 (two) times a day ), Disp: 120 tablet, Rfl: 3    metolazone (ZAROXOLYN) 5 mg tablet, Take 1 tablet (5 mg total) by mouth as needed (if gains 2lbs in 2 days ) (Patient not taking: Reported on 12/11/2019), Disp: 15 tablet, Rfl: 0    nitroglycerin (NITROSTAT) 0 4 mg SL tablet, , Disp: , Rfl:     Omega-3 Fatty Acids (FISH OIL) 1,000 mg, Fish Oil 1,000 mg capsule, Disp: , Rfl:     Social History     Socioeconomic History    Marital status:       Spouse name: Not on file    Number of children: 3    Years of education: Not on file    Highest education level: Not on file   Occupational History    Not on file   Social Needs    Financial resource strain: Not on file    Food insecurity:     Worry: Not on file     Inability: Not on file    Transportation needs:     Medical: Not on file     Non-medical: Not on file   Tobacco Use    Smoking status: Never Smoker    Smokeless tobacco: Never Used   Substance and Sexual Activity    Alcohol use: Yes     Frequency: Monthly or less     Drinks per session: 1 or 2     Binge frequency: Less than monthly    Drug use: Never    Sexual activity: Not Currently   Lifestyle    Physical activity:     Days per week: Not on file     Minutes per session: Not on file    Stress: Not on file   Relationships    Social connections:     Talks on phone: Not on file     Gets together: Not on file     Attends Jainism service: Not on file     Active member of club or organization: Not on file     Attends meetings of clubs or organizations: Not on file     Relationship status: Not on file    Intimate partner violence:     Fear of current or ex partner: Not on file     Emotionally abused: Not on file     Physically abused: Not on file     Forced sexual activity: Not on file   Other Topics Concern    Not on file   Social History Narrative    ** Merged History Encounter **            Family History   Problem Relation Age of Onset    Heart disease Mother     Arthritis Mother     Diabetes Mother     Heart failure Father     Arthritis Father     Other Father         enlargement of prostate     Dementia Father     No Known Problems Daughter     No Known Problems Maternal Grandmother     No Known Problems Maternal Grandfather     No Known Problems Paternal Grandmother     No Known Problems Paternal Grandfather     No Known Problems Maternal Aunt     No Known Problems Maternal Aunt     No Known Problems Maternal Aunt     No Known Problems Maternal Aunt     No Known Problems Paternal Aunt     No Known Problems Paternal Aunt        Physical Exam:    Vitals: Blood pressure 112/60, pulse 85, height 4' 11" (1 499 m), weight 76 9 kg (169 lb 8 oz), SpO2 (!) 80 %, not currently breastfeeding , Body mass index is 34 23 kg/m² ,   Wt Readings from Last 3 Encounters:   02/25/20 76 9 kg (169 lb 8 oz)   01/03/20 79 8 kg (176 lb)   12/11/19 83 6 kg (184 lb 6 4 oz)       Physical Exam   Constitutional: She is oriented to person, place, and time  She appears well-developed and well-nourished  HENT:   Head: Normocephalic and atraumatic  Eyes: Pupils are equal, round, and reactive to light  Neck: Normal range of motion  No JVD present  Cardiovascular: Normal rate and regular rhythm  No murmur heard  Pulmonary/Chest: Effort normal and breath sounds normal  No respiratory distress  Decreased breath sounds right base   Abdominal: Soft  She exhibits no distension  There is no tenderness  Musculoskeletal: Normal range of motion  She exhibits no edema  Neurological: She is alert and oriented to person, place, and time  Skin: Skin is warm and dry  No rash noted  Psychiatric: She has a normal mood and affect         Labs & Results:    Lab Results   Component Value Date    WBC 5 4 11/16/2019    HGB 10 6 11/16/2019    HCT 31 11/16/2019     11/16/2019     11/16/2019     Lab Results   Component Value Date    SODIUM 138 12/05/2019    K 4 6 12/05/2019     12/05/2019    CO2 27 12/05/2019    BUN 66 12/05/2019    CREATININE 1 75 12/05/2019    GLUC 87 12/05/2019    CALCIUM 9 3 12/05/2019     Lab Results   Component Value Date    NTBNP 27,700 (H) 11/04/2019      Lab Results   Component Value Date    CHOLESTEROL 144 10/23/2019    CHOLESTEROL 204 (H) 08/16/2019    CHOLESTEROL 193 04/09/2019     Lab Results   Component Value Date    HDL 33 (L) 10/23/2019    HDL 56 08/16/2019    HDL 48 04/09/2019     Lab Results   Component Value Date    TRIG 223 (H) 10/23/2019    TRIG 99 08/16/2019    TRIG 397 (H) 04/09/2019     Lab Results   Component Value Date    NONHDLC 111 10/23/2019    [facility] 148 08/16/2019    [facility] 145 04/09/2019       EKG personally reviewed by Ector Pate DO  Counseling / Coordination of Care  Total floor / unit time spent today 40 minutes  Greater than 50% of total time was spent with the patient and / or family counseling and / or coordination of care  A description of the counseling / coordination of care: 25 min  Thank you for the opportunity to participate in the care of this patient  Ector PAK    Director of Advanced Heart Failure Program  Medical Director of [address] [place] [address]

## 2020-02-25 NOTE — TELEPHONE ENCOUNTER
If she is losing weight and not gaining edema I am not sure what is driving her hypoxia  Sounds to be more fluid related in which case she may need an extra metolazone which she has taken  Tough to say without seeing her  I would also suggest sleep study

## 2020-02-26 ENCOUNTER — APPOINTMENT (OUTPATIENT)
Dept: RADIOLOGY | Age: 85
End: 2020-02-26
Payer: COMMERCIAL

## 2020-02-26 ENCOUNTER — TELEPHONE (OUTPATIENT)
Dept: CARDIOLOGY CLINIC | Facility: CLINIC | Age: 85
End: 2020-02-26

## 2020-02-26 DIAGNOSIS — J96.11 CHRONIC RESPIRATORY FAILURE WITH HYPOXIA (HCC): ICD-10-CM

## 2020-02-26 DIAGNOSIS — J90 RECURRENT RIGHT PLEURAL EFFUSION: Primary | ICD-10-CM

## 2020-02-26 DIAGNOSIS — I50.33 ACUTE ON CHRONIC DIASTOLIC HEART FAILURE (HCC): ICD-10-CM

## 2020-02-26 PROCEDURE — 71046 X-RAY EXAM CHEST 2 VIEWS: CPT

## 2020-02-26 NOTE — TELEPHONE ENCOUNTER
I called pts daughter (who is contact) and told of the pleural effusion- left message as no answer  I ordered a thoracentesis on the right

## 2020-02-27 DIAGNOSIS — G47.33 OSA (OBSTRUCTIVE SLEEP APNEA): Chronic | ICD-10-CM

## 2020-02-27 DIAGNOSIS — I50.33 ACUTE ON CHRONIC DIASTOLIC HEART FAILURE (HCC): ICD-10-CM

## 2020-02-27 DIAGNOSIS — R06.2 WHEEZING: ICD-10-CM

## 2020-02-27 RX ORDER — ALBUTEROL SULFATE 2.5 MG/3ML
2.5 SOLUTION RESPIRATORY (INHALATION)
Qty: 25 VIAL | Refills: 3 | Status: SHIPPED | OUTPATIENT
Start: 2020-02-27

## 2020-02-28 ENCOUNTER — HOSPITAL ENCOUNTER (OUTPATIENT)
Dept: RADIOLOGY | Facility: HOSPITAL | Age: 85
Discharge: HOME | End: 2020-02-28
Attending: INTERNAL MEDICINE | Admitting: RADIOLOGY
Payer: COMMERCIAL

## 2020-02-28 VITALS
HEART RATE: 58 BPM | OXYGEN SATURATION: 99 % | DIASTOLIC BLOOD PRESSURE: 53 MMHG | RESPIRATION RATE: 16 BRPM | SYSTOLIC BLOOD PRESSURE: 107 MMHG

## 2020-02-28 DIAGNOSIS — J90 RECURRENT RIGHT PLEURAL EFFUSION: ICD-10-CM

## 2020-02-28 LAB
EOSINOPHIL NFR FLD MANUAL: 7 %
GLUCOSE FLD-MCNC: 109 MG/DL
HISTIOCYTES NFR FLD: 2 %
LDH FLD L TO P-CCNC: 105 U/L
LYMPHOCYTES NFR BLD AUTO: 76 %
MONO+MESO NFR FLD MANUAL: 4 %
MONOCYTES NFR BLD AUTO: 5 %
NEUTS SEG NFR BLD AUTO: 6 %
PH BODY FLUID: 7.6
PROT FLD-MCNC: 4.4 G/DL
SITE: NORMAL
TOTAL CELLS COUNTED SPEC: 100
WBC # FLD MANUAL: 607 /UL

## 2020-02-28 PROCEDURE — 88112 CYTOPATH CELL ENHANCE TECH: CPT | Performed by: PATHOLOGY

## 2020-02-28 PROCEDURE — 84157 ASSAY OF PROTEIN OTHER: CPT | Performed by: INTERNAL MEDICINE

## 2020-02-28 PROCEDURE — 32555 ASPIRATE PLEURA W/ IMAGING: CPT | Performed by: PHYSICIAN ASSISTANT

## 2020-02-28 PROCEDURE — 32555 ASPIRATE PLEURA W/ IMAGING: CPT

## 2020-02-28 PROCEDURE — 87205 SMEAR GRAM STAIN: CPT | Performed by: INTERNAL MEDICINE

## 2020-02-28 PROCEDURE — 83615 LACTATE (LD) (LDH) ENZYME: CPT | Performed by: INTERNAL MEDICINE

## 2020-02-28 PROCEDURE — 83986 ASSAY PH BODY FLUID NOS: CPT | Performed by: INTERNAL MEDICINE

## 2020-02-28 PROCEDURE — 88305 TISSUE EXAM BY PATHOLOGIST: CPT | Performed by: PATHOLOGY

## 2020-02-28 PROCEDURE — 89051 BODY FLUID CELL COUNT: CPT | Performed by: INTERNAL MEDICINE

## 2020-02-28 PROCEDURE — 82945 GLUCOSE OTHER FLUID: CPT | Performed by: INTERNAL MEDICINE

## 2020-02-28 PROCEDURE — 87070 CULTURE OTHR SPECIMN AEROBIC: CPT | Performed by: INTERNAL MEDICINE

## 2020-02-28 NOTE — DISCHARGE INSTRUCTIONS
Thoracentesis   WHAT YOU NEED TO KNOW:   A thoracentesis is a procedure to remove extra fluid or air from between your lungs and your inner chest wall  Air or fluid buildup may make it hard for you to breathe  A thoracentesis allows your lungs to expand fully so you can breathe more easily  DISCHARGE INSTRUCTIONS:     Small amount of shoulder pain and bloody sputum is normal after a Thoracentesis  Rest:  Rest when you feel it is needed  Slowly start to do more each day  Return to your daily activities as directed  Resume your normal diet  Small sips of flat soda will help mild nausea  Do not smoke: If you smoke, it is never too late to quit  Ask for information about how to stop smoking if you need help  Contact Interventional Radiology at [phone] [place] PATIENTS: Contact Interventional Radiology at [phone]) Pam Amezcua PATIENTS: Contact Interventional Radiology at [phone]) if:   · You have a fever  · Your puncture site is red, warm, swollen, or draining pus  · You have questions or concerns about your procedure, medicine, or care  Seek care immediately or call 911 if:   · Severe chest pain with inspiration and shortness of breath    · Large amounts of blood in your sputum    Follow up with your healthcare provider as directed

## 2020-02-28 NOTE — BRIEF OP NOTE (RAD/CATH)
Right IR THORACENTESIS Procedure Note    PATIENT NAME: Josiane Bailey  : 1934  MRN: [id number]    Pre-op Diagnosis:   CHF  1  Recurrent right pleural effusion      Post-op Diagnosis:   CHF  1  Recurrent right pleural effusion      Provider:  Jennifer Alvarez PA-C    Supervising physician:  Dr Jeannette Owens    No qualified resident was available, Resident is only observing    Estimated Blood Loss: none    Findings: 1L clear yellow fluid drained   Right thoracentesis    Specimens: labs sent due to first time thoracentesis    Complications:  None immediate    Anesthesia: Local    Priscilla Llamas PA-C     Date: 2020  Time: 1:56 PM

## 2020-02-28 NOTE — SEDATION DOCUMENTATION
Right side thoracentesis completed without complication  1L clear yellow fluid drained  First time thoracentesis labs ordered and sent    Right side dressing CDI  Pt tolerated procedure well   Granddaughter left with AVS

## 2020-03-02 LAB
BACTERIA SPEC BFLD CULT: NO GROWTH
GRAM STN SPEC: NORMAL

## 2020-03-10 DIAGNOSIS — I50.33 ACUTE ON CHRONIC DIASTOLIC HEART FAILURE (HCC): ICD-10-CM

## 2020-03-10 RX ORDER — TORSEMIDE 20 MG/1
40 TABLET ORAL 2 TIMES DAILY
Qty: 180 TABLET | Refills: 3 | Status: SHIPPED | OUTPATIENT
Start: 2020-03-10 | End: 2020-09-22

## 2020-03-12 ENCOUNTER — TELEPHONE (OUTPATIENT)
Dept: CARDIOLOGY CLINIC | Facility: CLINIC | Age: 85
End: 2020-03-12

## 2020-03-12 NOTE — TELEPHONE ENCOUNTER
Pts daughter called and L/M inquiring about keeping Chely's appt for tomorrow for an ECHO and Stress test L/M for daughter Advising of CDC recommendations and if she would like to reschedule please call office to cancel

## 2020-03-13 ENCOUNTER — HOSPITAL ENCOUNTER (OUTPATIENT)
Dept: NON INVASIVE DIAGNOSTICS | Facility: CLINIC | Age: 85
End: 2020-03-13
Payer: COMMERCIAL

## 2020-03-13 ENCOUNTER — HOSPITAL ENCOUNTER (OUTPATIENT)
Dept: NON INVASIVE DIAGNOSTICS | Facility: CLINIC | Age: 85
Discharge: HOME | End: 2020-03-13
Payer: COMMERCIAL

## 2020-03-13 ENCOUNTER — TELEPHONE (OUTPATIENT)
Dept: CARDIOLOGY CLINIC | Facility: CLINIC | Age: 85
End: 2020-03-13

## 2020-03-13 DIAGNOSIS — I50.22 CHRONIC SYSTOLIC HEART FAILURE (HCC): ICD-10-CM

## 2020-03-13 DIAGNOSIS — G47.33 OSA (OBSTRUCTIVE SLEEP APNEA): ICD-10-CM

## 2020-03-13 PROCEDURE — 93325 DOPPLER ECHO COLOR FLOW MAPG: CPT | Performed by: INTERNAL MEDICINE

## 2020-03-13 PROCEDURE — 93321 DOPPLER ECHO F-UP/LMTD STD: CPT | Performed by: INTERNAL MEDICINE

## 2020-03-13 PROCEDURE — 93308 TTE F-UP OR LMTD: CPT

## 2020-03-13 PROCEDURE — 93308 TTE F-UP OR LMTD: CPT | Performed by: INTERNAL MEDICINE

## 2020-03-13 RX ADMIN — PERFLUTREN 0.8 ML/MIN: 6.52 INJECTION, SUSPENSION INTRAVENOUS at 11:35

## 2020-03-13 NOTE — TELEPHONE ENCOUNTER
Daughter Candice Garcia stated her mother is unable to complete nuc stress, but did complete the echo today

## 2020-03-20 ENCOUNTER — TELEPHONE (OUTPATIENT)
Dept: CARDIOLOGY CLINIC | Facility: CLINIC | Age: 85
End: 2020-03-20

## 2020-03-20 NOTE — TELEPHONE ENCOUNTER
Tanvi Mccurdy has a phone visit scheduled with you on 3/27  Daughter Maximilian Faith called, said she noticed that she is sleeping a lot more, is a little bit sob with exertion  This is not new but said after her thoracentesis, it was better than it is now  She has no weight gain, no edema, but daughter worried that she won't know if her "lungs have fluid again and need to be tapped"  Wears O2 almost all the time   Sat in 90s with it on, but without, when she gets up to walk, it is sometimes in the 70s, so as a result, wearing it most of the time  Do you want to move phone visit up?

## 2020-03-23 ENCOUNTER — TELEPHONE (OUTPATIENT)
Dept: SLEEP CENTER | Facility: CLINIC | Age: 85
End: 2020-03-23

## 2020-03-23 NOTE — TELEPHONE ENCOUNTER
----- Message from Bakari Deshpande MD sent at 3/20/2020  5:12 PM EDT -----  yes  ----- Message -----  From: Andre Joseph  Sent: 2/26/2020  10:06 AM EDT  To: Sleep Medicine Juan Roche, #    PLEASE REVIEW FOR APPROVAL OR DENIAL AND WHY

## 2020-03-26 NOTE — PROGRESS NOTES
Virtual Regular Visit    Problem List Items Addressed This Visit     None               Reason for visit is follow up    Encounter provider Sobeida Weathers DO    Provider located at [place] [address]  [address] [address]      Recent Visits  Date Type Provider Dept   03/20/20 [address] ,4[address] Floor Unit, RN [facility] [place]   Showing recent visits within past 7 days and meeting all other requirements     Future Appointments  No visits were found meeting these conditions  Showing future appointments within next 150 days and meeting all other requirements        After connecting through [employer], the patient was identified by name and date of birth  Diana Price was informed that this is a telemedicine visit and that the visit is being conducted through telephone which may not be secure and therefore, might not be HIPAA-compliant  My office door was closed  No one else was in the room  She acknowledged consent and understanding of privacy and security of the video platform  The patient has agreed to participate and understands they can discontinue the visit at any time      # Chronic HFrEF, stage C, NYHA III  Etiology: given persistent wall motion abnormalities on echo must rule out ischemia (previously hesitant given CKD), vs HTN vs stress myopathy though typical in motion atypical in that echo in August was showing similar wall motion abnormalities     Weight: 169 lbs down from 184 lbs  When admitted in August recorded at 220 lbs  NT proBNP 3/4/20: 16,419  11/4/19: 27,700    CXR 2/26: moderate right sided pleural effusion  Thoracentesis 2/28/20: 1 liter fluid from right side     EKG: SR, low voltage    Echo 3/13/20: EF around 30%, dyskinesis of mid apical anterior and mid anterolateral walls  PASP: 50 mmHg  MR: Severe      Echocardiogram 10/22/19  LVEF: 40%, akinesis of distal half of anterior, lateral and inferior walls with akinesis of distal 3rd of septum, akinetic apex- looked like   Takotsubo  Grade 2 DD  LVIDd: 4 7 cm  RV: normal  MR: moderate to severe  PASP:  RVOT:   Other: moderate TR     Echo 8/17/19  LVEF: 45%, same wall motion abnormalities as above- but not nearly as severe           Lab Results   Component Value Date     NTBNP 27,700 (H) 11/04/2019         Neurohormonal Blockade:  --Beta-Blocker: metoprolol tartrate 25 mg BID  --ACEi, ARB or ARNi: CKD4  Imdur 30 mg daily  --Aldosterone Receptor Blocker:  --Diuretic: torsemide 40 mg BID- not on potassium and last K was 4 6     Sudden Cardiac Death Risk Reduction:  --ICD:      Electrical Resynchronization:  --Candidacy for BiV device:     Advanced Therapies (if appropriate): --Inotrope:  --LVAD/Transplant Candidacy:     # CKD3, Cr 1 65 on 3/4/20  GFR 26  # subdural hematoma  # AZUL- non compliant with treatment  # HTN, hx of DONAVAN stenosis and stenting  # hyperlipidemia  # DM2  # Hx of CVA x 2- mild right leg weakness  # hx of bioprosthetic AVR  # gait dysfunction- not very mobile  Uses walker     Today's Plan:  Needs improved diuresis  [facility] advisable  --2g sodium diet   Weights daily     HPI:      79 yo female who is being evaluated for HFrEF  She has a history of bioprosthetic AVR, previous HFpEF, HTN, AZUL not using CPAP but uses oxygen at night, hyperlipidemia, hx of CVA, DM2  Use to see Dr Samantha Hammond at [facility]  She was in [facility] following subdural hematoma from a fall  She became acutely short of breath requiring BiPap for stabilization  ABG did shows CO2 retention  Previously admitted in August to [facility] for diastolic heart failure  She diuresed around 35 lbs  This was her first admit for heart failure  Discharged at 192 lbs, admitted at 220 lbs     Last visit 2/25/20: Urgent visit today for decreased oxygen saturations at home but states weights are not home   Daughter also said difficult to arouse when sleeping  She has hx of CO2 retention 10/21/19 ABG with CO2 to 81 requiring BiPap, required steroids, and nebs  It was in this setting she had Takotsubo pattern CM    Interim:  Sleep study not done or PFTs  CXR 2/26: moderate right sided pleural effusion  Thoracentesis 2/28/20: 1 liter fluid from right side    Could not do nuclear stress test    Echo 3/13/20: EF around 30%, dyskinesis of mid apical anterior and mid anterolateral walls  PASP: 50 mmHg  MR: Severe     Currently she is actually feeling pretty good  Weight: 167 lbs      Past Medical History:   Diagnosis Date    Arthritis     Breast cancer ([facility] ) 2015    Cardiac disease     aortic valve transplant    [facility]     Compression fracture of body of thoracic vertebra ([facility])     CVA (cerebral vascular accident) ([facility] )     Diabetes mellitus ([facility] )     Disease of thyroid gland     Fibromyalgia, primary     H/O cervical fracture 01/09/2019    Hyperlipidemia     Hypertension     Neuropathy     AZUL (obstructive sleep apnea) 10/19/2019    Pressure injury of skin     Renal disorder     kidney stent    Stroke [facility])        Past Surgical History:   Procedure Laterality Date    AORTIC VALVE SURGERY      BREAST LUMPECTOMY Right     BREAST LUMPECTOMY Left     BREAST SURGERY      lumpectomy for breast cancer    CATARACT EXTRACTION      CHOLECYSTECTOMY      HYSTERECTOMY  1978    IR THORACENTESIS  2/28/2020    JOINT REPLACEMENT      KIDNEY SURGERY      stent placed for kidney    OTHER SURGICAL HISTORY      abdominal aneurysm surgery    [place] ARTHRODESIS POSTERIOR ATLAS-AXIS C1-C2 N/A 5/1/2019    Procedure: C1-C2 lateral mass fixation fusion with possible sublaminar cables;  Surgeon: Niurka Morales MD;  [facility];  Service: Neurosurgery    REPLACEMENT TOTAL KNEE      left        Current Outpatient Medications   Medication Sig Dispense Refill    albuterol (2 5 mg/3 mL) 0 083 % nebulizer solution Take 1 vial (2 5 mg total) by nebulization 3 (three) times a day 25 vial 3    albuterol (PROVENTIL HFA,VENTOLIN HFA) 90 mcg/act inhaler Inhale 2 puffs every 4 (four) hours as needed for wheezing      clopidogrel (PLAVIX) 75 mg tablet Take 1 tablet (75 mg total) by mouth daily 30 tablet 3    gabapentin (NEURONTIN) 300 mg capsule Take 1 capsule (300 mg total) by mouth 3 (three) times a day 60 capsule 0    isosorbide mononitrate (IMDUR) 30 mg 24 hr tablet Take 1 tablet (30 mg total) by mouth daily 90 tablet 3    levothyroxine 112 mcg tablet Take 112 mcg by mouth daily       metolazone (ZAROXOLYN) 5 mg tablet Take 1 tablet (5 mg total) by mouth as needed (if gains 2lbs in 2 days ) (Patient not taking: Reported on 12/11/2019) 15 tablet 0    metoprolol tartrate (LOPRESSOR) 25 mg tablet Take 25 mg by mouth 2 (two) times a day       Multiple Vitamins-Calcium (MULTI-DAY/CALCIUM/EXTRA IRON PO) Take 1 tablet by mouth daily      nitroglycerin (NITROSTAT) 0 4 mg SL tablet       Omega-3 Fatty Acids (FISH OIL) 1,000 mg Fish Oil 1,000 mg capsule      Red Yeast Rice Extract (RED YEAST RICE PO) Take 600 mg by mouth daily      sertraline (ZOLOFT) 25 mg tablet 25 mg daily at bedtime       torsemide (DEMADEX) 20 mg tablet Take 2 tablets (40 mg total) by mouth 2 (two) times a day 180 tablet 3     No current facility-administered medications for this visit  Allergies   Allergen Reactions    Duloxetine Hcl Other (See Comments) and Hypertension    Duloxetine Hcl      Cymbalta;  Hemorrhagic stroke listed as reaction    Escitalopram      Other reaction(s): Urinary Retention    Pregabalin      Other reaction(s): Hypertension    Statins Myalgia    Tramadol      Other reaction(s): Hypertension    Triprolidine-Pse Other (See Comments)    Anastrozole Abdominal Pain    Anastrozole     Antihistamines, Diphenhydramine-Type     Exemestane      Aromasin; Muscle pain & cramps    Lexapro [Escitalopram]      Urinary retention    Lyrica [Pregabalin]      hypertension    Metformin      diarrhea    Oxycodone Pt unsure      Statins      Muscle pain & cramps    Tramadol      hypertension    Triprolidine-Pseudoephedrine     Metformin Diarrhea       Review of Systems   Constitutional: Negative for activity change, appetite change, fatigue and unexpected weight change  HENT: Negative for congestion and nosebleeds  Eyes: Negative  Respiratory: Positive for shortness of breath  Negative for cough and chest tightness  Cardiovascular: Negative for chest pain, palpitations and leg swelling  Gastrointestinal: Negative for abdominal distention  Endocrine: Negative  Genitourinary: Negative  Musculoskeletal: Negative  Skin: Negative  Neurological: Negative for dizziness, syncope and weakness  Hematological: Negative  Psychiatric/Behavioral: Negative  I spent 20 minutes with the patient during this visit

## 2020-03-27 ENCOUNTER — TELEMEDICINE (OUTPATIENT)
Dept: CARDIOLOGY CLINIC | Facility: CLINIC | Age: 85
End: 2020-03-27
Payer: COMMERCIAL

## 2020-03-27 DIAGNOSIS — I10 HTN (HYPERTENSION), BENIGN: ICD-10-CM

## 2020-03-27 DIAGNOSIS — I50.22 CHRONIC SYSTOLIC CHF (CONGESTIVE HEART FAILURE), NYHA CLASS 3 (HCC): ICD-10-CM

## 2020-03-27 DIAGNOSIS — E78.00 PURE HYPERCHOLESTEROLEMIA: Primary | ICD-10-CM

## 2020-03-27 DIAGNOSIS — E78.5 HYPERLIPIDEMIA LDL GOAL <70: ICD-10-CM

## 2020-03-27 PROCEDURE — 99214 OFFICE O/P EST MOD 30 MIN: CPT | Performed by: INTERNAL MEDICINE

## 2020-04-26 NOTE — PROGRESS NOTES
Virtual Brief Visit    Problem List Items Addressed This Visit     None                Reason for visit is follow up    Encounter provider Margie Sandoval DO    Provider located at [address] [place] [address] [address]    Recent Visits  No visits were found meeting these conditions  Showing recent visits within past 7 days and meeting all other requirements     Future Appointments  Date Type Provider Dept   04/28/20 Telemedicine Margie Sandoval DO [facility]   Showing future appointments within next 150 days and meeting all other requirements        After connecting through telephone, the patient was identified by name and date of birth  Cecy Petit was informed that this is a telemedicine visit and that the visit is being conducted through telephone  My office door was closed  No one else was in the room  She acknowledged consent and understanding of privacy and security of the video platform  The patient has agreed to participate and understands they can discontinue the visit at any time  Patient is aware this is a billable service       # Chronic HFrEF, stage C, NYHA III  Etiology: given persistent wall motion abnormalities on echo must rule out ischemia (previously hesitant given CKD), vs HTN vs stress myopathy though typical in motion atypical in that echo in August was showing similar wall motion abnormalities     Weight: 167 lbs down from 184 lbs  When admitted in August recorded at 220 lbs  NT proBNP 3/4/20: 16,419  11/4/19: 27,700     CXR 2/26: moderate right sided pleural effusion  Thoracentesis 2/28/20: 1 liter fluid from right side     EKG: SR, low voltage     Echo 3/13/20: EF around 30%, dyskinesis of mid apical anterior and mid anterolateral walls  PASP: 50 mmHg  MR: Severe      Echocardiogram 10/22/19  LVEF: 40%, akinesis of distal half of anterior, lateral and inferior walls with akinesis of distal 3rd of septum, akinetic apex- looked like   Takotsubo  Grade 2 DD  LVIDd: 4 7 cm  RV: normal  MR: moderate to severe  PASP:  RVOT:   Other: moderate TR     Echo 8/17/19  LVEF: 45%, same wall motion abnormalities as above- but not nearly as severe               Lab Results   Component Value Date     NTBNP 27,700 (H) 11/04/2019         Neurohormonal Blockade:  --Beta-Blocker: metoprolol tartrate 25 mg BID  --ACEi, ARB or ARNi: CKD4  Imdur 30 mg daily  --Aldosterone Receptor Blocker:  --Diuretic: torsemide 40 mg BID- not on potassium and last K was 4 6     Sudden Cardiac Death Risk Reduction:  --ICD:      Electrical Resynchronization:  --Candidacy for BiV device:     Advanced Therapies (if appropriate): --Inotrope:  --LVAD/Transplant Candidacy:     # right sided pleural effusion 2/26 CXR  Right thoracentesis 2/28: 1 liter  # CKD3, Cr 1 65 on 3/4/20  GFR 26  # subdural hematoma  # AZUL- non compliant with treatment  # HTN, hx of DONAVAN stenosis and stenting  # hyperlipidemia  # DM2  # Hx of CVA x 2- mild right leg weakness  # hx of bioprosthetic AVR  # gait dysfunction- not very mobile  Uses walker     Today's Plan:  Needs improved diuresis  [facility] advisable  --2g sodium diet   Weights daily     HPI:      81 yo female who is being evaluated for HFrEF  She has a history of bioprosthetic AVR, previous HFpEF, HTN, AZUL not using CPAP but uses oxygen at night, hyperlipidemia, hx of CVA, DM2  Use to see Dr Leno Silverio at [facility]  She was in [facility] following subdural hematoma from a fall  She became acutely short of breath requiring BiPap for stabilization  ABG did shows CO2 retention       Previously admitted in August to [facility] for diastolic heart failure  She diuresed around 35 lbs  This was her first admit for heart failure  Discharged at 192 lbs, admitted at 220 lbs     Last visit 2/25/20: Urgent visit today for decreased oxygen saturations at home but states weights are not home   Daughter also said difficult to arouse when sleeping  She has hx of CO2 retention 10/21/19 ABG with CO2 to 81 requiring BiPap, required steroids, and nebs  It was in this setting she had Takotsubo pattern CM     Interim:        Past Medical History:   Diagnosis Date   • Arthritis    • Breast cancer ([facility] ) 2015   • Cardiac disease     aortic valve transplant   • [facility]congest[facility] failure) ([facility])    • Compression fracture of body of thoracic vertebra ([facility])    • CVA (cerebral vascular accident) ([facility] )    • Diabetes mellitus ([facility] )    • Disease of thyroid gland    • Fibromyalgia, primary    • H/O cervical fracture 01/09/2019   • Hyperlipidemia    • Hypertension    • Neuropathy    • AZUL (obstructive sleep apnea) 10/19/2019   • Pressure injury of skin    • Renal disorder     kidney stent   • Stroke [facility])        Past Surgical History:   Procedure Laterality Date   • AORTIC VALVE SURGERY     • BREAST LUMPECTOMY Right    • BREAST LUMPECTOMY Left    • BREAST SURGERY      lumpectomy for breast cancer   • CATARACT EXTRACTION     • CHOLECYSTECTOMY     • HYSTERECTOMY  1978   • IR THORACENTESIS  2/28/2020   • JOINT REPLACEMENT     • KIDNEY SURGERY      stent placed for kidney   • OTHER SURGICAL HISTORY      abdominal aneurysm surgery   • SD ARTHRODESIS POSTERIOR ATLAS-AXIS C1-C2 N/A 5/1/2019    Procedure: C1-C2 lateral mass fixation fusion with possible sublaminar cables;  Surgeon: Yahir Hinkle MD;  [facility];  Service: Neurosurgery   • REPLACEMENT TOTAL KNEE      left        Current Outpatient Medications   Medication Sig Dispense Refill   • albuterol (2 5 mg/3 mL) 0 083 % nebulizer solution Take 1 vial (2 5 mg total) by nebulization 3 (three) times a day 25 vial 3   • albuterol (PROVENTIL HFA,VENTOLIN HFA) 90 mcg/act inhaler Inhale 2 puffs every 4 (four) hours as needed for wheezing     • clopidogrel (PLAVIX) 75 mg tablet Take 1 tablet (75 mg total) by mouth daily 30 tablet 3   • gabapentin (NEURONTIN) 300 mg capsule Take 1 capsule (300 mg total) by mouth 3 (three) times a day 60 capsule 0   • isosorbide mononitrate (IMDUR) 30 mg 24 hr tablet Take 1 tablet (30 mg total) by mouth daily 90 tablet 3   • levothyroxine 112 mcg tablet Take 112 mcg by mouth daily      • metolazone (ZAROXOLYN) 5 mg tablet Take 1 tablet (5 mg total) by mouth as needed (if gains 2lbs in 2 days ) (Patient not taking: Reported on 12/11/2019) 15 tablet 0   • metoprolol tartrate (LOPRESSOR) 25 mg tablet Take 25 mg by mouth 2 (two) times a day      • Multiple Vitamins-Calcium (MULTI-DAY/CALCIUM/EXTRA IRON PO) Take 1 tablet by mouth daily     • nitroglycerin (NITROSTAT) 0 4 mg SL tablet      • Omega-3 Fatty Acids (FISH OIL) 1,000 mg Fish Oil 1,000 mg capsule     • Red Yeast Rice Extract (RED YEAST RICE PO) Take 600 mg by mouth daily     • sertraline (ZOLOFT) 25 mg tablet 25 mg daily at bedtime      • torsemide (DEMADEX) 20 mg tablet Take 2 tablets (40 mg total) by mouth 2 (two) times a day 180 tablet 3     No current facility-administered medications for this visit  Allergies   Allergen Reactions   • Duloxetine Hcl Other (See Comments) and Hypertension   • Duloxetine Hcl      Cymbalta; Hemorrhagic stroke listed as reaction   • Escitalopram      Other reaction(s): Urinary Retention   • Pregabalin      Other reaction(s): Hypertension   • Statins Myalgia   • Tramadol      Other reaction(s): Hypertension   • Triprolidine-Pse Other (See Comments)   • Anastrozole Abdominal Pain   • Anastrozole    • Antihistamines, Diphenhydramine-Type    • Exemestane      Aromasin; Muscle pain & cramps   • Lexapro [Escitalopram]      Urinary retention   • Lyrica [Pregabalin]      hypertension   • Metformin      diarrhea   • Oxycodone      Pt unsure     • Statins      Muscle pain & cramps   • Tramadol      hypertension   • Triprolidine-Pseudoephedrine    • Metformin Diarrhea       Review of Systems   Constitutional: Negative for activity change, appetite change, fatigue and unexpected weight change     HENT: Negative for congestion and nosebleeds  Eyes: Negative  Respiratory: Negative for cough, chest tightness and shortness of breath  Cardiovascular: Negative for chest pain, palpitations and leg swelling  Gastrointestinal: Negative for abdominal distention  Endocrine: Negative  Genitourinary: Negative  Musculoskeletal: Negative  Skin: Negative  Neurological: Negative for dizziness, syncope and weakness  Hematological: Negative  Psychiatric/Behavioral: Negative  As a result of this visit, I have referred the patient for further respiratory evaluation  No    I have spent 20 minutes with Patient  today in which greater than 50% of this time was spent in counseling/coordination of care regarding Diagnostic results, Prognosis, Risks and benefits of tx options, Risk factor reductions and Impressions

## 2020-04-28 ENCOUNTER — TELEMEDICINE (OUTPATIENT)
Dept: CARDIOLOGY CLINIC | Facility: CLINIC | Age: 85
End: 2020-04-28

## 2020-04-28 ENCOUNTER — TELEPHONE (OUTPATIENT)
Dept: CARDIOLOGY CLINIC | Facility: CLINIC | Age: 85
End: 2020-04-28

## 2020-04-28 DIAGNOSIS — I50.22 CHRONIC SYSTOLIC CHF (CONGESTIVE HEART FAILURE), NYHA CLASS 2 (HCC): Primary | ICD-10-CM

## 2020-04-28 DIAGNOSIS — E78.5 HYPERLIPIDEMIA: ICD-10-CM

## 2020-04-28 DIAGNOSIS — G47.33 OSA (OBSTRUCTIVE SLEEP APNEA): ICD-10-CM

## 2020-04-28 DIAGNOSIS — I10 HTN (HYPERTENSION), BENIGN: ICD-10-CM

## 2020-05-18 ENCOUNTER — TELEPHONE (OUTPATIENT)
Dept: SURGICAL ONCOLOGY | Facility: CLINIC | Age: 85
End: 2020-05-18

## 2020-05-19 ENCOUNTER — TELEMEDICINE (OUTPATIENT)
Dept: SURGICAL ONCOLOGY | Facility: CLINIC | Age: 85
End: 2020-05-19
Payer: COMMERCIAL

## 2020-05-19 DIAGNOSIS — Z08 ENCOUNTER FOR FOLLOW-UP EXAMINATION AFTER COMPLETED TREATMENT FOR MALIGNANT NEOPLASM: Primary | ICD-10-CM

## 2020-05-19 DIAGNOSIS — Z85.3 HISTORY OF MALIGNANT NEOPLASM OF BREAST: Chronic | ICD-10-CM

## 2020-05-19 PROCEDURE — 99213 OFFICE O/P EST LOW 20 MIN: CPT | Performed by: NURSE PRACTITIONER

## 2020-05-20 ENCOUNTER — TELEPHONE (OUTPATIENT)
Dept: NEPHROLOGY | Facility: CLINIC | Age: 85
End: 2020-05-20

## 2020-05-20 DIAGNOSIS — Z86.73 H/O: CVA (CEREBROVASCULAR ACCIDENT): Chronic | ICD-10-CM

## 2020-05-20 RX ORDER — CLOPIDOGREL BISULFATE 75 MG/1
75 TABLET ORAL DAILY
Qty: 90 TABLET | Refills: 3 | Status: SHIPPED | OUTPATIENT
Start: 2020-05-20 | End: 2021-05-15

## 2020-05-29 ENCOUNTER — TELEMEDICINE (OUTPATIENT)
Dept: NEPHROLOGY | Facility: CLINIC | Age: 85
End: 2020-05-29
Payer: COMMERCIAL

## 2020-05-29 VITALS — BODY MASS INDEX: 33.12 KG/M2 | WEIGHT: 164 LBS

## 2020-05-29 DIAGNOSIS — I12.9 NEPHROSCLEROSIS ARTERIOLAR, STAGE 1-4 OR UNSPECIFIED CHRONIC KIDNEY DISEASE: Primary | ICD-10-CM

## 2020-05-29 PROBLEM — N17.9 ACUTE-ON-CHRONIC KIDNEY INJURY (HCC): Status: RESOLVED | Noted: 2019-08-16 | Resolved: 2020-05-29

## 2020-05-29 PROBLEM — N18.4 STAGE 4 CHRONIC KIDNEY DISEASE (HCC): Status: RESOLVED | Noted: 2019-10-29 | Resolved: 2020-05-29

## 2020-05-29 PROBLEM — N18.9 ACUTE-ON-CHRONIC KIDNEY INJURY (HCC): Status: RESOLVED | Noted: 2019-08-16 | Resolved: 2020-05-29

## 2020-05-29 PROCEDURE — 99214 OFFICE O/P EST MOD 30 MIN: CPT | Performed by: INTERNAL MEDICINE

## 2020-06-09 ENCOUNTER — TELEMEDICINE (OUTPATIENT)
Dept: CARDIOLOGY CLINIC | Facility: CLINIC | Age: 85
End: 2020-06-09
Payer: COMMERCIAL

## 2020-06-09 VITALS — WEIGHT: 162 LBS | BODY MASS INDEX: 32.72 KG/M2

## 2020-06-09 DIAGNOSIS — E78.00 PURE HYPERCHOLESTEROLEMIA: Primary | ICD-10-CM

## 2020-06-09 DIAGNOSIS — I25.10 CORONARY ARTERY DISEASE INVOLVING NATIVE CORONARY ARTERY WITHOUT ANGINA PECTORIS, UNSPECIFIED WHETHER NATIVE OR TRANSPLANTED HEART: ICD-10-CM

## 2020-06-09 DIAGNOSIS — I50.32 CHRONIC DIASTOLIC CHF (CONGESTIVE HEART FAILURE), NYHA CLASS 2 (HCC): ICD-10-CM

## 2020-06-09 PROCEDURE — 99442 PR PHYS/QHP TELEPHONE EVALUATION 11-20 MIN: CPT | Performed by: INTERNAL MEDICINE

## 2020-08-28 ENCOUNTER — TELEPHONE (OUTPATIENT)
Dept: NEPHROLOGY | Facility: CLINIC | Age: 85
End: 2020-08-28

## 2020-08-28 NOTE — TELEPHONE ENCOUNTER
I spoke to the patient's daughter and she stated the patient has Gout in her foot  the podiatrist they saw prescribed something for it, but she is unsure what it is  She will call us on Monday to let us know what she was put on  She wanted to make sure Dr Rl De Anda is aware due to the patient's kidney issues

## 2020-09-01 ENCOUNTER — TELEPHONE (OUTPATIENT)
Dept: NEPHROLOGY | Facility: CLINIC | Age: 85
End: 2020-09-01

## 2020-09-01 NOTE — TELEPHONE ENCOUNTER
Called and left detailed message on voicemail with stable lab results, also mentioned OK to continue to follow with PCP unless any new issues arise  Left office call back number if any questions

## 2020-09-01 NOTE — TELEPHONE ENCOUNTER
----- Message from Araceli Howe MD sent at 8/31/2020  8:56 PM EDT -----  Please call and let her know I saw her labs from 8/28 and creatinine or kidney function stable at 1 5, which is the same as when I saw her  OK to follow with primary unless there is a problem Thanks  SILVANO Irby MD

## 2020-09-10 ENCOUNTER — APPOINTMENT (OUTPATIENT)
Dept: RADIOLOGY | Age: 85
End: 2020-09-10
Payer: COMMERCIAL

## 2020-09-10 ENCOUNTER — LAB (OUTPATIENT)
Dept: LAB | Age: 85
End: 2020-09-10
Payer: COMMERCIAL

## 2020-09-10 ENCOUNTER — TRANSCRIBE ORDERS (OUTPATIENT)
Dept: ADMINISTRATIVE | Age: 85
End: 2020-09-10

## 2020-09-10 ENCOUNTER — APPOINTMENT (OUTPATIENT)
Dept: LAB | Age: 85
End: 2020-09-10
Payer: COMMERCIAL

## 2020-09-10 DIAGNOSIS — Z01.811 PRE-OPERATIVE RESPIRATORY EXAMINATION: ICD-10-CM

## 2020-09-10 DIAGNOSIS — I12.9 NEPHROSCLEROSIS ARTERIOLAR, STAGE 1-4 OR UNSPECIFIED CHRONIC KIDNEY DISEASE: ICD-10-CM

## 2020-09-10 DIAGNOSIS — Z01.810 PRE-OPERATIVE CARDIOVASCULAR EXAMINATION: ICD-10-CM

## 2020-09-10 DIAGNOSIS — J96.11 CHRONIC RESPIRATORY FAILURE WITH HYPOXIA (HCC): ICD-10-CM

## 2020-09-10 DIAGNOSIS — N17.9 ACUTE KIDNEY INJURY (HCC): ICD-10-CM

## 2020-09-10 DIAGNOSIS — M32.9 SYSTEMIC LUPUS ERYTHEMATOSUS, UNSPECIFIED SLE TYPE, UNSPECIFIED ORGAN INVOLVEMENT STATUS (HCC): Primary | ICD-10-CM

## 2020-09-10 DIAGNOSIS — Z01.810 PRE-OPERATIVE CARDIOVASCULAR EXAMINATION: Primary | ICD-10-CM

## 2020-09-10 DIAGNOSIS — N17.9 AKI (ACUTE KIDNEY INJURY) (HCC): ICD-10-CM

## 2020-09-10 DIAGNOSIS — N18.30 STAGE 3 CHRONIC KIDNEY DISEASE (HCC): ICD-10-CM

## 2020-09-10 DIAGNOSIS — M32.9 SYSTEMIC LUPUS ERYTHEMATOSUS, UNSPECIFIED SLE TYPE, UNSPECIFIED ORGAN INVOLVEMENT STATUS (HCC): ICD-10-CM

## 2020-09-10 DIAGNOSIS — I50.33 ACUTE ON CHRONIC DIASTOLIC HEART FAILURE (HCC): ICD-10-CM

## 2020-09-10 LAB
ALBUMIN SERPL BCP-MCNC: 3.5 G/DL (ref 3.5–5)
ALP SERPL-CCNC: 81 U/L (ref 46–116)
ALT SERPL W P-5'-P-CCNC: 16 U/L (ref 12–78)
ANION GAP SERPL CALCULATED.3IONS-SCNC: 6 MMOL/L (ref 4–13)
AST SERPL W P-5'-P-CCNC: 15 U/L (ref 5–45)
ATRIAL RATE: 60 BPM
BASOPHILS # BLD AUTO: 0.05 THOUSANDS/ΜL (ref 0–0.1)
BASOPHILS NFR BLD AUTO: 1 % (ref 0–1)
BILIRUB SERPL-MCNC: 0.36 MG/DL (ref 0.2–1)
BUN SERPL-MCNC: 66 MG/DL (ref 5–25)
CALCIUM SERPL-MCNC: 9.3 MG/DL (ref 8.3–10.1)
CHLORIDE SERPL-SCNC: 98 MMOL/L (ref 100–108)
CO2 SERPL-SCNC: 32 MMOL/L (ref 21–32)
CREAT SERPL-MCNC: 1.67 MG/DL (ref 0.6–1.3)
CRP SERPL QL: 44.5 MG/L
EOSINOPHIL # BLD AUTO: 0.17 THOUSAND/ΜL (ref 0–0.61)
EOSINOPHIL NFR BLD AUTO: 2 % (ref 0–6)
ERYTHROCYTE [DISTWIDTH] IN BLOOD BY AUTOMATED COUNT: 13.7 % (ref 11.6–15.1)
ERYTHROCYTE [SEDIMENTATION RATE] IN BLOOD: 82 MM/HOUR (ref 0–29)
GFR SERPL CREATININE-BSD FRML MDRD: 28 ML/MIN/1.73SQ M
GLUCOSE SERPL-MCNC: 130 MG/DL (ref 65–140)
HCT VFR BLD AUTO: 32.2 % (ref 34.8–46.1)
HGB BLD-MCNC: 9.9 G/DL (ref 11.5–15.4)
IMM GRANULOCYTES # BLD AUTO: 0.03 THOUSAND/UL (ref 0–0.2)
IMM GRANULOCYTES NFR BLD AUTO: 0 % (ref 0–2)
LYMPHOCYTES # BLD AUTO: 1.9 THOUSANDS/ΜL (ref 0.6–4.47)
LYMPHOCYTES NFR BLD AUTO: 26 % (ref 14–44)
MCH RBC QN AUTO: 31.5 PG (ref 26.8–34.3)
MCHC RBC AUTO-ENTMCNC: 30.7 G/DL (ref 31.4–37.4)
MCV RBC AUTO: 103 FL (ref 82–98)
MONOCYTES # BLD AUTO: 0.38 THOUSAND/ΜL (ref 0.17–1.22)
MONOCYTES NFR BLD AUTO: 5 % (ref 4–12)
NEUTROPHILS # BLD AUTO: 4.75 THOUSANDS/ΜL (ref 1.85–7.62)
NEUTS SEG NFR BLD AUTO: 66 % (ref 43–75)
NRBC BLD AUTO-RTO: 0 /100 WBCS
P AXIS: 18 DEGREES
PLATELET # BLD AUTO: 267 THOUSANDS/UL (ref 149–390)
PMV BLD AUTO: 9.1 FL (ref 8.9–12.7)
POTASSIUM SERPL-SCNC: 4.1 MMOL/L (ref 3.5–5.3)
PR INTERVAL: 192 MS
PROT SERPL-MCNC: 8.3 G/DL (ref 6.4–8.2)
QRS AXIS: -45 DEGREES
QRSD INTERVAL: 100 MS
QT INTERVAL: 506 MS
QTC INTERVAL: 506 MS
RBC # BLD AUTO: 3.14 MILLION/UL (ref 3.81–5.12)
SODIUM SERPL-SCNC: 136 MMOL/L (ref 136–145)
T WAVE AXIS: 139 DEGREES
URATE SERPL-MCNC: 10 MG/DL (ref 2–6.8)
VENTRICULAR RATE: 60 BPM
WBC # BLD AUTO: 7.28 THOUSAND/UL (ref 4.31–10.16)

## 2020-09-10 PROCEDURE — 71046 X-RAY EXAM CHEST 2 VIEWS: CPT

## 2020-09-10 PROCEDURE — 93010 ELECTROCARDIOGRAM REPORT: CPT | Performed by: INTERNAL MEDICINE

## 2020-09-10 PROCEDURE — 36415 COLL VENOUS BLD VENIPUNCTURE: CPT | Performed by: INTERNAL MEDICINE

## 2020-09-10 PROCEDURE — 93005 ELECTROCARDIOGRAM TRACING: CPT

## 2020-09-10 PROCEDURE — 80053 COMPREHEN METABOLIC PANEL: CPT

## 2020-09-10 PROCEDURE — 85652 RBC SED RATE AUTOMATED: CPT

## 2020-09-10 PROCEDURE — 84550 ASSAY OF BLOOD/URIC ACID: CPT

## 2020-09-10 PROCEDURE — 85025 COMPLETE CBC W/AUTO DIFF WBC: CPT | Performed by: INTERNAL MEDICINE

## 2020-09-10 PROCEDURE — 86140 C-REACTIVE PROTEIN: CPT

## 2020-09-18 ENCOUNTER — HOSPITAL ENCOUNTER (OUTPATIENT)
Dept: ULTRASOUND IMAGING | Facility: CLINIC | Age: 85
Discharge: HOME | End: 2020-09-18
Payer: COMMERCIAL

## 2020-09-18 ENCOUNTER — HOSPITAL ENCOUNTER (OUTPATIENT)
Dept: MAMMOGRAPHY | Facility: CLINIC | Age: 85
Discharge: HOME | End: 2020-09-18
Payer: COMMERCIAL

## 2020-09-18 VITALS — HEIGHT: 59 IN | BODY MASS INDEX: 32.66 KG/M2 | WEIGHT: 162 LBS

## 2020-09-18 DIAGNOSIS — Z85.3 HISTORY OF MALIGNANT NEOPLASM OF BREAST: ICD-10-CM

## 2020-09-18 DIAGNOSIS — R92.8 ABNORMAL MAMMOGRAM: ICD-10-CM

## 2020-09-18 PROCEDURE — G0279 TOMOSYNTHESIS, MAMMO: HCPCS

## 2020-09-18 PROCEDURE — 76642 ULTRASOUND BREAST LIMITED: CPT

## 2020-09-18 PROCEDURE — 77066 DX MAMMO INCL CAD BI: CPT

## 2020-09-22 ENCOUNTER — ANESTHESIA EVENT (OUTPATIENT)
Dept: PERIOP | Facility: HOSPITAL | Age: 85
End: 2020-09-22
Payer: COMMERCIAL

## 2020-09-22 RX ORDER — COLCHICINE 0.6 MG/1
0.6 TABLET ORAL SEE ADMIN INSTRUCTIONS
COMMUNITY

## 2020-09-22 RX ORDER — ALLOPURINOL 100 MG/1
1.5 TABLET ORAL DAILY
COMMUNITY

## 2020-09-22 RX ORDER — MULTIVITAMIN
1 CAPSULE ORAL DAILY
COMMUNITY

## 2020-09-22 RX ORDER — IBUPROFEN 200 MG
1 CAPSULE ORAL DAILY
COMMUNITY

## 2020-09-24 ENCOUNTER — HOSPITAL ENCOUNTER (OUTPATIENT)
Facility: HOSPITAL | Age: 85
Setting detail: OUTPATIENT SURGERY
Discharge: HOME | End: 2020-09-24
Attending: STUDENT IN AN ORGANIZED HEALTH CARE EDUCATION/TRAINING PROGRAM | Admitting: STUDENT IN AN ORGANIZED HEALTH CARE EDUCATION/TRAINING PROGRAM
Payer: COMMERCIAL

## 2020-09-24 ENCOUNTER — ANESTHESIA (OUTPATIENT)
Dept: PERIOP | Facility: HOSPITAL | Age: 85
End: 2020-09-24
Payer: COMMERCIAL

## 2020-09-24 VITALS
TEMPERATURE: 97.1 F | OXYGEN SATURATION: 97 % | BODY MASS INDEX: 32.66 KG/M2 | RESPIRATION RATE: 18 BRPM | SYSTOLIC BLOOD PRESSURE: 150 MMHG | HEIGHT: 59 IN | DIASTOLIC BLOOD PRESSURE: 63 MMHG | WEIGHT: 162 LBS | HEART RATE: 60 BPM

## 2020-09-24 VITALS — HEART RATE: 66 BPM

## 2020-09-24 DIAGNOSIS — C44.329 SQUAMOUS CELL CARCINOMA OF SKIN OF OTHER PARTS OF FACE: ICD-10-CM

## 2020-09-24 LAB — GLUCOSE SERPL-MCNC: 142 MG/DL (ref 65–140)

## 2020-09-24 PROCEDURE — 88305 TISSUE EXAM BY PATHOLOGIST: CPT | Performed by: PATHOLOGY

## 2020-09-24 PROCEDURE — 88331 PATH CONSLTJ SURG 1 BLK 1SPC: CPT | Performed by: PATHOLOGY

## 2020-09-24 PROCEDURE — 88332 PATH CONSLTJ SURG EA ADD BLK: CPT | Performed by: PATHOLOGY

## 2020-09-24 PROCEDURE — 82948 REAGENT STRIP/BLOOD GLUCOSE: CPT

## 2020-09-24 RX ORDER — LIDOCAINE HYDROCHLORIDE AND EPINEPHRINE 10; 10 MG/ML; UG/ML
INJECTION, SOLUTION INFILTRATION; PERINEURAL AS NEEDED
Status: DISCONTINUED | OUTPATIENT
Start: 2020-09-24 | End: 2020-09-24 | Stop reason: HOSPADM

## 2020-09-24 RX ORDER — FENTANYL CITRATE 50 UG/ML
INJECTION, SOLUTION INTRAMUSCULAR; INTRAVENOUS AS NEEDED
Status: DISCONTINUED | OUTPATIENT
Start: 2020-09-24 | End: 2020-09-24

## 2020-09-24 RX ORDER — SODIUM CHLORIDE, SODIUM LACTATE, POTASSIUM CHLORIDE, CALCIUM CHLORIDE 600; 310; 30; 20 MG/100ML; MG/100ML; MG/100ML; MG/100ML
50 INJECTION, SOLUTION INTRAVENOUS CONTINUOUS
Status: DISCONTINUED | OUTPATIENT
Start: 2020-09-24 | End: 2020-09-24 | Stop reason: HOSPADM

## 2020-09-24 RX ORDER — FENTANYL CITRATE/PF 50 MCG/ML
25 SYRINGE (ML) INJECTION
Status: DISCONTINUED | OUTPATIENT
Start: 2020-09-24 | End: 2020-09-24 | Stop reason: HOSPADM

## 2020-09-24 RX ORDER — HYDROMORPHONE HCL/PF 1 MG/ML
0.5 SYRINGE (ML) INJECTION
Status: DISCONTINUED | OUTPATIENT
Start: 2020-09-24 | End: 2020-09-24 | Stop reason: HOSPADM

## 2020-09-24 RX ORDER — MIDAZOLAM HYDROCHLORIDE 2 MG/2ML
INJECTION, SOLUTION INTRAMUSCULAR; INTRAVENOUS AS NEEDED
Status: DISCONTINUED | OUTPATIENT
Start: 2020-09-24 | End: 2020-09-24

## 2020-09-24 RX ORDER — CEFAZOLIN SODIUM 2 G/50ML
2000 SOLUTION INTRAVENOUS ONCE
Status: COMPLETED | OUTPATIENT
Start: 2020-09-24 | End: 2020-09-24

## 2020-09-24 RX ORDER — MAGNESIUM HYDROXIDE 1200 MG/15ML
LIQUID ORAL AS NEEDED
Status: DISCONTINUED | OUTPATIENT
Start: 2020-09-24 | End: 2020-09-24 | Stop reason: HOSPADM

## 2020-09-24 RX ORDER — DIPHENHYDRAMINE HYDROCHLORIDE 50 MG/ML
12.5 INJECTION INTRAMUSCULAR; INTRAVENOUS ONCE AS NEEDED
Status: DISCONTINUED | OUTPATIENT
Start: 2020-09-24 | End: 2020-09-24 | Stop reason: HOSPADM

## 2020-09-24 RX ORDER — GINSENG 100 MG
CAPSULE ORAL AS NEEDED
Status: DISCONTINUED | OUTPATIENT
Start: 2020-09-24 | End: 2020-09-24 | Stop reason: HOSPADM

## 2020-09-24 RX ADMIN — FENTANYL CITRATE 50 MCG: 50 INJECTION INTRAMUSCULAR; INTRAVENOUS at 09:21

## 2020-09-24 RX ADMIN — SODIUM CHLORIDE, SODIUM LACTATE, POTASSIUM CHLORIDE, AND CALCIUM CHLORIDE: .6; .31; .03; .02 INJECTION, SOLUTION INTRAVENOUS at 09:15

## 2020-09-24 RX ADMIN — MIDAZOLAM HYDROCHLORIDE 0.5 MG: 1 INJECTION, SOLUTION INTRAMUSCULAR; INTRAVENOUS at 09:45

## 2020-09-24 RX ADMIN — CEFAZOLIN SODIUM 2000 MG: 2 SOLUTION INTRAVENOUS at 09:15

## 2020-09-24 RX ADMIN — MIDAZOLAM HYDROCHLORIDE 1 MG: 1 INJECTION, SOLUTION INTRAMUSCULAR; INTRAVENOUS at 09:21

## 2020-09-24 RX ADMIN — MIDAZOLAM HYDROCHLORIDE 0.5 MG: 1 INJECTION, SOLUTION INTRAMUSCULAR; INTRAVENOUS at 09:36

## 2020-09-24 RX ADMIN — FENTANYL CITRATE 50 MCG: 50 INJECTION INTRAMUSCULAR; INTRAVENOUS at 09:35

## 2020-09-29 ENCOUNTER — APPOINTMENT (EMERGENCY)
Dept: RADIOLOGY | Facility: HOSPITAL | Age: 85
DRG: 177 | End: 2020-09-29
Payer: COMMERCIAL

## 2020-09-29 ENCOUNTER — HOSPITAL ENCOUNTER (INPATIENT)
Facility: HOSPITAL | Age: 85
LOS: 2 days | Discharge: HOME | DRG: 177 | End: 2020-10-01
Attending: EMERGENCY MEDICINE | Admitting: INTERNAL MEDICINE
Payer: COMMERCIAL

## 2020-09-29 DIAGNOSIS — R09.89 CHOKING EPISODE: ICD-10-CM

## 2020-09-29 DIAGNOSIS — R09.02 HYPOXIA: Primary | ICD-10-CM

## 2020-09-29 DIAGNOSIS — J69.0 ASPIRATION PNEUMONITIS (HCC): ICD-10-CM

## 2020-09-29 DIAGNOSIS — G62.9 PERIPHERAL POLYNEUROPATHY: Chronic | ICD-10-CM

## 2020-09-29 PROBLEM — J96.11 CHRONIC RESPIRATORY FAILURE WITH HYPOXIA (HCC): Status: ACTIVE | Noted: 2019-10-21

## 2020-09-29 PROBLEM — Z08 ENCOUNTER FOR FOLLOW-UP EXAMINATION AFTER COMPLETED TREATMENT FOR MALIGNANT NEOPLASM: Status: RESOLVED | Noted: 2020-05-19 | Resolved: 2020-09-29

## 2020-09-29 PROBLEM — I50.42 CHRONIC COMBINED SYSTOLIC AND DIASTOLIC HEART FAILURE (HCC): Status: ACTIVE | Noted: 2019-01-17

## 2020-09-29 PROBLEM — G92.8 TOXIC METABOLIC ENCEPHALOPATHY: Status: ACTIVE | Noted: 2020-09-29

## 2020-09-29 PROBLEM — I21.A1 TYPE 2 MI (MYOCARDIAL INFARCTION) (HCC): Status: RESOLVED | Noted: 2019-10-21 | Resolved: 2020-09-29

## 2020-09-29 PROBLEM — D53.9 MACROCYTIC ANEMIA: Status: ACTIVE | Noted: 2020-09-29

## 2020-09-29 PROBLEM — J30.1 SEASONAL ALLERGIC RHINITIS DUE TO POLLEN: Status: ACTIVE | Noted: 2020-03-24

## 2020-09-29 LAB
ALBUMIN SERPL BCP-MCNC: 3.5 G/DL (ref 3.5–5)
ALP SERPL-CCNC: 71 U/L (ref 46–116)
ALT SERPL W P-5'-P-CCNC: 12 U/L (ref 12–78)
ANION GAP SERPL CALCULATED.3IONS-SCNC: 8 MMOL/L (ref 4–13)
APTT PPP: 28 SECONDS (ref 23–37)
AST SERPL W P-5'-P-CCNC: 17 U/L (ref 5–45)
BASOPHILS # BLD AUTO: 0.07 THOUSANDS/ΜL (ref 0–0.1)
BASOPHILS NFR BLD AUTO: 1 % (ref 0–1)
BILIRUB SERPL-MCNC: 0.43 MG/DL (ref 0.2–1)
BUN SERPL-MCNC: 46 MG/DL (ref 5–25)
CALCIUM SERPL-MCNC: 10.1 MG/DL (ref 8.3–10.1)
CHLORIDE SERPL-SCNC: 100 MMOL/L (ref 100–108)
CO2 SERPL-SCNC: 30 MMOL/L (ref 21–32)
CREAT SERPL-MCNC: 1.68 MG/DL (ref 0.6–1.3)
EOSINOPHIL # BLD AUTO: 0.2 THOUSAND/ΜL (ref 0–0.61)
EOSINOPHIL NFR BLD AUTO: 2 % (ref 0–6)
ERYTHROCYTE [DISTWIDTH] IN BLOOD BY AUTOMATED COUNT: 14.5 % (ref 11.6–15.1)
GFR SERPL CREATININE-BSD FRML MDRD: 27 ML/MIN/1.73SQ M
GLUCOSE SERPL-MCNC: 230 MG/DL (ref 65–140)
HCT VFR BLD AUTO: 34.7 % (ref 34.8–46.1)
HGB BLD-MCNC: 10.3 G/DL (ref 11.5–15.4)
IMM GRANULOCYTES # BLD AUTO: 0.03 THOUSAND/UL (ref 0–0.2)
IMM GRANULOCYTES NFR BLD AUTO: 0 % (ref 0–2)
INR PPP: 1.01 (ref 0.84–1.19)
LACTATE SERPL-SCNC: 2.1 MMOL/L (ref 0.5–2)
LYMPHOCYTES # BLD AUTO: 2.46 THOUSANDS/ΜL (ref 0.6–4.47)
LYMPHOCYTES NFR BLD AUTO: 24 % (ref 14–44)
MCH RBC QN AUTO: 31 PG (ref 26.8–34.3)
MCHC RBC AUTO-ENTMCNC: 29.7 G/DL (ref 31.4–37.4)
MCV RBC AUTO: 105 FL (ref 82–98)
MONOCYTES # BLD AUTO: 0.46 THOUSAND/ΜL (ref 0.17–1.22)
MONOCYTES NFR BLD AUTO: 5 % (ref 4–12)
NEUTROPHILS # BLD AUTO: 7.11 THOUSANDS/ΜL (ref 1.85–7.62)
NEUTS SEG NFR BLD AUTO: 68 % (ref 43–75)
NRBC BLD AUTO-RTO: 0 /100 WBCS
NT-PROBNP SERPL-MCNC: ABNORMAL PG/ML
PLATELET # BLD AUTO: 177 THOUSANDS/UL (ref 149–390)
PMV BLD AUTO: 9.5 FL (ref 8.9–12.7)
POTASSIUM SERPL-SCNC: 3.9 MMOL/L (ref 3.5–5.3)
PROCALCITONIN SERPL-MCNC: 0.23 NG/ML
PROT SERPL-MCNC: 8 G/DL (ref 6.4–8.2)
PROTHROMBIN TIME: 13.4 SECONDS (ref 11.6–14.5)
RBC # BLD AUTO: 3.32 MILLION/UL (ref 3.81–5.12)
SODIUM SERPL-SCNC: 138 MMOL/L (ref 136–145)
WBC # BLD AUTO: 10.33 THOUSAND/UL (ref 4.31–10.16)

## 2020-09-29 PROCEDURE — 99285 EMERGENCY DEPT VISIT HI MDM: CPT

## 2020-09-29 PROCEDURE — 99285 EMERGENCY DEPT VISIT HI MDM: CPT | Performed by: EMERGENCY MEDICINE

## 2020-09-29 PROCEDURE — 36415 COLL VENOUS BLD VENIPUNCTURE: CPT | Performed by: EMERGENCY MEDICINE

## 2020-09-29 PROCEDURE — 85730 THROMBOPLASTIN TIME PARTIAL: CPT | Performed by: EMERGENCY MEDICINE

## 2020-09-29 PROCEDURE — 85610 PROTHROMBIN TIME: CPT | Performed by: EMERGENCY MEDICINE

## 2020-09-29 PROCEDURE — 80053 COMPREHEN METABOLIC PANEL: CPT | Performed by: EMERGENCY MEDICINE

## 2020-09-29 PROCEDURE — 85025 COMPLETE CBC W/AUTO DIFF WBC: CPT | Performed by: EMERGENCY MEDICINE

## 2020-09-29 PROCEDURE — 83880 ASSAY OF NATRIURETIC PEPTIDE: CPT | Performed by: EMERGENCY MEDICINE

## 2020-09-29 PROCEDURE — 84145 PROCALCITONIN (PCT): CPT | Performed by: EMERGENCY MEDICINE

## 2020-09-29 PROCEDURE — 99223 1ST HOSP IP/OBS HIGH 75: CPT | Performed by: INTERNAL MEDICINE

## 2020-09-29 PROCEDURE — 87040 BLOOD CULTURE FOR BACTERIA: CPT | Performed by: EMERGENCY MEDICINE

## 2020-09-29 PROCEDURE — 96365 THER/PROPH/DIAG IV INF INIT: CPT

## 2020-09-29 PROCEDURE — 71045 X-RAY EXAM CHEST 1 VIEW: CPT

## 2020-09-29 PROCEDURE — 83605 ASSAY OF LACTIC ACID: CPT | Performed by: EMERGENCY MEDICINE

## 2020-09-29 RX ORDER — SERTRALINE HYDROCHLORIDE 25 MG/1
25 TABLET, FILM COATED ORAL
Status: DISCONTINUED | OUTPATIENT
Start: 2020-09-30 | End: 2020-10-01 | Stop reason: HOSPADM

## 2020-09-29 RX ORDER — SODIUM CHLORIDE 9 MG/ML
3 INJECTION INTRAVENOUS
Status: DISCONTINUED | OUTPATIENT
Start: 2020-09-29 | End: 2020-10-01 | Stop reason: HOSPADM

## 2020-09-29 RX ORDER — GABAPENTIN 300 MG/1
300 CAPSULE ORAL 3 TIMES DAILY
Status: DISCONTINUED | OUTPATIENT
Start: 2020-09-29 | End: 2020-10-01 | Stop reason: HOSPADM

## 2020-09-29 RX ORDER — HYDROCODONE BITARTRATE AND ACETAMINOPHEN 5; 325 MG/1; MG/1
1 TABLET ORAL EVERY 8 HOURS PRN
Status: DISCONTINUED | OUTPATIENT
Start: 2020-09-29 | End: 2020-10-01 | Stop reason: HOSPADM

## 2020-09-29 RX ORDER — DOCUSATE SODIUM 100 MG/1
100 CAPSULE, LIQUID FILLED ORAL 2 TIMES DAILY PRN
Status: DISCONTINUED | OUTPATIENT
Start: 2020-09-29 | End: 2020-10-01 | Stop reason: HOSPADM

## 2020-09-29 RX ORDER — ALBUTEROL SULFATE 2.5 MG/3ML
1 SOLUTION RESPIRATORY (INHALATION) ONCE
Status: COMPLETED | OUTPATIENT
Start: 2020-09-29 | End: 2020-09-29

## 2020-09-29 RX ORDER — ALBUTEROL SULFATE 90 UG/1
2 AEROSOL, METERED RESPIRATORY (INHALATION) EVERY 4 HOURS PRN
Status: DISCONTINUED | OUTPATIENT
Start: 2020-09-29 | End: 2020-10-01 | Stop reason: HOSPADM

## 2020-09-29 RX ORDER — NITROGLYCERIN 0.4 MG/1
0.4 TABLET SUBLINGUAL
Status: DISCONTINUED | OUTPATIENT
Start: 2020-09-29 | End: 2020-10-01 | Stop reason: HOSPADM

## 2020-09-29 RX ORDER — B-COMPLEX WITH VITAMIN C
1 TABLET ORAL
Status: DISCONTINUED | OUTPATIENT
Start: 2020-09-30 | End: 2020-10-01 | Stop reason: HOSPADM

## 2020-09-29 RX ORDER — DOCUSATE SODIUM 100 MG/1
100 CAPSULE, LIQUID FILLED ORAL 2 TIMES DAILY PRN
COMMUNITY

## 2020-09-29 RX ORDER — METHOCARBAMOL 500 MG/1
500 TABLET, FILM COATED ORAL 3 TIMES DAILY PRN
COMMUNITY

## 2020-09-29 RX ORDER — TORSEMIDE 20 MG/1
20 TABLET ORAL
Status: DISCONTINUED | OUTPATIENT
Start: 2020-09-30 | End: 2020-10-01 | Stop reason: HOSPADM

## 2020-09-29 RX ORDER — CLOPIDOGREL BISULFATE 75 MG/1
75 TABLET ORAL DAILY
Status: DISCONTINUED | OUTPATIENT
Start: 2020-09-30 | End: 2020-10-01 | Stop reason: HOSPADM

## 2020-09-29 RX ORDER — ACETAMINOPHEN 325 MG/1
650 TABLET ORAL EVERY 6 HOURS PRN
Status: DISCONTINUED | OUTPATIENT
Start: 2020-09-29 | End: 2020-10-01 | Stop reason: HOSPADM

## 2020-09-29 RX ORDER — ISOSORBIDE MONONITRATE 30 MG/1
30 TABLET, EXTENDED RELEASE ORAL DAILY
Status: DISCONTINUED | OUTPATIENT
Start: 2020-09-30 | End: 2020-10-01 | Stop reason: HOSPADM

## 2020-09-29 RX ORDER — COLCHICINE 0.6 MG/1
0.6 TABLET ORAL DAILY
Status: DISCONTINUED | OUTPATIENT
Start: 2020-09-30 | End: 2020-10-01 | Stop reason: HOSPADM

## 2020-09-29 RX ORDER — TORSEMIDE 20 MG/1
40 TABLET ORAL DAILY
Status: DISCONTINUED | OUTPATIENT
Start: 2020-09-30 | End: 2020-10-01 | Stop reason: HOSPADM

## 2020-09-29 RX ORDER — METHOCARBAMOL 500 MG/1
500 TABLET, FILM COATED ORAL 3 TIMES DAILY PRN
Status: DISCONTINUED | OUTPATIENT
Start: 2020-09-29 | End: 2020-10-01 | Stop reason: HOSPADM

## 2020-09-29 RX ORDER — ALLOPURINOL 100 MG/1
100 TABLET ORAL DAILY
Status: DISCONTINUED | OUTPATIENT
Start: 2020-09-30 | End: 2020-10-01 | Stop reason: HOSPADM

## 2020-09-29 RX ORDER — LIDOCAINE 50 MG/G
1 PATCH TOPICAL AS NEEDED
COMMUNITY

## 2020-09-29 RX ORDER — HYDROCODONE BITARTRATE AND ACETAMINOPHEN 5; 325 MG/1; MG/1
1 TABLET ORAL EVERY 8 HOURS PRN
Status: ON HOLD | COMMUNITY
End: 2022-06-02 | Stop reason: SDUPTHER

## 2020-09-29 RX ORDER — LIDOCAINE 50 MG/G
1 PATCH TOPICAL DAILY
Status: DISCONTINUED | OUTPATIENT
Start: 2020-09-30 | End: 2020-10-01 | Stop reason: HOSPADM

## 2020-09-29 RX ORDER — LEVOTHYROXINE SODIUM 112 UG/1
112 TABLET ORAL
Status: DISCONTINUED | OUTPATIENT
Start: 2020-09-30 | End: 2020-10-01 | Stop reason: HOSPADM

## 2020-09-29 RX ADMIN — CEFEPIME HYDROCHLORIDE 2000 MG: 2 INJECTION, POWDER, FOR SOLUTION INTRAVENOUS at 22:18

## 2020-09-29 RX ADMIN — METRONIDAZOLE 500 MG: 500 INJECTION, SOLUTION INTRAVENOUS at 23:07

## 2020-09-30 ENCOUNTER — APPOINTMENT (INPATIENT)
Dept: RADIOLOGY | Facility: HOSPITAL | Age: 85
DRG: 177 | End: 2020-09-30
Attending: INTERNAL MEDICINE
Payer: COMMERCIAL

## 2020-09-30 PROBLEM — M1A.9XX0 CHRONIC GOUT WITHOUT TOPHUS: Status: ACTIVE | Noted: 2020-09-30

## 2020-09-30 LAB
ANION GAP SERPL CALCULATED.3IONS-SCNC: 8 MMOL/L (ref 4–13)
BILIRUB UR QL STRIP: NEGATIVE
BUN SERPL-MCNC: 44 MG/DL (ref 5–25)
CALCIUM SERPL-MCNC: 9.1 MG/DL (ref 8.3–10.1)
CHLORIDE SERPL-SCNC: 100 MMOL/L (ref 100–108)
CLARITY UR: CLEAR
CO2 SERPL-SCNC: 30 MMOL/L (ref 21–32)
COLOR UR: YELLOW
CREAT SERPL-MCNC: 1.58 MG/DL (ref 0.6–1.3)
ERYTHROCYTE [DISTWIDTH] IN BLOOD BY AUTOMATED COUNT: 14.5 % (ref 11.6–15.1)
GFR SERPL CREATININE-BSD FRML MDRD: 29 ML/MIN/1.73SQ M
GLUCOSE SERPL-MCNC: 115 MG/DL (ref 65–140)
GLUCOSE SERPL-MCNC: 120 MG/DL (ref 65–140)
GLUCOSE SERPL-MCNC: 136 MG/DL (ref 65–140)
GLUCOSE SERPL-MCNC: 141 MG/DL (ref 65–140)
GLUCOSE UR STRIP-MCNC: NEGATIVE MG/DL
HCT VFR BLD AUTO: 29.6 % (ref 34.8–46.1)
HGB BLD-MCNC: 9 G/DL (ref 11.5–15.4)
HGB UR QL STRIP.AUTO: NEGATIVE
KETONES UR STRIP-MCNC: NEGATIVE MG/DL
LACTATE SERPL-SCNC: 1.4 MMOL/L (ref 0.5–2)
LEUKOCYTE ESTERASE UR QL STRIP: NEGATIVE
MCH RBC QN AUTO: 31.5 PG (ref 26.8–34.3)
MCHC RBC AUTO-ENTMCNC: 30.4 G/DL (ref 31.4–37.4)
MCV RBC AUTO: 104 FL (ref 82–98)
NITRITE UR QL STRIP: NEGATIVE
PH UR STRIP.AUTO: 5.5 [PH]
PLATELET # BLD AUTO: 141 THOUSANDS/UL (ref 149–390)
PMV BLD AUTO: 9.9 FL (ref 8.9–12.7)
POTASSIUM SERPL-SCNC: 3.7 MMOL/L (ref 3.5–5.3)
PROCALCITONIN SERPL-MCNC: 14.93 NG/ML
PROT UR STRIP-MCNC: NEGATIVE MG/DL
RBC # BLD AUTO: 2.86 MILLION/UL (ref 3.81–5.12)
SODIUM SERPL-SCNC: 138 MMOL/L (ref 136–145)
SP GR UR STRIP.AUTO: 1.01 (ref 1–1.03)
UROBILINOGEN UR QL STRIP.AUTO: 0.2 E.U./DL
WBC # BLD AUTO: 8.54 THOUSAND/UL (ref 4.31–10.16)

## 2020-09-30 PROCEDURE — 74230 X-RAY XM SWLNG FUNCJ C+: CPT

## 2020-09-30 PROCEDURE — 94760 N-INVAS EAR/PLS OXIMETRY 1: CPT

## 2020-09-30 PROCEDURE — 92610 EVALUATE SWALLOWING FUNCTION: CPT

## 2020-09-30 PROCEDURE — 80048 BASIC METABOLIC PNL TOTAL CA: CPT | Performed by: INTERNAL MEDICINE

## 2020-09-30 PROCEDURE — 81003 URINALYSIS AUTO W/O SCOPE: CPT | Performed by: EMERGENCY MEDICINE

## 2020-09-30 PROCEDURE — 99232 SBSQ HOSP IP/OBS MODERATE 35: CPT | Performed by: PHYSICIAN ASSISTANT

## 2020-09-30 PROCEDURE — 36415 COLL VENOUS BLD VENIPUNCTURE: CPT | Performed by: INTERNAL MEDICINE

## 2020-09-30 PROCEDURE — 84145 PROCALCITONIN (PCT): CPT | Performed by: EMERGENCY MEDICINE

## 2020-09-30 PROCEDURE — 82948 REAGENT STRIP/BLOOD GLUCOSE: CPT

## 2020-09-30 PROCEDURE — 94640 AIRWAY INHALATION TREATMENT: CPT

## 2020-09-30 PROCEDURE — 85027 COMPLETE CBC AUTOMATED: CPT | Performed by: INTERNAL MEDICINE

## 2020-09-30 PROCEDURE — 92611 MOTION FLUOROSCOPY/SWALLOW: CPT

## 2020-09-30 RX ORDER — GUAIFENESIN 600 MG
600 TABLET, EXTENDED RELEASE 12 HR ORAL 2 TIMES DAILY
Status: DISCONTINUED | OUTPATIENT
Start: 2020-09-30 | End: 2020-10-01 | Stop reason: HOSPADM

## 2020-09-30 RX ORDER — METRONIDAZOLE 500 MG/1
500 TABLET ORAL EVERY 8 HOURS SCHEDULED
Status: DISCONTINUED | OUTPATIENT
Start: 2020-09-30 | End: 2020-10-01 | Stop reason: HOSPADM

## 2020-09-30 RX ORDER — LEVALBUTEROL 1.25 MG/.5ML
1.25 SOLUTION, CONCENTRATE RESPIRATORY (INHALATION)
Status: DISCONTINUED | OUTPATIENT
Start: 2020-09-30 | End: 2020-10-01 | Stop reason: HOSPADM

## 2020-09-30 RX ORDER — HEPARIN SODIUM 5000 [USP'U]/ML
5000 INJECTION, SOLUTION INTRAVENOUS; SUBCUTANEOUS EVERY 8 HOURS SCHEDULED
Status: DISCONTINUED | OUTPATIENT
Start: 2020-09-30 | End: 2020-10-01 | Stop reason: HOSPADM

## 2020-09-30 RX ORDER — AZITHROMYCIN 500 MG/1
500 TABLET, FILM COATED ORAL EVERY 24 HOURS
Status: DISCONTINUED | OUTPATIENT
Start: 2020-09-30 | End: 2020-10-01 | Stop reason: HOSPADM

## 2020-09-30 RX ADMIN — GUAIFENESIN 600 MG: 600 TABLET, EXTENDED RELEASE ORAL at 10:57

## 2020-09-30 RX ADMIN — LEVOTHYROXINE SODIUM 112 MCG: 112 TABLET ORAL at 05:53

## 2020-09-30 RX ADMIN — METOPROLOL TARTRATE 25 MG: 25 TABLET, FILM COATED ORAL at 17:36

## 2020-09-30 RX ADMIN — SERTRALINE HYDROCHLORIDE 25 MG: 25 TABLET ORAL at 05:53

## 2020-09-30 RX ADMIN — CEFTRIAXONE 1000 MG: 1 INJECTION, POWDER, FOR SOLUTION INTRAMUSCULAR; INTRAVENOUS at 11:00

## 2020-09-30 RX ADMIN — SERTRALINE HYDROCHLORIDE 25 MG: 25 TABLET ORAL at 21:05

## 2020-09-30 RX ADMIN — HEPARIN SODIUM 5000 UNITS: 5000 INJECTION INTRAVENOUS; SUBCUTANEOUS at 07:30

## 2020-09-30 RX ADMIN — GABAPENTIN 300 MG: 300 CAPSULE ORAL at 21:05

## 2020-09-30 RX ADMIN — ALLOPURINOL 100 MG: 100 TABLET ORAL at 11:00

## 2020-09-30 RX ADMIN — GABAPENTIN 300 MG: 300 CAPSULE ORAL at 17:40

## 2020-09-30 RX ADMIN — ISOSORBIDE MONONITRATE 30 MG: 30 TABLET, EXTENDED RELEASE ORAL at 10:59

## 2020-09-30 RX ADMIN — AZITHROMYCIN 500 MG: 500 TABLET, FILM COATED ORAL at 10:59

## 2020-09-30 RX ADMIN — LEVALBUTEROL HYDROCHLORIDE 1.25 MG: 1.25 SOLUTION, CONCENTRATE RESPIRATORY (INHALATION) at 19:57

## 2020-09-30 RX ADMIN — HEPARIN SODIUM 5000 UNITS: 5000 INJECTION INTRAVENOUS; SUBCUTANEOUS at 21:05

## 2020-09-30 RX ADMIN — OYSTER SHELL CALCIUM WITH VITAMIN D 1 TABLET: 500; 200 TABLET, FILM COATED ORAL at 05:53

## 2020-09-30 RX ADMIN — GABAPENTIN 300 MG: 300 CAPSULE ORAL at 05:52

## 2020-09-30 RX ADMIN — HYDROCODONE BITARTRATE AND ACETAMINOPHEN 1 TABLET: 5; 325 TABLET ORAL at 03:48

## 2020-09-30 RX ADMIN — CLOPIDOGREL BISULFATE 75 MG: 75 TABLET ORAL at 11:05

## 2020-09-30 RX ADMIN — LIDOCAINE 5% 1 PATCH: 700 PATCH TOPICAL at 11:00

## 2020-09-30 RX ADMIN — HEPARIN SODIUM 5000 UNITS: 5000 INJECTION INTRAVENOUS; SUBCUTANEOUS at 14:32

## 2020-09-30 RX ADMIN — GUAIFENESIN 600 MG: 600 TABLET, EXTENDED RELEASE ORAL at 17:36

## 2020-09-30 RX ADMIN — METRONIDAZOLE 500 MG: 500 TABLET ORAL at 21:05

## 2020-09-30 RX ADMIN — IPRATROPIUM BROMIDE 0.5 MG: 0.5 SOLUTION RESPIRATORY (INHALATION) at 19:57

## 2020-09-30 RX ADMIN — METRONIDAZOLE 500 MG: 500 TABLET ORAL at 14:32

## 2020-09-30 RX ADMIN — TORSEMIDE 20 MG: 20 TABLET ORAL at 17:40

## 2020-09-30 RX ADMIN — Medication 1 TABLET: at 10:58

## 2020-09-30 RX ADMIN — METRONIDAZOLE 500 MG: 500 TABLET ORAL at 11:00

## 2020-09-30 RX ADMIN — COLCHICINE 0.6 MG: 0.6 TABLET ORAL at 10:57

## 2020-10-01 VITALS
HEART RATE: 67 BPM | SYSTOLIC BLOOD PRESSURE: 126 MMHG | WEIGHT: 161.82 LBS | BODY MASS INDEX: 32.62 KG/M2 | HEIGHT: 59 IN | TEMPERATURE: 97.9 F | RESPIRATION RATE: 16 BRPM | DIASTOLIC BLOOD PRESSURE: 56 MMHG | OXYGEN SATURATION: 94 %

## 2020-10-01 LAB
ANION GAP SERPL CALCULATED.3IONS-SCNC: 5 MMOL/L (ref 4–13)
BASOPHILS # BLD AUTO: 0.04 THOUSANDS/ΜL (ref 0–0.1)
BASOPHILS NFR BLD AUTO: 1 % (ref 0–1)
BUN SERPL-MCNC: 48 MG/DL (ref 5–25)
CALCIUM SERPL-MCNC: 9.1 MG/DL (ref 8.3–10.1)
CHLORIDE SERPL-SCNC: 101 MMOL/L (ref 100–108)
CO2 SERPL-SCNC: 32 MMOL/L (ref 21–32)
CREAT SERPL-MCNC: 1.56 MG/DL (ref 0.6–1.3)
EOSINOPHIL # BLD AUTO: 0.38 THOUSAND/ΜL (ref 0–0.61)
EOSINOPHIL NFR BLD AUTO: 7 % (ref 0–6)
ERYTHROCYTE [DISTWIDTH] IN BLOOD BY AUTOMATED COUNT: 14.5 % (ref 11.6–15.1)
GFR SERPL CREATININE-BSD FRML MDRD: 30 ML/MIN/1.73SQ M
GLUCOSE SERPL-MCNC: 128 MG/DL (ref 65–140)
GLUCOSE SERPL-MCNC: 86 MG/DL (ref 65–140)
GLUCOSE SERPL-MCNC: 95 MG/DL (ref 65–140)
HCT VFR BLD AUTO: 27.9 % (ref 34.8–46.1)
HGB BLD-MCNC: 8.7 G/DL (ref 11.5–15.4)
IMM GRANULOCYTES # BLD AUTO: 0.01 THOUSAND/UL (ref 0–0.2)
IMM GRANULOCYTES NFR BLD AUTO: 0 % (ref 0–2)
LYMPHOCYTES # BLD AUTO: 1.63 THOUSANDS/ΜL (ref 0.6–4.47)
LYMPHOCYTES NFR BLD AUTO: 29 % (ref 14–44)
MCH RBC QN AUTO: 31.9 PG (ref 26.8–34.3)
MCHC RBC AUTO-ENTMCNC: 31.2 G/DL (ref 31.4–37.4)
MCV RBC AUTO: 102 FL (ref 82–98)
MONOCYTES # BLD AUTO: 0.44 THOUSAND/ΜL (ref 0.17–1.22)
MONOCYTES NFR BLD AUTO: 8 % (ref 4–12)
NEUTROPHILS # BLD AUTO: 3.06 THOUSANDS/ΜL (ref 1.85–7.62)
NEUTS SEG NFR BLD AUTO: 55 % (ref 43–75)
NRBC BLD AUTO-RTO: 0 /100 WBCS
PLATELET # BLD AUTO: 132 THOUSANDS/UL (ref 149–390)
PMV BLD AUTO: 9.3 FL (ref 8.9–12.7)
POTASSIUM SERPL-SCNC: 3.7 MMOL/L (ref 3.5–5.3)
PROCALCITONIN SERPL-MCNC: 17.77 NG/ML
RBC # BLD AUTO: 2.73 MILLION/UL (ref 3.81–5.12)
SODIUM SERPL-SCNC: 138 MMOL/L (ref 136–145)
WBC # BLD AUTO: 5.56 THOUSAND/UL (ref 4.31–10.16)

## 2020-10-01 PROCEDURE — 94760 N-INVAS EAR/PLS OXIMETRY 1: CPT

## 2020-10-01 PROCEDURE — 99239 HOSP IP/OBS DSCHRG MGMT >30: CPT | Performed by: PHYSICIAN ASSISTANT

## 2020-10-01 PROCEDURE — 97162 PT EVAL MOD COMPLEX 30 MIN: CPT

## 2020-10-01 PROCEDURE — 84145 PROCALCITONIN (PCT): CPT | Performed by: PHYSICIAN ASSISTANT

## 2020-10-01 PROCEDURE — 82948 REAGENT STRIP/BLOOD GLUCOSE: CPT

## 2020-10-01 PROCEDURE — 94640 AIRWAY INHALATION TREATMENT: CPT

## 2020-10-01 PROCEDURE — 97166 OT EVAL MOD COMPLEX 45 MIN: CPT

## 2020-10-01 PROCEDURE — 80048 BASIC METABOLIC PNL TOTAL CA: CPT | Performed by: PHYSICIAN ASSISTANT

## 2020-10-01 PROCEDURE — 92526 ORAL FUNCTION THERAPY: CPT

## 2020-10-01 PROCEDURE — 85025 COMPLETE CBC W/AUTO DIFF WBC: CPT | Performed by: PHYSICIAN ASSISTANT

## 2020-10-01 RX ORDER — CEFDINIR 300 MG/1
300 CAPSULE ORAL EVERY 24 HOURS
Qty: 7 CAPSULE | Refills: 0 | Status: SHIPPED | OUTPATIENT
Start: 2020-10-01 | End: 2020-10-08

## 2020-10-01 RX ORDER — METRONIDAZOLE 500 MG/1
500 TABLET ORAL EVERY 8 HOURS SCHEDULED
Qty: 15 TABLET | Refills: 0 | Status: SHIPPED | OUTPATIENT
Start: 2020-10-01 | End: 2020-10-06

## 2020-10-01 RX ADMIN — METOPROLOL TARTRATE 25 MG: 25 TABLET, FILM COATED ORAL at 09:53

## 2020-10-01 RX ADMIN — Medication 1 TABLET: at 09:49

## 2020-10-01 RX ADMIN — METRONIDAZOLE 500 MG: 500 TABLET ORAL at 05:11

## 2020-10-01 RX ADMIN — LEVALBUTEROL HYDROCHLORIDE 1.25 MG: 1.25 SOLUTION, CONCENTRATE RESPIRATORY (INHALATION) at 07:10

## 2020-10-01 RX ADMIN — ACETAMINOPHEN 650 MG: 325 TABLET, FILM COATED ORAL at 02:00

## 2020-10-01 RX ADMIN — HEPARIN SODIUM 5000 UNITS: 5000 INJECTION INTRAVENOUS; SUBCUTANEOUS at 05:11

## 2020-10-01 RX ADMIN — LIDOCAINE 5% 1 PATCH: 700 PATCH TOPICAL at 09:50

## 2020-10-01 RX ADMIN — COLCHICINE 0.6 MG: 0.6 TABLET ORAL at 09:49

## 2020-10-01 RX ADMIN — OYSTER SHELL CALCIUM WITH VITAMIN D 1 TABLET: 500; 200 TABLET, FILM COATED ORAL at 07:53

## 2020-10-01 RX ADMIN — CLOPIDOGREL BISULFATE 75 MG: 75 TABLET ORAL at 09:49

## 2020-10-01 RX ADMIN — CEFTRIAXONE 1000 MG: 1 INJECTION, POWDER, FOR SOLUTION INTRAMUSCULAR; INTRAVENOUS at 10:03

## 2020-10-01 RX ADMIN — GABAPENTIN 300 MG: 300 CAPSULE ORAL at 09:49

## 2020-10-01 RX ADMIN — AZITHROMYCIN 500 MG: 500 TABLET, FILM COATED ORAL at 10:01

## 2020-10-01 RX ADMIN — LEVOTHYROXINE SODIUM 112 MCG: 112 TABLET ORAL at 05:10

## 2020-10-01 RX ADMIN — GUAIFENESIN 600 MG: 600 TABLET, EXTENDED RELEASE ORAL at 09:49

## 2020-10-01 RX ADMIN — ISOSORBIDE MONONITRATE 30 MG: 30 TABLET, EXTENDED RELEASE ORAL at 10:01

## 2020-10-01 RX ADMIN — TORSEMIDE 40 MG: 20 TABLET ORAL at 09:52

## 2020-10-01 RX ADMIN — ALLOPURINOL 100 MG: 100 TABLET ORAL at 09:48

## 2020-10-01 RX ADMIN — IPRATROPIUM BROMIDE 0.5 MG: 0.5 SOLUTION RESPIRATORY (INHALATION) at 07:10

## 2020-10-05 LAB
BACTERIA BLD CULT: NORMAL
BACTERIA BLD CULT: NORMAL

## 2020-10-12 DIAGNOSIS — I50.32 CHRONIC DIASTOLIC CHF (CONGESTIVE HEART FAILURE), NYHA CLASS 2 (HCC): Primary | ICD-10-CM

## 2020-10-13 DIAGNOSIS — I50.32 CHRONIC DIASTOLIC CHF (CONGESTIVE HEART FAILURE), NYHA CLASS 2 (HCC): ICD-10-CM

## 2020-10-13 DIAGNOSIS — I50.42 CHRONIC COMBINED SYSTOLIC AND DIASTOLIC HEART FAILURE (HCC): ICD-10-CM

## 2020-10-13 DIAGNOSIS — J96.11 CHRONIC RESPIRATORY FAILURE WITH HYPOXIA (HCC): ICD-10-CM

## 2020-10-13 DIAGNOSIS — I50.42 CHRONIC COMBINED SYSTOLIC AND DIASTOLIC HEART FAILURE (HCC): Primary | ICD-10-CM

## 2020-10-13 RX ORDER — TORSEMIDE 20 MG/1
TABLET ORAL
Qty: 270 TABLET | Refills: 3 | Status: SHIPPED | OUTPATIENT
Start: 2020-10-13 | End: 2021-09-17

## 2020-10-13 RX ORDER — TORSEMIDE 20 MG/1
TABLET ORAL
Qty: 90 TABLET | Refills: 11 | Status: SHIPPED | OUTPATIENT
Start: 2020-10-13 | End: 2020-10-13 | Stop reason: SDUPTHER

## 2020-12-14 PROBLEM — I10 HTN (HYPERTENSION), BENIGN: Status: ACTIVE | Noted: 2020-12-14

## 2020-12-15 ENCOUNTER — TELEMEDICINE (OUTPATIENT)
Dept: CARDIOLOGY CLINIC | Facility: CLINIC | Age: 85
End: 2020-12-15
Payer: COMMERCIAL

## 2020-12-15 VITALS
OXYGEN SATURATION: 97 % | DIASTOLIC BLOOD PRESSURE: 56 MMHG | WEIGHT: 162 LBS | BODY MASS INDEX: 32.72 KG/M2 | HEART RATE: 55 BPM | SYSTOLIC BLOOD PRESSURE: 128 MMHG

## 2020-12-15 DIAGNOSIS — Z95.2 HISTORY OF AORTIC VALVE REPLACEMENT: ICD-10-CM

## 2020-12-15 DIAGNOSIS — I50.42 CHRONIC COMBINED SYSTOLIC AND DIASTOLIC HEART FAILURE (HCC): Primary | ICD-10-CM

## 2020-12-15 DIAGNOSIS — I10 HTN (HYPERTENSION), BENIGN: ICD-10-CM

## 2020-12-15 DIAGNOSIS — E78.2 MIXED HYPERLIPIDEMIA: ICD-10-CM

## 2020-12-15 PROCEDURE — 99214 OFFICE O/P EST MOD 30 MIN: CPT | Performed by: INTERNAL MEDICINE

## 2021-01-08 ENCOUNTER — TELEPHONE (OUTPATIENT)
Dept: CARDIOLOGY CLINIC | Facility: CLINIC | Age: 86
End: 2021-01-08

## 2021-01-08 NOTE — TELEPHONE ENCOUNTER
Daughter Juwan Kruse called  Reports the nurse from the insurance company who comes once a year, was at the home today and this nurse advised her to call due to ankle edema  Juwan Kruse does not think the edema is any worse than usual   Earlier this week, pt's weight was up to 167, from 164 which has been her norm since the holidays  Juwan Kruse gave her one dose of metolazone and gave her an extra torsemide tab  Normal dose of torsemide is 40 AM 20 PM and she gave her 40 and 40 one day along with the metolazone  Wt now back down to 164  She is not sob, no abdominal bloating  Daughter asked to clarify what she should do over the weekend if weight goes back up  Is it ok to give another metolazone? How often can she do so? Give extra torsemide again? Last labs on 10/1  Cr 1 56, K 3 7    Please advise

## 2021-01-20 DIAGNOSIS — Z23 ENCOUNTER FOR IMMUNIZATION: ICD-10-CM

## 2021-01-25 ENCOUNTER — TELEPHONE (OUTPATIENT)
Dept: CARDIOLOGY CLINIC | Facility: CLINIC | Age: 86
End: 2021-01-25

## 2021-01-25 NOTE — TELEPHONE ENCOUNTER
Pt's daughter called, asked if she should have labs drawn  Last BMP was in October  She has had to give a metolazone dose twice since January 8, and now has been maintaining her weight and seems ok volume morgan  Daughter Corinne Hail, said mom seems a little "wifty and slightly off"  Not dramatic, but asked about lab work  She has also reached out to the PCP regarding her concerns  Please advise

## 2021-01-26 DIAGNOSIS — I50.22 CHRONIC SYSTOLIC CHF (CONGESTIVE HEART FAILURE), NYHA CLASS 2 (HCC): Primary | ICD-10-CM

## 2021-01-26 DIAGNOSIS — I10 ESSENTIAL HYPERTENSION: Chronic | ICD-10-CM

## 2021-01-26 RX ORDER — ISOSORBIDE MONONITRATE 30 MG/1
TABLET, EXTENDED RELEASE ORAL
Qty: 90 TABLET | Refills: 3 | Status: SHIPPED | OUTPATIENT
Start: 2021-01-26 | End: 2022-01-21

## 2021-01-29 ENCOUNTER — TELEMEDICINE (OUTPATIENT)
Dept: SURGICAL ONCOLOGY | Facility: CLINIC | Age: 86
End: 2021-01-29
Payer: COMMERCIAL

## 2021-01-29 DIAGNOSIS — Z85.3 HISTORY OF MALIGNANT NEOPLASM OF BREAST: Chronic | ICD-10-CM

## 2021-01-29 DIAGNOSIS — Z08 ENCOUNTER FOR FOLLOW-UP EXAMINATION AFTER COMPLETED TREATMENT FOR MALIGNANT NEOPLASM: Primary | ICD-10-CM

## 2021-01-29 PROCEDURE — 99213 OFFICE O/P EST LOW 20 MIN: CPT | Performed by: NURSE PRACTITIONER

## 2021-01-29 NOTE — TELEPHONE ENCOUNTER
BW completed @ [facility]  Daughter concerned creatinine 2 25   Lipids elevated Please advise  Teri# [phone]

## 2021-01-29 NOTE — PROGRESS NOTES
Virtual Regular Visit      Assessment/Plan:    1  Encounter for follow-up examination after completed treatment for malignant neoplasm      2  History of malignant neoplasm of breast  - 6 mo f/u visit      Problem List Items Addressed This Visit        Other    Encounter for follow-up examination after completed treatment for malignant neoplasm - Primary    History of malignant neoplasm of breast (Chronic)               Reason for visit is breast cancer follow up  Chief Complaint   Patient presents with    Breast Cancer     follow up    Virtual Regular Visit        Encounter provider Curtis Mcwilliams, [facility]    Provider located at [address] [address]      Recent Visits  No visits were found meeting these conditions  Showing recent visits within past 7 days and meeting all other requirements     Today's Visits  Date Type Provider Dept   01/29/21 [facility] [facility] [address], [address], [address] today's visits and meeting all other requirements     Future Appointments  No visits were found meeting these conditions  Showing future appointments within next 150 days and meeting all other requirements        The patient was identified by name and date of birth  Elli Rosales was informed that this is a telemedicine visit and that the visit is being conducted through [address] [place] [address] and patient was informed that this is not a secure, HIPAA-compliant platform  She agrees to proceed     My office door was closed  No one else was in the room  She acknowledged consent and understanding of privacy and security of the video platform  The patient has agreed to participate and understands they can discontinue the visit at any time  Patient is aware this is a billable service  Rod Rosales is a 80 y o  female that presents today for a follow up visit           Patient participates in a virtual visit today as a follow-up for her bilateral breast cancer  She prefers not to come into the office secondary to the pandemic and cold weather  She notices no changes on her self breast exam   She had a bilateral mammogram in September which was BI-RADS 2  She denies persistent headaches, back pain or bone pain, cough or shortness of breath, abdominal pain  She does report chronic arthritis  She notes that over the course of the last few months she has had some achiness in her left  Upper arm  Denies swelling or redness  The achiness seems to come and go and seems to be worse later in the day  Diagnosis and Staging: Left breast invasive carcinoma, grade 2 Stage I (T1b, N0, M0) %, NH 65%, HER-2/gemma 3+, margins negative by greater than 2 mmRight breast invasive carcinoma 7 mm in size Stage I (T1b, NX, M0) grade 1 ER/NH HER-2/gemma pending  Closest margin 0 4 mmHeterogeneously dense breast   Treatment History: October 2015: Left breast lumpectomy sentinel lymph node biopsy, right breast lumpectomyArimidex discontinued secondary to arthralgias     Discussion/Summary:  Patient is an 71-year-old female who participates in a virtual visit today as a follow-up for her bilateral breast cancer diagnosed in 2015  Her left breast pathology revealed invasive carcinoma, %, NH 65-70%, her 2 positive  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, %, NH 90 95%, her 2-  She underwent bilateral lumpectomies and sentinel node biopsies  She did take anastrozole for a short time but this was discontinued secondary to arthralgias  She has been on observation  She had a bilateral mammogram  and left breast ultrasound on September 18, 2020 which was BI-RADS 2, category 3 density  Her only complaint today is some left upper arm discomfort  She notices no swelling or redness    I reviewed that this is unlikely to be lymphedema as she only had 2 lymph nodes removed however it would be a possibility  She feels the pain is muscular in nature  I have recommended stretching exercises  She has a upcoming visit with her arthritis doctor as well as her PCP and I encouraged the patient to discuss this complaint with them as well so that a physical exam could be performed  I instructed her to call with worsening symptoms  She offers no other complaints and she notices no changes on her self breast exam   I will plan to see her back in the office in 6 months for another clinical exam or sooner should the need arise  She was instructed to call with any concerns prior to that time  All of her questions were answered today      Past Medical History:   Diagnosis Date    Arthritis     [facility] ([facility] ) 09/08/2015    BILATERAL    Cardiac disease     aortic valve transplant    [facility]     Compression fracture of body of thoracic vertebra ([facility])     CVA (cerebral vascular accident) ([place] [address] )     Diabetes mellitus ([place] [address] )     Disease of thyroid gland     Fibromyalgia, primary     H/O cervical fracture 01/09/2019    Hyperlipidemia     Hypertension     Neuropathy     AZUL (obstructive sleep apnea) 10/19/2019    Pressure injury of skin     Renal disorder     kidney stent    Stroke [facility])        Past Surgical History:   Procedure Laterality Date    AORTIC VALVE SURGERY      BREAST LUMPECTOMY Right 09/08/2015    BREAST LUMPECTOMY Left 09/08/2015    BREAST SURGERY      lumpectomy for breast cancer    CATARACT EXTRACTION      CHOLECYSTECTOMY      FACIAL/NECK BIOPSY Right 9/24/2020    Procedure: EXCISION CHEEK LESION X2 WITH FROZEN SECTION;  Surgeon: Kelly Kelly DO;  Location: [facility] MAIN OR;  Service: Plastics    HYSTERECTOMY  1978    IR THORACENTESIS  2/28/2020    JOINT REPLACEMENT      KIDNEY SURGERY      stent placed for kidney    OTHER SURGICAL HISTORY      abdominal aneurysm surgery    OR ARTHRODESIS POSTERIOR ATLAS-AXIS C1-C2 N/A 5/1/2019    Procedure: C1-C2 lateral mass fixation fusion with possible sublaminar cables;  Surgeon: Allyn Luciano MD;  [facility] MAIN [facility];  Service: Neurosurgery    REPLACEMENT TOTAL KNEE      left        Current Outpatient Medications   Medication Sig Dispense Refill    albuterol (2 5 mg/3 mL) 0 083 % nebulizer solution Take 1 vial (2 5 mg total) by nebulization 3 (three) times a day (Patient not taking: Reported on 12/15/2020) 25 vial 3    albuterol (PROVENTIL HFA,VENTOLIN HFA) 90 mcg/act inhaler Inhale 2 puffs every 4 (four) hours as needed for wheezing      ALLOPURINOL PO Take by mouth daily      calcium carbonate (OS-EMILY) 1250 (500 Ca) MG tablet Take 1 tablet by mouth daily      clopidogrel (PLAVIX) 75 mg tablet Take 1 tablet (75 mg total) by mouth daily 90 tablet 3    colchicine (COLCRYS) 0 6 mg tablet Take 0 6 mg by mouth every other day      docusate sodium (COLACE) 100 mg capsule Take 100 mg by mouth 2 (two) times a day as needed for constipation      gabapentin (NEURONTIN) 300 mg capsule Take 1 capsule (300 mg total) by mouth 3 (three) times a day 60 capsule 0    HYDROcodone-acetaminophen (NORCO) 5-325 mg per tablet Take 1 tablet by mouth every 8 (eight) hours as needed for pain      isosorbide mononitrate (IMDUR) 30 mg 24 hr tablet TAKE 1 TABLET DAILY 90 tablet 3    levothyroxine 112 mcg tablet Take 112 mcg by mouth daily       lidocaine (LIDODERM) 5 % Apply 1 patch topically daily Remove & Discard patch within 12 hours or as directed by MD      methocarbamol (ROBAXIN) 500 mg tablet Take 500 mg by mouth 3 (three) times a day as needed for muscle spasms      metolazone (ZAROXOLYN) 5 mg tablet Take 1 tablet (5 mg total) by mouth as needed (if gains 2lbs in 2 days ) (Patient not taking: Reported on 12/15/2020) 15 tablet 0    metoprolol tartrate (LOPRESSOR) 25 mg tablet Take 1 tablet (25 mg total) by mouth 2 (two) times a day 180 tablet 3    Multiple Vitamin (multivitamin) capsule Take 1 capsule by mouth daily  Multiple Vitamins-Calcium (MULTI-DAY/CALCIUM/EXTRA IRON PO) Take 1 tablet by mouth daily      nitroglycerin (NITROSTAT) 0 4 mg SL tablet       Red Yeast Rice Extract (RED YEAST RICE PO) Take 600 mg by mouth daily      sertraline (ZOLOFT) 25 mg tablet 25 mg daily at bedtime       torsemide (DEMADEX) 20 mg tablet 40mg in am and 20 mg in afternoon 270 tablet 3     No current facility-administered medications for this visit  Allergies   Allergen Reactions    Duloxetine Hcl Other (See Comments) and Hypertension    Duloxetine Hcl      Cymbalta; Hemorrhagic stroke listed as reaction    Escitalopram      Other reaction(s): Urinary Retention    Pregabalin      Other reaction(s): Hypertension    Statins Myalgia    Tramadol      Other reaction(s): Hypertension    Triprolidine-Pse Other (See Comments)    Anastrozole Abdominal Pain    Anastrozole     Antihistamines, Diphenhydramine-Type     Exemestane      Aromasin; Muscle pain & cramps    Lexapro [Escitalopram]      Urinary retention    Lyrica [Pregabalin]      hypertension    Metformin      diarrhea    Oxycodone      Pt unsure      Statins      Muscle pain & cramps    Tramadol      hypertension    Triprolidine-Pseudoephedrine     Metformin Diarrhea       Review of Systems   Constitutional: Negative for activity change, appetite change, chills, fatigue, fever and unexpected weight change  Respiratory: Negative for cough and shortness of breath  Cardiovascular: Negative for chest pain  Gastrointestinal: Negative for abdominal pain, constipation, diarrhea, nausea and vomiting  Musculoskeletal: Positive for arthralgias  Negative for back pain, gait problem and myalgias  Skin: Negative for color change and rash  Neurological: Positive for headaches (  Occasional Since her neck injury)  Negative for dizziness  Hematological: Negative for adenopathy  Psychiatric/Behavioral: Negative for agitation and confusion     All other systems reviewed and are negative  Video Exam    There were no vitals filed for this visit  Physical Exam  Constitutional:       General: She is not in acute distress  Appearance: She is well-developed  She is not diaphoretic  HENT:      Head: Normocephalic and atraumatic  Eyes:      General: No scleral icterus  Neck:      Musculoskeletal: Normal range of motion  Trachea: No tracheal deviation  Pulmonary:      Effort: Pulmonary effort is normal    Musculoskeletal:      Left shoulder: She exhibits normal range of motion  Neurological:      Mental Status: She is alert and oriented to person, place, and time  Psychiatric:         Behavior: Behavior normal          Thought Content: Thought content normal           I spent 25 minutes directly with the patient during this visit, 14 minutes face to face on video chat  VIRTUAL VISIT DISCLAIMER    Sherryle Kelp acknowledges that she has consented to an online visit or consultation  She understands that the online visit is based solely on information provided by her, and that, in the absence of a face-to-face physical evaluation by the physician, the diagnosis she receives is both limited and provisional in terms of accuracy and completeness  This is not intended to replace a full medical face-to-face evaluation by the physician  Sherryle Kelp understands and accepts these terms

## 2021-02-03 DIAGNOSIS — I50.22 CHRONIC SYSTOLIC CHF (CONGESTIVE HEART FAILURE), NYHA CLASS 2 (HCC): Primary | ICD-10-CM

## 2021-02-03 NOTE — PROGRESS NOTES
Ordering BMP as last labs showed bump in Cr to 2 2 and   Told pt to hold on taking any more metolazone (through my Chart)

## 2021-02-05 NOTE — TELEPHONE ENCOUNTER
I thought I replied to this in another message, that labs look dry and would stop taking metolazone for now

## 2021-03-03 ENCOUNTER — APPOINTMENT (EMERGENCY)
Dept: RADIOLOGY | Facility: HOSPITAL | Age: 86
End: 2021-03-03
Payer: COMMERCIAL

## 2021-03-03 ENCOUNTER — HOSPITAL ENCOUNTER (EMERGENCY)
Facility: HOSPITAL | Age: 86
Discharge: HOME | End: 2021-03-03
Attending: EMERGENCY MEDICINE
Payer: COMMERCIAL

## 2021-03-03 VITALS
TEMPERATURE: 98.6 F | HEART RATE: 58 BPM | OXYGEN SATURATION: 100 % | RESPIRATION RATE: 17 BRPM | DIASTOLIC BLOOD PRESSURE: 85 MMHG | SYSTOLIC BLOOD PRESSURE: 153 MMHG
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
## 2021-03-03 LAB
ATRIAL RATE: 64 BPM
P AXIS: 70 DEGREES
PR INTERVAL: 202 MS
QRS AXIS: -83 DEGREES
QRSD INTERVAL: 90 MS
QT INTERVAL: 396 MS
QTC INTERVAL: 408 MS
T WAVE AXIS: 100 DEGREES
VENTRICULAR RATE: 64 BPM

## 2021-03-03 PROCEDURE — 99284 EMERGENCY DEPT VISIT MOD MDM: CPT

## 2021-03-03 PROCEDURE — 99284 EMERGENCY DEPT VISIT MOD MDM: CPT | Performed by: EMERGENCY MEDICINE

## 2021-03-03 PROCEDURE — 93005 ELECTROCARDIOGRAM TRACING: CPT

## 2021-03-03 PROCEDURE — 70450 CT HEAD/BRAIN W/O DYE: CPT

## 2021-03-03 PROCEDURE — 93010 ELECTROCARDIOGRAM REPORT: CPT | Performed by: INTERNAL MEDICINE

## 2021-03-03 NOTE — DISCHARGE INSTRUCTIONS
Ct Head Without Contrast    Result Date: 3/3/2021  Impression: No acute intracranial abnormality  Generalized atrophy appears similar to the prior study  There is advanced microangiopathic change  Old lacunae are present  Workstation performed: [id number]      You seen in the ER today for a fall  Your CT head did not show any acute findings  Please follow-up with your PCP as needed  If you need urgent medical attention please call 911 or seek immediate medical attention in the ER

## 2021-03-03 NOTE — ED ATTENDING ATTESTATION
3/3/2021  INitin MD, saw and evaluated the patient  I have discussed the patient with the resident/non-physician practitioner and agree with the resident's/non-physician practitioner's findings, Plan of Care, and MDM as documented in the resident's/non-physician practitioner's note, except where noted  All available labs and Radiology studies were reviewed  I was present for key portions of any procedure(s) performed by the resident/non-physician practitioner and I was immediately available to provide assistance  At this point I agree with the current assessment done in the Emergency Department  I have conducted an independent evaluation of this patient a history and physical is as follows:    ED Course      Emergency Department Note- Ivy Harris 80 y o  female MRN: [id number]    Unit/Bed#: ED 02 Encounter: [id number]    Ivy Harris is a 80 y o  female who presents with   Chief Complaint   Patient presents with   Josie Abarca Fall     pt had unwitnessed fall  pt states she tripped over her o2 tubing  pt denies headstrike however was found face down  pt is on plavix due to TIAs         History of Present Illness   HPI:  Ivy Harris is a 80 y o  female who presents for evaluation of:  Found on the floor by her family  Patient tripped over her oxygen tubing  Patient is not sure if she struck her head, but was found facedown by her family  Patient is on clopidogrel for TIAs, but is not on AC  Patient notes some left shoulder discomfort  Review of Systems   Constitutional: Negative for chills and fever  HENT: Positive for rhinorrhea (chronic)  Negative for congestion  Respiratory: Negative for cough and shortness of breath  Cardiovascular: Negative for chest pain and palpitations  Gastrointestinal: Negative for nausea and vomiting  Neurological: Positive for headaches  Negative for dizziness  All other systems reviewed and are negative        Historical Information   Past Medical History: Diagnosis Date    Arthritis     Breast cancer [facility] ) 09/08/2015    BILATERAL    Cardiac disease     aortic valve transplant    CHF (congestive heart failure) (HCC)     Compression fracture of body of thoracic vertebra (HCC)     [facility]A [facility]re[facility] vascular accident) [facility] )     Diabetes mellitus ([facility] )     Disease of thyroid gland     Fibromyalgia, primary     H/O cervical fracture 01/09/2019    Hyperlipidemia     Hypertension     Neuropathy     AZUL (obstructive sleep apnea) 10/19/2019    Pressure injury of skin     Renal disorder     kidney stent    Stroke [facility])      Past Surgical History:   Procedure Laterality Date    AORTIC VALVE SURGERY      BREAST LUMPECTOMY Right 09/08/2015    BREAST LUMPECTOMY Left 09/08/2015    BREAST SURGERY      lumpectomy for breast cancer    CATARACT EXTRACTION      CHOLECYSTECTOMY      FACIAL/NECK BIOPSY Right 9/24/2020    Procedure: EXCISION CHEEK LESION X2 WITH FROZEN SECTION;  Surgeon: Clarke Cohen DO;  Location: [facility] MAIN OR;  Service: Plastics    HYSTERECTOMY  1978    IR THORACENTESIS  2/28/2020    JOINT REPLACEMENT      KIDNEY SURGERY      stent placed for kidney    OTHER SURGICAL HISTORY      abdominal aneurysm surgery    [facility] ARTHRODESIS POSTERIOR ATLAS-AXIS C1-C2 N/A 5/1/2019    Procedure: C1-C2 lateral mass fixation fusion with possible sublaminar cables;  Surgeon: Ismael Jenkins MD;  Location:  MAIN OR;  Service: Neurosurgery    REPLACEMENT TOTAL KNEE      left      Social History   Social History     Substance and Sexual Activity   Alcohol Use Yes    Frequency: Monthly or less    Drinks per session: 1 or 2    Binge frequency: Less than monthly     Social History     Substance and Sexual Activity   Drug Use Never     Social History     Tobacco Use   Smoking Status Never Smoker   Smokeless Tobacco Never Used     Family History: non-contributory    Meds/Allergies   all medications and allergies reviewed  Allergies   Allergen Reactions    Duloxetine Hcl Other (See Comments) and Hypertension    Duloxetine Hcl      Cymbalta; Hemorrhagic stroke listed as reaction    Escitalopram      Other reaction(s): Urinary Retention    Pregabalin      Other reaction(s): Hypertension    Statins Myalgia    Tramadol      Other reaction(s): Hypertension    Triprolidine-Pse Other (See Comments)    Anastrozole Abdominal Pain    Anastrozole     Antihistamines, Diphenhydramine-Type     Exemestane      Aromasin; Muscle pain & cramps    Lexapro [Escitalopram]      Urinary retention    Lyrica [Pregabalin]      hypertension    Metformin      diarrhea    Oxycodone      Pt unsure      Statins      Muscle pain & cramps    Tramadol      hypertension    Triprolidine-Pseudoephedrine     Metformin Diarrhea       Objective   First Vitals:   Blood Pressure: 148/76 (03/03/21 0034)  Pulse: 68 (03/03/21 0034)  Temperature: 98 6 °F (37 °C) (03/03/21 0103)  Respirations: 18 (03/03/21 0034)  SpO2: 96 % (03/03/21 0034)    Current Vitals:   Blood Pressure: 148/76 (03/03/21 0034)  Pulse: 68 (03/03/21 0034)  Temperature: 98 6 °F (37 °C) (03/03/21 0103)  Respirations: 18 (03/03/21 0034)  SpO2: 96 % (03/03/21 0034)    No intake or output data in the 24 hours ending 03/03/21 0117    Invasive Devices     None                 Physical Exam  Vitals signs and nursing note reviewed  Constitutional:       Appearance: Normal appearance  HENT:      Head:        Right Ear: External ear normal       Left Ear: External ear normal       Mouth/Throat:      Mouth: Mucous membranes are moist       Pharynx: Oropharynx is clear  Eyes:      Conjunctiva/sclera: Conjunctivae normal       Pupils: Pupils are equal, round, and reactive to light  Neck:      Musculoskeletal: Normal range of motion and neck supple  Cardiovascular:      Rate and Rhythm: Normal rate and regular rhythm  Pulmonary:      Effort: Pulmonary effort is normal  No respiratory distress     Abdominal:      General: Bowel sounds are normal       Palpations: Abdomen is soft  Musculoskeletal:         General: Tenderness (L shoulder) present  Arms:    Skin:     General: Skin is warm and dry  Capillary Refill: Capillary refill takes less than 2 seconds  Neurological:      General: No focal deficit present  Mental Status: She is alert and oriented to person, place, and time  Psychiatric:         Mood and Affect: Mood normal          Behavior: Behavior normal            Medical Decision Makin  Fall with head strike on AP medication: CTH    No results found for this or any previous visit (from the past 36 hour(s))  CT head without contrast    (Results Pending)         Portions of the record may have been created with voice recognition software  Occasional wrong word or "sound a like" substitutions may have occurred due to the inherent limitations of voice recognition software  Read the chart carefully and recognize, using context, where substitutions have occurred            Critical Care Time  Procedures

## 2021-03-04 ENCOUNTER — TELEPHONE (OUTPATIENT)
Dept: CARDIOLOGY CLINIC | Facility: CLINIC | Age: 86
End: 2021-03-04

## 2021-03-04 NOTE — TELEPHONE ENCOUNTER
Daughter called said pt has had 4 lb wt gain in the last 3 days  Lab 2/26/21 Cr1  86( 2 25 on 1/29/) K -4 2( 3 5)    Taking Torsemide 40 mg am 20 mg afternoon  Please advise    Also she fell several days again, went to the ER  CT head WNL  Daughter wants to know if it can be cardiac related  She will call PCP to get Urine test( smells) ordered

## 2021-03-04 NOTE — TELEPHONE ENCOUNTER
Advised  Verbally understood  They don't know how she fell, Pt does not remember anything  And family does not know, she has been doing odd things lately per daughter

## 2021-03-31 ENCOUNTER — IMMUNIZATIONS (OUTPATIENT)
Dept: FAMILY MEDICINE CLINIC | Facility: HOSPITAL | Age: 86
End: 2021-03-31

## 2021-03-31 DIAGNOSIS — Z23 ENCOUNTER FOR IMMUNIZATION: Primary | ICD-10-CM

## 2021-03-31 PROCEDURE — 91300 SARS-COV-2 / COVID-19 MRNA VACCINE (PFIZER-BIONTECH) 30 MCG: CPT

## 2021-03-31 PROCEDURE — 0001A SARS-COV-2 / COVID-19 MRNA VACCINE (PFIZER-BIONTECH) 30 MCG: CPT

## 2021-04-21 ENCOUNTER — IMMUNIZATIONS (OUTPATIENT)
Dept: FAMILY MEDICINE CLINIC | Facility: HOSPITAL | Age: 86
End: 2021-04-21

## 2021-04-21 DIAGNOSIS — Z23 ENCOUNTER FOR IMMUNIZATION: Primary | ICD-10-CM

## 2021-04-21 PROCEDURE — 91300 SARS-COV-2 / COVID-19 MRNA VACCINE (PFIZER-BIONTECH) 30 MCG: CPT

## 2021-04-21 PROCEDURE — 0002A SARS-COV-2 / COVID-19 MRNA VACCINE (PFIZER-BIONTECH) 30 MCG: CPT

## 2021-05-03 NOTE — PROGRESS NOTES
Heart Failure Outpatient Progress Note - Felipe Henderson 80 y o  female MRN: [id number]    @ Encounter: [id number]      Assessment/Plan:    Patient Active Problem List    Diagnosis Date Noted    HTN (hypertension), benign 12/14/2020    Chronic gout without tophus 09/30/2020    Toxic metabolic encephalopathy 90/89/2724    Aspiration pneumonia due to regurgitated food ([facility] ) 09/29/2020    Macrocytic anemia 09/29/2020    Nephrosclerosis arteriolar, stage 1-4 or unspecified chronic kidney disease 05/29/2020    Encounter for follow-up examination after completed treatment for malignant neoplasm 05/19/2020    Seasonal allergic rhinitis due to pollen 03/24/2020    Wheezing 11/04/2019    Stage 4 chronic kidney disease ([facility] ) 10/29/2019    Alkalosis 10/29/2019    Chronic respiratory failure with hypoxia ([facility] ) 10/21/2019    Chronic anemia 10/19/2019    SDH (subdural hematoma) (HCC) 10/19/2019    AZUL (obstructive sleep apnea) 10/19/2019    H/O spinal fusion 07/23/2019    Hyponatremia 01/21/2019    Chronic combined systolic and diastolic heart failure ([facility] ) 01/17/2019    Depression 01/17/2019    Essential hypertension 01/09/2019    Controlled type 2 diabetes mellitus with diabetic neuropathy, without long-term current use of insulin ([facility] ) 10/24/2017    SLE (systemic lupus erythematosus) ([facility] ) 10/24/2017    Vitamin D deficiency 08/22/2017    Renal artery stenosis ([facility] ) 02/08/2017    Peripheral polyneuropathy 02/07/2017    Hypothyroidism 09/23/2016    H/O: CVA (cerebrovascular accident) 09/23/2016    History of malignant neoplasm of breast 09/23/2016    History of aortic valve replacement 07/21/2015    RA (rheumatoid arthritis) ([facility] ) 07/21/2015    Hyperlipidemia 10/01/2013    Osteoporosis 10/01/2013    Morbid obesity ([facility] ) 10/12/2012       # Chronic HFrEF, stage C, NYHA III  Etiology: given persistent wall motion abnormalities on echo must rule out ischemia (previously hesitant given CKD), vs HTN vs stress myopathy though typical in motion atypical in that echo in August was showing similar wall motion abnormalities  Offered C, family and pt preferred more comfort approach, less invasive     Weight: 162 lbs down from 184 lbs  When admitted in August recorded at 220 lbs  NT proBNP 9/29/20: 17,640  3/4/20: 16,419  11/4/19: 27,700     Studies- personally reviewed by me     CXR 2/26: moderate right sided pleural effusion  Thoracentesis 2/28/20: 1 liter fluid from right side     EKG: SR, low voltage     Echo 3/13/20: EF around 30%, dyskinesis of mid apical anterior and mid anterolateral walls  PASP: 50 mmHg  MR: Severe      Echocardiogram 10/22/19  LVEF: 40%, akinesis of distal half of anterior, lateral and inferior walls with akinesis of distal 3rd of septum, akinetic apex- looked like   Takotsubo  Grade 2 DD  LVIDd: 4 7 cm  RV: normal  MR: moderate to severe  PASP:  RVOT:   Other: moderate TR     Echo 8/17/19  LVEF: 45%, same wall motion abnormalities as above- but not nearly as severe               Lab Results   Component Value Date     NTBNP 27,700 (H) 11/04/2019         Neurohormonal Blockade:  --Beta-Blocker: metoprolol tartrate 25 mg BID  --ACEi, ARB or ARNi: CKD4  Imdur 30 mg daily  --Aldosterone Receptor Blocker:  --Diuretic: torsemide 40 mg AM, 20 mg PM- not on potassium and last K was 4 6     Sudden Cardiac Death Risk Reduction:  --ICD:      Electrical Resynchronization:  --Candidacy for BiV device:     Advanced Therapies (if appropriate):   --Inotrope:  --LVAD/Transplant Candidacy:     # right sided pleural effusion 2/26 CXR  Right thoracentesis 2/28: 1 liter  # CKD3, Cr 1 56 on 10/1/20, went up to 2 2, but now 1 72 on 4/2/21  GFR 26  # subdural hematoma  # AZUL- non compliant with treatment  # HTN, hx of DONAVAN stenosis and stenting  - Plavix  # hyperlipidemia  4/2/21: Tri 588, HDL 45  # DM2  # anemia, , HgB 8 7 on 10/1/20, up to 10 9 on 4/2/21  # Hx of CVA x 2- mild right leg weakness  # hx of bioprosthetic AVR  # gait dysfunction- not very mobile  Uses walker     Today's Plan:   start fibric acid for high triglycerides  Continue torsemide 40 mg AM, 20 mg PM  Continue metoprolol  --2g sodium diet   Weights daily     HPI:      81 yo female who is being evaluated for HFrEF  She has a history of bioprosthetic AVR, previous HFpEF, HTN, AZUL not using CPAP but uses oxygen at night, hyperlipidemia, hx of CVA, DM2  Use to see Dr Cooper Jean at [facility]  She was in [facility] following subdural hematoma from a fall  She became acutely short of breath requiring BiPap for stabilization  ABG did shows CO2 retention       Previously admitted in August to [facility] for diastolic heart failure  She diuresed around 35 lbs  This was her first admit for heart failure  Discharged at 192 lbs, admitted at 220 lbs     Last visit 2/25/20: Urgent visit today for decreased oxygen saturations at home but states weights are not home  Daughter also said difficult to arouse when sleeping  She has hx of CO2 retention 10/21/19 ABG with CO2 to 81 requiring BiPap, required steroids, and nebs  It was in this setting she had Takotsubo pattern CM  She did not want to pursue LHC and could not pursue sleep study  Also could not tolerate nuclear stress test        In hospital in Sept with aspiration PNA due to regurgitated food    Interim:  Cr up to 2 2 in Feb, told her to hold taking any more metolazone   Called 3/4 with weight gain of 4 lbs, also fell and hit head with normal CT head  Review of Systems   Constitutional: Negative for activity change, appetite change, fatigue and unexpected weight change  HENT: Negative for congestion and nosebleeds  Eyes: Negative  Respiratory: Negative for cough, chest tightness and shortness of breath  Cardiovascular: Negative for chest pain, palpitations and leg swelling  Gastrointestinal: Negative for abdominal distention  Endocrine: Negative  Genitourinary: Negative      Musculoskeletal: Negative  Skin: Negative  Neurological: Negative for dizziness, syncope and weakness  Hematological: Negative  Psychiatric/Behavioral: Negative  Past Medical History:   Diagnosis Date    Arthritis     Breast cancer ([place] [address] ) 09/08/2015    BILATERAL    Cardiac disease     aortic valve transplant    [facility]     Compression fracture of body of thoracic vertebra ([facility]     [facility] vascular accident) ([facility] )     Diabetes mellitus ([place] [address] )     Disease of thyroid gland     Fibromyalgia, primary     H/O cervical fracture 01/09/2019    Hyperlipidemia     Hypertension     Neuropathy     AZUL (obstructive sleep apnea) 10/19/2019    Pressure injury of skin     Renal disorder     kidney stent    Stroke ([facility]          Allergies   Allergen Reactions    Duloxetine Hcl Other (See Comments) and Hypertension    Duloxetine Hcl      Cymbalta; Hemorrhagic stroke listed as reaction    Escitalopram      Other reaction(s): Urinary Retention    Pregabalin      Other reaction(s): Hypertension    Statins Myalgia    Tramadol      Other reaction(s): Hypertension    Triprolidine-Pse Other (See Comments)    Anastrozole Abdominal Pain    Anastrozole     Antihistamines, Diphenhydramine-Type     Exemestane      Aromasin; Muscle pain & cramps    Lexapro [Escitalopram]      Urinary retention    Lyrica [Pregabalin]      hypertension    Metformin      diarrhea    Oxycodone      Pt unsure      Statins      Muscle pain & cramps    Tramadol      hypertension    Triprolidine-Pseudoephedrine     Metformin Diarrhea           Current Outpatient Medications:     albuterol (2 5 mg/3 mL) 0 083 % nebulizer solution, Take 1 vial (2 5 mg total) by nebulization 3 (three) times a day (Patient not taking: Reported on 12/15/2020), Disp: 25 vial, Rfl: 3    albuterol (PROVENTIL HFA,VENTOLIN HFA) 90 mcg/act inhaler, Inhale 2 puffs every 4 (four) hours as needed for wheezing, Disp: , Rfl:    ALLOPURINOL PO, Take by mouth daily, Disp: , Rfl:     calcium carbonate (OS-EMILY) 1250 (500 Ca) MG tablet, Take 1 tablet by mouth daily, Disp: , Rfl:     clopidogrel (PLAVIX) 75 mg tablet, Take 1 tablet (75 mg total) by mouth daily, Disp: 90 tablet, Rfl: 3    colchicine (COLCRYS) 0 6 mg tablet, Take 0 6 mg by mouth every other day, Disp: , Rfl:     docusate sodium (COLACE) 100 mg capsule, Take 100 mg by mouth 2 (two) times a day as needed for constipation, Disp: , Rfl:     gabapentin (NEURONTIN) 300 mg capsule, Take 1 capsule (300 mg total) by mouth 3 (three) times a day, Disp: 60 capsule, Rfl: 0    HYDROcodone-acetaminophen (NORCO) 5-325 mg per tablet, Take 1 tablet by mouth every 8 (eight) hours as needed for pain, Disp: , Rfl:     isosorbide mononitrate (IMDUR) 30 mg 24 hr tablet, TAKE 1 TABLET DAILY, Disp: 90 tablet, Rfl: 3    levothyroxine 112 mcg tablet, Take 112 mcg by mouth daily , Disp: , Rfl:     lidocaine (LIDODERM) 5 %, Apply 1 patch topically daily Remove & Discard patch within 12 hours or as directed by MD, Disp: , Rfl:     methocarbamol (ROBAXIN) 500 mg tablet, Take 500 mg by mouth 3 (three) times a day as needed for muscle spasms, Disp: , Rfl:     metolazone (ZAROXOLYN) 5 mg tablet, Take 1 tablet (5 mg total) by mouth as needed (if gains 2lbs in 2 days ) (Patient not taking: Reported on 12/15/2020), Disp: 15 tablet, Rfl: 0    metoprolol tartrate (LOPRESSOR) 25 mg tablet, Take 1 tablet (25 mg total) by mouth 2 (two) times a day, Disp: 180 tablet, Rfl: 3    Multiple Vitamin (multivitamin) capsule, Take 1 capsule by mouth daily, Disp: , Rfl:     Multiple Vitamins-Calcium (MULTI-DAY/CALCIUM/EXTRA IRON PO), Take 1 tablet by mouth daily, Disp: , Rfl:     nitroglycerin (NITROSTAT) 0 4 mg SL tablet, , Disp: , Rfl:     Red Yeast Rice Extract (RED YEAST RICE PO), Take 600 mg by mouth daily, Disp: , Rfl:     sertraline (ZOLOFT) 25 mg tablet, 25 mg daily at bedtime , Disp: , Rfl:     torsemide (DEMADEX) 20 mg tablet, 40mg in am and 20 mg in afternoon, Disp: 270 tablet, Rfl: 3    Social History     Socioeconomic History    Marital status:       Spouse name: Not on file    Number of children: 3    Years of education: Not on file    Highest education level: Not on file   Occupational History    Not on file   Social Needs    Financial resource strain: Not on file    Food insecurity     Worry: Not on file     Inability: Not on file    Transportation needs     Medical: Not on file     Non-medical: Not on file   Tobacco Use    Smoking status: Never Smoker    Smokeless tobacco: Never Used   Substance and Sexual Activity    Alcohol use: Yes     Frequency: Monthly or less     Drinks per session: 1 or 2     Binge frequency: Less than monthly    Drug use: Never    Sexual activity: Not Currently   Lifestyle    Physical activity     Days per week: Not on file     Minutes per session: Not on file    Stress: Not on file   Relationships    Social connections     Talks on phone: Not on file     Gets together: Not on file     Attends Restorationist service: Not on file     Active member of club or organization: Not on file     Attends meetings of clubs or organizations: Not on file     Relationship status: Not on file    Intimate partner violence     Fear of current or ex partner: Not on file     Emotionally abused: Not on file     Physically abused: Not on file     Forced sexual activity: Not on file   Other Topics Concern    Not on file   Social History Narrative    ** Merged History Encounter **            Family History   Problem Relation Age of Onset    Heart disease Mother     Arthritis Mother     Diabetes Mother     Heart failure Father     Arthritis Father     Other Father         enlargement of prostate     Dementia Father     No Known Problems Daughter     No Known Problems Maternal Grandmother     No Known Problems Maternal Grandfather     No Known Problems Paternal Grandmother    [facility] No Known Problems Paternal Grandfather     No Known Problems Maternal Aunt     No Known Problems Maternal Aunt     No Known Problems Maternal Aunt     No Known Problems Maternal Aunt     No Known Problems Paternal Aunt     No Known Problems Paternal Aunt     Prostate cancer Son     Prostate cancer Son        Physical Exam:    Vitals:   Vitals:    05/04/21 1318   BP: 110/52   Pulse: 58   SpO2: 97%         Physical Exam  Constitutional:       Appearance: She is well-developed  HENT:      Head: Normocephalic and atraumatic  Eyes:      Pupils: Pupils are equal, round, and reactive to light  Neck:      Musculoskeletal: Normal range of motion  Vascular: No JVD  Cardiovascular:      Rate and Rhythm: Normal rate and regular rhythm  Heart sounds: No murmur  Pulmonary:      Effort: Pulmonary effort is normal  No respiratory distress  Breath sounds: Normal breath sounds  Abdominal:      General: There is no distension  Palpations: Abdomen is soft  Tenderness: There is no abdominal tenderness  Musculoskeletal: Normal range of motion  Skin:     General: Skin is warm and dry  Findings: No rash  Neurological:      Mental Status: She is alert and oriented to person, place, and time           Labs & Results:    Lab Results   Component Value Date    SODIUM 138 10/01/2020    K 3 7 10/01/2020     10/01/2020    CO2 32 10/01/2020    BUN 48 (H) 10/01/2020    CREATININE 1 56 (H) 10/01/2020    GLUC 95 10/01/2020    CALCIUM 9 1 10/01/2020     Lab Results   Component Value Date    WBC 5 56 10/01/2020    HGB 8 7 (L) 10/01/2020    HCT 27 9 (L) 10/01/2020     (H) 10/01/2020     (L) 10/01/2020     Lab Results   Component Value Date    NTBNP 17,640 (H) 09/29/2020      Lab Results   Component Value Date    CHOLESTEROL 144 10/23/2019    CHOLESTEROL 204 (H) 08/16/2019    CHOLESTEROL 193 04/09/2019     Lab Results   Component Value Date    HDL 33 (L) 10/23/2019    HDL 56 08/16/2019    HDL 48 04/09/2019     Lab Results   Component Value Date    TRIG 223 (H) 10/23/2019    TRIG 99 08/16/2019    TRIG 397 (H) 04/09/2019     Lab Results   Component Value Date    NONHDLC 111 10/23/2019    [place] 148 08/16/2019    [place] 145 04/09/2019       EKG personally reviewed by Worthy Cockayne, DO  Counseling / Coordination of Care  Time spent today 25 minutes  Greater than 50% of total time was spent with the patient and / or family counseling and / or coordination of care  We went over current diagnosis, most recent studies and any changes in treatment  Thank you for the opportunity to participate in the care of this patient      [address] [facility] PULMONARY HYPERTENSION  MEDICAL DIRECTOR OF [place]

## 2021-05-04 ENCOUNTER — OFFICE VISIT (OUTPATIENT)
Dept: CARDIOLOGY CLINIC | Facility: CLINIC | Age: 86
End: 2021-05-04
Payer: COMMERCIAL

## 2021-05-04 VITALS
HEART RATE: 58 BPM | SYSTOLIC BLOOD PRESSURE: 110 MMHG | DIASTOLIC BLOOD PRESSURE: 52 MMHG | WEIGHT: 163.1 LBS | OXYGEN SATURATION: 97 % | HEIGHT: 59 IN | BODY MASS INDEX: 32.88 KG/M2

## 2021-05-04 DIAGNOSIS — I50.42 CHRONIC COMBINED SYSTOLIC AND DIASTOLIC HEART FAILURE (HCC): Primary | ICD-10-CM

## 2021-05-04 DIAGNOSIS — I42.8 NICM (NONISCHEMIC CARDIOMYOPATHY) (HCC): ICD-10-CM

## 2021-05-04 DIAGNOSIS — E78.2 MIXED HYPERLIPIDEMIA: ICD-10-CM

## 2021-05-04 DIAGNOSIS — E78.1 HYPERTRIGLYCERIDEMIA: ICD-10-CM

## 2021-05-04 DIAGNOSIS — I10 HTN (HYPERTENSION), BENIGN: ICD-10-CM

## 2021-05-04 PROCEDURE — 1160F RVW MEDS BY RX/DR IN RCRD: CPT | Performed by: INTERNAL MEDICINE

## 2021-05-04 PROCEDURE — 99214 OFFICE O/P EST MOD 30 MIN: CPT | Performed by: INTERNAL MEDICINE

## 2021-05-04 RX ORDER — FENOFIBRATE 48 MG/1
48 TABLET, COATED ORAL DAILY
Qty: 30 TABLET | Refills: 5 | Status: SHIPPED | OUTPATIENT
Start: 2021-05-04 | End: 2021-05-04 | Stop reason: CLARIF

## 2021-05-04 RX ORDER — FENOFIBRATE 48 MG/1
48 TABLET, COATED ORAL DAILY
Qty: 30 TABLET | Refills: 4 | Status: SHIPPED | OUTPATIENT
Start: 2021-05-04 | End: 2021-09-03 | Stop reason: SDUPTHER

## 2021-05-15 DIAGNOSIS — Z86.73 H/O: CVA (CEREBROVASCULAR ACCIDENT): Chronic | ICD-10-CM

## 2021-05-15 RX ORDER — CLOPIDOGREL BISULFATE 75 MG/1
TABLET ORAL
Qty: 90 TABLET | Refills: 3 | Status: SHIPPED | OUTPATIENT
Start: 2021-05-15 | End: 2022-05-10

## 2021-05-17 NOTE — H&P
17-May-2021 16:36 PHYSICAL MEDICINE AND REHABILITATION H&P/ADMISSION NOTE  Qian Jeffers 80 y o  female MRN: [id number]  Unit/Bed#: -01 Encounter: [id number]     Rehab Diagnosis: Impairment of mobility, safety and Activities of Daily Living (ADLs) due to Orthopedic Disorders:  08 9  Other Orthopedic Type 3 Odontoid fracture and C6 nondisplaced fracture after fall    History of Present Illness:   Qian Jeffers is a 80 y o  female who presented to the [address] [place] [address] as a trauma following a fall on Plavix  She utilizes a SPC at baseline, and had hit a wet patch on the floor causing the cane to slip from under her  Her PMH is significant for HTN with history of CVA, AVR, HTN, and T2DM  CT C-Spine revealed a Type 3 Odontoid fracture extending to the R and L superior articular facets and L transverse foramen  She also had an acute non-displaced C6 fracture  CTA Neck was negative for aneurysm or dissection and CTH was negative for acute intracranial process  Neurosurgery evaluated the patient and recommended non-operative management  She was neurologically intact  Geriatric Medicine was consulted, who noted she had a normal mini cog, but some forgetfulness at baseline  There were no significant medications noted that would contribute to her fall  Her course was complicated by pain, for which she was placed on opioids, scheduled tylenol, robaxin, and lidoderm patches  She had constipation due to her opioid use (last BM was 1/16/19) and placed on a bowel regimen of senna/colace  She had some mild fluid overload treated with Lasix, and hypertension for which they increased her home amlodipine to 10mg  She also had episodes of sundowning and hospital delirium at night  She was treated with delirium precautions - there was no evidence of an organic cause (such as an infection) contributing to her delirium  She was evaluated by PT/OT and determined to be appropriate for discharge to the [facility] on 1/17/19  Subjective: At this time, her son Julien Byrd is in the room with her  He notes that her cognitive status seems at baseline and she has not had any further episodes of confusion  Her pain is moderately controlled on her current regimen of oxycodone  She uses 2 5mg as needed  Of note, at home she was on Norco (Hydrocodone/APAP 5-300mg) which she had weaned herself down from 3x a day to once a day  These are prescribed by Dr Meenakshi Rubalcava who manages her diffuse arthritis pain (lower back, legs, hands)  She denies any bowel issues, and has urinary incontinence on occasion  She states she likes to hold it for as long as possible, but this at times results in urgency and incontinence  This happens rarely, according to her son  She denies any difficulty sleeping  She uses reading glasses at baseline  She does not use hearing aids  She does have dentures  She denies difficulty swallowing  She has not had any CP or difficulty breathing  She does have a history of an aortic valve replacement and CHF  Her son states her legs appear much less swollen than they have in the past  She denies any N/V/abdominal pain  She has baseline diabetic neuropathy which isn't painful, but makes her feet feel like "cement"  She has noticed prior to this fall some clumsiness and decreased fine motor dexterity in her fingers as well  Her daughter does regular feet checks for wounds  She is eager to return home  Review of Systems: A 10-point review of systems was performed  Negative except as listed above  Plan:     History of aortic valve replacement with bioprosthetic valve   Assessment & Plan    Monitor cardiopulmonary status  IM to manage     Fibromyalgia   Assessment & Plan    Continue sertraline  Continue gabapentin (Renally dosed)  Consult neuropsychology for adjustment and coping skills for pain management       Rheumatoid arthritis involving multiple sites [facility])   Assessment & Plan    Continue plaquenil  - Was also on hydrocodone but titrating off at home  - Monitor for flare  - IM consulted     Chronic kidney disease   Assessment & Plan    Crea stable  Check CMP, Mag, Phos in AM  Monitor  Depression   Assessment & Plan    Continue sertraline  Would avoid lorazepam considering confusion  Has not received on inpatient  Type 2 diabetes mellitus with diabetic polyneuropathy ([facility] [address] )   Assessment & Plan    No results found for: HGBA1C    Recent Labs      01/17/19   1216   POCGLU  143*       Blood Sugar Average: Last 72 hrs:  - Checking Hgb A1C  - Starting on CDI  - Starting Accuchecks  - At home diet controlled  - Placed on diabetic diet  - IM to manage     History of CVA (cerebrovascular accident)   Assessment & Plan    No tim sequelae  Cannot tolerate statins  Continue Plavix  Continue fish oil  At home on other supplements as well  Chronic diastolic heart failure (HCC)   Assessment & Plan    With some volume overload early in hospital stay  Now examines euvolemic  Monitor kidney function on lasix   Continue ARB/BB     Renovascular hypertension   Assessment & Plan    Continue increased Amlodipine 10mg  Continues home Losartan 100mg  Continue metoprolol tartrate 25mg Q12  Change hydralazine PRN to PO  S/p stenting for DONAVAN  IM to manage     C6 cervical fracture (HCC)   Assessment & Plan    Non-op management  - Pain control as noted above in Type III odontoid fracture  - PT/OT     Type III fracture of odontoid process [facility])   Assessment & Plan    Please see ambulatory dysfunction  C-Collar ATC   - Pain management - goal to wean off of opioids   - Continue Oxycodone 2 5mg as needed and with breakthrough  - Continue Tylenol ATC  - Continue Robaxin as needed for muscle spasms  - Lidocaine patch  - Cervical precautions  - Will need follow-up with Neurosurgery on 1/28/19          * Ambulatory dysfunction   Assessment & Plan    Patient will participate in a comprehensive inpatient rehab program with 3-5 hours a day 5-7 days a week of PT/OT  To evaluate for any further SLP needs  - Continue pain management  - Fall precautions       # Skin  · Encourage regular turning as patient at risk for skin breakdown  · Staff to continue patient education on Q2h turning     # Bowel  · Patient reports no constipation  · To ensure regular BMs, bowel regimen consisting of:  colace , senna and and as needed miralax     # Bladder  · Patient voiding spontaneously  · Encourage to contact nursing as soon as she feels need to urinate in order to avoid accidents  Will check PVRs x3, if <150cc can be discontinued  May need timed voids  # Pain  · Continue tylenol, for max of 3gm daily  · Continue oxycodone   · Continue lidoderm patch(es)  · Continue gabapentin    # Rehab Psych   · referral to Rehabilitation Psychology    # Other  - Diet/Nutrition:        Diet Orders            Start     Ordered    01/17/19 1658  Diet Regular; Regular House; Sodium 2 GM, Consistent Carbohydrate Diet Level 3 (6 carb servings/90 grams CHO/meal)  Diet effective now     Question Answer Comment   Diet Type Regular    Regular Regular House    Other Restriction(s): Sodium 2 GM    Other Restriction(s): Consistent Carbohydrate Diet Level 3 (6 carb servings/90 grams CHO/meal)    RD to adjust diet per protocol? Yes        01/17/19 1657    01/17/19 1654  Dietary nutrition supplements  Once     Question Answer Comment   Select Supplement: Ensure Enlive-Vanilla    Frequency Lunch        01/17/19 1653        - DVT prophy: Sequential compression device (Venodyne)  and Enoxaparin (Lovenox)  - GI ppx: None  - Nausea: None  - Supplements: Fish oil  ON red yeast at home   ON omega 3  - Sleep: None    Disposition: TBD    CODE: Level 1: Full Code   Drug regimen reviewed, all potential adverse effects identified and addressed:    Scheduled Meds:    Current Facility-Administered Medications:  acetaminophen 975 mg Oral Q8H [address] Neris Gilmore MD   albuterol 2 puff Inhalation Q4H PRN Neris Gilmore MD [START ON 1/18/2019] amLODIPine 10 mg Oral Daily Rogelio Espinoza MD   [START ON 1/18/2019] calcium carbonate-vitamin D 1 tablet Oral BID With Meals Rogelio Espinoza MD   [START ON 1/18/2019] clopidogrel 75 mg Oral Daily Rogelio Espinoza MD   docusate sodium 100 mg Oral BID Rogelio Espinoza MD   [START ON 1/18/2019] enoxaparin 40 mg Subcutaneous Q24H [facility] Rogelio Espinoza MD   [START ON 1/18/2019] fish oil 1,000 mg Oral Daily Rogelio Espinoza MD   [START ON 1/18/2019] furosemide 20 mg Oral Daily Rogelio Espinoza MD   [START ON 1/18/2019] gabapentin 300 mg Oral Daily Rogelio Espinoza MD   gabapentin 600 mg Oral HS Rogelio Espinoza MD   hydrALAZINE 5 mg Intravenous Q6H PRN Rogelio Espinoza MD   [START ON 1/18/2019] hydroxychloroquine 200 mg Oral Daily With Breakfast Rogelio Espinoza MD   insulin lispro 1-5 Units Subcutaneous TID [facility] Rogelio Espinoza MD   insulin lispro 1-5 Units Subcutaneous HS Rogelio Espinoza MD   [START ON 1/18/2019] levothyroxine 112 mcg Oral Early Morning Rogelio Espinoza MD   [START ON 1/18/2019] lidocaine 1 patch Topical Daily Rogelio Espinoza MD   [START ON 1/18/2019] lidocaine 1 patch Topical Daily Rogelio Espinoza MD   [START ON 1/18/2019] losartan 100 mg Oral Daily Rogelio Espinoza MD   methocarbamol 500 mg Oral Q6H PRN Rogelio Espinoza MD   metoprolol tartrate 25 mg Oral Q12H [facility] Rogelio Espinoza MD   nystatin  Topical BID Rogelio Espinoza MD   nystatin  Topical BID Rogelio Espinoza MD   ondansetron 4 mg Intravenous Q4H PRN Rogelio Espinoza MD   oxyCODONE 2 5 mg Oral Q4H PRN Rogelio Espinoza MD   oxyCODONE 2 5 mg Oral Q4H PRN Rogelio Espinoza MD   polyethylene glycol 17 g Oral Daily PRN Rogelio Espinoza MD   senna 1 tablet Oral HS Rogelio Espinoza MD   sertraline 25 mg Oral Daily Rogelio Espinoza MD        Restrictions include:  Cervical collar ATC  Fall precautions      Functional History - Prior to Admission:      Functional Status: Patient was independent with mobility/ambulation, transfers, ADL's, IADL's   She needed some help with her iADLs and cogntion      Functional Status Upon Admission to [facility]:  Mobility: Deondre 10' x4 with ambulation  Transfers: Deondre  ADLs: maxA grooming, modA UB bathing, LB bathing, Deondre UB dressing, maxA LB dressing, maxA toileting, Deondre toilet transfers, A with toileting hygiene      Heide Ding lives with their family  She lives in [facility]) single family home  The living area: can live on one level  Equipment in home: Shower Chair, [address], [address]  There is a ramp to enter the home  Patient/family's goals: Return to previous home/apartment  The patient will have 24 hour supervision/physical assistance available upon discharge  Daughter works from home Tues/Thurs, and Granddaughter (adult) can assist/supervise     Physical Exam:  Temp:  [97 9 °F (36 6 °C)-99 °F (37 2 °C)] 98 1 °F (36 7 °C)  HR:  [52-59] 59  Resp:  [16-20] 16  BP: (143-184)/(52-70) 159/70  SpO2:  [96 %-98 %] 96 %    General: alert, no apparent distress, cooperative and comfortable  HEENT:  Head: Normocephalic, no lesions, without obvious abnormality  Eye: Normal external eye, conjunctiva, lids cornea, JUAN  Neck / Thyroid: short neck, in C-collar  No signs of pressure injury on chin/occiput  CARDIAC:  regular rate and rhythm and S1, S2 normal  LUNGS:  normal air entry, lungs clear to auscultation  ABDOMEN:  soft, non-tender  Bowel sounds normal  No masses, no organomegaly  EXTREMITIES:  No pitting edema noted in her bilateral lower extremities  NEURO:   AAOx3 without cuing  SHe has good insight into her deficits, and good judgment  She is not confused at this time  HEr speech is fluent and she answers questions appropriately  She has allodynia in her bilateral legs  She has intact gross touch sensation  Proprioception in her great toes is intact  She has no incoordination/ataxia noted, no fix or grossly disorganized dysdiadochokinesia   She was unable to tolerate reflex exam due to gross tenderness, however, plantor response is flexor bilaterally  Azael's is present bilaterally  She seems upregulated and startles to touch at times  See MMT below  PSYCH:  Alert and oriented, appropriate affect  INTEG: Skin is warm dry, with diffuse mottling in her extremities  She has venous stasis changes in her bilateral lower extremities and a skin tear on the anterior aspect of her right shin  MSK: No gross deformities noted at this time, and bulk appears symmetric  She self limits range of motion in her knees, particularly flexion in the left knee as well as hip flexion  There is a pain limitation component to her LE exam   MMT:   Strength:   Right  Left  Site  Right  Left  Site    5 5  S Ab: Shoulder Abductors  1  1  HF: Hip Flexors    5 5  EF: Elbow Flexors  1-2  1-2 KF: Knee Flexors    5  5  EE: Elbow Extensors   1-2 1-2  KE: Knee Extensors    5  5  WE: Wrist Extensors  5  5  DR: Dorsi Flexors    5  5  FF: Finger Flexors  5  5  PF: Plantar Flexors    5  5  HI: Hand Intrinsics  5  5  EHL: Extensor Hallucis Longus         Laboratory:      Results from last 7 days  Lab Units 01/11/19  0604   HEMOGLOBIN g/dL 10 1*   HEMATOCRIT % 31 6*   WBC Thousand/uL 6 58       Results from last 7 days  Lab Units 01/12/19  0525 01/11/19  0604   BUN mg/dL 20 20   SODIUM mmol/L 134* 134*   POTASSIUM mmol/L 3 6 3 7   CHLORIDE mmol/L 97* 98*   CREATININE mg/dL 0 98 0 97            Wt Readings from Last 1 Encounters:   01/12/19 93 8 kg (206 lb 12 7 oz)     Estimated body mass index is 41 77 kg/m² as calculated from the following:    Height as of this encounter: 4' 11" (1 499 m)  Weight as of 1/12/19: 93 8 kg (206 lb 12 7 oz)  Imaging: reviewed    1/11/19 CXR:   Mild CHF with trace bilateral pleural effusions similar to prior study      1/11/19 XR C-Spine:  1   Grossly stable type III odontoid process fracture as previously characterized    2   Multilevel degenerative change and evidence of diffuse idiopathic skeletal hypertrophy      1/9/19 CTA Neck:  No evidence of aneurysm or dissection  Type 3 dens fracture with fracture component extending to the left C2 transverse foramen  Nondisplaced fracture anterior T6 bridging osteophyte  Areas of atherosclerotic plaque narrowing as described above      1/9/19 CT C-Spine:  1   Type III odontoid fracture   The fracture line extends to the right and left superior articular facets and left transverse foramen of C2   2   Acute nondisplaced fracture through bulky, bridging osteophytes at the C6 level      1/9/19 CT Head:  No acute intracranial abnormality    Rehabilitation Prognosis: good     Tolerance for three hours of therapy a day: good     Family/Patient Goals:  Patient/family's goals: Return to previous home/apartment      Patient will receive PT, OT, and ST 60 minutes each per day, five days per week to achieve rehab goals or participate in 900 minutes of therapy within a 7 day week period       Mobility Goals: Modified [place]  Transfer Goals: Modified [place]  Activities of Daily Living (ADLs) Goals: Supervision / Standby Assist     Discharge Planning:  Rehabilitation and discharge goals discussed with the patient and/or family      Case Managment and Social Work to review patient/family resources and to coordinate Discharge Planning      Estimated length of stay: 7 to 10 days     Patient and Family Education and Training:  Rehabilitation and discharge goals discussed with the patient and/or family  Patient/family education/training needs to be discussed in weekly team meeting      Equipment/DME needs: Therapy teams to assess and evaluate for additional equipment/DME needs throughout rehabilitation stay    Past Medical History:   Past Surgical History:   Family History:   Social history:   Past Medical History:   Diagnosis Date    CHF [facility]congestive heart failure) ([facility] [address] )     Hypertension     Stroke ([facility] )     No past surgical history on file    No family history on file    Social History     Social History    Marital status: /Civil Union     Spouse name: N/A    Number of children: N/A    Years of education: N/A     Social History Main Topics    Smoking status: Unknown If Ever Smoked    Smokeless tobacco: Never Used    Alcohol use No    Drug use: Unknown    Sexual activity: Not on file     Other Topics Concern    Not on file     Social History Narrative    No narrative on file          Current Medical Diagnosis Allergies   Patient Active Problem List   Diagnosis    Closed nondisplaced fracture of second cervical vertebra ([facility] )    Neck pain, acute    Type III fracture [facility] process [facility]    C6 cervical fracture ([facility] )    Hypertension    Hypothyroidism    Fall    Forgetfulness    Ambulatory dysfunction    Physical deconditioning    Acute pain    Delirium    Renovascular hypertension    Chronic diastolic heart failure (HCC)    History of CVA (cerebrovascular accident)    Type 2 diabetes mellitus with diabetic polyneuropathy ([facility])    Depression    Chronic kidney disease    Rheumatoid arthritis involving multiple sites ([facility] )    Fibromyalgia    History of aortic valve replacement with bioprosthetic valve    Allergies   Allergen Reactions    Duloxetine Hcl      Cymbalta; Hemorrhagic stroke listed as reaction    Anastrozole     Exemestane      Aromasin; Muscle pain & cramps    Lexapro [Escitalopram]      Urinary retention    Lyrica [Pregabalin]      hypertension    Metformin      diarrhea    Statins      Muscle pain & cramps    Tramadol      hypertension    Triprolidine-Pseudoephedrine            Medical Necessity Criteria for [facility] Admission:She will need monitoring and management of her cardiopulmonary status, particularly given her episode of volume overload   No echo has been done at this time to definitely diagnose her with heart failure, although she does seem to have borderling enlargement of her cardiac silhouette and she did exhibit volume overload  She is currently on diuretics  She will need close monitoring of her hypertension given her increased activity  She will need close monitoring of her blood glucose and management to prevent both hypo and hyperglycemia given her increased energy expenditure  She will need close monitoring of her pain with appropriate management  She is at risk for delirium given her age and injury, as well as her pain medications so she will need to be monitored for that  She will also need close monitoring of her neurologic function  Finally, she will need management of her bladder/bowel to prevent maceration, and improve continence  She is at risk for pressure injury as well  In addition, the preadmission screen, post-admission physical evaluation, overall plan of care and admissions order demonstrate a reasonable expectation that the following criteria were met at the time of admission to the [facility]  3  The patient requires active and ongoing therapeutic intervention of multiple therapy disciplines (physical therapy, occupational therapy, speech-language pathology, or prosthetics/orthotics), one of which is physical or occupational therapy  4  Patient requires an intensive rehabilitation therapy program, as defined in Chapter 1, section 110 2 2 of the CMS Medicare Policy Manual  This intensive rehabilitation therapy program will consist of at least 3 hours of therapy per day at least 5 days per week or at least 15 hours of intensive rehabilitation therapy within a 7 consecutive day period, beginning with the date of admission to the [facility]  5  The patient is reasonably expected to actively participate in, and benefit significantly from, the intensive rehabilitation therapy program as defined in Chapter 1, section 110 2 2 of the CMS Medicare Policy Manual at this time of admission to the [facility]   She can reasonably be expected to make measurable improvement (that will be of practical value to improve the patients functional capacity or adaptation to impairments) as a result of the rehabilitation treatment, as defined in section 110 3, and such improvement can be expected to be made within the prescribed period of time  As noted in the CMS Medicare Policy Manual, the patient need not be expected to achieve complete independence in the domain of self-care nor be expected to return to his or her prior level of functioning in order to meet this standard  6  The patient must require physician supervision by a rehabilitation physician  As such, a rehabilitation physician will conduct face-to-face visits with the patient at least 3 days per week throughout the patients stay in the [facility] to assess the patient both medically and functionally, as well as to modify the course of treatment as needed to maximize the patients capacity to benefit from the rehabilitation process  7  The patient requires an intensive and coordinated interdisciplinary approach to providing rehabilitation, as defined in Chapter 1, section 110 2 5 of the CMS Medicare Policy Manual  This will be achieved through periodic team conferences, conducted at least once in a 7-day period, and comprising of an interdisciplinary team of medical professionals consisting of: a rehabilitation physician, registered nurse,  and/or , and a licensed/certified therapist from each therapy discipline involved in treating the patient  Changes Since Pre-admission Assessment: None -This patient's participation in rehab continues to be reasonable, necessary and appropriate  CMS Required Post-Admission Physician Evaluation Elements  History and Physical, including medical history, functional history and active comorbidities as in above text      PostAdmission Physician Evaluation:  The patient has the potential to make improvement and is in need of physical, occupational, and/or therapy services   The patient may also need nutritional services  Given the patient's complex medical condition and risk of further medical complications, rehabilitative services cannot be safely provided at a lower level of care, such as a skilled nursing facility  I have reviewed the patient's functional and medical status at the time of the preadmission screening and they are the same as on the day of this admission  I acknowledge that I have personally performed a full physical examination on this patient within 24 hours of admission  The patient and/or family demonstrated understanding the rehabilitation program and the discharge process after we discussed them      Agree in entirety: yes  Minor adaptions: none    Major changes: none     Ishan Herman MD  Physical Medicine and Rehabilitation    ** Please Note: Fluency Direct voice to text software may have been used in the creation of this document   **

## 2021-05-18 ENCOUNTER — TELEPHONE (OUTPATIENT)
Dept: NEPHROLOGY | Facility: CLINIC | Age: 86
End: 2021-05-18

## 2021-05-18 NOTE — TELEPHONE ENCOUNTER
Patient sees Triston Becerril  Patient's daughter Eduardo Thomas is calling because patient is having recurring UTI's  Eduardo Thomas contacted the pcp who asked her to call Urology which patient has not seen yet  She will like advise to see what Dr Merlos thinks she should do  Please contact Eduardo Thomas at [phone]

## 2021-05-19 NOTE — TELEPHONE ENCOUNTER
Left voice mail for aKvon  I have made her aware per Dr Rl De Anda it is okay to start Cipro for UTI  Dr Rl De Anda does also agree if patient has re occurring UTIs a Urology consult will be beneficial on patient's behalf  Patient can also continue to follow with nephrology and pcp regarding this  Kavon has been advised to contact our office regarding any further questions

## 2021-05-19 NOTE — TELEPHONE ENCOUNTER
Patient's daughter called the office back  Patient's daughter Billy Shafer is concerned with the length patient's Cipro is prescribed for  PCP prescribed cipro for 3 days  However, patient has been on cipro for 7-8 days in the past  Patient's daughter is asking for Dr Saud Luz opinion on length of cipro  Please advise

## 2021-05-20 DIAGNOSIS — N39.0 URINARY TRACT INFECTION WITHOUT HEMATURIA, SITE UNSPECIFIED: Primary | ICD-10-CM

## 2021-05-20 RX ORDER — CIPROFLOXACIN 250 MG/1
250 TABLET, FILM COATED ORAL EVERY 12 HOURS SCHEDULED
Qty: 8 TABLET | Refills: 0 | Status: SHIPPED | OUTPATIENT
Start: 2021-05-20 | End: 2021-05-24

## 2021-05-20 NOTE — PROGRESS NOTES
I was called regarding her treatment for uti  Her primary prescribed 3 days but daughter was concerned not long enough as frequent infections  Will add 4 more days cipro 250 mg bid  She is being sent to urology for frequent uti and we told them good idea

## 2021-07-30 ENCOUNTER — OFFICE VISIT (OUTPATIENT)
Dept: SURGICAL ONCOLOGY | Facility: CLINIC | Age: 86
End: 2021-07-30
Payer: COMMERCIAL

## 2021-07-30 VITALS
RESPIRATION RATE: 16 BRPM | BODY MASS INDEX: 32.98 KG/M2 | WEIGHT: 163.6 LBS | DIASTOLIC BLOOD PRESSURE: 68 MMHG | SYSTOLIC BLOOD PRESSURE: 130 MMHG | HEIGHT: 59 IN | TEMPERATURE: 97.9 F | HEART RATE: 56 BPM

## 2021-07-30 DIAGNOSIS — Z08 ENCOUNTER FOR FOLLOW-UP EXAMINATION AFTER COMPLETED TREATMENT FOR MALIGNANT NEOPLASM: Primary | ICD-10-CM

## 2021-07-30 DIAGNOSIS — Z85.3 HISTORY OF MALIGNANT NEOPLASM OF BREAST: Chronic | ICD-10-CM

## 2021-07-30 DIAGNOSIS — Z12.31 VISIT FOR SCREENING MAMMOGRAM: ICD-10-CM

## 2021-07-30 PROCEDURE — 1160F RVW MEDS BY RX/DR IN RCRD: CPT | Performed by: NURSE PRACTITIONER

## 2021-07-30 PROCEDURE — 1036F TOBACCO NON-USER: CPT | Performed by: NURSE PRACTITIONER

## 2021-07-30 PROCEDURE — 99213 OFFICE O/P EST LOW 20 MIN: CPT | Performed by: NURSE PRACTITIONER

## 2021-07-30 NOTE — PROGRESS NOTES
Surgical Oncology Follow Up       [address] [facility] [address]  [facility] [address]    Alona Siegel  1934  [id number]  [facility]  [phone]    Chief Complaint   Patient presents with    Follow-up       Assessment/Plan:  1  Encounter for follow-up examination after completed treatment for malignant neoplasm    2  History of malignant neoplasm of breast  - 1 year f/u visit    3  Visit for screening mammogram  - Mammo screening bilateral w 3d & cad; Future      Discussion/Summary: Patient is an 15-year-old female who presents today for a follow-up for her bilateral breast cancer diagnosed in 2015  Her left breast pathology revealed invasive carcinoma, %, [place] 65-70%, her 2 positive   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, %, [place] 90 95%, her 2-   She underwent bilateral lumpectomies and sentinel node biopsies   She took anastrozole for a short time but this was discontinued secondary to arthralgias  Noreen Garcia has been on observation  Noreen Garcia had a bilateral mammogram  and left breast ultrasound on  September 20, 2020 which was BI-RADS 2, category 3 density  She complains today of left shoulder / clavicle pain which  has been exacerbated since a fall in April  This was worked up by Ortho and x-rays revealed severe arthritis of the left shoulder  She also reports chronic left arm swelling  There are no worrisome findings on her exam today  On today's exam her left arm  measures 1 cm larger than her right arm  Discussed the possibility of lymphedema and the option of a referral to lymphedema therapy for evaluation  Patient prefers to continue observation but knows to contact me if there are changes or further concerns   Of note, patient's daughter expressed that Chely's left arm was chronically larger than the right, even before her breast cancer surgery  We discussed discontinuation of mammography secondary to her age and multiple co morbidities but patient wishes to continue with mammograms at this time  I will order a bilateral screening mammogram for September and we will plan to see the patient back again in 1 year or sooner should the need arise  She was instructed to call with any new concerns or symptoms prior to that time  All of her and her daughter's questions were answered today  History of Present Illness:     Oncology History    No history exists  Diagnosis and Staging: Left breast invasive carcinoma, grade 2 Stage I (T1b, N0, M0) %, PA 65%, HER-2/gemma 3+, margins negative by greater than 2 mmRight breast invasive carcinoma 7 mm in size Stage I (T1b, NX, M0) grade 1 ER/PA HER-2/gemma pending  Closest margin 0 4 mmHeterogeneously dense breast   Treatment History: October 2015: Left breast lumpectomy sentinel lymph node biopsy, right breast lumpectomyArimidex discontinued secondary to arthralgias      -Interval History:   Patient complains of left shoulder pain which has been more exacerbated since a fall in April  She had x-rays and has been evaluated by ortho surgery and her rheumatologist   Her x-rays revealed severe arthritis of her shoulder  She also states that her left arm is more swollen than her right but states that this is not new  She notices no changes on her breast exam     Review of Systems:  Review of Systems   Constitutional: Negative for activity change, appetite change, chills, fatigue, fever and unexpected weight change  Respiratory: Negative for cough and shortness of breath  Cardiovascular: Negative for chest pain  Gastrointestinal: Negative for abdominal pain, constipation, diarrhea, nausea and vomiting  Musculoskeletal: Positive for arthralgias and myalgias  Negative for back pain and gait problem  Skin: Negative for color change and rash     Neurological: Negative for dizziness and headaches  Hematological: Negative for adenopathy  Psychiatric/Behavioral: Negative for agitation and confusion  All other systems reviewed and are negative        Patient Active Problem List   Diagnosis    Hypothyroidism    H/O: CVA (cerebrovascular accident)    Essential hypertension    Chronic combined systolic and diastolic heart failure [facility]    Depression    History of aortic valve replacement    Hyponatremia    Chronic anemia    SDH (subdural hematoma) ([facility])    AZUL (obstructive sleep apnea)    Hyperlipidemia    Chronic respiratory failure with hypoxia ([facility])    Controlled type 2 diabetes mellitus with diabetic neuropathy, without long-term current use of insulin ([facility])    RA (rheumatoid arthritis) ([facility] )    SLE (systemic lupus erythematosus) ([facility] )    H/O spinal fusion    Renal artery stenosis ([facility])    History of malignant neoplasm of breast    Morbid obesity ([facility] )    Osteoporosis    Peripheral polyneuropathy    Vitamin D deficiency    Stage 4 chronic kidney disease ([facility])    Alkalosis    Wheezing    Encounter for follow-up examination after completed treatment for malignant neoplasm    Nephrosclerosis arteriolar, stage 1-4 or unspecified chronic kidney disease    Seasonal allergic rhinitis due to pollen    Toxic metabolic encephalopathy    Aspiration pneumonia due to regurgitated food ([facility] )    Macrocytic anemia    Chronic gout without tophus    HTN (hypertension), benign     Past Medical History:   Diagnosis Date    Arthritis     Breast cancer ([facility] ) 09/08/2015    BILATERAL    Cardiac disease     aortic valve transplant    CHF (congestive heart failure) ([facility])     Compression fracture of body of thoracic vertebra ([facility])     CVA (cerebral vascular accident) ([facility] )     Diabetes mellitus ([facility] )     Disease of thyroid gland     Fibromyalgia, primary     H/O cervical fracture 01/09/2019    Hyperlipidemia     Hypertension     Neuropathy     AZUL (obstructive sleep apnea) 10/19/2019    Pressure injury of skin     Renal disorder     kidney stent    Stroke [facility])      Past Surgical History:   Procedure Laterality Date    AORTIC VALVE SURGERY      BREAST LUMPECTOMY Right 09/08/2015    BREAST LUMPECTOMY Left 09/08/2015    BREAST SURGERY      lumpectomy for breast cancer    CATARACT EXTRACTION      CHOLECYSTECTOMY      FACIAL/NECK BIOPSY Right 9/24/2020    Procedure: EXCISION CHEEK LESION X2 WITH FROZEN SECTION;  Surgeon: Amilcar Gasca DO;  Location:  MAIN OR;  Service: Plastics    HYSTERECTOMY  1978    IR THORACENTESIS  2/28/2020    JOINT REPLACEMENT      KIDNEY SURGERY      stent placed for kidney    OTHER SURGICAL HISTORY      abdominal aneurysm surgery    OK ARTHRODESIS POSTERIOR ATLAS-AXIS C1-C2 N/A 5/1/2019    Procedure: C1-C2 lateral mass fixation fusion with possible sublaminar cables;  Surgeon: Fiorella Brand MD;  Location:  MAIN OR;  Service: Neurosurgery    REPLACEMENT TOTAL KNEE      left      Family History   Problem Relation Age of Onset    Heart disease Mother     Arthritis Mother     Diabetes Mother     Heart failure Father     Arthritis Father     Other Father         enlargement of prostate     Dementia Father     No Known Problems Daughter     No Known Problems Maternal Grandmother     No Known Problems Maternal Grandfather     No Known Problems Paternal Grandmother     No Known Problems Paternal Grandfather     No Known Problems Maternal Aunt     No Known Problems Maternal Aunt     No Known Problems Maternal Aunt     No Known Problems Maternal Aunt     No Known Problems Paternal Aunt     No Known Problems Paternal Aunt     Prostate cancer Son     Prostate cancer Son      Social History     Socioeconomic History    Marital status:       Spouse name: Not on file    Number of children: 3    Years of education: Not on file    Highest education level: Not on file   Occupational History    Not on file   Tobacco Use    Smoking status: Never Smoker    Smokeless tobacco: Never Used   Vaping Use    Vaping Use: Never used   Substance and Sexual Activity    Alcohol use: Yes    Drug use: Never    Sexual activity: Not Currently   Other Topics Concern    Not on file   Social History Narrative    ** Merged History Encounter **          Social Determinants of Health     Financial Resource Strain:     Difficulty of Paying Living Expenses:    Food Insecurity:     Worried About Running Out of Food in the Last Year:     Ran Out of Food in the Last Year:    Transportation Needs:     Lack of Transportation (Medical):      Lack of Transportation (Non-Medical):    Physical Activity:     Days of Exercise per Week:     Minutes of Exercise per Session:    Stress:     Feeling of Stress :    Social Connections:     Frequency of Communication with Friends and Family:     Frequency of Social Gatherings with Friends and Family:     Attends Anabaptism Services:     Active Member of Clubs or Organizations:     Attends Club or Organization Meetings:     Marital Status:    Intimate Partner Violence:     Fear of Current or Ex-Partner:     Emotionally Abused:     Physically Abused:     Sexually Abused:        Current Outpatient Medications:     albuterol (2 5 mg/3 mL) 0 083 % nebulizer solution, Take 1 vial (2 5 mg total) by nebulization 3 (three) times a day, Disp: 25 vial, Rfl: 3    albuterol (PROVENTIL HFA,VENTOLIN HFA) 90 mcg/act inhaler, Inhale 2 puffs every 4 (four) hours as needed for wheezing, Disp: , Rfl:     ALLOPURINOL PO, Take by mouth 2 (two) times a day , Disp: , Rfl:     calcium carbonate (OS-EMILY) 1250 (500 Ca) MG tablet, Take 1 tablet by mouth daily, Disp: , Rfl:     clopidogrel (PLAVIX) 75 mg tablet, TAKE 1 TABLET DAILY, Disp: 90 tablet, Rfl: 3    colchicine (COLCRYS) 0 6 mg tablet, Take 0 6 mg by mouth every other day, Disp: , Rfl:     docusate sodium (COLACE) 100 mg capsule, Take 100 mg by mouth 2 (two) times a day as needed for constipation, Disp: , Rfl:     fenofibrate (TRICOR) 48 mg tablet, Take 1 tablet (48 mg total) by mouth daily, Disp: 30 tablet, Rfl: 4    gabapentin (NEURONTIN) 300 mg capsule, Take 1 capsule (300 mg total) by mouth 3 (three) times a day, Disp: 60 capsule, Rfl: 0    HYDROcodone-acetaminophen (NORCO) 5-325 mg per tablet, Take 1 tablet by mouth every 8 (eight) hours as needed for pain, Disp: , Rfl:     isosorbide mononitrate (IMDUR) 30 mg 24 hr tablet, TAKE 1 TABLET DAILY, Disp: 90 tablet, Rfl: 3    levothyroxine 112 mcg tablet, Take 112 mcg by mouth daily , Disp: , Rfl:     lidocaine (LIDODERM) 5 %, Apply 1 patch topically daily Remove & Discard patch within 12 hours or as directed by MD, Disp: , Rfl:     methocarbamol (ROBAXIN) 500 mg tablet, Take 500 mg by mouth 3 (three) times a day as needed for muscle spasms, Disp: , Rfl:     metolazone (ZAROXOLYN) 5 mg tablet, Take 1 tablet (5 mg total) by mouth as needed (if gains 2lbs in 2 days ), Disp: 15 tablet, Rfl: 0    metoprolol tartrate (LOPRESSOR) 25 mg tablet, Take 1 tablet (25 mg total) by mouth 2 (two) times a day, Disp: 180 tablet, Rfl: 3    Multiple Vitamin (multivitamin) capsule, Take 1 capsule by mouth daily, Disp: , Rfl:     Multiple Vitamins-Calcium (MULTI-DAY/CALCIUM/EXTRA IRON PO), Take 1 tablet by mouth daily, Disp: , Rfl:     nitroglycerin (NITROSTAT) 0 4 mg SL tablet, , Disp: , Rfl:     Red Yeast Rice Extract (RED YEAST RICE PO), Take 600 mg by mouth daily, Disp: , Rfl:     sertraline (ZOLOFT) 25 mg tablet, 25 mg daily at bedtime , Disp: , Rfl:     torsemide (DEMADEX) 20 mg tablet, 40mg in am and 20 mg in afternoon, Disp: 270 tablet, Rfl: 3  Allergies   Allergen Reactions    Duloxetine Hcl Other (See Comments) and Hypertension    Duloxetine Hcl      Cymbalta;  Hemorrhagic stroke listed as reaction    Escitalopram      Other reaction(s): Urinary Retention    Pregabalin      Other reaction(s): Hypertension    Statins Myalgia  Tramadol      Other reaction(s): Hypertension    Triprolidine-Pse Other (See Comments)    Anastrozole Abdominal Pain    Anastrozole     Antihistamines, Diphenhydramine-Type     Exemestane      Aromasin; Muscle pain & cramps    Lexapro [Escitalopram]      Urinary retention    Lyrica [Pregabalin]      hypertension    Metformin      diarrhea    Oxycodone      Pt unsure      Statins      Muscle pain & cramps    Tramadol      hypertension    Triprolidine-Pseudoephedrine     Metformin Diarrhea     Vitals:    07/30/21 1111   BP: 130/68   Pulse: 56   Resp: 16   Temp: 97 9 °F (36 6 °C)       Physical Exam  Vitals reviewed  Constitutional:       General: She is not in acute distress  Appearance: Normal appearance  She is well-developed  She is not diaphoretic  HENT:      Head: Normocephalic and atraumatic  Cardiovascular:      Rate and Rhythm: Normal rate and regular rhythm  Heart sounds: Normal heart sounds  Pulmonary:      Effort: Pulmonary effort is normal       Breath sounds: Normal breath sounds  Chest:      Breasts:         Right: Skin change (surgical scar) present  No swelling, bleeding, inverted nipple, mass, nipple discharge or tenderness  Left: Skin change (surgical scar) present  No swelling, bleeding, inverted nipple, mass, nipple discharge or tenderness  Comments: Patient examined in the sitting position only- in wheelchair    Bilateral upper arm measurements taken 15 cm above elbow  Right arm measures 43 cm, left arm measures 44 cm  Abdominal:      Palpations: Abdomen is soft  There is no mass  Tenderness: There is no abdominal tenderness  Musculoskeletal:         General: Normal range of motion  Cervical back: Normal range of motion  Lymphadenopathy:      Upper Body:      Right upper body: No supraclavicular or axillary adenopathy  Left upper body: No supraclavicular or axillary adenopathy  Skin:     General: Skin is warm and dry  Findings: No rash  Neurological:      Mental Status: She is alert and oriented to person, place, and time  Psychiatric:         Speech: Speech normal            Advance Care Planning/Advance Directives:  Discussed disease status, cancer treatment plans and/or cancer treatment goals with the patient

## 2021-08-06 ENCOUNTER — HOSPITAL ENCOUNTER (EMERGENCY)
Facility: HOSPITAL | Age: 86
Discharge: HOME | End: 2021-08-06
Attending: EMERGENCY MEDICINE | Admitting: EMERGENCY MEDICINE
Payer: COMMERCIAL

## 2021-08-06 VITALS
WEIGHT: 164 LBS | HEART RATE: 58 BPM | OXYGEN SATURATION: 95 % | RESPIRATION RATE: 18 BRPM | BODY MASS INDEX: 33.12 KG/M2 | TEMPERATURE: 98.4 F | SYSTOLIC BLOOD PRESSURE: 140 MMHG | DIASTOLIC BLOOD PRESSURE: 65 MMHG

## 2021-08-06 DIAGNOSIS — N39.0 UTI (URINARY TRACT INFECTION): Primary | ICD-10-CM

## 2021-08-06 LAB
ANION GAP SERPL CALCULATED.3IONS-SCNC: 8 MMOL/L (ref 4–13)
BACTERIA UR QL AUTO: ABNORMAL /HPF
BASOPHILS # BLD AUTO: 0.06 THOUSANDS/ΜL (ref 0–0.1)
BASOPHILS NFR BLD AUTO: 1 % (ref 0–1)
BILIRUB UR QL STRIP: NEGATIVE
BUN SERPL-MCNC: 67 MG/DL (ref 5–25)
CALCIUM SERPL-MCNC: 9.8 MG/DL (ref 8.3–10.1)
CHLORIDE SERPL-SCNC: 99 MMOL/L (ref 100–108)
CLARITY UR: ABNORMAL
CO2 SERPL-SCNC: 31 MMOL/L (ref 21–32)
COLOR UR: YELLOW
CREAT SERPL-MCNC: 2.06 MG/DL (ref 0.6–1.3)
EOSINOPHIL # BLD AUTO: 0.35 THOUSAND/ΜL (ref 0–0.61)
EOSINOPHIL NFR BLD AUTO: 6 % (ref 0–6)
ERYTHROCYTE [DISTWIDTH] IN BLOOD BY AUTOMATED COUNT: 14.6 % (ref 11.6–15.1)
GFR SERPL CREATININE-BSD FRML MDRD: 21 ML/MIN/1.73SQ M
GLUCOSE SERPL-MCNC: 137 MG/DL (ref 65–140)
GLUCOSE UR STRIP-MCNC: NEGATIVE MG/DL
HCT VFR BLD AUTO: 34.4 % (ref 34.8–46.1)
HGB BLD-MCNC: 11.1 G/DL (ref 11.5–15.4)
HGB UR QL STRIP.AUTO: ABNORMAL
HYALINE CASTS #/AREA URNS LPF: ABNORMAL /LPF
IMM GRANULOCYTES # BLD AUTO: 0.02 THOUSAND/UL (ref 0–0.2)
IMM GRANULOCYTES NFR BLD AUTO: 0 % (ref 0–2)
KETONES UR STRIP-MCNC: NEGATIVE MG/DL
LEUKOCYTE ESTERASE UR QL STRIP: ABNORMAL
LYMPHOCYTES # BLD AUTO: 1.61 THOUSANDS/ΜL (ref 0.6–4.47)
LYMPHOCYTES NFR BLD AUTO: 25 % (ref 14–44)
MCH RBC QN AUTO: 34.4 PG (ref 26.8–34.3)
MCHC RBC AUTO-ENTMCNC: 32.3 G/DL (ref 31.4–37.4)
MCV RBC AUTO: 107 FL (ref 82–98)
MONOCYTES # BLD AUTO: 0.36 THOUSAND/ΜL (ref 0.17–1.22)
MONOCYTES NFR BLD AUTO: 6 % (ref 4–12)
NEUTROPHILS # BLD AUTO: 3.98 THOUSANDS/ΜL (ref 1.85–7.62)
NEUTS SEG NFR BLD AUTO: 62 % (ref 43–75)
NITRITE UR QL STRIP: POSITIVE
NON-SQ EPI CELLS URNS QL MICRO: ABNORMAL /HPF
NRBC BLD AUTO-RTO: 0 /100 WBCS
PH UR STRIP.AUTO: 6 [PH]
PLATELET # BLD AUTO: 235 THOUSANDS/UL (ref 149–390)
PMV BLD AUTO: 9.7 FL (ref 8.9–12.7)
POTASSIUM SERPL-SCNC: 3.4 MMOL/L (ref 3.5–5.3)
PROT UR STRIP-MCNC: NEGATIVE MG/DL
RBC # BLD AUTO: 3.23 MILLION/UL (ref 3.81–5.12)
RBC #/AREA URNS AUTO: ABNORMAL /HPF
SODIUM SERPL-SCNC: 138 MMOL/L (ref 136–145)
SP GR UR STRIP.AUTO: 1.01 (ref 1–1.03)
UROBILINOGEN UR QL STRIP.AUTO: 0.2 E.U./DL
WBC # BLD AUTO: 6.38 THOUSAND/UL (ref 4.31–10.16)
WBC #/AREA URNS AUTO: ABNORMAL /HPF

## 2021-08-06 PROCEDURE — 87086 URINE CULTURE/COLONY COUNT: CPT | Performed by: EMERGENCY MEDICINE

## 2021-08-06 PROCEDURE — 87186 SC STD MICRODIL/AGAR DIL: CPT | Performed by: EMERGENCY MEDICINE

## 2021-08-06 PROCEDURE — 80048 BASIC METABOLIC PNL TOTAL CA: CPT | Performed by: EMERGENCY MEDICINE

## 2021-08-06 PROCEDURE — 85025 COMPLETE CBC W/AUTO DIFF WBC: CPT | Performed by: EMERGENCY MEDICINE

## 2021-08-06 PROCEDURE — 87077 CULTURE AEROBIC IDENTIFY: CPT | Performed by: EMERGENCY MEDICINE

## 2021-08-06 PROCEDURE — 99284 EMERGENCY DEPT VISIT MOD MDM: CPT | Performed by: EMERGENCY MEDICINE

## 2021-08-06 PROCEDURE — 36415 COLL VENOUS BLD VENIPUNCTURE: CPT | Performed by: EMERGENCY MEDICINE

## 2021-08-06 PROCEDURE — 99283 EMERGENCY DEPT VISIT LOW MDM: CPT

## 2021-08-06 PROCEDURE — 81001 URINALYSIS AUTO W/SCOPE: CPT | Performed by: EMERGENCY MEDICINE

## 2021-08-06 RX ORDER — CEFPODOXIME PROXETIL 100 MG/1
100 TABLET, FILM COATED ORAL 2 TIMES DAILY
Qty: 14 TABLET | Refills: 0 | Status: SHIPPED | OUTPATIENT
Start: 2021-08-06 | End: 2021-08-13

## 2021-08-06 RX ORDER — CEFPODOXIME PROXETIL 200 MG/1
200 TABLET, FILM COATED ORAL ONCE
Status: COMPLETED | OUTPATIENT
Start: 2021-08-06 | End: 2021-08-06

## 2021-08-06 RX ADMIN — CEFPODOXIME PROXETIL 200 MG: 200 TABLET, FILM COATED ORAL at 15:33

## 2021-08-06 NOTE — ED PROVIDER NOTES
History  Chief Complaint   Patient presents with    Possible UTI     Pt and daughter report pt having multiple UTI, PO cipro no longer working, sent in by PCP for IV Abx due to pt kidney function unabkle to tolerate PO ABX     Patient is an 59-year-old female with a past medical history of diabetes, hypertension, CHF, and multiple previous UTIs who presents for evaluation of a UTI  Patient states that she had a urinary tract infection diagnosed on an outpatient urinalysis  Patient was placed on Cipro for treatment as this is what has worked for her in past   Per patient and her daughter, patient has had some altered mental status, along with dysuria and cloudy urine  Patient's symptoms have not seemed to improve on the Cipro  Patient was sent in by her PCP for further medical management with concern that patient may need IV antibiotics for treatment  According to patient's daughter, this has been her 3rd urinary tract infection within the past few weeks  On chart review, it appears the patient does have an E coli UTI  Bacteria is resistant to multiple antimicrobial medications  Patient currently denies any fevers, chills, chest pain, shortness of breath, abdominal pain, nausea or vomiting  History provided by:  Patient and relative   used: No        Prior to Admission Medications   Prescriptions Last Dose Informant Patient Reported? Taking?    ALLOPURINOL PO  Self Yes No   Sig: Take by mouth 2 (two) times a day    HYDROcodone-acetaminophen (NORCO) 5-325 mg per tablet  Self Yes No   Sig: Take 1 tablet by mouth every 8 (eight) hours as needed for pain   Multiple Vitamin (multivitamin) capsule  Self Yes No   Sig: Take 1 capsule by mouth daily   Multiple Vitamins-Calcium (MULTI-DAY/CALCIUM/EXTRA IRON PO)  Self Yes No   Sig: Take 1 tablet by mouth daily   Red Yeast Rice Extract (RED YEAST RICE PO)  Self Yes No   Sig: Take 600 mg by mouth daily   albuterol (2 5 mg/3 mL) 0 083 % nebulizer solution  Self No No   Sig: Take 1 vial (2 5 mg total) by nebulization 3 (three) times a day   albuterol (PROVENTIL HFA,VENTOLIN HFA) 90 mcg/act inhaler  Self Yes No   Sig: Inhale 2 puffs every 4 (four) hours as needed for wheezing   calcium carbonate (OS-EMILY) 1250 (500 Ca) MG tablet  Self Yes No   Sig: Take 1 tablet by mouth daily   clopidogrel (PLAVIX) 75 mg tablet  Self No No   Sig: TAKE 1 TABLET DAILY   colchicine (COLCRYS) 0 6 mg tablet  Self Yes No   Sig: Take 0 6 mg by mouth every other day   docusate sodium (COLACE) 100 mg capsule  Self Yes No   Sig: Take 100 mg by mouth 2 (two) times a day as needed for constipation   fenofibrate (TRICOR) 48 mg tablet  Self No No   Sig: Take 1 tablet (48 mg total) by mouth daily   gabapentin (NEURONTIN) 300 mg capsule  Self No No   Sig: Take 1 capsule (300 mg total) by mouth 3 (three) times a day   isosorbide mononitrate (IMDUR) 30 mg 24 hr tablet  Self No No   Sig: TAKE 1 TABLET DAILY   levothyroxine 112 mcg tablet  Self Yes No   Sig: Take 112 mcg by mouth daily    lidocaine (LIDODERM) 5 %  Self Yes No   Sig: Apply 1 patch topically daily Remove & Discard patch within 12 hours or as directed by MD   methocarbamol (ROBAXIN) 500 mg tablet  Self Yes No   Sig: Take 500 mg by mouth 3 (three) times a day as needed for muscle spasms   metolazone (ZAROXOLYN) 5 mg tablet  Self No No   Sig: Take 1 tablet (5 mg total) by mouth as needed (if gains 2lbs in 2 days )   metoprolol tartrate (LOPRESSOR) 25 mg tablet  Self No No   Sig: Take 1 tablet (25 mg total) by mouth 2 (two) times a day   nitroglycerin (NITROSTAT) 0 4 mg SL tablet  Self Yes No   sertraline (ZOLOFT) 25 mg tablet  Self Yes No   Si mg daily at bedtime    torsemide (DEMADEX) 20 mg tablet  Self No No   Simg in am and 20 mg in afternoon      Facility-Administered Medications: None       Past Medical History:   Diagnosis Date    Arthritis     Breast cancer ([facility] [address] ) 2015    BILATERAL    Cardiac disease aortic valve transplant    CHF (congestive heart failure) (HCC)     Compression fracture of body of thoracic vertebra (HCC)     CVA [facility]cerebral vascular accident) ([facility] [address] )     Diabetes mellitus ([facility] [address] )     Disease of thyroid gland     Fibromyalgia, primary     H/O cervical fracture 01/09/2019    Hyperlipidemia     Hypertension     Neuropathy     AZUL (obstructive sleep apnea) 10/19/2019    Pressure injury of skin     Renal disorder     kidney stent    Stroke [facility])        Past Surgical History:   Procedure Laterality Date    AORTIC VALVE SURGERY      BREAST LUMPECTOMY Right 09/08/2015    BREAST LUMPECTOMY Left 09/08/2015    BREAST SURGERY      lumpectomy for breast cancer    CATARACT EXTRACTION      CHOLECYSTECTOMY      FACIAL/NECK BIOPSY Right 9/24/2020    Procedure: EXCISION CHEEK LESION X2 WITH FROZEN SECTION;  Surgeon: Darlene Herring DO;  Location:  MAIN OR;  Service: Plastics    HYSTERECTOMY  1978    IR THORACENTESIS  2/28/2020    JOINT REPLACEMENT      KIDNEY SURGERY      stent placed for kidney    OTHER SURGICAL HISTORY      abdominal aneurysm surgery    ID ARTHRODESIS POSTERIOR ATLAS-AXIS C1-C2 N/A 5/1/2019    Procedure: C1-C2 lateral mass fixation fusion with possible sublaminar cables;  Surgeon: Janey Alvarez MD;  Location:  MAIN OR;  Service: Neurosurgery    REPLACEMENT TOTAL KNEE      left        Family History   Problem Relation Age of Onset    Heart disease Mother     Arthritis Mother     Diabetes Mother     Heart failure Father     Arthritis Father     Other Father         enlargement of prostate     Dementia Father     No Known Problems Daughter     No Known Problems Maternal Grandmother     No Known Problems Maternal Grandfather     No Known Problems Paternal Grandmother     No Known Problems Paternal Grandfather     No Known Problems Maternal Aunt     No Known Problems Maternal Aunt     No Known Problems Maternal Aunt     No Known Problems Maternal Aunt  No Known Problems Paternal Aunt     No Known Problems Paternal Aunt     Prostate cancer Son     Prostate cancer Son      I have reviewed and agree with the history as documented  E-Cigarette/Vaping    E-Cigarette Use Never User      E-Cigarette/Vaping Substances    Nicotine No     THC No     CBD No     Flavoring No     Other No     Unknown No      Social History     Tobacco Use    Smoking status: Never Smoker    Smokeless tobacco: Never Used   Vaping Use    Vaping Use: Never used   Substance Use Topics    Alcohol use: Yes    Drug use: Never        Review of Systems   Constitutional: Negative for chills and fever  HENT: Negative  Respiratory: Negative for shortness of breath  Cardiovascular: Negative for chest pain  Gastrointestinal: Negative for abdominal pain, nausea and vomiting  Genitourinary: Positive for dysuria  Negative for hematuria  Musculoskeletal: Negative  Skin: Negative  Psychiatric/Behavioral: Positive for confusion  All other systems reviewed and are negative  Physical Exam  ED Triage Vitals [08/06/21 1340]   Temperature Pulse Respirations Blood Pressure SpO2   98 4 °F (36 9 °C) 61 18 161/70 97 %      Temp Source Heart Rate Source Patient Position - Orthostatic VS BP Location FiO2 (%)   Oral Monitor Lying Right arm --      Pain Score       --             Orthostatic Vital Signs  Vitals:    08/06/21 1340 08/06/21 1535   BP: 161/70 140/65   Pulse: 61 58   Patient Position - Orthostatic VS: Lying Lying       Physical Exam  Vitals and nursing note reviewed  Constitutional:       General: She is awake  She is not in acute distress  Appearance: She is not toxic-appearing or diaphoretic  HENT:      Head: Normocephalic and atraumatic  Mouth/Throat:      Lips: Pink  Mouth: Mucous membranes are moist    Eyes:      General: Vision grossly intact  Gaze aligned appropriately  Cardiovascular:      Rate and Rhythm: Normal rate and regular rhythm  Heart sounds: S1 normal and S2 normal    Pulmonary:      Effort: Pulmonary effort is normal  No respiratory distress  Breath sounds: Normal breath sounds  Abdominal:      Palpations: Abdomen is soft  Tenderness: There is no abdominal tenderness  Skin:     General: Skin is warm and dry  Neurological:      General: No focal deficit present  Mental Status: She is alert and oriented to person, place, and time           ED Medications  Medications   cefpodoxime (VANTIN) tablet 200 mg (200 mg Oral Given 8/6/21 1533)       Diagnostic Studies  Results Reviewed     Procedure Component Value Units Date/Time    Basic metabolic panel [[id number]]  (Abnormal) Collected: 08/06/21 1450    Lab Status: Final result Specimen: Blood from Arm, Right Updated: 08/06/21 1530     Sodium 138 mmol/L      Potassium 3 4 mmol/L      Chloride 99 mmol/L      CO2 31 mmol/L      ANION GAP 8 mmol/L      BUN 67 mg/dL      Creatinine 2 06 mg/dL      Glucose 137 mg/dL      Calcium 9 8 mg/dL      eGFR 21 ml/min/1 73sq m     Narrative:      Lisa guidelines for Chronic Kidney Disease (CKD):     Stage 1 with normal or high GFR (GFR > 90 mL/min/1 73 square meters)    Stage 2 Mild CKD (GFR = 60-89 mL/min/1 73 square meters)    Stage 3A Moderate CKD (GFR = 45-59 mL/min/1 73 square meters)    Stage 3B Moderate CKD (GFR = 30-44 mL/min/1 73 square meters)    Stage 4 Severe CKD (GFR = 15-29 mL/min/1 73 square meters)    Stage 5 End Stage CKD (GFR <15 mL/min/1 73 square meters)  Note: GFR calculation is accurate only with a steady state creatinine    CBC and differential [[id number]]  (Abnormal) Collected: 08/06/21 1450    Lab Status: Final result Specimen: Blood from Arm, Right Updated: 08/06/21 1519     WBC 6 38 Thousand/uL      RBC 3 23 Million/uL      Hemoglobin 11 1 g/dL      Hematocrit 34 4 %       fL      MCH 34 4 pg      MCHC 32 3 g/dL      RDW 14 6 %      MPV 9 7 fL      Platelets 453 Thousands/uL      nRBC 0 /100 WBCs      Neutrophils Relative 62 %      Immat GRANS % 0 %      Lymphocytes Relative 25 %      Monocytes Relative 6 %      Eosinophils Relative 6 %      Basophils Relative 1 %      Neutrophils Absolute 3 98 Thousands/µL      Immature Grans Absolute 0 02 Thousand/uL      Lymphocytes Absolute 1 61 Thousands/µL      Monocytes Absolute 0 36 Thousand/µL      Eosinophils Absolute 0 35 Thousand/µL      Basophils Absolute 0 06 Thousands/µL     Urine Microscopic [[id number]]  (Abnormal) Collected: 08/06/21 1416    Lab Status: Final result Specimen: Urine, Clean Catch Updated: 08/06/21 1454     RBC, UA 2-4 /hpf      WBC, UA Innumerable /hpf      Epithelial Cells None Seen /hpf      Bacteria, UA Occasional /hpf      Hyaline Casts, UA None Seen /lpf     Urine culture [[id number]] Collected: 08/06/21 1416    Lab Status: In process Specimen: Urine, Clean Catch Updated: 08/06/21 1454    UA w Reflex to Microscopic w Reflex to Culture [[id number]]  (Abnormal) Collected: 08/06/21 1416    Lab Status: Final result Specimen: Urine, Clean Catch Updated: 08/06/21 1452     Color, UA Yellow     Clarity, UA Turbid     Specific [place], UA 1 013     pH, UA 6 0     Leukocytes, UA Large     Nitrite, UA Positive     Protein, UA Negative mg/dl      Glucose, UA Negative mg/dl      Ketones, UA Negative mg/dl      Urobilinogen, UA 0 2 E U /dl      Bilirubin, UA Negative     Blood, UA Small                 No orders to display         Procedures  Procedures      ED Course  ED Course as of Aug 07 0805   Fri Aug 06, 2021   1455 WBC, UA(!): Innumerable   1455 Leukocytes, UA(!): Large   1455 Nitrite, UA(!): Positive   1516 Spoke with ID, will try cefpodoxime  Will give single dose and monitor for adverse reaction then plan for discharge home                                            MDM  Number of Diagnoses or Management Options  UTI (urinary tract infection): established and worsening  Diagnosis management comments: 66-year-old female presents with a known urinary tract infection  Patient had been on Cipro as an outpatient, symptoms are not improving  Patient sent in by her PCP for further management with concerns that patient may need IV antibiotics  Patient complaining of dysuria and "cloudy urine", daughter states that patient has also been slightly altered over the past few days  On chart review, appears the patient has E coli UTI that is resistant to multiple antibiotics  Patient's vitals currently stable, no abdominal tenderness on exam   Will evaluate patient with repeat urinalysis and basic labs  Given patient's culture sensitivities, along with her multiple allergies and chronic kidney disease, spoke with Infectious Disease specialist regarding ideal antibiotic regimen  Infectious disease suggested placing patient on cefpodoxime  Patient given a single dose here in the ED and monitored for allergic reaction  Patient tolerated the medication without difficulty  Patient's lab work revealed a mild elevation in Cr from previous, likely secondary to infection  Patient appears euvolemic on exam  Patient told to call her family doctor for follow up with outpatient lab testing  Patient discharged to home in stable condition with prescription for cefpodoxime  Patient given strict ED return precautions          Amount and/or Complexity of Data Reviewed  Clinical lab tests: ordered and reviewed  Review and summarize past medical records: yes  Discuss the patient with other providers: yes    Patient Progress  Patient progress: stable      Disposition  Final diagnoses:   UTI (urinary tract infection)     Time reflects when diagnosis was documented in both MDM as applicable and the Disposition within this note     Time User Action Codes Description Comment    8/6/2021  2:47 PM Acosta Dawson Add [N39 0] UTI (urinary tract infection)       ED Disposition     ED Disposition Condition Date/Time Comment    Discharge Stable Fri Aug 6, 2021  4:08 PM Steven Cain discharge to home/self care              Follow-up Information     Follow up With Specialties Details Why Contact Info Additional Information    Curry Congress, DO Family Medicine Schedule an appointment as soon as possible for a visit in 1 week  [facility] [place] [address] [address]       [address] [facility] Emergency Department Emergency Medicine Go to  If symptoms worsen [address]  [address] [facility] [address] [facility] Emergency Department, [address] [facility], [place], [place], [address]          Discharge Medication List as of 8/6/2021  4:13 PM      START taking these medications    Details   cefpodoxime (VANTIN) 100 mg tablet Take 1 tablet (100 mg total) by mouth 2 (two) times a day for 7 days, Starting Fri 8/6/2021, Until Fri 8/13/2021, Normal         CONTINUE these medications which have NOT CHANGED    Details   albuterol (2 5 mg/3 mL) 0 083 % nebulizer solution Take 1 vial (2 5 mg total) by nebulization 3 (three) times a day, Starting u 2/27/2020, Normal      albuterol (PROVENTIL HFA,VENTOLIN HFA) 90 mcg/act inhaler Inhale 2 puffs every 4 (four) hours as needed for wheezing, Historical Med      ALLOPURINOL PO Take by mouth 2 (two) times a day , Historical Med      calcium carbonate (OS-EMILY) 1250 (500 Ca) MG tablet Take 1 tablet by mouth daily, Historical Med      clopidogrel (PLAVIX) 75 mg tablet TAKE 1 TABLET DAILY, Normal      colchicine (COLCRYS) 0 6 mg tablet Take 0 6 mg by mouth every other day, Historical Med      docusate sodium (COLACE) 100 mg capsule Take 100 mg by mouth 2 (two) times a day as needed for constipation, Historical Med      fenofibrate (TRICOR) 48 mg tablet Take 1 tablet (48 mg total) by mouth daily, Starting Tue 5/4/2021, Normal      gabapentin (NEURONTIN) 300 mg capsule Take 1 capsule (300 mg total) by mouth 3 (three) times a day, Starting Tue 11/5/2019, No Print HYDROcodone-acetaminophen (NORCO) 5-325 mg per tablet Take 1 tablet by mouth every 8 (eight) hours as needed for pain, Historical Med      isosorbide mononitrate (IMDUR) 30 mg 24 hr tablet TAKE 1 TABLET DAILY, Normal      levothyroxine 112 mcg tablet Take 112 mcg by mouth daily , Starting Mon 9/30/2019, Historical Med      lidocaine (LIDODERM) 5 % Apply 1 patch topically daily Remove & Discard patch within 12 hours or as directed by MD, Historical Med      methocarbamol (ROBAXIN) 500 mg tablet Take 500 mg by mouth 3 (three) times a day as needed for muscle spasms, Historical Med      metolazone (ZAROXOLYN) 5 mg tablet Take 1 tablet (5 mg total) by mouth as needed (if gains 2lbs in 2 days ), Starting Wed 11/6/2019, Normal      metoprolol tartrate (LOPRESSOR) 25 mg tablet Take 1 tablet (25 mg total) by mouth 2 (two) times a day, Starting Tue 10/13/2020, Normal      Multiple Vitamin (multivitamin) capsule Take 1 capsule by mouth daily, Historical Med      Multiple Vitamins-Calcium (MULTI-DAY/CALCIUM/EXTRA IRON PO) Take 1 tablet by mouth daily, Historical Med      nitroglycerin (NITROSTAT) 0 4 mg SL tablet Starting Wed 1/29/2020, Historical Med      Red Yeast Rice Extract (RED YEAST RICE PO) Take 600 mg by mouth daily, Historical Med      sertraline (ZOLOFT) 25 mg tablet 25 mg daily at bedtime , Starting Sat 5/12/2018, Historical Med      torsemide (DEMADEX) 20 mg tablet 40mg in am and 20 mg in afternoon, Normal           No discharge procedures on file  PDMP Review       Value Time User    PDMP Reviewed  Yes 9/29/2020 11:33 PM Mei Varela DO           ED Provider  Attending physically available and evaluated Dotty Matos  KRISTOFER managed the patient along with the ED Attending      Electronically Signed by         Deanne Dorsey DO  08/07/21 7021

## 2021-08-06 NOTE — ED ATTENDING ATTESTATION
Otilia Marie, DO, saw and evaluated the patient  I have discussed the patient with the resident/non-physician practitioner and agree with the resident's/non-physician practitioner's findings, Plan of Care, and MDM as documented in the resident's/non-physician practitioner's note, except where noted  All available labs and Radiology studies were reviewed  At this point I agree with the current assessment done in the Emergency Department  I have conducted an independent evaluation of this patient including a focused history and a physical exam     ED Note - Miladis Alcantar 80 y o  female MRN: [id number]  Unit/Bed#: ED 06 Encounter: [id number]    History of Present Illness   HPI  Miladis Alcantar is a 80 y o  female who presents for evaluation persistent urinary symptoms  Has been on ciprofloxacin  Known to have resistant E coli  Otherwise asymptomatic  REVIEW OF SYSTEMS  See HPI for further details  12 systems reviewed and otherwise negative except as noted     Historical Information     PAST MEDICAL HISTORY  Past Medical History:   Diagnosis Date    Arthritis     Breast cancer ([facility] ) 09/08/2015    BILATERAL    Cardiac disease     aortic valve transplant    [facility]     Compression fracture of body of thoracic vertebra ([facility])     CVA (cerebral vascular accident) ([facility] )     Diabetes mellitus ([place] [address] )     Disease of thyroid gland     Fibromyalgia, primary     H/O cervical fracture 01/09/2019    Hyperlipidemia     Hypertension     Neuropathy     AZUL (obstructive sleep apnea) 10/19/2019    Pressure injury of skin     Renal disorder     kidney stent    Stroke [facility])        FAMILY HISTORY  Family History   Problem Relation Age of Onset    Heart disease Mother     Arthritis Mother     Diabetes Mother     Heart failure Father     Arthritis Father     Other Father         enlargement of prostate     Dementia Father     No Known Problems Daughter     No Known Problems Maternal Grandmother     No Known Problems Maternal Grandfather     No Known Problems Paternal Grandmother     No Known Problems Paternal Grandfather     No Known Problems Maternal Aunt     No Known Problems Maternal Aunt     No Known Problems Maternal Aunt     No Known Problems Maternal Aunt     No Known Problems Paternal Aunt     No Known Problems Paternal Aunt     Prostate cancer Son     Prostate cancer Son        SOCIAL HISTORY  Social History     Socioeconomic History    Marital status:      Spouse name: None    Number of children: 3    Years of education: None    Highest education level: None   Occupational History    None   Tobacco Use    Smoking status: Never Smoker    Smokeless tobacco: Never Used   Vaping Use    Vaping Use: Never used   Substance and Sexual Activity    Alcohol use: Yes    Drug use: Never    Sexual activity: Not Currently   Other Topics Concern    None   Social History Narrative    ** Merged History Encounter **          Social Determinants of Health     Financial Resource Strain:     Difficulty of Paying Living Expenses:    Food Insecurity:     Worried About Running Out of Food in the Last Year:     Ran Out of Food in the Last Year:    Transportation Needs:     Lack of Transportation (Medical):      Lack of Transportation (Non-Medical):    Physical Activity:     Days of Exercise per Week:     Minutes of Exercise per Session:    Stress:     Feeling of Stress :    Social Connections:     Frequency of Communication with Friends and Family:     Frequency of Social Gatherings with Friends and Family:     Attends [employer] Services:     Active Member of Clubs or Organizations:     Attends Club or Organization Meetings:     Marital Status:    Intimate Partner Violence:     Fear of Current or Ex-Partner:     Emotionally Abused:     Physically Abused:     Sexually Abused:        SURGICAL HISTORY  Past Surgical History:   Procedure Laterality Date    AORTIC VALVE SURGERY      BREAST LUMPECTOMY Right 09/08/2015    BREAST LUMPECTOMY Left 09/08/2015    BREAST SURGERY      lumpectomy for breast cancer    CATARACT EXTRACTION      CHOLECYSTECTOMY      FACIAL/NECK BIOPSY Right 9/24/2020    Procedure: EXCISION CHEEK LESION X2 WITH FROZEN SECTION;  Surgeon: Amilcar Gasca DO;  Location: [address] [place] [address] MAIN OR;  Service: Brandon Valdivia IR THORACENTESIS  2/28/2020    JOINT REPLACEMENT      KIDNEY SURGERY      stent placed for kidney    OTHER SURGICAL HISTORY      abdominal aneurysm surgery    MD ARTHRODESIS POSTERIOR ATLAS-AXIS C1-C2 N/A 5/1/2019    Procedure: C1-C2 lateral mass fixation fusion with possible sublaminar cables;  Surgeon: Fiorella Brand MD;  Location:  MAIN OR;  Service: Neurosurgery    REPLACEMENT TOTAL KNEE      left      Meds/Allergies     CURRENT MEDICATIONS  No current facility-administered medications for this encounter      Current Outpatient Medications:     albuterol (2 5 mg/3 mL) 0 083 % nebulizer solution, Take 1 vial (2 5 mg total) by nebulization 3 (three) times a day, Disp: 25 vial, Rfl: 3    albuterol (PROVENTIL HFA,VENTOLIN HFA) 90 mcg/act inhaler, Inhale 2 puffs every 4 (four) hours as needed for wheezing, Disp: , Rfl:     ALLOPURINOL PO, Take by mouth 2 (two) times a day , Disp: , Rfl:     calcium carbonate (OS-EMILY) 1250 (500 Ca) MG tablet, Take 1 tablet by mouth daily, Disp: , Rfl:     clopidogrel (PLAVIX) 75 mg tablet, TAKE 1 TABLET DAILY, Disp: 90 tablet, Rfl: 3    colchicine (COLCRYS) 0 6 mg tablet, Take 0 6 mg by mouth every other day, Disp: , Rfl:     docusate sodium (COLACE) 100 mg capsule, Take 100 mg by mouth 2 (two) times a day as needed for constipation, Disp: , Rfl:     fenofibrate (TRICOR) 48 mg tablet, Take 1 tablet (48 mg total) by mouth daily, Disp: 30 tablet, Rfl: 4    gabapentin (NEURONTIN) 300 mg capsule, Take 1 capsule (300 mg total) by mouth 3 (three) times a day, Disp: 60 capsule, Rfl: 0    HYDROcodone-acetaminophen (NORCO) 5-325 mg per tablet, Take 1 tablet by mouth every 8 (eight) hours as needed for pain, Disp: , Rfl:     isosorbide mononitrate (IMDUR) 30 mg 24 hr tablet, TAKE 1 TABLET DAILY, Disp: 90 tablet, Rfl: 3    levothyroxine 112 mcg tablet, Take 112 mcg by mouth daily , Disp: , Rfl:     lidocaine (LIDODERM) 5 %, Apply 1 patch topically daily Remove & Discard patch within 12 hours or as directed by MD, Disp: , Rfl:     methocarbamol (ROBAXIN) 500 mg tablet, Take 500 mg by mouth 3 (three) times a day as needed for muscle spasms, Disp: , Rfl:     metolazone (ZAROXOLYN) 5 mg tablet, Take 1 tablet (5 mg total) by mouth as needed (if gains 2lbs in 2 days ), Disp: 15 tablet, Rfl: 0    metoprolol tartrate (LOPRESSOR) 25 mg tablet, Take 1 tablet (25 mg total) by mouth 2 (two) times a day, Disp: 180 tablet, Rfl: 3    Multiple Vitamin (multivitamin) capsule, Take 1 capsule by mouth daily, Disp: , Rfl:     Multiple Vitamins-Calcium (MULTI-DAY/CALCIUM/EXTRA IRON PO), Take 1 tablet by mouth daily, Disp: , Rfl:     nitroglycerin (NITROSTAT) 0 4 mg SL tablet, , Disp: , Rfl:     Red Yeast Rice Extract (RED YEAST RICE PO), Take 600 mg by mouth daily, Disp: , Rfl:     sertraline (ZOLOFT) 25 mg tablet, 25 mg daily at bedtime , Disp: , Rfl:     torsemide (DEMADEX) 20 mg tablet, 40mg in am and 20 mg in afternoon, Disp: 270 tablet, Rfl: 3  (Not in a hospital admission)      ALLERGIES  Allergies   Allergen Reactions    Duloxetine Hcl Other (See Comments) and Hypertension    Duloxetine Hcl      Cymbalta;  Hemorrhagic stroke listed as reaction    Escitalopram      Other reaction(s): Urinary Retention    Pregabalin      Other reaction(s): Hypertension    Statins Myalgia    Tramadol      Other reaction(s): Hypertension    Triprolidine-Pse Other (See Comments)    Anastrozole Abdominal Pain    Anastrozole     Antihistamines, Diphenhydramine-Type     Exemestane      Aromasin; Muscle pain & cramps    Lexapro [Escitalopram]      Urinary retention    Lyrica [Pregabalin]      hypertension    Metformin      diarrhea    Oxycodone      Pt unsure      Statins      Muscle pain & cramps    Tramadol      hypertension    Triprolidine-Pseudoephedrine     Metformin Diarrhea     Objective     PHYSICAL EXAM    VITAL SIGNS: Blood pressure 161/70, pulse 61, temperature 98 4 °F (36 9 °C), temperature source Oral, resp  rate 18, weight 74 4 kg (164 lb), SpO2 97 %, not currently breastfeeding  Constitutional:  Appears well developed and well nourished, no acute distress, non-toxic appearance   Eyes:  PERRL, EOMI, conjunctivae pink, sclerae non-icteric    HENT:  Normocephalic/Atraumatic, no rhinorrhea, mucous membranes moist   Neck: normal range of motion, no tenderness, supple   Respiratory:  No respiratory distress, normal breath sounds   Cardiovascular:  Normal rate, normal rhythm    GI:  Soft, no tenderness, non-distended  :  No CVAT, no flank ecchymosis  Musculoskeletal:  No swelling or edema, no tenderness, no deformities  Integument:  Pink, warm, dry, Well hydrated, no rash, no erythema, no bullae   Lymphatic:  No cervical/ tonsillar/ submandibular lymphadenopathy noted   Neurologic:  Awake, Alert & oriented x 3, CN 2-12 intact, no focal neurological deficits, motor function intact  Psychiatric:  Speech and behavior appropriate       ED COURSE and MDM:    Assessment/Plan   Assessment:  Silvestre Osborne is a 80 y o  female presents for evaluation of persistent urinary symptoms, currently on ciprofloxacin  Recent urine culture shows resistance of E coli  Will need antibiotic change  Plan:  ID consult- recommend to start cefpodoxime PO, labs as needed, disposition as appropriate  CRITICAL CARE TIME:   0      Portions of the record may have been created with voice recognition software   Occasional wrong word or "sound a like" substitutions may have occurred due to the inherent limitations of voice recognition software       ED Provider  Electronically Signed by

## 2021-08-06 NOTE — DISCHARGE INSTRUCTIONS
Please take antibiotics as prescribed  Follow up with your PCP as needed  Return to the emergency department if symptoms do not improve or if you develop and new or worsening symptoms including fevers, chills, or altered mental status

## 2021-08-08 LAB — BACTERIA UR CULT: ABNORMAL

## 2021-08-09 NOTE — RESULT ENCOUNTER NOTE
Spoke with Daughter Dionne Metz, she believes patient/Mother is doing better, however would like to watch her more closely tomorrow and speak with us again      Please call again tomorrow 8/10   (I spoke with microbiology -  they cannot add cefpodoxime to the test )

## 2021-08-24 NOTE — ASSESSMENT & PLAN NOTE
· Previous chart review notes that she has been noncompliant with CPAP at night for past 10-15 years  · Currently on BiPAP for acute on chronic respiratory failure with hypoxia no

## 2021-08-26 NOTE — PROGRESS NOTES
8/27/2021    Dotty Chileledgar  1934  [id number]        Assessment  -Recurrent urinary tract infections    Discussion/Plan  Dell Barnett is a 80 y o  female who presents in cosultation    1  Recurrent urinary tract infections-   Patient's urine specimen in the office today is positive for nitrite and appears cloudy in color  Her recent urinalysis performed at an outside facility identified nitrite, culture pending  Her daughter states that she is currently experiencing lethargy and mild changes in mental status  Will send prescription for Macrobid to her pharmacy based on prior sensitivities  Would not recommend treatment for vaginal atrophy with topical estrogen as she reports a history of breast malignancy  We discussed possible colonization of bacteria in bladder  Would recommend treating future UTIs if patient showing signs of acute infection  I would like to order a renal ultrasound to ensure there is no anatomical cause of her symptoms  We also discussed referral to Infectious Disease given her recurrence urinary tract infection and multi-drug resistance  Patient is also noted to have numerous documented allergies  Encouraged patient to increase water intake  Consider flexible cystoscopy if she continues to experience recurrent UTIs  Follow up in 2 months for re-evaluation and to review renal ultrasound  Patient and daughter instructed to call sooner with any issues    -All questions answered, patient and daughter agree with plan     History of Present Illness  80 y o  female who presents in consultation today for evaluation of recurrent urinary tract infections  Patient accompanied by her daughter as primary historian  She was referred by her PCP  She was recently treated for E  Coli organism urinary tract infection on 8/6/2021  Patient noted to have at least 4 urinary tract infections this year      Her daughter states that PCP advised patient to proceed to emergency department for last urinary tract infection due to necessity of IV antibiotics  However, ER stated that patient was able to take oral antibiotics based on final sensitivity  She states that symptoms of urinary tract infection include dysuria, cloudy, odorous urine, and change in mental status  She reports longstanding history of urinary tract infections  Patient denies any prior urologic history, surgical intervention, or instrumentation  She states she has undergone a total hysterectomy over 40 years ago and has had 3 vaginal births  Additional history includes breast cancer  Patient denies any strong family history of bladder or kidney malignancy  Total hysterectomy, 3 children    Review of Systems  Review of Systems   Constitutional: Negative  HENT: Negative  Respiratory: Negative  Cardiovascular: Negative  Gastrointestinal: Negative  Genitourinary: Negative for decreased urine volume, difficulty urinating, dysuria, flank pain, frequency, hematuria, pelvic pain and urgency  Musculoskeletal: Negative  Skin: Negative  Neurological: Negative  Psychiatric/Behavioral: Negative          Past Medical History  Past Medical History:   Diagnosis Date    Arthritis     Breast cancer ([facility] ) 09/08/2015    BILATERAL    Cardiac disease     aortic valve transplant    [facility]     Compression fracture of body of thoracic vertebra ([facility])     CVA (cerebral vascular accident) ([facility] )     Diabetes mellitus ([facility] )     Disease of thyroid gland     Fibromyalgia, primary     H/O cervical fracture 01/09/2019    Hyperlipidemia     Hypertension     Neuropathy     AZUL (obstructive sleep apnea) 10/19/2019    Pressure injury of skin     Renal disorder     kidney stent    Stroke [facility])        Past Social History  Past Surgical History:   Procedure Laterality Date    AORTIC VALVE SURGERY      BREAST LUMPECTOMY Right 09/08/2015    BREAST LUMPECTOMY Left 09/08/2015    BREAST SURGERY      lumpectomy for breast cancer    CATARACT EXTRACTION      CHOLECYSTECTOMY      FACIAL/NECK BIOPSY Right 9/24/2020    Procedure: EXCISION CHEEK LESION X2 WITH FROZEN SECTION;  Surgeon: Chioma Lehman DO;  Location:  MAIN OR;  Service: Plastics    HYSTERECTOMY  1978    IR THORACENTESIS  2/28/2020    JOINT REPLACEMENT      KIDNEY SURGERY      stent placed for kidney    OTHER SURGICAL HISTORY      abdominal aneurysm surgery    CO ARTHRODESIS POSTERIOR ATLAS-AXIS C1-C2 N/A 5/1/2019    Procedure: C1-C2 lateral mass fixation fusion with possible sublaminar cables;  Surgeon: Minna Cheema MD;  Location:  MAIN OR;  Service: Neurosurgery    REPLACEMENT TOTAL KNEE      left        Past Family History  Family History   Problem Relation Age of Onset    Heart disease Mother     Arthritis Mother     Diabetes Mother     Heart failure Father     Arthritis Father     Other Father         enlargement of prostate     Dementia Father     No Known Problems Daughter     No Known Problems Maternal Grandmother     No Known Problems Maternal Grandfather     No Known Problems Paternal Grandmother     No Known Problems Paternal Grandfather     No Known Problems Maternal Aunt     No Known Problems Maternal Aunt     No Known Problems Maternal Aunt     No Known Problems Maternal Aunt     No Known Problems Paternal Aunt     No Known Problems Paternal Aunt     Prostate cancer Son     Prostate cancer Son        Past Social history  Social History     Socioeconomic History    Marital status:      Spouse name: Not on file    Number of children: 3    Years of education: Not on file    Highest education level: Not on file   Occupational History    Not on file   Tobacco Use    Smoking status: Never Smoker    Smokeless tobacco: Never Used   Vaping Use    Vaping Use: Never used   Substance and Sexual Activity    Alcohol use:  Yes    Drug use: Never    Sexual activity: Not Currently   Other Topics Concern    Not on file   Social History Narrative    ** Merged History Encounter **          Social Determinants of Health     Financial Resource Strain:     Difficulty of Paying Living Expenses:    Food Insecurity:     Worried About Running Out of Food in the Last Year:     Ran Out of Food in the Last Year:    Transportation Needs:     Lack of Transportation (Medical):      Lack of Transportation (Non-Medical):    Physical Activity:     Days of Exercise per Week:     Minutes of Exercise per Session:    Stress:     Feeling of Stress :    Social Connections:     Frequency of Communication with Friends and Family:     Frequency of Social Gatherings with Friends and Family:     Attends [employer] Services:     Active Member of Clubs or Organizations:     Attends Club or Organization Meetings:     Marital Status:    Intimate Partner Violence:     Fear of Current or Ex-Partner:     Emotionally Abused:     Physically Abused:     Sexually Abused:        Current Medications  Current Outpatient Medications   Medication Sig Dispense Refill    albuterol (2 5 mg/3 mL) 0 083 % nebulizer solution Take 1 vial (2 5 mg total) by nebulization 3 (three) times a day 25 vial 3    albuterol (PROVENTIL HFA,VENTOLIN HFA) 90 mcg/act inhaler Inhale 2 puffs every 4 (four) hours as needed for wheezing      ALLOPURINOL PO Take by mouth 2 (two) times a day       calcium carbonate (OS-EMILY) 1250 (500 Ca) MG tablet Take 1 tablet by mouth daily      clopidogrel (PLAVIX) 75 mg tablet TAKE 1 TABLET DAILY 90 tablet 3    colchicine (COLCRYS) 0 6 mg tablet Take 0 6 mg by mouth every other day      docusate sodium (COLACE) 100 mg capsule Take 100 mg by mouth 2 (two) times a day as needed for constipation      fenofibrate (TRICOR) 48 mg tablet Take 1 tablet (48 mg total) by mouth daily 30 tablet 4    gabapentin (NEURONTIN) 300 mg capsule Take 1 capsule (300 mg total) by mouth 3 (three) times a day 60 capsule 0    HYDROcodone-acetaminophen (NORCO) 5-325 mg per tablet Take 1 tablet by mouth every 8 (eight) hours as needed for pain      isosorbide mononitrate (IMDUR) 30 mg 24 hr tablet TAKE 1 TABLET DAILY 90 tablet 3    levothyroxine 112 mcg tablet Take 112 mcg by mouth daily       lidocaine (LIDODERM) 5 % Apply 1 patch topically daily Remove & Discard patch within 12 hours or as directed by MD      methocarbamol (ROBAXIN) 500 mg tablet Take 500 mg by mouth 3 (three) times a day as needed for muscle spasms      metoprolol tartrate (LOPRESSOR) 25 mg tablet Take 1 tablet (25 mg total) by mouth 2 (two) times a day 180 tablet 3    Multiple Vitamin (multivitamin) capsule Take 1 capsule by mouth daily      Multiple Vitamins-Calcium (MULTI-DAY/CALCIUM/EXTRA IRON PO) Take 1 tablet by mouth daily      nitroglycerin (NITROSTAT) 0 4 mg SL tablet       Red Yeast Rice Extract (RED YEAST RICE PO) Take 600 mg by mouth daily      sertraline (ZOLOFT) 25 mg tablet 25 mg daily at bedtime       torsemide (DEMADEX) 20 mg tablet 40mg in am and 20 mg in afternoon 270 tablet 3    metolazone (ZAROXOLYN) 5 mg tablet Take 1 tablet (5 mg total) by mouth as needed (if gains 2lbs in 2 days ) (Patient not taking: Reported on 8/27/2021) 15 tablet 0    nitrofurantoin (MACROBID) 100 mg capsule Take 1 capsule (100 mg total) by mouth 2 (two) times a day for 7 days 14 capsule 0     No current facility-administered medications for this visit  Allergies  Allergies   Allergen Reactions    Duloxetine Hcl Other (See Comments) and Hypertension    Duloxetine Hcl      Cymbalta;  Hemorrhagic stroke listed as reaction    Escitalopram      Other reaction(s): Urinary Retention    Pregabalin      Other reaction(s): Hypertension    Statins Myalgia    Tramadol      Other reaction(s): Hypertension    Triprolidine-Pse Other (See Comments)    Anastrozole Abdominal Pain    Anastrozole     Antihistamines, Diphenhydramine-Type     Exemestane      Aromasin; Muscle pain & cramps    Lexapro [Escitalopram]      Urinary retention    Lyrica [Pregabalin]      hypertension    Metformin      diarrhea    Oxycodone      Pt unsure      Statins      Muscle pain & cramps    Tramadol      hypertension    Triprolidine-Pseudoephedrine     Metformin Diarrhea       Past medical history, social history, family history, medications and allergies were reviewed  Vitals  Vitals:    08/27/21 1318   Weight: 75 3 kg (166 lb)   Height: 5' 11" (1 803 m)       Physical Exam  Physical Exam  Constitutional:       Appearance: Normal appearance  She is well-developed  HENT:      Head: Normocephalic  Eyes:      Pupils: Pupils are equal, round, and reactive to light  Pulmonary:      Effort: Pulmonary effort is normal    Abdominal:      Palpations: Abdomen is soft  Tenderness: There is no right CVA tenderness or left CVA tenderness  Genitourinary:     Comments: No suprapubic discomfort or distension   Musculoskeletal:         General: Normal range of motion  Cervical back: Normal range of motion  Comments: Gait unsteady, ambulates with walker   Skin:     General: Skin is warm and dry  Neurological:      General: No focal deficit present  Mental Status: She is alert and oriented to person, place, and time  Psychiatric:         Mood and Affect: Mood normal          Behavior: Behavior normal          Thought Content:  Thought content normal          Judgment: Judgment normal          Results    I have personally reviewed all pertinent lab results and reviewed with patient  Lab Results   Component Value Date    GLUCOSE 41 (L) 05/01/2019    CALCIUM 9 8 08/06/2021     11/06/2015    K 3 4 (L) 08/06/2021    CO2 31 08/06/2021    CL 99 (L) 08/06/2021    BUN 67 (H) 08/06/2021    CREATININE 2 06 (H) 08/06/2021     Lab Results   Component Value Date    WBC 6 38 08/06/2021    HGB 11 1 (L) 08/06/2021    HCT 34 4 (L) 08/06/2021     (H) 08/06/2021     08/06/2021     Recent Results (from the past 1 hour(s))   POCT urine dip    Collection Time: 08/27/21  1:17 PM   Result Value Ref Range    LEUKOCYTE ESTERASE,UA ++     NITRITE,UA +     SL AMB POCT UROBILINOGEN 0 2     POCT URINE PROTEIN -      PH,UA 6 0     BLOOD,UA +     SPECIFIC GRAVITY,UA 1 010     KETONES,UA -     BILIRUBIN,UA -     GLUCOSE, UA -      COLOR,UA yellow     CLARITY,UA cloudy

## 2021-08-27 ENCOUNTER — OFFICE VISIT (OUTPATIENT)
Dept: UROLOGY | Facility: AMBULATORY SURGERY CENTER | Age: 86
End: 2021-08-27
Payer: COMMERCIAL

## 2021-08-27 VITALS — HEIGHT: 71 IN | BODY MASS INDEX: 23.24 KG/M2 | WEIGHT: 166 LBS

## 2021-08-27 DIAGNOSIS — N39.0 RECURRENT UTI: Primary | ICD-10-CM

## 2021-08-27 LAB
SL AMB  POCT GLUCOSE, UA: NORMAL
SL AMB LEUKOCYTE ESTERASE,UA: NORMAL
SL AMB POCT BILIRUBIN,UA: NORMAL
SL AMB POCT BLOOD,UA: NORMAL
SL AMB POCT CLARITY,UA: NORMAL
SL AMB POCT COLOR,UA: YELLOW
SL AMB POCT KETONES,UA: NORMAL
SL AMB POCT NITRITE,UA: NORMAL
SL AMB POCT PH,UA: 6
SL AMB POCT SPECIFIC GRAVITY,UA: 1.01
SL AMB POCT URINE PROTEIN: NORMAL
SL AMB POCT UROBILINOGEN: 0.2

## 2021-08-27 PROCEDURE — 81002 URINALYSIS NONAUTO W/O SCOPE: CPT | Performed by: NURSE PRACTITIONER

## 2021-08-27 PROCEDURE — 87077 CULTURE AEROBIC IDENTIFY: CPT | Performed by: NURSE PRACTITIONER

## 2021-08-27 PROCEDURE — 1160F RVW MEDS BY RX/DR IN RCRD: CPT | Performed by: NURSE PRACTITIONER

## 2021-08-27 PROCEDURE — 87086 URINE CULTURE/COLONY COUNT: CPT | Performed by: NURSE PRACTITIONER

## 2021-08-27 PROCEDURE — 87186 SC STD MICRODIL/AGAR DIL: CPT | Performed by: NURSE PRACTITIONER

## 2021-08-27 PROCEDURE — 99204 OFFICE O/P NEW MOD 45 MIN: CPT | Performed by: NURSE PRACTITIONER

## 2021-08-27 RX ORDER — NITROFURANTOIN 25; 75 MG/1; MG/1
100 CAPSULE ORAL 2 TIMES DAILY
Qty: 14 CAPSULE | Refills: 0 | Status: SHIPPED | OUTPATIENT
Start: 2021-08-27 | End: 2021-09-03

## 2021-08-29 LAB — BACTERIA UR CULT: ABNORMAL

## 2021-09-03 ENCOUNTER — TELEPHONE (OUTPATIENT)
Dept: CARDIOLOGY CLINIC | Facility: CLINIC | Age: 86
End: 2021-09-03

## 2021-09-03 DIAGNOSIS — E78.1 HYPERTRIGLYCERIDEMIA: ICD-10-CM

## 2021-09-03 DIAGNOSIS — E78.2 MIXED HYPERLIPIDEMIA: Primary | ICD-10-CM

## 2021-09-03 DIAGNOSIS — I10 HTN (HYPERTENSION), BENIGN: ICD-10-CM

## 2021-09-03 NOTE — TELEPHONE ENCOUNTER
Daughter Zetta Baton called asking if her mother needs BW completed prior to OV with Dr Ana Miller in October  Lipid, BMP ordered and mailed to home and script for Zetia ordered for 14 days only in case Dr Ana Miller changes the script

## 2021-09-07 RX ORDER — FENOFIBRATE 48 MG/1
48 TABLET, COATED ORAL DAILY
Qty: 14 TABLET | Refills: 0 | Status: SHIPPED | OUTPATIENT
Start: 2021-09-07 | End: 2021-10-15 | Stop reason: SDUPTHER

## 2021-09-17 DIAGNOSIS — I50.42 CHRONIC COMBINED SYSTOLIC AND DIASTOLIC HEART FAILURE (HCC): ICD-10-CM

## 2021-09-17 RX ORDER — TORSEMIDE 20 MG/1
TABLET ORAL
Qty: 270 TABLET | Refills: 3 | Status: SHIPPED | OUTPATIENT
Start: 2021-09-17 | End: 2022-04-08 | Stop reason: SDUPTHER

## 2021-09-28 ENCOUNTER — CONSULT (OUTPATIENT)
Dept: INFECTIOUS DISEASES | Facility: CLINIC | Age: 86
End: 2021-09-28
Payer: COMMERCIAL

## 2021-09-28 VITALS
TEMPERATURE: 97.7 F | SYSTOLIC BLOOD PRESSURE: 118 MMHG | BODY MASS INDEX: 33.47 KG/M2 | DIASTOLIC BLOOD PRESSURE: 56 MMHG | HEART RATE: 58 BPM | RESPIRATION RATE: 16 BRPM | HEIGHT: 59 IN | WEIGHT: 166 LBS

## 2021-09-28 DIAGNOSIS — E66.9 OBESITY (BMI 30.0-34.9): ICD-10-CM

## 2021-09-28 DIAGNOSIS — N39.0 RECURRENT UTI: ICD-10-CM

## 2021-09-28 DIAGNOSIS — N18.4 STAGE 4 CHRONIC KIDNEY DISEASE (HCC): Primary | ICD-10-CM

## 2021-09-28 DIAGNOSIS — Z16.24 MULTIPLE DRUG RESISTANT ORGANISM (MDRO) CULTURE POSITIVE: ICD-10-CM

## 2021-09-28 PROCEDURE — 99204 OFFICE O/P NEW MOD 45 MIN: CPT | Performed by: INTERNAL MEDICINE

## 2021-10-08 DIAGNOSIS — I50.32 CHRONIC DIASTOLIC CHF (CONGESTIVE HEART FAILURE), NYHA CLASS 2 (HCC): ICD-10-CM

## 2021-10-15 ENCOUNTER — OFFICE VISIT (OUTPATIENT)
Dept: CARDIOLOGY CLINIC | Facility: CLINIC | Age: 86
End: 2021-10-15
Payer: COMMERCIAL

## 2021-10-15 VITALS
OXYGEN SATURATION: 96 % | HEIGHT: 59 IN | DIASTOLIC BLOOD PRESSURE: 60 MMHG | SYSTOLIC BLOOD PRESSURE: 100 MMHG | HEART RATE: 53 BPM | WEIGHT: 163.8 LBS | BODY MASS INDEX: 33.02 KG/M2

## 2021-10-15 DIAGNOSIS — E78.2 MIXED HYPERLIPIDEMIA: Primary | ICD-10-CM

## 2021-10-15 DIAGNOSIS — I10 HTN (HYPERTENSION), BENIGN: ICD-10-CM

## 2021-10-15 DIAGNOSIS — E11.40 CONTROLLED TYPE 2 DIABETES MELLITUS WITH DIABETIC NEUROPATHY, WITHOUT LONG-TERM CURRENT USE OF INSULIN (HCC): ICD-10-CM

## 2021-10-15 DIAGNOSIS — E78.1 HYPERTRIGLYCERIDEMIA: ICD-10-CM

## 2021-10-15 DIAGNOSIS — I70.1 RENAL ARTERY STENOSIS (HCC): ICD-10-CM

## 2021-10-15 DIAGNOSIS — I50.42 CHRONIC COMBINED SYSTOLIC AND DIASTOLIC HEART FAILURE (HCC): ICD-10-CM

## 2021-10-15 PROCEDURE — 99214 OFFICE O/P EST MOD 30 MIN: CPT | Performed by: INTERNAL MEDICINE

## 2021-10-15 RX ORDER — FENOFIBRATE 120 MG/1
120 TABLET ORAL DAILY
Qty: 90 TABLET | Refills: 2 | Status: SHIPPED | OUTPATIENT
Start: 2021-10-15 | End: 2022-06-30

## 2021-11-23 ENCOUNTER — HOSPITAL ENCOUNTER (OUTPATIENT)
Dept: RADIOLOGY | Age: 86
Discharge: HOME | End: 2021-11-23
Payer: COMMERCIAL

## 2021-11-23 DIAGNOSIS — N39.0 RECURRENT UTI: ICD-10-CM

## 2021-11-23 PROCEDURE — 76770 US EXAM ABDO BACK WALL COMP: CPT

## 2021-12-08 DIAGNOSIS — N39.0 RECURRENT UTI: Primary | ICD-10-CM

## 2022-01-18 ENCOUNTER — TELEPHONE (OUTPATIENT)
Dept: NEPHROLOGY | Facility: CLINIC | Age: 87
End: 2022-01-18

## 2022-01-18 NOTE — TELEPHONE ENCOUNTER
I reached out to the patient's daughter and have advise that she seek out to pcp in regards to sx  I have also re mentioned about completing the urinalysis that is in the chart  Kavon will contact patient's pcp that is through [facility] to put in an order so that way it is sent through [facility]  All questions answered

## 2022-01-18 NOTE — TELEPHONE ENCOUNTER
Patient's daughter Simiashley Peralta called the office with concerns of patient's sx that may possible related to her kidneys  Patient had been d/c from nephrology and to come back on an as needed basis  Patient has recently been experiencing low urine output and slight confusion  Zeinab Peralta may think that patient could have a UTI  Patient denies burning while urinating, foul or discolored urine  Patient has been very fatigued and has had loss of appetite however she has been taking in fluids  Patient was advised by her rheumatologist at [facility] to contact nephrology in regards to December labs  Patient has also had leg cramping and denies swelling over baseline  Overall, Zeinab Peralta would like to know if Dr Yesica Gusman has any recommendations as to what could be done as Zeinab Peralta is reluctant to take patient to an urgent care or ED  Please advise

## 2022-01-21 DIAGNOSIS — I10 ESSENTIAL HYPERTENSION: Chronic | ICD-10-CM

## 2022-01-21 RX ORDER — ISOSORBIDE MONONITRATE 30 MG/1
TABLET, EXTENDED RELEASE ORAL
Qty: 90 TABLET | Refills: 3 | Status: SHIPPED | OUTPATIENT
Start: 2022-01-21

## 2022-04-07 ENCOUNTER — TELEPHONE (OUTPATIENT)
Dept: OTHER | Facility: OTHER | Age: 87
End: 2022-04-07

## 2022-04-07 NOTE — PROGRESS NOTES
Heart Failure Outpatient Progress Note - Elli Rosales 80 y o  female MRN: [id number]    @ Encounter: [id number]      Assessment/Plan:    Patient Active Problem List    Diagnosis Date Noted    Recurrent UTI 09/28/2021    Multiple drug resistant organism (MDRO) culture positive 09/28/2021    HTN (hypertension), benign 12/14/2020    Chronic gout without tophus 09/30/2020    Toxic metabolic encephalopathy 12/10/9987    Aspiration pneumonia due to regurgitated food ([facility] ) 09/29/2020    Macrocytic anemia 09/29/2020    Nephrosclerosis arteriolar, stage 1-4 or unspecified chronic kidney disease 05/29/2020    Encounter for follow-up examination after completed treatment for malignant neoplasm 05/19/2020    Seasonal allergic rhinitis due to pollen 03/24/2020    Wheezing 11/04/2019    Stage 4 chronic kidney disease ([address] ) 10/29/2019    Alkalosis 10/29/2019    Chronic respiratory failure with hypoxia ([facility] ) 10/21/2019    Chronic anemia 10/19/2019    SD[facility] 10/19/2019    AZUL (obstructive sleep apnea) 10/19/2019    H/O spinal fusion 07/23/2019    Hyponatremia 01/21/2019    Chronic combined systolic and diastolic heart failure ([facility] ) 01/17/2019    Depression 01/17/2019    Essential hypertension 01/09/2019    Controlled type 2 diabetes mellitus with diabetic neuropathy, without long-term current use of insulin ([facility] ) 10/24/2017    SLE (systemic lupus erythematosus) ([facility] ) 10/24/2017    Vitamin D deficiency 08/22/2017    Renal artery stenosis ([facility] ) 02/08/2017    Peripheral polyneuropathy 02/07/2017    Hypothyroidism 09/23/2016    H/O: CVA (cerebrovascular accident) 09/23/2016    History of malignant neoplasm of breast 09/23/2016    History of aortic valve replacement 07/21/2015    RA (rheumatoid arthritis) ([facility] ) 07/21/2015    Hyperlipidemia 10/01/2013    Osteoporosis 10/01/2013    Obesity (BMI 30 0-34 9) 10/12/2012       # Chronic HFrEF, stage C, NYHA III  Etiology: given persistent wall motion abnormalities on echo must rule out ischemia (previously hesitant given CKD), vs HTN vs stress myopathy though typical in motion atypical in that echo in August was showing similar wall motion abnormalities  Offered LHC, family and pt preferred more comfort approach, less invasive     Weight: 157 lbs down from 184 lbs  When admitted in August recorded at 220 lbs  NT proBNP 9/29/20: 17,640  3/4/20: 16,419  11/4/19: 27,700     Studies- personally reviewed by me     CXR 2/26: moderate right sided pleural effusion  Thoracentesis 2/28/20: 1 liter fluid from right side     EKG: SR, low voltage     Echo 3/13/20: EF around 30%, dyskinesis of mid apical anterior and mid anterolateral walls  PASP: 50 mmHg  MR: Severe      Echocardiogram 10/22/19  LVEF: 40%, akinesis of distal half of anterior, lateral and inferior walls with akinesis of distal 3rd of septum, akinetic apex- looked like   Takotsubo  Grade 2 DD  LVIDd: 4 7 cm  RV: normal  MR: moderate to severe  PASP:  RVOT:   Other: moderate TR     Echo 8/17/19  LVEF: 45%, same wall motion abnormalities as above- but not nearly as severe               Lab Results   Component Value Date     NTBNP 27,700 (H) 11/04/2019         Neurohormonal Blockade:  --Beta-Blocker: metoprolol tartrate 25 mg BID  --ACEi, ARB or ARNi: CKD4  Imdur 30 mg daily  --Aldosterone Receptor Blocker:  --Diuretic: torsemide 40 mg AM, 20 mg PM- not on potassium and last K was 4 6     Sudden Cardiac Death Risk Reduction:  --ICD:      Electrical Resynchronization:  --Candidacy for BiV device:     Advanced Therapies (if appropriate):   --Inotrope:  --LVAD/Transplant Candidacy:     # right sided pleural effusion 2/26 CXR  Right thoracentesis 2/28: 1 liter  # CKD3, Cr 1 56 on 10/1/20, went up to 2 2, but now 1 72 on 4/2/21  GFR 26, cr up to 2 13 on 12/9/21  # subdural hematoma  # AZUL- non compliant with treatment  # HTN, hx of DONAVAN stenosis and stenting  - Plavix  # hyperlipidemia- fenofibrate 48 mg, red yeast rice  10/8/21: Tri 324, HDL 47,   4/2/21: Tri 588, HDL 45  # DM2  # anemia, , HgB 8 7 on 10/1/20, up to 10 9 on 4/2/21  # Hx of CVA x 2- mild right leg weakness  # hx of bioprosthetic AVR  # gait dysfunction- not very mobile  Uses walker     Today's Plan:  Lipids are improved from 588 to 324, but still high, can increase fenofibrate to 120 mg  Cr up to 2 2, decrease torsemide to 20 mg BID as weight has dropped  Getting therapy  Continue metoprolol for HFrEF  Has severe MR, stable  Cr up on 12/9/21 labs  --2g sodium diet   Weights daily     HPI:      62 WO female who is being evaluated for HFrEF  She has a history of bioprosthetic AVR, previous HFpEF, HTN, AZUL not using CPAP but uses oxygen at night, hyperlipidemia, hx of CVA, DM2  Use to see Dr Duc Alanis at [facility]  She was in [facility] following subdural hematoma from a fall  She became acutely short of breath requiring BiPap for stabilization  ABG did shows CO2 retention       Previously [facility] failure  She diuresed around 35 lbs  This was her first admit for heart failure  Discharged at 192 lbs, admitted at 220 lbs     Last visit 2/25/20: Urgent visit today for decreased oxygen saturations at home but states weights are not home  Daughter also said difficult to arouse when sleeping  She has hx of CO2 retention 10/21/19 ABG with CO2 to 81 requiring BiPap, required steroids, and nebs  It was in this setting she had Takotsubo pattern CM  She did not want to pursue LHC and could not pursue sleep study  Also could not tolerate nuclear stress test        In hospital in Sept with aspiration PNA due to regurgitated food    Interim:  Further increased to fenofibrate to 120 mg daily  Wt down even more  Edema in legs    Review of Systems   Constitutional: Negative for activity change, appetite change, fatigue and unexpected weight change (wt down a little more)     HENT: Negative for congestion and nosebleeds  Eyes: Negative  Respiratory: Negative for cough, chest tightness and shortness of breath  Cardiovascular: Positive for leg swelling  Negative for chest pain and palpitations  Gastrointestinal: Negative for abdominal distention  Endocrine: Negative  Genitourinary: Negative  Musculoskeletal: Negative  Skin: Negative  Neurological: Negative for dizziness, syncope and weakness  Hematological: Negative  Psychiatric/Behavioral: Negative  Past Medical History:   Diagnosis Date    Arthritis     Breast cancer ([place] [address] ) 09/08/2015    BILATERAL    Cardiac disease     aortic valve transplant    [facility]     Compression fracture of body of thoracic vertebra ([facility])     CVA (cerebral vascular accident) ([place] [address] )     Diabetes mellitus ([place] [address] )     Disease of thyroid gland     Fibromyalgia, primary     H/O cervical fracture 01/09/2019    Hyperlipidemia     Hypertension     Neuropathy     AZUL (obstructive sleep apnea) 10/19/2019    Pressure injury of skin     Renal disorder     kidney stent    Stroke ([facility])          Allergies   Allergen Reactions    Duloxetine Hcl Other (See Comments) and Hypertension    Duloxetine Hcl      Cymbalta; Hemorrhagic stroke listed as reaction    Escitalopram      Other reaction(s): Urinary Retention    Pregabalin      Other reaction(s): Hypertension    Statins Myalgia    Tramadol      Other reaction(s): Hypertension    Triprolidine-Pse Other (See Comments)    Anastrozole Abdominal Pain    Anastrozole     Antihistamines, Diphenhydramine-Type     Exemestane      Aromasin; Muscle pain & cramps    Lexapro [Escitalopram]      Urinary retention    Lyrica [Pregabalin]      hypertension    Metformin      diarrhea    Oxycodone      Pt unsure      Statins      Muscle pain & cramps    Tramadol      hypertension    Triprolidine-Pseudoephedrine     Metformin Diarrhea           Current Outpatient Medications:     albuterol (2 5 mg/3 mL) 0 083 % nebulizer solution, Take 1 vial (2 5 mg total) by nebulization 3 (three) times a day (Patient taking differently: Take 2 5 mg by nebulization 3 (three) times a day as needed ), Disp: 25 vial, Rfl: 3    albuterol (PROVENTIL HFA,VENTOLIN HFA) 90 mcg/act inhaler, Inhale 2 puffs every 4 (four) hours as needed for wheezing, Disp: , Rfl:     allopurinol (ZYLOPRIM) 100 mg tablet, Take 1 5 tablets by mouth daily , Disp: , Rfl:     calcium carbonate (OS-EMILY) 1250 (500 Ca) MG tablet, Take 1 tablet by mouth daily, Disp: , Rfl:     clopidogrel (PLAVIX) 75 mg tablet, TAKE 1 TABLET DAILY, Disp: 90 tablet, Rfl: 3    colchicine (COLCRYS) 0 6 mg tablet, Take 0 6 mg by mouth see administration instructions Every 3 days, Disp: , Rfl:     docusate sodium (COLACE) 100 mg capsule, Take 100 mg by mouth 2 (two) times a day as needed for constipation , Disp: , Rfl:     fenofibrate (FENOGLIDE) 120 MG TABS, Take 1 tablet (120 mg total) by mouth daily, Disp: 90 tablet, Rfl: 2    gabapentin (NEURONTIN) 300 mg capsule, Take 1 capsule (300 mg total) by mouth 3 (three) times a day, Disp: 60 capsule, Rfl: 0    HYDROcodone-acetaminophen (NORCO) 5-325 mg per tablet, Take 1 tablet by mouth every 8 (eight) hours as needed for pain, Disp: , Rfl:     isosorbide mononitrate (IMDUR) 30 mg 24 hr tablet, TAKE 1 TABLET DAILY, Disp: 90 tablet, Rfl: 3    levothyroxine 112 mcg tablet, Take 112 mcg by mouth daily , Disp: , Rfl:     lidocaine (LIDODERM) 5 %, Apply 1 patch topically as needed Remove & Discard patch within 12 hours or as directed by MD , Disp: , Rfl:     methocarbamol (ROBAXIN) 500 mg tablet, Take 500 mg by mouth 3 (three) times a day as needed for muscle spasms, Disp: , Rfl:     metolazone (ZAROXOLYN) 5 mg tablet, Take 1 tablet (5 mg total) by mouth as needed (if gains 2lbs in 2 days ) (Patient not taking: Reported on 8/27/2021), Disp: 15 tablet, Rfl: 0    metoprolol tartrate (LOPRESSOR) 25 mg tablet, TAKE 1 TABLET TWICE A DAY, Disp: 180 tablet, Rfl: 3    Multiple Vitamin (multivitamin) capsule, Take 1 capsule by mouth daily (Patient not taking: Reported on 10/15/2021), Disp: , Rfl:     Multiple Vitamins-Calcium (MULTI-DAY/CALCIUM/EXTRA IRON PO), Take 1 tablet by mouth daily, Disp: , Rfl:     nitroglycerin (NITROSTAT) 0 4 mg SL tablet, , Disp: , Rfl:     Red Yeast Rice Extract (RED YEAST RICE PO), Take 600 mg by mouth daily, Disp: , Rfl:     sertraline (ZOLOFT) 25 mg tablet, 25 mg daily at bedtime , Disp: , Rfl:     torsemide (DEMADEX) 20 mg tablet, TAKE 2 TABLETS (40 MG) IN THE MORNING AND 1 TABLET IN THE AFTERNOON, Disp: 270 tablet, Rfl: 3    Social History     Socioeconomic History    Marital status:      Spouse name: Not on file    Number of children: 3    Years of education: Not on file    Highest education level: Not on file   Occupational History    Not on file   Tobacco Use    Smoking status: Never Smoker    Smokeless tobacco: Never Used   Vaping Use    Vaping Use: Never used   Substance and Sexual Activity    Alcohol use:  Yes    Drug use: Never    Sexual activity: Not Currently   Other Topics Concern    Not on file   Social History Narrative    ** Merged History Encounter **          Social Determinants of Health     Financial Resource Strain: Not on file   Food Insecurity: Not on file   Transportation Needs: Not on file   Physical Activity: Not on file   Stress: Not on file   Social Connections: Not on file   Intimate Partner Violence: Not on file   Housing Stability: Not on file       Family History   Problem Relation Age of Onset    Heart disease Mother     Arthritis Mother     Diabetes Mother     Heart failure Father     Arthritis Father     Other Father         enlargement of prostate     Dementia Father     No Known Problems Daughter     No Known Problems Maternal Grandmother     No Known Problems Maternal Grandfather     No Known Problems Paternal Grandmother     No Known Problems Paternal Grandfather     No Known Problems Maternal Aunt     No Known Problems Maternal Aunt     No Known Problems Maternal Aunt     No Known Problems Maternal Aunt     No Known Problems Paternal Aunt     No Known Problems Paternal Aunt     Prostate cancer Son     Prostate cancer Son        Physical Exam:    Vitals: There were no vitals filed for this visit  Physical Exam  Constitutional:       Appearance: She is well-developed  HENT:      Head: Normocephalic and atraumatic  Eyes:      Pupils: Pupils are equal, round, and reactive to light  Neck:      Vascular: No JVD  Cardiovascular:      Rate and Rhythm: Normal rate and regular rhythm  Heart sounds: No murmur heard  Pulmonary:      Effort: Pulmonary effort is normal  No respiratory distress  Breath sounds: Normal breath sounds  Abdominal:      General: There is no distension  Palpations: Abdomen is soft  Tenderness: There is no abdominal tenderness  Musculoskeletal:         General: Normal range of motion  Cervical back: Normal range of motion  Right lower leg: Edema present  Left lower leg: Edema present  Skin:     General: Skin is warm and dry  Findings: No rash  Neurological:      Mental Status: She is alert and oriented to person, place, and time           Labs & Results:    Lab Results   Component Value Date    SODIUM 138 08/06/2021    K 3 4 (L) 08/06/2021    CL 99 (L) 08/06/2021    CO2 31 08/06/2021    BUN 67 (H) 08/06/2021    CREATININE 2 06 (H) 08/06/2021    GLUC 137 08/06/2021    CALCIUM 9 8 08/06/2021     Lab Results   Component Value Date    WBC 6 38 08/06/2021    HGB 11 1 (L) 08/06/2021    HCT 34 4 (L) 08/06/2021     (H) 08/06/2021     08/06/2021     Lab Results   Component Value Date    NTBNP 17,640 (H) 09/29/2020      Lab Results   Component Value Date    CHOLESTEROL 144 10/23/2019    CHOLESTEROL 204 (H) 08/16/2019    CHOLESTEROL 193 04/09/2019     Lab Results   Component Value Date    HDL 33 (L) 10/23/2019    HDL 56 08/16/2019    HDL 48 04/09/2019     Lab Results   Component Value Date    TRIG 223 (H) 10/23/2019    TRIG 99 08/16/2019    TRIG 397 (H) 04/09/2019     Lab Results   Component Value Date    NONHDLC 111 10/23/2019    [place] 148 08/16/2019    [place] 145 04/09/2019       EKG personally reviewed by Naseem Polk DO  Counseling / Coordination of Care  Time spent today 25 minutes  Greater than 50% of total time was spent with the patient and / or family counseling and / or coordination of care  We went over current diagnosis, most recent studies and any changes in treatment  Thank you for the opportunity to participate in the care of this patient      [facility] PULMONARY HYPERTENSION  MEDICAL DIRECTOR OF [place]

## 2022-04-07 NOTE — TELEPHONE ENCOUNTER
Patients daughter called stating mom has an appointment scheduled for tomorrow at 6 w  Dr Michelle Enamorado and she is not feeling well, she stated she did not want to cancel moms appt yet incase she feels better but would call to confirm if they are coming in the AM, please advise

## 2022-04-08 ENCOUNTER — OFFICE VISIT (OUTPATIENT)
Dept: CARDIOLOGY CLINIC | Facility: CLINIC | Age: 87
End: 2022-04-08
Payer: COMMERCIAL

## 2022-04-08 VITALS
SYSTOLIC BLOOD PRESSURE: 94 MMHG | HEART RATE: 57 BPM | BODY MASS INDEX: 31.69 KG/M2 | WEIGHT: 157.2 LBS | DIASTOLIC BLOOD PRESSURE: 58 MMHG | OXYGEN SATURATION: 97 % | HEIGHT: 59 IN

## 2022-04-08 DIAGNOSIS — I34.0 SEVERE MITRAL REGURGITATION: ICD-10-CM

## 2022-04-08 DIAGNOSIS — E11.40 CONTROLLED TYPE 2 DIABETES MELLITUS WITH DIABETIC NEUROPATHY, WITHOUT LONG-TERM CURRENT USE OF INSULIN (HCC): ICD-10-CM

## 2022-04-08 DIAGNOSIS — I50.42 CHRONIC COMBINED SYSTOLIC AND DIASTOLIC HEART FAILURE (HCC): ICD-10-CM

## 2022-04-08 DIAGNOSIS — I10 HTN (HYPERTENSION), BENIGN: ICD-10-CM

## 2022-04-08 DIAGNOSIS — I70.1 RENAL ARTERY STENOSIS (HCC): ICD-10-CM

## 2022-04-08 DIAGNOSIS — N18.4 STAGE 4 CHRONIC KIDNEY DISEASE (HCC): ICD-10-CM

## 2022-04-08 DIAGNOSIS — E78.1 HYPERTRIGLYCERIDEMIA: ICD-10-CM

## 2022-04-08 DIAGNOSIS — G47.33 OSA (OBSTRUCTIVE SLEEP APNEA): Primary | Chronic | ICD-10-CM

## 2022-04-08 PROCEDURE — 99214 OFFICE O/P EST MOD 30 MIN: CPT | Performed by: INTERNAL MEDICINE

## 2022-04-08 RX ORDER — TORSEMIDE 20 MG/1
20 TABLET ORAL 2 TIMES DAILY
Qty: 180 TABLET | Refills: 3
Start: 2022-04-08 | End: 2022-06-07 | Stop reason: SDUPTHER

## 2022-04-08 RX ORDER — BIMATOPROST 0.01 %
1 DROPS OPHTHALMIC (EYE)
COMMUNITY
Start: 2022-02-19

## 2022-04-20 ENCOUNTER — TELEPHONE (OUTPATIENT)
Dept: HEMATOLOGY ONCOLOGY | Facility: CLINIC | Age: 87
End: 2022-04-20

## 2022-04-20 ENCOUNTER — TELEPHONE (OUTPATIENT)
Dept: GYNECOLOGIC ONCOLOGY | Facility: CLINIC | Age: 87
End: 2022-04-20

## 2022-04-20 NOTE — TELEPHONE ENCOUNTER
Called patient and left message for Candice Lock  In message asked for a call back to r/s [facility],  if Candice Garcia or Melanie Lock calls back it is to make the appt a Survivorship appt

## 2022-05-10 DIAGNOSIS — Z86.73 H/O: CVA (CEREBROVASCULAR ACCIDENT): Chronic | ICD-10-CM

## 2022-05-10 RX ORDER — CLOPIDOGREL BISULFATE 75 MG/1
TABLET ORAL
Qty: 90 TABLET | Refills: 3 | Status: SHIPPED | OUTPATIENT
Start: 2022-05-10

## 2022-05-19 ENCOUNTER — HOSPITAL ENCOUNTER (INPATIENT)
Facility: HOSPITAL | Age: 87
LOS: 5 days | Discharge: NURSING, REHAB OR LONG-TERM CARE | DRG: 177 | End: 2022-05-26
Attending: SURGERY | Admitting: INTERNAL MEDICINE
Payer: COMMERCIAL

## 2022-05-19 ENCOUNTER — APPOINTMENT (EMERGENCY)
Dept: RADIOLOGY | Facility: HOSPITAL | Age: 87
DRG: 177 | End: 2022-05-19
Payer: COMMERCIAL

## 2022-05-19 DIAGNOSIS — E03.9 ACQUIRED HYPOTHYROIDISM: ICD-10-CM

## 2022-05-19 DIAGNOSIS — I10 ESSENTIAL HYPERTENSION: ICD-10-CM

## 2022-05-19 DIAGNOSIS — E11.9 DM TYPE 2 (DIABETES MELLITUS, TYPE 2) (HCC): ICD-10-CM

## 2022-05-19 DIAGNOSIS — U07.1 COVID: ICD-10-CM
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
## 2022-05-19 DIAGNOSIS — I12.9 CKD STAGE 4 SECONDARY TO HYPERTENSION (HCC): ICD-10-CM

## 2022-05-19 DIAGNOSIS — N18.4 CKD STAGE 4 SECONDARY TO HYPERTENSION (HCC): ICD-10-CM

## 2022-05-19 PROBLEM — N39.0 UTI (URINARY TRACT INFECTION): Status: ACTIVE | Noted: 2022-05-19

## 2022-05-19 PROBLEM — G47.33 OSA (OBSTRUCTIVE SLEEP APNEA): Status: ACTIVE | Noted: 2022-05-19

## 2022-05-19 PROBLEM — I70.1 RENAL ARTERY STENOSIS (HCC): Status: ACTIVE | Noted: 2022-05-19

## 2022-05-19 LAB
2HR DELTA HS TROPONIN: 4 NG/L
ALBUMIN SERPL BCP-MCNC: 2.6 G/DL (ref 3.5–5)
ALP SERPL-CCNC: 39 U/L (ref 46–116)
ALT SERPL W P-5'-P-CCNC: 17 U/L (ref 12–78)
ANION GAP SERPL CALCULATED.3IONS-SCNC: 8 MMOL/L (ref 4–13)
AST SERPL W P-5'-P-CCNC: 26 U/L (ref 5–45)
BACTERIA UR QL AUTO: ABNORMAL /HPF
BASE EXCESS BLDA CALC-SCNC: 4 MMOL/L (ref -2–3)
BASOPHILS # BLD AUTO: 0.02 THOUSANDS/ΜL (ref 0–0.1)
BASOPHILS NFR BLD AUTO: 0 % (ref 0–1)
BILIRUB SERPL-MCNC: 0.48 MG/DL (ref 0.2–1)
BILIRUB UR QL STRIP: NEGATIVE
BUN SERPL-MCNC: 48 MG/DL (ref 5–25)
CA-I BLD-SCNC: 1.14 MMOL/L (ref 1.12–1.32)
CALCIUM ALBUM COR SERPL-MCNC: 9.9 MG/DL (ref 8.3–10.1)
CALCIUM SERPL-MCNC: 8.8 MG/DL (ref 8.3–10.1)
CARDIAC TROPONIN I PNL SERPL HS: 181 NG/L
CARDIAC TROPONIN I PNL SERPL HS: 185 NG/L
CHLORIDE SERPL-SCNC: 105 MMOL/L (ref 100–108)
CLARITY UR: ABNORMAL
CO2 SERPL-SCNC: 24 MMOL/L (ref 21–32)
COLOR UR: ABNORMAL
CREAT SERPL-MCNC: 1.77 MG/DL (ref 0.6–1.3)
CRP SERPL QL: 87.3 MG/L
D DIMER PPP FEU-MCNC: 3.44 UG/ML FEU
EOSINOPHIL # BLD AUTO: 0.01 THOUSAND/ΜL (ref 0–0.61)
EOSINOPHIL NFR BLD AUTO: 0 % (ref 0–6)
ERYTHROCYTE [DISTWIDTH] IN BLOOD BY AUTOMATED COUNT: 15.1 % (ref 11.6–15.1)
GFR SERPL CREATININE-BSD FRML MDRD: 25 ML/MIN/1.73SQ M
GLUCOSE SERPL-MCNC: 133 MG/DL (ref 65–140)
GLUCOSE SERPL-MCNC: 139 MG/DL (ref 65–140)
GLUCOSE SERPL-MCNC: 196 MG/DL (ref 65–140)
GLUCOSE UR STRIP-MCNC: NEGATIVE MG/DL
HCO3 BLDA-SCNC: 28.8 MMOL/L (ref 24–30)
HCT VFR BLD AUTO: 32.1 % (ref 34.8–46.1)
HCT VFR BLD CALC: 35 % (ref 34.8–46.1)
HGB BLD-MCNC: 10 G/DL (ref 11.5–15.4)
HGB BLDA-MCNC: 11.9 G/DL (ref 11.5–15.4)
HGB UR QL STRIP.AUTO: ABNORMAL
HOLD SPECIMEN: NORMAL
HYALINE CASTS #/AREA URNS LPF: ABNORMAL /LPF
IMM GRANULOCYTES # BLD AUTO: 0.03 THOUSAND/UL (ref 0–0.2)
IMM GRANULOCYTES NFR BLD AUTO: 0 % (ref 0–2)
KETONES UR STRIP-MCNC: NEGATIVE MG/DL
LEUKOCYTE ESTERASE UR QL STRIP: ABNORMAL
LYMPHOCYTES # BLD AUTO: 0.74 THOUSANDS/ΜL (ref 0.6–4.47)
LYMPHOCYTES NFR BLD AUTO: 9 % (ref 14–44)
MCH RBC QN AUTO: 33.9 PG (ref 26.8–34.3)
MCHC RBC AUTO-ENTMCNC: 31.2 G/DL (ref 31.4–37.4)
MCV RBC AUTO: 109 FL (ref 82–98)
MONOCYTES # BLD AUTO: 0.33 THOUSAND/ΜL (ref 0.17–1.22)
MONOCYTES NFR BLD AUTO: 4 % (ref 4–12)
NEUTROPHILS # BLD AUTO: 6.9 THOUSANDS/ΜL (ref 1.85–7.62)
NEUTS SEG NFR BLD AUTO: 87 % (ref 43–75)
NITRITE UR QL STRIP: NEGATIVE
NON-SQ EPI CELLS URNS QL MICRO: ABNORMAL /HPF
NRBC BLD AUTO-RTO: 0 /100 WBCS
NT-PROBNP SERPL-MCNC: ABNORMAL PG/ML
PCO2 BLD: 30 MMOL/L (ref 21–32)
PCO2 BLD: 42.7 MM HG (ref 42–50)
PH BLD: 7.44 [PH] (ref 7.3–7.4)
PH UR STRIP.AUTO: 5.5 [PH]
PLATELET # BLD AUTO: 236 THOUSANDS/UL (ref 149–390)
PMV BLD AUTO: 9.5 FL (ref 8.9–12.7)
PO2 BLD: 25 MM HG (ref 35–45)
POTASSIUM BLD-SCNC: 4.5 MMOL/L (ref 3.5–5.3)
POTASSIUM SERPL-SCNC: 4.1 MMOL/L (ref 3.5–5.3)
PROCALCITONIN SERPL-MCNC: 0.2 NG/ML
PROT SERPL-MCNC: 6.8 G/DL (ref 6.4–8.2)
PROT UR STRIP-MCNC: ABNORMAL MG/DL
RBC # BLD AUTO: 2.95 MILLION/UL (ref 3.81–5.12)
RBC #/AREA URNS AUTO: ABNORMAL /HPF
RENAL EPI CELLS #/AREA URNS HPF: PRESENT /[HPF]
SAO2 % BLD FROM PO2: 47 % (ref 60–85)
SARS-COV-2 AG UPPER RESP QL IA: POSITIVE
SODIUM BLD-SCNC: 136 MMOL/L (ref 136–145)
SODIUM SERPL-SCNC: 137 MMOL/L (ref 136–145)
SP GR UR STRIP.AUTO: 1.02 (ref 1–1.03)
SPECIMEN SOURCE: ABNORMAL
TRANS CELLS #/AREA URNS HPF: PRESENT /[HPF]
UROBILINOGEN UR STRIP-ACNC: <2 MG/DL
WBC # BLD AUTO: 8.03 THOUSAND/UL (ref 4.31–10.16)
WBC #/AREA URNS AUTO: ABNORMAL /HPF
WBC CLUMPS # UR AUTO: PRESENT /UL

## 2022-05-19 PROCEDURE — 70450 CT HEAD/BRAIN W/O DYE: CPT

## 2022-05-19 PROCEDURE — 80053 COMPREHEN METABOLIC PANEL: CPT | Performed by: INTERNAL MEDICINE

## 2022-05-19 PROCEDURE — 85379 FIBRIN DEGRADATION QUANT: CPT | Performed by: INTERNAL MEDICINE

## 2022-05-19 PROCEDURE — 85025 COMPLETE CBC W/AUTO DIFF WBC: CPT | Performed by: INTERNAL MEDICINE

## 2022-05-19 PROCEDURE — 87811 SARS-COV-2 COVID19 W/OPTIC: CPT | Performed by: SURGERY

## 2022-05-19 PROCEDURE — 76705 ECHO EXAM OF ABDOMEN: CPT | Performed by: SURGERY

## 2022-05-19 PROCEDURE — 96375 TX/PRO/DX INJ NEW DRUG ADDON: CPT

## 2022-05-19 PROCEDURE — 72125 CT NECK SPINE W/O DYE: CPT

## 2022-05-19 PROCEDURE — 82330 ASSAY OF CALCIUM: CPT

## 2022-05-19 PROCEDURE — 86901 BLOOD TYPING SEROLOGIC RH(D): CPT | Performed by: INTERNAL MEDICINE

## 2022-05-19 PROCEDURE — 93308 TTE F-UP OR LMTD: CPT | Performed by: SURGERY

## 2022-05-19 PROCEDURE — 94760 N-INVAS EAR/PLS OXIMETRY 1: CPT

## 2022-05-19 PROCEDURE — 99203 OFFICE O/P NEW LOW 30 MIN: CPT | Performed by: SURGERY

## 2022-05-19 PROCEDURE — 99285 EMERGENCY DEPT VISIT HI MDM: CPT

## 2022-05-19 PROCEDURE — 86900 BLOOD TYPING SEROLOGIC ABO: CPT | Performed by: INTERNAL MEDICINE

## 2022-05-19 PROCEDURE — 85014 HEMATOCRIT: CPT

## 2022-05-19 PROCEDURE — 96374 THER/PROPH/DIAG INJ IV PUSH: CPT

## 2022-05-19 PROCEDURE — 71045 X-RAY EXAM CHEST 1 VIEW: CPT

## 2022-05-19 PROCEDURE — 73560 X-RAY EXAM OF KNEE 1 OR 2: CPT

## 2022-05-19 PROCEDURE — 82803 BLOOD GASES ANY COMBINATION: CPT

## 2022-05-19 PROCEDURE — 36415 COLL VENOUS BLD VENIPUNCTURE: CPT | Performed by: EMERGENCY MEDICINE

## 2022-05-19 PROCEDURE — 84484 ASSAY OF TROPONIN QUANT: CPT | Performed by: INTERNAL MEDICINE

## 2022-05-19 PROCEDURE — 84295 ASSAY OF SERUM SODIUM: CPT

## 2022-05-19 PROCEDURE — 84145 PROCALCITONIN (PCT): CPT | Performed by: INTERNAL MEDICINE

## 2022-05-19 PROCEDURE — 82947 ASSAY GLUCOSE BLOOD QUANT: CPT

## 2022-05-19 PROCEDURE — 86850 RBC ANTIBODY SCREEN: CPT | Performed by: INTERNAL MEDICINE

## 2022-05-19 PROCEDURE — 82948 REAGENT STRIP/BLOOD GLUCOSE: CPT

## 2022-05-19 PROCEDURE — 84132 ASSAY OF SERUM POTASSIUM: CPT

## 2022-05-19 PROCEDURE — 99220 PR INITIAL OBSERVATION CARE/DAY 70 MINUTES: CPT | Performed by: INTERNAL MEDICINE

## 2022-05-19 PROCEDURE — 86140 C-REACTIVE PROTEIN: CPT | Performed by: INTERNAL MEDICINE

## 2022-05-19 PROCEDURE — 81001 URINALYSIS AUTO W/SCOPE: CPT | Performed by: SURGERY

## 2022-05-19 PROCEDURE — 83880 ASSAY OF NATRIURETIC PEPTIDE: CPT | Performed by: INTERNAL MEDICINE

## 2022-05-19 RX ORDER — SENNOSIDES 8.6 MG
1 TABLET ORAL DAILY
Status: DISCONTINUED | OUTPATIENT
Start: 2022-05-19 | End: 2022-05-26 | Stop reason: HOSPADM

## 2022-05-19 RX ORDER — MULTIVIT-MIN/FERROUS GLUCONATE 9 MG/15 ML
15 LIQUID (ML) ORAL DAILY
Status: DISCONTINUED | OUTPATIENT
Start: 2022-05-19 | End: 2022-05-26 | Stop reason: HOSPADM

## 2022-05-19 RX ORDER — ENOXAPARIN SODIUM 100 MG/ML
40 INJECTION SUBCUTANEOUS DAILY
Status: DISCONTINUED | OUTPATIENT
Start: 2022-05-19 | End: 2022-05-19

## 2022-05-19 RX ORDER — ALLOPURINOL 300 MG/1
150 TABLET ORAL DAILY
Status: DISCONTINUED | OUTPATIENT
Start: 2022-05-19 | End: 2022-05-26 | Stop reason: HOSPADM

## 2022-05-19 RX ORDER — TORSEMIDE 20 MG/1
20 TABLET ORAL DAILY
Status: DISCONTINUED | OUTPATIENT
Start: 2022-05-19 | End: 2022-05-22

## 2022-05-19 RX ORDER — DOCUSATE SODIUM 100 MG/1
100 CAPSULE, LIQUID FILLED ORAL 2 TIMES DAILY
Status: DISCONTINUED | OUTPATIENT
Start: 2022-05-19 | End: 2022-05-26 | Stop reason: HOSPADM

## 2022-05-19 RX ORDER — TORSEMIDE 20 MG/1
40 TABLET ORAL DAILY
Status: DISCONTINUED | OUTPATIENT
Start: 2022-05-19 | End: 2022-05-26 | Stop reason: HOSPADM

## 2022-05-19 RX ORDER — GABAPENTIN 300 MG/1
300 CAPSULE ORAL 3 TIMES DAILY
Status: DISCONTINUED | OUTPATIENT
Start: 2022-05-19 | End: 2022-05-26 | Stop reason: HOSPADM

## 2022-05-19 RX ORDER — ISOSORBIDE MONONITRATE 30 MG/1
30 TABLET, EXTENDED RELEASE ORAL DAILY
Status: DISCONTINUED | OUTPATIENT
Start: 2022-05-19 | End: 2022-05-26 | Stop reason: HOSPADM

## 2022-05-19 RX ORDER — ONDANSETRON 2 MG/ML
4 INJECTION INTRAMUSCULAR; INTRAVENOUS ONCE
Status: COMPLETED | OUTPATIENT
Start: 2022-05-19 | End: 2022-05-19

## 2022-05-19 RX ORDER — FENOFIBRATE 145 MG/1
145 TABLET, COATED ORAL DAILY
Status: DISCONTINUED | OUTPATIENT
Start: 2022-05-19 | End: 2022-05-26 | Stop reason: HOSPADM

## 2022-05-19 RX ORDER — LEVOTHYROXINE SODIUM 112 UG/1
112 TABLET ORAL
Status: DISCONTINUED | OUTPATIENT
Start: 2022-05-20 | End: 2022-05-26 | Stop reason: HOSPADM

## 2022-05-19 RX ORDER — METHOCARBAMOL 750 MG/1
750 TABLET, FILM COATED ORAL EVERY 6 HOURS PRN
Status: DISCONTINUED | OUTPATIENT
Start: 2022-05-19 | End: 2022-05-26 | Stop reason: HOSPADM

## 2022-05-19 RX ORDER — ENOXAPARIN SODIUM 100 MG/ML
30 INJECTION SUBCUTANEOUS DAILY
Status: DISCONTINUED | OUTPATIENT
Start: 2022-05-19 | End: 2022-05-26 | Stop reason: HOSPADM

## 2022-05-19 RX ORDER — INSULIN LISPRO 100 [IU]/ML
4-20 INJECTION, SOLUTION INTRAVENOUS; SUBCUTANEOUS
Status: DISCONTINUED | OUTPATIENT
Start: 2022-05-19 | End: 2022-05-26 | Stop reason: HOSPADM

## 2022-05-19 RX ORDER — NITROGLYCERIN 0.4 MG/1
0.4 TABLET SUBLINGUAL
Status: DISCONTINUED | OUTPATIENT
Start: 2022-05-19 | End: 2022-05-26 | Stop reason: HOSPADM

## 2022-05-19 RX ORDER — ACETAMINOPHEN 325 MG/1
650 TABLET ORAL EVERY 6 HOURS PRN
Status: DISCONTINUED | OUTPATIENT
Start: 2022-05-19 | End: 2022-05-26 | Stop reason: HOSPADM

## 2022-05-19 RX ORDER — HYDROCODONE BITARTRATE AND ACETAMINOPHEN 5; 325 MG/1; MG/1
1 TABLET ORAL EVERY 6 HOURS PRN
Status: DISCONTINUED | OUTPATIENT
Start: 2022-05-19 | End: 2022-05-26 | Stop reason: HOSPADM

## 2022-05-19 RX ORDER — CLOPIDOGREL BISULFATE 75 MG/1
75 TABLET ORAL DAILY
Status: DISCONTINUED | OUTPATIENT
Start: 2022-05-19 | End: 2022-05-26 | Stop reason: HOSPADM

## 2022-05-19 RX ORDER — CALCIUM CARBONATE 200(500)MG
1000 TABLET,CHEWABLE ORAL DAILY PRN
Status: DISCONTINUED | OUTPATIENT
Start: 2022-05-19 | End: 2022-05-26 | Stop reason: HOSPADM

## 2022-05-19 RX ORDER — ALBUTEROL SULFATE 2.5 MG/3ML
2.5 SOLUTION RESPIRATORY (INHALATION) EVERY 6 HOURS PRN
Status: DISCONTINUED | OUTPATIENT
Start: 2022-05-19 | End: 2022-05-19

## 2022-05-19 RX ORDER — FENTANYL CITRATE 50 UG/ML
INJECTION, SOLUTION INTRAMUSCULAR; INTRAVENOUS CODE/TRAUMA/SEDATION MEDICATION
Status: COMPLETED | OUTPATIENT
Start: 2022-05-19 | End: 2022-05-19

## 2022-05-19 RX ORDER — DEXAMETHASONE SODIUM PHOSPHATE 4 MG/ML
6 INJECTION, SOLUTION INTRA-ARTICULAR; INTRALESIONAL; INTRAMUSCULAR; INTRAVENOUS; SOFT TISSUE EVERY 24 HOURS
Status: DISCONTINUED | OUTPATIENT
Start: 2022-05-19 | End: 2022-05-20

## 2022-05-19 RX ORDER — ALBUTEROL SULFATE 90 UG/1
2 AEROSOL, METERED RESPIRATORY (INHALATION) EVERY 4 HOURS PRN
Status: DISCONTINUED | OUTPATIENT
Start: 2022-05-19 | End: 2022-05-26 | Stop reason: HOSPADM

## 2022-05-19 RX ORDER — METHOCARBAMOL 100 MG/ML
750 INJECTION, SOLUTION INTRAMUSCULAR; INTRAVENOUS ONCE
Status: DISPENSED | OUTPATIENT
Start: 2022-05-19 | End: 2022-05-22

## 2022-05-19 RX ORDER — FENTANYL CITRATE 50 UG/ML
50 INJECTION, SOLUTION INTRAMUSCULAR; INTRAVENOUS ONCE
Status: DISCONTINUED | OUTPATIENT
Start: 2022-05-19 | End: 2022-05-19

## 2022-05-19 RX ORDER — ONDANSETRON 2 MG/ML
4 INJECTION INTRAMUSCULAR; INTRAVENOUS EVERY 6 HOURS PRN
Status: DISCONTINUED | OUTPATIENT
Start: 2022-05-19 | End: 2022-05-26 | Stop reason: HOSPADM

## 2022-05-19 RX ORDER — LIDOCAINE 50 MG/G
1 PATCH TOPICAL DAILY
Status: DISCONTINUED | OUTPATIENT
Start: 2022-05-19 | End: 2022-05-26 | Stop reason: HOSPADM

## 2022-05-19 RX ORDER — SERTRALINE HYDROCHLORIDE 25 MG/1
25 TABLET, FILM COATED ORAL DAILY
Status: DISCONTINUED | OUTPATIENT
Start: 2022-05-19 | End: 2022-05-26 | Stop reason: HOSPADM

## 2022-05-19 RX ADMIN — SENNOSIDES 8.6 MG: 8.6 TABLET, FILM COATED ORAL at 18:00

## 2022-05-19 RX ADMIN — METOPROLOL TARTRATE 25 MG: 25 TABLET, FILM COATED ORAL at 21:53

## 2022-05-19 RX ADMIN — REMDESIVIR 200 MG: 100 INJECTION, POWDER, LYOPHILIZED, FOR SOLUTION INTRAVENOUS at 18:56

## 2022-05-19 RX ADMIN — ONDANSETRON 4 MG: 2 INJECTION INTRAMUSCULAR; INTRAVENOUS at 05:58

## 2022-05-19 RX ADMIN — GABAPENTIN 300 MG: 300 CAPSULE ORAL at 23:45

## 2022-05-19 RX ADMIN — ALLOPURINOL 150 MG: 300 TABLET ORAL at 17:41

## 2022-05-19 RX ADMIN — ISOSORBIDE MONONITRATE 30 MG: 30 TABLET, EXTENDED RELEASE ORAL at 17:41

## 2022-05-19 RX ADMIN — ENOXAPARIN SODIUM 30 MG: 30 INJECTION SUBCUTANEOUS at 21:53

## 2022-05-19 RX ADMIN — CLOPIDOGREL BISULFATE 75 MG: 75 TABLET ORAL at 17:41

## 2022-05-19 RX ADMIN — LIDOCAINE 5% 1 PATCH: 700 PATCH TOPICAL at 18:00

## 2022-05-19 RX ADMIN — DEXAMETHASONE SODIUM PHOSPHATE 6 MG: 4 INJECTION INTRA-ARTICULAR; INTRALESIONAL; INTRAMUSCULAR; INTRAVENOUS; SOFT TISSUE at 17:42

## 2022-05-19 RX ADMIN — METHOCARBAMOL TABLETS 750 MG: 750 TABLET, COATED ORAL at 17:41

## 2022-05-19 RX ADMIN — DOCUSATE SODIUM 100 MG: 100 CAPSULE, LIQUID FILLED ORAL at 17:41

## 2022-05-19 RX ADMIN — FENTANYL CITRATE 50 MCG: 50 INJECTION INTRAMUSCULAR; INTRAVENOUS at 05:23

## 2022-05-19 RX ADMIN — GABAPENTIN 300 MG: 300 CAPSULE ORAL at 18:00

## 2022-05-19 RX ADMIN — FENOFIBRATE 145 MG: 145 TABLET ORAL at 17:41

## 2022-05-19 RX ADMIN — HYDROCODONE BITARTRATE AND ACETAMINOPHEN 1 TABLET: 5; 325 TABLET ORAL at 23:45

## 2022-05-19 RX ADMIN — SERTRALINE 25 MG: 25 TABLET, FILM COATED ORAL at 17:41

## 2022-05-19 RX ADMIN — INSULIN LISPRO 4 UNITS: 100 INJECTION, SOLUTION INTRAVENOUS; SUBCUTANEOUS at 18:35

## 2022-05-19 RX ADMIN — SENNOSIDES A AND B 15 ML: 8.8 SYRUP ORAL at 18:00

## 2022-05-19 RX ADMIN — TORSEMIDE 20 MG: 20 TABLET ORAL at 17:41

## 2022-05-19 NOTE — ED NOTES
Pt daughter called and requested covid swab  Secured message sent to trauma resident       Jani Dubois RN  65/63/97 [phone]

## 2022-05-19 NOTE — H&P
H&P - Trauma   Diana Price 80 y o  female MRN: [id number]  Unit/Bed#: ED 11 Encounter: [id number]    Trauma Alert: Level B   Model of Arrival: Ambulance    Trauma Team: Attending To, Residents Dalia Domingo and Fellow Roxana Vanegas  Consultants:     None     Assessment/Plan   Active Problems / Assessment:   Fall  Neck Pain  Knee Pain     Plan:   Trauma assessment negative for acute injuries, just chronic healed c-spine fractures  Will clear c-collar and discharge from the emergency department after trial of PO and ambulation    History of Present Illness     Chief Complaint: Fall  Mechanism:Fall     HPI:    Diana Price is a 80 y o  female who presents following an unwitnessed fall at home  Patient states she does not remember the event but does not think that she felt lightheaded or dizzy prior to falling  She thinks she may have just lost her balance  She denies hitting her head or losing consciousness  She is currently complaining of cervical neck pain and left knee pain  She takes Plavix for previous aortic valve replacement  Review of Systems   Constitutional: Negative  HENT: Negative  Eyes: Negative  Respiratory: Negative  Cardiovascular: Negative  Gastrointestinal: Negative  Endocrine: Negative  Genitourinary: Negative  Musculoskeletal: Positive for neck pain  Skin: Negative  Neurological: Negative  Psychiatric/Behavioral: Negative  12-point, complete review of systems was reviewed and negative except as stated above  Historical Information     History reviewed  No pertinent past medical history  History reviewed  No pertinent surgical history  There is no immunization history on file for this patient  Last Tetanus: Unknown  Family History: Non-contributory    1  Before the illness or injury that brought you to the Emergency, did you need someone to help you on a regular basis? 0=No   2   Since the illness or injury that brought you to the Emergency, have you needed more help than usual to take care of yourself? 0=No   3  Have you been hospitalized for one or more nights during the past 6 months (excluding a stay in the Emergency Department)? 0=No   4  In general, do you see well? 0=Yes   5  In general, do you have serious problems with your memory? 0=No   6  Do you take more than three different medications everyday? 1=Yes   TOTAL   1     Did you order a geriatric consult if the score was 2 or greater?: n/a     Meds/Allergies   current meds:   No current facility-administered medications for this encounter  and PTA meds:   None     Allergies have not been reviewed; Not on File    Objective   Initial Vitals:   Temperature: 99 °F (37 2 °C) (05/19/22 0522)  Pulse: 84 (05/19/22 0522)  Respirations: (!) 0 (05/19/22 0516)  Blood Pressure: (!) 214/80 (05/19/22 0522)    Primary Survey:   Airway:        Status: patent;        Pre-hospital Interventions: none        Hospital Interventions: none  Breathing:        Pre-hospital Interventions: none       Effort: normal       Right breath sounds: normal       Left breath sounds: normal  Circulation:        Rhythm: regular       Rate: regular   Right Pulses Left Pulses    R radial: 2+    R pedal: 2+     L radial: 2+    L pedal: 2+       Disability:        GCS: Eye: 4; Verbal: 5 Motor: 6 Total: 15       Right Pupil: 3 mm;  round;  reactive         Left Pupil:  3 mm;  round;  reactive      R Motor Strength L Motor Strength    R : 5/5  R dorsiflex: 5/5  R plantarflex: 5/5 L : 5/5  L dorsiflex: 5/5  L plantarflex: 5/5        Sensory:  No sensory deficit  Exposure:       Completed: Yes      Secondary Survey:  Physical Exam  Constitutional:       Appearance: Normal appearance  HENT:      Head: Normocephalic and atraumatic  Right Ear: External ear normal       Left Ear: External ear normal       Nose: Nose normal       Mouth/Throat:      Mouth: Mucous membranes are moist       Pharynx: Oropharynx is clear     Eyes: Extraocular Movements: Extraocular movements intact  Conjunctiva/sclera: Conjunctivae normal       Pupils: Pupils are equal, round, and reactive to light  Neck:      Comments: C-collar in place  Cardiovascular:      Rate and Rhythm: Normal rate and regular rhythm  Pulses: Normal pulses  Pulmonary:      Effort: Pulmonary effort is normal    Abdominal:      General: Abdomen is flat  Palpations: Abdomen is soft  Tenderness: There is no abdominal tenderness  Musculoskeletal:         General: Normal range of motion  Skin:     General: Skin is warm and dry  Neurological:      General: No focal deficit present  Mental Status: She is alert and oriented to person, place, and time  Psychiatric:         Mood and Affect: Mood normal          Behavior: Behavior normal          Invasive Devices  Report    Peripheral Intravenous Line  Duration           Peripheral IV 05/19/22 Dorsal (posterior); Left Forearm <1 day              Lab Results: Results: I have personally reviewed all pertinent laboratory/tests results    Imaging Results: I have personally reviewed pertinent reports  Chest Xray(s): negative for acute findings   FAST exam(s): negative for acute findings   CT Scan(s): negative for acute findings   Additional Xray(s): negative for acute findings     Other Studies: None    Code Status: No Order  Advance Directive and Living Will:      Power of :    POLST:    I have spent 30 minutes with Patient  today in which greater than 50% of this time was spent in counseling/coordination of care regarding Diagnostic results, Patient and family education, Risk factor reductions and Impressions

## 2022-05-19 NOTE — H&P
[address] [facility]  H&P- Agata Grills 1934, 80 y o  female MRN: [id number]  Unit/Bed#: [facility] 826-01 Encounter: [id number]  Primary Care Provider: Drew Paz DO   Date and time admitted to hospital: 5/19/2022  5:11 AM    * Fall  Assessment & Plan  · Patient was unwitnessed fall at home  · Work up in ED no spine and brain injury  · Radiology contacted regarding knee arthoplasty is displaced  · Orthopedics consulted  Hypothyroidism  Assessment & Plan  Continue levothyroxine 112 mg daily     Lab test positive for detection of COVID-19 virus  Assessment & Plan  Patient was positive for covid on Thursday  Start treatment as patient has diabetes  Mild covid pathway   Will obtain labs  Start remdesivir   Monitor closely   Not on oxygen, monitor closely       AZUL (obstructive sleep apnea)  Assessment & Plan  Monitor closely might need CPAP at night      Renal artery stenosis [facility])  Assessment & Plan  Monitor blood pressure closely     UTI (urinary tract infection)  Assessment & Plan  Urine is positive for leukocytes  Will monitor off antibiotics for now  If has fever, wbc count elevation would start antibiotics  Essential hypertension  Assessment & Plan  Continue with metoprolol 25 mg bid  Demodex 50 mg am and 20 mg afternoon    DM type 2 (diabetes mellitus, type 2) ([facility] [address] )  Assessment & Plan  No results found for: HGBA1C    No results for input(s): POCGLU in the last 72 hours  Blood Sugar Average: Last 72 hrs:    SSI premeal fingerstick check  Avoid hypoglycemia  On steroids  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 (HCC)  Assessment & Plan  · Avoid nephrotoxic medication  · Avoid hypotension   · Monitor in and out        VTE Pharmacologic Prophylaxis: VTE Score: 9 High Risk (Score >/= 5) - Pharmacological DVT Prophylaxis Ordered: enoxaparin (Lovenox)  Sequential Compression Devices Ordered    Code Status: Level 1 - Full Code    Discussion with family: Updated contact person (daughter) via phone  Anticipated Length of Stay: Patient will be admitted on an inpatient basis with an anticipated length of stay of greater than 2 midnights secondary to fall, has arthoplasty displacement, needs orthopedics  Total Time for Visit, including Counseling / Coordination of Care: 45 minutes Greater than 50% of this total time spent on direct patient counseling and coordination of care  Chief Complaint: fall     History of Present Illness:  Digna Jerome is a 80 y o  female with a PMH of hypothyroidism, HTN, DM, CKD stage 4, cva, AZUL, renal artery stenosis  who presents with unwitnessed fall at home  With discussion with daughter, she was negative for covid at home with home covid tests  She also has positive leukocytes in urine, however, does not have nitrates  She is covid positive in ED  Possible reason for fall  She is mild pathway  Await labs  Initially seen by trauma, patient was cleared by them for spinal fractures, head trauma  Called by radiology patient has possible displaced arthroplasty of left knee joint  She  Needs orthopedic consult  Review of Systems:  Review of Systems   Constitutional: Negative for chills and fever  HENT: Negative for ear pain and sore throat  Eyes: Negative for pain and visual disturbance  Respiratory: Negative for cough and shortness of breath  Cardiovascular: Negative for chest pain and palpitations  Gastrointestinal: Negative for abdominal pain and vomiting  Genitourinary: Positive for dysuria  Negative for hematuria  Musculoskeletal: Positive for gait problem and joint swelling  Negative for arthralgias and back pain  Skin: Negative for color change and rash  Neurological: Negative for dizziness, seizures and syncope  Hematological: Does not bruise/bleed easily  All other systems reviewed and are negative  Past Medical and Surgical History:   History reviewed  No pertinent past medical history      History reviewed  No pertinent surgical history  Meds/Allergies:  Prior to Admission medications    Medication Sig Start Date End Date Taking? Authorizing Provider   nirmatrelvir & ritonavir (Paxlovid) tablet therapy pack Take 2 tablets by mouth in the morning and 2 tablets in the evening  Do all this for 5 days  Take 1 nirmatrelvir tablet + 1 ritonavir tablet together per dose  5/19/22 5/24/22 Yes Vinod Parisi MD     I have reviewed home medications with patient personally  Allergies: No Known Allergies    Social History:  Marital Status:    Occupation:    Patient Pre-hospital Living Situation: Home  Patient Pre-hospital Level of Mobility: walks  Patient Pre-hospital Diet Restrictions: diabetic  Substance Use History:   Social History     Substance and Sexual Activity   Alcohol Use None     Social History     Tobacco Use   Smoking Status Not on file   Smokeless Tobacco Not on file     Social History     Substance and Sexual Activity   Drug Use Not on file       Family History:  No family history on file  Physical Exam:     Vitals:   Blood Pressure: 159/67 (05/19/22 1349)  Pulse: 89 (05/19/22 1349)  Temperature: 99 °F (37 2 °C) (05/19/22 0522)  Temp Source: Tympanic (05/19/22 0522)  Respirations: 16 (05/19/22 1349)  SpO2: 97 % (05/19/22 1349)    Physical Exam      Additional Data:     Lab Results:  Results from last 7 days   Lab Units 05/19/22  0526   I STAT HEMOGLOBIN g/dl 11 9   HEMATOCRIT, ISTAT % 35     Results from last 7 days   Lab Units 05/19/22  0526   CO2, I-STAT mmol/L 30                       Imaging: Reviewed radiology reports from this admission including: xray(s)  XR knee 1 or 2 vw left   Final Result by Efrain Zambrano MD (05/19 9957)      There is no acute fracture or dislocation  The femoral component of the total knee arthroplasty appears angulated with increased loss of joint space medially  Recommend orthopedic consultation           The study was marked in [facility] for immediate notification  Workstation performed: [id number]         TRAUMA - CT head wo contrast   Final Result by Haresh Winter MD (05/19 4561)      No acute intracranial abnormality  There is generalized atrophy  Advanced microangiopathic changes are present  Old bilateral lacunae are present  Paranasal sinus disease, as described above  Please see discussion  I personally discussed this study with Evon Bell on 5/19/2022 at 6:05 AM                      Workstation performed: [id number]         TRAUMA - CT spine cervical wo contrast   Final Result by Haresh Winter MD (05/19 1991)      No acute cervical spine fracture or traumatic malalignment  There is old healed fracture deformity of the dens and upper body of C2  There has been prior posterior spinal fusion at C1-2  There is multilevel degenerative change of the spine  I personally discussed this study with Evon Bell on 5/19/2022 at 5:49 AM                       Workstation performed: [id number]         XR trauma multiple   Final Result by Troy Gaffney MD (05/19 6944)      Right basilar pneumonia with small right pleural effusion  The study was marked in [facility] for immediate notification  Workstation performed: [id number]         XR chest 1 view   Final Result by Troy Gaffney MD (05/19 9374)      Right basilar pneumonia with small right pleural effusion  The study was marked in [facility] for immediate notification  Workstation performed: [id number]             EKG and Other Studies Reviewed on Admission:   · EKG: NSR  HR 80     ** Please Note: This note has been constructed using a voice recognition system   **

## 2022-05-19 NOTE — QUICK NOTE
Responded to Trauma  Checked in with Pt as she was settling in the room in ED   established a relationship of support and care, facilitated storytelling and life review  Pt was eager to engage in conversation and appreciated prayer  Lives in small attached apartment to daughter's home  Daughter is COVID +

## 2022-05-19 NOTE — RESPIRATORY THERAPY NOTE
05/19/22 1733   Respiratory Protocol   Protocol Initiated? Yes   Protocol Selection Respiratory   Language Barrier? No   Medical & Social History Reviewed? Yes   Diagnostic Studies Reviewed? Yes   Physical Assessment Performed? Yes   Respiratory Plan Home Bronchodilator Patient pathway   Respiratory Assessment   Assessment Type Assess only   General Appearance Awake; Alert   Respiratory Pattern Normal   Chest Assessment Chest expansion symmetrical   Bilateral Breath Sounds Clear;Diminished   Cough Non-productive;Dry   Resp Comments pt  admitted after fall at home; tested + for COVID; found no ducumented pulmonary hx, but pt  says she uses prn albuterol MDI at home; BS aren clear; pt  denies any breathing difficulty; will dc udn as per COVID policy and order MDI   Additional Assessments   SpO2 95 %

## 2022-05-19 NOTE — ASSESSMENT & PLAN NOTE
Patient was positive for covid on Thursday     Start treatment as patient has diabetes  Mild covid pathway   Will obtain labs  Start remdesivir   Monitor closely   Not on oxygen, monitor closely

## 2022-05-19 NOTE — ASSESSMENT & PLAN NOTE
Urine is positive for leukocytes  Will monitor off antibiotics for now  If has fever, wbc count elevation would start antibiotics

## 2022-05-19 NOTE — ASSESSMENT & PLAN NOTE
No results found for: HGBA1C    No results for input(s): POCGLU in the last 72 hours  Blood Sugar Average: Last 72 hrs:    SSI premeal fingerstick check  Avoid hypoglycemia  On steroids

## 2022-05-19 NOTE — QUICK NOTE
Cervical Collar Clearance: The patient had a CT scan of the cervical spine demonstrating no acute injury  On exam, the patient had no midline point tenderness or paresthesias/numbness/weakness in the extremities  The patient had full range of motion (was then able to flex, extend, and rotate head laterally) without pain  There were no distracting injuries and the patient was not intoxicated  The patient's cervical spine was cleared radiologically and clinically  Cervical collar removed at this time  Patient endorsed some paraspinal pain and was requesting to keep a collar for comfort so she was provided with an aspen collar and instructed that she may wear it as needed for comfort      Kane Buchanan MD  5/19/2022 6:32 AM

## 2022-05-19 NOTE — DISCHARGE INSTRUCTIONS
You may wear the aspen cervical collar for comfort as needed  Discharge Instructions - Orthopedics  Claudetta Busman 80 y o  female MRN: [id number]  Unit/Bed#: [facility] 826-01    Weight Bearing Status: You may bear weight as tolerated on your left leg  DVT prophylaxis  No DVT ppx per ortho recs     Pain:  Continue analgesics as directed    No future orthopaedic follow-up is necessary  You may follow up with the surgeon who replaced your knee   If you do wish to have future orthopaedic consultation, please do not hesitate to call the office at [phone]

## 2022-05-19 NOTE — PROCEDURES
POC FAST US    Date/Time: 5/19/2022 9:16 AM  Performed by: Jonah Thrasher MD  Authorized by: Jonah Thrasher MD     Patient location:  Trauma  Other Assisting Provider: Yes (comment) (Kerri Men)    Procedure details:     Exam Type:  Diagnostic    Indications: blunt abdominal trauma      Assess for:  Intra-abdominal fluid and pericardial effusion    Technique: FAST      Views obtained:  Heart - Pericardial sac, RUQ - Chinchilla's Pouch, Suprapubic - Pouch of Juanjo and LUQ - Splenorenal space    Image quality: diagnostic      Image availability:  Images available in PACS  FAST Findings:     RUQ (Hepatorenal) free fluid: absent      LUQ (Splenorenal) free fluid: absent      Suprapubic free fluid: absent      Cardiac wall motion: identified      Pericardial effusion: absent    Interpretation:     Impressions: negative

## 2022-05-19 NOTE — ASSESSMENT & PLAN NOTE
· Patient was unwitnessed fall at home  · Work up in ED no spine and brain injury  · Radiology contacted regarding knee arthoplasty is displaced  · Orthopedics consulted

## 2022-05-19 NOTE — CONSULTS
Orthopedics   Mary Youssef 80 y o  female MRN: [id number]  Unit/Bed#: [facility] 826-01      Chief Complaint:   Left knee pain     HPI:   80 y  o female ambulates with walker complaining of mild left knee pain after an unwitnessed fall at home while getting off the commode  Patient reports she does not recall if she experienced head strike but reports that she feels like she lost her balance  She reports some mild left knee pain as her main complaint  She is oriented to self, month, [place]  She is not oriented to place, but when she is reminded she is in the hospital, she reports she knows she is at [facility]  She reports she had a left knee replacement 30 years ago in 1993 by Dr Mg Yeni  She reports a right knee surgery around the same time where she reports someone fixed her meniscus  She is currently COVID + but does not report SOB, chest pain, lightheadedness  Review Of Systems:   · Skin: Normal  · Neuro: See HPI  · Musculoskeletal: See HPI  · 14 point review of systems negative except as stated above     Past Medical History:   History reviewed  No pertinent past medical history  Past Surgical History:   History reviewed  No pertinent surgical history  Family History:  Family history reviewed and non-contributory  No family history on file  Social History:  Social History     Socioeconomic History    Marital status:       Spouse name: None    Number of children: None    Years of education: None    Highest education level: None   Occupational History    None   Tobacco Use    Smoking status: None    Smokeless tobacco: None   Substance and Sexual Activity    Alcohol use: None    Drug use: None    Sexual activity: None   Other Topics Concern    None   Social History Narrative    None     Social Determinants of Health     Financial Resource Strain: Not on file   Food Insecurity: Not on file   Transportation Needs: Not on file   Physical Activity: Not on file Stress: Not on file   Social Connections: Not on file   Intimate Partner Violence: Not on file   Housing Stability: Not on file       Allergies:   No Known Allergies        Labs:  0   Lab Value Date/Time    HCT 35 05/19/2022 0526    HGB 11 9 05/19/2022 0526       Meds:    Current Facility-Administered Medications:     acetaminophen (TYLENOL) tablet 650 mg, 650 mg, Oral, Q6H PRN, Angelique Lozano MD    albuterol inhalation solution 2 5 mg, 2 5 mg, Nebulization, Q6H PRN, Angelique Lozano MD    allopurinol (ZYLOPRIM) tablet 150 mg, 150 mg, Oral, Daily, Angelique Lozano MD    calcium carbonate (TUMS) chewable tablet 1,000 mg, 1,000 mg, Oral, Daily PRN, Angelique Lozano MD    clopidogrel (PLAVIX) tablet 75 mg, 75 mg, Oral, Daily, Angelique Lozano MD    dexamethasone (DECADRON) injection 6 mg, 6 mg, Intravenous, Q24H, Angelique Lozano MD    docusate sodium (COLACE) capsule 100 mg, 100 mg, Oral, BID, Angelique Lozano MD    enoxaparin (LOVENOX) subcutaneous injection 40 mg, 40 mg, Subcutaneous, Daily, Angelique Lozano MD    fenofibrate (TRICOR) tablet 145 mg, 145 mg, Oral, Daily, Angelique Lozano MD    gabapentin (NEURONTIN) capsule 300 mg, 300 mg, Oral, TID, Angelique Lozano MD    HYDROcodone-acetaminophen (NORCO) 5-325 mg per tablet 1 tablet, 1 tablet, Oral, Q6H PRN, Angelique Lozano MD    insulin lispro (HumaLOG) 100 units/mL subcutaneous injection 4-20 Units, 4-20 Units, Subcutaneous, TID AC **AND** Fingerstick Glucose (POCT), , , TID AC, Angelique Lozano MD    isosorbide mononitrate (IMDUR) 24 hr tablet 30 mg, 30 mg, Oral, Daily, Angelique Lozano MD    [START ON 5/20/2022] levothyroxine tablet 112 mcg, 112 mcg, Oral, Early Morning, Angelique Lozano MD    lidocaine (LIDODERM) 5 % patch 1 patch, 1 patch, Topical, Daily, Angelique Lozano MD    methocarbamol (ROBAXIN) injection 750 mg, 750 mg, Intravenous, Once, Angelique Lozano MD    methocarbamol (ROBAXIN) tablet 750 mg, 750 mg, Oral, Q6H PRN, Ratna Garnica MD    metoprolol tartrate (LOPRESSOR) tablet 25 mg, 25 mg, Oral, Q12H [address], Ratna Garnica MD    multivitamin with iron-minerals liquid 15 mL, 15 mL, Oral, Daily, Ratna Garnica MD    nitroglycerin (NITROSTAT) SL tablet 0 4 mg, 0 4 mg, Sublingual, Q5 Min PRN, Ratna Garnica MD    ondansetron [facility]) injection 4 mg, 4 mg, Intravenous, Q6H PRN, Ratna Garnica MD    remdesivir [facility]) 200 mg in sodium chloride 0 9 % 290 mL IVPB, 200 mg, Intravenous, Q24H **FOLLOWED BY** [START ON 5/20/2022] remdesivir (Veklury) 100 mg in sodium chloride 0 9 % 270 mL IVPB, 100 mg, Intravenous, Q24H, Ratna Garnica MD    senna (SENOKOT) tablet 8 6 mg, 1 tablet, Oral, Daily, Ratna Garnica MD    sertraline (ZOLOFT) tablet 25 mg, 25 mg, Oral, Daily, Ratna Garnica MD    torsemide (DEMADEX) tablet 20 mg, 20 mg, Oral, Daily, Ratna Granica MD    torsemide (DEMADEX) tablet 40 mg, 40 mg, Oral, Daily, Ratna Garnica MD    Blood Culture:   No results found for: BLOODCX    Wound Culture:   No results found for: WOUNDCULT    Ins and Outs:  No intake/output data recorded  Physical Exam:   /67 (BP Location: Right arm)   Pulse 89   Temp 99 °F (37 2 °C) (Tympanic)   Resp 16   SpO2 97%   Gen: Alert and oriented to person, place, time  HEENT: EOMI, eyes clear, moist mucus membranes, hearing intact  Respiratory: Bilateral chest rise  No audible wheezing found  Cardiovascular: Regular Rate and Rhythm  Abdomen: soft nontender/nondistended  Musculoskeletal: left lower extremity  · Skin intact  Well healed anterior surgical scar over knee  No erythema or warmth  · No Tenderness to palpation over knee  · No effusion  · Can not perform straight leg raise on either leg  This is most likely due to patient's inability to understand instructions versus overall weakness  · Stable to varus/valgus stress, negative lachmans, posterior drawer   Patient did have pain in the posterior knee while performing these exams due to my hand placement  · Able to tolerate passive flexion to 90 degrees without issue  · Will actively flex knee to 30 degrees   · Sensation intact L3-S1  · Able to perform ankle dorsi/plantar flexion,  And fire EHL/FHL  · 2+ DP pulse    Radiology:   I personally reviewed the films  X-rays left knee shows total knee arthroplasty hardware with asymmetric poly wear on the medial side  No fracture or dislocation seen     _*_*_*_*_*_*_*_*_*_*_*_*_*_*_*_*_*_*_*_*_*_*_*_*_*_*_*_*_*_*_*_*_*_*_*_*_*_*_*_*_*    Assessment:  80 y o  female status post ground level fall with posterior left knee pain and asymmetric poly wear on the medial portion of her left total knee arthroplasty noted on X-ray  Patient does not require urgent orthopaedic intervention at this time  She may follow up with her surgeon of record  Plan:   · Weight bearing as tolerated  left lower extremity  · PT  · Pain control  · There is no height or weight on file to calculate BMI  Carl Schwartz Recommend behavior modifications, nutrition and physical activity    · Dispo: Ortho will follow  · Case seen in conjunction with senior resident on call      Kayla Beltran MD

## 2022-05-20 LAB
4HR DELTA HS TROPONIN: 2 NG/L
ABO GROUP BLD: NORMAL
ALBUMIN SERPL BCP-MCNC: 2.7 G/DL (ref 3.5–5)
ALP SERPL-CCNC: 39 U/L (ref 46–116)
ALT SERPL W P-5'-P-CCNC: 17 U/L (ref 12–78)
ANION GAP SERPL CALCULATED.3IONS-SCNC: 7 MMOL/L (ref 4–13)
AST SERPL W P-5'-P-CCNC: 26 U/L (ref 5–45)
BASOPHILS # BLD AUTO: 0.01 THOUSANDS/ΜL (ref 0–0.1)
BASOPHILS NFR BLD AUTO: 0 % (ref 0–1)
BILIRUB SERPL-MCNC: 0.45 MG/DL (ref 0.2–1)
BILIRUB UR QL STRIP: NEGATIVE
BLD GP AB SCN SERPL QL: NEGATIVE
BUN SERPL-MCNC: 55 MG/DL (ref 5–25)
CALCIUM ALBUM COR SERPL-MCNC: 9.9 MG/DL (ref 8.3–10.1)
CALCIUM SERPL-MCNC: 8.9 MG/DL (ref 8.3–10.1)
CARDIAC TROPONIN I PNL SERPL HS: 183 NG/L
CHLORIDE SERPL-SCNC: 103 MMOL/L (ref 100–108)
CLARITY UR: CLEAR
CO2 SERPL-SCNC: 26 MMOL/L (ref 21–32)
COLOR UR: COLORLESS
CREAT SERPL-MCNC: 1.92 MG/DL (ref 0.6–1.3)
CRP SERPL QL: 121 MG/L
EOSINOPHIL # BLD AUTO: 0 THOUSAND/ΜL (ref 0–0.61)
EOSINOPHIL NFR BLD AUTO: 0 % (ref 0–6)
ERYTHROCYTE [DISTWIDTH] IN BLOOD BY AUTOMATED COUNT: 15 % (ref 11.6–15.1)
GFR SERPL CREATININE-BSD FRML MDRD: 22 ML/MIN/1.73SQ M
GLUCOSE P FAST SERPL-MCNC: 146 MG/DL (ref 65–99)
GLUCOSE SERPL-MCNC: 133 MG/DL (ref 65–140)
GLUCOSE SERPL-MCNC: 146 MG/DL (ref 65–140)
GLUCOSE UR STRIP-MCNC: NEGATIVE MG/DL
HCT VFR BLD AUTO: 31.4 % (ref 34.8–46.1)
HGB BLD-MCNC: 9.9 G/DL (ref 11.5–15.4)
HGB UR QL STRIP.AUTO: NEGATIVE
HOLD SPECIMEN: NORMAL
IMM GRANULOCYTES # BLD AUTO: 0.03 THOUSAND/UL (ref 0–0.2)
IMM GRANULOCYTES NFR BLD AUTO: 0 % (ref 0–2)
KETONES UR STRIP-MCNC: NEGATIVE MG/DL
LEUKOCYTE ESTERASE UR QL STRIP: NEGATIVE
LYMPHOCYTES # BLD AUTO: 1.25 THOUSANDS/ΜL (ref 0.6–4.47)
LYMPHOCYTES NFR BLD AUTO: 16 % (ref 14–44)
MAGNESIUM SERPL-MCNC: 2.6 MG/DL (ref 1.6–2.6)
MCH RBC QN AUTO: 33.6 PG (ref 26.8–34.3)
MCHC RBC AUTO-ENTMCNC: 31.5 G/DL (ref 31.4–37.4)
MCV RBC AUTO: 106 FL (ref 82–98)
MONOCYTES # BLD AUTO: 0.22 THOUSAND/ΜL (ref 0.17–1.22)
MONOCYTES NFR BLD AUTO: 3 % (ref 4–12)
NEUTROPHILS # BLD AUTO: 6.43 THOUSANDS/ΜL (ref 1.85–7.62)
NEUTS SEG NFR BLD AUTO: 81 % (ref 43–75)
NITRITE UR QL STRIP: NEGATIVE
NRBC BLD AUTO-RTO: 0 /100 WBCS
PH UR STRIP.AUTO: 5 [PH]
PHOSPHATE SERPL-MCNC: 2.9 MG/DL (ref 2.3–4.1)
PLATELET # BLD AUTO: 218 THOUSANDS/UL (ref 149–390)
PMV BLD AUTO: 9.2 FL (ref 8.9–12.7)
POTASSIUM SERPL-SCNC: 4.1 MMOL/L (ref 3.5–5.3)
PROT SERPL-MCNC: 6.8 G/DL (ref 6.4–8.2)
PROT UR STRIP-MCNC: NEGATIVE MG/DL
RBC # BLD AUTO: 2.95 MILLION/UL (ref 3.81–5.12)
RH BLD: NEGATIVE
SODIUM SERPL-SCNC: 136 MMOL/L (ref 136–145)
SP GR UR STRIP.AUTO: 1.01 (ref 1–1.03)
SPECIMEN EXPIRATION DATE: NORMAL
UROBILINOGEN UR STRIP-ACNC: <2 MG/DL
WBC # BLD AUTO: 7.94 THOUSAND/UL (ref 4.31–10.16)

## 2022-05-20 PROCEDURE — 83735 ASSAY OF MAGNESIUM: CPT | Performed by: INTERNAL MEDICINE

## 2022-05-20 PROCEDURE — 84100 ASSAY OF PHOSPHORUS: CPT | Performed by: INTERNAL MEDICINE

## 2022-05-20 PROCEDURE — NC001 PR NO CHARGE: Performed by: ORTHOPAEDIC SURGERY

## 2022-05-20 PROCEDURE — 82948 REAGENT STRIP/BLOOD GLUCOSE: CPT

## 2022-05-20 PROCEDURE — 97116 GAIT TRAINING THERAPY: CPT

## 2022-05-20 PROCEDURE — 86140 C-REACTIVE PROTEIN: CPT | Performed by: INTERNAL MEDICINE

## 2022-05-20 PROCEDURE — 81003 URINALYSIS AUTO W/O SCOPE: CPT | Performed by: INTERNAL MEDICINE

## 2022-05-20 PROCEDURE — 85025 COMPLETE CBC W/AUTO DIFF WBC: CPT | Performed by: INTERNAL MEDICINE

## 2022-05-20 PROCEDURE — 80053 COMPREHEN METABOLIC PANEL: CPT | Performed by: INTERNAL MEDICINE

## 2022-05-20 PROCEDURE — 99225 PR SBSQ OBSERVATION CARE/DAY 25 MINUTES: CPT | Performed by: INTERNAL MEDICINE

## 2022-05-20 PROCEDURE — 99225 PR SBSQ OBSERVATION CARE/DAY 25 MINUTES: CPT | Performed by: SURGERY

## 2022-05-20 PROCEDURE — 97163 PT EVAL HIGH COMPLEX 45 MIN: CPT

## 2022-05-20 RX ADMIN — LEVOTHYROXINE SODIUM 112 MCG: 112 TABLET ORAL at 06:04

## 2022-05-20 RX ADMIN — GABAPENTIN 300 MG: 300 CAPSULE ORAL at 21:24

## 2022-05-20 RX ADMIN — GABAPENTIN 300 MG: 300 CAPSULE ORAL at 18:46

## 2022-05-20 RX ADMIN — GABAPENTIN 300 MG: 300 CAPSULE ORAL at 08:23

## 2022-05-20 RX ADMIN — ISOSORBIDE MONONITRATE 30 MG: 30 TABLET, EXTENDED RELEASE ORAL at 08:22

## 2022-05-20 RX ADMIN — TORSEMIDE 40 MG: 20 TABLET ORAL at 08:28

## 2022-05-20 RX ADMIN — SERTRALINE 25 MG: 25 TABLET, FILM COATED ORAL at 08:23

## 2022-05-20 RX ADMIN — SENNOSIDES A AND B 15 ML: 8.8 SYRUP ORAL at 08:30

## 2022-05-20 RX ADMIN — FENOFIBRATE 145 MG: 145 TABLET ORAL at 08:23

## 2022-05-20 RX ADMIN — DOCUSATE SODIUM 100 MG: 100 CAPSULE, LIQUID FILLED ORAL at 08:22

## 2022-05-20 RX ADMIN — LIDOCAINE 5% 1 PATCH: 700 PATCH TOPICAL at 18:46

## 2022-05-20 RX ADMIN — CLOPIDOGREL BISULFATE 75 MG: 75 TABLET ORAL at 08:23

## 2022-05-20 RX ADMIN — METOPROLOL TARTRATE 25 MG: 25 TABLET, FILM COATED ORAL at 21:24

## 2022-05-20 RX ADMIN — REMDESIVIR 100 MG: 100 INJECTION, POWDER, LYOPHILIZED, FOR SOLUTION INTRAVENOUS at 18:45

## 2022-05-20 RX ADMIN — DOCUSATE SODIUM 100 MG: 100 CAPSULE, LIQUID FILLED ORAL at 18:46

## 2022-05-20 RX ADMIN — ALLOPURINOL 150 MG: 300 TABLET ORAL at 08:24

## 2022-05-20 RX ADMIN — ENOXAPARIN SODIUM 30 MG: 30 INJECTION SUBCUTANEOUS at 21:23

## 2022-05-20 RX ADMIN — METOPROLOL TARTRATE 25 MG: 25 TABLET, FILM COATED ORAL at 08:23

## 2022-05-20 RX ADMIN — SENNOSIDES 8.6 MG: 8.6 TABLET, FILM COATED ORAL at 08:23

## 2022-05-20 RX ADMIN — TORSEMIDE 20 MG: 20 TABLET ORAL at 08:29

## 2022-05-20 NOTE — ASSESSMENT & PLAN NOTE
No results found for: HGBA1C    Recent Labs     05/19/22  1835   POCGLU 196*       Blood Sugar Average: Last 72 hrs:  (P) 196  SSI premeal fingerstick check  Avoid hypoglycemia  On steroids

## 2022-05-20 NOTE — UTILIZATION REVIEW
Initial Clinical Review    Admission: Date/Time/Statement:   Admission Orders (From admission, onward)     Ordered        05/19/22 1215  Place in Observation  Once                      Orders Placed This Encounter   Procedures    Place in Observation     Standing Status:   Standing     Number of Occurrences:   1     Order Specific Question:   Level of Care     Answer:   Med Surg [16]     ED Arrival Information     Expected   -    Arrival   5/19/2022 05:11    Acuity   Urgent            Means of arrival   Ambulance    Escorted by   [facility]    Service   Hospitalist    Admission type   [address] complaint   TRAUMA           Chief Complaint   Patient presents with   Charlyne Jaksch Fall       Initial Presentation: 80 y o  female PMH of hypothyroidism, HTN, DM, CKD stage 4, cva, AZUL, renal artery stenosis who presents to [place] ED via EMS with unwitnessed fall at home  With discussion with daughter, she was negative for covid at home with home covid tests  She is covid positive in ED  She also has positive leukocytes in urine, however, does not have nitrates  Pt was cleared by trauma for any spinal fractures or head trauma  Radiology states patient has possible displaced arthroplasty of left knee joint, she needs orthopedic consult  Admit Observation status dt fall: PT/OT eval, NPO, inc spir, accuchecks, IO and daily wts     5/20 Orthopedic Consult:  Pain controlled  No acute surgical intervention required at this time  WBAT LLE, PT/OT eval, pain control  Evon Bell for discharge from ortho perspective         ED Triage Vitals   Temperature Pulse Respirations Blood Pressure SpO2   05/19/22 0522 05/19/22 0522 05/19/22 0516 05/19/22 0522 05/19/22 0522   99 °F (37 2 °C) 84  (!) 214/80 95 %      Temp Source Heart Rate Source Patient Position - Orthostatic VS BP Location FiO2 (%)   05/19/22 0522 05/19/22 0522 05/19/22 0545 05/19/22 1349 --   Tympanic Monitor Lying Right arm       Pain Score 05/19/22 1349       5          Wt Readings from Last 1 Encounters:   05/19/22 71 2 kg (157 lb)     Additional Vital Signs:   Date/Time Temp Pulse Resp BP SpO2 O2 Device Patient Position - Orthostatic VS   05/19/22 2345 -- -- -- -- -- None (Room air) --   05/19/22 2222 99 2 °F (37 3 °C) 86 16 142/65 92 % -- Lying   05/19/22 1924 -- 80 20 172/72 Abnormal  97 % -- Lying   05/19/22 1900 98 4 °F (36 9 °C) 83 20 158/66 -- -- Lying   05/19/22 1733 -- -- -- -- 95 % None (Room air) --   05/19/22 1349 -- 89 16 159/67 97 % None (Room air) Lying   05/19/22 0600 -- 84 18 149/72 96 % None (Room air) Lying   05/19/22 0545 -- 80 18 149/65 96 % None (Room air) Lying   05/19/22 05:29:12 -- 81 18 149/91 95 % None (Room air) --   05/19/22 05:22:27 99 °F (37 2 °C) 84 18 214/80 Abnormal  95 % None (Room air) --       Pertinent Labs/Diagnostic Test Results:   XR knee 1 or 2 vw left   Final Result by Venus Garza MD (05/19 5216)      There is no acute fracture or dislocation  The femoral component of the total knee arthroplasty appears angulated with increased loss of joint space medially  Recommend orthopedic consultation  TRAUMA - CT head wo contrast   Final Result by Diego El MD (05/19 3212)      No acute intracranial abnormality  There is generalized atrophy  Advanced microangiopathic changes are present  Old bilateral lacunae are present  Paranasal sinus disease, as described above  Please see discussion  TRAUMA - CT spine cervical wo contrast   Final Result by Diego El MD (05/19 3340)      No acute cervical spine fracture or traumatic malalignment  There is old healed fracture deformity of the dens and upper body of C2  There has been prior posterior spinal fusion at C1-2  There is multilevel degenerative change of the spine  XR trauma multiple   Final Result by Maryan Denson MD (05/19 6728)      Right basilar pneumonia with small right pleural effusion     XR chest 1 view Final Result by Jose Shankar MD (05/19 1549)      Right basilar pneumonia with small right pleural effusion         Results from last 7 days   Lab Units 05/20/22 0603 05/19/22 1959 05/19/22  0526   WBC Thousand/uL 7 94 8 03  --    HEMOGLOBIN g/dL 9 9* 10 0*  --    I STAT HEMOGLOBIN g/dl  --   --  11 9   HEMATOCRIT % 31 4* 32 1*  --    HEMATOCRIT, ISTAT %  --   --  35   PLATELETS Thousands/uL 218 236  --    NEUTROS ABS Thousands/µL 6 43 6 90  --          Results from last 7 days   Lab Units 05/20/22 0603 05/19/22 1953 05/19/22  0526   SODIUM mmol/L 136 137  --    POTASSIUM mmol/L 4 1 4 1  --    CHLORIDE mmol/L 103 105  --    CO2 mmol/L 26 24  --    CO2, I-STAT mmol/L  --   --  30   ANION GAP mmol/L 7 8  --    BUN mg/dL 55* 48*  --    CREATININE mg/dL 1 92* 1 77*  --    EGFR ml/min/1 73sq m 22 25  --    CALCIUM mg/dL 8 9 8 8  --    CALCIUM, IONIZED, ISTAT mmol/L  --   --  1 14   MAGNESIUM mg/dL 2 6  --   --    PHOSPHORUS mg/dL 2 9  --   --      Results from last 7 days   Lab Units 05/20/22 0603 05/19/22 1953   AST U/L 26 26   ALT U/L 17 17   ALK PHOS U/L 39* 39*   TOTAL PROTEIN g/dL 6 8 6 8   ALBUMIN g/dL 2 7* 2 6*   TOTAL BILIRUBIN mg/dL 0 45 0 48     Results from last 7 days   Lab Units 05/19/22  1835   POC GLUCOSE mg/dl 196*     Results from last 7 days   Lab Units 05/20/22 0603 05/19/22 1953   GLUCOSE RANDOM mg/dL 146* 139     Results from last 7 days   Lab Units 05/19/22  0526   PH, KHALIF I-STAT  7 437*   PCO2, KHALIF ISTAT mm HG 42 7   PO2, KHALIF ISTAT mm HG 25 0*   HCO3, KHALIF ISTAT mmol/L 28 8   I STAT BASE EXC mmol/L 4*   I STAT O2 SAT % 47*     Results from last 7 days   Lab Units 05/19/22 2305 05/19/22 1953   HS TNI 0HR ng/L  --  181*   HS TNI 2HR ng/L 185*  --    HSTNI D2 ng/L 4  --    HS TNI 4HR ng/L 183*  --    HSTNI D4 ng/L 2  --      Results from last 7 days   Lab Units 05/19/22 1953   D-DIMER QUANTITATIVE ug/ml FEU 3 44*     Results from last 7 days   Lab Units 05/19/22 1953   PROCALCITONIN ng/ml 0 20     Results from last 7 days   Lab Units 05/19/22 1953   NT-PRO BNP pg/mL 23,264*     Results from last 7 days   Lab Units 05/20/22  0603 05/19/22 1953   CRP mg/L 121 0* 87 3*     Results from last 7 days   Lab Units 05/20/22  0605 05/19/22  0911   CLARITY UA  Clear Turbid   COLOR UA  Colorless Light Yellow   SPEC GRAV UA  1 010 1 016   PH UA  5 0 5 5   GLUCOSE UA mg/dl Negative Negative   KETONES UA mg/dl Negative Negative   BLOOD UA  Negative Moderate*   PROTEIN UA mg/dl Negative 30 (1+)*   NITRITE UA  Negative Negative   BILIRUBIN UA  Negative Negative   UROBILINOGEN UA (BE) mg/dl <2 0 <2 0   LEUKOCYTES UA  Negative Large*   WBC UA /hpf  --  Innumerable*   RBC UA /hpf  --  1-2   BACTERIA UA /hpf  --  None Seen   EPITHELIAL CELLS WET PREP /hpf  --  Occasional     ED Treatment:   Medication Administration from 05/19/2022 0510 to 05/19/2022 1459       Date/Time Order Dose Route Action     05/19/2022 0523 fentanyl citrate (PF) 100 MCG/2ML 50 mcg Intravenous Given     05/19/2022 0558 ondansetron (ZOFRAN) injection 4 mg 4 mg Intravenous Given        History reviewed  No pertinent past medical history  Present on Admission:   Fall   Hypothyroidism   DM type 2 (diabetes mellitus, type 2) ([facility] )   AZUL (obstructive sleep apnea)   Essential hypertension   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 (HCC)   Renal artery stenosis (HCC)   Lab test positive for detection of COVID-19 virus   UTI (urinary tract infection)      Admitting Diagnosis: Fall, initial encounter [W19  XXXA]  Unspecified multiple injuries, initial encounter [T07  XXXA]  COVID [U07 1]  Age/Sex: 80 y o  female  Admission Orders:  Scheduled Medications:  allopurinol, 150 mg, Oral, Daily  clopidogrel, 75 mg, Oral, Daily  dexamethasone, 6 mg, Intravenous, Q24H  docusate sodium, 100 mg, Oral, BID  enoxaparin, 30 mg, Subcutaneous, Daily  fenofibrate, 145 mg, Oral, Daily  gabapentin, 300 mg, Oral, TID  insulin lispro, 4-20 Units, Subcutaneous, TID AC  isosorbide mononitrate, 30 mg, Oral, Daily  levothyroxine, 112 mcg, Oral, Early Morning  lidocaine, 1 patch, Topical, Daily  methocarbamol, 750 mg, Intravenous, Once  metoprolol tartrate, 25 mg, Oral, Q12H KARINE  multivitamin with iron-minerals, 15 mL, Oral, Daily  remdesivir, 100 mg, Intravenous, Q24H  senna, 1 tablet, Oral, Daily  sertraline, 25 mg, Oral, Daily  torsemide, 20 mg, Oral, Daily  torsemide, 40 mg, Oral, Daily      Continuous IV Infusions: NONE     PRN Meds:  acetaminophen, 650 mg, Oral, Q6H PRN  albuterol, 2 puff, Inhalation, Q4H PRN  calcium carbonate, 1,000 mg, Oral, Daily PRN  HYDROcodone-acetaminophen, 1 tablet, Oral, Q6H PRN  methocarbamol, 750 mg, Oral, Q6H PRN  nitroglycerin, 0 4 mg, Sublingual, Q5 Min PRN  ondansetron, 4 mg, Intravenous, Q6H PRN        IP CONSULT TO ORTHOPEDIC SURGERY    Network Utilization Review Department  ATTENTION: Please call with any questions or concerns to [phone] and carefully listen to the prompts so that you are directed to the right person  All voicemails are confidential   Millie Bailon all requests for admission clinical reviews, approved or denied determinations and any other requests to dedicated fax number below belonging to the campus where the patient is receiving treatment   List of dedicated fax numbers for the Facilities:  [address] DENIALS (Administrative/Medical Necessity) [phone]   [address] (Maternity/NICU/Pediatrics) [phone]   [address]  [address] [address] [facility] [place] [address] [address] [place] [address] [phone] [address]al[address] [place] [address] [phone]

## 2022-05-20 NOTE — PROGRESS NOTES
[address]  Progress Note - Ivy Harris 1934, 80 y o  female MRN: [id number]  Unit/Bed#: [facility] 826-01 Encounter: [id number]  Primary Care Provider: Eileen Ramey DO   Date and time admitted to hospital: 5/19/2022  5:11 AM    UTI (urinary tract infection)  Assessment & Plan  - UA positive for leukocytes  - management per primary team, monitor off antibiotics for now    Lab test positive for detection of COVID-19 virus  Assessment & Plan  - tested positive on 5/19  - CXR also shows right basilar pneumonia with small pleural effusion  - currently on room air  - mild COVID pathway: Remdesivir  - management per primary team    Essential hypertension  Assessment & Plan  - management per primary team  - continue home medications    DM type 2 (diabetes mellitus, type 2) ([facility] )  Assessment & Plan  No results found for: HGBA1C    Recent Labs     05/19/22  1835   POCGLU 196*       Blood Sugar Average: Last 72 hrs:  (P) 196    Hypothyroidism  Assessment & Plan  - continue home levothyroixine    * Fall  Assessment & Plan  - s/p fall from standing  - CT head and c-spine negative for acute injuries  - xray knee shows assymetric wear on medial portion of arthroplasty, ortho following, no acute intervention needed, outpatient follow up recommended  - tertiary exam complete, no other injuries identified, trauma will sign off  - PT/OT      TERTIARY TRAUMA SURVEY NOTE    Prophylaxis: Enoxaparin (Lovenox)    Disposition: continue current level of care, workup per primary team, trauma will sign off    Code status:  Level 1 - Full Code    Consultants:   Orthopedics      SUBJECTIVE:     Transfer from: n/a  Mechanism of Injury:Fall    Chief Complaint: None    HPI/Last 24 hour events:   Per Dr Mary Jane Leiva HPI "80 y o  female who presents following an unwitnessed fall at home  Patient states she does not remember the event but does not think that she felt lightheaded or dizzy prior to falling    She thinks she may have just lost her balance  She denies hitting her head or losing consciousness  She is currently complaining of cervical neck pain and left knee pain  She takes Plavix for previous aortic valve replacement "    Patient states she is feeling well this morning, denies any complaints  Denies chest pain or shortness of breath  Denies headaches, back pain, neck pain, abdominal pain, nausea or vomiting  She states she was having left knee pain but this is improved  Denies any new extremity pain       Active medications:           Current Facility-Administered Medications:     acetaminophen (TYLENOL) tablet 650 mg, 650 mg, Oral, Q6H PRN    albuterol (PROVENTIL HFA,VENTOLIN HFA) inhaler 2 puff, 2 puff, Inhalation, Q4H PRN    allopurinol (ZYLOPRIM) tablet 150 mg, 150 mg, Oral, Daily, 150 mg at 05/19/22 1741    calcium carbonate (TUMS) chewable tablet 1,000 mg, 1,000 mg, Oral, Daily PRN    clopidogrel (PLAVIX) tablet 75 mg, 75 mg, Oral, Daily, 75 mg at 05/19/22 1741    dexamethasone (DECADRON) injection 6 mg, 6 mg, Intravenous, Q24H, 6 mg at 05/19/22 1742    docusate sodium (COLACE) capsule 100 mg, 100 mg, Oral, BID, 100 mg at 05/19/22 1741    enoxaparin (LOVENOX) subcutaneous injection 30 mg, 30 mg, Subcutaneous, Daily, 30 mg at 05/19/22 2153    fenofibrate (TRICOR) tablet 145 mg, 145 mg, Oral, Daily, 145 mg at 05/19/22 1741    gabapentin (NEURONTIN) capsule 300 mg, 300 mg, Oral, TID, 300 mg at 05/19/22 2345    HYDROcodone-acetaminophen (NORCO) 5-325 mg per tablet 1 tablet, 1 tablet, Oral, Q6H PRN, 1 tablet at 05/19/22 2345    insulin lispro (HumaLOG) 100 units/mL subcutaneous injection 4-20 Units, 4-20 Units, Subcutaneous, TID AC, 4 Units at 05/19/22 5315 **AND** Fingerstick Glucose (POCT), , , TID AC    isosorbide mononitrate (IMDUR) 24 hr tablet 30 mg, 30 mg, Oral, Daily, 30 mg at 05/19/22 1741    levothyroxine tablet 112 mcg, 112 mcg, Oral, Early Morning, 112 mcg at 05/20/22 0604   lidocaine (LIDODERM) 5 % patch 1 patch, 1 patch, Topical, Daily, 1 patch at 05/19/22 1800    methocarbamol (ROBAXIN) injection 750 mg, 750 mg, Intravenous, Once    methocarbamol (ROBAXIN) tablet 750 mg, 750 mg, Oral, Q6H PRN, 750 mg at 05/19/22 1741    metoprolol tartrate (LOPRESSOR) tablet 25 mg, 25 mg, Oral, Q12H KARINE, 25 mg at 05/19/22 2153    multivitamin with iron-minerals liquid 15 mL, 15 mL, Oral, Daily, 15 mL at 05/19/22 1800    nitroglycerin (NITROSTAT) SL tablet 0 4 mg, 0 4 mg, Sublingual, Q5 Min PRN    ondansetron (ZOFRAN) injection 4 mg, 4 mg, Intravenous, Q6H PRN    [COMPLETED] remdesivir (Veklury) 200 mg in sodium chloride 0 9 % 290 mL IVPB, 200 mg, Intravenous, Q24H, 200 mg at 05/19/22 1856 **FOLLOWED BY** remdesivir (Veklury) 100 mg in sodium chloride 0 9 % 270 mL IVPB, 100 mg, Intravenous, Q24H    senna (SENOKOT) tablet 8 6 mg, 1 tablet, Oral, Daily, 8 6 mg at 05/19/22 1800    sertraline (ZOLOFT) tablet 25 mg, 25 mg, Oral, Daily, 25 mg at 05/19/22 1741    torsemide (DEMADEX) tablet 20 mg, 20 mg, Oral, Daily, 20 mg at 05/19/22 1741    torsemide (DEMADEX) tablet 40 mg, 40 mg, Oral, Daily      OBJECTIVE:     Vitals:   Vitals:    05/19/22 2222   BP: 142/65   Pulse: 86   Resp: 16   Temp: 99 2 °F (37 3 °C)   SpO2: 92%       Physical Exam:   GENERAL APPEARANCE: No acute distress  NEURO: Awake and alert, no focal deficits  HEENT: Normocephalic, atraumatic  CV: Regular rate and rhythm, no murmurs, rubs or gallops  LUNGS: Clear to auscultation bilaterally  GI: abd soft, non-distended, non-tender  : deferred  MSK: no edema, mild tenderness to the left knee, no swelling, redness or deformity  SKIN: warm and dry, no bruising      1  Before the illness or injury that brought you to the Emergency, did you need someone to help you on a regular basis? 0=No   2  Since the illness or injury that brought you to the Emergency, have you needed more help than usual to take care of yourself? 1=Yes   3   Have you been hospitalized for one or more nights during the past 6 months (excluding a stay in the Emergency Department)? 0=No   4  In general, do you see well? 0=Yes   5  In general, do you have serious problems with your memory? 0=No   6  Do you take more than three different medications everyday? 1=Yes   TOTAL   2     Did you order a geriatric consult if the score was 2 or greater?: n/a                I/O:   I/O       05/18 0701 05/19 0700 05/19 0701 05/20 0700 05/20 0701 05/21 0700    P  O   480     Total Intake(mL/kg)  480 (6 7)     Urine (mL/kg/hr)  1 (0)     Total Output  1     Net  +479                  Invasive Devices: Invasive Devices  Report    Peripheral Intravenous Line  Duration           Peripheral IV 05/19/22 Dorsal (posterior); Left Forearm 1 day          Drain  Duration           External Urinary Catheter <1 day                  Imaging:   XR knee 1 or 2 vw left    Result Date: 5/19/2022  Impression: There is no acute fracture or dislocation  The femoral component of the total knee arthroplasty appears angulated with increased loss of joint space medially  Recommend orthopedic consultation  The study was marked in [facility] for immediate notification  Workstation performed: [id number]     TRAUMA - CT head wo contrast    Result Date: 5/19/2022  Impression: No acute intracranial abnormality  There is generalized atrophy  Advanced microangiopathic changes are present  Old bilateral lacunae are present  Paranasal sinus disease, as described above  Please see discussion  I personally discussed this study with Kulwinder Bolton on 5/19/2022 at 6:05 AM  Workstation performed: [id number]     TRAUMA - CT spine cervical wo contrast    Result Date: 5/19/2022  Impression: No acute cervical spine fracture or traumatic malalignment  There is old healed fracture deformity of the dens and upper body of C2  There has been prior posterior spinal fusion at C1-2  There is multilevel degenerative change of the spine  I personally discussed this study with Tiffany Crawley on 5/19/2022 at 5:49 AM   Workstation performed: [id number]     XR chest 1 view    Result Date: 5/19/2022  Impression: Right basilar pneumonia with small right pleural effusion  The study was marked in [facility] for immediate notification  Workstation performed: [id number]     XR trauma multiple    Result Date: 5/19/2022  Impression: Right basilar pneumonia with small right pleural effusion  The study was marked in [facility] for immediate notification    Workstation performed: [id number]       Labs:   CBC:   Lab Results   Component Value Date    WBC 7 94 05/20/2022    HGB 9 9 (L) 05/20/2022    HCT 31 4 (L) 05/20/2022     (H) 05/20/2022     05/20/2022    MCH 33 6 05/20/2022    MCHC 31 5 05/20/2022    RDW 15 0 05/20/2022    MPV 9 2 05/20/2022    NRBC 0 05/20/2022     CMP:   Lab Results   Component Value Date     05/20/2022    CO2 26 05/20/2022    BUN 55 (H) 05/20/2022    CREATININE 1 92 (H) 05/20/2022    CALCIUM 8 9 05/20/2022    AST 26 05/20/2022    ALT 17 05/20/2022    ALKPHOS 39 (L) 05/20/2022    EGFR 22 05/20/2022

## 2022-05-20 NOTE — CASE MANAGEMENT
Case Management Assessment & Discharge Planning Note    Patient name Pako Wood  [facility] [address] [facility] [address] [facility] [id number]  : 1934 Date 2022       Current Admission Date: 2022  Current Admission Diagnosis:Fall   Patient Active Problem List    Diagnosis Date Noted    Fall 2022    Hypothyroidism 2022    DM type 2 (diabetes mellitus, type 2) ([facility] ) 2022    AZUL (obstructive sleep apnea) 2022    Essential hypertension 2022    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 ([facility] ) 2022    Renal artery stenosis ([facility] ) 2022    Lab test positive for detection of COVID-19 virus 2022    UTI (urinary tract infection) 2022      LOS (days): 0  Geometric Mean LOS (GMLOS) (days):   Days to GMLOS:     OBJECTIVE:              Current admission status: Observation       Preferred Pharmacy:   [facility] [address], [address] [facility]  [address] [place] [address]  Phone: [phone] Fax: [phone]    Primary Care Provider: Barney Carter DO    Primary Insurance: [facility]  Secondary Insurance:     ASSESSMENT:  Ifeoma Schofield Proxies    There are no active Health Care Proxies on file  Patient Information  Admitted from[de-identified] Home  Mental Status: Alert  During Assessment patient was accompanied by: Not accompanied during assessment  Assessment information provided by[de-identified] Daughter  Primary Caregiver: Family  Support Systems: Daughter, Family members  Home entry access options   Select all that apply : Ramp (unsteady ramp in back, 4 jimmie in front)  Type of Current Residence: 2 story home  Upon entering residence, is there a bedroom on the main floor (no further steps)?: Yes  Upon entering residence, is there a bathroom on the main floor (no further steps)?: Yes  Living Arrangements: Lives w/ Daughter    Activities of Daily Living Prior to Admission  Functional Status: Assistance  Completes ADLs independently?: No  Level of ADL dependence: Assistance  Ambulates independently?: Yes  Does patient use assisted devices?: Yes  Assisted Devices (DME) used: Mayr Bowen  Does patient currently own DME?: Yes  What DME does the patient currently own?: Mary Bowen  Does patient have a history of Outpatient Therapy (PT/OT)?: No  Does the patient have a history of Short-Term Rehab?: Yes ([facility] & [facility] acute)  Does patient have a history of HHC?: Yes William Cote)  Does patient currently have [address]?: Yes    Current Home Health Care  Type of Current Home Care Services: Home PT, Home OT  [address][de-identified] [facility] Provider[de-identified] PCP    Patient Information Continued  Income Source: SSI/SSD  Does patient have prescription coverage?: Yes  Does patient receive dialysis treatments?: No  Does patient have a history of substance abuse?: No  Does patient have a history of Mental Health Diagnosis?: No         Means of Transportation  Means of Transport to Hospitals in [place][de-identified] Family transport        DISCHARGE DETAILS:    Discharge planning discussed with[de-identified] pt's daughter Joe Turner of Choice: Yes     CM contacted family/caregiver?: Yes  Were Treatment Team discharge recommendations reviewed with patient/caregiver?: Yes  Did patient/caregiver verbalize understanding of patient care needs?: Yes  Were patient/caregiver advised of the risks associated with not following Treatment Team discharge recommendations?: Yes    Contacts  Patient Contacts: Harjinder Rodriguez daughter  Relationship to Patient[de-identified] Family  Contact Method: Phone  Phone Number: [phone]  Reason/Outcome: Emergency Contact, Discharge [address]         Is the patient interested in [address] at discharge?: Yes  [address] requested[de-identified] Occupational Therapy, Physical [address] Name[de-identified] Other  [address] Provider[de-identified] PCP  Home Health Services Needed[de-identified] Evaluate Functional Status and Safety, Gait/ADL Training, Strengthening/Theraputic Exercises to Improve Function  Homebound Criteria Met[de-identified] Uses an Assist Device (i e  cane, walker, etc)  Supporting Clincal Findings[de-identified] Limited Endurance         Other Referral/Resources/Interventions Provided:  Referral Comments: S/w dtr Alexa Casarez about therapy recommendations for 24/7 supervision vs rehab  She states pt lives with her, her  & has 3 children whom help  Alexa Casarez works out of house 3 days a week & has private caregivers  Has close to 24/7 care  Refuses rehab at this time & wants to resume [facility] for PT/OT only  Referral sent  CM to follow up

## 2022-05-20 NOTE — ASSESSMENT & PLAN NOTE
- s/p fall from standing  - CT head and c-spine negative for acute injuries  - xray knee shows assymetric wear on medial portion of arthroplasty, ortho following, no acute intervention needed, outpatient follow up recommended  - tertiary exam complete, no other injuries identified, trauma will sign off  - PT/OT

## 2022-05-20 NOTE — PROGRESS NOTES
[facility]  Progress Note - Steff Ojeda 1934, 80 y o  female MRN: [id number]  Unit/Bed#: [facility] 826-01 Encounter: [id number]  Primary Care Provider: Carri Etienne,    Date and time admitted to hospital: 5/19/2022  5:11 AM    UTI (urinary tract infection)  Assessment & Plan  Urine is positive for leukocytes  Will monitor off antibiotics for now  If has fever, wbc count elevation would start antibiotics  Lab test positive for detection of COVID-19 virus  Assessment & Plan  Patient was positive for covid on Thursday  Start treatment as patient has diabetes  Mild covid pathway   Labs obtained  Start remdesivir; if patient continues to be off oxygen, consider stopping tomorrow  Will stop dexamethasone as patient currently on room air and saturating well        Renal artery stenosis (HCC)  Assessment & Plan  Monitor blood pressure closely     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 ([facility] )  Assessment & Plan  · Baseline creatinine appears to be approximately 2 0; at baseline now  · Avoid nephrotoxic medication  · Avoid hypotension   · Monitor in and out      Essential hypertension  Assessment & Plan  Continue with metoprolol 25 mg bid  Demodex 50 mg am and 20 mg afternoon    AZUL (obstructive sleep apnea)  Assessment & Plan  Monitor closely might need CPAP at night      DM type 2 (diabetes mellitus, type 2) ([facility] )  Assessment & Plan  No results found for: HGBA1C    Recent Labs     05/19/22  1835   POCGLU 196*       Blood Sugar Average: Last 72 hrs:  (P) 196  SSI premeal fingerstick check  Avoid hypoglycemia  On steroids  Hypothyroidism  Assessment & Plan  Continue levothyroxine 112 mg daily     * Fall  Assessment & Plan  · Patient was unwitnessed fall at home  · Work up in ED no spine and brain injury  · Radiology contacted regarding knee arthoplasty is displaced     · Orthopedics consulted - no surgical needs at this time  · Fall precautions; PT/OT      VTE Pharmacologic Prophylaxis:   Pharmacologic: Enoxaparin (Lovenox)  Mechanical VTE Prophylaxis in Place: Yes    Patient Centered Rounds: I have performed bedside rounds with nursing staff today  Discussions with Specialists or Other Care Team Provider:     Education and Discussions with Family / Patient: Care plan discussed with patient who voiced understanding and agrees with recommendations  Time Spent for Care: 45 minutes  More than 50% of total time spent on counseling and coordination of care as described above  Current Length of Stay: 0 day(s)    Current Patient Status: Observation   Certification Statement: The patient will continue to require additional inpatient hospital stay due to Evaluate causes of fall and treatment of COVID    Discharge Plan: To be determined, likely next 24-48 hours    Code Status: Level 1 - Full Code      Subjective:   Patient seen and examined bedside, no acute distress or discomfort noted  Patient is a very pleasant 80year-old who came in for falls  Patient described fall as being around the time she was micturating; most likely vasovagal versus orthostatic  Patient reports she has frequent bouts of dizziness even while lying down; will continue workup  Dexamethasone held as patient is currently 97% on room air  Will likely discontinue remdesivir tomorrow  Order orthostatics; consider trial of meclizine moving forward  Fluid resuscitation; may benefit from compression stockings as well  Objective:     Vitals:   Temp (24hrs), Av 8 °F (37 1 °C), Min:98 4 °F (36 9 °C), Max:99 2 °F (37 3 °C)    Temp:  [98 4 °F (36 9 °C)-99 2 °F (37 3 °C)] 99 2 °F (37 3 °C)  HR:  [80-86] 86  Resp:  [16-20] 16  BP: (142-172)/(65-72) 142/65  SpO2:  [92 %-97 %] 92 %  Body mass index is 31 71 kg/m²  Input and Output Summary (last 24 hours):        Intake/Output Summary (Last 24 hours) at 2022 1649  Last data filed at 2022 1200  Gross per 24 hour   Intake 960 ml   Output 401 ml   Net 559 ml       Physical Exam:     Physical Exam  Vitals and nursing note reviewed  Constitutional:       General: She is not in acute distress  Appearance: She is well-developed  HENT:      Head: Normocephalic and atraumatic  Eyes:      Conjunctiva/sclera: Conjunctivae normal    Cardiovascular:      Rate and Rhythm: Normal rate  Heart sounds: No murmur heard  Pulmonary:      Effort: Pulmonary effort is normal  No respiratory distress  Abdominal:      Palpations: Abdomen is soft  Tenderness: There is no abdominal tenderness  Musculoskeletal:      Cervical back: Neck supple  Skin:     General: Skin is warm and dry  Neurological:      Mental Status: She is alert  Psychiatric:         Behavior: Behavior normal            Additional Data:     Labs:    Results from last 7 days   Lab Units 05/20/22  0603   WBC Thousand/uL 7 94   HEMOGLOBIN g/dL 9 9*   HEMATOCRIT % 31 4*   PLATELETS Thousands/uL 218   NEUTROS PCT % 81*   LYMPHS PCT % 16   MONOS PCT % 3*   EOS PCT % 0     Results from last 7 days   Lab Units 05/20/22  0603   SODIUM mmol/L 136   POTASSIUM mmol/L 4 1   CHLORIDE mmol/L 103   CO2 mmol/L 26   BUN mg/dL 55*   CREATININE mg/dL 1 92*   ANION GAP mmol/L 7   CALCIUM mg/dL 8 9   ALBUMIN g/dL 2 7*   TOTAL BILIRUBIN mg/dL 0 45   ALK PHOS U/L 39*   ALT U/L 17   AST U/L 26   GLUCOSE RANDOM mg/dL 146*         Results from last 7 days   Lab Units 05/19/22  1835   POC GLUCOSE mg/dl 196*         Results from last 7 days   Lab Units 05/19/22  1953   PROCALCITONIN ng/ml 0 20           * I Have Reviewed All Lab Data Listed Above  * Additional Pertinent Lab Tests Reviewed:  All Labs Within Last 24 Hours Reviewed    Imaging:    Imaging Reports Reviewed Today Include:  CT head, CT cervical spine  Imaging Personally Reviewed by Myself Includes:      Recent Cultures (last 7 days):           Last 24 Hours Medication List:   Current Facility-Administered Medications   Medication Dose Route Frequency Provider Last Rate    acetaminophen  650 mg Oral Q6H PRN Johny Baca MD      albuterol  2 puff Inhalation Q4H PRN Johny Baca MD      allopurinol  150 mg Oral Daily Johny Baca MD      calcium carbonate  1,000 mg Oral Daily PRN Johny Baca MD      clopidogrel  75 mg Oral Daily Johny Baca MD      docusate sodium  100 mg Oral BID Johny Baca MD      enoxaparin  30 mg Subcutaneous Daily Johny Baca MD      fenofibrate  145 mg Oral Daily Johny Baca MD      gabapentin  300 mg Oral TID Johny Baca MD      HYDROcodone-acetaminophen  1 tablet Oral Q6H PRN Johny Baca MD      insulin lispro  4-20 Units Subcutaneous TID AC Johny Baca MD      isosorbide mononitrate  30 mg Oral Daily Johny Baca MD      levothyroxine  112 mcg Oral Early Morning Johny Baca MD      lidocaine  1 patch Topical Daily Johny Baca MD      methocarbamol  750 mg Intravenous Once Johny Baca MD      methocarbamol  750 mg Oral Q6H PRN Johny Baca MD      metoprolol tartrate  25 mg Oral Q12H [address] Johny Baca MD      multivitamin with iron-minerals  15 mL Oral Daily Johny Baca MD      nitroglycerin  0 4 mg Sublingual Q5 Min PRN Johny Baca MD      ondansetron  4 mg Intravenous Q6H PRN Johny Baca MD      remdesivir  100 mg Intravenous Q24H Johny Baca MD      senna  1 tablet Oral Daily Johny Baca MD      sertraline  25 mg Oral Daily Johny Baca MD      torsemide  20 mg Oral Daily Johny Baca MD      torsemide  40 mg Oral Daily Johny Baca MD          Today, Patient Was Seen By: Dajuan Vivas MD    ** Please Note: Dictation voice to text software may have been used in the creation of this document   **

## 2022-05-20 NOTE — ASSESSMENT & PLAN NOTE
· Patient was unwitnessed fall at home  · Work up in ED no spine and brain injury  · Radiology contacted regarding knee arthoplasty is displaced     · Orthopedics consulted - no surgical needs at this time  · Fall precautions; PT/OT

## 2022-05-20 NOTE — ASSESSMENT & PLAN NOTE
No results found for: HGBA1C    Recent Labs     05/19/22  1835   POCGLU 196*       Blood Sugar Average: Last 72 hrs:  (P) 196

## 2022-05-20 NOTE — ASSESSMENT & PLAN NOTE
Patient was positive for covid on Thursday     Start treatment as patient has diabetes  Mild covid pathway   Labs obtained  Start remdesivir; if patient continues to be off oxygen, consider stopping tomorrow  Will stop dexamethasone as patient currently on room air and saturating well Received pt in ED bed 18. Pt c/o sore throat and ear pain (right) that began this morning. States she \"cannot get water down\".

## 2022-05-20 NOTE — PHYSICAL THERAPY NOTE
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 ([facility] )    Renal artery stenosis (HCC)    Lab test positive for detection of COVID-19 virus    UTI (urinary tract infection)       Past Medical History  History reviewed  No pertinent past medical history  Past Surgical History  History reviewed  No pertinent surgical history  05/20/22 1135   PT Last Visit   PT Visit Date 05/20/22   Note Type   Note type Evaluation  (+ treatment)   Pain Assessment   Pain Assessment Tool FLACC   Pain Location/Orientation Location: Groin   Pain Rating: FLACC (Rest) - Face 0   Pain Rating: FLACC (Rest) - Legs 0   Pain Rating: FLACC (Rest) - Activity 0   Pain Rating: FLACC (Rest) - Cry 0   Pain Rating: FLACC (Rest) - Consolability 0   Score: FLACC (Rest) 0   Pain Rating: FLACC (Activity) - Face 1   Pain Rating: FLACC (Activity) - Legs 0   Pain Rating: FLACC (Activity) - Activity 0   Pain Rating: FLACC (Activity) - Cry 1   Pain Rating: FLACC (Activity) - Consolability 1   Score: FLACC (Activity) 3   Restrictions/Precautions   Weight Bearing Precautions Per Order No   Other Precautions Airborne/isolation; Chair Alarm; Bed Alarm;Telemetry; Fall Risk  (COVID-19)   Home Living   Type of Home House  (MIL suite)   Home Layout Ramped entrance  (ramp vs 3 ISRA; 1 step down to the bathroom)   Bathroom Shower/Tub Walk-in shower   Bathroom Toilet Raised   Bathroom Equipment Grab bars in shower; Shower chair;Commode   Home Equipment Walker;Grab bars  (pt's RW is a pediatric RW; pt sleeps in lift chair; grab bars next to 1 step down to bathroom; pt reports her LEXA installed grabbars "all around the house")   Additional Comments Pt reports being alone for max of 2-3 hours at a time  When pt is alone she uses BSC, dtr does not feel comfortable having her negotiate the 1 step to her bathroom  Prior Function   Lives With Family  (dtr, LEXA, 3 grandchildren)   Receives Help From Family;Home health;Personal care attendant  (receives help from paid caregivers 3 days/week for 3-4 hours; caregivers supervise pt during bathing + assist w/ some dressing (socks + shoes); pt reports that she is currently receiving home PT + OT from [facility])   ADL Assistance Needs assistance   IADLs Needs assistance   Falls in the last 6 months 1 to 4  (1 leading to this admission)   Vocational Retired   Comments PTA pt reports being Liliana w/ ambulation in the house w/ RW, makes her own meals  Pt reports having near-24/7 support  General   Family/Caregiver Present No   Cognition   Arousal/Participation Alert   Orientation Level Oriented X4   Memory Within functional limits   Following Commands Follows one step commands without difficulty   Comments pleasant + cooperative, a bit tangential at times, cues for safety required   Subjective   Subjective Pt agreeable to mobilize  RLE Assessment   RLE Assessment   (grossly 3+/5)   LLE Assessment   LLE Assessment   (grossly 3+/5)   Coordination   Movements are Fluid and Coordinated 1   Bed Mobility   Supine to Sit 3  Moderate assistance   Additional items Assist x 1; Increased time required;Verbal cues   Sit to Supine Unable to assess   Additional Comments Pt greeted in supine  Pt does not perform bed mobility at home as she sleeps in a lift recliner  Transfers   Sit to Stand 4  Minimal assistance   Additional items Assist x 1; Increased time required;Verbal cues   Stand to Sit 4  Minimal assistance   Additional items Assist x 1; Increased time required;Verbal cues   Stand pivot 4  Minimal assistance   Additional items Assist x 1; Increased time required;Verbal cues Toilet transfer 3  Moderate assistance   Additional items Assist x 1; Increased time required;Verbal cues;Standard toilet  (grab bar, +BM)   Additional Comments RW   Ambulation/Elevation   Gait pattern Excessively slow; Short stride; Foward flexed; Shuffling;Decreased foot clearance; Improper Weight shift   Gait Assistance 4  Minimal assist   Additional items Assist x 1;Verbal cues; Tactile cues   Assistive Device Rolling walker   Distance 10'x2   Balance   Static Sitting Fair +   Dynamic Sitting Fair   Static Standing Fair -   Dynamic Standing Poor +   Ambulatory Poor +  (RW)   Endurance Deficit   Endurance Deficit Yes   Endurance Deficit Description weakness, fatigue, SpO2 91% and above RA (denied SOB)   Activity Tolerance   Activity Tolerance Patient limited by fatigue   Medical Staff Made Aware Sourav Cooper [facility]   Nurse Made Aware yes - cleared pt for PT   Assessment   Prognosis Good   Problem List Decreased strength;Decreased endurance; Impaired balance;Decreased mobility; Decreased safety awareness   Assessment Pt is 80 y o  female seen for a high complexity PT evaluation s/p admit to [facility] on 5/19/2022  Pt presenting s/p fall due to LOB w/ mild L knee pain; no traumatic injuries  Pt is positive for COVID-19  Please see above for other active problem list / PMH  PT now consulted to assess functional mobility and needs for safe d/c planning  Prior to admission, pt was ambulatory w/ RW, lives w/ her family in a house w/ ramp access, 1 step down to bathroom  Currently pt requires MinAx1-ModAx1 for functional transfers; MinAx1 for ambulation w/ RW  Pt presents functioning below baseline and w/ overall mobility deficits 2* to: decreased LE strength; generalized weakness/deconditioning; decreased endurance; impaired balance; gait deviations; fatigue; impaired safety and judgement; limited insight into current deficits; bed/chair alarms  Pt currently at risk for falls   (Please find additional objective findings from PT assessment regarding body systems outlined above ) Pt will continue to benefit from skilled PT interventions to address stated impairments; to maximize functional potential; for ongoing pt/ family training; and DME needs  PT is currently recommending rehab vs home w/ home PT w/ 24/7 support  Barriers to Discharge Inaccessible home environment;Decreased caregiver support   Goals   Patient Goals go home   STG Expiration Date 06/03/22   Short Term Goal #1 In 14 days pt will complete: 1) Functional transfers with Liliana to facilitate safe return to previous living environment  2) Ambulation with ' w/ Liliana without LOB for safe ambulation in home/community environment  3) Improve balance scores by 1 grade to decrease fall risk  4) Improve LE strength grades by 1 to increase independence w/ all functional mobility, transfers and gait  5) PT for ongoing pt and family education; DME needs and D/C planning to promote highest level of function in least restrictive environment  6) Stair training up/ down 1 step with 2 rails and CS for safe access to previous living environment and to increase community access  PT Treatment Day 0   Plan   Treatment/Interventions Functional transfer training;LE strengthening/ROM; Elevations; Therapeutic exercise; Endurance training;Patient/family training;Equipment eval/education;Gait training; Compensatory technique education;Spoke to nursing;OT;Spoke to case management   PT Frequency 3-5x/wk   Recommendation   PT Discharge Recommendation   (rehab vs HHPT w/ 24/7 support)   Equipment Recommended   (pt already owns RW)   AM-PAC Basic Mobility Inpatient   Turning in Bed Without Bedrails 2   Lying on Back to Sitting on Edge of Flat Bed 2   Moving Bed to Chair 3   Standing Up From Chair 3   Walk in Room 3   Climb 3-5 Stairs 2   Basic Mobility Inpatient Raw Score 15   Basic Mobility Standardized Score 36 97   Highest Level Of Mobility   -[facility] Goal 4: Move to chair/commode SMITH Achieved 6: Walk 10 steps or more   Additional Treatment Session   Start Time 1120   End Time 1135   Treatment Assessment Gait training an additional 20' w/ RW w/ MinAx1, cues to maintain CoM within Tiffani + for increased foot clearance during swing  End of Consult   Patient Position at End of Consult Bedside chair;Bed/Chair alarm activated; All needs within reach  (on waffle cushion, BLE elevated, all lines in tact)     Yuly Osullivan, PT, DPT

## 2022-05-20 NOTE — PROGRESS NOTES
Progress Note - Orthopedics   Claudetta Busman 80 y o  female MRN: [id number]  [facility]      Subjective:    80 y  o female  No acute events, no complaints  Pt doing well  Pain controlled  Denies fevers chills, CP, SOB   Reports posterior left and right knee pain which has been ongoing for years     Labs:  0   Lab Value Date/Time    HCT 31 4 (L) 05/20/2022 0603    HCT 32 1 (L) 05/19/2022 1959    HCT 35 05/19/2022 0526    HGB 9 9 (L) 05/20/2022 0603    HGB 10 0 (L) 05/19/2022 1959    HGB 11 9 05/19/2022 0526    WBC 7 94 05/20/2022 0603    WBC 8 03 05/19/2022 1959    CRP 87 3 (H) 05/19/2022 1953       Meds:    Current Facility-Administered Medications:     acetaminophen (TYLENOL) tablet 650 mg, 650 mg, Oral, Q6H PRN, Chelita Ely MD    albuterol (PROVENTIL HFA,VENTOLIN HFA) inhaler 2 puff, 2 puff, Inhalation, Q4H PRN, Chelita Ely MD    allopurinol (ZYLOPRIM) tablet 150 mg, 150 mg, Oral, Daily, Chelita Ely MD, 150 mg at 05/19/22 1741    calcium carbonate (TUMS) chewable tablet 1,000 mg, 1,000 mg, Oral, Daily PRN, Chelita Ely MD    clopidogrel (PLAVIX) tablet 75 mg, 75 mg, Oral, Daily, Chelita Ely MD, 75 mg at 05/19/22 1741    dexamethasone (DECADRON) injection 6 mg, 6 mg, Intravenous, Q24H, Chelita Ely MD, 6 mg at 05/19/22 1742    docusate sodium (COLACE) capsule 100 mg, 100 mg, Oral, BID, Chelita Ely MD, 100 mg at 05/19/22 1741    enoxaparin (LOVENOX) subcutaneous injection 30 mg, 30 mg, Subcutaneous, Daily, Chelita Ely MD, 30 mg at 05/19/22 2153    fenofibrate (TRICOR) tablet 145 mg, 145 mg, Oral, Daily, Chelita Ely MD, 145 mg at 05/19/22 1741    gabapentin (NEURONTIN) capsule 300 mg, 300 mg, Oral, TID, Chelita Ely MD, 300 mg at 05/19/22 2345    HYDROcodone-acetaminophen (NORCO) 5-325 mg per tablet 1 tablet, 1 tablet, Oral, Q6H PRN, Chelita Ely MD, 1 tablet at 05/19/22 5836    insulin lispro (HumaLOG) 100 units/mL subcutaneous injection 4-20 Units, 4-20 Units, Subcutaneous, TID AC, 4 Units at 05/19/22 1835 **AND** Fingerstick Glucose (POCT), , , TID AC, Phani Nelson MD    isosorbide mononitrate (IMDUR) 24 hr tablet 30 mg, 30 mg, Oral, Daily, Phani Nelson MD, 30 mg at 05/19/22 1741    levothyroxine tablet 112 mcg, 112 mcg, Oral, Early Morning, Phani Nelson MD, 112 mcg at 05/20/22 0604    lidocaine (LIDODERM) 5 % patch 1 patch, 1 patch, Topical, Daily, Phani Nelson MD, 1 patch at 05/19/22 1800    methocarbamol (ROBAXIN) injection 750 mg, 750 mg, Intravenous, Once, Phani Nelson MD    methocarbamol (ROBAXIN) tablet 750 mg, 750 mg, Oral, Q6H PRN, Phani Nelson MD, 750 mg at 05/19/22 1741    metoprolol tartrate (LOPRESSOR) tablet 25 mg, 25 mg, Oral, Q12H [address], Phani Nelson MD, 25 mg at 05/19/22 2153    multivitamin with iron-minerals liquid 15 mL, 15 mL, Oral, Daily, Phani Nelson MD, 15 mL at 05/19/22 1800    nitroglycerin (NITROSTAT) SL tablet 0 4 mg, 0 4 mg, Sublingual, Q5 Min PRN, Phani Nelson MD    ondansetron (ZOFRAN) injection 4 mg, 4 mg, Intravenous, Q6H PRN, Phani Nelson MD    [COMPLETED] remdesivir Neydabernard Nunn) 200 mg in sodium chloride 0 9 % 290 mL IVPB, 200 mg, Intravenous, Q24H, 200 mg at 05/19/22 1856 **FOLLOWED BY** remdesivir (Veklury) 100 mg in sodium chloride 0 9 % 270 mL IVPB, 100 mg, Intravenous, Q24H, Phani Nelson MD    senna (SENOKOT) tablet 8 6 mg, 1 tablet, Oral, Daily, Phani Nelson MD, 8 6 mg at 05/19/22 1800    sertraline (ZOLOFT) tablet 25 mg, 25 mg, Oral, Daily, Phani Nelson MD, 25 mg at 05/19/22 1741    torsemide (DEMADEX) tablet 20 mg, 20 mg, Oral, Daily, Phani Nelson MD, 20 mg at 05/19/22 1741    torsemide (DEMADEX) tablet 40 mg, 40 mg, Oral, Daily, Phani Nelson MD    Blood Culture:   No results found for: BLOODCX    Wound Culture:   No results found for: WOUNDCULT    Ins and Outs:  I/O last 24 hours:   In: 480 [P O :480]  Out: 1 [Urine:1]          Physical:  Vitals:    05/19/22 2222   BP: 142/65   Pulse: 86   Resp: 16   Temp: 99 2 °F (37 3 °C)   SpO2: 92%     Musculoskeletal: left Lower Extremity  · Skin intact without erythema or swelling   · TTP about posterior knee   · SILT s/s/sp/dp/t  +fhl/ehl, +ankle dorsi/plantar flexion  2+ DP pulse  · Knee exam unchanged from yesterday  Stable to valgus and varus stress     Assessment:    80 y  o female with history of left TKA 30 years ago status post ground level fall with asymmetric polywear of the medial side  No acute surgical intervention required at this time     Plan:  · WB AT LLE   · PT/OT  · Pain control  · DVT ppx  · Dispo: [id number] Romina Garcia for discharge from ortho perspective   Patient may follow up with Surgeon of record      Iveth Cortez MD

## 2022-05-20 NOTE — ASSESSMENT & PLAN NOTE
- tested positive on 5/19  - CXR also shows right basilar pneumonia with small pleural effusion  - currently on room air  - mild COVID pathway: Remdesivir  - management per primary team

## 2022-05-20 NOTE — PLAN OF CARE
Problem: PHYSICAL THERAPY ADULT  Goal: Performs mobility at highest level of function for planned discharge setting  See evaluation for individualized goals  Description: Treatment/Interventions: Functional transfer training, LE strengthening/ROM, Elevations, Therapeutic exercise, Endurance training, Patient/family training, Equipment eval/education, Gait training, Compensatory technique education, Spoke to nursing, OT, Spoke to case management  Equipment Recommended:  (pt already owns RW)       See flowsheet documentation for full assessment, interventions and recommendations  Note: Prognosis: Good  Problem List: Decreased strength, Decreased endurance, Impaired balance, Decreased mobility, Decreased safety awareness  Assessment: Pt is 80 y o  female seen for a high complexity PT evaluation s/p admit to [facility] on 5/19/2022  Pt presenting s/p fall due to LOB w/ mild L knee pain; no traumatic injuries  Pt is positive for COVID-19  Please see above for other active problem list / PMH  PT now consulted to assess functional mobility and needs for safe d/c planning  Prior to admission, pt was ambulatory w/ RW, lives w/ her family in a house w/ ramp access, 1 step down to bathroom  Currently pt requires MinAx1-ModAx1 for functional transfers; MinAx1 for ambulation w/ RW  Pt presents functioning below baseline and w/ overall mobility deficits 2* to: decreased LE strength; generalized weakness/deconditioning; decreased endurance; impaired balance; gait deviations; fatigue; impaired safety and judgement; limited insight into current deficits; bed/chair alarms  Pt currently at risk for falls  (Please find additional objective findings from PT assessment regarding body systems outlined above ) Pt will continue to benefit from skilled PT interventions to address stated impairments; to maximize functional potential; for ongoing pt/ family training; and DME needs   PT is currently recommending rehab vs home w/ home PT w/ 24/7 support  Barriers to Discharge: Inaccessible home environment, Decreased caregiver support        PT Discharge Recommendation:  (rehab vs HHPT w/ 24/7 support)          See flowsheet documentation for full assessment

## 2022-05-20 NOTE — PLAN OF CARE
Problem: Potential for Falls  Goal: Patient will remain free of falls  Description: INTERVENTIONS:  - Educate patient/family on patient safety including physical limitations  - Instruct patient to call for assistance with activity   - Consult OT/PT to assist with strengthening/mobility   - Keep Call bell within reach  - Keep bed low and locked with side rails adjusted as appropriate  - Keep care items and personal belongings within reach  - Initiate and maintain comfort rounds  - Make Fall Risk Sign visible to staff  - Offer Toileting every 2 Hours, in advance of need  - Initiate/Maintain bed/chair alarm  - Apply yellow socks and bracelet for high fall risk patients  - Consider moving patient to room near nurses station  Outcome: Progressing     Problem: MOBILITY - ADULT  Goal: Maintain or return to baseline ADL function  Description: INTERVENTIONS:  -  Assess patient's ability to carry out ADLs; assess patient's baseline for ADL function and identify physical deficits which impact ability to perform ADLs (bathing, care of mouth/teeth, toileting, grooming, dressing, etc )  - Assess/evaluate cause of self-care deficits   - Assess range of motion  - Assess patient's mobility; develop plan if impaired  - Assess patient's need for assistive devices and provide as appropriate  - Encourage maximum independence but intervene and supervise when necessary  - Involve family in performance of ADLs  - Assess for home care needs following discharge   - Consider OT consult to assist with ADL evaluation and planning for discharge  - Provide patient education as appropriate  Outcome: Progressing  Goal: Maintains/Returns to pre admission functional level  Description: INTERVENTIONS:  - Perform BMAT or MOVE assessment daily    - Set and communicate daily mobility goal to care team and patient/family/caregiver     - Collaborate with rehabilitation services on mobility goals if consulted  -encourage respositioning, as patient repositions self  - Stand patient 3 times a day  - Out of bed to chair 3 times a day   - Out of bed for meals 3 times a day  - Out of bed for toileting  - Record patient progress and toleration of activity level   Outcome: Progressing     Problem: Prexisting or High Potential for Compromised Skin Integrity  Goal: Skin integrity is maintained or improved  Description: INTERVENTIONS:  - Identify patients at risk for skin breakdown  - Assess and monitor skin integrity  - Assess and monitor nutrition and hydration status  - Monitor labs   - Assess for incontinence   - Turn and reposition patient  - Assist with mobility/ambulation  - Relieve pressure over bony prominences  - Avoid friction and shearing  - Provide appropriate hygiene as needed including keeping skin clean and dry  - Evaluate need for skin moisturizer/barrier cream  - Collaborate with interdisciplinary team   - Patient/family teaching  - Consider wound care consult   Outcome: Progressing     Problem: PAIN - ADULT  Goal: Verbalizes/displays adequate comfort level or baseline comfort level  Description: Interventions:  - Encourage patient to monitor pain and request assistance  - Assess pain using appropriate pain scale  - Administer analgesics based on type and severity of pain and evaluate response  - Implement non-pharmacological measures as appropriate and evaluate response  - Consider cultural and social influences on pain and pain management  - Notify physician/advanced practitioner if interventions unsuccessful or patient reports new pain  Outcome: Progressing     Problem: SAFETY ADULT  Goal: Patient will remain free of falls  Description: INTERVENTIONS:  - Educate patient/family on patient safety including physical limitations  - Instruct patient to call for assistance with activity   - Consult OT/PT to assist with strengthening/mobility   - Keep Call bell within reach  - Keep bed low and locked with side rails adjusted as appropriate  - Keep care items and personal belongings within reach  - Initiate and maintain comfort rounds  - Make Fall Risk Sign visible to staff  - Offer Toileting every 2 Hours, in advance of need  - Initiate/Maintain bed/chair alarm  - Apply yellow socks and bracelet for high fall risk patients  - Consider moving patient to room near nurses station  Outcome: Progressing  Goal: Maintain or return to baseline ADL function  Description: INTERVENTIONS:  -  Assess patient's ability to carry out ADLs; assess patient's baseline for ADL function and identify physical deficits which impact ability to perform ADLs (bathing, care of mouth/teeth, toileting, grooming, dressing, etc )  - Assess/evaluate cause of self-care deficits   - Assess range of motion  - Assess patient's mobility; develop plan if impaired  - Assess patient's need for assistive devices and provide as appropriate  - Encourage maximum independence but intervene and supervise when necessary  - Involve family in performance of ADLs  - Assess for home care needs following discharge   - Consider OT consult to assist with ADL evaluation and planning for discharge  - Provide patient education as appropriate  Outcome: Progressing  Goal: Maintains/Returns to pre admission functional level  Description: INTERVENTIONS:  - Perform BMAT or MOVE assessment daily    - Set and communicate daily mobility goal to care team and patient/family/caregiver     - Collaborate with rehabilitation services on mobility goals if consulted  -encourage respositioning, as patient repositions self  - Stand patient 3 times a day  - Out of bed to chair 3 times a day   - Out of bed for meals 3 times a day  - Out of bed for toileting  - Record patient progress and toleration of activity level   Outcome: Progressing

## 2022-05-20 NOTE — ASSESSMENT & PLAN NOTE
· Baseline creatinine appears to be approximately 2 0; at baseline now  · Avoid nephrotoxic medication  · Avoid hypotension   · Monitor in and out

## 2022-05-21 LAB
ANION GAP SERPL CALCULATED.3IONS-SCNC: 8 MMOL/L (ref 4–13)
ANION GAP SERPL CALCULATED.3IONS-SCNC: 8 MMOL/L (ref 4–13)
BASOPHILS # BLD AUTO: 0.03 THOUSANDS/ΜL (ref 0–0.1)
BASOPHILS NFR BLD AUTO: 0 % (ref 0–1)
BUN SERPL-MCNC: 68 MG/DL (ref 5–25)
BUN SERPL-MCNC: 70 MG/DL (ref 5–25)
CALCIUM SERPL-MCNC: 9.1 MG/DL (ref 8.3–10.1)
CALCIUM SERPL-MCNC: 9.4 MG/DL (ref 8.3–10.1)
CHLORIDE SERPL-SCNC: 101 MMOL/L (ref 100–108)
CHLORIDE SERPL-SCNC: 99 MMOL/L (ref 100–108)
CO2 SERPL-SCNC: 27 MMOL/L (ref 21–32)
CO2 SERPL-SCNC: 28 MMOL/L (ref 21–32)
CREAT SERPL-MCNC: 2.08 MG/DL (ref 0.6–1.3)
CREAT SERPL-MCNC: 2.17 MG/DL (ref 0.6–1.3)
EOSINOPHIL # BLD AUTO: 0.11 THOUSAND/ΜL (ref 0–0.61)
EOSINOPHIL NFR BLD AUTO: 2 % (ref 0–6)
ERYTHROCYTE [DISTWIDTH] IN BLOOD BY AUTOMATED COUNT: 15.2 % (ref 11.6–15.1)
GFR SERPL CREATININE-BSD FRML MDRD: 19 ML/MIN/1.73SQ M
GFR SERPL CREATININE-BSD FRML MDRD: 20 ML/MIN/1.73SQ M
GLUCOSE SERPL-MCNC: 130 MG/DL (ref 65–140)
GLUCOSE SERPL-MCNC: 153 MG/DL (ref 65–140)
GLUCOSE SERPL-MCNC: 82 MG/DL (ref 65–140)
GLUCOSE SERPL-MCNC: 84 MG/DL (ref 65–140)
GLUCOSE SERPL-MCNC: 91 MG/DL (ref 65–140)
GLUCOSE SERPL-MCNC: 96 MG/DL (ref 65–140)
HCT VFR BLD AUTO: 31.6 % (ref 34.8–46.1)
HGB BLD-MCNC: 9.9 G/DL (ref 11.5–15.4)
IMM GRANULOCYTES # BLD AUTO: 0.03 THOUSAND/UL (ref 0–0.2)
IMM GRANULOCYTES NFR BLD AUTO: 0 % (ref 0–2)
LYMPHOCYTES # BLD AUTO: 1.98 THOUSANDS/ΜL (ref 0.6–4.47)
LYMPHOCYTES NFR BLD AUTO: 29 % (ref 14–44)
MCH RBC QN AUTO: 33.2 PG (ref 26.8–34.3)
MCHC RBC AUTO-ENTMCNC: 31.3 G/DL (ref 31.4–37.4)
MCV RBC AUTO: 106 FL (ref 82–98)
MONOCYTES # BLD AUTO: 0.44 THOUSAND/ΜL (ref 0.17–1.22)
MONOCYTES NFR BLD AUTO: 7 % (ref 4–12)
NEUTROPHILS # BLD AUTO: 4.21 THOUSANDS/ΜL (ref 1.85–7.62)
NEUTS SEG NFR BLD AUTO: 62 % (ref 43–75)
NRBC BLD AUTO-RTO: 0 /100 WBCS
PLATELET # BLD AUTO: 245 THOUSANDS/UL (ref 149–390)
PMV BLD AUTO: 9.6 FL (ref 8.9–12.7)
POTASSIUM SERPL-SCNC: 4.3 MMOL/L (ref 3.5–5.3)
POTASSIUM SERPL-SCNC: 4.5 MMOL/L (ref 3.5–5.3)
RBC # BLD AUTO: 2.98 MILLION/UL (ref 3.81–5.12)
SODIUM SERPL-SCNC: 135 MMOL/L (ref 136–145)
SODIUM SERPL-SCNC: 136 MMOL/L (ref 136–145)
TSH SERPL DL<=0.05 MIU/L-ACNC: 3.35 UIU/ML (ref 0.45–4.5)
WBC # BLD AUTO: 6.8 THOUSAND/UL (ref 4.31–10.16)

## 2022-05-21 PROCEDURE — 99233 SBSQ HOSP IP/OBS HIGH 50: CPT | Performed by: INTERNAL MEDICINE

## 2022-05-21 PROCEDURE — 82948 REAGENT STRIP/BLOOD GLUCOSE: CPT

## 2022-05-21 PROCEDURE — 80048 BASIC METABOLIC PNL TOTAL CA: CPT | Performed by: INTERNAL MEDICINE

## 2022-05-21 PROCEDURE — 84443 ASSAY THYROID STIM HORMONE: CPT | Performed by: INTERNAL MEDICINE

## 2022-05-21 PROCEDURE — XW033E5 INTRODUCTION OF REMDESIVIR ANTI-INFECTIVE INTO PERIPHERAL VEIN, PERCUTANEOUS APPROACH, NEW TECHNOLOGY GROUP 5: ICD-10-PCS | Performed by: HOSPITALIST

## 2022-05-21 PROCEDURE — 94760 N-INVAS EAR/PLS OXIMETRY 1: CPT

## 2022-05-21 PROCEDURE — 85025 COMPLETE CBC W/AUTO DIFF WBC: CPT | Performed by: INTERNAL MEDICINE

## 2022-05-21 PROCEDURE — 97167 OT EVAL HIGH COMPLEX 60 MIN: CPT

## 2022-05-21 RX ADMIN — GABAPENTIN 300 MG: 300 CAPSULE ORAL at 09:10

## 2022-05-21 RX ADMIN — INSULIN LISPRO 4 UNITS: 100 INJECTION, SOLUTION INTRAVENOUS; SUBCUTANEOUS at 16:33

## 2022-05-21 RX ADMIN — DOCUSATE SODIUM 100 MG: 100 CAPSULE, LIQUID FILLED ORAL at 16:33

## 2022-05-21 RX ADMIN — SERTRALINE 25 MG: 25 TABLET, FILM COATED ORAL at 09:09

## 2022-05-21 RX ADMIN — LEVOTHYROXINE SODIUM 112 MCG: 112 TABLET ORAL at 05:33

## 2022-05-21 RX ADMIN — GABAPENTIN 300 MG: 300 CAPSULE ORAL at 16:33

## 2022-05-21 RX ADMIN — FENOFIBRATE 145 MG: 145 TABLET ORAL at 09:10

## 2022-05-21 RX ADMIN — SENNOSIDES 8.6 MG: 8.6 TABLET, FILM COATED ORAL at 09:09

## 2022-05-21 RX ADMIN — ACETAMINOPHEN 650 MG: 325 TABLET, FILM COATED ORAL at 20:04

## 2022-05-21 RX ADMIN — TORSEMIDE 20 MG: 20 TABLET ORAL at 09:09

## 2022-05-21 RX ADMIN — LIDOCAINE 5% 1 PATCH: 700 PATCH TOPICAL at 16:33

## 2022-05-21 RX ADMIN — ENOXAPARIN SODIUM 30 MG: 30 INJECTION SUBCUTANEOUS at 21:36

## 2022-05-21 RX ADMIN — GABAPENTIN 300 MG: 300 CAPSULE ORAL at 21:36

## 2022-05-21 RX ADMIN — CLOPIDOGREL BISULFATE 75 MG: 75 TABLET ORAL at 09:10

## 2022-05-21 RX ADMIN — SENNOSIDES A AND B 15 ML: 8.8 SYRUP ORAL at 09:10

## 2022-05-21 RX ADMIN — DOCUSATE SODIUM 100 MG: 100 CAPSULE, LIQUID FILLED ORAL at 09:09

## 2022-05-21 RX ADMIN — ISOSORBIDE MONONITRATE 30 MG: 30 TABLET, EXTENDED RELEASE ORAL at 09:10

## 2022-05-21 RX ADMIN — METOPROLOL TARTRATE 25 MG: 25 TABLET, FILM COATED ORAL at 21:36

## 2022-05-21 RX ADMIN — METOPROLOL TARTRATE 25 MG: 25 TABLET, FILM COATED ORAL at 09:10

## 2022-05-21 RX ADMIN — ALLOPURINOL 150 MG: 300 TABLET ORAL at 09:09

## 2022-05-21 NOTE — PLAN OF CARE
Problem: Prexisting or High Potential for Compromised Skin Integrity  Goal: Skin integrity is maintained or improved  Description: INTERVENTIONS:  - Identify patients at risk for skin breakdown  - Assess and monitor skin integrity  - Assess and monitor nutrition and hydration status  - Monitor labs   - Assess for incontinence   - Turn and reposition patient  - Assist with mobility/ambulation  - Relieve pressure over bony prominences  - Avoid friction and shearing  - Provide appropriate hygiene as needed including keeping skin clean and dry  - Evaluate need for skin moisturizer/barrier cream  - Collaborate with interdisciplinary team   - Patient/family teaching  - Consider wound care consult   Outcome: Progressing     Problem: PAIN - ADULT  Goal: Verbalizes/displays adequate comfort level or baseline comfort level  Description: Interventions:  - Encourage patient to monitor pain and request assistance  - Assess pain using appropriate pain scale  - Administer analgesics based on type and severity of pain and evaluate response  - Implement non-pharmacological measures as appropriate and evaluate response  - Consider cultural and social influences on pain and pain management  - Notify physician/advanced practitioner if interventions unsuccessful or patient reports new pain  Outcome: Progressing     Problem: Nutrition/Hydration-ADULT  Goal: Nutrient/Hydration intake appropriate for improving, restoring or maintaining nutritional needs  Description: Monitor and assess patient's nutrition/hydration status for malnutrition  Collaborate with interdisciplinary team and initiate plan and interventions as ordered  Monitor patient's weight and dietary intake as ordered or per policy  Utilize nutrition screening tool and intervene as necessary  Determine patient's food preferences and provide high-protein, high-caloric foods as appropriate       INTERVENTIONS:  - Monitor oral intake, urinary output, labs, and treatment plans  - Assess nutrition and hydration status and recommend course of action  - Evaluate amount of meals eaten  - Assist patient with eating if necessary   - Allow adequate time for meals  - Recommend/ encourage appropriate diets, oral nutritional supplements, and vitamin/mineral supplements  - Order, calculate, and assess calorie counts as needed  - Recommend, monitor, and adjust tube feedings and TPN/PPN based on assessed needs  - Assess need for intravenous fluids  - Provide specific nutrition/hydration education as appropriate  - Include patient/family/caregiver in decisions related to nutrition  Outcome: Progressing     Problem: INFECTION - ADULT  Goal: Absence or prevention of progression during hospitalization  Description: INTERVENTIONS:  - Assess and monitor for signs and symptoms of infection  - Monitor lab/diagnostic results  - Monitor all insertion sites, i e  indwelling lines, tubes, and drains  - Monitor endotracheal if appropriate and nasal secretions for changes in amount and color  - [place] appropriate cooling/warming therapies per order  - Administer medications as ordered  - Instruct and encourage patient and family to use good hand hygiene technique  - Identify and instruct in appropriate isolation precautions for identified infection/condition  Outcome: Progressing     Problem: DISCHARGE PLANNING  Goal: Discharge to home or other facility with appropriate resources  Description: INTERVENTIONS:  - Identify barriers to discharge w/patient and caregiver  - Arrange for needed discharge resources and transportation as appropriate  - Identify discharge learning needs (meds, wound care, etc )  - Arrange for interpretive services to assist at discharge as needed  - Refer to Case Management Department for coordinating discharge planning if the patient needs post-hospital services based on physician/advanced practitioner order or complex needs related to functional status, cognitive ability, or social support system  Outcome: Progressing     Problem: Knowledge Deficit  Goal: Patient/family/caregiver demonstrates understanding of disease process, treatment plan, medications, and discharge instructions  Description: Complete learning assessment and assess knowledge base    Interventions:  - Provide teaching at level of understanding  - Provide teaching via preferred learning methods  Outcome: Progressing     Problem: RESPIRATORY - ADULT  Goal: Achieves optimal ventilation and oxygenation  Description: INTERVENTIONS:  - Assess for changes in respiratory status  - Assess for changes in mentation and behavior  - Position to facilitate oxygenation and minimize respiratory effort  - Oxygen administered by appropriate delivery if ordered  - Initiate smoking cessation education as indicated  - Encourage broncho-pulmonary hygiene including cough, deep breathe, Incentive Spirometry  - Assess the need for suctioning and aspirate as needed  - Assess and instruct to report SOB or any respiratory difficulty  - Respiratory Therapy support as indicated  Outcome: Progressing     Problem: METABOLIC, FLUID AND ELECTROLYTES - ADULT  Goal: Electrolytes maintained within normal limits  Description: INTERVENTIONS:  - Monitor labs and assess patient for signs and symptoms of electrolyte imbalances  - Administer electrolyte replacement as ordered  - Monitor response to electrolyte replacements, including repeat lab results as appropriate  - Instruct patient on fluid and nutrition as appropriate  Outcome: Progressing  Goal: Fluid balance maintained  Description: INTERVENTIONS:  - Monitor labs   - Monitor I/O and WT  - Instruct patient on fluid and nutrition as appropriate  - Assess for signs & symptoms of volume excess or deficit  Outcome: Progressing

## 2022-05-21 NOTE — OCCUPATIONAL THERAPY NOTE
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 ([place] [address] )    Renal artery stenosis (HCC)    Lab test positive for detection of COVID-19 virus    UTI (urinary tract infection)    Past Medical History  History reviewed  No pertinent past medical history  Past Surgical History  History reviewed  No pertinent surgical history  05/21/22 0951   OT Last Visit   OT Visit Date 05/21/22   Note Type   Note type Evaluation   Restrictions/Precautions   Weight Bearing Precautions Per Order Yes   LLE Weight Bearing Per Order WBAT   Other Precautions Contact/isolation; Airborne/isolation; Chair Alarm; Bed Alarm; Fall Risk;Pain  (covid +)   Pain Assessment   Pain Assessment Tool 0-10   Pain Score 5   Pain Location/Orientation Orientation: Left; Location: Groin   Hospital Pain Intervention(s) Repositioned; Ambulation/increased activity   Home Living   Type of [address] [place] [address] One level;Ramped entrance   Bathroom Shower/Tub Walk-in shower   Bathroom Toilet Raised   Bathroom Equipment Grab bars in shower; Shower chair;Grab bars around [employer] bars   Additional Comments Pt reports living in MIL attached to [facility] home  Sleeps in lift chair   Prior Function   Level of [place] Needs assistance with IADLs; Needs assistance with ADLs and functional mobility   Lives With Daughter;Family  (dtr, LEXA, 3 grandkids)   Receives Help From Family;Home health   ADL Assistance Needs assistance   IADLs Needs assistance   Falls in the last 6 months 1 to 4  (1 leading to current admission)   Vocational Retired   Comments Pt reports having HHA 3x/wk, as well as HHOT/PT  Reports she is only alone ~ 20 minutes at a time   Pt is a questionable historian at times - conflicting info   Lifestyle   Autonomy PTA, pt reports having S for ADLs, A for IADLs, RW for fnxl mobility (-)    Reciprocal Relationships Family   Service to Others Retired   Intrinsic Gratification Adult coloring books   Psychosocial   Psychosocial (WDL) WDL   ADL   Where Assessed Edge of bed   Eating Assistance 7  Independent   Grooming Assistance 5  Supervision/Setup   UB [address] 4  Minimal Assistance   LB [address] 2  Maximal [address] 4  [address] 2  Maximal [address]  3  Moderate Assistance   Bed Mobility   Supine to Sit 3  Moderate assistance   Additional items Assist x 1; Increased time required;LE management;HOB elevated; Bedrails   Sit to Supine 3  Moderate assistance   Additional items Assist x 1;HOB elevated; Bedrails; Increased time required;LE management   Additional Comments Posterior lean at times with dynamic sitting balance  Cues and A to correct   Transfers   Sit to Stand 4  Minimal assistance   Additional items Assist x 1; Increased time required;Verbal cues   Stand to Sit 4  Minimal assistance   Additional items Assist x 1; Increased time required;Verbal cues   Additional Comments Transfers with RW   Functional Mobility   Functional Mobility 3  Moderate assistance   Additional Comments Fluctuates between min to mod A, limited distance 2* L groin pain   Additional items Rolling walker   Balance   Static Sitting Fair -   Dynamic Sitting Fair -   Static Standing Poor +   Dynamic Standing Poor +   Ambulatory Poor +   Activity Tolerance   Activity Tolerance Patient limited by fatigue;Patient limited by pain   Nurse Made Aware RN clearance for session   RUE Assessment   RUE Assessment WFL   LUE Assessment   LUE Assessment WFL   Cognition   Arousal/Participation Responsive; Cooperative   Attention Attends with cues to redirect   Orientation Level Oriented X4   Memory Within functional limits   Following Commands Follows one step commands without difficulty   Comments Pt pleasant and cooperative t/o session   Assessment   Limitation Decreased ADL status; Decreased Safe judgement during ADL;Decreased UE strength;Decreased cognition;Decreased endurance;Decreased high-level ADLs; Decreased self-care trans   Prognosis Good   Assessment Pt is a 80 y o  female admitted [facility]2022 w/ Fall, pt also found to be COVID (+)  PMH includes renal artery stenosis, CK, hypertension, DM, hypothyroidism, AZUL  Pt with active OT orders and activity as tolerated and ambulate  orders  Pt seen as a co-evaluation with PT due to the patient's co-morbidities, clinically unstable presentation/clinical complexity, and present impairments  As per pt report, pta, resides with dtr, LEXA, and their family in a [facility] ramped entrance  Pt reports having S w/  ADLS and A for IADLS, (-) drove  Upon evaluation, pt currently requires MIN to MOD A with RW for transfers and mobility  Pt currently requires I eating, S grooming, MIN A UB ADLs, MAX A LB ADLs, and MOD A toileting  Pt is limited at this time 2*: pain, endurance, activity tolerance, functional mobility, balance, functional standing tolerance, decreased I w/ ADLS/IADLS, strength and decreased safety awareness  The following Occupational Performance Areas to address include: grooming, bathing/shower, toilet hygiene, dressing, health maintenance, functional mobility, community mobility and clothing management  Pt to benefit from immediate acute skilled OT to address above deficits, improve overall functional independence, maximize fnxl mobility and reduce caregiver burden  From OT standpoint, recommendation at time of d/c would be STR vs home with skilled therapy and increased social support - pending progress  Pt was left after session with all current needs met   The patient's raw score on the AM-PAC Daily Activity inpatient short form is 15, standardized score is 34 69, less than 39 4  Patients at this level are likely to benefit from discharge to post-acute rehabilitation services  Please refer to the recommendation of the Occupational Therapist for safe discharge planning  Goals   Patient Goals To decrease her pain   LTG Time Frame 10-14   Plan   Treatment Interventions ADL retraining;Functional transfer training;UE strengthening/ROM; Endurance training;Cognitive reorientation;Patient/family training;Equipment evaluation/education; Compensatory technique education;Continued evaluation; Energy conservation; Activityengagement   Goal Expiration Date 06/04/22   OT Frequency 3-5x/wk   Recommendation   OT Discharge Recommendation Post acute rehabilitation services   Equipment Recommended   (vs home with skilled therapy and increased social support - pending progress)   OT - OK to Discharge   (yes to rehab no to home)   AM-PAC Daily Activity Inpatient   Lower Body Dressing 2   Bathing 2   Toileting 2   Upper Body Dressing 3   Grooming 3   Eating 3   Daily Activity Raw Score 15   Daily Activity Standardized Score (Calc for Raw Score >=11) 34 69   AM-PAC Applied Cognition Inpatient   Following a Speech/Presentation 3   Understanding Ordinary Conversation 4   Taking Medications 4   Remembering Where Things Are Placed or Put Away 3   Remembering List of 4-5 Errands 3   Taking Care of Complicated Tasks 3   Applied Cognition Raw Score 20   Applied Cognition Standardized Score 41 76        GOALS    - Pt will complete UB dressing/self care/bathing w/ S using adaptive device and DME as needed    - Pt will complete LB dressing/self care/bathing w/ S using adaptive device and DME as needed    - Pt will complete toileting w/ MI w/ G hygiene/thoroughness using DME as needed    - Pt will improve functional transfers to MI on/off all surfaces using DME as needed w/ G balance/safety     - Pt will improve functional mobility during ADL/IADL/leisure tasks to MI using DME as needed w/ G balance/safety     - Pt will demonstrate G carryover of pt/caregiver education and training as appropriate      - Pt will demonstrate 100% carryover of energy conservation techniques t/o functional I/ADL/leisure tasks w/o cues s/p skilled education    - Pt will engage in ongoing cognitive assessment w/ G participation to assist w/ safe d/c planning/recommendations    - Pt will increase static and dynamic standing/sitting balance to F+ using AD/DME as needed to increase safety and I during fnxl transfers and ADLs    - Pt will maintain upright sitting position for at least 20 min during dynamic fnxl activity with F+ balance and endurance to improve ADL performance and independence, as well as reduce risk of falls     Zachary Weller MS, OTR/L

## 2022-05-21 NOTE — ASSESSMENT & PLAN NOTE
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

## 2022-05-21 NOTE — PROGRESS NOTES
[address] [facility]  Progress Note - Jacqueline Cornejo 1934, 80 y o  female MRN: [id number]  Unit/Bed#: [facility] 826-01 Encounter: [id number]  Primary Care Provider: Timothy Vang DO   Date and time admitted to hospital: 5/19/2022  5:11 AM    UTI (urinary tract infection)  Assessment & Plan  Urine is positive for leukocytes  Will monitor off antibiotics for now  If has fever, wbc count elevation would start antibiotics  Lab test positive for detection of COVID-19 virus  Assessment & Plan  Patient was positive for covid on Thursday  Start treatment as patient has diabetes  Mild covid pathway   Labs obtained  Start remdesivir; if patient continues to be off oxygen, consider stopping tomorrow  Will stop dexamethasone as patient currently on room air and saturating well        Renal artery stenosis (HCC)  Assessment & Plan  Monitor blood pressure closely     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 ([facility] )  Assessment & Plan  · Baseline creatinine appears to be approximately 2 0; at baseline now  · Avoid nephrotoxic medication  · Avoid hypotension   · Monitor in and out      Essential hypertension  Assessment & Plan  Continue with metoprolol 25 mg bid  Demodex 50 mg am and 20 mg afternoon    AZUL (obstructive sleep apnea)  Assessment & Plan  Monitor closely might need CPAP at night      DM type 2 (diabetes mellitus, type 2) ([facility] )  Assessment & Plan  No results found for: HGBA1C    Recent Labs     05/19/22  1835 05/20/22  1723 05/21/22  0735 05/21/22  1252   POCGLU 196* 133 82 130       Blood Sugar Average: Last 72 hrs:  (P) 135 25  SSI premeal fingerstick check  Avoid hypoglycemia  On steroids  Hypothyroidism  Assessment & Plan  Continue levothyroxine 112 mg daily     * Fall  Assessment & Plan  · Patient was unwitnessed fall at home  · Work up in ED no spine and brain injury  · Radiology contacted regarding knee arthoplasty is displaced     · Orthopedics consulted - no surgical needs at this time  · Fall precautions; PT/OT  · Unclear history; but 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        VTE Pharmacologic Prophylaxis:   Pharmacologic: Enoxaparin (Lovenox)  Mechanical VTE Prophylaxis in Place: Yes    Patient Centered Rounds: I have performed bedside rounds with nursing staff today  Discussions with Specialists or Other Care Team Provider:     Education and Discussions with Family / Patient: Discussed treatment plan with family and patient who agree with current plan; encouraged to ask questions and participate  Time Spent for Care: 45 minutes  More than 50% of total time spent on counseling and coordination of care as described above  Current Length of Stay: 0 day(s)    Current Patient Status: Inpatient   Certification Statement: The patient will continue to require additional inpatient hospital stay due to Treatment of COVID and awaiting discharge to follow on facility versus home    Discharge Plan: To be determined, likely tomorrow    Code Status: Level 1 - Full Code      Subjective:   Patient seen examined bedside, no acute distress or discomfort noted  Labs vital stable; have discontinued steroids yesterday and will discontinue remdesivir today  Patient 96% SpO2 on room air  Will discuss with family regarding rehab recommendations; PT/OT recommending post acute services; case management stating that family may wish to take patient home  Stable for follow-up with orthopedics in the outpatient setting; will need a few days of isolation once discharge from hospital     Objective:     Vitals:   Temp (24hrs), Av 9 °F (36 6 °C), Min:97 4 °F (36 3 °C), Max:98 2 °F (36 8 °C)    Temp:  [97 4 °F (36 3 °C)-98 2 °F (36 8 °C)] 98 2 °F (36 8 °C)  HR:  [58-62] 62  Resp:  [20] 20  BP: (112-130)/(57-62) 112/62  SpO2:  [95 %-96 %] 95 %  Body mass index is 31 71 kg/m²  Input and Output Summary (last 24 hours):        Intake/Output Summary (Last 24 hours) at 2022 [address] at 5/21/2022 0859  Gross per 24 hour   Intake 20 ml   Output 400 ml   Net -380 ml       Physical Exam:     Physical Exam  Vitals and nursing note reviewed  Constitutional:       General: She is not in acute distress  Appearance: She is well-developed  HENT:      Head: Normocephalic and atraumatic  Eyes:      Conjunctiva/sclera: Conjunctivae normal    Cardiovascular:      Rate and Rhythm: Normal rate  Heart sounds: No murmur heard  Pulmonary:      Effort: Pulmonary effort is normal  No respiratory distress  Abdominal:      Palpations: Abdomen is soft  Tenderness: There is no abdominal tenderness  Musculoskeletal:      Cervical back: Neck supple  Skin:     General: Skin is warm and dry  Neurological:      Mental Status: She is alert  Psychiatric:         Behavior: Behavior normal            Additional Data:     Labs:    Results from last 7 days   Lab Units 05/21/22  0829   WBC Thousand/uL 6 80   HEMOGLOBIN g/dL 9 9*   HEMATOCRIT % 31 6*   PLATELETS Thousands/uL 245   NEUTROS PCT % 62   LYMPHS PCT % 29   MONOS PCT % 7   EOS PCT % 2     Results from last 7 days   Lab Units 05/21/22  0630 05/20/22  0603   SODIUM mmol/L 136 136   POTASSIUM mmol/L 4 5 4 1   CHLORIDE mmol/L 101 103   CO2 mmol/L 27 26   BUN mg/dL 68* 55*   CREATININE mg/dL 2 17* 1 92*   ANION GAP mmol/L 8 7   CALCIUM mg/dL 9 1 8 9   ALBUMIN g/dL  --  2 7*   TOTAL BILIRUBIN mg/dL  --  0 45   ALK PHOS U/L  --  39*   ALT U/L  --  17   AST U/L  --  26   GLUCOSE RANDOM mg/dL 84 146*         Results from last 7 days   Lab Units 05/21/22  1555 05/21/22  1252 05/21/22  0735 05/20/22  1723 05/19/22  1835   POC GLUCOSE mg/dl 153* 130 82 133 196*         Results from last 7 days   Lab Units 05/19/22  1953   PROCALCITONIN ng/ml 0 20           * I Have Reviewed All Lab Data Listed Above  * Additional Pertinent Lab Tests Reviewed:  All Labs Within Last 24 Hours Reviewed    Imaging:    Imaging Reports Reviewed Today Include:   Imaging Personally Reviewed by Myself Includes:      Recent Cultures (last 7 days):           Last 24 Hours Medication List:   Current Facility-Administered Medications   Medication Dose Route Frequency Provider Last Rate    acetaminophen  650 mg Oral Q6H PRN Phani Nelson MD      albuterol  2 puff Inhalation Q4H PRN Phani Nelson MD      allopurinol  150 mg Oral Daily Phani Nelson MD      calcium carbonate  1,000 mg Oral Daily PRN Phani Nelson MD      clopidogrel  75 mg Oral Daily Phani Nelson MD      docusate sodium  100 mg Oral BID Phani Nelson MD      enoxaparin  30 mg Subcutaneous Daily Phani Nelson MD      fenofibrate  145 mg Oral Daily Phani Nelson MD      gabapentin  300 mg Oral TID Phani Nelson MD      HYDROcodone-acetaminophen  1 tablet Oral Q6H PRN Phani Nelson MD      insulin lispro  4-20 Units Subcutaneous TID AC Phani Nelson MD      isosorbide mononitrate  30 mg Oral Daily Phani Nelson MD      levothyroxine  112 mcg Oral Early Morning Phani Nelson MD      lidocaine  1 patch Topical Daily Phani Nelson MD      methocarbamol  750 mg Intravenous Once Phani Nelson MD      methocarbamol  750 mg Oral Q6H PRN Phani Nelson MD      metoprolol tartrate  25 mg Oral Q12H [address] Phani Nelson MD      multivitamin with iron-minerals  15 mL Oral Daily Phani Nelson MD      nitroglycerin  0 4 mg Sublingual Q5 Min PRN Phani Nelson MD      ondansetron  4 mg Intravenous Q6H PRN Phani Nelson MD      senna  1 tablet Oral Daily Phani Nelson MD      sertraline  25 mg Oral Daily Phani Nelson MD      torsemide  20 mg Oral Daily Phani Nelson MD      torsemide  40 mg Oral Daily Phani Nelson MD          Today, Patient Was Seen By: David Patiño MD    ** Please Note: Dictation voice to text software may have been used in the creation of this document   **

## 2022-05-21 NOTE — UTILIZATION REVIEW
Continued Stay Review    Date: 5/21/2022                          Current Patient Class:  Inpatient   Current Level of Care:  Med surg    Pt was admitted to observation status on 5/19 changed to inpatient admission  On 5/21 due to need for contiued care s/p fall at home and found to be COVID -19  Positive on pathway  05/21/22 1233  Inpatient Admission         Transfer Service: Hospitalist       Question Answer   Level of Care Med Surg   Estimated length of stay More than 2 Midnights   Certification I certify that inpatient services are medically necessary for this patient for a duration of greater than two midnights  See H&P and MD Progress Notes for additional information about the patient's course of treatment  HPI:88 y o  female initially admitted on 5/19 -  S/p fall at home, ortho eval for possible displaced arthroplasty of left knee joint  Found to be COVID - 19 positive  Started on Mild pathway  Remdesivir as she is diabetic  Urine positive for leukocytes monitor off abx's  Assessment/Plan: 5/21  - No acute events overnight, she is A & O x 4  Pt on room air sat 95%, Bun/cr to 68/2 17  OT evaluation -  S/p fall - Plan acute rehab vs home with skilled therapy and increased social support, pending progress       Vital Signs:   Date/Time Temp Pulse Resp BP SpO2 O2 Device Patient Position - Orthostatic VS   05/21/22 0719 98 2 °F (36 8 °C) 62 20 112/62 95 % None (Room air) --   05/20/22 2259 98 1 °F (36 7 °C) 58 20 117/57 96 % -- Sitting   05/20/22 1713 97 4 °F (36 3 °C) Abnormal  62 20 130/62 96 % -- Sitting   05/19/22 2345 -- -- -- -- -- None (Room air) --   05/19/22 2222 99 2 °F (37 3 °C) 86 16 142/65 92 % -- Lying   05/19/22 1924 -- 80 20 172/72 Abnormal  97 % -- Lying   05/19/22 1900 98 4 °F (36 9 °C) 83 20 158/66 -- -- Lying   05/19/22 1733 -- -- -- -- 95 % None (Room air) --   05/19/22 1349 -- 89 16 159/67 97 % None (Room air) Lying   05/19/22 0600 -- 84 18 149/72 96 % None (Room air) Lying 05/19/22 0545 -- 80 18 149/65 96 % None (Room air) Lying   05/19/22 05:29:12 -- 81 18 149/91 95 % None (Room air) --           Pertinent Labs/Diagnostic Results:       Results from last 7 days   Lab Units 05/21/22  0829 05/20/22 0603 05/19/22 1959 05/19/22  0526   WBC Thousand/uL 6 80 7 94 8 03  --    HEMOGLOBIN g/dL 9 9* 9 9* 10 0*  --    I STAT HEMOGLOBIN g/dl  --   --   --  11 9   HEMATOCRIT % 31 6* 31 4* 32 1*  --    HEMATOCRIT, ISTAT %  --   --   --  35   PLATELETS Thousands/uL 245 218 236  --    NEUTROS ABS Thousands/µL 4 21 6 43 6 90  --          Results from last 7 days   Lab Units 05/21/22 0630 05/20/22 0603 05/19/22 1953 05/19/22  0526   SODIUM mmol/L 136 136 137  --    POTASSIUM mmol/L 4 5 4 1 4 1  --    CHLORIDE mmol/L 101 103 105  --    CO2 mmol/L 27 26 24  --    CO2, I-STAT mmol/L  --   --   --  30   ANION GAP mmol/L 8 7 8  --    BUN mg/dL 68* 55* 48*  --    CREATININE mg/dL 2 17* 1 92* 1 77*  --    EGFR ml/min/1 73sq m 19 22 25  --    CALCIUM mg/dL 9 1 8 9 8 8  --    CALCIUM, IONIZED, ISTAT mmol/L  --   --   --  1 14   MAGNESIUM mg/dL  --  2 6  --   --    PHOSPHORUS mg/dL  --  2 9  --   --      Results from last 7 days   Lab Units 05/20/22 0603 05/19/22 1953   AST U/L 26 26   ALT U/L 17 17   ALK PHOS U/L 39* 39*   TOTAL PROTEIN g/dL 6 8 6 8   ALBUMIN g/dL 2 7* 2 6*   TOTAL BILIRUBIN mg/dL 0 45 0 48     Results from last 7 days   Lab Units 05/21/22  0735 05/20/22  1723 05/19/22  1835   POC GLUCOSE mg/dl 82 133 196*     Results from last 7 days   Lab Units 05/21/22 0630 05/20/22  0603 05/19/22 1953   GLUCOSE RANDOM mg/dL 84 146* 139       Results from last 7 days   Lab Units 05/19/22  0526   PH, KHALIF I-STAT  7 437*   PCO2, KHALIF ISTAT mm HG 42 7   PO2, KHALIF ISTAT mm HG 25 0*   HCO3, KHALIF ISTAT mmol/L 28 8   I STAT BASE EXC mmol/L 4*   I STAT O2 SAT % 47*         Results from last 7 days   Lab Units 05/19/22  2308 05/19/22 1953   HS TNI 0HR ng/L  --  181*   HS TNI 2HR ng/L 185*  --    HSTNI D2 ng/L 4  --    HS TNI 4HR ng/L 183*  --    HSTNI D4 ng/L 2  --      Results from last 7 days   Lab Units 05/19/22 1953   D-DIMER QUANTITATIVE ug/ml FEU 3 44*         Results from last 7 days   Lab Units 05/21/22  0630   TSH 3RD GENERATON uIU/mL 3 350     Results from last 7 days   Lab Units 05/19/22 1953   PROCALCITONIN ng/ml 0 20       Results from last 7 days   Lab Units 05/19/22 1953   NT-PRO BNP pg/mL 23,264*       Results from last 7 days   Lab Units 05/20/22  0603 05/19/22 1953   CRP mg/L 121 0* 87 3*       Results from last 7 days   Lab Units 05/20/22  0605 05/19/22  0911   CLARITY UA  Clear Turbid   COLOR UA  Colorless Light Yellow   SPEC GRAV UA  1 010 1 016   PH UA  5 0 5 5   GLUCOSE UA mg/dl Negative Negative   KETONES UA mg/dl Negative Negative   BLOOD UA  Negative Moderate*   PROTEIN UA mg/dl Negative 30 (1+)*   NITRITE UA  Negative Negative   BILIRUBIN UA  Negative Negative   UROBILINOGEN UA (BE) mg/dl <2 0 <2 0   LEUKOCYTES UA  Negative Large*   WBC UA /hpf  --  Innumerable*   RBC UA /hpf  --  1-2   BACTERIA UA /hpf  --  None Seen   EPITHELIAL CELLS WET PREP /hpf  --  Occasional         Medications:   Scheduled Medications:  allopurinol, 150 mg, Oral, Daily  clopidogrel, 75 mg, Oral, Daily  docusate sodium, 100 mg, Oral, BID  enoxaparin, 30 mg, Subcutaneous, Daily  fenofibrate, 145 mg, Oral, Daily  gabapentin, 300 mg, Oral, TID  insulin lispro, 4-20 Units, Subcutaneous, TID AC  isosorbide mononitrate, 30 mg, Oral, Daily  levothyroxine, 112 mcg, Oral, Early Morning  lidocaine, 1 patch, Topical, Daily  methocarbamol, 750 mg, Intravenous, Once  metoprolol tartrate, 25 mg, Oral, Q12H KARINE  multivitamin with iron-minerals, 15 mL, Oral, Daily  remdesivir, 100 mg, Intravenous, Q24H  senna, 1 tablet, Oral, Daily  sertraline, 25 mg, Oral, Daily  torsemide, 20 mg, Oral, Daily  torsemide, 40 mg, Oral, Daily      Continuous IV Infusions:     PRN Meds:  acetaminophen, 650 mg, Oral, Q6H PRN  albuterol, 2 puff, Inhalation, Q4H PRN  calcium carbonate, 1,000 mg, Oral, Daily PRN  HYDROcodone-acetaminophen, 1 tablet, Oral, Q6H PRN  methocarbamol, 750 mg, Oral, Q6H PRN  nitroglycerin, 0 4 mg, Sublingual, Q5 Min PRN  ondansetron, 4 mg, Intravenous, Q6H PRN    Nursing Orders - VS - fall precautions - incentive spirometry - continuous pulse ox monitoring - titrate O2 to sat > 90% - activity as tolerated with assistance - I & O q shift - diet cons carb 4 gm NA  - PT/OT evaluation     Discharge Plan: D     Network Utilization Review Department  ATTENTION: Please call with any questions or concerns to [phone] and carefully listen to the prompts so that you are directed to the right person  All voicemails are confidential   Tayler Self all requests for admission clinical reviews, approved or denied determinations and any other requests to dedicated fax number below belonging to the campus where the patient is receiving treatment   List of dedicated fax numbers for the Facilities:  [address] DENIALS (Administrative/Medical Necessity) [phone]   [address] (Maternity/NICU/Pediatrics) [phone]   [address]  [address] [place] [address] [address] [place] [address] [address] [facility] [facility] [phone]

## 2022-05-21 NOTE — ASSESSMENT & PLAN NOTE
No results found for: HGBA1C    Recent Labs     05/19/22  1835 05/20/22  1723 05/21/22  0735 05/21/22  1252   POCGLU 196* 133 82 130       Blood Sugar Average: Last 72 hrs:  (P) 135 25  SSI premeal fingerstick check  Avoid hypoglycemia  On steroids

## 2022-05-21 NOTE — ASSESSMENT & PLAN NOTE
Patient was positive for covid on Thursday     Start treatment as patient has diabetes  Mild covid pathway   Labs obtained  Start remdesivir; if patient continues to be off oxygen, consider stopping tomorrow  Will stop dexamethasone as patient currently on room air and saturating well

## 2022-05-21 NOTE — PLAN OF CARE
Problem: OCCUPATIONAL THERAPY ADULT  Goal: Performs self-care activities at highest level of function for planned discharge setting  See evaluation for individualized goals  Description: Treatment Interventions: ADL retraining, Functional transfer training, UE strengthening/ROM, Endurance training, Cognitive reorientation, Patient/family training, Equipment evaluation/education, Compensatory technique education, Continued evaluation, Energy conservation, Activityengagement  Equipment Recommended:  (vs home with skilled therapy and increased social support - pending progress)       See flowsheet documentation for full assessment, interventions and recommendations  Outcome: Progressing  Note: Limitation: Decreased ADL status, Decreased Safe judgement during ADL, Decreased UE strength, Decreased cognition, Decreased endurance, Decreased high-level ADLs, Decreased self-care trans  Prognosis: Good  Assessment: Pt is a 80 y o  female admitted [facility] on [facility]/19/2022 w/ Fall, pt also found to be COVID (+)  PMH includes renal artery stenosis, CK, hypertension, DM, hypothyroidism, AZUL  Pt with active OT orders and activity as tolerated and ambulate  orders  Pt seen as a co-evaluation with PT due to the patient's co-morbidities, clinically unstable presentation/clinical complexity, and present impairments  As per pt report, pta, resides with dtr, LEXA, and their family in a [facility] ([facility]), ramped entrance  Pt reports having S w/  ADLS and A for IADLS, (-) drove  Upon evaluation, pt currently requires MIN to MOD A with RW for transfers and mobility  Pt currently requires I eating, S grooming, MIN A UB ADLs, MAX A LB ADLs, and MOD A toileting  Pt is limited at this time 2*: pain, endurance, activity tolerance, functional mobility, balance, functional standing tolerance, decreased I w/ ADLS/IADLS, strength and decreased safety awareness  The following Occupational Performance Areas to address include: grooming, bathing/shower, toilet hygiene, dressing, health maintenance, functional mobility, community mobility and clothing management  Pt to benefit from immediate acute skilled OT to address above deficits, improve overall functional independence, maximize fnxl mobility and reduce caregiver burden  From OT standpoint, recommendation at time of d/c would be STR vs home with skilled therapy and increased social support - pending progress  Pt was left after session with all current needs met  The patient's raw score on the AM-PAC Daily Activity inpatient short form is 15, standardized score is 34 69, less than 39 4  Patients at this level are likely to benefit from discharge to post-acute rehabilitation services  Please refer to the recommendation of the Occupational Therapist for safe discharge planning       OT Discharge Recommendation: Post acute rehabilitation services  OT - OK to Discharge:  (yes to rehab no to home)

## 2022-05-22 LAB
GLUCOSE SERPL-MCNC: 108 MG/DL (ref 65–140)
GLUCOSE SERPL-MCNC: 142 MG/DL (ref 65–140)
GLUCOSE SERPL-MCNC: 91 MG/DL (ref 65–140)

## 2022-05-22 PROCEDURE — 99233 SBSQ HOSP IP/OBS HIGH 50: CPT | Performed by: INTERNAL MEDICINE

## 2022-05-22 PROCEDURE — 82948 REAGENT STRIP/BLOOD GLUCOSE: CPT

## 2022-05-22 RX ADMIN — ENOXAPARIN SODIUM 30 MG: 30 INJECTION SUBCUTANEOUS at 21:41

## 2022-05-22 RX ADMIN — METOPROLOL TARTRATE 25 MG: 25 TABLET, FILM COATED ORAL at 21:41

## 2022-05-22 RX ADMIN — LIDOCAINE 5% 1 PATCH: 700 PATCH TOPICAL at 17:16

## 2022-05-22 RX ADMIN — CLOPIDOGREL BISULFATE 75 MG: 75 TABLET ORAL at 08:30

## 2022-05-22 RX ADMIN — LEVOTHYROXINE SODIUM 112 MCG: 112 TABLET ORAL at 05:05

## 2022-05-22 RX ADMIN — FENOFIBRATE 145 MG: 145 TABLET ORAL at 08:30

## 2022-05-22 RX ADMIN — DOCUSATE SODIUM 100 MG: 100 CAPSULE, LIQUID FILLED ORAL at 08:30

## 2022-05-22 RX ADMIN — GABAPENTIN 300 MG: 300 CAPSULE ORAL at 21:41

## 2022-05-22 RX ADMIN — HYDROCODONE BITARTRATE AND ACETAMINOPHEN 1 TABLET: 5; 325 TABLET ORAL at 17:29

## 2022-05-22 RX ADMIN — TORSEMIDE 40 MG: 20 TABLET ORAL at 08:37

## 2022-05-22 RX ADMIN — GABAPENTIN 300 MG: 300 CAPSULE ORAL at 08:30

## 2022-05-22 RX ADMIN — ISOSORBIDE MONONITRATE 30 MG: 30 TABLET, EXTENDED RELEASE ORAL at 08:30

## 2022-05-22 RX ADMIN — SERTRALINE 25 MG: 25 TABLET, FILM COATED ORAL at 08:30

## 2022-05-22 RX ADMIN — SENNOSIDES A AND B 15 ML: 8.8 SYRUP ORAL at 08:30

## 2022-05-22 RX ADMIN — GABAPENTIN 300 MG: 300 CAPSULE ORAL at 17:14

## 2022-05-22 RX ADMIN — SENNOSIDES 8.6 MG: 8.6 TABLET, FILM COATED ORAL at 08:30

## 2022-05-22 RX ADMIN — METOPROLOL TARTRATE 25 MG: 25 TABLET, FILM COATED ORAL at 08:30

## 2022-05-22 RX ADMIN — DOCUSATE SODIUM 100 MG: 100 CAPSULE, LIQUID FILLED ORAL at 17:14

## 2022-05-22 RX ADMIN — ALLOPURINOL 150 MG: 300 TABLET ORAL at 08:30

## 2022-05-22 NOTE — ASSESSMENT & PLAN NOTE
No results found for: HGBA1C    Recent Labs     05/21/22  1555 05/21/22  2112 05/22/22  0714 05/22/22  1048   POCGLU 153* 96 91 142*       Blood Sugar Average: Last 72 hrs:  (P) 127 875  SSI premeal fingerstick check  Avoid hypoglycemia  On steroids

## 2022-05-22 NOTE — PLAN OF CARE
Problem: PAIN - ADULT  Goal: Verbalizes/displays adequate comfort level or baseline comfort level  Description: Interventions:  - Encourage patient to monitor pain and request assistance  - Assess pain using appropriate pain scale  - Administer analgesics based on type and severity of pain and evaluate response  - Implement non-pharmacological measures as appropriate and evaluate response  - Consider cultural and social influences on pain and pain management  - Notify physician/advanced practitioner if interventions unsuccessful or patient reports new pain  Outcome: Progressing     Problem: INFECTION - ADULT  Goal: Absence or prevention of progression during hospitalization  Description: INTERVENTIONS:  - Assess and monitor for signs and symptoms of infection  - Monitor lab/diagnostic results  - Monitor all insertion sites, i e  indwelling lines, tubes, and drains  - Monitor endotracheal if appropriate and nasal secretions for changes in amount and color  - [place] appropriate cooling/warming therapies per order  - Administer medications as ordered  - Instruct and encourage patient and family to use good hand hygiene technique  - Identify and instruct in appropriate isolation precautions for identified infection/condition  Outcome: Progressing     Problem: DISCHARGE PLANNING  Goal: Discharge to home or other facility with appropriate resources  Description: INTERVENTIONS:  - Identify barriers to discharge w/patient and caregiver  - Arrange for needed discharge resources and transportation as appropriate  - Identify discharge learning needs (meds, wound care, etc )  - Arrange for interpretive services to assist at discharge as needed  - Refer to Case Management Department for coordinating discharge planning if the patient needs post-hospital services based on physician/advanced practitioner order or complex needs related to functional status, cognitive ability, or social support system  Outcome: Progressing Problem: Knowledge Deficit  Goal: Patient/family/caregiver demonstrates understanding of disease process, treatment plan, medications, and discharge instructions  Description: Complete learning assessment and assess knowledge base  Interventions:  - Provide teaching at level of understanding  - Provide teaching via preferred learning methods  Outcome: Progressing     Problem: Nutrition/Hydration-ADULT  Goal: Nutrient/Hydration intake appropriate for improving, restoring or maintaining nutritional needs  Description: Monitor and assess patient's nutrition/hydration status for malnutrition  Collaborate with interdisciplinary team and initiate plan and interventions as ordered  Monitor patient's weight and dietary intake as ordered or per policy  Utilize nutrition screening tool and intervene as necessary  Determine patient's food preferences and provide high-protein, high-caloric foods as appropriate       INTERVENTIONS:  - Monitor oral intake, urinary output, labs, and treatment plans  - Assess nutrition and hydration status and recommend course of action  - Evaluate amount of meals eaten  - Assist patient with eating if necessary   - Allow adequate time for meals  - Recommend/ encourage appropriate diets, oral nutritional supplements, and vitamin/mineral supplements  - Order, calculate, and assess calorie counts as needed  - Recommend, monitor, and adjust tube feedings and TPN/PPN based on assessed needs  - Assess need for intravenous fluids  - Provide specific nutrition/hydration education as appropriate  - Include patient/family/caregiver in decisions related to nutrition  Outcome: Progressing     Problem: RESPIRATORY - ADULT  Goal: Achieves optimal ventilation and oxygenation  Description: INTERVENTIONS:  - Assess for changes in respiratory status  - Assess for changes in mentation and behavior  - Position to facilitate oxygenation and minimize respiratory effort  - Oxygen administered by appropriate delivery if ordered  - Initiate smoking cessation education as indicated  - Encourage broncho-pulmonary hygiene including cough, deep breathe, Incentive Spirometry  - Assess the need for suctioning and aspirate as needed  - Assess and instruct to report SOB or any respiratory difficulty  - Respiratory Therapy support as indicated  Outcome: Progressing

## 2022-05-22 NOTE — PROGRESS NOTES
[facility]  Progress Note - Chadwick Flood 1934, 80 y o  female MRN: [id number]  Unit/Bed#: [facility] 826-01 Encounter: [id number]  Primary Care Provider: Hammad Spencer DO   Date and time admitted to hospital: 5/19/2022  5:11 AM    UTI (urinary tract infection)  Assessment & Plan  Urine is positive for leukocytes  Will monitor off antibiotics for now  If has fever, wbc count elevation would start antibiotics  Lab test positive for detection of COVID-19 virus  Assessment & Plan  Patient was positive for covid on Thursday  Start treatment as patient has diabetes  Mild covid pathway   Labs obtained  Start remdesivir; if patient continues to be off oxygen, consider stopping tomorrow  Will stop dexamethasone as patient currently on room air and saturating well        Renal artery stenosis (HCC)  Assessment & Plan  Monitor blood pressure closely     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 ([facility] )  Assessment & Plan  · Baseline creatinine appears to be approximately 2 0; at baseline now  · Avoid nephrotoxic medication  · Avoid hypotension   · Monitor in and out      Essential hypertension  Assessment & Plan  Continue with metoprolol 25 mg bid  Demodex 50 mg am and 20 mg afternoon    AZUL (obstructive sleep apnea)  Assessment & Plan  Monitor closely might need CPAP at night      DM type 2 (diabetes mellitus, type 2) ([facility] )  Assessment & Plan  No results found for: HGBA1C    Recent Labs     05/21/22  1555 05/21/22  2112 05/22/22  0714 05/22/22  1048   POCGLU 153* 96 91 142*       Blood Sugar Average: Last 72 hrs:  (P) 127 875  SSI premeal fingerstick check  Avoid hypoglycemia  On steroids  Hypothyroidism  Assessment & Plan  Continue levothyroxine 112 mg daily     * Fall  Assessment & Plan  · Patient was unwitnessed fall at home  · Work up in ED no spine and brain injury  · Radiology contacted regarding knee arthoplasty is displaced     · Orthopedics consulted - no surgical needs at this time  · Fall precautions; PT/OT  · Unclear history; but 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 versus orthostasis in the setting of COVID pneumonia; similar incidents before, has been prescribed compression stockings in the outpatient setting  · No further workup needed      VTE Pharmacologic Prophylaxis:   Pharmacologic: Enoxaparin (Lovenox)  Mechanical VTE Prophylaxis in Place: Yes    Patient Centered Rounds: I have performed bedside rounds with nursing staff today  Discussions with Specialists or Other Care Team Provider:     Education and Discussions with Family / Patient: Discussed treatment plan with family and patient who agree with current plan; encouraged to ask questions and participate  Time Spent for Care: 45 minutes  More than 50% of total time spent on counseling and coordination of care as described above  Current Length of Stay: 1 day(s)    Current Patient Status: Inpatient   Certification Statement: The patient will continue to require additional inpatient hospital stay due to Awaiting discharge to rehab versus home with home health    Discharge Plan: To be determined, possibly tomorrow    Code Status: Level 1 - Full Code      Subjective:   Patient seen examined bedside, no acute distress or discomfort noted  Patient SpO2 97% on room air  Patient stable for discharge home and awaiting family decision on rehab versus home with home PT/OT  Will discuss with family moving forward  Objective:     Vitals:   Temp (24hrs), Av 3 °F (36 8 °C), Min:97 9 °F (36 6 °C), Max:98 7 °F (37 1 °C)    Temp:  [97 9 °F (36 6 °C)-98 7 °F (37 1 °C)] 97 9 °F (36 6 °C)  HR:  [64-72] 72  Resp:  [18-20] 18  BP: (118-130)/(62-68) 118/68  SpO2:  [97 %-99 %] 99 %  Body mass index is 31 71 kg/m²  Input and Output Summary (last 24 hours):        Intake/Output Summary (Last 24 hours) at 2022 1443  Last data filed at 2022 0401  Gross per 24 hour   Intake --   Output 1400 ml   Net -1400 ml       Physical Exam:     Physical Exam  Vitals and nursing note reviewed  Constitutional:       General: She is not in acute distress  Appearance: She is well-developed  HENT:      Head: Normocephalic and atraumatic  Eyes:      Conjunctiva/sclera: Conjunctivae normal    Cardiovascular:      Rate and Rhythm: Normal rate  Heart sounds: No murmur heard  Pulmonary:      Effort: Pulmonary effort is normal  No respiratory distress  Abdominal:      Palpations: Abdomen is soft  Tenderness: There is no abdominal tenderness  Musculoskeletal:      Cervical back: Neck supple  Skin:     General: Skin is warm and dry  Neurological:      Mental Status: She is alert  Psychiatric:         Behavior: Behavior normal            Additional Data:     Labs:    Results from last 7 days   Lab Units 05/21/22  0829   WBC Thousand/uL 6 80   HEMOGLOBIN g/dL 9 9*   HEMATOCRIT % 31 6*   PLATELETS Thousands/uL 245   NEUTROS PCT % 62   LYMPHS PCT % 29   MONOS PCT % 7   EOS PCT % 2     Results from last 7 days   Lab Units 05/21/22  1759 05/21/22  0630 05/20/22  0603   SODIUM mmol/L 135*   < > 136   POTASSIUM mmol/L 4 3   < > 4 1   CHLORIDE mmol/L 99*   < > 103   CO2 mmol/L 28   < > 26   BUN mg/dL 70*   < > 55*   CREATININE mg/dL 2 08*   < > 1 92*   ANION GAP mmol/L 8   < > 7   CALCIUM mg/dL 9 4   < > 8 9   ALBUMIN g/dL  --   --  2 7*   TOTAL BILIRUBIN mg/dL  --   --  0 45   ALK PHOS U/L  --   --  39*   ALT U/L  --   --  17   AST U/L  --   --  26   GLUCOSE RANDOM mg/dL 91   < > 146*    < > = values in this interval not displayed  Results from last 7 days   Lab Units 05/22/22  1048 05/22/22  0714 05/21/22  2112 05/21/22  1555 05/21/22  1252 05/21/22  0735 05/20/22  1723 05/19/22  1835   POC GLUCOSE mg/dl 142* 91 96 153* 130 82 133 196*         Results from last 7 days   Lab Units 05/19/22  1953   PROCALCITONIN ng/ml 0 20           * I Have Reviewed All Lab Data Listed Above    * Additional Pertinent Lab Tests Reviewed:  All Labs Within Last 24 Hours Reviewed    Imaging:    Imaging Reports Reviewed Today Include:   Imaging Personally Reviewed by Myself Includes:      Recent Cultures (last 7 days):           Last 24 Hours Medication List:   Current Facility-Administered Medications   Medication Dose Route Frequency Provider Last Rate    acetaminophen  650 mg Oral Q6H PRN Gerard Gregorio MD      albuterol  2 puff Inhalation Q4H PRN Gerard Gregorio MD      allopurinol  150 mg Oral Daily Gerard Gregorio MD      calcium carbonate  1,000 mg Oral Daily PRN Gerard Gregorio MD      clopidogrel  75 mg Oral Daily Gerard Gregorio MD      docusate sodium  100 mg Oral BID Gerard Gregorio MD      enoxaparin  30 mg Subcutaneous Daily Gerard Gregorio MD      fenofibrate  145 mg Oral Daily Gerard Gregorio MD      gabapentin  300 mg Oral TID Gerard Gregorio MD      HYDROcodone-acetaminophen  1 tablet Oral Q6H PRN Gerard Gregorio MD      insulin lispro  4-20 Units Subcutaneous TID AC Gerard Gregorio MD      isosorbide mononitrate  30 mg Oral Daily Gerard Gregorio MD      levothyroxine  112 mcg Oral Early Morning Gerard Gregorio MD      lidocaine  1 patch Topical Daily Gerard Gregorio MD      methocarbamol  750 mg Intravenous Once Gerard Gregorio MD      methocarbamol  750 mg Oral Q6H PRN Gerard Gregorio MD      metoprolol tartrate  25 mg Oral Q12H [facility] Gerard Gregorio MD      multivitamin with iron-minerals  15 mL Oral Daily Gerard Gregorio MD      nitroglycerin  0 4 mg Sublingual Q5 Min PRN Gerard Gregorio MD      ondansetron  4 mg Intravenous Q6H PRN Gerard Gregorio MD      senna  1 tablet Oral Daily Gerard Gregorio MD      sertraline  25 mg Oral Daily Gerard Gregorio MD      torsemide  40 mg Oral Daily Gerard Gregorio MD          Today, Patient Was Seen By: Enedelia Mandel MD    ** Please Note: Dictation voice to text software may have been used in the creation of this document   **

## 2022-05-22 NOTE — ASSESSMENT & PLAN NOTE
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

## 2022-05-23 LAB
GLUCOSE SERPL-MCNC: 111 MG/DL (ref 65–140)
GLUCOSE SERPL-MCNC: 145 MG/DL (ref 65–140)
GLUCOSE SERPL-MCNC: 155 MG/DL (ref 65–140)
GLUCOSE SERPL-MCNC: 169 MG/DL (ref 65–140)

## 2022-05-23 PROCEDURE — 82948 REAGENT STRIP/BLOOD GLUCOSE: CPT

## 2022-05-23 PROCEDURE — 99232 SBSQ HOSP IP/OBS MODERATE 35: CPT | Performed by: INTERNAL MEDICINE

## 2022-05-23 PROCEDURE — 97116 GAIT TRAINING THERAPY: CPT

## 2022-05-23 PROCEDURE — 97530 THERAPEUTIC ACTIVITIES: CPT

## 2022-05-23 PROCEDURE — 97535 SELF CARE MNGMENT TRAINING: CPT

## 2022-05-23 RX ADMIN — CLOPIDOGREL BISULFATE 75 MG: 75 TABLET ORAL at 08:54

## 2022-05-23 RX ADMIN — DOCUSATE SODIUM 100 MG: 100 CAPSULE, LIQUID FILLED ORAL at 16:53

## 2022-05-23 RX ADMIN — METOPROLOL TARTRATE 25 MG: 25 TABLET, FILM COATED ORAL at 21:22

## 2022-05-23 RX ADMIN — ENOXAPARIN SODIUM 30 MG: 30 INJECTION SUBCUTANEOUS at 21:22

## 2022-05-23 RX ADMIN — SENNOSIDES A AND B 15 ML: 8.8 SYRUP ORAL at 08:57

## 2022-05-23 RX ADMIN — GABAPENTIN 300 MG: 300 CAPSULE ORAL at 21:22

## 2022-05-23 RX ADMIN — FENOFIBRATE 145 MG: 145 TABLET ORAL at 08:56

## 2022-05-23 RX ADMIN — LIDOCAINE 5% 1 PATCH: 700 PATCH TOPICAL at 16:52

## 2022-05-23 RX ADMIN — GABAPENTIN 300 MG: 300 CAPSULE ORAL at 16:52

## 2022-05-23 RX ADMIN — DOCUSATE SODIUM 100 MG: 100 CAPSULE, LIQUID FILLED ORAL at 08:56

## 2022-05-23 RX ADMIN — METOPROLOL TARTRATE 25 MG: 25 TABLET, FILM COATED ORAL at 08:54

## 2022-05-23 RX ADMIN — INSULIN LISPRO 4 UNITS: 100 INJECTION, SOLUTION INTRAVENOUS; SUBCUTANEOUS at 13:06

## 2022-05-23 RX ADMIN — GABAPENTIN 300 MG: 300 CAPSULE ORAL at 08:56

## 2022-05-23 RX ADMIN — ALLOPURINOL 150 MG: 300 TABLET ORAL at 08:56

## 2022-05-23 RX ADMIN — ACETAMINOPHEN 650 MG: 325 TABLET, FILM COATED ORAL at 22:10

## 2022-05-23 RX ADMIN — SERTRALINE 25 MG: 25 TABLET, FILM COATED ORAL at 08:56

## 2022-05-23 RX ADMIN — SENNOSIDES 8.6 MG: 8.6 TABLET, FILM COATED ORAL at 08:57

## 2022-05-23 RX ADMIN — TORSEMIDE 40 MG: 20 TABLET ORAL at 08:57

## 2022-05-23 RX ADMIN — LEVOTHYROXINE SODIUM 112 MCG: 112 TABLET ORAL at 06:18

## 2022-05-23 RX ADMIN — ISOSORBIDE MONONITRATE 30 MG: 30 TABLET, EXTENDED RELEASE ORAL at 08:56

## 2022-05-23 NOTE — PROGRESS NOTES
[facility]  Progress Note - [place] 1934, 80 y o  female MRN: [id number]  Unit/Bed#: [facility] 826-01 Encounter: [id number]  Primary Care Provider: Ruy Rincon DO   Date and time admitted to hospital: 5/19/2022  5:11 AM    UTI (urinary tract infection)  Assessment & Plan  Urine is positive for leukocytes  Will monitor off antibiotics for now  If has fever, wbc count elevation would start antibiotics  Lab test positive for detection of COVID-19 virus  Assessment & Plan  Patient was positive for covid on Thursday  Start treatment as patient has diabetes  Mild covid pathway   Labs obtained  Start remdesivir; if patient continues to be off oxygen, consider stopping tomorrow  Will stop dexamethasone as patient currently on room air and saturating well        Renal artery stenosis (HCC)  Assessment & Plan  Monitor blood pressure closely     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 ([facility] 75 )  Assessment & Plan  · Baseline creatinine appears to be approximately 2 0; at baseline now  · Avoid nephrotoxic medication  · Avoid hypotension   · Monitor in and out      Essential hypertension  Assessment & Plan  Continue with metoprolol 25 mg bid and Imdur 30 mg daily  Demodex 40 mg afternoon      DM type 2 (diabetes mellitus, type 2) ([facility] 75 )  Assessment & Plan  No results found for: HGBA1C    Recent Labs     05/22/22  1048 05/22/22  1624 05/23/22  0814 05/23/22  1118   POCGLU 142* 108 111 155*       Blood Sugar Average: Last 72 hrs:  (P) 120 1  SSI premeal fingerstick check  Avoid hypoglycemia  On steroids  Hypothyroidism  Assessment & Plan  Continue levothyroxine 112 mg daily     * Fall  Assessment & Plan  · Patient was unwitnessed fall at home  · Work up in ED no spine and brain injury  · Radiology contacted regarding knee arthoplasty is displaced     · Orthopedics consulted - no surgical needs at this time  · Fall precautions; PT/OT  · Unclear history; but 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 versus orthostasis in the setting of COVID pneumonia; similar incidents before, has been prescribed compression stockings in the outpatient setting  · No further workup needed        VTE Pharmacologic Prophylaxis:   Pharmacologic: Enoxaparin (Lovenox)  Mechanical VTE Prophylaxis in Place: Yes    Patient Centered Rounds: I have performed bedside rounds with nursing staff today  Discussions with Specialists or Other Care Team Provider:     Education and Discussions with Family / Patient: Discussed treatment plan with family and patient who agree with current plan; encouraged to ask questions and participate  Time Spent for Care: 30 minutes  More than 50% of total time spent on counseling and coordination of care as described above  Current Length of Stay: 2 day(s)    Current Patient Status: Inpatient   Certification Statement: The patient will continue to require additional inpatient hospital stay due to Awaiting placement versus home with home health    Discharge Plan: To be determined, likely tomorrow    Code Status: Level 1 - Full Code      Subjective:   Patient seen examined bedside, no acute distress or discomfort noted  Patient stable for discharge home and recommendations are for rehab  Patient's family accepting of acute rehab, but telling case management they would rather her come home if acute rehab does not accept her  Await final determination  Day 4 of COVID isolation, labs and vital stable  Objective:     Vitals:   Temp (24hrs), Av 6 °F (37 °C), Min:97 2 °F (36 2 °C), Max:100 1 °F (37 8 °C)    Temp:  [97 2 °F (36 2 °C)-100 1 °F (37 8 °C)] 97 2 °F (36 2 °C)  HR:  [67-68] 67  Resp:  [16-18] 18  BP: (132-151)/(61-63) 151/62  Body mass index is 31 71 kg/m²  Input and Output Summary (last 24 hours):        Intake/Output Summary (Last 24 hours) at 2022 1540  Last data filed at 2022 1300  Gross per 24 hour   Intake 380 ml   Output 900 ml   Net -520 ml       Physical Exam:     Physical Exam  Vitals and nursing note reviewed  Constitutional:       General: She is not in acute distress  Appearance: She is well-developed  HENT:      Head: Normocephalic and atraumatic  Eyes:      Conjunctiva/sclera: Conjunctivae normal    Cardiovascular:      Rate and Rhythm: Normal rate  Heart sounds: No murmur heard  Pulmonary:      Effort: Pulmonary effort is normal  No respiratory distress  Abdominal:      Palpations: Abdomen is soft  Tenderness: There is no abdominal tenderness  Musculoskeletal:      Cervical back: Neck supple  Skin:     General: Skin is warm and dry  Neurological:      Mental Status: She is alert  Psychiatric:         Behavior: Behavior normal            Additional Data:     Labs:    Results from last 7 days   Lab Units 05/21/22  0829   WBC Thousand/uL 6 80   HEMOGLOBIN g/dL 9 9*   HEMATOCRIT % 31 6*   PLATELETS Thousands/uL 245   NEUTROS PCT % 62   LYMPHS PCT % 29   MONOS PCT % 7   EOS PCT % 2     Results from last 7 days   Lab Units 05/21/22  1759 05/21/22  0630 05/20/22  0603   SODIUM mmol/L 135*   < > 136   POTASSIUM mmol/L 4 3   < > 4 1   CHLORIDE mmol/L 99*   < > 103   CO2 mmol/L 28   < > 26   BUN mg/dL 70*   < > 55*   CREATININE mg/dL 2 08*   < > 1 92*   ANION GAP mmol/L 8   < > 7   CALCIUM mg/dL 9 4   < > 8 9   ALBUMIN g/dL  --   --  2 7*   TOTAL BILIRUBIN mg/dL  --   --  0 45   ALK PHOS U/L  --   --  39*   ALT U/L  --   --  17   AST U/L  --   --  26   GLUCOSE RANDOM mg/dL 91   < > 146*    < > = values in this interval not displayed           Results from last 7 days   Lab Units 05/23/22  1118 05/23/22  0814 05/22/22  1624 05/22/22  1048 05/22/22  0714 05/21/22  2112 05/21/22  1555 05/21/22  1252 05/21/22  0735 05/20/22  1723 05/19/22  1835   POC GLUCOSE mg/dl 155* 111 108 142* 91 96 153* 130 82 133 196*         Results from last 7 days   Lab Units 05/19/22  1953   PROCALCITONIN ng/ml 0 20           * I Have Reviewed All Lab Data Listed Above  * Additional Pertinent Lab Tests Reviewed:  All Labs Within Last 24 Hours Reviewed    Imaging:    Imaging Reports Reviewed Today Include:   Imaging Personally Reviewed by Myself Includes:      Recent Cultures (last 7 days):           Last 24 Hours Medication List:   Current Facility-Administered Medications   Medication Dose Route Frequency Provider Last Rate    acetaminophen  650 mg Oral Q6H PRN Chelita Ely MD      albuterol  2 puff Inhalation Q4H PRN Chelita Ely MD      allopurinol  150 mg Oral Daily Chelita Ely MD      calcium carbonate  1,000 mg Oral Daily PRN Chelita Ely MD      clopidogrel  75 mg Oral Daily Chelita Ely MD      docusate sodium  100 mg Oral BID Chelita Ely MD      enoxaparin  30 mg Subcutaneous Daily Chelita Ely MD      fenofibrate  145 mg Oral Daily Chelita Ely MD      gabapentin  300 mg Oral TID Chelita Ely MD      HYDROcodone-acetaminophen  1 tablet Oral Q6H PRN Chelita Ely MD      insulin lispro  4-20 Units Subcutaneous TID AC Chelita Ely MD      isosorbide mononitrate  30 mg Oral Daily Chelita Ely MD      levothyroxine  112 mcg Oral Early Morning Chelita Ely MD      lidocaine  1 patch Topical Daily Chelita Ely MD      methocarbamol  750 mg Oral Q6H PRN Chelita Ely MD      metoprolol tartrate  25 mg Oral Q12H [address] Chelita Ely MD      multivitamin with iron-minerals  15 mL Oral Daily Chelita Ely MD      nitroglycerin  0 4 mg Sublingual Q5 Min PRN Chelita Ely MD      ondansetron  4 mg Intravenous Q6H PRN Chelita Ely MD      senna  1 tablet Oral Daily Chelita Ely MD      sertraline  25 mg Oral Daily Chelita Ely MD      torsemide  40 mg Oral Daily Chelita Ely MD          Today, Patient Was Seen By: Shannon Cadet MD    ** Please Note: Dictation voice to text software may have been used in the creation of this document   **

## 2022-05-23 NOTE — ASSESSMENT & PLAN NOTE
No results found for: HGBA1C    Recent Labs     05/22/22  1048 05/22/22  1624 05/23/22  0814 05/23/22  1118   POCGLU 142* 108 111 155*       Blood Sugar Average: Last 72 hrs:  (P) 120 1  SSI premeal fingerstick check  Avoid hypoglycemia  On steroids

## 2022-05-23 NOTE — PLAN OF CARE
Problem: OCCUPATIONAL THERAPY ADULT  Goal: Performs self-care activities at highest level of function for planned discharge setting  See evaluation for individualized goals  Description: Treatment Interventions: ADL retraining, Functional transfer training, UE strengthening/ROM, Endurance training, Cognitive reorientation, Patient/family training, Equipment evaluation/education, Compensatory technique education, Continued evaluation, Energy conservation, Activityengagement  Equipment Recommended:  (vs home with skilled therapy and increased social support - pending progress)       See flowsheet documentation for full assessment, interventions and recommendations  5/23/2022 1513 by Danielle Jones OT  Outcome: Progressing  Note: Limitation: Decreased ADL status, Decreased Safe judgement during ADL, Decreased UE strength, Decreased cognition, Decreased endurance, Decreased high-level ADLs, Decreased self-care trans  Prognosis: Good  Assessment: Pt greeted bedside for OT treatment on 5/23/2022 focusing on maximizing independence with ADLs  Pt completes bed mobility with mod Ax1, functional transfers with min A, and functional mobility with min Ax1 with RW  Pt completes ADLs in bathroom: min A toileting, and min A grooming  Pt max A with LB dressing and S with eating  Limitations that impact functional performance include decreased ADL status, decreased UE ROM, decreased UE strength, decreased safe judgement during ADLs, decreased cognition, decreased endurance, decreased self care transfers, decreased high level ADLs and pain  Occupational performance areas to address ADL retraining, functional transfer training, UE strengthening/ROM, endurance training, cognitive reorientation, Pt/caregiver education, equipment evaluation/education, compensatory technique education, energy conservation and activity engagement    Pt would benefit from continued skilled OT services while in hospital to maximize independence with ADLs  Will continue to follow Pt's goals and progress  Pt would benefit from post acute rehabilitation services v  Home with increased social support and HHOT upon DC to maximize safety and independence with ADLs and functional tasks of choice  OT Discharge Recommendation: Post acute rehabilitation services (v  Dora Kumar pending increased social support)  OT - OK to Discharge: Yes    5/23/2022 1513 by Rolly Bowen OT  Note: Limitation: Decreased ADL status, Decreased Safe judgement during ADL, Decreased UE strength, Decreased cognition, Decreased endurance, Decreased high-level ADLs, Decreased self-care trans  Prognosis: Good  Assessment: Pt greeted bedside for OT treatment on 5/23/2022 focusing on maximizing independence with ADLs  Pt completes bed mobility with mod Ax1, functional transfers with min A, and functional mobility with min Ax1 with RW  Pt completes ADLs in bathroom: min A toileting, and min A grooming  Pt max A with LB dressing and S with eating  Limitations that impact functional performance include decreased ADL status, decreased UE ROM, decreased UE strength, decreased safe judgement during ADLs, decreased cognition, decreased endurance, decreased self care transfers, decreased high level ADLs and pain  Occupational performance areas to address ADL retraining, functional transfer training, UE strengthening/ROM, endurance training, cognitive reorientation, Pt/caregiver education, equipment evaluation/education, compensatory technique education, energy conservation and activity engagement   Pt would benefit from continued skilled OT services while in hospital to maximize independence with ADLs  Will continue to follow Pt's goals and progress  Pt would benefit from post acute rehabilitation services v  Home with increased social support and HHOT upon DC to maximize safety and independence with ADLs and functional tasks of choice       OT Discharge Recommendation: Post acute rehabilitation services (Leidy Alvarenga pending increased social support)  OT - OK to Discharge:  Yes

## 2022-05-23 NOTE — PLAN OF CARE
Problem: Prexisting or High Potential for Compromised Skin Integrity  Goal: Skin integrity is maintained or improved  Description: INTERVENTIONS:  - Identify patients at risk for skin breakdown  - Assess and monitor skin integrity  - Assess and monitor nutrition and hydration status  - Monitor labs   - Assess for incontinence   - Turn and reposition patient  - Assist with mobility/ambulation  - Relieve pressure over bony prominences  - Avoid friction and shearing  - Provide appropriate hygiene as needed including keeping skin clean and dry  - Evaluate need for skin moisturizer/barrier cream  - Collaborate with interdisciplinary team   - Patient/family teaching  - Consider wound care consult   Outcome: Progressing     Problem: PAIN - ADULT  Goal: Verbalizes/displays adequate comfort level or baseline comfort level  Description: Interventions:  - Encourage patient to monitor pain and request assistance  - Assess pain using appropriate pain scale  - Administer analgesics based on type and severity of pain and evaluate response  - Implement non-pharmacological measures as appropriate and evaluate response  - Consider cultural and social influences on pain and pain management  - Notify physician/advanced practitioner if interventions unsuccessful or patient reports new pain  Outcome: Progressing     Problem: INFECTION - ADULT  Goal: Absence or prevention of progression during hospitalization  Description: INTERVENTIONS:  - Assess and monitor for signs and symptoms of infection  - Monitor lab/diagnostic results  - Monitor all insertion sites, i e  indwelling lines, tubes, and drains  - Monitor endotracheal if appropriate and nasal secretions for changes in amount and color  - [place] appropriate cooling/warming therapies per order  - Administer medications as ordered  - Instruct and encourage patient and family to use good hand hygiene technique  - Identify and instruct in appropriate isolation precautions for identified infection/condition  Outcome: Progressing     Problem: DISCHARGE PLANNING  Goal: Discharge to home or other facility with appropriate resources  Description: INTERVENTIONS:  - Identify barriers to discharge w/patient and caregiver  - Arrange for needed discharge resources and transportation as appropriate  - Identify discharge learning needs (meds, wound care, etc )  - Arrange for interpretive services to assist at discharge as needed  - Refer to Case Management Department for coordinating discharge planning if the patient needs post-hospital services based on physician/advanced practitioner order or complex needs related to functional status, cognitive ability, or social support system  Outcome: Progressing

## 2022-05-23 NOTE — CASE MANAGEMENT
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 ([facility] ) 2022    Renal artery stenosis ([facility] ) 2022    Lab test positive for detection of COVID-19 virus 2022    UTI (urinary tract infection) 2022      LOS (days): 2  Geometric Mean LOS (GMLOS) (days):   Days to GMLOS:     OBJECTIVE:  Risk of Unplanned Readmission Score: 11 75         Current admission status: Inpatient   Preferred Pharmacy:   [facility], [address] [place] [address]  Phone: [phone] Fax: [phone]    Primary Care Provider: Timothy Vang DO    Primary Insurance: Kay Richard REP  Secondary Insurance:     DISCHARGE DETAILS:                 Other Referral/Resources/Interventions Provided:  Referral Comments: S/w pt's dtr, aware [facility] cannot accept  She is asking CM to get clarification of response  S/w July Douglas from [facility]  She will re-eval pt at her meeting this afternoon & reply if can they can change response & accept pt  Updated SLIM

## 2022-05-23 NOTE — PHYSICAL THERAPY NOTE
Physical Therapy Treatment Note    Patient's Name: Digna Jerome  : 22 1454   PT Last Visit   PT Visit Date 22   Note Type   Note Type Treatment for insurance authorization   Pain Assessment   Pain Assessment Tool 0-10   Pain Score 3   Pain Location/Orientation Orientation: Left; Location: Groin   Hospital Pain Intervention(s) Repositioned; Ambulation/increased activity   Restrictions/Precautions   Weight Bearing Precautions Per Order Yes   LLE Weight Bearing Per Order WBAT   Other Precautions Contact/isolation; Airborne/isolation;Cognitive; Chair Alarm; Bed Alarm; Fall Risk;Pain  (COVID-19 positive)   General   Chart Reviewed Yes   Family/Caregiver Present No   Cognition   Overall Cognitive Status WFL   Arousal/Participation Alert; Cooperative   Orientation Level Oriented X4   Following Commands Follows one step commands with increased time or repetition   Comments Pt pleasant and cooperative, requires increased time for processing, cues for safety   Bed Mobility   Supine to Sit 3  Moderate assistance   Additional items Assist x 1; Increased time required;Verbal cues   Additional Comments VC's for sequencing, difficulty with trunk control and scooting toward EOB   Transfers   Sit to Stand 4  Minimal assistance   Additional items Assist x 1; Increased time required;Verbal cues   Stand to Sit 4  Minimal assistance   Additional items Assist x 1; Increased time required;Verbal cues   Additional Comments with RW, VC's for hand placement   Ambulation/Elevation   Gait pattern Excessively slow; Short stride;Decreased foot clearance   Gait Assistance 4  Minimal assist   Additional items Assist x 1   Assistive Device Rolling walker   Distance 25 ft x1, 10 ft x1  Seated rest break between trials    VC's for proximity to RW, difficulty advancing L LE, more notable with fatigue, pt states secondary to groin pain from fall   Balance   Static Sitting Fair   Dynamic Sitting Poor +   Static Standing Fair -   Dynamic Standing Poor +   Ambulatory Poor +   Activity Tolerance   Activity Tolerance Patient limited by fatigue   Medical Staff Made Aware Co-treatment with OT given medical complexity and limited activity tolerance   Nurse Made Aware RN updated  Chair alarm engaged at end of session   Assessment   Prognosis Good   Problem List Decreased strength;Decreased endurance; Impaired balance;Decreased mobility; Decreased safety awareness;Pain   Assessment Pt able to increase ambulation distance this session, remains limited by generalized fatigue, noted with decline in gait quality  Pt very motivated to participate and improve, encouraged ambulation within room with nursing staff, pt verbalized understanding  Pt reports this is close to functional baseline, however limited by groin pain  Pt states she utilizes BSC at home  Pt would be able to manage with family assistance on first floor at home, has ramp to enter, however family is currently also sick with COVID, unclear support able to provide  Discussed with CM  Recommend IP rehab vs home with family care and HHPT pending family support  Goals   Patient Goals to walk more   STG Expiration Date 06/03/22   PT Treatment Day 1   Plan   Treatment/Interventions Functional transfer training;LE strengthening/ROM; Therapeutic exercise; Endurance training;Cognitive reorientation;Patient/family training;Equipment eval/education; Bed mobility;Gait training   Progress Progressing toward goals   PT Frequency 3-5x/wk   Recommendation   PT Discharge Recommendation Home with home health rehabilitation  (vs IP rehab pending family support)   Equipment Recommended [place] walker   Change/add to [employer]?  No   AM-PAC Basic Mobility Inpatient   Turning in Bed Without Bedrails 3   Lying on Back to Sitting on Edge of Flat Bed 2   Moving Bed to Chair 3   Standing Up From Chair 3   Walk in Room 3   Climb 3-5 Stairs 2   Basic Mobility Inpatient Raw Score 16   Basic Mobility Standardized Score 38 32   Highest Level Of Mobility   -HLM Goal 5: Stand one or more mins   JH-HLM Achieved 7: Walk 25 feet or more       Gerhardt Freed, PT, DPT, GCS

## 2022-05-23 NOTE — PLAN OF CARE
Problem: Prexisting or High Potential for Compromised Skin Integrity  Goal: Skin integrity is maintained or improved  Description: INTERVENTIONS:  - Identify patients at risk for skin breakdown  - Assess and monitor skin integrity  - Assess and monitor nutrition and hydration status  - Monitor labs   - Assess for incontinence   - Turn and reposition patient  - Assist with mobility/ambulation  - Relieve pressure over bony prominences  - Avoid friction and shearing  - Provide appropriate hygiene as needed including keeping skin clean and dry  - Evaluate need for skin moisturizer/barrier cream  - Collaborate with interdisciplinary team   - Patient/family teaching  - Consider wound care consult   Outcome: Progressing     Problem: PAIN - ADULT  Goal: Verbalizes/displays adequate comfort level or baseline comfort level  Description: Interventions:  - Encourage patient to monitor pain and request assistance  - Assess pain using appropriate pain scale  - Administer analgesics based on type and severity of pain and evaluate response  - Implement non-pharmacological measures as appropriate and evaluate response  - Consider cultural and social influences on pain and pain management  - Notify physician/advanced practitioner if interventions unsuccessful or patient reports new pain  Outcome: Progressing     Problem: INFECTION - ADULT  Goal: Absence or prevention of progression during hospitalization  Description: INTERVENTIONS:  - Assess and monitor for signs and symptoms of infection  - Monitor lab/diagnostic results  - Monitor all insertion sites, i e  indwelling lines, tubes, and drains  - Monitor endotracheal if appropriate and nasal secretions for changes in amount and color  - [place] appropriate cooling/warming therapies per order  - Administer medications as ordered  - Instruct and encourage patient and family to use good hand hygiene technique  - Identify and instruct in appropriate isolation precautions for identified infection/condition  Outcome: Progressing     Problem: DISCHARGE PLANNING  Goal: Discharge to home or other facility with appropriate resources  Description: INTERVENTIONS:  - Identify barriers to discharge w/patient and caregiver  - Arrange for needed discharge resources and transportation as appropriate  - Identify discharge learning needs (meds, wound care, etc )  - Arrange for interpretive services to assist at discharge as needed  - Refer to Case Management Department for coordinating discharge planning if the patient needs post-hospital services based on physician/advanced practitioner order or complex needs related to functional status, cognitive ability, or social support system  Outcome: Progressing

## 2022-05-23 NOTE — OCCUPATIONAL THERAPY NOTE
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 [facility])    Renal artery stenosis (HCC)    Lab test positive for detection of COVID-19 virus    UTI (urinary tract infection)              05/23/22 1459   OT Last Visit   OT Visit Date 05/23/22   Note Type   Note Type Treatment for insurance authorization   Restrictions/Precautions   Weight Bearing Precautions Per Order Yes   LLE Weight Bearing Per Order WBAT   Other Precautions (S)  Contact/isolation; Airborne/isolation; Fall Risk;Pain;Multiple lines;WBS  (COVID +)   Lifestyle   Autonomy PTA, pt reports having S for ADLs, A for IADLs, RW for fnxl mobility (-)    Reciprocal Relationships Family   Service to Others Retired   Intrinsic Gratification Adult coloring books   Pain Assessment   Pain Assessment Tool FLACC   Pain Location/Orientation Orientation: Bilateral;Location: Hip  (and groin)   Pain Rating: FLACC (Rest) - Face 0   Pain Rating: FLACC (Rest) - Legs 0   Pain Rating: FLACC (Rest) - Activity 0   Pain Rating: FLACC (Rest) - Cry 0   Pain Rating: FLACC (Rest) - Consolability 0   Score: FLACC (Rest) 0   Pain Rating: FLACC (Activity) - Face 1   Pain Rating: FLACC (Activity) - Legs 0   Pain Rating: FLACC (Activity) - Activity 0   Pain Rating: FLACC (Activity) - Cry 1   Pain Rating: FLACC (Activity) - Consolability 1   Score: FLACC (Activity) 3   ADL   Where Assessed Standing at sink   Eating Assistance 5  Supervision/Setup   Eating Deficit Setup; Increased time to complete   Grooming Assistance 4  Minimal Assistance   Grooming Deficit Setup; Denture care;Brushing hair;Wash/dry hands   LB Dressing Assistance 2  Maximal Assistance   LB Dressing Deficit Setup;Don/doff R sock; Don/doff L sock  (Pt with LHAE at home )   Toileting Assistance  4  Minimal Assistance   Toileting Deficit Setup;Supervison/safety; Increased time to complete;Grab bar use  (STD toilet with use of GB)   Toileting Comments Min A transfer, min A hygiene   Bed Mobility   Supine to Sit 3  Moderate assistance   Additional items Assist x 1; Increased time required;Verbal cues;LE management   Additional Comments Pt greeted supine in bed and left OOB in recliner chair at end of session  All needs met, call bell nearby, and bed alarm engaged  Transfers   Sit to Stand 4  Minimal assistance   Additional items Assist x 1; Increased time required;Verbal cues   Stand to Sit 4  Minimal assistance   Additional items Assist x 1; Increased time required;Verbal cues   Stand pivot 4  Minimal assistance   Additional items Assist x 1; Increased time required;Verbal cues   Toilet transfer 4  Minimal assistance   Additional items Assist x 1; Increased time required;Verbal cues;Standard toilet  (GB)   Additional Comments with RW   Functional Mobility   Functional Mobility 4  Minimal assistance   Additional Comments Min AX1 with RW- Within room distances O2 sats stable on RA   Additional items Rolling walker   Cognition   Overall Cognitive Status WFL   Arousal/Participation Alert; Cooperative   Attention Attends with cues to redirect   Orientation Level Oriented X4   Memory Within functional limits   Following Commands Follows one step commands without difficulty   Comments Pt very pleasant and cooperative during OT session  Pt requires increased time for processing and VCs for safe techniques during functional tasks of choice  Activity Tolerance   Activity Tolerance Patient limited by fatigue   Medical Staff Made Aware RN cleared/updated  Portions of tx completed with HANNA Felton 2* to Pt's medical complexity, decreased endurance, and need for dispo planning     Assessment   Assessment Pt greeted bedside for OT treatment on 5/23/2022 focusing on maximizing independence with ADLs  Pt completes bed mobility with mod Ax1, functional transfers with min A, and functional mobility with min Ax1 with RW  Pt completes ADLs in bathroom: min A toileting, and min A grooming  Pt max A with LB dressing and S with eating  Limitations that impact functional performance include decreased ADL status, decreased UE ROM, decreased UE strength, decreased safe judgement during ADLs, decreased cognition, decreased endurance, decreased self care transfers, decreased high level ADLs and pain  Occupational performance areas to address ADL retraining, functional transfer training, UE strengthening/ROM, endurance training, cognitive reorientation, Pt/caregiver education, equipment evaluation/education, compensatory technique education, energy conservation and activity engagement   Pt would benefit from continued skilled OT services while in hospital to maximize independence with ADLs  Will continue to follow Pt's goals and progress  Pt would benefit from post acute rehabilitation services v  Home with increased social support and HHOT upon DC to maximize safety and independence with ADLs and functional tasks of choice  Plan   Treatment Interventions ADL retraining;Visual perceptual retraining;Functional transfer training;UE strengthening/ROM; Endurance training;Patient/family training;Equipment evaluation/education;Cognitive reorientation; Compensatory technique education; Energy conservation; Activityengagement   Goal Expiration Date 06/04/22   OT Treatment Day 1   OT Frequency 3-5x/wk   Recommendation   OT Discharge Recommendation Post acute rehabilitation services  (juana Ro pending increased social support)   OT - OK to Discharge Yes   Additional Comments  The patient's raw score on the AM-PAC Daily Activity inpatient short form is 15, standardized score is 34 69, less than 39 4  Patients at this level are likely to benefit from discharge to post-acute rehabilitation services   Please refer to the recommendation of the Occupational Therapist for safe discharge planning     AM-PAC Daily Activity Inpatient   Lower Body Dressing 2   Bathing 2   Toileting 2   Upper Body Dressing 3   Grooming 3   Eating 3   Daily Activity Raw Score 15   Daily Activity Standardized Score (Calc for Raw Score >=11) 34 69   AM-PAC Applied Cognition Inpatient   Following a Speech/Presentation 3   Understanding Ordinary Conversation 4   Taking Medications 4   Remembering Where Things Are Placed or Put Away 4   Remembering List of 4-5 Errands 3   Taking Care of Complicated Tasks 3   Applied Cognition Raw Score 21   Applied Cognition Standardized Score 44 3       Cindi Sy MS, OTR/L

## 2022-05-23 NOTE — PLAN OF CARE
Problem: PHYSICAL THERAPY ADULT  Goal: Performs mobility at highest level of function for planned discharge setting  See evaluation for individualized goals  Description: Treatment/Interventions: Functional transfer training, LE strengthening/ROM, Elevations, Therapeutic exercise, Endurance training, Patient/family training, Equipment eval/education, Gait training, Compensatory technique education, Spoke to nursing, OT, Spoke to case management  Equipment Recommended:  (pt already owns RW)       See flowsheet documentation for full assessment, interventions and recommendations  Outcome: Progressing  Note: Prognosis: Good  Problem List: Decreased strength, Decreased endurance, Impaired balance, Decreased mobility, Decreased safety awareness, Pain  Assessment: Pt able to increase ambulation distance this session, remains limited by generalized fatigue, noted with decline in gait quality  Pt very motivated to participate and improve, encouraged ambulation within room with nursing staff, pt verbalized understanding  Pt reports this is close to functional baseline, however limited by groin pain  Pt states she utilizes BSC at home  Pt would be able to manage with family assistance on first floor at home, has ramp to enter, however family is currently also sick with COVID, unclear support able to provide  Discussed with CM  Recommend IP rehab vs home with family care and HHPT pending family support  Barriers to Discharge: Inaccessible home environment, Decreased caregiver support        PT Discharge Recommendation: Home with home health rehabilitation (vs IP rehab pending family support)          See flowsheet documentation for full assessment

## 2022-05-23 NOTE — ARC ADMISSION
Upon review of patients case with [facility] physician , patient is deemed [facility] appropriate but d/t current bed availability  [facility] unable to accommodate a bed at this  - [address] [facility] recommended at this time - CM aware

## 2022-05-23 NOTE — CASE MANAGEMENT
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 ([facility] ) 2022    Renal artery stenosis ([place] [address] ) 2022    Lab test positive for detection of COVID-19 virus 2022    UTI (urinary tract infection) 2022      LOS (days): 2  Geometric Mean LOS (GMLOS) (days):   Days to GMLOS:     OBJECTIVE:  Risk of Unplanned Readmission Score: 11 69         Current admission status: Inpatient   [facility] [address] [place] [address]  Phone: [phone] Fax: [phone]    Primary Care Provider: Dio Askew DO    Primary Insurance: [facility]  Secondary Insurance:     DISCHARGE DETAILS:    Discharge planning discussed with[de-identified] pt's daughter Elian Beyer of Choice: Yes     CM contacted family/caregiver?: Yes     Did patient/caregiver verbalize understanding of patient care needs?: Yes  Were patient/caregiver advised of the risks associated with not following Treatment Team discharge recommendations?: Yes                   Other Referral/Resources/Interventions Provided:  Referral Comments: S/w SLIM  Pt ready for discharge  States he s/w family over the weekend & they want rehab now for pt  TC to bharati Grissom [phone] to discuss  She only wants [facility] or [place] [facility]  Referrals sent  If they cannot accept, she will take pt home  She refuses SNF rehab  Updated SLIM  1230 update: TT from  acute rehab admissions  They do not have beds   TC to pt's daughter Susan Expose & updated her  She is agreeable to try [address] [facility] [facility]  Referral sent

## 2022-05-23 NOTE — UTILIZATION REVIEW
Continued Stay Review    Date: 5/22/2022                          Current Patient Class: inpatient   Current Level of Care:  Med Surg     HPI:88 y o  female initially admitted to observation status on 5/19 changed to inpatient on 5/21 due to COVID positive and started on pathway as well as s/p fall at home  Assessment/Plan: 5/22 -  No acute events overnight, sat 97% RA sat - Therapy reports rehab need, await family decision on rehab vrs home with [facility]       Vital Signs:  Date/Time Temp Pulse Resp BP SpO2 O2 Device Patient Position - Orthostatic VS   05/23/22 0036 98 6 °F (37 °C) 68 18 140/61 -- None (Room air) Lying   05/22/22 16:22:06 100 1 °F (37 8 °C) -- 16 132/63 -- -- --   05/22/22 0720 97 9 °F (36 6 °C) 72 18 118/68 -- -- --         Pertinent Labs/Diagnostic Results:       Results from last 7 days   Lab Units 05/21/22  0829 05/20/22  0603 05/19/22 1959 05/19/22  0526   WBC Thousand/uL 6 80 7 94 8 03  --    HEMOGLOBIN g/dL 9 9* 9 9* 10 0*  --    I STAT HEMOGLOBIN g/dl  --   --   --  11 9   HEMATOCRIT % 31 6* 31 4* 32 1*  --    HEMATOCRIT, ISTAT %  --   --   --  35   PLATELETS Thousands/uL 245 218 236  --    NEUTROS ABS Thousands/µL 4 21 6 43 6 90  --          Results from last 7 days   Lab Units 05/21/22  1759 05/21/22 0630 05/20/22  0603 05/19/22 1953 05/19/22  0526   SODIUM mmol/L 135* 136 136 137  --    POTASSIUM mmol/L 4 3 4 5 4 1 4 1  --    CHLORIDE mmol/L 99* 101 103 105  --    CO2 mmol/L 28 27 26 24  --    CO2, I-STAT mmol/L  --   --   --   --  30   ANION GAP mmol/L 8 8 7 8  --    BUN mg/dL 70* 68* 55* 48*  --    CREATININE mg/dL 2 08* 2 17* 1 92* 1 77*  --    EGFR ml/min/1 73sq m 20 19 22 25  --    CALCIUM mg/dL 9 4 9 1 8 9 8 8  --    CALCIUM, IONIZED, ISTAT mmol/L  --   --   --   --  1 14   MAGNESIUM mg/dL  --   --  2 6  --   --    PHOSPHORUS mg/dL  --   --  2 9  --   --      Results from last 7 days   Lab Units 05/20/22  0603 05/19/22 1953   AST U/L 26 26   ALT U/L 17 17   ALK PHOS U/L 39* 39* TOTAL PROTEIN g/dL 6 8 6 8   ALBUMIN g/dL 2 7* 2 6*   TOTAL BILIRUBIN mg/dL 0 45 0 48     Results from last 7 days   Lab Units 05/22/22  1624 05/22/22  1048 05/22/22  0714 05/21/22  2112 05/21/22  1555 05/21/22  1252 05/21/22  0735 05/20/22  1723 05/19/22  1835   POC GLUCOSE mg/dl 108 142* 91 96 153* 130 82 133 196*     Results from last 7 days   Lab Units 05/21/22  1759 05/21/22  0630 05/20/22  0603 05/19/22 1953   GLUCOSE RANDOM mg/dL 91 84 146* 139     Results from last 7 days   Lab Units 05/19/22  0526   PH, KHALIF I-STAT  7 437*   PCO2, KHALIF ISTAT mm HG 42 7   PO2, KHALIF ISTAT mm HG 25 0*   HCO3, KHALIF ISTAT mmol/L 28 8   I STAT BASE EXC mmol/L 4*   I STAT O2 SAT % 47*         Results from last 7 days   Lab Units 05/19/22  2305 05/19/22 1953   HS TNI 0HR ng/L  --  181*   HS TNI 2HR ng/L 185*  --    HSTNI D2 ng/L 4  --    HS TNI 4HR ng/L 183*  --    HSTNI D4 ng/L 2  --      Results from last 7 days   Lab Units 05/19/22 1953   D-DIMER QUANTITATIVE ug/ml FEU 3 44*         Results from last 7 days   Lab Units 05/21/22  0630   TSH 3RD GENERATON uIU/mL 3 350     Results from last 7 days   Lab Units 05/19/22 1953   PROCALCITONIN ng/ml 0 20       Results from last 7 days   Lab Units 05/19/22 1953   NT-PRO BNP pg/mL 23,264*       Results from last 7 days   Lab Units 05/20/22  0603 05/19/22 1953   CRP mg/L 121 0* 87 3*     Results from last 7 days   Lab Units 05/20/22  0605 05/19/22  0911   CLARITY UA  Clear Turbid   COLOR UA  Colorless Light Yellow   SPEC GRAV UA  1 010 1 016   PH UA  5 0 5 5   GLUCOSE UA mg/dl Negative Negative   KETONES UA mg/dl Negative Negative   BLOOD UA  Negative Moderate*   PROTEIN UA mg/dl Negative 30 (1+)*   NITRITE UA  Negative Negative   BILIRUBIN UA  Negative Negative   UROBILINOGEN UA (BE) mg/dl <2 0 <2 0   LEUKOCYTES UA  Negative Large*   WBC UA /hpf  --  Innumerable*   RBC UA /hpf  --  1-2   BACTERIA UA /hpf  --  None Seen   EPITHELIAL CELLS WET PREP /hpf  --  Occasional Medications:   Scheduled Medications:  allopurinol, 150 mg, Oral, Daily  clopidogrel, 75 mg, Oral, Daily  docusate sodium, 100 mg, Oral, BID  enoxaparin, 30 mg, Subcutaneous, Daily  fenofibrate, 145 mg, Oral, Daily  gabapentin, 300 mg, Oral, TID  insulin lispro, 4-20 Units, Subcutaneous, TID AC  isosorbide mononitrate, 30 mg, Oral, Daily  levothyroxine, 112 mcg, Oral, Early Morning  lidocaine, 1 patch, Topical, Daily  metoprolol tartrate, 25 mg, Oral, Q12H KARINE  multivitamin with iron-minerals, 15 mL, Oral, Daily  senna, 1 tablet, Oral, Daily  sertraline, 25 mg, Oral, Daily  torsemide, 40 mg, Oral, Daily      Continuous IV Infusions:     PRN Meds:  acetaminophen, 650 mg, Oral, Q6H PRN  albuterol, 2 puff, Inhalation, Q4H PRN  calcium carbonate, 1,000 mg, Oral, Daily PRN  HYDROcodone-acetaminophen, 1 tablet, Oral, Q6H PRN  methocarbamol, 750 mg, Oral, Q6H PRN  nitroglycerin, 0 4 mg, Sublingual, Q5 Min PRN  ondansetron, 4 mg, Intravenous, Q6H PRN        Discharge Plan: D     Network Utilization Review Department  ATTENTION: Please call with any questions or concerns to [phone] and carefully listen to the prompts so that you are directed to the right person  All voicemails are confidential   [facility] all requests for admission clinical reviews, approved or denied determinations and any other requests to dedicated fax number below belonging to the campus where the patient is receiving treatment   List of dedicated fax numbers for the Facilities:  [address] DENIALS (Administrative/Medical Necessity) [phone]   [address] [place] [address] (Maternity/NICU/Pediatrics) 261 [facility] [place] [address] [place] [address] [address] [place] [address] [address] [place] [address] [phone]

## 2022-05-24 LAB
ALBUMIN SERPL BCP-MCNC: 3 G/DL (ref 3.5–5)
ALP SERPL-CCNC: 56 U/L (ref 46–116)
ALT SERPL W P-5'-P-CCNC: 18 U/L (ref 12–78)
ANION GAP SERPL CALCULATED.3IONS-SCNC: 9 MMOL/L (ref 4–13)
AST SERPL W P-5'-P-CCNC: 25 U/L (ref 5–45)
BASOPHILS # BLD AUTO: 0.03 THOUSANDS/ΜL (ref 0–0.1)
BASOPHILS NFR BLD AUTO: 0 % (ref 0–1)
BILIRUB SERPL-MCNC: 0.57 MG/DL (ref 0.2–1)
BUN SERPL-MCNC: 78 MG/DL (ref 5–25)
CALCIUM ALBUM COR SERPL-MCNC: 10.2 MG/DL (ref 8.3–10.1)
CALCIUM SERPL-MCNC: 9.4 MG/DL (ref 8.3–10.1)
CHLORIDE SERPL-SCNC: 95 MMOL/L (ref 100–108)
CO2 SERPL-SCNC: 31 MMOL/L (ref 21–32)
CREAT SERPL-MCNC: 2.14 MG/DL (ref 0.6–1.3)
EOSINOPHIL # BLD AUTO: 0.12 THOUSAND/ΜL (ref 0–0.61)
EOSINOPHIL NFR BLD AUTO: 2 % (ref 0–6)
ERYTHROCYTE [DISTWIDTH] IN BLOOD BY AUTOMATED COUNT: 14.7 % (ref 11.6–15.1)
GFR SERPL CREATININE-BSD FRML MDRD: 20 ML/MIN/1.73SQ M
GLUCOSE SERPL-MCNC: 103 MG/DL (ref 65–140)
GLUCOSE SERPL-MCNC: 121 MG/DL (ref 65–140)
GLUCOSE SERPL-MCNC: 184 MG/DL (ref 65–140)
GLUCOSE SERPL-MCNC: 97 MG/DL (ref 65–140)
HCT VFR BLD AUTO: 33.6 % (ref 34.8–46.1)
HGB BLD-MCNC: 10.6 G/DL (ref 11.5–15.4)
IMM GRANULOCYTES # BLD AUTO: 0.09 THOUSAND/UL (ref 0–0.2)
IMM GRANULOCYTES NFR BLD AUTO: 1 % (ref 0–2)
LYMPHOCYTES # BLD AUTO: 2.25 THOUSANDS/ΜL (ref 0.6–4.47)
LYMPHOCYTES NFR BLD AUTO: 32 % (ref 14–44)
MAGNESIUM SERPL-MCNC: 2.2 MG/DL (ref 1.6–2.6)
MCH RBC QN AUTO: 33.4 PG (ref 26.8–34.3)
MCHC RBC AUTO-ENTMCNC: 31.5 G/DL (ref 31.4–37.4)
MCV RBC AUTO: 106 FL (ref 82–98)
MONOCYTES # BLD AUTO: 0.44 THOUSAND/ΜL (ref 0.17–1.22)
MONOCYTES NFR BLD AUTO: 6 % (ref 4–12)
NEUTROPHILS # BLD AUTO: 4.02 THOUSANDS/ΜL (ref 1.85–7.62)
NEUTS SEG NFR BLD AUTO: 59 % (ref 43–75)
NRBC BLD AUTO-RTO: 0 /100 WBCS
PHOSPHATE SERPL-MCNC: 4 MG/DL (ref 2.3–4.1)
PLATELET # BLD AUTO: 278 THOUSANDS/UL (ref 149–390)
PMV BLD AUTO: 9.4 FL (ref 8.9–12.7)
POTASSIUM SERPL-SCNC: 3.9 MMOL/L (ref 3.5–5.3)
PROT SERPL-MCNC: 7.8 G/DL (ref 6.4–8.2)
RBC # BLD AUTO: 3.17 MILLION/UL (ref 3.81–5.12)
SODIUM SERPL-SCNC: 135 MMOL/L (ref 136–145)
WBC # BLD AUTO: 6.95 THOUSAND/UL (ref 4.31–10.16)

## 2022-05-24 PROCEDURE — 85025 COMPLETE CBC W/AUTO DIFF WBC: CPT | Performed by: INTERNAL MEDICINE

## 2022-05-24 PROCEDURE — 94762 N-INVAS EAR/PLS OXIMTRY CONT: CPT

## 2022-05-24 PROCEDURE — 99232 SBSQ HOSP IP/OBS MODERATE 35: CPT | Performed by: HOSPITALIST

## 2022-05-24 PROCEDURE — 97116 GAIT TRAINING THERAPY: CPT

## 2022-05-24 PROCEDURE — 97530 THERAPEUTIC ACTIVITIES: CPT

## 2022-05-24 PROCEDURE — 80053 COMPREHEN METABOLIC PANEL: CPT | Performed by: INTERNAL MEDICINE

## 2022-05-24 PROCEDURE — 97110 THERAPEUTIC EXERCISES: CPT

## 2022-05-24 PROCEDURE — 82948 REAGENT STRIP/BLOOD GLUCOSE: CPT

## 2022-05-24 PROCEDURE — 84100 ASSAY OF PHOSPHORUS: CPT | Performed by: INTERNAL MEDICINE

## 2022-05-24 PROCEDURE — 83735 ASSAY OF MAGNESIUM: CPT | Performed by: INTERNAL MEDICINE

## 2022-05-24 RX ADMIN — ENOXAPARIN SODIUM 30 MG: 30 INJECTION SUBCUTANEOUS at 20:43

## 2022-05-24 RX ADMIN — SENNOSIDES A AND B 15 ML: 8.8 SYRUP ORAL at 09:11

## 2022-05-24 RX ADMIN — HYDROCODONE BITARTRATE AND ACETAMINOPHEN 1 TABLET: 5; 325 TABLET ORAL at 15:18

## 2022-05-24 RX ADMIN — METOPROLOL TARTRATE 25 MG: 25 TABLET, FILM COATED ORAL at 09:11

## 2022-05-24 RX ADMIN — GABAPENTIN 300 MG: 300 CAPSULE ORAL at 15:18

## 2022-05-24 RX ADMIN — LIDOCAINE 5% 1 PATCH: 700 PATCH TOPICAL at 15:18

## 2022-05-24 RX ADMIN — DOCUSATE SODIUM 100 MG: 100 CAPSULE, LIQUID FILLED ORAL at 09:11

## 2022-05-24 RX ADMIN — ALLOPURINOL 150 MG: 300 TABLET ORAL at 09:11

## 2022-05-24 RX ADMIN — CLOPIDOGREL BISULFATE 75 MG: 75 TABLET ORAL at 09:11

## 2022-05-24 RX ADMIN — SENNOSIDES 8.6 MG: 8.6 TABLET, FILM COATED ORAL at 09:11

## 2022-05-24 RX ADMIN — METOPROLOL TARTRATE 25 MG: 25 TABLET, FILM COATED ORAL at 20:43

## 2022-05-24 RX ADMIN — GABAPENTIN 300 MG: 300 CAPSULE ORAL at 09:11

## 2022-05-24 RX ADMIN — FENOFIBRATE 145 MG: 145 TABLET ORAL at 09:11

## 2022-05-24 RX ADMIN — HYDROCODONE BITARTRATE AND ACETAMINOPHEN 1 TABLET: 5; 325 TABLET ORAL at 03:58

## 2022-05-24 RX ADMIN — ISOSORBIDE MONONITRATE 30 MG: 30 TABLET, EXTENDED RELEASE ORAL at 09:11

## 2022-05-24 RX ADMIN — TORSEMIDE 40 MG: 20 TABLET ORAL at 09:11

## 2022-05-24 RX ADMIN — INSULIN LISPRO 4 UNITS: 100 INJECTION, SOLUTION INTRAVENOUS; SUBCUTANEOUS at 11:34

## 2022-05-24 RX ADMIN — LEVOTHYROXINE SODIUM 112 MCG: 112 TABLET ORAL at 03:58

## 2022-05-24 RX ADMIN — SERTRALINE 25 MG: 25 TABLET, FILM COATED ORAL at 09:11

## 2022-05-24 RX ADMIN — GABAPENTIN 300 MG: 300 CAPSULE ORAL at 20:43

## 2022-05-24 NOTE — PHYSICAL THERAPY NOTE
PHYSICAL THERAPY TREATMENT  NAME:  Sherryle Kelp  DATE: 05/24/22    AGE:   80 y o  Mrn:   [id number]  ADMIT DX:  Fall, initial encounter Sandy Ou  XXXA]  Unspecified multiple injuries, initial encounter [T07  XXXA]  COVID [U07 1]    History reviewed  No pertinent past medical history  History reviewed  No pertinent surgical history  Length Of Stay: 3     05/24/22 1228   PT Last Visit   PT Visit Date 05/24/22   Note Type   Note Type Treatment   Pain Assessment   Pain Assessment Tool 0-10   Pain Score No Pain   Restrictions/Precautions   LLE Weight Bearing Per Order WBAT   Other Precautions Airborne/isolation;Contact/isolation  (COVID-19 +)   General   Chart Reviewed Yes   Family/Caregiver Present No   Cognition   Overall Cognitive Status WFL   Arousal/Participation Alert; Cooperative   Attention Attends with cues to redirect   Orientation Level Oriented X4   Memory Within functional limits   Following Commands Follows all commands and directions without difficulty   Subjective   Subjective pt eager to get up and cleaned- found soiled- pt super motivated to work w/ PT- erquesting to get cleaned up; walk and ge tto bathroom  Bed Mobility   Supine to Sit 4  Minimal assistance   Additional items Assist x 1;HOB elevated; Increased time required;Verbal cues;LE management   Additional Comments sits EOB w/ S- is able to tolerate > 15 mins for gown change/ multiple sit<>stands for pericare/ gown changes- stnading tolerance to 4+ mins for pericare- pt able to compelte w/ single UE on RW   Transfers   Sit to Stand 4  Minimal assistance   Additional items Assist x 1; Increased time required   Stand to Sit 4  Minimal assistance   Additional items Assist x 1; Increased time required   Stand pivot 4  Minimal assistance   Additional items Assist x 1; Increased time required;Verbal cues  (ues of RW)   Toilet transfer 4  Minimal assistance   Additional items Assist x 1; Increased time required;Verbal cues; Commode  (commode over stand gloria toilet)   Additional Comments mobility w/ RW   Ambulation/Elevation   Gait pattern Excessively slow   Gait Assistance 4  Minimal assist   Assistive Device Rolling walker   Distance 10+10+25 seated rest breaks - improved LLE foot clearance w/ gait today as complared to prior session- reports less pain following yesterday's PT session   Balance   Static Sitting Fair +   Dynamic Sitting Fair   Static Standing Fair   Dynamic Standing Fair -   Ambulatory Poor +   Endurance Deficit   Endurance Deficit Yes   Endurance Deficit Description weakness LE fatigue + general fatigue   Activity Tolerance   Activity Tolerance Patient limited by fatigue   Nurse Made Aware yes- RN updaetd- pt up in recliner for lunch at conclusiong of session alarm intact and pt aware of CL   Exercises   Knee AROM Long Arc Quad Sitting;10 reps;Right;Left  (x3)   Ankle Pumps Sitting;25 reps   Marching Sitting;Standing;10 reps   Assessment   Prognosis Good   Problem List Decreased strength;Decreased range of motion;Decreased endurance; Impaired balance;Decreased mobility; Decreased coordination; Impaired judgement;Decreased safety awareness; Impaired vision;Obesity; Impaired hearing   Assessment Pt contines to progress w/ inc ambualtion distances and trials usign RW  improved gait pattern w/ less groin pain reported by pt post fall  pt w/ noted improvement in L foot clearance and step initiation using RW today  pt pleased w/ progress  gown and linen changes also performed during session w/ pt able to assist w/ pericare from standing position w/ single UE supoprt on RW- pt reports getting back to baseling  PT cont to recomemnd inpt rehab on d/c as pt does continue to require occasional Deondre and ? if family is able to safely provide on d/c given Covid -19 infections  Will continue to follow     Goals   Patient Goals to get back home   STG Expiration Date 06/03/22   PT Treatment Day 2   Plan   Treatment/Interventions ADL retraining;Functional transfer training;LE strengthening/ROM; Elevations; Therapeutic exercise; Endurance training;Cognitive reorientation;Equipment eval/education; Bed mobility;Spoke to nursing;Spoke to case management;Gait training; Compensatory technique education   Progress Progressing toward goals   PT Frequency 3-5x/wk   Recommendation   PT Discharge Recommendation   (see above)   Equipment Recommended [facility] Recommended Wheeled walker   Change/add to "[employer]? No   AM-PAC Basic Mobility Inpatient   Turning in Bed Without Bedrails 3   Lying on Back to Sitting on Edge of Flat Bed 2   Moving Bed to Chair 3   Standing Up From Chair 3   Walk in Room 3   Climb 3-5 Stairs 2   Basic Mobility Inpatient Raw Score 16   Basic Mobility Standardized Score 38 32   Highest Level Of Mobility   [facility] Goal 5: Stand one or more mins   End of Consult   Patient Position at End of Consult Bedside chair;Bed/Chair alarm activated; All needs within reach   End of Consult Comments set up for lunch     The patient's AM-PAC Basic Mobility Inpatient Short Form Raw Score is 16  A Raw score of less than or equal to 16 suggests the patient may benefit from discharge to post-acute rehabilitation services  Please also refer to the recommendation of the Physical Therapist for safe discharge planning              Niya Garcia, PT

## 2022-05-24 NOTE — CASE MANAGEMENT
[address] [facility] Admission Coordinator had telephone conversation with patient's daughter  Information on TCF services, length of stay, visitation, and unit's current Covid status provided  Per daughter, patient has received two doses of Covid vaccine  She is not up to date with vaccination  Aware patient's family is also Covid positive

## 2022-05-24 NOTE — PROGRESS NOTES
[address] [facility]  Progress Note - Ivy Harris 1934, 80 y o  female MRN: [id number]  Unit/Bed#: [facility] 826-01 Encounter: [id number]  Primary Care Provider: Eileen Ramey DO   Date and time admitted to hospital: 5/19/2022  5:11 AM    UTI (urinary tract infection)  Assessment & Plan  Urine is positive for leukocytes  Will monitor off antibiotics for now  If has fever, wbc count elevation would start antibiotics  Lab test positive for detection of COVID-19 virus  Assessment & Plan  Patient was positive for covid on 5/19/22   Started treatment as patient has diabetes  Mild covid pathway   Labs obtained  Started remdesivir; but not needed oxygen, hence stopped it  Will stop dexamethasone as patient currently on room air and saturating well        Renal artery stenosis (HCC)  Assessment & Plan  Monitor blood pressure closely     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 ([facility] )  Assessment & Plan  · Baseline creatinine appears to be approximately 2 0; at baseline now  · Avoid nephrotoxic medication  · Avoid hypotension   · Monitor in and out      Essential hypertension  Assessment & Plan  Continue with metoprolol 25 mg bid and Imdur 30 mg daily  Demodex 40 mg afternoon    AZUL (obstructive sleep apnea)  Assessment & Plan  Monitor closely might need CPAP at night      DM type 2 (diabetes mellitus, type 2) ([facility] )  Assessment & Plan  No results found for: HGBA1C    Recent Labs     05/23/22  2126 05/24/22  0822 05/24/22  1111 05/24/22  1520   POCGLU 169* 97 184* 121       Blood Sugar Average: Last 72 hrs:  (P) 127 [id number]  SSI premeal fingerstick check  Avoid hypoglycemia  On steroids  Hypothyroidism  Assessment & Plan  Continue levothyroxine 112 mg daily     * Fall  Assessment & Plan  · Patient was unwitnessed fall at home  · Work up in ED no spine and brain injury  · Radiology contacted regarding knee arthoplasty is displaced     · Orthopedics consulted - no surgical needs at this time  · Fall precautions; PT/OT  · Unclear history; but 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 versus orthostasis in the setting of COVID pneumonia; similar incidents before, has been prescribed compression stockings in the outpatient setting  · No further workup needed          VTE Pharmacologic Prophylaxis: VTE Score: 9 Moderate Risk (Score 3-4) - Pharmacological DVT Prophylaxis Ordered: enoxaparin (Lovenox)  Patient Centered Rounds: I performed bedside rounds with nursing staff today  Discussions with Specialists or Other Care Team Provider:     Education and Discussions with Family / Patient: patient  Time Spent for Care: 30 minutes  More than 50% of total time spent on counseling and coordination of care as described above  Current Length of Stay: 3 day(s)  Current Patient Status: Inpatient   Certification Statement: The patient will continue to require additional inpatient hospital stay due to working on placement  Discharge Plan: stable    Code Status: Level 1 - Full Code    Subjective:   Feels ok, neuropathy pain    Objective:     Vitals:   Temp (24hrs), Av 5 °F (36 4 °C), Min:96 9 °F (36 1 °C), Max:98 °F (36 7 °C)    Temp:  [96 9 °F (36 1 °C)-98 °F (36 7 °C)] 97 5 °F (36 4 °C)  HR:  [64] 64  Resp:  [18] 18  BP: (135-145)/(50-65) 135/65  SpO2:  [98 %] 98 %  Body mass index is 31 19 kg/m²  Input and Output Summary (last 24 hours): Intake/Output Summary (Last 24 hours) at 2022 1741  Last data filed at 2022 1436  Gross per 24 hour   Intake 340 ml   Output 600 ml   Net -260 ml       Physical Exam:   Physical Exam  Vitals reviewed  HENT:      Head: Normocephalic and atraumatic  Mouth/Throat:      Mouth: Mucous membranes are moist    Cardiovascular:      Rate and Rhythm: Normal rate and regular rhythm  Heart sounds: Normal heart sounds  Pulmonary:      Effort: Pulmonary effort is normal  No respiratory distress        Breath sounds: Normal breath sounds  No wheezing  Abdominal:      General: There is no distension  Palpations: Abdomen is soft  Tenderness: There is no abdominal tenderness  Musculoskeletal:      Right lower leg: No edema  Left lower leg: No edema  Neurological:      Mental Status: She is alert  Additional Data:     Labs:  Results from last 7 days   Lab Units 05/24/22  0430   WBC Thousand/uL 6 95   HEMOGLOBIN g/dL 10 6*   HEMATOCRIT % 33 6*   PLATELETS Thousands/uL 278   NEUTROS PCT % 59   LYMPHS PCT % 32   MONOS PCT % 6   EOS PCT % 2     Results from last 7 days   Lab Units 05/24/22  0449   SODIUM mmol/L 135*   POTASSIUM mmol/L 3 9   CHLORIDE mmol/L 95*   CO2 mmol/L 31   BUN mg/dL 78*   CREATININE mg/dL 2 14*   ANION GAP mmol/L 9   CALCIUM mg/dL 9 4   ALBUMIN g/dL 3 0*   TOTAL BILIRUBIN mg/dL 0 57   ALK PHOS U/L 56   ALT U/L 18   AST U/L 25   GLUCOSE RANDOM mg/dL 103         Results from last 7 days   Lab Units 05/24/22  1520 05/24/22  1111 05/24/22  0822 05/23/22  2126 05/23/22  1614 05/23/22  1118 05/23/22  0814 05/22/22  1624 05/22/22  1048 05/22/22  0714 05/21/22  2112 05/21/22  1555   POC GLUCOSE mg/dl 121 184* 97 169* 145* 155* 111 108 142* 91 96 153*         Results from last 7 days   Lab Units 05/19/22  1953   PROCALCITONIN ng/ml 0 20       Lines/Drains:  Invasive Devices  Report    Peripheral Intravenous Line  Duration           Peripheral IV 05/19/22 Dorsal (posterior); Left Forearm 5 days          Drain  Duration           External Urinary Catheter 4 days                      Imaging: No pertinent imaging reviewed      Recent Cultures (last 7 days):         Last 24 Hours Medication List:   Current Facility-Administered Medications   Medication Dose Route Frequency Provider Last Rate    acetaminophen  650 mg Oral Q6H PRN Areli Vargas MD      albuterol  2 puff Inhalation Q4H PRN Areli Vargas MD      allopurinol  150 mg Oral Daily Areli Vargas MD      calcium carbonate  1,000 mg Oral Daily PRN Yecenia Malone MD      clopidogrel  75 mg Oral Daily Yecenia Malone MD      docusate sodium  100 mg Oral BID Yecenia Malone MD      enoxaparin  30 mg Subcutaneous Daily Yecenia Malone MD      fenofibrate  145 mg Oral Daily Yecenia Malone, MD      gabapentin  300 mg Oral TID Yecenia Malone MD      HYDROcodone-acetaminophen  1 tablet Oral Q6H PRN Yecenia Malone MD      insulin lispro  4-20 Units Subcutaneous TID AC Yecenia Malone, MD      isosorbide mononitrate  30 mg Oral Daily Yecenia Malone MD      levothyroxine  112 mcg Oral Early Morning Yecenia Malone MD      lidocaine  1 patch Topical Daily Yecenia Malone MD      methocarbamol  750 mg Oral Q6H PRN Yecenia Malone MD      metoprolol tartrate  25 mg Oral Q12H [address] Yecenia Malone MD      multivitamin with iron-minerals  15 mL Oral Daily Yecenia Malone MD      nitroglycerin  0 4 mg Sublingual Q5 Min PRN Yecenia Malone MD      ondansetron  4 mg Intravenous Q6H PRN Yecenia Malone MD      senna  1 tablet Oral Daily Yecenia Malone MD      sertraline  25 mg Oral Daily Yecenia Malone MD      torsemide  40 mg Oral Daily Yecenia Malone MD          Today, Patient Was Seen By: Sandi Fontanez MD    **Please Note: This note may have been constructed using a voice recognition system  **

## 2022-05-24 NOTE — PLAN OF CARE
Problem: PHYSICAL THERAPY ADULT  Goal: Performs mobility at highest level of function for planned discharge setting  See evaluation for individualized goals  Description: Treatment/Interventions: Functional transfer training, LE strengthening/ROM, Elevations, Therapeutic exercise, Endurance training, Patient/family training, Equipment eval/education, Gait training, Compensatory technique education, Spoke to nursing, OT, Spoke to case management  Equipment Recommended:  (pt already owns RW)       See flowsheet documentation for full assessment, interventions and recommendations  Note: Prognosis: Good  Problem List: Decreased strength, Decreased range of motion, Decreased endurance, Impaired balance, Decreased mobility, Decreased coordination, Impaired judgement, Decreased safety awareness, Impaired vision, Obesity, Impaired hearing  Assessment: Pt contines to progress w/ inc ambualtion distances and trials usign RW  improved gait pattern w/ less groin pain reported by pt post fall  pt w/ noted improvement in L foot clearance and step initiation using RW today  pt pleased w/ progress  gown and linen changes also performed during session w/ pt able to assist w/ pericare from standing position w/ single UE supoprt on RW- pt reports getting back to baseling  PT cont to recomemnd inpt rehab on d/c as pt does continue to require occasional Deondre and ? if family is able to safely provide on d/c given Covid -19 infections  Will continue to follow  Barriers to Discharge: Inaccessible home environment, Decreased caregiver support        PT Discharge Recommendation:  (see above)          See flowsheet documentation for full assessment

## 2022-05-24 NOTE — ASSESSMENT & PLAN NOTE
No results found for: HGBA1C    Recent Labs     05/23/22  2126 05/24/22  0822 05/24/22  1111 05/24/22  1520   POCGLU 169* 97 184* 121       Blood Sugar Average: Last 72 hrs:  (P) 127 [id number]  SSI premeal fingerstick check  Avoid hypoglycemia  On steroids

## 2022-05-24 NOTE — ASSESSMENT & PLAN NOTE
Patient was positive for covid on 5/19/22   Started treatment as patient has diabetes  Mild covid pathway   Labs obtained  Started remdesivir; but not needed oxygen, hence stopped it  Will stop dexamethasone as patient currently on room air and saturating well

## 2022-05-24 NOTE — PLAN OF CARE
Problem: Prexisting or High Potential for Compromised Skin Integrity  Goal: Skin integrity is maintained or improved  Description: INTERVENTIONS:  - Identify patients at risk for skin breakdown  - Assess and monitor skin integrity  - Assess and monitor nutrition and hydration status  - Monitor labs   - Assess for incontinence   - Turn and reposition patient  - Assist with mobility/ambulation  - Relieve pressure over bony prominences  - Avoid friction and shearing  - Provide appropriate hygiene as needed including keeping skin clean and dry  - Evaluate need for skin moisturizer/barrier cream  - Collaborate with interdisciplinary team   - Patient/family teaching  - Consider wound care consult   Outcome: Progressing     Problem: PAIN - ADULT  Goal: Verbalizes/displays adequate comfort level or baseline comfort level  Description: Interventions:  - Encourage patient to monitor pain and request assistance  - Assess pain using appropriate pain scale  - Administer analgesics based on type and severity of pain and evaluate response  - Implement non-pharmacological measures as appropriate and evaluate response  - Consider cultural and social influences on pain and pain management  - Notify physician/advanced practitioner if interventions unsuccessful or patient reports new pain  Outcome: Progressing     Problem: Nutrition/Hydration-ADULT  Goal: Nutrient/Hydration intake appropriate for improving, restoring or maintaining nutritional needs  Description: Monitor and assess patient's nutrition/hydration status for malnutrition  Collaborate with interdisciplinary team and initiate plan and interventions as ordered  Monitor patient's weight and dietary intake as ordered or per policy  Utilize nutrition screening tool and intervene as necessary  Determine patient's food preferences and provide high-protein, high-caloric foods as appropriate       INTERVENTIONS:  - Monitor oral intake, urinary output, labs, and treatment plans  - Assess nutrition and hydration status and recommend course of action  - Evaluate amount of meals eaten  - Assist patient with eating if necessary   - Allow adequate time for meals  - Recommend/ encourage appropriate diets, oral nutritional supplements, and vitamin/mineral supplements  - Order, calculate, and assess calorie counts as needed  - Recommend, monitor, and adjust tube feedings and TPN/PPN based on assessed needs  - Assess need for intravenous fluids  - Provide specific nutrition/hydration education as appropriate  - Include patient/family/caregiver in decisions related to nutrition  Outcome: Progressing     Problem: INFECTION - ADULT  Goal: Absence or prevention of progression during hospitalization  Description: INTERVENTIONS:  - Assess and monitor for signs and symptoms of infection  - Monitor lab/diagnostic results  - Monitor all insertion sites, i e  indwelling lines, tubes, and drains  - Monitor endotracheal if appropriate and nasal secretions for changes in amount and color  - [place] appropriate cooling/warming therapies per order  - Administer medications as ordered  - Instruct and encourage patient and family to use good hand hygiene technique  - Identify and instruct in appropriate isolation precautions for identified infection/condition  Outcome: Progressing     Problem: DISCHARGE PLANNING  Goal: Discharge to home or other facility with appropriate resources  Description: INTERVENTIONS:  - Identify barriers to discharge w/patient and caregiver  - Arrange for needed discharge resources and transportation as appropriate  - Identify discharge learning needs (meds, wound care, etc )  - Arrange for interpretive services to assist at discharge as needed  - Refer to Case Management Department for coordinating discharge planning if the patient needs post-hospital services based on physician/advanced practitioner order or complex needs related to functional status, cognitive ability, or social support system  Outcome: Progressing     Problem: Knowledge Deficit  Goal: Patient/family/caregiver demonstrates understanding of disease process, treatment plan, medications, and discharge instructions  Description: Complete learning assessment and assess knowledge base    Interventions:  - Provide teaching at level of understanding  - Provide teaching via preferred learning methods  Outcome: Progressing     Problem: RESPIRATORY - ADULT  Goal: Achieves optimal ventilation and oxygenation  Description: INTERVENTIONS:  - Assess for changes in respiratory status  - Assess for changes in mentation and behavior  - Position to facilitate oxygenation and minimize respiratory effort  - Oxygen administered by appropriate delivery if ordered  - Initiate smoking cessation education as indicated  - Encourage broncho-pulmonary hygiene including cough, deep breathe, Incentive Spirometry  - Assess the need for suctioning and aspirate as needed  - Assess and instruct to report SOB or any respiratory difficulty  - Respiratory Therapy support as indicated  Outcome: Progressing     Problem: METABOLIC, FLUID AND ELECTROLYTES - ADULT  Goal: Electrolytes maintained within normal limits  Description: INTERVENTIONS:  - Monitor labs and assess patient for signs and symptoms of electrolyte imbalances  - Administer electrolyte replacement as ordered  - Monitor response to electrolyte replacements, including repeat lab results as appropriate  - Instruct patient on fluid and nutrition as appropriate  Outcome: Progressing  Goal: Fluid balance maintained  Description: INTERVENTIONS:  - Monitor labs   - Monitor I/O and WT  - Instruct patient on fluid and nutrition as appropriate  - Assess for signs & symptoms of volume excess or deficit  Outcome: Progressing

## 2022-05-24 NOTE — CASE MANAGEMENT
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 ([facility] ) 2022    Renal artery stenosis ([place] [address] ) 2022    Lab test positive for detection of COVID-19 virus 2022    UTI (urinary tract infection) 2022      LOS (days): 3  Geometric Mean LOS (GMLOS) (days): 5 40  Days to GMLOS:2 5     OBJECTIVE:  Risk of Unplanned Readmission Score: 13 05         Current admission status: Inpatient   Preferred Pharmacy:   [facility] [address]  [address] [place] [address]  Phone: [phone] Fax: [phone]    Primary Care Provider: Jeremías     Primary Insurance: [facility]  Secondary Insurance:     DISCHARGE DETAILS:       Additional Comments: [facility] [address]   accepted and provided NPI information for auth  The task to CM DC support was sent via [employer] for TCU auth request and awaiting determination    CM to be available

## 2022-05-24 NOTE — CASE MANAGEMENT
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 ) 2022    Renal artery stenosis ([place] [address] ) 2022    Lab test positive for detection of COVID-19 virus 2022    UTI (urinary tract infection) 2022      LOS (days): 3  Geometric Mean LOS (GMLOS) (days): 5 40  Days to GMLOS:2 4     OBJECTIVE:  Risk of Unplanned Readmission Score: 13 05         Current admission status: Inpatient   Preferred Pharmacy:   [facility] [address], [address]  [address] [facility] [place] [address]  Phone: [phone] Fax: [phone]    Primary Care Provider: Lenny Stone DO    Primary Insurance: [facility] REP  Secondary Insurance:     [address] Number: Submitted SNF auth request to Caryle Garb, pending ref# [id number] for [facility] NPI [id number], Dr Odonnell Paget: [phone]  Clinicals attached in availity

## 2022-05-24 NOTE — CASE MANAGEMENT
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 ([facility] ) 2022    [facility] artery stenosis ([facility] ) 2022    Lab test positive for detection of COVID-19 virus 2022    UTI (urinary tract infection) 2022      LOS (days): 3  Geometric Mean LOS (GMLOS) (days): 5 40  Days to GMLOS:2 4     OBJECTIVE:  Risk of Unplanned Readmission Score: 13 05         Current admission status: Inpatient   Preferred Pharmacy:   [facility] [address], [address]  [address] [facility] [place] [address]  Phone: [phone] Fax: [phone]    Primary Care Provider: Sonia Nixon DO    Primary Insurance: [facility]  Secondary Insurance:     DISCHARGE DETAILS:       IMM Given (Date):: 22 (11:15am)     Family notified[de-identified] Daughter Litzy Erickson said she did not want a copy of IMM  Completed IMM was placed in the chart  Additional Comments: Patient was contacted via hospital phone due to Covid + status  The IMM was duscyssed by phone and then the patient asked CM to talk to her daughter Keturah Ao [phone] about IMM  Litzy Dre was contacted by phone and IMM was discussed at 11:15am   Litzy Dre said she did not want a copy of the IMM

## 2022-05-25 LAB
GLUCOSE SERPL-MCNC: 105 MG/DL (ref 65–140)
GLUCOSE SERPL-MCNC: 124 MG/DL (ref 65–140)
GLUCOSE SERPL-MCNC: 173 MG/DL (ref 65–140)

## 2022-05-25 PROCEDURE — 97535 SELF CARE MNGMENT TRAINING: CPT

## 2022-05-25 PROCEDURE — 97116 GAIT TRAINING THERAPY: CPT

## 2022-05-25 PROCEDURE — 99232 SBSQ HOSP IP/OBS MODERATE 35: CPT | Performed by: HOSPITALIST

## 2022-05-25 PROCEDURE — 82948 REAGENT STRIP/BLOOD GLUCOSE: CPT

## 2022-05-25 PROCEDURE — 97530 THERAPEUTIC ACTIVITIES: CPT

## 2022-05-25 RX ADMIN — GABAPENTIN 300 MG: 300 CAPSULE ORAL at 08:00

## 2022-05-25 RX ADMIN — CLOPIDOGREL BISULFATE 75 MG: 75 TABLET ORAL at 08:00

## 2022-05-25 RX ADMIN — METOPROLOL TARTRATE 25 MG: 25 TABLET, FILM COATED ORAL at 08:00

## 2022-05-25 RX ADMIN — LIDOCAINE 5% 1 PATCH: 700 PATCH TOPICAL at 16:51

## 2022-05-25 RX ADMIN — SERTRALINE 25 MG: 25 TABLET, FILM COATED ORAL at 08:00

## 2022-05-25 RX ADMIN — DOCUSATE SODIUM 100 MG: 100 CAPSULE, LIQUID FILLED ORAL at 16:50

## 2022-05-25 RX ADMIN — LEVOTHYROXINE SODIUM 112 MCG: 112 TABLET ORAL at 05:30

## 2022-05-25 RX ADMIN — ALLOPURINOL 150 MG: 300 TABLET ORAL at 08:00

## 2022-05-25 RX ADMIN — GABAPENTIN 300 MG: 300 CAPSULE ORAL at 22:28

## 2022-05-25 RX ADMIN — GABAPENTIN 300 MG: 300 CAPSULE ORAL at 16:50

## 2022-05-25 RX ADMIN — ACETAMINOPHEN 650 MG: 325 TABLET, FILM COATED ORAL at 22:28

## 2022-05-25 RX ADMIN — ENOXAPARIN SODIUM 30 MG: 30 INJECTION SUBCUTANEOUS at 22:27

## 2022-05-25 RX ADMIN — HYDROCODONE BITARTRATE AND ACETAMINOPHEN 1 TABLET: 5; 325 TABLET ORAL at 17:27

## 2022-05-25 RX ADMIN — METOPROLOL TARTRATE 25 MG: 25 TABLET, FILM COATED ORAL at 22:45

## 2022-05-25 RX ADMIN — INSULIN LISPRO 4 UNITS: 100 INJECTION, SOLUTION INTRAVENOUS; SUBCUTANEOUS at 10:37

## 2022-05-25 RX ADMIN — SENNOSIDES A AND B 15 ML: 8.8 SYRUP ORAL at 08:00

## 2022-05-25 RX ADMIN — TORSEMIDE 40 MG: 20 TABLET ORAL at 08:00

## 2022-05-25 RX ADMIN — DOCUSATE SODIUM 100 MG: 100 CAPSULE, LIQUID FILLED ORAL at 08:00

## 2022-05-25 RX ADMIN — SENNOSIDES 8.6 MG: 8.6 TABLET, FILM COATED ORAL at 08:00

## 2022-05-25 RX ADMIN — FENOFIBRATE 145 MG: 145 TABLET ORAL at 08:00

## 2022-05-25 RX ADMIN — HYDROCODONE BITARTRATE AND ACETAMINOPHEN 1 TABLET: 5; 325 TABLET ORAL at 00:45

## 2022-05-25 RX ADMIN — ISOSORBIDE MONONITRATE 30 MG: 30 TABLET, EXTENDED RELEASE ORAL at 08:00

## 2022-05-25 NOTE — OCCUPATIONAL THERAPY NOTE
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 [facility])    Renal artery stenosis (HCC)    Lab test positive for detection of COVID-19 virus    UTI (urinary tract infection)              05/25/22 1205   OT Last Visit   OT Visit Date 05/25/22   Note Type   Note Type Treatment   Restrictions/Precautions   Weight Bearing Precautions Per Order Yes   LLE Weight Bearing Per Order WBAT   Other Precautions (S)  Airborne/isolation;Contact/isolation; Fall Risk;Pain  (COVID +)   Lifestyle   Autonomy PTA, pt reports having S for ADLs, A for IADLs, RW for fnxl mobility (-)    Reciprocal Relationships Family   Service to Others Retired   Intrinsic Gratification Adult coloring books   Pain Assessment   Pain Assessment Tool N-PASS   Pain Location/Orientation Location: [facility]   Hospital Pain Intervention(s) Repositioned   Pain Rating: FLACC (Rest) - Face 0   Pain Rating: FLACC (Rest) - Legs 0   Pain Rating: FLACC (Rest) - Activity 0   Pain Rating: FLACC (Rest) - Cry 1   Pain Rating: FLACC (Rest) - Consolability 1   Score: FLACC (Rest) 2   Pain Rating: FLACC (Activity) - Face 1   Pain Rating: FLACC (Activity) - Legs 0   Pain Rating: FLACC (Activity) - Activity 0   Pain Rating: FLACC (Activity) - Cry 1   Pain Rating: FLACC (Activity) - Consolability 1   Score: FLACC (Activity) 3   ADL   Where Assessed Commode  (BSC over toilet and seated in chair)   Eating Assistance 5  Supervision/Setup   Eating Deficit Setup;Supervision/safety; Increased time to complete  (Assist with cutting of larger food items)   UB Bathing Assistance 3  Moderate Assistance   UB Bathing Deficit Setup;Supervision/safety; Increased time to complete; Other (Comment)  (Apply powder)   LB Bathing Assistance 3  Moderate Assistance   LB Bathing Deficit Setup;Supervision/safety; Increased time to complete; Buttocks   LB Bathing Comments Standing with RW- able to reach around towards buttocks   UB Dressing Assistance [address]; Increased time to complete; Thread LUE; Thread RUE   Toileting Assistance  4  Minimal Assistance   Toileting Deficit Setup;Supervison/safety; Increased time to complete;Grab bar use;Bedside commode;Perineal hygiene  (BSC over toilet- lowered BSC to assist with independence with toileting)   Toileting Comments min A transfer, min A hygiene   Bed Mobility   Additional Comments Pt greeted OOB in recliner chair and left OOB in recliner chair at end of session  All needs met and call bell nearby  Transfers   Sit to Stand 4  Minimal assistance   Additional items Assist x 1; Increased time required;Armrests   Stand to Sit 4  Minimal assistance   Additional items Assist x 1; Increased time required;Armrests   Toilet transfer 4  Minimal assistance   Additional items Assist x 1; Increased time required;Verbal cues; Commode  (BSC toilet)   Additional Comments with RW   Functional Mobility   Functional Mobility 4  Minimal assistance   Additional Comments Min AX1 with RW- O2 stable on RA  Pt slow with functional mobility due to c/o of groin pain  Additional items Rolling walker   Cognition   Overall Cognitive Status WFL   Arousal/Participation Alert; Cooperative   Attention Attends with cues to redirect   Orientation Level Oriented X4   Memory Within functional limits   Following Commands Follows all commands and directions without difficulty   Comments Pt very pleasant and cooperative during OT session  Activity Tolerance   Activity Tolerance Patient limited by fatigue;Patient limited by pain   Medical Staff Made Aware RN cleared/updated  Assessment   Assessment Pt greeted bedside for OT treatment on 5/25/2022 focusing on maximizing independence with ADLs   Pt completes functional transfers with min A and functional mobility with min AX1 with RW  Pt completes toileting routine in bathroom at min A  Pt completes ADL seated in chair/standing with RW: mod A with UB/LB bathing, min A with UB dressing, and S with eating  Limitations that impact functional performance include decreased ADL status, decreased UE ROM, decreased UE strength, decreased safe judgement during ADLs, decreased cognition, decreased endurance, decreased sensation, decreased self care transfers, decreased high level ADLs and pain  Occupational performance areas to address ADL retraining, functional transfer training, UE strengthening/ROM, endurance training, cognitive reorientation, Pt/caregiver education, equipment evaluation/education, compensatory technique education, energy conservation and activity engagement   Pt would benefit from continued skilled OT services while in hospital to maximize independence with ADLs  Will continue to follow Pt's goals and progress  Pt would benefit from post acute rehabilitation services v  [facility] pending increased social support upon DC to maximize safety and independence with ADLs and functional tasks of choice  Plan   Treatment Interventions ADL retraining;UE strengthening/ROM; Functional transfer training; Endurance training;Patient/family training;Cognitive reorientation;Equipment evaluation/education; Compensatory technique education; Activityengagement; Energy conservation   Goal Expiration Date 06/04/22   OT Treatment Day 2   OT Frequency 3-5x/wk   Recommendation   OT Discharge Recommendation Post acute rehabilitation services  (v  [address] [place] [address]'[place] pending increased social support)   OT - OK to Discharge Yes   Additional Comments  The patient's raw score on the AM-PAC Daily Activity inpatient short form is 15, standardized score is 34 69, less than 39 4  Patients at this level are likely to benefit from discharge to post-acute rehabilitation services   Please refer to the recommendation of the Occupational Therapist for safe discharge planning     AM-PAC Daily Activity Inpatient   Lower Body Dressing 2   Bathing 2   Toileting 2   Upper Body Dressing 3   Grooming 3   Eating 3   Daily Activity Raw Score 15   Daily Activity Standardized Score (Calc for Raw Score >=11) 34 69   AM-PAC Applied Cognition Inpatient   Following a Speech/Presentation 3   Understanding Ordinary Conversation 4   Taking Medications 4   Remembering Where Things Are Placed or Put Away 4   Remembering List of 4-5 Errands 3   Taking Care of Complicated Tasks 3   Applied Cognition Raw Score 21   Applied Cognition Standardized Score 44 3         Cindi Sy MS, OTR/L

## 2022-05-25 NOTE — ASSESSMENT & PLAN NOTE
Patient was positive for covid on 5/19/22   Started treatment as patient has diabetes  Mild covid pathway   Labs obtained  Started remdesivir; but not needed oxygen, hence stopped it  stopped dexamethasone as patient currently on room air and saturating well

## 2022-05-25 NOTE — PHYSICAL THERAPY NOTE
Physical Therapy Treatment Note    Patient's Name: Elli Rosales  : 22 1350   PT Last Visit   PT Visit Date 22   Note Type   Note Type Treatment   Pain Assessment   Pain Assessment Tool FLACC   Pain Location/Orientation Location: Groin   Hospital Pain Intervention(s) Ambulation/increased activity;Repositioned;Distraction: Music   Pain Rating: FLACC (Rest) - Face 0   Pain Rating: FLACC (Rest) - Legs 0   Pain Rating: FLACC (Rest) - Activity 0   Pain Rating: FLACC (Rest) - Cry 0   Pain Rating: FLACC (Rest) - Consolability 0   Score: FLACC (Rest) 0   Pain Rating: FLACC (Activity) - Face 0   Pain Rating: FLACC (Activity) - Legs 1   Pain Rating: FLACC (Activity) - Activity 1   Pain Rating: FLACC (Activity) - Cry 1   Pain Rating: FLACC (Activity) - Consolability 1   Score: FLACC (Activity) 4   Restrictions/Precautions   Weight Bearing Precautions Per Order Yes   LLE Weight Bearing Per Order WBAT   Other Precautions Airborne/isolation; Fall Risk;Pain  (COVID-19(+))   General   Chart Reviewed Yes   Response to Previous Treatment Patient with no complaints from previous session  Family/Caregiver Present No   Subjective   Subjective Pt agreeable to mobilize  Bed Mobility   Supine to Sit 4  Minimal assistance   Additional items Assist x 1;HOB elevated; Increased time required;Verbal cues   Sit to Supine 4  Minimal assistance   Additional items Assist x 1; Increased time required;Verbal cues;LE management   Additional Comments Pt greeted in supine  Transfers   Sit to Stand 4  Minimal assistance   Additional items Assist x 1; Increased time required;Verbal cues  (assistance required to steady RW)   Stand to Sit 4  Minimal assistance   Additional items Assist x 1; Increased time required;Verbal cues   Stand pivot 4  Minimal assistance   Additional items Assist x 1; Increased time required;Verbal cues   Additional Comments RW   Ambulation/Elevation   Gait pattern Excessively slow; Short stride; Foward flexed; Improper Weight shift   Gait Assistance   (cga)   Additional items Assist x 1;Verbal cues; Tactile cues   Assistive Device Rolling walker   Distance 20'x2   Balance   Static Sitting Fair +   Dynamic Sitting Fair   Static Standing Fair   Dynamic Standing Fair -   Ambulatory Poor +  (RW)   Endurance Deficit   Endurance Deficit Yes   Endurance Deficit Description weakness, fatigue   Activity Tolerance   Activity Tolerance Patient limited by fatigue   Assessment   Prognosis Good   Problem List Decreased strength;Decreased endurance; Impaired balance;Decreased mobility;Pain;Decreased safety awareness   Assessment Pt seen for PT session w/ focus on t/f training + gait training  Pt demonstrated progress this session w/ only steadying of RW required for sit>stand t/f + decreased assistance required for gait training  Pt continues to demonstrate excessively slow gait pattern putting her at an increased risk for falls  Continue to recommend rehab upon d/c as pt is operating below her functional baseline + ? whether family can assist due to current COVID-19 infections  Barriers to Discharge Inaccessible home environment;Decreased caregiver support   Goals   Patient Goals go to rehab   PT Treatment Day 3   Plan   Treatment/Interventions Functional transfer training;LE strengthening/ROM; Therapeutic exercise; Endurance training;Patient/family training;Equipment eval/education;Elevations;Gait training; Compensatory technique education   Progress Progressing toward goals   PT Frequency 3-5x/wk   Recommendation   PT Discharge Recommendation Post acute rehabilitation services   Equipment Recommended [facility] Recommended Wheeled walker   Change/add to luma-id?  No   AM-PAC Basic Mobility Inpatient   Turning in Bed Without Bedrails 3   Lying on Back to Sitting on Edge of Flat Bed 2   Moving Bed to Chair 3   Standing Up From Chair 3   Walk in Room 3   Climb 3-5 Stairs 2   Basic Mobility Inpatient Raw Score 16 Basic Mobility Standardized Score 38 32   Highest Level Of Mobility   -HLM Goal 5: Stand one or more mins   -HLM Achieved 6: Walk 10 steps or more   Education   Education Provided Mobility training;Assistive device   Patient Demonstrates acceptance/verbal understanding;Reinforcement needed   End of Consult   Patient Position at End of Consult Supine; All needs within reach     Jagdeep Dela Cruz, PT, DPT

## 2022-05-25 NOTE — PLAN OF CARE
Problem: PHYSICAL THERAPY ADULT  Goal: Performs mobility at highest level of function for planned discharge setting  See evaluation for individualized goals  Description: Treatment/Interventions: Functional transfer training, LE strengthening/ROM, Elevations, Therapeutic exercise, Endurance training, Patient/family training, Equipment eval/education, Gait training, Compensatory technique education, Spoke to nursing, OT, Spoke to case management  Equipment Recommended:  (pt already owns RW)       See flowsheet documentation for full assessment, interventions and recommendations  Outcome: Progressing  Note: Prognosis: Good  Problem List: Decreased strength, Decreased endurance, Impaired balance, Decreased mobility, Pain, Decreased safety awareness  Assessment: Pt seen for PT session w/ focus on t/f training + gait training  Pt demonstrated progress this session w/ only steadying of RW required for sit>stand t/f + decreased assistance required for gait training  Pt continues to demonstrate excessively slow gait pattern putting her at an increased risk for falls  Continue to recommend rehab upon d/c as pt is operating below her functional baseline + ? whether family can assist due to current COVID-19 infections  Barriers to Discharge: Inaccessible home environment, Decreased caregiver support        PT Discharge Recommendation: Post acute rehabilitation services          See flowsheet documentation for full assessment

## 2022-05-25 NOTE — CASE MANAGEMENT
Case Management Discharge Planning Note    Patient name Diana Price  [facility] 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 ([facility] ) 2022    Renal artery stenosis ([place] [address] ) 2022    Lab test positive for detection of COVID-19 virus 2022    UTI (urinary tract infection) 2022      LOS (days): 4  Geometric Mean LOS (GMLOS) (days): 5 40  Days to GMLOS:1 3     OBJECTIVE:  Risk of Unplanned Readmission Score: 13 14         Current admission status: Inpatient   Preferred Pharmacy:   [address] [facility]  [address] [place] [address]  Phone: [phone] Fax: [phone]    Primary Care Provider: Clem Bundy DO    Primary Insurance: Kimberley JOEL  Secondary Insurance:     [facility] Number: RGLCM CPSJIZPOFAGIX#[id number] Effective 22 to [facility] ([facility]#[id number])  Next Review Date: 22 Reviewer: Shaheed [facility] [phone])  [facility]#[phone]

## 2022-05-25 NOTE — PLAN OF CARE
Problem: OCCUPATIONAL THERAPY ADULT  Goal: Performs self-care activities at highest level of function for planned discharge setting  See evaluation for individualized goals  Description: Treatment Interventions: ADL retraining, Functional transfer training, UE strengthening/ROM, Endurance training, Cognitive reorientation, Patient/family training, Equipment evaluation/education, Compensatory technique education, Continued evaluation, Energy conservation, Activityengagement  Equipment Recommended:  (vs home with skilled therapy and increased social support - pending progress)       See flowsheet documentation for full assessment, interventions and recommendations  Outcome: Progressing  Note: Limitation: Decreased ADL status, Decreased Safe judgement during ADL, Decreased UE strength, Decreased cognition, Decreased endurance, Decreased high-level ADLs, Decreased self-care trans  Prognosis: Good  Assessment: Pt greeted bedside for OT treatment on 5/25/2022 focusing on maximizing independence with ADLs  Pt completes functional transfers with min A and functional mobility with min AX1 with RW  Pt completes toileting routine in bathroom at min A  Pt completes ADL seated in chair/standing with RW: mod A with UB/LB bathing, min A with UB dressing, and S with eating  Limitations that impact functional performance include decreased ADL status, decreased UE ROM, decreased UE strength, decreased safe judgement during ADLs, decreased cognition, decreased endurance, decreased sensation, decreased self care transfers, decreased high level ADLs and pain  Occupational performance areas to address ADL retraining, functional transfer training, UE strengthening/ROM, endurance training, cognitive reorientation, Pt/caregiver education, equipment evaluation/education, compensatory technique education, energy conservation and activity engagement    Pt would benefit from continued skilled OT services while in hospital to maximize independence with ADLs  Will continue to follow Pt's goals and progress  Pt would benefit from post acute rehabilitation services v  [facility]OT pending increased social support upon DC to maximize safety and independence with ADLs and functional tasks of choice  OT Discharge Recommendation: Post acute rehabilitation services (juana Daugherty pending increased social support)  OT - OK to Discharge:  Yes

## 2022-05-25 NOTE — CASE MANAGEMENT
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 [facility] 20[facility]    Renal artery stenosis ([place] [address] ) 2022    Lab test positive for detection of COVID-19 virus 2022    UTI (urinary tract infection) 2022      LOS (days): 4  Geometric Mean LOS (GMLOS) (days): 5 40  Days to GMLOS:1 2     OBJECTIVE:  Risk of Unplanned Readmission Score: 13 17         Current admission status: Inpatient   Preferred Pharmacy:   [address], [address]  [address] [place] [address]  Phone: [phone] Fax: [phone]    Primary Care Provider: Timothy Vang DO    Primary Insurance: Kay Richard REP  Secondary Insurance:     DISCHARGE DETAILS:              Other Referral/Resources/Interventions Provided:  Referral Comments: TT from July Douglas at [facility] admissions & she saw Bernabe Lou was received  Verified with  office  Petar Ross [phone]  for [address] [facility] , reviewer Aj Ramirez [phone] fax [phone]  Info placed in ecin  July Douglas asked if pt can get ther by 6pm, otherwise will need to be am  S/w dtr Jacklyn Loader, Medicare number received & placed on CMN  Transport request submitted for BLS by 6pm                 1620 update: TC to [facility] intake  They do not think transport can be arranged to get pt to facility by 6pm   Cancelled today's request, updated date on CMN & new request placed for am     SLIM agreeable to plan  TC to dtr Alexa Casarez & aware  She wants CM to call her in am with time  TT to July Douglas at [facility] & updated her  Awaiting time for BLS transport in am

## 2022-05-25 NOTE — ASSESSMENT & PLAN NOTE
No results found for: HGBA1C    Recent Labs     05/24/22  1111 05/24/22  1520 05/25/22  0704 05/25/22  1031   POCGLU 184* 121 105 173*       Blood Sugar Average: Last 72 hrs:  (P) 849 [id number]  SSI premeal fingerstick check  Avoid hypoglycemia

## 2022-05-25 NOTE — PROGRESS NOTES
[address] [facility]  Progress Note - Williams Castellon 1934, 80 y o  female MRN: [id number]  Unit/Bed#: [facility] 826-01 Encounter: [id number]  Primary Care Provider: Kayla Flores DO   Date and time admitted to hospital: 5/19/2022  5:11 AM    UTI (urinary tract infection)  Assessment & Plan  Urine is positive for leukocytes  Will monitor off antibiotics for now  If has fever, wbc count elevation would start antibiotics  Lab test positive for detection of COVID-19 virus  Assessment & Plan  Patient was positive for covid on 5/19/22   Started treatment as patient has diabetes  Mild covid pathway   Labs obtained  Started remdesivir; but not needed oxygen, hence stopped it  stopped dexamethasone as patient currently on room air and saturating well        Renal artery stenosis (HCC)  Assessment & Plan  Monitor blood pressure closely     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 ([facility] )  Assessment & Plan  · Baseline creatinine appears to be approximately 2 0; at baseline now  · Avoid nephrotoxic medication  · Avoid hypotension   · Monitor in and out      Essential hypertension  Assessment & Plan  Continue with metoprolol 25 mg bid and Imdur 30 mg daily  Demodex 40 mg afternoon    AZUL (obstructive sleep apnea)  Assessment & Plan  Monitor closely might need CPAP at night      DM type 2 (diabetes mellitus, type 2) ([facility] )  Assessment & Plan  No results found for: HGBA1C    Recent Labs     05/24/22  1111 05/24/22  1520 05/25/22  0704 05/25/22  1031   POCGLU 184* 121 105 173*       Blood Sugar Average: Last 72 hrs:  (P) 757 [id number]  SSI premeal fingerstick check  Avoid hypoglycemia    Hypothyroidism  Assessment & Plan  Continue levothyroxine 112 mg daily     * Fall  Assessment & Plan  · Patient was unwitnessed fall at home  · Work up in ED no spine and brain injury  · Radiology contacted regarding knee arthoplasty is displaced     · Orthopedics consulted - no surgical needs at this time  · Fall precautions; PT/OT  · Unclear history; but 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 versus orthostasis in the setting of COVID pneumonia; similar incidents before, has been prescribed compression stockings in the outpatient setting  · No further workup needed          VTE Pharmacologic Prophylaxis: VTE Score: 9 Moderate Risk (Score 3-4) - Pharmacological DVT Prophylaxis Ordered: enoxaparin (Lovenox)  Patient Centered Rounds: I performed bedside rounds with nursing staff today  Discussions with Specialists or Other Care Team Provider:     Education and Discussions with Family / Patient: Updated  (daughter) via phone  Time Spent for Care: 30 minutes  More than 50% of total time spent on counseling and coordination of care as described above  Current Length of Stay: 4 day(s)  Current Patient Status: Inpatient   Certification Statement: The patient will continue to require additional inpatient hospital stay due to pending insurance auth  Discharge Plan: stable for DC    Code Status: Level 1 - Full Code    Subjective:   Feels well, has no complains    Objective:     Vitals:   Temp (24hrs), Av 1 °F (36 7 °C), Min:98 °F (36 7 °C), Max:98 2 °F (36 8 °C)    Temp:  [98 °F (36 7 °C)-98 2 °F (36 8 °C)] 98 2 °F (36 8 °C)  HR:  [64-72] 72  Resp:  [16-20] 16  BP: (138-174)/(60-70) 138/60  SpO2:  [98 %] 98 %  Body mass index is 31 19 kg/m²  Input and Output Summary (last 24 hours): Intake/Output Summary (Last 24 hours) at 2022 1548  Last data filed at 2022 0550  Gross per 24 hour   Intake --   Output 300 ml   Net -300 ml       Physical Exam:   Physical Exam  Vitals reviewed  HENT:      Head: Normocephalic and atraumatic  Mouth/Throat:      Mouth: Mucous membranes are moist    Cardiovascular:      Rate and Rhythm: Normal rate and regular rhythm  Heart sounds: Normal heart sounds  Pulmonary:      Effort: Pulmonary effort is normal  No respiratory distress  Breath sounds: Normal breath sounds  No wheezing  Abdominal:      General: There is no distension  Palpations: Abdomen is soft  Tenderness: There is no abdominal tenderness  Musculoskeletal:      Right lower leg: No edema  Left lower leg: No edema  Neurological:      Mental Status: She is alert and oriented to person, place, and time  Additional Data:     Labs:  Results from last 7 days   Lab Units 05/24/22  0430   WBC Thousand/uL 6 95   HEMOGLOBIN g/dL 10 6*   HEMATOCRIT % 33 6*   PLATELETS Thousands/uL 278   NEUTROS PCT % 59   LYMPHS PCT % 32   MONOS PCT % 6   EOS PCT % 2     Results from last 7 days   Lab Units 05/24/22  0449   SODIUM mmol/L 135*   POTASSIUM mmol/L 3 9   CHLORIDE mmol/L 95*   CO2 mmol/L 31   BUN mg/dL 78*   CREATININE mg/dL 2 14*   ANION GAP mmol/L 9   CALCIUM mg/dL 9 4   ALBUMIN g/dL 3 0*   TOTAL BILIRUBIN mg/dL 0 57   ALK PHOS U/L 56   ALT U/L 18   AST U/L 25   GLUCOSE RANDOM mg/dL 103         Results from last 7 days   Lab Units 05/25/22  1031 05/25/22  0704 05/24/22  1520 05/24/22  1111 05/24/22  0822 05/23/22  2126 05/23/22  1614 05/23/22  1118 05/23/22  0814 05/22/22  1624 05/22/22  1048 05/22/22  0714   POC GLUCOSE mg/dl 173* 105 121 184* 97 169* 145* 155* 111 108 142* 91         Results from last 7 days   Lab Units 05/19/22  1953   PROCALCITONIN ng/ml 0 20       Lines/Drains:  Invasive Devices  Report    Peripheral Intravenous Line  Duration           Peripheral IV 05/19/22 Dorsal (posterior); Left Forearm 6 days          Drain  Duration           External Urinary Catheter 5 days                      Imaging: No pertinent imaging reviewed      Recent Cultures (last 7 days):         Last 24 Hours Medication List:   Current Facility-Administered Medications   Medication Dose Route Frequency Provider Last Rate    acetaminophen  650 mg Oral Q6H PRN Otf Chris MD      albuterol  2 puff Inhalation Q4H PRN Otf Chris MD      allopurinol  150 mg Oral Daily Michelle Dela Cruz MD      calcium carbonate  1,000 mg Oral Daily PRN Michelle Dela Cruz MD      clopidogrel  75 mg Oral Daily Michelle Dela Cruz MD      docusate sodium  100 mg Oral BID Michelle Dela Cruz MD      enoxaparin  30 mg Subcutaneous Daily Michelle Dela Cruz MD      fenofibrate  145 mg Oral Daily Michelle Dela Cruz MD      gabapentin  300 mg Oral TID Michelle Dela Cruz MD      HYDROcodone-acetaminophen  1 tablet Oral Q6H PRN Michelle Dela Cruz MD      insulin lispro  4-20 Units Subcutaneous TID AC Michelle Dela Cruz MD      isosorbide mononitrate  30 mg Oral Daily Michelle Dela Cruz MD      levothyroxine  112 mcg Oral Early Morning Michelle Dela Cruz MD      lidocaine  1 patch Topical Daily Michelle Dela Cruz MD      methocarbamol  750 mg Oral Q6H PRN Michelle Dela Cruz MD      metoprolol tartrate  25 mg Oral Q12H [address] Michelle Dela Cruz MD      multivitamin with iron-minerals  15 mL Oral Daily Michelle Dela Cruz MD      nitroglycerin  0 4 mg Sublingual Q5 Min PRN Michelle Dela Cruz MD      ondansetron  4 mg Intravenous Q6H PRN Michelle Dela Cruz MD      senna  1 tablet Oral Daily Michelle Dela Cruz MD      sertraline  25 mg Oral Daily Michelle Dela Cruz MD      torsemide  40 mg Oral Daily Michelle Dela Cruz MD          Today, Patient Was Seen By: Augustina Blount MD    **Please Note: This note may have been constructed using a voice recognition system  **

## 2022-05-26 ENCOUNTER — NURSING HOME VISIT (OUTPATIENT)
Dept: GERIATRICS | Facility: OTHER | Age: 87
End: 2022-05-26
Payer: COMMERCIAL

## 2022-05-26 VITALS
OXYGEN SATURATION: 99 % | HEART RATE: 56 BPM | DIASTOLIC BLOOD PRESSURE: 64 MMHG | BODY MASS INDEX: 31.12 KG/M2 | SYSTOLIC BLOOD PRESSURE: 142 MMHG | RESPIRATION RATE: 15 BRPM | HEIGHT: 59 IN | WEIGHT: 154.4 LBS | TEMPERATURE: 97.5 F

## 2022-05-26 VITALS
TEMPERATURE: 97.5 F | OXYGEN SATURATION: 95 % | HEART RATE: 63 BPM | WEIGHT: 154.32 LBS | SYSTOLIC BLOOD PRESSURE: 151 MMHG | DIASTOLIC BLOOD PRESSURE: 53 MMHG | RESPIRATION RATE: 18 BRPM | BODY MASS INDEX: 31.17 KG/M2

## 2022-05-26 DIAGNOSIS — I10 ESSENTIAL HYPERTENSION: Chronic | ICD-10-CM

## 2022-05-26 DIAGNOSIS — E11.40 CONTROLLED TYPE 2 DIABETES MELLITUS WITH DIABETIC NEUROPATHY, WITHOUT LONG-TERM CURRENT USE OF INSULIN (HCC): Chronic | ICD-10-CM

## 2022-05-26 DIAGNOSIS — G47.33 OSA (OBSTRUCTIVE SLEEP APNEA): Chronic | ICD-10-CM

## 2022-05-26 DIAGNOSIS — I70.1 RENAL ARTERY STENOSIS (HCC): Chronic | ICD-10-CM
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
## 2022-05-26 DIAGNOSIS — N18.4 STAGE 4 CHRONIC KIDNEY DISEASE (HCC): ICD-10-CM

## 2022-05-26 DIAGNOSIS — U07.1 COVID-19: Primary | ICD-10-CM

## 2022-05-26 DIAGNOSIS — E03.9 HYPOTHYROIDISM, UNSPECIFIED TYPE: Chronic | ICD-10-CM

## 2022-05-26 LAB — GLUCOSE SERPL-MCNC: 103 MG/DL (ref 65–140)

## 2022-05-26 PROCEDURE — 99239 HOSP IP/OBS DSCHRG MGMT >30: CPT | Performed by: HOSPITALIST

## 2022-05-26 PROCEDURE — 99305 1ST NF CARE MODERATE MDM 35: CPT | Performed by: FAMILY MEDICINE

## 2022-05-26 PROCEDURE — 82948 REAGENT STRIP/BLOOD GLUCOSE: CPT

## 2022-05-26 RX ORDER — ALLOPURINOL 300 MG/1
150 TABLET ORAL DAILY
Refills: 0
Start: 2022-05-26

## 2022-05-26 RX ORDER — ALBUTEROL SULFATE 90 UG/1
2 AEROSOL, METERED RESPIRATORY (INHALATION) EVERY 4 HOURS PRN
Refills: 0
Start: 2022-05-26

## 2022-05-26 RX ORDER — CLOPIDOGREL BISULFATE 75 MG/1
75 TABLET ORAL DAILY
Refills: 0
Start: 2022-05-26

## 2022-05-26 RX ORDER — METHOCARBAMOL 750 MG/1
750 TABLET, FILM COATED ORAL EVERY 6 HOURS PRN
Refills: 0
Start: 2022-05-26

## 2022-05-26 RX ORDER — ENOXAPARIN SODIUM 100 MG/ML
30 INJECTION SUBCUTANEOUS DAILY
Refills: 0
Start: 2022-05-26 | End: 2022-06-16

## 2022-05-26 RX ORDER — FENOFIBRATE 145 MG/1
145 TABLET, COATED ORAL DAILY
Refills: 0
Start: 2022-05-26

## 2022-05-26 RX ORDER — CALCIUM CARBONATE 200(500)MG
1000 TABLET,CHEWABLE ORAL DAILY PRN
Refills: 0
Start: 2022-05-26

## 2022-05-26 RX ORDER — ISOSORBIDE MONONITRATE 30 MG/1
30 TABLET, EXTENDED RELEASE ORAL DAILY
Refills: 0
Start: 2022-05-26

## 2022-05-26 RX ORDER — HYDROCODONE BITARTRATE AND ACETAMINOPHEN 5; 325 MG/1; MG/1
1 TABLET ORAL EVERY 6 HOURS PRN
Qty: 12 TABLET | Refills: 0 | Status: SHIPPED | OUTPATIENT
Start: 2022-05-26 | End: 2022-05-29

## 2022-05-26 RX ORDER — DOCUSATE SODIUM 100 MG/1
100 CAPSULE, LIQUID FILLED ORAL 2 TIMES DAILY
Refills: 0
Start: 2022-05-26

## 2022-05-26 RX ORDER — LIDOCAINE 50 MG/G
1 PATCH TOPICAL DAILY
Refills: 0
Start: 2022-05-26

## 2022-05-26 RX ORDER — SERTRALINE HYDROCHLORIDE 25 MG/1
25 TABLET, FILM COATED ORAL DAILY
Refills: 0
Start: 2022-05-26

## 2022-05-26 RX ORDER — MULTIVIT-MIN/FERROUS GLUCONATE 9 MG/15 ML
15 LIQUID (ML) ORAL DAILY
Refills: 0
Start: 2022-05-26

## 2022-05-26 RX ORDER — ACETAMINOPHEN 325 MG/1
650 TABLET ORAL EVERY 6 HOURS PRN
Refills: 0
Start: 2022-05-26

## 2022-05-26 RX ORDER — LEVOTHYROXINE SODIUM 112 UG/1
112 TABLET ORAL
Refills: 0
Start: 2022-05-27

## 2022-05-26 RX ORDER — SENNOSIDES 8.6 MG
8.6 TABLET ORAL DAILY
Refills: 0
Start: 2022-05-26

## 2022-05-26 RX ORDER — GABAPENTIN 300 MG/1
300 CAPSULE ORAL 3 TIMES DAILY
Refills: 0
Start: 2022-05-26

## 2022-05-26 RX ADMIN — HYDROCODONE BITARTRATE AND ACETAMINOPHEN 1 TABLET: 5; 325 TABLET ORAL at 01:51

## 2022-05-26 RX ADMIN — LEVOTHYROXINE SODIUM 112 MCG: 112 TABLET ORAL at 06:29

## 2022-05-26 RX ADMIN — HYDROCODONE BITARTRATE AND ACETAMINOPHEN 1 TABLET: 5; 325 TABLET ORAL at 11:01

## 2022-05-26 NOTE — ASSESSMENT & PLAN NOTE
Lab Results   Component Value Date    HGBA1C 6 0 (H) 04/02/2021   Continue diabetic diet   Continue SSI   Will continue to monitor glucose

## 2022-05-26 NOTE — ASSESSMENT & PLAN NOTE
Patient had an unwitnessed fall at home   No fractures or dislocations per orthopedics   Patient still experiences pain, will increase Gabapentin to 300mg in morning and 600mg at night   Continue physical therapy  Monitor orthostatic vital signs

## 2022-05-26 NOTE — PLAN OF CARE
Problem: Prexisting or High Potential for Compromised Skin Integrity  Goal: Skin integrity is maintained or improved  Description: INTERVENTIONS:  - Identify patients at risk for skin breakdown  - Assess and monitor skin integrity  - Assess and monitor nutrition and hydration status  - Monitor labs   - Assess for incontinence   - Turn and reposition patient  - Assist with mobility/ambulation  - Relieve pressure over bony prominences  - Avoid friction and shearing  - Provide appropriate hygiene as needed including keeping skin clean and dry  - Evaluate need for skin moisturizer/barrier cream  - Collaborate with interdisciplinary team   - Patient/family teaching  - Consider wound care consult   Outcome: Progressing     Problem: PAIN - ADULT  Goal: Verbalizes/displays adequate comfort level or baseline comfort level  Description: Interventions:  - Encourage patient to monitor pain and request assistance  - Assess pain using appropriate pain scale  - Administer analgesics based on type and severity of pain and evaluate response  - Implement non-pharmacological measures as appropriate and evaluate response  - Consider cultural and social influences on pain and pain management  - Notify physician/advanced practitioner if interventions unsuccessful or patient reports new pain  Outcome: Progressing     Problem: INFECTION - ADULT  Goal: Absence or prevention of progression during hospitalization  Description: INTERVENTIONS:  - Assess and monitor for signs and symptoms of infection  - Monitor lab/diagnostic results  - Monitor all insertion sites, i e  indwelling lines, tubes, and drains  - Monitor endotracheal if appropriate and nasal secretions for changes in amount and color  - [place] appropriate cooling/warming therapies per order  - Administer medications as ordered  - Instruct and encourage patient and family to use good hand hygiene technique  - Identify and instruct in appropriate isolation precautions for identified infection/condition  Outcome: Progressing

## 2022-05-26 NOTE — PROGRESS NOTES
[facility]    History and Physical  POS: 31    Records Reviewed include: Hospital records      Chief Complaint/ Reason for Admission:     History of Present Illness:       Ms Bethel Bhakta is an 79 yo female admitted to [address] [place] [address]  after an unwitnessed fall at home  The patient has a past medical history of CKD stage 3, HTN, DM, hypothyroidism, CVA, AZUL and renal artery stenosis  The patient tested positive for COVID on admission to the ED  Patient is currently admitted to [address] [place] [address] transitional care unit for short-term rehab The patient is currently asymptomatic and complains of no COVID related symptoms  The patient does state she has pain in her neck and feet which she describes as burning  The patient denies symptoms of fever, chills, headache, lightheadedness, weakness, chest pain, SOB, nausea vomiting and diarrhea  Goal is to continue physical therapy and return home  Allergies    Allergies   Allergen Reactions    Duloxetine Hcl Other (See Comments) and Hypertension    Duloxetine Hcl      Cymbalta;  Hemorrhagic stroke listed as reaction    Escitalopram      Other reaction(s): Urinary Retention    Pregabalin      Other reaction(s): Hypertension    Statins Myalgia    Tramadol      Other reaction(s): Hypertension    Triprolidine-Pse Other (See Comments)    Anastrozole Abdominal Pain    Anastrozole     Antihistamines, Diphenhydramine-Type     Exemestane      Aromasin; Muscle pain & cramps    Lexapro [Escitalopram]      Urinary retention    Lyrica [Pregabalin]      hypertension    Metformin      diarrhea    Oxycodone      Pt unsure      Statins      Muscle pain & cramps    Tramadol      hypertension    Triprolidine-Pseudoephedrine     Metformin Diarrhea       Past Medical History  Past Medical History:   Diagnosis Date    Arthritis     Breast cancer ([facility] ) 09/08/2015    BILATERAL    Cardiac disease     aortic valve transplant    CHF (congestive heart failure) (HCC)     Compression fracture of body of thoracic vertebra (HCC)     CVA (cerebral vascular accident) ([facility] )     Diabetes mellitus ([facility] )     Disease of thyroid gland     Fibromyalgia, primary     H/O cervical fracture 01/09/2019    Hyperlipidemia     Hypertension     Neuropathy     AZUL (obstructive sleep apnea) 10/19/2019    Pressure injury of skin     Renal disorder     kidney stent    Stroke [facility])         Past Surgical History:   Procedure Laterality Date    AORTIC VALVE SURGERY      BREAST LUMPECTOMY Right 09/08/2015    BREAST LUMPECTOMY Left 09/08/2015    BREAST SURGERY      lumpectomy for breast cancer    CATARACT EXTRACTION      CHOLECYSTECTOMY      FACIAL/NECK BIOPSY Right 9/24/2020    Procedure: EXCISION CHEEK LESION X2 WITH FROZEN SECTION;  Surgeon: Jalil Kessler DO;  Location:  MAIN OR;  Service: Plastics    HYSTERECTOMY  1978    IR THORACENTESIS  2/28/2020    JOINT REPLACEMENT      KIDNEY SURGERY      stent placed for kidney    OTHER SURGICAL HISTORY      abdominal aneurysm surgery    IL ARTHRODESIS POSTERIOR ATLAS-AXIS C1-C2 N/A 5/1/2019    Procedure: C1-C2 lateral mass fixation fusion with possible sublaminar cables;  Surgeon: Kamilah Hallman MD;  Location:  MAIN OR;  Service: Neurosurgery    REPLACEMENT TOTAL KNEE      left        Family History  Family History   Problem Relation Age of Onset    Heart disease Mother     Arthritis Mother     Diabetes Mother     Heart failure Father     Arthritis Father     Other Father         enlargement of prostate     Dementia Father     No Known Problems Daughter     No Known Problems Maternal Grandmother     No Known Problems Maternal Grandfather     No Known Problems Paternal Grandmother     No Known Problems Paternal Grandfather     No Known Problems Maternal Aunt     No Known Problems Maternal Aunt     No Known Problems Maternal Aunt     No Known Problems Maternal Aunt     No Known Problems Paternal Aunt     No Known Problems Paternal Aunt  Prostate cancer Son     Prostate cancer Son        Social History  Social History     Tobacco Use   Smoking Status Never Smoker   Smokeless Tobacco Never Used      Social History     Substance and Sexual Activity   Alcohol Use Yes      Social History     Substance and Sexual Activity   Drug Use Never        Lives: with other family member:Conjoined house with daughter  Social Support: family  Fall in the past 12 months: yes  Use of assistance Device: Rolling Walker    Physical Exam    Vital Signs    Vitals:    05/26/22 1548   BP: 151/53   Pulse: 63   Resp: 18   Temp: 97 5 °F (36 4 °C)   SpO2: 95%           Constitutional: Patient alert and in no acute distress      Physical Exam  Vitals and nursing note reviewed  Constitutional:       General: She is not in acute distress  Appearance: She is obese  She is not diaphoretic  HENT:      Head: Normocephalic and atraumatic  Eyes:      General:         Right eye: No discharge  Left eye: No discharge  Pupils: Pupils are equal, round, and reactive to light  Cardiovascular:      Rate and Rhythm: Normal rate and regular rhythm  Heart sounds: Murmur (S2>S1) heard  Pulmonary:      Effort: Pulmonary effort is normal  No respiratory distress  Breath sounds: Normal breath sounds  No wheezing  Abdominal:      Palpations: Abdomen is soft  Tenderness: There is no abdominal tenderness  There is no guarding or rebound  Musculoskeletal:      Cervical back: Normal range of motion and neck supple  Right lower leg: Edema present  Left lower leg: Edema present  Skin:     General: Skin is warm and dry  Neurological:      Mental Status: She is alert and oriented to person, place, and time  Psychiatric:         Behavior: Behavior normal          Review of Systems:  Review of Systems   Constitutional: Negative for chills and fever  HENT: Negative for congestion and rhinorrhea      Respiratory: Negative for cough, shortness of breath and wheezing  Cardiovascular: Positive for leg swelling  Negative for chest pain and palpitations  Gastrointestinal: Negative for abdominal pain and constipation  Endocrine: Negative for cold intolerance  Genitourinary: Negative for difficulty urinating, dysuria and hematuria  Musculoskeletal: Positive for gait problem  Skin: Negative for wound  Allergic/Immunologic: Negative for environmental allergies  Neurological: Negative for dizziness and seizures  Hematological: Does not bruise/bleed easily  Psychiatric/Behavioral: Negative for behavioral problems and sleep disturbance  List of Current Medications:    Medication reviewed  All orders signed  Complete list is in the paper chart  Allergies    Allergies   Allergen Reactions    Duloxetine Hcl Other (See Comments) and Hypertension    Duloxetine Hcl      Cymbalta; Hemorrhagic stroke listed as reaction    Escitalopram      Other reaction(s): Urinary Retention    Pregabalin      Other reaction(s): Hypertension    Statins Myalgia    Tramadol      Other reaction(s): Hypertension    Triprolidine-Pse Other (See Comments)    Anastrozole Abdominal Pain    Anastrozole     Antihistamines, Diphenhydramine-Type     Exemestane      Aromasin; Muscle pain & cramps    Lexapro [Escitalopram]      Urinary retention    Lyrica [Pregabalin]      hypertension    Metformin      diarrhea    Oxycodone      Pt unsure      Statins      Muscle pain & cramps    Tramadol      hypertension    Triprolidine-Pseudoephedrine     Metformin Diarrhea       Labs/Diagnostics (reviewed by this provider): I personally reviewed lab results and imaging studies  Full reports are in the paper chart       Assessment/Plan:    COVID-19  Patient in no active respiratory distress   Patient currently asymptomatic   Will continue to monitor CBC  Will continue to monitor patient for symptoms   Patient received remdesivir and dexamethasone in the hospital, discontinued due to the fact the patient did not have related symptoms  Will continue PT OT  Encourage out of bed as tolerated  Continue isolation  Provide supportive care      Fall  Patient had an unwitnessed fall at home   No fractures or dislocations per orthopedics   Patient still experiences pain, will increase Gabapentin to 300mg in morning and 600mg at night   Continue physical therapy  Monitor orthostatic vital signs    Hypothyroidism  Patient to continue Levothyroxine  Will monitor TSH     Controlled type 2 diabetes mellitus with diabetic neuropathy, without long-term current use of insulin ([facility])    Lab Results   Component Value Date    HGBA1C 6 0 (H) 04/02/2021   Continue diabetic diet   Continue SSI   Will continue to monitor glucose     AZUL (obstructive sleep apnea)  No CPAP at home   Last sleep study was aprox 5 years ago   Did not wear at home CPAP    Renal artery stenosis ([facility] )  Continue to monitor BP   Continue metoprolol       Essential hypertension  Continue metoprolol and torsemide  Continue to monitor BP   Monitor for orthostatic hypotension     Stage 4 chronic kidney disease ([facility] )  Lab Results   Component Value Date    EGFR 21 08/06/2021    EGFR 30 10/01/2020    EGFR 29 09/30/2020    CREATININE 2 06 (H) 08/06/2021    CREATININE 1 56 (H) 10/01/2020    CREATININE 1 58 (H) 09/30/2020   Avoid nephrotoxic medications and hypotension   Monitor for dehydration and ensure adequate hydration and oral intake   Monitor CMP  Continue torsemide        Pain: yes  Rehab Potential:Good  Patient Informed of Medical Condition: yes   Patient is Capable of Understanding Their Right: yes   Discharge Plan:   Vaccination:   Immunization History   Administered Date(s) Administered    COVID-19 PFIZER VACCINE 0 3 ML IM 03/31/2021, 04/21/2021    DTaP 10/08/2015    INFLUENZA 10/01/2013, 10/06/2014, 11/03/2014, 10/08/2015, 10/04/2016, 10/24/2017, 10/30/2018, 09/29/2020    Influenza Quadrivalent Preservative Free 3 years and older IM 11/03/2014, 10/08/2015    Influenza Split High Dose Preservative Free IM 10/04/2016, 10/24/2017, 10/30/2018, 09/10/2019    Influenza, seasonal, injectable 10/01/2013, 10/06/2014    Pneumococcal Conjugate 13-Valent 10/08/2015    Pneumococcal Polysaccharide PPV23 02/07/2017    Tdap 10/08/2015    Zoster 03/12/2009, 03/12/2009     Advanced Directives: yes: Yes   Code status:DNR (Per discussion with resident or POA)  PCP: DO Sheyla Sanchez MD  Geriatric Medicine  5/26/22  10:59 AM

## 2022-05-26 NOTE — CASE MANAGEMENT
Case Management Discharge Planning Note    Patient name Sonia Joaquin  Location [address] [place] [address] [id number] MRN [id number]  : 1934 Date 2022       Current Admission Date: 2022  Current Admission Diagnosis:Fall   Patient Active Problem List    Diagnosis Date Noted    Fall 2022    Hypothyroidism 2022    DM type 2 (diabetes mellitus, type 2) ([facility] ) 2022    AZUL (obstructive sleep apnea) 2022    Essential hypertension 2022    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 ([facility] ) 2022    Renal artery stenosis ([place] [address] ) 2022    Lab test positive for detection of COVID-19 virus 2022    UTI (urinary tract infection) 2022      LOS (days): 5  Geometric Mean LOS (GMLOS) (days): 5 40  Days to GMLOS:0 5     OBJECTIVE:  Risk of Unplanned Readmission Score: 13 31         Current admission status: Inpatient   Preferred Pharmacy:   [facility] [address] [place] [address]  Phone: [phone] Fax: [phone]    Primary Care Provider: Josie Kidd DO    Primary Insurance: [place] JOEL  Secondary Insurance:     DISCHARGE DETAILS:    Transport 11:45 via [facility]  Dghter Cj Suresh notified of transport time  Verified clothes she brought in yesterday were provided to pt                                                    Treatment Team Recommendation: Short Term Rehab  Discharge Destination Plan[de-identified] Short Term Rehab  Transport at Discharge : [facility]  Dispatcher Contacted: Yes  Number/Name of Dispatcher: [facility]  Transported by [employer] and Unit #):  [facility]  ETA of Transport (Date): 22  ETA of Transport (Time): 9765

## 2022-05-26 NOTE — ASSESSMENT & PLAN NOTE
Lab Results   Component Value Date    EGFR 21 08/06/2021    EGFR 30 10/01/2020    EGFR 29 09/30/2020    CREATININE 2 06 (H) 08/06/2021    CREATININE 1 56 (H) 10/01/2020    CREATININE 1 58 (H) 09/30/2020   Avoid nephrotoxic medications and hypotension   Monitor for dehydration and ensure adequate hydration and oral intake   Monitor CMP  Continue torsemide

## 2022-05-26 NOTE — ASSESSMENT & PLAN NOTE
No results found for: HGBA1C    Recent Labs     05/25/22  0704 05/25/22  1031 05/25/22  1615 05/26/22  0757   POCGLU 105 173* 124 103       Blood Sugar Average: Last 72 hrs:  (P) 135 [id number]  SSI premeal fingerstick check  Avoid hypoglycemia

## 2022-05-26 NOTE — DISCHARGE SUMMARY
[facility]  Discharge- Mary Youssef 1934, 80 y o  female MRN: [id number]  Unit/Bed#: [facility] 826-01 Encounter: [id number]  Primary Care Provider: Francy Romano DO   Date and time admitted to hospital: 5/19/2022  5:11 AM    UTI (urinary tract infection)  Assessment & Plan  Urine is positive for leukocytes  Will monitor off antibiotics for now  If has fever, wbc count elevation would start antibiotics  Lab test positive for detection of COVID-19 virus  Assessment & Plan  Patient was positive for covid on 5/19/22   Started treatment as patient has diabetes  Mild covid pathway   Labs obtained  Started remdesivir; but not needed oxygen, hence stopped it  stopped dexamethasone as patient currently on room air and saturating well        Renal artery stenosis (HCC)  Assessment & Plan  Monitor blood pressure closely     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 ([facility] )  Assessment & Plan  · Baseline creatinine appears to be approximately 2 0; at baseline now  · Avoid nephrotoxic medication  · Avoid hypotension   · Monitor in and out      Essential hypertension  Assessment & Plan  Continue with metoprolol 25 mg bid and Imdur 30 mg daily  Demodex 40 mg afternoon    AZUL (obstructive sleep apnea)  Assessment & Plan  Monitor closely might need CPAP at night      DM type 2 (diabetes mellitus, type 2) ([facility] )  Assessment & Plan  No results found for: HGBA1C    Recent Labs     05/25/22  0704 05/25/22  1031 05/25/22  1615 05/26/22  0757   POCGLU 105 173* 124 103       Blood Sugar Average: Last 72 hrs:  (P) 135 [id number]  SSI premeal fingerstick check  Avoid hypoglycemia    Hypothyroidism  Assessment & Plan  Continue levothyroxine 112 mg daily     * Fall  Assessment & Plan  · Patient was unwitnessed fall at home  · Work up in ED no spine and brain injury  · Radiology contacted regarding knee arthoplasty is displaced     · Orthopedics consulted - no surgical needs at this time  · Fall precautions; PT/OT  · Unclear history; but 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 versus orthostasis in the setting of COVID pneumonia; similar incidents before, has been prescribed compression stockings in the outpatient setting  · No further workup needed        Medical Problems             Resolved Problems  Date Reviewed: 5/26/2022   None               Discharging Physician / Practitioner: Jayshree Weir MD  PCP: Veronica Alvarez DO  Admission Date:   Admission Orders (From admission, onward)     Ordered        05/21/22 1232  Inpatient Admission  Once            05/19/22 1215  Place in Observation  Once                      Discharge Date: 05/26/22    Disposition:    Other [facility] at [address]    Reason for Admission: fall    Discharge Diagnoses:   Please see assessment and plan section above for further details regarding discharge diagnoses  Consultations During Hospital Stay:  · Trauma  · Orthopedic surgery  ·     Significant Findings / Test Results:   XR knee 1 or 2 vw left   Final Result by Cecy Montes MD (05/19 0189)      There is no acute fracture or dislocation  The femoral component of the total knee arthroplasty appears angulated with increased loss of joint space medially  Recommend orthopedic consultation  The study was marked in [facility] for immediate notification  Workstation performed: [id number]         TRAUMA - CT head wo contrast   Final Result by Doreen Max MD (05/19 9686)      No acute intracranial abnormality  There is generalized atrophy  Advanced microangiopathic changes are present  Old bilateral lacunae are present  Paranasal sinus disease, as described above  Please see discussion               I personally discussed this study with Shannan Solares on 5/19/2022 at 6:05 AM                      Workstation performed: [id number]         TRAUMA - CT spine cervical wo contrast   Final Result by Doreen Max MD (05/19 0617)      No acute cervical spine fracture or traumatic malalignment  There is old healed fracture deformity of the dens and upper body of C2  There has been prior posterior spinal fusion at C1-2  There is multilevel degenerative change of the spine  I personally discussed this study with Naa Mcculloughy on 5/19/2022 at 5:49 AM                       Workstation performed: [id number]         XR trauma multiple   Final Result by Eav Arora MD (05/19 8989)      Right basilar pneumonia with small right pleural effusion  The study was marked in [facility] for immediate notification  Workstation performed: [id number]         XR chest 1 view   Final Result by Eva Arora MD (05/19 9344)      Right basilar pneumonia with small right pleural effusion  The study was marked in [facility] for immediate notification  Workstation performed: [id number]               [facility] Course:      Jeimy Frederick is a 80 y o  female patient who originally presented to the hospital on 5/19/2022  with a PMH of hypothyroidism, HTN, DM, CKD stage 4, cva, AZUL, renal artery stenosis  who presents with unwitnessed fall at home  With discussion with daughter, she was negative for covid at home with home covid tests  She was covid positive in ED  Possible reason for fall  She was a level B trauma alert, seen by trauma - cleared, seen by ortho but no injuries identified    Condition at Discharge: stable    Discharge Day Visit / Exam:   Subjective:  Feels well, has no CP or SOB or N/V    Vitals: Blood Pressure: 142/64 (05/26/22 0723)  Pulse: 56 (05/26/22 0723)  Temperature: 97 5 °F (36 4 °C) (05/26/22 0723)  Temp Source: Axillary (05/25/22 2244)  Respirations: 15 (05/26/22 0723)  Height: 4' 11" (149 9 cm) (05/19/22 1900)  Weight - Scale: 70 kg (154 lb 6 4 oz) (05/24/22 0600)  SpO2: 99 % (05/26/22 0723)  Exam:   Physical Exam  Vitals reviewed  HENT:      Head: Normocephalic and atraumatic  Cardiovascular:      Rate and Rhythm: Normal rate and regular rhythm  Pulmonary:      Effort: Pulmonary effort is normal       Breath sounds: Normal breath sounds  Abdominal:      General: There is no distension  Palpations: Abdomen is soft  Tenderness: There is no abdominal tenderness  Musculoskeletal:      Right lower leg: No edema  Left lower leg: No edema  Neurological:      Mental Status: She is alert and oriented to person, place, and time  Discussion with Family: daughter yesterday  ·     Wound Care Recommendations:  When applicable, please see wound care section of After Visit Summary  Instructions for any Catheters / Lines Present at Discharge (including removal date, if applicable): none    Diet Recommendations at Discharge:  Diet -         Discharge instructions/Information to patient and family:   See after visit summary section titled Discharge Instructions for information provided to patient and family  Planned Readmission: no      Discharge Statement:  I spent 40 minutes discharging the patient  This time was spent on the day of discharge  I had direct contact with the patient on the day of discharge  Greater than 50% of the total time was spent examining patient, answering all patient questions, arranging and discussing plan of care with patient as well as directly providing post-discharge instructions  Additional time then spent on discharge activities  **Please Note: This note may have been constructed using a voice recognition system  **

## 2022-05-27 NOTE — UTILIZATION REVIEW
Notification of Discharge   This is a Notification of Discharge from our facility [address]  Please be advised that this patient has been discharge from our facility  Below you will find the admission and discharge date and time including the patients disposition  UTILIZATION REVIEW CONTACT:  Bonnie Emerson  Utilization   Network Utilization Review Department  Phone: [phone] x carefully listen to the prompts  All voicemails are confidential   Email: [email or web address]     PHYSICIAN ADVISORY SERVICES:  FOR [id number] REVIEW - MEDICAL NECESSITY DENIAL  Phone: [phone]  Fax: [phone]  Email: [email or web address]     PRESENTATION DATE: 5/19/2022  5:11 AM  OBERVATION ADMISSION DATE:   INPATIENT ADMISSION DATE: 5/21/22 12:32 PM   DISCHARGE DATE: 5/26/2022 12:28 PM  DISPOSITION: Released to SNF/TCU/SNU Facility Released to SNF/TCU/SNU Facility      IMPORTANT INFORMATION:  Send all requests for admission clinical reviews, approved or denied determinations and any other requests to dedicated fax number below belonging to the campus where the patient is receiving treatment   List of dedicated fax numbers:  [address] [place] [address] DENIALS (Administrative/Medical Necessity) [phone]   [address] [place] [address] (Maternity/NICU/Pediatrics) [phone]   [facility] [phone]   [facility] [phone]   [address] [place] [address] [phone]   [address] [place] [address],[address] [phone]   [facility]  [phone]   [address] [place] [address]  [facility] [address] [facility] [phone]

## 2022-05-31 ENCOUNTER — NURSING HOME VISIT (OUTPATIENT)
Dept: GERIATRICS | Facility: OTHER | Age: 87
End: 2022-05-31
Payer: COMMERCIAL

## 2022-05-31 DIAGNOSIS — N18.4 STAGE 4 CHRONIC KIDNEY DISEASE (HCC): Primary | ICD-10-CM

## 2022-05-31 DIAGNOSIS — M54.50 CHRONIC MIDLINE LOW BACK PAIN WITHOUT SCIATICA: ICD-10-CM

## 2022-05-31 DIAGNOSIS — G89.29 CHRONIC MIDLINE LOW BACK PAIN WITHOUT SCIATICA: ICD-10-CM

## 2022-05-31 DIAGNOSIS — I50.42 CHRONIC COMBINED SYSTOLIC AND DIASTOLIC HEART FAILURE (HCC): ICD-10-CM

## 2022-05-31 PROCEDURE — 99309 SBSQ NF CARE MODERATE MDM 30: CPT | Performed by: FAMILY MEDICINE

## 2022-05-31 NOTE — PROGRESS NOTES
[address]  [phone]  [facility] of Service: nursing home place of service: POS 31 Skilled Care-Part A Coverage      NAME: Sameer Marroquin  AGE: 80 y o  SEX: female [phone]    DATE OF ENCOUNTER: 5/31/22    Assessment and Plan     Problem List Items Addressed This Visit        Cardiovascular and Mediastinum    Chronic combined systolic and diastolic heart failure (HCC)     Wt Readings from Last 3 Encounters:   05/26/22 70 kg (154 lb 5 2 oz)   04/08/22 71 3 kg (157 lb 3 2 oz)   10/15/21 74 3 kg (163 lb 12 8 oz)     Currently stable  Continue diuresis with torsemide  Monitor weights  Monitor CMP  Follow-up with cardiology as needed                  Genitourinary    Stage 4 chronic kidney disease ([facility] ) - Primary     Lab Results   Component Value Date    EGFR 20 06/01/2022    EGFR 21 08/06/2021    EGFR 30 10/01/2020    CREATININE 2 08 (H) 06/01/2022    CREATININE 2 06 (H) 08/06/2021    CREATININE 1 56 (H) 10/01/2020     Creatinine stable  Will avoid nephrotoxic medication  Encourage po hydration  Will monitor BMP  Other    Chronic midline low back pain without sciatica     Chronic low back pain  Increase gabapentin to 600 mg q h s  Continue Robaxin, encourage taper off as tolerated  Continue Norco as needed  Apply lidocaine patch lower back  Continue physical therapy                     Chief Complaint     Follow up     History of Present Illness     Sameer Marroquin is a 80 y o  female who was seen today for follow up  Patient seen and examined at bedside, states she feels well  Denies cough shortness of breath palpitation chest pain  No fever chills  Denies abdominal pain  Reports chronic constipation  No nausea  Appetite is good  Denies difficulty sleeping  Reports chronic back pain            The following portions of the patient's history were reviewed and updated as appropriate: allergies, current medications, past family history, past medical history, past social history, past surgical history and problem list     Review of Systems     Review of Systems   Constitutional: Negative for chills and fever  HENT: Negative for congestion and rhinorrhea  Respiratory: Negative for cough, shortness of breath and wheezing  Cardiovascular: Positive for leg swelling  Negative for chest pain and palpitations  Gastrointestinal: Negative for abdominal pain and constipation  Endocrine: Negative for cold intolerance  Genitourinary: Negative for difficulty urinating, dysuria and hematuria  Musculoskeletal: Positive for gait problem  Skin: Negative for wound  Allergic/Immunologic: Negative for environmental allergies  Neurological: Negative for dizziness and seizures  Hematological: Does not bruise/bleed easily  Psychiatric/Behavioral: Negative for behavioral problems and sleep disturbance  Active Problem List     Patient Active Problem List   Diagnosis    Hypothyroidism    H/O: CVA (cerebrovascular accident)    Essential hypertension    [facility] combined systolic [facility]    Depression    History of aortic valve replacement    Hyponatremia    Chronic anemia    SDH (subdural hematoma) ([facility])    AZUL (obstructive sleep apnea)    Hyperlipidemia    Chronic respiratory failure with hypoxia ([facility])    Controlled type 2 diabetes mellitus with diabetic neuropathy, without long-term current use of insulin ([facility])    RA (rheumatoid arthritis) ([facility] )    SLE (systemic lupus erythematosus) ([facility]    H/O spinal fusion    Renal artery stenosis (HCC)    History of malignant neoplasm of breast    Obesity (BMI 30 0-34  9)    Osteoporosis    Peripheral polyneuropathy    Vitamin D deficiency    Stage 4 chronic kidney disease (HCC)    Alkalosis    Wheezing    Encounter for follow-up examination after completed treatment for malignant neoplasm    Nephrosclerosis arteriolar, stage 1-4 or unspecified chronic kidney disease    Seasonal allergic rhinitis due to pollen    Toxic metabolic encephalopathy    Aspiration pneumonia due to regurgitated food ([address] )    Macrocytic anemia    Chronic gout without tophus    HTN (hypertension), benign    Recurrent UTI    Multiple drug resistant organism (MDRO) culture positive    COVID-19    Fall    Chronic midline low back pain without sciatica       Objective     Vital Signs:     Blood pressure 124/58 Heart Rate: 64 Respiratory Rate 18   Temperature 97 5 Oxygen Saturation 92%  Weight 152lbs    Physical Exam  Vitals and nursing note reviewed  Constitutional:       General: She is not in acute distress  Appearance: She is obese  She is not diaphoretic  HENT:      Head: Normocephalic and atraumatic  Mouth/Throat:      Mouth: Mucous membranes are dry  Eyes:      General:         Right eye: No discharge  Left eye: No discharge  Pupils: Pupils are equal, round, and reactive to light  Cardiovascular:      Rate and Rhythm: Normal rate and regular rhythm  Heart sounds: Normal heart sounds  No murmur heard  Pulmonary:      Effort: Pulmonary effort is normal  No respiratory distress  Breath sounds: Normal breath sounds  No wheezing  Abdominal:      Palpations: Abdomen is soft  Tenderness: There is no abdominal tenderness  There is no guarding or rebound  Musculoskeletal:         General: Tenderness (lower back) present  Cervical back: Normal range of motion and neck supple  Right lower leg: Edema present  Left lower leg: Edema present  Skin:     General: Skin is warm and dry  Neurological:      Mental Status: She is alert and oriented to person, place, and time  Mental status is at baseline  Psychiatric:         Behavior: Behavior normal          Pertinent Laboratory/Diagnostic Studies:  Laboratory and Imaging studies reviewed  Full report in the paper chart       Current Medications   Medications reviewed and updated in facility chart      Name: Jj Jones  : 1934  MRN: [id number]        Karen Napoles MD  Geriatric Medicine  22 2:21 PM

## 2022-06-02 ENCOUNTER — NURSING HOME VISIT (OUTPATIENT)
Dept: GERIATRICS | Facility: OTHER | Age: 87
End: 2022-06-02
Payer: COMMERCIAL

## 2022-06-02 DIAGNOSIS — R26.2 AMBULATORY DYSFUNCTION: ICD-10-CM

## 2022-06-02 DIAGNOSIS — U07.1 COVID-19: ICD-10-CM

## 2022-06-02 DIAGNOSIS — Z98.1 H/O SPINAL FUSION: Primary | ICD-10-CM

## 2022-06-02 DIAGNOSIS — N18.4 STAGE 4 CHRONIC KIDNEY DISEASE (HCC): ICD-10-CM

## 2022-06-02 DIAGNOSIS — G62.9 NEUROPATHY: ICD-10-CM

## 2022-06-02 DIAGNOSIS — I10 ESSENTIAL HYPERTENSION: Chronic | ICD-10-CM

## 2022-06-02 DIAGNOSIS — E66.9 OBESITY (BMI 30.0-34.9): ICD-10-CM

## 2022-06-02 DIAGNOSIS — I50.42 CHRONIC COMBINED SYSTOLIC AND DIASTOLIC HEART FAILURE (HCC): Primary | ICD-10-CM

## 2022-06-02 PROBLEM — M54.50 CHRONIC MIDLINE LOW BACK PAIN WITHOUT SCIATICA: Status: ACTIVE | Noted: 2022-06-02

## 2022-06-02 PROBLEM — G89.29 CHRONIC MIDLINE LOW BACK PAIN WITHOUT SCIATICA: Status: ACTIVE | Noted: 2022-06-02

## 2022-06-02 PROCEDURE — 99316 NF DSCHRG MGMT 30 MIN+: CPT | Performed by: FAMILY MEDICINE

## 2022-06-02 RX ORDER — HYDROCODONE BITARTRATE AND ACETAMINOPHEN 5; 325 MG/1; MG/1
1 TABLET ORAL EVERY 6 HOURS PRN
Qty: 6 TABLET | Refills: 0 | Status: SHIPPED | OUTPATIENT
Start: 2022-06-02

## 2022-06-02 NOTE — PROGRESS NOTES
[address] [place] [address] Drive: [facility]  POS: 31: SNF/Short Term Rehab    NAME: Yoni Dates  AGE: 80 y o  SEX: female  DATE OF ADMISSION: 5/26/22   DATE OF DISCHARGE:6/3/22   DISCHARGE DISPOSITION: home  Today's Visit: 12:16 PM    Reason for admission: Patient was admitted from [facility] for rehabilitation after hospitalization for COVID infection, generalized weakness, fall  Course of stay: Patient was admitted to  [address] [place] [address] for rehabilitation due to  physical deconditioning and COVID infection  No Significant events during the stay  The patient participated in PT/OT  Assessment/Plan:    Chronic combined systolic and diastolic heart failure (HCC)  Wt Readings from Last 3 Encounters:   05/26/22 70 kg (154 lb 5 2 oz)   04/08/22 71 3 kg (157 lb 3 2 oz)   10/15/21 74 3 kg (163 lb 12 8 oz)     Patient remained stable throughout her stay in the facility  Weight on discharge 148 lb, patient lost 6 lb during her stay  Lower extremity edema baseline  Continue diuresis with torsemide  Monitor CMP periodically  Follow-up with cardiology and PCP  Encourage low-salt diet          Essential hypertension  Blood pressure remained stable throughout her stay in the facility  Will discharge home with metoprolol 25 mg daily and torsemide 20 mg b i d  Follow-up with PCP      Controlled type 2 diabetes mellitus with diabetic neuropathy, without long-term current use of insulin ([facility] [facility] )    Lab Results   Component Value Date    HGBA1C 6 0 (H) 04/02/2021     Well controlled, encourage diabetic diet  Continue gabapentin 300mg in am and 600 mg at bedtime    Stage 4 chronic kidney disease [facility])  Lab Results   Component Value Date    EGFR 20 06/01/2022    EGFR 21 08/06/2021    EGFR 30 10/01/2020    CREATININE 2 08 (H) 06/01/2022    CREATININE 2 06 (H) 08/06/2021    CREATININE 1 56 (H) 10/01/2020     Creatinine stable    Will avoid nephrotoxic medication  Encourage po hydration  Will monitor BMP  Obesity (BMI 30 0-34  9)  Encourage diet and lifestyle modification  Follow-up with PCP    COVID-19  Resolved  Patient asymptomatic on discharge    Ambulatory dysfunction  Continue home physical therapy script provided  Discussed above fall precautions         Discharge Medications: See discharge medication list which was reviewed and signed  Status at time of discharge: Stable    Subjective:  Patient seen and examined at bedside, denies any acute complaints at the time of encounter is states that she made progress with physical therapy, able to walk with a walker which is her baseline  Denies chest pain shortness of breath cough palpitations  No abdominal pain nausea  Reports chronic constipation controlled with current regimen  Appetite is good, denies difficulty sleeping  Lower extremity swelling at baseline  No fever chills    Review of Systems   Constitutional: Negative for chills and fever  HENT: Negative for congestion and rhinorrhea  Respiratory: Negative for cough, shortness of breath and wheezing  Cardiovascular: Negative for chest pain, palpitations and leg swelling  Gastrointestinal: Negative for abdominal pain and constipation  Endocrine: Negative for cold intolerance  Genitourinary: Negative for difficulty urinating, dysuria and hematuria  Musculoskeletal: Positive for gait problem  Skin: Negative for wound  Allergic/Immunologic: Negative for environmental allergies  Neurological: Negative for dizziness and seizures  Hematological: Does not bruise/bleed easily  Psychiatric/Behavioral: Negative for behavioral problems and sleep disturbance  Vital Signs:     Blood pressure 114/56 Heart Rate: 62 Respiratory Rate 19   Temperature 98 1 Oxygen Saturation 92% Weight 148lbs    Exam:     Physical Exam  Vitals and nursing note reviewed  Constitutional:       General: She is not in acute distress       Appearance: She is obese  She is not diaphoretic  HENT:      Head: Normocephalic and atraumatic  Eyes:      General:         Right eye: No discharge  Left eye: No discharge  Pupils: Pupils are equal, round, and reactive to light  Cardiovascular:      Rate and Rhythm: Normal rate and regular rhythm  Heart sounds: Normal heart sounds  No murmur heard  Pulmonary:      Effort: Pulmonary effort is normal  No respiratory distress  Breath sounds: Normal breath sounds  No wheezing  Abdominal:      Palpations: Abdomen is soft  Tenderness: There is no abdominal tenderness  There is no guarding or rebound  Musculoskeletal:         General: Tenderness present  Cervical back: Normal range of motion and neck supple  Right lower leg: Edema present  Left lower leg: Edema present  Skin:     General: Skin is warm and dry  Neurological:      Mental Status: She is alert  Psychiatric:         Behavior: Behavior normal          Discussion with patient/family and further instructions:  -Fall precautions  -Aspiration precautions  -Bleeding precautions  -Monitor for signs/symptoms of infection  -Medication list was reviewed and signed      Follow-up Recommendations: Please follow-up with your primary care physician within 7-10 days of discharge to review medication changes and current status       Problem List Follow-up Recommendations:  Continue home physical therapy script provided  Follow-up with nephrology and cardiology    Elaine Hester MD  Geriatric Medicine  6/2/22  12:16 PM

## 2022-06-02 NOTE — ASSESSMENT & PLAN NOTE
Wt Readings from Last 3 Encounters:   05/26/22 70 kg (154 lb 5 2 oz)   04/08/22 71 3 kg (157 lb 3 2 oz)   10/15/21 74 3 kg (163 lb 12 8 oz)     Currently stable  Continue diuresis with torsemide  Monitor weights  Monitor CMP  Follow-up with cardiology as needed

## 2022-06-02 NOTE — ASSESSMENT & PLAN NOTE
Lab Results   Component Value Date    EGFR 20 06/01/2022    EGFR 21 08/06/2021    EGFR 30 10/01/2020    CREATININE 2 08 (H) 06/01/2022    CREATININE 2 06 (H) 08/06/2021    CREATININE 1 56 (H) 10/01/2020     Creatinine stable  Will avoid nephrotoxic medication  Encourage po hydration  Will monitor BMP

## 2022-06-02 NOTE — ASSESSMENT & PLAN NOTE
Chronic low back pain  Increase gabapentin to 600 mg q h s    Continue Robaxin, encourage taper off as tolerated  Continue Norco as needed  Apply lidocaine patch lower back  Continue physical therapy

## 2022-06-03 PROBLEM — R26.2 AMBULATORY DYSFUNCTION: Status: ACTIVE | Noted: 2022-06-03

## 2022-06-03 RX ORDER — GABAPENTIN 300 MG/1
300 CAPSULE ORAL 2 TIMES DAILY
COMMUNITY

## 2022-06-03 NOTE — ASSESSMENT & PLAN NOTE
Blood pressure remained stable throughout her stay in the facility  Will discharge home with metoprolol 25 mg daily and torsemide 20 mg b i d    Follow-up with PCP

## 2022-06-03 NOTE — ASSESSMENT & PLAN NOTE
Wt Readings from Last 3 Encounters:   05/26/22 70 kg (154 lb 5 2 oz)   04/08/22 71 3 kg (157 lb 3 2 oz)   10/15/21 74 3 kg (163 lb 12 8 oz)     Patient remained stable throughout her stay in the facility  Weight on discharge 148 lb, patient lost 6 lb during her stay    Lower extremity edema baseline  Continue diuresis with torsemide  Monitor CMP periodically  Follow-up with cardiology and PCP  Encourage low-salt diet

## 2022-06-03 NOTE — ASSESSMENT & PLAN NOTE
Lab Results   Component Value Date    HGBA1C 6 0 (H) 04/02/2021     Well controlled, encourage diabetic diet    Continue gabapentin 300mg in am and 600 mg at bedtime

## 2022-06-07 ENCOUNTER — TELEPHONE (OUTPATIENT)
Dept: CARDIOLOGY CLINIC | Facility: CLINIC | Age: 87
End: 2022-06-07

## 2022-06-07 DIAGNOSIS — N18.4 STAGE 4 CHRONIC KIDNEY DISEASE (HCC): ICD-10-CM

## 2022-06-07 DIAGNOSIS — I50.42 CHRONIC COMBINED SYSTOLIC AND DIASTOLIC HEART FAILURE (HCC): ICD-10-CM

## 2022-06-07 RX ORDER — TORSEMIDE 20 MG/1
20 TABLET ORAL 2 TIMES DAILY
Qty: 180 TABLET | Refills: 3 | Status: SHIPPED | OUTPATIENT
Start: 2022-06-07

## 2022-06-07 RX ORDER — TORSEMIDE 20 MG/1
20 TABLET ORAL 2 TIMES DAILY
Qty: 180 TABLET | Refills: 3
Start: 2022-06-07 | End: 2022-06-07 | Stop reason: SDUPTHER

## 2022-06-07 NOTE — TELEPHONE ENCOUNTER
Daughter called, pt had been in the hospital for one week at the end of May  Had a fall and then was found Covid+  They were giving her the torsemide 40 mg in am instead of the 20 mg bid she was doing at home  Daughter would like to know if it effects her kidneys differently doing it as one dose versus bid? If not, can she do the 40 mg qd?     Please advise

## 2022-06-30 DIAGNOSIS — E78.1 HYPERTRIGLYCERIDEMIA: ICD-10-CM

## 2022-06-30 RX ORDER — FENOFIBRATE 120 MG/1
TABLET ORAL
Qty: 90 TABLET | Refills: 3 | Status: SHIPPED | OUTPATIENT
Start: 2022-06-30

## 2022-07-01 NOTE — ED PROVIDER NOTES
History  Chief Complaint   Patient presents with    Fall     pt had unwitnessed fall  pt states she tripped over her o2 tubing  pt denies headstrike however was found face down  pt is on plavix due to TIAs     HPI  80-year-old male history of previous TIA Plavix, CHF, hypertension, diabetes and lung disease on home oxygen 2 L presents to the ED for evaluation fall  Patient states she tripped over her oxygen tubing evening  She hit her face on the door and landed face down  She is unsure if she had loss of consciousness  She does not complain of a headache or visual changes or dizziness  The family then or on her down after she called for help  They helped her after calling EMS  Patient appeared slightly weaker than normal so patient family agreed to allow EMS to take her to the ER  Patient complains of left shoulder pain in the ED, she reports she always has this pain  She she has no other complaints at this time  Patient denies fevers, chills, chest pain, palpitations, abdominal pain, diarrhea, melena, hematochezia, dysuria, new skin rashes or numbness or tingling of the extremities  Prior to Admission Medications   Prescriptions Last Dose Informant Patient Reported? Taking?    ALLOPURINOL PO   Yes No   Sig: Take by mouth daily   HYDROcodone-acetaminophen (NORCO) 5-325 mg per tablet   Yes No   Sig: Take 1 tablet by mouth every 8 (eight) hours as needed for pain   Multiple Vitamin (multivitamin) capsule   Yes No   Sig: Take 1 capsule by mouth daily   Multiple Vitamins-Calcium (MULTI-DAY/CALCIUM/EXTRA IRON PO)  Child Yes No   Sig: Take 1 tablet by mouth daily   Red Yeast Rice Extract (RED YEAST RICE PO)  Child Yes No   Sig: Take 600 mg by mouth daily   albuterol (2 5 mg/3 mL) 0 083 % nebulizer solution  Child No No   Sig: Take 1 vial (2 5 mg total) by nebulization 3 (three) times a day   Patient not taking: Reported on 12/15/2020   albuterol (PROVENTIL HFA,VENTOLIN HFA) 90 mcg/act inhaler  Child Yes No Transitional planning. Intubated/ Sedated FiO2 30%, PEEP of 8, follows commands, Wound Ostomy for Chronic Wounds, [facility] Referral sent to [facility]. Hilda partida/ DANIELA provided Medicaid information- pt out of SNF days with insurance. Jessica Lazar with [facility] called, they can accept pt, will start pre-cert when appropriate. Sig: Inhale 2 puffs every 4 (four) hours as needed for wheezing   calcium carbonate (OS-EMILY) 1250 (500 Ca) MG tablet   Yes No   Sig: Take 1 tablet by mouth daily   clopidogrel (PLAVIX) 75 mg tablet  Child No No   Sig: Take 1 tablet (75 mg total) by mouth daily   colchicine (COLCRYS) 0 6 mg tablet   Yes No   Sig: Take 0 6 mg by mouth every other day   docusate sodium (COLACE) 100 mg capsule   Yes No   Sig: Take 100 mg by mouth 2 (two) times a day as needed for constipation   gabapentin (NEURONTIN) 300 mg capsule  Child No No   Sig: Take 1 capsule (300 mg total) by mouth 3 (three) times a day   isosorbide mononitrate (IMDUR) 30 mg 24 hr tablet   No No   Sig: TAKE 1 TABLET DAILY   levothyroxine 112 mcg tablet  Child Yes No   Sig: Take 112 mcg by mouth daily    lidocaine (LIDODERM) 5 %   Yes No   Sig: Apply 1 patch topically daily Remove & Discard patch within 12 hours or as directed by MD   methocarbamol (ROBAXIN) 500 mg tablet   Yes No   Sig: Take 500 mg by mouth 3 (three) times a day as needed for muscle spasms   metolazone (ZAROXOLYN) 5 mg tablet  Child No No   Sig: Take 1 tablet (5 mg total) by mouth as needed (if gains 2lbs in 2 days )   Patient not taking: Reported on 12/15/2020   metoprolol tartrate (LOPRESSOR) 25 mg tablet   No No   Sig: Take 1 tablet (25 mg total) by mouth 2 (two) times a day   nitroglycerin (NITROSTAT) 0 4 mg SL tablet  Child Yes No   sertraline (ZOLOFT) 25 mg tablet  Child Yes No   Si mg daily at bedtime    torsemide (DEMADEX) 20 mg tablet   No No   Simg in am and 20 mg in afternoon      Facility-Administered Medications: None       Past Medical History:   Diagnosis Date    Arthritis     Breast cancer ([facility] [place] [facility] ) 2015    BILATERAL    Cardiac disease     aortic valve transplant    [facility] failure[facility]     Compression fracture of body of thoracic vertebra (HCC)     CVA (cerebral vascular accident) ([facility] [place] [facility] )     Diabetes mellitus ([place] [address] )     Disease of thyroid gland     Fibromyalgia, primary     H/O cervical fracture 01/09/2019    Hyperlipidemia     Hypertension     Neuropathy     AZUL (obstructive sleep apnea) 10/19/2019    Pressure injury of skin     Renal disorder     kidney stent    Stroke [facility])        Past Surgical History:   Procedure Laterality Date    AORTIC VALVE SURGERY      BREAST LUMPECTOMY Right 09/08/2015    BREAST LUMPECTOMY Left 09/08/2015    BREAST SURGERY      lumpectomy for breast cancer    CATARACT EXTRACTION      CHOLECYSTECTOMY      FACIAL/NECK BIOPSY Right 9/24/2020    Procedure: EXCISION CHEEK LESION X2 WITH FROZEN SECTION;  Surgeon: Paul Solares DO;  Location:  MAIN OR;  Service: Plastics    HYSTERECTOMY  1978    IR THORACENTESIS  2/28/2020    JOINT REPLACEMENT      KIDNEY SURGERY      stent placed for kidney    OTHER SURGICAL HISTORY      abdominal aneurysm surgery    AK ARTHRODESIS POSTERIOR ATLAS-AXIS C1-C2 N/A 5/1/2019    Procedure: C1-C2 lateral mass fixation fusion with possible sublaminar cables;  Surgeon: Hermelinda Hidalgo MD;  Location:  MAIN OR;  Service: Neurosurgery    REPLACEMENT TOTAL KNEE      left        Family History   Problem Relation Age of Onset    Heart disease Mother     Arthritis Mother     Diabetes Mother     Heart failure Father     Arthritis Father     Other Father         enlargement of prostate     Dementia Father     No Known Problems Daughter     No Known Problems Maternal Grandmother     No Known Problems Maternal Grandfather     No Known Problems Paternal Grandmother     No Known Problems Paternal Grandfather     No Known Problems Maternal Aunt     No Known Problems Maternal Aunt     No Known Problems Maternal Aunt     No Known Problems Maternal Aunt     No Known Problems Paternal Aunt     No Known Problems Paternal Aunt     Prostate cancer Son     Prostate cancer Son      I have reviewed and agree with the history as documented      E-Cigarette/Vaping    E-Cigarette Use Never User      E-Cigarette/Vaping Substances    Nicotine No     THC No     CBD No     Flavoring No     Other No     Unknown No      Social History     Tobacco Use    Smoking status: Never Smoker    Smokeless tobacco: Never Used   Substance Use Topics    Alcohol use: Yes     Frequency: Monthly or less     Drinks per session: 1 or 2     Binge frequency: Less than monthly    Drug use: Never        Review of Systems   All other systems reviewed and are negative  Physical Exam  ED Triage Vitals   Temperature Pulse Respirations Blood Pressure SpO2   03/03/21 0103 03/03/21 0034 03/03/21 0034 03/03/21 0034 03/03/21 0034   98 6 °F (37 °C) 68 18 148/76 96 %      Temp src Heart Rate Source Patient Position - Orthostatic VS BP Location FiO2 (%)   -- 03/03/21 0034 -- -- --    Monitor         Pain Score       --                    Orthostatic Vital Signs  Vitals:    03/03/21 0034 03/03/21 0200   BP: 148/76 153/85   Pulse: 68 58       Physical Exam   PHYSICAL EXAM  /85   Pulse 58   Temp 98 6 °F (37 °C)   Resp 17   LMP  (LMP Unknown)   SpO2 100%   Airway: Intact  Breathing: Breathing comfortably on room air, breath sounds present and equal bilaterally  Circulation: 2+ radial, femoral, dorsalis pedis, posterior tibial pulses  Head: Atraumatic, no edema, ecchymoses  Eyes: PERRL, EOMI, Negative racoon sign  Nose: No deformity, no septal hematoma  Small abrasion over nose  Mouth/Throat: No dental fractures, no malocclusion  Neck:   No midline C-spine tenderness  Chest: No deformity or ecchymosis, negative seat-belt sign, no crepitus to palpation, no flail chest    CV: Regular rate and rhythm   Respiratory: Breathing comfortably on 2L, breath sounds present and equal bilaterally  Abdomen: Non-distended, normal bowel signs, non-tender to palpation, negative seat-belt sign  Pelvis: Stable  Extremities: No deformities, moves all extremities spontaneously  Back: No step-offs, ecchymoses, or deformities  Non-tender over T- and L- spine  Neuro: GCS 15  Moves all extremities  Vital signs and nursing notes reviewed      ED Medications  Medications - No data to display    Diagnostic Studies  Results Reviewed     None                 CT head without contrast   Final Result by Chinmay Benton MD (03/03 0124)      No acute intracranial abnormality  Generalized atrophy appears similar to the prior study  There is advanced microangiopathic change  Old lacunae are present  Workstation performed: [id number]               Procedures  Procedures      ED Course  ED Course as of Mar 03 0256   Wed Mar 03, 2021   0053 EKG: normal EKG, normal sinus rhythm        0204 CT HEAD IMPRESSION:  No acute intracranial abnormality  Generalized atrophy appears similar to the prior study  There is advanced microangiopathic change  Old lacunae are present  SBIRT 20yo+      Most Recent Value   SBIRT (24 yo +)   In order to provide better care to our patients, we are screening all of our patients for alcohol and drug use  Would it be okay to ask you these screening questions? No Filed at: 03/03/2021 0104                MDM  Number of Diagnoses or Management Options  Fall, initial encounter:   Diagnosis management comments: 80-year-old male history of previous TIA Plavix, CHF, hypertension, diabetes and lung disease on home oxygen 2 L presents to the ED for evaluation fall  Questionable loss consciousness, positive head straight  Normal neurological exam is focal findings  No signs of trauma on exam except small abrasion may or may not realize her nose  CT head showed no acute findings  Prior to discharge, pt was able to walk with a walker to the bathroom  Pt was discharged home with her daughter          Amount and/or Complexity of Data Reviewed  Tests in the radiology section of CPT®: ordered and reviewed  Discussion of test results with the performing providers: yes  Review and summarize past medical records: yes  Discuss the patient with other providers: yes    Risk of Complications, Morbidity, and/or Mortality  Presenting problems: moderate  Diagnostic procedures: moderate  Management options: low    Patient Progress  Patient progress: improved      Disposition  Final diagnoses:   Fall, initial encounter     Time reflects when diagnosis was documented in both MDM as applicable and the Disposition within this note     Time User Action Codes Description Comment    3/3/2021  2:25 AM Justus Wilson Add [H90  Tacos Paredes, initial encounter       ED Disposition     ED Disposition Condition Date/Time Comment    Discharge Stable Wed Mar 3, 2021  2:25 AM Ryder Garcia discharge to home/self care  Follow-up Information     Follow up With Specialties Details Why Contact Info    Lucia Toribio DO Family Medicine Call  As needed [address]  [phone]            Patient's Medications   Discharge Prescriptions    No medications on file     No discharge procedures on file  PDMP Review       Value Time User    PDMP Reviewed  Yes 9/29/2020 11:33 PM Joe Zapata DO           ED Provider  Attending physically available and evaluated Ryder Garcia  I managed the patient along with the ED Attending      Electronically Signed by         Rika Duran MD  03/03/21 3900

## 2022-07-02 NOTE — TELEPHONE ENCOUNTER
CALL RETURN FORM   Reason for patient call? Daughter returning call to r/s with Hedy De La Rosa for  survivorship appt   Patient's primary oncologist? Gwen Mireles    Name of person the patient was calling for? Gwen Mireles   Any additional information to add, if applicable? [phone]   Informed patient that the message will be forwarded to the team and someone will get back to them as soon as possible    Did you relay this information to the patient?  yes - - -

## 2022-07-15 ENCOUNTER — TELEPHONE (OUTPATIENT)
Dept: SURGICAL ONCOLOGY | Facility: CLINIC | Age: 87
End: 2022-07-15

## 2022-07-15 NOTE — TELEPHONE ENCOUNTER
Left message for patient to schedule mammogram since she has a f/u appt with Kaylyn Rivas on 7/19/22  Provided number for central scheduling and hopeline

## 2022-07-18 ENCOUNTER — TELEPHONE (OUTPATIENT)
Dept: HEMATOLOGY ONCOLOGY | Facility: CLINIC | Age: 87
End: 2022-07-18

## 2022-07-18 NOTE — TELEPHONE ENCOUNTER
Appointment Cancellation Or Reschedule     Person calling in Child    Provider Adele Domingo   Office Visit Date and Time  07/19 @ 11AM   Office Visit Location [place]   Did patient want to reschedule their office appointment? If so, when was it scheduled to? No, will call back   Is this patient calling to reschedule an infusion appointment? No   When is their next infusion appointment? N/A   Is this patient a Chemo patient? No   Reason for Cancellation or Reschedule Patients mobility is decreasing and daughter would like to discuss options for these appointments/if these appointments are necessary     If the patient is a treatment patient, please route this to the office nurse  If the patient is not on treatment, please route to the office MA  If the patient is a surgical oncology patient, please route to surg/onc clinical pool

## 2022-07-18 NOTE — TELEPHONE ENCOUNTER
CALL RETURN FORM   Reason for patient call? Patients daughter Len Ramos is calling with concerns regarding her mothers mobility  She states her "mobility is almost 0" and it is difficult to get her to her appointments  She would like to discuss options for these appointments or if these follow ups are still necessary in patients current state    Patient's primary oncologist? Kathie Coon   Name of person the patient was calling for? Kathie Coon   Any additional information to add, if applicable? Teri's call back # [phone]   Informed patient that the message will be forwarded to the team and someone will get back to them as soon as possible    Did you relay this information to the patient?  Yes

## 2022-07-19 ENCOUNTER — TELEPHONE (OUTPATIENT)
Dept: CARDIOLOGY CLINIC | Facility: CLINIC | Age: 87
End: 2022-07-19

## 2022-07-19 NOTE — TELEPHONE ENCOUNTER
Daughter called, pt's pcp advised her to stop the fenofibrate because creatinine #'s are going up  Her 6 mth appt is due in OCT  Do you want to wait and discuss it with her then?       Please advise

## 2022-07-20 NOTE — TELEPHONE ENCOUNTER
Spoke with patient's daughter Annika Fresh  Reviewed standard is f/u in our office with clinical exams until 10 years from diagnosis  However, given patient's current state/ decreased mobility, we have agreed to have patient receive CBE with PCP and they will contact us with any concerns in the future

## 2022-07-25 NOTE — TELEPHONE ENCOUNTER
Creatinine is same as it was in Aug 2021, continue for now    Message text     Per Dr Madalyn Cary,    S/w Gagandeep Smith, patients daughter-- advised verbally understood

## 2022-09-14 ENCOUNTER — TELEPHONE (OUTPATIENT)
Dept: CARDIOLOGY CLINIC | Facility: CLINIC | Age: 87
End: 2022-09-14

## 2022-09-14 DIAGNOSIS — E78.2 MIXED HYPERLIPIDEMIA: Primary | ICD-10-CM

## 2022-09-14 DIAGNOSIS — E78.1 HYPERTRIGLYCERIDEMIA: ICD-10-CM

## 2022-09-14 RX ORDER — FENOFIBRATE 120 MG/1
120 TABLET ORAL DAILY
Qty: 90 TABLET | Refills: 3 | Status: SHIPPED | OUTPATIENT
Start: 2022-09-14

## 2022-09-15 ENCOUNTER — TELEPHONE (OUTPATIENT)
Dept: CARDIOLOGY CLINIC | Facility: CLINIC | Age: 87
End: 2022-09-15

## 2022-09-15 NOTE — TELEPHONE ENCOUNTER
Daughter advised pt pt will be having [facility] labs draw bw at the home, called [facility] faxed over laborder along with initial paperwork to start process  Faxed to [phone]  Tele [facility] [phone]    Called daughter and left a message that order and paperwork  faxed over    Advised if any questions to call office

## 2022-09-16 ENCOUNTER — TELEPHONE (OUTPATIENT)
Dept: CARDIOLOGY CLINIC | Facility: CLINIC | Age: 87
End: 2022-09-16

## 2022-09-16 NOTE — TELEPHONE ENCOUNTER
[facility] reached out fax went to error bin, birth date was incorrect on fax sheet  Corrected birth date and refaxed over to [facility]

## 2022-09-20 ENCOUNTER — OFFICE VISIT (OUTPATIENT)
Dept: NEPHROLOGY | Facility: CLINIC | Age: 87
End: 2022-09-20
Payer: COMMERCIAL

## 2022-09-20 VITALS
HEIGHT: 59 IN | BODY MASS INDEX: 31.85 KG/M2 | WEIGHT: 158 LBS | HEART RATE: 70 BPM | SYSTOLIC BLOOD PRESSURE: 120 MMHG | DIASTOLIC BLOOD PRESSURE: 78 MMHG

## 2022-09-20 DIAGNOSIS — N18.9 CHRONIC KIDNEY DISEASE, UNSPECIFIED CKD STAGE: Primary | ICD-10-CM

## 2022-09-20 PROCEDURE — 99213 OFFICE O/P EST LOW 20 MIN: CPT | Performed by: INTERNAL MEDICINE

## 2022-09-20 RX ORDER — METHENAMINE HIPPURATE 1000 MG/1
1 TABLET ORAL 2 TIMES DAILY WITH MEALS
COMMUNITY

## 2022-09-20 RX ORDER — MULTIVIT WITH MINERALS/LUTEIN
1000 TABLET ORAL DAILY
COMMUNITY

## 2022-09-20 NOTE — PROGRESS NOTES
NEPHROLOGY PROGRESS NOTE    Clay Fan 80 y o  female MRN: [id number]  Unit/Bed#:  Encounter: [id number]  Reason for Consult:  Chronic kidney disease    Patient is here for routine follow-up  The patient is in good spirits and is here because her renal function has been increasing and her other physicians had been urging her to reconnect with me  They have no specific complaints today just wanted to review her medications and discuss her kidney issues  ASSESSMENT/PLAN:  1  Renal    Patient has chronic renal insufficiency and I had seen her about 7 years ago and she was seen again a couple years ago  At this point her creatinine is running around to 2 and it has been that way for over a year  Urinalyses up through May showed no significant proteinuria  That tells me that she does not have significant intrinsic disease and likely has nephrosclerosis  Also she is on medications and has systolic dysfunction as her echo shows ejection fraction of 30% when last checked this potentially could cause some effective volume depletion increased creatinine  At this point the really has not been significant progression but we will continue to monitor  I told her the most important thing is her fluid control on her breathing be comfortable and it may be that the medications make her creatinine a little bit higher from time to time but it does not appear she has significant intrinsic disease  I reviewed her medications and concerns and explained her that all the medication she is on are okay to take  Fenofibrate can cause an increase in creatinine without a true decline in kidney function by unclear mechanisms but potentially increasing production of creatinine by muscle or interfering with secretion  If she needs this medication per Cardiology she can continue it and we will continue to monitor      I am going to do repeat blood work in October with her family doctor and rheumatologist blood work to make sure renal function is been stable since July  If everything stable will monitor labs every few months and then follow-up as scheduled  SUBJECTIVE:  Review of Systems   Constitutional: Negative for chills, diaphoresis, fever and malaise/fatigue  HENT: Negative  Eyes: Negative  Cardiovascular: Positive for leg swelling  Negative for chest pain, dyspnea on exertion and orthopnea  Respiratory: Negative  Negative for cough, shortness of breath, sputum production and wheezing  Gastrointestinal: Negative for abdominal pain, diarrhea, nausea and vomiting  Genitourinary: Positive for bladder incontinence  Negative for dysuria, flank pain and hematuria  Neurological: Negative for dizziness, focal weakness, headaches and weakness  Psychiatric/Behavioral: Negative for altered mental status, depression, hallucinations and hypervigilance  OBJECTIVE:  Current Weight: Weight - Scale: 71 7 kg (158 lb)  [email or web address]:     Blood pressure 120/78, pulse 70, height 4' 11" (1 499 m), weight 71 7 kg (158 lb), not currently breastfeeding  , Body mass index is 31 91 kg/m²  [unfilled]    Physical Exam: /78 (BP Location: Left arm, Patient Position: Sitting, Cuff Size: Standard)   Pulse 70   Ht 4' 11" (1 499 m)   Wt 71 7 kg (158 lb)   LMP  (LMP Unknown)   BMI 31 91 kg/m²   Physical Exam  Constitutional:       General: She is not in acute distress  Appearance: She is not toxic-appearing or diaphoretic  HENT:      Head: Normocephalic and atraumatic  Nose: Nose normal       Mouth/Throat:      Mouth: Mucous membranes are moist    Eyes:      General: No scleral icterus  Extraocular Movements: Extraocular movements intact  Cardiovascular:      Rate and Rhythm: Normal rate and regular rhythm  Heart sounds: No friction rub  No gallop  Comments: Positive edema  Pulmonary:      Effort: Pulmonary effort is normal  No respiratory distress  Breath sounds:  No wheezing, rhonchi or rales  Abdominal:      General: Bowel sounds are normal  There is no distension  Palpations: Abdomen is soft  Tenderness: There is no abdominal tenderness  There is no rebound  Musculoskeletal:      Cervical back: Normal range of motion and neck supple  Neurological:      General: No focal deficit present  Mental Status: She is alert and oriented to person, place, and time  Mental status is at baseline  Psychiatric:         Mood and Affect: Mood normal          Behavior: Behavior normal          Thought Content:  Thought content normal          Judgment: Judgment normal          Medications:    Current Outpatient Medications:     acetaminophen (TYLENOL) 325 mg tablet, Take 2 tablets (650 mg total) by mouth every 6 (six) hours as needed for mild pain, Disp: , Rfl: 0    albuterol (2 5 mg/3 mL) 0 083 % nebulizer solution, Take 1 vial (2 5 mg total) by nebulization 3 (three) times a day (Patient taking differently: Take 2 5 mg by nebulization 3 (three) times a day as needed), Disp: 25 vial, Rfl: 3    albuterol (PROVENTIL HFA,VENTOLIN HFA) 90 mcg/act inhaler, Inhale 2 puffs every 4 (four) hours as needed for wheezing, Disp: , Rfl: 0    allopurinol (ZYLOPRIM) 100 mg tablet, Take 1 5 tablets by mouth daily , Disp: , Rfl:     Ascorbic Acid (vitamin C) 1000 MG tablet, Take 1,000 mg by mouth daily, Disp: , Rfl:     calcium carbonate (OS-EMILY) 1250 (500 Ca) MG tablet, Take 1 tablet by mouth daily, Disp: , Rfl:     clopidogrel (PLAVIX) 75 mg tablet, TAKE 1 TABLET DAILY, Disp: 90 tablet, Rfl: 3    clopidogrel (PLAVIX) 75 mg tablet, Take 1 tablet (75 mg total) by mouth daily, Disp: , Rfl: 0    docusate sodium (COLACE) 100 mg capsule, Take 100 mg by mouth 2 (two) times a day as needed for constipation , Disp: , Rfl:     docusate sodium (COLACE) 100 mg capsule, Take 1 capsule (100 mg total) by mouth 2 (two) times a day, Disp: , Rfl: 0    fenofibrate (FENOGLIDE) 120 MG TABS, Take 1 tablet (120 mg total) by mouth daily, Disp: 90 tablet, Rfl: 3    gabapentin (NEURONTIN) 300 mg capsule, Take 1 capsule (300 mg total) by mouth 3 (three) times a day, Disp: , Rfl: 0    HYDROcodone-acetaminophen (NORCO) 5-325 mg per tablet, Take 1 tablet by mouth every 6 (six) hours as needed for pain Max Daily Amount: 4 tablets, Disp: 6 tablet, Rfl: 0    isosorbide mononitrate (IMDUR) 30 mg 24 hr tablet, Take 1 tablet (30 mg total) by mouth daily, Disp: , Rfl: 0    levothyroxine 112 mcg tablet, Take 112 mcg by mouth daily , Disp: , Rfl:     lidocaine (LIDODERM) 5 %, Apply 1 patch topically as needed Remove & Discard patch within 12 hours or as directed by MD , Disp: , Rfl:     lidocaine (LIDODERM) 5 %, Apply 1 patch topically daily Remove & Discard patch within 12 hours or as directed by MD, Disp: , Rfl: 0    Lumigan 0 01 % ophthalmic drops, Administer 1 drop to both eyes daily at bedtime  , Disp: , Rfl:     methenamine hippurate (HIPREX) 1 g tablet, Take 1 g by mouth 2 (two) times a day with meals, Disp: , Rfl:     methocarbamol (ROBAXIN) 500 mg tablet, Take 500 mg by mouth 3 (three) times a day as needed for muscle spasms, Disp: , Rfl:     metoprolol tartrate (LOPRESSOR) 25 mg tablet, TAKE 1 TABLET TWICE A DAY, Disp: 180 tablet, Rfl: 3    Multiple Vitamins-Calcium (MULTI-DAY/CALCIUM/EXTRA IRON PO), Take 1 tablet by mouth daily, Disp: , Rfl:     Multiple Vitamins-Minerals (multivitamin with iron-minerals) liquid, Take 15 mL by mouth daily, Disp: , Rfl: 0    nitroglycerin (NITROSTAT) 0 4 mg SL tablet, , Disp: , Rfl:     Red Yeast Rice Extract (RED YEAST RICE PO), Take 600 mg by mouth daily, Disp: , Rfl:     sertraline (ZOLOFT) 25 mg tablet, 25 mg daily at bedtime , Disp: , Rfl:     torsemide (DEMADEX) 20 mg tablet, Take 1 tablet (20 mg total) by mouth 2 (two) times a day May take one extra dose as needed for wt gain,increased sob or increased edema , Disp: 180 tablet, Rfl: 3    albuterol (PROVENTIL HFA,VENTOLIN HFA) 90 mcg/act inhaler, Inhale 2 puffs every 4 (four) hours as needed for wheezing, Disp: , Rfl:     allopurinol (ZYLOPRIM) 300 mg tablet, Take 0 5 tablets (150 mg total) by mouth daily, Disp: , Rfl: 0    calcium carbonate (TUMS) 500 mg chewable tablet, Chew 2 tablets (1,000 mg total) daily as needed for indigestion or heartburn, Disp: , Rfl: 0    colchicine (COLCRYS) 0 6 mg tablet, Take 0 6 mg by mouth see administration instructions Every 3 days (Patient not taking: Reported on 4/8/2022 ), Disp: , Rfl:     enoxaparin (LOVENOX) 30 mg/0 3 mL, Inject 0 3 mL (30 mg total) under the skin daily for 21 days, Disp: , Rfl: 0    gabapentin (NEURONTIN) 300 mg capsule, Take 300 mg by mouth 2 (two) times a day Take 300 mg in am and 600 mg at bedtime, Disp: , Rfl:     isosorbide mononitrate (IMDUR) 30 mg 24 hr tablet, TAKE 1 TABLET DAILY, Disp: 90 tablet, Rfl: 3    levothyroxine 112 mcg tablet, Take 1 tablet (112 mcg total) by mouth daily in the early morning, Disp: , Rfl: 0    methocarbamol (ROBAXIN) 750 mg tablet, Take 1 tablet (750 mg total) by mouth every 6 (six) hours as needed for muscle spasms, Disp: , Rfl: 0    metolazone (ZAROXOLYN) 5 mg tablet, Take 1 tablet (5 mg total) by mouth as needed (if gains 2lbs in 2 days ) (Patient not taking: Reported on 8/27/2021), Disp: 15 tablet, Rfl: 0    metoprolol tartrate (LOPRESSOR) 25 mg tablet, Take 1 tablet (25 mg total) by mouth every 12 (twelve) hours, Disp: , Rfl: 0    Multiple Vitamin (multivitamin) capsule, Take 1 capsule by mouth daily (Patient not taking: Reported on 10/15/2021 ), Disp: , Rfl:     senna (SENOKOT) 8 6 mg, Take 1 tablet (8 6 mg total) by mouth daily, Disp: , Rfl: 0    sertraline (ZOLOFT) 25 mg tablet, Take 1 tablet (25 mg total) by mouth daily, Disp: , Rfl: 0    torsemide 40 MG TABS, Take 40 mg by mouth daily, Disp: , Rfl: 0    Laboratory Results:  Lab Results   Component Value Date    WBC 5 47 06/01/2022    HGB 8 8 (L) 06/01/2022 HCT 29 4 (L) 06/01/2022     (H) 06/01/2022     06/01/2022     Lab Results   Component Value Date    SODIUM 135 (L) 06/01/2022    K 4 3 06/01/2022    CL 96 (L) 06/01/2022    CO2 30 06/01/2022    BUN 70 (H) 06/01/2022    CREATININE 2 08 (H) 06/01/2022    GLUC 103 05/24/2022    CALCIUM 8 7 06/01/2022     Lab Results   Component Value Date    CALCIUM 8 7 06/01/2022    PHOS 4 0 05/24/2022     No results found for: LABPROT

## 2022-09-20 NOTE — LETTER
September 20, 2022     Nickie Weiss DO  [address] [address] [place]    Patient: Mcehe Christian   YOB: 1934   Date of Visit: 9/20/2022       Dear Dr Shellie Ro: Thank you for referring Meche Christian to me for evaluation  Below are my notes for this consultation  If you have questions, please do not hesitate to call me  I look forward to following your patient along with you  Sincerely,        Corin Garrett MD        CC: MD Corin Morgan MD  9/20/2022  3:05 PM  Sign when Signing Visit  NEPHROLOGY PROGRESS NOTE    Meche Christian 80 y o  female MRN: [id number]  Unit/Bed#:  Encounter: [id number]  Reason for Consult:  Chronic kidney disease    Patient is here for routine follow-up  The patient is in good spirits and is here because her renal function has been increasing and her other physicians had been urging her to reconnect with me  They have no specific complaints today just wanted to review her medications and discuss her kidney issues  ASSESSMENT/PLAN:  1  Renal    Patient has chronic renal insufficiency and I had seen her about 7 years ago and she was seen again a couple years ago  At this point her creatinine is running around to 2 and it has been that way for over a year  Urinalyses up through May showed no significant proteinuria  That tells me that she does not have significant intrinsic disease and likely has nephrosclerosis  Also she is on medications and has systolic dysfunction as her echo shows ejection fraction of 30% when last checked this potentially could cause some effective volume depletion increased creatinine  At this point the really has not been significant progression but we will continue to monitor    I told her the most important thing is her fluid control on her breathing be comfortable and it may be that the medications make her creatinine a little bit higher from time to time but it does not appear she has significant intrinsic disease  I reviewed her medications and concerns and explained her that all the medication she is on are okay to take  Fenofibrate can cause an increase in creatinine without a true decline in kidney function by unclear mechanisms but potentially increasing production of creatinine by muscle or interfering with secretion  If she needs this medication per Cardiology she can continue it and we will continue to monitor  I am going to do repeat blood work in October with her family doctor and rheumatologist blood work to make sure renal function is been stable since July  If everything stable will monitor labs every few months and then follow-up as scheduled  SUBJECTIVE:  Review of Systems   Constitutional: Negative for chills, diaphoresis, fever and malaise/fatigue  HENT: Negative  Eyes: Negative  Cardiovascular: Positive for leg swelling  Negative for chest pain, dyspnea on exertion and orthopnea  Respiratory: Negative  Negative for cough, shortness of breath, sputum production and wheezing  Gastrointestinal: Negative for abdominal pain, diarrhea, nausea and vomiting  Genitourinary: Positive for bladder incontinence  Negative for dysuria, flank pain and hematuria  Neurological: Negative for dizziness, focal weakness, headaches and weakness  Psychiatric/Behavioral: Negative for altered mental status, depression, hallucinations and hypervigilance  OBJECTIVE:  Current Weight: Weight - Scale: 71 7 kg (158 lb)  [email or web address]:     Blood pressure 120/78, pulse 70, height 4' 11" (1 499 m), weight 71 7 kg (158 lb), not currently breastfeeding  , Body mass index is 31 91 kg/m²  [unfilled]    Physical Exam: /78 (BP Location: Left arm, Patient Position: Sitting, Cuff Size: Standard)   Pulse 70   Ht 4' 11" (1 499 m)   Wt 71 7 kg (158 lb)   LMP  (LMP Unknown)   BMI 31 91 kg/m²   Physical Exam  Constitutional:       General: She is not in acute distress  Appearance: She is not toxic-appearing or diaphoretic  HENT:      Head: Normocephalic and atraumatic  Nose: Nose normal       Mouth/Throat:      Mouth: Mucous membranes are moist    Eyes:      General: No scleral icterus  Extraocular Movements: Extraocular movements intact  Cardiovascular:      Rate and Rhythm: Normal rate and regular rhythm  Heart sounds: No friction rub  No gallop  Comments: Positive edema  Pulmonary:      Effort: Pulmonary effort is normal  No respiratory distress  Breath sounds: No wheezing, rhonchi or rales  Abdominal:      General: Bowel sounds are normal  There is no distension  Palpations: Abdomen is soft  Tenderness: There is no abdominal tenderness  There is no rebound  Musculoskeletal:      Cervical back: Normal range of motion and neck supple  Neurological:      General: No focal deficit present  Mental Status: She is alert and oriented to person, place, and time  Mental status is at baseline  Psychiatric:         Mood and Affect: Mood normal          Behavior: Behavior normal          Thought Content:  Thought content normal          Judgment: Judgment normal          Medications:    Current Outpatient Medications:     acetaminophen (TYLENOL) 325 mg tablet, Take 2 tablets (650 mg total) by mouth every 6 (six) hours as needed for mild pain, Disp: , Rfl: 0    albuterol (2 5 mg/3 mL) 0 083 % nebulizer solution, Take 1 vial (2 5 mg total) by nebulization 3 (three) times a day (Patient taking differently: Take 2 5 mg by nebulization 3 (three) times a day as needed), Disp: 25 vial, Rfl: 3    albuterol (PROVENTIL HFA,VENTOLIN HFA) 90 mcg/act inhaler, Inhale 2 puffs every 4 (four) hours as needed for wheezing, Disp: , Rfl: 0    allopurinol (ZYLOPRIM) 100 mg tablet, Take 1 5 tablets by mouth daily , Disp: , Rfl:     Ascorbic Acid (vitamin C) 1000 MG tablet, Take 1,000 mg by mouth daily, Disp: , Rfl:     calcium carbonate (OS-EMILY) 1250 (500 Ca) MG tablet, Take 1 tablet by mouth daily, Disp: , Rfl:     clopidogrel (PLAVIX) 75 mg tablet, TAKE 1 TABLET DAILY, Disp: 90 tablet, Rfl: 3    clopidogrel (PLAVIX) 75 mg tablet, Take 1 tablet (75 mg total) by mouth daily, Disp: , Rfl: 0    docusate sodium (COLACE) 100 mg capsule, Take 100 mg by mouth 2 (two) times a day as needed for constipation , Disp: , Rfl:     docusate sodium (COLACE) 100 mg capsule, Take 1 capsule (100 mg total) by mouth 2 (two) times a day, Disp: , Rfl: 0    fenofibrate (FENOGLIDE) 120 MG TABS, Take 1 tablet (120 mg total) by mouth daily, Disp: 90 tablet, Rfl: 3    gabapentin (NEURONTIN) 300 mg capsule, Take 1 capsule (300 mg total) by mouth 3 (three) times a day, Disp: , Rfl: 0    HYDROcodone-acetaminophen (NORCO) 5-325 mg per tablet, Take 1 tablet by mouth every 6 (six) hours as needed for pain Max Daily Amount: 4 tablets, Disp: 6 tablet, Rfl: 0    isosorbide mononitrate (IMDUR) 30 mg 24 hr tablet, Take 1 tablet (30 mg total) by mouth daily, Disp: , Rfl: 0    levothyroxine 112 mcg tablet, Take 112 mcg by mouth daily , Disp: , Rfl:     lidocaine (LIDODERM) 5 %, Apply 1 patch topically as needed Remove & Discard patch within 12 hours or as directed by MD , Disp: , Rfl:     lidocaine (LIDODERM) 5 %, Apply 1 patch topically daily Remove & Discard patch within 12 hours or as directed by MD, Disp: , Rfl: 0    Lumigan 0 01 % ophthalmic drops, Administer 1 drop to both eyes daily at bedtime  , Disp: , Rfl:     methenamine hippurate (HIPREX) 1 g tablet, Take 1 g by mouth 2 (two) times a day with meals, Disp: , Rfl:     methocarbamol (ROBAXIN) 500 mg tablet, Take 500 mg by mouth 3 (three) times a day as needed for muscle spasms, Disp: , Rfl:     metoprolol tartrate (LOPRESSOR) 25 mg tablet, TAKE 1 TABLET TWICE A DAY, Disp: 180 tablet, Rfl: 3    Multiple Vitamins-Calcium (MULTI-DAY/CALCIUM/EXTRA IRON PO), Take 1 tablet by mouth daily, Disp: , Rfl:     Multiple Vitamins-Minerals (multivitamin with iron-minerals) liquid, Take 15 mL by mouth daily, Disp: , Rfl: 0    nitroglycerin (NITROSTAT) 0 4 mg SL tablet, , Disp: , Rfl:     Red Yeast Rice Extract (RED YEAST RICE PO), Take 600 mg by mouth daily, Disp: , Rfl:     sertraline (ZOLOFT) 25 mg tablet, 25 mg daily at bedtime , Disp: , Rfl:     torsemide (DEMADEX) 20 mg tablet, Take 1 tablet (20 mg total) by mouth 2 (two) times a day May take one extra dose as needed for wt gain,increased sob or increased edema , Disp: 180 tablet, Rfl: 3    albuterol (PROVENTIL HFA,VENTOLIN HFA) 90 mcg/act inhaler, Inhale 2 puffs every 4 (four) hours as needed for wheezing, Disp: , Rfl:     allopurinol (ZYLOPRIM) 300 mg tablet, Take 0 5 tablets (150 mg total) by mouth daily, Disp: , Rfl: 0    calcium carbonate (TUMS) 500 mg chewable tablet, Chew 2 tablets (1,000 mg total) daily as needed for indigestion or heartburn, Disp: , Rfl: 0    colchicine (COLCRYS) 0 6 mg tablet, Take 0 6 mg by mouth see administration instructions Every 3 days (Patient not taking: Reported on 4/8/2022 ), Disp: , Rfl:     enoxaparin (LOVENOX) 30 mg/0 3 mL, Inject 0 3 mL (30 mg total) under the skin daily for 21 days, Disp: , Rfl: 0    gabapentin (NEURONTIN) 300 mg capsule, Take 300 mg by mouth 2 (two) times a day Take 300 mg in am and 600 mg at bedtime, Disp: , Rfl:     isosorbide mononitrate (IMDUR) 30 mg 24 hr tablet, TAKE 1 TABLET DAILY, Disp: 90 tablet, Rfl: 3    levothyroxine 112 mcg tablet, Take 1 tablet (112 mcg total) by mouth daily in the early morning, Disp: , Rfl: 0    methocarbamol (ROBAXIN) 750 mg tablet, Take 1 tablet (750 mg total) by mouth every 6 (six) hours as needed for muscle spasms, Disp: , Rfl: 0    metolazone (ZAROXOLYN) 5 mg tablet, Take 1 tablet (5 mg total) by mouth as needed (if gains 2lbs in 2 days ) (Patient not taking: Reported on 8/27/2021), Disp: 15 tablet, Rfl: 0    metoprolol tartrate (LOPRESSOR) 25 mg tablet, Take 1 tablet (25 mg total) by mouth every 12 (twelve) hours, Disp: , Rfl: 0    Multiple Vitamin (multivitamin) capsule, Take 1 capsule by mouth daily (Patient not taking: Reported on 10/15/2021 ), Disp: , Rfl:     senna (SENOKOT) 8 6 mg, Take 1 tablet (8 6 mg total) by mouth daily, Disp: , Rfl: 0    sertraline (ZOLOFT) 25 mg tablet, Take 1 tablet (25 mg total) by mouth daily, Disp: , Rfl: 0    torsemide 40 MG TABS, Take 40 mg by mouth daily, Disp: , Rfl: 0    Laboratory Results:  Lab Results   Component Value Date    WBC 5 47 06/01/2022    HGB 8 8 (L) 06/01/2022    HCT 29 4 (L) 06/01/2022     (H) 06/01/2022     06/01/2022     Lab Results   Component Value Date    SODIUM 135 (L) 06/01/2022    K 4 3 06/01/2022    CL 96 (L) 06/01/2022    CO2 30 06/01/2022    BUN 70 (H) 06/01/2022    CREATININE 2 08 (H) 06/01/2022    GLUC 103 05/24/2022    CALCIUM 8 7 06/01/2022     Lab Results   Component Value Date    CALCIUM 8 7 06/01/2022    PHOS 4 0 05/24/2022     No results found for: LABPROT

## 2022-09-20 NOTE — PATIENT INSTRUCTIONS
You are here for follow-up to discuss your kidney function  The creatinine test most recently was 2 2 and I showed her going back a year it has been running around 2  I did not see evidence of aggressive kidney disease could you did not have protein in the urine which is a good thing you likely have nephrosclerosis or aging in the kidney and also partly due to some of the hardening of the arteries you had a renal stent placed in the past   Also your on medications to help avoid fluid buildup  All these can affect the kidney function but the good news is without protein in the urine it goes against having significant intrinsic kidney disease  At this point continue current medications  We will monitor labs as we discussed and I will contact you with these and then will follow-up in March unless we need to be seen sooner  We reviewed her medications and we discussed each 1 that you had questions about and I will let your doctors know that we saw you today and discussed her medications and the safety of them

## 2022-10-03 DIAGNOSIS — I50.32 CHRONIC DIASTOLIC CHF (CONGESTIVE HEART FAILURE), NYHA CLASS 2 (HCC): ICD-10-CM

## 2022-10-11 PROBLEM — N39.0 UTI (URINARY TRACT INFECTION): Status: RESOLVED | Noted: 2022-05-19 | Resolved: 2022-10-11

## 2022-10-31 ENCOUNTER — TELEPHONE (OUTPATIENT)
Dept: NEPHROLOGY | Facility: HOSPITAL | Age: 87
End: 2022-10-31

## 2022-12-27 NOTE — ASSESSMENT & PLAN NOTE
Due for repeat renal u/s  Was unable to be completed during hospitalization secondary to patient discomfort    Can be coordinated as OP Eucrisa Counseling: Patient may experience a mild burning sensation during topical application. Eucrisa is not approved in children less than 2 years of age.

## 2023-01-16 DIAGNOSIS — I10 ESSENTIAL HYPERTENSION: Chronic | ICD-10-CM

## 2023-01-16 NOTE — TELEPHONE ENCOUNTER
Requested medication(s) are due for refill today: Yes  Patient has already received a courtesy refill: No  Other reason request has been forwarded to provider: Gisselle clement

## 2023-01-17 RX ORDER — ISOSORBIDE MONONITRATE 30 MG/1
TABLET, EXTENDED RELEASE ORAL
Qty: 90 TABLET | Refills: 3 | Status: SHIPPED | OUTPATIENT
Start: 2023-01-17
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
## 2023-02-10 PROBLEM — E03.9 HYPOTHYROIDISM: Status: ACTIVE | Noted: 2023-02-10

## 2023-02-10 PROBLEM — N18.30 CKD (CHRONIC KIDNEY DISEASE) STAGE 3, GFR 30-59 ML/MIN (HCC): Status: ACTIVE | Noted: 2023-02-10

## 2023-02-10 PROBLEM — I50.9 CHF (CONGESTIVE HEART FAILURE) (HCC): Status: ACTIVE | Noted: 2023-02-10

## 2023-02-10 PROBLEM — T14.8XXA CONTUSION: Status: ACTIVE | Noted: 2023-02-10

## 2023-02-10 PROBLEM — S12.9XXA CERVICAL SPINE FRACTURE (HCC): Status: ACTIVE | Noted: 2023-02-10

## 2023-02-12 PROBLEM — S30.23XA: Status: ACTIVE | Noted: 2023-02-12

## 2023-02-12 PROBLEM — S22.009A THORACIC SPINE FRACTURE (HCC): Status: ACTIVE | Noted: 2023-02-12

## 2023-02-15 PROBLEM — N18.4 CKD (CHRONIC KIDNEY DISEASE) STAGE 4, GFR 15-29 ML/MIN (HCC): Status: ACTIVE | Noted: 2023-02-10

## 2023-02-15 PROBLEM — E11.40 TYPE 2 DIABETES MELLITUS WITH DIABETIC NEUROPATHY, WITHOUT LONG-TERM CURRENT USE OF INSULIN (HCC): Status: ACTIVE | Noted: 2023-02-15

## 2023-02-15 PROBLEM — E66.09 CLASS 1 OBESITY DUE TO EXCESS CALORIES WITHOUT SERIOUS COMORBIDITY WITH BODY MASS INDEX (BMI) OF 33.0 TO 33.9 IN ADULT: Status: ACTIVE | Noted: 2023-02-15

## 2023-02-15 PROBLEM — I10 PRIMARY HYPERTENSION: Status: ACTIVE | Noted: 2023-02-15

## 2023-03-01 ENCOUNTER — DOCUMENTATION (OUTPATIENT)
Dept: NEPHROLOGY | Facility: CLINIC | Age: 88
End: 2023-03-01

## 2023-03-01 PROBLEM — K64.9 HEMORRHOIDS: Status: ACTIVE | Noted: 2023-03-01

## 2023-03-01 NOTE — PROGRESS NOTES
Sent message via [employer] to see if patient was interested in rescheduling appointment on 03/07/23 that was cancelled via [address]

## 2023-03-15 DIAGNOSIS — I50.42 CHRONIC COMBINED SYSTOLIC AND DIASTOLIC CONGESTIVE HEART FAILURE (HCC): ICD-10-CM

## 2023-03-15 DIAGNOSIS — E03.9 HYPOTHYROIDISM, UNSPECIFIED TYPE: ICD-10-CM

## 2023-03-17 NOTE — ED NOTES
Spoke to pt daughter who will be by in 45 minutes to  patient     Racheal Lugo, ZENAIDA  02/[birth year]/37 [id number] 2 seconds or less

## 2023-03-22 ENCOUNTER — APPOINTMENT (OUTPATIENT)
Dept: RADIOLOGY | Age: 88
End: 2023-03-22

## 2023-03-22 DIAGNOSIS — S12.500A C6 CERVICAL FRACTURE (HCC): ICD-10-CM

## 2023-03-22 DIAGNOSIS — S12.100A C2 CERVICAL FRACTURE (HCC): ICD-10-CM

## 2023-03-23 ENCOUNTER — TELEPHONE (OUTPATIENT)
Dept: NEUROSURGERY | Facility: CLINIC | Age: 88
End: 2023-03-23

## 2023-03-24 ENCOUNTER — TELEPHONE (OUTPATIENT)
Dept: NEUROSURGERY | Facility: CLINIC | Age: 88
End: 2023-03-24

## 2023-03-27 ENCOUNTER — TELEMEDICINE (OUTPATIENT)
Dept: NEUROSURGERY | Facility: CLINIC | Age: 88
End: 2023-03-27

## 2023-03-27 ENCOUNTER — TELEPHONE (OUTPATIENT)
Dept: NEUROSURGERY | Facility: CLINIC | Age: 88
End: 2023-03-27

## 2023-03-27 DIAGNOSIS — S12.121D OTHER CLOSED NONDISPLACED ODONTOID FRACTURE WITH ROUTINE HEALING, SUBSEQUENT ENCOUNTER: Primary | ICD-10-CM

## 2023-03-27 RX ORDER — PREDNISONE 1 MG/1
5 TABLET ORAL DAILY
COMMUNITY

## 2023-03-27 RX ORDER — ACETAMINOPHEN 500 MG
1000 TABLET ORAL EVERY 6 HOURS PRN
COMMUNITY

## 2023-03-27 NOTE — ASSESSMENT & PLAN NOTE
· Pt had virtual visit today for approximately 2 week follow up for cervical spine fractures at C2 and C6    · Prior hx of anterior cervical spine fusion C1-C2 for fracture repair by Dr Micki Arnold  In 2019  · Pt being managed conservatively in Cervical brace  · Imaging reviewed personally  Final results below discussed with the patient  · Xray Cervical spine 3/22/23: Stable appearance of the cervical spine with a fracture through the bridging osteophyte at the level of C6 which is more visible on the current study  Stable postoperative changes status post C1-2 fusion  Anterior subluxation of C2 is stable  Straightening of the usual cervical lordosis  Plan  · Pain control with over-the-counter and/or prescribed medications as needed  · Recommend Cervical brace to be worn at all times, for showers switch to [place] collar  · Pt reported discomfort with the Cervical spine brace and reported that she was anxious to find out when she would be able to wean off the brace  Given the acute fractures including the C6 anterior osteophyte fractures are still visible on xray and pt is only approximately 6-7 weeks since initial injury, recommend pt continue to wear the Cervical brace at this time  Discussed with patient adjustments so the cervical brace fits better  · Patient to avoid bending, twisting or turning the neck  No lifting more than 10 lb  No driving while being treated in a brace  · At this time, no neurosurgical intervention is anticipated  Neurosurgery will continue to monitor patient  · Given pt has no neck pain at this time, pt will follow up in 3-4 weeks with flexion/extension xrays of the Cervical spine  At the next visit if pt remains without neck pain, could consider weaning off the brace if flex/ex xrays show no instability  · Patient/family to contact nsx sooner with any worsening neck pain, new numbness, tingling or weakness in extremities    · Patient verbalized understanding and was in agreement with the plan

## 2023-03-27 NOTE — PROGRESS NOTES
Virtual Regular Visit    Verification of patient location:    Patient is located in the following state in which I hold an active license [place]    Assessment/Plan:    Problem List Items Addressed This Visit        Musculoskeletal and Integument    Cervical spine fracture ([address] ) - Primary     · Pt had virtual visit today for approximately 2 week follow up for cervical spine fractures at C2 and C6    · Prior hx of anterior cervical spine fusion C1-C2 for fracture repair by Dr Ghazala Waller  In 2019  · Pt being managed conservatively in Cervical brace  · Imaging reviewed personally  Final results below discussed with the patient  · Xray Cervical spine 3/22/23: Stable appearance of the cervical spine with a fracture through the bridging osteophyte at the level of C6 which is more visible on the current study  Stable postoperative changes status post C1-2 fusion  Anterior subluxation of C2 is stable  Straightening of the usual cervical lordosis  Plan  · Pain control with over-the-counter and/or prescribed medications as needed  · Recommend Cervical brace to be worn at all times, for showers switch to [place] collar  · Pt reported discomfort with the Cervical spine brace and reported that she was anxious to find out when she would be able to wean off the brace  Given the acute fractures including the C6 anterior osteophyte fractures are still visible on xray and pt is only approximately 6-7 weeks since initial injury, recommend pt continue to wear the Cervical brace at this time  Discussed with patient adjustments so the cervical brace fits better  · Patient to avoid bending, twisting or turning the neck  No lifting more than 10 lb  No driving while being treated in a brace  · At this time, no neurosurgical intervention is anticipated  Neurosurgery will continue to monitor patient  · Given pt has no neck pain at this time, pt will follow up in 3-4 weeks with flexion/extension xrays of the Cervical spine   At the next visit if pt remains without neck pain, could consider weaning off the brace if flex/ex xrays show no instability  · Patient/family to contact nsx sooner with any worsening neck pain, new numbness, tingling or weakness in extremities  · Patient verbalized understanding and was in agreement with the plan             Relevant Orders    XR spine cervical 2 or 3 vw injury          Reason for visit is   Chief Complaint   Patient presents with   • Virtual Regular Visit      Encounter provider Kenzie Sheets PA-C    Provider located at [address] [place] [address]  [facility]  [address] [address] [facility] [id number]    Recent Visits  Date Type Provider Dept   03/24/23 Telephone [employer]   03/23/23 Telephone Rita [facility]un[facility]   Showing recent visits within past 7 days and meeting all other requirements  Today's Visits  Date Type Provider Dept   03/27/23 Telephone [employer] [facility]   [facility]   Showing today's visits and meeting all other requirements  Future Appointments  No visits were found meeting these conditions  Showing future appointments within next 150 days and meeting all other requirements       The patient was identified by name and date of birth  Amanda Diaz was informed that this is a telemedicine visit and that the visit is being conducted through the [employer]  She agrees to proceed     My office door was closed  No one else was in the room  She acknowledged consent and understanding of privacy and security of the video platform  The patient has agreed to participate and understands they can discontinue the visit at any time  Patient is aware this is a billable service       Subjective  Amanda Diaz is a 80 y o  female     With PMHx diabetes, hypothyroidism, CKD, recurrent UTIs, mild dementia/cognitive dysfunction, hx of C1-C2 fracture repair in 2019 by Dr Rosy Lopez and ambulatory dysfunction walking with a walker at baseline who had a visit today for approximately 6 week follow up for acute C2 and C6 fracture that extends into the right vertebral body and coursing obliquely through the inferior endplate at this level  Pt has been managed conservatively in a Cervical brace  Patient denies any neck pain today  She denies any new numbness, tingling or weakness in BUE/BLE  She denies changes in bowel or bladder function  She reports she continues to ambulate with her walker  She reports that she has been compliant with use of her Cervical brace though reports that she finds it to be uncomfortable  Past Medical History:   Diagnosis Date   • Arthritis    • Breast cancer ([facility] ) 09/08/2015    BILATERAL   • Cardiac disease     aortic valve transplant   • CHF (congestive heart failure) ([facility])    • Chronic kidney disease    • Compression fracture of body of thoracic vertebra ([facility])    • CVA (cerebral vascular accident) ([facility] )    • Diabetes mellitus ([facility] [facility] )    • Disease of thyroid gland    • Fibromyalgia, primary    • Gout    • H/O cervical fracture 01/09/2019   • Hyperlipidemia    • Hypertension    • Neuropathy    • AZUL (obstructive sleep apnea) 10/19/2019   • Pressure injury of skin    • Renal disorder     kidney stent   • Stroke [facility])    • UTI (urinary tract infection)     colinized bacteria         Past Surgical History:   Procedure Laterality Date   • AORTIC VALVE SURGERY     • BREAST LUMPECTOMY Right 09/08/2015   • BREAST LUMPECTOMY Left 09/08/2015   • BREAST SURGERY      lumpectomy for breast cancer   • BREAST SURGERY     • CARDIAC VALVE REPLACEMENT     • CATARACT EXTRACTION     • CERVICAL SPINE SURGERY     • CHOLECYSTECTOMY     • FACIAL/NECK BIOPSY Right 9/24/2020    Procedure: EXCISION CHEEK LESION X2 WITH FROZEN SECTION;  Surgeon: Prakash Hernandez DO;  Location: [facility] MAIN OR;  Service: Plastics • HYSTERECTOMY  1978   • IR THORACENTESIS  2/28/2020   • JOINT REPLACEMENT     • KIDNEY SURGERY      stent placed for kidney   • KNEE SURGERY     • OTHER SURGICAL HISTORY      abdominal aneurysm surgery   • VT ARTHRODESIS POSTERIOR ATLAS-AXIS C1-C2 N/A 5/1/2019    Procedure: C1-C2 lateral mass fixation fusion with possible sublaminar cables;  Surgeon: Rissa Gaffney MD;  Location: [facility] MAIN [facility];  Service: Neurosurgery   • REPLACEMENT TOTAL KNEE      left        Current Outpatient Medications   Medication Sig Dispense Refill   • acetaminophen (TYLENOL) 500 mg tablet Take 1,000 mg by mouth every 6 (six) hours as needed for mild pain     • albuterol (2 5 mg/3 mL) 0 083 % nebulizer solution Take 1 vial (2 5 mg total) by nebulization 3 (three) times a day (Patient taking differently: Take 2 5 mg by nebulization 3 (three) times a day as needed) 25 vial 3   • albuterol (PROVENTIL HFA,VENTOLIN HFA) 90 mcg/act inhaler Inhale 2 puffs every 4 (four) hours as needed for wheezing  0   • allopurinol (ZYLOPRIM) 100 mg tablet Take 1 5 tablets by mouth daily      • Ascorbic Acid (vitamin C) 1000 MG tablet Take 1,000 mg by mouth daily     • calcium carbonate (OS-EMILY) 1250 (500 Ca) MG tablet Take 1 tablet by mouth daily     • clopidogrel (PLAVIX) 75 mg tablet TAKE 1 TABLET DAILY 90 tablet 3   • docusate sodium (COLACE) 100 mg capsule Take 1 capsule (100 mg total) by mouth 2 (two) times a day (Patient taking differently: Take 100 mg by mouth 2 (two) times a day PRN)  0   • gabapentin (NEURONTIN) 300 mg capsule Take 1 capsule (300 mg total) by mouth 3 (three) times a day  0   • HYDROcodone-acetaminophen (NORCO) 5-325 mg per tablet Take 1 tablet by mouth every 6 (six) hours as needed for pain Max Daily Amount: 4 tablets 6 tablet 0   • hydrocortisone (ANUSOL-HC) 25 mg suppository Insert 25 mg into the rectum 2 (two) times a day as needed     • isosorbide mononitrate (IMDUR) 30 mg 24 hr tablet Take 1 tablet (30 mg total) by mouth daily  0 • levothyroxine 112 mcg tablet Take 112 mcg by mouth daily      • lidocaine (LIDODERM) 5 % Apply 1 patch topically as needed Remove & Discard patch within 12 hours or as directed by MD      • Lumigan 0 01 % ophthalmic drops Administer 1 drop to both eyes daily at bedtime       • methenamine hippurate (HIPREX) 1 g tablet Take 1 g by mouth 2 (two) times a day with meals     • methocarbamol (ROBAXIN) 500 mg tablet Take 500 mg by mouth 3 (three) times a day as needed for muscle spasms     • metoprolol tartrate (LOPRESSOR) 25 mg tablet TAKE 1 TABLET TWICE A  tablet 3   • metoprolol tartrate (LOPRESSOR) 25 mg tablet Take 25 mg by mouth every 12 (twelve) hours     • Multiple Vitamins-Calcium (MULTI-DAY/CALCIUM/EXTRA IRON PO) Take 1 tablet by mouth daily     • nitroglycerin (NITROSTAT) 0 4 mg SL tablet      • nystatin (MYCOSTATIN) powder Apply topically 2 (two) times a day 15 g 0   • predniSONE 5 mg tablet 5 mg in the morning     • Red Yeast Rice Extract (RED YEAST RICE PO) Take 600 mg by mouth daily     • sertraline (ZOLOFT) 25 mg tablet 25 mg daily at bedtime      • torsemide (DEMADEX) 20 mg tablet Take 1 tablet (20 mg total) by mouth 2 (two) times a day May take one extra dose as needed for wt gain,increased sob or increased edema   180 tablet 3   • acetaminophen (TYLENOL) 325 mg tablet Take 2 tablets (650 mg total) by mouth every 6 (six) hours as needed for mild pain (Patient not taking: Reported on 3/27/2023)  0   • acetaminophen (TYLENOL) 325 mg tablet Take 3 tablets (975 mg total) by mouth every 8 (eight) hours  0   • albuterol (2 5 mg/3 mL) 0 083 % nebulizer solution Take 2 5 mg by nebulization every 6 (six) hours as needed for wheezing or shortness of breath     • albuterol (PROVENTIL HFA,VENTOLIN HFA) 90 mcg/act inhaler Inhale 2 puffs every 4 (four) hours as needed for wheezing     • allopurinol (ZYLOPRIM) 300 mg tablet Take 0 5 tablets (150 mg total) by mouth daily  0   • allopurinol (ZYLOPRIM) 300 mg tablet Take 200 mg by mouth daily (Patient not taking: Reported on 3/27/2023)     • ascorbic acid (VITAMIN C) 500 MG tablet Take 500 mg by mouth daily (Patient not taking: Reported on 3/27/2023)     • calcium carbonate (TUMS) 500 mg chewable tablet Chew 2 tablets (1,000 mg total) daily as needed for indigestion or heartburn  0   • clopidogrel (PLAVIX) 75 mg tablet Take 1 tablet (75 mg total) by mouth daily  0   • clopidogrel (PLAVIX) 75 mg tablet Take 75 mg by mouth daily (Patient not taking: Reported on 3/27/2023)     • colchicine (COLCRYS) 0 6 mg tablet Take 0 6 mg by mouth see administration instructions Every 3 days (Patient not taking: Reported on 4/8/2022 )     • docusate sodium (COLACE) 100 mg capsule Take 100 mg by mouth 2 (two) times a day as needed for constipation  (Patient not taking: Reported on 3/27/2023)     • docusate sodium (COLACE) 100 mg capsule Take 1 capsule (100 mg total) by mouth 2 (two) times a day  0   • enoxaparin (LOVENOX) 30 mg/0 3 mL Inject 0 3 mL (30 mg total) under the skin daily for 21 days  0   • fenofibrate (FENOGLIDE) 120 MG TABS Take 1 tablet (120 mg total) by mouth daily (Patient not taking: Reported on 3/27/2023) 90 tablet 3   • gabapentin (NEURONTIN) 300 mg capsule Take 300 mg by mouth 2 (two) times a day Take 300 mg in am and 600 mg at bedtime     • gabapentin (NEURONTIN) 600 MG tablet Take 1 tablet (600 mg total) by mouth daily at bedtime (Patient not taking: Reported on 3/27/2023) 7 tablet 0   • HYDROcodone-acetaminophen (NORCO) 5-325 mg per tablet Take 1 tablet by mouth every 6 (six) hours as needed for pain (Patient not taking: Reported on 3/27/2023)     • isosorbide mononitrate (IMDUR) 30 mg 24 hr tablet TAKE 1 TABLET DAILY 90 tablet 3   • isosorbide mononitrate (IMDUR) 30 mg 24 hr tablet Take 30 mg by mouth daily     • levothyroxine 100 mcg tablet Take 1 tablet (100 mcg total) by mouth daily 30 tablet 0   • levothyroxine 112 mcg tablet Take 1 tablet (112 mcg total) by mouth daily in the early morning  0   • lidocaine (LIDODERM) 5 % Apply 1 patch topically daily Remove & Discard patch within 12 hours or as directed by MD  0   • lidocaine (LIDODERM) 5 % Apply 1 patch topically daily Remove & Discard patch within 12 hours or as directed by MD     • Lidocaine HCl (Aspercreme Lidocaine) 4 % CREA Apply topically every 6 (six) hours as needed     • methenamine hippurate (HIPREX) 1 g tablet Take 1 g by mouth 2 (two) times a day with meals     • methocarbamol (ROBAXIN) 500 mg tablet Take 1 tablet (500 mg total) by mouth every 6 (six) hours as needed for muscle spasms 20 tablet 0   • methocarbamol (ROBAXIN) 750 mg tablet Take 1 tablet (750 mg total) by mouth every 6 (six) hours as needed for muscle spasms  0   • metolazone (ZAROXOLYN) 5 mg tablet Take 1 tablet (5 mg total) by mouth as needed (if gains 2lbs in 2 days ) (Patient not taking: Reported on 8/27/2021) 15 tablet 0   • Multiple Vitamin (multivitamin) capsule Take 1 capsule by mouth daily (Patient not taking: Reported on 10/15/2021 )     • Multiple Vitamins-Minerals (multivitamin with iron-minerals) liquid Take 15 mL by mouth daily  0   • polyethylene glycol (MIRALAX) 17 g packet Take 17 g by mouth daily Do not start before February 15, 2023   0   • senna (SENOKOT) 8 6 mg Take 1 tablet (8 6 mg total) by mouth daily  0   • senna (SENOKOT) 8 6 mg Take 2 tablets (17 2 mg total) by mouth daily Do not start before February 15, 2023  (Patient not taking: Reported on 3/27/2023)  0   • sertraline (ZOLOFT) 25 mg tablet Take 1 tablet (25 mg total) by mouth daily  0   • sertraline (ZOLOFT) 50 mg tablet Take 50 mg by mouth daily at bedtime     • torsemide (DEMADEX) 20 mg tablet Take 2 tablets (40 mg total) by mouth 2 (two) times a day 120 tablet 0   • torsemide 40 MG TABS Take 40 mg by mouth daily  0     No current facility-administered medications for this visit          Allergies   Allergen Reactions   • Duloxetine Hcl Other (See Comments) and Hypertension   • Duloxetine Hcl      Cymbalta; Hemorrhagic stroke listed as reaction   • Duloxetine Hcl Hemorrhagic stroke and Hypertension   • Escitalopram      Other reaction(s): Urinary Retention   • Pregabalin      Other reaction(s): Hypertension   • Statins Myalgia   • Tramadol      Other reaction(s): Hypertension   • Triprolidine-Pse Other (See Comments)   • Anastrozole Abdominal Pain   • Anastrozole    • Anastrozole Other (See Comments)     Other    • Antihistamines, Diphenhydramine-Type    • Exemestane      Aromasin; Muscle pain & cramps   • Exemestane Other (See Comments)     Other    • Lexapro [Escitalopram]      Urinary retention   • Lexapro [Escitalopram] Other (See Comments)     Urinary retention   • Lyrica [Pregabalin]      hypertension   • Lyrica [Pregabalin] Hypertension   • Metformin      diarrhea   • Metformin Other (See Comments)     Other      • Oxycodone      Pt unsure     • Oxycodone Other (See Comments)     Other      • Pseudoephedrine Other (See Comments)     Other      • Statins      Muscle pain & cramps   • Statins Myalgia   • Tramadol      hypertension   • Tramadol Hypertension   • Triprolidine Other (See Comments)     Other      • Triprolidine-Pseudoephedrine    • Metformin Diarrhea       Review of Systems   Musculoskeletal: Positive for gait problem (ambulates slowly-uses walker )  1 month f/u Cspine xray   All other systems reviewed and are negative  Video Exam    There were no vitals filed for this visit  Physical Exam  Constitutional:       General: She is not in acute distress  HENT:      Head: Normocephalic  Neck:      Comments: Cervical brace in place  Pulmonary:      Effort: No respiratory distress  Neurological:      Mental Status: She is alert  Comments: Pt awake, speech is clear and comprehensible  Pt sitting upright, NAD  Able to raise BUE antigravity without difficulty      Psychiatric:         Behavior: Behavior normal           I spent 30 minutes with patient today in which greater than 50% of the time was spent in counseling/coordination of care regarding cervical spine fractures

## 2023-03-28 ENCOUNTER — TELEPHONE (OUTPATIENT)
Dept: NEUROSURGERY | Facility: CLINIC | Age: 88
End: 2023-03-28

## 2023-03-28 NOTE — TELEPHONE ENCOUNTER
3/28/23  L/M FOR DAUGHTER HAIRSH ADVISING PT NEEDS X RAY C SPINE IN 3 WEEKS AND VIRTUAL VISIT IN 4 WEEKS

## 2023-04-21 ENCOUNTER — APPOINTMENT (OUTPATIENT)
Dept: RADIOLOGY | Age: 88
End: 2023-04-21

## 2023-04-21 DIAGNOSIS — S12.121D OTHER CLOSED NONDISPLACED ODONTOID FRACTURE WITH ROUTINE HEALING, SUBSEQUENT ENCOUNTER: ICD-10-CM

## 2023-04-25 ENCOUNTER — TELEMEDICINE (OUTPATIENT)
Dept: NEUROSURGERY | Facility: CLINIC | Age: 88
End: 2023-04-25

## 2023-04-25 DIAGNOSIS — S12.9XXD CLOSED FRACTURE OF CERVICAL VERTEBRA, UNSPECIFIED CERVICAL VERTEBRAL LEVEL, SUBSEQUENT ENCOUNTER: Primary | ICD-10-CM

## 2023-04-25 NOTE — PROGRESS NOTES
Virtual Regular Visit    Verification of patient location:    Patient is located at Home in the following state in which I hold an active license [place]      Assessment/Plan:    Problem List Items Addressed This Visit        Musculoskeletal and Integument    Cervical spine fracture ([address] ) - Primary     · Presenting virtually for ongoing follow up of her cervical spine fractures at C2 and C6    · Prior hx of anterior cervical spine fusion C1-C2 for fracture repair by Dr Aminata Hays  In 2019  · Pt being managed conservatively in Cervical brace  Imaging:  · Xray Cervical spine 4/21/2023: Healing of the fracture site at the fused anterior osteophyte of C6  Stable cervical alignment with hypertrophic anterior osteophytes    Plan  · Pain control with over-the-counter and/or prescribed medications as needed  · Patient without neck pain and evidence of fracture healing of XR imaging  · Will clear patient to begin to wean from collar at this time  · Discussed with patient and daughter over the video call process of weaning from collar  · Patient/family to contact nsx sooner with any worsening neck pain, new numbness, tingling or weakness in extremities  · Patient verbalized understanding and was in agreement with the plan  · Follow up prn at this time                     Reason for visit is   Chief Complaint   Patient presents with   • Virtual Regular Visit     Cervical spine fractures, with X-rays        Encounter provider Jodi Torres PA-C    Provider located at [address] [place] [address]  [address] [address]  [address] [facility] [id number]      Recent Visits  No visits were found meeting these conditions    Showing recent visits within past 7 days and meeting all other requirements  Today's Visits  Date Type Provider Dept   04/25/23 [facility] Nichole Osuna PA-C  [facility]   Showing today's visits and meeting all other requirements  Future Appointments  No visits were found meeting these conditions  Showing future appointments within next 150 days and meeting all other requirements       The patient was identified by name and date of birth  Lilly Postal was informed that this is a telemedicine visit and that the visit is being conducted through the [employer]  She agrees to proceed     My office door was closed  No one else was in the room  She acknowledged consent and understanding of privacy and security of the video platform  The patient has agreed to participate and understands they can discontinue the visit at any time  Patient is aware this is a billable service  Subjective  Lilly Postal is a 80 y o  female With PMHx diabetes, hypothyroidism, CKD, recurrent UTIs, mild dementia/cognitive dysfunction, hx of C1-C2 fracture repair in 2019 by Dr Jaron Claros and ambulatory dysfunction walking with a walker at baseline who had a visit today for approximately 10 week follow up for acute C2 and C6 fracture  Pt has been managed conservatively in a Cervical brace  Patient denies any neck pain  She denies any new numbness, tingling or weakness in BUE/BLE  She denies changes in bowel or bladder function  She reports she continues to ambulate with her walker  She reports that she has been compliant with use of her Cervical brace though reports that she finds it to be uncomfortable  She is complaining of trigeminal neuralgia type pain related to the pressure that the collar is exerting around here ear on the side of her face  Patient and daughter state this happened the last time she had to wear the collar as well          Past Medical History:   Diagnosis Date   • Arthritis    • Breast cancer ([facility] [address] ) 09/08/2015    BILATERAL   • Cardiac disease     aortic valve transplant   • CHF (congestive heart failure) ([facility])    • Chronic kidney disease    • Compression fracture of body of thoracic vertebra ([facility])    • CVA (cerebral vascular accident) [facility])    • Diabetes mellitus ([facility] [address] )    • Disease of thyroid gland    • Fibromyalgia, primary    • Gout    • H/O cervical fracture 01/09/2019   • Hyperlipidemia    • Hypertension    • Neuropathy    • AZUL (obstructive sleep apnea) 10/19/2019   • Pressure injury of skin    • Renal disorder     kidney stent   • Stroke [facility])    • UTI (urinary tract infection)     colinized bacteria         Past Surgical History:   Procedure Laterality Date   • AORTIC VALVE SURGERY     • BREAST LUMPECTOMY Right 09/08/2015   • BREAST LUMPECTOMY Left 09/08/2015   • BREAST SURGERY      lumpectomy for breast cancer   • BREAST SURGERY     • CARDIAC VALVE REPLACEMENT     • CATARACT EXTRACTION     • CERVICAL SPINE SURGERY     • CHOLECYSTECTOMY     • FACIAL/NECK BIOPSY Right 9/24/2020    Procedure: EXCISION CHEEK LESION X2 WITH FROZEN SECTION;  Surgeon: Kimmy Corado DO;  Location: [facility] MAIN OR;  Service: Plastics   • HYSTERECTOMY  1978   • IR THORACENTESIS  2/28/2020   • JOINT REPLACEMENT     • KIDNEY SURGERY      stent placed for kidney   • KNEE SURGERY     • OTHER SURGICAL HISTORY      abdominal aneurysm surgery   • VT ARTHRODESIS POSTERIOR ATLAS-AXIS C1-C2 N/A 5/1/2019    Procedure: C1-C2 lateral mass fixation fusion with possible sublaminar cables;  Surgeon: Facundo Sharma MD;  Location: [facility] MAIN [facility];  Service: Neurosurgery   • REPLACEMENT TOTAL KNEE      left        Current Outpatient Medications   Medication Sig Dispense Refill   • acetaminophen (TYLENOL) 500 mg tablet Take 1,000 mg by mouth every 6 (six) hours as needed for mild pain     • albuterol (2 5 mg/3 mL) 0 083 % nebulizer solution Take 1 vial (2 5 mg total) by nebulization 3 (three) times a day (Patient taking differently: Take 2 5 mg by nebulization 3 (three) times a day as needed) 25 vial 3   • albuterol (PROVENTIL HFA,VENTOLIN HFA) 90 mcg/act inhaler Inhale 2 puffs every 4 (four) hours as needed for wheezing  0   • allopurinol (ZYLOPRIM) 100 mg tablet Take 1 5 tablets by mouth daily      • Ascorbic Acid (vitamin C) 1000 MG tablet Take 1,000 mg by mouth daily     • calcium carbonate (OS-EMILY) 1250 (500 Ca) MG tablet Take 1 tablet by mouth daily     • clopidogrel (PLAVIX) 75 mg tablet TAKE 1 TABLET DAILY 90 tablet 3   • colchicine (COLCRYS) 0 6 mg tablet Take 0 6 mg by mouth as needed Every 3 days     • docusate sodium (COLACE) 100 mg capsule Take 100 mg by mouth 2 (two) times a day as needed for constipation     • docusate sodium (COLACE) 100 mg capsule Take 1 capsule (100 mg total) by mouth 2 (two) times a day (Patient taking differently: Take 100 mg by mouth 2 (two) times a day PRN)  0   • gabapentin (NEURONTIN) 300 mg capsule Take 1 capsule (300 mg total) by mouth 3 (three) times a day  0   • HYDROcodone-acetaminophen (NORCO) 5-325 mg per tablet Take 1 tablet by mouth every 6 (six) hours as needed for pain Max Daily Amount: 4 tablets 6 tablet 0   • hydrocortisone (ANUSOL-HC) 25 mg suppository Insert 25 mg into the rectum 2 (two) times a day as needed     • isosorbide mononitrate (IMDUR) 30 mg 24 hr tablet TAKE 1 TABLET DAILY 90 tablet 3   • levothyroxine 112 mcg tablet Take 112 mcg by mouth daily      • lidocaine (LIDODERM) 5 % Apply 1 patch topically as needed Remove & Discard patch within 12 hours or as directed by MD      • Lumigan 0 01 % ophthalmic drops Administer 1 drop to both eyes daily at bedtime       • methenamine hippurate (HIPREX) 1 g tablet Take 1 g by mouth 2 (two) times a day with meals     • methocarbamol (ROBAXIN) 500 mg tablet Take 500 mg by mouth 3 (three) times a day as needed for muscle spasms     • metoprolol tartrate (LOPRESSOR) 25 mg tablet TAKE 1 TABLET TWICE A  tablet 3   • Multiple Vitamins-Calcium (MULTI-DAY/CALCIUM/EXTRA IRON PO) Take 1 tablet by mouth daily     • nitroglycerin (NITROSTAT) 0 4 mg SL tablet      • nystatin (MYCOSTATIN) powder Apply topically 2 (two) times a day 15 g 0   • predniSONE 5 mg tablet 5 mg as needed     • Red Yeast Rice Extract (RED YEAST RICE PO) Take 600 mg by mouth daily     • sertraline (ZOLOFT) 25 mg tablet 25 mg daily at bedtime      • torsemide (DEMADEX) 20 mg tablet Take 2 tablets (40 mg total) by mouth 2 (two) times a day 120 tablet 0   • acetaminophen (TYLENOL) 325 mg tablet Take 2 tablets (650 mg total) by mouth every 6 (six) hours as needed for mild pain (Patient not taking: Reported on 4/25/2023)  0   • acetaminophen (TYLENOL) 325 mg tablet Take 3 tablets (975 mg total) by mouth every 8 (eight) hours (Patient not taking: Reported on 4/25/2023)  0   • albuterol (2 5 mg/3 mL) 0 083 % nebulizer solution Take 2 5 mg by nebulization every 6 (six) hours as needed for wheezing or shortness of breath     • albuterol (PROVENTIL HFA,VENTOLIN HFA) 90 mcg/act inhaler Inhale 2 puffs every 4 (four) hours as needed for wheezing     • allopurinol (ZYLOPRIM) 300 mg tablet Take 0 5 tablets (150 mg total) by mouth daily  0   • allopurinol (ZYLOPRIM) 300 mg tablet Take 200 mg by mouth daily     • ascorbic acid (VITAMIN C) 500 MG tablet Take 500 mg by mouth daily     • calcium carbonate (TUMS) 500 mg chewable tablet Chew 2 tablets (1,000 mg total) daily as needed for indigestion or heartburn  0   • clopidogrel (PLAVIX) 75 mg tablet Take 1 tablet (75 mg total) by mouth daily  0   • clopidogrel (PLAVIX) 75 mg tablet Take 75 mg by mouth daily     • docusate sodium (COLACE) 100 mg capsule Take 1 capsule (100 mg total) by mouth 2 (two) times a day  0   • enoxaparin (LOVENOX) 30 mg/0 3 mL Inject 0 3 mL (30 mg total) under the skin daily for 21 days  0   • fenofibrate (FENOGLIDE) 120 MG TABS Take 1 tablet (120 mg total) by mouth daily (Patient not taking: Reported on 3/27/2023) 90 tablet 3   • gabapentin (NEURONTIN) 300 mg capsule Take 300 mg by mouth 2 (two) times a day Take 300 mg in am and 600 mg at bedtime     • gabapentin (NEURONTIN) 600 MG tablet Take 1 tablet (600 mg total) by mouth daily at bedtime (Patient not taking: Reported on 3/27/2023) 7 tablet 0   • HYDROcodone-acetaminophen (NORCO) 5-325 mg per tablet Take 1 tablet by mouth every 6 (six) hours as needed for pain     • isosorbide mononitrate (IMDUR) 30 mg 24 hr tablet Take 1 tablet (30 mg total) by mouth daily  0   • isosorbide mononitrate (IMDUR) 30 mg 24 hr tablet Take 30 mg by mouth daily     • levothyroxine 100 mcg tablet Take 1 tablet (100 mcg total) by mouth daily (Patient not taking: Reported on 4/25/2023) 30 tablet 0   • levothyroxine 112 mcg tablet Take 1 tablet (112 mcg total) by mouth daily in the early morning  0   • lidocaine (LIDODERM) 5 % Apply 1 patch topically daily Remove & Discard patch within 12 hours or as directed by MD  0   • lidocaine (LIDODERM) 5 % Apply 1 patch topically daily Remove & Discard patch within 12 hours or as directed by MD     • Lidocaine HCl (Aspercreme Lidocaine) 4 % CREA Apply topically every 6 (six) hours as needed     • methenamine hippurate (HIPREX) 1 g tablet Take 1 g by mouth 2 (two) times a day with meals     • methocarbamol (ROBAXIN) 500 mg tablet Take 1 tablet (500 mg total) by mouth every 6 (six) hours as needed for muscle spasms 20 tablet 0   • methocarbamol (ROBAXIN) 750 mg tablet Take 1 tablet (750 mg total) by mouth every 6 (six) hours as needed for muscle spasms  0   • metolazone (ZAROXOLYN) 5 mg tablet Take 1 tablet (5 mg total) by mouth as needed (if gains 2lbs in 2 days ) (Patient not taking: Reported on 8/27/2021) 15 tablet 0   • metoprolol tartrate (LOPRESSOR) 25 mg tablet Take 25 mg by mouth every 12 (twelve) hours     • Multiple Vitamin (multivitamin) capsule Take 1 capsule by mouth daily (Patient not taking: Reported on 10/15/2021 )     • Multiple Vitamins-Minerals (multivitamin with iron-minerals) liquid Take 15 mL by mouth daily  0   • polyethylene glycol (MIRALAX) 17 g packet Take 17 g by mouth daily Do not start before February 15, 2023   0   • senna (SENOKOT) 8 6 mg Take 1 tablet (8 6 mg total) by mouth daily  0   • senna (SENOKOT) 8 6 mg Take 2 tablets (17 2 mg total) by mouth daily Do not start before February 15, 2023  (Patient not taking: Reported on 3/27/2023)  0   • sertraline (ZOLOFT) 25 mg tablet Take 1 tablet (25 mg total) by mouth daily  0   • sertraline (ZOLOFT) 50 mg tablet Take 50 mg by mouth daily at bedtime (Patient not taking: Reported on 4/25/2023)     • torsemide (DEMADEX) 20 mg tablet Take 1 tablet (20 mg total) by mouth 2 (two) times a day May take one extra dose as needed for wt gain,increased sob or increased edema  180 tablet 3   • torsemide 40 MG TABS Take 40 mg by mouth daily  0     No current facility-administered medications for this visit  Allergies   Allergen Reactions   • Duloxetine Hcl Other (See Comments) and Hypertension   • Duloxetine Hcl      Cymbalta;  Hemorrhagic stroke listed as reaction   • Duloxetine Hcl Hemorrhagic stroke and Hypertension   • Escitalopram      Other reaction(s): Urinary Retention   • Pregabalin      Other reaction(s): Hypertension   • Statins Myalgia   • Tramadol      Other reaction(s): Hypertension   • Triprolidine-Pse Other (See Comments)   • Anastrozole Abdominal Pain   • Anastrozole    • Anastrozole Other (See Comments)     Other    • Antihistamines, Diphenhydramine-Type    • Exemestane      Aromasin; Muscle pain & cramps   • Exemestane Other (See Comments)     Other    • Lexapro [Escitalopram]      Urinary retention   • Lexapro [Escitalopram] Other (See Comments)     Urinary retention   • Lyrica [Pregabalin]      hypertension   • Lyrica [Pregabalin] Hypertension   • Metformin      diarrhea   • Metformin Other (See Comments)     Other      • Oxycodone      Pt unsure     • Oxycodone Other (See Comments)     Other      • Pseudoephedrine Other (See Comments)     Other      • Statins      Muscle pain & cramps   • Statins Myalgia   • Tramadol      hypertension   • Tramadol Hypertension   • Triprolidine Other (See Comments)     Other      • Triprolidine-Pseudoephedrine    • Metformin Diarrhea       Review of Systems   HENT: Negative for trouble swallowing  Musculoskeletal: Positive for gait problem (using walker)  Negative for neck pain  Skin: Negative  Neurological: Positive for weakness (legs) and headaches (occasional, left temporal)  Negative for dizziness and light-headedness  TN facial pain, left sided, behind ear, in to cheek, temple and sometimes jaw  Believes is worsened by cervical collar pressure  Neuropathy, not new       Video Exam    There were no vitals filed for this visit  Physical Exam  Constitutional:       Appearance: Normal appearance  She is well-developed  HENT:      Head: Atraumatic  Eyes:      Extraocular Movements: Extraocular movements intact  Neck:      Vascular: No JVD  Trachea: No tracheal deviation  Comments: DROM with collar in place although ill fitting   Pulmonary:      Effort: Pulmonary effort is normal  No respiratory distress  Musculoskeletal:         General: Normal range of motion  Skin:     General: Skin is warm and dry  Neurological:      Mental Status: She is alert  Deep Tendon Reflexes: Reflexes are normal and symmetric  Comments: Appointment completed virtually  No apparent CN deficits  No focal weakness  No subjective complaints of altered sensation or weakness  Psychiatric:         Behavior: Behavior normal          Thought Content:  Thought content normal           Visit Time  Total Visit Duration: 10

## 2023-04-25 NOTE — ASSESSMENT & PLAN NOTE
· Presenting virtually for ongoing follow up of her cervical spine fractures at C2 and C6    · Prior hx of anterior cervical spine fusion C1-C2 for fracture repair by Dr Reggie Gomez  In 2019  · Pt being managed conservatively in Cervical brace  Imaging:  · Xray Cervical spine 4/21/2023: Healing of the fracture site at the fused anterior osteophyte of C6  Stable cervical alignment with hypertrophic anterior osteophytes    Plan  · Pain control with over-the-counter and/or prescribed medications as needed  · Patient without neck pain and evidence of fracture healing of XR imaging  · Will clear patient to begin to wean from collar at this time  · Discussed with patient and daughter over the video call process of weaning from collar  · Patient/family to contact nsx sooner with any worsening neck pain, new numbness, tingling or weakness in extremities    · Patient verbalized understanding and was in agreement with the plan  · Follow up prn at this time

## 2023-05-05 DIAGNOSIS — Z86.73 H/O: CVA (CEREBROVASCULAR ACCIDENT): Chronic | ICD-10-CM

## 2023-05-05 RX ORDER — CLOPIDOGREL BISULFATE 75 MG/1
TABLET ORAL
Qty: 90 TABLET | Refills: 3 | Status: SHIPPED | OUTPATIENT
Start: 2023-05-05

## 2023-06-13 RX ORDER — TORSEMIDE 20 MG/1
TABLET ORAL
Qty: 360 TABLET | Refills: 1 | OUTPATIENT
Start: 2023-06-13

## 2023-06-13 RX ORDER — LEVOTHYROXINE SODIUM 0.1 MG/1
TABLET ORAL
Qty: 90 TABLET | Refills: 1 | OUTPATIENT
Start: 2023-06-13

## 2023-06-20 NOTE — ANESTHESIA PREPROCEDURE EVALUATION
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 chronic kidney disease      HEMATOLOGY   (+) Chronic anemia      MUSCULOSKELETAL   (+) RA (rheumatoid arthritis) (HCC)   (+) SLE (systemic lupus erythematosus) (HCC)      NEURO/PSYCH   (+) Depression   (+) Encounter for follow-up examination after completed treatment for malignant neoplasm   (+) H/O: CVA (cerebrovascular accident) (RLE weakness)   (+) History of malignant neoplasm of breast      PULMONARY  O2 for sleep   (+) Acute on chronic respiratory failure with hypoxia (HCC)   (+) AZUL (obstructive sleep apnea) (does not use currently)      Other   (+) Peripheral polyneuropathy        Physical Exam    Airway    Mallampati score: II  TM Distance: >3 FB  Neck ROM: full     Dental   No notable dental hx     Cardiovascular  Cardiovascular exam normal    Pulmonary  Pulmonary exam normal     Other Findings        Anesthesia Plan  ASA Score- 3     Anesthesia Type- IV sedation with anesthesia with ASA Monitors  Additional Monitors:   Airway Plan:           Plan Factors-    Induction-     Postoperative Plan-     Informed Consent- Anesthetic plan and risks discussed with patient  I personally reviewed this patient with the CRNA  Discussed and agreed on the Anesthesia Plan with the CRNA  Karissa Hussein
20-Jun-2023 21:48

## 2023-07-12 ENCOUNTER — TELEPHONE (OUTPATIENT)
Dept: NEPHROLOGY | Facility: CLINIC | Age: 88
End: 2023-07-12

## 2023-07-12 NOTE — TELEPHONE ENCOUNTER
Received a call from patient's daughterTeri stating that patient PCP told her to call Dr. Carroll Gonsalves- if is okay for patient to continue taking Gabapentin 300 mg, patient take 3 times a day. Dr. Carroll Gonsalves,    Please advise.

## 2023-07-12 NOTE — TELEPHONE ENCOUNTER
Called Anastasia Garcia and left a voicemail with the following information: Tell them yes as long as not having side effects from med. Not a risk to her kidneys. Advised to please call the office to let us know she received the message and if have any questions or concerns.

## 2023-08-16 NOTE — PLAN OF CARE
Problem: Prexisting or High Potential for Compromised Skin Integrity  Goal: Skin integrity is maintained or improved  Description: INTERVENTIONS:  - Identify patients at risk for skin breakdown  - Assess and monitor skin integrity  - Assess and monitor nutrition and hydration status  - Monitor labs   - Assess for incontinence   - Turn and reposition patient  - Assist with mobility/ambulation  - Relieve pressure over bony prominences  - Avoid friction and shearing  - Provide appropriate hygiene as needed including keeping skin clean and dry  - Evaluate need for skin moisturizer/barrier cream  - Collaborate with interdisciplinary team   - Patient/family teaching  - Consider wound care consult   Outcome: Progressing     Problem: PAIN - ADULT  Goal: Verbalizes/displays adequate comfort level or baseline comfort level  Description: Interventions:  - Encourage patient to monitor pain and request assistance  - Assess pain using appropriate pain scale  - Administer analgesics based on type and severity of pain and evaluate response  - Implement non-pharmacological measures as appropriate and evaluate response  - Consider cultural and social influences on pain and pain management  - Notify physician/advanced practitioner if interventions unsuccessful or patient reports new pain  Outcome: Progressing     Problem: Nutrition/Hydration-ADULT  Goal: Nutrient/Hydration intake appropriate for improving, restoring or maintaining nutritional needs  Description: Monitor and assess patient's nutrition/hydration status for malnutrition  Collaborate with interdisciplinary team and initiate plan and interventions as ordered  Monitor patient's weight and dietary intake as ordered or per policy  Utilize nutrition screening tool and intervene as necessary  Determine patient's food preferences and provide high-protein, high-caloric foods as appropriate       INTERVENTIONS:  - Monitor oral intake, urinary output, labs, and treatment plans  - Assess nutrition and hydration status and recommend course of action  - Evaluate amount of meals eaten  - Assist patient with eating if necessary   - Allow adequate time for meals  - Recommend/ encourage appropriate diets, oral nutritional supplements, and vitamin/mineral supplements  - Order, calculate, and assess calorie counts as needed  - Recommend, monitor, and adjust tube feedings and TPN/PPN based on assessed needs  - Assess need for intravenous fluids  - Provide specific nutrition/hydration education as appropriate  - Include patient/family/caregiver in decisions related to nutrition  Outcome: Progressing     Problem: INFECTION - ADULT  Goal: Absence or prevention of progression during hospitalization  Description: INTERVENTIONS:  - Assess and monitor for signs and symptoms of infection  - Monitor lab/diagnostic results  - Monitor all insertion sites, i e  indwelling lines, tubes, and drains  - Monitor endotracheal if appropriate and nasal secretions for changes in amount and color  - [place] appropriate cooling/warming therapies per order  - Administer medications as ordered  - Instruct and encourage patient and family to use good hand hygiene technique  - Identify and instruct in appropriate isolation precautions for identified infection/condition  Outcome: Progressing     Problem: DISCHARGE PLANNING  Goal: Discharge to home or other facility with appropriate resources  Description: INTERVENTIONS:  - Identify barriers to discharge w/patient and caregiver  - Arrange for needed discharge resources and transportation as appropriate  - Identify discharge learning needs (meds, wound care, etc )  - Arrange for interpretive services to assist at discharge as needed  - Refer to Case Management Department for coordinating discharge planning if the patient needs post-hospital services based on physician/advanced practitioner order or complex needs related to functional status, cognitive ability, or social support system  Outcome: Progressing     Problem: Knowledge Deficit  Goal: Patient/family/caregiver demonstrates understanding of disease process, treatment plan, medications, and discharge instructions  Description: Complete learning assessment and assess knowledge base    Interventions:  - Provide teaching at level of understanding  - Provide teaching via preferred learning methods  Outcome: Progressing     Problem: RESPIRATORY - ADULT  Goal: Achieves optimal ventilation and oxygenation  Description: INTERVENTIONS:  - Assess for changes in respiratory status  - Assess for changes in mentation and behavior  - Position to facilitate oxygenation and minimize respiratory effort  - Oxygen administered by appropriate delivery if ordered  - Initiate smoking cessation education as indicated  - Encourage broncho-pulmonary hygiene including cough, deep breathe, Incentive Spirometry  - Assess the need for suctioning and aspirate as needed  - Assess and instruct to report SOB or any respiratory difficulty  - Respiratory Therapy support as indicated  Outcome: Progressing     Problem: METABOLIC, FLUID AND ELECTROLYTES - ADULT  Goal: Electrolytes maintained within normal limits  Description: INTERVENTIONS:  - Monitor labs and assess patient for signs and symptoms of electrolyte imbalances  - Administer electrolyte replacement as ordered  - Monitor response to electrolyte replacements, including repeat lab results as appropriate  - Instruct patient on fluid and nutrition as appropriate  Outcome: Progressing  Goal: Fluid balance maintained  Description: INTERVENTIONS:  - Monitor labs   - Monitor I/O and WT  - Instruct patient on fluid and nutrition as appropriate  - Assess for signs & symptoms of volume excess or deficit  Outcome: Progressing cough

## 2023-09-04 ENCOUNTER — APPOINTMENT (EMERGENCY)
Dept: RADIOLOGY | Facility: HOSPITAL | Age: 88
DRG: 690 | End: 2023-09-04
Payer: COMMERCIAL

## 2023-09-04 ENCOUNTER — HOSPITAL ENCOUNTER (INPATIENT)
Facility: HOSPITAL | Age: 88
LOS: 4 days | Discharge: HOME HEALTH | DRG: 690 | End: 2023-09-08
Attending: SURGERY | Admitting: INTERNAL MEDICINE
Payer: COMMERCIAL

## 2023-09-04 DIAGNOSIS — G62.9 PERIPHERAL POLYNEUROPATHY: ICD-10-CM

## 2023-09-04 DIAGNOSIS — E03.9 HYPOTHYROID: ICD-10-CM

## 2023-09-04 DIAGNOSIS — M10.9 GOUT: ICD-10-CM

## 2023-09-04 DIAGNOSIS — E11.9 TYPE 2 DIABETES MELLITUS WITHOUT COMPLICATION, WITHOUT LONG-TERM CURRENT USE OF INSULIN (HCC): ICD-10-CM
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
## 2023-09-04 DIAGNOSIS — R19.7 DIARRHEA: ICD-10-CM

## 2023-09-04 DIAGNOSIS — L89.896 PRESSURE INJURY OF DEEP TISSUE OF OTHER SITE: Chronic | ICD-10-CM

## 2023-09-04 DIAGNOSIS — R55 NEAR SYNCOPE: ICD-10-CM

## 2023-09-04 PROBLEM — R50.9 FEBRILE ILLNESS: Status: ACTIVE | Noted: 2023-09-04

## 2023-09-04 PROBLEM — Z82.69 FAMILY HISTORY OF GOUT: Status: ACTIVE | Noted: 2023-09-04

## 2023-09-04 PROBLEM — E78.5 HYPERLIPIDEMIA: Status: ACTIVE | Noted: 2023-09-04

## 2023-09-04 PROBLEM — I10 HYPERTENSION: Status: ACTIVE | Noted: 2023-09-04

## 2023-09-04 PROBLEM — M06.9 RHEUMATOID ARTHRITIS (HCC): Status: ACTIVE | Noted: 2023-09-04

## 2023-09-04 PROBLEM — I63.9 STROKE (HCC): Status: ACTIVE | Noted: 2023-09-04

## 2023-09-04 PROBLEM — M32.9 LUPUS (HCC): Status: ACTIVE | Noted: 2023-09-04

## 2023-09-04 PROBLEM — Z86.79 HISTORY OF RENAL ARTERY STENOSIS: Status: ACTIVE | Noted: 2023-09-04

## 2023-09-04 LAB
2HR DELTA HS TROPONIN: 7 NG/L
4HR DELTA HS TROPONIN: 11 NG/L
ABO GROUP BLD: NORMAL
ABO GROUP BLD: NORMAL
ALBUMIN SERPL BCP-MCNC: 3.5 G/DL (ref 3.5–5)
ALP SERPL-CCNC: 52 U/L (ref 34–104)
ALT SERPL W P-5'-P-CCNC: 11 U/L (ref 7–52)
ANION GAP SERPL CALCULATED.3IONS-SCNC: 13 MMOL/L
APTT PPP: 24 SECONDS (ref 23–37)
AST SERPL W P-5'-P-CCNC: 18 U/L (ref 13–39)
BASE EXCESS BLDA CALC-SCNC: 5 MMOL/L (ref -2–3)
BASOPHILS # BLD AUTO: 0.03 THOUSANDS/ÂΜL (ref 0–0.1)
BASOPHILS NFR BLD AUTO: 0 % (ref 0–1)
BILIRUB SERPL-MCNC: 0.5 MG/DL (ref 0.2–1)
BLD GP AB SCN SERPL QL: NEGATIVE
BUN SERPL-MCNC: 74 MG/DL (ref 5–25)
CA-I BLD-SCNC: 0.96 MMOL/L (ref 1.12–1.32)
CALCIUM SERPL-MCNC: 8.6 MG/DL (ref 8.4–10.2)
CARDIAC TROPONIN I PNL SERPL HS: 58 NG/L
CARDIAC TROPONIN I PNL SERPL HS: 65 NG/L
CARDIAC TROPONIN I PNL SERPL HS: 69 NG/L
CHLORIDE SERPL-SCNC: 99 MMOL/L (ref 96–108)
CO2 SERPL-SCNC: 23 MMOL/L (ref 21–32)
CREAT SERPL-MCNC: 1.64 MG/DL (ref 0.6–1.3)
EOSINOPHIL # BLD AUTO: 0.04 THOUSAND/ÂΜL (ref 0–0.61)
EOSINOPHIL NFR BLD AUTO: 0 % (ref 0–6)
ERYTHROCYTE [DISTWIDTH] IN BLOOD BY AUTOMATED COUNT: 15.9 % (ref 11.6–15.1)
GFR SERPL CREATININE-BSD FRML MDRD: 27 ML/MIN/1.73SQ M
GLUCOSE SERPL-MCNC: 208 MG/DL (ref 65–140)
GLUCOSE SERPL-MCNC: 220 MG/DL (ref 65–140)
HCO3 BLDA-SCNC: 26.9 MMOL/L (ref 24–30)
HCT VFR BLD AUTO: 35.5 % (ref 34.8–46.1)
HCT VFR BLD CALC: 35 % (ref 34.8–46.1)
HGB BLD-MCNC: 10.9 G/DL (ref 11.5–15.4)
HGB BLDA-MCNC: 11.9 G/DL (ref 11.5–15.4)
IMM GRANULOCYTES # BLD AUTO: 0.06 THOUSAND/UL (ref 0–0.2)
IMM GRANULOCYTES NFR BLD AUTO: 1 % (ref 0–2)
INR PPP: 0.97 (ref 0.84–1.19)
LACTATE SERPL-SCNC: 1.7 MMOL/L (ref 0.5–2)
LACTATE SERPL-SCNC: 3.5 MMOL/L (ref 0.5–2)
LYMPHOCYTES # BLD AUTO: 2.22 THOUSANDS/ÂΜL (ref 0.6–4.47)
LYMPHOCYTES NFR BLD AUTO: 24 % (ref 14–44)
MAGNESIUM SERPL-MCNC: 2.3 MG/DL (ref 1.9–2.7)
MCH RBC QN AUTO: 33.5 PG (ref 26.8–34.3)
MCHC RBC AUTO-ENTMCNC: 30.7 G/DL (ref 31.4–37.4)
MCV RBC AUTO: 109 FL (ref 82–98)
MONOCYTES # BLD AUTO: 0.39 THOUSAND/ÂΜL (ref 0.17–1.22)
MONOCYTES NFR BLD AUTO: 4 % (ref 4–12)
NEUTROPHILS # BLD AUTO: 6.52 THOUSANDS/ÂΜL (ref 1.85–7.62)
NEUTS SEG NFR BLD AUTO: 71 % (ref 43–75)
NRBC BLD AUTO-RTO: 0 /100 WBCS
PCO2 BLD: 28 MMOL/L (ref 21–32)
PCO2 BLD: 31.8 MM HG (ref 42–50)
PH BLD: 7.54 [PH] (ref 7.3–7.4)
PHOSPHATE SERPL-MCNC: 3.7 MG/DL (ref 2.3–4.1)
PLATELET # BLD AUTO: 122 THOUSANDS/UL (ref 149–390)
PMV BLD AUTO: 10.8 FL (ref 8.9–12.7)
PO2 BLD: 49 MM HG (ref 35–45)
POTASSIUM BLD-SCNC: 3.9 MMOL/L (ref 3.5–5.3)
POTASSIUM SERPL-SCNC: 3.8 MMOL/L (ref 3.5–5.3)
PROCALCITONIN SERPL-MCNC: 0.33 NG/ML
PROT SERPL-MCNC: 6.5 G/DL (ref 6.4–8.4)
PROTHROMBIN TIME: 13.1 SECONDS (ref 11.6–14.5)
RBC # BLD AUTO: 3.25 MILLION/UL (ref 3.81–5.12)
RH BLD: NEGATIVE
RH BLD: NEGATIVE
SAO2 % BLD FROM PO2: 89 % (ref 60–85)
SODIUM BLD-SCNC: 136 MMOL/L (ref 136–145)
SODIUM SERPL-SCNC: 135 MMOL/L (ref 135–147)
SPECIMEN EXPIRATION DATE: NORMAL
SPECIMEN SOURCE: ABNORMAL
WBC # BLD AUTO: 9.26 THOUSAND/UL (ref 4.31–10.16)

## 2023-09-04 PROCEDURE — 72125 CT NECK SPINE W/O DYE: CPT

## 2023-09-04 PROCEDURE — 82803 BLOOD GASES ANY COMBINATION: CPT

## 2023-09-04 PROCEDURE — 87040 BLOOD CULTURE FOR BACTERIA: CPT

## 2023-09-04 PROCEDURE — 71260 CT THORAX DX C+: CPT

## 2023-09-04 PROCEDURE — NC001 PR NO CHARGE: Performed by: SURGERY

## 2023-09-04 PROCEDURE — 82330 ASSAY OF CALCIUM: CPT

## 2023-09-04 PROCEDURE — 86900 BLOOD TYPING SEROLOGIC ABO: CPT | Performed by: SURGERY

## 2023-09-04 PROCEDURE — 85014 HEMATOCRIT: CPT

## 2023-09-04 PROCEDURE — 99285 EMERGENCY DEPT VISIT HI MDM: CPT

## 2023-09-04 PROCEDURE — 80053 COMPREHEN METABOLIC PANEL: CPT | Performed by: SURGERY

## 2023-09-04 PROCEDURE — 85025 COMPLETE CBC W/AUTO DIFF WBC: CPT | Performed by: SURGERY

## 2023-09-04 PROCEDURE — 83735 ASSAY OF MAGNESIUM: CPT | Performed by: SURGERY

## 2023-09-04 PROCEDURE — 70450 CT HEAD/BRAIN W/O DYE: CPT

## 2023-09-04 PROCEDURE — 85610 PROTHROMBIN TIME: CPT | Performed by: SURGERY

## 2023-09-04 PROCEDURE — 84132 ASSAY OF SERUM POTASSIUM: CPT

## 2023-09-04 PROCEDURE — 82947 ASSAY GLUCOSE BLOOD QUANT: CPT

## 2023-09-04 PROCEDURE — 71045 X-RAY EXAM CHEST 1 VIEW: CPT

## 2023-09-04 PROCEDURE — 84484 ASSAY OF TROPONIN QUANT: CPT

## 2023-09-04 PROCEDURE — 84145 PROCALCITONIN (PCT): CPT

## 2023-09-04 PROCEDURE — 74177 CT ABD & PELVIS W/CONTRAST: CPT

## 2023-09-04 PROCEDURE — 86850 RBC ANTIBODY SCREEN: CPT | Performed by: SURGERY

## 2023-09-04 PROCEDURE — 99223 1ST HOSP IP/OBS HIGH 75: CPT | Performed by: INTERNAL MEDICINE

## 2023-09-04 PROCEDURE — EDAIR PR ED AIR: Performed by: EMERGENCY MEDICINE

## 2023-09-04 PROCEDURE — 84100 ASSAY OF PHOSPHORUS: CPT | Performed by: SURGERY

## 2023-09-04 PROCEDURE — 85730 THROMBOPLASTIN TIME PARTIAL: CPT | Performed by: SURGERY

## 2023-09-04 PROCEDURE — 83605 ASSAY OF LACTIC ACID: CPT | Performed by: SURGERY

## 2023-09-04 PROCEDURE — 73030 X-RAY EXAM OF SHOULDER: CPT

## 2023-09-04 PROCEDURE — 86901 BLOOD TYPING SEROLOGIC RH(D): CPT | Performed by: SURGERY

## 2023-09-04 PROCEDURE — 36415 COLL VENOUS BLD VENIPUNCTURE: CPT | Performed by: SURGERY

## 2023-09-04 PROCEDURE — 84295 ASSAY OF SERUM SODIUM: CPT

## 2023-09-04 RX ORDER — METHOCARBAMOL 500 MG/1
500 TABLET, FILM COATED ORAL 4 TIMES DAILY
COMMUNITY
End: 2023-09-08

## 2023-09-04 RX ORDER — ALBUTEROL SULFATE 90 UG/1
2 AEROSOL, METERED RESPIRATORY (INHALATION) EVERY 6 HOURS PRN
COMMUNITY

## 2023-09-04 RX ORDER — ALBUTEROL SULFATE 90 UG/1
2 AEROSOL, METERED RESPIRATORY (INHALATION) EVERY 6 HOURS PRN
Status: DISCONTINUED | OUTPATIENT
Start: 2023-09-04 | End: 2023-09-08 | Stop reason: HOSPADM

## 2023-09-04 RX ORDER — CLOPIDOGREL BISULFATE 75 MG/1
75 TABLET ORAL DAILY
Status: DISCONTINUED | OUTPATIENT
Start: 2023-09-05 | End: 2023-09-08 | Stop reason: HOSPADM

## 2023-09-04 RX ORDER — ENOXAPARIN SODIUM 100 MG/ML
30 INJECTION SUBCUTANEOUS DAILY
Status: DISCONTINUED | OUTPATIENT
Start: 2023-09-05 | End: 2023-09-04

## 2023-09-04 RX ORDER — GABAPENTIN 300 MG/1
300 CAPSULE ORAL 2 TIMES DAILY
Status: ON HOLD | COMMUNITY
End: 2023-09-08 | Stop reason: SDUPTHER

## 2023-09-04 RX ORDER — LEVOTHYROXINE SODIUM 112 UG/1
112 TABLET ORAL DAILY
Status: ON HOLD | COMMUNITY
End: 2023-09-08 | Stop reason: SDUPTHER

## 2023-09-04 RX ORDER — SERTRALINE HYDROCHLORIDE 25 MG/1
25 TABLET, FILM COATED ORAL DAILY
Status: DISCONTINUED | OUTPATIENT
Start: 2023-09-05 | End: 2023-09-08 | Stop reason: HOSPADM

## 2023-09-04 RX ORDER — ONDANSETRON 2 MG/ML
4 INJECTION INTRAMUSCULAR; INTRAVENOUS EVERY 6 HOURS PRN
Status: DISCONTINUED | OUTPATIENT
Start: 2023-09-04 | End: 2023-09-08 | Stop reason: HOSPADM

## 2023-09-04 RX ORDER — ALLOPURINOL 300 MG/1
300 TABLET ORAL DAILY
Status: ON HOLD | COMMUNITY
End: 2023-09-08 | Stop reason: SDUPTHER

## 2023-09-04 RX ORDER — GABAPENTIN 300 MG/1
300 CAPSULE ORAL 2 TIMES DAILY
Status: DISCONTINUED | OUTPATIENT
Start: 2023-09-04 | End: 2023-09-06

## 2023-09-04 RX ORDER — ALLOPURINOL 300 MG/1
150 TABLET ORAL DAILY
Status: DISCONTINUED | OUTPATIENT
Start: 2023-09-05 | End: 2023-09-08 | Stop reason: HOSPADM

## 2023-09-04 RX ORDER — SERTRALINE HYDROCHLORIDE 25 MG/1
25 TABLET, FILM COATED ORAL DAILY
COMMUNITY

## 2023-09-04 RX ORDER — HEPARIN SODIUM 5000 [USP'U]/ML
5000 INJECTION, SOLUTION INTRAVENOUS; SUBCUTANEOUS EVERY 8 HOURS SCHEDULED
Status: DISCONTINUED | OUTPATIENT
Start: 2023-09-04 | End: 2023-09-08 | Stop reason: HOSPADM

## 2023-09-04 RX ORDER — ACETAMINOPHEN 325 MG/1
975 TABLET ORAL EVERY 6 HOURS PRN
Status: DISCONTINUED | OUTPATIENT
Start: 2023-09-04 | End: 2023-09-06

## 2023-09-04 RX ORDER — ENOXAPARIN SODIUM 100 MG/ML
40 INJECTION SUBCUTANEOUS DAILY
Status: DISCONTINUED | OUTPATIENT
Start: 2023-09-05 | End: 2023-09-04

## 2023-09-04 RX ORDER — PREDNISONE 5 MG/1
TABLET ORAL DAILY
COMMUNITY

## 2023-09-04 RX ORDER — TORSEMIDE 20 MG/1
20 TABLET ORAL 2 TIMES DAILY
COMMUNITY

## 2023-09-04 RX ORDER — TORSEMIDE 20 MG/1
20 TABLET ORAL 2 TIMES DAILY
Status: DISCONTINUED | OUTPATIENT
Start: 2023-09-04 | End: 2023-09-08 | Stop reason: HOSPADM

## 2023-09-04 RX ORDER — LEVOTHYROXINE SODIUM 112 UG/1
112 TABLET ORAL DAILY
Status: DISCONTINUED | OUTPATIENT
Start: 2023-09-05 | End: 2023-09-05

## 2023-09-04 RX ORDER — ISOSORBIDE MONONITRATE 30 MG/1
30 TABLET, EXTENDED RELEASE ORAL DAILY
Status: DISCONTINUED | OUTPATIENT
Start: 2023-09-05 | End: 2023-09-08 | Stop reason: HOSPADM

## 2023-09-04 RX ORDER — CLOPIDOGREL BISULFATE 75 MG/1
75 TABLET ORAL DAILY
COMMUNITY

## 2023-09-04 RX ORDER — PREDNISONE 5 MG/1
5 TABLET ORAL DAILY
Status: DISCONTINUED | OUTPATIENT
Start: 2023-09-05 | End: 2023-09-06

## 2023-09-04 RX ORDER — ISOSORBIDE MONONITRATE 30 MG/1
30 TABLET, EXTENDED RELEASE ORAL DAILY
COMMUNITY

## 2023-09-04 RX ADMIN — SODIUM CHLORIDE 250 ML: 0.9 INJECTION, SOLUTION INTRAVENOUS at 15:45

## 2023-09-04 RX ADMIN — METOPROLOL TARTRATE 25 MG: 25 TABLET, FILM COATED ORAL at 20:21

## 2023-09-04 RX ADMIN — TORSEMIDE 20 MG: 20 TABLET ORAL at 20:21

## 2023-09-04 RX ADMIN — ACETAMINOPHEN 975 MG: 325 TABLET, FILM COATED ORAL at 20:55

## 2023-09-04 RX ADMIN — IOHEXOL 100 ML: 350 INJECTION, SOLUTION INTRAVENOUS at 13:25

## 2023-09-04 RX ADMIN — CEFTRIAXONE SODIUM 2000 MG: 10 INJECTION, POWDER, FOR SOLUTION INTRAVENOUS at 16:04

## 2023-09-04 RX ADMIN — GABAPENTIN 300 MG: 300 CAPSULE ORAL at 20:21

## 2023-09-04 RX ADMIN — HEPARIN SODIUM 5000 UNITS: 5000 INJECTION INTRAVENOUS; SUBCUTANEOUS at 20:21

## 2023-09-04 NOTE — ASSESSMENT & PLAN NOTE
Patient reported recurrent episodes of near syncope. She has had multiple falls previously as per the granddaughter. Differential would include polypharmacy versus arrhythmia versus orthostasis versus valvular disease versus deconditioning  · orthostatic blood pressure checks  · Telemetry monitoring  · Consult cardiology regarding cardiac meds. · PT/OT  · Echocardiogram regarding aortic valve. Patient with history of bioprosthetic valve. · Reduced dosing of Neurontin to twice daily from 3 times daily. Hold Robaxin. Continue with supportive care. Presenting with recurrent episodes.

## 2023-09-04 NOTE — ED NOTES
Report called to floor. Spoke with charge nurse care plan discussed.      Venice York RN  09/04/23 [id number]

## 2023-09-04 NOTE — H&P
[address] [facility]  H&P  Name: Marie Hernandez 80 y.o. female I MRN: [id number]  Unit/Bed#: ED 20 I Date of Admission: 9/4/2023   Date of Service: 9/4/2023 I Hospital Day: 0      Assessment/Plan   * Febrile illness  Assessment & Plan  Continue to follow-up on cultures. Granddaughter reports mild confusion over the last several days. Patient does acknowledge symptoms of urinary frequency/burning with mild suprapubic discomfort. However granddaughter reports that she has a history of urinary tract infection and urinary incontinence. Patient presents with low-grade temperature. Continue with empiric antibiotic therapy pending final culture results. Monitor temperature curve  Follow-up on blood cultures. Denies gout pain currently. Near syncope  Assessment & Plan  Patient reported recurrent episodes of near syncope. She has had multiple falls previously as per the granddaughter. Differential would include polypharmacy versus arrhythmia versus orthostasis versus valvular disease versus deconditioning. Her confusion may be related to the above. We will need to work-up above  · orthostatic blood pressure checks  · Telemetry monitoring  · Consult cardiology regarding cardiac meds. · PT/OT  · Echocardiogram regarding aortic valve. Patient with history of bioprosthetic valve. · Reduced dosing of Neurontin to twice daily from 3 times daily. Hold Robaxin. Continue with supportive care. Presenting with recurrent episodes. Stroke [facility])  Assessment & Plan  Outpatient follow-up    Hypertension  Assessment & Plan  Continue with torsemide and metoprolol therapy. Patient also on Imdur. Peripheral polyneuropathy  Assessment & Plan  Neurontin 300 3 times daily at home.     Family history of gout  Assessment & Plan  Currently on allopurinol 150 daily    History of renal artery stenosis  Assessment & Plan  Prior history of renal stent    Diabetes [facility])  Assessment & Plan  No results found for: "HGBA1C"    No results for input(s): "POCGLU" in the last 72 hours. Blood Sugar Average: Last 72 hrs:    Sliding scale insulin    Rheumatoid arthritis (HCC)  Assessment & Plan  Prednisone 5 mg daily       VTE Prophylaxis: Enoxaparin (Lovenox)  / sequential compression device   Code Status: Level 3 DNR  POLST: There is no POLST form on file for this patient (pre-hospital)  Discussion with family: Granddaughter at bedside    Anticipated Length of Stay:  Patient will be admitted on an Inpatient basis with an anticipated length of stay of greater than 2 midnights. Justification for Hospital Stay: Need further work-up for fever and also recurrent episodes of near syncope    Total Time for Visit, including Counseling / Coordination of Care: 85.  Greater than 50% of this total time spent on direct patient counseling and coordination of care. Chief Complaint:   Febrile illness    History of Present Illness:    Bridgette Clark is a 80 y.o. female who presents with a past medical history of hemorrhoids, hypothyroidism, type 2 diabetes, obstructive sleep apnea, hypertension, CKD stage IV, renal artery stenosis, CHF hyperlipidemia and hyperlipidemia who presents initially as a trauma alert for fall. Patient is on chronic Plavix and presents for evaluation of fall. She typically walks with a walker. Patient reportedly had a 1 to 2-minute loss of consciousness with minimally responsive episode as well. She was noted to be amnestic to the event. Family reports similar episodes in the past.  Granddaughter reports that over the last 2 to 3 days she has been increasing confused with lethargy presentation. However, she also notes that she has had a history of recurrent episodes of falls. Review of Systems:    Review of Systems   Constitutional: Negative for chills, diaphoresis and fatigue. Respiratory: Negative for chest tightness and shortness of breath.     Cardiovascular: Negative for chest pain and leg swelling. Endocrine: Negative for cold intolerance. Genitourinary: Negative for difficulty urinating. Musculoskeletal: Negative for arthralgias and back pain. Neurological: Negative for dizziness, light-headedness and headaches. Hematological: Negative for adenopathy. All other systems reviewed and are negative. Past Medical and Surgical History:      Morbid obesity (HCC)   Essential hypertension   Chronic kidney disease   History of aortic valve replacement   RA (rheumatoid arthritis) ([facility])   Fibromyalgia   Diastolic CHF, chronic ([facility])   Neuropathy   Lipid disorder   History of stroke   History of malignant neoplasm of breast   Hypoactive thyroid   Osteoporosis   Chronic pulmonary heart disease (HCC)   Post herpetic neuralgia   Peripheral polyneuropathy   History of renal stent   Controlled type 2 diabetes mellitus with diabetic neuropathy, without long-term current use of insulin ([facility])   SLE (systemic lupus erythematosus) ([address])   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   Sleep apnea   Stroke of uncertain pathology ([address])   Psoriasis   Anxiety   H/O spinal fusion   Heart failure, unspecified HF chronicity, unspecified heart failure type ([facility])   Seasonal allergic rhinitis due to pollen   Nephrosclerosis arteriolar, 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 or unspecified chronic kidney disease   Gout   Recurrent UTI   Open-angle glaucoma of both eyes     Aortic valve replaced   Breast cancer ([address])   Detection and Surgery 2014   Cataract   Surgery   CHF (congestive heart failure) ([address]) 8/18/2019   Chronic kidney disease   Chronic pain disorder   arthritis, muscular, nerve pain   Coronary artery disease   Diabetes mellitus ([address])   Diabetes mellitus type 2, controlled ([address])   Edema   lower legs   Fibromyalgia   Fibromyalgia, primary   Hypertension   Irritable bowel syndrome   Low back pain   Morbid obesity ([address])   Neck fracture ([address]) 01/09/2019   Rheumatoid arthritis(714.0)   Skin cancer 8/2020   Stroke [facility])   Urinary tract infection   ocassional   Visual impairment 1/2019       Past Surgical History:   Procedure Laterality Date   AMPUTATION   BREAST SURGERY 2014   Cancer detection and surgery   CARDIAC VALVE REPLACEMENT   CATARACT EXTRACTION   COSMETIC SURGERY 9/24/2020   HYSTERECTOMY   JOINT REPLACEMENT   both knees   RENAL ARTERY STENT 2015   SPINE SURGERY           Meds/Allergies:       acetaminophen (TYLENOL) 500 MG tablet Take 2 tablets (1,000 mg total) by mouth every 8 (eight) hours as needed for mild pain (pain score 1-3) or headaches. albuterol (PROVENTIL HFA;VENTOLIN HFA;PROAIR HFA) 90 mcg/actuation inhaler USE 2 INHALATIONS EVERY 4 HOURS AS NEEDED FOR WHEEZE OR SHORTNESS OF BREATH   albuterol (PROVENTIL) 2.5 mg /3 mL (0.083 %) nebulizer solution Take 3 mL (2.5 mg total) by nebulization 3 (three) times a day as needed for wheezing. allopurinoL (ZYLOPRIM) 100 MG tablet Take 3 tablets (150 mg total) by mouth daily. ascorbic acid, vitamin C, (vitamin C) 100 MG tablet Take 1 tablet (100 mg total) by mouth daily. azelastine 0.15 % (205.5 mcg) spry INHALE 1 SPRAY INTO EACH NOSTRIL 2 (TWO) TIMES A DAY AS NEEDED (CONGESTION). bimatoprost (LUMIGAN) 0.03 % ophthalmic drops 1 drop nightly Indications: wide-angle glaucoma. clopidogrel (PLAVIX) 75 mg tablet TAKE 1 TABLET DAILY (NEED APPOINTMENT WITH CARDIOLOGY FOR FUTURE REFILLS)   docusate sodium (COLACE) 100 MG capsule Take 1 capsule (100 mg total) by mouth 2 (two) times a day as needed. gabapentin (NEURONTIN) 300 MG capsule Take 1 capsule (300 mg total) by mouth 2 (two) times a day. (Patient taking differently: Take 1 capsule (300 mg total) by mouth 3 (three) times a day.)   HYDROcodone-acetaminophen (NORCO) 5-325 mg per tablet Take 1 tablet by mouth 3 (three) times a day as needed for pain. isosorbide mononitrate (IMDUR) 30 MG 24 hr tablet Take 1 tablet (30 mg total) by mouth daily.    levothyroxine (SYNTHROID, LEVOTHROID) 112 MCG tablet TAKE 1 TABLET DAILY   lidocaine (LIDODERM) 5 % PLACE 2 PATCHES TO THE SKIN DAILY. REMOVE AND DISCARD PATCHES WITHIN 12 HOURS   methenamine (HIPREX) 1 gram tablet Take 1 tablet (1 g total) by mouth 2 (two) times a day with meals. Taking half tab BID   methocarbamoL (ROBAXIN) 500 MG tablet TAKE 1 TABLET FOUR TIMES A DAY AS NEEDED FOR MUSCLE SPASMS   metoprolol (LOPRESSOR) 25 MG tablet TAKE 1 TABLET TWICE A DAY   multivit-min-iron-FA-lutein 8 mg iron-400 mcg-300 mcg tab Take 1 tablet by mouth daily. nitroglycerin (NITROSTAT) 0.4 MG SL tablet Place 1 tablet (0.4 mg total) under the tongue every 5 (five) minutes as needed for chest pain. nystatin (MYCOSTATIN) powder Apply topically 2 (two) times a day. Apply to affected area(s). OXYGEN USE, HOME HEALTH OR HOSPICE, Inhale 2 L/min nightly. nightly and PRN   predniSONE (DELTASONE) 5 MG tablet Take 1 tablet (5 mg total) by mouth daily. red yeast rice 600 mg cap Take 1 capsule by mouth daily. sertraline (ZOLOFT) 25 MG tablet TAKE 1 TABLET DAILY   torsemide (DEMADEX) 20 MG tablet 2 tablets (40 mg total). 20mg AM and 20mg PM   senna (SENOKOT) 8.6 mg tablet Take 1 tablet (8.6 mg total) by mouth. No current facility-administered medications for this visit. I have reviewed home medications with patient personally.     Allergies: Not on File  Duloxetine Hcl Hemorrhagic stroke and Other (See Comments)   Triprolidine-Pse-Acetaminophen Unknown Reaction   Triprolidine-Pseudoephedrine Other (See Comments)   Anastrozole   Antihistimine Unknown Reaction   Cymbalta [Duloxetine] Hemorrhagic stroke   Exemestane Muscle pain/cramps   Lexapro [Escitalopram]   Other reaction(s): Urinary Retention  Other reaction(s): Urinary Retention   Lyrica [Pregabalin]   Other reaction(s): Hypertension  Other reaction(s): Hypertension   Metformin Diarrhea   Oxycodone   Statins-Hmg-Coa Reductase Inhibitors Muscle pain/cramps   Tramadol   Other reaction(s): Hypertension  Other reaction(s): Hypertension   Social History:     Marital Status:    Occupation: Retired  Patient Pre-hospital Living Situation: Home  Patient Pre-hospital Level of Mobility: Able to  Patient Pre-hospital Diet Restrictions: Denies  Substance Use History:   Social History     Substance and Sexual Activity   Alcohol Use Not Currently     Social History     Tobacco Use   Smoking Status Never   Smokeless Tobacco Never     Social History     Substance and Sexual Activity   Drug Use Not Currently       Family History:    History reviewed. No pertinent family history. Physical Exam:     Vitals:   Blood Pressure: 133/60 (09/04/23 1600)  Pulse: 65 (09/04/23 1600)  Temperature: 100.4 °F (38 °C) (09/04/23 1255)  Temp Source: Tympanic (09/04/23 1255)  Respirations: 18 (09/04/23 1600)  Height: 4' 11" (149.9 cm) (09/04/23 1711)  Weight - Scale: 73 kg (160 lb 15 oz) (09/04/23 1255)  SpO2: 98 % (09/04/23 1600)    Physical Exam  Constitutional:       Appearance: Normal appearance. Cardiovascular:      Rate and Rhythm: Normal rate and regular rhythm. Heart sounds: No murmur heard. Pulmonary:      Effort: Pulmonary effort is normal.      Breath sounds: Normal breath sounds. Abdominal:      General: Abdomen is flat. Palpations: Abdomen is soft. Musculoskeletal:         General: Swelling present. Cervical back: Normal range of motion and neck supple. Skin:     General: Skin is warm and dry. Neurological:      General: No focal deficit present. Mental Status: She is alert and oriented to person, place, and time. Psychiatric:         Mood and Affect: Mood normal.         Additional Data:     Lab Results: I have personally reviewed pertinent reports.       Results from last 7 days   Lab Units 09/04/23  1311   WBC Thousand/uL 9.26   HEMOGLOBIN g/dL 10.9*   HEMATOCRIT % 35.5   PLATELETS Thousands/uL 122*   NEUTROS PCT % 71   LYMPHS PCT % 24   MONOS PCT % 4   EOS PCT % 0 Results from last 7 days   Lab Units 09/04/23  1311   SODIUM mmol/L 135   POTASSIUM mmol/L 3.8   CHLORIDE mmol/L 99   CO2 mmol/L 23   BUN mg/dL 74*   CREATININE mg/dL 1.64*   ANION GAP mmol/L 13   CALCIUM mg/dL 8.6   ALBUMIN g/dL 3.5   TOTAL BILIRUBIN mg/dL 0.50   ALK PHOS U/L 52   ALT U/L 11   AST U/L 18   GLUCOSE RANDOM mg/dL 208*     Results from last 7 days   Lab Units 09/04/23  1311   INR  0.97             Results from last 7 days   Lab Units 09/04/23  1548 09/04/23  1311   LACTIC ACID mmol/L 1.7 3.5*   PROCALCITONIN ng/ml  --  0.33*       Imaging: I have personally reviewed pertinent reports. TRAUMA - CT head wo contrast   Final Result by Romayne Ligas, MD (09/04 1437)      No acute intracranial abnormality. Stable chronic microvascular ischemic changes and lacunar infarcts as described above. I personally discussed this study with Mark Carr on 9/4/2023 2:35 PM.                     Workstation performed: [id number]         TRAUMA - CT spine cervical wo contrast   Final Result by Romayne Ligas, MD (09/04 1437)      No new cervical spine fracture or traumatic malalignment. Stable chronic mildly displaced fracture of the left lateral mass of C2 vertebral body, compared to 5/19/2022. Posterior fusion at C1-C2 level with moderate degenerative changes and mild to    moderate spinal canal stenosis and likely neuroforaminal narrowing at C2-C3 level      Small biapical pulmonary nodules measuring up to 2 mm. Based on current Fleischner Society 2017 Guidelines on incidental pulmonary nodule, no routine follow-up is needed if the patient is low risk. If the patient is high risk, optional follow-up chest    CT at 12 months can be considered.       I personally discussed this study with Mark Carr on 9/4/2023 2:35 PM.                     Workstation performed: [id number]         CT chest abdomen pelvis w contrast   Final Result by Romayne Ligas, MD (09/04 1436)      No acute traumatic injury of the chest, abdomen and pelvis. Apparent hyperenhancing 1.1 cm pancreatic body lesion, with soft tissue prominence unchanged compared to 10/8/2012 with new true or pseudo hyperenhancement. Findings can represent either an artifact or presence of a pancreatic neoplasm with low malignant    potential such as neuroendocrine tumor. MRI abdomen with and without contrast is recommended for further assessment. Small bilateral pulmonary nodules. based on current Fleischner Society 2017 Guidelines on incidental pulmonary nodule, no routine follow-up is needed if the patient is low risk. If the patient is high risk, optional follow-up chest CT at 12 months can    be considered. 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 I personally discussed this study with Sky Brady on 9/4/2023 2:35 PM.            Workstation performed: [id number]         XR Trauma multiple (SLB/RA trauma bay ONLY)   Final Result by Michelle Laswon MD (09/04 1429)      No acute cardiopulmonary disease within limitations of supine imaging. Workstation performed: [id number]         XR chest 1 view    (Results Pending)   XR shoulder 2+ vw left    (Results Pending)         ** Please Note: This note has been constructed using a voice recognition system.  **

## 2023-09-04 NOTE — ASSESSMENT & PLAN NOTE
Continue to follow-up on cultures. Patient does acknowledge symptoms of urinary frequency/burning with mild suprapubic discomfort. However granddaughter reports that she has a history of urinary tract infection and urinary incontinence. Patient presents with low-grade temperature. Continue with empiric antibiotic therapy pending final culture results. Monitor temperature curve  Follow-up on blood cultures. Denies gout pain currently.

## 2023-09-04 NOTE — H&P
H&P - Trauma   Saray Morales 80 y.o. female MRN: [id number]  Unit/Bed#: KH7 873-02 Encounter: [id number]    Trauma Alert: Level B   Model of Arrival: Ambulance    Trauma Team: Attending Kristie Rosenberg and Residents Edilson  Consultants:     None     Assessment/Plan   Active Problems / Assessment:   Fall on Plavix + LOC       Plan:   CXR  CTH  CT C-spine  CT Chest/ABD/Pelvis  Medicine admit    History of Present Illness     Chief Complaint: Left arm pain  Mechanism:Fall     HPI:    Saray Morales is a 80 y.o. female on plavix, who presents after having a fall. Walks with walker, semi witnessed by grand daughter. Patient was walking with walker, saw her fall but did not see how. Family states there was a 1-2 min period of LOC/minimally responsive episode, patient is amnestic to the event. Patient's daughter states that she has had a couple episodes like this in the past where she becomes minimally responsive and may have syncopized. Review of Systems   Constitutional: Negative. HENT: Negative. Eyes: Negative. Respiratory: Negative. Cardiovascular: Negative. Gastrointestinal: Negative. Endocrine: Negative. Genitourinary: Negative. Musculoskeletal: Positive for arthralgias, back pain and myalgias. Allergic/Immunologic: Negative. Neurological: Negative. Hematological: Negative. Psychiatric/Behavioral: Negative. 12-point, complete review of systems was reviewed and negative except as stated above. Historical Information     History reviewed. No pertinent past medical history. History reviewed. No pertinent surgical history. Social History     Tobacco Use   • Smoking status: Never   • Smokeless tobacco: Never   Substance Use Topics   • Alcohol use: Not Currently   • Drug use: Not Currently       There is no immunization history on file for this patient.   Last Tetanus: unk  Family History: Non-contributory     Meds/Allergies   all current active meds have been reviewed  Allergies have not been reviewed; Not on File    Objective   Initial Vitals:   Temperature: 100.4 °F (38 °C) (09/04/23 1255)  Pulse: 78 (09/04/23 1255)  Respirations: 17 (09/04/23 1255)  Blood Pressure: 149/68 (09/04/23 1255)    Primary Survey:   Airway:        Status: patent;        Pre-hospital Interventions: none        Hospital Interventions: none  Breathing:        Pre-hospital Interventions: none       Effort: normal       Right breath sounds: normal       Left breath sounds: normal  Circulation:        Rhythm: regular       Rate: regular   Right Pulses Left Pulses    R radial: 2+    R pedal: 0Audible by Doppler: due to pitting edema not palpable. L radial: 2+    L pedal: 0Audible by Doppler: due to pitting edema not palpable. Disability:        GCS: Eye: 4; Verbal: 5 Motor: 6 Total: 15       Right Pupil: round;  reactive         Left Pupil:  round;  reactive      R Motor Strength L Motor Strength    R : 5/5  R dorsiflex: 5/5  R plantarflex: 5/5 L : 5/5  L dorsiflex: 5/5  L plantarflex: 5/5        Sensory:  No sensory deficit  Exposure:       Completed: Yes      Secondary Survey:  Physical Exam  Constitutional:       General: She is not in acute distress. Appearance: She is not toxic-appearing. HENT:      Head: Atraumatic. Eyes:      Pupils: Pupils are equal, round, and reactive to light. Comments: 3 + Pupils   Cardiovascular:      Rate and Rhythm: Normal rate and regular rhythm. Heart sounds: Normal heart sounds. Pulmonary:      Effort: Pulmonary effort is normal. No respiratory distress. Breath sounds: Normal breath sounds. No wheezing or rales. Abdominal:      General: Bowel sounds are normal. There is no distension. Palpations: Abdomen is soft. Tenderness: There is no abdominal tenderness. There is no guarding. Musculoskeletal:         General: Normal range of motion. Skin:     General: Skin is warm.       Capillary Refill: Capillary refill takes less than 2 seconds. Findings: Bruising present. Comments: + Ecchymosis posterior right thigh near perineum  + Ecchymosis anterior left shoulder   Neurological:      General: No focal deficit present. Mental Status: She is alert and oriented to person, place, and time. Psychiatric:         Behavior: Behavior normal.         Invasive Devices     Peripheral Intravenous Line  Duration           Peripheral IV 09/04/23 Right Antecubital <1 day    Peripheral IV 09/04/23 Right;Ventral (anterior) Hand <1 day              Lab Results: I have personally reviewed all pertinent laboratory/test results 09/05/23 and in the preceding 24 hours. Recent Labs     09/04/23  1309 09/04/23  1311 09/04/23  1311 09/04/23  1548 09/04/23  1825   WBC  --  9.26  --   --   --    HGB 11.9 10.9*  --   --   --    HCT 35 35.5  --   --   --    PLT  --  122*  --   --   --    SODIUM  --  135  --   --   --    K  --  3.8  --   --   --    CL  --  99  --   --   --    CO2 28 23  --   --   --    BUN  --  74*  --   --   --    CREATININE  --  1.64*  --   --   --    GLUC  --  208*  --   --   --    CAIONIZED 0.96*  --   --   --   --    MG  --  2.3  --   --   --    PHOS  --  3.7  --   --   --    AST  --  18  --   --   --    ALT  --  11  --   --   --    ALB  --  3.5  --   --   --    TBILI  --  0.50  --   --   --    ALKPHOS  --  52  --   --   --    PTT  --  24  --   --   --    INR  --  0.97  --   --   --    HSTNI0  --   --   --  58*  --    HSTNI2  --   --   --   --  65*   LACTICACID  --  3.5*   < > 1.7  --     < > = values in this interval not displayed. Imaging Results: I have personally reviewed pertinent images saved in PACS. CT scan findings (and other pertinent positive findings on images) were discussed with radiology.  My interpretation of the images/reports are as follows:  Chest Xray(s): negative for acute findings   FAST exam(s): negative for acute findings   CT Scan(s): pending   Additional Xray(s): pending       Code Status: Level 1 - Full Code  Advance Directive and Living Will:      Power of :    POLST:    I have spent 60 minutes with Patient  today in which greater than 50% of this time was spent in counseling/coordination of care regarding Diagnostic results, Impressions, Counseling / Coordination of care, Documenting in the medical record, Reviewing / ordering tests, medicine, procedures   and Obtaining or reviewing history  . Patient's workup returned unremarkable for new traumatic sequela of the fall. Will admit to medicine for concern of infection vs cardiogenic syncope with Trauma consulted.  Will perform

## 2023-09-04 NOTE — ED PROVIDER NOTES
Emergency Department Airway Evaluation and Management Form    History  Obtained from: none  Review of patient's allergies indicates no known allergies. Chief Complaint:  Trauma Alert    HPI: Pt is a 80 y.o. female presents s/p fall, plavix  Hit head  No LOC. I have reviewed and agree with the history as documented. Physical Exam    Vitals:    09/04/23 1300   BP: (!) 179/77   Pulse: 78   Resp: 18   Temp:    SpO2: 94%     Supplemental Oxygen:none    GCS: 15    Neuro: Alert and oriented  Psych: not combative, not anxious, cooperative for exam  Neck: In collar, No JVD, No midline tenderness  Cardio:  Normal  Respiratory: Normal  Mouth:  Normal  Pharynx: Normal    Monitor:  NSR      ED Medications    No current facility-administered medications for this encounter. No current outpatient medications on file.       Intubation    No intubation required    [facility]    ED Provider  Electronically Signed by         Shmuel Charles MD  09/04/23 8017

## 2023-09-04 NOTE — ASSESSMENT & PLAN NOTE
No results found for: "HGBA1C"    No results for input(s): "POCGLU" in the last 72 hours.     Blood Sugar Average: Last 72 hrs:    Sliding scale insulin

## 2023-09-05 ENCOUNTER — APPOINTMENT (INPATIENT)
Dept: NON INVASIVE DIAGNOSTICS | Facility: HOSPITAL | Age: 88
DRG: 690 | End: 2023-09-05
Attending: INTERNAL MEDICINE
Payer: COMMERCIAL

## 2023-09-05 PROBLEM — L89.96 PRESSURE INJURY OF DEEP TISSUE: Chronic | Status: ACTIVE | Noted: 2023-09-05

## 2023-09-05 PROBLEM — N30.00 ACUTE CYSTITIS WITHOUT HEMATURIA: Status: ACTIVE | Noted: 2023-09-04
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
## 2023-09-05 PROBLEM — Z86.73 HISTORY OF STROKE: Status: ACTIVE | Noted: 2023-09-04

## 2023-09-05 PROBLEM — K86.9 PANCREATIC LESION: Status: ACTIVE | Noted: 2023-09-05

## 2023-09-05 LAB
ALBUMIN SERPL BCP-MCNC: 3.3 G/DL (ref 3.5–5)
ALP SERPL-CCNC: 50 U/L (ref 34–104)
ALT SERPL W P-5'-P-CCNC: 12 U/L (ref 7–52)
ANION GAP SERPL CALCULATED.3IONS-SCNC: 8 MMOL/L
AORTIC ROOT: 2.3 CM
AORTIC VALVE MEAN VELOCITY: 20.2 M/S
APICAL FOUR CHAMBER EJECTION FRACTION: 46 %
ASCENDING AORTA: 3.3 CM
AST SERPL W P-5'-P-CCNC: 17 U/L (ref 13–39)
AV LVOT MEAN GRADIENT: 1 MMHG
AV LVOT PEAK GRADIENT: 2 MMHG
AV MEAN GRADIENT: 18 MMHG
AV PEAK GRADIENT: 28 MMHG
AV VALVE AREA: 0.89 CM2
AV VELOCITY RATIO: 0.27
BACTERIA UR QL AUTO: ABNORMAL /HPF
BASOPHILS # BLD AUTO: 0.03 THOUSANDS/ÂΜL (ref 0–0.1)
BASOPHILS NFR BLD AUTO: 1 % (ref 0–1)
BILIRUB SERPL-MCNC: 0.33 MG/DL (ref 0.2–1)
BILIRUB UR QL STRIP: NEGATIVE
BUN SERPL-MCNC: 67 MG/DL (ref 5–25)
CALCIUM ALBUM COR SERPL-MCNC: 8.8 MG/DL (ref 8.3–10.1)
CALCIUM SERPL-MCNC: 8.2 MG/DL (ref 8.4–10.2)
CHLORIDE SERPL-SCNC: 98 MMOL/L (ref 96–108)
CLARITY UR: ABNORMAL
CO2 SERPL-SCNC: 31 MMOL/L (ref 21–32)
COLOR UR: ABNORMAL
CREAT SERPL-MCNC: 1.67 MG/DL (ref 0.6–1.3)
DOP CALC AO PEAK VEL: 2.6 M/S
DOP CALC AO VTI: 78 CM
DOP CALC LVOT AREA: 3.14 CM2
DOP CALC LVOT CARDIAC INDEX: 2.21 L/MIN/M2
DOP CALC LVOT CARDIAC OUTPUT: 3.72 L/MIN
DOP CALC LVOT DIAMETER: 2 CM
DOP CALC LVOT PEAK VEL VTI: 22 CM
DOP CALC LVOT PEAK VEL: 0.7 M/S
DOP CALC LVOT STROKE INDEX: 35.1 ML/M2
DOP CALC LVOT STROKE VOLUME: 69.08 CM3
DOP CALC MV VTI: 53.21 CM
E WAVE DECELERATION TIME: 325 MS
EOSINOPHIL # BLD AUTO: 0.14 THOUSAND/ÂΜL (ref 0–0.61)
EOSINOPHIL NFR BLD AUTO: 2 % (ref 0–6)
ERYTHROCYTE [DISTWIDTH] IN BLOOD BY AUTOMATED COUNT: 16.1 % (ref 11.6–15.1)
FLUAV RNA RESP QL NAA+PROBE: NEGATIVE
FLUBV RNA RESP QL NAA+PROBE: NEGATIVE
FRACTIONAL SHORTENING: 19 % (ref 28–44)
GFR SERPL CREATININE-BSD FRML MDRD: 26 ML/MIN/1.73SQ M
GLUCOSE SERPL-MCNC: 104 MG/DL (ref 65–140)
GLUCOSE UR STRIP-MCNC: NEGATIVE MG/DL
HCT VFR BLD AUTO: 33.4 % (ref 34.8–46.1)
HGB BLD-MCNC: 10.8 G/DL (ref 11.5–15.4)
HGB UR QL STRIP.AUTO: ABNORMAL
IMM GRANULOCYTES # BLD AUTO: 0.05 THOUSAND/UL (ref 0–0.2)
IMM GRANULOCYTES NFR BLD AUTO: 1 % (ref 0–2)
INTERVENTRICULAR SEPTUM IN DIASTOLE (PARASTERNAL SHORT AXIS VIEW): 0.6 CM
INTERVENTRICULAR SEPTUM: 0.6 CM (ref 0.6–1.1)
KETONES UR STRIP-MCNC: NEGATIVE MG/DL
LAAS-AP2: 21.9 CM2
LAAS-AP4: 20.8 CM2
LEFT ATRIUM SIZE: 4.1 CM
LEFT ATRIUM VOLUME (MOD BIPLANE): 66 ML
LEFT INTERNAL DIMENSION IN SYSTOLE: 4.2 CM (ref 2.1–4)
LEFT VENTRICULAR INTERNAL DIMENSION IN DIASTOLE: 5.2 CM (ref 3.5–6)
LEFT VENTRICULAR POSTERIOR WALL IN END DIASTOLE: 0.6 CM
LEFT VENTRICULAR STROKE VOLUME: 50 ML
LEUKOCYTE ESTERASE UR QL STRIP: ABNORMAL
LVSV (TEICH): 50 ML
LYMPHOCYTES # BLD AUTO: 1.95 THOUSANDS/ÂΜL (ref 0.6–4.47)
LYMPHOCYTES NFR BLD AUTO: 34 % (ref 14–44)
MCH RBC QN AUTO: 34.5 PG (ref 26.8–34.3)
MCHC RBC AUTO-ENTMCNC: 32.3 G/DL (ref 31.4–37.4)
MCV RBC AUTO: 107 FL (ref 82–98)
MONOCYTES # BLD AUTO: 0.31 THOUSAND/ÂΜL (ref 0.17–1.22)
MONOCYTES NFR BLD AUTO: 5 % (ref 4–12)
MV E'TISSUE VEL-SEP: 4 CM/S
MV MEAN GRADIENT: 4 MMHG
MV PEAK A VEL: 1.44 M/S
MV PEAK E VEL: 88 CM/S
MV PEAK GRADIENT: 10 MMHG
MV STENOSIS PRESSURE HALF TIME: 94 MS
MV VALVE AREA BY CONTINUITY EQUATION: 1.3 CM2
MV VALVE AREA P 1/2 METHOD: 2.34 CM2
NEUTROPHILS # BLD AUTO: 3.25 THOUSANDS/ÂΜL (ref 1.85–7.62)
NEUTS SEG NFR BLD AUTO: 57 % (ref 43–75)
NITRITE UR QL STRIP: NEGATIVE
NON-SQ EPI CELLS URNS QL MICRO: ABNORMAL /HPF
NRBC BLD AUTO-RTO: 1 /100 WBCS
PH UR STRIP.AUTO: 7 [PH]
PLATELET # BLD AUTO: 113 THOUSANDS/UL (ref 149–390)
PMV BLD AUTO: 11.2 FL (ref 8.9–12.7)
POTASSIUM SERPL-SCNC: 3.6 MMOL/L (ref 3.5–5.3)
PROT SERPL-MCNC: 6 G/DL (ref 6.4–8.4)
PROT UR STRIP-MCNC: ABNORMAL MG/DL
RBC # BLD AUTO: 3.13 MILLION/UL (ref 3.81–5.12)
RBC #/AREA URNS AUTO: ABNORMAL /HPF
RIGHT ATRIUM AREA SYSTOLE A4C: 16.6 CM2
RIGHT VENTRICLE ID DIMENSION: 3.5 CM
RSV RNA RESP QL NAA+PROBE: NEGATIVE
SARS-COV-2 RNA RESP QL NAA+PROBE: NEGATIVE
SL CV LEFT ATRIUM LENGTH A2C: 5.7 CM
SL CV LV EF: 35
SL CV PED ECHO LEFT VENTRICLE DIASTOLIC VOLUME (MOD BIPLANE) 2D: 127 ML
SL CV PED ECHO LEFT VENTRICLE SYSTOLIC VOLUME (MOD BIPLANE) 2D: 77 ML
SODIUM SERPL-SCNC: 137 MMOL/L (ref 135–147)
SP GR UR STRIP.AUTO: 1.02 (ref 1–1.03)
T4 FREE SERPL-MCNC: 1.37 NG/DL (ref 0.61–1.12)
TR MAX PG: 28 MMHG
TR PEAK VELOCITY: 2.6 M/S
TRICUSPID ANNULAR PLANE SYSTOLIC EXCURSION: 2.2 CM
TRICUSPID VALVE PEAK REGURGITATION VELOCITY: 2.63 M/S
TSH SERPL DL<=0.05 MIU/L-ACNC: 0.33 UIU/ML (ref 0.45–4.5)
UROBILINOGEN UR STRIP-ACNC: <2 MG/DL
WBC # BLD AUTO: 5.73 THOUSAND/UL (ref 4.31–10.16)
WBC #/AREA URNS AUTO: ABNORMAL /HPF
WBC CLUMPS # UR AUTO: PRESENT /UL

## 2023-09-05 PROCEDURE — 97163 PT EVAL HIGH COMPLEX 45 MIN: CPT

## 2023-09-05 PROCEDURE — 84439 ASSAY OF FREE THYROXINE: CPT | Performed by: INTERNAL MEDICINE

## 2023-09-05 PROCEDURE — 85025 COMPLETE CBC W/AUTO DIFF WBC: CPT | Performed by: INTERNAL MEDICINE

## 2023-09-05 PROCEDURE — 80053 COMPREHEN METABOLIC PANEL: CPT | Performed by: INTERNAL MEDICINE

## 2023-09-05 PROCEDURE — C8929 TTE W OR WO FOL WCON,DOPPLER: HCPCS

## 2023-09-05 PROCEDURE — 99222 1ST HOSP IP/OBS MODERATE 55: CPT | Performed by: INTERNAL MEDICINE

## 2023-09-05 PROCEDURE — 99232 SBSQ HOSP IP/OBS MODERATE 35: CPT | Performed by: EMERGENCY MEDICINE

## 2023-09-05 PROCEDURE — 0241U HB NFCT DS VIR RESP RNA 4 TRGT: CPT | Performed by: INTERNAL MEDICINE

## 2023-09-05 PROCEDURE — 99221 1ST HOSP IP/OBS SF/LOW 40: CPT | Performed by: NURSE PRACTITIONER

## 2023-09-05 PROCEDURE — 81001 URINALYSIS AUTO W/SCOPE: CPT | Performed by: INTERNAL MEDICINE

## 2023-09-05 PROCEDURE — 99233 SBSQ HOSP IP/OBS HIGH 50: CPT | Performed by: INTERNAL MEDICINE

## 2023-09-05 PROCEDURE — 87086 URINE CULTURE/COLONY COUNT: CPT | Performed by: INTERNAL MEDICINE

## 2023-09-05 PROCEDURE — 97167 OT EVAL HIGH COMPLEX 60 MIN: CPT

## 2023-09-05 PROCEDURE — 84443 ASSAY THYROID STIM HORMONE: CPT | Performed by: INTERNAL MEDICINE

## 2023-09-05 RX ORDER — LEVOTHYROXINE SODIUM 0.1 MG/1
100 TABLET ORAL DAILY
Status: DISCONTINUED | OUTPATIENT
Start: 2023-09-06 | End: 2023-09-08 | Stop reason: HOSPADM

## 2023-09-05 RX ADMIN — METOPROLOL TARTRATE 25 MG: 25 TABLET, FILM COATED ORAL at 09:55

## 2023-09-05 RX ADMIN — CLOPIDOGREL BISULFATE 75 MG: 75 TABLET, FILM COATED ORAL at 09:50

## 2023-09-05 RX ADMIN — PERFLUTREN 0.6 ML/MIN: 6.52 INJECTION, SUSPENSION INTRAVENOUS at 08:50

## 2023-09-05 RX ADMIN — LEVOTHYROXINE SODIUM 112 MCG: 112 TABLET ORAL at 09:49

## 2023-09-05 RX ADMIN — HEPARIN SODIUM 5000 UNITS: 5000 INJECTION INTRAVENOUS; SUBCUTANEOUS at 05:11

## 2023-09-05 RX ADMIN — METOPROLOL TARTRATE 25 MG: 25 TABLET, FILM COATED ORAL at 22:31

## 2023-09-05 RX ADMIN — ACETAMINOPHEN 975 MG: 325 TABLET, FILM COATED ORAL at 20:14

## 2023-09-05 RX ADMIN — HEPARIN SODIUM 5000 UNITS: 5000 INJECTION INTRAVENOUS; SUBCUTANEOUS at 13:54

## 2023-09-05 RX ADMIN — Medication 2 G: at 18:39

## 2023-09-05 RX ADMIN — TORSEMIDE 20 MG: 20 TABLET ORAL at 22:31

## 2023-09-05 RX ADMIN — PREDNISONE 5 MG: 5 TABLET ORAL at 09:49

## 2023-09-05 RX ADMIN — GABAPENTIN 300 MG: 300 CAPSULE ORAL at 09:49

## 2023-09-05 RX ADMIN — ACETAMINOPHEN 975 MG: 325 TABLET, FILM COATED ORAL at 12:23

## 2023-09-05 RX ADMIN — CEFTRIAXONE SODIUM 1000 MG: 10 INJECTION, POWDER, FOR SOLUTION INTRAVENOUS at 18:39

## 2023-09-05 RX ADMIN — ISOSORBIDE MONONITRATE 30 MG: 30 TABLET, EXTENDED RELEASE ORAL at 09:54

## 2023-09-05 RX ADMIN — HEPARIN SODIUM 5000 UNITS: 5000 INJECTION INTRAVENOUS; SUBCUTANEOUS at 22:31

## 2023-09-05 RX ADMIN — Medication 2 G: at 12:21

## 2023-09-05 RX ADMIN — GABAPENTIN 300 MG: 300 CAPSULE ORAL at 18:39

## 2023-09-05 RX ADMIN — ACETAMINOPHEN 975 MG: 325 TABLET, FILM COATED ORAL at 04:45

## 2023-09-05 RX ADMIN — Medication 2 G: at 09:50

## 2023-09-05 RX ADMIN — ALLOPURINOL 150 MG: 300 TABLET ORAL at 09:49

## 2023-09-05 RX ADMIN — SERTRALINE 25 MG: 25 TABLET, FILM COATED ORAL at 09:49

## 2023-09-05 RX ADMIN — TORSEMIDE 20 MG: 20 TABLET ORAL at 09:54

## 2023-09-05 NOTE — ASSESSMENT & PLAN NOTE
Patient fell on Plavix, presented as a trauma. Evaluated with CT head, neck, chest, abdomen, pelvis, without acute findings  Fall likely secondary to febrile illness versus cardiogenic syncope, medical admit    Empiric antibiotics given covering UTI, patient has history of this. Medicine is also planning echo inpatient for history of mechanical aortic valve. Patient continues to be absent symptoms or exam findings concerning for traumatic etiology of her fall/sycnope or sequela of her fall. Patient wears a cervical collar at home for previous cervical spine fracture, will continue here. Recommend gerontology consult for assistance in management of patient's home medications. Trauma will sign off.

## 2023-09-05 NOTE — CONSULTS
[address] 80 y.o. female MRN: [id number]  [facility] Encounter: [id number]    Assessment/Plan     Assessment:  Pressure injury of deep tissue  Urinary incontinence unspecified  Ambulatory dysfunction    Plan:  1. Patient has a deep tissue injury of her vulva and medial thighs which was present on admission. Entire area has a dark purple discoloration, but skin is intact with no drainage present. Entire area measures 7 x 12 x 0 cm. Deep tissue damage is likely to evolve into either a stage III or IV pressure ulcer. 2.  Due to patient's history of frequent urinary incontinence, we will recommend area be cleansed with soap and water, pat dry, apply Triad paste twice a day and as needed. 3.  Discussed with patient today about possibly ordering a hospital bed for patient to have at home to help her better offload pressure from her wound due to patient sleeping in a recliner at night. Patient declined but reports she may consider later. 4.  We will recommend patient follow-up wound center after discharge from the hospital  5. Patient's bilateral heels assessed. Heels are blanchable and intact. 6.  We will place preventative wound care orders  7. We will continue to follow patient weekly for wound assessment    History of Present Illness   HPI:  Edgar De Luna is a 80 y.o. female who was admitted yesterday after sustaining a fall at home. Wound care was consulted for a deep tissue injury of her vulva which was present on admission. Patient's granddaughter who is present with patient today reports that the area has been present off and on for quite some time. Patient's granddaughter reports that she sleeps in a recliner chair every night due to not being able to tolerate laying in bed. Granddaughter reports area occasionally opens up and drains, but currently there is no drainage present on assessment today.   Ran daughter reports that they have tried using Vaseline and calazime cream in the past which did not help. Patient reports some tenderness to the area with palpation. Patient is not diabetic. No fevers or chills currently. Consults    Review of Systems   Constitutional: Negative. HENT: Negative for ear pain and hearing loss. Eyes: Negative for pain. Respiratory: Negative for chest tightness and shortness of breath. Cardiovascular: Negative for chest pain, palpitations and leg swelling. Gastrointestinal: Negative for diarrhea, nausea and vomiting. Genitourinary: Negative for dysuria. Musculoskeletal: Positive for gait problem. Skin: Positive for wound. Neurological: Negative for tremors and weakness. Psychiatric/Behavioral: Negative for behavioral problems, confusion and suicidal ideas. Historical Information   History reviewed. No pertinent past medical history. History reviewed. No pertinent surgical history. Social History   Social History     Substance and Sexual Activity   Alcohol Use Not Currently     Social History     Substance and Sexual Activity   Drug Use Not Currently     E-Cigarette/Vaping     E-Cigarette/Vaping Substances     Social History     Tobacco Use   Smoking Status Never   Smokeless Tobacco Never      Family History: History reviewed. No pertinent family history.     Meds/Allergies   current meds:   Current Facility-Administered Medications   Medication Dose Route Frequency   • acetaminophen (TYLENOL) tablet 975 mg  975 mg Oral Q6H PRN   • albuterol (PROVENTIL HFA,VENTOLIN HFA) inhaler 2 puff  2 puff Inhalation Q6H PRN   • allopurinol (ZYLOPRIM) tablet 150 mg  150 mg Oral Daily   • ceftriaxone (ROCEPHIN) 1 g/50 mL in dextrose IVPB  1,000 mg Intravenous Q24H   • clopidogrel (PLAVIX) tablet 75 mg  75 mg Oral Daily   • Diclofenac Sodium (VOLTAREN) 1 % topical gel 2 g  2 g Topical 4x Daily   • gabapentin (NEURONTIN) capsule 300 mg  300 mg Oral BID   • heparin (porcine) subcutaneous injection 5,000 Units  5,000 Units Subcutaneous Q8H [address]   • isosorbide mononitrate (IMDUR) 24 hr tablet 30 mg  30 mg Oral Daily   • levothyroxine tablet 112 mcg  112 mcg Oral Daily   • metoprolol tartrate (LOPRESSOR) tablet 25 mg  25 mg Oral Q12H [address]   • ondansetron (ZOFRAN) injection 4 mg  4 mg Intravenous Q6H PRN   • predniSONE tablet 5 mg  5 mg Oral Daily   • sertraline (ZOLOFT) tablet 25 mg  25 mg Oral Daily   • torsemide (DEMADEX) tablet 20 mg  20 mg Oral BID     Not on File    Objective   Vitals: Blood pressure 105/59, pulse 67, temperature (!) 97.4 °F (36.3 °C), resp. rate 18, height 4' 11" (1.499 m), weight 72.6 kg (160 lb), SpO2 94 %. Wounds:     Wound 09/05/23 Pressure Injury Perineum Inner (Active)   Treatments Site care 09/05/23 0512   Dressing Open to air 09/05/23 0512         Physical Exam  Vitals reviewed. Constitutional:       General: She is not in acute distress. Appearance: Normal appearance. She is obese. HENT:      Head: Normocephalic and atraumatic. Eyes:      General:         Right eye: No discharge. Left eye: No discharge. Neck:      Comments: Neck brace in place  Pulmonary:      Effort: Pulmonary effort is normal. No respiratory distress. Genitourinary:     Comments: Vulva edematous with dark purple discoloration secondary to deep tissue injury. No open wounds or drainage present. See media picture below  Musculoskeletal:      Comments: Ambulates with Ax2 with roller walker   Skin:     General: Skin is warm and dry. Findings: Wound present. Neurological:      General: No focal deficit present. Mental Status: She is alert and oriented to person, place, and time. Mental status is at baseline. Psychiatric:         Mood and Affect: Mood normal.         Behavior: Behavior normal.         Thought Content: Thought content normal.         Judgment: Judgment normal.                Lab Results: I have personally reviewed pertinent reports.     Imaging: I have personally reviewed pertinent reports. EKG, Pathology, and Other Studies: I have personally reviewed pertinent reports. Code Status: Level 1 - Full Code    Counseling / Coordination of Care  Total floor / unit time spent today 15 minutes. Greater than 50% of total time was spent with the patient and / or family counseling and / or coordination of care. A description of the counseling / coordination of care: Wound care plan of care.

## 2023-09-05 NOTE — CASE MANAGEMENT
Case Management Assessment & Discharge Planning Note    Patient name Andrea Spear  Location [address]/[facility] 701-01 MRN [id number]  : 1934 Date 2023       Current Admission Date: 2023  Current Admission Diagnosis:Febrile illness   Patient Active Problem List    Diagnosis Date Noted   • Fall 2023   • Pressure injury of deep tissue 2023   • Febrile illness 2023   • Near syncope 2023   • Rheumatoid arthritis ([address]) 2023   • Diabetes ([address]) 2023   • History of renal artery stenosis 2023   • Family history of gout 2023   • Peripheral polyneuropathy 2023   • Hypertension 2023   • Lupus ([address]) 2023   • Hyperlipidemia 2023   • Stroke ([address]) 2023      LOS (days): 1  Geometric Mean LOS (GMLOS) (days):   Days to GMLOS:     OBJECTIVE:    Risk of Unplanned Readmission Score: 19.27         Current admission status: Inpatient       Preferred Pharmacy:   [facility], [facility] [address] [facility] [address]  [address] [place], [place] [id number]  Phone: [phone] Fax: [phone]    Primary Care Provider: Frida Patel DO    Primary Insurance: [facility]  Secondary Insurance:     ASSESSMENT:  [address] Representative - Daughter   Primary Phone: [phone] (Mobile)                 Readmission Root Cause  30 Day Readmission: No    Patient Information  Admitted from[de-identified] Home  Mental Status: Alert  During Assessment patient was accompanied by: Not accompanied during assessment  Assessment information provided by[de-identified] Patient  Primary Caregiver: Self  Support Systems: Self, Daughter  [place] of Residence: [facility] [address] [place] [address] do you live in?: [address] entry access options.  Select all that apply.: Ramp  Type of Current Residence: Other (Comment) (In-Law suite attached to her daughters house.)  In the last 12 months, was there a time when you were not able to pay the mortgage or rent on time?: No  In the last 12 months, how many places have you lived?: 1  In the last 12 months, was there a time when you did not have a steady place to sleep or slept in a shelter (including now)?: No  Homeless/housing insecurity resource given?: N/A  Living Arrangements: Lives w/ Daughter  Is patient a ?: No    Activities of Daily Living Prior to Admission  Functional Status: Independent  Completes ADLs independently?: Yes  Ambulates independently?: Yes  Does patient use assisted devices?: Yes  Assisted Devices (DME) used: [employer]  Does patient have a history of Outpatient Therapy (PT/OT)?: No  Does the patient have a history of Short-Term Rehab?: No  Does patient have a history of HHC?: Yes ([facility])      Patient Information Continued  Income Source: Pension/jail  Does patient have prescription coverage?: Yes  Within the past 12 months, you worried that your food would run out before you got the money to buy more.: Never true  Within the past 12 months, the food you bought just didn't last and you didn't have money to get more.: Never true  Food insecurity resource given?: N/A  Does patient receive dialysis treatments?: No  Does patient have a history of substance abuse?: No  Does patient have a history of Mental Health Diagnosis?: No      Means of Transportation  Means of Transport to Appts[de-identified] Family transport  In the past 12 months, has lack of transportation kept you from medical appointments or from getting medications?: No  In the past 12 months, has lack of transportation kept you from meetings, work, or from getting things needed for daily living?: No  Was application for public transport provided?: N/A        DISCHARGE DETAILS:    Discharge planning discussed with[de-identified] Patient  Freedom of Choice: Yes  Comments - Freedom of Choice: FOC discussed.   Patient is hoping for DC home with [facility] PT/OT  CM contacted family/caregiver?: Yes  Were Treatment Team discharge recommendations reviewed with patient/caregiver?: Yes  Did patient/caregiver verbalize understanding of patient care needs?: Yes       Other Referral/Resources/Interventions Provided:  Interventions: HHC  Referral Comments: Recs for STR vs HHC. Pt is wanting to go home with [facility] Pt/OT with [address], she has used them previously. Referral for [address] sent in [address] CM reviewed d/c planning process including the following: identifying help at home, patient preference for d/c planning needs, Discharge Lounge, [facility] Meds to Bed program, availability of treatment team to discuss questions or concerns patient and/or family may have regarding understanding medications and recognizing signs and symptoms once discharged. CM also encouraged patient to follow up with all recommended appointments after discharge. Patient advised of importance for patient and family to participate in managing patient’s medical well being. Information:  Info obtain from pt. This CM met with pt at bedside, introduced myself and explained my role. Pt lives in [facility] [place] [facility] [place]. [employer] is her DME. Pt has used [address] previously and would like them again. Pt is vaccinated for COVID x 2. [employer] can provide transportation at [place].

## 2023-09-05 NOTE — PROGRESS NOTES
[facility]  Progress Note  Name: Lionel Jackson  MRN: [id number]  Unit/Bed#: [facility] 701-01 I Date of Admission: 9/4/2023   Date of Service: 9/5/2023 I Hospital Day: 1    Assessment/Plan   Fall  Assessment & Plan  Patient fell on Plavix, presented as a trauma. Evaluated with CT head, neck, chest, abdomen, pelvis, without acute findings  Fall likely secondary to febrile illness versus cardiogenic syncope, medical admit    Empiric antibiotics given covering UTI, patient has history of this. Medicine is also planning echo inpatient for history of mechanical aortic valve. Patient continues to be absent symptoms or exam findings concerning for traumatic etiology of her fall/sycnope or sequela of her fall. Patient wears a cervical collar at home for previous cervical spine fracture, will continue here. Recommend gerontology consult for assistance in management of patient's home medications. Trauma will sign off. TRAUMA TERTIARY SURVEY NOTE    VTE Prophylaxis:Heparin     Disposition: inpatient, medical    Code status:  Level 1 - Full Code    Consultants: IP CONSULT TO CARDIOLOGY    Subjective   Mechanism of Injury:Fall     Chief Complaint: fall, possible syncope    HPI/Last 24 hour events: Loretta Lopez is a 80 y.o. female on plavix, who presents after having a fall. Walks with walker, semi witnessed by grand daughter. Patient was walking with walker, saw her fall but did not see how. Family states there was a 1-2 min period of LOC/minimally responsive episode, patient is amnestic to the event. Patient's daughter states that she has had a couple episodes like this in the past where she becomes minimally responsive and may have syncopized.  No events overnight     Objective   Vitals:   Temp:  [97.4 °F (36.3 °C)-100.4 °F (38 °C)] 97.4 °F (36.3 °C)  HR:  [63-94] 63  Resp:  [17-19] 18  BP: (101-179)/(53-77) 109/60    I/O       09/03 0701 09/04 0700 09/04 0701 09/05 0700          Unmeasured Stool Occurrence  1 x           Physical Exam:   GENERAL APPEARANCE: NAD  NEURO: Patient is speaking clearly in complete sentences. Patient is answering appropriately and able follow commands. Patient is moving all four extremities spontaneously. No facial droop. Tongue midline. HEENT: PERRL, Moist mucous membranes, external ears normal, nose normal  Neck: No cervical adenopathy  CV: RRR. No murmurs, rubs, gallops  LUNGS: Clear to auscultation: No wheezes, stridor, rhonchi, rales  GI: Abdomen non-distended. Soft. Non-tender and without rebound or guarding   : Deferred at this time  MSK: No deformity. Skin: Warm and dry  Capillary refill: <2 seconds      Invasive Devices     Peripheral Intravenous Line  Duration           Peripheral IV 09/04/23 Right Antecubital <1 day    Peripheral IV 09/04/23 Right;Ventral (anterior) Hand <1 day                   1. Before the illness or injury that brought you to the Emergency, did you need someone to help you on a regular basis? 1=Yes   2. Since the illness or injury that brought you to the Emergency, have you needed more help than usual to take care of yourself? 1=Yes   3. Have you been hospitalized for one or more nights during the past 6 months (excluding a stay in the Emergency Department)? 1=Yes   4. In general, do you see well? 0=Yes   5. In general, do you have serious problems with your memory? 1=Yes   6. Do you take more than three different medications everyday? 1=Yes   TOTAL   5     Did you order a geriatric consult if the score was 2 or greater?: n/a         Lab Results: Results: I have personally reviewed all pertinent laboratory/tests results    Imaging Results: I have personally reviewed pertinent reports.     Chest Xray(s): negative for acute findings   FAST exam(s): negative for acute findings   CT Scan(s): negative for acute findings   Additional Xray(s): negative for acute findings

## 2023-09-05 NOTE — PROGRESS NOTES
[address] [facility]  Progress Note  Name: Kelly Golden  MRN: [id number]  Unit/Bed#: [facility] 701-01 I Date of Admission: 9/4/2023   Date of Service: 9/5/2023 I Hospital Day: 1    Assessment/Plan   * Acute cystitis without hematuria  Assessment & Plan  · Patient does acknowledge symptoms of urinary frequency/burning with mild suprapubic discomfort. However granddaughter reports that she has a history of urinary tract infection and urinary incontinence. Patient presents with low-grade temperature. · Continue with empiric antibiotic therapy pending final culture results. Unfortunately UA and cultures were obtained after patient receiving 1 dose of ceftriaxone. · Monitor temperature curve  · Follow-up on blood cultures. Near syncope  Assessment & Plan  Patient reported recurrent episodes of near syncope. She has had multiple falls previously as per the granddaughter. Suspect secondary to UTI. Management as above. · orthostatic blood pressure checks  · Telemetry monitoring  · Appreciate cardiology recommendations. · PT/OT  · Echocardiogram regarding aortic valve. Patient with history of bioprosthetic valve. · Reduced dosing of Neurontin to twice daily from 3 times daily. Hold Robaxin. Continue with supportive care. Pancreatic lesion  Assessment & Plan  · CT abdomen "Apparent hyperenhancing 1.1 cm pancreatic body lesion, with soft tissue prominence unchanged compared to 10/8/2012 with new true or pseudo hyperenhancement. Findings can represent either an artifact or presence of a pancreatic neoplasm with low malignant potential such as neuroendocrine tumor"  · Tried to order MRI for further evaluation. I was reached out by radiology that this can be done as outpatient. Especially with patient's CKD and getting contrast study on admission. Will have this ordered as outpatient by PCP. Pressure injury of deep tissue  Assessment & Plan  POA.  Wound care consult    Fall  Assessment & Plan  · Evaluated by trauma team.  Further evaluation, no reports of acute fractures were found. · PT OT evaluation. · Check orthostatic blood pressure. History of stroke  Assessment & Plan  History noted. Outpatient follow-up  Continue Plavix. Hypertension  Assessment & Plan  · Continue with torsemide and metoprolol therapy. Patient also on Imdur. Family history of gout  Assessment & Plan  · Currently on allopurinol 150 daily    Diabetes ([address])  Assessment & Plan  · Sliding scale insulin. · Maintain blood glucose 140-180    Rheumatoid arthritis (HCC)  Assessment & Plan  · Prednisone 5 mg daily             VTE Pharmacologic Prophylaxis:   Moderate Risk (Score 3-4) - Pharmacological DVT Prophylaxis Ordered: heparin. Patient Centered Rounds: I performed bedside rounds with nursing staff today. Discussions with Specialists or Other Care Team Provider: Radiology     Education and Discussions with Family / Patient: Updated  (daughter) at bedside. Total Time Spent on Date of Encounter in care of patient: 45 minutes This time was spent on one or more of the following: performing physical exam; counseling and coordination of care; obtaining or reviewing history; documenting in the medical record; reviewing/ordering tests, medications or procedures; communicating with other healthcare professionals and discussing with patient's family/caregivers. Current Length of Stay: 1 day(s)  Current Patient Status: Inpatient   Certification Statement: The patient will continue to require additional inpatient hospital stay due to Iv ABX , cULTURES PENDING  Discharge Plan: Anticipate discharge in 48 hrs to discharge location to be determined pending rehab evaluations. Code Status: Level 1 - Full Code    Subjective:   No acute complaints. Feeling doing better than yesterday. Awake and oriented. Denies CP, SOB and abdominal pain.     Objective:     Vitals:   Temp (24hrs), Av.1 °F (36.7 °C), Min:97.4 °F (36.3 °C), Max:99 °F (37.2 °C)    Temp:  [97.4 °F (36.3 °C)-99 °F (37.2 °C)] 98 °F (36.7 °C)  HR:  [62-94] 65  Resp:  [18] 18  BP: (101-127)/(57-75) 109/65  SpO2:  [93 %-97 %] 95 %  Body mass index is 32.32 kg/m². Input and Output Summary (last 24 hours):   No intake or output data in the 24 hours ending 23 1634    Physical Exam:   Physical Exam  Vitals and nursing note reviewed. Constitutional:       General: She is not in acute distress. Appearance: She is well-developed. HENT:      Head: Normocephalic and atraumatic. Eyes:      Conjunctiva/sclera: Conjunctivae normal.   Cardiovascular:      Rate and Rhythm: Normal rate and regular rhythm. Heart sounds: No murmur heard. Pulmonary:      Effort: Pulmonary effort is normal. No respiratory distress. Breath sounds: Normal breath sounds. Abdominal:      General: Bowel sounds are normal.      Palpations: Abdomen is soft. Tenderness: There is no abdominal tenderness. Musculoskeletal:      Cervical back: Neck supple. Right lower leg: Edema (trace) present. Left lower leg: Edema (trace) present. Skin:     General: Skin is warm and dry. Capillary Refill: Capillary refill takes less than 2 seconds. Neurological:      General: No focal deficit present. Mental Status: She is alert and oriented to person, place, and time.    Psychiatric:         Mood and Affect: Mood normal.         Behavior: Behavior normal.         Additional Data:     Labs:  Results from last 7 days   Lab Units 23  0527   WBC Thousand/uL 5.73   HEMOGLOBIN g/dL 10.8*   HEMATOCRIT % 33.4*   PLATELETS Thousands/uL 113*   NEUTROS PCT % 57   LYMPHS PCT % 34   MONOS PCT % 5   EOS PCT % 2     Results from last 7 days   Lab Units 23  0537   SODIUM mmol/L 137   POTASSIUM mmol/L 3.6   CHLORIDE mmol/L 98   CO2 mmol/L 31   BUN mg/dL 67*   CREATININE mg/dL 1.67*   ANION GAP mmol/L 8   CALCIUM mg/dL 8.2*   ALBUMIN g/dL 3.3*   TOTAL BILIRUBIN mg/dL 0.33   ALK PHOS U/L 50   ALT U/L 12   AST U/L 17   GLUCOSE RANDOM mg/dL 104     Results from last 7 days   Lab Units 09/04/23  1311   INR  0.97             Results from last 7 days   Lab Units 09/04/23  1548 09/04/23  1311   LACTIC ACID mmol/L 1.7 3.5*   PROCALCITONIN ng/ml  --  0.33*       Lines/Drains:  Invasive Devices     Peripheral Intravenous Line  Duration           Peripheral IV 09/04/23 Right Antecubital 1 day    Peripheral IV 09/04/23 Right;Ventral (anterior) Hand 1 day                  Telemetry:  Telemetry Orders (From admission, onward)             24 Hour Telemetry Monitoring  Continuous x 24 Hours (Telem)        Question:  Reason for 24 Hour Telemetry  Answer:  Syncope suspected to be cardiac in origin                 Telemetry Reviewed: Normal Sinus Rhythm  Indication for Continued Telemetry Use: Syncope             Imaging: Reviewed radiology reports from this admission including: chest xray    Recent Cultures (last 7 days):   Results from last 7 days   Lab Units 09/04/23  1604   BLOOD CULTURE  Received in Microbiology Lab. Culture in Progress. Received in Microbiology Lab. Culture in Progress.        Last 24 Hours Medication List:   Current Facility-Administered Medications   Medication Dose Route Frequency Provider Last Rate   • acetaminophen  975 mg Oral Q6H PRN Cameron Benítez PA-C     • albuterol  2 puff Inhalation Q6H PRN Hetul Hatch, DO     • allopurinol  150 mg Oral Daily Hetul Hatch, DO     • cefTRIAXone  1,000 mg Intravenous Q24H Hetul Hatch, DO     • clopidogrel  75 mg Oral Daily Hetul Hatch, DO     • Diclofenac Sodium  2 g Topical 4x Daily Cameron Benítez PA-C     • gabapentin  300 mg Oral BID Hetul Hatch, DO     • heparin (porcine)  5,000 Units Subcutaneous [facility] Bindu Ozuna MD     • isosorbide mononitrate  30 mg Oral Daily Hetul Hatch, DO     • [START ON 9/6/2023] levothyroxine  100 mcg Oral Daily Deshaun Park MD     • metoprolol tartrate  25 mg Oral Q12H [address] Hetul Hatch, DO     • ondansetron  4 mg Intravenous Q6H PRN Hetul Hatch, DO     • predniSONE  5 mg Oral Daily Hetul Hatch, DO     • sertraline  25 mg Oral Daily Hetul Hatch, DO     • torsemide  20 mg Oral BID Hetul Hatch, DO          Today, Patient Was Seen By: Srinath Landin MD    **Please Note: This note may have been constructed using a voice recognition system. **

## 2023-09-05 NOTE — PLAN OF CARE
Patient is alert/oriented, afebrile, with intermittent left shoulder pain, and denies SOB on room air. Patient admitted to [facility] since last night until around [phone], transferred to  for telemetry monitoring. Report given to the receiving RN.         Problem: Prexisting or High Potential for Compromised Skin Integrity  Goal: Skin integrity is maintained or improved  Description: INTERVENTIONS:  - Identify patients at risk for skin breakdown  - Assess and monitor skin integrity  - Assess and monitor nutrition and hydration status  - Monitor labs   - Assess for incontinence   - Turn and reposition patient  - Assist with mobility/ambulation  - Relieve pressure over bony prominences  - Avoid friction and shearing  - Provide appropriate hygiene as needed including keeping skin clean and dry  - Evaluate need for skin moisturizer/barrier cream  - Collaborate with interdisciplinary team   - Patient/family teaching  - Consider wound care consult   9/5/2023 0411 by Bill Hutchison RN  Outcome: Progressing  9/5/2023 0411 by Bill Hutchison RN  Outcome: Progressing     Problem: MOBILITY - ADULT  Goal: Maintain or return to baseline ADL function  Description: INTERVENTIONS:  -  Assess patient's ability to carry out ADLs; assess patient's baseline for ADL function and identify physical deficits which impact ability to perform ADLs (bathing, care of mouth/teeth, toileting, grooming, dressing, etc.)  - Assess/evaluate cause of self-care deficits   - Assess range of motion  - Assess patient's mobility; develop plan if impaired  - Assess patient's need for assistive devices and provide as appropriate  - Encourage maximum independence but intervene and supervise when necessary  - Involve family in performance of ADLs  - Assess for home care needs following discharge   - Consider OT consult to assist with ADL evaluation and planning for discharge  - Provide patient education as appropriate  9/5/2023 0411 by Bill Hutchison RN  Outcome: Progressing  9/5/2023 0411 by Zully Felton RN  Outcome: Progressing  Goal: Maintains/Returns to pre admission functional level  Description: INTERVENTIONS:  - Perform BMAT or MOVE assessment daily.   - Set and communicate daily mobility goal to care team and patient/family/caregiver.    - Collaborate with rehabilitation services on mobility goals if consulted  - Out of bed for toileting  - Record patient progress and toleration of activity level   Outcome: Progressing     Problem: CARDIOVASCULAR - ADULT  Goal: Maintains optimal cardiac output and hemodynamic stability  Description: INTERVENTIONS:  - Monitor I/O, vital signs and rhythm  - Monitor for S/S and trends of decreased cardiac output  - Administer and titrate ordered vasoactive medications to optimize hemodynamic stability  - Assess quality of pulses, skin color and temperature  - Assess for signs of decreased coronary artery perfusion  - Instruct patient to report change in severity of symptoms  Outcome: Progressing  Goal: Absence of cardiac dysrhythmias or at baseline rhythm  Description: INTERVENTIONS:  - Continuous cardiac monitoring, vital signs, obtain 12 lead EKG if ordered  - Administer antiarrhythmic and heart rate control medications as ordered  - Monitor electrolytes and administer replacement therapy as ordered  Outcome: Progressing     Problem: GENITOURINARY - ADULT  Goal: Maintains or returns to baseline urinary function  Description: INTERVENTIONS:  - Assess urinary function  - Encourage oral fluids to ensure adequate hydration if ordered  - Administer IV fluids as ordered to ensure adequate hydration  - Administer ordered medications as needed  - Offer frequent toileting  - Follow urinary retention protocol if ordered  Outcome: Progressing  Goal: Absence of urinary retention  Description: INTERVENTIONS:  - Assess patient’s ability to void and empty bladder  - Monitor I/O  - Bladder scan as needed  - Discuss with physician/AP medications to alleviate retention as needed  - Discuss catheterization for long term situations as appropriate  Outcome: Progressing  Goal: Urinary catheter remains patent  Description: INTERVENTIONS:  - Assess patency of urinary catheter  - If patient has a chronic coelho, consider changing catheter if non-functioning  - Follow guidelines for intermittent irrigation of non-functioning urinary catheter  Outcome: Progressing     Problem: SKIN/TISSUE INTEGRITY - ADULT  Goal: Skin Integrity remains intact(Skin Breakdown Prevention)  Description: Assess:  -Perform Marito assessment every shift  -Clean and moisturize skin every shift   -Inspect skin when repositioning, toileting, and assisting with ADLS  -Assess extremities for adequate circulation and sensation     Bed Management:  -Have minimal linens on bed & keep smooth, unwrinkled  -Change linens as needed when moist or perspiring  -Avoid sitting or lying in one position for more than 2 hours while in bed  -Keep HOB at least 30 degrees     Toileting:  -Offer bedside commode  -Assess for incontinence every  2 hours      Activity:  -Mobilize patient as needed  -Encourage activity and walks on unit  -Encourage or provide ROM exercises   -Turn and reposition patient every 2 Hours  -Use appropriate equipment to lift or move patient in bed  -Instruct/ Assist with weight shifting every 2 hours when out of bed in chair  -Consider limitation of chair time 2 hour intervals    Skin Care:  -Avoid use of baby powder, tape, friction and shearing, hot water or constrictive clothing  -Relieve pressure over bony prominences using foam dressing/pillows  -Do not massage red bony areas    Next Steps:  -Teach patient strategies to minimize risks such as  using call light for needs  -Consider consults to  interdisciplinary teams such as wound care  Outcome: Progressing  Goal: Incision(s), wounds(s) or drain site(s) healing without S/S of infection  Description: INTERVENTIONS  - Assess and document dressing, incision, wound bed, drain sites and surrounding tissue  - Provide patient and family education  - Perform skin care/dressing changes every shift/as needed  Outcome: Progressing  Goal: Pressure injury heals and does not worsen  Description: Interventions:  - Implement low air loss mattress or specialty surface (Criteria met)  - Apply silicone foam dressing  - Instruct/assist with weight shifting every 120 minutes when in chair   - Limit chair time to 2 hour intervals  - Apply fecal or urinary incontinence containment device   - Perform passive or active ROM as needed  - Turn and reposition patient & offload bony prominences every 2 hours   - Utilize friction reducing device or surface for transfers   - Consider nutrition services referral as needed  Outcome: Progressing     Problem: PAIN - ADULT  Goal: Verbalizes/displays adequate comfort level or baseline comfort level  Description: Interventions:  - Encourage patient to monitor pain and request assistance  - Assess pain using appropriate pain scale  - Administer analgesics based on type and severity of pain and evaluate response  - Implement non-pharmacological measures as appropriate and evaluate response  - Consider cultural and social influences on pain and pain management  - Notify physician/advanced practitioner if interventions unsuccessful or patient reports new pain  Outcome: Progressing

## 2023-09-05 NOTE — ASSESSMENT & PLAN NOTE
· CT abdomen "Apparent hyperenhancing 1.1 cm pancreatic body lesion, with soft tissue prominence unchanged compared to 10/8/2012 with new true or pseudo hyperenhancement. Findings can represent either an artifact or presence of a pancreatic neoplasm with low malignant potential such as neuroendocrine tumor"  · Tried to order MRI for further evaluation. I was reached out by radiology that this can be done as outpatient. Especially with patient's CKD and getting contrast study on admission. Will have this ordered as outpatient by PCP.

## 2023-09-05 NOTE — RESTORATIVE TECHNICIAN NOTE
Restorative Technician Note      Patient Name: Stella Mcguire     Note Type: Mobility  Patient Position Upon Consult: Supine  Activity Performed: Repositioned  Education Provided: Yes  Patient Position at End of Consult: Supine;  All needs within reach; Bed/Chair alarm activated      Raji MCINTYRE, Restorative Technician, [employer]

## 2023-09-05 NOTE — CONSULTS
Consultation - Cardiology Team One  Carlie Lanes 80 y.o. female MRN: [id number]  Unit/Bed#: [facility] 701-01 Encounter: [id number]    Inpatient consult to Cardiology  Consult performed by: JIM Dill  Consult ordered by: Joselyn Beasley DO          Physician Requesting Consult: Otto Kim MD  Reason for Consult / Principal Problem: Syncope       Assessment/ Plan    1. Questionable Syncope  Reviewed telemetry showing NSR without events  Consider Zio patch at discharge   Echocardiogram pending   Check orthostatic BP     2. HFrEF  EF 30% on last echocardiogram from 2/2023  Family declined ischemic workup in the past   Continue medical management: metoprolol, imdur and torsemide   Euvolemic on exam   Recommend 2 gram Na diet     3. Hypertension with history of SHUKRI s/p stenting on plavix    4. Bio AVR and MVR with ring   Reviewed on echocardiogram       History of Present Illness   HPI: Carlie Lanes is a 80y.o. year old female who has a history of Chronic HFrEF, hypertension, hyperlipidemia, severe aortic stenosis s/p bioprosthetic AVR, severe MR s/p MVR with ring,  type II diabetes, SHUKRI s/p stenting, CVA and ambulatory dysfunction. She follows with cardiologist Dr. Hernan Sanchez. She presents to [facility] ER via EMS s/p questionable syncope. Per patient, she was walking to bedroom to put her pants on. She got into the doorway and felt her legs giving out on her. She let herself fall and denies LOC. Family reports they did not see her fall but came up to her immediately after her fall and she was not responding to them for about 2 minutes. The daughter reports, she was blinking her eyes. Patient reports no dizziness, lightheaded, palpitations, chest pain prior or after event. She does report her legs becoming weak and knew she was going to fall. Patient reports several falls throughout the years. Daughter reports she has witnessed her mother becoming "almost frozen" then comes to.  She denies loss of bladder or bowel, biting of tongue or jerky movements. Patient lives at home with family. She uses a walker to ambulate. She denies tobacco or alcohol use. Telemetry reviewed personally:   Sinus rhythm HR 55-70 bpm    Review of Systems   Constitutional: Positive for malaise/fatigue. Negative for chills and fever. HENT: Negative for congestion. Cardiovascular: Negative for chest pain, dyspnea on exertion, leg swelling, orthopnea and palpitations. Respiratory: Negative for shortness of breath. Musculoskeletal: Negative for falls. Gastrointestinal: Negative for bloating, nausea and vomiting. Neurological: Negative for dizziness and light-headedness. Psychiatric/Behavioral: Negative for altered mental status. All other systems reviewed and are negative. Historical Information   History reviewed. No pertinent past medical history. History reviewed. No pertinent surgical history. Social History     Substance and Sexual Activity   Alcohol Use Not Currently     Social History     Substance and Sexual Activity   Drug Use Not Currently     Social History     Tobacco Use   Smoking Status Never   Smokeless Tobacco Never     Family History: History reviewed. No pertinent family history.     Meds/Allergies   all current active meds have been reviewed and current meds:   Current Facility-Administered Medications   Medication Dose Route Frequency   • acetaminophen (TYLENOL) tablet 975 mg  975 mg Oral Q6H PRN   • albuterol (PROVENTIL HFA,VENTOLIN HFA) inhaler 2 puff  2 puff Inhalation Q6H PRN   • allopurinol (ZYLOPRIM) tablet 150 mg  150 mg Oral Daily   • ceftriaxone (ROCEPHIN) 1 g/50 mL in dextrose IVPB  1,000 mg Intravenous Q24H   • clopidogrel (PLAVIX) tablet 75 mg  75 mg Oral Daily   • Diclofenac Sodium (VOLTAREN) 1 % topical gel 2 g  2 g Topical 4x Daily   • gabapentin (NEURONTIN) capsule 300 mg  300 mg Oral BID   • heparin (porcine) subcutaneous injection 5,000 Units  5,000 Units Subcutaneous Q8H [facility]   • isosorbide mononitrate (IMDUR) 24 hr tablet 30 mg  30 mg Oral Daily   • levothyroxine tablet 112 mcg  112 mcg Oral Daily   • metoprolol tartrate (LOPRESSOR) tablet 25 mg  25 mg Oral Q12H [address]   • ondansetron (ZOFRAN) injection 4 mg  4 mg Intravenous Q6H PRN   • predniSONE tablet 5 mg  5 mg Oral Daily   • sertraline (ZOLOFT) tablet 25 mg  25 mg Oral Daily   • torsemide (DEMADEX) tablet 20 mg  20 mg Oral BID          Not on File    Objective   Vitals: Blood pressure 109/60, pulse 63, temperature (!) 97.4 °F (36.3 °C), resp. rate 18, height 4' 11" (1.499 m), weight 73 kg (160 lb 15 oz), SpO2 97 %. ,     Body mass index is 32.51 kg/m². ,     Systolic (57GPP), SWB:738 , Min:101 , NBB:092     Diastolic (15RLH), ZWT:68, Min:53, Max:77          No intake or output data in the 24 hours ending 09/05/23 0830  Weight (last 2 days)     Date/Time Weight    09/04/23 1255 73 (160.94)        Invasive Devices     Peripheral Intravenous Line  Duration           Peripheral IV 09/04/23 Right Antecubital <1 day    Peripheral IV 09/04/23 Right;Ventral (anterior) Hand <1 day                  Physical Exam  Constitutional:       General: She is not in acute distress. Appearance: She is obese. HENT:      Head: Normocephalic. Mouth/Throat:      Mouth: Mucous membranes are moist.   Cardiovascular:      Rate and Rhythm: Normal rate and regular rhythm. Pulses: Normal pulses. Pulmonary:      Effort: Pulmonary effort is normal. No respiratory distress. Breath sounds: Normal breath sounds. Abdominal:      General: Bowel sounds are normal.      Palpations: Abdomen is soft. Musculoskeletal:         General: No swelling. Normal range of motion. Cervical back: Neck supple. Skin:     General: Skin is warm and dry. Capillary Refill: Capillary refill takes less than 2 seconds. Neurological:      Mental Status: She is alert and oriented to person, place, and time.    Psychiatric:         Mood and Affect: Mood normal. LABORATORY RESULTS:      CBC with diff:   Results from last 7 days   Lab Units 09/05/23 0527 09/04/23  1311 09/04/23  1309   WBC Thousand/uL 5.73 9.26  --    HEMOGLOBIN g/dL 10.8* 10.9*  --    I STAT HEMOGLOBIN g/dl  --   --  11.9   HEMATOCRIT % 33.4* 35.5  --    HEMATOCRIT, ISTAT %  --   --  35   MCV fL 107* 109*  --    PLATELETS Thousands/uL 113* 122*  --    RBC Million/uL 3.13* 3.25*  --    MCH pg 34.5* 33.5  --    MCHC g/dL 32.3 30.7*  --    RDW % 16.1* 15.9*  --    MPV fL 11.2 10.8  --    NRBC AUTO /100 WBCs 1 0  --        CMP:  Results from last 7 days   Lab Units 09/05/23 0537 09/04/23 1311 09/04/23  1309   POTASSIUM mmol/L 3.6 3.8  --    CHLORIDE mmol/L 98 99  --    CO2 mmol/L 31 23  --    CO2, I-STAT mmol/L  --   --  28   BUN mg/dL 67* 74*  --    CREATININE mg/dL 1.67* 1.64*  --    GLUCOSE, ISTAT mg/dl  --   --  220*   CALCIUM mg/dL 8.2* 8.6  --    AST U/L 17 18  --    ALT U/L 12 11  --    ALK PHOS U/L 50 52  --    EGFR ml/min/1.73sq m 26 27  --        BMP:  Results from last 7 days   Lab Units 09/05/23 0537 09/04/23 1311 09/04/23  1309   POTASSIUM mmol/L 3.6 3.8  --    CHLORIDE mmol/L 98 99  --    CO2 mmol/L 31 23  --    CO2, I-STAT mmol/L  --   --  28   BUN mg/dL 67* 74*  --    CREATININE mg/dL 1.67* 1.64*  --    GLUCOSE, ISTAT mg/dl  --   --  220*   CALCIUM mg/dL 8.2* 8.6  --           No results found for: "NTBNP"         Results from last 7 days   Lab Units 09/04/23  1311   MAGNESIUM mg/dL 2.3                 Results from last 7 days   Lab Units 09/05/23  0434   TSH 3RD GENERATON uIU/mL 0.330*       Results from last 7 days   Lab Units 09/04/23  1311   INR  0.97     Lipid Profile:   No results found for: "CHOL"  No results found for: "HDL"  No results found for: "LDLCALC"  No results found for: "TRIG"      Cardiac testing:   No results found for this or any previous visit. No results found for this or any previous visit. No valid procedures specified.   No results found for this or any previous visit. Imaging: I have personally reviewed pertinent reports. XR shoulder 2+ vw left    Result Date: 9/5/2023  Narrative: LEFT SHOULDER INDICATION:   pain. COMPARISON:  None VIEWS:  XR SHOULDER 2+ VW LEFT FINDINGS: There is no acute fracture or dislocation. Moderate glenohumeral joint osteoarthritis is present. Mild acromioclavicular joint osteoarthritis is present. No lytic or blastic osseous lesion. Soft tissues are unremarkable. Impression: No acute osseous abnormality. Degenerative changes as described. Workstation performed: [id number]     TRAUMA - CT spine cervical wo contrast    Result Date: 9/4/2023  Narrative: CT CERVICAL SPINE - WITHOUT CONTRAST INDICATION:   TRAUMA. COMPARISON: Prior CT cervical spine dated 2/9/2023 and 5/19/2022 from different patient jacket TECHNIQUE:  CT examination of the cervical spine was performed without intravenous contrast.  Contiguous axial images were obtained. Multiplanar 2D reformatted images were created from the source data. Radiation dose length product (DLP) for this visit:  345 mGy-cm . This examination, like all CT scans performed in the [facility], was performed utilizing techniques to minimize radiation dose exposure, including the use of iterative reconstruction and automated exposure control. IMAGE QUALITY:  Diagnostic. FINDINGS: ALIGNMENT: Patient is status post posterior fusion of C1 and C2 with prior displaced fracture deformity of C2. There is increased lordosis at C2-C3 with multilevel degenerative changes and fused anterior osteophytes at C3-T1 level. VERTEBRAE again seen is a mildly displaced fracture of the left lateral mass of the C2 vertebral body (series 5 image 43), stable in alignment compared to 5/19/2022 DEGENERATIVE CHANGES:  Moderate multilevel cervical degenerative changes are noted.  There is mild to moderate spinal canal stenosis and likely neuroforaminal narrowing at C2-C3 level PREVERTEBRAL AND PARASPINAL SOFT TISSUES: Unremarkable THORACIC INLET: A 1 mm right apical pulmonary nodule (5/124 and 2 mm left apical pulmonary nodule (5/129. Secretions in the upper thoracic esophagus. .     Impression: No new cervical spine fracture or traumatic malalignment. Stable chronic mildly displaced fracture of the left lateral mass of C2 vertebral body, compared to 5/19/2022. Posterior fusion at C1-C2 level with moderate degenerative changes and mild to moderate spinal canal stenosis and likely neuroforaminal narrowing at C2-C3 level Small biapical pulmonary nodules measuring up to 2 mm. Based on current Fleischner Society 2017 Guidelines on incidental pulmonary nodule, no routine follow-up is needed if the patient is low risk. If the patient is high risk, optional follow-up chest CT at 12 months can be considered. I personally discussed this study with Sky Brady on 9/4/2023 2:35 PM. Workstation performed: [id number]     TRAUMA - CT head wo contrast    Result Date: 9/4/2023  Narrative: CT BRAIN - WITHOUT CONTRAST INDICATION:   TRAUMA. COMPARISON: CT head 2/9/2023 from different patient jacket TECHNIQUE:  CT examination of the brain was performed. Multiplanar 2D reformatted images were created from the source data. Radiation dose length product (DLP) for this visit:  895.44 mGy-cm . This examination, like all CT scans performed in the [facility], was performed utilizing techniques to minimize radiation dose exposure, including the use of iterative  reconstruction and automated exposure control. IMAGE QUALITY:  Diagnostic. FINDINGS: PARENCHYMA:  No intracranial mass, mass effect or midline shift. Mild volume loss. Chronic infarction involving the right lentiform nucleus. There are moderate to severe periventricular and subcortical patchy areas of low attenuation, which are nonspecific but statistically likely represent chronic microvascular ischemic changes. No CT signs of acute infarction.   No acute parenchymal hemorrhage. Atherosclerotic vascular calcifications in carotid and vertebral arteries are more advanced than would be expected for the patient's age. VENTRICLES AND EXTRA-AXIAL SPACES:  Normal for the patient's age. A 0.4 cm osseous lesion along the left frontal convexity (series 3 image 24), likely representing a calcified meningioma, which is stable since 2/9/2023. VISUALIZED ORBITS: Bilateral globes are aphakic. Darylene Pipe PARANASAL SINUSES: Normal visualized paranasal sinuses. CALVARIUM AND EXTRACRANIAL SOFT TISSUES:  Normal.     Impression: No acute intracranial abnormality. Stable chronic microvascular ischemic changes and lacunar infarcts as described above. I personally discussed this study with Madhuri Chisholm on 9/4/2023 2:35 PM. Workstation performed: [id number]     CT chest abdomen pelvis w contrast    Result Date: 9/4/2023  Narrative: CT CHEST, ABDOMEN AND PELVIS WITH IV CONTRAST INDICATION:   Trauma. COMPARISON: CT abdomen/pelvis 2/10/2023 from different patient jacket. TECHNIQUE: CT examination of the chest, abdomen and pelvis was performed. Multiplanar 2D reformatted images were created from the source data. 3D reconstructions of the bony thorax were performed in order to improve sensitivity of evaluation for rib fractures. This examination, like all CT scans performed in the [facility], was performed utilizing techniques to minimize radiation dose exposure, including the use of iterative reconstruction and automated exposure control. Radiation dose length product (DLP) for this visit:  926.78 mGy-cm IV Contrast:  100 mL of iohexol (OMNIPAQUE) Enteric Contrast: Enteric contrast was administered. FINDINGS: Suboptimal examination due to motion artifact. CHEST LUNGS: Bibasilar subsegmental atelectasis. . There are 2 small peripheral 2 mm right upper lobe pulmonary nodules on series 13 image 34 and 35.  Smaller biapical pulmonary nodules are better visualized on CT cervical spine performed on the same day. There is no tracheal or endobronchial lesion. PLEURA: A small stable simple right pleural effusion. Kenrick Lute HEART/GREAT VESSELS: Heavy atherosclerotic coronary artery calcification is noted. Heart is otherwise unremarkable. No thoracic aortic aneurysm. MEDIASTINUM AND SADIQ:  Unremarkable. CHEST WALL AND LOWER NECK:  Unremarkable. ABDOMEN LIVER/BILIARY TREE:  Unremarkable. Stable focal dilation of the distal left intrahepatic portal vein GALLBLADDER:  No calcified gallstones. No pericholecystic inflammatory change. SPLEEN:  Unremarkable. PANCREAS: There is a 1.1 cm hyperenhancing pancreatic body lesion (series 9 image 123). It is not as hyperenhancing on recent prior CT examination 2/10/2023, however the tissue prominence is also retrospectively visualized on the comparison examination. ADRENAL GLANDS:  Unremarkable. KIDNEYS/URETERS: Mild atrophy of bilateral kidneys. No hydronephrosis. STOMACH AND BOWEL:  There is colonic diverticulosis without evidence of acute diverticulitis. APPENDIX:  A normal appendix was visualized. ABDOMINOPELVIC CAVITY:  No ascites. No pneumoperitoneum. No lymphadenopathy. VESSELS:  Atherosclerotic changes are present. 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 Kenrick Lute PELVIS REPRODUCTIVE ORGANS:  Surgical changes of prior hysterectomy. URINARY BLADDER:  Unremarkable. ABDOMINAL WALL/INGUINAL REGIONS:  Unremarkable. OSSEOUS STRUCTURES:  No acute fracture or destructive osseous lesion. Stable chronic compression fracture deformities of T12, L1, L4 and L5 vertebral bodies with height loss compared to 2/10/2023. Stable grade 1 anterolisthesis of L5 over S1. Old fracture deformity of left superior pubic ramus. Impression: No acute traumatic injury of the chest, abdomen and pelvis. Apparent hyperenhancing 1.1 cm pancreatic body lesion, with soft tissue prominence unchanged compared to 10/8/2012 with new true or pseudo hyperenhancement. Findings can represent either an artifact or presence of a pancreatic neoplasm with low malignant  potential such as neuroendocrine tumor. MRI abdomen with and without contrast is recommended for further assessment. Small bilateral pulmonary nodules. based on current Fleischner Society 2017 Guidelines on incidental pulmonary nodule, no routine follow-up is needed if the patient is low risk. If the patient is high risk, optional follow-up chest CT at 12 months can be considered. 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 I personally discussed this study with Zita eBnnett on 9/4/2023 2:35 PM. Workstation performed: [id number]     XR Trauma multiple (SLB/[facility]RA trauma bay ONLY)    Result Date: 9/4/2023  Narrative: TRAUMA SERIES INDICATION:  TRAUMA. COMPARISON:  None VIEWS:  XR TRAUMA MULTIPLE FINDINGS: CHEST: Supine frontal view of the chest is obtained. Sternotomy wires. Cardiomediastinal silhouette is within normal limits accounting for technique and patient positioning. A small right pleural effusion and bibasilar subsegmental atelectasis. Franco Lennox No pneumothorax is seen on this supine film. Upright images are more sensitive to detect anterior pneumothoraces if relevant. No displaced fractures. Degenerative changes of bilateral acromioclavicular joint. Impression: No acute cardiopulmonary disease within limitations of supine imaging. Workstation performed: [id number]           Thank you for allowing us to participate in this patient's care    Counseling / Coordination of Care  Total floor / unit time spent today 45 minutes. Greater than 50% of total time was spent with the patient and / or family counseling and / or coordination of care. A description of the counseling / coordination of care: Review of history, current assessment, development of a plan.       Code Status: Level 1 - Full Code    ** Please Note: Dragon 360 Dictation voice to text software may have been used in the creation of this document.  **

## 2023-09-05 NOTE — PLAN OF CARE
Problem: Prexisting or High Potential for Compromised Skin Integrity  Goal: Skin integrity is maintained or improved  Description: INTERVENTIONS:  - Identify patients at risk for skin breakdown  - Assess and monitor skin integrity  - Assess and monitor nutrition and hydration status  - Monitor labs   - Assess for incontinence   - Turn and reposition patient  - Assist with mobility/ambulation  - Relieve pressure over bony prominences  - Avoid friction and shearing  - Provide appropriate hygiene as needed including keeping skin clean and dry  - Evaluate need for skin moisturizer/barrier cream  - Collaborate with interdisciplinary team   - Patient/family teaching  - Consider wound care consult   Outcome: Progressing     Problem: MOBILITY - ADULT  Goal: Maintain or return to baseline ADL function  Description: INTERVENTIONS:  -  Assess patient's ability to carry out ADLs; assess patient's baseline for ADL function and identify physical deficits which impact ability to perform ADLs (bathing, care of mouth/teeth, toileting, grooming, dressing, etc.)  - Assess/evaluate cause of self-care deficits   - Assess range of motion  - Assess patient's mobility; develop plan if impaired  - Assess patient's need for assistive devices and provide as appropriate  - Encourage maximum independence but intervene and supervise when necessary  - Involve family in performance of ADLs  - Assess for home care needs following discharge   - Consider OT consult to assist with ADL evaluation and planning for discharge  - Provide patient education as appropriate  Outcome: Progressing  Goal: Maintains/Returns to pre admission functional level  Description: INTERVENTIONS:  - Perform BMAT or MOVE assessment daily.   - Set and communicate daily mobility goal to care team and patient/family/caregiver.    - Collaborate with rehabilitation services on mobility goals if consulted  - Ambulate patient 3 times a day  - Out of bed for toileting  - Record patient progress and toleration of activity level   Outcome: Progressing     Problem: CARDIOVASCULAR - ADULT  Goal: Maintains optimal cardiac output and hemodynamic stability  Description: INTERVENTIONS:  - Monitor I/O, vital signs and rhythm  - Monitor for S/S and trends of decreased cardiac output  - Administer and titrate ordered vasoactive medications to optimize hemodynamic stability  - Assess quality of pulses, skin color and temperature  - Assess for signs of decreased coronary artery perfusion  - Instruct patient to report change in severity of symptoms  Outcome: Progressing  Goal: Absence of cardiac dysrhythmias or at baseline rhythm  Description: INTERVENTIONS:  - Continuous cardiac monitoring, vital signs, obtain 12 lead EKG if ordered  - Administer antiarrhythmic and heart rate control medications as ordered  - Monitor electrolytes and administer replacement therapy as ordered  Outcome: Progressing     Problem: GENITOURINARY - ADULT  Goal: Maintains or returns to baseline urinary function  Description: INTERVENTIONS:  - Assess urinary function  - Encourage oral fluids to ensure adequate hydration if ordered  - Administer IV fluids as ordered to ensure adequate hydration  - Administer ordered medications as needed  - Offer frequent toileting  - Follow urinary retention protocol if ordered  Outcome: Progressing  Goal: Absence of urinary retention  Description: INTERVENTIONS:  - Assess patient’s ability to void and empty bladder  - Monitor I/O  - Bladder scan as needed  - Discuss with physician/AP medications to alleviate retention as needed  - Discuss catheterization for long term situations as appropriate  Outcome: Progressing  Goal: Urinary catheter remains patent  Description: INTERVENTIONS:  - Assess patency of urinary catheter  - If patient has a chronic coelho, consider changing catheter if non-functioning  - Follow guidelines for intermittent irrigation of non-functioning urinary catheter  Outcome: Progressing     Problem: SKIN/TISSUE INTEGRITY - ADULT  Goal: Skin Integrity remains intact(Skin Breakdown Prevention)n     Bed Management:  -Have minimal linens on bed & keep smooth, unwrinkled  -Change linens as needed when moist or perspiring      -Use appropriate equipment to lift or move patient in bed   Outcome: ProgressingGoal: Incision(s), wounds(s) or drain site(s) healing without S/S of infection  Description: INTERVENTIONS  - Assess and document dressing, incision, wound bed, drain sites and surrounding tissue  - Provide patient and family education    Goal: Pressure injury heals and does not worsen  Description: Interventions:  - Implement low air loss mattress or specialty surface (Criteria met)  - Apply silicone foam dressing  Outcome: Progressing     Problem: PAIN - ADULT  Goal: Verbalizes/displays adequate comfort level or baseline comfort level  Description: Interventions:  - Encourage patient to monitor pain and request assistance  - Assess pain using appropriate pain scale  - Administer analgesics based on type and severity of pain and evaluate response  - Implement non-pharmacological measures as appropriate and evaluate response  - Consider cultural and social influences on pain and pain management  - Notify physician/advanced practitioner if interventions unsuccessful or patient reports new pain  Outcome: Progressing

## 2023-09-05 NOTE — ASSESSMENT & PLAN NOTE
· Patient does acknowledge symptoms of urinary frequency/burning with mild suprapubic discomfort. However granddaughter reports that she has a history of urinary tract infection and urinary incontinence. Patient presents with low-grade temperature. · Continue with empiric antibiotic therapy pending final culture results. Unfortunately UA and cultures were obtained after patient receiving 1 dose of ceftriaxone. · Monitor temperature curve  · Follow-up on blood cultures.

## 2023-09-05 NOTE — PHYSICAL THERAPY NOTE
Physical Therapy Evaluation    Patient Name: Holley Osuna    Today's Date: 9/5/2023     Problem List  Principal Problem:    Febrile illness  Active Problems:    Near syncope    Rheumatoid arthritis ([address])    Diabetes ([address])    History of renal artery stenosis    Family history of gout    Peripheral polyneuropathy    Hypertension    Lupus ([address])    Hyperlipidemia    Stroke ([address])    Fall    Pressure injury of deep tissue       Past Medical History  History reviewed. No pertinent past medical history. Past Surgical History  History reviewed. No pertinent surgical history. 09/05/23 0937   PT Last Visit   PT Visit Date 09/05/23   Note Type   Note type Evaluation   Pain Assessment   Pain Assessment Tool FLACC   Pain Location/Orientation Orientation: Left; Location: Shoulder   Effect of Pain on Daily Activities to touch and with weight bearing  (x-ray negative)   Pain Rating: FLACC (Rest) - Face 0   Pain Rating: FLACC (Rest) - Legs 0   Pain Rating: FLACC (Rest) - Activity 0   Pain Rating: FLACC (Rest) - Cry 0   Pain Rating: FLACC (Rest) - Consolability 0   Score: FLACC (Rest) 0   Pain Rating: FLACC (Activity) - Face 1   Pain Rating: FLACC (Activity) - Legs 0   Pain Rating: FLACC (Activity) - Activity 0   Pain Rating: FLACC (Activity) - Cry 1   Pain Rating: FLACC (Activity) - Consolability 1   Score: FLACC (Activity) 3   Restrictions/Precautions   Weight Bearing Precautions Per Order No   Braces or Orthoses (S)  C/S Collar  (active orders; c/s cleared via xray with no acute injuries. chronic c/s fx which per last neurosx note 4/2023-states pt can start to wean. RN to check if collar can be discontinued. 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), incontinent)   Home Living   Type of Home House  (in law suite attached to daughters house)   Home Layout One level;Ramped entrance   Bathroom Shower/Tub Walk-in shower   Bathroom Toilet Raised   [address] bars in shower; Shower chair;Grab bars around toilet   Home Equipment Walker;Cane  (RW use PTA; STS recliner)   Additional Comments sleeps in STS recliner at home   Prior Function   Level of [place] Needs assistance with IADLS;Needs assistance with functional mobility; Independent with ADLs   Lives With Family  (daughter, QUANG, grand daughter)   Joanie Job Help From Family   IADLs Family/Friend/Other provides meals; Family/Friend/Other provides medication management; Family/Friend/Other provides transportation   Falls in the last 6 months 1 to 4  (2)   Comments daughter states someone is always home and within range to hear if pt needs help   General   Family/Caregiver Present Yes  (daughter)   Cognition   Overall Cognitive Status WFL   Arousal/Participation Cooperative   Orientation Level Oriented X4   Following Commands Follows one step commands without difficulty   Comments overall pleasant to work with. appears resistant to medical professional suggestions   LUE Assessment   LUE Assessment   (+TTP (bicep region))   RLE Assessment   RLE Assessment   (4/5 grossly)   LLE Assessment   LLE Assessment   (4/5 grossly)   Bed Mobility   Supine to Sit 3  Moderate assistance   Additional items Assist x 2;HOB elevated; Increased time required;LE management;Verbal cues   Sit to Supine 2  Maximal assistance   Additional items Assist x 2; Increased time required;Verbal cues;LE management   Transfers   Sit to Stand 3  Moderate assistance   Additional items Assist x 1; Increased time required;Verbal cues   Stand to Sit 3  Moderate assistance   Additional items Assist x 1; Increased time required;Verbal cues   Toilet transfer 3  Moderate assistance   Additional items Assist x 1; Increased time required;Verbal cues;Standard toilet   Additional Comments c KIERA Ambulation/Elevation   Gait pattern Improper Weight shift;Decreased foot clearance; Short stride; Step to;Excessively slow   Gait Assistance 4  Minimal assist   Additional items Assist x 1;Verbal cues   Assistive Device Rolling walker   Distance 5'x2   Ambulation/Elevation Additional Comments very slow gait speed   Balance   Static Sitting Poor +   Dynamic Sitting Poor +   Static Standing Poor +   Dynamic Standing Poor   Ambulatory Poor   Endurance Deficit   Endurance Deficit Yes   Endurance Deficit Description fatigue, weakness   Activity Tolerance   Activity Tolerance Patient limited by fatigue   Medical Staff Made Aware OT JULIANN   Nurse Made Aware yes-cleared   Assessment   Prognosis Fair   Problem List Decreased strength;Decreased endurance; Impaired balance;Decreased mobility; Decreased coordination;Decreased range of motion;Decreased skin integrity;Obesity;Pain   Assessment Pt is a 80 y.o. female admitted to [facility] on 9/4/2023 s/p fall at home. Pt received a primary medical dx of febrile illness. Pt has the following comorbidities which affect their treatment: active problems including near syncope, stroke, HTN, peripheral polyneuropathy, family history of gout, h/o renal artery stenosis, DTI posterior labia, diabetes, as well as personal factors including being alone in inlaw suite and relying on caregivers. Pt has a high complexity clinical presentation due to Ongoing medical management for primary dx, Increased reliance on more restrictive AD compared to baseline, Decreased activity tolerance compared to baseline, Fall risk, Increased assistance needed from caregiver at current time, Ongoing telemetry monitoring, Trending lab values, Diagnostic imaging pending, Continuous pulse oximetry monitoring  , and PMH. PT was consulted to evaluate pt's functional mobility and discharge needs. Upon evaluation, patient required mod-maxAx2 for bed mobility, modAx1 for STS transfers, minAx1 for ambulation.  Pt's functional impairments include: impaired strength, balance, endurance, L shoulder ROM and strength due to pain, mobility, and activity tolerance. At conclusion of eval, pt remained supine in bed with bed alarm, phone, call bell, and all other personal needs within reach. Pt would benefit from skilled PT to address their functional mobility limitations. The patient's AM-PAC Basic Mobility Inpatient Short Form Raw Score is 11. A Raw score of less than or equal to 16 suggests the patient may benefit from discharge to post-acute rehabilitation services. Please also refer to the recommendation of the Physical Therapist for safe discharge planning. D/C recommendations are rehab as pt is functioning below her baseline per daughter. However, if family would like to take pt home and provide appropriate care, recommend HHPT and equipment listed below. Barriers to Discharge Decreased caregiver support; Inaccessible home environment   Goals   Patient Goals to go home   STG Expiration Date 09/19/23   PT Treatment Day 0   Plan   Treatment/Interventions Functional transfer training;LE strengthening/ROM; Therapeutic exercise; Endurance training;Cognitive reorientation;Patient/family training;Equipment eval/education; Bed mobility;Gait training;Spoke to nursing;Spoke to case management;OT;Family;Spoke to advanced practitioner   PT Frequency 2-3x/wk   Recommendation   PT Discharge Recommendation Post acute rehabilitation services  (if family and pt desire home DC, recommend HHPT)   Equipment Recommended Walker; Wheelchair   Wheelchair Package Recommended Standard   Walker Package Recommended Wheeled walker   AM-PAC Basic Mobility Inpatient   Turning in [facility] Without Bedrails 2   Lying on Back to Sitting on Edge of Flat Bed Without Bedrails 1   Moving Bed to Chair 3   Standing Up From Chair Using Arms 2   Walk in Room 2   Climb 3-5 Stairs With Railing 1   Basic Mobility Inpatient Raw Score 11   Basic Mobility Standardized Score 30.25   Highest Level Of Mobility   JH-HLM Goal 4: Move to chair/commode   JH-HLM Achieved 5: Stand (1 or more minutes)   Modified Mireya Scale   Modified Mireay Scale 4   Jorge Acevedo PT, DPT

## 2023-09-05 NOTE — ASSESSMENT & PLAN NOTE
· Evaluated by trauma team.  Further evaluation, no reports of acute fractures were found. · PT OT evaluation. · Check orthostatic blood pressure.

## 2023-09-05 NOTE — PLAN OF CARE
Problem: OCCUPATIONAL THERAPY ADULT  Goal: Performs self-care activities at highest level of function for planned discharge setting. See evaluation for individualized goals. Description: Treatment Interventions: ADL retraining, Functional transfer training, UE strengthening/ROM, Endurance training, Cognitive reorientation, Patient/family training, Equipment evaluation/education, Compensatory technique education, Energy conservation          See flowsheet documentation for full assessment, interventions and recommendations. Note: Limitation: Decreased ADL status, Decreased Safe judgement during ADL, Decreased cognition, Decreased self-care trans, Decreased high-level ADLs, Decreased endurance, Decreased UE strength, Decreased UE ROM  Prognosis: Fair  Assessment: Pt is a 80 y.o. female who was admitted to [address] [place] [address] on 9/4/2023 with mild confusion past several days, urinary frequency/burning and now here with  Febrile illness, near syncope, history of stroke, HTN, peripheral polyneuropathy, diabetes, RA . Patient  has no past medical history on file. At baseline pt was completing I with ADL's, assistance from family with IaDL's, +RW with mobility tasks. Pt lives in an inlaw suite with [facility] and first floor set-up. Currently pt requires min a UB, mod/max a LB for overall ADLS and min/mod a with RW for functional mobility/transfers. Pt currently presents with impairments in the following categories -difficulty performing ADLS, difficulty performing IADLS , limited insight into deficits and compliance activity tolerance, endurance, standing balance/tolerance, sitting balance/tolerance, UE strength, FMC, GMC, memory, insight, safety  and judgement .  These impairments, as well as pt's fatigue, decreased caregiver support and risk for falls  limit pt's ability to safely engage in all baseline areas of occupation, includinggrooming, bathing, dressing, toileting, functional mobility/transfers, community mobility, laundry , house maintenance, medication management, meal prep, social participation  and leisure activities  From OT standpoint, recommend STR upon D/C. The patient's raw score on the AM-PAC Daily Activity Inpatient Short Form is 14. A raw score of less than 19 suggests the patient may benefit from discharge to post-acute rehabilitation services. Please refer to the recommendation of the Occupational Therapist for safe discharge planning. OT will continue to follow to address the below stated goals.      OT Discharge Recommendation: Post acute rehabilitation services (if pt/family declines recommendation home OT)

## 2023-09-05 NOTE — ASSESSMENT & PLAN NOTE
Patient reported recurrent episodes of near syncope. She has had multiple falls previously as per the granddaughter. Suspect secondary to UTI. Management as above. · orthostatic blood pressure checks  · Telemetry monitoring  · Appreciate cardiology recommendations. · PT/OT  · Echocardiogram regarding aortic valve. Patient with history of bioprosthetic valve. · Reduced dosing of Neurontin to twice daily from 3 times daily. Hold Robaxin. Continue with supportive care.

## 2023-09-05 NOTE — PHYSICAL THERAPY NOTE
Physical Therapy Evaluation    Patient Name: Lupe Iyer    Today's Date: 9/5/2023     Problem List  Principal Problem:    Febrile illness  Active Problems:    Near syncope    Rheumatoid arthritis ([address])    Diabetes ([address])    History of renal artery stenosis    Family history of gout    Peripheral polyneuropathy    Hypertension    Lupus ([address])    Hyperlipidemia    Stroke ([address])    Fall    Pressure injury of deep tissue       Past Medical History  History reviewed. No pertinent past medical history. Past Surgical History  History reviewed. No pertinent surgical history. 09/05/23 0937   PT Last Visit   PT Visit Date 09/05/23   Note Type   Note type Evaluation   Pain Assessment   Pain Assessment Tool FLACC   Pain Location/Orientation Orientation: Left; Location: Shoulder   Effect of Pain on Daily Activities to touch and with weight bearing  (x-ray negative)   Pain Rating: FLACC (Rest) - Face 0   Pain Rating: FLACC (Rest) - Legs 0   Pain Rating: FLACC (Rest) - Activity 0   Pain Rating: FLACC (Rest) - Cry 0   Pain Rating: FLACC (Rest) - Consolability 0   Score: FLACC (Rest) 0   Pain Rating: FLACC (Activity) - Face 1   Pain Rating: FLACC (Activity) - Legs 0   Pain Rating: FLACC (Activity) - Activity 0   Pain Rating: FLACC (Activity) - Cry 1   Pain Rating: FLACC (Activity) - Consolability 1   Score: FLACC (Activity) 3   Restrictions/Precautions   Weight Bearing Precautions Per Order No   Braces or Orthoses (S)  C/S Collar  (active orders; c/s cleared via xray with no acute injuries. chronic c/s fx which per last neurosx note 4/2023-states pt can start to wean. RN to check if collar can be discontinued. 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), incontinent)   Home Living   Type of Home House  (in law suite attached to daughters house)   Home Layout One level;Ramped entrance   Bathroom Shower/Tub Walk-in shower   Bathroom Toilet Raised   [address] bars in shower; Shower chair;Grab bars around toilet   Home Equipment Walker;Cane  (RW use PTA; STS recliner)   Additional Comments sleeps in STS recliner at home   Prior Function   Level of [place] Needs assistance with IADLS;Needs assistance with functional mobility; Independent with ADLs   Lives With Family  (daughter, QUANG, grand daughter)   Esteban Dietz Help From Family   IADLs Family/Friend/Other provides meals; Family/Friend/Other provides medication management; Family/Friend/Other provides transportation   Falls in the last 6 months 1 to 4  (2)   Comments daughter states someone is always home and within range to hear if pt needs help   General   Family/Caregiver Present Yes  (daughter)   Cognition   Overall Cognitive Status WFL   Arousal/Participation Cooperative   Orientation Level Oriented X4   Following Commands Follows one step commands without difficulty   Comments overall pleasant to work with. appears resistant to medical professional suggestions   LUE Assessment   LUE Assessment   (+TTP (bicep region))   RLE Assessment   RLE Assessment   (4/5 grossly)   LLE Assessment   LLE Assessment   (4/5 grossly)   Bed Mobility   Supine to Sit 3  Moderate assistance   Additional items Assist x 2;HOB elevated; Increased time required;LE management;Verbal cues   Sit to Supine 2  Maximal assistance   Additional items Assist x 2; Increased time required;Verbal cues;LE management   Transfers   Sit to Stand 3  Moderate assistance   Additional items Assist x 1; Increased time required;Verbal cues   Stand to Sit 3  Moderate assistance   Additional items Assist x 1; Increased time required;Verbal cues   Toilet transfer 3  Moderate assistance   Additional items Assist x 1; Increased time required;Verbal cues;Standard toilet   Additional Comments c KIERA Ambulation/Elevation   Gait pattern Improper Weight shift;Decreased foot clearance; Short stride; Step to;Excessively slow   Gait Assistance 4  Minimal assist   Additional items Assist x 1;Verbal cues   Assistive Device Rolling walker   Distance 5'x2   Ambulation/Elevation Additional Comments very slow gait speed   Balance   Static Sitting Poor +   Dynamic Sitting Poor +   Static Standing Poor +   Dynamic Standing Poor   Ambulatory Poor   Endurance Deficit   Endurance Deficit Yes   Endurance Deficit Description fatigue, weakness   Activity Tolerance   Activity Tolerance Patient limited by fatigue   Medical Staff Made Aware OT JULIANN   Nurse Made Aware yes-cleared   Assessment   Prognosis Fair   Problem List Decreased strength;Decreased endurance; Impaired balance;Decreased mobility; Decreased coordination;Decreased range of motion;Decreased skin integrity;Obesity;Pain   Assessment Pt is a 80 y.o. female admitted to [facility] on 9/4/2023 s/p fall at home. Pt received a primary medical dx of febrile illness. Pt has the following comorbidities which affect their treatment: active problems including near syncope, stroke, HTN, peripheral polyneuropathy, family history of gout, h/o renal artery stenosis, diabetes, as well as personal factors including being alone in inlaw suite and relying on caregivers. Pt has a high complexity clinical presentation due to Ongoing medical management for primary dx, Increased reliance on more restrictive AD compared to baseline, Decreased activity tolerance compared to baseline, Fall risk, Increased assistance needed from caregiver at current time, Ongoing telemetry monitoring, Trending lab values, Diagnostic imaging pending, Continuous pulse oximetry monitoring  , and PMH. PT was consulted to evaluate pt's functional mobility and discharge needs. Upon evaluation, patient required mod-maxAx2 for bed mobility, modAx1 for STS transfers, minAx1 for ambulation.  Pt's functional impairments include: impaired strength, balance, endurance, L shoulder ROM and strength due to pain, mobility, and activity tolerance. At conclusion of eval, pt remained supine in bed with bed alarm, phone, call bell, and all other personal needs within reach. Pt would benefit from skilled PT to address their functional mobility limitations. The patient's AM-PAC Basic Mobility Inpatient Short Form Raw Score is 11. A Raw score of less than or equal to 16 suggests the patient may benefit from discharge to post-acute rehabilitation services. Please also refer to the recommendation of the Physical Therapist for safe discharge planning. D/C recommendations are rehab as pt is functioning below her baseline per daughter. However, if family would like to take pt home and provide appropriate care, recommend HHPT and equipment listed below. Barriers to Discharge Decreased caregiver support; Inaccessible home environment   Goals   Patient Goals to go home   STG Expiration Date 09/19/23   Short Term Goal #1 In 14 days, pt will: 1) perform bed mobility with minAx1 to promote functional independence and decrease caregiver burden. 2) perform transfers to<>from all surfaces with S to promote functional independence and decrease caregiver burden. 3) ambulate 76' with S and least restrictive device to promote safe return to home. 4) be independent with weight shifting techniques to offload buttock and promote healing of DTI. 5) improve balance grades by 1/2 to promote safety with functional mobility. 6) improve strength grades by 1/2 to promote safety with functional mobility. 7) self propel w/c 200' with S to promote greater access of home and community. PT Treatment Day 0   Plan   Treatment/Interventions Functional transfer training;LE strengthening/ROM; Therapeutic exercise; Endurance training;Cognitive reorientation;Patient/family training;Equipment eval/education; Bed mobility;Gait training;Spoke to nursing;Spoke to case management;OT;Family;Spoke to advanced practitioner   PT Frequency 2-3x/wk   Recommendation   PT Discharge Recommendation Post acute rehabilitation services  (if family and pt desire home DC, recommend HHPT)   Equipment Recommended Walker; Wheelchair   Wheelchair Package Recommended Standard   Walker Package Recommended Wheeled walker   AM-PAC Basic Mobility Inpatient   Turning in [facility] Without Bedrails 2   Lying on Back to Sitting on Edge of Flat Bed Without Bedrails 1   Moving Bed to Chair 3   Standing Up From Chair Using Arms 2   Walk in Room 2   Climb 3-5 Stairs With Railing 1   Basic Mobility Inpatient Raw Score 11   Basic Mobility Standardized Score 30.25   Highest Level Of Mobility   JH-HLM Goal 4: Move to chair/commode   JH-HLM Achieved 5: Stand (1 or more minutes)   Modified Mireya Scale   Modified Mireya Scale 4   Jorge Acevedo, PT, DPT

## 2023-09-05 NOTE — PLAN OF CARE
Problem: PHYSICAL THERAPY ADULT  Goal: Performs mobility at highest level of function for planned discharge setting. See evaluation for individualized goals. Description: Treatment/Interventions: Functional transfer training, LE strengthening/ROM, Therapeutic exercise, Endurance training, Cognitive reorientation, Patient/family training, Equipment eval/education, Bed mobility, Gait training, Spoke to nursing, Spoke to case management, OT, Family, Spoke to advanced practitioner  Equipment Recommended: [place] Watkins, Wheelchair       See flowsheet documentation for full assessment, interventions and recommendations. Note: Prognosis: Fair  Problem List: Decreased strength, Decreased endurance, Impaired balance, Decreased mobility, Decreased coordination, Decreased range of motion, Decreased skin integrity, Obesity, Pain  Assessment: Pt is a 80 y.o. female admitted to [facility] on 9/4/2023 s/p fall at home. Pt received a primary medical dx of febrile illness. Pt has the following comorbidities which affect their treatment: active problems including near syncope, stroke, HTN, peripheral polyneuropathy, family history of gout, h/o renal artery stenosis, diabetes, as well as personal factors including being alone in inlaw suite and relying on caregivers. Pt has a high complexity clinical presentation due to Ongoing medical management for primary dx, Increased reliance on more restrictive AD compared to baseline, Decreased activity tolerance compared to baseline, Fall risk, Increased assistance needed from caregiver at current time, Ongoing telemetry monitoring, Trending lab values, Diagnostic imaging pending, Continuous pulse oximetry monitoring  , and PMH. PT was consulted to evaluate pt's functional mobility and discharge needs. Upon evaluation, patient required mod-maxAx2 for bed mobility, modAx1 for STS transfers, minAx1 for ambulation.  Pt's functional impairments include: impaired strength, balance, endurance, L shoulder ROM and strength due to pain, mobility, and activity tolerance. At conclusion of eval, pt remained supine in bed with bed alarm, phone, call bell, and all other personal needs within reach. Pt would benefit from skilled PT to address their functional mobility limitations. The patient's AM-PAC Basic Mobility Inpatient Short Form Raw Score is 11. A Raw score of less than or equal to 16 suggests the patient may benefit from discharge to post-acute rehabilitation services. Please also refer to the recommendation of the Physical Therapist for safe discharge planning. D/C recommendations are rehab as pt is functioning below her baseline per daughter. However, if family would like to take pt home and provide appropriate care, recommend HHPT and equipment listed below. Barriers to Discharge: Decreased caregiver support, Inaccessible home environment     PT Discharge Recommendation: Post acute rehabilitation services (if family and pt desire home DC, recommend HHPT)    See flowsheet documentation for full assessment.

## 2023-09-05 NOTE — UTILIZATION REVIEW
Initial Clinical Review    Admission: Date/Time/Statement:   Admission Orders (From admission, onward)     Ordered        09/04/23 1705  Inpatient Admission  Once                      Orders Placed This Encounter   Procedures   • Inpatient Admission     Standing Status:   Standing     Number of Occurrences:   1     Order Specific Question:   Level of Care     Answer:   Med Surg [16]     Order Specific Question:   Estimated length of stay     Answer:   More than 2 Midnights     Order Specific Question:   Certification     Answer:   I certify that inpatient services are medically necessary for this patient for a duration of greater than two midnights. See H&P and MD Progress Notes for additional information about the patient's course of treatment. ED Arrival Information     Expected   -    Arrival   9/4/2023 12:53    Acuity   Emergent            Means of arrival   Ambulance    Escorted by   [address]    Admission type   Emergency            Arrival complaint   -           Chief Complaint   Patient presents with   • Fall     Pt had a "semi witnessed" fall per ems, she was ambulating using her walker when she fell, unknown head strike, on Plavix. Was unresponsive for 1-2 minutes per family        Initial Presentation: 80 y.o. female to ED presents for Fall on chronic Plavix. Pt typically ambulates with a walker. Pt reportedly had a 1 to 2-minute loss of consciousness with minimally responsive episode as well. She was noted to be amnestic to the event. Family reports similar episodes in the past. Per Granddaughter reports that over the last 2 to 3 days she has been increasing confused with lethargy presentation. However, she also notes that she has had a history of recurrent episodes of falls. PMH for hemorrhoids, hypothyroidism, type 2 diabetes, obstructive sleep apnea, hypertension, CKD stage IV, renal artery stenosis, CHF hyperlipidemia and hyperlipidemia . HFrEF - EF 30%. Admit Inpatient level of care for Febrile illness, Near syncope. Iv antibiotics. Bld cultures pending. Tele monitoring. Cardiology consult. Echo. Reduce dosing of Neurontin to twice daily from 3 times daily. Hold Robaxin. Continue with torsemide and metoprolol therapy. Also on Imdur. Date: 9/5   Day 2:   Per Trauma; Continue Iv antibiotics for UTI. Pt continues to be absent symptoms or exam findings concerning for traumatic etiology of her fall/sycnope or sequela of her fall. Patient wears a cervical collar at home for previous cervical spine fracture, will continue here. Recommend gerontology consult for assistance in management of patient's home medications. Cardiology cons; Possible syncope - no obvious suggestion of dysrhythmia. May be secondary to orthostasis/autonomic dysfunction. Continue current meds. Await Echo. Consider Zio monitor at d/c. Progress notes; Acute cystitis. Pancreatic lesion. Bld cultures pending. Continues on Iv antibiotics.        ED Triage Vitals   Temperature Pulse Respirations Blood Pressure SpO2   09/04/23 1255 09/04/23 1255 09/04/23 1255 09/04/23 1255 09/04/23 1255   100.4 °F (38 °C) 78 17 149/68 94 %      Temp Source Heart Rate Source Patient Position - Orthostatic VS BP Location FiO2 (%)   09/04/23 1255 09/04/23 1310 09/04/23 1931 09/04/23 1931 --   Tympanic Monitor Lying Right arm       Pain Score       09/04/23 1301       1          Wt Readings from Last 1 Encounters:   09/05/23 72.6 kg (160 lb)     Additional Vital Signs:   09/05/23 0933 -- 63 -- 109/60 -- -- -- --   09/05/23 07:10:40 97.4 °F (36.3 °C)   Abnormal  63 -- 109/60 76 97 % -- --   09/05/23 0512 -- -- -- -- -- -- None (Room air) --   09/05/23 04:16:13 -- 67 -- 101/65 77 97 % -- --   09/04/23 1931 99 °F (37.2 °C) 94 18 127/75 -- 93 % None (Room air) Lying   09/04/23 1600 -- 65 18 133/60 -- 98 % -- --   09/04/23 1500 -- 66 19 128/60 -- 96 % -- --   09/04/23 1430 -- 80 18 137/60 -- 98 % -- --   09/04/23 1400 -- 70 18 114/53 -- 98 % None (Room air) --     Pertinent Labs/Diagnostic Test Results:   XR shoulder 2+ vw left   Final Result by Dylon Amaya MD (09/05 2020)      No acute osseous abnormality. Degenerative changes as described. Workstation performed: [id number]         TRAUMA - CT head wo contrast   Final Result by Marty Mckeon MD (09/04 1437)      No acute intracranial abnormality. Stable chronic microvascular ischemic changes and lacunar infarcts as described above. I personally discussed this study with Maritza Welch on 9/4/2023 2:35 PM.                     Workstation performed: [id number]         TRAUMA - CT spine cervical wo contrast   Final Result by Marty Mckeon MD (09/04 1437)      No new cervical spine fracture or traumatic malalignment. Stable chronic mildly displaced fracture of the left lateral mass of C2 vertebral body, compared to 5/19/2022. Posterior fusion at C1-C2 level with moderate degenerative changes and mild to    moderate spinal canal stenosis and likely neuroforaminal narrowing at C2-C3 level      Small biapical pulmonary nodules measuring up to 2 mm. Based on current Fleischner Society 2017 Guidelines on incidental pulmonary nodule, no routine follow-up is needed if the patient is low risk. If the patient is high risk, optional follow-up chest    CT at 12 months can be considered. I personally discussed this study with Maritza Welch on 9/4/2023 2:35 PM.                     Workstation performed: [id number]         CT chest abdomen pelvis w contrast   Final Result by Marty Mckeon MD (09/04 1436)      No acute traumatic injury of the chest, abdomen and pelvis. Apparent hyperenhancing 1.1 cm pancreatic body lesion, with soft tissue prominence unchanged compared to 10/8/2012 with new true or pseudo hyperenhancement.  Findings can represent either an artifact or presence of a pancreatic neoplasm with low malignant    potential such as neuroendocrine tumor. MRI abdomen with and without contrast is recommended for further assessment. Small bilateral pulmonary nodules. based on current Fleischner Society 2017 Guidelines on incidental pulmonary nodule, no routine follow-up is needed if the patient is low risk. If the patient is high risk, optional follow-up chest CT at 12 months can    be considered. 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 I personally discussed this study with Blake Sylvester on 9/4/2023 2:35 PM.            Workstation performed: [id number]         XR Trauma multiple (SLB/SLRA trauma bay ONLY)   Final Result by Royce Guido MD (09/04 1429)      No acute cardiopulmonary disease within limitations of supine imaging.                Workstation performed: [id number]         XR chest 1 view    (Results Pending)     Results from last 7 days   Lab Units 09/05/23  1000   SARS-COV-2  Negative     Results from last 7 days   Lab Units 09/05/23  0527 09/04/23  1311 09/04/23  1309   WBC Thousand/uL 5.73 9.26  --    HEMOGLOBIN g/dL 10.8* 10.9*  --    I STAT HEMOGLOBIN g/dl  --   --  11.9   HEMATOCRIT % 33.4* 35.5  --    HEMATOCRIT, ISTAT %  --   --  35   PLATELETS Thousands/uL 113* 122*  --    NEUTROS ABS Thousands/µL 3.25 6.52  --          Results from last 7 days   Lab Units 09/05/23 0537 09/04/23  1311 09/04/23  1309   SODIUM mmol/L 137 135  --    POTASSIUM mmol/L 3.6 3.8  --    CHLORIDE mmol/L 98 99  --    CO2 mmol/L 31 23  --    CO2, I-STAT mmol/L  --   --  28   ANION GAP mmol/L 8 13  --    BUN mg/dL 67* 74*  --    CREATININE mg/dL 1.67* 1.64*  --    EGFR ml/min/1.73sq m 26 27  --    CALCIUM mg/dL 8.2* 8.6  --    CALCIUM, IONIZED, ISTAT mmol/L  --   --  0.96*   MAGNESIUM mg/dL  --  2.3  --    PHOSPHORUS mg/dL  --  3.7  --      Results from last 7 days   Lab Units 09/05/23 0537 09/04/23  1311   AST U/L 17 18   ALT U/L 12 11   ALK PHOS U/L 50 52   TOTAL PROTEIN g/dL 6.0* 6.5   ALBUMIN g/dL 3.3* 3.5   TOTAL BILIRUBIN mg/dL 0.33 0.50         Results from last 7 days   Lab Units 09/05/23  0537 09/04/23  1311   GLUCOSE RANDOM mg/dL 104 208*       Results from last 7 days   Lab Units 09/04/23  1309   PH, HERNANDO I-STAT  7.536*   PCO2, HERNANDO ISTAT mm HG 31.8*   PO2, HERNANDO ISTAT mm HG 49.0*   HCO3, HERNANDO ISTAT mmol/L 26.9   I STAT BASE EXC mmol/L 5*   I STAT O2 SAT % 89*         Results from last 7 days   Lab Units 09/04/23  1943 09/04/23  1825 09/04/23  1548   HS TNI 0HR ng/L  --   --  58*   HS TNI 2HR ng/L  --  65*  --    HSTNI D2 ng/L  --  7  --    HS TNI 4HR ng/L 69*  --   --    HSTNI D4 ng/L 11  --   --          Results from last 7 days   Lab Units 09/04/23  1311   PROTIME seconds 13.1   INR  0.97   PTT seconds 24     Results from last 7 days   Lab Units 09/05/23  0434   TSH 3RD GENERATON uIU/mL 0.330*     Results from last 7 days   Lab Units 09/04/23  1311   PROCALCITONIN ng/ml 0.33*     Results from last 7 days   Lab Units 09/04/23  1548 09/04/23  1311   LACTIC ACID mmol/L 1.7 3.5*       Results from last 7 days   Lab Units 09/05/23  1358   CLARITY UA  Extra Turbid   COLOR UA  Light Orange   SPEC GRAV UA  1.018   PH UA  7.0   GLUCOSE UA mg/dl Negative   KETONES UA mg/dl Negative   BLOOD UA  Moderate*   PROTEIN UA mg/dl 30 (1+)*   NITRITE UA  Negative   BILIRUBIN UA  Negative   UROBILINOGEN UA (BE) mg/dl <2.0   LEUKOCYTES UA  Large*   WBC UA /hpf Innumerable*   RBC UA /hpf 4-10*   BACTERIA UA /hpf Moderate*   EPITHELIAL CELLS WET PREP /hpf Innumerable*     Results from last 7 days   Lab Units 09/05/23  1000   INFLUENZA A PCR  Negative   INFLUENZA B PCR  Negative   RSV PCR  Negative       Results from last 7 days   Lab Units 09/04/23  1604   BLOOD CULTURE  Received in Microbiology Lab. Culture in Progress. Received in Microbiology Lab. Culture in Progress.        ED Treatment:   Medication Administration from 09/04/2023 1250 to 09/04/2023 1920       Date/Time Order Dose Route Action     09/04/2023 1325 EDT iohexol (OMNIPAQUE) 350 MG/ML injection (MULTI-DOSE) 100 mL 100 mL Intravenous Given     09/04/2023 1545 EDT sodium chloride 0.9 % bolus 250 mL 250 mL Intravenous New Bag     09/04/2023 1604 EDT ceftriaxone (ROCEPHIN) 2 g/50 mL in dextrose IVPB 2,000 mg Intravenous New Bag        History reviewed. No pertinent past medical history. Present on Admission:  • Pressure injury of deep tissue      Admitting Diagnosis: Near syncope [R55]  Fall, initial encounter [W19. XXXA]  Unspecified multiple injuries, initial encounter [T07. XXXA]  Age/Sex: 80 y.o. female     Admission Orders:  Scheduled Medications:  allopurinol, 150 mg, Oral, Daily  cefTRIAXone, 1,000 mg, Intravenous, Q24H  clopidogrel, 75 mg, Oral, Daily  Diclofenac Sodium, 2 g, Topical, 4x Daily  gabapentin, 300 mg, Oral, BID  heparin (porcine), 5,000 Units, Subcutaneous, Q8H DOMINIQUE  isosorbide mononitrate, 30 mg, Oral, Daily  [START ON 9/6/2023] levothyroxine, 100 mcg, Oral, Daily  metoprolol tartrate, 25 mg, Oral, Q12H DOMINIQUE  predniSONE, 5 mg, Oral, Daily  sertraline, 25 mg, Oral, Daily  torsemide, 20 mg, Oral, BID      Continuous IV Infusions: None     PRN Meds:  acetaminophen, 975 mg, Oral, Q6H PRN 9/4 x1, 9/5 x2  albuterol, 2 puff, Inhalation, Q6H PRN  ondansetron, 4 mg, Intravenous, Q6H PRN        IP CONSULT TO CARDIOLOGY    Network Utilization Review Department  ATTENTION: Please call with any questions or concerns to [phone] and carefully listen to the prompts so that you are directed to the right person. All voicemails are confidential.  Lisset Hernandez all requests for admission clinical reviews, approved or denied determinations and any other requests to dedicated fax number below belonging to the campus where the patient is receiving treatment.  List of dedicated fax numbers for the Facilities:  [place] NATALY (Administrative/Medical Necessity) [phone] 2303 [facility] (Maternity/NICU/Pediatrics) [address] [facility] [address] [facility] [phone] 1505 [facility] [address] [address] [facility] [address] [facility] [address] [address] [facility] [address] [phone]

## 2023-09-05 NOTE — PROGRESS NOTES
-- Patient:  -- MRN: [id number]  -- Aidin Request ID: [id number]  -- Level of care reserved: [address]  -- Partner Reserved: [facility] [address] [phone]  -- Clinical needs requested:  -- Geography searched: [phone]  -- Start of Service:  -- Request sent: 3:03pm EDT on 9/5/2023 by Shannan Corona  -- Partner reserved: 3:51pm EDT on 9/5/2023 by Shannan Corona  -- Choice list shared:

## 2023-09-05 NOTE — OCCUPATIONAL THERAPY NOTE
Occupational Therapy Evaluation     Patient Name: Gary Gracia  Today's Date: 9/5/2023  Problem List  Principal Problem:    Febrile illness  Active Problems:    Near syncope    Rheumatoid arthritis ([address])    Diabetes ([address])    History of renal artery stenosis    Family history of gout    Peripheral polyneuropathy    Hypertension    Lupus ([address])    Hyperlipidemia    Stroke ([address])    Fall    Pressure injury of deep tissue    Past Medical History  History reviewed. No pertinent past medical history. Past Surgical History  History reviewed. No pertinent surgical history. 09/05/23 0945   OT Last Visit   OT Visit Date 09/05/23   Note Type   Note type Evaluation   Pain Assessment   Pain Assessment Tool FLACC   Pain Location/Orientation Orientation: Left; Location: Shoulder   Pain Rating: FLACC (Rest) - Face 0   Pain Rating: FLACC (Rest) - Legs 0   Pain Rating: FLACC (Rest) - Activity 0   Pain Rating: FLACC (Rest) - Cry 0   Pain Rating: FLACC (Rest) - Consolability 0   Score: FLACC (Rest) 0   Pain Rating: FLACC (Activity) - Face 1   Pain Rating: FLACC (Activity) - Legs 1   Pain Rating: FLACC (Activity) - Activity 0   Pain Rating: FLACC (Activity) - Cry 1   Pain Rating: FLACC (Activity) - Consolability 1   Score: FLACC (Activity) 4   Restrictions/Precautions   Weight Bearing Precautions Per Order No   Braces or Orthoses (S)  C/S Collar   Other Precautions Cognitive; Chair Alarm; Bed Alarm;Multiple lines;Telemetry; Fall Risk;Pain;Spinal precautions  (History of Labile hematoma)   Home Living   Type of [address] [place] [address] One level;Ramped entrance  (pt lives in an In-law suite with ramped entrance from back, pt has access to daughter's home from inside suite.)   Bathroom Shower/Tub Walk-in shower   Bathroom Toilet Raised   Bathroom Equipment Grab bars in shower;Grab bars around toilet; Shower chair; Other (Comment)  (sit to stand recliner)   Home Equipment Walker;Cane   Prior Function   Level of [place] Needs assistance with IADLS;Needs assistance with functional mobility; Independent with ADLs   Lives With (Daughter, QUANG, 3 grandchildren >14 years old)   Receives Help From Family   IADLs Family/Friend/Other provides meals; Family/Friend/Other provides medication management; Family/Friend/Other provides transportation   Falls in the last 6 months 1 to 4  (two)   Vocational Retired   Lifestyle   Autonomy I with ADL's, assistance with IADL's, +RW with functional mobility +sleeps in a sit to stand recliner   Reciprocal Relationships family -gregoria, QUANG, grandchildren -pt's daughter present during evaluation and reports pt never alone someone home to assist if needed.    Service to Others retired   Intrinsic Gratification watching [address] Yes  (daughter)   ADL   Eating Assistance 7  Independent   Grooming Assistance 5  Supervision/Setup    [address] [place] [address] 4  Minimal Assistance    [address] [place] [address] 2  Maximal Assistance   [address] 4  Minimal Assistance   [address] 2  Maximal [address]  3  Moderate Assistance   Bed Mobility   Supine to Sit 3  Moderate assistance   Additional items Assist x 2   Sit to Supine 2  Maximal assistance   Additional items Assist x 2   Transfers   Sit to Stand 3  Moderate assistance   Additional items Assist x 1   Stand to Sit 3  Moderate assistance   Additional items Assist x 1   Toilet transfer 3  Moderate assistance   Additional items Assist x 1;Standard toilet   Additional Comments +RW and cues for safety   Functional Mobility   Functional Mobility 4  Minimal assistance   Additional Comments min a x 1 with RW with compliants for left UE pain (x-ray negative for acute fracture/abnormality) with short distance functional mobilty and signficant increased time   Balance   Static Sitting Fair -   Dynamic Sitting Poor +   Static Standing Poor +   Dynamic Standing Poor   Ambulatory Poor   Activity Tolerance   Activity Tolerance Patient limited by fatigue;Patient limited by pain   Medical Staff Made Aware PT selina   Nurse Made Aware RN cleared pt for therapy   RUE Assessment   RUE Assessment WFL   LUE Assessment   LUE Assessment (impaired shoulder ROM/strength 2* to bicep pain 2-/5, elbow 3+/5,  4/5 pt with diffiucult extending arm and Weight bearing through UE with RW -pt educated on HEP with encouraged gentle, slow ROM throughout to decrease pain/increase strength.)   Hand Function   Gross Motor Coordination Functional   Fine Motor Coordination Functional   Cognition   Overall Cognitive Status Impaired   Arousal/Participation Alert; Responsive; Cooperative   Attention Attends with cues to redirect   Orientation Level Oriented X4   Memory Decreased recall of precautions;Decreased recall of recent events   Following Commands Follows one step commands without difficulty   Comments pt motivated for therapy, decreased insight into deficits/abilties with decreased safety awareness/ judgement, impaired attention/distractible with occasional tangential speech. Continue to assess. Assessment   Limitation Decreased ADL status; Decreased Safe judgement during ADL;Decreased cognition;Decreased self-care trans;Decreased high-level ADLs; Decreased endurance;Decreased UE strength;Decreased UE ROM   Prognosis Fair   Assessment Pt is a 80 y.o. female who was admitted to [address] [place] [address] on 9/4/2023 with mild confusion past several days, urinary frequency/burning and now here with  Febrile illness, near syncope, history of stroke, HTN, peripheral polyneuropathy, diabetes, RA . Patient  has no past medical history on file. At baseline pt was completing I with ADL's, assistance from family with IaDL's, +RW with mobility tasks. Pt lives in an inlaw suite with [facility] and first floor set-up. Currently pt requires min a UB, mod/max a LB for overall ADLS and min/mod a with RW for functional mobility/transfers.  Pt currently presents with impairments in the following categories -difficulty performing ADLS, difficulty performing IADLS , limited insight into deficits and compliance activity tolerance, endurance, standing balance/tolerance, sitting balance/tolerance, UE strength, FMC, GMC, memory, insight, safety  and judgement . These impairments, as well as pt's fatigue, decreased caregiver support and risk for falls  limit pt's ability to safely engage in all baseline areas of occupation, includinggrooming, bathing, dressing, toileting, functional mobility/transfers, community mobility, laundry , house maintenance, medication management, meal prep, social participation  and leisure activities  From OT standpoint, recommend STR upon D/C. The patient's raw score on the -PAC Daily Activity Inpatient Short Form is 14. A raw score of less than 19 suggests the patient may benefit from discharge to post-acute rehabilitation services. Please refer to the recommendation of the Occupational Therapist for safe discharge planning. OT will continue to follow to address the below stated goals. Goals   Patient Goals go home   LTG Time Frame 10-14   Long Term Goal #1 see goals below   Plan   Treatment Interventions ADL retraining;Functional transfer training;UE strengthening/ROM; Endurance training;Cognitive reorientation;Patient/family training;Equipment evaluation/education; Compensatory technique education; Energy conservation   Goal Expiration Date 09/19/23   OT Frequency 2-3x/wk   Recommendation   OT Discharge Recommendation Post acute rehabilitation services  (if pt/family declines recommendation home OT)   AM-PAC Daily Activity Inpatient   Lower Body Dressing 2   Bathing 2   Toileting 2   Upper Body Dressing 2   Grooming 3   Eating 3   Daily Activity Raw Score 14   Daily Activity Standardized Score (Calc for Raw Score >=11) 33.39   AM-PAC Applied Cognition Inpatient   Following a Speech/Presentation 2   Understanding Ordinary Conversation 4   Taking Medications 4 Remembering Where Things Are Placed or Put Away 4   Remembering List of 4-5 Errands 3   Taking Care of Complicated Tasks 2   Applied Cognition Raw Score 19   Applied Cognition Standardized Score 39.77   End of Consult   Patient Position at End of Consult All needs within reach;Bed/Chair alarm activated;Supine   Nurse Communication Nurse aware of consult    Occupational Therapy Goals:    *S with bed mobility to engage in functional tasks. *S Adl's after setup with use of AE PRN  *S toileting and clothing management   *S  functional mobility and transfers to/from all surfaces with Fair + dynamic balance and safety for participation in dynamic adls and iadl tasks   *Demonstrate good carryover with safe use of RW during functional tasks   *Assess DME needs   *Increase activity tolerance to 25-30 minutes for participation in adls and enjoyable activities  *Pt to participate in further cognitive testing with good attention and participation to assist with safe d/c recommendations  *Mod I with Simulated IADL management task. *Demonstrate good carryover of pt/family education and training with good tolerance for increased safety and independence with ADL's/ADl's. *Pt will improve standing balance to 4-5 minutes with functional tasks to increase I with toileting/transfers.   *Patient will demonstrate 100% carryover of energy conservation techniques t/o functional I/ADL/leisure tasks w/o cues s/p skilled education to increase endurance during functional tasks  *Pt will follow 100% simple one step verbal commands and be A/Ox4 consistently t/o use of external environmental cues w/ mod I  Roberto GARCÍA, OTR/L

## 2023-09-06 LAB
ALBUMIN SERPL BCP-MCNC: 3.1 G/DL (ref 3.5–5)
ALP SERPL-CCNC: 53 U/L (ref 34–104)
ALT SERPL W P-5'-P-CCNC: 13 U/L (ref 7–52)
ANION GAP SERPL CALCULATED.3IONS-SCNC: 12 MMOL/L
AST SERPL W P-5'-P-CCNC: 15 U/L (ref 13–39)
BILIRUB SERPL-MCNC: 0.31 MG/DL (ref 0.2–1)
BUN SERPL-MCNC: 60 MG/DL (ref 5–25)
CALCIUM ALBUM COR SERPL-MCNC: 8.6 MG/DL (ref 8.3–10.1)
CALCIUM SERPL-MCNC: 7.9 MG/DL (ref 8.4–10.2)
CHLORIDE SERPL-SCNC: 95 MMOL/L (ref 96–108)
CO2 SERPL-SCNC: 28 MMOL/L (ref 21–32)
CREAT SERPL-MCNC: 1.52 MG/DL (ref 0.6–1.3)
ERYTHROCYTE [DISTWIDTH] IN BLOOD BY AUTOMATED COUNT: 15.9 % (ref 11.6–15.1)
GFR SERPL CREATININE-BSD FRML MDRD: 30 ML/MIN/1.73SQ M
GLUCOSE SERPL-MCNC: 102 MG/DL (ref 65–140)
HCT VFR BLD AUTO: 31.3 % (ref 34.8–46.1)
HGB BLD-MCNC: 10.2 G/DL (ref 11.5–15.4)
MCH RBC QN AUTO: 34.3 PG (ref 26.8–34.3)
MCHC RBC AUTO-ENTMCNC: 32.6 G/DL (ref 31.4–37.4)
MCV RBC AUTO: 105 FL (ref 82–98)
PLATELET # BLD AUTO: 117 THOUSANDS/UL (ref 149–390)
PMV BLD AUTO: 11.3 FL (ref 8.9–12.7)
POTASSIUM SERPL-SCNC: 4 MMOL/L (ref 3.5–5.3)
PROT SERPL-MCNC: 6 G/DL (ref 6.4–8.4)
RBC # BLD AUTO: 2.97 MILLION/UL (ref 3.81–5.12)
SODIUM SERPL-SCNC: 135 MMOL/L (ref 135–147)
T3 SERPL-MCNC: 0.3 NG/ML
WBC # BLD AUTO: 6.54 THOUSAND/UL (ref 4.31–10.16)

## 2023-09-06 PROCEDURE — 99233 SBSQ HOSP IP/OBS HIGH 50: CPT | Performed by: INTERNAL MEDICINE

## 2023-09-06 PROCEDURE — 85027 COMPLETE CBC AUTOMATED: CPT | Performed by: INTERNAL MEDICINE

## 2023-09-06 PROCEDURE — 99232 SBSQ HOSP IP/OBS MODERATE 35: CPT | Performed by: INTERNAL MEDICINE

## 2023-09-06 PROCEDURE — 84480 ASSAY TRIIODOTHYRONINE (T3): CPT | Performed by: INTERNAL MEDICINE

## 2023-09-06 PROCEDURE — 80053 COMPREHEN METABOLIC PANEL: CPT | Performed by: INTERNAL MEDICINE

## 2023-09-06 RX ORDER — GABAPENTIN 300 MG/1
300 CAPSULE ORAL 3 TIMES DAILY
Status: DISCONTINUED | OUTPATIENT
Start: 2023-09-07 | End: 2023-09-08 | Stop reason: HOSPADM

## 2023-09-06 RX ORDER — HYDROCODONE BITARTRATE AND ACETAMINOPHEN 5; 325 MG/1; MG/1
1 TABLET ORAL EVERY 6 HOURS PRN
Status: DISCONTINUED | OUTPATIENT
Start: 2023-09-06 | End: 2023-09-08 | Stop reason: HOSPADM

## 2023-09-06 RX ORDER — METHOCARBAMOL 500 MG/1
500 TABLET, FILM COATED ORAL EVERY 6 HOURS PRN
Status: DISCONTINUED | OUTPATIENT
Start: 2023-09-06 | End: 2023-09-08

## 2023-09-06 RX ORDER — LIDOCAINE 50 MG/G
1 PATCH TOPICAL DAILY
Status: DISCONTINUED | OUTPATIENT
Start: 2023-09-07 | End: 2023-09-08 | Stop reason: HOSPADM

## 2023-09-06 RX ORDER — GABAPENTIN 300 MG/1
300 CAPSULE ORAL ONCE
Status: COMPLETED | OUTPATIENT
Start: 2023-09-06 | End: 2023-09-06

## 2023-09-06 RX ORDER — PREDNISONE 10 MG/1
10 TABLET ORAL DAILY
Status: DISCONTINUED | OUTPATIENT
Start: 2023-09-07 | End: 2023-09-08 | Stop reason: HOSPADM

## 2023-09-06 RX ADMIN — ISOSORBIDE MONONITRATE 30 MG: 30 TABLET, EXTENDED RELEASE ORAL at 09:45

## 2023-09-06 RX ADMIN — PREDNISONE 5 MG: 5 TABLET ORAL at 09:45

## 2023-09-06 RX ADMIN — GABAPENTIN 300 MG: 300 CAPSULE ORAL at 18:22

## 2023-09-06 RX ADMIN — SERTRALINE 25 MG: 25 TABLET, FILM COATED ORAL at 09:44

## 2023-09-06 RX ADMIN — HEPARIN SODIUM 5000 UNITS: 5000 INJECTION INTRAVENOUS; SUBCUTANEOUS at 13:36

## 2023-09-06 RX ADMIN — ACETAMINOPHEN 975 MG: 325 TABLET, FILM COATED ORAL at 18:24

## 2023-09-06 RX ADMIN — TORSEMIDE 20 MG: 20 TABLET ORAL at 22:07

## 2023-09-06 RX ADMIN — METOPROLOL TARTRATE 25 MG: 25 TABLET, FILM COATED ORAL at 09:45

## 2023-09-06 RX ADMIN — GABAPENTIN 300 MG: 300 CAPSULE ORAL at 09:44

## 2023-09-06 RX ADMIN — METOPROLOL TARTRATE 25 MG: 25 TABLET, FILM COATED ORAL at 22:06

## 2023-09-06 RX ADMIN — ACETAMINOPHEN 975 MG: 325 TABLET, FILM COATED ORAL at 04:51

## 2023-09-06 RX ADMIN — ALLOPURINOL 150 MG: 300 TABLET ORAL at 09:45

## 2023-09-06 RX ADMIN — CEFEPIME 1000 MG: 1 INJECTION, POWDER, FOR SOLUTION INTRAMUSCULAR; INTRAVENOUS at 15:27

## 2023-09-06 RX ADMIN — Medication 2 G: at 20:26

## 2023-09-06 RX ADMIN — CLOPIDOGREL BISULFATE 75 MG: 75 TABLET, FILM COATED ORAL at 09:45

## 2023-09-06 RX ADMIN — GABAPENTIN 300 MG: 300 CAPSULE ORAL at 22:07

## 2023-09-06 RX ADMIN — TORSEMIDE 20 MG: 20 TABLET ORAL at 09:44

## 2023-09-06 RX ADMIN — HEPARIN SODIUM 5000 UNITS: 5000 INJECTION INTRAVENOUS; SUBCUTANEOUS at 22:09

## 2023-09-06 RX ADMIN — HEPARIN SODIUM 5000 UNITS: 5000 INJECTION INTRAVENOUS; SUBCUTANEOUS at 04:51

## 2023-09-06 RX ADMIN — LEVOTHYROXINE SODIUM 100 MCG: 100 TABLET ORAL at 05:28

## 2023-09-06 NOTE — PLAN OF CARE
Problem: Prexisting or High Potential for Compromised Skin Integrity  Goal: Skin integrity is maintained or improved  Description: INTERVENTIONS:  - Identify patients at risk for skin breakdown  - Assess and monitor skin integrity  - Assess and monitor nutrition and hydration status  - Monitor labs   - Assess for incontinence   - Turn and reposition patient  - Assist with mobility/ambulation  - Relieve pressure over bony prominences  - Avoid friction and shearing  - Provide appropriate hygiene as needed including keeping skin clean and dry  - Evaluate need for skin moisturizer/barrier cream  - Collaborate with interdisciplinary team   - Patient/family teaching  - Consider wound care consult   Outcome: Progressing     Problem: MOBILITY - ADULT  Goal: Maintain or return to baseline ADL function  Description: INTERVENTIONS:  -  Assess patient's ability to carry out ADLs; assess patient's baseline for ADL function and identify physical deficits which impact ability to perform ADLs (bathing, care of mouth/teeth, toileting, grooming, dressing, etc.)  - Assess/evaluate cause of self-care deficits   - Assess range of motion  - Assess patient's mobility; develop plan if impaired  - Assess patient's need for assistive devices and provide as appropriate  - Encourage maximum independence but intervene and supervise when necessary  - Involve family in performance of ADLs  - Assess for home care needs following discharge   - Consider OT consult to assist with ADL evaluation and planning for discharge  - Provide patient education as appropriate  Outcome: Progressing  Goal: Maintains/Returns to pre admission functional level  Description: INTERVENTIONS:  - Perform BMAT or MOVE assessment daily.   - Set and communicate daily mobility goal to care team and patient/family/caregiver.    - Collaborate with rehabilitation services on mobility goals if consulted    - Ambulate patient 2 times a day  -Out of bed for toileting  - Record patient progress and toleration of activity level   Outcome: Progressing     Problem: CARDIOVASCULAR - ADULT  Goal: Maintains optimal cardiac output and hemodynamic stability  Description: INTERVENTIONS:  - Monitor I/O, vital signs and rhythm  - Monitor for S/S and trends of decreased cardiac output  - Administer and titrate ordered vasoactive medications to optimize hemodynamic stability  - Assess quality of pulses, skin color and temperature  - Assess for signs of decreased coronary artery perfusion  - Instruct patient to report change in severity of symptoms  Outcome: Progressing  Goal: Absence of cardiac dysrhythmias or at baseline rhythm  Description: INTERVENTIONS:  - Continuous cardiac monitoring, vital signs, obtain 12 lead EKG if ordered  - Administer antiarrhythmic and heart rate control medications as ordered  - Monitor electrolytes and administer replacement therapy as ordered  Outcome: Progressing     Problem: GENITOURINARY - ADULT  Goal: Maintains or returns to baseline urinary function  Description: INTERVENTIONS:  - Assess urinary function  - Encourage oral fluids to ensure adequate hydration if ordered  - Administer IV fluids as ordered to ensure adequate hydration  - Administer ordered medications as needed  - Offer frequent toileting  - Follow urinary retention protocol if ordered  Outcome: Progressing  Goal: Absence of urinary retention  Description: INTERVENTIONS:  - Assess patient’s ability to void and empty bladder  - Monitor I/O  - Bladder scan as needed  - Discuss with physician/AP medications to alleviate retention as needed  - Discuss catheterization for long term situations as appropriate  Outcome: Progressing  Goal: Urinary catheter remains patent  Description: INTERVENTIONS:  - Assess patency of urinary catheter  - If patient has a chronic coelho, consider changing catheter if non-functioning  - Follow guidelines for intermittent irrigation of non-functioning urinary catheter  Outcome: Progressing     Problem: SKIN/TISSUE INTEGRITY - ADULT  Goal: Skin Integrity remains intact(Skin Breakdown Prevention)  Description: Assess:  -Assess extremities for adequate circulation and sensation     Bed Management:  -Have minimal linens on bed & keep smooth, unwrinkled    Outcome: Progressing  Goal: Pressure injury heals and does not worsen  Description: Interventions:  - Implement low air loss mattress or specialty surface (Criteria met)  Outcome: Progressing     Problem: PAIN - ADULT  Goal: Verbalizes/displays adequate comfort level or baseline comfort level  Description: Interventions:  - Encourage patient to monitor pain and request assistance  - Assess pain using appropriate pain scale  - Administer analgesics based on type and severity of pain and evaluate response  - Implement non-pharmacological measures as appropriate and evaluate response  - Consider cultural and social influences on pain and pain management  - Notify physician/advanced practitioner if interventions unsuccessful or patient reports new pain  Outcome: Progressing     Problem: Nutrition/Hydration-ADULT  Goal: Nutrient/Hydration intake appropriate for improving, restoring or maintaining nutritional needs  Description: Monitor and assess patient's nutrition/hydration status for malnutrition. Collaborate with interdisciplinary team and initiate plan and interventions as ordered. Monitor patient's weight and dietary intake as ordered or per policy. Utilize nutrition screening tool and intervene as necessary. Determine patient's food preferences and provide high-protein, high-caloric foods as appropriate.      INTERVENTIONS:  - Monitor oral intake, urinary output, labs, and treatment plans  - Assess nutrition and hydration status and recommend course of action  - Evaluate amount of meals eaten  - Assist patient with eating if necessary   - Allow adequate time for meals  - Recommend/ encourage appropriate diets, oral nutritional supplements, and vitamin/mineral supplements  - Order, calculate, and assess calorie counts as needed  - Recommend, monitor, and adjust tube feedings and TPN/PPN based on assessed needs  - Assess need for intravenous fluids  - Provide specific nutrition/hydration education as appropriate  - Include patient/family/caregiver in decisions related to nutrition  Outcome: Progressing

## 2023-09-06 NOTE — PROGRESS NOTES
[facility] [facility]  Progress Note  Name: Andie Plascencia  MRN: [id number]  Unit/Bed#: [facility] 701-01 I Date of Admission: 9/4/2023   Date of Service: 9/6/2023 I Hospital Day: 2    Assessment/Plan   * Acute cystitis without hematuria  Assessment & Plan  · Patient does acknowledge symptoms of urinary frequency/burning with mild suprapubic discomfort. However granddaughter reports that she has a history of urinary tract infection and urinary incontinence. Patient presents with low-grade temperature. · Continue with empiric antibiotic therapy pending final culture results. Unfortunately UA and cultures were obtained after patient receiving 1 dose of ceftriaxone. Reviewed previous urine cultures susceptibilities. We will transition IV ceftriaxone to cefepime awaiting urine cultures. · Monitor temperature curve  · Follow-up on blood cultures. Remains negative so far    Near syncope  Assessment & Plan  Patient reported recurrent episodes of near syncope. She has had multiple falls previously as per the granddaughter. Suspect secondary to UTI. Management as above. · orthostatic blood pressure checks  · Telemetry monitoring  · Appreciate cardiology recommendations. · PT/OT  · Echocardiogram regarding aortic valve. Patient with history of bioprosthetic valve. · Reduced dosing of Neurontin to twice daily from 3 times daily. Hold Robaxin. Continue with supportive care. Pancreatic lesion  Assessment & Plan  · CT abdomen "Apparent hyperenhancing 1.1 cm pancreatic body lesion, with soft tissue prominence unchanged compared to 10/8/2012 with new true or pseudo hyperenhancement. Findings can represent either an artifact or presence of a pancreatic neoplasm with low malignant potential such as neuroendocrine tumor"  · Tried to order MRI for further evaluation. I was reached out by radiology that this can be done as outpatient.  Especially with patient's CKD and getting contrast study on admission. Will have this ordered as outpatient by PCP. discussed with patient and daughter. Pressure injury of deep tissue  Assessment & Plan  POA. Wound care consult    [address]  · Evaluated by trauma team.  Further evaluation, no reports of acute fractures were found. · PT OT evaluation. · Check orthostatic blood pressure. Negative     History of stroke  Assessment & Plan  History noted. Outpatient follow-up  Continue Plavix. Hypertension  Assessment & Plan  · Continue with torsemide and metoprolol therapy. Patient also on Imdur. Family history of gout  Assessment & Plan  · Currently on allopurinol 150 daily    Type 2 diabetes mellitus ([address])  Assessment & Plan  · Sliding scale insulin. · Maintain blood glucose 140-180    Rheumatoid arthritis (HCC)  Assessment & Plan  · Prednisone 5 mg daily               VTE Pharmacologic Prophylaxis:   Moderate Risk (Score 3-4) - Pharmacological DVT Prophylaxis Ordered: heparin. Patient Centered Rounds: I performed bedside rounds with nursing staff today. Discussions with Specialists or Other Care Team Provider: cm     Education and Discussions with Family / Patient: Updated  (daughter) at bedside. Total Time Spent on Date of Encounter in care of patient: 45 minutes This time was spent on one or more of the following: performing physical exam; counseling and coordination of care; obtaining or reviewing history; documenting in the medical record; reviewing/ordering tests, medications or procedures; communicating with other healthcare professionals and discussing with patient's family/caregivers. Current Length of Stay: 2 day(s)  Current Patient Status: Inpatient   Certification Statement: The patient will continue to require additional inpatient hospital stay due to iv abx  Discharge Plan: Anticipate discharge in 48-72 hrs to home with home services. Code Status: Level 1 - Full Code    Subjective:   No Acute events. No new complaints. Objective:     Vitals:   Temp (24hrs), Av °F (36.7 °C), Min:97.9 °F (36.6 °C), Max:98.1 °F (36.7 °C)    Temp:  [97.9 °F (36.6 °C)-98.1 °F (36.7 °C)] 98.1 °F (36.7 °C)  HR:  [64-68] 65  Resp:  [18] 18  BP: (109-158)/(61-68) 147/61  SpO2:  [95 %-98 %] 95 %  Body mass index is 32.32 kg/m². Input and Output Summary (last 24 hours): Intake/Output Summary (Last 24 hours) at 2023 1332  Last data filed at 2023 0501  Gross per 24 hour   Intake --   Output 800 ml   Net -800 ml       Physical Exam:   Physical Exam  Vitals and nursing note reviewed. Constitutional:       General: She is not in acute distress. Appearance: She is well-developed. HENT:      Head: Normocephalic and atraumatic. Eyes:      Conjunctiva/sclera: Conjunctivae normal.   Cardiovascular:      Rate and Rhythm: Normal rate and regular rhythm. Heart sounds: No murmur heard. Pulmonary:      Effort: Pulmonary effort is normal. No respiratory distress. Breath sounds: Normal breath sounds. Abdominal:      Palpations: Abdomen is soft. Tenderness: There is no abdominal tenderness. Musculoskeletal:         General: No swelling. Cervical back: Neck supple. Skin:     General: Skin is warm and dry. Capillary Refill: Capillary refill takes less than 2 seconds. Neurological:      General: No focal deficit present. Mental Status: She is alert and oriented to person, place, and time.    Psychiatric:         Mood and Affect: Mood normal.         Behavior: Behavior normal.          Additional Data:     Labs:  Results from last 7 days   Lab Units 23  0444 23  0527   WBC Thousand/uL 6.54 5.73   HEMOGLOBIN g/dL 10.2* 10.8*   HEMATOCRIT % 31.3* 33.4*   PLATELETS Thousands/uL 117* 113*   NEUTROS PCT %  --  57   LYMPHS PCT %  --  34   MONOS PCT %  --  5   EOS PCT %  --  2     Results from last 7 days   Lab Units 23  0444   SODIUM mmol/L 135   POTASSIUM mmol/L 4.0   CHLORIDE mmol/L 95*   CO2 mmol/L 28   BUN mg/dL 60*   CREATININE mg/dL 1.52*   ANION GAP mmol/L 12   CALCIUM mg/dL 7.9*   ALBUMIN g/dL 3.1*   TOTAL BILIRUBIN mg/dL 0.31   ALK PHOS U/L 53   ALT U/L 13   AST U/L 15   GLUCOSE RANDOM mg/dL 102     Results from last 7 days   Lab Units 09/04/23  1311   INR  0.97             Results from last 7 days   Lab Units 09/04/23  1548 09/04/23  1311   LACTIC ACID mmol/L 1.7 3.5*   PROCALCITONIN ng/ml  --  0.33*       Lines/Drains:  Invasive Devices     Peripheral Intravenous Line  Duration           Peripheral IV 09/04/23 Right Antecubital 2 days    Peripheral IV 09/04/23 Right;Ventral (anterior) Hand 2 days                      Imaging: No pertinent imaging reviewed. Recent Cultures (last 7 days):   Results from last 7 days   Lab Units 09/04/23  1604   BLOOD CULTURE  No Growth at 24 hrs. No Growth at 24 hrs. Last 24 Hours Medication List:   Current Facility-Administered Medications   Medication Dose Route Frequency Provider Last Rate   • acetaminophen  975 mg Oral Q6H PRN Elenita Edward PA-C     • albuterol  2 puff Inhalation Q6H PRN Hetul Hatch, DO     • allopurinol  150 mg Oral Daily Hetul Hatch, DO     • cefepime  1,000 mg Intravenous Q12H Deshaun Park MD     • clopidogrel  75 mg Oral Daily Hetul Hatch, DO     • gabapentin  300 mg Oral BID Hetul Hatch, DO     • heparin (porcine)  5,000 Units Subcutaneous [facility] Malcolm Brenner MD     • isosorbide mononitrate  30 mg Oral Daily Hetul Hatch, DO     • levothyroxine  100 mcg Oral Daily Deshaun Park MD     • metoprolol tartrate  25 mg Oral Q12H [address] Hetul Hatch, DO     • ondansetron  4 mg Intravenous Q6H PRN Hetul Hatch, DO     • predniSONE  5 mg Oral Daily Hetul Hatch, DO     • sertraline  25 mg Oral Daily Hetul Hatch, DO     • torsemide  20 mg Oral BID Hetul Hatch, DO          Today, Patient Was Seen By: Nadine Heath MD    **Please Note: This note may have been constructed using a voice recognition system. **

## 2023-09-06 NOTE — ASSESSMENT & PLAN NOTE
· Patient does acknowledge symptoms of urinary frequency/burning with mild suprapubic discomfort. However granddaughter reports that she has a history of urinary tract infection and urinary incontinence. Patient presents with low-grade temperature. · Continue with empiric antibiotic therapy pending final culture results. Unfortunately UA and cultures were obtained after patient receiving 1 dose of ceftriaxone. Reviewed previous urine cultures susceptibilities. We will transition IV ceftriaxone to cefepime awaiting urine cultures. · Monitor temperature curve  · Follow-up on blood cultures.   Remains negative so far

## 2023-09-06 NOTE — PROGRESS NOTES
Cardiology Progress Note - Edgar De Luna 80 y.o. female MRN: [id number]    Unit/Bed#: [facility] 701-01 Encounter: [id number]      Assessment:  Principal Problem:    Acute cystitis without hematuria  Active Problems:    Near syncope    Rheumatoid arthritis ([address])    Diabetes ([address])    Family history of gout    Hypertension    Hyperlipidemia    History of stroke    Fall    Pressure injury of deep tissue    Pancreatic lesion       Plan:  Patient is comfortable this morning. She has no chest pain or significant dyspnea. She had no issues overnight. BMP today with potassium of 4.0 and creatinine of 1.52. Hemoglobin 10.2. We will continue current medical regimen. Echocardiogram done yesterday with LVEF of 35%. Mitral valve repair appears competent with mild to moderate MR noted. Bio AVR with mean gradient of 18 mmHg across the valve. Findings in general were comparable to prior echo done February 10, 2023. No further cardiac testing at this point. Can get a AntennaO monitor as an outpatient. Patient typically follows with Dr. Lacey Ziegler. Subjective:   Patient seen and examined. No significant events overnight.  negative. Objective:     Vitals: Blood pressure 158/68, pulse 64, temperature 98.1 °F (36.7 °C), resp. rate 18, height 4' 11" (1.499 m), weight 72.6 kg (160 lb), SpO2 98 %. , Body mass index is 32.32 kg/m².,   Orthostatic Blood Pressures    Flowsheet Row Most Recent Value   Blood Pressure 158/68 filed at 09/06/2023 3264   Patient Position - Orthostatic VS Standing - Orthostatic VS filed at 09/05/2023 1018      ,      Intake/Output Summary (Last 24 hours) at 9/6/2023 0900  Last data filed at 9/6/2023 0501  Gross per 24 hour   Intake --   Output 800 ml   Net -800 ml             Physical Exam:    GEN: Edgar De Luna   NECK: supple, no carotid bruits, no JVD or HJR  HEART: normal rate, regular rhythm, normal S1 and S2,  systolic murmur  LUNGS: clear to auscultation bilaterally; no wheezes, rales, or rhonchi ABDOMEN: normal bowel sounds, soft, no tenderness, no distention  EXTREMITIES: peripheral pulses normal; no clubbing, cyanosis, or edema  SKIN: warm and well perfused, no suspicious lesions on exposed skin    Labs & Results:    No results displayed because visit has over 200 results. Echo complete w/ contrast if indicated    Result Date: 9/5/2023  Narrative: •  Left Ventricle: Left ventricular cavity size is moderately dilated. Wall thickness is normal. The left ventricular ejection fraction is 35%. Systolic function is moderate to severely reduced. •  The following segments are akinetic: mid anteroseptal, mid inferoseptal, apical anterior, apical septal, apical inferior, apical lateral and apex. •  The following segments are hypokinetic: basal inferior, mid anterior, mid inferior, mid inferolateral and mid anterolateral. •  Right Ventricle: Systolic function is mildly reduced. •  Left Atrium: The atrium is mildly dilated. •  Aortic Valve: The aortic valve was not well visualized. There is a bioprosthetic valve. Prosthetic valve leaflet motion is restricted. There is no evidence of paravalvular regurgitation. 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 The gradient recorded across the prosthetic aortic valve is above the expected range. •  Mitral Valve: The valve has been repaired with an annular ring. There is mild to moderate regurgitation. There is no evidence of stenosis. The mitral valve mean gradient is 4mmHg. •  Tricuspid Valve: There is mild regurgitation. •  Pulmonary Artery: The pulmonary artery systolic pressure is normal.     US bedside procedure    Result Date: 9/5/2023  Narrative: [id number]. [id number]. [id number]    XR shoulder 2+ vw left    Result Date: 9/5/2023  Narrative: LEFT SHOULDER INDICATION:   pain. COMPARISON:  None VIEWS:  XR SHOULDER 2+ VW LEFT FINDINGS: There is no acute fracture or dislocation. Moderate glenohumeral joint osteoarthritis is present. Mild acromioclavicular joint osteoarthritis is present. No lytic or blastic osseous lesion. Soft tissues are unremarkable. Impression: No acute osseous abnormality. Degenerative changes as described. Workstation performed: [id number]     TRAUMA - CT spine cervical wo contrast    Result Date: 9/4/2023  Narrative: CT CERVICAL SPINE - WITHOUT CONTRAST INDICATION:   TRAUMA. COMPARISON: Prior CT cervical spine dated 2/9/2023 and 5/19/2022 from different patient jacket TECHNIQUE:  CT examination of the cervical spine was performed without intravenous contrast.  Contiguous axial images were obtained. Multiplanar 2D reformatted images were created from the source data. Radiation dose length product (DLP) for this visit:  345 mGy-cm . This examination, like all CT scans performed in the [facility], was performed utilizing techniques to minimize radiation dose exposure, including the use of iterative reconstruction and automated exposure control. IMAGE QUALITY:  Diagnostic. FINDINGS: ALIGNMENT: Patient is status post posterior fusion of C1 and C2 with prior displaced fracture deformity of C2. There is increased lordosis at C2-C3 with multilevel degenerative changes and fused anterior osteophytes at C3-T1 level. VERTEBRAE again seen is a mildly displaced fracture of the left lateral mass of the C2 vertebral body (series 5 image 43), stable in alignment compared to 5/19/2022 DEGENERATIVE CHANGES:  Moderate multilevel cervical degenerative changes are noted. There is mild to moderate spinal canal stenosis and likely neuroforaminal narrowing at C2-C3 level PREVERTEBRAL AND PARASPINAL SOFT TISSUES: Unremarkable THORACIC INLET: A 1 mm right apical pulmonary nodule (5/124 and 2 mm left apical pulmonary nodule (5/129. Secretions in the upper thoracic esophagus. .     Impression: No new cervical spine fracture or traumatic malalignment.  Stable chronic mildly displaced fracture of the left lateral mass of C2 vertebral body, compared to 5/19/2022. Posterior fusion at C1-C2 level with moderate degenerative changes and mild to moderate spinal canal stenosis and likely neuroforaminal narrowing at C2-C3 level Small biapical pulmonary nodules measuring up to 2 mm. Based on current Fleischner Society 2017 Guidelines on incidental pulmonary nodule, no routine follow-up is needed if the patient is low risk. If the patient is high risk, optional follow-up chest CT at 12 months can be considered. I personally discussed this study with Braden England on 9/4/2023 2:35 PM. Workstation performed: [id number]     TRAUMA - CT head wo contrast    Result Date: 9/4/2023  Narrative: CT BRAIN - WITHOUT CONTRAST INDICATION:   TRAUMA. COMPARISON: CT head 2/9/2023 from different patient jacket TECHNIQUE:  CT examination of the brain was performed. Multiplanar 2D reformatted images were created from the source data. Radiation dose length product (DLP) for this visit:  895.44 mGy-cm . This examination, like all CT scans performed in the [facility], was performed utilizing techniques to minimize radiation dose exposure, including the use of iterative  reconstruction and automated exposure control. IMAGE QUALITY:  Diagnostic. FINDINGS: PARENCHYMA:  No intracranial mass, mass effect or midline shift. Mild volume loss. Chronic infarction involving the right lentiform nucleus. There are moderate to severe periventricular and subcortical patchy areas of low attenuation, which are nonspecific but statistically likely represent chronic microvascular ischemic changes. No CT signs of acute infarction. No acute parenchymal hemorrhage. Atherosclerotic vascular calcifications in carotid and vertebral arteries are more advanced than would be expected for the patient's age. VENTRICLES AND EXTRA-AXIAL SPACES:  Normal for the patient's age.  A 0.4 cm osseous lesion along the left frontal convexity (series 3 image 24), likely representing a calcified meningioma, which is stable since 2/9/2023. VISUALIZED ORBITS: Bilateral globes are aphakic. Harrel Maxim PARANASAL SINUSES: Normal visualized paranasal sinuses. CALVARIUM AND EXTRACRANIAL SOFT TISSUES:  Normal.     Impression: No acute intracranial abnormality. Stable chronic microvascular ischemic changes and lacunar infarcts as described above. I personally discussed this study with Sebastián Barragan on 9/4/2023 2:35 PM. Workstation performed: [id number]     CT chest abdomen pelvis w contrast    Result Date: 9/4/2023  Narrative: CT CHEST, ABDOMEN AND PELVIS WITH IV CONTRAST INDICATION:   Trauma. COMPARISON: CT abdomen/pelvis 2/10/2023 from different patient jacket. TECHNIQUE: CT examination of the chest, abdomen and pelvis was performed. Multiplanar 2D reformatted images were created from the source data. 3D reconstructions of the bony thorax were performed in order to improve sensitivity of evaluation for rib fractures. This examination, like all CT scans performed in the [facility], was performed utilizing techniques to minimize radiation dose exposure, including the use of iterative reconstruction and automated exposure control. Radiation dose length product (DLP) for this visit:  926.78 mGy-cm IV Contrast:  100 mL of iohexol (OMNIPAQUE) Enteric Contrast: Enteric contrast was administered. FINDINGS: Suboptimal examination due to motion artifact. CHEST LUNGS: Bibasilar subsegmental atelectasis. . There are 2 small peripheral 2 mm right upper lobe pulmonary nodules on series 13 image 34 and 35. Smaller biapical pulmonary nodules are better visualized on CT cervical spine performed on the same day. There is no tracheal or endobronchial lesion. PLEURA: A small stable simple right pleural effusion. Harrel Maxim HEART/GREAT VESSELS: Heavy atherosclerotic coronary artery calcification is noted. Heart is otherwise unremarkable. No thoracic aortic aneurysm.  MEDIASTINUM AND SADIQ: Unremarkable. CHEST WALL AND LOWER NECK:  Unremarkable. ABDOMEN LIVER/BILIARY TREE:  Unremarkable. Stable focal dilation of the distal left intrahepatic portal vein GALLBLADDER:  No calcified gallstones. No pericholecystic inflammatory change. SPLEEN:  Unremarkable. PANCREAS: There is a 1.1 cm hyperenhancing pancreatic body lesion (series 9 image 123). It is not as hyperenhancing on recent prior CT examination 2/10/2023, however the tissue prominence is also retrospectively visualized on the comparison examination. ADRENAL GLANDS:  Unremarkable. KIDNEYS/URETERS: Mild atrophy of bilateral kidneys. No hydronephrosis. STOMACH AND BOWEL:  There is colonic diverticulosis without evidence of acute diverticulitis. APPENDIX:  A normal appendix was visualized. ABDOMINOPELVIC CAVITY:  No ascites. No pneumoperitoneum. No lymphadenopathy. VESSELS:  Atherosclerotic changes are present. 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 Curtistine Ghanshyam PELVIS REPRODUCTIVE ORGANS:  Surgical changes of prior hysterectomy. URINARY BLADDER:  Unremarkable. ABDOMINAL WALL/INGUINAL REGIONS:  Unremarkable. OSSEOUS STRUCTURES:  No acute fracture or destructive osseous lesion. Stable chronic compression fracture deformities of T12, L1, L4 and L5 vertebral bodies with height loss compared to 2/10/2023. Stable grade 1 anterolisthesis of L5 over S1. Old fracture deformity of left superior pubic ramus. Impression: No acute traumatic injury of the chest, abdomen and pelvis. Apparent hyperenhancing 1.1 cm pancreatic body lesion, with soft tissue prominence unchanged compared to 10/8/2012 with new true or pseudo hyperenhancement. Findings can represent either an artifact or presence of a pancreatic neoplasm with low malignant  potential such as neuroendocrine tumor. MRI abdomen with and without contrast is recommended for further assessment. Small bilateral pulmonary nodules.  based on current Fleischner Society 2017 Guidelines on incidental pulmonary nodule, no routine follow-up is needed if the patient is low risk. If the patient is high risk, optional follow-up chest CT at 12 months can be considered. 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 I personally discussed this study with Yohana Rosa on 9/4/2023 2:35 PM. Workstation performed: [id number]     XR Trauma multiple ([facility] trauma bay ONLY)    Result Date: 9/4/2023  Narrative: TRAUMA SERIES INDICATION:  TRAUMA. COMPARISON:  None VIEWS:  XR TRAUMA MULTIPLE FINDINGS: CHEST: Supine frontal view of the chest is obtained. Sternotomy wires. Cardiomediastinal silhouette is within normal limits accounting for technique and patient positioning. A small right pleural effusion and bibasilar subsegmental atelectasis. Jeff Negus No pneumothorax is seen on this supine film. Upright images are more sensitive to detect anterior pneumothoraces if relevant. No displaced fractures. Degenerative changes of bilateral acromioclavicular joint. Impression: No acute cardiopulmonary disease within limitations of supine imaging. Workstation performed: [id number]       EKG personally reviewed by Marques Munoz MD.     Counseling / Coordination of Care  Total floor / unit time spent today 30 minutes. Greater than 50% of total time was spent with the patient and / or family counseling and / or coordination of care.

## 2023-09-06 NOTE — ASSESSMENT & PLAN NOTE
· Evaluated by trauma team.  Further evaluation, no reports of acute fractures were found. · PT OT evaluation. · Check orthostatic blood pressure.  Negative

## 2023-09-06 NOTE — RESTORATIVE TECHNICIAN NOTE
Restorative Technician Note      Patient Name: Tracie Arce     Note Type: Mobility  Patient Position Upon Consult: Supine  Activity Performed: Ambulated; Stood; Dangled  Assistive Device: Other (Comment) (Assist x2 to commode/bed)  Education Provided: Yes  Patient Position at End of Consult: Supine;  All needs within reach; Bed/Chair alarm activated    Ramos MCINTYRE, Restorative Technician, [employer]

## 2023-09-06 NOTE — UTILIZATION REVIEW
Date: 9-6-23     Day 3: Has surpassed a 2nd midnight with active treatments and services, which include iv antibiotics, follow cultures, therapy for functional deficits, safe discharge planning.    See initial review completed by Bridget Lee  on 9-5-2023

## 2023-09-06 NOTE — ASSESSMENT & PLAN NOTE
· CT abdomen "Apparent hyperenhancing 1.1 cm pancreatic body lesion, with soft tissue prominence unchanged compared to 10/8/2012 with new true or pseudo hyperenhancement. Findings can represent either an artifact or presence of a pancreatic neoplasm with low malignant potential such as neuroendocrine tumor"  · Tried to order MRI for further evaluation. I was reached out by radiology that this can be done as outpatient. Especially with patient's CKD and getting contrast study on admission. Will have this ordered as outpatient by PCP. discussed with patient and daughter.

## 2023-09-07 PROBLEM — R55 NEAR SYNCOPE: Status: RESOLVED | Noted: 2023-09-04 | Resolved: 2023-09-07

## 2023-09-07 LAB
ANION GAP SERPL CALCULATED.3IONS-SCNC: 8 MMOL/L
BACTERIA UR CULT: NORMAL
BUN SERPL-MCNC: 56 MG/DL (ref 5–25)
CALCIUM SERPL-MCNC: 8.7 MG/DL (ref 8.4–10.2)
CHLORIDE SERPL-SCNC: 98 MMOL/L (ref 96–108)
CO2 SERPL-SCNC: 32 MMOL/L (ref 21–32)
CREAT SERPL-MCNC: 1.55 MG/DL (ref 0.6–1.3)
ERYTHROCYTE [DISTWIDTH] IN BLOOD BY AUTOMATED COUNT: 15.7 % (ref 11.6–15.1)
GFR SERPL CREATININE-BSD FRML MDRD: 29 ML/MIN/1.73SQ M
GLUCOSE SERPL-MCNC: 107 MG/DL (ref 65–140)
HCT VFR BLD AUTO: 34.4 % (ref 34.8–46.1)
HGB BLD-MCNC: 10.6 G/DL (ref 11.5–15.4)
MCH RBC QN AUTO: 32.9 PG (ref 26.8–34.3)
MCHC RBC AUTO-ENTMCNC: 30.8 G/DL (ref 31.4–37.4)
MCV RBC AUTO: 107 FL (ref 82–98)
PLATELET # BLD AUTO: 136 THOUSANDS/UL (ref 149–390)
PMV BLD AUTO: 10.9 FL (ref 8.9–12.7)
POTASSIUM SERPL-SCNC: 4.1 MMOL/L (ref 3.5–5.3)
RBC # BLD AUTO: 3.22 MILLION/UL (ref 3.81–5.12)
SODIUM SERPL-SCNC: 138 MMOL/L (ref 135–147)
WBC # BLD AUTO: 5.5 THOUSAND/UL (ref 4.31–10.16)

## 2023-09-07 PROCEDURE — 80048 BASIC METABOLIC PNL TOTAL CA: CPT | Performed by: INTERNAL MEDICINE

## 2023-09-07 PROCEDURE — 99233 SBSQ HOSP IP/OBS HIGH 50: CPT | Performed by: INTERNAL MEDICINE

## 2023-09-07 PROCEDURE — 85027 COMPLETE CBC AUTOMATED: CPT | Performed by: INTERNAL MEDICINE

## 2023-09-07 RX ADMIN — METOPROLOL TARTRATE 25 MG: 25 TABLET, FILM COATED ORAL at 10:55

## 2023-09-07 RX ADMIN — ALLOPURINOL 150 MG: 300 TABLET ORAL at 10:52

## 2023-09-07 RX ADMIN — GABAPENTIN 300 MG: 300 CAPSULE ORAL at 15:33

## 2023-09-07 RX ADMIN — TORSEMIDE 20 MG: 20 TABLET ORAL at 21:06

## 2023-09-07 RX ADMIN — SERTRALINE 25 MG: 25 TABLET, FILM COATED ORAL at 10:52

## 2023-09-07 RX ADMIN — CEFTRIAXONE SODIUM 2000 MG: 10 INJECTION, POWDER, FOR SOLUTION INTRAVENOUS at 15:32

## 2023-09-07 RX ADMIN — GABAPENTIN 300 MG: 300 CAPSULE ORAL at 10:51

## 2023-09-07 RX ADMIN — CEFEPIME 1000 MG: 1 INJECTION, POWDER, FOR SOLUTION INTRAMUSCULAR; INTRAVENOUS at 02:34

## 2023-09-07 RX ADMIN — GABAPENTIN 300 MG: 300 CAPSULE ORAL at 21:06

## 2023-09-07 RX ADMIN — ISOSORBIDE MONONITRATE 30 MG: 30 TABLET, EXTENDED RELEASE ORAL at 10:54

## 2023-09-07 RX ADMIN — HEPARIN SODIUM 5000 UNITS: 5000 INJECTION INTRAVENOUS; SUBCUTANEOUS at 21:06

## 2023-09-07 RX ADMIN — HEPARIN SODIUM 5000 UNITS: 5000 INJECTION INTRAVENOUS; SUBCUTANEOUS at 06:03

## 2023-09-07 RX ADMIN — LEVOTHYROXINE SODIUM 100 MCG: 100 TABLET ORAL at 06:03

## 2023-09-07 RX ADMIN — TORSEMIDE 20 MG: 20 TABLET ORAL at 10:55

## 2023-09-07 RX ADMIN — HEPARIN SODIUM 5000 UNITS: 5000 INJECTION INTRAVENOUS; SUBCUTANEOUS at 15:33

## 2023-09-07 RX ADMIN — METOPROLOL TARTRATE 25 MG: 25 TABLET, FILM COATED ORAL at 21:06

## 2023-09-07 RX ADMIN — LIDOCAINE 1 PATCH: 700 PATCH TOPICAL at 10:54

## 2023-09-07 RX ADMIN — PREDNISONE 10 MG: 10 TABLET ORAL at 10:51

## 2023-09-07 RX ADMIN — CLOPIDOGREL BISULFATE 75 MG: 75 TABLET, FILM COATED ORAL at 10:53

## 2023-09-07 RX ADMIN — Medication 2 G: at 10:53

## 2023-09-07 NOTE — PLAN OF CARE
Problem: Prexisting or High Potential for Compromised Skin Integrity  Goal: Skin integrity is maintained or improved  Description: INTERVENTIONS:  - Identify patients at risk for skin breakdown  - Assess and monitor skin integrity  - Assess and monitor nutrition and hydration status  - Monitor labs   - Assess for incontinence   - Turn and reposition patient  - Assist with mobility/ambulation  - Relieve pressure over bony prominences  - Avoid friction and shearing  - Provide appropriate hygiene as needed including keeping skin clean and dry  - Evaluate need for skin moisturizer/barrier cream  - Collaborate with interdisciplinary team   - Patient/family teaching  - Consider wound care consult   Outcome: Progressing     Problem: MOBILITY - ADULT  Goal: Maintain or return to baseline ADL function  Description: INTERVENTIONS:  -  Assess patient's ability to carry out ADLs; assess patient's baseline for ADL function and identify physical deficits which impact ability to perform ADLs (bathing, care of mouth/teeth, toileting, grooming, dressing, etc.)  - Assess/evaluate cause of self-care deficits   - Assess range of motion  - Assess patient's mobility; develop plan if impaired  - Assess patient's need for assistive devices and provide as appropriate  - Encourage maximum independence but intervene and supervise when necessary  - Involve family in performance of ADLs  - Assess for home care needs following discharge   - Consider OT consult to assist with ADL evaluation and planning for discharge  - Provide patient education as appropriate  Outcome: Progressing  Goal: Maintains/Returns to pre admission functional level  Description: INTERVENTIONS:  - Perform BMAT or MOVE assessment daily.   - Set and communicate daily mobility goal to care team and patient/family/caregiver. - Collaborate with rehabilitation services on mobility goals if consulted  - Perform Range of Motion 3 times a day.   - Reposition patient every 3 hours.  - Dangle patient 3 times a day  - Stand patient 3 times a day  - Ambulate patient 3 times a day  - Out of bed to chair 3 times a day   - Out of bed for meals 3 times a day  - Out of bed for toileting  - Record patient progress and toleration of activity level   Outcome: Progressing     Problem: CARDIOVASCULAR - ADULT  Goal: Maintains optimal cardiac output and hemodynamic stability  Description: INTERVENTIONS:  - Monitor I/O, vital signs and rhythm  - Monitor for S/S and trends of decreased cardiac output  - Administer and titrate ordered vasoactive medications to optimize hemodynamic stability  - Assess quality of pulses, skin color and temperature  - Assess for signs of decreased coronary artery perfusion  - Instruct patient to report change in severity of symptoms  Outcome: Progressing  Goal: Absence of cardiac dysrhythmias or at baseline rhythm  Description: INTERVENTIONS:  - Continuous cardiac monitoring, vital signs, obtain 12 lead EKG if ordered  - Administer antiarrhythmic and heart rate control medications as ordered  - Monitor electrolytes and administer replacement therapy as ordered  Outcome: Progressing     Problem: GENITOURINARY - ADULT  Goal: Maintains or returns to baseline urinary function  Description: INTERVENTIONS:  - Assess urinary function  - Encourage oral fluids to ensure adequate hydration if ordered  - Administer IV fluids as ordered to ensure adequate hydration  - Administer ordered medications as needed  - Offer frequent toileting  - Follow urinary retention protocol if ordered  Outcome: Progressing  Goal: Absence of urinary retention  Description: INTERVENTIONS:  - Assess patient’s ability to void and empty bladder  - Monitor I/O  - Bladder scan as needed  - Discuss with physician/AP medications to alleviate retention as needed  - Discuss catheterization for long term situations as appropriate  Outcome: Progressing  Goal: Urinary catheter remains patent  Description: INTERVENTIONS:  - Assess patency of urinary catheter  - If patient has a chronic coelho, consider changing catheter if non-functioning  - Follow guidelines for intermittent irrigation of non-functioning urinary catheter  Outcome: Progressing     Problem: SKIN/TISSUE INTEGRITY - ADULT  Goal: Skin Integrity remains intact(Skin Breakdown Prevention)  Description: Assess:  -Perform Marito assessment every day  -Clean and moisturize skin every daily   -Inspect skin when repositioning, toileting, and assisting with ADLS  -Assess under medical devices such as  every day  -Assess extremities for adequate circulation and sensation     Bed Management:  -Have minimal linens on bed & keep smooth, unwrinkled  -Change linens as needed when moist or perspiring  -Avoid sitting or lying in one position for more than 2 hours while in bed  -Keep HOB at 45 degrees     Toileting:  -Offer bedside commode  -Assess for incontinence every 2 hours   -Use incontinent care products after each incontinent episode such as wipes     Activity:  -Mobilize patient 3 times a day  -Encourage activity and walks on unit  -Encourage or provide ROM exercises   -Turn and reposition patient every 3 Hours  -Use appropriate equipment to lift or move patient in bed  -Instruct/ Assist with weight shifting every 3 when out of bed in chair  -Consider limitation of chair time 3 hour intervals    Skin Care:  -Avoid use of baby powder, tape, friction and shearing, hot water or constrictive clothing  -Relieve pressure over bony prominences using wedges  -Do not massage red bony areas    Next Steps:  -Teach patient strategies to minimize risks such as    -Consider consults to  interdisciplinary teams such as   Outcome: Progressing  Goal: Incision(s), wounds(s) or drain site(s) healing without S/S of infection  Description: INTERVENTIONS  - Assess and document dressing, incision, wound bed, drain sites and surrounding tissue  - Provide patient and family education  - Perform skin care/dressing changes every day   Outcome: Progressing  Goal: Pressure injury heals and does not worsen  Description: Interventions:  - Implement low air loss mattress or specialty surface (Criteria met)  - Apply silicone foam dressing  - Instruct/assist with weight shifting every 30  minutes when in chair   - Limit chair time to 4  hour intervals  - Use special pressure reducing interventions such as  when in chair   - Apply fecal or urinary incontinence containment device   - Perform passive or active ROM every hour   - Turn and reposition patient & offload bony prominences every 2 hours   - Utilize friction reducing device or surface for transfers   - Consider consults to  interdisciplinary teams such as wound care   - Use incontinent care products after each incontinent episode such as wipes   - Consider nutrition services referral as needed  Outcome: Progressing     Problem: PAIN - ADULT  Goal: Verbalizes/displays adequate comfort level or baseline comfort level  Description: Interventions:  - Encourage patient to monitor pain and request assistance  - Assess pain using appropriate pain scale  - Administer analgesics based on type and severity of pain and evaluate response  - Implement non-pharmacological measures as appropriate and evaluate response  - Consider cultural and social influences on pain and pain management  - Notify physician/advanced practitioner if interventions unsuccessful or patient reports new pain  Outcome: Progressing     Problem: Nutrition/Hydration-ADULT  Goal: Nutrient/Hydration intake appropriate for improving, restoring or maintaining nutritional needs  Description: Monitor and assess patient's nutrition/hydration status for malnutrition. Collaborate with interdisciplinary team and initiate plan and interventions as ordered. Monitor patient's weight and dietary intake as ordered or per policy. Utilize nutrition screening tool and intervene as necessary.  Determine patient's food preferences and provide high-protein, high-caloric foods as appropriate.      INTERVENTIONS:  - Monitor oral intake, urinary output, labs, and treatment plans  - Assess nutrition and hydration status and recommend course of action  - Evaluate amount of meals eaten  - Assist patient with eating if necessary   - Allow adequate time for meals  - Recommend/ encourage appropriate diets, oral nutritional supplements, and vitamin/mineral supplements  - Order, calculate, and assess calorie counts as needed  - Recommend, monitor, and adjust tube feedings and TPN/PPN based on assessed needs  - Assess need for intravenous fluids  - Provide specific nutrition/hydration education as appropriate  - Include patient/family/caregiver in decisions related to nutrition  Outcome: Progressing

## 2023-09-07 NOTE — RESTORATIVE TECHNICIAN NOTE
Restorative Technician Note      Patient Name: Loretta Lopez     Note Type: Mobility  Patient Position Upon Consult: Supine  Activity Performed: Ambulated; Stood; Dangled  Assistive Device: Other (Comment) (Assist x2)  Education Provided: Yes  Patient Position at End of Consult: All needs within reach;  Other (comment) (Assisted pt to the bedside commode.)    Wili MCINTYRE, Restorative Technician, [employer]

## 2023-09-07 NOTE — PROGRESS NOTES
[address] [facility]  Progress Note  Name: Ajit Stringer  MRN: [id number]  Unit/Bed#: [facility] 701-01 I Date of Admission: 9/4/2023   Date of Service: 9/7/2023 I Hospital Day: 3    Assessment/Plan   * Acute cystitis without hematuria  Assessment & Plan  · Patient does acknowledge symptoms of urinary frequency/burning with mild suprapubic discomfort. However granddaughter reports that she has a history of urinary tract infection and urinary incontinence. Patient presents with low-grade temperature. · Continue with empiric antibiotic therapy pending final culture results. Unfortunately UA and cultures were obtained after patient receiving 1 dose of ceftriaxone. Reviewed previous urine cultures susceptibilities and transition to cefepime. · Urine culture showing 40k-50k mixed contaminant. Considering patient received antibiotics prior to obtaining urine cultures. Will finish empiric 3 days course of antibiotics. Will transition back to ceftriaxone for 1 more dose. · Follow-up on blood cultures. Remains negative so far    Near syncope-resolved as of 9/7/2023  Assessment & Plan  Patient reported recurrent episodes of near syncope. She has had multiple falls previously as per the granddaughter. Suspect secondary to UTI. Management as above. · orthostatic blood pressure checks  · Telemetry monitoring  · Appreciate cardiology recommendations. · PT/OT  · Echocardiogram regarding aortic valve. Patient with history of bioprosthetic valve. · Reduced dosing of Neurontin to twice daily from 3 times daily. Hold Robaxin. Continue with supportive care. Pancreatic lesion  Assessment & Plan  · CT abdomen "Apparent hyperenhancing 1.1 cm pancreatic body lesion, with soft tissue prominence unchanged compared to 10/8/2012 with new true or pseudo hyperenhancement.  Findings can represent either an artifact or presence of a pancreatic neoplasm with low malignant potential such as neuroendocrine tumor"  · Tried to order MRI for further evaluation. I was reached out by radiology that this can be done as outpatient. Especially with patient's CKD and getting contrast study on admission. Will have this ordered as outpatient by PCP. discussed with patient and daughter. Pressure injury of deep tissue  Assessment & Plan  POA. Wound care consult    [address]  · Evaluated by trauma team.  Further evaluation, no reports of acute fractures were found. · Likely in the setting of UTI. · Orthostatic blood pressure was negative. · PT OT recommending short-term rehab however patient and family wants to go home with home physical therapy. History of stroke  Assessment & Plan  History noted. Outpatient follow-up  Continue Plavix. Hypertension  Assessment & Plan  · Continue with torsemide and metoprolol therapy. Patient also on Imdur. Family history of gout  Assessment & Plan  · Currently on allopurinol 150 daily    Type 2 diabetes mellitus ([address])  Assessment & Plan  · Sliding scale insulin. · Maintain blood glucose 140-180    Rheumatoid arthritis (HCC)  Assessment & Plan  · Prednisone 5 mg daily             VTE Pharmacologic Prophylaxis:   Moderate Risk (Score 3-4) - Pharmacological DVT Prophylaxis Ordered: heparin. Patient Centered Rounds: I performed bedside rounds with nursing staff today. Discussions with Specialists or Other Care Team Provider: CM    Education and Discussions with Family / Patient: Updated  (daughter) via phone. Total Time Spent on Date of Encounter in care of patient: 45 minutes This time was spent on one or more of the following: performing physical exam; counseling and coordination of care; obtaining or reviewing history; documenting in the medical record; reviewing/ordering tests, medications or procedures; communicating with other healthcare professionals and discussing with patient's family/caregivers.     Current Length of Stay: 3 day(s)  Current Patient Status: Inpatient   Certification Statement: The patient will continue to require additional inpatient hospital stay due to iv abx  Discharge Plan: Anticipate discharge tomorrow to home with home services. Code Status: Level 1 - Full Code    Subjective:   No new complaints. Doing well. Objective:     Vitals:   Temp (24hrs), Av °F (36.7 °C), Min:97.4 °F (36.3 °C), Max:98.6 °F (37 °C)    Temp:  [97.4 °F (36.3 °C)-98.6 °F (37 °C)] 97.4 °F (36.3 °C)  HR:  [59-64] 63  Resp:  [16-18] 16  BP: (148-161)/(72-76) 148/73  SpO2:  [95 %-97 %] 97 %  Body mass index is 32.32 kg/m². Input and Output Summary (last 24 hours): Intake/Output Summary (Last 24 hours) at 2023 1303  Last data filed at 2023 0601  Gross per 24 hour   Intake --   Output 2200 ml   Net -2200 ml       Physical Exam:   Physical Exam  Vitals and nursing note reviewed. Constitutional:       General: She is not in acute distress. Appearance: She is well-developed. HENT:      Head: Normocephalic and atraumatic. Eyes:      Conjunctiva/sclera: Conjunctivae normal.   Cardiovascular:      Rate and Rhythm: Normal rate and regular rhythm. Heart sounds: No murmur heard. Pulmonary:      Effort: Pulmonary effort is normal. No respiratory distress. Breath sounds: Normal breath sounds. Abdominal:      Palpations: Abdomen is soft. Tenderness: There is no abdominal tenderness. Musculoskeletal:         General: No swelling. Cervical back: Normal range of motion and neck supple. Skin:     General: Skin is warm and dry. Capillary Refill: Capillary refill takes less than 2 seconds. Neurological:      General: No focal deficit present. Mental Status: She is alert and oriented to person, place, and time.    Psychiatric:         Mood and Affect: Mood normal.         Behavior: Behavior normal.          Additional Data:     Labs:  Results from last 7 days   Lab Units 23  0432 09/06/23  0444 09/05/23  0527   WBC Thousand/uL 5.50   < > 5.73   HEMOGLOBIN g/dL 10.6*   < > 10.8*   HEMATOCRIT % 34.4*   < > 33.4*   PLATELETS Thousands/uL 136*   < > 113*   NEUTROS PCT %  --   --  57   LYMPHS PCT %  --   --  34   MONOS PCT %  --   --  5   EOS PCT %  --   --  2    < > = values in this interval not displayed. Results from last 7 days   Lab Units 09/07/23  0436 09/06/23  0444   SODIUM mmol/L 138 135   POTASSIUM mmol/L 4.1 4.0   CHLORIDE mmol/L 98 95*   CO2 mmol/L 32 28   BUN mg/dL 56* 60*   CREATININE mg/dL 1.55* 1.52*   ANION GAP mmol/L 8 12   CALCIUM mg/dL 8.7 7.9*   ALBUMIN g/dL  --  3.1*   TOTAL BILIRUBIN mg/dL  --  0.31   ALK PHOS U/L  --  53   ALT U/L  --  13   AST U/L  --  15   GLUCOSE RANDOM mg/dL 107 102     Results from last 7 days   Lab Units 09/04/23  1311   INR  0.97             Results from last 7 days   Lab Units 09/04/23  1548 09/04/23  1311   LACTIC ACID mmol/L 1.7 3.5*   PROCALCITONIN ng/ml  --  0.33*       Lines/Drains:  Invasive Devices     Peripheral Intravenous Line  Duration           Peripheral IV 09/04/23 Right Antecubital 2 days    Peripheral IV 09/04/23 Right;Ventral (anterior) Hand 2 days                      Imaging: No pertinent imaging reviewed. Recent Cultures (last 7 days):   Results from last 7 days   Lab Units 09/05/23  1358 09/04/23  1604   BLOOD CULTURE   --  No Growth at 48 hrs. No Growth at 48 hrs.    URINE CULTURE  40,000-49,000 cfu/ml  --        Last 24 Hours Medication List:   Current Facility-Administered Medications   Medication Dose Route Frequency Provider Last Rate   • albuterol  2 puff Inhalation Q6H PRN Jovanny Fiorea, DO     • allopurinol  150 mg Oral Daily Jovanny Fiorea, DO     • cefTRIAXone  1,000 mg Intravenous Q24H Deshaun Park MD     • clopidogrel  75 mg Oral Daily Jovanny Hatch, DO     • gabapentin  300 mg Oral TID Tushar Hartman DO     • heparin (porcine)  5,000 Units Subcutaneous [facility] Diandra Turner MD     • HYDROcodone-acetaminophen  1 tablet Oral Q6H PRN Tushar Banai, DO     • isosorbide mononitrate  30 mg Oral Daily Hetul Hatch, DO     • levothyroxine  100 mcg Oral Daily Deshaun Park MD     • lidocaine  1 patch Topical Daily Tushar Banai, DO     • methocarbamol  500 mg Oral Q6H PRN Tushar Banai, DO     • metoprolol tartrate  25 mg Oral Q12H [facility] Hetnallely Fiorea, DO     • ondansetron  4 mg Intravenous Q6H PRN Hetnallely Fiorea, DO     • predniSONE  10 mg Oral Daily Tushar Banai, DO     • sertraline  25 mg Oral Daily Hetul Hatch, DO     • torsemide  20 mg Oral BID Jovanny Hatch, DO          Today, Patient Was Seen By: Mariza Snow MD    **Please Note: This note may have been constructed using a voice recognition system. **

## 2023-09-07 NOTE — ASSESSMENT & PLAN NOTE
· Evaluated by trauma team.  Further evaluation, no reports of acute fractures were found. · Likely in the setting of UTI. · Orthostatic blood pressure was negative. · PT OT recommending short-term rehab however patient and family wants to go home with home physical therapy.

## 2023-09-07 NOTE — ASSESSMENT & PLAN NOTE
· Patient does acknowledge symptoms of urinary frequency/burning with mild suprapubic discomfort. However granddaughter reports that she has a history of urinary tract infection and urinary incontinence. Patient presents with low-grade temperature. · Continue with empiric antibiotic therapy pending final culture results. Unfortunately UA and cultures were obtained after patient receiving 1 dose of ceftriaxone. Reviewed previous urine cultures susceptibilities and transition to cefepime. · Urine culture showing 40k-50k mixed contaminant. Considering patient received antibiotics prior to obtaining urine cultures. Will finish empiric 3 days course of antibiotics. Will transition back to ceftriaxone for 1 more dose. · Follow-up on blood cultures.   Remains negative so far

## 2023-09-08 VITALS
WEIGHT: 160 LBS | DIASTOLIC BLOOD PRESSURE: 69 MMHG | RESPIRATION RATE: 16 BRPM | BODY MASS INDEX: 32.25 KG/M2 | SYSTOLIC BLOOD PRESSURE: 165 MMHG | HEART RATE: 56 BPM | TEMPERATURE: 98.1 F | HEIGHT: 59 IN | OXYGEN SATURATION: 95 %

## 2023-09-08 PROCEDURE — 97116 GAIT TRAINING THERAPY: CPT

## 2023-09-08 PROCEDURE — 97110 THERAPEUTIC EXERCISES: CPT

## 2023-09-08 PROCEDURE — 97535 SELF CARE MNGMENT TRAINING: CPT

## 2023-09-08 PROCEDURE — 99239 HOSP IP/OBS DSCHRG MGMT >30: CPT | Performed by: INTERNAL MEDICINE

## 2023-09-08 RX ORDER — LOPERAMIDE HYDROCHLORIDE 2 MG/1
2 TABLET ORAL 3 TIMES DAILY PRN
Qty: 12 TABLET | Refills: 0 | Status: SHIPPED | OUTPATIENT
Start: 2023-09-08

## 2023-09-08 RX ORDER — LEVOTHYROXINE SODIUM 0.1 MG/1
100 TABLET ORAL DAILY
Qty: 30 TABLET | Refills: 0 | Status: SHIPPED | OUTPATIENT
Start: 2023-09-08

## 2023-09-08 RX ORDER — ALLOPURINOL 300 MG/1
150 TABLET ORAL DAILY
Refills: 0
Start: 2023-09-08

## 2023-09-08 RX ORDER — GABAPENTIN 300 MG/1
300 CAPSULE ORAL 2 TIMES DAILY
Refills: 0
Start: 2023-09-08

## 2023-09-08 RX ADMIN — ISOSORBIDE MONONITRATE 30 MG: 30 TABLET, EXTENDED RELEASE ORAL at 08:37

## 2023-09-08 RX ADMIN — SERTRALINE 25 MG: 25 TABLET, FILM COATED ORAL at 08:37

## 2023-09-08 RX ADMIN — TORSEMIDE 20 MG: 20 TABLET ORAL at 08:38

## 2023-09-08 RX ADMIN — PREDNISONE 10 MG: 10 TABLET ORAL at 08:37

## 2023-09-08 RX ADMIN — GABAPENTIN 300 MG: 300 CAPSULE ORAL at 08:37

## 2023-09-08 RX ADMIN — ALLOPURINOL 150 MG: 300 TABLET ORAL at 08:38

## 2023-09-08 RX ADMIN — LIDOCAINE 1 PATCH: 700 PATCH TOPICAL at 08:39

## 2023-09-08 RX ADMIN — HEPARIN SODIUM 5000 UNITS: 5000 INJECTION INTRAVENOUS; SUBCUTANEOUS at 06:32

## 2023-09-08 RX ADMIN — CLOPIDOGREL BISULFATE 75 MG: 75 TABLET, FILM COATED ORAL at 08:37

## 2023-09-08 RX ADMIN — METOPROLOL TARTRATE 25 MG: 25 TABLET, FILM COATED ORAL at 08:37

## 2023-09-08 RX ADMIN — LEVOTHYROXINE SODIUM 100 MCG: 100 TABLET ORAL at 06:33

## 2023-09-08 NOTE — ASSESSMENT & PLAN NOTE
· CT abdomen "Apparent hyperenhancing 1.1 cm pancreatic body lesion, with soft tissue prominence unchanged compared to 10/8/2012 with new true or pseudo hyperenhancement. Findings can represent either an artifact or presence of a pancreatic neoplasm with low malignant potential such as neuroendocrine tumor"  · Tried to order MRI for further evaluation. I was reached out by radiology that this can be done as outpatient. Especially with patient's CKD and getting contrast study on admission. Will have this ordered as outpatient by PCP. discussed with patient and daughter and they are agreeable.

## 2023-09-08 NOTE — INCIDENTAL FINDINGS
The following findings require follow up:  Radiographic finding     Apparent hyperenhancing 1.1 cm pancreatic body lesion, with soft tissue prominence unchanged compared to 10/8/2012 with new true or pseudo hyperenhancement. Findings can represent either an artifact or presence of a pancreatic neoplasm with low malignant   potential such as neuroendocrine tumor. MRI abdomen with and without contrast is recommended for further assessment.      Follow up should be done within 1 month     Please notify the following clinician to assist with the follow up:  Shanna Hicks DO

## 2023-09-08 NOTE — DISCHARGE SUMMARY
[address] [facility]  Discharge- Fernanda Lanes 1934, 80 y.o. female MRN: [id number]  Unit/Bed#: [facility] 701-01 Encounter: [id number]  Primary Care Provider: June Mathews DO   Date and time admitted to hospital: 9/4/2023 12:53 PM    * Acute cystitis without hematuria  Assessment & Plan  · Patient does acknowledge symptoms of urinary frequency/burning with mild suprapubic discomfort. However granddaughter reports that she has a history of urinary tract infection and urinary incontinence. Patient presents with low-grade temperature. · Status post 3 days course of IV cephalosporins. .  · Urine culture showing 40k-50k mixed contaminant. However patient received IV ceftriaxone prior to urine cultures being obtained. · Cultures remains negative. · Urinary symptoms have resolved. Pancreatic lesion  Assessment & Plan  · CT abdomen "Apparent hyperenhancing 1.1 cm pancreatic body lesion, with soft tissue prominence unchanged compared to 10/8/2012 with new true or pseudo hyperenhancement. Findings can represent either an artifact or presence of a pancreatic neoplasm with low malignant potential such as neuroendocrine tumor"  · Tried to order MRI for further evaluation. I was reached out by radiology that this can be done as outpatient. Especially with patient's CKD and getting contrast study on admission. Will have this ordered as outpatient by PCP. discussed with patient and daughter and they are agreeable. Pressure injury of deep tissue  Assessment & Plan  POA. Follow-up with PCP for wound management. Fall  Assessment & Plan  · Evaluated by trauma team.  Further evaluation, no reports of acute fractures were found. · Likely in the setting of UTI. · Orthostatic blood pressure was negative. · PT OT recommending short-term rehab however patient and family wants to go home with home physical therapy. History of stroke  Assessment & Plan  History noted.  Outpatient follow-up  Continue Plavix. Hypertension  Assessment & Plan  · Continue with torsemide and metoprolol therapy. Patient also on Imdur. Family history of gout  Assessment & Plan  · Currently on allopurinol 150 daily    Type 2 diabetes mellitus ([address])  Assessment & Plan  · Follow-up with PCP    Rheumatoid arthritis ([address])  Assessment & Plan  · Continue home dose prednisone. Medical Problems     Resolved Problems  Date Reviewed: 9/8/2023          Resolved    Near syncope 9/7/2023     Resolved by  Katharine Jordan MD        Discharging Physician / Practitioner: Katharine Jordan MD  PCP: Hanna Quevedo DO  Admission Date:   Admission Orders (From admission, onward)     Ordered        09/04/23 1705  Inpatient Admission  Once            09/04/23 1433  Inpatient Admission  Once,   Status:  Canceled                      Discharge Date: 09/08/23    Consultations During Hospital Stay:  · Trauma    Procedures Performed:   · None    Significant Findings / Test Results:   XR chest 1 view    Result Date: 9/7/2023  Impression: No acute cardiopulmonary disease within limitations of supine imaging. Workstation performed: [id number]     XR shoulder 2+ vw left    Result Date: 9/5/2023  Impression: No acute osseous abnormality. Degenerative changes as described. Workstation performed: [id number]     TRAUMA - CT spine cervical wo contrast    Result Date: 9/4/2023  Impression: No new cervical spine fracture or traumatic malalignment. Stable chronic mildly displaced fracture of the left lateral mass of C2 vertebral body, compared to 5/19/2022. Posterior fusion at C1-C2 level with moderate degenerative changes and mild to moderate spinal canal stenosis and likely neuroforaminal narrowing at C2-C3 level Small biapical pulmonary nodules measuring up to 2 mm. Based on current Fleischner Society 2017 Guidelines on incidental pulmonary nodule, no routine follow-up is needed if the patient is low risk.   If the patient is high risk, optional follow-up chest CT at 12 months can be considered. I personally discussed this study with Ruma Bowers on 9/4/2023 2:35 PM. Workstation performed: [id number]     TRAUMA - CT head wo contrast    Result Date: 9/4/2023  Impression: No acute intracranial abnormality. Stable chronic microvascular ischemic changes and lacunar infarcts as described above. I personally discussed this study with Ruma Bowers on 9/4/2023 2:35 PM. Workstation performed: [id number]     CT chest abdomen pelvis w contrast    Result Date: 9/4/2023  Impression: No acute traumatic injury of the chest, abdomen and pelvis. Apparent hyperenhancing 1.1 cm pancreatic body lesion, with soft tissue prominence unchanged compared to 10/8/2012 with new true or pseudo hyperenhancement. Findings can represent either an artifact or presence of a pancreatic neoplasm with low malignant  potential such as neuroendocrine tumor. MRI abdomen with and without contrast is recommended for further assessment. Small bilateral pulmonary nodules. based on current Fleischner Society 2017 Guidelines on incidental pulmonary nodule, no routine follow-up is needed if the patient is low risk. If the patient is high risk, optional follow-up chest CT at 12 months can be considered. 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 I personally discussed this study with Ruma Bowers on 9/4/2023 2:35 PM. Workstation performed: [id number]     XR Trauma multiple ([facility] trauma bay ONLY)    Result Date: 9/4/2023  Impression: No acute cardiopulmonary disease within limitations of supine imaging. Workstation performed: [id number]   ·     Incidental Findings:   · Pancreatic lesion as above  · I reviewed the above mentioned incidental findings with the patient and/or family and they expressed understanding. Test Results Pending at Discharge (will require follow up):    · None     Outpatient Tests Requested:  · None    Complications: None    Reason for Admission: Near syncope    Hospital Course:   Annette Molina is a 80 y.o. female patient who originally presented to the hospital on 9/4/2023 due to fall and near syncope. Trauma team evaluated the patient and reported no acute fractures on the work-up. Her symptoms was likely due to urinary tract infection as noted by her urinary symptoms and abnormal UA. She received 3 days course of IV cephalosporins. Urine cultures was unrevealing however patient received antibiotics prior to obtaining the urinalysis. Patient was eval by physical therapy and recommended for short-term rehab however patient and family elected to go home with home physical therapy. Patient is feeling well and has been ambulating. 1 episode of diarrhea reported yesterday which since has resolved. Patient is feeling well and ready to go home. She has no further urinary symptoms and offers no new complaints. No further inpatient needs, patient stable for discharge        Please see above list of diagnoses and related plan for additional information. Condition at Discharge: good    Discharge Day Visit / Exam:   Subjective: No acute events. Patient offers no complaints today. Wants to go home. Vitals: Blood Pressure: 165/69 (09/08/23 0801)  Pulse: 56 (09/08/23 0801)  Temperature: 98.1 °F (36.7 °C) (09/08/23 0801)  Temp Source: Oral (09/04/23 1931)  Respirations: 16 (09/07/23 1437)  Height: 4' 11" (149.9 cm) (09/05/23 0933)  Weight - Scale: 72.6 kg (160 lb) (09/05/23 0933)  SpO2: 95 % (09/08/23 0801)  Exam:   Physical Exam  Vitals and nursing note reviewed. Constitutional:       General: She is not in acute distress. Appearance: She is well-developed. HENT:      Head: Normocephalic and atraumatic. Eyes:      Conjunctiva/sclera: Conjunctivae normal.   Cardiovascular:      Rate and Rhythm: Normal rate and regular rhythm. Heart sounds: No murmur heard.   Pulmonary:      Effort: Pulmonary effort is normal. No respiratory distress. Breath sounds: Normal breath sounds. Abdominal:      Palpations: Abdomen is soft. Tenderness: There is no abdominal tenderness. Musculoskeletal:         General: No swelling. Cervical back: Neck supple. Skin:     General: Skin is warm and dry. Capillary Refill: Capillary refill takes less than 2 seconds. Neurological:      General: No focal deficit present. Mental Status: She is alert and oriented to person, place, and time. Psychiatric:         Mood and Affect: Mood normal.         Behavior: Behavior normal.          Discussion with Family: Updated  (daughter) via phone. Discharge instructions/Information to patient and family:   See after visit summary for information provided to patient and family. Provisions for Follow-Up Care:  See after visit summary for information related to follow-up care and any pertinent home health orders. Disposition:   Home with VNA Services (Reminder: Complete face to face encounter)    Planned Readmission: no     Discharge Statement:  I spent 41 minutes discharging the patient. This time was spent on the day of discharge. I had direct contact with the patient on the day of discharge. Greater than 50% of the total time was spent examining patient, answering all patient questions, arranging and discussing plan of care with patient as well as directly providing post-discharge instructions. Additional time then spent on discharge activities. Discharge Medications:  See after visit summary for reconciled discharge medications provided to patient and/or family.       **Please Note: This note may have been constructed using a voice recognition system**

## 2023-09-08 NOTE — ASSESSMENT & PLAN NOTE
· Patient does acknowledge symptoms of urinary frequency/burning with mild suprapubic discomfort. However granddaughter reports that she has a history of urinary tract infection and urinary incontinence. Patient presents with low-grade temperature. · Status post 3 days course of IV cephalosporins. .  · Urine culture showing 40k-50k mixed contaminant. However patient received IV ceftriaxone prior to urine cultures being obtained. · Cultures remains negative. · Urinary symptoms have resolved.

## 2023-09-08 NOTE — PLAN OF CARE
Problem: PHYSICAL THERAPY ADULT  Goal: Performs mobility at highest level of function for planned discharge setting. See evaluation for individualized goals. Description: Treatment/Interventions: Functional transfer training, LE strengthening/ROM, Therapeutic exercise, Endurance training, Cognitive reorientation, Patient/family training, Equipment eval/education, Bed mobility, Gait training, Spoke to nursing, Spoke to case management, OT, Family, Spoke to advanced practitioner  Equipment Recommended: Mariajose Nieves, Wheelchair       See flowsheet documentation for full assessment, interventions and recommendations. Outcome: Progressing  Note: Prognosis: Fair  Problem List: Decreased strength, Decreased endurance, Impaired balance, Decreased mobility, Decreased coordination, Decreased range of motion, Decreased skin integrity, Obesity, Pain  Assessment: Pt seen for PT treatment session this date. Therapy session focused on functional transfers, gait training, therex and endurance training in order to improve overall mobility and independence. Pt requires assist of 1 for all mobility performed this date. Pt requiring mod A from lower surface toilet- recommend BSC use to ease transfer. Pt ambulating increased distances with min A; increased cuing for sequencing/ posture + safety. LLE weakness noted compared to R. Pt performed/ tolerated seated therex without complaints. Pt making progress toward goals. Pt was left sitting OOB in recliner at the end of PT session with all needs in reach. Pt would benefit from continued PT services while in hospital to address remaining limitations. PT to continue to follow pt and recommends STR. The patient's AM-PAC Basic Mobility Inpatient Short Form Raw Score is 14. A Raw score of less than or equal to 16 suggests the patient may benefit from discharge to post-acute rehabilitation services.  Please also refer to the recommendation of the Physical Therapist for safe discharge planning. Barriers to Discharge: Decreased caregiver support, Inaccessible home environment     PT Discharge Recommendation: Post acute rehabilitation services (if family and patient desire home DC, recommend HHPT)    See flowsheet documentation for full assessment.

## 2023-09-08 NOTE — OCCUPATIONAL THERAPY NOTE
Occupational Therapy Progress Note     Patient Name: Edgar De Luna  Today's Date: 9/8/2023  Problem List  Principal Problem:    Acute cystitis without hematuria  Active Problems:    Rheumatoid arthritis ([address])    Type 2 diabetes mellitus ([address])    Family history of gout    Hypertension    History of stroke    Fall    Pressure injury of deep tissue    Pancreatic lesion        09/08/23 0915   OT Last Visit   OT Visit Date 09/08/23   Note Type   Note Type Treatment   Pain Assessment   Pain Assessment Tool 0-10   Pain Score 4   Pain Location/Orientation Orientation: Left; Location: [facility]   Hospital Pain Intervention(s) Repositioned; Ambulation/increased activity; Emotional support; Rest   Restrictions/Precautions   Weight Bearing Precautions Per Order No   Braces or Orthoses C/S Collar   Other Precautions Cognitive; Fall Risk;Pain;Telemetry;Hard of hearing   Lifestyle   Autonomy I with ADL's, assistance with IaDL's, +RW with functional mobility +sleeps in a sit to stand recliner   Reciprocal Relationships family -daugter, QUANG, grandchildren -pt's daughter present during evaluation and reports pt never alone someone home to assist if needed. Service to Others retired   Intrinsic Gratification watching [address] 5  Supervision/Setup   Grooming Deficit Wash/dry hands   [address]  1  Total Assistance   Toileting Deficit Perineal hygiene  (pt reports "my arms are too short I cant reach")   Transfers   Sit to Stand 4  Minimal assistance   Additional items Assist x 1   Stand to Sit 4  Minimal assistance   Additional items Assist x 1   Toilet transfer 3  Moderate assistance   Additional items Assist x 1;Standard toilet   Additional Comments +RW and cues for safety/insight into deficits. Functional Mobility   Functional Mobility 4  Minimal assistance   Additional Comments min a x 1 with RW short distance functional mobility, fatigues easily, impaired activity tolerance.    Additional items Rolling walker   Toilet Transfers   Toilet Transfer From Rolling walker   Toilet Transfer Type (from sit to stand with mod a 2* low surface)   Toilet Transfer to Standard toilet   Toilet Transfer Technique (sit to stand)   Toilet Transfers Moderate assistance   Cognition   Overall Cognitive Status Impaired   Arousal/Participation Alert; Responsive; Cooperative   Attention Attends with cues to redirect   Orientation Level Oriented X4   Memory Decreased recall of precautions;Decreased recall of recent events;Decreased short term memory   Following Commands Follows one step commands without difficulty   Comments pt motivated for therapy oriented, impaired mental flexibility/rigid thinking with decreased insight into deficits, complex problem sovling deficits and impaired safety awareness. Per daughter pt has supportive family whom is always home to assist as needed. Activity Tolerance   Activity Tolerance Patient limited by fatigue   Medical Staff Made Aware RN cleared pt for therapy   Assessment   Assessment Patient participated in Skilled OT session this date with interventions consisting of self care tasks, functional transfers/functional mobility, left UE HEP with shoulder, elbow flex/ext exercises . Patient agreeable to OT treatment session, upon arrival patient was found OOB on toilet. In comparison to previous session, patient with improvements in transfers . Patient requiring verbal cues for safety, verbal cues for correct technique, one step directives and frequent rest periods. Patient continues to be functioning below baseline level, occupational performance remains limited secondary to factors listed above and increased risk for falls and injury. The patient's raw score on the AM-PAC Daily Activity Inpatient Short Form is 14. A raw score of less than 19 suggests the patient may benefit from discharge to post-acute rehabilitation services.  Please refer to the recommendation of the Occupational Therapist for safe discharge planning. From OT standpoint, recommendation at time of d/c would be Short Term Rehab. Patient to benefit from continued Occupational Therapy treatment while in the hospital to address deficits as defined above and maximize level of functional independence with ADLs and functional mobility. Plan   Treatment Interventions ADL retraining;Functional transfer training; Endurance training;Patient/family training;Equipment evaluation/education; Activityengagement; Energy conservation;Continued evaluation; Compensatory technique education   Goal Expiration Date 09/19/23   OT Treatment Day 1   OT Frequency 2-3x/wk   Recommendation   OT Discharge Recommendation Post acute rehabilitation services   AM-PAC Daily Activity Inpatient   Lower Body Dressing 2   Bathing 2   Toileting 1   Upper Body Dressing 2   Grooming 3   Eating 4   Daily Activity Raw Score 14   Daily Activity Standardized Score (Calc for Raw Score >=11) 33.39   AM-PAC Applied Cognition Inpatient   Following a Speech/Presentation 2   Understanding Ordinary Conversation 4   Taking Medications 2   Remembering Where Things Are Placed or Put Away 2   Remembering List of 4-5 Errands 2   Taking Care of Complicated Tasks 1   Applied Cognition Raw Score 13   Applied Cognition Standardized Score 30.46   End of Consult   Patient Position at End of Consult All needs within reach;Bed/Chair alarm activated; Bedside chair   Nurse Communication Nurse aware of consult     Eliseo GARCÍA, OTR/L

## 2023-09-08 NOTE — CASE MANAGEMENT
Case Management Discharge Planning Note    Patient name Reji Nelson  Location [address] [address]/[facility] [address]87-30 MRN [id number]  : 1934 Date 2023       Current Admission Date: 2023  Current Admission Diagnosis:Acute cystitis without hematuria   Patient Active Problem List    Diagnosis Date Noted   • Fall 2023   • Pressure injury of deep tissue 2023   • Pancreatic lesion 2023   • Acute cystitis without hematuria 2023   • Rheumatoid arthritis ([address] [facility]) 2023   • Type 2 diabetes mellitus ([address] [facility]) 2023   • History of renal artery stenosis 2023   • Family history of gout 2023   • Peripheral polyneuropathy 2023   • Hypertension 2023   • Lupus ([address] [facility]) 2023   • Hyperlipidemia 2023   • History of stroke 2023      LOS (days): 4  Geometric Mean LOS (GMLOS) (days): 2.80  Days to GMLOS:-1     OBJECTIVE:  Risk of Unplanned Readmission Score: 15.4         Current admission status: Inpatient   Preferred Pharmacy:   [address] [place], [place] [address] [address]  [address] [facility] [address]  Phone: [phone] Fax: [phone]    Primary Care Provider: Anh Kellogg DO    Primary Insurance: Shira Needs REP  Secondary Insurance:     DISCHARGE DETAILS:  This CM met with pt at bedside. She stated she has a wheeled walker in her home, she got it her last admit. Jose Rea is booked in [address] Anticipated DC home today.  TBD

## 2023-09-08 NOTE — PLAN OF CARE
Problem: OCCUPATIONAL THERAPY ADULT  Goal: Performs self-care activities at highest level of function for planned discharge setting. See evaluation for individualized goals. Description: Treatment Interventions: ADL retraining, Functional transfer training, UE strengthening/ROM, Endurance training, Cognitive reorientation, Patient/family training, Equipment evaluation/education, Compensatory technique education, Energy conservation          See flowsheet documentation for full assessment, interventions and recommendations. Note: Limitation: Decreased ADL status, Decreased Safe judgement during ADL, Decreased cognition, Decreased self-care trans, Decreased high-level ADLs, Decreased endurance, Decreased UE strength, Decreased UE ROM  Prognosis: Fair  Assessment: Patient participated in Skilled OT session this date with interventions consisting of self care tasks, functional transfers/functional mobility . Patient agreeable to OT treatment session, upon arrival patient was found OOB on toilet. In comparison to previous session, patient with improvements in transfers . Patient requiring verbal cues for safety, verbal cues for correct technique, one step directives and frequent rest periods. Patient continues to be functioning below baseline level, occupational performance remains limited secondary to factors listed above and increased risk for falls and injury. The patient's raw score on the -PAC Daily Activity Inpatient Short Form is 14. A raw score of less than 19 suggests the patient may benefit from discharge to post-acute rehabilitation services. Please refer to the recommendation of the Occupational Therapist for safe discharge planning. From OT standpoint, recommendation at time of d/c would be Short Term Rehab.    Patient to benefit from continued Occupational Therapy treatment while in the hospital to address deficits as defined above and maximize level of functional independence with ADLs and functional mobility.      OT Discharge Recommendation: Post acute rehabilitation services

## 2023-09-08 NOTE — PHYSICAL THERAPY NOTE
PHYSICAL THERAPY NOTE          Patient Name: Mireille Levin  Today's Date: 9/8/2023 09/08/23 0945   PT Last Visit   PT Visit Date 09/08/23   Note Type   Note Type Treatment   Pain Assessment   Pain Assessment Tool 0-10   Pain Score 4   Pain Location/Orientation Orientation: Left; Location: [facility] Pain Intervention(s) Repositioned; Ambulation/increased activity; Emotional support   Restrictions/Precautions   Weight Bearing Precautions Per Order No   Braces or Orthoses C/S Collar  (can be weaned per recent neurosx note; d/c orders)   Other Precautions Cognitive; Chair Alarm; Bed Alarm;Multiple lines; Fall Risk;Pain;Spinal precautions   General   Chart Reviewed Yes   Response to Previous Treatment Patient with no complaints from previous session. Family/Caregiver Present No   Cognition   Overall Cognitive Status Impaired   Arousal/Participation Alert; Responsive; Cooperative   Attention Attends with cues to redirect   Orientation Level Oriented X4   Memory Decreased recall of precautions;Decreased recall of recent events   Following Commands Follows one step commands without difficulty   Comments pt pleasant and cooperative. decreased safety awareness + insight, resistant at times to therapist instruction/ education- pt has particular way of completing tasks   Bed Mobility   Supine to Sit Unable to assess   Sit to Supine Unable to assess   Transfers   Sit to Stand 4  Minimal assistance   Additional items Assist x 1; Armrests; Increased time required   Stand to Sit 4  Minimal assistance   Additional items Assist x 1; Increased time required;Verbal cues   Toilet transfer 3  Moderate assistance   Additional items Assist x 1; Increased time required;Standard toilet   Additional Comments + RW; cues for hand placement and sequencing  (4x STS throughout session)   Ambulation/Elevation   Gait pattern Excessively slow; Short stride; Foward flexed;Decreased foot clearance; Improper Weight shift   Gait Assistance 4  Minimal assist   Additional items Assist x 1;Verbal cues   Assistive Device Rolling walker   Distance 20ft   Ambulation/Elevation Additional Comments very slow gait speed   Balance   Static Sitting Fair   Dynamic Sitting Fair   Static Standing Fair -   Dynamic Standing Poor +   Ambulatory Poor +   Endurance Deficit   Endurance Deficit Yes   Activity Tolerance   Activity Tolerance Patient tolerated treatment well   Medical Staff Made Aware OT Abby; partial co-session completed this date 2* increased medical complexity and multiple co-morbidities   Nurse Made Aware RN cleared pt to participate in therapy session   Exercises   Knee AROM Long Arc Quad Sitting;Bilateral;10 reps;AROM   Ankle Pumps Sitting;Bilateral;10 reps;AROM   Marching Sitting;Bilateral;10 reps;AROM   Assessment   Prognosis Fair   Problem List Decreased strength;Decreased endurance; Impaired balance;Decreased mobility; Decreased coordination;Decreased range of motion;Decreased skin integrity;Obesity;Pain   Assessment Pt seen for PT treatment session this date. Therapy session focused on functional transfers, gait training, therex and endurance training in order to improve overall mobility and independence. Pt requires assist of 1 for all mobility performed this date. Pt requiring mod A from lower surface toilet- recommend BSC use to ease transfer. Pt ambulating increased distances with min A; increased cuing for sequencing/ posture + safety. LLE weakness noted compared to R. Pt performed/ tolerated seated therex without complaints. Pt making progress toward goals. Pt was left sitting OOB in recliner at the end of PT session with all needs in reach. Pt would benefit from continued PT services while in hospital to address remaining limitations. PT to continue to follow pt and recommends STR. The patient's AM-PAC Basic Mobility Inpatient Short Form Raw Score is 14.  A Raw score of less than or equal to 16 suggests the patient may benefit from discharge to post-acute rehabilitation services. Please also refer to the recommendation of the Physical Therapist for safe discharge planning. Barriers to Discharge Decreased caregiver support; Inaccessible home environment   Goals   Patient Goals to go home   STG Expiration Date 09/19/23   PT Treatment Day 1   Plan   Treatment/Interventions Functional transfer training;LE strengthening/ROM; Therapeutic exercise; Endurance training;Patient/family training;Equipment eval/education; Bed mobility;Gait training;Spoke to nursing;Spoke to case management   Progress Progressing toward goals   PT Frequency 2-3x/wk   Recommendation   PT Discharge Recommendation Post acute rehabilitation services  (if family and patient desire home DC, recommend HHPT)   Equipment Recommended Walker; Wheelchair   Wheelchair Package Recommended Standard   Walker Package Recommended Wheeled walker   AM-PAC Basic Mobility Inpatient   Turning in [facility] Without Bedrails 2   Lying on Back to Sitting on Edge of Flat Bed Without Bedrails 2   Moving Bed to Chair 3   Standing Up From Chair Using Arms 3   Walk in Room 3   Climb 3-5 Stairs With Railing 1   Basic Mobility Inpatient Raw Score 14   Basic Mobility Standardized Score 35.55   Highest Level Of Mobility   JH-HLM Goal 4: Move to chair/commode   JH-HLM Achieved 6: Walk 10 steps or more   Education   Education Provided Mobility training;Assistive device   Patient Demonstrates acceptance/verbal understanding   End of Consult   Patient Position at End of Consult Bedside chair; All needs within reach;Bed/Chair alarm activated     Joel Ruiz, PT, DPT

## 2023-09-08 NOTE — PLAN OF CARE
Problem: Prexisting or High Potential for Compromised Skin Integrity  Goal: Skin integrity is maintained or improved  Description: INTERVENTIONS:  - Identify patients at risk for skin breakdown  - Assess and monitor skin integrity  - Assess and monitor nutrition and hydration status  - Monitor labs   - Assess for incontinence   - Turn and reposition patient  - Assist with mobility/ambulation  - Relieve pressure over bony prominences  - Avoid friction and shearing  - Provide appropriate hygiene as needed including keeping skin clean and dry  - Evaluate need for skin moisturizer/barrier cream  - Collaborate with interdisciplinary team   - Patient/family teaching  - Consider wound care consult   Outcome: Progressing     Problem: MOBILITY - ADULT  Goal: Maintain or return to baseline ADL function  Description: INTERVENTIONS:  -  Assess patient's ability to carry out ADLs; assess patient's baseline for ADL function and identify physical deficits which impact ability to perform ADLs (bathing, care of mouth/teeth, toileting, grooming, dressing, etc.)  - Assess/evaluate cause of self-care deficits   - Assess range of motion  - Assess patient's mobility; develop plan if impaired  - Assess patient's need for assistive devices and provide as appropriate  - Encourage maximum independence but intervene and supervise when necessary  - Involve family in performance of ADLs  - Assess for home care needs following discharge   - Consider OT consult to assist with ADL evaluation and planning for discharge  - Provide patient education as appropriate  Outcome: Progressing  Goal: Maintains/Returns to pre admission functional level  Description: INTERVENTIONS:  - Perform BMAT or MOVE assessment daily.   - Set and communicate daily mobility goal to care team and patient/family/caregiver. - Collaborate with rehabilitation services on mobility goals if consulted  - Perform Range of Motion 3 times a day.   - Reposition patient every 2 hours.  - Dangle patient 3 times a day  - Stand patient 3 times a day  - Ambulate patient 3 times a day  - Out of bed to chair 3 times a day   - Out of bed for meals 3 times a day  - Out of bed for toileting  - Record patient progress and toleration of activity level   Outcome: Progressing     Problem: CARDIOVASCULAR - ADULT  Goal: Maintains optimal cardiac output and hemodynamic stability  Description: INTERVENTIONS:  - Monitor I/O, vital signs and rhythm  - Monitor for S/S and trends of decreased cardiac output  - Administer and titrate ordered vasoactive medications to optimize hemodynamic stability  - Assess quality of pulses, skin color and temperature  - Assess for signs of decreased coronary artery perfusion  - Instruct patient to report change in severity of symptoms  Outcome: Progressing  Goal: Absence of cardiac dysrhythmias or at baseline rhythm  Description: INTERVENTIONS:  - Continuous cardiac monitoring, vital signs, obtain 12 lead EKG if ordered  - Administer antiarrhythmic and heart rate control medications as ordered  - Monitor electrolytes and administer replacement therapy as ordered  Outcome: Progressing     Problem: GENITOURINARY - ADULT  Goal: Maintains or returns to baseline urinary function  Description: INTERVENTIONS:  - Assess urinary function  - Encourage oral fluids to ensure adequate hydration if ordered  - Administer IV fluids as ordered to ensure adequate hydration  - Administer ordered medications as needed  - Offer frequent toileting  - Follow urinary retention protocol if ordered  Outcome: Progressing  Goal: Absence of urinary retention  Description: INTERVENTIONS:  - Assess patient’s ability to void and empty bladder  - Monitor I/O  - Bladder scan as needed  - Discuss with physician/AP medications to alleviate retention as needed  - Discuss catheterization for long term situations as appropriate  Outcome: Progressing  Goal: Urinary catheter remains patent  Description: INTERVENTIONS:  - Assess patency of urinary catheter  - If patient has a chronic coelho, consider changing catheter if non-functioning  - Follow guidelines for intermittent irrigation of non-functioning urinary catheter  Outcome: Progressing     Problem: SKIN/TISSUE INTEGRITY - ADULT  Goal: Skin Integrity remains intact(Skin Breakdown Prevention)  Description: Assess:  -Perform Marito assessment every shift   -Clean and moisturize skin every shift   -Inspect skin when repositioning, toileting, and assisting with ADLS  -Assess extremities for adequate circulation and sensation     Bed Management:  -Have minimal linens on bed & keep smooth, unwrinkled  -Change linens as needed when moist or perspiring  -Avoid sitting or lying in one position for more than 2 hours while in bed  -Keep HOB at 45 degrees     Toileting:  -Offer bedside commode  -Assess for incontinence every 2 HOURS   -Use incontinent care products after each incontinent episode such as orange cream/blue cream     Activity:  -Mobilize patient 3 times a day  -Encourage activity and walks on unit  -Encourage or provide ROM exercises   -Turn and reposition patient every 2 Hours  -Use appropriate equipment to lift or move patient in bed  -Instruct/ Assist with weight shifting every 2 when out of bed in chair  -Consider limitation of chair time 2 hour intervals    Skin Care:  -Avoid use of baby powder, tape, friction and shearing, hot water or constrictive clothing  -Relieve pressure over bony prominences using allevyns   -Do not massage red bony areas    Next Steps:  -Teach patient strategies to minimize risks such as pressure injuries    -Consider consults to  interdisciplinary teams such as PT/OT  Outcome: Progressing  Goal: Incision(s), wounds(s) or drain site(s) healing without S/S of infection  Description: INTERVENTIONS  - Assess and document dressing, incision, wound bed, drain sites and surrounding tissue  - Provide patient and family education  - Perform skin care/dressing changes every shift  Outcome: Progressing  Problem: PAIN - ADULT  Goal: Verbalizes/displays adequate comfort level or baseline comfort level  Description: Interventions:  - Encourage patient to monitor pain and request assistance  - Assess pain using appropriate pain scale  - Administer analgesics based on type and severity of pain and evaluate response  - Implement non-pharmacological measures as appropriate and evaluate response  - Consider cultural and social influences on pain and pain management  - Notify physician/advanced practitioner if interventions unsuccessful or patient reports new pain  Outcome: Progressing     Problem: Nutrition/Hydration-ADULT  Goal: Nutrient/Hydration intake appropriate for improving, restoring or maintaining nutritional needs  Description: Monitor and assess patient's nutrition/hydration status for malnutrition. Collaborate with interdisciplinary team and initiate plan and interventions as ordered. Monitor patient's weight and dietary intake as ordered or per policy. Utilize nutrition screening tool and intervene as necessary. Determine patient's food preferences and provide high-protein, high-caloric foods as appropriate.      INTERVENTIONS:  - Monitor oral intake, urinary output, labs, and treatment plans  - Assess nutrition and hydration status and recommend course of action  - Evaluate amount of meals eaten  - Assist patient with eating if necessary   - Allow adequate time for meals  - Recommend/ encourage appropriate diets, oral nutritional supplements, and vitamin/mineral supplements  - Order, calculate, and assess calorie counts as needed  - Recommend, monitor, and adjust tube feedings and TPN/PPN based on assessed needs  - Assess need for intravenous fluids  - Provide specific nutrition/hydration education as appropriate  - Include patient/family/caregiver in decisions related to nutrition  Outcome: Progressing

## 2023-09-08 NOTE — DISCHARGE INSTR - AVS FIRST PAGE
Dear Tessie Cruz,     It was our pleasure to care for you here at [facility]. It is our hope that we were always able to exceed the expected standards for your care during your stay. You were hospitalized due to fall and UTI. You were cared for on the 7th floor by Meagan Magallon MD under the service of Meagan Magallon MD with the [facility] who covers for your primary care physician (PCP), Janette Ng DO, while you were hospitalized. If you have any questions or concerns related to this hospitalization, you may contact us at [phone]. For follow up as well as any medication refills, we recommend that you follow up with your primary care physician. A registered nurse will reach out to you by phone within a few days after your discharge to answer any additional questions that you may have after going home. However, at this time we provide for you here, the most important instructions / recommendations at discharge:     Notable Medication Adjustments -   Decrease levothyroxine to 100 mcg daily  Stop Robaxin  Decrease gabapentin frequency to twice instead of 3 times daily  Continue your prednisone dose as prescribed by your PCP  Testing Required after Discharge -   Thyroid function test in 6 weeks with your PCP  Important follow up information -   Follow up with PCP in 1 week  Other Instructions -   Maintain good hydration. If diarrhea recurs then please contact your PCP immediately   Please review this entire after visit summary as additional general instructions including medication list, appointments, activity, diet, any pertinent wound care, and other additional recommendations from your care team that may be provided for you.       Sincerely,     Meagan Magallon MD

## 2023-09-09 LAB
BACTERIA BLD CULT: NORMAL
BACTERIA BLD CULT: NORMAL

## 2023-09-19 NOTE — UTILIZATION REVIEW
NOTIFICATION OF ADMISSION DISCHARGE   This is a Notification of Discharge from [address] [facility]. Please be advised that this patient has been discharge from our facility. Below you will find the admission and discharge date and time including the patient’s disposition. UTILIZATION REVIEW CONTACT:  Sofia Mejia  Utilization   Network Utilization Review Department  Phone: [phone] x carefully listen to the prompts. All voicemails are confidential.  Email: [email or web address]     ADMISSION INFORMATION  PRESENTATION DATE: 9/4/2023 12:53 PM  OBERVATION ADMISSION DATE:   INPATIENT ADMISSION DATE: 9/4/23  5:05 PM   DISCHARGE DATE: 9/8/2023 12:59 PM   DISPOSITION:Home with [address] INFORMATION:  Send all requests for admission clinical reviews, approved or denied determinations and any other requests to dedicated fax number below belonging to the campus where the patient is receiving treatment.  List of dedicated fax numbers:  [place] DENIALS (Administrative/Medical Necessity) [phone] [facility] (Maternity/NICU/Pediatrics) [phone]   [facility] [phone]   [facility] [phone] [address] [phone]   [address] [place] [address] [phone]   [facility] [phone]   [address] [place] [address] [address] [phone]   [address] [place] [address] [phone]   [facility] [phone]   [address] [facility] [address] [facility] [phone]

## 2023-11-01 DIAGNOSIS — I50.32 CHRONIC DIASTOLIC CHF (CONGESTIVE HEART FAILURE), NYHA CLASS 2 (HCC): ICD-10-CM

## 2023-11-04 PROBLEM — N30.00 ACUTE CYSTITIS WITHOUT HEMATURIA: Status: RESOLVED | Noted: 2023-09-04 | Resolved: 2023-11-04

## 2023-11-18 ENCOUNTER — APPOINTMENT (EMERGENCY)
Dept: RADIOLOGY | Facility: HOSPITAL | Age: 88
DRG: 064 | End: 2023-11-18
Payer: COMMERCIAL

## 2023-11-18 ENCOUNTER — HOSPITAL ENCOUNTER (INPATIENT)
Facility: HOSPITAL | Age: 88
LOS: 10 days | Discharge: NURSING, REHAB OR LONG-TERM CARE | DRG: 064 | End: 2023-11-28
Attending: EMERGENCY MEDICINE | Admitting: INTERNAL MEDICINE
Payer: COMMERCIAL

## 2023-11-18 DIAGNOSIS — G93.41 METABOLIC ENCEPHALOPATHY: ICD-10-CM

## 2023-11-18 DIAGNOSIS — E11.9 TYPE 2 DIABETES MELLITUS WITHOUT COMPLICATION, WITHOUT LONG-TERM CURRENT USE OF INSULIN (HCC): ICD-10-CM

## 2023-11-18 DIAGNOSIS — I63.9 CEREBROVASCULAR ACCIDENT (CVA), UNSPECIFIED MECHANISM (HCC): ICD-10-CM

## 2023-11-18 DIAGNOSIS — I63.9 STROKE (HCC): Primary | ICD-10-CM

## 2023-11-18 DIAGNOSIS — Z98.1 H/O SPINAL FUSION: ICD-10-CM

## 2023-11-18 DIAGNOSIS — N39.0 UTI (URINARY TRACT INFECTION): ICD-10-CM

## 2023-11-18 DIAGNOSIS — I50.42 CHRONIC COMBINED SYSTOLIC AND DIASTOLIC CONGESTIVE HEART FAILURE (HCC): ICD-10-CM

## 2023-11-18 DIAGNOSIS — E03.9 ACQUIRED HYPOTHYROIDISM: ICD-10-CM

## 2023-11-18 DIAGNOSIS — E11.9 DM TYPE 2 (DIABETES MELLITUS, TYPE 2) (HCC): ICD-10-CM

## 2023-11-18 PROBLEM — R29.90 STROKE-LIKE SYMPTOMS: Status: ACTIVE | Noted: 2023-11-18

## 2023-11-18 PROBLEM — Z71.89 GOALS OF CARE, COUNSELING/DISCUSSION: Status: ACTIVE | Noted: 2023-11-18

## 2023-11-18 PROBLEM — R53.83 LETHARGY: Status: ACTIVE | Noted: 2023-11-18

## 2023-11-18 LAB
2HR DELTA HS TROPONIN: -2 NG/L
ANION GAP SERPL CALCULATED.3IONS-SCNC: 8 MMOL/L
APTT PPP: 26 SECONDS (ref 23–37)
BACTERIA UR QL AUTO: ABNORMAL /HPF
BILIRUB UR QL STRIP: NEGATIVE
BUN SERPL-MCNC: 57 MG/DL (ref 5–25)
CALCIUM SERPL-MCNC: 9.3 MG/DL (ref 8.4–10.2)
CARDIAC TROPONIN I PNL SERPL HS: 33 NG/L
CARDIAC TROPONIN I PNL SERPL HS: 35 NG/L
CHLORIDE SERPL-SCNC: 96 MMOL/L (ref 96–108)
CLARITY UR: ABNORMAL
CO2 SERPL-SCNC: 34 MMOL/L (ref 21–32)
COLOR UR: ABNORMAL
CREAT SERPL-MCNC: 1.41 MG/DL (ref 0.6–1.3)
ERYTHROCYTE [DISTWIDTH] IN BLOOD BY AUTOMATED COUNT: 15.6 % (ref 11.6–15.1)
FLUAV RNA RESP QL NAA+PROBE: NEGATIVE
FLUBV RNA RESP QL NAA+PROBE: NEGATIVE
GFR SERPL CREATININE-BSD FRML MDRD: 33 ML/MIN/1.73SQ M
GLUCOSE SERPL-MCNC: 129 MG/DL (ref 65–140)
GLUCOSE SERPL-MCNC: 129 MG/DL (ref 65–140)
GLUCOSE SERPL-MCNC: 130 MG/DL (ref 65–140)
GLUCOSE UR STRIP-MCNC: NEGATIVE MG/DL
HCT VFR BLD AUTO: 38.7 % (ref 34.8–46.1)
HGB BLD-MCNC: 12.3 G/DL (ref 11.5–15.4)
HGB UR QL STRIP.AUTO: ABNORMAL
INR PPP: 0.94 (ref 0.84–1.19)
KETONES UR STRIP-MCNC: NEGATIVE MG/DL
LEUKOCYTE ESTERASE UR QL STRIP: ABNORMAL
MCH RBC QN AUTO: 34.3 PG (ref 26.8–34.3)
MCHC RBC AUTO-ENTMCNC: 31.8 G/DL (ref 31.4–37.4)
MCV RBC AUTO: 108 FL (ref 82–98)
NITRITE UR QL STRIP: NEGATIVE
NON-SQ EPI CELLS URNS QL MICRO: ABNORMAL /HPF
PH UR STRIP.AUTO: 5.5 [PH]
PLATELET # BLD AUTO: 151 THOUSANDS/UL (ref 149–390)
PMV BLD AUTO: 10.1 FL (ref 8.9–12.7)
POTASSIUM SERPL-SCNC: 4.3 MMOL/L (ref 3.5–5.3)
PROCALCITONIN SERPL-MCNC: 0.1 NG/ML
PROT UR STRIP-MCNC: NEGATIVE MG/DL
PROTHROMBIN TIME: 12.4 SECONDS (ref 11.6–14.5)
RBC # BLD AUTO: 3.59 MILLION/UL (ref 3.81–5.12)
RBC #/AREA URNS AUTO: ABNORMAL /HPF
RSV RNA RESP QL NAA+PROBE: NEGATIVE
SARS-COV-2 RNA RESP QL NAA+PROBE: NEGATIVE
SODIUM SERPL-SCNC: 138 MMOL/L (ref 135–147)
SP GR UR STRIP.AUTO: 1.01 (ref 1–1.03)
UROBILINOGEN UR STRIP-ACNC: <2 MG/DL
WBC # BLD AUTO: 6.88 THOUSAND/UL (ref 4.31–10.16)
WBC #/AREA URNS AUTO: ABNORMAL /HPF

## 2023-11-18 PROCEDURE — 36415 COLL VENOUS BLD VENIPUNCTURE: CPT | Performed by: EMERGENCY MEDICINE

## 2023-11-18 PROCEDURE — 87086 URINE CULTURE/COLONY COUNT: CPT

## 2023-11-18 PROCEDURE — 96365 THER/PROPH/DIAG IV INF INIT: CPT

## 2023-11-18 PROCEDURE — 82948 REAGENT STRIP/BLOOD GLUCOSE: CPT

## 2023-11-18 PROCEDURE — 93005 ELECTROCARDIOGRAM TRACING: CPT

## 2023-11-18 PROCEDURE — 84145 PROCALCITONIN (PCT): CPT

## 2023-11-18 PROCEDURE — 81001 URINALYSIS AUTO W/SCOPE: CPT

## 2023-11-18 PROCEDURE — 85730 THROMBOPLASTIN TIME PARTIAL: CPT | Performed by: EMERGENCY MEDICINE

## 2023-11-18 PROCEDURE — 80048 BASIC METABOLIC PNL TOTAL CA: CPT | Performed by: EMERGENCY MEDICINE

## 2023-11-18 PROCEDURE — 87186 SC STD MICRODIL/AGAR DIL: CPT

## 2023-11-18 PROCEDURE — 99291 CRITICAL CARE FIRST HOUR: CPT | Performed by: PSYCHIATRY & NEUROLOGY

## 2023-11-18 PROCEDURE — 85027 COMPLETE CBC AUTOMATED: CPT | Performed by: EMERGENCY MEDICINE

## 2023-11-18 PROCEDURE — 71045 X-RAY EXAM CHEST 1 VIEW: CPT

## 2023-11-18 PROCEDURE — NC001 PR NO CHARGE: Performed by: PSYCHIATRY & NEUROLOGY

## 2023-11-18 PROCEDURE — 99285 EMERGENCY DEPT VISIT HI MDM: CPT | Performed by: EMERGENCY MEDICINE

## 2023-11-18 PROCEDURE — 99223 1ST HOSP IP/OBS HIGH 75: CPT | Performed by: INTERNAL MEDICINE

## 2023-11-18 PROCEDURE — 84484 ASSAY OF TROPONIN QUANT: CPT | Performed by: EMERGENCY MEDICINE

## 2023-11-18 PROCEDURE — 0241U HB NFCT DS VIR RESP RNA 4 TRGT: CPT | Performed by: EMERGENCY MEDICINE

## 2023-11-18 PROCEDURE — 99285 EMERGENCY DEPT VISIT HI MDM: CPT

## 2023-11-18 PROCEDURE — 85610 PROTHROMBIN TIME: CPT | Performed by: EMERGENCY MEDICINE

## 2023-11-18 PROCEDURE — 87040 BLOOD CULTURE FOR BACTERIA: CPT | Performed by: INTERNAL MEDICINE

## 2023-11-18 RX ORDER — PREDNISONE 5 MG/1
5 TABLET ORAL DAILY
Status: DISCONTINUED | OUTPATIENT
Start: 2023-11-19 | End: 2023-11-28 | Stop reason: HOSPADM

## 2023-11-18 RX ORDER — SENNOSIDES 8.6 MG
8.6 TABLET ORAL DAILY
Status: DISCONTINUED | OUTPATIENT
Start: 2023-11-19 | End: 2023-11-28 | Stop reason: HOSPADM

## 2023-11-18 RX ORDER — ASPIRIN 81 MG/1
81 TABLET, CHEWABLE ORAL DAILY
Status: DISCONTINUED | OUTPATIENT
Start: 2023-11-19 | End: 2023-11-28 | Stop reason: HOSPADM

## 2023-11-18 RX ORDER — GABAPENTIN 300 MG/1
300 CAPSULE ORAL 2 TIMES DAILY
Status: DISCONTINUED | OUTPATIENT
Start: 2023-11-18 | End: 2023-11-28 | Stop reason: HOSPADM

## 2023-11-18 RX ORDER — ASPIRIN 81 MG/1
81 TABLET, CHEWABLE ORAL DAILY
Status: DISCONTINUED | OUTPATIENT
Start: 2023-11-19 | End: 2023-11-18

## 2023-11-18 RX ORDER — SERTRALINE HYDROCHLORIDE 25 MG/1
25 TABLET, FILM COATED ORAL DAILY
Status: DISCONTINUED | OUTPATIENT
Start: 2023-11-19 | End: 2023-11-28 | Stop reason: HOSPADM

## 2023-11-18 RX ORDER — METHOCARBAMOL 750 MG/1
750 TABLET, FILM COATED ORAL EVERY 6 HOURS PRN
Status: DISCONTINUED | OUTPATIENT
Start: 2023-11-18 | End: 2023-11-28 | Stop reason: HOSPADM

## 2023-11-18 RX ORDER — POLYETHYLENE GLYCOL 3350 17 G/17G
17 POWDER, FOR SOLUTION ORAL DAILY
Status: DISCONTINUED | OUTPATIENT
Start: 2023-11-19 | End: 2023-11-28 | Stop reason: HOSPADM

## 2023-11-18 RX ORDER — ONDANSETRON 2 MG/ML
4 INJECTION INTRAMUSCULAR; INTRAVENOUS EVERY 6 HOURS PRN
Status: DISCONTINUED | OUTPATIENT
Start: 2023-11-18 | End: 2023-11-18

## 2023-11-18 RX ORDER — TORSEMIDE 20 MG/1
20 TABLET ORAL 2 TIMES DAILY
Status: DISCONTINUED | OUTPATIENT
Start: 2023-11-18 | End: 2023-11-21

## 2023-11-18 RX ORDER — LEVOTHYROXINE SODIUM 0.1 MG/1
100 TABLET ORAL
Status: DISCONTINUED | OUTPATIENT
Start: 2023-11-19 | End: 2023-11-28 | Stop reason: HOSPADM

## 2023-11-18 RX ORDER — ISOSORBIDE MONONITRATE 30 MG/1
30 TABLET, EXTENDED RELEASE ORAL DAILY
Status: DISCONTINUED | OUTPATIENT
Start: 2023-11-19 | End: 2023-11-28 | Stop reason: HOSPADM

## 2023-11-18 RX ORDER — CALCIUM CARBONATE 500 MG/1
1000 TABLET, CHEWABLE ORAL DAILY PRN
Status: DISCONTINUED | OUTPATIENT
Start: 2023-11-18 | End: 2023-11-28 | Stop reason: HOSPADM

## 2023-11-18 RX ORDER — SERTRALINE HYDROCHLORIDE 25 MG/1
25 TABLET, FILM COATED ORAL
Status: DISCONTINUED | OUTPATIENT
Start: 2023-11-18 | End: 2023-11-18

## 2023-11-18 RX ORDER — ASPIRIN 81 MG/1
TABLET, CHEWABLE ORAL
Status: COMPLETED
Start: 2023-11-18 | End: 2023-11-18

## 2023-11-18 RX ORDER — CLOPIDOGREL BISULFATE 75 MG/1
75 TABLET ORAL DAILY
Status: DISCONTINUED | OUTPATIENT
Start: 2023-11-19 | End: 2023-11-28 | Stop reason: HOSPADM

## 2023-11-18 RX ORDER — ENOXAPARIN SODIUM 100 MG/ML
40 INJECTION SUBCUTANEOUS DAILY
Status: DISCONTINUED | OUTPATIENT
Start: 2023-11-19 | End: 2023-11-18

## 2023-11-18 RX ORDER — COLCHICINE 0.6 MG/1
0.6 TABLET ORAL DAILY
Status: DISCONTINUED | OUTPATIENT
Start: 2023-11-19 | End: 2023-11-18

## 2023-11-18 RX ORDER — COLCHICINE 0.6 MG/1
0.6 TABLET ORAL EVERY OTHER DAY
Status: DISCONTINUED | OUTPATIENT
Start: 2023-11-18 | End: 2023-11-28 | Stop reason: HOSPADM

## 2023-11-18 RX ORDER — ASPIRIN 81 MG/1
324 TABLET, CHEWABLE ORAL ONCE
Status: COMPLETED | OUTPATIENT
Start: 2023-11-18 | End: 2023-11-18

## 2023-11-18 RX ORDER — ALBUTEROL SULFATE 90 UG/1
2 AEROSOL, METERED RESPIRATORY (INHALATION) EVERY 4 HOURS PRN
Status: DISCONTINUED | OUTPATIENT
Start: 2023-11-18 | End: 2023-11-28 | Stop reason: HOSPADM

## 2023-11-18 RX ORDER — HEPARIN SODIUM 5000 [USP'U]/ML
5000 INJECTION, SOLUTION INTRAVENOUS; SUBCUTANEOUS EVERY 8 HOURS SCHEDULED
Status: DISCONTINUED | OUTPATIENT
Start: 2023-11-19 | End: 2023-11-28 | Stop reason: HOSPADM

## 2023-11-18 RX ORDER — ALLOPURINOL 300 MG/1
150 TABLET ORAL DAILY
Status: DISCONTINUED | OUTPATIENT
Start: 2023-11-19 | End: 2023-11-28 | Stop reason: HOSPADM

## 2023-11-18 RX ORDER — ONDANSETRON 2 MG/ML
4 INJECTION INTRAMUSCULAR; INTRAVENOUS EVERY 6 HOURS PRN
Status: DISCONTINUED | OUTPATIENT
Start: 2023-11-18 | End: 2023-11-28 | Stop reason: HOSPADM

## 2023-11-18 RX ADMIN — ASPIRIN 81 MG CHEWABLE TABLET 324 MG: 81 TABLET CHEWABLE at 16:47

## 2023-11-18 RX ADMIN — TORSEMIDE 20 MG: 20 TABLET ORAL at 21:45

## 2023-11-18 RX ADMIN — CEFTRIAXONE SODIUM 1000 MG: 10 INJECTION, POWDER, FOR SOLUTION INTRAVENOUS at 16:45

## 2023-11-18 RX ADMIN — METHOCARBAMOL 750 MG: 750 TABLET ORAL at 23:15

## 2023-11-18 RX ADMIN — ASPIRIN 324 MG: 81 TABLET, CHEWABLE ORAL at 16:47

## 2023-11-18 RX ADMIN — GABAPENTIN 300 MG: 300 CAPSULE ORAL at 21:45

## 2023-11-18 RX ADMIN — IOHEXOL 85 ML: 350 INJECTION, SOLUTION INTRAVENOUS at 15:22

## 2023-11-18 RX ADMIN — METOPROLOL TARTRATE 25 MG: 25 TABLET, FILM COATED ORAL at 21:45

## 2023-11-18 NOTE — ASSESSMENT & PLAN NOTE
Levothyroxine 100 daily. Patient previously was taking 112 which was changed to 100.   We will continue with levothyroxine at 100 mcg daily

## 2023-11-18 NOTE — CONSULTS
NEUROLOGY RESIDENCY CONSULT NOTE     Name: Artie Powell   Age & Sex: 80 y.o. female   MRN: [id number]  Unit/Bed#: [facility]   Encounter: [id number]  Length of Stay: 0    Recommendations for outpatient neurological follow up have yet to be determined. ASSESSMENT & PLAN     Stroke-like symptoms  Assessment & Plan  Assessment:  Artie Powell is a 80 y.o. female  who presented as stroke alert on 2023 and LKW the night prior at 2200. Initial presenting deficits were left leg weakness. The patient has a pmhx significant for prior strokes, T2DM, SLE, CKD4, RA, Chronic UTI, Periperal neuropathy, renal artery stenosis, Subdural hematoma, AZUL, HLD and HTN. The patient was started on the stroke pathway and admitted. Workup: In the ED, Presenting Blood Pressure: 135/59  CT Head showed:  No evidence of acute infarct, intracranial hemorrhage or mass. CTA head and neck showed: No hemodynamically significant stenosis, dissection or occlusion of the carotid or vertebral arteries or major vessels of the [place]. Lab Results   Component Value Date    HGBA1C 6.3 (H) 03/15/2023     Lab Results   Component Value Date    CHOLESTEROL 144 10/23/2019    TRIG 223 (H) 10/23/2019    HDL 33 (L) 10/23/2019    LDLCALC 66 10/23/2019       Risk factors: hypertension, diabetes, hyperlipidemia, peripheral vascular disease, known carotid disease, prior stroke, and reduced ejection fraction    Pertinent Scores:  - NIHSS: 3  NIHSS:    1a.Level of Consciousness: 0 = Alert   1b. LOC Questions: 1 = Answers one correctly   1c. LOC Commands: 0 = Obeys both correctly   2. Best Gaze: 0 = Normal   3. Visual: 0 = No visual field loss   4. Facial Palsy: 0=Normal symmetric movement   5a. Motor Right Arm: 0=No drift, limb holds 90 (or 45) degrees for full 10 seconds   5b. Motor Left Arm: 0=No drift, limb holds 90 (or 45) degrees for full 10 seconds   6a. Motor Right Le=No drift, limb holds 90 (or 45) degrees for full 10 seconds   6b. Motor Left Le=Some effort against gravity, limb cannot get to or maintain (if cured) 90 (or 45) degrees, drifts down to bed, but has some effort against gravity   7. Limb Ataxia:  0=Absent   8. Sensory: 0=Normal; no sensory loss   9. Best Language:  0=No aphasia, normal   10. Dysarthria: 0=Normal articulation   11. Extinction and Inattention (formerly Neglect): 0=No abnormality   Total Score: 3     Impression: Patient with extensive PMH including a stroke in the past that had left sided persistent mild residual deficits. Suspect recrudescence vs underlying toxic/metabolic encephalopathy versus new stroke. Plan:  - Recommend admit to hospital on acute ischemic stroke pathway  - MRI brain without contrast  - 325mg Aspirin load, followed by daily 81mg aspirin (discontinue if no evidence of new ischemic stroke). - Continue Neuro checks  - Permissive HTN for the first 24 hours up to   - Maintain glucose <180, SSI for coverage if indicated  - Repeat CTH if GCS drops 2pts in 1hr  - Fasting lipid panel & hemoglobin A1c  - PT/OT/ST/PM&R Evaluation  - Echocardiogram  - Continue monitoring on telemetry  - Rest of care per primary team      SUBJECTIVE     Reason for Consult / Principal Problem: Stroke alert    HPI: Anival Mary is a 80 y.o. female who presents as a stroke alert after she had left sided weakness when she woke up at noon. Patient has a past medical history of prior strokes, T2DM, SLE, CKD 4, RA, chronic UTI, peripheral neuropathy, renal artery stenosis, subdural hematoma, AZUL, HLD and HTN. The patient's daughter was present at bedside to provide history. The daughter states the patient has been declining in her overall health since 2023 when she was admitted to the hospital for weakness. The daughter also reports that the patient has been minimally ambulating since September. She states that the patient has been shuffling around to move.   The patient also reports past history of multiple strokes with residual left-sided deficits. Chart review reveals 2 admissions for strokes 1 in the left thalamus with reported right-sided deficits in 2005. The patient also had a lacunar stroke in the right MCA territory with left-sided deficits for that admission. The daughter also reports that the patient has a history of chronic UTIs and wishes for her to be checked for a recurrent UTI. The patient will be admitted with the medicine team under the stroke pathway to rule out new stroke versus alternative etiologies that could be making her overall clinical picture worse. Inpatient consult to Neurology  Consult performed by: Alex Lancaster  Consult ordered by: Andrea Santiago MD        Historical Information   Past Medical History:   Diagnosis Date    Arthritis     Breast cancer ([address]) 09/08/2015    BILATERAL    Cardiac disease     aortic valve transplant    CHF (congestive heart failure) ([facility])     Chronic kidney disease     Compression fracture of body of thoracic vertebra ([facility])     CVA (cerebral vascular accident) ([address])     Diabetes mellitus ([address])     Disease of thyroid gland     Fibromyalgia, primary     Gout     H/O cervical fracture 01/09/2019    Hyperlipidemia     Hypertension     Neuropathy     AZUL (obstructive sleep apnea) 10/19/2019    Pressure injury of skin     Renal disorder     kidney stent    Stroke [facility])     UTI (urinary tract infection)     colinized bacteria.      Past Surgical History:   Procedure Laterality Date    AORTIC VALVE SURGERY      BREAST LUMPECTOMY Right 09/08/2015    BREAST LUMPECTOMY Left 09/08/2015    BREAST SURGERY      lumpectomy for breast cancer    BREAST SURGERY      CARDIAC VALVE REPLACEMENT      CATARACT EXTRACTION      CERVICAL SPINE SURGERY      CHOLECYSTECTOMY      FACIAL/NECK BIOPSY Right 9/24/2020    Procedure: EXCISION CHEEK LESION X2 WITH FROZEN SECTION;  Surgeon: Luna Andrade DO;  Location: [address] [place] [address];  Service: [address] [place] [address] THORACENTESIS  2/28/2020    JOINT REPLACEMENT      KIDNEY SURGERY      stent placed for kidney    KNEE SURGERY      OTHER SURGICAL HISTORY      abdominal aneurysm surgery    SD ARTHRODESIS POSTERIOR ATLAS-AXIS C1-C2 N/A 5/1/2019    Procedure: C1-C2 lateral mass fixation fusion with possible sublaminar cables;  Surgeon: Dalphine Blizzard, MD;  Location: [facility];  Service: Neurosurgery    REPLACEMENT TOTAL KNEE      left      Social History   Social History     Substance and Sexual Activity   Alcohol Use Not Currently    Comment: rarely, 1 glass wine per month     Social History     Substance and Sexual Activity   Drug Use Not Currently     E-Cigarette/Vaping    E-Cigarette Use Never User      E-Cigarette/Vaping Substances    Nicotine No     THC No     CBD No     Flavoring No     Other No     Unknown No      Social History     Tobacco Use   Smoking Status Never   Smokeless Tobacco Never     Family History: non-contributory  Meds/Allergies   all current active meds have been reviewed  Allergies   Allergen Reactions    Duloxetine Hcl Other (See Comments) and Hypertension    Duloxetine Hcl      Cymbalta;  Hemorrhagic stroke listed as reaction    Duloxetine Hcl Hemorrhagic stroke and Hypertension    Escitalopram      Other reaction(s): Urinary Retention    Pregabalin      Other reaction(s): Hypertension    Statins Myalgia    Tramadol      Other reaction(s): Hypertension    Triprolidine-Pse Other (See Comments)    Anastrozole Abdominal Pain    Anastrozole     Anastrozole Other (See Comments)     Other     Antihistamines, Diphenhydramine-Type     Exemestane      Aromasin; Muscle pain & cramps    Exemestane Other (See Comments)     Other     Lexapro [Escitalopram]      Urinary retention    Lexapro [Escitalopram] Other (See Comments)     Urinary retention    Lyrica [Pregabalin]      hypertension    Lyrica [Pregabalin] Hypertension    Metformin      diarrhea    Metformin Other (See Comments)     Other       Oxycodone      Pt unsure      Oxycodone Other (See Comments)     Other       Pseudoephedrine Other (See Comments)     Other       Statins      Muscle pain & cramps    Statins Myalgia    Tramadol      hypertension    Tramadol Hypertension    Triprolidine Other (See Comments)     Other       Triprolidine-Pseudoephedrine     Metformin Diarrhea    Metolazone Other (See Comments)     Dizzyness, hypotension       Review of previous medical records was completed. OBJECTIVE     Patient ID: Ruby Miles is a 80 y.o. female. Vitals:   Vitals:    23 1518 23 1535 23 1536 23 1637   BP: 135/59 (!) 175/76 139/79 139/62   BP Location:   Right arm    Pulse: 62 62 62 56   Resp: 16 16 16 20   Temp: 98 °F (36.7 °C)  98 °F (36.7 °C)    TempSrc: Oral  Oral    SpO2: 97% 94% 93% 95%      There is no height or weight on file to calculate BMI. No intake or output data in the 24 hours ending 23 1702    Temperature:   Temp (24hrs), Av °F (36.7 °C), Min:98 °F (36.7 °C), Max:98 °F (36.7 °C)    Temperature: 98 °F (36.7 °C)    Invasive Devices: Invasive Devices       Peripheral Intravenous Line  Duration             Peripheral IV 21 Right Forearm 834 days    Peripheral IV 23 Left Antecubital <1 day              Drain  Duration             External Urinary Catheter 547 days                    Physical Exam  Vitals reviewed. Neurological:      Mental Status: She is oriented to person, place, and time. Cranial Nerves: Cranial nerves 2-12 are intact. Neurologic Exam     Mental Status   Oriented to person, place, and time. Cranial Nerves   Cranial nerves II through XII intact. Motor Exam   Muscle bulk: normal    Strength   Strength 5/5 except as noted.    Right iliopsoas: 4/5  Left iliopsoas: 3/5  Right quadriceps: 4/5  Left quadriceps: 3/5  Right hamstrin/5  Left hamstring: 3/5    Sensory Exam   Light touch normal.   Vibration normal.   Pinprick normal.   Sensory deficit distribution on left: ulnar  Temperature sensation equal and intact bilaterally. LABORATORY DATA     Labs: I have personally reviewed pertinent reports. Results from last 7 days   Lab Units 11/18/23  1520   WBC Thousand/uL 6.88   HEMOGLOBIN g/dL 12.3   HEMATOCRIT % 38.7   PLATELETS Thousands/uL 151      IMAGING & DIAGNOSTIC TESTING     Radiology Results: I have personally reviewed pertinent reports. CTA stroke alert (head/neck)   Final Result by Ricardo Medina MD (11/18 1547)      No hemodynamically significant stenosis, dissection or occlusion of the carotid or vertebral arteries or major vessels of the [place]               Findings were directly discussed with Dee Hummel at  3:45 PM middle dose. Workstation performed: [id number]         CT stroke alert brain   Final Result by Ricardo Medina MD (11/18 1547)      No evidence of acute infarct, intracranial hemorrhage or mass. Findings were directly discussed with Dee Hummel at  3:40 PM  .      Workstation performed: [id number]         XR chest 1 view portable    (Results Pending)       Other Diagnostic Testing: I have personally reviewed pertinent reports. ACTIVE MEDICATIONS     Current Facility-Administered Medications   Medication Dose Route Frequency    [START ON 11/19/2023] aspirin chewable tablet 81 mg  81 mg Oral Daily    ceftriaxone (ROCEPHIN) 1 g/50 mL in dextrose IVPB  1,000 mg Intravenous Once       Prior to Admission medications    Medication Sig Start Date End Date Taking?  Authorizing Provider   acetaminophen (TYLENOL) 325 mg tablet Take 2 tablets (650 mg total) by mouth every 6 (six) hours as needed for mild pain  Patient not taking: Reported on 4/25/2023 5/26/22   Helene Lux MD   acetaminophen (TYLENOL) 325 mg tablet Take 3 tablets (975 mg total) by mouth every 8 (eight) hours  Patient not taking: Reported on 4/25/2023 2/14/23   Yuridia Masters PA-C   acetaminophen (TYLENOL) 500 mg tablet Take 1,000 mg by mouth every 6 (six) hours as needed for mild pain    Historical Provider, MD   albuterol (2.5 mg/3 mL) 0.083 % nebulizer solution Take 1 vial (2.5 mg total) by nebulization 3 (three) times a day  Patient taking differently: Take 2.5 mg by nebulization 3 (three) times a day as needed 2/27/20   Joselyn Allen DO   albuterol (2.5 mg/3 mL) 0.083 % nebulizer solution Take 2.5 mg by nebulization every 6 (six) hours as needed for wheezing or shortness of breath    Historical Provider, MD   albuterol (PROVENTIL HFA,VENTOLIN HFA) 90 mcg/act inhaler Inhale 2 puffs every 4 (four) hours as needed for wheezing    Historical Provider, MD   albuterol (PROVENTIL HFA,VENTOLIN HFA) 90 mcg/act inhaler Inhale 2 puffs every 4 (four) hours as needed for wheezing 5/26/22   Mary Tejeda MD   albuterol (PROVENTIL HFA,VENTOLIN HFA) 90 mcg/act inhaler Inhale 2 puffs every 6 (six) hours as needed for wheezing    Historical Provider, MD   allopurinol (ZYLOPRIM) 100 mg tablet Take 1.5 tablets by mouth daily     Historical Provider, MD   allopurinol (ZYLOPRIM) 300 mg tablet Take 0.5 tablets (150 mg total) by mouth daily 5/26/22   Mary Tejeda MD   allopurinol (ZYLOPRIM) 300 mg tablet Take 200 mg by mouth daily    Historical Provider, MD   allopurinol (ZYLOPRIM) 300 mg tablet Take 0.5 tablets (150 mg total) by mouth daily 9/8/23   Ashish Robison MD   Ascorbic Acid (vitamin C) 1000 MG tablet Take 1,000 mg by mouth daily    Historical Provider, MD   ascorbic acid (VITAMIN C) 500 MG tablet Take 500 mg by mouth daily    Historical Provider, MD   calcium carbonate (OS-EMILY) 1250 (500 Ca) MG tablet Take 1 tablet by mouth daily    Historical Provider, MD   calcium carbonate (TUMS) 500 mg chewable tablet Chew 2 tablets (1,000 mg total) daily as needed for indigestion or heartburn 5/26/22   Mary Tejeda MD   clopidogrel (PLAVIX) 75 mg tablet Take 1 tablet (75 mg total) by mouth daily 5/26/22   Chrystal GOTTI Mina Fleming MD   clopidogrel (PLAVIX) 75 mg tablet Take 75 mg by mouth daily    Historical Provider, MD   clopidogrel (PLAVIX) 75 mg tablet TAKE 1 TABLET DAILY 5/5/23   Abel Paredes DO   clopidogrel (PLAVIX) 75 mg tablet Take 75 mg by mouth daily    Historical Provider, MD   colchicine (COLCRYS) 0.6 mg tablet Take 0.6 mg by mouth as needed Every 3 days    Historical Provider, MD   docusate sodium (COLACE) 100 mg capsule Take 100 mg by mouth 2 (two) times a day as needed for constipation    Historical Provider, MD   docusate sodium (COLACE) 100 mg capsule Take 1 capsule (100 mg total) by mouth 2 (two) times a day  Patient taking differently: Take 100 mg by mouth 2 (two) times a day PRN 5/26/22   MD brigitte Rodriguezusate sodium (COLACE) 100 mg capsule Take 1 capsule (100 mg total) by mouth 2 (two) times a day 2/14/23   Karina Pérez PA-C   enoxaparin (LOVENOX) 30 mg/0.3 mL Inject 0.3 mL (30 mg total) under the skin daily for 21 days 5/26/22 6/16/22  Jeanna Hwang MD   fenofibrate (FENOGLIDE) 120 MG TABS Take 1 tablet (120 mg total) by mouth daily  Patient not taking: Reported on 3/27/2023 9/14/22   Abel Paredes DO   gabapentin (NEURONTIN) 300 mg capsule Take 300 mg by mouth 2 (two) times a day Take 300 mg in am and 600 mg at bedtime    Historical Provider, MD   gabapentin (NEURONTIN) 300 mg capsule Take 1 capsule (300 mg total) by mouth 3 (three) times a day 5/26/22   Jeanna Hwang MD   gabapentin (NEURONTIN) 300 mg capsule Take 1 capsule (300 mg total) by mouth 2 (two) times a day 9/8/23   Kishan Camilo MD   gabapentin (NEURONTIN) 600 MG tablet Take 1 tablet (600 mg total) by mouth daily at bedtime  Patient not taking: Reported on 3/27/2023 2/14/23   Karina Pérez PA-C   HYDROcodone-acetaminophen [facility]) 5-325 mg per tablet Take 1 tablet by mouth every 6 (six) hours as needed for pain Max Daily Amount: 4 tablets 6/2/22   NATHEN Nathan   HYDROcodone-acetaminophen [facility] 5-325 mg per tablet Take 1 tablet by mouth every 6 (six) hours as needed for pain    Historical Provider, MD   hydrocortisone (ANUSOL-HC) 25 mg suppository Insert 25 mg into the rectum 2 (two) times a day as needed    Historical Provider, MD   isosorbide mononitrate (IMDUR) 30 mg 24 hr tablet Take 1 tablet (30 mg total) by mouth daily 5/26/22   Joao Navarrete MD   isosorbide mononitrate (IMDUR) 30 mg 24 hr tablet TAKE 1 TABLET DAILY 1/17/23   Caesar Erwin MD   isosorbide mononitrate (IMDUR) 30 mg 24 hr tablet Take 30 mg by mouth daily    Historical Provider, MD   isosorbide mononitrate (IMDUR) 30 mg 24 hr tablet Take 30 mg by mouth daily    Historical Provider, MD   levothyroxine 100 mcg tablet Take 1 tablet (100 mcg total) by mouth daily  Patient not taking: Reported on 4/25/2023 3/1/23   Ramses Acosta MD   levothyroxine 100 mcg tablet Take 1 tablet (100 mcg total) by mouth daily 9/8/23   Gayle Merida MD   levothyroxine 112 mcg tablet Take 112 mcg by mouth daily  9/30/19   Historical Provider, MD   levothyroxine 112 mcg tablet Take 1 tablet (112 mcg total) by mouth daily in the early morning 5/27/22   Joao Navarrete MD   lidocaine (LIDODERM) 5 % Apply 1 patch topically as needed Remove & Discard patch within 12 hours or as directed by MD Angelito Oropeza MD   lidocaine (LIDODERM) 5 % Apply 1 patch topically daily Remove & Discard patch within 12 hours or as directed by MD 5/26/22   Joao Navarrete MD   lidocaine (LIDODERM) 5 % Apply 1 patch topically daily Remove & Discard patch within 12 hours or as directed by MD    Historical Provider, MD   Lidocaine HCl (Aspercreme Lidocaine) 4 % CREA Apply topically every 6 (six) hours as needed    Historical Provider, MD   loperamide (IMODIUM A-D) 2 MG tablet Take 1 tablet (2 mg total) by mouth 3 (three) times a day as needed for diarrhea 9/8/23   MD Earnest Kaufman 0.01 % ophthalmic drops Administer 1 drop to both eyes daily at bedtime 2/19/22   Historical Provider, MD   methenamine hippurate (HIPREX) 1 g tablet Take 1 g by mouth 2 (two) times a day with meals    Historical Provider, MD   methenamine hippurate (HIPREX) 1 g tablet Take 1 g by mouth 2 (two) times a day with meals    Historical Provider, MD   methocarbamol (ROBAXIN) 500 mg tablet Take 500 mg by mouth 3 (three) times a day as needed for muscle spasms    Historical Provider, MD   methocarbamol (ROBAXIN) 500 mg tablet Take 1 tablet (500 mg total) by mouth every 6 (six) hours as needed for muscle spasms 2/14/23   Laurent Pérez PA-C   methocarbamol (ROBAXIN) 750 mg tablet Take 1 tablet (750 mg total) by mouth every 6 (six) hours as needed for muscle spasms 5/26/22   Jovita Pearce MD   metolazone (ZAROXOLYN) 5 mg tablet Take 1 tablet (5 mg total) by mouth as needed (if gains 2lbs in 2 days.)  Patient not taking: Reported on 8/27/2021 11/6/19   Melissa Lea MD   metoprolol tartrate (LOPRESSOR) 25 mg tablet Take 25 mg by mouth every 12 (twelve) hours    Historical Provider, MD   metoprolol tartrate (LOPRESSOR) 25 mg tablet TAKE 1 TABLET TWICE A DAY 11/2/23   Rosita Marie DO   metoprolol tartrate (LOPRESSOR) 25 mg tablet Take 25 mg by mouth every 12 (twelve) hours    Historical Provider, MD   Multiple Vitamin (multivitamin) capsule Take 1 capsule by mouth daily  Patient not taking: Reported on 10/15/2021     Historical Provider, MD   Multiple Vitamins-Calcium (MULTI-DAY/CALCIUM/EXTRA IRON PO) Take 1 tablet by mouth daily    Historical Provider, MD   Multiple Vitamins-Minerals (multivitamin with iron-minerals) liquid Take 15 mL by mouth daily 5/26/22   Jovita Pearce MD   nitroglycerin (NITROSTAT) 0.4 mg SL tablet  1/29/20   Historical Provider, MD   nystatin (MYCOSTATIN) powder Apply topically 2 (two) times a day 2/14/23   Laurent Pérez PA-C   polyethylene glycol (MIRALAX) 17 g packet Take 17 g by mouth daily Do not start before February 15, 2023. 2/15/23 Karina Pérez PA-C   predniSONE 5 mg tablet 5 mg as needed    Historical Provider, MD   predniSONE 5 mg tablet Take by mouth daily    Historical Provider, MD   Red Yeast Rice Extract (RED YEAST RICE PO) Take 600 mg by mouth daily    Historical Provider, MD   senna (SENOKOT) 8.6 mg Take 1 tablet (8.6 mg total) by mouth daily 5/26/22   MD rowdy Rodriguez (SENOKOT) 8.6 mg Take 2 tablets (17.2 mg total) by mouth daily Do not start before February 15, 2023. Patient not taking: Reported on 3/27/2023 2/15/23   Karina Pérez PA-C   sertraline (ZOLOFT) 25 mg tablet 25 mg daily at bedtime  5/12/18   Historical Provider, MD   sertraline (ZOLOFT) 25 mg tablet Take 1 tablet (25 mg total) by mouth daily 5/26/22   Jeanna Hwang MD   sertraline (ZOLOFT) 25 mg tablet Take 25 mg by mouth daily    Historical Provider, MD   sertraline (ZOLOFT) 50 mg tablet Take 50 mg by mouth daily at bedtime  Patient not taking: Reported on 4/25/2023    Historical Provider, MD   torsemide (DEMADEX) 20 mg tablet Take 1 tablet (20 mg total) by mouth 2 (two) times a day May take one extra dose as needed for wt gain,increased sob or increased edema. 6/7/22   Abel Paredes, DO   torsemide (DEMADEX) 20 mg tablet Take 2 tablets (40 mg total) by mouth 2 (two) times a day 3/1/23   Jyoti Rome MD   torsemide (DEMADEX) 20 mg tablet Take 20 mg by mouth 2 (two) times a day    Historical Provider, MD   torsemide 40 MG TABS Take 40 mg by mouth daily 5/26/22   Jeanna Hwang MD       VTE Pharmacologic Prophylaxis: Per primary team  VTE Mechanical Prophylaxis: Per primary team    ======    I have discussed the patient's history, physical exam findings, assessment, and plan in detail with attending, Dr. Deanna Basilio    Thank you for allowing me to participate in the care of your patient, Warren Cornejo.     [facility] Res[facility], PGY-2

## 2023-11-18 NOTE — ASSESSMENT & PLAN NOTE
Patient had prehospital stroke alert initiated. Note neurology input  Medicine admission for stroke pathway  Continue with supportive care. History of hypertension  Aspirin 81 mg daily. Loaded with 325 in ER. Patient on Plavix at home.   Allow for permissive hypertension

## 2023-11-18 NOTE — CONSULTS
Duplicate consult    Please see original consult note completed earlier today by Dr. Guillermo Wong and attested by Dr. Faviola Conley

## 2023-11-18 NOTE — ED ATTENDING ATTESTATION
11/18/2023  I, Caitlin Carbajal MD, saw and evaluated the patient. I have discussed the patient with the resident/non-physician practitioner and agree with the resident's/non-physician practitioner's findings, Plan of Care, and MDM as documented in the resident's/non-physician practitioner's note, except where noted. All available labs and Radiology studies were reviewed. I was present for key portions of any procedure(s) performed by the resident/non-physician practitioner and I was immediately available to provide assistance. At this point I agree with the current assessment done in the Emergency Department. I have conducted an independent evaluation of this patient a history and physical is as follows:    ED Course         Critical Care Time  Procedures    79 yo female with hx of chf, ckd, dmtia, stroke, on plavix, woke up at 12 with left leg weakness and falling to left side. Pt with hx of similar symptoms with tia. Pt last known normal last night at 9pm.  Pt with no cp, no sob, no abdominal pain. No headache. Pt was preshospital stroke alert, neuro came to bedside. Pt given oral glucose for bs of 77 prehospital.  Vss, afebrile, lungs cta, rrr, abodmen soft nontender, left leg weakness. Nih ss 3, not tnk candidate due to time since last known well beyond three hours. Ct, cta. Cardiac workup. Admit under stroke pathway.

## 2023-11-18 NOTE — ASSESSMENT & PLAN NOTE
Wt Readings from Last 3 Encounters:   09/05/23 72.6 kg (160 lb)   02/15/23 74.4 kg (164 lb 1.6 oz)   02/13/23 73.8 kg (162 lb 12.8 oz)       Continue with Plavix, Imdur, Lopressor and torsemide 20 twice daily  Patient followed by palliative care as an outpatient with [place] care at [facility]

## 2023-11-18 NOTE — ASSESSMENT & PLAN NOTE
Lab Results   Component Value Date    EGFR 33 11/18/2023    EGFR 29 09/07/2023    EGFR 30 09/06/2023    CREATININE 1.41 (H) 11/18/2023    CREATININE 1.55 (H) 09/07/2023    CREATININE 1.52 (H) 09/06/2023   Monitor renal function

## 2023-11-18 NOTE — ED PROVIDER NOTES
History  Chief Complaint   Patient presents with    STROKE Alert     Per ems, patient woke up around noon with ams and lethargy, hx of cvas per patient's daughter. HPI    Patient is a 80 y.o. female with PMHx stroke on Plavix, HTN, DM, RA who presents to the ED via EMS as a prehospital stroke alert. Per EMS report patient went to sleep around 8-9 pm last night in normal state of health. When she woke up the following day around noon, daughter noticed increased lethargy, patient was more difficult to arouse, and left-sided weakness. Daughter states she has history of recurrent UTIs and typically get generalized weakness. Patient has no complaints at this time. Denies any chest pain, shortness of breath, abdominal pain, nausea, vomiting, diarrhea, headache, dizziness. Daughter reports that she had URI symptoms about 2 weeks ago and still has a persistent cough. Prior to Admission Medications   Prescriptions Last Dose Informant Patient Reported? Taking?    Ascorbic Acid (vitamin C) 1000 MG tablet   Yes No   Sig: Take 1,000 mg by mouth daily   HYDROcodone-acetaminophen (NORCO) 5-325 mg per tablet   No No   Sig: Take 1 tablet by mouth every 6 (six) hours as needed for pain Max Daily Amount: 4 tablets   HYDROcodone-acetaminophen (NORCO) 5-325 mg per tablet   Yes No   Sig: Take 1 tablet by mouth every 6 (six) hours as needed for pain   Lidocaine HCl (Aspercreme Lidocaine) 4 % CREA   Yes No   Sig: Apply topically every 6 (six) hours as needed   Lumigan 0.01 % ophthalmic drops   Yes No   Sig: Administer 1 drop to both eyes daily at bedtime     Multiple Vitamin (multivitamin) capsule  Child Yes No   Sig: Take 1 capsule by mouth daily   Patient not taking: Reported on 10/15/2021    Multiple Vitamins-Calcium (MULTI-DAY/CALCIUM/EXTRA IRON PO)  Child Yes No   Sig: Take 1 tablet by mouth daily   Multiple Vitamins-Minerals (multivitamin with iron-minerals) liquid   No No   Sig: Take 15 mL by mouth daily   Red Yeast Rice Extract (RED YEAST RICE PO)  Child Yes No   Sig: Take 600 mg by mouth daily   acetaminophen (TYLENOL) 325 mg tablet   No No   Sig: Take 2 tablets (650 mg total) by mouth every 6 (six) hours as needed for mild pain   Patient not taking: Reported on 4/25/2023   acetaminophen (TYLENOL) 325 mg tablet   No No   Sig: Take 3 tablets (975 mg total) by mouth every 8 (eight) hours   Patient not taking: Reported on 4/25/2023   acetaminophen (TYLENOL) 500 mg tablet   Yes No   Sig: Take 1,000 mg by mouth every 6 (six) hours as needed for mild pain   albuterol (2.5 mg/3 mL) 0.083 % nebulizer solution  Child No No   Sig: Take 1 vial (2.5 mg total) by nebulization 3 (three) times a day   Patient taking differently: Take 2.5 mg by nebulization 3 (three) times a day as needed   albuterol (2.5 mg/3 mL) 0.083 % nebulizer solution  Family Member Yes No   Sig: Take 2.5 mg by nebulization every 6 (six) hours as needed for wheezing or shortness of breath   albuterol (PROVENTIL HFA,VENTOLIN HFA) 90 mcg/act inhaler  Child Yes No   Sig: Inhale 2 puffs every 4 (four) hours as needed for wheezing   albuterol (PROVENTIL HFA,VENTOLIN HFA) 90 mcg/act inhaler   No No   Sig: Inhale 2 puffs every 4 (four) hours as needed for wheezing   albuterol (PROVENTIL HFA,VENTOLIN HFA) 90 mcg/act inhaler   Yes No   Sig: Inhale 2 puffs every 6 (six) hours as needed for wheezing   allopurinol (ZYLOPRIM) 100 mg tablet  Child Yes No   Sig: Take 1.5 tablets by mouth daily    allopurinol (ZYLOPRIM) 300 mg tablet   No No   Sig: Take 0.5 tablets (150 mg total) by mouth daily   allopurinol (ZYLOPRIM) 300 mg tablet   Yes No   Sig: Take 200 mg by mouth daily   allopurinol (ZYLOPRIM) 300 mg tablet   No No   Sig: Take 0.5 tablets (150 mg total) by mouth daily   ascorbic acid (VITAMIN C) 500 MG tablet   Yes No   Sig: Take 500 mg by mouth daily   calcium carbonate (OS-EMILY) 1250 (500 Ca) MG tablet  Child Yes No   Sig: Take 1 tablet by mouth daily   calcium carbonate (TUMS) 500 mg chewable tablet   No No   Sig: Chew 2 tablets (1,000 mg total) daily as needed for indigestion or heartburn   clopidogrel (PLAVIX) 75 mg tablet   No No   Sig: Take 1 tablet (75 mg total) by mouth daily   clopidogrel (PLAVIX) 75 mg tablet  Family Member Yes No   Sig: Take 75 mg by mouth daily   clopidogrel (PLAVIX) 75 mg tablet   No No   Sig: TAKE 1 TABLET DAILY   clopidogrel (PLAVIX) 75 mg tablet   Yes No   Sig: Take 75 mg by mouth daily   colchicine (COLCRYS) 0.6 mg tablet  Child Yes No   Sig: Take 0.6 mg by mouth as needed Every 3 days   docusate sodium (COLACE) 100 mg capsule  Child Yes No   Sig: Take 100 mg by mouth 2 (two) times a day as needed for constipation   docusate sodium (COLACE) 100 mg capsule   No No   Sig: Take 1 capsule (100 mg total) by mouth 2 (two) times a day   Patient taking differently: Take 100 mg by mouth 2 (two) times a day PRN   docusate sodium (COLACE) 100 mg capsule   No No   Sig: Take 1 capsule (100 mg total) by mouth 2 (two) times a day   enoxaparin (LOVENOX) 30 mg/0.3 mL   No No   Sig: Inject 0.3 mL (30 mg total) under the skin daily for 21 days   fenofibrate (FENOGLIDE) 120 MG TABS   No No   Sig: Take 1 tablet (120 mg total) by mouth daily   Patient not taking: Reported on 3/27/2023   gabapentin (NEURONTIN) 300 mg capsule   Yes No   Sig: Take 300 mg by mouth 2 (two) times a day Take 300 mg in am and 600 mg at bedtime   gabapentin (NEURONTIN) 300 mg capsule   No No   Sig: Take 1 capsule (300 mg total) by mouth 3 (three) times a day   gabapentin (NEURONTIN) 300 mg capsule   No No   Sig: Take 1 capsule (300 mg total) by mouth 2 (two) times a day   gabapentin (NEURONTIN) 600 MG tablet   No No   Sig: Take 1 tablet (600 mg total) by mouth daily at bedtime   Patient not taking: Reported on 3/27/2023   hydrocortisone (ANUSOL-HC) 25 mg suppository   Yes No   Sig: Insert 25 mg into the rectum 2 (two) times a day as needed   isosorbide mononitrate (IMDUR) 30 mg 24 hr tablet   No No   Sig: Take 1 tablet (30 mg total) by mouth daily   isosorbide mononitrate (IMDUR) 30 mg 24 hr tablet   No No   Sig: TAKE 1 TABLET DAILY   isosorbide mononitrate (IMDUR) 30 mg 24 hr tablet  Family Member Yes No   Sig: Take 30 mg by mouth daily   isosorbide mononitrate (IMDUR) 30 mg 24 hr tablet   Yes No   Sig: Take 30 mg by mouth daily   levothyroxine 100 mcg tablet   No No   Sig: Take 1 tablet (100 mcg total) by mouth daily   Patient not taking: Reported on 4/25/2023   levothyroxine 100 mcg tablet   No No   Sig: Take 1 tablet (100 mcg total) by mouth daily   levothyroxine 112 mcg tablet  Child Yes No   Sig: Take 112 mcg by mouth daily    levothyroxine 112 mcg tablet   No No   Sig: Take 1 tablet (112 mcg total) by mouth daily in the early morning   lidocaine (LIDODERM) 5 %  Child Yes No   Sig: Apply 1 patch topically as needed Remove & Discard patch within 12 hours or as directed by MD    lidocaine (LIDODERM) 5 %   No No   Sig: Apply 1 patch topically daily Remove & Discard patch within 12 hours or as directed by MD   lidocaine (LIDODERM) 5 %   Yes No   Sig: Apply 1 patch topically daily Remove & Discard patch within 12 hours or as directed by MD   loperamide (IMODIUM A-D) 2 MG tablet   No No   Sig: Take 1 tablet (2 mg total) by mouth 3 (three) times a day as needed for diarrhea   methenamine hippurate (HIPREX) 1 g tablet   Yes No   Sig: Take 1 g by mouth 2 (two) times a day with meals   methenamine hippurate (HIPREX) 1 g tablet  Family Member Yes No   Sig: Take 1 g by mouth 2 (two) times a day with meals   methocarbamol (ROBAXIN) 500 mg tablet  Child Yes No   Sig: Take 500 mg by mouth 3 (three) times a day as needed for muscle spasms   methocarbamol (ROBAXIN) 500 mg tablet   No No   Sig: Take 1 tablet (500 mg total) by mouth every 6 (six) hours as needed for muscle spasms   methocarbamol (ROBAXIN) 750 mg tablet   No No   Sig: Take 1 tablet (750 mg total) by mouth every 6 (six) hours as needed for muscle spasms metolazone (ZAROXOLYN) 5 mg tablet  Child No No   Sig: Take 1 tablet (5 mg total) by mouth as needed (if gains 2lbs in 2 days.)   Patient not taking: Reported on 2021   metoprolol tartrate (LOPRESSOR) 25 mg tablet  Family Member Yes No   Sig: Take 25 mg by mouth every 12 (twelve) hours   metoprolol tartrate (LOPRESSOR) 25 mg tablet   No No   Sig: TAKE 1 TABLET TWICE A DAY   metoprolol tartrate (LOPRESSOR) 25 mg tablet   Yes No   Sig: Take 25 mg by mouth every 12 (twelve) hours   nitroglycerin (NITROSTAT) 0.4 mg SL tablet  Child Yes No   nystatin (MYCOSTATIN) powder   No No   Sig: Apply topically 2 (two) times a day   polyethylene glycol (MIRALAX) 17 g packet   No No   Sig: Take 17 g by mouth daily Do not start before February 15, 2023. predniSONE 5 mg tablet   Yes No   Si mg as needed   predniSONE 5 mg tablet   Yes No   Sig: Take by mouth daily   senna (SENOKOT) 8.6 mg   No No   Sig: Take 1 tablet (8.6 mg total) by mouth daily   senna (SENOKOT) 8.6 mg   No No   Sig: Take 2 tablets (17.2 mg total) by mouth daily Do not start before February 15, 2023. Patient not taking: Reported on 3/27/2023   sertraline (ZOLOFT) 25 mg tablet  Child Yes No   Si mg daily at bedtime    sertraline (ZOLOFT) 25 mg tablet   No No   Sig: Take 1 tablet (25 mg total) by mouth daily   sertraline (ZOLOFT) 25 mg tablet   Yes No   Sig: Take 25 mg by mouth daily   sertraline (ZOLOFT) 50 mg tablet   Yes No   Sig: Take 50 mg by mouth daily at bedtime   Patient not taking: Reported on 2023   torsemide (DEMADEX) 20 mg tablet   No No   Sig: Take 1 tablet (20 mg total) by mouth 2 (two) times a day May take one extra dose as needed for wt gain,increased sob or increased edema.    torsemide (DEMADEX) 20 mg tablet   No No   Sig: Take 2 tablets (40 mg total) by mouth 2 (two) times a day   torsemide (DEMADEX) 20 mg tablet   Yes No   Sig: Take 20 mg by mouth 2 (two) times a day   torsemide 40 MG TABS   No No   Sig: Take 40 mg by mouth daily      Facility-Administered Medications: None       Past Medical History:   Diagnosis Date    Arthritis     Breast cancer ([address]) 09/08/2015    BILATERAL    Cardiac disease     aortic valve transplant    CHF (congestive heart failure) (HCC)     Chronic kidney disease     Compression fracture of body of thoracic vertebra (HCC)     CVA (cerebral vascular accident) ([address])     Diabetes mellitus ([address])     Disease of thyroid gland     Fibromyalgia, primary     Gout     H/O cervical fracture 01/09/2019    Hyperlipidemia     Hypertension     Neuropathy     AZUL (obstructive sleep apnea) 10/19/2019    Pressure injury of skin     Renal disorder     kidney stent    Stroke [facility])     UTI (urinary tract infection)     colinized bacteria.        Past Surgical History:   Procedure Laterality Date    AORTIC VALVE SURGERY      BREAST LUMPECTOMY Right 09/08/2015    BREAST LUMPECTOMY Left 09/08/2015    BREAST SURGERY      lumpectomy for breast cancer    BREAST SURGERY      CARDIAC VALVE REPLACEMENT      CATARACT EXTRACTION      CERVICAL SPINE SURGERY      CHOLECYSTECTOMY      FACIAL/NECK BIOPSY Right 9/24/2020    Procedure: EXCISION CHEEK LESION X2 WITH FROZEN SECTION;  Surgeon: Prosper Mock DO;  Location: [address] [place] [address] MAIN OR;  Service: [address] [place] [address] THORACENTESIS  2/28/2020    JOINT REPLACEMENT      KIDNEY SURGERY      stent placed for kidney    KNEE SURGERY      OTHER SURGICAL HISTORY      abdominal aneurysm surgery    MO ARTHRODESIS POSTERIOR ATLAS-AXIS C1-C2 N/A 5/1/2019    Procedure: C1-C2 lateral mass fixation fusion with possible sublaminar cables;  Surgeon: Gasper Pittman MD;  Location:  MAIN OR;  Service: Neurosurgery    REPLACEMENT TOTAL KNEE      left        Family History   Problem Relation Age of Onset    Diabetes Mother     Heart attack Mother     Heart attack Father     Heart disease Mother     Arthritis Mother     Heart failure Father     Arthritis Father     Other Father         enlargement of prostate     Dementia Father     No Known Problems Daughter     No Known Problems Maternal Grandmother     No Known Problems Maternal Grandfather     No Known Problems Paternal Grandmother     No Known Problems Paternal Grandfather     No Known Problems Maternal Aunt     No Known Problems Maternal Aunt     No Known Problems Maternal Aunt     No Known Problems Maternal Aunt     No Known Problems Paternal Aunt     No Known Problems Paternal Aunt     Prostate cancer Son     Prostate cancer Son      I have reviewed and agree with the history as documented. E-Cigarette/Vaping    E-Cigarette Use Never User      E-Cigarette/Vaping Substances    Nicotine No     THC No     CBD No     Flavoring No     Other No     Unknown No      Social History     Tobacco Use    Smoking status: Never    Smokeless tobacco: Never   Vaping Use    Vaping Use: Never used   Substance Use Topics    Alcohol use: Not Currently     Comment: rarely, 1 glass wine per month    Drug use: Not Currently        Review of Systems   Constitutional:  Positive for fatigue. Neurological:  Positive for weakness. Physical Exam  ED Triage Vitals   Temperature Pulse Respirations Blood Pressure SpO2   11/18/23 1518 11/18/23 1518 11/18/23 1518 11/18/23 1518 11/18/23 1518   98 °F (36.7 °C) 62 16 135/59 97 %      Temp Source Heart Rate Source Patient Position - Orthostatic VS BP Location FiO2 (%)   11/18/23 1518 11/18/23 1518 11/18/23 1518 11/18/23 1536 --   Oral Monitor Lying Right arm       Pain Score       11/18/23 1536       No Pain             Orthostatic Vital Signs  Vitals:    11/18/23 1518 11/18/23 1535 11/18/23 1536 11/18/23 1637   BP: 135/59 (!) 175/76 139/79 139/62   Pulse: 62 62 62 56   Patient Position - Orthostatic VS: Lying  Lying Lying       Physical Exam  Vitals and nursing note reviewed. Constitutional:       General: She is not in acute distress. Appearance: Normal appearance. She is well-developed.  She is not ill-appearing, toxic-appearing or diaphoretic. HENT:      Head: Normocephalic and atraumatic. Nose: Nose normal.      Mouth/Throat:      Mouth: Mucous membranes are moist.   Eyes:      General: No scleral icterus. Right eye: No discharge. Left eye: No discharge. Extraocular Movements: Extraocular movements intact. Conjunctiva/sclera: Conjunctivae normal.      Pupils: Pupils are equal, round, and reactive to light. Cardiovascular:      Rate and Rhythm: Normal rate and regular rhythm. Heart sounds: No murmur heard. Pulmonary:      Effort: Pulmonary effort is normal. No respiratory distress. Breath sounds: Normal breath sounds. Abdominal:      Palpations: Abdomen is soft. Tenderness: There is no abdominal tenderness. Musculoskeletal:         General: No swelling. Cervical back: Neck supple. Skin:     General: Skin is warm and dry. Capillary Refill: Capillary refill takes less than 2 seconds. Neurological:      General: No focal deficit present. Mental Status: She is alert and oriented to person, place, and time. Mental status is at baseline. Cranial Nerves: No cranial nerve deficit. Sensory: No sensory deficit. Psychiatric:         Mood and Affect: Mood normal.         ED Medications  Medications   aspirin chewable tablet 81 mg (has no administration in time range)   iohexol (OMNIPAQUE) 350 MG/ML injection (MULTI-DOSE) 85 mL (85 mL Intravenous Given 11/18/23 1522)   ceftriaxone (ROCEPHIN) 1 g/50 mL in dextrose IVPB (0 mg Intravenous Stopped 11/18/23 1719)   aspirin chewable tablet 324 mg (324 mg Oral Given 11/18/23 1647)       Diagnostic Studies  Results Reviewed       Procedure Component Value Units Date/Time    HS Troponin I 2hr [[id number]] Collected: 11/18/23 1719    Lab Status:  In process Specimen: Blood from Arm, Left Updated: 11/18/23 [address] [facility] [[id number]]  (Normal) Collected: 11/18/23 1611    Lab Status: Final result Specimen: Blood from Arm, Left Updated: 11/18/23 1652     Protime 12.4 seconds      INR 0.94    APTT [[id number]]  (Normal) Collected: 11/18/23 1611    Lab Status: Final result Specimen: Blood from Arm, Left Updated: 11/18/23 1652     PTT 26 seconds     Procalcitonin [[id number]]  (Normal) Collected: 11/18/23 1520    Lab Status: Final result Specimen: Blood from Arm, Left Updated: 11/18/23 1651     Procalcitonin 0.10 ng/ml     Urine Microscopic [[id number]]  (Abnormal) Collected: 11/18/23 1612    Lab Status: Final result Specimen: Urine, Straight Cath Updated: 11/18/23 1648     RBC, UA 4-10 /hpf      WBC, UA Innumerable /hpf      Epithelial Cells Occasional /hpf      Bacteria, UA Moderate /hpf     Urine culture [[id number]] Collected: 11/18/23 1612    Lab Status:  In process Specimen: Urine, Straight Cath Updated: 11/18/23 1648    UA w Reflex to Microscopic w Reflex to Culture [[id number]]  (Abnormal) Collected: 11/18/23 1612    Lab Status: Final result Specimen: Urine, Straight Cath Updated: 11/18/23 1637     Color, UA Light Yellow     Clarity, UA Turbid     Specific Gravity, UA 1.009     pH, UA 5.5     Leukocytes, UA Large     Nitrite, UA Negative     Protein, UA Negative mg/dl      Glucose, UA Negative mg/dl      Ketones, UA Negative mg/dl      Urobilinogen, UA <2.0 mg/dl      Bilirubin, UA Negative     Occult Blood, UA Small    HS Troponin I 4hr [[id number]]     Lab Status: No result Specimen: Blood     HS Troponin 0hr (reflex protocol) [[id number]]  (Normal) Collected: 11/18/23 1520    Lab Status: Final result Specimen: Blood from Arm, Left Updated: 11/18/23 1553     hs TnI 0hr 35 ng/L     Basic metabolic panel [[id number]]  (Abnormal) Collected: 11/18/23 1520    Lab Status: Final result Specimen: Blood from Arm, Left Updated: 11/18/23 1545     Sodium 138 mmol/L      Potassium 4.3 mmol/L      Chloride 96 mmol/L      CO2 34 mmol/L      ANION GAP 8 mmol/L      BUN 57 mg/dL      Creatinine 1.41 mg/dL      Glucose 129 mg/dL      Calcium 9.3 mg/dL      eGFR 33 ml/min/1.73sq m     Narrative:      [facility] guidelines for Chronic Kidney Disease (CKD):     Stage 1 with normal or high GFR (GFR > 90 mL/min/1.73 square meters)    Stage 2 Mild CKD (GFR = 60-89 mL/min/1.73 square meters)    Stage 3A Moderate CKD (GFR = 45-59 mL/min/1.73 square meters)    Stage 3B Moderate CKD (GFR = 30-44 mL/min/1.73 square meters)    Stage 4 Severe CKD (GFR = 15-29 mL/min/1.73 square meters)    Stage 5 End Stage CKD (GFR <15 mL/min/1.73 square meters)  Note: GFR calculation is accurate only with a steady state creatinine    Fingerstick Glucose (POCT) [[id number]]  (Normal) Collected: 11/18/23 1533    Lab Status: Final result Updated: 11/18/23 1533     POC Glucose 129 mg/dl     Fingerstick Glucose (POCT) [[id number]]  (Normal) Collected: 11/18/23 1517    Lab Status: Final result Updated: 11/18/23 1533     POC Glucose 130 mg/dl     CBC and Platelet [[id number]]  (Abnormal) Collected: 11/18/23 1520    Lab Status: Final result Specimen: Blood from Arm, Left Updated: 11/18/23 1527     WBC 6.88 Thousand/uL      RBC 3.59 Million/uL      Hemoglobin 12.3 g/dL      Hematocrit 38.7 %       fL      MCH 34.3 pg      MCHC 31.8 g/dL      RDW 15.6 %      Platelets 812 Thousands/uL      MPV 10.1 fL     FLU/RSV/COVID - if FLU/RSV clinically relevant [[id number]]     Lab Status: No result Specimen: Nares from Nose                    CTA stroke alert (head/neck)   Final Result by Felix Murdock MD (11/18 1547)      No hemodynamically significant stenosis, dissection or occlusion of the carotid or vertebral arteries or major vessels of the [place]               Findings were directly discussed with Yong Casey at  3:45 PM middle dose. Workstation performed: [id number]         CT stroke alert brain   Final Result by Felix Murdock MD (11/18 1547)      No evidence of acute infarct, intracranial hemorrhage or mass. Findings were directly discussed with Dee Hummel at  3:40 PM  .      Workstation performed: [id number]         XR chest 1 view portable    (Results Pending)         Procedures  Procedures      ED Course  ED Course as of 11/18/23 1748   Sat Nov 18, 2023   1612 CT stroke alert brain  No evidence of acute infarct, intracranial hemorrhage or mass. 18 CTA stroke alert (head/neck)  No hemodynamically significant stenosis, dissection or occlusion of the carotid or vertebral arteries or major vessels of the [place]   1639 Leukocytes, UA(!): Large   1639 Nitrite, UA: Negative   1650 WBC, UA(!): Innumerable   1650 Bacteria, UA(!): Moderate   1713 Reached out to [facility] for admission    1721 Admitted to [facility] for UTI and stroke pathway               Identification of Seniors at [address] [place] [address] Drive Most Recent Value   (ISAR) Identification of Seniors at Risk    Before the illness or injury that brought you to the Emergency, did you need someone to help you on a regular basis? 1 Filed at: 11/18/2023 1538   In the last 24 hours, have you needed more help than usual? 1 Filed at: 11/18/2023 1538   Have you been hospitalized for one or more nights during the past 6 months? 1 Filed at: 11/18/2023 1538   In general, do you see well? 1 Filed at: 11/18/2023 1538   In general, do you have serious problems with your memory? 1 Filed at: 11/18/2023 1538   Do you take more than three different medications every day?  1 Filed at: 11/18/2023 1538   ISAR Score 6 Filed at: 11/18/2023 [facility]           Stroke Assessment       Row Name 11/18/23 1747             NIH Stroke Scale    Interval --      Level of Consciousness (1a.) 0      LOC Questions (1b.) 1      LOC Commands (1c.) 0      Best Gaze (2.) 0      Visual (3.) 0      Facial Palsy (4.) 0      Motor Arm, Left (5a.) 0      Motor Arm, Right (5b.) 0      Motor Leg, Left (6a.) 2      Motor Leg, Right (6b.) 0      Limb Ataxia (7.) 0      Sensory (8.) 0      Best Language (9.) 0 Dysarthria (10.) 0      Extinction and Inattention (11.) (Formerly Neglect) 0      Total 3                    Flowsheet Row Most Recent Value   Thrombolytic Decision Options    Thrombolytic Decision Patient not a candidate. Medical Decision Making  Amount and/or Complexity of Data Reviewed  Labs: ordered. Decision-making details documented in ED Course. Radiology: ordered. Decision-making details documented in ED Course. Risk  Prescription drug management. Decision regarding hospitalization. ASSESSMENT: Patient is a 80 y.o. female who presents as a prehospital stroke alert. DDX includes but not limited to: infection, electrolyte derangement, trauma, stroke. PLAN: stroke and infectious workup. See ED course for details. Disposition  Final diagnoses:   Stroke [facility])   UTI (urinary tract infection)     Time reflects when diagnosis was documented in both MDM as applicable and the Disposition within this note       Time User Action Codes Description Comment    11/18/2023  3:07 PM Robert Murillo Add [I63.9] Stroke ([address])     11/18/2023  5:17 PM Willam URBANO Add [N39.0] UTI (urinary tract infection)           ED Disposition       ED Disposition   Admit    Condition   Stable    Date/Time   Sat Nov 18, 2023 1717    Comment   Case was discussed with Dr. Scottie Mccartney and the patient's admission status was agreed to be Admission Status: inpatient status to the service of Dr. Scottie Mccartney. Follow-up Information    None         Patient's Medications   Discharge Prescriptions    No medications on file     No discharge procedures on file. PDMP Review         Value Time User    PDMP Reviewed  Yes 2/14/2023 12:13 PM Leigh Ann Pérez PA-C             ED Provider  Attending physically available and evaluated Anival Mary. I managed the patient along with the ED Attending.     Electronically Signed by           Willam Duong MD  11/18/23 7871

## 2023-11-18 NOTE — ASSESSMENT & PLAN NOTE
Of note, patient is followed by [facility] palliative care. Note patient's outpatient notes. Confirmed with granddaughter that she is a level 3 DNR. Darylene Pipe She has been followed by [facility] palliative care due to history of heart failure. We will continue with aggressive medical management and work-up for stroke. Family is agreeable to stroke pathway work-up and recommendations. Patient presents with left-sided weakness which is gradually improving on presentation to the ED.

## 2023-11-18 NOTE — ASSESSMENT & PLAN NOTE
80year-old presenting as a stroke alert on 11/18/2023 with LKW night prior. Initial presenting deficits were LLE weakness. Initial NIH stroke scale of 3 for disorientation, and LLE drift. BP on presentation was 135/59. CTH without evidence of acute infarction, intracranial hemorrhage, mass. CTA H/N did not show any LVO. Given that patient was outside the window she did not receive TNK. Home AP/AC: Plavix  PMhx: Prior Strokes, DM2, HTN, HLD, PVD, SDH, AZUL,CHF, Neuropathy, CKD, SLE, RA, Chronic UTI, Breast CA  Pertinent scores: NIHSS 3, MRS 4    NC CTH: No evidence of acute infarct, hemorrhage or mass. Chronic lacunar infarct in the right lentiform nuclei  CTA H/N: No hemodynamically significant stenosis, dissection or occlusion of the carotid or vertebral arteries or major vessels of the [place]. 11/19 MRI Brain: Acute lacunar infarct in the right centrum semiovale. 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 Initial Ddx suspect recrudesce in the setting of underlying systemic insult vs toxic metabolic encephalopathy vs ischemic stroke. MRI does indicate an acute infarct so patient will require DAPT for 21 days. Will be important to optimize patient's risk factors.      Plan:  Frequent Neuro checks, obtain state CTH if GCS drops 2pts in 1hr  AC/AP: DAPT - Continue home Plavix 75 mg QD, started ASA 81 mg QD  Statin: intolerant, family has agreed to try low intensity Pravachol 10 mg QD with dinner  Imaging: TTE  DVT PPx: Heparin SQ, SCD's  Maintain glucose <180, SSI for coverage if indicated  PT/OT/ST/PMR  Stroke Education  Rest of care per primary team

## 2023-11-18 NOTE — ASSESSMENT & PLAN NOTE
Rule out stroke as above  Patient started on Rocephin empirically for possible urinary tract infection. Follow-up on final cultures  Continue with antibiotic therapy  Continue with stroke pathway work-up as above.

## 2023-11-18 NOTE — H&P
[facility] [facility]  H&P  Name: Grecia Alfaro 80 y.o. female I MRN: [id number]  Unit/Bed#: CRB I Date of Admission: 11/18/2023   Date of Service: 11/18/2023 I Hospital Day: 0      Assessment/Plan   * Stroke-like symptoms  Assessment & Plan  Patient had prehospital stroke alert initiated. Note neurology input  Medicine admission for stroke pathway  Continue with supportive care. History of hypertension  Aspirin 81 mg daily. Loaded with 325 in ER. Patient on Plavix at home. Allow for permissive hypertension      Goals of care, counseling/discussion  Assessment & Plan  Of note, patient is followed by [facility] palliative care. Note patient's outpatient notes. Confirmed with granddaughter that she is a level 3 DNR. Deepti Mccain She has been followed by [place] palliative care due to history of heart failure. We will continue with aggressive medical management and work-up for stroke. Family is agreeable to stroke pathway work-up and recommendations. Patient presents with left-sided weakness which is gradually improving on presentation to the ED. Lethargy  Assessment & Plan  Rule out stroke as above  Patient started on Rocephin empirically for possible urinary tract infection. Follow-up on final cultures  Continue with antibiotic therapy  Continue with stroke pathway work-up as above. Hyperlipidemia  Assessment & Plan  See medication.   Intolerant to statin-myalgia    Type 2 diabetes mellitus (HCC)  Assessment & Plan    Lab Results   Component Value Date    HGBA1C 6.3 (H) 03/15/2023   Sliding-scale insulin    Rheumatoid arthritis ([address])  Assessment & Plan  Family reports that she is on prednisone 5 mg daily    CHF (congestive heart failure) ([address])  Assessment & Plan  Wt Readings from Last 3 Encounters:   09/05/23 72.6 kg (160 lb)   02/15/23 74.4 kg (164 lb 1.6 oz)   02/13/23 73.8 kg (162 lb 12.8 oz)       Continue with Plavix, Imdur, Lopressor and torsemide 20 twice daily  Patient followed by palliative care as an outpatient with [place] care at [address] chronic kidney disease [facility])  Assessment & Plan  Lab Results   Component Value Date    EGFR 33 11/18/2023    EGFR 29 09/07/2023    EGFR 30 09/06/2023    CREATININE 1.41 (H) 11/18/2023    CREATININE 1.55 (H) 09/07/2023    CREATININE 1.52 (H) 09/06/2023   Monitor renal function    SLE (systemic lupus erythematosus) ([address])  Assessment & Plan  Currently on prednisone. Daughter confirms that she is taking 5 mg daily    Hypothyroidism  Assessment & Plan  Levothyroxine 100 daily. Patient previously was taking 112 which was changed to 100. We will continue with levothyroxine at 100 mcg daily           VTE Prophylaxis: Enoxaparin (Lovenox)  / sequential compression device   3 DNR  POLST: There is no POLST form on file for this patient (pre-hospital)  Discussion with family: Family at bedside    Anticipated Length of Stay:  Patient will be admitted on an Inpatient basis with an anticipated length of stay of greater than 2 midnights. Justification for Hospital Stay: Needs further evaluation of strokelike symptoms    Total Time for Visit, including Counseling / Coordination of Care:  85 .  Greater than 50% of this total time spent on direct patient counseling and coordination of care. Chief Complaint:   Strokelike symptoms    History of Present Illness:    Rl Glover is a 80 y.o. female who presents to the hospital initially as a stroke alert. Patient was last known to be well the night prior to evaluation in the ED. Patient presented with a reported deficit of the left leg with symptoms of weakness and lethargy. She has multiple chronic comorbidities including type 2 diabetes lupus chronic kidney disease stage IV rheumatoid arthritis at prior urinary tract infections. She also has a history of renal artery stenosis and prior subdural hematoma sleep apnea hyperlipidemia and hypertension. Patient was evaluated in the ED.   Neurology recommended inpatient admission for acute ischemic stroke pathway work-up. Follow-up on MRI imaging and monitoring of blood pressure. Review of Systems:    Review of Systems   Constitutional:  Positive for fatigue. Negative for appetite change, chills and diaphoresis. Respiratory:  Negative for chest tightness and shortness of breath. Genitourinary:  Negative for difficulty urinating. Neurological:  Negative for dizziness. Past Medical and Surgical History:     Past Medical History:   Diagnosis Date    Arthritis     Breast cancer ([address]) 09/08/2015    BILATERAL    Cardiac disease     aortic valve transplant    CHF (congestive heart failure) (HCC)     Chronic kidney disease     Compression fracture of body of thoracic vertebra (HCC)     CVA (cerebral vascular accident) ([address])     Diabetes mellitus ([address])     Disease of thyroid gland     Fibromyalgia, primary     Gout     H/O cervical fracture 01/09/2019    Hyperlipidemia     Hypertension     Neuropathy     AZUL (obstructive sleep apnea) 10/19/2019    Pressure injury of skin     Renal disorder     kidney stent    Stroke [facility])     UTI (urinary tract infection)     colinized bacteria.        Past Surgical History:   Procedure Laterality Date    AORTIC VALVE SURGERY      BREAST LUMPECTOMY Right 09/08/2015    BREAST LUMPECTOMY Left 09/08/2015    BREAST SURGERY      lumpectomy for breast cancer    BREAST SURGERY      CARDIAC VALVE REPLACEMENT      CATARACT EXTRACTION      CERVICAL SPINE SURGERY      CHOLECYSTECTOMY      FACIAL/NECK BIOPSY Right 9/24/2020    Procedure: EXCISION CHEEK LESION X2 WITH FROZEN SECTION;  Surgeon: Eric Rose DO;  Location: [address] [place] [address] OR;  Service: [address] [place] [address]    IR THORACENTESIS  2/28/2020    JOINT REPLACEMENT      KIDNEY SURGERY      stent placed for kidney    KNEE SURGERY      OTHER SURGICAL HISTORY      abdominal aneurysm surgery    TX ARTHRODESIS POSTERIOR ATLAS-AXIS C1-C2 N/A 5/1/2019    Procedure: C1-C2 lateral mass fixation fusion with possible sublaminar cables;  Surgeon: Bailey Moss MD;  Location: [facility];  Service: Neurosurgery    REPLACEMENT TOTAL KNEE      left        Meds/Allergies:    Prior to Admission medications    Medication Sig Start Date End Date Taking?  Authorizing Provider   acetaminophen (TYLENOL) 325 mg tablet Take 2 tablets (650 mg total) by mouth every 6 (six) hours as needed for mild pain  Patient not taking: Reported on 4/25/2023 5/26/22   Margo Gupta MD   acetaminophen (TYLENOL) 325 mg tablet Take 3 tablets (975 mg total) by mouth every 8 (eight) hours  Patient not taking: Reported on 4/25/2023 2/14/23   Keyla Pérez PA-C   acetaminophen (TYLENOL) 500 mg tablet Take 1,000 mg by mouth every 6 (six) hours as needed for mild pain    Historical Provider, MD   albuterol (2.5 mg/3 mL) 0.083 % nebulizer solution Take 1 vial (2.5 mg total) by nebulization 3 (three) times a day  Patient taking differently: Take 2.5 mg by nebulization 3 (three) times a day as needed 2/27/20   Jasvir Galindo DO   albuterol (2.5 mg/3 mL) 0.083 % nebulizer solution Take 2.5 mg by nebulization every 6 (six) hours as needed for wheezing or shortness of breath    Historical Provider, MD   albuterol (PROVENTIL HFA,VENTOLIN HFA) 90 mcg/act inhaler Inhale 2 puffs every 4 (four) hours as needed for wheezing    Historical Provider, MD   albuterol (PROVENTIL HFA,VENTOLIN HFA) 90 mcg/act inhaler Inhale 2 puffs every 4 (four) hours as needed for wheezing 5/26/22   Margo Gupta MD   albuterol (PROVENTIL HFA,VENTOLIN HFA) 90 mcg/act inhaler Inhale 2 puffs every 6 (six) hours as needed for wheezing    Historical Provider, MD   allopurinol (ZYLOPRIM) 100 mg tablet Take 1.5 tablets by mouth daily     Historical Provider, MD   allopurinol (ZYLOPRIM) 300 mg tablet Take 0.5 tablets (150 mg total) by mouth daily 5/26/22   Margo Gupta MD   allopurinol (ZYLOPRIM) 300 mg tablet Take 200 mg by mouth daily Historical Provider, MD   allopurinol (ZYLOPRIM) 300 mg tablet Take 0.5 tablets (150 mg total) by mouth daily 9/8/23   Ashish Robsion MD   Ascorbic Acid (vitamin C) 1000 MG tablet Take 1,000 mg by mouth daily    Historical Provider, MD   ascorbic acid (VITAMIN C) 500 MG tablet Take 500 mg by mouth daily    Historical Provider, MD   calcium carbonate (OS-EMILY) 1250 (500 Ca) MG tablet Take 1 tablet by mouth daily    Historical Provider, MD   calcium carbonate (TUMS) 500 mg chewable tablet Chew 2 tablets (1,000 mg total) daily as needed for indigestion or heartburn 5/26/22   Berenice Ferrari MD   clopidogrel (PLAVIX) 75 mg tablet Take 1 tablet (75 mg total) by mouth daily 5/26/22   Berenice Ferrari MD   clopidogrel (PLAVIX) 75 mg tablet Take 75 mg by mouth daily    Historical Provider, MD   clopidogrel (PLAVIX) 75 mg tablet TAKE 1 TABLET DAILY 5/5/23   Merline Sours, DO   clopidogrel (PLAVIX) 75 mg tablet Take 75 mg by mouth daily    Historical Provider, MD   colchicine (COLCRYS) 0.6 mg tablet Take 0.6 mg by mouth as needed Every 3 days    Historical Provider, MD   docusate sodium (COLACE) 100 mg capsule Take 100 mg by mouth 2 (two) times a day as needed for constipation    Historical Provider, MD   docusate sodium (COLACE) 100 mg capsule Take 1 capsule (100 mg total) by mouth 2 (two) times a day  Patient taking differently: Take 100 mg by mouth 2 (two) times a day PRN 5/26/22   Berenice Ferrari MD   docusate sodium (COLACE) 100 mg capsule Take 1 capsule (100 mg total) by mouth 2 (two) times a day 2/14/23   Elli Pérez PA-C   enoxaparin (LOVENOX) 30 mg/0.3 mL Inject 0.3 mL (30 mg total) under the skin daily for 21 days 5/26/22 6/16/22  Berenice Ferrari MD   fenofibrate (FENOGLIDE) 120 MG TABS Take 1 tablet (120 mg total) by mouth daily  Patient not taking: Reported on 3/27/2023 9/14/22   Merline Sours, DO   gabapentin (NEURONTIN) 300 mg capsule Take 300 mg by mouth 2 (two) times a day Take 300 mg in am and 600 mg at bedtime    Historical Provider, MD   gabapentin (NEURONTIN) 300 mg capsule Take 1 capsule (300 mg total) by mouth 3 (three) times a day 5/26/22   Helene Lux MD   gabapentin (NEURONTIN) 300 mg capsule Take 1 capsule (300 mg total) by mouth 2 (two) times a day 9/8/23   Rashmi Wilson MD   gabapentin (NEURONTIN) 600 MG tablet Take 1 tablet (600 mg total) by mouth daily at bedtime  Patient not taking: Reported on 3/27/2023 2/14/23   Edwina Pérez PA-C   HYDROcodone-acetaminophen [facility]) 5-325 mg per tablet Take 1 tablet by mouth every 6 (six) hours as needed for pain Max Daily Amount: 4 tablets 6/2/22   NATHEN Reis   HYDROcodone-acetaminophen (NORCO) 5-325 mg per tablet Take 1 tablet by mouth every 6 (six) hours as needed for pain    Historical Provider, MD   hydrocortisone (ANUSOL-HC) 25 mg suppository Insert 25 mg into the rectum 2 (two) times a day as needed    Historical Provider, MD   isosorbide mononitrate (IMDUR) 30 mg 24 hr tablet Take 1 tablet (30 mg total) by mouth daily 5/26/22   Helene Lux MD   isosorbide mononitrate (IMDUR) 30 mg 24 hr tablet TAKE 1 TABLET DAILY 1/17/23   Rakesh Butcher MD   isosorbide mononitrate (IMDUR) 30 mg 24 hr tablet Take 30 mg by mouth daily    Historical Provider, MD   isosorbide mononitrate (IMDUR) 30 mg 24 hr tablet Take 30 mg by mouth daily    Historical Provider, MD   levothyroxine 100 mcg tablet Take 1 tablet (100 mcg total) by mouth daily  Patient not taking: Reported on 4/25/2023 3/1/23   Mir Kincaid MD   levothyroxine 100 mcg tablet Take 1 tablet (100 mcg total) by mouth daily 9/8/23   Rashmi Wilson MD   levothyroxine 112 mcg tablet Take 112 mcg by mouth daily  9/30/19   Historical Provider, MD   levothyroxine 112 mcg tablet Take 1 tablet (112 mcg total) by mouth daily in the early morning 5/27/22   Helene Lux MD   lidocaine (LIDODERM) 5 % Apply 1 patch topically as needed Remove & Discard patch within 12 hours or as directed by MD     Historical Provider, MD   lidocaine (LIDODERM) 5 % Apply 1 patch topically daily Remove & Discard patch within 12 hours or as directed by MD 5/26/22   Sandi Brito MD   lidocaine (LIDODERM) 5 % Apply 1 patch topically daily Remove & Discard patch within 12 hours or as directed by MD [place] Provider, MD   Lidocaine HCl (Aspercreme Lidocaine) 4 % CREA Apply topically every 6 (six) hours as needed    Historical Provider, MD   loperamide (IMODIUM A-D) 2 MG tablet Take 1 tablet (2 mg total) by mouth 3 (three) times a day as needed for diarrhea 9/8/23   Stephanie Lerma MD   Lumigan 0.01 % ophthalmic drops Administer 1 drop to both eyes daily at bedtime   2/19/22   Historical Provider, MD   methenamine hippurate (HIPREX) 1 g tablet Take 1 g by mouth 2 (two) times a day with meals    Historical Provider, MD   methenamine hippurate (HIPREX) 1 g tablet Take 1 g by mouth 2 (two) times a day with meals    Historical Provider, MD   methocarbamol (ROBAXIN) 500 mg tablet Take 500 mg by mouth 3 (three) times a day as needed for muscle spasms    Historical Provider, MD   methocarbamol (ROBAXIN) 500 mg tablet Take 1 tablet (500 mg total) by mouth every 6 (six) hours as needed for muscle spasms 2/14/23   Cm Pérez PA-C   methocarbamol (ROBAXIN) 750 mg tablet Take 1 tablet (750 mg total) by mouth every 6 (six) hours as needed for muscle spasms 5/26/22   Sandi Brito MD   metolazone (ZAROXOLYN) 5 mg tablet Take 1 tablet (5 mg total) by mouth as needed (if gains 2lbs in 2 days.)  Patient not taking: Reported on 8/27/2021 11/6/19   Trever Dumont MD   metoprolol tartrate (LOPRESSOR) 25 mg tablet Take 25 mg by mouth every 12 (twelve) hours    Historical Provider, MD   metoprolol tartrate (LOPRESSOR) 25 mg tablet TAKE 1 TABLET TWICE A DAY 11/2/23   Efren Bernard DO   metoprolol tartrate (LOPRESSOR) 25 mg tablet Take 25 mg by mouth every 12 (twelve) hours    Historical Provider, MD Multiple Vitamin (multivitamin) capsule Take 1 capsule by mouth daily  Patient not taking: Reported on 10/15/2021     Historical Provider, MD   Multiple Vitamins-Calcium (MULTI-DAY/CALCIUM/EXTRA IRON PO) Take 1 tablet by mouth daily    Historical Provider, MD   Multiple Vitamins-Minerals (multivitamin with iron-minerals) liquid Take 15 mL by mouth daily 5/26/22   Jeanna Hwang, MD   nitroglycerin (NITROSTAT) 0.4 mg SL tablet  1/29/20   Historical Provider, MD   nystatin (MYCOSTATIN) powder Apply topically 2 (two) times a day 2/14/23   Karina Pérez PA-C   polyethylene glycol (MIRALAX) 17 g packet Take 17 g by mouth daily Do not start before February 15, 2023. 2/15/23   Ana Dietrich PA-C   predniSONE 5 mg tablet 5 mg as needed    Historical Provider, MD   predniSONE 5 mg tablet Take by mouth daily    Historical Provider, MD   Red Yeast Rice Extract (RED YEAST RICE PO) Take 600 mg by mouth daily    Historical Provider, MD   senna (SENOKOT) 8.6 mg Take 1 tablet (8.6 mg total) by mouth daily 5/26/22   Jeanna Hwang, MD reno (SENOKOT) 8.6 mg Take 2 tablets (17.2 mg total) by mouth daily Do not start before February 15, 2023. Patient not taking: Reported on 3/27/2023 2/15/23   Karina Pérez PA-C   sertraline (ZOLOFT) 25 mg tablet 25 mg daily at bedtime  5/12/18   Historical Provider, MD   sertraline (ZOLOFT) 25 mg tablet Take 1 tablet (25 mg total) by mouth daily 5/26/22   Jeanna Hwang MD   sertraline (ZOLOFT) 25 mg tablet Take 25 mg by mouth daily    Historical Provider, MD   sertraline (ZOLOFT) 50 mg tablet Take 50 mg by mouth daily at bedtime  Patient not taking: Reported on 4/25/2023    Historical Provider, MD   torsemide (DEMADEX) 20 mg tablet Take 1 tablet (20 mg total) by mouth 2 (two) times a day May take one extra dose as needed for wt gain,increased sob or increased edema.  6/7/22   Abel Paredes,    torsemide (DEMADEX) 20 mg tablet Take 2 tablets (40 mg total) by mouth 2 (two) times a day 3/1/23   Ramiro Willson MD   torsemide [facility]) 20 mg tablet Take 20 mg by mouth 2 (two) times a day    Historical Provider, MD   torsemide 40 MG TABS Take 40 mg by mouth daily 5/26/22   Han Joe MD     I have reviewed home medications with patient personally. Allergies: Allergies   Allergen Reactions    Duloxetine Hcl Other (See Comments) and Hypertension    Duloxetine Hcl      Cymbalta; Hemorrhagic stroke listed as reaction    Duloxetine Hcl Hemorrhagic stroke and Hypertension    Escitalopram      Other reaction(s): Urinary Retention    Pregabalin      Other reaction(s): Hypertension    Statins Myalgia    Tramadol      Other reaction(s): Hypertension    Triprolidine-Pse Other (See Comments)    Anastrozole Abdominal Pain    Anastrozole     Anastrozole Other (See Comments)     Other     Antihistamines, Diphenhydramine-Type     Exemestane      Aromasin; Muscle pain & cramps    Exemestane Other (See Comments)     Other     Lexapro [Escitalopram]      Urinary retention    Lexapro [Escitalopram] Other (See Comments)     Urinary retention    Lyrica [Pregabalin]      hypertension    Lyrica [Pregabalin] Hypertension    Metformin      diarrhea    Metformin Other (See Comments)     Other       Oxycodone      Pt unsure      Oxycodone Other (See Comments)     Other       Pseudoephedrine Other (See Comments)     Other       Statins      Muscle pain & cramps    Statins Myalgia    Tramadol      hypertension    Tramadol Hypertension    Triprolidine Other (See Comments)     Other       Triprolidine-Pseudoephedrine     Metformin Diarrhea    Metolazone Other (See Comments)     Dizzyness, hypotension       Social History:     Marital Status:     Occupation: Retired  Patient Pre-hospital Diet Restrictions: Denies  Substance Use History:   Social History     Substance and Sexual Activity   Alcohol Use Not Currently    Comment: rarely, 1 glass wine per month     Social History Tobacco Use   Smoking Status Never   Smokeless Tobacco Never     Social History     Substance and Sexual Activity   Drug Use Not Currently       Family History:    Family History   Problem Relation Age of Onset    Diabetes Mother     Heart attack Mother     Heart attack Father     Heart disease Mother     Arthritis Mother     Heart failure Father     Arthritis Father     Other Father         enlargement of prostate     Dementia Father     No Known Problems Daughter     No Known Problems Maternal Grandmother     No Known Problems Maternal Grandfather     No Known Problems Paternal Grandmother     No Known Problems Paternal Grandfather     No Known Problems Maternal Aunt     No Known Problems Maternal Aunt     No Known Problems Maternal Aunt     No Known Problems Maternal Aunt     No Known Problems Paternal Aunt     No Known Problems Paternal Aunt     Prostate cancer Son     Prostate cancer Son        Physical Exam:     Vitals:   Blood Pressure: 139/62 (11/18/23 1637)  Pulse: 56 (11/18/23 1637)  Temperature: 98 °F (36.7 °C) (11/18/23 1536)  Temp Source: Oral (11/18/23 1536)  Respirations: 20 (11/18/23 1637)  SpO2: 95 % (11/18/23 1637)    Physical Exam  Constitutional:       Appearance: Normal appearance. Cardiovascular:      Rate and Rhythm: Normal rate and regular rhythm. Heart sounds: No murmur heard. Pulmonary:      Effort: Pulmonary effort is normal. No respiratory distress. Breath sounds: Normal breath sounds. No wheezing or rhonchi. Musculoskeletal:         General: No swelling or tenderness. Cervical back: Normal range of motion and neck supple. Neurological:      Mental Status: She is alert and oriented to person, place, and time. Cranial Nerves: No cranial nerve deficit. Sensory: No sensory deficit. Motor: Weakness present. Psychiatric:         Mood and Affect: Mood normal.             Additional Data:     Lab Results: I have personally reviewed pertinent reports. Results from last 7 days   Lab Units 11/18/23  1520   WBC Thousand/uL 6.88   HEMOGLOBIN g/dL 12.3   HEMATOCRIT % 38.7   PLATELETS Thousands/uL 151     Results from last 7 days   Lab Units 11/18/23  1520   SODIUM mmol/L 138   POTASSIUM mmol/L 4.3   CHLORIDE mmol/L 96   CO2 mmol/L 34*   BUN mg/dL 57*   CREATININE mg/dL 1.41*   ANION GAP mmol/L 8   CALCIUM mg/dL 9.3   GLUCOSE RANDOM mg/dL 129     Results from last 7 days   Lab Units 11/18/23  1611   INR  0.94     Results from last 7 days   Lab Units 11/18/23  1533 11/18/23  1517   POC GLUCOSE mg/dl 129 130         Results from last 7 days   Lab Units 11/18/23  1520   PROCALCITONIN ng/ml 0.10       Imaging: I have personally reviewed pertinent reports. CTA stroke alert (head/neck)   Final Result by Marion Philip MD (11/18 1547)      No hemodynamically significant stenosis, dissection or occlusion of the carotid or vertebral arteries or major vessels of the [place]               Findings were directly discussed with Judith Ramos at  3:45 PM middle dose. Workstation performed: [id number]         CT stroke alert brain   Final Result by Marion Philip MD (11/18 1547)      No evidence of acute infarct, intracranial hemorrhage or mass. Findings were directly discussed with Judith Ramos at  3:40 PM  .      Workstation performed: [id number]         XR chest 1 view portable    (Results Pending)   MRI Inpatient Order    (Results Pending)           ** Please Note: This note has been constructed using a voice recognition system.  **

## 2023-11-19 ENCOUNTER — APPOINTMENT (OUTPATIENT)
Dept: RADIOLOGY | Facility: HOSPITAL | Age: 88
DRG: 064 | End: 2023-11-19
Payer: COMMERCIAL

## 2023-11-19 LAB
4HR DELTA HS TROPONIN: 0 NG/L
ALBUMIN SERPL BCP-MCNC: 3.7 G/DL (ref 3.5–5)
ALP SERPL-CCNC: 64 U/L (ref 34–104)
ALT SERPL W P-5'-P-CCNC: 17 U/L (ref 7–52)
ANION GAP SERPL CALCULATED.3IONS-SCNC: 12 MMOL/L
AST SERPL W P-5'-P-CCNC: 20 U/L (ref 13–39)
ATRIAL RATE: 131 BPM
BASOPHILS # BLD AUTO: 0.06 THOUSANDS/ÂΜL (ref 0–0.1)
BASOPHILS NFR BLD AUTO: 1 % (ref 0–1)
BILIRUB SERPL-MCNC: 0.38 MG/DL (ref 0.2–1)
BUN SERPL-MCNC: 58 MG/DL (ref 5–25)
CALCIUM SERPL-MCNC: 9.1 MG/DL (ref 8.4–10.2)
CARDIAC TROPONIN I PNL SERPL HS: 35 NG/L
CHLORIDE SERPL-SCNC: 93 MMOL/L (ref 96–108)
CHOLEST SERPL-MCNC: 444 MG/DL
CO2 SERPL-SCNC: 33 MMOL/L (ref 21–32)
CREAT SERPL-MCNC: 1.52 MG/DL (ref 0.6–1.3)
EOSINOPHIL # BLD AUTO: 0.01 THOUSAND/ÂΜL (ref 0–0.61)
EOSINOPHIL NFR BLD AUTO: 0 % (ref 0–6)
ERYTHROCYTE [DISTWIDTH] IN BLOOD BY AUTOMATED COUNT: 15.9 % (ref 11.6–15.1)
EST. AVERAGE GLUCOSE BLD GHB EST-MCNC: 154 MG/DL
GFR SERPL CREATININE-BSD FRML MDRD: 30 ML/MIN/1.73SQ M
GLUCOSE SERPL-MCNC: 199 MG/DL (ref 65–140)
HBA1C MFR BLD: 7 %
HCT VFR BLD AUTO: 38.2 % (ref 34.8–46.1)
HDLC SERPL-MCNC: 51 MG/DL
HGB BLD-MCNC: 12.1 G/DL (ref 11.5–15.4)
IMM GRANULOCYTES # BLD AUTO: 0.05 THOUSAND/UL (ref 0–0.2)
IMM GRANULOCYTES NFR BLD AUTO: 1 % (ref 0–2)
LDLC SERPL DIRECT ASSAY-MCNC: 181 MG/DL (ref 0–100)
LYMPHOCYTES # BLD AUTO: 1.75 THOUSANDS/ÂΜL (ref 0.6–4.47)
LYMPHOCYTES NFR BLD AUTO: 25 % (ref 14–44)
MCH RBC QN AUTO: 35.1 PG (ref 26.8–34.3)
MCHC RBC AUTO-ENTMCNC: 31.7 G/DL (ref 31.4–37.4)
MCV RBC AUTO: 111 FL (ref 82–98)
MONOCYTES # BLD AUTO: 0.37 THOUSAND/ÂΜL (ref 0.17–1.22)
MONOCYTES NFR BLD AUTO: 5 % (ref 4–12)
NEUTROPHILS # BLD AUTO: 4.87 THOUSANDS/ÂΜL (ref 1.85–7.62)
NEUTS SEG NFR BLD AUTO: 68 % (ref 43–75)
NRBC BLD AUTO-RTO: 0 /100 WBCS
PLATELET # BLD AUTO: 166 THOUSANDS/UL (ref 149–390)
PMV BLD AUTO: 10.1 FL (ref 8.9–12.7)
POTASSIUM SERPL-SCNC: 3.9 MMOL/L (ref 3.5–5.3)
PROT SERPL-MCNC: 7.2 G/DL (ref 6.4–8.4)
QRS AXIS: -70 DEGREES
QRSD INTERVAL: 84 MS
QT INTERVAL: 524 MS
QTC INTERVAL: 536 MS
RBC # BLD AUTO: 3.45 MILLION/UL (ref 3.81–5.12)
SODIUM SERPL-SCNC: 138 MMOL/L (ref 135–147)
T WAVE AXIS: 251 DEGREES
TRIGL SERPL-MCNC: 1246 MG/DL
VENTRICULAR RATE: 63 BPM
WBC # BLD AUTO: 7.11 THOUSAND/UL (ref 4.31–10.16)

## 2023-11-19 PROCEDURE — 97167 OT EVAL HIGH COMPLEX 60 MIN: CPT

## 2023-11-19 PROCEDURE — 97535 SELF CARE MNGMENT TRAINING: CPT

## 2023-11-19 PROCEDURE — 99232 SBSQ HOSP IP/OBS MODERATE 35: CPT | Performed by: STUDENT IN AN ORGANIZED HEALTH CARE EDUCATION/TRAINING PROGRAM

## 2023-11-19 PROCEDURE — 97163 PT EVAL HIGH COMPLEX 45 MIN: CPT

## 2023-11-19 PROCEDURE — 85025 COMPLETE CBC W/AUTO DIFF WBC: CPT | Performed by: INTERNAL MEDICINE

## 2023-11-19 PROCEDURE — 83036 HEMOGLOBIN GLYCOSYLATED A1C: CPT | Performed by: INTERNAL MEDICINE

## 2023-11-19 PROCEDURE — 83721 ASSAY OF BLOOD LIPOPROTEIN: CPT | Performed by: INTERNAL MEDICINE

## 2023-11-19 PROCEDURE — 93010 ELECTROCARDIOGRAM REPORT: CPT | Performed by: INTERNAL MEDICINE

## 2023-11-19 PROCEDURE — 70551 MRI BRAIN STEM W/O DYE: CPT

## 2023-11-19 PROCEDURE — 92610 EVALUATE SWALLOWING FUNCTION: CPT

## 2023-11-19 PROCEDURE — 80053 COMPREHEN METABOLIC PANEL: CPT | Performed by: INTERNAL MEDICINE

## 2023-11-19 PROCEDURE — 80061 LIPID PANEL: CPT | Performed by: INTERNAL MEDICINE

## 2023-11-19 RX ORDER — HYDROCODONE BITARTRATE AND ACETAMINOPHEN 5; 325 MG/1; MG/1
1 TABLET ORAL EVERY 6 HOURS PRN
Status: DISCONTINUED | OUTPATIENT
Start: 2023-11-19 | End: 2023-11-28 | Stop reason: HOSPADM

## 2023-11-19 RX ORDER — PRAVASTATIN SODIUM 10 MG
10 TABLET ORAL
Status: DISCONTINUED | OUTPATIENT
Start: 2023-11-19 | End: 2023-11-28 | Stop reason: HOSPADM

## 2023-11-19 RX ADMIN — GABAPENTIN 300 MG: 300 CAPSULE ORAL at 21:26

## 2023-11-19 RX ADMIN — PREDNISONE 5 MG: 5 TABLET ORAL at 09:38

## 2023-11-19 RX ADMIN — ALLOPURINOL 150 MG: 300 TABLET ORAL at 09:38

## 2023-11-19 RX ADMIN — PRAVASTATIN SODIUM 10 MG: 10 TABLET ORAL at 15:51

## 2023-11-19 RX ADMIN — METOPROLOL TARTRATE 25 MG: 25 TABLET, FILM COATED ORAL at 09:38

## 2023-11-19 RX ADMIN — CEFTRIAXONE SODIUM 1000 MG: 10 INJECTION, POWDER, FOR SOLUTION INTRAVENOUS at 15:52

## 2023-11-19 RX ADMIN — ISOSORBIDE MONONITRATE 30 MG: 30 TABLET, EXTENDED RELEASE ORAL at 09:38

## 2023-11-19 RX ADMIN — HEPARIN SODIUM 5000 UNITS: 5000 INJECTION INTRAVENOUS; SUBCUTANEOUS at 15:51

## 2023-11-19 RX ADMIN — HEPARIN SODIUM 5000 UNITS: 5000 INJECTION INTRAVENOUS; SUBCUTANEOUS at 21:26

## 2023-11-19 RX ADMIN — POLYETHYLENE GLYCOL 3350 17 G: 17 POWDER, FOR SOLUTION ORAL at 09:39

## 2023-11-19 RX ADMIN — ASPIRIN 81 MG CHEWABLE TABLET 81 MG: 81 TABLET CHEWABLE at 09:39

## 2023-11-19 RX ADMIN — LEVOTHYROXINE SODIUM 100 MCG: 100 TABLET ORAL at 05:07

## 2023-11-19 RX ADMIN — GABAPENTIN 300 MG: 300 CAPSULE ORAL at 09:38

## 2023-11-19 RX ADMIN — SENNOSIDES 8.6 MG: 8.6 TABLET, FILM COATED ORAL at 09:38

## 2023-11-19 RX ADMIN — METOPROLOL TARTRATE 25 MG: 25 TABLET, FILM COATED ORAL at 21:26

## 2023-11-19 RX ADMIN — METHOCARBAMOL 750 MG: 750 TABLET ORAL at 13:16

## 2023-11-19 RX ADMIN — CLOPIDOGREL BISULFATE 75 MG: 75 TABLET ORAL at 09:38

## 2023-11-19 RX ADMIN — TORSEMIDE 20 MG: 20 TABLET ORAL at 21:26

## 2023-11-19 RX ADMIN — HYDROCODONE BITARTRATE AND ACETAMINOPHEN 1 TABLET: 5; 325 TABLET ORAL at 21:32

## 2023-11-19 RX ADMIN — SERTRALINE 25 MG: 25 TABLET, FILM COATED ORAL at 09:38

## 2023-11-19 RX ADMIN — TORSEMIDE 20 MG: 20 TABLET ORAL at 09:38

## 2023-11-19 RX ADMIN — HEPARIN SODIUM 5000 UNITS: 5000 INJECTION INTRAVENOUS; SUBCUTANEOUS at 05:07

## 2023-11-19 NOTE — PLAN OF CARE
Problem: PAIN - ADULT  Goal: Verbalizes/displays adequate comfort level or baseline comfort level  Description: Interventions:  - Encourage patient to monitor pain and request assistance  - Assess pain using appropriate pain scale  - Administer analgesics based on type and severity of pain and evaluate response  - Implement non-pharmacological measures as appropriate and evaluate response  - Consider cultural and social influences on pain and pain management  - Notify physician/advanced practitioner if interventions unsuccessful or patient reports new pain  Outcome: Progressing     Problem: SAFETY ADULT  Goal: Patient will remain free of falls  Description: INTERVENTIONS:  - Educate patient/family on patient safety including physical limitations  - Instruct patient to call for assistance with activity   - Consult OT/PT to assist with strengthening/mobility   - Keep Call bell within reach  - Keep bed low and locked with side rails adjusted as appropriate  - Keep care items and personal belongings within reach  - Initiate and maintain comfort rounds  - Make Fall Risk Sign visible to staff  - Offer Toileting every 2 Hours, in advance of need  - Initiate/Maintain bed alarm  - Obtain necessary fall risk management equipment: yellow bracelet, yellow sock, call bell, bed alarm  - Apply yellow socks and bracelet for high fall risk patients  - Consider moving patient to room near nurses station  Outcome: Progressing     Problem: Knowledge Deficit  Goal: Patient/family/caregiver demonstrates understanding of disease process, treatment plan, medications, and discharge instructions  Description: Complete learning assessment and assess knowledge base.   Interventions:  - Provide teaching at level of understanding  - Provide teaching via preferred learning methods  Outcome: Progressing     Problem: NEUROSENSORY - ADULT  Goal: Achieves stable or improved neurological status  Description: INTERVENTIONS  - Monitor and report changes in neurological status  - Monitor vital signs such as temperature, blood pressure, glucose, and any other labs ordered   - Initiate measures to prevent increased intracranial pressure  - Monitor for seizure activity and implement precautions if appropriate      Outcome: Progressing  Goal: Remains free of injury related to seizures activity  Description: INTERVENTIONS  - Maintain airway, patient safety  and administer oxygen as ordered  - Monitor patient for seizure activity, document and report duration and description of seizure to physician/advanced practitioner  - If seizure occurs,  ensure patient safety during seizure  - Reorient patient post seizure  - Seizure pads on all 4 side rails  - Instruct patient/family to notify RN of any seizure activity including if an aura is experienced  - Instruct patient/family to call for assistance with activity based on nursing assessment  - Administer anti-seizure medications if ordered    Outcome: Progressing  Goal: Achieves maximal functionality and self care  Description: INTERVENTIONS  - Monitor swallowing and airway patency with patient fatigue and changes in neurological status  - Encourage and assist patient to increase activity and self care.    - Encourage visually impaired, hearing impaired and aphasic patients to use assistive/communication devices  Outcome: Progressing     Problem: SKIN/TISSUE INTEGRITY - ADULT  Goal: Incision(s), wounds(s) or drain site(s) healing without S/S of infection  Description: INTERVENTIONS  - Assess and document dressing, incision, wound bed, drain sites and surrounding tissue  - Provide patient and family education  - Perform skin care every incont, and shift  Outcome: Progressing  Goal: Pressure injury heals and does not worsen  Description: Interventions:  - Implement low air loss mattress or specialty surface (Criteria met)  - Apply silicone foam dressing  - Instruct/assist with weight shifting every 30 minutes when in chair - Limit chair time to 2 hour intervals  - Use special pressure reducing interventions such as donut when in chair   - Apply fecal or urinary incontinence containment device     - Turn and reposition patient & offload bony prominences every 2 hours   - Utilize friction reducing device or surface for transfers   - Consider consults to  interdisciplinary teams such as pt/ot, wound care    Outcome: Progressing     Problem: MUSCULOSKELETAL - ADULT  Goal: Maintain or return mobility to safest level of function  Description: INTERVENTIONS:  - Assess patient's ability to carry out ADLs; assess patient's baseline for ADL function and identify physical deficits which impact ability to perform ADLs (bathing, care of mouth/teeth, toileting, grooming, dressing, etc.)  - Assess/evaluate cause of self-care deficits   - Assess range of motion  - Assess patient's mobility  - Assess patient's need for assistive devices and provide as appropriate  - Encourage maximum independence but intervene and supervise when necessary  - Involve family in performance of ADLs  - Assess for home care needs following discharge   - Consider OT consult to assist with ADL evaluation and planning for discharge  - Provide patient education as appropriate  Outcome: Progressing

## 2023-11-19 NOTE — OCCUPATIONAL THERAPY NOTE
Occupational Therapy Evaluation and Treatment     Patient Name: Carlie Lanes  Today's Date: 11/19/2023  Problem List  Principal Problem:    Stroke-like symptoms  Active Problems:    Hypothyroidism    SLE (systemic lupus erythematosus) (HCC)    Stage 4 chronic kidney disease (HCC)    CHF (congestive heart failure) (HCC)    Rheumatoid arthritis (HCC)    Type 2 diabetes mellitus ([address])    Hyperlipidemia    Lethargy    Goals of care, counseling/discussion    Past Medical History  Past Medical History:   Diagnosis Date    Arthritis     Breast cancer ([address]) 09/08/2015    BILATERAL    Cardiac disease     aortic valve transplant    CHF (congestive heart failure) (HCC)     Chronic kidney disease     Compression fracture of body of thoracic vertebra (HCC)     CVA (cerebral vascular accident) ([address])     Diabetes mellitus ([address])     Disease of thyroid gland     Fibromyalgia, primary     Gout     H/O cervical fracture 01/09/2019    Hyperlipidemia     Hypertension     Neuropathy     AZUL (obstructive sleep apnea) 10/19/2019    Pressure injury of skin     Renal disorder     kidney stent    Stroke [facility])     UTI (urinary tract infection)     colinized bacteria.      Past Surgical History  Past Surgical History:   Procedure Laterality Date    AORTIC VALVE SURGERY      BREAST LUMPECTOMY Right 09/08/2015    BREAST LUMPECTOMY Left 09/08/2015    BREAST SURGERY      lumpectomy for breast cancer    BREAST SURGERY      CARDIAC VALVE REPLACEMENT      CATARACT EXTRACTION      CERVICAL SPINE SURGERY      CHOLECYSTECTOMY      FACIAL/NECK BIOPSY Right 9/24/2020    Procedure: EXCISION CHEEK LESION X2 WITH FROZEN SECTION;  Surgeon: Mira Frey DO;  Location: [address] [place] [address];  Service: [address] [place] [address]    IR THORACENTESIS  2/28/2020    JOINT REPLACEMENT      KIDNEY SURGERY      stent placed for kidney    KNEE SURGERY      OTHER SURGICAL HISTORY      abdominal aneurysm surgery    RI ARTHRODESIS POSTERIOR ATLAS-AXIS C1-C2 N/A 5/1/2019 Procedure: C1-C2 lateral mass fixation fusion with possible sublaminar cables;  Surgeon: Stephen Peterson MD;  [facility]: [facility];  Service: Neurosurgery    REPLACEMENT TOTAL KNEE      left            11/19/23 0941   OT Last Visit   OT Visit Date 11/19/23   Note Type   Note type Evaluation  (and treatment)   Pain Assessment   Pain Assessment Tool 0-10   Pain Score No Pain   Restrictions/Precautions   Weight Bearing Precautions Per Order No   Other Precautions Chair Alarm; Bed Alarm;Multiple lines; Fall Risk   Home Living   Type of Home Apartment  (in-law suite attached to daughter's house)   Home Layout One level;Performs ADLs on one level; Able to live on main level with bedroom/bathroom; Ramped entrance   [place] Grab bars in shower; Shower chair;Grab bars around toilet   Home Equipment Walker;Cane  (Pt uses RW at baseline.)   Prior Function   Level of [place] Independent with ADLs; Independent with functional mobility; Needs assistance with IADLS   Lives With Daughter  (+ son-in-law and three granchildren)   Receives Help From Family   IADLs Family/Friend/Other provides transportation; Family/Friend/Other provides meals; Family/Friend/Other provides medication management   Falls in the last 6 months 1 to 4   Vocational Retired   Lifestyle   Autonomy Pt reports I w/ ADLs and fxnl mobility w/ RW and requires assist w/ IADLs at baseline; - driving. Reciprocal Relationships Pt lives in an in-law suite attached to her daughter and son-in-law's house. Service to Others Pt is retired. Intrinsic Gratification Pt enjoys watching TV. General   Family/Caregiver Present No   ADL   Eating Assistance 7  Independent   Grooming Assistance 5  Supervision/Setup   UB Bathing Assistance 4  Minimal Assistance   LB Bathing Assistance 2  Maximal Assistance   UB Dressing Assistance 4  Minimal Assistance   UB Dressing Deficit Setup;Verbal cueing; Increased time to complete; Thread RUE; Thread LUE;Pull around back; Fasteners   LB Dressing Assistance 2  Maximal Assistance   Toileting Assistance  2  Maximal Assistance  (Pt incontinent of bowel upon therapist's arrival seated OOB in chair. Pt required max A for hygiene while standing w/ max Ax2. Pt transferred to supine to finish perineal hygiene due to limited standing tolerance. See tx note below.)   Toileting Deficit Setup; Clothing management up;Clothing management down;Perineal hygiene   Bed Mobility   Rolling R 3  Moderate assistance   Additional items Assist x 1;Bedrails; Increased time required;Verbal cues   Rolling L 4  Minimal assistance   Additional items Assist x 1;Bedrails; Increased time required;Verbal cues   Supine to Sit Unable to assess   Sit to Supine 2  Maximal assistance   Additional items Assist x 2; Increased time required;Verbal cues;LE management   Additional Comments Pt seated OOB in chair upon therapist's arrival. Pt supine in bed at end of OT tx session. Transfers   Sit to Stand 2  Maximal assistance   Additional items Assist x 2; Increased time required;Verbal cues   Stand to Sit 2  Maximal assistance   Additional items Assist x 2; Increased time required;Verbal cues   Stand pivot 2  Maximal assistance   Additional items Assist x 2; Increased time required;Verbal cues   Additional Comments x2 STS transfers completed w/ b/l HHA   Functional Mobility   Additional Comments NT; will continue to assess   Balance   Static Sitting Fair -   Dynamic Sitting Poor +   Static Standing Poor   Dynamic Standing Poor -   Ambulatory Zero   Activity Tolerance   Activity Tolerance Patient limited by fatigue   Medical Staff Made Aware PT, Kamryn Kelley, and SPT, Austine Libman, due to pt's medical complexity and multiple comorbidities   Nurse Made Aware RN clearance prior to session   RUE Assessment   RUE Assessment WFL   LUE Assessment   LUE Assessment X  (premorbid L rotator cuff injury; <90 degrees shoulder flexion)   Hand Function Gross Motor Coordination   (R UE functional; L UE impaired)   Fine Motor Coordination Functional   Cognition   Arousal/Participation Alert; Responsive; Cooperative   Attention Attends with cues to redirect   Orientation Level Oriented X4   Following Commands Follows one step commands without difficulty   Comments Pt was pleasant, cooperative, and willing to participate in OT evaluation and immediate OT tx session. Assessment   Limitation Decreased ADL status; Decreased UE ROM; Decreased UE strength;Decreased endurance;Decreased self-care trans;Decreased high-level ADLs   Assessment Pt is a 80 y.o. female seen for OT evaluation s/p admission to [facility] on 11/18/2023 due to L-sided weakness. Pt diagnosed with Stroke-like symptoms. Pt has a significant PMH impacting occupational performance including: Arthritis, Breast cancer ([address]), Cardiac disease, CHF (congestive heart failure) ([address]), Chronic kidney disease, Compression fracture of body of thoracic vertebra ([address]), CVA (cerebral vascular accident) ([address]), Diabetes mellitus ([address]), Disease of thyroid gland, Fibromyalgia, primary, Gout, H/O cervical fracture, Hyperlipidemia, Hypertension, Neuropathy, AZUL (obstructive sleep apnea), Pressure injury of skin, Renal disorder, Stroke ([address]), and UTI (urinary tract infection). Pt with active OT evaluation and treatment orders and activity orders. PTA, pt living in an in-law suite attached to her daughter's house w/ a ramped entrance. Pt reports I w/ ADLs and fxnl mobility using RW and requires assist w/ IADLs at baseline; - driving. Pt agreeable and willing to participate in OT evaluation. During evaluation, pt was I fro eating, S for grooming, min A for grooming, and max A for LB ADLs and toileting. Pt also required max Ax2 for STS transfers.  Performance deficits that affect the pt’s occupational performance during the initial evaluation include decreased ADL status, decreased activity tolerance, decreased endurance, decreased sitting tolerance, decreased sitting balance, decreased standing tolerance, decreased standing balance, decreased transfer skills, and decreased fxnl mobility. Based on pt’s functional performance and deficits the following occupations will be addressed in OT treatments in order to maximize pt’s independence and overall occupational performance: grooming, bathing/showering, toileting and toilet hygiene, dressing, and functional mobility. Goals are listed below. From OT perspective, recommend d/c to [facility] when pt medically stable to d/c from acute care. Will continue to follow. Goals   Patient Goals to get cleaned up   LTG Time Frame 10-14   Plan   Treatment Interventions ADL retraining;Functional transfer training;UE strengthening/ROM; Endurance training;Patient/family training;Equipment evaluation/education; Compensatory technique education;Continued evaluation; Energy conservation; Activityengagement   Goal Expiration Date 12/03/23   OT Treatment Day 0   OT Frequency 2-3x/wk   Discharge Recommendation   Rehab Resource Intensity Level, OT II (Moderate Resource Intensity)   AM-PAC Daily Activity Inpatient   Lower Body Dressing 2   Bathing 2   Toileting 2   Upper Body Dressing 3   Grooming 3   Eating 4   Daily Activity Raw Score 16   Daily Activity Standardized Score (Calc for Raw Score >=11) 35.96   AM-PAC Applied Cognition Inpatient   Following a Speech/Presentation 3   Understanding Ordinary Conversation 4   Taking Medications 4   Remembering Where Things Are Placed or Put Away 4   Remembering List of 4-5 Errands 3   Taking Care of Complicated Tasks 3   Applied Cognition Raw Score 21   Applied Cognition Standardized Score 44.3   Barthel Index   Feeding 10   Bathing 0   Grooming Score 0   Dressing Score 5   Bladder Score 5   Bowels Score 5   Toilet Use Score 5   Transfers (Bed/Chair) Score 5   Mobility (Level Surface) Score 0   Stairs Score 0   Barthel Index Score 35 Additional Treatment Session   Start Time 0930   End Time 0941   Treatment Assessment Pt seen for an immediate, skilled OT treatment session from 0930 to 7994 w/ interventions focusing on ADL participation, activity tolerance, sitting tolerance, sitting balance, standing tolerance, standing balance, bed mobility , and transfer skills. Pt was agreeable and willing to participate in session. Pt engaged in the following tasks: max Ax2 for SPT from recliner chair to EOB, max Ax2 for sit > supine bed mobility, min A for UB dressing, max A for toileting. Pt demonstrated limited standing tolerance from chair, and for that reason, pt transferred to supine in bed to complete perineal hygiene and don clean [facility] gown. Pt continues to be functioning below baseline level as occupational performance remains limited by decreased ADL status, decreased activity tolerance, decreased endurance, decreased sitting tolerance, decreased sitting balance, decreased standing tolerance, decreased standing balance, decreased transfer skills, and decreased fxnl mobility. From OT standpoint, recommendation at time of d/c would be post acute rehab. Pt will benefit from continued OT treatment while in acute care to address deficits as defined above and maximize level of functional independence with ADLs and functional mobility. Pt supine in bed w/ all needs met at end of session. Additional Treatment Day 1       The patient's raw score on the AM-PAC Daily Activity Inpatient Short Form is 16. A raw score of less than 19 suggests the patient may benefit from discharge to post-acute rehabilitation services. Please refer to the recommendation of the Occupational Therapist for safe discharge planning. Goals: (OTR to further assess pt's fxnl mobility)    - Pt will complete UB ADLs w/ mod I to maximize independence and return home. - Pt will complete LB ADLs w/ min A to maximize independence and return home.      - Pt will complete toileting routine (transfers, hygiene, and clothing management) w/ min A to maximize independence and return to prior level of function.    - Pt will complete bed mobility supine >< sit w/ min Ax1 to maximize independence and return home. - Pt will transfer to bed, chair, and toilet w/ min Ax1 using AD / DME as needed to maximize independence and reduce burden of care. - Pt will increase activity tolerance and sitting tolerance by eating all meals OOB in the chair.     - Pt will increase standing tolerance to 5-7 minutes to maximize independence w/ grooming tasks standing at the sink.     - Pt will tolerate therapeutic activities for greater than 30 minutes in order to increase tolerance for functional activities. - Pt will participate in ongoing OT evaluation of cognitive skills to assist with safe d/c planning/recommendations.        Daphnie Ordoñez MS, OTR/L

## 2023-11-19 NOTE — PLAN OF CARE
Problem: PHYSICAL THERAPY ADULT  Goal: Performs mobility at highest level of function for planned discharge setting. See evaluation for individualized goals. Description: Treatment/Interventions: Functional transfer training, LE strengthening/ROM, Therapeutic exercise, Endurance training, Patient/family training, Equipment eval/education, Bed mobility, Gait training, Spoke to nursing, Spoke to case management, OT          See flowsheet documentation for full assessment, interventions and recommendations. Note: Prognosis: Fair  Problem List: Decreased strength, Decreased range of motion, Decreased endurance, Impaired balance, Decreased mobility, Decreased coordination, Decreased cognition, Impaired judgement, Decreased safety awareness, Obesity  Assessment: Pt is a 80 y.o. female seen for PT evaluation s/p admit to [address] [place] [address] on 11/18/2023. Pt was admitted with a primary dx of: stroke-like symptoms. PT now consulted for assessment of mobility and d/c needs. Pt with Up and OOB as tolerated  orders. Pts current comorbidities effecting treatment include: hypothyroidism, SLE, CKD, CHF, RA, type 2 DM, hyperlipidemia, lethargy. Pt  has a past medical history of Arthritis, Breast cancer ([address]) (09/08/2015), Cardiac disease, CHF (congestive heart failure) ([address]), Chronic kidney disease, Compression fracture of body of thoracic vertebra ([address]), CVA (cerebral vascular accident) ([address]), Diabetes mellitus ([address]), Disease of thyroid gland, Fibromyalgia, primary, Gout, H/O cervical fracture (01/09/2019), Hyperlipidemia, Hypertension, Neuropathy, AZUL (obstructive sleep apnea) (10/19/2019), Pressure injury of skin, Renal disorder, Stroke ([address]), and UTI (urinary tract infection).  Pts current clinical presentation is Unstable/ Unpredictable (high complexity) due to Ongoing medical management for primary dx, Increased reliance on more restrictive AD compared to baseline, Decreased activity tolerance compared to baseline, Fall risk, Increased assistance needed from caregiver at current time, Cog status, Trending lab values, Continuous pulse oximetry monitoring . Prior to admission, pt was residing with daughter in 1 level home with ramped entrance and was independent using RW. Upon evaluation, pt currently is requiring max Ax2 for bed mobility; max Ax2 for transfers. Pt presents at PT eval functioning below baseline and currently w/ overall mobility deficits 2* to: BLE weakness, decreased ROM, impaired balance, decreased endurance, gait deviations, decreased activity tolerance compared to baseline, decreased functional mobility tolerance compared to baseline, decreased safety awareness, fall risk. Pt currently at a fall risk 2* to impairments listed above. Pt will continue to benefit from skilled acute PT interventions to address stated impairments; to maximize functional mobility; for ongoing pt/ family training; and DME needs. At conclusion of PT session pt returned BTB and bed alarm engaged with phone and call bell within reach. Pt denies any further questions at this time. The patient's AM-PAC Basic Mobility Inpatient Short Form Raw Score is 7. A Raw score of less than or equal to 16 suggests the patient may benefit from discharge to post-acute rehabilitation services. Please also refer to the recommendation of the Physical Therapist for safe discharge planning. Recommend STR upon hospital D/C. Barriers to Discharge: Inaccessible home environment, Decreased caregiver support     Rehab Resource Intensity Level, PT: II (Moderate Resource Intensity)    See flowsheet documentation for full assessment.

## 2023-11-19 NOTE — CASE MANAGEMENT
Case Management Assessment & Discharge Planning Note    Patient name Tejal Carmona  Location [address] [place] [address] 710/[facility] 710-01 MRN [id number]  : 1934 Date 2023       Current Admission Date: 2023  Current Admission Diagnosis:Stroke-like symptoms   Patient Active Problem List    Diagnosis Date Noted    Stroke-like symptoms 2023    Lethargy 2023    Goals of care, counseling/discussion 2023    Fall 2023    Pressure injury of deep tissue 2023    Pancreatic lesion 2023    Rheumatoid arthritis ([address]) 2023    Type 2 diabetes mellitus ([address]) 2023    History of renal artery stenosis 2023    Family history of gout 2023    Peripheral polyneuropathy 2023    Hypertension 2023    Lupus ([address]) 2023    Hyperlipidemia 2023    History of stroke 2023    Hemorrhoids 2023    Type 2 diabetes mellitus with diabetic neuropathy, without long-term current use of insulin ([address]) 02/15/2023    Primary hypertension 02/15/2023    Class 1 obesity due to excess calories without serious comorbidity with body mass index (BMI) of 33.0 to 33.9 in adult 02/15/2023    Thoracic spine fracture ([address]) 2023    Perineal hematoma 2023    Cervical spine fracture ([address]) 02/10/2023    Contusion 02/10/2023    CHF (congestive heart failure) ([address]) 02/10/2023    CKD (chronic kidney disease) stage 4, GFR 15-29 ml/min ([address]) 02/10/2023    Hypothyroidism 02/10/2023    Fall 2023    Ambulatory dysfunction 2022    Chronic midline low back pain without sciatica 2022    COVID-19 2022    Fall 2022    Fall 2022    Hypothyroidism 2022    DM type 2 (diabetes mellitus, type 2) ([address]) 2022    AZUL (obstructive sleep apnea) 2022    Essential hypertension 2022    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 ([address]) 2022    Renal artery stenosis ([address]) 2022    Lab test positive for detection of COVID-19 virus 05/19/2022    Recurrent UTI 09/28/2021    Multiple drug resistant organism (MDRO) culture positive 09/28/2021    HTN (hypertension), benign 12/14/2020    Chronic gout without tophus 52/18/5847    Toxic metabolic encephalopathy 53/56/0139    Aspiration pneumonia due to regurgitated food ([address]) 09/29/2020    Macrocytic anemia 09/29/2020    Nephrosclerosis arteriolar, stage 1-4 or unspecified chronic kidney disease 05/29/2020    Encounter for follow-up examination after completed treatment for malignant neoplasm 05/19/2020    Seasonal allergic rhinitis due to pollen 03/24/2020    Wheezing 11/04/2019    Stage 4 chronic kidney disease ([address]) 10/29/2019    Alkalosis 10/29/2019    Chronic respiratory failure with hypoxia ([address]) 10/21/2019    Chronic anemia 10/19/2019    SDH (subdural hematoma) ([address]) 10/19/2019    AZUL (obstructive sleep apnea) 10/19/2019    H/O spinal fusion 07/23/2019    Hyponatremia 01/21/2019    Chronic combined systolic and diastolic heart failure ([address]) 01/17/2019    Depression 01/17/2019    Essential hypertension 01/09/2019    Controlled type 2 diabetes mellitus with diabetic neuropathy, without long-term current use of insulin ([address]) 10/24/2017    SLE (systemic lupus erythematosus) ([address]) 10/24/2017    Vitamin D deficiency 08/22/2017    Renal artery stenosis ([address]) 02/08/2017    Peripheral polyneuropathy 02/07/2017    Hypothyroidism 09/23/2016    H/O: CVA (cerebrovascular accident) 09/23/2016    History of malignant neoplasm of breast 09/23/2016    History of aortic valve replacement 07/21/2015    RA (rheumatoid arthritis) ([address]) 07/21/2015    Hyperlipidemia 10/01/2013    Osteoporosis 10/01/2013    Obesity (BMI 30.0-34.9) 10/12/2012      LOS (days): 1  Geometric Mean LOS (GMLOS) (days):   Days to GMLOS:     OBJECTIVE:    Risk of Unplanned Readmission Score: 42.1         Current admission status: Inpatient       Preferred Pharmacy:   [facility]/pharmacy #[id number] Emily, [address]  [address] [place] [address]  Phone: [phone] Fax: Leny Vargas, [address] - [address] [facility]  [address] [facility] [facility]  [place] [address] [facility] [address]  Phone: [phone] Fax: [phone]    [address] [place] [address], [address] [place] [address] [address] [facility]  [address] [facility]  [address] [facility]  [place] [address]  Phone: [phone] Fax: [phone]    PATIENT/FAMILY REPORTS NO PREFERRED PHARMACY  No address on file      Primary Care Provider: Americo Singh DO    Primary Insurance: Lacho Huntley [facility]  Secondary Insurance:     ASSESSMENT:  [address], [address] Representative - Daughter   Primary Phone: [phone] (Mobile)                           Readmission Root Cause  30 Day Readmission: No    Patient Information  Admitted from[de-identified] Home  Mental Status: Alert  During Assessment patient was accompanied by: Daughter  Assessment information provided by[de-identified] Daughter  Primary Caregiver: Self  Support Systems: Daughter, Family members  [place] of Residence: [address] do you live in?: [place]  Type of Current Residence: Apartment  Floor Level: 1  Upon entering residence, is there a bedroom on the main floor (no further steps)?: Yes  Upon entering residence, is there a bathroom on the main floor (no further steps)?: Yes  Living Arrangements: Other (Comment) (in-law suite at daughter's home)  Is patient a ?: No    Activities of Daily Living Prior to Admission  Functional Status: Assistance  Completes ADLs independently?: No  Level of ADL dependence: Assistance  Ambulates independently?: No  Level of ambulatory dependence: Assistance  Does patient use assisted devices?: Yes  Assisted Devices (DME) used: Bedside Commode, Walker, Shower Chair  Does patient currently own DME?: Yes  What DME does the patient currently own?: Bedside Commode, Shower Chair, Walker  Does patient have a history of Outpatient Therapy (PT/OT)?: No  Does the patient have a history of Short-Term Rehab?: No  Does patient have a history of HHC?: Yes Wyatt Edmond)  Does patient currently have [address]?: No         Patient Information Continued  Income Source: Pension/halfway  Does patient have prescription coverage?: Yes  Does patient receive dialysis treatments?: No  Does patient have a history of substance abuse?: No  Does patient have a history of Mental Health Diagnosis?: No         Means of Transportation  Means of Transport to Appts[de-identified] Family transport      Housing Stability: Low Risk  (9/5/2023)    Housing Stability Vital Sign     Unable to Pay for Housing in the Last Year: No     Number of [address] in the Last Year: 1     Unstable Housing in the Last Year: No   Food Insecurity: No Food Insecurity (9/5/2023)    Hunger Vital Sign     Worried About Running Out of Food in the Last Year: Never true     Ran Out of Food in the Last Year: Never true   Transportation Needs: No Transportation Needs (9/5/2023)    PRAPARE - Transportation     Lack of Transportation (Medical): No     Lack of Transportation (Non-Medical): No   Utilities: Not on file       DISCHARGE DETAILS:    Discharge planning discussed with[de-identified] bedsise with daughter  Freedom of Choice: Yes  Comments - Freedom of Choice: Discussed FOC  CM contacted family/caregiver?: No- see comments (daughter was bedside)             Contacts  Patient Contacts: Pam Hyatt  Relationship to Patient[de-identified] Family (daughter)  Contact Method: Phone  Phone Number: [phone]             Additional Comments: HORACE spoke with daughter Nava Littlejohn outside of the room. Nava Littlejohn reported that since pt last hospitalization pt has declined and cntinues to decline. Her mobility is less and less, at this time she is an assist x2. Quentin Lopez are aides that assist in the morning and it has been increasingly difficult to get pt to stand and pivot. Daughter has Veleta Chess regarding her neurological state and if there is something that should be addressed.  Daughter asked about a hospital bed, HORACE is able to order if it is needed. Jamel Obrien is unsure what would happen if she returned. Upon DC in september Jmael Obrien had waiver paperwork and pt did not qualify. She will revisit that paperwork and possibly she would qualify given the significant decline.  CM will pass info to provider

## 2023-11-19 NOTE — ASSESSMENT & PLAN NOTE
Of note, patient is followed by [facility] palliative care. Note patient's outpatient notes. Confirmed with granddaughter that she is a level 3 DNR. Gretta Esteban She has been followed by [place] palliative care due to history of heart failure. We will continue with aggressive medical management and work-up for stroke. Family is agreeable to stroke pathway work-up and recommendations. Patient presents with left-sided weakness which is gradually improving on presentation to the ED.

## 2023-11-19 NOTE — UTILIZATION REVIEW
Initial Clinical Review    Admission: Date/Time/Statement:   Admission Orders (From admission, onward)       Ordered        11/18/23 1721  INPATIENT ADMISSION  Once                          Orders Placed This Encounter   Procedures    INPATIENT ADMISSION     Standing Status:   Standing     Number of Occurrences:   1     Order Specific Question:   Level of Care     Answer:   Med Surg [16]     Order Specific Question:   Estimated length of stay     Answer:   More than 2 Midnights     Order Specific Question:   Certification     Answer:   I certify that inpatient services are medically necessary for this patient for a duration of greater than two midnights. See H&P and MD Progress Notes for additional information about the patient's course of treatment. ED Arrival Information       Expected   -    Arrival   11/18/2023 15:11    Acuity   Immediate              Means of arrival   Ambulance    Escorted by   [facility]    Service   Hospitalist    Admission type   Emergency              Arrival complaint   stroke alert             Chief Complaint   Patient presents with    STROKE Alert     Per ems, patient woke up around noon with ams and lethargy, hx of cvas per patient's daughter. Initial Presentation:   80 yof to ER from home via EMS for increased lethargy, patient was more difficult to arouse, and left-sided weakness. Hx stroke on Plavix, HTN, DM, RA, CHF, dementia, CKD, UTI, renal artery stenosis, subdural hematoma, sleep apnea. Daughter reports URI symptoms about 2 weeks ago and still has a persistent cough. Presents fatigued & weak. Admission work-up showing +u/a  Admitted to inpatient status for stroke-like symptoms. Started on IVABT for suspected UTI, cultures pending. Per neuro:  Impression: Patient with extensive PMH including a stroke in the past that had left sided persistent mild residual deficits.  Suspect recrudescence vs underlying toxic/metabolic encephalopathy versus new stroke. Plan:  - Recommend admit to hospital on acute ischemic stroke pathway  - MRI brain without contrast  - 325mg Aspirin load, followed by daily 81mg aspirin (discontinue if no evidence of new ischemic stroke). - Continue Neuro checks  - Permissive HTN for the first 24 hours up to   - Maintain glucose <180, SSI for coverage if indicated  - Repeat CTH if GCS drops 2pts in 1hr  - Fasting lipid panel & hemoglobin A1c  - PT/OT/ST/PM&R Evaluation  - Echocardiogram  - Continue monitoring on telemetry      Date: 11/19/23   Day 2:   Neuro checks continued for stroke-like symptoms, persistent LLE weakness. MRI ordered. IVABT in progress for UTI      Date: 11/20/23    Day 3: Has surpassed a 2nd midnight with active treatments and services, which include continued IVABT, monitoring neuro status for acute stroke per MRI, therapies, Echo pending. Monitor VS, labs.     ED Triage Vitals   Temperature Pulse Respirations Blood Pressure SpO2   11/18/23 1518 11/18/23 1518 11/18/23 1518 11/18/23 1518 11/18/23 1518   98 °F (36.7 °C) 62 16 135/59 97 %      Temp Source Heart Rate Source Patient Position - Orthostatic VS BP Location FiO2 (%)   11/18/23 1518 11/18/23 1518 11/18/23 1518 11/18/23 1536 --   Oral Monitor Lying Right arm       Pain Score       11/18/23 1536       No Pain          Wt Readings from Last 1 Encounters:   11/18/23 73.9 kg (163 lb)     Additional Vital Signs:   Date/Time Temp Pulse Resp BP MAP (mmHg) SpO2 O2 Device Patient Position - Orthostatic VS   11/19/23 0913 -- -- -- -- -- -- None (Room air) --   11/19/23 0520 -- -- -- -- -- -- None (Room air) --   11/19/23 0330 -- -- -- -- -- 92 % None (Room air) --   11/19/23 0300 -- 61 -- 154/56 89 93 % -- --   11/19/23 0130 -- -- -- -- -- 93 % None (Room air) --   11/19/23 0100 -- 63 -- 125/96 106 94 % -- --   11/19/23 00:45:11 -- 63 -- 125/96 106 94 % -- --   11/18/23 2330 -- -- -- -- -- -- None (Room air) --   11/18/23 2130 -- -- -- -- -- -- None (Room air) -- 11/18/23 21:00:06 98 °F (36.7 °C) 60 16 139/63 88 -- -- Lying   11/18/23 2030 -- -- -- -- -- 94 % None (Room air) --   11/18/23 18:40:02 -- 60 18 153/88 -- 98 % None (Room air) --   11/18/23 16:37:56 -- 56 20 139/62 -- 95 % None (Room air) Lying   11/18/23 1536 98 °F (36.7 °C) 62 16 139/79 -- 93 % None (Room air) Lying   11/18/23 15:35:17 -- 62 16 175/76 Abnormal  -- 94 % None (Room air) --   11/18/23 15:18:55 98 °F (36.7 °C) 62 16 135/59 -- 97 % None (Room air) Lying     Pertinent Labs/Diagnostic Test Results:   11/18 Echo=  Results pending    MRI brain wo contrast   Final Result (11/19 1603)      Acute lacunar infarct in the right centrum semiovale. XR chest 1 view portable   Final Result (11/19 1510)      Small right effusion and mild right base atelectasis. CTA stroke alert (head/neck)   Final Result  (11/18 1547)      No hemodynamically significant stenosis, dissection or occlusion of the carotid or vertebral arteries or major vessels of the [place]      CT stroke alert brain   Final Result  (11/18 1547)      No evidence of acute infarct, intracranial hemorrhage or mass.      11/17 EKG=  Normal sinus rhythm  Left axis deviation  Low voltage QRS    Results from last 7 days   Lab Units 11/18/23  2110   SARS-COV-2  Negative     Results from last 7 days   Lab Units 11/19/23  0844 11/18/23  1520   WBC Thousand/uL 7.11 6.88   HEMOGLOBIN g/dL 12.1 12.3   HEMATOCRIT % 38.2 38.7   PLATELETS Thousands/uL 166 151   NEUTROS ABS Thousands/µL 4.87  --        Results from last 7 days   Lab Units 11/20/23  0513 11/19/23  0844 11/18/23  1520   SODIUM mmol/L 136 138 138   POTASSIUM mmol/L 4.3 3.9 4.3   CHLORIDE mmol/L 94* 93* 96   CO2 mmol/L 31 33* 34*   ANION GAP mmol/L 11 12 8   BUN mg/dL 52* 58* 57*   CREATININE mg/dL 1.61* 1.52* 1.41*   EGFR ml/min/1.73sq m 28 30 33   CALCIUM mg/dL 8.6 9.1 9.3   MAGNESIUM mg/dL 2.4  --   --      Results from last 7 days   Lab Units 11/19/23  0844   AST U/L 20   ALT U/L 17   ALK PHOS U/L 64   TOTAL PROTEIN g/dL 7.2   ALBUMIN g/dL 3.7   TOTAL BILIRUBIN mg/dL 0.38     Results from last 7 days   Lab Units 11/20/23  0608 11/20/23  0201 11/18/23  1533 11/18/23  1517   POC GLUCOSE mg/dl 165* 200* 129 130     Results from last 7 days   Lab Units 11/20/23  0513 11/19/23  0844 11/18/23  1520   GLUCOSE RANDOM mg/dL 105 199* 129       Results from last 7 days   Lab Units 11/19/23  0844   HEMOGLOBIN A1C % 7.0*   EAG mg/dl 154     Results from last 7 days   Lab Units 11/18/23  2353 11/18/23  1719 11/18/23  1520   HS TNI 0HR ng/L  --   --  35   HS TNI 2HR ng/L  --  33  --    HSTNI D2 ng/L  --  -2  --    HS TNI 4HR ng/L 35  --   --    HSTNI D4 ng/L 0  --   --      Results from last 7 days   Lab Units 11/18/23  1611   PROTIME seconds 12.4   INR  0.94   PTT seconds 26     Results from last 7 days   Lab Units 11/18/23  1520   PROCALCITONIN ng/ml 0.10     Results from last 7 days   Lab Units 11/18/23  1612   CLARITY UA  Turbid   COLOR UA  Light Yellow   SPEC GRAV UA  1.009   PH UA  5.5   GLUCOSE UA mg/dl Negative   KETONES UA mg/dl Negative   BLOOD UA  Small*   PROTEIN UA mg/dl Negative   NITRITE UA  Negative   BILIRUBIN UA  Negative   UROBILINOGEN UA (BE) mg/dl <2.0   LEUKOCYTES UA  Large*   WBC UA /hpf Innumerable*   RBC UA /hpf 4-10*   BACTERIA UA /hpf Moderate*   EPITHELIAL CELLS WET PREP /hpf Occasional     Results from last 7 days   Lab Units 11/18/23  2110   INFLUENZA A PCR  Negative   INFLUENZA B PCR  Negative   RSV PCR  Negative     Results from last 7 days   Lab Units 11/18/23  1805   BLOOD CULTURE  No Growth at 24 hrs. No Growth at 24 hrs.        ED Treatment:   Medication Administration from 11/18/2023 1503 to 11/18/2023 2036         Date/Time Order Dose Route Action     11/18/2023 1522 EST iohexol (OMNIPAQUE) 350 MG/ML injection (MULTI-DOSE) 85 mL 85 mL Intravenous Given     11/18/2023 1645 EST ceftriaxone (ROCEPHIN) 1 g/50 mL in dextrose IVPB 1,000 mg Intravenous New Bag     11/18/2023 1647 EST aspirin chewable tablet 324 mg 324 mg Oral Given          Past Medical History:   Diagnosis Date    Arthritis     Breast cancer ([address] [facility]) 09/08/2015    BILATERAL    Cardiac disease     aortic valve transplant    CHF (congestive heart failure) ([facility])     Chronic kidney disease     Compression fracture of body of thoracic vertebra ([facility])     CVA (cerebral vascular accident) ([address] [facility])     Diabetes mellitus ([address] [facility])     Disease of thyroid gland     Fibromyalgia, primary     Gout     H/O cervical fracture 01/09/2019    Hyperlipidemia     Hypertension     Neuropathy     AZUL (obstructive sleep apnea) 10/19/2019    Pressure injury of skin     Renal disorder     kidney stent    Stroke [facility])     UTI (urinary tract infection)     colinized bacteria.      Present on Admission:   Stroke-like symptoms   Type 2 diabetes mellitus ([facility])   SLE (systemic lupus erythematosus) ([facility])   Stage 4 chronic kidney disease ([facility])   Hypothyroidism   Hyperlipidemia   Rheumatoid arthritis ([facility])   CHF (congestive heart failure) ([facility])      Admitting Diagnosis: UTI (urinary tract infection) [N39.0]  Stroke ([address] [facility]) [I63.9]  Stroke (cerebrum) ([facility]) [I63.9]  Age/Sex: 80 y.o. female  Admission Orders:  Cont pulse ox  Consult neuro  Contact & airborne isolation  O2 to keep sats>94%  Scd/foot pumps  Neuro checks/VS:Every 1 hour x 4 hours, then every 2 hours x 8 hours, then every 4 hours x 72 hours   Nursing dysphagia screen  Pt/ot/st eval & tx    Scheduled Medications:  allopurinol, 150 mg, Oral, Daily  aspirin, 81 mg, Oral, Daily  cefTRIAXone, 1,000 mg, Intravenous, Q24H  clopidogrel, 75 mg, Oral, Daily  colchicine, 0.6 mg, Oral, Every Other Day  gabapentin, 300 mg, Oral, BID  heparin (porcine), 5,000 Units, Subcutaneous, Q8H [facility]  isosorbide mononitrate, 30 mg, Oral, Daily  levothyroxine, 100 mcg, Oral, Early Morning  metoprolol tartrate, 25 mg, Oral, Q12H [facility]  polyethylene glycol, 17 g, Oral, Daily  predniSONE, 5 mg, Oral, Daily  senna, 8.6 mg, Oral, Daily  sertraline, 25 mg, Oral, Daily  torsemide, 20 mg, Oral, BID    PRN Meds:  albuterol, 2 puff, Inhalation, Q4H PRN  calcium carbonate, 1,000 mg, Oral, Daily PRN  methocarbamol, 750 mg, Oral, Q6H PRN  ondansetron, 4 mg, Intravenous, Q6H PRN    Network Utilization Review Department  ATTENTION: Please call with any questions or concerns to [phone] and carefully listen to the prompts so that you are directed to the right person. All voicemails are confidential.   For Discharge needs, contact Care Management DC Support Team at [phone] opt. [phone]  Send all requests for admission clinical reviews, approved or denied determinations and any other requests to dedicated fax number below belonging to the campus where the patient is receiving treatment.  List of dedicated fax numbers for the Facilities:  [place] DENIALS (Administrative/Medical Necessity) [phone]   DISCHARGE SUPPORT TEAM (NETWORK) [id number] Abimael Romero (Maternity/NICU/Pediatrics) [phone]   [address] [place] [address] Drive [address] [facility] [address] [facility] [phone]   [address] [facility] [address] [facility] [address] [facility] [address] [address] [facility] [address] [facility] [address] 1300 [facility] [address] [place] [address] Nn [phone]

## 2023-11-19 NOTE — ASSESSMENT & PLAN NOTE
MRI pending  Patient started on Rocephin empirically for possible urinary tract infection. Follow-up on final cultures  Continue with antibiotic therapy  Continue with stroke pathway work-up as above.   PT/OT

## 2023-11-19 NOTE — ASSESSMENT & PLAN NOTE
Lab Results   Component Value Date    EGFR 30 11/19/2023    EGFR 33 11/18/2023    EGFR 29 09/07/2023    CREATININE 1.52 (H) 11/19/2023    CREATININE 1.41 (H) 11/18/2023    CREATININE 1.55 (H) 09/07/2023   Monitor renal function

## 2023-11-19 NOTE — PLAN OF CARE
Problem: NEUROSENSORY - ADULT  Goal: Achieves stable or improved neurological status  Description: INTERVENTIONS  - Monitor and report changes in neurological status  - Monitor vital signs such as temperature, blood pressure, glucose, and any other labs ordered   - Initiate measures to prevent increased intracranial pressure  - Monitor for seizure activity and implement precautions if appropriate      Outcome: Progressing  Goal: Remains free of injury related to seizures activity  Description: INTERVENTIONS  - Maintain airway, patient safety  and administer oxygen as ordered  - Monitor patient for seizure activity, document and report duration and description of seizure to physician/advanced practitioner  - If seizure occurs,  ensure patient safety during seizure  - Reorient patient post seizure  - Seizure pads on all 4 side rails  - Instruct patient/family to notify RN of any seizure activity including if an aura is experienced  - Instruct patient/family to call for assistance with activity based on nursing assessment  - Administer anti-seizure medications if ordered    Outcome: Progressing  Goal: Achieves maximal functionality and self care  Description: INTERVENTIONS  - Monitor swallowing and airway patency with patient fatigue and changes in neurological status  - Encourage and assist patient to increase activity and self care.    - Encourage visually impaired, hearing impaired and aphasic patients to use assistive/communication devices  Outcome: Progressing        Problem: MUSCULOSKELETAL - ADULT  Goal: Maintain or return mobility to safest level of function  Description: INTERVENTIONS:  - Assess patient's ability to carry out ADLs; assess patient's baseline for ADL function and identify physical deficits which impact ability to perform ADLs (bathing, care of mouth/teeth, toileting, grooming, dressing, etc.)  - Assess/evaluate cause of self-care deficits   - Assess range of motion  - Assess patient's mobility  - Assess patient's need for assistive devices and provide as appropriate  - Encourage maximum independence but intervene and supervise when necessary  - Involve family in performance of ADLs  - Assess for home care needs following discharge   - Consider OT consult to assist with ADL evaluation and planning for discharge  - Provide patient education as appropriate  Outcome: Progressing

## 2023-11-19 NOTE — PROGRESS NOTES
[address]  Progress Note  Name: Sofía Llanes  MRN: [id number]  Unit/Bed#: [facility] 710-01 I Date of Admission: 11/18/2023   Date of Service: 11/19/2023 I Hospital Day: 1    Assessment/Plan   Goals of care, counseling/discussion  Assessment & Plan  Of note, patient is followed by [facility] palliative care. Note patient's outpatient notes. Confirmed with granddaughter that she is a level 3 DNR. Kerrie Hill She has been followed by [facility] due to history of heart failure. We will continue with aggressive medical management and work-up for stroke. Family is agreeable to stroke pathway work-up and recommendations. Patient presents with left-sided weakness which is gradually improving on presentation to the ED. Lethargy  Assessment & Plan  MRI pending  Patient started on Rocephin empirically for possible urinary tract infection. Follow-up on final cultures  Continue with antibiotic therapy  Continue with stroke pathway work-up as above.   PT/OT    Hyperlipidemia  Assessment & Plan  Patient reportedly intolerant to statins, but in review of her recent meds she does not appear to have trialed too many low intensity statins, discussed with patient and family at bedside and will try lowest dose pravastatin at 10 mg daily at this time    Type 2 diabetes mellitus [facility])  Assessment & Plan    Lab Results   Component Value Date    HGBA1C 7.0 (H) 11/19/2023   Sliding-scale insulin    Rheumatoid arthritis ([address])  Assessment & Plan  Continue prednisone 5 mg daily    CHF (congestive heart failure) ([address])  Assessment & Plan  Wt Readings from Last 3 Encounters:   11/18/23 73.9 kg (163 lb)   11/19/23 73.9 kg (163 lb)   09/05/23 72.6 kg (160 lb)       Continue with Plavix, Imdur, Lopressor and torsemide 20 twice daily  Patient followed by palliative care as an outpatient with [facility] at [address] chronic kidney disease [facility])  Assessment & Plan  Lab Results   Component Value Date    EGFR 30 11/19/2023    EGFR 33 11/18/2023    EGFR 29 09/07/2023    CREATININE 1.52 (H) 11/19/2023    CREATININE 1.41 (H) 11/18/2023    CREATININE 1.55 (H) 09/07/2023   Monitor renal function    SLE (systemic lupus erythematosus) ([address])  Assessment & Plan  Currently on prednisone. Daughter confirms that she is taking 5 mg daily    Hypothyroidism  Assessment & Plan  Levothyroxine 100 daily. Patient previously was taking 112 which was changed to 100. We will continue with levothyroxine at 100 mcg daily    * Stroke-like symptoms  Assessment & Plan  Patient had prehospital stroke alert initiated. Note neurology input  Medicine admission for stroke pathway  Continue with supportive care. History of hypertension  Aspirin 81 mg daily. Loaded with 325 in ER. Patient on Plavix at home. Allow for permissive hypertension             VTE Pharmacologic Prophylaxis:   Moderate Risk (Score 3-4) - Pharmacological DVT Prophylaxis Ordered: heparin. Mobility:   Basic Mobility Inpatient Raw Score: 7  -[facility] Goal: 2: Bed activities/Dependent transfer  -HLM Achieved: 4: Move to chair/commode  HLM Goal achieved. Continue to encourage appropriate mobility. Patient Centered Rounds: I performed bedside rounds with nursing staff today. Discussions with Specialists or Other Care Team Provider: N/A    Education and Discussions with Family / Patient: Updated  (daughter) at bedside. Total Time Spent on Date of Encounter in care of patient: 45 mins. This time was spent on one or more of the following: performing physical exam; counseling and coordination of care; obtaining or reviewing history; documenting in the medical record; reviewing/ordering tests, medications or procedures; communicating with other healthcare professionals and discussing with patient's family/caregivers.     Current Length of Stay: 1 day(s)  Current Patient Status: Inpatient   Certification Statement: The patient will continue to require additional inpatient hospital stay due to CVA rule out  Discharge Plan: Anticipate discharge in 24-48 hrs to discharge location to be determined pending rehab evaluations. Code Status: Level 3 - DNAR and DNI    Subjective:   Seen and examined at bedside. Patient resting comfortably. She states her left upper extremity has returned to its prehospital normal but she continues to have left lower extremity weakness. No other concerns    Objective:     Vitals:   Temp (24hrs), Av °F (36.7 °C), Min:98 °F (36.7 °C), Max:98 °F (36.7 °C)    Temp:  [98 °F (36.7 °C)] 98 °F (36.7 °C)  HR:  [56-63] 61  Resp:  [16-20] 16  BP: (125-175)/(56-96) 154/56  SpO2:  [92 %-98 %] 92 %  Body mass index is 32.92 kg/m². Input and Output Summary (last 24 hours): Intake/Output Summary (Last 24 hours) at 2023 1434  Last data filed at 2023 0130  Gross per 24 hour   Intake 480 ml   Output --   Net 480 ml       Physical Exam:   Physical Exam  Vitals reviewed. Constitutional:       General: She is not in acute distress. Appearance: She is obese. She is not ill-appearing. HENT:      Head: Normocephalic. Mouth/Throat:      Mouth: Mucous membranes are moist.   Eyes:      General: No scleral icterus. Extraocular Movements: Extraocular movements intact. Cardiovascular:      Rate and Rhythm: Normal rate and regular rhythm. Pulses: Normal pulses. Heart sounds: No murmur heard. No friction rub. No gallop. Pulmonary:      Effort: Pulmonary effort is normal. No respiratory distress. Breath sounds: No wheezing, rhonchi or rales. Abdominal:      General: Abdomen is flat. Bowel sounds are normal. There is no distension. Palpations: Abdomen is soft. Tenderness: There is no abdominal tenderness. There is no guarding or rebound. Musculoskeletal:      Right lower leg: No edema. Left lower leg: No edema. Skin:     General: Skin is warm.       Capillary Refill: Capillary refill takes less than 2 seconds. Coloration: Skin is not jaundiced. Findings: No rash. Neurological:      General: No focal deficit present. Mental Status: She is alert and oriented to person, place, and time. Sensory: No sensory deficit. Motor: No weakness. Psychiatric:         Mood and Affect: Mood normal.         Behavior: Behavior normal.          Additional Data:     Labs:  Results from last 7 days   Lab Units 11/19/23  0844   WBC Thousand/uL 7.11   HEMOGLOBIN g/dL 12.1   HEMATOCRIT % 38.2   PLATELETS Thousands/uL 166   NEUTROS PCT % 68   LYMPHS PCT % 25   MONOS PCT % 5   EOS PCT % 0     Results from last 7 days   Lab Units 11/19/23  0844   SODIUM mmol/L 138   POTASSIUM mmol/L 3.9   CHLORIDE mmol/L 93*   CO2 mmol/L 33*   BUN mg/dL 58*   CREATININE mg/dL 1.52*   ANION GAP mmol/L 12   CALCIUM mg/dL 9.1   ALBUMIN g/dL 3.7   TOTAL BILIRUBIN mg/dL 0.38   ALK PHOS U/L 64   ALT U/L 17   AST U/L 20   GLUCOSE RANDOM mg/dL 199*     Results from last 7 days   Lab Units 11/18/23  1611   INR  0.94     Results from last 7 days   Lab Units 11/18/23  1533 11/18/23  1517   POC GLUCOSE mg/dl 129 130     Results from last 7 days   Lab Units 11/19/23  0844   HEMOGLOBIN A1C % 7.0*     Results from last 7 days   Lab Units 11/18/23  1520   PROCALCITONIN ng/ml 0.10       Lines/Drains:  Invasive Devices       Peripheral Intravenous Line  Duration             Peripheral IV 08/06/21 Right Forearm 835 days    Peripheral IV 11/18/23 Left Antecubital <1 day              Drain  Duration             External Urinary Catheter 548 days                          Imaging: Reviewed radiology reports from this admission including: CT head    Recent Cultures (last 7 days):   Results from last 7 days   Lab Units 11/18/23  1805   BLOOD CULTURE  Received in Microbiology Lab. Culture in Progress. Received in Microbiology Lab. Culture in Progress.        Last 24 Hours Medication List:   Current Facility-Administered Medications   Medication Dose Route Frequency Provider Last Rate    albuterol  2 puff Inhalation Q4H PRN Hetul Shine, DO      allopurinol  150 mg Oral Daily Hetul Shine, DO      aspirin  81 mg Oral Daily Aun Sanket      calcium carbonate  1,000 mg Oral Daily PRN Hetul Shine, DO      cefTRIAXone  1,000 mg Intravenous Q24H Hetul Shine, DO      clopidogrel  75 mg Oral Daily Hetul Shine, DO      colchicine  0.6 mg Oral Every Other Day Hetul Shine, DO      gabapentin  300 mg Oral BID Hetul Shine, DO      heparin (porcine)  5,000 Units Subcutaneous Q8H [facility], DO      HYDROcodone-acetaminophen  1 tablet Oral Q6H PRN Cyrus Morris      isosorbide mononitrate  30 mg Oral Daily [facility]      levothyroxine  100 mcg Oral Early Morning Hetul Shine, DO      methocarbamol  750 mg Oral Q6H PRN [facility], [facility]      metoprolol tartrate  25 mg Oral Q12H [facility], DO      ondansetron  4 mg Intravenous Q6H PRN Hetul Shine, DO      polyethylene glycol  17 g Oral Daily Hetul Shine, DO      pravastatin  10 mg Oral Daily With Dinner Cyrus Morris      predniSONE  5 mg Oral Daily [facility], [facility]      senna  8.6 mg Oral Daily [facility], DO      sertraline  25 mg Oral Daily Hetul Shine, DO      torsemide  20 mg Oral BID Hetul Shine, DO          Today, Patient Was Seen By: Cyrus Morris    **Please Note: This note may have been constructed using a voice recognition system. **

## 2023-11-19 NOTE — SPEECH THERAPY NOTE
Speech Language/Pathology  Speech-Language Pathology Bedside Swallow Evaluation      Patient Name: Marcel Amanda    Today's Date: 11/19/2023     Problem List  Principal Problem:    Stroke-like symptoms  Active Problems:    Hypothyroidism    SLE (systemic lupus erythematosus) ([facility])    Stage 4 chronic kidney disease (HCC)    CHF (congestive heart failure) (HCC)    Rheumatoid arthritis (HCC)    Type 2 diabetes mellitus ([address])    Hyperlipidemia    Lethargy    Goals of care, counseling/discussion      Past Medical History  Past Medical History:   Diagnosis Date    Arthritis     Breast cancer ([address]) 09/08/2015    BILATERAL    Cardiac disease     aortic valve transplant    CHF (congestive heart failure) ([facility])     Chronic kidney disease     Compression fracture of body of thoracic vertebra (HCC)     CVA (cerebral vascular accident) ([address])     Diabetes mellitus ([address])     Disease of thyroid gland     Fibromyalgia, primary     Gout     H/O cervical fracture 01/09/2019    Hyperlipidemia     Hypertension     Neuropathy     AZUL (obstructive sleep apnea) 10/19/2019    Pressure injury of skin     Renal disorder     kidney stent    Stroke [facility])     UTI (urinary tract infection)     colinized bacteria.        Past Surgical History  Past Surgical History:   Procedure Laterality Date    AORTIC VALVE SURGERY      BREAST LUMPECTOMY Right 09/08/2015    BREAST LUMPECTOMY Left 09/08/2015    BREAST SURGERY      lumpectomy for breast cancer    BREAST SURGERY      CARDIAC VALVE REPLACEMENT      CATARACT EXTRACTION      CERVICAL SPINE SURGERY      CHOLECYSTECTOMY      FACIAL/NECK BIOPSY Right 9/24/2020    Procedure: EXCISION CHEEK LESION X2 WITH FROZEN SECTION;  Surgeon: Forest Nelson DO;  Location: [address] [place] [address];  Service: [address] [place] [address]    IR THORACENTESIS  2/28/2020    JOINT REPLACEMENT      KIDNEY SURGERY      stent placed for kidney    KNEE SURGERY      OTHER SURGICAL HISTORY      abdominal aneurysm surgery    NM ARTHRODESIS POSTERIOR ATLAS-AXIS C1-C2 N/A 5/1/2019    Procedure: C1-C2 lateral mass fixation fusion with possible sublaminar cables;  Surgeon: Steph Begum MD;  Location: [facility];  Service: Neurosurgery    REPLACEMENT TOTAL KNEE      left        Summary   The patient presents with a functional oropharyngeal swallow. No overt s/s aspiration or penetration with noon meal.  Pt has a latent dry cough intermittently. Cannot r/o reflux. Pt reports symptoms that may be characteristic. She states she sucks on "ricola" cough drops to help with it and the post nasal drip she occasionally experiences. We know the patient from 9/2020 after choking on a sweet potato alvares, she denies any choking on fries or other foods since then. Family confirms. Will sign off. Reconsult as needed. Consider GI consult as outpatient if pt wants to further work up reflux symptoms. Recommended Diet: regular diet and thin liquids  Recommended Form of Meds: whole with liquid   Aspiration precautions and swallowing strategies: upright posture  Other Recommendations: Tray set up for pt. She also has a tremulous right hand during self drinking, lids and straws may assist.     Current Medical Status  Pt is a 80 y.o. female who presented to [address] [place] [address] with LLE weakness and stroke alert. The patient states that her legs still feel weak. No changes in speech or cognition reported but daughter states that the pt has had changes over the last 6 months. Per charting she is followed by [facility] palliative. Current Precautions:  Fall     Allergies:  No known food allergies    Past medical history:  Please see H&P for details    Special Studies:  CT Head 11/18 IMPRESSION: No hemodynamically significant stenosis, dissection or occlusion of the carotid or vertebral arteries or major vessels of the [place]    VBS/MBS 09/30/20: The patient presents with adequate oral and pharyngeal stages of swallowing.  Bite strength, mastication and transfer is adequate, with good bolus control and cohesion with all. Double swallow observed x1 with banana to clear BOT residue. Swallow is timely with no valleculae or pyriform retention. The patient does not have any penetration or aspiration events. Brief scan of the esophagus appears WFL. Recommendations:Diet: Regular. Liquids: Thin Meds: Whole with water. Strategies: Alternate liquids and solids to help with more dry foods Upright position F/u ST tx: Yes to follow up across a meal    Social/Education/Vocational Hx:  Unknown. Family assists. Swallow Information   Current Risks for Dysphagia & Aspiration: Previous choking incident  Current Symptoms/Concerns: None but came to hospital for stroke alert. Current Diet: regular diet and thin liquids   Baseline Diet: Regular and thins      Baseline Assessment   Behavior/Cognition: alert  Speech/Language Status: able to participate in conversation. Daughter reports noting a decline in cognition over the last 6 months. Patient Positioning: upright in bed  Pain Status/Interventions/Response to Interventions: Denies pain       Swallow Mechanism Exam  Facial: symmetrical  Labial: WFL  Lingual: WFL  Velum: symmetrical  Mandible: adequate ROM  Dentition: full dentures  Vocal quality:clear/adequate   Volitional Cough: strong/productive   Respiratory Status: on RA     Consistencies Assessed and Performance   Consistencies Administered: The patient was seen at her noon meal.  She ate 1/2 of a fish sandwich, bites of mashed potatoes, bites of carrots, and 100% of her carrot cake. She reports that this hospital has the best carrot cake. 1oz thin water and 2oz coffee consumed. The patient does have spilling of coffee as it is an open container and she has a tremulous right hand. Her daughter states this is not new and they have shirt covers at home. Oral Stage: WFL  Mastication was adequate with the materials administered today.   Bolus formation and transfer were functional with no significant oral residue noted. No overt s/s reduced oral control. Pharyngeal Stage: WFL  Swallow Mechanics:  Swallowing initiation appeared prompt. Laryngeal rise was palpated and judged to be within functional limits. No coughing, throat clearing, change in vocal quality or respiratory status immediately after trials. Pt has a latent dry cough that was noted x3 independent of PO and at least 2-3 minutes following. Cannot r/o reflux. Esophageal Concerns: Per daughters report, the patient does present with a latent coughing at times but after a "bubbling" sound which has not been choking but from the esophageal region. The patient indicates she does experience belching at times. Summary and Recommendations (see above)    Results Reviewed with: patient, RN, and family     Treatment Recommended: No. Will sign off, reconsult as needed.          Speech Therapy Prognosis   Prognosis: good    Prognosis Considerations: medical status

## 2023-11-19 NOTE — PHYSICAL THERAPY NOTE
Physical Therapy Evaluation     Patient's Name: Mario Montelongo    Admitting Diagnosis  UTI (urinary tract infection) [N39.0]  Stroke ([address]) [I63.9]  Stroke (cerebrum) ([address]) [I63.9]    Problem List  Patient Active Problem List   Diagnosis    Hypothyroidism    H/O: CVA (cerebrovascular accident)    Essential hypertension    Chronic combined systolic and diastolic heart failure (HCC)    Depression    History of aortic valve replacement    Hyponatremia    Chronic anemia    SDH (subdural hematoma) ([facility])    AZUL (obstructive sleep apnea)    Hyperlipidemia    Chronic respiratory failure with hypoxia ([address])    Controlled type 2 diabetes mellitus with diabetic neuropathy, without long-term current use of insulin ([facility])    RA (rheumatoid arthritis) ([address])    SLE (systemic lupus erythematosus) ([address])    H/O spinal fusion    Renal artery stenosis ([facility])    History of malignant neoplasm of breast    Obesity (BMI 30.0-34.9)    Osteoporosis    Peripheral polyneuropathy    Vitamin D deficiency    Stage 4 chronic kidney disease ([address])    Alkalosis    Wheezing    Encounter for follow-up examination after completed treatment for malignant neoplasm    Nephrosclerosis arteriolar, stage 1-4 or unspecified chronic kidney disease    Seasonal allergic rhinitis due to pollen    Toxic metabolic encephalopathy    Aspiration pneumonia due to regurgitated food ([address])    Macrocytic anemia    Chronic gout without tophus    HTN (hypertension), benign    Recurrent UTI    Multiple drug resistant organism (MDRO) culture positive    COVID-19    Fall    Chronic midline low back pain without sciatica    Ambulatory dysfunction    Fall    Hypothyroidism    DM type 2 (diabetes mellitus, type 2) (HCC)    AZUL (obstructive sleep apnea)    Essential hypertension    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 [facility])    Renal artery stenosis ([facility])    Lab test positive for detection of COVID-19 virus    Fall    Cervical spine fracture ([facility])    Contusion    CHF (congestive heart failure) (HCC)    CKD (chronic kidney disease) stage 4, GFR 15-29 ml/min (HCC)    Hypothyroidism    Thoracic spine fracture ([facility])    Perineal hematoma    Type 2 diabetes mellitus with diabetic neuropathy, without long-term current use of insulin (HCC)    Primary hypertension    Class 1 obesity due to excess calories without serious comorbidity with body mass index (BMI) of 33.0 to 33.9 in adult    Hemorrhoids    Rheumatoid arthritis ([address])    Type 2 diabetes mellitus ([address])    History of renal artery stenosis    Family history of gout    Peripheral polyneuropathy    Hypertension    Lupus ([address])    Hyperlipidemia    History of stroke    Fall    Pressure injury of deep tissue    Pancreatic lesion    Stroke-like symptoms    Lethargy    Goals of care, counseling/discussion       Past Medical History  Past Medical History:   Diagnosis Date    Arthritis     Breast cancer ([address]) 09/08/2015    BILATERAL    Cardiac disease     aortic valve transplant    CHF (congestive heart failure) ([facility])     Chronic kidney disease     Compression fracture of body of thoracic vertebra ([facility])     CVA (cerebral vascular accident) ([address])     Diabetes mellitus ([address])     Disease of thyroid gland     Fibromyalgia, primary     Gout     H/O cervical fracture 01/09/2019    Hyperlipidemia     Hypertension     Neuropathy     AZUL (obstructive sleep apnea) 10/19/2019    Pressure injury of skin     Renal disorder     kidney stent    Stroke [facility])     UTI (urinary tract infection)     colinized bacteria.        Past Surgical History  Past Surgical History:   Procedure Laterality Date    AORTIC VALVE SURGERY      BREAST LUMPECTOMY Right 09/08/2015    BREAST LUMPECTOMY Left 09/08/2015    BREAST SURGERY      lumpectomy for breast cancer    BREAST SURGERY      CARDIAC VALVE REPLACEMENT      CATARACT EXTRACTION      CERVICAL SPINE SURGERY      CHOLECYSTECTOMY      FACIAL/NECK BIOPSY Right 9/24/2020    Procedure: EXCISION CHEEK LESION X2 WITH FROZEN SECTION; Surgeon: Jania Alonzo DO;  Location[facility] MAIN OR;  Service: [facility]    IR THORACENTESIS  2/28/2020    JOINT REPLACEMENT      KIDNEY SURGERY      stent placed for kidney    KNEE SURGERY      OTHER SURGICAL HISTORY      abdominal aneurysm surgery    MD ARTHRODESIS POSTERIOR ATLAS-AXIS C1-C2 N/A 5/1/2019    Procedure: C1-C2 lateral mass fixation fusion with possible sublaminar cables;  Surgeon: Stephen Peterson MD;  Location: [facility] MAIN OR;  Service: Neurosurgery    REPLACEMENT TOTAL KNEE      left           11/19/23 0940   PT Last Visit   PT Visit Date 11/19/23   Note Type   Note type Evaluation   Pain Assessment   Pain Assessment Tool 0-10   Pain Score No Pain   Restrictions/Precautions   Weight Bearing Precautions Per Order No   Other Precautions Cognitive; Chair Alarm; Bed Alarm;Multiple lines; Fall Risk   Home Living   Type of Home Apartment  (in-law suite attached to daughter's home)   Home Layout One level;Performs ADLs on one level; Able to live on main level with bedroom/bathroom; Ramped entrance   Etubics Grab bars in shower; Shower chair;Grab bars around toilet   Home Equipment Walker;Cane  (+ STS recliner; was using RW PT)   Prior Function   Level of [place] Independent with ADLs; Independent with functional mobility; Needs assistance with IADLS   Lives With Daughter   Receives Help From Family   IADLs Family/Friend/Other provides transportation; Family/Friend/Other provides meals; Family/Friend/Other provides medication management   Falls in the last 6 months 1 to 4   Vocational Retired   General   Family/Caregiver Present No   Cognition   Arousal/Participation Cooperative   Orientation Level Oriented X4   Following Commands Follows one step commands without difficulty   Comments pleasant and cooperative   RLE Assessment   RLE Assessment   (functionally 3/5)   LLE Assessment   LLE Assessment   (functionally 3-/5) Bed Mobility   Rolling R 3  Moderate assistance   Additional items Assist x 1; Increased time required;Verbal cues   Rolling L 4  Minimal assistance   Additional items Assist x 1; Increased time required;Verbal cues   Supine to Sit Unable to assess   Sit to Supine 2  Maximal assistance   Additional items Assist x 2; Increased time required;Verbal cues;LE management   Additional Comments pt sitting OOB in recliner upon arrival- found to be incontinent of bowel. pt left supine in bed with all needs wihtin reach- increased time required for hygiene tasks. Transfers   Sit to Stand 2  Maximal assistance   Additional items Assist x 2; Increased time required;Verbal cues   Stand to Sit 2  Maximal assistance   Additional items Assist x 2; Increased time required;Verbal cues   Stand pivot 2  Maximal assistance   Additional items Assist x 2; Increased time required;Verbal cues   Additional Comments transfers with b/l HHA; decreased sequencing noted to LLE- unable to advance despite max verbal/ tactile cues    Ambulation/Elevation   Gait pattern Not appropriate  (2* poor standing tolerance)   Balance   Static Sitting Fair -   Dynamic Sitting Poor +   Static Standing Poor   Dynamic Standing Poor -   Ambulatory Zero   Endurance Deficit   Endurance Deficit Yes   Activity Tolerance   Activity Tolerance Patient limited by fatigue   Medical Staff Made Aware ADILSON Randle; co-session completed this date 2* increased medical complexity and multiple co-morbidities   Nurse Made Aware RN cleared pt to participate in therapy session   Assessment   Prognosis Fair   Problem List Decreased strength;Decreased range of motion;Decreased endurance; Impaired balance;Decreased mobility; Decreased coordination;Decreased cognition; Impaired judgement;Decreased safety awareness; Obesity   Assessment Pt is a 80 y.o. female seen for PT evaluation s/p admit to [address] [place] [address] on 11/18/2023. Pt was admitted with a primary dx of: stroke-like symptoms.   PT now consulted for assessment of mobility and d/c needs. Pt with Up and OOB as tolerated  orders. Pts current comorbidities effecting treatment include: hypothyroidism, SLE, CKD, CHF, RA, type 2 DM, hyperlipidemia, lethargy. Pt  has a past medical history of Arthritis, Breast cancer ([address]) (09/08/2015), Cardiac disease, CHF (congestive heart failure) ([address]), Chronic kidney disease, Compression fracture of body of thoracic vertebra ([address]), CVA (cerebral vascular accident) ([address]), Diabetes mellitus ([address]), Disease of thyroid gland, Fibromyalgia, primary, Gout, H/O cervical fracture (01/09/2019), Hyperlipidemia, Hypertension, Neuropathy, AZUL (obstructive sleep apnea) (10/19/2019), Pressure injury of skin, Renal disorder, Stroke ([address]), and UTI (urinary tract infection). Pts current clinical presentation is Unstable/ Unpredictable (high complexity) due to Ongoing medical management for primary dx, Increased reliance on more restrictive AD compared to baseline, Decreased activity tolerance compared to baseline, Fall risk, Increased assistance needed from caregiver at current time, Cog status, Trending lab values, Continuous pulse oximetry monitoring . Prior to admission, pt was residing with daughter in 1 level home with ramped entrance and was independent using RW. Upon evaluation, pt currently is requiring max Ax2 for bed mobility; max Ax2 for transfers. Pt presents at PT eval functioning below baseline and currently w/ overall mobility deficits 2* to: BLE weakness, decreased ROM, impaired balance, decreased endurance, gait deviations, decreased activity tolerance compared to baseline, decreased functional mobility tolerance compared to baseline, decreased safety awareness, fall risk. Pt currently at a fall risk 2* to impairments listed above. Pt will continue to benefit from skilled acute PT interventions to address stated impairments; to maximize functional mobility; for ongoing pt/ family training; and DME needs.  At conclusion of PT session pt returned BTB and bed alarm engaged with phone and call bell within reach. Pt denies any further questions at this time. The patient's AM-[facility] Basic Mobility Inpatient Short Form Raw Score is 7. A Raw score of less than or equal to 16 suggests the patient may benefit from discharge to post-acute rehabilitation services. Please also refer to the recommendation of the Physical Therapist for safe discharge planning. Recommend STR upon hospital D/C. Barriers to Discharge Inaccessible home environment;Decreased caregiver support   Goals   Patient Goals to get cleaned up   STG Expiration Date 12/03/23   Short Term Goal #1 STG 1. Pt will be able to perform bed mobility tasks with S level in order to improve overall functional mobility and assist in safe d/c. STG 2. Pt with sit EOB for at least 25 minutes at S level in order to strengthen abdominal musculature and assist in future transfers/ ambulation. STG 3. Pt will be able to perform functional transfer with min A in order to improve overall functional mobility and assist in safe d/c. STG 4. Pt will be able to ambulate at least 25 feet with least restrictive device with min A in order to improve overall functional mobility and assist in safe d/c. STG 5. Pt will improve sitting/standing static/dynamic balance 1/2 grade in order to improve functional mobility and assist in safe d/c. STG 6. Pt will improve LE strength by 1/2 grade in order to improve functional mobility and assist in safe d/c.   PT Treatment Day 0   Plan   Treatment/Interventions Functional transfer training;LE strengthening/ROM; Therapeutic exercise; Endurance training;Patient/family training;Equipment eval/education; Bed mobility;Gait training;Spoke to nursing;Spoke to case management;OT   PT Frequency 3-5x/wk   Discharge Recommendation   Rehab Resource Intensity Level, PT II (Moderate Resource Intensity)   AM-PAC Basic Mobility Inpatient   Turning in Flat Bed Without Bedrails 2 Lying on Back to Sitting on Edge of Flat Bed Without Bedrails 1   Moving Bed to Chair 1   Standing Up From Chair Using Arms 1   Walk in Room 1   Climb 3-5 Stairs With Railing 1   Basic Mobility Inpatient Raw Score 7   Highest Level Of Mobility   -[facility] Goal 2: Bed activities/Dependent transfer   -HL Achieved 4: Move to chair/commode   Modified [place] Scale   Modified Rosy Scale 4   Barthel Index   Feeding 10   Bathing 0   Grooming Score 0   Dressing Score 5   Bladder Score 5   Bowels Score 5   Toilet Use Score 5   Transfers (Bed/Chair) Score 5   Mobility (Level Surface) Score 0   Stairs Score 0   Barthel Index Score 35         Rafal Elmore, PT DPT

## 2023-11-19 NOTE — ASSESSMENT & PLAN NOTE
Patient reportedly intolerant to statins, but in review of her recent meds she does not appear to have trialed too many low intensity statins, discussed with patient and family at bedside and will try lowest dose pravastatin at 10 mg daily at this time

## 2023-11-19 NOTE — PLAN OF CARE
Problem: OCCUPATIONAL THERAPY ADULT  Goal: Performs self-care activities at highest level of function for planned discharge setting. See evaluation for individualized goals. Description: Treatment Interventions: ADL retraining, Functional transfer training, UE strengthening/ROM, Endurance training, Patient/family training, Equipment evaluation/education, Compensatory technique education, Continued evaluation, Energy conservation, Activityengagement          See flowsheet documentation for full assessment, interventions and recommendations. Note: Limitation: Decreased ADL status, Decreased UE ROM, Decreased UE strength, Decreased endurance, Decreased self-care trans, Decreased high-level ADLs     Assessment: Pt is a 80 y.o. female seen for OT evaluation s/p admission to [address] [place] [address] on 11/18/2023 due to L-sided weakness. Pt diagnosed with Stroke-like symptoms. Pt has a significant PMH impacting occupational performance including: Arthritis, Breast cancer ([address]), Cardiac disease, CHF (congestive heart failure) ([address]), Chronic kidney disease, Compression fracture of body of thoracic vertebra ([address]), CVA (cerebral vascular accident) ([address]), Diabetes mellitus ([address]), Disease of thyroid gland, Fibromyalgia, primary, Gout, H/O cervical fracture, Hyperlipidemia, Hypertension, Neuropathy, AZUL (obstructive sleep apnea), Pressure injury of skin, Renal disorder, Stroke ([address]), and UTI (urinary tract infection). Pt with active OT evaluation and treatment orders and activity orders. PTA, pt living in an in-law suite attached to her daughter's house w/ a ramped entrance. Pt reports I w/ ADLs and fxnl mobility using RW and requires assist w/ IADLs at baseline; - driving. Pt agreeable and willing to participate in OT evaluation. During evaluation, pt was I fro eating, S for grooming, min A for grooming, and max A for LB ADLs and toileting. Pt also required max Ax2 for STS transfers.  Performance deficits that affect the pt’s occupational performance during the initial evaluation include decreased ADL status, decreased activity tolerance, decreased endurance, decreased sitting tolerance, decreased sitting balance, decreased standing tolerance, decreased standing balance, decreased transfer skills, and decreased fxnl mobility. Based on pt’s functional performance and deficits the following occupations will be addressed in OT treatments in order to maximize pt’s independence and overall occupational performance: grooming, bathing/showering, toileting and toilet hygiene, dressing, and functional mobility. Goals are listed below. From OT perspective, recommend d/c to [facility] when pt medically stable to d/c from acute care. Will continue to follow.      Rehab Resource Intensity Level, OT: II (Moderate Resource Intensity)

## 2023-11-19 NOTE — RESTORATIVE TECHNICIAN NOTE
Restorative Technician Note      Patient Name: Michelle Roberts     Note Type: Mobility  Patient Position Upon Consult: Supine  Activity Performed: Transferred; Dangled; Stood  Assistive Device: Other (Comment) (Assist x2 to the commode/chair)  Education Provided: Yes  Patient Position at End of Consult: Bedside chair;  All needs within reach; Bed/Chair alarm activated    Louis GHOSH, Restorative Technician, [employer]

## 2023-11-19 NOTE — ASSESSMENT & PLAN NOTE
Wt Readings from Last 3 Encounters:   11/18/23 73.9 kg (163 lb)   11/19/23 73.9 kg (163 lb)   09/05/23 72.6 kg (160 lb)       Continue with Plavix, Imdur, Lopressor and torsemide 20 twice daily  Patient followed by palliative care as an outpatient with [place] [facility] at [facility]

## 2023-11-20 ENCOUNTER — APPOINTMENT (INPATIENT)
Dept: NON INVASIVE DIAGNOSTICS | Facility: HOSPITAL | Age: 88
DRG: 064 | End: 2023-11-20
Payer: COMMERCIAL

## 2023-11-20 PROBLEM — R53.83 LETHARGY: Status: RESOLVED | Noted: 2023-11-18 | Resolved: 2023-11-20

## 2023-11-20 LAB
ANION GAP SERPL CALCULATED.3IONS-SCNC: 11 MMOL/L
APICAL FOUR CHAMBER EJECTION FRACTION: 48 %
AV LVOT MEAN GRADIENT: 2 MMHG
AV LVOT PEAK GRADIENT: 3 MMHG
BUN SERPL-MCNC: 52 MG/DL (ref 5–25)
CALCIUM SERPL-MCNC: 8.6 MG/DL (ref 8.4–10.2)
CHLORIDE SERPL-SCNC: 94 MMOL/L (ref 96–108)
CO2 SERPL-SCNC: 31 MMOL/L (ref 21–32)
CREAT SERPL-MCNC: 1.61 MG/DL (ref 0.6–1.3)
DOP CALC LVOT AREA: 3.14 CM2
DOP CALC LVOT CARDIAC INDEX: 2.62 L/MIN/M2
DOP CALC LVOT CARDIAC OUTPUT: 4.43 L/MIN
DOP CALC LVOT DIAMETER: 2 CM
DOP CALC LVOT PEAK VEL VTI: 20.12 CM
DOP CALC LVOT PEAK VEL: 0.8 M/S
DOP CALC LVOT STROKE INDEX: 39.1 ML/M2
DOP CALC LVOT STROKE VOLUME: 63.18
GFR SERPL CREATININE-BSD FRML MDRD: 28 ML/MIN/1.73SQ M
GLUCOSE SERPL-MCNC: 105 MG/DL (ref 65–140)
GLUCOSE SERPL-MCNC: 136 MG/DL (ref 65–140)
GLUCOSE SERPL-MCNC: 144 MG/DL (ref 65–140)
GLUCOSE SERPL-MCNC: 165 MG/DL (ref 65–140)
GLUCOSE SERPL-MCNC: 200 MG/DL (ref 65–140)
GLUCOSE SERPL-MCNC: 291 MG/DL (ref 65–140)
GLUCOSE SERPL-MCNC: 300 MG/DL (ref 65–140)
MAGNESIUM SERPL-MCNC: 2.4 MG/DL (ref 1.9–2.7)
MV MEAN GRADIENT: 4 MMHG
POTASSIUM SERPL-SCNC: 4.3 MMOL/L (ref 3.5–5.3)
SL CV LV EF: 35
SODIUM SERPL-SCNC: 136 MMOL/L (ref 135–147)

## 2023-11-20 PROCEDURE — 93325 DOPPLER ECHO COLOR FLOW MAPG: CPT | Performed by: INTERNAL MEDICINE

## 2023-11-20 PROCEDURE — 93325 DOPPLER ECHO COLOR FLOW MAPG: CPT

## 2023-11-20 PROCEDURE — 93308 TTE F-UP OR LMTD: CPT | Performed by: INTERNAL MEDICINE

## 2023-11-20 PROCEDURE — 82948 REAGENT STRIP/BLOOD GLUCOSE: CPT

## 2023-11-20 PROCEDURE — 93308 TTE F-UP OR LMTD: CPT

## 2023-11-20 PROCEDURE — 93321 DOPPLER ECHO F-UP/LMTD STD: CPT | Performed by: INTERNAL MEDICINE

## 2023-11-20 PROCEDURE — 99233 SBSQ HOSP IP/OBS HIGH 50: CPT | Performed by: STUDENT IN AN ORGANIZED HEALTH CARE EDUCATION/TRAINING PROGRAM

## 2023-11-20 PROCEDURE — 83735 ASSAY OF MAGNESIUM: CPT | Performed by: STUDENT IN AN ORGANIZED HEALTH CARE EDUCATION/TRAINING PROGRAM

## 2023-11-20 PROCEDURE — 93321 DOPPLER ECHO F-UP/LMTD STD: CPT

## 2023-11-20 PROCEDURE — 80048 BASIC METABOLIC PNL TOTAL CA: CPT | Performed by: STUDENT IN AN ORGANIZED HEALTH CARE EDUCATION/TRAINING PROGRAM

## 2023-11-20 RX ORDER — INSULIN LISPRO 100 [IU]/ML
1-5 INJECTION, SOLUTION INTRAVENOUS; SUBCUTANEOUS
Status: DISCONTINUED | OUTPATIENT
Start: 2023-11-20 | End: 2023-11-28 | Stop reason: HOSPADM

## 2023-11-20 RX ORDER — INSULIN LISPRO 100 [IU]/ML
1-5 INJECTION, SOLUTION INTRAVENOUS; SUBCUTANEOUS
Status: DISCONTINUED | OUTPATIENT
Start: 2023-11-20 | End: 2023-11-26

## 2023-11-20 RX ORDER — INSULIN GLARGINE 100 [IU]/ML
5 INJECTION, SOLUTION SUBCUTANEOUS
Status: DISCONTINUED | OUTPATIENT
Start: 2023-11-20 | End: 2023-11-24

## 2023-11-20 RX ADMIN — METHOCARBAMOL 750 MG: 750 TABLET ORAL at 08:43

## 2023-11-20 RX ADMIN — TORSEMIDE 20 MG: 20 TABLET ORAL at 21:29

## 2023-11-20 RX ADMIN — HEPARIN SODIUM 5000 UNITS: 5000 INJECTION INTRAVENOUS; SUBCUTANEOUS at 12:48

## 2023-11-20 RX ADMIN — GABAPENTIN 300 MG: 300 CAPSULE ORAL at 08:43

## 2023-11-20 RX ADMIN — Medication 2 G: at 21:29

## 2023-11-20 RX ADMIN — SERTRALINE 25 MG: 25 TABLET, FILM COATED ORAL at 08:43

## 2023-11-20 RX ADMIN — METOPROLOL TARTRATE 25 MG: 25 TABLET, FILM COATED ORAL at 21:28

## 2023-11-20 RX ADMIN — METOPROLOL TARTRATE 25 MG: 25 TABLET, FILM COATED ORAL at 08:43

## 2023-11-20 RX ADMIN — INSULIN LISPRO 3 UNITS: 100 INJECTION, SOLUTION INTRAVENOUS; SUBCUTANEOUS at 16:00

## 2023-11-20 RX ADMIN — ASPIRIN 81 MG CHEWABLE TABLET 81 MG: 81 TABLET CHEWABLE at 08:43

## 2023-11-20 RX ADMIN — PERFLUTREN 0.4 ML/MIN: 6.52 INJECTION, SUSPENSION INTRAVENOUS at 15:30

## 2023-11-20 RX ADMIN — INSULIN LISPRO 1 UNITS: 100 INJECTION, SOLUTION INTRAVENOUS; SUBCUTANEOUS at 08:42

## 2023-11-20 RX ADMIN — ISOSORBIDE MONONITRATE 30 MG: 30 TABLET, EXTENDED RELEASE ORAL at 08:43

## 2023-11-20 RX ADMIN — PREDNISONE 5 MG: 5 TABLET ORAL at 08:43

## 2023-11-20 RX ADMIN — PRAVASTATIN SODIUM 10 MG: 10 TABLET ORAL at 15:59

## 2023-11-20 RX ADMIN — TORSEMIDE 20 MG: 20 TABLET ORAL at 08:43

## 2023-11-20 RX ADMIN — ALLOPURINOL 150 MG: 300 TABLET ORAL at 08:43

## 2023-11-20 RX ADMIN — METHOCARBAMOL 750 MG: 750 TABLET ORAL at 22:11

## 2023-11-20 RX ADMIN — CEFTRIAXONE SODIUM 1000 MG: 10 INJECTION, POWDER, FOR SOLUTION INTRAVENOUS at 15:59

## 2023-11-20 RX ADMIN — HEPARIN SODIUM 5000 UNITS: 5000 INJECTION INTRAVENOUS; SUBCUTANEOUS at 06:01

## 2023-11-20 RX ADMIN — HYDROCODONE BITARTRATE AND ACETAMINOPHEN 1 TABLET: 5; 325 TABLET ORAL at 22:11

## 2023-11-20 RX ADMIN — INSULIN LISPRO 1 UNITS: 100 INJECTION, SOLUTION INTRAVENOUS; SUBCUTANEOUS at 02:45

## 2023-11-20 RX ADMIN — COLCHICINE 0.6 MG: 0.6 TABLET ORAL at 08:43

## 2023-11-20 RX ADMIN — GABAPENTIN 300 MG: 300 CAPSULE ORAL at 21:28

## 2023-11-20 RX ADMIN — INSULIN GLARGINE 5 UNITS: 100 INJECTION, SOLUTION SUBCUTANEOUS at 22:11

## 2023-11-20 RX ADMIN — HYDROCODONE BITARTRATE AND ACETAMINOPHEN 1 TABLET: 5; 325 TABLET ORAL at 16:00

## 2023-11-20 RX ADMIN — HEPARIN SODIUM 5000 UNITS: 5000 INJECTION INTRAVENOUS; SUBCUTANEOUS at 21:28

## 2023-11-20 RX ADMIN — LEVOTHYROXINE SODIUM 100 MCG: 100 TABLET ORAL at 06:01

## 2023-11-20 RX ADMIN — CLOPIDOGREL BISULFATE 75 MG: 75 TABLET ORAL at 08:43

## 2023-11-20 NOTE — ASSESSMENT & PLAN NOTE
Patient had prehospital stroke alert initiated.  Admitted under stroke pathway  MRI with "Acute lacunar infarct in the right centrum semiovale."  Patient with improving strength in RUE, but continues to have some weakness  Continue stroke pathway  Appreciate neuro recommendations  Awaiting echocardiogram  PT/ OT

## 2023-11-20 NOTE — ASSESSMENT & PLAN NOTE
Wt Readings from Last 3 Encounters:   11/20/23 73.9 kg (163 lb)   11/19/23 73.9 kg (163 lb)   09/05/23 72.6 kg (160 lb)       Continue with Plavix, Imdur, Lopressor and torsemide 20 twice daily  Patient followed by palliative care as an outpatient with [place] [facility] at [facility]

## 2023-11-20 NOTE — DISCHARGE INSTR - OTHER ORDERS
Skin care plans:  1-Apply hydraguard to sacral/buttocks/labia and B/L heels BID and PRN  2-Elevate heels to offload pressure. 3-Ehob cushion in chair when out of bed. 4-Moisturize skin daily with skin nourishing cream.  5-Turn/reposition q2h or when medically stable for pressure re-distribution on skin.

## 2023-11-20 NOTE — PROGRESS NOTES
[address] [facility] [place]  Progress Note  Name: Gene Leigh  MRN: [id number]  Unit/Bed#: [facility]PHP 710-01 I Date of Admission: 11/18/2023   Date of Service: 11/20/2023 I Hospital Day: 2    Assessment/Plan   Goals of care, counseling/discussion  Assessment & Plan  Of note, patient is followed by [facility] palliative care. Note patient's outpatient notes. Confirmed with granddaughter that she is a level 3 DNR. Gretta Esteban She has been followed by [place] palliative care due to history of heart failure. We will continue with aggressive medical management and work-up for stroke. Family is agreeable to stroke pathway work-up and recommendations. Patient presents with left-sided weakness which is gradually improving on presentation to the ED.     Hyperlipidemia  Assessment & Plan  Patient reportedly intolerant to statins, but in review of her recent meds she does not appear to have trialed too many low intensity statins, discussed with patient and family at bedside and will try lowest dose pravastatin at 10 mg daily at this time    Type 2 diabetes mellitus [facility])  Assessment & Plan    Lab Results   Component Value Date    HGBA1C 7.0 (H) 11/19/2023   Sliding-scale insulin, added lantus 5mg    Rheumatoid arthritis (HCC)  Assessment & Plan  Continue prednisone 5 mg daily    CHF (congestive heart failure) ([address])  Assessment & Plan  Wt Readings from Last 3 Encounters:   11/20/23 73.9 kg (163 lb)   11/19/23 73.9 kg (163 lb)   09/05/23 72.6 kg (160 lb)       Continue with Plavix, Imdur, Lopressor and torsemide 20 twice daily  Patient followed by palliative care as an outpatient with [place] care at [address] chronic kidney disease [facility])  Assessment & Plan  Lab Results   Component Value Date    EGFR 28 11/20/2023    EGFR 30 11/19/2023    EGFR 33 11/18/2023    CREATININE 1.61 (H) 11/20/2023    CREATININE 1.52 (H) 11/19/2023    CREATININE 1.41 (H) 11/18/2023   Monitor renal function    SLE (systemic lupus erythematosus) ([address])  Assessment & Plan  Currently on prednisone. Daughter confirms that she is taking 5 mg daily    Hypothyroidism  Assessment & Plan  Levothyroxine 100 daily. Patient previously was taking 112 which was changed to 100. We will continue with levothyroxine at 100 mcg daily    * Stroke-like symptoms  Assessment & Plan  Patient had prehospital stroke alert initiated. Admitted under stroke pathway  MRI with "Acute lacunar infarct in the right centrum semiovale."  Patient with improving strength in RUE, but continues to have some weakness  Continue stroke pathway  Appreciate neuro recommendations  Awaiting echocardiogram  PT/ OT      Lethargy-resolved as of 11/20/2023  Assessment & Plan  MRI pending  Patient started on Rocephin empirically for possible urinary tract infection. Follow-up on final cultures  Continue with antibiotic therapy  Continue with stroke pathway work-up as above. PT/OT           VTE Pharmacologic Prophylaxis:   Moderate Risk (Score 3-4) - Pharmacological DVT Prophylaxis Ordered: heparin. Mobility:   Basic Mobility Inpatient Raw Score: 7  -[facility] Goal: 2: Bed activities/Dependent transfer  -[facility] Achieved: 1: Laying in bed  [facility] Goal NOT achieved. Continue with multidisciplinary rounding and encourage appropriate mobility to improve upon [facility] goals. Patient Centered Rounds: I performed bedside rounds with nursing staff today. Discussions with Specialists or Other Care Team Provider: Neurology    Education and Discussions with Family / Patient: Updated  (daughter) at bedside. Total Time Spent on Date of Encounter in care of patient: 45 mins.  This time was spent on one or more of the following: performing physical exam; counseling and coordination of care; obtaining or reviewing history; documenting in the medical record; reviewing/ordering tests, medications or procedures; communicating with other healthcare professionals and discussing with patient's family/caregivers. Current Length of Stay: 2 day(s)  Current Patient Status: Inpatient   Certification Statement: The patient will continue to require additional inpatient hospital stay due to CVA  Discharge Plan: Anticipate discharge in 24-48 hrs to discharge location to be determined pending rehab evaluations. Code Status: Level 3 - DNAR and DNI    Subjective:   Seen and examined at bedside. Kim Shilrey feels well today, she continues to have weakness in the left lower extremity but does feel like it is improving somewhat    Objective:     Vitals:   Temp (24hrs), Av.4 °F (36.9 °C), Min:98.1 °F (36.7 °C), Max:99.2 °F (37.3 °C)    Temp:  [98.1 °F (36.7 °C)-99.2 °F (37.3 °C)] 98.3 °F (36.8 °C)  HR:  [64-72] 72  Resp:  [16-18] 18  BP: ()/(53-71) 153/71  SpO2:  [94 %-96 %] 94 %  Body mass index is 32.92 kg/m². Input and Output Summary (last 24 hours): Intake/Output Summary (Last 24 hours) at 2023 1652  Last data filed at 2023 0523  Gross per 24 hour   Intake 300 ml   Output --   Net 300 ml       Physical Exam:   Physical Exam  Vitals reviewed. Constitutional:       General: She is not in acute distress. Appearance: She is obese. She is not ill-appearing. HENT:      Head: Normocephalic. Mouth/Throat:      Mouth: Mucous membranes are moist.   Eyes:      General: No scleral icterus. Extraocular Movements: Extraocular movements intact. Cardiovascular:      Rate and Rhythm: Normal rate and regular rhythm. Pulses: Normal pulses. Heart sounds: No murmur heard. No friction rub. No gallop. Pulmonary:      Effort: Pulmonary effort is normal. No respiratory distress. Breath sounds: No wheezing, rhonchi or rales. Abdominal:      General: Abdomen is flat. Bowel sounds are normal. There is no distension. Palpations: Abdomen is soft. Tenderness: There is no abdominal tenderness. There is no guarding or rebound.    Musculoskeletal:      Right lower leg: No edema. Left lower leg: No edema. Skin:     General: Skin is warm. Capillary Refill: Capillary refill takes less than 2 seconds. Coloration: Skin is not jaundiced. Findings: No rash. Neurological:      General: No focal deficit present. Mental Status: She is alert and oriented to person, place, and time. Sensory: No sensory deficit. Motor: Weakness present.       Comments: LLE 2/5 strength   Psychiatric:         Mood and Affect: Mood normal.         Behavior: Behavior normal.          Additional Data:     Labs:  Results from last 7 days   Lab Units 11/19/23  0844   WBC Thousand/uL 7.11   HEMOGLOBIN g/dL 12.1   HEMATOCRIT % 38.2   PLATELETS Thousands/uL 166   NEUTROS PCT % 68   LYMPHS PCT % 25   MONOS PCT % 5   EOS PCT % 0     Results from last 7 days   Lab Units 11/20/23  0513 11/19/23  0844   SODIUM mmol/L 136 138   POTASSIUM mmol/L 4.3 3.9   CHLORIDE mmol/L 94* 93*   CO2 mmol/L 31 33*   BUN mg/dL 52* 58*   CREATININE mg/dL 1.61* 1.52*   ANION GAP mmol/L 11 12   CALCIUM mg/dL 8.6 9.1   ALBUMIN g/dL  --  3.7   TOTAL BILIRUBIN mg/dL  --  0.38   ALK PHOS U/L  --  64   ALT U/L  --  17   AST U/L  --  20   GLUCOSE RANDOM mg/dL 105 199*     Results from last 7 days   Lab Units 11/18/23  1611   INR  0.94     Results from last 7 days   Lab Units 11/20/23  1624 11/20/23  1555 11/20/23  1050 11/20/23  0608 11/20/23  0201 11/18/23  1533 11/18/23  1517   POC GLUCOSE mg/dl 291* 300* 144* 165* 200* 129 130     Results from last 7 days   Lab Units 11/19/23  0844   HEMOGLOBIN A1C % 7.0*     Results from last 7 days   Lab Units 11/18/23  1520   PROCALCITONIN ng/ml 0.10       Lines/Drains:  Invasive Devices       Peripheral Intravenous Line  Duration             Peripheral IV 08/06/21 Right Forearm 836 days    Peripheral IV 11/18/23 Left Antecubital 2 days              Drain  Duration             External Urinary Catheter 549 days                    Imaging: Reviewed radiology reports from this admission including: MRI brain    Recent Cultures (last 7 days):   Results from last 7 days   Lab Units 11/18/23  1805 11/18/23  1612   BLOOD CULTURE  No Growth at 24 hrs.   No Growth at 24 hrs.  --    URINE CULTURE   --  >100,000 cfu/ml Aerococcus urinae*       Last 24 Hours Medication List:   Current Facility-Administered Medications   Medication Dose Route Frequency Provider Last Rate    albuterol  2 puff Inhalation Q4H PRN Hetul Shine, DO      allopurinol  150 mg Oral Daily Hetul Shine, DO      aspirin  81 mg Oral Daily Aun Sanket      calcium carbonate  1,000 mg Oral Daily PRN Hetul Shine, DO      cefTRIAXone  1,000 mg Intravenous Q24H Hetul Shine, DO 1,000 mg (11/20/23 1559)    clopidogrel  75 mg Oral Daily Hetul Shine, DO      colchicine  0.6 mg Oral Every Other Day Hetul Shine, DO      Diclofenac Sodium  2 g Topical 4x Daily Mar Bon      gabapentin  300 mg Oral BID Hetul Shine, DO      heparin (porcine)  5,000 Units Subcutaneous Q8H [address] Hetul Shine, DO      HYDROcodone-acetaminophen  1 tablet Oral Q6H PRN Shreya Pearl      insulin lispro  1-5 Units Subcutaneous TID AC NATHEN Brown      insulin lispro  1-5 Units Subcutaneous HS NATHEN Landrum      insulin lispro  1-5 Units Subcutaneous [address]200 [address]      isosorbide mononitrate  30 mg Oral Daily Hetul Shine, DO      levothyroxine  100 mcg Oral Early Morning Hetul Shine, DO      methocarbamol  750 mg Oral Q6H PRN Hetul Shine, DO      metoprolol tartrate  25 mg Oral Q12H [address] Hetul Shine, DO      ondansetron  4 mg Intravenous Q6H PRN Hetul Shine, DO      polyethylene glycol  17 g Oral Daily Hetul Shine, DO      pravastatin  10 mg Oral Daily With Dinner Shreya Pearl      predniSONE  5 mg Oral Daily Hetul Shine, DO      senna  8.6 mg Oral Daily Hetul Shine, DO      sertraline  25 mg Oral Daily Hetul Shine, DO      torsemide  20 mg Oral BID Hetul Shine, DO          Today, Patient Was Seen By: Shreya Pearl    **Please Note: This note may have been constructed using a voice recognition system. **

## 2023-11-20 NOTE — PLAN OF CARE
Problem: PAIN - ADULT  Goal: Verbalizes/displays adequate comfort level or baseline comfort level  Description: Interventions:  - Encourage patient to monitor pain and request assistance  - Assess pain using appropriate pain scale  - Administer analgesics based on type and severity of pain and evaluate response  - Implement non-pharmacological measures as appropriate and evaluate response  - Consider cultural and social influences on pain and pain management  - Notify physician/advanced practitioner if interventions unsuccessful or patient reports new pain  Outcome: Progressing     Problem: SAFETY ADULT  Goal: Patient will remain free of falls  Description: INTERVENTIONS:  - Educate patient/family on patient safety including physical limitations  - Instruct patient to call for assistance with activity   - Consult OT/PT to assist with strengthening/mobility   - Keep Call bell within reach  - Keep bed low and locked with side rails adjusted as appropriate  - Keep care items and personal belongings within reach  - Initiate and maintain comfort rounds  - Make Fall Risk Sign visible to staff  - Offer Toileting every 2 Hours, in advance of need  - Initiate/Maintain bed alarm  - Apply yellow socks and bracelet for high fall risk patients  - Consider moving patient to room near nurses station  Outcome: Progressing     Problem: Knowledge Deficit  Goal: Patient/family/caregiver demonstrates understanding of disease process, treatment plan, medications, and discharge instructions  Description: Complete learning assessment and assess knowledge base.   Interventions:  - Provide teaching at level of understanding  - Provide teaching via preferred learning methods  Outcome: Progressing     Problem: NEUROSENSORY - ADULT  Goal: Achieves stable or improved neurological status  Description: INTERVENTIONS  - Monitor and report changes in neurological status  - Monitor vital signs such as temperature, blood pressure, glucose, and any other labs ordered   - Initiate measures to prevent increased intracranial pressure  - Monitor for seizure activity and implement precautions if appropriate      Outcome: Progressing

## 2023-11-20 NOTE — ASSESSMENT & PLAN NOTE
Of note, patient is followed by [facility] palliative care. Note patient's outpatient notes. Confirmed with granddaughter that she is a level 3 DNR. Precious Nuno She has been followed by [facility] palliative care due to history of heart failure. We will continue with aggressive medical management and work-up for stroke. Family is agreeable to stroke pathway work-up and recommendations. Patient presents with left-sided weakness which is gradually improving on presentation to the ED.

## 2023-11-20 NOTE — PROGRESS NOTES
NEUROLOGY RESIDENCY PROGRESS NOTE     Name: Luciana Michel   Age & Sex: 80 y.o. female   MRN: [id number]  Unit/Bed#: [facility] 710-01   Encounter: [id number]    Luciana Michel will need follow up in in 6 weeks with neurovascular and memory . Will defer to outpatient for [facility]. Pending for discharge: Echo, optimize patient's risk factors    ASSESSMENT & PLAN     * Stroke-like symptoms  Assessment & Plan  80year-old presenting as a stroke alert on 11/18/2023 with LKW night prior. Initial presenting deficits were LLE weakness. Initial NIH stroke scale of 3 for disorientation, and LLE drift. BP on presentation was 135/59. CTH without evidence of acute infarction, intracranial hemorrhage, mass. CTA H/N did not show any LVO. Given that patient was outside the window she did not receive TNK. Home AP/AC: Plavix  PMhx: Prior Strokes, DM2, HTN, HLD, PVD, SDH, AZUL,CHF, Neuropathy, CKD, SLE, RA, Chronic UTI, Breast CA  Pertinent scores: NIHSS 3, MRS 4    NC CTH: No evidence of acute infarct, hemorrhage or mass. Chronic lacunar infarct in the right lentiform nuclei  CTA H/N: No hemodynamically significant stenosis, dissection or occlusion of the carotid or vertebral arteries or major vessels of the [place]. 11/19 MRI Brain: Acute lacunar infarct in the right centrum semiovale. 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 Initial Ddx suspect recrudesce in the setting of underlying systemic insult vs toxic metabolic encephalopathy vs ischemic stroke. MRI does indicate an acute infarct so patient will require DAPT for 21 days. Will be important to optimize patient's risk factors.      Plan:  Frequent Neuro checks, obtain state CTH if GCS drops 2pts in 1hr  AC/AP: DAPT - Continue home Plavix 75 mg QD, started ASA 81 mg QD  Statin: intolerant, family has agreed to try low intensity Pravachol 10 mg QD with dinner  Imaging: TTE  DVT PPx: Heparin SQ, SCD's  Maintain glucose <180, SSI for coverage if indicated  PT/OT/ST/PMR  Stroke Education  Rest of care per primary team        SUBJECTIVE     Patient was seen and examined. No acute events overnight. Patient reports she feels fine. Granddaughter at bedside expresses concern about patient's functional decline since fall in September with shuffling gait. Patient endorses falling forward not backward and sleep difficulties at night. She looks at the clock at 1 am, 2 am, and 3:30 am. She endorses multiple daytime naps to compensate. Pertinent Negatives include: headaches, migraine headaches, syncope, seizures, numbness or tingling, involuntary movements, tremor     Review of Systems   Respiratory:  Negative for wheezing. Cardiovascular:  Negative for palpitations. Genitourinary:  Negative for dysuria. Musculoskeletal:  Positive for gait problem. Neurological:  Negative for tremors. Psychiatric/Behavioral:  Negative for hallucinations. OBJECTIVE     Patient ID: Shellie Torres is a 80 y.o. female. Vitals:    23 1544 23 2109 23 0735 23 0737   BP: 143/76 134/60 (!) 78/56 147/53   Pulse: 64 67 65 64   Resp:   16 16   Temp:  99.2 °F (37.3 °C) 98.1 °F (36.7 °C) 98.1 °F (36.7 °C)   TempSrc:       SpO2: 95% 96% 95% 94%   Weight:       Height:          Temperature:   Temp (24hrs), Av.5 °F (36.9 °C), Min:98.1 °F (36.7 °C), Max:99.2 °F (37.3 °C)    Temperature: 98.1 °F (36.7 °C)      Physical Exam  Constitutional:       General: She is not in acute distress. Appearance: She is not ill-appearing. HENT:      Head: Normocephalic and atraumatic. Eyes:      Extraocular Movements: Extraocular movements intact. Pupils: Pupils are equal, round, and reactive to light.    Musculoskeletal:      Right lower leg: Edema present. Left lower leg: Edema present. Comments: Baseline chronic venous stasis bilateral lower extremities   Skin:     Coloration: Skin is not jaundiced or pale. Findings: No bruising. Neurological:      Coordination: Finger-Nose-Finger Test normal.   Psychiatric:         Speech: Speech normal.          Neurologic Exam     Mental Status   Disoriented to person. Disoriented to place. (Patient is able to tell me it is 2023 and upcoming holiday is Thanksgiving. She knows current president is Kerline. Mistakenly states that president just before Boba  was Lisa.)  Speech: speech is normal   Level of consciousness: alert  Knowledge: good. Able to name object. Cranial Nerves     CN III, IV, VI   Pupils are equal, round, and reactive to light. CN V   Facial sensation intact. CN VII   Facial expression full, symmetric. CN VIII   Hearing: impaired    Motor Exam   Overall muscle tone: normal    Strength   Strength 5/5 except as noted. RUE and RLE 5/5  LUE 4/5 proximal muscles, 5/5 distal muscles  LLE 3-4/5 but some limitations on this side from knee pain may contribute    Good fine motor in intrinsic hand muscles     Sensory Exam   Light touch normal.     Gait, Coordination, and Reflexes     Coordination   Finger to nose coordination: normal    Tremor   Resting tremor: absent  Intention tremor: absent  Action tremor: absent           LABORATORY DATA     Labs: I have personally reviewed pertinent reports.     Results from last 7 days   Lab Units 11/19/23  0844 11/18/23  1520   WBC Thousand/uL 7.11 6.88   HEMOGLOBIN g/dL 12.1 12.3   HEMATOCRIT % 38.2 38.7   PLATELETS Thousands/uL 166 151   NEUTROS PCT % 68  --    MONOS PCT % 5  --    EOS PCT % 0  --       Results from last 7 days   Lab Units 11/20/23  0513 11/19/23  0844 11/18/23  1520   SODIUM mmol/L 136 138 138   POTASSIUM mmol/L 4.3 3.9 4.3   CHLORIDE mmol/L 94* 93* 96   CO2 mmol/L 31 33* 34*   BUN mg/dL 52* 58* 57*   CREATININE mg/dL 1.61* 1.52* 1.41*   CALCIUM mg/dL 8.6 9.1 9.3   ALK PHOS U/L  --  64  --    ALT U/L  --  17  --    AST U/L  --  20  --      Results from last 7 days   Lab Units 11/20/23  0513   MAGNESIUM mg/dL 2.4          Results from last 7 days   Lab Units 11/18/23  1611   INR  0.94   PTT seconds 26               IMAGING & DIAGNOSTIC TESTING     Radiology Results: I have personally reviewed pertinent reports. and I have personally reviewed pertinent films in PACS    MRI brain wo contrast   Final Result by Marion Philip MD (11/19 1609)      Acute lacunar infarct in the right centrum semiovale. The study was marked in [facility] for immediate notification. .      Workstation performed: [id number]         XR chest 1 view portable   Final Result by Akosua Nicole MD (11/19 1510)      Small right effusion and mild right base atelectasis. Workstation performed: [id number]         CTA stroke alert (head/neck)   Final Result by Marion Philip MD (11/18 4927)      No hemodynamically significant stenosis, dissection or occlusion of the carotid or vertebral arteries or major vessels of the [place]               Findings were directly discussed with Judith Ramos at  3:45 PM middle dose. Workstation performed: [id number]         CT stroke alert brain   Final Result by Marion Philip MD (11/18 1547)      No evidence of acute infarct, intracranial hemorrhage or mass. Findings were directly discussed with Judith Ramos at  3:40 PM  .      Workstation performed: [id number]             Other Diagnostic Testing: I have personally reviewed pertinent reports.       ACTIVE MEDICATIONS     Current Facility-Administered Medications   Medication Dose Route Frequency    albuterol (PROVENTIL HFA,VENTOLIN HFA) inhaler 2 puff  2 puff Inhalation Q4H PRN    allopurinol (ZYLOPRIM) tablet 150 mg  150 mg Oral Daily    aspirin chewable tablet 81 mg  81 mg Oral Daily    calcium carbonate (TUMS) chewable tablet 1,000 mg  1,000 mg Oral Daily PRN    ceftriaxone (ROCEPHIN) 1 g/50 mL in dextrose IVPB  1,000 mg Intravenous Q24H    clopidogrel (PLAVIX) tablet 75 mg  75 mg Oral Daily    colchicine (COLCRYS) tablet 0.6 mg  0.6 mg Oral Every Other Day    gabapentin (NEURONTIN) capsule 300 mg  300 mg Oral BID    heparin (porcine) subcutaneous injection 5,000 Units  5,000 Units Subcutaneous Q8H [address]    HYDROcodone-acetaminophen (NORCO) 5-325 mg per tablet 1 tablet  1 tablet Oral Q6H PRN    insulin lispro (HumaLOG) 100 units/mL subcutaneous injection 1-5 Units  1-5 Units Subcutaneous TID AC    insulin lispro (HumaLOG) 100 units/mL subcutaneous injection 1-5 Units  1-5 Units Subcutaneous HS    insulin lispro (HumaLOG) 100 units/mL subcutaneous injection 1-5 Units  1-5 Units Subcutaneous 0200    isosorbide mononitrate (IMDUR) 24 hr tablet 30 mg  30 mg Oral Daily    levothyroxine tablet 100 mcg  100 mcg Oral Early Morning    methocarbamol (ROBAXIN) tablet 750 mg  750 mg Oral Q6H PRN    metoprolol tartrate (LOPRESSOR) tablet 25 mg  25 mg Oral Q12H KARINE    ondansetron (ZOFRAN) injection 4 mg  4 mg Intravenous Q6H PRN    polyethylene glycol (MIRALAX) packet 17 g  17 g Oral Daily    pravastatin (PRAVACHOL) tablet 10 mg  10 mg Oral Daily With Dinner    predniSONE tablet 5 mg  5 mg Oral Daily    senna (SENOKOT) tablet 8.6 mg  8.6 mg Oral Daily    sertraline (ZOLOFT) tablet 25 mg  25 mg Oral Daily    torsemide (DEMADEX) tablet 20 mg  20 mg Oral BID       Prior to Admission medications    Medication Sig Start Date End Date Taking?  Authorizing Provider   acetaminophen (TYLENOL) 325 mg tablet Take 2 tablets (650 mg total) by mouth every 6 (six) hours as needed for mild pain  Patient not taking: Reported on 4/25/2023 5/26/22   Kellen Murrieta MD   acetaminophen (TYLENOL) 325 mg tablet Take 3 tablets (975 mg total) by mouth every 8 (eight) hours  Patient not taking: Reported on 4/25/2023 2/14/23   Luana Auguste ALICE Pérez   acetaminophen (TYLENOL) 500 mg tablet Take 1,000 mg by mouth every 6 (six) hours as needed for mild pain    Historical Provider, MD   albuterol (2.5 mg/3 mL) 0.083 % nebulizer solution Take 1 vial (2.5 mg total) by nebulization 3 (three) times a day  Patient taking differently: Take 2.5 mg by nebulization 3 (three) times a day as needed 2/27/20   Jake Beltran DO   albuterol (2.5 mg/3 mL) 0.083 % nebulizer solution Take 2.5 mg by nebulization every 6 (six) hours as needed for wheezing or shortness of breath    Historical Provider, MD   albuterol (PROVENTIL HFA,VENTOLIN HFA) 90 mcg/act inhaler Inhale 2 puffs every 4 (four) hours as needed for wheezing    Historical Provider, MD   albuterol (PROVENTIL HFA,VENTOLIN HFA) 90 mcg/act inhaler Inhale 2 puffs every 4 (four) hours as needed for wheezing 5/26/22   Vero Sanches MD   albuterol (PROVENTIL HFA,VENTOLIN HFA) 90 mcg/act inhaler Inhale 2 puffs every 6 (six) hours as needed for wheezing    Historical Provider, MD   allopurinol (ZYLOPRIM) 100 mg tablet Take 1.5 tablets by mouth daily     Historical Provider, MD   allopurinol (ZYLOPRIM) 300 mg tablet Take 0.5 tablets (150 mg total) by mouth daily 5/26/22   Vero Sanches MD   allopurinol (ZYLOPRIM) 300 mg tablet Take 200 mg by mouth daily    Historical Provider, MD   allopurinol (ZYLOPRIM) 300 mg tablet Take 0.5 tablets (150 mg total) by mouth daily 9/8/23   Ashish Robison MD   Ascorbic Acid (vitamin C) 1000 MG tablet Take 1,000 mg by mouth daily    Historical Provider, MD   ascorbic acid (VITAMIN C) 500 MG tablet Take 500 mg by mouth daily    Historical Provider, MD   calcium carbonate (OS-EMILY) 1250 (500 Ca) MG tablet Take 1 tablet by mouth daily    Historical Provider, MD   calcium carbonate (TUMS) 500 mg chewable tablet Chew 2 tablets (1,000 mg total) daily as needed for indigestion or heartburn 5/26/22   Vero Sanches MD   clopidogrel (PLAVIX) 75 mg tablet Take 1 tablet (75 mg total) by mouth daily 5/26/22   Arti Kilgore MD   clopidogrel (PLAVIX) 75 mg tablet Take 75 mg by mouth daily    Historical Provider, MD   clopidogrel (PLAVIX) 75 mg tablet TAKE 1 TABLET DAILY 5/5/23   Leonid Byrd DO   clopidogrel (PLAVIX) 75 mg tablet Take 75 mg by mouth daily    Historical Provider, MD   colchicine (COLCRYS) 0.6 mg tablet Take 0.6 mg by mouth as needed Every 3 days    Historical Provider, MD   docusate sodium (COLACE) 100 mg capsule Take 100 mg by mouth 2 (two) times a day as needed for constipation    Historical Provider, MD   docusate sodium (COLACE) 100 mg capsule Take 1 capsule (100 mg total) by mouth 2 (two) times a day  Patient taking differently: Take 100 mg by mouth 2 (two) times a day PRN 5/26/22   Arti Kilgore MD   docusate sodium (COLACE) 100 mg capsule Take 1 capsule (100 mg total) by mouth 2 (two) times a day 2/14/23   Kian Pérez PA-C   enoxaparin (LOVENOX) 30 mg/0.3 mL Inject 0.3 mL (30 mg total) under the skin daily for 21 days 5/26/22 6/16/22  Arti Kilgore MD   fenofibrate (FENOGLIDE) 120 MG TABS Take 1 tablet (120 mg total) by mouth daily  Patient not taking: Reported on 3/27/2023 9/14/22   Leonid Byrd DO   gabapentin (NEURONTIN) 300 mg capsule Take 300 mg by mouth 2 (two) times a day Take 300 mg in am and 600 mg at bedtime    Historical Provider, MD   gabapentin (NEURONTIN) 300 mg capsule Take 1 capsule (300 mg total) by mouth 3 (three) times a day 5/26/22   Arti Kilgore MD   gabapentin (NEURONTIN) 300 mg capsule Take 1 capsule (300 mg total) by mouth 2 (two) times a day 9/8/23   Karma Colbert MD   gabapentin (NEURONTIN) 600 MG tablet Take 1 tablet (600 mg total) by mouth daily at bedtime  Patient not taking: Reported on 3/27/2023 2/14/23   Kian Pérez PA-C   HYDROcodone-acetaminophen [facility]) 5-325 mg per tablet Take 1 tablet by mouth every 6 (six) hours as needed for pain Max Daily Amount: 4 tablets 6/2/22   NATHEN Campuzano HYDROcodone-acetaminophen (NORCO) 5-325 mg per tablet Take 1 tablet by mouth every 6 (six) hours as needed for pain    Historical Provider, MD   hydrocortisone (ANUSOL-HC) 25 mg suppository Insert 25 mg into the rectum 2 (two) times a day as needed    Historical Provider, MD   isosorbide mononitrate (IMDUR) 30 mg 24 hr tablet Take 1 tablet (30 mg total) by mouth daily 5/26/22   Darrius Redd MD   isosorbide mononitrate (IMDUR) 30 mg 24 hr tablet TAKE 1 TABLET DAILY 1/17/23   Thyra Mortimer, MD   isosorbide mononitrate (IMDUR) 30 mg 24 hr tablet Take 30 mg by mouth daily    Historical Provider, MD   isosorbide mononitrate (IMDUR) 30 mg 24 hr tablet Take 30 mg by mouth daily    Historical Provider, MD   levothyroxine 100 mcg tablet Take 1 tablet (100 mcg total) by mouth daily  Patient not taking: Reported on 4/25/2023 3/1/23   Robert Mejia MD   levothyroxine 100 mcg tablet Take 1 tablet (100 mcg total) by mouth daily 9/8/23   Ronn Harris MD   levothyroxine 112 mcg tablet Take 1 tablet (112 mcg total) by mouth daily in the early morning 5/27/22   Darrius Redd MD   lidocaine (LIDODERM) 5 % Apply 1 patch topically as needed Remove & Discard patch within 12 hours or as directed by MD [place] Provider, MD   lidocaine (LIDODERM) 5 % Apply 1 patch topically daily Remove & Discard patch within 12 hours or as directed by MD 5/26/22   Darrius Redd MD   lidocaine (LIDODERM) 5 % Apply 1 patch topically daily Remove & Discard patch within 12 hours or as directed by MD    Historical Provider, MD   Lidocaine HCl (Aspercreme Lidocaine) 4 % CREA Apply topically every 6 (six) hours as needed    Historical Provider, MD   loperamide (IMODIUM A-D) 2 MG tablet Take 1 tablet (2 mg total) by mouth 3 (three) times a day as needed for diarrhea 9/8/23   Ronn Harris MD   [place] 0.01 % ophthalmic drops Administer 1 drop to both eyes daily at bedtime   2/19/22   Historical Provider, MD   methenamine hippurate (HIPREX) 1 g tablet Take 1 g by mouth 2 (two) times a day with meals    Historical Provider, MD   methenamine hippurate (HIPREX) 1 g tablet Take 1 g by mouth 2 (two) times a day with meals    Historical Provider, MD   methocarbamol (ROBAXIN) 500 mg tablet Take 500 mg by mouth 3 (three) times a day as needed for muscle spasms    Historical Provider, MD   methocarbamol (ROBAXIN) 500 mg tablet Take 1 tablet (500 mg total) by mouth every 6 (six) hours as needed for muscle spasms 2/14/23   Anmol Pérez PA-C   methocarbamol (ROBAXIN) 750 mg tablet Take 1 tablet (750 mg total) by mouth every 6 (six) hours as needed for muscle spasms 5/26/22   Estelle Lan MD   metolazone (ZAROXOLYN) 5 mg tablet Take 1 tablet (5 mg total) by mouth as needed (if gains 2lbs in 2 days.)  Patient not taking: Reported on 8/27/2021 11/6/19   Livia Mandel MD   metoprolol tartrate (LOPRESSOR) 25 mg tablet Take 25 mg by mouth every 12 (twelve) hours    Historical Provider, MD   metoprolol tartrate (LOPRESSOR) 25 mg tablet TAKE 1 TABLET TWICE A DAY 11/2/23   Gianfranco Newman DO   metoprolol tartrate (LOPRESSOR) 25 mg tablet Take 25 mg by mouth every 12 (twelve) hours    Historical Provider, MD   Multiple Vitamin (multivitamin) capsule Take 1 capsule by mouth daily  Patient not taking: Reported on 10/15/2021     Historical Provider, MD   Multiple Vitamins-Calcium (MULTI-DAY/CALCIUM/EXTRA IRON PO) Take 1 tablet by mouth daily    Historical Provider, MD   Multiple Vitamins-Minerals (multivitamin with iron-minerals) liquid Take 15 mL by mouth daily 5/26/22   Estelle Lan MD   nitroglycerin (NITROSTAT) 0.4 mg SL tablet  1/29/20   Historical Provider, MD   nystatin (MYCOSTATIN) powder Apply topically 2 (two) times a day 2/14/23   Anmol Pérez PA-C   polyethylene glycol (MIRALAX) 17 g packet Take 17 g by mouth daily Do not start before February 15, 2023. 2/15/23   Ana Al PA-C   predniSONE 5 mg tablet 5 mg as needed    Historical Provider, MD   predniSONE 5 mg tablet Take by mouth daily    Historical Provider, MD   Red Yeast Rice Extract (RED YEAST RICE PO) Take 600 mg by mouth daily    Historical Provider, MD   senna (SENOKOT) 8.6 mg Take 1 tablet (8.6 mg total) by mouth daily 5/26/22   Francie Castillo MD   senna (SENOKOT) 8.6 mg Take 2 tablets (17.2 mg total) by mouth daily Do not start before February 15, 2023. Patient not taking: Reported on 3/27/2023 2/15/23   Adair Pérez PA-C   sertraline (ZOLOFT) 25 mg tablet 25 mg daily at bedtime  5/12/18   Historical Provider, MD   sertraline (ZOLOFT) 25 mg tablet Take 1 tablet (25 mg total) by mouth daily 5/26/22   Francie Castillo MD   sertraline (ZOLOFT) 25 mg tablet Take 25 mg by mouth daily    Historical Provider, MD   sertraline (ZOLOFT) 50 mg tablet Take 50 mg by mouth daily at bedtime  Patient not taking: Reported on 4/25/2023    Historical Provider, MD   torsemide (DEMADEX) 20 mg tablet Take 1 tablet (20 mg total) by mouth 2 (two) times a day May take one extra dose as needed for wt gain,increased sob or increased edema. 6/7/22   Goran Walter DO   torsemide (DEMADEX) 20 mg tablet Take 2 tablets (40 mg total) by mouth 2 (two) times a day 3/1/23   Kaushik Navarro MD   torsemide (DEMADEX) 20 mg tablet Take 20 mg by mouth 2 (two) times a day    Historical Provider, MD   torsemide 40 MG TABS Take 40 mg by mouth daily  Patient taking differently: Take 20 mg by mouth 2 (two) times a day 5/26/22   Francie Castillo MD         VTE Pharmacologic Prophylaxis: Heparin  VTE Mechanical Prophylaxis: sequential compression device    ======    I have discussed the patient's history, physical exam findings, assessment, and plan in detail with attending, Dr. Archie Ha    Thank you for allowing me to participate in the care of your patient, Mauricio More.     Hal Gomez MD  [facility] Neurology Residency, PGY-1

## 2023-11-20 NOTE — ASSESSMENT & PLAN NOTE
Lab Results   Component Value Date    EGFR 28 11/20/2023    EGFR 30 11/19/2023    EGFR 33 11/18/2023    CREATININE 1.61 (H) 11/20/2023    CREATININE 1.52 (H) 11/19/2023    CREATININE 1.41 (H) 11/18/2023   Monitor renal function

## 2023-11-20 NOTE — PLAN OF CARE
- Consider nutrition services referral as needed  Outcome: Progressing     Problem: NEUROSENSORY - ADULT  Goal: Achieves stable or improved neurological status  Description: INTERVENTIONS  - Monitor and report changes in neurological status  - Monitor vital signs such as temperature, blood pressure, glucose, and any other labs ordered   - Initiate measures to prevent increased intracranial pressure  - Monitor for seizure activity and implement precautions if appropriate      Outcome: Progressing  Goal: Remains free of injury related to seizures activity  Description: INTERVENTIONS  - Maintain airway, patient safety  and administer oxygen as ordered  - Monitor patient for seizure activity, document and report duration and description of seizure to physician/advanced practitioner  - If seizure occurs,  ensure patient safety during seizure  - Reorient patient post seizure  - Seizure pads on all 4 side rails  - Instruct patient/family to notify RN of any seizure activity including if an aura is experienced  - Instruct patient/family to call for assistance with activity based on nursing assessment  - Administer anti-seizure medications if ordered    Outcome: Progressing  Goal: Achieves maximal functionality and self care  Description: INTERVENTIONS  - Monitor swallowing and airway patency with patient fatigue and changes in neurological status  - Encourage and assist patient to increase activity and self care.    - Encourage visually impaired, hearing impaired and aphasic patients to use assistive/communication devices  Outcome: Progressing

## 2023-11-20 NOTE — QUICK NOTE
Pt type2 diabietic will order insulin sliding scale for now.  Monitor and trend if in need to adding long acting over night

## 2023-11-20 NOTE — WOUND OSTOMY CARE
Consult Note - Wound   Cailin Barriga 80 y.o. female MRN: [id number]  Unit/Bed#: [facility] 710-01 Encounter: [id number]        History and Present Illness:  Patient is 79 yo female admitted to [facility] with stroke-like symptoms. Patient has history of DM2, lupus, CKDIV, RA, and fibromyalgia. Patient is incontinent of bowel and bladder. Patient is max assist with turning from side to side for assessment. Patient is independent with meals. Daughter at bedside. Assessment Findings:   Sacral/buttocks and B/L heels intact and blanching, preventative skin care orders placed. POA DTI labia/perineum- purple, intact, nonblanchable erythema, no drainage present. Recommend that patient spends more time in bed and on side, as daughter reports that patient spends significant time in chair and at times on donut cushion. Educated [place] cushion would preferable off-loading tool to donut but daughter declined stating patient did not like waffle cushion. Educated patient and daughter off-loading would be best medicine with this type of wound. Daughter requested pure wick for patient as patient is highly incontinent, stating that if pure wick was in perfect position there would be no issue with wound. Educated patient and daughter that likely pure wick could potentially cause more harm than good and wound care does not recommend pure wick when there is wound present on labia. Daughter inquired if hospital bed would be appropriate for patient and agreed hospital bed would benefit patient, however patient's daughter might have to discuss with case management on how to obtain hospital bed. No induration, fluctuance, odor, warmth/temperature differences, redness, or purulence noted to the above noted wounds and skin areas assessed. New dressings applied per orders listed below. Patient tolerated well- no s/s of non-verbal pain or discomfort observed during the encounter. Bedside nurse aware of plan of care.  See flow sheets for more detailed assessment findings. Wound care will continue to follow. Skin care plans:  1-Apply hydraguard to sacral/buttocks/labia and B/L heels BID and PRN  2-Elevate heels to offload pressure. 3-Ehob cushion in chair when out of bed. 4-Moisturize skin daily with skin nourishing cream.  5-Turn/reposition q2h or when medically stable for pressure re-distribution on skin. Wounds:  Wound 09/05/23 Pressure Injury Perineum Inner (Active)   Wound Image   11/20/23 1500   Wound Description Non-blanchable erythema;Epithelialization;Fragile 11/20/23 1500   Pressure Injury Stage DTPI 11/20/23 1500   Olga-wound Assessment Fragile; Hyperpigmented 11/20/23 1500   Wound Length (cm) 12 cm 11/20/23 1500   Wound Width (cm) 7 cm 11/20/23 1500   Wound Depth (cm) 0 cm 11/20/23 1500   Wound Surface Area (cm^2) 84 cm^2 11/20/23 1500   Wound Volume (cm^3) 0 cm^3 11/20/23 1500   Calculated Wound Volume (cm^3) 0 cm^3 11/20/23 1500   Tunneling 0 cm 11/20/23 1500   Tunneling in depth located at 0 11/20/23 1500   Undermining 0 11/20/23 1500   Undermining is depth extending from 0 11/20/23 1500   Wound Site Closure PUSHPA 11/20/23 1500   Drainage Amount None 11/20/23 1500   Non-staged Wound Description Not applicable 34/59/44 1667   Treatments Cleansed 11/20/23 1500   Dressing Moisture barrier 11/20/23 1500   Wound packed? No 11/20/23 1500   Packing- # removed 0 11/20/23 1500   Packing- # inserted 0 11/20/23 1500   Dressing Changed New 11/20/23 1500   Patient Tolerance Tolerated well 11/20/23 1500   Dressing Status Clean;Dry; Intact 11/20/23 1500               Agustina GHOSHN, RN, [facility]

## 2023-11-21 LAB
ANION GAP SERPL CALCULATED.3IONS-SCNC: 10 MMOL/L
BACTERIA UR CULT: ABNORMAL
BACTERIA UR CULT: ABNORMAL
BUN SERPL-MCNC: 58 MG/DL (ref 5–25)
CALCIUM SERPL-MCNC: 8.6 MG/DL (ref 8.4–10.2)
CHLORIDE SERPL-SCNC: 96 MMOL/L (ref 96–108)
CO2 SERPL-SCNC: 31 MMOL/L (ref 21–32)
CREAT SERPL-MCNC: 1.8 MG/DL (ref 0.6–1.3)
ERYTHROCYTE [DISTWIDTH] IN BLOOD BY AUTOMATED COUNT: 15.7 % (ref 11.6–15.1)
FERRITIN SERPL-MCNC: 143 NG/ML (ref 11–307)
GFR SERPL CREATININE-BSD FRML MDRD: 24 ML/MIN/1.73SQ M
GLUCOSE SERPL-MCNC: 102 MG/DL (ref 65–140)
GLUCOSE SERPL-MCNC: 108 MG/DL (ref 65–140)
GLUCOSE SERPL-MCNC: 118 MG/DL (ref 65–140)
GLUCOSE SERPL-MCNC: 165 MG/DL (ref 65–140)
GLUCOSE SERPL-MCNC: 185 MG/DL (ref 65–140)
GLUCOSE SERPL-MCNC: 300 MG/DL (ref 65–140)
HCT VFR BLD AUTO: 32 % (ref 34.8–46.1)
HGB BLD-MCNC: 10.5 G/DL (ref 11.5–15.4)
HGB RETIC QN AUTO: 37.8 PG (ref 30–38.3)
IMM RETICS NFR: 40.1 % (ref 0–14)
IRON SATN MFR SERPL: 30 % (ref 15–50)
IRON SERPL-MCNC: 81 UG/DL (ref 50–212)
MAGNESIUM SERPL-MCNC: 2.4 MG/DL (ref 1.9–2.7)
MCH RBC QN AUTO: 35.5 PG (ref 26.8–34.3)
MCHC RBC AUTO-ENTMCNC: 32.8 G/DL (ref 31.4–37.4)
MCV RBC AUTO: 108 FL (ref 82–98)
PLATELET # BLD AUTO: 138 THOUSANDS/UL (ref 149–390)
PMV BLD AUTO: 9.9 FL (ref 8.9–12.7)
POTASSIUM SERPL-SCNC: 4.2 MMOL/L (ref 3.5–5.3)
RBC # BLD AUTO: 2.96 MILLION/UL (ref 3.81–5.12)
RETICS # AUTO: ABNORMAL 10*3/UL (ref 14097–95744)
RETICS # CALC: 2.41 % (ref 0.37–1.87)
SODIUM SERPL-SCNC: 137 MMOL/L (ref 135–147)
TIBC SERPL-MCNC: 269 UG/DL (ref 250–450)
UIBC SERPL-MCNC: 188 UG/DL (ref 155–355)
VIT B12 SERPL-MCNC: 223 PG/ML (ref 180–914)
WBC # BLD AUTO: 6.59 THOUSAND/UL (ref 4.31–10.16)

## 2023-11-21 PROCEDURE — 82728 ASSAY OF FERRITIN: CPT | Performed by: INTERNAL MEDICINE

## 2023-11-21 PROCEDURE — 82607 VITAMIN B-12: CPT | Performed by: INTERNAL MEDICINE

## 2023-11-21 PROCEDURE — 97530 THERAPEUTIC ACTIVITIES: CPT

## 2023-11-21 PROCEDURE — 82948 REAGENT STRIP/BLOOD GLUCOSE: CPT

## 2023-11-21 PROCEDURE — 85027 COMPLETE CBC AUTOMATED: CPT | Performed by: STUDENT IN AN ORGANIZED HEALTH CARE EDUCATION/TRAINING PROGRAM

## 2023-11-21 PROCEDURE — 83540 ASSAY OF IRON: CPT | Performed by: INTERNAL MEDICINE

## 2023-11-21 PROCEDURE — 83550 IRON BINDING TEST: CPT | Performed by: INTERNAL MEDICINE

## 2023-11-21 PROCEDURE — 80048 BASIC METABOLIC PNL TOTAL CA: CPT | Performed by: STUDENT IN AN ORGANIZED HEALTH CARE EDUCATION/TRAINING PROGRAM

## 2023-11-21 PROCEDURE — 83735 ASSAY OF MAGNESIUM: CPT | Performed by: STUDENT IN AN ORGANIZED HEALTH CARE EDUCATION/TRAINING PROGRAM

## 2023-11-21 PROCEDURE — 99232 SBSQ HOSP IP/OBS MODERATE 35: CPT | Performed by: INTERNAL MEDICINE

## 2023-11-21 PROCEDURE — 85046 RETICYTE/HGB CONCENTRATE: CPT | Performed by: INTERNAL MEDICINE

## 2023-11-21 RX ORDER — TORSEMIDE 20 MG/1
40 TABLET ORAL ONCE
Status: COMPLETED | OUTPATIENT
Start: 2023-11-21 | End: 2023-11-21

## 2023-11-21 RX ORDER — TORSEMIDE 20 MG/1
20 TABLET ORAL 2 TIMES DAILY
Status: DISCONTINUED | OUTPATIENT
Start: 2023-11-22 | End: 2023-11-22

## 2023-11-21 RX ADMIN — HEPARIN SODIUM 5000 UNITS: 5000 INJECTION INTRAVENOUS; SUBCUTANEOUS at 22:08

## 2023-11-21 RX ADMIN — METOPROLOL TARTRATE 25 MG: 25 TABLET, FILM COATED ORAL at 22:16

## 2023-11-21 RX ADMIN — INSULIN LISPRO 1 UNITS: 100 INJECTION, SOLUTION INTRAVENOUS; SUBCUTANEOUS at 16:55

## 2023-11-21 RX ADMIN — ISOSORBIDE MONONITRATE 30 MG: 30 TABLET, EXTENDED RELEASE ORAL at 10:59

## 2023-11-21 RX ADMIN — CLOPIDOGREL BISULFATE 75 MG: 75 TABLET ORAL at 11:01

## 2023-11-21 RX ADMIN — GABAPENTIN 300 MG: 300 CAPSULE ORAL at 22:06

## 2023-11-21 RX ADMIN — HEPARIN SODIUM 5000 UNITS: 5000 INJECTION INTRAVENOUS; SUBCUTANEOUS at 06:15

## 2023-11-21 RX ADMIN — ASPIRIN 81 MG CHEWABLE TABLET 81 MG: 81 TABLET CHEWABLE at 11:00

## 2023-11-21 RX ADMIN — INSULIN GLARGINE 5 UNITS: 100 INJECTION, SOLUTION SUBCUTANEOUS at 23:54

## 2023-11-21 RX ADMIN — PRAVASTATIN SODIUM 10 MG: 10 TABLET ORAL at 16:50

## 2023-11-21 RX ADMIN — HYDROCODONE BITARTRATE AND ACETAMINOPHEN 1 TABLET: 5; 325 TABLET ORAL at 16:50

## 2023-11-21 RX ADMIN — ALLOPURINOL 150 MG: 300 TABLET ORAL at 11:01

## 2023-11-21 RX ADMIN — PREDNISONE 5 MG: 5 TABLET ORAL at 11:00

## 2023-11-21 RX ADMIN — Medication 2 G: at 11:02

## 2023-11-21 RX ADMIN — INSULIN LISPRO 3 UNITS: 100 INJECTION, SOLUTION INTRAVENOUS; SUBCUTANEOUS at 11:31

## 2023-11-21 RX ADMIN — TORSEMIDE 20 MG: 20 TABLET ORAL at 10:59

## 2023-11-21 RX ADMIN — HEPARIN SODIUM 5000 UNITS: 5000 INJECTION INTRAVENOUS; SUBCUTANEOUS at 13:55

## 2023-11-21 RX ADMIN — SERTRALINE 25 MG: 25 TABLET, FILM COATED ORAL at 11:00

## 2023-11-21 RX ADMIN — CEFTRIAXONE SODIUM 1000 MG: 10 INJECTION, POWDER, FOR SOLUTION INTRAVENOUS at 17:00

## 2023-11-21 RX ADMIN — Medication 2 G: at 17:00

## 2023-11-21 RX ADMIN — GABAPENTIN 300 MG: 300 CAPSULE ORAL at 11:01

## 2023-11-21 RX ADMIN — TORSEMIDE 40 MG: 20 TABLET ORAL at 13:55

## 2023-11-21 RX ADMIN — INSULIN LISPRO 1 UNITS: 100 INJECTION, SOLUTION INTRAVENOUS; SUBCUTANEOUS at 22:21

## 2023-11-21 RX ADMIN — Medication 2 G: at 22:07

## 2023-11-21 RX ADMIN — METOPROLOL TARTRATE 25 MG: 25 TABLET, FILM COATED ORAL at 11:01

## 2023-11-21 RX ADMIN — LEVOTHYROXINE SODIUM 100 MCG: 100 TABLET ORAL at 06:15

## 2023-11-21 RX ADMIN — SENNOSIDES 8.6 MG: 8.6 TABLET, FILM COATED ORAL at 11:00

## 2023-11-21 NOTE — PLAN OF CARE
Problem: PAIN - ADULT  Goal: Verbalizes/displays adequate comfort level or baseline comfort level  Description: Interventions:  - Encourage patient to monitor pain and request assistance  - Assess pain using appropriate pain scale  - Administer analgesics based on type and severity of pain and evaluate response  - Implement non-pharmacological measures as appropriate and evaluate response  - Consider cultural and social influences on pain and pain management  - Notify physician/advanced practitioner if interventions unsuccessful or patient reports new pain  Outcome: Progressing     Problem: SAFETY ADULT  Goal: Patient will remain free of falls  Description: INTERVENTIONS:  - Educate patient/family on patient safety including physical limitations  - Instruct patient to call for assistance with activity   - Consult OT/PT to assist with strengthening/mobility   - Keep Call bell within reach  - Keep bed low and locked with side rails adjusted as appropriate  - Keep care items and personal belongings within reach  - Initiate and maintain comfort rounds  - Make Fall Risk Sign visible to staff  - Offer Toileting every 2 Hours, in advance of need  - Initiate/Maintain bed alarm  - Obtain necessary fall risk management equipment: non skid socks  - Apply yellow socks and bracelet for high fall risk patients  - Consider moving patient to room near nurses station  Outcome: Progressing     Problem: Knowledge Deficit  Goal: Patient/family/caregiver demonstrates understanding of disease process, treatment plan, medications, and discharge instructions  Description: Complete learning assessment and assess knowledge base.   Interventions:  - Provide teaching at level of understanding  - Provide teaching via preferred learning methods  Outcome: Progressing     Problem: NEUROSENSORY - ADULT  Goal: Achieves stable or improved neurological status  Description: INTERVENTIONS  - Monitor and report changes in neurological status  - Monitor vital signs such as temperature, blood pressure, glucose, and any other labs ordered   - Initiate measures to prevent increased intracranial pressure  - Monitor for seizure activity and implement precautions if appropriate      Outcome: Progressing  Goal: Remains free of injury related to seizures activity  Description: INTERVENTIONS  - Maintain airway, patient safety  and administer oxygen as ordered  - Monitor patient for seizure activity, document and report duration and description of seizure to physician/advanced practitioner  - If seizure occurs,  ensure patient safety during seizure  - Reorient patient post seizure  - Seizure pads on all 4 side rails  - Instruct patient/family to notify RN of any seizure activity including if an aura is experienced  - Instruct patient/family to call for assistance with activity based on nursing assessment  - Administer anti-seizure medications if ordered    Outcome: Progressing  Goal: Achieves maximal functionality and self care  Description: INTERVENTIONS  - Monitor swallowing and airway patency with patient fatigue and changes in neurological status  - Encourage and assist patient to increase activity and self care.    - Encourage visually impaired, hearing impaired and aphasic patients to use assistive/communication devices  Outcome: Progressing

## 2023-11-21 NOTE — PLAN OF CARE
Problem: PHYSICAL THERAPY ADULT  Goal: Performs mobility at highest level of function for planned discharge setting. See evaluation for individualized goals. Description: Treatment/Interventions: Functional transfer training, LE strengthening/ROM, Therapeutic exercise, Endurance training, Patient/family training, Equipment eval/education, Bed mobility, Gait training, Spoke to nursing, Spoke to case management, OT          See flowsheet documentation for full assessment, interventions and recommendations. Outcome: Progressing  Note: Prognosis: Fair  Problem List: Decreased strength, Decreased endurance, Impaired balance, Decreased mobility, Impaired judgement, Decreased cognition, Decreased safety awareness, Decreased coordination, Pain, Obesity  Assessment: Pt agreeable to participate in PT session. Pt performed functional mobility as outlined above. Forward flexed with static standing, VC for upright posture. 3rd STS attempt best attempt with external cue for upright posture. Posterior drift while sitting EOB and eating lunch. VC to address same. Pt left supine in bed with bed alarm, call bell, phone, and all personal needs within reach. Pt will continue to benefit from skilled acute care PT to further address their functional mobility limitations. Based on pt presentation and PT clinical judgement, believe pt would best benefit from STR. Barriers to Discharge: Decreased caregiver support, Inaccessible home environment     Rehab Resource Intensity Level, PT: II (Moderate Resource Intensity)    See flowsheet documentation for full assessment.

## 2023-11-21 NOTE — PROGRESS NOTES
[facility] [facility]  Progress Note  Name: Erlinda Renteria  MRN: [id number]  Unit/Bed#: [facility] 710-01 I Date of Admission: 11/18/2023   Date of Service: 11/21/2023 I Hospital Day: 3    Assessment/Plan   Goals of care, counseling/discussion  Assessment & Plan  Of note, patient is followed by [facility] palliative care. Note patient's outpatient notes. Confirmed with granddaughter that she is a level 3 DNR. Jeannine Brown She has been followed by [place] palliative care due to history of heart failure. We will continue with aggressive medical management and work-up for stroke. Family is agreeable to stroke pathway work-up and recommendations. Patient presents with left-sided weakness which is gradually improving on presentation to the ED.     Hyperlipidemia  Assessment & Plan  Patient reportedly intolerant to statins, but in review of her recent meds she does not appear to have trialed too many low intensity statins, discussed with patient and family at bedside and will try lowest dose pravastatin at 10 mg daily at this time    DM2 (diabetes mellitus, type 2) ([facility])  Assessment & Plan    Lab Results   Component Value Date    HGBA1C 7.0 (H) 11/19/2023   Sliding-scale insulin    Rheumatoid arthritis ([address])  Assessment & Plan  Continue prednisone 5 mg daily    CHF (congestive heart failure) ([address])  Assessment & Plan  Wt Readings from Last 3 Encounters:   11/20/23 73.9 kg (163 lb)   11/19/23 73.9 kg (163 lb)   09/05/23 72.6 kg (160 lb)       Continue with Plavix, Imdur, Lopressor and torsemide 20 mg am and 40mg pm  Patient followed by palliative care as an outpatient with [place] care at [address] [facility] 4 chronic kidney disease [facility])  Assessment & Plan  Lab Results   Component Value Date    EGFR 24 11/21/2023    EGFR 28 11/20/2023    EGFR 30 11/19/2023    CREATININE 1.80 (H) 11/21/2023    CREATININE 1.61 (H) 11/20/2023    CREATININE 1.52 (H) 11/19/2023   Monitor renal function  Slightly incrased today but within baseline in setting of ckd  Increase torsemide to 40mg evening dose due to lower extremity edema, crackles on exam    SLE (systemic lupus erythematosus) ([address])  Assessment & Plan  Currently on prednisone. Daughter confirms that she is taking 5 mg daily    Hypothyroidism  Assessment & Plan  Levothyroxine 100 daily. Patient previously was taking 112 which was changed to 100. We will continue with levothyroxine at 100 mcg daily    * Stroke-like symptoms  Assessment & Plan  Patient had prehospital stroke alert initiated. Admitted under stroke pathway  MRI with "Acute lacunar infarct in the right centrum semiovale."  Patient with improving strength in RUE, but continues to have some weakness  Continue stroke pathway  Appreciate neuro recommendations  Awaiting echocardiogram  PT/ OT      Lethargy-resolved as of 11/20/2023  Assessment & Plan  MRI pending  Patient started on Rocephin empirically for possible urinary tract infection. Follow-up on final cultures  Continue with antibiotic therapy  Continue with stroke pathway work-up as above. PT/OT               VTE Pharmacologic Prophylaxis:   Moderate Risk (Score 3-4) - Pharmacological DVT Prophylaxis Ordered: heparin. Mobility:   Basic Mobility Inpatient Raw Score: 7  -HLM Goal: 2: Bed activities/Dependent transfer  JH-HLM Achieved: 5: Stand (1 or more minutes)  HLM Goal achieved. Continue to encourage appropriate mobility. Patient Centered Rounds: I performed bedside rounds with nursing staff today. Discussions with Specialists or Other Care Team Provider: neuro    Education and Discussions with Family / Patient: Updated  (daughter) at bedside. Total Time Spent on Date of Encounter in care of patient: 35 mins.  This time was spent on one or more of the following: performing physical exam; counseling and coordination of care; obtaining or reviewing history; documenting in the medical record; reviewing/ordering tests, medications or procedures; communicating with other healthcare professionals and discussing with patient's family/caregivers. Current Length of Stay: 3 day(s)  Current Patient Status: Inpatient   Certification Statement: The patient will continue to require additional inpatient hospital stay due to pending placement  Discharge Plan: Anticipate discharge in 24-48 hrs to rehab facility. Code Status: Level 3 - DNAR and DNI    Subjective:   Patient denies any acute complaints    Objective:     Vitals:   Temp (24hrs), Av °F (36.7 °C), Min:97.4 °F (36.3 °C), Max:99.1 °F (37.3 °C)    Temp:  [97.4 °F (36.3 °C)-99.1 °F (37.3 °C)] 98 °F (36.7 °C)  HR:  [65-71] 67  Resp:  [11-18] 16  BP: (113-146)/(54-62) 133/60  SpO2:  [92 %-98 %] 95 %  Body mass index is 32.92 kg/m². Input and Output Summary (last 24 hours): Intake/Output Summary (Last 24 hours) at 2023 1632  Last data filed at 2023 0801  Gross per 24 hour   Intake 600 ml   Output --   Net 600 ml       Physical Exam:   Physical Exam  Vitals reviewed. Constitutional:       General: She is not in acute distress. Appearance: She is obese. She is not ill-appearing. HENT:      Head: Normocephalic. Mouth/Throat:      Mouth: Mucous membranes are moist.   Eyes:      General: No scleral icterus. Extraocular Movements: Extraocular movements intact. Cardiovascular:      Rate and Rhythm: Normal rate and regular rhythm. Pulses: Normal pulses. Heart sounds: No murmur heard. No friction rub. No gallop. Pulmonary:      Effort: Pulmonary effort is normal. No respiratory distress. Breath sounds: No wheezing, rhonchi or rales. Abdominal:      General: Abdomen is flat. Bowel sounds are normal. There is no distension. Palpations: Abdomen is soft. Tenderness: There is no abdominal tenderness. There is no guarding or rebound. Musculoskeletal:      Right lower leg: No edema. Left lower leg: No edema.    Skin:     General: Skin is warm. Capillary Refill: Capillary refill takes less than 2 seconds. Coloration: Skin is not jaundiced. Findings: No rash. Neurological:      General: No focal deficit present. Mental Status: She is alert and oriented to person, place, and time. Sensory: No sensory deficit. Motor: Weakness present. Comments: LLE 2/5 strength   Psychiatric:         Mood and Affect: Mood normal.         Behavior: Behavior normal.          Additional Data:     Labs:  Results from last 7 days   Lab Units 11/21/23  0616 11/19/23  0844   WBC Thousand/uL 6.59 7.11   HEMOGLOBIN g/dL 10.5* 12.1   HEMATOCRIT % 32.0* 38.2   PLATELETS Thousands/uL 138* 166   NEUTROS PCT %  --  68   LYMPHS PCT %  --  25   MONOS PCT %  --  5   EOS PCT %  --  0     Results from last 7 days   Lab Units 11/21/23  0616 11/20/23  0513 11/19/23  0844   SODIUM mmol/L 137   < > 138   POTASSIUM mmol/L 4.2   < > 3.9   CHLORIDE mmol/L 96   < > 93*   CO2 mmol/L 31   < > 33*   BUN mg/dL 58*   < > 58*   CREATININE mg/dL 1.80*   < > 1.52*   ANION GAP mmol/L 10   < > 12   CALCIUM mg/dL 8.6   < > 9.1   ALBUMIN g/dL  --   --  3.7   TOTAL BILIRUBIN mg/dL  --   --  0.38   ALK PHOS U/L  --   --  64   ALT U/L  --   --  17   AST U/L  --   --  20   GLUCOSE RANDOM mg/dL 102   < > 199*    < > = values in this interval not displayed.      Results from last 7 days   Lab Units 11/18/23  1611   INR  0.94     Results from last 7 days   Lab Units 11/21/23  1602 11/21/23  1130 11/21/23  0639 11/21/23  0251 11/20/23  2119 11/20/23  1624 11/20/23  1555 11/20/23  1050 11/20/23  0608 11/20/23  0201 11/18/23  1533 11/18/23  1517   POC GLUCOSE mg/dl 165* 300* 108 118 136 291* 300* 144* 165* 200* 129 130     Results from last 7 days   Lab Units 11/19/23  0844   HEMOGLOBIN A1C % 7.0*     Results from last 7 days   Lab Units 11/18/23  1520   PROCALCITONIN ng/ml 0.10       Lines/Drains:  Invasive Devices       Peripheral Intravenous Line  Duration Peripheral IV 08/06/21 Right Forearm 837 days    Peripheral IV 11/18/23 Left Antecubital 3 days              Drain  Duration             External Urinary Catheter 550 days                          Imaging: No pertinent imaging reviewed. Recent Cultures (last 7 days):   Results from last 7 days   Lab Units 11/18/23  1805 11/18/23  1612   BLOOD CULTURE  No Growth at 48 hrs. No Growth at 48 hrs.   --    URINE CULTURE   --  >100,000 cfu/ml Aerococcus urinae*  80,000-89,000 cfu/ml Escherichia coli*       Last 24 Hours Medication List:   Current Facility-Administered Medications   Medication Dose Route Frequency Provider Last Rate    albuterol  2 puff Inhalation Q4H PRN Hetul Shine, DO      allopurinol  150 mg Oral Daily Hetul Shine, DO      aspirin  81 mg Oral Daily Aun Sanket      calcium carbonate  1,000 mg Oral Daily PRN Hetul Shine, DO      clopidogrel  75 mg Oral Daily Hetul Shine, DO      colchicine  0.6 mg Oral Every Other Day Hetul Shine, DO      Diclofenac Sodium  2 g Topical 4x Daily Cathlyn Cranker      gabapentin  300 mg Oral BID Hetul Shine, DO      heparin (porcine)  5,000 Units Subcutaneous Q8H [address] Hetul Shine, DO      HYDROcodone-acetaminophen  1 tablet Oral Q6H PRN Cathlyn Cranker      insulin glargine  5 Units Subcutaneous HS Josem Shone Irwin      insulin lispro  1-5 Units Subcutaneous TID AC NATHEN Brown      insulin lispro  1-5 Units Subcutaneous HS Michelle Dire, CRMONET      insulin lispro  1-5 Units Subcutaneous 0200 Michelle Dire, CRNP      isosorbide mononitrate  30 mg Oral Daily Hetul Shine, DO      levothyroxine  100 mcg Oral Early Morning Hetul Shine, DO      methocarbamol  750 mg Oral Q6H PRN Hetul Shine, DO      metoprolol tartrate  25 mg Oral Q12H [address] Hetul Shine, DO      ondansetron  4 mg Intravenous Q6H PRN Hetul Shine, DO      polyethylene glycol  17 g Oral Daily Hetul Shine, DO      pravastatin  10 mg Oral Daily With Dinner Cathlyn Cranker      predniSONE  5 mg Oral Daily Jomar Shine DO      senna  8.6 mg Oral Daily Jomar Shine DO      sertraline  25 mg Oral Daily Jomar Shine DO      [START ON 11/22/2023] torsemide  20 mg Oral BID Alcides Lopez DO          Today, Patient Was Seen By: Alcides Lopez DO    **Please Note: This note may have been constructed using a voice recognition system. **

## 2023-11-21 NOTE — ASSESSMENT & PLAN NOTE
Wt Readings from Last 3 Encounters:   11/20/23 73.9 kg (163 lb)   11/19/23 73.9 kg (163 lb)   09/05/23 72.6 kg (160 lb)       Continue with Plavix, Imdur, Lopressor and torsemide 20 mg am and 40mg pm  Patient followed by palliative care as an outpatient with [place] care at [facility]

## 2023-11-21 NOTE — ASSESSMENT & PLAN NOTE
Of note, patient is followed by [facility] palliative care. Note patient's outpatient notes. Confirmed with granddaughter that she is a level 3 DNR. Deepti Mccain She has been followed by [place] palliative care due to history of heart failure. We will continue with aggressive medical management and work-up for stroke. Family is agreeable to stroke pathway work-up and recommendations. Patient presents with left-sided weakness which is gradually improving on presentation to the ED.

## 2023-11-21 NOTE — PHYSICAL THERAPY NOTE
PHYSICAL THERAPY NOTE          Patient Name: Lan Larson  Today's Date: 11/21/2023 11/21/23 1157   PT Last Visit   PT Visit Date 11/21/23   Note Type   Note Type Treatment   Pain Assessment   Pain Assessment Tool FLACC   Pain Location/Orientation Orientation: Right;Location: Knee   Hospital Pain Intervention(s) Repositioned; Ambulation/increased activity; Emotional support;Relaxation technique   Pain Rating: FLACC (Rest) - Face 0   Pain Rating: FLACC (Rest) - Legs 0   Pain Rating: FLACC (Rest) - Activity 0   Pain Rating: FLACC (Rest) - Cry 1   Pain Rating: FLACC (Rest) - Consolability 1   Score: FLACC (Rest) 2   Pain Rating: FLACC (Activity) - Face 0   Pain Rating: FLACC (Activity) - Legs 0   Pain Rating: FLACC (Activity) - Activity 0   Pain Rating: FLACC (Activity) - Cry 1   Pain Rating: FLACC (Activity) - Consolability 1   Score: FLACC (Activity) 2   Restrictions/Precautions   Weight Bearing Precautions Per Order No   Other Precautions Cognitive; Chair Alarm; Bed Alarm;Multiple lines;Telemetry; Fall Risk   General   Chart Reviewed Yes   Response to Previous Treatment Patient with no complaints from previous session. Family/Caregiver Present Yes  (daughter)   Cognition   Overall Cognitive Status Impaired   Arousal/Participation Cooperative   Attention Attends with cues to redirect   Orientation Level Oriented to person;Oriented to place;Oriented to situation;Disoriented to time   Following Commands Follows one step commands without difficulty   Comments very pleasant to work with   Subjective   Subjective agreeable to participate   Bed Mobility   Rolling R 2  Maximal assistance   Additional items Assist x 1; Increased time required;Verbal cues;LE management   Rolling L 2  Maximal assistance   Additional items Assist x 1; Increased time required;LE management   Supine to Sit 2  Maximal assistance   Additional items Assist x 1;HOB elevated; Increased time required;Verbal cues;LE management   Sit to Supine 2  Maximal assistance   Additional items Assist x 1; Increased time required;Verbal cues;LE management   Additional Comments unable to clear buttock with bridging to boost self up in bed   Transfers   Sit to Stand 2  Maximal assistance   Additional items Assist x 2; Increased time required;Verbal cues   Stand to Sit 2  Maximal assistance   Additional items Assist x 2; Increased time required;Verbal cues   Additional Comments x3 STS with b/l HHA and knee block standing for ~30 sec each attempt   Ambulation/Elevation   Gait pattern Not appropriate   Balance   Static Sitting Poor +   Dynamic Sitting Poor +   Static Standing Poor -   Endurance Deficit   Endurance Deficit Yes   Endurance Deficit Description fatigue, weakness   Activity Tolerance   Activity Tolerance Patient limited by fatigue   Nurse Made Aware yes-assisted with transfers   Exercises   Balance training  sitting EOB x15 min with close S/minAx1 for posterior drifting   Assessment   Prognosis Fair   Problem List Decreased strength;Decreased endurance; Impaired balance;Decreased mobility; Impaired judgement;Decreased cognition;Decreased safety awareness;Decreased coordination;Pain;Obesity   Assessment Pt agreeable to participate in PT session. Pt performed functional mobility as outlined above. Forward flexed with static standing, VC for upright posture. 3rd STS attempt best attempt with external cue for upright posture. Posterior drift while sitting EOB and eating lunch. VC to address same. Pt left supine in bed with bed alarm, call bell, phone, and all personal needs within reach. Pt will continue to benefit from skilled acute care PT to further address their functional mobility limitations. Based on pt presentation and PT clinical judgement, believe pt would best benefit from STR. Barriers to Discharge Decreased caregiver support; Inaccessible home environment   Goals   Patient Goals to go home   STG Expiration Date 12/03/23   PT Treatment Day 1   Plan   Treatment/Interventions Functional transfer training;LE strengthening/ROM; Therapeutic exercise; Endurance training;Cognitive reorientation;Patient/family training;Equipment eval/education; Bed mobility;Spoke to nursing;Spoke to case management;OT;Gait training   Progress Progressing toward goals   PT Frequency 3-5x/wk   Discharge Recommendation   Rehab Resource Intensity Level, PT II (Moderate Resource Intensity)   AM-PAC Basic Mobility Inpatient   Turning in Flat Bed Without Bedrails 2   Lying on Back to Sitting on Edge of Flat Bed Without Bedrails 1   Moving Bed to Chair 1   Standing Up From Chair Using Arms 1   Walk in Room 1   Climb 3-5 Stairs With Railing 1   Basic Mobility Inpatient Raw Score 7   Turning Head Towards Sound 4   Follow Simple Instructions 3   Low Function Basic Mobility Raw Score  14   Low Function Basic Mobility Standardized Score  22.01   Highest Level Of Mobility   JH-HLM Goal 2: Bed activities/Dependent transfer   JH-HLM Achieved 5: Stand (1 or more minutes)   Evelina Arteaga, PT, DPT

## 2023-11-21 NOTE — PLAN OF CARE
Problem: PAIN - ADULT  Goal: Verbalizes/displays adequate comfort level or baseline comfort level  Description: Interventions:  - Encourage patient to monitor pain and request assistance  - Assess pain using appropriate pain scale  - Administer analgesics based on type and severity of pain and evaluate response  - Implement non-pharmacological measures as appropriate and evaluate response  - Consider cultural and social influences on pain and pain management  - Notify physician/advanced practitioner if interventions unsuccessful or patient reports new pain  Outcome: Progressing     Problem: SAFETY ADULT  Goal: Patient will remain free of falls  Description: INTERVENTIONS:  - Educate patient/family on patient safety including physical limitations  - Instruct patient to call for assistance with activity   - Consult OT/PT to assist with strengthening/mobility   - Keep Call bell within reach  - Keep bed low and locked with side rails adjusted as appropriate  - Keep care items and personal belongings within reach  - Initiate and maintain comfort rounds  - Make Fall Risk Sign visible to staff  - Offer Toileting every 2 Hours, in advance of need  - Initiate/Maintain bed alarm  - Obtain necessary fall risk management equipment:   - Apply yellow socks and bracelet for high fall risk patients  - Consider moving patient to room near nurses station  Outcome: Progressing     Problem: Knowledge Deficit  Goal: Patient/family/caregiver demonstrates understanding of disease process, treatment plan, medications, and discharge instructions  Description: Complete learning assessment and assess knowledge base.   Interventions:  - Provide teaching at level of understanding  - Provide teaching via preferred learning methods  Outcome: Progressing     Problem: NEUROSENSORY - ADULT  Goal: Achieves stable or improved neurological status  Description: INTERVENTIONS  - Monitor and report changes in neurological status  - Monitor vital signs such as temperature, blood pressure, glucose, and any other labs ordered   - Initiate measures to prevent increased intracranial pressure  - Monitor for seizure activity and implement precautions if appropriate      Outcome: Progressing  Goal: Remains free of injury related to seizures activity  Description: INTERVENTIONS  - Maintain airway, patient safety  and administer oxygen as ordered  - Monitor patient for seizure activity, document and report duration and description of seizure to physician/advanced practitioner  - If seizure occurs,  ensure patient safety during seizure  - Reorient patient post seizure  - Seizure pads on all 4 side rails  - Instruct patient/family to notify RN of any seizure activity including if an aura is experienced  - Instruct patient/family to call for assistance with activity based on nursing assessment  - Administer anti-seizure medications if ordered    Outcome: Progressing  Goal: Achieves maximal functionality and self care  Description: INTERVENTIONS  - Monitor swallowing and airway patency with patient fatigue and changes in neurological status  - Encourage and assist patient to increase activity and self care.    - Encourage visually impaired, hearing impaired and aphasic patients to use assistive/communication devices  Outcome: Progressing     Problem: SKIN/TISSUE INTEGRITY - ADULT  Goal: Incision(s), wounds(s) or drain site(s) healing without S/S of infection  Description: INTERVENTIONS  - Assess and document dressing, incision, wound bed, drain sites and surrounding tissue  - Provide patient and family education  - Perform skin care/dressing changes every   Outcome: Progressing  Goal: Pressure injury heals and does not worsen  Description: Interventions:  - Implement low air loss mattress or specialty surface (Criteria met)  - Apply silicone foam dressing  - Instruct/assist with weight shifting every  minutes when in chair     - Consider nutrition services referral as needed  Outcome: Progressing     Problem: MUSCULOSKELETAL - ADULT  Goal: Maintain or return mobility to safest level of function  Description: INTERVENTIONS:  - Assess patient's ability to carry out ADLs; assess patient's baseline for ADL function and identify physical deficits which impact ability to perform ADLs (bathing, care of mouth/teeth, toileting, grooming, dressing, etc.)  - Assess/evaluate cause of self-care deficits   - Assess range of motion  - Assess patient's mobility  - Assess patient's need for assistive devices and provide as appropriate  - Encourage maximum independence but intervene and supervise when necessary  - Involve family in performance of ADLs  - Assess for home care needs following discharge   - Consider OT consult to assist with ADL evaluation and planning for discharge  - Provide patient education as appropriate  Outcome: Progressing     Problem: Nutrition/Hydration-ADULT  Goal: Nutrient/Hydration intake appropriate for improving, restoring or maintaining nutritional needs  Description: Monitor and assess patient's nutrition/hydration status for malnutrition. Collaborate with interdisciplinary team and initiate plan and interventions as ordered. Monitor patient's weight and dietary intake as ordered or per policy. Utilize nutrition screening tool and intervene as necessary. Determine patient's food preferences and provide high-protein, high-caloric foods as appropriate.      INTERVENTIONS:  - Monitor oral intake, urinary output, labs, and treatment plans  - Assess nutrition and hydration status and recommend course of action  - Evaluate amount of meals eaten  - Assist patient with eating if necessary   - Allow adequate time for meals  - Recommend/ encourage appropriate diets, oral nutritional supplements, and vitamin/mineral supplements  - Order, calculate, and assess calorie counts as needed  - Recommend, monitor, and adjust tube feedings and TPN/PPN based on assessed needs  - Assess need for intravenous fluids  - Provide specific nutrition/hydration education as appropriate  - Include patient/family/caregiver in decisions related to nutrition  Outcome: Progressing     Problem: Prexisting or High Potential for Compromised Skin Integrity  Goal: Skin integrity is maintained or improved  Description: INTERVENTIONS:  - Identify patients at risk for skin breakdown  - Assess and monitor skin integrity  - Assess and monitor nutrition and hydration status  - Monitor labs   - Assess for incontinence   - Turn and reposition patient  - Assist with mobility/ambulation  - Relieve pressure over bony prominences  - Avoid friction and shearing  - Provide appropriate hygiene as needed including keeping skin clean and dry  - Evaluate need for skin moisturizer/barrier cream  - Collaborate with interdisciplinary team   - Patient/family teaching  - Consider wound care consult   Outcome: Progressing

## 2023-11-21 NOTE — QUICK NOTE
ECHO 11/20/23: Left ventricular cavity size is normal. Wall thickness is normal. The left ventricular ejection fraction is 35%. Systolic function is moderately reduced. There is global hypokinesis with regional variation. There is akinesis of all the apical segments. Plan:  Continue DAPT for 21 days, follow with ASA monotherapy from 12/9/23  Given ECHO findings, if Cardiology would like to start [facility], then one AP can be stopped. Patient does not need to be on triple therapy. Optimize stroke risk factors  Continue PT/OT      Nupurfranny Martinez will need follow up in 6 weeks with neurovascular and memory attending/AP . She might require MOCA testing outpatient. Defer work-up to outpatient provider. Thank you for letting us take care of this patient.

## 2023-11-21 NOTE — ASSESSMENT & PLAN NOTE
Lab Results   Component Value Date    EGFR 24 11/21/2023    EGFR 28 11/20/2023    EGFR 30 11/19/2023    CREATININE 1.80 (H) 11/21/2023    CREATININE 1.61 (H) 11/20/2023    CREATININE 1.52 (H) 11/19/2023   Monitor renal function  Slightly incrased today but within baseline in setting of ckd  Increase torsemide to 40mg evening dose due to lower extremity edema, crackles on exam

## 2023-11-22 LAB
ANION GAP SERPL CALCULATED.3IONS-SCNC: 12 MMOL/L
BASOPHILS # BLD AUTO: 0.05 THOUSANDS/ÂΜL (ref 0–0.1)
BASOPHILS NFR BLD AUTO: 1 % (ref 0–1)
BUN SERPL-MCNC: 56 MG/DL (ref 5–25)
CALCIUM SERPL-MCNC: 8.8 MG/DL (ref 8.4–10.2)
CHLORIDE SERPL-SCNC: 96 MMOL/L (ref 96–108)
CO2 SERPL-SCNC: 31 MMOL/L (ref 21–32)
CREAT SERPL-MCNC: 1.66 MG/DL (ref 0.6–1.3)
EOSINOPHIL # BLD AUTO: 0.03 THOUSAND/ÂΜL (ref 0–0.61)
EOSINOPHIL NFR BLD AUTO: 0 % (ref 0–6)
ERYTHROCYTE [DISTWIDTH] IN BLOOD BY AUTOMATED COUNT: 15.8 % (ref 11.6–15.1)
GFR SERPL CREATININE-BSD FRML MDRD: 27 ML/MIN/1.73SQ M
GLUCOSE SERPL-MCNC: 141 MG/DL (ref 65–140)
GLUCOSE SERPL-MCNC: 189 MG/DL (ref 65–140)
GLUCOSE SERPL-MCNC: 216 MG/DL (ref 65–140)
GLUCOSE SERPL-MCNC: 235 MG/DL (ref 65–140)
GLUCOSE SERPL-MCNC: 323 MG/DL (ref 65–140)
HCT VFR BLD AUTO: 34.1 % (ref 34.8–46.1)
HGB BLD-MCNC: 11 G/DL (ref 11.5–15.4)
IMM GRANULOCYTES # BLD AUTO: 0.08 THOUSAND/UL (ref 0–0.2)
IMM GRANULOCYTES NFR BLD AUTO: 1 % (ref 0–2)
LYMPHOCYTES # BLD AUTO: 2.14 THOUSANDS/ÂΜL (ref 0.6–4.47)
LYMPHOCYTES NFR BLD AUTO: 31 % (ref 14–44)
MCH RBC QN AUTO: 35.3 PG (ref 26.8–34.3)
MCHC RBC AUTO-ENTMCNC: 32.3 G/DL (ref 31.4–37.4)
MCV RBC AUTO: 109 FL (ref 82–98)
MONOCYTES # BLD AUTO: 0.35 THOUSAND/ÂΜL (ref 0.17–1.22)
MONOCYTES NFR BLD AUTO: 5 % (ref 4–12)
NEUTROPHILS # BLD AUTO: 4.24 THOUSANDS/ÂΜL (ref 1.85–7.62)
NEUTS SEG NFR BLD AUTO: 62 % (ref 43–75)
NRBC BLD AUTO-RTO: 1 /100 WBCS
PLATELET # BLD AUTO: 145 THOUSANDS/UL (ref 149–390)
PMV BLD AUTO: 10.1 FL (ref 8.9–12.7)
POTASSIUM SERPL-SCNC: 4.1 MMOL/L (ref 3.5–5.3)
RBC # BLD AUTO: 3.12 MILLION/UL (ref 3.81–5.12)
SODIUM SERPL-SCNC: 139 MMOL/L (ref 135–147)
WBC # BLD AUTO: 6.89 THOUSAND/UL (ref 4.31–10.16)

## 2023-11-22 PROCEDURE — 99232 SBSQ HOSP IP/OBS MODERATE 35: CPT | Performed by: INTERNAL MEDICINE

## 2023-11-22 PROCEDURE — 80048 BASIC METABOLIC PNL TOTAL CA: CPT | Performed by: INTERNAL MEDICINE

## 2023-11-22 PROCEDURE — 82948 REAGENT STRIP/BLOOD GLUCOSE: CPT

## 2023-11-22 PROCEDURE — 85025 COMPLETE CBC W/AUTO DIFF WBC: CPT | Performed by: INTERNAL MEDICINE

## 2023-11-22 RX ORDER — CYANOCOBALAMIN 1000 UG/ML
1000 INJECTION, SOLUTION INTRAMUSCULAR; SUBCUTANEOUS ONCE
Status: COMPLETED | OUTPATIENT
Start: 2023-11-22 | End: 2023-11-22

## 2023-11-22 RX ORDER — TORSEMIDE 20 MG/1
40 TABLET ORAL DAILY
Status: DISCONTINUED | OUTPATIENT
Start: 2023-11-23 | End: 2023-11-23

## 2023-11-22 RX ORDER — TORSEMIDE 20 MG/1
40 TABLET ORAL 2 TIMES DAILY
Status: DISCONTINUED | OUTPATIENT
Start: 2023-11-23 | End: 2023-11-22

## 2023-11-22 RX ORDER — TORSEMIDE 20 MG/1
20 TABLET ORAL DAILY
Status: DISCONTINUED | OUTPATIENT
Start: 2023-11-23 | End: 2023-11-23

## 2023-11-22 RX ORDER — TORSEMIDE 20 MG/1
20 TABLET ORAL ONCE
Status: COMPLETED | OUTPATIENT
Start: 2023-11-22 | End: 2023-11-22

## 2023-11-22 RX ADMIN — Medication 2 G: at 11:55

## 2023-11-22 RX ADMIN — PRAVASTATIN SODIUM 10 MG: 10 TABLET ORAL at 17:40

## 2023-11-22 RX ADMIN — TORSEMIDE 20 MG: 20 TABLET ORAL at 10:38

## 2023-11-22 RX ADMIN — GABAPENTIN 300 MG: 300 CAPSULE ORAL at 20:41

## 2023-11-22 RX ADMIN — Medication 2 G: at 21:49

## 2023-11-22 RX ADMIN — INSULIN LISPRO 2 UNITS: 100 INJECTION, SOLUTION INTRAVENOUS; SUBCUTANEOUS at 12:38

## 2023-11-22 RX ADMIN — SENNOSIDES 8.6 MG: 8.6 TABLET, FILM COATED ORAL at 10:01

## 2023-11-22 RX ADMIN — HEPARIN SODIUM 5000 UNITS: 5000 INJECTION INTRAVENOUS; SUBCUTANEOUS at 06:20

## 2023-11-22 RX ADMIN — Medication 2 G: at 17:31

## 2023-11-22 RX ADMIN — INSULIN LISPRO 2 UNITS: 100 INJECTION, SOLUTION INTRAVENOUS; SUBCUTANEOUS at 17:40

## 2023-11-22 RX ADMIN — INSULIN GLARGINE 5 UNITS: 100 INJECTION, SOLUTION SUBCUTANEOUS at 21:49

## 2023-11-22 RX ADMIN — CYANOCOBALAMIN 1000 MCG: 1000 INJECTION, SOLUTION INTRAMUSCULAR; SUBCUTANEOUS at 14:15

## 2023-11-22 RX ADMIN — INSULIN LISPRO 1 UNITS: 100 INJECTION, SOLUTION INTRAVENOUS; SUBCUTANEOUS at 01:49

## 2023-11-22 RX ADMIN — LEVOTHYROXINE SODIUM 100 MCG: 100 TABLET ORAL at 06:22

## 2023-11-22 RX ADMIN — TORSEMIDE 20 MG: 20 TABLET ORAL at 21:48

## 2023-11-22 RX ADMIN — COLCHICINE 0.6 MG: 0.6 TABLET ORAL at 10:01

## 2023-11-22 RX ADMIN — SERTRALINE 25 MG: 25 TABLET, FILM COATED ORAL at 10:01

## 2023-11-22 RX ADMIN — TORSEMIDE 20 MG: 20 TABLET ORAL at 17:40

## 2023-11-22 RX ADMIN — PREDNISONE 5 MG: 5 TABLET ORAL at 10:01

## 2023-11-22 RX ADMIN — Medication 2 G: at 10:02

## 2023-11-22 RX ADMIN — ASPIRIN 81 MG CHEWABLE TABLET 81 MG: 81 TABLET CHEWABLE at 09:59

## 2023-11-22 RX ADMIN — ISOSORBIDE MONONITRATE 30 MG: 30 TABLET, EXTENDED RELEASE ORAL at 10:00

## 2023-11-22 RX ADMIN — INSULIN LISPRO 3 UNITS: 100 INJECTION, SOLUTION INTRAVENOUS; SUBCUTANEOUS at 21:49

## 2023-11-22 RX ADMIN — ALLOPURINOL 150 MG: 300 TABLET ORAL at 10:26

## 2023-11-22 RX ADMIN — CYANOCOBALAMIN TAB 500 MCG 1000 MCG: 500 TAB at 10:38

## 2023-11-22 RX ADMIN — HEPARIN SODIUM 5000 UNITS: 5000 INJECTION INTRAVENOUS; SUBCUTANEOUS at 14:15

## 2023-11-22 RX ADMIN — HYDROCODONE BITARTRATE AND ACETAMINOPHEN 1 TABLET: 5; 325 TABLET ORAL at 06:20

## 2023-11-22 RX ADMIN — HEPARIN SODIUM 5000 UNITS: 5000 INJECTION INTRAVENOUS; SUBCUTANEOUS at 21:49

## 2023-11-22 RX ADMIN — HYDROCODONE BITARTRATE AND ACETAMINOPHEN 1 TABLET: 5; 325 TABLET ORAL at 20:41

## 2023-11-22 RX ADMIN — HYDROCODONE BITARTRATE AND ACETAMINOPHEN 1 TABLET: 5; 325 TABLET ORAL at 12:41

## 2023-11-22 RX ADMIN — METOPROLOL TARTRATE 25 MG: 25 TABLET, FILM COATED ORAL at 10:00

## 2023-11-22 RX ADMIN — METOPROLOL TARTRATE 25 MG: 25 TABLET, FILM COATED ORAL at 21:49

## 2023-11-22 RX ADMIN — GABAPENTIN 300 MG: 300 CAPSULE ORAL at 09:59

## 2023-11-22 RX ADMIN — CLOPIDOGREL BISULFATE 75 MG: 75 TABLET ORAL at 10:00

## 2023-11-22 RX ADMIN — METHOCARBAMOL 750 MG: 750 TABLET ORAL at 20:41

## 2023-11-22 NOTE — CASE MANAGEMENT
Case Management Discharge Planning Note    Patient name Carlie Lanes  Location [address] [place] [address] 710/[facility] 710-01 MRN [id number]  : 1934 Date 2023       Current Admission Date: 2023  Current Admission Diagnosis:Stroke-like symptoms   Patient Active Problem List    Diagnosis Date Noted    Stroke-like symptoms 2023    Goals of care, counseling/discussion 2023    Fall 2023    Pressure injury of deep tissue 2023    Pancreatic lesion 2023    Rheumatoid arthritis ([address]) 2023    DM2 (diabetes mellitus, type 2) ([address]) 2023    History of renal artery stenosis 2023    Family history of gout 2023    Peripheral polyneuropathy 2023    Hypertension 2023    Lupus ([address]) 2023    Hyperlipidemia 2023    History of stroke 2023    Hemorrhoids 2023    Type 2 diabetes mellitus with diabetic neuropathy, without long-term current use of insulin ([address]) 02/15/2023    Primary hypertension 02/15/2023    Class 1 obesity due to excess calories without serious comorbidity with body mass index (BMI) of 33.0 to 33.9 in adult 02/15/2023    Thoracic spine fracture ([address]) 2023    Perineal hematoma 2023    Cervical spine fracture ([address]) 02/10/2023    Contusion 02/10/2023    CHF (congestive heart failure) ([address]) 02/10/2023    CKD (chronic kidney disease) stage 4, GFR 15-29 ml/min ([address]) 02/10/2023    Hypothyroidism 02/10/2023    Fall 2023    Ambulatory dysfunction 2022    Chronic midline low back pain without sciatica 2022    COVID-19 2022    Fall 2022    Fall 2022    Hypothyroidism 2022    DM type 2 (diabetes mellitus, type 2) ([address]) 2022    AZUL (obstructive sleep apnea) 2022    Essential hypertension 2022    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 ([address]) 2022    Renal artery stenosis ([address]) 2022    Lab test positive for detection of COVID-19 virus 2022    Recurrent UTI 09/28/2021    Multiple drug resistant organism (MDRO) culture positive 09/28/2021    HTN (hypertension), benign 12/14/2020    Chronic gout without tophus 34/91/9916    Toxic metabolic encephalopathy 38/48/3823    Aspiration pneumonia due to regurgitated food ([address]) 09/29/2020    Macrocytic anemia 09/29/2020    Nephrosclerosis arteriolar, stage 1-4 or unspecified chronic kidney disease 05/29/2020    Encounter for follow-up examination after completed treatment for malignant neoplasm 05/19/2020    Seasonal allergic rhinitis due to pollen 03/24/2020    Wheezing 11/04/2019    Stage 4 chronic kidney disease ([address]) 10/29/2019    Alkalosis 10/29/2019    Chronic respiratory failure with hypoxia ([address]) 10/21/2019    Chronic anemia 10/19/2019    SDH (subdural hematoma) ([address]) 10/19/2019    AZUL (obstructive sleep apnea) 10/19/2019    H/O spinal fusion 07/23/2019    Hyponatremia 01/21/2019    Chronic combined systolic and diastolic heart failure ([address]) 01/17/2019    Depression 01/17/2019    Essential hypertension 01/09/2019    Controlled type 2 diabetes mellitus with diabetic neuropathy, without long-term current use of insulin ([address]) 10/24/2017    SLE (systemic lupus erythematosus) ([address]) 10/24/2017    Vitamin D deficiency 08/22/2017    Renal artery stenosis ([address]) 02/08/2017    Peripheral polyneuropathy 02/07/2017    Hypothyroidism 09/23/2016    H/O: CVA (cerebrovascular accident) 09/23/2016    History of malignant neoplasm of breast 09/23/2016    History of aortic valve replacement 07/21/2015    RA (rheumatoid arthritis) ([address]) 07/21/2015    Hyperlipidemia 10/01/2013    Osteoporosis 10/01/2013    Obesity (BMI 30.0-34.9) 10/12/2012      LOS (days): 4  Geometric Mean LOS (GMLOS) (days): 2.90  Days to GMLOS:-0.9     OBJECTIVE:  Risk of Unplanned Readmission Score: 47.08         Current admission status: Inpatient   Preferred Pharmacy:   [facility]/pharmacy #[id number] AZIZA Salazar - [address]  [address]  [place] [address]  Phone: [phone] Fax: [phone] [address] [place] [address], [address] [facility] - [address] [address]  [address]  [place] [address] [facility] [address]  Phone: [phone] Fax: Elpidio Cheatham, [address] [place] [address] [address] [facility]  [address] [facility]  [address] [facility]  [address] [address] [facility] [address] [address]  Phone: [phone] Fax: [phone]    PATIENT/FAMILY REPORTS NO PREFERRED PHARMACY  No address on file      Primary Care Provider: Sam Beranrd DO    Primary Insurance: Elsy Loera [facility]  Secondary Insurance:     DISCHARGE DETAILS:    Discharge planning discussed with[de-identified] [place]ard [place] of Choice: Yes  Comments - Freedom of Choice: [facility]  CM contacted family/caregiver?: Yes  Were Treatment Team discharge recommendations reviewed with patient/caregiver?: Yes  Did patient/caregiver verbalize understanding of patient care needs?: Yes       Contacts  Patient Contacts: Teri Silver  Relationship to Patient[de-identified] Family  Contact Method: Phone  Phone Number: [phone]  Reason/Outcome: Discharge Planning       Other Referral/Resources/Interventions Provided:  Referral Comments: Family and pt in agreement to SNF facilities. [facility] referral sent in [facility]. Pending availability and Auth.

## 2023-11-22 NOTE — ARC ADMISSION
Referral received for consideration of patient for inpatient acute rehab. Will review patient's case with [facility] physician and update CM as able. Reviewed patient's case with [facility] physician - patient is recommended for SNF/subacute rehab for longer, slower paced therapy needs at this time. CM has been updated.

## 2023-11-22 NOTE — ASSESSMENT & PLAN NOTE
Wt Readings from Last 3 Encounters:   11/20/23 73.9 kg (163 lb)   11/19/23 73.9 kg (163 lb)   09/05/23 72.6 kg (160 lb)       Continue with Plavix, Imdur, Lopressor and torsemide 20 mg am and 40mg pm  Patient followed by palliative care as an outpatient with [place] care at [facility]

## 2023-11-22 NOTE — NURSING NOTE
Daughter requested that pt go on commode to have BM. Me and pca attempted together however pt was not able to stand alone and put weight on legs so we stopped and gave her the bedpan. Too unsafe to put on commode after attempt and didn't want pt to fall. Pt given bedpan.

## 2023-11-22 NOTE — PROGRESS NOTES
[address] [facility]  Progress Note  Name: Yuly Arthur  MRN: [id number]  Unit/Bed#: [facility] 710-01 I Date of Admission: 11/18/2023   Date of Service: 11/22/2023 I Hospital Day: 4    Assessment/Plan   Goals of care, counseling/discussion  Assessment & Plan  Of note, patient is followed by [facility] palliative care. Note patient's outpatient notes. Confirmed with granddaughter that she is a level 3 DNR. Deepti Mccain She has been followed by [facility] palliative care due to history of heart failure. We will continue with aggressive medical management and work-up for stroke. Family is agreeable to stroke pathway work-up and recommendations. Patient presents with left-sided weakness which is gradually improving on presentation to the ED.     Hyperlipidemia  Assessment & Plan  Patient reportedly intolerant to statins, but in review of her recent meds she does not appear to have trialed too many low intensity statins, discussed with patient and family at bedside and will try lowest dose pravastatin at 10 mg daily at this time    DM2 (diabetes mellitus, type 2) ([facility])  Assessment & Plan    Lab Results   Component Value Date    HGBA1C 7.0 (H) 11/19/2023   Sliding-scale insulin    Rheumatoid arthritis ([address])  Assessment & Plan  Continue prednisone 5 mg daily    CHF (congestive heart failure) ([address])  Assessment & Plan  Wt Readings from Last 3 Encounters:   11/20/23 73.9 kg (163 lb)   11/19/23 73.9 kg (163 lb)   09/05/23 72.6 kg (160 lb)       Continue with Plavix, Imdur, Lopressor and torsemide 20 mg am and 40mg pm  Patient followed by palliative care as an outpatient with [facility] care at [address] [employer] [address] chronic kidney disease [facility])  Assessment & Plan  Lab Results   Component Value Date    EGFR 27 11/22/2023    EGFR 24 11/21/2023    EGFR 28 11/20/2023    CREATININE 1.66 (H) 11/22/2023    CREATININE 1.80 (H) 11/21/2023    CREATININE 1.61 (H) 11/20/2023   Monitor renal function  Slightly incrased today but within baseline in setting of ckd  Increase torsemide to 40mg evening dose due to lower extremity edema, crackles on exam  Cr and volume status improving on torsemide 20 and 40 mg in the p.m. SLE (systemic lupus erythematosus) ([address])  Assessment & Plan  Currently on prednisone. Daughter confirms that she is taking 5 mg daily    Hypothyroidism  Assessment & Plan  Levothyroxine 100 daily. Patient previously was taking 112 which was changed to 100. We will continue with levothyroxine at 100 mcg daily    * Stroke-like symptoms  Assessment & Plan  Patient had prehospital stroke alert initiated. Admitted under stroke pathway  MRI with "Acute lacunar infarct in the right centrum semiovale."  Patient with improving strength in RUE, but continues to have some weakness  Continue stroke pathway  Appreciate neuro recommendations  Awaiting echocardiogram  PT/ OT      Lethargy-resolved as of 11/20/2023  Assessment & Plan  MRI pending  Patient started on Rocephin empirically for possible urinary tract infection. Follow-up on final cultures  Continue with antibiotic therapy  Continue with stroke pathway work-up as above. PT/OT             VTE Pharmacologic Prophylaxis:   Moderate Risk (Score 3-4) - Pharmacological DVT Prophylaxis Ordered: heparin. Mobility:   Basic Mobility Inpatient Raw Score: 7  -[facility] Goal: 2: Bed activities/Dependent transfer  -[facility] Achieved: 1: Laying in bed  [facility] Goal achieved. Continue to encourage appropriate mobility. Patient Centered Rounds: I performed bedside rounds with nursing staff today. Discussions with Specialists or Other Care Team Provider: none    Education and Discussions with Family / Patient: Updated  (daughter) at bedside. Total Time Spent on Date of Encounter in care of patient: 35 mins.  This time was spent on one or more of the following: performing physical exam; counseling and coordination of care; obtaining or reviewing history; documenting in the medical record; reviewing/ordering tests, medications or procedures; communicating with other healthcare professionals and discussing with patient's family/caregivers. Current Length of Stay: 4 day(s)  Current Patient Status: Inpatient   Certification Statement: The patient will continue to require additional inpatient hospital stay due to pending placement to SNF  Discharge Plan: Anticipate discharge in 24-48 hrs to rehab facility. Code Status: Level 3 - DNAR and DNI    Subjective:   Patient denies any acute complaints    Objective:     Vitals:   Temp (24hrs), Av °F (36.7 °C), Min:97.7 °F (36.5 °C), Max:98.3 °F (36.8 °C)    Temp:  [97.7 °F (36.5 °C)-98.3 °F (36.8 °C)] 98.2 °F (36.8 °C)  HR:  [61-73] 61  Resp:  [16-19] 19  BP: (119-152)/(48-69) 133/48  SpO2:  [92 %-95 %] 94 %  Body mass index is 32.92 kg/m². Input and Output Summary (last 24 hours): Intake/Output Summary (Last 24 hours) at 2023 1850  Last data filed at 2023 1833  Gross per 24 hour   Intake 180 ml   Output --   Net 180 ml       Physical Exam:   Physical Exam  Vitals reviewed. Constitutional:       General: She is not in acute distress. Appearance: She is obese. She is not ill-appearing. HENT:      Head: Normocephalic. Mouth/Throat:      Mouth: Mucous membranes are moist.   Eyes:      General: No scleral icterus. Extraocular Movements: Extraocular movements intact. Cardiovascular:      Rate and Rhythm: Normal rate and regular rhythm. Pulses: Normal pulses. Heart sounds: No murmur heard. No friction rub. No gallop. Pulmonary:      Effort: Pulmonary effort is normal. No respiratory distress. Breath sounds: No wheezing, rhonchi or rales. Abdominal:      General: Abdomen is flat. Bowel sounds are normal. There is no distension. Palpations: Abdomen is soft. Tenderness: There is no abdominal tenderness. There is no guarding or rebound.    Musculoskeletal:      Right lower leg: No edema. Left lower leg: No edema. Skin:     General: Skin is warm. Capillary Refill: Capillary refill takes less than 2 seconds. Coloration: Skin is not jaundiced. Findings: No rash. Neurological:      General: No focal deficit present. Mental Status: She is alert and oriented to person, place, and time. Sensory: No sensory deficit. Motor: Weakness present. Comments: LLE 2/5 strength   Psychiatric:         Mood and Affect: Mood normal.         Behavior: Behavior normal.          Additional Data:     Labs:  Results from last 7 days   Lab Units 11/22/23  0830   WBC Thousand/uL 6.89   HEMOGLOBIN g/dL 11.0*   HEMATOCRIT % 34.1*   PLATELETS Thousands/uL 145*   NEUTROS PCT % 62   LYMPHS PCT % 31   MONOS PCT % 5   EOS PCT % 0     Results from last 7 days   Lab Units 11/22/23  0830 11/20/23  0513 11/19/23  0844   SODIUM mmol/L 139   < > 138   POTASSIUM mmol/L 4.1   < > 3.9   CHLORIDE mmol/L 96   < > 93*   CO2 mmol/L 31   < > 33*   BUN mg/dL 56*   < > 58*   CREATININE mg/dL 1.66*   < > 1.52*   ANION GAP mmol/L 12   < > 12   CALCIUM mg/dL 8.8   < > 9.1   ALBUMIN g/dL  --   --  3.7   TOTAL BILIRUBIN mg/dL  --   --  0.38   ALK PHOS U/L  --   --  64   ALT U/L  --   --  17   AST U/L  --   --  20   GLUCOSE RANDOM mg/dL 189*   < > 199*    < > = values in this interval not displayed.      Results from last 7 days   Lab Units 11/18/23  1611   INR  0.94     Results from last 7 days   Lab Units 11/22/23  1606 11/22/23  1113 11/22/23  0626 11/21/23  2135 11/21/23  1602 11/21/23  1130 11/21/23  0639 11/21/23  0251 11/20/23  2119 11/20/23  1624 11/20/23  1555 11/20/23  1050   POC GLUCOSE mg/dl 235* 216* 141* 185* 165* 300* 108 118 136 291* 300* 144*     Results from last 7 days   Lab Units 11/19/23  0844   HEMOGLOBIN A1C % 7.0*     Results from last 7 days   Lab Units 11/18/23  1520   PROCALCITONIN ng/ml 0.10       Lines/Drains:  Invasive Devices       Peripheral Intravenous Line  Duration Peripheral IV 11/22/23 Dorsal (posterior); Left Forearm <1 day    Peripheral IV 11/22/23 Left;Ventral (anterior) Forearm <1 day              Drain  Duration             External Urinary Catheter 551 days                          Imaging: No pertinent imaging reviewed. Recent Cultures (last 7 days):   Results from last 7 days   Lab Units 11/18/23  1805 11/18/23  1612   BLOOD CULTURE  No Growth at 72 hrs.   No Growth at 72 hrs.  --    URINE CULTURE   --  >100,000 cfu/ml Aerococcus urinae*  80,000-89,000 cfu/ml Escherichia coli*       Last 24 Hours Medication List:   Current Facility-Administered Medications   Medication Dose Route Frequency Provider Last Rate    albuterol  2 puff Inhalation Q4H PRN Hetul Shine, DO      allopurinol  150 mg Oral Daily Hetul Shine, DO      aspirin  81 mg Oral Daily Aun Sanket      calcium carbonate  1,000 mg Oral Daily PRN Hetul Shine, DO      clopidogrel  75 mg Oral Daily Hetul Shine, DO      colchicine  0.6 mg Oral Every Other Day Hetul Shine, DO      vitamin B-12  1,000 mcg Oral Daily Blake Solorio, DO      Diclofenac Sodium  2 g Topical 4x Daily [facility]      gabapentin  300 mg Oral BID Hetul Shine, DO      heparin (porcine)  5,000 Units Subcutaneous Q8H [address] Hetul Shine, DO      HYDROcodone-acetaminophen  1 tablet Oral Q6H PRN [facility]      insulin glargine  5 Units Subcutaneous HS Jovanny Silva      insulin lispro  1-5 Units Subcutaneous TID AC NATHEN Brown      insulin lispro  1-5 Units Subcutaneous HS Orien PlevaLISSETTENP      insulin lispro  1-5 Units Subcutaneous [address] CRNP      isosorbide mononitrate  30 mg Oral Daily Hetul Shine, DO      levothyroxine  100 mcg Oral Early Morning Hetul Shine, DO      methocarbamol  750 mg Oral Q6H PRN Hetul Hsine, DO      metoprolol tartrate  25 mg Oral Q12H [address] Hetul Shine, DO      ondansetron  4 mg Intravenous Q6H PRN Hetul Shine, DO      polyethylene glycol  17 g Oral Daily Hetul Shine, DO pravastatin  10 mg Oral Daily With [address] Silva      predniSONE  5 mg Oral Daily Jomar Reinaa, DO      senna  8.6 mg Oral Daily Jomar Shine, DO      sertraline  25 mg Oral Daily Hetluther Reinaa, DO      [START ON 11/23/2023] torsemide  20 mg Oral Daily Blake Solorio, DO      torsemide  20 mg Oral Once Blakerita Aminai, DO      [START ON 11/23/2023] torsemide  40 mg Oral Daily Blake Solorio DO          Today, Patient Was Seen By: Getachew Llamas DO    **Please Note: This note may have been constructed using a voice recognition system. **

## 2023-11-22 NOTE — ASSESSMENT & PLAN NOTE
Of note, patient is followed by [facility] palliative care. Note patient's outpatient notes. Confirmed with granddaughter that she is a level 3 DNR. Wolf Suazo She has been followed by [place] palliative care due to history of heart failure. We will continue with aggressive medical management and work-up for stroke. Family is agreeable to stroke pathway work-up and recommendations. Patient presents with left-sided weakness which is gradually improving on presentation to the ED.

## 2023-11-22 NOTE — CASE MANAGEMENT
Case Management Discharge Planning Note    Patient name Ariana Villafuerte  Location [address] [place] [address] 710/[facility] 710-01 MRN [id number]  : 1934 Date 2023       Current Admission Date: 2023  Current Admission Diagnosis:Stroke-like symptoms   Patient Active Problem List    Diagnosis Date Noted    Stroke-like symptoms 2023    Goals of care, counseling/discussion 2023    Fall 2023    Pressure injury of deep tissue 2023    Pancreatic lesion 2023    Rheumatoid arthritis ([address]) 2023    DM2 (diabetes mellitus, type 2) ([address]) 2023    History of renal artery stenosis 2023    Family history of gout 2023    Peripheral polyneuropathy 2023    Hypertension 2023    Lupus ([address]) 2023    Hyperlipidemia 2023    History of stroke 2023    Hemorrhoids 2023    Type 2 diabetes mellitus with diabetic neuropathy, without long-term current use of insulin ([address]) 02/15/2023    Primary hypertension 02/15/2023    Class 1 obesity due to excess calories without serious comorbidity with body mass index (BMI) of 33.0 to 33.9 in adult 02/15/2023    Thoracic spine fracture ([address]) 2023    Perineal hematoma 2023    Cervical spine fracture ([address]) 02/10/2023    Contusion 02/10/2023    CHF (congestive heart failure) ([address]) 02/10/2023    CKD (chronic kidney disease) stage 4, GFR 15-29 ml/min ([address]) 02/10/2023    Hypothyroidism 02/10/2023    Fall 2023    Ambulatory dysfunction 2022    Chronic midline low back pain without sciatica 2022    COVID-19 2022    Fall 2022    Fall 2022    Hypothyroidism 2022    DM type 2 (diabetes mellitus, type 2) ([address]) 2022    AZUL (obstructive sleep apnea) 2022    Essential hypertension 2022    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 ([address]) 2022    Renal artery stenosis ([address]) 2022    Lab test positive for detection of COVID-19 virus 2022    Recurrent UTI 09/28/2021    Multiple drug resistant organism (MDRO) culture positive 09/28/2021    HTN (hypertension), benign 12/14/2020    Chronic gout without tophus 53/45/9488    Toxic metabolic encephalopathy 69/52/3083    Aspiration pneumonia due to regurgitated food ([address]) 09/29/2020    Macrocytic anemia 09/29/2020    Nephrosclerosis arteriolar, stage 1-4 or unspecified chronic kidney disease 05/29/2020    Encounter for follow-up examination after completed treatment for malignant neoplasm 05/19/2020    Seasonal allergic rhinitis due to pollen 03/24/2020    Wheezing 11/04/2019    Stage 4 chronic kidney disease ([address]) 10/29/2019    Alkalosis 10/29/2019    Chronic respiratory failure with hypoxia ([address]) 10/21/2019    Chronic anemia 10/19/2019    SDH (subdural hematoma) ([address]) 10/19/2019    AZUL (obstructive sleep apnea) 10/19/2019    H/O spinal fusion 07/23/2019    Hyponatremia 01/21/2019    Chronic combined systolic and diastolic heart failure ([address]) 01/17/2019    Depression 01/17/2019    Essential hypertension 01/09/2019    Controlled type 2 diabetes mellitus with diabetic neuropathy, without long-term current use of insulin ([address]) 10/24/2017    SLE (systemic lupus erythematosus) ([address]) 10/24/2017    Vitamin D deficiency 08/22/2017    Renal artery stenosis ([address]) 02/08/2017    Peripheral polyneuropathy 02/07/2017    Hypothyroidism 09/23/2016    H/O: CVA (cerebrovascular accident) 09/23/2016    History of malignant neoplasm of breast 09/23/2016    History of aortic valve replacement 07/21/2015    RA (rheumatoid arthritis) ([address]) 07/21/2015    Hyperlipidemia 10/01/2013    Osteoporosis 10/01/2013    Obesity (BMI 30.0-34.9) 10/12/2012      LOS (days): 4  Geometric Mean LOS (GMLOS) (days): 2.90  Days to GMLOS:-0.7     OBJECTIVE:  Risk of Unplanned Readmission Score: 46.38         Current admission status: Inpatient   Preferred Pharmacy:   [facility]/pharmacy #[id number] AZIZA Arreaga - [address]  [address]  [place] [address]  Phone: [phone] Fax: [phone]  [facility], [address] [place] [address],[address]  [address] [facility]  [facility] [address]  Phone: [phone] Fax: Elpidio Cheatham, [address] [facility] [address] [facility]  [address] [facility]  [address] [facility]  [address] [facility] [address] [address]  Phone: [phone] Fax: [phone]    PATIENT/FAMILY REPORTS NO PREFERRED PHARMACY  No address on file      Primary Care Provider: Jeniffer Mchugh DO    Primary Insurance: [facility] REP  Secondary Insurance:     DISCHARGE DETAILS:    Discharge planning discussed with[de-identified] Chino Guadarrama and patient  Freedom of Choice: Yes  Comments - Freedom of Choice: FOC discussed  CM contacted family/caregiver?: Yes  Were Treatment Team discharge recommendations reviewed with patient/caregiver?: Yes  Did patient/caregiver verbalize understanding of patient care needs?: Yes       Other Referral/Resources/Interventions Provided:  Referral Comments: Recs for rehab. Family and patient want acute rehab if possible, requested referrals for [facility] and [facility]. Referrals sent in Aidin.

## 2023-11-22 NOTE — PLAN OF CARE
Problem: PAIN - ADULT  Goal: Verbalizes/displays adequate comfort level or baseline comfort level  Description: Interventions:  - Encourage patient to monitor pain and request assistance  - Assess pain using appropriate pain scale  - Administer analgesics based on type and severity of pain and evaluate response  - Implement non-pharmacological measures as appropriate and evaluate response  - Consider cultural and social influences on pain and pain management  - Notify physician/advanced practitioner if interventions unsuccessful or patient reports new pain  11/22/2023 0801 by Demaris Felty, RN  Outcome: Progressing  11/22/2023 0800 by Demaris Felty, RN  Outcome: Progressing     Problem: SAFETY ADULT  Goal: Patient will remain free of falls  Description: INTERVENTIONS:  - Educate patient/family on patient safety including physical limitations  - Instruct patient to call for assistance with activity   - Consult OT/PT to assist with strengthening/mobility   - Keep Call bell within reach  - Keep bed low and locked with side rails adjusted as appropriate  - Keep care items and personal belongings within reach  - Initiate and maintain comfort rounds  - Make Fall Risk Sign visible to staff  - Offer Toileting every  Hours, in advance of need  - Initiate/Maintain bed/chair alarm  - Obtain necessary fall risk management equipment:   - Apply yellow socks and bracelet for high fall risk patients  - Consider moving patient to room near nurses station  Outcome: Progressing

## 2023-11-22 NOTE — ASSESSMENT & PLAN NOTE
Lab Results   Component Value Date    EGFR 27 11/22/2023    EGFR 24 11/21/2023    EGFR 28 11/20/2023    CREATININE 1.66 (H) 11/22/2023    CREATININE 1.80 (H) 11/21/2023    CREATININE 1.61 (H) 11/20/2023   Monitor renal function  Slightly incrased today but within baseline in setting of ckd  Increase torsemide to 40mg evening dose due to lower extremity edema, crackles on exam  Cr and volume status improving on torsemide 20 and 40 mg in the p.m.

## 2023-11-23 LAB
ANION GAP SERPL CALCULATED.3IONS-SCNC: 11 MMOL/L
BASOPHILS # BLD AUTO: 0.05 THOUSANDS/ÂΜL (ref 0–0.1)
BASOPHILS NFR BLD AUTO: 1 % (ref 0–1)
BUN SERPL-MCNC: 52 MG/DL (ref 5–25)
CALCIUM SERPL-MCNC: 8.6 MG/DL (ref 8.4–10.2)
CHLORIDE SERPL-SCNC: 97 MMOL/L (ref 96–108)
CO2 SERPL-SCNC: 32 MMOL/L (ref 21–32)
CREAT SERPL-MCNC: 1.35 MG/DL (ref 0.6–1.3)
EOSINOPHIL # BLD AUTO: 0.04 THOUSAND/ÂΜL (ref 0–0.61)
EOSINOPHIL NFR BLD AUTO: 1 % (ref 0–6)
ERYTHROCYTE [DISTWIDTH] IN BLOOD BY AUTOMATED COUNT: 15.6 % (ref 11.6–15.1)
GFR SERPL CREATININE-BSD FRML MDRD: 34 ML/MIN/1.73SQ M
GLUCOSE SERPL-MCNC: 184 MG/DL (ref 65–140)
GLUCOSE SERPL-MCNC: 297 MG/DL (ref 65–140)
GLUCOSE SERPL-MCNC: 314 MG/DL (ref 65–140)
GLUCOSE SERPL-MCNC: 484 MG/DL (ref 65–140)
GLUCOSE SERPL-MCNC: 69 MG/DL (ref 65–140)
GLUCOSE SERPL-MCNC: 80 MG/DL (ref 65–140)
HCT VFR BLD AUTO: 32.6 % (ref 34.8–46.1)
HGB BLD-MCNC: 10 G/DL (ref 11.5–15.4)
IMM GRANULOCYTES # BLD AUTO: 0.09 THOUSAND/UL (ref 0–0.2)
IMM GRANULOCYTES NFR BLD AUTO: 2 % (ref 0–2)
LYMPHOCYTES # BLD AUTO: 2.01 THOUSANDS/ÂΜL (ref 0.6–4.47)
LYMPHOCYTES NFR BLD AUTO: 34 % (ref 14–44)
MCH RBC QN AUTO: 33.7 PG (ref 26.8–34.3)
MCHC RBC AUTO-ENTMCNC: 30.7 G/DL (ref 31.4–37.4)
MCV RBC AUTO: 110 FL (ref 82–98)
MONOCYTES # BLD AUTO: 0.41 THOUSAND/ÂΜL (ref 0.17–1.22)
MONOCYTES NFR BLD AUTO: 7 % (ref 4–12)
NEUTROPHILS # BLD AUTO: 3.33 THOUSANDS/ÂΜL (ref 1.85–7.62)
NEUTS SEG NFR BLD AUTO: 55 % (ref 43–75)
NRBC BLD AUTO-RTO: 1 /100 WBCS
PLATELET # BLD AUTO: 151 THOUSANDS/UL (ref 149–390)
PMV BLD AUTO: 10 FL (ref 8.9–12.7)
POTASSIUM SERPL-SCNC: 4.2 MMOL/L (ref 3.5–5.3)
RBC # BLD AUTO: 2.97 MILLION/UL (ref 3.81–5.12)
SODIUM SERPL-SCNC: 140 MMOL/L (ref 135–147)
WBC # BLD AUTO: 5.93 THOUSAND/UL (ref 4.31–10.16)

## 2023-11-23 PROCEDURE — 80048 BASIC METABOLIC PNL TOTAL CA: CPT | Performed by: INTERNAL MEDICINE

## 2023-11-23 PROCEDURE — 85025 COMPLETE CBC W/AUTO DIFF WBC: CPT | Performed by: INTERNAL MEDICINE

## 2023-11-23 PROCEDURE — 82948 REAGENT STRIP/BLOOD GLUCOSE: CPT

## 2023-11-23 PROCEDURE — 99232 SBSQ HOSP IP/OBS MODERATE 35: CPT | Performed by: INTERNAL MEDICINE

## 2023-11-23 RX ORDER — TORSEMIDE 20 MG/1
40 TABLET ORAL DAILY
Status: DISCONTINUED | OUTPATIENT
Start: 2023-11-23 | End: 2023-11-25

## 2023-11-23 RX ORDER — TORSEMIDE 20 MG/1
20 TABLET ORAL DAILY
Status: DISCONTINUED | OUTPATIENT
Start: 2023-11-24 | End: 2023-11-24

## 2023-11-23 RX ADMIN — HEPARIN SODIUM 5000 UNITS: 5000 INJECTION INTRAVENOUS; SUBCUTANEOUS at 06:15

## 2023-11-23 RX ADMIN — ISOSORBIDE MONONITRATE 30 MG: 30 TABLET, EXTENDED RELEASE ORAL at 08:18

## 2023-11-23 RX ADMIN — CYANOCOBALAMIN TAB 500 MCG 1000 MCG: 500 TAB at 08:18

## 2023-11-23 RX ADMIN — METHOCARBAMOL 750 MG: 750 TABLET ORAL at 08:19

## 2023-11-23 RX ADMIN — INSULIN LISPRO 1 UNITS: 100 INJECTION, SOLUTION INTRAVENOUS; SUBCUTANEOUS at 02:12

## 2023-11-23 RX ADMIN — INSULIN LISPRO 5 UNITS: 100 INJECTION, SOLUTION INTRAVENOUS; SUBCUTANEOUS at 16:31

## 2023-11-23 RX ADMIN — PRAVASTATIN SODIUM 10 MG: 10 TABLET ORAL at 16:06

## 2023-11-23 RX ADMIN — INSULIN LISPRO 3 UNITS: 100 INJECTION, SOLUTION INTRAVENOUS; SUBCUTANEOUS at 11:17

## 2023-11-23 RX ADMIN — SENNOSIDES 8.6 MG: 8.6 TABLET, FILM COATED ORAL at 08:19

## 2023-11-23 RX ADMIN — ALLOPURINOL 150 MG: 300 TABLET ORAL at 08:18

## 2023-11-23 RX ADMIN — PREDNISONE 5 MG: 5 TABLET ORAL at 08:19

## 2023-11-23 RX ADMIN — HYDROCODONE BITARTRATE AND ACETAMINOPHEN 1 TABLET: 5; 325 TABLET ORAL at 06:50

## 2023-11-23 RX ADMIN — INSULIN LISPRO 3 UNITS: 100 INJECTION, SOLUTION INTRAVENOUS; SUBCUTANEOUS at 21:19

## 2023-11-23 RX ADMIN — GABAPENTIN 300 MG: 300 CAPSULE ORAL at 21:21

## 2023-11-23 RX ADMIN — ASPIRIN 81 MG CHEWABLE TABLET 81 MG: 81 TABLET CHEWABLE at 08:18

## 2023-11-23 RX ADMIN — CLOPIDOGREL BISULFATE 75 MG: 75 TABLET ORAL at 08:19

## 2023-11-23 RX ADMIN — Medication 2 G: at 16:06

## 2023-11-23 RX ADMIN — HEPARIN SODIUM 5000 UNITS: 5000 INJECTION INTRAVENOUS; SUBCUTANEOUS at 21:22

## 2023-11-23 RX ADMIN — POLYETHYLENE GLYCOL 3350 17 G: 17 POWDER, FOR SOLUTION ORAL at 08:18

## 2023-11-23 RX ADMIN — Medication 2 G: at 08:19

## 2023-11-23 RX ADMIN — INSULIN GLARGINE 5 UNITS: 100 INJECTION, SOLUTION SUBCUTANEOUS at 21:21

## 2023-11-23 RX ADMIN — LEVOTHYROXINE SODIUM 100 MCG: 100 TABLET ORAL at 06:15

## 2023-11-23 RX ADMIN — GABAPENTIN 300 MG: 300 CAPSULE ORAL at 08:19

## 2023-11-23 RX ADMIN — SERTRALINE 25 MG: 25 TABLET, FILM COATED ORAL at 08:19

## 2023-11-23 RX ADMIN — METOPROLOL TARTRATE 25 MG: 25 TABLET, FILM COATED ORAL at 21:22

## 2023-11-23 RX ADMIN — TORSEMIDE 40 MG: 20 TABLET ORAL at 16:05

## 2023-11-23 RX ADMIN — TORSEMIDE 20 MG: 20 TABLET ORAL at 08:19

## 2023-11-23 RX ADMIN — Medication 2 G: at 21:23

## 2023-11-23 RX ADMIN — METOPROLOL TARTRATE 25 MG: 25 TABLET, FILM COATED ORAL at 08:19

## 2023-11-23 NOTE — ASSESSMENT & PLAN NOTE
Patient had prehospital stroke alert initiated.  Admitted under stroke pathway  MRI with "Acute lacunar infarct in the right centrum semiovale."  Patient with improving strength in RUE, but continues to have some weakness  Continue stroke pathway  Appreciate neuro recommendations    PT/ OT

## 2023-11-23 NOTE — ASSESSMENT & PLAN NOTE
MRI reviewed  Patient started on Rocephin empirically for possible urinary tract infection-now discontinued  Follow-up on final cultures  Continue with stroke pathway work-up as above.   PT/OT

## 2023-11-23 NOTE — PLAN OF CARE
Problem: PAIN - ADULT  Goal: Verbalizes/displays adequate comfort level or baseline comfort level  Description: Interventions:  - Encourage patient to monitor pain and request assistance  - Assess pain using appropriate pain scale  - Administer analgesics based on type and severity of pain and evaluate response  - Implement non-pharmacological measures as appropriate and evaluate response  - Consider cultural and social influences on pain and pain management  - Notify physician/advanced practitioner if interventions unsuccessful or patient reports new pain  Outcome: Progressing     Problem: SAFETY ADULT  Goal: Patient will remain free of falls  Description: INTERVENTIONS:  - Educate patient/family on patient safety including physical limitations  - Instruct patient to call for assistance with activity   - Consult OT/PT to assist with strengthening/mobility   - Keep Call bell within reach  - Keep bed low and locked with side rails adjusted as appropriate  - Keep care items and personal belongings within reach  - Initiate and maintain comfort rounds  - Make Fall Risk Sign visible to staff  - Offer Toileting every *** Hours, in advance of need  - Initiate/Maintain ***alarm  - Obtain necessary fall risk management equipment: ***  - Apply yellow socks and bracelet for high fall risk patients  - Consider moving patient to room near nurses station  Outcome: Progressing     Problem: Knowledge Deficit  Goal: Patient/family/caregiver demonstrates understanding of disease process, treatment plan, medications, and discharge instructions  Description: Complete learning assessment and assess knowledge base.   Interventions:  - Provide teaching at level of understanding  - Provide teaching via preferred learning methods  Outcome: Progressing     Problem: NEUROSENSORY - ADULT  Goal: Achieves stable or improved neurological status  Description: INTERVENTIONS  - Monitor and report changes in neurological status  - Monitor vital signs such as temperature, blood pressure, glucose, and any other labs ordered   - Initiate measures to prevent increased intracranial pressure  - Monitor for seizure activity and implement precautions if appropriate      Outcome: Progressing  Goal: Remains free of injury related to seizures activity  Description: INTERVENTIONS  - Maintain airway, patient safety  and administer oxygen as ordered  - Monitor patient for seizure activity, document and report duration and description of seizure to physician/advanced practitioner  - If seizure occurs,  ensure patient safety during seizure  - Reorient patient post seizure  - Seizure pads on all 4 side rails  - Instruct patient/family to notify RN of any seizure activity including if an aura is experienced  - Instruct patient/family to call for assistance with activity based on nursing assessment  - Administer anti-seizure medications if ordered    Outcome: Progressing  Goal: Achieves maximal functionality and self care  Description: INTERVENTIONS  - Monitor swallowing and airway patency with patient fatigue and changes in neurological status  - Encourage and assist patient to increase activity and self care.    - Encourage visually impaired, hearing impaired and aphasic patients to use assistive/communication devices  Outcome: Progressing     Problem: SKIN/TISSUE INTEGRITY - ADULT  Goal: Incision(s), wounds(s) or drain site(s) healing without S/S of infection  Description: INTERVENTIONS  - Assess and document dressing, incision, wound bed, drain sites and surrounding tissue  - Provide patient and family education  - Perform skin care/dressing changes every ***  Outcome: Progressing  Goal: Pressure injury heals and does not worsen  Description: Interventions:  - Implement low air loss mattress or specialty surface (Criteria met)  - Apply silicone foam dressing  - Instruct/assist with weight shifting every *** minutes when in chair   - Limit chair time to *** hour intervals  - Use special pressure reducing interventions such as *** when in chair   - Apply fecal or urinary incontinence containment device   - Perform passive or active ROM every ***  - Turn and reposition patient & offload bony prominences every *** hours   - Utilize friction reducing device or surface for transfers   - Consider consults to  interdisciplinary teams such as ***  - Use incontinent care products after each incontinent episode such as ***  - Consider nutrition services referral as needed  Outcome: Progressing     Problem: MUSCULOSKELETAL - ADULT  Goal: Maintain or return mobility to safest level of function  Description: INTERVENTIONS:  - Assess patient's ability to carry out ADLs; assess patient's baseline for ADL function and identify physical deficits which impact ability to perform ADLs (bathing, care of mouth/teeth, toileting, grooming, dressing, etc.)  - Assess/evaluate cause of self-care deficits   - Assess range of motion  - Assess patient's mobility  - Assess patient's need for assistive devices and provide as appropriate  - Encourage maximum independence but intervene and supervise when necessary  - Involve family in performance of ADLs  - Assess for home care needs following discharge   - Consider OT consult to assist with ADL evaluation and planning for discharge  - Provide patient education as appropriate  Outcome: Progressing     Problem: Nutrition/Hydration-ADULT  Goal: Nutrient/Hydration intake appropriate for improving, restoring or maintaining nutritional needs  Description: Monitor and assess patient's nutrition/hydration status for malnutrition. Collaborate with interdisciplinary team and initiate plan and interventions as ordered. Monitor patient's weight and dietary intake as ordered or per policy. Utilize nutrition screening tool and intervene as necessary. Determine patient's food preferences and provide high-protein, high-caloric foods as appropriate.      INTERVENTIONS:  - Monitor oral intake, urinary output, labs, and treatment plans  - Assess nutrition and hydration status and recommend course of action  - Evaluate amount of meals eaten  - Assist patient with eating if necessary   - Allow adequate time for meals  - Recommend/ encourage appropriate diets, oral nutritional supplements, and vitamin/mineral supplements  - Order, calculate, and assess calorie counts as needed  - Recommend, monitor, and adjust tube feedings and TPN/PPN based on assessed needs  - Assess need for intravenous fluids  - Provide specific nutrition/hydration education as appropriate  - Include patient/family/caregiver in decisions related to nutrition  Outcome: Progressing     Problem: Prexisting or High Potential for Compromised Skin Integrity  Goal: Skin integrity is maintained or improved  Description: INTERVENTIONS:  - Identify patients at risk for skin breakdown  - Assess and monitor skin integrity  - Assess and monitor nutrition and hydration status  - Monitor labs   - Assess for incontinence   - Turn and reposition patient  - Assist with mobility/ambulation  - Relieve pressure over bony prominences  - Avoid friction and shearing  - Provide appropriate hygiene as needed including keeping skin clean and dry  - Evaluate need for skin moisturizer/barrier cream  - Collaborate with interdisciplinary team   - Patient/family teaching  - Consider wound care consult   Outcome: Progressing     Problem: SAFETY ADULT  Goal: Patient will remain free of falls  Description: INTERVENTIONS:  - Educate patient/family on patient safety including physical limitations  - Instruct patient to call for assistance with activity   - Consult OT/PT to assist with strengthening/mobility   - Keep Call bell within reach  - Keep bed low and locked with side rails adjusted as appropriate  - Keep care items and personal belongings within reach  - Initiate and maintain comfort rounds  - Make Fall Risk Sign visible to staff  - Offer Toileting every *** Hours, in advance of need  - Initiate/Maintain ***alarm  - Obtain necessary fall risk management equipment: ***  - Apply yellow socks and bracelet for high fall risk patients  - Consider moving patient to room near nurses station  Outcome: Progressing     Problem: Knowledge Deficit  Goal: Patient/family/caregiver demonstrates understanding of disease process, treatment plan, medications, and discharge instructions  Description: Complete learning assessment and assess knowledge base.   Interventions:  - Provide teaching at level of understanding  - Provide teaching via preferred learning methods  Outcome: Progressing

## 2023-11-23 NOTE — ASSESSMENT & PLAN NOTE
Lab Results   Component Value Date    EGFR 34 11/23/2023    EGFR 27 11/22/2023    EGFR 24 11/21/2023    CREATININE 1.35 (H) 11/23/2023    CREATININE 1.66 (H) 11/22/2023    CREATININE 1.80 (H) 11/21/2023   Monitor renal function  Slightly incrased today but within baseline in setting of ckd  Increase torsemide to 40mg evening dose due to lower extremity edema, crackles on exam  Cr and volume status improving on torsemide 20 and 40 mg in the p.m.   creatinine 1.35 today on 11/23 improving on increased diuretic dose

## 2023-11-23 NOTE — ASSESSMENT & PLAN NOTE
Of note, patient is followed by [facility] palliative care. Note patient's outpatient notes. Confirmed with granddaughter that she is a level 3 DNR. Kerrie Hill She has been followed by [place] palliative care due to history of heart failure. We will continue with aggressive medical management and work-up for stroke. Family is agreeable to stroke pathway work-up and recommendations. Patient presents with left-sided weakness which is gradually improving on presentation to the ED.

## 2023-11-23 NOTE — ASSESSMENT & PLAN NOTE
Wt Readings from Last 3 Encounters:   11/20/23 73.9 kg (163 lb)   11/19/23 73.9 kg (163 lb)   09/05/23 72.6 kg (160 lb)       Continue with Plavix, Imdur, Lopressor and torsemide 20 mg am and 40mg pm  Patient followed by palliative care as an outpatient with [place] care at [facility]

## 2023-11-23 NOTE — PROGRESS NOTES
[address] [facility]  Progress Note  Name: Awais Ellison  MRN: [id number]  Unit/Bed#: [facility]PHP 710-01 I Date of Admission: 11/18/2023   Date of Service: 11/23/2023 I Hospital Day: 5    Assessment/Plan   Goals of care, counseling/discussion  Assessment & Plan  Of note, patient is followed by [facility] palliative care. Note patient's outpatient notes. Confirmed with granddaughter that she is a level 3 DNR. Kamille Daniel She has been followed by [place] palliative care due to history of heart failure. We will continue with aggressive medical management and work-up for stroke. Family is agreeable to stroke pathway work-up and recommendations. Patient presents with left-sided weakness which is gradually improving on presentation to the ED.     Hyperlipidemia  Assessment & Plan  Patient reportedly intolerant to statins, but in review of her recent meds she does not appear to have trialed too many low intensity statins, discussed with patient and family at bedside and will try lowest dose pravastatin at 10 mg daily at this time    DM2 (diabetes mellitus, type 2) ([facility])  Assessment & Plan    Lab Results   Component Value Date    HGBA1C 7.0 (H) 11/19/2023   Sliding-scale insulin    Rheumatoid arthritis ([address])  Assessment & Plan  Continue prednisone 5 mg daily    CHF (congestive heart failure) ([address])  Assessment & Plan  Wt Readings from Last 3 Encounters:   11/20/23 73.9 kg (163 lb)   11/19/23 73.9 kg (163 lb)   09/05/23 72.6 kg (160 lb)       Continue with Plavix, Imdur, Lopressor and torsemide 20 mg am and 40mg pm  Patient followed by palliative care as an outpatient with [place] care at [address] chronic kidney disease [facility])  Assessment & Plan  Lab Results   Component Value Date    EGFR 34 11/23/2023    EGFR 27 11/22/2023    EGFR 24 11/21/2023    CREATININE 1.35 (H) 11/23/2023    CREATININE 1.66 (H) 11/22/2023    CREATININE 1.80 (H) 11/21/2023   Monitor renal function  Slightly incrased today but within baseline in setting of ckd  Increase torsemide to 40mg evening dose due to lower extremity edema, crackles on exam  Cr and volume status improving on torsemide 20 and 40 mg in the p.m.   creatinine 1.35 today on 11/23 improving on increased diuretic dose    SLE (systemic lupus erythematosus) ([address])  Assessment & Plan  Currently on prednisone. Daughter confirms that she is taking 5 mg daily    Hypothyroidism  Assessment & Plan  Levothyroxine 100 daily. Patient previously was taking 112 which was changed to 100. We will continue with levothyroxine at 100 mcg daily    * Stroke-like symptoms  Assessment & Plan  Patient had prehospital stroke alert initiated. Admitted under stroke pathway  MRI with "Acute lacunar infarct in the right centrum semiovale."  Patient with improving strength in RUE, but continues to have some weakness  Continue stroke pathway  Appreciate neuro recommendations    PT/ OT      Lethargy-resolved as of 11/20/2023  Assessment & Plan  MRI reviewed  Patient started on Rocephin empirically for possible urinary tract infection-now discontinued  Follow-up on final cultures  Continue with stroke pathway work-up as above. PT/OT               VTE Pharmacologic Prophylaxis:   High Risk (Score >/= 5) - Pharmacological DVT Prophylaxis Ordered: heparin. Sequential Compression Devices Ordered. Mobility:   Basic Mobility Inpatient Raw Score: 7  -HL Goal: 2: Bed activities/Dependent transfer  -HL Achieved: 1: Laying in bed  [facility] Goal achieved. Continue to encourage appropriate mobility. Patient Centered Rounds: I performed bedside rounds with nursing staff today. Discussions with Specialists or Other Care Team Provider: n/a    Education and Discussions with Family / Patient: Attempted to update  (daughter) via phone. Left voicemail. Total Time Spent on Date of Encounter in care of patient: 35 mins.  This time was spent on one or more of the following: performing physical exam; counseling and coordination of care; obtaining or reviewing history; documenting in the medical record; reviewing/ordering tests, medications or procedures; communicating with other healthcare professionals and discussing with patient's family/caregivers. Current Length of Stay: 5 day(s)  Current Patient Status: Inpatient   Certification Statement: The patient will continue to require additional inpatient hospital stay due to pending placement to rehab  Discharge Plan: Anticipate discharge in 24-48 hrs to rehab facility. Code Status: Level 3 - DNAR and DNI    Subjective:   Patient denies any acute complaints    Objective:     Vitals:   Temp (24hrs), Av.1 °F (36.7 °C), Min:98 °F (36.7 °C), Max:98.2 °F (36.8 °C)    Temp:  [98 °F (36.7 °C)-98.2 °F (36.8 °C)] 98 °F (36.7 °C)  HR:  [60-65] 65  Resp:  [19] 19  BP: (113-146)/(48-93) 146/93  SpO2:  [94 %] 94 %  Body mass index is 32.92 kg/m². Input and Output Summary (last 24 hours): Intake/Output Summary (Last 24 hours) at 2023 1350  Last data filed at 2023 1301  Gross per 24 hour   Intake 840 ml   Output --   Net 840 ml       Physical Exam:   Physical Exam  Vitals reviewed. Constitutional:       General: She is not in acute distress. Appearance: She is obese. She is not ill-appearing. HENT:      Head: Normocephalic. Mouth/Throat:      Mouth: Mucous membranes are moist.   Eyes:      General: No scleral icterus. Extraocular Movements: Extraocular movements intact. Cardiovascular:      Rate and Rhythm: Normal rate and regular rhythm. Pulses: Normal pulses. Heart sounds: No murmur heard. No friction rub. No gallop. Pulmonary:      Effort: Pulmonary effort is normal. No respiratory distress. Breath sounds: No wheezing, rhonchi or rales. Abdominal:      General: Abdomen is flat. Bowel sounds are normal. There is no distension. Palpations: Abdomen is soft. Tenderness:  There is no abdominal tenderness. There is no guarding or rebound. Musculoskeletal:      Right lower leg: No edema. Left lower leg: No edema. Skin:     General: Skin is warm. Capillary Refill: Capillary refill takes less than 2 seconds. Coloration: Skin is not jaundiced. Findings: No rash. Neurological:      General: No focal deficit present. Mental Status: She is alert and oriented to person, place, and time. Sensory: No sensory deficit. Motor: Weakness present. Comments: LLE 2/5 strength   Psychiatric:         Mood and Affect: Mood normal.         Behavior: Behavior normal.          Additional Data:     Labs:  Results from last 7 days   Lab Units 11/23/23  0641   WBC Thousand/uL 5.93   HEMOGLOBIN g/dL 10.0*   HEMATOCRIT % 32.6*   PLATELETS Thousands/uL 151   NEUTROS PCT % 55   LYMPHS PCT % 34   MONOS PCT % 7   EOS PCT % 1     Results from last 7 days   Lab Units 11/23/23  0641 11/20/23  0513 11/19/23  0844   SODIUM mmol/L 140   < > 138   POTASSIUM mmol/L 4.2   < > 3.9   CHLORIDE mmol/L 97   < > 93*   CO2 mmol/L 32   < > 33*   BUN mg/dL 52*   < > 58*   CREATININE mg/dL 1.35*   < > 1.52*   ANION GAP mmol/L 11   < > 12   CALCIUM mg/dL 8.6   < > 9.1   ALBUMIN g/dL  --   --  3.7   TOTAL BILIRUBIN mg/dL  --   --  0.38   ALK PHOS U/L  --   --  64   ALT U/L  --   --  17   AST U/L  --   --  20   GLUCOSE RANDOM mg/dL 69   < > 199*    < > = values in this interval not displayed.      Results from last 7 days   Lab Units 11/18/23  1611   INR  0.94     Results from last 7 days   Lab Units 11/23/23  0633 11/23/23  0209 11/22/23  2134 11/22/23  1606 11/22/23  1113 11/22/23  0626 11/21/23  2135 11/21/23  1602 11/21/23  1130 11/21/23  0639 11/21/23  0251 11/20/23  2119   POC GLUCOSE mg/dl 80 184* 323* 235* 216* 141* 185* 165* 300* 108 118 136     Results from last 7 days   Lab Units 11/19/23  0844   HEMOGLOBIN A1C % 7.0*     Results from last 7 days   Lab Units 11/18/23  1520   PROCALCITONIN ng/ml 0.10 Lines/Drains:  Invasive Devices       Peripheral Intravenous Line  Duration             Peripheral IV 11/22/23 Left;Ventral (anterior) Forearm 1 day              Drain  Duration             External Urinary Catheter 552 days                          Imaging: No pertinent imaging reviewed. Recent Cultures (last 7 days):   Results from last 7 days   Lab Units 11/18/23  1805 11/18/23  1612   BLOOD CULTURE  No Growth After 4 Days. No Growth After 4 Days.   --    URINE CULTURE   --  >100,000 cfu/ml Aerococcus urinae*  80,000-89,000 cfu/ml Escherichia coli*       Last 24 Hours Medication List:   Current Facility-Administered Medications   Medication Dose Route Frequency Provider Last Rate    albuterol  2 puff Inhalation Q4H PRN Hetul Shine, DO      allopurinol  150 mg Oral Daily Hetul Shine, DO      aspirin  81 mg Oral Daily Aun Sanket      calcium carbonate  1,000 mg Oral Daily PRN Hetul Shine, DO      clopidogrel  75 mg Oral Daily Hetul Shine, DO      colchicine  0.6 mg Oral Every Other Day Hetul Shine, DO      vitamin B-12  1,000 mcg Oral Daily Blake Solorio, DO      Diclofenac Sodium  2 g Topical 4x Daily Adali Monroy      gabapentin  300 mg Oral BID Hetul Shine, DO      heparin (porcine)  5,000 Units Subcutaneous Q8H [facility] Hetul Shine, DO      HYDROcodone-acetaminophen  1 tablet Oral Q6H PRN Boaz [facility]      insulin glargine  5 Units Subcutaneous HS Oralberto Avelar Silva      insulin lispro  1-5 Units Subcutaneous TID AC NATHEN Brown      insulin lispro  1-5 Units Subcutaneous HS Orien Pleva, NATHEN      insulin lispro  1-5 Units Subcutaneous [address]iao      isosorbide mononitrate  30 mg Oral Daily Hetul Shine, DO      levothyroxine  100 mcg Oral Early Morning Hetul Shine, DO      methocarbamol  750 mg Oral Q6H PRN Hetul Shine, DO      metoprolol tartrate  25 mg Oral Q12H [facility] Hetul Shine, DO      ondansetron  4 mg Intravenous Q6H PRN Hetul Shine, DO      polyethylene glycol  17 g Oral Daily Hetul Shine, DO      pravastatin  10 mg Oral Daily With [address] Silva      predniSONE  5 mg Oral Daily Hetul Shine, DO      senna  8.6 mg Oral Daily Hetul Shine, DO      sertraline  25 mg Oral Daily Hetul Shine, DO      torsemide  20 mg Oral Daily Blake Banai, DO      torsemide  40 mg Oral Daily Blake Banai, DO          Today, Patient Was Seen By: Eleno Anderson DO    **Please Note: This note may have been constructed using a voice recognition system. **

## 2023-11-24 PROBLEM — G93.41 METABOLIC ENCEPHALOPATHY: Status: ACTIVE | Noted: 2023-11-24

## 2023-11-24 PROBLEM — I63.9 CVA (CEREBRAL VASCULAR ACCIDENT) (HCC): Status: ACTIVE | Noted: 2023-11-18

## 2023-11-24 LAB
BACTERIA BLD CULT: NORMAL
BACTERIA BLD CULT: NORMAL
GLUCOSE SERPL-MCNC: 122 MG/DL (ref 65–140)
GLUCOSE SERPL-MCNC: 171 MG/DL (ref 65–140)
GLUCOSE SERPL-MCNC: 184 MG/DL (ref 65–140)
GLUCOSE SERPL-MCNC: 186 MG/DL (ref 65–140)
GLUCOSE SERPL-MCNC: 424 MG/DL (ref 65–140)

## 2023-11-24 PROCEDURE — 97535 SELF CARE MNGMENT TRAINING: CPT

## 2023-11-24 PROCEDURE — 97110 THERAPEUTIC EXERCISES: CPT

## 2023-11-24 PROCEDURE — 82948 REAGENT STRIP/BLOOD GLUCOSE: CPT

## 2023-11-24 PROCEDURE — 97530 THERAPEUTIC ACTIVITIES: CPT

## 2023-11-24 PROCEDURE — 99232 SBSQ HOSP IP/OBS MODERATE 35: CPT | Performed by: INTERNAL MEDICINE

## 2023-11-24 RX ORDER — INSULIN GLARGINE 100 [IU]/ML
10 INJECTION, SOLUTION SUBCUTANEOUS
Status: DISCONTINUED | OUTPATIENT
Start: 2023-11-24 | End: 2023-11-24

## 2023-11-24 RX ORDER — TORSEMIDE 20 MG/1
40 TABLET ORAL DAILY
Status: DISCONTINUED | OUTPATIENT
Start: 2023-11-25 | End: 2023-11-25

## 2023-11-24 RX ORDER — INSULIN LISPRO 100 [IU]/ML
4 INJECTION, SOLUTION INTRAVENOUS; SUBCUTANEOUS
Status: DISCONTINUED | OUTPATIENT
Start: 2023-11-24 | End: 2023-11-28 | Stop reason: HOSPADM

## 2023-11-24 RX ADMIN — LEVOTHYROXINE SODIUM 100 MCG: 100 TABLET ORAL at 06:28

## 2023-11-24 RX ADMIN — INSULIN LISPRO 5 UNITS: 100 INJECTION, SOLUTION INTRAVENOUS; SUBCUTANEOUS at 11:54

## 2023-11-24 RX ADMIN — PREDNISONE 5 MG: 5 TABLET ORAL at 09:59

## 2023-11-24 RX ADMIN — INSULIN LISPRO 1 UNITS: 100 INJECTION, SOLUTION INTRAVENOUS; SUBCUTANEOUS at 22:35

## 2023-11-24 RX ADMIN — Medication 2 G: at 10:01

## 2023-11-24 RX ADMIN — GABAPENTIN 300 MG: 300 CAPSULE ORAL at 21:59

## 2023-11-24 RX ADMIN — HEPARIN SODIUM 5000 UNITS: 5000 INJECTION INTRAVENOUS; SUBCUTANEOUS at 06:28

## 2023-11-24 RX ADMIN — SERTRALINE 25 MG: 25 TABLET, FILM COATED ORAL at 09:59

## 2023-11-24 RX ADMIN — ASPIRIN 81 MG CHEWABLE TABLET 81 MG: 81 TABLET CHEWABLE at 09:59

## 2023-11-24 RX ADMIN — HEPARIN SODIUM 5000 UNITS: 5000 INJECTION INTRAVENOUS; SUBCUTANEOUS at 22:00

## 2023-11-24 RX ADMIN — INSULIN LISPRO 4 UNITS: 100 INJECTION, SOLUTION INTRAVENOUS; SUBCUTANEOUS at 11:55

## 2023-11-24 RX ADMIN — CYANOCOBALAMIN TAB 500 MCG 1000 MCG: 500 TAB at 09:58

## 2023-11-24 RX ADMIN — GABAPENTIN 300 MG: 300 CAPSULE ORAL at 09:58

## 2023-11-24 RX ADMIN — PRAVASTATIN SODIUM 10 MG: 10 TABLET ORAL at 16:19

## 2023-11-24 RX ADMIN — SENNOSIDES 8.6 MG: 8.6 TABLET, FILM COATED ORAL at 09:59

## 2023-11-24 RX ADMIN — POLYETHYLENE GLYCOL 3350 17 G: 17 POWDER, FOR SOLUTION ORAL at 10:02

## 2023-11-24 RX ADMIN — INSULIN LISPRO 1 UNITS: 100 INJECTION, SOLUTION INTRAVENOUS; SUBCUTANEOUS at 16:19

## 2023-11-24 RX ADMIN — TORSEMIDE 20 MG: 20 TABLET ORAL at 09:57

## 2023-11-24 RX ADMIN — HYDROCODONE BITARTRATE AND ACETAMINOPHEN 1 TABLET: 5; 325 TABLET ORAL at 22:39

## 2023-11-24 RX ADMIN — METOPROLOL TARTRATE 25 MG: 25 TABLET, FILM COATED ORAL at 09:58

## 2023-11-24 RX ADMIN — ISOSORBIDE MONONITRATE 30 MG: 30 TABLET, EXTENDED RELEASE ORAL at 09:59

## 2023-11-24 RX ADMIN — METOPROLOL TARTRATE 25 MG: 25 TABLET, FILM COATED ORAL at 21:59

## 2023-11-24 RX ADMIN — INSULIN LISPRO 4 UNITS: 100 INJECTION, SOLUTION INTRAVENOUS; SUBCUTANEOUS at 16:19

## 2023-11-24 RX ADMIN — Medication 2 G: at 22:00

## 2023-11-24 RX ADMIN — Medication 2 G: at 18:15

## 2023-11-24 RX ADMIN — TORSEMIDE 40 MG: 20 TABLET ORAL at 14:23

## 2023-11-24 RX ADMIN — HEPARIN SODIUM 5000 UNITS: 5000 INJECTION INTRAVENOUS; SUBCUTANEOUS at 13:45

## 2023-11-24 RX ADMIN — Medication 2 G: at 11:58

## 2023-11-24 RX ADMIN — ALLOPURINOL 150 MG: 300 TABLET ORAL at 09:59

## 2023-11-24 RX ADMIN — INSULIN LISPRO 1 UNITS: 100 INJECTION, SOLUTION INTRAVENOUS; SUBCUTANEOUS at 01:56

## 2023-11-24 RX ADMIN — CLOPIDOGREL BISULFATE 75 MG: 75 TABLET ORAL at 09:58

## 2023-11-24 RX ADMIN — HYDROCODONE BITARTRATE AND ACETAMINOPHEN 1 TABLET: 5; 325 TABLET ORAL at 00:01

## 2023-11-24 RX ADMIN — HYDROCODONE BITARTRATE AND ACETAMINOPHEN 1 TABLET: 5; 325 TABLET ORAL at 16:24

## 2023-11-24 RX ADMIN — COLCHICINE 0.6 MG: 0.6 TABLET ORAL at 09:59

## 2023-11-24 NOTE — PLAN OF CARE
Problem: PHYSICAL THERAPY ADULT  Goal: Performs mobility at highest level of function for planned discharge setting. See evaluation for individualized goals. Description: Treatment/Interventions: Functional transfer training, LE strengthening/ROM, Therapeutic exercise, Endurance training, Patient/family training, Equipment eval/education, Bed mobility, Gait training, Spoke to nursing, Spoke to case management, OT          See flowsheet documentation for full assessment, interventions and recommendations. Outcome: Progressing  Note: Prognosis: Fair  Problem List: Decreased strength, Decreased endurance, Impaired balance, Decreased mobility, Decreased coordination, Decreased cognition, Impaired judgement, Decreased safety awareness, Obesity, Pain  Assessment: Pt agreeable to participate in PT session. Pt performed functional mobility and therex as outlined above. Continues to be limited by gross weakness and fatigue as well as L hip and knee pain. VC for upright head and neck posture throughout. External cues for neck extension. Pt left supine in bed with HOB elevated and bed alarm donned. Call bell, phone, and all personal needs within reach. Pt will continue to benefit from skilled acute care PT to further address their functional mobility limitations. Based on pt presentation and PT clinical judgement, believe pt would best benefit from STR. Barriers to Discharge: Inaccessible home environment, Decreased caregiver support     Rehab Resource Intensity Level, PT: II (Moderate Resource Intensity)    See flowsheet documentation for full assessment.

## 2023-11-24 NOTE — OCCUPATIONAL THERAPY NOTE
Occupational Therapy Progress Note     Patient Name: Greta Finney  Today's Date: 11/24/2023  Problem List  Principal Problem:    CVA (cerebral vascular accident) [facility])  Active Problems:    Hypothyroidism    SLE (systemic lupus erythematosus) ([address])    Stage 4 chronic kidney disease (HCC)    CHF (congestive heart failure) (HCC)    Rheumatoid arthritis ([address])    DM2 (diabetes mellitus, type 2) ([address])    Hyperlipidemia    Goals of care, counseling/discussion    Metabolic encephalopathy        11/24/23 1127   OT Last Visit   OT Visit Date 11/24/23   Note Type   Note Type Treatment for insurance authorization   Pain Assessment   Pain Assessment Tool FLACC   Pain Location/Orientation Location: Knee;Orientation: Left   Hospital Pain Intervention(s) Ambulation/increased activity;Repositioned;Elevated; Emotional support;Relaxation technique   Pain Rating: FLACC (Rest) - Face 0   Pain Rating: FLACC (Rest) - Legs 0   Pain Rating: FLACC (Rest) - Activity 0   Pain Rating: FLACC (Rest) - Cry 0   Pain Rating: FLACC (Rest) - Consolability 0   Score: FLACC (Rest) 0   Pain Rating: FLACC (Activity) - Face 1   Pain Rating: FLACC (Activity) - Legs 0   Pain Rating: FLACC (Activity) - Activity 1   Pain Rating: FLACC (Activity) - Cry 1   Pain Rating: FLACC (Activity) - Consolability 1   Score: FLACC (Activity) 4   Restrictions/Precautions   Weight Bearing Precautions Per Order No   Other Precautions Cognitive; Chair Alarm; Bed Alarm;Telemetry; Fall Risk;Pain;Multiple lines   Lifestyle   Autonomy Pt reports I w/ ADLs and fxnl mobility w/ RW and requires assist w/ IADLs at baseline; - driving. Reciprocal Relationships Pt lives in an in-law suite attached to her daughter and son-in-law's house. 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    ADL   Eating Assistance 5  Supervision/Setup   Eating Deficit Setup   Grooming Assistance 5  Supervision/Setup   Grooming Deficit Wash/dry face LB Dressing Assistance 1  Total Assistance   LB Dressing Deficit Don/doff R sock; Don/doff L sock; Don/doff R shoe;Don/doff L shoe  (pt reports has LHAE for LB dressing at baseline)   Toileting Assistance  1  Total Assistance   Toileting Deficit Perineal hygiene  (incontinent of urine)   Bed Mobility   Rolling R 2  Maximal assistance   Additional items Assist x 1 for pad changes 2* urine incontinence. Rolling L 2  Maximal assistance   Additional items Assist x 1   Supine to Sit 2  Maximal assistance   Additional items Assist x 2   Sit to Supine 2  Maximal assistance   Additional items Assist x 2   Transfers   Sit to Stand 2  Maximal assistance   Additional items Assist x 2   Stand to Sit 2  Maximal assistance   Additional items Assist x 2   Additional Comments +B/l HHA and B/L knee blocked with cues for upright posture 2x standing tolerance approx~30 seconds   Functional Mobility   Additional Comments unable to assess   Cognition   Overall Cognitive Status Impaired   Arousal/Participation Alert; Responsive; Cooperative   Attention Attends with cues to redirect   Orientation Level Oriented X4   Memory Decreased recall of precautions   Following Commands Follows multistep commands with increased time or repetition   Comments pt pleasant and cooperative, oriented, mild slow processing, impaired self awareness (incontinence, sitting posture)   Assessment   Assessment Patient participated in Skilled OT session this date with interventions consisting of self care tasks, EOB sitting tolerance/balance, sit to stand transfers, standing tolerance. Bed mobility with rolling for pad change . Patient agreeable to OT treatment session, upon arrival patient was found supine in bed. In comparison to previous session, patient with improvements in sitting tolerance . Patient requiring verbal cues for safety, verbal cues for correct technique, one step directives, and frequent rest periods.  Patient continues to be functioning below baseline level, occupational performance remains limited secondary to factors listed above and increased risk for falls and injury. From OT standpoint, recommendation at time of d/c would be Short Term Rehab. The patient's raw score on the AM-PAC Daily Activity Inpatient Short Form is 16. A raw score of less than 19 suggests the patient may benefit from discharge to post-acute rehabilitation services. Please refer to the recommendation of the Occupational Therapist for safe discharge planning. Patient to benefit from continued Occupational Therapy treatment while in the hospital to address deficits as defined above and maximize level of functional independence with ADLs and functional mobility. Plan   Treatment Interventions ADL retraining;Functional transfer training;UE strengthening/ROM; Endurance training;Cognitive reorientation;Patient/family training;Equipment evaluation/education; Compensatory technique education; Energy conservation; Activityengagement   Goal Expiration Date 12/03/23   OT Treatment Day 1   OT Frequency 2-3x/wk   Discharge Recommendation   Rehab Resource Intensity Level, OT II (Moderate Resource Intensity)   AM-PAC Daily Activity Inpatient   Lower Body Dressing 2   Bathing 2   Toileting 2   Upper Body Dressing 3   Grooming 3   Eating 4   Daily Activity Raw Score 16   Daily Activity Standardized Score (Calc for Raw Score >=11) 35.96   -PAC Applied Cognition Inpatient   Following a Speech/Presentation 2   Understanding Ordinary Conversation 4   Taking Medications 3   Remembering Where Things Are Placed or Put Away 4   Remembering List of 4-5 Errands 3   Taking Care of Complicated Tasks 3   Applied Cognition Raw Score 19   Applied Cognition Standardized Score 39.77   Barthel Index   Feeding 10   Bathing 0   Grooming Score 0   Dressing Score 5   Bladder Score 0   Bowels Score 0   Toilet Use Score 5   Transfers (Bed/Chair) Score 5   Mobility (Level Surface) Score 0   Stairs Score 0   Barthel Index Score 25   Modified Fisk Scale   Modified Rosy Scale 4   End of Consult   Patient Position at End of Consult Bed/Chair alarm activated; All needs within reach; Supine   Nurse Communication Nurse aware of consult     Keli FENG, OTR/L

## 2023-11-24 NOTE — ASSESSMENT & PLAN NOTE
Patient had prehospital stroke alert initiated.  Admitted under stroke pathway  MRI with "Acute lacunar infarct in the right centrum semiovale."  Patient with improving strength in RUE, but continues to have some weakness  Continue stroke pathway  Appreciate neuro recommendations  Placement to SNF    PT/ OT

## 2023-11-24 NOTE — PHYSICAL THERAPY NOTE
PHYSICAL THERAPY NOTE          Patient Name: Nikole Ovalles  Today's Date: 11/24/2023 11/24/23 1205   PT Last Visit   PT Visit Date 11/24/23   Note Type   Note Type Treatment for insurance authorization   Pain Assessment   Pain Assessment Tool FLACC   Pain Location/Orientation Orientation: Left; Location: Knee; Location: Hip   Hospital Pain Intervention(s) Repositioned; Emotional support   Pain Rating: FLACC (Rest) - Face 0   Pain Rating: FLACC (Rest) - Legs 0   Pain Rating: FLACC (Rest) - Activity 0   Pain Rating: FLACC (Rest) - Cry 0   Pain Rating: FLACC (Rest) - Consolability 0   Score: FLACC (Rest) 0   Pain Rating: FLACC (Activity) - Face 1   Pain Rating: FLACC (Activity) - Legs 0   Pain Rating: FLACC (Activity) - Activity 1   Pain Rating: FLACC (Activity) - Cry 1   Pain Rating: FLACC (Activity) - Consolability 1   Score: FLACC (Activity) 4   Restrictions/Precautions   Weight Bearing Precautions Per Order No   Other Precautions Cognitive; Chair Alarm; Bed Alarm;Multiple lines;Telemetry; Fall Risk   General   Chart Reviewed Yes   Response to Previous Treatment Patient with no complaints from previous session. Family/Caregiver Present No   Cognition   Overall Cognitive Status Impaired   Arousal/Participation Cooperative   Attention Attends with cues to redirect   Orientation Level Oriented X4   Following Commands Follows one step commands with increased time or repetition   Comments very pleasant   Subjective   Subjective agreeable   Bed Mobility   Rolling R 2  Maximal assistance   Additional items Assist x 1; Increased time required;Verbal cues;LE management   Rolling L 2  Maximal assistance   Additional items Assist x 1; Increased time required;Verbal cues;LE management   Supine to Sit 2  Maximal assistance   Additional items Assist x 2; Increased time required;Verbal cues;LE management;HOB elevated   Sit to Supine 2  Maximal assistance   Additional items Assist x 2; Increased time required;Verbal cues;LE management   Transfers   Sit to Stand 2  Maximal assistance   Additional items Assist x 2; Increased time required;Verbal cues   Stand to Sit 2  Maximal assistance   Additional items Assist x 2; Increased time required;Verbal cues   Additional Comments x2 STS with b/l HHA and knee block standing for 30-40 sec each attempt   Ambulation/Elevation   Gait pattern Not appropriate   Ambulation/Elevation Additional Comments unable to coordinate steps   Balance   Static Sitting Poor +   Dynamic Sitting Poor +   Static Standing Poor -   Dynamic Standing Poor -   Ambulatory Zero   Endurance Deficit   Endurance Deficit Yes   Endurance Deficit Description fatigue, weakness   Activity Tolerance   Activity Tolerance Patient limited by fatigue;Patient limited by pain   Medical Staff Made Aware co-tx with OT, ALEJANDRO due to medical complexity and decreased activity tolerance   Nurse Made Aware yes-cleared   Exercises   Knee AROM Long Arc Quad Sitting;10 reps;AROM; Bilateral  (x2 sets)   Ankle Pumps Sitting;10 reps;AROM; Bilateral  (heel raises and toe raises-x10 reps each)   Marching Sitting;10 reps;AROM; Bilateral   Balance training  sitting EOB x17 min with close S/minAx1 for posterior drift   Assessment   Prognosis Fair   Problem List Decreased strength;Decreased endurance; Impaired balance;Decreased mobility; Decreased coordination;Decreased cognition; Impaired judgement;Decreased safety awareness; Obesity;Pain   Assessment Pt agreeable to participate in PT session. Pt performed functional mobility and therex as outlined above. Continues to be limited by gross weakness and fatigue as well as L hip and knee pain. VC for upright head and neck posture throughout. External cues for neck extension. Pt left supine in bed with HOB elevated and bed alarm donned. Call bell, phone, and all personal needs within reach.  Pt will continue to benefit from skilled acute care PT to further address their functional mobility limitations. Based on pt presentation and PT clinical judgement, believe pt would best benefit from STR. Barriers to Discharge Inaccessible home environment;Decreased caregiver support   Goals   Patient Goals to improve   STG Expiration Date 12/03/23   PT Treatment Day 2   Plan   Treatment/Interventions Functional transfer training;LE strengthening/ROM; Therapeutic exercise; Endurance training;Cognitive reorientation;Patient/family training;Equipment eval/education; Bed mobility;Gait training;Spoke to nursing;Spoke to case management;OT   Progress Slow progress, decreased activity tolerance   PT Frequency 3-5x/wk   Discharge Recommendation   Rehab Resource Intensity Level, PT II (Moderate Resource Intensity)   AM-PAC Basic Mobility Inpatient   Turning in Flat Bed Without Bedrails 1   Lying on Back to Sitting on Edge of Flat Bed Without Bedrails 1   Moving Bed to Chair 1   Standing Up From Chair Using Arms 1   Walk in Room 1   Climb 3-5 Stairs With Railing 1   Basic Mobility Inpatient Raw Score 6   Turning Head Towards Sound 4   Follow Simple Instructions 3   Low Function Basic Mobility Raw Score  13   Low Function Basic Mobility Standardized Score  20.14   Highest Level Of Mobility   JH-HLM Goal 2: Bed activities/Dependent transfer   JH-HLM Achieved 3: Sit at edge of bed   Latoya Lucero, PT, DPT

## 2023-11-24 NOTE — PLAN OF CARE
Problem: OCCUPATIONAL THERAPY ADULT  Goal: Performs self-care activities at highest level of function for planned discharge setting. See evaluation for individualized goals. Description: Treatment Interventions: ADL retraining, Functional transfer training, UE strengthening/ROM, Endurance training, Patient/family training, Equipment evaluation/education, Compensatory technique education, Continued evaluation, Energy conservation, Activityengagement          See flowsheet documentation for full assessment, interventions and recommendations. Note: Limitation: Decreased ADL status, Decreased UE ROM, Decreased UE strength, Decreased endurance, Decreased self-care trans, Decreased high-level ADLs     Assessment: Patient participated in Skilled OT session this date with interventions consisting of self care tasks, EOB sitting tolerance/balance, sit to stand transfers, standing tolerance. Bed mobility with rolling for pad change . Patient agreeable to OT treatment session, upon arrival patient was found supine in bed. In comparison to previous session, patient with improvements in sitting tolerance . Patient requiring verbal cues for safety, verbal cues for correct technique, one step directives, and frequent rest periods. Patient continues to be functioning below baseline level, occupational performance remains limited secondary to factors listed above and increased risk for falls and injury. From OT standpoint, recommendation at time of d/c would be Short Term Rehab. The patient's raw score on the AM-PAC Daily Activity Inpatient Short Form is 16. A raw score of less than 19 suggests the patient may benefit from discharge to post-acute rehabilitation services. Please refer to the recommendation of the Occupational Therapist for safe discharge planning.   Patient to benefit from continued Occupational Therapy treatment while in the hospital to address deficits as defined above and maximize level of functional independence with ADLs and functional mobility.      Rehab Resource Intensity Level, OT: II (Moderate Resource Intensity)

## 2023-11-24 NOTE — CASE MANAGEMENT
Case Management Discharge Planning Note    Patient name Kulwinder Hampton  Location [address] [place] [address] [id number]  : 1934 Date 2023       Current Admission Date: 2023  Current Admission Diagnosis:Stroke-like symptoms   Patient Active Problem List    Diagnosis Date Noted    Stroke-like symptoms 2023    Goals of care, counseling/discussion 2023    Fall 2023    Pressure injury of deep tissue 2023    Pancreatic lesion 2023    Rheumatoid arthritis ([address]) 2023    DM2 (diabetes mellitus, type 2) ([address]) 2023    History of renal artery stenosis 2023    Family history of gout 2023    Peripheral polyneuropathy 2023    Hypertension 2023    Lupus ([address]) 2023    Hyperlipidemia 2023    History of stroke 2023    Hemorrhoids 2023    Type 2 diabetes mellitus with diabetic neuropathy, without long-term current use of insulin ([address]) 02/15/2023    Primary hypertension 02/15/2023    Class 1 obesity due to excess calories without serious comorbidity with body mass index (BMI) of 33.0 to 33.9 in adult 02/15/2023    Thoracic spine fracture ([address]) 2023    Perineal hematoma 2023    Cervical spine fracture ([address]) 02/10/2023    Contusion 02/10/2023    CHF (congestive heart failure) ([address]) 02/10/2023    CKD (chronic kidney disease) stage 4, GFR 15-29 ml/min ([address]) 02/10/2023    Hypothyroidism 02/10/2023    Fall 2023    Ambulatory dysfunction 2022    Chronic midline low back pain without sciatica 2022    COVID-19 2022    Fall 2022    Fall 2022    Hypothyroidism 2022    DM type 2 (diabetes mellitus, type 2) ([address]) 2022    AZUL (obstructive sleep apnea) 2022    Essential hypertension 2022    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 ([address]) 2022    Renal artery stenosis ([address]) 2022    Lab test positive for detection of COVID-19 virus 2022    Recurrent UTI 09/28/2021    Multiple drug resistant organism (MDRO) culture positive 09/28/2021    HTN (hypertension), benign 12/14/2020    Chronic gout without tophus 00/72/9237    Toxic metabolic encephalopathy 06/45/4980    Aspiration pneumonia due to regurgitated food ([address]) 09/29/2020    Macrocytic anemia 09/29/2020    Nephrosclerosis arteriolar, stage 1-4 or unspecified chronic kidney disease 05/29/2020    Encounter for follow-up examination after completed treatment for malignant neoplasm 05/19/2020    Seasonal allergic rhinitis due to pollen 03/24/2020    Wheezing 11/04/2019    Stage 4 chronic kidney disease ([address]) 10/29/2019    Alkalosis 10/29/2019    Chronic respiratory failure with hypoxia ([address]) 10/21/2019    Chronic anemia 10/19/2019    SDH (subdural hematoma) ([address]) 10/19/2019    AZUL (obstructive sleep apnea) 10/19/2019    H/O spinal fusion 07/23/2019    Hyponatremia 01/21/2019    Chronic combined systolic and diastolic heart failure ([address]) 01/17/2019    Depression 01/17/2019    Essential hypertension 01/09/2019    Controlled type 2 diabetes mellitus with diabetic neuropathy, without long-term current use of insulin ([address]) 10/24/2017    SLE (systemic lupus erythematosus) ([address]) 10/24/2017    Vitamin D deficiency 08/22/2017    Renal artery stenosis ([address]) 02/08/2017    Peripheral polyneuropathy 02/07/2017    Hypothyroidism 09/23/2016    H/O: CVA (cerebrovascular accident) 09/23/2016    History of malignant neoplasm of breast 09/23/2016    History of aortic valve replacement 07/21/2015    RA (rheumatoid arthritis) ([address]) 07/21/2015    Hyperlipidemia 10/01/2013    Osteoporosis 10/01/2013    Obesity (BMI 30.0-34.9) 10/12/2012      LOS (days): 6  Geometric Mean LOS (GMLOS) (days): 2.90  Days to GMLOS:-2.9     OBJECTIVE:  Risk of Unplanned Readmission Score: 47.57         Current admission status: Inpatient   Preferred Pharmacy:   [facility]/pharmacy #[id number] Zepeda - [address]  [address]  [place] [address]  Phone: [phone] Fax: Leny Vargas, [address] [facility],[address]  [address] [facility]  [place] [address]  Phone: [phone] Fax: Elpidio Cheatham, [address] [place]  [address] [address]  Phone: [phone] Fax: [phone]    PATIENT/FAMILY REPORTS NO PREFERRED PHARMACY  No address on file      Primary Care Provider: Amy Noguera DO    Primary Insurance: [facility] REP  Secondary Insurance:     DISCHARGE DETAILS:    Discharge planning discussed with[de-identified] Timothy Lee     Contacts  Patient Contacts: Ramy Jacinto  Relationship to Patient[de-identified] Family  Contact Method: Phone  Phone Number: [phone]     Other Referral/Resources/Interventions Provided:  Referral Comments: Call was placed to Ramy Jacinto to discuss choice for rehab, SNF. Pt is not appropriate for acute rehab at this time. Received , left message for call back.       Ramy Jacinto, [phone]

## 2023-11-24 NOTE — CASE MANAGEMENT
Case Management Discharge Planning Note    Patient name Mary Balderas  Location [address] [place] [address] [address] [id number]  : 1934 Date 2023       Current Admission Date: 2023  Current Admission Diagnosis:CVA (cerebral vascular accident) [facility])   Patient Active Problem List    Diagnosis Date Noted    Metabolic encephalopathy     CVA (cerebral vascular accident) ([address]) 2023    Goals of care, counseling/discussion 2023    Fall 2023    Pressure injury of deep tissue 2023    Pancreatic lesion 2023    Rheumatoid arthritis ([address]) 2023    DM2 (diabetes mellitus, type 2) ([address]) 2023    History of renal artery stenosis 2023    Family history of gout 2023    Peripheral polyneuropathy 2023    Hypertension 2023    Lupus ([address]) 2023    Hyperlipidemia 2023    History of stroke 2023    Hemorrhoids 2023    Type 2 diabetes mellitus with diabetic neuropathy, without long-term current use of insulin ([address]) 02/15/2023    Primary hypertension 02/15/2023    Class 1 obesity due to excess calories without serious comorbidity with body mass index (BMI) of 33.0 to 33.9 in adult 02/15/2023    Thoracic spine fracture ([address]) 2023    Perineal hematoma 2023    Cervical spine fracture ([address]) 02/10/2023    Contusion 02/10/2023    CHF (congestive heart failure) ([address]) 02/10/2023    CKD (chronic kidney disease) stage 4, GFR 15-29 ml/min ([address]) 02/10/2023    Hypothyroidism 02/10/2023    Fall 2023    Ambulatory dysfunction 2022    Chronic midline low back pain without sciatica 2022    COVID-19 2022    Fall 2022    Fall 2022    Hypothyroidism 2022    DM type 2 (diabetes mellitus, type 2) ([address]) 2022    AZUL (obstructive sleep apnea) 2022    Essential hypertension 2022    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 ([address]) 2022    Renal artery stenosis ([address]) 2022    Lab test positive for detection of COVID-19 virus 05/19/2022    Recurrent UTI 09/28/2021    Multiple drug resistant organism (MDRO) culture positive 09/28/2021    HTN (hypertension), benign 12/14/2020    Chronic gout without tophus 60/08/1488    Toxic metabolic encephalopathy 64/57/2950    Aspiration pneumonia due to regurgitated food ([address]) 09/29/2020    Macrocytic anemia 09/29/2020    Nephrosclerosis arteriolar, stage 1-4 or unspecified chronic kidney disease 05/29/2020    Encounter for follow-up examination after completed treatment for malignant neoplasm 05/19/2020    Seasonal allergic rhinitis due to pollen 03/24/2020    Wheezing 11/04/2019    Stage 4 chronic kidney disease ([address]) 10/29/2019    Alkalosis 10/29/2019    Chronic respiratory failure with hypoxia ([address]) 10/21/2019    Chronic anemia 10/19/2019    SDH (subdural hematoma) ([address]) 10/19/2019    AZUL (obstructive sleep apnea) 10/19/2019    H/O spinal fusion 07/23/2019    Hyponatremia 01/21/2019    Chronic combined systolic and diastolic heart failure ([address]) 01/17/2019    Depression 01/17/2019    Essential hypertension 01/09/2019    Controlled type 2 diabetes mellitus with diabetic neuropathy, without long-term current use of insulin ([address]) 10/24/2017    SLE (systemic lupus erythematosus) ([address]) 10/24/2017    Vitamin D deficiency 08/22/2017    Renal artery stenosis ([address]) 02/08/2017    Peripheral polyneuropathy 02/07/2017    Hypothyroidism 09/23/2016    H/O: CVA (cerebrovascular accident) 09/23/2016    History of malignant neoplasm of breast 09/23/2016    History of aortic valve replacement 07/21/2015    RA (rheumatoid arthritis) ([address]) 07/21/2015    Hyperlipidemia 10/01/2013    Osteoporosis 10/01/2013    Obesity (BMI 30.0-34.9) 10/12/2012      LOS (days): 6  Geometric Mean LOS (GMLOS) (days): 2.90  Days to GMLOS:-2.9     OBJECTIVE:  Risk of Unplanned Readmission Score: 47.64         Current admission status: Inpatient   Preferred Pharmacy:   [facility]/pharmacy [address] [address], [place] - [address]  [address] [address]  Phone: [phone] Fax: [address] [facility], [address]  [facility] [address]  Phone: [phone] Fax: [phone]    [address] [place] [address], [address] [place] [address]  [facility] [address]  Phone: [phone] Fax: [phone]    PATIENT/FAMILY REPORTS NO PREFERRED PHARMACY  No address on file      Primary Care Provider: Ana Paula Valdez DO    Primary Insurance: [facility]  Secondary Insurance:     DISCHARGE DETAILS:    Discharge planning discussed with[de-identified] Zackery Weir and patient  Freedom of Choice: Yes  Comments - [place] of Choice: Missy Zambrano is choice. Reserved in [address] Contacts  Patient Contacts: Jackeline Layman  Relationship to Patient[de-identified] Family  Contact Method: In Person  Reason/Outcome: Discharge Planning       Other Referral/Resources/Interventions Provided:  Referral Comments: [facility] is choice, reserved in [place]. CM requested NPI and Provider NPI from facility for auth. Pending.

## 2023-11-24 NOTE — PROGRESS NOTES
[address] [facility]  Progress Note  Name: Jenn Herzog  MRN: [id number]  Unit/Bed#: [facility]PHP 710-01 I Date of Admission: 11/18/2023   Date of Service: 11/24/2023 I Hospital Day: 6    Assessment/Plan   Metabolic encephalopathy  Assessment & Plan  With metabolic encephalopathy on admission likely secondary to acute CVA as noted on MRI imaging, continue on dual antiplatelet therapy, lower suspicion for urinary tract infection we will continue to monitor off antibiotics    Hyperlipidemia  Assessment & Plan  Patient reportedly intolerant to statins, but in review of her recent meds she does not appear to have trialed too many low intensity statins, discussed with patient and family at bedside and will try lowest dose pravastatin at 10 mg daily at this time    DM2 (diabetes mellitus, type 2) ([facility])  Assessment & Plan    Lab Results   Component Value Date    HGBA1C 7.0 (H) 11/19/2023   Sliding-scale insulin    Rheumatoid arthritis ([address])  Assessment & Plan  Continue prednisone 5 mg daily    CHF (congestive heart failure) ([address])  Assessment & Plan  Wt Readings from Last 3 Encounters:   11/20/23 73.9 kg (163 lb)   11/19/23 73.9 kg (163 lb)   09/05/23 72.6 kg (160 lb)       Continue with Plavix, Imdur, Lopressor and torsemide 40 mg am and 40mg pm  Patient slightly hypervolemic appearing today  Patient followed by palliative care as an outpatient with [place] [facility] at [address] [facility] 4 chronic kidney disease [facility])  Assessment & Plan  Lab Results   Component Value Date    EGFR 34 11/23/2023    EGFR 27 11/22/2023    EGFR 24 11/21/2023    CREATININE 1.35 (H) 11/23/2023    CREATININE 1.66 (H) 11/22/2023    CREATININE 1.80 (H) 11/21/2023   Monitor renal function  Slightly incrased today but within baseline in setting of ckd  Increase torsemide to 40mg evening dose due to lower extremity edema, crackles on exam  Cr and volume status improving on torsemide 20 and 40 mg in the p.m.   creatinine 1.35 today on 11/23 improving on increased diuretic dose    SLE (systemic lupus erythematosus) ([address])  Assessment & Plan  Currently on prednisone. Daughter confirms that she is taking 5 mg daily    Hypothyroidism  Assessment & Plan  Levothyroxine 100 daily. Patient previously was taking 112 which was changed to 100. We will continue with levothyroxine at 100 mcg daily    * CVA (cerebral vascular accident) [facility])  Assessment & Plan  Patient had prehospital stroke alert initiated. Admitted under stroke pathway  MRI with "Acute lacunar infarct in the right centrum semiovale."  Patient with improving strength in RUE, but continues to have some weakness  Continue stroke pathway  Appreciate neuro recommendations  Placement to SNF    PT/ OT      Lethargy-resolved as of 11/20/2023  Assessment & Plan  MRI reviewed  Patient started on Rocephin empirically for possible urinary tract infection-now discontinued  Follow-up on final cultures  Continue with stroke pathway work-up as above. PT/OT               VTE Pharmacologic Prophylaxis:   Low Risk (Score 0-2) - Encourage Ambulation. Mobility:   Basic Mobility Inpatient Raw Score: 6  -[facility] Goal: 2: Bed activities/Dependent transfer  -HLM Achieved: 3: Sit at edge of bed  HLM Goal achieved. Continue to encourage appropriate mobility. Patient Centered Rounds: I performed bedside rounds with nursing staff today. Discussions with Specialists or Other Care Team Provider: n/a    Education and Discussions with Family / Patient: Updated  (daughter) at bedside. Total Time Spent on Date of Encounter in care of patient: 35 mins.  This time was spent on one or more of the following: performing physical exam; counseling and coordination of care; obtaining or reviewing history; documenting in the medical record; reviewing/ordering tests, medications or procedures; communicating with other healthcare professionals and discussing with patient's family/caregivers. Current Length of Stay: 6 day(s)  Current Patient Status: Inpatient   Certification Statement: The patient will continue to require additional inpatient hospital stay due to placement to snf  Discharge Plan: Anticipate discharge in 24-48 hrs to snf rehab    Code Status: Level 3 - DNAR and DNI    Subjective:   Patient denies any acute complaints today    Objective:     Vitals:   Temp (24hrs), Av.2 °F (36.8 °C), Min:98.2 °F (36.8 °C), Max:98.3 °F (36.8 °C)    Temp:  [98.2 °F (36.8 °C)-98.3 °F (36.8 °C)] 98.2 °F (36.8 °C)  HR:  [69-76] 69  BP: (121-132)/(60-62) 126/60  SpO2:  [91 %-94 %] 94 %  Body mass index is 32.92 kg/m². Input and Output Summary (last 24 hours): Intake/Output Summary (Last 24 hours) at 2023 1254  Last data filed at 2023 1301  Gross per 24 hour   Intake 240 ml   Output --   Net 240 ml       Physical Exam:   Physical Exam  Vitals reviewed. Constitutional:       General: She is not in acute distress. Appearance: She is obese. She is not ill-appearing. HENT:      Head: Normocephalic. Mouth/Throat:      Mouth: Mucous membranes are moist.   Eyes:      General: No scleral icterus. Extraocular Movements: Extraocular movements intact. Cardiovascular:      Rate and Rhythm: Normal rate and regular rhythm. Pulses: Normal pulses. Heart sounds: No murmur heard. No friction rub. No gallop. Pulmonary:      Effort: Pulmonary effort is normal. No respiratory distress. Breath sounds: No wheezing, rhonchi or rales. Abdominal:      General: Abdomen is flat. Bowel sounds are normal. There is no distension. Palpations: Abdomen is soft. Tenderness: There is no abdominal tenderness. There is no guarding or rebound. Musculoskeletal:      Right lower leg: No edema. Left lower leg: No edema. Skin:     General: Skin is warm. Capillary Refill: Capillary refill takes less than 2 seconds.       Coloration: Skin is not jaundiced. Findings: No rash. Neurological:      General: No focal deficit present. Mental Status: She is alert and oriented to person, place, and time. Sensory: No sensory deficit. Motor: Weakness present. Comments: LLE 2/5 strength   Psychiatric:         Mood and Affect: Mood normal.         Behavior: Behavior normal.          Additional Data:     Labs:  Results from last 7 days   Lab Units 11/23/23  0641   WBC Thousand/uL 5.93   HEMOGLOBIN g/dL 10.0*   HEMATOCRIT % 32.6*   PLATELETS Thousands/uL 151   NEUTROS PCT % 55   LYMPHS PCT % 34   MONOS PCT % 7   EOS PCT % 1     Results from last 7 days   Lab Units 11/23/23  0641 11/20/23  0513 11/19/23  0844   SODIUM mmol/L 140   < > 138   POTASSIUM mmol/L 4.2   < > 3.9   CHLORIDE mmol/L 97   < > 93*   CO2 mmol/L 32   < > 33*   BUN mg/dL 52*   < > 58*   CREATININE mg/dL 1.35*   < > 1.52*   ANION GAP mmol/L 11   < > 12   CALCIUM mg/dL 8.6   < > 9.1   ALBUMIN g/dL  --   --  3.7   TOTAL BILIRUBIN mg/dL  --   --  0.38   ALK PHOS U/L  --   --  64   ALT U/L  --   --  17   AST U/L  --   --  20   GLUCOSE RANDOM mg/dL 69   < > 199*    < > = values in this interval not displayed.      Results from last 7 days   Lab Units 11/18/23  1611   INR  0.94     Results from last 7 days   Lab Units 11/24/23  1150 11/24/23  0631 11/24/23  0155 11/23/23  2053 11/23/23  1626 11/23/23  1114 11/23/23  0633 11/23/23  0209 11/22/23  2134 11/22/23  1606 11/22/23  1113 11/22/23  0626   POC GLUCOSE mg/dl 424* 122 171* 297* 484* 314* 80 184* 323* 235* 216* 141*     Results from last 7 days   Lab Units 11/19/23  0844   HEMOGLOBIN A1C % 7.0*     Results from last 7 days   Lab Units 11/18/23  1520   PROCALCITONIN ng/ml 0.10       Lines/Drains:  Invasive Devices       Peripheral Intravenous Line  Duration             Peripheral IV 11/22/23 Left;Ventral (anterior) Forearm 2 days              Drain  Duration             External Urinary Catheter 553 days Imaging: No pertinent imaging reviewed. Recent Cultures (last 7 days):   Results from last 7 days   Lab Units 11/18/23  1805 11/18/23  1612   BLOOD CULTURE  No Growth After 5 Days. No Growth After 5 Days.   --    URINE CULTURE   --  >100,000 cfu/ml Aerococcus urinae*  80,000-89,000 cfu/ml Escherichia coli*       Last 24 Hours Medication List:   Current Facility-Administered Medications   Medication Dose Route Frequency Provider Last Rate    albuterol  2 puff Inhalation Q4H PRN Hetul Shine, DO      allopurinol  150 mg Oral Daily Hetul Shine, DO      aspirin  81 mg Oral Daily Aun Sanket      calcium carbonate  1,000 mg Oral Daily PRN Hetul Shine, DO      clopidogrel  75 mg Oral Daily Hetul Shine, DO      colchicine  0.6 mg Oral Every Other Day Hetul Shine, DO      vitamin B-12  1,000 mcg Oral Daily Blake Banai, DO      Diclofenac Sodium  2 g Topical 4x Daily [facility]      gabapentin  300 mg Oral BID Hetul Shine, DO      heparin (porcine)  5,000 Units Subcutaneous Q8H [facility] Hetul Shine, DO      HYDROcodone-acetaminophen  1 tablet Oral Q6H PRN [facility]      insulin lispro  1-5 Units Subcutaneous TID AC NATHEN Brown      insulin lispro  1-5 Units Subcutaneous HS Orien Pleva, CRNP      insulin lispro  1-5 Units Subcutaneous 0200 Orien Pleva, CRNP      insulin lispro  4 Units Subcutaneous TID With Meals Blake Banai, DO      isosorbide mononitrate  30 mg Oral Daily Hetul Shine, DO      levothyroxine  100 mcg Oral Early Morning Hetul Shine, DO      methocarbamol  750 mg Oral Q6H PRN Hetul Shine, DO      metoprolol tartrate  25 mg Oral Q12H [facility] Hetul Shine, DO      ondansetron  4 mg Intravenous Q6H PRN Hetul Shine, DO      polyethylene glycol  17 g Oral Daily Hetul Shine, DO      pravastatin  10 mg Oral Daily With Dinner [facility]      predniSONE  5 mg Oral Daily Hetul Shine, DO      senna  8.6 mg Oral Daily Hetul Shine, DO      sertraline  25 mg Oral Daily Hetul Shine,       torsemide  40 mg Oral Daily Blake Solorio DO      [START ON 11/25/2023] torsemide  40 mg Oral Daily Blake Solorio DO          Today, Patient Was Seen By: Rubi Banda DO    **Please Note: This note may have been constructed using a voice recognition system. **

## 2023-11-24 NOTE — PLAN OF CARE
Problem: PAIN - ADULT  Goal: Verbalizes/displays adequate comfort level or baseline comfort level  Description: Interventions:  - Encourage patient to monitor pain and request assistance  - Assess pain using appropriate pain scale  - Administer analgesics based on type and severity of pain and evaluate response  - Implement non-pharmacological measures as appropriate and evaluate response  - Consider cultural and social influences on pain and pain management  - Notify physician/advanced practitioner if interventions unsuccessful or patient reports new pain  Outcome: Progressing     Problem: SAFETY ADULT  Goal: Patient will remain free of falls  Description: INTERVENTIONS:  - Educate patient/family on patient safety including physical limitations  - Instruct patient to call for assistance with activity   - Consult OT/PT to assist with strengthening/mobility   - Keep Call bell within reach  - Keep bed low and locked with side rails adjusted as appropriate  - Keep care items and personal belongings within reach  - Initiate and maintain comfort rounds  - Make Fall Risk Sign visible to staff  - Offer Toileting every 3 Hours, in advance of need  - Initiate/Maintain bed alarm  - Obtain necessary fall risk management equipment:   - Apply yellow socks and bracelet for high fall risk patients  - Consider moving patient to room near nurses station  Outcome: Progressing     Problem: NEUROSENSORY - ADULT  Goal: Achieves stable or improved neurological status  Description: INTERVENTIONS  - Monitor and report changes in neurological status  - Monitor vital signs such as temperature, blood pressure, glucose, and any other labs ordered   - Initiate measures to prevent increased intracranial pressure  - Monitor for seizure activity and implement precautions if appropriate      Outcome: Progressing  Goal: Remains free of injury related to seizures activity  Description: INTERVENTIONS  - Maintain airway, patient safety  and administer oxygen as ordered  - Monitor patient for seizure activity, document and report duration and description of seizure to physician/advanced practitioner  - If seizure occurs,  ensure patient safety during seizure  - Reorient patient post seizure  - Seizure pads on all 4 side rails  - Instruct patient/family to notify RN of any seizure activity including if an aura is experienced  - Instruct patient/family to call for assistance with activity based on nursing assessment  - Administer anti-seizure medications if ordered    Outcome: Progressing  Goal: Achieves maximal functionality and self care  Description: INTERVENTIONS  - Monitor swallowing and airway patency with patient fatigue and changes in neurological status  - Encourage and assist patient to increase activity and self care.    - Encourage visually impaired, hearing impaired and aphasic patients to use assistive/communication devices  Outcome: Progressing     Problem: MUSCULOSKELETAL - ADULT  Goal: Maintain or return mobility to safest level of function  Description: INTERVENTIONS:  - Assess patient's ability to carry out ADLs; assess patient's baseline for ADL function and identify physical deficits which impact ability to perform ADLs (bathing, care of mouth/teeth, toileting, grooming, dressing, etc.)  - Assess/evaluate cause of self-care deficits   - Assess range of motion  - Assess patient's mobility  - Assess patient's need for assistive devices and provide as appropriate  - Encourage maximum independence but intervene and supervise when necessary  - Involve family in performance of ADLs  - Assess for home care needs following discharge   - Consider OT consult to assist with ADL evaluation and planning for discharge  - Provide patient education as appropriate  Outcome: Progressing

## 2023-11-24 NOTE — ASSESSMENT & PLAN NOTE
Levothyroxine 100 daily. Patient previously was taking 112 which was changed to 100.   We will continue with levothyroxine at 100 mcg daily Passed

## 2023-11-24 NOTE — PROGRESS NOTES
Patient:    MRN:  [id number]    Mago Request ID:  [id number]    Level of care reserved:  [address]    Partner Reserved:  HELEN Brody, [address] [place] [address] [phone]    Clinical needs requested:    Geography searched:  10 miles around [address] [place] [address] & [facility]    Start of Service:    Request sent:  12:42pm EST on 11/22/2023 by Pk Soto    Partner reserved:  1:14pm EST on 11/24/2023 by Pk Soto    Choice list shared:

## 2023-11-24 NOTE — ASSESSMENT & PLAN NOTE
With metabolic encephalopathy on admission likely secondary to acute CVA as noted on MRI imaging, continue on dual antiplatelet therapy, lower suspicion for urinary tract infection we will continue to monitor off antibiotics

## 2023-11-24 NOTE — ASSESSMENT & PLAN NOTE
Wt Readings from Last 3 Encounters:   11/20/23 73.9 kg (163 lb)   11/19/23 73.9 kg (163 lb)   09/05/23 72.6 kg (160 lb)       Continue with Plavix, Imdur, Lopressor and torsemide 40 mg am and 40mg pm  Patient slightly hypervolemic appearing today  Patient followed by palliative care as an outpatient with [place] care at [facility]

## 2023-11-25 LAB
ANION GAP SERPL CALCULATED.3IONS-SCNC: 9 MMOL/L
BASOPHILS # BLD AUTO: 0.03 THOUSANDS/ÂΜL (ref 0–0.1)
BASOPHILS NFR BLD AUTO: 1 % (ref 0–1)
BUN SERPL-MCNC: 56 MG/DL (ref 5–25)
CALCIUM SERPL-MCNC: 9 MG/DL (ref 8.4–10.2)
CHLORIDE SERPL-SCNC: 96 MMOL/L (ref 96–108)
CO2 SERPL-SCNC: 34 MMOL/L (ref 21–32)
CREAT SERPL-MCNC: 1.67 MG/DL (ref 0.6–1.3)
EOSINOPHIL # BLD AUTO: 0.04 THOUSAND/ÂΜL (ref 0–0.61)
EOSINOPHIL NFR BLD AUTO: 1 % (ref 0–6)
ERYTHROCYTE [DISTWIDTH] IN BLOOD BY AUTOMATED COUNT: 15.5 % (ref 11.6–15.1)
GFR SERPL CREATININE-BSD FRML MDRD: 26 ML/MIN/1.73SQ M
GLUCOSE SERPL-MCNC: 115 MG/DL (ref 65–140)
GLUCOSE SERPL-MCNC: 150 MG/DL (ref 65–140)
GLUCOSE SERPL-MCNC: 167 MG/DL (ref 65–140)
GLUCOSE SERPL-MCNC: 86 MG/DL (ref 65–140)
GLUCOSE SERPL-MCNC: 92 MG/DL (ref 65–140)
HCT VFR BLD AUTO: 31.7 % (ref 34.8–46.1)
HGB BLD-MCNC: 10.1 G/DL (ref 11.5–15.4)
IMM GRANULOCYTES # BLD AUTO: 0.12 THOUSAND/UL (ref 0–0.2)
IMM GRANULOCYTES NFR BLD AUTO: 2 % (ref 0–2)
LYMPHOCYTES # BLD AUTO: 2.29 THOUSANDS/ÂΜL (ref 0.6–4.47)
LYMPHOCYTES NFR BLD AUTO: 39 % (ref 14–44)
MCH RBC QN AUTO: 34.8 PG (ref 26.8–34.3)
MCHC RBC AUTO-ENTMCNC: 31.9 G/DL (ref 31.4–37.4)
MCV RBC AUTO: 109 FL (ref 82–98)
MONOCYTES # BLD AUTO: 0.45 THOUSAND/ÂΜL (ref 0.17–1.22)
MONOCYTES NFR BLD AUTO: 8 % (ref 4–12)
NEUTROPHILS # BLD AUTO: 2.99 THOUSANDS/ÂΜL (ref 1.85–7.62)
NEUTS SEG NFR BLD AUTO: 49 % (ref 43–75)
NRBC BLD AUTO-RTO: 2 /100 WBCS
PLATELET # BLD AUTO: 145 THOUSANDS/UL (ref 149–390)
PMV BLD AUTO: 10.2 FL (ref 8.9–12.7)
POTASSIUM SERPL-SCNC: 4.2 MMOL/L (ref 3.5–5.3)
RBC # BLD AUTO: 2.9 MILLION/UL (ref 3.81–5.12)
SODIUM SERPL-SCNC: 139 MMOL/L (ref 135–147)
WBC # BLD AUTO: 5.92 THOUSAND/UL (ref 4.31–10.16)

## 2023-11-25 PROCEDURE — 80048 BASIC METABOLIC PNL TOTAL CA: CPT | Performed by: INTERNAL MEDICINE

## 2023-11-25 PROCEDURE — 99232 SBSQ HOSP IP/OBS MODERATE 35: CPT | Performed by: INTERNAL MEDICINE

## 2023-11-25 PROCEDURE — 82948 REAGENT STRIP/BLOOD GLUCOSE: CPT

## 2023-11-25 PROCEDURE — 85025 COMPLETE CBC W/AUTO DIFF WBC: CPT | Performed by: INTERNAL MEDICINE

## 2023-11-25 RX ORDER — TORSEMIDE 20 MG/1
20 TABLET ORAL DAILY
Status: DISCONTINUED | OUTPATIENT
Start: 2023-11-26 | End: 2023-11-28 | Stop reason: HOSPADM

## 2023-11-25 RX ORDER — TORSEMIDE 20 MG/1
40 TABLET ORAL DAILY
Status: DISCONTINUED | OUTPATIENT
Start: 2023-11-25 | End: 2023-11-28 | Stop reason: HOSPADM

## 2023-11-25 RX ORDER — TORSEMIDE 20 MG/1
20 TABLET ORAL DAILY
Status: DISCONTINUED | OUTPATIENT
Start: 2023-11-25 | End: 2023-11-25

## 2023-11-25 RX ADMIN — ASPIRIN 81 MG CHEWABLE TABLET 81 MG: 81 TABLET CHEWABLE at 09:13

## 2023-11-25 RX ADMIN — Medication 2 G: at 17:03

## 2023-11-25 RX ADMIN — INSULIN LISPRO 4 UNITS: 100 INJECTION, SOLUTION INTRAVENOUS; SUBCUTANEOUS at 09:11

## 2023-11-25 RX ADMIN — LEVOTHYROXINE SODIUM 100 MCG: 100 TABLET ORAL at 06:14

## 2023-11-25 RX ADMIN — TORSEMIDE 40 MG: 20 TABLET ORAL at 09:11

## 2023-11-25 RX ADMIN — SERTRALINE 25 MG: 25 TABLET, FILM COATED ORAL at 09:13

## 2023-11-25 RX ADMIN — Medication 2 G: at 12:02

## 2023-11-25 RX ADMIN — TORSEMIDE 40 MG: 20 TABLET ORAL at 14:18

## 2023-11-25 RX ADMIN — GABAPENTIN 300 MG: 300 CAPSULE ORAL at 23:17

## 2023-11-25 RX ADMIN — Medication 2 G: at 09:22

## 2023-11-25 RX ADMIN — SENNOSIDES 8.6 MG: 8.6 TABLET, FILM COATED ORAL at 09:13

## 2023-11-25 RX ADMIN — HYDROCODONE BITARTRATE AND ACETAMINOPHEN 1 TABLET: 5; 325 TABLET ORAL at 23:17

## 2023-11-25 RX ADMIN — HEPARIN SODIUM 5000 UNITS: 5000 INJECTION INTRAVENOUS; SUBCUTANEOUS at 14:18

## 2023-11-25 RX ADMIN — HYDROCODONE BITARTRATE AND ACETAMINOPHEN 1 TABLET: 5; 325 TABLET ORAL at 14:21

## 2023-11-25 RX ADMIN — METOPROLOL TARTRATE 25 MG: 25 TABLET, FILM COATED ORAL at 09:14

## 2023-11-25 RX ADMIN — ALLOPURINOL 150 MG: 300 TABLET ORAL at 09:13

## 2023-11-25 RX ADMIN — CLOPIDOGREL BISULFATE 75 MG: 75 TABLET ORAL at 09:11

## 2023-11-25 RX ADMIN — INSULIN LISPRO 1 UNITS: 100 INJECTION, SOLUTION INTRAVENOUS; SUBCUTANEOUS at 17:02

## 2023-11-25 RX ADMIN — INSULIN LISPRO 4 UNITS: 100 INJECTION, SOLUTION INTRAVENOUS; SUBCUTANEOUS at 12:02

## 2023-11-25 RX ADMIN — METOPROLOL TARTRATE 25 MG: 25 TABLET, FILM COATED ORAL at 23:18

## 2023-11-25 RX ADMIN — PRAVASTATIN SODIUM 10 MG: 10 TABLET ORAL at 17:01

## 2023-11-25 RX ADMIN — GABAPENTIN 300 MG: 300 CAPSULE ORAL at 09:11

## 2023-11-25 RX ADMIN — PREDNISONE 5 MG: 5 TABLET ORAL at 09:13

## 2023-11-25 RX ADMIN — INSULIN LISPRO 1 UNITS: 100 INJECTION, SOLUTION INTRAVENOUS; SUBCUTANEOUS at 23:19

## 2023-11-25 RX ADMIN — HEPARIN SODIUM 5000 UNITS: 5000 INJECTION INTRAVENOUS; SUBCUTANEOUS at 06:14

## 2023-11-25 RX ADMIN — INSULIN LISPRO 4 UNITS: 100 INJECTION, SOLUTION INTRAVENOUS; SUBCUTANEOUS at 17:02

## 2023-11-25 RX ADMIN — METHOCARBAMOL 750 MG: 750 TABLET ORAL at 23:17

## 2023-11-25 RX ADMIN — ISOSORBIDE MONONITRATE 30 MG: 30 TABLET, EXTENDED RELEASE ORAL at 09:11

## 2023-11-25 RX ADMIN — CYANOCOBALAMIN TAB 500 MCG 1000 MCG: 500 TAB at 09:13

## 2023-11-25 RX ADMIN — HEPARIN SODIUM 5000 UNITS: 5000 INJECTION INTRAVENOUS; SUBCUTANEOUS at 23:18

## 2023-11-25 NOTE — CASE MANAGEMENT
Case Management Discharge Planning Note    Patient name Chantel Martinez  Location [address] [place] [address] [address] 710-01 MRN [id number]  : 1934 Date 2023       Current Admission Date: 2023  Current Admission Diagnosis:CVA (cerebral vascular accident) [facility])   Patient Active Problem List    Diagnosis Date Noted    Metabolic encephalopathy     CVA (cerebral vascular accident) ([address]) 2023    Goals of care, counseling/discussion 2023    Fall 2023    Pressure injury of deep tissue 2023    Pancreatic lesion 2023    Rheumatoid arthritis ([address]) 2023    DM2 (diabetes mellitus, type 2) ([address]) 2023    History of renal artery stenosis 2023    Family history of gout 2023    Peripheral polyneuropathy 2023    Hypertension 2023    Lupus ([address]) 2023    Hyperlipidemia 2023    History of stroke 2023    Hemorrhoids 2023    Type 2 diabetes mellitus with diabetic neuropathy, without long-term current use of insulin ([address]) 02/15/2023    Primary hypertension 02/15/2023    Class 1 obesity due to excess calories without serious comorbidity with body mass index (BMI) of 33.0 to 33.9 in adult 02/15/2023    Thoracic spine fracture ([address]) 2023    Perineal hematoma 2023    Cervical spine fracture ([address]) 02/10/2023    Contusion 02/10/2023    CHF (congestive heart failure) ([address]) 02/10/2023    CKD (chronic kidney disease) stage 4, GFR 15-29 ml/min ([address]) 02/10/2023    Hypothyroidism 02/10/2023    Fall 2023    Ambulatory dysfunction 2022    Chronic midline low back pain without sciatica 2022    COVID-19 2022    Fall 2022    Fall 2022    Hypothyroidism 2022    DM type 2 (diabetes mellitus, type 2) ([address]) 2022    AZUL (obstructive sleep apnea) 2022    Essential hypertension 2022    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 ([address]) 2022    Renal artery stenosis ([address]) 2022    Lab test positive for detection of COVID-19 virus 05/19/2022    Recurrent UTI 09/28/2021    Multiple drug resistant organism (MDRO) culture positive 09/28/2021    HTN (hypertension), benign 12/14/2020    Chronic gout without tophus 71/71/3603    Toxic metabolic encephalopathy 34/55/4199    Aspiration pneumonia due to regurgitated food ([address]) 09/29/2020    Macrocytic anemia 09/29/2020    Nephrosclerosis arteriolar, stage 1-4 or unspecified chronic kidney disease 05/29/2020    Encounter for follow-up examination after completed treatment for malignant neoplasm 05/19/2020    Seasonal allergic rhinitis due to pollen 03/24/2020    Wheezing 11/04/2019    Stage 4 chronic kidney disease ([address]) 10/29/2019    Alkalosis 10/29/2019    Chronic respiratory failure with hypoxia ([address]) 10/21/2019    Chronic anemia 10/19/2019    SDH (subdural hematoma) ([address]) 10/19/2019    AZUL (obstructive sleep apnea) 10/19/2019    H/O spinal fusion 07/23/2019    Hyponatremia 01/21/2019    Chronic combined systolic and diastolic heart failure ([address]) 01/17/2019    Depression 01/17/2019    Essential hypertension 01/09/2019    Controlled type 2 diabetes mellitus with diabetic neuropathy, without long-term current use of insulin ([address]) 10/24/2017    SLE (systemic lupus erythematosus) ([address]) 10/24/2017    Vitamin D deficiency 08/22/2017    Renal artery stenosis ([address]) 02/08/2017    Peripheral polyneuropathy 02/07/2017    Hypothyroidism 09/23/2016    H/O: CVA (cerebrovascular accident) 09/23/2016    History of malignant neoplasm of breast 09/23/2016    History of aortic valve replacement 07/21/2015    RA (rheumatoid arthritis) ([address]) 07/21/2015    Hyperlipidemia 10/01/2013    Osteoporosis 10/01/2013    Obesity (BMI 30.0-34.9) 10/12/2012      LOS (days): 7  Geometric Mean LOS (GMLOS) (days): 2.90  Days to GMLOS:-3.9     OBJECTIVE:  Risk of Unplanned Readmission Score: 48.07         Current admission status: Inpatient   Preferred Pharmacy:   [id number] [place] - [address]  [address] [place] [address]  Phone: [phone] Fax: [facility], [address]  [facility] [address]  Phone: [phone] Fax: [phone]    [address] [place] [address], [address] [place] [address]  Phone: [phone] Fax: [phone]    PATIENT/FAMILY REPORTS NO PREFERRED PHARMACY  No address on file      Primary Care Provider: Kaela Siegel DO    Primary Insurance: Artie Gurrola [facility]  Secondary Insurance:     DISCHARGE DETAILS:      Other Referral/Resources/Interventions Provided:  Referral Comments:  Manoj Richter has been submitted for [facility]

## 2023-11-25 NOTE — ASSESSMENT & PLAN NOTE
Of note, patient is followed by [facility] palliative care. Note patient's outpatient notes. Confirmed with granddaughter that she is a level 3 DNR. Lisa Dean She has been followed by [facility] palliative care due to history of heart failure. We will continue with aggressive medical management and work-up for stroke. Family is agreeable to stroke pathway work-up and recommendations. Patient presents with left-sided weakness which is gradually improving on presentation to the ED.

## 2023-11-25 NOTE — ASSESSMENT & PLAN NOTE
Lab Results   Component Value Date    EGFR 26 11/25/2023    EGFR 34 11/23/2023    EGFR 27 11/22/2023    CREATININE 1.67 (H) 11/25/2023    CREATININE 1.35 (H) 11/23/2023    CREATININE 1.66 (H) 11/22/2023   Monitor renal function    Increase torsemide to 40mg evening dose due to lower extremity edema, crackles on exam  Cr and volume status improving on torsemide 40 in the am and 20 mg in the p.m.  creat1.67 today within baseline

## 2023-11-25 NOTE — ASSESSMENT & PLAN NOTE
Wt Readings from Last 3 Encounters:   11/20/23 73.9 kg (163 lb)   11/19/23 73.9 kg (163 lb)   09/05/23 72.6 kg (160 lb)       Continue with Plavix, Imdur, Lopressor and torsemide 40 mg am and 20mg pm  Patient slightly hypervolemic appearing today  Patient followed by palliative care as an outpatient with [place] care at [facility]

## 2023-11-25 NOTE — PROGRESS NOTES
[address] [facility]  Progress Note  Name: Maynor Umana  MRN: [id number]  Unit/Bed#: [facility] 710-01 I Date of Admission: 11/18/2023   Date of Service: 11/25/2023 I Hospital Day: 7    Assessment/Plan   Metabolic encephalopathy  Assessment & Plan  With metabolic encephalopathy on admission likely secondary to acute CVA as noted on MRI imaging, continue on dual antiplatelet therapy, lower suspicion for urinary tract infection we will continue to monitor off antibiotics    Goals of care, counseling/discussion  Assessment & Plan  Of note, patient is followed by [facility] palliative care. Note patient's outpatient notes. Confirmed with granddaughter that she is a level 3 DNR. Juan Franciscoana paula Pedro She has been followed by [facility] care due to history of heart failure. We will continue with aggressive medical management and work-up for stroke. Family is agreeable to stroke pathway work-up and recommendations. Patient presents with left-sided weakness which is gradually improving on presentation to the ED.     Hyperlipidemia  Assessment & Plan  Patient reportedly intolerant to statins, but in review of her recent meds she does not appear to have trialed too many low intensity statins, discussed with patient and family at bedside and will try lowest dose pravastatin at 10 mg daily at this time    DM2 (diabetes mellitus, type 2) ([facility])  Assessment & Plan    Lab Results   Component Value Date    HGBA1C 7.0 (H) 11/19/2023   Sliding-scale insulin    Rheumatoid arthritis ([address])  Assessment & Plan  Continue prednisone 5 mg daily    CHF (congestive heart failure) ([address])  Assessment & Plan  Wt Readings from Last 3 Encounters:   11/20/23 73.9 kg (163 lb)   11/19/23 73.9 kg (163 lb)   09/05/23 72.6 kg (160 lb)       Continue with Plavix, Imdur, Lopressor and torsemide 40 mg am and 20mg pm  Patient slightly hypervolemic appearing today  Patient followed by palliative care as an outpatient with [facility]        Stage 4 chronic kidney disease [facility])  Assessment & Plan  Lab Results   Component Value Date    EGFR 26 11/25/2023    EGFR 34 11/23/2023    EGFR 27 11/22/2023    CREATININE 1.67 (H) 11/25/2023    CREATININE 1.35 (H) 11/23/2023    CREATININE 1.66 (H) 11/22/2023   Monitor renal function    Increase torsemide to 40mg evening dose due to lower extremity edema, crackles on exam  Cr and volume status improving on torsemide 40 in the am and 20 mg in the p.m.  creat1.67 today within baseline      SLE (systemic lupus erythematosus) ([address])  Assessment & Plan  Currently on prednisone. Daughter confirms that she is taking 5 mg daily    Hypothyroidism  Assessment & Plan  Levothyroxine 100 daily. Patient previously was taking 112 which was changed to 100. We will continue with levothyroxine at 100 mcg daily    * CVA (cerebral vascular accident) [facility])  Assessment & Plan  Patient had prehospital stroke alert initiated. Admitted under stroke pathway  MRI with "Acute lacunar infarct in the right centrum semiovale."  Patient with improving strength in RUE, but continues to have some weakness  Continue stroke pathway  Appreciate neuro recommendations  Placement to SNF    PT/ OT      Lethargy-resolved as of 11/20/2023  Assessment & Plan  MRI reviewed  Patient started on Rocephin empirically for possible urinary tract infection-now discontinued  Follow-up on final cultures  Continue with stroke pathway work-up as above. PT/OT           VTE Pharmacologic Prophylaxis:   High Risk (Score >/= 5) - Pharmacological DVT Prophylaxis Ordered: heparin. Sequential Compression Devices Ordered. Mobility:   Basic Mobility Inpatient Raw Score: 6  -HL Goal: 2: Bed activities/Dependent transfer  JH-HLM Achieved: 3: Sit at edge of bed  HLM Goal achieved. Continue to encourage appropriate mobility. Patient Centered Rounds: I performed bedside rounds with nursing staff today.    Discussions with Specialists or Other Care Team Provider: n/a    Education and Discussions with Family / Patient: Patient declined call to . Total Time Spent on Date of Encounter in care of patient: 35 mins. This time was spent on one or more of the following: performing physical exam; counseling and coordination of care; obtaining or reviewing history; documenting in the medical record; reviewing/ordering tests, medications or procedures; communicating with other healthcare professionals and discussing with patient's family/caregivers. Current Length of Stay: 7 day(s)  Current Patient Status: Inpatient   Certification Statement: The patient will continue to require additional inpatient hospital stay due to pending placement  Discharge Plan: Anticipate discharge in 24-48 hrs to rehab facility. Code Status: Level 3 - DNAR and DNI    Subjective:   Patient denies any acute complaints today    Objective:     Vitals:   Temp (24hrs), Av.2 °F (36.8 °C), Min:98 °F (36.7 °C), Max:98.4 °F (36.9 °C)    Temp:  [98 °F (36.7 °C)-98.4 °F (36.9 °C)] 98 °F (36.7 °C)  HR:  [62-68] 68  Resp:  [19] 19  BP: (127-158)/() 145/51  SpO2:  [92 %-96 %] 95 %  Body mass index is 32.92 kg/m². Input and Output Summary (last 24 hours):   No intake or output data in the 24 hours ending 23 1354    Physical Exam:   Physical Exam  Vitals reviewed. Constitutional:       General: She is not in acute distress. Appearance: She is obese. She is not ill-appearing. HENT:      Head: Normocephalic. Mouth/Throat:      Mouth: Mucous membranes are moist.   Eyes:      General: No scleral icterus. Extraocular Movements: Extraocular movements intact. Cardiovascular:      Rate and Rhythm: Normal rate and regular rhythm. Pulses: Normal pulses. Heart sounds: No murmur heard. No friction rub. No gallop. Pulmonary:      Effort: Pulmonary effort is normal. No respiratory distress. Breath sounds: No wheezing, rhonchi or rales. Abdominal:      General: Abdomen is flat. Bowel sounds are normal. There is no distension. Palpations: Abdomen is soft. Tenderness: There is no abdominal tenderness. There is no guarding or rebound. Musculoskeletal:      Right lower leg: No edema. Left lower leg: No edema. Skin:     General: Skin is warm. Capillary Refill: Capillary refill takes less than 2 seconds. Coloration: Skin is not jaundiced. Findings: No rash. Neurological:      General: No focal deficit present. Mental Status: She is alert and oriented to person, place, and time. Sensory: No sensory deficit. Motor: Weakness present. Comments: LLE 2/5 strength   Psychiatric:         Mood and Affect: Mood normal.         Behavior: Behavior normal.          Additional Data:     Labs:  Results from last 7 days   Lab Units 11/25/23  0628   WBC Thousand/uL 5.92   HEMOGLOBIN g/dL 10.1*   HEMATOCRIT % 31.7*   PLATELETS Thousands/uL 145*   NEUTROS PCT % 49   LYMPHS PCT % 39   MONOS PCT % 8   EOS PCT % 1     Results from last 7 days   Lab Units 11/25/23  0628 11/20/23  0513 11/19/23  0844   SODIUM mmol/L 139   < > 138   POTASSIUM mmol/L 4.2   < > 3.9   CHLORIDE mmol/L 96   < > 93*   CO2 mmol/L 34*   < > 33*   BUN mg/dL 56*   < > 58*   CREATININE mg/dL 1.67*   < > 1.52*   ANION GAP mmol/L 9   < > 12   CALCIUM mg/dL 9.0   < > 9.1   ALBUMIN g/dL  --   --  3.7   TOTAL BILIRUBIN mg/dL  --   --  0.38   ALK PHOS U/L  --   --  64   ALT U/L  --   --  17   AST U/L  --   --  20   GLUCOSE RANDOM mg/dL 86   < > 199*    < > = values in this interval not displayed.      Results from last 7 days   Lab Units 11/18/23  1611   INR  0.94     Results from last 7 days   Lab Units 11/25/23  1119 11/25/23  0805 11/24/23  2233 11/24/23  1558 11/24/23  1150 11/24/23  0631 11/24/23  0155 11/23/23  2053 11/23/23  1626 11/23/23  1114 11/23/23  0633 11/23/23  0209   POC GLUCOSE mg/dl 115 92 184* 186* 424* 122 171* 297* 484* 314* 80 184* Results from last 7 days   Lab Units 11/19/23  0844   HEMOGLOBIN A1C % 7.0*     Results from last 7 days   Lab Units 11/18/23  1520   PROCALCITONIN ng/ml 0.10       Lines/Drains:  Invasive Devices       Peripheral Intravenous Line  Duration             Peripheral IV 11/22/23 Left;Ventral (anterior) Forearm 3 days              Drain  Duration             External Urinary Catheter 554 days                          Imaging: No pertinent imaging reviewed. Recent Cultures (last 7 days):   Results from last 7 days   Lab Units 11/18/23  1805 11/18/23  1612   BLOOD CULTURE  No Growth After 5 Days. No Growth After 5 Days.   --    URINE CULTURE   --  >100,000 cfu/ml Aerococcus urinae*  80,000-89,000 cfu/ml Escherichia coli*       Last 24 Hours Medication List:   Current Facility-Administered Medications   Medication Dose Route Frequency Provider Last Rate    albuterol  2 puff Inhalation Q4H PRN Hetul Shine, DO      allopurinol  150 mg Oral Daily Hetul Shine, DO      aspirin  81 mg Oral Daily Aun Sanket      calcium carbonate  1,000 mg Oral Daily PRN Hetul Shine, DO      clopidogrel  75 mg Oral Daily Hetul Shine, DO      colchicine  0.6 mg Oral Every Other Day Hetul Shine, DO      vitamin B-12  1,000 mcg Oral Daily Blake Banai, DO      Diclofenac Sodium  2 g Topical 4x Daily Linwood Burt      gabapentin  300 mg Oral BID Hetul Shine, DO      heparin (porcine)  5,000 Units Subcutaneous Q8H [address] Hetul Shine, DO      HYDROcodone-acetaminophen  1 tablet Oral Q6H PRN Linwood Burt      insulin lispro  1-5 Units Subcutaneous TID AC NATHEN Brown      insulin lispro  1-5 Units Subcutaneous HS NATHEN Ovalle      insulin lispro  1-5 Units Subcutaneous 0200 NATHEN Ovalle      insulin lispro  4 Units Subcutaneous TID With Meals Blake Banai, DO      isosorbide mononitrate  30 mg Oral Daily Hetul Shine, DO      levothyroxine  100 mcg Oral Early Morning Hetul Shine, DO      methocarbamol  750 mg Oral Q6H PRN Hetul Shine, DO      metoprolol tartrate  25 mg Oral Q12H [address] Hetul Shine, DO      ondansetron  4 mg Intravenous Q6H PRN Hetul Shine, DO      polyethylene glycol  17 g Oral Daily Hetul Shine, DO      pravastatin  10 mg Oral Daily With Dinner Clif Cool      predniSONE  5 mg Oral Daily Hetul Shine, DO      senna  8.6 mg Oral Daily Hetul Shine, DO      sertraline  25 mg Oral Daily Hetul Shine, DO      [START ON 11/26/2023] torsemide  20 mg Oral Daily Blake Banai, DO      torsemide  40 mg Oral Daily Blake Banai, DO          Today, Patient Was Seen By: Che Vaughn DO    **Please Note: This note may have been constructed using a voice recognition system. **

## 2023-11-26 LAB
ANION GAP SERPL CALCULATED.3IONS-SCNC: 14 MMOL/L
BUN SERPL-MCNC: 63 MG/DL (ref 5–25)
CALCIUM SERPL-MCNC: 8.7 MG/DL (ref 8.4–10.2)
CHLORIDE SERPL-SCNC: 93 MMOL/L (ref 96–108)
CO2 SERPL-SCNC: 31 MMOL/L (ref 21–32)
CREAT SERPL-MCNC: 1.66 MG/DL (ref 0.6–1.3)
GFR SERPL CREATININE-BSD FRML MDRD: 27 ML/MIN/1.73SQ M
GLUCOSE SERPL-MCNC: 131 MG/DL (ref 65–140)
GLUCOSE SERPL-MCNC: 190 MG/DL (ref 65–140)
GLUCOSE SERPL-MCNC: 200 MG/DL (ref 65–140)
GLUCOSE SERPL-MCNC: 95 MG/DL (ref 65–140)
POTASSIUM SERPL-SCNC: 5.6 MMOL/L (ref 3.5–5.3)
SODIUM SERPL-SCNC: 138 MMOL/L (ref 135–147)

## 2023-11-26 PROCEDURE — 82948 REAGENT STRIP/BLOOD GLUCOSE: CPT

## 2023-11-26 PROCEDURE — 99232 SBSQ HOSP IP/OBS MODERATE 35: CPT | Performed by: NURSE PRACTITIONER

## 2023-11-26 PROCEDURE — 80048 BASIC METABOLIC PNL TOTAL CA: CPT | Performed by: INTERNAL MEDICINE

## 2023-11-26 RX ORDER — LIDOCAINE 50 MG/G
2 PATCH TOPICAL DAILY
Status: DISCONTINUED | OUTPATIENT
Start: 2023-11-26 | End: 2023-11-28 | Stop reason: HOSPADM

## 2023-11-26 RX ORDER — NYSTATIN 100000 [USP'U]/G
POWDER TOPICAL 3 TIMES DAILY
Status: DISCONTINUED | OUTPATIENT
Start: 2023-11-26 | End: 2023-11-28 | Stop reason: HOSPADM

## 2023-11-26 RX ADMIN — METHOCARBAMOL 750 MG: 750 TABLET ORAL at 23:35

## 2023-11-26 RX ADMIN — PREDNISONE 5 MG: 5 TABLET ORAL at 09:38

## 2023-11-26 RX ADMIN — CYANOCOBALAMIN TAB 500 MCG 1000 MCG: 500 TAB at 09:38

## 2023-11-26 RX ADMIN — POLYETHYLENE GLYCOL 3350 17 G: 17 POWDER, FOR SOLUTION ORAL at 09:39

## 2023-11-26 RX ADMIN — NYSTATIN: 100000 POWDER TOPICAL at 17:15

## 2023-11-26 RX ADMIN — ALLOPURINOL 150 MG: 300 TABLET ORAL at 09:38

## 2023-11-26 RX ADMIN — Medication 2 G: at 23:37

## 2023-11-26 RX ADMIN — SENNOSIDES 8.6 MG: 8.6 TABLET, FILM COATED ORAL at 09:38

## 2023-11-26 RX ADMIN — SERTRALINE 25 MG: 25 TABLET, FILM COATED ORAL at 09:38

## 2023-11-26 RX ADMIN — INSULIN LISPRO 4 UNITS: 100 INJECTION, SOLUTION INTRAVENOUS; SUBCUTANEOUS at 17:15

## 2023-11-26 RX ADMIN — HEPARIN SODIUM 5000 UNITS: 5000 INJECTION INTRAVENOUS; SUBCUTANEOUS at 15:05

## 2023-11-26 RX ADMIN — NYSTATIN: 100000 POWDER TOPICAL at 23:36

## 2023-11-26 RX ADMIN — Medication 2 G: at 12:22

## 2023-11-26 RX ADMIN — CLOPIDOGREL BISULFATE 75 MG: 75 TABLET ORAL at 09:38

## 2023-11-26 RX ADMIN — Medication 2 G: at 09:38

## 2023-11-26 RX ADMIN — INSULIN LISPRO 4 UNITS: 100 INJECTION, SOLUTION INTRAVENOUS; SUBCUTANEOUS at 09:38

## 2023-11-26 RX ADMIN — LEVOTHYROXINE SODIUM 100 MCG: 100 TABLET ORAL at 05:37

## 2023-11-26 RX ADMIN — INSULIN LISPRO 1 UNITS: 100 INJECTION, SOLUTION INTRAVENOUS; SUBCUTANEOUS at 23:36

## 2023-11-26 RX ADMIN — PRAVASTATIN SODIUM 10 MG: 10 TABLET ORAL at 17:15

## 2023-11-26 RX ADMIN — GABAPENTIN 300 MG: 300 CAPSULE ORAL at 09:38

## 2023-11-26 RX ADMIN — COLCHICINE 0.6 MG: 0.6 TABLET ORAL at 09:38

## 2023-11-26 RX ADMIN — TORSEMIDE 40 MG: 20 TABLET ORAL at 15:05

## 2023-11-26 RX ADMIN — INSULIN LISPRO 1 UNITS: 100 INJECTION, SOLUTION INTRAVENOUS; SUBCUTANEOUS at 09:38

## 2023-11-26 RX ADMIN — GABAPENTIN 300 MG: 300 CAPSULE ORAL at 23:34

## 2023-11-26 RX ADMIN — INSULIN LISPRO 4 UNITS: 100 INJECTION, SOLUTION INTRAVENOUS; SUBCUTANEOUS at 12:22

## 2023-11-26 RX ADMIN — Medication 2 G: at 17:15

## 2023-11-26 RX ADMIN — METOPROLOL TARTRATE 25 MG: 25 TABLET, FILM COATED ORAL at 23:35

## 2023-11-26 RX ADMIN — LIDOCAINE 5% 2 PATCH: 700 PATCH TOPICAL at 17:15

## 2023-11-26 RX ADMIN — ASPIRIN 81 MG CHEWABLE TABLET 81 MG: 81 TABLET CHEWABLE at 09:38

## 2023-11-26 RX ADMIN — HEPARIN SODIUM 5000 UNITS: 5000 INJECTION INTRAVENOUS; SUBCUTANEOUS at 23:35

## 2023-11-26 RX ADMIN — HEPARIN SODIUM 5000 UNITS: 5000 INJECTION INTRAVENOUS; SUBCUTANEOUS at 05:37

## 2023-11-26 NOTE — ASSESSMENT & PLAN NOTE
Of note, patient is followed by [facility] palliative care. Note patient's outpatient notes. Confirmed with granddaughter that she is a level 3 DNR. Jeremías Carlin She has been followed by [facility] palliative care due to history of heart failure. Family is agreeable to stroke pathway work-up and recommendations. Patient presents with left-sided weakness which is gradually improving on presentation to the ED.

## 2023-11-26 NOTE — ASSESSMENT & PLAN NOTE
Lab Results   Component Value Date    HGBA1C 7.0 (H) 11/19/2023   Known history   Continue Sliding-scale insulin and blood glucose monitoring  CC diet

## 2023-11-26 NOTE — PROGRESS NOTES
[address] [facility]  Progress Note  Name: Erlinda Renteria  MRN: [id number]  Unit/Bed#: [facility]PHP 710-01 I Date of Admission: 11/18/2023   Date of Service: 11/26/2023 I Hospital Day: 8    Assessment/Plan   * CVA (cerebral vascular accident) [facility])  Assessment & Plan  Patient had prehospital stroke alert initiated. Admitted under stroke pathway  MRI with "Acute lacunar infarct in the right centrum semiovale."  Patient with improving strength in RUE, but continues to have some weakness  Continue stroke pathway  Neurology consulted:   Recommend 21 days DAPT unless cardiology would want [facility] due to ECHO findings  Per echo patient has stable EF of 35% and patient declined LHC in the past and wants more comfort approach  Continue DAPT for 21 days  PT/OT eval completed recommend moderate resource intensity   CM for dispo planning patient agreeable to STR placement/insurance authorization pending     Metabolic encephalopathy  Assessment & Plan  With metabolic encephalopathy on admission likely secondary to acute CVA as noted on MRI imaging, continue on dual antiplatelet therapy, lower suspicion for urinary tract infection we will continue to monitor off antibiotics    Goals of care, counseling/discussion  Assessment & Plan  Of note, patient is followed by [facility] palliative care. Note patient's outpatient notes. Confirmed with granddaughter that she is a level 3 DNR. Jeannine Brown She has been followed by [facility] palliative [facility] due to history of heart failure. Family is agreeable to stroke pathway work-up and recommendations. Patient presents with left-sided weakness which is gradually improving on presentation to the ED.     Hyperlipidemia  Assessment & Plan  Patient reportedly intolerant to statins, but in review of her recent meds she does not appear to have trialed too many low intensity statins, previous provider discussed with patient and family at bedside and will try lowest dose pravastatin at 10 mg daily at this time    DM2 (diabetes mellitus, type 2) ([facility])  Assessment & Plan    Lab Results   Component Value Date    HGBA1C 7.0 (H) 11/19/2023   Known history   Continue Sliding-scale insulin and blood glucose monitoring  CC diet     Rheumatoid arthritis ([facility])  Assessment & Plan  Continue prednisone 5 mg daily  Patient on lidoderm patches at home 2 daily to each shoulder. Discussed with daughter would continue at discharge for improved mobility at discharge     Stage 4 chronic kidney disease [facility])  Assessment & Plan  Lab Results   Component Value Date    EGFR 27 11/26/2023    EGFR 26 11/25/2023    EGFR 34 11/23/2023    CREATININE 1.66 (H) 11/26/2023    CREATININE 1.67 (H) 11/25/2023    CREATININE 1.35 (H) 11/23/2023   Known history of CKD baseline cr 1.4 -1.6  Cr appears to be at baseline     Increase torsemide to 40mg evening dose due to lower extremity edema, crackles on exam  Cr and volume status improving on torsemide 40 in the am and 20 mg in the p.m.  creat1.67 today within baseline      SLE (systemic lupus erythematosus) ([address])  Assessment & Plan  Currently on prednisone. Daughter confirms that she is taking 5 mg daily    Chronic combined systolic and diastolic heart failure (HCC)  Assessment & Plan  Wt Readings from Last 3 Encounters:   11/20/23 73.9 kg (163 lb)   11/19/23 73.9 kg (163 lb)   09/05/23 72.6 kg (160 lb)     Known hx with LVEF 35%  Continue torsemide 40 mg in am and 20 mg in pm   I/O, low sodium diet, and daily weights           Hypothyroidism  Assessment & Plan  Levothyroxine 100 daily. Patient previously was taking 112 which was changed to 100. Continued on levothyroxine at 100 mcg daily           VTE Pharmacologic Prophylaxis: VTE Score: 10 High Risk (Score >/= 5) - Pharmacological DVT Prophylaxis Ordered: heparin. Sequential Compression Devices Ordered.     Mobility:   Basic Mobility Inpatient Raw Score: 9  -[facility] Goal: 3: Sit at edge of bed  -HL Achieved: 3: Sit at edge of bed  HLM Goal achieved. Continue to encourage appropriate mobility. Patient Centered Rounds: I performed bedside rounds with nursing staff today. Discussions with Specialists or Other Care Team Provider: neurology notes reviewed     Education and Discussions with Family / Patient: Updated  (daughter) at bedside. Total Time Spent on Date of Encounter in care of patient: 30 mins. This time was spent on one or more of the following: performing physical exam; counseling and coordination of care; obtaining or reviewing history; documenting in the medical record; reviewing/ordering tests, medications or procedures; communicating with other healthcare professionals and discussing with patient's family/caregivers. Current Length of Stay: 8 day(s)  Current Patient Status: Inpatient   Certification Statement: The patient will continue to require additional inpatient hospital stay due to acute CVA with need for STR  Discharge Plan: Anticipate discharge tomorrow to rehab facility. Code Status: Level 3 - DNAR and DNI    Subjective:   Patient sleeping arouses easily. Patient reporting some discomfort with swelling which is typical for her open after to discussion to ace wraps to her lower extremity. Daughter at bedside asking for her at home lidoderm to both shoulders be added back in as she tends to have increased pain when she gets up and start moving especially with going to rehab. Objective:     Vitals:   Temp (24hrs), Av.9 °F (36.6 °C), Min:97.6 °F (36.4 °C), Max:98.1 °F (36.7 °C)    Temp:  [97.6 °F (36.4 °C)-98.1 °F (36.7 °C)] 97.6 °F (36.4 °C)  HR:  [57-69] 69  BP: ()/(42-59) 154/57  SpO2:  [91 %-94 %] 94 %  Body mass index is 32.92 kg/m². Input and Output Summary (last 24 hours):      Intake/Output Summary (Last 24 hours) at 2023 1511  Last data filed at 2023 0931  Gross per 24 hour   Intake 120 ml   Output --   Net 120 ml       Physical Exam:   Physical Exam  Vitals and nursing note reviewed. Constitutional:       General: She is sleeping. She is not in acute distress. Appearance: She is well-developed. HENT:      Head: Normocephalic and atraumatic. Eyes:      Conjunctiva/sclera: Conjunctivae normal.   Cardiovascular:      Rate and Rhythm: Normal rate and regular rhythm. Heart sounds: No murmur heard. Pulmonary:      Effort: Pulmonary effort is normal. No respiratory distress. Breath sounds: Normal breath sounds. Abdominal:      Palpations: Abdomen is soft. Tenderness: There is no abdominal tenderness. Musculoskeletal:         General: No swelling. Cervical back: Neck supple. Right lower leg: Edema present. Left lower leg: Edema present. Skin:     General: Skin is warm and dry. Capillary Refill: Capillary refill takes less than 2 seconds. Neurological:      Mental Status: She is easily aroused.    Psychiatric:         Mood and Affect: Mood normal.          Additional Data:     Labs:  Results from last 7 days   Lab Units 11/25/23  0628   WBC Thousand/uL 5.92   HEMOGLOBIN g/dL 10.1*   HEMATOCRIT % 31.7*   PLATELETS Thousands/uL 145*   NEUTROS PCT % 49   LYMPHS PCT % 39   MONOS PCT % 8   EOS PCT % 1     Results from last 7 days   Lab Units 11/26/23  0614   SODIUM mmol/L 138   POTASSIUM mmol/L 5.6*   CHLORIDE mmol/L 93*   CO2 mmol/L 31   BUN mg/dL 63*   CREATININE mg/dL 1.66*   ANION GAP mmol/L 14   CALCIUM mg/dL 8.7   GLUCOSE RANDOM mg/dL 95         Results from last 7 days   Lab Units 11/26/23  1039 11/25/23  2107 11/25/23  1558 11/25/23  1119 11/25/23  0805 11/24/23  2233 11/24/23  1558 11/24/23  1150 11/24/23  0631 11/24/23  0155 11/23/23  2053 11/23/23  1626   POC GLUCOSE mg/dl 131 167* 150* 115 92 184* 186* 424* 122 171* 297* 484*               Lines/Drains:  Invasive Devices       Peripheral Intravenous Line  Duration             Peripheral IV 11/22/23 Left;Ventral (anterior) Forearm 4 days              Drain Duration             External Urinary Catheter 555 days                          Imaging: Reviewed radiology reports from this admission including: MRI brain    Recent Cultures (last 7 days):         Last 24 Hours Medication List:   Current Facility-Administered Medications   Medication Dose Route Frequency Provider Last Rate    albuterol  2 puff Inhalation Q4H PRN Hetul Shine, DO      allopurinol  150 mg Oral Daily Hetul Shine, DO      aspirin  81 mg Oral Daily Aun Sanket      calcium carbonate  1,000 mg Oral Daily PRN Hetul Shine, DO      clopidogrel  75 mg Oral Daily Hetul Shine, DO      colchicine  0.6 mg Oral Every Other Day Hetul Shine, DO      vitamin B-12  1,000 mcg Oral Daily Blake Banai, DO      Diclofenac Sodium  2 g Topical 4x Daily Gilbert Fan      gabapentin  300 mg Oral BID Hetul Shine, DO      heparin (porcine)  5,000 Units Subcutaneous Q8H [address] Hetul Shine, DO      HYDROcodone-acetaminophen  1 tablet Oral Q6H PRN Gilbert Fan      insulin lispro  1-5 Units Subcutaneous TID AC NATHEN Brown      insulin lispro  1-5 Units Subcutaneous HS NATHEN Arshad      insulin lispro  4 Units Subcutaneous TID With Meals Blake Banai, DO      isosorbide mononitrate  30 mg Oral Daily Hetul Shine, DO      levothyroxine  100 mcg Oral Early Morning Hetul Shine, DO      lidocaine  2 patch Topical Daily NATHEN Arrieta      methocarbamol  750 mg Oral Q6H PRN Hetul Shine, DO      metoprolol tartrate  25 mg Oral Q12H [address] Hetul Shine, DO      nystatin   Topical TID NATHEN Arrieta      ondansetron  4 mg Intravenous Q6H PRN Hetul Shine, DO      polyethylene glycol  17 g Oral Daily Hetul Shine, DO      pravastatin  10 mg Oral Daily With Dinner Gilbert Fan      predniSONE  5 mg Oral Daily Hetul Shine, DO      senna  8.6 mg Oral Daily Hetul Shine, DO      sertraline  25 mg Oral Daily Hetul Shine, DO      torsemide  20 mg Oral Daily Blake Banai, DO      torsemide  40 mg Oral Daily Blake Mary Flores,           Today, Patient Was Seen By: NATHEN Zuniga    **Please Note: This note may have been constructed using a voice recognition system. **

## 2023-11-26 NOTE — ASSESSMENT & PLAN NOTE
Lab Results   Component Value Date    EGFR 27 11/26/2023    EGFR 26 11/25/2023    EGFR 34 11/23/2023    CREATININE 1.66 (H) 11/26/2023    CREATININE 1.67 (H) 11/25/2023    CREATININE 1.35 (H) 11/23/2023   Known history of CKD baseline cr 1.4 -1.6  Cr appears to be at baseline     Increase torsemide to 40mg evening dose due to lower extremity edema, crackles on exam  Cr and volume status improving on torsemide 40 in the am and 20 mg in the p.m.  creat1.67 today within baseline

## 2023-11-26 NOTE — ASSESSMENT & PLAN NOTE
Wt Readings from Last 3 Encounters:   11/20/23 73.9 kg (163 lb)   11/19/23 73.9 kg (163 lb)   09/05/23 72.6 kg (160 lb)     Known hx with LVEF 35%  Continue torsemide 40 mg in am and 20 mg in pm   I/O, low sodium diet, and daily weights

## 2023-11-26 NOTE — ASSESSMENT & PLAN NOTE
Continue prednisone 5 mg daily  Patient on lidoderm patches at home 2 daily to each shoulder.   Discussed with daughter would continue at discharge for improved mobility at discharge

## 2023-11-26 NOTE — ASSESSMENT & PLAN NOTE
Patient had prehospital stroke alert initiated.  Admitted under stroke pathway  MRI with "Acute lacunar infarct in the right centrum semiovale."  Patient with improving strength in RUE, but continues to have some weakness  Continue stroke pathway  Neurology consulted:   Recommend 21 days DAPT unless cardiology would want [facility] due to ECHO findings  Per echo patient has stable EF of 35% and patient declined LHC in the past and wants more comfort approach  Continue DAPT for 21 days  PT/OT eval completed recommend moderate resource intensity   CM for dispo planning patient agreeable to STR placement/insurance authorization pending

## 2023-11-26 NOTE — ASSESSMENT & PLAN NOTE
Levothyroxine 100 daily. Patient previously was taking 112 which was changed to 100.     Continued on levothyroxine at 100 mcg daily

## 2023-11-26 NOTE — ASSESSMENT & PLAN NOTE
Patient reportedly intolerant to statins, but in review of her recent meds she does not appear to have trialed too many low intensity statins, previous provider discussed with patient and family at bedside and will try lowest dose pravastatin at 10 mg daily at this time

## 2023-11-27 LAB
ANION GAP SERPL CALCULATED.3IONS-SCNC: 11 MMOL/L
BUN SERPL-MCNC: 64 MG/DL (ref 5–25)
CALCIUM SERPL-MCNC: 9.6 MG/DL (ref 8.4–10.2)
CHLORIDE SERPL-SCNC: 95 MMOL/L (ref 96–108)
CO2 SERPL-SCNC: 34 MMOL/L (ref 21–32)
CREAT SERPL-MCNC: 1.81 MG/DL (ref 0.6–1.3)
ERYTHROCYTE [DISTWIDTH] IN BLOOD BY AUTOMATED COUNT: 15.6 % (ref 11.6–15.1)
GFR SERPL CREATININE-BSD FRML MDRD: 24 ML/MIN/1.73SQ M
GLUCOSE SERPL-MCNC: 105 MG/DL (ref 65–140)
GLUCOSE SERPL-MCNC: 113 MG/DL (ref 65–140)
GLUCOSE SERPL-MCNC: 122 MG/DL (ref 65–140)
GLUCOSE SERPL-MCNC: 138 MG/DL (ref 65–140)
GLUCOSE SERPL-MCNC: 141 MG/DL (ref 65–140)
HCT VFR BLD AUTO: 34 % (ref 34.8–46.1)
HGB BLD-MCNC: 11 G/DL (ref 11.5–15.4)
MCH RBC QN AUTO: 35.6 PG (ref 26.8–34.3)
MCHC RBC AUTO-ENTMCNC: 32.4 G/DL (ref 31.4–37.4)
MCV RBC AUTO: 110 FL (ref 82–98)
PLATELET # BLD AUTO: 172 THOUSANDS/UL (ref 149–390)
PMV BLD AUTO: 10.1 FL (ref 8.9–12.7)
POTASSIUM SERPL-SCNC: 4.1 MMOL/L (ref 3.5–5.3)
RBC # BLD AUTO: 3.09 MILLION/UL (ref 3.81–5.12)
SODIUM SERPL-SCNC: 140 MMOL/L (ref 135–147)
WBC # BLD AUTO: 6.5 THOUSAND/UL (ref 4.31–10.16)

## 2023-11-27 PROCEDURE — 82948 REAGENT STRIP/BLOOD GLUCOSE: CPT

## 2023-11-27 PROCEDURE — 99222 1ST HOSP IP/OBS MODERATE 55: CPT

## 2023-11-27 PROCEDURE — 80048 BASIC METABOLIC PNL TOTAL CA: CPT | Performed by: NURSE PRACTITIONER

## 2023-11-27 PROCEDURE — 97535 SELF CARE MNGMENT TRAINING: CPT

## 2023-11-27 PROCEDURE — 97530 THERAPEUTIC ACTIVITIES: CPT

## 2023-11-27 PROCEDURE — 99232 SBSQ HOSP IP/OBS MODERATE 35: CPT | Performed by: NURSE PRACTITIONER

## 2023-11-27 PROCEDURE — 85027 COMPLETE CBC AUTOMATED: CPT | Performed by: NURSE PRACTITIONER

## 2023-11-27 RX ADMIN — HEPARIN SODIUM 5000 UNITS: 5000 INJECTION INTRAVENOUS; SUBCUTANEOUS at 23:19

## 2023-11-27 RX ADMIN — HYDROCODONE BITARTRATE AND ACETAMINOPHEN 1 TABLET: 5; 325 TABLET ORAL at 18:52

## 2023-11-27 RX ADMIN — GABAPENTIN 300 MG: 300 CAPSULE ORAL at 23:18

## 2023-11-27 RX ADMIN — PREDNISONE 5 MG: 5 TABLET ORAL at 08:48

## 2023-11-27 RX ADMIN — INSULIN LISPRO 4 UNITS: 100 INJECTION, SOLUTION INTRAVENOUS; SUBCUTANEOUS at 17:03

## 2023-11-27 RX ADMIN — METOPROLOL TARTRATE 25 MG: 25 TABLET, FILM COATED ORAL at 23:18

## 2023-11-27 RX ADMIN — CLOPIDOGREL BISULFATE 75 MG: 75 TABLET ORAL at 08:48

## 2023-11-27 RX ADMIN — LEVOTHYROXINE SODIUM 100 MCG: 100 TABLET ORAL at 05:43

## 2023-11-27 RX ADMIN — METHOCARBAMOL 750 MG: 750 TABLET ORAL at 23:18

## 2023-11-27 RX ADMIN — Medication 2 G: at 08:54

## 2023-11-27 RX ADMIN — ASPIRIN 81 MG CHEWABLE TABLET 81 MG: 81 TABLET CHEWABLE at 08:46

## 2023-11-27 RX ADMIN — SERTRALINE 25 MG: 25 TABLET, FILM COATED ORAL at 08:46

## 2023-11-27 RX ADMIN — CYANOCOBALAMIN TAB 500 MCG 1000 MCG: 500 TAB at 08:47

## 2023-11-27 RX ADMIN — PRAVASTATIN SODIUM 10 MG: 10 TABLET ORAL at 17:02

## 2023-11-27 RX ADMIN — HYDROCODONE BITARTRATE AND ACETAMINOPHEN 1 TABLET: 5; 325 TABLET ORAL at 23:51

## 2023-11-27 RX ADMIN — Medication 2 G: at 23:18

## 2023-11-27 RX ADMIN — ISOSORBIDE MONONITRATE 30 MG: 30 TABLET, EXTENDED RELEASE ORAL at 08:47

## 2023-11-27 RX ADMIN — LIDOCAINE 5% 2 PATCH: 700 PATCH TOPICAL at 08:55

## 2023-11-27 RX ADMIN — METOPROLOL TARTRATE 25 MG: 25 TABLET, FILM COATED ORAL at 08:48

## 2023-11-27 RX ADMIN — POLYETHYLENE GLYCOL 3350 17 G: 17 POWDER, FOR SOLUTION ORAL at 08:46

## 2023-11-27 RX ADMIN — INSULIN LISPRO 4 UNITS: 100 INJECTION, SOLUTION INTRAVENOUS; SUBCUTANEOUS at 08:42

## 2023-11-27 RX ADMIN — SENNOSIDES 8.6 MG: 8.6 TABLET, FILM COATED ORAL at 08:46

## 2023-11-27 RX ADMIN — TORSEMIDE 40 MG: 20 TABLET ORAL at 14:09

## 2023-11-27 RX ADMIN — Medication 2 G: at 11:13

## 2023-11-27 RX ADMIN — NYSTATIN 1 APPLICATION: 100000 POWDER TOPICAL at 08:55

## 2023-11-27 RX ADMIN — ALLOPURINOL 150 MG: 300 TABLET ORAL at 08:48

## 2023-11-27 RX ADMIN — GABAPENTIN 300 MG: 300 CAPSULE ORAL at 08:46

## 2023-11-27 RX ADMIN — HYDROCODONE BITARTRATE AND ACETAMINOPHEN 1 TABLET: 5; 325 TABLET ORAL at 00:50

## 2023-11-27 RX ADMIN — HEPARIN SODIUM 5000 UNITS: 5000 INJECTION INTRAVENOUS; SUBCUTANEOUS at 05:43

## 2023-11-27 RX ADMIN — NYSTATIN: 100000 POWDER TOPICAL at 17:02

## 2023-11-27 RX ADMIN — NYSTATIN: 100000 POWDER TOPICAL at 23:20

## 2023-11-27 RX ADMIN — INSULIN LISPRO 4 UNITS: 100 INJECTION, SOLUTION INTRAVENOUS; SUBCUTANEOUS at 11:13

## 2023-11-27 RX ADMIN — Medication 2 G: at 17:03

## 2023-11-27 RX ADMIN — TORSEMIDE 20 MG: 20 TABLET ORAL at 08:48

## 2023-11-27 RX ADMIN — HEPARIN SODIUM 5000 UNITS: 5000 INJECTION INTRAVENOUS; SUBCUTANEOUS at 14:09

## 2023-11-27 NOTE — PLAN OF CARE
Problem: PHYSICAL THERAPY ADULT  Goal: Performs mobility at highest level of function for planned discharge setting. See evaluation for individualized goals. Description: Treatment/Interventions: Functional transfer training, LE strengthening/ROM, Therapeutic exercise, Endurance training, Patient/family training, Equipment eval/education, Bed mobility, Gait training, Spoke to nursing, Spoke to case management, OT          See flowsheet documentation for full assessment, interventions and recommendations. Outcome: Progressing  Note: Prognosis: Fair  Problem List: Decreased strength, Decreased endurance, Impaired balance, Decreased mobility, Decreased cognition, Impaired judgement, Decreased safety awareness  Assessment: Pt seen for PT treatment session with focus on bed mobility, functional transfers, activity tolerance. Pt making slow progress toward goals this session. Pt continues to present with decreased activity tolerance. Additionally, pt continues to present with decreased strength noted during transfers. Decreased muscular endurance noted as pt with poor standing tolerance during loreto care. Pt left upright in bedside chair with chair alarm intact and with all needs in reach. Pt will benefit from skilled therapy in order to address current impairments and functional limitations. PT to follow pt and recommending rehab once medically cleared. The patient's AM-PAC Basic Mobility Inpatient Short Form Raw Score is 8. A Raw score of less than or equal to 16 suggests the patient may benefit from discharge to post-acute rehabilitation services. Please also refer to the recommendation of the Physical Therapist for safe discharge planning. Barriers to Discharge: Inaccessible home environment, Decreased caregiver support     Rehab Resource Intensity Level, PT: II (Moderate Resource Intensity)    See flowsheet documentation for full assessment.

## 2023-11-27 NOTE — PLAN OF CARE
Problem: OCCUPATIONAL THERAPY ADULT  Goal: Performs self-care activities at highest level of function for planned discharge setting. See evaluation for individualized goals. Description: Treatment Interventions: ADL retraining, Functional transfer training, UE strengthening/ROM, Endurance training, Patient/family training, Equipment evaluation/education, Compensatory technique education, Continued evaluation, Energy conservation, Activityengagement          See flowsheet documentation for full assessment, interventions and recommendations. Outcome: Progressing  Note: Limitation: Decreased ADL status, Decreased UE ROM, Decreased UE strength, Decreased endurance, Decreased self-care trans, Decreased high-level ADLs     Assessment: Patient participated in Skilled OT session this date with interventions consisting of self care tasks, EOB sitting tolerance/balance, sit to stand transfers, standing tolerance. Bed mobility with rolling for pad change . Patient agreeable to OT treatment session, upon arrival patient was found supine in bed. In comparison to previous session, patient with improvements in sitting tolerance . Patient requiring verbal cues for safety, verbal cues for correct technique, one step directives, and frequent rest periods. Patient continues to be functioning below baseline level, occupational performance remains limited secondary to factors listed above and increased risk for falls and injury. From OT standpoint, recommendation at time of d/c would be Short Term Rehab. The patient's raw score on the AM-PAC Daily Activity Inpatient Short Form is 16. A raw score of less than 19 suggests the patient may benefit from discharge to post-acute rehabilitation services. Please refer to the recommendation of the Occupational Therapist for safe discharge planning.   Patient to benefit from continued Occupational Therapy treatment while in the hospital to address deficits as defined above and maximize level of functional independence with ADLs and functional mobility.      Rehab Resource Intensity Level, OT: II (Moderate Resource Intensity)

## 2023-11-27 NOTE — CASE MANAGEMENT
Case Management Discharge Planning Note    Patient name Lata Mccormack  Location [address] MRN [id number]  : 1934 Date 2023       Current Admission Date: 2023  Current Admission Diagnosis:CVA (cerebral vascular accident) [facility])   Patient Active Problem List    Diagnosis Date Noted    Metabolic encephalopathy     CVA (cerebral vascular accident) ([address]) 2023    Goals of care, counseling/discussion 2023    Fall 2023    Pressure injury of deep tissue 2023    Pancreatic lesion 2023    Rheumatoid arthritis ([address]) 2023    DM2 (diabetes mellitus, type 2) ([address]) 2023    History of renal artery stenosis 2023    Family history of gout 2023    Peripheral polyneuropathy 2023    Hypertension 2023    Lupus ([address]) 2023    Hyperlipidemia 2023    History of stroke 2023    Hemorrhoids 2023    Type 2 diabetes mellitus with diabetic neuropathy, without long-term current use of insulin ([address]) 02/15/2023    Primary hypertension 02/15/2023    Class 1 obesity due to excess calories without serious comorbidity with body mass index (BMI) of 33.0 to 33.9 in adult 02/15/2023    Thoracic spine fracture ([address]) 2023    Perineal hematoma 2023    Cervical spine fracture ([address]) 02/10/2023    Contusion 02/10/2023    CHF (congestive heart failure) ([address]) 02/10/2023    CKD (chronic kidney disease) stage 4, GFR 15-29 ml/min ([address]) 02/10/2023    Hypothyroidism 02/10/2023    Fall 2023    Ambulatory dysfunction 2022    Chronic midline low back pain without sciatica 2022    COVID-19 2022    Fall 2022    Fall 2022    Hypothyroidism 2022    DM type 2 (diabetes mellitus, type 2) ([address]) 2022    AZUL (obstructive sleep apnea) 2022    Essential hypertension 2022    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 ([address]) 2022    Renal artery stenosis ([address]) 2022    Lab test positive for detection of COVID-19 virus 05/19/2022    Recurrent UTI 09/28/2021    Multiple drug resistant organism (MDRO) culture positive 09/28/2021    HTN (hypertension), benign 12/14/2020    Chronic gout without tophus 29/30/8921    Toxic metabolic encephalopathy 05/02/4826    Aspiration pneumonia due to regurgitated food ([address]) 09/29/2020    Macrocytic anemia 09/29/2020    Nephrosclerosis arteriolar, stage 1-4 or unspecified chronic kidney disease 05/29/2020    Encounter for follow-up examination after completed treatment for malignant neoplasm 05/19/2020    Seasonal allergic rhinitis due to pollen 03/24/2020    Wheezing 11/04/2019    Stage 4 chronic kidney disease ([address]) 10/29/2019    Alkalosis 10/29/2019    Chronic respiratory failure with hypoxia ([address]) 10/21/2019    Chronic anemia 10/19/2019    SDH (subdural hematoma) ([address]) 10/19/2019    AZUL (obstructive sleep apnea) 10/19/2019    H/O spinal fusion 07/23/2019    Hyponatremia 01/21/2019    Chronic combined systolic and diastolic heart failure ([address]) 01/17/2019    Depression 01/17/2019    Essential hypertension 01/09/2019    Controlled type 2 diabetes mellitus with diabetic neuropathy, without long-term current use of insulin ([address]) 10/24/2017    SLE (systemic lupus erythematosus) ([address]) 10/24/2017    Vitamin D deficiency 08/22/2017    Renal artery stenosis ([address]) 02/08/2017    Peripheral polyneuropathy 02/07/2017    Hypothyroidism 09/23/2016    H/O: CVA (cerebrovascular accident) 09/23/2016    History of malignant neoplasm of breast 09/23/2016    History of aortic valve replacement 07/21/2015    RA (rheumatoid arthritis) ([address]) 07/21/2015    Hyperlipidemia 10/01/2013    Osteoporosis 10/01/2013    Obesity (BMI 30.0-34.9) 10/12/2012      LOS (days): 9  Geometric Mean LOS (GMLOS) (days): 4.60  Days to GMLOS:-4.3     OBJECTIVE:  Risk of Unplanned Readmission Score: 49.26         Current admission status: Inpatient   Preferred Pharmacy:   [id number] [place] - [address]  [address] [place] [place] [address]  Phone: [phone] Fax: [facility], [address]  [place] [address]  Phone: [phone] Fax: [phone]    [address] [place] [address], [address] [place] [address]  Phone: [phone] Fax: [phone]    PATIENT/FAMILY REPORTS NO PREFERRED PHARMACY  No address on file      Primary Care Provider: Martha Headley DO    Primary Insurance: [facility] REP  Secondary Insurance:     DISCHARGE DETAILS:     Therapy notes updated. Requested Auth via  DC Support.

## 2023-11-27 NOTE — PROGRESS NOTES
[address] [facility]  Progress Note  Name: Ruth Penny  MRN: [id number]  Unit/Bed#: PPHP 710-01 I Date of Admission: 11/18/2023   Date of Service: 11/27/2023 I Hospital Day: 9    Assessment/Plan   * CVA (cerebral vascular accident) [facility])  Assessment & Plan  Patient had prehospital stroke alert initiated. Admitted under stroke pathway  MRI with "Acute lacunar infarct in the right centrum semiovale."  Patient with improving strength in LUE, but continues to have some weakness; especially left LE; difficulty moving it to side step with PT session this am   Continue stroke pathway  Neurology consulted:   Recommend 21 days DAPT unless cardiology would want [facility] due to ECHO findings  Per echo patient has stable EF of 35% and patient declined LHC in the past and wants more comfort approach  Continue DAPT for 21 days  PT/OT eval completed recommend moderate resource intensity   CM for dispo planning patient agreeable to STR placement/insurance authorization pending     Metabolic encephalopathy  Assessment & Plan  With metabolic encephalopathy on admission likely secondary to acute CVA as noted on MRI imaging, continue on dual antiplatelet therapy, lower suspicion for urinary tract infection we will continue to monitor off antibiotics  Resolved;  Awake and oriented x 3    Chronic combined systolic and diastolic heart failure (HCC)  Assessment & Plan  Wt Readings from Last 3 Encounters:   11/20/23 73.9 kg (163 lb)   11/19/23 73.9 kg (163 lb)   09/05/23 72.6 kg (160 lb)     Known hx with LVEF 35%  Continue torsemide 40 mg in am and 20 mg in pm   I/O, low sodium diet, and daily weights           DM2 (diabetes mellitus, type 2) ([address])  Assessment & Plan    Lab Results   Component Value Date    HGBA1C 7.0 (H) 11/19/2023   Known history   Continue Sliding-scale insulin and blood glucose monitoring  CC diet     Goals of care, counseling/discussion  Assessment & Plan  Of note, patient is followed by [facility]. Note patient's outpatient notes. Confirmed with granddaughter that she is a level 3 DNR. Teo Speaker She has been followed by [facility] due to history of heart failure. Family is agreeable to stroke pathway work-up and recommendations. Patient presents with left-sided weakness   Continues with left sided weakness; difficulty ambulating due to left leg weakness. STR recommended; awaiting authorization    Stage 4 chronic kidney disease [facility])  Assessment & Plan  Lab Results   Component Value Date    EGFR 24 11/27/2023    EGFR 27 11/26/2023    EGFR 26 11/25/2023    CREATININE 1.81 (H) 11/27/2023    CREATININE 1.66 (H) 11/26/2023    CREATININE 1.67 (H) 11/25/2023   Known history of CKD baseline cr 1.4 -1.8  Cr appears to be at baseline     Increase torsemide to 40mg evening dose due to lower extremity edema, crackles on exam  Cr and volume status improving on torsemide 40 in the am and 20 mg in the p.m.  creat1.81 today  Monitor       Hyperlipidemia  Assessment & Plan  Patient reportedly intolerant to statins, but in review of her recent meds she does not appear to have trialed too many low intensity statins, previous provider discussed with patient and family at bedside and will try lowest dose pravastatin at 10 mg daily at this time    Hypothyroidism  Assessment & Plan  Levothyroxine 100 daily. Patient previously was taking 112 which was changed to 100. Continued on levothyroxine at 100 mcg daily    Rheumatoid arthritis (HCC)  Assessment & Plan  Continue prednisone 5 mg daily  Patient on lidoderm patches at home 2 daily to each shoulder. Discussed with daughter would continue at discharge for improved mobility at discharge     SLE (systemic lupus erythematosus) ([address])  Assessment & Plan  Currently on prednisone.     Daughter confirms that she is taking 5 mg daily               VTE Pharmacologic Prophylaxis: VTE Score: 10 High Risk (Score >/= 5) - Pharmacological DVT Prophylaxis Ordered: heparin. Sequential Compression Devices Ordered. Mobility:   Basic Mobility Inpatient Raw Score: 8  -HLM Goal: 3: Sit at edge of bed  JH-HLM Achieved: 4: Move to chair/commode  HLM Goal achieved. Continue to encourage appropriate mobility. Patient Centered Rounds: I performed bedside rounds with nursing staff today. Discussions with Specialists or Other Care Team Provider: case management     Education and Discussions with Family / Patient: Updated  (daughter) via phone. Total Time Spent on Date of Encounter in care of patient: greater than 45 mins. This time was spent on one or more of the following: performing physical exam; counseling and coordination of care; obtaining or reviewing history; documenting in the medical record; reviewing/ordering tests, medications or procedures; communicating with other healthcare professionals and discussing with patient's family/caregivers. Current Length of Stay: 9 day(s)  Current Patient Status: Inpatient   Certification Statement: The patient will continue to require additional inpatient hospital stay due to STR when authorization is obtained  Discharge Plan: Anticipate discharge in 24-48 hrs to rehab facility. Code Status: Level 3 - DNAR and DNI    Subjective:   Patient seen sitting up in chair, working with physical therapy. She was able to stand with assistance of 2, needed maximum assistance transferring from chair to bedside commode, noted patient having difficulty lifting left leg. Reports good appetite no nausea or vomiting. Able to tell me that she is at [address], the year and president. Objective:     Vitals:   Temp (24hrs), Av.4 °F (36.3 °C), Min:96 °F (35.6 °C), Max:98.2 °F (36.8 °C)    Temp:  [96 °F (35.6 °C)-98.2 °F (36.8 °C)] 98.1 °F (36.7 °C)  HR:  [66-72] 67  Resp:  [19-24] 19  BP: (120-154)/(48-64) 120/64  SpO2:  [90 %-95 %] 94 %  Body mass index is 32.92 kg/m².      Input and Output Summary (last 24 hours): Intake/Output Summary (Last 24 hours) at 11/27/2023 1448  Last data filed at 11/27/2023 1001  Gross per 24 hour   Intake 920 ml   Output --   Net 920 ml       Physical Exam:   Physical Exam  Vitals and nursing note reviewed. Constitutional:       General: She is not in acute distress. Appearance: She is obese. HENT:      Head: Normocephalic. Nose: Nose normal.      Mouth/Throat:      Mouth: Mucous membranes are moist.   Eyes:      Extraocular Movements: Extraocular movements intact. Conjunctiva/sclera: Conjunctivae normal.   Cardiovascular:      Rate and Rhythm: Normal rate and regular rhythm. Pulses: Normal pulses. Heart sounds: Normal heart sounds. Pulmonary:      Effort: No respiratory distress. Breath sounds: Normal breath sounds. Abdominal:      General: Bowel sounds are normal. There is no distension. Palpations: Abdomen is soft. Tenderness: There is no abdominal tenderness. Genitourinary:     Comments: Spontaneously  Musculoskeletal:      Cervical back: Normal range of motion. Comments: +1 bilateral LE edema  Left LE weakness noted, some LUE weakness noted   Skin:     Capillary Refill: Capillary refill takes less than 2 seconds. Neurological:      General: No focal deficit present. Mental Status: She is oriented to person, place, and time. Psychiatric:         Mood and Affect: Mood normal.         Behavior: Behavior normal.         Thought Content:  Thought content normal.         Judgment: Judgment normal.          Additional Data:     Labs:  Results from last 7 days   Lab Units 11/27/23  0558 11/25/23  0628   WBC Thousand/uL 6.50 5.92   HEMOGLOBIN g/dL 11.0* 10.1*   HEMATOCRIT % 34.0* 31.7*   PLATELETS Thousands/uL 172 145*   NEUTROS PCT %  --  49   LYMPHS PCT %  --  39   MONOS PCT %  --  8   EOS PCT %  --  1     Results from last 7 days   Lab Units 11/27/23  0558   SODIUM mmol/L 140   POTASSIUM mmol/L 4.1   CHLORIDE mmol/L 95* CO2 mmol/L 34*   BUN mg/dL 64*   CREATININE mg/dL 1.81*   ANION GAP mmol/L 11   CALCIUM mg/dL 9.6   GLUCOSE RANDOM mg/dL 113         Results from last 7 days   Lab Units 11/27/23  1009 11/27/23  0556 11/26/23  2206 11/26/23  1648 11/26/23  1039 11/25/23  2107 11/25/23  1558 11/25/23  1119 11/25/23  0805 11/24/23  2233 11/24/23  1558 11/24/23  1150   POC GLUCOSE mg/dl 141* 122 190* 200* 131 167* 150* 115 92 184* 186* 424*               Lines/Drains:  Invasive Devices       Peripheral Intravenous Line  Duration             Peripheral IV 11/26/23 Right;Ventral (anterior) Forearm 1 day              Drain  Duration             External Urinary Catheter 556 days                          Imaging: No pertinent imaging reviewed.     Recent Cultures (last 7 days):         Last 24 Hours Medication List:   Current Facility-Administered Medications   Medication Dose Route Frequency Provider Last Rate    albuterol  2 puff Inhalation Q4H PRN Hetul Shine, DO      allopurinol  150 mg Oral Daily Hetul Shine, DO      aspirin  81 mg Oral Daily Aun Sanket      calcium carbonate  1,000 mg Oral Daily PRN Hetul Shine, DO      clopidogrel  75 mg Oral Daily Hetul Shine, DO      colchicine  0.6 mg Oral Every Other Day Hetul Shine, DO      vitamin B-12  1,000 mcg Oral Daily Blake Banai, DO      Diclofenac Sodium  2 g Topical 4x Daily Christine Lazaro      gabapentin  300 mg Oral BID Hetul Shine, DO      heparin (porcine)  5,000 Units Subcutaneous Q8H [address] Hetul Shine, DO      HYDROcodone-acetaminophen  1 tablet Oral Q6H PRN Christine Lazaro      insulin lispro  1-5 Units Subcutaneous TID AC NATHEN Brown      insulin lispro  1-5 Units Subcutaneous HS NATHEN Beauchamp      insulin lispro  4 Units Subcutaneous TID With Meals Blake Banai, DO      isosorbide mononitrate  30 mg Oral Daily Hetul Shine, DO      levothyroxine  100 mcg Oral Early Morning Hetul Shine, DO      lidocaine  2 patch Topical Daily NATHEN Jonas methocarbamol  750 mg Oral Q6H PRN Hetul Shine, DO      metoprolol tartrate  25 mg Oral Q12H [address] Hetul Shine, DO      nystatin   Topical TID NATHEN Freire      ondansetron  4 mg Intravenous Q6H PRN Hetul Shine, DO      polyethylene glycol  17 g Oral Daily Hetul Shine, DO      pravastatin  10 mg Oral Daily With [facility]      predniSONE  5 mg Oral Daily Hetul Shine, DO      senna  8.6 mg Oral Daily Hetul Shine, DO      sertraline  25 mg Oral Daily Hetul Shine, DO      torsemide  20 mg Oral Daily Blake Banai, DO      torsemide  40 mg Oral Daily Blake Banai, DO          Today, Patient Was Seen By: NATHEN Hess    **Please Note: This note may have been constructed using a voice recognition system. **

## 2023-11-27 NOTE — ASSESSMENT & PLAN NOTE
Patient had prehospital stroke alert initiated.  Admitted under stroke pathway  MRI with "Acute lacunar infarct in the right centrum semiovale."  Patient with improving strength in LUE, but continues to have some weakness; especially left LE; difficulty moving it to side step with PT session this am   Continue stroke pathway  Neurology consulted:   Recommend 21 days DAPT unless cardiology would want [facility] due to ECHO findings  Per echo patient has stable EF of 35% and patient declined LHC in the past and wants more comfort approach  Continue DAPT for 21 days  PT/OT eval completed recommend moderate resource intensity   CM for dispo planning patient agreeable to STR placement/insurance authorization pending

## 2023-11-27 NOTE — RESTORATIVE TECHNICIAN NOTE
Restorative Technician Note      Patient Name: Mauricio More     Note Type: Mobility  Patient Position Upon Consult: Bedside chair  Activity Performed: Transferred; Dangled; Stood  Assistive Device: Other (Comment) (Assist x2 to the commode/chair)  Education Provided: Yes  Patient Position at End of Consult: Bedside chair;  All needs within reach; Bed/Chair alarm activated  Benja GHOSH, Restorative Technician, [employer]

## 2023-11-27 NOTE — OCCUPATIONAL THERAPY NOTE
Occupational Therapy Progress Note     Patient Name: Artie Powell  Today's Date: 11/27/2023  Problem List  Principal Problem:    CVA (cerebral vascular accident) [facility])  Active Problems:    Hypothyroidism    Chronic combined systolic and diastolic heart failure (HCC)    SLE (systemic lupus erythematosus) (HCC)    Stage 4 chronic kidney disease (HCC)    Rheumatoid arthritis ([address])    DM2 (diabetes mellitus, type 2) ([address])    Hyperlipidemia    Goals of care, counseling/discussion    Metabolic encephalopathy       Occupational Therapy Treatment Note       11/27/23 0950   OT Last Visit   OT Visit Date 11/27/23   Note Type   Note Type Treatment for insurance authorization   Pain Assessment   Pain Assessment Tool 0-10   Pain Score No Pain   Restrictions/Precautions   Weight Bearing Precautions Per Order No   Other Precautions Suicidal;Chair Alarm   Lifestyle   Autonomy Pt reports I w/ ADLs and fxnl mobility w/ RW and requires assist w/ IADLs at baseline; - driving. Reciprocal Relationships Pt lives in an in-law suite attached to her daughter and son-in-law's house. 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 ADL   Where Assessed Commode   Grooming Assistance 5  Supervision/Setup   UB Bathing Assistance 1  Total Assistance   LB Bathing Assistance 1  Total Assistance   UB Dressing Assistance 3  Moderate Assistance   LB Dressing Assistance 2  Maximal Assistance   LB Dressing Deficit Don/doff R sock; Don/doff L sock   Toileting Assistance  2  Maximal Assistance   Toileting Deficit Perineal hygiene;Clothing management down;Clothing management up   Cognition   Overall Cognitive Status Impaired   Arousal/Participation Cooperative   Attention Attends with cues to redirect   Orientation Level Oriented to person;Oriented to place   Memory Decreased recall of precautions   Following Commands Follows one step commands with increased time or repetition   AM-PAC Daily Activity Inpatient   Lower Body Dressing 1   Bathing 1   Toileting 1   Upper Body Dressing 1   Grooming 1   Eating 1   Daily Activity Raw Score 6   AM-PAC Applied Cognition Inpatient   Following a Speech/Presentation 2   Understanding Ordinary Conversation 4   Taking Medications 3   Remembering Where Things Are Placed or Put Away 4   Remembering List of 4-5 Errands 3   Taking Care of Complicated Tasks 3   Applied Cognition Raw Score 19   Applied Cognition Standardized Score 39.77   Barthel Index   Grooming Score 0   Dressing Score 5   Toilet Use Score 5   Transfers (Bed/Chair) Score 5   Mobility (Level Surface) Score 0       Deanne ESCOBAR/LAKISHA

## 2023-11-27 NOTE — ASSESSMENT & PLAN NOTE
With metabolic encephalopathy on admission likely secondary to acute CVA as noted on MRI imaging, continue on dual antiplatelet therapy, lower suspicion for urinary tract infection we will continue to monitor off antibiotics  Resolved;  Awake and oriented x 3

## 2023-11-27 NOTE — ASSESSMENT & PLAN NOTE
Lab Results   Component Value Date    EGFR 24 11/27/2023    EGFR 27 11/26/2023    EGFR 26 11/25/2023    CREATININE 1.81 (H) 11/27/2023    CREATININE 1.66 (H) 11/26/2023    CREATININE 1.67 (H) 11/25/2023   Known history of CKD baseline cr 1.4 -1.8  Cr appears to be at baseline     Increase torsemide to 40mg evening dose due to lower extremity edema, crackles on exam  Cr and volume status improving on torsemide 40 in the am and 20 mg in the p.m.  creat1.81 today  Monitor

## 2023-11-27 NOTE — PHYSICAL THERAPY NOTE
PHYSICAL THERAPY NOTE          Patient Name: Mauricio More  Today's Date: 11/27/2023 11/27/23 0954   PT Last Visit   PT Visit Date 11/27/23   Note Type   Note Type Treatment for insurance authorization   Pain Assessment   Pain Assessment Tool 0-10   Pain Score No Pain   Restrictions/Precautions   Weight Bearing Precautions Per Order No   Other Precautions Cognitive; Chair Alarm; Bed Alarm;Multiple lines;Telemetry; Fall Risk  (L weakness)   General   Chart Reviewed Yes   Response to Previous Treatment Patient with no complaints from previous session. Family/Caregiver Present No   Cognition   Overall Cognitive Status Impaired   Arousal/Participation Cooperative   Attention Attends with cues to redirect   Orientation Level Oriented to person;Oriented to place   Memory Decreased recall of precautions   Following Commands Follows one step commands with increased time or repetition   Comments Some decreased safety awareness   Subjective   Subjective Pleasant and agreeable to participate   Bed Mobility   Additional Comments Found and left OOB in chair with chair alarm intact and all needs in reach. Transfers   Sit to Stand 2  Maximal assistance   Additional items Assist x 2; Increased time required;Verbal cues   Stand to Sit 2  Maximal assistance   Additional items Assist x 2; Increased time required;Verbal cues   Stand pivot 2  Maximal assistance   Additional items Assist x 2; Increased time required;Verbal cues   Toilet transfer 2  Maximal assistance   Additional items Assist x 2; Increased time required;Verbal cues; Commode   Additional Comments STS with b/l HHA and knee block. STS performed multiple times throughout session for loreto care and transfer from chair<>commode.    Balance   Static Sitting Fair -   Dynamic Sitting Fair -   Static Standing Poor   Dynamic Standing Poor -   Endurance Deficit   Endurance Deficit Yes   Endurance Deficit Description fatigue, weakness   Activity Tolerance   Activity Tolerance Patient limited by fatigue   Medical Staff [id number]   Nurse Made Aware RN cleared pt to be seen by PT   Assessment   Prognosis Fair   Problem List Decreased strength;Decreased endurance; Impaired balance;Decreased mobility; Decreased cognition; Impaired judgement;Decreased safety awareness   Assessment Pt seen for PT treatment session with focus on bed mobility, functional transfers, activity tolerance. Pt making slow progress toward goals this session. Pt continues to present with decreased activity tolerance. Additionally, pt continues to present with decreased strength noted during transfers. Decreased muscular endurance noted as pt with poor standing tolerance during loreto care. Pt left upright in bedside chair with chair alarm intact and with all needs in reach. Pt will benefit from skilled therapy in order to address current impairments and functional limitations. PT to follow pt and recommending rehab once medically cleared. The patient's AM-[facility] Basic Mobility Inpatient Short Form Raw Score is 8. A Raw score of less than or equal to 16 suggests the patient may benefit from discharge to post-acute rehabilitation services. Please also refer to the recommendation of the Physical Therapist for safe discharge planning. Barriers to Discharge Inaccessible home environment;Decreased caregiver support   Goals   Patient Goals to get better   STG Expiration Date 12/03/23   Plan   Treatment/Interventions Functional transfer training;LE strengthening/ROM; Therapeutic exercise; Endurance training;Patient/family training;Equipment eval/education; Bed mobility;Gait training;Cognitive reorientation;Spoke to nursing   Progress Slow progress, decreased activity tolerance   PT Frequency 3-5x/wk   Discharge Recommendation   Rehab Resource Intensity Level, PT II (Moderate Resource Intensity)   AM-PAC Basic Mobility Inpatient   Turning in Flat Bed Without Bedrails 2   Lying on Back to Sitting on Edge of Flat Bed Without Bedrails 2   Moving Bed to Chair 1   Standing Up From Chair Using Arms 1   Walk in Room 1   Climb 3-5 Stairs With Railing 1   Basic Mobility Inpatient Raw Score 8   Turning Head Towards Sound 4   Follow Simple Instructions 3   Low Function Basic Mobility Raw Score  15   Low Function Basic Mobility Standardized Score  23.9   Highest Level Of Mobility   JH-HLM Goal 3: Sit at edge of bed   JH-HLM Achieved 4: Move to chair/commode       Cassie Fish, PT, DPT

## 2023-11-27 NOTE — CONSULTS
Consultation - [address] [facility] 80 y.o. female MRN: [id number]  Unit/Bed#: [facility] 710-01 Encounter: [id number]    Assessment:  Intertrigo   Pressure induced deep tissue damage of vulva, subsequent encounter   Cutaneous candidiasis   Ambulatory dysfunction   Urinary and fecal incontinence   Type 2 DM without long term use of insulin    Plan:  B/l vulva/perineum with intact blanchable hyperpigmented skin. Low suspicion for pressure injury. Suspect skin discoloration is chronic related to incontinence. It is also stable in appearance from previous images. Okay to continue with hydraguard cream and non-use of pure-wick. ITD with mild candidiasis to the abdominal area - nystatin powder already in place. Recommend to continue. No s/s of infection present   A1C results reviewed with the patient today. Result of 7.0 reviewed from 11/19/23  Managed by primary team   Recommend to continue with preventative nursing skin care measures in place as per WOCN team  Pressure relief- offloading of pressure with turning/repositioning as patient medically tolerates, heel elevation, foam wedges for offloading/repositioning, and waffle cushion to chair. Nutrition is following  Patient verbalized understanding of plan of care. [facility] text wound care team with questions or concerns. Routine wound care follow-up while admitted. AVS updated. History of Present Illness:  Patient is a 80year old female admitted with CVA. History of - HLD, DM, RA, CHF, CKD, SLE, and hypothyroidism. Patient seen today for follow-up visit of vulvar/perineum pressure injury and newly noted ITD to pannus for which nystatin powder has been ordered by the primary team. Patient has been followed by [address] [place] [address] team during this hospital stay, with current wound management of hydraguard cream. Patient unable to provide wound history and no family at bedside.  Per patient chart review and previous wound care notes - patient spends a lot of time in the recliner chair and is incontinent of bowel and bladder. Images from previous inpatient visits. No reported fever, chills, or increased pain related to the wound. Subjective:    Review of Systems   Unable to perform ROS: Other (patient only alert and oriented x 2)       Historical Information   Past Medical History:   Diagnosis Date    Arthritis     Breast cancer ([address]) 09/08/2015    BILATERAL    Cardiac disease     aortic valve transplant    CHF (congestive heart failure) ([address])     Chronic kidney disease     Compression fracture of body of thoracic vertebra (HCC)     CVA (cerebral vascular accident) ([address])     Diabetes mellitus ([address])     Disease of thyroid gland     Fibromyalgia, primary     Gout     H/O cervical fracture 01/09/2019    Hyperlipidemia     Hypertension     Neuropathy     AZUL (obstructive sleep apnea) 10/19/2019    Pressure injury of skin     Renal disorder     kidney stent    Stroke [facility])     UTI (urinary tract infection)     colinized bacteria.      Past Surgical History:   Procedure Laterality Date    AORTIC VALVE SURGERY      BREAST LUMPECTOMY Right 09/08/2015    BREAST LUMPECTOMY Left 09/08/2015    BREAST SURGERY      lumpectomy for breast cancer    BREAST SURGERY      CARDIAC VALVE REPLACEMENT      CATARACT EXTRACTION      CERVICAL SPINE SURGERY      CHOLECYSTECTOMY      FACIAL/NECK BIOPSY Right 9/24/2020    Procedure: EXCISION CHEEK LESION X2 WITH FROZEN SECTION;  Surgeon: Skip Salinas DO;  Location: [address] [place] [address] MAIN OR;  Service: [address] [place] [address] THORACENTESIS  2/28/2020    JOINT REPLACEMENT      KIDNEY SURGERY      stent placed for kidney    KNEE SURGERY      OTHER SURGICAL HISTORY      abdominal aneurysm surgery    RI ARTHRODESIS POSTERIOR ATLAS-AXIS C1-C2 N/A 5/1/2019    Procedure: C1-C2 lateral mass fixation fusion with possible sublaminar cables;  Surgeon: Oj Bean MD;  Location:  MAIN OR;  Service: Neurosurgery    REPLACEMENT TOTAL KNEE      left      Social History   Social History     Substance and Sexual Activity   Alcohol Use Not Currently    Comment: rarely, 1 glass wine per month     Social History     Substance and Sexual Activity   Drug Use Not Currently     E-Cigarette/Vaping    E-Cigarette Use Never User      E-Cigarette/Vaping Substances    Nicotine No     THC No     CBD No     Flavoring No     Other No     Unknown No      Social History     Tobacco Use   Smoking Status Never   Smokeless Tobacco Never     Family History:   Family History   Problem Relation Age of Onset    Diabetes Mother     Heart attack Mother     Heart attack Father     Heart disease Mother     Arthritis Mother     Heart failure Father     Arthritis Father     Other Father         enlargement of prostate     Dementia Father     No Known Problems Daughter     No Known Problems Maternal Grandmother     No Known Problems Maternal Grandfather     No Known Problems Paternal Grandmother     No Known Problems Paternal Grandfather     No Known Problems Maternal Aunt     No Known Problems Maternal Aunt     No Known Problems Maternal Aunt     No Known Problems Maternal Aunt     No Known Problems Paternal Aunt     No Known Problems Paternal Aunt     Prostate cancer Son     Prostate cancer Son        Meds/Allergies   current meds:   Current Facility-Administered Medications   Medication Dose Route Frequency    albuterol (PROVENTIL HFA,VENTOLIN HFA) inhaler 2 puff  2 puff Inhalation Q4H PRN    allopurinol (ZYLOPRIM) tablet 150 mg  150 mg Oral Daily    aspirin chewable tablet 81 mg  81 mg Oral Daily    calcium carbonate (TUMS) chewable tablet 1,000 mg  1,000 mg Oral Daily PRN    clopidogrel (PLAVIX) tablet 75 mg  75 mg Oral Daily    colchicine (COLCRYS) tablet 0.6 mg  0.6 mg Oral Every Other Day    cyanocobalamin (VITAMIN B-12) tablet 1,000 mcg  1,000 mcg Oral Daily    Diclofenac Sodium (VOLTAREN) 1 % topical gel 2 g  2 g Topical 4x Daily    gabapentin (NEURONTIN) capsule 300 mg  300 mg Oral BID heparin (porcine) subcutaneous injection 5,000 Units  5,000 Units Subcutaneous Q8H [address]    HYDROcodone-acetaminophen (NORCO) 5-325 mg per tablet 1 tablet  1 tablet Oral Q6H PRN    insulin lispro (HumaLOG) 100 units/mL subcutaneous injection 1-5 Units  1-5 Units Subcutaneous TID AC    insulin lispro (HumaLOG) 100 units/mL subcutaneous injection 1-5 Units  1-5 Units Subcutaneous HS    insulin lispro (HumaLOG) 100 units/mL subcutaneous injection 4 Units  4 Units Subcutaneous TID With Meals    isosorbide mononitrate (IMDUR) 24 hr tablet 30 mg  30 mg Oral Daily    levothyroxine tablet 100 mcg  100 mcg Oral Early Morning    lidocaine (LIDODERM) 5 % patch 2 patch  2 patch Topical Daily    methocarbamol (ROBAXIN) tablet 750 mg  750 mg Oral Q6H PRN    metoprolol tartrate (LOPRESSOR) tablet 25 mg  25 mg Oral Q12H KARINE    nystatin (MYCOSTATIN) powder   Topical TID    ondansetron (ZOFRAN) injection 4 mg  4 mg Intravenous Q6H PRN    polyethylene glycol (MIRALAX) packet 17 g  17 g Oral Daily    pravastatin (PRAVACHOL) tablet 10 mg  10 mg Oral Daily With Dinner    predniSONE tablet 5 mg  5 mg Oral Daily    senna (SENOKOT) tablet 8.6 mg  8.6 mg Oral Daily    sertraline (ZOLOFT) tablet 25 mg  25 mg Oral Daily    torsemide (DEMADEX) tablet 20 mg  20 mg Oral Daily    torsemide (DEMADEX) tablet 40 mg  40 mg Oral Daily     Allergies   Allergen Reactions    Duloxetine Hcl Other (See Comments) and Hypertension    Duloxetine Hcl      Cymbalta;  Hemorrhagic stroke listed as reaction    Duloxetine Hcl Hemorrhagic stroke and Hypertension    Escitalopram      Other reaction(s): Urinary Retention    Pregabalin      Other reaction(s): Hypertension    Statins Myalgia    Tramadol      Other reaction(s): Hypertension    Triprolidine-Pse Other (See Comments)    Anastrozole Abdominal Pain    Anastrozole     Anastrozole Other (See Comments)     Other     Antihistamines, Diphenhydramine-Type     Exemestane      Aromasin; Muscle pain & cramps    Exemestane Other (See Comments)     Other     Lexapro [Escitalopram]      Urinary retention    Lexapro [Escitalopram] Other (See Comments)     Urinary retention    Lyrica [Pregabalin]      hypertension    Lyrica [Pregabalin] Hypertension    Metformin      diarrhea    Metformin Other (See Comments)     Other       Oxycodone      Pt unsure      Oxycodone Other (See Comments)     Other       Pseudoephedrine Other (See Comments)     Other       Statins      Muscle pain & cramps    Statins Myalgia    Tramadol      hypertension    Tramadol Hypertension    Triprolidine Other (See Comments)     Other       Triprolidine-Pseudoephedrine     Metformin Diarrhea    Metolazone Other (See Comments)     Dizzyness, hypotension       Objective   Vitals: Blood pressure 120/64, pulse 67, temperature 98.1 °F (36.7 °C), resp. rate 19, height 4' 11" (1.499 m), weight 73.9 kg (163 lb), SpO2 94 %, not currently breastfeeding. Wounds:     2. R abdominal pannus with ITD. Measures: 0.5x3.0x0.1cm. Presents as scattered linear shaped partial thickness wound located at the base of the skin fold. Wound is 100% moist pink tissue producing scant serous drainage. Edges fragile and attached without maceration. Olga-wound/remainder of pannus is dry and intact with mild candidiasis rash. 2. B/l vulva/perineum is dry and intact with diffuse blanchable hyperpigmented skin. No open aspect, redness, or drainage. Non-tender with palpation. 3. B/l heels are dry and intact without redness or wounds. 4. B/l sacro-buttocks are dry and intact with diffuse blanchable hyperpigmented skin that is non-tender with palpation. No open aspect, redness or drainage. No induration, fluctuance, odor, warmth/temperature differences, redness, or purulence noted to the above mentioned wounds and skin areas assessed. Patient tolerated assessment well-  no s/s of non-verbal pain or discomfort observed during the encounter.           Physical Exam  Constitutional: General: She is awake. She is not in acute distress. Appearance: She is obese. She is not diaphoretic. HENT:      Head: Normocephalic and atraumatic. Right Ear: External ear normal.      Left Ear: External ear normal.   Eyes:      Conjunctiva/sclera: Conjunctivae normal.   Pulmonary:      Effort: Pulmonary effort is normal. No respiratory distress. Genitourinary:     Comments: Incontinent of bowel and bladder. No pure-wick in use at time of the assessment. Musculoskeletal:      Comments: Dependent for care. Max assist of two with turning in the recliner chair for the assessment. Patient on waffle cushion. Skin:     General: Skin is warm and dry. Findings: Wound present. Comments: Refer to wound section for assessment details. Neurological:      Mental Status: She is alert. Gait: Gait abnormal.   Psychiatric:         Behavior: Behavior is cooperative. Lab, Imaging and other studies: I have personally reviewed pertinent reports. Code Status: Level 3 - DNAR and DNI      Counseling / Coordination of Care  Total time spent today:    Total time (face-to-face and non-face-to-face) spent on today's visit was 30 minutes. This includes preparation for the visits (H&P on 11/18/23, previous wound care notes/images from 9/5/23 and 11/20/23, and SLIM note from 11/26/23 ) performance of a medically appropriate history and examination, and orders for medications/treatments or testing. Discussed assessment findings, and plan of care/recommendations with patients RN. NATHEN Childers, ROMAINE-C, KARLI      Portions of the record may have been created with voice recognition software. Occasional wrong word or "sound a like" substitutions may have occurred due to the inherent limitations of voice recognition software.   Read the chart carefully and recognize, using context, where substitutions have occurred

## 2023-11-27 NOTE — ASSESSMENT & PLAN NOTE
Of note, patient is followed by [facility] palliative care. Note patient's outpatient notes. Confirmed with granddaughter that she is a level 3 DNR. Kamille Daniel She has been followed by [facility] palliative care due to history of heart failure. Family is agreeable to stroke pathway work-up and recommendations. Patient presents with left-sided weakness   Continues with left sided weakness; difficulty ambulating due to left leg weakness.    STR recommended; awaiting authorization

## 2023-11-27 NOTE — RESTORATIVE TECHNICIAN NOTE
Restorative Technician Note      Patient Name: Mary Lou Sprague     Note Type: Mobility  Patient Position Upon Consult: Supine  Activity Performed: Dangled; Transferred; Stood  Assistive Device: Other (Comment) (Assist x2)  Education Provided: Yes  Patient Position at End of Consult: Bedside chair;  All needs within reach; Bed/Chair alarm activated    Sanam GHOSH, Restorative Technician, [employer]

## 2023-11-27 NOTE — CASE MANAGEMENT
[address] received request for authorization from Care Manager.   Authorization request for: SNF  Facility Name: [facility]  PGC:[id number]  Facility MD: Francisco Plaza    NPI: [id number]  Authorization initiated by contacting insurance:[employer]  Via: Availity   Pending Reference #:[id number]  Clinicals submitted via: Availity attachment

## 2023-11-28 VITALS
HEART RATE: 60 BPM | SYSTOLIC BLOOD PRESSURE: 135 MMHG | TEMPERATURE: 98.1 F | RESPIRATION RATE: 18 BRPM | WEIGHT: 163 LBS | BODY MASS INDEX: 32.86 KG/M2 | DIASTOLIC BLOOD PRESSURE: 52 MMHG | OXYGEN SATURATION: 92 % | HEIGHT: 59 IN

## 2023-11-28 LAB
ANION GAP SERPL CALCULATED.3IONS-SCNC: 12 MMOL/L
BASOPHILS # BLD AUTO: 0.04 THOUSANDS/ÂΜL (ref 0–0.1)
BASOPHILS NFR BLD AUTO: 1 % (ref 0–1)
BUN SERPL-MCNC: 70 MG/DL (ref 5–25)
CALCIUM SERPL-MCNC: 8.5 MG/DL (ref 8.4–10.2)
CHLORIDE SERPL-SCNC: 94 MMOL/L (ref 96–108)
CO2 SERPL-SCNC: 32 MMOL/L (ref 21–32)
CREAT SERPL-MCNC: 1.94 MG/DL (ref 0.6–1.3)
EOSINOPHIL # BLD AUTO: 0.06 THOUSAND/ÂΜL (ref 0–0.61)
EOSINOPHIL NFR BLD AUTO: 1 % (ref 0–6)
ERYTHROCYTE [DISTWIDTH] IN BLOOD BY AUTOMATED COUNT: 15.5 % (ref 11.6–15.1)
GFR SERPL CREATININE-BSD FRML MDRD: 22 ML/MIN/1.73SQ M
GLUCOSE SERPL-MCNC: 111 MG/DL (ref 65–140)
GLUCOSE SERPL-MCNC: 161 MG/DL (ref 65–140)
GLUCOSE SERPL-MCNC: 89 MG/DL (ref 65–140)
HCT VFR BLD AUTO: 31.9 % (ref 34.8–46.1)
HGB BLD-MCNC: 9.8 G/DL (ref 11.5–15.4)
IMM GRANULOCYTES # BLD AUTO: 0.13 THOUSAND/UL (ref 0–0.2)
IMM GRANULOCYTES NFR BLD AUTO: 2 % (ref 0–2)
LYMPHOCYTES # BLD AUTO: 2.27 THOUSANDS/ÂΜL (ref 0.6–4.47)
LYMPHOCYTES NFR BLD AUTO: 41 % (ref 14–44)
MCH RBC QN AUTO: 34.9 PG (ref 26.8–34.3)
MCHC RBC AUTO-ENTMCNC: 30.7 G/DL (ref 31.4–37.4)
MCV RBC AUTO: 114 FL (ref 82–98)
MONOCYTES # BLD AUTO: 0.47 THOUSAND/ÂΜL (ref 0.17–1.22)
MONOCYTES NFR BLD AUTO: 8 % (ref 4–12)
NEUTROPHILS # BLD AUTO: 2.64 THOUSANDS/ÂΜL (ref 1.85–7.62)
NEUTS SEG NFR BLD AUTO: 47 % (ref 43–75)
NRBC BLD AUTO-RTO: 2 /100 WBCS
PLATELET # BLD AUTO: 165 THOUSANDS/UL (ref 149–390)
PMV BLD AUTO: 10.6 FL (ref 8.9–12.7)
POTASSIUM SERPL-SCNC: 4.8 MMOL/L (ref 3.5–5.3)
RBC # BLD AUTO: 2.81 MILLION/UL (ref 3.81–5.12)
SODIUM SERPL-SCNC: 138 MMOL/L (ref 135–147)
WBC # BLD AUTO: 5.61 THOUSAND/UL (ref 4.31–10.16)

## 2023-11-28 PROCEDURE — 82948 REAGENT STRIP/BLOOD GLUCOSE: CPT

## 2023-11-28 PROCEDURE — 85025 COMPLETE CBC W/AUTO DIFF WBC: CPT | Performed by: NURSE PRACTITIONER

## 2023-11-28 PROCEDURE — 99239 HOSP IP/OBS DSCHRG MGMT >30: CPT | Performed by: GENERAL PRACTICE

## 2023-11-28 PROCEDURE — 80048 BASIC METABOLIC PNL TOTAL CA: CPT | Performed by: NURSE PRACTITIONER

## 2023-11-28 RX ORDER — GABAPENTIN 300 MG/1
300 CAPSULE ORAL 2 TIMES DAILY
Refills: 0
Start: 2023-11-28

## 2023-11-28 RX ORDER — HYDROCODONE BITARTRATE AND ACETAMINOPHEN 5; 325 MG/1; MG/1
1 TABLET ORAL EVERY 6 HOURS PRN
Qty: 10 TABLET | Refills: 0 | Status: SHIPPED | OUTPATIENT
Start: 2023-11-28

## 2023-11-28 RX ORDER — LIDOCAINE 50 MG/G
1 PATCH TOPICAL DAILY
Qty: 2 PATCH | Refills: 0
Start: 2023-11-28

## 2023-11-28 RX ORDER — CLOPIDOGREL BISULFATE 75 MG/1
75 TABLET ORAL DAILY
Refills: 0
Start: 2023-11-28 | End: 2023-12-08

## 2023-11-28 RX ORDER — PRAVASTATIN SODIUM 10 MG
10 TABLET ORAL
Refills: 0
Start: 2023-11-28

## 2023-11-28 RX ORDER — INSULIN LISPRO 100 [IU]/ML
4 INJECTION, SOLUTION INTRAVENOUS; SUBCUTANEOUS
Refills: 0
Start: 2023-11-28

## 2023-11-28 RX ORDER — INSULIN LISPRO 100 [IU]/ML
1-5 INJECTION, SOLUTION INTRAVENOUS; SUBCUTANEOUS
Refills: 0
Start: 2023-11-28

## 2023-11-28 RX ORDER — ASPIRIN 81 MG/1
81 TABLET, CHEWABLE ORAL DAILY
Refills: 0
Start: 2023-11-29

## 2023-11-28 RX ADMIN — COLCHICINE 0.6 MG: 0.6 TABLET ORAL at 08:15

## 2023-11-28 RX ADMIN — LIDOCAINE 5% 2 PATCH: 700 PATCH TOPICAL at 08:14

## 2023-11-28 RX ADMIN — ALLOPURINOL 150 MG: 300 TABLET ORAL at 08:15

## 2023-11-28 RX ADMIN — GABAPENTIN 300 MG: 300 CAPSULE ORAL at 08:15

## 2023-11-28 RX ADMIN — TORSEMIDE 20 MG: 20 TABLET ORAL at 08:15

## 2023-11-28 RX ADMIN — SENNOSIDES 8.6 MG: 8.6 TABLET, FILM COATED ORAL at 08:15

## 2023-11-28 RX ADMIN — METOPROLOL TARTRATE 25 MG: 25 TABLET, FILM COATED ORAL at 08:15

## 2023-11-28 RX ADMIN — LEVOTHYROXINE SODIUM 100 MCG: 100 TABLET ORAL at 06:05

## 2023-11-28 RX ADMIN — INSULIN LISPRO 1 UNITS: 100 INJECTION, SOLUTION INTRAVENOUS; SUBCUTANEOUS at 11:07

## 2023-11-28 RX ADMIN — PREDNISONE 5 MG: 5 TABLET ORAL at 08:16

## 2023-11-28 RX ADMIN — INSULIN LISPRO 4 UNITS: 100 INJECTION, SOLUTION INTRAVENOUS; SUBCUTANEOUS at 08:16

## 2023-11-28 RX ADMIN — Medication 2 G: at 11:07

## 2023-11-28 RX ADMIN — METHOCARBAMOL 750 MG: 750 TABLET ORAL at 08:15

## 2023-11-28 RX ADMIN — NYSTATIN: 100000 POWDER TOPICAL at 08:16

## 2023-11-28 RX ADMIN — CLOPIDOGREL BISULFATE 75 MG: 75 TABLET ORAL at 08:16

## 2023-11-28 RX ADMIN — Medication 2 G: at 08:17

## 2023-11-28 RX ADMIN — HEPARIN SODIUM 5000 UNITS: 5000 INJECTION INTRAVENOUS; SUBCUTANEOUS at 06:05

## 2023-11-28 RX ADMIN — INSULIN LISPRO 4 UNITS: 100 INJECTION, SOLUTION INTRAVENOUS; SUBCUTANEOUS at 11:07

## 2023-11-28 RX ADMIN — CYANOCOBALAMIN TAB 500 MCG 1000 MCG: 500 TAB at 08:15

## 2023-11-28 RX ADMIN — ASPIRIN 81 MG CHEWABLE TABLET 81 MG: 81 TABLET CHEWABLE at 08:15

## 2023-11-28 RX ADMIN — SERTRALINE 25 MG: 25 TABLET, FILM COATED ORAL at 08:16

## 2023-11-28 RX ADMIN — POLYETHYLENE GLYCOL 3350 17 G: 17 POWDER, FOR SOLUTION ORAL at 08:15

## 2023-11-28 RX ADMIN — ISOSORBIDE MONONITRATE 30 MG: 30 TABLET, EXTENDED RELEASE ORAL at 08:15

## 2023-11-28 NOTE — ASSESSMENT & PLAN NOTE
Lab Results   Component Value Date    EGFR 22 11/28/2023    EGFR 24 11/27/2023    EGFR 27 11/26/2023    CREATININE 1.94 (H) 11/28/2023    CREATININE 1.81 (H) 11/27/2023    CREATININE 1.66 (H) 11/26/2023   Estimated Creatinine Clearance: 18.2 mL/min (A) (by C-G formula based on SCr of 1.94 mg/dL (H)). Known history of CKD baseline cr 1.4 -1.8  Cr appears to be at baseline     Increase torsemide to 40mg evening dose due to lower extremity edema, crackles on exam  Cr and volume status improving on torsemide 40 in the am and 20 mg in the p.m.   Will need to tolerate higher Cr to keep euvolemic

## 2023-11-28 NOTE — RESTORATIVE TECHNICIAN NOTE
Restorative Technician Note      Patient Name: Dalila Medicine     Note Type: Mobility  Patient Position Upon Consult: Supine  Activity Performed: Repositioned  Education Provided: Yes  Patient Position at End of Consult: Supine; Bed/Chair alarm activated;  All needs within reach    Katharine GHOSH, Restorative Technician, [employer]

## 2023-11-28 NOTE — DISCHARGE SUMMARY
[address] [facility]  Discharge- Nyasia Alegria 1934, 80 y.o. female MRN: [id number]  Unit/Bed#: [facility] 710-01 Encounter: [id number]  Primary Care Provider: [place],    Date and time admitted to hospital: 11/18/2023  3:11 PM    * CVA (cerebral vascular accident) [facility])  Assessment & Plan  Patient had prehospital stroke alert initiated. Admitted under stroke pathway  MRI with "Acute lacunar infarct in the right centrum semiovale."  Patient with improving strength in LUE, but continues to have some weakness; especially left LE; difficulty moving it to side step with PT session this am   Continue stroke pathway  Neurology consulted:   Recommend 21 days DAPT  Per echo patient has stable EF of 35% and patient declined LHC in the past and wants more comfort approach  Continue DAPT for 21 days  PT/OT eval completed recommend moderate resource intensity   D/c to STR    Metabolic encephalopathy  Assessment & Plan  With metabolic encephalopathy on admission likely secondary to acute CVA as noted on MRI imaging, continue on dual antiplatelet therapy, lower suspicion for urinary tract infection we will continue to monitor off antibiotics  Resolved; Awake and oriented x 3    Goals of care, counseling/discussion  Assessment & Plan  Of note, patient is followed by [facility] palliative care. Note patient's outpatient notes. Confirmed with granddaughter that she is a level 3 DNR. Lea Villanueva She has been followed by [facility] palliative care due to history of heart failure. Family is agreeable to stroke pathway work-up and recommendations. Patient presents with left-sided weakness   Continues with left sided weakness; difficulty ambulating due to left leg weakness.    STR recommended    Hyperlipidemia  Assessment & Plan  Patient reportedly intolerant to statins, but in review of her recent meds she does not appear to have trialed too many low intensity statins, previous provider discussed with patient and family at bedside and will try lowest dose pravastatin at 10 mg daily at this time    DM2 (diabetes mellitus, type 2) ([address])  Assessment & Plan    Lab Results   Component Value Date    HGBA1C 7.0 (H) 11/19/2023   Known history   Continue Sliding-scale insulin and blood glucose monitoring  CC diet     Rheumatoid arthritis (HCC)  Assessment & Plan  Continue prednisone 5 mg daily  Patient on lidoderm patches at home 2 daily to each shoulder. Discussed with daughter would continue at discharge for improved mobility at discharge     Stage 4 chronic kidney disease [facility])  Assessment & Plan  Lab Results   Component Value Date    EGFR 22 11/28/2023    EGFR 24 11/27/2023    EGFR 27 11/26/2023    CREATININE 1.94 (H) 11/28/2023    CREATININE 1.81 (H) 11/27/2023    CREATININE 1.66 (H) 11/26/2023   Estimated Creatinine Clearance: 18.2 mL/min (A) (by C-G formula based on SCr of 1.94 mg/dL (H)). Known history of CKD baseline cr 1.4 -1.8  Cr appears to be at baseline     Increase torsemide to 40mg evening dose due to lower extremity edema, crackles on exam  Cr and volume status improving on torsemide 40 in the am and 20 mg in the p.m. Will need to tolerate higher Cr to keep euvolemic       SLE (systemic lupus erythematosus) ([address])  Assessment & Plan  Currently on prednisone. Daughter confirms that she is taking 5 mg daily    Chronic combined systolic and diastolic heart failure (HCC)  Assessment & Plan  Wt Readings from Last 3 Encounters:   11/20/23 73.9 kg (163 lb)   11/19/23 73.9 kg (163 lb)   09/05/23 72.6 kg (160 lb)     Known hx with LVEF 35%  Continue torsemide 40 mg in am and 20 mg in pm   I/O, low sodium diet, and daily weights           Hypothyroidism  Assessment & Plan  Levothyroxine 100 daily. Patient previously was taking 112 which was changed to 100.     Continued on levothyroxine at 100 mcg daily        Medical Problems       Resolved Problems  Date Reviewed: 11/27/2023            Resolved    Lethargy 11/20/2023 Resolved by  Harrison Senior        Discharging Physician / Practitioner: Chelsea Sanchez DO  PCP: Prosper Hutton DO  Admission Date:   Admission Orders (From admission, onward)       Ordered        11/18/23 [facility]  Once                          Discharge Date: 11/28/23    Consultations During Hospital Stay:  Neuro  Wound care    Procedures Performed:   none    Significant Findings / Test Results:   See above    Incidental Findings:   none       Test Results Pending at Discharge (will require follow up):   none     Outpatient Tests Requested:  Rehab should monitor BMP 7-7F/VCMD    Complications:  none    Reason for Admission: CVA-like symptoms    Hospital Course:   Jocelyn Alvarez is a 80 y.o. female patient who originally presented to the hospital on 11/18/2023 due to stroke. MRI confirmed stroke. Patient put on 21 days of dual antiplatelet therapy and then will be on monotherapy with aspirin. For heart failure, torsemide was increased to a total of 60 mg daily. Will need to tolerate a higher creatinine to keep euvolemic. Please see above list of diagnoses and related plan for additional information. Condition at Discharge: stable    Discharge Day Visit / Exam:   Subjective:  No acute complaints  Vitals: Blood Pressure: 135/52 (11/28/23 0717)  Pulse: 60 (11/28/23 0717)  Temperature: 98.1 °F (36.7 °C) (11/28/23 0717)  Temp Source: Axillary (11/27/23 0730)  Respirations: 18 (11/27/23 2007)  Height: 4' 11" (149.9 cm) (11/20/23 1515)  Weight - Scale: 73.9 kg (163 lb) (11/20/23 1515)  SpO2: 92 % (11/28/23 0717)  Exam:   Physical Exam  HENT:      Head: Normocephalic and atraumatic. Nose: Nose normal.      Mouth/Throat:      Mouth: Mucous membranes are moist.   Eyes:      Extraocular Movements: Extraocular movements intact. Conjunctiva/sclera: Conjunctivae normal.   Cardiovascular:      Rate and Rhythm: Normal rate and regular rhythm.    Pulmonary:      Effort: Pulmonary effort is normal.      Breath sounds: Normal breath sounds. Abdominal:      General: Bowel sounds are normal.      Palpations: Abdomen is soft. Musculoskeletal:         General: Normal range of motion. Cervical back: Normal range of motion and neck supple. Right lower leg: No edema. Left lower leg: No edema. Skin:     General: Skin is warm and dry. Neurological:      Mental Status: She is alert and oriented to person, place, and time. Discussion with Family: Updated  (daughter) via phone. Discharge instructions/Information to patient and family:   See after visit summary for information provided to patient and family. Provisions for Follow-Up Care:  See after visit summary for information related to follow-up care and any pertinent home health orders. Mobility at time of Discharge:   Basic Mobility Inpatient Raw Score: 8  -[facility] Goal: 3: Sit at edge of bed  -[facility] Achieved: 3: Sit at edge of bed  [facility] Goal achieved. Continue to encourage appropriate mobility. Disposition:   Other [address] Readmission: no     Discharge Statement:  I spent 40 minutes discharging the patient. This time was spent on the day of discharge. I had direct contact with the patient on the day of discharge. Greater than 50% of the total time was spent examining patient, answering all patient questions, arranging and discussing plan of care with patient as well as directly providing post-discharge instructions. Additional time then spent on discharge activities. Discharge Medications:  See after visit summary for reconciled discharge medications provided to patient and/or family.       **Please Note: This note may have been constructed using a voice recognition system**

## 2023-11-28 NOTE — RESTORATIVE TECHNICIAN NOTE
Restorative Technician Note      Patient Name: Grecia Alfaro     Note Type: Mobility  Patient Position Upon Consult: Supine  Activity Performed: Repositioned  Education Provided: Yes  Patient Position at End of Consult: Supine;  All needs within reach; Bed/Chair alarm activated      Madelaine GHOSH, Restorative Technician, [employer]

## 2023-11-28 NOTE — CASE MANAGEMENT
Case Management Discharge Planning Note    Patient name Marcel Older  [facility] [address] [facility] [id number]  : 1934 Date 2023       Current Admission Date: 2023  Current Admission Diagnosis:CVA (cerebral vascular accident) [facility])   Patient Active Problem List    Diagnosis Date Noted    Metabolic encephalopathy     CVA (cerebral vascular accident) ([address]) 2023    Goals of care, counseling/discussion 2023    Fall 2023    Pressure injury of deep tissue 2023    Pancreatic lesion 2023    Rheumatoid arthritis ([address]) 2023    DM2 (diabetes mellitus, type 2) ([address]) 2023    History of renal artery stenosis 2023    Family history of gout 2023    Peripheral polyneuropathy 2023    Hypertension 2023    Lupus ([address]) 2023    Hyperlipidemia 2023    History of stroke 2023    Hemorrhoids 2023    Type 2 diabetes mellitus with diabetic neuropathy, without long-term current use of insulin ([address]) 02/15/2023    Primary hypertension 02/15/2023    Class 1 obesity due to excess calories without serious comorbidity with body mass index (BMI) of 33.0 to 33.9 in adult 02/15/2023    Thoracic spine fracture ([address]) 2023    Perineal hematoma 2023    Cervical spine fracture ([address]) 02/10/2023    Contusion 02/10/2023    CHF (congestive heart failure) ([address]) 02/10/2023    CKD (chronic kidney disease) stage 4, GFR 15-29 ml/min ([address]) 02/10/2023    Hypothyroidism 02/10/2023    Fall 2023    Ambulatory dysfunction 2022    Chronic midline low back pain without sciatica 2022    COVID-19 2022    Fall 2022    Fall 2022    Hypothyroidism 2022    DM type 2 (diabetes mellitus, type 2) ([address]) 2022    AZUL (obstructive sleep apnea) 2022    Essential hypertension 2022    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 ([address]) 2022    Renal artery stenosis ([address]) 2022    Lab test positive for detection of COVID-19 virus 05/19/2022    Recurrent UTI 09/28/2021    Multiple drug resistant organism (MDRO) culture positive 09/28/2021    HTN (hypertension), benign 12/14/2020    Chronic gout without tophus 99/43/4744    Toxic metabolic encephalopathy 66/68/1473    Aspiration pneumonia due to regurgitated food ([address]) 09/29/2020    Macrocytic anemia 09/29/2020    Nephrosclerosis arteriolar, stage 1-4 or unspecified chronic kidney disease 05/29/2020    Encounter for follow-up examination after completed treatment for malignant neoplasm 05/19/2020    Seasonal allergic rhinitis due to pollen 03/24/2020    Wheezing 11/04/2019    Stage 4 chronic kidney disease ([address]) 10/29/2019    Alkalosis 10/29/2019    Chronic respiratory failure with hypoxia ([address]) 10/21/2019    Chronic anemia 10/19/2019    SDH (subdural hematoma) ([address]) 10/19/2019    AZUL (obstructive sleep apnea) 10/19/2019    H/O spinal fusion 07/23/2019    Hyponatremia 01/21/2019    Chronic combined systolic and diastolic heart failure ([address]) 01/17/2019    Depression 01/17/2019    Essential hypertension 01/09/2019    Controlled type 2 diabetes mellitus with diabetic neuropathy, without long-term current use of insulin ([address]) 10/24/2017    SLE (systemic lupus erythematosus) ([address]) 10/24/2017    Vitamin D deficiency 08/22/2017    Renal artery stenosis ([address]) 02/08/2017    Peripheral polyneuropathy 02/07/2017    Hypothyroidism 09/23/2016    H/O: CVA (cerebrovascular accident) 09/23/2016    History of malignant neoplasm of breast 09/23/2016    History of aortic valve replacement 07/21/2015    RA (rheumatoid arthritis) ([address]) 07/21/2015    Hyperlipidemia 10/01/2013    Osteoporosis 10/01/2013    Obesity (BMI 30.0-34.9) 10/12/2012      LOS (days): 10  Geometric Mean LOS (GMLOS) (days): 4.60  Days to GMLOS:-5.1     OBJECTIVE:  Risk of Unplanned Readmission Score: 49.63         Current admission status: Inpatient   Preferred Pharmacy:   [facility]/pharmacy [address] [address], [place] - [address]  [address] [place] [address]  Phone: [phone] Fax: [address], [address] [place] [address],[address]  [facility] [address]  Phone: [phone] Fax: [phone]    [address] [place] [address], [address] [place] [address]  Phone: [phone] Fax: [phone]    PATIENT/FAMILY REPORTS NO PREFERRED PHARMACY  No address on file      Primary Care Provider: Jeniffer Mchugh DO    Primary Insurance: [facility]  Secondary Insurance:     DISCHARGE DETAILS:       Other Referral/Resources/Interventions Provided:  Interventions: SNF, Short Term Rehab  Referral Comments: Transportation confirmed for 1300.   Provider, patient, Juliette Leiva, and family upated       Treatment Team Recommendation: SNF, Short Term Rehab  Discharge Destination Plan[de-identified] SNF, Short Term Rehab  Transport at Discharge : Susie Richards     Number/Name of Dispatcher: Omari Reyes and Unit #): Miguel Imelda [phone]  ETA of Transport (Date): 11/28/23  ETA of Transport (Time): 1300

## 2023-11-28 NOTE — ASSESSMENT & PLAN NOTE
Of note, patient is followed by [facility] palliative care. Note patient's outpatient notes. Confirmed with granddaughter that she is a level 3 DNR. Eduin Winter She has been followed by [facility] palliative care due to history of heart failure. Family is agreeable to stroke pathway work-up and recommendations. Patient presents with left-sided weakness   Continues with left sided weakness; difficulty ambulating due to left leg weakness.    STR recommended

## 2023-11-28 NOTE — ASSESSMENT & PLAN NOTE
Patient had prehospital stroke alert initiated.  Admitted under stroke pathway  MRI with "Acute lacunar infarct in the right centrum semiovale."  Patient with improving strength in LUE, but continues to have some weakness; especially left LE; difficulty moving it to side step with PT session this am   Continue stroke pathway  Neurology consulted:   Recommend 21 days DAPT  Per echo patient has stable EF of 35% and patient declined LHC in the past and wants more comfort approach  Continue DAPT for 21 days  PT/OT eval completed recommend moderate resource intensity   D/c to STR

## 2023-11-28 NOTE — CASE MANAGEMENT
Case Management Discharge Planning Note    Patient name Mario Montelongo  Location [address] [place] [address] [facility] [id number]  : 1934 Date 2023       Current Admission Date: 2023  Current Admission Diagnosis:CVA (cerebral vascular accident) [facility])   Patient Active Problem List    Diagnosis Date Noted    Metabolic encephalopathy     CVA (cerebral vascular accident) ([address]) 2023    Goals of care, counseling/discussion 2023    Fall 2023    Pressure injury of deep tissue 2023    Pancreatic lesion 2023    Rheumatoid arthritis ([address]) 2023    DM2 (diabetes mellitus, type 2) ([address]) 2023    History of renal artery stenosis 2023    Family history of gout 2023    Peripheral polyneuropathy 2023    Hypertension 2023    Lupus ([address]) 2023    Hyperlipidemia 2023    History of stroke 2023    Hemorrhoids 2023    Type 2 diabetes mellitus with diabetic neuropathy, without long-term current use of insulin ([address]) 02/15/2023    Primary hypertension 02/15/2023    Class 1 obesity due to excess calories without serious comorbidity with body mass index (BMI) of 33.0 to 33.9 in adult 02/15/2023    Thoracic spine fracture ([address]) 2023    Perineal hematoma 2023    Cervical spine fracture ([address]) 02/10/2023    Contusion 02/10/2023    CHF (congestive heart failure) ([address]) 02/10/2023    CKD (chronic kidney disease) stage 4, GFR 15-29 ml/min ([address]) 02/10/2023    Hypothyroidism 02/10/2023    Fall 2023    Ambulatory dysfunction 2022    Chronic midline low back pain without sciatica 2022    COVID-19 2022    Fall 2022    Fall 2022    Hypothyroidism 2022    DM type 2 (diabetes mellitus, type 2) ([address]) 2022    AZUL (obstructive sleep apnea) 2022    Essential hypertension 2022    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 ([address]) 2022    Renal artery stenosis ([address]) 2022    Lab test positive for detection of COVID-19 virus 05/19/2022    Recurrent UTI 09/28/2021    Multiple drug resistant organism (MDRO) culture positive 09/28/2021    HTN (hypertension), benign 12/14/2020    Chronic gout without tophus 31/38/8487    Toxic metabolic encephalopathy 46/35/9395    Aspiration pneumonia due to regurgitated food ([address]) 09/29/2020    Macrocytic anemia 09/29/2020    Nephrosclerosis arteriolar, stage 1-4 or unspecified chronic kidney disease 05/29/2020    Encounter for follow-up examination after completed treatment for malignant neoplasm 05/19/2020    Seasonal allergic rhinitis due to pollen 03/24/2020    Wheezing 11/04/2019    Stage 4 chronic kidney disease ([address]) 10/29/2019    Alkalosis 10/29/2019    Chronic respiratory failure with hypoxia ([address]) 10/21/2019    Chronic anemia 10/19/2019    SDH (subdural hematoma) ([address]) 10/19/2019    AZUL (obstructive sleep apnea) 10/19/2019    H/O spinal fusion 07/23/2019    Hyponatremia 01/21/2019    Chronic combined systolic and diastolic heart failure ([address]) 01/17/2019    Depression 01/17/2019    Essential hypertension 01/09/2019    Controlled type 2 diabetes mellitus with diabetic neuropathy, without long-term current use of insulin ([address]) 10/24/2017    SLE (systemic lupus erythematosus) ([address]) 10/24/2017    Vitamin D deficiency 08/22/2017    Renal artery stenosis ([address]) 02/08/2017    Peripheral polyneuropathy 02/07/2017    Hypothyroidism 09/23/2016    H/O: CVA (cerebrovascular accident) 09/23/2016    History of malignant neoplasm of breast 09/23/2016    History of aortic valve replacement 07/21/2015    RA (rheumatoid arthritis) ([address]) 07/21/2015    Hyperlipidemia 10/01/2013    Osteoporosis 10/01/2013    Obesity (BMI 30.0-34.9) 10/12/2012      LOS (days): 10  Geometric Mean LOS (GMLOS) (days): 4.60  Days to GMLOS:-5     OBJECTIVE:  Risk of Unplanned Readmission Score: 49.63         Current admission status: Inpatient   Preferred Pharmacy:   [facility]/pharmacy [address] [address], [place] - [address]  [address] [place] [address]  Phone: [phone] Fax: [facility], [address] [facility] [address]  [facility] [address]  Phone: [phone] Fax: [phone]    [address] [place] [address], [address] [place] [address]  Phone: [phone] Fax: [phone]    PATIENT/FAMILY REPORTS NO PREFERRED PHARMACY  No address on file      Primary Care Provider: Kalyani Tellez DO    Primary Insurance: Dima Carreon [address] [facility],[address]:     [address] [place] [address] Rd Number: [id number]

## 2023-11-28 NOTE — CASE MANAGEMENT
Case Management Discharge Planning Note    Patient name Cass Cone  Location [address] [place] [address] Road [facility] [id number]  : 1934 Date 2023       Current Admission Date: 2023  Current Admission Diagnosis:CVA (cerebral vascular accident) [facility])   Patient Active Problem List    Diagnosis Date Noted    Metabolic encephalopathy     CVA (cerebral vascular accident) ([address]) 2023    Goals of care, counseling/discussion 2023    Fall 2023    Pressure injury of deep tissue 2023    Pancreatic lesion 2023    Rheumatoid arthritis ([address]) 2023    DM2 (diabetes mellitus, type 2) ([address]) 2023    History of renal artery stenosis 2023    Family history of gout 2023    Peripheral polyneuropathy 2023    Hypertension 2023    Lupus ([address]) 2023    Hyperlipidemia 2023    History of stroke 2023    Hemorrhoids 2023    Type 2 diabetes mellitus with diabetic neuropathy, without long-term current use of insulin ([address]) 02/15/2023    Primary hypertension 02/15/2023    Class 1 obesity due to excess calories without serious comorbidity with body mass index (BMI) of 33.0 to 33.9 in adult 02/15/2023    Thoracic spine fracture ([address]) 2023    Perineal hematoma 2023    Cervical spine fracture ([address]) 02/10/2023    Contusion 02/10/2023    CHF (congestive heart failure) ([address]) 02/10/2023    CKD (chronic kidney disease) stage 4, GFR 15-29 ml/min ([address]) 02/10/2023    Hypothyroidism 02/10/2023    Fall 2023    Ambulatory dysfunction 2022    Chronic midline low back pain without sciatica 2022    COVID-19 2022    Fall 2022    Fall 2022    Hypothyroidism 2022    DM type 2 (diabetes mellitus, type 2) ([address]) 2022    AZUL (obstructive sleep apnea) 2022    Essential hypertension 2022    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 ([address]) 2022    Renal artery stenosis ([address]) 2022    Lab test positive for detection of COVID-19 virus 05/19/2022    Recurrent UTI 09/28/2021    Multiple drug resistant organism (MDRO) culture positive 09/28/2021    HTN (hypertension), benign 12/14/2020    Chronic gout without tophus 45/96/3003    Toxic metabolic encephalopathy 20/31/2925    Aspiration pneumonia due to regurgitated food ([address]) 09/29/2020    Macrocytic anemia 09/29/2020    Nephrosclerosis arteriolar, stage 1-4 or unspecified chronic kidney disease 05/29/2020    Encounter for follow-up examination after completed treatment for malignant neoplasm 05/19/2020    Seasonal allergic rhinitis due to pollen 03/24/2020    Wheezing 11/04/2019    Stage 4 chronic kidney disease ([address]) 10/29/2019    Alkalosis 10/29/2019    Chronic respiratory failure with hypoxia ([address]) 10/21/2019    Chronic anemia 10/19/2019    SDH (subdural hematoma) ([address]) 10/19/2019    AZUL (obstructive sleep apnea) 10/19/2019    H/O spinal fusion 07/23/2019    Hyponatremia 01/21/2019    Chronic combined systolic and diastolic heart failure ([address]) 01/17/2019    Depression 01/17/2019    Essential hypertension 01/09/2019    Controlled type 2 diabetes mellitus with diabetic neuropathy, without long-term current use of insulin ([address]) 10/24/2017    SLE (systemic lupus erythematosus) ([address]) 10/24/2017    Vitamin D deficiency 08/22/2017    Renal artery stenosis ([address]) 02/08/2017    Peripheral polyneuropathy 02/07/2017    Hypothyroidism 09/23/2016    H/O: CVA (cerebrovascular accident) 09/23/2016    History of malignant neoplasm of breast 09/23/2016    History of aortic valve replacement 07/21/2015    RA (rheumatoid arthritis) ([address]) 07/21/2015    Hyperlipidemia 10/01/2013    Osteoporosis 10/01/2013    Obesity (BMI 30.0-34.9) 10/12/2012      LOS (days): 10  Geometric Mean LOS (GMLOS) (days): 4.60  Days to GMLOS:-5.1     OBJECTIVE:  Risk of Unplanned Readmission Score: 49.63         Current admission status: Inpatient   Preferred Pharmacy:   [facility]/pharmacy [address] [address], [place] - [address] [facility] [address]  [address] [place] [address]  Phone: [phone] Fax: [address] [facility], [address] [facility] [address]  [facility] [address]  Phone: [phone] Fax: [phone]    [address] [place] [address], [address] [place] [address]  [address] [place] [address]  Phone: [phone] Fax: [phone]    PATIENT/FAMILY REPORTS NO PREFERRED PHARMACY  No address on file      Primary Care Provider: Amy Noguera DO    Primary Insurance: [facility]  Secondary Insurance:     DISCHARGE DETAILS:    Other Referral/Resources/Interventions Provided:  Interventions: SNF, Short Term Rehab  Referral Comments: Transportation confirmed for 1300.   Provider, patient, Marisa Barrett, and family upated       Treatment Team Recommendation: SNF, Short Term Rehab  Discharge Destination Plan[de-identified] SNF, Short Term Rehab  Transport at Discharge : Royal Screen     Number/Name of Dispatcher: Reji Reyes and Unit #): Saran Stephenedu [phone]  ETA of Transport (Date): 11/28/23  ETA of Transport (Time): 1300        IMM Given (Date):: 11/28/23  IMM Given to[de-identified] Patient   Accepting Facility Name, [facility] : Garcia Nilson  Receiving Facility/Agency Phone Number: [phone]  Facility/Agency Fax Number: [phone]

## 2023-11-28 NOTE — CASE MANAGEMENT
[facility] has received approved authorization from insurance:   Alec Ro in by Rep:april P#  [phone]  Authorization received for: [facility]  Facility: [facility] #:[id number]   Start of Care:11/28  Next Review Date:12/04  Continued Stay Care Coordinator:  Tom Camilo  P#: [phone]   Submit next review to: fax [phone]   Care Manager notified:dilcia butler

## 2023-12-04 ENCOUNTER — TELEPHONE (OUTPATIENT)
Dept: NEUROLOGY | Facility: CLINIC | Age: 88
End: 2023-12-04

## 2023-12-04 NOTE — TELEPHONE ENCOUNTER
VENECIA/11/18-11/28/CVA    Warren Clemente will need follow up in 6 weeks with neurovascular and memory attending/AP . She might require MOCA testing outpatient.  Defer work-up to outpatient provider     HFU scheduled for 1/8/24 @ 10:00 with Gena CERVANTES

## 2023-12-04 NOTE — TELEPHONE ENCOUNTER
Post CVA Discharge Follow Up  Hospitalization: 11/18/23-11/28/23    According to chart, patient discharged to [facility]. Called facility. Spoke with the nurse, Brock Angel. She denies any new or worsening stroke-like symptoms since discharge. Ambulation / ADLs:  Patient mobilizing via mark lift. She continues to require assistance with ADLs and transfers. Patient maintained on a regular consistency diet  She remains incontinent at this time. Medication Review:  Reviewed medications with them. Reviewed ASA and plavix instructions. Per chart, plavix is for 10 days after discharge. "Continue DAPT for 21 days, follow with ASA monotherapy from 12/9/23." No reported missed doses, medication side effects, or signs of bleeding. Last reported /80  Last reported blood glucose 234    Appointments:  Offered to schedule stroke HFU. She reports the unit clerk will return the call to schedule. She is currently away from her desk at this time. Provided the office's phone number. There is no estimated discharge date at this time.

## 2023-12-04 NOTE — UTILIZATION REVIEW
NOTIFICATION OF ADMISSION DISCHARGE   This is a Notification of Discharge from [address] [facility]. Please be advised that this patient has been discharge from our facility. Below you will find the admission and discharge date and time including the patient’s disposition. UTILIZATION REVIEW CONTACT:  Acosta Fernandes  Utilization   Network Utilization Review Department  Phone: [phone] x carefully listen to the prompts. All voicemails are confidential.  Email: [email or web address]     ADMISSION INFORMATION  PRESENTATION DATE: 11/18/2023  3:11 PM  OBERVATION ADMISSION DATE:   INPATIENT ADMISSION DATE: 11/18/23  5:21 PM   DISCHARGE DATE: 11/28/2023  1:34 PM   DISPOSITION:[facility] SNF/TCU/SNU    Network Utilization Review Department  ATTENTION: Please call with any questions or concerns to [phone] and carefully listen to the prompts so that you are directed to the right person. All voicemails are confidential.   For Discharge needs, contact Care Management DC Support Team at [phone] opt. 2  Send all requests for admission clinical reviews, approved or denied determinations and any other requests to dedicated fax number below belonging to the campus where the patient is receiving treatment.  List of dedicated fax numbers for the Facilities:  [place] DENIALS (Administrative/Medical Necessity) [phone]   DISCHARGE SUPPORT TEAM (Network) [phone] [address] [facility] (Maternity/NICU/Pediatrics) [phone]   [address] [facility] [address] [facility] [address] [place] [address] [address]  [facility] [phone]

## 2023-12-20 ENCOUNTER — TELEPHONE (OUTPATIENT)
Dept: CARDIOLOGY CLINIC | Facility: CLINIC | Age: 88
End: 2023-12-20

## 2023-12-20 NOTE — TELEPHONE ENCOUNTER
Will have Chiqui Reina RN called mauricio tomorrow to get correct dose of Bumex pt will be taking.

## 2023-12-20 NOTE — TELEPHONE ENCOUNTER
FYI- Daughter called to let you know pt's wt is up to 175 lbs today. She does have BLLE edema. Denies any other CHF symptom.     She has not been seen in over a year.      Her wt was 163 lbs ( on 11/20/23). Was d/c'd on 11/28/23 to rehab. And daughter states at home she was 158 lbs.  Appetite slightly improved, but not that much.    She is presently at [facility] for the past 2 weeks and is set to go home on Saturday.  Will have Chiqui call the facility on Friday to get update on wt and status.  Also will ask her to call  daughter on 12/26/23 when pt is home.    In rehab she had been taking Torsemide 40 mg bid. Since wt is up they will change that to Bumex 2 mg qd starting tomorrow.  Daughter states she had bad reaction to metolazone in the past.      Will have them fax over most recent labs to the office. Fax # given to Nicolette MOYER).    Daughter will call on 12/26/23 to set up virtual appt due to pt not able to get around.They will have palliative care with Terry when she goes home.       Thank you

## 2023-12-22 ENCOUNTER — TELEPHONE (OUTPATIENT)
Dept: CARDIOLOGY CLINIC | Facility: CLINIC | Age: 88
End: 2023-12-22

## 2023-12-22 NOTE — TELEPHONE ENCOUNTER
F/U call to Vicente for an update on patient's level of edema & cardiac symptoms. Left a message for her to return call to office.

## 2023-12-22 NOTE — TELEPHONE ENCOUNTER
Vicente [facility] DON returned call & stated patient is being discharged from [facility] tomorrow. Stated their physician will examine patient & determine if she needs any extra torsemide or other medications ordered before her next OV with Dr Llamas.

## 2023-12-26 NOTE — TELEPHONE ENCOUNTER
Patient's daughter Teri called office stating patient has been home from [facility] since Friday. Daughter is providing care as well as [facility] nurse.    Teri stated when patient was discharged from [facility], 12/22, she was not made aware of Dr Llamas's order for patient's Bumex 2 mg BID. The discharge order stated Bumex 2 mg daily, as was given to patient in the facility, according to Teri.    Teri is asking if Dr Llamas prefers patient to take Torsemide 40 mg BID over Bumex 2 mg BID.     Teri stated since home, edema to patient's face, hands, & legs/feet is greatly improved. Although, states patient's abdomen is distended & firm to touch.    States patient remains non weight bearing & can not weigh patient.    Patient has been experiencing intermittent left sided leg cramping/spasms from groin to left heel. Teri is questioning if Pravastatin 10 mg daily ordered 11/27 in hospital & patient taking since could be causing these symptoms.     Patient's left side was affected by CVA.    Please advise.

## 2023-12-27 NOTE — TELEPHONE ENCOUNTER
Patient's daughter Trei called asking if you prefer patient to take Torsemide 40 mg BID or Bumex 2 mg BID.    Please advise.

## 2023-12-29 ENCOUNTER — TELEPHONE (OUTPATIENT)
Dept: NEUROLOGY | Facility: CLINIC | Age: 88
End: 2023-12-29

## 2023-12-29 NOTE — TELEPHONE ENCOUNTER
If necessary yes we can do virtual (assuming they have video capability) to review the admission. However obviously the neurologic exam will be limited.

## 2023-12-29 NOTE — TELEPHONE ENCOUNTER
Post CVA Discharge Follow Up  Hospitalization: 11/18/23-11/28/23    Called patient's primary phone number which is her daughter Teri. She denies the patient experiencing any new or worsening stroke-like symptoms since discharge. Patient recently was discharge from the rehab center to Teri's home. They were able to obtain a hospital bed and a mark lift.     Patient continues to have the following symptoms: left sided weakness. Reports some improvement since discharge from the hospital.      Reports she is waiting for a call back from the cardiologist office regarding the bumex management and weight management. She will ask them how to obtain a daily weight since the patient is bedbound and requires a mark lift for transfers.     Patient requires complete care. She is receiving home health services with [facility]. She had her first home PT session today. Reports a nurse practitioner from [facility] (for her heart failure) will be making a home visit next week.     Reviewed appointments. Reports patient is scheduled for a virtual visit with PCP, Dr. Uzma Salinas, next week. She states how the patient is only able to do virtual appointments since she is bedbound. She is requesting for the neurology appointment to be virtual or they will not be able to attend. Will send message to the provider.    She reports during rehab, the patient started to experience muscle ache/spasms in her left groin that would go into left leg and her left foot. The pain is severe when she would experience a spasm. Patient experiences constant pain in her left leg with intermittent spasms. Robaxin and norco helps with the pain. Reports the patient has a long history of statin intolerance and she believes the pravastatin could be the culprit. Admits to stopping the pravastatin two days ago. Will send a message to Dr. Norton for advice.    During this call, we reviewed stroke type, symptoms, personal risk factors and management,  medications. Mailed stroke education booklet and magnet.    As for risk factors, she will start checking the patient's BP at home.   She is a non smoker.   Reports the patient was discharged with insulin from the rehab facility, but patient is not taking the insulin. She inquired if the patient needs to be on the insulin. Reports her fasting BS is around 143 and about one and a half hour post prandial BS is around 240. Instructed her to discuss with the PCP for BS management. She agreed.     All of her questions were addressed. At the conclusion of the conversation, she denies having any further questions or concerns.

## 2024-01-02 ENCOUNTER — TELEPHONE (OUTPATIENT)
Dept: CARDIOLOGY CLINIC | Facility: CLINIC | Age: 89
End: 2024-01-02

## 2024-01-02 DIAGNOSIS — I50.42 CHRONIC COMBINED SYSTOLIC AND DIASTOLIC HEART FAILURE (HCC): Primary | ICD-10-CM

## 2024-01-02 DIAGNOSIS — I50.32 CHRONIC DIASTOLIC CHF (CONGESTIVE HEART FAILURE), NYHA CLASS 2 (HCC): Primary | ICD-10-CM

## 2024-01-02 RX ORDER — BUMETANIDE 2 MG/1
2 TABLET ORAL 2 TIMES DAILY
Qty: 60 TABLET | Refills: 3 | Status: SHIPPED | OUTPATIENT
Start: 2024-01-02

## 2024-01-02 NOTE — TELEPHONE ENCOUNTER
Requested medication(s) are due for refill today: Yes  Patient has already received a courtesy refill: No  Other reason request has been forwarded to provider: Failed Protocol

## 2024-01-03 NOTE — TELEPHONE ENCOUNTER
So statins tend to cause muscle or joint pain not so much muscle spasm and not typically in only 1 leg.  If she has already stopped the pravastatin that is fine, I would suggest we give it 1 to 2 weeks with no other adjustment in treatment to see if the spasms change/go away.  If they do not then it was never the pravastatin in the first place and I would recommend restarting.

## 2024-01-03 NOTE — TELEPHONE ENCOUNTER
Called patient and spoke with Teri, patient's daughter. Notified her of Dr. Norton's and Gena's responses. She expressed understanding. She explained how she canceled the appointment on 1/8/24 via [employer] since she wasn't sure the provider would agree with a virtual, she is requesting to reschedule the appointment to a Friday. Rescheduled appointment to next available Friday on 1/26/24. She is very appreciative how the provider approved for a virtual appointment. She is requesting for the office to call her mobile number, [phone], for the virtual appointment. Denies any further questions or concerns.

## 2024-01-06 NOTE — TELEPHONE ENCOUNTER
Returned call to patient's daughter Teri who stated patient presently being examined by maury Shipman NP from [facility].    Requesting lab work for CMP, CBC, & Magnesium.    Plavix was D/C' d during rehab. Patient continues to take ASA 81 mg daily.   Teri wanted you to be aware of this & asking are you okay w/Plavix D/C' d.    Prednisone 5 mg daily was restarted due to Lupus & multiple pain sites.    Taking Bumex 2 mg BID.   Overnight urine output was way down, according to Teri. Stated patient's diaper was not saturated as usually is with urine.    States patient has intermittent wheezing when turning & repositioning.     Teri stated Pravastatin D/C' d due to patient not tolerating it well.    Please advise.   This document is complete and the patient is ready for discharge.

## 2024-01-11 DIAGNOSIS — I50.42 CHRONIC COMBINED SYSTOLIC AND DIASTOLIC HEART FAILURE (HCC): ICD-10-CM

## 2024-01-11 RX ORDER — BUMETANIDE 2 MG/1
2 TABLET ORAL 2 TIMES DAILY
Qty: 180 TABLET | Refills: 3 | Status: SHIPPED | OUTPATIENT
Start: 2024-01-11

## 2024-01-11 NOTE — TELEPHONE ENCOUNTER
Requested medication(s) are due for refill today: y  Patient has already received a courtesy refill: n  Other reason request has been forwarded to provider: allergy

## 2024-01-23 ENCOUNTER — TELEPHONE (OUTPATIENT)
Dept: CARDIOLOGY CLINIC | Facility: CLINIC | Age: 89
End: 2024-01-23

## 2024-01-23 NOTE — TELEPHONE ENCOUNTER
Pt her [facility] and [facility] not [facility] the script for Bumex that was sent in.  Gave verbal of same order over the phone.  Called daughter back to let her know.   [facility] will call her when it is sent out.

## 2024-01-24 ENCOUNTER — TELEPHONE (OUTPATIENT)
Dept: CARDIOLOGY CLINIC | Facility: CLINIC | Age: 89
End: 2024-01-24

## 2024-01-24 NOTE — TELEPHONE ENCOUNTER
Reddy from [facility] called and was concerned about bp of 144/52.  Called her back and advised the diastolic is not concerning at 52.    Advised daughter of same.

## 2024-01-26 ENCOUNTER — TELEPHONE (OUTPATIENT)
Dept: NEUROLOGY | Facility: CLINIC | Age: 89
End: 2024-01-26

## 2024-02-14 NOTE — PLAN OF CARE
Problem: CARDIOVASCULAR - ADULT  Goal: Maintains optimal cardiac output and hemodynamic stability  Description  INTERVENTIONS:  - Monitor I/O, vital signs and rhythm  - Monitor for S/S and trends of decreased cardiac output  - Administer and titrate ordered vasoactive medications to optimize hemodynamic stability  - Assess quality of pulses, skin color and temperature  - Assess for signs of decreased coronary artery perfusion  - Instruct patient to report change in severity of symptoms  Outcome: Progressing  Goal: Absence of cardiac dysrhythmias or at baseline rhythm  Description  INTERVENTIONS:  - Continuous cardiac monitoring, vital signs, obtain 12 lead EKG if ordered  - Administer antiarrhythmic and heart rate control medications as ordered  - Monitor electrolytes and administer replacement therapy as ordered  Outcome: Progressing     Problem: RESPIRATORY - ADULT  Goal: Achieves optimal ventilation and oxygenation  Description  INTERVENTIONS:  - Assess for changes in respiratory status  - Assess for changes in mentation and behavior  - Position to facilitate oxygenation and minimize respiratory effort  - Oxygen administered by appropriate delivery if ordered  - Initiate smoking cessation education as indicated  - Encourage broncho-pulmonary hygiene including cough, deep breathe, Incentive Spirometry  - Assess the need for suctioning and aspirate as needed  - Assess and instruct to report SOB or any respiratory difficulty  - Respiratory Therapy support as indicated  Outcome: Progressing     Problem: GASTROINTESTINAL - ADULT  Goal: Maintains adequate nutritional intake  Description  INTERVENTIONS:  - Monitor percentage of each meal consumed  - Identify factors contributing to decreased intake, treat as appropriate  - Assist with meals as needed  - Monitor I&O, weight, and lab values if indicated  - Obtain nutrition services referral as needed  Outcome: Progressing     Problem: METABOLIC, FLUID AND ELECTROLYTES - Reevaluate hemoglobin [id number] reported stable at 6.8 diet controlled weight stable diet stable   ADULT  Goal: Electrolytes maintained within normal limits  Description  INTERVENTIONS:  - Monitor labs and assess patient for signs and symptoms of electrolyte imbalances  - Administer electrolyte replacement as ordered  - Monitor response to electrolyte replacements, including repeat lab results as appropriate  - Instruct patient on fluid and nutrition as appropriate  Outcome: Progressing  Goal: Fluid balance maintained  Description  INTERVENTIONS:  - Monitor labs   - Monitor I/O and WT  - Instruct patient on fluid and nutrition as appropriate  - Assess for signs & symptoms of volume excess or deficit  Outcome: Progressing  Goal: Glucose maintained within target range  Description  INTERVENTIONS:  - Monitor Blood Glucose as ordered  - Assess for signs and symptoms of hyperglycemia and hypoglycemia  - Administer ordered medications to maintain glucose within target range  - Assess nutritional intake and initiate nutrition service referral as needed  Outcome: Progressing     Problem: SKIN/TISSUE INTEGRITY - ADULT  Goal: Skin integrity remains intact  Description  INTERVENTIONS  - Identify patients at risk for skin breakdown  - Assess and monitor skin integrity  - Assess and monitor nutrition and hydration status  - Monitor labs (i e  albumin)  - Assess for incontinence   - Turn and reposition patient  - Assist with mobility/ambulation  - Relieve pressure over bony prominences  - Avoid friction and shearing  - Provide appropriate hygiene as needed including keeping skin clean and dry  - Evaluate need for skin moisturizer/barrier cream  - Collaborate with interdisciplinary team (i e  Nutrition, Rehabilitation, etc )   - Patient/family teaching  Outcome: Progressing  Goal: Oral mucous membranes remain intact  Description  INTERVENTIONS  - Assess oral mucosa and hygiene practices  - Implement preventative oral hygiene regimen  - Implement oral medicated treatments as ordered  - Initiate Nutrition services referral as needed  Outcome: Progressing     Problem: MUSCULOSKELETAL - ADULT  Goal: Maintain or return mobility to safest level of function  Description  INTERVENTIONS:  - Assess patient's ability to carry out ADLs; assess patient's baseline for ADL function and identify physical deficits which impact ability to perform ADLs (bathing, care of mouth/teeth, toileting, grooming, dressing, etc )  - Assess/evaluate cause of self-care deficits   - Assess range of motion  - Assess patient's mobility  - Assess patient's need for assistive devices and provide as appropriate  - Encourage maximum independence but intervene and supervise when necessary  - Involve family in performance of ADLs  - Assess for home care needs following discharge   - Consider OT consult to assist with ADL evaluation and planning for discharge  - Provide patient education as appropriate  Outcome: Progressing  Goal: Maintain proper alignment of affected body part  Description  INTERVENTIONS:  - Support, maintain and protect limb and body alignment  - Provide patient/ family with appropriate education  Outcome: Progressing     Problem: COPING  Goal: Pt/Family able to verbalize concerns and demonstrate effective coping strategies  Description  INTERVENTIONS:  - Assist patient/family to identify coping skills, available support systems and cultural and spiritual values  - Provide emotional support, including active listening and acknowledgement of concerns of patient and caregivers  - Reduce environmental stimuli, as able  - Provide patient education  - Assess for spiritual pain/suffering and initiate spiritual care, including notification of Pastoral Care or bianka based community as needed  - Assess effectiveness of coping strategies  Outcome: Progressing  Goal: Will report anxiety at manageable levels  Description  INTERVENTIONS:  - Administer medication as ordered  - Teach and encourage coping skills  - Provide emotional support  - Assess patient/family for anxiety and ability to cope  Outcome: Progressing     Problem: Prexisting or High Potential for Compromised Skin Integrity  Goal: Skin integrity is maintained or improved  Description  INTERVENTIONS:  - Identify patients at risk for skin breakdown  - Assess and monitor skin integrity  - Assess and monitor nutrition and hydration status  - Monitor labs   - Assess for incontinence   - Turn and reposition patient  - Assist with mobility/ambulation  - Relieve pressure over bony prominences  - Avoid friction and shearing  - Provide appropriate hygiene as needed including keeping skin clean and dry  - Evaluate need for skin moisturizer/barrier cream  - Collaborate with interdisciplinary team   - Patient/family teaching  - Consider wound care consult   Outcome: Progressing     Problem: Potential for Falls  Goal: Patient will remain free of falls  Description  INTERVENTIONS:  - Assess patient frequently for physical needs  -  Identify cognitive and physical deficits and behaviors that affect risk of falls    -  [place] fall precautions as indicated by assessment   - Educate patient/family on patient safety including physical limitations  - Instruct patient to call for assistance with activity based on assessment  - Modify environment to reduce risk of injury  - Consider OT/PT consult to assist with strengthening/mobility  Outcome: Progressing     Problem: PAIN - ADULT  Goal: Verbalizes/displays adequate comfort level or baseline comfort level  Description  Interventions:  - Encourage patient to monitor pain and request assistance  - Assess pain using appropriate pain scale  - Administer analgesics based on type and severity of pain and evaluate response  - Implement non-pharmacological measures as appropriate and evaluate response  - Consider cultural and social influences on pain and pain management  - Notify physician/advanced practitioner if interventions unsuccessful or patient reports new pain  Outcome: Progressing

## 2024-02-21 PROBLEM — J69.0 ASPIRATION PNEUMONIA DUE TO REGURGITATED FOOD (HCC): Status: RESOLVED | Noted: 2020-09-29 | Resolved: 2024-02-21

## 2024-02-21 PROBLEM — N39.0 RECURRENT UTI: Status: RESOLVED | Noted: 2021-09-28 | Resolved: 2024-02-21

## 2024-03-01 NOTE — UTILIZATION REVIEW
Continued Stay Review    Date: 5/22/22    Day 2             Current Patient Class: inpatient  Current Level of Care: med surg    Assessment/Plan:   Patient SpO2 97% on room air  Patient stable for discharge home and awaiting family decision on rehab versus home with home PT/OT  Will discuss with family moving forward  Continue mild COVID care, stop decadron, monitor vs and IO, continue accuchecks w/ssi, continue home meds  Ortho consult, no surgical needs at this time      Vital Signs:   Date/Time Temp Pulse Resp BP SpO2 O2 Device Patient Position - Orthostatic VS   05/23/22 0854 -- 67 -- 151/62 -- -- --   05/23/22 08:20:02 97 2 °F (36 2 °C) Abnormal  -- 18 -- -- -- --   05/23/22 0036 98 6 °F (37 °C) 68 18 140/61 -- None (Room air) Lying   05/22/22 16:22:06 100 1 °F (37 8 °C) -- 16 132/63 -- -- --   05/22/22 0720 97 9 °F (36 6 °C) 72 18 118/68 -- -- --   05/21/22 2155 98 7 °F (37 1 °C) 64 20 126/62 99 % None (Room air) Sitting   05/21/22 1530 98 3 °F (36 8 °C) 72 20 130/64 97 % None (Room air) --   05/21/22 0719 98 2 °F (36 8 °C) 62 20 112/62 95 % None (Room air) --       Pertinent Labs/Diagnostic Results:       Results from last 7 days   Lab Units 05/21/22  0829 05/20/22  0603 05/19/22  1959 05/19/22  0526   WBC Thousand/uL 6 80 7 94 8 03  --    HEMOGLOBIN g/dL 9 9* 9 9* 10 0*  --    I STAT HEMOGLOBIN g/dl  --   --   --  11 9   HEMATOCRIT % 31 6* 31 4* 32 1*  --    HEMATOCRIT, ISTAT %  --   --   --  35   PLATELETS Thousands/uL 245 218 236  --    NEUTROS ABS Thousands/µL 4 21 6 43 6 90  --      Results from last 7 days   Lab Units 05/21/22  1759 05/21/22  0630 05/20/22  0603 05/19/22 1953 05/19/22 0526   SODIUM mmol/L 135* 136 136 137  --    POTASSIUM mmol/L 4 3 4 5 4 1 4 1  --    CHLORIDE mmol/L 99* 101 103 105  --    CO2 mmol/L 28 27 26 24  --    CO2, I-STAT mmol/L  --   --   --   --  30   ANION GAP mmol/L 8 8 7 8  --    BUN mg/dL 70* 68* 55* 48*  --    CREATININE mg/dL 2 08* 2 17* 1 92* 1 77*  --    EGFR Submitted PA for [employer] via covermymeds and received approval. Case ID#[id number]. Called and updated pharmacy. Pharmacy processed OCP and stated they will have it ready for  in 30 minutes. Pt notified.    ml/min/1 73sq m 20 19 22 25  --    CALCIUM mg/dL 9 4 9 1 8 9 8 8  --    CALCIUM, IONIZED, ISTAT mmol/L  --   --   --   --  1 14   MAGNESIUM mg/dL  --   --  2 6  --   --    PHOSPHORUS mg/dL  --   --  2 9  --   --      Results from last 7 days   Lab Units 05/20/22  0603 05/19/22 1953   AST U/L 26 26   ALT U/L 17 17   ALK PHOS U/L 39* 39*   TOTAL PROTEIN g/dL 6 8 6 8   ALBUMIN g/dL 2 7* 2 6*   TOTAL BILIRUBIN mg/dL 0 45 0 48     Results from last 7 days   Lab Units 05/23/22  0814 05/22/22  1624 05/22/22  1048 05/22/22  0714 05/21/22  2112 05/21/22  1555 05/21/22  1252 05/21/22  0735 05/20/22  1723 05/19/22  1835   POC GLUCOSE mg/dl 111 108 142* 91 96 153* 130 82 133 196*     Results from last 7 days   Lab Units 05/21/22  1759 05/21/22  0630 05/20/22  0603 05/19/22 1953   GLUCOSE RANDOM mg/dL 91 84 146* 139     Results from last 7 days   Lab Units 05/19/22  0526   PH, KHALIF I-STAT  7 437*   PCO2, KHALIF ISTAT mm HG 42 7   PO2, KHALIF ISTAT mm HG 25 0*   HCO3, KHALIF ISTAT mmol/L 28 8   I STAT BASE EXC mmol/L 4*   I STAT O2 SAT % 47*     Results from last 7 days   Lab Units 05/19/22  2305 05/19/22 1953   HS TNI 0HR ng/L  --  181*   HS TNI 2HR ng/L 185*  --    HSTNI D2 ng/L 4  --    HS TNI 4HR ng/L 183*  --    HSTNI D4 ng/L 2  --      Results from last 7 days   Lab Units 05/19/22 1953   D-DIMER QUANTITATIVE ug/ml FEU 3 44*     Results from last 7 days   Lab Units 05/21/22  0630   TSH 3RD GENERATON uIU/mL 3 350     Results from last 7 days   Lab Units 05/19/22 1953   PROCALCITONIN ng/ml 0 20     Results from last 7 days   Lab Units 05/19/22 1953   NT-PRO BNP pg/mL 23,264*     Results from last 7 days   Lab Units 05/20/22  0603 05/19/22 1953   CRP mg/L 121 0* 87 3*     Results from last 7 days   Lab Units 05/20/22  0605 05/19/22  0911   CLARITY UA  Clear Turbid   COLOR UA  Colorless Light Yellow   SPEC GRAV UA  1 010 1 016   PH UA  5 0 5 5   GLUCOSE UA mg/dl Negative Negative   KETONES UA mg/dl Negative Negative   BLOOD UA Negative Moderate*   PROTEIN UA mg/dl Negative 30 (1+)*   NITRITE UA  Negative Negative   BILIRUBIN UA  Negative Negative   UROBILINOGEN UA (BE) mg/dl <2 0 <2 0   LEUKOCYTES UA  Negative Large*   WBC UA /hpf  --  Innumerable*   RBC UA /hpf  --  1-2   BACTERIA UA /hpf  --  None Seen   EPITHELIAL CELLS WET PREP /hpf  --  Occasional     Medications:   Scheduled Medications:  allopurinol, 150 mg, Oral, Daily  clopidogrel, 75 mg, Oral, Daily  docusate sodium, 100 mg, Oral, BID  enoxaparin, 30 mg, Subcutaneous, Daily  fenofibrate, 145 mg, Oral, Daily  gabapentin, 300 mg, Oral, TID  insulin lispro, 4-20 Units, Subcutaneous, TID AC  isosorbide mononitrate, 30 mg, Oral, Daily  levothyroxine, 112 mcg, Oral, Early Morning  lidocaine, 1 patch, Topical, Daily  metoprolol tartrate, 25 mg, Oral, Q12H KARINE  multivitamin with iron-minerals, 15 mL, Oral, Daily  senna, 1 tablet, Oral, Daily  sertraline, 25 mg, Oral, Daily  torsemide, 40 mg, Oral, Daily      Continuous IV Infusions: none     PRN Meds:  acetaminophen, 650 mg, Oral, Q6H PRN  albuterol, 2 puff, Inhalation, Q4H PRN  calcium carbonate, 1,000 mg, Oral, Daily PRN  HYDROcodone-acetaminophen, 1 tablet, Oral, Q6H PRN  methocarbamol, 750 mg, Oral, Q6H PRN  nitroglycerin, 0 4 mg, Sublingual, Q5 Min PRN  ondansetron, 4 mg, Intravenous, Q6H PRN        Discharge Plan: [facility]    Network Utilization Review Department  ATTENTION: Please call with any questions or concerns to [phone] and carefully listen to the prompts so that you are directed to the right person  All voicemails are confidential   Millie Bailon all requests for admission clinical reviews, approved or denied determinations and any other requests to dedicated fax number below belonging to the campus where the patient is receiving treatment   List of dedicated fax numbers for the Facilities:  [address] [place] [address] DENIALS (Administrative/Medical Necessity) [phone]   [address] [place] [address] (Maternity/NICU/Pediatrics) [address] [facility],[address] [facility] 40 125 [facility]  [phone]   Vincent Guzman 50 [address] [facility] [address] [id number] [place] [address] [address] [facility] [address] [facility] [phone]

## 2024-04-12 NOTE — PROGRESS NOTES
Virtual Regular Visit    Patient is located at Home in the following state in which I hold an active license [place]    Assessment/Plan:    Reason for visit is No chief complaint on file.       Encounter provider Ladarius Llamas DO    Provider located at HEART & VASCULAR [facility] CARDIOLOGY [facility]  [address] [place] [address] [place] [id number]      The patient was identified by name and date of birth. Zakia Lr was informed that this is a telemedicine visit and that the visit is being conducted through the telephone platform. She agrees to proceed..  My office door was closed. No one else was in the room.  The patient has agreed to participate and understands they can discontinue the visit at any time.    Patient is aware this is a billable service.     Subjective  # Chronic HFrEF, stage C, NYHA III  Etiology: given persistent wall motion abnormalities on echo must rule out ischemia (previously hesitant given CKD), vs HTN vs stress myopathy though typical in motion atypical in that echo in August was showing similar wall motion abnormalities. Offered LHC, family and pt preferred more comfort approach, less invasive     Weight: 157 lbs down from 184 lbs  When admitted in August recorded at 220 lbs  NT proBNP 2/23/24: 4820  9/29/20: 17,640  3/4/20: 16,419  11/4/19: 27,700     Studies- personally reviewed by me  Echo 11/20/23: acute CVA  LVEF: 35%, akinesis of apical segments  RV: normal  AV: bioprosthetic valve  MV: annular right, mild MR  Mild TR     Echo 3/13/20: EF around 30%, dyskinesis of mid apical anterior and mid anterolateral walls  PASP: 50 mmHg  MR: Severe      Echocardiogram 10/22/19  LVEF: 40%, akinesis of distal half of anterior, lateral and inferior walls with akinesis of distal 3rd of septum, akinetic apex- looked like   Takotsubo  Grade 2 DD  LVIDd: 4.7 cm  RV: normal  MR: moderate to severe  PASP:  RVOT:   Other: moderate TR     Echo 8/17/19  LVEF: 45%, same wall motion abnormalities  as above- but not nearly as severe     Neurohormonal Blockade:  --Beta-Blocker: metoprolol tartrate 25 mg BID  --ACEi, ARB or ARNi: CKD4  Imdur 30 mg daily  --Aldosterone Receptor Blocker:  --Diuretic: bumex 2 mg BID     Sudden Cardiac Death Risk Reduction:  --ICD:      Electrical Resynchronization:  --Candidacy for BiV device:     Advanced Therapies (if appropriate):  --Inotrope:  --LVAD/Transplant Candidacy:     # right sided pleural effusion 2/26 CXR  Right thoracentesis 2/28: 1 liter  # CKD3, Cr 1.56 on 10/1/20, went up to 2.2, but now 1.72 on 4/2/21  GFR 26, cr up to 2.13 on 12/9/21, 2.2 on 7/15/22, 2.1 on 10/9/22, 1.9 on 11/28/23, Cr 1.6 on 1/5  # subdural hematoma  # AZUL- non compliant with treatment  # HTN, hx of DONAVAN stenosis and stenting  - Plavix  # mixed hyperlipidemia- fenofibrate 48 mg, red yeast rice  Direct   10/8/21: Tri 324, HDL 47,   4/2/21: Tri 588, HDL 45  # DM2  # anemia, 10.3 on 1/5/24  # Hx of CVA x 2- mild right leg weakness  # New acute CVA Nov 2023- acute lacunar infarct in right centrum semioval  DAPT for 21 days done  # hx of bioprosthetic AVR  # gait dysfunction- not very mobile  Uses walker     Today's Plan:  EF stable at 35%, likely ischemic, treating medically  CKD stable  Give diuretic earlier in afternoon- but wearing adult diaper  Getting some wheezing, likely congestion- would dose the recommended bumex 2 mg in evening instead of 1 mg  --2g sodium diet   Weights daily     HPI:      88 yo female who is being evaluated for HFrEF. She has a history of bioprosthetic AVR, previous HFpEF, HTN, AZUL not using CPAP but uses oxygen at night, hyperlipidemia, hx of CVA, DM2. Use to see Dr Thompson at [facility]. She was in [facility] following subdural hematoma from a fall. She became acutely short of breath requiring BiPap for stabilization. ABG did shows CO2 retention.      Previously admitted in August to [facility] for diastolic heart failure. She diuresed around 35 lbs. This was  her first admit for heart failure. Discharged at 192 lbs, admitted at 220 lbs.    Last visit 2/25/20: Urgent visit today for decreased oxygen saturations at home but states weights are not home. Daughter also said difficult to arouse when sleeping  She has hx of CO2 retention 10/21/19 ABG with CO2 to 81 requiring BiPap, required steroids, and nebs  It was in this setting she had Takotsubo pattern CM. She did not want to pursue LHC and could not pursue sleep study. Also could not tolerate nuclear stress test.       In hospital in Sept with aspiration PNA due to regurgitated food     Interim:  Pt in hospital in November with CVA- MRI showed acute lacunar infarct int he right centrum semioval- LUE weakness  DAPT for 21 days  DC to [facility]    Vitals:    04/15/24 0813   BP: 151/66   Pulse: 63         Past Medical History:   Diagnosis Date    Arthritis     Breast cancer (HCC) 09/08/2015    BILATERAL    Cardiac disease     aortic valve transplant    CHF (congestive heart failure) (HCC)     Chronic kidney disease     Compression fracture of body of thoracic vertebra (HCC)     CVA (cerebral vascular accident) (HCC)     Diabetes mellitus (HCC)     Disease of thyroid gland     Fibromyalgia, primary     Gout     H/O cervical fracture 01/09/2019    Hyperlipidemia     Hypertension     Neuropathy     AZUL (obstructive sleep apnea) 10/19/2019    Pressure injury of skin     Renal disorder     kidney stent    Stroke (HCC)     UTI (urinary tract infection)     colinized bacteria.       Past Surgical History:   Procedure Laterality Date    AORTIC VALVE SURGERY      BREAST LUMPECTOMY Right 09/08/2015    BREAST LUMPECTOMY Left 09/08/2015    BREAST SURGERY      lumpectomy for breast cancer    BREAST SURGERY      CARDIAC VALVE REPLACEMENT      CATARACT EXTRACTION      CERVICAL SPINE SURGERY      CHOLECYSTECTOMY      FACIAL/NECK BIOPSY Right 9/24/2020    Procedure: EXCISION CHEEK LESION X2 WITH FROZEN SECTION;  Surgeon: Key Perez DO;   Location:  MAIN OR;  Service: Plastics    HYSTERECTOMY  1978    IR THORACENTESIS  2/28/2020    JOINT REPLACEMENT      KIDNEY SURGERY      stent placed for kidney    KNEE SURGERY      OTHER SURGICAL HISTORY      abdominal aneurysm surgery    MD ARTHRODESIS POSTERIOR ATLAS-AXIS C1-C2 N/A 5/1/2019    Procedure: C1-C2 lateral mass fixation fusion with possible sublaminar cables;  Surgeon: Bossman Keenan MD;  Location:  MAIN OR;  Service: Neurosurgery    REPLACEMENT TOTAL KNEE      left        Current Outpatient Medications   Medication Sig Dispense Refill    albuterol (PROVENTIL HFA,VENTOLIN HFA) 90 mcg/act inhaler Inhale 2 puffs every 4 (four) hours as needed for wheezing      allopurinol (ZYLOPRIM) 300 mg tablet Take 0.5 tablets (150 mg total) by mouth daily  0    aspirin 81 mg chewable tablet Chew 1 tablet (81 mg total) daily Do not start before November 29, 2023.  0    bumetanide (BUMEX) 2 mg tablet Take 1 tablet (2 mg total) by mouth 2 (two) times a day 180 tablet 3    calcium carbonate (TUMS) 500 mg chewable tablet Chew 2 tablets (1,000 mg total) daily as needed for indigestion or heartburn  0    clopidogrel (PLAVIX) 75 mg tablet Take 1 tablet (75 mg total) by mouth daily for 10 days  0    colchicine (COLCRYS) 0.6 mg tablet Take 0.6 mg by mouth as needed Every 3 days      cyanocobalamin (VITAMIN B-12) 1000 MCG tablet Take 1 tablet (1,000 mcg total) by mouth daily Do not start before November 29, 2023.  0    gabapentin (NEURONTIN) 300 mg capsule Take 1 capsule (300 mg total) by mouth 2 (two) times a day  0    HYDROcodone-acetaminophen (NORCO) 5-325 mg per tablet Take 1 tablet by mouth every 6 (six) hours as needed for pain Max Daily Amount: 4 tablets 10 tablet 0    insulin lispro (HumaLOG) 100 units/mL injection Inject 1-5 Units under the skin 3 (three) times a day before meals  0    insulin lispro (HumaLOG) 100 units/mL injection Inject 4 Units under the skin 3 (three) times a day with meals  0    isosorbide  mononitrate (IMDUR) 30 mg 24 hr tablet Take 1 tablet (30 mg total) by mouth daily  0    levothyroxine 100 mcg tablet Take 1 tablet (100 mcg total) by mouth daily 30 tablet 0    lidocaine (Lidoderm) 5 % Apply 1 patch topically over 12 hours daily Remove & Discard patch within 12 hours or as directed by MD 2 patch 0    methocarbamol (ROBAXIN) 750 mg tablet Take 1 tablet (750 mg total) by mouth every 6 (six) hours as needed for muscle spasms  0    metoprolol tartrate (LOPRESSOR) 25 mg tablet Take 25 mg by mouth every 12 (twelve) hours      nystatin (MYCOSTATIN) powder Apply topically 2 (two) times a day 15 g 0    polyethylene glycol (MIRALAX) 17 g packet Take 17 g by mouth daily Do not start before February 15, 2023.  0    pravastatin (PRAVACHOL) 10 mg tablet Take 1 tablet (10 mg total) by mouth daily with dinner  0    predniSONE 5 mg tablet 5 mg as needed      senna (SENOKOT) 8.6 mg Take 1 tablet (8.6 mg total) by mouth daily  0    sertraline (ZOLOFT) 25 mg tablet Take 1 tablet (25 mg total) by mouth daily  0    torsemide 40 MG TABS Take 40 mg by mouth daily AND 20 mg in the evening. Take before meals.  0     No current facility-administered medications for this visit.        Allergies   Allergen Reactions    Duloxetine Hcl Other (See Comments) and Hypertension    Duloxetine Hcl      Cymbalta; Hemorrhagic stroke listed as reaction    Duloxetine Hcl Hemorrhagic stroke and Hypertension    Escitalopram      Other reaction(s): Urinary Retention    Pregabalin      Other reaction(s): Hypertension    Statins Myalgia    Tramadol      Other reaction(s): Hypertension    Triprolidine-Pse Other (See Comments)    Anastrozole Abdominal Pain    Anastrozole     Anastrozole Other (See Comments)     Other     Antihistamines, Diphenhydramine-Type     Exemestane      Aromasin; Muscle pain & cramps    Exemestane Other (See Comments)     Other     Lexapro [Escitalopram]      Urinary retention    Lexapro [Escitalopram] Other (See Comments)      Urinary retention    Lyrica [Pregabalin]      hypertension    Lyrica [Pregabalin] Hypertension    Metformin      diarrhea    Metformin Other (See Comments)     Other       Oxycodone      Pt unsure      Oxycodone Other (See Comments)     Other       Pseudoephedrine Other (See Comments)     Other       Statins      Muscle pain & cramps    Statins Myalgia    Tramadol      hypertension    Tramadol Hypertension    Triprolidine Other (See Comments)     Other       Triprolidine-Pseudoephedrine     Metformin Diarrhea    Metolazone Other (See Comments)     Dizzyness, hypotension       Review of Systems   Constitutional:  Negative for activity change, appetite change, fatigue and unexpected weight change.   HENT:  Negative for congestion and nosebleeds.    Eyes: Negative.    Respiratory:  Negative for cough, chest tightness and shortness of breath.    Cardiovascular:  Negative for chest pain, palpitations and leg swelling.   Gastrointestinal:  Negative for abdominal distention.   Endocrine: Negative.    Genitourinary: Negative.    Musculoskeletal: Negative.    Skin: Negative.    Neurological:  Negative for dizziness, syncope and weakness.   Hematological: Negative.    Psychiatric/Behavioral: Negative.         Video Exam  Vitals:    04/15/24 0813   BP: 151/66   Pulse: 63   BP was before meds    Physical Exam  Constitutional:       General: She is not in acute distress.     Appearance: She is well-developed.   HENT:      Head: Normocephalic.   Pulmonary:      Effort: Pulmonary effort is normal. No respiratory distress.   Neurological:      Mental Status: She is alert and oriented to person, place, and time.          Visit Time  Total Visit Duration: 25

## 2024-04-15 ENCOUNTER — TELEMEDICINE (OUTPATIENT)
Dept: CARDIOLOGY CLINIC | Facility: CLINIC | Age: 89
End: 2024-04-15
Payer: COMMERCIAL

## 2024-04-15 VITALS — DIASTOLIC BLOOD PRESSURE: 66 MMHG | SYSTOLIC BLOOD PRESSURE: 151 MMHG | HEART RATE: 63 BPM

## 2024-04-15 DIAGNOSIS — I10 PRIMARY HYPERTENSION: ICD-10-CM

## 2024-04-15 DIAGNOSIS — I50.42 CHRONIC COMBINED SYSTOLIC AND DIASTOLIC HEART FAILURE (HCC): ICD-10-CM

## 2024-04-15 DIAGNOSIS — I70.1 RENAL ARTERY STENOSIS (HCC): Primary | ICD-10-CM

## 2024-04-15 DIAGNOSIS — I10 ESSENTIAL HYPERTENSION: ICD-10-CM

## 2024-04-15 PROCEDURE — 99443 PR PHYS/QHP TELEPHONE EVALUATION 21-30 MIN: CPT | Performed by: INTERNAL MEDICINE

## 2024-04-15 RX ORDER — ISOSORBIDE MONONITRATE 30 MG/1
30 TABLET, EXTENDED RELEASE ORAL DAILY
Qty: 90 TABLET | Refills: 3 | Status: SHIPPED | OUTPATIENT
Start: 2024-04-15

## 2024-04-23 ENCOUNTER — TELEPHONE (OUTPATIENT)
Age: 89
End: 2024-04-23

## 2024-04-23 NOTE — TELEPHONE ENCOUNTER
Patient's daughter calls in asking about prescription for Imdur, was not sure if it was sent.      Advised that it was sent in to [facility] on 4/15, she will call them and check.  If any issues she will call office for assistance.

## 2024-04-29 DIAGNOSIS — I10 ESSENTIAL HYPERTENSION: ICD-10-CM

## 2024-04-29 DIAGNOSIS — I50.42 CHRONIC COMBINED SYSTOLIC AND DIASTOLIC HEART FAILURE (HCC): ICD-10-CM

## 2024-04-29 RX ORDER — ISOSORBIDE MONONITRATE 30 MG/1
30 TABLET, EXTENDED RELEASE ORAL DAILY
Qty: 15 TABLET | Refills: 0 | Status: CANCELLED | OUTPATIENT
Start: 2024-04-29

## 2024-04-29 NOTE — TELEPHONE ENCOUNTER
Medication: Isosorbide mononitrate    Dose/Frequency: 30 mg / 1 tablet daily     Quantity: 15    Ph[facility] [address]     Office:   [] PCP/Provider -   [x] Speciality/Provider -     Does the patient have enough for 3 days?   [] Yes   [x] No - Send as HP to POD          Patient said never received the 90 days supply sent to the mail order on 4/15 and is now out of the medication. Need short term supply to local pharmacy today.

## 2024-04-29 NOTE — TELEPHONE ENCOUNTER
Medication: isosorbide mononitrate  i  Dose/Frequency: 30 mg / 1 tablet daily    Quantity: 90    Pharmacy: [facility]    Office:   [] PCP/Provider -   [x] Speciality/Provider -     Does the patient have enough for 3 days?   [] Yes   [x] No - Send as HP to POD

## 2024-04-30 DIAGNOSIS — I10 ESSENTIAL HYPERTENSION: ICD-10-CM

## 2024-04-30 DIAGNOSIS — I50.42 CHRONIC COMBINED SYSTOLIC AND DIASTOLIC HEART FAILURE (HCC): ICD-10-CM

## 2024-04-30 RX ORDER — ISOSORBIDE MONONITRATE 30 MG/1
30 TABLET, EXTENDED RELEASE ORAL DAILY
Qty: 90 TABLET | Refills: 3 | Status: SHIPPED | OUTPATIENT
Start: 2024-04-30 | End: 2024-05-02 | Stop reason: SDUPTHER

## 2024-04-30 NOTE — TELEPHONE ENCOUNTER
Medication: Imdur    Dose/Frequency: 30mg daily    Quantity: 90    Pharmacy: [facility] [address]    Office:   [] PCP/Provider -   [x] Speciality/Provider - Dr Ladarius Llamas    Does the patient have enough for 3 days?   [] Yes   [x] No - Send as HP to POD               Patient is completely out of this medication, [facility] never received the renewal that was sent Apr 15th.

## 2024-04-30 NOTE — TELEPHONE ENCOUNTER
Medication: Imdur    Dose/Frequency: 30mg daily    Quantity: 90    Pharmacy: [facility]    Office:   [] PCP/Provider -   [x] Speciality/Provider - Dr Ladarius Llamas    Does the patient have enough for 3 days?   [] Yes   [x] No - Send as HP to POD

## 2024-05-02 DIAGNOSIS — I50.42 CHRONIC COMBINED SYSTOLIC AND DIASTOLIC HEART FAILURE (HCC): ICD-10-CM

## 2024-05-02 DIAGNOSIS — I10 ESSENTIAL HYPERTENSION: ICD-10-CM

## 2024-05-02 NOTE — TELEPHONE ENCOUNTER
Spoke with patients daughter Teri regarding Imdur prescription, has not been received by [facility], was sent April 30th. Refill request sent.

## 2024-05-02 NOTE — TELEPHONE ENCOUNTER
Medication: Imdur    Dose/Frequency: 30mg daily    Quantity: 30    Pharmacy: [facility]    Office:   [] PCP/Provider -   [x] Speciality/Provider -     Does the patient have enough for 3 days?   [] Yes   [x] No - Send as HP to POD

## 2024-05-03 RX ORDER — ISOSORBIDE MONONITRATE 30 MG/1
30 TABLET, EXTENDED RELEASE ORAL DAILY
Qty: 90 TABLET | Refills: 3 | Status: SHIPPED | OUTPATIENT
Start: 2024-05-03

## 2024-05-07 NOTE — ASSESSMENT & PLAN NOTE
"Subjective   Reason for Visit: Bib Dai is an 70 y.o. male here for a Medicare Wellness visit and chronic medical problems.    Past Medical, Surgical, and Family History reviewed and updated in chart.    Reviewed all medications by prescribing practitioner or clinical pharmacist (such as prescriptions, OTCs, herbal therapies and supplements) and documented in the medical record.    4 months ago, sensation after urinating.  Not quite burning, no pain.  No blood.  Symptoms intermittent, daily.  Otc is aleve, for pain as needed.  No otc antihistamines.  4-5 glasses of water daily.  Urine usually light color.  No progression of symptoms.  Urinary stream weaker but no change.  Emptied bladder.  Nocturia 2-4 times.  Recent PSA 3.00.  Meds and labs reviewed.      Patient Care Team:  Miguel Elizondo MD as PCP - General  Miguel Elizondo MD as PCP - [employer] PCP     Review of Systems   Constitutional:  Negative for fatigue.   Respiratory:  Negative for shortness of breath.    Cardiovascular:  Negative for chest pain.   Genitourinary:  Positive for frequency. Negative for decreased urine volume, difficulty urinating, dysuria, flank pain, hematuria and urgency.        Nocturia.       Objective   Vitals:  /78 (BP Location: Right arm, Patient Position: Sitting)   Pulse 69   Ht 1.702 m (5' 7\")   Wt 88 kg (194 lb)   SpO2 100%   BMI 30.38 kg/m²       Physical Exam  Constitutional:       Appearance: Normal appearance.   Cardiovascular:      Rate and Rhythm: Normal rate and regular rhythm.      Pulses: Normal pulses.      Heart sounds: Normal heart sounds.   Pulmonary:      Effort: Pulmonary effort is normal.      Breath sounds: Normal breath sounds.   Abdominal:      General: Abdomen is flat. Bowel sounds are normal.      Palpations: Abdomen is soft.   Neurological:      General: No focal deficit present.      Mental Status: He is alert.   Psychiatric:         Mood and Affect: Mood normal.         " Her hemoglobin did drop from 8 1-->7 6  Patient was experiencing some mild shortness of breath and transfused for symptomatic anemia  Patient is status post transfusion 10/30 she received 1 unit of PRBCs     Continue to transfuse with hemoglobin < 7 0 Behavior: Behavior normal.         Thought Content: Thought content normal.         Judgment: Judgment normal.         Assessment/Plan   Problem List Items Addressed This Visit       Ascending aorta enlargement (CMS-HCC)    Current Assessment & Plan     Evan at 4.2 cm, echo 9-2023.         Routine general medical examination at health care facility - Primary    Relevant Orders    1 Year Follow Up In Primary Care - Wellness Exam    Benign prostatic hyperplasia with nocturia    Current Assessment & Plan     Check UA and culture.  If negative then rec tamsulosin 0.4 mg daily.  Consider urology consult if no improvement.         Relevant Orders    Urinalysis with Reflex Microscopic    Urine Culture    Urinary symptom or sign    Relevant Orders    Urinalysis with Reflex Microscopic    Urine Culture

## 2024-05-15 DIAGNOSIS — I50.42 CHRONIC COMBINED SYSTOLIC AND DIASTOLIC HEART FAILURE (HCC): Primary | ICD-10-CM

## 2024-05-15 NOTE — TELEPHONE ENCOUNTER
Medication: metoprolol     Dose/Frequency: 25 mg q 12hrs     Quantity: 90    [facility]    Office:   [] PCP/Provider -   [x] Speciality/Provider -     Does the patient have enough for 3 days?   [x] Yes   [] No - Send as HP to POD

## 2024-05-15 NOTE — TELEPHONE ENCOUNTER
Refill must be reviewed and completed by the office or provider. The refill is unable to be approved or denied by the medication management team.    Last ordered by another provider (hospital encounter) Please review.   2/9/2023 - 2/14/2023 (5 days) [facility]

## 2024-05-22 ENCOUNTER — TELEPHONE (OUTPATIENT)
Age: 89
End: 2024-05-22

## 2024-05-22 NOTE — TELEPHONE ENCOUNTER
Pt's daughter Teri called wanting to know if script was sent to pharmacy for Metoprolol. She states that pt is almost out so it is out [facility] [address] but for further would [facility]

## 2024-11-07 NOTE — ASSESSMENT & PLAN NOTE
Patient in no active respiratory distress   Patient currently asymptomatic   Will continue to monitor CBC  Will continue to monitor patient for symptoms   Patient received remdesivir and dexamethasone in the hospital, discontinued due to the fact the patient did not have related symptoms  Will continue PT OT  Encourage out of bed as tolerated  Continue isolation  Provide supportive care [FreeTextEntry2] : NP - Rt. knee

## 2024-12-16 NOTE — TELEPHONE ENCOUNTER
----- Message from Filiberto Alfaro MD sent at 10/31/2022  7:34 AM EDT -----  Let her know I saw her labs from the other day and creatinine stable at 2 so kidney function same as her baseline 
Called and left voicemail  Patient creatinine stable at 2 so kidney function same as her baseline 
GOAL: Pt will increase dynamic standing balance by 1/2 grade to facilitate ability to perform ADLs and functional mobility within 4 weeks

## 2025-03-01 NOTE — PROGRESS NOTES
Progress Note - Jean Jacinto 1934, 80 y o  female MRN: [id number]    Unit/Bed#: 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  Will resume home lasix today as she has some wheezing and a few crackles noted on exam   - increase Norvasc to 10 mg       Closed nondisplaced fracture of second cervical vertebra (HCC)   Assessment & Plan    - cervical collar per neurosurgery recs  - upright Xrays today  - analgesia: pt reporting she takes hydrocodone at home   -agreeable to current regimen here  - neuro intact    - PT/OT recommending rehab, CM assisting with placement      * Type III fracture of odontoid process [facility])   Assessment & Plan    -see above       DVT prophylaxis: SCDs and SQH  PT and OT: rehab    Disposition:  Awaiting Case Management to find placement  Subjective/Objective   Chief Complaint: "No new complaints "    Subjective:  Patient reports a slight headache this morning  Reports that happened when the readjusting the collar  Reports no new visual or auditory deficits  Denies any new numbness or tingling  Denies any new nausea or vomiting  Reports that her vision appears to be at baseline      Objective:     Meds/Allergies   Prescriptions Prior to Admission   Medication Sig Dispense Refill Last Dose    albuterol (PROVENTIL HFA,VENTOLIN HFA) 90 mcg/act inhaler Inhale 2 puffs every 4 (four) hours as needed for wheezing       amLODIPine (NORVASC) 5 mg tablet Take 5 mg by mouth daily       calcium carbonate-vitamin D (OSCAL-D) 500 mg-200 units per tablet Take 1 tablet by mouth 2 (two) times a day with meals  calcium-vitamin D 250-100 MG-UNIT per tablet Take 1 tablet by mouth 2 (two) times a day       clopidogrel (PLAVIX) 75 mg tablet Take 75 mg by mouth daily       furosemide (LASIX) 20 mg tablet Take 20 mg by mouth daily       gabapentin (NEURONTIN) 300 mg capsule Take 300 mg by mouth 2 (two) times a day 1 CAPSULE IN AM (300MG) AND 2 CAPS AT HS (600MG)        HYDROcodone-acetaminophen (NORCO) 5-325 mg per tablet Take 1 tablet by mouth every 6 (six) hours as needed for pain       hydroxychloroquine (PLAQUENIL) 200 mg tablet Take 200 mg by mouth daily with breakfast       Levothyroxine Sodium 112 MCG CAPS Take by mouth daily       lidocaine (LIDODERM) 5 % Apply 2 patches topically daily Remove & Discard patch within 12 hours or as directed by MD         LORazepam (ATIVAN) 0 5 mg tablet Take 0 5 mg by mouth 2 (two) times a day as needed for anxiety         losartan (COZAAR) 100 MG tablet Take 100 mg by mouth daily       LOSARTAN POTASSIUM-HCTZ PO Take by mouth       metoprolol tartrate (LOPRESSOR) 25 mg tablet Take 25 mg by mouth every 12 (twelve) hours       Multiple Vitamins-Calcium (ONE-A-DAY WOMENS PO) Take by mouth       nitroglycerin (NITROSTAT) 0 4 mg SL tablet Place 0 4 mg under the tongue every 5 (five) minutes as needed for chest pain       Omega-3 Fatty Acids (FISH OIL) 1,000 mg Take 1,000 mg by mouth daily       potassium chloride (K-DUR,KLOR-CON) 10 mEq tablet Take 10 mEq by mouth 2 (two) times a day       Red Yeast Rice 600 MG CAPS Take by mouth daily       sertraline (ZOLOFT) 25 mg tablet Take 25 mg by mouth daily          Vitals: Blood pressure 114/63, pulse (!) 50, temperature 98 42 °F (36 9 °C), resp  rate 14, height 4' 11 75" (1 518 m), weight 93 8 kg (206 lb 12 7 oz), SpO2 94 %  Body mass index is 40 72 kg/m²   SpO2: SpO2: 94 %, SpO2 Device: O2 Device: None (Room air)    ABG: No results found for: PHART, NGN6MDI, PO2ART, ANE1FMI, K2WLQDCO, BEART, SOURCE      Intake/Output Summary (Last 24 hours) at 01/13/19 1436  Last data filed at 01/13/19 0900   Gross per 24 hour   Intake              600 ml   Output             1700 ml   Net            -1100 ml       Invasive Devices     Peripheral Intravenous Line            Peripheral IV 01/09/19 Left Antecubital 3 days          Drain            External Urinary Catheter -- days                Nutrition/GI Proph/Bowel Reg:  Continue current diet    Physical Exam:   GENERAL APPEARANCE:  No acute distress, well-appearing  HEENT:  Normocephalic, PERRLA, EOMs intact  CV:  Regular rate and rhythm  LUNGS:  CTA bilaterally, no rales or rhonchi  ABD:  Bowel sounds x4, nontender, nondistended  EXT:  +2 pulses on extremities, neurovascularly intact, sensation intact  NEURO:  GCS 15, alert oriented x3, cranial nerves 2-12 intact, no focal deficits  SKIN:  Warm, dry, intact    Lab Results: Results: I have personally reviewed pertinent reports   , BMP/CMP: No results found for: SODIUM, K, CL, CO2, ANIONGAP, BUN, CREATININE, GLUCOSE, CALCIUM, AST, ALT, ALKPHOS, PROT, BILITOT, EGFR and CBC: No results found for: WBC, HGB, HCT, MCV, PLT, ADJUSTEDWBC, MCH, MCHC, RDW, MPV, NRBC  Imaging/EKG Studies: Results: I have personally reviewed pertinent reports  Other Studies:  No other studies  VTE Prophylaxis:  SCDs and subcutaneous heparin    Nurse rounds completed, plan of care discussed   used

## (undated) DEVICE — DRILL BIT G3606010 2.4MM

## (undated) DEVICE — PREP SURGICAL PURPREP 26ML

## (undated) DEVICE — NEEDLE BLUNT 18 G X 1 1/2IN

## (undated) DEVICE — SPECIMEN CONTAINER STERILE PEEL PACK

## (undated) DEVICE — PENCIL ELECTROSURG E-Z CLEAN -0035H

## (undated) DEVICE — NEURO PATTIES 1/2 X 3

## (undated) DEVICE — SUT ETHILON 6-0 P-3 18 IN 1698G

## (undated) DEVICE — STERILE POLYISOPRENE POWDER-FREE SURGICAL GLOVES: Brand: PROTEXIS

## (undated) DEVICE — 1820 FOAM BLOCK NEEDLE COUNTER: Brand: DEVON

## (undated) DEVICE — BETHLEHEM UNIVERSAL SPINE, KIT: Brand: CARDINAL HEALTH

## (undated) DEVICE — GLOVE SURG DERMASSURE LF 6.5

## (undated) DEVICE — TRAY FOLEY 16FR URIMETER SURESTEP

## (undated) DEVICE — STERILE POLYISOPRENE POWDER-FREE SURGICAL GLOVES WITH EMOLLIENT COATING: Brand: PROTEXIS

## (undated) DEVICE — LIGHT HANDLE COVER SLEEVE DISP BLUE STELLAR

## (undated) DEVICE — DRAPE SURGIKIT SADDLE BAG

## (undated) DEVICE — MAYFIELD® DISPOSABLE ADULT SKULL PIN (PLASTIC BASE): Brand: MAYFIELD®

## (undated) DEVICE — STANDARD SURGICAL GOWN, L: Brand: CONVERTORS

## (undated) DEVICE — ELECTRODE NEEDLE MOD E-Z CLEAN 2.75IN 7CM -0013M

## (undated) DEVICE — BASIC PACK: Brand: CONVERTORS

## (undated) DEVICE — SYRINGE 30ML LL

## (undated) DEVICE — GLOVE INDICATOR PI UNDERGLOVE SZ 8.5 BLUE

## (undated) DEVICE — NEEDLE 25G X 1 1/2

## (undated) DEVICE — PLUMEPEN PRO 10FT

## (undated) DEVICE — 40529 DERMAPROX PAD 11'' X 15'' X 1'': Brand: 40529 DERMAPROX PAD 11'' X 15'' X 1''

## (undated) DEVICE — INTENDED FOR TISSUE SEPARATION, AND OTHER PROCEDURES THAT REQUIRE A SHARP SURGICAL BLADE TO PUNCTURE OR CUT.: Brand: BARD-PARKER ® CARBON RIB-BACK BLADES

## (undated) DEVICE — GLOVE SRG BIOGEL 8

## (undated) DEVICE — SCD SEQUENTIAL COMPRESSION COMFORT SLEEVE MEDIUM KNEE LENGTH: Brand: KENDALL SCD

## (undated) DEVICE — SYRINGE 10ML LL

## (undated) DEVICE — INTENDED FOR TISSUE SEPARATION, AND OTHER PROCEDURES THAT REQUIRE A SHARP SURGICAL BLADE TO PUNCTURE OR CUT.: Brand: BARD-PARKER ® SAFETYLOCK CARBON RIB-BACK BLADES

## (undated) DEVICE — NEURO SURGICAL CLIPPER BLADE

## (undated) DEVICE — SUT PROLENE 3-0 V-7 36 IN 8976H

## (undated) DEVICE — SUT MONOCRYL 3-0 PS-2 27 IN Y427H

## (undated) DEVICE — ANTIBACTERIAL VIOLET BRAIDED (POLYGLACTIN 910), SYNTHETIC ABSORBABLE SUTURE: Brand: COATED VICRYL

## (undated) DEVICE — SPONGE 4 X 4 XRAY 16 PLY STRL LF RFD

## (undated) DEVICE — DRAPE SHEET X-LG

## (undated) DEVICE — 3M™ TEGADERM™ TRANSPARENT FILM DRESSING FRAME STYLE, 1626W, 4 IN X 4-3/4 IN (10 CM X 12 CM), 50/CT 4CT/CASE: Brand: 3M™ TEGADERM™

## (undated) DEVICE — SURGICEL 2 X 3

## (undated) DEVICE — LIGHT GLOVE GREEN

## (undated) DEVICE — TOOL 9MH30 LEGEND 9CM 3MM MH: Brand: MIDAS REX

## (undated) DEVICE — DRAPE EQUIPMENT RF WAND

## (undated) DEVICE — PROXIMATE PLUS MD MULTI-DIRECTIONAL RELEASE SKIN STAPLERS CONTAINS 35 STAINLESS STEEL STAPLES APPROXIMATE CLOSED DIMENSIONS: 6.9MM X 3.9MM WIDE: Brand: PROXIMATE

## (undated) DEVICE — DRESSING MEPILEX AG BORDER 4 X 8 IN

## (undated) DEVICE — SPONGE PVP SCRUB WING STERILE

## (undated) DEVICE — INTENDED FOR TISSUE SEPARATION, AND OTHER PROCEDURES THAT REQUIRE A SHARP SURGICAL BLADE TO PUNCTURE OR CUT.: Brand: BARD-PARKER SAFETY BLADES SIZE 15, STERILE

## (undated) DEVICE — FLOSEAL HEMOSTATIC MATRIX, 5 ML: Brand: FLOSEAL

## (undated) DEVICE — TIBURON SPLIT SHEET: Brand: CONVERTORS

## (undated) DEVICE — SNAP KOVER: Brand: UNBRANDED

## (undated) DEVICE — BETADINE OINTMENT FOIL PACK

## (undated) DEVICE — ELECTRODE BLADE E-Z CLEAN 4IN -0014A

## (undated) DEVICE — BIPOLAR SEALER 23-113-1 AQM 2.3: Brand: AQUAMANTYS™

## (undated) DEVICE — SPONGE STICK WITH PVP-I: Brand: KENDALL

## (undated) DEVICE — HEAD DONUT FOAM POSITIONER: Brand: CARDINAL HEALTH

## (undated) DEVICE — SKIN MARKER DUAL TIP WITH RULER CAP, FLEXIBLE RULER AND LABELS: Brand: DEVON